# Patient Record
Sex: FEMALE | Race: BLACK OR AFRICAN AMERICAN | HISPANIC OR LATINO | Employment: OTHER | URBAN - METROPOLITAN AREA
[De-identification: names, ages, dates, MRNs, and addresses within clinical notes are randomized per-mention and may not be internally consistent; named-entity substitution may affect disease eponyms.]

---

## 2017-02-03 ENCOUNTER — HOSPITAL ENCOUNTER (OUTPATIENT)
Dept: RADIOLOGY | Facility: HOSPITAL | Age: 67
Discharge: HOME | End: 2017-02-03
Payer: COMMERCIAL

## 2017-02-03 DIAGNOSIS — Z12.39 ENCOUNTER FOR OTHER SCREENING FOR MALIGNANT NEOPLASM OF BREAST: ICD-10-CM

## 2017-02-03 PROCEDURE — G0202 SCR MAMMO BI INCL CAD: HCPCS

## 2017-02-24 ENCOUNTER — GENERIC CONVERSION - ENCOUNTER (OUTPATIENT)
Dept: OTHER | Facility: OTHER | Age: 67
End: 2017-02-24

## 2017-03-06 ENCOUNTER — ALLSCRIPTS OFFICE VISIT (OUTPATIENT)
Dept: OTHER | Facility: OTHER | Age: 67
End: 2017-03-06

## 2017-10-06 ENCOUNTER — LAB CONVERSION - ENCOUNTER (OUTPATIENT)
Dept: OTHER | Facility: OTHER | Age: 67
End: 2017-10-06

## 2017-10-06 LAB
CHOLEST SERPL-MCNC: 196 MG/DL
CHOLEST/HDLC SERPL: 3.2 (CALC)
HDLC SERPL-MCNC: 62 MG/DL
LDL CHOLESTEROL (HISTORICAL): 117 MG/DL (CALC)
NON-HDL-CHOL (CHOL-HDL) (HISTORICAL): 134 MG/DL (CALC)
TRIGL SERPL-MCNC: 76 MG/DL

## 2017-10-27 ENCOUNTER — ALLSCRIPTS OFFICE VISIT (OUTPATIENT)
Dept: OTHER | Facility: OTHER | Age: 67
End: 2017-10-27

## 2017-10-28 NOTE — PROGRESS NOTES
Assessment  1  Benign essential hypertension (401 1) (I10)   2  Rash (782 1) (R21)   3  Vitamin D deficiency (268 9) (E55 9)    Plan  Benign essential hypertension    · Follow-up visit in 6 months Evaluation and Treatment  Follow-up  Status: Complete  Done: [address] Maintenance    · Drink plenty of fluids ; Status:Complete;   Done: 27Oct2017   · Eat a low fat and low cholesterol diet ; Status:Complete;   Done: 27Oct2017   · The plan of care for falls is detailed in the plan and/or discussion section of today's note ;  Status:Complete;   Done: 27Oct2017   · There are many exercise options for seniors ; Status:Complete;   Done: 27Oct2017   · There ways to avoid falling ; Status:Complete;   Done: 27Oct2017   · These are things you can do to prevent falls in and around the home ; Status:Complete;   Done:  27Oct2017   · We recommend that you create an advance directive ; Status:Complete;   Done: 27Oct2017   · We recommend that you follow these steps to lower your risk of osteoporosis  ; Status:Complete;    Done: 98MFA7619  Need for influenza vaccination    · Fluzone High-Dose 0 5 ML Intramuscular Suspension Prefilled Syringe  Rash    · 2 - Joseph SALMON, Naomi Goodman  (Dermatology) Co-Management  *  Status: Active  Requested for:  37DVW7936  Care Summary provided  : Yes  Screening for genitourinary condition    · *VB - Urinary Incontinence Screen (Dx Z13 89 Screen for UI); Status:Active; Requested  QGP:92LJH3683;   Screening for malignant neoplasm of breast, Screening for neurological condition    · *VB - Fall Risk Assessment  (Dx Z13 89 Screen for Neurologic Disorder); Status:Active; Requested  NSM:02FQO3585;   Vitamin D deficiency    · Vitamin D (Ergocalciferol) 55567 UNIT Oral Capsule; take one capsule twice weekly for 6  weeks    Discussion/Summary  Discussion Summary:   essential HTN - this has been controlled on present regimen of Norvasc 10mg daily  Encouraged continued weight loss  vitamin D deficiency - labs obtained by rheumatologist  Has underlying osteoporosis  Recommend starting loading dose of vitamin D2 [id number] twice weekly x 6 weeks then start vitamin D3 at least 2000units daily thereafter  rash - occurring in flexural areas  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  Advised continuing use of ketoconazole cream, keep areas dry as much as possible  Will also refer to Derm for further evaluation  R lumbar radiculopathy with OA s/p epidural injections - encouraged weight loss  Takes Tramadol for pain  Consider PTosteoporosis - fosamax restarted by Rheumatology  She had been d/c'ed off it due to dental disease previously  Monitor closely  Patient also noted to be on chronic prednisone  RA - continue on humira, plaquenil and prednisone per Rheumremote h/o sarcoidosis - monitor  Medication SE Review and Pt Understands Tx: Possible side effects of new medications were reviewed with the patient/guardian today  The treatment plan was reviewed with the patient/guardian  The patient/guardian understands and agrees with the treatment plan      Chief Complaint  Chief Complaint Chronic Condition [facility]: Patient is here today for follow up of chronic conditions described in HPI  History of Present Illness  HPI: 69yo female with HTN, RA, sarcoidosis, abnormal thyroid function test and hyperparathyroidism here for follow up care  She presents with her daughter  rash comes and goes  Located in flexural areas  It is pruritic  She placed ketoconazole cream, sometimes it goes away  R groin, unable to elevate leg, resolved with Tramadol  Has occasional low back pain that radiates to her R groin   had vitamin D level checked by Rheum, reports level was 16  She was advised to start vitamin D3 2000units daily  Hypertension (Follow-Up): The patient presents for follow-up of essential hypertension  The patient states she has been stable with her blood pressure control since the last visit  She has no significant interval events  Symptoms: improved lower extremity edema  Home monitoring: The patient is not checking blood pressure at home  Medications: the patient is adherent with her medication regimen  -- She denies medication side effects  Medication(s): a calcium channel blocker  Review of Systems  Complete-Female:   Constitutional: recent weight loss, but-- No fever, no chills, feels well, no tiredness, no recent weight gain or weight loss  ENT: as noted in HPI  Cardiovascular: No complaints of slow heart rate, no fast heart rate, no chest pain, no palpitations, no leg claudication, no lower extremity edema  Respiratory: No complaints of shortness of breath, no wheezing, no cough, no SOB on exertion, no orthopnea, no PND  Gastrointestinal: No complaints of abdominal pain, no constipation, no nausea or vomiting, no diarrhea, no bloody stools  Genitourinary: No complaints of dysuria, no incontinence, no pelvic pain, no dysmenorrhea, no vaginal discharge or bleeding  Integumentary: as noted in HPI  Neurological: as noted in HPI  Active Problems  1  Allergic rhinitis (477 9) (J30 9)   2  Benign essential hypertension (401 1) (I10)   3  Bilateral hearing loss (389 9) (H91 93)   4  Candidal intertrigo (112 3) (B37 2)   5  Encounter for screening colonoscopy (V76 51) (Z12 11)   6  Gastroesophageal reflux disease (530 81) (K21 9)   7  Hyperparathyroidism (252 00) (E21 3)   8  Lumbar radiculopathy (724 4) (M54 16)   9  Morbid obesity (278 01) (E66 01)   10  Need for influenza vaccination (V04 81) (Z23)   11  Osteoporosis (733 00) (M81 0)   12  Rheumatoid arthritis (714 0) (M06 9)   13  Sarcoidosis (135) (D86 9)   14  Screening for genitourinary condition (V81 6) (Z13 89)   15  Screening for malignant neoplasm of breast (V76 10) (Z12 31)   16  Screening for neurological condition (V80 09) (Z13 89)   17  Vitamin D deficiency (268 9) (E55 9)    Past Medical History  1   History of Abnormal thyroid function test (794 5) (R94 6)   2  History of Acute URI (465 9) (J06 9)   3  History of Edema of right lower extremity (782 3) (R60 0)   4  History of caries (V13 89) (Z87 898)   5  History of dental abscess (V12 79) (Z87 19)   6  History of influenza vaccination (V49 89) (Z92 29)   7  History of Known health problems: none   8  History of Need for influenza vaccination (V04 81) (Z23)  Active Problems And Past Medical History Reviewed: The active problems and past medical history were reviewed and updated today  Surgical History  1  History of  Section  Surgical History Reviewed: The surgical history was reviewed and updated today  Family History  Mother    1  Family history of Asthma   2  Denied: Family history of colitis   3  Denied: Family history of colonic polyps   4  Denied: Family history of Crohn's disease   5  Denied: Family history of liver disease   6  Denied: Family history of Malignant neoplasm of colon, unspecified part of colon   7  Family history of Stroke  Father    8  Denied: Family history of colitis   9  Denied: Family history of colonic polyps   10  Denied: Family history of Crohn's disease   6  Denied: Family history of liver disease   12  Denied: Family history of Malignant neoplasm of colon, unspecified part of colon  Maternal Aunt    15  Family history of Diabetes  Maternal Cousin    15  Family history of Breast cancer   15  Family history of Diabetes  Maternal Relatives    12  Family history of Breast cancer  Family History Reviewed: The family history was reviewed and updated today  Social History   ·    · Never a smoker   · No alcohol use   · Occupation   · Retired  Social History Reviewed: The social history was reviewed and is unchanged  Current Meds   1  Acetaminophen 325 MG Oral Tablet; Therapy: (Recorded:22Woi6418) to Recorded   2  Fosamax 70 MG Oral Tablet; Therapy: (Recorded:12Ltj1981) to Recorded   3   Humira Pen 40 MG/0 8ML KIT; INJECT 40 MG SUBCUTANEOUSLY EVERY OTHER WEEK AS   DIRECTED; Therapy: 15LFX9931 to Recorded   4  Hydroxychloroquine Sulfate 200 MG Oral Tablet; TAKE 2 TABLETS DAILY WITH FOOD; Therapy: 78SMC8232 to Recorded   5  Ketoconazole 2 % External Cream; APPLY SPARINGLY TO AFFECTED AREA(S) TWICE A DAY; Therapy: 70OJQ1881 to (Evaluate:51Nsa3969)  Requested for: 43Exf2609; Last Rx:82Nmv2598   Ordered   6  MoviPrep 100 GM Oral Solution Reconstituted; USE AS DIRECTED; Therapy: 41OPM1685 to (Last Rx:70Jfr3282)  Requested for: 83Zpt3714 Ordered   7  Norvasc 10 MG Oral Tablet; take 1 tablet by mouth once daily; Therapy: 87CIW6200 to (Evaluate:67Grp2748)  Requested for: 82Omc1893; Last Rx:66Ibu6166   Ordered   8  Omeprazole 20 MG Oral Capsule Delayed Release; take 1 capsule daily; Therapy: 50GVL7343 to (Ashley Melara)  Requested for: 87MLT6422; Last Rx:74Qfj2353   Ordered   9  PredniSONE 1 MG Oral Tablet; Take 2 tablets in pm;   Therapy: 06MCU7648 to Recorded   10  TraMADol HCl - 50 MG Oral Tablet; TAKE 1 TABLET 3 times a day; Therapy: 49CXP7774 to (Evaluate:83Rfe3950) Recorded  Medication List Reviewed: The medication list was reviewed and updated today  Allergies  1  Unasyn  2  Shellfish    Vitals  Vital Signs    Recorded: 23ZSM9067 10:24AM   Temperature 97 2 F   Heart Rate 82   Respiration 16   Systolic 090   Diastolic 78   Height 5 ft 3 in   Weight 252 lb    BMI Calculated 44 64   BSA Calculated 2 13   O2 Saturation 99     Physical Exam    Constitutional   General appearance: No acute distress, well appearing and well nourished  morbidly obese  Head and Face   Head and face: Normal  -- wears glasses  Eyes   Conjunctiva and lids: No swelling, erythema or discharge  Ears, Nose, Mouth, and Throat   External inspection of ears and nose: Normal     Otoscopic examination: Tympanic membranes translucent with normal light reflex  Canals patent without erythema      Hearing: Normal     Nasal mucosa, septum, and turbinates: Normal without edema or erythema  Lips, teeth, and gums: Normal, good dentition  Oropharynx: Normal with no erythema, edema, exudate or lesions  Neck   Neck: Supple, symmetric, trachea midline, no masses  Thyroid: Normal, no thyromegaly  Pulmonary   Respiratory effort: No increased work of breathing or signs of respiratory distress  Auscultation of lungs: Clear to auscultation  Cardiovascular   Auscultation of heart: Normal rate and rhythm, normal S1 and S2, no murmurs  Carotid pulses: 2+ bilaterally  Pedal pulses: 2+ bilaterally  Examination of extremities for edema and/or varicosities: Normal     Chest deferred  Abdomen   Abdomen: Non-tender, no masses  The abdomen was obese  Bowel sounds were normal  The abdomen was soft and nontender  The abdomen was normal to percussion  Lymphatic   Palpation of lymph nodes in neck: No lymphadenopathy  Musculoskeletal ambulates with a cane  Skin   Examination of the skin for lesions: Abnormal  -- (mild erythema around her neck, bilateral antecubital fossa, under breasts b/l with minimal exfoliation)   Neurologic Grossly intact  Cortical function: Normal mental status  There is no cognitive impairment  The patient achieved a score of 30 / 30 on the MMSE  Level of consciousness: normal    Psychiatric   Orientation to person, place, and time: Normal     Mood and affect: Normal        Results/Data  (Q) LIPID PANEL WITH REFLEX TO DIRECT LDL 57OIS6661 10:42AM Marylen Crofts   REPORT COMMENT:  FASTING:YES  PATIENT UNABLE TO VOID; ADVISED TO RETURN FOR COLLECTION  Test Name Result Flag Reference   CHOLESTEROL, TOTAL 196 mg/dL  <200   HDL CHOLESTEROL 62 mg/dL  >24   TRIGLICERIDES 76 mg/dL  <717   LDL-CHOLESTEROL 117 mg/dL (calc) H    Reference range: <100     Desirable range <100 mg/dL for patients with CHD or  diabetes and <70 mg/dL for diabetic patients with  known heart disease       LDL-C is now calculated using the Anibal-Cabral   calculation, which is a validated novel method providing   better accuracy than the Friedewald equation in the   estimation of LDL-C  Kita Olmedo  Ivett Changon  77;618(45): 9911-0841   (http://[employer]/faq/FMJ419)   CHOL/HDLC RATIO 3 2 (calc)  <5 0   NON HDL CHOLESTEROL 134 mg/dL (calc) H <130   For patients with diabetes plus 1 major ASCVD risk   factor, treating to a non-HDL-C goal of <100 mg/dL   (LDL-C of <70 mg/dL) is considered a therapeutic   option       Future Appointments    Date/Time Provider Specialty Site   04/30/2018 10:45 AM Juan Aguilera DO Internal Medicine [facility] INTERNAL MED     Signatures   Electronically signed by : Diann Guzman DO; Oct 27 2017 12:47PM EST                       (Author)

## 2018-01-13 VITALS
WEIGHT: 252 LBS | OXYGEN SATURATION: 99 % | DIASTOLIC BLOOD PRESSURE: 78 MMHG | SYSTOLIC BLOOD PRESSURE: 128 MMHG | BODY MASS INDEX: 44.65 KG/M2 | HEART RATE: 82 BPM | RESPIRATION RATE: 16 BRPM | TEMPERATURE: 97.2 F | HEIGHT: 63 IN

## 2018-01-14 VITALS
RESPIRATION RATE: 16 BRPM | HEIGHT: 63 IN | BODY MASS INDEX: 47.33 KG/M2 | WEIGHT: 267.13 LBS | SYSTOLIC BLOOD PRESSURE: 130 MMHG | HEART RATE: 85 BPM | TEMPERATURE: 97 F | OXYGEN SATURATION: 98 % | DIASTOLIC BLOOD PRESSURE: 80 MMHG

## 2018-01-14 NOTE — MISCELLANEOUS
Assessment    1  Dental abscess (522 5) (K04 7)   2  Dental caries (521 00) (K02 9)    Discussion/Summary  Discussion Summary:     1  dental caries with dental abscess s/p extraction of teeth 2, 18, 19, 30, 31 and I&D - expressed importance of completing course of Clindamycin, to prevent resistance and resolution of infection  She is to follow up with OMFS on Monday  Continue to hold off on fosamax until ok by OMFS  Resume Humira in 1-2 weeks  Counseling Documentation With Imm: The patient, patient's family was counseled regarding instructions for management, risks and benefits of treatment options, importance of compliance with treatment  History of Present Illness  TCM Communication [facility]: The patient is being contacted for follow-up after hospitalization and [place]  She was hospitalized at [facility]  The date of admission: 8/23/2016, date of discharge: 8/29/2016  Diagnosis: Fever and Chills  She was discharged to home  She scheduled a follow up appointment  Symptoms: fatigue  The patient is currently experiencing symptoms  Counseling was provided to the patient  Communication performed and completed by Charles Guadarrama   HPI: 65yo female with h/o HTN, RA, sarcoidosis, abnormal thyroid function test and hyperparathyroidism here transition of care  She is accompanied by her daughter, Travis Ling  She was hospitalized at [facility] 8/23-8/29/16 with f/c and left sided mouth pain  She was found to have multiple dental caries and left facial abscess  She had multiple teeth extraction by OMFS  She had BC 1/2 return positive but was thought to be a contaminant by ID  She had a rash with Unasyn and subsequently discharged on Clindamycin  Patient admits that she has not completed her course of abx, has five pills left  She was told to withhold fosamax for 2-4 weeks  She has an upcoming follow up with OMFS on Monday  She is on a soft diet  Denies jaw pain, nausea, diarrhea, fever   She took one dose of oxycodone but did not like how it made her feel and has not taken it since  Review of Systems  Complete-Female:   Constitutional: No fever, no chills, feels well, no tiredness, no recent weight gain or weight loss  ENT: as noted in HPI  Cardiovascular: No complaints of slow heart rate, no fast heart rate, no chest pain, no palpitations, no leg claudication, no lower extremity edema  Respiratory: No complaints of shortness of breath, no wheezing, no cough, no SOB on exertion, no orthopnea, no PND  Neurological: as noted in HPI  Active Problems    1  Allergic rhinitis (477 9) (J30 9)   2  Benign essential hypertension (401 1) (I10)   3  Bilateral hearing loss (389 9) (H91 93)   4  Candidal intertrigo (112 3) (B37 2)   5  Cough (786 2) (R05)   6  Edema of right lower extremity (782 3) (R60 0)   7  Encounter for screening colonoscopy (V76 51) (Z12 11)   8  Gastroesophageal reflux disease (530 81) (K21 9)   9  Hyperparathyroidism (252 00) (E21 3)   10  Immunization, pneumococcus and influenza (V06 6) (Z23)   11  Lumbar radiculopathy (724 4) (M54 16)   12  Morbid obesity (278 01) (E66 01)   13  Need for influenza vaccination (V04 81) (Z23)   14  Need for pneumococcal vaccination (V03 82) (Z23)   15  Rheumatoid arthritis (714 0) (M06 9)   16  Sarcoidosis (135) (D86 9)   17  Screening for depression (V79 0) (Z13 89)   18  Screening for genitourinary condition (V81 6) (Z13 89)   19  Screening for malignant neoplasm of breast (V76 10) (Z12 39)   20  Screening for neurological condition (V80 09) (Z13 89)   21  Vitamin D deficiency (268 9) (E55 9)    Past Medical History    1  History of Abnormal thyroid function test (794 5) (R94 6)   2  History of Acute URI (465 9) (J06 9)   3  History of Known health problems: none    Surgical History    1  History of  Section  Surgical History Reviewed: The surgical history was reviewed and updated today  Family History  Mother    1   Family history of Asthma   2  Denied: Family history of colitis   3  Denied: Family history of colonic polyps   4  Denied: Family history of Crohn's disease   5  Denied: Family history of liver disease   6  Denied: Family history of Malignant neoplasm of colon, unspecified part of colon   7  Family history of Stroke  Father    8  Denied: Family history of colitis   9  Denied: Family history of colonic polyps   10  Denied: Family history of Crohn's disease   6  Denied: Family history of liver disease   12  Denied: Family history of Malignant neoplasm of colon, unspecified part of colon  Maternal Aunt    15  Family history of Diabetes  Maternal Cousin    15  Family history of Breast cancer   15  Family history of Diabetes  Maternal Relatives    12  Family history of Breast cancer  Family History Reviewed: The family history was reviewed and updated today  Social History    ·    · Never a smoker   · No alcohol use   · Occupation   · Retired  Social History Reviewed: The social history was reviewed and updated today  Current Meds   1  Acetaminophen 325 MG Oral Tablet; Therapy: (Recorded:53Pca0887) to Recorded   2  Clindamycin HCl - 300 MG Oral Capsule; Therapy: (Recorded:44Nbp7576) to Recorded   3  Humira Pen 40 MG/0 8ML KIT; INJECT 40 MG SUBCUTANEOUSLY EVERY OTHER   WEEK AS DIRECTED; Therapy: 53ZLQ1325 to Recorded   4  Hydroxychloroquine Sulfate 200 MG Oral Tablet; TAKE 2 TABLETS DAILY WITH FOOD; Therapy: 58SME8572 to Recorded   5  Ketoconazole 2 % External Cream; APPLY SPARINGLY TO AFFECTED AREA(S) TWICE   DAILY; Therapy: 32QTI8654 to (Last Rx:60Hrb1381)  Requested for: 52QNV1739 Ordered   6  MoviPrep 100 GM Oral Solution Reconstituted; USE AS DIRECTED; Therapy: 49MAS9491 to (Last Rx:10Uvj9473)  Requested for: 75Dxv7347 Ordered   7  Norvasc 10 MG Oral Tablet; TAKE 1 TABLET DAILY; Therapy: 91ZPR0882 to (Evaluate:40Zjv8996)  Requested for: 54PVM8254; Last   AP:23TBS4068 Ordered   8  PredniSONE 1 MG Oral Tablet; Take 2 tablets in pm;   Therapy: 57DJL6299 to Recorded   9  PredniSONE 5 MG Oral Tablet; TAKE 1 TABLET DAILY; Therapy: 90GJS4439 to (21 [phone]) Recorded   10  TraMADol HCl - 50 MG Oral Tablet; TAKE 1 TABLET 3 times a day; Therapy: 56JEJ3658 to (Evaluate:16Ivx3005) Recorded  Medication List Reviewed: The medication list was reviewed and updated today  Allergies    1  clindamycin    2  Shellfish    Physical Exam    Constitutional   General appearance: No acute distress, well appearing and well nourished  obese  Ears, Nose, Mouth, and Throat   External inspection of ears and nose: Normal     Otoscopic examination: Tympanic membranes translucent with normal light reflex  Canals patent without erythema  Nasal mucosa, septum, and turbinates: Normal without edema or erythema  Oropharynx: Abnormal   (sutures on lower madible)   Pulmonary   Respiratory effort: No increased work of breathing or signs of respiratory distress  Auscultation of lungs: Clear to auscultation  Cardiovascular   Auscultation of heart: Normal rate and rhythm, normal S1 and S2, without murmurs  Lymphatic   Palpation of lymph nodes in neck: No lymphadenopathy           Future Appointments    Date/Time Provider Specialty Site   10/12/2016 09:45 AM Alexander Meza DO Internal Medicine [facility] INTERNAL MED     Signatures   Electronically signed by : Charles Guadarrama OM; Aug 30 2016  3:52PM EST                       (Author)    Electronically signed by : Char Dickson DO; Sep  9 2016  2:49PM EST                       (Author)

## 2018-01-16 NOTE — PROGRESS NOTES
Assessment    1  Encounter for preventive health examination (V70 0) (Z00 00)    Plan  Benign essential hypertension    · (Q) LIPID PANEL WITH DIRECT LDL; Status:Active; Requested for:01Apr2016;    · (Q) TSH, 3RD GENERATION W/REFLEX TO FT4; Status:Active; Requested  for:01Apr2016;    · (Q) URINALYSIS, SCREEN; Status:Active; Requested for:01Apr2016;   Encounter for screening colonoscopy    · *2 - [facility] Physician Referral  Consult   Status: Active  Requested for: 01Apr2016  Care Summary provided  : Yes  Health Maintenance    · Drink plenty of fluids ; Status:Complete;   Done: 01Apr2016   · Eat a low fat and low cholesterol diet ; Status:Complete;   Done: 01Apr2016   · The plan of care for falls is detailed in the plan and/or discussion section of today's note ;  Status:Complete;   Done: 01Apr2016   · There are many exercise options for seniors ; Status:Complete;   Done: 01Apr2016   · These are things you can do to prevent falls in and around the home ; Status:Complete;    Done: 01Apr2016   · We encourage you to begin to make lifestyle changes to help control your blood  pressure  These may include losing weight, increasing your activity level, limiting salt in  your diet, decreasing alcohol intake, and eating a diet low in fat and rich in fruits  and vegetables ; Status:Complete;   Done: 01Apr2016   · We recommend that you create an advance directive ; Status:Complete;   Done:  01Apr2016   · We recommend that you follow these steps to lower your risk of osteoporosis  ;  Status:Complete;   Done: [id number]  Health Maintenance, Encounter for screening colonoscopy    · COLONOSCOPY; Status:Active; Requested for:01Apr2016;   Screening for genitourinary condition    · *VB-Urinary Incontinence Screen (Dx V81 6 Screen for UI); Status:Active;  Requested  for:01Apr2016;   Screening for neurological condition    · *VB - Fall Risk Assessment  (Dx V80 09 Screen for Neurologic Disorder); Status:Active; Requested for:01Apr2016;     Discussion/Summary    Depression screen - negative     screen - negative    Fall screen - negative  Impression: Initial Annual Wellness Visit, with preventive exam as well as age and risk appropriate counseling completed  Cardiovascular screening and counseling: the risks and benefits of screening were discussed and screening is current  Diabetes screening and counseling: the risks and benefits of screening were discussed and screening is current  Colorectal cancer screening and counseling: the risks and benefits of screening were discussed, due for a colonoscopy (low risk) and last colonoscopy in 2000  Breast cancer screening and counseling: the risks and benefits of screening were discussed and screening is current  Cervical cancer screening and counseling: the risks and benefits of screening were discussed and the patient declines screening  Osteoporosis screening and counseling: the risks and benefits of screening were discussed, screening is current, counseling was given on obtaining adequate amounts of calcium and vitamin D on a daily basis, counseling was given on the importance of regular weightbearing exercise and done by Rheum Dr Sonia Spain    Abdominal aortic aneurysm screening and counseling: screening not indicated  Glaucoma screening and counseling: the risks and benefits of screening were discussed, screening is current and ophthalmologist referral    HIV screening and counseling: screening not indicated   Immunizations: influenza vaccine is up to date this year, the lifetime pneumococcal vaccine has been completed, hepatitis B vaccination series is not indicated at this time due to the patient's low risk of peyton the disease, unable to have shingles to due medications, the risks and benefits of the Td vaccine were discussed with the patient, the patient declines the Td vaccine, the risks and benefits of the Tdap vaccine were discussed with the patient and the patient declines the Tdap vaccine  Advance Directive Planning: not complete, she was encouraged to follow-up with me to discuss her questions and/or decisions  Advice and education were given regarding fall risk reduction, increasing physical activity and nutrition (non-diabetic)  She was referred to none today  Medical Equipment/Suppliers: none today  Patient Discussion: plan discussed with the patient, follow-up visit needed in 6 months, 40 minute visit, greater than half of the time was spent on counseling  History of Present Illness  HPI: 68yo female with h/o HTN, RA, sarcoidosis, abnormal thyroid function test and hyperparathyroidism here medicare wellness visit   Welcome to [employer] and Wellness Visits: The patient is being seen for the initial annual wellness visit  Medicare Screening and Risk Factors   Hospitalizations: no previous hospitalizations, she has been hospitalized 0 times and No hospitalizations over past 12 months  Once per lifetime medicare screening tests: not eligible  Medicare Screening Tests Risk Questions   Abdominal aortic aneurysm risk assessment: none indicated  Osteoporosis risk assessment:  and female gender  HIV risk assessment: none indicated  Drug and Alcohol Use: The patient has never smoked cigarettes  The patient reports never drinking alcohol  Diet and Physical Activity: Current diet includes low fat food choices and low salt food choices  She is sedentary  (regular diet)   Mood Disorder and Cognitive Impairment Screening: Geriatric Depression Scale   Yes, the patient is satisfied with Her life  No, the patient has not dropped any activities or interests  No, the patient does not feel that their life is empty  No, the patient does not often get bored  Yes, the patient is hopeful about the future  No, the patient is not bothered by thoughts in their head     Yes, the patient is in good spirits most of the time    Yes, the patient is afraid something bad is going to happen to them  Point = 1  Yes, the patient feels happy most of the time  No, the patient does not often feel helpless  No, the patient mortensen not often get restless and fidgety  Yes, the patient prefers to stay home rather then try new things  Point = 1  Yes, the patient often worries about the future  Point = 1  No, the patient does not feel that they have more problems with their memory than most    Yes, the patient thinks its wonderful to be alive now  No, the patient does not feel downhearted or blue  No, the patient does not feel worthless the way they are currently  Yes, the patient finds that life is very exciting  No, the patient does not worry a lot about the past    No, it is not hard for the patient to get started on new projects  Yes, the patient feels full of energy  No, the patient does not feel their situation is hopeless  No, the patient does not feel that most people are better off then they are  No, the patient does not frequently get upset about the little things  No, the patient does not frequently feel like crying  No, the patient does not have any problems concentrating  Yes, the patient enjoys getting up in the morning  No, the patient enjoys social gatherings  Yes, the patient finds it easy to make decisions  Yes, the patient feels their mind is as clear as it used to be  Score 3  Normal 0 - 9, Mild Depression 10 - 19, Severe Depression 20 - 30   Functional Ability/Level of Safety: Hearing is slightly decreased  The patient is currently unable to drive, but able to do activities of daily living without limitations, able to do instrumental activities of daily living without limitations and able to participate in social activities without limitations   Activities of daily living details: transportation help needed, but does not need help using the phone, does not need help shopping, no meal preparation help needed, does not need help doing housework, does not need help doing laundry, does not need help managing medications and does not need help managing money  Fall risk factors: The patient fell 0 times in the past 12 months  Home safety risk factors:  no grab bars in the bathroom and Lives with her daughter and two granddaughters in a multistory home, but no loose rugs, no poor household lighting, no household clutter and handrails on the stairs  Advance Directives: Advance directives: no living will, no durable power of  for health care directives and no advance directives  Co-Managers and Medical Equipment/Suppliers: See Patient Care Team   Preventive Quality Program 65 and Older: Falls Risk: The patient fell 0 times in the past 12 months  The patient currently has no urinary incontinence symptoms  Patient Care Team    Care Team Member Role Specialty Office Number   Haydee Weir MD  Rheumatology [phone]   Lyndsey Li MD  Ophthalmology [phone]     Review of Systems    Constitutional: negative    The patient presents with complaints of eyesight problems (cataracts)  ENT: nasal discharge  Cardiovascular: negative  Respiratory: negative  Gastrointestinal: heartburn, but no abdominal pain, no nausea and no constipation  Genitourinary: negative  Musculoskeletal: diffuse joint pain and joint stiffness  Neurological: headache, but no dizziness  Active Problems    1  Allergic rhinitis (477 9) (J30 9)   2  Benign essential hypertension (401 1) (I10)   3  Bilateral hearing loss (389 9) (H91 93)   4  Candidal intertrigo (112 3) (B37 2)   5  Cough (786 2) (R05)   6  Edema of right lower extremity (782 3) (R60 0)   7  Encounter for screening colonoscopy (V76 51) (Z12 11)   8  GERD (gastroesophageal reflux disease) (530 81) (K21 9)   9  Hyperparathyroidism (252 00) (E21 3)   10  Immunization, pneumococcus and influenza (V06 6) (Z23)   11   Lumbar radiculopathy (724 4) (M54 16)   12  Need for influenza vaccination (V04 81) (Z23)   13  Need for pneumococcal vaccination (V03 82) (Z23)   14  Rheumatoid arthritis (714 0) (M06 9)   15  Sarcoidosis (135) (D86 9)   16  Screening for malignant neoplasm of breast (V76 10) (Z12 39)   17  Vitamin D deficiency (268 9) (E55 9)    Past Medical History    · History of Abnormal thyroid function test (794 5) (R94 6)   · History of Acute URI (465 9) (J06 9)   · History of Known health problems: none    The active problems and past medical history were reviewed and updated today  Surgical History    · History of  Section    The surgical history was reviewed and updated today  Family History    · Family history of Asthma   · Family history of Stroke    · No pertinent family history    · Family history of Diabetes    · Family history of Breast cancer   · Family history of Diabetes    · Family history of Breast cancer    The family history was reviewed and updated today  Social History    ·    · Never a smoker   · No alcohol use   · Occupation   · worked for [employer]   · Retired  The social history was reviewed and is unchanged  Current Meds   1  Humira Pen 40 MG/0 8ML KIT; INJECT 40 MG SUBCUTANEOUSLY EVERY OTHER   WEEK AS DIRECTED; Therapy: 57KZB1889 to Recorded   2  Hydroxychloroquine Sulfate 200 MG Oral Tablet; TAKE 2 TABLETS DAILY WITH FOOD; Therapy: 60BTL5843 to Recorded   3  Ketoconazole 2 % External Cream; APPLY SPARINGLY TO AFFECTED AREA(S) TWICE   DAILY; Therapy: 78VHX2336 to (Last Rx:[id number])  Requested for: 96BUK6790 Ordered   4  Norvasc 10 MG Oral Tablet; TAKE 1 TABLET DAILY; Therapy: 70EFE7766 to (July Dec)  Requested for: 52GGQ5645; Last   Rx:[id number] Ordered   5  Omeprazole 20 MG Oral Capsule Delayed Release; TAKE 1 CAPSULE DAILY; Therapy: 25YAS1553 to (Evaluate:2016)  Requested for: 53Asa6981; Last   Rx:[id number] Ordered   6   PredniSONE 1 MG Oral Tablet; Take 2 tablets in pm;   Therapy: 02HUJ4878 to Recorded   7  PredniSONE 5 MG Oral Tablet; TAKE 1 TABLET DAILY; Therapy: 97BOI4287 to (4951 35 06 90) Recorded   8  TraMADol HCl - 50 MG Oral Tablet; TAKE 1 TABLET 3 times a day; Therapy: 56RQM8274 to (Evaluate:21Iwi7125) Recorded    The medication list was reviewed and updated today  Allergies    1  clindamycin    Immunizations   1 2    Influenza  37BNM0006 07JSG2991    Pneumococcal  06PXT3057 [id number]     Vitals  Signs [Data Includes: Current Encounter]    Temperature: 98 9 F  Heart Rate: 100  Respiration: 17  Systolic: 131  Diastolic: 86  Height: 5 ft 3 in  Weight: 264 lb 2 08 oz  BMI Calculated: 46 79  BSA Calculated: 2 18  O2 Saturation: 98    Physical Exam    Constitutional   General appearance: No acute distress, well appearing and well nourished  morbidly obese  Head and Face   Head and face: Normal   wears glasses  Eyes   Conjunctiva and lids: No swelling, erythema or discharge  Ears, Nose, Mouth, and Throat   External inspection of ears and nose: Normal     Otoscopic examination: Tympanic membranes translucent with normal light reflex  Canals patent without erythema  Hearing: Normal     Nasal mucosa, septum, and turbinates: Normal without edema or erythema  Lips, teeth, and gums: Normal, good dentition  Oropharynx: Normal with no erythema, edema, exudate or lesions  Neck   Neck: Supple, symmetric, trachea midline, no masses  Thyroid: Normal, no thyromegaly  Pulmonary   Respiratory effort: No increased work of breathing or signs of respiratory distress  Auscultation of lungs: Clear to auscultation  Cardiovascular   Auscultation of heart: Normal rate and rhythm, normal S1 and S2, no murmurs  Carotid pulses: 2+ bilaterally  Pedal pulses: 2+ bilaterally  Examination of extremities for edema and/or varicosities: Abnormal   right pretibial 1+ pitting edema     Chest   Breasts: Normal, no dimpling or skin changes appreciated  Palpation of breasts and axillae: Normal, no masses palpated  Chest: Normal     Abdomen   Abdomen: Non-tender, no masses  The abdomen was obese  Bowel sounds were normal  The abdomen was soft and nontender  The abdomen was normal to percussion  Lymphatic   Palpation of lymph nodes in neck: No lymphadenopathy  Musculoskeletal ambulates with a cane  Neurologic No focal deficit  Cortical function: Normal mental status  There is no cognitive impairment  The patient achieved a score of 28 / 30 on the MMSE  Level of consciousness: normal    Psychiatric   Orientation to person, place, and time: Normal     Mood and affect: Normal        Procedure    Procedure: Visual Acuity Test    Results: 20/25 in both eyes without corrective device, 20/40 in the right eye without corrective device, 20/50 in the left eye without corrective device, 20/40 in both eyes with corrective device, 20/30 in the right eye with corrective device, 20/25 in the left eye with corrective device The patient was referred to Opthomology  Signatures   Electronically signed by : Arlen Obregon DO;  Apr 1 2016 10:38AM EST                       (Author)

## 2018-01-18 NOTE — PROGRESS NOTES
Assessment    1  Encounter for preventive health examination (V70 0) (Z00 00)    Plan  Benign essential hypertension    · Follow-up visit in 6 months Evaluation and Treatment  Follow-up  Status: Complete   Done: [address] Maintenance    · Drink plenty of fluids ; Status:Complete;   Done: 27Oct2017   · Eat a low fat and low cholesterol diet ; Status:Complete;   Done: 27Oct2017   · The plan of care for falls is detailed in the plan and/or discussion section of today's note ;  Status:Complete;   Done: 27Oct2017   · There are many exercise options for seniors ; Status:Complete;   Done: 27Oct2017   · There ways to avoid falling ; Status:Complete;   Done: 27Oct2017   · These are things you can do to prevent falls in and around the home ; Status:Complete;    Done: 27Oct2017   · We recommend that you create an advance directive ; Status:Complete;   Done:  27Oct2017   · We recommend that you follow these steps to lower your risk of osteoporosis  ;  Status:Complete;   Done: [id number]  Need for influenza vaccination    · Fluzone High-Dose 0 5 ML Intramuscular Suspension Prefilled Syringe  Rash    · 2 - Joseph SALMON, Lenette Press  (Dermatology) Co-Management  *  Status: Active  Requested  for: 10VHL5948  Care Summary provided  : Yes  Screening for genitourinary condition    · *VB - Urinary Incontinence Screen (Dx Z13 89 Screen for UI); Status:Active; Requested  SIZ:[id number];   Screening for malignant neoplasm of breast, Screening for neurological condition    · *VB - Fall Risk Assessment  (Dx Z13 89 Screen for Neurologic Disorder); Status:Active; Requested KHUSHBOO:[id number];   Vitamin D deficiency    · Vitamin D (Ergocalciferol) 84860 UNIT Oral Capsule; take one capsule twice  weekly for 6 weeks    Discussion/Summary    Depression screen - negative    Fall screen - negative     screen - negative  Impression: Subsequent Annual Wellness Visit, with preventive exam as well as age and risk appropriate counseling completed  Cardiovascular screening and counseling: the risks and benefits of screening were discussed and screening is current  Diabetes screening and counseling: the risks and benefits of screening were discussed and screening is current  Colorectal cancer screening and counseling: the risks and benefits of screening were discussed and due for a colonoscopy (low risk)  Breast cancer screening and counseling: the risks and benefits of screening were discussed and screening is current  Cervical cancer screening and counseling: the risks and benefits of screening were discussed, the patient declines screening and due to advanced age  Osteoporosis screening and counseling: screening is current and followed by Dr Hunter Regalado    Abdominal aortic aneurysm screening and counseling: screening not indicated  Glaucoma screening and counseling: the risks and benefits of screening were discussed and ophthalmologist referral    HIV screening and counseling: screening not indicated  Immunizations: the risks and benefits of influenza vaccination were discussed with the patient, influenza vaccine is due today, the lifetime pneumococcal vaccine has been completed, hepatitis B vaccination series is not indicated at this time due to the patient's low risk of peyton the disease, unable to have shingles to due medications, the risks and benefits of the Td vaccine were discussed with the patient, the patient declines the Td vaccine, the risks and benefits of the Tdap vaccine were discussed with the patient and the patient declines the Tdap vaccine  Advance Directive Planning: not complete, she was encouraged to follow-up with me to discuss her questions and/or decisions  Advice and education were given regarding fall risk reduction, increasing physical activity and nutrition (non-diabetic)  She was referred to none today  Medical Equipment/Suppliers: none today   Patient Discussion: plan discussed with the patient, plan discussed with the patient's family, follow-up visit needed in 6 months, 18 minute visit, greater than half of the time was spent on counseling  History of Present Illness  69yo female with HTN, RA, sarcoidosis, abnormal thyroid function test and hyperparathyroidism here for transition of care  She is accompanied by her daughter, Magdy York   The patient is being seen for the subsequent annual wellness visit  Medicare Screening and Risk Factors   Hospitalizations: she has been previously hospitalizied, she has been hospitalized 1 times and hospitalized 8/2016 due to dental caries with abscess  Once per lifetime medicare screening tests: not eligible  Medicare Screening Tests Risk Questions   Abdominal aortic aneurysm risk assessment: none indicated  Osteoporosis risk assessment:  and female gender  HIV risk assessment: none indicated  Drug and Alcohol Use: The patient has never smoked cigarettes  The patient reports never drinking alcohol  Diet and Physical Activity: She is sedentary  (regular diet)   Mood Disorder and Cognitive Impairment Screening: She denies feeling down, depressed, or hopeless over the past two weeks  She denies feeling little interest or pleasure in doing things over the past two weeks  Functional Ability/Level of Safety: Hearing is slightly decreased  The patient is currently unable to drive, but able to do activities of daily living without limitations, able to do instrumental activities of daily living without limitations and able to participate in social activities without limitations  Fall risk factors: The patient fell 0 times in the past 12 months  Home safety risk factors:  no grab bars in the bathroom and Lives with her daughter an dtwo granddaugthers in a multistory home  Advance Directives: Advance directives: no living will, no durable power of  for health care directives and no advance directives     Co-Managers and Medical Equipment/Suppliers: See Patient Care Team     Last Medicare Wellness Visit Information was reviewed, patient interviewed and updates made to the record today  Falls Risk: The patient fell 0 times in the past 12 months  The patient currently has no urinary incontinence symptoms  Patient Care Team    Care Team Member Role Specialty Office Number   Broa Helms MD  Rheumatology [phone]   Pamela Llanes MD  Ophthalmology [phone]   Juan Bennett DO Attending Internal Medicine [phone]   Tanika Armando MD Specialist Gastroenterology Adult [phone]     Review of Systems    Constitutional: recent ~Ulb weight loss, but No fever, no chills, feels well, no tiredness, no recent weight gain or weight loss  ENT: nasal discharge  Cardiovascular: No complaints of slow heart rate, no fast heart rate, no chest pain, no palpitations, no leg claudication, no lower extremity edema  Respiratory: No complaints of shortness of breath, no wheezing, no cough, no SOB on exertion, no orthopnea, no PND  Gastrointestinal: No complaints of abdominal pain, no constipation, no nausea or vomiting, no diarrhea, no bloody stools  Genitourinary: No complaints of dysuria, no incontinence, no pelvic pain, no dysmenorrhea, no vaginal discharge or bleeding  Musculoskeletal: hip pain and lower back pain  Integumentary: a rash  Neurological: headache and dizziness  Active Problems    1  Allergic rhinitis (477 9) (J30 9)   2  Benign essential hypertension (401 1) (I10)   3  Bilateral hearing loss (389 9) (H91 93)   4  Candidal intertrigo (112 3) (B37 2)   5  Encounter for screening colonoscopy (V76 51) (Z12 11)   6  Gastroesophageal reflux disease (530 81) (K21 9)   7  Hyperparathyroidism (252 00) (E21 3)   8  Lumbar radiculopathy (724 4) (M54 16)   9  Morbid obesity (278 01) (E66 01)   10  Need for influenza vaccination (V04 81) (Z23)   11  Osteoporosis (733 00) (M81 0)   12  Rheumatoid arthritis (714 0) (M06 9)   13  Sarcoidosis (135) (D86 9)   14  Screening for genitourinary condition (V81 6) (Z13 89)   15  Screening for malignant neoplasm of breast (V76 10) (Z12 31)   16  Screening for neurological condition (V80 09) (Z13 89)   17  Vitamin D deficiency (268 9) (E55 9)    Past Medical History    1  History of Abnormal thyroid function test (794 5) (R94 6)   2  History of Acute URI (465 9) (J06 9)   3  History of Edema of right lower extremity (782 3) (R60 0)   4  History of caries (V13 89) (Z87 898)   5  History of dental abscess (V12 79) (Z87 19)   6  History of Known health problems: none   7  History of Need for influenza vaccination (V04 81) (Z23)    The active problems and past medical history were reviewed and updated today  Surgical History    1  History of  Section    The surgical history was reviewed and updated today  Family History  Mother    1  Family history of Asthma   2  Denied: Family history of colitis   3  Denied: Family history of colonic polyps   4  Denied: Family history of Crohn's disease   5  Denied: Family history of liver disease   6  Denied: Family history of Malignant neoplasm of colon, unspecified part of colon   7  Family history of Stroke  Father    8  Denied: Family history of colitis   9  Denied: Family history of colonic polyps   10  Denied: Family history of Crohn's disease   6  Denied: Family history of liver disease   12  Denied: Family history of Malignant neoplasm of colon, unspecified part of colon  Maternal Aunt    15  Family history of Diabetes  Maternal Cousin    15  Family history of Breast cancer   15  Family history of Diabetes  Maternal Relatives    12  Family history of Breast cancer    The family history was reviewed and updated today  Social History    ·    · Never a smoker   · No alcohol use   · Occupation   · Retired  The social history was reviewed and is unchanged  Current Meds   1  Acetaminophen 325 MG Oral Tablet;    Therapy: (Recorded:57Rrm1426) to Recorded   2  Fosamax 70 MG Oral Tablet; Therapy: (Recorded:27Oct2017) to Recorded   3  Humira Pen 40 MG/0 8ML KIT; INJECT 40 MG SUBCUTANEOUSLY EVERY OTHER   WEEK AS DIRECTED; Therapy: 14WNV7423 to Recorded   4  Hydroxychloroquine Sulfate 200 MG Oral Tablet; TAKE 2 TABLETS DAILY WITH FOOD; Therapy: 41ESP0011 to Recorded   5  Ketoconazole 2 % External Cream; APPLY SPARINGLY TO AFFECTED AREA(S) TWICE A   DAY; Therapy: 78TPM2769 to (Evaluate:10Fqq9400)  Requested for: 95Blf4120; Last   Rx:34Niv3589 Ordered   6  MoviPrep 100 GM Oral Solution Reconstituted; USE AS DIRECTED; Therapy: 00NXA0950 to (Last Rx:69Wkc1246)  Requested for: 74Hxw5864 Ordered   7  Norvasc 10 MG Oral Tablet; take 1 tablet by mouth once daily; Therapy: 99VJB1104 to (Evaluate:36Tjo2137)  Requested for: 09Wyk0376; Last   Rx:60Kqu4637 Ordered   8  Omeprazole 20 MG Oral Capsule Delayed Release; take 1 capsule daily; Therapy: 47XFW8573 to (Donal Fabry)  Requested for: 09IDK3303; Last   Rx:60Gae6916 Ordered   9  PredniSONE 1 MG Oral Tablet; Take 2 tablets in pm;   Therapy: 77PMN8841 to Recorded   10  TraMADol HCl - 50 MG Oral Tablet; TAKE 1 TABLET 3 times a day; Therapy: 02ZPA0508 to (Evaluate:26Lhq6671) Recorded    The medication list was reviewed and updated today  Allergies    1  Unasyn    2  Shellfish    Immunizations  Influenza --- Ryan Galvanlk: [id number] Rail: 17-Dec-2015; Rosa Bottom: 28-Oct-2016; Series4:  28-Oct-2016   PCV --- Series1: 04-Dec-2014   PPSV --- Series1: 17-Dec-2015     Vitals  Signs   Recorded: 30WZY4994 10:24AM   Temperature: 97 2 F  Heart Rate: 82  Respiration: 16  Systolic: 165  Diastolic: 78  Height: 5 ft 3 in  Weight: 252 lb   BMI Calculated: 44 64  BSA Calculated: 2 13  O2 Saturation: 99    Procedure    Procedure: Visual Acuity Test    Follow-up  n/a for subsequent AWV  The patient was referred to Opthomology        Future Appointments    Date/Time Provider Specialty Site 04/30/2018 10:45 AM Kate Best DO Internal Medicine [facility] INTERNAL MED     Signatures   Electronically signed by : Chantelle Bhagat DO; Oct 27 2017 12:18PM EST                       (Author)

## 2018-02-12 DIAGNOSIS — I10 BENIGN ESSENTIAL HYPERTENSION: Primary | ICD-10-CM

## 2018-02-13 RX ORDER — AMLODIPINE BESYLATE 10 MG/1
10 TABLET ORAL DAILY
Qty: 90 TABLET | Refills: 3 | Status: SHIPPED | OUTPATIENT
Start: 2018-02-13 | End: 2018-04-11 | Stop reason: SDUPTHER

## 2018-03-30 ENCOUNTER — TELEPHONE (OUTPATIENT)
Dept: INTERNAL MEDICINE CLINIC | Facility: CLINIC | Age: 68
End: 2018-03-30

## 2018-03-30 DIAGNOSIS — R05.9 COUGH: Primary | ICD-10-CM

## 2018-03-30 RX ORDER — PROMETHAZINE HYDROCHLORIDE AND CODEINE PHOSPHATE 6.25; 1 MG/5ML; MG/5ML
5 SYRUP ORAL EVERY 4 HOURS PRN
Qty: 120 ML | Refills: 0 | Status: SHIPPED | OUTPATIENT
Start: 2018-03-30 | End: 2018-11-15 | Stop reason: SDUPTHER

## 2018-03-30 NOTE — TELEPHONE ENCOUNTER
Patient states she has a bad cough and the only medication that helps her is a syrup with codeine  I don't see anything on her med list    She said you prescribed it for her back in 2016      Send to [facility] on [address]

## 2018-03-31 DIAGNOSIS — K21.9 GASTROESOPHAGEAL REFLUX DISEASE WITHOUT ESOPHAGITIS: Primary | ICD-10-CM

## 2018-04-01 RX ORDER — OMEPRAZOLE 20 MG/1
CAPSULE, DELAYED RELEASE ORAL
Qty: 30 CAPSULE | Refills: 3 | Status: SHIPPED | OUTPATIENT
Start: 2018-04-01 | End: 2018-04-03 | Stop reason: SDUPTHER

## 2018-04-03 DIAGNOSIS — K21.9 GASTROESOPHAGEAL REFLUX DISEASE WITHOUT ESOPHAGITIS: ICD-10-CM

## 2018-04-03 RX ORDER — OMEPRAZOLE 20 MG/1
20 CAPSULE, DELAYED RELEASE ORAL DAILY
Qty: 90 CAPSULE | Refills: 3 | Status: SHIPPED | OUTPATIENT
Start: 2018-04-03 | End: 2019-07-10 | Stop reason: SDUPTHER

## 2018-04-11 DIAGNOSIS — I10 BENIGN ESSENTIAL HYPERTENSION: ICD-10-CM

## 2018-04-11 RX ORDER — AMLODIPINE BESYLATE 10 MG/1
10 TABLET ORAL DAILY
Qty: 90 TABLET | Refills: 1 | Status: SHIPPED | OUTPATIENT
Start: 2018-04-11 | End: 2018-09-14 | Stop reason: SDUPTHER

## 2018-04-30 ENCOUNTER — OFFICE VISIT (OUTPATIENT)
Dept: INTERNAL MEDICINE CLINIC | Facility: CLINIC | Age: 68
End: 2018-04-30
Payer: COMMERCIAL

## 2018-04-30 VITALS
BODY MASS INDEX: 44.83 KG/M2 | DIASTOLIC BLOOD PRESSURE: 80 MMHG | SYSTOLIC BLOOD PRESSURE: 120 MMHG | HEIGHT: 64 IN | RESPIRATION RATE: 18 BRPM | OXYGEN SATURATION: 96 % | TEMPERATURE: 97.8 F | WEIGHT: 262.6 LBS | HEART RATE: 93 BPM

## 2018-04-30 DIAGNOSIS — E66.01 MORBID OBESITY (HCC): ICD-10-CM

## 2018-04-30 DIAGNOSIS — Z12.39 SCREENING FOR BREAST CANCER: ICD-10-CM

## 2018-04-30 DIAGNOSIS — I10 BENIGN ESSENTIAL HYPERTENSION: Primary | ICD-10-CM

## 2018-04-30 DIAGNOSIS — M54.16 LUMBAR RADICULOPATHY: ICD-10-CM

## 2018-04-30 DIAGNOSIS — R21 RASH: ICD-10-CM

## 2018-04-30 PROBLEM — R05.9 COUGH: Status: RESOLVED | Noted: 2018-03-30 | Resolved: 2018-04-30

## 2018-04-30 PROCEDURE — 3079F DIAST BP 80-89 MM HG: CPT | Performed by: INTERNAL MEDICINE

## 2018-04-30 PROCEDURE — 99214 OFFICE O/P EST MOD 30 MIN: CPT | Performed by: INTERNAL MEDICINE

## 2018-04-30 PROCEDURE — 3074F SYST BP LT 130 MM HG: CPT | Performed by: INTERNAL MEDICINE

## 2018-04-30 RX ORDER — ALENDRONATE SODIUM 70 MG/1
TABLET ORAL
COMMUNITY

## 2018-04-30 RX ORDER — DIPHENOXYLATE HYDROCHLORIDE AND ATROPINE SULFATE 2.5; .025 MG/1; MG/1
1 TABLET ORAL DAILY
COMMUNITY
End: 2022-01-26 | Stop reason: HOSPADM

## 2018-04-30 RX ORDER — MELATONIN
1000 DAILY
COMMUNITY

## 2018-04-30 RX ORDER — KETOCONAZOLE 20 MG/G
CREAM TOPICAL 2 TIMES DAILY
COMMUNITY
Start: 2015-06-29 | End: 2018-10-05 | Stop reason: SDUPTHER

## 2018-04-30 NOTE — ASSESSMENT & PLAN NOTE
BMI 45  Patient has been inactive due to RA  She is also on chronic prednisone  She is being referred to PT and hopefully will be more active after

## 2018-04-30 NOTE — PROGRESS NOTES
Assessment/Plan:    Benign essential hypertension  Stable on current regimen of amlodipine 10mg daily  Will continue    Lumbar radiculopathy  Discussed losing weight and being more active  She failed epidural inj x 3 and has been maintained on tramadol for breakthrough pain prn  She would benefit form PT, will refer  Morbid obesity (HCC)  BMI 45  Patient has been inactive due to RA  She is also on chronic prednisone  She is being referred to PT and hopefully will be more active after  Rash  Not responsive to antifungal cream   Will refer to Derm again  1  Benign essential hypertension  Lipid panel    TSH, 3rd generation with T4 reflex   2  Lumbar radiculopathy  Ambulatory referral to Physical Therapy   3  Rash  Ambulatory referral to Dermatology   4  Morbid obesity ([facility] [address] )     5  Screening for breast cancer  Mammo screening bilateral w cad       Subjective:      Patient ID: Bridgette Mckay is a 69 y o  female  67yo female with HTN, morbid obesity, osteoporosis, vitamin D def, RA and sarcoidosis here for follow up care  She is accompanied by her daughter  Hypertension   This is a chronic problem  The current episode started more than 1 year ago  The problem is controlled (she has not been monitoring her home bp)  Associated symptoms include headaches  Pertinent negatives include no chest pain or palpitations  Past treatments include calcium channel blockers  She gets LBP with radiation and knee pain  Had R knee xray that showed osteoarthritis  She received epidural inj x 3  She has been taking tramadol as needed  She sits most of the day and finds swelling RLE by the end  She elevates her L leg up but not the R  She continues to have a rash on her anterior chest, she was previously tried on ketoconazole cream but rash returns  It is mildly pruritic  She was referred to Derm at her last visit but has yet to schedule      The following portions of the patient's history were reviewed and updated as appropriate: allergies, current medications, past family history, past medical history, past social history, past surgical history and problem list     Current Outpatient Prescriptions:     acetaminophen (TYLENOL) 325 mg tablet, Take 2 tablets (650 mg total) by mouth every 4 (four) hours as needed for mild pain, headaches or fever  , Disp: 30 tablet, Rfl: 0    Adalimumab (HUMIRA PEN) 40 MG/0 8ML PNKT, Inject 40 mg under the skin every 14 (fourteen) days, Disp: , Rfl:     alendronate (FOSAMAX) 70 mg tablet, Take by mouth, Disp: , Rfl:     amLODIPine (NORVASC) 10 mg tablet, Take 1 tablet (10 mg total) by mouth daily, Disp: 90 tablet, Rfl: 1    cholecalciferol (VITAMIN D3) 1,000 units tablet, Take 1,000 Units by mouth daily, Disp: , Rfl:     hydroxychloroquine (PLAQUENIL) 200 mg tablet, Take 200 mg by mouth 2 (two) times a day , Disp: , Rfl:     ketoconazole (NIZORAL) 2 % cream, Apply topically 2 (two) times a day, Disp: , Rfl:     multivitamin (THERAGRAN) TABS, Take 1 tablet by mouth daily, Disp: , Rfl:     omeprazole (PriLOSEC) 20 mg delayed release capsule, Take 1 capsule (20 mg total) by mouth daily, Disp: 90 capsule, Rfl: 3    predniSONE 5 mg tablet, Take 5 mg by mouth daily  1 mg prednisone 2 x/day, Disp: , Rfl:     promethazine-codeine (PHENERGAN WITH CODEINE) 6 25-10 mg/5 mL syrup, Take 5 mL by mouth every 4 (four) hours as needed for cough, Disp: 120 mL, Rfl: 0    traMADol (ULTRAM) 50 mg tablet, Take 50 mg by mouth 3 (three) times a day , Disp: , Rfl:     Review of Systems   Constitutional: Positive for unexpected weight change  Respiratory: Negative  Cardiovascular: Positive for leg swelling  Negative for chest pain and palpitations  Gastrointestinal: Negative  Genitourinary:        Nocturia   Musculoskeletal: Positive for arthralgias and back pain  R knee pain   Skin: Positive for rash  Neurological: Positive for headaches  Negative for dizziness  Psychiatric/Behavioral: Positive for sleep disturbance  Objective:    /80 (BP Location: Left arm, Patient Position: Sitting)   Pulse 93   Temp 97 8 °F (36 6 °C)   Resp 18   Ht 5' 4" (1 626 m)   Wt 119 kg (262 lb 9 6 oz)   SpO2 96%   BMI 45 08 kg/m²      Physical Exam   Constitutional: She is oriented to person, place, and time  She appears well-developed and well-nourished  No distress  HENT:   Mouth/Throat: Oropharynx is clear and moist    Neck: Neck supple  Cardiovascular: Normal rate, regular rhythm and normal heart sounds  RLE 1+pitting edema   Pulmonary/Chest: Effort normal and breath sounds normal  No respiratory distress  She has no wheezes  Musculoskeletal:        Lumbar back: She exhibits no tenderness and no spasm  Ambulates with a cane   Neurological: She is alert and oriented to person, place, and time  She has normal strength  Skin:   Mildly scattered erythema and hypopigmentation along upper anterior chest wall   Psychiatric: She has a normal mood and affect  Vitals reviewed

## 2018-04-30 NOTE — ASSESSMENT & PLAN NOTE
Discussed losing weight and being more active  She failed epidural inj x 3 and has been maintained on tramadol for breakthrough pain prn  She would benefit form PT, will refer

## 2018-06-14 ENCOUNTER — EVALUATION (OUTPATIENT)
Dept: PHYSICAL THERAPY | Facility: CLINIC | Age: 68
End: 2018-06-14
Payer: COMMERCIAL

## 2018-06-14 DIAGNOSIS — M54.16 LUMBAR RADICULOPATHY: Primary | ICD-10-CM

## 2018-06-14 PROCEDURE — 97535 SELF CARE MNGMENT TRAINING: CPT | Performed by: PHYSICAL THERAPIST

## 2018-06-14 PROCEDURE — 97162 PT EVAL MOD COMPLEX 30 MIN: CPT | Performed by: PHYSICAL THERAPIST

## 2018-06-14 PROCEDURE — G8978 MOBILITY CURRENT STATUS: HCPCS | Performed by: PHYSICAL THERAPIST

## 2018-06-14 PROCEDURE — G8979 MOBILITY GOAL STATUS: HCPCS | Performed by: PHYSICAL THERAPIST

## 2018-06-14 PROCEDURE — 97110 THERAPEUTIC EXERCISES: CPT | Performed by: PHYSICAL THERAPIST

## 2018-06-14 NOTE — PROGRESS NOTES
PT Evaluation     Today's date: 2018  Patient name: Pascual Quesada  : 1950  MRN: [id number]  Referring provider: Josemanuel Boswell DO  Dx:   Encounter Diagnosis     ICD-10-CM    1  Lumbar radiculopathy M54 16 Ambulatory referral to Physical Therapy                  Assessment  Impairments: abnormal gait, abnormal muscle tone, abnormal or restricted ROM, impaired balance, impaired physical strength and pain with function    Assessment details: The patient presents with s/s consistent with chronic lumbar radiculopathy  She demonstrates decreased ROM, strength, function, and endurance due to chronic lumbar and right LE pain  The patient would benefit from OPPT to address her deficits and meet her goals  Understanding of Dx/Px/POC: fair   Prognosis: poor    Goals  STG: To be completed in 4 weeks  1  The patient will report no more than 6/10 pain when standing while showering  2  The patient will increase lumbar flexion to fingertips 10 cm from the floor when picking an object off the floor  3  The patient is compliant with her HEP  LTG: To be completed in 8 weeks    1  The patient will report no more than 3/10 pain during all adls  2  The patient will report no trouble with dressing and showering in the morning  3  The patient will increase LE strength to 4+/5 MMT when standing and cooking a meal for 30 minutes         Plan  Patient would benefit from: skilled physical therapy  Planned modality interventions: cryotherapy and thermotherapy: hydrocollator packs  Other planned modality interventions: high level laser  Planned therapy interventions: manual therapy, joint mobilization, abdominal trunk stabilization, neuromuscular re-education, patient education, postural training, self care, strengthening, stretching, therapeutic activities, therapeutic exercise, home exercise program and functional ROM exercises  Frequency: 2x week  Treatment plan discussed with: patient and family  Plan details: POC expires 18        Subjective Evaluation    History of Present Illness  Date of onset: 2012  Mechanism of injury: Jluis Bledsoe is a 69 y o  female who presents with lumbar radiculopathy which started in   The patient had an MRI which showed a pinched nerve  The patient received an injection which gave her relief for 2-3 years  The patient reports occassional numbness in the thigh and does report pain down the lateral aspect of her leg to the calf  The patient notes no trouble sleeping at night due to her pain  The patient currently struggles with standing and walking for more than 5 minutes which inhibits showering and cooking  [place] PMH includes HTN, osteoporosis, morbid obesity  Recurrent probem    Quality of life: fair    Pain  Current pain rating: 3  At best pain ratin  At worst pain ratin  Relieving factors: medications and rest  Aggravating factors: standing and walking  Progression: worsening    Social Support  Steps to enter house: yes  Stairs in house: yes   Lives in: multiple-level home  Lives with: adult children    Treatments  Current treatment: physical therapy  Patient Goals  Patient goals for therapy: decreased pain          Objective     Active Range of Motion     Lumbar   Extension: 10 degrees with pain    Additional Active Range of Motion Details  Lumbar:   Flexion- fingertips 27 cm from the floor*  R Lateral flexion- fingertips 50 cm from the floor*  L Lateral flexion- fingertips 54 cm from the floor*    *Pain noted    Strength/Myotome Testing     Left Hip   Planes of Motion   Flexion: 4-  Abduction: 4-  Adduction: 4-    Right Hip   Planes of Motion   Flexion: 4-  Abduction: 4-  Adduction: 4-    Left Knee   Flexion: 4-  Extension: 4-    Right Knee   Flexion: 4-  Extension: 4-    Tests     Lumbar     Left   Negative passive SLR  Right   Positive passive SLR       Additional Tests Details  Possible extension directional preference          Precautions: HTN, morbid obesity, osteoporosis    Daily Treatment Diary     Manual  6/14            B Piriformis St              R Sciatic Nerve Glides                                                        Exercise Diary  6/14            TA Set 10"x10            TA Set c Marches 1x10ea            LTR- L only 5"x10            Seated Nerve glides 1x15 c PF            Ball Squeeze             Clamshells             STS                                                                                                                                                                                          Modalities

## 2018-06-20 ENCOUNTER — APPOINTMENT (OUTPATIENT)
Dept: PHYSICAL THERAPY | Facility: CLINIC | Age: 68
End: 2018-06-20
Payer: COMMERCIAL

## 2018-06-22 ENCOUNTER — APPOINTMENT (OUTPATIENT)
Dept: PHYSICAL THERAPY | Facility: CLINIC | Age: 68
End: 2018-06-22
Payer: COMMERCIAL

## 2018-06-26 ENCOUNTER — APPOINTMENT (OUTPATIENT)
Dept: PHYSICAL THERAPY | Facility: CLINIC | Age: 68
End: 2018-06-26
Payer: COMMERCIAL

## 2018-06-28 ENCOUNTER — OFFICE VISIT (OUTPATIENT)
Dept: PHYSICAL THERAPY | Facility: CLINIC | Age: 68
End: 2018-06-28
Payer: COMMERCIAL

## 2018-06-28 DIAGNOSIS — M54.16 LUMBAR RADICULOPATHY: Primary | ICD-10-CM

## 2018-06-28 PROCEDURE — 97140 MANUAL THERAPY 1/> REGIONS: CPT

## 2018-06-28 PROCEDURE — 97112 NEUROMUSCULAR REEDUCATION: CPT

## 2018-06-28 PROCEDURE — 97110 THERAPEUTIC EXERCISES: CPT

## 2018-06-28 NOTE — PROGRESS NOTES
Daily Note     Today's date: 2018  Patient name: Jluis Bledsoe  : 1950  MRN: [id number]  Referring provider: Lindsay Griffin DO  Dx:   Encounter Diagnosis     ICD-10-CM    1  Lumbar radiculopathy M54 16        Start Time:   Stop Time:   Total time in clinic (min): 40 minutes    Subjective: Patient had a cold this week and only completed their HEP a few times  Patient noted that the nerve glide sitting was a little painful on the back of her leg       Objective: See treatment diary below  Patient requires vcing t/o treatment for postioning and form of exercise  Patient had trouble squeezing the ball secondary to compensation but did improve towards the end of her reps  Patient educated on log roll technique to maintain spinal neutral for supine to sit  Assessment: Tolerated treatment fair  Patient demonstrated fatigue post treatment, exhibited good technique with therapeutic exercises and would benefit from continued PT      Plan: Continue per plan of care  Progress treatment as tolerated        Precautions: HTN, morbid obesity, osteoporosis    Daily Treatment Diary     Manual             B Piriformis St   15"x5           R Sciatic Nerve Glides                                                        Exercise Diary             TA Set 10"x10 10"x10           TA Set c Marches 1x10ea 2x10ea           LTR- L only 5"x10 5"x10           Seated Nerve glides 1x15 c PF 1x15  c PF           Ball Squeeze  2 2#  5"x10           Clamshells  YTB 2x10           STS  4x5                                                                                                                                                                                        Modalities

## 2018-07-11 ENCOUNTER — OFFICE VISIT (OUTPATIENT)
Dept: INTERNAL MEDICINE CLINIC | Facility: CLINIC | Age: 68
End: 2018-07-11
Payer: COMMERCIAL

## 2018-07-11 VITALS
TEMPERATURE: 97.7 F | WEIGHT: 268 LBS | RESPIRATION RATE: 16 BRPM | HEART RATE: 98 BPM | HEIGHT: 64 IN | DIASTOLIC BLOOD PRESSURE: 80 MMHG | OXYGEN SATURATION: 99 % | BODY MASS INDEX: 45.75 KG/M2 | SYSTOLIC BLOOD PRESSURE: 116 MMHG

## 2018-07-11 DIAGNOSIS — J30.1 SEASONAL ALLERGIC RHINITIS DUE TO POLLEN: ICD-10-CM

## 2018-07-11 DIAGNOSIS — H81.10 BENIGN PAROXYSMAL POSITIONAL VERTIGO, UNSPECIFIED LATERALITY: Primary | ICD-10-CM

## 2018-07-11 PROCEDURE — 3008F BODY MASS INDEX DOCD: CPT | Performed by: INTERNAL MEDICINE

## 2018-07-11 PROCEDURE — 99214 OFFICE O/P EST MOD 30 MIN: CPT | Performed by: INTERNAL MEDICINE

## 2018-07-11 PROCEDURE — 3725F SCREEN DEPRESSION PERFORMED: CPT | Performed by: INTERNAL MEDICINE

## 2018-07-11 RX ORDER — TRIAMCINOLONE ACETONIDE 1 MG/G
1 CREAM TOPICAL 2 TIMES DAILY
Refills: 0 | Status: ON HOLD | COMMUNITY
Start: 2018-05-21 | End: 2022-07-12 | Stop reason: SDUPTHER

## 2018-07-11 NOTE — ASSESSMENT & PLAN NOTE
Secondary to rhinosinusitis  Had one episode  Will treat sinuses and if vertigo returns or worsens, consider meclizine and/or vestibular rehab

## 2018-07-11 NOTE — PROGRESS NOTES
Assessment/Plan:    Seasonal allergic rhinitis due to pollen  Advised starting flonase nasal spray twice daily and zyrtec at bedtime  Call if symptoms worsen  BPPV (benign paroxysmal positional vertigo)  Secondary to rhinosinusitis  Had one episode  Will treat sinuses and if vertigo returns or worsens, consider meclizine and/or vestibular rehab  1  Benign paroxysmal positional vertigo, unspecified laterality     2  Seasonal allergic rhinitis due to pollen         Subjective:      Patient ID: Rae Coleman is a 69 y o  female  HPI  67yo female with HTN, lumbar radiculopathy, morbid obesity, osteoporosis, vitamin D def, RA and sarcoidosis here for evaluation of dizziness and sinus complaints  She is accompanied by her daughter  She has had an earache x 1 week, then two days ago she developed dizziness after getting up from bed  She reports spinning sensation even after closing her eyes, improved and then resolved after a few hours when she slept  She has had nasal congestion, facial pain, ear fullness, sore throat, occasional sneezing and coughing  She has felt warm and diaphoretic but has not taken her temp  She denies palpitations, syncope  She has not taken medications for her symptoms  The following portions of the patient's history were reviewed and updated as appropriate: allergies, current medications, past family history, past medical history, past social history, past surgical history and problem list     Current Outpatient Prescriptions:     acetaminophen (TYLENOL) 325 mg tablet, Take 2 tablets (650 mg total) by mouth every 4 (four) hours as needed for mild pain, headaches or fever  , Disp: 30 tablet, Rfl: 0    Adalimumab (HUMIRA PEN) 40 MG/0 8ML PNKT, Inject 40 mg under the skin every 14 (fourteen) days, Disp: , Rfl:     alendronate (FOSAMAX) 70 mg tablet, Take by mouth, Disp: , Rfl:     amLODIPine (NORVASC) 10 mg tablet, Take 1 tablet (10 mg total) by mouth daily, Disp: 90 tablet, Rfl: 1    cholecalciferol (VITAMIN D3) 1,000 units tablet, Take 1,000 Units by mouth daily, Disp: , Rfl:     hydroxychloroquine (PLAQUENIL) 200 mg tablet, Take 200 mg by mouth 2 (two) times a day , Disp: , Rfl:     multivitamin (THERAGRAN) TABS, Take 1 tablet by mouth daily, Disp: , Rfl:     omeprazole (PriLOSEC) 20 mg delayed release capsule, Take 1 capsule (20 mg total) by mouth daily, Disp: 90 capsule, Rfl: 3    predniSONE 5 mg tablet, Take 5 mg by mouth daily  1 mg prednisone 2 x/day, Disp: , Rfl:     promethazine-codeine (PHENERGAN WITH CODEINE) 6 25-10 mg/5 mL syrup, Take 5 mL by mouth every 4 (four) hours as needed for cough, Disp: 120 mL, Rfl: 0    traMADol (ULTRAM) 50 mg tablet, Take 50 mg by mouth 3 (three) times a day , Disp: , Rfl:     triamcinolone (KENALOG) 0 1 % cream, Apply 1 application topically 2 (two) times a day To affected area, Disp: , Rfl: 0    ketoconazole (NIZORAL) 2 % cream, Apply topically 2 (two) times a day, Disp: , Rfl:     Review of Systems   Constitutional: Negative for fever  HENT: Positive for congestion, ear pain, sinus pressure and sore throat  Respiratory: Positive for cough  Negative for shortness of breath and wheezing  Cardiovascular: Negative for palpitations  Gastrointestinal: Negative  Neurological: Positive for dizziness  Negative for headaches  Objective:    /80 (BP Location: Left arm, Patient Position: Sitting)   Pulse 98   Temp 97 7 °F (36 5 °C)   Resp 16   Ht 5' 4" (1 626 m)   Wt 122 kg (268 lb)   SpO2 99%   BMI 46 00 kg/m²      Physical Exam   Constitutional: She appears well-developed and well-nourished  No distress  HENT:   Right Ear: A middle ear effusion is present  Left Ear: A middle ear effusion is present  Nose: Mucosal edema present  Mouth/Throat: Oropharynx is clear and moist and mucous membranes are normal    Cardiovascular: Normal rate, regular rhythm and normal heart sounds      Pulmonary/Chest: Effort normal  No respiratory distress  She has no wheezes  Lymphadenopathy:     She has no cervical adenopathy  Neurological: She is alert  bradly hallpike positive   Vitals reviewed

## 2018-09-14 DIAGNOSIS — I10 BENIGN ESSENTIAL HYPERTENSION: ICD-10-CM

## 2018-09-14 RX ORDER — AMLODIPINE BESYLATE 10 MG/1
10 TABLET ORAL DAILY
Qty: 90 TABLET | Refills: 3 | Status: SHIPPED | OUTPATIENT
Start: 2018-09-14 | End: 2018-10-29 | Stop reason: SDUPTHER

## 2018-10-05 DIAGNOSIS — R21 RASH: Primary | ICD-10-CM

## 2018-10-05 RX ORDER — KETOCONAZOLE 20 MG/G
CREAM TOPICAL 2 TIMES DAILY
Qty: 30 G | Refills: 0 | Status: SHIPPED | OUTPATIENT
Start: 2018-10-05 | End: 2019-07-23 | Stop reason: SDUPTHER

## 2018-10-29 DIAGNOSIS — I10 BENIGN ESSENTIAL HYPERTENSION: ICD-10-CM

## 2018-10-29 RX ORDER — AMLODIPINE BESYLATE 10 MG
TABLET ORAL
Qty: 90 TABLET | Refills: 1 | Status: SHIPPED | OUTPATIENT
Start: 2018-10-29 | End: 2019-03-05 | Stop reason: SDUPTHER

## 2018-10-31 ENCOUNTER — OFFICE VISIT (OUTPATIENT)
Dept: INTERNAL MEDICINE CLINIC | Facility: CLINIC | Age: 68
End: 2018-10-31
Payer: COMMERCIAL

## 2018-10-31 VITALS
DIASTOLIC BLOOD PRESSURE: 80 MMHG | HEART RATE: 100 BPM | TEMPERATURE: 97.8 F | HEIGHT: 64 IN | WEIGHT: 262 LBS | RESPIRATION RATE: 16 BRPM | SYSTOLIC BLOOD PRESSURE: 124 MMHG | OXYGEN SATURATION: 98 % | BODY MASS INDEX: 44.73 KG/M2

## 2018-10-31 DIAGNOSIS — M06.9 RHEUMATOID ARTHRITIS, INVOLVING UNSPECIFIED SITE, UNSPECIFIED RHEUMATOID FACTOR PRESENCE: Chronic | ICD-10-CM

## 2018-10-31 DIAGNOSIS — I10 BENIGN ESSENTIAL HYPERTENSION: Primary | ICD-10-CM

## 2018-10-31 DIAGNOSIS — Z23 NEED FOR INFLUENZA VACCINATION: ICD-10-CM

## 2018-10-31 DIAGNOSIS — M81.0 OSTEOPOROSIS, UNSPECIFIED OSTEOPOROSIS TYPE, UNSPECIFIED PATHOLOGICAL FRACTURE PRESENCE: ICD-10-CM

## 2018-10-31 PROCEDURE — 3074F SYST BP LT 130 MM HG: CPT | Performed by: INTERNAL MEDICINE

## 2018-10-31 PROCEDURE — 99214 OFFICE O/P EST MOD 30 MIN: CPT | Performed by: INTERNAL MEDICINE

## 2018-10-31 PROCEDURE — 3079F DIAST BP 80-89 MM HG: CPT | Performed by: INTERNAL MEDICINE

## 2018-10-31 PROCEDURE — 4040F PNEUMOC VAC/ADMIN/RCVD: CPT

## 2018-10-31 PROCEDURE — 1036F TOBACCO NON-USER: CPT | Performed by: INTERNAL MEDICINE

## 2018-10-31 PROCEDURE — 3008F BODY MASS INDEX DOCD: CPT | Performed by: INTERNAL MEDICINE

## 2018-10-31 PROCEDURE — 1160F RVW MEDS BY RX/DR IN RCRD: CPT | Performed by: INTERNAL MEDICINE

## 2018-10-31 PROCEDURE — 1160F RVW MEDS BY RX/DR IN RCRD: CPT

## 2018-10-31 PROCEDURE — G0008 ADMIN INFLUENZA VIRUS VAC: HCPCS

## 2018-10-31 PROCEDURE — 90662 IIV NO PRSV INCREASED AG IM: CPT

## 2018-10-31 NOTE — ASSESSMENT & PLAN NOTE
Patient unable to wean down on prednisone 7mg due to return of shoulder pains  She also is on humira inj, which she is overdue for and she will call her Rheumatologist to get refill as well as plaquenil

## 2018-10-31 NOTE — PROGRESS NOTES
Assessment/Plan:    Benign essential hypertension  Benign, stable  Continue on norvasc 10mg daily  Osteoporosis  Admits to noncompliance with alendronate 70mg qweekly  Encouraged to take it as prescribed along with calcium +vitamin D  Patient has been on long term prednisone use  Next DXA due 2019 per Rheum  [facility] ([facility] )  Patient unable to wean down on prednisone 7mg due to return of shoulder pains  She also is on humira inj, which she is overdue for and she will call her Rheumatologist to get refill as well as plaquenil  1  Benign essential hypertension     2  Rheumatoid arthritis, involving unspecified site, unspecified rheumatoid factor presence ([facility] )     3  Osteoporosis, unspecified osteoporosis type, unspecified pathological fracture presence     4  Need for influenza vaccination  influenza vaccine, [id number], high-dose, PF 0 5 mL, for patients 65 yr+ (FLUZONE HIGH-DOSE)       Subjective:      Patient ID: Jonny Patrick is a 77 y o  female  67yo female with morbid obesity, osteoporosis, vitamin D def, RA, sarcoidosis and HTN here for follow up care  She has not gone for her labs  She is accompanied by her daughter  Hypertension   This is a chronic problem  The current episode started more than 1 year ago  The problem is controlled  Pertinent negatives include no chest pain or palpitations  Past treatments include calcium channel blockers  She is followed by Rheum for RA  Has shoulder pains, wrist pain  She reports she has been using Humira though she did run out 1-2weeks ago  She is taking prednisone 7mg daily  She has been not been compliant with taking fosamax because she does not like to wait  She is due for DXA next year      The following portions of the patient's history were reviewed and updated as appropriate: allergies, current medications, past family history, past medical history, past social history, past surgical history and problem list     Current Outpatient Prescriptions:     acetaminophen (TYLENOL) 325 mg tablet, Take 2 tablets (650 mg total) by mouth every 4 (four) hours as needed for mild pain, headaches or fever  , Disp: 30 tablet, Rfl: 0    Adalimumab (HUMIRA PEN) 40 MG/0 8ML PNKT, Inject 40 mg under the skin every 14 (fourteen) days, Disp: , Rfl:     alendronate (FOSAMAX) 70 mg tablet, Take by mouth, Disp: , Rfl:     cholecalciferol (VITAMIN D3) 1,000 units tablet, Take 1,000 Units by mouth daily, Disp: , Rfl:     hydroxychloroquine (PLAQUENIL) 200 mg tablet, Take 200 mg by mouth 2 (two) times a day , Disp: , Rfl:     ketoconazole (NIZORAL) 2 % cream, Apply topically 2 (two) times a day, Disp: 30 g, Rfl: 0    multivitamin (THERAGRAN) TABS, Take 1 tablet by mouth daily, Disp: , Rfl:     NORVASC 10 MG tablet, take 1 tablet by mouth once daily, Disp: 90 tablet, Rfl: 1    omeprazole (PriLOSEC) 20 mg delayed release capsule, Take 1 capsule (20 mg total) by mouth daily, Disp: 90 capsule, Rfl: 3    predniSONE 5 mg tablet, Take 5 mg by mouth daily  1 mg prednisone 2 x/day, Disp: , Rfl:     promethazine-codeine (PHENERGAN WITH CODEINE) 6 25-10 mg/5 mL syrup, Take 5 mL by mouth every 4 (four) hours as needed for cough, Disp: 120 mL, Rfl: 0    traMADol (ULTRAM) 50 mg tablet, Take 50 mg by mouth 3 (three) times a day , Disp: , Rfl:     triamcinolone (KENALOG) 0 1 % cream, Apply 1 application topically 2 (two) times a day To affected area, Disp: , Rfl: 0      Review of Systems   Constitutional: Negative  HENT: Negative  Respiratory: Negative  Cardiovascular: Positive for leg swelling  Negative for chest pain and palpitations  Musculoskeletal: Positive for arthralgias and back pain  Shoulder pain   Skin: Positive for rash  Psychiatric/Behavioral: Positive for dysphoric mood  Negative for sleep disturbance  The patient is nervous/anxious            Objective:    /80 (BP Location: Left arm, Patient Position: Sitting, Cuff Size: Standard)   Pulse 100   Temp 97 8 °F (36 6 °C)   Resp 16   Ht 5' 4" (1 626 m)   Wt 119 kg (262 lb)   SpO2 98%   BMI 44 97 kg/m²      Physical Exam   Constitutional: She appears well-developed and well-nourished  No distress  HENT:   Mouth/Throat: Oropharynx is clear and moist    Neck: Neck supple  Cardiovascular: Normal rate, regular rhythm, normal heart sounds and intact distal pulses  Pulmonary/Chest: Effort normal and breath sounds normal  No respiratory distress  She has no wheezes  Musculoskeletal:        Lumbar back: She exhibits no tenderness and no spasm  Ambulates with a cane  B/l shoulder ROM WNL  No small joint hypertrophy appreciated   Neurological: She is alert  She has normal strength  Skin: Skin is dry  Psychiatric: She has a normal mood and affect  Vitals reviewed

## 2018-10-31 NOTE — ASSESSMENT & PLAN NOTE
Admits to noncompliance with alendronate 70mg qweekly  Encouraged to take it as prescribed along with calcium +vitamin D  Patient has been on long term prednisone use  Next DXA due 2019 per Rheum

## 2018-11-15 ENCOUNTER — TELEPHONE (OUTPATIENT)
Dept: INTERNAL MEDICINE CLINIC | Facility: CLINIC | Age: 68
End: 2018-11-15

## 2018-11-15 DIAGNOSIS — R05.9 COUGH: ICD-10-CM

## 2018-11-15 RX ORDER — PROMETHAZINE HYDROCHLORIDE AND CODEINE PHOSPHATE 6.25; 1 MG/5ML; MG/5ML
5 SYRUP ORAL EVERY 4 HOURS PRN
Qty: 120 ML | Refills: 0 | Status: SHIPPED | OUTPATIENT
Start: 2018-11-15 | End: 2019-02-05 | Stop reason: SDUPTHER

## 2018-11-15 NOTE — TELEPHONE ENCOUNTER
Patient was here to see you the other week and wasn't coughing, she is coughing a lot and would like for you to call something in for her cough  She said you two did discuss a cough medicine and she didn't take it then but would like to have it now please    Thank you

## 2018-12-18 ENCOUNTER — APPOINTMENT (OUTPATIENT)
Dept: LAB | Facility: HOSPITAL | Age: 68
End: 2018-12-18
Payer: COMMERCIAL

## 2018-12-18 DIAGNOSIS — I10 BENIGN ESSENTIAL HYPERTENSION: ICD-10-CM

## 2018-12-18 DIAGNOSIS — M06.09 RHEUMATOID ARTHRITIS OF MULTIPLE SITES WITHOUT RHEUMATOID FACTOR (HCC): Primary | ICD-10-CM

## 2018-12-18 LAB
ALBUMIN SERPL BCP-MCNC: 3.2 G/DL (ref 3.5–5)
ALP SERPL-CCNC: 81 U/L (ref 46–116)
ALT SERPL W P-5'-P-CCNC: 21 U/L (ref 12–78)
ANION GAP SERPL CALCULATED.3IONS-SCNC: 7 MMOL/L (ref 4–13)
AST SERPL W P-5'-P-CCNC: 16 U/L (ref 5–45)
BASOPHILS # BLD AUTO: 0.03 THOUSANDS/ΜL (ref 0–0.1)
BASOPHILS NFR BLD AUTO: 1 % (ref 0–1)
BILIRUB SERPL-MCNC: 0.35 MG/DL (ref 0.2–1)
BUN SERPL-MCNC: 20 MG/DL (ref 5–25)
CALCIUM SERPL-MCNC: 8.9 MG/DL (ref 8.3–10.1)
CHLORIDE SERPL-SCNC: 109 MMOL/L (ref 100–108)
CHOLEST SERPL-MCNC: 153 MG/DL (ref 50–200)
CO2 SERPL-SCNC: 26 MMOL/L (ref 21–32)
CREAT SERPL-MCNC: 0.98 MG/DL (ref 0.6–1.3)
CRP SERPL QL: <3 MG/L
EOSINOPHIL # BLD AUTO: 0.11 THOUSAND/ΜL (ref 0–0.61)
EOSINOPHIL NFR BLD AUTO: 2 % (ref 0–6)
ERYTHROCYTE [DISTWIDTH] IN BLOOD BY AUTOMATED COUNT: 14.9 % (ref 11.6–15.1)
ERYTHROCYTE [SEDIMENTATION RATE] IN BLOOD: 25 MM/HOUR (ref 0–20)
GFR SERPL CREATININE-BSD FRML MDRD: 68 ML/MIN/1.73SQ M
GLUCOSE P FAST SERPL-MCNC: 81 MG/DL (ref 65–99)
HCT VFR BLD AUTO: 41.3 % (ref 34.8–46.1)
HDLC SERPL-MCNC: 58 MG/DL (ref 40–60)
HGB BLD-MCNC: 12.5 G/DL (ref 11.5–15.4)
IMM GRANULOCYTES # BLD AUTO: 0.02 THOUSAND/UL (ref 0–0.2)
IMM GRANULOCYTES NFR BLD AUTO: 0 % (ref 0–2)
LDLC SERPL CALC-MCNC: 81 MG/DL (ref 0–100)
LYMPHOCYTES # BLD AUTO: 1.14 THOUSANDS/ΜL (ref 0.6–4.47)
LYMPHOCYTES NFR BLD AUTO: 18 % (ref 14–44)
MCH RBC QN AUTO: 27 PG (ref 26.8–34.3)
MCHC RBC AUTO-ENTMCNC: 30.3 G/DL (ref 31.4–37.4)
MCV RBC AUTO: 89 FL (ref 82–98)
MONOCYTES # BLD AUTO: 0.62 THOUSAND/ΜL (ref 0.17–1.22)
MONOCYTES NFR BLD AUTO: 10 % (ref 4–12)
NEUTROPHILS # BLD AUTO: 4.51 THOUSANDS/ΜL (ref 1.85–7.62)
NEUTS SEG NFR BLD AUTO: 69 % (ref 43–75)
NONHDLC SERPL-MCNC: 95 MG/DL
NRBC BLD AUTO-RTO: 0 /100 WBCS
PLATELET # BLD AUTO: 187 THOUSANDS/UL (ref 149–390)
PMV BLD AUTO: 11.8 FL (ref 8.9–12.7)
POTASSIUM SERPL-SCNC: 3.4 MMOL/L (ref 3.5–5.3)
PROT SERPL-MCNC: 7.4 G/DL (ref 6.4–8.2)
RBC # BLD AUTO: 4.63 MILLION/UL (ref 3.81–5.12)
SODIUM SERPL-SCNC: 142 MMOL/L (ref 136–145)
TRIGL SERPL-MCNC: 68 MG/DL
TSH SERPL DL<=0.05 MIU/L-ACNC: 3.29 UIU/ML (ref 0.36–3.74)
WBC # BLD AUTO: 6.43 THOUSAND/UL (ref 4.31–10.16)

## 2018-12-18 PROCEDURE — 85025 COMPLETE CBC W/AUTO DIFF WBC: CPT

## 2018-12-18 PROCEDURE — 36415 COLL VENOUS BLD VENIPUNCTURE: CPT

## 2018-12-18 PROCEDURE — 80061 LIPID PANEL: CPT

## 2018-12-18 PROCEDURE — 86140 C-REACTIVE PROTEIN: CPT

## 2018-12-18 PROCEDURE — 84443 ASSAY THYROID STIM HORMONE: CPT

## 2018-12-18 PROCEDURE — 80053 COMPREHEN METABOLIC PANEL: CPT

## 2018-12-18 PROCEDURE — 86480 TB TEST CELL IMMUN MEASURE: CPT

## 2018-12-18 PROCEDURE — 85652 RBC SED RATE AUTOMATED: CPT

## 2018-12-19 LAB
GAMMA INTERFERON BACKGROUND BLD IA-ACNC: 0.02 IU/ML
M TB IFN-G BLD-IMP: NEGATIVE
M TB IFN-G CD4+ BCKGRND COR BLD-ACNC: 0 IU/ML
M TB IFN-G CD4+ BCKGRND COR BLD-ACNC: 0 IU/ML
MITOGEN IGNF BCKGRD COR BLD-ACNC: >10 IU/ML

## 2019-02-04 NOTE — PROGRESS NOTES
Assessment/Plan:    Acute rhinosinusitis  Suspect viral at this time  Recommend take tessalon perles during the day and prometahzine-codeine in the evening  Side effects discussed  Call at end of week if symptoms not improved and will consider abx then  May need to hold next dose of Humira next week if still not better  1  Acute rhinosinusitis  benzonatate (TESSALON PERLES) 100 mg capsule    promethazine-codeine (PHENERGAN WITH CODEINE) 6 25-10 mg/5 mL syrup       Subjective:      Patient ID: Tere Jama is a 77 y o  female  HPI  78-year-old female with HTN, osteoporosis, RA, sarcoidosis, morbid obesity and vitamin-D deficiency here for evaluation of cold symptoms  She presents with her grand-daughter  She reports moist cough x four days ago  Has ear fullness, nasal congestion, She denies HA, dizziness, fever, sore throat, SOB or nausea  She reports her grand-daughter has had viral bronchitis  She has taken flonase for her symptoms, which are unchanged since they started  She is up to date with her influenza vaccine  She last took Humira one week ago  The following portions of the patient's history were reviewed and updated as appropriate: allergies, current medications, past family history, past medical history, past social history, past surgical history and problem list     Current Outpatient Prescriptions:     acetaminophen (TYLENOL) 325 mg tablet, Take 2 tablets (650 mg total) by mouth every 4 (four) hours as needed for mild pain, headaches or fever  , Disp: 30 tablet, Rfl: 0    Adalimumab (HUMIRA PEN) 40 MG/0 8ML PNKT, Inject 40 mg under the skin every 14 (fourteen) days, Disp: , Rfl:     alendronate (FOSAMAX) 70 mg tablet, Take by mouth, Disp: , Rfl:     cholecalciferol (VITAMIN D3) 1,000 units tablet, Take 1,000 Units by mouth daily, Disp: , Rfl:     hydroxychloroquine (PLAQUENIL) 200 mg tablet, Take 200 mg by mouth 2 (two) times a day , Disp: , Rfl:     ketoconazole (NIZORAL) 2 % cream, Apply topically 2 (two) times a day, Disp: 30 g, Rfl: 0    multivitamin (THERAGRAN) TABS, Take 1 tablet by mouth daily, Disp: , Rfl:     NORVASC 10 MG tablet, take 1 tablet by mouth once daily, Disp: 90 tablet, Rfl: 1    omeprazole (PriLOSEC) 20 mg delayed release capsule, Take 1 capsule (20 mg total) by mouth daily, Disp: 90 capsule, Rfl: 3    predniSONE 5 mg tablet, Take 5 mg by mouth daily  1 mg prednisone 2 x/day, Disp: , Rfl:     traMADol (ULTRAM) 50 mg tablet, Take 50 mg by mouth 3 (three) times a day , Disp: , Rfl:     triamcinolone (KENALOG) 0 1 % cream, Apply 1 application topically 2 (two) times a day To affected area, Disp: , Rfl: 0    benzonatate (TESSALON PERLES) 100 mg capsule, Take 1 capsule (100 mg total) by mouth 3 (three) times a day as needed for cough for up to 7 days Please deliver, Disp: 21 capsule, Rfl: 0    promethazine-codeine (PHENERGAN WITH CODEINE) 6 25-10 mg/5 mL syrup, Take 5 mL by mouth every 4 (four) hours as needed for cough, Disp: 120 mL, Rfl: 0    Review of Systems   Constitutional: Negative for fever  HENT: Positive for congestion, postnasal drip, rhinorrhea and sinus pressure  Negative for sore throat  Ear fullness   Respiratory: Positive for cough  Negative for shortness of breath  Neurological: Negative for dizziness and headaches  Objective:    /80 (BP Location: Left arm, Patient Position: Sitting)   Pulse 98   Temp (!) 97 °F (36 1 °C)   Resp 16   Ht 5' 4" (1 626 m)   Wt 121 kg (266 lb)   SpO2 99%   BMI 45 66 kg/m²      Physical Exam   Constitutional: She appears well-developed and well-nourished  Morbidly obese   HENT:   Right Ear: External ear normal    Left Ear: External ear normal    Nose: Rhinorrhea present  Mouth/Throat: Oropharynx is clear and moist  No oropharyngeal exudate  Cardiovascular: Normal rate, regular rhythm and normal heart sounds      Pulmonary/Chest: Effort normal and breath sounds normal  She has no wheezes  Moist cough   Lymphadenopathy:     She has no cervical adenopathy  Neurological: She is alert  Vitals reviewed

## 2019-02-05 ENCOUNTER — OFFICE VISIT (OUTPATIENT)
Dept: INTERNAL MEDICINE CLINIC | Facility: CLINIC | Age: 69
End: 2019-02-05
Payer: COMMERCIAL

## 2019-02-05 VITALS
HEART RATE: 98 BPM | SYSTOLIC BLOOD PRESSURE: 124 MMHG | WEIGHT: 266 LBS | OXYGEN SATURATION: 99 % | TEMPERATURE: 97 F | RESPIRATION RATE: 16 BRPM | BODY MASS INDEX: 45.41 KG/M2 | HEIGHT: 64 IN | DIASTOLIC BLOOD PRESSURE: 80 MMHG

## 2019-02-05 DIAGNOSIS — J01.90 ACUTE RHINOSINUSITIS: Primary | ICD-10-CM

## 2019-02-05 PROCEDURE — 1160F RVW MEDS BY RX/DR IN RCRD: CPT | Performed by: INTERNAL MEDICINE

## 2019-02-05 PROCEDURE — 3008F BODY MASS INDEX DOCD: CPT | Performed by: INTERNAL MEDICINE

## 2019-02-05 PROCEDURE — 1036F TOBACCO NON-USER: CPT | Performed by: INTERNAL MEDICINE

## 2019-02-05 PROCEDURE — 99213 OFFICE O/P EST LOW 20 MIN: CPT | Performed by: INTERNAL MEDICINE

## 2019-02-05 RX ORDER — BENZONATATE 100 MG/1
100 CAPSULE ORAL 3 TIMES DAILY PRN
Qty: 21 CAPSULE | Refills: 0 | Status: SHIPPED | OUTPATIENT
Start: 2019-02-05 | End: 2019-02-12

## 2019-02-05 RX ORDER — PROMETHAZINE HYDROCHLORIDE AND CODEINE PHOSPHATE 6.25; 1 MG/5ML; MG/5ML
5 SYRUP ORAL EVERY 4 HOURS PRN
Qty: 120 ML | Refills: 0 | Status: SHIPPED | OUTPATIENT
Start: 2019-02-05 | End: 2020-06-18 | Stop reason: SDUPTHER

## 2019-02-05 NOTE — ASSESSMENT & PLAN NOTE
Suspect viral at this time  Recommend take tessalon perles during the day and prometahzine-codeine in the evening  Side effects discussed  Call at end of week if symptoms not improved and will consider abx then  May need to hold next dose of Humira next week if still not better

## 2019-02-11 ENCOUNTER — TELEPHONE (OUTPATIENT)
Dept: INTERNAL MEDICINE CLINIC | Facility: CLINIC | Age: 69
End: 2019-02-11

## 2019-02-20 ENCOUNTER — TELEPHONE (OUTPATIENT)
Dept: INTERNAL MEDICINE CLINIC | Facility: CLINIC | Age: 69
End: 2019-02-20

## 2019-03-05 DIAGNOSIS — I10 BENIGN ESSENTIAL HYPERTENSION: ICD-10-CM

## 2019-03-05 RX ORDER — AMLODIPINE BESYLATE 10 MG
10 TABLET ORAL DAILY
Qty: 90 TABLET | Refills: 3 | Status: SHIPPED | OUTPATIENT
Start: 2019-03-05 | End: 2019-07-23 | Stop reason: SDUPTHER

## 2019-04-23 DIAGNOSIS — I10 BENIGN ESSENTIAL HYPERTENSION: ICD-10-CM

## 2019-04-23 RX ORDER — AMLODIPINE BESYLATE 10 MG
TABLET ORAL
Qty: 90 TABLET | Refills: 1 | Status: SHIPPED | OUTPATIENT
Start: 2019-04-23 | End: 2019-10-25 | Stop reason: SDUPTHER

## 2019-05-21 ENCOUNTER — TELEPHONE (OUTPATIENT)
Dept: INTERNAL MEDICINE CLINIC | Facility: CLINIC | Age: 69
End: 2019-05-21

## 2019-05-22 ENCOUNTER — TELEPHONE (OUTPATIENT)
Dept: INTERNAL MEDICINE CLINIC | Facility: CLINIC | Age: 69
End: 2019-05-22

## 2019-07-10 DIAGNOSIS — K21.9 GASTROESOPHAGEAL REFLUX DISEASE WITHOUT ESOPHAGITIS: ICD-10-CM

## 2019-07-10 RX ORDER — OMEPRAZOLE 20 MG/1
CAPSULE, DELAYED RELEASE ORAL
Qty: 90 CAPSULE | Refills: 0 | Status: SHIPPED | OUTPATIENT
Start: 2019-07-10 | End: 2019-10-31 | Stop reason: SDUPTHER

## 2019-07-16 ENCOUNTER — APPOINTMENT (OUTPATIENT)
Dept: LAB | Facility: HOSPITAL | Age: 69
End: 2019-07-16
Payer: COMMERCIAL

## 2019-07-16 ENCOUNTER — TRANSCRIBE ORDERS (OUTPATIENT)
Dept: LAB | Facility: HOSPITAL | Age: 69
End: 2019-07-16

## 2019-07-16 DIAGNOSIS — M06.09 RHEUMATOID ARTHRITIS OF MULTIPLE SITES WITHOUT RHEUMATOID FACTOR (HCC): Primary | ICD-10-CM

## 2019-07-16 LAB
ALBUMIN SERPL BCP-MCNC: 3.5 G/DL (ref 3.5–5)
ALP SERPL-CCNC: 86 U/L (ref 46–116)
ALT SERPL W P-5'-P-CCNC: 23 U/L (ref 12–78)
ANION GAP SERPL CALCULATED.3IONS-SCNC: 4 MMOL/L (ref 4–13)
AST SERPL W P-5'-P-CCNC: 14 U/L (ref 5–45)
BASOPHILS # BLD AUTO: 0.05 THOUSANDS/ΜL (ref 0–0.1)
BASOPHILS NFR BLD AUTO: 1 % (ref 0–1)
BILIRUB SERPL-MCNC: 0.34 MG/DL (ref 0.2–1)
BUN SERPL-MCNC: 16 MG/DL (ref 5–25)
CALCIUM SERPL-MCNC: 9.2 MG/DL (ref 8.3–10.1)
CHLORIDE SERPL-SCNC: 106 MMOL/L (ref 100–108)
CO2 SERPL-SCNC: 27 MMOL/L (ref 21–32)
CREAT SERPL-MCNC: 0.92 MG/DL (ref 0.6–1.3)
CRP SERPL QL: 3 MG/L
EOSINOPHIL # BLD AUTO: 0.07 THOUSAND/ΜL (ref 0–0.61)
EOSINOPHIL NFR BLD AUTO: 1 % (ref 0–6)
ERYTHROCYTE [DISTWIDTH] IN BLOOD BY AUTOMATED COUNT: 15 % (ref 11.6–15.1)
ERYTHROCYTE [SEDIMENTATION RATE] IN BLOOD: 17 MM/HOUR (ref 0–20)
GFR SERPL CREATININE-BSD FRML MDRD: 73 ML/MIN/1.73SQ M
GLUCOSE SERPL-MCNC: 85 MG/DL (ref 65–140)
HCT VFR BLD AUTO: 47.2 % (ref 34.8–46.1)
HGB BLD-MCNC: 14.4 G/DL (ref 11.5–15.4)
IMM GRANULOCYTES # BLD AUTO: 0.03 THOUSAND/UL (ref 0–0.2)
IMM GRANULOCYTES NFR BLD AUTO: 0 % (ref 0–2)
LYMPHOCYTES # BLD AUTO: 0.72 THOUSANDS/ΜL (ref 0.6–4.47)
LYMPHOCYTES NFR BLD AUTO: 9 % (ref 14–44)
MCH RBC QN AUTO: 27.6 PG (ref 26.8–34.3)
MCHC RBC AUTO-ENTMCNC: 30.5 G/DL (ref 31.4–37.4)
MCV RBC AUTO: 90 FL (ref 82–98)
MONOCYTES # BLD AUTO: 0.67 THOUSAND/ΜL (ref 0.17–1.22)
MONOCYTES NFR BLD AUTO: 8 % (ref 4–12)
NEUTROPHILS # BLD AUTO: 6.72 THOUSANDS/ΜL (ref 1.85–7.62)
NEUTS SEG NFR BLD AUTO: 81 % (ref 43–75)
NRBC BLD AUTO-RTO: 0 /100 WBCS
PLATELET # BLD AUTO: 200 THOUSANDS/UL (ref 149–390)
PMV BLD AUTO: 12.1 FL (ref 8.9–12.7)
POTASSIUM SERPL-SCNC: 3.7 MMOL/L (ref 3.5–5.3)
PROT SERPL-MCNC: 7.9 G/DL (ref 6.4–8.2)
RBC # BLD AUTO: 5.22 MILLION/UL (ref 3.81–5.12)
SODIUM SERPL-SCNC: 137 MMOL/L (ref 136–145)
WBC # BLD AUTO: 8.26 THOUSAND/UL (ref 4.31–10.16)

## 2019-07-16 PROCEDURE — 80053 COMPREHEN METABOLIC PANEL: CPT

## 2019-07-16 PROCEDURE — 85652 RBC SED RATE AUTOMATED: CPT

## 2019-07-16 PROCEDURE — 36415 COLL VENOUS BLD VENIPUNCTURE: CPT

## 2019-07-16 PROCEDURE — 85025 COMPLETE CBC W/AUTO DIFF WBC: CPT

## 2019-07-16 PROCEDURE — 86140 C-REACTIVE PROTEIN: CPT

## 2019-07-22 NOTE — PROGRESS NOTES
Assessment/Plan:    Problem List Items Addressed This Visit        Digestive    GERD (gastroesophageal reflux disease) (Chronic)     Recommend wean off omeprazole to 20mg every other day x 2 weeks then stop  Take H2B or TUMS for breakthrough symptoms  Patient had underlying osteoporosis  Cardiovascular and Mediastinum    Benign essential hypertension - Primary     Stable  Continue on Norvasc 10mg daily  Reminded to adhere to low sodium diet and to lose weight  Relevant Orders    Comprehensive hearing evaluation    Lipid panel       Musculoskeletal and Integument    Rheumatoid arthritis ([address] ) (Chronic)     Stable  Patient encouraged to decrease prednisone from 5mg in AM and 2 5mg in PM to 5mg daily  She is to remain on Humira [id number] and plaquenil 200mg two tabs daily  Follow up with Rheum  Relevant Orders    Comprehensive hearing evaluation       Other    Vitamin D deficiency    Relevant Orders    Vitamin D 25 hydroxy    Morbid obesity with BMI of 40 0-44 9, adult (HCC)     BMI 44 with comorbidities  Physical activity has been limited due to joint pains related to RA, advised aquatic therapy  Also, decrease portion sizes  Other Visit Diagnoses     Decreased hearing of both ears        refer for audiogram    Medicare annual wellness visit, subsequent        Need for hepatitis C screening test        Relevant Orders    Hepatitis C antibody    Encounter for screening mammogram for breast cancer        Relevant Orders    Mammo screening bilateral w cad          Subjective:      Patient ID: Guerline Leigh is a 68 y o  female  75yo female with osteoporosis, RA, HTN, sarcoidosis, vitamin D def here for follow up care  She is accompanied by her daughter  Hypertension   This is a chronic problem  The current episode started more than 1 year ago  The problem is controlled  Associated symptoms include headaches  Past treatments include calcium channel blockers       She gets knee and back pain, denies joint swelling  She has not had Humira for the past month due to cost   She is compliant with plaquenil and was recently to the ophthalmologist   Dose of prednisone remains the same at 5mg in AM and 2 5mg in PM     She does not get reflux when she takes omeprazole  When she has not taken it, she has had return of her symptoms  Reports decreased hearing, has to turn up volume on television  The following portions of the patient's history were reviewed and updated as appropriate: allergies, current medications, past family history, past medical history, past social history, past surgical history and problem list     Current Outpatient Medications:     acetaminophen (TYLENOL) 325 mg tablet, Take 2 tablets (650 mg total) by mouth every 4 (four) hours as needed for mild pain, headaches or fever  , Disp: 30 tablet, Rfl: 0    Adalimumab (HUMIRA PEN) 40 MG/0 8ML PNKT, Inject 40 mg under the skin every 14 (fourteen) days, Disp: , Rfl:     alendronate (FOSAMAX) 70 mg tablet, Take by mouth every 7 days , Disp: , Rfl:     cholecalciferol (VITAMIN D3) 1,000 units tablet, Take 1,000 Units by mouth daily, Disp: , Rfl:     hydroxychloroquine (PLAQUENIL) 200 mg tablet, Take 200 mg by mouth 2 (two) times a day , Disp: , Rfl:     ketoconazole (NIZORAL) 2 % cream, Apply topically 2 (two) times a day, Disp: 30 g, Rfl: 0    multivitamin (THERAGRAN) TABS, Take 1 tablet by mouth daily, Disp: , Rfl:     NORVASC 10 MG tablet, take 1 tablet by mouth once daily, Disp: 90 tablet, Rfl: 1    omeprazole (PriLOSEC) 20 mg delayed release capsule, take 1 capsule by mouth once daily, Disp: 90 capsule, Rfl: 0    predniSONE 5 mg tablet, Take 5 mg by mouth daily   1 mg prednisone 2 x/day, Disp: , Rfl:     traMADol (ULTRAM) 50 mg tablet, Take 50 mg by mouth 3 (three) times a day , Disp: , Rfl:     triamcinolone (KENALOG) 0 1 % cream, Apply 1 application topically 2 (two) times a day To affected area, Disp: , Rfl: 0    promethazine-codeine (PHENERGAN WITH CODEINE) 6 25-10 mg/5 mL syrup, Take 5 mL by mouth every 4 (four) hours as needed for cough (Patient not taking: Reported on 7/23/2019), Disp: 120 mL, Rfl: 0      Review of Systems   Constitutional:        +minimal weight loss   HENT: Positive for hearing loss  Respiratory: Negative  Cardiovascular: Negative  Gastrointestinal:        Heartburn   Genitourinary: Positive for urgency  Incontinence   Musculoskeletal: Positive for arthralgias and back pain  Negative for joint swelling  Skin: Positive for rash  Neurological: Positive for headaches  Negative for dizziness  Psychiatric/Behavioral: Negative  Objective:      /84 (BP Location: Left arm, Patient Position: Sitting)   Pulse 92   Temp 98 1 °F (36 7 °C)   Resp 16   Ht 5' 4" (1 626 m)   Wt 118 kg (260 lb 6 4 oz)   SpO2 99%   BMI 44 70 kg/m²      Physical Exam   Constitutional: She is oriented to person, place, and time  She appears well-developed and well-nourished  No distress  Morbidly obese   HENT:   Right Ear: External ear normal    Left Ear: External ear normal    Nose: Nose normal    Mouth/Throat: Oropharynx is clear and moist  No oropharyngeal exudate  Neck: Neck supple  Cardiovascular: Normal rate, regular rhythm, normal heart sounds and intact distal pulses  Pulmonary/Chest: Effort normal and breath sounds normal  No stridor  No respiratory distress  Abdominal: Soft  Bowel sounds are normal  She exhibits no distension  There is no tenderness  Musculoskeletal:   B/l knee crepitus  B/l small joint hypertrophy without effusion   Neurological: She is alert and oriented to person, place, and time  Skin: Skin is dry  Areas of desquamation on upper chest wall, neck   Psychiatric: She has a normal mood and affect  Her behavior is normal    Vitals reviewed        Recent Results (from the past 336 hour(s))   Sedimentation rate, automated    Collection Time: 07/16/19 11:37 AM   Result Value Ref Range    Sed Rate 17 0 - 20 mm/hour   Comprehensive metabolic panel    Collection Time: 07/16/19 11:37 AM   Result Value Ref Range    Sodium 137 136 - 145 mmol/L    Potassium 3 7 3 5 - 5 3 mmol/L    Chloride 106 100 - 108 mmol/L    CO2 27 21 - 32 mmol/L    ANION GAP 4 4 - 13 mmol/L    BUN 16 5 - 25 mg/dL    Creatinine 0 92 0 60 - 1 30 mg/dL    Glucose 85 65 - 140 mg/dL    Calcium 9 2 8 3 - 10 1 mg/dL    AST 14 5 - 45 U/L    ALT 23 12 - 78 U/L    Alkaline Phosphatase 86 46 - 116 U/L    Total Protein 7 9 6 4 - 8 2 g/dL    Albumin 3 5 3 5 - 5 0 g/dL    Total Bilirubin 0 34 0 20 - 1 00 mg/dL    eGFR 73 ml/min/1 73sq m   C-reactive protein    Collection Time: 07/16/19 11:37 AM   Result Value Ref Range    CRP 3 0 (H) <3 0 mg/L   CBC and differential    Collection Time: 07/16/19 11:37 AM   Result Value Ref Range    WBC 8 26 4 31 - 10 16 Thousand/uL    RBC 5 22 (H) 3 81 - 5 12 Million/uL    Hemoglobin 14 4 11 5 - 15 4 g/dL    Hematocrit 47 2 (H) 34 8 - 46 1 %    MCV 90 82 - 98 fL    MCH 27 6 26 8 - 34 3 pg    MCHC 30 5 (L) 31 4 - 37 4 g/dL    RDW 15 0 11 6 - 15 1 %    MPV 12 1 8 9 - 12 7 fL    Platelets 183 604 - 101 Thousands/uL    nRBC 0 /100 WBCs    Neutrophils Relative 81 (H) 43 - 75 %    Immat GRANS % 0 0 - 2 %    Lymphocytes Relative 9 (L) 14 - 44 %    Monocytes Relative 8 4 - 12 %    Eosinophils Relative 1 0 - 6 %    Basophils Relative 1 0 - 1 %    Neutrophils Absolute 6 72 1 85 - 7 62 Thousands/µL    Immature Grans Absolute 0 03 0 00 - 0 20 Thousand/uL    Lymphocytes Absolute 0 72 0 60 - 4 47 Thousands/µL    Monocytes Absolute 0 67 0 17 - 1 22 Thousand/µL    Eosinophils Absolute 0 07 0 00 - 0 61 Thousand/µL    Basophils Absolute 0 05 0 00 - 0 10 Thousands/µL

## 2019-07-23 ENCOUNTER — OFFICE VISIT (OUTPATIENT)
Dept: INTERNAL MEDICINE CLINIC | Facility: CLINIC | Age: 69
End: 2019-07-23
Payer: COMMERCIAL

## 2019-07-23 ENCOUNTER — TELEPHONE (OUTPATIENT)
Dept: INTERNAL MEDICINE CLINIC | Facility: CLINIC | Age: 69
End: 2019-07-23

## 2019-07-23 VITALS
DIASTOLIC BLOOD PRESSURE: 84 MMHG | WEIGHT: 260.4 LBS | HEIGHT: 64 IN | OXYGEN SATURATION: 99 % | SYSTOLIC BLOOD PRESSURE: 122 MMHG | TEMPERATURE: 98.1 F | BODY MASS INDEX: 44.46 KG/M2 | HEART RATE: 92 BPM | RESPIRATION RATE: 16 BRPM

## 2019-07-23 DIAGNOSIS — Z12.31 ENCOUNTER FOR SCREENING MAMMOGRAM FOR BREAST CANCER: ICD-10-CM

## 2019-07-23 DIAGNOSIS — I10 BENIGN ESSENTIAL HYPERTENSION: Primary | ICD-10-CM

## 2019-07-23 DIAGNOSIS — Z00.00 MEDICARE ANNUAL WELLNESS VISIT, SUBSEQUENT: ICD-10-CM

## 2019-07-23 DIAGNOSIS — R21 RASH: ICD-10-CM

## 2019-07-23 DIAGNOSIS — E66.01 MORBID OBESITY WITH BMI OF 40.0-44.9, ADULT (HCC): ICD-10-CM

## 2019-07-23 DIAGNOSIS — M06.9 RHEUMATOID ARTHRITIS, INVOLVING UNSPECIFIED SITE, UNSPECIFIED RHEUMATOID FACTOR PRESENCE: Chronic | ICD-10-CM

## 2019-07-23 DIAGNOSIS — H91.93 DECREASED HEARING OF BOTH EARS: ICD-10-CM

## 2019-07-23 DIAGNOSIS — K21.9 GASTROESOPHAGEAL REFLUX DISEASE WITHOUT ESOPHAGITIS: Chronic | ICD-10-CM

## 2019-07-23 DIAGNOSIS — Z11.59 NEED FOR HEPATITIS C SCREENING TEST: ICD-10-CM

## 2019-07-23 DIAGNOSIS — E55.9 VITAMIN D DEFICIENCY: ICD-10-CM

## 2019-07-23 PROBLEM — J01.90 ACUTE RHINOSINUSITIS: Status: RESOLVED | Noted: 2019-02-05 | Resolved: 2019-07-23

## 2019-07-23 PROCEDURE — 1170F FXNL STATUS ASSESSED: CPT | Performed by: INTERNAL MEDICINE

## 2019-07-23 PROCEDURE — 1036F TOBACCO NON-USER: CPT | Performed by: INTERNAL MEDICINE

## 2019-07-23 PROCEDURE — 3008F BODY MASS INDEX DOCD: CPT | Performed by: INTERNAL MEDICINE

## 2019-07-23 PROCEDURE — 1125F AMNT PAIN NOTED PAIN PRSNT: CPT | Performed by: INTERNAL MEDICINE

## 2019-07-23 PROCEDURE — 99214 OFFICE O/P EST MOD 30 MIN: CPT | Performed by: INTERNAL MEDICINE

## 2019-07-23 PROCEDURE — G0439 PPPS, SUBSEQ VISIT: HCPCS | Performed by: INTERNAL MEDICINE

## 2019-07-23 PROCEDURE — 3074F SYST BP LT 130 MM HG: CPT | Performed by: INTERNAL MEDICINE

## 2019-07-23 PROCEDURE — 1160F RVW MEDS BY RX/DR IN RCRD: CPT | Performed by: INTERNAL MEDICINE

## 2019-07-23 RX ORDER — KETOCONAZOLE 20 MG/G
CREAM TOPICAL 2 TIMES DAILY
Qty: 60 G | Refills: 0 | Status: SHIPPED | OUTPATIENT
Start: 2019-07-23

## 2019-07-23 NOTE — PATIENT INSTRUCTIONS
Obesity   AMBULATORY CARE:   Obesity  is when your body mass index (BMI) is greater than 30  Your healthcare provider will use your height and weight to measure your BMI  The risks of obesity include  many health problems, such as injuries or physical disability  You may need tests to check for the following:  · Diabetes     · High blood pressure or high cholesterol     · Heart disease     · Gallbladder or liver disease     · Cancer of the colon, breast, prostate, liver, or kidney     · Sleep apnea     · Arthritis or gout  Seek care immediately if:   · You have a severe headache, confusion, or difficulty speaking  · You have weakness on one side of your body  · You have chest pain, sweating, or shortness of breath  Contact your healthcare provider if:   · You have symptoms of gallbladder or liver disease, such as pain in your upper abdomen  · You have knee or hip pain and discomfort while walking  · You have symptoms of diabetes, such as intense hunger and thirst, and frequent urination  · You have symptoms of sleep apnea, such as snoring or daytime sleepiness  · You have questions or concerns about your condition or care  Treatment for obesity  focuses on helping you lose weight to improve your health  Even a small decrease in BMI can reduce the risk for many health problems  Your healthcare provider will help you set a weight-loss goal   · Lifestyle changes  are the first step in treating obesity  These include making healthy food choices and getting regular physical activity  Your healthcare provider may suggest a weight-loss program that involves coaching, education, and therapy  · Medicine  may help you lose weight when it is used with a healthy diet and physical activity  · Surgery  can help you lose weight if you are very obese and have other health problems  There are several types of weight-loss surgery  Ask your healthcare provider for more information    Be successful losing weight:   · Set small, realistic goals  An example of a small goal is to walk for 20 minutes 5 days a week  Anther goal is to lose 5% of your body weight  · Tell friends, family members, and coworkers about your goals  and ask for their support  Ask a friend to lose weight with you, or join a weight-loss support group  · Identify foods or triggers that may cause you to overeat , and find ways to avoid them  Remove tempting high-calorie foods from your home and workplace  Place a bowl of fresh fruit on your kitchen counter  If stress causes you to eat, then find other ways to cope with stress  · Keep a diary to track what you eat and drink  Also write down how many minutes of physical activity you do each day  Weigh yourself once a week and record it in your diary  Eating changes: You will need to eat 500 to 1,000 fewer calories each day than you currently eat to lose 1 to 2 pounds a week  The following changes will help you cut calories:  · Eat smaller portions  Use small plates, no larger than 9 inches in diameter  Fill your plate half full of fruits and vegetables  Measure your food using measuring cups until you know what a serving size looks like  · Eat 3 meals and 1 or 2 snacks each day  Plan your meals in advance  [place] and eat at home most of the time  Eat slowly  · Eat fruits and vegetables at every meal   They are low in calories and high in fiber, which makes you feel full  Do not add butter, margarine, or cream sauce to vegetables  Use herbs to season steamed vegetables  · Eat less fat and fewer fried foods  Eat more baked or grilled chicken and fish  These protein sources are lower in calories and fat than red meat  Limit fast food  Dress your salads with olive oil and vinegar instead of bottled dressing  · Limit the amount of sugar you eat  Do not drink sugary beverages  Limit alcohol  Activity changes:  Physical activity is good for your body in many ways   It helps you burn calories and build strong muscles  It decreases stress and depression, and improves your mood  It can also help you sleep better  Talk to your healthcare provider before you begin an exercise program   · Exercise for at least 30 minutes 5 days a week  Start slowly  Set aside time each day for physical activity that you enjoy and that is convenient for you  It is best to do both weight training and an activity that increases your heart rate, such as walking, bicycling, or swimming  · Find ways to be more active  Do yard work and housecleaning  Walk up the stairs instead of using elevators  Spend your leisure time going to events that require walking, such as outdoor festivals or fairs  This extra physical activity can help you lose weight and keep it off  Follow up with your healthcare provider as directed: You may need to meet with a dietitian  Write down your questions so you remember to ask them during your visits  © 2017 [address] Information is for End User's use only and may not be sold, redistributed or otherwise used for commercial purposes  All illustrations and images included in CareNotes® are the copyrighted property of [employer]  or Malachi Santos  The above information is an  only  It is not intended as medical advice for individual conditions or treatments  Talk to your doctor, nurse or pharmacist before following any medical regimen to see if it is safe and effective for you  Urinary Incontinence   WHAT YOU NEED TO KNOW:   What is urinary incontinence? Urinary incontinence (UI) is when you lose control of your bladder  What causes UI? UI occurs because your bladder cannot store or empty urine properly  The following are the most common types of UI:  · Stress incontinence  is when you leak urine due to increased bladder pressure  This may happen when you cough, sneeze, or exercise       · Urge incontinence  is when you feel the need to urinate right away and leak urine accidentally  · Mixed incontinence  is when you have both stress and urge UI  What are the signs and symptoms of UI?   · You feel like your bladder does not empty completely when you urinate  · You urinate often and need to urinate immediately  · You leak urine when you sleep, or you wake up with the urge to urinate  · You leak urine when you cough, sneeze, exercise, or laugh  How is UI diagnosed? Your healthcare provider will ask how often you leak urine and whether you have stress or urge symptoms  Tell him which medicines you take, how often you urinate, and how much liquid you drink each day  You may need any of the following tests:  · Urine tests  may show infection or kidney function  · A pelvic exam  may be done to check for blockages  A pelvic exam will also show if your bladder, uterus, or other organs have moved out of place  · An x-ray, ultrasound, or CT  may show problems with parts of your urinary system  You may be given contrast liquid to help your organs show up better in the pictures  Tell the healthcare provider if you have ever had an allergic reaction to contrast liquid  Do not enter the MRI room with anything metal  Metal can cause serious injury  Tell the healthcare provider if you have any metal in or on your body  · A bladder scan  will show how much urine is left in your bladder after you urinate  You will be asked to urinate and then healthcare providers will use a small ultrasound machine to check the urine left in your bladder  · Cystometry  is used to check the function of your urinary system  Your healthcare provider checks the pressure in your bladder while filling it with fluid  Your bladder pressure may also be tested when your bladder is full and while you urinate  How is UI treated? · Medicines  can help strengthen your bladder control      · Electrical stimulation  is used to send a small amount of electrical energy to your pelvic floor muscles  This helps control your bladder function  Electrodes may be placed outside your body or in your rectum  For women, the electrodes may be placed in the vagina  · A bulking agent  may be injected into the wall of your urethra to make it thicker  This helps keep your urethra closed and decreases urine leakage  · Devices  such as a clamp, pessary, or tampon may help stop urine leaks  Ask your healthcare provider for more information about these and other devices  · Surgery  may be needed if other treatments do not work  Several types of surgery can help improve your bladder control  Ask your healthcare provider for more information about the surgery you may need  How can I manage my symptoms? · Do pelvic muscle exercises often  Your pelvic muscles help you stop urinating  Squeeze these muscles tight for 5 seconds, then relax for 5 seconds  Gradually work up to squeezing for 10 seconds  Do 3 sets of 15 repetitions a day, or as directed  This will help strengthen your pelvic muscles and improve bladder control  · A catheter  may be used to help empty your bladder  A catheter is a tiny, plastic tube that is put into your bladder to drain your urine  Your healthcare provider may tell you to use a catheter to prevent your bladder from getting too full and leaking urine  · Keep a UI record  Write down how often you leak urine and how much you leak  Make a note of what you were doing when you leaked urine  · Train your bladder  Go to the bathroom at set times, such as every 2 hours, even if you do not feel the urge to go  You can also try to hold your urine when you feel the urge to go  For example, hold your urine for 5 minutes when you feel the urge to go  As that becomes easier, hold your urine for 10 minutes  · Drink liquids as directed  Ask your healthcare provider how much liquid to drink each day and which liquids are best for you   You may need to limit the amount of liquid you drink to help control your urine leakage  Limit or do not have drinks that contain caffeine or alcohol  Do not drink any liquid right before you go to bed  · Prevent constipation  Eat a variety of high-fiber foods  Good examples are high-fiber cereals, beans, vegetables, and whole-grain breads  Prune juice may help make your bowel movement softer  Walking is the best way to trigger your intestines to have a bowel movement  · Exercise regularly and maintain a healthy weight  Ask your healthcare provider how much you should weigh and about the best exercise plan for you  Weight loss and exercise will decrease pressure on your bladder and help you control your leakage  Ask him to help you create a weight loss plan if you are overweight  When should I seek immediate care? · You have severe pain  · You are confused or cannot think clearly  When should I contact my healthcare provider? · You have a fever  · You see blood in your urine  · You have pain when you urinate  · You have new or worse pain, even after treatment  · Your mouth feels dry or you have vision changes  · Your urine is cloudy or smells bad  · You have questions or concerns about your condition or care  CARE AGREEMENT:   You have the right to help plan your care  Learn about your health condition and how it may be treated  Discuss treatment options with your caregivers to decide what care you want to receive  You always have the right to refuse treatment  The above information is an  only  It is not intended as medical advice for individual conditions or treatments  Talk to your doctor, nurse or pharmacist before following any medical regimen to see if it is safe and effective for you  © 2017 [address] Information is for End User's use only and may not be sold, redistributed or otherwise used for commercial purposes   All illustrations and images included in CareNotes® are the copyrighted property of [employer] M [employer] Inc  or Malachi Santos  Cigarette Smoking and Your Health   AMBULATORY CARE:   Risks to your health if you smoke:  Nicotine and other chemicals found in tobacco damage every cell in your body  Even if you are a light smoker, you have an increased risk for cancer, heart disease, and lung disease  If you are pregnant or have diabetes, smoking increases your risk for complications  Benefits to your health if you stop smoking:   · You decrease respiratory symptoms such as coughing, wheezing, and shortness of breath  · You reduce your risk for cancers of the lung, mouth, throat, kidney, bladder, pancreas, stomach, and cervix  If you already have cancer, you increase the benefits of chemotherapy  You also reduce your risk for cancer returning or a second cancer from developing  · You reduce your risk for heart disease, blood clots, heart attack, and stroke  · You reduce your risk for lung infections, and diseases such as pneumonia, asthma, chronic bronchitis, and emphysema  · Your circulation improves  More oxygen can be delivered to your body  If you have diabetes, you lower your risk for complications, such as kidney, artery, and eye diseases  You also lower your risk for nerve damage  Nerve damage can lead to amputations, poor vision, and blindness  · You improve your body's ability to heal and to fight infections  Benefits to the health of others if you stop smoking:  Tobacco is harmful to nonsmokers who breathe in your secondhand smoke  The following are ways the health of others around you may improve when you stop smoking:  · You lower the risks for lung cancer and heart disease in nonsmoking adults  · If you are pregnant, you lower the risk for miscarriage, early delivery, low birth weight, and stillbirth  You also lower your baby's risk for SIDS, obesity, developmental delay, and neurobehavioral problems, such as ADHD  · If you have children, you lower their risk for ear infections, colds, pneumonia, bronchitis, and asthma  For more information and support to stop smoking:   · Smokefree  gov  Phone: 2- 058 - 395-8327  Web Address: www smokefree  gov  Follow up with your healthcare provider as directed:  Write down your questions so you remember to ask them during your visits  © 2017 [address] Information is for End User's use only and may not be sold, redistributed or otherwise used for commercial purposes  All illustrations and images included in CareNotes® are the copyrighted property of [employer]  or Malachi Santos  The above information is an  only  It is not intended as medical advice for individual conditions or treatments  Talk to your doctor, nurse or pharmacist before following any medical regimen to see if it is safe and effective for you  Fall Prevention   AMBULATORY CARE:   Fall prevention  includes ways to make your home and other areas safer  It also includes ways you can move more carefully to prevent a fall  Health conditions that cause changes in your blood pressure, vision, or muscle strength and coordination may increase your risk for falls  Medicines may also increase your risk for falls if they make you dizzy, weak, or sleepy  Call 911 or have someone else call if:   · You have fallen and are unconscious  · You have fallen and cannot move part of your body  Contact your healthcare provider if:   · You have fallen and have pain or a headache  · You have questions or concerns about your condition or care  Fall prevention tips:   · Stand or sit up slowly  This may help you keep your balance and prevent falls  · Use assistive devices as directed  Your healthcare provider may suggest that you use a cane or walker to help you keep your balance  You may need to have grab bars put in your bathroom near the toilet or in the shower      · Wear shoes that fit well and have soles that   Wear shoes both inside and outside  Use slippers with good   Do not wear shoes with high heels  · Wear a personal alarm  This is a device that allows you to call 911 if you fall and need help  Ask your healthcare provider for more information  · Stay active  Exercise can help strengthen your muscles and improve your balance  Your healthcare provider may recommend water aerobics or walking  He or she may also recommend physical therapy to improve your coordination  Never start an exercise program without talking to your healthcare provider first      · Manage your medical conditions  Keep all appointments with your healthcare providers  Visit your eye doctor as directed  Home safety tips:   · Add items to prevent falls in the bathroom  Put nonslip strips on your bath or shower floor to prevent you from slipping  Use a bath mat if you do not have carpet in the bathroom  This will prevent you from falling when you step out of the bath or shower  Use a shower seat so you do not need to stand while you shower  Sit on the toilet or a chair in your bathroom to dry yourself and put on clothing  This will prevent you from losing your balance from drying or dressing yourself while you are standing  · Keep paths clear  Remove books, shoes, and other objects from walkways and stairs  Place cords for telephones and lamps out of the way so that you do not need to walk over them  Tape them down if you cannot move them  Remove small rugs  If you cannot remove a rug, secure it with double-sided tape  This will prevent you from tripping  · Install bright lights in your home  Use night lights to help light paths to the bathroom or kitchen  Always turn on the light before you start walking  · Keep items you use often on shelves within reach  Do not use a step stool to help you reach an item  · Paint or place reflective tape on the edges of your stairs    This will help you see the stairs better  Follow up with your healthcare provider as directed:  Write down your questions so you remember to ask them during your visits  © 2017 [address] Information is for End User's use only and may not be sold, redistributed or otherwise used for commercial purposes  All illustrations and images included in CareNotes® are the copyrighted property of [employer]  or Malachi Santos  The above information is an  only  It is not intended as medical advice for individual conditions or treatments  Talk to your doctor, nurse or pharmacist before following any medical regimen to see if it is safe and effective for you  Advance Directives   WHAT YOU NEED TO KNOW:   What are advance directives? Advance directives are legal documents that state your wishes and plans for medical care  These plans are made ahead of time in case you lose your ability to make decisions for yourself  Advance directives can apply to any medical decision, such as the treatments you want, and if you want to donate organs  What are the types of advance directives? There are many types of advance directives, and each state has rules about how to use them  You may choose a combination of any of the following:  · Living will: This is a written record of the treatment you want  You can also choose which treatments you do not want, which to limit, and which to stop at a certain time  This includes surgery, medicine, IV fluid, and tube feedings  · Durable power of  for healthcare [facility]): This is a written record that states who you want to make healthcare choices for you when you are unable to make them for yourself  This person, called a proxy, is usually a family member or a friend  You may choose more than 1 proxy  · Do not resuscitate (DNR) order:  A DNR order is used in case your heart stops beating or you stop breathing   It is a request not to have certain forms of treatment, such as CPR  A DNR order may be included in other types of advance directives  · Medical directive: This covers the care that you want if you are in a coma, near death, or unable to make decisions for yourself  You can list the treatments you want for each condition  Treatment may include pain medicine, surgery, blood transfusions, dialysis, IV or tube feedings, and a ventilator (breathing machine)  · Values history: This document has questions about your views, beliefs, and how you feel and think about life  This information can help others choose the care that you would choose  Why are advance directives important? An advance directive helps you control your care  Although spoken wishes may be used, it is better to have your wishes written down  Spoken wishes can be misunderstood, or not followed  Treatments may be given even if you do not want them  An advance directive may make it easier for your family to make difficult choices about your care  How do I decide what to put in my advance directives? · Make informed decisions:  Make sure you fully understand treatments or care you may receive  Think about the benefits and problems your decisions could cause for you or your family  Talk to healthcare providers if you have concerns or questions before you write down your wishes  You may also want to talk with your Islam or , or a   Check your state laws to make sure that what you put in your advance directive is legal      · Sign all forms:  Sign and date your advance directive when you have finished  You may also need 2 witnesses to sign the forms  Witnesses cannot be your doctor or his staff, your spouse, heirs or beneficiaries, people you owe money to, or your chosen proxy  Talk to your family, proxy, and healthcare providers about your advance directive  Give each person a copy, and keep one for yourself in a place you can get to easily   Do not keep it hidden or locked away  · Review and revise your plans: You can revise your advance directive at any time, as long as you are able to make decisions  Review your plan every year, and when there are changes in your life, or your health  When you make changes, let your family, proxy, and healthcare providers know  Give each a new copy  Where can I find more information? · [employer]  [address] , [place] [address]  Phone: 3- 769 - [phone]  Phone: 6- 417 - [phone]  Web Address: http://www  aafp org  · [address] [facility])  [address] [facility] [facility], [address] [facility] , [address] [place] [address]  Phone: 2- [phone]  Phone: [phone]  Web Address: Krysta kelly  CARE AGREEMENT:   You have the right to help plan your care  To help with this plan, you must learn about your health condition and treatment options  You must also learn about advance directives and how they are used  Work with your healthcare providers to decide what care will be used to treat you  You always have the right to refuse treatment  The above information is an  only  It is not intended as medical advice for individual conditions or treatments  Talk to your doctor, nurse or pharmacist before following any medical regimen to see if it is safe and effective for you  © 2017 [address]  Information is for End User's use only and may not be sold, redistributed or otherwise used for commercial purposes  All illustrations and images included in CareNotes® are the copyrighted property of [employer]  or Malachi Santos  Obesity   AMBULATORY CARE:   Obesity  is when your body mass index (BMI) is greater than 30  Your healthcare provider will use your height and weight to measure your BMI  The risks of obesity include  many health problems, such as injuries or physical disability   You may need tests to check for the following:  · Diabetes     · High blood pressure or high cholesterol     · Heart disease     · Gallbladder or liver disease     · Cancer of the colon, breast, prostate, liver, or kidney     · Sleep apnea     · Arthritis or gout  Seek care immediately if:   · You have a severe headache, confusion, or difficulty speaking  · You have weakness on one side of your body  · You have chest pain, sweating, or shortness of breath  Contact your healthcare provider if:   · You have symptoms of gallbladder or liver disease, such as pain in your upper abdomen  · You have knee or hip pain and discomfort while walking  · You have symptoms of diabetes, such as intense hunger and thirst, and frequent urination  · You have symptoms of sleep apnea, such as snoring or daytime sleepiness  · You have questions or concerns about your condition or care  Treatment for obesity  focuses on helping you lose weight to improve your health  Even a small decrease in BMI can reduce the risk for many health problems  Your healthcare provider will help you set a weight-loss goal   · Lifestyle changes  are the first step in treating obesity  These include making healthy food choices and getting regular physical activity  Your healthcare provider may suggest a weight-loss program that involves coaching, education, and therapy  · Medicine  may help you lose weight when it is used with a healthy diet and physical activity  · Surgery  can help you lose weight if you are very obese and have other health problems  There are several types of weight-loss surgery  Ask your healthcare provider for more information  Be successful losing weight:   · Set small, realistic goals  An example of a small goal is to walk for 20 minutes 5 days a week  Anther goal is to lose 5% of your body weight  · Tell friends, family members, and coworkers about your goals  and ask for their support   Ask a friend to lose weight with you, or join a weight-loss support group  · Identify foods or triggers that may cause you to overeat , and find ways to avoid them  Remove tempting high-calorie foods from your home and workplace  Place a bowl of fresh fruit on your kitchen counter  If stress causes you to eat, then find other ways to cope with stress  · Keep a diary to track what you eat and drink  Also write down how many minutes of physical activity you do each day  Weigh yourself once a week and record it in your diary  Eating changes: You will need to eat 500 to 1,000 fewer calories each day than you currently eat to lose 1 to 2 pounds a week  The following changes will help you cut calories:  · Eat smaller portions  Use small plates, no larger than 9 inches in diameter  Fill your plate half full of fruits and vegetables  Measure your food using measuring cups until you know what a serving size looks like  · Eat 3 meals and 1 or 2 snacks each day  Plan your meals in advance  [email or web address] and eat at home most of the time  Eat slowly  · Eat fruits and vegetables at every meal   They are low in calories and high in fiber, which makes you feel full  Do not add butter, margarine, or cream sauce to vegetables  Use herbs to season steamed vegetables  · Eat less fat and fewer fried foods  Eat more baked or grilled chicken and fish  These protein sources are lower in calories and fat than red meat  Limit fast food  Dress your salads with olive oil and vinegar instead of bottled dressing  · Limit the amount of sugar you eat  Do not drink sugary beverages  Limit alcohol  Activity changes:  Physical activity is good for your body in many ways  It helps you burn calories and build strong muscles  It decreases stress and depression, and improves your mood  It can also help you sleep better  Talk to your healthcare provider before you begin an exercise program   · Exercise for at least 30 minutes 5 days a week  Start slowly   Set aside time each day for physical activity that you enjoy and that is convenient for you  It is best to do both weight training and an activity that increases your heart rate, such as walking, bicycling, or swimming  · Find ways to be more active  Do yard work and housecleaning  Walk up the stairs instead of using elevators  Spend your leisure time going to events that require walking, such as outdoor festivals or fairs  This extra physical activity can help you lose weight and keep it off  Follow up with your healthcare provider as directed: You may need to meet with a dietitian  Write down your questions so you remember to ask them during your visits  © 2017 [address] Information is for End User's use only and may not be sold, redistributed or otherwise used for commercial purposes  All illustrations and images included in CareNotes® are the copyrighted property of [employer]  or Malachi Santos  The above information is an  only  It is not intended as medical advice for individual conditions or treatments  Talk to your doctor, nurse or pharmacist before following any medical regimen to see if it is safe and effective for you  Urinary Incontinence   WHAT YOU NEED TO KNOW:   What is urinary incontinence? Urinary incontinence (UI) is when you lose control of your bladder  What causes UI? UI occurs because your bladder cannot store or empty urine properly  The following are the most common types of UI:  · Stress incontinence  is when you leak urine due to increased bladder pressure  This may happen when you cough, sneeze, or exercise  · Urge incontinence  is when you feel the need to urinate right away and leak urine accidentally  · Mixed incontinence  is when you have both stress and urge UI  What are the signs and symptoms of UI?   · You feel like your bladder does not empty completely when you urinate  · You urinate often and need to urinate immediately      · You leak urine when you sleep, or you wake up with the urge to urinate  · You leak urine when you cough, sneeze, exercise, or laugh  How is UI diagnosed? Your healthcare provider will ask how often you leak urine and whether you have stress or urge symptoms  Tell him which medicines you take, how often you urinate, and how much liquid you drink each day  You may need any of the following tests:  · Urine tests  may show infection or kidney function  · A pelvic exam  may be done to check for blockages  A pelvic exam will also show if your bladder, uterus, or other organs have moved out of place  · An x-ray, ultrasound, or CT  may show problems with parts of your urinary system  You may be given contrast liquid to help your organs show up better in the pictures  Tell the healthcare provider if you have ever had an allergic reaction to contrast liquid  Do not enter the MRI room with anything metal  Metal can cause serious injury  Tell the healthcare provider if you have any metal in or on your body  · A bladder scan  will show how much urine is left in your bladder after you urinate  You will be asked to urinate and then healthcare providers will use a small ultrasound machine to check the urine left in your bladder  · Cystometry  is used to check the function of your urinary system  Your healthcare provider checks the pressure in your bladder while filling it with fluid  Your bladder pressure may also be tested when your bladder is full and while you urinate  How is UI treated? · Medicines  can help strengthen your bladder control  · Electrical stimulation  is used to send a small amount of electrical energy to your pelvic floor muscles  This helps control your bladder function  Electrodes may be placed outside your body or in your rectum  For women, the electrodes may be placed in the vagina  · A bulking agent  may be injected into the wall of your urethra to make it thicker   This helps keep your urethra closed and decreases urine leakage  · Devices  such as a clamp, pessary, or tampon may help stop urine leaks  Ask your healthcare provider for more information about these and other devices  · Surgery  may be needed if other treatments do not work  Several types of surgery can help improve your bladder control  Ask your healthcare provider for more information about the surgery you may need  How can I manage my symptoms? · Do pelvic muscle exercises often  Your pelvic muscles help you stop urinating  Squeeze these muscles tight for 5 seconds, then relax for 5 seconds  Gradually work up to squeezing for 10 seconds  Do 3 sets of 15 repetitions a day, or as directed  This will help strengthen your pelvic muscles and improve bladder control  · A catheter  may be used to help empty your bladder  A catheter is a tiny, plastic tube that is put into your bladder to drain your urine  Your healthcare provider may tell you to use a catheter to prevent your bladder from getting too full and leaking urine  · Keep a UI record  Write down how often you leak urine and how much you leak  Make a note of what you were doing when you leaked urine  · Train your bladder  Go to the bathroom at set times, such as every 2 hours, even if you do not feel the urge to go  You can also try to hold your urine when you feel the urge to go  For example, hold your urine for 5 minutes when you feel the urge to go  As that becomes easier, hold your urine for 10 minutes  · Drink liquids as directed  Ask your healthcare provider how much liquid to drink each day and which liquids are best for you  You may need to limit the amount of liquid you drink to help control your urine leakage  Limit or do not have drinks that contain caffeine or alcohol  Do not drink any liquid right before you go to bed  · Prevent constipation  Eat a variety of high-fiber foods   Good examples are high-fiber cereals, beans, vegetables, and whole-grain breads  Prune juice may help make your bowel movement softer  Walking is the best way to trigger your intestines to have a bowel movement  · Exercise regularly and maintain a healthy weight  Ask your healthcare provider how much you should weigh and about the best exercise plan for you  Weight loss and exercise will decrease pressure on your bladder and help you control your leakage  Ask him to help you create a weight loss plan if you are overweight  When should I seek immediate care? · You have severe pain  · You are confused or cannot think clearly  When should I contact my healthcare provider? · You have a fever  · You see blood in your urine  · You have pain when you urinate  · You have new or worse pain, even after treatment  · Your mouth feels dry or you have vision changes  · Your urine is cloudy or smells bad  · You have questions or concerns about your condition or care  CARE AGREEMENT:   You have the right to help plan your care  Learn about your health condition and how it may be treated  Discuss treatment options with your caregivers to decide what care you want to receive  You always have the right to refuse treatment  The above information is an  only  It is not intended as medical advice for individual conditions or treatments  Talk to your doctor, nurse or pharmacist before following any medical regimen to see if it is safe and effective for you  © 2017 [address]  Information is for End User's use only and may not be sold, redistributed or otherwise used for commercial purposes  All illustrations and images included in CareNotes® are the copyrighted property of A D A M , Inc  or Malachi Danielle  Cigarette Smoking and Your Health   AMBULATORY CARE:   Risks to your health if you smoke:  Nicotine and other chemicals found in tobacco damage every cell in your body   Even if you are a light smoker, you have an increased risk for cancer, heart disease, and lung disease  If you are pregnant or have diabetes, smoking increases your risk for complications  Benefits to your health if you stop smoking:   · You decrease respiratory symptoms such as coughing, wheezing, and shortness of breath  · You reduce your risk for cancers of the lung, mouth, throat, kidney, bladder, pancreas, stomach, and cervix  If you already have cancer, you increase the benefits of chemotherapy  You also reduce your risk for cancer returning or a second cancer from developing  · You reduce your risk for heart disease, blood clots, heart attack, and stroke  · You reduce your risk for lung infections, and diseases such as pneumonia, asthma, chronic bronchitis, and emphysema  · Your circulation improves  More oxygen can be delivered to your body  If you have diabetes, you lower your risk for complications, such as kidney, artery, and eye diseases  You also lower your risk for nerve damage  Nerve damage can lead to amputations, poor vision, and blindness  · You improve your body's ability to heal and to fight infections  Benefits to the health of others if you stop smoking:  Tobacco is harmful to nonsmokers who breathe in your secondhand smoke  The following are ways the health of others around you may improve when you stop smoking:  · You lower the risks for lung cancer and heart disease in nonsmoking adults  · If you are pregnant, you lower the risk for miscarriage, early delivery, low birth weight, and stillbirth  You also lower your baby's risk for SIDS, obesity, developmental delay, and neurobehavioral problems, such as ADHD  · If you have children, you lower their risk for ear infections, colds, pneumonia, bronchitis, and asthma  For more information and support to stop smoking:   · [facility]  Phone: 5- 616 - [phone]  Web Address: www Ecelles Carson  Follow up with your healthcare provider as directed: Write down your questions so you remember to ask them during your visits  © 2017 [address] Information is for End User's use only and may not be sold, redistributed or otherwise used for commercial purposes  All illustrations and images included in CareNotes® are the copyrighted property of A D [employer] M [employer] Inc  or Malachi Santos  The above information is an  only  It is not intended as medical advice for individual conditions or treatments  Talk to your doctor, nurse or pharmacist before following any medical regimen to see if it is safe and effective for you  Fall Prevention   AMBULATORY CARE:   Fall prevention  includes ways to make your home and other areas safer  It also includes ways you can move more carefully to prevent a fall  Health conditions that cause changes in your blood pressure, vision, or muscle strength and coordination may increase your risk for falls  Medicines may also increase your risk for falls if they make you dizzy, weak, or sleepy  Call 911 or have someone else call if:   · You have fallen and are unconscious  · You have fallen and cannot move part of your body  Contact your healthcare provider if:   · You have fallen and have pain or a headache  · You have questions or concerns about your condition or care  Fall prevention tips:   · Stand or sit up slowly  This may help you keep your balance and prevent falls  · Use assistive devices as directed  Your healthcare provider may suggest that you use a cane or walker to help you keep your balance  You may need to have grab bars put in your bathroom near the toilet or in the shower  · Wear shoes that fit well and have soles that   Wear shoes both inside and outside  Use slippers with good   Do not wear shoes with high heels  · Wear a personal alarm  This is a device that allows you to call 911 if you fall and need help   Ask your healthcare provider for more information  · Stay active  Exercise can help strengthen your muscles and improve your balance  Your healthcare provider may recommend water aerobics or walking  He or she may also recommend physical therapy to improve your coordination  Never start an exercise program without talking to your healthcare provider first      · Manage your medical conditions  Keep all appointments with your healthcare providers  Visit your eye doctor as directed  Home safety tips:   · Add items to prevent falls in the bathroom  Put nonslip strips on your bath or shower floor to prevent you from slipping  Use a bath mat if you do not have carpet in the bathroom  This will prevent you from falling when you step out of the bath or shower  Use a shower seat so you do not need to stand while you shower  Sit on the toilet or a chair in your bathroom to dry yourself and put on clothing  This will prevent you from losing your balance from drying or dressing yourself while you are standing  · Keep paths clear  Remove books, shoes, and other objects from walkways and stairs  Place cords for telephones and lamps out of the way so that you do not need to walk over them  Tape them down if you cannot move them  Remove small rugs  If you cannot remove a rug, secure it with double-sided tape  This will prevent you from tripping  · Install bright lights in your home  Use night lights to help light paths to the bathroom or kitchen  Always turn on the light before you start walking  · Keep items you use often on shelves within reach  Do not use a step stool to help you reach an item  · Paint or place reflective tape on the edges of your stairs  This will help you see the stairs better  Follow up with your healthcare provider as directed:  Write down your questions so you remember to ask them during your visits     © 2017 [employer] Information is for End User's use only and may not be sold, redistributed or otherwise used for commercial purposes  All illustrations and images included in CareNotes® are the copyrighted property of A D [employer] M [employer] Inc  or Malachi Santos  The above information is an  only  It is not intended as medical advice for individual conditions or treatments  Talk to your doctor, nurse or pharmacist before following any medical regimen to see if it is safe and effective for you  Advance Directives   WHAT YOU NEED TO KNOW:   What are advance directives? Advance directives are legal documents that state your wishes and plans for medical care  These plans are made ahead of time in case you lose your ability to make decisions for yourself  Advance directives can apply to any medical decision, such as the treatments you want, and if you want to donate organs  What are the types of advance directives? There are many types of advance directives, and each state has rules about how to use them  You may choose a combination of any of the following:  · Living will: This is a written record of the treatment you want  You can also choose which treatments you do not want, which to limit, and which to stop at a certain time  This includes surgery, medicine, IV fluid, and tube feedings  · Durable power of  for healthcare [facility]): This is a written record that states who you want to make healthcare choices for you when you are unable to make them for yourself  This person, called a proxy, is usually a family member or a friend  You may choose more than 1 proxy  · Do not resuscitate (DNR) order:  A DNR order is used in case your heart stops beating or you stop breathing  It is a request not to have certain forms of treatment, such as CPR  A DNR order may be included in other types of advance directives  · Medical directive: This covers the care that you want if you are in a coma, near death, or unable to make decisions for yourself   You can list the treatments you want for each condition  Treatment may include pain medicine, surgery, blood transfusions, dialysis, IV or tube feedings, and a ventilator (breathing machine)  · Values history: This document has questions about your views, beliefs, and how you feel and think about life  This information can help others choose the care that you would choose  Why are advance directives important? An advance directive helps you control your care  Although spoken wishes may be used, it is better to have your wishes written down  Spoken wishes can be misunderstood, or not followed  Treatments may be given even if you do not want them  An advance directive may make it easier for your family to make difficult choices about your care  How do I decide what to put in my advance directives? · Make informed decisions:  Make sure you fully understand treatments or care you may receive  Think about the benefits and problems your decisions could cause for you or your family  Talk to healthcare providers if you have concerns or questions before you write down your wishes  You may also want to talk with your Protestant or , or a   Check your state laws to make sure that what you put in your advance directive is legal      · Sign all forms:  Sign and date your advance directive when you have finished  You may also need 2 witnesses to sign the forms  Witnesses cannot be your doctor or his staff, your spouse, heirs or beneficiaries, people you owe money to, or your chosen proxy  Talk to your family, proxy, and healthcare providers about your advance directive  Give each person a copy, and keep one for yourself in a place you can get to easily  Do not keep it hidden or locked away  · Review and revise your plans: You can revise your advance directive at any time, as long as you are able to make decisions  Review your plan every year, and when there are changes in your life, or your health   When you make changes, let your family, proxy, and healthcare providers know  Give each a new copy  Where can I find more information? · [employer]  [address] , [place] [address]  Phone: 4- 610 [phone]  Phone: 4- 655 - [phone]  Web Address: http://www  aafp org  · [address] [facility])  [address], [address] [facility] , [address] [place] [address]  Phone: [phone]  Phone: [phone]  Web Address: Jadonyani robin  CARE AGREEMENT:   You have the right to help plan your care  To help with this plan, you must learn about your health condition and treatment options  You must also learn about advance directives and how they are used  Work with your healthcare providers to decide what care will be used to treat you  You always have the right to refuse treatment  The above information is an  only  It is not intended as medical advice for individual conditions or treatments  Talk to your doctor, nurse or pharmacist before following any medical regimen to see if it is safe and effective for you  © 2017 [address] [employer] Information is for End User's use only and may not be sold, redistributed or otherwise used for commercial purposes  All illustrations and images included in CareNotes® are the copyrighted property of A D A M , Inc  or Malachi Santos  Obesity   AMBULATORY CARE:   Obesity  is when your body mass index (BMI) is greater than 30  Your healthcare provider will use your height and weight to measure your BMI  The risks of obesity include  many health problems, such as injuries or physical disability   You may need tests to check for the following:  · Diabetes     · High blood pressure or high cholesterol     · Heart disease     · Gallbladder or liver disease     · Cancer of the colon, breast, prostate, liver, or kidney     · Sleep apnea     · Arthritis or gout  Seek care immediately if:   · You have a severe headache, confusion, or difficulty speaking  · You have weakness on one side of your body  · You have chest pain, sweating, or shortness of breath  Contact your healthcare provider if:   · You have symptoms of gallbladder or liver disease, such as pain in your upper abdomen  · You have knee or hip pain and discomfort while walking  · You have symptoms of diabetes, such as intense hunger and thirst, and frequent urination  · You have symptoms of sleep apnea, such as snoring or daytime sleepiness  · You have questions or concerns about your condition or care  Treatment for obesity  focuses on helping you lose weight to improve your health  Even a small decrease in BMI can reduce the risk for many health problems  Your healthcare provider will help you set a weight-loss goal   · Lifestyle changes  are the first step in treating obesity  These include making healthy food choices and getting regular physical activity  Your healthcare provider may suggest a weight-loss program that involves coaching, education, and therapy  · Medicine  may help you lose weight when it is used with a healthy diet and physical activity  · Surgery  can help you lose weight if you are very obese and have other health problems  There are several types of weight-loss surgery  Ask your healthcare provider for more information  Be successful losing weight:   · Set small, realistic goals  An example of a small goal is to walk for 20 minutes 5 days a week  Anther goal is to lose 5% of your body weight  · Tell friends, family members, and coworkers about your goals  and ask for their support  Ask a friend to lose weight with you, or join a weight-loss support group  · Identify foods or triggers that may cause you to overeat , and find ways to avoid them  Remove tempting high-calorie foods from your home and workplace  Place a bowl of fresh fruit on your kitchen counter   If stress causes you to eat, then find other ways to cope with stress  · Keep a diary to track what you eat and drink  Also write down how many minutes of physical activity you do each day  Weigh yourself once a week and record it in your diary  Eating changes: You will need to eat 500 to 1,000 fewer calories each day than you currently eat to lose 1 to 2 pounds a week  The following changes will help you cut calories:  · Eat smaller portions  Use small plates, no larger than 9 inches in diameter  Fill your plate half full of fruits and vegetables  Measure your food using measuring cups until you know what a serving size looks like  · Eat 3 meals and 1 or 2 snacks each day  Plan your meals in advance  [place] and eat at home most of the time  Eat slowly  · Eat fruits and vegetables at every meal   They are low in calories and high in fiber, which makes you feel full  Do not add butter, margarine, or cream sauce to vegetables  Use herbs to season steamed vegetables  · Eat less fat and fewer fried foods  Eat more baked or grilled chicken and fish  These protein sources are lower in calories and fat than red meat  Limit fast food  Dress your salads with olive oil and vinegar instead of bottled dressing  · Limit the amount of sugar you eat  Do not drink sugary beverages  Limit alcohol  Activity changes:  Physical activity is good for your body in many ways  It helps you burn calories and build strong muscles  It decreases stress and depression, and improves your mood  It can also help you sleep better  Talk to your healthcare provider before you begin an exercise program   · Exercise for at least 30 minutes 5 days a week  Start slowly  Set aside time each day for physical activity that you enjoy and that is convenient for you  It is best to do both weight training and an activity that increases your heart rate, such as walking, bicycling, or swimming  · Find ways to be more active  Do yard work and housecleaning   Walk up the stairs instead of using elevators  Spend your leisure time going to events that require walking, such as outdoor festivals or fairs  This extra physical activity can help you lose weight and keep it off  Follow up with your healthcare provider as directed: You may need to meet with a dietitian  Write down your questions so you remember to ask them during your visits  © 2017 [address] Information is for End User's use only and may not be sold, redistributed or otherwise used for commercial purposes  All illustrations and images included in CareNotes® are the copyrighted property of [employer]  or Malachi Santos  The above information is an  only  It is not intended as medical advice for individual conditions or treatments  Talk to your doctor, nurse or pharmacist before following any medical regimen to see if it is safe and effective for you  Low Fat Diet   AMBULATORY CARE:   A low-fat diet  is an eating plan that is low in total fat, unhealthy fat, and cholesterol  You may need to follow a low-fat diet if you have trouble digesting or absorbing fat  You may also need to follow this diet if you have high cholesterol  You can also lower your cholesterol by increasing the amount of fiber in your diet  Soluble fiber is a type of fiber that helps to decrease cholesterol levels  Different types of fat in food:   · Limit unhealthy fats  A diet that is high in cholesterol, saturated fat, and trans fat may cause unhealthy cholesterol levels  Unhealthy cholesterol levels increase your risk of heart disease  ¨ Cholesterol:  Limit intake of cholesterol to less than 200 mg per day  Cholesterol is found in meat, eggs, and dairy  ¨ Saturated fat:  Limit saturated fat to less than 7% of your total daily calories  Ask your dietitian how many calories you need each day  Saturated fat is found in butter, cheese, ice cream, whole milk, and palm oil   Saturated fat is also found in meat, such as beef, pork, chicken skin, and processed meats  Processed meats include sausage, hot dogs, and bologna  ¨ Trans fat:  Avoid trans fat as much as possible  Trans fat is used in fried and baked foods  Foods that say trans fat free on the label may still have up to 0 5 grams of trans fat per serving  · Include healthy fats  Replace foods that are high in saturated and trans fat with foods high in healthy fats  This may help to decrease high cholesterol levels  ¨ Monounsaturated fats: These are found in avocados, nuts, and vegetable oils, such as olive, canola, and sunflower oil  ¨ Polyunsaturated fats: These can be found in vegetable oils, such as soybean or corn oil  Omega-3 fats can help to decrease the risk of heart disease  Omega-3 fats are found in fish, such as salmon, herring, trout, and tuna  Omega-3 fats can also be found in plant foods, such as walnuts, flaxseed, soybeans, and canola oil    Foods to limit or avoid:   · Grains:      ¨ Snacks that are made with partially hydrogenated oils, such as chips, regular crackers, and butter-flavored popcorn    ¨ High-fat baked goods, such as biscuits, croissants, doughnuts, pies, cookies, and pastries    · Dairy:      ¨ Whole milk, 2% milk, and yogurt and ice cream made with whole milk    ¨ Half and half creamer, heavy cream, and whipping cream    ¨ Cheese, cream cheese, and sour cream    · Meats and proteins:      ¨ High-fat cuts of meat (T-bone steak, regular hamburger, and ribs)    ¨ Fried meat, poultry (turkey and chicken), and fish    ¨ Poultry (chicken and turkey) with skin    ¨ Cold cuts (salami or bologna), hot dogs, glasgow, and sausage    ¨ Whole eggs and egg yolks    · Vegetables and fruits with added fat:      ¨ Fried vegetables or vegetables in butter or high-fat sauces, such as cream or cheese sauces    ¨ Fried fruit or fruit served with butter or cream    · Fats:      ¨ Butter, stick margarine, and shortening    ¨ Coconut, palm oil, and palm kernel oil  Foods to include:   · Grains:      ¨ Whole-grain breads, cereals, pasta, and brown rice    ¨ Low-fat crackers and pretzels    · Vegetables and fruits:      ¨ Fresh, frozen, or canned vegetables (no salt or low-sodium)    ¨ Fresh, frozen, dried, or canned fruit (canned in light syrup or fruit juice)    ¨ Avocado    · Low-fat dairy products:      ¨ Nonfat (skim) or 1% milk    ¨ Nonfat or low-fat cheese, yogurt, and cottage cheese    · Meats and proteins:      ¨ Chicken or turkey with no skin    ¨ Baked or broiled fish    ¨ Lean beef and pork (loin, round, extra lean hamburger)    ¨ Beans and peas, unsalted nuts, soy products    ¨ Egg whites and substitutes    ¨ Seeds and nuts    · Fats:      ¨ Unsaturated oil, such as canola, olive, peanut, soybean, or sunflower oil    ¨ Soft or liquid margarine and vegetable oil spread    ¨ Low-fat salad dressing  Other ways to decrease fat:   · Read food labels before you buy foods  Choose foods that have less than 30% of calories from fat  Choose low-fat or fat-free dairy products  Remember that fat free does not mean calorie free  These foods still contain calories, and too many calories can lead to weight gain  · Trim fat from meat and avoid fried food  Trim all visible fat from meat before you cook it  Remove the skin from poultry  Do not haas meat, fish, or poultry  Bake, roast, boil, or broil these foods instead  Avoid fried foods  Eat a baked potato instead of Western Izzy fries  Steam vegetables instead of sautéing them in butter  · Add less fat to foods  Use imitation glasgow bits on salads and baked potatoes instead of regular glasgow bits  Use fat-free or low-fat salad dressings instead of regular dressings  Use low-fat or nonfat butter-flavored topping instead of regular butter or margarine on popcorn and other foods  Ways to decrease fat in recipes:  Replace high-fat ingredients with low-fat or nonfat ones   This may cause baked goods to be drier than usual  You may need to use nonfat cooking spray on pans to prevent food from sticking  You also may need to change the amount of other ingredients, such as water, in the recipe  Try the following:  · Use low-fat or light margarine instead of regular margarine or shortening  · Use lean ground turkey breast or chicken, or lean ground beef (less than 5% fat) instead of hamburger  · Add 1 teaspoon of canola oil to 8 ounces of skim milk instead of using cream or half and half  · Use grated zucchini, carrots, or apples in breads instead of coconut  · Use blenderized, low-fat cottage cheese, plain tofu, or low-fat ricotta cheese instead of cream cheese  · Use 1 egg white and 1 teaspoon of canola oil, or use ¼ cup (2 ounces) of fat-free egg substitute instead of a whole egg  · Replace half of the oil that is called for in a recipe with applesauce when you bake  Use 3 tablespoons of cocoa powder and 1 tablespoon of canola oil instead of a square of baking chocolate  How to increase fiber:  Eat enough high-fiber foods to get 20 to 30 grams of fiber every day  Slowly increase your fiber intake to avoid stomach cramps, gas, and other problems  · Eat 3 ounces of whole-grain foods each day  An ounce is about 1 slice of bread  Eat whole-grain breads, such as whole-wheat bread  Whole wheat, whole-wheat flour, or other whole grains should be listed as the first ingredient on the food label  Replace white flour with whole-grain flour or use half of each in recipes  Whole-grain flour is heavier than white flour, so you may have to add more yeast or baking powder  · Eat a high-fiber cereal for breakfast   Oatmeal is a good source of soluble fiber  Look for cereals that have bran or fiber in the name  Choose whole-grain products, such as brown rice, barley, and whole-wheat pasta  · Eat more beans, peas, and lentils  For example, add beans to soups or salads   Eat at least 5 cups of fruits and vegetables each day  Eat fruits and vegetables with the peel because the peel is high in fiber  © 2017 [address]  Information is for End User's use only and may not be sold, redistributed or otherwise used for commercial purposes  All illustrations and images included in CareNotes® are the copyrighted property of [employer]  or Malachi Santos  The above information is an  only  It is not intended as medical advice for individual conditions or treatments  Talk to your doctor, nurse or pharmacist before following any medical regimen to see if it is safe and effective for you  Heart Healthy Diet   AMBULATORY CARE:   A heart healthy diet  is an eating plan low in total fat, unhealthy fats, and sodium (salt)  A heart healthy diet helps decrease your risk for heart disease and stroke  Limit the amount of fat you eat to 25% to 35% of your total daily calories  Limit sodium to less than 2,300 mg each day  Healthy fats:  Healthy fats can help improve cholesterol levels  The risk for heart disease is decreased when cholesterol levels are normal  Choose healthy fats, such as the following:  · Unsaturated fat  is found in foods such as soybean, canola, olive, corn, and safflower oils  It is also found in soft tub margarine that is made with liquid vegetable oil  · Omega-3 fat  is found in certain fish, such as salmon, tuna, and trout, and in walnuts and flaxseed  Unhealthy fats:  Unhealthy fats can cause unhealthy cholesterol levels in your blood and increase your risk of heart disease  Limit unhealthy fats, such as the following:  · Cholesterol  is found in animal foods, such as eggs and lobster, and in dairy products made from whole milk  Limit cholesterol to less than 300 milligrams (mg) each day  You may need to limit cholesterol to 200 mg each day if you have heart disease       · Saturated fat  is found in meats, such as glasgow and hamburger  It is also found in chicken or turkey skin, whole milk, and butter  Limit saturated fat to less than 7% of your total daily calories  Limit saturated fat to less than 6% if you have heart disease or are at increased risk for it  · Trans fat  is found in packaged foods, such as potato chips and cookies  It is also in hard margarine, some fried foods, and shortening  Avoid trans fats as much as possible    Heart healthy foods and drinks to include:  Ask your dietitian or healthcare provider how many servings to have from each of the following food groups:  · Grains:      ¨ Whole-wheat breads, cereals, and pastas, and brown rice    ¨ Low-fat, low-sodium crackers and chips    · Vegetables:      ¨ Broccoli, green beans, green peas, and spinach    ¨ Collards, kale, and lima beans    ¨ Carrots, sweet potatoes, tomatoes, and peppers    ¨ Canned vegetables with no salt added    · Fruits:      ¨ Bananas, peaches, pears, and pineapple    ¨ Grapes, raisins, and dates    ¨ Oranges, tangerines, grapefruit, orange juice, and grapefruit juice    ¨ Apricots, mangoes, melons, and papaya    ¨ Raspberries and strawberries    ¨ Canned fruit with no added sugar    · Low-fat dairy products:      ¨ Nonfat (skim) milk, 1% milk, and low-fat almond, cashew, or soy milks fortified with calcium    ¨ Low-fat cheese, regular or frozen yogurt, and cottage cheese    · Meats and proteins , such as lean cuts of beef and pork (loin, leg, round), skinless chicken and turkey, legumes, soy products, egg whites, and nuts  Foods and drinks to limit or avoid:  Ask your dietitian or healthcare provider about these and other foods that are high in unhealthy fat, sodium, and sugar:  · Snack or packaged foods , such as frozen dinners, cookies, macaroni and cheese, and cereals with more than 300 mg of sodium per serving    · Canned or dry mixes  for cakes, soups, sauces, or gravies    · Vegetables with added sodium , such as instant potatoes, vegetables with added sauces, or regular canned vegetables    · Other foods high in sodium , such as ketchup, barbecue sauce, salad dressing, pickles, olives, soy sauce, and miso    · High-fat dairy foods  such as whole or 2% milk, cream cheese, or sour cream, and cheeses     · High-fat protein foods  such as high-fat cuts of beef (T-bone steaks, ribs), chicken or turkey with skin, and organ meats, such as liver    · Cured or smoked meats , such as hot dogs, glasgow, and sausage    · Unhealthy fats and oils , such as butter, stick margarine, shortening, and cooking oils such as coconut or palm oil    · Food and drinks high in sugar , such as soft drinks (soda), sports drinks, sweetened tea, candy, cake, cookies, pies, and doughnuts  Other diet guidelines to follow:   · Eat more foods containing omega-3 fats  Eat fish high in omega-3 fats at least 2 times a week  · Limit alcohol  Too much alcohol can damage your heart and raise your blood pressure  Women should limit alcohol to 1 drink a day  Men should limit alcohol to 2 drinks a day  A drink of alcohol is 12 ounces of beer, 5 ounces of wine, or 1½ ounces of liquor  · Choose low-sodium foods  High-sodium foods can lead to high blood pressure  Add little or no salt to food you prepare  Use herbs and spices in place of salt  · Eat more fiber  to help lower cholesterol levels  Eat at least 5 servings of fruits and vegetables each day  Eat 3 ounces of whole-grain foods each day  Legumes (beans) are also a good source of fiber  Lifestyle guidelines:   · Do not smoke  Nicotine and other chemicals in cigarettes and cigars can cause lung and heart damage  Ask your healthcare provider for information if you currently smoke and need help to quit  E-cigarettes or smokeless tobacco still contain nicotine  Talk to your healthcare provider before you use these products       · Exercise regularly  to help you maintain a healthy weight and improve your blood pressure and cholesterol levels  Ask your healthcare provider about the best exercise plan for you  Do not start an exercise program without asking your healthcare provider  Follow up with your healthcare provider as directed:  Write down your questions so you remember to ask them during your visits  © 2017 [address] [employer] Information is for End User's use only and may not be sold, redistributed or otherwise used for commercial purposes  All illustrations and images included in CareNotes® are the copyrighted property of [employer]  or Malachi Santos  The above information is an  only  It is not intended as medical advice for individual conditions or treatments  Talk to your doctor, nurse or pharmacist before following any medical regimen to see if it is safe and effective for you  Calorie Counting Diet   WHAT YOU NEED TO KNOW:   What is a calorie counting diet? It is a meal plan based on counting calories each day to reach a healthy body weight  You will need to eat fewer calories if you are trying to lose weight  Weight loss may decrease your risk for certain health problems or improve your health if you have health problems  Some of these health problems include heart disease, high blood pressure, and diabetes  What foods should I avoid? Your dietitian will tell you if you need to avoid certain foods based on your body weight and health condition  You may need to avoid high-fat foods if you are at risk for or have heart disease  You may need to eat fewer foods from the breads and starches food group if you have diabetes  How many calories are in foods? The following is a list of foods and drinks with the approximate number of calories in each  Check the food label to find the exact number of calories  A dietitian can tell you how many calories you should have from each food group each day    · Carbohydrate:      ¨ ½ of a 3-inch bagel, 1 slice of bread, or ½ of a hamburger bun or hot dog bun (80)    ¨ 1 (8-inch) flour tortilla or ½ cup of cooked rice (100)    ¨ 1 (6-inch) corn tortilla (80)    ¨ 1 (6-inch) pancake or 1 cup of bran flakes cereal (110)    ¨ ½ cup of cooked cereal (80)    ¨ ½ cup of cooked pasta (85)    ¨ 1 ounce of pretzels (100)    ¨ 3 cups of air-popped popcorn without butter or oil (80)    · Dairy:      ¨ 1 cup of skim or 1% milk (90)    ¨ 1 cup of 2% milk (120)    ¨ 1 cup of whole milk (160)    ¨ 1 cup of 2% chocolate milk (220)    ¨ 1 ounce of low-fat cheese with 3 grams of fat per ounce (70)    ¨ 1 ounce of cheddar cheese (114)    ¨ ½ cup of 1% fat cottage cheese (80)    ¨ 1 cup of plain or sugar-free, fat-free yogurt (90)    · Protein foods:      ¨ 3 ounces of fish (not breaded or fried) (95)    ¨ 3 ounces of breaded, fried fish (195)    ¨ ¾ cup of tuna canned in water (105)    ¨ 3 ounces of chicken breast without skin (105)    ¨ 1 fried chicken breast with skin (350)    ¨ ¼ cup of fat free egg substitute (40)    ¨ 1 large egg (75)    ¨ 3 ounces of lean beef or pork (165)    ¨ 3 ounces of fried pork chop or ham (185)    ¨ ½ cup of cooked dried beans, such as kidney, escamilla, lentils, or navy (115)    ¨ 3 ounces of bologna or lunch meat (225)    ¨ 2 links of breakfast sausage (140)    · Vegetables:      ¨ ½ cup of sliced mushrooms (10)    ¨ 1 cup of salad greens, such as lettuce, spinach, or olimpia (15)    ¨ ½ cup of steamed asparagus (20)    ¨ ½ cup of cooked summer squash, zucchini squash, or green or wax beans (25)    ¨ 1 cup of broccoli or cauliflower florets, or 1 medium tomato (25)    ¨ 1 large raw carrot or ½ cup of cooked carrots (40)    ¨ ? of a medium cucumber or 1 stalk of celery (5)    ¨ 1 small baked potato (160)    ¨ 1 cup of breaded, fried vegetables (230)    · Fruit:      ¨ 1 (6-inch) banana (55)     ¨ ½ of a 4-inch grapefruit (55)    ¨ 15 grapes (60)    ¨ 1 medium orange or apple (70)    ¨ 1 large peach (65)    ¨ 1 cup of fresh pineapple chunks (75)    ¨ 1 cup of melon cubes (50)    ¨ 1¼ cups of whole strawberries (45)    ¨ ½ cup of fruit canned in juice (55)    ¨ ½ cup of fruit canned in heavy syrup (110)    ¨ ?  cup of raisins (130)    ¨ ½ cup of unsweetened fruit juice (60)    ¨ ½ cup of grape, cranberry, or prune juice (90)    · Fat:      ¨ 10 peanuts or 2 teaspoons of peanut butter (55)    ¨ 2 tablespoons of avocado or 1 tablespoon of regular salad dressing (45)    ¨ 2 slices of glasgow (90)    ¨ 1 teaspoon of oil, such as safflower, canola, corn, or olive oil (45)    ¨ 2 teaspoons of low-fat margarine, or 1 tablespoon of low-fat mayonnaise (50)    ¨ 1 teaspoon of regular margarine (40)    ¨ 1 tablespoon of regular mayonnaise (135)    ¨ 1 tablespoon of cream cheese or 2 tablespoons of low-fat cream cheese (45)    ¨ 2 tablespoons of vegetable shortening (215)    · Dessert and sweets:      ¨ 8 animal crackers or 5 vanilla wafers (80)    ¨ 1 frozen fruit juice bar (80)    ¨ ½ cup of ice milk or low-fat frozen yogurt (90)    ¨ ½ cup of sherbet or sorbet (125)    ¨ ½ cup of sugar-free pudding or custard (60)    ¨ ½ cup of ice cream (140)    ¨ ½ cup of pudding or custard (175)    ¨ 1 (2-inch) square chocolate brownie (185)    · Combination foods:      ¨ Bean burrito made with an 8-inch tortilla, without cheese (275)    ¨ Chicken breast sandwich with lettuce and tomato (325)    ¨ 1 cup of chicken noodle soup (60)    ¨ 1 beef taco (175)    ¨ Regular hamburger with lettuce and tomato (310)    ¨ Regular cheeseburger with lettuce and tomato (410)     ¨ ¼ of a 12-inch cheese pizza (280)    ¨ Fried fish sandwich with lettuce and tomato (425)    ¨ Hot dog and bun (275)    ¨ 1½ cups of macaroni and cheese (310)    ¨ Taco salad with a fried tortilla shell (870)    · Low-calorie foods:      ¨ 1 tablespoon of ketchup or 1 tablespoon of fat free sour cream (15)    ¨ 1 teaspoon of mustard (5)    ¨ ¼ cup of salsa (20)    ¨ 1 large dill pickle (15)    ¨ 1 tablespoon of fat free salad dressing (10)    ¨ 2 teaspoons of low-sugar, light jam or jelly, or 1 tablespoon of sugar-free syrup (15)    ¨ 1 sugar-free popsicle (15)    ¨ 1 cup of club soda, seltzer water, or diet soda (0)  CARE AGREEMENT:   You have the right to help plan your care  Discuss treatment options with your caregivers to decide what care you want to receive  You always have the right to refuse treatment  The above information is an  only  It is not intended as medical advice for individual conditions or treatments  Talk to your doctor, nurse or pharmacist before following any medical regimen to see if it is safe and effective for you  © 2017 [address] [employer] Information is for End User's use only and may not be sold, redistributed or otherwise used for commercial purposes  All illustrations and images included in CareNotes® are the copyrighted property of [employer]  or Malachi Santos

## 2019-07-23 NOTE — ASSESSMENT & PLAN NOTE
BMI 44 with comorbidities  Physical activity has been limited due to joint pains related to RA, advised aquatic therapy  Also, decrease portion sizes

## 2019-07-23 NOTE — PROGRESS NOTES
Assessment and Plan:     Problem List Items Addressed This Visit     None         History of Present Illness:     Patient presents for Medicare Annual Wellness visit    Patient Care Team:  Dario Zamora DO as PCP - General  DO Raudel Purvis MD Maridee Balm, MD     Problem List:     Patient Active Problem List   Diagnosis    [facility]rombocytopenia [facility]    Hypoalbuminemia    Rheumatoid arthritis ([facility]    GERD (gastroesophageal reflux disease)    Sarcoid    Benign essential hypertension    Morbid obesity ([facility]    Lumbar radiculopathy    Osteoporosis    Vitamin D deficiency    Rash    BPPV (benign paroxysmal positional vertigo)    Seasonal allergic rhinitis due to pollen    Acute rhinosinusitis      Past Medical and Surgical History:     Past Medical History:   Diagnosis Date    Abnormal thyroid function test     last assessed: 2015     Arthritis     Caries     last assessed: 2016     Edema of right lower extremity     last assessed: 2015     GERD (gastroesophageal reflux disease)     Hypertension     Sarcoid      Past Surgical History:   Procedure Laterality Date     SECTION      MULTIPLE TOOTH EXTRACTIONS N/A 2016    Procedure: Surgical extraction of teeth 2, 18, 23, 30, 32; incision and drainage of left subperiosteal abscess ;  Surgeon: Eri Ayoub DMD;  Location: [facility] OR;  Service:       Family History:     Family History   Problem Relation Age of Onset    Asthma Mother     Stroke Mother     No Known Problems Father     No Known Problems Sister     No Known Problems Brother     No Known Problems Maternal Grandmother     No Known Problems Maternal Grandfather     No Known Problems Paternal Grandmother     No Known Problems Paternal Grandfather     Diabetes Maternal Aunt     Breast cancer Cousin     Diabetes Cousin     Breast cancer Other       Social History:     Social History     Tobacco Use   Smoking Status Never Smoker   Smokeless Tobacco Never Used     Social History     Substance and Sexual Activity   Alcohol Use No     Social History     Substance and Sexual Activity   Drug Use No      Medications and Allergies:     Current Outpatient Medications   Medication Sig Dispense Refill    acetaminophen (TYLENOL) 325 mg tablet Take 2 tablets (650 mg total) by mouth every 4 (four) hours as needed for mild pain, headaches or fever  30 tablet 0    Adalimumab (HUMIRA PEN) 40 MG/0 8ML PNKT Inject 40 mg under the skin every 14 (fourteen) days      alendronate (FOSAMAX) 70 mg tablet Take by mouth every 7 days       cholecalciferol (VITAMIN D3) 1,000 units tablet Take 1,000 Units by mouth daily      hydroxychloroquine (PLAQUENIL) 200 mg tablet Take 200 mg by mouth 2 (two) times a day   ketoconazole (NIZORAL) 2 % cream Apply topically 2 (two) times a day 30 g 0    multivitamin (THERAGRAN) TABS Take 1 tablet by mouth daily      NORVASC 10 MG tablet take 1 tablet by mouth once daily 90 tablet 1    omeprazole (PriLOSEC) 20 mg delayed release capsule take 1 capsule by mouth once daily 90 capsule 0    predniSONE 5 mg tablet Take 5 mg by mouth daily  1 mg prednisone 2 x/day      traMADol (ULTRAM) 50 mg tablet Take 50 mg by mouth 3 (three) times a day   triamcinolone (KENALOG) 0 1 % cream Apply 1 application topically 2 (two) times a day To affected area  0    promethazine-codeine (PHENERGAN WITH CODEINE) 6 25-10 mg/5 mL syrup Take 5 mL by mouth every 4 (four) hours as needed for cough (Patient not taking: Reported on 7/23/2019) 120 mL 0     No current facility-administered medications for this visit        Allergies   Allergen Reactions    Methotrexate Derivatives     Shellfish-Derived Products Itching    Amoxicillin Rash    Ampicillin-Sulbactam Sodium Rash      Immunizations:     Immunization History   Administered Date(s) Administered    INFLUENZA 10/31/2018    Influenza Split High Dose Preservative Free IM 12/04/2014, 12/17/2015, 10/28/2016, 10/28/2016, 10/27/2017    Influenza, high dose seasonal 0 5 mL 10/31/2018    Pneumococcal Conjugate 13-Valent 12/04/2014    Pneumococcal Polysaccharide PPV23 12/17/2015      Medicare Screening Tests and Risk Assessments:     Jessika Ferrer is here for her Subsequent Wellness visit  Last Medicare Wellness visit information reviewed, patient interviewed and updates made to the record today  Health Risk Assessment:  Patient rates overall health as good  Patient feels that their physical health rating is Same  Eyesight was rated as Same  Hearing was rated as Slightly worse  Patient feels that their emotional and mental health rating is Same  Pain experienced by patient in the last 7 days has been Some  Patient's pain rating has been 5/10  Patient states that she has experienced no weight loss or gain in last 6 months  (Additional comments: Reports decreased hearing, has to turn up volume when watching tv, denies tinnitus  Has back and b/l knee pain)    Emotional/Mental Health:  Patient has not been feeling nervous/anxious  PHQ-9 Depression Screening:    Frequency of the following problems over the past two weeks:      1  Little interest or pleasure in doing things: 1 - several days      2  Feeling down, depressed, or hopeless: 0 - not at all  PHQ-2 Score: 1          Broken Bones/Falls: Fall Risk Assessment:    In the past year, patient has experienced: No history of falling in past year          Bladder/Bowel:  Patient has leaked urine accidently in the last six months  Patient reports no loss of bowel control  Immunizations:  Patient has not had a flu vaccination within the last year  Patient has not received a pneumonia shot  Patient has not received a shingles shot  Patient has not received tetanus/diphtheria shot  Home Safety:  Patient has trouble with stairs inside or outside of their home     Patient currently reports that there are safety hazards present in home , working smoke alarms, working carbon monoxide detectors  (Additional Comments: Due to knee and back pain)    Preventative Screenings:   No breast cancer screening performed, no colon cancer screen completed, cholesterol screen completed, glaucoma eye exam completed,     Nutrition:  Current diet: Regular and Limited junk food with servings of the following:    Medications:  Patient is not currently taking any over-the-counter supplements  Patient is able to manage medications  Lifestyle Choices:  Patient reports no tobacco use  Patient has not smoked or used tobacco in the past   Patient reports no alcohol use  Patient does not drive a vehicle  Patient wears seat belt  Current level of exercise of physical activity described by patient as: none  Activities of Daily Living:  Can get out of bed by his or her self, able to dress self, able to make own meals, able to do own shopping, able to bathe self, unable to do laundry/housekeeping, can manage own money, pay bills and track expensesAdditional Comments: Unable to stand for a long time when doing laundry    Previous Hospitalizations:  No hospitalization or ED visit in past 12 months        Advanced Directives:  Patient has decided on a power of   Patient has spoken to designated power of   Patient has not completed advanced directive          Preventative Screening/Counseling:      Cardiovascular:      General: Risks and Benefits Discussed and Screening Current          Diabetes:      General: Risks and Benefits Discussed and Screening Current          Colorectal Cancer:      General: Risks and Benefits Discussed and Screening Current      Comments: Last colonoscopy 7/12/16        Breast Cancer:      General: Risks and Benefits Discussed          Cervical Cancer:      General: Screening Not Indicated      Comments: Due to advanced age        Osteoporosis:      General: Risks and Benefits Discussed      Counseling: Calcium and Vitamin D Intake and Regular Weightbearing Exercise      Comments: Due in January 2020, followed by Dr Sekou Turner        AAA:      General: Screening Not Indicated          Glaucoma:      General: Risks and Benefits Discussed and Screening Current      Comments: Followed by Dr Tacho Null        HIV:      General: Screening Not Indicated          Hepatitis C:      General: Risks and Benefits Discussed      Counseling: has received general HCV counseling        Advanced Directives:   Patient has no living will for healthcare, does not have durable POA for healthcare, patient does not have an advanced directive  Information on ACP and/or AD provided  Immunizations:      Influenza: Risks & Benefits Discussed and Influenza Recommended Annually      Pneumococcal: Lifetime Vaccine Completed      Shingrix: Risks & Benefits Discussed and Patient Declines      Hepatitis B (Low risk patients): Series Not Indicated      Zostavax: Risks & Benefits Discussed and Patient Declines      TD: Patient Declines and Risks & Benefits Discussed      TDAP: Risks & Benefits Discussed and Patient Declines      Other Preventative Counseling (Non-Medicare): Fall Prevention, Increase physical activity and Car/seat belt/driving safety reviewed      Referrals:  Referral(s) to: Audiology        BMI Counseling: Body mass index is 44 7 kg/m²  Discussed the patient's BMI with her  The BMI is above average  BMI counseling and education was provided to the patient  Nutrition recommendations include reducing portion sizes, decreasing overall calorie intake and moderation in carbohydrate intake  Exercise recommendations include exercising 3-5 times per week

## 2019-07-23 NOTE — ASSESSMENT & PLAN NOTE
Recommend wean off omeprazole to 20mg every other day x 2 weeks then stop  Take H2B or TUMS for breakthrough symptoms  Patient had underlying osteoporosis

## 2019-07-23 NOTE — ASSESSMENT & PLAN NOTE
Stable  Patient encouraged to decrease prednisone from 5mg in AM and 2 5mg in PM to 5mg daily  She is to remain on Humira [id number] and plaquenil 200mg two tabs daily  Follow up with Rheum

## 2019-10-25 DIAGNOSIS — I10 BENIGN ESSENTIAL HYPERTENSION: ICD-10-CM

## 2019-10-25 RX ORDER — AMLODIPINE BESYLATE 10 MG
10 TABLET ORAL DAILY
Qty: 90 TABLET | Refills: 0 | Status: SHIPPED | OUTPATIENT
Start: 2019-10-25 | End: 2020-01-23 | Stop reason: SDUPTHER

## 2019-10-31 DIAGNOSIS — K21.9 GASTROESOPHAGEAL REFLUX DISEASE WITHOUT ESOPHAGITIS: ICD-10-CM

## 2019-11-01 RX ORDER — OMEPRAZOLE 20 MG/1
CAPSULE, DELAYED RELEASE ORAL
Qty: 90 CAPSULE | Refills: 0 | Status: SHIPPED | OUTPATIENT
Start: 2019-11-01 | End: 2020-02-26

## 2019-12-13 ENCOUNTER — IMMUNIZATIONS (OUTPATIENT)
Dept: INTERNAL MEDICINE CLINIC | Facility: CLINIC | Age: 69
End: 2019-12-13
Payer: COMMERCIAL

## 2019-12-13 DIAGNOSIS — Z23 ENCOUNTER FOR IMMUNIZATION: Primary | ICD-10-CM

## 2019-12-13 PROCEDURE — G0008 ADMIN INFLUENZA VIRUS VAC: HCPCS

## 2019-12-13 PROCEDURE — 90662 IIV NO PRSV INCREASED AG IM: CPT

## 2020-01-17 ENCOUNTER — APPOINTMENT (OUTPATIENT)
Dept: LAB | Facility: HOSPITAL | Age: 70
End: 2020-01-17
Payer: COMMERCIAL

## 2020-01-17 ENCOUNTER — TRANSCRIBE ORDERS (OUTPATIENT)
Dept: LAB | Facility: HOSPITAL | Age: 70
End: 2020-01-17

## 2020-01-17 DIAGNOSIS — Z11.59 NEED FOR HEPATITIS C SCREENING TEST: ICD-10-CM

## 2020-01-17 DIAGNOSIS — M06.09 RHEUMATOID ARTHRITIS OF MULTIPLE SITES WITHOUT RHEUMATOID FACTOR (HCC): ICD-10-CM

## 2020-01-17 DIAGNOSIS — I10 BENIGN ESSENTIAL HYPERTENSION: ICD-10-CM

## 2020-01-17 DIAGNOSIS — E55.9 VITAMIN D DEFICIENCY: ICD-10-CM

## 2020-01-17 DIAGNOSIS — M06.09 RHEUMATOID ARTHRITIS OF MULTIPLE SITES WITHOUT RHEUMATOID FACTOR (HCC): Primary | ICD-10-CM

## 2020-01-17 LAB
25(OH)D3 SERPL-MCNC: 27.6 NG/ML (ref 30–100)
BASOPHILS # BLD AUTO: 0.03 THOUSANDS/ΜL (ref 0–0.1)
BASOPHILS NFR BLD AUTO: 0 % (ref 0–1)
CHOLEST SERPL-MCNC: 171 MG/DL (ref 50–200)
CRP SERPL QL: 4.3 MG/L
EOSINOPHIL # BLD AUTO: 0.04 THOUSAND/ΜL (ref 0–0.61)
EOSINOPHIL NFR BLD AUTO: 1 % (ref 0–6)
ERYTHROCYTE [DISTWIDTH] IN BLOOD BY AUTOMATED COUNT: 15.3 % (ref 11.6–15.1)
ERYTHROCYTE [SEDIMENTATION RATE] IN BLOOD: 23 MM/HOUR (ref 0–20)
HCT VFR BLD AUTO: 43.8 % (ref 34.8–46.1)
HCV AB SER QL: NORMAL
HDLC SERPL-MCNC: 61 MG/DL
HGB BLD-MCNC: 13.5 G/DL (ref 11.5–15.4)
IMM GRANULOCYTES # BLD AUTO: 0.03 THOUSAND/UL (ref 0–0.2)
IMM GRANULOCYTES NFR BLD AUTO: 0 % (ref 0–2)
LDLC SERPL CALC-MCNC: 95 MG/DL (ref 0–100)
LYMPHOCYTES # BLD AUTO: 0.41 THOUSANDS/ΜL (ref 0.6–4.47)
LYMPHOCYTES NFR BLD AUTO: 5 % (ref 14–44)
MCH RBC QN AUTO: 27.8 PG (ref 26.8–34.3)
MCHC RBC AUTO-ENTMCNC: 30.8 G/DL (ref 31.4–37.4)
MCV RBC AUTO: 90 FL (ref 82–98)
MONOCYTES # BLD AUTO: 0.43 THOUSAND/ΜL (ref 0.17–1.22)
MONOCYTES NFR BLD AUTO: 6 % (ref 4–12)
NEUTROPHILS # BLD AUTO: 6.66 THOUSANDS/ΜL (ref 1.85–7.62)
NEUTS SEG NFR BLD AUTO: 88 % (ref 43–75)
NONHDLC SERPL-MCNC: 110 MG/DL
NRBC BLD AUTO-RTO: 0 /100 WBCS
PLATELET # BLD AUTO: 197 THOUSANDS/UL (ref 149–390)
PMV BLD AUTO: 11.2 FL (ref 8.9–12.7)
RBC # BLD AUTO: 4.85 MILLION/UL (ref 3.81–5.12)
TRIGL SERPL-MCNC: 75 MG/DL
WBC # BLD AUTO: 7.6 THOUSAND/UL (ref 4.31–10.16)

## 2020-01-17 PROCEDURE — 80061 LIPID PANEL: CPT

## 2020-01-17 PROCEDURE — 85652 RBC SED RATE AUTOMATED: CPT

## 2020-01-17 PROCEDURE — 82306 VITAMIN D 25 HYDROXY: CPT

## 2020-01-17 PROCEDURE — 86140 C-REACTIVE PROTEIN: CPT

## 2020-01-17 PROCEDURE — 86430 RHEUMATOID FACTOR TEST QUAL: CPT

## 2020-01-17 PROCEDURE — 86803 HEPATITIS C AB TEST: CPT

## 2020-01-17 PROCEDURE — 85025 COMPLETE CBC W/AUTO DIFF WBC: CPT

## 2020-01-17 PROCEDURE — 86431 RHEUMATOID FACTOR QUANT: CPT

## 2020-01-17 PROCEDURE — 36415 COLL VENOUS BLD VENIPUNCTURE: CPT

## 2020-01-20 LAB
CRYOGLOB RF SER-ACNC: ABNORMAL [IU]/ML
RHEUMATOID FACT SER QL LA: POSITIVE

## 2020-01-22 DIAGNOSIS — I10 BENIGN ESSENTIAL HYPERTENSION: ICD-10-CM

## 2020-01-23 DIAGNOSIS — I10 BENIGN ESSENTIAL HYPERTENSION: ICD-10-CM

## 2020-01-23 RX ORDER — AMLODIPINE BESYLATE 10 MG
10 TABLET ORAL DAILY
Qty: 90 TABLET | Refills: 1 | Status: SHIPPED | OUTPATIENT
Start: 2020-01-23 | End: 2020-03-02 | Stop reason: SDUPTHER

## 2020-01-23 RX ORDER — AMLODIPINE BESYLATE 10 MG
TABLET ORAL
Qty: 90 TABLET | Refills: 0 | OUTPATIENT
Start: 2020-01-23

## 2020-02-20 NOTE — PROGRESS NOTES
Assessment/Plan:    Problem List Items Addressed This Visit        Cardiovascular and Mediastinum    Benign essential hypertension     Elevated today with corresponding weight gain  Strongly advised to lose weight and reinforced low sodium diet  She is to start monitoring her home bp with log for review  Continue on norvasc 10mg daily for now            Musculoskeletal and Integument    Rheumatoid arthritis (HCC) (Chronic)     Has elevated ESR and CRP  Likely will need to increase frequency of Humira to weekly but will defer to Rheum for med changes  She also is on prednisone and plaquenil 200mg twice daily and reminded to go for eye exams  Rash - Primary     Recommend return to [facility] for re-evaluation  Consider drug reaction, recommend holding off on alendronate until she is evaluated by Derm  Other    Vitamin D deficiency     Insufficient range  Recommend increasing dose of vitamin D3 to at least 2000units daily               Subjective:      Patient ID: Chuckie Teixeira is a 68 y o  female  75yo female with GERD, HTN, RA, vitamin D def, osteoporosis, sarcoidosis here for follow up care  She is accompanied by her daughter  Daughter reports her mother's skin has been shedding and has been itchy  Previously seen by Ana Hurtado ago, reports had skin biopsy done but was not made aware of the results and she has not returned for follow up  Patient has noted a rash at around the time she was hospitalized 1-2years ago and shortly after she also restarted alendronate  Hypertension   This is a chronic problem  The current episode started more than 1 year ago  The problem is uncontrolled  Associated symptoms include headaches  Pertinent negatives include no chest pain, palpitations, peripheral edema or shortness of breath  Risk factors for coronary artery disease include obesity  Past treatments include calcium channel blockers  Compliance problems include diet and exercise        Reports return of BL shoulder pain which she had when years ago  Initial involvement of RA was in her hands  No joint swelling  The following portions of the patient's history were reviewed and updated as appropriate: allergies, current medications, past family history, past medical history, past social history, past surgical history and problem list       Current Outpatient Medications:     acetaminophen (TYLENOL) 325 mg tablet, Take 2 tablets (650 mg total) by mouth every 4 (four) hours as needed for mild pain, headaches or fever  , Disp: 30 tablet, Rfl: 0    Adalimumab (HUMIRA PEN) 40 MG/0 8ML PNKT, Inject 40 mg under the skin every 14 (fourteen) days, Disp: , Rfl:     alendronate (FOSAMAX) 70 mg tablet, Take by mouth every 7 days , Disp: , Rfl:     cholecalciferol (VITAMIN D3) 1,000 units tablet, Take 1,000 Units by mouth daily, Disp: , Rfl:     hydroxychloroquine (PLAQUENIL) 200 mg tablet, Take 200 mg by mouth 2 (two) times a day , Disp: , Rfl:     ketoconazole (NIZORAL) 2 % cream, Apply topically 2 (two) times a day, Disp: 60 g, Rfl: 0    multivitamin (THERAGRAN) TABS, Take 1 tablet by mouth daily, Disp: , Rfl:     NORVASC 10 MG tablet, Take 1 tablet (10 mg total) by mouth daily, Disp: 90 tablet, Rfl: 1    omeprazole (PriLOSEC) 20 mg delayed release capsule, take 1 capsule by mouth daily, Disp: 90 capsule, Rfl: 0    predniSONE 5 mg tablet, Take 5 mg by mouth daily  1 mg prednisone 2 x/day, Disp: , Rfl:     promethazine-codeine (PHENERGAN WITH CODEINE) 6 25-10 mg/5 mL syrup, Take 5 mL by mouth every 4 (four) hours as needed for cough, Disp: 120 mL, Rfl: 0    traMADol (ULTRAM) 50 mg tablet, Take 50 mg by mouth 3 (three) times a day , Disp: , Rfl:     triamcinolone (KENALOG) 0 1 % cream, Apply 1 application topically 2 (two) times a day To affected area, Disp: , Rfl: 0    Review of Systems   Constitutional: Negative for activity change, appetite change and fatigue          +weight gain   HENT: Positive for postnasal drip and rhinorrhea  Negative for sneezing and sore throat  Eyes: Positive for visual disturbance  Respiratory: Positive for cough (at night) and wheezing  Negative for shortness of breath and stridor  Cardiovascular: Negative for chest pain, palpitations and leg swelling  Gastrointestinal: Negative for abdominal pain, constipation, diarrhea, nausea and vomiting  Musculoskeletal: Positive for arthralgias, back pain and myalgias  Negative for joint swelling  Knee pain, BL shoulder pain   Skin: Positive for color change and rash  pruritis   Neurological: Positive for headaches  Negative for dizziness, weakness and numbness  Objective:    /80 (BP Location: Right arm, Patient Position: Sitting, Cuff Size: Adult)   Pulse (!) 107   Temp 97 6 °F (36 4 °C) (Tympanic)   Ht 5' 4" (1 626 m)   Wt 124 kg (274 lb)   SpO2 99%   BMI 47 03 kg/m²      Physical Exam   Constitutional: She appears well-developed and well-nourished  No distress  Morbidly obese   HENT:   Head: Normocephalic  Nose: Nose normal    Mouth/Throat: Oropharynx is clear and moist  No oropharyngeal exudate  Neck: Neck supple  Cardiovascular: Normal rate, regular rhythm, normal heart sounds and intact distal pulses  Pulmonary/Chest: Effort normal and breath sounds normal  No stridor  No respiratory distress  Musculoskeletal:   Ambulates with a cane  Small joint hypertrophy without effusion b/l   Neurological: She is alert  Skin: Skin is dry  Rash noted  She is not diaphoretic  There is erythema  Red, dry, exfoliating skin on chest, back, neck and arms  Psychiatric: She has a normal mood and affect  Her behavior is normal    Vitals reviewed        Recent Results (from the past 1008 hour(s))   Hepatitis C antibody    Collection Time: 01/17/20 12:51 PM   Result Value Ref Range    Hepatitis C Ab Non-reactive Non-reactive   Lipid panel    Collection Time: 01/17/20 12:51 PM   Result Value Ref Range    Cholesterol 171 50 - 200 mg/dL    Triglycerides 75 <=150 mg/dL    HDL, Direct 61 >=40 mg/dL    LDL Calculated 95 0 - 100 mg/dL    Non-HDL-Chol (CHOL-HDL) 110 mg/dl   Vitamin D 25 hydroxy    Collection Time: 01/17/20 12:51 PM   Result Value Ref Range    Vit D, 25-Hydroxy 27 6 (L) 30 0 - 100 0 ng/mL   CBC and differential    Collection Time: 01/17/20 12:51 PM   Result Value Ref Range    WBC 7 60 4 31 - 10 16 Thousand/uL    RBC 4 85 3 81 - 5 12 Million/uL    Hemoglobin 13 5 11 5 - 15 4 g/dL    Hematocrit 43 8 34 8 - 46 1 %    MCV 90 82 - 98 fL    MCH 27 8 26 8 - 34 3 pg    MCHC 30 8 (L) 31 4 - 37 4 g/dL    RDW 15 3 (H) 11 6 - 15 1 %    MPV 11 2 8 9 - 12 7 fL    Platelets 296 970 - 107 Thousands/uL    nRBC 0 /100 WBCs    Neutrophils Relative 88 (H) 43 - 75 %    Immat GRANS % 0 0 - 2 %    Lymphocytes Relative 5 (L) 14 - 44 %    Monocytes Relative 6 4 - 12 %    Eosinophils Relative 1 0 - 6 %    Basophils Relative 0 0 - 1 %    Neutrophils Absolute 6 66 1 85 - 7 62 Thousands/µL    Immature Grans Absolute 0 03 0 00 - 0 20 Thousand/uL    Lymphocytes Absolute 0 41 (L) 0 60 - 4 47 Thousands/µL    Monocytes Absolute 0 43 0 17 - 1 22 Thousand/µL    Eosinophils Absolute 0 04 0 00 - 0 61 Thousand/µL    Basophils Absolute 0 03 0 00 - 0 10 Thousands/µL   Sedimentation rate, automated    Collection Time: 01/17/20 12:51 PM   Result Value Ref Range    Sed Rate 23 (H) 0 - 20 mm/hour   C-reactive protein    Collection Time: 01/17/20 12:51 PM   Result Value Ref Range    CRP 4 3 (H) <3 0 mg/L   RF Screen w/ Reflex to Titer    Collection Time: 01/17/20 12:51 PM   Result Value Ref Range    Rheumatoid Factor Positive (A) Negative   Rheumatoid factor quant (Reflex Only- Do Not Order)    Collection Time: 01/17/20 12:51 PM   Result Value Ref Range    RF Quantitation 640 IU/mL (A) (none)

## 2020-02-21 ENCOUNTER — OFFICE VISIT (OUTPATIENT)
Dept: INTERNAL MEDICINE CLINIC | Facility: CLINIC | Age: 70
End: 2020-02-21
Payer: COMMERCIAL

## 2020-02-21 VITALS
BODY MASS INDEX: 46.78 KG/M2 | WEIGHT: 274 LBS | HEART RATE: 107 BPM | OXYGEN SATURATION: 99 % | TEMPERATURE: 97.6 F | HEIGHT: 64 IN | DIASTOLIC BLOOD PRESSURE: 80 MMHG | SYSTOLIC BLOOD PRESSURE: 156 MMHG

## 2020-02-21 DIAGNOSIS — E55.9 VITAMIN D DEFICIENCY: ICD-10-CM

## 2020-02-21 DIAGNOSIS — M06.9 RHEUMATOID ARTHRITIS INVOLVING MULTIPLE SITES, UNSPECIFIED RHEUMATOID FACTOR PRESENCE: Chronic | ICD-10-CM

## 2020-02-21 DIAGNOSIS — R21 RASH: Primary | ICD-10-CM

## 2020-02-21 DIAGNOSIS — I10 BENIGN ESSENTIAL HYPERTENSION: ICD-10-CM

## 2020-02-21 PROCEDURE — 3079F DIAST BP 80-89 MM HG: CPT | Performed by: INTERNAL MEDICINE

## 2020-02-21 PROCEDURE — 3008F BODY MASS INDEX DOCD: CPT | Performed by: NURSE PRACTITIONER

## 2020-02-21 PROCEDURE — 4040F PNEUMOC VAC/ADMIN/RCVD: CPT | Performed by: INTERNAL MEDICINE

## 2020-02-21 PROCEDURE — 99214 OFFICE O/P EST MOD 30 MIN: CPT | Performed by: INTERNAL MEDICINE

## 2020-02-21 PROCEDURE — 3077F SYST BP >= 140 MM HG: CPT | Performed by: INTERNAL MEDICINE

## 2020-02-21 PROCEDURE — 1160F RVW MEDS BY RX/DR IN RCRD: CPT | Performed by: INTERNAL MEDICINE

## 2020-02-21 PROCEDURE — 3008F BODY MASS INDEX DOCD: CPT | Performed by: INTERNAL MEDICINE

## 2020-02-21 PROCEDURE — 1036F TOBACCO NON-USER: CPT | Performed by: INTERNAL MEDICINE

## 2020-02-21 NOTE — ASSESSMENT & PLAN NOTE
Has elevated ESR and CRP  Likely will need to increase frequency of Humira to weekly but will defer to Rheum for med changes  She also is on prednisone and plaquenil 200mg twice daily and reminded to go for eye exams

## 2020-02-21 NOTE — ASSESSMENT & PLAN NOTE
Elevated today with corresponding weight gain  Strongly advised to lose weight and reinforced low sodium diet  She is to start monitoring her home bp with log for review    Continue on norvasc 10mg daily for now

## 2020-02-21 NOTE — ASSESSMENT & PLAN NOTE
Recommend return to [facility] for re-evaluation  Consider drug reaction, recommend holding off on alendronate until she is evaluated by Derm

## 2020-02-26 DIAGNOSIS — K21.9 GASTROESOPHAGEAL REFLUX DISEASE WITHOUT ESOPHAGITIS: ICD-10-CM

## 2020-02-26 RX ORDER — OMEPRAZOLE 20 MG/1
CAPSULE, DELAYED RELEASE ORAL
Qty: 90 CAPSULE | Refills: 0 | Status: SHIPPED | OUTPATIENT
Start: 2020-02-26 | End: 2020-05-19 | Stop reason: SDUPTHER

## 2020-03-02 DIAGNOSIS — I10 BENIGN ESSENTIAL HYPERTENSION: ICD-10-CM

## 2020-03-02 RX ORDER — AMLODIPINE BESYLATE 10 MG
10 TABLET ORAL DAILY
Qty: 90 TABLET | Refills: 1 | Status: SHIPPED | OUTPATIENT
Start: 2020-03-02 | End: 2020-03-03

## 2020-03-03 DIAGNOSIS — I10 BENIGN ESSENTIAL HYPERTENSION: ICD-10-CM

## 2020-03-03 RX ORDER — AMLODIPINE BESYLATE 10 MG/1
TABLET ORAL
Qty: 90 TABLET | Refills: 1 | Status: SHIPPED | OUTPATIENT
Start: 2020-03-03 | End: 2020-03-19 | Stop reason: SDUPTHER

## 2020-03-19 DIAGNOSIS — I10 BENIGN ESSENTIAL HYPERTENSION: ICD-10-CM

## 2020-03-19 RX ORDER — AMLODIPINE BESYLATE 10 MG/1
10 TABLET ORAL DAILY
Qty: 90 TABLET | Refills: 1 | Status: SHIPPED | OUTPATIENT
Start: 2020-03-19 | End: 2020-12-09 | Stop reason: SDUPTHER

## 2020-05-19 DIAGNOSIS — K21.9 GASTROESOPHAGEAL REFLUX DISEASE WITHOUT ESOPHAGITIS: ICD-10-CM

## 2020-05-19 RX ORDER — OMEPRAZOLE 20 MG/1
20 CAPSULE, DELAYED RELEASE ORAL DAILY
Qty: 90 CAPSULE | Refills: 1 | Status: SHIPPED | OUTPATIENT
Start: 2020-05-19 | End: 2020-10-30

## 2020-06-18 ENCOUNTER — TELEMEDICINE (OUTPATIENT)
Dept: INTERNAL MEDICINE CLINIC | Facility: CLINIC | Age: 70
End: 2020-06-18
Payer: COMMERCIAL

## 2020-06-18 DIAGNOSIS — J01.90 ACUTE RHINOSINUSITIS: ICD-10-CM

## 2020-06-18 DIAGNOSIS — R05.9 COUGH: Primary | ICD-10-CM

## 2020-06-18 PROCEDURE — 1160F RVW MEDS BY RX/DR IN RCRD: CPT | Performed by: NURSE PRACTITIONER

## 2020-06-18 PROCEDURE — 99213 OFFICE O/P EST LOW 20 MIN: CPT | Performed by: NURSE PRACTITIONER

## 2020-06-18 RX ORDER — PROMETHAZINE HYDROCHLORIDE AND CODEINE PHOSPHATE 6.25; 1 MG/5ML; MG/5ML
5 SYRUP ORAL EVERY 4 HOURS PRN
Qty: 120 ML | Refills: 0 | Status: SHIPPED | OUTPATIENT
Start: 2020-06-18 | End: 2021-12-02 | Stop reason: SDUPTHER

## 2020-10-30 DIAGNOSIS — K21.9 GASTROESOPHAGEAL REFLUX DISEASE WITHOUT ESOPHAGITIS: ICD-10-CM

## 2020-10-30 RX ORDER — OMEPRAZOLE 20 MG/1
CAPSULE, DELAYED RELEASE ORAL
Qty: 90 CAPSULE | Refills: 0 | Status: SHIPPED | OUTPATIENT
Start: 2020-10-30 | End: 2021-01-28

## 2020-11-03 ENCOUNTER — TELEPHONE (OUTPATIENT)
Dept: INTERNAL MEDICINE CLINIC | Facility: CLINIC | Age: 70
End: 2020-11-03

## 2020-11-04 ENCOUNTER — APPOINTMENT (OUTPATIENT)
Dept: LAB | Facility: HOSPITAL | Age: 70
End: 2020-11-04
Attending: INTERNAL MEDICINE
Payer: COMMERCIAL

## 2020-11-04 ENCOUNTER — TRANSCRIBE ORDERS (OUTPATIENT)
Dept: LAB | Facility: HOSPITAL | Age: 70
End: 2020-11-04

## 2020-11-04 DIAGNOSIS — M06.09 RHEUMATOID ARTHRITIS OF MULTIPLE SITES WITHOUT RHEUMATOID FACTOR (HCC): Primary | ICD-10-CM

## 2020-11-04 DIAGNOSIS — M06.09 RHEUMATOID ARTHRITIS OF MULTIPLE SITES WITHOUT RHEUMATOID FACTOR (HCC): ICD-10-CM

## 2020-11-04 LAB
25(OH)D3 SERPL-MCNC: 52.3 NG/ML (ref 30–100)
ALBUMIN SERPL BCP-MCNC: 3.2 G/DL (ref 3.5–5)
ALP SERPL-CCNC: 84 U/L (ref 46–116)
ALT SERPL W P-5'-P-CCNC: 30 U/L (ref 12–78)
AST SERPL W P-5'-P-CCNC: 24 U/L (ref 5–45)
BASOPHILS # BLD AUTO: 0.05 THOUSANDS/ΜL (ref 0–0.1)
BASOPHILS NFR BLD AUTO: 1 % (ref 0–1)
BILIRUB DIRECT SERPL-MCNC: 0.11 MG/DL (ref 0–0.2)
BILIRUB SERPL-MCNC: 0.32 MG/DL (ref 0.2–1)
CALCIUM SERPL-MCNC: 8.7 MG/DL (ref 8.3–10.1)
CREAT SERPL-MCNC: 0.94 MG/DL (ref 0.6–1.3)
CRP SERPL QL: 3.1 MG/L
EOSINOPHIL # BLD AUTO: 0.13 THOUSAND/ΜL (ref 0–0.61)
EOSINOPHIL NFR BLD AUTO: 2 % (ref 0–6)
ERYTHROCYTE [DISTWIDTH] IN BLOOD BY AUTOMATED COUNT: 15 % (ref 11.6–15.1)
ERYTHROCYTE [SEDIMENTATION RATE] IN BLOOD: 55 MM/HOUR (ref 0–29)
GFR SERPL CREATININE-BSD FRML MDRD: 70 ML/MIN/1.73SQ M
HCT VFR BLD AUTO: 45.2 % (ref 34.8–46.1)
HGB BLD-MCNC: 13.2 G/DL (ref 11.5–15.4)
IMM GRANULOCYTES # BLD AUTO: 0.02 THOUSAND/UL (ref 0–0.2)
IMM GRANULOCYTES NFR BLD AUTO: 0 % (ref 0–2)
LYMPHOCYTES # BLD AUTO: 0.83 THOUSANDS/ΜL (ref 0.6–4.47)
LYMPHOCYTES NFR BLD AUTO: 10 % (ref 14–44)
MCH RBC QN AUTO: 27 PG (ref 26.8–34.3)
MCHC RBC AUTO-ENTMCNC: 29.2 G/DL (ref 31.4–37.4)
MCV RBC AUTO: 93 FL (ref 82–98)
MONOCYTES # BLD AUTO: 0.81 THOUSAND/ΜL (ref 0.17–1.22)
MONOCYTES NFR BLD AUTO: 10 % (ref 4–12)
NEUTROPHILS # BLD AUTO: 6.61 THOUSANDS/ΜL (ref 1.85–7.62)
NEUTS SEG NFR BLD AUTO: 77 % (ref 43–75)
NRBC BLD AUTO-RTO: 0 /100 WBCS
PLATELET # BLD AUTO: 199 THOUSANDS/UL (ref 149–390)
PMV BLD AUTO: 11.8 FL (ref 8.9–12.7)
PROT SERPL-MCNC: 7.4 G/DL (ref 6.4–8.2)
RBC # BLD AUTO: 4.88 MILLION/UL (ref 3.81–5.12)
WBC # BLD AUTO: 8.45 THOUSAND/UL (ref 4.31–10.16)

## 2020-11-04 PROCEDURE — 36415 COLL VENOUS BLD VENIPUNCTURE: CPT

## 2020-11-04 PROCEDURE — 85025 COMPLETE CBC W/AUTO DIFF WBC: CPT

## 2020-11-04 PROCEDURE — 82310 ASSAY OF CALCIUM: CPT

## 2020-11-04 PROCEDURE — 80076 HEPATIC FUNCTION PANEL: CPT

## 2020-11-04 PROCEDURE — 82565 ASSAY OF CREATININE: CPT

## 2020-11-04 PROCEDURE — 82306 VITAMIN D 25 HYDROXY: CPT

## 2020-11-04 PROCEDURE — 85652 RBC SED RATE AUTOMATED: CPT

## 2020-11-04 PROCEDURE — 86140 C-REACTIVE PROTEIN: CPT

## 2020-11-11 ENCOUNTER — OFFICE VISIT (OUTPATIENT)
Dept: INTERNAL MEDICINE CLINIC | Facility: CLINIC | Age: 70
End: 2020-11-11
Payer: COMMERCIAL

## 2020-11-11 VITALS
DIASTOLIC BLOOD PRESSURE: 80 MMHG | HEIGHT: 64 IN | OXYGEN SATURATION: 98 % | WEIGHT: 270 LBS | HEART RATE: 100 BPM | SYSTOLIC BLOOD PRESSURE: 128 MMHG | RESPIRATION RATE: 16 BRPM | TEMPERATURE: 98.7 F | BODY MASS INDEX: 46.1 KG/M2

## 2020-11-11 DIAGNOSIS — M54.16 LUMBAR RADICULOPATHY: ICD-10-CM

## 2020-11-11 DIAGNOSIS — I10 BENIGN ESSENTIAL HYPERTENSION: ICD-10-CM

## 2020-11-11 DIAGNOSIS — Z00.00 MEDICARE ANNUAL WELLNESS VISIT, SUBSEQUENT: ICD-10-CM

## 2020-11-11 DIAGNOSIS — M06.9 RHEUMATOID ARTHRITIS INVOLVING MULTIPLE SITES, UNSPECIFIED WHETHER RHEUMATOID FACTOR PRESENT (HCC): Primary | Chronic | ICD-10-CM

## 2020-11-11 DIAGNOSIS — Z12.31 ENCOUNTER FOR SCREENING MAMMOGRAM FOR BREAST CANCER: ICD-10-CM

## 2020-11-11 DIAGNOSIS — E55.9 VITAMIN D DEFICIENCY: ICD-10-CM

## 2020-11-11 DIAGNOSIS — Z23 ENCOUNTER FOR IMMUNIZATION: ICD-10-CM

## 2020-11-11 PROBLEM — R05.9 COUGH: Status: RESOLVED | Noted: 2018-03-30 | Resolved: 2020-11-11

## 2020-11-11 PROCEDURE — 3074F SYST BP LT 130 MM HG: CPT | Performed by: INTERNAL MEDICINE

## 2020-11-11 PROCEDURE — 90662 IIV NO PRSV INCREASED AG IM: CPT | Performed by: INTERNAL MEDICINE

## 2020-11-11 PROCEDURE — 90471 IMMUNIZATION ADMIN: CPT | Performed by: INTERNAL MEDICINE

## 2020-11-11 PROCEDURE — G0008 ADMIN INFLUENZA VIRUS VAC: HCPCS | Performed by: INTERNAL MEDICINE

## 2020-11-11 PROCEDURE — 1036F TOBACCO NON-USER: CPT | Performed by: INTERNAL MEDICINE

## 2020-11-11 PROCEDURE — 1170F FXNL STATUS ASSESSED: CPT | Performed by: INTERNAL MEDICINE

## 2020-11-11 PROCEDURE — 3079F DIAST BP 80-89 MM HG: CPT | Performed by: INTERNAL MEDICINE

## 2020-11-11 PROCEDURE — 1160F RVW MEDS BY RX/DR IN RCRD: CPT | Performed by: INTERNAL MEDICINE

## 2020-11-11 PROCEDURE — 3008F BODY MASS INDEX DOCD: CPT | Performed by: INTERNAL MEDICINE

## 2020-11-11 PROCEDURE — G0439 PPPS, SUBSEQ VISIT: HCPCS | Performed by: INTERNAL MEDICINE

## 2020-11-11 PROCEDURE — 99214 OFFICE O/P EST MOD 30 MIN: CPT | Performed by: INTERNAL MEDICINE

## 2020-11-11 PROCEDURE — 3725F SCREEN DEPRESSION PERFORMED: CPT | Performed by: INTERNAL MEDICINE

## 2020-11-11 PROCEDURE — 1125F AMNT PAIN NOTED PAIN PRSNT: CPT | Performed by: INTERNAL MEDICINE

## 2020-11-11 PROCEDURE — 90715 TDAP VACCINE 7 YRS/> IM: CPT | Performed by: INTERNAL MEDICINE

## 2020-12-09 DIAGNOSIS — I10 BENIGN ESSENTIAL HYPERTENSION: ICD-10-CM

## 2020-12-09 RX ORDER — AMLODIPINE BESYLATE 10 MG
TABLET ORAL
Qty: 90 TABLET | Refills: 3 | OUTPATIENT
Start: 2020-12-09

## 2020-12-09 RX ORDER — AMLODIPINE BESYLATE 10 MG/1
10 TABLET ORAL DAILY
Qty: 90 TABLET | Refills: 1 | Status: SHIPPED | OUTPATIENT
Start: 2020-12-09 | End: 2021-06-14 | Stop reason: SDUPTHER

## 2020-12-10 DIAGNOSIS — I10 BENIGN ESSENTIAL HYPERTENSION: ICD-10-CM

## 2020-12-10 RX ORDER — AMLODIPINE BESYLATE 10 MG/1
TABLET ORAL
Qty: 90 TABLET | Refills: 1 | OUTPATIENT
Start: 2020-12-10

## 2020-12-15 ENCOUNTER — VBI (OUTPATIENT)
Dept: ADMINISTRATIVE | Facility: OTHER | Age: 70
End: 2020-12-15

## 2021-01-28 DIAGNOSIS — K21.9 GASTROESOPHAGEAL REFLUX DISEASE WITHOUT ESOPHAGITIS: ICD-10-CM

## 2021-01-28 RX ORDER — OMEPRAZOLE 20 MG/1
CAPSULE, DELAYED RELEASE ORAL
Qty: 90 CAPSULE | Refills: 3 | Status: SHIPPED | OUTPATIENT
Start: 2021-01-28 | End: 2022-02-07

## 2021-02-24 ENCOUNTER — VBI (OUTPATIENT)
Dept: ADMINISTRATIVE | Facility: OTHER | Age: 71
End: 2021-02-24

## 2021-03-05 DIAGNOSIS — I10 BENIGN ESSENTIAL HYPERTENSION: ICD-10-CM

## 2021-03-05 RX ORDER — AMLODIPINE BESYLATE 10 MG/1
TABLET ORAL
Qty: 90 TABLET | Refills: 1 | OUTPATIENT
Start: 2021-03-05

## 2021-03-23 ENCOUNTER — VBI (OUTPATIENT)
Dept: ADMINISTRATIVE | Facility: OTHER | Age: 71
End: 2021-03-23

## 2021-04-23 ENCOUNTER — TELEPHONE (OUTPATIENT)
Dept: INTERNAL MEDICINE CLINIC | Facility: CLINIC | Age: 71
End: 2021-04-23

## 2021-04-23 NOTE — TELEPHONE ENCOUNTER
She should get the COVID vaccination  However, I would have her check in with her Rheumatologist to see if they recommend withholding her plaquenil and humira before the vaccine    Thanks

## 2021-04-23 NOTE — TELEPHONE ENCOUNTER
Pt is getting covid vaccine next Thursday and she wanted to make sure with all her meds that there is no reason for her not to get the vaccination  SHe can be reached at [phone]

## 2021-04-29 ENCOUNTER — IMMUNIZATIONS (OUTPATIENT)
Dept: FAMILY MEDICINE CLINIC | Facility: HOSPITAL | Age: 71
End: 2021-04-29

## 2021-04-29 DIAGNOSIS — Z23 ENCOUNTER FOR IMMUNIZATION: Primary | ICD-10-CM

## 2021-04-29 PROCEDURE — 0001A SARS-COV-2 / COVID-19 MRNA VACCINE (PFIZER-BIONTECH) 30 MCG: CPT

## 2021-04-29 PROCEDURE — 91300 SARS-COV-2 / COVID-19 MRNA VACCINE (PFIZER-BIONTECH) 30 MCG: CPT

## 2021-05-25 ENCOUNTER — RA CDI HCC (OUTPATIENT)
Dept: OTHER | Facility: HOSPITAL | Age: 71
End: 2021-05-25

## 2021-05-25 NOTE — PROGRESS NOTES
[facility]  coding opportunities          Chart reviewed, no opportunity found: CHART REVIEWED, NO OPPORTUNITY FOUND              Patients insurance company: [employer] coding opportunities

## 2021-05-27 ENCOUNTER — IMMUNIZATIONS (OUTPATIENT)
Dept: FAMILY MEDICINE CLINIC | Facility: HOSPITAL | Age: 71
End: 2021-05-27

## 2021-05-27 DIAGNOSIS — Z23 ENCOUNTER FOR IMMUNIZATION: Primary | ICD-10-CM

## 2021-05-27 PROCEDURE — 0002A SARS-COV-2 / COVID-19 MRNA VACCINE (PFIZER-BIONTECH) 30 MCG: CPT

## 2021-05-27 PROCEDURE — 91300 SARS-COV-2 / COVID-19 MRNA VACCINE (PFIZER-BIONTECH) 30 MCG: CPT

## 2021-06-10 ENCOUNTER — VBI (OUTPATIENT)
Dept: ADMINISTRATIVE | Facility: OTHER | Age: 71
End: 2021-06-10

## 2021-06-14 DIAGNOSIS — I10 BENIGN ESSENTIAL HYPERTENSION: ICD-10-CM

## 2021-06-14 RX ORDER — AMLODIPINE BESYLATE 10 MG/1
10 TABLET ORAL DAILY
Qty: 90 TABLET | Refills: 0 | Status: SHIPPED | OUTPATIENT
Start: 2021-06-14 | End: 2021-06-15 | Stop reason: SDUPTHER

## 2021-06-15 RX ORDER — AMLODIPINE BESYLATE 10 MG/1
10 TABLET ORAL DAILY
Qty: 14 TABLET | Refills: 0 | Status: SHIPPED | OUTPATIENT
Start: 2021-06-15 | End: 2021-09-23 | Stop reason: SDUPTHER

## 2021-06-15 NOTE — TELEPHONE ENCOUNTER
Patient is asking for a few days worth of amlodipine to be sent to the local pharmacy until she receives the mail order  She waiting to see if she can arrange transportation to come in for an appointment

## 2021-06-21 DIAGNOSIS — I10 BENIGN ESSENTIAL HYPERTENSION: ICD-10-CM

## 2021-06-21 NOTE — TELEPHONE ENCOUNTER
This patient is evidently overdue for a office visit with Dr Jose Mcelroy    We can only supply her with 14 days of pills please contact her to schedule appointment and determine which local pharmacy she would like to get the a 14 day supply from thank you

## 2021-06-28 RX ORDER — AMLODIPINE BESYLATE 10 MG/1
10 TABLET ORAL DAILY
Qty: 14 TABLET | Refills: 0 | OUTPATIENT
Start: 2021-06-28

## 2021-07-30 NOTE — TELEPHONE ENCOUNTER
Pt said that the pharmacy did not get the eRx  Called into the pharmacy  Lumbar vertebral fracture

## 2021-08-13 ENCOUNTER — VBI (OUTPATIENT)
Dept: ADMINISTRATIVE | Facility: OTHER | Age: 71
End: 2021-08-13

## 2021-09-22 DIAGNOSIS — I10 BENIGN ESSENTIAL HYPERTENSION: ICD-10-CM

## 2021-09-22 RX ORDER — AMLODIPINE BESYLATE 10 MG/1
10 TABLET ORAL DAILY
Qty: 90 TABLET | Refills: 0
Start: 2021-09-22

## 2021-09-23 ENCOUNTER — TELEPHONE (OUTPATIENT)
Dept: INTERNAL MEDICINE CLINIC | Facility: CLINIC | Age: 71
End: 2021-09-23

## 2021-09-23 DIAGNOSIS — I10 BENIGN ESSENTIAL HYPERTENSION: ICD-10-CM

## 2021-09-23 RX ORDER — AMLODIPINE BESYLATE 10 MG/1
10 TABLET ORAL DAILY
Qty: 30 TABLET | Refills: 0 | Status: SHIPPED | OUTPATIENT
Start: 2021-09-23 | End: 2021-09-24

## 2021-09-23 NOTE — TELEPHONE ENCOUNTER
Pt called and scheduled an appoint for her physical in November  That was the earliest date you had available that she had transportation  Shed like you to order her amlodipine now  She only has enough pills till Friday

## 2021-09-24 RX ORDER — AMLODIPINE BESYLATE 10 MG/1
10 TABLET ORAL DAILY
Qty: 30 TABLET | Refills: 0 | Status: SHIPPED | OUTPATIENT
Start: 2021-09-24 | End: 2021-10-04 | Stop reason: SDUPTHER

## 2021-10-01 ENCOUNTER — TELEPHONE (OUTPATIENT)
Dept: INTERNAL MEDICINE CLINIC | Facility: CLINIC | Age: 71
End: 2021-10-01

## 2021-10-04 DIAGNOSIS — I10 BENIGN ESSENTIAL HYPERTENSION: ICD-10-CM

## 2021-10-04 RX ORDER — AMLODIPINE BESYLATE 10 MG/1
10 TABLET ORAL DAILY
Qty: 30 TABLET | Refills: 0 | Status: SHIPPED | OUTPATIENT
Start: 2021-10-04 | End: 2021-12-02 | Stop reason: SDUPTHER

## 2021-10-04 RX ORDER — CLOBETASOL PROPIONATE 0.5 MG/G
EMULSION TOPICAL
COMMUNITY
Start: 2021-09-29

## 2021-10-20 ENCOUNTER — TELEPHONE (OUTPATIENT)
Dept: INTERNAL MEDICINE CLINIC | Facility: CLINIC | Age: 71
End: 2021-10-20

## 2021-12-02 ENCOUNTER — OFFICE VISIT (OUTPATIENT)
Dept: INTERNAL MEDICINE CLINIC | Facility: CLINIC | Age: 71
End: 2021-12-02
Payer: COMMERCIAL

## 2021-12-02 VITALS
TEMPERATURE: 98.1 F | HEART RATE: 76 BPM | SYSTOLIC BLOOD PRESSURE: 160 MMHG | WEIGHT: 255 LBS | OXYGEN SATURATION: 98 % | HEIGHT: 64 IN | DIASTOLIC BLOOD PRESSURE: 70 MMHG | BODY MASS INDEX: 43.54 KG/M2 | RESPIRATION RATE: 17 BRPM

## 2021-12-02 DIAGNOSIS — J01.90 ACUTE RHINOSINUSITIS: ICD-10-CM

## 2021-12-02 DIAGNOSIS — Z00.00 MEDICARE ANNUAL WELLNESS VISIT, SUBSEQUENT: Primary | ICD-10-CM

## 2021-12-02 DIAGNOSIS — I10 BENIGN ESSENTIAL HYPERTENSION: ICD-10-CM

## 2021-12-02 DIAGNOSIS — E66.01 MORBID OBESITY (HCC): ICD-10-CM

## 2021-12-02 DIAGNOSIS — Z13.6 SCREENING FOR HEART DISEASE: ICD-10-CM

## 2021-12-02 DIAGNOSIS — M54.16 LUMBAR RADICULOPATHY: ICD-10-CM

## 2021-12-02 DIAGNOSIS — F11.20 CONTINUOUS OPIOID DEPENDENCE (HCC): ICD-10-CM

## 2021-12-02 DIAGNOSIS — Z13.1 SCREENING FOR DIABETES MELLITUS: ICD-10-CM

## 2021-12-02 DIAGNOSIS — Z23 ENCOUNTER FOR IMMUNIZATION: ICD-10-CM

## 2021-12-02 DIAGNOSIS — M81.0 OSTEOPOROSIS, UNSPECIFIED OSTEOPOROSIS TYPE, UNSPECIFIED PATHOLOGICAL FRACTURE PRESENCE: ICD-10-CM

## 2021-12-02 DIAGNOSIS — R01.1 MURMUR, CARDIAC: ICD-10-CM

## 2021-12-02 DIAGNOSIS — Z12.11 SCREENING FOR COLON CANCER: ICD-10-CM

## 2021-12-02 DIAGNOSIS — M06.9 RHEUMATOID ARTHRITIS INVOLVING MULTIPLE SITES, UNSPECIFIED WHETHER RHEUMATOID FACTOR PRESENT (HCC): ICD-10-CM

## 2021-12-02 DIAGNOSIS — Z13.29 SCREENING FOR THYROID DISORDER: ICD-10-CM

## 2021-12-02 PROBLEM — E21.3 HYPERPARATHYROIDISM (HCC): Status: ACTIVE | Noted: 2021-12-02

## 2021-12-02 PROCEDURE — 1125F AMNT PAIN NOTED PAIN PRSNT: CPT | Performed by: NURSE PRACTITIONER

## 2021-12-02 PROCEDURE — G0439 PPPS, SUBSEQ VISIT: HCPCS | Performed by: NURSE PRACTITIONER

## 2021-12-02 PROCEDURE — 3725F SCREEN DEPRESSION PERFORMED: CPT | Performed by: NURSE PRACTITIONER

## 2021-12-02 PROCEDURE — G0008 ADMIN INFLUENZA VIRUS VAC: HCPCS | Performed by: NURSE PRACTITIONER

## 2021-12-02 PROCEDURE — 1036F TOBACCO NON-USER: CPT | Performed by: NURSE PRACTITIONER

## 2021-12-02 PROCEDURE — 1160F RVW MEDS BY RX/DR IN RCRD: CPT | Performed by: NURSE PRACTITIONER

## 2021-12-02 PROCEDURE — 3008F BODY MASS INDEX DOCD: CPT | Performed by: NURSE PRACTITIONER

## 2021-12-02 PROCEDURE — 3288F FALL RISK ASSESSMENT DOCD: CPT | Performed by: NURSE PRACTITIONER

## 2021-12-02 PROCEDURE — 1170F FXNL STATUS ASSESSED: CPT | Performed by: NURSE PRACTITIONER

## 2021-12-02 PROCEDURE — 90662 IIV NO PRSV INCREASED AG IM: CPT | Performed by: NURSE PRACTITIONER

## 2021-12-02 PROCEDURE — 99214 OFFICE O/P EST MOD 30 MIN: CPT | Performed by: NURSE PRACTITIONER

## 2021-12-02 RX ORDER — PROMETHAZINE HYDROCHLORIDE AND CODEINE PHOSPHATE 6.25; 1 MG/5ML; MG/5ML
2.5 SYRUP ORAL
Qty: 120 ML | Refills: 0 | Status: SHIPPED | OUTPATIENT
Start: 2021-12-02 | End: 2022-01-26 | Stop reason: HOSPADM

## 2021-12-02 RX ORDER — AMLODIPINE BESYLATE 10 MG/1
10 TABLET ORAL DAILY
Qty: 30 TABLET | Refills: 0 | Status: SHIPPED | OUTPATIENT
Start: 2021-12-02 | End: 2022-01-03 | Stop reason: SDUPTHER

## 2021-12-02 RX ORDER — HYDROCHLOROTHIAZIDE 12.5 MG/1
6.25 TABLET ORAL DAILY
Qty: 30 TABLET | Refills: 0 | Status: ON HOLD | OUTPATIENT
Start: 2021-12-02 | End: 2022-01-26 | Stop reason: SDUPTHER

## 2021-12-02 RX ORDER — CHLORTHALIDONE 25 MG/1
25 TABLET ORAL DAILY
Qty: 30 TABLET | Refills: 0 | Status: SHIPPED | OUTPATIENT
Start: 2021-12-02 | End: 2021-12-02

## 2021-12-15 ENCOUNTER — VBI (OUTPATIENT)
Dept: ADMINISTRATIVE | Facility: OTHER | Age: 71
End: 2021-12-15

## 2021-12-15 ENCOUNTER — TELEPHONE (OUTPATIENT)
Dept: INTERNAL MEDICINE CLINIC | Facility: CLINIC | Age: 71
End: 2021-12-15

## 2021-12-29 ENCOUNTER — VBI (OUTPATIENT)
Dept: ADMINISTRATIVE | Facility: OTHER | Age: 71
End: 2021-12-29

## 2022-01-03 DIAGNOSIS — I10 BENIGN ESSENTIAL HYPERTENSION: ICD-10-CM

## 2022-01-03 RX ORDER — AMLODIPINE BESYLATE 10 MG/1
10 TABLET ORAL DAILY
Qty: 30 TABLET | Refills: 4 | Status: SHIPPED | OUTPATIENT
Start: 2022-01-03 | End: 2022-01-26 | Stop reason: HOSPADM

## 2022-01-10 ENCOUNTER — APPOINTMENT (INPATIENT)
Dept: NON INVASIVE DIAGNOSTICS | Facility: HOSPITAL | Age: 72
DRG: 595 | End: 2022-01-10
Payer: COMMERCIAL

## 2022-01-10 ENCOUNTER — NURSE TRIAGE (OUTPATIENT)
Dept: OTHER | Facility: OTHER | Age: 72
End: 2022-01-10

## 2022-01-10 ENCOUNTER — HOSPITAL ENCOUNTER (INPATIENT)
Facility: HOSPITAL | Age: 72
LOS: 16 days | Discharge: NURSING, REHAB OR LONG-TERM CARE | DRG: 595 | End: 2022-01-26
Attending: EMERGENCY MEDICINE | Admitting: HOSPITALIST
Payer: COMMERCIAL

## 2022-01-10 ENCOUNTER — APPOINTMENT (EMERGENCY)
Dept: RADIOLOGY | Facility: HOSPITAL | Age: 72
DRG: 595 | End: 2022-01-10
Payer: COMMERCIAL

## 2022-01-10 DIAGNOSIS — I10 BENIGN ESSENTIAL HYPERTENSION: ICD-10-CM

## 2022-01-10 DIAGNOSIS — R21 SKIN RASH: ICD-10-CM

## 2022-01-10 DIAGNOSIS — R53.1 WEAKNESS: ICD-10-CM

## 2022-01-10 DIAGNOSIS — I48.91 ATRIAL FIBRILLATION, NEW ONSET (HCC): Primary | ICD-10-CM

## 2022-01-10 DIAGNOSIS — I48.91 NEW ONSET A-FIB (HCC): ICD-10-CM

## 2022-01-10 DIAGNOSIS — M06.9 RHEUMATOID ARTHRITIS INVOLVING MULTIPLE SITES, UNSPECIFIED WHETHER RHEUMATOID FACTOR PRESENT (HCC): Chronic | ICD-10-CM

## 2022-01-10 DIAGNOSIS — A41.9 SEPSIS (HCC): ICD-10-CM

## 2022-01-10 DIAGNOSIS — U07.1 COVID-19: ICD-10-CM

## 2022-01-10 PROBLEM — N17.9 AKI (ACUTE KIDNEY INJURY) (HCC): Status: ACTIVE | Noted: 2022-01-10

## 2022-01-10 PROBLEM — R53.83 LETHARGY: Status: ACTIVE | Noted: 2022-01-10

## 2022-01-10 LAB
2HR DELTA HS TROPONIN: 27 NG/L
4HR DELTA HS TROPONIN: 10 NG/L
AMMONIA PLAS-SCNC: 26 UMOL/L (ref 11–35)
ANION GAP SERPL CALCULATED.3IONS-SCNC: 10 MMOL/L (ref 4–13)
AORTIC ROOT: 2.9 CM
APICAL FOUR CHAMBER EJECTION FRACTION: 62 %
APTT PPP: 27 SECONDS (ref 23–37)
APTT PPP: 38 SECONDS (ref 23–37)
ATRIAL RATE: 300 BPM
ATRIAL RATE: 98 BPM
BACTERIA UR QL AUTO: ABNORMAL /HPF
BASE EX.OXY STD BLDV CALC-SCNC: 80.7 % (ref 60–80)
BASE EXCESS BLDV CALC-SCNC: -1.9 MMOL/L
BASOPHILS # BLD AUTO: 0.05 THOUSANDS/ΜL (ref 0–0.1)
BASOPHILS NFR BLD AUTO: 0 % (ref 0–1)
BILIRUB UR QL STRIP: ABNORMAL
BUN SERPL-MCNC: 17 MG/DL (ref 5–25)
CALCIUM SERPL-MCNC: 9.5 MG/DL (ref 8.3–10.1)
CARDIAC TROPONIN I PNL SERPL HS: 104 NG/L
CARDIAC TROPONIN I PNL SERPL HS: 77 NG/L
CARDIAC TROPONIN I PNL SERPL HS: 87 NG/L
CHLORIDE SERPL-SCNC: 106 MMOL/L (ref 100–108)
CLARITY UR: CLEAR
CO2 SERPL-SCNC: 22 MMOL/L (ref 21–32)
COLOR UR: ABNORMAL
CREAT SERPL-MCNC: 1.29 MG/DL (ref 0.6–1.3)
DOP CALC MV VTI: 21.09 CM
E WAVE DECELERATION TIME: 204 MS
EOSINOPHIL # BLD AUTO: 0.21 THOUSAND/ΜL (ref 0–0.61)
EOSINOPHIL NFR BLD AUTO: 2 % (ref 0–6)
ERYTHROCYTE [DISTWIDTH] IN BLOOD BY AUTOMATED COUNT: 15 % (ref 11.6–15.1)
FLUAV RNA RESP QL NAA+PROBE: NEGATIVE
FLUAV RNA RESP QL NAA+PROBE: NEGATIVE
FLUBV RNA RESP QL NAA+PROBE: NEGATIVE
FLUBV RNA RESP QL NAA+PROBE: NEGATIVE
FRACTIONAL SHORTENING: 23 % (ref 28–44)
GFR SERPL CREATININE-BSD FRML MDRD: 41 ML/MIN/1.73SQ M
GLUCOSE SERPL-MCNC: 78 MG/DL (ref 65–140)
GLUCOSE UR STRIP-MCNC: NEGATIVE MG/DL
HCO3 BLDV-SCNC: 23.3 MMOL/L (ref 24–30)
HCT VFR BLD AUTO: 49.2 % (ref 34.8–46.1)
HGB BLD-MCNC: 15.3 G/DL (ref 11.5–15.4)
HGB UR QL STRIP.AUTO: NEGATIVE
HYALINE CASTS #/AREA URNS LPF: ABNORMAL /LPF
IMM GRANULOCYTES # BLD AUTO: 0.04 THOUSAND/UL (ref 0–0.2)
IMM GRANULOCYTES NFR BLD AUTO: 0 % (ref 0–2)
INR PPP: 1.14 (ref 0.84–1.19)
INTERVENTRICULAR SEPTUM IN DIASTOLE (PARASTERNAL SHORT AXIS VIEW): 1.3 CM
KETONES UR STRIP-MCNC: ABNORMAL MG/DL
LACTATE SERPL-SCNC: 2 MMOL/L (ref 0.5–2)
LEFT ATRIUM AREA SYSTOLE SINGLE PLANE A4C: 21.5 CM2
LEFT ATRIUM SIZE: 3.6 CM
LEFT INTERNAL DIMENSION IN SYSTOLE: 2.3 CM (ref 2.1–4)
LEFT VENTRICULAR INTERNAL DIMENSION IN DIASTOLE: 3 CM (ref 8.05–12)
LEFT VENTRICULAR POSTERIOR WALL IN END DIASTOLE: 1.4 CM
LEFT VENTRICULAR STROKE VOLUME: 18 ML
LEUKOCYTE ESTERASE UR QL STRIP: ABNORMAL
LYMPHOCYTES # BLD AUTO: 1.43 THOUSANDS/ΜL (ref 0.6–4.47)
LYMPHOCYTES NFR BLD AUTO: 13 % (ref 14–44)
MAGNESIUM SERPL-MCNC: 2 MG/DL (ref 1.6–2.6)
MCH RBC QN AUTO: 27.2 PG (ref 26.8–34.3)
MCHC RBC AUTO-ENTMCNC: 31.1 G/DL (ref 31.4–37.4)
MCV RBC AUTO: 87 FL (ref 82–98)
MONOCYTES # BLD AUTO: 1.01 THOUSAND/ΜL (ref 0.17–1.22)
MONOCYTES NFR BLD AUTO: 9 % (ref 4–12)
MV E'TISSUE VEL-SEP: 6 CM/S
MV MEAN GRADIENT: 2 MMHG
MV PEAK A VEL: 0.99 M/S
MV PEAK E VEL: 93 CM/S
MV PEAK GRADIENT: 4 MMHG
MV STENOSIS PRESSURE HALF TIME: 0 MS
NEUTROPHILS # BLD AUTO: 8.41 THOUSANDS/ΜL (ref 1.85–7.62)
NEUTS SEG NFR BLD AUTO: 76 % (ref 43–75)
NITRITE UR QL STRIP: NEGATIVE
NON-SQ EPI CELLS URNS QL MICRO: ABNORMAL /HPF
NRBC BLD AUTO-RTO: 0 /100 WBCS
NT-PROBNP SERPL-MCNC: 517 PG/ML
O2 CT BLDV-SCNC: 17.3 ML/DL
P AXIS: 41 DEGREES
PCO2 BLDV: 41.4 MM HG (ref 42–50)
PH BLDV: 7.37 [PH] (ref 7.3–7.4)
PH UR STRIP.AUTO: 5.5 [PH]
PLATELET # BLD AUTO: 225 THOUSANDS/UL (ref 149–390)
PMV BLD AUTO: 11.5 FL (ref 8.9–12.7)
PO2 BLDV: 47.6 MM HG (ref 35–45)
POTASSIUM SERPL-SCNC: 4.5 MMOL/L (ref 3.5–5.3)
PR INTERVAL: 130 MS
PROCALCITONIN SERPL-MCNC: 0.33 NG/ML
PROT UR STRIP-MCNC: ABNORMAL MG/DL
PROTHROMBIN TIME: 14.1 SECONDS (ref 11.6–14.5)
QRS AXIS: 0 DEGREES
QRS AXIS: 17 DEGREES
QRSD INTERVAL: 80 MS
QRSD INTERVAL: 82 MS
QT INTERVAL: 294 MS
QT INTERVAL: 338 MS
QTC INTERVAL: 405 MS
QTC INTERVAL: 431 MS
RBC # BLD AUTO: 5.63 MILLION/UL (ref 3.81–5.12)
RBC #/AREA URNS AUTO: ABNORMAL /HPF
RIGHT ATRIUM AREA SYSTOLE A4C: 16.6 CM2
RIGHT VENTRICLE ID DIMENSION: 3.4 CM
RSV RNA RESP QL NAA+PROBE: NEGATIVE
RSV RNA RESP QL NAA+PROBE: NEGATIVE
SARS-COV-2 RNA RESP QL NAA+PROBE: NEGATIVE
SARS-COV-2 RNA RESP QL NAA+PROBE: NEGATIVE
SL CV PED ECHO LEFT VENTRICLE DIASTOLIC VOLUME (MOD BIPLANE) 2D: 35 ML
SL CV PED ECHO LEFT VENTRICLE SYSTOLIC VOLUME (MOD BIPLANE) 2D: 17 ML
SODIUM SERPL-SCNC: 138 MMOL/L (ref 136–145)
SP GR UR STRIP.AUTO: >=1.03 (ref 1–1.03)
T WAVE AXIS: 47 DEGREES
T WAVE AXIS: 53 DEGREES
T4 FREE SERPL-MCNC: 1.57 NG/DL (ref 0.76–1.46)
TSH SERPL DL<=0.05 MIU/L-ACNC: 6.42 UIU/ML (ref 0.36–3.74)
UROBILINOGEN UR QL STRIP.AUTO: 1 E.U./DL
VENTRICULAR RATE: 114 BPM
VENTRICULAR RATE: 98 BPM
WBC # BLD AUTO: 11.15 THOUSAND/UL (ref 4.31–10.16)
WBC #/AREA URNS AUTO: ABNORMAL /HPF
Z-SCORE OF LEFT VENTRICULAR DIMENSION IN END SYSTOLE: -11.87

## 2022-01-10 PROCEDURE — 99285 EMERGENCY DEPT VISIT HI MDM: CPT | Performed by: EMERGENCY MEDICINE

## 2022-01-10 PROCEDURE — 99223 1ST HOSP IP/OBS HIGH 75: CPT | Performed by: HOSPITALIST

## 2022-01-10 PROCEDURE — 82805 BLOOD GASES W/O2 SATURATION: CPT | Performed by: HOSPITALIST

## 2022-01-10 PROCEDURE — 85025 COMPLETE CBC W/AUTO DIFF WBC: CPT | Performed by: INTERNAL MEDICINE

## 2022-01-10 PROCEDURE — 0241U HB NFCT DS VIR RESP RNA 4 TRGT: CPT | Performed by: INTERNAL MEDICINE

## 2022-01-10 PROCEDURE — 82140 ASSAY OF AMMONIA: CPT | Performed by: HOSPITALIST

## 2022-01-10 PROCEDURE — 99285 EMERGENCY DEPT VISIT HI MDM: CPT

## 2022-01-10 PROCEDURE — 85610 PROTHROMBIN TIME: CPT | Performed by: INTERNAL MEDICINE

## 2022-01-10 PROCEDURE — 83880 ASSAY OF NATRIURETIC PEPTIDE: CPT | Performed by: HOSPITALIST

## 2022-01-10 PROCEDURE — 84484 ASSAY OF TROPONIN QUANT: CPT | Performed by: HOSPITALIST

## 2022-01-10 PROCEDURE — 84439 ASSAY OF FREE THYROXINE: CPT | Performed by: HOSPITALIST

## 2022-01-10 PROCEDURE — C8929 TTE W OR WO FOL WCON,DOPPLER: HCPCS

## 2022-01-10 PROCEDURE — 80048 BASIC METABOLIC PNL TOTAL CA: CPT | Performed by: INTERNAL MEDICINE

## 2022-01-10 PROCEDURE — 93306 TTE W/DOPPLER COMPLETE: CPT | Performed by: INTERNAL MEDICINE

## 2022-01-10 PROCEDURE — 93005 ELECTROCARDIOGRAM TRACING: CPT

## 2022-01-10 PROCEDURE — 83605 ASSAY OF LACTIC ACID: CPT | Performed by: HOSPITALIST

## 2022-01-10 PROCEDURE — 87040 BLOOD CULTURE FOR BACTERIA: CPT | Performed by: HOSPITALIST

## 2022-01-10 PROCEDURE — 84145 PROCALCITONIN (PCT): CPT | Performed by: HOSPITALIST

## 2022-01-10 PROCEDURE — 71045 X-RAY EXAM CHEST 1 VIEW: CPT

## 2022-01-10 PROCEDURE — 85730 THROMBOPLASTIN TIME PARTIAL: CPT | Performed by: HOSPITALIST

## 2022-01-10 PROCEDURE — 81001 URINALYSIS AUTO W/SCOPE: CPT | Performed by: HOSPITALIST

## 2022-01-10 PROCEDURE — 96374 THER/PROPH/DIAG INJ IV PUSH: CPT

## 2022-01-10 PROCEDURE — 0241U HB NFCT DS VIR RESP RNA 4 TRGT: CPT | Performed by: HOSPITALIST

## 2022-01-10 PROCEDURE — 83735 ASSAY OF MAGNESIUM: CPT | Performed by: HOSPITALIST

## 2022-01-10 PROCEDURE — 99222 1ST HOSP IP/OBS MODERATE 55: CPT | Performed by: INTERNAL MEDICINE

## 2022-01-10 PROCEDURE — 84443 ASSAY THYROID STIM HORMONE: CPT | Performed by: INTERNAL MEDICINE

## 2022-01-10 PROCEDURE — 84484 ASSAY OF TROPONIN QUANT: CPT | Performed by: INTERNAL MEDICINE

## 2022-01-10 PROCEDURE — 85730 THROMBOPLASTIN TIME PARTIAL: CPT | Performed by: INTERNAL MEDICINE

## 2022-01-10 PROCEDURE — 36415 COLL VENOUS BLD VENIPUNCTURE: CPT | Performed by: INTERNAL MEDICINE

## 2022-01-10 PROCEDURE — 93010 ELECTROCARDIOGRAM REPORT: CPT | Performed by: INTERNAL MEDICINE

## 2022-01-10 RX ORDER — MELATONIN
1000 DAILY
Status: DISCONTINUED | OUTPATIENT
Start: 2022-01-10 | End: 2022-01-26 | Stop reason: HOSPADM

## 2022-01-10 RX ORDER — CLOBETASOL PROPIONATE 0.5 MG/G
CREAM TOPICAL 2 TIMES DAILY
Status: DISCONTINUED | OUTPATIENT
Start: 2022-01-10 | End: 2022-01-17

## 2022-01-10 RX ORDER — ACETAMINOPHEN 325 MG/1
650 TABLET ORAL EVERY 4 HOURS PRN
Status: DISCONTINUED | OUTPATIENT
Start: 2022-01-10 | End: 2022-01-10

## 2022-01-10 RX ORDER — METOPROLOL TARTRATE 5 MG/5ML
5 INJECTION INTRAVENOUS EVERY 6 HOURS PRN
Status: DISCONTINUED | OUTPATIENT
Start: 2022-01-10 | End: 2022-01-26 | Stop reason: HOSPADM

## 2022-01-10 RX ORDER — PANTOPRAZOLE SODIUM 40 MG/1
40 TABLET, DELAYED RELEASE ORAL
Status: DISCONTINUED | OUTPATIENT
Start: 2022-01-11 | End: 2022-01-26 | Stop reason: HOSPADM

## 2022-01-10 RX ORDER — METOPROLOL TARTRATE 5 MG/5ML
5 INJECTION INTRAVENOUS ONCE
Status: COMPLETED | OUTPATIENT
Start: 2022-01-10 | End: 2022-01-10

## 2022-01-10 RX ORDER — SODIUM CHLORIDE, SODIUM GLUCONATE, SODIUM ACETATE, POTASSIUM CHLORIDE, MAGNESIUM CHLORIDE, SODIUM PHOSPHATE, DIBASIC, AND POTASSIUM PHOSPHATE .53; .5; .37; .037; .03; .012; .00082 G/100ML; G/100ML; G/100ML; G/100ML; G/100ML; G/100ML; G/100ML
50 INJECTION, SOLUTION INTRAVENOUS CONTINUOUS
Status: DISCONTINUED | OUTPATIENT
Start: 2022-01-10 | End: 2022-01-14

## 2022-01-10 RX ORDER — ACETAMINOPHEN 325 MG/1
650 TABLET ORAL EVERY 4 HOURS PRN
Status: DISCONTINUED | OUTPATIENT
Start: 2022-01-10 | End: 2022-01-26 | Stop reason: HOSPADM

## 2022-01-10 RX ORDER — SODIUM CHLORIDE, SODIUM GLUCONATE, SODIUM ACETATE, POTASSIUM CHLORIDE, MAGNESIUM CHLORIDE, SODIUM PHOSPHATE, DIBASIC, AND POTASSIUM PHOSPHATE .53; .5; .37; .037; .03; .012; .00082 G/100ML; G/100ML; G/100ML; G/100ML; G/100ML; G/100ML; G/100ML
50 INJECTION, SOLUTION INTRAVENOUS CONTINUOUS
Status: DISCONTINUED | OUTPATIENT
Start: 2022-01-10 | End: 2022-01-10

## 2022-01-10 RX ORDER — CLOTRIMAZOLE 1 %
CREAM (GRAM) TOPICAL 2 TIMES DAILY
Status: DISCONTINUED | OUTPATIENT
Start: 2022-01-10 | End: 2022-01-26 | Stop reason: HOSPADM

## 2022-01-10 RX ORDER — PREDNISONE 1 MG/1
5 TABLET ORAL DAILY
Status: DISCONTINUED | OUTPATIENT
Start: 2022-01-10 | End: 2022-01-26 | Stop reason: HOSPADM

## 2022-01-10 RX ORDER — SODIUM CHLORIDE 9 MG/ML
3 INJECTION INTRAVENOUS
Status: DISCONTINUED | OUTPATIENT
Start: 2022-01-10 | End: 2022-01-26 | Stop reason: HOSPADM

## 2022-01-10 RX ORDER — HEPARIN SODIUM 10000 [USP'U]/100ML
3-20 INJECTION, SOLUTION INTRAVENOUS
Status: DISCONTINUED | OUTPATIENT
Start: 2022-01-10 | End: 2022-01-10

## 2022-01-10 RX ORDER — HYDROXYCHLOROQUINE SULFATE 200 MG/1
200 TABLET, FILM COATED ORAL 2 TIMES DAILY
Status: DISCONTINUED | OUTPATIENT
Start: 2022-01-10 | End: 2022-01-26 | Stop reason: HOSPADM

## 2022-01-10 RX ADMIN — SODIUM CHLORIDE, SODIUM GLUCONATE, SODIUM ACETATE, POTASSIUM CHLORIDE, MAGNESIUM CHLORIDE, SODIUM PHOSPHATE, DIBASIC, AND POTASSIUM PHOSPHATE 50 ML/HR: .53; .5; .37; .037; .03; .012; .00082 INJECTION, SOLUTION INTRAVENOUS at 19:29

## 2022-01-10 RX ADMIN — Medication 1000 UNITS: at 18:23

## 2022-01-10 RX ADMIN — METOPROLOL TARTRATE 5 MG: 5 INJECTION, SOLUTION INTRAVENOUS at 10:39

## 2022-01-10 RX ADMIN — ACETAMINOPHEN 650 MG: 325 TABLET, FILM COATED ORAL at 21:17

## 2022-01-10 RX ADMIN — PERFLUTREN 0.6 ML/MIN: 6.52 INJECTION, SUSPENSION INTRAVENOUS at 15:30

## 2022-01-10 RX ADMIN — METOPROLOL TARTRATE 50 MG: 25 TABLET, FILM COATED ORAL at 12:35

## 2022-01-10 RX ADMIN — DOXYCYCLINE 100 MG: 100 INJECTION, POWDER, LYOPHILIZED, FOR SOLUTION INTRAVENOUS at 22:57

## 2022-01-10 RX ADMIN — METOPROLOL TARTRATE 25 MG: 25 TABLET, FILM COATED ORAL at 18:23

## 2022-01-10 RX ADMIN — APIXABAN 5 MG: 5 TABLET, FILM COATED ORAL at 18:17

## 2022-01-10 RX ADMIN — CEFTRIAXONE SODIUM 1000 MG: 10 INJECTION, POWDER, FOR SOLUTION INTRAVENOUS at 22:13

## 2022-01-10 RX ADMIN — SODIUM CHLORIDE, SODIUM GLUCONATE, SODIUM ACETATE, POTASSIUM CHLORIDE, MAGNESIUM CHLORIDE, SODIUM PHOSPHATE, DIBASIC, AND POTASSIUM PHOSPHATE 50 ML/HR: .53; .5; .37; .037; .03; .012; .00082 INJECTION, SOLUTION INTRAVENOUS at 16:38

## 2022-01-10 RX ADMIN — PREDNISONE 5 MG: 5 TABLET ORAL at 18:23

## 2022-01-10 RX ADMIN — HEPARIN SODIUM 11.1 UNITS/KG/HR: 10000 INJECTION, SOLUTION INTRAVENOUS at 12:35

## 2022-01-10 NOTE — TELEPHONE ENCOUNTER
Regarding: Mucus in Eyes loss of appetiite diffuculty breathing   ----- Message from Kathleen Minaya MA sent at 1/10/2022  8:43 AM EST -----  " I have mucus in my eyes loss of appetite and shaky and difficulty breathing"

## 2022-01-10 NOTE — TELEPHONE ENCOUNTER
Reason for Disposition   New neurologic deficit that is present NOW, sudden onset of ANY of the following: * Weakness of the face, arm, or leg on one side of the body* Numbness of the face, arm, or leg on one side of the body* Loss of speech or garbled speech    Answer Assessment - Initial Assessment Questions  Called and spoke with daughter, asked to speak to patient and the daughter stated that she is unable to talk, I asked if this was a new symptom for her and they stated yes  I told them to call 911 now, They agreed and disconnected call      Protocols used: NEUROLOGIC DEFICIT-ADULT-OH

## 2022-01-10 NOTE — ED ATTENDING ATTESTATION
1/10/2022  ILiz DO, saw and evaluated the patient  I have discussed the patient with the resident/non-physician practitioner and agree with the resident's/non-physician practitioner's findings, Plan of Care, and MDM as documented in the resident's/non-physician practitioner's note, except where noted  All available labs and Radiology studies were reviewed  I was present for key portions of any procedure(s) performed by the resident/non-physician practitioner and I was immediately available to provide assistance  At this point I agree with the current assessment done in the Emergency Department  I have conducted an independent evaluation of this patient a history and physical is as follows:    75-year-old female, says for the last 2 days she has been feeling tired fatigued and run down, slight nonproductive cough, runny nose and drainage from the eyes  No focal weakness, just feeling fatigued overall  No chest pain, no palpitations, she cannot tell her heart rate is beating fast   No nausea, no vomiting, no dysuria or hematuria  Says she has no history of atrial fibrillation  No travel history or sick contacts  Patient had initial COVID vaccines in April and May 2021    General:  Patient is well-appearing  Head:  Atraumatic  Eyes:  Conjunctiva pink bilaterally, she does have some slight crusting to the eyes  Pupils are equal reactive to light, extraocular muscles are intact    ENT:  Mucous membranes are moist  Neck:  Supple  Cardiac:  S1-S2, tachycardic irregularly irregular rhythm  Lungs:  Clear to auscultation bilaterally  Abdomen:  Soft, nontender, normal bowel sounds, no CVA tenderness, no tympany, no rigidity, no guarding  Extremities:  Normal range of motion, no pedal edema or calf asymmetry, radial pulses are equal and symmetric bilaterally  Neurologic:  Awake, fluent speech, normal comprehension, AAOx3  Skin:  Pink warm and dry, no rash  Psychiatric:  Alert, pleasant, cooperative      ED Course  ED Course as of 01/10/22 1107   Mon Jamie 10, 2022   1037 ECG interpreted by me, atrial fibrillation, rapid ventricular response, rate of 114, normal axis, no ischemic or infarct changes, AFib is new when compared with August 23, 2016     Medications   sodium chloride (PF) 0 9 % injection 3 mL (has no administration in time range)   heparin (porcine) 25,000 units in 0 45% NaCl 250 mL infusion (premix) (11 1 Units/kg/hr × 90 kg (Order-Specific) Intravenous New Bag 1/10/22 1235)   metoprolol tartrate (LOPRESSOR) tablet 50 mg (has no administration in time range)   metoprolol (LOPRESSOR) injection 5 mg (has no administration in time range)   metoprolol (LOPRESSOR) injection 5 mg (5 mg Intravenous Given 1/10/22 1039)   metoprolol tartrate (LOPRESSOR) tablet 50 mg (50 mg Oral Given 1/10/22 1235)         Patient given 5 mg of metoprolol IV, on reassessment, remained in atrial fibrillation but heart rate much better controlled, average rate between 80 and 95  Started on heparin for anticoagulation  Patient appears to be new onset atrial fibrillation, is being admitted for further management  Critical Care Time  Procedures  Critical care time:  33 minutes    Please see my separate procedure note for details

## 2022-01-10 NOTE — ED PROVIDER NOTES
History  Chief Complaint   Patient presents with    Weakness - Generalized     pt reports generalized weakness since sunday  No SOB, urinary sxs, just overall feels weak  HPI  73-year-old female presents to ED for evaluation of weakness and cough  She reports symptoms started yesterday and gradually worsen  She also reports associated congestion  She patient reports she is eating and drinking but her appetite is low  She denies nausea, vomiting or abdominal pain  She denies diarrhea, melena or hematochezia  She denies any chest pain or shortness of breath  Patient denies headache, visual changes, dizziness, fevers, chills, chest pain, palpitations, dysuria, new skin rashes or numbness or tingling of the extremities  Patient denies cardiac history or lung history  Patient denies anticoagulation use  Patient reports she is vaccinated for COVID x2  Prior to Admission Medications   Prescriptions Last Dose Informant Patient Reported? Taking? Adalimumab (HUMIRA PEN) 40 MG/0 8ML PNKT   Yes No   Sig: Inject 40 mg under the skin every 14 (fourteen) days   Clobetasol Propionate E 0 05 % emollient cream   Yes No   Sig: APPLY TWICE A DAY FOR PALM SKIN DERMITITS   acetaminophen (TYLENOL) 325 mg tablet   No No   Sig: Take 2 tablets (650 mg total) by mouth every 4 (four) hours as needed for mild pain, headaches or fever     alendronate (FOSAMAX) 70 mg tablet   Yes No   Sig: Take by mouth every 7 days    amLODIPine (NORVASC) 10 mg tablet   No No   Sig: Take 1 tablet (10 mg total) by mouth daily   cholecalciferol (VITAMIN D3) 1,000 units tablet  Self Yes No   Sig: Take 1,000 Units by mouth daily   hydrochlorothiazide (HYDRODIURIL) 12 5 mg tablet   No No   Sig: Take 0 5 tablets (6 25 mg total) by mouth daily   hydroxychloroquine (PLAQUENIL) 200 mg tablet   Yes No   Sig: Take 200 mg by mouth 2 (two) times a day    ketoconazole (NIZORAL) 2 % cream   No No   Sig: Apply topically 2 (two) times a day   multivitamin [facility]) TABS  Self Yes No   Sig: Take 1 tablet by mouth daily   Patient not taking: Reported on 2021    omeprazole (PriLOSEC) 20 mg delayed release capsule   No No   Sig: TAKE 1 CAPSULE DAILY   predniSONE 5 mg tablet   Yes No   Sig: Take 5 mg by mouth daily  1 mg prednisone 2 x/day   promethazine-codeine (PHENERGAN WITH CODEINE) 6 25-10 mg/5 mL syrup   No No   Sig: Take 2 5 mL by mouth daily at bedtime as needed for cough   triamcinolone (KENALOG) 0 1 % cream   Yes No   Sig: Apply 1 application topically 2 (two) times a day To affected area      Facility-Administered Medications: None       Past Medical History:   Diagnosis Date    Abnormal thyroid function test     last assessed: 2015     Arthritis     Caries     last assessed: 2016     Edema of right lower extremity     last assessed: 2015     GERD (gastroesophageal reflux disease)     Hypertension     Sarcoid        Past Surgical History:   Procedure Laterality Date     SECTION      MULTIPLE TOOTH EXTRACTIONS N/A 2016    Procedure: Surgical extraction of teeth 2, 18, 19, 30, 32; incision and drainage of left subperiosteal abscess ;  Surgeon: Christine Shearer DMD;  Location: [facility];  Service:        Family History   Problem Relation Age of Onset    Asthma Mother     Stroke Mother     No Known Problems Father     No Known Problems Sister     No Known Problems Brother     No Known Problems Maternal Grandmother     No Known Problems Maternal Grandfather     No Known Problems Paternal Grandmother     No Known Problems Paternal Grandfather     Diabetes Maternal Aunt     Breast cancer Cousin     Diabetes Cousin     Breast cancer Other      I have reviewed and agree with the history as documented  E-Cigarette/Vaping     E-Cigarette/Vaping Substances     Social History     Tobacco Use    Smoking status: Never Smoker    Smokeless tobacco: Never Used   Substance Use Topics    Alcohol use:  No  Drug use: No        Review of Systems   All other systems reviewed and are negative  Physical Exam  ED Triage Vitals   Temperature Pulse Respirations Blood Pressure SpO2   01/10/22 1013 01/10/22 1013 01/10/22 1013 01/10/22 1013 01/10/22 1013   98 6 °F (37 °C) (!) 129 20 141/76 94 %      Temp Source Heart Rate Source Patient Position - Orthostatic VS BP Location FiO2 (%)   01/10/22 1013 01/10/22 1013 01/10/22 1013 01/10/22 1013 --   Oral Monitor Lying Right arm       Pain Score       01/10/22 1100       No Pain             Orthostatic Vital Signs  Vitals:    01/10/22 1013 01/10/22 1100   BP: 141/76 137/60   Pulse: (!) 129 96   Patient Position - Orthostatic VS: Lying Lying       Physical Exam   PHYSICAL EXAM    Constitutional:  Well developed, well nourished, no acute distress, ill appearing  HEENT:  Conjunctiva normal  Oropharynx moist  Congested   Respiratory:  No respiratory distress, normal breath sounds, +cough  Cardiovascular:  Irregular rate and rhythm, no murmurs  GI:  Soft, nondistended, normal bowel sounds, nontender  :  No costovertebral angle tenderness   Musculoskeletal:  No edema, no tenderness, no deformities  Integument:  Well hydrated, no rash   Lymphatic:  No lymphadenopathy noted   Neurologic:  Alert & oriented, normal motor function, normal sensory function, no focal deficits noted   Psychiatric:  Speech and behavior appropriate       ED Medications  Medications   sodium chloride (PF) 0 9 % injection 3 mL (has no administration in time range)   metoprolol tartrate (LOPRESSOR) tablet 50 mg (has no administration in time range)   heparin (porcine) 25,000 units in 0 45% NaCl 250 mL infusion (premix) (has no administration in time range)   metoprolol (LOPRESSOR) injection 5 mg (5 mg Intravenous Given 1/10/22 1039)       Diagnostic Studies  Results Reviewed     Procedure Component Value Units Date/Time    CBC and differential [[id number]] Collected: 01/10/22 1207    Lab Status:  In process Specimen: Blood from Arm, Left Updated: 01/10/22 1211    HS Troponin I 4hr [[id number]]     Lab Status: No result Specimen: Blood     HS Troponin I 2hr [[id number]]     Lab Status: No result Specimen: Blood     HS Troponin 0hr (reflex protocol) [[id number]]  (Abnormal) Collected: 01/10/22 1043    Lab Status: Final result Specimen: Blood from Arm, Right Updated: 01/10/22 1155     hs TnI 0hr 77 ng/L     COVID/FLU/RSV [[id number]]  (Normal) Collected: 01/10/22 1031    Lab Status: Final result Specimen: Nares from Nasopharyngeal Swab Updated: 01/10/22 1155     SARS-CoV-2 Negative     INFLUENZA A PCR Negative     INFLUENZA B PCR Negative     RSV PCR Negative    Narrative:      FOR PEDIATRIC PATIENTS - copy/paste COVID Guidelines URL to browser: [email or web address]    SARS-CoV-2 assay is a Nucleic Acid Amplification assay intended for the  qualitative detection of nucleic acid from SARS-CoV-2 in nasopharyngeal  swabs  Results are for the presumptive identification of SARS-CoV-2 RNA  Positive results are indicative of infection with SARS-CoV-2, the virus  causing COVID-19, but do not rule out bacterial infection or co-infection  with other viruses  Laboratories within the [place] and its  territories are required to report all positive results to the appropriate  public health authorities  Negative results do not preclude SARS-CoV-2  infection and should not be used as the sole basis for treatment or other  patient management decisions  Negative results must be combined with  clinical observations, patient history, and epidemiological information  This test has not been FDA cleared or approved  This test has been authorized by FDA under an Emergency Use Authorization  (EUA)   This test is only authorized for the duration of time the  declaration that circumstances exist justifying the authorization of the  emergency use of an in vitro diagnostic tests for detection of SARS-CoV-2  virus and/or diagnosis of COVID-19 infection under section 564(b)(1) of  the Act, 21 U  S C  520GVD-4(U)(1), unless the authorization is terminated  or revoked sooner  The test has been validated but independent review by FDA  and CLIA is pending  Test performed using [employer] Gene[employer]pert: This RT-PCR assay targets N2,  a region unique to SARS-CoV-2  A conserved region in the E-gene was chosen  for pan-Sarbecovirus detection which includes SARS-CoV-2  Zuri Thakkar [[id number]]  (Normal) Collected: 01/10/22 1043    Lab Status: Final result Specimen: Blood from Arm, Right Updated: 01/10/22 1128     Protime 14 1 seconds      INR 1 14    APTT [[id number]]  (Normal) Collected: 01/10/22 1043    Lab Status: Final result Specimen: Blood from Arm, Right Updated: 01/10/22 1128     PTT 27 seconds     TSH, 3rd generation [[id number]]  (Abnormal) Collected: 01/10/22 1031    Lab Status: Final result Specimen: Blood from Arm, Left Updated: 01/10/22 1120     TSH 3RD GENERATON 6 420 uIU/mL     Narrative:      Patients undergoing fluorescein dye angiography may retain small amounts of fluorescein in the body for 48-72 hours post procedure  Samples containing fluorescein can produce falsely depressed TSH values  If the patient had this procedure,a specimen should be resubmitted post fluorescein clearance        Basic metabolic panel [[id number]] Collected: 01/10/22 1031    Lab Status: Final result Specimen: Blood from Arm, Left Updated: 01/10/22 1120     Sodium 138 mmol/L      Potassium 4 5 mmol/L      Chloride 106 mmol/L      CO2 22 mmol/L      ANION GAP 10 mmol/L      BUN 17 mg/dL      Creatinine 1 29 mg/dL      Glucose 78 mg/dL      Calcium 9 5 mg/dL      eGFR 41 ml/min/1 73sq m     Narrative:      [place] guidelines for Chronic Kidney Disease (CKD):     Stage 1 with normal or high GFR (GFR > 90 mL/min/1 73 square meters)    Stage 2 Mild CKD (GFR = 60-89 mL/min/1 73 square meters)    Stage 3A Moderate CKD (GFR = 45-59 mL/min/1 73 square meters)    Stage 3B Moderate CKD (GFR = 30-44 mL/min/1 73 square meters)    Stage 4 Severe CKD (GFR = 15-29 mL/min/1 73 square meters)    Stage 5 End Stage CKD (GFR <15 mL/min/1 73 square meters)  Note: GFR calculation is accurate only with a steady state creatinine                 X-ray chest 1 view portable   ED Interpretation by Jarred Najera DO (01/10 1110)   Chest x-ray interpreted me shows no acute cardiopulmonary disease, No change from August 23, 2016      Final Result by Gwenetta Leventhal, MD (01/10 1159)      No acute cardiopulmonary disease  Findings concur with the preliminary report by the referring clinician already in PACS and/or our electronic record EPIC  Workstation performed: [id number]               Procedures  Procedures      ED Course  ED Course as of 01/10/22 1217   Mon Jamie 10, 2022   1031 EKG: atrial fibrillation with RVR, rate 114     1202 CXR: No acute cardiopulmonary disease  26 SLIM for new onset afib with rvr, rate controlled with lopressor        [id number] SCORE      Most Recent Value   [id number]    Age 1 Filed at: 01/10/2022 1125   Sex 1 Filed at: 01/10/2022 1125   CHF History 0 Filed at: 01/10/2022 1125   HTN History 1 Filed at: 01/10/2022 1125   Stroke or TIA Symptoms Previously 0 Filed at: 01/10/2022 1125   Vascular Disease History 0 Filed at: 01/10/2022 1125   Diabetes History 0 Filed at: 01/10/2022 1125   [id number] Score 3 Filed at: 01/10/2022 1125                                      MDM  Number of Diagnoses or Management Options  Atrial fibrillation, new onset ([address] )  Weakness  Diagnosis management comments: 78-year-old female presents to ED for evaluation of weakness and cough  Patient found to be in atrial fibrillation with RVR  This appears to be a new diagnosis for patient  Previous EKG showed normal sinus rhythm  Patient is not previously rate control or rhythm control  She has not previously anticoagulated  Patient was rate controlled with Lopressor in the ED  Will start heparin and admit her to Medicine for further management  Troponin also elevated, delta troponin pending  Amount and/or Complexity of Data Reviewed  Clinical lab tests: ordered and reviewed  Tests in the radiology section of CPT®: ordered and reviewed  Discussion of test results with the performing providers: yes  Review and summarize past medical records: yes  Discuss the patient with other providers: yes    Risk of Complications, Morbidity, and/or Mortality  Presenting problems: moderate  Diagnostic procedures: moderate  Management options: moderate    Patient Progress  Patient progress: improved      Disposition  Final diagnoses:   Atrial fibrillation, new onset ([address] )   Weakness     Time reflects when diagnosis was documented in both MDM as applicable and the Disposition within this note     Time User Action Codes Description Comment    1/10/2022 10:28 AM Jeremiah Delaney [[id number]] Atrial fibrillation, new onset ([facility] [address] )     1/10/2022 12:16 PM Jeremiah Delaney [R53 [id number]] Weakness       ED Disposition     ED Disposition Condition Date/Time Comment    Admit Stable Mon Jamie 10, 2022 12:16 PM Case was discussed with XENIA and the patient's admission status was agreed to be Admission Status: inpatient status to the service of Dr Tony Macario    None         Patient's Medications   Discharge Prescriptions    No medications on file     No discharge procedures on file  PDMP Review       Value Time User    PDMP Reviewed  Yes 12/3/2021  7:10 AM Jennifer Grant DO           ED Provider  Attending physically available and evaluated [facility]  I managed the patient along with the ED Attending      Electronically Signed by         Jenna Smith MD  01/10/22 9084

## 2022-01-10 NOTE — ED PROCEDURE NOTE
PROCEDURE  CriticalCare Time  Performed by: Champ Herman DO  Authorized by:  Champ Herman DO     Critical care provider statement:     Critical care time (minutes):  33    Critical care time was exclusive of:  Teaching time and separately billable procedures and treating other patients    Critical care was necessary to treat or prevent imminent or life-threatening deterioration of the following conditions:  Cardiac failure (afib with RVR)    Critical care was time spent personally by me on the following activities:  Ordering and performing treatments and interventions, ordering and review of laboratory studies, ordering and review of radiographic studies, re-evaluation of patient's condition, review of old charts, examination of patient, evaluation of patient's response to treatment, development of treatment plan with patient or surrogate, obtaining history from patient or surrogate and blood draw for specimens    I assumed direction of critical care for this patient from another provider in my specialty: no           Champ Herman DO  01/10/22 9139

## 2022-01-10 NOTE — CONSULTS
Consultation - General Cardiology Team 2  Seamus 66 y o  female MRN: [id number]  Unit/Bed#: ED 23 Encounter: [id number]      Consults  PCP: Canelo Bowman DO   Outpatient Cardiologist: N/a    History of Present Illness   Physician Requesting Consult: Diego Johnston MD  Reason for Consult / Principal Problem: New onset Afib     HPI: Seamus is a 66y o  year old female with a history of HTN, RA, Sarcoidosis, GERD, and osteoarthritis presented to the ED on 1/10 for 1 day of generalized malaise, fatigue, and decreased appetite  Pt is accompanied in the ED by her daughter whom she lives with  Her daughter explains that yesterday Farzana Auguste overall wasn't feeling well and refused to eat, take fluids, or take her medications  She presented to the ED this morning after her granddaughter noticed she seemed fatigued and confused  She has not had a fever or chills  Daughter reports Farzana Auguste has had a cough for the past month or so  Denies any other symptoms  In the ED, an EKG found that she was in Afib with RVR at 114bpm  Patient denies chest pain, palpitations, SOB, or leg swelling  Per her daughter, she does not have any history of any cardiac disease, and does not follow with a cardiologist    On examination, patient is sleepy, but arousable  She denies any chest pain, palpitations, or SOB  Review of Systems   Constitutional: Positive for activity change and appetite change (decreased appetite )  Negative for chills and fever  HENT: Negative for ear pain and sore throat  Eyes: Negative for pain and visual disturbance  Respiratory: Negative for cough and shortness of breath  Cardiovascular: Negative for chest pain and palpitations  Gastrointestinal: Negative for abdominal pain and vomiting  Genitourinary: Negative for dysuria and hematuria  Musculoskeletal: Negative for arthralgias and back pain  Skin: Negative for color change and rash  Neurological: Negative for seizures and syncope     All other systems reviewed and are negative  Review of system was conducted and was negative except for as stated in the HPI        Historical Information   Past Medical History:   Diagnosis Date    Abnormal thyroid function test     last assessed: 2015     Arthritis     Caries     last assessed: 2016     Edema of right lower extremity     last assessed: 2015     GERD (gastroesophageal reflux disease)     Hypertension     Sarcoid      Past Surgical History:   Procedure Laterality Date     SECTION      MULTIPLE TOOTH EXTRACTIONS N/A 2016    Procedure: Surgical extraction of teeth 2, 18, 19, 30, 31; incision and drainage of left subperiosteal abscess ;  Surgeon: Jeannie Contreras DMD;  Location: [facility];  Service:      Social History     Substance and Sexual Activity   Alcohol Use No     Social History     Substance and Sexual Activity   Drug Use No     Social History     Tobacco Use   Smoking Status Never Smoker   Smokeless Tobacco Never Used     Family History: non-contributory    Meds/Allergies   Hospital Medications:   Current Facility-Administered Medications   Medication Dose Route Frequency    acetaminophen (TYLENOL) tablet 650 mg  650 mg Oral Q4H PRN    apixaban (ELIQUIS) tablet 5 mg  5 mg Oral BID    cholecalciferol (VITAMIN D3) tablet 1,000 Units  1,000 Units Oral Daily    clobetasol (TEMOVATE) 0 05 % cream   Topical BID    clotrimazole (LOTRIMIN) 1 % cream   Topical BID    heparin (porcine) 25,000 units in 0 45% NaCl 250 mL infusion (premix)  3-20 Units/kg/hr (Order-Specific) Intravenous Titrated    hydroxychloroquine (PLAQUENIL) tablet 200 mg  200 mg Oral BID    metoprolol (LOPRESSOR) injection 5 mg  5 mg Intravenous Q6H PRN    metoprolol tartrate (LOPRESSOR) tablet 25 mg  25 mg Oral Q12H [address]    multi-electrolyte (PLASMALYTE-A/ISOLYTE-S PH 7 4) IV solution  50 mL/hr Intravenous Continuous    [START ON 2022] pantoprazole (PROTONIX) EC tablet 40 mg  40 mg Oral Early Morning    predniSONE tablet 5 mg  5 mg Oral Daily    sodium chloride (PF) 0 9 % injection 3 mL  3 mL Intravenous Q1H PRN     Home Medications: (Not in a hospital admission)      Allergies   Allergen Reactions    Methotrexate Derivatives     Shellfish-Derived Products - Food Allergy Itching    Amoxicillin Rash    Ampicillin-Sulbactam Sodium Rash       Objective   Vitals: Blood pressure 114/54, pulse 96, temperature 98 6 °F (37 °C), temperature source Oral, resp  rate 20, SpO2 93 %  Orthostatic Blood Pressures      Most Recent Value   Blood Pressure 114/54 filed at 01/10/2022 1500   Patient Position - Orthostatic VS Lying filed at 01/10/2022 1300            Invasive Devices  Report    Peripheral Intravenous Line            Peripheral IV 01/10/22 Left Forearm <1 day                Physical Exam    GEN: [address] appears well, alert and oriented x 3, pleasant and cooperative   HEENT:  Normocephalic, atraumatic, anicteric, moist mucous membranes  NECK: No JVD or carotid bruits   HEART: regular rhythm, regular rate, normal S1 and S2, no murmurs, clicks, gallops or rubs   LUNGS: Clear to auscultation bilaterally; no wheezes, rales, or rhonchi; respiration nonlabored   ABDOMEN:  Normoactive bowel sounds, soft, no tenderness, no distention  EXTREMITIES: peripheral pulses palpable; no edema  NEURO: no gross focal findings; cranial nerves grossly intact   SKIN:  Dry, intact, warm to touch    Lab Results: I have personally reviewed pertinent lab results      Results from last 7 days   Lab Units 01/10/22  1031   NT-PRO BNP pg/mL 517*     Results from last 7 days   Lab Units 01/10/22  1031   POTASSIUM mmol/L 4 5   CO2 mmol/L 22   CHLORIDE mmol/L 106   BUN mg/dL 17   CREATININE mg/dL 1 29     Results from last 7 days   Lab Units 01/10/22  1207   HEMOGLOBIN g/dL 15 3   HEMATOCRIT % 49 2*   PLATELETS Thousands/uL 225     Results from last 7 days   Lab Units 01/10/22  1043   PTT seconds 27 Imaging: I have personally reviewed pertinent reports  X-ray chest 1 view portable  Narrative: CHEST     INDICATION:   chest pain  COMPARISON:  8/23/2016    EXAM PERFORMED/VIEWS:  XR CHEST PORTABLE    FINDINGS:    Cardiomediastinal silhouette appears unremarkable  The lungs are clear  No pneumothorax or pleural effusion  Osseous structures appear within normal limits for patient age  Impression: No acute cardiopulmonary disease  Findings concur with the preliminary report by the referring clinician already in PACS and/or our electronic record EPIC  Workstation performed: [id number]        EKG:   Date: 1/10/2022   Interpretation:   Atrial fibrillation with rapid ventricular response with premature ventricular or aberrantly conducted complexes  Abnormal ECG  When compared with ECG of 23-AUG-2016 13:04,  Atrial fibrillation has replaced Sinus rhythm  Confirmed by Amado Aguilera ([id number]) on 1/10/2022 3:28:41 PM    ECHO:  No results found for this or any previous visit  No results found for this or any previous visit  VTE Prophylaxis: Reason for no pharmacologic prophylaxis eliquis        Assessment/Plan   Bryce Frederick is a 66y o  year old female with a history of HTN, RA, Sarcoidosis, GERD, and osteoarthritis with new onset Afib with RVR     Assessment:    1   New onset Afib with RVR       Plan:  -Pt presented with Afib with RVR at 129, possibly triggered by illness and dehydration as patient has had poor P O intake over the past day   -She is in normal sinus rhythm on examination after 5mg IV metoprolol in the ED   -Will start metoprolol tartrate 25mg PO BID now   -[id number] score is 3 (+1 female, +1 age 74-69, +1 history of HTN), 3 2% stroke risk per year, will begin anticoagulation with Eliquis 5mg BID as patient does not have any other bleeding risks  -Echo done in ED, official read pending                   Case discussed and reviewed with Dr Von Billingsley who agrees with my assessment and plan  Thank you for involving us in the care of your patient  Clarice Caraballo, DO  Family Medicine, PGY-1    ==========================================================================================    Counseling / Coordination of Care  Total floor / unit time spent today 20 minutes minutes  Greater than 50% of total time was spent with the patient and / or family counseling and / or coordination of care  A description of the counseling / coordination of care:          Epic/ Allscripts/Care Everywhere records reviewed:     Please Note: Fluency DirectDictation voice to text software may have been used in the creation of this document

## 2022-01-11 ENCOUNTER — APPOINTMENT (INPATIENT)
Dept: RADIOLOGY | Facility: HOSPITAL | Age: 72
DRG: 595 | End: 2022-01-11
Payer: COMMERCIAL

## 2022-01-11 PROBLEM — R11.10 VOMITING: Status: ACTIVE | Noted: 2022-01-11

## 2022-01-11 LAB
ALBUMIN SERPL BCP-MCNC: 2.8 G/DL (ref 3.5–5)
ALP SERPL-CCNC: 69 U/L (ref 46–116)
ALT SERPL W P-5'-P-CCNC: 27 U/L (ref 12–78)
ANION GAP SERPL CALCULATED.3IONS-SCNC: 10 MMOL/L (ref 4–13)
AST SERPL W P-5'-P-CCNC: 39 U/L (ref 5–45)
BASOPHILS # BLD AUTO: 0.03 THOUSANDS/ΜL (ref 0–0.1)
BASOPHILS NFR BLD AUTO: 0 % (ref 0–1)
BILIRUB SERPL-MCNC: 0.82 MG/DL (ref 0.2–1)
BUN SERPL-MCNC: 22 MG/DL (ref 5–25)
CALCIUM ALBUM COR SERPL-MCNC: 10.2 MG/DL (ref 8.3–10.1)
CALCIUM SERPL-MCNC: 9.2 MG/DL (ref 8.3–10.1)
CHLORIDE SERPL-SCNC: 109 MMOL/L (ref 100–108)
CO2 SERPL-SCNC: 22 MMOL/L (ref 21–32)
CREAT SERPL-MCNC: 1.13 MG/DL (ref 0.6–1.3)
EOSINOPHIL # BLD AUTO: 0.14 THOUSAND/ΜL (ref 0–0.61)
EOSINOPHIL NFR BLD AUTO: 2 % (ref 0–6)
ERYTHROCYTE [DISTWIDTH] IN BLOOD BY AUTOMATED COUNT: 14.9 % (ref 11.6–15.1)
GFR SERPL CREATININE-BSD FRML MDRD: 48 ML/MIN/1.73SQ M
GLUCOSE SERPL-MCNC: 71 MG/DL (ref 65–140)
HCT VFR BLD AUTO: 45.5 % (ref 34.8–46.1)
HGB BLD-MCNC: 14.2 G/DL (ref 11.5–15.4)
IMM GRANULOCYTES # BLD AUTO: 0.04 THOUSAND/UL (ref 0–0.2)
IMM GRANULOCYTES NFR BLD AUTO: 0 % (ref 0–2)
LYMPHOCYTES # BLD AUTO: 0.98 THOUSANDS/ΜL (ref 0.6–4.47)
LYMPHOCYTES NFR BLD AUTO: 11 % (ref 14–44)
MCH RBC QN AUTO: 27.4 PG (ref 26.8–34.3)
MCHC RBC AUTO-ENTMCNC: 31.2 G/DL (ref 31.4–37.4)
MCV RBC AUTO: 88 FL (ref 82–98)
MONOCYTES # BLD AUTO: 0.75 THOUSAND/ΜL (ref 0.17–1.22)
MONOCYTES NFR BLD AUTO: 8 % (ref 4–12)
NEUTROPHILS # BLD AUTO: 7.01 THOUSANDS/ΜL (ref 1.85–7.62)
NEUTS SEG NFR BLD AUTO: 79 % (ref 43–75)
NRBC BLD AUTO-RTO: 0 /100 WBCS
PLATELET # BLD AUTO: 194 THOUSANDS/UL (ref 149–390)
PMV BLD AUTO: 11.2 FL (ref 8.9–12.7)
POTASSIUM SERPL-SCNC: 4.2 MMOL/L (ref 3.5–5.3)
PROCALCITONIN SERPL-MCNC: 0.46 NG/ML
PROT SERPL-MCNC: 7.4 G/DL (ref 6.4–8.2)
RBC # BLD AUTO: 5.19 MILLION/UL (ref 3.81–5.12)
SODIUM SERPL-SCNC: 141 MMOL/L (ref 136–145)
WBC # BLD AUTO: 8.95 THOUSAND/UL (ref 4.31–10.16)

## 2022-01-11 PROCEDURE — 80053 COMPREHEN METABOLIC PANEL: CPT | Performed by: HOSPITALIST

## 2022-01-11 PROCEDURE — 92610 EVALUATE SWALLOWING FUNCTION: CPT

## 2022-01-11 PROCEDURE — 71250 CT THORAX DX C-: CPT

## 2022-01-11 PROCEDURE — G1004 CDSM NDSC: HCPCS

## 2022-01-11 PROCEDURE — NC001 PR NO CHARGE: Performed by: INTERNAL MEDICINE

## 2022-01-11 PROCEDURE — 97163 PT EVAL HIGH COMPLEX 45 MIN: CPT

## 2022-01-11 PROCEDURE — 85025 COMPLETE CBC W/AUTO DIFF WBC: CPT | Performed by: HOSPITALIST

## 2022-01-11 PROCEDURE — 97167 OT EVAL HIGH COMPLEX 60 MIN: CPT

## 2022-01-11 PROCEDURE — 84145 PROCALCITONIN (PCT): CPT | Performed by: HOSPITALIST

## 2022-01-11 PROCEDURE — 99232 SBSQ HOSP IP/OBS MODERATE 35: CPT | Performed by: FAMILY MEDICINE

## 2022-01-11 PROCEDURE — 99232 SBSQ HOSP IP/OBS MODERATE 35: CPT | Performed by: INTERNAL MEDICINE

## 2022-01-11 PROCEDURE — 70450 CT HEAD/BRAIN W/O DYE: CPT

## 2022-01-11 RX ORDER — BENZONATATE 100 MG/1
100 CAPSULE ORAL 3 TIMES DAILY PRN
Status: DISCONTINUED | OUTPATIENT
Start: 2022-01-11 | End: 2022-01-26 | Stop reason: HOSPADM

## 2022-01-11 RX ORDER — CIPROFLOXACIN HYDROCHLORIDE 3.5 MG/ML
1 SOLUTION/ DROPS TOPICAL
Status: DISCONTINUED | OUTPATIENT
Start: 2022-01-11 | End: 2022-01-16

## 2022-01-11 RX ORDER — GUAIFENESIN 600 MG
600 TABLET, EXTENDED RELEASE 12 HR ORAL EVERY 12 HOURS SCHEDULED
Status: DISCONTINUED | OUTPATIENT
Start: 2022-01-11 | End: 2022-01-26 | Stop reason: HOSPADM

## 2022-01-11 RX ORDER — MICONAZOLE NITRATE 20 MG/G
CREAM TOPICAL 2 TIMES DAILY
Status: DISCONTINUED | OUTPATIENT
Start: 2022-01-11 | End: 2022-01-26 | Stop reason: HOSPADM

## 2022-01-11 RX ADMIN — METOPROLOL TARTRATE 25 MG: 25 TABLET, FILM COATED ORAL at 08:02

## 2022-01-11 RX ADMIN — CIPROFLOXACIN HYDROCHLORIDE 1 DROP: 3 SOLUTION/ DROPS OPHTHALMIC at 22:29

## 2022-01-11 RX ADMIN — GUAIFENESIN 600 MG: 600 TABLET, EXTENDED RELEASE ORAL at 22:28

## 2022-01-11 RX ADMIN — PANTOPRAZOLE SODIUM 40 MG: 40 TABLET, DELAYED RELEASE ORAL at 08:02

## 2022-01-11 RX ADMIN — DOXYCYCLINE 100 MG: 100 INJECTION, POWDER, LYOPHILIZED, FOR SOLUTION INTRAVENOUS at 23:26

## 2022-01-11 RX ADMIN — VANCOMYCIN HYDROCHLORIDE 750 MG: 750 INJECTION, SOLUTION INTRAVENOUS at 17:09

## 2022-01-11 RX ADMIN — DOXYCYCLINE 100 MG: 100 INJECTION, POWDER, LYOPHILIZED, FOR SOLUTION INTRAVENOUS at 08:05

## 2022-01-11 RX ADMIN — CEFTRIAXONE SODIUM 1000 MG: 10 INJECTION, POWDER, FOR SOLUTION INTRAVENOUS at 22:28

## 2022-01-11 RX ADMIN — Medication 1000 UNITS: at 08:02

## 2022-01-11 RX ADMIN — SODIUM CHLORIDE, SODIUM GLUCONATE, SODIUM ACETATE, POTASSIUM CHLORIDE, MAGNESIUM CHLORIDE, SODIUM PHOSPHATE, DIBASIC, AND POTASSIUM PHOSPHATE 100 ML/HR: .53; .5; .37; .037; .03; .012; .00082 INJECTION, SOLUTION INTRAVENOUS at 22:32

## 2022-01-11 RX ADMIN — METOPROLOL TARTRATE 25 MG: 25 TABLET, FILM COATED ORAL at 22:45

## 2022-01-11 RX ADMIN — APIXABAN 5 MG: 5 TABLET, FILM COATED ORAL at 08:02

## 2022-01-11 RX ADMIN — APIXABAN 5 MG: 5 TABLET, FILM COATED ORAL at 17:08

## 2022-01-11 RX ADMIN — CIPROFLOXACIN HYDROCHLORIDE 1 DROP: 3 SOLUTION/ DROPS OPHTHALMIC at 15:32

## 2022-01-11 RX ADMIN — PREDNISONE 5 MG: 5 TABLET ORAL at 08:02

## 2022-01-11 RX ADMIN — MICONAZOLE NITRATE: 20 CREAM TOPICAL at 22:29

## 2022-01-11 RX ADMIN — CLOBETASOL PROPIONATE: 0.5 CREAM TOPICAL at 17:15

## 2022-01-11 RX ADMIN — SODIUM CHLORIDE, SODIUM GLUCONATE, SODIUM ACETATE, POTASSIUM CHLORIDE, MAGNESIUM CHLORIDE, SODIUM PHOSPHATE, DIBASIC, AND POTASSIUM PHOSPHATE 100 ML/HR: .53; .5; .37; .037; .03; .012; .00082 INJECTION, SOLUTION INTRAVENOUS at 22:28

## 2022-01-11 RX ADMIN — HYDROXYCHLOROQUINE SULFATE 200 MG: 200 TABLET, FILM COATED ORAL at 17:08

## 2022-01-11 RX ADMIN — CLOTRIMAZOLE: 1 CREAM TOPICAL at 17:15

## 2022-01-11 NOTE — PHYSICAL THERAPY NOTE
Physical Therapy Evaluation    Patient Name: Sancho Fuller    TTRIG'A Date: 2022     Problem List  Principal Problem:    Sepsis ([facility] )  Active Problems:    Rheumatoid arthritis ([facility] )    GERD (gastroesophageal reflux disease)    Benign essential hypertension    Murmur, cardiac    AMANDA (acute kidney injury) ([facility] )    New onset a-fib ([facility] )    Lethargy       Past Medical History  Past Medical History:   Diagnosis Date    Abnormal thyroid function test     last assessed: 2015     Arthritis     Caries     last assessed: 2016     Edema of right lower extremity     last assessed: 2015     GERD (gastroesophageal reflux disease)     Hypertension     Sarcoid         Past Surgical History  Past Surgical History:   Procedure Laterality Date     SECTION      MULTIPLE TOOTH EXTRACTIONS N/A 2016    Procedure: Surgical extraction of teeth 2, 18, 19, 30, 31; incision and drainage of left subperiosteal abscess ;  Surgeon: Masha Farnsworth DMD;  [facility]: [facility];  Service:      Actual eval time: 8:15-8:40AM   22 1118   PT Last Visit   PT Visit Date 22   Note Type   Note type Evaluation   Pain Assessment   Pain Assessment Tool 0-10   Pain Score No Pain   Restrictions/Precautions   Weight Bearing Precautions Per Order No   Other Precautions Chair Alarm; Bed Alarm; Fall Risk;Telemetry;Multiple lines   Home Living   Type of [address] [place] [address] Two level;Bed/bath upstairs  (1 CH[facility]AYA, FFSU to 2nd floor bed/bath )   Bathroom Shower/Tub Walk-in shower   [facility]   Additional Comments pt reports ambulating with SPC PTA   Prior Function   Level of [place] Independent with ADLs and functional mobility   Lives With Daughter; Other (Comment)  ([facility])   Receives Help From Family   ADL Assistance Independent   IADLs Needs assistance   Falls in the last 6 months 0   Comments pt reports living with her family who work full time but can assist as needed when home   General   Family/Caregiver Present No   Cognition   Overall Cognitive Status WFL   Arousal/Participation Cooperative   Attention Within functional limits   Orientation Level Oriented X4   Following Commands Follows one step commands with increased time or repetition   RLE Assessment   RLE Assessment X   Strength RLE   RLE Overall Strength 3+/5   LLE Assessment   LLE Assessment X   Strength LLE   LLE Overall Strength 3+/5   Bed Mobility   Supine to Sit 3  Moderate assistance   Additional items Assist x 1;HOB elevated; Increased time required;Verbal cues;LE management   Sit to Supine 3  Moderate assistance   Additional items Assist x 1; Increased time required;Verbal cues;LE management;HOB elevated   Transfers   Sit to Stand 3  Moderate assistance   Additional items Assist x 2; Increased time required;Verbal cues   Stand to Sit 3  Moderate assistance   Additional items Assist x 2; Increased time required;Verbal cues   Additional Comments c RW; VC for hand placement and upright posture   Ambulation/Elevation   Gait pattern Improper Weight shift; Forward Flexion;Decreased foot clearance; Short stride; Step to;Excessively slow;Knees flexed  (crouched posture)   Gait Assistance 3  Moderate assist   Additional items Assist x 1;Verbal cues  (+2nd person for line management and safety)   Assistive Device Rolling walker   Distance 5' fwd/bwd  (limited by fatigue)   Balance   Static Sitting Fair   Dynamic Sitting Fair -   Static Standing Poor +   Dynamic Standing Poor   Ambulatory Poor   Endurance Deficit   Endurance Deficit Yes   Endurance Deficit Description fatigue, weakness   Activity Tolerance   Activity Tolerance Patient limited by fatigue   Medical Staff Made Aware OT, Raymond   Nurse Made Aware pt cleared to see and mobilize per nursing   Assessment   Prognosis Fair   Problem List Decreased strength;Decreased endurance; Impaired balance;Decreased mobility   Assessment Pt is a 66 y o  female admitted to [facility] on 1/10/2022 with generalized weakness, wet cough, and lethargy  Pt received a primary medical dx of sepsis  Pt has the following comorbidities which affect their treatment: new onset a-fib, cardiac murmer, AMANDA, RA,  as well as personal factors including FFSU to 2nd floor bed/bath  Pt has a high complexity clinical presentation due to Ongoing medical management for primary dx, Increased reliance on more restrictive AD compared to baseline, Decreased activity tolerance compared to baseline, Fall risk, Increased assistance needed from caregiver at current time, Ongoing telemetry monitoring, Trending lab values, Continuous pulse oximetry monitoring  , and PMH  PT was consulted to evaluate pt's functional mobility and discharge needs  Upon evaluation, patient required modAx1 for bed mobility, modAx2 for STS transfers, modAx1 (+2nd for safety) for ambulation  Pt's functional impairments include: decreased strength, balance, activity tolerance, endurance, mobility  At conclusion of eval, pt requesting to return to bed supine  Bed alarm placed, phone, call bell, and all other personal needs within reach  Pt would benefit from skilled PT to address their functional mobility limitations  The patient's AM-PAC Basic Mobility Inpatient Short Form Raw Score is 10  A Raw score of less than or equal to 16 suggests the patient may benefit from discharge to post-acute rehabilitation services  Please also refer to the recommendation of the Physical Therapist for safe discharge planning  D/C recommendations are rehab  Barriers to Discharge Decreased caregiver support; Inaccessible home environment   Goals   Patient Goals to get better   STG Expiration Date 01/25/22   Short Term Goal #1 In 14 days, pt will: 1) perform bed mobility with mod I to promote functional independence and decrease caregiver burden   2) perform transfers with mod I to promote functional independence and decrease caregiver burden  3) ambulate 48' with mod I and least restrictive device to promote safe return to home environment  4) negotiate 13 stairs with mod I to promote safe return to home environment when appropriate  5) improve balance grades by 1/2 to promote safety with functional mobility  6) improve strength grades by 1/2 to promote safety with functional mobility  PT Treatment Day 0   Plan   Treatment/Interventions Functional transfer training;LE strengthening/ROM; Therapeutic exercise;Elevations; Endurance training;Patient/family training;Equipment eval/education; Bed mobility;Gait training;Spoke to nursing;Spoke to case management;OT   PT Frequency 3-5x/wk   Recommendation   PT Discharge Recommendation Post acute rehabilitation services   Equipment Recommended [facility] Recommended Wheeled walker   Additional Comments pt cleared for d/c to rehab when medically cleared   [address] Mobility Inpatient   Turning in Bed Without Bedrails 2   Lying on Back to Sitting on Edge of Flat Bed 2   Moving Bed to Chair 2   Standing Up From Chair 1   Walk in Room 2   Climb 3-5 Stairs 1   Basic Mobility Inpatient Raw Score 10   Turning Head Towards Sound 4   Follow Simple Instructions 4   Low Function Basic Mobility Raw Score 18   Low Function Basic Mobility Standardized Score 29 25   Highest Level Of Mobility   -HLM Goal 4: Move to chair/commode   JH-HLM Highest Level of Mobility 5: Stand (1 or more minutes)   JH-HLM Goal Achieved Yes   Modified [place] Scale   Modified [place] Scale 4     Rizwan Miranda, PT

## 2022-01-11 NOTE — PLAN OF CARE
Problem: OCCUPATIONAL THERAPY ADULT  Goal: Performs self-care activities at highest level of function for planned discharge setting  See evaluation for individualized goals  Description: Treatment Interventions: ADL retraining,Functional transfer training,UE strengthening/ROM,Endurance training,Cognitive reorientation,Patient/family training,Equipment evaluation/education,Compensatory technique education,Energy conservation,Activityengagement  Equipment Recommended: Bedside commode       See flowsheet documentation for full assessment, interventions and recommendations  Note: Limitation: Decreased ADL status,Decreased UE strength,Decreased cognition,Decreased endurance,Decreased self-care trans,Decreased high-level ADLs,Decreased fine motor control  Prognosis: Good  Assessment: Pt is a 66 y o  female who was admitted to [facility] on 1/10/2022 with generalized weakness, decreased appetite; diagnosed with Sepsis ([address] ) and a-fib with RVR  Pt  has a past medical history of Abnormal thyroid function test, Arthritis, Caries, Edema of right lower extremity, GERD (gastroesophageal reflux disease), Hypertension, and Sarcoid  At baseline pt was completing ADLs IND, IADLs with assist, ambulating MOD IND with cane, (-) driving (van transportation)  Pt lives with her dtr and granddaughter in a 2  with 2nd floor bed/bath  Currently pt requires MIN-MOD A for overall ADLS and for functional mobility/transfers  Pt currently presents with impairments in the following categories -steps to enter environment, limited home support and difficulty performing ADLS activity tolerance, endurance, standing balance/tolerance, sitting balance/tolerance, UE strength and memory   These impairments, as well as pt's fatigue, pain, decreased caregiver support, risk for falls and home environment  limit pt's ability to safely engage in all baseline areas of occupation, includinggrooming, bathing, dressing, toileting, functional mobility/transfers, community mobility and leisure activities  From OT standpoint, recommend POST-ACUTE REHAB SERVICES upon D/C  OT will continue to follow to address the below stated goals        OT Discharge Recommendation: Post acute rehabilitation services

## 2022-01-11 NOTE — ASSESSMENT & PLAN NOTE
· Suspect from sepsis  · Head CT is unremarkable for acute pathology  · Neuro checks  · Restarted diet since the patient is more interactive

## 2022-01-11 NOTE — ASSESSMENT & PLAN NOTE
· Patient had vomiting a early today  Start on a diet and see if she tolerates it  Obtain a CT of the abdomen

## 2022-01-11 NOTE — ASSESSMENT & PLAN NOTE
· On presentation was in afib RVR with -120s  · After IV lopressor x1, converted to NSR  · Was started on heparin drip  · Seen by cardio -> cont lopressor 25 BID, switched to PO eliquis -> if unable to take PO may start heparin again  · F/u echo  · Will need eliquis price check

## 2022-01-11 NOTE — PLAN OF CARE
Problem: PHYSICAL THERAPY ADULT  Goal: Performs mobility at highest level of function for planned discharge setting  See evaluation for individualized goals  Description: Treatment/Interventions: Functional transfer training,LE strengthening/ROM,Therapeutic exercise,Elevations,Endurance training,Patient/family training,Equipment eval/education,Bed mobility,Gait training,Spoke to nursing,Spoke to case management,OT  Equipment Recommended: Gunjan Garcia       See flowsheet documentation for full assessment, interventions and recommendations  Note: Prognosis: Fair  Problem List: Decreased strength,Decreased endurance,Impaired balance,Decreased mobility  Assessment: Pt is a 66 y o  female admitted to [facility] on 1/10/2022 with generalized weakness, wet cough, and lethargy  Pt received a primary medical dx of sepsis  Pt has the following comorbidities which affect their treatment: new onset a-fib, cardiac murmer, AMANDA, RA,  as well as personal factors including FFSU to 2nd floor bed/bath  Pt has a high complexity clinical presentation due to Ongoing medical management for primary dx, Increased reliance on more restrictive AD compared to baseline, Decreased activity tolerance compared to baseline, Fall risk, Increased assistance needed from caregiver at current time, Ongoing telemetry monitoring, Trending lab values, Continuous pulse oximetry monitoring  , and PMH  PT was consulted to evaluate pt's functional mobility and discharge needs  Upon evaluation, patient required modAx1 for bed mobility, modAx2 for STS transfers, modAx1 (+2nd for safety) for ambulation  Pt's functional impairments include: decreased strength, balance, activity tolerance, endurance, mobility  At conclusion of eval, pt requesting to return to bed supine  Bed alarm placed, phone, call bell, and all other personal needs within reach  Pt would benefit from skilled PT to address their functional mobility limitations   The patient's AM-PAC Basic Mobility Inpatient Short Form Raw Score is 10  A Raw score of less than or equal to 16 suggests the patient may benefit from discharge to post-acute rehabilitation services  Please also refer to the recommendation of the Physical Therapist for safe discharge planning  D/C recommendations are rehab  Barriers to Discharge: Decreased caregiver support,Inaccessible home environment        PT Discharge Recommendation: Post acute rehabilitation services          See flowsheet documentation for full assessment

## 2022-01-11 NOTE — SPEECH THERAPY NOTE
Speech Language/Pathology    Speech-Language Pathology Bedside Swallow Evaluation      Patient Name: Robe Bean    UIQIH'L Date: 2022     Problem List  Principal Problem:    Sepsis ([facility] )  Active Problems:    [facility] ([facility] )    GERD (gastroesophageal reflux disease)    Benign essential hypertension    Murmur, cardiac    AMANDA (acute kidney injury) ([facility] )    New onset a-fib ([facility] )    Lethargy      Past Medical History  Past Medical History:   Diagnosis Date    Abnormal thyroid function test     last assessed: 2015     Arthritis     Caries     last assessed: 2016     Edema of right lower extremity     last assessed: 2015     GERD (gastroesophageal reflux disease)     Hypertension     Sarcoid        Past Surgical History  Past Surgical History:   Procedure Laterality Date     SECTION      MULTIPLE TOOTH EXTRACTIONS N/A 2016    Procedure: Surgical extraction of teeth 2, 18, 19, 30, 31; incision and drainage of left subperiosteal abscess ;  Surgeon: Alan Jurado DMD;  Location: [facility];  Service:        Summary   Pt presented with functional appearing oral and pharyngeal stage swallowing skills with materials administered today  No significant oral difficulties, swallows appear timely, no overt s/s aspiration across trials  Upon completion of all trials, pt w/ episode of vomiting  Of note, voicing weak and hoarse       Risk/s for Aspiration: Mild     Recommended Diet: defer to physician 2/2 n/v, no significant oropharyngeal difficulties warranting dysphagia diet modifications at this time   Recommended Form of Meds: As tolerated   Aspiration precautions and swallowing strategies: upright posture, only feed when fully alert, slow rate of feeding and small bites/sips  Other Recommendations: Continue frequent oral care, consider consult w/ GI if n/v continues and ENT if vocal quality does not improv        Current Medical Status  Pt is a 66 y o  female who presented to [facility] with a PMH of HTN, RA, who lives with her daughter was not into the hospital today she was noted to have generalized weakness since last 1 day, daughter did notice some wet cough since yesterday, did not notice patient having any shortness of breath, she does not think patient had any chest pain or abdominal pain, no nausea vomiting or diarrhea  No cardiac history    Current Precautions: Allergies:  No known food allergies    Past medical history:  Please see H&P for details    Special Studies:  CXR 1/10/22:   No acute cardiopulmonary disease  CT head 1/11/22: No acute intracranial abnormality        Paranasal sinus disease, as described above      CT chest 1/11/22: No source for sepsis identified in the chest      Fatty liver  Social/Education/Vocational Hx:  Pt lives with family    Swallow Information   Current Risks for Dysphagia & Aspiration: respiratory status   Current Symptoms/Concerns: wet cough  Current Diet: NPO   Baseline Diet: regular diet and thin liquids      Baseline Assessment   Behavior/Cognition: alert  Speech/Language Status: able to participate in conversation and able to follow commands  Patient Positioning: upright in bed  Pain Status/Interventions/Response to Interventions:  No report of or nonverbal indications of pain  Swallow Mechanism Exam  Facial: symmetrical  Labial: WFL  Lingual: WFL  Velum: symmetrical  Mandible: adequate ROM  Dentition: adequate  Vocal quality:weak and hoarse   Volitional Cough: strong/productive   Respiratory Status: on 2L O2  Consistencies Assessed and Performance   Consistencies Administered: thin liquids, nectar thick, puree, soft solids and hard solids    Oral Stage: WFL  Mastication was adequate with the materials administered today  Bolus formation and transfer were functional with no significant oral residue noted  No overt s/s reduced oral control      Pharyngeal Stage: [facility]  Swallow Mechanics:  Swallowing initiation appeared prompt  Laryngeal rise was palpated and judged to be within functional limits  No coughing, throat clearing, change in vocal quality or respiratory status noted today  Esophageal Concerns: vomiting upon completion of PO trials    Strategies and Efficacy: -     Summary and Recommendations (see above)    Results Reviewed with: patient, RN and MD     Treatment Recommended: Brief x1-2 for diet tolerance and monitoring of voicing/potential impact on swallow skill     Patient Stated Goal: so thirsty     Dysphagia LTG  -Patient will demonstrate safe and effective oral intake (without overt s/s significant oral/pharyngeal dysphagia including s/s penetration or aspiration) for the highest appropriate diet level         Speech Therapy Prognosis   Prognosis: good    Prognosis Considerations: age, medical status and prior medical history

## 2022-01-11 NOTE — ASSESSMENT & PLAN NOTE
· Patient noted to have diffuse rash on her skin  Skin is dry and peeling  · No definite diagnosis    Has been going on for the past 5 years started after being on IV antibiotics for a dental infection  · Family reported that the diagnosis of psoriasis was ruled out since the patient did not get better while on Humira  · Consult dermatology

## 2022-01-11 NOTE — ASSESSMENT & PLAN NOTE
· Evolving since admission  · Patient with fever and tachycardia  · Appears weak, lethargic, + cough  · Send Blood cultures, check UA, CXR negative - chest CT is negative for any acute pathology  · COVID/flu/RSV negative  · procalcitonin 0 33-trending procalcitonin  · 1/2 sets of blood culture came back positive for Gram-positive cocci in cultures-and vancomycin  · Follow-up on final culture    May need Infectious Disease evaluation if it is a true infection  · Continue with the antibiotics and recheck procalcitonin

## 2022-01-11 NOTE — ASSESSMENT & PLAN NOTE
· Evolving since admission  · Initially Tachycardia, with leucocytosis, now with rising temp  · Appears weak, lethargic, + cough  · Send Blood cultures, check UA, CXR negative - will check CT chest  · COVID/flu/RSV negative  · procalcitonin 0 33  · Will start on empiric Rocephin, Doxycycline

## 2022-01-11 NOTE — ASSESSMENT & PLAN NOTE
· BL Cr 0 8-0 9  · Today 1 1  · 2/2 possible infection, poor PO intake  · Continue wit current IV fluids

## 2022-01-11 NOTE — UTILIZATION REVIEW
Initial Clinical Review    Admission: Date/Time/Statement:   Admission Orders (From admission, onward)     Ordered        01/10/22 1216  Inpatient Admission  Once                      Orders Placed This Encounter   Procedures    Inpatient Admission     Standing Status:   Standing     Number of Occurrences:   1     Order Specific Question:   Level of Care     Answer:   Level 2 Stepdown / HOT     Order Specific Question:   Estimated length of stay     Answer:   More than 2 Midnights     Order Specific Question:   Certification     Answer:   I certify that inpatient services are medically necessary for this patient for a duration of greater than two midnights  See H&P and MD Progress Notes for additional information about the patient's course of treatment  ED Arrival Information     Expected Arrival Acuity    - 1/10/2022 10:10 Urgent         Means of arrival Escorted by Service Admission type    Ambulance [facility] EMS Hospitalist Urgent         Arrival complaint    weakness        Chief Complaint   Patient presents with    Weakness - Generalized     pt reports generalized weakness since sunday  No SOB, urinary sxs, just overall feels weak  Initial Presentation: 66 y o  female who presented by EMS to [address] [place] [address] ED  Inpatient admission for evaluation and treatment of new onset atrial fibrillation w/ RVR, sepsis, AMANDA  PMHx: Arthritis, Edema of right lower extremity, GERD, Hypertension, and RA  Presented w/ generalized weakness and cough over the past day  On exam, ill-appearing, congested, irregular rate & rhythm, poor pulmonary effort  Troponin elevated  TSH elevated  EKG afib w/ RVR, -120s  Rate controlled w/ lopressor in ED  COVID/flu/RSV negative  Plan heparin gtt, echo, IV ABX, IVF, neuro checks  Trend labs, replete electrolytes as needed  Cardiology consulted      01/10/22 Cardiology Consult: Pt w/ new onset afib w/ RVR possibly triggered by poor PO intake over past few days  [id number] score is 3  Plan: start metoprolol BID, echo, start Eliquis  Date: 01/11/22  Day 2  Cardiology: Pt reports improvement from yesterday  Fever of 101 overnight, resolved now  Telemetry NSR  Plan: continue metoprolol & eliquis  Internal Medicine: Pt more alert and interactive; daughter reports improvement as well  Continues with episodes of vomiting after starting oral diet  Continues w/ cough and congestion  On exam, decreased breath sounds, rash generalized  Blood cultures 1 set positive  Plan: IV ABX, continue neuro checks, IVF        ED Triage Vitals   Temperature Pulse Respirations Blood Pressure SpO2   01/10/22 1013 01/10/22 1013 01/10/22 1013 01/10/22 1013 01/10/22 1013   98 6 °F (37 °C) (!) 129 20 141/76 94 %      Temp Source Heart Rate Source Patient Position - Orthostatic VS BP Location FiO2 (%)   01/10/22 1013 01/10/22 1013 01/10/22 1013 01/10/22 1013 --   Oral Monitor Lying Right arm       Pain Score       01/10/22 1100       No Pain          Wt Readings from Last 1 Encounters:   01/10/22 116 kg (255 lb)     Additional Vital Signs:   Date/Time Temp Pulse Resp BP MAP (mmHg) SpO2 Calculated FIO2 (%) - Nasal Cannula Nasal Cannula O2 Flow Rate  O2 Device   01/11/22 14:43:45 98 8 °F (37 1 °C) -- 18 -- -- -- -- -- --   01/11/22 07:03:04 97 9 °F (36 6 °C) 84 20 111/54 73 100 % -- -- --   01/11/22 02:18:39 98 9 °F (37 2 °C) -- -- 107/54 72 -- -- -- --   01/10/22 22:00:35 100 3 °F (37 9 °C) 84 20 99/53 68 94 % -- -- --   01/10/22 2109 101 1 °F (38 4 °C)   Abnormal  -- -- -- -- -- -- -- --   01/10/22 2033 -- -- -- -- -- -- 28 2 L/min Nasal cannula   01/10/22 19:57:24 -- -- -- 98/53 68 -- -- -- --   01/10/22 17:50:51 100 3 °F (37 9 °C) -- 18 129/53 78 -- -- -- --   01/10/22 1700 -- 100 30 Abnormal  110/56 80 95 % -- -- --   01/10/22 1600 -- 98 30 Abnormal  109/53 77 96 % -- -- --   01/10/22 1500 -- 96 -- 114/54 78 93 % -- -- --   01/10/22 1300 -- 98 20 137/60 86 98 % 28 2 L/min Nasal cannula   01/10/22 1115 -- -- -- -- -- -- -- -- Nasal cannula   01/10/22 1100 -- 96 20 137/60 -- 98 % 28 2 L/min Nasal cannula     Pertinent Labs/Diagnostic Test Results:   1/10 - CXR  No acute cardiopulmonary disease  1/10 EKG  Atrial fibrillation with rapid ventricular response with premature ventricular or aberrantly conducted complexes    1/10 - Repeat EKG  Normal sinus rhythm    1/10 - Echo    Left Ventricle: Left ventricular cavity size is normal  Systolic function is normal  Wall motion is normal  Diastolic function is mildly abnormal, consistent with grade I (abnormal) relaxation  Wall thickness is mildly increased  There is mild concentric hypertrophy   Left Atrium: The atrium is mildly dilated   Mitral Valve: There is moderate annular calcification  There is trace regurgitation  1/11 - CT Head  No acute intracranial abnormality  Paranasal sinus disease    1/11 - CT chest  No source for sepsis identified in the chest   Fatty liver      Results from last 7 days   Lab Units 01/10/22  2048 01/10/22  1031   SARS-COV-2  Negative Negative     Results from last 7 days   Lab Units 01/11/22  0506 01/10/22  1207   WBC Thousand/uL 8 95 11 15*   HEMOGLOBIN g/dL 14 2 15 3   HEMATOCRIT % 45 5 49 2*   PLATELETS Thousands/uL 194 225   NEUTROS ABS Thousands/µL 7 01 8 41*     Results from last 7 days   Lab Units 01/11/22  0506 01/10/22  1031   SODIUM mmol/L 141 138   POTASSIUM mmol/L 4 2 4 5   CHLORIDE mmol/L 109* 106   CO2 mmol/L 22 22   ANION GAP mmol/L 10 10   BUN mg/dL 22 17   CREATININE mg/dL 1 13 1 29   EGFR ml/min/1 73sq m 48 41   CALCIUM mg/dL 9 2 9 5   MAGNESIUM mg/dL  --  2 0     Results from last 7 days   Lab Units 01/11/22  0506 01/10/22  2023   AST U/L 39  --    ALT U/L 27  --    ALK PHOS U/L 69  --    TOTAL PROTEIN g/dL 7 4  --    ALBUMIN g/dL 2 8*  --    TOTAL BILIRUBIN mg/dL 0 82  --    AMMONIA umol/L  --  26     Results from last 7 days   Lab Units 01/11/22  0506 01/10/22  1031 GLUCOSE RANDOM mg/dL 71 78     Results from last 7 days   Lab Units 01/10/22  2023   PH JORDAN  7 368   PCO2 JORDAN mm Hg 41 4*   PO2 JORDAN mm Hg 47 6*   HCO3 JORDAN mmol/L 23 3*   BASE EXC JORDAN mmol/L -1 9   O2 CONTENT JORDAN ml/dL 17 3   O2 HGB, VENOUS % 80 7*     Results from last 7 days   Lab Units 01/10/22  1522 01/10/22  1302 01/10/22  1043   HS TNI 0HR ng/L  --   --  77*   HS TNI 2HR ng/L  --  104*  --    HSTNI D2 ng/L  --  27  --    HS TNI 4HR ng/L 87*  --   --    HSTNI D4 ng/L 10  --   --      Results from last 7 days   Lab Units 01/10/22  1752 01/10/22  1043   PROTIME seconds  --  14 1   INR   --  1 14   PTT seconds 38* 27     Results from last 7 days   Lab Units 01/10/22  1031   TSH 3RD GENERATON uIU/mL 6 420*     Results from last 7 days   Lab Units 01/11/22  0506 01/10/22  1642   PROCALCITONIN ng/ml 0 46* 0 33*     Results from last 7 days   Lab Units 01/10/22  2025   LACTIC ACID mmol/L 2 0     Results from last 7 days   Lab Units 01/10/22  1031   NT-PRO BNP pg/mL 517*     Results from last 7 days   Lab Units 01/10/22  1936   CLARITY UA  Clear   COLOR UA  Orange   SPEC GRAV UA  >=1 030   PH UA  5 5   GLUCOSE UA mg/dl Negative   KETONES UA mg/dl Trace*   BLOOD UA  Negative   PROTEIN UA mg/dl Trace*   NITRITE UA  Negative   BILIRUBIN UA  Interference- unable to analyze*   UROBILINOGEN UA E U /dl 1 0   LEUKOCYTES UA  Trace*   WBC UA /hpf 2-4   RBC UA /hpf 4-10*   BACTERIA UA /hpf None Seen   EPITHELIAL CELLS WET PREP /hpf None Seen     Results from last 7 days   Lab Units 01/10/22  2048 01/10/22  1031   INFLUENZA A PCR  Negative Negative   INFLUENZA B PCR  Negative Negative   RSV PCR  Negative Negative     Results from last 7 days   Lab Units 01/10/22  2025 01/10/22  1849   BLOOD CULTURE   --  No Growth at 24 hrs     GRAM STAIN RESULT  Gram positive cocci in clusters*  --        ED Treatment:   Medication Administration from 01/10/2022 1010 to 01/10/2022 1736       Date/Time Order Dose Route Action     01/10/2022 1035 metoprolol (LOPRESSOR) injection 5 mg 5 mg Intravenous Given     01/10/2022 1235 metoprolol tartrate (LOPRESSOR) tablet 50 mg 50 mg Oral Given     01/10/2022 1235 heparin (porcine) 25,000 units in 0 45% NaCl 250 mL infusion (premix) 11 1 Units/kg/hr Intravenous New Bag     01/10/2022 1638 multi-electrolyte (PLASMALYTE-A/ISOLYTE-S PH 7 4) IV solution 50 mL/hr Intravenous New Bag     01/10/2022 1530 perflutren lipid microsphere (DEFINITY) injection 0 6 mL/min Intravenous Given        Past Medical History:   Diagnosis Date    Abnormal thyroid function test     last assessed: Sept 25, 2015     Arthritis     Caries     last assessed: Sept 9, 2016     Edema of right lower extremity     last assessed: March 18, 2015     GERD (gastroesophageal reflux disease)     Hypertension     Sarcoid      Present on Admission:   Rheumatoid arthritis ([facility] )   GERD (gastroesophageal reflux disease)   Benign essential hypertension   Murmur, cardiac      Admitting Diagnosis: Weakness [R53 1]  Atrial fibrillation, new onset ([facility] ) [I48 91]  Age/Sex: 66 y o  female  Admission Orders:  Telemetry  NPO  --> Dysphagia Mechanical Soft Diet  Continuous pulse ox   DW   I&O   SCDs  Neuro checks q4h      Scheduled Medications:  apixaban, 5 mg, Oral, BID  cefTRIAXone, 1,000 mg, Intravenous, Q24H  cholecalciferol, 1,000 Units, Oral, Daily  ciprofloxacin, 1 drop, Both Eyes, Q4H While Awake  clobetasol, , Topical, BID  clotrimazole, , Topical, BID  doxycycline, 100 mg, Intravenous, Q12H  hydroxychloroquine, 200 mg, Oral, BID  metoprolol tartrate, 25 mg, Oral, Q12H FAMILIA  pantoprazole, 40 mg, Oral, Early Morning  predniSONE, 5 mg, Oral, Daily    Continuous IV Infusions:  heparin, 3-20 units/kg/hr, Intravenous, Continuous, Titratable; Start: 01/10/22 1145 End: 01/10/22 1752  multi-electrolyte, 50 mL/hr, Intravenous, Continuous    PRN Meds:  acetaminophen, 650 mg, Oral, Q4H PRN; 1/10 x1  metoprolol, 5 mg, Intravenous, Q6H PRN  sodium chloride (PF), 3 mL, Intravenous, Q1H PRN        IP CONSULT TO CARDIOLOGY    Network Utilization Review Department  ATTENTION: Please call with any questions or concerns to [phone] and carefully listen to the prompts so that you are directed to the right person  All voicemails are confidential   Harlene Pair all requests for admission clinical reviews, approved or denied determinations and any other requests to dedicated fax number below belonging to the campus where the patient is receiving treatment   List of dedicated fax numbers for the Facilities:  [address] DENIALS (Administrative/Medical Necessity) [phone]   [address] (Maternity/NICU/Pediatrics) [phone]   [address]  [address] Medical [place] [address] [id number] [address] [facility] [place] [address] [phone]

## 2022-01-11 NOTE — ASSESSMENT & PLAN NOTE
· On presentation was in afib RVR with -120s  · After IV lopressor x1, converted to NSR  · Seen by cardio -> cont lopressor 25 BID, switched to PO eliquis -> if unable to take PO may start heparin again  · Echocardiogram reviewed  · Will need eliquis price check at the time of discharge

## 2022-01-11 NOTE — WOUND OSTOMY CARE
Consult Note - Wound   Sancho Fuller 66 y o  female MRN: [id number]  Unit/Bed#: [facility] 531-01 Encounter: [id number]    History and Present Illness:  Patient admitted with sepsis  Wound care consulted for patient with sacral slit and skin tears  Patient history significant for rheumatoid arthritis, HTN, new onset a-fib, sarcoid, morbid obesity with a BMI of 43 77, and rash from antibiotic in the past per daughter  Assessment Findings:   Patient in bed for assessment  Daughter at the bedside  Patient speaks in a soft whisper, but is alert and oriented, cooperative with assessment  She is a max assist X 2 to roll for assessment  Wet incontinence pad was removed from beneath patient, and she is documented as incontinent of stool  Patient's skin is generalized dry and scaly  1  Bilateral heels intact, extremely dry and scaly  2  Breast folds, lower abdominal fold, sacral slit, right lateral thigh, and upper abdominal fold beefy red scattered areas of superficial partial thickness wounds with mixed etiology  Generalized scaly dry skin with scattered epidermal rash like appearance    Communicated to Dr Cindy Knutson via tiger text to request a dermatology consult, and to request Manjeet cream for sacral wound    Skin and Wound Care Plan:   1  Elevate heels off of bed with pillows to offload  2  Ehob offloading cushion to chair when OOB  3  Apply hydraguard to b/l heels  4  Cleanse skin folds to abdomen, breast and mid abdomen with soap and water daily, pat dry, place Maxorb rope in areas of partial thickness open areas in the skin folds, change daily or PRN with soilage or dislodgement  5  Cleanse sacrum with Remedy foaming cleanser BID, apply Manjeet cream to the sacral slit wound and periwound area  6   Allevyn foam dressing on the right lateral thigh wounds, valentina with T, date, change every other day and PRN with soilage or dislodgement    Wounds:  Wound 01/10/22 Sacrum (Active)   Wound Image   01/11/22 1357   Wound Description Beefy red 01/11/22 1357   Maia-wound Assessment Dry 01/11/22 1357   Wound Length (cm) 5 cm 01/11/22 1357   Wound Width (cm) 1 cm 01/11/22 1357   Wound Depth (cm) 0 1 cm 01/11/22 1357   Wound Surface Area (cm^2) 5 cm^2 01/11/22 1357   Wound Volume (cm^3) 0 5 cm^3 01/11/22 1357   Calculated Wound Volume (cm^3) 0 5 cm^3 01/11/22 1357   Drainage Amount None 01/11/22 1357   Non-staged Wound Description Partial thickness 01/11/22 1357   Treatments Cleansed 01/11/22 1357   Dressing Protective barrier 01/11/22 1357   Patient Tolerance Tolerated well 01/11/22 1357       Wound 01/10/22 Thigh Anterior;Left;Proximal (Active)   Wound Image   01/11/22 1355   Wound Description Pink 01/11/22 1537   Maia-wound Assessment Dry 01/11/22 1537   Wound Length (cm) 1 cm 01/11/22 1355   Wound Width (cm) 1 5 cm 01/11/22 1355   Wound Depth (cm) 0 1 cm 01/11/22 1355   Wound Surface Area (cm^2) 1 5 cm^2 01/11/22 1355   Wound Volume (cm^3) 0 15 cm^3 01/11/22 1355   Calculated Wound Volume (cm^3) 0 15 cm^3 01/11/22 1355   Drainage Amount None 01/11/22 1355   Non-staged Wound Description Partial thickness 01/11/22 1355   Treatments Cleansed 01/11/22 1355   Dressing Protective barrier 01/11/22 1537   Patient Tolerance Tolerated well 01/11/22 1355       Wound 01/11/22 Thigh Posterior;Right (Active)   Wound Image   01/11/22 1359   Wound Description Beefy red;Yellow 01/11/22 1359   Maia-wound Assessment Dry 01/11/22 1359   Wound Length (cm) 6 cm 01/11/22 1359   Wound Width (cm) 9 cm 01/11/22 1359   Wound Depth (cm) 0 1 cm 01/11/22 1359   Wound Surface Area (cm^2) 54 cm^2 01/11/22 1359   Wound Volume (cm^3) 5 4 cm^3 01/11/22 1359   Calculated Wound Volume (cm^3) 5 4 cm^3 01/11/22 1359   Drainage Amount None 01/11/22 1359   Non-staged Wound Description Partial thickness 01/11/22 1359   Treatments Cleansed 01/11/22 1359   Dressing Other (Comment) 01/11/22 1359   Patient Tolerance Tolerated well 01/11/22 1359       Wound 01/11/22 Pelvis Anterior;Right (Active)   Wound Image   01/11/22 1401   Wound Description Beefy red 01/11/22 1401   Maia-wound Assessment Dry 01/11/22 1401   Wound Length (cm) 1 cm 01/11/22 1401   Wound Width (cm) 6 cm 01/11/22 1401   Wound Depth (cm) 0 1 cm 01/11/22 1401   Wound Surface Area (cm^2) 6 cm^2 01/11/22 1401   Wound Volume (cm^3) 0 6 cm^3 01/11/22 1401   Calculated Wound Volume (cm^3) 0 6 cm^3 01/11/22 1401   Drainage Amount None 01/11/22 1401   Non-staged Wound Description Partial thickness 01/11/22 1401   Treatments Cleansed 01/11/22 1401   Dressing Other (Comment) 01/11/22 1401       Wound 01/11/22 Abdomen Left;Upper (Active)   Wound Image   01/11/22 1402   Wound Description Beefy red 01/11/22 1402   Maia-wound Assessment Dry 01/11/22 1402   Wound Length (cm) 0 5 cm 01/11/22 1402   Wound Width (cm) 3 5 cm 01/11/22 1402   Wound Depth (cm) 0 1 cm 01/11/22 1402   Wound Surface Area (cm^2) 1 75 cm^2 01/11/22 1402   Wound Volume (cm^3) 0 175 cm^3 01/11/22 1402   Calculated Wound Volume (cm^3) 0 18 cm^3 01/11/22 1402   Drainage Amount None 01/11/22 1402   Non-staged Wound Description Partial thickness 01/11/22 1402   Dressing Other (Comment) 01/11/22 1402   Patient Tolerance Tolerated well 01/11/22 1402     Reviewed plan of care with primary RN  Recommendations written as orders  Wound care team to follow weekly while admitted  Questions or concerns [phone] [facility] [[email or web address]] Nurse    Katlyn BALLARDN, RN, [facility]

## 2022-01-11 NOTE — ASSESSMENT & PLAN NOTE
· BL Cr 0 8-0 9  · Today 1 2  · 2/2 possible infection, poor PO intake  · Will place on slow IVF @ 50/hr

## 2022-01-11 NOTE — PROGRESS NOTES
Cardiology Team 2 Progress Note - Emir Griffin 66 y o  female MRN: [id number]    Unit/Bed#: [facility] 531-01 Encounter: [id number]          Subjective:   Patient seen and examined  No significant events overnight  Denies lightheadedness, dizziness, syncope, headache, vision changes, diaphoresis, chest pain, palpitations, shortness of breath, PND, orthopnea, nausea, vomiting, abdominal pain or lower extremity edema  Pt reports she feels significantly better than yesterday, denies chest pain, SOB, lightheadedness  Hospital Course:   Emir Griffin is a 66y o  year old female with a history of HTN, RA, Sarcoid, GERD, hyperparathyroidism admitted yesterday after 2 days of fatigue, general malaise  Met sepsis criteria and was found to be in Afib with RVR, converted to NSR after a single dose of 5mg IV metoprolol  Started on metoprolol 25mg BID and eliquis 5mg BID, given [id number] score of 3  Vitals: Blood pressure 111/54, pulse 84, temperature 97 9 °F (36 6 °C), temperature source Oral, resp  rate 20, height 5' 4" (1 626 m), weight 116 kg (255 lb), SpO2 100 %  , Body mass index is 43 77 kg/m² ,   Orthostatic Blood Pressures      Most Recent Value   Blood Pressure 111/54 filed at 01/11/2022 0703   Patient Position - Orthostatic VS Lying filed at 01/11/2022 0703            Intake/Output Summary (Last 24 hours) at 1/11/2022 1003  Last data filed at 1/11/2022 0901  Gross per 24 hour   Intake 765 83 ml   Output 100 ml   Net 665 83 ml       Review of System:  Review of system was conducted and was negative except for as stated in the subjective course      Physical Exam:    GEN: Emir Griffin appears well, alert and oriented x 3, pleasant and cooperative   HEENT:  Normocephalic, atraumatic, anicteric, moist mucous membranes  NECK: No JVD or carotid bruits   HEART: regular rhythm, regular rate, normal S1 and S2, no murmurs, clicks, gallops or rubs   LUNGS: Clear to auscultation bilaterally; no wheezes, rales, or rhonchi; respiration nonlabored   ABDOMEN:  Normoactive bowel sounds, soft, no tenderness, no distention  EXTREMITIES: peripheral pulses palpable; no edema  NEURO: no gross focal findings; cranial nerves grossly intact   SKIN:  Dry, intact, warm to touch      Current Facility-Administered Medications:     acetaminophen (TYLENOL) tablet 650 mg, 650 mg, Oral, Q4H PRN, Hermes Foy MD, 650 mg at 01/10/22 2117    apixaban (ELIQUIS) tablet 5 mg, 5 mg, Oral, BID, Scarlett Mota DO, 5 mg at 01/11/22 0802    cefTRIAXone (ROCEPHIN) 1,000 mg in dextrose 5 % 50 mL IVPB, 1,000 mg, Intravenous, Q24H, Marzena Mendez MD, Last Rate: 100 mL/hr at 01/10/22 2213, 1,000 mg at 01/10/22 2213    cholecalciferol (VITAMIN D3) tablet 1,000 Units, 1,000 Units, Oral, Daily, Hermes Foy MD, 1,000 Units at 01/11/22 0802    clobetasol (TEMOVATE) 0 05 % cream, , Topical, BID, Marzena Mendez MD    clotrimazole (LOTRIMIN) 1 % cream, , Topical, BID, Marzena Mendez MD    doxycycline (VIBRAMYCIN) 100 mg in sodium chloride 0 9 % 100 mL IVPB, 100 mg, Intravenous, Q12H, Marzena Mendez MD, Last Rate: 100 mL/hr at 01/11/22 0805, 100 mg at 01/11/22 0805    hydroxychloroquine (PLAQUENIL) tablet 200 mg, 200 mg, Oral, BID, Marzena Mendez MD    metoprolol (LOPRESSOR) injection 5 mg, 5 mg, Intravenous, Q6H PRN, Hermes Foy MD    metoprolol tartrate (LOPRESSOR) tablet 25 mg, 25 mg, Oral, Q12H [address], Scarlett DO Nakul, 25 mg at 01/11/22 0802    multi-electrolyte (PLASMALYTE-A/ISOLYTE-S PH 7 4) IV solution, 50 mL/hr, Intravenous, Continuous, Marzena Mednez MD, Last Rate: 50 mL/hr at 01/10/22 1929, 50 mL/hr at 01/10/22 1929    pantoprazole (PROTONIX) EC tablet 40 mg, 40 mg, Oral, Early Morning, Marzena Mendez MD, 40 mg at 01/11/22 0802    predniSONE tablet 5 mg, 5 mg, Oral, Daily, Marzena Mendez MD, 5 mg at 01/11/22 0802    Insert peripheral IV, , , Once **AND** sodium chloride (PF) 0 9 % injection 3 mL, 3 mL, Intravenous, Q1H PRN, Tay Wallis MD    Labs & Results:      Results from last 7 days   Lab Units 01/10/22  1031   NT-PRO BNP pg/mL 517*     Results from last 7 days   Lab Units 01/11/22  0506 01/10/22  1031   POTASSIUM mmol/L 4 2 4 5   CO2 mmol/L 22 22   CHLORIDE mmol/L 109* 106   BUN mg/dL 22 17   CREATININE mg/dL 1 13 1 29     Results from last 7 days   Lab Units 01/11/22  0506 01/10/22  1207   HEMOGLOBIN g/dL 14 2 15 3   HEMATOCRIT % 45 5 49 2*   PLATELETS Thousands/uL 194 225           Telemetry:   Personally reviewed by DO Shawanda: No events overnight     Imaging:   CT head wo contrast   Final Result by Homer Heard MD (01/11 0407)      No acute intracranial abnormality  Paranasal sinus disease, as described above  Workstation performed: [id number]         CT chest wo contrast   Final Result by Edward Story MD (01/11 0324)      No source for sepsis identified in the chest       Fatty liver  Workstation performed: [id number]         X-ray chest 1 view portable   ED Interpretation by Darcy Etienne DO (01/10 1110)   Chest x-ray interpreted me shows no acute cardiopulmonary disease, No change from August 23, 2016      Final Result by Patrice Horton MD (01/10 1159)      No acute cardiopulmonary disease  Findings concur with the preliminary report by the referring clinician already in PACS and/or our electronic record EPIC  Workstation performed: [id number]                 VTE Prophylaxis: Reason for no pharmacologic prophylaxis on eliquis for Afib       Assessment:  Principal Problem:    Sepsis ([facility] )  Active Problems:    Rheumatoid arthritis ([address] )    GERD (gastroesophageal reflux disease)    Benign essential hypertension    Murmur, cardiac    AMANDA (acute kidney injury) ([facility] )    New onset a-fib (HCC)    Lethargy        1   New onset Afib with RVR      Plan:  -in normal sinus rhythm on exam this AM  -continue metoprolol tartrate 25mg PO BID  -continue anticoagulation with Eliquis 5mg BID given  [id number] score of 3 (+1 female, +1 age 74-69, +1 history of HTN), and no other bleeding risks  -Echo done in ED, official read pending       Case discussed and reviewed with Dr Daylin Reinoso who agrees with my assessment and plan  Thank you for involving us in the care of your patient  Osito Malcolm, DO  Family Medicine PGY-1      Epic/ Allscripts/Care Everywhere records reviewed:     Please Note: Fluency DirectDictation voice to text software may have been used in the creation of this document

## 2022-01-11 NOTE — PROGRESS NOTES
Vancomycin Assessment    Bhavna Florence is a 66 y o  female who is currently receiving vancomycin  Relevant clinical data and objective history reviewed:  Creatinine   Date Value Ref Range Status   01/11/2022 1 13 0 60 - 1 30 mg/dL Final     Comment:     Standardized to IDMS reference method   01/10/2022 1 29 0 60 - 1 30 mg/dL Final     Comment:     Standardized to IDMS reference method   11/04/2020 0 94 0 60 - 1 30 mg/dL Final     Comment:     Standardized to IDMS reference method     BP (!) 118/49   Pulse 84   Temp 98 8 °F (37 1 °C)   Resp 18   Ht 5' 4" (1 626 m)   Wt 116 kg (255 lb)   SpO2 100%   BMI 43 77 kg/m²   I/O last 3 completed shifts: In: 525 8 [I V :525 8]  Out: 100 [Urine:100]  Lab Results   Component Value Date/Time    BUN 22 01/11/2022 05:06 AM    WBC 8 95 01/11/2022 05:06 AM    HGB 14 2 01/11/2022 05:06 AM    HCT 45 5 01/11/2022 05:06 AM    MCV 88 01/11/2022 05:06 AM     01/11/2022 05:06 AM     Temp Readings from Last 3 Encounters:   01/11/22 98 8 °F (37 1 °C)   12/02/21 98 1 °F (36 7 °C)   11/11/20 98 7 °F (37 1 °C)     Vancomycin Days of Therapy: 1    Assessment/Plan  The patient is currently on vancomycin utilizing scheduled dosing based on adjusted body weight (due to obesity)  Baseline risks associated with therapy include: pre-existing renal impairment and advanced age  The patient is currently starting dose of vancomycin to be 750mg q 12h  Pharmacy will also follow closely for s/sx of nephrotoxicity, infusion reactions, and appropriateness of therapy  BMP and CBC will be ordered per protocol  Plan for trough as patient approaches steady state, prior to the 3rd  dose at approximately 1530 on 1/12/22  Due to infection severity, will target a trough of 15-20 (appropriate for most indications)   Pharmacy will continue to follow the patients culture results and clinical progress daily      Donta Sherman, Pharmacist

## 2022-01-11 NOTE — OCCUPATIONAL THERAPY NOTE
Occupational Therapy Evaluation     Patient Name: Everett Erickson  FMYTT'Q Date: 2022  Problem List  Principal Problem:    Sepsis ([facility] )  Active Problems:    Rheumatoid arthritis ([facility] )    GERD (gastroesophageal reflux disease)    Benign essential hypertension    Murmur, cardiac    AMANDA (acute kidney injury) ([facility] )    New onset a-fib ([facility] )    Lethargy    Past Medical History  Past Medical History:   Diagnosis Date    Abnormal thyroid function test     last assessed: 2015     Arthritis     Caries     last assessed: 2016     Edema of right lower extremity     last assessed: 2015     GERD (gastroesophageal reflux disease)     Hypertension     Sarcoid      Past Surgical History  Past Surgical History:   Procedure Laterality Date     SECTION      MULTIPLE TOOTH EXTRACTIONS N/A 2016    Procedure: Surgical extraction of teeth 2, 18, 19, 30, 31; incision and drainage of left subperiosteal abscess ;  Surgeon: Georges Perdue DMD;  Location:  MAIN OR;  Service:            22 0831   OT Last Visit   OT Visit Date 22   Note Type   Note type Evaluation   Restrictions/Precautions   Weight Bearing Precautions Per Order No   Other Precautions Chair Alarm; Bed Alarm; Fall Risk;Multiple lines   Pain Assessment   Pain Assessment Tool 0-10   Pain Score No Pain   Home Living   Type of Home House   Home Layout Two level;Bed/bath upstairs  (1 tall CHHAYA; no FFSU available)   Bathroom Shower/Tub Walk-in shower   Bathroom Toilet Standard   Bathroom Equipment   (none per pt)   Home Equipment Cane   Prior Function   Level of [place] Independent with ADLs and functional mobility   Lives With Daughter  (& adult granddaughter)   Receives Help From Family   ADL Assistance Independent   IADLs Needs assistance   Falls in the last 6 months 0   Comments Pt reports her dtr and granddaughter work during the day; are able to assist when home as needed   Lifestyle   Autonomy At baseline pt was completing ADLs IND, IADLs with assist, ambulating MOD IND with cane, (-) driving (van transportation)  Reciprocal Relationships supportive family   Service to Others retired   Intrinsic Gratification enjoys watching TV   Psychosocial   Psychosocial (WDL) [address]  5  Supervision/Setup   Grooming Assistance 5  Supervision/Setup   [address] 4  Minimal Assistance   [facility] [address] 2  Maximal Assistance   [address] 4  Minimal Assistance    [facility] 2  Maximal [address]  2  Maximal Assistance   Bed Mobility   Supine to Sit 3  Moderate assistance   Additional items Assist x 1; Increased time required;Verbal cues;LE management;HOB elevated   Transfers   Sit to Stand 3  Moderate assistance   Additional items Assist x 2; Increased time required;Verbal cues   Stand to Sit 3  Moderate assistance   Additional items Assist x 1; Increased time required;Verbal cues   Additional Comments RW -  VCs for safe hand placement   Functional Mobility   Functional Mobility 3  Moderate assistance   Additional Comments Ax, within room   Additional items Rolling walker   Balance   Static Sitting Fair   Static Standing Poor   Dynamic Standing Poor   Activity Tolerance   Activity Tolerance Patient limited by fatigue   Medical Staff Made Aware PT   Nurse Made Aware Per RN pt appropriate to be seen   RUE Assessment   RUE Assessment WFL   LUE Assessment   LUE Assessment WFL   Hand Function   Gross Motor Coordination Functional   Fine Motor Coordination Impaired  (baseline RA)   Cognition   Arousal/Participation Alert; Cooperative   Attention Within functional limits   Orientation Level Oriented X4   Memory Decreased recall of recent events   Following Commands Follows one step commands with increased time or repetition   Comments Pt A&Ox4  Requires increased time to process and respond   Assessment   Limitation Decreased ADL status; Decreased UE strength;Decreased cognition;Decreased endurance;Decreased self-care trans;Decreased high-level ADLs; Decreased fine motor control   Prognosis Good   Assessment Pt is a 66 y o  female who was admitted to [facility] on 1/10/2022 with generalized weakness, decreased appetite; diagnosed with Sepsis ([address] ) and a-fib with RVR  Pt  has a past medical history of Abnormal thyroid function test, Arthritis, Caries, Edema of right lower extremity, GERD (gastroesophageal reflux disease), Hypertension, and Sarcoid  At baseline pt was completing ADLs IND, IADLs with assist, ambulating MOD IND with cane, (-) driving (van transportation)  Pt lives with her dtr and granddaughter in a 2  with 2nd floor bed/bath  Currently pt requires MIN-MOD A for overall ADLS and for functional mobility/transfers  Pt currently presents with impairments in the following categories -steps to enter environment, limited home support and difficulty performing ADLS activity tolerance, endurance, standing balance/tolerance, sitting balance/tolerance, UE strength and memory  These impairments, as well as pt's fatigue, pain, decreased caregiver support, risk for falls and home environment  limit pt's ability to safely engage in all baseline areas of occupation, includinggrooming, bathing, dressing, toileting, functional mobility/transfers, community mobility and leisure activities  From OT standpoint, recommend [address] upon D/C  OT will continue to follow to address the below stated goals  Goals   Patient Goals to go home   LTG Time Frame 10-14   Long Term Goal #1 see goals below   Plan   Treatment Interventions ADL retraining;Functional transfer training;UE strengthening/ROM; Endurance training;Cognitive reorientation;Patient/family training;Equipment evaluation/education; Compensatory technique education; Energy conservation; Activityengagement   Goal Expiration Date 01/25/22   OT Frequency 3-5x/wk   Recommendation   OT Discharge Recommendation Post acute rehabilitation services   Equipment Recommended Bedside commode   Commode Type Standard   AM-PAC Daily Activity Inpatient   Lower Body Dressing 2   Bathing 2   Toileting 2   Upper Body Dressing 3   Grooming 3   Eating 4   Daily Activity Raw Score 16   Daily Activity Standardized Score (Calc for Raw Score >=11) 35 96   AM-PAC Applied Cognition Inpatient   Following a Speech/Presentation 3   Understanding Ordinary Conversation 4   Taking Medications 3   Remembering Where Things Are Placed or Put Away 3   Remembering List of 4-5 Errands 3   Taking Care of Complicated Tasks 3   Applied Cognition Raw Score 19   Applied Cognition Standardized Score 39 77   Modified Jelena Scale   Modified Jelena Scale 4       The patient's raw score on the AM-PAC Daily Activity inpatient short form is 16, standardized score is 35 96, less than 39 4  Patients at this level are likely to benefit from discharge to post-acute rehabilitation services  Please refer to the recommendation of the Occupational Therapist for safe discharge planning  GOALS     Pt will improve activity tolerance to G for min 30 min txment sessions     Pt will complete UB ADLs with MOD IND and use of adaptive device and DME as needed     Pt will complete LB ADLs with MOD IND w/ use of AE and DME as needed     Pt will complete toileting w/ MOD IND w/ G hygiene/thoroughness using DME as needed     Pt will improve functional transfers to Mod I on/off all surfaces using DME as needed w/ G balance/safety      Pt will improve functional mobility during ADL/IADL/leisure tasks to Mod I using DME as needed w/ G balance/safety      Pt will demonstrate G carryover of pt/caregiver education and training as appropriate w/ mod I w/o cues w/ good tolerance     Pt to participate in ongoing functional cognitive assessment with Good attention/participation to assist with safe D/C planning      Pt will improve bed mobility to MOD IND with Good sitting balance EOB to engage in seated ADL tasks          Juan Daniel Stahl, OT

## 2022-01-11 NOTE — H&P
[facility] [address] 1950, 66 y o  female MRN: [id number]  Unit/Bed#: [facility] 531-01 Encounter: [id number]  Primary Care Provider: Candi Jimenez DO   Date and time admitted to hospital: 1/10/2022 10:11 AM    New onset a-fib [facility])  Assessment & Plan  · On presentation was in afib RVR with -120s  · After IV lopressor x1, converted to NSR  · Was started on heparin drip  · Seen by cardio -> cont lopressor 25 BID, switched to PO eliquis -> if unable to take PO may start heparin again  · F/u echo  · Will need eliquis price check    * Sepsis ([facility] )  Assessment & Plan  · Evolving since admission  · Initially Tachycardia, with leucocytosis, now with rising temp  · Appears weak, lethargic, + cough  · Send Blood cultures, check UA, CXR negative - will check CT chest  · COVID/flu/RSV negative  · procalcitonin 0 33  · Will start on empiric Rocephin, Doxycycline    Lethargy  Assessment & Plan  · Suspect from sepsis  · check lactate, VBG, CT head, ammonia  · Neuro checks  · Keep NPO, ST eval    AMANDA (acute kidney injury) ([facility] )  Assessment & Plan  · BL Cr 0 8-0 9  · Today 1 2  · 2/2 possible infection, poor PO intake  · Will place on slow IVF @ 50/hr    Murmur, cardiac  Assessment & Plan  · Noticed by PCP, was ordered OP echo  · IP echo    Benign essential hypertension  Assessment & Plan  · Hold amlodipine as starting metoprolol for rate control    Rheumatoid arthritis (HCC)  Assessment & Plan  · Continue plaquenil, prednisone 5 mg QD    VTE Pharmacologic Prophylaxis:   Moderate Risk (Score 3-4) - Pharmacological DVT Prophylaxis Ordered: apixaban (Eliquis)  Code Status: Level 1 - Full Code   Discussion with family: Updated  (daughter) at bedside  Anticipated Length of Stay: Patient will be admitted on an inpatient basis with an anticipated length of stay of greater than 2 midnights secondary to sepsis, afib      Total Time for Visit, including Counseling / Coordination of Care: 60 minutes Greater than 50% of this total time spent on direct patient counseling and coordination of care  Chief Complaint: weakness, cough    History of Present Illness:  Dasha Archer is a 66 y o  female with a PMH of HTN, RA, who lives with her daughter was not into the hospital today she was noted to have generalized weakness since last 1 day, daughter did notice some wet cough since yesterday, did not notice patient having any shortness of breath, she does not think patient had any chest pain or abdominal pain, no nausea vomiting or diarrhea  No cardiac history    Review of Systems:  Review of Systems   Unable to perform ROS: Mental status change       Past Medical and Surgical History:   Past Medical History:   Diagnosis Date    Abnormal thyroid function test     last assessed: 2015     Arthritis     Caries     last assessed: 2016     Edema of right lower extremity     last assessed: 2015     GERD (gastroesophageal reflux disease)     Hypertension     Sarcoid        Past Surgical History:   Procedure Laterality Date     SECTION      MULTIPLE TOOTH EXTRACTIONS N/A 2016    Procedure: Surgical extraction of teeth 2, 18, 19, 27, 32; incision and drainage of left subperiosteal abscess ;  Surgeon: Risa Castañeda DMD;  Location: [facility];  Service:        Meds/Allergies:  Prior to Admission medications    Medication Sig Start Date End Date Taking? Authorizing Provider   acetaminophen (TYLENOL) 325 mg tablet Take 2 tablets (650 mg total) by mouth every 4 (four) hours as needed for mild pain, headaches or fever   16   AZIZA Conde   Adalimumab (HUMIRA PEN) 40 MG/0 8ML PNKT Inject 40 mg under the skin every 14 (fourteen) days 14   Historical Provider, MD   alendronate (FOSAMAX) 70 mg tablet Take by mouth every 7 days     Historical Provider, MD   amLODIPine (NORVASC) 10 mg tablet Take 1 tablet (10 mg total) by mouth daily 1/3/22   Jaren Salter DO   cholecalciferol (VITAMIN D3) 1,000 units tablet Take 1,000 Units by mouth daily    Historical Provider, MD   Clobetasol Propionate E 0 05 % emollient cream APPLY TWICE A DAY FOR PALM SKIN DERMITITS 9/29/21   Historical Provider, MD   hydrochlorothiazide (HYDRODIURIL) 12 5 mg tablet Take 0 5 tablets (6 25 mg total) by mouth daily 12/2/21   AZIZA Zhao   hydroxychloroquine (PLAQUENIL) 200 mg tablet Take 200 mg by mouth 2 (two) times a day  Historical Provider, MD   ketoconazole (NIZORAL) 2 % cream Apply topically 2 (two) times a day 7/23/19   Jaren Salter DO   multivitamin [facility]) TABS Take 1 tablet by mouth daily  Patient not taking: Reported on 12/2/2021     Historical Provider, MD   omeprazole (PriLOSEC) 20 mg delayed release capsule TAKE 1 CAPSULE DAILY 1/28/21   Jaren Salter DO   predniSONE 5 mg tablet Take 5 mg by mouth daily  1 mg prednisone 2 x/day    Historical Provider, MD   promethazine-codeine (PHENERGAN WITH CODEINE) 6 25-10 mg/5 mL syrup Take 2 5 mL by mouth daily at bedtime as needed for cough 12/2/21   AZIZA Zhao   triamcinolone (KENALOG) 0 1 % cream Apply 1 application topically 2 (two) times a day To affected area 5/21/18   Historical Provider, MD     I have reviewed home medications with patient family member  Allergies:    Allergies   Allergen Reactions    Methotrexate Derivatives     Shellfish-Derived Products - Food Allergy Itching    Amoxicillin Rash    Ampicillin-Sulbactam Sodium Rash       Social History:  Marital Status: Single   Patient Pre-hospital Living Situation: Home  Patient Pre-hospital Level of Mobility: walks with walker  Patient Pre-hospital Diet Restrictions:   Substance Use History:   Social History     Substance and Sexual Activity   Alcohol Use No     Social History     Tobacco Use   Smoking Status Never Smoker   Smokeless Tobacco Never Used     Social History     Substance and Sexual Activity   Drug Use No Family History:  Family History   Problem Relation Age of Onset    Asthma Mother     Stroke Mother     No Known Problems Father     No Known Problems Sister     No Known Problems Brother     No Known Problems Maternal Grandmother     No Known Problems Maternal Grandfather     No Known Problems Paternal Grandmother     No Known Problems Paternal Grandfather     Diabetes Maternal Aunt     Breast cancer Cousin     Diabetes Cousin     Breast cancer Other        Physical Exam:     Vitals:   Blood Pressure: 98/53 (01/10/22 1957)  Pulse: 100 (01/10/22 1700)  Temperature: 100 3 °F (37 9 °C) (01/10/22 1750)  Temp Source: Oral (01/10/22 1013)  Respirations: 18 (01/10/22 1750)  Height: 5' 4" (162 6 cm) (01/10/22 1500)  Weight - Scale: 116 kg (255 lb) (01/10/22 1500)  SpO2: 95 % (01/10/22 1700)    Physical Exam  Vitals reviewed  HENT:      Head: Normocephalic and atraumatic  Mouth/Throat:      Mouth: Mucous membranes are moist    Cardiovascular:      Rate and Rhythm: Normal rate and regular rhythm  Heart sounds: Normal heart sounds  Pulmonary:      Effort: Pulmonary effort is normal  No respiratory distress  Breath sounds: Normal breath sounds  No wheezing  Comments: Poor efforts  Abdominal:      General: There is no distension  Palpations: Abdomen is soft  Tenderness: There is no abdominal tenderness  Skin:     General: Skin is warm  Neurological:      Mental Status: She is alert        Comments: Sleepy but arousable          Additional Data:     Lab Results:  Results from last 7 days   Lab Units 01/10/22  1207   WBC Thousand/uL 11 15*   HEMOGLOBIN g/dL 15 3   HEMATOCRIT % 49 2*   PLATELETS Thousands/uL 225   NEUTROS PCT % 76*   LYMPHS PCT % 13*   MONOS PCT % 9   EOS PCT % 2     Results from last 7 days   Lab Units 01/10/22  1031   SODIUM mmol/L 138   POTASSIUM mmol/L 4 5   CHLORIDE mmol/L 106   CO2 mmol/L 22   BUN mg/dL 17   CREATININE mg/dL 1 29   ANION GAP mmol/L 10 CALCIUM mg/dL 9 5   GLUCOSE RANDOM mg/dL 78     Results from last 7 days   Lab Units 01/10/22  1043   INR  1 14             Results from last 7 days   Lab Units 01/10/22  1642   PROCALCITONIN ng/ml 0 33*       Imaging: Reviewed radiology reports from this admission including: chest xray  X-ray chest 1 view portable   ED Interpretation by Josee Rivas DO (01/10 1110)   Chest x-ray interpreted me shows no acute cardiopulmonary disease, No change from August 23, 2016      Final Result by Yaz Quintana MD (01/10 1159)      No acute cardiopulmonary disease  Findings concur with the preliminary report by the referring clinician already in PACS and/or our electronic record EPIC  Workstation performed: [id number]         CT head wo contrast    (Results Pending)   CT chest wo contrast    (Results Pending)       EKG and Other Studies Reviewed on Admission:   · EKG: Atrial fibrillation  HR 110s  ** Please Note: This note has been constructed using a voice recognition system   **

## 2022-01-11 NOTE — PROGRESS NOTES
[address] 1950, 66 y o  female MRN: [id number]  Unit/Bed#: [facility] 531-01 Encounter: [id number]  Primary Care Provider: Ashleigh Gutierrez DO   Date and time admitted to hospital: 1/10/2022 10:11 AM    * Sepsis [facility])  Assessment & Plan  · Evolving since admission  · Patient with fever and tachycardia  · Appears weak, lethargic, + cough  · Send Blood cultures, check UA, CXR negative - chest CT is negative for any acute pathology  · COVID/flu/RSV negative  · procalcitonin 0 33-trending procalcitonin  · 1/2 sets of blood culture came back positive for Gram-positive cocci in cultures-and vancomycin  · Follow-up on final culture  May need Infectious Disease evaluation if it is a true infection  · Continue with the antibiotics and recheck procalcitonin    Vomiting  Assessment & Plan  · Patient had vomiting a early today  Start on a diet and see if she tolerates it  Obtain a CT of the abdomen  Lethargy  Assessment & Plan  · Suspect from sepsis  · Head CT is unremarkable for acute pathology  · Neuro checks  · Restarted diet since the patient is more interactive    New onset a-fib [facility])  Assessment & Plan  · On presentation was in afib RVR with -120s  · After IV lopressor x1, converted to NSR  · Seen by cardio -> cont lopressor 25 BID, switched to PO eliquis -> if unable to take PO may start heparin again  · Echocardiogram reviewed  · Will need eliquis price check at the time of discharge    AMANDA (acute kidney injury) ([facility] [address] )  Assessment & Plan  · BL Cr 0 8-0 9  · Today 1 1  · 2/2 possible infection, poor PO intake  · Continue wit current IV fluids    Murmur, cardiac  Assessment & Plan  · Noticed by PCP, was ordered OP echo  · IP echo-no significant valvular abnormality    Skin rash  Assessment & Plan  · Patient noted to have diffuse rash on her skin  Skin is dry and peeling  · No definite diagnosis    Has been going on for the past 5 years started after being on IV antibiotics for a dental infection  · Family reported that the diagnosis of psoriasis was ruled out since the patient did not get better while on Humira  · Consult dermatology    Benign essential hypertension  Assessment & Plan  · Hold amlodipine as starting metoprolol for rate control    Rheumatoid arthritis ([facility] [address] )  Assessment & Plan  · Continue plaquenil, prednisone 5 mg QD      VTE Pharmacologic Prophylaxis:   Pharmacologic: Apixaban (Eliquis)  Mechanical VTE Prophylaxis in Place: Yes    Patient Centered Rounds: I have performed bedside rounds with nursing staff today  Discussions with Specialists or Other Care Team Provider:     Education and Discussions with Family / Patient:  Daughter updated in the room    Time Spent for Care: 30 minutes  More than 50% of total time spent on counseling and coordination of care as described above  Current Length of Stay: 1 day(s)    Current Patient Status: Inpatient   Certification Statement: The patient will continue to require additional inpatient hospital stay due to Sepsis    Discharge Plan:     Code Status: Level 1 - Full Code      Subjective:   Patient seen and examined  Patient appears to be more alert and interactive  Daughter was in the room who reported that the patients mental status is better today when compared to yesterday  Patient denies any abdominal pain  Had episodes of vomiting after starting on the oral diet  Patient with cough and congestion    Objective:     Vitals:   Temp (24hrs), Av 4 °F (37 4 °C), Min:97 9 °F (36 6 °C), Max:101 1 °F (38 4 °C)    Temp:  [97 9 °F (36 6 °C)-101 1 °F (38 4 °C)] 98 8 °F (37 1 °C)  HR:  [84] 84  Resp:  [18-20] 18  BP: ()/(49-54) 118/49  SpO2:  [94 %-100 %] 100 %  Body mass index is 43 77 kg/m²  Input and Output Summary (last 24 hours):        Intake/Output Summary (Last 24 hours) at 2022 1759  Last data filed at 2022 1725  Gross per 24 hour   Intake 1336 67 ml   Output 250 ml   Net 1086 67 ml       Physical Exam:     Physical Exam  Constitutional:       General: She is not in acute distress  HENT:      Head: Normocephalic and atraumatic  Nose: Nose normal    Eyes:      General: No scleral icterus  Cardiovascular:      Rate and Rhythm: Normal rate and regular rhythm  Pulses: Normal pulses  Pulmonary:      Effort: Pulmonary effort is normal       Breath sounds: No rhonchi or rales  Comments: Decreased breath sounds bilateral  Abdominal:      General: Abdomen is flat  There is no distension  Musculoskeletal:         General: Normal range of motion  Cervical back: Normal range of motion  Skin:     Findings: Rash present  Comments: Patient with dry and peeling skin throughout the body-no open areas   Neurological:      Mental Status: She is alert  Comments: Patient appears to be more awake and interactive today  Moves all 4 extremities and follows commands         Additional Data:     Labs:    Results from last 7 days   Lab Units 01/11/22  0506   WBC Thousand/uL 8 95   HEMOGLOBIN g/dL 14 2   HEMATOCRIT % 45 5   PLATELETS Thousands/uL 194   NEUTROS PCT % 79*   LYMPHS PCT % 11*   MONOS PCT % 8   EOS PCT % 2     Results from last 7 days   Lab Units 01/11/22  0506   POTASSIUM mmol/L 4 2   CHLORIDE mmol/L 109*   CO2 mmol/L 22   BUN mg/dL 22   CREATININE mg/dL 1 13   CALCIUM mg/dL 9 2   ALK PHOS U/L 69   ALT U/L 27   AST U/L 39     Results from last 7 days   Lab Units 01/10/22  1043   INR  1 14       * I Have Reviewed All Lab Data Listed Above  * Additional Pertinent Lab Tests Reviewed: She 64 Admission Reviewed    Imaging:    Imaging Reports Reviewed Today Include:   Imaging Personally Reviewed by Myself Includes:    Recent Cultures (last 7 days):     Results from last 7 days   Lab Units 01/10/22  2025 01/10/22  1849   BLOOD CULTURE   --  Received in Microbiology Lab  Culture in Progress     GRAM STAIN RESULT  Gram positive cocci in clusters*  --        Last 24 Hours Medication List:   Current Facility-Administered Medications   Medication Dose Route Frequency Provider Last Rate    acetaminophen  650 mg Oral Q4H PRN Yimi Cartwright MD      apixaban  5 mg Oral BID Scarlett Mota DO      benzonatate  100 mg Oral TID PRN Samir Clinton MD      cefTRIAXone  1,000 mg Intravenous Q24H Yimi Cartwright MD 1,000 mg (01/10/22 2213)    cholecalciferol  1,000 Units Oral Daily Yimi Cartwright MD      ciprofloxacin  1 drop Both Eyes Q4H While Awake Samir Clinton MD      clobetasol   Topical BID Yimi Cartwright MD      clotrimazole   Topical BID Yimi Cartwright MD      doxycycline  100 mg Intravenous Q12H Yimi Cartwright  mg (01/11/22 0805)    guaiFENesin  600 mg Oral Q12H Yarely Mora MD      hydroxychloroquine  200 mg Oral BID Yimi Cartwright MD      metoprolol  5 mg Intravenous Q6H PRN Yimi Cartwright MD      metoprolol tartrate  25 mg Oral Q12H [id number] [place] [address]      MAGALY ANTIFUNGAL   Topical BID Samir Clinton MD      multi-electrolyte  100 mL/hr Intravenous Continuous Samir Clinton MD 50 mL/hr (01/10/22 1929)    pantoprazole  40 mg Oral Early Morning Marzena Mendez MD      predniSONE  5 mg Oral Daily Marzena Mendez MD      sodium chloride (PF)  3 mL Intravenous Q1H PRN Yimi Cartwright MD      vancomycin  10 mg/kg (Adjusted) Intravenous Q12H Samir Clinton  mg (01/11/22 1709)        Today, Patient Was Seen By: Samir Clinton MD    ** Please Note: Dictation voice to text software may have been used in the creation of this document   **

## 2022-01-12 ENCOUNTER — APPOINTMENT (INPATIENT)
Dept: RADIOLOGY | Facility: HOSPITAL | Age: 72
DRG: 595 | End: 2022-01-12
Payer: COMMERCIAL

## 2022-01-12 LAB
ALBUMIN SERPL BCP-MCNC: 2.3 G/DL (ref 3.5–5)
ALP SERPL-CCNC: 58 U/L (ref 46–116)
ALT SERPL W P-5'-P-CCNC: 21 U/L (ref 12–78)
ANION GAP SERPL CALCULATED.3IONS-SCNC: 8 MMOL/L (ref 4–13)
AST SERPL W P-5'-P-CCNC: 25 U/L (ref 5–45)
BILIRUB SERPL-MCNC: 0.87 MG/DL (ref 0.2–1)
BUN SERPL-MCNC: 15 MG/DL (ref 5–25)
CALCIUM ALBUM COR SERPL-MCNC: 10.2 MG/DL (ref 8.3–10.1)
CALCIUM SERPL-MCNC: 8.8 MG/DL (ref 8.3–10.1)
CHLORIDE SERPL-SCNC: 108 MMOL/L (ref 100–108)
CO2 SERPL-SCNC: 25 MMOL/L (ref 21–32)
CREAT SERPL-MCNC: 0.84 MG/DL (ref 0.6–1.3)
ERYTHROCYTE [DISTWIDTH] IN BLOOD BY AUTOMATED COUNT: 14.8 % (ref 11.6–15.1)
GFR SERPL CREATININE-BSD FRML MDRD: 69 ML/MIN/1.73SQ M
GLUCOSE SERPL-MCNC: 68 MG/DL (ref 65–140)
HCT VFR BLD AUTO: 39.9 % (ref 34.8–46.1)
HGB BLD-MCNC: 12.5 G/DL (ref 11.5–15.4)
MCH RBC QN AUTO: 27.4 PG (ref 26.8–34.3)
MCHC RBC AUTO-ENTMCNC: 31.3 G/DL (ref 31.4–37.4)
MCV RBC AUTO: 87 FL (ref 82–98)
PLATELET # BLD AUTO: 169 THOUSANDS/UL (ref 149–390)
PMV BLD AUTO: 11.2 FL (ref 8.9–12.7)
POTASSIUM SERPL-SCNC: 3.8 MMOL/L (ref 3.5–5.3)
PROCALCITONIN SERPL-MCNC: 0.42 NG/ML
PROT SERPL-MCNC: 6.7 G/DL (ref 6.4–8.2)
RBC # BLD AUTO: 4.57 MILLION/UL (ref 3.81–5.12)
S PNEUM AG UR QL: POSITIVE
SODIUM SERPL-SCNC: 141 MMOL/L (ref 136–145)
WBC # BLD AUTO: 6.53 THOUSAND/UL (ref 4.31–10.16)

## 2022-01-12 PROCEDURE — 85027 COMPLETE CBC AUTOMATED: CPT | Performed by: FAMILY MEDICINE

## 2022-01-12 PROCEDURE — 74176 CT ABD & PELVIS W/O CONTRAST: CPT

## 2022-01-12 PROCEDURE — 11105 PUNCH BX SKIN EA SEP/ADDL: CPT | Performed by: DERMATOLOGY

## 2022-01-12 PROCEDURE — 84145 PROCALCITONIN (PCT): CPT | Performed by: FAMILY MEDICINE

## 2022-01-12 PROCEDURE — 0HBBXZX EXCISION OF RIGHT UPPER ARM SKIN, EXTERNAL APPROACH, DIAGNOSTIC: ICD-10-PCS | Performed by: DERMATOLOGY

## 2022-01-12 PROCEDURE — 88313 SPECIAL STAINS GROUP 2: CPT | Performed by: PATHOLOGY

## 2022-01-12 PROCEDURE — G1004 CDSM NDSC: HCPCS

## 2022-01-12 PROCEDURE — 99232 SBSQ HOSP IP/OBS MODERATE 35: CPT | Performed by: FAMILY MEDICINE

## 2022-01-12 PROCEDURE — 11104 PUNCH BX SKIN SINGLE LESION: CPT | Performed by: DERMATOLOGY

## 2022-01-12 PROCEDURE — 88305 TISSUE EXAM BY PATHOLOGIST: CPT | Performed by: PATHOLOGY

## 2022-01-12 PROCEDURE — 99221 1ST HOSP IP/OBS SF/LOW 40: CPT | Performed by: DERMATOLOGY

## 2022-01-12 PROCEDURE — 87449 NOS EACH ORGANISM AG IA: CPT | Performed by: FAMILY MEDICINE

## 2022-01-12 PROCEDURE — 87040 BLOOD CULTURE FOR BACTERIA: CPT | Performed by: FAMILY MEDICINE

## 2022-01-12 PROCEDURE — 80053 COMPREHEN METABOLIC PANEL: CPT | Performed by: FAMILY MEDICINE

## 2022-01-12 RX ORDER — DOXYCYCLINE HYCLATE 100 MG/1
100 CAPSULE ORAL EVERY 12 HOURS SCHEDULED
Status: DISCONTINUED | OUTPATIENT
Start: 2022-01-12 | End: 2022-01-13

## 2022-01-12 RX ORDER — AMMONIUM LACTATE 12 G/100G
LOTION TOPICAL 2 TIMES DAILY PRN
Status: DISCONTINUED | OUTPATIENT
Start: 2022-01-12 | End: 2022-01-26 | Stop reason: HOSPADM

## 2022-01-12 RX ORDER — ALBUTEROL SULFATE 90 UG/1
2 AEROSOL, METERED RESPIRATORY (INHALATION) EVERY 4 HOURS PRN
Status: DISCONTINUED | OUTPATIENT
Start: 2022-01-12 | End: 2022-01-26 | Stop reason: HOSPADM

## 2022-01-12 RX ORDER — LEVALBUTEROL 1.25 MG/.5ML
1.25 SOLUTION, CONCENTRATE RESPIRATORY (INHALATION) EVERY 8 HOURS PRN
Status: DISCONTINUED | OUTPATIENT
Start: 2022-01-12 | End: 2022-01-12

## 2022-01-12 RX ORDER — HYDROCODONE POLISTIREX AND CHLORPHENIRAMINE POLISTIREX 10; 8 MG/5ML; MG/5ML
5 SUSPENSION, EXTENDED RELEASE ORAL EVERY 12 HOURS PRN
Status: DISCONTINUED | OUTPATIENT
Start: 2022-01-12 | End: 2022-01-26 | Stop reason: HOSPADM

## 2022-01-12 RX ADMIN — CIPROFLOXACIN HYDROCHLORIDE 1 DROP: 3 SOLUTION/ DROPS OPHTHALMIC at 09:04

## 2022-01-12 RX ADMIN — CIPROFLOXACIN HYDROCHLORIDE 1 DROP: 3 SOLUTION/ DROPS OPHTHALMIC at 22:15

## 2022-01-12 RX ADMIN — CLOTRIMAZOLE: 1 CREAM TOPICAL at 08:52

## 2022-01-12 RX ADMIN — CLOBETASOL PROPIONATE: 0.5 CREAM TOPICAL at 18:25

## 2022-01-12 RX ADMIN — HYDROXYCHLOROQUINE SULFATE 200 MG: 200 TABLET, FILM COATED ORAL at 08:51

## 2022-01-12 RX ADMIN — SODIUM CHLORIDE, SODIUM GLUCONATE, SODIUM ACETATE, POTASSIUM CHLORIDE, MAGNESIUM CHLORIDE, SODIUM PHOSPHATE, DIBASIC, AND POTASSIUM PHOSPHATE 50 ML/HR: .53; .5; .37; .037; .03; .012; .00082 INJECTION, SOLUTION INTRAVENOUS at 19:40

## 2022-01-12 RX ADMIN — SODIUM CHLORIDE, SODIUM GLUCONATE, SODIUM ACETATE, POTASSIUM CHLORIDE, MAGNESIUM CHLORIDE, SODIUM PHOSPHATE, DIBASIC, AND POTASSIUM PHOSPHATE 100 ML/HR: .53; .5; .37; .037; .03; .012; .00082 INJECTION, SOLUTION INTRAVENOUS at 08:48

## 2022-01-12 RX ADMIN — METOPROLOL TARTRATE 25 MG: 25 TABLET, FILM COATED ORAL at 08:52

## 2022-01-12 RX ADMIN — CIPROFLOXACIN HYDROCHLORIDE 1 DROP: 3 SOLUTION/ DROPS OPHTHALMIC at 06:06

## 2022-01-12 RX ADMIN — CLOTRIMAZOLE: 1 CREAM TOPICAL at 18:25

## 2022-01-12 RX ADMIN — APIXABAN 5 MG: 5 TABLET, FILM COATED ORAL at 08:49

## 2022-01-12 RX ADMIN — MICONAZOLE NITRATE: 20 CREAM TOPICAL at 08:52

## 2022-01-12 RX ADMIN — CLOBETASOL PROPIONATE: 0.5 CREAM TOPICAL at 08:52

## 2022-01-12 RX ADMIN — HYDROXYCHLOROQUINE SULFATE 200 MG: 200 TABLET, FILM COATED ORAL at 18:25

## 2022-01-12 RX ADMIN — GUAIFENESIN 600 MG: 600 TABLET, EXTENDED RELEASE ORAL at 08:49

## 2022-01-12 RX ADMIN — PREDNISONE 5 MG: 5 TABLET ORAL at 08:49

## 2022-01-12 RX ADMIN — CEFTRIAXONE SODIUM 1000 MG: 10 INJECTION, POWDER, FOR SOLUTION INTRAVENOUS at 22:18

## 2022-01-12 RX ADMIN — PANTOPRAZOLE SODIUM 40 MG: 40 TABLET, DELAYED RELEASE ORAL at 06:06

## 2022-01-12 RX ADMIN — APIXABAN 5 MG: 5 TABLET, FILM COATED ORAL at 18:23

## 2022-01-12 RX ADMIN — VANCOMYCIN HYDROCHLORIDE 750 MG: 750 INJECTION, SOLUTION INTRAVENOUS at 06:05

## 2022-01-12 RX ADMIN — DOXYCYCLINE 100 MG: 100 INJECTION, POWDER, LYOPHILIZED, FOR SOLUTION INTRAVENOUS at 08:48

## 2022-01-12 RX ADMIN — Medication 1000 UNITS: at 08:49

## 2022-01-12 RX ADMIN — METOPROLOL TARTRATE 25 MG: 25 TABLET, FILM COATED ORAL at 22:14

## 2022-01-12 RX ADMIN — CIPROFLOXACIN HYDROCHLORIDE 1 DROP: 3 SOLUTION/ DROPS OPHTHALMIC at 18:24

## 2022-01-12 RX ADMIN — MICONAZOLE NITRATE: 20 CREAM TOPICAL at 18:25

## 2022-01-12 NOTE — ASSESSMENT & PLAN NOTE
· Evolving since admission  · Patient with fever and tachycardia  · Appears weak, lethargic, + cough  · Send Blood cultures, check UA, CXR negative - chest CT is negative for any acute pathology  CT abdomen is rather unremarkable   · COVID/flu/RSV negative  · procalcitonin 0 33-procalcitonin today is 0 4  · 1/2 sets of blood culture came back positive for Gram-positive cocci in oflunltc-pezrzmgyp-pobiljht Staph likely contaminant  Will discontinue  · Follow-up on final culture    May need Infectious Disease evaluation if it is a true infection  · Continue with the antibiotics and recheck procalcitonin vancomycin and follow-up on the final culture  · Urine strep and Legionella   · Patient with cough with expectoration-obtain sputum culture

## 2022-01-12 NOTE — CONSULTS
DERMATOLOGY:  Drew Al 66 y o  female MRN: [id number]  Unit/Bed#: [facility] 531-01 Encounter: [id number]            Inpatient consult to Dermatology     Performed by  Walter Carreon MD     Authorized by Jina Minaya MD                Assessment/Recommendations   Based on a thorough discussion of this condition and the management approach to it (including a comprehensive discussion of the known risks, side effects and potential benefits of treatment), the patient (family) agrees to implement the following specific plan:    #Erythroderma  DDX: seborrheic dermatitis vs  Atopic dermatitis vs  Psoriasis vs  Erythrodermic mycosis fungoides vs Sezary syndrome  - Two punch biopsies on right upper arm  - Change bandages once daily with application of Vaseline  - Due to the extent of the body surface area, topical steroids are not warranted at this time; will defer oral steroids due to new onset afib and concern for sepsis    Please note that in an erythrodermic state, the patient's skin barrier is compromised  Consequently, she is at risk for sepsis, electrolyte imbalances, congestive heart failure etc       Please set up discharge follow up by calling our office: [phone])     Thank you for involving me in the care of your patient  Please call with questions, change in clinical status or if tests recommended above are abnormal      Discussed with the primary service (Dr Rachel Phan)    The patient was seen and discussed with Dr Isaac Carreon  Dermatology PGY-3 Resident Physician           PROCEDURE NOTE:  PUNCH BIOPSY      Performing Physician: Ashleigh Mondragon    Anatomic Location; Clinical Description with size (cm); Pre-Op Diagnosis:     Specimen A: Location: Right medial upper arm Skin;4 mm Punch biopsy;  66y o  year old  Female with a Description: diffuse erythroderma and worsening desquamation- states rash has been present for five years;  Differential diagnosis: seborrheic dermatitis vs psoriasis vs atopic dermatitis vs erythrodermic mycosis fungoides vs Sézary syndrome      Specimen B: Location: Right lateral upper arm Skin;4 mm Punch biopsy;  66y o  year old  Female with a Description: diffuse erythroderma and worsening desquamation- states rash has been present for five years; Differential diagnosis: seborrheic dermatitis vs psoriasis vs atopic dermatitis vs erythrodermic mycosis fungoides vs Sézary  syndrome       Anesthesia: 1% xylocaine with epi       Topical anesthesia: None       Indications: To indicate diagnosis and management plan  Procedure Details     Patient informed of the risks (including bleeding,scaring and infection) and benefits of the procedure explained  Verbal and written informed consent obtained  The area was prepped and draped in the usual fashion  Anesthesia was obtained with 1% lidocaine with epinephrine  The skin was then stretched perpendicular to the skin tension lines and a punch biopsy to an appropriate sampling depth was obtained with a 4 mm punch with a forceps and iris scissors  Hemostasis was obtained with 4-0 Ethilon x 2 sutures in each suture site      Complications:  None      Specimen has been sent for review by Dermatopathology  Plan:  1  Instructed to keep the wound dry and covered for 24-48h and clean thereafter  2  Warning signs of infection were reviewed  3  Recommended that the patient use acetaminophen as needed for pain  4  Sutures if any should be removed in 14 days      Standard post-procedure care has been explained and has been included in written form within the patient's copy of Informed Consent  History:     HISTORY OF PRESENT ILLNESS:    Reason for Consult: Worsening Rash    HPI: Fabby Porras is a 66y o  year old female who presented to the hospital for fatigue and was found to be septic and new onset atrial fibrillation   Dermatology was consulted for patient's extensive rash that has been present for five years but is worsening recently  The patient and her daughter state that they think this rash started after patient received an antibiotic five years ago  Patient states she was seen by a dermatologist in the past who biopsied it; however, she is unsure of what the rash was  No treatment was given  She also has rheumatoid arthritis and was started on Humira- does not think the rash improved on the Humira and stopped the humira a month ago  States the rash is very itchy  States that every morning she has to clean her eyes to open them  Also endorses painful vision  PAST MEDICAL HISTORY:  Past Medical History:   Diagnosis Date    Abnormal thyroid function test     last assessed: 2015     Arthritis     Caries     last assessed: 2016     Edema of right lower extremity     last assessed: 2015     GERD (gastroesophageal reflux disease)     Hypertension     Sarcoid      Past Surgical History:   Procedure Laterality Date     SECTION      MULTIPLE TOOTH EXTRACTIONS N/A 2016    Procedure: Surgical extraction of teeth 2, 18, 19, 30, 31; incision and drainage of left subperiosteal abscess ;  Surgeon: Prachi Kuo DMD;  Location: [facility];  Service:        FAMILY HISTORY:  Non-contributory    SOCIAL HISTORY:  Social History   Single  Social History     Substance and Sexual Activity   Alcohol Use No     Social History     Substance and Sexual Activity   Drug Use No     Social History     Tobacco Use   Smoking Status Never Smoker   Smokeless Tobacco Never Used       ALLERGIES:  Allergies   Allergen Reactions    Methotrexate Derivatives     Shellfish-Derived Products - Food Allergy Itching    Amoxicillin Rash    Ampicillin-Sulbactam Sodium Rash       MEDICATIONS:  All current active medications have been reviewed         Current Facility-Administered Medications:     acetaminophen (TYLENOL) tablet 650 mg, 650 mg, Oral, Q4H PRN, Marzena Mendez MD, 650 mg at 01/10/22 2117    albuterol (PROVENTIL HFA,VENTOLIN HFA) inhaler 2 puff, 2 puff, Inhalation, Q4H PRN, Mariama Hernandez MD    ammonium lactate (LAC-HYDRIN) 12 % lotion, , Topical, BID PRN, Mariama Hernandez MD    apixaban [facility]) tablet 5 mg, 5 mg, Oral, BID, Scarlett Nakul, DO, 5 mg at 01/12/22 0849    benzonatate (TESSALON PERLES) capsule 100 mg, 100 mg, Oral, TID PRN, Mariama Hernandez MD    cefTRIAXone (ROCEPHIN) 1,000 mg in dextrose 5 % 50 mL IVPB, 1,000 mg, Intravenous, Q24H, Jessica Cutler MD, Stopped at 01/11/22 2326    cholecalciferol (VITAMIN D3) tablet 1,000 Units, 1,000 Units, Oral, Daily, Jessica Cutler MD, 1,000 Units at 01/12/22 0849    ciprofloxacin (CILOXAN) 0 3 % ophthalmic solution 1 drop, 1 drop, Both Eyes, Q4H While Awake, Mariama Hernandez MD, 1 drop at 01/12/22 0904    clobetasol (TEMOVATE) 0 05 % cream, , Topical, BID, Jessica Cutler MD, Given at 01/12/22 0852    clotrimazole (LOTRIMIN) 1 % cream, , Topical, BID, Jessica Cutler MD, Given at 01/12/22 0852    doxycycline hyclate (VIBRAMYCIN) capsule 100 mg, 100 mg, Oral, Q12H [address], Mariama Hernandez MD    guaiFENesin [facility]) 12 hr tablet 600 mg, 600 mg, Oral, Q12H [address], Mariama Hernandez MD, 600 mg at 01/12/22 0849    hydroxychloroquine (PLAQUENIL) tablet 200 mg, 200 mg, Oral, BID, Marzena Mendez MD, 200 mg at 01/12/22 0851    metoprolol (LOPRESSOR) injection 5 mg, 5 mg, Intravenous, Q6H PRN, Jessica Cutler MD    metoprolol tartrate (LOPRESSOR) tablet 25 mg, 25 mg, Oral, Q12H [address], Scarlett Nakul, DO, 25 mg at 01/12/22 0852    moisture barrier miconazole 2% cream (aka MAGALY MOISTURE BARRIER ANTIFUNGAL CREAM), , Topical, BID, Mariama Hernandez MD, Given at 01/12/22 6174    multi-electrolyte (PLASMALYTE-A/ISOLYTE-S PH 7 4) IV solution, 50 mL/hr, Intravenous, Continuous, Mariama Hernandez MD, Last Rate: 50 mL/hr at 01/12/22 1739, 50 mL/hr at 01/12/22 1739    pantoprazole (PROTONIX) EC tablet 40 mg, 40 mg, Oral, Early Morning, Diego Johnston MD, 40 mg at 22 0606    predniSONE tablet 5 mg, 5 mg, Oral, Daily, Diego Johnston MD, 5 mg at 22 0849    Insert peripheral IV, , , Once **AND** sodium chloride (PF) 0 9 % injection 3 mL, 3 mL, Intravenous, Q1H PRN, Diego Johnston MD      Physical exam:     Temp:  [97 9 °F (36 6 °C)-99 7 °F (37 6 °C)] 99 7 °F (37 6 °C)  HR:  [80-95] 83  Resp:  [16-20] 16  BP: (103-146)/(54-61) 129/60  SpO2:  [95 %-98 %] 97 %  Temp (24hrs), Av 1 °F (37 3 °C), Min:97 9 °F (36 6 °C), Max:99 7 °F (37 6 °C)  Current: Temperature: 99 7 °F (37 6 °C)    PSYCH: Normal mood and affect  EYES: Normal conjunctiva  ENT: Normal lips and oral mucosa  CARDIOVASCULAR: No edema  RESPIRATORY: Normal respirations      Diffuse erythema with desquamating skin                      Labs, Imaging, & Other Studies     Lab Results:  I have personally reviewed pertinent labs  Results from last 7 days   Lab Units 22  0634 22  0506 01/10/22  1207   WBC Thousand/uL 6 53 8 95 11 15*   HEMOGLOBIN g/dL 12 5 14 2 15 3   PLATELETS Thousands/uL 169 194 225     Results from last 7 days   Lab Units 22  0634 22  0506 22  0506   POTASSIUM mmol/L 3 8   < > 4 2   CHLORIDE mmol/L 108   < > 109*   CO2 mmol/L 25   < > 22   BUN mg/dL 15   < > 22   CREATININE mg/dL 0 84   < > 1 13   EGFR ml/min/1 73sq m 69   < > 48   CALCIUM mg/dL 8 8   < > 9 2   AST U/L 25  --  39   ALT U/L 21   < > 27   ALK PHOS U/L 58   < > 69    < > = values in this interval not displayed  Results from last 7 days   Lab Units 22  0634 01/10/22  2025 01/10/22  1849   BLOOD CULTURE  Received in Microbiology Lab  Culture in Progress  Received in Microbiology Lab  Culture in Progress  Staphylococcus coagulase negative* No Growth at 24 hrs  GRAM STAIN RESULT   --  Gram positive cocci in clusters*  --        Pathology:   I have personally reviewed pertinent reports

## 2022-01-12 NOTE — RESPIRATORY THERAPY NOTE
RT Protocol Note  Tariq Ayala 66 y o  female MRN: [id number]  Unit/Bed#: [facility] 531-01 Encounter: [id number]    Assessment    Principal Problem:    Sepsis ([facility] )  Active Problems:    Rheumatoid arthritis ([facility] )    GERD (gastroesophageal reflux disease)    Benign essential hypertension    Skin rash    Murmur, cardiac    AMANDA (acute kidney injury) ([facility] )    New onset a-fib ([facility] )    Lethargy    Vomiting      Home Pulmonary Medications:         Past Medical History:   Diagnosis Date    Abnormal thyroid function test     last assessed: Sept 25, 2015     Arthritis     Caries     last assessed: Sept 9, 2016     Edema of right lower extremity     last assessed: March 18, 2015     GERD (gastroesophageal reflux disease)     Hypertension     Sarcoid      Social History     Socioeconomic History    Marital status: Single     Spouse name: None    Number of children: None    Years of education: None    Highest education level: None   Occupational History    Occupation: Retired: Worked for [employer]    Tobacco Use    Smoking status: Never Smoker    Smokeless tobacco: Never Used   Substance and Sexual Activity    Alcohol use: No    Drug use: No    Sexual activity: None   Other Topics Concern    None   Social History Narrative     noted in "[employer]scripts"      Social Determinants of Health     Financial Resource Strain: Not on file   Food Insecurity: Not on file   Transportation Needs: Not on file   Physical Activity: Not on file   Stress: Not on file   Social Connections: Not on file   Intimate Partner Violence: Not on file   Housing Stability: Not on file       Subjective         Objective    Physical Exam:   Assessment Type: Assess only  General Appearance: Alert,Awake  Respiratory Pattern: Normal  Chest Assessment: Chest expansion symmetrical  Bilateral Breath Sounds: Clear    Vitals:  Blood pressure 134/60, pulse 80, temperature 97 9 °F (36 6 °C), temperature source Axillary, resp   rate 16, height 5' 4" (1 626 m), weight 117 kg (258 lb), SpO2 96 %  Imaging and other studies: I have personally reviewed pertinent reports  Plan    Respiratory Plan: Discontinue Protocol        Resp Comments: Pt admitted with sepsis, pt denied any pulmonary Hx or home resp meds, she never smoked  at this time BS are clear and pt is resting confortable on 2L NC SpO2 97% no further intervention is needed at this time   resp protocol will be d/c'd

## 2022-01-12 NOTE — ASSESSMENT & PLAN NOTE
· Suspect from sepsis  · Head CT is unremarkable for acute pathology  · Neuro checks  · Neuro check improving

## 2022-01-12 NOTE — PROGRESS NOTES
[address] 1950, 66 y o  female MRN: [id number]  Unit/Bed#: [facility] 531-01 Encounter: [id number]  Primary Care Provider: Julianne Torres DO   Date and time admitted to hospital: 1/10/2022 10:11 AM    * Sepsis [facility])  Assessment & Plan  · Evolving since admission  · Patient with fever and tachycardia  · Appears weak, lethargic, + cough  · Send Blood cultures, check UA, CXR negative - chest CT is negative for any acute pathology  CT abdomen is rather unremarkable   · COVID/flu/RSV negative  · procalcitonin 0 33-procalcitonin today is 0 4  · 1/2 sets of blood culture came back positive for Gram-positive cocci in pjtcftvt-[id number]-[id number] Staph likely contaminant  Will discontinue  · Follow-up on final culture  May need Infectious Disease evaluation if it is a true infection  · Continue with the antibiotics and recheck procalcitonin vancomycin and follow-up on the final culture  · Urine strep and Legionella   · Patient with cough with expectoration-obtain sputum culture    Vomiting  Assessment & Plan  · Appears to be resolved  Patient tolerating diet      Lethargy  Assessment & Plan  · Suspect from sepsis  · Head CT is unremarkable for acute pathology  · Neuro checks  · Neuro check improving    New onset a-fib [facility])  Assessment & Plan  · On presentation was in afib RVR with -120s  · After IV lopressor x1, converted to NSR  · Seen by cardio -> cont lopressor 25 BID, switched to PO eliquis  · Will need eliquis price check at the time of discharge  · Patient remained in sinus rhythm    AMANDA (acute kidney injury) ([facility] [address] )  Assessment & Plan  · BL Cr 0 8-0 9  · Creatinine down to 0 8     Murmur, cardiac  Assessment & Plan  · Noticed by PCP, was ordered OP echo  · IP echo-no significant valvular abnormality    Skin rash  Assessment & Plan  · Patient noted to have diffuse rash on her skin  Skin is dry and peeling  · No definite diagnosis    Has been going on for the past 5 years started after being on IV antibiotics for a dental infection  · Family reported that the diagnosis of psoriasis was ruled out since the patient did not get better while on Humira for rheumatoid arthritis  · Consult dermatology    Benign essential hypertension  Assessment & Plan  · Hold amlodipine as starting metoprolol for rate control    Rheumatoid arthritis ([facility] [address] )  Assessment & Plan  · Continue plaquenil, prednisone 5 mg QD        VTE Pharmacologic Prophylaxis:   Pharmacologic: Apixaban (Eliquis)  Mechanical VTE Prophylaxis in Place: Yes    Patient Centered Rounds: I have performed bedside rounds with nursing staff today  Discussions with Specialists or Other Care Team Provider:     Education and Discussions with Family / Patient:  Patient , daughter in the room    Time Spent for Care: 30 minutes  More than 50% of total time spent on counseling and coordination of care as described above  Current Length of Stay: 2 day(s)    Current Patient Status: Inpatient   Certification Statement: The patient will continue to require additional inpatient hospital stay due to IV antibiotics    Discharge Plan:     Code Status: Level 1 - Full Code      Subjective:   Patient seen and examined  Still with cough and expectoration  Appetite is improving  Patient reported that she was able to tolerate broth today  CT scan of the abdomen is rather unremarkable  Remained afebrile    Objective:     Vitals:   Temp (24hrs), Av 1 °F (37 3 °C), Min:97 9 °F (36 6 °C), Max:99 7 °F (37 6 °C)    Temp:  [97 9 °F (36 6 °C)-99 7 °F (37 6 °C)] 99 7 °F (37 6 °C)  HR:  [80-95] 83  Resp:  [16-20] 16  BP: (103-146)/(54-61) 129/60  SpO2:  [95 %-98 %] 97 %  Body mass index is 44 29 kg/m²  Input and Output Summary (last 24 hours):        Intake/Output Summary (Last 24 hours) at 2022 1635  Last data filed at 2022 1524  Gross per 24 hour   Intake 1798 34 ml   Output 1271 ml   Net 527 34 ml Physical Exam:     Physical Exam  Constitutional:       General: She is not in acute distress  HENT:      Head: Normocephalic  Nose: Nose normal    Eyes:      General: No scleral icterus  Cardiovascular:      Rate and Rhythm: Normal rate and regular rhythm  Pulmonary:      Breath sounds: Wheezing and rhonchi present  Comments: Decreased breath sounds bilateral  Abdominal:      General: Abdomen is flat  There is no distension  Tenderness: There is no abdominal tenderness  Musculoskeletal:      Right lower leg: No edema  Left lower leg: No edema  Skin:     Findings: Rash present  Comments: Noted to have diffuse skin rash with diffuse peeling   Neurological:      General: No focal deficit present  Mental Status: She is alert  Additional Data:     Labs:    Results from last 7 days   Lab Units 01/12/22  0634 01/11/22  0506 01/11/22  0506   WBC Thousand/uL 6 53   < > 8 95   HEMOGLOBIN g/dL 12 5   < > 14 2   HEMATOCRIT % 39 9   < > 45 5   PLATELETS Thousands/uL 169   < > 194   NEUTROS PCT %  --   --  79*   LYMPHS PCT %  --   --  11*   MONOS PCT %  --   --  8   EOS PCT %  --   --  2    < > = values in this interval not displayed  Results from last 7 days   Lab Units 01/12/22  0634   POTASSIUM mmol/L 3 8   CHLORIDE mmol/L 108   CO2 mmol/L 25   BUN mg/dL 15   CREATININE mg/dL 0 84   CALCIUM mg/dL 8 8   ALK PHOS U/L 58   ALT U/L 21   AST U/L 25     Results from last 7 days   Lab Units 01/10/22  1043   INR  1 14       * I Have Reviewed All Lab Data Listed Above  * Additional Pertinent Lab Tests Reviewed: She 64 Admission Reviewed    Imaging:    Imaging Reports Reviewed Today Include:   Imaging Personally Reviewed by Myself Includes:      Recent Cultures (last 7 days):     Results from last 7 days   Lab Units 01/12/22  0634 01/10/22  2025 01/10/22  1849   BLOOD CULTURE  Received in Microbiology Lab  Culture in Progress    Received in Microbiology Lab  Culture in Progress  Staphylococcus coagulase negative* No Growth at 24 hrs  GRAM STAIN RESULT   --  Gram positive cocci in clusters*  --        Last 24 Hours Medication List:   Current Facility-Administered Medications   Medication Dose Route Frequency Provider Last Rate    acetaminophen  650 mg Oral Q4H PRN Jessica Cutler MD      albuterol  2 puff Inhalation Q4H PRN Mariama Hernandez MD      ammonium lactate   Topical BID PRN Mariama Hernandez MD      apixaban  5 mg Oral BID Shawanda, DO      benzonatate  100 mg Oral TID PRN Mariama Hernandez MD      cefTRIAXone  1,000 mg Intravenous Q24H Jessica Cutler MD Stopped (01/11/22 4979)    cholecalciferol  1,000 Units Oral Daily Jessica Cutler MD      ciprofloxacin  1 drop Both Eyes Q4H While Awake Mariama Hernandez MD      clobetasol   Topical BID Jessica Cutler MD      clotrimazole   Topical BID Jessica Cutler MD      doxycycline hyclate  100 mg Oral Q12H [facility] Mariama Hernandez MD      guaiFENesin  600 mg Oral Q12H Rolando Tillman MD      hydroxychloroquine  200 mg Oral BID Jessica Cutler MD      metoprolol  5 mg Intravenous Q6H PRN Jessica Cutler MD      metoprolol tartrate  25 mg Oral Q12H [address] [place], [place]      MAGALY ANTIFUNGAL   Topical BID Mariama Hernandez MD      multi-electrolyte  50 mL/hr Intravenous Continuous Mariama Hernandez  mL/hr (01/12/22 0848)    pantoprazole  40 mg Oral Early Morning Jessica Cutler MD      predniSONE  5 mg Oral Daily Marzena Mendez MD      sodium chloride (PF)  3 mL Intravenous Q1H PRN Jessica Cutler MD          Today, Patient Was Seen By: Mariama Hernandez MD    ** Please Note: Dictation voice to text software may have been used in the creation of this document   **

## 2022-01-12 NOTE — CASE MANAGEMENT
Case Management Assessment & Discharge Planning Note    Patient name Eric Temple  [facility] MRN [id number]  : 1950 Date 2022       Current Admission Date: 1/10/2022  Current Admission Diagnosis:Sepsis [facility])   Patient Active Problem List    Diagnosis Date Noted    Vomiting 2022    AMANDA (acute kidney injury) ([facility] [address] ) 01/10/2022    New onset a-fib ([facility] [address] ) 01/10/2022    [facility] 01/10/[facility]    Lethargy 01/10/2022    Continuous opioid dependence [facility] 2021    Hyperparathyroidism [facility] 2021    Medicare annual wellness visit, subsequent 2021    Murmur, cardiac 2021    Morbid obesity with BMI of 40 0-44 9, adult ([facility] ) 2019    BPPV (benign paroxysmal positional vertigo) 2018    Seasonal allergic rhinitis due to pollen 2018    Skin rash 10/27/2017    Osteoporosis 2016    Rheumatoid arthritis ([facility] [address] ) 2016    GERD (gastroesophageal reflux disease) 2016    Sarcoid 2016    Morbid obesity ([facility] 2016    Vitamin D deficiency 2015    Benign essential hypertension 2014    Lumbar radiculopathy 2014      LOS (days): 2  Geometric Mean LOS (GMLOS) (days):   Days to GMLOS:     OBJECTIVE:    Risk of Unplanned Readmission Score: 16         Current admission status: Inpatient       Preferred Pharmacy:   [address] [place] [address] [facility] [address]  Phone: [phone] Fax: [phone]    Primary Care Provider: Candi Jimenez DO    Primary Insurance: [facility] [address] [facility] [address]  Secondary Insurance:     ASSESSMENT:  Active Health Care Agents    There are no active Health Care Agents on file           Readmission Root Cause  30 Day Readmission: No    Patient Information  Admitted from[de-identified] Home  Mental Status: Alert  During Assessment patient was accompanied by: Daughter  Assessment information provided by[de-identified] Daughter  Primary Caregiver: Self  Support Systems: Family members  Type of Current Residence: Apartment  Floor Level: 2  Upon entering residence, is there a bedroom on the main floor (no further steps)?: No  A bedroom is located on the following floor levels of residence (select all that apply):: 2nd Floor  Upon entering residence, is there a bathroom on the main floor (no further steps)?: No  Indicate which floors of current residence have a bathroom (select all the apply):: 2nd Floor  Number of steps to 2nd floor from main floor: One Flight  Living Arrangements: Lives w/ Extended Family  Is patient a ?: No    Activities of Daily Living Prior to Admission  Functional Status: Independent  Completes ADLs independently?: Yes  Ambulates independently?: Yes  Does patient use assisted devices?: No  Does patient currently own DME?: Yes  What DME does the patient currently own?: [employer]  Does patient have a history of Outpatient Therapy (PT/OT)?: No  Does the patient have a history of Short-Term Rehab?: No  Does patient have a history of [facility]?: No  Does patient currently have eleni ?: No         Patient Information Continued  Income Source: Pension/prison  Does patient have a history of substance abuse?: No  Does patient have a history of Mental Health Diagnosis?: No         Means of Transportation  [facility]        DISCHARGE DETAILS:    Discharge planning discussed with[de-identified] Daughter  Freedom of Choice: Yes  Comments - Freedom of Choice: Cm dsicussed STR recommendations  Daughter would like to see if pt is able to progress to home discharge and cm to f/u       Treatment Team Recommendation: Short Term Rehab     Family would like cm to f/u once pt is more stable

## 2022-01-12 NOTE — ASSESSMENT & PLAN NOTE
· On presentation was in afib RVR with -120s  · After IV lopressor x1, converted to NSR  · Seen by cardio -> cont lopressor 25 BID, switched to PO eliquis  · Will need eliquis price check at the time of discharge  · Patient remained in sinus rhythm

## 2022-01-12 NOTE — PROGRESS NOTES
The doxycycline has / have been converted to Oral per [facility] IV-to-PO Auto-Conversion Protocol for Adults as approved by the Pharmacy and Therapeutics Committee  The patient met all eligible criteria:  3 Age = 25years old   2) Received at least one dose of the IV form   3) Receiving at least one other scheduled oral/enteral medication   4) Tolerating an oral/enteral diet   and did not have any exclusions:   1) Critical care patient   2) Active GI bleed (IF assessing H2RAs or PPIs)   3) Continuous tube feeding (IF assessing cipro, doxycycline, levofloxacin, minocycline, rifampin, or voriconazole)   4) Receiving PO vancomycin (IF assessing metronidazole)   5) Persistent nausea and/or vomiting   6) Ileus or gastrointestinal obstruction   7) Alida/nasogastric tube set for continuous suction   8) Specific order not to automatically convert to PO (in the order's comments or if discussed in the most recent Infectious Disease or primary team's progress notes)       Thanks  Anya Diehl, Pharmacist

## 2022-01-12 NOTE — CONSULTS
Vancomycin IV Pharmacy-to-Dose Consultation    Pat Garsia is a 66 y o  female who was receiving Vancomycin IV with management by the Pharmacy Consult service for treatment of bacteremia    The patient's Vancomycin therapy has been discontinued  Thank you for allowing us to take part in this patient's care  Pharmacy will sign-off now; please call or re-consult if there are any questions        Rian Ulloa, pharmacist

## 2022-01-12 NOTE — PLAN OF CARE
Problem: MOBILITY - ADULT  Goal: Maintain or return to baseline ADL function  Description: INTERVENTIONS:  -  Assess patient's ability to carry out ADLs; assess patient's baseline for ADL function and identify physical deficits which impact ability to perform ADLs (bathing, care of mouth/teeth, toileting, grooming, dressing, etc )  - Assess/evaluate cause of self-care deficits   - Assess range of motion  - Assess patient's mobility; develop plan if impaired  - Assess patient's need for assistive devices and provide as appropriate  - Encourage maximum independence but intervene and supervise when necessary  - Involve family in performance of ADLs  - Assess for home care needs following discharge   - Consider OT consult to assist with ADL evaluation and planning for discharge  - Provide patient education as appropriate  Outcome: Progressing  Goal: Maintains/Returns to pre admission functional level  Description: INTERVENTIONS:  - Perform BMAT or MOVE assessment daily    - Set and communicate daily mobility goal to care team and patient/family/caregiver  - Collaborate with rehabilitation services on mobility goals if consulted  - Perform Range of Motion 4 times a day  - Reposition patient every 2 hours    - Dangle patient 3 times a day  - Stand patient 3 times a day  - Ambulate patient 3 times a day  - Out of bed to chair 3 times a day   - Out of bed for meals 3 times a day  - Out of bed for toileting  - Record patient progress and toleration of activity level   Outcome: Progressing     Problem: Prexisting or High Potential for Compromised Skin Integrity  Goal: Skin integrity is maintained or improved  Description: INTERVENTIONS:  - Identify patients at risk for skin breakdown  - Assess and monitor skin integrity  - Assess and monitor nutrition and hydration status  - Monitor labs   - Assess for incontinence   - Turn and reposition patient  - Assist with mobility/ambulation  - Relieve pressure over bony prominences  - Avoid friction and shearing  - Provide appropriate hygiene as needed including keeping skin clean and dry  - Evaluate need for skin moisturizer/barrier cream  - Collaborate with interdisciplinary team   - Patient/family teaching  - Consider wound care consult   Outcome: Progressing     Problem: Potential for Falls  Goal: Patient will remain free of falls  Description: INTERVENTIONS:  - Educate patient/family on patient safety including physical limitations  - Instruct patient to call for assistance with activity   - Consult OT/PT to assist with strengthening/mobility   - Keep Call bell within reach  - Keep bed low and locked with side rails adjusted as appropriate  - Keep care items and personal belongings within reach  - Initiate and maintain comfort rounds  - Make Fall Risk Sign visible to staff  - Offer Toileting every 2 Hours, in advance of need  - Initiate/Maintain bedalarm  - Obtain necessary fall risk management equipment  - Apply yellow socks and bracelet for high fall risk patients  - Consider moving patient to room near nurses station  Outcome: Progressing     Problem: Nutrition/Hydration-ADULT  Goal: Nutrient/Hydration intake appropriate for improving, restoring or maintaining nutritional needs  Description: Monitor and assess patient's nutrition/hydration status for malnutrition  Collaborate with interdisciplinary team and initiate plan and interventions as ordered  Monitor patient's weight and dietary intake as ordered or per policy  Utilize nutrition screening tool and intervene as necessary  Determine patient's food preferences and provide high-protein, high-caloric foods as appropriate       INTERVENTIONS:  - Monitor oral intake, urinary output, labs, and treatment plans  - Assess nutrition and hydration status and recommend course of action  - Evaluate amount of meals eaten  - Assist patient with eating if necessary   - Allow adequate time for meals  - Recommend/ encourage appropriate diets, oral nutritional supplements, and vitamin/mineral supplements  - Order, calculate, and assess calorie counts as needed  - Recommend, monitor, and adjust tube feedings and TPN/PPN based on assessed needs  - Assess need for intravenous fluids  - Provide specific nutrition/hydration education as appropriate  - Include patient/family/caregiver in decisions related to nutrition  Outcome: Progressing     Problem: PAIN - ADULT  Goal: Verbalizes/displays adequate comfort level or baseline comfort level  Description: Interventions:  - Encourage patient to monitor pain and request assistance  - Assess pain using appropriate pain scale  - Administer analgesics based on type and severity of pain and evaluate response  - Implement non-pharmacological measures as appropriate and evaluate response  - Consider cultural and social influences on pain and pain management  - Notify physician/advanced practitioner if interventions unsuccessful or patient reports new pain  Outcome: Progressing     Problem: INFECTION - ADULT  Goal: Absence or prevention of progression during hospitalization  Description: INTERVENTIONS:  - Assess and monitor for signs and symptoms of infection  - Monitor lab/diagnostic results  - Monitor all insertion sites, i e  indwelling lines, tubes, and drains  - Monitor endotracheal if appropriate and nasal secretions for changes in amount and color  - [place] appropriate cooling/warming therapies per order  - Administer medications as ordered  - Instruct and encourage patient and family to use good hand hygiene technique  - Identify and instruct in appropriate isolation precautions for identified infection/condition  Outcome: Progressing     Problem: SAFETY ADULT  Goal: Maintain or return to baseline ADL function  Description: INTERVENTIONS:  -  Assess patient's ability to carry out ADLs; assess patient's baseline for ADL function and identify physical deficits which impact ability to perform ADLs (bathing, care of mouth/teeth, toileting, grooming, dressing, etc )  - Assess/evaluate cause of self-care deficits   - Assess range of motion  - Assess patient's mobility; develop plan if impaired  - Assess patient's need for assistive devices and provide as appropriate  - Encourage maximum independence but intervene and supervise when necessary  - Involve family in performance of ADLs  - Assess for home care needs following discharge   - Consider OT consult to assist with ADL evaluation and planning for discharge  - Provide patient education as appropriate  Outcome: Progressing  Goal: Maintains/Returns to pre admission functional level  Description: INTERVENTIONS:  - Perform BMAT or MOVE assessment daily    - Set and communicate daily mobility goal to care team and patient/family/caregiver  - Collaborate with rehabilitation services on mobility goals if consulted  - Perform Range of Motion 4 times a day  - Reposition patient every 2 hours    - Dangle patient 3 times a day  - Stand patient 3 times a day  - Ambulate patient 3 times a day  - Out of bed to chair 3 times a day   - Out of bed for meals 3 times a day  - Out of bed for toileting  - Record patient progress and toleration of activity level   Outcome: Progressing  Goal: Patient will remain free of falls  Description: INTERVENTIONS:  - Educate patient/family on patient safety including physical limitations  - Instruct patient to call for assistance with activity   - Consult OT/PT to assist with strengthening/mobility   - Keep Call bell within reach  - Keep bed low and locked with side rails adjusted as appropriate  - Keep care items and personal belongings within reach  - Initiate and maintain comfort rounds  - Make Fall Risk Sign visible to staff  - Offer Toileting every 2 Hours, in advance of need  - Initiate/Maintain bedalarm  - Obtain necessary fall risk management equipment  - Apply yellow socks and bracelet for high fall risk patients  - Consider moving patient to room near nurses station  Outcome: Progressing     Problem: DISCHARGE PLANNING  Goal: Discharge to home or other facility with appropriate resources  Description: INTERVENTIONS:  - Identify barriers to discharge w/patient and caregiver  - Arrange for needed discharge resources and transportation as appropriate  - Identify discharge learning needs (meds, wound care, etc )  - Arrange for interpretive services to assist at discharge as needed  - Refer to Case Management Department for coordinating discharge planning if the patient needs post-hospital services based on physician/advanced practitioner order or complex needs related to functional status, cognitive ability, or social support system  Outcome: Progressing     Problem: Knowledge Deficit  Goal: Patient/family/caregiver demonstrates understanding of disease process, treatment plan, medications, and discharge instructions  Description: Complete learning assessment and assess knowledge base    Interventions:  - Provide teaching at level of understanding  - Provide teaching via preferred learning methods  Outcome: Progressing

## 2022-01-12 NOTE — ASSESSMENT & PLAN NOTE
· Patient noted to have diffuse rash on her skin  Skin is dry and peeling  · No definite diagnosis    Has been going on for the past 5 years started after being on IV antibiotics for a dental infection  · Family reported that the diagnosis of psoriasis was ruled out since the patient did not get better while on Humira for rheumatoid arthritis  · Consult dermatology

## 2022-01-13 ENCOUNTER — APPOINTMENT (INPATIENT)
Dept: RADIOLOGY | Facility: HOSPITAL | Age: 72
DRG: 595 | End: 2022-01-13
Payer: COMMERCIAL

## 2022-01-13 PROBLEM — N17.9 AKI (ACUTE KIDNEY INJURY) (HCC): Status: RESOLVED | Noted: 2022-01-10 | Resolved: 2022-01-13

## 2022-01-13 LAB
ANION GAP SERPL CALCULATED.3IONS-SCNC: 6 MMOL/L (ref 4–13)
BACTERIA BLD CULT: ABNORMAL
BUN SERPL-MCNC: 9 MG/DL (ref 5–25)
CALCIUM SERPL-MCNC: 9 MG/DL (ref 8.3–10.1)
CHLORIDE SERPL-SCNC: 108 MMOL/L (ref 100–108)
CO2 SERPL-SCNC: 26 MMOL/L (ref 21–32)
CREAT SERPL-MCNC: 0.78 MG/DL (ref 0.6–1.3)
ERYTHROCYTE [DISTWIDTH] IN BLOOD BY AUTOMATED COUNT: 14.6 % (ref 11.6–15.1)
GFR SERPL CREATININE-BSD FRML MDRD: 75 ML/MIN/1.73SQ M
GLUCOSE SERPL-MCNC: 78 MG/DL (ref 65–140)
GRAM STN SPEC: ABNORMAL
HCT VFR BLD AUTO: 37.7 % (ref 34.8–46.1)
HGB BLD-MCNC: 12 G/DL (ref 11.5–15.4)
MCH RBC QN AUTO: 27.5 PG (ref 26.8–34.3)
MCHC RBC AUTO-ENTMCNC: 31.8 G/DL (ref 31.4–37.4)
MCV RBC AUTO: 87 FL (ref 82–98)
PLATELET # BLD AUTO: 186 THOUSANDS/UL (ref 149–390)
PMV BLD AUTO: 11.6 FL (ref 8.9–12.7)
POTASSIUM SERPL-SCNC: 4 MMOL/L (ref 3.5–5.3)
PROCALCITONIN SERPL-MCNC: 0.26 NG/ML
RBC # BLD AUTO: 4.36 MILLION/UL (ref 3.81–5.12)
SODIUM SERPL-SCNC: 140 MMOL/L (ref 136–145)
WBC # BLD AUTO: 5.2 THOUSAND/UL (ref 4.31–10.16)

## 2022-01-13 PROCEDURE — 80048 BASIC METABOLIC PNL TOTAL CA: CPT | Performed by: FAMILY MEDICINE

## 2022-01-13 PROCEDURE — 84145 PROCALCITONIN (PCT): CPT | Performed by: FAMILY MEDICINE

## 2022-01-13 PROCEDURE — 92526 ORAL FUNCTION THERAPY: CPT

## 2022-01-13 PROCEDURE — 71045 X-RAY EXAM CHEST 1 VIEW: CPT

## 2022-01-13 PROCEDURE — 85027 COMPLETE CBC AUTOMATED: CPT | Performed by: FAMILY MEDICINE

## 2022-01-13 PROCEDURE — 99232 SBSQ HOSP IP/OBS MODERATE 35: CPT | Performed by: FAMILY MEDICINE

## 2022-01-13 RX ORDER — DIPHENHYDRAMINE HCL 25 MG
25 TABLET ORAL EVERY 6 HOURS PRN
Status: DISCONTINUED | OUTPATIENT
Start: 2022-01-13 | End: 2022-01-26 | Stop reason: HOSPADM

## 2022-01-13 RX ADMIN — CIPROFLOXACIN HYDROCHLORIDE 1 DROP: 3 SOLUTION/ DROPS OPHTHALMIC at 21:08

## 2022-01-13 RX ADMIN — GUAIFENESIN 600 MG: 600 TABLET, EXTENDED RELEASE ORAL at 08:31

## 2022-01-13 RX ADMIN — CLOTRIMAZOLE: 1 CREAM TOPICAL at 17:32

## 2022-01-13 RX ADMIN — DOXYCYCLINE 100 MG: 100 CAPSULE ORAL at 08:31

## 2022-01-13 RX ADMIN — APIXABAN 5 MG: 5 TABLET, FILM COATED ORAL at 08:31

## 2022-01-13 RX ADMIN — MICONAZOLE NITRATE: 20 CREAM TOPICAL at 08:33

## 2022-01-13 RX ADMIN — CEFTRIAXONE SODIUM 1000 MG: 10 INJECTION, POWDER, FOR SOLUTION INTRAVENOUS at 20:28

## 2022-01-13 RX ADMIN — PANTOPRAZOLE SODIUM 40 MG: 40 TABLET, DELAYED RELEASE ORAL at 05:25

## 2022-01-13 RX ADMIN — CIPROFLOXACIN HYDROCHLORIDE 1 DROP: 3 SOLUTION/ DROPS OPHTHALMIC at 05:25

## 2022-01-13 RX ADMIN — Medication: at 17:28

## 2022-01-13 RX ADMIN — CIPROFLOXACIN HYDROCHLORIDE 1 DROP: 3 SOLUTION/ DROPS OPHTHALMIC at 13:40

## 2022-01-13 RX ADMIN — CLOBETASOL PROPIONATE: 0.5 CREAM TOPICAL at 17:32

## 2022-01-13 RX ADMIN — DIPHENHYDRAMINE HCL 25 MG: 25 TABLET ORAL at 15:43

## 2022-01-13 RX ADMIN — CLOBETASOL PROPIONATE: 0.5 CREAM TOPICAL at 08:33

## 2022-01-13 RX ADMIN — CLOTRIMAZOLE: 1 CREAM TOPICAL at 08:33

## 2022-01-13 RX ADMIN — METOPROLOL TARTRATE 25 MG: 25 TABLET, FILM COATED ORAL at 08:31

## 2022-01-13 RX ADMIN — HYDROXYCHLOROQUINE SULFATE 200 MG: 200 TABLET, FILM COATED ORAL at 08:33

## 2022-01-13 RX ADMIN — CIPROFLOXACIN HYDROCHLORIDE 1 DROP: 3 SOLUTION/ DROPS OPHTHALMIC at 17:28

## 2022-01-13 RX ADMIN — MICONAZOLE NITRATE: 20 CREAM TOPICAL at 17:29

## 2022-01-13 RX ADMIN — PREDNISONE 5 MG: 5 TABLET ORAL at 08:31

## 2022-01-13 RX ADMIN — GUAIFENESIN 600 MG: 600 TABLET, EXTENDED RELEASE ORAL at 20:28

## 2022-01-13 RX ADMIN — SODIUM CHLORIDE, SODIUM GLUCONATE, SODIUM ACETATE, POTASSIUM CHLORIDE, MAGNESIUM CHLORIDE, SODIUM PHOSPHATE, DIBASIC, AND POTASSIUM PHOSPHATE 50 ML/HR: .53; .5; .37; .037; .03; .012; .00082 INJECTION, SOLUTION INTRAVENOUS at 17:05

## 2022-01-13 RX ADMIN — APIXABAN 5 MG: 5 TABLET, FILM COATED ORAL at 17:27

## 2022-01-13 RX ADMIN — HYDROXYCHLOROQUINE SULFATE 200 MG: 200 TABLET, FILM COATED ORAL at 17:28

## 2022-01-13 RX ADMIN — METOPROLOL TARTRATE 25 MG: 25 TABLET, FILM COATED ORAL at 20:29

## 2022-01-13 RX ADMIN — Medication 1000 UNITS: at 08:31

## 2022-01-13 RX ADMIN — CIPROFLOXACIN HYDROCHLORIDE 1 DROP: 3 SOLUTION/ DROPS OPHTHALMIC at 09:18

## 2022-01-13 NOTE — SPEECH THERAPY NOTE
Speech Language/Pathology    Speech/Language Pathology Progress Note    Patient Name: Suman Marin  PTPSQ'Q Date: 2022     Problem List  Principal Problem:    Sepsis ([facility] )  Active Problems:    Rheumatoid arthritis ([facility] )    GERD (gastroesophageal reflux disease)    Benign essential hypertension    Skin rash    Murmur, cardiac    AMANDA (acute kidney injury) ([facility] )    New [facility]fib ([facility] )    Lethargy    Vomiting       Past Medical History  Past Medical History:   Diagnosis Date    Abnormal thyroid function test     last assessed: 2015     Arthritis     Caries     last assessed: 2016     Edema of right lower extremity     last assessed: 2015     GERD (gastroesophageal reflux disease)     Hypertension     Sarcoid         Past Surgical History  Past Surgical History:   Procedure Laterality Date     SECTION      MULTIPLE TOOTH EXTRACTIONS N/A 2016    Procedure: Surgical extraction of teeth 2, 18, 19, 30, 31; incision and drainage of left subperiosteal abscess ;  Surgeon: Gavi Li DMD;  Location: [facility];  Service:          Subjective:  Pt awake and alert  "I haven't been hungry, but I could try "    Current Diet:  Mechanical soft w/ thin liquids     Objective:  Pt seen for dx dysphagia tx f/u w/ trials of toast and thin liquids  Bite strength is adequate  Mastication of material is timely and effective  No significant difficulties manipulating or transferring material  No significant oral residue  Pt takes sips of thin liquids via straw  Swallows appear timely  No overt s/s aspiration directly timed w/ swallows today  Pt does have intermittent cough throughout session though not suspected related to swallows, pt reports cough has been ongoing  Education provided on benefit of diet upgrade to allow more options for selections as appetite increases, pt interested in upgrade at this time   Messaged physician inquiring about any concerns for aspiration as source of infection and indication to rule out silent aspiration w/ VBS - await response  Assessment:  No s/s oropharyngeal difficulties w/ trials today       Plan/Recommendations:  Regular w/ thin liquids  Frequent and thorough oral care  If ongoing concerns for aspiration, VBS to be completed, otherwise no further ST needs     *Addendum 1325: No ongoing concerns for aspiration per physician, diet to be upgraded and ST to sign off at this time, please re-consult if medically necessary

## 2022-01-14 PROBLEM — L53.9 ERYTHRODERMA: Status: ACTIVE | Noted: 2017-10-27

## 2022-01-14 LAB
ALBUMIN SERPL BCP-MCNC: 2.3 G/DL (ref 3.5–5)
ALP SERPL-CCNC: 57 U/L (ref 46–116)
ALT SERPL W P-5'-P-CCNC: 31 U/L (ref 12–78)
ANION GAP SERPL CALCULATED.3IONS-SCNC: 6 MMOL/L (ref 4–13)
AST SERPL W P-5'-P-CCNC: 31 U/L (ref 5–45)
B PARAP IS1001 DNA NPH QL NAA+NON-PROBE: NOT DETECTED
B PERT.PT PRMT NPH QL NAA+NON-PROBE: NOT DETECTED
BILIRUB SERPL-MCNC: 0.6 MG/DL (ref 0.2–1)
BUN SERPL-MCNC: 9 MG/DL (ref 5–25)
C PNEUM DNA NPH QL NAA+NON-PROBE: NOT DETECTED
CALCIUM ALBUM COR SERPL-MCNC: 10.3 MG/DL (ref 8.3–10.1)
CALCIUM SERPL-MCNC: 8.9 MG/DL (ref 8.3–10.1)
CHLORIDE SERPL-SCNC: 106 MMOL/L (ref 100–108)
CO2 SERPL-SCNC: 26 MMOL/L (ref 21–32)
CREAT SERPL-MCNC: 0.87 MG/DL (ref 0.6–1.3)
ERYTHROCYTE [DISTWIDTH] IN BLOOD BY AUTOMATED COUNT: 14.6 % (ref 11.6–15.1)
FLUAV RNA NPH QL NAA+NON-PROBE: NOT DETECTED
FLUBV RNA NPH QL NAA+NON-PROBE: NOT DETECTED
GFR SERPL CREATININE-BSD FRML MDRD: 66 ML/MIN/1.73SQ M
GLUCOSE SERPL-MCNC: 81 MG/DL (ref 65–140)
HADV DNA NPH QL NAA+NON-PROBE: NOT DETECTED
HCT VFR BLD AUTO: 38 % (ref 34.8–46.1)
HGB BLD-MCNC: 11.8 G/DL (ref 11.5–15.4)
HMPV RNA NPH QL NAA+NON-PROBE: NOT DETECTED
HPIV 1+2+3+4 RNA NPH QL NAA+NON-PROBE: NOT DETECTED
HPIV 1+2+3+4 RNA NPH QL NAA+NON-PROBE: NOT DETECTED
M PNEUMO DNA NPH QL NAA+NON-PROBE: NOT DETECTED
MCH RBC QN AUTO: 27.3 PG (ref 26.8–34.3)
MCHC RBC AUTO-ENTMCNC: 31.1 G/DL (ref 31.4–37.4)
MCV RBC AUTO: 88 FL (ref 82–98)
PLATELET # BLD AUTO: 204 THOUSANDS/UL (ref 149–390)
PMV BLD AUTO: 11.2 FL (ref 8.9–12.7)
POTASSIUM SERPL-SCNC: 3.9 MMOL/L (ref 3.5–5.3)
PROCALCITONIN SERPL-MCNC: 0.22 NG/ML
PROT SERPL-MCNC: 6.6 G/DL (ref 6.4–8.2)
RBC # BLD AUTO: 4.33 MILLION/UL (ref 3.81–5.12)
RSV RNA NPH QL NAA+NON-PROBE: NOT DETECTED
RV+EV RNA NPH QL NAA+NON-PROBE: NOT DETECTED
SODIUM SERPL-SCNC: 138 MMOL/L (ref 136–145)
WBC # BLD AUTO: 6.39 THOUSAND/UL (ref 4.31–10.16)

## 2022-01-14 PROCEDURE — 97530 THERAPEUTIC ACTIVITIES: CPT

## 2022-01-14 PROCEDURE — 87040 BLOOD CULTURE FOR BACTERIA: CPT | Performed by: FAMILY MEDICINE

## 2022-01-14 PROCEDURE — 87798 DETECT AGENT NOS DNA AMP: CPT | Performed by: STUDENT IN AN ORGANIZED HEALTH CARE EDUCATION/TRAINING PROGRAM

## 2022-01-14 PROCEDURE — 97535 SELF CARE MNGMENT TRAINING: CPT

## 2022-01-14 PROCEDURE — 80053 COMPREHEN METABOLIC PANEL: CPT | Performed by: FAMILY MEDICINE

## 2022-01-14 PROCEDURE — 87581 M.PNEUMON DNA AMP PROBE: CPT | Performed by: STUDENT IN AN ORGANIZED HEALTH CARE EDUCATION/TRAINING PROGRAM

## 2022-01-14 PROCEDURE — 87486 CHLMYD PNEUM DNA AMP PROBE: CPT | Performed by: STUDENT IN AN ORGANIZED HEALTH CARE EDUCATION/TRAINING PROGRAM

## 2022-01-14 PROCEDURE — 99223 1ST HOSP IP/OBS HIGH 75: CPT | Performed by: STUDENT IN AN ORGANIZED HEALTH CARE EDUCATION/TRAINING PROGRAM

## 2022-01-14 PROCEDURE — 84145 PROCALCITONIN (PCT): CPT | Performed by: FAMILY MEDICINE

## 2022-01-14 PROCEDURE — 97116 GAIT TRAINING THERAPY: CPT

## 2022-01-14 PROCEDURE — 97110 THERAPEUTIC EXERCISES: CPT

## 2022-01-14 PROCEDURE — 85027 COMPLETE CBC AUTOMATED: CPT | Performed by: FAMILY MEDICINE

## 2022-01-14 PROCEDURE — 87633 RESP VIRUS 12-25 TARGETS: CPT | Performed by: STUDENT IN AN ORGANIZED HEALTH CARE EDUCATION/TRAINING PROGRAM

## 2022-01-14 PROCEDURE — 97112 NEUROMUSCULAR REEDUCATION: CPT

## 2022-01-14 RX ORDER — FUROSEMIDE 10 MG/ML
40 INJECTION INTRAMUSCULAR; INTRAVENOUS ONCE
Status: COMPLETED | OUTPATIENT
Start: 2022-01-14 | End: 2022-01-14

## 2022-01-14 RX ADMIN — APIXABAN 5 MG: 5 TABLET, FILM COATED ORAL at 08:39

## 2022-01-14 RX ADMIN — APIXABAN 5 MG: 5 TABLET, FILM COATED ORAL at 17:55

## 2022-01-14 RX ADMIN — CIPROFLOXACIN HYDROCHLORIDE 1 DROP: 3 SOLUTION/ DROPS OPHTHALMIC at 21:22

## 2022-01-14 RX ADMIN — CLOTRIMAZOLE: 1 CREAM TOPICAL at 18:03

## 2022-01-14 RX ADMIN — CIPROFLOXACIN HYDROCHLORIDE 1 DROP: 3 SOLUTION/ DROPS OPHTHALMIC at 11:19

## 2022-01-14 RX ADMIN — GUAIFENESIN 600 MG: 600 TABLET, EXTENDED RELEASE ORAL at 08:38

## 2022-01-14 RX ADMIN — CLOBETASOL PROPIONATE: 0.5 CREAM TOPICAL at 08:46

## 2022-01-14 RX ADMIN — ACETAMINOPHEN 650 MG: 325 TABLET, FILM COATED ORAL at 15:11

## 2022-01-14 RX ADMIN — Medication 1000 UNITS: at 08:38

## 2022-01-14 RX ADMIN — FUROSEMIDE 40 MG: 10 INJECTION, SOLUTION INTRAMUSCULAR; INTRAVENOUS at 15:42

## 2022-01-14 RX ADMIN — METOPROLOL TARTRATE 25 MG: 25 TABLET, FILM COATED ORAL at 08:38

## 2022-01-14 RX ADMIN — CLOTRIMAZOLE: 1 CREAM TOPICAL at 08:46

## 2022-01-14 RX ADMIN — CIPROFLOXACIN HYDROCHLORIDE 1 DROP: 3 SOLUTION/ DROPS OPHTHALMIC at 14:02

## 2022-01-14 RX ADMIN — CIPROFLOXACIN HYDROCHLORIDE 1 DROP: 3 SOLUTION/ DROPS OPHTHALMIC at 05:56

## 2022-01-14 RX ADMIN — Medication: at 17:58

## 2022-01-14 RX ADMIN — MICONAZOLE NITRATE: 20 CREAM TOPICAL at 08:47

## 2022-01-14 RX ADMIN — PANTOPRAZOLE SODIUM 40 MG: 40 TABLET, DELAYED RELEASE ORAL at 05:56

## 2022-01-14 RX ADMIN — CIPROFLOXACIN HYDROCHLORIDE 1 DROP: 3 SOLUTION/ DROPS OPHTHALMIC at 17:55

## 2022-01-14 RX ADMIN — CLOBETASOL PROPIONATE: 0.5 CREAM TOPICAL at 18:03

## 2022-01-14 RX ADMIN — MICONAZOLE NITRATE: 20 CREAM TOPICAL at 17:58

## 2022-01-14 RX ADMIN — HYDROXYCHLOROQUINE SULFATE 200 MG: 200 TABLET, FILM COATED ORAL at 17:55

## 2022-01-14 RX ADMIN — CEFTRIAXONE SODIUM 2000 MG: 10 INJECTION, POWDER, FOR SOLUTION INTRAVENOUS at 20:21

## 2022-01-14 RX ADMIN — GUAIFENESIN 600 MG: 600 TABLET, EXTENDED RELEASE ORAL at 20:14

## 2022-01-14 RX ADMIN — HYDROXYCHLOROQUINE SULFATE 200 MG: 200 TABLET, FILM COATED ORAL at 08:47

## 2022-01-14 RX ADMIN — PREDNISONE 5 MG: 5 TABLET ORAL at 08:38

## 2022-01-14 NOTE — ASSESSMENT & PLAN NOTE
· Suspect from sepsis  · Head CT is unremarkable for acute pathology  · Neuro checks  · Lethargy significantly better

## 2022-01-14 NOTE — PHYSICAL THERAPY NOTE
Physical Therapy Progress Note     01/14/22 1000   PT Last Visit   PT Visit Date 01/14/22   Note Type   Note Type Treatment   Pain Assessment   Pain Assessment Tool 0-10   Pain Score No Pain   Restrictions/Precautions   Other Precautions Pain; Fall Risk; Chair Alarm; Bed Alarm   Subjective   Subjective The pt  states that she needs her slippers in order to walk as she is afraid of falling  She would like to get to the commode  Bed Mobility   Supine to Sit 3  Moderate assistance   Additional items Assist x 1; Increased time required;Verbal cues;LE management   Sit to Supine 3  Moderate assistance   Additional items Assist x 1; Increased time required;Verbal cues;LE management   Transfers   Sit to Stand 3  Moderate assistance   Additional items Assist x 1;Assist x 2; Increased time required;Verbal cues  (Progressed from Ax2 to Ax1 of 11 trials )   Stand to Sit 3  Moderate assistance   Additional items Assist x 1;Assist x 2; Increased time required;Verbal cues  (Progressed from Ax2 to Ax1 )   Ambulation/Elevation   Gait pattern Excessively slow; Step to;Short stride; Inconsistent chris; Shuffling;Decreased foot clearance; Forward Flexion   Gait Assistance 3  Moderate assist   Additional items Assist x 1;Verbal cues; Tactile cues   Assistive Device Rolling walker   Distance 3 feet, 4 feet x 4, 3 feet, 6 feet x 2  Balance   Static Sitting Fair   Dynamic Sitting Fair -   Static Standing Poor +   Ambulatory Poor   Activity Tolerance   Activity Tolerance Patient tolerated treatment well;Patient limited by fatigue   Nurse [address] [place] [address] Ne, RN  Exercises   TKR Sitting;Supine;Bilateral;AROM;5 reps;10 reps  (x 2 sets )   Assessment   Prognosis Fair   Problem List Decreased strength;Decreased endurance; Impaired balance;Decreased mobility   Assessment The pt  continues to require assistance for all aspects of mobility, but she was able to progress to assistance of one person   She requires extensive time in order to ambulate any distance, and she has a markedly forward-flexed posture that she is unable to correct for  She required going to the commode three separate times during the session, but she had improving mobility with each time  She also was able to progress her standing tolerance to two minutes over several trials  She ultimately wanted to return to the bed despite education about the benefit of sitting up in the chair during the day  She remains limited from her baseline, and inpatient rehabilitation is recommended in order to facilitate her functional progress  Barriers to Discharge Inaccessible home environment;Decreased caregiver support   Goals   Patient Goals To go home  STG Expiration Date 01/25/22   PT Treatment Day 1   Plan   Treatment/Interventions Functional transfer training;LE strengthening/ROM; Therapeutic exercise; Endurance training;Patient/family training;Bed mobility;Gait training   Progress Slow progress, decreased activity tolerance   PT Frequency 3-5x/wk   Recommendation   PT Discharge Recommendation Post acute rehabilitation services   Equipment Recommended [facility] Recommended Wheeled walker   AM-[facility] Basic Mobility Inpatient   Turning in Bed Without Bedrails 2   Lying on Back to Sitting on Edge of Flat Bed 2   Moving Bed to Chair 2   Standing Up From Chair 2   Walk in Room 2   Climb 3-5 Stairs 1   Basic Mobility Inpatient Raw Score 11   Basic Mobility Standardized Score 30 25   Highest Level Of Mobility   -HLM Goal 4: Move to chair/commode   J[facility]-HLM Highest Level of Mobility 5: Stand (1 or more minutes)   -HLM Goal Achieved Yes     An AM-P[facility] Basic Mobility standardized score less than 40 78 suggests the patient may benefit from discharge to post-acute rehab services      Venkata Jamil PTA

## 2022-01-14 NOTE — CONSULTS
Consultation - Infectious Disease   Jm Al 66 y o  female MRN: [id number]  [facility] Encounter: [id number]      IMPRESSION & RECOMMENDATIONS:     1  Sepsis-evolving since admission  Tachycardia, fevers, leukocytosis on admission  Overall improving, but continues to have low grade fever, tachycardia, and persistent cough  CT imaging on admission did not show evidence of pneumonia  Strep pneumoniae urine antigen positive, consider bronchitis or sinusitis  CT A/P no abnormalities  COVID/flu/RSV PCR negative  Has chronic skin erythroderma, but does not appear to have areas of cellulitis  No other clear source of infection  Procalcitonin improving  Consider viral etiology given prolonged cough, sinus congestion, and eye discharge in immunocompromised patient  Consider drug fever    -for now, continue Ceftriaxone but increase dose to 2g IV q24hr given weight  Anticipate a 7 day course; if continues to improve can switch to cefuroxime   -check RP2 for alternative viral etiology   -supportive care for cough   -if symptoms continue, repeat COVID PCR and facial CT to look for dental infection/worsening sinus infection    2  Coagulase negative staph in blood culture  1/2 sets on admission growing coagulase negative staph  Likely contaminant  Repeat cultures no growth  -no further treatment needed    3  Erythroderma  Patient with worsening skin rash over last 5 years  Did not improve on Humira  Status post skin biopsy 1/12 by Dermatology, pathology most consistent with pustular psoriasis  -Dermatology follow up   -local skin care   -sent HTLV1    4  Rheumatoid arthritis  On plaquenil and prednisone  Patient is immunocompromised  5  Afib   New onset  Started on metoprolol and Eliquis  6  AMANDA   Present on admission in setting of #1  Now improved    I have discussed the above management plan in detail with the primary service  ID will follow      I have performed an extensive review of the medical records in [facility] [address] including review of the notes, radiographs, and laboratory results     HISTORY OF PRESENT ILLNESS:  Reason for Consult: fever  HPI: Tariq Ayala is a 66 y o  woman with a history of HTN and rheumatoid arthritis on plaquenil and prednisone who was brought to the [facility] ED 1/10 by her daughter due to generalized weakness, confusion, and cough  The patient and daughter state that her cough was present for about a month, but recently became more productive  She was also having thick discharge from her eyes and sinus pressure last week  On presentation, the patient was febrile to 101 1 with a WBC count of 11 15  She was found to be in Afib with RVR with HR in the 110s-120s  COVID/flu/RSV was negative  CXR and CT chest showed no acute cardiopulmonary disease  CT head showed sinusitis  CT A/P without any acute infectious abnormalities  She was started on doxycycline and Ceftriaxone  1/2 blood cultures from admission are growing coagulase negative staph  Repeat blood cultures are negative  Strep pneumoniae urine antigen is positive  She continues to have low grade fever with Tmax 100 8, intermittent confusion, and persistent cough  ID is consulted for further management  The patient has had a whole body rash for the past 5 years, that reportedly started   After a course of antibiotics  Her skin is very dry and itchy  The patient was on Humira until a month ago for RA, which did not improve the rash  She was seen by Dermatology 1/12 and a skin biopsy was performed  Per the patient and her daughter, she is overall improving since admission but still gets confused at night  Her cough is persistent and she is not able to bring up a lot of mucus  She denies fever, chills, change in skin rash or increase in erythema  Patient was born in [place]     [address]:  A complete review of systems is negative other than that noted in the HPI      PAST MEDICAL HISTORY:  Past Medical History: Diagnosis Date    Abnormal thyroid function test     last assessed: 2015     Arthritis     Caries     last assessed: 2016     Edema of right lower extremity     last assessed: 2015     GERD (gastroesophageal reflux disease)     Hypertension     Sarcoid      Past Surgical History:   Procedure Laterality Date     SECTION      MULTIPLE TOOTH EXTRACTIONS N/A 2016    Procedure: Surgical extraction of teeth 2, 18, 19, 30, 31; incision and drainage of left subperiosteal abscess ;  Surgeon: Linus Hernandez DMD;  Location: [facility];  Service:        FAMILY HISTORY:  Non-contributory    SOCIAL HISTORY:  Social History   Social History     Substance and Sexual Activity   Alcohol Use No     Social History     Substance and Sexual Activity   Drug Use No     Social History     Tobacco Use   Smoking Status Never Smoker   Smokeless Tobacco Never Used       ALLERGIES:  Allergies   Allergen Reactions    Methotrexate Derivatives     Shellfish-Derived Products - Food Allergy Itching    Amoxicillin Rash    Ampicillin-Sulbactam Sodium Rash       MEDICATIONS:  All current active medications have been reviewed  PHYSICAL EXAM:  Temp:  [99 1 °F (37 3 °C)-100 8 °F (38 2 °C)] 100 8 °F (38 2 °C)  HR:  [] 109  Resp:  [16] 16  BP: (102-135)/(53-64) 102/53  SpO2:  [95 %-97 %] 95 %  Temp (24hrs), Av 9 °F (37 7 °C), Min:99 1 °F (37 3 °C), Max:100 8 °F (38 2 °C)  Current: Temperature: (!) 100 8 °F (38 2 °C)    Intake/Output Summary (Last 24 hours) at 2022 1637  Last data filed at 2022 1511  Gross per 24 hour   Intake 2316 17 ml   Output 1000 ml   Net 1316 17 ml       General Appearance:  Chronically ill appearing female, in no distress   Head:  Normocephalic, without obvious abnormality, atraumatic  No sinus tenderness   Eyes:  Conjunctiva pink and sclera anicteric, both eyes   Peeling skin around both eyes   Nose: Nares normal, mucosa normal, no drainage   Throat: Oropharynx moist without lesions   Neck: Supple, symmetrical, no adenopathy, no tenderness/mass/nodules   Back:   Symmetric, no curvature, ROM normal, no CVA tenderness   Lungs:   Clear to auscultation bilaterally, respirations unlabored   Chest Wall:  No tenderness or deformity   Heart:  RRR; no murmur, rub or gallop   Abdomen:   Soft, non-tender, non-distended, positive bowel sounds    Extremities: No cyanosis, clubbing or edema  No joint swelling   Skin: No rashes or lesions  No draining wounds noted  Diffuse skin desquamation with erythroderma    Lymph nodes: Cervical, supraclavicular nodes normal   Neurologic: Alert and oriented times 3       LABS, IMAGING, & OTHER STUDIES:  Lab Results:  I have personally reviewed pertinent labs  Results from last 7 days   Lab Units 01/14/22  0614 01/13/22  0536 01/12/22  0634   WBC Thousand/uL 6 39 5 20 6 53   HEMOGLOBIN g/dL 11 8 12 0 12 5   PLATELETS Thousands/uL 204 186 169     Results from last 7 days   Lab Units 01/14/22  0614 01/13/22  0536 01/13/22  0536 01/12/22  0634 01/12/22  0634 01/11/22  0506 01/11/22  0506   SODIUM mmol/L 138  --  140  --  141   < > 141   POTASSIUM mmol/L 3 9   < > 4 0   < > 3 8   < > 4 2   CHLORIDE mmol/L 106   < > 108   < > 108   < > 109*   CO2 mmol/L 26   < > 26   < > 25   < > 22   BUN mg/dL 9   < > 9   < > 15   < > 22   CREATININE mg/dL 0 87   < > 0 78   < > 0 84   < > 1 13   EGFR ml/min/1 73sq m 66   < > 75   < > 69   < > 48   CALCIUM mg/dL 8 9   < > 9 0   < > 8 8   < > 9 2   AST U/L 31  --   --   --  25  --  39   ALT U/L 31  --   --    < > 21   < > 27   ALK PHOS U/L 57  --   --    < > 58   < > 69    < > = values in this interval not displayed  Results from last 7 days   Lab Units 01/12/22  0634 01/10/22  2025 01/10/22  1849   BLOOD CULTURE  No Growth at 48 hrs  No Growth at 48 hrs  Staphylococcus coagulase negative* No Growth at 72 hrs     GRAM STAIN RESULT   --  Gram positive cocci in clusters*  --      Results from last 7 days Lab Units 01/13/22  0536 01/12/22  0634 01/11/22  0506 01/10/22  1642   PROCALCITONIN ng/ml 0 26* 0 42* 0 46* 0 33*       Imaging Studies:   I have personally reviewed pertinent imaging study reports and images in PACS  CT Chest-no consolidations    Other Studies:   I have personally reviewed pertinent reports

## 2022-01-14 NOTE — ASSESSMENT & PLAN NOTE
· Patient noted to have diffuse rash on her skin  Skin is dry and peeling  · No definite diagnosis  Has been going on for the past 5 years started after being on IV antibiotics for a dental infection  · Family reported that the diagnosis of psoriasis was ruled out since the patient did not get better while on Humira for rheumatoid arthritis  · Dermatology evaluation appreciated  Erythroderma  Biopsy done -results pending    Outpatient follow-up with the dermatology

## 2022-01-14 NOTE — PLAN OF CARE
Problem: MOBILITY - ADULT  Goal: Maintain or return to baseline ADL function  Description: INTERVENTIONS:  -  Assess patient's ability to carry out ADLs; assess patient's baseline for ADL function and identify physical deficits which impact ability to perform ADLs (bathing, care of mouth/teeth, toileting, grooming, dressing, etc )  - Assess/evaluate cause of self-care deficits   - Assess range of motion  - Assess patient's mobility; develop plan if impaired  - Assess patient's need for assistive devices and provide as appropriate  - Encourage maximum independence but intervene and supervise when necessary  - Involve family in performance of ADLs  - Assess for home care needs following discharge   - Consider OT consult to assist with ADL evaluation and planning for discharge  - Provide patient education as appropriate  Outcome: Progressing  Goal: Maintains/Returns to pre admission functional level  Description: INTERVENTIONS:  - Perform BMAT or MOVE assessment daily    - Set and communicate daily mobility goal to care team and patient/family/caregiver  - Collaborate with rehabilitation services on mobility goals if consulted  - Perform Range of Motion   times a day  - Reposition patient every   hours    - Dangle patient   times a day  - Stand patient   times a day  - Ambulate patient   times a day  - Out of bed to chair   times a day   - Out of bed for meals   times a day  - Out of bed for toileting  - Record patient progress and toleration of activity level   Outcome: Progressing     Problem: Prexisting or High Potential for Compromised Skin Integrity  Goal: Skin integrity is maintained or improved  Description: INTERVENTIONS:  - Identify patients at risk for skin breakdown  - Assess and monitor skin integrity  - Assess and monitor nutrition and hydration status  - Monitor labs   - Assess for incontinence   - Turn and reposition patient  - Assist with mobility/ambulation  - Relieve pressure over bony prominences  - Avoid friction and shearing  - Provide appropriate hygiene as needed including keeping skin clean and dry  - Evaluate need for skin moisturizer/barrier cream  - Collaborate with interdisciplinary team   - Patient/family teaching  - Consider wound care consult   Outcome: Progressing     Problem: Potential for Falls  Goal: Patient will remain free of falls  Description: INTERVENTIONS:  - Educate patient/family on patient safety including physical limitations  - Instruct patient to call for assistance with activity   - Consult OT/PT to assist with strengthening/mobility   - Keep Call bell within reach  - Keep bed low and locked with side rails adjusted as appropriate  - Keep care items and personal belongings within reach  - Initiate and maintain comfort rounds  - Make Fall Risk Sign visible to staff  - Offer Toileting every   Hours, in advance of need  - Initiate/Maintain  alarm  - Obtain necessary fall risk management equipment:    - Apply yellow socks and bracelet for high fall risk patients  - Consider moving patient to room near nurses station  Outcome: Progressing     Problem: Nutrition/Hydration-ADULT  Goal: Nutrient/Hydration intake appropriate for improving, restoring or maintaining nutritional needs  Description: Monitor and assess patient's nutrition/hydration status for malnutrition  Collaborate with interdisciplinary team and initiate plan and interventions as ordered  Monitor patient's weight and dietary intake as ordered or per policy  Utilize nutrition screening tool and intervene as necessary  Determine patient's food preferences and provide high-protein, high-caloric foods as appropriate       INTERVENTIONS:  - Monitor oral intake, urinary output, labs, and treatment plans  - Assess nutrition and hydration status and recommend course of action  - Evaluate amount of meals eaten  - Assist patient with eating if necessary   - Allow adequate time for meals  - Recommend/ encourage appropriate diets, oral nutritional supplements, and vitamin/mineral supplements  - Order, calculate, and assess calorie counts as needed  - Recommend, monitor, and adjust tube feedings and TPN/PPN based on assessed needs  - Assess need for intravenous fluids  - Provide specific nutrition/hydration education as appropriate  - Include patient/family/caregiver in decisions related to nutrition  Outcome: Progressing     Problem: PAIN - ADULT  Goal: Verbalizes/displays adequate comfort level or baseline comfort level  Description: Interventions:  - Encourage patient to monitor pain and request assistance  - Assess pain using appropriate pain scale  - Administer analgesics based on type and severity of pain and evaluate response  - Implement non-pharmacological measures as appropriate and evaluate response  - Consider cultural and social influences on pain and pain management  - Notify physician/advanced practitioner if interventions unsuccessful or patient reports new pain  Outcome: Progressing     Problem: INFECTION - ADULT  Goal: Absence or prevention of progression during hospitalization  Description: INTERVENTIONS:  - Assess and monitor for signs and symptoms of infection  - Monitor lab/diagnostic results  - Monitor all insertion sites, i e  indwelling lines, tubes, and drains  - Monitor endotracheal if appropriate and nasal secretions for changes in amount and color  - [place] appropriate cooling/warming therapies per order  - Administer medications as ordered  - Instruct and encourage patient and family to use good hand hygiene technique  - Identify and instruct in appropriate isolation precautions for identified infection/condition  Outcome: Progressing     Problem: SAFETY ADULT  Goal: Maintain or return to baseline ADL function  Description: INTERVENTIONS:  -  Assess patient's ability to carry out ADLs; assess patient's baseline for ADL function and identify physical deficits which impact ability to perform ADLs (bathing, care of mouth/teeth, toileting, grooming, dressing, etc )  - Assess/evaluate cause of self-care deficits   - Assess range of motion  - Assess patient's mobility; develop plan if impaired  - Assess patient's need for assistive devices and provide as appropriate  - Encourage maximum independence but intervene and supervise when necessary  - Involve family in performance of ADLs  - Assess for home care needs following discharge   - Consider OT consult to assist with ADL evaluation and planning for discharge  - Provide patient education as appropriate  Outcome: Progressing  Goal: Maintains/Returns to pre admission functional level  Description: INTERVENTIONS:  - Perform BMAT or MOVE assessment daily    - Set and communicate daily mobility goal to care team and patient/family/caregiver  - Collaborate with rehabilitation services on mobility goals if consulted  - Perform Range of Motion    times a day  - Reposition patient every   hours    - Dangle patient   times a day  - Stand patient   times a day  - Ambulate patient   times a day  - Out of bed to chair   times a day   - Out of bed for meals   times a day  - Out of bed for toileting  - Record patient progress and toleration of activity level   Outcome: Progressing  Goal: Patient will remain free of falls  Description: INTERVENTIONS:  - Educate patient/family on patient safety including physical limitations  - Instruct patient to call for assistance with activity   - Consult OT/PT to assist with strengthening/mobility   - Keep Call bell within reach  - Keep bed low and locked with side rails adjusted as appropriate  - Keep care items and personal belongings within reach  - Initiate and maintain comfort rounds  - Make Fall Risk Sign visible to staff  - Offer Toileting every   Hours, in advance of need  - Initiate/Maintain  alarm  - Obtain necessary fall risk management equipment:      - Apply yellow socks and bracelet for high fall risk patients  - Consider moving patient to room near nurses station  Outcome: Progressing     Problem: DISCHARGE PLANNING  Goal: Discharge to home or other facility with appropriate resources  Description: INTERVENTIONS:  - Identify barriers to discharge w/patient and caregiver  - Arrange for needed discharge resources and transportation as appropriate  - Identify discharge learning needs (meds, wound care, etc )  - Arrange for interpretive services to assist at discharge as needed  - Refer to Case Management Department for coordinating discharge planning if the patient needs post-hospital services based on physician/advanced practitioner order or complex needs related to functional status, cognitive ability, or social support system  Outcome: Progressing     Problem: Knowledge Deficit  Goal: Patient/family/caregiver demonstrates understanding of disease process, treatment plan, medications, and discharge instructions  Description: Complete learning assessment and assess knowledge base    Interventions:  - Provide teaching at level of understanding  - Provide teaching via preferred learning methods  Outcome: Progressing

## 2022-01-14 NOTE — PROGRESS NOTES
[address] 1950, 66 y o  female MRN: [id number]  Unit/Bed#: [facility] 531-01 Encounter: [id number]  Primary Care Provider: Marleny Wu DO   Date and time admitted to hospital: 1/10/2022 10:11 AM    * Sepsis [facility])  Assessment & Plan  · Evolving since admission  · Patient with fever and tachycardia  · Appears weak, lethargic, + cough  · Send Blood cultures, check UA, CXR negative - chest CT is negative for any acute pathology  CT abdomen is rather unremarkable   · COVID/flu/RSV negative  · procalcitonin 0 33-procalcitonin today is 0 4  · 1/2 sets of blood culture came back positive for Gram-positive cocci in [id number] Staph likely contaminant  Will discontinue  · Follow-up on final culture  May need Infectious Disease evaluation if it is a true infection  · Continue with the antibiotics and recheck procalcitonin vancomycin and follow-up on the final culture  · Urine strep antigen is positive-discontinue doxycycline and continue with ceftriaxone-patient has previous pencil in allergy and tolerated ceftriaxone  · Patient with cough with expectoration-obtain sputum culture    Vomiting  Assessment & Plan  · Appears to be resolved  Patient tolerating diet      Lethargy  Assessment & Plan  · Suspect from sepsis  · Head CT is unremarkable for acute pathology  · Neuro checks  · Lethargy significantly better    New onset a-fib [facility])  Assessment & Plan  · On presentation was in afib RVR with -120s  · After IV lopressor x1, converted to NSR  · Seen by cardio -> cont lopressor 25 BID, switched to PO eliquis  · Will need eliquis price check at the time of discharge  · Patient remained in sinus rhythm    Murmur, cardiac  Assessment & Plan  · Noticed by PCP, was ordered OP echo  · IP echo-no significant valvular abnormality    Skin rash  Assessment & Plan  · Patient noted to have diffuse rash on her skin    Skin is dry and peeling  · No definite diagnosis  Has been going on for the past 5 years started after being on IV antibiotics for a dental infection  · Family reported that the diagnosis of psoriasis was ruled out since the patient did not get better while on Humira for rheumatoid arthritis  · Dermatology evaluation appreciated  Erythroderma  Biopsy done -results pending  Outpatient follow-up with the dermatology    Benign essential hypertension  Assessment & Plan  · Hold amlodipine as starting metoprolol for rate control  · Likely restart back on the amlodipine soon    Rheumatoid arthritis (HCC)  Assessment & Plan  · Continue plaquenil, prednisone 5 mg QD    AMANDA (acute kidney injury) (HCC)-resolved as of 2022  Assessment & Plan  · BL Cr 0 8-0 9  · Creatinine down to 0 8   · Resolved      VTE Pharmacologic Prophylaxis:   Pharmacologic: Apixaban (Eliquis)  Mechanical VTE Prophylaxis in Place: Yes    Patient Centered Rounds: I have performed bedside rounds with nursing staff today  Discussions with Specialists or Other Care Team Provider:     Education and Discussions with Family / Patient:  Patient    Time Spent for Care: 30 minutes  More than 50% of total time spent on counseling and coordination of care as described above  Current Length of Stay: 3 day(s)    Current Patient Status: Inpatient   Certification Statement:  Patient need continued inpatient stay secondary to persistent cough/low-grade fever    Discharge Plan:     Code Status: Level 1 - Full Code      Subjective:   Patient seen and examined  Reported that she is feeling better  Patient reported that she is tolerating the diet better    Patient appears to be more interactive and appropriate with her interaction  Still with cough but no significant expectoration  Objective:     Vitals:   Temp (24hrs), Av 7 °F (37 6 °C), Min:99 3 °F (37 4 °C), Max:100 °F (37 8 °C)    Temp:  [99 3 °F (37 4 °C)-100 °F (37 8 °C)] 99 9 °F (37 7 °C)  HR:  [] 93  Resp:  [18-19] 19  BP: (133-136)/(59-62) 133/62  SpO2:  [96 %-97 %] 97 %  Body mass index is 44 16 kg/m²  Input and Output Summary (last 24 hours): Intake/Output Summary (Last 24 hours) at 1/13/2022 1914  Last data filed at 1/13/2022 1705  Gross per 24 hour   Intake 1170 84 ml   Output 1079 ml   Net 91 84 ml       Physical Exam:     Physical Exam  Constitutional:       General: She is not in acute distress  HENT:      Head: Normocephalic  Nose: Nose normal    Eyes:      General: No scleral icterus  Cardiovascular:      Rate and Rhythm: Normal rate and regular rhythm  Pulses: Normal pulses  Pulmonary:      Comments: Decreased breath sounds bilateral   Rhonchi present  Abdominal:      General: Abdomen is flat  There is no distension  Musculoskeletal:         General: Normal range of motion  Cervical back: Normal range of motion  Skin:     Comments: Skin rash with diffuse peeling   Neurological:      Mental Status: She is alert  Comments: Patient is more awake alert interactive         Additional Data:     Labs:    Results from last 7 days   Lab Units 01/13/22  0536 01/12/22  0634 01/11/22  0506   WBC Thousand/uL 5 20   < > 8 95   HEMOGLOBIN g/dL 12 0   < > 14 2   HEMATOCRIT % 37 7   < > 45 5   PLATELETS Thousands/uL 186   < > 194   NEUTROS PCT %  --   --  79*   LYMPHS PCT %  --   --  11*   MONOS PCT %  --   --  8   EOS PCT %  --   --  2    < > = values in this interval not displayed  Results from last 7 days   Lab Units 01/13/22  0536 01/12/22  0634 01/12/22  0634   POTASSIUM mmol/L 4 0   < > 3 8   CHLORIDE mmol/L 108   < > 108   CO2 mmol/L 26   < > 25   BUN mg/dL 9   < > 15   CREATININE mg/dL 0 78   < > 0 84   CALCIUM mg/dL 9 0   < > 8 8   ALK PHOS U/L  --   --  58   ALT U/L  --   --  21   AST U/L  --   --  25    < > = values in this interval not displayed  Results from last 7 days   Lab Units 01/10/22  1043   INR  1 14       * I Have Reviewed All Lab Data Listed Above    * Additional Pertinent Lab Tests Reviewed: She 64 Admission Reviewed    Imaging:    Imaging Reports Reviewed Today Include:   Imaging Personally Reviewed by Myself Includes:      Recent Cultures (last 7 days):     Results from last 7 days   Lab Units 01/12/22  0634 01/10/22  2025 01/10/22  1849   BLOOD CULTURE  No Growth at 24 hrs  No Growth at 24 hrs  Staphylococcus coagulase negative* No Growth at 48 hrs     GRAM STAIN RESULT   --  Gram positive cocci in clusters*  --        Last 24 Hours Medication List:   Current Facility-Administered Medications   Medication Dose Route Frequency Provider Last Rate    acetaminophen  650 mg Oral Q4H PRN Viktoriya Pena MD      albuterol  2 puff Inhalation Q4H PRN Hong Reyna MD      ammonium lactate   Topical BID PRN Hong Reyna MD      apixaban  5 mg Oral BID [place], DO      benzonatate  100 mg Oral TID PRN Hong Reyna MD      cefTRIAXone  1,000 mg Intravenous Q24H Viktoriya Pena MD 1,000 mg (01/12/22 7854)    cholecalciferol  1,000 Units Oral Daily Viktoriya Pena MD      ciprofloxacin  1 drop Both Eyes Q4H While Awake Hong Reyna MD      clobetasol   Topical BID Viktoriya Pena MD      clotrimazole   Topical BID Viktoriya Pena MD      diphenhydrAMINE  25 mg Oral Q6H PRN Hong Reyna MD      guaiFENesin  600 mg Oral Q12H Miroslava Alaniz MD      hydrocodone-chlorpheniramine polistirex  5 mL Oral Q12H PRN Hong Reyna MD      hydroxychloroquine  200 mg Oral BID Viktoriya Pena MD      metoprolol  5 mg Intravenous Q6H PRN Viktoriya Pena MD      metoprolol tartrate  25 mg Oral Q12H [address] [place], [place]      MAGALY ANTIFUNGAL   Topical BID Hong Reyna MD      multi-electrolyte  50 mL/hr Intravenous Continuous Hong Reyna MD 50 mL/hr (01/13/22 1705)    pantoprazole  40 mg Oral Early Morning Viktoriya Pena MD      predniSONE  5 mg Oral Daily Viktoriya Pena MD      sodium chloride (PF)  3 mL Intravenous Q1H PRN Mi Smith MD          Today, Patient Was Seen By: Mitul Morgan MD    ** Please Note: Dictation voice to text software may have been used in the creation of this document   **

## 2022-01-14 NOTE — ASSESSMENT & PLAN NOTE
· Evolving since admission  · Patient with fever and tachycardia  · Appears weak, lethargic, + cough  · Send Blood cultures, check UA, CXR negative - chest CT is negative for any acute pathology  CT abdomen is rather unremarkable   · COVID/flu/RSV negative  · procalcitonin 0 33-procalcitonin today is 0 4  · 1/2 sets of blood culture came back positive for Gram-positive cocci in clinzbmk-pzlzwouuk-gtouwruh Staph likely contaminant  Will discontinue  · Follow-up on final culture    May need Infectious Disease evaluation if it is a true infection  · Continue with the antibiotics and recheck procalcitonin vancomycin and follow-up on the final culture  · Urine strep antigen is positive-discontinue doxycycline and continue with ceftriaxone-patient has previous pencil in allergy and tolerated ceftriaxone  · Patient with cough with expectoration-obtain sputum culture

## 2022-01-14 NOTE — ASSESSMENT & PLAN NOTE
· Suspect from sepsis  · Head CT is unremarkable for acute pathology  · Neuro checks  · Lethargy significantly better  · Patient had temp of 100 1° today with encephalopathy    Encephalopathy improved-likely related to fever

## 2022-01-14 NOTE — PLAN OF CARE
Problem: PHYSICAL THERAPY ADULT  Goal: Performs mobility at highest level of function for planned discharge setting  See evaluation for individualized goals  Description: Treatment/Interventions: Functional transfer training,LE strengthening/ROM,Therapeutic exercise,Elevations,Endurance training,Patient/family training,Equipment eval/education,Bed mobility,Gait training,Spoke to nursing,Spoke to case management,OT  Equipment Recommended: Martell Brumfield       See flowsheet documentation for full assessment, interventions and recommendations  Outcome: Progressing  Note: Prognosis: Fair  Problem List: Decreased strength,Decreased endurance,Impaired balance,Decreased mobility  Assessment: The pt  continues to require assistance for all aspects of mobility, but she was able to progress to assistance of one person  She requires extensive time in order to ambulate any distance, and she has a markedly forward-flexed posture that she is unable to correct for  She required going to the commode three separate times during the session, but she had improving mobility with each time  She also was able to progress her standing tolerance to two minutes over several trials  She ultimately wanted to return to the bed despite education about the benefit of sitting up in the chair during the day  She remains limited from her baseline, and inpatient rehabilitation is recommended in order to facilitate her functional progress  Barriers to Discharge: Inaccessible home environment,Decreased caregiver support        PT Discharge Recommendation: Post acute rehabilitation services          See flowsheet documentation for full assessment

## 2022-01-14 NOTE — ASSESSMENT & PLAN NOTE
· Noticed by PCP, was ordered OP echo  · IP echo-no significant valvular abnormality  · Cardiology on board

## 2022-01-14 NOTE — PLAN OF CARE
Problem: OCCUPATIONAL THERAPY ADULT  Goal: Performs self-care activities at highest level of function for planned discharge setting  See evaluation for individualized goals  Description: Treatment Interventions: ADL retraining,Functional transfer training,UE strengthening/ROM,Endurance training,Cognitive reorientation,Patient/family training,Equipment evaluation/education,Compensatory technique education,Energy conservation,Activityengagement  Equipment Recommended: Bedside commode       See flowsheet documentation for full assessment, interventions and recommendations  Outcome: Progressing  Note: Limitation: Decreased ADL status,Decreased UE strength,Decreased cognition,Decreased endurance,Decreased self-care trans,Decreased high-level ADLs,Decreased fine motor control  Prognosis: Good  Assessment: Pt seen on this date for OT session focusing on ADL retraining, body mechanics, transfer retraining, increasing activity tolerance/endurance and EOB sitting to increase ability to participate in ADL/functional tasks  Pt was found in bed and was left in chair w/ all needs within reach, chair alarm on  Pt completed bed mobility with MOD Ax1 this date, transfers with mod A x2, RW for support  Pt stating that she will perform further mobility when her dtr brings in her shoes  Pt very limited 2* fatigue, requires increased time to complete all tasks during session today  Sat in chair to complete UB and LB ADLs- completed UB bathing and dressing with Min A, LB bathing with MOD A, LB dressing with Max A  Pt then complete toileting tasks with total A while on beside commode, which she transferred to with mod Ax2 and RW for support  Pt w/ improvements in functional transfer ability and ADL task completion, however is still limited 2* decreased ADL/High-level ADL status, decreased activity tolerance/endurance, decreased cognition, decreased self-care trans, decreased safety awareness and insight to condition   The patient's raw score on the AM-PAC Daily Activity inpatient short form is 16, standardized score is 35 96, less than 39 4  Patients at this level are likely to benefit from discharge to post-acute rehabilitation services  Please refer to the recommendation of the Occupational Therapist for safe discharge planning  Recommending pt D/C to STR when medically stable  Pt will continue to benefit from acute OT services to meet goals  OT Discharge Recommendation: Post acute rehabilitation services  OT - OK to Discharge:  Yes

## 2022-01-14 NOTE — PROGRESS NOTES
[address] 1950, 66 y o  female MRN: [id number]  Unit/Bed#: [facility] 531-01 Encounter: [id number]  Primary Care Provider: Fransisco Suresh DO   Date and time admitted to hospital: 1/10/2022 10:11 AM    * Sepsis [facility])  Assessment & Plan  · Evolving since admission  · Patient with fever and tachycardia  · Appears weak, lethargic, + cough  · Send Blood cultures, check UA, CXR negative - chest CT is negative for any acute pathology  CT abdomen is rather unremarkable   · COVID/flu/RSV negative  · procalcitonin 0 33-procalcitonin today is 0 4  · 1/2 sets of blood culture came back positive for Gram-positive cocci in jsfiiyuz-vajynbtto-wlkcqfel Staph likely contaminant  Will discontinue  · Follow-up on final culture  May need Infectious Disease evaluation if it is a true infection  · Continue with the antibiotics and recheck procalcitonin vancomycin and follow-up on the final culture  · Urine strep antigen is positive-discontinue doxycycline and continue with ceftriaxone-patient has previous pencil in allergy and tolerated ceftriaxone  · Patient had a low-grade fever 100 1  Given her history of immunocompromise will get ID evaluation given persistent cough and low-grade fever  · Repeat chest x-ray was unremarkable as today for any acute infectious process    Lethargy  Assessment & Plan  · Suspect from sepsis  · Head CT is unremarkable for acute pathology  · Neuro checks  · Lethargy significantly better  · Patient had temp of 100 1° today with encephalopathy    Encephalopathy improved-likely related to fever    New onset a-fib [facility])  Assessment & Plan  · On presentation was in afib RVR with -120s  · After IV lopressor x1, converted to NSR  · Seen by cardio -> cont lopressor 25 BID, switched to PO eliquis  · Will need eliquis price check at the time of discharge  · Patient remained in sinus rhythm    Murmur, cardiac  Assessment & Plan  · Noticed by PCP, was ordered OP echo  · IP echo-no significant valvular abnormality  · Cardiology on board    Erythroderma  Assessment & Plan  · Patient noted to have diffuse rash on her skin  Skin is dry and peeling  · No definite diagnosis  Has been going on for the past 5 years started after being on IV antibiotics for a dental infection  · Family reported that the diagnosis of psoriasis was ruled out since the patient did not get better while on Humira for rheumatoid arthritis  · Dermatology evaluation appreciated  Erythroderma  Biopsy done -results pending  Outpatient follow-up with the dermatology  · Biopsy is back  Possible pustular dermatitis-no signs of infection    Benign essential hypertension  Assessment & Plan  · Hold amlodipine as starting metoprolol for rate control  · Blood pressure appears to be stable on metoprolol    Rheumatoid arthritis (HCC)  Assessment & Plan  · Continue plaquenil, prednisone 5 mg QD        VTE Pharmacologic Prophylaxis:   Pharmacologic: Apixaban (Eliquis)  Mechanical VTE Prophylaxis in Place: Yes    Patient Centered Rounds: I have performed bedside rounds with nursing staff today  Discussions with Specialists or Other Care Team Provider:  Infectious Disease    Education and Discussions with Family / Patient:  Daughter updated in the room    Time Spent for Care: 30 minutes  More than 50% of total time spent on counseling and coordination of care as described above  Current Length of Stay: 4 day(s)    Current Patient Status: Inpatient   Certification Statement:  Patient need continued inpatient stay secondary to fever persistent cough  Discharge Plan:     Code Status: Level 1 - Full Code      Subjective:   Patient seen and examined  Called by nursing since the patient was having a low-grade temp of 100° daughter noticed that the patient is more confused  When I went into the room patient thought she was in her house and she was in her bedroom    Other than that she was able to recognize her daughter and answers questions more or less appropriately  Temperature improved with administration of Tylenol  Since the patient is having persistent fever and cough will get ID evaluation patient is also immunosuppressed    Objective:     Vitals:   Temp (24hrs), Av 9 °F (37 7 °C), Min:99 1 °F (37 3 °C), Max:100 8 °F (38 2 °C)    Temp:  [99 1 °F (37 3 °C)-100 8 °F (38 2 °C)] 100 8 °F (38 2 °C)  HR:  [] 109  Resp:  [16] 16  BP: (102-135)/(53-64) 102/53  SpO2:  [95 %-97 %] 95 %  Body mass index is 44 16 kg/m²  Input and Output Summary (last 24 hours): Intake/Output Summary (Last 24 hours) at 2022  Last data filed at 2022  Gross per 24 hour   Intake 2023 ml   Output 1440 ml   Net 583 ml       Physical Exam:     Physical Exam  Constitutional:       General: She is not in acute distress  HENT:      Head: Normocephalic  Nose: Nose normal    Eyes:      General: No scleral icterus  Cardiovascular:      Rate and Rhythm: Normal rate and regular rhythm  Pulses: Normal pulses  Pulmonary:      Effort: Pulmonary effort is normal    Musculoskeletal:      Cervical back: Normal range of motion  Skin:     Comments: Skin rash more or less covering her entire body with skin peeling   Neurological:      General: No focal deficit present  Mental Status: She is alert  Additional Data:     Labs:    Results from last 7 days   Lab Units 22  0614 22  0634 22  0506   WBC Thousand/uL 6 39   < > 8 95   HEMOGLOBIN g/dL 11 8   < > 14 2   HEMATOCRIT % 38 0   < > 45 5   PLATELETS Thousands/uL 204   < > 194   NEUTROS PCT %  --   --  79*   LYMPHS PCT %  --   --  11*   MONOS PCT %  --   --  8   EOS PCT %  --   --  2    < > = values in this interval not displayed       Results from last 7 days   Lab Units 22  0614   POTASSIUM mmol/L 3 9   CHLORIDE mmol/L 106   CO2 mmol/L 26   BUN mg/dL 9   CREATININE mg/dL 0 87   CALCIUM mg/dL 8 9   ALK PHOS U/L 57   ALT U/L 31   AST U/L 31     Results from last 7 days   Lab Units 01/10/22  1043   INR  1 14       * I Have Reviewed All Lab Data Listed Above  * Additional Pertinent Lab Tests Reviewed: She 64 Admission Reviewed    Imaging:    Imaging Reports Reviewed Today Include:   Imaging Personally Reviewed by Myself Includes:      Recent Cultures (last 7 days):     Results from last 7 days   Lab Units 01/12/22  0634 01/10/22  2025 01/10/22  1849   BLOOD CULTURE  No Growth at 48 hrs  No Growth at 48 hrs  Staphylococcus coagulase negative* No Growth at 72 hrs     GRAM STAIN RESULT   --  Gram positive cocci in clusters*  --        Last 24 Hours Medication List:   Current Facility-Administered Medications   Medication Dose Route Frequency Provider Last Rate    acetaminophen  650 mg Oral Q4H PRN Jude Briones MD      albuterol  2 puff Inhalation Q4H PRN Jina Minaya MD      ammonium lactate   Topical BID PRN Jina Minaya MD      apixaban  5 mg Oral BID Yuki Olmedo DO      benzonatate  100 mg Oral TID PRN Jina Minaya MD      cefTRIAXone  2,000 mg Intravenous Q24H Cuco Ordoñez MD      cholecalciferol  1,000 Units Oral Daily Jude Briones MD      ciprofloxacin  1 drop Both Eyes Q4H While Awake Jina Minaya MD      clobetasol   Topical BID Jude Briones MD      clotrimazole   Topical BID Jude Briones MD      diphenhydrAMINE  25 mg Oral Q6H PRN Jina Minaya MD      guaiFENesin  600 mg Oral Q12H [facility] Jina Minaya MD      hydrocodone-chlorpheniramine polistirex  5 mL Oral Q12H PRN Jina Minaya MD      hydroxychloroquine  200 mg Oral BID Jude Briones MD      metoprolol  5 mg Intravenous Q6H PRN Jude Briones MD      metoprolol tartrate  25 mg Oral Q12H [facility] Yuki Olmedo [place]      MAGALY ANTIFUNGAL   Topical BID Jina Minaya MD      pantoprazole  40 mg Oral Early Morning Jude Briones MD      predniSONE  5 mg Oral Daily Marzena JOHN MD Andrea      sodium chloride (PF)  3 mL Intravenous Q1H PRN Alan Moreira MD          Today, Patient Was Seen By: Virginia Medina MD    ** Please Note: Dictation voice to text software may have been used in the creation of this document   **

## 2022-01-14 NOTE — ASSESSMENT & PLAN NOTE
· Evolving since admission  · Patient with fever and tachycardia  · Appears weak, lethargic, + cough  · Send Blood cultures, check UA, CXR negative - chest CT is negative for any acute pathology  CT abdomen is rather unremarkable   · COVID/flu/RSV negative  · procalcitonin 0 33-procalcitonin today is 0 4  · 1/2 sets of blood culture came back positive for Gram-positive cocci in agyzlbge-yejgpvqel-vjlpfcaf Staph likely contaminant  Will discontinue  · Follow-up on final culture  May need Infectious Disease evaluation if it is a true infection  · Continue with the antibiotics and recheck procalcitonin vancomycin and follow-up on the final culture  · Urine strep antigen is positive-discontinue doxycycline and continue with ceftriaxone-patient has previous pencil in allergy and tolerated ceftriaxone  · Patient had a low-grade fever 100 1    Given her history of immunocompromise will get ID evaluation given persistent cough and low-grade fever  · Repeat chest x-ray was unremarkable as today for any acute infectious process

## 2022-01-14 NOTE — OCCUPATIONAL THERAPY NOTE
Occupational Therapy Progress Note     Patient Name: Kelsey TURKH Date: 1/14/2022  Problem List  Principal Problem:    Sepsis ([facility] [address] )  Active Problems:    [facility]    GERD (gastroesophageal reflux disease)    Benign essential hypertension    Skin rash    Murmur, cardiac    New onset a-fib ([facility] )    Lethargy    Vomiting        01/14/22 0910   OT Last Visit   OT Visit Date 01/14/22   Note Type   Note Type Treatment   Restrictions/Precautions   Weight Bearing Precautions Per Order No   Other Precautions Chair Alarm; Bed Alarm;Multiple lines;Telemetry; Fall Risk   General   Response to Previous Treatment Patient with no complaints from previous session   Lifestyle   Autonomy At baseline pt was completing ADLs IND, IADLs with assist, ambulating MOD IND with cane, (-) driving (van transportation)  Reciprocal Relationships supportive family   Service to Others retired   [address] enjoys watching TV   Pain Assessment   Pain Assessment Tool 0-10   Pain Score No Pain   ADL   Where Assessed Chair   UB Bathing Assistance 4  Minimal Assistance   UB Bathing Deficit Setup; Increased time to complete; Chest;Right arm;Left arm; Abdomen;Supervision/safety   UB Bathing Comments pt completed UB bathing with min A this date, required A for washing back  Otherwise, pt able to wash b/l UE's and abdomen/chest without assistance  LB Bathing Assistance 2  Maximal Assistance   LB Bathing Deficit Setup; Increased time to complete; Buttocks; Perineal area; Left upper leg;Right lower leg including foot; Left lower leg including foot;Right upper leg   LB Bathing Comments max A for LB bathing- is able to wash upper LE's, ultimately requires A for washing lower LE's and feet  UB Dressing Assistance 4  Minimal Assistance   UB Dressing Deficit Setup; Increased time to complete; Thread LUE; Thread RUE;Pull around back   UB Dressing Comments Min A for UB dressing- pt rq A for threading UE's, however once threaded she is able to manipulate gown over shoulders  LB Dressing Assistance 1  Total Assistance   LB Dressing Deficit Setup; Increased time to complete; Don/doff R sock; Don/doff L sock   LB Dressing Comments total A for doffing/donning socks  Toileting Assistance  1  Total Assistance   Toileting Deficit Setup; Increased time to complete; Bedside commode;Perineal hygiene   Toileting Comments total A for toileting on bedside commode  Pt rq total A for perineal hygiene as well  Bed Mobility   Supine to Sit 3  Moderate assistance   Additional items Assist x 1; Increased time required;Verbal cues;LE management   Additional Comments pt found supine in bed, left on commode w all needs in reach, RN and PCA aware  Transfers   Sit to Stand 3  Moderate assistance   Additional items Assist x 2; Increased time required;Verbal cues   Stand to Sit 3  Moderate assistance   Additional items Assist x 2; Increased time required;Verbal cues   Additional Comments c RW, VC for hand placement  Pt rq Mod A x2 for small steps from EOB >chair>bedside commode  Pt reports concerns with wearing hospital socks, states she will perform further mobility when her dtr brings her shoes  Functional Mobility   Functional Mobility 3  Moderate assistance   Additional Comments ax2, short steps EOB>chair>commode  Additional items Rolling walker   Toilet Transfers   Toilet Transfer From Rolling walker   Toilet Transfer Type To and from   Toilet Transfer to Standard bedside commode   Toilet Transfer Technique Ambulating   Toilet Transfers Moderate assistance   Toilet Transfers Comments mod Ax2 for toilet transfers this date to bedside commode  Cognition   Overall Cognitive Status WFL   Arousal/Participation Alert; Cooperative   Attention Within functional limits   Orientation Level Oriented X4   Memory Decreased recall of recent events   Following Commands Follows one step commands with increased time or repetition   Comments pt cooperative, presents with overall fair safety awareness and insight to condition  Activity Tolerance   Activity Tolerance Patient limited by fatigue   Medical Staff Made Aware ALEXANDREA Stoddard RN [facility] aware   Assessment   Assessment Pt seen on this date for OT session focusing on ADL retraining, body mechanics, transfer retraining, increasing activity tolerance/endurance and EOB sitting to increase ability to participate in ADL/functional tasks  Pt was found in bed and was left in chair w/ all needs within reach, chair alarm on  Pt completed bed mobility with MOD Ax1 this date, transfers with mod A x2, RW for support  Pt stating that she will perform further mobility when her dtr brings in her shoes  Pt very limited 2* fatigue, requires increased time to complete all tasks during session today  Sat in chair to complete UB and LB ADLs- completed UB bathing and dressing with Min A, LB bathing with MOD A, LB dressing with Max A  Pt then complete toileting tasks with total A while on beside commode, which she transferred to with mod Ax2 and RW for support  Pt w/ improvements in functional transfer ability and ADL task completion, however is still limited 2* decreased ADL/High-level ADL status, decreased activity tolerance/endurance, decreased cognition, decreased self-care trans, decreased safety awareness and insight to condition  The patient's raw score on the AM-PAC Daily Activity inpatient short form is 16, standardized score is 35 96, less than 39 4  Patients at this level are likely to benefit from discharge to post-acute rehabilitation services  Please refer to the recommendation of the Occupational Therapist for safe discharge planning  Recommending pt D/C to STR when medically stable  Pt will continue to benefit from acute OT services to meet goals  Plan   Treatment Interventions ADL retraining;Functional transfer training; Endurance training;Patient/family training; Compensatory technique education; Energy conservation; Activityengagement Goal Expiration Date 01/25/22   OT Treatment Day 1   OT Frequency 3-5x/wk   Recommendation   OT Discharge Recommendation Post acute rehabilitation services   OT - OK to Discharge Yes   AM-PAC Daily Activity Inpatient   Lower Body Dressing 2   Bathing 2   Toileting 2   Upper Body Dressing 3   Grooming 3   Eating 4   Daily Activity Raw Score 16   Daily Activity Standardized Score (Calc for Raw Score >=11) 35 96   AM-PAC Applied Cognition Inpatient   Following a Speech/Presentation 3   Understanding Ordinary Conversation 4   Taking Medications 3   Remembering Where Things Are Placed or Put Away 3   Remembering List of 4-5 Errands 3   Taking Care of Complicated Tasks 3   Applied Cognition Raw Score 19   Applied Cognition Standardized Score 39 77         Belen Cola, MOT, OTR/L

## 2022-01-14 NOTE — ASSESSMENT & PLAN NOTE
· Hold amlodipine as starting metoprolol for rate control  · Likely restart back on the amlodipine soon

## 2022-01-14 NOTE — ASSESSMENT & PLAN NOTE
· Patient noted to have diffuse rash on her skin  Skin is dry and peeling  · No definite diagnosis  Has been going on for the past 5 years started after being on IV antibiotics for a dental infection  · Family reported that the diagnosis of psoriasis was ruled out since the patient did not get better while on Humira for rheumatoid arthritis  · Dermatology evaluation appreciated  Erythroderma  Biopsy done -results pending  Outpatient follow-up with the dermatology  · Biopsy is back    Possible pustular dermatitis-no signs of infection

## 2022-01-14 NOTE — ASSESSMENT & PLAN NOTE
· Hold amlodipine as starting metoprolol for rate control  · Blood pressure appears to be stable on metoprolol

## 2022-01-15 LAB
ALBUMIN SERPL BCP-MCNC: 2.1 G/DL (ref 3.5–5)
ALP SERPL-CCNC: 50 U/L (ref 46–116)
ALT SERPL W P-5'-P-CCNC: 27 U/L (ref 12–78)
ANION GAP SERPL CALCULATED.3IONS-SCNC: 8 MMOL/L (ref 4–13)
AST SERPL W P-5'-P-CCNC: 28 U/L (ref 5–45)
BACTERIA BLD CULT: NORMAL
BILIRUB SERPL-MCNC: 0.39 MG/DL (ref 0.2–1)
BUN SERPL-MCNC: 9 MG/DL (ref 5–25)
CALCIUM ALBUM COR SERPL-MCNC: 10.4 MG/DL (ref 8.3–10.1)
CALCIUM SERPL-MCNC: 8.9 MG/DL (ref 8.3–10.1)
CHLORIDE SERPL-SCNC: 108 MMOL/L (ref 100–108)
CO2 SERPL-SCNC: 24 MMOL/L (ref 21–32)
CREAT SERPL-MCNC: 0.93 MG/DL (ref 0.6–1.3)
ERYTHROCYTE [DISTWIDTH] IN BLOOD BY AUTOMATED COUNT: 14.6 % (ref 11.6–15.1)
GFR SERPL CREATININE-BSD FRML MDRD: 61 ML/MIN/1.73SQ M
GLUCOSE SERPL-MCNC: 75 MG/DL (ref 65–140)
HCT VFR BLD AUTO: 37.3 % (ref 34.8–46.1)
HGB BLD-MCNC: 11.6 G/DL (ref 11.5–15.4)
MCH RBC QN AUTO: 27.4 PG (ref 26.8–34.3)
MCHC RBC AUTO-ENTMCNC: 31.1 G/DL (ref 31.4–37.4)
MCV RBC AUTO: 88 FL (ref 82–98)
PLATELET # BLD AUTO: 205 THOUSANDS/UL (ref 149–390)
PMV BLD AUTO: 11.2 FL (ref 8.9–12.7)
POTASSIUM SERPL-SCNC: 3.7 MMOL/L (ref 3.5–5.3)
PROCALCITONIN SERPL-MCNC: 0.23 NG/ML
PROT SERPL-MCNC: 6.3 G/DL (ref 6.4–8.2)
RBC # BLD AUTO: 4.23 MILLION/UL (ref 3.81–5.12)
SODIUM SERPL-SCNC: 140 MMOL/L (ref 136–145)
WBC # BLD AUTO: 6.83 THOUSAND/UL (ref 4.31–10.16)

## 2022-01-15 PROCEDURE — 84145 PROCALCITONIN (PCT): CPT | Performed by: FAMILY MEDICINE

## 2022-01-15 PROCEDURE — 85027 COMPLETE CBC AUTOMATED: CPT | Performed by: FAMILY MEDICINE

## 2022-01-15 PROCEDURE — 99232 SBSQ HOSP IP/OBS MODERATE 35: CPT | Performed by: FAMILY MEDICINE

## 2022-01-15 PROCEDURE — 80053 COMPREHEN METABOLIC PANEL: CPT | Performed by: FAMILY MEDICINE

## 2022-01-15 PROCEDURE — 86790 VIRUS ANTIBODY NOS: CPT | Performed by: STUDENT IN AN ORGANIZED HEALTH CARE EDUCATION/TRAINING PROGRAM

## 2022-01-15 RX ADMIN — APIXABAN 5 MG: 5 TABLET, FILM COATED ORAL at 09:10

## 2022-01-15 RX ADMIN — CIPROFLOXACIN HYDROCHLORIDE 1 DROP: 3 SOLUTION/ DROPS OPHTHALMIC at 13:24

## 2022-01-15 RX ADMIN — HYDROXYCHLOROQUINE SULFATE 200 MG: 200 TABLET, FILM COATED ORAL at 17:52

## 2022-01-15 RX ADMIN — CLOBETASOL PROPIONATE: 0.5 CREAM TOPICAL at 17:53

## 2022-01-15 RX ADMIN — METOPROLOL TARTRATE 25 MG: 25 TABLET, FILM COATED ORAL at 20:54

## 2022-01-15 RX ADMIN — CIPROFLOXACIN HYDROCHLORIDE 1 DROP: 3 SOLUTION/ DROPS OPHTHALMIC at 22:28

## 2022-01-15 RX ADMIN — APIXABAN 5 MG: 5 TABLET, FILM COATED ORAL at 17:53

## 2022-01-15 RX ADMIN — MICONAZOLE NITRATE: 20 CREAM TOPICAL at 09:20

## 2022-01-15 RX ADMIN — Medication 1000 UNITS: at 09:10

## 2022-01-15 RX ADMIN — GUAIFENESIN 600 MG: 600 TABLET, EXTENDED RELEASE ORAL at 20:54

## 2022-01-15 RX ADMIN — CLOTRIMAZOLE: 1 CREAM TOPICAL at 09:19

## 2022-01-15 RX ADMIN — CIPROFLOXACIN HYDROCHLORIDE 1 DROP: 3 SOLUTION/ DROPS OPHTHALMIC at 05:05

## 2022-01-15 RX ADMIN — GUAIFENESIN 600 MG: 600 TABLET, EXTENDED RELEASE ORAL at 09:10

## 2022-01-15 RX ADMIN — CIPROFLOXACIN HYDROCHLORIDE 1 DROP: 3 SOLUTION/ DROPS OPHTHALMIC at 17:53

## 2022-01-15 RX ADMIN — PANTOPRAZOLE SODIUM 40 MG: 40 TABLET, DELAYED RELEASE ORAL at 05:05

## 2022-01-15 RX ADMIN — CLOTRIMAZOLE: 1 CREAM TOPICAL at 17:53

## 2022-01-15 RX ADMIN — CIPROFLOXACIN HYDROCHLORIDE 1 DROP: 3 SOLUTION/ DROPS OPHTHALMIC at 09:20

## 2022-01-15 RX ADMIN — METOPROLOL TARTRATE 25 MG: 25 TABLET, FILM COATED ORAL at 09:10

## 2022-01-15 RX ADMIN — CEFTRIAXONE SODIUM 2000 MG: 10 INJECTION, POWDER, FOR SOLUTION INTRAVENOUS at 20:54

## 2022-01-15 RX ADMIN — PREDNISONE 5 MG: 5 TABLET ORAL at 09:10

## 2022-01-15 RX ADMIN — MICONAZOLE NITRATE: 20 CREAM TOPICAL at 17:53

## 2022-01-15 RX ADMIN — CLOBETASOL PROPIONATE: 0.5 CREAM TOPICAL at 09:19

## 2022-01-15 RX ADMIN — HYDROXYCHLOROQUINE SULFATE 200 MG: 200 TABLET, FILM COATED ORAL at 09:11

## 2022-01-15 NOTE — PLAN OF CARE
Problem: MOBILITY - ADULT  Goal: Maintain or return to baseline ADL function  Description: INTERVENTIONS:  -  Assess patient's ability to carry out ADLs; assess patient's baseline for ADL function and identify physical deficits which impact ability to perform ADLs (bathing, care of mouth/teeth, toileting, grooming, dressing, etc )  - Assess/evaluate cause of self-care deficits   - Assess range of motion  - Assess patient's mobility; develop plan if impaired  - Assess patient's need for assistive devices and provide as appropriate  - Encourage maximum independence but intervene and supervise when necessary  - Involve family in performance of ADLs  - Assess for home care needs following discharge   - Consider OT consult to assist with ADL evaluation and planning for discharge  - Provide patient education as appropriate  Outcome: Progressing  Goal: Maintains/Returns to pre admission functional level  Description: INTERVENTIONS:  - Perform BMAT or MOVE assessment daily    - Set and communicate daily mobility goal to care team and patient/family/caregiver  - Collaborate with rehabilitation services on mobility goals if consulted  - Perform Range of Motion  times a day  - Reposition patient every  hours    - Dangle patient  times a day  - Stand patient  times a day  - Ambulate patient  times a day  - Out of bed to chair  times a day   - Out of bed for meals  times a day  - Out of bed for toileting  - Record patient progress and toleration of activity level   Outcome: Progressing     Problem: Prexisting or High Potential for Compromised Skin Integrity  Goal: Skin integrity is maintained or improved  Description: INTERVENTIONS:  - Identify patients at risk for skin breakdown  - Assess and monitor skin integrity  - Assess and monitor nutrition and hydration status  - Monitor labs   - Assess for incontinence   - Turn and reposition patient  - Assist with mobility/ambulation  - Relieve pressure over bony prominences  - Avoid friction and shearing  - Provide appropriate hygiene as needed including keeping skin clean and dry  - Evaluate need for skin moisturizer/barrier cream  - Collaborate with interdisciplinary team   - Patient/family teaching  - Consider wound care consult   Outcome: Progressing     Problem: Potential for Falls  Goal: Patient will remain free of falls  Description: INTERVENTIONS:  - Educate patient/family on patient safety including physical limitations  - Instruct patient to call for assistance with activity   - Consult OT/PT to assist with strengthening/mobility   - Keep Call bell within reach  - Keep bed low and locked with side rails adjusted as appropriate  - Keep care items and personal belongings within reach  - Initiate and maintain comfort rounds  - Make Fall Risk Sign visible to staff  - Offer Toileting every  Hours, in advance of need  - Initiate/Maintain alarm  - Obtain necessary fall risk management equipment:   - Apply yellow socks and bracelet for high fall risk patients  - Consider moving patient to room near nurses station  Outcome: Progressing     Problem: Nutrition/Hydration-ADULT  Goal: Nutrient/Hydration intake appropriate for improving, restoring or maintaining nutritional needs  Description: Monitor and assess patient's nutrition/hydration status for malnutrition  Collaborate with interdisciplinary team and initiate plan and interventions as ordered  Monitor patient's weight and dietary intake as ordered or per policy  Utilize nutrition screening tool and intervene as necessary  Determine patient's food preferences and provide high-protein, high-caloric foods as appropriate       INTERVENTIONS:  - Monitor oral intake, urinary output, labs, and treatment plans  - Assess nutrition and hydration status and recommend course of action  - Evaluate amount of meals eaten  - Assist patient with eating if necessary   - Allow adequate time for meals  - Recommend/ encourage appropriate diets, oral nutritional supplements, and vitamin/mineral supplements  - Order, calculate, and assess calorie counts as needed  - Recommend, monitor, and adjust tube feedings and TPN/PPN based on assessed needs  - Assess need for intravenous fluids  - Provide specific nutrition/hydration education as appropriate  - Include patient/family/caregiver in decisions related to nutrition  Outcome: Progressing     Problem: PAIN - ADULT  Goal: Verbalizes/displays adequate comfort level or baseline comfort level  Description: Interventions:  - Encourage patient to monitor pain and request assistance  - Assess pain using appropriate pain scale  - Administer analgesics based on type and severity of pain and evaluate response  - Implement non-pharmacological measures as appropriate and evaluate response  - Consider cultural and social influences on pain and pain management  - Notify physician/advanced practitioner if interventions unsuccessful or patient reports new pain  Outcome: Progressing     Problem: INFECTION - ADULT  Goal: Absence or prevention of progression during hospitalization  Description: INTERVENTIONS:  - Assess and monitor for signs and symptoms of infection  - Monitor lab/diagnostic results  - Monitor all insertion sites, i e  indwelling lines, tubes, and drains  - Monitor endotracheal if appropriate and nasal secretions for changes in amount and color  - [place] appropriate cooling/warming therapies per order  - Administer medications as ordered  - Instruct and encourage patient and family to use good hand hygiene technique  - Identify and instruct in appropriate isolation precautions for identified infection/condition  Outcome: Progressing     Problem: SAFETY ADULT  Goal: Maintain or return to baseline ADL function  Description: INTERVENTIONS:  -  Assess patient's ability to carry out ADLs; assess patient's baseline for ADL function and identify physical deficits which impact ability to perform ADLs (bathing, care of mouth/teeth, toileting, grooming, dressing, etc )  - Assess/evaluate cause of self-care deficits   - Assess range of motion  - Assess patient's mobility; develop plan if impaired  - Assess patient's need for assistive devices and provide as appropriate  - Encourage maximum independence but intervene and supervise when necessary  - Involve family in performance of ADLs  - Assess for home care needs following discharge   - Consider OT consult to assist with ADL evaluation and planning for discharge  - Provide patient education as appropriate  Outcome: Progressing  Goal: Maintains/Returns to pre admission functional level  Description: INTERVENTIONS:  - Perform BMAT or MOVE assessment daily    - Set and communicate daily mobility goal to care team and patient/family/caregiver  - Collaborate with rehabilitation services on mobility goals if consulted  - Perform Range of Motion  times a day  - Reposition patient every  hours    - Dangle patient  times a day  - Stand patient  times a day  - Ambulate patient  times a day  - Out of bed to chair  times a day   - Out of bed for meals  times a day  - Out of bed for toileting  - Record patient progress and toleration of activity level   Outcome: Progressing  Goal: Patient will remain free of falls  Description: INTERVENTIONS:  - Educate patient/family on patient safety including physical limitations  - Instruct patient to call for assistance with activity   - Consult OT/PT to assist with strengthening/mobility   - Keep Call bell within reach  - Keep bed low and locked with side rails adjusted as appropriate  - Keep care items and personal belongings within reach  - Initiate and maintain comfort rounds  - Make Fall Risk Sign visible to staff  - Offer Toileting every  Hours, in advance of need  - Initiate/Maintain alarm  - Obtain necessary fall risk management equipment:  - Apply yellow socks and bracelet for high fall risk patients  - Consider moving patient to room near nurses station  Outcome: Progressing     Problem: DISCHARGE PLANNING  Goal: Discharge to home or other facility with appropriate resources  Description: INTERVENTIONS:  - Identify barriers to discharge w/patient and caregiver  - Arrange for needed discharge resources and transportation as appropriate  - Identify discharge learning needs (meds, wound care, etc )  - Arrange for interpretive services to assist at discharge as needed  - Refer to Case Management Department for coordinating discharge planning if the patient needs post-hospital services based on physician/advanced practitioner order or complex needs related to functional status, cognitive ability, or social support system  Outcome: Progressing     Problem: Knowledge Deficit  Goal: Patient/family/caregiver demonstrates understanding of disease process, treatment plan, medications, and discharge instructions  Description: Complete learning assessment and assess knowledge base    Interventions:  - Provide teaching at level of understanding  - Provide teaching via preferred learning methods  Outcome: Progressing

## 2022-01-16 PROBLEM — R11.10 VOMITING: Status: RESOLVED | Noted: 2022-01-11 | Resolved: 2022-01-16

## 2022-01-16 PROBLEM — R53.83 LETHARGY: Status: RESOLVED | Noted: 2022-01-10 | Resolved: 2022-01-16

## 2022-01-16 PROCEDURE — 88300 SURGICAL PATH GROSS: CPT | Performed by: STUDENT IN AN ORGANIZED HEALTH CARE EDUCATION/TRAINING PROGRAM

## 2022-01-16 PROCEDURE — 99232 SBSQ HOSP IP/OBS MODERATE 35: CPT | Performed by: FAMILY MEDICINE

## 2022-01-16 PROCEDURE — 88346 IMFLUOR 1ST 1ANTB STAIN PX: CPT | Performed by: STUDENT IN AN ORGANIZED HEALTH CARE EDUCATION/TRAINING PROGRAM

## 2022-01-16 PROCEDURE — 88350 IMFLUOR EA ADDL 1ANTB STN PX: CPT

## 2022-01-16 PROCEDURE — 97530 THERAPEUTIC ACTIVITIES: CPT

## 2022-01-16 PROCEDURE — 97535 SELF CARE MNGMENT TRAINING: CPT

## 2022-01-16 PROCEDURE — 11102 TANGNTL BX SKIN SINGLE LES: CPT | Performed by: STUDENT IN AN ORGANIZED HEALTH CARE EDUCATION/TRAINING PROGRAM

## 2022-01-16 PROCEDURE — 0HBBXZX EXCISION OF RIGHT UPPER ARM SKIN, EXTERNAL APPROACH, DIAGNOSTIC: ICD-10-PCS | Performed by: STUDENT IN AN ORGANIZED HEALTH CARE EDUCATION/TRAINING PROGRAM

## 2022-01-16 PROCEDURE — 99213 OFFICE O/P EST LOW 20 MIN: CPT | Performed by: STUDENT IN AN ORGANIZED HEALTH CARE EDUCATION/TRAINING PROGRAM

## 2022-01-16 PROCEDURE — 97116 GAIT TRAINING THERAPY: CPT

## 2022-01-16 RX ADMIN — CLOTRIMAZOLE: 1 CREAM TOPICAL at 17:28

## 2022-01-16 RX ADMIN — METOPROLOL TARTRATE 25 MG: 25 TABLET, FILM COATED ORAL at 09:13

## 2022-01-16 RX ADMIN — CLOBETASOL PROPIONATE: 0.5 CREAM TOPICAL at 17:28

## 2022-01-16 RX ADMIN — BENZONATATE 100 MG: 100 CAPSULE ORAL at 20:56

## 2022-01-16 RX ADMIN — MICONAZOLE NITRATE: 20 CREAM TOPICAL at 17:27

## 2022-01-16 RX ADMIN — Medication: at 17:27

## 2022-01-16 RX ADMIN — GUAIFENESIN 600 MG: 600 TABLET, EXTENDED RELEASE ORAL at 09:13

## 2022-01-16 RX ADMIN — HYDROXYCHLOROQUINE SULFATE 200 MG: 200 TABLET, FILM COATED ORAL at 09:14

## 2022-01-16 RX ADMIN — APIXABAN 5 MG: 5 TABLET, FILM COATED ORAL at 17:27

## 2022-01-16 RX ADMIN — GUAIFENESIN 600 MG: 600 TABLET, EXTENDED RELEASE ORAL at 20:56

## 2022-01-16 RX ADMIN — Medication: at 09:14

## 2022-01-16 RX ADMIN — HYDROXYCHLOROQUINE SULFATE 200 MG: 200 TABLET, FILM COATED ORAL at 17:27

## 2022-01-16 RX ADMIN — PREDNISONE 5 MG: 5 TABLET ORAL at 09:13

## 2022-01-16 RX ADMIN — CLOBETASOL PROPIONATE: 0.5 CREAM TOPICAL at 09:14

## 2022-01-16 RX ADMIN — CLOTRIMAZOLE: 1 CREAM TOPICAL at 09:14

## 2022-01-16 RX ADMIN — Medication 1000 UNITS: at 09:13

## 2022-01-16 RX ADMIN — CIPROFLOXACIN HYDROCHLORIDE 1 DROP: 3 SOLUTION/ DROPS OPHTHALMIC at 09:16

## 2022-01-16 RX ADMIN — PANTOPRAZOLE SODIUM 40 MG: 40 TABLET, DELAYED RELEASE ORAL at 06:44

## 2022-01-16 RX ADMIN — METOPROLOL TARTRATE 25 MG: 25 TABLET, FILM COATED ORAL at 21:02

## 2022-01-16 RX ADMIN — MICONAZOLE NITRATE: 20 CREAM TOPICAL at 09:27

## 2022-01-16 RX ADMIN — CEFTRIAXONE SODIUM 2000 MG: 10 INJECTION, POWDER, FOR SOLUTION INTRAVENOUS at 20:53

## 2022-01-16 RX ADMIN — APIXABAN 5 MG: 5 TABLET, FILM COATED ORAL at 09:13

## 2022-01-16 RX ADMIN — CIPROFLOXACIN HYDROCHLORIDE 1 DROP: 3 SOLUTION/ DROPS OPHTHALMIC at 06:44

## 2022-01-16 NOTE — PLAN OF CARE
Problem: OCCUPATIONAL THERAPY ADULT  Goal: Performs self-care activities at highest level of function for planned discharge setting  See evaluation for individualized goals  Description: Treatment Interventions: ADL retraining,Functional transfer training,UE strengthening/ROM,Endurance training,Cognitive reorientation,Patient/family training,Equipment evaluation/education,Compensatory technique education,Energy conservation,Activityengagement  Equipment Recommended: Bedside commode       See flowsheet documentation for full assessment, interventions and recommendations  Outcome: Progressing  Note: Limitation: Decreased ADL status,Decreased UE strength,Decreased cognition,Decreased endurance,Decreased self-care trans,Decreased high-level ADLs,Decreased fine motor control  Prognosis: Good  Assessment: Upon arrival of OT, pt was supine in bed  Pt participated in skilled OT session this date with interventions consisting of ADL re training with the use of correct body mechnaics, safety awareness and fall prevention techniques and  functional transfer training*   In comparison to previous session, pt demonstrates improvements in activity tolerance  Pt continues to be functioning below baseline level  Pt demonstrated the following tasks: modA X2 supine to sit, modA X2 sit to stand, modA X2 for functional ambulation using a RW  Pt completed the following ADLs: grooming and UB dressing supine in bed w Lexus, LB dressing sitting EOB w modA, and required maxA for toileting using bedside commode this date  Occupational performance remains limited secondary to factors listed above and increased risk for falls and injury  From OT standpoint, recommendation at time of d/c would be Short Term Rehab  Patient to benefit from continued Occupational Therapy treatment while in the hospital to address deficits as defined above and maximize level of functional independence with ADLs and functional mobility   Upon completion of OT session pt was left OOB in chair w all current needs met and call bell within reach  The patient's raw score on the AM-PAC Daily Activity inpatient short form is 16, standardized score is 35 96, less than 39 4  Patients at this level are likely to benefit from discharge to post-acute rehabilitation services  Please refer to the recommendation of the Occupational Therapist for safe discharge planning       OT Discharge Recommendation: Post acute rehabilitation services  OT - OK to Discharge: Yes (When medically appropriate )

## 2022-01-16 NOTE — PROGRESS NOTES
[address] 1950, 66 y o  female MRN: [id number]  Unit/Bed#: [facility] 531-01 Encounter: [id number]  Primary Care Provider: Christine Santamaria DO   Date and time admitted to hospital: 1/10/2022 10:11 AM    * Sepsis [facility])  Assessment & Plan  · Evolving since admission  · Patient with fever and tachycardia  · Appears weak, lethargic, + cough  · Send Blood cultures, check UA, CXR negative - chest CT is negative for any acute pathology  CT abdomen is rather unremarkable   · COVID/flu/RSV negative  · procalcitonin 0 33-procalcitonin today is 0 4  · 1/2 sets of blood culture came back positive for Gram-positive cocci in hiztnesi-pgetewjmy-djalkxyc Staph likely contaminant  Will discontinue  · Follow-up on final culture  May need Infectious Disease evaluation if it is a true infection  · Continue with the antibiotics and recheck procalcitonin vancomycin and follow-up on the final culture  · Urine strep antigen is positive-discontinue doxycycline and continue with ceftriaxone-patient has previous pencil in allergy and tolerated ceftriaxone  · Patient had a low-grade fever 100 1  Given her history of immunocompromise will get ID evaluation given persistent cough and low-grade fever  · Repeat chest x-ray was unremarkable as today for any acute infectious process    Vomiting  Assessment & Plan  · Appears to be resolved  Patient tolerating diet      Lethargy  Assessment & Plan  · Suspect from sepsis  · Head CT is unremarkable for acute pathology  · Neuro checks  · Lethargy significantly better  · Patient had temp of 100 1° today with encephalopathy    Encephalopathy improved-likely related to fever    New onset a-fib [facility])  Assessment & Plan  · On presentation was in afib RVR with -120s  · After IV lopressor x1, converted to NSR  · Seen by cardio -> cont lopressor 25 BID, switched to PO eliquis  · Will need eliquis price check at the time of discharge  · Patient remained in sinus rhythm    Murmur, cardiac  Assessment & Plan  · Noticed by PCP, was ordered OP echo  · IP echo-no significant valvular abnormality  · Cardiology on board    Erythroderma  Assessment & Plan  · Patient noted to have diffuse rash on her skin  Skin is dry and peeling  · No definite diagnosis  Has been going on for the past 5 years started after being on IV antibiotics for a dental infection  · Family reported that the diagnosis of psoriasis was ruled out since the patient did not get better while on Humira for rheumatoid arthritis  · Dermatology evaluation appreciated  Erythroderma  Biopsy done -results pending  Outpatient follow-up with the dermatology  · Biopsy is back  Possible pustular dermatitis-    Benign essential hypertension  Assessment & Plan  · Hold amlodipine as starting metoprolol for rate control  · Blood pressure appears to be stable on metoprolol    Rheumatoid arthritis (HCC)  Assessment & Plan  · Continue plaquenil, prednisone 5 mg QD      VTE Pharmacologic Prophylaxis:   Pharmacologic: Apixaban (Eliquis)  Mechanical VTE Prophylaxis in Place: Yes    Patient Centered Rounds: I have performed bedside rounds with nursing staff today  Discussions with Specialists or Other Care Team Provider:       Education and Discussions with Family / Piotr Perez    Time Spent for Care: 30 minutes  More than 50% of total time spent on counseling and coordination of care as described above  Current Length of Stay: 5 day(s)    Current Patient Status: Inpatient   Certification Statement: The patient will continue to require additional inpatient hospital stay due to Pending rehab    Discharge Plan:     Code Status: Level 1 - Full Code      Subjective:   Patient seen and examined    No events overnight    Objective:     Vitals:   Temp (24hrs), Av 4 °F (36 9 °C), Min:98 2 °F (36 8 °C), Max:98 6 °F (37 °C)    Temp:  [98 2 °F (36 8 °C)-98 6 °F (37 °C)] 98 6 °F (37 °C)  HR:  [86-99] 86  Resp:  [16-20] 20  BP: (105-111)/(50-54) 111/53  SpO2:  [95 %-96 %] 96 %  Body mass index is 43 56 kg/m²  Input and Output Summary (last 24 hours): Intake/Output Summary (Last 24 hours) at 1/15/2022 2029  Last data filed at 1/15/2022 1100  Gross per 24 hour   Intake 356 ml   Output 500 ml   Net -144 ml       Physical Exam:     Physical Exam  Constitutional:       General: She is not in acute distress  HENT:      Head: Normocephalic  Nose: Nose normal    Eyes:      General: No scleral icterus  Cardiovascular:      Rate and Rhythm: Normal rate and regular rhythm  Pulses: Normal pulses  Pulmonary:      Effort: Pulmonary effort is normal       Comments: Decreased breath sounds bilateral  Abdominal:      General: Abdomen is flat  There is no distension  Skin:     Findings: Rash present  Neurological:      General: No focal deficit present  Mental Status: She is alert  Additional Data:     Labs:    Results from last 7 days   Lab Units 01/15/22  0510 01/12/22  0634 01/11/22  0506   WBC Thousand/uL 6 83   < > 8 95   HEMOGLOBIN g/dL 11 6   < > 14 2   HEMATOCRIT % 37 3   < > 45 5   PLATELETS Thousands/uL 205   < > 194   NEUTROS PCT %  --   --  79*   LYMPHS PCT %  --   --  11*   MONOS PCT %  --   --  8   EOS PCT %  --   --  2    < > = values in this interval not displayed  Results from last 7 days   Lab Units 01/15/22  0510   POTASSIUM mmol/L 3 7   CHLORIDE mmol/L 108   CO2 mmol/L 24   BUN mg/dL 9   CREATININE mg/dL 0 93   CALCIUM mg/dL 8 9   ALK PHOS U/L 50   ALT U/L 27   AST U/L 28     Results from last 7 days   Lab Units 01/10/22  1043   INR  1 14       * I Have Reviewed All Lab Data Listed Above  * Additional Pertinent Lab Tests Reviewed:  She 64 Admission Reviewed    Imaging:    Imaging Reports Reviewed Today Include:   Imaging Personally Reviewed by Myself Includes:      Recent Cultures (last 7 days):     Results from last 7 days Lab Units 01/14/22  1616 01/14/22  1615 01/12/22  0634 01/10/22  2025 01/10/22  1849   BLOOD CULTURE  No Growth at 24 hrs  No Growth at 24 hrs  No Growth at 72 hrs  No Growth at 72 hrs  Staphylococcus coagulase negative* No Growth After 4 Days  GRAM STAIN RESULT   --   --   --  Gram positive cocci in clusters*  --        Last 24 Hours Medication List:   Current Facility-Administered Medications   Medication Dose Route Frequency Provider Last Rate    acetaminophen  650 mg Oral Q4H PRN Noé Bee MD      albuterol  2 puff Inhalation Q4H PRN Jessica Harrington MD      ammonium lactate   Topical BID PRN Jessica Harrington MD      apixaban  5 mg Oral BID [facility]      benzonatate  100 mg Oral TID PRN Jessica Harrington MD      cefTRIAXone  2,000 mg Intravenous Q24H Vani Solis MD 2,000 mg (01/14/22 2021)    cholecalciferol  1,000 Units Oral Daily Noé Bee MD      ciprofloxacin  1 drop Both Eyes Q4H While Awake Jessica Harrington MD      clobetasol   Topical BID Noé Bee MD      clotrimazole   Topical BID Noé Bee MD      diphenhydrAMINE  25 mg Oral Q6H PRN Jessica Harrington MD      guaiFENesin  600 mg Oral Q12H Orlin Garrison MD      hydrocodone-chlorpheniramine polistirex  5 mL Oral Q12H PRN Jessica Harrington MD      hydroxychloroquine  200 mg Oral BID Noé Bee MD      metoprolol  5 mg Intravenous Q6H PRN Noé Bee MD      metoprolol tartrate  25 mg Oral Q12H [id number] [place] [address] ANTIFUNGAL   Topical BID Jessica Harrington MD      pantoprazole  40 mg Oral Early Morning Noé Bee MD      predniSONE  5 mg Oral Daily Noé Bee MD      sodium chloride (PF)  3 mL Intravenous Q1H PRN Noé Bee MD          Today, Patient Was Seen By: Jessica Harrington MD    ** Please Note: Dictation voice to text software may have been used in the creation of this document   **

## 2022-01-16 NOTE — PLAN OF CARE
Problem: MOBILITY - ADULT  Goal: Maintain or return to baseline ADL function  Description: INTERVENTIONS:  -  Assess patient's ability to carry out ADLs; assess patient's baseline for ADL function and identify physical deficits which impact ability to perform ADLs (bathing, care of mouth/teeth, toileting, grooming, dressing, etc )  - Assess/evaluate cause of self-care deficits   - Assess range of motion  - Assess patient's mobility; develop plan if impaired  - Assess patient's need for assistive devices and provide as appropriate  - Encourage maximum independence but intervene and supervise when necessary  - Involve family in performance of ADLs  - Assess for home care needs following discharge   - Consider OT consult to assist with ADL evaluation and planning for discharge  - Provide patient education as appropriate  Outcome: Progressing  Goal: Maintains/Returns to pre admission functional level  Description: INTERVENTIONS:  - Perform BMAT or MOVE assessment daily    - Set and communicate daily mobility goal to care team and patient/family/caregiver  - Collaborate with rehabilitation services on mobility goals if consulted  - Perform Range of Motion   times a day  - Reposition patient every   hours    - Dangle patient   times a day  - Stand patient   times a day  - Ambulate patient   times a day  - Out of bed to chair   times a day   - Out of bed for meals times a day  - Out of bed for toileting  - Record patient progress and toleration of activity level   Outcome: Progressing

## 2022-01-16 NOTE — PHYSICAL THERAPY NOTE
Physical Therapy Treatment Note       01/16/22 1115   PT Last Visit   PT Visit Date 01/16/22   Note Type   Note Type Treatment for insurance authorization   Pain Assessment   Pain Assessment Tool 0-10   Pain Score 4   Pain Location/Orientation Location: Generalized   Patient's Stated Pain Goal No pain   Hospital Pain Intervention(s) Ambulation/increased activity   Restrictions/Precautions   Weight Bearing Precautions Per Order No   Other Precautions Fall Risk;Telemetry;Multiple lines; Chair Alarm; Bed Alarm;Pain   General   Chart Reviewed Yes   Family/Caregiver Present No   Cognition   Overall Cognitive Status Impaired   Arousal/Participation Responsive   Attention Attends with cues to redirect   Orientation Level Oriented to person;Oriented to place;Oriented to situation   Memory Unable to assess   Following Commands Follows one step commands without difficulty   Subjective   Subjective cooperative w/ session  focused on issues w/ skin, calling dtr, but able to re-direct  assisted w/ calling dtr after session   Bed Mobility   Supine to Sit 3  Moderate assistance   Additional items Assist x 2   Additional Comments sat EOB x several minutes prior to transfer   Transfers   Sit to Stand 3  Moderate assistance   Additional items Assist x 2   Stand to Sit 3  Moderate assistance   Additional items Assist x 2   Toilet transfer 3  Moderate assistance   Additional items Assist x 2   Additional Comments stood for 1-2 min prior to initial gait due to posterior LOB, as well as 3-4 min after use of commode to help clean pt  Ambulation/Elevation   Gait pattern   (slow, antalgic, retropulsion, )   Gait Assistance 3  Moderate assist   Additional items Assist x 2   Assistive Device Rolling walker   Distance 6'x2 from bed to commode, then commode to chair    time spent to reposition   Balance   Static Sitting Fair   Dynamic Sitting Poor +   Static Standing Poor +   Dynamic Standing Poor +   Ambulatory Poor   Endurance Deficit Endurance Deficit Yes   Endurance Deficit Description weakness, fatigue, pain   Activity Tolerance   Activity Tolerance Patient tolerated treatment well;Patient limited by fatigue;Patient limited by pain;Treatment limited secondary to medical complications (Comment)   Nurse Made Aware yes   Assessment   Prognosis Fair   Problem List Decreased strength;Decreased endurance; Impaired balance;Decreased mobility;Pain   Assessment Pt seen for session, portion of which was performed as co-treatment w/ OT due to concerns reL medical stability  PT portion of session focused on mobility, where OT portion focused on ADLs, self care  Session consisted of setup, bed mob, time spent EOB, transfers, standing trials, minimal gait  Pt cooperative w/ session, but needed frequent re-direction to task  moderate to severe posterior LOB noted w/ transfers, and needed mod/max A to correct on initial standing   c/o LE pain w/ gait  continue to anticipate the need for rehab at d/c   Goals   Patient Goals to go home   STG Expiration Date 01/25/22   PT Treatment Day 2   Plan   Treatment/Interventions Functional transfer training;LE strengthening/ROM; Therapeutic exercise; Endurance training;Patient/family training;Equipment eval/education; Bed mobility;Gait training   Progress Progressing toward goals   PT Frequency 3-5x/wk   Recommendation   PT Discharge Recommendation Post acute rehabilitation services   Equipment Recommended [address] [facility] Recommended HD Bariatric wheeled walker   Change/add to [employer]?  No   AM-PAC Basic Mobility Inpatient   Turning in Bed Without Bedrails 1   Lying on Back to Sitting on Edge of Flat Bed 1   Moving Bed to Chair 1   Standing Up From Chair 1   Walk in Room 1   Climb 3-5 Stairs 1   Basic Mobility Inpatient Raw Score 6   Turning Head Towards Sound 4   Follow Simple Instructions 4   Low Function Basic Mobility Raw Score 14   Low Function Basic Mobility Standardized Score 22 01   Highest Level Of Mobility   JH-HLM Goal 2: Bed activities/Dependent transfer   JH-HLM Highest Level of Mobility 5: Stand (1 or more minutes)   JH-HLM Goal Achieved Yes   Madyson Robin PT, DPT CSRS

## 2022-01-16 NOTE — PLAN OF CARE
Problem: PHYSICAL THERAPY ADULT  Goal: Performs mobility at highest level of function for planned discharge setting  See evaluation for individualized goals  Description: Treatment/Interventions: Functional transfer training,LE strengthening/ROM,Therapeutic exercise,Elevations,Endurance training,Patient/family training,Equipment eval/education,Bed mobility,Gait training,Spoke to nursing,Spoke to case management,OT  Equipment Recommended: [employer]       See flowsheet documentation for full assessment, interventions and recommendations  Outcome: Progressing  Note: Prognosis: Fair  Problem List: Decreased strength,Decreased endurance,Impaired balance,Decreased mobility,Pain  Assessment: Pt seen for session, portion of which was performed as co-treatment w/ OT due to concerns reL medical stability  PT portion of session focused on mobility, where OT portion focused on ADLs, self care  Session consisted of setup, bed mob, time spent EOB, transfers, standing trials, minimal gait  Pt cooperative w/ session, but needed frequent re-direction to task  moderate to severe posterior LOB noted w/ transfers, and needed mod/max A to correct on initial standing   c/o LE pain w/ gait  continue to anticipate the need for rehab at d/c  Barriers to Discharge: Inaccessible home environment,Decreased caregiver support        PT Discharge Recommendation: Post acute rehabilitation services          See flowsheet documentation for full assessment

## 2022-01-16 NOTE — PROGRESS NOTES
Progress Note - Infectious Disease   Gureita Al 66 y o  female MRN: [id number]  [facility] Encounter: [id number]      Impression/Plan:    1  Erythroderma: Two punch biopsies were performed at bedside 22 both reading psoriasiform dermatitis with intracorneal neutrophilic aggregates  This can be seen with pustular psoriasis  Performed skin shave biopsy today and sent for DIF for further evaluation  Risks, benefits, and alternatives were discussed with the patient and daughter  Written consent was obtained from the patient  In the meantime, patient can start applying triamcinolone 0 1% ointment to the affected areas 2-4x daily  We will arrange for outpatient follow up pending discharge  Subjective:  Patient is lying comfortably in bed during exam  She denies systemic symptoms at this time  No other new complaints  Objective:  Vitals:  Temp:  [98 6 °F (37 °C)-99 5 °F (37 5 °C)] 99 °F (37 2 °C)  HR:  [77-89] 79  Resp:  [18-20] 18  BP: (111-116)/(52-54) 116/53  SpO2:  [96 %-98 %] 98 %  Temp (24hrs), Av °F (37 2 °C), Min:98 6 °F (37 °C), Max:99 5 °F (37 5 °C)  Current: Temperature: 99 °F (37 2 °C)    Physical Exam:   General Appearance:  Alert, interactive, nontoxic, no acute distress  Skin: Diffuse erythroderma with desquamation          Labs, Imaging, & Other studies:   All pertinent labs and imaging studies were personally reviewed  Results from last 7 days   Lab Units 01/15/22  0510 22  0614 22  0536   WBC Thousand/uL 6 83 6 39 5 20   HEMOGLOBIN g/dL 11 6 11 8 12 0   PLATELETS Thousands/uL 205 204 186     Results from last 7 days   Lab Units 01/15/22  0510 22  0614 22  0614 22  0536 22  0536 22  0634 22  0634   SODIUM mmol/L 140  --  138  --  140   < > 141   POTASSIUM mmol/L 3 7   < > 3 9   < > 4 0   < > 3 8   CHLORIDE mmol/L 108   < > 106   < > 108   < > 108   CO2 mmol/L 24   < > 26   < > 26   < > 25   BUN mg/dL 9   < > 9   < > 9   < > 15 CREATININE mg/dL 0 93   < > 0 87   < > 0 78   < > 0 84   EGFR ml/min/1 73sq m 61   < > 66   < > 75   < > 69   CALCIUM mg/dL 8 9   < > 8 9   < > 9 0   < > 8 8   AST U/L 28  --  31  --   --   --  25   ALT U/L 27   < > 31  --   --    < > 21   ALK PHOS U/L 50   < > 57  --   --    < > 58    < > = values in this interval not displayed  Results from last 7 days   Lab Units 01/14/22  1616 01/14/22  1615 01/12/22  0634 01/10/22  2025 01/10/22  1849   BLOOD CULTURE  No Growth at 24 hrs  No Growth at 24 hrs  No Growth After 4 Days  No Growth After 4 Days  Staphylococcus coagulase negative* No Growth After 5 Days     GRAM STAIN RESULT   --   --   --  Gram positive cocci in clusters*  --      Results from last 7 days   Lab Units 01/15/22  0510 01/14/22  1617 01/13/22  0536 01/12/22  0634 01/11/22  0506 01/10/22  1642   PROCALCITONIN ng/ml 0 23 0 22 0 26* 0 42* 0 46* 0 33*

## 2022-01-16 NOTE — ASSESSMENT & PLAN NOTE
· Evolving since admission  · Patient with fever and tachycardia  · Appears weak, lethargic, + cough  · Send Blood cultures, check UA, CXR negative - chest CT is negative for any acute pathology  CT abdomen is rather unremarkable   · COVID/flu/RSV negative  · procalcitonin 0 33-procalcitonin today is 0 4  · 1/2 sets of blood culture came back positive for Gram-positive cocci in umhstgtw-cboopartx-lpatnueo Staph likely contaminant  Will discontinue  · Follow-up on final culture  May need Infectious Disease evaluation if it is a true infection  · Continue with the antibiotics and recheck procalcitonin vancomycin and follow-up on the final culture  · Urine strep antigen is positive-discontinue doxycycline and continue with ceftriaxone-patient has previous pencil in allergy and tolerated ceftriaxone  · Patient had a low-grade fever 100 1    Given her history of immunocompromise will get ID evaluation given persistent cough and low-grade fever  · Repeat chest x-ray was unremarkable as today for any acute infectious process

## 2022-01-16 NOTE — ASSESSMENT & PLAN NOTE
· Patient noted to have diffuse rash on her skin  Skin is dry and peeling  · No definite diagnosis  Has been going on for the past 5 years started after being on IV antibiotics for a dental infection  · Family reported that the diagnosis of psoriasis was ruled out since the patient did not get better while on Humira for rheumatoid arthritis  · Dermatology evaluation appreciated  Erythroderma  Biopsy done -results pending  Outpatient follow-up with the dermatology  · Biopsy is back    Possible pustular dermatitis-

## 2022-01-16 NOTE — OCCUPATIONAL THERAPY NOTE
Occupational Therapy Progress Note     Patient Name: Emir Griffin  QPBJS'M Date: 1/16/2022  Problem List  Principal Problem:    Sepsis ([facility] [address] )  Active Problems:    Rheumatoid arthritis ([facility] [address] )    GERD (gastroesophageal reflux disease)    Benign essential hypertension    Erythroderma    Murmur, cardiac    New onset a-fib (H[facility])    Lethargy    Vomiting          01/16/22 1106   OT Last Visit   OT Visit Date 01/16/22   Note Type   Note Type Treatment for insurance authorization   Restrictions/Precautions   Weight Bearing Precautions Per Order No   Other Precautions Fall Risk;Pain;Bed Alarm; Chair Alarm   General   Response to Previous Treatment Patient with no complaints from previous session   Lifestyle   Autonomy At baseline pt was completing ADLs IND, IADLs with assist, ambulating MOD IND with cane, (-) driving (van transportation)  Reciprocal Relationships supportive family   Service to Others retired   [address] enjoys watching TV   Pain Assessment   Pain Assessment Tool 0-10   Pain Score No Pain   ADL   Grooming Assistance 5  Supervision/Setup   Grooming Deficit Supervision/safety; Increased time to complete;Brushing hair   Grooming Comments Pt completed supine in bed w SUP   UB Dressing Assistance 4  Minimal Assistance   UB Dressing Deficit Setup;Supervision/safety; Increased time to complete; Thread RUE; Thread LUE   UB Dressing Comments Pt donned/doffed hospital gown w Lexus supine in bed    LB Dressing Assistance 3  Moderate Assistance   LB Dressing Deficit Setup;Supervision/safety; Increased time to complete; Don/doff R shoe;Don/doff L shoe   LB Dressing Comments Pt donned b/l slippers w modA sitting EOB    Toileting Assistance  2  Maximal Assistance   Toileting Deficit Setup; Increased time to complete;Supervison/safety; Bedside commode;Perineal hygiene   Toileting Comments Pt requires maxA for toileting using bedside commode for perineal hygiene    Bed Mobility   Supine to Sit 3  Moderate assistance Additional items Assist x 2;HOB elevated; Bedrails; Increased time required;Verbal cues;LE management   Sit to Supine Unable to assess   Additional Comments Pt OOB in chair to end OT treat    Transfers   Sit to Stand 3  Moderate assistance   Additional items Assist x 2; Increased time required;Verbal cues   Stand to Sit 3  Moderate assistance   Additional items Assist x 2; Increased time required;Verbal cues   Stand pivot 3  Moderate assistance   Additional items Assist x 2; Increased time required;Verbal cues   Additional Comments RW used for transfers   Functional Mobility   Functional Mobility 3  Moderate assistance   Additional Comments A X2; increased time required; [facility]    Additional items Rolling walker   Toilet Transfers   Toilet Transfer From Rolling walker   Toilet Transfer Type To and from   Toilet Transfer to Standard bedside commode   Toilet Transfer Technique Ambulating   Toilet Transfers Moderate assistance   Toilet Transfers Comments Mod A X2 to tranfers to bedside commode   Cognition   Overall Cognitive Status Impaired   Arousal/Participation Alert; Responsive; Cooperative   Attention Within functional limits   Orientation Level Oriented X4   Memory Within functional limits   Following Commands Follows all commands and directions without difficulty   Comments Pt pleasant,cooperative, and willing to participate in OT treat    Activity Tolerance   Activity Tolerance Patient limited by fatigue   Medical Staff Made Aware Co-treat w PT 2* assistance required for skilled transfers; RN cleared pt for OT treat    Assessment   Assessment Upon arrival of OT, pt was supine in bed  Pt participated in skilled OT session this date with interventions consisting of ADL re training with the use of correct body mechnaics, safety awareness and fall prevention techniques and  functional transfer training*   In comparison to previous session, pt demonstrates improvements in activity tolerance   Pt continues to be functioning below baseline level  Pt demonstrated the following tasks: modA X2 supine to sit, modA X2 sit to stand, modA X2 for functional ambulation using a RW  Pt completed the following ADLs: grooming and UB dressing supine in bed w Lexus, LB dressing sitting EOB w modA, and required maxA for toileting using bedside commode this date  Occupational performance remains limited secondary to factors listed above and increased risk for falls and injury  From OT standpoint, recommendation at time of d/c would be Short Term Rehab  Patient to benefit from continued Occupational Therapy treatment while in the hospital to address deficits as defined above and maximize level of functional independence with ADLs and functional mobility  Upon completion of OT session pt was left OOB in chair w all current needs met and call bell within reach  The patient's raw score on the AM-PAC Daily Activity inpatient short form is 16, standardized score is 35 96, less than 39 4  Patients at this level are likely to benefit from discharge to post-acute rehabilitation services  Please refer to the recommendation of the Occupational Therapist for safe discharge planning     Plan   Treatment Interventions ADL retraining;Functional transfer training   Goal Expiration Date 01/25/22   OT Treatment Day 2   OT Frequency 3-5x/wk   Recommendation   OT Discharge Recommendation Post acute rehabilitation services   Equipment Recommended Bedside commode   Commode Type Standard   OT - OK to Discharge Yes  (When medically appropriate )   AM-PAC Daily Activity Inpatient   Lower Body Dressing 2   Bathing 2   Toileting 2   Upper Body Dressing 3   Grooming 3   Eating 4   Daily Activity Raw Score 16   Daily Activity Standardized Score (Calc for Raw Score >=11) 35 96   AM-PAC Applied Cognition Inpatient   Following a Speech/Presentation 3   Understanding Ordinary Conversation 4   Taking Medications 3   Remembering Where Things Are Placed or Put Away 3   Remembering List of 4-5 Errands 3   Taking Care of Complicated Tasks 3   Applied Cognition Raw Score 19   Applied Cognition Standardized Score 39 77       Luciana Kappa, OTR/L

## 2022-01-17 LAB
ANION GAP SERPL CALCULATED.3IONS-SCNC: 5 MMOL/L (ref 4–13)
BACTERIA BLD CULT: NORMAL
BACTERIA BLD CULT: NORMAL
BUN SERPL-MCNC: 10 MG/DL (ref 5–25)
CALCIUM SERPL-MCNC: 9.1 MG/DL (ref 8.3–10.1)
CHLORIDE SERPL-SCNC: 108 MMOL/L (ref 100–108)
CO2 SERPL-SCNC: 27 MMOL/L (ref 21–32)
CREAT SERPL-MCNC: 0.78 MG/DL (ref 0.6–1.3)
ERYTHROCYTE [DISTWIDTH] IN BLOOD BY AUTOMATED COUNT: 14.6 % (ref 11.6–15.1)
GFR SERPL CREATININE-BSD FRML MDRD: 75 ML/MIN/1.73SQ M
GLUCOSE SERPL-MCNC: 82 MG/DL (ref 65–140)
HCT VFR BLD AUTO: 38.8 % (ref 34.8–46.1)
HGB BLD-MCNC: 11.8 G/DL (ref 11.5–15.4)
HTLV I+II AB SER QL IA: NEGATIVE
MCH RBC QN AUTO: 27.1 PG (ref 26.8–34.3)
MCHC RBC AUTO-ENTMCNC: 30.4 G/DL (ref 31.4–37.4)
MCV RBC AUTO: 89 FL (ref 82–98)
PLATELET # BLD AUTO: 225 THOUSANDS/UL (ref 149–390)
PMV BLD AUTO: 11.3 FL (ref 8.9–12.7)
POTASSIUM SERPL-SCNC: 3.5 MMOL/L (ref 3.5–5.3)
RBC # BLD AUTO: 4.36 MILLION/UL (ref 3.81–5.12)
SODIUM SERPL-SCNC: 140 MMOL/L (ref 136–145)
WBC # BLD AUTO: 4.33 THOUSAND/UL (ref 4.31–10.16)

## 2022-01-17 PROCEDURE — 85027 COMPLETE CBC AUTOMATED: CPT | Performed by: FAMILY MEDICINE

## 2022-01-17 PROCEDURE — 99232 SBSQ HOSP IP/OBS MODERATE 35: CPT | Performed by: FAMILY MEDICINE

## 2022-01-17 PROCEDURE — 99232 SBSQ HOSP IP/OBS MODERATE 35: CPT | Performed by: STUDENT IN AN ORGANIZED HEALTH CARE EDUCATION/TRAINING PROGRAM

## 2022-01-17 PROCEDURE — 80048 BASIC METABOLIC PNL TOTAL CA: CPT | Performed by: FAMILY MEDICINE

## 2022-01-17 PROCEDURE — 92610 EVALUATE SWALLOWING FUNCTION: CPT

## 2022-01-17 RX ORDER — FUROSEMIDE 10 MG/ML
40 INJECTION INTRAMUSCULAR; INTRAVENOUS ONCE
Status: COMPLETED | OUTPATIENT
Start: 2022-01-17 | End: 2022-01-17

## 2022-01-17 RX ORDER — HYDROCHLOROTHIAZIDE 12.5 MG/1
12.5 TABLET ORAL DAILY
Status: DISCONTINUED | OUTPATIENT
Start: 2022-01-17 | End: 2022-01-26 | Stop reason: HOSPADM

## 2022-01-17 RX ADMIN — HYDROXYCHLOROQUINE SULFATE 200 MG: 200 TABLET, FILM COATED ORAL at 08:15

## 2022-01-17 RX ADMIN — HYDROCHLOROTHIAZIDE 12.5 MG: 12.5 TABLET ORAL at 13:28

## 2022-01-17 RX ADMIN — HYDROXYCHLOROQUINE SULFATE 200 MG: 200 TABLET, FILM COATED ORAL at 17:13

## 2022-01-17 RX ADMIN — PREDNISONE 5 MG: 5 TABLET ORAL at 08:14

## 2022-01-17 RX ADMIN — CLOTRIMAZOLE: 1 CREAM TOPICAL at 08:15

## 2022-01-17 RX ADMIN — FUROSEMIDE 40 MG: 10 INJECTION, SOLUTION INTRAMUSCULAR; INTRAVENOUS at 13:25

## 2022-01-17 RX ADMIN — GUAIFENESIN 600 MG: 600 TABLET, EXTENDED RELEASE ORAL at 22:25

## 2022-01-17 RX ADMIN — PANTOPRAZOLE SODIUM 40 MG: 40 TABLET, DELAYED RELEASE ORAL at 05:18

## 2022-01-17 RX ADMIN — CLOBETASOL PROPIONATE: 0.5 CREAM TOPICAL at 08:16

## 2022-01-17 RX ADMIN — TRIAMCINOLONE ACETONIDE: 1 OINTMENT TOPICAL at 12:22

## 2022-01-17 RX ADMIN — Medication 1000 UNITS: at 08:14

## 2022-01-17 RX ADMIN — APIXABAN 5 MG: 5 TABLET, FILM COATED ORAL at 17:12

## 2022-01-17 RX ADMIN — METOPROLOL TARTRATE 25 MG: 25 TABLET, FILM COATED ORAL at 22:25

## 2022-01-17 RX ADMIN — MICONAZOLE NITRATE: 20 CREAM TOPICAL at 08:15

## 2022-01-17 RX ADMIN — MICONAZOLE NITRATE: 20 CREAM TOPICAL at 17:13

## 2022-01-17 RX ADMIN — ACETAMINOPHEN 650 MG: 325 TABLET, FILM COATED ORAL at 22:29

## 2022-01-17 RX ADMIN — METOPROLOL TARTRATE 25 MG: 25 TABLET, FILM COATED ORAL at 08:14

## 2022-01-17 RX ADMIN — TRIAMCINOLONE ACETONIDE: 1 OINTMENT TOPICAL at 17:13

## 2022-01-17 RX ADMIN — CLOTRIMAZOLE: 1 CREAM TOPICAL at 17:13

## 2022-01-17 RX ADMIN — GUAIFENESIN 600 MG: 600 TABLET, EXTENDED RELEASE ORAL at 08:14

## 2022-01-17 RX ADMIN — Medication: at 08:15

## 2022-01-17 RX ADMIN — APIXABAN 5 MG: 5 TABLET, FILM COATED ORAL at 08:14

## 2022-01-17 NOTE — RESTORATIVE TECHNICIAN NOTE
Restorative Technician Note      Patient Name: Sancho Fuller     Note Type: Mobility  Patient Position Upon Consult: Supine  Activity Performed: Repositioned

## 2022-01-17 NOTE — ASSESSMENT & PLAN NOTE
· Suspect from sepsis  · Head CT is unremarkable for acute pathology  · Neuro checks  · Lethargy significantly better  · Patient had temp of 100 1° today with encephalopathy    Encephalopathy improved-likely related to fever  ·  resolved

## 2022-01-17 NOTE — ASSESSMENT & PLAN NOTE
· Patient noted to have diffuse rash on her skin  Skin is dry and peeling  · No definite diagnosis  Has been going on for the past 5 years started after being on IV antibiotics for a dental infection  · Family reported that the diagnosis of psoriasis was ruled out since the patient did not get better while on Humira for rheumatoid arthritis  · Dermatology evaluation appreciated  Erythroderma  Biopsy done -results pending  Outpatient follow-up with the dermatology  · Biopsy is back    Possible pustular dermatitis-  ·  s/p shave biopsy today

## 2022-01-17 NOTE — PROGRESS NOTES
Progress Note - Infectious Disease   Monica Al 66 y o  female MRN: [id number]  Unit/Bed#: [facility] 531-01 Encounter: [id number]      Impression/Plan:    1  Sepsis-evolving since admission  Tachycardia, fevers, leukocytosis on admission  CT imaging on admission did not show evidence of pneumonia  Strep pneumoniae urine antigen positive, consider bronchitis or sinusitis  CT A/P no abnormalities  COVID/flu/RSV PCR negative  Has chronic skin erythroderma, but does not appear to have areas of cellulitis  No other clear source of infection  Procalcitonin improving  =Also consider viral etiology given prolonged cough, sinus congestion, and eye discharge in immunocompromised patient  At this point, she has completed 7 days of antibiotics for possible bronchitis and is clinically improved with resolution of fever  Persistent cough likely post-infectious  -monitor off antibiotics               -supportive care for cough     2  Coagulase negative staph in blood culture  1/2 sets on admission growing coagulase negative staph  Likely contaminant  Repeat cultures no growth  -no further treatment needed     3  Erythroderma  Patient with worsening skin rash over last 5 years  Did not improve on Humira  Status post skin biopsy 1/12 by Dermatology, pathology most consistent with pustular psoriasis  -Dermatology follow up              -local skin care              -sent HTLV1 Ab     4  Rheumatoid arthritis  On plaquenil and prednisone  Patient is immunocompromised      5  Afib   New onset  Started on metoprolol and Eliquis       6  AMANDA   Present on admission in setting of #1  Now improved     I have discussed the above management plan in detail with the primary service  ID will sign off, please call back with questions  Antibiotics:  S/p 7 days Ceftriaxone  Off abx D1    Subjective:  Patient is feeling okay today  Fever has resolved   She did not sleep well due to persistent nonproductive cough  No other issues, no abdominal pain, nausea, chills, fever  Objective:  Vitals:  Temp:  [97 6 °F (36 4 °C)-99 3 °F (37 4 °C)] 99 3 °F (37 4 °C)  HR:  [76-87] 82  Resp:  [17] 17  BP: (109-134)/(47-66) 124/66  SpO2:  [94 %-97 %] 96 %  Temp (24hrs), Av 1 °F (36 7 °C), Min:97 6 °F (36 4 °C), Max:99 3 °F (37 4 °C)  Current: Temperature: 99 3 °F (37 4 °C)    Physical Exam:   General Appearance:  Alert, interactive, nontoxic, no acute distress  Throat: Oropharynx moist without lesions  Lungs:   Clear to auscultation bilaterally; no wheezes, rhonchi or rales; respirations unlabored   Heart:  RRR; no murmur, rub or gallop   Abdomen:   Soft, non-tender, non-distended, positive bowel sounds  Extremities: No clubbing, cyanosis or edema   Skin: No new rashes or lesions  No draining wounds noted  Labs: All pertinent labs and imaging studies were personally reviewed  Results from last 7 days   Lab Units 22  0627 01/15/22  0510 22  0614   WBC Thousand/uL 4 33 6 83 6 39   HEMOGLOBIN g/dL 11 8 11 6 11 8   PLATELETS Thousands/uL 225 205 204     Results from last 7 days   Lab Units 22  0627 01/15/22  0510 01/15/22  0510 22  0614 22  0614 22  0536 22  0634   SODIUM mmol/L 140  --  140  --  138   < > 141   POTASSIUM mmol/L 3 5   < > 3 7   < > 3 9   < > 3 8   CHLORIDE mmol/L 108   < > 108   < > 106   < > 108   CO2 mmol/L 27   < > 24   < > 26   < > 25   BUN mg/dL 10   < > 9   < > 9   < > 15   CREATININE mg/dL 0 78   < > 0 93   < > 0 87   < > 0 84   EGFR ml/min/1 73sq m 75   < > 61   < > 66   < > 69   CALCIUM mg/dL 9 1   < > 8 9   < > 8 9   < > 8 8   AST U/L  --   --  28  --  31  --  25   ALT U/L  --   --  27   < > 31  --  21   ALK PHOS U/L  --   --  50   < > 57  --  58    < > = values in this interval not displayed       Results from last 7 days   Lab Units 01/15/22  0510 22  1617 22  0536 22  0634 22  0506 01/10/22  1642   PROCALCITONIN ng/ml 0  23 0 22 0 26* 0 42* 0 46* 0 33*                   Micro:  Results from last 7 days   Lab Units 01/14/22  1616 01/14/22  1615 01/12/22  0634 01/10/22  2025 01/10/22  1849   BLOOD CULTURE  No Growth at 48 hrs  No Growth at 48 hrs  No Growth After 5 Days  No Growth After 5 Days  Staphylococcus coagulase negative* No Growth After 5 Days  GRAM STAIN RESULT   --   --   --  Gram positive cocci in clusters*  --        Imaging:  I have personally reviewed pertinent imaging study reports and images in PACS      CT Chest-no consolidations     Other Studies:   I have personally reviewed pertinent reports

## 2022-01-17 NOTE — PROGRESS NOTES
[address] 1950, 66 y o  female MRN: [id number]  Unit/Bed#: [facility] 531-01 Encounter: [id number]  Primary Care Provider: Leslie Stephenson DO   Date and time admitted to hospital: 1/10/2022 10:11 AM    * Sepsis [facility])  Assessment & Plan  · Evolving since admission  · Patient with fever and tachycardia  · Appears weak, lethargic, + cough  · Send Blood cultures, check UA, CXR negative - chest CT is negative for any acute pathology  CT abdomen is rather unremarkable   · COVID/flu/RSV negative  · procalcitonin 0 33-procalcitonin today is 0 4  · 1/2 sets of blood culture came back positive for Gram-positive cocci in [id number]-lauhchho Staph likely contaminant  Will discontinue  · Follow-up on final culture  May need Infectious Disease evaluation if it is a true infection  · Continue with the antibiotics and recheck procalcitonin vancomycin and follow-up on the final culture  · Urine strep antigen is positive-discontinue doxycycline and continue with ceftriaxone-patient has previous pencil in allergy and tolerated ceftriaxone  · Patient had a low-grade fever 100 1  Given her history of immunocompromise will get ID evaluation given persistent cough and low-grade fever  · Repeat chest x-ray was unremarkable as today for any acute infectious process    New onset a-fib [facility])  Assessment & Plan  · On presentation was in afib RVR with -120s  · After IV lopressor x1, converted to NSR  · Seen by cardio -> cont lopressor 25 BID, switched to PO eliquis  · Will need eliquis price check at the time of discharge  · Patient remained in sinus rhythm    Murmur, cardiac  Assessment & Plan  · Noticed by PCP, was ordered OP echo  · IP echo-no significant valvular abnormality  · Cardiology on board    Erythroderma  Assessment & Plan  · Patient noted to have diffuse rash on her skin  Skin is dry and peeling  · No definite diagnosis    Has been going on for the past 5 years started after being on IV antibiotics for a dental infection  · Family reported that the diagnosis of psoriasis was ruled out since the patient did not get better while on Humira for rheumatoid arthritis  · Dermatology evaluation appreciated  Erythroderma  Biopsy done -results pending  Outpatient follow-up with the dermatology  · Biopsy is back  Possible pustular dermatitis-  ·  s/p shave biopsy today    Benign essential hypertension  Assessment & Plan  · Hold amlodipine as starting metoprolol for rate control  · Blood pressure appears to be stable on metoprolol    Rheumatoid arthritis (HCC)  Assessment & Plan  · Continue plaquenil, prednisone 5 mg QD    Vomiting-resolved as of 2022  Assessment & Plan  · Appears to be resolved  Patient tolerating diet      Lethargy-resolved as of 2022  Assessment & Plan  · Suspect from sepsis  · Head CT is unremarkable for acute pathology  · Neuro checks  · Lethargy significantly better  · Patient had temp of 100 1° today with encephalopathy  Encephalopathy improved-likely related to fever  ·  resolved      VTE Pharmacologic Prophylaxis:   Pharmacologic: Apixaban (Eliquis)  Mechanical VTE Prophylaxis in Place: Yes    Patient Centered Rounds: d/w nursing  Discussions with Specialists or Other Care Team Provider:     Education and Discussions with Family / Patient: patient    Time Spent for Care: 30 minutes  More than 50% of total time spent on counseling and coordination of care as described above      Current Length of Stay: 6 day(s)    Current Patient Status: Inpatient   Certification Statement: The patient will continue to require additional inpatient hospital stay due to pending rehab    Discharge Plan:     Code Status: Level 1 - Full Code      Subjective:   Patient seen and examined, no events overnight, s/p shave biopsy    Objective:     Vitals:   Temp (24hrs), Av 7 °F (37 1 °C), Min:97 8 °F (36 6 °C), Max:99 5 °F (37 5 °C)    Temp:  [97 8 °F (36 6 °C)-99 5 °F (37 5 °C)] 97 8 °F (36 6 °C)  HR:  [77-89] 87  Resp:  [18-20] 18  BP: (111-134)/(52-57) 134/57  SpO2:  [96 %-98 %] 97 %  Body mass index is 43 78 kg/m²  Input and Output Summary (last 24 hours): Intake/Output Summary (Last 24 hours) at 1/16/2022 1923  Last data filed at 1/16/2022 1500  Gross per 24 hour   Intake 358 ml   Output 500 ml   Net -142 ml       Physical Exam:     Physical Exam  HENT:      Head: Normocephalic  Right Ear: Tympanic membrane normal       Left Ear: Tympanic membrane normal       Nose: Nose normal    Cardiovascular:      Rate and Rhythm: Normal rate  Pulses: Normal pulses  Pulmonary:      Effort: Pulmonary effort is normal    Musculoskeletal:      Cervical back: Normal range of motion  Skin:     Findings: Rash present  Neurological:      General: No focal deficit present  Mental Status: She is alert  Additional Data:     Labs:    Results from last 7 days   Lab Units 01/15/22  0510 01/12/22  0634 01/11/22  0506   WBC Thousand/uL 6 83   < > 8 95   HEMOGLOBIN g/dL 11 6   < > 14 2   HEMATOCRIT % 37 3   < > 45 5   PLATELETS Thousands/uL 205   < > 194   NEUTROS PCT %  --   --  79*   LYMPHS PCT %  --   --  11*   MONOS PCT %  --   --  8   EOS PCT %  --   --  2    < > = values in this interval not displayed  Results from last 7 days   Lab Units 01/15/22  0510   POTASSIUM mmol/L 3 7   CHLORIDE mmol/L 108   CO2 mmol/L 24   BUN mg/dL 9   CREATININE mg/dL 0 93   CALCIUM mg/dL 8 9   ALK PHOS U/L 50   ALT U/L 27   AST U/L 28     Results from last 7 days   Lab Units 01/10/22  1043   INR  1 14       * I Have Reviewed All Lab Data Listed Above  * Additional Pertinent Lab Tests Reviewed:  She 64 Admission Reviewed    Imaging:    Imaging Reports Reviewed Today Include:   Imaging Personally Reviewed by Myself Includes:      Recent Cultures (last 7 days):     Results from last 7 days   Lab Units 01/14/22  1616 01/14/22  1615 01/12/22  0634 01/10/22  2025 01/10/22  1849   BLOOD CULTURE  No Growth at 48 hrs  No Growth at 48 hrs  No Growth After 4 Days  No Growth After 4 Days  Staphylococcus coagulase negative* No Growth After 5 Days  GRAM STAIN RESULT   --   --   --  Gram positive cocci in clusters*  --        Last 24 Hours Medication List:   Current Facility-Administered Medications   Medication Dose Route Frequency Provider Last Rate    acetaminophen  650 mg Oral Q4H PRN King Gilmer MD      albuterol  2 puff Inhalation Q4H PRN Sean Gonzalez MD      ammonium lactate   Topical BID PRN Sean Gonzalez MD      apixaban  5 mg Oral BID Shawanda, DO      benzonatate  100 mg Oral TID PRN Sean Gonzalez MD      cefTRIAXone  2,000 mg Intravenous Q24H Sean Gonzalez MD 2,000 mg (01/15/22 2054)    cholecalciferol  1,000 Units Oral Daily King Gilmer MD      clobetasol   Topical BID King Gilmer MD      clotrimazole   Topical BID King Gilmer MD      diphenhydrAMINE  25 mg Oral Q6H PRN Sean Gonzalez MD      guaiFENesin  600 mg Oral Q12H [address] Sean Gonzalez MD      hydrocodone-chlorpheniramine polistirex  5 mL Oral Q12H PRN Sean Gonzalez MD      hydroxychloroquine  200 mg Oral BID King Gilmer MD      metoprolol  5 mg Intravenous Q6H PRN King Gilmer MD      metoprolol tartrate  25 mg Oral Q12H [id number] [facility],       MAGALY ANTIFUNGAL   Topical BID Sean Gonzalez MD      pantoprazole  40 mg Oral Early Morning King Gilmer MD      predniSONE  5 mg Oral Daily King Gilmer MD      sodium chloride (PF)  3 mL Intravenous Q1H PRN King Gilmer MD          Today, Patient Was Seen By: Sean Gonzalez MD    ** Please Note: Dictation voice to text software may have been used in the creation of this document   **

## 2022-01-17 NOTE — SPEECH THERAPY NOTE
Speech Language/Pathology    Speech-Language Pathology Bedside Swallow Evaluation      Patient Name: Aditya Cruz AEURV'Z Date: 2022     Problem List  Principal Problem:    Sepsis ([facility] [address] )  Active Problems:    Rheumatoid arthritis ([facility] [address] )    GERD (gastroesophageal reflux disease)    Benign essential hypertension    Erythroderma    Murmur, cardiac    New onset a-fib [facility])      Past Medical History  Past Medical History:   Diagnosis Date    Abnormal thyroid function test     last assessed: 2015     Arthritis     Caries     last assessed: 2016     Edema of right lower extremity     last assessed: 2015     GERD (gastroesophageal reflux disease)     Hypertension     Sarcoid        Past Surgical History  Past Surgical History:   Procedure Laterality Date     SECTION      MULTIPLE TOOTH EXTRACTIONS N/A 2016    Procedure: Surgical extraction of teeth 2, 18, 19, 30, 31; incision and drainage of left subperiosteal abscess ;  Surgeon: Gem Berman DMD;  Location:  MAIN OR;  Service:        Summary   Pt presented with functional appearing oral and pharyngeal stage swallowing skills with materials administered today  No significant oral difficulties, all manipulation is timely and effective  Swallows appear prompt  No overt s/s aspiration across trials  Pt denies dysphagia, states difficulty this am was because she "wasn't feeling up to it after coughing all night " Pt denies s/s aspiration w/ meals since being on regular texture diet       Risk/s for Aspiration: Mild      Recommended Diet: regular diet and thin liquids   Recommended Form of Meds: whole with liquid   Aspiration precautions and swallowing strategies: upright posture, only feed when fully alert, slow rate of feeding and small bites/sips  Other Recommendations: Continue frequent oral care, consider VBS to rule out aspiration if medically indicated         Current Medical Status  Pt is a 66 y o  female who presented to [facility]  Sepsis ([facility] [address] )  Assessment & Plan  · Evolving since admission  · Patient with fever and tachycardia  · Appears weak, lethargic, + cough  · Send Blood cultures, check UA, CXR negative - chest CT is negative for any acute pathology  CT abdomen is rather unremarkable   · COVID/flu/RSV negative  · procalcitonin 0 33-procalcitonin today is 0 4  · 1/2 sets of blood culture came back positive for Gram-positive cocci in [id number] Staph likely contaminant  Will discontinue  · Follow-up on final culture  May need Infectious Disease evaluation if it is a true infection  · Continue with the antibiotics and recheck procalcitonin vancomycin and follow-up on the final culture  · Urine strep antigen is positive-discontinue doxycycline and continue with ceftriaxone-patient has previous pencil in allergy and tolerated ceftriaxone  · Patient had a low-grade fever 100 1  Given her history of immunocompromise will get ID evaluation given persistent cough and low-grade fever  · Repeat chest x-ray was unremarkable as today for any acute infectious process     New onset a-fib [facility])  Assessment & Plan  · On presentation was in afib RVR with -120s  · After IV lopressor x1, converted to NSR  · Seen by cardio -> cont lopressor 25 BID, switched to PO eliquis  · Will need eliquis price check at the time of discharge  · Patient remained in sinus rhythm     Murmur, cardiac  Assessment & Plan  · Noticed by PCP, was ordered OP echo  · IP echo-no significant valvular abnormality  · Cardiology on board     Erythroderma  Assessment & Plan  · Patient noted to have diffuse rash on her skin  Skin is dry and peeling  · No definite diagnosis    Has been going on for the past 5 years started after being on IV antibiotics for a dental infection  · Family reported that the diagnosis of psoriasis was ruled out since the patient did not get better while on Humira for rheumatoid arthritis  · Dermatology evaluation appreciated  Erythroderma  Biopsy done -results pending  Outpatient follow-up with the dermatology  · Biopsy is back  Possible pustular dermatitis-  ·  s/p shave biopsy today     Benign essential hypertension  Assessment & Plan  · Hold amlodipine as starting metoprolol for rate control  · Blood pressure appears to be stable on metoprolol     Rheumatoid arthritis (HCC)  Assessment & Plan  · Continue plaquenil, prednisone 5 mg QD     Vomiting-resolved as of 1/16/2022  Assessment & Plan  · Appears to be resolved  Patient tolerating diet       Lethargy-resolved as of 1/16/2022  Assessment & Plan  · Suspect from sepsis  · Head CT is unremarkable for acute pathology  · Neuro checks  · Lethargy significantly better  · Patient had temp of 100 1° today with encephalopathy  Encephalopathy improved-likely related to fever  ·  resolved          Current Precautions: Allergies:  No known food allergies    Past medical history:  Please see H&P for details    Special Studies:  CXR 1/13/22: Cardiomediastinal silhouette appears unremarkable      Increased pulmonary vascular congestion is noted as compared to the prior study  No focal infiltrate is seen  No pneumothorax or pleural effusion      Osseous structures appear within normal limits for patient age      IMPRESSION:     Mild increased pulmonary vascular congestion      Social/Education/Vocational Hx:  Pt lives with family    Swallow Information   Current Risks for Dysphagia & Aspiration: age, current medical   Current Symptoms/Concerns: RN reported difficulty w/ breakfast, reduced mastication and cough   Current Diet: regular diet and thin liquids   Baseline Diet: regular diet and thin liquids      Baseline Assessment   Behavior/Cognition: alert  Speech/Language Status: able to participate in conversation and able to follow commands  Patient Positioning: upright in bed  Pain Status/Interventions/Response to Interventions:   No report of or nonverbal indications of pain  Swallow Mechanism Exam  Facial: symmetrical  Labial: WFL  Lingual: WFL  Velum: symmetrical  Mandible: adequate ROM  Dentition: adequate  Vocal quality:clear/adequate   Volitional Cough: strong/productive   Respiratory Status: on RA     Consistencies Assessed and Performance   Consistencies Administered: thin liquids and hard solids      Oral Stage: WFL  Mastication was adequate with the materials administered today  Bolus formation and transfer were functional with no significant oral residue noted  No overt s/s reduced oral control  Pharyngeal Stage: WFL  Swallow Mechanics:  Swallowing initiation appeared prompt  Laryngeal rise was palpated and judged to be within functional limits  No coughing, throat clearing, change in vocal quality or respiratory status noted today  Esophageal Concerns: none reported    Strategies and Efficacy: -    Summary and Recommendations (see above)    Results Reviewed with: patient, RN and MD     Treatment Recommended: Yes if VBS is indicated/desired by primary medical team      Dysphagia LTG  -Patient will demonstrate safe and effective oral intake (without overt s/s significant oral/pharyngeal dysphagia including s/s penetration or aspiration) for the highest appropriate diet level       Short Term Goals:  -Patient will comply with a Video/Modified Barium Swallow study for more complete assessment of swallowing anatomy/physiology/aspiration risk and to assess efficacy of treatment techniques so as to best guide treatment plan    Speech Therapy Prognosis   Prognosis: good    Prognosis Considerations: age, medical status and prior medical history

## 2022-01-17 NOTE — ASSESSMENT & PLAN NOTE
· Evolving since admission  · Patient with fever and tachycardia  · Appears weak, lethargic, + cough  · Send Blood cultures, check UA, CXR negative - chest CT is negative for any acute pathology  CT abdomen is rather unremarkable   · COVID/flu/RSV negative  · procalcitonin 0 33-procalcitonin today is 0 4  · 1/2 sets of blood culture came back positive for Gram-positive cocci in [id number] Staph likely contaminant  Will discontinue  · Follow-up on final culture  May need Infectious Disease evaluation if it is a true infection  · Continue with the antibiotics and recheck procalcitonin vancomycin and follow-up on the final culture  · Urine strep antigen is positive-discontinue doxycycline and continue with ceftriaxone-patient has previous pencil in allergy and tolerated ceftriaxone  · Patient had a low-grade fever 100 1    Given her history of immunocompromise will get ID evaluation given persistent cough and low-grade fever  · Repeat chest x-ray was unremarkable as today for any acute infectious process

## 2022-01-18 ENCOUNTER — APPOINTMENT (INPATIENT)
Dept: RADIOLOGY | Facility: HOSPITAL | Age: 72
DRG: 595 | End: 2022-01-18
Attending: INTERNAL MEDICINE
Payer: COMMERCIAL

## 2022-01-18 PROCEDURE — 74230 X-RAY XM SWLNG FUNCJ C+: CPT

## 2022-01-18 PROCEDURE — 99232 SBSQ HOSP IP/OBS MODERATE 35: CPT | Performed by: INTERNAL MEDICINE

## 2022-01-18 PROCEDURE — 92611 MOTION FLUOROSCOPY/SWALLOW: CPT

## 2022-01-18 RX ADMIN — HYDROXYCHLOROQUINE SULFATE 200 MG: 200 TABLET, FILM COATED ORAL at 08:16

## 2022-01-18 RX ADMIN — APIXABAN 5 MG: 5 TABLET, FILM COATED ORAL at 08:15

## 2022-01-18 RX ADMIN — Medication 1000 UNITS: at 08:15

## 2022-01-18 RX ADMIN — MICONAZOLE NITRATE: 20 CREAM TOPICAL at 08:16

## 2022-01-18 RX ADMIN — METOPROLOL TARTRATE 25 MG: 25 TABLET, FILM COATED ORAL at 08:15

## 2022-01-18 RX ADMIN — ACETAMINOPHEN 650 MG: 325 TABLET, FILM COATED ORAL at 23:00

## 2022-01-18 RX ADMIN — HYDROCHLOROTHIAZIDE 12.5 MG: 12.5 TABLET ORAL at 08:15

## 2022-01-18 RX ADMIN — PREDNISONE 5 MG: 5 TABLET ORAL at 08:15

## 2022-01-18 RX ADMIN — APIXABAN 5 MG: 5 TABLET, FILM COATED ORAL at 17:58

## 2022-01-18 RX ADMIN — PANTOPRAZOLE SODIUM 40 MG: 40 TABLET, DELAYED RELEASE ORAL at 05:04

## 2022-01-18 RX ADMIN — MICONAZOLE NITRATE: 20 CREAM TOPICAL at 17:58

## 2022-01-18 RX ADMIN — GUAIFENESIN 600 MG: 600 TABLET, EXTENDED RELEASE ORAL at 08:15

## 2022-01-18 RX ADMIN — CLOTRIMAZOLE: 1 CREAM TOPICAL at 08:16

## 2022-01-18 RX ADMIN — TRIAMCINOLONE ACETONIDE: 1 OINTMENT TOPICAL at 17:58

## 2022-01-18 RX ADMIN — TRIAMCINOLONE ACETONIDE: 1 OINTMENT TOPICAL at 08:16

## 2022-01-18 RX ADMIN — CLOTRIMAZOLE: 1 CREAM TOPICAL at 17:58

## 2022-01-18 RX ADMIN — GUAIFENESIN 600 MG: 600 TABLET, EXTENDED RELEASE ORAL at 21:40

## 2022-01-18 RX ADMIN — HYDROXYCHLOROQUINE SULFATE 200 MG: 200 TABLET, FILM COATED ORAL at 17:58

## 2022-01-18 RX ADMIN — METOPROLOL TARTRATE 25 MG: 25 TABLET, FILM COATED ORAL at 21:40

## 2022-01-18 NOTE — PROGRESS NOTES
[address] 1950, 66 y o  female MRN: [id number]  Unit/Bed#: [facility] 531-01 Encounter: [id number]  Primary Care Provider: Nicolette Jaramillo DO   Date and time admitted to hospital: 1/10/2022 10:11 AM    * Sepsis [facility])  Assessment & Plan  · Evolving since admission  · Patient with fever and tachycardia  · Appears weak, lethargic, + cough  · Send Blood cultures, check UA, CXR negative - chest CT is negative for any acute pathology  CT abdomen is rather unremarkable   · COVID/flu/RSV negative  · procalcitonin 0 33-procalcitonin today is 0 4  · 1/2 sets of blood culture came back positive for Gram-positive cocci in moncpsfb-[id number]-[id number] Staph likely contaminant  Will discontinue  · Follow-up on final culture  May need Infectious Disease evaluation if it is a true infection  · Continue with the antibiotics and recheck procalcitonin vancomycin and follow-up on the final culture  · Urine strep antigen is positive-discontinue doxycycline and continue with ceftriaxone-patient has previous pencil in allergy and tolerated ceftriaxone  ·  ID evaluation appreciated  intermittent low grade fever noted   · Finished the course of antibiotics    New onset a-fib [facility])  Assessment & Plan  · On presentation was in afib RVR with -120s  · After IV lopressor x1, converted to NSR  · Seen by cardio -> cont lopressor 25 BID, switched to PO eliquis  · Patient will be discharged to rehab      Murmur, cardiac  Assessment & Plan  · Noticed by PCP, was ordered OP echo  · IP echo-no significant valvular abnormality  · Cardiology on board    Erythroderma  Assessment & Plan  · Patient noted to have diffuse rash on her skin  Skin is dry and peeling  · No definite diagnosis    Has been going on for the past 5 years started after being on IV antibiotics for a dental infection  · Family reported that the diagnosis of psoriasis was ruled out since the patient did not get better while on Humira for rheumatoid arthritis  · Dermatology evaluation appreciated  Erythroderma  Biopsy done -results pending  Outpatient follow-up with the dermatology  · Biopsy is back  Possible pustular dermatitis-  ·  s/p shave biopsy, follow results  Benign essential hypertension  Assessment & Plan  · Hold amlodipine as starting metoprolol for rate control  · Blood pressure appears to be stable on metoprolol    Rheumatoid arthritis (HCC)  Assessment & Plan  · Continue plaquenil, prednisone 5 mg QD          VTE Pharmacologic Prophylaxis:   Pharmacologic: Apixaban (Eliquis)  Mechanical VTE Prophylaxis in Place: Yes    Patient Centered Rounds: I have performed bedside rounds with nursing staff today  Discussions with Specialists or Other Care Team Provider: CM    Education and Discussions with Family / Patient: plan of care, patein    Time Spent for Care: 30 minutes  More than 50% of total time spent on counseling and coordination of care as described above  Current Length of Stay: 8 day(s)    Current Patient Status: Inpatient   Certification Statement: The patient will continue to require additional inpatient hospital stay due to not medically stable    Discharge Plan: when medically stabnle    Code Status: Level 1 - Full Code      Subjective:   Mild fever overngiht  No acute compalints  Had VBS doen today  Objective:     Vitals:   Temp (24hrs), Av 5 °F (38 1 °C), Min:100 5 °F (38 1 °C), Max:100 5 °F (38 1 °C)    Temp:  [100 5 °F (38 1 °C)] 100 5 °F (38 1 °C)  HR:  [90] 90  Resp:  [16-17] 16  BP: (134-149)/(62-65) 149/65  Body mass index is 42 87 kg/m²  Input and Output Summary (last 24 hours): Intake/Output Summary (Last 24 hours) at 2022 1730  Last data filed at 2022 0523  Gross per 24 hour   Intake 480 ml   Output 1385 ml   Net -905 ml       Physical Exam:     Physical Exam  Constitutional:       General: She is not in acute distress    Cardiovascular:      Rate and Rhythm: Normal rate and regular rhythm  Heart sounds: Murmur heard  Pulmonary:      Effort: No respiratory distress  Breath sounds: Normal breath sounds  No wheezing or rales  Abdominal:      General: Bowel sounds are normal  There is no distension  Palpations: Abdomen is soft  Tenderness: There is no abdominal tenderness  Musculoskeletal:         General: No swelling  Skin:     General: Skin is warm  Findings: Rash present  Neurological:      Mental Status: She is alert  Comments: Awake alert and communicative           Additional Data:     Labs:    Results from last 7 days   Lab Units 01/17/22  0627   WBC Thousand/uL 4 33   HEMOGLOBIN g/dL 11 8   HEMATOCRIT % 38 8   PLATELETS Thousands/uL 225     Results from last 7 days   Lab Units 01/17/22  0627 01/15/22  0510 01/15/22  0510   SODIUM mmol/L 140   < > 140   POTASSIUM mmol/L 3 5   < > 3 7   CHLORIDE mmol/L 108   < > 108   CO2 mmol/L 27   < > 24   BUN mg/dL 10   < > 9   CREATININE mg/dL 0 78   < > 0 93   ANION GAP mmol/L 5   < > 8   CALCIUM mg/dL 9 1   < > 8 9   ALBUMIN g/dL  --   --  2 1*   TOTAL BILIRUBIN mg/dL  --   --  0 39   ALK PHOS U/L  --   --  50   ALT U/L  --   --  27   AST U/L  --   --  28   GLUCOSE RANDOM mg/dL 82   < > 75    < > = values in this interval not displayed  Results from last 7 days   Lab Units 01/15/22  0510 01/14/22  1617 01/13/22  0536 01/12/22  0634   PROCALCITONIN ng/ml 0 23 0 22 0 26* 0 42*           * I Have Reviewed All Lab Data Listed Above  * Additional Pertinent Lab Tests Reviewed: All Labs Within Last 24 Hours Reviewed    Imaging:    FL barium swallow video w speech   Final Result by LASHAE FERNANDEZ (01/18 2101)      XR chest portable   Final Result by Paulette Crain MD (01/14 3889)      Mild increased pulmonary vascular congestion                    Workstation performed: [id number]         CT abdomen pelvis wo contrast   Final Result by King Gipson Milana Frausto MD (01/12 6231)   1  Right upper renal pole punctate nonobstructive calculus without further urinary calculi  2   No acute inflammatory or infectious process  Workstation performed: [id number]         CT head wo contrast   Final Result by Gypsy Masters MD (01/11 7343)      No acute intracranial abnormality  Paranasal sinus disease, as described above  Workstation performed: [id number]         CT chest wo contrast   Final Result by Cehrrie Mitchell MD (01/11 3754)      No source for sepsis identified in the chest       Fatty liver  Workstation performed: [id number]         X-ray chest 1 view portable   ED Interpretation by Lui Berry DO (01/10 1110)   Chest x-ray interpreted me shows no acute cardiopulmonary disease, No change from August 23, 2016      Final Result by Yenifer Keys MD (01/10 1157)      No acute cardiopulmonary disease  Findings concur with the preliminary report by the referring clinician already in PACS and/or our electronic record EPIC  Workstation performed: [id number]             Recent Cultures (last 7 days):     Results from last 7 days   Lab Units 01/14/22  1616 01/14/22  1615 01/12/22  0634   BLOOD CULTURE  No Growth at 72 hrs  No Growth at 72 hrs  No Growth After 5 Days  No Growth After 5 Days         Last 24 Hours Medication List:   Current Facility-Administered Medications   Medication Dose Route Frequency Provider Last Rate    acetaminophen  650 mg Oral Q4H PRN Agnes Meredith MD      albuterol  2 puff Inhalation Q4H PRN Hermila Marquez MD      ammonium lactate   Topical BID PRN Hermila Marquez MD      apixaban  5 mg Oral BID Kasey Layton,       benzonatate  100 mg Oral TID PRN Hermila Marquez MD      cholecalciferol  1,000 Units Oral Daily Agnes Meredith MD      clotrimazole   Topical BID Agnes Meredith MD      diphenhydrAMINE  25 mg Oral Q6H PRN MD Abril Rubio guaiFENesin  600 mg Oral Q12H [address] Bella Badillo MD      hydrochlorothiazide  12 5 mg Oral Daily Bella Badillo MD      hydrocodone-chlorpheniramine polistirex  5 mL Oral Q12H PRN Bella Badillo MD      hydroxychloroquine  200 mg Oral BID Mariely Ramos MD      metoprolol  5 mg Intravenous Q6H PRN Mariely Ramos MD      metoprolol tartrate  25 mg Oral Q12H [address] [place] [address]      MAGALY ANTIFUNGAL   Topical BID Bella Badillo MD      pantoprazole  40 mg Oral Early Morning Mariely Ramos MD      predniSONE  5 mg Oral Daily Mariely Ramos MD      sodium chloride (PF)  3 mL Intravenous Q1H PRN Mariely Ramos MD      triamcinolone   Topical BID Bella Badillo MD          Today, Patient Was Seen By: Valdemar Garibay MD    ** Please Note: Dictation voice to text software may have been used in the creation of this document   **

## 2022-01-18 NOTE — ASSESSMENT & PLAN NOTE
· On presentation was in afib RVR with -120s  · After IV lopressor x1, converted to NSR  · Seen by cardio -> cont lopressor 25 BID, switched to PO eliquis  · Patient will be discharged to rehab

## 2022-01-18 NOTE — ASSESSMENT & PLAN NOTE
· Evolving since admission  · Patient with fever and tachycardia  · Appears weak, lethargic, + cough  · Send Blood cultures, check UA, CXR negative - chest CT is negative for any acute pathology  CT abdomen is rather unremarkable   · COVID/flu/RSV negative  · procalcitonin 0 33-procalcitonin today is 0 4  · 1/2 sets of blood culture came back positive for Gram-positive cocci in smrtrnev-asdfuhfbd-qaqtzycv Staph likely contaminant  Will discontinue  · Follow-up on final culture  May need Infectious Disease evaluation if it is a true infection  · Continue with the antibiotics and recheck procalcitonin vancomycin and follow-up on the final culture  · Urine strep antigen is positive-discontinue doxycycline and continue with ceftriaxone-patient has previous pencil in allergy and tolerated ceftriaxone  · Patient had a low-grade fever 100 1  Given her history of immunocompromise will get ID evaluation given persistent cough and low-grade fever    ID evaluation appreciated  · Finished the course of antibiotics

## 2022-01-18 NOTE — ASSESSMENT & PLAN NOTE
· Patient noted to have diffuse rash on her skin  Skin is dry and peeling  · No definite diagnosis  Has been going on for the past 5 years started after being on IV antibiotics for a dental infection  · Family reported that the diagnosis of psoriasis was ruled out since the patient did not get better while on Humira for rheumatoid arthritis  · Dermatology evaluation appreciated  Erythroderma  Biopsy done -results pending  Outpatient follow-up with the dermatology  · Biopsy is back  Possible pustular dermatitis-  ·  s/p shave biopsy, follow results

## 2022-01-18 NOTE — ASSESSMENT & PLAN NOTE
· Evolving since admission  · Patient with fever and tachycardia  · Appears weak, lethargic, + cough  · Send Blood cultures, check UA, CXR negative - chest CT is negative for any acute pathology  CT abdomen is rather unremarkable   · COVID/flu/RSV negative  · procalcitonin 0 33-procalcitonin today is 0 4  · 1/2 sets of blood culture came back positive for Gram-positive cocci in couqmhfn-nlufplaky-teloqyqq Staph likely contaminant  Will discontinue  · Follow-up on final culture  May need Infectious Disease evaluation if it is a true infection  · Continue with the antibiotics and recheck procalcitonin vancomycin and follow-up on the final culture  · Urine strep antigen is positive-discontinue doxycycline and continue with ceftriaxone-patient has previous pencil in allergy and tolerated ceftriaxone  ·  ID evaluation appreciated   intermittent low grade fever noted   · Finished the course of antibiotics

## 2022-01-18 NOTE — PHYSICAL THERAPY NOTE
Physical Therapy Cancellation Note       01/18/22 1054   PT Last Visit   PT Visit Date 01/18/22   Note Type   Note Type Cancelled Session   Cancel Reasons Other     Pt chart reviewed  RN cleared pt to be seen by PT  PT attempted to see pt, however, pt currently refusing reporting fatigue  Provided pt with education regarding importance of mobility  PT to continue to follow and see pt as appropriate and able      Donte Lopez, PT, DPT

## 2022-01-18 NOTE — CASE MANAGEMENT
Case Management Discharge Planning Note    Patient name Heather Speaker  Location [facility] 531/[facility] 593-90 MRN [id number]  : 1950 Date 2022       Current Admission Date: 1/10/2022  Current Admission Diagnosis:Sepsis [facility])   Patient Active Problem List    Diagnosis Date Noted    New onset a-fib ([facility] ) 01/10/2022    [facility] 01/10/2022    Continuous opioid dependence ([facility] ) 2021    Hyperparathyroidism ([facility] ) 2021    Medicare annual wellness visit, subsequent 2021    Murmur, cardiac 2021    Morbid obesity with BMI of 40 0-44 9, adult ([facility] ) 2019    BPPV (benign paroxysmal positional vertigo) 2018    Seasonal allergic rhinitis due to pollen 2018    Erythroderma 10/27/2017    Osteoporosis 2016    Rheumatoid arthritis ([facility] ) 2016    GERD (gastroesophageal reflux disease) 2016    Sarcoid 2016    Morbid obesity ([facility] ) 2016    Vitamin D deficiency 2015    Benign essential hypertension 2014    Lumbar radiculopathy 2014      LOS (days): 8  Geometric Mean LOS (GMLOS) (days): 3 50  Days to GMLOS:-4 4     OBJECTIVE:  Risk of Unplanned Readmission Score: 17         Current admission status: Inpatient   Preferred Pharmacy:   [facility] [address] [place] [address] Justyna Lopez [address]  Phone: [phone] Fax: [phone]    Primary Care Provider: Julianne Torres DO    Primary Insurance: [employer] [facility] REP  Secondary Insurance:     DISCHARGE DETAILS:    Discharge planning discussed with[de-identified] Pt  Freedom of Choice: Yes  Comments - Freedom of Choice: Cm provided pt with SNF list  Explained facilities are filtered by pt's zip code   Pt will review with her daughter and cm to f/u for choices          Treatment Team Recommendation: Short Term Rehab  Discharge Destination Plan[de-identified] Short Term Rehab

## 2022-01-18 NOTE — PLAN OF CARE
Problem: MOBILITY - ADULT  Goal: Maintain or return to baseline ADL function  Description: INTERVENTIONS:  -  Assess patient's ability to carry out ADLs; assess patient's baseline for ADL function and identify physical deficits which impact ability to perform ADLs (bathing, care of mouth/teeth, toileting, grooming, dressing, etc )  - Assess/evaluate cause of self-care deficits   - Assess range of motion  - Assess patient's mobility; develop plan if impaired  - Assess patient's need for assistive devices and provide as appropriate  - Encourage maximum independence but intervene and supervise when necessary  - Involve family in performance of ADLs  - Assess for home care needs following discharge   - Consider OT consult to assist with ADL evaluation and planning for discharge  - Provide patient education as appropriate  Outcome: Progressing  Goal: Maintains/Returns to pre admission functional level  Description: INTERVENTIONS:  - Perform BMAT or MOVE assessment daily    - Set and communicate daily mobility goal to care team and patient/family/caregiver     - Collaborate with rehabilitation services on mobility goals if consulted  - Out of bed for toileting  - Record patient progress and toleration of activity level   Outcome: Progressing     Problem: Prexisting or High Potential for Compromised Skin Integrity  Goal: Skin integrity is maintained or improved  Description: INTERVENTIONS:  - Identify patients at risk for skin breakdown  - Assess and monitor skin integrity  - Assess and monitor nutrition and hydration status  - Monitor labs   - Assess for incontinence   - Turn and reposition patient  - Assist with mobility/ambulation  - Relieve pressure over bony prominences  - Avoid friction and shearing  - Provide appropriate hygiene as needed including keeping skin clean and dry  - Evaluate need for skin moisturizer/barrier cream  - Collaborate with interdisciplinary team   - Patient/family teaching  - Consider wound care consult   Outcome: Progressing     Problem: Potential for Falls  Goal: Patient will remain free of falls  Description: INTERVENTIONS:  - Educate patient/family on patient safety including physical limitations  - Instruct patient to call for assistance with activity   - Consult OT/PT to assist with strengthening/mobility   - Keep Call bell within reach  - Keep bed low and locked with side rails adjusted as appropriate  - Keep care items and personal belongings within reach  - Initiate and maintain comfort rounds  - Make Fall Risk Sign visible to staff  - Apply yellow socks and bracelet for high fall risk patients  - Consider moving patient to room near nurses station  Outcome: Progressing     Problem: Nutrition/Hydration-ADULT  Goal: Nutrient/Hydration intake appropriate for improving, restoring or maintaining nutritional needs  Description: Monitor and assess patient's nutrition/hydration status for malnutrition  Collaborate with interdisciplinary team and initiate plan and interventions as ordered  Monitor patient's weight and dietary intake as ordered or per policy  Utilize nutrition screening tool and intervene as necessary  Determine patient's food preferences and provide high-protein, high-caloric foods as appropriate       INTERVENTIONS:  - Monitor oral intake, urinary output, labs, and treatment plans  - Assess nutrition and hydration status and recommend course of action  - Evaluate amount of meals eaten  - Assist patient with eating if necessary   - Allow adequate time for meals  - Recommend/ encourage appropriate diets, oral nutritional supplements, and vitamin/mineral supplements  - Order, calculate, and assess calorie counts as needed  - Recommend, monitor, and adjust tube feedings and TPN/PPN based on assessed needs  - Assess need for intravenous fluids  - Provide specific nutrition/hydration education as appropriate  - Include patient/family/caregiver in decisions related to nutrition  Outcome: Progressing     Problem: PAIN - ADULT  Goal: Verbalizes/displays adequate comfort level or baseline comfort level  Description: Interventions:  - Encourage patient to monitor pain and request assistance  - Assess pain using appropriate pain scale  - Administer analgesics based on type and severity of pain and evaluate response  - Implement non-pharmacological measures as appropriate and evaluate response  - Consider cultural and social influences on pain and pain management  - Notify physician/advanced practitioner if interventions unsuccessful or patient reports new pain  Outcome: Progressing     Problem: INFECTION - ADULT  Goal: Absence or prevention of progression during hospitalization  Description: INTERVENTIONS:  - Assess and monitor for signs and symptoms of infection  - Monitor lab/diagnostic results  - Monitor all insertion sites, i e  indwelling lines, tubes, and drains  - Monitor endotracheal if appropriate and nasal secretions for changes in amount and color  - Loop appropriate cooling/warming therapies per order  - Administer medications as ordered  - Instruct and encourage patient and family to use good hand hygiene technique  - Identify and instruct in appropriate isolation precautions for identified infection/condition  Outcome: Progressing     Problem: SAFETY ADULT  Goal: Maintain or return to baseline ADL function  Description: INTERVENTIONS:  -  Assess patient's ability to carry out ADLs; assess patient's baseline for ADL function and identify physical deficits which impact ability to perform ADLs (bathing, care of mouth/teeth, toileting, grooming, dressing, etc )  - Assess/evaluate cause of self-care deficits   - Assess range of motion  - Assess patient's mobility; develop plan if impaired  - Assess patient's need for assistive devices and provide as appropriate  - Encourage maximum independence but intervene and supervise when necessary  - Involve family in performance of ADLs  - Assess for home care needs following discharge   - Consider OT consult to assist with ADL evaluation and planning for discharge  - Provide patient education as appropriate  Outcome: Progressing  Goal: Maintains/Returns to pre admission functional level  Description: INTERVENTIONS:  - Perform BMAT or MOVE assessment daily    - Set and communicate daily mobility goal to care team and patient/family/caregiver     - Collaborate with rehabilitation services on mobility goals if consulted  - Out of bed for toileting  - Record patient progress and toleration of activity level   Outcome: Progressing  Goal: Patient will remain free of falls  Description: INTERVENTIONS:  - Educate patient/family on patient safety including physical limitations  - Instruct patient to call for assistance with activity   - Consult OT/PT to assist with strengthening/mobility   - Keep Call bell within reach  - Keep bed low and locked with side rails adjusted as appropriate  - Keep care items and personal belongings within reach  - Initiate and maintain comfort rounds  - Make Fall Risk Sign visible to staff  - Apply yellow socks and bracelet for high fall risk patients  - Consider moving patient to room near nurses station  Outcome: Progressing     Problem: DISCHARGE PLANNING  Goal: Discharge to home or other facility with appropriate resources  Description: INTERVENTIONS:  - Identify barriers to discharge w/patient and caregiver  - Arrange for needed discharge resources and transportation as appropriate  - Identify discharge learning needs (meds, wound care, etc )  - Arrange for interpretive services to assist at discharge as needed  - Refer to Case Management Department for coordinating discharge planning if the patient needs post-hospital services based on physician/advanced practitioner order or complex needs related to functional status, cognitive ability, or social support system  Outcome: Progressing     Problem: Knowledge Deficit  Goal: Patient/family/caregiver demonstrates understanding of disease process, treatment plan, medications, and discharge instructions  Description: Complete learning assessment and assess knowledge base    Interventions:  - Provide teaching at level of understanding  - Provide teaching via preferred learning methods  Outcome: Progressing

## 2022-01-18 NOTE — PROCEDURES
Video Swallow Study      Patient Name: Jeanine Hunter  AKVIX'V Date: 2022        Past Medical History  Past Medical History:   Diagnosis Date    Abnormal thyroid function test     last assessed: 2015     Arthritis     Caries     last assessed: 2016     Edema of right lower extremity     last assessed: 2015     GERD (gastroesophageal reflux disease)     Hypertension     Sarcoid         Past Surgical History  Past Surgical History:   Procedure Laterality Date     SECTION      MULTIPLE TOOTH EXTRACTIONS N/A 2016    Procedure: Surgical extraction of teeth 2, 18, 19, 30, 31; incision and drainage of left subperiosteal abscess ;  Surgeon: Jessica Montgomery DMD;  Location: [facility];  Service:          Video Barium Swallow Study    Summary:  Images are on PACS for review  Pt presents w/ mild oropharyngeal dysphagia venkata by mildly prolonged mastication and oral manipulation, delayed swallow initiation, and min-mild pharyngeal residue  Pt w/ slow transfer of material  All material spills to the valleculae prior to delayed hyo-laryngeal excursion  Min-mild vallecular retention noted after the swallow w/ subsequent pooling in the pyriforms  No penetration or aspiration on today's study  Brief screening of the esophagus revealed slow motility and retention  Recommendations:  Diet: Regular   Liquids: Thin   Meds: As tolerated  Strategies: Mult  Swallows   Upright position  F/u ST tx: Yes, brief to review VBS findings and recommendations   Therapy Prognosis: Good  Prognosis considerations: Age, current medical, past medical, cognitive status   Aspiration Precautions   Reflux Precautions  Consider consult with: GI   Results reviewed with: pt, nursing  Aspiration precautions posted  If a dedicated assessment of the esophagus is desired, consider esophagram/barium swallow or EGD      Patient's goal:  None stated     Goals:  Pt will tolerate least restrictive diet w/out s/s aspiration or oral/pharyngeal difficulties  Previous VBS:  No    Current Diet:  Regular w/ thin      Premorbid diet:  Regular w/ thin     Dentition:  Adequate    O2 requirement:  RA    Oral mech:  Strength and ROM: wfl     Vocal Quality/Speech:  Mild hoarse    Cognitive status:  Lethargic     Consistencies administered: Barium laden applesauce, banana, chicken, hard solid,, thin liquids, 13mm barium pill attempted though pt unable to successfully transfer  Liquids were administered by cup and straw  Pt was seated laterally at 90 degrees       Oral stage:  Lip closure: wfl   Mastication: mild prolonged   Bolus formation: mild prolonged   Bolus control: good   Transfer: wfl   Residue: min     Pharyngeal stage:  Swallow promptness: mild delay    Spill to valleculae: w/ most material   Spill to pyriforms: no    Epiglottic inversion: complete   Laryngeal excursion: wfl   Pharyngeal constriction: wfl   Vallecular retention: min-mild   Pyriform retention: mild pooling after the swallow   PPW coating: no   Osteophytes: no   CP prominence: not visualized though view impacted 2/2 body habitus   Retropulsion from prominence: no   Transient penetration: no    Epiglottic undercoat: no   Penetration: no   Aspiration: no   Strategies: secondary swallow clears retention   Response to aspiration: NA     Screening of Esophageal stage:  Dysmotility: -   Slow motility: +    Retropulsion: -   Tertiary contractions: -   Reflux: -   Retention: -   Stricture:  -   LES: -

## 2022-01-18 NOTE — PROGRESS NOTES
[address] 1950, 66 y o  female MRN: [id number]  Unit/Bed#: [facility] 531-01 Encounter: [id number]  Primary Care Provider: Ale Cantu DO   Date and time admitted to hospital: 1/10/2022 10:11 AM    * Sepsis [facility])  Assessment & Plan  · Evolving since admission  · Patient with fever and tachycardia  · Appears weak, lethargic, + cough  · Send Blood cultures, check UA, CXR negative - chest CT is negative for any acute pathology  CT abdomen is rather unremarkable   · COVID/flu/RSV negative  · procalcitonin 0 33-procalcitonin today is 0 4  · 1/2 sets of blood culture came back positive for Gram-positive cocci in tqpkhexf-lknqmcarg-qlthiaol Staph likely contaminant  Will discontinue  · Follow-up on final culture  May need Infectious Disease evaluation if it is a true infection  · Continue with the antibiotics and recheck procalcitonin vancomycin and follow-up on the final culture  · Urine strep antigen is positive-discontinue doxycycline and continue with ceftriaxone-patient has previous pencil in allergy and tolerated ceftriaxone  · Patient had a low-grade fever 100 1  Given her history of immunocompromise will get ID evaluation given persistent cough and low-grade fever  ID evaluation appreciated  · Finished the course of antibiotics    New onset a-fib [facility])  Assessment & Plan  · On presentation was in afib RVR with -120s  · After IV lopressor x1, converted to NSR  · Seen by cardio -> cont lopressor 25 BID, switched to PO eliquis  · Patient will be discharged to rehab  · Patient remained in sinus rhythm    Murmur, cardiac  Assessment & Plan  · Noticed by PCP, was ordered OP echo  · IP echo-no significant valvular abnormality  · Cardiology on board    Erythroderma  Assessment & Plan  · Patient noted to have diffuse rash on her skin  Skin is dry and peeling  · No definite diagnosis    Has been going on for the past 5 years started after being on IV antibiotics for a dental infection  · Family reported that the diagnosis of psoriasis was ruled out since the patient did not get better while on Humira for rheumatoid arthritis  · Dermatology evaluation appreciated  Erythroderma  Biopsy done -results pending  Outpatient follow-up with the dermatology  · Biopsy is back  Possible pustular dermatitis-  ·  s/p shave biopsy today    Benign essential hypertension  Assessment & Plan  · Hold amlodipine as starting metoprolol for rate control  · Blood pressure appears to be stable on metoprolol    Rheumatoid arthritis (HCC)  Assessment & Plan  · Continue plaquenil, prednisone 5 mg QD      VTE Pharmacologic Prophylaxis:   Pharmacologic: Apixaban (Eliquis)  Mechanical VTE Prophylaxis in Place: Yes    Patient Centered Rounds: I have performed bedside rounds with nursing staff today  Discussions with Specialists or Other Care Team Provider:     Education and Discussions with Family / Samreen Nguyen  Time Spent for Care: 30 minutes  More than 50% of total time spent on counseling and coordination of care as described above  Current Length of Stay: 7 day(s)    Current Patient Status: Inpatient   Certification Statement: The patient will continue to require additional inpatient hospital stay due to pending rehab    Discharge Plan:     Code Status: Level 1 - Full Code      Subjective:   Patient seen and examined    Objective:     Vitals:   Temp (24hrs), Av 7 °F (37 1 °C), Min:97 6 °F (36 4 °C), Max:99 3 °F (37 4 °C)    Temp:  [97 6 °F (36 4 °C)-99 3 °F (37 4 °C)] 99 2 °F (37 3 °C)  HR:  [76-84] 82  Resp:  [17] 17  BP: (109-125)/(47-66) 125/62  SpO2:  [94 %-97 %] 96 %  Body mass index is 44 54 kg/m²  Input and Output Summary (last 24 hours):        Intake/Output Summary (Last 24 hours) at 2022 1916  Last data filed at 2022 1701  Gross per 24 hour   Intake --   Output 1500 ml   Net -1500 ml       Physical Exam:     Physical Exam  Constitutional: General: She is not in acute distress  HENT:      Head: Normocephalic and atraumatic  Nose: Nose normal    Eyes:      General: No scleral icterus  Cardiovascular:      Rate and Rhythm: Normal rate and regular rhythm  Pulses: Normal pulses  Pulmonary:      Effort: Pulmonary effort is normal       Comments: decreased breath sounds bilateral   Abdominal:      General: Abdomen is flat  There is no distension  Musculoskeletal:         General: Normal range of motion  Skin:     General: Skin is warm  Findings: Rash present  No lesion  Neurological:      General: No focal deficit present  Mental Status: She is alert  Additional Data:     Labs:    Results from last 7 days   Lab Units 01/17/22  0627 01/12/22  0634 01/11/22  0506   WBC Thousand/uL 4 33   < > 8 95   HEMOGLOBIN g/dL 11 8   < > 14 2   HEMATOCRIT % 38 8   < > 45 5   PLATELETS Thousands/uL 225   < > 194   NEUTROS PCT %  --   --  79*   LYMPHS PCT %  --   --  11*   MONOS PCT %  --   --  8   EOS PCT %  --   --  2    < > = values in this interval not displayed  Results from last 7 days   Lab Units 01/17/22  0627 01/15/22  0510 01/15/22  0510   POTASSIUM mmol/L 3 5   < > 3 7   CHLORIDE mmol/L 108   < > 108   CO2 mmol/L 27   < > 24   BUN mg/dL 10   < > 9   CREATININE mg/dL 0 78   < > 0 93   CALCIUM mg/dL 9 1   < > 8 9   ALK PHOS U/L  --   --  50   ALT U/L  --   --  27   AST U/L  --   --  28    < > = values in this interval not displayed  * I Have Reviewed All Lab Data Listed Above  * Additional Pertinent Lab Tests Reviewed: She 64 Admission Reviewed    Imaging:    Imaging Reports Reviewed Today Include:   Imaging Personally Reviewed by Myself Includes:      Recent Cultures (last 7 days):     Results from last 7 days   Lab Units 01/14/22  1616 01/14/22  1615 01/12/22  0634 01/10/22  2025   BLOOD CULTURE  No Growth at 72 hrs  No Growth at 72 hrs  No Growth After 5 Days    No Growth After 5 Days  Staphylococcus coagulase negative*   GRAM STAIN RESULT   --   --   --  Gram positive cocci in clusters*       Last 24 Hours Medication List:   Current Facility-Administered Medications   Medication Dose Route Frequency Provider Last Rate    acetaminophen  650 mg Oral Q4H PRN Noé Bee MD      albuterol  2 puff Inhalation Q4H PRN Jessica Harrington MD      ammonium lactate   Topical BID PRN Jessica Harrington MD      apixaban  5 mg Oral BID Barbara Colindres DO      benzonatate  100 mg Oral TID PRN Jessica Harrington MD      cholecalciferol  1,000 Units Oral Daily Noé Bee MD      clotrimazole   Topical BID Noé Bee MD      diphenhydrAMINE  25 mg Oral Q6H PRN Jessica Harrington MD      guaiFENesin  600 mg Oral Q12H [address] Jessica Harrington MD      hydrochlorothiazide  12 5 mg Oral Daily Jessica Harrington MD      hydrocodone-chlorpheniramine polistirex  5 mL Oral Q12H PRN Jessica Harrington MD      hydroxychloroquine  200 mg Oral BID Noé Bee MD      metoprolol  5 mg Intravenous Q6H PRN Noé Bee MD      metoprolol tartrate  25 mg Oral Q12H [address] Barbara Colindres DO      MAGALY ANTIFUNGAL   Topical BID Jessica Harrington MD      pantoprazole  40 mg Oral Early Morning Noé Bee MD      predniSONE  5 mg Oral Daily Noé Bee MD      sodium chloride (PF)  3 mL Intravenous Q1H PRN Noé Bee MD      triamcinolone   Topical BID Jessica Harrington MD          Today, Patient Was Seen By: Jessica Harrington MD    ** Please Note: Dictation voice to text software may have been used in the creation of this document   **

## 2022-01-18 NOTE — OCCUPATIONAL THERAPY NOTE
Occupational Therapy Cx        Patient Name: Alyson Jeter  WFHJO'J Date: 1/18/2022 01/18/22 1059   OT Last Visit   OT Visit Date 01/18/22   Note Type   Note Type Cancelled Session   Cancel Reasons Other  (refusal)     Attempted to see pt this AM, however pt refusing, stating fatigue/needing to rest  Educated pt on importance of engaging in therapy session to make strides toward recovery, however pt continues to refuse  Will continue to follow as able         David Score, MOT, OTR/L

## 2022-01-18 NOTE — ASSESSMENT & PLAN NOTE
· On presentation was in afib RVR with -120s  · After IV lopressor x1, converted to NSR  · Seen by cardio -> cont lopressor 25 BID, switched to PO eliquis  · Patient will be discharged to rehab  · Patient remained in sinus rhythm

## 2022-01-19 PROBLEM — U07.1 COVID-19: Status: ACTIVE | Noted: 2022-01-19

## 2022-01-19 LAB
ANION GAP SERPL CALCULATED.3IONS-SCNC: 5 MMOL/L (ref 4–13)
BACTERIA BLD CULT: NORMAL
BACTERIA BLD CULT: NORMAL
BASOPHILS # BLD AUTO: 0.02 THOUSANDS/ΜL (ref 0–0.1)
BASOPHILS NFR BLD AUTO: 1 % (ref 0–1)
BUN SERPL-MCNC: 11 MG/DL (ref 5–25)
CALCIUM SERPL-MCNC: 8.9 MG/DL (ref 8.3–10.1)
CHLORIDE SERPL-SCNC: 105 MMOL/L (ref 100–108)
CO2 SERPL-SCNC: 27 MMOL/L (ref 21–32)
CREAT SERPL-MCNC: 0.79 MG/DL (ref 0.6–1.3)
EOSINOPHIL # BLD AUTO: 0.04 THOUSAND/ΜL (ref 0–0.61)
EOSINOPHIL NFR BLD AUTO: 1 % (ref 0–6)
ERYTHROCYTE [DISTWIDTH] IN BLOOD BY AUTOMATED COUNT: 14.8 % (ref 11.6–15.1)
FLUAV RNA RESP QL NAA+PROBE: NEGATIVE
FLUBV RNA RESP QL NAA+PROBE: NEGATIVE
GFR SERPL CREATININE-BSD FRML MDRD: 74 ML/MIN/1.73SQ M
GLUCOSE SERPL-MCNC: 86 MG/DL (ref 65–140)
HCT VFR BLD AUTO: 39.1 % (ref 34.8–46.1)
HGB BLD-MCNC: 12.5 G/DL (ref 11.5–15.4)
IMM GRANULOCYTES # BLD AUTO: 0.02 THOUSAND/UL (ref 0–0.2)
IMM GRANULOCYTES NFR BLD AUTO: 1 % (ref 0–2)
LYMPHOCYTES # BLD AUTO: 0.79 THOUSANDS/ΜL (ref 0.6–4.47)
LYMPHOCYTES NFR BLD AUTO: 23 % (ref 14–44)
MCH RBC QN AUTO: 27.6 PG (ref 26.8–34.3)
MCHC RBC AUTO-ENTMCNC: 32 G/DL (ref 31.4–37.4)
MCV RBC AUTO: 86 FL (ref 82–98)
MONOCYTES # BLD AUTO: 0.59 THOUSAND/ΜL (ref 0.17–1.22)
MONOCYTES NFR BLD AUTO: 17 % (ref 4–12)
NEUTROPHILS # BLD AUTO: 2.04 THOUSANDS/ΜL (ref 1.85–7.62)
NEUTS SEG NFR BLD AUTO: 57 % (ref 43–75)
NRBC BLD AUTO-RTO: 0 /100 WBCS
PLATELET # BLD AUTO: 224 THOUSANDS/UL (ref 149–390)
PMV BLD AUTO: 11.3 FL (ref 8.9–12.7)
POTASSIUM SERPL-SCNC: 3.5 MMOL/L (ref 3.5–5.3)
PROCALCITONIN SERPL-MCNC: 0.11 NG/ML
RBC # BLD AUTO: 4.53 MILLION/UL (ref 3.81–5.12)
RSV RNA RESP QL NAA+PROBE: NEGATIVE
SARS-COV-2 RNA RESP QL NAA+PROBE: POSITIVE
SODIUM SERPL-SCNC: 137 MMOL/L (ref 136–145)
WBC # BLD AUTO: 3.5 THOUSAND/UL (ref 4.31–10.16)

## 2022-01-19 PROCEDURE — 99232 SBSQ HOSP IP/OBS MODERATE 35: CPT | Performed by: INTERNAL MEDICINE

## 2022-01-19 PROCEDURE — 85025 COMPLETE CBC W/AUTO DIFF WBC: CPT | Performed by: INTERNAL MEDICINE

## 2022-01-19 PROCEDURE — 0241U HB NFCT DS VIR RESP RNA 4 TRGT: CPT | Performed by: INTERNAL MEDICINE

## 2022-01-19 PROCEDURE — 99231 SBSQ HOSP IP/OBS SF/LOW 25: CPT | Performed by: STUDENT IN AN ORGANIZED HEALTH CARE EDUCATION/TRAINING PROGRAM

## 2022-01-19 PROCEDURE — 84145 PROCALCITONIN (PCT): CPT | Performed by: INTERNAL MEDICINE

## 2022-01-19 PROCEDURE — 80048 BASIC METABOLIC PNL TOTAL CA: CPT | Performed by: INTERNAL MEDICINE

## 2022-01-19 PROCEDURE — XW033E5 INTRODUCTION OF REMDESIVIR ANTI-INFECTIVE INTO PERIPHERAL VEIN, PERCUTANEOUS APPROACH, NEW TECHNOLOGY GROUP 5: ICD-10-PCS | Performed by: INTERNAL MEDICINE

## 2022-01-19 RX ORDER — LIDOCAINE 50 MG/G
1 PATCH TOPICAL DAILY
Status: DISCONTINUED | OUTPATIENT
Start: 2022-01-19 | End: 2022-01-26 | Stop reason: HOSPADM

## 2022-01-19 RX ADMIN — REMDESIVIR 200 MG: 100 INJECTION, POWDER, LYOPHILIZED, FOR SOLUTION INTRAVENOUS at 15:37

## 2022-01-19 RX ADMIN — GUAIFENESIN 600 MG: 600 TABLET, EXTENDED RELEASE ORAL at 20:45

## 2022-01-19 RX ADMIN — APIXABAN 5 MG: 5 TABLET, FILM COATED ORAL at 08:00

## 2022-01-19 RX ADMIN — LIDOCAINE 5% 1 PATCH: 700 PATCH TOPICAL at 08:03

## 2022-01-19 RX ADMIN — CLOTRIMAZOLE: 1 CREAM TOPICAL at 17:32

## 2022-01-19 RX ADMIN — PANTOPRAZOLE SODIUM 40 MG: 40 TABLET, DELAYED RELEASE ORAL at 06:13

## 2022-01-19 RX ADMIN — METOPROLOL TARTRATE 25 MG: 25 TABLET, FILM COATED ORAL at 20:45

## 2022-01-19 RX ADMIN — TRIAMCINOLONE ACETONIDE: 1 OINTMENT TOPICAL at 17:32

## 2022-01-19 RX ADMIN — Medication 1000 UNITS: at 08:00

## 2022-01-19 RX ADMIN — TRIAMCINOLONE ACETONIDE: 1 OINTMENT TOPICAL at 08:01

## 2022-01-19 RX ADMIN — HYDROCHLOROTHIAZIDE 12.5 MG: 12.5 TABLET ORAL at 08:00

## 2022-01-19 RX ADMIN — HYDROXYCHLOROQUINE SULFATE 200 MG: 200 TABLET, FILM COATED ORAL at 17:19

## 2022-01-19 RX ADMIN — METOPROLOL TARTRATE 25 MG: 25 TABLET, FILM COATED ORAL at 08:00

## 2022-01-19 RX ADMIN — APIXABAN 5 MG: 5 TABLET, FILM COATED ORAL at 17:17

## 2022-01-19 RX ADMIN — HYDROXYCHLOROQUINE SULFATE 200 MG: 200 TABLET, FILM COATED ORAL at 08:00

## 2022-01-19 RX ADMIN — CLOTRIMAZOLE: 1 CREAM TOPICAL at 08:01

## 2022-01-19 RX ADMIN — MICONAZOLE NITRATE: 20 CREAM TOPICAL at 08:01

## 2022-01-19 RX ADMIN — MICONAZOLE NITRATE: 20 CREAM TOPICAL at 17:32

## 2022-01-19 RX ADMIN — PREDNISONE 5 MG: 5 TABLET ORAL at 08:00

## 2022-01-19 RX ADMIN — GUAIFENESIN 600 MG: 600 TABLET, EXTENDED RELEASE ORAL at 08:00

## 2022-01-19 NOTE — PROGRESS NOTES
[address] 1950, 66 y o  female MRN: [id number]  Unit/Bed#: [facility] 531-01 Encounter: [id number]  Primary Care Provider: Nanette Spain DO   Date and time admitted to hospital: 1/10/2022 10:11 AM    * Sepsis [facility])  Assessment & Plan  · Evolving since admission, Patient with fever and tachycardia  · Appears weak, lethargic, + cough  · Send Blood cultures, check UA, CXR negative - chest CT is negative for any acute pathology  CT abdomen is rather unremarkable   · COVID/flu/RSV negative  · procalcitonin now normal   · 1/2 sets of blood culture came back positive for Gram-positive cocci in ftkwxgez-upmezvbfo-hmazgpcn Staph likely contaminant  · Urine strep antigen is positive-completed antibiotic course  ·  ID evaluation appreciated  · Due to recurrent low-grade fever COVID exposure with her daughter being positive and a recent visit by Daughter , COVID swab was checked on 01/19 which came back positive  · Now on remdesivir given immunocompromised state  Fortunately she is on room air, will hold off on steroids and baricitinib  · Continue Eliquis for anticoagulation  COVID-19  Assessment & Plan  · Tested positive for COVID on 01/19  Her daughter visited her recently and daughter tested positive in the last 2 days  · Fortunately patient is on room air  Monitor on COVID mild protocol  · Given immunocompromised state, started on remdesivir  · Continue Eliquis for anticoagulation  · If requires oxygen, start Decadron and baricitinib  · Check CBC, BMP and CRP in a m     · Maintain isolation  · ID following    New onset a-fib ([address] )  Assessment & Plan  · On presentation was in afib RVR with -120s  · After IV lopressor x1, converted to NSR  · Seen by cardio -> cont lopressor 25 BID, continue p o  Eliquis  · Patient will be discharged to rehab      Erythroderma  Assessment & Plan  · Patient noted to have diffuse rash on her skin    Skin is dry and peeling  · No definite diagnosis  Has been going on for the past 5 years started after being on IV antibiotics for a dental infection  · Family reported that the diagnosis of psoriasis was ruled out since the patient did not get better while on Humira for rheumatoid arthritis  · Dermatology evaluation appreciated  Erythroderma  Biopsy results show pustular psoriasis  Patient is status post shave biopsy on  and sent to [facility] for immunofluorescence  Outpatient follow-up with the dermatology  · Wound and rash care per dermatology team       Benign essential hypertension  Assessment & Plan  · Hold amlodipine as starting metoprolol for rate control  · Blood pressure appears to be stable on metoprolol    Rheumatoid arthritis (HCC)  Assessment & Plan  · Continue plaquenil, prednisone 5 mg QD        VTE Pharmacologic Prophylaxis:   Pharmacologic: Apixaban (Eliquis)  Mechanical VTE Prophylaxis in Place: Yes    Patient Centered Rounds: I have performed bedside rounds with nursing staff today  Discussions with Specialists or Other Care Team Provider: SHARRI    Education and Discussions with Family / Patient: plan of care, patient and daughter on phone  Time Spent for Care: 30 minutes  More than 50% of total time spent on counseling and coordination of care as described above  Current Length of Stay: 9 day(s)    Current Patient Status: Inpatient   Certification Statement: The patient will continue to require additional inpatient hospital stay due to Not medically stable    Discharge Plan:  When medically stable, needs placement    Code Status: Level 1 - Full Code      Subjective:   Fever overnight with T-max of 100 7° noted  Patient today's any chest pain or shortness of breadth  Tested positive for COVID  Fortunately she is on room air and saturating well      Objective:     Vitals:   Temp (24hrs), Av 1 °F (37 3 °C), Min:98 2 °F (36 8 °C), Max:100 7 °F (38 2 °C)    Temp:  [98 2 °F (36 8 °C)-100 7 °F (38 2 °C)] 98 3 °F (36 8 °C)  HR:  [71-86] 71  Resp:  [16-18] 18  BP: (129-139)/(58-64) 139/64  SpO2:  [96 %] 96 %  Body mass index is 42 76 kg/m²  Input and Output Summary (last 24 hours): Intake/Output Summary (Last 24 hours) at 1/19/2022 1639  Last data filed at 1/19/2022 0900  Gross per 24 hour   Intake 118 ml   Output 300 ml   Net -182 ml       Physical Exam:     Physical Exam  Constitutional:       General: She is not in acute distress  Cardiovascular:      Rate and Rhythm: Normal rate and regular rhythm  Heart sounds: normal , no murmurs  Pulmonary:      Effort: No respiratory distress  Breath sounds: Normal breath sounds  No wheezing or rales  Abdominal:      General: Bowel sounds are normal  There is no distension  Palpations: Abdomen is soft  Tenderness: There is no abdominal tenderness  Musculoskeletal:         General: No swelling  Skin:     General: Skin is warm  Findings: Rash present  Neurological:      Mental Status: She is alert  Comments: Awake alert and communicative     Additional Data:     Labs:    Results from last 7 days   Lab Units 01/19/22  0539   WBC Thousand/uL 3 50*   HEMOGLOBIN g/dL 12 5   HEMATOCRIT % 39 1   PLATELETS Thousands/uL 224   NEUTROS PCT % 57   LYMPHS PCT % 23   MONOS PCT % 17*   EOS PCT % 1     Results from last 7 days   Lab Units 01/19/22  0539 01/17/22  0627 01/15/22  0510   SODIUM mmol/L 137   < > 140   POTASSIUM mmol/L 3 5   < > 3 7   CHLORIDE mmol/L 105   < > 108   CO2 mmol/L 27   < > 24   BUN mg/dL 11   < > 9   CREATININE mg/dL 0 79   < > 0 93   ANION GAP mmol/L 5   < > 8   CALCIUM mg/dL 8 9   < > 8 9   ALBUMIN g/dL  --   --  2 1*   TOTAL BILIRUBIN mg/dL  --   --  0 39   ALK PHOS U/L  --   --  50   ALT U/L  --   --  27   AST U/L  --   --  28   GLUCOSE RANDOM mg/dL 86   < > 75    < > = values in this interval not displayed                   Results from last 7 days   Lab Units 01/19/22  0539 01/15/22  0510 01/14/22  1617 01/13/22  0536   PROCALCITONIN ng/ml 0 11 0 23 0 22 0 26*           * I Have Reviewed All Lab Data Listed Above  * Additional Pertinent Lab Tests Reviewed: All Labs Within Last 24 Hours Reviewed    Imaging:    FL barium swallow video w speech   Final Result by LASHAE FERNANDEZ (01/18 4097)      XR chest portable   Final Result by Jovana Chang MD (01/14 4820)      Mild increased pulmonary vascular congestion  Workstation performed: [id number]         CT abdomen pelvis wo contrast   Final Result by Noé Berrios MD (01/12 3996)   1  Right upper renal pole punctate nonobstructive calculus without further urinary calculi  2   No acute inflammatory or infectious process  Workstation performed: [id number]         CT head wo contrast   Final Result by Troy Schmid MD (01/11 0407)      No acute intracranial abnormality  Paranasal sinus disease, as described above  Workstation performed: [id number]         CT chest wo contrast   Final Result by Benitez Dao MD (01/11 0676)      No source for sepsis identified in the chest       Fatty liver  Workstation performed: [id number]         X-ray chest 1 view portable   ED Interpretation by Mika Elizabeth 8141,  (01/10 1110)   Chest x-ray interpreted me shows no acute cardiopulmonary disease, No change from August 23, 2016      Final Result by Haley Cerda MD (01/10 1159)      No acute cardiopulmonary disease  Findings concur with the preliminary report by the referring clinician already in PACS and/or our electronic record EPIC  Workstation performed: [id number]             Recent Cultures (last 7 days):     Results from last 7 days   Lab Units 01/14/22  1616 01/14/22  1615   BLOOD CULTURE  No Growth After 4 Days  No Growth After 4 Days         Last 24 Hours Medication List:   Current Facility-Administered Medications   Medication Dose Route Frequency Provider Last Rate    acetaminophen  650 mg Oral Q4H PRN Shabbir Thorpe MD      albuterol  2 puff Inhalation Q4H PRN Erin Dahl MD      ammonium lactate   Topical BID PRN Erin Dahl MD      apixaban  5 mg Oral BID Arline Marker, DO      benzonatate  100 mg Oral TID PRN Erin Dahl MD      cholecalciferol  1,000 Units Oral Daily Shabbir Thorpe MD      clotrimazole   Topical BID Shabbir Thorpe MD      diphenhydrAMINE  25 mg Oral Q6H PRN Erin Dahl MD      guaiFENesin  600 mg Oral Q12H [address] Erin Dahl MD      hydrochlorothiazide  12 5 mg Oral Daily Erin Dahl MD      hydrocodone-chlorpheniramine polistirex  5 mL Oral Q12H PRN Erin Dahl MD      hydroxychloroquine  200 mg Oral BID Shabbir Thorpe MD      lidocaine  1 patch Topical Daily Elis Montgomery PA-C      metoprolol  5 mg Intravenous Q6H PRN Shabbir Thorpe MD      metoprolol tartrate  25 mg Oral Q12H [address] [facility]      MAGALY ANTIFUNGAL   Topical BID Erin Dahl MD      pantoprazole  40 mg Oral Early Morning Shabbir Thorpe MD      predniSONE  5 mg Oral Daily Shabbir Thorpe MD      [START ON 1/20/2022] remdesivir  100 mg Intravenous Q24H Stephany Barreto MD      sodium chloride (PF)  3 mL Intravenous Q1H PRN Shabbir Thorpe MD      triamcinolone   Topical BID Erin Dahl MD          Today, Patient Was Seen By: Stephany Barreto MD    ** Please Note: Dictation voice to text software may have been used in the creation of this document   **

## 2022-01-19 NOTE — ASSESSMENT & PLAN NOTE
· Patient noted to have diffuse rash on her skin  Skin is dry and peeling  · No definite diagnosis  Has been going on for the past 5 years started after being on IV antibiotics for a dental infection  · Family reported that the diagnosis of psoriasis was ruled out since the patient did not get better while on Humira for rheumatoid arthritis  · Dermatology evaluation appreciated  Erythroderma  Biopsy results show pustular psoriasis  Patient is status post shave biopsy on 01/16 and sent to [facility] for immunofluorescence    Outpatient follow-up with the dermatology  · Wound and rash care per dermatology team

## 2022-01-19 NOTE — PROGRESS NOTES
Progress Note - Infectious Disease   Hayley Al 66 y o  female MRN: [id number]  Unit/Bed#: [facility] 531-01 Encounter: [id number]      Impression/Plan:    1  Sepsis-evolving since admission  Tachycardia, fevers, leukocytosis on admission  CT imaging on admission did not show evidence of pneumonia  Strep pneumoniae urine antigen positive, consider bronchitis or sinusitis vs viral etiology  CT A/P no abnormalities  COVID/flu/RSV PCR negative on admission  Has chronic skin erythroderma, but does not appear to have areas of cellulitis  No other clear source of infection  Procalcitonin downtrended and now negative  Completed 7 days of antibiotics for possible bronchitis and is clinically improved  Persistent cough likely post-infectious  She did have low grade fevers the last two days but has no other clinical or laboratory evidence of infection  Consider a non-infectious cause of fever such as DVT, or her underlying rheumatologic condition/skin condition  Consider microaspiration  -monitor off antibiotics               -supportive care for cough   -check COVID PCR--daughter who has visited her recently tested positive   -check LE Dopplers to evaluate for DVT     2  Coagulase negative staph in blood culture  1/2 sets on admission growing coagulase negative staph  Likely contaminant  Repeat cultures no growth  -no further treatment needed     3  Erythroderma  Patient with worsening skin rash over last 5 years  Did not improve on Humira  Status post skin biopsy 1/12 by Dermatology, pathology most consistent with pustular psoriasis  Status post shave biopsy 1/16, sent to [facility] for immunofluorescence analysis  -Dermatology follow up   -follow up pathology              -local skin care              -HTLV1 Ab negative     4  Rheumatoid arthritis  On plaquenil and prednisone  Patient is immunocompromised      5  Afib   New onset  Started on metoprolol and Eliquis       6   AMANDA   Present on admission in setting of #1  Now improved     I have discussed the above management plan in detail with the primary service  ID will follow  Antibiotics:  S/p 7 days Ceftriaxone  Off abx D3    Subjective:  ID was asked to reevaluate patient due to continued low grade fever 100 5-100 7  The patient was asymptomatic and states she did not feel any fevers  Her temperature is usually checked orally or axillary  The patient underwent barium swallow yesterday afternoon  Overall, she is feeling better  She does still have a non productive cough  No chills, chest pain, abdominal pain, new skin rashes/erythema  She reports her daughter was recently diagnosed with COVID  Objective:  Vitals:  Temp:  [98 2 °F (36 8 °C)-100 7 °F (38 2 °C)] 98 2 °F (36 8 °C)  HR:  [71-86] 71  Resp:  [16-18] 16  BP: (129-139)/(58-61) 139/61  SpO2:  [96 %] 96 %  Temp (24hrs), Av 5 °F (37 5 °C), Min:98 2 °F (36 8 °C), Max:100 7 °F (38 2 °C)  Current: Temperature: 98 2 °F (36 8 °C)    Physical Exam:   General Appearance:  Alert, interactive, nontoxic, no acute distress  Throat: Oropharynx moist without lesions  Lungs:   Clear to auscultation bilaterally; no wheezes, rhonchi or rales; respirations unlabored   Heart:  RRR; no murmur, rub or gallop   Abdomen:   Soft, non-tender, non-distended, positive bowel sounds  Extremities: No clubbing, cyanosis or edema   Skin: No rashes or lesions  No draining wounds noted  Diffuse skin desquamation with erythroderma        Labs:    All pertinent labs and imaging studies were personally reviewed  Results from last 7 days   Lab Units 22  0539 22  0627 01/15/22  0510   WBC Thousand/uL 3 50* 4 33 6 83   HEMOGLOBIN g/dL 12 5 11 8 11 6   PLATELETS Thousands/uL 224 225 205     Results from last 7 days   Lab Units 22  0539 22  0627 22  0627 01/15/22  0510 01/15/22  0510 22  0614 22  0614   SODIUM mmol/L 137  --  140  --  140   < > 138   POTASSIUM mmol/L 3 5   < > 3 5   < > 3 7   < > 3 9   CHLORIDE mmol/L 105   < > 108   < > 108   < > 106   CO2 mmol/L 27   < > 27   < > 24   < > 26   BUN mg/dL 11   < > 10   < > 9   < > 9   CREATININE mg/dL 0 79   < > 0 78   < > 0 93   < > 0 87   EGFR ml/min/1 73sq m 74   < > 75   < > 61   < > 66   CALCIUM mg/dL 8 9   < > 9 1   < > 8 9   < > 8 9   AST U/L  --   --   --   --  28  --  31   ALT U/L  --   --   --   --  27   < > 31   ALK PHOS U/L  --   --   --   --  50   < > 57    < > = values in this interval not displayed  Results from last 7 days   Lab Units 01/19/22  0539 01/15/22  0510 01/14/22  1617 01/13/22  0536   PROCALCITONIN ng/ml 0 11 0 23 0 22 0 26*                   Micro:  Results from last 7 days   Lab Units 01/14/22  1616 01/14/22  1615   BLOOD CULTURE  No Growth After 4 Days  No Growth After 4 Days  Imaging:  I have personally reviewed pertinent imaging study reports and images in PACS      CT Chest-no consolidations     Other Studies:   I have personally reviewed pertinent reports

## 2022-01-19 NOTE — ASSESSMENT & PLAN NOTE
· On presentation was in afib RVR with -120s  · After IV lopressor x1, converted to NSR  · Seen by cardio -> cont lopressor 25 BID, continue p o  Eliquis    · Patient will be discharged to rehab

## 2022-01-19 NOTE — ASSESSMENT & PLAN NOTE
· Evolving since admission, Patient with fever and tachycardia  · Appears weak, lethargic, + cough  · Send Blood cultures, check UA, CXR negative - chest CT is negative for any acute pathology  CT abdomen is rather unremarkable   · COVID/flu/RSV negative  · procalcitonin now normal   · 1/2 sets of blood culture came back positive for Gram-positive cocci in [id number]-[id number] Staph likely contaminant  · Urine strep antigen is positive-completed antibiotic course  ·  ID evaluation appreciated  · Due to recurrent low-grade fever COVID exposure with her daughter being positive and a recent visit by Daughter , COVID swab was checked on 01/19 which came back positive  · Now on remdesivir given immunocompromised state  Fortunately she is on room air, will hold off on steroids and baricitinib  · Continue Eliquis for anticoagulation

## 2022-01-19 NOTE — RESTORATIVE TECHNICIAN NOTE
Restorative Technician Note      Patient Name: Tere Jama     Note Type: Mobility  Patient Position Upon Consult: Other (Comment) (Commode)  Assistive Device: Roller walker  Patient Position at End of Consult: Bedside chair;  All needs within reach

## 2022-01-19 NOTE — CASE MANAGEMENT
Case Management Discharge Planning Note    Patient name Inder Dao  [facility] MRN [id number]  : 1950 Date 2022       Current Admission Date: 1/10/2022  Current Admission Diagnosis:Sepsis [facility])   Patient Active Problem List    Diagnosis Date Noted    New onset a-fib ([facility] ) 01/10/2022    Sepsis ([facility] ) 01/10/2022    [facility] 2021    Hyperparathyroidism ([facility] ) 2021    Medicare annual wellness visit, subsequent 2021    Murmur, cardiac 2021    Morbid obesity with BMI of 40 0-44 9, adult ([facility] ) 2019    BPPV (benign paroxysmal positional vertigo) 2018    Seasonal allergic rhinitis due to pollen 2018    Erythroderma 10/27/2017    Osteoporosis 2016    Rheumatoid arthritis ([facility] 2016    GERD (gastroesophageal reflux disease) 2016    Sarcoid 2016    Morbid obesity ([facility] ) 2016    Vitamin D deficiency 2015    Benign essential hypertension 2014    Lumbar radiculopathy 2014      LOS (days): 9  Geometric Mean LOS (GMLOS) (days): 3 50  Days to GMLOS:-5 4     OBJECTIVE:  Risk of Unplanned Readmission Score: 17         Current admission status: Inpatient   Preferred Pharmacy:   [facility] [address] [place] [id number]  Phone: [phone] Fax: [phone]    Primary Care Provider: Payal Duke DO    Primary Insurance: [facility] REP  Secondary Insurance:     DISCHARGE DETAILS:    Discharge planning discussed with[de-identified] Pt and daughter Faye Swenson of Choice: Yes  Comments - Freedom of Choice: Cm spoke with daughter Beata Cunha who requested referral to [facility]   Cm also emailed SNF list for additional choices       Treatment Team Recommendation: Short Term Rehab  Discharge Destination Plan[de-identified] Short Term Rehab  Transport at Discharge : [facility] Amb[facility]

## 2022-01-19 NOTE — PLAN OF CARE
Problem: MOBILITY - ADULT  Goal: Maintain or return to baseline ADL function  Description: INTERVENTIONS:  -  Assess patient's ability to carry out ADLs; assess patient's baseline for ADL function and identify physical deficits which impact ability to perform ADLs (bathing, care of mouth/teeth, toileting, grooming, dressing, etc )  - Assess/evaluate cause of self-care deficits   - Assess range of motion  - Assess patient's mobility; develop plan if impaired  - Assess patient's need for assistive devices and provide as appropriate  - Encourage maximum independence but intervene and supervise when necessary  - Involve family in performance of ADLs  - Assess for home care needs following discharge   - Consider OT consult to assist with ADL evaluation and planning for discharge  - Provide patient education as appropriate  Outcome: Progressing  Goal: Maintains/Returns to pre admission functional level  Description: INTERVENTIONS:  - Perform BMAT or MOVE assessment daily    - Set and communicate daily mobility goal to care team and patient/family/caregiver  - Collaborate with rehabilitation services on mobility goals if consulted  - Perform Range of Motion 2 times a day  - Reposition patient every 2 hours    - Dangle patient 2 times a day  - Stand patient 2 times a day  - Ambulate patient 2 times a day  - Out of bed to chair 2 times a day   - Out of bed for meals 2 times a day  - Out of bed for toileting  - Record patient progress and toleration of activity level   Outcome: Progressing     Problem: Potential for Falls  Goal: Patient will remain free of falls  Description: INTERVENTIONS:  - Educate patient/family on patient safety including physical limitations  - Instruct patient to call for assistance with activity   - Consult OT/PT to assist with strengthening/mobility   - Keep Call bell within reach  - Keep bed low and locked with side rails adjusted as appropriate  - Keep care items and personal belongings within reach  - Initiate and maintain comfort rounds  - Make Fall Risk Sign visible to staff  - Offer Toileting every 2 Hours, in advance of need  - Initiate/Maintain bed alarm  - Obtain necessary fall risk management equipment  - Apply yellow socks and bracelet for high fall risk patients  - Consider moving patient to room near nurses station  Outcome: Progressing     Problem: Nutrition/Hydration-ADULT  Goal: Nutrient/Hydration intake appropriate for improving, restoring or maintaining nutritional needs  Description: Monitor and assess patient's nutrition/hydration status for malnutrition  Collaborate with interdisciplinary team and initiate plan and interventions as ordered  Monitor patient's weight and dietary intake as ordered or per policy  Utilize nutrition screening tool and intervene as necessary  Determine patient's food preferences and provide high-protein, high-caloric foods as appropriate       INTERVENTIONS:  - Monitor oral intake, urinary output, labs, and treatment plans  - Assess nutrition and hydration status and recommend course of action  - Evaluate amount of meals eaten  - Assist patient with eating if necessary   - Allow adequate time for meals  - Recommend/ encourage appropriate diets, oral nutritional supplements, and vitamin/mineral supplements  - Order, calculate, and assess calorie counts as needed  - Recommend, monitor, and adjust tube feedings and TPN/PPN based on assessed needs  - Assess need for intravenous fluids  - Provide specific nutrition/hydration education as appropriate  - Include patient/family/caregiver in decisions related to nutrition  Outcome: Progressing     Problem: PAIN - ADULT  Goal: Verbalizes/displays adequate comfort level or baseline comfort level  Description: Interventions:  - Encourage patient to monitor pain and request assistance  - Assess pain using appropriate pain scale  - Administer analgesics based on type and severity of pain and evaluate response  - Implement non-pharmacological measures as appropriate and evaluate response  - Consider cultural and social influences on pain and pain management  - Notify physician/advanced practitioner if interventions unsuccessful or patient reports new pain  Outcome: Progressing     Problem: INFECTION - ADULT  Goal: Absence or prevention of progression during hospitalization  Description: INTERVENTIONS:  - Assess and monitor for signs and symptoms of infection  - Monitor lab/diagnostic results  - Monitor all insertion sites, i e  indwelling lines, tubes, and drains  - Monitor endotracheal if appropriate and nasal secretions for changes in amount and color  - [place] appropriate cooling/warming therapies per order  - Administer medications as ordered  - Instruct and encourage patient and family to use good hand hygiene technique  - Identify and instruct in appropriate isolation precautions for identified infection/condition  Outcome: Progressing     Problem: SAFETY ADULT  Goal: Maintain or return to baseline ADL function  Description: INTERVENTIONS:  -  Assess patient's ability to carry out ADLs; assess patient's baseline for ADL function and identify physical deficits which impact ability to perform ADLs (bathing, care of mouth/teeth, toileting, grooming, dressing, etc )  - Assess/evaluate cause of self-care deficits   - Assess range of motion  - Assess patient's mobility; develop plan if impaired  - Assess patient's need for assistive devices and provide as appropriate  - Encourage maximum independence but intervene and supervise when necessary  - Involve family in performance of ADLs  - Assess for home care needs following discharge   - Consider OT consult to assist with ADL evaluation and planning for discharge  - Provide patient education as appropriate  Outcome: Progressing  Goal: Maintains/Returns to pre admission functional level  Description: INTERVENTIONS:  - Perform BMAT or MOVE assessment daily    - Set and communicate daily mobility goal to care team and patient/family/caregiver  - Collaborate with rehabilitation services on mobility goals if consulted  - Perform Range of Motion 2 times a day  - Reposition patient every 2 hours    - Dangle patient 2 times a day  - Stand patient 2 times a day  - Ambulate patient 2 times a day  - Out of bed to chair 2 times a day   - Out of bed for meals 2 times a day  - Out of bed for toileting  - Record patient progress and toleration of activity level   Outcome: Progressing  Goal: Patient will remain free of falls  Description: INTERVENTIONS:  - Educate patient/family on patient safety including physical limitations  - Instruct patient to call for assistance with activity   - Consult OT/PT to assist with strengthening/mobility   - Keep Call bell within reach  - Keep bed low and locked with side rails adjusted as appropriate  - Keep care items and personal belongings within reach  - Initiate and maintain comfort rounds  - Make Fall Risk Sign visible to staff  - Offer Toileting every 2 Hours, in advance of need  - Initiate/Maintain bed alarm  - Obtain necessary fall risk management equipment  - Apply yellow socks and bracelet for high fall risk patients  - Consider moving patient to room near nurses station  Outcome: Progressing     Problem: DISCHARGE PLANNING  Goal: Discharge to home or other facility with appropriate resources  Description: INTERVENTIONS:  - Identify barriers to discharge w/patient and caregiver  - Arrange for needed discharge resources and transportation as appropriate  - Identify discharge learning needs (meds, wound care, etc )  - Arrange for interpretive services to assist at discharge as needed  - Refer to Case Management Department for coordinating discharge planning if the patient needs post-hospital services based on physician/advanced practitioner order or complex needs related to functional status, cognitive ability, or social support system  Outcome: Progressing Problem: Knowledge Deficit  Goal: Patient/family/caregiver demonstrates understanding of disease process, treatment plan, medications, and discharge instructions  Description: Complete learning assessment and assess knowledge base    Interventions:  - Provide teaching at level of understanding  - Provide teaching via preferred learning methods  Outcome: Progressing     Problem: Prexisting or High Potential for Compromised Skin Integrity  Goal: Skin integrity is maintained or improved  Description: INTERVENTIONS:  - Identify patients at risk for skin breakdown  - Assess and monitor skin integrity  - Assess and monitor nutrition and hydration status  - Monitor labs   - Assess for incontinence   - Turn and reposition patient  - Assist with mobility/ambulation  - Relieve pressure over bony prominences  - Avoid friction and shearing  - Provide appropriate hygiene as needed including keeping skin clean and dry  - Evaluate need for skin moisturizer/barrier cream  - Collaborate with interdisciplinary team   - Patient/family teaching  - Consider wound care consult   Outcome: Not Progressing

## 2022-01-19 NOTE — ASSESSMENT & PLAN NOTE
· Tested positive for COVID on 01/19  Her daughter visited her recently and daughter tested positive in the last 2 days  · Fortunately patient is on room air  Monitor on COVID mild protocol  · Given immunocompromised state, started on remdesivir  · Continue Eliquis for anticoagulation  · If requires oxygen, start Decadron and baricitinib  · Check CBC, BMP and CRP in a m     · Maintain isolation  · ID following

## 2022-01-20 LAB
ALBUMIN SERPL BCP-MCNC: 2.4 G/DL (ref 3.5–5)
ALP SERPL-CCNC: 58 U/L (ref 46–116)
ALT SERPL W P-5'-P-CCNC: 28 U/L (ref 12–78)
ANION GAP SERPL CALCULATED.3IONS-SCNC: 6 MMOL/L (ref 4–13)
AST SERPL W P-5'-P-CCNC: 31 U/L (ref 5–45)
BASOPHILS # BLD AUTO: 0.02 THOUSANDS/ΜL (ref 0–0.1)
BASOPHILS NFR BLD AUTO: 1 % (ref 0–1)
BILIRUB SERPL-MCNC: 0.14 MG/DL (ref 0.2–1)
BUN SERPL-MCNC: 14 MG/DL (ref 5–25)
CALCIUM ALBUM COR SERPL-MCNC: 10.4 MG/DL (ref 8.3–10.1)
CALCIUM SERPL-MCNC: 9.1 MG/DL (ref 8.3–10.1)
CHLORIDE SERPL-SCNC: 104 MMOL/L (ref 100–108)
CK SERPL-CCNC: 79 U/L (ref 26–192)
CO2 SERPL-SCNC: 28 MMOL/L (ref 21–32)
CREAT SERPL-MCNC: 0.81 MG/DL (ref 0.6–1.3)
CRP SERPL QL: 9.7 MG/L
EOSINOPHIL # BLD AUTO: 0.07 THOUSAND/ΜL (ref 0–0.61)
EOSINOPHIL NFR BLD AUTO: 2 % (ref 0–6)
ERYTHROCYTE [DISTWIDTH] IN BLOOD BY AUTOMATED COUNT: 14.8 % (ref 11.6–15.1)
GFR SERPL CREATININE-BSD FRML MDRD: 72 ML/MIN/1.73SQ M
GLUCOSE SERPL-MCNC: 76 MG/DL (ref 65–140)
HCT VFR BLD AUTO: 41.6 % (ref 34.8–46.1)
HGB BLD-MCNC: 13.2 G/DL (ref 11.5–15.4)
IMM GRANULOCYTES # BLD AUTO: 0.01 THOUSAND/UL (ref 0–0.2)
IMM GRANULOCYTES NFR BLD AUTO: 0 % (ref 0–2)
LYMPHOCYTES # BLD AUTO: 1.09 THOUSANDS/ΜL (ref 0.6–4.47)
LYMPHOCYTES NFR BLD AUTO: 37 % (ref 14–44)
MCH RBC QN AUTO: 27.2 PG (ref 26.8–34.3)
MCHC RBC AUTO-ENTMCNC: 31.7 G/DL (ref 31.4–37.4)
MCV RBC AUTO: 86 FL (ref 82–98)
MISCELLANEOUS LAB TEST RESULT: NORMAL
MONOCYTES # BLD AUTO: 0.35 THOUSAND/ΜL (ref 0.17–1.22)
MONOCYTES NFR BLD AUTO: 12 % (ref 4–12)
NEUTROPHILS # BLD AUTO: 1.42 THOUSANDS/ΜL (ref 1.85–7.62)
NEUTS SEG NFR BLD AUTO: 48 % (ref 43–75)
NRBC BLD AUTO-RTO: 0 /100 WBCS
PLATELET # BLD AUTO: 244 THOUSANDS/UL (ref 149–390)
PMV BLD AUTO: 10.8 FL (ref 8.9–12.7)
POTASSIUM SERPL-SCNC: 3.4 MMOL/L (ref 3.5–5.3)
PROCALCITONIN SERPL-MCNC: 0.07 NG/ML
PROT SERPL-MCNC: 7 G/DL (ref 6.4–8.2)
RBC # BLD AUTO: 4.86 MILLION/UL (ref 3.81–5.12)
SODIUM SERPL-SCNC: 138 MMOL/L (ref 136–145)
WBC # BLD AUTO: 2.96 THOUSAND/UL (ref 4.31–10.16)

## 2022-01-20 PROCEDURE — 97530 THERAPEUTIC ACTIVITIES: CPT

## 2022-01-20 PROCEDURE — 82550 ASSAY OF CK (CPK): CPT | Performed by: INTERNAL MEDICINE

## 2022-01-20 PROCEDURE — 85025 COMPLETE CBC W/AUTO DIFF WBC: CPT | Performed by: INTERNAL MEDICINE

## 2022-01-20 PROCEDURE — 86140 C-REACTIVE PROTEIN: CPT | Performed by: INTERNAL MEDICINE

## 2022-01-20 PROCEDURE — 99231 SBSQ HOSP IP/OBS SF/LOW 25: CPT | Performed by: STUDENT IN AN ORGANIZED HEALTH CARE EDUCATION/TRAINING PROGRAM

## 2022-01-20 PROCEDURE — 97110 THERAPEUTIC EXERCISES: CPT

## 2022-01-20 PROCEDURE — 99232 SBSQ HOSP IP/OBS MODERATE 35: CPT | Performed by: INTERNAL MEDICINE

## 2022-01-20 PROCEDURE — 97535 SELF CARE MNGMENT TRAINING: CPT

## 2022-01-20 PROCEDURE — 80053 COMPREHEN METABOLIC PANEL: CPT | Performed by: INTERNAL MEDICINE

## 2022-01-20 PROCEDURE — 84145 PROCALCITONIN (PCT): CPT | Performed by: INTERNAL MEDICINE

## 2022-01-20 PROCEDURE — 97116 GAIT TRAINING THERAPY: CPT

## 2022-01-20 RX ADMIN — APIXABAN 5 MG: 5 TABLET, FILM COATED ORAL at 09:34

## 2022-01-20 RX ADMIN — CLOTRIMAZOLE: 1 CREAM TOPICAL at 18:00

## 2022-01-20 RX ADMIN — GUAIFENESIN 600 MG: 600 TABLET, EXTENDED RELEASE ORAL at 20:42

## 2022-01-20 RX ADMIN — TRIAMCINOLONE ACETONIDE: 1 OINTMENT TOPICAL at 18:00

## 2022-01-20 RX ADMIN — MICONAZOLE NITRATE: 20 CREAM TOPICAL at 09:00

## 2022-01-20 RX ADMIN — HYDROXYCHLOROQUINE SULFATE 200 MG: 200 TABLET, FILM COATED ORAL at 10:00

## 2022-01-20 RX ADMIN — METOPROLOL TARTRATE 25 MG: 25 TABLET, FILM COATED ORAL at 09:34

## 2022-01-20 RX ADMIN — HYDROCHLOROTHIAZIDE 12.5 MG: 12.5 TABLET ORAL at 09:34

## 2022-01-20 RX ADMIN — LIDOCAINE 5% 1 PATCH: 700 PATCH TOPICAL at 09:34

## 2022-01-20 RX ADMIN — HYDROXYCHLOROQUINE SULFATE 200 MG: 200 TABLET, FILM COATED ORAL at 17:57

## 2022-01-20 RX ADMIN — CLOTRIMAZOLE: 1 CREAM TOPICAL at 09:00

## 2022-01-20 RX ADMIN — APIXABAN 5 MG: 5 TABLET, FILM COATED ORAL at 18:08

## 2022-01-20 RX ADMIN — GUAIFENESIN 600 MG: 600 TABLET, EXTENDED RELEASE ORAL at 09:34

## 2022-01-20 RX ADMIN — REMDESIVIR 100 MG: 100 INJECTION, POWDER, LYOPHILIZED, FOR SOLUTION INTRAVENOUS at 15:00

## 2022-01-20 RX ADMIN — PREDNISONE 5 MG: 5 TABLET ORAL at 09:34

## 2022-01-20 RX ADMIN — METOPROLOL TARTRATE 25 MG: 25 TABLET, FILM COATED ORAL at 21:02

## 2022-01-20 RX ADMIN — PANTOPRAZOLE SODIUM 40 MG: 40 TABLET, DELAYED RELEASE ORAL at 06:19

## 2022-01-20 RX ADMIN — Medication 1000 UNITS: at 09:34

## 2022-01-20 RX ADMIN — MICONAZOLE NITRATE: 20 CREAM TOPICAL at 18:00

## 2022-01-20 RX ADMIN — HYDROCODONE POLISTIREX AND CHLORPHENIRAMINE POLISTIREX 5 ML: 10; 8 SUSPENSION, EXTENDED RELEASE ORAL at 18:08

## 2022-01-20 NOTE — PLAN OF CARE
Problem: OCCUPATIONAL THERAPY ADULT  Goal: Performs self-care activities at highest level of function for planned discharge setting  See evaluation for individualized goals  Description: Treatment Interventions: ADL retraining,Functional transfer training,UE strengthening/ROM,Endurance training,Cognitive reorientation,Patient/family training,Equipment evaluation/education,Compensatory technique education,Energy conservation,Activityengagement  Equipment Recommended: Bedside commode       See flowsheet documentation for full assessment, interventions and recommendations  Outcome: Progressing  Note: Limitation: Decreased ADL status,Decreased UE strength,Decreased cognition,Decreased endurance,Decreased self-care trans,Decreased high-level ADLs,Decreased fine motor control  Prognosis: Good  Assessment: Pt greeted bedside for OT treatment on 1/20/2022 focusing on maximizing independence with ADLs and functional tranfers/mobility with RW  Pt min/mod A with bed mobility tasks, mod Ax1 with transfers, and mod Ax1 with functional mobility to bathroom  Pt mod A with toilet transfer (STD toilet and use of GB), max A with clothing mgmt, and max A with hygiene  Pt with c/o of dizziness towards end of session and sat on EOB to take BP: 146/73  Pt may benefit from [facility] next session to increased independence with txfr  Limitations that impact functional performance include decreased ADL status, decreased UE ROM, decreased UE strength, decreased safe judgement during ADLs, decreased cognition, decreased endurance, decreased self care transfers, decreased high level ADLs and pain  Occupational performance areas to address ADL retraining, functional transfer training, UE strengthening/ROM, endurance training, cognitive reorientation, Pt/caregiver education, equipment evaluation/education, compensatory technique education, energy conservation and activity engagement    Pt would benefit from continued skilled OT services while in hospital to maximize independence with ADLs  Will continue to follow Pt's goals and progress  Pt would benefit from post acute rehabilitation services upon DC to maximize safety and independence with ADLs and functional tasks of choice  OT Discharge Recommendation: Post acute rehabilitation services  OT - OK to Discharge:  Yes

## 2022-01-20 NOTE — OCCUPATIONAL THERAPY NOTE
Occupational Therapy Progress Note     Patient Name: Everett Erickson  AUFZJ'T Date: 1/20/2022  Problem List  Principal Problem:    Sepsis ([facility] [address] )  Active Problems:    Rheumatoid arthritis ([facility] [address] )    GERD (gastroesophageal reflux disease)    Benign essential hypertension    Erythroderma    Murmur, cardiac    New onset a-fib ([facility] [address] )    COVID-19              01/20/22 1159   OT Last Visit   OT Visit Date 01/20/22   Note Type   Note Type Treatment   Restrictions/Precautions   Weight Bearing Precautions Per Order No   Other Precautions Fall Risk;Pain; Airborne/isolation;Contact/isolation; Chair Alarm; Bed Alarm  (COVID +)   Lifestyle   Autonomy At baseline pt was completing ADLs IND, IADLs with assist, ambulating MOD IND with cane, (-) driving (van transportation)  Reciprocal Relationships supportive family   Service to Others retired   [address] enjoys watching TV   Pain Assessment   Pain Assessment Tool 0-10   Pain Score No Pain   ADL   Where Assessed Sitting at sink   [address]  2  Maximal Assistance   Toileting Deficit Setup;Supervison/safety; Increased time to complete;Grab bar use  (STD toilet with use GB, pt educated on safety with toileting)   Toileting Comments mod A transfer use of GB on std toilet, max A clothing mgmt, and max A hygiene  Pt with dizziness s/p toilet routine and Pt performed functional mobility with RW back to bed  BP taken seated on EOB and BP stable: 146/73  Pt may benefit from [facility] next session to increased independence with txfr  Bed Mobility   Supine to Sit 3  Moderate assistance   Additional items Increased time required;Verbal cues;LE management;Assist x 1   Sit to Supine 4  Minimal assistance   Additional items Assist x 1; Increased time required;Verbal cues   Additional Comments Pt greeted supine in bed and left supine in bed at end of session  All needs met and call bell nearby  Bed alarm engaged   Pt provided with recliner chair, however, Pt refused at this time stating she was too weak despite max education  Transfers   Sit to Stand 3  Moderate assistance   Additional items Assist x 1; Increased time required;Verbal cues   Stand to Sit 3  Moderate assistance   Additional items Assist x 1; Increased time required;Verbal cues   Toilet transfer 3  Moderate assistance   Additional items Assist x 1; Increased time required;Standard toilet;Verbal cues; Other  (GB)   Additional Comments with RW   Functional Mobility   Functional Mobility 3  Moderate assistance   Additional Comments Mod Ax1 with RW- within room distances  Additional items Rolling walker   Cognition   Overall Cognitive Status WFL   Arousal/Participation Alert; Cooperative   Attention Attends with cues to redirect   Orientation Level Oriented to person;Oriented to place; Disoriented to time   Memory Decreased recall of precautions;Decreased short term memory   Following Commands Follows one step commands without difficulty   Comments Pt pleasant and cooperative during OT session  Activity Tolerance   Activity Tolerance Patient limited by fatigue   Medical Staff Made Aware Portions of tx completed with Charity Stapleton DPT 2* to Pt's medical complexity and decreased endurance  Assessment   Assessment Pt greeted bedside for OT treatment on 1/20/2022 focusing on maximizing independence with ADLs and functional tranfers/mobility with RW  Pt min/mod A with bed mobility tasks, mod Ax1 with transfers, and mod Ax1 with functional mobility to bathroom  Pt mod A with toilet transfer (STD toilet and use of GB), max A with clothing mgmt, and max A with hygiene  Pt with c/o of dizziness towards end of session and sat on EOB to take BP: 146/73  Pt may benefit from [facility] next session to increased independence with txfr   Limitations that impact functional performance include decreased ADL status, decreased UE ROM, decreased UE strength, decreased safe judgement during ADLs, decreased cognition, decreased endurance, decreased self care transfers, decreased high level ADLs and pain  Occupational performance areas to address ADL retraining, functional transfer training, UE strengthening/ROM, endurance training, cognitive reorientation, Pt/caregiver education, equipment evaluation/education, compensatory technique education, energy conservation and activity engagement   Pt would benefit from continued skilled OT services while in hospital to maximize independence with ADLs  Will continue to follow Pt's goals and progress  Pt would benefit from post acute rehabilitation services upon DC to maximize safety and independence with ADLs and functional tasks of choice  Plan   Treatment Interventions ADL retraining;Functional transfer training;UE strengthening/ROM; Endurance training;Cognitive reorientation;Patient/family training;Equipment evaluation/education; Compensatory technique education; Energy conservation; Activityengagement   Goal Expiration Date 01/25/22   OT Treatment Day 2   OT Frequency 3-5x/wk   Recommendation   OT Discharge Recommendation Post acute rehabilitation services   Equipment Recommended Bedside commode   Commode Type Standard   OT - OK to Discharge Yes   Additional Comments  The patient's raw score on the AM-PAC Daily Activity inpatient short form is 15, standardized score is 34 69, less than 39 4  Patients at this level are likely to benefit from discharge to post-acute rehabilitation services  Please refer to the recommendation of the Occupational Therapist for safe discharge planning     AM-PAC Daily Activity Inpatient   Lower Body Dressing 2   Bathing 2   Toileting 2   Upper Body Dressing 3   Grooming 3   Eating 3   Daily Activity Raw Score 15   Daily Activity Standardized Score (Calc for Raw Score >=11) 34 69   AM-[facility] Applied Cognition Inpatient   Following a Speech/Presentation 3   Understanding Ordinary Conversation 4   Taking Medications 3   Remembering Where Things Are Placed or Put Away 3   Remembering List of 4-5 Errands 3   Taking Care of Complicated Tasks 3   Applied Cognition Raw Score 19   Applied Cognition Standardized Score 39 77       Amparo Hathaway MS, OTR/L

## 2022-01-20 NOTE — ASSESSMENT & PLAN NOTE
· Evolving since admission, Patient with fever and tachycardia  · Appears weak, lethargic, + cough  · Send Blood cultures, check UA, CXR negative - chest CT is negative for any acute pathology  CT abdomen is rather unremarkable   · COVID/flu/RSV negative  · procalcitonin now normal   · 1/2 sets of blood culture came back positive for Gram-positive cocci in xykktvku-erwciphzi-vokemkel Staph likely contaminant  · Urine strep antigen is positive-completed antibiotic course  ·  ID evaluation appreciated  · Due to recurrent low-grade fever COVID exposure with her daughter being positive and a recent visit by Daughter , COVID swab was checked on 01/19 which came back positive  · Now on remdesivir given immunocompromised state  Fortunately she is on room air, will hold off on steroids and baricitinib  · Continue Eliquis for anticoagulation

## 2022-01-20 NOTE — PROGRESS NOTES
[address] [facility]  Progress Note - Hung Escobar 1950, 66 y o  female MRN: [id number]  Unit/Bed#: [facility] 807-01 Encounter: [id number]  Primary Care Provider: Kae Hernandez DO   Date and time admitted to hospital: 1/10/2022 10:11 AM    * Sepsis [facility])  Assessment & Plan  · Evolving since admission, Patient with fever and tachycardia  · Appears weak, lethargic, + cough  · Send Blood cultures, check UA, CXR negative - chest CT is negative for any acute pathology  CT abdomen is rather unremarkable   · COVID/flu/RSV negative  · procalcitonin now normal   · 1/2 sets of blood culture came back positive for Gram-positive cocci in ctgbrxhj-dfhqzamlr-tibrjijz Staph likely contaminant  · Urine strep antigen is positive-completed antibiotic course  ·  ID evaluation appreciated  · Due to recurrent low-grade fever COVID exposure with her daughter being positive and a recent visit by Daughter , COVID swab was checked on 01/19 which came back positive  · Now on remdesivir given immunocompromised state  Fortunately she is on room air, will hold off on steroids and baricitinib  · Continue Eliquis for anticoagulation  COVID-19  Assessment & Plan  · Tested positive for COVID on 01/19  Her daughter visited her recently and daughter tested positive in the last 2 days  · Fortunately patient is on room air  Monitor on COVID mild protocol  · Given immunocompromised state, started on remdesivir  · Continue Eliquis for anticoagulation  · If requires oxygen, start Decadron and baricitinib  · Check CBC, BMP and CRP in a m     · Maintain isolation  · ID following    New onset a-fib ([facility] [address] )  Assessment & Plan  · On presentation was in afib RVR with -120s  · After IV lopressor x1, converted to NSR  · Seen by cardio -> cont lopressor 25 BID, continue p o  Eliquis  · Patient will be discharged to rehab      Erythroderma  Assessment & Plan  · Patient noted to have diffuse rash on her skin    Skin is dry and peeling  · No definite diagnosis  Has been going on for the past 5 years started after being on IV antibiotics for a dental infection  · Family reported that the diagnosis of psoriasis was ruled out since the patient did not get better while on Humira for rheumatoid arthritis  · Dermatology evaluation appreciated  Erythroderma  Biopsy results show pustular psoriasis  Patient is status post shave biopsy on  and sent to [facility] for immunofluorescence  Outpatient follow-up with the dermatology  · Wound and rash care per dermatology team       Benign essential hypertension  Assessment & Plan  · Stable  · Continue all metoprolol and HCTZ    Rheumatoid arthritis (HCC)  Assessment & Plan  · Continue plaquenil, prednisone 5 mg QD        VTE Pharmacologic Prophylaxis:   Pharmacologic: Apixaban (Eliquis)  Mechanical VTE Prophylaxis in Place: Yes    Patient Centered Rounds: I have performed bedside rounds with nursing staff today  Discussions with Specialists or Other Care Team Provider:     Education and Discussions with Family / Patient: pt  Tried calling daughter unable to reach    Time Spent for Care: 20 minutes  More than 50% of total time spent on counseling and coordination of care as described above  Current Length of Stay: 10 day(s)    Current Patient Status: Inpatient   Certification Statement: The patient will continue to require additional inpatient hospital stay due to above    Discharge Plan: pendingplacement    Code Status: Level 1 - Full Code      Subjective:   Pt seen and examined by me this morning  Pt denied any specific comoplaisn except for feeling sleepy    Objective:     Vitals:   Temp (24hrs), Av °F (37 2 °C), Min:98 2 °F (36 8 °C), Max:99 6 °F (37 6 °C)    Temp:  [98 2 °F (36 8 °C)-99 6 °F (37 6 °C)] 98 2 °F (36 8 °C)  HR:  [65-73] 65  Resp:  [12-18] 18  BP: (125-146)/(51-73) 141/66  SpO2:  [92 %-97 %] 97 %  Body mass index is 42 76 kg/m²       Input and Output Summary (last 24 hours): Intake/Output Summary (Last 24 hours) at 1/20/2022 1646  Last data filed at 1/20/2022 1300  Gross per 24 hour   Intake 840 ml   Output 1405 ml   Net -565 ml       Physical Exam:     Physical Exam    Constitutional: Pt appears comfortable  Not in any acute distress  HENT:   Head: Normocephalic and atraumatic  Eyes: EOM are normal    Neck: Neck supple  Cardiovascular: Normal rate, regular rhythm, normal heart sounds  No murmur heard  Pulmonary/Chest: Effort normal, air entry b/l equal  No respiratory distress  Pt has no wheezes or crackles  Abdominal: Soft  Non-distended, Non-tender  Bowel sounds are normal    Neurological: awake, alert  Moving all extremities spontaneously  Psychiatric: normal mood and affect  Additional Data:     Labs:    Results from last 7 days   Lab Units 01/20/22  0508   WBC Thousand/uL 2 96*   HEMOGLOBIN g/dL 13 2   HEMATOCRIT % 41 6   PLATELETS Thousands/uL 244   NEUTROS PCT % 48   LYMPHS PCT % 37   MONOS PCT % 12   EOS PCT % 2     Results from last 7 days   Lab Units 01/20/22  0508   SODIUM mmol/L 138   POTASSIUM mmol/L 3 4*   CHLORIDE mmol/L 104   CO2 mmol/L 28   BUN mg/dL 14   CREATININE mg/dL 0 81   ANION GAP mmol/L 6   CALCIUM mg/dL 9 1   ALBUMIN g/dL 2 4*   TOTAL BILIRUBIN mg/dL 0 14*   ALK PHOS U/L 58   ALT U/L 28   AST U/L 31   GLUCOSE RANDOM mg/dL 76                 Results from last 7 days   Lab Units 01/20/22  0508 01/19/22  0539 01/15/22  0510 01/14/22  1617   PROCALCITONIN ng/ml 0 07 0 11 0 23 0 22           * I Have Reviewed All Lab Data Listed Above  * Additional Pertinent Lab Tests Reviewed: She 64 Admission Reviewed    Imaging:    Imaging Reports Reviewed Today Include:   Imaging Personally Reviewed by Myself Includes:      Recent Cultures (last 7 days):     Results from last 7 days   Lab Units 01/14/22  1616 01/14/22  1615   BLOOD CULTURE  No Growth After 5 Days  No Growth After 5 Days         Last 24 Hours Medication List:   Current Facility-Administered Medications   Medication Dose Route Frequency Provider Last Rate    acetaminophen  650 mg Oral Q4H PRN Shabbir Thorpe MD      albuterol  2 puff Inhalation Q4H PRN Erin Dahl MD      ammonium lactate   Topical BID PRN Erin Dahl MD      apixaban  5 mg Oral BID [facility]      benzonatate  100 mg Oral TID PRN Erin Dahl MD      cholecalciferol  1,000 Units Oral Daily Shabbir Thorpe MD      clotrimazole   Topical BID Shabbir Thorpe MD      diphenhydrAMINE  25 mg Oral Q6H PRN Erin Dahl MD      guaiFENesin  600 mg Oral Q12H [facility] Erin Dahl MD      hydrochlorothiazide  12 5 mg Oral Daily Erin Dahl MD      hydrocodone-chlorpheniramine polistirex  5 mL Oral Q12H PRN Erin Dahl MD      hydroxychloroquine  200 mg Oral BID Shabbir Thorpe MD      lidocaine  1 patch Topical Daily [facility]      metoprolol  5 mg Intravenous Q6H PRN Shabbir Thorpe MD      metoprolol tartrate  25 mg Oral Q12H [facility] [place] [place]      MAGALY ANTIFUNGAL   Topical BID Erin Dahl MD      pantoprazole  40 mg Oral Early Morning Shabbir Thorpe MD      predniSONE  5 mg Oral Daily Shabbir Thorpe MD      remdesivir  100 mg Intravenous Q24H Stephany Barreto MD      sodium chloride (PF)  3 mL Intravenous Q1H PRN Shabbir Thorpe MD      triamcinolone   Topical BID Erin Dahl MD          Today, Patient Was Seen By: Sarah Lieberman MD    ** Please Note: Dictation voice to text software may have been used in the creation of this document   **

## 2022-01-20 NOTE — PHYSICAL THERAPY NOTE
Physical Therapy Treatment Note    Patient's Name: Gerald Pitts  : 22 1156   PT Last Visit   PT Visit Date 22   Note Type   Note Type Treatment   Pain Assessment   Pain Assessment Tool 0-10   Pain Score No Pain   Restrictions/Precautions   Weight Bearing Precautions Per Order No   Other Precautions Airborne/isolation; Chair Alarm; Bed Alarm; Fall Risk  (COVID-19(+))   General   Chart Reviewed Yes   Response to Previous Treatment Patient with no complaints from previous session  Family/Caregiver Present No   Subjective   Subjective Pt agreeable to mobilize  Bed Mobility   Supine to Sit 3  Moderate assistance   Additional items Assist x 1;Bedrails; Increased time required;Verbal cues;LE management   Sit to Supine 4  Minimal assistance   Additional items Assist x 1; Increased time required;Verbal cues; Bedrails   Additional Comments Pt greeted in supine  Cues for bridging and scooting for improved positioning in bed  Transfers   Sit to Stand 3  Moderate assistance   Additional items Assist x 1; Increased time required;Verbal cues   Stand to Sit 3  Moderate assistance   Additional items Assist x 1; Increased time required;Verbal cues   Stand pivot 3  Moderate assistance   Additional items Assist x 1; Increased time required;Verbal cues   Toilet transfer 3  Moderate assistance   Additional items Assist x 1; Increased time required;Verbal cues;Standard toilet  (grab bar)   Additional Comments RW, cues for set-up   Ambulation/Elevation   Gait pattern Excessively slow; Short stride;Decreased foot clearance   Gait Assistance 3  Moderate assist   Additional items Assist x 1;Verbal cues; Tactile cues   Assistive Device Rolling walker   Distance 10'x2   Balance   Static Sitting Fair +   Dynamic Sitting Fair   Static Standing Poor +   Dynamic Standing Poor +   Ambulatory Poor  (RW)   Endurance Deficit   Endurance Deficit Yes   Endurance Deficit Description weakness, fatigue, dizziness (/73, HR 73) Activity Tolerance   Activity Tolerance Patient limited by fatigue;Treatment limited secondary to medical complications (Comment)  (dizziness)   Medical Staff Made Aware OT Lashaun Velasquez + RN Kaitlin   Nurse Made Aware yes - cleared pt for PT + updated at end of session   Exercises   Hip Abduction Sitting;15 reps;AROM; Bilateral   Hip Adduction Sitting;15 reps;AROM; Bilateral   Knee AROM Long Arc Quad Sitting;15 reps;AROM; Bilateral   Marching Sitting;15 reps;AROM; Bilateral   Assessment   Prognosis Good   Problem List Decreased strength;Decreased endurance; Impaired balance;Decreased mobility; Decreased safety awareness; Obesity; Decreased skin integrity   Assessment Pt seen for PT session w/ focus on transfer training, bed mobility training, gait training, and HEP instruction to increase LE strength  Pt w/ excellent progress towards goals this session as she required assistx1 for all levels of functional mobility  Pt denied SOB throughout activity but primarily limited by c/o dizziness (see flow sheet for BP)  Pt educated importance of sitting OOB, declined at end of PT session due to dizziness/fatigue; encouraged pt to sit OOB w/ nursing staff later on  From a PT standpoint continue to recommend rehab upon d/c    Barriers to Discharge Inaccessible home environment;Decreased caregiver support   Goals   Patient Goals go home   PT Treatment Day 3   Plan   Treatment/Interventions Functional transfer training;LE strengthening/ROM; Therapeutic exercise; Endurance training;Patient/family training;Equipment eval/education; Bed mobility;Gait training; Compensatory technique education;Spoke to nursing;OT   Progress Progressing toward goals   PT Frequency 3-5x/wk   Recommendation   PT Discharge Recommendation Post acute rehabilitation services   AM-PAC Basic Mobility Inpatient   Turning in Bed Without Bedrails 3   Lying on Back to Sitting on Edge of Flat Bed 2   Moving Bed to Chair 2   Standing Up From Chair 2   Walk in Room 2   Climb 3-5 Stairs 1   Basic Mobility Inpatient Raw Score 12   Basic Mobility Standardized Score 32 23   Highest Level Of Mobility   -[facility] Goal 4: Move to chair/commode   Education   Education Provided Mobility training;Home exercise program;Assistive device   Patient Demonstrates acceptance/verbal understanding;Reinforcement needed   End of Consult   Patient Position at End of Consult Supine;Bed/Chair alarm activated; All needs within reach  (in NAD)     Molina Johnson, PT, DPT

## 2022-01-20 NOTE — ASSESSMENT & PLAN NOTE
· Patient noted to have diffuse rash on her skin  Skin is dry and peeling  · No definite diagnosis  Has been going on for the past 5 years started after being on IV antibiotics for a dental infection  · Family reported that the diagnosis of psoriasis was ruled out since the patient did not get better while on Humira for rheumatoid arthritis  · Dermatology evaluation appreciated  Erythroderma  Biopsy results show pustular psoriasis  Patient is status post shave biopsy on 01/16 and sent to [facility] for immunofluorescence    Outpatient follow-up with the dermatology  · Wound and rash care per dermatology team

## 2022-01-20 NOTE — PROGRESS NOTES
Progress Note - Infectious Disease   Trena Al 66 y o  female MRN: [id number]  Unit/Bed#: [facility] 807-01 Encounter: [id number]      Impression/Plan:    1  Sepsis-evolving since admission  Tachycardia, fevers, leukocytosis on admission  CT imaging on admission did not show evidence of pneumonia  Strep pneumoniae urine antigen positive, consider bronchitis or sinusitis vs viral etiology  CT A/P no abnormalities  COVID/flu/RSV PCR negative on admission  Has chronic skin erythroderma, but does not appear to have areas of cellulitis  Procalcitonin downtrended and now negative  Completed 7 days of antibiotics for possible bronchitis and is clinically improved  However, she again had low grade fevers and daughter who had been visiting her recently tested positive for COVID, so repeat COVID PCR sent 1/19 and is positive  Other than mild cough and low grade fever, she is clinically doing well               -monitor off antibiotics               -supportive care for cough   -COVID management as below     2  COVID-19  PCR negative on admission, repeat 1/19 positive  Patient continued to have cough and low grade fevers, and daughter recently tested positive (has been visiting)  Patient on RA  She is at high risk of severe disease due to immunocompromised status, so started on remdesivir   -continue remdesivir while hospitalized for 5 days or until rehab bed available   -no indication for steroids or baracitinib    3  Coagulase negative staph in blood culture  1/2 sets on admission growing coagulase negative staph  Likely contaminant  Repeat cultures no growth  -no further treatment needed     4  Erythroderma  Patient with worsening skin rash over last 5 years  Did not improve on Humira  Status post skin biopsy 1/12 by Dermatology, pathology most consistent with pustular psoriasis  Status post shave biopsy 1/16, sent to [facility] for immunofluorescence analysis                -Dermatology follow up   -follow up pathology -local skin care              -HTLV1 Ab negative     5  Rheumatoid arthritis  On plaquenil and prednisone  Patient is immunocompromised      6  Afib   New onset  Started on metoprolol and Eliquis       7  AMANDA   Present on admission in setting of #1  Now improved     I have discussed the above management plan in detail with the primary service  ID will follow  Antibiotics:  S/p 7 days Ceftriaxone  Off abx D4  Remdesivir D2    Subjective:  Patient feeling well today, no complaints  Denies fever, chills, shortness of breath, no skin lesions  Cough is improved  Objective:  Vitals:  Temp:  [98 3 °F (36 8 °C)-99 6 °F (37 6 °C)] 99 6 °F (37 6 °C)  HR:  [68-69] 68  Resp:  [18] 18  BP: (125-139)/(51-68) 133/51  SpO2:  [94 %-95 %] 94 %  Temp (24hrs), Av 2 °F (37 3 °C), Min:98 3 °F (36 8 °C), Max:99 6 °F (37 6 °C)  Current: Temperature: 99 6 °F (37 6 °C)    Physical Exam:   General Appearance:  Alert, interactive, nontoxic, no acute distress  Throat: Oropharynx moist without lesions  Lungs:   Clear to auscultation bilaterally; no wheezes, rhonchi or rales; respirations unlabored   Heart:  RRR; no murmur, rub or gallop   Abdomen:   Soft, non-tender, non-distended, positive bowel sounds  Extremities: No clubbing, cyanosis or edema   Skin: No rashes or lesions  No draining wounds noted  Diffuse skin desquamation with erythroderma        Labs:    All pertinent labs and imaging studies were personally reviewed  Results from last 7 days   Lab Units 22  0508 22  0539 22  0627   WBC Thousand/uL 2 96* 3 50* 4 33   HEMOGLOBIN g/dL 13 2 12 5 11 8   PLATELETS Thousands/uL 244 224 225     Results from last 7 days   Lab Units 22  0508 22  0539 22  0539 22  0627 22  0627 01/15/22  0510 01/15/22  0510 22  0614 22  0614   SODIUM mmol/L 138  --  137  --  140   < > 140   < > 138   POTASSIUM mmol/L 3 4*   < > 3 5   < > 3 5   < > 3 7   < > 3 9   CHLORIDE mmol/L 104   < > 105   < > 108   < > 108   < > 106   CO2 mmol/L 28   < > 27   < > 27   < > 24   < > 26   BUN mg/dL 14   < > 11   < > 10   < > 9   < > 9   CREATININE mg/dL 0 81   < > 0 79   < > 0 78   < > 0 93   < > 0 87   EGFR ml/min/1 73sq m 72   < > 74   < > 75   < > 61   < > 66   CALCIUM mg/dL 9 1   < > 8 9   < > 9 1   < > 8 9   < > 8 9   AST U/L 31  --   --   --   --   --  28  --  31   ALT U/L 28  --   --   --   --    < > 27   < > 31   ALK PHOS U/L 58  --   --   --   --    < > 50   < > 57    < > = values in this interval not displayed  Results from last 7 days   Lab Units 01/20/22  0508 01/19/22  0539 01/15/22  0510 01/14/22  1617   PROCALCITONIN ng/ml 0 07 0 11 0 23 0 22     Results from last 7 days   Lab Units 01/20/22  0508   CRP mg/L 9 7*               Micro:  Results from last 7 days   Lab Units 01/14/22  1616 01/14/22  1615   BLOOD CULTURE  No Growth After 5 Days  No Growth After 5 Days  Imaging:  I have personally reviewed pertinent imaging study reports and images in PACS      CT Chest-no consolidations     Other Studies:   I have personally reviewed pertinent reports

## 2022-01-21 PROCEDURE — 99231 SBSQ HOSP IP/OBS SF/LOW 25: CPT | Performed by: STUDENT IN AN ORGANIZED HEALTH CARE EDUCATION/TRAINING PROGRAM

## 2022-01-21 PROCEDURE — 99232 SBSQ HOSP IP/OBS MODERATE 35: CPT | Performed by: INTERNAL MEDICINE

## 2022-01-21 RX ORDER — ECHINACEA PURPUREA EXTRACT 125 MG
1 TABLET ORAL
Status: DISCONTINUED | OUTPATIENT
Start: 2022-01-21 | End: 2022-01-26 | Stop reason: HOSPADM

## 2022-01-21 RX ADMIN — MICONAZOLE NITRATE: 20 CREAM TOPICAL at 18:33

## 2022-01-21 RX ADMIN — METOPROLOL TARTRATE 25 MG: 25 TABLET, FILM COATED ORAL at 20:29

## 2022-01-21 RX ADMIN — PREDNISONE 5 MG: 5 TABLET ORAL at 11:55

## 2022-01-21 RX ADMIN — Medication 1000 UNITS: at 11:55

## 2022-01-21 RX ADMIN — APIXABAN 5 MG: 5 TABLET, FILM COATED ORAL at 18:33

## 2022-01-21 RX ADMIN — REMDESIVIR 100 MG: 100 INJECTION, POWDER, LYOPHILIZED, FOR SOLUTION INTRAVENOUS at 15:00

## 2022-01-21 RX ADMIN — GUAIFENESIN 600 MG: 600 TABLET, EXTENDED RELEASE ORAL at 20:19

## 2022-01-21 RX ADMIN — METOPROLOL TARTRATE 25 MG: 25 TABLET, FILM COATED ORAL at 11:55

## 2022-01-21 RX ADMIN — HYDROCHLOROTHIAZIDE 12.5 MG: 12.5 TABLET ORAL at 11:55

## 2022-01-21 RX ADMIN — HYDROXYCHLOROQUINE SULFATE 200 MG: 200 TABLET, FILM COATED ORAL at 18:34

## 2022-01-21 RX ADMIN — TRIAMCINOLONE ACETONIDE: 1 OINTMENT TOPICAL at 18:34

## 2022-01-21 RX ADMIN — CLOTRIMAZOLE: 1 CREAM TOPICAL at 18:34

## 2022-01-21 RX ADMIN — MICONAZOLE NITRATE: 20 CREAM TOPICAL at 11:57

## 2022-01-21 RX ADMIN — APIXABAN 5 MG: 5 TABLET, FILM COATED ORAL at 11:55

## 2022-01-21 RX ADMIN — CLOTRIMAZOLE: 1 CREAM TOPICAL at 11:57

## 2022-01-21 RX ADMIN — TRIAMCINOLONE ACETONIDE: 1 OINTMENT TOPICAL at 11:56

## 2022-01-21 RX ADMIN — PANTOPRAZOLE SODIUM 40 MG: 40 TABLET, DELAYED RELEASE ORAL at 05:39

## 2022-01-21 RX ADMIN — GUAIFENESIN 600 MG: 600 TABLET, EXTENDED RELEASE ORAL at 11:55

## 2022-01-21 RX ADMIN — HYDROXYCHLOROQUINE SULFATE 200 MG: 200 TABLET, FILM COATED ORAL at 11:56

## 2022-01-21 NOTE — PROGRESS NOTES
[address] 1950, 66 y o  female MRN: [id number]  Unit/Bed#: [facility] 807-01 Encounter: [id number]  Primary Care Provider: Sunita Salinas DO   Date and time admitted to hospital: 1/10/2022 10:11 AM    * Sepsis [facility])  Assessment & Plan  · Resolved  · Evolving since admission, Patient with fever and tachycardia  · Send Blood cultures, check UA, CXR negative - chest CT is negative for any acute pathology  CT abdomen is rather unremarkable   · COVID/flu/RSV negative  · procalcitonin now normal   · 1/2 sets of blood culture came back positive for Gram-positive cocci in whvsrrjn-ifipopbwo-ijyajwhv Staph likely contaminant  · Urine strep antigen is positive-completed antibiotic course  · Due to recurrent low-grade fever COVID exposure with her daughter being positive and a recent visit by Daughter , COVID swab was checked on 01/19 which came back positive  · Now on remdesivir given immunocompromised state  · Maintaining O2 sats on ambient air  · Continue Eliquis for anticoagulation  · Infectious Disease input noted    COVID-19  Assessment & Plan  · Tested positive for COVID on 01/19  Her daughter visited her recently and daughter tested positive in the last 2 days  · Maintaining O2 sats on ambient air  · Placed on mild treatment protocol  · Monitor O2 sats  · Supportive care    New onset a-fib [facility])  Assessment & Plan  · On presentation was in afib RVR with -120s  · After IV lopressor x1, converted to NSR  · Seen by cardio -> cont lopressor 25 BID, continue p o  Eliquis  · Patient will be discharged to rehab      Erythroderma  Assessment & Plan  · Patient noted to have diffuse rash on her skin  Skin is dry and peeling  · No definite diagnosis    Has been going on for the past 5 years started after being on IV antibiotics for a dental infection  · Family reported that the diagnosis of psoriasis was ruled out since the patient did not get better while on Humira for rheumatoid arthritis  · Dermatology evaluation appreciated  Erythroderma  Biopsy results show pustular psoriasis  Patient is status post shave biopsy on  and sent to [facility] for immunofluorescence  Outpatient follow-up with the dermatology  · Wound and rash care per dermatology team       Morbid obesity [facility])  Assessment & Plan  Therapeutic lifestyle modification    Benign essential hypertension  Assessment & Plan  · Monitor blood pressures  · Avoid hypotension    Rheumatoid arthritis (HCC)  Assessment & Plan  · Continue plaquenil, prednisone 5 mg QD        VTE Pharmacologic Prophylaxis:   High Risk (Score >/= 5) - Pharmacological DVT Prophylaxis Ordered: apixaban (Eliquis)  Sequential Compression Devices Ordered  Patient Centered Rounds: I performed bedside rounds with nursing staff today  Discussions with Specialists or Other Care Team Provider:     Education and Discussions with Family / Patient: patient,updated daughter Ladanhaydee Duarte in detail   Time Spent for Care: 30 minutes  More than 50% of total time spent on counseling and coordination of care as described above  Current Length of Stay: 11 day(s)  Current Patient Status: Inpatient   Certification Statement: The patient will continue to require additional inpatient hospital stay due to as outlined  Discharge Plan: awaiting clinical and symptomatic improvement     Code Status: Level 1 - Full Code    Subjective:     Comfortably in bed  Reports feeling okay  Reports feeling tired  History chart labs medications reviewed    Objective:     Vitals:   Temp (24hrs), Av 8 °F (37 1 °C), Min:96 8 °F (36 °C), Max:100 7 °F (38 2 °C)    Temp:  [96 8 °F (36 °C)-100 7 °F (38 2 °C)] 96 8 °F (36 °C)  HR:  [63-69] 69  Resp:  [16-18] 18  BP: (105-144)/(51-71) 144/71  SpO2:  [95 %-98 %] 96 %  Body mass index is 42 76 kg/m²  Input and Output Summary (last 24 hours):      Intake/Output Summary (Last 24 hours) at 2022 [facility] filed at 1/20/2022 1801  Gross per 24 hour   Intake --   Output 600 ml   Net -600 ml       Physical Exam:   Physical Exam     Comfortably in bed  Obese  Short thick neck  Lungs diminished breath sounds bilateral  Heart sounds S1-S2 noted distant  Abdomen soft  Abdominal obesity noted  Awake obeys simple commands  Skin excoriation lesions noted  Pulses noted  No rash    Additional Data:     Labs:  Results from last 7 days   Lab Units 01/20/22  0508   WBC Thousand/uL 2 96*   HEMOGLOBIN g/dL 13 2   HEMATOCRIT % 41 6   PLATELETS Thousands/uL 244   NEUTROS PCT % 48   LYMPHS PCT % 37   MONOS PCT % 12   EOS PCT % 2     Results from last 7 days   Lab Units 01/20/22  0508   SODIUM mmol/L 138   POTASSIUM mmol/L 3 4*   CHLORIDE mmol/L 104   CO2 mmol/L 28   BUN mg/dL 14   CREATININE mg/dL 0 81   ANION GAP mmol/L 6   CALCIUM mg/dL 9 1   ALBUMIN g/dL 2 4*   TOTAL BILIRUBIN mg/dL 0 14*   ALK PHOS U/L 58   ALT U/L 28   AST U/L 31   GLUCOSE RANDOM mg/dL 76                 Results from last 7 days   Lab Units 01/20/22  0508 01/19/22  0539 01/15/22  0510 01/14/22  1617   PROCALCITONIN ng/ml 0 07 0 11 0 23 0 22       Lines/Drains:  Invasive Devices  Report    Peripheral Intravenous Line            Peripheral IV 01/20/22 Right Forearm <1 day          Drain            External Urinary Catheter 9 days                      Imaging: Reviewed radiology reports from this admission including: chest xray, abdominal/pelvic CT and ECHO    Recent Cultures (last 7 days):   Results from last 7 days   Lab Units 01/14/22  1616 01/14/22  1615   BLOOD CULTURE  No Growth After 5 Days  No Growth After 5 Days         Last 24 Hours Medication List:   Current Facility-Administered Medications   Medication Dose Route Frequency Provider Last Rate    acetaminophen  650 mg Oral Q4H PRN Lilia Foster MD      albuterol  2 puff Inhalation Q4H PRN Masha Magana MD      ammonium lactate   Topical BID PRN Masha Magana MD      apixaban  5 mg Oral BID Jo Pulido      benzonatate  100 mg Oral TID PRN Adam Kennedy MD      cholecalciferol  1,000 Units Oral Daily Ana M Nielsen MD      clotrimazole   Topical BID Ana M Nielsen MD      diphenhydrAMINE  25 mg Oral Q6H PRN Adam Kennedy MD      guaiFENesin  600 mg Oral Q12H [address] Adam Kennedy MD      hydrochlorothiazide  12 5 mg Oral Daily Adam Kennedy MD      hydrocodone-chlorpheniramine polistirex  5 mL Oral Q12H PRN Adam Kennedy MD      hydroxychloroquine  200 mg Oral BID Ana M Nielsen MD      lidocaine  1 patch Topical Daily [facility]      metoprolol  5 mg Intravenous Q6H PRN Ana M Nielsen MD      metoprolol tartrate  25 mg Oral Q12H [address] Randall Pulidolarobyna      MAGALY ANTIFUNGAL   Topical BID Adam Kennedy MD      pantoprazole  40 mg Oral Early Morning Ana M Nielsen MD      predniSONE  5 mg Oral Daily Ana M Nielsen MD      remdesivir  100 mg Intravenous Q24H Tammy Valladares MD      sodium chloride  1 spray Each Nare Q1H PRN Zay Chowdary MD      sodium chloride (PF)  3 mL Intravenous Q1H PRN Ana M Nielsen MD      triamcinolone   Topical BID Adam Kennedy MD          Today, Patient Was Seen By: Jenny Lin MD    **Please Note: This note may have been constructed using a voice recognition system  **

## 2022-01-21 NOTE — ASSESSMENT & PLAN NOTE
· Tested positive for COVID on 01/19  Her daughter visited her recently and daughter tested positive in the last 2 days    · Maintaining O2 sats on ambient air  · Placed on mild treatment protocol  · Monitor O2 sats  · Supportive care

## 2022-01-21 NOTE — PROGRESS NOTES
Progress Note - Infectious Disease   Juvenal Al 66 y o  female MRN: [id number]  Unit/Bed#: [facility] 807-01 Encounter: [id number]      Impression/Plan:    1  Sepsis-evolving since admission  Tachycardia, fevers, leukocytosis on admission  CT imaging on admission did not show evidence of pneumonia  Strep pneumoniae urine antigen positive, consider bronchitis or sinusitis vs viral etiology  CT A/P no abnormalities  COVID/flu/RSV PCR negative on admission  Has chronic skin erythroderma, but does not appear to have areas of cellulitis  Procalcitonin downtrended and now negative  Completed 7 days of antibiotics for possible bronchitis and is clinically improved  However, she again had low grade fevers and daughter who had been visiting her recently tested positive for COVID, so repeat COVID PCR sent 1/19 and is positive  Other than mild cough and low grade fever, she is clinically doing well               -monitor off antibiotics               -supportive care for cough   -COVID management as below     2  COVID-19  PCR negative on admission, repeat 1/19 positive  Patient continued to have cough and low grade fevers, and daughter recently tested positive (has been visiting)  Patient on RA  She is at high risk of severe disease due to immunocompromised status, so started on remdesivir   -continue remdesivir while hospitalized for 5 days or until rehab bed available   -no indication for steroids or baracitinib    3  Coagulase negative staph in blood culture  1/2 sets on admission growing coagulase negative staph  Likely contaminant  Repeat cultures no growth  -no further treatment needed     4  Erythroderma  Patient with worsening skin rash over last 5 years  Did not improve on Humira  Status post skin biopsy 1/12 by Dermatology, pathology most consistent with pustular psoriasis  Status post shave biopsy 1/16, sent to [facility] for immunofluorescence analysis                -Dermatology follow up   -follow up pathology -local skin care              -HTLV1 Ab negative     5  Rheumatoid arthritis  On plaquenil and prednisone  Patient is immunocompromised      6  Afib   New onset  Started on metoprolol and Eliquis       7  AMANDA   Present on admission in setting of #1  Now improved     I have discussed the above management plan in detail with the primary service  ID will see again  if remains inpatient  Please call with questions  Antibiotics:  S/p 7 days Ceftriaxone  Off abx D5  Remdesivir D3    Subjective:  Patient feeling well today  Having some nasal congestion due to dry air  Cough improved  Tmax 100 7 yesterday  She denies shortness of breath, chills, chest pain, abdominal pain  Objective:  Vitals:  Temp:  [98 2 °F (36 8 °C)-100 7 °F (38 2 °C)] 98 8 °F (37 1 °C)  HR:  [63-73] 69  Resp:  [12-18] 16  BP: (105-146)/(51-73) 122/66  SpO2:  [92 %-98 %] 96 %  Temp (24hrs), Av 3 °F (37 4 °C), Min:98 2 °F (36 8 °C), Max:100 7 °F (38 2 °C)  Current: Temperature: 98 8 °F (37 1 °C)    Physical Exam:   General Appearance:  Alert, interactive, nontoxic, no acute distress  Throat: Oropharynx moist without lesions  Lungs:   Clear to auscultation bilaterally; no wheezes, rhonchi or rales; respirations unlabored   Heart:  RRR; no murmur, rub or gallop   Abdomen:   Soft, non-tender, non-distended, positive bowel sounds  Extremities: No clubbing, cyanosis or edema   Skin: No rashes or lesions  No draining wounds noted  Diffuse skin desquamation with erythroderma        Labs:    All pertinent labs and imaging studies were personally reviewed  Results from last 7 days   Lab Units 22  0508 22  0539 22  0627   WBC Thousand/uL 2 96* 3 50* 4 33   HEMOGLOBIN g/dL 13 2 12 5 11 8   PLATELETS Thousands/uL 244 224 225     Results from last 7 days   Lab Units 22  0508 22  0539 22  0539 22  0627 22  0627 01/15/22  0510 01/15/22  0510   SODIUM mmol/L 138  --  137  --  140   < > 140   POTASSIUM mmol/L 3 4*   < > 3 5   < > 3 5   < > 3 7   CHLORIDE mmol/L 104   < > 105   < > 108   < > 108   CO2 mmol/L 28   < > 27   < > 27   < > 24   BUN mg/dL 14   < > 11   < > 10   < > 9   CREATININE mg/dL 0 81   < > 0 79   < > 0 78   < > 0 93   EGFR ml/min/1 73sq m 72   < > 74   < > 75   < > 61   CALCIUM mg/dL 9 1   < > 8 9   < > 9 1   < > 8 9   AST U/L 31  --   --   --   --   --  28   ALT U/L 28  --   --   --   --    < > 27   ALK PHOS U/L 58  --   --   --   --    < > 50    < > = values in this interval not displayed  Results from last 7 days   Lab Units 01/20/22  0508 01/19/22  0539 01/15/22  0510 01/14/22  1617   PROCALCITONIN ng/ml 0 07 0 11 0 23 0 22     Results from last 7 days   Lab Units 01/20/22  0508   CRP mg/L 9 7*               Micro:  Results from last 7 days   Lab Units 01/14/22  1616 01/14/22  1615   BLOOD CULTURE  No Growth After 5 Days  No Growth After 5 Days  Imaging:  I have personally reviewed pertinent imaging study reports and images in PACS      CT Chest-no consolidations     Other Studies:   I have personally reviewed pertinent reports

## 2022-01-21 NOTE — RESTORATIVE TECHNICIAN NOTE
Restorative Technician Note      Patient Name: Carter Ruiz     Restorative Tech Visit Date: 01/21/22  Note Type: Mobility (Pt seen per PT request  Pt refused any mobility at this time despite education on the benefits  She refused sitting in bedside chair at this time as well  Pt was informed to press call bell when ready to mobilize )  Patient Position Upon Consult: Supine  Patient Position at End of Consult: Supine;  All needs within reach    James Ang  DPT, Restorative Technician

## 2022-01-21 NOTE — CASE MANAGEMENT
Case Management Discharge Planning Note    Patient name Tere Jama  Location [facility] 807/[facility] 645-61 MRN [id number]  : 1950 Date 2022       Current Admission Date: 1/10/2022  Current Admission Diagnosis:Sepsis [facility])   Patient Active Problem List    Diagnosis Date Noted    COVID-19 2022    New onset a-fib ([facility] ) 01/10/2022    Sepsis ([facility] ) 01/10/2022    Continuous opioid dependence ([place] [address] ) 2021    Hyperparathyroidism ([place] [address] ) 2021    Medicare annual wellness visit, subsequent 2021    Murmur, cardiac 2021    Morbid obesity with BMI of 40 0-44 9, adult ([place] [address] ) 2019    BPPV (benign paroxysmal positional vertigo) 2018    Seasonal allergic rhinitis due to pollen 2018    Erythroderma 10/27/2017    Osteoporosis 2016    Rheumatoid arthritis ([facility] ) 2016    GERD (gastroesophageal reflux disease) 2016    Sarcoid 2016    Morbid obesity ([place] [address] ) 2016    Vitamin D deficiency 2015    Benign essential hypertension 2014    Lumbar radiculopathy 2014      LOS (days): 11  Geometric Mean LOS (GMLOS) (days): 3 50  Days to GMLOS:-7 7     OBJECTIVE:  Risk of Unplanned Readmission Score: 18         Current admission status: Inpatient   Preferred Pharmacy:   [id number] [address] [place] [id number]  Phone: [phone] Fax: [phone]    Primary Care Provider: Daniel Dotson DO    Primary Insurance: [facility] REP  Secondary Insurance:     DISCHARGE DETAILS:      Other Referral/Resources/Interventions Provided:  Referral Comments: Pt's daughter, Delynn Primrose, called CM asking for updates on referrals for pt  She also asked for updates on visitation policies  CM explained that blanket referrals were sent with no accepting beds thus far, as well as the pt currently not being allowed visitors due to being Covid positive    Pt's daughter heidily expressed her dissapointments over visitation policy and not knowing where pt will be going, as evidenced by her shouting into the phone  CM explained that he could not guaruntee where the pt will be going for STR, as well as the accepting facility's visitation policies, because there has not been an accepting bed so far  Pt's daughter demanded that she get an updated list of referrals, which the CM emailed to her at [email or web address]       IMM Given (Date):: 01/21/22  IMM Given to[de-identified] Family  Family notified[de-identified] Daughter Vickey Dao

## 2022-01-21 NOTE — ASSESSMENT & PLAN NOTE
· Patient noted to have diffuse rash on her skin  Skin is dry and peeling  · No definite diagnosis  Has been going on for the past 5 years started after being on IV antibiotics for a dental infection  · Family reported that the diagnosis of psoriasis was ruled out since the patient did not get better while on Humira for rheumatoid arthritis  · Dermatology evaluation appreciated  Erythroderma  Biopsy results show pustular psoriasis  Patient is status post shave biopsy on 01/16 and sent to [facility] for immunofluorescence    Outpatient follow-up with the dermatology  · Wound and rash care per dermatology team

## 2022-01-21 NOTE — ASSESSMENT & PLAN NOTE
· Resolved  · Evolving since admission, Patient with fever and tachycardia  · Send Blood cultures, check UA, CXR negative - chest CT is negative for any acute pathology  CT abdomen is rather unremarkable   · COVID/flu/RSV negative  · procalcitonin now normal   · 1/2 sets of blood culture came back positive for Gram-positive cocci in wdsqrgxz-xqvegfzub-yspyssea Staph likely contaminant  · Urine strep antigen is positive-completed antibiotic course  · Due to recurrent low-grade fever COVID exposure with her daughter being positive and a recent visit by Daughter , COVID swab was checked on 01/19 which came back positive  · Now on remdesivir given immunocompromised state  · Maintaining O2 sats on ambient air  · Continue Eliquis for anticoagulation    · Infectious Disease input noted

## 2022-01-22 PROCEDURE — 99232 SBSQ HOSP IP/OBS MODERATE 35: CPT | Performed by: INTERNAL MEDICINE

## 2022-01-22 RX ADMIN — MICONAZOLE NITRATE: 20 CREAM TOPICAL at 17:25

## 2022-01-22 RX ADMIN — HYDROXYCHLOROQUINE SULFATE 200 MG: 200 TABLET, FILM COATED ORAL at 09:00

## 2022-01-22 RX ADMIN — Medication 1000 UNITS: at 09:22

## 2022-01-22 RX ADMIN — HYDROCHLOROTHIAZIDE 12.5 MG: 12.5 TABLET ORAL at 09:21

## 2022-01-22 RX ADMIN — TRIAMCINOLONE ACETONIDE: 1 OINTMENT TOPICAL at 17:25

## 2022-01-22 RX ADMIN — CLOTRIMAZOLE: 1 CREAM TOPICAL at 17:25

## 2022-01-22 RX ADMIN — METOPROLOL TARTRATE 25 MG: 25 TABLET, FILM COATED ORAL at 20:59

## 2022-01-22 RX ADMIN — PREDNISONE 5 MG: 5 TABLET ORAL at 09:22

## 2022-01-22 RX ADMIN — LIDOCAINE 5% 1 PATCH: 700 PATCH TOPICAL at 09:22

## 2022-01-22 RX ADMIN — PANTOPRAZOLE SODIUM 40 MG: 40 TABLET, DELAYED RELEASE ORAL at 05:04

## 2022-01-22 RX ADMIN — APIXABAN 5 MG: 5 TABLET, FILM COATED ORAL at 09:22

## 2022-01-22 RX ADMIN — HYDROXYCHLOROQUINE SULFATE 200 MG: 200 TABLET, FILM COATED ORAL at 17:28

## 2022-01-22 RX ADMIN — METOPROLOL TARTRATE 25 MG: 25 TABLET, FILM COATED ORAL at 09:21

## 2022-01-22 RX ADMIN — MICONAZOLE NITRATE: 20 CREAM TOPICAL at 09:05

## 2022-01-22 RX ADMIN — APIXABAN 5 MG: 5 TABLET, FILM COATED ORAL at 17:28

## 2022-01-22 RX ADMIN — GUAIFENESIN 600 MG: 600 TABLET, EXTENDED RELEASE ORAL at 09:22

## 2022-01-22 RX ADMIN — REMDESIVIR 100 MG: 100 INJECTION, POWDER, LYOPHILIZED, FOR SOLUTION INTRAVENOUS at 15:01

## 2022-01-22 RX ADMIN — GUAIFENESIN 600 MG: 600 TABLET, EXTENDED RELEASE ORAL at 20:51

## 2022-01-22 RX ADMIN — TRIAMCINOLONE ACETONIDE: 1 OINTMENT TOPICAL at 09:05

## 2022-01-22 RX ADMIN — CLOTRIMAZOLE: 1 CREAM TOPICAL at 09:04

## 2022-01-22 NOTE — PROGRESS NOTES
[address] 1950, 66 y o  female MRN: [id number]  Unit/Bed#: [facility] 807-01 Encounter: [id number]  Primary Care Provider: Flaca Angulo DO   Date and time admitted to hospital: 1/10/2022 10:11 AM    * Sepsis [facility])  Assessment & Plan  · Resolved  · Evolving since admission, Patient with fever and tachycardia  · Send Blood cultures, check UA, CXR negative - chest CT is negative for any acute pathology  CT abdomen is rather unremarkable   · COVID/flu/RSV negative  · procalcitonin now normal   · 1/2 sets of blood culture came back positive for Gram-positive cocci in zotqoikz-iqrkfkije-isknzqsy Staph likely contaminant  · Urine strep antigen is positive-completed antibiotic course  · Due to recurrent low-grade fever COVID exposure with her daughter being positive and a recent visit by Daughter , COVID swab was checked on 01/19 which came back positive  · Now on remdesivir given immunocompromised state  · Maintaining O2 sats on ambient air  · Continue Eliquis for anticoagulation  · Infectious Disease input noted    COVID-19  Assessment & Plan  · Tested positive for COVID on 01/19  Her daughter visited her recently and daughter tested positive in the last 2 days  · Maintaining O2 sats on ambient air  · Placed on mild treatment protocol  · Monitor O2 sats  · Supportive care    New onset a-fib [facility])  Assessment & Plan  · On presentation was in afib RVR with -120s  · After IV lopressor x1, converted to NSR  · Seen by cardio -> cont lopressor 25 BID, continue p o  Eliquis  · Patient will be discharged to rehab      Erythroderma  Assessment & Plan  · Patient noted to have diffuse rash on her skin  Skin is dry and peeling  · No definite diagnosis    Has been going on for the past 5 years started after being on IV antibiotics for a dental infection  · Family reported that the diagnosis of psoriasis was ruled out since the patient did not get better while on Humira for rheumatoid arthritis  · Dermatology evaluation appreciated  Erythroderma  Biopsy results show pustular psoriasis  Patient is status post shave biopsy on  and sent to [facility] for immunofluorescence  Outpatient follow-up with the dermatology  · Wound and rash care per dermatology team       Morbid obesity [facility])  Assessment & Plan  Therapeutic lifestyle modification    Benign essential hypertension  Assessment & Plan  · Monitor blood pressures  · Avoid hypotension    Rheumatoid arthritis (HCC)  Assessment & Plan  · Continue plaquenil, prednisone 5 mg QD            VTE Pharmacologic Prophylaxis:   High Risk (Score >/= 5) - Pharmacological DVT Prophylaxis Ordered: apixaban (Eliquis)  Sequential Compression Devices Ordered  Patient Centered Rounds: I performed bedside rounds with nursing staff today  Discussions with Specialists or Other Care Team Provider:     Education and Discussions with Family / Patient: Discussed with the patient  Time Spent for Care: 30 minutes  More than 50% of total time spent on counseling and coordination of care as described above  Current Length of Stay: 12 day(s)  Current Patient Status: Inpatient   Certification Statement: The patient will continue to require additional inpatient hospital stay due to As outlined  Discharge Plan: Pending placement case management following    Code Status: Level 1 - Full Code    Subjective:     Comfortably in bed  Reports feeling okay  Encouraged out of bed into chair  Discussed with RN  Encourage incentive spirometry    Objective:     Vitals:   Temp (24hrs), Av 8 °F (36 °C), Min:96 7 °F (35 9 °C), Max:96 8 °F (36 °C)    Temp:  [96 7 °F (35 9 °C)-96 8 °F (36 °C)] 96 7 °F (35 9 °C)  HR:  [60-66] 66  Resp:  [16-18] 16  BP: (110-144)/(58-71) 110/58  SpO2:  [95 %-97 %] 95 %  Body mass index is 42 76 kg/m²  Input and Output Summary (last 24 hours):      Intake/Output Summary (Last 24 hours) at 2022 [facility] filed at 1/22/2022 0819  Gross per 24 hour   Intake 0 ml   Output 1900 ml   Net -1900 ml       Physical Exam:   Physical Exam     Comfortably in bed  Obese  Short thick neck  Lungs diminished breath sounds bilateral  No additional sounds   Heart sounds S1-S2 noted  Abdomen soft, nontender  Awake obeys simple commands  Skin excoriation noted        Additional Data:     Labs:  Results from last 7 days   Lab Units 01/20/22  0508   WBC Thousand/uL 2 96*   HEMOGLOBIN g/dL 13 2   HEMATOCRIT % 41 6   PLATELETS Thousands/uL 244   NEUTROS PCT % 48   LYMPHS PCT % 37   MONOS PCT % 12   EOS PCT % 2     Results from last 7 days   Lab Units 01/20/22  0508   SODIUM mmol/L 138   POTASSIUM mmol/L 3 4*   CHLORIDE mmol/L 104   CO2 mmol/L 28   BUN mg/dL 14   CREATININE mg/dL 0 81   ANION GAP mmol/L 6   CALCIUM mg/dL 9 1   ALBUMIN g/dL 2 4*   TOTAL BILIRUBIN mg/dL 0 14*   ALK PHOS U/L 58   ALT U/L 28   AST U/L 31   GLUCOSE RANDOM mg/dL 76                 Results from last 7 days   Lab Units 01/20/22  0508 01/19/22  0539   PROCALCITONIN ng/ml 0 07 0 11       Lines/Drains:  Invasive Devices  Report    Peripheral Intravenous Line            Peripheral IV 01/20/22 Right Forearm 1 day          Drain            External Urinary Catheter 10 days                      Imaging: No pertinent imaging reviewed      Recent Cultures (last 7 days):         Last 24 Hours Medication List:   Current Facility-Administered Medications   Medication Dose Route Frequency Provider Last Rate    acetaminophen  650 mg Oral Q4H PRN Pearl Patterson MD      albuterol  2 puff Inhalation Q4H PRN Elicia Negrete MD      ammonium lactate   Topical BID PRN Elicia Negrete MD      apixaban  5 mg Oral BID Osito Malcolm DO      benzonatate  100 mg Oral TID PRN Elicia Negrete MD      cholecalciferol  1,000 Units Oral Daily Pearl Patterson MD      clotrimazole   Topical BID Pearl Patterson MD      diphenhydrAMINE  25 mg Oral Q6H PRN Toyin Santiago Shantelle Carreon MD      guaiFENesin  600 mg Oral Q12H Susie Falcon, MD      hydrochlorothiazide  12 5 mg Oral Daily Mary Ellen Cardenas MD      hydrocodone-chlorpheniramine polistirex  5 mL Oral Q12H PRN Mary Ellen Cardenas MD      hydroxychloroquine  200 mg Oral BID Marlena Pham MD      lidocaine  1 patch Topical Daily [facility]      metoprolol  5 mg Intravenous Q6H PRN Marlena Pham MD      metoprolol tartrate  25 mg Oral Q12H [facility] [place]      MAGALY ANTIFUNGAL   Topical BID Mary Ellen Cardenas MD      pantoprazole  40 mg Oral Early Morning Marlena Pham MD      predniSONE  5 mg Oral Daily Marlena Pham MD      remdesivir  100 mg Intravenous Q24H Carlton Acosta MD      sodium chloride  1 spray Each Nare Q1H PRN Ady Monroy MD      sodium chloride (PF)  3 mL Intravenous Q1H PRN Marlena Pham MD      triamcinolone   Topical BID Mary Ellen Cardenas MD          Today, Patient Was Seen By: London Fernandes MD    **Please Note: This note may have been constructed using a voice recognition system  **

## 2022-01-22 NOTE — ASSESSMENT & PLAN NOTE
· Patient noted to have diffuse rash on her skin  Skin is dry and peeling  · No definite diagnosis  Has been going on for the past 5 years started after being on IV antibiotics for a dental infection  · Family reported that the diagnosis of psoriasis was ruled out since the patient did not get better while on Humira for rheumatoid arthritis  · Dermatology evaluation appreciated  Erythroderma  Biopsy results show pustular psoriasis  Patient is status post shave biopsy on 01/16 and sent to [facility] for immunofluorescence    Outpatient follow-up with the dermatology  · Wound and rash care per dermatology team

## 2022-01-22 NOTE — ASSESSMENT & PLAN NOTE
· Resolved  · Evolving since admission, Patient with fever and tachycardia  · Send Blood cultures, check UA, CXR negative - chest CT is negative for any acute pathology  CT abdomen is rather unremarkable   · COVID/flu/RSV negative  · procalcitonin now normal   · 1/2 sets of blood culture came back positive for Gram-positive cocci in kxkrnxth-sihuoxnts-tkcwpdbk Staph likely contaminant  · Urine strep antigen is positive-completed antibiotic course  · Due to recurrent low-grade fever COVID exposure with her daughter being positive and a recent visit by Daughter , COVID swab was checked on 01/19 which came back positive  · Now on remdesivir given immunocompromised state  · Maintaining O2 sats on ambient air  · Continue Eliquis for anticoagulation    · Infectious Disease input noted

## 2022-01-23 PROCEDURE — 99232 SBSQ HOSP IP/OBS MODERATE 35: CPT | Performed by: INTERNAL MEDICINE

## 2022-01-23 RX ADMIN — CLOTRIMAZOLE: 1 CREAM TOPICAL at 18:00

## 2022-01-23 RX ADMIN — GUAIFENESIN 600 MG: 600 TABLET, EXTENDED RELEASE ORAL at 09:24

## 2022-01-23 RX ADMIN — REMDESIVIR 100 MG: 100 INJECTION, POWDER, LYOPHILIZED, FOR SOLUTION INTRAVENOUS at 14:44

## 2022-01-23 RX ADMIN — PREDNISONE 5 MG: 5 TABLET ORAL at 09:24

## 2022-01-23 RX ADMIN — HYDROCHLOROTHIAZIDE 12.5 MG: 12.5 TABLET ORAL at 09:24

## 2022-01-23 RX ADMIN — METOPROLOL TARTRATE 25 MG: 25 TABLET, FILM COATED ORAL at 09:24

## 2022-01-23 RX ADMIN — MICONAZOLE NITRATE: 20 CREAM TOPICAL at 09:36

## 2022-01-23 RX ADMIN — APIXABAN 5 MG: 5 TABLET, FILM COATED ORAL at 09:24

## 2022-01-23 RX ADMIN — HYDROXYCHLOROQUINE SULFATE 200 MG: 200 TABLET, FILM COATED ORAL at 09:26

## 2022-01-23 RX ADMIN — METOPROLOL TARTRATE 25 MG: 25 TABLET, FILM COATED ORAL at 20:28

## 2022-01-23 RX ADMIN — CLOTRIMAZOLE: 1 CREAM TOPICAL at 09:27

## 2022-01-23 RX ADMIN — HYDROXYCHLOROQUINE SULFATE 200 MG: 200 TABLET, FILM COATED ORAL at 17:47

## 2022-01-23 RX ADMIN — APIXABAN 5 MG: 5 TABLET, FILM COATED ORAL at 17:46

## 2022-01-23 RX ADMIN — TRIAMCINOLONE ACETONIDE: 1 OINTMENT TOPICAL at 18:00

## 2022-01-23 RX ADMIN — TRIAMCINOLONE ACETONIDE: 1 OINTMENT TOPICAL at 09:36

## 2022-01-23 RX ADMIN — PANTOPRAZOLE SODIUM 40 MG: 40 TABLET, DELAYED RELEASE ORAL at 05:37

## 2022-01-23 RX ADMIN — GUAIFENESIN 600 MG: 600 TABLET, EXTENDED RELEASE ORAL at 20:20

## 2022-01-23 RX ADMIN — LIDOCAINE 5% 1 PATCH: 700 PATCH TOPICAL at 09:24

## 2022-01-23 RX ADMIN — MICONAZOLE NITRATE: 20 CREAM TOPICAL at 18:00

## 2022-01-23 RX ADMIN — Medication 1000 UNITS: at 09:24

## 2022-01-23 NOTE — PROGRESS NOTES
[address] 1950, 66 y o  female MRN: [id number]  Unit/Bed#: [facility] 807-01 Encounter: [id number]  Primary Care Provider: Julianne Torres DO   Date and time admitted to hospital: 1/10/2022 10:11 AM    * Sepsis [facility])  Assessment & Plan  · Resolved  · Evolving since admission, Patient with fever and tachycardia  · Send Blood cultures, check UA, CXR negative - chest CT is negative for any acute pathology  CT abdomen is rather unremarkable   · COVID/flu/RSV negative  · procalcitonin now normal   · 1/2 sets of blood culture came back positive for Gram-positive cocci in djeaafcw-lueveolxr-doxsifqu Staph likely contaminant  · Urine strep antigen is positive-completed antibiotic course  · Due to recurrent low-grade fever COVID exposure with her daughter being positive and a recent visit by Daughter , COVID swab was checked on 01/19 which came back positive  · Now on remdesivir given immunocompromised state  · Maintaining O2 sats on ambient air  · Continue Eliquis for anticoagulation  · Infectious Disease input noted    COVID-19  Assessment & Plan  · Tested positive for COVID on 01/19  Her daughter visited her recently and daughter tested positive in the last 2 days  · Maintaining O2 sats on ambient air  · Placed on mild treatment protocol  · Monitor O2 sats  · Supportive care    New onset a-fib [facility])  Assessment & Plan  · On presentation was in afib RVR with -120s  · After IV lopressor x1, converted to NSR  · Seen by cardio -> cont lopressor 25 BID, continue p o  Eliquis  · Patient will be discharged to rehab      Erythroderma  Assessment & Plan  · Patient noted to have diffuse rash on her skin  Skin is dry and peeling  · No definite diagnosis    Has been going on for the past 5 years started after being on IV antibiotics for a dental infection  · Family reported that the diagnosis of psoriasis was ruled out since the patient did not get better while on Humira for rheumatoid arthritis  · Dermatology evaluation appreciated  Erythroderma  Biopsy results show pustular psoriasis  Patient is status post shave biopsy on  and sent to [facility] for immunofluorescence  Outpatient follow-up with the dermatology  · Wound and rash care per dermatology team       Morbid obesity [facility])  Assessment & Plan  Therapeutic lifestyle modification    Benign essential hypertension  Assessment & Plan  · Monitor blood pressures  · Avoid hypotension    Rheumatoid arthritis (HCC)  Assessment & Plan  · Continue plaquenil, prednisone 5 mg QD          VTE Pharmacologic Prophylaxis:   High Risk (Score >/= 5) - Pharmacological DVT Prophylaxis Ordered: apixaban (Eliquis)  Sequential Compression Devices Ordered  Patient Centered Rounds: I performed bedside rounds with nursing staff today  Discussions with Specialists or Other Care Team Provider:     Education and Discussions with Family / Patient: discussed with the patient, called and updated daughter Margarita Gutierrez in detail questions answered  Time Spent for Care: 30 minutes  More than 50% of total time spent on counseling and coordination of care as described above  Current Length of Stay: 13 day(s)  Current Patient Status: Inpatient   Certification Statement: The patient will continue to require additional inpatient hospital stay due to as outlined  Discharge Plan: pending placement - case management following     Code Status: Level 1 - Full Code    Subjective:     Comfortably in bed  Reports feeling better  Improving appetite  Encouraged out of bed into chair  Encouraged incentive spirometry  Discussed with RN    Objective:     Vitals:   Temp (24hrs), Av 4 °F (36 9 °C), Min:98 1 °F (36 7 °C), Max:98 6 °F (37 °C)    Temp:  [98 1 °F (36 7 °C)-98 6 °F (37 °C)] 98 1 °F (36 7 °C)  HR:  [61-66] 61  Resp:  [16-20] 16  BP: (123-128)/(52-59) 123/59  SpO2:  [95 %-97 %] 97 %  Body mass index is 42 76 kg/m²       Input and Output Summary (last 24 hours): Intake/Output Summary (Last 24 hours) at 1/23/2022 1235  Last data filed at 1/23/2022 0542  Gross per 24 hour   Intake 620 ml   Output 975 ml   Net -355 ml       Physical Exam:   Physical Exam     Comfortably in bed  Obese  Short thick neck  Lungs diminished breath sounds bilaterally  No additional sounds  Heart sounds S1-S2 noted distant  Abdomen soft  Abdominal obesity noted  Awake obeys simple commands  Excoriation skin lesions noted - appears slightly improved  No pedal edema    Additional Data:     Labs:  Results from last 7 days   Lab Units 01/20/22  0508   WBC Thousand/uL 2 96*   HEMOGLOBIN g/dL 13 2   HEMATOCRIT % 41 6   PLATELETS Thousands/uL 244   NEUTROS PCT % 48   LYMPHS PCT % 37   MONOS PCT % 12   EOS PCT % 2     Results from last 7 days   Lab Units 01/20/22  0508   SODIUM mmol/L 138   POTASSIUM mmol/L 3 4*   CHLORIDE mmol/L 104   CO2 mmol/L 28   BUN mg/dL 14   CREATININE mg/dL 0 81   ANION GAP mmol/L 6   CALCIUM mg/dL 9 1   ALBUMIN g/dL 2 4*   TOTAL BILIRUBIN mg/dL 0 14*   ALK PHOS U/L 58   ALT U/L 28   AST U/L 31   GLUCOSE RANDOM mg/dL 76                 Results from last 7 days   Lab Units 01/20/22  0508 01/19/22  0539   PROCALCITONIN ng/ml 0 07 0 11       Lines/Drains:  Invasive Devices  Report    Peripheral Intravenous Line            Peripheral IV 01/20/22 Right Forearm 2 days          Drain            External Urinary Catheter 11 days                      Imaging: No pertinent imaging reviewed      Recent Cultures (last 7 days):         Last 24 Hours Medication List:   Current Facility-Administered Medications   Medication Dose Route Frequency Provider Last Rate    acetaminophen  650 mg Oral Q4H PRN Viktoriya Pena MD      albuterol  2 puff Inhalation Q4H PRN Hong Reyna MD      ammonium lactate   Topical BID PRN Hong Reyna MD      apixaban  5 mg Oral BID Eloy Mosley DO      benzonatate  100 mg Oral TID PRN Hong Reyna MD     [facility] cholecalciferol  1,000 Units Oral Daily Henny Stauffer MD      clotrimazole   Topical BID Henny Stauffer MD      diphenhydrAMINE  25 mg Oral Q6H PRN Ishmael Bolanos MD      guaiFENesin  600 mg Oral Q12H Nathaly De Leon MD      hydrochlorothiazide  12 5 mg Oral Daily Ishmael Bolanos MD      hydrocodone-chlorpheniramine polistirex  5 mL Oral Q12H PRN Ishmael Bolanos MD      hydroxychloroquine  200 mg Oral BID Henny Stauffer MD      lidocaine  1 patch Topical Daily Von Boudreaux PA-C      metoprolol  5 mg Intravenous Q6H PRN Henny Stauffer MD      metoprolol tartrate  25 mg Oral Q12H [address] [place]      MAGALY ANTIFUNGAL   Topical BID Ishmael Bolanos MD      pantoprazole  40 mg Oral Early Morning Henny Stauffer MD      predniSONE  5 mg Oral Daily Henny Stauffer MD      remdesivir  100 mg Intravenous Q24H Esther Smiley MD      sodium chloride  1 spray Each Nare Q1H PRN Sara Serna MD      sodium chloride (PF)  3 mL Intravenous Q1H PRN Henny Stauffer MD      triamcinolone   Topical BID Ishmael Bolanos MD          Today, Patient Was Seen By: Mina Kong MD    **Please Note: This note may have been constructed using a voice recognition system  **

## 2022-01-23 NOTE — CASE MANAGEMENT
Case Management Discharge Planning Note    Patient name Heather Speaker  Location [facility] 807/[facility] 062-94 MRN [id number]  : 1950 Date 2022       Current Admission Date: 1/10/2022  Current Admission Diagnosis:Sepsis [facility])   Patient Active Problem List    Diagnosis Date Noted    COVID-19 2022    [facility] 01/10/2022    Sepsis ([facility] ) 01/10/2022    Continuous opioid dependence ([place] [address] ) 2021    Hyperparathyroidism ([place] [address] ) 2021    Medicare annual wellness visit, subsequent 2021    Murmur, cardiac 2021    Morbid obesity with BMI of 40 0-44 9, adult ([place] [address] ) 2019    BPPV (benign paroxysmal positional vertigo) 2018    Seasonal allergic rhinitis due to pollen 2018    Erythroderma 10/27/2017    Osteoporosis 2016    Rheumat[facility] 2016    GERD (gastroesophageal reflux disease) 2016    Sarcoid 2016    Morbid obesity ([place] [address] ) 2016    Vitamin D deficiency 2015    Benign essential hypertension 2014    Lumbar radiculopathy 2014      LOS (days): 13  Geometric Mean LOS (GMLOS) (days): 3 50  Days to GMLOS:-9 5     OBJECTIVE:  Risk of Unplanned Readmission Score: 19         Current admission status: Inpatient   Preferred Pharmacy:   [facility]d [facility] [address] [facility] Alexandra Lopez [address]  Phone: [phone] Fax: [phone]    Primary Care Provider: Julianne Torres DO    Primary Insurance: Jovita Meza [facility]  Secondary Insurance:     DISCHARGE DETAILS:    Other Referral/Resources/Interventions Provided:  Interventions: [facility]  Referral Comments: Placed updates on [facility] to [facility], [facility] and [facility], pt is dc ready not on antibiotis and will not need anitbiotic per physican DR Justino Luong  Pt is covid + and needs a covid + bed  Possibility at [address] [place]  Attempted to call pt in her room to update her but no answer   called pt's daughter Cecelia Estes at [phone] and updated her with STR search  Daughter verbalized understanding of the process and asked to be kept updated

## 2022-01-23 NOTE — ASSESSMENT & PLAN NOTE
· Resolved  · Evolving since admission, Patient with fever and tachycardia  · Send Blood cultures, check UA, CXR negative - chest CT is negative for any acute pathology  CT abdomen is rather unremarkable   · COVID/flu/RSV negative  · procalcitonin now normal   · 1/2 sets of blood culture came back positive for Gram-positive cocci in pnygeizr-ywtcaikig-nooggpov Staph likely contaminant  · Urine strep antigen is positive-completed antibiotic course  · Due to recurrent low-grade fever COVID exposure with her daughter being positive and a recent visit by Daughter , COVID swab was checked on 01/19 which came back positive  · Now on remdesivir given immunocompromised state  · Maintaining O2 sats on ambient air  · Continue Eliquis for anticoagulation    · Infectious Disease input noted

## 2022-01-23 NOTE — ASSESSMENT & PLAN NOTE
· Patient noted to have diffuse rash on her skin  Skin is dry and peeling  · No definite diagnosis  Has been going on for the past 5 years started after being on IV antibiotics for a dental infection  · Family reported that the diagnosis of psoriasis was ruled out since the patient did not get better while on Humira for rheumatoid arthritis  · Dermatology evaluation appreciated  Erythroderma  Biopsy results show pustular psoriasis  Patient is status post shave biopsy on 01/16 and sent to [facility] for immunofluorescence    Outpatient follow-up with the dermatology  · Wound and rash care per dermatology team

## 2022-01-24 PROCEDURE — 97530 THERAPEUTIC ACTIVITIES: CPT

## 2022-01-24 PROCEDURE — 97110 THERAPEUTIC EXERCISES: CPT

## 2022-01-24 PROCEDURE — 97116 GAIT TRAINING THERAPY: CPT

## 2022-01-24 PROCEDURE — 99232 SBSQ HOSP IP/OBS MODERATE 35: CPT | Performed by: INTERNAL MEDICINE

## 2022-01-24 PROCEDURE — 99232 SBSQ HOSP IP/OBS MODERATE 35: CPT | Performed by: STUDENT IN AN ORGANIZED HEALTH CARE EDUCATION/TRAINING PROGRAM

## 2022-01-24 PROCEDURE — 97535 SELF CARE MNGMENT TRAINING: CPT

## 2022-01-24 PROCEDURE — 92526 ORAL FUNCTION THERAPY: CPT

## 2022-01-24 RX ADMIN — TRIAMCINOLONE ACETONIDE: 1 OINTMENT TOPICAL at 09:00

## 2022-01-24 RX ADMIN — TRIAMCINOLONE ACETONIDE: 1 OINTMENT TOPICAL at 17:53

## 2022-01-24 RX ADMIN — MICONAZOLE NITRATE: 20 CREAM TOPICAL at 09:00

## 2022-01-24 RX ADMIN — Medication 1000 UNITS: at 09:37

## 2022-01-24 RX ADMIN — CLOTRIMAZOLE: 1 CREAM TOPICAL at 09:00

## 2022-01-24 RX ADMIN — GUAIFENESIN 600 MG: 600 TABLET, EXTENDED RELEASE ORAL at 09:37

## 2022-01-24 RX ADMIN — GUAIFENESIN 600 MG: 600 TABLET, EXTENDED RELEASE ORAL at 21:09

## 2022-01-24 RX ADMIN — METOPROLOL TARTRATE 25 MG: 25 TABLET, FILM COATED ORAL at 21:09

## 2022-01-24 RX ADMIN — HYDROXYCHLOROQUINE SULFATE 200 MG: 200 TABLET, FILM COATED ORAL at 17:53

## 2022-01-24 RX ADMIN — MICONAZOLE NITRATE: 20 CREAM TOPICAL at 17:53

## 2022-01-24 RX ADMIN — CLOTRIMAZOLE: 1 CREAM TOPICAL at 17:53

## 2022-01-24 RX ADMIN — PREDNISONE 5 MG: 5 TABLET ORAL at 09:37

## 2022-01-24 RX ADMIN — HYDROCHLOROTHIAZIDE 12.5 MG: 12.5 TABLET ORAL at 09:37

## 2022-01-24 RX ADMIN — METOPROLOL TARTRATE 25 MG: 25 TABLET, FILM COATED ORAL at 09:37

## 2022-01-24 RX ADMIN — HYDROXYCHLOROQUINE SULFATE 200 MG: 200 TABLET, FILM COATED ORAL at 09:38

## 2022-01-24 RX ADMIN — LIDOCAINE 5% 1 PATCH: 700 PATCH TOPICAL at 09:37

## 2022-01-24 RX ADMIN — PANTOPRAZOLE SODIUM 40 MG: 40 TABLET, DELAYED RELEASE ORAL at 05:26

## 2022-01-24 RX ADMIN — APIXABAN 5 MG: 5 TABLET, FILM COATED ORAL at 17:52

## 2022-01-24 RX ADMIN — APIXABAN 5 MG: 5 TABLET, FILM COATED ORAL at 09:37

## 2022-01-24 NOTE — PHYSICAL THERAPY NOTE
Physical Therapy Treatment Note    Patient's Name: Jeanine Hunter  : 22 1052   PT Last Visit   PT Visit Date 22   Note Type   Note Type Treatment for insurance authorization   Pain Assessment   Pain Assessment Tool 0-10   Pain Score No Pain   Restrictions/Precautions   Weight Bearing Precautions Per Order No   Other Precautions Airborne/isolation; Chair Alarm; Bed Alarm; Fall Risk  (COVID-19(+))   General   Chart Reviewed Yes   Response to Previous Treatment Patient with no complaints from previous session  Family/Caregiver Present No   Subjective   Subjective Pt agreeable to mobilize, reported fatigue w/ today's session but denied SOB throughout  Pt reported her goal is to be able to get on/off toilet w/ nursing staff      Bed Mobility   Supine to Sit 4  Minimal assistance   Additional items Assist x 1; Increased time required;Verbal cues   Sit to Supine Unable to assess   Additional Comments Pt greeted in supine  Transfers   Sit to Stand 3  Moderate assistance   Additional items Assist x 1; Increased time required;Verbal cues;Armrests   Stand to Sit 4  Minimal assistance   Additional items Assist x 1; Armrests; Increased time required;Verbal cues   Stand pivot 4  Minimal assistance   Additional items Assist x 1; Increased time required;Verbal cues   Toilet transfer 3  Moderate assistance   Additional items Assist x 2; Increased time required;Verbal cues;Standard toilet  (grab bar)   Additional Comments RW   Ambulation/Elevation   Gait pattern Excessively slow; Short stride;Decreased foot clearance   Gait Assistance 4  Minimal assist   Additional items Assist x 1;Verbal cues   Assistive Device Rolling walker   Distance 10'x3   Balance   Static Sitting Fair +   Dynamic Sitting Fair   Static Standing Fair -  (once assisted to adjust to fully upright standing)   Dynamic Standing Poor +   Ambulatory Poor +  (RW)   Endurance Deficit   Endurance Deficit Yes   Endurance Deficit Description weakness, fatigue   Activity Tolerance   Activity Tolerance Patient limited by fatigue   Medical Staff Made Aware OT [facility]   Nurse Made Aware yes - cleared pt for PT   Exercises   Hip Abduction Sitting;20 reps;AROM; Bilateral   Hip Adduction Sitting;20 reps;AROM; Bilateral   Knee AROM Long Arc Quad Sitting;20 reps;AROM; Bilateral   Marching Sitting;20 reps;AROM; Bilateral   Assessment   Prognosis Good   Problem List Decreased strength;Decreased endurance; Impaired balance;Decreased mobility; Decreased safety awareness; Obesity; Decreased skin integrity   Assessment Pt seen for PT session w/ focus on HEP instruction, gait training w/ RW, + transfers  Pt w/ slow but steady progress this session as she required decreased assistance for sup>sit t/f and ambulation  Pt w/ improved ability to push up from chair this session during transfers, but continues to demonstrate posterior lean requiring cues + ModAx1 to achieve fully upright standing position  Pt reported fatigue following toilet transfer  Max encouragement to participate in further ambulation training  From a PT standpoint continue to recommend rehab  Barriers to Discharge Inaccessible home environment;Decreased caregiver support   Goals   Patient Goals go home   PT Treatment Day 4   Plan   Treatment/Interventions Functional transfer training;LE strengthening/ROM; Therapeutic exercise; Endurance training;Patient/family training;Equipment eval/education; Bed mobility;Gait training; Compensatory technique education;Spoke to nursing;OT;Spoke to case management   Progress Progressing toward goals   PT Frequency 3-5x/wk   Recommendation   PT Discharge Recommendation Post acute rehabilitation services   AM-PAC Basic Mobility Inpatient   Turning in Bed Without Bedrails 3   Lying on Back to Sitting on Edge of Flat Bed 3   Moving Bed to Chair 3   Standing Up From Chair 2   Walk in Room 3   Climb 3-5 Stairs 1   Basic Mobility Inpatient Raw Score 15   Basic Mobility Standardized Score 36 97 Highest Level Of Mobility   -HLM Goal 4: Move to [facility] Provided Mobility training;Home exercise program;Assistive device   Patient Demonstrates acceptance/verbal understanding;Reinforcement needed   End of Consult   Patient Position at End of Consult Bedside chair;Bed/Chair alarm activated; All needs within reach  (Lorrine Fear in place, handed off to OT to finish session)     Ayden Shaw, PT, DPT

## 2022-01-24 NOTE — PLAN OF CARE
Problem: OCCUPATIONAL THERAPY ADULT  Goal: Performs self-care activities at highest level of function for planned discharge setting  See evaluation for individualized goals  Description: Treatment Interventions: ADL retraining,Functional transfer training,UE strengthening/ROM,Endurance training,Cognitive reorientation,Patient/family training,Equipment evaluation/education,Compensatory technique education,Energy conservation,Activityengagement  Equipment Recommended: Bedside commode       See flowsheet documentation for full assessment, interventions and recommendations  Outcome: Progressing  Note: Limitation: Decreased ADL status,Decreased UE strength,Decreased cognition,Decreased endurance,Decreased self-care trans,Decreased high-level ADLs,Decreased fine motor control  Prognosis: Good  Assessment: Pt greeted bedside for OT treatment on 1/24/2022 focusing on maximizing independence with ADLs and functional cognition  Pt S with eating and max A with toileting routine  Pt mod A with functional STS transfers and mod Ax2 with toilet transfers (STD toilet and use of GB)  Pt completes functional mobility within room distances with modA x1 with use of RW  Teachback technique utilized during OT session to maximize understanding and independence  Limitations that impact functional performance include decreased ADL status, decreased UE ROM, decreased UE strength, decreased safe judgement during ADLs, decreased cognition, decreased endurance, decreased self care transfers, decreased high level ADLs and pain  Occupational performance areas to address ADL retraining, functional transfer training, UE strengthening/ROM, endurance training, cognitive reorientation, Pt/caregiver education, equipment evaluation/education, compensatory technique education, energy conservation and activity engagement   Pt would benefit from continued skilled OT services while in hospital to maximize independence with ADLs   Will continue to follow Pt's goals and progress  Pt would benefit from post acute rehabilitation services upon DC to maximize safety and independence with ADLs and functional tasks of choice  OT Discharge Recommendation: Post acute rehabilitation services  OT - OK to Discharge:  Yes

## 2022-01-24 NOTE — PROGRESS NOTES
[address] 1950, 66 y o  female MRN: [id number]  Unit/Bed#: [facility] 807-01 Encounter: [id number]  Primary Care Provider: Leslie Stephenson DO   Date and time admitted to hospital: 1/10/2022 10:11 AM    * Sepsis [facility])  Assessment & Plan  · Resolved  · Evolving since admission, Patient with fever and tachycardia  · Send Blood cultures, check UA, CXR negative - chest CT is negative for any acute pathology  CT abdomen is rather unremarkable   · COVID/flu/RSV negative  · procalcitonin now normal   · 1/2 sets of blood culture came back positive for Gram-positive cocci in flyrjmdh-cewirfzpm-lyldlxge Staph likely contaminant  · Urine strep antigen is positive-completed antibiotic course  · Due to recurrent low-grade fever COVID exposure with her daughter being positive and a recent visit by Daughter , COVID swab was checked on 01/19 which came back positive  · Now on remdesivir given immunocompromised state  · Maintaining O2 sats on ambient air  · Continue Eliquis for anticoagulation  · Infectious Disease input noted    COVID-19  Assessment & Plan  · Tested positive for COVID on 01/19  Her daughter visited her recently and daughter tested positive in the last 2 days  · Maintaining O2 sats on ambient air  · Placed on mild treatment protocol  · Monitor O2 sats  · Supportive care    New onset a-fib [facility])  Assessment & Plan  · On presentation was in afib RVR with -120s  · After IV lopressor x1, converted to NSR  · Seen by cardio -> cont lopressor 25 BID, continue p o  Eliquis  · Patient will be discharged to rehab      Erythroderma  Assessment & Plan  · Patient noted to have diffuse rash on her skin  Skin is dry and peeling  · No definite diagnosis    Has been going on for the past 5 years started after being on IV antibiotics for a dental infection  · Family reported that the diagnosis of psoriasis was ruled out since the patient did not get better while on Humira for rheumatoid arthritis  · Dermatology evaluation appreciated  Erythroderma  Biopsy results show pustular psoriasis  Patient is status post shave biopsy on  and sent to [facility] immunofluorescence  Outpatient follow-up with the dermatology  · Wound and rash care per dermatology team       Morbid obesity [facility])  Assessment & Plan  Therapeutic lifestyle modification    Benign essential hypertension  Assessment & Plan  · Monitor blood pressures  · Avoid hypotension    Rheumatoid arthritis (HCC)  Assessment & Plan  · Continue plaquenil, prednisone 5 mg QD            VTE Pharmacologic Prophylaxis:   High Risk (Score >/= 5) - Pharmacological DVT Prophylaxis Ordered: apixaban (Eliquis)  Sequential Compression Devices Ordered  Patient Centered Rounds: I performed bedside rounds with nursing staff today  Discussions with Specialists or Other Care Team Provider:     Education and Discussions with Family / Patient: Discussed with the patient, updated daughter in detail, questions answered  Time Spent for Care: 30 minutes  More than 50% of total time spent on counseling and coordination of care as described above  Current Length of Stay: 14 day(s)  Current Patient Status: Inpatient   Certification Statement: The patient will continue to require additional inpatient hospital stay due to As outlined  Discharge Plan: Pending placement case management following      Code Status: Level 1 - Full Code    Subjective:     Comfortably sitting up in chair   Appetite fair   Encouraged incentive spirometry    Objective:     Vitals:   Temp (24hrs), Av 3 °F (36 8 °C), Min:98 °F (36 7 °C), Max:98 6 °F (37 °C)    Temp:  [98 °F (36 7 °C)-98 6 °F (37 °C)] 98 6 °F (37 °C)  HR:  [63-68] 64  Resp:  [18] 18  BP: (129-144)/(56-97) 144/56  SpO2:  [96 %-97 %] 97 %  Body mass index is 42 76 kg/m²  Input and Output Summary (last 24 hours):      Intake/Output Summary (Last 24 hours) at 2022 [place] filed at 1/24/2022 1300  Gross per 24 hour   Intake 910 ml   Output 1825 ml   Net -915 ml       Physical Exam:   Physical Exam     Sitting up in chair  Obese  Short thick neck  Lungs diminished breath sounds bilaterally  Heart sounds S1-S2 noted distant  Abdomen soft  Abdominal obesity noted  Awake obeys simple commands  No pedal edema  Excoriation rash improving    Additional Data:     Labs:  Results from last 7 days   Lab Units 01/20/22  0508   WBC Thousand/uL 2 96*   HEMOGLOBIN g/dL 13 2   HEMATOCRIT % 41 6   PLATELETS Thousands/uL 244   NEUTROS PCT % 48   LYMPHS PCT % 37   MONOS PCT % 12   EOS PCT % 2     Results from last 7 days   Lab Units 01/20/22  0508   SODIUM mmol/L 138   POTASSIUM mmol/L 3 4*   CHLORIDE mmol/L 104   CO2 mmol/L 28   BUN mg/dL 14   CREATININE mg/dL 0 81   ANION GAP mmol/L 6   CALCIUM mg/dL 9 1   ALBUMIN g/dL 2 4*   TOTAL BILIRUBIN mg/dL 0 14*   ALK PHOS U/L 58   ALT U/L 28   AST U/L 31   GLUCOSE RANDOM mg/dL 76                 Results from last 7 days   Lab Units 01/20/22  0508 01/19/22  0539   PROCALCITONIN ng/ml 0 07 0 11       Lines/Drains:  Invasive Devices  Report    Peripheral Intravenous Line            Peripheral IV 01/20/22 Right Forearm 3 days          Drain            External Urinary Catheter 12 days                      Imaging: No pertinent imaging reviewed      Recent Cultures (last 7 days):         Last 24 Hours Medication List:   Current Facility-Administered Medications   Medication Dose Route Frequency Provider Last Rate    acetaminophen  650 mg Oral Q4H PRN Chioma Stone MD      albuterol  2 puff Inhalation Q4H PRN Tayler Estrada MD      ammonium lactate   Topical BID PRN Tayler Estrada MD      apixaban  5 mg Oral BID Yvan Alvin, DO      benzonatate  100 mg Oral TID PRN Tayler Estrada MD      cholecalciferol  1,000 Units Oral Daily Chioma Stone MD      clotrimazole   Topical BID Chioma Stone MD      diphenhydrAMINE  25 mg Oral Q6H PRN Hermila Marquez MD      guaiFENesin  600 mg Oral Q12H [address] Hermila Marquez MD      hydrochlorothiazide  12 5 mg Oral Daily Hermila Marquez MD      hydrocodone-chlorpheniramine polistirex  5 mL Oral Q12H PRN Hermila Marquez MD      hydroxychloroquine  200 mg Oral BID Agnes Meredith MD      lidocaine  1 patch Topical Daily [facility]      metoprolol  5 mg Intravenous Q6H PRN Agnes Meredith MD      metoprolol tartrate  25 mg Oral Q12H [address] [place]      MAGALY ANTIFUNGAL   Topical BID Hermila Marquez MD      pantoprazole  40 mg Oral Early Morning Agnes Meredith MD      predniSONE  5 mg Oral Daily Agnes Meredith MD      sodium chloride  1 spray Each Nare Q1H PRN Austyn Lui MD      sodium chloride (PF)  3 mL Intravenous Q1H PRN Agnes Meredith MD      triamcinolone   Topical BID Hermila Marquez MD          Today, Patient Was Seen By: Samir Pickens MD    **Please Note: This note may have been constructed using a voice recognition system  **

## 2022-01-24 NOTE — OCCUPATIONAL THERAPY NOTE
Occupational Therapy Progress Note     Patient Name: Arleen Baeza  [id number] Date: 1/24/2022  Problem List  Principal Problem:    Sepsis ([facility] )  Active Problems:    Rheumatoid arthritis ([facility] )    GERD (gastroesophageal reflux disease)    Benign essential hypertension    Morbid obesity (HCC)    Erythroderma    Murmur, cardiac    New onset a-fib ([facility] )    COVID-19              01/24/22 1057   OT Last Visit   OT Visit Date 01/24/22   Note Type   Note Type Treatment for insurance authorization   Restrictions/Precautions   Weight Bearing Precautions Per Order No   Other Precautions Fall Risk;Pain;Contact/isolation; Airborne/isolation; Chair Alarm; Bed Alarm;Cognitive  (COVID +)   Lifestyle   Autonomy At baseline pt was completing ADLs IND, IADLs with assist, ambulating MOD IND with cane, (-) driving (van transportation)  Reciprocal Relationships supportive family   Service to Others retired   [address] enjoys watching TV   Pain Assessment   Pain Assessment Tool 0-10   Pain Score No Pain   ADL   Where Assessed Sitting at sink   Eating Assistance 5  Supervision/Setup   Eating Deficit Increased time to complete; Beverage management;Setup   Eating Comments Able to reach for beverage on bedside table at S level   Toileting Assistance  2  Maximal Assistance   Toileting Deficit Setup;Verbal cueing;Supervison/safety; Increased time to complete;Grab bar use;Clothing management up;Clothing management down   Toileting Comments Pt mod Ax2 with toilet transfer with use of GB/STD toilet  Pt max A with hygiene and max A with clothing management  Functional Standing Tolerance   Time x2 min   Activity Hygiene toileting with RW   Bed Mobility   Additional Comments Pt greeted sitting on EOB with PT and left OOB in recliner chair at end of session  All needs met, call bell nearby, and chair alarm engaged  Transfers   Sit to Stand 3  Moderate assistance   Additional items Assist x 1; Increased time required;Verbal cues   Stand to Sit 3  Moderate assistance   Additional items Assist x 1; Increased time required;Verbal cues   Stand pivot 4  Minimal assistance   Additional items Assist x 1; Increased time required;Verbal cues   Toilet transfer 3  Moderate assistance   Additional items Assist x 2; Increased time required;Verbal cues;Standard toilet; Other  (GB)   Additional Comments with RW  Pt requires VCs/TCs for safety and requires demonstration/teachback in order to maximize understanding  Functional Mobility   Functional Mobility 3  Moderate assistance   Additional Comments Within room distances  Additional items Rolling walker   Cognition   Overall Cognitive Status WFL   Arousal/Participation Alert; Cooperative   Attention Attends with cues to redirect   Orientation Level Oriented X4   Memory Decreased recall of precautions;Decreased short term memory   Following Commands Follows one step commands without difficulty   Comments Pt pleasant and cooperative during OT session  Teachback utilized during OT session to maximize understanding and independence  Activity Tolerance   Activity Tolerance Patient limited by fatigue   Medical Staff Made Aware Portions of treatment completed with NABEEL Hopper 2* to Pt's medical complexity and decreased endurance  OT focus on ADLs  Assessment   Assessment Pt greeted bedside for OT treatment on 1/24/2022 focusing on maximizing independence with ADLs and functional cognition  Pt S with eating and max A with toileting routine  Pt mod A with functional STS transfers and mod Ax2 with toilet transfers (STD toilet and use of GB)  Pt completes functional mobility within room distances with modA x1 with use of RW  Teachback technique utilized during OT session to maximize understanding and independence   Limitations that impact functional performance include decreased ADL status, decreased UE ROM, decreased UE strength, decreased safe judgement during ADLs, decreased cognition, decreased endurance, decreased self care transfers, decreased high level ADLs and pain  Occupational performance areas to address ADL retraining, functional transfer training, UE strengthening/ROM, endurance training, cognitive reorientation, Pt/caregiver education, equipment evaluation/education, compensatory technique education, energy conservation and activity engagement   Pt would benefit from continued skilled OT services while in hospital to maximize independence with ADLs  Will continue to follow Pt's goals and progress  Pt would benefit from post acute rehabilitation services upon DC to maximize safety and independence with ADLs and functional tasks of choice  Plan   Treatment Interventions ADL retraining;Functional transfer training;UE strengthening/ROM; Endurance training;Cognitive reorientation;Patient/family training;Equipment evaluation/education; Compensatory technique education; Activityengagement; Energy conservation   Goal Expiration Date 02/07/22  (Ext goals x14 days upon IE)   OT Treatment Day 1   OT Frequency 3-5x/wk   Recommendation   OT Discharge Recommendation Post acute rehabilitation services   OT - OK to Discharge Yes   Additional Comments  The patient's raw score on the AM-PAC Daily Activity inpatient short form is 15, standardized score is 34 69, less than 39 4  Patients at this level are likely to benefit from discharge to post-acute rehabilitation services  Please refer to the recommendation of the Occupational Therapist for safe discharge planning     AM-PAC Daily Activity Inpatient   Lower Body Dressing 2   Bathing 2   Toileting 2   Upper Body Dressing 3   Grooming 3   Eating 3   Daily Activity Raw Score 15   Daily Activity Standardized Score (Calc for Raw Score >=11) 34 69   AM-PAC Applied Cognition Inpatient   Following a Speech/Presentation 3   Understanding Ordinary Conversation 4   Taking Medications 3   Remembering Where Things Are Placed or Put Away 3   Remembering List of 4-5 Errands 3   Taking Care of Complicated Tasks 3   Applied Cognition Raw Score 19   Applied Cognition Standardized Score 39 77     Amparo Hathaway MS, OTR/L

## 2022-01-24 NOTE — CASE MANAGEMENT
Case Management Discharge Planning Note    Patient name Emir Griffin  Location [facility] 807/[facility] 023-32 MRN [id number]  : 1950 Date 2022       Current Admission Date: 1/10/2022  Current Admission Diagnosis:Sepsis [facility])   Patient Active Problem List    Diagnosis Date Noted    COVID-19 2022    [facility]fib ([facility] ) 01/10/2022    Sepsis ([facility] ) 01/10/2022    Continuous opioid dependence ([facility] ) 2021    Hyperparathyroidism ([facility] ) 2021    Medicare annual wellness visit, subsequent 2021    Murmur, cardiac 2021    Morbid obesity with BMI of 40 0-44 9, adult ([place] [address] ) 2019    BPPV (benign paroxysmal positional vertigo) 2018    Seasonal allergic rhinitis due to pollen 2018    Erythroderma 10/27/2017    Osteoporosis 2016    Rheumatoid arthritis ([facility] ) 2016    GERD (gastroesophageal reflux disease) 2016    Sarcoid 2016    Morbid obesity ([place] [address] ) 2016    Vitamin D deficiency 2015    Benign essential hypertension 2014    Lumbar radiculopathy 2014      LOS (days): 14  Geometric Mean LOS (GMLOS) (days): 3 50  Days to GMLOS:-10 6     OBJECTIVE:  Risk of Unplanned Readmission Score: 19         Current admission status: Inpatient   Preferred Pharmacy:   [facility] [address]  Phone: [phone] Fax: [phone]    Primary Care Provider: Andreas Solomon DO    Primary Insurance: [facility]  Secondary Insurance:     DISCHARGE DETAILS:       Additional Comments: Oscar spoke with Isabelle, the Geena English from [address] [place] [address], regarding the pt's potentially being accepted to a bed at the [facility] facility    Isabelle explained that the daughter informed the facility that the pt could not return home until the family completes their plan of moving into a ranch-style home, and because of this, the pt was now considered a LT bariatric and could no longer be accepted to the facility  CM called pt's daughter, Raymond Macedo, who informed CM that there was a misunderstanding, explaining that moving into a ranch house is an eventual goal but is not required for the family to take the pt home  CM relayed this information to the Brad Driver, who told CM that the referral was still considered a denial but they will follow in the event beds open up at other facilities  CM to continue to follow

## 2022-01-24 NOTE — PROGRESS NOTES
Progress Note - Infectious Disease   Thea December Al 66 y o  female MRN: [id number]  Unit/Bed#: [facility] 807-01 Encounter: [id number]      Impression/Plan:    1  Sepsis-evolving since admission  Tachycardia, fevers, leukocytosis on admission  CT imaging on admission did not show evidence of pneumonia  Strep pneumoniae urine antigen positive, consider bronchitis or sinusitis vs viral etiology  CT A/P no abnormalities  COVID/flu/RSV PCR negative on admission  Has chronic skin erythroderma, but does not appear to have areas of cellulitis  Procalcitonin downtrended and now negative  Completed 7 days of antibiotics for possible bronchitis and is clinically improved  However, she again had low grade fevers and daughter who had been visiting her recently tested positive for COVID, so repeat COVID PCR sent 1/19 and is positive  Fevers now resolved and she is clinically improved  Suspect post-infectious cough  -monitor off antibiotics               -supportive care for cough   -COVID management as below     2  COVID-19  PCR negative on admission, repeat 1/19 positive  Patient continued to have cough and low grade fevers, and daughter recently tested positive (has been visiting)  Patient on RA  She is at high risk of severe disease due to immunocompromised status, so completed 5 days remdesivir   -no further COVID therapies indicated    3  Coagulase negative staph in blood culture  1/2 sets on admission growing coagulase negative staph  Likely contaminant  Repeat cultures no growth  -no further treatment needed     4  Erythroderma  Patient with worsening skin rash over last 5 years  Did not improve on Humira  Status post skin biopsy 1/12 by Dermatology, pathology most consistent with pustular psoriasis  Status post shave biopsy 1/16, sent to [facility] for immunofluorescence analysis  -Dermatology follow up   -follow up pathology              -local skin care              -HTLV1 Ab negative     5  Rheumatoid arthritis  On plaquenil and prednisone  Patient is immunocompromised      6  Afib   New onset  Started on metoprolol and Eliquis       7  AMANDA   Present on admission in setting of #1  Now improved     I have discussed the above management plan in detail with the primary service  ID will sign off, please call with questions  Antibiotics:  S/p 7 days Ceftriaxone  Off abx D8  S/p remdesivir 5 days    Subjective:  Patient feeling well today  Fevers resolved  Cough is improved, mainly just present at night  She denies chest pain, fever, chills, diarrhea  Awaiting rehab placement  Objective:  Vitals:  Temp:  [98 °F (36 7 °C)-98 6 °F (37 °C)] 98 6 °F (37 °C)  HR:  [63-68] 64  Resp:  [18] 18  BP: (129-144)/(56-97) 144/56  SpO2:  [96 %-97 %] 97 %  Temp (24hrs), Av 3 °F (36 8 °C), Min:98 °F (36 7 °C), Max:98 6 °F (37 °C)  Current: Temperature: 98 6 °F (37 °C)    Physical Exam:   General Appearance:  Alert, interactive, nontoxic, no acute distress  Throat: Oropharynx moist without lesions  Lungs:   Clear to auscultation bilaterally; no wheezes, rhonchi or rales; respirations unlabored   Heart:  RRR; no murmur, rub or gallop   Abdomen:   Soft, non-tender, non-distended, positive bowel sounds  Extremities: No clubbing, cyanosis or edema   Skin: No rashes or lesions  No draining wounds noted  Diffuse skin desquamation, improving       Labs:    All pertinent labs and imaging studies were personally reviewed  Results from last 7 days   Lab Units 22  0508 22  0539   WBC Thousand/uL 2 96* 3 50*   HEMOGLOBIN g/dL 13 2 12 5   PLATELETS Thousands/uL 244 224     Results from last 7 days   Lab Units 22  0508 22  0539 22  0539   SODIUM mmol/L 138  --  137   POTASSIUM mmol/L 3 4*   < > 3 5   CHLORIDE mmol/L 104   < > 105   CO2 mmol/L 28   < > 27   BUN mg/dL 14   < > 11   CREATININE mg/dL 0 81   < > 0 79   EGFR ml/min/1 73sq m 72   < > 74   CALCIUM mg/dL 9 1   < > 8 9   AST U/L 31 --   --    ALT U/L 28  --   --    ALK PHOS U/L 58  --   --     < > = values in this interval not displayed  Results from last 7 days   Lab Units 01/20/22  0508 01/19/22  0539   PROCALCITONIN ng/ml 0 07 0 11     Results from last 7 days   Lab Units 01/20/22  0508   CRP mg/L 9 7*           Imaging:  I have personally reviewed pertinent imaging study reports and images in PACS      CT Chest-no consolidations     Other Studies:   I have personally reviewed pertinent reports

## 2022-01-24 NOTE — ASSESSMENT & PLAN NOTE
· Resolved  · Evolving since admission, Patient with fever and tachycardia  · Send Blood cultures, check UA, CXR negative - chest CT is negative for any acute pathology  CT abdomen is rather unremarkable   · COVID/flu/RSV negative  · procalcitonin now normal   · 1/2 sets of blood culture came back positive for Gram-positive cocci in hahbzuyk-aeuvlruft-xychznik Staph likely contaminant  · Urine strep antigen is positive-completed antibiotic course  · Due to recurrent low-grade fever COVID exposure with her daughter being positive and a recent visit by Daughter , COVID swab was checked on 01/19 which came back positive  · Now on remdesivir given immunocompromised state  · Maintaining O2 sats on ambient air  · Continue Eliquis for anticoagulation    · Infectious Disease input noted

## 2022-01-24 NOTE — SPEECH THERAPY NOTE
Speech Language/Pathology    Speech/Language Pathology Progress Note    Patient Name: Bryce PACEH'X Date: 2022     Problem List  Principal Problem:    Sepsis ([facility] )  Active Problems:    Rheumatoid arthritis ([facility] )    GERD (gastroesophageal reflux disease)    Benign essential hypertension    Morbid obesity ([facility] )    Erythroderma    Murmur, cardiac    New onset a-fib ([facility] )    COVID-19       Past Medical History  Past Medical History:   Diagnosis Date    Abnormal thyroid function test     last assessed: 2015     Arthritis     Caries     last assessed: 2016     Edema of right lower extremity     last assessed: 2015     GERD (gastroesophageal reflux disease)     Hypertension     Sarcoid         Past Surgical History  Past Surgical History:   Procedure Laterality Date     SECTION      MULTIPLE TOOTH EXTRACTIONS N/A 2016    Procedure: Surgical extraction of teeth 2, 18, 19, 30, 31; incision and drainage of left subperiosteal abscess ;  Surgeon: Merle Lee DMD;  Location: [facility];  Service:          Subjective:  Pt seen for dysphagia tx  Pt sitting upright in chair  Pt was pleasant, cooperative  Objective:  Reviewed results of VBS with pt, educated her on diet recommendations (regular and thin liquids), aspiration and reflux precautions, and strategies (multiple swallows per bite/sip, small bites/sips)  Pt had eaten most of her lunch, but was willing to eat a few bites/sips to practice strategies/precautions  She fed herself several bites of cake, and drank milk and water by straw  Mastication was mildly prolonged; pt independently took sips of liquid to assist with moistening bolus  Hyolaryngeal elevation observed  After initial education for multiple swallows per bite/sip, pt completed independently   Pt did cough x1 immediately after consecutive sips of milk by straw, but tolerated multiple other sips and bites of cake without any overt clinical s/s of aspiration  Assessment:  Pt verbalized and demonstrated understanding of results and recommendations from VBS  Pt appears to tolerate regular diet/thin liquids when following strategies, including taking small bites/sips, extra dry swallows  Plan/Recommendations:  Continue regular diet and thin liquids  Aspiration and reflux precautions     Eat slowly, small bites/sips, extra dry swallows  No further ST recommended at this time, discharge from [address] [place] [address]

## 2022-01-24 NOTE — UTILIZATION REVIEW
Continued Stay Review    Date: 1/24/2022                     Current Patient Class: inpatient  Current Level of Care: med/surg  HPI:71 y o  female initially admitted on 1/10 with sepsis  Assessment/Plan: Pt currently with no complaints  VSS  Continues on room air  Continue po meds  Supportive care  Encourage OOB, incentive spirometry  Speech therapy note with recommendation for reg diet, thin liquids  Pt appetite fair  PT/OT evals today with recommendation for IP rehab on d/c  Cm working with pt/family for accepting facility      Vital Signs:   Date/Time Temp Pulse Resp BP MAP (mmHg) SpO2 O2 Device   01/24/22 0950 -- -- -- -- -- -- None (Room air)   01/24/22 08:01:50 98 6 °F (37 °C) 64 18 144/56 85 97 % --   01/23/22 20:23:45 98 °F (36 7 °C) 68 18 129/97 108 96 % --   01/23/22 17:53:01 98 4 °F (36 9 °C) 63 18 137/61 86 97 % --   01/23/22 0930 -- -- -- -- -- -- None (Room air)   01/23/22 0900 -- -- -- -- -- -- None (Room air)   01/23/22 07:50:14 98 1 °F (36 7 °C) 61 16 123/59 80 97 % --   01/22/22 23:43:36 98 6 °F (37 °C) 61 18 124/57 79 95 % --   01/22/22 20:53:59 -- 66 -- 127/53 78 96 % --   01/22/22 15:26:39 -- 66 20 128/52 77 95 % --   01/22/22 0900 -- -- -- -- -- -- None (Room air)   01/22/22 08:19:55 -- 66 16 110/58 75 95 % --         Pertinent Labs/Diagnostic Results:   Results from last 7 days   Lab Units 01/19/22  1149   SARS-COV-2  Positive*     Results from last 7 days   Lab Units 01/20/22  0508 01/19/22  0539   WBC Thousand/uL 2 96* 3 50*   HEMOGLOBIN g/dL 13 2 12 5   HEMATOCRIT % 41 6 39 1   PLATELETS Thousands/uL 244 224   NEUTROS ABS Thousands/µL 1 42* 2 04     Results from last 7 days   Lab Units 01/20/22  0508 01/19/22  0539   SODIUM mmol/L 138 137   POTASSIUM mmol/L 3 4* 3 5   CHLORIDE mmol/L 104 105   CO2 mmol/L 28 27   ANION GAP mmol/L 6 5   BUN mg/dL 14 11   CREATININE mg/dL 0 81 0 79   EGFR ml/min/1 73sq m 72 74   CALCIUM mg/dL 9 1 8 9     Results from last 7 days   Lab Units 01/20/22  0508 AST U/L 31   ALT U/L 28   ALK PHOS U/L 58   TOTAL PROTEIN g/dL 7 0   ALBUMIN g/dL 2 4*   TOTAL BILIRUBIN mg/dL 0 14*     Results from last 7 days   Lab Units 01/20/22  0508 01/19/22  0539   GLUCOSE RANDOM mg/dL 76 86     Results from last 7 days   Lab Units 01/20/22  0508   CK TOTAL U/L 79     Results from last 7 days   Lab Units 01/20/22  0508 01/19/22  0539   PROCALCITONIN ng/ml 0 07 0 11     Results from last 7 days   Lab Units 01/20/22  0508   CRP mg/L 9 7*     Results from last 7 days   Lab Units 01/19/22  1149   INFLUENZA A PCR  Negative   INFLUENZA B PCR  Negative   RSV PCR  Negative       Medications:   Scheduled Medications:  apixaban, 5 mg, Oral, BID  cholecalciferol, 1,000 Units, Oral, Daily  clotrimazole, , Topical, BID  guaiFENesin, 600 mg, Oral, Q12H FAMILIA  hydrochlorothiazide, 12 5 mg, Oral, Daily  hydroxychloroquine, 200 mg, Oral, BID  lidocaine, 1 patch, Topical, Daily  metoprolol tartrate, 25 mg, Oral, Q12H [facility]  MAGALY ANTIFUNGAL, , Topical, BID  pantoprazole, 40 mg, Oral, Early Morning  predniSONE, 5 mg, Oral, Daily  triamcinolone, , Topical, BID      PRN Meds:  acetaminophen, 650 mg, Oral, Q4H PRN  albuterol, 2 puff, Inhalation, Q4H PRN  ammonium lactate, , Topical, BID PRN  benzonatate, 100 mg, Oral, TID PRN  diphenhydrAMINE, 25 mg, Oral, Q6H PRN  hydrocodone-chlorpheniramine polistirex, 5 mL, Oral, Q12H PRN  metoprolol, 5 mg, Intravenous, Q6H PRN  sodium chloride, 1 spray, Each Nare, Q1H PRN  sodium chloride (PF), 3 mL, Intravenous, Q1H PRN        Discharge Plan: D    Network Utilization Review Department  ATTENTION: Please call with any questions or concerns to [phone] and carefully listen to the prompts so that you are directed to the right person  All voicemails are confidential   Adam Reina all requests for admission clinical reviews, approved or denied determinations and any other requests to dedicated fax number below belonging to the campus where the patient is receiving treatment   List of dedicated fax numbers for the Facilities:  [address] [place] [address] DENIALS (Administrative/Medical Necessity) [phone]   [address] (Maternity/NICU/Pediatrics) [phone] [address]  [address] [place] [address] [address] [place] [address] [address] [facility] [phone]

## 2022-01-24 NOTE — CASE MANAGEMENT
Case Management Discharge Planning Note    Patient name Tita Rivera  [facility] [id number]  : 1950 Date 2022       Current Admission Date: 1/10/2022  Current Admission Diagnosis:Sepsis [facility])   Patient Active Problem List    Diagnosis Date Noted    COVID-19 2022    [facility] 01/10/2022    Sepsis ([facility] ) 01/10/2022    Continuous opioid dependence ([facility] ) 2021    Hyperparathyroidism ([place] [address] ) 2021    Medicare annual wellness visit, subsequent 2021    Murmur, cardiac 2021    Morbid obesity with BMI of 40 0-44 9, adult ([place] [address] ) 2019    BPPV (benign paroxysmal positional vertigo) 2018    Seasonal allergic rhinitis due to pollen 2018    Erythroderma 10/27/2017    Osteoporosis 2016    Rheumatoid arthritis ([facility] ) 2016    GERD (gastroesophageal reflux disease) 2016    Sarcoid 2016    Morbid obesity ([place] [address] ) 2016    Vitamin D deficiency 2015    Benign essential hypertension 2014    Lumbar radiculopathy 2014      LOS (days): 14  Geometric Mean LOS (GMLOS) (days): 3 50  Days to GMLOS:-10 4     OBJECTIVE:  Risk of Unplanned Readmission Score: 19         Current admission status: Inpatient   Preferred Pharmacy:   [id number] [address] [place] [id number]  Phone: [phone] Fax: [phone]    Primary Care Provider: Rosalinda Juarez DO    Primary Insurance: [facility]  Secondary Insurance:     DISCHARGE DETAILS:          Additional Comments: CM spoke with pt's daughter, Shira Winston, concerning a possible accepting bed at [facility]  CM explained the acceptance is pending therapy notes  Pt's daughter agreed to the facility and CM answered followup questions  CM to follow

## 2022-01-24 NOTE — ASSESSMENT & PLAN NOTE
· Patient noted to have diffuse rash on her skin  Skin is dry and peeling  · No definite diagnosis  Has been going on for the past 5 years started after being on IV antibiotics for a dental infection  · Family reported that the diagnosis of psoriasis was ruled out since the patient did not get better while on Humira for rheumatoid arthritis  · Dermatology evaluation appreciated  Erythroderma  Biopsy results show pustular psoriasis  Patient is status post shave biopsy on 01/16 and sent to [facility] for immunofluorescence    Outpatient follow-up with the dermatology  · Wound and rash care per dermatology team

## 2022-01-24 NOTE — PLAN OF CARE
Problem: PHYSICAL THERAPY ADULT  Goal: Performs mobility at highest level of function for planned discharge setting  See evaluation for individualized goals  Description: Treatment/Interventions: Functional transfer training,LE strengthening/ROM,Therapeutic exercise,Elevations,Endurance training,Patient/family training,Equipment eval/education,Bed mobility,Gait training,Spoke to nursing,Spoke to case management,OT  Equipment Recommended: Katt De La O       See flowsheet documentation for full assessment, interventions and recommendations  Outcome: Progressing  Note: Prognosis: Good  Problem List: Decreased strength,Decreased endurance,Impaired balance,Decreased mobility,Decreased safety awareness,Obesity,Decreased skin integrity  Assessment: Pt seen for PT session w/ focus on HEP instruction, gait training w/ RW, + transfers  Pt w/ slow but steady progress this session as she required decreased assistance for sup>sit t/f and ambulation  Pt w/ improved ability to push up from chair this session during transfers, but continues to demonstrate posterior lean requiring cues + ModAx1 to achieve fully upright standing position  Pt reported fatigue following toilet transfer  Max encouragement to participate in further ambulation training  From a PT standpoint continue to recommend rehab  Barriers to Discharge: Inaccessible home environment,Decreased caregiver support        PT Discharge Recommendation: Post acute rehabilitation services          See flowsheet documentation for full assessment

## 2022-01-25 PROCEDURE — 99232 SBSQ HOSP IP/OBS MODERATE 35: CPT | Performed by: GENERAL PRACTICE

## 2022-01-25 RX ADMIN — TRIAMCINOLONE ACETONIDE: 1 OINTMENT TOPICAL at 09:27

## 2022-01-25 RX ADMIN — CLOTRIMAZOLE: 1 CREAM TOPICAL at 17:38

## 2022-01-25 RX ADMIN — GUAIFENESIN 600 MG: 600 TABLET, EXTENDED RELEASE ORAL at 09:18

## 2022-01-25 RX ADMIN — CLOTRIMAZOLE: 1 CREAM TOPICAL at 09:27

## 2022-01-25 RX ADMIN — PANTOPRAZOLE SODIUM 40 MG: 40 TABLET, DELAYED RELEASE ORAL at 05:53

## 2022-01-25 RX ADMIN — HYDROXYCHLOROQUINE SULFATE 200 MG: 200 TABLET, FILM COATED ORAL at 17:38

## 2022-01-25 RX ADMIN — MICONAZOLE NITRATE: 20 CREAM TOPICAL at 09:44

## 2022-01-25 RX ADMIN — MICONAZOLE NITRATE: 20 CREAM TOPICAL at 17:38

## 2022-01-25 RX ADMIN — HYDROCHLOROTHIAZIDE 12.5 MG: 12.5 TABLET ORAL at 09:18

## 2022-01-25 RX ADMIN — Medication 1000 UNITS: at 09:18

## 2022-01-25 RX ADMIN — GUAIFENESIN 600 MG: 600 TABLET, EXTENDED RELEASE ORAL at 20:20

## 2022-01-25 RX ADMIN — LIDOCAINE 5% 1 PATCH: 700 PATCH TOPICAL at 09:18

## 2022-01-25 RX ADMIN — HYDROXYCHLOROQUINE SULFATE 200 MG: 200 TABLET, FILM COATED ORAL at 09:22

## 2022-01-25 RX ADMIN — APIXABAN 5 MG: 5 TABLET, FILM COATED ORAL at 09:18

## 2022-01-25 RX ADMIN — METOPROLOL TARTRATE 25 MG: 25 TABLET, FILM COATED ORAL at 20:20

## 2022-01-25 RX ADMIN — APIXABAN 5 MG: 5 TABLET, FILM COATED ORAL at 17:37

## 2022-01-25 RX ADMIN — TRIAMCINOLONE ACETONIDE: 1 OINTMENT TOPICAL at 17:38

## 2022-01-25 RX ADMIN — PREDNISONE 5 MG: 5 TABLET ORAL at 09:18

## 2022-01-25 RX ADMIN — METOPROLOL TARTRATE 25 MG: 25 TABLET, FILM COATED ORAL at 09:18

## 2022-01-25 NOTE — PLAN OF CARE
Problem: MOBILITY - ADULT  Goal: Maintain or return to baseline ADL function  Description: INTERVENTIONS:  -  Assess patient's ability to carry out ADLs; assess patient's baseline for ADL function and identify physical deficits which impact ability to perform ADLs (bathing, care of mouth/teeth, toileting, grooming, dressing, etc )  - Assess/evaluate cause of self-care deficits   - Assess range of motion  - Assess patient's mobility; develop plan if impaired  - Assess patient's need for assistive devices and provide as appropriate  - Encourage maximum independence but intervene and supervise when necessary  - Involve family in performance of ADLs  - Assess for home care needs following discharge   - Consider OT consult to assist with ADL evaluation and planning for discharge  - Provide patient education as appropriate  Outcome: Progressing  Goal: Maintains/Returns to pre admission functional level  Description: INTERVENTIONS:  - Perform BMAT or MOVE assessment daily    - Set and communicate daily mobility goal to care team and patient/family/caregiver  - Collaborate with rehabilitation services on mobility goals if consulted  - Perform Range of Motion  times a day  - Reposition patient every  hours    - Dangle patient  times a day  - Stand patient  times a day  - Ambulate patient  times a day  - Out of bed to chair  times a day   - Out of bed for meals  times a day  - Out of bed for toileting  - Record patient progress and toleration of activity level   Outcome: Progressing     Problem: Prexisting or High Potential for Compromised Skin Integrity  Goal: Skin integrity is maintained or improved  Description: INTERVENTIONS:  - Identify patients at risk for skin breakdown  - Assess and monitor skin integrity  - Assess and monitor nutrition and hydration status  - Monitor labs   - Assess for incontinence   - Turn and reposition patient  - Assist with mobility/ambulation  - Relieve pressure over bony prominences  - Avoid friction and shearing  - Provide appropriate hygiene as needed including keeping skin clean and dry  - Evaluate need for skin moisturizer/barrier cream  - Collaborate with interdisciplinary team   - Patient/family teaching  - Consider wound care consult   Outcome: Progressing     Problem: Potential for Falls  Goal: Patient will remain free of falls  Description: INTERVENTIONS:  - Educate patient/family on patient safety including physical limitations  - Instruct patient to call for assistance with activity   - Consult OT/PT to assist with strengthening/mobility   - Keep Call bell within reach  - Keep bed low and locked with side rails adjusted as appropriate  - Keep care items and personal belongings within reach  - Initiate and maintain comfort rounds  - Make Fall Risk Sign visible to staff  - Offer Toileting every  Hours, in advance of need  - Initiate/Maintain alarm  - Obtain necessary fall risk management equipment:   - Apply yellow socks and bracelet for high fall risk patients  - Consider moving patient to room near nurses station  Outcome: Progressing     Problem: Nutrition/Hydration-ADULT  Goal: Nutrient/Hydration intake appropriate for improving, restoring or maintaining nutritional needs  Description: Monitor and assess patient's nutrition/hydration status for malnutrition  Collaborate with interdisciplinary team and initiate plan and interventions as ordered  Monitor patient's weight and dietary intake as ordered or per policy  Utilize nutrition screening tool and intervene as necessary  Determine patient's food preferences and provide high-protein, high-caloric foods as appropriate       INTERVENTIONS:  - Monitor oral intake, urinary output, labs, and treatment plans  - Assess nutrition and hydration status and recommend course of action  - Evaluate amount of meals eaten  - Assist patient with eating if necessary   - Allow adequate time for meals  - Recommend/ encourage appropriate diets, oral nutritional supplements, and vitamin/mineral supplements  - Order, calculate, and assess calorie counts as needed  - Recommend, monitor, and adjust tube feedings and TPN/PPN based on assessed needs  - Assess need for intravenous fluids  - Provide specific nutrition/hydration education as appropriate  - Include patient/family/caregiver in decisions related to nutrition  Outcome: Progressing     Problem: PAIN - ADULT  Goal: Verbalizes/displays adequate comfort level or baseline comfort level  Description: Interventions:  - Encourage patient to monitor pain and request assistance  - Assess pain using appropriate pain scale  - Administer analgesics based on type and severity of pain and evaluate response  - Implement non-pharmacological measures as appropriate and evaluate response  - Consider cultural and social influences on pain and pain management  - Notify physician/advanced practitioner if interventions unsuccessful or patient reports new pain  Outcome: Progressing     Problem: INFECTION - ADULT  Goal: Absence or prevention of progression during hospitalization  Description: INTERVENTIONS:  - Assess and monitor for signs and symptoms of infection  - Monitor lab/diagnostic results  - Monitor all insertion sites, i e  indwelling lines, tubes, and drains  - Monitor endotracheal if appropriate and nasal secretions for changes in amount and color  - [place] appropriate cooling/warming therapies per order  - Administer medications as ordered  - Instruct and encourage patient and family to use good hand hygiene technique  - Identify and instruct in appropriate isolation precautions for identified infection/condition  Outcome: Progressing     Problem: SAFETY ADULT  Goal: Maintain or return to baseline ADL function  Description: INTERVENTIONS:  -  Assess patient's ability to carry out ADLs; assess patient's baseline for ADL function and identify physical deficits which impact ability to perform ADLs (bathing, care of mouth/teeth, toileting, grooming, dressing, etc )  - Assess/evaluate cause of self-care deficits   - Assess range of motion  - Assess patient's mobility; develop plan if impaired  - Assess patient's need for assistive devices and provide as appropriate  - Encourage maximum independence but intervene and supervise when necessary  - Involve family in performance of ADLs  - Assess for home care needs following discharge   - Consider OT consult to assist with ADL evaluation and planning for discharge  - Provide patient education as appropriate  Outcome: Progressing  Goal: Maintains/Returns to pre admission functional level  Description: INTERVENTIONS:  - Perform BMAT or MOVE assessment daily    - Set and communicate daily mobility goal to care team and patient/family/caregiver  - Collaborate with rehabilitation services on mobility goals if consulted  - Perform Range of Motion  times a day  - Reposition patient every  hours    - Dangle patient  times a day  - Stand patient  times a day  - Ambulate patient  times a day  - Out of bed to chair  times a day   - Out of bed for meals  times a day  - Out of bed for toileting  - Record patient progress and toleration of activity level   Outcome: Progressing  Goal: Patient will remain free of falls  Description: INTERVENTIONS:  - Educate patient/family on patient safety including physical limitations  - Instruct patient to call for assistance with activity   - Consult OT/PT to assist with strengthening/mobility   - Keep Call bell within reach  - Keep bed low and locked with side rails adjusted as appropriate  - Keep care items and personal belongings within reach  - Initiate and maintain comfort rounds  - Make Fall Risk Sign visible to staff  - Offer Toileting every  Hours, in advance of need  - Initiate/Maintain alarm  - Obtain necessary fall risk management equipment:  - Apply yellow socks and bracelet for high fall risk patients  - Consider moving patient to room near nurses station  Outcome: Progressing     Problem: DISCHARGE PLANNING  Goal: Discharge to home or other facility with appropriate resources  Description: INTERVENTIONS:  - Identify barriers to discharge w/patient and caregiver  - Arrange for needed discharge resources and transportation as appropriate  - Identify discharge learning needs (meds, wound care, etc )  - Arrange for interpretive services to assist at discharge as needed  - Refer to Case Management Department for coordinating discharge planning if the patient needs post-hospital services based on physician/advanced practitioner order or complex needs related to functional status, cognitive ability, or social support system  Outcome: Progressing     Problem: Knowledge Deficit  Goal: Patient/family/caregiver demonstrates understanding of disease process, treatment plan, medications, and discharge instructions  Description: Complete learning assessment and assess knowledge base    Interventions:  - Provide teaching at level of understanding  - Provide teaching via preferred learning methods  Outcome: Progressing

## 2022-01-25 NOTE — PROGRESS NOTES
[address] 1950, 66 y o  female MRN: [id number]  Unit/Bed#: [facility] 807-01 Encounter: [id number]  Primary Care Provider: Candi Jimenez DO   Date and time admitted to hospital: 1/10/2022 10:11 AM    * Sepsis ([address] )  Assessment & Plan  · Resolved  · Evolving since admission, Patient with fever and tachycardia  · Possibly bronchitis - completed 7 days abx  · COVID/flu/RSV orginally negative  · procalcitonin now normal   · 1/2 sets of blood culture came back positive for Gram-positive cocci in oizuyvln-ojgzdfneu-dhumblgv Staph likely contaminant  · Urine strep antigen is positive-completed antibiotic course  · Due to recurrent low-grade fever COVID exposure with her daughter being positive and a recent visit by Daughter , COVID swab was checked on 01/19 which came back positive  · Remdesivir completed given immunocompromised state  · Maintaining O2 sats on ambient air  · Continue Eliquis for anticoagulation  · Infectious Disease input noted    COVID-19  Assessment & Plan  · Tested positive for COVID on 01/19  Her daughter visited her recently and daughter tested positive in the last 2 days  · Maintaining O2 sats on ambient air  · Placed on mild treatment protocol  · Monitor O2 sats  · Supportive care  · Defer to ID on how long patient should be on isolation considering she is not hypoxic but is immunocompromised    New onset a-fib [facility])  Assessment & Plan  · On presentation was in afib RVR with -120s  · After IV lopressor x1, converted to NSR  · Seen by cardio -> cont lopressor 25 BID, continue p o  Eliquis  · Patient will be discharged to rehab      Erythroderma  Assessment & Plan  · Patient noted to have diffuse rash on her skin  Skin is dry and peeling  · No definite diagnosis    Has been going on for the past 5 years started after being on IV antibiotics for a dental infection  · Family reported that the diagnosis of psoriasis was ruled out since the patient did not get better while on Humira for rheumatoid arthritis  · Dermatology evaluation appreciated  Erythroderma  Biopsy results show pustular psoriasis  Patient is status post shave biopsy on  and sent to [facility] for immunofluorescence  Outpatient follow-up with the dermatology  · Wound and rash care per dermatology team       Morbid obesity [facility])  Assessment & Plan  Body mass index is 42 76 kg/m²  Therapeutic lifestyle modification    Benign essential hypertension  Assessment & Plan  · Monitor blood pressures  · Avoid hypotension    Rheumatoid arthritis (HCC)  Assessment & Plan  · Continue plaquenil, prednisone 5 mg QD      VTE Pharmacologic Prophylaxis: VTE Score: 5 High Risk (Score >/= 5) - Pharmacological DVT Prophylaxis Ordered: apixaban (Eliquis)  Sequential Compression Devices Ordered  Patient Centered Rounds: I performed bedside rounds with nursing staff today  Discussions with Specialists or Other Care Team Provider: no    Education and Discussions with Family / Patient: Updated  (daughter) via phone  Time Spent for Care: 30 minutes  More than 50% of total time spent on counseling and coordination of care as described above  Current Length of Stay: 15 day(s)  Current Patient Status: Inpatient   Certification Statement: The patient will continue to require additional inpatient hospital stay due to need for rehab  Discharge Plan: pending rehab placement    Code Status: Level 1 - Full Code    Subjective:   No acute complaints    Objective:     Vitals:   Temp (24hrs), Av 3 °F (36 8 °C), Min:97 9 °F (36 6 °C), Max:98 5 °F (36 9 °C)    Temp:  [97 9 °F (36 6 °C)-98 5 °F (36 9 °C)] 98 5 °F (36 9 °C)  HR:  [68-72] 68  Resp:  [18-20] 20  BP: (127-141)/(66-92) 127/66  SpO2:  [96 %-97 %] 97 %  Body mass index is 42 76 kg/m²  Input and Output Summary (last 24 hours):      Intake/Output Summary (Last 24 hours) at 2022 1442  Last data filed at 1/25/2022 1300  Gross per 24 hour   Intake 600 ml   Output --   Net 600 ml       Physical Exam:   Physical Exam  HENT:      Head: Normocephalic and atraumatic  Nose: Nose normal       Mouth/Throat:      Mouth: Mucous membranes are moist    Eyes:      Extraocular Movements: Extraocular movements intact  Conjunctiva/sclera: Conjunctivae normal    Cardiovascular:      Rate and Rhythm: Normal rate and regular rhythm  Pulmonary:      Effort: Pulmonary effort is normal       Breath sounds: Normal breath sounds  No wheezing or rales  Abdominal:      General: Bowel sounds are normal  There is no distension  Palpations: Abdomen is soft  Tenderness: There is no abdominal tenderness  Musculoskeletal:         General: Normal range of motion  Cervical back: Normal range of motion and neck supple  Right lower leg: No edema  Left lower leg: No edema  Skin:     General: Skin is warm and dry  Neurological:      Mental Status: She is alert and oriented to person, place, and time  Additional Data:     Labs:  Results from last 7 days   Lab Units 01/20/22  0508   WBC Thousand/uL 2 96*   HEMOGLOBIN g/dL 13 2   HEMATOCRIT % 41 6   PLATELETS Thousands/uL 244   NEUTROS PCT % 48   LYMPHS PCT % 37   MONOS PCT % 12   EOS PCT % 2     Results from last 7 days   Lab Units 01/20/22  0508   SODIUM mmol/L 138   POTASSIUM mmol/L 3 4*   CHLORIDE mmol/L 104   CO2 mmol/L 28   BUN mg/dL 14   CREATININE mg/dL 0 81   ANION GAP mmol/L 6   CALCIUM mg/dL 9 1   ALBUMIN g/dL 2 4*   TOTAL BILIRUBIN mg/dL 0 14*   ALK PHOS U/L 58   ALT U/L 28   AST U/L 31   GLUCOSE RANDOM mg/dL 76                 Results from last 7 days   Lab Units 01/20/22  0508 01/19/22  0539   PROCALCITONIN ng/ml 0 07 0 11       Lines/Drains:  Invasive Devices  Report    Peripheral Intravenous Line            Peripheral IV 01/25/22 Dorsal (posterior); Left Forearm <1 day          Drain            External Urinary Catheter 13 days Imaging: No pertinent imaging reviewed  Recent Cultures (last 7 days):         Last 24 Hours Medication List:   Current Facility-Administered Medications   Medication Dose Route Frequency Provider Last Rate    acetaminophen  650 mg Oral Q4H PRN Michael Michele MD      albuterol  2 puff Inhalation Q4H PRN [place] Covert, MD      ammonium lactate   Topical BID PRN Brea Covert, MD      apixaban  5 mg Oral BID Reinaldo Jean-Baptiste DO      benzonatate  100 mg Oral TID PRN [place] Covert, MD      cholecalciferol  1,000 Units Oral Daily Michael Michele MD      clotrimazole   Topical BID Michael Michele MD      diphenhydrAMINE  25 mg Oral Q6H PRN Brea Covert, MD      guaiFENesin  600 mg Oral Q12H [address] Brea Covert, MD      hydrochlorothiazide  12 5 mg Oral Daily Brea Covert, MD      hydrocodone-chlorpheniramine polistirex  5 mL Oral Q12H PRN Brea Covert, MD      hydroxychloroquine  200 mg Oral BID Michael Michele MD      lidocaine  1 patch Topical Daily Laura Kendall PA-C      metoprolol  5 mg Intravenous Q6H PRN Michael Michele MD      metoprolol tartrate  25 mg Oral Q12H [address] Reinaldo Jean-Baptiste DO      MAGALY ANTIFUNGAL   Topical BID [place] Covert, MD      pantoprazole  40 mg Oral Early Morning Michael Michele MD      predniSONE  5 mg Oral Daily Michael Michele MD      sodium chloride  1 spray Each Nare Q1H PRN Shelia Thurston MD      sodium chloride (PF)  3 mL Intravenous Q1H PRN Michael Michele MD      triamcinolone   Topical BID [place] Soriat, MD          Today, Patient Was Seen By: Tatiana Knox DO    **Please Note: This note may have been constructed using a voice recognition system  **

## 2022-01-25 NOTE — PLAN OF CARE
Problem: Nutrition/Hydration-ADULT  Goal: Nutrient/Hydration intake appropriate for improving, restoring or maintaining nutritional needs  Description: Monitor and assess patient's nutrition/hydration status for malnutrition  Collaborate with interdisciplinary team and initiate plan and interventions as ordered  Monitor patient's weight and dietary intake as ordered or per policy  Utilize nutrition screening tool and intervene as necessary  Determine patient's food preferences and provide high-protein, high-caloric foods as appropriate       INTERVENTIONS:  - Monitor oral intake, urinary output, labs, and treatment plans  - Assess nutrition and hydration status and recommend course of action  - Evaluate amount of meals eaten  - Assist patient with eating if necessary   - Allow adequate time for meals  - Recommend/ encourage appropriate diets, oral nutritional supplements, and vitamin/mineral supplements  - Order, calculate, and assess calorie counts as needed  - Recommend, monitor, and adjust tube feedings and TPN/PPN based on assessed needs  - Assess need for intravenous fluids  - Provide specific nutrition/hydration education as appropriate  - Include patient/family/caregiver in decisions related to nutrition  Outcome: Progressing     Problem: PAIN - ADULT  Goal: Verbalizes/displays adequate comfort level or baseline comfort level  Description: Interventions:  - Encourage patient to monitor pain and request assistance  - Assess pain using appropriate pain scale  - Administer analgesics based on type and severity of pain and evaluate response  - Implement non-pharmacological measures as appropriate and evaluate response  - Consider cultural and social influences on pain and pain management  - Notify physician/advanced practitioner if interventions unsuccessful or patient reports new pain  Outcome: Progressing     Problem: INFECTION - ADULT  Goal: Absence or prevention of progression during hospitalization  Description: INTERVENTIONS:  - Assess and monitor for signs and symptoms of infection  - Monitor lab/diagnostic results  - Monitor all insertion sites, i e  indwelling lines, tubes, and drains  - Monitor endotracheal if appropriate and nasal secretions for changes in amount and color  - [place] appropriate cooling/warming therapies per order  - Administer medications as ordered  - Instruct and encourage patient and family to use good hand hygiene technique  - Identify and instruct in appropriate isolation precautions for identified infection/condition  Outcome: Progressing     Problem: DISCHARGE PLANNING  Goal: Discharge to home or other facility with appropriate resources  Description: INTERVENTIONS:  - Identify barriers to discharge w/patient and caregiver  - Arrange for needed discharge resources and transportation as appropriate  - Identify discharge learning needs (meds, wound care, etc )  - Arrange for interpretive services to assist at discharge as needed  - Refer to Case Management Department for coordinating discharge planning if the patient needs post-hospital services based on physician/advanced practitioner order or complex needs related to functional status, cognitive ability, or social support system  Outcome: Progressing     Problem: Knowledge Deficit  Goal: Patient/family/caregiver demonstrates understanding of disease process, treatment plan, medications, and discharge instructions  Description: Complete learning assessment and assess knowledge base    Interventions:  - Provide teaching at level of understanding  - Provide teaching via preferred learning methods  Outcome: Progressing

## 2022-01-25 NOTE — ASSESSMENT & PLAN NOTE
· Patient noted to have diffuse rash on her skin  Skin is dry and peeling  · No definite diagnosis  Has been going on for the past 5 years started after being on IV antibiotics for a dental infection  · Family reported that the diagnosis of psoriasis was ruled out since the patient did not get better while on Humira for rheumatoid arthritis  · Dermatology evaluation appreciated  Erythroderma  Biopsy results show pustular psoriasis  Patient is status post shave biopsy on 01/16 and sent to [facility] for immunofluorescence    Outpatient follow-up with the dermatology  · Wound and rash care per dermatology team

## 2022-01-25 NOTE — ASSESSMENT & PLAN NOTE
· Tested positive for COVID on 01/19  Her daughter visited her recently and daughter tested positive in the last 2 days    · Maintaining O2 sats on ambient air  · Placed on mild treatment protocol  · Monitor O2 sats  · Supportive care  · Defer to ID on how long patient should be on isolation considering she is not hypoxic but is immunocompromised

## 2022-01-25 NOTE — CASE MANAGEMENT
Case Management Discharge Planning Note    Patient name Opla Gallegos  [facility]N [id number]  : 1950 Date 2022       Current Admission Date: 1/10/2022  Current Admission Diagnosis:Sepsis [facility])   Patient Active Problem List    Diagnosis Date Noted    COVID-19 2022    New onset a-fib ([facility] ) 01/10/2022    [facility] 01/10/2022    Continuous opioid dependence ([facility] ) 2021    Hyperparathyroidism ([facility] ) 2021    Medicare annual wellness visit, subsequent 2021    Murmur, cardiac 2021    Morbid obesity with BMI of 40 0-44 9, adult ([facility] ) 2019    BPPV (benign paroxysmal positional vertigo) 2018    Seasonal allergic rhinitis due to pollen 2018    Erythroderma 10/27/2017    Osteoporosis 2016    Rheumatoid arthritis ([facility] ) 2016    GERD (gastroesophageal reflux disease) 2016    Sarcoid 2016    Morbid obesity ([facility] ) 2016    Vitamin D deficiency 2015    Benign essential hypertension 2014    Lumbar radiculopathy 2014      LOS (days): 15  Geometric Mean LOS (GMLOS) (days): 3 50  Days to GMLOS:-11 6     OBJECTIVE:  Risk of Unplanned Readmission Score: 19         Current admission status: Inpatient   Preferred Pharmacy:   [facility] [address] [place] [id number]  Phone: [phone] Fax: [phone]    Primary Care Provider: José Miguel Benedict DO    Primary Insurance: [facility] REP  Secondary Insurance:     DISCHARGE DETAILS:          IMM Given (Date):: 22  IMM Given to[de-identified] [facility] Name, [address] : [facility] ([id number])  Receiving Facility/Agency Phone Number: [phone]  Facility/Agency Fax Number: [phone]

## 2022-01-25 NOTE — ASSESSMENT & PLAN NOTE
· Resolved  · Evolving since admission, Patient with fever and tachycardia  · Possibly bronchitis - completed 7 days abx  · COVID/flu/RSV orginally negative  · procalcitonin now normal   · 1/2 sets of blood culture came back positive for Gram-positive cocci in bjqlnxkc-knqhchzkl-giunvefy Staph likely contaminant  · Urine strep antigen is positive-completed antibiotic course  · Due to recurrent low-grade fever COVID exposure with her daughter being positive and a recent visit by Daughter , COVID swab was checked on 01/19 which came back positive  · Remdesivir completed given immunocompromised state  · Maintaining O2 sats on ambient air  · Continue Eliquis for anticoagulation    · Infectious Disease input noted

## 2022-01-26 VITALS
HEIGHT: 64 IN | TEMPERATURE: 98.4 F | SYSTOLIC BLOOD PRESSURE: 126 MMHG | WEIGHT: 249.12 LBS | BODY MASS INDEX: 42.53 KG/M2 | OXYGEN SATURATION: 95 % | HEART RATE: 69 BPM | RESPIRATION RATE: 20 BRPM | DIASTOLIC BLOOD PRESSURE: 53 MMHG

## 2022-01-26 PROBLEM — A41.9 SEPSIS (HCC): Status: RESOLVED | Noted: 2022-01-10 | Resolved: 2022-01-26

## 2022-01-26 PROBLEM — G93.41 METABOLIC ENCEPHALOPATHY: Status: RESOLVED | Noted: 2022-01-10 | Resolved: 2022-01-16

## 2022-01-26 LAB
ALBUMIN SERPL BCP-MCNC: 2.5 G/DL (ref 3.5–5)
ALP SERPL-CCNC: 55 U/L (ref 46–116)
ALT SERPL W P-5'-P-CCNC: 22 U/L (ref 12–78)
ANION GAP SERPL CALCULATED.3IONS-SCNC: 7 MMOL/L (ref 4–13)
AST SERPL W P-5'-P-CCNC: 20 U/L (ref 5–45)
BASOPHILS # BLD AUTO: 0.04 THOUSANDS/ΜL (ref 0–0.1)
BASOPHILS NFR BLD AUTO: 1 % (ref 0–1)
BILIRUB SERPL-MCNC: 0.67 MG/DL (ref 0.2–1)
BUN SERPL-MCNC: 22 MG/DL (ref 5–25)
CALCIUM ALBUM COR SERPL-MCNC: 9.9 MG/DL (ref 8.3–10.1)
CALCIUM SERPL-MCNC: 8.7 MG/DL (ref 8.3–10.1)
CHLORIDE SERPL-SCNC: 106 MMOL/L (ref 100–108)
CO2 SERPL-SCNC: 26 MMOL/L (ref 21–32)
CREAT SERPL-MCNC: 0.88 MG/DL (ref 0.6–1.3)
EOSINOPHIL # BLD AUTO: 0.17 THOUSAND/ΜL (ref 0–0.61)
EOSINOPHIL NFR BLD AUTO: 3 % (ref 0–6)
ERYTHROCYTE [DISTWIDTH] IN BLOOD BY AUTOMATED COUNT: 14.6 % (ref 11.6–15.1)
GFR SERPL CREATININE-BSD FRML MDRD: 65 ML/MIN/1.73SQ M
GLUCOSE SERPL-MCNC: 84 MG/DL (ref 65–140)
HCT VFR BLD AUTO: 42.2 % (ref 34.8–46.1)
HGB BLD-MCNC: 13.3 G/DL (ref 11.5–15.4)
IMM GRANULOCYTES # BLD AUTO: 0.01 THOUSAND/UL (ref 0–0.2)
IMM GRANULOCYTES NFR BLD AUTO: 0 % (ref 0–2)
LYMPHOCYTES # BLD AUTO: 1.31 THOUSANDS/ΜL (ref 0.6–4.47)
LYMPHOCYTES NFR BLD AUTO: 26 % (ref 14–44)
MCH RBC QN AUTO: 27.3 PG (ref 26.8–34.3)
MCHC RBC AUTO-ENTMCNC: 31.5 G/DL (ref 31.4–37.4)
MCV RBC AUTO: 87 FL (ref 82–98)
MONOCYTES # BLD AUTO: 0.59 THOUSAND/ΜL (ref 0.17–1.22)
MONOCYTES NFR BLD AUTO: 12 % (ref 4–12)
NEUTROPHILS # BLD AUTO: 2.84 THOUSANDS/ΜL (ref 1.85–7.62)
NEUTS SEG NFR BLD AUTO: 58 % (ref 43–75)
NRBC BLD AUTO-RTO: 0 /100 WBCS
PLATELET # BLD AUTO: 238 THOUSANDS/UL (ref 149–390)
PMV BLD AUTO: 11.4 FL (ref 8.9–12.7)
POTASSIUM SERPL-SCNC: 3.3 MMOL/L (ref 3.5–5.3)
PROT SERPL-MCNC: 6.9 G/DL (ref 6.4–8.2)
RBC # BLD AUTO: 4.88 MILLION/UL (ref 3.81–5.12)
SODIUM SERPL-SCNC: 139 MMOL/L (ref 136–145)
WBC # BLD AUTO: 4.96 THOUSAND/UL (ref 4.31–10.16)

## 2022-01-26 PROCEDURE — 85025 COMPLETE CBC W/AUTO DIFF WBC: CPT | Performed by: GENERAL PRACTICE

## 2022-01-26 PROCEDURE — 99239 HOSP IP/OBS DSCHRG MGMT >30: CPT | Performed by: GENERAL PRACTICE

## 2022-01-26 PROCEDURE — 80053 COMPREHEN METABOLIC PANEL: CPT | Performed by: GENERAL PRACTICE

## 2022-01-26 RX ORDER — LIDOCAINE 50 MG/G
1 PATCH TOPICAL DAILY
Refills: 0
Start: 2022-01-26 | End: 2022-07-12

## 2022-01-26 RX ORDER — MICONAZOLE NITRATE 20 MG/G
CREAM TOPICAL 2 TIMES DAILY
Refills: 0 | Status: ON HOLD
Start: 2022-01-26 | End: 2022-07-12 | Stop reason: CLARIF

## 2022-01-26 RX ORDER — GUAIFENESIN 600 MG
600 TABLET, EXTENDED RELEASE 12 HR ORAL EVERY 12 HOURS PRN
Refills: 0 | Status: ON HOLD
Start: 2022-01-26 | End: 2022-07-12 | Stop reason: CLARIF

## 2022-01-26 RX ORDER — POTASSIUM CHLORIDE 750 MG/1
10 TABLET, EXTENDED RELEASE ORAL DAILY
Status: DISCONTINUED | OUTPATIENT
Start: 2022-01-26 | End: 2022-01-26 | Stop reason: HOSPADM

## 2022-01-26 RX ORDER — HYDROCHLOROTHIAZIDE 12.5 MG/1
12.5 TABLET ORAL DAILY
Qty: 30 TABLET | Refills: 0
Start: 2022-01-26 | End: 2022-04-21 | Stop reason: SDUPTHER

## 2022-01-26 RX ORDER — POTASSIUM CHLORIDE 750 MG/1
10 TABLET, EXTENDED RELEASE ORAL DAILY
Refills: 0
Start: 2022-01-26 | End: 2022-03-09 | Stop reason: SDUPTHER

## 2022-01-26 RX ADMIN — POTASSIUM CHLORIDE 10 MEQ: 750 TABLET, EXTENDED RELEASE ORAL at 09:29

## 2022-01-26 RX ADMIN — METOPROLOL TARTRATE 25 MG: 25 TABLET, FILM COATED ORAL at 09:29

## 2022-01-26 RX ADMIN — Medication 1000 UNITS: at 09:29

## 2022-01-26 RX ADMIN — APIXABAN 5 MG: 5 TABLET, FILM COATED ORAL at 09:29

## 2022-01-26 RX ADMIN — GUAIFENESIN 600 MG: 600 TABLET, EXTENDED RELEASE ORAL at 09:29

## 2022-01-26 RX ADMIN — LIDOCAINE 5% 1 PATCH: 700 PATCH TOPICAL at 09:29

## 2022-01-26 RX ADMIN — PREDNISONE 5 MG: 5 TABLET ORAL at 09:29

## 2022-01-26 RX ADMIN — PANTOPRAZOLE SODIUM 40 MG: 40 TABLET, DELAYED RELEASE ORAL at 06:13

## 2022-01-26 RX ADMIN — CLOTRIMAZOLE: 1 CREAM TOPICAL at 09:31

## 2022-01-26 RX ADMIN — MICONAZOLE NITRATE: 20 CREAM TOPICAL at 09:31

## 2022-01-26 RX ADMIN — HYDROCHLOROTHIAZIDE 12.5 MG: 12.5 TABLET ORAL at 09:29

## 2022-01-26 RX ADMIN — TRIAMCINOLONE ACETONIDE: 1 OINTMENT TOPICAL at 09:31

## 2022-01-26 RX ADMIN — HYDROXYCHLOROQUINE SULFATE 200 MG: 200 TABLET, FILM COATED ORAL at 09:31

## 2022-01-26 NOTE — ASSESSMENT & PLAN NOTE
Spoke to Suzie. Informed her of  message she verbalized understanding.   · Noticed by PCP, was ordered OP echo  · IP echo-no significant valvular abnormality  · Cardiology appreciated

## 2022-01-26 NOTE — ASSESSMENT & PLAN NOTE
· Estimated Creatinine Clearance: 70 1 mL/min (by C-G formula based on SCr of 0 88 mg/dL)    · BL Cr 0 8-0 9  · Creatinine down to 0 8   · Resolved

## 2022-01-26 NOTE — ASSESSMENT & PLAN NOTE
· Patient noted to have diffuse rash on her skin  Skin is dry and peeling  · No definite diagnosis  Has been going on for the past 5 years started after being on IV antibiotics for a dental infection  · Family reported that the diagnosis of psoriasis was ruled out since the patient did not get better while on Humira for rheumatoid arthritis  · Dermatology evaluation appreciated  Erythroderma  Biopsy results show pustular psoriasis  Patient is status post shave biopsy on 01/16 and sent to [facility] for immunofluorescence    Outpatient follow-up with dermatology  · Wound and rash care per dermatology team

## 2022-01-26 NOTE — ASSESSMENT & PLAN NOTE
· Suspect from sepsis  · Head CT is unremarkable for acute pathology  · Neuro checks  · Lethargy significantly better  · Patient had fever with encephalopathy    Encephalopathy improved-likely related to fever  ·  resolved

## 2022-01-26 NOTE — DISCHARGE SUMMARY
[facility]  Discharge- Kelsey Deutscher 1950, 66 y o  female MRN: [id number]  Unit/Bed#: [facility] 807-01 Encounter: [id number]  Primary Care Provider: Fransisco Suresh DO   Date and time admitted to hospital: 1/10/2022 10:11 AM    * Sepsis (HCC)-resolved as of 1/26/2022  Assessment & Plan  · Resolved  · Evolving since admission, Patient with fever and tachycardia  · Possibly bronchitis - completed 7 days abx  · COVID/flu/RSV orginally negative  · procalcitonin now normal   · 1/2 sets of blood culture came back positive for Gram-positive cocci in pcashimt-ijeowwrew-ngkondqo Staph likely contaminant  · Urine strep antigen is positive-completed antibiotic course  · Due to recurrent low-grade fever COVID exposure with her daughter being positive and a recent visit by Daughter , COVID swab was checked on 01/19 which came back positive  · Remdesivir completed given immunocompromised state  · Maintaining O2 sats on ambient air  · Continue Eliquis for anticoagulation  · Infectious Disease input noted    COVID-19  Assessment & Plan  · Tested positive for COVID on 01/19  Her daughter visited her recently and daughter tested positive in the last 2 days  · Maintaining O2 sats on ambient air  · Placed on mild treatment protocol  · Monitor O2 sats  · Supportive care  · Asymptomatic    New onset a-fib ([address] )  Assessment & Plan  · On presentation was in afib RVR with -120s  · After IV lopressor x1, converted to NSR  · Seen by cardio -> cont lopressor 25 BID, continue p o  Eliquis  · Patient will be discharged to rehab      Murmur, cardiac  Assessment & Plan  · Noticed by PCP, was ordered OP echo  · IP echo-no significant valvular abnormality  · Cardiology appreciated    Erythroderma  Assessment & Plan  · Patient noted to have diffuse rash on her skin  Skin is dry and peeling  · No definite diagnosis    Has been going on for the past 5 years started after being on IV antibiotics for a dental infection  · Family reported that the diagnosis of psoriasis was ruled out since the patient did not get better while on Humira for rheumatoid arthritis  · Dermatology evaluation appreciated  Erythroderma  Biopsy results show pustular psoriasis  Patient is status post shave biopsy on 01/16 and sent to [facility] for immunofluorescence  Outpatient follow-up with dermatology  · Wound and rash care per dermatology team       Morbid obesity [facility])  Assessment & Plan  Body mass index is 42 76 kg/m²  Therapeutic lifestyle modification    Benign essential hypertension  Assessment & Plan  · Monitor blood pressures  · Avoid hypotension    Rheumatoid arthritis (HCC)  Assessment & Plan  · Continue plaquenil, prednisone 5 mg QD    Vomiting-resolved as of 1/16/2022  Assessment & Plan  · Resolved  Patient tolerating diet      Metabolic encephalopathy-resolved as of 1/16/2022  Assessment & Plan  · Suspect from sepsis  · Head CT is unremarkable for acute pathology  · Neuro checks  · Lethargy significantly better  · Patient had fever with encephalopathy  Encephalopathy improved-likely related to fever  ·  resolved    AMANDA (acute kidney injury) ([facility])-resolved as of 1/13/2022  Assessment & Plan  · Estimated Creatinine Clearance: 70 1 mL/min (by C-G formula based on SCr of 0 88 mg/dL)    · BL Cr 0 8-0 9  · Creatinine down to 0 8   · Resolved        Medical Problems             Resolved Problems  Date Reviewed: 1/26/2022          Resolved    AMANDA (acute kidney injury) ([facility] ) 1/13/2022     Resolved by  Mellody Nageotte, MD    * (Principal) Sepsis [facility]) 1/26/2022     Resolved by  Tanisha Ritchie DO    Metabolic encephalopathy 9/08/7186     Resolved by  Tanisha Ritchie DO    Vomiting 1/16/2022     Resolved by  Mellody Nageotte, MD              Discharging Physician / Practitioner: Tanisha Ritchie DO  PCP: Jaren Salter DO  Admission Date:   Admission Orders (From admission, onward)     Ordered        01/10/22 1216  Inpatient Admission Once                      Discharge Date: 01/26/22    Consultations During Hospital Stay:  · Cardio  · Pharmacy  · Derm  · ID    Procedures Performed:   · Right arm punch biopsies 1/12     Significant Findings / Test Results:   · See above    Incidental Findings:   · none     Test Results Pending at Discharge (will require follow up):   · none     Outpatient Tests Requested:  · Per rehab    Complications:  Pt got COVID likely from dtr who visited    Reason for Admission: new onset Afib and sepsis    Hospital Course:   Emir Griffin is a 66 y o  female patient who originally presented to the hospital on 1/10/2022 due to weakness and cough  Sepsis 2/2 bronchitis - completed 7 days abx  Got skin biopsy suspicious for pustular psoriasis  Will f/u OP w/ derm  Rash improved w/ triamcinolone topl bid  Tested positive for COVID 1/19 - given Remsesivir as she is immunocompromised  Asymptomatic from COVID and not hypoxic  Please see above list of diagnoses and related plan for additional information  Condition at Discharge: stable    Discharge Day Visit / Exam:   Subjective:  No acute complaints  Vitals: Blood Pressure: 126/53 (01/26/22 0622)  Pulse: 69 (01/26/22 0622)  Temperature: 98 4 °F (36 9 °C) (01/26/22 0622)  Temp Source: Oral (01/25/22 2122)  Respirations: 20 (01/26/22 0622)  Height: 5' 4" (162 6 cm) (01/11/22 1201)  Weight - Scale: 113 kg (249 lb 1 9 oz) (01/19/22 0600)  SpO2: 95 % (01/26/22 0622)  Exam:   Physical Exam  HENT:      Head: Normocephalic and atraumatic  Nose: Nose normal       Mouth/Throat:      Mouth: Mucous membranes are moist    Eyes:      Extraocular Movements: Extraocular movements intact  Conjunctiva/sclera: Conjunctivae normal    Cardiovascular:      Rate and Rhythm: Normal rate and regular rhythm  Pulmonary:      Effort: Pulmonary effort is normal       Breath sounds: Normal breath sounds  No wheezing or rales     Abdominal:      General: Bowel sounds are normal  There is no distension  Palpations: Abdomen is soft  Tenderness: There is no abdominal tenderness  Musculoskeletal:         General: Normal range of motion  Cervical back: Normal range of motion and neck supple  Right lower leg: No edema  Left lower leg: No edema  Skin:     General: Skin is warm and dry  Neurological:      Mental Status: She is alert and oriented to person, place, and time  Discussion with Family: I spoke to family yesterday  CM spoke to family today  Discharge instructions/Information to patient and family:   See after visit summary for information provided to patient and family  Provisions for Follow-Up Care:  See after visit summary for information related to follow-up care and any pertinent home health orders  Disposition:   [place] at [facility]    Planned Readmission: no     Discharge Statement:  I spent 35 minutes discharging the patient  This time was spent on the day of discharge  I had direct contact with the patient on the day of discharge  Greater than 50% of the total time was spent examining patient, answering all patient questions, arranging and discussing plan of care with patient as well as directly providing post-discharge instructions  Additional time then spent on discharge activities  Discharge Medications:  See after visit summary for reconciled discharge medications provided to patient and/or family        **Please Note: This note may have been constructed using a voice recognition system**

## 2022-01-26 NOTE — ASSESSMENT & PLAN NOTE
· Resolved  · Evolving since admission, Patient with fever and tachycardia  · Possibly bronchitis - completed 7 days abx  · COVID/flu/RSV orginally negative  · procalcitonin now normal   · 1/2 sets of blood culture came back positive for Gram-positive cocci in wbmxszcc-bzwrrzlya-wgafnadw Staph likely contaminant  · Urine strep antigen is positive-completed antibiotic course  · Due to recurrent low-grade fever COVID exposure with her daughter being positive and a recent visit by Daughter , COVID swab was checked on 01/19 which came back positive  · Remdesivir completed given immunocompromised state  · Maintaining O2 sats on ambient air  · Continue Eliquis for anticoagulation    · Infectious Disease input noted

## 2022-01-26 NOTE — UTILIZATION REVIEW
Notification of Discharge   This is a Notification of Discharge from our facility [address]  Please be advised that this patient has been discharge from our facility  Below you will find the admission and discharge date and time including the patients disposition  UTILIZATION REVIEW CONTACT:  Magdy Felder  Utilization   Network Utilization Review Department  Phone: [phone] x carefully listen to the prompts  All voicemails are confidential   Email: [email or web address]     PHYSICIAN ADVISORY SERVICES:  FOR SAQA-UP-GMLM REVIEW - MEDICAL NECESSITY DENIAL  Phone: [phone]  Fax: [phone]  Email: [email or web address]     PRESENTATION DATE: 1/10/2022 10:11 AM  OBERVATION ADMISSION DATE:   INPATIENT ADMISSION DATE: 1/10/22 12:16 PM   DISCHARGE DATE: 1/26/2022 12:05 PM  DISPOSITION: Non [facility] Non [facility]      IMPORTANT INFORMATION:  Send all requests for admission clinical reviews, approved or denied determinations and any other requests to dedicated fax number below belonging to the campus where the patient is receiving treatment   List of dedicated fax numbers:  [address] [place] [address] DENIALS (Administrative/Medical Necessity) [phone]   [address]  (Maternity/NICU/Pediatrics) [phone]   Yolanda Calderon [phone]   Agatha Felipe [phone]   [address] [place] [address]  [phone]   [address] [place] [address],[address]Th [address] [phone]   [facility]  [phone]   [address] [place] [address], [facility]  [address] [facility] [address] [facility] [phone]

## 2022-01-26 NOTE — PLAN OF CARE
Problem: Nutrition/Hydration-ADULT  Goal: Nutrient/Hydration intake appropriate for improving, restoring or maintaining nutritional needs  Description: Monitor and assess patient's nutrition/hydration status for malnutrition  Collaborate with interdisciplinary team and initiate plan and interventions as ordered  Monitor patient's weight and dietary intake as ordered or per policy  Utilize nutrition screening tool and intervene as necessary  Determine patient's food preferences and provide high-protein, high-caloric foods as appropriate       INTERVENTIONS:  - Monitor oral intake, urinary output, labs, and treatment plans  - Assess nutrition and hydration status and recommend course of action  - Evaluate amount of meals eaten  - Assist patient with eating if necessary   - Allow adequate time for meals  - Recommend/ encourage appropriate diets, oral nutritional supplements, and vitamin/mineral supplements  - Order, calculate, and assess calorie counts as needed  - Recommend, monitor, and adjust tube feedings and TPN/PPN based on assessed needs  - Assess need for intravenous fluids  - Provide specific nutrition/hydration education as appropriate  - Include patient/family/caregiver in decisions related to nutrition  Outcome: Progressing     Problem: PAIN - ADULT  Goal: Verbalizes/displays adequate comfort level or baseline comfort level  Description: Interventions:  - Encourage patient to monitor pain and request assistance  - Assess pain using appropriate pain scale  - Administer analgesics based on type and severity of pain and evaluate response  - Implement non-pharmacological measures as appropriate and evaluate response  - Consider cultural and social influences on pain and pain management  - Notify physician/advanced practitioner if interventions unsuccessful or patient reports new pain  Outcome: Progressing     Problem: INFECTION - ADULT  Goal: Absence or prevention of progression during hospitalization  Description: INTERVENTIONS:  - Assess and monitor for signs and symptoms of infection  - Monitor lab/diagnostic results  - Monitor all insertion sites, i e  indwelling lines, tubes, and drains  - Monitor endotracheal if appropriate and nasal secretions for changes in amount and color  - [place] appropriate cooling/warming therapies per order  - Administer medications as ordered  - Instruct and encourage patient and family to use good hand hygiene technique  - Identify and instruct in appropriate isolation precautions for identified infection/condition  Outcome: Progressing     Problem: DISCHARGE PLANNING  Goal: Discharge to home or other facility with appropriate resources  Description: INTERVENTIONS:  - Identify barriers to discharge w/patient and caregiver  - Arrange for needed discharge resources and transportation as appropriate  - Identify discharge learning needs (meds, wound care, etc )  - Arrange for interpretive services to assist at discharge as needed  - Refer to Case Management Department for coordinating discharge planning if the patient needs post-hospital services based on physician/advanced practitioner order or complex needs related to functional status, cognitive ability, or social support system  Outcome: Progressing     Problem: Knowledge Deficit  Goal: Patient/family/caregiver demonstrates understanding of disease process, treatment plan, medications, and discharge instructions  Description: Complete learning assessment and assess knowledge base    Interventions:  - Provide teaching at level of understanding  - Provide teaching via preferred learning methods  Outcome: Progressing

## 2022-01-26 NOTE — ASSESSMENT & PLAN NOTE
· Tested positive for COVID on 01/19  Her daughter visited her recently and daughter tested positive in the last 2 days    · Maintaining O2 sats on ambient air  · Placed on mild treatment protocol  · Monitor O2 sats  · Supportive care  · Asymptomatic

## 2022-01-28 ENCOUNTER — TELEPHONE (OUTPATIENT)
Dept: DERMATOLOGY | Facility: CLINIC | Age: 72
End: 2022-01-28

## 2022-01-28 NOTE — TELEPHONE ENCOUNTER
Called pt to schedule HFU but n/a and her vm box was full with no memory to take another message, cb is needed

## 2022-01-28 NOTE — UTILIZATION REVIEW
Notification of Discharge   This is a Notification of Discharge from our facility [address]  Please be advised that this patient has been discharge from our facility  Below you will find the admission and discharge date and time including the patients disposition  UTILIZATION REVIEW CONTACT:  Lian Conrad  Utilization   Network Utilization Review Department  Phone: [phone] x carefully listen to the prompts  All voicemails are confidential   Email: [email or web address]     PHYSICIAN ADVISORY SERVICES:  FOR HOZQ-LV-SOKI REVIEW - MEDICAL NECESSITY DENIAL  Phone: [phone]  Fax: [phone]  Email: [email or web address]     PRESENTATION DATE: 1/10/2022 10:11 AM  OBERVATION ADMISSION DATE:   INPATIENT ADMISSION DATE: 1/10/22 12:16 PM   DISCHARGE DATE: 1/26/2022 12:05 PM  DISPOSITION: Non [facility] Non [facility]      IMPORTANT INFORMATION:  Send all requests for admission clinical reviews, approved or denied determinations and any other requests to dedicated fax number below belonging to the campus where the patient is receiving treatment   List of dedicated fax numbers:  [address] [place] [address] DENIALS (Administrative/Medical Necessity) [phone]   [address]  (Maternity/NICU/Pediatrics) [phone]   Estrella Fines [phone]   Kiki Oms [phone]   Tiny Swedish [phone]   [address] [place] [address],4Th Floor [address] [phone]   [facility]  [phone]   [address] [place] [address], [facility]  [address] [facility] 1000 [address] [facility] [phone]

## 2022-01-31 ENCOUNTER — TELEPHONE (OUTPATIENT)
Dept: DERMATOLOGY | Facility: CLINIC | Age: 72
End: 2022-01-31

## 2022-01-31 NOTE — TELEPHONE ENCOUNTER
elisabeth recd; Hi my name is Wayne Johansen  I'm one of the nurses over at [facility] in [place]  I'm calling in regards to Haider Love date of birth [id number] 48 we were told to give you guys a call to see when her follow up is she's got sutures in her right shoulder from a biopsy  We don't know if they want to be removed here or her next appointment, so if you can give us a call back [phone] and ask for the first floor  Thank you  Returned call; spoke with Leanna Montanez  Informed her pt will need a hospital follow up     Appt scheduled with scheduling dept for 2/4 at 2pm with dr Juju Mendoza in [place]

## 2022-02-07 DIAGNOSIS — K21.9 GASTROESOPHAGEAL REFLUX DISEASE WITHOUT ESOPHAGITIS: ICD-10-CM

## 2022-02-07 RX ORDER — OMEPRAZOLE 20 MG/1
CAPSULE, DELAYED RELEASE ORAL
Qty: 90 CAPSULE | Refills: 3 | Status: SHIPPED | OUTPATIENT
Start: 2022-02-07

## 2022-02-11 ENCOUNTER — TELEPHONE (OUTPATIENT)
Dept: DERMATOLOGY | Facility: CLINIC | Age: 72
End: 2022-02-11

## 2022-02-11 NOTE — TELEPHONE ENCOUNTER
Visiting nurse called asking if you would like her to take the sutures out that are on the patients right upper arm, you seen the patient in the hospital and did some biopsies, visiting nurse states the patient is on the second floor of her house and can't get down, it will be really hard for her to get into the office

## 2022-02-14 ENCOUNTER — TELEPHONE (OUTPATIENT)
Dept: INTERNAL MEDICINE CLINIC | Facility: CLINIC | Age: 72
End: 2022-02-14

## 2022-02-14 NOTE — TELEPHONE ENCOUNTER
Called patient and she states she's unable to leave the house due to not being able to walk  Patient said she will reach out to the rehab facility to see if they can provide the scripts for her

## 2022-02-14 NOTE — TELEPHONE ENCOUNTER
Jamie the visiting nurse called, he states that patient needs a bedside commode and walker scripts sent to [facility]

## 2022-02-14 NOTE — TELEPHONE ENCOUNTER
Did the discharging physician from rehab not provide patient with DME scripts? Also, will need face-to-face appt with patient    Thanks

## 2022-02-14 NOTE — TELEPHONE ENCOUNTER
Please call patient/daughter/visiting nurse  OK to remove sutures  Would need follow up for continued care of her rash, if desired

## 2022-02-14 NOTE — TELEPHONE ENCOUNTER
Tried to call patients daughter to let her know it is okay for the visiting nurse to remove the sutures, patient would need a follow up for continued care of the rash, if desired  Unable to leave voicemail for the patients daughter

## 2022-02-18 NOTE — TELEPHONE ENCOUNTER
Spoke to patients Daughter Candi Cassette and informed her per Dr Mayo Frankston note ok to have sutures removed by visiting nurse and for patient to follow up as needed for continued care of rash

## 2022-03-09 ENCOUNTER — APPOINTMENT (EMERGENCY)
Dept: RADIOLOGY | Facility: HOSPITAL | Age: 72
DRG: 683 | End: 2022-03-09
Payer: COMMERCIAL

## 2022-03-09 ENCOUNTER — HOSPITAL ENCOUNTER (INPATIENT)
Facility: HOSPITAL | Age: 72
LOS: 6 days | Discharge: HOME HEALTH | DRG: 683 | End: 2022-03-15
Attending: EMERGENCY MEDICINE | Admitting: INTERNAL MEDICINE
Payer: COMMERCIAL

## 2022-03-09 DIAGNOSIS — R77.8 ELEVATED TROPONIN I MEASUREMENT: ICD-10-CM

## 2022-03-09 DIAGNOSIS — L40.9 PSORIASIS: ICD-10-CM

## 2022-03-09 DIAGNOSIS — R11.2 NAUSEA AND VOMITING: ICD-10-CM

## 2022-03-09 DIAGNOSIS — R05.9 COUGH: ICD-10-CM

## 2022-03-09 DIAGNOSIS — R39.89 SUSPECTED UTI: Primary | ICD-10-CM

## 2022-03-09 DIAGNOSIS — L53.9 ERYTHRODERMA: ICD-10-CM

## 2022-03-09 PROBLEM — R79.89 ELEVATED TROPONIN: Status: ACTIVE | Noted: 2022-03-09

## 2022-03-09 PROBLEM — I48.0 PAROXYSMAL ATRIAL FIBRILLATION (HCC): Status: ACTIVE | Noted: 2022-01-10

## 2022-03-09 LAB
2HR DELTA HS TROPONIN: 6 NG/L
4HR DELTA HS TROPONIN: 4 NG/L
ALBUMIN SERPL BCP-MCNC: 2.6 G/DL (ref 3.5–5)
ALP SERPL-CCNC: 52 U/L (ref 46–116)
ALT SERPL W P-5'-P-CCNC: 15 U/L (ref 12–78)
ANION GAP SERPL CALCULATED.3IONS-SCNC: 10 MMOL/L (ref 4–13)
APTT PPP: 33 SECONDS (ref 23–37)
AST SERPL W P-5'-P-CCNC: 22 U/L (ref 5–45)
ATRIAL RATE: 108 BPM
BACTERIA UR QL AUTO: ABNORMAL /HPF
BASOPHILS # BLD AUTO: 0.02 THOUSANDS/ΜL (ref 0–0.1)
BASOPHILS NFR BLD AUTO: 0 % (ref 0–1)
BILIRUB SERPL-MCNC: 0.89 MG/DL (ref 0.2–1)
BILIRUB UR QL STRIP: NEGATIVE
BUN SERPL-MCNC: 9 MG/DL (ref 5–25)
CALCIUM ALBUM COR SERPL-MCNC: 10.5 MG/DL (ref 8.3–10.1)
CALCIUM SERPL-MCNC: 9.4 MG/DL (ref 8.3–10.1)
CARDIAC TROPONIN I PNL SERPL HS: 64 NG/L
CARDIAC TROPONIN I PNL SERPL HS: 68 NG/L
CARDIAC TROPONIN I PNL SERPL HS: 70 NG/L
CHLORIDE SERPL-SCNC: 110 MMOL/L (ref 100–108)
CLARITY UR: CLEAR
CO2 SERPL-SCNC: 22 MMOL/L (ref 21–32)
COLOR UR: YELLOW
CREAT SERPL-MCNC: 1.54 MG/DL (ref 0.6–1.3)
EOSINOPHIL # BLD AUTO: 0.23 THOUSAND/ΜL (ref 0–0.61)
EOSINOPHIL NFR BLD AUTO: 3 % (ref 0–6)
ERYTHROCYTE [DISTWIDTH] IN BLOOD BY AUTOMATED COUNT: 15.1 % (ref 11.6–15.1)
FLUAV RNA RESP QL NAA+PROBE: NEGATIVE
FLUBV RNA RESP QL NAA+PROBE: NEGATIVE
GFR SERPL CREATININE-BSD FRML MDRD: 33 ML/MIN/1.73SQ M
GLUCOSE SERPL-MCNC: 109 MG/DL (ref 65–140)
GLUCOSE UR STRIP-MCNC: NEGATIVE MG/DL
HCT VFR BLD AUTO: 39.8 % (ref 34.8–46.1)
HGB BLD-MCNC: 13 G/DL (ref 11.5–15.4)
HGB UR QL STRIP.AUTO: NEGATIVE
HYALINE CASTS #/AREA URNS LPF: ABNORMAL /LPF
IMM GRANULOCYTES # BLD AUTO: 0.04 THOUSAND/UL (ref 0–0.2)
IMM GRANULOCYTES NFR BLD AUTO: 1 % (ref 0–2)
INR PPP: 1.53 (ref 0.84–1.19)
KETONES UR STRIP-MCNC: NEGATIVE MG/DL
LACTATE SERPL-SCNC: 1.5 MMOL/L (ref 0.5–2)
LACTATE SERPL-SCNC: 2.8 MMOL/L (ref 0.5–2)
LEUKOCYTE ESTERASE UR QL STRIP: ABNORMAL
LYMPHOCYTES # BLD AUTO: 1.1 THOUSANDS/ΜL (ref 0.6–4.47)
LYMPHOCYTES NFR BLD AUTO: 14 % (ref 14–44)
MCH RBC QN AUTO: 26.9 PG (ref 26.8–34.3)
MCHC RBC AUTO-ENTMCNC: 32.7 G/DL (ref 31.4–37.4)
MCV RBC AUTO: 82 FL (ref 82–98)
MONOCYTES # BLD AUTO: 0.63 THOUSAND/ΜL (ref 0.17–1.22)
MONOCYTES NFR BLD AUTO: 8 % (ref 4–12)
NEUTROPHILS # BLD AUTO: 5.91 THOUSANDS/ΜL (ref 1.85–7.62)
NEUTS SEG NFR BLD AUTO: 74 % (ref 43–75)
NITRITE UR QL STRIP: NEGATIVE
NON-SQ EPI CELLS URNS QL MICRO: ABNORMAL /HPF
NRBC BLD AUTO-RTO: 0 /100 WBCS
NT-PROBNP SERPL-MCNC: 1351 PG/ML
P AXIS: 83 DEGREES
PH UR STRIP.AUTO: 5 [PH]
PLATELET # BLD AUTO: 193 THOUSANDS/UL (ref 149–390)
PMV BLD AUTO: 12 FL (ref 8.9–12.7)
POTASSIUM SERPL-SCNC: 4 MMOL/L (ref 3.5–5.3)
PR INTERVAL: 126 MS
PROCALCITONIN SERPL-MCNC: 0.18 NG/ML
PROT SERPL-MCNC: 7 G/DL (ref 6.4–8.2)
PROT UR STRIP-MCNC: ABNORMAL MG/DL
PROTHROMBIN TIME: 17.7 SECONDS (ref 11.6–14.5)
QRS AXIS: 41 DEGREES
QRSD INTERVAL: 86 MS
QT INTERVAL: 388 MS
QTC INTERVAL: 519 MS
RBC # BLD AUTO: 4.83 MILLION/UL (ref 3.81–5.12)
RBC #/AREA URNS AUTO: ABNORMAL /HPF
RSV RNA RESP QL NAA+PROBE: NEGATIVE
SARS-COV-2 RNA RESP QL NAA+PROBE: NEGATIVE
SODIUM SERPL-SCNC: 142 MMOL/L (ref 136–145)
SP GR UR STRIP.AUTO: 1.02 (ref 1–1.03)
T WAVE AXIS: 64 DEGREES
UROBILINOGEN UR STRIP-ACNC: <2 MG/DL
VENTRICULAR RATE: 108 BPM
WBC # BLD AUTO: 7.93 THOUSAND/UL (ref 4.31–10.16)
WBC #/AREA URNS AUTO: ABNORMAL /HPF

## 2022-03-09 PROCEDURE — 84145 PROCALCITONIN (PCT): CPT

## 2022-03-09 PROCEDURE — 0241U HB NFCT DS VIR RESP RNA 4 TRGT: CPT

## 2022-03-09 PROCEDURE — 93005 ELECTROCARDIOGRAM TRACING: CPT

## 2022-03-09 PROCEDURE — 96361 HYDRATE IV INFUSION ADD-ON: CPT

## 2022-03-09 PROCEDURE — 99223 1ST HOSP IP/OBS HIGH 75: CPT | Performed by: INTERNAL MEDICINE

## 2022-03-09 PROCEDURE — 96365 THER/PROPH/DIAG IV INF INIT: CPT

## 2022-03-09 PROCEDURE — 36415 COLL VENOUS BLD VENIPUNCTURE: CPT

## 2022-03-09 PROCEDURE — 99285 EMERGENCY DEPT VISIT HI MDM: CPT | Performed by: EMERGENCY MEDICINE

## 2022-03-09 PROCEDURE — 93010 ELECTROCARDIOGRAM REPORT: CPT | Performed by: INTERNAL MEDICINE

## 2022-03-09 PROCEDURE — 85025 COMPLETE CBC W/AUTO DIFF WBC: CPT

## 2022-03-09 PROCEDURE — 71045 X-RAY EXAM CHEST 1 VIEW: CPT

## 2022-03-09 PROCEDURE — 80053 COMPREHEN METABOLIC PANEL: CPT

## 2022-03-09 PROCEDURE — 99285 EMERGENCY DEPT VISIT HI MDM: CPT

## 2022-03-09 PROCEDURE — 87040 BLOOD CULTURE FOR BACTERIA: CPT

## 2022-03-09 PROCEDURE — 85610 PROTHROMBIN TIME: CPT

## 2022-03-09 PROCEDURE — 83880 ASSAY OF NATRIURETIC PEPTIDE: CPT

## 2022-03-09 PROCEDURE — 85730 THROMBOPLASTIN TIME PARTIAL: CPT

## 2022-03-09 PROCEDURE — 81001 URINALYSIS AUTO W/SCOPE: CPT

## 2022-03-09 PROCEDURE — 83605 ASSAY OF LACTIC ACID: CPT

## 2022-03-09 PROCEDURE — 96375 TX/PRO/DX INJ NEW DRUG ADDON: CPT

## 2022-03-09 PROCEDURE — 84484 ASSAY OF TROPONIN QUANT: CPT

## 2022-03-09 PROCEDURE — 84484 ASSAY OF TROPONIN QUANT: CPT | Performed by: INTERNAL MEDICINE

## 2022-03-09 RX ORDER — ONDANSETRON 2 MG/ML
4 INJECTION INTRAMUSCULAR; INTRAVENOUS ONCE
Status: COMPLETED | OUTPATIENT
Start: 2022-03-09 | End: 2022-03-09

## 2022-03-09 RX ORDER — ASPIRIN 325 MG
325 TABLET ORAL ONCE
Status: COMPLETED | OUTPATIENT
Start: 2022-03-09 | End: 2022-03-09

## 2022-03-09 RX ORDER — PANTOPRAZOLE SODIUM 40 MG/1
40 TABLET, DELAYED RELEASE ORAL
Status: DISCONTINUED | OUTPATIENT
Start: 2022-03-10 | End: 2022-03-15 | Stop reason: HOSPADM

## 2022-03-09 RX ORDER — PREDNISONE 1 MG/1
5 TABLET ORAL 2 TIMES DAILY WITH MEALS
Status: DISCONTINUED | OUTPATIENT
Start: 2022-03-10 | End: 2022-03-15 | Stop reason: HOSPADM

## 2022-03-09 RX ORDER — SODIUM CHLORIDE, SODIUM GLUCONATE, SODIUM ACETATE, POTASSIUM CHLORIDE, MAGNESIUM CHLORIDE, SODIUM PHOSPHATE, DIBASIC, AND POTASSIUM PHOSPHATE .53; .5; .37; .037; .03; .012; .00082 G/100ML; G/100ML; G/100ML; G/100ML; G/100ML; G/100ML; G/100ML
75 INJECTION, SOLUTION INTRAVENOUS CONTINUOUS
Status: DISCONTINUED | OUTPATIENT
Start: 2022-03-09 | End: 2022-03-11

## 2022-03-09 RX ORDER — BENZONATATE 100 MG/1
100 CAPSULE ORAL 3 TIMES DAILY PRN
Status: DISCONTINUED | OUTPATIENT
Start: 2022-03-09 | End: 2022-03-15 | Stop reason: HOSPADM

## 2022-03-09 RX ORDER — ONDANSETRON 2 MG/ML
4 INJECTION INTRAMUSCULAR; INTRAVENOUS ONCE
Status: CANCELLED | OUTPATIENT
Start: 2022-03-09 | End: 2022-03-09

## 2022-03-09 RX ORDER — LIDOCAINE 50 MG/G
1 PATCH TOPICAL DAILY
Status: DISCONTINUED | OUTPATIENT
Start: 2022-03-09 | End: 2022-03-15 | Stop reason: HOSPADM

## 2022-03-09 RX ORDER — HYDROXYCHLOROQUINE SULFATE 200 MG/1
200 TABLET, FILM COATED ORAL 2 TIMES DAILY
Status: DISCONTINUED | OUTPATIENT
Start: 2022-03-09 | End: 2022-03-15 | Stop reason: HOSPADM

## 2022-03-09 RX ORDER — GUAIFENESIN 600 MG
600 TABLET, EXTENDED RELEASE 12 HR ORAL EVERY 12 HOURS PRN
Status: DISCONTINUED | OUTPATIENT
Start: 2022-03-09 | End: 2022-03-15 | Stop reason: HOSPADM

## 2022-03-09 RX ORDER — ONDANSETRON 2 MG/ML
4 INJECTION INTRAMUSCULAR; INTRAVENOUS EVERY 6 HOURS PRN
Status: DISCONTINUED | OUTPATIENT
Start: 2022-03-09 | End: 2022-03-15 | Stop reason: HOSPADM

## 2022-03-09 RX ORDER — ACETAMINOPHEN 160 MG/5ML
1 SUSPENSION, ORAL (FINAL DOSE FORM) ORAL ONCE
Status: COMPLETED | OUTPATIENT
Start: 2022-03-09 | End: 2022-03-09

## 2022-03-09 RX ORDER — ACETAMINOPHEN 325 MG/1
650 TABLET ORAL EVERY 4 HOURS PRN
Status: DISCONTINUED | OUTPATIENT
Start: 2022-03-09 | End: 2022-03-15 | Stop reason: HOSPADM

## 2022-03-09 RX ORDER — MELATONIN
1000 DAILY
Status: DISCONTINUED | OUTPATIENT
Start: 2022-03-10 | End: 2022-03-15 | Stop reason: HOSPADM

## 2022-03-09 RX ADMIN — SODIUM CHLORIDE 1000 ML: 0.9 INJECTION, SOLUTION INTRAVENOUS at 19:36

## 2022-03-09 RX ADMIN — SODIUM CHLORIDE 1000 ML: 0.9 INJECTION, SOLUTION INTRAVENOUS at 17:36

## 2022-03-09 RX ADMIN — ONDANSETRON 4 MG: 2 INJECTION INTRAMUSCULAR; INTRAVENOUS at 17:36

## 2022-03-09 RX ADMIN — ASPIRIN 325 MG ORAL TABLET 325 MG: 325 PILL ORAL at 17:53

## 2022-03-09 RX ADMIN — MORPHINE SULFATE 2 MG: 2 INJECTION, SOLUTION INTRAMUSCULAR; INTRAVENOUS at 21:38

## 2022-03-09 RX ADMIN — CEFEPIME HYDROCHLORIDE 2000 MG: 2 INJECTION, POWDER, FOR SOLUTION INTRAVENOUS at 19:37

## 2022-03-09 NOTE — ED PROVIDER NOTES
History  Chief Complaint   Patient presents with    Medical Problem     Per EMS pt reports fever and chills  En route temp was 101 5  Possible hx of afib  got 650mg NACL of and 650mg Tylenol  Reports possible fall yesterday  Patient is a 66year old female with PMHx A-fib, bronchitis, RA, sarcoidosis, HTN, presenting to the ED for evaluation of fever and vomiting  Patient is a poor historian and most history gathered by patient's daughter at bedside  Daughter states that for the past 2 days patient has had increasingly worsening fatigue, decreased appetite, as well as progressively worsening cough productive of phlegm, as well as 2 days of intermittent vomiting  Yesterday, patient had a fever and chills, which continued today T-max 101 5  Patient's visiting nurse called EMS and patient was transferred to the ED  EN route patient received Tylenol 650 mg p o  Per patient, she does endorse a sliding out of bed last night for and slumping to the floor  She denies head trauma and LOC  She called out for her daughter, who promptly responded and with help assisted patient back into bed  Per chart review, patient was admitted at the end of January for evaluation of productive cough, found to be septic  Patient's urine antigens for strep came back positive as well  She also contracted COVID while in the hospital at the end of January  Patient denies chest pain, shortness of breath, dizziness, headache, vision changes, abdominal pain, hematemesis, melena, diarrhea, urinary complaints  Patient does have difficulty with ambulation at bedside  Prior to Admission Medications   Prescriptions Last Dose Informant Patient Reported? Taking?    Adalimumab (HUMIRA PEN) 40 MG/0 8ML PNKT   Yes Yes   Sig: Inject 40 mg under the skin every 14 (fourteen) days   Clobetasol Propionate E 0 05 % emollient cream   Yes Yes   Sig: APPLY TWICE A DAY FOR PALM SKIN DERMITITS   Miconazole Nitrate (Manjeet Antifungal) 2 % cream   No Yes   Sig: Apply topically 2 (two) times a day To sacrum   acetaminophen (TYLENOL) 325 mg tablet   No Yes   Sig: Take 2 tablets (650 mg total) by mouth every 4 (four) hours as needed for mild pain, headaches or fever  alendronate (FOSAMAX) 70 mg tablet   Yes Yes   Sig: Take by mouth every 7 days    apixaban (ELIQUIS) 5 mg   No Yes   Sig: Take 1 tablet (5 mg total) by mouth 2 (two) times a day   cholecalciferol (VITAMIN D3) 1,000 units tablet  Self Yes Yes   Sig: Take 1,000 Units by mouth daily   guaiFENesin (MUCINEX) 600 mg 12 hr tablet   No Yes   Sig: Take 1 tablet (600 mg total) by mouth every 12 (twelve) hours as needed for cough or congestion   hydrochlorothiazide (HYDRODIURIL) 12 5 mg tablet   No Yes   Sig: Take 1 tablet (12 5 mg total) by mouth daily   hydroxychloroquine (PLAQUENIL) 200 mg tablet   Yes Yes   Sig: Take 200 mg by mouth 2 (two) times a day    ketoconazole (NIZORAL) 2 % cream   No Yes   Sig: Apply topically 2 (two) times a day   lidocaine (LIDODERM) 5 %   No Yes   Sig: Apply 1 patch topically daily Remove & Discard patch within 12 hours or as directed by MD   metoprolol tartrate (LOPRESSOR) 25 mg tablet   No Yes   Sig: Take 1 tablet (25 mg total) by mouth every 12 (twelve) hours   omeprazole (PriLOSEC) 20 mg delayed release capsule   No Yes   Sig: TAKE 1 CAPSULE DAILY   potassium chloride (K-DUR,KLOR-CON) 10 mEq tablet   No Yes   Sig: Take 1 tablet (10 mEq total) by mouth daily   predniSONE 5 mg tablet   Yes Yes   Sig: Take 5 mg by mouth daily   1 mg prednisone 2 x/day   triamcinolone (KENALOG) 0 1 % cream   Yes Yes   Sig: Apply 1 application topically 2 (two) times a day To affected area      Facility-Administered Medications: None       Past Medical History:   Diagnosis Date    Abnormal thyroid function test     last assessed: Sept 25, 2015     Arthritis     Caries     last assessed: Sept 9, 2016     Edema of right lower extremity     last assessed: March 18, 2015     GERD (gastroesophageal reflux disease)     Hypertension     Sarcoid        Past Surgical History:   Procedure Laterality Date     SECTION      MULTIPLE TOOTH EXTRACTIONS N/A 2016    Procedure: Surgical extraction of teeth 2, 18, 23, 27, 32; incision and drainage of left subperiosteal abscess ;  Surgeon: Corby Flood DMD;  Location:  MAIN OR;  Service:        Family History   Problem Relation Age of Onset    Asthma Mother     Stroke Mother     No Known Problems Father     No Known Problems Sister     No Known Problems Brother     No Known Problems Maternal Grandmother     No Known Problems Maternal Grandfather     No Known Problems Paternal Grandmother     No Known Problems Paternal Grandfather     Diabetes Maternal Aunt     Breast cancer Cousin     Diabetes Cousin     Breast cancer Other      I have reviewed and agree with the history as documented  E-Cigarette/Vaping     E-Cigarette/Vaping Substances     Social History     Tobacco Use    Smoking status: Never Smoker    Smokeless tobacco: Never Used   Substance Use Topics    Alcohol use: No    Drug use: No        Review of Systems   Constitutional: Positive for appetite change, fatigue and fever  Negative for chills  HENT: Negative for congestion, ear pain, sinus pressure, sinus pain and sore throat  Eyes: Negative for pain and visual disturbance  Respiratory: Positive for cough  Negative for shortness of breath and wheezing  Cardiovascular: Negative for chest pain and palpitations  Gastrointestinal: Positive for nausea and vomiting  Negative for abdominal pain, anal bleeding and diarrhea  Genitourinary: Positive for frequency  Negative for difficulty urinating, dysuria, flank pain, hematuria and urgency  Musculoskeletal: Positive for arthralgias and myalgias  Negative for back pain, neck pain and neck stiffness  Skin: Positive for rash (psoriasis)  Negative for color change     Neurological: Negative for dizziness, seizures, syncope, facial asymmetry, weakness, light-headedness and headaches  All other systems reviewed and are negative  Physical Exam  ED Triage Vitals   Temperature Pulse Respirations Blood Pressure SpO2   03/09/22 1634 03/09/22 1634 03/09/22 1634 03/09/22 1640 03/09/22 1637   99 4 °F (37 4 °C) 71 20 141/60 100 %      Temp Source Heart Rate Source Patient Position - Orthostatic VS BP Location FiO2 (%)   03/09/22 1634 03/09/22 1634 03/09/22 1634 03/09/22 1634 --   Oral Monitor Lying Right arm       Pain Score       03/09/22 1700       No Pain             Orthostatic Vital Signs  Vitals:    03/09/22 1634 03/09/22 1640 03/09/22 1930   BP:  141/60 140/68   Pulse: 71  98   Patient Position - Orthostatic VS: Lying  Lying       Physical Exam  Vitals and nursing note reviewed  Constitutional:       General: She is not in acute distress  Appearance: Normal appearance  She is well-developed  She is obese  She is not ill-appearing or toxic-appearing  HENT:      Head: Normocephalic and atraumatic  Right Ear: External ear normal       Left Ear: External ear normal       Nose: Nose normal  No congestion or rhinorrhea  Mouth/Throat:      Mouth: Mucous membranes are dry  Eyes:      General: No scleral icterus  Extraocular Movements: Extraocular movements intact  Conjunctiva/sclera: Conjunctivae normal       Pupils: Pupils are equal, round, and reactive to light  Cardiovascular:      Rate and Rhythm: Regular rhythm  Tachycardia present  Pulses: Normal pulses  Heart sounds: Normal heart sounds  No murmur heard  Pulmonary:      Effort: Pulmonary effort is normal  No respiratory distress  Breath sounds: Normal breath sounds  No wheezing, rhonchi or rales  Chest:      Chest wall: No tenderness  Abdominal:      General: Abdomen is flat  Bowel sounds are normal       Palpations: Abdomen is soft  Tenderness: There is no abdominal tenderness   There is no guarding or rebound  Musculoskeletal:         General: No swelling  Cervical back: Normal range of motion and neck supple  No rigidity or tenderness  Comments: Tenderness to the knees, wrists, and ankles; chronic according to patient due to arthritis   Skin:     General: Skin is warm and dry  Capillary Refill: Capillary refill takes less than 2 seconds  Findings: No erythema  Comments: Diffuse psoriasis   Neurological:      General: No focal deficit present  Mental Status: She is alert and oriented to person, place, and time  Cranial Nerves: No cranial nerve deficit  Motor: No weakness  Psychiatric:         Mood and Affect: Mood normal          ED Medications  Medications   morphine injection 2 mg (has no administration in time range)   acetaminophen (FOR EMS ONLY) (TYLENOL) oral suspension 650 mg (0 mg Does not apply Given to EMS 3/9/22 1639)   sodium chloride 0 9 % bolus 1,000 mL (0 mL Intravenous Stopped 3/9/22 1836)   ondansetron (ZOFRAN) injection 4 mg (4 mg Intravenous Given 3/9/22 1736)   aspirin tablet 325 mg (325 mg Oral Given 3/9/22 1753)   sodium chloride 0 9 % bolus 1,000 mL (1,000 mL Intravenous New Bag 3/9/22 1936)   cefepime (MAXIPIME) 2 g/50 mL dextrose IVPB (2,000 mg Intravenous New Bag 3/9/22 1937)       Diagnostic Studies  Results Reviewed     Procedure Component Value Units Date/Time    HS Troponin I 2hr [[id number]]  (Abnormal) Collected: 03/09/22 1929    Lab Status: Final result Specimen: Blood from Arm, Right Updated: 03/09/22 2010     hs TnI 2hr 70 ng/L      Delta 2hr hsTnI 6 ng/L     Lactic acid 2 Hours [[id number]]  (Normal) Collected: 03/09/22 1929    Lab Status: Final result Specimen: Blood from Arm, Right Updated: 03/09/22 2005     LACTIC ACID 1 5 mmol/L     Narrative:      Result may be elevated if tourniquet was used during collection  UA w Reflex to Microscopic w Reflex to Culture [[id number]] Collected: 03/09/22 1935    Lab Status:  In process Specimen: Urine, Clean Catch Updated: 03/09/22 1943    HS Troponin I 4hr [[id number]]     Lab Status: No result Specimen: Blood     COVID/FLU/RSV - 2 hour TAT [[id number]]  (Normal) Collected: 03/09/22 1724    Lab Status: Final result Specimen: Nares from Nasopharyngeal Swab Updated: 03/09/22 1834     SARS-CoV-2 Negative     INFLUENZA A PCR Negative     INFLUENZA B PCR Negative     RSV PCR Negative    Narrative:      FOR PEDIATRIC PATIENTS - copy/paste COVID Guidelines URL to browser: [email or web address]  [employer]    SARS-CoV-2 assay is a Nucleic Acid Amplification assay intended for the  qualitative detection of nucleic acid from SARS-CoV-2 in nasopharyngeal  swabs  Results are for the presumptive identification of SARS-CoV-2 RNA  Positive results are indicative of infection with SARS-CoV-2, the virus  causing COVID-19, but do not rule out bacterial infection or co-infection  with other viruses  Laboratories within the [facility] and its  territories are required to report all positive results to the appropriate  public health authorities  Negative results do not preclude SARS-CoV-2  infection and should not be used as the sole basis for treatment or other  patient management decisions  Negative results must be combined with  clinical observations, patient history, and epidemiological information  This test has not been FDA cleared or approved  This test has been authorized by FDA under an Emergency Use Authorization  (EUA)  This test is only authorized for the duration of time the  declaration that circumstances exist justifying the authorization of the  emergency use of an in vitro diagnostic tests for detection of SARS-CoV-2  virus and/or diagnosis of COVID-19 infection under section 564(b)(1) of  the Act, 21 U  S C  867JZL-7(B)(3), unless the authorization is terminated  or revoked sooner   The test has been validated but independent review by FDA  and CLIA is pending  Test performed using [employer] GeneXpert: This RT-PCR assay targets N2,  a region unique to SARS-CoV-2  A conserved region in the E-gene was chosen  for pan-Sarbecovirus detection which includes SARS-CoV-2  Protime-INR [[id number]]  (Abnormal) Collected: 03/09/22 1654    Lab Status: Final result Specimen: Blood from Arm, Right Updated: 03/09/22 1740     Protime 17 7 seconds      INR 1 53    APTT [[id number]]  (Normal) Collected: 03/09/22 1654    Lab Status: Final result Specimen: Blood from Arm, Right Updated: 03/09/22 1740     PTT 33 seconds     Lactic acid [[id number]]  (Abnormal) Collected: 03/09/22 1654    Lab Status: Final result Specimen: Blood from Arm, Right Updated: 03/09/22 1740     LACTIC ACID 2 8 mmol/L     Narrative:      Result may be elevated if tourniquet was used during collection      Procalcitonin with AM Reflex [[id number]]  (Normal) Collected: 03/09/22 1654    Lab Status: Final result Specimen: Blood from Arm, Right Updated: 03/09/22 1737     Procalcitonin 0 18 ng/ml     Procalcitonin Reflex [[id number]]     Lab Status: No result Specimen: Blood     HS Troponin 0hr (reflex protocol) [[id number]]  (Abnormal) Collected: 03/09/22 1654    Lab Status: Final result Specimen: Blood from Arm, Right Updated: 03/09/22 1735     hs TnI 0hr 64 ng/L     NT-BNP PRO [[id number]]  (Abnormal) Collected: 03/09/22 1654    Lab Status: Final result Specimen: Blood from Arm, Right Updated: 03/09/22 1728     NT-proBNP 1,351 pg/mL     Comprehensive metabolic panel [[id number]]  (Abnormal) Collected: 03/09/22 1654    Lab Status: Final result Specimen: Blood from Arm, Right Updated: 03/09/22 1727     Sodium 142 mmol/L      Potassium 4 0 mmol/L      Chloride 110 mmol/L      CO2 22 mmol/L      ANION GAP 10 mmol/L      BUN 9 mg/dL      Creatinine 1 54 mg/dL      Glucose 109 mg/dL      Calcium 9 4 mg/dL      Corrected Calcium 10 5 mg/dL      AST 22 U/L      ALT 15 U/L      Alkaline Phosphatase 52 U/L      Total Protein 7 0 g/dL      Albumin 2 6 g/dL      Total Bilirubin 0 89 mg/dL      eGFR 33 ml/min/1 73sq m     Narrative:      [place] guidelines for Chronic Kidney Disease (CKD):     Stage 1 with normal or high GFR (GFR > 90 mL/min/1 73 square meters)    Stage 2 Mild CKD (GFR = 60-89 mL/min/1 73 square meters)    Stage 3A Moderate CKD (GFR = 45-59 mL/min/1 73 square meters)    Stage 3B Moderate CKD (GFR = 30-44 mL/min/1 73 square meters)    Stage 4 Severe CKD (GFR = 15-29 mL/min/1 73 square meters)    Stage 5 End Stage CKD (GFR <15 mL/min/1 73 square meters)  Note: GFR calculation is accurate only with a steady state creatinine    Blood culture #1 [[id number]] Collected: 03/09/22 1716    Lab Status: In process Specimen: Blood from Arm, Left Updated: 03/09/22 1720    CBC and differential [[id number]] Collected: 03/09/22 1654    Lab Status: Final result Specimen: Blood from Arm, Right Updated: 03/09/22 1707     WBC 7 93 Thousand/uL      RBC 4 83 Million/uL      Hemoglobin 13 0 g/dL      Hematocrit 39 8 %      MCV 82 fL      MCH 26 9 pg      MCHC 32 7 g/dL      RDW 15 1 %      MPV 12 0 fL      Platelets 212 Thousands/uL      nRBC 0 /100 WBCs      Neutrophils Relative 74 %      Immat GRANS % 1 %      Lymphocytes Relative 14 %      Monocytes Relative 8 %      Eosinophils Relative 3 %      Basophils Relative 0 %      Neutrophils Absolute 5 91 Thousands/µL      Immature Grans Absolute 0 04 Thousand/uL      Lymphocytes Absolute 1 10 Thousands/µL      Monocytes Absolute 0 63 Thousand/µL      Eosinophils Absolute 0 23 Thousand/µL      Basophils Absolute 0 02 Thousands/µL     Blood culture #2 [[id number]] Collected: 03/09/22 1654    Lab Status:  In process Specimen: Blood from Arm, Right Updated: 03/09/22 1701                 XR chest portable   ED Interpretation by Shonda Kovacs DO (03/09 1732)   No acute cardiopulmonary process              Procedures  Procedures      ED Course  ED Course as of 03/09/22 2048   Wed Mar 09, 2022   1732 hs TnI 0hr(!): 64  Given  PO  EKG shows ST at 108 bpm, normal axis, no acute ST-T changes   1820 LACTIC ACID(!!): 2 8  Orders for 2L IVF  2021 hs TnI 2hr(!): 70   2024 LACTIC ACID: 1 5     Will do sepsis workup, as patient is a poor historian and has hx of febrile illness with cough  Patient is SIRS positive with hx fever and tachycardia  At this time, suspect pulmonary or urinary source  Will start IVF with 1L and Zofran 4 mg IV  Daughter at bedside and able to provide hx as documented in the HPI  CXR reviewed and does not appear to be a focal consolidation, or CHF  Will continue IVF with additional 1L  Cefepime 2g ordered, suspect urinary source  Patient is not hypotensive and is not in septic shock therefore 30cc/kg fluid bolus not necessary  On re-evaluation, patient is resting comfortably  Not hypoxic with O2 sat 95-97% on RA  UA pending  She has no recurrence of vomiting in the ED  Case discussed with Dr Diamante Vital; arrangements made for admission  Patient given Morphine 2 mg IV due to severe arthritic pain  Cannot given Toradol IV because of GFR  SBIRT 20yo+      Most Recent Value   SBIRT (22 yo +)    In order to provide better care to our patients, we are screening all of our patients for alcohol and drug use  Would it be okay to ask you these screening questions?  No Filed at: 03/09/2022 1650          MDM    Disposition  Final diagnoses:   Suspected UTI   Elevated troponin I measurement   Nausea and vomiting   Cough     Time reflects when diagnosis was documented in both MDM as applicable and the Disposition within this note     Time User Action Codes Description Comment    3/9/2022  8:31 PM Alvester Rang Add [R39 89] Suspected UTI     3/9/2022  8:32 PM Luke Anatoliy [R77 8] Elevated troponin I measurement     3/9/2022  8:32 PM Alvester Rang Add [R11 2] Nausea and vomiting     3/9/2022  8:32 PM Alvester Rang Add [R05 9] Cough       ED Disposition     ED Disposition Condition Date/Time Comment    Admit Stable Wed Mar 9, 2022  8:32 PM Case was discussed with Dr Osvaldo Nesbitt and the patient's admission status was agreed to be Admission Status: inpatient status to the service of Dr Osvaldo Nesbitt  Follow-up Information    None         Patient's Medications   Discharge Prescriptions    No medications on file     No discharge procedures on file  PDMP Review       Value Time User    PDMP Reviewed  Yes 12/3/2021  7:10 AM Veronica Ivy DO           ED Provider  Attending physically available and evaluated [facility]  I managed the patient along with the ED Attending      Electronically Signed by         Hailey Levine DO  03/09/22 2048

## 2022-03-10 LAB
ANION GAP SERPL CALCULATED.3IONS-SCNC: 6 MMOL/L (ref 4–13)
BUN SERPL-MCNC: 10 MG/DL (ref 5–25)
CALCIUM SERPL-MCNC: 8.5 MG/DL (ref 8.3–10.1)
CHLORIDE SERPL-SCNC: 113 MMOL/L (ref 100–108)
CO2 SERPL-SCNC: 24 MMOL/L (ref 21–32)
CREAT SERPL-MCNC: 1.2 MG/DL (ref 0.6–1.3)
ERYTHROCYTE [DISTWIDTH] IN BLOOD BY AUTOMATED COUNT: 15.2 % (ref 11.6–15.1)
GFR SERPL CREATININE-BSD FRML MDRD: 44 ML/MIN/1.73SQ M
GLUCOSE SERPL-MCNC: 83 MG/DL (ref 65–140)
HCT VFR BLD AUTO: 36.2 % (ref 34.8–46.1)
HGB BLD-MCNC: 11.8 G/DL (ref 11.5–15.4)
MCH RBC QN AUTO: 26.9 PG (ref 26.8–34.3)
MCHC RBC AUTO-ENTMCNC: 32.6 G/DL (ref 31.4–37.4)
MCV RBC AUTO: 83 FL (ref 82–98)
PLATELET # BLD AUTO: 169 THOUSANDS/UL (ref 149–390)
PMV BLD AUTO: 12.1 FL (ref 8.9–12.7)
POTASSIUM SERPL-SCNC: 4.1 MMOL/L (ref 3.5–5.3)
PROCALCITONIN SERPL-MCNC: 0.3 NG/ML
RBC # BLD AUTO: 4.38 MILLION/UL (ref 3.81–5.12)
SODIUM SERPL-SCNC: 143 MMOL/L (ref 136–145)
WBC # BLD AUTO: 5.93 THOUSAND/UL (ref 4.31–10.16)

## 2022-03-10 PROCEDURE — 99232 SBSQ HOSP IP/OBS MODERATE 35: CPT

## 2022-03-10 PROCEDURE — 97163 PT EVAL HIGH COMPLEX 45 MIN: CPT

## 2022-03-10 PROCEDURE — 36415 COLL VENOUS BLD VENIPUNCTURE: CPT | Performed by: INTERNAL MEDICINE

## 2022-03-10 PROCEDURE — 80048 BASIC METABOLIC PNL TOTAL CA: CPT | Performed by: INTERNAL MEDICINE

## 2022-03-10 PROCEDURE — 84145 PROCALCITONIN (PCT): CPT | Performed by: INTERNAL MEDICINE

## 2022-03-10 PROCEDURE — 85027 COMPLETE CBC AUTOMATED: CPT | Performed by: INTERNAL MEDICINE

## 2022-03-10 PROCEDURE — NC001 PR NO CHARGE: Performed by: DERMATOLOGY

## 2022-03-10 RX ADMIN — SODIUM CHLORIDE, SODIUM GLUCONATE, SODIUM ACETATE, POTASSIUM CHLORIDE, MAGNESIUM CHLORIDE, SODIUM PHOSPHATE, DIBASIC, AND POTASSIUM PHOSPHATE 100 ML/HR: .53; .5; .37; .037; .03; .012; .00082 INJECTION, SOLUTION INTRAVENOUS at 03:10

## 2022-03-10 RX ADMIN — ACETAMINOPHEN 650 MG: 325 TABLET ORAL at 21:42

## 2022-03-10 RX ADMIN — PREDNISONE 5 MG: 5 TABLET ORAL at 17:18

## 2022-03-10 RX ADMIN — APIXABAN 5 MG: 5 TABLET, FILM COATED ORAL at 17:18

## 2022-03-10 RX ADMIN — PREDNISONE 5 MG: 5 TABLET ORAL at 08:18

## 2022-03-10 RX ADMIN — HYDROXYCHLOROQUINE SULFATE 200 MG: 200 TABLET, FILM COATED ORAL at 17:18

## 2022-03-10 RX ADMIN — APIXABAN 5 MG: 5 TABLET, FILM COATED ORAL at 08:18

## 2022-03-10 RX ADMIN — METOPROLOL TARTRATE 25 MG: 25 TABLET, FILM COATED ORAL at 08:18

## 2022-03-10 RX ADMIN — LIDOCAINE 5% 1 PATCH: 700 PATCH TOPICAL at 03:09

## 2022-03-10 RX ADMIN — Medication 1000 UNITS: at 08:18

## 2022-03-10 RX ADMIN — HYDROXYCHLOROQUINE SULFATE 200 MG: 200 TABLET, FILM COATED ORAL at 10:29

## 2022-03-10 RX ADMIN — PANTOPRAZOLE SODIUM 40 MG: 40 TABLET, DELAYED RELEASE ORAL at 06:16

## 2022-03-10 RX ADMIN — LIDOCAINE 5% 1 PATCH: 700 PATCH TOPICAL at 08:19

## 2022-03-10 NOTE — ASSESSMENT & PLAN NOTE
· Continue metoprolol tartrate 25 mg b i d   With strict hold parameters, hold HCTZ in setting of acute renal failure  · Monitor blood pressure per protocol

## 2022-03-10 NOTE — H&P
[place] [facility] 1950, 66 y o  female MRN: [id number]  Unit/Bed#: ED 21 Encounter: [id number]  Primary Care Provider: Nanette Spain DO   Date and time admitted to hospital: 3/9/2022  4:30 PM    * Acute renal failure (ARF) ([facility] )  Assessment & Plan  · POA, baseline approximately 0 8  · Possibly in setting of limited oral intake and reported vomiting  · Trial IVF overnight  · Measure PVR and bladder scan, follow-up UA  · Hold nephrotoxins and avoid hypotension  · Repeat BMP in a m  Cough  Assessment & Plan  · Cough with reported posttussive emesis for past 2 days, additionally reported fever at home to 101 5° F  · Patient has been afebrile here and without leukocytosis, CXR to wet read without evidence of acute cardiopulmonary disease (will follow-up final read), procalcitonin WNL  COVID-19/influenza/RSV PCR negative  · Received cefepime in ED, will follow up results of serial procalcitonin and blood cultures x2 to defer need for further antibiotics  · Supportive care otherwise in the interim    Elevated troponin  Assessment & Plan  · Troponin 64->70, patient without complaints of chest pain and EKG without acute ischemic change  · Suspect demand ischemia secondary to acute renal failure and possible infectious etiology  Monitor for development of cardiac symptoms, follow-up final troponin    Paroxysmal atrial fibrillation (HCC)  Assessment & Plan  · Recent diagnosis at prior hospitalization  · Currently in sinus rhythm, continue metoprolol tartrate  · Continue anticoagulation with Eliquis    Morbid obesity with BMI of 40 0-44 9, adult (HCC)  Assessment & Plan  · Dietary and lifestyle modification advised    Benign essential hypertension  Assessment & Plan  · Continue metoprolol tartrate 25 mg b i d   With strict hold parameters, hold HCTZ in setting of acute renal failure  · Monitor blood pressure per protocol    Rheumatoid arthritis ([facility] )  Assessment & Plan  · Follows with Rheumatology (Dr Donavan Jones)  · Continue prednisone 5 mg b i d , Plaquenil  Patient stating she is no longer taking Humira      VTE Prophylaxis: Apixaban (Eliquis)  / sequential compression device   Code Status: Level 1 - Full Code  POLST: POLST form is not discussed and not completed at this time  Discussion with family:  Daughter at bedside    Anticipated Length of Stay:  Patient will be admitted on an Inpatient basis with an anticipated length of stay of  greater than 2 midnights  Justification for Hospital Stay: Please see detailed plans noted above  Chief Complaint:     Cough, vomiting, fever  History of Present Illness:  Tita Rivera is a 66 y o  female who presented with reports of fever and vomiting at home  She has a past medical history significant for recently diagnosed atrial fibrillation currently anticoagulated on Eliquis, rheumatoid arthritis, reported sarcoidosis, reported bronchitis, hypertension  Notably she had prolonged hospitalization here 01/10/2022-01/26/2022 with sepsis believed secondary to bronchitis and completed antibiotic course, unfortunately developed COVID-19 infection while hospitalized and received remdesivir given immunocompromised status  Initially was doing well since discharge, however for the past today she was noted by daughter and VNA with worsening fatigue, diminished appetite, along with cough productive of whitish-yellow phlegm with intermittent vomiting which appeared the color of phlegm  Today, she apparently was noted by VNA with fever with temperature 101 5° F for which EMS was contacted  Patient denies any known sick contacts, recent travel, chest pain/pressure, shortness of breath, abdominal pain/diarrhea, dysuria, headache, dizziness/lightheadedness, or palpitations    During ED evaluation here she was afebrile (did receive Tylenol pre-hospital), however given description symptoms septic workup was initiated which did not reveal clear source however patient received cefepime; additionally acute renal failure was noted for which IV fluids were provided and patient admitted for further management  Currently, patient is lying in bed in no acute distress and comfortable appearing  Feels her symptoms are controlled at this time, denies any current fevers/chills      Review of Systems:    Constitutional:  Endorsed fever and chills, diminished appetite, fatigue  Eyes:  Denies change in visual acuity   HENT:  Denies nasal congestion or sore throat   Respiratory:  Denies shortness of breath but endorses cough  Cardiovascular:  Denies chest pain or edema   GI:  Denies abdominal pain, bloody stools or diarrhea but endorsed nausea vomiting  :  Denies dysuria   Musculoskeletal:  Denies back pain or joint pain   Integument:  Denies rash   Neurologic:  Denies headache, focal weakness or sensory changes   Endocrine:  Denies polyuria or polydipsia   Lymphatic:  Denies swollen glands   Psychiatric:  Denies depression or anxiety     Past Medical and Surgical History:   Past Medical History:   Diagnosis Date    Abnormal thyroid function test     last assessed: 2015     Arthritis     Caries     last assessed: 2016     Edema of right lower extremity     last assessed: 2015     GERD (gastroesophageal reflux disease)     Hypertension     Sarcoid      Past Surgical History:   Procedure Laterality Date     SECTION      MULTIPLE TOOTH EXTRACTIONS N/A 2016    Procedure: Surgical extraction of teeth 2, 18, 19, 30, 31; incision and drainage of left subperiosteal abscess ;  Surgeon: Carol Ann Martinez DMD;  Location: [facility];  Service:        Meds/Allergies:    Current Facility-Administered Medications:     acetaminophen (TYLENOL) tablet 650 mg, 650 mg, Oral, Q4H PRN, Kedar Brannon DO    apixaban (ELIQUIS) tablet 5 mg, 5 mg, Oral, BID, Kedar Brannon DO    benzonatate (TESSALON PERLES) capsule 100 mg, 100 mg, Oral, TID PRN, Kedar Lua DO    [START ON 3/10/2022] cholecalciferol (VITAMIN D3) tablet 1,000 Units, 1,000 Units, Oral, Daily, Kedar GreenwoodrDO    guaiFENesin (MUCINEX) 12 hr tablet 600 mg, 600 mg, Oral, Q12H PRN, Kedar Greenwoodr, DO    hydroxychloroquine (PLAQUENIL) tablet 200 mg, 200 mg, Oral, BID, Kedar Greenwoodr, DO    lidocaine (LIDODERM) 5 % patch 1 patch, 1 patch, Topical, Daily, Kedar Greenwoodr, DO    metoprolol tartrate (LOPRESSOR) tablet 25 mg, 25 mg, Oral, Q12H [address], Kedar Greenwoodr, DO    multi-electrolyte (PLASMALYTE-A/ISOLYTE-S PH 7 4) IV solution, 100 mL/hr, Intravenous, Continuous, Kedar Stoney,     ondansetron [facility]) injection 4 mg, 4 mg, Intravenous, Q6H PRN, Kedar Lua DO    [START ON 3/10/2022] pantoprazole (PROTONIX) EC tablet 40 mg, 40 mg, Oral, Early Morning, Kedar Lua, DO    [START ON 3/10/2022] predniSONE tablet 5 mg, 5 mg, Oral, BID With Meals, Kedar Lua DO    Current Outpatient Medications:     acetaminophen (TYLENOL) 325 mg tablet, Take 2 tablets (650 mg total) by mouth every 4 (four) hours as needed for mild pain, headaches or fever  , Disp: 30 tablet, Rfl: 0    Adalimumab (HUMIRA PEN) 40 MG/0 8ML PNKT, Inject 40 mg under the skin every 14 (fourteen) days, Disp: , Rfl:     alendronate (FOSAMAX) 70 mg tablet, Take by mouth every 7 days , Disp: , Rfl:     apixaban (ELIQUIS) 5 mg, Take 1 tablet (5 mg total) by mouth 2 (two) times a day, Disp: , Rfl: 0    cholecalciferol (VITAMIN D3) 1,000 units tablet, Take 1,000 Units by mouth daily, Disp: , Rfl:     Clobetasol Propionate E 0 05 % emollient cream, APPLY TWICE A DAY FOR PALM SKIN DERMITITS, Disp: , Rfl:     guaiFENesin (MUCINEX) 600 mg 12 hr tablet, Take 1 tablet (600 mg total) by mouth every 12 (twelve) hours as needed for cough or congestion, Disp: , Rfl: 0    hydrochlorothiazide (HYDRODIURIL) 12 5 mg tablet, Take 1 tablet (12 5 mg total) by mouth daily, Disp: 30 tablet, Rfl: 0   hydroxychloroquine (PLAQUENIL) 200 mg tablet, Take 200 mg by mouth 2 (two) times a day , Disp: , Rfl:     ketoconazole (NIZORAL) 2 % cream, Apply topically 2 (two) times a day, Disp: 60 g, Rfl: 0    lidocaine (LIDODERM) 5 %, Apply 1 patch topically daily Remove & Discard patch within 12 hours or as directed by MD, Disp: , Rfl: 0    metoprolol tartrate (LOPRESSOR) 25 mg tablet, Take 1 tablet (25 mg total) by mouth every 12 (twelve) hours, Disp: , Rfl: 0    Miconazole Nitrate (Manjeet Antifungal) 2 % cream, Apply topically 2 (two) times a day To sacrum, Disp: , Rfl: 0    omeprazole (PriLOSEC) 20 mg delayed release capsule, TAKE 1 CAPSULE DAILY, Disp: 90 capsule, Rfl: 3    potassium chloride (K-DUR,KLOR-CON) 10 mEq tablet, Take 1 tablet (10 mEq total) by mouth daily, Disp: , Rfl: 0    predniSONE 5 mg tablet, Take 5 mg by mouth daily  1 mg prednisone 2 x/day, Disp: , Rfl:     triamcinolone (KENALOG) 0 1 % cream, Apply 1 application topically 2 (two) times a day To affected area, Disp: , Rfl: 0    Allergies:    Allergies   Allergen Reactions    Methotrexate Derivatives     Shellfish-Derived Products - Food Allergy Itching    Amoxicillin Rash    Ampicillin-Sulbactam Sodium Rash     History:  Marital Status: Single     Substance Use History:   Social History     Substance and Sexual Activity   Alcohol Use No     Social History     Tobacco Use   Smoking Status Never Smoker   Smokeless Tobacco Never Used     Social History     Substance and Sexual Activity   Drug Use No       Family History:  Family History   Problem Relation Age of Onset    Asthma Mother     Stroke Mother     No Known Problems Father     No Known Problems Sister     No Known Problems Brother     No Known Problems Maternal Grandmother     No Known Problems Maternal Grandfather     No Known Problems Paternal Grandmother     No Known Problems Paternal Grandfather     Diabetes Maternal Aunt     Breast cancer Cousin     Diabetes Cousin     Breast cancer Other        Physical Exam:     Vitals:   Blood Pressure: 137/63 (03/09/22 2142)  Pulse: 97 (03/09/22 2142)  Temperature: 99 4 °F (37 4 °C) (03/09/22 1634)  Temp Source: Oral (03/09/22 1634)  Respirations: 18 (03/09/22 2142)  Height: 5' 4" (162 6 cm) (03/09/22 1700)  Weight - Scale: 113 kg (250 lb) (03/09/22 1700)  SpO2: 96 % (03/09/22 2142)    Constitutional:  Well developed, morbidly obese, no acute distress, non-toxic appearance   Eyes:  PERRL, conjunctiva normal   HENT:  Atraumatic, external ears normal, nose normal, oropharynx moist, no pharyngeal exudates  Neck- normal range of motion, no tenderness, supple   Respiratory:  No respiratory distress, normal breath sounds, no rales, no wheezing   Cardiovascular:  Normal rate, normal rhythm, no murmurs, no gallops, no rubs   GI:  Soft, nondistended, normal bowel sounds, nontender, no organomegaly, no mass, no rebound, no guarding   :  No costovertebral angle tenderness   Musculoskeletal:  No edema, no tenderness, no deformities  Back- no tenderness  Integument:  Well hydrated, diffuse psoriatic rash   Lymphatic:  No lymphadenopathy noted   Neurologic:  Alert &awake, communicative, CN 2-12 normal, normal motor function, normal sensory function, no focal deficits noted   Psychiatric:  Speech and behavior appropriate       Lab Results: I have personally reviewed pertinent reports        Results from last 7 days   Lab Units 03/09/22  1654   WBC Thousand/uL 7 93   HEMOGLOBIN g/dL 13 0   HEMATOCRIT % 39 8   PLATELETS Thousands/uL 193   NEUTROS PCT % 74   LYMPHS PCT % 14   MONOS PCT % 8   EOS PCT % 3     Results from last 7 days   Lab Units 03/09/22  1654   POTASSIUM mmol/L 4 0   CHLORIDE mmol/L 110*   CO2 mmol/L 22   BUN mg/dL 9   CREATININE mg/dL 1 54*   CALCIUM mg/dL 9 4   ALK PHOS U/L 52   ALT U/L 15   AST U/L 22     Results from last 7 days   Lab Units 03/09/22  1654   INR  1 53*       EKG:  Sinus tachycardia     Imaging: I have personally reviewed pertinent films in PACS    CXR:  Personally reviewed, no acute cardiopulmonary disease visualized  Final radiologist read is pending  ** Please Note: Dragon 360 Dictation voice to text software was used in the creation of this document   **

## 2022-03-10 NOTE — UTILIZATION REVIEW
Initial Clinical Review    Admission: Date/Time/Statement:   Admission Orders (From admission, onward)     Ordered        03/09/22 2033  Inpatient Admission  Once                      Orders Placed This Encounter   Procedures    Inpatient Admission     Standing Status:   Standing     Number of Occurrences:   1     Order Specific Question:   Level of Care     Answer:   Med Surg [16]     Order Specific Question:   Estimated length of stay     Answer:   More than 2 Midnights     Order Specific Question:   Certification     Answer:   I certify that inpatient services are medically necessary for this patient for a duration of greater than two midnights  See H&P and MD Progress Notes for additional information about the patient's course of treatment  ED Arrival Information     Expected Arrival Acuity    - 3/9/2022 16:30 Urgent         Means of arrival Escorted by Service Admission type    Ambulance [facility] Hospitalist Urgent         Arrival complaint    fever        Chief Complaint   Patient presents with   [facility] Medical Problem     Per EMS pt reports fever and chills  En route temp was 101 5  Possible hx of afib  got 650mg NACL of and 650mg Tylenol  Reports possible fall yesterday  Initial Presentation: 65 y/o female with PMH of recently diagnosed atrial fibrillation currently anticoagulated on Eliquis, rheumatoid arthritis, reported sarcoidosis, reported bronchitis, hypertension presents to [place] ED via EMS with c/o worsening fatigue, decreased appetite, productive cough w/ white-yellowish sputum and intermittent vomiting  Recent hospitalization for Sepsis secondary to Bronchitis, completed ABX course, developed COVID-19 during admission and received remdesivir  Today, VNA noted temp 101 5, Tylenol given prehospital   In ED, septic workup initiated with unclear source, received IV ABX, also AMANDA noted and IVF given, Trop elevated 64->70      Admit Inpatient med surg telemetry d/t ARF, Cough, Elevated Trop:  Continue IVF, repeat labs in am, measure PVR and bladder scan, f/up on UA, blood cxs and procal, pt does not c/o chest pain and EKG w/o ischemic changes continue to monitor cardiac symptoms and f/u on trop, continue home eliquis and other meds  Date: 3/10   Day 2:   Pt w/ continued generalized weakness and fatigue  Mucous membranes are dry, decreased breath souncs, BL LE tenderness  Continue to monitor renal function, measure PVR and bladder scan, monitor IO and volume status  PT/OT eval pending  Continues to deny chest pain or cardiac concerns, monitor BP  Continue home meds  ED Triage Vitals   Temperature Pulse Respirations Blood Pressure SpO2   03/09/22 1634 03/09/22 1634 03/09/22 1634 03/09/22 1640 03/09/22 1637   99 4 °F (37 4 °C) 71 20 141/60 100 %      Temp Source Heart Rate Source Patient Position - Orthostatic VS BP Location FiO2 (%)   03/09/22 1634 03/09/22 1634 03/09/22 1634 03/09/22 1634 --   Oral Monitor Lying Right arm       Pain Score       03/09/22 1700       No Pain          Wt Readings from Last 1 Encounters:   03/09/22 113 kg (250 lb)     Additional Vital Signs:   Date/Time Temp Pulse Resp BP MAP (mmHg) SpO2 O2 Device Patient Position - Orthostatic VS   03/10/22 07:24:41 98 3 °F (36 8 °C) 98 17 111/56 74 97 % -- --   03/10/22 0630 -- 92 15 123/57 82 98 % None (Room air) --   03/10/22 0311 -- 98 18 129/59 -- 97 % None (Room air) --   03/09/22 2142 -- 97 18 137/63 -- 96 % None (Room air) --   03/09/22 1930 -- 98 18 140/68 -- 95 % None (Room air) Lying     Pertinent Labs/Diagnostic Test Results:   3/9 EKG:  Sinus tachycardia  Nonspecific ST abnormality  Abnormal ECG    XR chest portable   Final Result by Shoaib Lira MD (03/10 0866)      No acute cardiopulmonary disease       Results from last 7 days   Lab Units 03/09/22  1724   SARS-COV-2  Negative     Results from last 7 days   Lab Units 03/10/22  0615 03/09/22  1654   WBC Thousand/uL 5 93 7 93   HEMOGLOBIN g/dL 11 8 13 0   HEMATOCRIT % 36 2 39 8   PLATELETS Thousands/uL 169 193   NEUTROS ABS Thousands/µL  --  5 91         Results from last 7 days   Lab Units 03/10/22  0615 03/09/22  1654   SODIUM mmol/L 143 142   POTASSIUM mmol/L 4 1 4 0   CHLORIDE mmol/L 113* 110*   CO2 mmol/L 24 22   ANION GAP mmol/L 6 10   BUN mg/dL 10 9   CREATININE mg/dL 1 20 1 54*   EGFR ml/min/1 73sq m 44 33   CALCIUM mg/dL 8 5 9 4     Results from last 7 days   Lab Units 03/09/22  1654   AST U/L 22   ALT U/L 15   ALK PHOS U/L 52   TOTAL PROTEIN g/dL 7 0   ALBUMIN g/dL 2 6*   TOTAL BILIRUBIN mg/dL 0 89     Results from last 7 days   Lab Units 03/10/22  0615 03/09/22  1654   GLUCOSE RANDOM mg/dL 83 109     Results from last 7 days   Lab Units 03/09/22 2134 03/09/22 1929 03/09/22  1654   HS TNI 0HR ng/L  --   --  64*   HS TNI 2HR ng/L  --  70*  --    HSTNI D2 ng/L  --  6  --    HS TNI 4HR ng/L 68*  --   --    HSTNI D4 ng/L 4  --   --      Results from last 7 days   Lab Units 03/09/22  1654   PROTIME seconds 17 7*   INR  1 53*   PTT seconds 33     Results from last 7 days   Lab Units 03/10/22  0615 03/09/22  1654   PROCALCITONIN ng/ml 0 30* 0 18     Results from last 7 days   Lab Units 03/09/22  1929 03/09/22  1654   LACTIC ACID mmol/L 1 5 2 8*     Results from last 7 days   Lab Units 03/09/22  1654   NT-PRO BNP pg/mL 1,351*     Results from last 7 days   Lab Units 03/09/22  1935   CLARITY UA  Clear   COLOR UA  Yellow   SPEC GRAV UA  1 020   PH UA  5 0   GLUCOSE UA mg/dl Negative   KETONES UA mg/dl Negative   BLOOD UA  Negative   PROTEIN UA mg/dl 50 (1+)*   NITRITE UA  Negative   BILIRUBIN UA  Negative   UROBILINOGEN UA (BE) mg/dl <2 0   LEUKOCYTES UA  Trace*   WBC UA /hpf 2-4*   RBC UA /hpf 1-2   BACTERIA UA /hpf None Seen   EPITHELIAL CELLS WET PREP /hpf Occasional     Results from last 7 days   Lab Units 03/09/22  9237   INFLUENZA A PCR  Negative   INFLUENZA B PCR  Negative   RSV PCR  Negative     ED Treatment:   Medication Administration from 03/09/2022 1629 to 03/10/2022 8481       Date/Time Order Dose Route Action     03/09/2022 1639 acetaminophen (FOR EMS ONLY) (TYLENOL) oral suspension 650 mg 0 mg Does not apply Given to EMS     03/09/2022 1736 sodium chloride 0 9 % bolus 1,000 mL 1,000 mL Intravenous New Bag     03/09/2022 1736 ondansetron (ZOFRAN) injection 4 mg 4 mg Intravenous Given     03/09/2022 1753 aspirin tablet 325 mg 325 mg Oral Given     03/09/2022 1936 sodium chloride 0 9 % bolus 1,000 mL 1,000 mL Intravenous New Bag     03/09/2022 1937 cefepime (MAXIPIME) 2 g/50 mL dextrose IVPB 2,000 mg Intravenous New Bag     03/09/2022 2138 morphine injection 2 mg 2 mg Intravenous Given     03/10/2022 0309 lidocaine (LIDODERM) 5 % patch 1 patch 1 patch Topical Medication Applied     03/10/2022 0310 metoprolol tartrate (LOPRESSOR) tablet 25 mg 25 mg Oral Not Given     03/10/2022 0616 pantoprazole (PROTONIX) EC tablet 40 mg 40 mg Oral Given     03/10/2022 0310 multi-electrolyte (PLASMALYTE-A/ISOLYTE-S PH 7 4) IV solution 100 mL/hr Intravenous New Bag        Past Medical History:   Diagnosis Date    Abnormal thyroid function test     last assessed: Sept 25, 2015     Arthritis     Caries     last assessed: Sept 9, 2016     Edema of right lower extremity     last assessed: March 18, 2015     GERD (gastroesophageal reflux disease)     Hypertension     Sarcoid      Present on Admission:   Benign essential hypertension   Rheumatoid arthritis (HCC)   Paroxysmal atrial fibrillation (HCC)   Acute renal failure (ARF) (HCC)   Cough   Elevated troponin      Admitting Diagnosis: Cough [R05 9]  Nausea and vomiting [R11 2]  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]  Elevated troponin I measurement [R77 8]  Suspected UTI [R39 89]  Age/Sex: 66 y o  female  Admission Orders:  Scheduled Medications:  apixaban, 5 mg, Oral, BID  cholecalciferol, 1,000 Units, Oral, Daily  hydroxychloroquine, 200 mg, Oral, BID  lidocaine, 1 patch, Topical, Daily  metoprolol tartrate, 25 mg, Oral, Q12H FAMILIA  pantoprazole, 40 mg, Oral, Early Morning  predniSONE, 5 mg, Oral, BID With Meals      Continuous IV Infusions:  multi-electrolyte, 100 mL/hr, Intravenous, Continuous      PRN Meds:  acetaminophen, 650 mg, Oral, Q4H PRN  benzonatate, 100 mg, Oral, TID PRN  guaiFENesin, 600 mg, Oral, Q12H PRN  ondansetron, 4 mg, Intravenous, Q6H PRN    IO, daily wts  Inc spir  SCD    Network Utilization Review Department  ATTENTION: Please call with any questions or concerns to [phone] and carefully listen to the prompts so that you are directed to the right person  All voicemails are confidential   [facility] all requests for admission clinical reviews, approved or denied determinations and any other requests to dedicated fax number below belonging to the campus where the patient is receiving treatment   List of dedicated fax numbers for the Facilities:  [address] DENIALS (Administrative/Medical Necessity) [phone]   [address] (Maternity/NICU/Pediatrics) [phone]   [address]  [address] [place] [address] [id number] [place] [address] [address] [facility] [facility] [phone]

## 2022-03-10 NOTE — ASSESSMENT & PLAN NOTE
· POA, baseline approximately 0 8  · Possibly in setting of limited oral intake and reported vomiting  · Trial IVF overnight  · Measure PVR and bladder scan, follow-up UA  · Hold nephrotoxins and avoid hypotension  · Repeat BMP in a m

## 2022-03-10 NOTE — ASSESSMENT & PLAN NOTE
· Recent diagnosis at prior hospitalization  · Currently in sinus rhythm, continue metoprolol tartrate  · Continue anticoagulation with Eliquis

## 2022-03-10 NOTE — ASSESSMENT & PLAN NOTE
· Troponin 64->70, patient without complaints of chest pain and EKG without acute ischemic change  · Suspect demand ischemia secondary to acute renal failure and possible infectious etiology    Monitor for development of cardiac symptoms, follow-up final troponin

## 2022-03-10 NOTE — ASSESSMENT & PLAN NOTE
· Cough with reported posttussive emesis for past 2 days, additionally reported fever at home to 101 5° F  · Patient has been afebrile here and without leukocytosis, CXR to wet read without evidence of acute cardiopulmonary disease (will follow-up final read), procalcitonin WNL    COVID-19/influenza/RSV PCR negative  · Received cefepime in ED, will follow up results of serial procalcitonin and blood cultures x2 to defer need for further antibiotics  · Supportive care otherwise in the interim

## 2022-03-10 NOTE — PROGRESS NOTES
[address] [facility]  Progress Note - Carter Smoke 1950, 66 y o  female MRN: [id number]  Unit/Bed#: CW2 213-02 Encounter: [id number]  Primary Care Provider: Char Dickson DO   Date and time admitted to hospital: 3/9/2022  4:30 PM    * Acute renal failure (ARF) ([facility] [address] )  Assessment & Plan  · POA  · Baseline approximately 0 8  · Possibly in setting of limited oral intake and reported vomiting  · U/A: Benign  · C/w IVF   · Monitor volume status and I/O's  · Measure PVR and bladder scan  · Limit/Avoid nephrotoxins  · Avoid hypotension and fluctuations in BP  · Continue to Monitor Renal function    Cough  Assessment & Plan  · Cough with reported posttussive emesis for past 2 days, additionally reported fever at home to 101 5° F  Patient with overall generalized fatigue/weakness  · Patient has been afebrile here and without leukocytosis  · CXR to wet read without evidence of acute cardiopulmonary disease (will follow-up final read)  · Procalcitonin: 0 18-->0  3  · COVID-19/influenza/RSV PCR: Negative  · BC x2: Pending  · S/P cefepime in ED, Will hold off Abx at this time  · Follow up culture results  · Supportive care otherwise in the interim  · PT/OT evlautaiton: Pending    Elevated troponin  Assessment & Plan  · Troponin 64->70-->68  · Patient without complaints of chest pain  · EKG without acute ischemic change  · Suspect demand ischemia secondary to acute renal failure and possible infectious etiology  · Monitor for development of cardiac symptoms    Paroxysmal atrial fibrillation (HCC)  Assessment & Plan  · Recent diagnosis at prior hospitalization  · Currently in sinus rhythm  · Continue Home medications:  · Metoprolol tartrate and A/C with Eliquis    Morbid obesity with BMI of 40 0-44 9, adult (HCC)  Assessment & Plan  · Encourage Lifestyle modifications    Benign essential hypertension  Assessment & Plan  · Continue Home medications:  · Metoprolol tartrate 25 mg b i d   With strict hold parameters  · Hold HCTZ in setting of acute renal failure  · Monitor blood pressure per protocol        VTE Pharmacologic Prophylaxis: VTE Score: 6 High Risk (Score >/= 5) - Pharmacological DVT Prophylaxis Ordered: apixaban (Eliquis)  Sequential Compression Devices Ordered  Patient Centered Rounds: I performed bedside rounds with nursing staff today  Discussions with Specialists or Other Care Team Provider: Case Management    Education and Discussions with Family / Patient: Updated  (daughter) at bedside  Time Spent for Care: 30 minutes  More than 50% of total time spent on counseling and coordination of care as described above  Current Length of Stay: 1 day(s)  Current Patient Status: Inpatient   Certification Statement: The patient will continue to require additional inpatient hospital stay due to infectiuos workup and acute kidney dysfunction requiring IVF and PT/OT evaluation  Discharge Plan: Anticipate discharge in 24-48 hrs to discharge location to be determined pending rehab evaluations  Code Status: Level 1 - Full Code    Subjective:   Patient states she feels okay  Patient stated that she is generally weak and fatigued still  Patient denies any active chest pain, shortness of breath, abdominal pain, nausea, vomiting, or diarrhea  Objective:     Vitals:   Temp (24hrs), Av 9 °F (37 2 °C), Min:98 3 °F (36 8 °C), Max:99 4 °F (37 4 °C)    Temp:  [98 3 °F (36 8 °C)-99 4 °F (37 4 °C)] 98 3 °F (36 8 °C)  HR:  [71-98] 98  Resp:  [15-20] 17  BP: (111-141)/(56-68) 111/56  SpO2:  [95 %-100 %] 97 %  Body mass index is 42 91 kg/m²  Input and Output Summary (last 24 hours): Intake/Output Summary (Last 24 hours) at 3/10/2022 0950  Last data filed at 3/10/2022 1514  Gross per 24 hour   Intake 1000 ml   Output 350 ml   Net 650 ml       Physical Exam:   Physical Exam  Vitals and nursing note reviewed  Constitutional:       General: She is not in acute distress       Appearance: She is obese  She is ill-appearing  HENT:      Head: Normocephalic  Nose: Nose normal  No congestion  Mouth/Throat:      Mouth: Mucous membranes are dry  Pharynx: Oropharynx is clear  Cardiovascular:      Rate and Rhythm: Normal rate and regular rhythm  Pulses: Normal pulses  Heart sounds: Normal heart sounds  No murmur heard  Pulmonary:      Effort: Pulmonary effort is normal  No respiratory distress  Breath sounds: Decreased breath sounds present  Abdominal:      General: Bowel sounds are normal  There is no distension  Palpations: Abdomen is soft  Tenderness: There is no abdominal tenderness  Musculoskeletal:         General: Tenderness (B/L LE) present  Normal range of motion  Cervical back: Normal range of motion  Right lower leg: No edema  Left lower leg: No edema  Skin:     General: Skin is warm and dry  Capillary Refill: Capillary refill takes less than 2 seconds  Neurological:      Mental Status: She is alert and oriented to person, place, and time  Mental status is at baseline  Motor: Weakness (Generalized) present  Psychiatric:         Mood and Affect: Mood normal          Speech: Speech normal          Behavior: Behavior is cooperative  Judgment: Judgment normal           Additional Data:     Labs:  Results from last 7 days   Lab Units 03/10/22  0615 03/09/22  1654 03/09/22  1654   WBC Thousand/uL 5 93   < > 7 93   HEMOGLOBIN g/dL 11 8   < > 13 0   HEMATOCRIT % 36 2   < > 39 8   PLATELETS Thousands/uL 169   < > 193   NEUTROS PCT %  --   --  74   LYMPHS PCT %  --   --  14   MONOS PCT %  --   --  8   EOS PCT %  --   --  3    < > = values in this interval not displayed       Results from last 7 days   Lab Units 03/10/22  0615 03/09/22  1654 03/09/22  1654   SODIUM mmol/L 143   < > 142   POTASSIUM mmol/L 4 1   < > 4 0   CHLORIDE mmol/L 113*   < > 110*   CO2 mmol/L 24   < > 22   BUN mg/dL 10   < > 9   CREATININE mg/dL 1 20   < > 1 54*   ANION GAP mmol/L 6   < > 10   CALCIUM mg/dL 8 5   < > 9 4   ALBUMIN g/dL  --   --  2 6*   TOTAL BILIRUBIN mg/dL  --   --  0 89   ALK PHOS U/L  --   --  52   ALT U/L  --   --  15   AST U/L  --   --  22   GLUCOSE RANDOM mg/dL 83   < > 109    < > = values in this interval not displayed  Results from last 7 days   Lab Units 03/09/22  1654   INR  1 53*             Results from last 7 days   Lab Units 03/10/22  0615 03/09/22  1929 03/09/22  1654   LACTIC ACID mmol/L  --  1 5 2 8*   PROCALCITONIN ng/ml 0 30*  --  0 18       Lines/Drains:  Invasive Devices  Report    Peripheral Intravenous Line            Peripheral IV 03/09/22 Right; Lower Forearm <1 day                      Imaging: Reviewed radiology reports from this admission including: chest xray    Recent Cultures (last 7 days):         Last 24 Hours Medication List:   Current Facility-Administered Medications   Medication Dose Route Frequency Provider Last Rate    acetaminophen  650 mg Oral Q4H PRN Kedar Brannon DO      apixaban  5 mg Oral BID Kedar Brannon DO      benzonatate  100 mg Oral TID PRN Kedar Brannon DO      cholecalciferol  1,000 Units Oral Daily Kedar Brannon DO      guaiFENesin  600 mg Oral Q12H PRN Kedar Brannon,       hydroxychloroquine  200 mg Oral BID Kedar Brannon DO      lidocaine  1 patch Topical Daily Kedar Brannon DO      metoprolol tartrate  25 mg Oral Q12H [facility] Kedar Brannon DO      multi-electrolyte  100 mL/hr Intravenous Continuous Kedar Brannon  mL/hr (03/10/22 0310)    ondansetron  4 mg Intravenous Q6H PRN Kedar Brannon DO      pantoprazole  40 mg Oral Early Morning Kedar Brannon DO      predniSONE  5 mg Oral BID With Meals Kedar Brannon DO          Today, Patient Was Seen By: AZIZA Soriano    **Please Note: This note may have been constructed using a voice recognition system  **

## 2022-03-10 NOTE — CASE MANAGEMENT
Case Management Discharge Planning Note    Patient name Heather Speaker  Location 2 213/CW2 213-02 MRN [id number]  : 1950 Date 3/10/2022       Current Admission Date: 3/9/2022  Current Admission Diagnosis:Acute renal failure (ARF) [facility])   Patient Active Problem List    Diagnosis Date Noted    Elevated troponin 2022    COVID-19 2022    Acute renal failure (ARF) ([facility] [address] ) 01/10/2022    Paroxysmal atrial fibrillation ([facility] [address] ) 01/10/2022    Continuous opioid dependence ([facility] ) 2021    Hyperparathyroidism ([facility] ) 2021    Medicare annual wellness visit, subsequent 2021    Murmur, cardiac 2021    Morbid obesity with BMI of 40 0-44 9, adult ([facility] ) 2019    BPPV (benign paroxysmal positional vertigo) 2018    Seasonal allergic rhinitis due to pollen 2018    Cough 2018    Erythroderma 10/27/2017    Osteoporosis 2016    Rheumatoid arthritis ([facility] ) 2016    GERD (gastroesophageal reflux disease) 2016    Sarcoid 2016    Morbid obesity ([facility] ) 2016    Vitamin D deficiency 2015    Benign essential hypertension 2014    Lumbar radiculopathy 2014      LOS (days): 1  Geometric Mean LOS (GMLOS) (days): 3 10  Days to GMLOS:2 4     OBJECTIVE:  Risk of Unplanned Readmission Score: 22         Current admission status: Inpatient   Preferred Pharmacy:   [id number] [address] [place] [address]  [facility] [address]  Phone: [phone] Fax: [phone], [facility] [address]  Phone: [phone] Fax: [phone]    Primary Care Provider: Julianne Torres DO    Primary Insurance: [facility]  Secondary Insurance:     DISCHARGE DETAILS:    Discharge planning discussed with[de-identified] patient        Additional Comments: Patient told  that she wants her daughter Janina Galan [phone] to provide information for assessment and DC needs    A message was left for Kecia Como and awaiting a call back for further assessment and DC needs

## 2022-03-10 NOTE — PHYSICAL THERAPY NOTE
Physical Therapy Evaluation     Patient's Name: Aditya Cruz    Admitting Diagnosis  Cough [R05 9]  Nausea and vomiting [R11 2]  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]  Elevated troponin I measurement [R77 8]  Suspected UTI [R39 89]    Problem List  Patient Active Problem List   Diagnosis    Rheumatoid arthritis ([facility] )    GERD (gastroesophageal reflux disease)    Sarcoid    Cough    Benign essential hypertension    Morbid obesity ([facility] )    Lumbar radiculopathy    Osteoporosis    Vitamin D deficiency    Erythroderma    BPPV (benign paroxysmal positional vertigo)    Seasonal allergic rhinitis due to pollen    Morbid obesity with BMI of 40 0-44 9, adult ([place] [address] )    Continuous opioid dependence ([place] [address] )    Hyperparathyroidism ([place] [address] )    Medicare annual wellness visit, subsequent    Murmur, cardiac    Acute renal failure (ARF) ([place] [address] )    Paroxysmal atrial fibrillation ([place] [address] )    COVID-19    Elevated troponin       Past Medical History  Past Medical History:   Diagnosis Date    Abnormal thyroid function test     last assessed: 2015     Arthritis     Caries     last assessed: 2016     Edema of right lower extremity     last assessed: 2015     GERD (gastroesophageal reflux disease)     Hypertension     Sarcoid        Past Surgical History  Past Surgical History:   Procedure Laterality Date     SECTION      MULTIPLE TOOTH EXTRACTIONS N/A 2016    Procedure: Surgical extraction of teeth 2, 18, 19, 30, 31; incision and drainage of left subperiosteal abscess ;  Surgeon: Gem Berman DMD;  Location:  MAIN OR;  Service:         03/10/22 1343   PT Last Visit   PT Visit Date 03/10/22   Note Type   Note type Evaluation   Pain Assessment   Pain Assessment Tool 0-10   Pain Score No Pain   Restrictions/Precautions   Braces or Orthoses   (denies)   Other Precautions Multiple lines; Bed Alarm   Home Living   Type of Home House  (reports she stays on the 2nd floor and does not come down)   Home Layout Two level  (10 steps up w/ hand rail; 1 high CHHAYA)   Home Equipment Walker;Cane   Prior Function   Level of [place] Independent with ADLs and functional mobility  (amb w/ SPC in the house and rw outside)   Lives With Daughter;Family   Receives Help From [facility] ([place], [place]) in the last 6 months 1 to 4  (one recent fall ("slid off the bed"; denies injuries))   General   Additional Pertinent History cleared for assessment by tobi   Cognition   Overall Cognitive Status [facility]   Arousal/Participation Alert   Orientation Level Oriented to person;Oriented to place;Oriented to situation   Memory Within functional limits   Following Commands Follows one step commands without difficulty   Subjective   Subjective Alert; in bed; responds to questions appropriately; pleasant and cooperative; agreeable to try mobilization   RUE Assessment   RUE Assessment WFL  (AROM)   LUE Assessment   LUE Assessment WFL  (AROM)   RLE Assessment   RLE Assessment WFL  (AROM)   Strength RLE   RLE Overall Strength   (fair + (grossly))   LLE Assessment   LLE Assessment WFL  (AROM)   Strength LLE   LLE Overall Strength   (fair + (grossly))   Bed Mobility   Supine to Sit 4  Minimal assistance   Additional items Assist x 1;HOB elevated; Increased time required;Verbal cues   Additional Comments Upon sitting on edge of bed, noted the bed pad was soiled and pt was informed that she will need some pericare --> performed by PCA while standing at bedside; RN informed  Transfers   Sit to Stand 4  Minimal assistance   Additional items Assist x 1;Verbal cues   Stand to Sit 4  Minimal assistance   Additional items Assist x 1;Verbal cues   Toilet transfer 4  Minimal assistance   Additional items Assist x 1;Standard toilet   Ambulation/Elevation   Gait pattern Excessively slow; Short stride; Foward flexed   Gait Assistance 4  Minimal assist   Additional items Assist x 1;Verbal cues; Tactile cues   Assistive Device Rolling walker   Distance 30 ft total distance; pt wished to go to the BR after amb; pt remained there at the end of session; nsg staff informed  Stair Management Assistance Not tested  (not appropriate at this time)   Balance   Static Sitting Fair +   Dynamic Sitting Fair   Static Standing Fair -   Dynamic Standing Fair -   Ambulatory Poor +   Activity Tolerance   Activity Tolerance Patient tolerated treatment well   Nurse Made Aware spoke to [id number], RN   Assessment   Prognosis Good   Problem List Decreased strength;Decreased endurance; Impaired balance;Decreased mobility;Obesity   Assessment Pt is 66 y o  female admitted with hx of cough, vomiting and fever and Dx of Acute renal failure (ARF)  Pt 's comorbidities affecting POC include: arthritis, edema of right lower extremity, hypertension, and sarcoid and personal factors of: CHHAYA and steps in the house  Pt's clinical presentation is currently unstable/unpredictable which is evident in abn lab values and  need for min assist w/ all phases of mobility when usually mobilizing independently  Pt presents w/ min generalized weakness, incl decreased LE strength, decreased functional endurance and inconsistent amb balance and gait patterns (already uses an AD at home)  Will cont to follow pt in PT for progressive mobilization to max level of (I), endurance, and safety  Otherwise, anticipate pt will return home w/ available family support upon D/C provided she cont improving w/ mobility skills, safety, and endurance (incl on the steps) and when medically cleared; home PT follow up is recommended; will follow  Barriers to Discharge Inaccessible home environment; Other (Comment)  (steps in the house)   Goals   Patient Goals to go to the BR   STG Expiration Date 03/20/22   Short Term Goal #1 7-10 days  Pt will amb 150 ft w/ rw <--> SPC, mod (I) in order to facilitate safe return to premorbid environment and to initiate return to community amb status   Pt will negotiate 1 step w/ appropriate assistive device, (S)x1 and 10 steps w/ hand rail and SPC, (S)x1 in order to assure safe navigation in and out of the premorbid living environment and between the levels of the house  Pt will achieve (I) level w/ bed mob in order to facilitate safety with OOB and back to bed transitions in own living environment  Pt will perform transfers w/ mod (I) to assure (I) and safety w/ functional mobility/transitions w/ all aspects of mobility/locomotion  Pt will participate in LE therex and balance activities to max progression w/ mobility skills  PT Treatment Day 0   Plan   Treatment/Interventions Functional transfer training;LE strengthening/ROM; Elevations; Therapeutic exercise; Endurance training;Bed mobility;Gait training;Spoke to nursing   PT Frequency 3-5x/wk   Recommendation   PT Discharge Recommendation Home with home health rehabilitation  (home PT)   Equipment Recommended [facility] Recommended Wheeled walker   [address] Inpatient   Turning in Bed Without Bedrails 3   Lying on Back to Sitting on Edge of Flat Bed 3   Moving Bed to Chair 3   Standing Up From Chair 3   Walk in Room 3   Climb 3-5 Stairs 2   Basic Mobility Inpatient Raw Score 17   Basic Mobility Standardized Score 39 67   Highest Level Of Mobility   JH-HLM Goal 5: Stand one or more mins   JH-HLM Highest Level of Mobility 7: Walk 25 feet or more   JH-HLM Goal Achieved Yes   Modified [place] Scale   Modified [place]el[place] Scale 4   End of Consult   Patient Position at End of Consult Other (comment)  (toilet in the BR; aware to call for (A); nsg notified)       Dioni Robertson, PT

## 2022-03-10 NOTE — PLAN OF CARE
Problem: PHYSICAL THERAPY ADULT  Goal: Performs mobility at highest level of function for planned discharge setting  See evaluation for individualized goals  Description: Treatment/Interventions: Functional transfer training,LE strengthening/ROM,Elevations,Therapeutic exercise,Endurance training,Bed mobility,Gait training,Spoke to nursing  Equipment Recommended: Velma Menon       See flowsheet documentation for full assessment, interventions and recommendations  Note: Prognosis: Good  Problem List: Decreased strength,Decreased endurance,Impaired balance,Decreased mobility,Obesity  Assessment: Pt is 66 y o  female admitted with hx of cough, vomiting and fever and Dx of Acute renal failure (ARF)  Pt 's comorbidities affecting POC include: arthritis, edema of right lower extremity, hypertension, and sarcoid and personal factors of: CHHAYA and steps in the house  Pt's clinical presentation is currently unstable/unpredictable which is evident in abn lab values and  need for min assist w/ all phases of mobility when usually mobilizing independently  Pt presents w/ min generalized weakness, incl decreased LE strength, decreased functional endurance and inconsistent amb balance and gait patterns (already uses an AD at home)  Will cont to follow pt in PT for progressive mobilization to max level of (I), endurance, and safety  Otherwise, anticipate pt will return home w/ available family support upon D/C provided she cont improving w/ mobility skills, safety, and endurance (incl on the steps) and when medically cleared; home PT follow up is recommended; will follow  Barriers to Discharge: Inaccessible home environment,Other (Comment) (steps in the house)        PT Discharge Recommendation: Home with home health rehabilitation (home PT)          See flowsheet documentation for full assessment

## 2022-03-10 NOTE — ASSESSMENT & PLAN NOTE
· POA  · Baseline approximately 0 8  · Possibly in setting of limited oral intake and reported vomiting  · U/A: Benign  · C/w IVF   · Monitor volume status and I/O's  · Measure PVR and bladder scan  · Limit/Avoid nephrotoxins  · Avoid hypotension and fluctuations in BP  · Continue to Monitor Renal function

## 2022-03-10 NOTE — ASSESSMENT & PLAN NOTE
· Recent diagnosis at prior hospitalization  · Currently in sinus rhythm  · Continue Home medications:  · Metoprolol tartrate and A/C with Eliquis

## 2022-03-10 NOTE — ED ATTENDING ATTESTATION
3/9/2022  IJl MD, saw and evaluated the patient  I have discussed the patient with the resident/non-physician practitioner and agree with the resident's/non-physician practitioner's findings, Plan of Care, and MDM as documented in the resident's/non-physician practitioner's note, except where noted  All available labs and Radiology studies were reviewed  I was present for key portions of any procedure(s) performed by the resident/non-physician practitioner and I was immediately available to provide assistance  At this point I agree with the current assessment done in the Emergency Department  I have conducted an independent evaluation of this patient a history and physical is as follows:    ED Course    65 yo f with PMHx for sepsis presenting with fever cough and weakness generalized       Dry MM  RRR no murmurs     Lungs CTAN     Abdomen soft NT ND + BS  Extremities no ededma    Impression fever cough - will check labs cx ecg troponin cxr ua - anticipate admission   Critical Care Time  Procedures

## 2022-03-10 NOTE — ASSESSMENT & PLAN NOTE
· Cough with reported posttussive emesis for past 2 days, additionally reported fever at home to 101 5° F  Patient with overall generalized fatigue/weakness  · Patient has been afebrile here and without leukocytosis  · CXR to wet read without evidence of acute cardiopulmonary disease (will follow-up final read)  · Procalcitonin: 0 18-->0  3  · COVID-19/influenza/RSV PCR: Negative  · BC x2: Pending  · S/P cefepime in ED, Will hold off Abx at this time  · Follow up culture results  · Supportive care otherwise in the interim  · PT/OT evlautaiton: Pending

## 2022-03-10 NOTE — ASSESSMENT & PLAN NOTE
· Troponin 64->70-->68  · Patient without complaints of chest pain  · EKG without acute ischemic change  · Suspect demand ischemia secondary to acute renal failure and possible infectious etiology      · Monitor for development of cardiac symptoms

## 2022-03-10 NOTE — ED NOTES
Unable to obtain urine sample from straight cath at this time after multiple attempts; Dr Shilo Balderas aware and okay with starting more fluids and IV ABX ; Wilber applied at this time      Ramakrishna Sarkar, DESTINI  03/09/22 [address] [facility], DESTINI  03/09/22 9161

## 2022-03-10 NOTE — ASSESSMENT & PLAN NOTE
· Continue Home medications:  · Metoprolol tartrate 25 mg b i d   With strict hold parameters  · Hold HCTZ in setting of acute renal failure  · Monitor blood pressure per protocol

## 2022-03-10 NOTE — ASSESSMENT & PLAN NOTE
· Follows with Rheumatology (Dr Emerita Boone)  · Continue prednisone 5 mg b i d , Plaquenil    Patient stating she is no longer taking Humira

## 2022-03-11 PROBLEM — R78.81 POSITIVE BLOOD CULTURE: Status: ACTIVE | Noted: 2022-03-11

## 2022-03-11 PROBLEM — L40.9 PSORIASIS: Status: ACTIVE | Noted: 2022-03-11

## 2022-03-11 LAB
ANION GAP SERPL CALCULATED.3IONS-SCNC: 3 MMOL/L (ref 4–13)
BASOPHILS # BLD AUTO: 0.02 THOUSANDS/ΜL (ref 0–0.1)
BASOPHILS NFR BLD AUTO: 1 % (ref 0–1)
BUN SERPL-MCNC: 11 MG/DL (ref 5–25)
CALCIUM SERPL-MCNC: 8.4 MG/DL (ref 8.3–10.1)
CHLORIDE SERPL-SCNC: 113 MMOL/L (ref 100–108)
CHOLEST SERPL-MCNC: 123 MG/DL
CO2 SERPL-SCNC: 25 MMOL/L (ref 21–32)
CREAT SERPL-MCNC: 1.05 MG/DL (ref 0.6–1.3)
EOSINOPHIL # BLD AUTO: 0.23 THOUSAND/ΜL (ref 0–0.61)
EOSINOPHIL NFR BLD AUTO: 5 % (ref 0–6)
ERYTHROCYTE [DISTWIDTH] IN BLOOD BY AUTOMATED COUNT: 15.6 % (ref 11.6–15.1)
GFR SERPL CREATININE-BSD FRML MDRD: 52 ML/MIN/1.73SQ M
GLUCOSE SERPL-MCNC: 91 MG/DL (ref 65–140)
HCT VFR BLD AUTO: 35.2 % (ref 34.8–46.1)
HDLC SERPL-MCNC: 41 MG/DL
HGB BLD-MCNC: 10.8 G/DL (ref 11.5–15.4)
IMM GRANULOCYTES # BLD AUTO: 0.01 THOUSAND/UL (ref 0–0.2)
IMM GRANULOCYTES NFR BLD AUTO: 0 % (ref 0–2)
LDLC SERPL CALC-MCNC: 66 MG/DL (ref 0–100)
LYMPHOCYTES # BLD AUTO: 0.77 THOUSANDS/ΜL (ref 0.6–4.47)
LYMPHOCYTES NFR BLD AUTO: 18 % (ref 14–44)
MCH RBC QN AUTO: 27 PG (ref 26.8–34.3)
MCHC RBC AUTO-ENTMCNC: 30.7 G/DL (ref 31.4–37.4)
MCV RBC AUTO: 88 FL (ref 82–98)
MONOCYTES # BLD AUTO: 0.41 THOUSAND/ΜL (ref 0.17–1.22)
MONOCYTES NFR BLD AUTO: 9 % (ref 4–12)
NEUTROPHILS # BLD AUTO: 2.93 THOUSANDS/ΜL (ref 1.85–7.62)
NEUTS SEG NFR BLD AUTO: 67 % (ref 43–75)
NONHDLC SERPL-MCNC: 82 MG/DL
NRBC BLD AUTO-RTO: 0 /100 WBCS
PLATELET # BLD AUTO: 172 THOUSANDS/UL (ref 149–390)
PMV BLD AUTO: 12.7 FL (ref 8.9–12.7)
POTASSIUM SERPL-SCNC: 3.9 MMOL/L (ref 3.5–5.3)
PROCALCITONIN SERPL-MCNC: 0.26 NG/ML
RBC # BLD AUTO: 4 MILLION/UL (ref 3.81–5.12)
SODIUM SERPL-SCNC: 141 MMOL/L (ref 136–145)
TRIGL SERPL-MCNC: 81 MG/DL
WBC # BLD AUTO: 4.37 THOUSAND/UL (ref 4.31–10.16)

## 2022-03-11 PROCEDURE — 80048 BASIC METABOLIC PNL TOTAL CA: CPT

## 2022-03-11 PROCEDURE — 99232 SBSQ HOSP IP/OBS MODERATE 35: CPT | Performed by: NURSE PRACTITIONER

## 2022-03-11 PROCEDURE — 85025 COMPLETE CBC W/AUTO DIFF WBC: CPT

## 2022-03-11 PROCEDURE — 84145 PROCALCITONIN (PCT): CPT | Performed by: NURSE PRACTITIONER

## 2022-03-11 PROCEDURE — 80061 LIPID PANEL: CPT | Performed by: NURSE PRACTITIONER

## 2022-03-11 PROCEDURE — 87040 BLOOD CULTURE FOR BACTERIA: CPT | Performed by: NURSE PRACTITIONER

## 2022-03-11 RX ORDER — LANOLIN ALCOHOL/MO/W.PET/CERES
CREAM (GRAM) TOPICAL 3 TIMES DAILY
Status: DISCONTINUED | OUTPATIENT
Start: 2022-03-11 | End: 2022-03-15 | Stop reason: HOSPADM

## 2022-03-11 RX ADMIN — VANCOMYCIN HYDROCHLORIDE 2000 MG: 1 INJECTION, POWDER, LYOPHILIZED, FOR SOLUTION INTRAVENOUS at 13:29

## 2022-03-11 RX ADMIN — PANTOPRAZOLE SODIUM 40 MG: 40 TABLET, DELAYED RELEASE ORAL at 06:26

## 2022-03-11 RX ADMIN — PREDNISONE 5 MG: 5 TABLET ORAL at 08:00

## 2022-03-11 RX ADMIN — ACETAMINOPHEN 650 MG: 325 TABLET ORAL at 23:54

## 2022-03-11 RX ADMIN — TRIAMCINOLONE ACETONIDE: 1 OINTMENT TOPICAL at 17:07

## 2022-03-11 RX ADMIN — METOPROLOL TARTRATE 25 MG: 25 TABLET, FILM COATED ORAL at 21:30

## 2022-03-11 RX ADMIN — Medication 1000 UNITS: at 08:51

## 2022-03-11 RX ADMIN — HYDROXYCHLOROQUINE SULFATE 200 MG: 200 TABLET, FILM COATED ORAL at 17:07

## 2022-03-11 RX ADMIN — APIXABAN 5 MG: 5 TABLET, FILM COATED ORAL at 17:06

## 2022-03-11 RX ADMIN — Medication: at 21:30

## 2022-03-11 RX ADMIN — LIDOCAINE 5% 1 PATCH: 700 PATCH TOPICAL at 08:51

## 2022-03-11 RX ADMIN — Medication: at 17:00

## 2022-03-11 RX ADMIN — PREDNISONE 5 MG: 5 TABLET ORAL at 17:06

## 2022-03-11 RX ADMIN — APIXABAN 5 MG: 5 TABLET, FILM COATED ORAL at 08:51

## 2022-03-11 NOTE — ASSESSMENT & PLAN NOTE
· Follows with Rheumatology (Dr Salome Phillips)  · Continue prednisone 5 mg b i d , Plaquenil    Patient stating she is no longer taking Humira

## 2022-03-11 NOTE — CONSULTS
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  The patient's symptoms of fever, nausea, vomiting, could also be exacerbated or caused by her erythrodermic state from the pustular psoriasis  - Apply triamcinolone 0 1% ointment 2x/day to most bothersome areas on body (not face, groin areas) for 2 weeks  Apply prescription ointment before applying moisturizing ointment (eucerin)  - Recommend applying liberal amount of eucerin ointment (or its hospital equivalent) to be applied to all affected areas 3-4x/day  This is should be done especially after shower (do not fully dry skin before applying eucerin ointment)  This should continue after discharge  - Would recommend checking a lipid panel, and if okay would start patient on low dose acitretin 10mg once daily for 2 weeks  Will need repeat CBC, CMP, lipid panel in 2 weeks  - Please make sure to contact dermatology when patient is being discharged to set up outpatient follow-up and please give her our office number below       Please set up discharge follow up by calling our office: [phone])     Thank you for involving me in the care of your patient  Please call with questions, change in clinical status or if tests recommended above are abnormal      The patient was discussed with Dr Nicholas Gaston  Dermatology PGY-3 Resident Physician     History:     HISTORY OF PRESENT ILLNESS:    Reason for Consult: Rash    HPI: Brian Bush is a 66y o  year old female who presented at the last hospital visit for fatigue and was found to be septic and new onset atrial fibrillation  Dermatology was consulted for patient's extensive rash that has been present for five years but is worsening recently       The patient and her daughter state that they think this rash started after patient received an antibiotic five years ago  Patient states she was seen by a dermatologist in the past who biopsied it; however, she is unsure of what the rash was  No treatment was given  She also has rheumatoid arthritis and was started on Humira- does not think the rash improved on the Humira and stopped the humira a month ago      The rash was biopsied and was 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)  DIF was negative  Triamcinolone was prescribed for itchy areas and per patient, the rash improved but then has started to spread since then  Asked why she didn't  the phone when dermatology called multiple times to arrange for outpatient follow-up, patient stated unless she knows the number, she does not   This time the patient presents to the hospital for worsening fatigue, diminished appetite, along with a productive cough with intermittent vomiting, and intermittent fevers  A sepsis workup was initiated and the patient was started on antibiotics  Patient states that the rash can be very itchy especially on her back       PAST MEDICAL HISTORY:  Past Medical History:   Diagnosis Date    Abnormal thyroid function test     last assessed: 2015     Arthritis     Caries     last assessed: 2016     Edema of right lower extremity     last assessed: 2015     GERD (gastroesophageal reflux disease)     Hypertension     Sarcoid      Past Surgical History:   Procedure Laterality Date     SECTION      MULTIPLE TOOTH EXTRACTIONS N/A 8/27/2016    Procedure: Surgical extraction of teeth 2, 18, 19, 30, 31; incision and drainage of left subperiosteal abscess ;  Surgeon: Vandana Coker DMD;  Location: [facility];  Service:        FAMILY HISTORY:  Non-contributory    SOCIAL HISTORY:  Social History   Single  Social History     Substance and Sexual Activity   Alcohol Use No     Social History     Substance and Sexual Activity   Drug Use No     Social History     Tobacco Use   Smoking Status Never Smoker   Smokeless Tobacco Never Used       ALLERGIES:  Allergies   Allergen Reactions    Methotrexate Derivatives     Shellfish-Derived Products - Food Allergy Itching    Amoxicillin Rash    Ampicillin-Sulbactam Sodium Rash       MEDICATIONS:  All current active medications have been reviewed         Current Facility-Administered Medications:     acetaminophen (TYLENOL) tablet 650 mg, 650 mg, Oral, Q4H PRN, Ivar Stoney, DO, 650 mg at 03/10/22 2142    apixaban (ELIQUIS) tablet 5 mg, 5 mg, Oral, BID, Ivar Stoney, DO, 5 mg at 03/10/22 1718    benzonatate (TESSALON PERLES) capsule 100 mg, 100 mg, Oral, TID PRN, Ivar Stoney, DO    cholecalciferol (VITAMIN D3) tablet 1,000 Units, 1,000 Units, Oral, Daily, Ivar Stoney, DO, 1,000 Units at 03/10/22 0818    guaiFENesin (MUCINEX) 12 hr tablet 600 mg, 600 mg, Oral, Q12H PRN, Ivar Stoney, DO    hydroxychloroquine (PLAQUENIL) tablet 200 mg, 200 mg, Oral, BID, Ivar Stoney, DO, 200 mg at 03/10/22 1718    lidocaine (LIDODERM) 5 % patch 1 patch, 1 patch, Topical, Daily, Ivar Stoney, DO, 1 patch at 03/10/22 0819    metoprolol tartrate (LOPRESSOR) tablet 25 mg, 25 mg, Oral, Q12H [facility], Ivar Stoney, DO, 25 mg at 03/10/22 0818    ondansetron (ZOFRAN) injection 4 mg, 4 mg, Intravenous, Q6H PRN, Ivar Stoney, DO    pantoprazole (PROTONIX) EC tablet 40 mg, 40 mg, Oral, Early Morning, Ivar Stoney, DO, 40 mg at 03/10/22 1455    predniSONE tablet 5 mg, 5 mg, Oral, BID With Meals, [facility] DO Navdeep, 5 mg at 03/10/22 1718      Physical exam:     Temp:  [98 2 °F (36 8 °C)-98 8 °F (37 1 °C)] 98 8 °F (37 1 °C)  HR:  [87-98] 87  Resp:  [15-17] 17  BP: (108-123)/(56-57) 109/56  SpO2:  [93 %-98 %] 93 %  Temp (24hrs), Av 4 °F (36 9 °C), Min:98 2 °F (36 8 °C), Max:98 8 °F (37 1 °C)  Current: Temperature: 98 8 °F (37 1 °C)    PSYCH: Normal mood and affect  EYES: Normal conjunctiva  ENT: Normal lips and oral mucosa  CARDIOVASCULAR: No edema  RESPIRATORY: Normal respirations     Diffuse erythema with xerosis and many scaly patches                           Labs, Imaging, & Other Studies     Lab Results:  I have personally reviewed pertinent labs  Results from last 7 days   Lab Units 22  0428 03/10/22  0615 22  1654   WBC Thousand/uL 4 37 5 93 7 93   HEMOGLOBIN g/dL 10 8* 11 8 13 0   PLATELETS Thousands/uL 172 169 193     Results from last 7 days   Lab Units 22  0428 03/10/22  0615 22  1654   POTASSIUM mmol/L 3 9   < > 4 0   CHLORIDE mmol/L 113*   < > 110*   CO2 mmol/L 25   < > 22   BUN mg/dL 11   < > 9   CREATININE mg/dL 1 05   < > 1 54*   EGFR ml/min/1 73sq m 52   < > 33   CALCIUM mg/dL 8 4   < > 9 4   AST U/L  --   --  22   ALT U/L  --   --  15   ALK PHOS U/L  --   --  52    < > = values in this interval not displayed  Results from last 7 days   Lab Units 22  1716 22  1654   BLOOD CULTURE  Received in Microbiology Lab  Culture in Progress  Received in Microbiology Lab  Culture in Progress  Pathology:   I have personally reviewed pertinent reports  Final Diagnosis   A  Skin, right medial upper arm, punch biopsy:  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

## 2022-03-11 NOTE — PROGRESS NOTES
[facility]  Progress Note - [place] 1950, 66 y o  female MRN: [id number]  Unit/Bed#: CW2 213-02 Encounter: [id number]  Primary Care Provider: Shala Howell DO   Date and time admitted to hospital: 3/9/2022  4:30 PM    * Acute renal failure (ARF) [facility])  Assessment & Plan  Lab Results   Component Value Date    CREATININE 1 05 03/11/2022    CREATININE 1 20 03/10/2022    CREATININE 1 54 (H) 03/09/2022   POA  Baseline approximately 0 8  · Possibly in setting of limited oral intake and reported vomiting  · U/A: Benign  · D/c ivfs today and monitor off of fluids  · Monitor volume status and I/O's  · Measure PVR and bladder scan  · Limit/Avoid nephrotoxins: hold HCTZ  · Avoid hypotension and fluctuations in BP  · Continue to Monitor Renal function- repeat am bmp     Positive blood culture  Assessment & Plan  · 1/2 bottles positive for: Gram + cocci in clusters   · Follow to end points  · Repeat cultures sent   · Start IV vanco- pharmacy consulted  · Consider ID pending cultures   · Trend procal-- 0 30; repeat level this am     Cough  Assessment & Plan  · Cough with reported posttussive emesis for past 2 days, additionally reported fever at home to 101 5° F   Patient with overall generalized fatigue/weakness  · CXR 3/10- no evidence of acute cardiopulmonary disease   · Procalcitonin: 0 18-->0 3--> repeat procal level now   · COVID-19/influenza/RSV PCR: Negative  · BC x2: 1/2 bottles + Gram + cocci in clusters- follow to end points  · Repeat cultures sent and pending   · Start IV vanco, pharmacy consult   · Consider repeat chest xray in am in the setting of elevated procal level and cough   · Supportive care otherwise in the interim  · PT/OT: recommending home with PT/OT/VNA    Psoriasis  Assessment & Plan  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   · Recommendations are as follows: Apply triamcinolone 0 1% ointment 2x/day to most bothersome areas on body (not face, groin areas) for 2 weeks  Apply prescription ointment before applying moisturizing ointment (eucerin)  Recommend applying liberal amount of eucerin ointment (or its hospital equivalent) to be applied to all affected areas 3-4x/day  This is should be done especially after shower (do not fully dry skin before applying eucerin ointment)  This should continue after discharge  · check a lipid panel, and if okay would start patient on low dose acitretin 10mg once daily for 2 weeks  · Will need repeat CBC, CMP, lipid panel in 2 weeks  · F/u with Derm after discharge     Elevated troponin  Assessment & Plan  · Troponin 64->70-->68  · Patient without complaints of chest pain  · EKG without acute ischemic change  · Suspect demand ischemia secondary to acute renal failure and possible infectious etiology  · Monitor for development of cardiac symptoms    Paroxysmal atrial fibrillation (HCC)  Assessment & Plan  · Recent diagnosis at prior hospitalization  · Currently in sinus rhythm  · Continue Home medications:  · Metoprolol tartrate and A/C with Eliquis    Morbid obesity with BMI of 40 0-44 9, adult (HCC)  Assessment & Plan  · Encourage Lifestyle modifications    Benign essential hypertension  Assessment & Plan  · Continue Home medications:  · Metoprolol tartrate 25 mg b i d  With strict hold parameters  · Hold HCTZ in setting of acute renal failure  · Monitor blood pressure per protocol    Rheumatoid arthritis ([facility] [address] )  Assessment & Plan  · Follows with Rheumatology (Dr Janak Jason)  · Continue prednisone 5 mg b i d , Plaquenil  Patient stating she is no longer taking Humira        VTE Pharmacologic Prophylaxis: VTE Score: 6 High Risk (Score >/= 5) - Pharmacological DVT Prophylaxis Ordered: apixaban (Eliquis)  Sequential Compression Devices Ordered  Patient Centered Rounds: I performed bedside rounds with nursing staff today    Discussions with Specialists or Other Care Team Provider: d/w RN     Education and Discussions with Family / Patient: Updated  (daughter) via phone  Time Spent for Care: 30 minutes  More than 50% of total time spent on counseling and coordination of care as described above  Current Length of Stay: 2 day(s)  Current Patient Status: Inpatient   Certification Statement: The patient will continue to require additional inpatient hospital stay due to follow blood cultures   Discharge Plan: Anticipate discharge in 24-48 hrs to home with home services  Code Status: Level 1 - Full Code    Subjective:   Pt denies any complaints  Reports she is feeling better and denies any complaints  Objective:     Vitals:   Temp (24hrs), Av 4 °F (36 9 °C), Min:98 2 °F (36 8 °C), Max:98 8 °F (37 1 °C)    Temp:  [98 2 °F (36 8 °C)-98 8 °F (37 1 °C)] 98 3 °F (36 8 °C)  HR:  [81-87] 81  Resp:  [18] 18  BP: ()/(50-56) 97/50  SpO2:  [93 %-98 %] 98 %  Body mass index is 43 77 kg/m²  Input and Output Summary (last 24 hours): Intake/Output Summary (Last 24 hours) at 3/11/2022 1507  Last data filed at 3/11/2022 1100  Gross per 24 hour   Intake 620 ml   Output 900 ml   Net -280 ml       Physical Exam:   Physical Exam  Vitals and nursing note reviewed  Constitutional:       General: She is not in acute distress  Appearance: She is obese  Cardiovascular:      Rate and Rhythm: Normal rate and regular rhythm  Heart sounds: Normal heart sounds  No murmur heard  Pulmonary:      Effort: Pulmonary effort is normal  No respiratory distress  Breath sounds: Normal breath sounds  No wheezing or rales  Abdominal:      General: Bowel sounds are normal  There is no distension  Palpations: Abdomen is soft  Tenderness: There is no abdominal tenderness  Musculoskeletal:         General: No swelling or tenderness  Skin:     General: Skin is warm  Findings: Rash (psoriasis- generalized ) present  No erythema  Neurological:      Mental Status: She is alert  Mental status is at baseline  Cranial Nerves: No cranial nerve deficit  Psychiatric:         Mood and Affect: Mood normal           Additional Data:     Labs:  Results from last 7 days   Lab Units 03/11/22  0428   WBC Thousand/uL 4 37   HEMOGLOBIN g/dL 10 8*   HEMATOCRIT % 35 2   PLATELETS Thousands/uL 172   NEUTROS PCT % 67   LYMPHS PCT % 18   MONOS PCT % 9   EOS PCT % 5     Results from last 7 days   Lab Units 03/11/22  0428 03/10/22  0615 03/09/22  1654   SODIUM mmol/L 141   < > 142   POTASSIUM mmol/L 3 9   < > 4 0   CHLORIDE mmol/L 113*   < > 110*   CO2 mmol/L 25   < > 22   BUN mg/dL 11   < > 9   CREATININE mg/dL 1 05   < > 1 54*   ANION GAP mmol/L 3*   < > 10   CALCIUM mg/dL 8 4   < > 9 4   ALBUMIN g/dL  --   --  2 6*   TOTAL BILIRUBIN mg/dL  --   --  0 89   ALK PHOS U/L  --   --  52   ALT U/L  --   --  15   AST U/L  --   --  22   GLUCOSE RANDOM mg/dL 91   < > 109    < > = values in this interval not displayed  Results from last 7 days   Lab Units 03/09/22  1654   INR  1 53*             Results from last 7 days   Lab Units 03/11/22  1233 03/10/22  0615 03/09/22  1929 03/09/22  1654   LACTIC ACID mmol/L  --   --  1 5 2 8*   PROCALCITONIN ng/ml 0 26* 0 30*  --  0 18       Lines/Drains:  Invasive Devices  Report    Peripheral Intravenous Line            Peripheral IV 03/09/22 Right; Lower Forearm 1 day                      Imaging: Reviewed radiology reports from this admission including: chest xray    Recent Cultures (last 7 days):   Results from last 7 days   Lab Units 03/09/22  1716 03/09/22  1654   BLOOD CULTURE  No Growth at 24 hrs   --    GRAM STAIN RESULT   --  Gram positive cocci in clusters*       Last 24 Hours Medication List:   Current Facility-Administered Medications   Medication Dose Route Frequency Provider Last Rate    acetaminophen  650 mg Oral Q4H PRN [place], DO      apixaban  5 mg Oral BID [place], DO      benzonatate  100 mg Oral TID PRN [place], DO     Gopal Beto cholecalciferol  1,000 Units Oral Daily Brittney Pillow, DO      guaiFENesin  600 mg Oral Q12H PRN Sylvie Pillow, DO      hydroxychloroquine  200 mg Oral BID Brittney Pillow, DO      lidocaine  1 patch Topical Daily Brittney Pillow, DO      metoprolol tartrate  25 mg Oral Q12H [address] Brittney Pillow, DO      ondansetron  4 mg Intravenous Q6H PRN Brittney Pillow, DO      pantoprazole  40 mg Oral Early Morning Brittney Pillow, DO      predniSONE  5 mg Oral BID With Meals Brittney Pillow, DO      triamcinolone   Topical BID AZIZA Sandhu      [START ON 3/12/2022] vancomycin  15 mg/kg (Adjusted) Intravenous Q24H AZIZA Sandhu      vancomycin  25 mg/kg (Adjusted) Intravenous Once AZIZA Sandhu 2,000 mg (03/11/22 1329)    white petrolatum-mineral oil   Topical TID AZIZA Sandhu          Today, Patient Was Seen By: AZIZA Sandhu    **Please Note: This note may have been constructed using a voice recognition system  **

## 2022-03-11 NOTE — ASSESSMENT & PLAN NOTE
· Cough with reported posttussive emesis for past 2 days, additionally reported fever at home to 101 5° F   Patient with overall generalized fatigue/weakness  · CXR 3/10- no evidence of acute cardiopulmonary disease   · Procalcitonin: 0 18-->0 3--> repeat procal level now   · COVID-19/influenza/RSV PCR: Negative  · BC x2: 1/2 bottles + Gram + cocci in clusters- follow to end points  · Repeat cultures sent and pending   · Start IV vanco, pharmacy consult   · Consider repeat chest xray in am in the setting of elevated procal level and cough   · Supportive care otherwise in the interim  · PT/OT: recommending home with PT/OT/VNA

## 2022-03-11 NOTE — ASSESSMENT & PLAN NOTE
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   · Recommendations are as follows: Apply triamcinolone 0 1% ointment 2x/day to most bothersome areas on body (not face, groin areas) for 2 weeks  Apply prescription ointment before applying moisturizing ointment (eucerin)  Recommend applying liberal amount of eucerin ointment (or its hospital equivalent) to be applied to all affected areas 3-4x/day  This is should be done especially after shower (do not fully dry skin before applying eucerin ointment)  This should continue after discharge  · check a lipid panel, and if okay would start patient on low dose acitretin 10mg once daily for 2 weeks  · Will need repeat CBC, CMP, lipid panel in 2 weeks     · F/u with Derm after discharge

## 2022-03-11 NOTE — CASE MANAGEMENT
Case Management Assessment & Discharge Planning Note    Patient name Tariq Ayala  Location 2 213/2 213-02 MRN [id number]  : 1950 Date 3/11/2022       Current Admission Date: 3/9/2022  Current Admission Diagnosis:Acute renal failure (ARF) [facility])   Patient Active Problem List    Diagnosis Date Noted    Elevated troponin 2022    COVID-19 2022    Acute renal failure (ARF) ([facility] ) 01/10/2022    Paroxysmal atrial fibrillation ([facility] ) 01/10/2022    Continuous opioid dependence ([facility] ) 2021    [facility]parathyroidism [facility] 2021    Medicare annual wellness visit, subsequent 2021    Murmur, cardiac 2021    Morbid obesity with BMI of 40 0-44 9, adult ([facility] ) 2019    BPPV (benign paroxysmal positional vertigo) 2018    Seasonal allergic rhinitis due to pollen 2018    Cough 2018    Erythroderma 10/27/2017    Osteoporosis 2016    Rheumatoid arthritis ([facility] ) 2016    GERD (gastroesophageal reflux disease) 2016    Sarcoid 2016    Morbid obesity ([facility] ) 2016    Vitamin D deficiency 2015    Benign essential hypertension 2014    Lumbar radiculopathy 2014      LOS (days): 2  Geometric Mean LOS (GMLOS) (days): 3 10  Days to GMLOS:1 5     OBJECTIVE:    Risk of Unplanned Readmission Score: 24      Current admission status: Inpatient       Preferred Pharmacy:   [facility] [address] [place] [id number]  Phone: [phone] Fax: [phone] [address] [address]  Phone: [phone] Fax: [phone]    Primary Care Provider: Rica Doll DO    Primary Insurance: [facility]  Secondary Insurance:     ASSESSMENT:  Anel 26 Proxies    There are no active Health Care Proxies on file         Readmission Root Cause  30 Day Readmission: No    Patient Information  Admitted from[de-identified] Home  Mental Status: Alert  During Assessment patient was accompanied by:  (wants daughter Kecia Elizabeth to coordinate information for assessment and DC needs)  Assessment information provided by[de-identified] Daughter ([place] [phone])  Primary Caregiver: Family  Caregiver's Name[de-identified] Picketbrigida [phone]  Caregiver's Relationship to Patient[de-identified] Family Member  Caregiver's Telephone Number[de-identified] [phone]  Support Systems: Family members  [place] of Residence: [address] [place] [address] do you live in?: [address] [facility] entry access options  Select all that apply : Stairs  Number of steps to enter home  : 1  Do the steps have railings?: No  Type of Current Residence: 2 story home  Upon entering residence, is there a bedroom on the main floor (no further steps)?: No  A bedroom is located on the following floor levels of residence (select all that apply):: 2nd Floor  Upon entering residence, is there a bathroom on the main floor (no further steps)?: No  Number of steps to 2nd floor from main floor: One Flight  In the last 12 months, was there a time when you were not able to pay the mortgage or rent on time?: No  In the last 12 months, how many places have you lived?: 1  In the last 12 months, was there a time when you did not have a steady place to sleep or slept in a shelter (including now)?: No  Homeless/housing insecurity resource given?: N/A  Living Arrangements: Lives w/ Daughter  Is patient a ?: No    Activities of Daily Living Prior to Admission  Functional Status: Assistance  Completes ADLs independently?: No  Level of ADL dependence: Assistance  Ambulates independently?: Yes  Does patient use assisted devices?: Yes  Assisted Devices (DME) used: Walker,Straight Cane,Bedside Commode  Does patient currently own DME?: Yes  What DME does the patient currently own?: Bedside Commode,Straight Cane,Walker  Does patient have a history of Outpatient Therapy (PT/OT)?: No  Does the patient have a history of Short-Term Rehab?: Yes ([facility])  Does patient have a history of HHC?: Yes  Does patient currently have [facility]?: Yes ([address] [facility]  [phone])    Current Home Health Care  Type of Current Home Care Services: Home PT,Nurse visit  [address][de-identified] Other (please enter name in comment) ([address])  [facility] Provider[de-identified] PCP    Patient Information Continued  Income Source: Pension/longterm  Does patient have prescription coverage?: Yes  Within the past 12 months, you worried that your food would run out before you got the money to buy more : Never true  Within the past 12 months, the food you bought just didnt last and you didnt have money to get more : Never true  Food insecurity resource given?: N/A  Does patient receive dialysis treatments?: No  Does patient have a history of substance abuse?: No  Does patient have a history of Mental Health Diagnosis?: No    Means of Transportation  In the past 12 months, has lack of transportation kept you from medical appointments or from getting medications?: No  In the past 12 months, has lack of transportation kept you from meetings, work, or from getting things needed for daily living?: No  Was application for public transport provided?: N/A        DISCHARGE DETAILS:    Discharge planning discussed with[de-identified] Daughter via phone  Freedom of Choice: Yes  Comments - Freedom of Choice: established with [facility]  [phone]  CM contacted family/caregiver?: Yes  Were Treatment Team discharge recommendations reviewed with patient/caregiver?: Yes  Did patient/caregiver verbalize understanding of patient care needs?: Yes  Were patient/caregiver advised of the risks associated with not following Treatment Team discharge recommendations?: Yes    Contacts  Patient Contacts: Daughter Sonia Rhoades  Relationship to Patient[de-identified] Family  Contact Method: Phone  Phone Number: [phone]  Reason/Outcome: Continuity of [address]         Is the patient interested in [facility] at discharge?: Yes  [address] requested[de-identified] Physical [facility] Name[de-identified] Other ([address] [phone])  [address]  Provider[de-identified] PCP  Home Health Services Needed[de-identified] Gait/ADL Training,Evaluate Functional Status and Safety,Strengthening/Theraputic Exercises to Improve Function  Supporting Clincal Findings[de-identified] Limited Endurance,Fatigues Easliy in Short Distances         Other Referral/Resources/Interventions Provided:  Interventions: [facility]  Referral Comments: Referral sent and accepted for resumption of care w/ 1st [facility]    Treatment Team Recommendation: Home with [address]  Discharge Destination Plan[de-identified] Home with [address]      Additional Comments: Daughter Juanis Whipple [phone] says the preference is to continue [facility] with established agency but if needed with consider STR if indicated  Rubin Ring said patient does not want to go back to [facility] if STR is indicated

## 2022-03-11 NOTE — PROGRESS NOTES
Vancomycin Assessment    Bhavna Plata is a 66 y o  female who is currently receiving vancomycin 1750mg iv q12h for bacteremia     Relevant clinical data and objective history reviewed:  Creatinine   Date Value Ref Range Status   03/11/2022 1 05 0 60 - 1 30 mg/dL Final     Comment:     Standardized to IDMS reference method   03/10/2022 1 20 0 60 - 1 30 mg/dL Final     Comment:     Standardized to IDMS reference method   03/09/2022 1 54 (H) 0 60 - 1 30 mg/dL Final     Comment:     Standardized to IDMS reference method     BP 97/50   Pulse 81   Temp 98 3 °F (36 8 °C)   Resp 18   Ht 5' 4" (1 626 m)   Wt 116 kg (255 lb)   SpO2 98%   BMI 43 77 kg/m²   I/O last 3 completed shifts: In: 240 [P O :240]  Out: 1310 [Urine:1310]  Lab Results   Component Value Date/Time    BUN 11 03/11/2022 04:28 AM    WBC 4 37 03/11/2022 04:28 AM    HGB 10 8 (L) 03/11/2022 04:28 AM    HCT 35 2 03/11/2022 04:28 AM    MCV 88 03/11/2022 04:28 AM     03/11/2022 04:28 AM     Temp Readings from Last 3 Encounters:   03/11/22 98 3 °F (36 8 °C)   01/26/22 98 4 °F (36 9 °C)   12/02/21 98 1 °F (36 7 °C)     Vancomycin Days of Therapy: 1    Assessment/Plan  The patient is ordered vancomycin utilizing scheduled dosing based on adjusted body weight (due to obesity)  Baseline risks associated with therapy include: advanced age  The patient is currently receiving 1750mg iv q12h and after clinical evaluation will be changed to 2000mg iv x 1 then 1250mg iv q24h  Pharmacy will also follow closely for s/sx of nephrotoxicity, infusion reactions, and appropriateness of therapy  BMP and CBC will be ordered per protocol  Plan for trough as patient approaches steady state, prior to the 4th  dose at approximately 1030 on 3/14  Due to infection severity, will target a trough of 15-20 (appropriate for most indications)   Pharmacy will continue to follow the patients culture results and clinical progress daily      Jeremy Tamayo, Pharmacist

## 2022-03-11 NOTE — ASSESSMENT & PLAN NOTE
Lab Results   Component Value Date    CREATININE 1 05 03/11/2022    CREATININE 1 20 03/10/2022    CREATININE 1 54 (H) 03/09/2022   POA   Baseline approximately 0 8  · Possibly in setting of limited oral intake and reported vomiting  · U/A: Benign  · D/c ivfs today and monitor off of fluids  · Monitor volume status and I/O's  · Measure PVR and bladder scan  · Limit/Avoid nephrotoxins: hold HCTZ  · Avoid hypotension and fluctuations in BP  · Continue to Monitor Renal function- repeat am bmp

## 2022-03-11 NOTE — ASSESSMENT & PLAN NOTE
· 1/2 bottles positive for: Gram + cocci in clusters   · Follow to end points  · Repeat cultures sent   · Start IV vanco- pharmacy consulted  · Consider ID pending cultures   · Trend procal-- 0 30; repeat level this am

## 2022-03-12 LAB
ANION GAP SERPL CALCULATED.3IONS-SCNC: 4 MMOL/L (ref 4–13)
BUN SERPL-MCNC: 12 MG/DL (ref 5–25)
CALCIUM SERPL-MCNC: 8.6 MG/DL (ref 8.3–10.1)
CHLORIDE SERPL-SCNC: 111 MMOL/L (ref 100–108)
CO2 SERPL-SCNC: 26 MMOL/L (ref 21–32)
CREAT SERPL-MCNC: 0.93 MG/DL (ref 0.6–1.3)
ERYTHROCYTE [DISTWIDTH] IN BLOOD BY AUTOMATED COUNT: 15.2 % (ref 11.6–15.1)
GFR SERPL CREATININE-BSD FRML MDRD: 61 ML/MIN/1.73SQ M
GLUCOSE SERPL-MCNC: 88 MG/DL (ref 65–140)
HCT VFR BLD AUTO: 34.1 % (ref 34.8–46.1)
HGB BLD-MCNC: 11 G/DL (ref 11.5–15.4)
MCH RBC QN AUTO: 27.2 PG (ref 26.8–34.3)
MCHC RBC AUTO-ENTMCNC: 32.3 G/DL (ref 31.4–37.4)
MCV RBC AUTO: 84 FL (ref 82–98)
PLATELET # BLD AUTO: 151 THOUSANDS/UL (ref 149–390)
PMV BLD AUTO: 12.2 FL (ref 8.9–12.7)
POTASSIUM SERPL-SCNC: 3.9 MMOL/L (ref 3.5–5.3)
PROCALCITONIN SERPL-MCNC: 0.19 NG/ML
RBC # BLD AUTO: 4.05 MILLION/UL (ref 3.81–5.12)
SODIUM SERPL-SCNC: 141 MMOL/L (ref 136–145)
WBC # BLD AUTO: 4.34 THOUSAND/UL (ref 4.31–10.16)

## 2022-03-12 PROCEDURE — 84145 PROCALCITONIN (PCT): CPT | Performed by: NURSE PRACTITIONER

## 2022-03-12 PROCEDURE — 80048 BASIC METABOLIC PNL TOTAL CA: CPT | Performed by: NURSE PRACTITIONER

## 2022-03-12 PROCEDURE — 85027 COMPLETE CBC AUTOMATED: CPT | Performed by: NURSE PRACTITIONER

## 2022-03-12 PROCEDURE — 99232 SBSQ HOSP IP/OBS MODERATE 35: CPT | Performed by: PHYSICIAN ASSISTANT

## 2022-03-12 RX ADMIN — HYDROXYCHLOROQUINE SULFATE 200 MG: 200 TABLET, FILM COATED ORAL at 11:40

## 2022-03-12 RX ADMIN — PANTOPRAZOLE SODIUM 40 MG: 40 TABLET, DELAYED RELEASE ORAL at 05:05

## 2022-03-12 RX ADMIN — BENZONATATE 100 MG: 100 CAPSULE ORAL at 11:38

## 2022-03-12 RX ADMIN — APIXABAN 5 MG: 5 TABLET, FILM COATED ORAL at 11:39

## 2022-03-12 RX ADMIN — METOPROLOL TARTRATE 25 MG: 25 TABLET, FILM COATED ORAL at 21:39

## 2022-03-12 RX ADMIN — PREDNISONE 5 MG: 5 TABLET ORAL at 11:38

## 2022-03-12 RX ADMIN — PREDNISONE 5 MG: 5 TABLET ORAL at 17:27

## 2022-03-12 RX ADMIN — HYDROXYCHLOROQUINE SULFATE 200 MG: 200 TABLET, FILM COATED ORAL at 17:27

## 2022-03-12 RX ADMIN — Medication: at 11:39

## 2022-03-12 RX ADMIN — LIDOCAINE 5% 1 PATCH: 700 PATCH TOPICAL at 11:39

## 2022-03-12 RX ADMIN — VANCOMYCIN HYDROCHLORIDE 1250 MG: 5 INJECTION, POWDER, LYOPHILIZED, FOR SOLUTION INTRAVENOUS at 11:40

## 2022-03-12 RX ADMIN — Medication: at 21:38

## 2022-03-12 RX ADMIN — ACETAMINOPHEN 650 MG: 325 TABLET ORAL at 21:38

## 2022-03-12 RX ADMIN — Medication 1000 UNITS: at 11:38

## 2022-03-12 RX ADMIN — APIXABAN 5 MG: 5 TABLET, FILM COATED ORAL at 17:27

## 2022-03-12 RX ADMIN — TRIAMCINOLONE ACETONIDE: 1 OINTMENT TOPICAL at 11:40

## 2022-03-12 RX ADMIN — TRIAMCINOLONE ACETONIDE: 1 OINTMENT TOPICAL at 17:27

## 2022-03-12 RX ADMIN — METOPROLOL TARTRATE 25 MG: 25 TABLET, FILM COATED ORAL at 11:38

## 2022-03-12 RX ADMIN — Medication: at 17:27

## 2022-03-12 RX ADMIN — GUAIFENESIN 600 MG: 600 TABLET, EXTENDED RELEASE ORAL at 11:38

## 2022-03-12 NOTE — ASSESSMENT & PLAN NOTE
· Cough with reported posttussive emesis for past 2 days, additionally reported fever at home to 101 5° F  Patient with overall generalized fatigue/weakness  · CXR 3/10- no evidence of acute cardiopulmonary disease   · Procalcitonin: 0 18-->0 3-->0 19  · COVID-19/influenza/RSV PCR: Negative  · BC x2: 1/2 bottles Staph coag negative    Likely contaminant, d/c IV Vanco and monitor off abx   · Repeat cultures sent and pending   · Supportive care otherwise in the interim  · PT/OT: recommending home with PT/OT/VNA

## 2022-03-12 NOTE — PROGRESS NOTES
[address] [facility]  Progress Note - Hung Escobar 1950, 66 y o  female MRN: [id number]  Unit/Bed#: CW2 213-02 Encounter: [id number]  Primary Care Provider: Kae Hernandez DO   Date and time admitted to hospital: 3/9/2022  4:30 PM    * Acute renal failure (ARF) [facility])  Assessment & Plan  Lab Results   Component Value Date    CREATININE 0 93 03/12/2022    CREATININE 1 05 03/11/2022    CREATININE 1 20 03/10/2022   POA  Baseline approximately 0 8  Cr on admission 1 5  · Possibly in setting of limited oral intake and reported vomiting  · U/A: Benign  · Now resolved with IV fluids and stable off   · Monitor volume status and I/O's  · Measure PVR and bladder scan  · Limit/Avoid nephrotoxins: hold HCTZ  · Avoid hypotension and fluctuations in BP  · Continue to Monitor Renal function- repeat am bmp     Psoriasis  Assessment & Plan  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  Could be responsible for fever, n/v   · Recommendations are as follows: Apply triamcinolone 0 1% ointment 2x/day to most bothersome areas on body (not face, groin areas) for 2 weeks  Apply prescription ointment before applying moisturizing ointment (eucerin)  Recommend applying liberal amount of eucerin ointment (or its hospital equivalent) to be applied to all affected areas 3-4x/day  This is should be done especially after shower (do not fully dry skin before applying eucerin ointment)  This should continue after discharge  · check a lipid panel, and if okay would start patient on low dose acitretin 10mg once daily for 2 weeks  This is non-formulary and would need to be ordered as an outpt   · Will need repeat CBC, CMP, lipid panel in 2 weeks  · F/u with Derm after discharge     Positive blood culture  Assessment & Plan  · 1/2 bottles positive for Staph coag negative  Likely contaminant    Monitor off IV abx   · Follow to end points  · Repeat cultures sent     Elevated troponin  Assessment & Plan  · Troponin 64->70-->68  · Patient without complaints of chest pain  · EKG without acute ischemic change  · Suspect demand ischemia secondary to acute renal failure and possible infectious etiology  · Monitor for development of cardiac symptoms    Paroxysmal atrial fibrillation (HCC)  Assessment & Plan  · Recent diagnosis at prior hospitalization  · Currently in sinus rhythm  · Continue Home medications:  · Metoprolol tartrate and A/C with Eliquis    Morbid obesity with BMI of 40 0-44 9, adult (HCC)  Assessment & Plan  · Encourage Lifestyle modifications    Benign essential hypertension  Assessment & Plan  · Continue Home medications:  · Metoprolol tartrate 25 mg b i d  With strict hold parameters  · Hold HCTZ in setting of acute renal failure  · Monitor blood pressure per protocol    Cough  Assessment & Plan  · Cough with reported posttussive emesis for past 2 days, additionally reported fever at home to 101 5° F  Patient with overall generalized fatigue/weakness  · CXR 3/10- no evidence of acute cardiopulmonary disease   · Procalcitonin: 0 18-->0 3-->0 19  · COVID-19/influenza/RSV PCR: Negative  · BC x2: 1/2 bottles Staph coag negative  Likely contaminant, d/c IV Vanco and monitor off abx   · Repeat cultures sent and pending   · Supportive care otherwise in the interim  · PT/OT: recommending home with PT/OT/VNA    Rheumatoid arthritis ([facility] )  Assessment & Plan  · Follows with Rheumatology (Dr Gilmar Contreras)  · Continue prednisone 5 mg b i d , Plaquenil  Patient stating she is no longer taking Humira        VTE Pharmacologic Prophylaxis: VTE Score: 6 High Risk (Score >/= 5) - Pharmacological DVT Prophylaxis Ordered: apixaban (Eliquis)  Sequential Compression Devices Ordered  Patient Centered Rounds: I performed bedside rounds with nursing staff today  Discussions with Specialists or Other Care Team Provider:     Education and Discussions with Family / Patient: Patient declined call to   Time Spent for Care: 30 minutes  More than 50% of total time spent on counseling and coordination of care as described above  Current Length of Stay: 3 day(s)  Current Patient Status: Inpatient   Certification Statement: The patient will continue to require additional inpatient hospital stay due to safe dc planning  Discharge Plan: Anticipate discharge in 24-48 hrs to home with home services  Code Status: Level 1 - Full Code    Subjective:   Pt reports she is feeling well today, happy to hear her kidneys are better  She states that she has concerns about doing stairs in her home  Objective:     Vitals:   Temp (24hrs), Av 5 °F (36 9 °C), Min:98 2 °F (36 8 °C), Max:98 8 °F (37 1 °C)    Temp:  [98 2 °F (36 8 °C)-98 8 °F (37 1 °C)] 98 4 °F (36 9 °C)  HR:  [71-89] 82  Resp:  [18-21] 21  BP: (118-131)/(52-69) 120/52  SpO2:  [97 %-99 %] 99 %  Body mass index is 44 03 kg/m²  Input and Output Summary (last 24 hours): Intake/Output Summary (Last 24 hours) at 3/12/2022 1119  Last data filed at 3/12/2022 0737  Gross per 24 hour   Intake 680 ml   Output 750 ml   Net -70 ml       Physical Exam:   Physical Exam  Vitals reviewed  Constitutional:       General: She is not in acute distress  Appearance: She is obese  She is not toxic-appearing  HENT:      Head: Normocephalic and atraumatic  Eyes:      Extraocular Movements: Extraocular movements intact  Cardiovascular:      Rate and Rhythm: Normal rate and regular rhythm  Pulmonary:      Effort: Pulmonary effort is normal       Breath sounds: Normal breath sounds  Abdominal:      General: Bowel sounds are normal  There is no distension  Palpations: Abdomen is soft  Tenderness: There is no abdominal tenderness  Musculoskeletal:         General: Normal range of motion  Cervical back: Normal range of motion  Skin:     Findings: Rash present  Neurological:      General: No focal deficit present        Mental Status: She is alert and oriented to person, place, and time  Psychiatric:         Mood and Affect: Mood normal          Behavior: Behavior normal           Additional Data:     Labs:  Results from last 7 days   Lab Units 03/12/22  0451 03/11/22  0428 03/11/22  0428   WBC Thousand/uL 4 34   < > 4 37   HEMOGLOBIN g/dL 11 0*   < > 10 8*   HEMATOCRIT % 34 1*   < > 35 2   PLATELETS Thousands/uL 151   < > 172   NEUTROS PCT %  --   --  67   LYMPHS PCT %  --   --  18   MONOS PCT %  --   --  9   EOS PCT %  --   --  5    < > = values in this interval not displayed  Results from last 7 days   Lab Units 03/12/22  0451 03/10/22  0615 03/09/22  1654   SODIUM mmol/L 141   < > 142   POTASSIUM mmol/L 3 9   < > 4 0   CHLORIDE mmol/L 111*   < > 110*   CO2 mmol/L 26   < > 22   BUN mg/dL 12   < > 9   CREATININE mg/dL 0 93   < > 1 54*   ANION GAP mmol/L 4   < > 10   CALCIUM mg/dL 8 6   < > 9 4   ALBUMIN g/dL  --   --  2 6*   TOTAL BILIRUBIN mg/dL  --   --  0 89   ALK PHOS U/L  --   --  52   ALT U/L  --   --  15   AST U/L  --   --  22   GLUCOSE RANDOM mg/dL 88   < > 109    < > = values in this interval not displayed  Results from last 7 days   Lab Units 03/09/22  1654   INR  1 53*             Results from last 7 days   Lab Units 03/12/22  0451 03/11/22  1233 03/10/22  0615 03/09/22  1929 03/09/22  1654   LACTIC ACID mmol/L  --   --   --  1 5 2 8*   PROCALCITONIN ng/ml 0 19 0 26* 0 30*  --  0 18       Lines/Drains:  Invasive Devices  Report    Peripheral Intravenous Line            Peripheral IV 03/09/22 Right; Lower Forearm 2 days          Drain            External Urinary Catheter <1 day                      Imaging: No pertinent imaging reviewed  Recent Cultures (last 7 days):   Results from last 7 days   Lab Units 03/11/22  1234 03/09/22  1716 03/09/22  1654   BLOOD CULTURE  Received in Microbiology Lab  Culture in Progress  Received in Microbiology Lab  Culture in Progress  No Growth at 48 hrs   Staphylococcus coagulase negative* GRAM STAIN RESULT   --   --  Gram positive cocci in clusters*       Last 24 Hours Medication List:   Current Facility-Administered Medications   Medication Dose Route Frequency Provider Last Rate    acetaminophen  650 mg Oral Q4H PRN Luigi Passe, DO      apixaban  5 mg Oral BID Luigi Passe, DO      benzonatate  100 mg Oral TID PRN Luigi Passe, DO      cholecalciferol  1,000 Units Oral Daily Luigi Passe, DO      guaiFENesin  600 mg Oral Q12H PRN Luigi Passe, DO      hydroxychloroquine  200 mg Oral BID Luigi Passe, DO      lidocaine  1 patch Topical Daily Luigi Passe, DO      metoprolol tartrate  25 mg Oral Q12H [address] Luigi Passe, DO      ondansetron  4 mg Intravenous Q6H PRN Luigi Passe, DO      pantoprazole  40 mg Oral Early Morning Luigi Passe, DO      predniSONE  5 mg Oral BID With Meals Luigi Passe, DO      triamcinolone   Topical BID AZIZA Meyer      white petrolatum-mineral oil   Topical TID AZIZA Meyer          Today, Patient Was Seen By: Gearld Gilford, PA-C    **Please Note: This note may have been constructed using a voice recognition system  **

## 2022-03-12 NOTE — PLAN OF CARE
Problem: Potential for Falls  Goal: Patient will remain free of falls  Description: INTERVENTIONS:  - Educate patient/family on patient safety including physical limitations  - Instruct patient to call for assistance with activity   - Consult OT/PT to assist with strengthening/mobility   - Keep Call bell within reach  - Keep bed low and locked with side rails adjusted as appropriate  - Keep care items and personal belongings within reach  - Initiate and maintain comfort rounds  - Make Fall Risk Sign visible to staff  - Offer Toileting every Hours, in advance of need  - Initiate/Maintain alarm  - Obtain necessary fall risk management equipment:   - Apply yellow socks and bracelet for high fall risk patients  - Consider moving patient to room near nurses station  Outcome: Progressing     Problem: MOBILITY - ADULT  Goal: Maintain or return to baseline ADL function  Description: INTERVENTIONS:  -  Assess patient's ability to carry out ADLs; assess patient's baseline for ADL function and identify physical deficits which impact ability to perform ADLs (bathing, care of mouth/teeth, toileting, grooming, dressing, etc )  - Assess/evaluate cause of self-care deficits   - Assess range of motion  - Assess patient's mobility; develop plan if impaired  - Assess patient's need for assistive devices and provide as appropriate  - Encourage maximum independence but intervene and supervise when necessary  - Involve family in performance of ADLs  - Assess for home care needs following discharge   - Consider OT consult to assist with ADL evaluation and planning for discharge  - Provide patient education as appropriate  Outcome: Progressing  Goal: Maintains/Returns to pre admission functional level  Description: INTERVENTIONS:  - Perform BMAT or MOVE assessment daily    - Set and communicate daily mobility goal to care team and patient/family/caregiver     - Collaborate with rehabilitation services on mobility goals if consulted  - Perform Range of Motion times a day  - Reposition patient every hours  - Dangle patient times a day  - Stand patient times a day  - Ambulate patient  times a day  - Out of bed to chair  times a day   - Out of bed for meals  times a day  - Out of bed for toileting  - Record patient progress and toleration of activity level   Outcome: Progressing     Problem: Prexisting or High Potential for Compromised Skin Integrity  Goal: Skin integrity is maintained or improved  Description: INTERVENTIONS:  - Identify patients at risk for skin breakdown  - Assess and monitor skin integrity  - Assess and monitor nutrition and hydration status  - Monitor labs   - Assess for incontinence   - Turn and reposition patient  - Assist with mobility/ambulation  - Relieve pressure over bony prominences  - Avoid friction and shearing  - Provide appropriate hygiene as needed including keeping skin clean and dry  - Evaluate need for skin moisturizer/barrier cream  - Collaborate with interdisciplinary team   - Patient/family teaching  - Consider wound care consult   Outcome: Progressing     Problem: Nutrition/Hydration-ADULT  Goal: Nutrient/Hydration intake appropriate for improving, restoring or maintaining nutritional needs  Description: Monitor and assess patient's nutrition/hydration status for malnutrition  Collaborate with interdisciplinary team and initiate plan and interventions as ordered  Monitor patient's weight and dietary intake as ordered or per policy  Utilize nutrition screening tool and intervene as necessary  Determine patient's food preferences and provide high-protein, high-caloric foods as appropriate       INTERVENTIONS:  - Monitor oral intake, urinary output, labs, and treatment plans  - Assess nutrition and hydration status and recommend course of action  - Evaluate amount of meals eaten  - Assist patient with eating if necessary   - Allow adequate time for meals  - Recommend/ encourage appropriate diets, oral nutritional supplements, and vitamin/mineral supplements  - Order, calculate, and assess calorie counts as needed  - Recommend, monitor, and adjust tube feedings and TPN/PPN based on assessed needs  - Assess need for intravenous fluids  - Provide specific nutrition/hydration education as appropriate  - Include patient/family/caregiver in decisions related to nutrition  Outcome: Progressing

## 2022-03-12 NOTE — ASSESSMENT & PLAN NOTE
· 1/2 bottles positive for Staph coag negative  Likely contaminant    Monitor off IV abx   · Follow to end points  · Repeat cultures sent

## 2022-03-12 NOTE — ASSESSMENT & PLAN NOTE
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  Could be responsible for fever, n/v   · Recommendations are as follows: Apply triamcinolone 0 1% ointment 2x/day to most bothersome areas on body (not face, groin areas) for 2 weeks  Apply prescription ointment before applying moisturizing ointment (eucerin)  Recommend applying liberal amount of eucerin ointment (or its hospital equivalent) to be applied to all affected areas 3-4x/day  This is should be done especially after shower (do not fully dry skin before applying eucerin ointment)  This should continue after discharge  · check a lipid panel, and if okay would start patient on low dose acitretin 10mg once daily for 2 weeks  This is non-formulary and would need to be ordered as an outpt   · Will need repeat CBC, CMP, lipid panel in 2 weeks     · F/u with Derm after discharge

## 2022-03-12 NOTE — PLAN OF CARE
Problem: Potential for Falls  Goal: Patient will remain free of falls  Description: INTERVENTIONS:  - Educate patient/family on patient safety including physical limitations  - Instruct patient to call for assistance with activity   - Consult OT/PT to assist with strengthening/mobility   - Keep Call bell within reach  - Keep bed low and locked with side rails adjusted as appropriate  - Keep care items and personal belongings within reach  - Initiate and maintain comfort rounds  - Make Fall Risk Sign visible to staff  - Offer Toileting every 2 Hours, in advance of need  - Initiate/Maintain alarm  - Obtain necessary fall risk management equipment  - Apply yellow socks and bracelet for high fall risk patients  - Consider moving patient to room near nurses station  Outcome: Progressing     Problem: MOBILITY - ADULT  Goal: Maintain or return to baseline ADL function  Description: INTERVENTIONS:  -  Assess patient's ability to carry out ADLs; assess patient's baseline for ADL function and identify physical deficits which impact ability to perform ADLs (bathing, care of mouth/teeth, toileting, grooming, dressing, etc )  - Assess/evaluate cause of self-care deficits   - Assess range of motion  - Assess patient's mobility; develop plan if impaired  - Assess patient's need for assistive devices and provide as appropriate  - Encourage maximum independence but intervene and supervise when necessary  - Involve family in performance of ADLs  - Assess for home care needs following discharge   - Consider OT consult to assist with ADL evaluation and planning for discharge  - Provide patient education as appropriate  Outcome: Progressing  Goal: Maintains/Returns to pre admission functional level  Description: INTERVENTIONS:  - Perform BMAT or MOVE assessment daily    - Set and communicate daily mobility goal to care team and patient/family/caregiver     - Collaborate with rehabilitation services on mobility goals if consulted  - Perform Range of Motion 3 times a day  - Reposition patient every 2 hours  - Dangle patient 3 times a day  - Stand patient 3 times a day  - Ambulate patient 3 times a day  - Out of bed to chair 3 times a day   - Out of bed for meals 3 times a day  - Out of bed for toileting  - Record patient progress and toleration of activity level   Outcome: Progressing     Problem: Prexisting or High Potential for Compromised Skin Integrity  Goal: Skin integrity is maintained or improved  Description: INTERVENTIONS:  - Identify patients at risk for skin breakdown  - Assess and monitor skin integrity  - Assess and monitor nutrition and hydration status  - Monitor labs   - Assess for incontinence   - Turn and reposition patient  - Assist with mobility/ambulation  - Relieve pressure over bony prominences  - Avoid friction and shearing  - Provide appropriate hygiene as needed including keeping skin clean and dry  - Evaluate need for skin moisturizer/barrier cream  - Collaborate with interdisciplinary team   - Patient/family teaching  - Consider wound care consult   Outcome: Progressing     Problem: Nutrition/Hydration-ADULT  Goal: Nutrient/Hydration intake appropriate for improving, restoring or maintaining nutritional needs  Description: Monitor and assess patient's nutrition/hydration status for malnutrition  Collaborate with interdisciplinary team and initiate plan and interventions as ordered  Monitor patient's weight and dietary intake as ordered or per policy  Utilize nutrition screening tool and intervene as necessary  Determine patient's food preferences and provide high-protein, high-caloric foods as appropriate       INTERVENTIONS:  - Monitor oral intake, urinary output, labs, and treatment plans  - Assess nutrition and hydration status and recommend course of action  - Evaluate amount of meals eaten  - Assist patient with eating if necessary   - Allow adequate time for meals  - Recommend/ encourage appropriate diets, oral nutritional supplements, and vitamin/mineral supplements  - Order, calculate, and assess calorie counts as needed  - Recommend, monitor, and adjust tube feedings and TPN/PPN based on assessed needs  - Assess need for intravenous fluids  - Provide specific nutrition/hydration education as appropriate  - Include patient/family/caregiver in decisions related to nutrition  Outcome: Progressing

## 2022-03-12 NOTE — ASSESSMENT & PLAN NOTE
· Follows with Rheumatology (Dr Ronel Rizvi)  · Continue prednisone 5 mg b i d , Plaquenil    Patient stating she is no longer taking Humira

## 2022-03-12 NOTE — PROGRESS NOTES
[address]  Waldo Florence is a 66 y o  F who was receiving Vancomycin IV with management by the Pharmacy Consult service  The patients Vancomycin therapy has been discontinued  Thank you for allowing us to take part in this patient's care  Pharmacy will sign-off now; please call or re-consult if there are any questions        Alina Lin PharmD  Emergency Medicine Clinical Pharmacist    or Marty

## 2022-03-12 NOTE — PLAN OF CARE
Problem: Prexisting or High Potential for Compromised Skin Integrity  Goal: Skin integrity is maintained or improved  Description: INTERVENTIONS:  - Identify patients at risk for skin breakdown  - Assess and monitor skin integrity  - Assess and monitor nutrition and hydration status  - Monitor labs   - Assess for incontinence   - Turn and reposition patient  - Assist with mobility/ambulation  - Relieve pressure over bony prominences  - Avoid friction and shearing  - Provide appropriate hygiene as needed including keeping skin clean and dry  - Evaluate need for skin moisturizer/barrier cream  - Collaborate with interdisciplinary team   - Patient/family teaching  - Consider wound care consult   Outcome: Progressing     Problem: Nutrition/Hydration-ADULT  Goal: Nutrient/Hydration intake appropriate for improving, restoring or maintaining nutritional needs  Description: Monitor and assess patient's nutrition/hydration status for malnutrition  Collaborate with interdisciplinary team and initiate plan and interventions as ordered  Monitor patient's weight and dietary intake as ordered or per policy  Utilize nutrition screening tool and intervene as necessary  Determine patient's food preferences and provide high-protein, high-caloric foods as appropriate       INTERVENTIONS:  - Monitor oral intake, urinary output, labs, and treatment plans  - Assess nutrition and hydration status and recommend course of action  - Evaluate amount of meals eaten  - Assist patient with eating if necessary   - Allow adequate time for meals  - Recommend/ encourage appropriate diets, oral nutritional supplements, and vitamin/mineral supplements  - Order, calculate, and assess calorie counts as needed  - Recommend, monitor, and adjust tube feedings and TPN/PPN based on assessed needs  - Assess need for intravenous fluids  - Provide specific nutrition/hydration education as appropriate  - Include patient/family/caregiver in decisions related to nutrition  Outcome: Progressing     Problem: PAIN - ADULT  Goal: Verbalizes/displays adequate comfort level or baseline comfort level  Description: Interventions:  - Encourage patient to monitor pain and request assistance  - Assess pain using appropriate pain scale  - Administer analgesics based on type and severity of pain and evaluate response  - Implement non-pharmacological measures as appropriate and evaluate response  - Consider cultural and social influences on pain and pain management  - Notify physician/advanced practitioner if interventions unsuccessful or patient reports new pain  Outcome: Progressing     Problem: INFECTION - ADULT  Goal: Absence or prevention of progression during hospitalization  Description: INTERVENTIONS:  - Assess and monitor for signs and symptoms of infection  - Monitor lab/diagnostic results  - Monitor all insertion sites, i e  indwelling lines, tubes, and drains  - Monitor endotracheal if appropriate and nasal secretions for changes in amount and color  - [place] appropriate cooling/warming therapies per order  - Administer medications as ordered  - Instruct and encourage patient and family to use good hand hygiene technique  - Identify and instruct in appropriate isolation precautions for identified infection/condition  Outcome: Progressing     Problem: DISCHARGE PLANNING  Goal: Discharge to home or other facility with appropriate resources  Description: INTERVENTIONS:  - Identify barriers to discharge w/patient and caregiver  - Arrange for needed discharge resources and transportation as appropriate  - Identify discharge learning needs (meds, wound care, etc )  - Arrange for interpretive services to assist at discharge as needed  - Refer to Case Management Department for coordinating discharge planning if the patient needs post-hospital services based on physician/advanced practitioner order or complex needs related to functional status, cognitive ability, or social support system  Outcome: Progressing     Problem: Knowledge Deficit  Goal: Patient/family/caregiver demonstrates understanding of disease process, treatment plan, medications, and discharge instructions  Description: Complete learning assessment and assess knowledge base    Interventions:  - Provide teaching at level of understanding  - Provide teaching via preferred learning methods  Outcome: Progressing

## 2022-03-12 NOTE — ASSESSMENT & PLAN NOTE
Lab Results   Component Value Date    CREATININE 0 93 03/12/2022    CREATININE 1 05 03/11/2022    CREATININE 1 20 03/10/2022   POA  Baseline approximately 0 8    Cr on admission 1 5  · Possibly in setting of limited oral intake and reported vomiting  · U/A: Benign  · Now resolved with IV fluids and stable off   · Monitor volume status and I/O's  · Measure PVR and bladder scan  · Limit/Avoid nephrotoxins: hold HCTZ  · Avoid hypotension and fluctuations in BP  · Continue to Monitor Renal function- repeat am bmp

## 2022-03-13 LAB
ANION GAP SERPL CALCULATED.3IONS-SCNC: 5 MMOL/L (ref 4–13)
BACTERIA BLD CULT: ABNORMAL
BASOPHILS # BLD AUTO: 0.02 THOUSANDS/ΜL (ref 0–0.1)
BASOPHILS NFR BLD AUTO: 0 % (ref 0–1)
BUN SERPL-MCNC: 14 MG/DL (ref 5–25)
CALCIUM SERPL-MCNC: 9 MG/DL (ref 8.3–10.1)
CHLORIDE SERPL-SCNC: 110 MMOL/L (ref 100–108)
CO2 SERPL-SCNC: 26 MMOL/L (ref 21–32)
CREAT SERPL-MCNC: 0.93 MG/DL (ref 0.6–1.3)
EOSINOPHIL # BLD AUTO: 0.18 THOUSAND/ΜL (ref 0–0.61)
EOSINOPHIL NFR BLD AUTO: 4 % (ref 0–6)
ERYTHROCYTE [DISTWIDTH] IN BLOOD BY AUTOMATED COUNT: 15 % (ref 11.6–15.1)
GFR SERPL CREATININE-BSD FRML MDRD: 61 ML/MIN/1.73SQ M
GLUCOSE SERPL-MCNC: 88 MG/DL (ref 65–140)
GRAM STN SPEC: ABNORMAL
HCT VFR BLD AUTO: 35.2 % (ref 34.8–46.1)
HGB BLD-MCNC: 11.2 G/DL (ref 11.5–15.4)
IMM GRANULOCYTES # BLD AUTO: 0.02 THOUSAND/UL (ref 0–0.2)
IMM GRANULOCYTES NFR BLD AUTO: 0 % (ref 0–2)
LYMPHOCYTES # BLD AUTO: 0.8 THOUSANDS/ΜL (ref 0.6–4.47)
LYMPHOCYTES NFR BLD AUTO: 17 % (ref 14–44)
MCH RBC QN AUTO: 27.1 PG (ref 26.8–34.3)
MCHC RBC AUTO-ENTMCNC: 31.8 G/DL (ref 31.4–37.4)
MCV RBC AUTO: 85 FL (ref 82–98)
MONOCYTES # BLD AUTO: 0.53 THOUSAND/ΜL (ref 0.17–1.22)
MONOCYTES NFR BLD AUTO: 11 % (ref 4–12)
NEUTROPHILS # BLD AUTO: 3.3 THOUSANDS/ΜL (ref 1.85–7.62)
NEUTS SEG NFR BLD AUTO: 68 % (ref 43–75)
NRBC BLD AUTO-RTO: 0 /100 WBCS
PLATELET # BLD AUTO: 176 THOUSANDS/UL (ref 149–390)
PMV BLD AUTO: 12.3 FL (ref 8.9–12.7)
POTASSIUM SERPL-SCNC: 4 MMOL/L (ref 3.5–5.3)
RBC # BLD AUTO: 4.14 MILLION/UL (ref 3.81–5.12)
SODIUM SERPL-SCNC: 141 MMOL/L (ref 136–145)
WBC # BLD AUTO: 4.85 THOUSAND/UL (ref 4.31–10.16)

## 2022-03-13 PROCEDURE — 85025 COMPLETE CBC W/AUTO DIFF WBC: CPT | Performed by: PHYSICIAN ASSISTANT

## 2022-03-13 PROCEDURE — 80048 BASIC METABOLIC PNL TOTAL CA: CPT | Performed by: PHYSICIAN ASSISTANT

## 2022-03-13 PROCEDURE — 99232 SBSQ HOSP IP/OBS MODERATE 35: CPT | Performed by: PHYSICIAN ASSISTANT

## 2022-03-13 RX ADMIN — GUAIFENESIN 600 MG: 600 TABLET, EXTENDED RELEASE ORAL at 11:18

## 2022-03-13 RX ADMIN — Medication: at 20:10

## 2022-03-13 RX ADMIN — METOPROLOL TARTRATE 25 MG: 25 TABLET, FILM COATED ORAL at 20:11

## 2022-03-13 RX ADMIN — Medication 1000 UNITS: at 11:18

## 2022-03-13 RX ADMIN — LIDOCAINE 5% 1 PATCH: 700 PATCH TOPICAL at 11:18

## 2022-03-13 RX ADMIN — HYDROXYCHLOROQUINE SULFATE 200 MG: 200 TABLET, FILM COATED ORAL at 12:13

## 2022-03-13 RX ADMIN — TRIAMCINOLONE ACETONIDE: 1 OINTMENT TOPICAL at 11:18

## 2022-03-13 RX ADMIN — APIXABAN 5 MG: 5 TABLET, FILM COATED ORAL at 18:22

## 2022-03-13 RX ADMIN — HYDROXYCHLOROQUINE SULFATE 200 MG: 200 TABLET, FILM COATED ORAL at 18:22

## 2022-03-13 RX ADMIN — PANTOPRAZOLE SODIUM 40 MG: 40 TABLET, DELAYED RELEASE ORAL at 05:15

## 2022-03-13 RX ADMIN — Medication: at 18:22

## 2022-03-13 RX ADMIN — Medication: at 11:18

## 2022-03-13 RX ADMIN — APIXABAN 5 MG: 5 TABLET, FILM COATED ORAL at 11:18

## 2022-03-13 RX ADMIN — ACETAMINOPHEN 650 MG: 325 TABLET ORAL at 20:10

## 2022-03-13 RX ADMIN — METOPROLOL TARTRATE 25 MG: 25 TABLET, FILM COATED ORAL at 11:18

## 2022-03-13 RX ADMIN — PREDNISONE 5 MG: 5 TABLET ORAL at 06:39

## 2022-03-13 RX ADMIN — TRIAMCINOLONE ACETONIDE: 1 OINTMENT TOPICAL at 18:22

## 2022-03-13 RX ADMIN — PREDNISONE 5 MG: 5 TABLET ORAL at 18:22

## 2022-03-13 NOTE — ASSESSMENT & PLAN NOTE
Lab Results   Component Value Date    CREATININE 0 93 03/13/2022    CREATININE 0 93 03/12/2022    CREATININE 1 05 03/11/2022   POA  Baseline approximately 0 8    Cr on admission 1 5  · Possibly in setting of limited oral intake and reported vomiting  · U/A: Benign  · Now resolved with IV fluids and stable off   · Monitor volume status and I/O's  · Measure PVR and bladder scan  · Limit/Avoid nephrotoxins: hold HCTZ  · Avoid hypotension and fluctuations in BP  · Continue to Monitor Renal function- repeat am bmp

## 2022-03-13 NOTE — ASSESSMENT & PLAN NOTE
· Follows with Rheumatology (Dr Johnie Fothergill)  · Continue prednisone 5 mg b i d , Plaquenil    Patient stating she is no longer taking Humira

## 2022-03-13 NOTE — ASSESSMENT & PLAN NOTE
· Cough with reported posttussive emesis for past 2 days, additionally reported fever at home to 101 5° F  Patient with overall generalized fatigue/weakness  · CXR 3/10- no evidence of acute cardiopulmonary disease   · Procalcitonin: 0 18-->0 30-->0 19  · COVID-19/influenza/RSV PCR: Negative  · BC x2: 1/2 bottles Staph coag negative    Likely contaminant, d/c IV Vanco and monitor off abx   · Repeat cultures negative at 24 hrs   · Supportive care otherwise in the interim  · PT/OT: recommending home with PT/OT/VNA

## 2022-03-13 NOTE — PROGRESS NOTES
[address] [facility]  Progress Note - Emir How 1950, 66 y o  female MRN: [id number]  Unit/Bed#: CW2 213-02 Encounter: [id number]  Primary Care Provider: Andreas Solomon DO   Date and time admitted to hospital: 3/9/2022  4:30 PM    * Acute renal failure (ARF) [facility])  Assessment & Plan  Lab Results   Component Value Date    CREATININE 0 93 03/13/2022    CREATININE 0 93 03/12/2022    CREATININE 1 05 03/11/2022   POA  Baseline approximately 0 8  Cr on admission 1 5  · Possibly in setting of limited oral intake and reported vomiting  · U/A: Benign  · Now resolved with IV fluids and stable off   · Monitor volume status and I/O's  · Measure PVR and bladder scan  · Limit/Avoid nephrotoxins: hold HCTZ  · Avoid hypotension and fluctuations in BP  · Continue to Monitor Renal function- repeat am bmp     Psoriasis  Assessment & Plan  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  Could be responsible for fever, n/v   · Recommendations are as follows: Apply triamcinolone 0 1% ointment 2x/day to most bothersome areas on body (not face, groin areas) for 2 weeks  Apply prescription ointment before applying moisturizing ointment (eucerin)  Recommend applying liberal amount of eucerin ointment (or its hospital equivalent) to be applied to all affected areas 3-4x/day  This is should be done especially after shower (do not fully dry skin before applying eucerin ointment)  This should continue after discharge  · check a lipid panel, and if okay would start patient on low dose acitretin 10mg once daily for 2 weeks  This is non-formulary and would need to be ordered as an outpt   · Will need repeat CBC, CMP, lipid panel in 2 weeks  · F/u with Derm after discharge     Positive blood culture  Assessment & Plan  · 1/2 bottles positive for Staph coag negative  Likely contaminant    Monitor off IV abx   · Follow to end points  · Repeat cultures negative at 24 hrs    Elevated troponin  Assessment & Plan  · Troponin 64->70-->68  · Patient without complaints of chest pain  · EKG without acute ischemic change  · Suspect demand ischemia secondary to acute renal failure and possible infectious etiology  · Monitor for development of cardiac symptoms    Paroxysmal atrial fibrillation (HCC)  Assessment & Plan  · Recent diagnosis at prior hospitalization  · Currently in sinus rhythm  · Continue Home medications:  · Metoprolol tartrate and A/C with Eliquis    Morbid obesity with BMI of 40 0-44 9, adult (HCC)  Assessment & Plan  · Encourage Lifestyle modifications    Benign essential hypertension  Assessment & Plan  · Continue Home medications:  · Metoprolol tartrate 25 mg b i d  With strict hold parameters  · Hold HCTZ in setting of acute renal failure  · Monitor blood pressure per protocol    Cough  Assessment & Plan  · Cough with reported posttussive emesis for past 2 days, additionally reported fever at home to 101 5° F  Patient with overall generalized fatigue/weakness  · CXR 3/10- no evidence of acute cardiopulmonary disease   · Procalcitonin: 0 18-->0 30-->0 19  · COVID-19/influenza/RSV PCR: Negative  · BC x2: 1/2 bottles Staph coag negative  Likely contaminant, d/c IV Vanco and monitor off abx   · Repeat cultures negative at 24 hrs   · Supportive care otherwise in the interim  · PT/OT: recommending home with PT/OT/VNA    Rheumatoid arthritis ([facility] [address] )  Assessment & Plan  · Follows with Rheumatology (Dr Zambrano )  · Continue prednisone 5 mg b i d , Plaquenil  Patient stating she is no longer taking Humira          VTE Pharmacologic Prophylaxis: VTE Score: 6 High Risk (Score >/= 5) - Pharmacological DVT Prophylaxis Ordered: apixaban (Eliquis)  Sequential Compression Devices Ordered  Patient Centered Rounds: I performed bedside rounds with nursing staff today    Discussions with Specialists or Other Care Team Provider: RN    Education and Discussions with Family / Patient: Patient declined call to   Time Spent for Care: 20 minutes  More than 50% of total time spent on counseling and coordination of care as described above  Current Length of Stay: 4 day(s)  Current Patient Status: Inpatient   Certification Statement: The patient will continue to require additional inpatient hospital stay due to safe dc planning for pt reeval for steps  Discharge Plan: Anticipate discharge tomorrow to discharge location to be determined pending rehab evaluations  Code Status: Level 1 - Full Code    Subjective:   Pt has no acute complaints today, feels well, worried about doing stairs at home     Objective:     Vitals:   Temp (24hrs), Av 4 °F (36 9 °C), Min:98 2 °F (36 8 °C), Max:98 6 °F (37 °C)    Temp:  [98 2 °F (36 8 °C)-98 6 °F (37 °C)] 98 6 °F (37 °C)  HR:  [71-79] 71  Resp:  [19] 19  BP: (121-134)/(54-66) 134/66  SpO2:  [98 %-99 %] 99 %  Body mass index is 44 13 kg/m²  Input and Output Summary (last 24 hours): Intake/Output Summary (Last 24 hours) at 3/13/2022 1606  Last data filed at 3/13/2022 1200  Gross per 24 hour   Intake 830 ml   Output 1750 ml   Net -920 ml       Physical Exam:   Physical Exam  Vitals reviewed  Constitutional:       General: She is not in acute distress  Appearance: She is obese  She is not toxic-appearing  HENT:      Head: Normocephalic and atraumatic  Eyes:      Extraocular Movements: Extraocular movements intact  Cardiovascular:      Rate and Rhythm: Normal rate and regular rhythm  Pulmonary:      Effort: Pulmonary effort is normal  No respiratory distress  Breath sounds: Normal breath sounds  Abdominal:      General: Bowel sounds are normal  There is no distension  Palpations: Abdomen is soft  Tenderness: There is no abdominal tenderness  Musculoskeletal:         General: Normal range of motion  Cervical back: Normal range of motion  Skin:     Findings: Rash present     Neurological:      General: No focal deficit present  Mental Status: She is alert and oriented to person, place, and time  Psychiatric:         Mood and Affect: Mood normal          Behavior: Behavior normal          Thought Content: Thought content normal           Additional Data:     Labs:  Results from last 7 days   Lab Units 03/13/22  0509   WBC Thousand/uL 4 85   HEMOGLOBIN g/dL 11 2*   HEMATOCRIT % 35 2   PLATELETS Thousands/uL 176   NEUTROS PCT % 68   LYMPHS PCT % 17   MONOS PCT % 11   EOS PCT % 4     Results from last 7 days   Lab Units 03/13/22  0509 03/10/22  0615 03/09/22  1654   SODIUM mmol/L 141   < > 142   POTASSIUM mmol/L 4 0   < > 4 0   CHLORIDE mmol/L 110*   < > 110*   CO2 mmol/L 26   < > 22   BUN mg/dL 14   < > 9   CREATININE mg/dL 0 93   < > 1 54*   ANION GAP mmol/L 5   < > 10   CALCIUM mg/dL 9 0   < > 9 4   ALBUMIN g/dL  --   --  2 6*   TOTAL BILIRUBIN mg/dL  --   --  0 89   ALK PHOS U/L  --   --  52   ALT U/L  --   --  15   AST U/L  --   --  22   GLUCOSE RANDOM mg/dL 88   < > 109    < > = values in this interval not displayed  Results from last 7 days   Lab Units 03/09/22  1654   INR  1 53*             Results from last 7 days   Lab Units 03/12/22  0451 03/11/22  1233 03/10/22  0615 03/09/22  1929 03/09/22  1654   LACTIC ACID mmol/L  --   --   --  1 5 2 8*   PROCALCITONIN ng/ml 0 19 0 26* 0 30*  --  0 18       Lines/Drains:  Invasive Devices  Report    Peripheral Intravenous Line            Peripheral IV 03/12/22 Right;Ventral (anterior) Forearm 1 day          Drain            External Urinary Catheter 1 day                  Imaging: No pertinent imaging reviewed  Recent Cultures (last 7 days):   Results from last 7 days   Lab Units 03/11/22  1234 03/09/22  1716 03/09/22  1654   BLOOD CULTURE  No Growth at 24 hrs  No Growth at 24 hrs  No Growth at 72 hrs   Staphylococcus coagulase negative*   GRAM STAIN RESULT   --   --  Gram positive cocci in clusters*       Last 24 Hours Medication List:   Current Facility-Administered Medications   Medication Dose Route Frequency Provider Last Rate    acetaminophen  650 mg Oral Q4H PRN Ivar Stoney, DO      apixaban  5 mg Oral BID Ivar Stoney, DO      benzonatate  100 mg Oral TID PRN Ivar Stoney, DO      cholecalciferol  1,000 Units Oral Daily Ivar Stoney, DO      guaiFENesin  600 mg Oral Q12H PRN Ivar Stoney, DO      hydroxychloroquine  200 mg Oral BID Ivar Stoney, DO      lidocaine  1 patch Topical Daily Ivar Stoney, DO      metoprolol tartrate  25 mg Oral Q12H [address] Ivar Stoney, DO      ondansetron  4 mg Intravenous Q6H PRN Ivar Stoney, DO      pantoprazole  40 mg Oral Early Morning Ivar Stoney, DO      predniSONE  5 mg Oral BID With Meals Ivar Stoney, DO      triamcinolone   Topical BID AZIZA Jain      white petrolatum-mineral oil   Topical TID AZIZA Jain          Today, Patient Was Seen By: Andrzej Ambrosio PA-C    **Please Note: This note may have been constructed using a voice recognition system  **

## 2022-03-13 NOTE — ASSESSMENT & PLAN NOTE
· 1/2 bottles positive for Staph coag negative  Likely contaminant    Monitor off IV abx   · Follow to end points  · Repeat cultures negative at 24 hrs

## 2022-03-13 NOTE — PLAN OF CARE
Problem: MOBILITY - ADULT  Goal: Maintain or return to baseline ADL function  Description: INTERVENTIONS:  -  Assess patient's ability to carry out ADLs; assess patient's baseline for ADL function and identify physical deficits which impact ability to perform ADLs (bathing, care of mouth/teeth, toileting, grooming, dressing, etc )  - Assess/evaluate cause of self-care deficits   - Assess range of motion  - Assess patient's mobility; develop plan if impaired  - Assess patient's need for assistive devices and provide as appropriate  - Encourage maximum independence but intervene and supervise when necessary  - Involve family in performance of ADLs  - Assess for home care needs following discharge   - Consider OT consult to assist with ADL evaluation and planning for discharge  - Provide patient education as appropriate  Outcome: Progressing     Problem: Prexisting or High Potential for Compromised Skin Integrity  Goal: Skin integrity is maintained or improved  Description: INTERVENTIONS:  - Identify patients at risk for skin breakdown  - Assess and monitor skin integrity  - Assess and monitor nutrition and hydration status  - Monitor labs   - Assess for incontinence   - Turn and reposition patient  - Assist with mobility/ambulation  - Relieve pressure over bony prominences  - Avoid friction and shearing  - Provide appropriate hygiene as needed including keeping skin clean and dry  - Evaluate need for skin moisturizer/barrier cream  - Collaborate with interdisciplinary team   - Patient/family teaching  - Consider wound care consult   Outcome: Progressing     Problem: Nutrition/Hydration-ADULT  Goal: Nutrient/Hydration intake appropriate for improving, restoring or maintaining nutritional needs  Description: Monitor and assess patient's nutrition/hydration status for malnutrition  Collaborate with interdisciplinary team and initiate plan and interventions as ordered    Monitor patient's weight and dietary intake as ordered or per policy  Utilize nutrition screening tool and intervene as necessary  Determine patient's food preferences and provide high-protein, high-caloric foods as appropriate  INTERVENTIONS:  - Monitor oral intake, urinary output, labs, and treatment plans  - Assess nutrition and hydration status and recommend course of action  - Evaluate amount of meals eaten  - Assist patient with eating if necessary   - Allow adequate time for meals  - Recommend/ encourage appropriate diets, oral nutritional supplements, and vitamin/mineral supplements  - Order, calculate, and assess calorie counts as needed  - Recommend, monitor, and adjust tube feedings and TPN/PPN based on assessed needs  - Assess need for intravenous fluids  - Provide specific nutrition/hydration education as appropriate  - Include patient/family/caregiver in decisions related to nutrition  Outcome: Progressing     Problem: Knowledge Deficit  Goal: Patient/family/caregiver demonstrates understanding of disease process, treatment plan, medications, and discharge instructions  Description: Complete learning assessment and assess knowledge base    Interventions:  - Provide teaching at level of understanding  - Provide teaching via preferred learning methods  Outcome: Progressing

## 2022-03-14 LAB
ANION GAP SERPL CALCULATED.3IONS-SCNC: 3 MMOL/L (ref 4–13)
BUN SERPL-MCNC: 13 MG/DL (ref 5–25)
CALCIUM SERPL-MCNC: 8.9 MG/DL (ref 8.3–10.1)
CHLORIDE SERPL-SCNC: 110 MMOL/L (ref 100–108)
CO2 SERPL-SCNC: 28 MMOL/L (ref 21–32)
CREAT SERPL-MCNC: 0.92 MG/DL (ref 0.6–1.3)
GFR SERPL CREATININE-BSD FRML MDRD: 61 ML/MIN/1.73SQ M
GLUCOSE SERPL-MCNC: 83 MG/DL (ref 65–140)
POTASSIUM SERPL-SCNC: 3.9 MMOL/L (ref 3.5–5.3)
SODIUM SERPL-SCNC: 141 MMOL/L (ref 136–145)

## 2022-03-14 PROCEDURE — 97116 GAIT TRAINING THERAPY: CPT

## 2022-03-14 PROCEDURE — 97166 OT EVAL MOD COMPLEX 45 MIN: CPT

## 2022-03-14 PROCEDURE — 80048 BASIC METABOLIC PNL TOTAL CA: CPT | Performed by: PHYSICIAN ASSISTANT

## 2022-03-14 PROCEDURE — 99232 SBSQ HOSP IP/OBS MODERATE 35: CPT | Performed by: PHYSICIAN ASSISTANT

## 2022-03-14 RX ADMIN — TRIAMCINOLONE ACETONIDE: 1 OINTMENT TOPICAL at 17:18

## 2022-03-14 RX ADMIN — LIDOCAINE 5% 1 PATCH: 700 PATCH TOPICAL at 08:52

## 2022-03-14 RX ADMIN — Medication 1000 UNITS: at 08:51

## 2022-03-14 RX ADMIN — PREDNISONE 5 MG: 5 TABLET ORAL at 17:54

## 2022-03-14 RX ADMIN — Medication: at 17:17

## 2022-03-14 RX ADMIN — ACETAMINOPHEN 650 MG: 325 TABLET ORAL at 22:38

## 2022-03-14 RX ADMIN — Medication: at 10:14

## 2022-03-14 RX ADMIN — PANTOPRAZOLE SODIUM 40 MG: 40 TABLET, DELAYED RELEASE ORAL at 05:44

## 2022-03-14 RX ADMIN — METOPROLOL TARTRATE 25 MG: 25 TABLET, FILM COATED ORAL at 08:51

## 2022-03-14 RX ADMIN — BENZONATATE 100 MG: 100 CAPSULE ORAL at 01:47

## 2022-03-14 RX ADMIN — HYDROXYCHLOROQUINE SULFATE 200 MG: 200 TABLET, FILM COATED ORAL at 08:51

## 2022-03-14 RX ADMIN — TRIAMCINOLONE ACETONIDE: 1 OINTMENT TOPICAL at 10:14

## 2022-03-14 RX ADMIN — APIXABAN 5 MG: 5 TABLET, FILM COATED ORAL at 17:54

## 2022-03-14 RX ADMIN — PREDNISONE 5 MG: 5 TABLET ORAL at 05:44

## 2022-03-14 RX ADMIN — APIXABAN 5 MG: 5 TABLET, FILM COATED ORAL at 08:51

## 2022-03-14 RX ADMIN — HYDROXYCHLOROQUINE SULFATE 200 MG: 200 TABLET, FILM COATED ORAL at 17:54

## 2022-03-14 RX ADMIN — METOPROLOL TARTRATE 25 MG: 25 TABLET, FILM COATED ORAL at 21:56

## 2022-03-14 RX ADMIN — GUAIFENESIN 600 MG: 600 TABLET, EXTENDED RELEASE ORAL at 01:47

## 2022-03-14 NOTE — PROGRESS NOTES
[address] [facility]  Progress Note - Henry Drafts 1950, 66 y o  female MRN: [id number]  Unit/Bed#: CW2 213-02 Encounter: [id number]  Primary Care Provider: Christine Santamaria DO   Date and time admitted to hospital: 3/9/2022  4:30 PM    * Acute renal failure (ARF) [facility])  Assessment & Plan  Lab Results   Component Value Date    CREATININE 0 92 03/14/2022    CREATININE 0 93 03/13/2022    CREATININE 0 93 03/12/2022   POA  Baseline approximately 0 8  Cr on admission 1 5  · Possibly in setting of limited oral intake and reported vomiting  · U/A: Benign  · Now resolved with IV fluids and stable off   · Monitor volume status and I/O's  · Measure PVR and bladder scan  · Limit/Avoid nephrotoxins: hold HCTZ  · Avoid hypotension and fluctuations in BP  · Continue to Monitor Renal function- repeat am bmp     Psoriasis  Assessment & Plan  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  Could be responsible for fever, n/v   · Recommendations are as follows: Apply triamcinolone 0 1% ointment 2x/day to most bothersome areas on body (not face, groin areas) for 2 weeks  Apply prescription ointment before applying moisturizing ointment (eucerin)  Recommend applying liberal amount of eucerin ointment (or its hospital equivalent) to be applied to all affected areas 3-4x/day  This is should be done especially after shower (do not fully dry skin before applying eucerin ointment)  This should continue after discharge  · check a lipid panel, and if okay would start patient on low dose acitretin 10mg once daily for 2 weeks  This is non-formulary and would need to be ordered as an outpt   · Will need repeat CBC, CMP, lipid panel in 2 weeks  · F/u with Derm after discharge     Positive blood culture  Assessment & Plan  · 1/2 bottles positive for Staph coag negative  Likely contaminant    Monitor off IV abx   · Repeat cultures negative at 48 hrs    Elevated troponin  Assessment & Plan  · Troponin 64->70-->68  · Patient without complaints of chest pain  · EKG without acute ischemic change  · Suspect demand ischemia secondary to acute renal failure and possible infectious etiology  · Monitor for development of cardiac symptoms    Paroxysmal atrial fibrillation (HCC)  Assessment & Plan  · Recent diagnosis at prior hospitalization  · Currently in sinus rhythm  · Continue Home medications:  · Metoprolol tartrate and A/C with Eliquis    Morbid obesity with BMI of 40 0-44 9, adult (HCC)  Assessment & Plan  · Encourage Lifestyle modifications    Benign essential hypertension  Assessment & Plan  · Continue Home medications:  · Metoprolol tartrate 25 mg b i d  With strict hold parameters  · Hold HCTZ in setting of acute renal failure  · Monitor blood pressure per protocol    Cough  Assessment & Plan  · Cough with reported posttussive emesis for past 2 days, additionally reported fever at home to 101 5° F  Patient with overall generalized fatigue/weakness  · CXR 3/10- no evidence of acute cardiopulmonary disease   · Procalcitonin: 0 18-->0 30-->0 19  · COVID-19/influenza/RSV PCR: Negative  · BC x2: 1/2 bottles Staph coag negative  Likely contaminant, d/c IV Vanco and monitor off abx   · Repeat cultures negative at 24 hrs   · Supportive care otherwise in the interim  · PT/OT: recommending home with PT/OT/VNA          VTE Pharmacologic Prophylaxis: VTE Score: 6 High Risk (Score >/= 5) - Pharmacological DVT Prophylaxis Ordered: apixaban (Eliquis)  Sequential Compression Devices Ordered  Patient Centered Rounds: I performed bedside rounds with nursing staff today  Discussions with Specialists or Other Care Team Provider: RNSHARRI     Education and Discussions with Family / Patient: Attempted to update  (daughter) via phone  Unable to contact  Time Spent for Care: 30 minutes  More than 50% of total time spent on counseling and coordination of care as described above      Current Length of Stay: 5 day(s)  Current Patient Status: Inpatient   Certification Statement: The patient will continue to require additional inpatient hospital stay due to pending PT re-eval, needs transport  Discharge Plan: Anticipate discharge later today or tomorrow to discharge location to be determined pending rehab evaluations  Code Status: Level 1 - Full Code    Subjective:   Pt has no acute complaints, feels well  Wants clothes to go home  Objective:     Vitals:   Temp (24hrs), Av 4 °F (36 9 °C), Min:98 °F (36 7 °C), Max:98 6 °F (37 °C)    Temp:  [98 °F (36 7 °C)-98 6 °F (37 °C)] 98 °F (36 7 °C)  HR:  [68-72] 72  Resp:  [17-19] 18  BP: (114-137)/(54-66) 137/65  SpO2:  [97 %-99 %] 98 %  Body mass index is 44 3 kg/m²  Input and Output Summary (last 24 hours): Intake/Output Summary (Last 24 hours) at 3/14/2022 1032  Last data filed at 3/14/2022 0701  Gross per 24 hour   Intake 760 ml   Output 1550 ml   Net -790 ml       Physical Exam:   Physical Exam  Vitals reviewed  Constitutional:       General: She is not in acute distress  Appearance: She is obese  She is not toxic-appearing  HENT:      Head: Normocephalic and atraumatic  Eyes:      General: No scleral icterus  Extraocular Movements: Extraocular movements intact  Cardiovascular:      Rate and Rhythm: Normal rate and regular rhythm  Pulmonary:      Effort: Pulmonary effort is normal       Breath sounds: Normal breath sounds  Abdominal:      General: Bowel sounds are normal  There is no distension  Palpations: Abdomen is soft  Tenderness: There is no abdominal tenderness  Musculoskeletal:         General: Normal range of motion  Cervical back: Normal range of motion  Skin:     Findings: Rash present  Neurological:      General: No focal deficit present  Mental Status: She is alert and oriented to person, place, and time     Psychiatric:         Mood and Affect: Mood normal          Behavior: Behavior normal          Thought Content: Thought content normal          Judgment: Judgment normal           Additional Data:     Labs:  Results from last 7 days   Lab Units 03/13/22  0509   WBC Thousand/uL 4 85   HEMOGLOBIN g/dL 11 2*   HEMATOCRIT % 35 2   PLATELETS Thousands/uL 176   NEUTROS PCT % 68   LYMPHS PCT % 17   MONOS PCT % 11   EOS PCT % 4     Results from last 7 days   Lab Units 03/14/22  0509 03/10/22  0615 03/09/22  1654   SODIUM mmol/L 141   < > 142   POTASSIUM mmol/L 3 9   < > 4 0   CHLORIDE mmol/L 110*   < > 110*   CO2 mmol/L 28   < > 22   BUN mg/dL 13   < > 9   CREATININE mg/dL 0 92   < > 1 54*   ANION GAP mmol/L 3*   < > 10   CALCIUM mg/dL 8 9   < > 9 4   ALBUMIN g/dL  --   --  2 6*   TOTAL BILIRUBIN mg/dL  --   --  0 89   ALK PHOS U/L  --   --  52   ALT U/L  --   --  15   AST U/L  --   --  22   GLUCOSE RANDOM mg/dL 83   < > 109    < > = values in this interval not displayed  Results from last 7 days   Lab Units 03/09/22  1654   INR  1 53*             Results from last 7 days   Lab Units 03/12/22  0451 03/11/22  1233 03/10/22  0615 03/09/22  1929 03/09/22  1654   LACTIC ACID mmol/L  --   --   --  1 5 2 8*   PROCALCITONIN ng/ml 0 19 0 26* 0 30*  --  0 18       Lines/Drains:  Invasive Devices  Report    Peripheral Intravenous Line            Peripheral IV 03/12/22 Right;Ventral (anterior) Forearm 1 day          Drain            External Urinary Catheter 2 days                      Imaging: No pertinent imaging reviewed  Recent Cultures (last 7 days):   Results from last 7 days   Lab Units 03/11/22  1234 03/09/22  1716 03/09/22  1654   BLOOD CULTURE  No Growth at 48 hrs  No Growth at 48 hrs  No Growth After 4 Days   Staphylococcus coagulase negative*   GRAM STAIN RESULT   --   --  Gram positive cocci in clusters*       Last 24 Hours Medication List:   Current Facility-Administered Medications   Medication Dose Route Frequency Provider Last Rate    acetaminophen  650 mg Oral Q4H PRN Kedar Brannon DO      apixaban  5 mg Oral BID Hoy Burkett, DO      benzonatate  100 mg Oral TID PRN Hoy Burkett, DO      cholecalciferol  1,000 Units Oral Daily Hoy Burkett, DO      guaiFENesin  600 mg Oral Q12H PRN Hoy Burkett, DO      hydroxychloroquine  200 mg Oral BID Hoy Burkett, DO      lidocaine  1 patch Topical Daily Hoy Burkett, DO      metoprolol tartrate  25 mg Oral Q12H [address] Hoy Burkett, DO      ondansetron  4 mg Intravenous Q6H PRN Hoy Burkett, DO      pantoprazole  40 mg Oral Early Morning Hoy Burkett, DO      predniSONE  5 mg Oral BID With Meals Hoy Burkett, DO      triamcinolone   Topical BID TYSHAWN AguileraNP      white petrolatum-mineral oil   Topical TID TYSHAWN AguileraNP          Today, Patient Was Seen By: [facility] DEL[facility]IN [facility]ER[facility]OD PA-C    **Please Note: This note may have been constructed using a voice recognition system  **

## 2022-03-14 NOTE — ASSESSMENT & PLAN NOTE
Lab Results   Component Value Date    CREATININE 0 92 03/14/2022    CREATININE 0 93 03/13/2022    CREATININE 0 93 03/12/2022     POA  Baseline approximately 0 8    Cr on admission 1 5  · Likely in setting of limited oral intake and reported vomiting  · U/A: Benign  · Now resolved with IV fluids and stable off   · Limit/Avoid nephrotoxins: HCTZ was held during admission, can resume on discharge  · Avoid hypotension and fluctuations in BP  · Continue to Monitor Renal function- repeat bmp in 1-2 weeks as an outpt

## 2022-03-14 NOTE — OCCUPATIONAL THERAPY NOTE
Occupational Therapy Evaluation     Patient Name: Alyson Jeter  YZMOT'K Date: 3/14/2022  Problem List  Principal Problem:    Acute renal failure (ARF) ([facility] )  Active Problems:    Rheumatoid arthritis ([facility] )    Cough    Benign essential hypertension    Morbid obesity with BMI of 40 0-44 9, adult (HCC)    Paroxysmal atrial fibrillation (HCC)    Elevated troponin    Positive blood culture    Psoriasis    Past Medical History  Past Medical History:   Diagnosis Date    Abnormal thyroid function test     last assessed: 2015     Arthritis     Caries     last assessed: 2016     Edema of right lower extremity     last assessed: 2015     GERD (gastroesophageal reflux disease)     Hypertension     Sarcoid      Past Surgical History  Past Surgical History:   Procedure Laterality Date     SECTION      MULTIPLE TOOTH EXTRACTIONS N/A 2016    Procedure: Surgical extraction of teeth 2, 18, 19, 30, 31; incision and drainage of left subperiosteal abscess ;  Surgeon: Ashlyn Greenwood DMD;  Location: [facility];  Service:         22 1055   OT Last Visit   OT Visit Date 22   Note Type   Note type Evaluation   Restrictions/Precautions   Weight Bearing Precautions Per Order No   Other Precautions Pain; Fall Risk   Pain Assessment   Pain Assessment Tool 0-10   Pain Score 10 - Worst Possible Pain   Pain Location/Orientation Orientation: Right;Location: Leg   Hospital Pain Intervention(s) Repositioned; Ambulation/increased activity   Home Living   Type of [address] [place] [address] Two level;Stairs to enter with rails  (1 CHHAYA; pt reports she only stays on the second floor)   [address] Walker;Cane   Additional Comments Pt reports using SPC inside the house and RW for community distances   Prior Function   Level of [place] Independent with ADLs and functional mobility   Lives With Daughter;Family   Receives Help From Family   ADL Assistance Independent   IADLs Independent   Falls in the last 6 months 1 to 4  (Pt reports 1 falls)   Vocational Retired   Comments Pt reports living with supportive daughter and granddaughter who can assist prn   Lifestyle   Autonomy Independent in ADLs, IADLs, and functional mobility pta   Reciprocal Relationships Supportive family   Service to Others Retired   [address] Enjoys watching TV   Subjective   Subjective Pt pleasant and cooperative throughout session   ADL   Eating Assistance 7  Independent   Grooming Assistance 5  [address] [facility] 5  Supervision/Setup   LB [address] 4  [address] 5  Supervision/Setup    [facility] 4  [address]  4  Minimal Assistance   Bed Mobility   Supine to Sit 5  Supervision   Additional items Increased time required   Sit to Supine 5  Supervision   Additional items Increased time required   Transfers   Sit to Stand 4  Minimal assistance   Additional items Assist x 1; Increased time required;Verbal cues   Stand to Sit 4  Minimal assistance   Additional items Assist x 1; Increased time required   Toilet transfer 4  Minimal assistance   Additional items Assist x 1;Standard toilet; Increased time required;Verbal cues   Functional Mobility   Functional Mobility 4  Minimal assistance   Additional Comments Assist x1   Additional items Rolling walker   Activity Tolerance   Activity Tolerance Patient limited by fatigue;Patient limited by pain   Nurse Made Aware RN cleared for therapy   RUE Assessment   RUE Assessment WFL   LUE Assessment   LUE Assessment WFL   Cognition   Overall Cognitive Status WFL   Arousal/Participation Alert; Responsive; Cooperative   Attention Attends with cues to redirect   Orientation Level Oriented X4   Memory Within functional limits   Following Commands Follows one step commands with increased time or repetition   Assessment   Limitation Decreased ADL status; Decreased self-care trans;Decreased high-level ADLs   Prognosis Fair   Assessment Pt  is 66 y o  female admitted to [facility] on 3/9/2022 s/p Acute renal failure (ARF) ([address] )  PMH includes HTN, obesity and AFib, psoriasis  Pt  currently resides with her daughter and granddaughter in a two story home with 1 CHHAYA  Pt reports that she stays on the second floor throughout the day and is only on the first floor if it is absolutely necessary  Prior to admission pt  was independent in ADLs, IADLs, and functional mobility  At baseline, pt  requires the use of a SPC inside the home and a RW for community distances  Currently, pt  requires min A for transfers, functional mobility, and LB ADL tasks  Pt also requires supervision for UB ADL tasks  Pt  demonstrates deficits in bathing, dressing, toileting, functional mobility/transfers, laundry , driving, house maintenance, meal prep and cleaning due to pain, activity tolerance and endurance and environment  OT d/c recommendations include home with home health rehabilitation due to a supportive family that can assist prn and current level of functioning  No further inpatient OT needs, d/c from OT caseload     Goals   Patient Goals To go home   Plan   OT Frequency Eval only   Recommendation   OT Discharge Recommendation Home with home health rehabilitation   OT - OK to Discharge Yes   AM-PAC Daily Activity Inpatient   Lower Body Dressing 3   Bathing 3   Toileting 3   Upper Body Dressing 3   Grooming 3   Eating 4   Daily Activity Raw Score 19   Daily Activity Standardized Score (Calc for Raw Score >=11) 40 22   AM-PAC Applied Cognition Inpatient   Following a Speech/Presentation 4   Understanding Ordinary Conversation 4   Taking Medications 4   Remembering Where Things Are Placed or Put Away 4   Remembering List of 4-5 Errands 4   Taking Care of Complicated Tasks 3   Applied Cognition Raw Score 23   Applied Cognition Standardized Score 53 08     The patient's raw score on the AM-PAC Daily Activity inpatient short form is 19, standardized score is 40 22, greater than 39 4  Patients at this level are likely to benefit from discharge to home  Please refer to the recommendation of the Occupational Therapist for safe discharge planning        Ayana Choi OTS

## 2022-03-14 NOTE — PLAN OF CARE
Problem: MOBILITY - ADULT  Goal: Maintain or return to baseline ADL function  Description: INTERVENTIONS:  -  Assess patient's ability to carry out ADLs; assess patient's baseline for ADL function and identify physical deficits which impact ability to perform ADLs (bathing, care of mouth/teeth, toileting, grooming, dressing, etc )  - Assess/evaluate cause of self-care deficits   - Assess range of motion  - Assess patient's mobility; develop plan if impaired  - Assess patient's need for assistive devices and provide as appropriate  - Encourage maximum independence but intervene and supervise when necessary  - Involve family in performance of ADLs  - Assess for home care needs following discharge   - Consider OT consult to assist with ADL evaluation and planning for discharge  - Provide patient education as appropriate  Outcome: Progressing     Problem: Prexisting or High Potential for Compromised Skin Integrity  Goal: Skin integrity is maintained or improved  Description: INTERVENTIONS:  - Identify patients at risk for skin breakdown  - Assess and monitor skin integrity  - Assess and monitor nutrition and hydration status  - Monitor labs   - Assess for incontinence   - Turn and reposition patient  - Assist with mobility/ambulation  - Relieve pressure over bony prominences  - Avoid friction and shearing  - Provide appropriate hygiene as needed including keeping skin clean and dry  - Evaluate need for skin moisturizer/barrier cream  - Collaborate with interdisciplinary team   - Patient/family teaching  - Consider wound care consult   Outcome: Progressing     Problem: Nutrition/Hydration-ADULT  Goal: Nutrient/Hydration intake appropriate for improving, restoring or maintaining nutritional needs  Description: Monitor and assess patient's nutrition/hydration status for malnutrition  Collaborate with interdisciplinary team and initiate plan and interventions as ordered    Monitor patient's weight and dietary intake as ordered or per policy  Utilize nutrition screening tool and intervene as necessary  Determine patient's food preferences and provide high-protein, high-caloric foods as appropriate  INTERVENTIONS:  - Monitor oral intake, urinary output, labs, and treatment plans  - Assess nutrition and hydration status and recommend course of action  - Evaluate amount of meals eaten  - Assist patient with eating if necessary   - Allow adequate time for meals  - Recommend/ encourage appropriate diets, oral nutritional supplements, and vitamin/mineral supplements  - Order, calculate, and assess calorie counts as needed  - Recommend, monitor, and adjust tube feedings and TPN/PPN based on assessed needs  - Assess need for intravenous fluids  - Provide specific nutrition/hydration education as appropriate  - Include patient/family/caregiver in decisions related to nutrition  Outcome: Progressing     Problem: Knowledge Deficit  Goal: Patient/family/caregiver demonstrates understanding of disease process, treatment plan, medications, and discharge instructions  Description: Complete learning assessment and assess knowledge base    Interventions:  - Provide teaching at level of understanding  - Provide teaching via preferred learning methods  Outcome: Progressing Unknown

## 2022-03-14 NOTE — CASE MANAGEMENT
Case Management Progress Note    Patient name Brian Bush  Location CW2 213/CW2 213-02 MRN [id number]  : 1950 Date 3/14/2022       LOS (days): 5  Geometric Mean LOS (GMLOS) (days): 3 10  Days to GMLOS:-1 5        OBJECTIVE:        Current admission status: Inpatient  Preferred Pharmacy:   [id number] [address] [place] [address], [address] [place] [address] Road  [address] [facility] [address]  Phone: [phone] Fax: [address] [facility] [address]  Phone: [phone] Fax: [phone]    Primary Care Provider: Veronica Ivy DO    Primary Insurance: Mobile  W Jamarcus MAYER  Secondary Insurance:     PROGRESS NOTE:    Cm reviewed pt care coordination rounds with attending  Pt is medically cleared for d/c  Awaiting for PT eval with steps  Pt will need transport for d/c  Cm will continue to follow through d/c      1547    Cm reviewed PT note and pt was recommended home vs IP rehab  Cm discussed PT recommendation with pt and pt refused IP rehab  Pt wants to go home with [facility] services  Pt is open with [address] [place] and will resume service with them  Cm was asked to set up transport to pt's home for 11 am  Dtr will accompanied pt home  Care team made aware

## 2022-03-14 NOTE — PLAN OF CARE
Problem: PHYSICAL THERAPY ADULT  Goal: Performs mobility at highest level of function for planned discharge setting  See evaluation for individualized goals  Description: Treatment/Interventions: Functional transfer training,LE strengthening/ROM,Elevations,Therapeutic exercise,Endurance training,Bed mobility,Gait training,Spoke to nursing  Equipment Recommended: Tasneem Shanks       See flowsheet documentation for full assessment, interventions and recommendations  Outcome: Progressing  Note: Prognosis: Fair  Problem List: Decreased strength,Decreased range of motion,Decreased endurance,Impaired balance,Decreased mobility,Decreased coordination,Decreased safety awareness,Pain,Decreased skin integrity  Assessment: Pt limited in ambulation distance tolerance due to RLE pain, fatigue, and LE weakness requiring seated rest  Attempting to trial stair negotiation for home set-up but unable to assess this date due to safety concerns with pt weakness, endurance deficits, and fall risk and pt also unformfortable with stairs at this point in time  She reports requiring two people to assist her up the stairs in the past and is unable to perform them at this date preventing her from returning home (bedroom/bathroom upstairs) unless a FFSU can be obtained  Pt stating at end of session she will discuss a FFSU with her dtr who she lives with  DC planning currently HHPT with increased support and FFSU vs post acute rehabilitation  Pt continues to benefit from skilled IP PT services for strengthening, endurance, and balance training to maximize safety and independence with all functional mobility  Barriers to Discharge: (S) Inaccessible home environment (Full flight to 2nd floor with bed/bath)        PT Discharge Recommendation:  (HHPT vs post acute rehabilitation services pending stairs )          See flowsheet documentation for full assessment

## 2022-03-14 NOTE — PHYSICAL THERAPY NOTE
PHYSICAL THERAPY TREATMENT     03/14/22 7963   Pain Assessment   Pain Assessment Tool 0-10   Pain Score 10 - Worst Possible Pain   Pain Location/Orientation Orientation: Right;Location: Leg   Hospital Pain Intervention(s) Repositioned; Ambulation/increased activity; Emotional support   Restrictions/Precautions   Weight Bearing Precautions Per Order No   Other Precautions Pain; Fall Risk   General   Chart Reviewed Yes   Cognition   Overall Cognitive Status WFL   Attention Attends with cues to redirect   Orientation Level Oriented X4   Memory Within functional limits   Following Commands Follows one step commands with increased time or repetition   Subjective   Subjective Pt willing to participate in therapy  Bed Mobility   Supine to Sit 5  Supervision   Additional items HOB elevated; Increased time required   Sit to Supine 5  Supervision   Additional items Increased time required   Transfers   Sit to Stand 4  Minimal assistance   Additional items Increased time required;Verbal cues  (VC for hand placement)   Stand to Sit 4  Minimal assistance   Additional items Increased time required;Verbal cues  (VC for hand placement)   Ambulation/Elevation   Gait pattern Excessively slow; Step to; Foward flexed;Decreased foot clearance; Wide CARMEN; Short stride   Gait Assistance 5  Supervision   Additional items Verbal cues  (VC for safety)   Assistive Device Rolling walker   Distance 20'x2   Ambulation/Elevation Additional Comments Unable to assess stair trials this session  Pt not approrpiate at this time and a safety risk for stair negotiation  Pt stating "last time I came home from the hospital 2 people from the ambulance held under both my arms and helped me up the stairs"   Discussed possible FFSU and mentioning safety concerns with stairs and if an emergency arose in the home (ex  fire), pt stating no BR up stairs but would discuss commode and sponge bathon the the first floor with her dtr and that "the living room is big enough to fit a bed too"  Balance   Static Sitting Fair -   Dynamic Sitting Fair -   Static Standing Fair -   Dynamic Standing Fair -   Ambulatory Fair -   Endurance Deficit   Endurance Deficit Yes   Endurance Deficit Description Pt requiring seated rest after 20' of ambulation    Activity Tolerance   Activity Tolerance Patient limited by fatigue;Patient limited by pain   Nurse Made Aware RN cleared pt for therapy    Assessment   Prognosis Fair   Problem List Decreased strength;Decreased range of motion;Decreased endurance; Impaired balance;Decreased mobility; Decreased coordination;Decreased safety awareness;Pain;Decreased skin integrity   Assessment Pt limited in ambulation distance tolerance due to RLE pain, fatigue, and LE weakness requiring seated rest  Attempting to trial stair negotiation for home set-up but unable to assess this date due to safety concerns with pt weakness, endurance deficits, and fall risk and pt also unformfortable with stairs at this point in time  She reports requiring two people to assist her up the stairs in the past and is unable to perform them at this date preventing her from returning home (bedroom/bathroom upstairs) unless a FFSU can be obtained  Pt stating at end of session she will discuss a FFSU with her dtr who she lives with  DC planning currently HHPT with increased support and FFSU vs post acute rehabilitation  Pt continues to benefit from skilled IP PT services for strengthening, endurance, and balance training to maximize safety and independence with all functional mobility  Barriers to Discharge Inaccessible home environment  (Full flight to 2nd floor with bed/bath)   Goals   Patient Goals To go home   STG Expiration Date 03/20/22   Plan   Treatment/Interventions Functional transfer training;LE strengthening/ROM; Elevations; Therapeutic exercise; Endurance training;Cognitive reorientation;Patient/family training;Equipment eval/education; Bed mobility;Gait training; Compensatory technique education;Continued evaluation;Spoke to nursing;OT   Progress Slow progress, decreased activity tolerance   PT Frequency 3-5x/wk   Recommendation   PT Discharge Recommendation   (HHPT with FFSU vs post acute rehab services)   Equipment Recommended [address] [facility] Recommended Wheeled walker   Change/add to [email or web address]? No   AM-PAC Basic Mobility Inpatient   Turning in Bed Without Bedrails 4   Lying on Back to Sitting on Edge of Flat Bed 4   Moving Bed to Chair 3   Standing Up From Chair 3   Walk in Room 3   Climb 3-5 Stairs 1   Basic Mobility Inpatient Raw Score 18   Basic Mobility Standardized Score 41 05   Highest Level Of Mobility   -HLM Goal 6: Walk 10 steps or more   Education   Education Provided   (FFSU and safety due to difficulties with stair negotiation )   Patient Demonstrates verbal understanding   End of Consult   Patient Position at End of Consult Supine; All needs within reach     [employer], SPT

## 2022-03-14 NOTE — ASSESSMENT & PLAN NOTE
· 1/2 bottles positive for Staph coag negative  Likely contaminant    Monitor off IV abx   · Repeat cultures negative at 48 hrs

## 2022-03-14 NOTE — ASSESSMENT & PLAN NOTE
Lab Results   Component Value Date    CREATININE 0 92 03/14/2022    CREATININE 0 93 03/13/2022    CREATININE 0 93 03/12/2022   POA  Baseline approximately 0 8    Cr on admission 1 5  · Possibly in setting of limited oral intake and reported vomiting  · U/A: Benign  · Now resolved with IV fluids and stable off   · Monitor volume status and I/O's  · Measure PVR and bladder scan  · Limit/Avoid nephrotoxins: hold HCTZ  · Avoid hypotension and fluctuations in BP  · Continue to Monitor Renal function- repeat am bmp

## 2022-03-15 VITALS
SYSTOLIC BLOOD PRESSURE: 120 MMHG | OXYGEN SATURATION: 97 % | RESPIRATION RATE: 18 BRPM | HEART RATE: 70 BPM | HEIGHT: 64 IN | TEMPERATURE: 98.2 F | WEIGHT: 257.31 LBS | BODY MASS INDEX: 43.93 KG/M2 | DIASTOLIC BLOOD PRESSURE: 58 MMHG

## 2022-03-15 LAB
ANION GAP SERPL CALCULATED.3IONS-SCNC: 3 MMOL/L (ref 4–13)
BACTERIA BLD CULT: NORMAL
BUN SERPL-MCNC: 17 MG/DL (ref 5–25)
CALCIUM SERPL-MCNC: 8.7 MG/DL (ref 8.3–10.1)
CHLORIDE SERPL-SCNC: 109 MMOL/L (ref 100–108)
CO2 SERPL-SCNC: 28 MMOL/L (ref 21–32)
CREAT SERPL-MCNC: 0.98 MG/DL (ref 0.6–1.3)
GFR SERPL CREATININE-BSD FRML MDRD: 57 ML/MIN/1.73SQ M
GLUCOSE SERPL-MCNC: 83 MG/DL (ref 65–140)
POTASSIUM SERPL-SCNC: 3.8 MMOL/L (ref 3.5–5.3)
SODIUM SERPL-SCNC: 140 MMOL/L (ref 136–145)

## 2022-03-15 PROCEDURE — 99239 HOSP IP/OBS DSCHRG MGMT >30: CPT | Performed by: STUDENT IN AN ORGANIZED HEALTH CARE EDUCATION/TRAINING PROGRAM

## 2022-03-15 PROCEDURE — 80048 BASIC METABOLIC PNL TOTAL CA: CPT | Performed by: PHYSICIAN ASSISTANT

## 2022-03-15 RX ORDER — LANOLIN ALCOHOL/MO/W.PET/CERES
CREAM (GRAM) TOPICAL 3 TIMES DAILY
Qty: 454 G | Refills: 0 | Status: SHIPPED | OUTPATIENT
Start: 2022-03-15

## 2022-03-15 RX ORDER — ACITRETIN 10 MG/1
10 CAPSULE ORAL DAILY
Qty: 14 CAPSULE | Refills: 0 | Status: ON HOLD | OUTPATIENT
Start: 2022-03-15 | End: 2022-07-12 | Stop reason: CLARIF

## 2022-03-15 RX ADMIN — Medication 1000 UNITS: at 09:44

## 2022-03-15 RX ADMIN — APIXABAN 5 MG: 5 TABLET, FILM COATED ORAL at 09:44

## 2022-03-15 RX ADMIN — GUAIFENESIN 600 MG: 600 TABLET, EXTENDED RELEASE ORAL at 03:01

## 2022-03-15 RX ADMIN — PANTOPRAZOLE SODIUM 40 MG: 40 TABLET, DELAYED RELEASE ORAL at 06:18

## 2022-03-15 RX ADMIN — METOPROLOL TARTRATE 25 MG: 25 TABLET, FILM COATED ORAL at 09:44

## 2022-03-15 RX ADMIN — Medication: at 09:44

## 2022-03-15 RX ADMIN — TRIAMCINOLONE ACETONIDE: 1 OINTMENT TOPICAL at 09:44

## 2022-03-15 RX ADMIN — LIDOCAINE 5% 1 PATCH: 700 PATCH TOPICAL at 09:44

## 2022-03-15 RX ADMIN — BENZONATATE 100 MG: 100 CAPSULE ORAL at 03:01

## 2022-03-15 RX ADMIN — HYDROXYCHLOROQUINE SULFATE 200 MG: 200 TABLET, FILM COATED ORAL at 09:44

## 2022-03-15 RX ADMIN — PREDNISONE 5 MG: 5 TABLET ORAL at 09:44

## 2022-03-15 NOTE — ASSESSMENT & PLAN NOTE
· Cough with reported posttussive emesis for past 2 days, additionally reported fever at home to 101 5° F  Patient with overall generalized fatigue/weakness  · CXR 3/10- no evidence of acute cardiopulmonary disease   · Procalcitonin: 0 18-->0 30-->0 19  · COVID-19/influenza/RSV PCR: Negative  · BC x2: 1/2 bottles Staph coag negative    Likely contaminant, discontinued IV Vanco and monitored off abx   · Repeat cultures negative at 24 hrs   · Supportive care otherwise in the interim  · PT/OT: recommending home with PT/OT/VNA

## 2022-03-15 NOTE — ASSESSMENT & PLAN NOTE
· Continue Home medications:  · Metoprolol tartrate 25 mg b i d   With strict hold parameters  · HCTZ was held in setting of acute renal failure; resume on discharge  · Monitor blood pressure per protocol

## 2022-03-15 NOTE — CASE MANAGEMENT
Case Management Discharge Planning Note    Patient name Sophia Duenas  Location 2 213/CW2 213-02 MRN [id number]  : 1950 Date 3/15/2022       Current Admission Date: 3/9/2022  Current Admission Diagnosis:Acute renal failure (ARF) [facility])   Patient Active Problem List    Diagnosis Date Noted    Positive blood culture 2022    Psoriasis 2022    Elevated troponin 2022    COVID-19 2022    Acute renal failure (ARF) ([address] ) 01/10/2022    Paroxysmal atrial fibrillation ([facility] ) 01/10/2022    Continuous opioid dependence ([facility] ) 2021    Hyperparathyroidism ([facility] ) 2021    Medicare annual wellness visit, subsequent 2021    Murmur, cardiac 2021    Morbid obesity with BMI of 40 0-44 9, adult ([facility] ) 2019    BPPV (benign paroxysmal positional vertigo) 2018    Seasonal allergic rhinitis due to pollen 2018    Cough 2018    Erythroderma 10/27/2017    Osteoporosis 2016    Rheumatoid arthritis ([facility] ) 2016    GERD (gastroesophageal reflux disease) 2016    Sarcoid 2016    Morbid obesity ([facility] ) 2016    Vitamin D deficiency 2015    Benign essential hypertension 2014    Lumbar radiculopathy 2014      LOS (days): 6  Geometric Mean LOS (GMLOS) (days): 3 10  Days to GMLOS:-2 4     OBJECTIVE:  Risk of Unplanned Readmission Score: 23         Current admission status: Inpatient   Preferred Pharmacy:   [facility] [address] [place] [address]  Phone: [phone] Fax: [phone], [place] [address]  Phone: [phone] Fax: [phone]    Primary Care Provider: Humera Clinton DO    Primary Insurance: Shauna Jarquin W [facility] REP  Secondary Insurance:     DISCHARGE DETAILS:    Discharge planning discussed with[de-identified] Daughter via phone       Other Referral/Resources/Interventions Provided:  Interventions: [facility]  Referral Comments: AVS faxed to [address] [place] [address] for discharge follow-up  Treatment Team Recommendation: Home with [address] [place] [address]  Discharge Destination Plan[de-identified] Home with Ronniejerrelljose at Discharge : [facility]  Dispatcher Contacted: Yes  Number/Name of Dispatcher: [facility]TS  Transported by [employer] and Unit #): [facility]  ETA of Transport (Date): 03/15/22  ETA of Transport (Time): 1100     Transfer Mode: Stretcher      Additional Comments: Daughter informed via phone -- She will visit the hospital prior to transfer to ensure she has her clothing

## 2022-03-15 NOTE — DISCHARGE INSTRUCTIONS
You were admitted for fever and vomiting  You were found to have a psoriasis flare  Wound care instructions:   - Apply triamcinolone 0 1% ointment 2x/day to most bothersome areas on body (not face, groin areas) for 2 weeks  Apply prescription ointment before applying moisturizing ointment (eucerin)  - Recommend applying liberal amount of eucerin ointment to be applied to all affected areas 3-4x/day  This is should be done especially after shower (do not fully dry skin before applying eucerin ointment)  This should continue after discharge  - Start low dose acitretin 10mg once daily for 2 weeks  Will need repeat CBC, CMP, lipid panel in 2 weeks       - Please make sure to contact dermatology for outpatient follow up by calling office: [phone])

## 2022-03-15 NOTE — DISCHARGE SUMMARY
[facility]  Discharge- Sancho Fuller 1950, 66 y o  female MRN: [id number]  Unit/Bed#: 2 213-02 Encounter: [id number]  Primary Care Provider: Justus Bence, DO   Date and time admitted to hospital: 3/9/2022  4:30 PM    * Acute renal failure (ARF) [facility])  Assessment & Plan  Lab Results   Component Value Date    CREATININE 0 92 03/14/2022    CREATININE 0 93 03/13/2022    CREATININE 0 93 03/12/2022     POA  Baseline approximately 0 8  Cr on admission 1 5  · Likely in setting of limited oral intake and reported vomiting  · U/A: Benign  · Now resolved with IV fluids and stable off   · Limit/Avoid nephrotoxins: HCTZ was held during admission, can resume on discharge  · Avoid hypotension and fluctuations in BP  · Continue to Monitor Renal function- repeat bmp in 1-2 weeks as an outpt    Psoriasis  Assessment & Plan  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  Could be responsible for fever, n/v   · Recommendations are as follows: Apply triamcinolone 0 1% ointment 2x/day to most bothersome areas on body (not face, groin areas) for 2 weeks  Apply prescription ointment before applying moisturizing ointment (eucerin)  Recommend applying liberal amount of eucerin ointment (or its hospital equivalent) to be applied to all affected areas 3-4x/day  This is should be done especially after shower (do not fully dry skin before applying eucerin ointment)  This should continue after discharge  · check a lipid panel, and if okay would start patient on low dose acitretin 10mg once daily for 2 weeks  This is non-formulary and would need to be ordered as an outpt -- ordered on discharge  · Will need repeat CBC, CMP, lipid panel in 2 weeks  · F/u with Derm after discharge     Cough  Assessment & Plan  · Cough with reported posttussive emesis for past 2 days, additionally reported fever at home to 101 5° F   Patient with overall generalized fatigue/weakness  · CXR 3/10- no evidence of acute cardiopulmonary disease   · Procalcitonin: 0 18-->0 30-->0 19  · COVID-19/influenza/RSV PCR: Negative  · BC x2: 1/2 bottles Staph coag negative  Likely contaminant, discontinued IV Vanco and monitored off abx   · Repeat cultures negative at 24 hrs   · Supportive care otherwise in the interim  · PT/OT: recommending home with PT/OT/VNA    Elevated troponin  Assessment & Plan  · Troponin 64->70-->68  · Patient without complaints of chest pain  · EKG without acute ischemic change  · Suspect demand ischemia secondary to acute renal failure and possible infectious etiology  · Monitor for development of cardiac symptoms    Positive blood culture  Assessment & Plan  · 1/2 bottles positive for Staph coag negative  Likely contaminant  Monitor off IV abx   · Repeat cultures negative at 48 hrs    Paroxysmal atrial fibrillation (HCC)  Assessment & Plan  · Recent diagnosis at prior hospitalization  · Currently in sinus rhythm  · Continue Home medications:  · Metoprolol tartrate and A/C with Eliquis    Benign essential hypertension  Assessment & Plan  · Continue Home medications:  · Metoprolol tartrate 25 mg b i d  With strict hold parameters  · HCTZ was held in setting of acute renal failure; resume on discharge  · Monitor blood pressure per protocol    Rheumatoid arthritis ([facility] [address] )  Assessment & Plan  · Follows with Rheumatology (Dr Zach Olivares)  · Continue prednisone 5 mg b i d , Plaquenil    Patient states she is no longer taking Humira    Morbid obesity with BMI of 40 0-44 9, adult [facility])  Assessment & Plan  · Encourage Lifestyle modifications    Medical Problems             Resolved Problems  Date Reviewed: 3/15/2022    None              Discharging Physician / Practitioner: Edgard Mendez MD  PCP: Canelo Bowman DO  Admission Date:   Admission Orders (From admission, onward)     Ordered        03/09/22 2033  Inpatient Admission  Once                      Discharge Date: 03/15/22    Consultations During Hospital Stay:  · Dermatology     Procedures Performed:   · None    Significant Findings / Test Results:   · 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     Incidental Findings:   · None     Test Results Pending at Discharge (will require follow up): · None     Outpatient Tests Requested:  · CMP, CBC, and lipid panel in 2 weeks  · Outpatient dermatology follow up    Complications:  None     Reason for Admission: cough, vomiting, fever, AMANDA    Hospital Course:   Tita Rivera is a 66 y o  female patient who originally presented to the hospital on 3/9/2022 due to fever and vomiting at home  She recently had a prolonged hospitalization here from 1/10-1/26/22 with sepsis 2/2 bronchitis and also developed COVID-19 infection  She presented on 3/9 for about 1 day of worsening fatigue, poor appetite, productive cough with intermittent vomiting, and fever to 101 5F  She was found to have erythroderma from pustular psoriasis which could have caused her symptoms  Dermatology recommendations as above  She was also found to have an AMANDA likely prerenal from poor oral intake  AMANDA resolved upon discharge  Please see above list of diagnoses and related plan for additional information  Condition at Discharge: good    Discharge Day Visit / Exam:   Subjective:  States that she feels well  Denies any acute complaints  Vitals: Blood Pressure: 120/58 (03/15/22 0715)  Pulse: 70 (03/15/22 0715)  Temperature: 98 2 °F (36 8 °C) (03/14/22 1539)  Temp Source: Oral (03/11/22 2126)  Respirations: 18 (03/14/22 0647)  Height: 5' 4" (162 6 cm) (03/09/22 1700)  Weight - Scale: 117 kg (257 lb 5 oz) (03/15/22 0537)  SpO2: 97 % (03/15/22 0715)  Exam:   Physical Exam  Vitals and nursing note reviewed  Constitutional:       General: She is not in acute distress  Appearance: She is obese  HENT:      Head: Normocephalic        Mouth/Throat:      Mouth: Mucous membranes are moist    Eyes:      Extraocular Movements: Extraocular movements intact  Pupils: Pupils are equal, round, and reactive to light  Cardiovascular:      Rate and Rhythm: Normal rate and regular rhythm  Pulses: Normal pulses  Heart sounds: No murmur heard  Pulmonary:      Effort: Pulmonary effort is normal  No respiratory distress  Breath sounds: Normal breath sounds  Abdominal:      Palpations: Abdomen is soft  Tenderness: There is no abdominal tenderness  Musculoskeletal:      Right lower leg: No edema  Left lower leg: No edema  Skin:     Comments: Multiple areas of excoriating rash and plaques  Neurological:      General: No focal deficit present  Mental Status: She is alert and oriented to person, place, and time  Mental status is at baseline  Psychiatric:         Mood and Affect: Mood normal          Behavior: Behavior normal           Discussion with Family: Patient declined call to   Discharge instructions/Information to patient and family:   See after visit summary for information provided to patient and family  Provisions for Follow-Up Care:  See after visit summary for information related to follow-up care and any pertinent home health orders  Disposition:   Home with VNA Services (Reminder: Complete face to face encounter)    Planned Readmission: No     Discharge Statement:  I spent 50 minutes discharging the patient  This time was spent on the day of discharge  I had direct contact with the patient on the day of discharge  Greater than 50% of the total time was spent examining patient, answering all patient questions, arranging and discussing plan of care with patient as well as directly providing post-discharge instructions  Additional time then spent on discharge activities  Discharge Medications:  See after visit summary for reconciled discharge medications provided to patient and/or family        **Please Note: This note may have been constructed using a voice recognition system**

## 2022-03-15 NOTE — ASSESSMENT & PLAN NOTE
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  Could be responsible for fever, n/v   · Recommendations are as follows: Apply triamcinolone 0 1% ointment 2x/day to most bothersome areas on body (not face, groin areas) for 2 weeks  Apply prescription ointment before applying moisturizing ointment (eucerin)  Recommend applying liberal amount of eucerin ointment (or its hospital equivalent) to be applied to all affected areas 3-4x/day  This is should be done especially after shower (do not fully dry skin before applying eucerin ointment)  This should continue after discharge  · check a lipid panel, and if okay would start patient on low dose acitretin 10mg once daily for 2 weeks  This is non-formulary and would need to be ordered as an outpt -- ordered on discharge  · Will need repeat CBC, CMP, lipid panel in 2 weeks     · F/u with Derm after discharge

## 2022-03-15 NOTE — ASSESSMENT & PLAN NOTE
· Follows with Rheumatology (Dr Zambrano )  · Continue prednisone 5 mg b i d , Plaquenil    Patient states she is no longer taking Humira

## 2022-03-17 ENCOUNTER — TELEMEDICINE (OUTPATIENT)
Dept: INTERNAL MEDICINE CLINIC | Facility: CLINIC | Age: 72
End: 2022-03-17
Payer: COMMERCIAL

## 2022-03-17 ENCOUNTER — TRANSITIONAL CARE MANAGEMENT (OUTPATIENT)
Dept: INTERNAL MEDICINE CLINIC | Facility: CLINIC | Age: 72
End: 2022-03-17

## 2022-03-17 ENCOUNTER — TELEPHONE (OUTPATIENT)
Dept: INTERNAL MEDICINE CLINIC | Facility: CLINIC | Age: 72
End: 2022-03-17

## 2022-03-17 DIAGNOSIS — I48.0 PAROXYSMAL ATRIAL FIBRILLATION (HCC): ICD-10-CM

## 2022-03-17 DIAGNOSIS — L40.1 PUSTULAR PSORIASIS: Primary | ICD-10-CM

## 2022-03-17 DIAGNOSIS — M54.16 LUMBAR RADICULOPATHY: ICD-10-CM

## 2022-03-17 DIAGNOSIS — I10 BENIGN ESSENTIAL HYPERTENSION: ICD-10-CM

## 2022-03-17 LAB
BACTERIA BLD CULT: NORMAL
BACTERIA BLD CULT: NORMAL

## 2022-03-17 PROCEDURE — 99496 TRANSJ CARE MGMT HIGH F2F 7D: CPT | Performed by: INTERNAL MEDICINE

## 2022-03-17 PROCEDURE — 1111F DSCHRG MED/CURRENT MED MERGE: CPT | Performed by: INTERNAL MEDICINE

## 2022-03-17 RX ORDER — GABAPENTIN 100 MG/1
CAPSULE ORAL
Qty: 81 CAPSULE | Refills: 0 | Status: SHIPPED | OUTPATIENT
Start: 2022-03-17 | End: 2022-04-21 | Stop reason: SDUPTHER

## 2022-03-17 NOTE — PROGRESS NOTES
Virtual TCM Visit:    Verification of patient location:    Patient is located in the following state in which I hold an active license [place]    Assessment/Plan:        Problem List Items Addressed This Visit        Cardiovascular and Mediastinum    Benign essential hypertension     -she restarted hctz 12 5mg daily at discharge  Also on metoprolol tartrate 25mg bid  -adhere to low sodium diet  -lose weight         Paroxysmal atrial fibrillation (HCC)     -pt asymptomatic  -continue metoprolol tartrate 25mg q12  -on eliquis for stroke ppx            Nervous and Auditory    Lumbar radiculopathy     -trial of gabapentin 300mg qhs  -may continue to apply topical ointment and lidocaine  -was referred to Pain Management but did ont make it to appt due to her hospitalization         Relevant Medications    gabapentin (Neurontin) 100 mg capsule       Musculoskeletal and Integument    Pustular psoriasis - Primary     -s/p biopsy  -use triamcinolone ointment bid as instructed by Derm  -obtain labs and start acitretin 10mg daily x 2 weeks, pt has rx                   Reason for visit is TCM  Encounter provider Sunita Salinas DO       Provider located at [address] [id number]      Recent Visits  No visits were found meeting these conditions  Showing recent visits within past 7 days and meeting all other requirements  Today's Visits  Date Type Provider Dept   03/17/22 Telephone Kee Steinberg8 [address] [facility]   03/17/22 Telemedicine Agustina Steinberg [address] [facility]   Showing today's visits and meeting all other requirements  Future Appointments  No visits were found meeting these conditions  Showing future appointments within next 150 days and meeting all other requirements       After connecting through [employer], the patient was identified by name and date of birth   Everett Erickson was informed that this is a telemedicine visit and that the visit is being conducted through [address] [place] [address] Now and patient was informed that this is a secure, HIPAA-compliant platform  She agrees to proceed     My office door was closed  No one else was in the room  She acknowledged consent and understanding of privacy and security of the video platform  The patient has agreed to participate and understands they can discontinue the visit at any time  Patient is aware this is a billable service  Subjective:     Patient ID: Opal Gallegos is a 66 y o  female  HPI  75yo female with HTN, R lumbar radiculopathy, RA, vitamin D def, osteoporosis, sarcoidosis here for TCM  Her granddaughter is present for the call  She was hospitalized at [facility] 1/10-1/26/22 for sepsis secondary to bronchitis  She was treated with abx  During her hospitalization, she was in contact with +COVID person and subsequently tested +COVID herself  She received remdesivir, did not require oxygen  She also had presented in afib with RVR, started on lopressor 25mg bid and eliquis for stroke ppx  She has had a chronic rash that was evaluated by Derm, bx showed pustular psoriasis  She was sent to [facility] for rehab and discharged on 2/8/22  She was rehospitalized at [facility] 3/9-3/15/22 for generalized weakness, vomiting  Found to have AMANDA, hctz was withheld and given IVF  Had erythroderma from pustula psoriasis, which was treated with ointments per derm  Derm considered possible acitretin with lipid panel was normal   She has yet to schedule follow up with Derm  Notes no change in her rash and is very pruritic  She is not walking due to R lumbar radiculopathy that she was referred to Pain Management for but has yet to establish because she became ill  She is applying aspercreme and lidocaine patch  She does not monitor her home bp and reports it is "fine" when VNA takes it, they come twice weekly  Review of Systems   Constitutional: Positive for activity change   Negative for appetite change, fatigue and fever  Respiratory: Negative for cough, chest tightness, shortness of breath and wheezing  Cardiovascular: Negative for chest pain, palpitations and leg swelling  Gastrointestinal: Negative for abdominal pain and blood in stool  Musculoskeletal: Positive for arthralgias, back pain, gait problem and joint swelling  Skin: Positive for color change and rash  Negative for pallor and wound  Neurological: Positive for numbness  Hematological: Does not bruise/bleed easily  Objective: There were no vitals filed for this visit  Physical Exam  Vitals reviewed  Constitutional:       General: She is not in acute distress  Appearance: She is obese  She is not diaphoretic  HENT:      Head: Normocephalic  Pulmonary:      Effort: Pulmonary effort is normal  No respiratory distress  Comments: No conversational dyspnea  Musculoskeletal:      Right lower leg: No edema  Left lower leg: No edema  Skin:     Coloration: Skin is not pale  Findings: Erythema (erythroderma on neck) and rash (scattered patches of plaques on R leg) present  Neurological:      Mental Status: She is alert and oriented to person, place, and time  Psychiatric:         Attention and Perception: Attention normal          Mood and Affect: Mood normal          Speech: Speech normal          Behavior: Behavior is cooperative  Transitional Care Management Review:  Dio Acevedo is a 66 y o  female here for TCM follow up       During the TCM phone call patient stated:    TCM Call (since 2/14/2022)     Date and time call was made  3/17/2022  9:06 AM    Hospital care reviewed  Records reviewed        Patient was hospitialized at  [facility]        Date of Admission  03/09/22    Date of discharge  03/15/22    Diagnosis  acute renal failure     Disposition  Home    Were the patients medications reviewed and updated  Yes    Current Symptoms  None      TCM Call (since 2/14/2022) Post hospital issues  None    Scheduled for follow up? Yes    I have advised the patient to call PCP with any new or worsening symptoms  Marleen Blank MA     Counseling  Patient    Comments  Appointment scheduled for 3/17/2022          I have spent 45 minutes with Patient and family today in which greater than 50% of this time was spent in counseling/coordination of care regarding Diagnostic results, Prognosis, Risks and benefits of tx options, Intructions for management, Patient and family education, Importance of tx compliance, Risk factor reductions, Impressions and chart review of both hospitalizations including labs  Saira Ruiz, DO      VIRTUAL VISIT [facility] verbally agrees to participate in [employer]  Pt is aware that [employer] could be limited without vital signs or the ability to perform a full hands-on physical [id number] understands she or the provider may request at any time to terminate the video visit and request the patient to seek care or treatment in person

## 2022-03-17 NOTE — ASSESSMENT & PLAN NOTE
-s/p biopsy  -use triamcinolone ointment bid as instructed by Derm  -obtain labs and start acitretin 10mg daily x 2 weeks, pt has rx

## 2022-03-17 NOTE — ASSESSMENT & PLAN NOTE
-improved following IVF and med adjustments  -hctz restarted at discharge  -due for BMP in 1-2 weeks

## 2022-03-17 NOTE — TELEPHONE ENCOUNTER
I left a message and when patient calls back please schedule 2 week follow up with Dr Darcy Al and it can be virtual   Thank you

## 2022-03-17 NOTE — ASSESSMENT & PLAN NOTE
-trial of gabapentin 300mg qhs  -may continue to apply topical ointment and lidocaine  -was referred to Pain Management but did ont make it to appt due to her hospitalization

## 2022-03-17 NOTE — ASSESSMENT & PLAN NOTE
-she restarted hctz 12 5mg daily at discharge    Also on metoprolol tartrate 25mg bid  -adhere to low sodium diet  -lose weight

## 2022-03-22 DIAGNOSIS — I48.91 NEW ONSET A-FIB (HCC): ICD-10-CM

## 2022-03-24 ENCOUNTER — VBI (OUTPATIENT)
Dept: ADMINISTRATIVE | Facility: OTHER | Age: 72
End: 2022-03-24

## 2022-04-18 DIAGNOSIS — I48.91 NEW ONSET A-FIB (HCC): ICD-10-CM

## 2022-04-18 DIAGNOSIS — I48.91 ATRIAL FIBRILLATION, NEW ONSET (HCC): ICD-10-CM

## 2022-04-18 DIAGNOSIS — M54.16 LUMBAR RADICULOPATHY: ICD-10-CM

## 2022-04-18 NOTE — TELEPHONE ENCOUNTER
Please schedule patient for a visit, VV ok if she not come in but please encourage her to do so    Thanks

## 2022-04-21 ENCOUNTER — TELEMEDICINE (OUTPATIENT)
Dept: INTERNAL MEDICINE CLINIC | Facility: CLINIC | Age: 72
End: 2022-04-21
Payer: COMMERCIAL

## 2022-04-21 ENCOUNTER — TELEPHONE (OUTPATIENT)
Dept: INTERNAL MEDICINE CLINIC | Facility: CLINIC | Age: 72
End: 2022-04-21

## 2022-04-21 DIAGNOSIS — I10 BENIGN ESSENTIAL HYPERTENSION: ICD-10-CM

## 2022-04-21 DIAGNOSIS — I48.0 PAROXYSMAL ATRIAL FIBRILLATION (HCC): ICD-10-CM

## 2022-04-21 DIAGNOSIS — L40.1 PUSTULAR PSORIASIS: ICD-10-CM

## 2022-04-21 DIAGNOSIS — M54.16 LUMBAR RADICULOPATHY: Primary | ICD-10-CM

## 2022-04-21 PROBLEM — Z00.00 MEDICARE ANNUAL WELLNESS VISIT, SUBSEQUENT: Status: RESOLVED | Noted: 2021-12-02 | Resolved: 2022-04-21

## 2022-04-21 PROBLEM — R78.81 POSITIVE BLOOD CULTURE: Status: RESOLVED | Noted: 2022-03-11 | Resolved: 2022-04-21

## 2022-04-21 PROBLEM — F11.20 CONTINUOUS OPIOID DEPENDENCE (HCC): Status: RESOLVED | Noted: 2021-12-02 | Resolved: 2022-04-21

## 2022-04-21 PROCEDURE — 1036F TOBACCO NON-USER: CPT | Performed by: INTERNAL MEDICINE

## 2022-04-21 PROCEDURE — 99214 OFFICE O/P EST MOD 30 MIN: CPT | Performed by: INTERNAL MEDICINE

## 2022-04-21 PROCEDURE — 1160F RVW MEDS BY RX/DR IN RCRD: CPT | Performed by: INTERNAL MEDICINE

## 2022-04-21 RX ORDER — GABAPENTIN 100 MG/1
CAPSULE ORAL
Qty: 81 CAPSULE | Refills: 0 | OUTPATIENT
Start: 2022-04-21 | End: 2022-05-21

## 2022-04-21 RX ORDER — HYDROCHLOROTHIAZIDE 12.5 MG/1
12.5 TABLET ORAL DAILY
Qty: 90 TABLET | Refills: 0 | Status: SHIPPED | OUTPATIENT
Start: 2022-04-21 | End: 2022-07-19 | Stop reason: SDUPTHER

## 2022-04-21 RX ORDER — GABAPENTIN 100 MG/1
CAPSULE ORAL
Qty: 120 CAPSULE | Refills: 1 | Status: SHIPPED | OUTPATIENT
Start: 2022-04-21 | End: 2022-07-05 | Stop reason: SDUPTHER

## 2022-04-21 RX ORDER — HYDROXYCHLOROQUINE SULFATE 200 MG/1
200 TABLET, FILM COATED ORAL 2 TIMES DAILY
OUTPATIENT
Start: 2022-04-21

## 2022-04-21 RX ORDER — POTASSIUM CHLORIDE 750 MG/1
10 TABLET, EXTENDED RELEASE ORAL DAILY
Qty: 30 TABLET | Refills: 0 | Status: SHIPPED | OUTPATIENT
Start: 2022-04-21 | End: 2022-05-26 | Stop reason: SDUPTHER

## 2022-04-21 NOTE — ASSESSMENT & PLAN NOTE
-home BP are normotensive  -continue hctz 12 5mg daily and metoprolol tartrate 25mg bid  -lose weight and reinforced low sodium diet

## 2022-04-21 NOTE — TELEPHONE ENCOUNTER
Pt left a VM trying to verify video call for this afternoon after 1? Is she supposed to be on the schedule?

## 2022-04-21 NOTE — PROGRESS NOTES
Virtual Regular Visit    Verification of patient location:    Patient is located in the following state in which I hold an active license [place]      Assessment/Plan:    Problem List Items Addressed This Visit        Cardiovascular and Mediastinum    Benign essential hypertension     -home BP are normotensive  -continue hctz 12 5mg daily and metoprolol tartrate 25mg bid  -lose weight and reinforced low sodium diet         Relevant Medications    hydrochlorothiazide (HYDRODIURIL) 12 5 mg tablet    metoprolol tartrate (LOPRESSOR) 25 mg tablet    Paroxysmal atrial fibrillation (HCC)     -she remains asymptomatic  -continue BB and eliquis for stroke ppx         Relevant Medications    metoprolol tartrate (LOPRESSOR) 25 mg tablet       Nervous and Auditory    Lumbar radiculopathy - Primary     -reports overnight relief of back pain with gabapentin 300mg qhs but pain returns in AM   Therefore will add 100mg in AM, dose will be limited by tolerance of medication         Relevant Medications    gabapentin (Neurontin) 100 mg capsule       Musculoskeletal and Integument    Pustular psoriasis     -resolving  Improved with acitretin 10mg x2 week treatment  -encouraged to follow up with Derm                    Reason for visit is   Chief Complaint   Patient presents with    Virtual Regular Visit        Encounter provider Susie Green DO    Provider located at [address] [id number]      Recent Visits  No visits were found meeting these conditions  Showing recent visits within past 7 days and meeting all other requirements  Today's Visits  Date Type Provider Dept   04/21/22 Telemedicine Nya Veloz [address], [address] Internal Med   04/21/22 Telephone Ignacio Driver [address] Internal Med   Showing today's visits and meeting all other requirements  Future Appointments  No visits were found meeting these conditions    Showing future appointments within next 150 days and meeting all other requirements       The patient was identified by name and date of birth  Yesenia Trejo was informed that this is a telemedicine visit and that the visit is being conducted through [address] [employer] and patient was informed that this is a secure, HIPAA-compliant platform  She agrees to proceed     My office door was closed  No one else was in the room  She acknowledged consent and understanding of privacy and security of the video platform  The patient has agreed to participate and understands they can discontinue the visit at any time  Patient is aware this is a billable service  Subjective  Yesenia Trejo is a 66 y o  female with RA, HTN, PAF, pustular psoriasis, vitamin D def, osteoporosis, sarcoidosis here for follow up care  Daughter Debbie Vital is present for the call   HPI     Reports she is doing well  She continues with PT and is now able to walk down 2-3 steps  No SOB and does not feel tired  Her BP has been around 112/60  No palpitations or CP  No bleeding episodes on eliquis  She was tried on gabapentin for lumbar radiculopathy at last visit and reports she is able to sleep without pain however it returns in the morning  She has not been applying any topical agents on her back  She completed acitretin for pustular psoriasis which is resolving her rash  She has not scheduled an appt with Derm or followed up with her Rheum      Past Medical History:   Diagnosis Date    Abnormal thyroid function test     last assessed: 2015     Arthritis     Caries     last assessed: 2016     Continuous opioid dependence [facility]) 2021    Edema of right lower extremity     last assessed: 2015     GERD (gastroesophageal reflux disease)     Hypertension     Medicare annual wellness visit, subsequent 2021    Positive blood culture 3/11/2022    Sarcoid        Past Surgical History:   Procedure Laterality Date     SECTION      MULTIPLE TOOTH EXTRACTIONS N/A 8/27/2016    Procedure: Surgical extraction of teeth 2, 18, 19, 30, 31; incision and drainage of left subperiosteal abscess ;  Surgeon: Chang Gamez DMD;  Location: [facility];  Service:        Current Outpatient Medications   Medication Sig Dispense Refill    acetaminophen (TYLENOL) 325 mg tablet Take 2 tablets (650 mg total) by mouth every 4 (four) hours as needed for mild pain, headaches or fever  30 tablet 0    alendronate (FOSAMAX) 70 mg tablet Take by mouth every 7 days       apixaban (ELIQUIS) 5 mg Take 1 tablet (5 mg total) by mouth 2 (two) times a day 60 tablet 0    cholecalciferol (VITAMIN D3) 1,000 units tablet Take 1,000 Units by mouth daily      Clobetasol Propionate E 0 05 % emollient cream APPLY TWICE A DAY FOR PALM SKIN DERMITITS      guaiFENesin (MUCINEX) 600 mg 12 hr tablet Take 1 tablet (600 mg total) by mouth every 12 (twelve) hours as needed for cough or congestion  0    hydrochlorothiazide (HYDRODIURIL) 12 5 mg tablet Take 1 tablet (12 5 mg total) by mouth daily 90 tablet 0    hydroxychloroquine (PLAQUENIL) 200 mg tablet Take 200 mg by mouth 2 (two) times a day   ketoconazole (NIZORAL) 2 % cream Apply topically 2 (two) times a day 60 g 0    metoprolol tartrate (LOPRESSOR) 25 mg tablet Take 1 tablet (25 mg total) by mouth every 12 (twelve) hours 90 tablet 0    omeprazole (PriLOSEC) 20 mg delayed release capsule TAKE 1 CAPSULE DAILY 90 capsule 3    potassium chloride (K-DUR,KLOR-CON) 10 mEq tablet Take 1 tablet (10 mEq total) by mouth daily 30 tablet 0    predniSONE 5 mg tablet Take 5 mg by mouth daily   1 mg prednisone 2 x/day      triamcinolone (KENALOG) 0 1 % cream Apply 1 application topically 2 (two) times a day To affected area  0    white petrolatum-mineral oil (EUCERIN,HYDROCERIN) cream Apply topically 3 (three) times a day 454 g 0    acitretin (SORIATANE) 10 MG capsule Take 1 capsule (10 mg total) by mouth daily for 14 days 14 capsule 0    Adalimumab (HUMIRA PEN) 40 MG/0 8ML PNKT Inject 40 mg under the skin every 14 (fourteen) days   (Patient not taking: Reported on 4/21/2022 )      gabapentin (Neurontin) 100 mg capsule Take 1 capsule in AM and 3 capsules in  capsule 1    lidocaine (LIDODERM) 5 % Apply 1 patch topically daily Remove & Discard patch within 12 hours or as directed by MD (Patient not taking: Reported on 4/21/2022 )  0    Miconazole Nitrate (Manjeet Antifungal) 2 % cream Apply topically 2 (two) times a day To sacrum (Patient not taking: Reported on 4/21/2022 )  0     No current facility-administered medications for this visit  Allergies   Allergen Reactions    Methotrexate Derivatives     Shellfish-Derived Products - Food Allergy Itching    Amoxicillin Rash    Ampicillin-Sulbactam Sodium Rash       Review of Systems   Constitutional: Positive for activity change  Negative for appetite change, fatigue and unexpected weight change  Respiratory: Negative for cough, shortness of breath and wheezing  Cardiovascular: Negative for chest pain, palpitations and leg swelling  Musculoskeletal: Positive for back pain  Skin: Positive for color change and rash  Neurological: Positive for numbness and headaches (infrequent)  Negative for dizziness  Video Exam    There were no vitals filed for this visit  Physical Exam  Constitutional:       General: She is not in acute distress  Appearance: She is obese  Pulmonary:      Effort: Pulmonary effort is normal  No respiratory distress  Comments: No conversational dyspnea  Musculoskeletal:      Right lower leg: No edema  Left lower leg: No edema  Skin:     General: Skin is dry  Coloration: Skin is not pale  Comments: Patchy areas of dry, scales on bilateral thighs and arms   Neurological:      Mental Status: She is alert and oriented to person, place, and time  Motor: Motor function is intact     Psychiatric:         Attention and Perception: Attention normal          Mood and Affect: Mood normal          Speech: Speech normal          Behavior: Behavior is cooperative  I spent 30 minutes with patient today in which greater than 50% of the time was spent in counseling/coordination of care regarding assessment of medical conditions, medication counseling, plan and counseling with patient and family  VIRTUAL VISIT DISCLAIMER      Bhavna Al verbally agrees to participate in [employer]  Pt is aware that [employer] could be limited without vital signs or the ability to perform a full hands-on physical [address] understands she or the provider may request at any time to terminate the video visit and request the patient to seek care or treatment in person

## 2022-04-21 NOTE — TELEPHONE ENCOUNTER
No I do not have her scheduled and have patients this afternoon  Please schedule her though for available time, ok for virtual but prefer in person

## 2022-04-21 NOTE — ASSESSMENT & PLAN NOTE
-reports overnight relief of back pain with gabapentin 300mg qhs but pain returns in AM   Therefore will add 100mg in AM, dose will be limited by tolerance of medication

## 2022-05-16 ENCOUNTER — TELEPHONE (OUTPATIENT)
Dept: INTERNAL MEDICINE CLINIC | Facility: CLINIC | Age: 72
End: 2022-05-16

## 2022-05-16 NOTE — TELEPHONE ENCOUNTER
I have not prescribed her a cough syrup with codeine in well over a year, will need appt as it contains a controlled medication

## 2022-05-16 NOTE — TELEPHONE ENCOUNTER
Pt called and left a VM  She stated shes been having a cough X 1 week  She just wants you to prescribe her the usual cough med she gets  She has no other symptoms

## 2022-05-26 DIAGNOSIS — I48.91 ATRIAL FIBRILLATION, NEW ONSET (HCC): ICD-10-CM

## 2022-05-26 RX ORDER — POTASSIUM CHLORIDE 750 MG/1
10 TABLET, EXTENDED RELEASE ORAL DAILY
Qty: 90 TABLET | Refills: 0 | Status: SHIPPED | OUTPATIENT
Start: 2022-05-26

## 2022-06-06 ENCOUNTER — VBI (OUTPATIENT)
Dept: ADMINISTRATIVE | Facility: OTHER | Age: 72
End: 2022-06-06

## 2022-06-07 DIAGNOSIS — I48.0 PAROXYSMAL ATRIAL FIBRILLATION (HCC): ICD-10-CM

## 2022-06-07 DIAGNOSIS — I48.91 NEW ONSET A-FIB (HCC): ICD-10-CM

## 2022-07-05 DIAGNOSIS — M54.16 LUMBAR RADICULOPATHY: ICD-10-CM

## 2022-07-05 RX ORDER — GABAPENTIN 100 MG/1
CAPSULE ORAL
Qty: 120 CAPSULE | Refills: 1 | Status: SHIPPED | OUTPATIENT
Start: 2022-07-05 | End: 2022-09-23 | Stop reason: SDUPTHER

## 2022-07-10 ENCOUNTER — HOSPITAL ENCOUNTER (INPATIENT)
Facility: HOSPITAL | Age: 72
LOS: 2 days | Discharge: HOME HEALTH | DRG: 872 | End: 2022-07-12
Attending: EMERGENCY MEDICINE | Admitting: INTERNAL MEDICINE
Payer: COMMERCIAL

## 2022-07-10 ENCOUNTER — APPOINTMENT (EMERGENCY)
Dept: RADIOLOGY | Facility: HOSPITAL | Age: 72
DRG: 872 | End: 2022-07-10
Payer: COMMERCIAL

## 2022-07-10 DIAGNOSIS — E87.5 HYPERKALEMIA: ICD-10-CM

## 2022-07-10 DIAGNOSIS — M79.605 ACUTE LEG PAIN, LEFT: ICD-10-CM

## 2022-07-10 DIAGNOSIS — L40.1 PUSTULAR PSORIASIS: ICD-10-CM

## 2022-07-10 DIAGNOSIS — G93.40 ACUTE ENCEPHALOPATHY: ICD-10-CM

## 2022-07-10 DIAGNOSIS — R65.20 SEVERE SEPSIS (HCC): Primary | ICD-10-CM

## 2022-07-10 DIAGNOSIS — R50.9 FEVER: ICD-10-CM

## 2022-07-10 DIAGNOSIS — A41.9 SEVERE SEPSIS (HCC): Primary | ICD-10-CM

## 2022-07-10 DIAGNOSIS — E86.0 DEHYDRATION: ICD-10-CM

## 2022-07-10 DIAGNOSIS — N17.9 AKI (ACUTE KIDNEY INJURY) (HCC): ICD-10-CM

## 2022-07-10 DIAGNOSIS — R21 RASH: ICD-10-CM

## 2022-07-10 DIAGNOSIS — H57.02 ANISOCORIA: ICD-10-CM

## 2022-07-10 DIAGNOSIS — R10.9 ACUTE ABDOMINAL PAIN: ICD-10-CM

## 2022-07-10 PROBLEM — L08.9 SEPSIS DUE TO SKIN INFECTION (HCC): Status: ACTIVE | Noted: 2022-01-10

## 2022-07-10 LAB
ALBUMIN SERPL BCP-MCNC: 2.8 G/DL (ref 3.5–5)
ALP SERPL-CCNC: 60 U/L (ref 46–116)
ALT SERPL W P-5'-P-CCNC: 29 U/L (ref 12–78)
ANION GAP SERPL CALCULATED.3IONS-SCNC: 9 MMOL/L (ref 4–13)
APTT PPP: 33 SECONDS (ref 23–37)
AST SERPL W P-5'-P-CCNC: 40 U/L (ref 5–45)
ATRIAL RATE: 220 BPM
BACTERIA UR QL AUTO: ABNORMAL /HPF
BASOPHILS # BLD AUTO: 0.02 THOUSANDS/ΜL (ref 0–0.1)
BASOPHILS NFR BLD AUTO: 0 % (ref 0–1)
BILIRUB SERPL-MCNC: 0.95 MG/DL (ref 0.2–1)
BILIRUB UR QL STRIP: NEGATIVE
BUN SERPL-MCNC: 20 MG/DL (ref 5–25)
CALCIUM ALBUM COR SERPL-MCNC: 10.4 MG/DL (ref 8.3–10.1)
CALCIUM SERPL-MCNC: 9.4 MG/DL (ref 8.3–10.1)
CARDIAC TROPONIN I PNL SERPL HS: 100 NG/L
CHLORIDE SERPL-SCNC: 108 MMOL/L (ref 100–108)
CK MB SERPL-MCNC: 1.1 % (ref 0–2.5)
CK MB SERPL-MCNC: 3.8 NG/ML (ref 0–5)
CK SERPL-CCNC: 346 U/L (ref 26–192)
CLARITY UR: CLEAR
CO2 SERPL-SCNC: 22 MMOL/L (ref 21–32)
COLOR UR: YELLOW
CREAT SERPL-MCNC: 1.44 MG/DL (ref 0.6–1.3)
EOSINOPHIL # BLD AUTO: 0.17 THOUSAND/ΜL (ref 0–0.61)
EOSINOPHIL NFR BLD AUTO: 1 % (ref 0–6)
ERYTHROCYTE [DISTWIDTH] IN BLOOD BY AUTOMATED COUNT: 14.5 % (ref 11.6–15.1)
FLUAV RNA RESP QL NAA+PROBE: NEGATIVE
FLUBV RNA RESP QL NAA+PROBE: NEGATIVE
GFR SERPL CREATININE-BSD FRML MDRD: 36 ML/MIN/1.73SQ M
GLUCOSE SERPL-MCNC: 80 MG/DL (ref 65–140)
GLUCOSE UR STRIP-MCNC: NEGATIVE MG/DL
HCT VFR BLD AUTO: 45.7 % (ref 34.8–46.1)
HGB BLD-MCNC: 14.4 G/DL (ref 11.5–15.4)
HGB UR QL STRIP.AUTO: NEGATIVE
HYALINE CASTS #/AREA URNS LPF: ABNORMAL /LPF
IMM GRANULOCYTES # BLD AUTO: 0.06 THOUSAND/UL (ref 0–0.2)
IMM GRANULOCYTES NFR BLD AUTO: 1 % (ref 0–2)
INR PPP: 1.26 (ref 0.84–1.19)
KETONES UR STRIP-MCNC: ABNORMAL MG/DL
LACTATE SERPL-SCNC: 3.9 MMOL/L (ref 0.5–2)
LEUKOCYTE ESTERASE UR QL STRIP: NEGATIVE
LIPASE SERPL-CCNC: 47 U/L (ref 73–393)
LYMPHOCYTES # BLD AUTO: 1.93 THOUSANDS/ΜL (ref 0.6–4.47)
LYMPHOCYTES NFR BLD AUTO: 16 % (ref 14–44)
MCH RBC QN AUTO: 28.5 PG (ref 26.8–34.3)
MCHC RBC AUTO-ENTMCNC: 31.5 G/DL (ref 31.4–37.4)
MCV RBC AUTO: 90 FL (ref 82–98)
MONOCYTES # BLD AUTO: 1.04 THOUSAND/ΜL (ref 0.17–1.22)
MONOCYTES NFR BLD AUTO: 9 % (ref 4–12)
NEUTROPHILS # BLD AUTO: 8.79 THOUSANDS/ΜL (ref 1.85–7.62)
NEUTS SEG NFR BLD AUTO: 73 % (ref 43–75)
NITRITE UR QL STRIP: NEGATIVE
NON-SQ EPI CELLS URNS QL MICRO: ABNORMAL /HPF
NRBC BLD AUTO-RTO: 0 /100 WBCS
PH UR STRIP.AUTO: 5 [PH]
PLATELET # BLD AUTO: 203 THOUSANDS/UL (ref 149–390)
PMV BLD AUTO: 12 FL (ref 8.9–12.7)
POTASSIUM SERPL-SCNC: 5.5 MMOL/L (ref 3.5–5.3)
PROCALCITONIN SERPL-MCNC: 0.15 NG/ML
PROT SERPL-MCNC: 7.7 G/DL (ref 6.4–8.2)
PROT UR STRIP-MCNC: ABNORMAL MG/DL
PROTHROMBIN TIME: 15.3 SECONDS (ref 11.6–14.5)
QRS AXIS: 54 DEGREES
QRSD INTERVAL: 76 MS
QT INTERVAL: 314 MS
QTC INTERVAL: 434 MS
RBC # BLD AUTO: 5.06 MILLION/UL (ref 3.81–5.12)
RBC #/AREA URNS AUTO: ABNORMAL /HPF
RSV RNA RESP QL NAA+PROBE: NEGATIVE
SARS-COV-2 RNA RESP QL NAA+PROBE: NEGATIVE
SODIUM SERPL-SCNC: 139 MMOL/L (ref 136–145)
SP GR UR STRIP.AUTO: 1.02 (ref 1–1.03)
T WAVE AXIS: 40 DEGREES
T4 FREE SERPL-MCNC: 1.37 NG/DL (ref 0.76–1.46)
TSH SERPL DL<=0.05 MIU/L-ACNC: 4.97 UIU/ML (ref 0.45–4.5)
UROBILINOGEN UR STRIP-ACNC: <2 MG/DL
VENTRICULAR RATE: 115 BPM
WBC # BLD AUTO: 12.01 THOUSAND/UL (ref 4.31–10.16)
WBC #/AREA URNS AUTO: ABNORMAL /HPF

## 2022-07-10 PROCEDURE — 83605 ASSAY OF LACTIC ACID: CPT | Performed by: EMERGENCY MEDICINE

## 2022-07-10 PROCEDURE — 83605 ASSAY OF LACTIC ACID: CPT | Performed by: INTERNAL MEDICINE

## 2022-07-10 PROCEDURE — 84484 ASSAY OF TROPONIN QUANT: CPT | Performed by: EMERGENCY MEDICINE

## 2022-07-10 PROCEDURE — 36415 COLL VENOUS BLD VENIPUNCTURE: CPT | Performed by: EMERGENCY MEDICINE

## 2022-07-10 PROCEDURE — 96365 THER/PROPH/DIAG IV INF INIT: CPT

## 2022-07-10 PROCEDURE — 74176 CT ABD & PELVIS W/O CONTRAST: CPT

## 2022-07-10 PROCEDURE — 87040 BLOOD CULTURE FOR BACTERIA: CPT | Performed by: EMERGENCY MEDICINE

## 2022-07-10 PROCEDURE — 85730 THROMBOPLASTIN TIME PARTIAL: CPT | Performed by: EMERGENCY MEDICINE

## 2022-07-10 PROCEDURE — 85610 PROTHROMBIN TIME: CPT | Performed by: EMERGENCY MEDICINE

## 2022-07-10 PROCEDURE — 82553 CREATINE MB FRACTION: CPT | Performed by: EMERGENCY MEDICINE

## 2022-07-10 PROCEDURE — 83690 ASSAY OF LIPASE: CPT | Performed by: EMERGENCY MEDICINE

## 2022-07-10 PROCEDURE — 99291 CRITICAL CARE FIRST HOUR: CPT | Performed by: EMERGENCY MEDICINE

## 2022-07-10 PROCEDURE — 80053 COMPREHEN METABOLIC PANEL: CPT | Performed by: EMERGENCY MEDICINE

## 2022-07-10 PROCEDURE — 81001 URINALYSIS AUTO W/SCOPE: CPT | Performed by: EMERGENCY MEDICINE

## 2022-07-10 PROCEDURE — 85025 COMPLETE CBC W/AUTO DIFF WBC: CPT | Performed by: EMERGENCY MEDICINE

## 2022-07-10 PROCEDURE — 0241U HB NFCT DS VIR RESP RNA 4 TRGT: CPT | Performed by: EMERGENCY MEDICINE

## 2022-07-10 PROCEDURE — 84484 ASSAY OF TROPONIN QUANT: CPT | Performed by: INTERNAL MEDICINE

## 2022-07-10 PROCEDURE — 93010 ELECTROCARDIOGRAM REPORT: CPT | Performed by: INTERNAL MEDICINE

## 2022-07-10 PROCEDURE — 96367 TX/PROPH/DG ADDL SEQ IV INF: CPT

## 2022-07-10 PROCEDURE — 70450 CT HEAD/BRAIN W/O DYE: CPT

## 2022-07-10 PROCEDURE — 84145 PROCALCITONIN (PCT): CPT | Performed by: EMERGENCY MEDICINE

## 2022-07-10 PROCEDURE — 84439 ASSAY OF FREE THYROXINE: CPT | Performed by: EMERGENCY MEDICINE

## 2022-07-10 PROCEDURE — 82550 ASSAY OF CK (CPK): CPT | Performed by: EMERGENCY MEDICINE

## 2022-07-10 PROCEDURE — G1004 CDSM NDSC: HCPCS

## 2022-07-10 PROCEDURE — 84443 ASSAY THYROID STIM HORMONE: CPT | Performed by: EMERGENCY MEDICINE

## 2022-07-10 PROCEDURE — 99285 EMERGENCY DEPT VISIT HI MDM: CPT

## 2022-07-10 PROCEDURE — 99223 1ST HOSP IP/OBS HIGH 75: CPT | Performed by: INTERNAL MEDICINE

## 2022-07-10 PROCEDURE — 93005 ELECTROCARDIOGRAM TRACING: CPT

## 2022-07-10 PROCEDURE — 71250 CT THORAX DX C-: CPT

## 2022-07-10 RX ORDER — ONDANSETRON 2 MG/ML
4 INJECTION INTRAMUSCULAR; INTRAVENOUS EVERY 6 HOURS PRN
Status: DISCONTINUED | OUTPATIENT
Start: 2022-07-10 | End: 2022-07-12 | Stop reason: HOSPADM

## 2022-07-10 RX ORDER — METHYLPREDNISOLONE SODIUM SUCCINATE 125 MG/2ML
60 INJECTION, POWDER, LYOPHILIZED, FOR SOLUTION INTRAMUSCULAR; INTRAVENOUS ONCE
Status: COMPLETED | OUTPATIENT
Start: 2022-07-10 | End: 2022-07-11

## 2022-07-10 RX ORDER — PREDNISONE 1 MG/1
5 TABLET ORAL DAILY
Status: DISCONTINUED | OUTPATIENT
Start: 2022-07-11 | End: 2022-07-10

## 2022-07-10 RX ORDER — CLOBETASOL PROPIONATE 0.5 MG/G
CREAM TOPICAL 2 TIMES DAILY
Status: DISCONTINUED | OUTPATIENT
Start: 2022-07-11 | End: 2022-07-12 | Stop reason: HOSPADM

## 2022-07-10 RX ORDER — HYDROXYCHLOROQUINE SULFATE 200 MG/1
200 TABLET, FILM COATED ORAL 2 TIMES DAILY
Status: DISCONTINUED | OUTPATIENT
Start: 2022-07-11 | End: 2022-07-12 | Stop reason: HOSPADM

## 2022-07-10 RX ORDER — PREDNISONE 1 MG/1
5 TABLET ORAL 2 TIMES DAILY WITH MEALS
Status: DISCONTINUED | OUTPATIENT
Start: 2022-07-10 | End: 2022-07-12

## 2022-07-10 RX ORDER — ACETAMINOPHEN 325 MG/1
650 TABLET ORAL EVERY 6 HOURS PRN
Status: DISCONTINUED | OUTPATIENT
Start: 2022-07-10 | End: 2022-07-12 | Stop reason: HOSPADM

## 2022-07-10 RX ORDER — MELATONIN
1000 DAILY
Status: DISCONTINUED | OUTPATIENT
Start: 2022-07-11 | End: 2022-07-12 | Stop reason: HOSPADM

## 2022-07-10 RX ORDER — SODIUM CHLORIDE, SODIUM GLUCONATE, SODIUM ACETATE, POTASSIUM CHLORIDE, MAGNESIUM CHLORIDE, SODIUM PHOSPHATE, DIBASIC, AND POTASSIUM PHOSPHATE .53; .5; .37; .037; .03; .012; .00082 G/100ML; G/100ML; G/100ML; G/100ML; G/100ML; G/100ML; G/100ML
1000 INJECTION, SOLUTION INTRAVENOUS ONCE
Status: COMPLETED | OUTPATIENT
Start: 2022-07-10 | End: 2022-07-10

## 2022-07-10 RX ORDER — GABAPENTIN 100 MG/1
100 CAPSULE ORAL DAILY
Status: DISCONTINUED | OUTPATIENT
Start: 2022-07-11 | End: 2022-07-12 | Stop reason: HOSPADM

## 2022-07-10 RX ORDER — PANTOPRAZOLE SODIUM 20 MG/1
20 TABLET, DELAYED RELEASE ORAL
Status: DISCONTINUED | OUTPATIENT
Start: 2022-07-11 | End: 2022-07-12 | Stop reason: HOSPADM

## 2022-07-10 RX ORDER — ACETAMINOPHEN 325 MG/1
650 TABLET ORAL ONCE
Status: COMPLETED | OUTPATIENT
Start: 2022-07-10 | End: 2022-07-10

## 2022-07-10 RX ORDER — LANOLIN ALCOHOL/MO/W.PET/CERES
CREAM (GRAM) TOPICAL 3 TIMES DAILY
Status: DISCONTINUED | OUTPATIENT
Start: 2022-07-10 | End: 2022-07-12 | Stop reason: HOSPADM

## 2022-07-10 RX ORDER — GABAPENTIN 300 MG/1
300 CAPSULE ORAL
Status: DISCONTINUED | OUTPATIENT
Start: 2022-07-10 | End: 2022-07-12 | Stop reason: HOSPADM

## 2022-07-10 RX ORDER — CLOTRIMAZOLE 1 %
CREAM (GRAM) TOPICAL 2 TIMES DAILY
Refills: 0 | Status: DISCONTINUED | OUTPATIENT
Start: 2022-07-11 | End: 2022-07-12 | Stop reason: HOSPADM

## 2022-07-10 RX ADMIN — SODIUM CHLORIDE 1000 ML: 0.9 INJECTION, SOLUTION INTRAVENOUS at 22:24

## 2022-07-10 RX ADMIN — ACETAMINOPHEN 650 MG: 325 TABLET, FILM COATED ORAL at 23:40

## 2022-07-10 RX ADMIN — SODIUM CHLORIDE 1000 ML: 0.9 INJECTION, SOLUTION INTRAVENOUS at 23:49

## 2022-07-10 RX ADMIN — METHYLPREDNISOLONE SODIUM SUCCINATE 60 MG: 125 INJECTION, POWDER, FOR SOLUTION INTRAMUSCULAR; INTRAVENOUS at 23:59

## 2022-07-10 RX ADMIN — GABAPENTIN 300 MG: 300 CAPSULE ORAL at 23:59

## 2022-07-10 RX ADMIN — METOPROLOL TARTRATE 25 MG: 25 TABLET, FILM COATED ORAL at 23:40

## 2022-07-10 RX ADMIN — SODIUM CHLORIDE 1000 ML: 0.9 INJECTION, SOLUTION INTRAVENOUS at 23:35

## 2022-07-10 RX ADMIN — SODIUM CHLORIDE, SODIUM GLUCONATE, SODIUM ACETATE, POTASSIUM CHLORIDE, MAGNESIUM CHLORIDE, SODIUM PHOSPHATE, DIBASIC, AND POTASSIUM PHOSPHATE 1000 ML: .53; .5; .37; .037; .03; .012; .00082 INJECTION, SOLUTION INTRAVENOUS at 21:03

## 2022-07-10 RX ADMIN — CEFEPIME HYDROCHLORIDE 2000 MG: 2 INJECTION, POWDER, FOR SOLUTION INTRAVENOUS at 21:44

## 2022-07-10 RX ADMIN — VANCOMYCIN HYDROCHLORIDE 1500 MG: 10 INJECTION, POWDER, LYOPHILIZED, FOR SOLUTION INTRAVENOUS at 22:00

## 2022-07-10 RX ADMIN — PREDNISONE 5 MG: 5 TABLET ORAL at 23:59

## 2022-07-10 RX ADMIN — ACETAMINOPHEN 325 MG: 325 SUPPOSITORY RECTAL at 21:44

## 2022-07-11 ENCOUNTER — APPOINTMENT (INPATIENT)
Dept: NON INVASIVE DIAGNOSTICS | Facility: HOSPITAL | Age: 72
DRG: 872 | End: 2022-07-11
Payer: COMMERCIAL

## 2022-07-11 DIAGNOSIS — I48.91 NEW ONSET A-FIB (HCC): ICD-10-CM

## 2022-07-11 PROBLEM — R93.89 ABNORMAL CT OF THE CHEST: Status: ACTIVE | Noted: 2022-07-11

## 2022-07-11 LAB
2HR DELTA HS TROPONIN: 4 NG/L
4HR DELTA HS TROPONIN: 33 NG/L
ALBUMIN SERPL BCP-MCNC: 2.4 G/DL (ref 3.5–5)
ALP SERPL-CCNC: 52 U/L (ref 46–116)
ALT SERPL W P-5'-P-CCNC: 26 U/L (ref 12–78)
ANION GAP SERPL CALCULATED.3IONS-SCNC: 12 MMOL/L (ref 4–13)
AST SERPL W P-5'-P-CCNC: 34 U/L (ref 5–45)
BASOPHILS # BLD AUTO: 0.01 THOUSANDS/ΜL (ref 0–0.1)
BASOPHILS NFR BLD AUTO: 0 % (ref 0–1)
BILIRUB SERPL-MCNC: 1.48 MG/DL (ref 0.2–1)
BUN SERPL-MCNC: 19 MG/DL (ref 5–25)
CALCIUM ALBUM COR SERPL-MCNC: 9.8 MG/DL (ref 8.3–10.1)
CALCIUM SERPL-MCNC: 8.5 MG/DL (ref 8.3–10.1)
CARDIAC TROPONIN I PNL SERPL HS: 104 NG/L
CARDIAC TROPONIN I PNL SERPL HS: 133 NG/L
CHLORIDE SERPL-SCNC: 113 MMOL/L (ref 100–108)
CO2 SERPL-SCNC: 17 MMOL/L (ref 21–32)
CREAT SERPL-MCNC: 1.26 MG/DL (ref 0.6–1.3)
EOSINOPHIL # BLD AUTO: 0.01 THOUSAND/ΜL (ref 0–0.61)
EOSINOPHIL NFR BLD AUTO: 0 % (ref 0–6)
ERYTHROCYTE [DISTWIDTH] IN BLOOD BY AUTOMATED COUNT: 14.3 % (ref 11.6–15.1)
GFR SERPL CREATININE-BSD FRML MDRD: 42 ML/MIN/1.73SQ M
GLUCOSE SERPL-MCNC: 78 MG/DL (ref 65–140)
HCT VFR BLD AUTO: 44.3 % (ref 34.8–46.1)
HGB BLD-MCNC: 13.5 G/DL (ref 11.5–15.4)
IMM GRANULOCYTES # BLD AUTO: 0.04 THOUSAND/UL (ref 0–0.2)
IMM GRANULOCYTES NFR BLD AUTO: 0 % (ref 0–2)
LACTATE SERPL-SCNC: 1.5 MMOL/L (ref 0.5–2)
LACTATE SERPL-SCNC: 2.1 MMOL/L (ref 0.5–2)
LACTATE SERPL-SCNC: 2.7 MMOL/L (ref 0.5–2)
LYMPHOCYTES # BLD AUTO: 0.3 THOUSANDS/ΜL (ref 0.6–4.47)
LYMPHOCYTES NFR BLD AUTO: 3 % (ref 14–44)
MAGNESIUM SERPL-MCNC: 1.7 MG/DL (ref 1.6–2.6)
MCH RBC QN AUTO: 28.5 PG (ref 26.8–34.3)
MCHC RBC AUTO-ENTMCNC: 30.5 G/DL (ref 31.4–37.4)
MCV RBC AUTO: 94 FL (ref 82–98)
MONOCYTES # BLD AUTO: 0.29 THOUSAND/ΜL (ref 0.17–1.22)
MONOCYTES NFR BLD AUTO: 3 % (ref 4–12)
NEUTROPHILS # BLD AUTO: 8.89 THOUSANDS/ΜL (ref 1.85–7.62)
NEUTS SEG NFR BLD AUTO: 94 % (ref 43–75)
NRBC BLD AUTO-RTO: 0 /100 WBCS
PHOSPHATE SERPL-MCNC: 2.5 MG/DL (ref 2.3–4.1)
PLATELET # BLD AUTO: 153 THOUSANDS/UL (ref 149–390)
PMV BLD AUTO: 11.5 FL (ref 8.9–12.7)
POTASSIUM SERPL-SCNC: 4.5 MMOL/L (ref 3.5–5.3)
PROCALCITONIN SERPL-MCNC: 0.53 NG/ML
PROT SERPL-MCNC: 6.6 G/DL (ref 6.4–8.2)
RBC # BLD AUTO: 4.73 MILLION/UL (ref 3.81–5.12)
SODIUM SERPL-SCNC: 142 MMOL/L (ref 136–145)
WBC # BLD AUTO: 9.54 THOUSAND/UL (ref 4.31–10.16)

## 2022-07-11 PROCEDURE — 83605 ASSAY OF LACTIC ACID: CPT | Performed by: INTERNAL MEDICINE

## 2022-07-11 PROCEDURE — 99223 1ST HOSP IP/OBS HIGH 75: CPT | Performed by: INTERNAL MEDICINE

## 2022-07-11 PROCEDURE — 80053 COMPREHEN METABOLIC PANEL: CPT | Performed by: INTERNAL MEDICINE

## 2022-07-11 PROCEDURE — 93970 EXTREMITY STUDY: CPT

## 2022-07-11 PROCEDURE — 93970 EXTREMITY STUDY: CPT | Performed by: SURGERY

## 2022-07-11 PROCEDURE — 84100 ASSAY OF PHOSPHORUS: CPT | Performed by: INTERNAL MEDICINE

## 2022-07-11 PROCEDURE — 84484 ASSAY OF TROPONIN QUANT: CPT | Performed by: INTERNAL MEDICINE

## 2022-07-11 PROCEDURE — 99232 SBSQ HOSP IP/OBS MODERATE 35: CPT | Performed by: NURSE PRACTITIONER

## 2022-07-11 PROCEDURE — 36415 COLL VENOUS BLD VENIPUNCTURE: CPT | Performed by: INTERNAL MEDICINE

## 2022-07-11 PROCEDURE — 83735 ASSAY OF MAGNESIUM: CPT | Performed by: INTERNAL MEDICINE

## 2022-07-11 PROCEDURE — 85025 COMPLETE CBC W/AUTO DIFF WBC: CPT | Performed by: INTERNAL MEDICINE

## 2022-07-11 PROCEDURE — 84145 PROCALCITONIN (PCT): CPT | Performed by: INTERNAL MEDICINE

## 2022-07-11 RX ADMIN — GABAPENTIN 100 MG: 100 CAPSULE ORAL at 08:29

## 2022-07-11 RX ADMIN — Medication 1000 UNITS: at 08:30

## 2022-07-11 RX ADMIN — HYDROXYCHLOROQUINE SULFATE 200 MG: 200 TABLET ORAL at 18:40

## 2022-07-11 RX ADMIN — APIXABAN 5 MG: 5 TABLET, FILM COATED ORAL at 18:39

## 2022-07-11 RX ADMIN — METOPROLOL TARTRATE 25 MG: 25 TABLET, FILM COATED ORAL at 22:10

## 2022-07-11 RX ADMIN — Medication: at 00:07

## 2022-07-11 RX ADMIN — CLOTRIMAZOLE: 1 CREAM TOPICAL at 08:30

## 2022-07-11 RX ADMIN — PREDNISONE 5 MG: 5 TABLET ORAL at 18:39

## 2022-07-11 RX ADMIN — HYDROXYCHLOROQUINE SULFATE 200 MG: 200 TABLET ORAL at 08:30

## 2022-07-11 RX ADMIN — Medication: at 18:40

## 2022-07-11 RX ADMIN — Medication: at 08:31

## 2022-07-11 RX ADMIN — PREDNISONE 5 MG: 5 TABLET ORAL at 08:29

## 2022-07-11 RX ADMIN — CEFEPIME HYDROCHLORIDE 2000 MG: 2 INJECTION, POWDER, FOR SOLUTION INTRAVENOUS at 14:02

## 2022-07-11 RX ADMIN — CLOBETASOL PROPIONATE: 0.5 CREAM TOPICAL at 08:30

## 2022-07-11 RX ADMIN — CLOTRIMAZOLE: 1 CREAM TOPICAL at 18:40

## 2022-07-11 RX ADMIN — METOPROLOL TARTRATE 25 MG: 25 TABLET, FILM COATED ORAL at 08:30

## 2022-07-11 RX ADMIN — PANTOPRAZOLE SODIUM 20 MG: 20 TABLET, DELAYED RELEASE ORAL at 06:26

## 2022-07-11 RX ADMIN — Medication: at 22:12

## 2022-07-11 RX ADMIN — GABAPENTIN 300 MG: 300 CAPSULE ORAL at 22:12

## 2022-07-11 RX ADMIN — ACETAMINOPHEN 650 MG: 325 TABLET, FILM COATED ORAL at 22:12

## 2022-07-11 RX ADMIN — CLOBETASOL PROPIONATE: 0.5 CREAM TOPICAL at 18:40

## 2022-07-11 RX ADMIN — APIXABAN 5 MG: 5 TABLET, FILM COATED ORAL at 08:30

## 2022-07-11 NOTE — PROGRESS NOTES
Vancomycin Assessment    Bhavna Oneill is a 66 y o  female who is currently receiving vancomycin 1500mg IV loading dose for skin-soft tissue infection     Relevant clinical data and objective history reviewed:  Creatinine   Date Value Ref Range Status   07/10/2022 1 44 (H) 0 60 - 1 30 mg/dL Final     Comment:     Standardized to IDMS reference method   03/15/2022 0 98 0 60 - 1 30 mg/dL Final     Comment:     Standardized to IDMS reference method   03/14/2022 0 92 0 60 - 1 30 mg/dL Final     Comment:     Standardized to IDMS reference method     BP 99/50   Pulse 88   Temp (!) 100 9 °F (38 3 °C) (Tympanic)   Resp (!) 29   SpO2 99%   No intake/output data recorded  Lab Results   Component Value Date/Time    BUN 20 07/10/2022 08:59 PM    WBC 12 01 (H) 07/10/2022 08:59 PM    HGB 14 4 07/10/2022 08:59 PM    HCT 45 7 07/10/2022 08:59 PM    MCV 90 07/10/2022 08:59 PM     07/10/2022 08:59 PM     Temp Readings from Last 3 Encounters:   07/11/22 (!) 100 9 °F (38 3 °C) (Tympanic)   03/14/22 98 2 °F (36 8 °C)   01/26/22 98 4 °F (36 9 °C)     Vancomycin Days of Therapy: 1    Assessment/Plan  The patient is currently on vancomycin utilizing scheduled dosing based on adjusted body weight (due to obesity)  Baseline risks associated with therapy include: pre-existing renal impairment and advanced age  The patient is currently receiving 1500mg IV loading dose and after clinical evaluation will be changed to 1250mg IV q24h  Pharmacy will also follow closely for s/sx of nephrotoxicity, infusion reactions, and appropriateness of therapy  BMP and CBC will be ordered per protocol  Plan for trough as patient approaches steady state, prior to the 4th  dose at approximately 2130 on 7/13  Due to infection severity, will target a trough of 10-15 (mild infection/cellulitis)   Pharmacy will continue to follow the patients culture results and clinical progress daily      Chuy Chahal, Pharmacist

## 2022-07-11 NOTE — ED PROVIDER NOTES
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 [E86 0] Dehydration     7/10/2022  9:14 PM Santa Sat A Add [M79 605] Acute leg pain, left     7/10/2022  9:14 PM Santa Sat A Add [R50 9] Fever     7/10/2022  9:14 PM Santa Sat A Add [R21] Rash     7/10/2022  9:41 PM Santa Sat A Add [E87 5] Hyperkalemia     7/10/2022  9:41 PM [place], [place] Stable A Add [N17 9] AMANDA (acute kidney injury) ([place] [address] )     7/10/2022 10:14 PM [place], [place] Stable A Add [A41 9,  R65 20] Severe sepsis ([facility] )     7/10/2022 10:14 PM Santa Sat A Modify [G93 40] Acute encephalopathy     7/10/2022 10:14 PM Santa Sat A Modify [A41 9,  R65 20] Severe sepsis ([facility] )     7/10/2022 10:28 PM [place] 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       ED Disposition     ED Disposition   Admit    Condition   Stable    Date/Time   Sun Jul 10, 2022 10:23 PM    Comment   Case was discussed with XENIA and the patient's admission status was agreed to be Admission Status: inpatient status to the service of Dr Brea Matias              Follow-up Information    None         Patient's Medications   Discharge Prescriptions    No medications on file     No discharge procedures on file  PDMP Review       Value Time User    PDMP Reviewed  Yes 5/16/2022  4:17 PM Nya Ellis DO           ED Provider  Attending physically available and evaluated [facility]  I managed the patient along with the ED Attending      Electronically Signed by         Chad Llanos MD  07/10/22 6007

## 2022-07-11 NOTE — ASSESSMENT & PLAN NOTE
Nodular opacity in the right lower lobe without a correlate on the prior study may represent a vessel or nodule  · Follow up chest CT is advised in 6-12 months

## 2022-07-11 NOTE — ASSESSMENT & PLAN NOTE
Elevated troponin is also found in previous episode of sepsis due to skin infection  May be secondary to sepsis and acute renal failure

## 2022-07-11 NOTE — H&P
[facility] 1950, 66 [facility]  [facility] MRN: [id number]  Unit/Bed#: ED 14 Encounter: [id number]  Primary Care Provider: Madyson Schmidt DO   Date and time admitted to hospital: 7/10/2022  8:07 PM    * Sepsis due to skin infection [facility])  Assessment & Plan  Urine is clean  Chest x-ray unremarkable  Most likely skin is portal of entry secondary to flare of posterior psoriasis and dermatitis  Continue cefepime with vancomycin  Infectious disease consult  Dermatology consult  Rheumatology consult  Continue prednisone 5 mg twice daily; will give a dose of Solu-Medrol 60 mg x 1 to tame inflammation right now  Patient also on Plaquenil  Continue emollients and steroid creams  CBC with differential, metabolic profile, lactic acid and procalcitonin to follow  Acute renal failure (ARF) (HCC)  Assessment & Plan  Secondary to sepsis  Put on hold diuretics  Sepsis fluids  Recheck metabolic profile in the morning  Pustular psoriasis  Assessment & Plan  Would get the opinion of dermatology and rheumatology  Continue prednisone 5 mg twice daily  Continue emollient creams and moisturizers  Continue steroid cream     Elevated troponin  Assessment & Plan  Elevated troponin is also found in previous episode of sepsis due to skin infection  May be secondary to sepsis and acute renal failure  Paroxysmal atrial fibrillation (HCC)  Assessment & Plan  On metoprolol tartrate  Vitamin D deficiency  Assessment & Plan  Continue cholecalciferol  Benign essential hypertension  Assessment & Plan  On metoprolol tartrate  Put on hold hydrochlorothiazide  GERD (gastroesophageal reflux disease)  Assessment & Plan  Continue pantoprazole  Rheumatoid arthritis [facility])  Assessment & Plan  Will get opinion of Rheumatology            VTE Prophylaxis: Apixaban (Eliquis)  / sequential compression device   Code Status: Level 1 - Full Code as discussed with daughter and granddaughter  POLST: There is no POLST form on file for this patient (pre-hospital)    Anticipated Length of Stay:  Patient will be admitted on an Inpatient basis with an anticipated length of stay of  greater than 2 midnights  Justification for Hospital Stay: Please see detailed plans noted above  Chief Complaint:     Fever and lethargy  History of Present Illness:  Jeanine Hunter is a 66 y o  female who has past medical history significant for morbid obesity, benign essential hypertension, atrial fibrillation, chronic anticoagulation, pustular psoriasis, rheumatoid arthritis, vitamin-D deficiency and gastroesophageal reflux disease  Noted is that the patient has a previous history similar to current presentation of sepsis secondary to dermatitis being the portal of entry  The patient was doing quite well until approximately a week ago when she ran out of prednisone  Unfortunately, her prednisone comes as a mail order supply  She was able to get a month supply of prednisone 5 mg tablets however numbering only to 30, it was able to last her for just 2 weeks  Hence, the patient has no supply since a week ago, this led to a flare up  Also, she is taking care of herself and her daughter, granddaughter both work  Her granddaughter works in childcare and although there are children who may be sick with strep throat, the granddaughter has been asymptomatic and without any systemic complaints  Her daughter works in [employer] and is not sick either  The patient has been having increased lethargy and tiredness for the past 1 and half day  She came to the emergency room febrile  She has an lingering cough  No nausea or vomiting  Poor appetite  Reportedly there was 1 loose stool prior to coming to [employer] [facility] Emergency  Currently, patient is feeling much better and is responsive when spoken to  She was given 2 L of fluids here in the emergency room      Review of Systems:    Constitutional:  There was fever this evening; some complaints of chills but she was also near the air conditioning unit  Eyes:  Denies change in visual acuity   HENT:  Denies nasal congestion or sore throat   Respiratory:  Denies cough or shortness of breath   Cardiovascular:  Denies chest pain or edema   GI:  Denies abdominal pain, nausea, vomiting, bloody stools or diarrhea   :  Denies dysuria   Musculoskeletal:  Denies back pain or joint pain   Integument:  Rash with redness and increased dryness of skin affecting her face neck torso back upper and lower extremities  Neurologic:  Denies headache, focal weakness or sensory changes   Endocrine:  Denies polyuria or polydipsia   Lymphatic:  Denies swollen glands   Psychiatric:  Denies depression or anxiety     Past Medical and Surgical History:   Past Medical History:   Diagnosis Date    Abnormal thyroid function test     last assessed: 2015     Arthritis     Caries     last assessed: 2016     Continuous opioid dependence [facility]) 2021    Edema of right lower extremity     last assessed: 2015     GERD (gastroesophageal reflux disease)     Hypertension     Medicare annual wellness visit, subsequent 2021    Positive blood culture 3/11/2022    Sarcoid      Past Surgical History:   Procedure Laterality Date     SECTION      MULTIPLE TOOTH EXTRACTIONS N/A 2016    Procedure: Surgical extraction of teeth 2, 18, 19, 30, 31; incision and drainage of left subperiosteal abscess ;  Surgeon: Kwaku Chew DMD;  Location: [facility];  Service:        Meds/Allergies:  (Not in a hospital admission)      Allergies:    Allergies   Allergen Reactions    Methotrexate Derivatives     Shellfish-Derived Products - Food Allergy Itching    Amoxicillin Rash    Ampicillin-Sulbactam Sodium Rash     History:  Marital Status: Single   Occupation:   Patient Pre-hospital Living Situation:  Lives at home  Patient Pre-hospital Level of Mobility: Ambulatory  Patient Pre-hospital Diet Restrictions:  Regular  Substance Use History:   Social History     Substance and Sexual Activity   Alcohol Use No     Social History     Tobacco Use   Smoking Status Never Smoker   Smokeless Tobacco Never Used     Social History     Substance and Sexual Activity   Drug Use No       Family History:  Family History   Problem Relation Age of Onset    Asthma Mother     Stroke Mother     No Known Problems Father     No Known Problems Sister     No Known Problems Brother     No Known Problems Maternal Grandmother     No Known Problems Maternal Grandfather     No Known Problems Paternal Grandmother     No Known Problems Paternal Grandfather     Diabetes Maternal Aunt     Breast cancer Cousin     Diabetes Cousin     Breast cancer Other        Physical Exam:     Vitals:   Blood Pressure: 144/60 (07/10/22 2215)  Pulse: (!) 114 (07/10/22 2215)  Temperature: (!) 102 1 °F (38 9 °C) (07/10/22 2224)  Temp Source: Rectal (07/10/22 2224)  Respirations: (!) 28 (07/10/22 2215)  SpO2: 93 % (07/10/22 2215)    Constitutional:  Well developed, well nourished, obese habitus, no acute distress, non-toxic appearance   Eyes:  PERRL, conjunctiva normal   HENT:  Atraumatic, external ears normal, nose normal, oropharynx moist, no pharyngeal exudates  Neck- normal range of motion, no tenderness, supple   Respiratory:  No respiratory distress, normal breath sounds, no rales, no wheezing   Cardiovascular:  Normal rate, normal rhythm, no murmurs, no gallops, no rubs   GI:  Soft, nondistended, normal bowel sounds, nontender, no organomegaly, no mass, no rebound, no guarding   :  No costovertebral angle tenderness   Musculoskeletal:  No edema, no tenderness, no deformities  Back- no tenderness  Integument:  Well hydrated, generalized erythematous rash with skin dryness involving the face, neck, torso, upper and lower extremities    Lymphatic:  No lymphadenopathy noted   Neurologic:  Although generalized weak, she is alert, responsive communicative, CN 2-12 normal, normal motor function, normal sensory function, no focal deficits noted   Psychiatric:  Speech and behavior appropriate       Lab Results: I have personally reviewed pertinent reports  Results from last 7 days   Lab Units 07/10/22  2059   WBC Thousand/uL 12 01*   HEMOGLOBIN g/dL 14 4   HEMATOCRIT % 45 7   PLATELETS Thousands/uL 203   NEUTROS PCT % 73   LYMPHS PCT % 16   MONOS PCT % 9   EOS PCT % 1     Results from last 7 days   Lab Units 07/10/22  2059   POTASSIUM mmol/L 5 5*   CHLORIDE mmol/L 108   CO2 mmol/L 22   BUN mg/dL 20   CREATININE mg/dL 1 44*   CALCIUM mg/dL 9 4   ALK PHOS U/L 60   ALT U/L 29   AST U/L 40     Results from last 7 days   Lab Units 07/10/22  2059   INR  1 26*           Imaging: I have personally reviewed pertinent reports  CT of the head, chest abdomen pelvis without contrast are all pending  No results found  ** Please Note: Dragon 360 Dictation voice to text software was used in the creation of this document   **

## 2022-07-11 NOTE — ASSESSMENT & PLAN NOTE
Known to Dr Diana Silva    · Continue Prednisone 5 mg BID and Plaquenil  · Rheumatology consult ordered on admission, pending

## 2022-07-11 NOTE — ASSESSMENT & PLAN NOTE
Urine is clean  Chest x-ray unremarkable  Most likely skin is portal of entry secondary to flare of posterior psoriasis and dermatitis  Continue cefepime with vancomycin  Infectious disease consult  Dermatology consult  Rheumatology consult  Continue prednisone 5 mg twice daily; will give a dose of Solu-Medrol 60 mg x 1 to tame inflammation right now  Patient also on Plaquenil  Continue emollients and steroid creams  CBC with differential, metabolic profile, lactic acid and procalcitonin to follow

## 2022-07-11 NOTE — PLAN OF CARE
Problem: Prexisting or High Potential for Compromised Skin Integrity  Goal: Skin integrity is maintained or improved  Description: INTERVENTIONS:  - Identify patients at risk for skin breakdown  - Assess and monitor skin integrity  - Assess and monitor nutrition and hydration status  - Monitor labs   - Assess for incontinence   - Turn and reposition patient  - Assist with mobility/ambulation  - Relieve pressure over bony prominences  - Avoid friction and shearing  - Provide appropriate hygiene as needed including keeping skin clean and dry  - Evaluate need for skin moisturizer/barrier cream  - Collaborate with interdisciplinary team   - Patient/family teaching  - Consider wound care consult   Outcome: Progressing     Problem: MOBILITY - ADULT  Goal: Maintain or return to baseline ADL function  Description: INTERVENTIONS:  -  Assess patient's ability to carry out ADLs; assess patient's baseline for ADL function and identify physical deficits which impact ability to perform ADLs (bathing, care of mouth/teeth, toileting, grooming, dressing, etc )  - Assess/evaluate cause of self-care deficits   - Assess range of motion  - Assess patient's mobility; develop plan if impaired  - Assess patient's need for assistive devices and provide as appropriate  - Encourage maximum independence but intervene and supervise when necessary  - Involve family in performance of ADLs  - Assess for home care needs following discharge   - Consider OT consult to assist with ADL evaluation and planning for discharge  - Provide patient education as appropriate  Outcome: Progressing  Goal: Maintains/Returns to pre admission functional level  Description: INTERVENTIONS:  - Perform BMAT or MOVE assessment daily    - Set and communicate daily mobility goal to care team and patient/family/caregiver  - Collaborate with rehabilitation services on mobility goals if consulted  - Perform Range of Motion   times a day    - Reposition patient every hours   - Dangle patient   times a day  - Stand patient   times a day  - Ambulate patient   times a day  - Out of bed to chair   times a day   - Out of bed for meals   times a day  - Out of bed for toileting  - Record patient progress and toleration of activity level   Outcome: Progressing     Problem: Potential for Falls  Goal: Patient will remain free of falls  Description: INTERVENTIONS:  - Educate patient/family on patient safety including physical limitations  - Instruct patient to call for assistance with activity   - Consult OT/PT to assist with strengthening/mobility   - Keep Call bell within reach  - Keep bed low and locked with side rails adjusted as appropriate  - Keep care items and personal belongings within reach  - Initiate and maintain comfort rounds  - Make Fall Risk Sign visible to staff  - Offer Toileting every   Hours, in advance of need  - Initiate/Maintain  alarm  - Obtain necessary fall risk management equipment:    - Apply yellow socks and bracelet for high fall risk patients  - Consider moving patient to room near nurses station  Outcome: Progressing     Problem: MOBILITY - ADULT  Goal: Maintain or return to baseline ADL function  Description: INTERVENTIONS:  -  Assess patient's ability to carry out ADLs; assess patient's baseline for ADL function and identify physical deficits which impact ability to perform ADLs (bathing, care of mouth/teeth, toileting, grooming, dressing, etc )  - Assess/evaluate cause of self-care deficits   - Assess range of motion  - Assess patient's mobility; develop plan if impaired  - Assess patient's need for assistive devices and provide as appropriate  - Encourage maximum independence but intervene and supervise when necessary  - Involve family in performance of ADLs  - Assess for home care needs following discharge   - Consider OT consult to assist with ADL evaluation and planning for discharge  - Provide patient education as appropriate  Outcome: Progressing Problem: MOBILITY - ADULT  Goal: Maintains/Returns to pre admission functional level  Description: INTERVENTIONS:  - Perform BMAT or MOVE assessment daily    - Set and communicate daily mobility goal to care team and patient/family/caregiver  - Collaborate with rehabilitation services on mobility goals if consulted  - Perform Range of Motion   times a day  - Reposition patient every   hours    - Dangle patient   times a day  - Stand patient   times a day  - Ambulate patient   times a day  - Out of bed to chair   times a day   - Out of bed for meals times a day  - Out of bed for toileting  - Record patient progress and toleration of activity level   Outcome: Progressing     Problem: Prexisting or High Potential for Compromised Skin Integrity  Goal: Skin integrity is maintained or improved  Description: INTERVENTIONS:  - Identify patients at risk for skin breakdown  - Assess and monitor skin integrity  - Assess and monitor nutrition and hydration status  - Monitor labs   - Assess for incontinence   - Turn and reposition patient  - Assist with mobility/ambulation  - Relieve pressure over bony prominences  - Avoid friction and shearing  - Provide appropriate hygiene as needed including keeping skin clean and dry  - Evaluate need for skin moisturizer/barrier cream  - Collaborate with interdisciplinary team   - Patient/family teaching  - Consider wound care consult   Outcome: Progressing

## 2022-07-11 NOTE — ASSESSMENT & PLAN NOTE
Flat   No chest pain  Likely in setting of sepsis  Historically troponins were elevated with sepsis    · Monitor

## 2022-07-11 NOTE — ED ATTENDING ATTESTATION
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           I, Brenda Luna MD, saw and evaluated the patient  All available labs and X-rays were ordered by me or the resident and have been reviewed by myself  I discussed the patient with the resident / non-physician and agree with the resident's / non-physician practitioner's findings and plan as documented in the resident's / non-physician practicitioner's note, except where noted  At this point, I agree with the current assessment done in the ED  I was present during key portions of all procedures performed unless otherwise stated  Chief Complaint   Patient presents with    Altered Mental Status     BIBA for increasing lethargy and dehydration  Found to be in grossly negligent situation covered in urine and feces  PD reporting concerns to [facility] per EMS     This is a 66 y o  female presenting for evaluation of AMS  The patient normally lives at home with the family but during the day they would be at work so primarily left to her own devices  The patient is non-ambulatory; hasn't seen a doctor in person for about 2 years due to ambulatory dysfunction, unable to go down the stairs  Patient manages her own medications  Fercho Vázquez had a recent diagnosis of AF?? Anyways, was okay until today when family thought that she was more tired lethargic  EMS was called b/c family couldn't move the patient  The patient was covered in urine and feces; there's a petition to department of aging by police per report  Patient cannot provide any hx so it's all from the family at bedside (daughter; granddaughter)       PMH:   has a past medical history of Abnormal thyroid function test, Arthritis, Caries, Continuous opioid dependence ([address] ) (12/2/2021), Edema of right lower extremity, GERD (gastroesophageal reflux disease), Hypertension, Medicare annual wellness visit, subsequent (2021), Positive blood culture (3/11/2022), and Sarcoid  PSH:   has a past surgical history that includes  section and Multiple tooth extractions (N/A, 2016)  Social:  Social History     Substance and Sexual Activity   Alcohol Use No     Social History     Tobacco Use   Smoking Status Never Smoker   Smokeless Tobacco Never Used     Social History     Substance and Sexual Activity   Drug Use No     PE:  Vitals:    22 0700 22 0745 22 1100 22 1500   BP: 136/59  120/59 137/56   BP Location: Left arm  Right arm Right arm   Pulse: 82      Resp: 13      Temp: 97 5 °F (36 4 °C)   98 6 °F (37 °C)   TempSrc: Oral      SpO2: 100% 99%     General: VS reviewed  Appears chronically ill  Obese   Keeps eyes closed  Dry mucous membranes  Appears stated age  Head: Normocephalic, atraumatic  Eyes: EOM-I  No diplopia  No hyphema  No subconjunctival hemorrhages  Symmetrical lids  ENT: Atraumatic external nose and ears  Dry MM  No malocclusion  No stridor  Normal phonation  No drooling  Normal swallowing  Neck: No JVD  CV: Tachycardic   No pallor noted  Lungs:   No tachypnea  No respiratory distress  MSK:   Laying in bed, not really moving  She is extremely tender over the bilateral posterior calves  No comaprtment syndrome but very tender  DVT? She's been very immobile    Skin: Dry, intact  Crusty appearance, covered in stool on her back   Urine soaked clothes  Neuro: awake but just laying there  Psychiatric/Behavioral: can't evaluate    Exam: deferred  A:  - AMS  P:  - Broad workup for acute infectious cause  Feels hot ot hte touch; likely febrile  - Labs  - Fluids  - Urine  - Admission  - CPK given how non-ambulatory she is  Myositis? DVT? Duplex for that  - Pain control PRN  - TSH given obesity, and likely non-compliance       - 13 point ROS was performed and all are normal unless stated in the history above  - Nursing note reviewed  Vitals reviewed  - Orders placed by myself and/or advanced practitioner / resident     - Previous chart was reviewed  - No language barrier    - History obtained from patient, family, EMS, police  - There are limitations to the history obtained: AMS/encephalopathy   - Critical care time: 34 minutes  - Critical care time was exclusive of seperately bilable procedures and treating other patients as well as teaching time     - Critical care was necessary to treat or prevent imminent or life-threatening deterioration of the following condition: AMS  - Critical care time was spent personally by me on the following activities as well as the above as per the ED course and rest of chart: blood draw for specimens, obtaining history from patient / surrogate, developement of a treatment plan, discussions with consultants, evaluation of patient's response to the treatment, examination of the patient, ordering/performing treatements and interventions, re-evaluation of the patient's condition, review of old charts, ordering/reviewing laboratory studies, ordering/reviewing of radiographic studies    Code Status: Level 1 - Full Code  Advance Directive and Living Will:      Power of :    POLST:      Medications   apixaban (ELIQUIS) tablet 5 mg (5 mg Oral Given 7/11/22 0830)   metoprolol tartrate (LOPRESSOR) tablet 25 mg (25 mg Oral Given 7/11/22 0830)   pantoprazole (PROTONIX) EC tablet 20 mg (20 mg Oral Given 7/11/22 0626)   acetaminophen (TYLENOL) tablet 650 mg (has no administration in time range)   ondansetron (ZOFRAN) injection 4 mg (has no administration in time range)   predniSONE tablet 5 mg (5 mg Oral Given 7/11/22 0829)   cholecalciferol (VITAMIN D3) tablet 1,000 Units (1,000 Units Oral Given 7/11/22 0830)   clobetasol (TEMOVATE) 0 05 % cream ( Topical Given 7/11/22 0830)   gabapentin (NEURONTIN) capsule 100 mg (100 mg Oral Given 7/11/22 0829)   gabapentin (NEURONTIN) capsule 300 mg (300 mg Oral Given 7/10/22 2359)   hydroxychloroquine (PLAQUENIL) tablet 200 mg (200 mg Oral Given 7/11/22 0830)   clotrimazole (LOTRIMIN) 1 % cream ( Topical Given 7/11/22 0830)   white petrolatum-mineral oil (EUCERIN,HYDROCERIN) cream ( Topical Given 7/11/22 0831)   cefepime (MAXIPIME) 2 g/50 mL dextrose IVPB (0 mg Intravenous Stopped 7/10/22 2209)   vancomycin (VANCOCIN) 1500 mg in sodium chloride 0 9% 250 mL IVPB (0 mg/kg × 79 6 kg (Adjusted) Intravenous Stopped 7/10/22 2330)   multi-electrolyte (ISOLYTE-S PH 7 4) bolus 1,000 mL (0 mL Intravenous Stopped 7/10/22 2209)   acetaminophen (TYLENOL) rectal suppository 325 mg (325 mg Rectal Given 7/10/22 2144)   sodium chloride 0 9 % bolus 1,000 mL (0 mL Intravenous Stopped 7/10/22 2350)   sodium chloride 0 9 % bolus 1,000 mL (0 mL Intravenous Stopped 7/11/22 0005)     Followed by   sodium chloride 0 9 % bolus 1,000 mL (0 mL Intravenous Stopped 7/11/22 0019)   methylPREDNISolone sodium succinate (Solu-MEDROL) injection 60 mg (60 mg Intravenous Given 7/10/22 2359)   acetaminophen (TYLENOL) tablet 650 mg (650 mg Oral Given 7/10/22 2340)     VAS lower limb venous duplex study, complete bilateral   Final Result      CT head without contrast   Final Result      No acute intracranial abnormality  I agree with preliminary report provided by [employer] [facility]  Workstation performed: [id number]         CT chest abdomen pelvis wo contrast   Final Result      No acute abdominopelvic pathology  Nodular opacity in the right lower lobe without a correlate on the prior study may represent a vessel or nodule  Follow up chest CT is advised in 6-12 months              Workstation performed: [id number]           Orders Placed This Encounter   Procedures    Blood culture #1    Blood culture #2    COVID/FLU/RSV    CT head without contrast    CT chest abdomen pelvis wo contrast CBC and differential    Comprehensive metabolic panel    Lactic acid    Procalcitonin    Protime-INR    APTT    UA w Reflex to Microscopic w Reflex to Culture    HS Troponin 0hr (reflex protocol)    TSH, 3rd generation with Free T4 reflex    CK (with reflex to MB)    Lipase    Urine Microscopic    CKMB    HS Troponin I 2hr    HS Troponin I 4hr    T4, free    Lactic acid 2 Hours    Procalcitonin, Next Day AM Collection    Comprehensive metabolic panel    Magnesium    Phosphorus    CBC and differential    Lactic acid, plasma    Lactic acid 2 Hours    CBC and differential    Basic metabolic panel    Sedimentation rate, automated    C-reactive protein    Diet Cardiovascular;  Sodium 2 GM    Vital Signs per unit routine    Daily weights    I/O    Apply SCD only    Vital signs every 15 minutes x 4 after fluid bolus    Discontinue urinary catheter    Urinary retention protocol    Skin care    Elevate heels off of bed    Skin care    Turn patient    Skin care    Wound care    Level 1-Full Code: all life saving measures are indicated    Pharmacy general consult    Inpatient consult to Infectious Diseases    Inpatient consult to Case Management    Inpatient consult to Dermatology    Inpatient consult to Rheumatology    OT eval and treat    PT eval and treat    ECG 12 lead    ECG 12 lead    Inpatient Consult to Wound Care Nurse    Inpatient Admission    Update level of care     Labs Reviewed   CBC AND DIFFERENTIAL - Abnormal       Result Value Ref Range Status    WBC 12 01 (*) 4 31 - 10 16 Thousand/uL Final    RBC 5 06  3 81 - 5 12 Million/uL Final    Hemoglobin 14 4  11 5 - 15 4 g/dL Final    Hematocrit 45 7  34 8 - 46 1 % Final    MCV 90  82 - 98 fL Final    MCH 28 5  26 8 - 34 3 pg Final    MCHC 31 5  31 4 - 37 4 g/dL Final    RDW 14 5  11 6 - 15 1 % Final    MPV 12 0  8 9 - 12 7 fL Final    Platelets 644  173 - 390 Thousands/uL Final    nRBC 0  /100 WBCs Final    Neutrophils Relative 73  43 - 75 % Final    Immat GRANS % 1 0 - 2 % Final    Lymphocytes Relative 16  14 - 44 % Final    Monocytes Relative 9  4 - 12 % Final    Eosinophils Relative 1  0 - 6 % Final    Basophils Relative 0  0 - 1 % Final    Neutrophils Absolute 8 79 (*) 1 85 - 7 62 Thousands/µL Final    Immature Grans Absolute 0 06  0 00 - 0 20 Thousand/uL Final    Lymphocytes Absolute 1 93  0 60 - 4 47 Thousands/µL Final    Monocytes Absolute 1 04  0 17 - 1 22 Thousand/µL Final    Eosinophils Absolute 0 17  0 00 - 0 61 Thousand/µL Final    Basophils Absolute 0 02  0 00 - 0 10 Thousands/µL Final   COMPREHENSIVE METABOLIC PANEL - Abnormal    Sodium 139  136 - 145 mmol/L Final    Potassium 5 5 (*) 3 5 - 5 3 mmol/L Final    Comment: Slightly Hemolyzed; Results May be Affected    Chloride 108  100 - 108 mmol/L Final    CO2 22  21 - 32 mmol/L Final    ANION GAP 9  4 - 13 mmol/L Final    BUN 20  5 - 25 mg/dL Final    Creatinine 1 44 (*) 0 60 - 1 30 mg/dL Final    Comment: Standardized to IDMS reference method    Glucose 80  65 - 140 mg/dL Final    Comment: If the patient is fasting, the ADA then defines impaired fasting glucose as > 100 mg/dL and diabetes as > or equal to 123 mg/dL  Specimen collection should occur prior to Sulfasalazine administration due to the potential for falsely depressed results  Specimen collection should occur prior to Sulfapyridine administration due to the potential for falsely elevated results  Calcium 9 4  8 3 - 10 1 mg/dL Final    Corrected Calcium 10 4 (*) 8 3 - 10 1 mg/dL Final    AST 40  5 - 45 U/L Final    Comment: Slightly Hemolyzed; Results May be Affected  Specimen collection should occur prior to Sulfasalazine administration due to the potential for falsely depressed results  ALT 29  12 - 78 U/L Final    Comment: Specimen collection should occur prior to Sulfasalazine and/or Sulfapyridine administration due to the potential for falsely depressed results       Alkaline Phosphatase 60  46 - 116 U/L Final    Total Protein 7 7  6 4 - 8 2 g/dL Final    Albumin 2 8 (*) 3 5 - 5 0 g/dL Final    Total Bilirubin 0 95  0 20 - 1 00 mg/dL Final    Comment: Use of this assay is not recommended for patients undergoing treatment with eltrombopag due to the potential for falsely elevated results  eGFR 36  ml/min/1 73sq m Final    Narrative:     [place] guidelines for Chronic Kidney Disease (CKD):     Stage 1 with normal or high GFR (GFR > 90 mL/min/1 73 square meters)    Stage 2 Mild CKD (GFR = 60-89 mL/min/1 73 square meters)    Stage 3A Moderate CKD (GFR = 45-59 mL/min/1 73 square meters)    Stage 3B Moderate CKD (GFR = 30-44 mL/min/1 73 square meters)    Stage 4 Severe CKD (GFR = 15-29 mL/min/1 73 square meters)    Stage 5 End Stage CKD (GFR <15 mL/min/1 73 square meters)  Note: GFR calculation is accurate only with a steady state creatinine   LACTIC ACID, PLASMA - Abnormal    LACTIC ACID 3 9 (*) 0 5 - 2 0 mmol/L Final    Narrative:     Result may be elevated if tourniquet was used during collection     PROTIME-INR - Abnormal    Protime 15 3 (*) 11 6 - 14 5 seconds Final    INR 1 26 (*) 0 84 - 1 19 Final   UA W REFLEX TO MICROSCOPIC WITH REFLEX TO CULTURE - Abnormal    Color, UA Yellow   Final    Clarity, UA Clear   Final    Specific [place], UA 1 016  1 003 - 1 030 Final    pH, UA 5 0  4 5, 5 0, 5 5, 6 0, 6 5, 7 0, 7 5, 8 0 Final    Leukocytes, UA Negative  Negative Final    Nitrite, UA Negative  Negative Final    Protein, UA Trace (*) Negative mg/dl Final    Glucose, UA Negative  Negative mg/dl Final    Ketones, UA 20 (1+) (*) Negative mg/dl Final    Urobilinogen, UA <2 0  <2 0 mg/dl mg/dl Final    Bilirubin, UA Negative  Negative Final    Occult Blood, UA Negative  Negative Final   HS TROPONIN I 0HR - Abnormal    hs TnI 0hr 100 (*) "Refer to ACS Flowchart"- see link ng/L Final    Comment:                                              Initial (time 0) result  If >=50 ng/L, Myocardial injury suggested ;  Type of myocardial injury and treatment strategy  to be determined  If 5-49 ng/L, a delta result at 2 hours and or 4 hours will be needed to further evaluate  If <4 ng/L, and chest pain has been >3 hours since onset, patient may qualify for discharge based on the HEART score in the ED  If <5 ng/L and <3hours since onset of chest pain, a delta result at 2 hours will be needed to further evaluate  HS Troponin 99th Percentile URL of a Health Population=12 ng/L with a 95% Confidence Interval of 8-18 ng/L  Second Troponin (time 2 hours)  If calculated delta >= 20 ng/L,  Myocardial injury suggested ; Type of myocardial injury and treatment strategy to be determined  If 5-49 ng/L and the calculated delta is 5-19 ng/L, consult medical service for evaluation  Continue evaluation for ischemia on ecg and other possible etiology and repeat hs troponin at 4 hours  If delta is <5 ng/L at 2 hours, consider discharge based on risk stratification via the HEART score (if in ED), or JAMEL risk score in IP/Observation  HS Troponin 99th Percentile URL of a Health Population=12 ng/L with a 95% Confidence Interval of 8-18 ng/L    TSH, 3RD GENERATION WITH FREE T4 REFLEX - Abnormal    TSH 3RD [facility] 4 970 (*) 0 450 - 4 500 uIU/mL Final    Comment: The recommended reference ranges for TSH during pregnancy are as follows:   First trimester 0 1 to 2 5 uIU/mL   Second trimester  0 2 to 3 0 uIU/mL   Third trimester 0 3 to 3 0 uIU/m    Note: Normal ranges may not apply to patients who are transgender, non-binary, or whose legal sex, sex at birth, and gender identity differ  Adult TSH (3rd generation) reference range follows the recommended guidelines of the American Thy[employer] Association, January, 2020  Narrative:     Patients undergoing fluorescein dye angiography may retain small amounts of fluorescein in the body for 48-72 hours post procedure  Samples containing fluorescein can produce falsely depressed TSH values   If the patient had this procedure,a specimen should be resubmitted post fluorescein clearance  CK - Abnormal    Total  (*) 26 - 192 U/L Final    Comment: Slightly Hemolyzed; Results May be Affected   LIPASE - Abnormal    Lipase 47 (*) 73 - 393 u/L Final   URINE MICROSCOPIC - Abnormal    RBC, UA 1-2  None Seen, 1-2 /hpf Final    WBC, UA 1-2  None Seen, 1-2 /hpf Final    Epithelial Cells Occasional  None Seen, Occasional /hpf Final    Bacteria, UA None Seen  None Seen, Occasional /hpf Final    Hyaline Casts, UA 0-3 (*) None Seen /lpf Final   HS TROPONIN I 2HR - Abnormal    hs TnI 2hr 104 (*) "Refer to ACS Flowchart"- see link ng/L Final    Comment:                                              Initial (time 0) result  If >=50 ng/L, Myocardial injury suggested ;  Type of myocardial injury and treatment strategy  to be determined  If 5-49 ng/L, a delta result at 2 hours and or 4 hours will be needed to further evaluate  If <4 ng/L, and chest pain has been >3 hours since onset, patient may qualify for discharge based on the HEART score in the ED  If <5 ng/L and <3hours since onset of chest pain, a delta result at 2 hours will be needed to further evaluate  HS Troponin 99th Percentile URL of a Health Population=12 ng/L with a 95% Confidence Interval of 8-18 ng/L  Second Troponin (time 2 hours)  If calculated delta >= 20 ng/L,  Myocardial injury suggested ; Type of myocardial injury and treatment strategy to be determined  If 5-49 ng/L and the calculated delta is 5-19 ng/L, consult medical service for evaluation  Continue evaluation for ischemia on ecg and other possible etiology and repeat hs troponin at 4 hours  If delta is <5 ng/L at 2 hours, consider discharge based on risk stratification via the HEART score (if in ED), or JAMEL risk score in IP/Observation  HS Troponin 99th Percentile URL of a Health Population=12 ng/L with a 95% Confidence Interval of 8-18 ng/L      Delta 2hr hsTnI 4  <20 ng/L Final   LACTIC ACID 2 HOUR - Abnormal    LACTIC ACID 2 7 (*) 0 5 - 2 0 mmol/L Final    Narrative:     Result may be elevated if tourniquet was used during collection  HS TROPONIN I 4HR - Abnormal    hs TnI 4hr 133 (*) "Refer to ACS Flowchart"- see link ng/L Final    Comment:                                              Initial (time 0) result  If >=50 ng/L, Myocardial injury suggested ;  Type of myocardial injury and treatment strategy  to be determined  If 5-49 ng/L, a delta result at 2 hours and or 4 hours will be needed to further evaluate  If <4 ng/L, and chest pain has been >3 hours since onset, patient may qualify for discharge based on the HEART score in the ED  If <5 ng/L and <3hours since onset of chest pain, a delta result at 2 hours will be needed to further evaluate  HS Troponin 99th Percentile URL of a Health Population=12 ng/L with a 95% Confidence Interval of 8-18 ng/L  Second Troponin (time 2 hours)  If calculated delta >= 20 ng/L,  Myocardial injury suggested ; Type of myocardial injury and treatment strategy to be determined  If 5-49 ng/L and the calculated delta is 5-19 ng/L, consult medical service for evaluation  Continue evaluation for ischemia on ecg and other possible etiology and repeat hs troponin at 4 hours  If delta is <5 ng/L at 2 hours, consider discharge based on risk stratification via the HEART score (if in ED), or JAMEL risk score in IP/Observation  HS Troponin 99th Percentile URL of a Health Population=12 ng/L with a 95% Confidence Interval of 8-18 ng/L  Delta 4hr hsTnI 33 (*) <20 ng/L Final   LACTIC ACID, PLASMA - Abnormal    LACTIC ACID 2 1 (*) 0 5 - 2 0 mmol/L Final    Narrative:     Result may be elevated if tourniquet was used during collection     COVID19, INFLUENZA A/B, RSV PCR, [facility] - Normal    SARS-CoV-2 Negative  Negative Final    Comment:      INFLUENZA A PCR Negative  Negative Final    Comment:      INFLUENZA B PCR Negative  Negative Final    Comment:      RSV PCR Negative Negative Final    Comment:      Narrative:     FOR PEDIATRIC PATIENTS - copy/paste COVID Guidelines URL to browser: [email or web address]    SARS-CoV-2 assay is a Nucleic Acid Amplification assay intended for the  qualitative detection of nucleic acid from SARS-CoV-2 in nasopharyngeal  swabs  Results are for the presumptive identification of SARS-CoV-2 RNA  Positive results are indicative of infection with SARS-CoV-2, the virus  causing COVID-19, but do not rule out bacterial infection or co-infection  with other viruses  Laboratories within the [place] and its  territories are required to report all positive results to the appropriate  public health authorities  Negative results do not preclude SARS-CoV-2  infection and should not be used as the sole basis for treatment or other  patient management decisions  Negative results must be combined with  clinical observations, patient history, and epidemiological information  This test has not been FDA cleared or approved  This test has been authorized by FDA under an Emergency Use Authorization  (EUA)  This test is only authorized for the duration of time the  declaration that circumstances exist justifying the authorization of the  emergency use of an in vitro diagnostic tests for detection of SARS-CoV-2  virus and/or diagnosis of COVID-19 infection under section 564(b)(1) of  the Act, 21 U  S C  408FEO-5(R)(7), unless the authorization is terminated  or revoked sooner  The test has been validated but independent review by FDA  and CLIA is pending  Test performed using [employer] GeneXpert: This RT-PCR assay targets N2,  a region unique to SARS-CoV-2  A conserved region in the E-gene was chosen  for pan-Sarbecovirus detection which includes SARS-CoV-2     PROCALCITONIN TEST - Normal    Procalcitonin 0 15  <=0 25 ng/ml Final    Comment: Suspected Lower Respiratory Tract Infection (LRTI):  - LESS than or EQUAL to 0 25 ng/mL:   low likelihood for bacterial LRTI; antibiotics DISCOURAGED   - GREATER than 0 25 ng/mL:   increased likelihood for bacterial LRTI; antibiotics ENCOURAGED  Suspected Sepsis:  - Strongly consider initiating antibiotics in ALL UNSTABLE patients  - LESS than or EQUAL to 0 5 ng/mL:   low likelihood for bacterial sepsis; antibiotics DISCOURAGED   - GREATER than 0 5 ng/mL:   increased likelihood for bacterial sepsis; antibiotics ENCOURAGED   - GREATER than 2 ng/mL:   high risk for severe sepsis / septic shock; antibiotics strongly ENCOURAGED  Decisions on antibiotic use should not be based solely on Procalcitonin (PCT) levels  If PCT is low but uncertainty exists with stopping antibiotics, repeat PCT in 6-24 hours to confirm the low level  If antibiotics are administered (regardless if initial PCT was high or low), repeat PCT every 1-2 days to consider early antibiotic cessation (when GREATER than 80% decrease from the peak OR when PCT drops below designated cutoffs, whichever comes first), so long as the infection is NOT one that typically requires prolonged treatment durations (e g , bone/joint infections, endocarditis, Staph  aureus bacteremia)      Situations of FALSE-POSITIVE Procalcitonin values:  1) Newborns < 67 hours old  2) Massive stress from severe trauma / burns, major surgery, acute pancreatitis, cardiogenic / hemorrhagic shock, sickle cell crisis, or other organ perfusion abnormalities  3) Malaria and some Candidal infections  4) Treatment with agents that stimulate cytokines (e g , OKT3, anti-lymphocyte globulins, alemtuzumab, IL-2, granulocyte transfusion [NOT GCSFs])  5) Chronic renal disease causes elevated baseline levels (consider GREATER than 0 75 ng/mL as an abnormal cut-off); initiating HD/CRRT may cause transient decreases  6) Paraneoplastic syndromes from medullary thyroid or SCLC, some forms of vasculitis, and acute [id number] disease    Situations of FALSE-NEGATIVE Procalcitonin values:  1) Too early in clinical course for PCT to have reached its peak (may repeat in 6-24 hours to confirm low level)  2) Localized infection WITHOUT systemic (SIRS / sepsis) response (e g , an abscess, osteomyelitis, cystitis)  3) Mycobacteria (e g , Tuberculosis, MAC)  4) Cystic fibrosis exacerbations     APTT - Normal    PTT 33  23 - 37 seconds Final    Comment: Therapeutic Heparin Range =  60-90 seconds   CKMB - Normal    CK-MB Index 1 1  0 0 - 2 5 % Final    CK-MB 3 8  0 0 - 5 0 ng/mL Final   T4, FREE - Normal    Free T4 1 37  0 76 - 1 46 ng/dL Final    Comment: Specimen collection should occur prior to Sulfasalazine administration due to the potential for falsely elevated results  BLOOD CULTURE    Blood Culture Received in Microbiology Lab  Culture in Progress  Preliminary   BLOOD CULTURE    Blood Culture Received in Microbiology Lab  Culture in Progress     Preliminary     Time reflects when diagnosis was documented in both MDM as applicable and the Disposition within this note       Time User Action Codes Description Comment    7/10/2022  9:13 PM Lane Mercy A Add [G93 40] Acute encephalopathy     7/10/2022  9:13 PM Lane Mercy A Add [H57 02] Anisocoria     7/10/2022  9:14 PM Lane Mercy A Add [R10 9] Acute abdominal pain     7/10/2022  9:14 PM Lane Mercy A Add [E86 0] Dehydration     7/10/2022  9:14 PM Lane Mercy A Add [M79 605] Acute leg pain, left     7/10/2022  9:14 PM Lane Mercy A Add [R50 9] Fever     7/10/2022  9:14 PM Lane Mercy A Add [R21] Rash     7/10/2022  9:41 PM Lane Mercy A Add [E87 5] Hyperkalemia     7/10/2022  9:41 PM LegareAdam A Add [N17 9] AMANDA (acute kidney injury) ([facility] [address] )     7/10/2022 10:14 PM Lane Mercy Add [A41 9,  R65 20] Severe sepsis ([facility] [address] )     7/10/2022 10:14 PM Lane Mercy Modify [G93 40] Acute encephalopathy     7/10/2022 10:14 PM Legare, Freddie READ Modify [A41 9,  R65 20] Severe sepsis ([facility] [address] )     7/10/2022 10:28 PM Claudene Keener 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           ED Disposition       ED Disposition   Admit    Condition   Stable    Date/Time   Sun Jul 10, 2022 10:23 PM    Comment   Case was discussed with XENIA and the patient's admission status was agreed to be Admission Status: inpatient status to the service of Dr Jayden Domínguez   Follow-up Information    None       Current Discharge Medication List        CONTINUE these medications which have CHANGED    Details   apixaban (ELIQUIS) 5 mg Take 1 tablet (5 mg total) by mouth 2 (two) times a day  Qty: 60 tablet, Refills: 0    Comments: Pt to schedule follow up visit  Associated Diagnoses: [facility]           CONTINUE these medications which have NOT CHANGED    Details   acetaminophen (TYLENOL) 325 mg tablet Take 2 tablets (650 mg total) by mouth every 4 (four) hours as needed for mild pain, headaches or fever    Qty: 30 tablet, Refills: 0      acitretin (SORIATANE) 10 MG capsule Take 1 capsule (10 mg total) by mouth daily for 14 days  Qty: 14 capsule, Refills: 0    Associated Diagnoses: Psoriasis      Adalimumab 40 MG/0 8ML PNKT Inject 40 mg under the skin every 14 (fourteen) days        alendronate (FOSAMAX) 70 mg tablet Take by mouth every 7 days       cholecalciferol (VITAMIN D3) 1,000 units tablet Take 1,000 Units by mouth daily      Clobetasol Propionate E 0 05 % emollient cream APPLY TWICE A DAY FOR PALM SKIN DERMITITS      gabapentin (Neurontin) 100 mg capsule Take 1 capsule in AM and 3 capsules in PM  Qty: 120 capsule, Refills: 1    Comments: New dose as of 4/21/22  Associated Diagnoses: Lumbar radiculopathy      guaiFENesin (MUCINEX) 600 mg 12 hr tablet Take 1 tablet (600 mg total) by mouth every 12 (twelve) hours as needed for cough or congestion  Refills: 0    Associated Diagnoses: COVID-19      hydrochlorothiazide (HYDRODIURIL) 12 5 mg tablet Take 1 tablet (12 5 mg total) by mouth daily  Qty: 90 tablet, Refills: 0    Associated Diagnoses: Benign essential hypertension      hydroxychloroquine (PLAQUENIL) 200 mg tablet Take 200 mg by mouth 2 (two) times a day       ketoconazole (NIZORAL) 2 % cream Apply topically 2 (two) times a day  Qty: 60 g, Refills: 0    Associated Diagnoses: Rash      lidocaine (LIDODERM) 5 % Apply 1 patch topically daily Remove & Discard patch within 12 hours or as directed by MD  Refills: 0    Associated Diagnoses: Rheumatoid arthritis involving multiple sites, unspecified whether rheumatoid factor present (HCC)      metoprolol tartrate (LOPRESSOR) 25 mg tablet Take 1 tablet (25 mg total) by mouth every 12 (twelve) hours  Qty: 180 tablet, Refills: 0    Associated Diagnoses: Paroxysmal atrial fibrillation (HCC)      Miconazole Nitrate (Manjeet Antifungal) 2 % cream Apply topically 2 (two) times a day To sacrum  Refills: 0    Associated Diagnoses: Skin rash      omeprazole (PriLOSEC) 20 mg delayed release capsule TAKE 1 CAPSULE DAILY  Qty: 90 capsule, Refills: 3    Associated Diagnoses: Gastroesophageal reflux disease without esophagitis      potassium chloride (K-DUR,KLOR-CON) 10 mEq tablet Take 1 tablet (10 mEq total) by mouth daily  Qty: 90 tablet, Refills: 0    Comments: Pt to schedule follow up visit  Associated Diagnoses: Atrial fibrillation, new onset (HCC)      predniSONE 5 mg tablet Take 5 mg by mouth daily 1 mg prednisone 2 x/day      triamcinolone (KENALOG) 0 1 % cream Apply 1 application topically 2 (two) times a day To affected area  Refills: 0      white petrolatum-mineral oil (EUCERIN,HYDROCERIN) cream Apply topically 3 (three) times a day  Qty: 454 g, Refills: 0    Associated Diagnoses: Psoriasis           No discharge procedures on file  Prior to Admission Medications   Prescriptions Last Dose Informant Patient Reported? Taking?    Adalimumab 40 MG/0 8ML PNKT Not Taking at Unknown time  Yes No   Sig: Inject 40 mg under the skin every 14 (fourteen) days     Patient not taking: No sig reported   Clobetasol Propionate E 0 05 % emollient cream Unknown at Unknown time  Yes No   Sig: APPLY TWICE A DAY FOR PALM SKIN DERMITITS   Miconazole Nitrate (Manjeet Antifungal) 2 % cream Not Taking at Unknown time  No No   Sig: Apply topically 2 (two) times a day To sacrum   Patient not taking: No sig reported   acetaminophen (TYLENOL) 325 mg tablet Unknown at Unknown time  No No   Sig: Take 2 tablets (650 mg total) by mouth every 4 (four) hours as needed for mild pain, headaches or fever     acitretin (SORIATANE) 10 MG capsule   No No   Sig: Take 1 capsule (10 mg total) by mouth daily for 14 days   alendronate (FOSAMAX) 70 mg tablet   Yes No   Sig: Take by mouth every 7 days    apixaban (ELIQUIS) 5 mg   No No   Sig: Take 1 tablet (5 mg total) by mouth 2 (two) times a day   cholecalciferol (VITAMIN D3) 1,000 units tablet Unknown at Unknown time Self Yes No   Sig: Take 1,000 Units by mouth daily   gabapentin (Neurontin) 100 mg capsule 7/8/2022 at Unknown time  No No   Sig: Take 1 capsule in AM and 3 capsules in PM   guaiFENesin (MUCINEX) 600 mg 12 hr tablet Not Taking at Unknown time  No No   Sig: Take 1 tablet (600 mg total) by mouth every 12 (twelve) hours as needed for cough or congestion   Patient not taking: Reported on 7/11/2022   hydrochlorothiazide (HYDRODIURIL) 12 5 mg tablet 7/8/2022  No No   Sig: Take 1 tablet (12 5 mg total) by mouth daily   hydroxychloroquine (PLAQUENIL) 200 mg tablet 7/8/2022  Yes No   Sig: Take 200 mg by mouth 2 (two) times a day    ketoconazole (NIZORAL) 2 % cream Unknown at Unknown time  No No   Sig: Apply topically 2 (two) times a day   lidocaine (LIDODERM) 5 % Not Taking at Unknown time  No No   Sig: Apply 1 patch topically daily Remove & Discard patch within 12 hours or as directed by MD   Patient not taking: No sig reported   metoprolol tartrate (LOPRESSOR) 25 mg tablet 7/8/2022  No No   Sig: Take 1 tablet (25 mg total) by mouth every 12 (twelve) hours   omeprazole (PriLOSEC) 20 mg delayed release capsule Unknown at Unknown time  No No   Sig: TAKE 1 CAPSULE DAILY   potassium chloride (K-DUR,KLOR-CON) 10 mEq tablet Unknown at Unknown time  No No   Sig: Take 1 tablet (10 mEq total) by mouth daily   predniSONE 5 mg tablet 7/8/2022  Yes No   Sig: Take 5 mg by mouth daily 1 mg prednisone 2 x/day   triamcinolone (KENALOG) 0 1 % cream Unknown at Unknown time  Yes No   Sig: Apply 1 application topically 2 (two) times a day To affected area   white petrolatum-mineral oil (EUCERIN,HYDROCERIN) cream Unknown at Unknown time  No No   Sig: Apply topically 3 (three) times a day      Facility-Administered Medications: None       Portions of the record may have been created with voice recognition software  Occasional wrong word or "sound a like" substitutions may have occurred due to the inherent limitations of voice recognition software  Read the chart carefully and recognize, using context, where substitutions have occurred      Electronically signed by:  Rayne Matthews

## 2022-07-11 NOTE — ASSESSMENT & PLAN NOTE
Patient presented with fevers, fatigue  One episode of loose stools prior to presentation  SIRS versus sepsis POA as evidenced by fevers, tachycardia, tachypnea with leukocytosis  Infection versus inflammatory response  · CT abdomen pelvis negative for acute pathology  · Urinalysis negative  · Blood cultures pending  · Of note, ran out of prednisone approximately 1 week ago, now with pustular psoriasis flare  Has history of sepsis secondary to dermatitis    · Currently on intravenous cefepime/vanco, Infectious Disease consult pending  · Additionally to be seen by Rheumatology and Dermatology  · Trend temp/white blood cell count

## 2022-07-11 NOTE — CONSULTS
Consultation - Infectious Disease   Corby Diaz 66 y o  female MRN: [id number]  Unit/Bed#: [facility] 201-01 Encounter: [id number]      IMPRESSION & RECOMMENDATIONS:     SIRS, POA   Present on admission, evidenced by fevers, tachycardia, tachypnea, hypotension, and leukocytosis  Symptoms likely secondary to glucocorticoid deficiency after stopping prednisone 1 week ago  Unlikely infectious as workup has been negative and no evident source of infection has been identified  Patient hemodynamically stable and clinically improved after prednisone was restarted  Scaly skin rash with no signs of skin infection, likely pustular psoriasis flare after discontinuation of prednisone  Discontinued antibiotics as source is unlikely infectious  Monitor fevers and WBC count  Monitor hemodynamics  Follow up blood cultures    AMANDA  Present on admission with elevated creatinine 1 44, baseline 0 9  Improved after IV fluids  Continue trending creatinine  Management per primary team     Rheumatoid Arthritis  Chronically on hydroxychloroquine 200 mg BID and prednisone 5mg BID  Follows up with Dr Kira Gaston  Patient off prednisone for 1 week presenting with weakness, fatigue, generalized pain and flare of psoriasis  Patient clinically improved after medications were restarted on admission  Pending rheumatology consult  Continue outpatient follow up with rheumatology  Pustular psoriasis  Follows up with dermatology outpatient  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  Patient was previously on Humira, however, was discontinued after she developed rash which though it was secondary to the medication  Pending dermatology consult  Continue outpatient follow up with dermatology    Paroxysmal Afib  Controlled on metoprolol  Anticogulated with Eliquis    Management by primary team     Hypertension  Management by primary team       HISTORY OF PRESENT ILLNESS:  Reason for Consult: SIRS    HPI: Corby Diaz is a 66y o  year old female with history of morbid obesity, hypertension, AFib, GERD, rheumatoid arthritis, and pustular psoriasis who presented with altered mental status and feeling unwell for the past 2 days  Patient reports that ran out of her prednisone 1 week ago and started feeling unwell and Saturday morning  Reports generalized pain and subjective fevers, chills, and 1 episode of loose stools  Per daughter, patient has not being eating well since Saturday morning  Per EMS, patient was found covered in urine and stool  On presentation, patient was febrile 101 8, and tachypneic  Labs were significant for WBC 12 01, lactic acid 3 9, and elevated creatinine 1 44  Urinalysis was positive for protein and ketones  COVID/flu/RSV was negative  CT chest/abdomen/pelvis was negative for signs of infection  CT head was negative  BL LE duplex negative for DVT or thrombophlebitis  Patient was meeting SIRS criteria for which was started on IV cefepime and vancomycin and admitted for further evaluation  Prednisone was restarted on admission  Patient is currently hemodynamically stable and clinically improved  Subjectively feeling better  REVIEW OF SYSTEMS:  A complete 12 point system-based review of systems is negative other than that noted in the HPI      PAST MEDICAL HISTORY:  Past Medical History:   Diagnosis Date    Abnormal thyroid function test     last assessed: 2015     Arthritis     Caries     last assessed: 2016     Continuous opioid dependence [facility]) 2021    Edema of right lower extremity     last assessed: 2015     GERD (gastroesophageal reflux disease)     Hypertension     Medicare annual wellness visit, subsequent 2021    Positive blood culture 3/11/2022    Sarcoid      Past Surgical History:   Procedure Laterality Date     SECTION      MULTIPLE TOOTH EXTRACTIONS N/A 2016    Procedure: Surgical extraction of teeth 2, 18, 19, 30, 31; incision and drainage of left subperiosteal abscess ;  Surgeon: Christine Shearer DMD;  Location: [facility];  Service:        FAMILY HISTORY:  Non-contributory    SOCIAL HISTORY:  Social History   Social History     Substance and Sexual Activity   Alcohol Use No     Social History     Substance and Sexual Activity   Drug Use No     Social History     Tobacco Use   Smoking Status Never Smoker   Smokeless Tobacco Never Used       ALLERGIES:  Allergies   Allergen Reactions    Methotrexate Derivatives     Shellfish-Derived Products - Food Allergy Itching    Amoxicillin Rash    Ampicillin-Sulbactam Sodium Rash       MEDICATIONS:  All current active medications have been reviewed  PHYSICAL EXAM:  Temp:  [97 5 °F (36 4 °C)-102 1 °F (38 9 °C)] 97 5 °F (36 4 °C)  HR:  [] 82  Resp:  [13-30] 13  BP: ()/(43-91) 136/59  SpO2:  [93 %-100 %] 100 %  Temp (24hrs), Av 1 °F (37 8 °C), Min:97 5 °F (36 4 °C), Max:102 1 °F (38 9 °C)  Current: Temperature: 97 5 °F (36 4 °C)    Intake/Output Summary (Last 24 hours) at 2022 1054  Last data filed at 2022 0019  Gross per 24 hour   Intake 4300 ml   Output --   Net 4300 ml       General Appearance:  Appearing well, nontoxic, and in no distress   Head:  Normocephalic, without obvious abnormality, atraumatic   Eyes:  Conjunctiva pink and sclera anicteric, both eyes   Nose: Nares normal, mucosa normal, no drainage   Throat: Oropharynx moist without lesions   Neck: Supple, symmetrical, no adenopathy, no tenderness/mass/nodules   Back:   Symmetric, no curvature, ROM normal, no CVA tenderness   Lungs:   Clear to auscultation bilaterally, respirations unlabored   Chest Wall:  No tenderness or deformity   Heart:  RRR; no murmur, rub or gallop   Abdomen:   Soft, non-tender, non-distended, positive bowel sounds    Extremities: No cyanosis, clubbing or edema   Skin: Psoriatic rash noted in face, chest, abdomen, arms and mid thighs  No draining wounds noted     Lymph nodes: Cervical, supraclavicular nodes normal   Neurologic: Alert and oriented times 3, extremity strength 5/5 and symmetric       LABS, IMAGING, & OTHER STUDIES:  Lab Results:  I have personally reviewed pertinent labs  Results from last 7 days   Lab Units 07/11/22  0513 07/10/22  2059   WBC Thousand/uL 9 54 12 01*   HEMOGLOBIN g/dL 13 5 14 4   PLATELETS Thousands/uL 153 203     Results from last 7 days   Lab Units 07/11/22  0625 07/10/22  2059   POTASSIUM mmol/L 4 5 5 5*   CHLORIDE mmol/L 113* 108   CO2 mmol/L 17* 22   BUN mg/dL 19 20   CREATININE mg/dL 1 26 1 44*   EGFR ml/min/1 73sq m 42 36   CALCIUM mg/dL 8 5 9 4   AST U/L 34 40   ALT U/L 26 29   ALK PHOS U/L 52 60     Results from last 7 days   Lab Units 07/10/22  2136 07/10/22  2059   BLOOD CULTURE  Received in Microbiology Lab  Culture in Progress  Received in Microbiology Lab  Culture in Progress  Imaging Studies:   I have personally reviewed pertinent imaging study reports and images in PACS  Other Studies:   I have personally reviewed pertinent reports

## 2022-07-11 NOTE — ASSESSMENT & PLAN NOTE
Would get the opinion of dermatology and rheumatology  Continue prednisone 5 mg twice daily  Continue emollient creams and moisturizers    Continue steroid cream
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
## 2022-07-11 NOTE — PLAN OF CARE
Problem: Prexisting or High Potential for Compromised Skin Integrity  Goal: Skin integrity is maintained or improved  Description: INTERVENTIONS:  - Identify patients at risk for skin breakdown  - Assess and monitor skin integrity  - Assess and monitor nutrition and hydration status  - Monitor labs   - Assess for incontinence   - Turn and reposition patient  - Assist with mobility/ambulation  - Relieve pressure over bony prominences  - Avoid friction and shearing  - Provide appropriate hygiene as needed including keeping skin clean and dry  - Evaluate need for skin moisturizer/barrier cream  - Collaborate with interdisciplinary team   - Patient/family teaching  - Consider wound care consult   7/11/2022 1700 by Vonnie Babinski, RN  Outcome: Progressing  7/11/2022 1659 by Vonnie Babinski, RN  Outcome: Progressing     Problem: MOBILITY - ADULT  Goal: Maintain or return to baseline ADL function  Description: INTERVENTIONS:  -  Assess patient's ability to carry out ADLs; assess patient's baseline for ADL function and identify physical deficits which impact ability to perform ADLs (bathing, care of mouth/teeth, toileting, grooming, dressing, etc )  - Assess/evaluate cause of self-care deficits   - Assess range of motion  - Assess patient's mobility; develop plan if impaired  - Assess patient's need for assistive devices and provide as appropriate  - Encourage maximum independence but intervene and supervise when necessary  - Involve family in performance of ADLs  - Assess for home care needs following discharge   - Consider OT consult to assist with ADL evaluation and planning for discharge  - Provide patient education as appropriate  7/11/2022 1700 by Vonnie Babinski, RN  Outcome: Progressing  7/11/2022 1659 by Vonnie Babinski, RN  Outcome: Progressing  Goal: Maintains/Returns to pre admission functional level  Description: INTERVENTIONS:  - Perform BMAT or MOVE assessment daily    - Set and communicate daily mobility goal to care team and patient/family/caregiver  - Collaborate with rehabilitation services on mobility goals if consulted  - Perform Range of Motion  times a day  - Reposition patient every  hours    - Dangle patient  times a day  - Stand patient  times a day  - Ambulate patient  times a day  - Out of bed to chair  times a day   - Out of bed for meals  times a day  - Out of bed for toileting  - Record patient progress and toleration of activity level   7/11/2022 1700 by Bernadette Baires RN  Outcome: Progressing  7/11/2022 1659 by Bernadette Baires RN  Outcome: Progressing     Problem: Potential for Falls  Goal: Patient will remain free of falls  Description: INTERVENTIONS:  - Educate patient/family on patient safety including physical limitations  - Instruct patient to call for assistance with activity   - Consult OT/PT to assist with strengthening/mobility   - Keep Call bell within reach  - Keep bed low and locked with side rails adjusted as appropriate  - Keep care items and personal belongings within reach  - Initiate and maintain comfort rounds  - Make Fall Risk Sign visible to staff  - Offer Toileting every  Hours, in advance of need  - Initiate/Maintain alarm  - Obtain necessary fall risk management equipment:   - Apply yellow socks and bracelet for high fall risk patients  - Consider moving patient to room near nurses station  7/11/2022 1700 by Bernadette Baires RN  Outcome: Progressing  7/11/2022 1659 by Bernadette Baires RN  Outcome: Progressing     Problem: PAIN - ADULT  Goal: Verbalizes/displays adequate comfort level or baseline comfort level  Description: Interventions:  - Encourage patient to monitor pain and request assistance  - Assess pain using appropriate pain scale  - Administer analgesics based on type and severity of pain and evaluate response  - Implement non-pharmacological measures as appropriate and evaluate response  - Consider cultural and social influences on pain and pain management  - Notify physician/advanced practitioner if interventions unsuccessful or patient reports new pain  Outcome: Progressing     Problem: INFECTION - ADULT  Goal: Absence or prevention of progression during hospitalization  Description: INTERVENTIONS:  - Assess and monitor for signs and symptoms of infection  - Monitor lab/diagnostic results  - Monitor all insertion sites, i e  indwelling lines, tubes, and drains  - Monitor endotracheal if appropriate and nasal secretions for changes in amount and color  - [place] appropriate cooling/warming therapies per order  - Administer medications as ordered  - Instruct and encourage patient and family to use good hand hygiene technique  - Identify and instruct in appropriate isolation precautions for identified infection/condition  Outcome: Progressing  Goal: Absence of fever/infection during neutropenic period  Description: INTERVENTIONS:  - Monitor WBC    Outcome: Progressing     Problem: SAFETY ADULT  Goal: Maintain or return to baseline ADL function  Description: INTERVENTIONS:  -  Assess patient's ability to carry out ADLs; assess patient's baseline for ADL function and identify physical deficits which impact ability to perform ADLs (bathing, care of mouth/teeth, toileting, grooming, dressing, etc )  - Assess/evaluate cause of self-care deficits   - Assess range of motion  - Assess patient's mobility; develop plan if impaired  - Assess patient's need for assistive devices and provide as appropriate  - Encourage maximum independence but intervene and supervise when necessary  - Involve family in performance of ADLs  - Assess for home care needs following discharge   - Consider OT consult to assist with ADL evaluation and planning for discharge  - Provide patient education as appropriate  7/11/2022 1700 by Se Sinha RN  Outcome: Progressing  7/11/2022 1659 by Se Sinha RN  Outcome: Progressing  Goal: Maintains/Returns to pre admission functional level  Description: INTERVENTIONS:  - Perform BMAT or MOVE assessment daily    - Set and communicate daily mobility goal to care team and patient/family/caregiver  - Collaborate with rehabilitation services on mobility goals if consulted  - Perform Range of Motion  times a day  - Reposition patient every  hours    - Dangle patient  times a day  - Stand patient  times a day  - Ambulate patient  times a day  - Out of bed to chair  times a day   - Out of bed for meals  times a day  - Out of bed for toileting  - Record patient progress and toleration of activity level   7/11/2022 1700 by Walter Deutsch RN  Outcome: Progressing  7/11/2022 1659 by Walter Deutsch RN  Outcome: Progressing  Goal: Patient will remain free of falls  Description: INTERVENTIONS:  - Educate patient/family on patient safety including physical limitations  - Instruct patient to call for assistance with activity   - Consult OT/PT to assist with strengthening/mobility   - Keep Call bell within reach  - Keep bed low and locked with side rails adjusted as appropriate  - Keep care items and personal belongings within reach  - Initiate and maintain comfort rounds  - Make Fall Risk Sign visible to staff  - Offer Toileting every  Hours, in advance of need  - Initiate/Maintain alarm  - Obtain necessary fall risk management equipment:   - Apply yellow socks and bracelet for high fall risk patients  - Consider moving patient to room near nurses station  7/11/2022 1700 by Walter Deutsch RN  Outcome: Progressing  7/11/2022 1659 by Walter Deutsch RN  Outcome: Progressing     Problem: DISCHARGE PLANNING  Goal: Discharge to home or other facility with appropriate resources  Description: INTERVENTIONS:  - Identify barriers to discharge w/patient and caregiver  - Arrange for needed discharge resources and transportation as appropriate  - Identify discharge learning needs (meds, wound care, etc )  - Arrange for interpretive services to assist at discharge as needed  - Refer to Case Management Department for coordinating discharge planning if the patient needs post-hospital services based on physician/advanced practitioner order or complex needs related to functional status, cognitive ability, or social support system  Outcome: Progressing     Problem: Knowledge Deficit  Goal: Patient/family/caregiver demonstrates understanding of disease process, treatment plan, medications, and discharge instructions  Description: Complete learning assessment and assess knowledge base    Interventions:  - Provide teaching at level of understanding  - Provide teaching via preferred learning methods  Outcome: Progressing

## 2022-07-11 NOTE — CASE MANAGEMENT
Case Management Assessment & Discharge Planning Note    Patient name Aditya Cruz  Location [facility] 201/[facility] 708-61 MRN [id number]  : 1950 Date 2022       Current Admission Date: 7/10/2022  Current Admission Diagnosis:Sepsis [facility])   Patient Active Problem List    Diagnosis Date Noted    Abnormal CT of the chest 2022    Pustular psoriasis 2022    Elevated troponin 2022    COVID-19 2022    Acute renal failure (ARF) ([facility] ) 01/10/2022    Paroxysmal atrial fibrillation ([facility] ) 01/10/2022    Sepsis ([facility] ) 01/10/2022    Hyperparathyroidism ([facility] ) 2021    Murmur, cardiac 2021    Morbid obesity with BMI of 40 0-44 9, adult ([facility] ) 2019    BPPV (benign paroxysmal positional vertigo) 2018    Seasonal allergic rhinitis due to pollen 2018    Cough 2018    Erythroderma 10/27/2017    Osteoporosis 2016    Rheumatoid arthritis ([facility] ) 2016    GERD (gastroesophageal reflux disease) 2016    Sarcoid 2016    Morbid obesity ([facility] ) 2016    Vitamin D deficiency 2015    Benign essential hypertension 2014    Lumbar radiculopathy 2014      LOS (days): 1  Geometric Mean LOS (GMLOS) (days):   Days to GMLOS:     OBJECTIVE:    Risk of Unplanned Readmission Score: 24 05         Current admission status: Inpatient       Preferred Pharmacy:   [facility] [address] [place] [address]  75 [facility] [address]  Phone: [phone] Fax: [phone], [facility]   Phone: [phone] Fax: [phone]    Primary Care Provider: Maria L Mcbride DO    Primary Insurance: Brenda Garcia [facility]  Secondary Insurance:     ASSESSMENT:  Anel 26 Proxies    There are no active Health Care Proxies on file                   Readmission Root Cause  30 Day Readmission: No    Patient Information  Admitted from[de-identified] Home  Mental Status: Alert  During Assessment patient was accompanied by: Not accompanied during assessment  Assessment information provided by[de-identified] Patient, Daughter  Primary Caregiver: Self  Support Systems: Daughter  [place] of Residence: [address] [place] [address] do you live in?: [facility] entry access options   Select all that apply : Stairs  Number of steps to enter home : 1  Type of Current Residence: 2 story home  Upon entering residence, is there a bedroom on the main floor (no further steps)?: No  A bedroom is located on the following floor levels of residence (select all that apply):: 2nd Floor  Upon entering residence, is there a bathroom on the main floor (no further steps)?: No  Indicate which floors of current residence have a bathroom (select all the apply):: 2nd Floor  Number of steps to 2nd floor from main floor: One Flight  In the last 12 months, was there a time when you were not able to pay the mortgage or rent on time?: No  In the last 12 months, was there a time when you did not have a steady place to sleep or slept in a shelter (including now)?: No  Homeless/housing insecurity resource given?: N/A  Living Arrangements: Lives w/ Daughter    Activities of Daily Living Prior to Admission  Functional Status: Assistance  Completes ADLs independently?: Yes  Ambulates independently?: Yes  Does patient use assisted devices?: Yes  Assisted Devices (DME) used: Straight Marshel Handler, Bedside Commode  Does patient currently own DME?: Yes  What DME does the patient currently own?: Bedside Commode, Straight Marshel Handler  Does patient have a history of Outpatient Therapy (PT/OT)?: No  Does the patient have a history of Short-Term Rehab?: Yes (did not remember name of facility -- does not want to go to rehab did not have a good experience)  Does patient have a history of HHC?: Yes ([address])  Does patient currently have [place] [address]?: No         Patient Information Continued  Income Source: Pension/nursing home  Does patient have prescription coverage?: Yes  Within the past 12 months, you worried that your food would run out before you got the money to buy more : Never true  Within the past 12 months, the food you bought just didn't last and you didn't have money to get more : Never true  Food insecurity resource given?: N/A  Does patient receive dialysis treatments?: No  Does patient have a history of substance abuse?: No  Does patient have a history of Mental Health Diagnosis?: No         Means of Transportation  Means of Transport to Appts[de-identified] Family transport  In the past 12 months, has lack of transportation kept you from medical appointments or from getting medications?: No  In the past 12 months, has lack of transportation kept you from meetings, work, or from getting things needed for daily living?: No  Was application for public transport provided?: N/A        DISCHARGE DETAILS:    Discharge planning discussed with[de-identified] patient at bedside, daughter via telephone  Freedom of Choice: Yes  Comments - Freedom of Choice: [facility] contacted family/caregiver?: Yes  Were Treatment Team discharge recommendations reviewed with patient/caregiver?: Yes  Did patient/caregiver verbalize understanding of patient care needs?: Yes  Were patient/caregiver advised of the risks associated with not following Treatment Team discharge recommendations?: Yes    Contacts  Patient Contacts: Elpidio Pan, daughter  Relationship to Patient[de-identified] Family  Contact Method: Phone  Phone Number: [phone]  Reason/Outcome: Continuity of Care, Emergency Contact, Discharge Planning    Requested [address]         Is the patient interested in [address] at discharge?: Yes  [address] requested[de-identified] Nursing, Occupational Therapy, Physical [address] Name[de-identified] Other  [address] Provider[de-identified] PCP  Home Health Services Needed[de-identified] Strengthening/Theraputic Exercises to Improve Function, Heart Failure Management, Evaluate Functional Status and Safety  Homebound Criteria Met[de-identified] Requires the Assistance of Another Person for Safe Ambulation or to Leave the Home, Uses an Assist Device (i e  cane, walker, etc)  Supporting Clincal Findings[de-identified] Fatigues Easliy in [employer], Limited Endurance    DME Referral Provided  Referral made for DME?: No    Other Referral/Resources/Interventions Provided:  Interventions: [facility]  Referral Comments: [address] [place] [address] Home Care         Treatment Team Recommendation: Other (TBD)  Discharge Destination Plan[de-identified] Home with [place] at Discharge : [facility] Ambulance             Patient/caregiver received discharge checklist   Content reviewed  Patient/caregiver encouraged to participate in discharge plan of care prior to discharge home  CM reviewed d/c planning process including the following: identifying help at home, patient preference for d/c planning needs, Discharge Lounge, [facility] Meds to Bed program, availability of treatment team to discuss questions or concerns patient and/or family may have regarding understanding medications and recognizing signs and symptoms once discharged  CM also encouraged patient to follow up with all recommended appointments after discharge  Patient advised of importance for patient and family to participate in managing patients medical well being  Additional Comments: Patient lives with daughter & granddaughter, bed/bathroom on second floor  She has difficulty managing the steps in the home so often stays upstairs most of the day while family is at work  Patient has had rehab in the past and does not want to return, would prefer to go home with home health (requested a referral to [address] which she had in the past)    Daughter Marcelino Koroma, who works at [employer] in [facility], requested a list of private pay caregivers, which was e-mailed to her at [facility]  [email or web address]  Also discussed a referral at discharge to [address] on Aging to begin an assessment to see what services patient would be eligible for in the community  CM to follow

## 2022-07-11 NOTE — SEPSIS NOTE
Sepsis Note   Alyson Jeter 66 y o  female MRN: [id number]  Unit/Bed#: ED 14 Encounter: [id number]       qSOFA     Row Name 07/10/22 2200 07/10/22 2145 07/10/22 2115 07/10/22 2100 07/10/22 2056    Altered mental status GCS < 15 -- -- -- -- 1    Respiratory Rate > / =22 -- 1 -- -- --    Systolic BP < / =747 0 0 0 0 --    Q Sofa Score 2 2 2 2 2    Row Name 07/10/22 2042 07/10/22 2041             Altered mental status GCS < 15 1 --       Respiratory Rate > / =22 -- 1       Systolic BP < / =438 -- 0       Q Sofa Score 2 1                  Initial Sepsis Screening     Row Name 07/10/22 2213                Is the patient's history suggestive of a new or worsening infection? Yes (Proceed)  -CL        Suspected source of infection suspect infection, source unknown  -CL        Are two or more of the following signs & symptoms of infection both present and new to the patient? Yes (Proceed)  -CL        Indicate SIRS criteria Hyperthemia > 38 3C (100 9F); Tachycardia > 90 bpm  -CL        If the answer is yes to both questions, suspicion of sepsis is present --        If severe sepsis is present AND tissue hypoperfusion perists in the hour after fluid resuscitation or lactate > 4, the patient meets criteria for SEPTIC SHOCK --        Are any of the following organ dysfunction criteria present within 6 hours of suspected infection and SIRS criteria that are NOT considered to be chronic conditions? Yes  -CL        Organ dysfunction Lactate > 2 0 mmol/L  -CL        Date of presentation of severe sepsis 07/10/22  -CL        Time of presentation of severe sepsis 2213  -CL        Tissue hypoperfusion persists in the hour after crystalloid fluid administration, evidenced, by either: --        Was hypotension present within one hour of the conclusion of crystalloid fluid administration?  No  -CL        Date of presentation of septic shock --        Time of presentation of septic shock --              User Key  (r) = Recorded By, (t) = Taken By, (c) = Cosigned By    [address] Name Provider Janel Banks MD Resident

## 2022-07-11 NOTE — ASSESSMENT & PLAN NOTE
POA as evidenced by creatinine of 1 44  Improving today to 1 26 with intravenous fluids  Likely prerenal in the setting of sepsis/poor oral intake  · Hold HCTZ  · Status post intravenous fluids, will hold off on further  · Avoid nephrotoxins and hypotension  · BMP in a m

## 2022-07-11 NOTE — WOUND OSTOMY CARE
Consult Note - Wound   Dio Acevedo 66 y o  female MRN: [id number]  Unit/Bed#: [facility] 201-01 Encounter: [id number]        History and Present Illness:  Patient is 67 yo female admitted [facility] with sepsis  Patient is continent of bowel and bladder  Patient is min assist with turning side to side  Patient is independent with meals  Assessment Findings:     1  MASD sacral slit- small partial thickness skin loss with pink tissue, no drainage  2  MASD vs friction right buttocks- small linear area with partial thickness skin loss in one area  3  Pannus- patient keeps a lot of moisture in this area, all skin is intact, inter dry ordered  4  Skin discoloration to abdomen and thighs- dermatology consulted for patient, nourishing cream applied  No induration, fluctuance, odor, warmth/temperature differences, redness, or purulence noted to the above noted wounds and skin areas assessed  Patient tolerated well- no s/s of non-verbal pain or discomfort observed during the encounter  Bedside nurse aware of plan of care  See flow sheets for more detailed assessment findings  Wound care will continue to follow  Skin care plans:  1-Hydraguard to bilateral heels BID and PRN  2-Elevate heels to offload pressure  3-Ehob cushion in chair when out of bed  4-Moisturize skin daily with skin nourishing cream   5-Turn/reposition q2h or when medically stable for pressure re-distribution on skin  6-Cleanse b/l pannus with soap and water, pat dry, Apply interdry to bilateral pannus, may leave in place with up to 5 days if not soiled  Leave 2 inches of material outside of the crease of skin  May wash and dry and reuse for patient    7-Apply stoma powder and then Calazime paste to sacrum/buttocks TID and PRN     Wounds:  Wound 07/11/22 Sacrum (Active)   Wound Image   07/11/22 1218   Wound Description Beefy red 07/11/22 1218   Maia-wound Assessment Hyperpigmented;Scaly 07/11/22 1218   Wound Length (cm) 3 cm 07/11/22 1218 Wound Width (cm) 0 5 cm 07/11/22 1218   Wound Depth (cm) 0 1 cm 07/11/22 1218   Wound Surface Area (cm^2) 1 5 cm^2 07/11/22 1218   Wound Volume (cm^3) 0 15 cm^3 07/11/22 1218   Calculated Wound Volume (cm^3) 0 15 cm^3 07/11/22 1218   Change in Wound Size % 70 07/11/22 1218   Tunneling 0 cm 07/11/22 1218   Tunneling in depth located at 0 07/11/22 1218   Undermining 0 07/11/22 1218   Undermining is depth extending from 0 07/11/22 1218   Wound Site Closure TANYA 07/11/22 1218   Drainage Amount None 07/11/22 1218   Non-staged Wound Description Partial thickness 07/11/22 1218   Treatments Cleansed 07/11/22 1218   Dressing Moisture barrier 07/11/22 1218   Wound packed? No 07/11/22 1218   Packing- # removed 0 07/11/22 5395   Packing- # inserted 0 07/11/22 1218   Dressing Changed New 07/11/22 1218   Patient Tolerance Tolerated well 07/11/22 1218   Dressing Status Clean;Dry; Intact 07/11/22 1218       Wound 07/11/22 Abrasion(s) Buttocks Right (Active)   Wound Image   07/11/22 1218   Wound Description Epithelialization;Fragile;Pink 07/11/22 1218   Wound Length (cm) 4 cm 07/11/22 1218   Wound Width (cm) 8 cm 07/11/22 1218   Wound Depth (cm) 0 1 cm 07/11/22 1218   Wound Surface Area (cm^2) 32 cm^2 07/11/22 1218   Wound Volume (cm^3) 3 2 cm^3 07/11/22 1218   Calculated Wound Volume (cm^3) 3 2 cm^3 07/11/22 1218   Tunneling 0 cm 07/11/22 1218   Tunneling in depth located at 0 07/11/22 1218   Undermining 0 07/11/22 1218   Undermining is depth extending from 0 07/11/22 1218   Wound Site Closure TANYA 07/11/22 1218   Drainage Amount None 07/11/22 1218   Non-staged Wound Description Partial thickness 07/11/22 1218   Treatments Cleansed 07/11/22 1218   Dressing Moisture barrier 07/11/22 1218   Wound packed? No 07/11/22 1218   Packing- # removed 0 07/11/22 2157   Packing- # inserted 0 07/11/22 1218   Dressing Changed New 07/11/22 1218   Patient Tolerance Tolerated well 07/11/22 1218   Dressing Status Clean;Dry; Intact 07/11/22 1218 Delisa BALLARDN, RN, [facility]

## 2022-07-11 NOTE — DISCHARGE INSTR - OTHER ORDERS
Skin care plans:  1-Hydraguard to bilateral heels BID and PRN  2-Elevate heels to offload pressure  3-Ehob cushion in chair when out of bed  4-Moisturize skin daily with skin nourishing cream   5-Turn/reposition q2h or when medically stable for pressure re-distribution on skin  6-Cleanse b/l pannus with soap and water, pat dry, Apply interdry to bilateral pannus, may leave in place with up to 5 days if not soiled  Leave 2 inches of material outside of the crease of skin  May wash and dry and reuse for patient    7-Apply stoma powder and then Calazime paste to sacrum/buttocks TID and PRN

## 2022-07-11 NOTE — ASSESSMENT & PLAN NOTE
Secondary to sepsis  Put on hold diuretics  Sepsis fluids  Recheck metabolic profile in the morning

## 2022-07-11 NOTE — PROGRESS NOTES
[facility]  Progress Note - Joy Barber 1950, 66 y o  female MRN: [id number]  Unit/Bed#: [facility] 201-01 Encounter: [id number]  Primary Care Provider: Eileen Ferguson DO   Date and time admitted to hospital: 7/10/2022  8:07 PM    * Sepsis [facility])  Assessment & Plan  Patient presented with fevers, fatigue  One episode of loose stools prior to presentation  SIRS versus sepsis POA as evidenced by fevers, tachycardia, tachypnea with leukocytosis  Infection versus inflammatory response  · CT abdomen pelvis negative for acute pathology  · Urinalysis negative  · Blood cultures pending  · Of note, ran out of prednisone approximately 1 week ago, now with pustular psoriasis flare  Has history of sepsis secondary to dermatitis  · Currently on intravenous cefepime/vanco, Infectious Disease consult pending  · Additionally to be seen by Rheumatology and Dermatology  · Trend temp/white blood cell count      Acute renal failure (ARF) ([facility] [address] [address] )  Assessment & Plan  POA as evidenced by creatinine of 1 44  Improving today to 1 26 with intravenous fluids  Likely prerenal in the setting of sepsis/poor oral intake  · Hold HCTZ  · Status post intravenous fluids, will hold off on further  · Avoid nephrotoxins and hypotension  · BMP in a m  Elevated troponin  Assessment & Plan  Flat  No chest pain  Likely in setting of sepsis  Historically troponins were elevated with sepsis  · Monitor    Pustular psoriasis  Assessment & Plan  Chronically on prednisone 5 mg p o  B i d , continue  · Continue emollient creams and moisturizers as well as steroid cream  · Rheumatology and dermatology consult pending    Paroxysmal atrial fibrillation [facility])  Assessment & Plan  Continue beta-blocker and Eliquis for anticoagulation  Benign essential hypertension  Assessment & Plan  Blood pressure acceptable  Continue beta-blocker  Hold HCTZ      Rheumatoid arthritis (HCC)  Assessment & Plan  Known to   Lud[place]    · Continue Prednisone 5 mg BID and Plaquenil  · Rheumatology consult ordered on admission, pending  GERD (gastroesophageal reflux disease)  Assessment & Plan  · Continue pantoprazole  Vitamin D deficiency  Assessment & Plan  · Continue cholecalciferol  Abnormal CT of the chest  Assessment & Plan  Nodular opacity in the right lower lobe without a correlate on the prior study may represent a vessel or nodule  · Follow up chest CT is advised in 6-12 months  VTE Pharmacologic Prophylaxis:   Moderate Risk (Score 3-4) - Pharmacological DVT Prophylaxis Ordered: apixaban (Eliquis)  Patient Centered Rounds: I performed bedside rounds with nursing staff today  Discussions with Specialists or Other Care Team Provider: nursing, case management     Education and Discussions with Family / Patient: Updated  (daughter) via phone  Time Spent for Care: 30 minutes  More than 50% of total time spent on counseling and coordination of care as described above  Current Length of Stay: 1 day(s)    Current Patient Status: Inpatient     Certification Statement: The patient will continue to require additional inpatient hospital stay due to sepsis, multiple consults     Discharge Plan: Anticipate discharge in 48-72 hrs to TBD pending rehab evals    Code Status: Level 1 - Full Code    Subjective:   Patient reports that her skin had been clear up until approximately 1 week ago when she ran out of prednisone, has had rapid progression of rash to bilateral upper extremities, chest and face  Reports feeling very fatigued with some subjective fevers  Objective:     Vitals:   Temp (24hrs), Av 1 °F (37 8 °C), Min:97 5 °F (36 4 °C), Max:102 1 °F (38 9 °C)    Temp:  [97 5 °F (36 4 °C)-102 1 °F (38 9 °C)] 97 5 °F (36 4 °C)  HR:  [] 82  Resp:  [13-30] 13  BP: ()/(43-91) 136/59  SpO2:  [93 %-100 %] 100 %  There is no height or weight on file to calculate BMI       Input and Output Summary (last 24 hours): Intake/Output Summary (Last 24 hours) at 7/11/2022 0924  Last data filed at 7/11/2022 0019  Gross per 24 hour   Intake 4300 ml   Output --   Net 4300 ml       Physical Exam:   Physical Exam  Vitals and nursing note reviewed  Constitutional:       Appearance: She is obese  Cardiovascular:      Rate and Rhythm: Normal rate  Heart sounds: Murmur heard  Pulmonary:      Breath sounds: Normal breath sounds  Abdominal:      Tenderness: There is no abdominal tenderness  Musculoskeletal:         General: No swelling  Skin:     General: Skin is warm  Comments: Psoriatic rash to bilateral arms, chest, abdomen, face   Neurological:      Mental Status: She is alert and oriented to person, place, and time  Mental status is at baseline     Psychiatric:         Mood and Affect: Mood normal           Additional Data:     Labs:  Results from last 7 days   Lab Units 07/11/22  0513   WBC Thousand/uL 9 54   HEMOGLOBIN g/dL 13 5   HEMATOCRIT % 44 3   PLATELETS Thousands/uL 153   NEUTROS PCT % 94*   LYMPHS PCT % 3*   MONOS PCT % 3*   EOS PCT % 0     Results from last 7 days   Lab Units 07/11/22  0625   SODIUM mmol/L 142   POTASSIUM mmol/L 4 5   CHLORIDE mmol/L 113*   CO2 mmol/L 17*   BUN mg/dL 19   CREATININE mg/dL 1 26   ANION GAP mmol/L 12   CALCIUM mg/dL 8 5   ALBUMIN g/dL 2 4*   TOTAL BILIRUBIN mg/dL 1 48*   ALK PHOS U/L 52   ALT U/L 26   AST U/L 34   GLUCOSE RANDOM mg/dL 78     Results from last 7 days   Lab Units 07/10/22  2059   INR  1 26*             Results from last 7 days   Lab Units 07/11/22  0619 07/11/22  0513 07/11/22  0128 07/10/22  2329 07/10/22  2059   LACTIC ACID mmol/L  --  1 5 2 1* 2 7* 3 9*   PROCALCITONIN ng/ml 0 53*  --   --   --  0 15       Lines/Drains:  Invasive Devices  Report    Peripheral Intravenous Line  Duration           Peripheral IV 07/10/22 Left Arm <1 day    Peripheral IV 07/10/22 Right Wrist <1 day          Drain  Duration           Urethral Catheter 18 Fr  <1 day              Urinary Catheter:  Goal for removal: No longer needed  Will place order to discontinue               Imaging: Reviewed radiology reports from this admission including: chest CT scan and CT head    Recent Cultures (last 7 days):   Results from last 7 days   Lab Units 07/10/22  2136 07/10/22  2059   BLOOD CULTURE  Received in Microbiology Lab  Culture in Progress  Received in Microbiology Lab  Culture in Progress  Last 24 Hours Medication List:   Current Facility-Administered Medications   Medication Dose Route Frequency Provider Last Rate    acetaminophen  650 mg Oral Q6H PRN Lester Ruiz MD      apixaban  5 mg Oral BID Lester Ruiz MD      cefepime  2,000 mg Intravenous Q12H Lester Ruiz MD      cholecalciferol  1,000 Units Oral Daily Lester Ruiz MD      clobetasol   Topical BID Lester Ruiz MD      clotrimazole   Topical BID Lester Ruiz MD      gabapentin  100 mg Oral Daily Lester Ruiz MD      gabapentin  300 mg Oral HS Lester Ruiz MD      hydroxychloroquine  200 mg Oral BID Lester Ruiz MD      metoprolol tartrate  25 mg Oral Q12H [address] Lester Ruiz MD      ondansetron  4 mg Intravenous Q6H PRN Lester Ruiz MD      pantoprazole  20 mg Oral Early Morning Lester Ruiz MD      predniSONE  5 mg Oral BID With Meals Lester Ruiz MD      vancomycin  15 mg/kg (Adjusted) Intravenous Q24H Lester Ruiz MD      white petrolatum-mineral oil   Topical TID Lester Ruiz MD          Today, Patient Was Seen By: Lige Rinne, CRNP    **Please Note: This note may have been constructed using a voice recognition system  **

## 2022-07-11 NOTE — ASSESSMENT & PLAN NOTE
Chronically on prednisone 5 mg p o  B i d , continue  · Continue emollient creams and moisturizers as well as steroid cream  · Rheumatology and dermatology consult pending

## 2022-07-12 VITALS
OXYGEN SATURATION: 100 % | RESPIRATION RATE: 18 BRPM | TEMPERATURE: 97.4 F | HEART RATE: 75 BPM | SYSTOLIC BLOOD PRESSURE: 155 MMHG | DIASTOLIC BLOOD PRESSURE: 77 MMHG

## 2022-07-12 PROBLEM — A41.9 SEPSIS (HCC): Status: RESOLVED | Noted: 2022-01-10 | Resolved: 2022-07-12

## 2022-07-12 PROBLEM — N17.9 ACUTE RENAL FAILURE (ARF) (HCC): Status: RESOLVED | Noted: 2022-01-10 | Resolved: 2022-07-12

## 2022-07-12 PROCEDURE — 99232 SBSQ HOSP IP/OBS MODERATE 35: CPT | Performed by: INTERNAL MEDICINE

## 2022-07-12 PROCEDURE — 97167 OT EVAL HIGH COMPLEX 60 MIN: CPT

## 2022-07-12 PROCEDURE — 97163 PT EVAL HIGH COMPLEX 45 MIN: CPT

## 2022-07-12 PROCEDURE — NC001 PR NO CHARGE: Performed by: DERMATOLOGY

## 2022-07-12 PROCEDURE — 99239 HOSP IP/OBS DSCHRG MGMT >30: CPT | Performed by: NURSE PRACTITIONER

## 2022-07-12 RX ORDER — TRIAMCINOLONE ACETONIDE 1 MG/G
1 CREAM TOPICAL 2 TIMES DAILY
Qty: 28 G | Refills: 0 | Status: SHIPPED | OUTPATIENT
Start: 2022-07-12 | End: 2022-07-26

## 2022-07-12 RX ORDER — PREDNISONE 1 MG/1
5 TABLET ORAL 2 TIMES DAILY WITH MEALS
Status: DISCONTINUED | OUTPATIENT
Start: 2022-07-12 | End: 2022-07-12 | Stop reason: HOSPADM

## 2022-07-12 RX ORDER — PREDNISONE 1 MG/1
5 TABLET ORAL 2 TIMES DAILY WITH MEALS
Qty: 60 TABLET | Refills: 0 | Status: SHIPPED | OUTPATIENT
Start: 2022-07-12

## 2022-07-12 RX ADMIN — PANTOPRAZOLE SODIUM 20 MG: 20 TABLET, DELAYED RELEASE ORAL at 05:42

## 2022-07-12 RX ADMIN — APIXABAN 5 MG: 5 TABLET, FILM COATED ORAL at 17:07

## 2022-07-12 RX ADMIN — CLOTRIMAZOLE: 1 CREAM TOPICAL at 17:08

## 2022-07-12 RX ADMIN — PREDNISONE 5 MG: 5 TABLET ORAL at 17:07

## 2022-07-12 RX ADMIN — APIXABAN 5 MG: 5 TABLET, FILM COATED ORAL at 10:43

## 2022-07-12 RX ADMIN — GABAPENTIN 100 MG: 100 CAPSULE ORAL at 10:43

## 2022-07-12 RX ADMIN — METOPROLOL TARTRATE 25 MG: 25 TABLET, FILM COATED ORAL at 10:43

## 2022-07-12 RX ADMIN — Medication: at 17:08

## 2022-07-12 RX ADMIN — PREDNISONE 5 MG: 5 TABLET ORAL at 10:48

## 2022-07-12 RX ADMIN — Medication 1000 UNITS: at 10:44

## 2022-07-12 RX ADMIN — HYDROXYCHLOROQUINE SULFATE 200 MG: 200 TABLET ORAL at 17:08

## 2022-07-12 RX ADMIN — TRIAMCINOLONE ACETONIDE: 1 OINTMENT TOPICAL at 17:08

## 2022-07-12 RX ADMIN — CLOBETASOL PROPIONATE: 0.5 CREAM TOPICAL at 17:08

## 2022-07-12 NOTE — ASSESSMENT & PLAN NOTE
Patient presented with fevers, fatigue  One episode of loose stools prior to presentation  SIRS versus sepsis POA as evidenced by fevers, tachycardia, tachypnea with leukocytosis  Infection versus inflammatory response  · CT abdomen pelvis negative for acute pathology  · Urinalysis negative  · Blood cultures no growth to date   · Of note, ran out of prednisone approximately 1 week ago, now with pustular psoriasis flare  Has history of sepsis secondary to dermatitis    · Currently on intravenous cefepime/vanco, Infectious Disease consult pending  · Additionally to be seen by Rheumatology and Dermatology  · Trend temp/white blood cell count

## 2022-07-12 NOTE — PHYSICAL THERAPY NOTE
Physical Therapy Evaluation    Patient's Name: Fabby Porras    Admitting Diagnosis  Anisocoria [H57 02]  Dehydration [E86 0]  Hyperkalemia [E87 5]  Acute abdominal pain [R10 9]  Rash [R21]  Pustular psoriasis [L40 1]  Fever [R50 9]  AMANDA (acute kidney injury) ([facility] ) [N17 9]  Acute encephalopathy [G93 40]  Severe sepsis ([facility] ) [A41 9, R65 20]  Acute leg pain, left [M79 605]    Problem List  Patient Active Problem List   Diagnosis    Rheumatoid arthritis ([facility] )    GERD (gastroesophageal reflux disease)    Sarcoid    Cough    Benign essential hypertension    Morbid obesity (HCC)    Lumbar radiculopathy    Osteoporosis    Vitamin D deficiency    Erythroderma    BPPV (benign paroxysmal positional vertigo)    Seasonal allergic rhinitis due to pollen    Morbid obesity with BMI of 40 0-44 9, adult ([facility] )    Hyperparathyroidism ([facility] )    Murmur, cardiac    Paroxysmal atrial fibrillation ([facility] )    COVID-19    Elevated troponin    Pustular psoriasis    Abnormal CT of the chest       Past Medical History  Past Medical History:   Diagnosis Date    Abnormal thyroid function test     last assessed: 2015     Arthritis     Caries     last assessed: 2016     Continuous opioid dependence ([facility] ) 2021    Edema of right lower extremity     last assessed: 2015     GERD (gastroesophageal reflux disease)     Hypertension     Medicare annual wellness visit, subsequent 2021    Positive blood culture 3/11/2022    Sarcoid        Past Surgical History  Past Surgical History:   Procedure Laterality Date     SECTION      MULTIPLE TOOTH EXTRACTIONS N/A 2016    Procedure: Surgical extraction of teeth 2, 18, 19, 30, 31; incision and drainage of left subperiosteal abscess ;  Surgeon: Kwaku Chew DMD;  [facility]: [facility];  Service:         22 1115   PT Last Visit   PT Visit Date 22   Note Type   Note type Evaluation   Pain Assessment   Pain Assessment Tool 0-10   Pain Score No Pain Restrictions/Precautions   Weight Bearing Precautions Per Order No   Other Precautions Chair Alarm; Bed Alarm; Fall Risk   Home Living   Type of [address] Two level;Performs ADLs on one level  (FF to bedroom/bathroom, once upstairs, pt remains on 2nd floor)   Home Equipment Walker;Cane   Prior Function   Level of [place] Independent with ADLs and functional mobility; Needs assistance with IADLs   Lives With Daughter  (+ granddaughter)   Receives Help From Family   ADL Assistance Independent   IADLs Needs assistance   Falls in the last 6 months 1 to 4   Comments Pt states she uses RW or cane within the home depending on the day, states she ambulates short household distances, family sets her up prior to leaving for work with meals   General   Family/Caregiver Present No   Cognition   Overall Cognitive Status WFL   Attention Attends with cues to redirect   Orientation Level Oriented X4   Following Commands Follows one step commands with increased time or repetition   Comments Pt pleasant and cooperative, requires cues for safety   RLE Assessment   RLE Assessment WFL  (Grossly 3+/5)   LLE Assessment   LLE Assessment WFL  (Grossly 3+/5)   Light Touch   RLE Light Touch Grossly intact   LLE Light Touch Grossly intact   Bed Mobility   Additional Comments Pt OOB in chair upon PT arrival   Transfers   Sit to Stand 4  Minimal assistance   Additional items Assist x 1; Increased time required;Verbal cues   Stand to Sit 5  Supervision   Additional items Assist x 1; Increased time required;Verbal cues   Additional Comments with RW, VC's for hand placement   Ambulation/Elevation   Gait pattern Wide CARMEN; Decreased foot clearance; Forward Flexion; Short stride; Excessively slow   Gait Assistance 4  Minimal assist   Additional items Assist x 1   Assistive Device Rolling walker   Distance 20 ft, limited by fatigue   Stair Management Assistance (S)  Not tested  (Pt declined stating "they carry me in and out, I don't like to climb them "  Pt able to perform high march with RW to simulate with close supervision )   Balance   Static Sitting Fair   Dynamic Sitting Fair -   Static Standing Fair -   Dynamic Standing Poor +   Ambulatory Fair -   Activity Tolerance   Activity Tolerance Patient limited by fatigue   Nurse Made Aware RN updated  Chair alarm engaged at end of session   Assessment   Prognosis Good   Problem List Decreased strength;Decreased endurance; Impaired balance;Decreased mobility; Decreased safety awareness   Assessment Pt is a 66 y o  female seen for PT evaluation s/p admit to [facility] on 7/10/2022  Pt was admitted with a primary dx of: Sepsis  PT now consulted for assessment of mobility and d/c needs  Pts current comorbidities and personal factors effecting treatment include: HTN, Morbid Obesity, Afib, CKD, RA, resides on 2nd floor of home  Pts current clinical presentation is Unstable/Unpredictable (high complexity) due to Ongoing medical management for primary dx, Decreased activity tolerance compared to baseline, Fall risk, Increased assistance needed from caregiver at current time, Trending lab values  Prior to admission, pt was independent with use of SC or RW depending on the day  Upon evaluation, pt currently is requiring  Monica for transfers and Supervision for ambulation 20 ft w/ RW  Pt presents at PT eval functioning below baseline and currently w/ overall mobility deficits 2* to: BLE weakness, impaired balance, decreased endurance, gait deviations, decreased activity tolerance compared to baseline, decreased functional mobility tolerance compared to baseline, fall risk  Pt currently at a fall risk 2* to impairments listed above  Pt will continue to benefit from skilled acute PT interventions to address stated impairments; to maximize functional mobility; for ongoing pt/ family training; and DME needs   At conclusion of PT session pt returned back in chair and chair alarm engaged with phone and call bell within reach  Pt denies any further questions at this time  Recommend home with family care and HHPT upon hospital D/C, pending family support and clarification of stair climbing at baseline/transportation  Barriers to Discharge Inaccessible home environment   Goals   Patient Goals to go home   STG Expiration Date 07/26/22   Short Term Goal #1 In 14 days pt will be able to: 1  Demonstrate ability to perform all aspects of bed mobility independently to improve functional safety  2  Perform functional transfers with LRAD independently to facilitate safe return to previous living environment  3   Ambulate 100 ft with LRAD independently with stable vitals to improve safety with household distances and reduce fall risk  4  Improve LE strength grades by 1 to increase ease of functional mobility with transfers and gait  5  Pt will demonstrate improved balance by one grade in order to decrease risk of falls  6  Climb 12 steps with 1 HR and supervision to simulate entrance to home  PT Treatment Day 0   Plan   Treatment/Interventions Functional transfer training;LE strengthening/ROM; Elevations; Therapeutic exercise; Endurance training;Patient/family training;Equipment eval/education;Gait training;Bed mobility   PT Frequency 3-5x/wk   Recommendation   PT Discharge Recommendation Home with home health rehabilitation   Equipment Recommended   (pt owns all DME)   AM-PAC Basic Mobility Inpatient   Turning in Bed Without Bedrails 3   Lying on Back to Sitting on Edge of Flat Bed 3   Moving Bed to Chair 3   Standing Up From Chair 3   Walk in Room 3   Climb 3-5 Stairs 2   Basic Mobility Inpatient Raw Score 17   Basic Mobility Standardized Score 39 67   Highest Level Of Mobility   JH-HLM Goal 5: Stand one or more mins   JH-HLM Achieved 6: Walk 10 steps or more       Dante Fleming, PT, DPT, [facility] details…

## 2022-07-12 NOTE — CASE MANAGEMENT
Case Management Discharge Planning Note    Patient name Gerald Pitts  Location /-00 MRN [id number]  : 1950 Date 2022       Current Admission Date: 7/10/2022  Current Admission Diagnosis:Benign essential hypertension   Patient Active Problem List    Diagnosis Date Noted    Abnormal CT of the chest 2022    Pustular psoriasis 2022    Elevated troponin 2022    COVID-19 2022    Paroxysmal atrial fibrillation ([facility] ) 01/10/2022    Hyperparathyroidism ([facility] ) 2021    Murmur, cardiac 2021    Morbid obesity with BMI of 40 0-44 9, adult ([facility] ) 2019    BPPV (benign paroxysmal positional vertigo) 2018    Seasonal allergic rhinitis due to pollen 2018    Cough 2018    Erythroderma 10/27/2017    Osteoporosis 2016    Rheumat[facility] ([facility] ) 2016    GERD (gastroesophageal reflux disease) 2016    Sarcoid 2016    Morbid obesity [facility] ) 2016    Vitamin D deficiency 2015    Benign essential hypertension 2014    Lumbar radiculopathy 2014      LOS (days): 2  Geometric Mean LOS (GMLOS) (days):   Days to GMLOS:     OBJECTIVE:  Risk of Unplanned Readmission Score: 23 57         Current admission status: Inpatient   Preferred Pharmacy:   [id number] [address] [place] [id number]  Phone: [phone] Fax: [phone], [facility] [address]  Phone: [phone] Fax: [phone]    Primary Care Provider: Srikanth Menchaca 173, DO    Primary Insurance: [facility]  Secondary Insurance:     DISCHARGE DETAILS:    Discharge planning discussed with[de-identified] Margoth Huang (daughter)     Comments - [facility]: [facility]st [facility] accepted  Pt's daughter stated patient needs transprot home and carrield up 13 steps to get to the living area   SLETS arranged 8pm transport Daughter updated                                              Discharge Destination Plan[de-identified] Home with [facility]

## 2022-07-12 NOTE — INCIDENTAL FINDINGS
The following findings require follow up:  Radiographic finding   Finding: Nodular opacity in the right lower lobe without a correlate on the prior study may represent a vessel or nodule   Follow up required: CT chest 6-12 months   Follow up should be done within 6 month(s)    Please notify the following clinician to assist with the follow up:   Dr Ernesto Escalante

## 2022-07-12 NOTE — DISCHARGE SUMMARY
Discharge Summary - [facility]    Patient Information: Ai Nazario 66 y o  female MRN: [id number]  Unit/Bed#: -01 Encounter: [id number]    Discharging Physician / Practitioner: AZIZA Watts  PCP: Juan Ramon Guadalupe DO  Admission Date: 7/10/2022  Discharge Date: 07/12/22    Reason for Admission: SIRS 2/2 pustular psoriasis flare     Discharge Diagnoses:     Principal Problem (Resolved):    Sepsis ([facility] )  Active Problems:    Elevated troponin    Paroxysmal atrial fibrillation (HCC)    Pustular psoriasis    Benign essential hypertension    Rheumatoid arthritis ([facility] )    GERD (gastroesophageal reflux disease)    Vitamin D deficiency    Abnormal CT of the chest  Resolved Problems:    Acute renal failure (ARF) ([place] [address] )      Consultations During Hospital Stay:  · Dermatology  · ID  · Case management     Procedures Performed:     · None     Significant Findings / Test Results:     VAS lower limb venous duplex study, complete bilateral   Final Result by Chandrakant Carrera MD (07/11 1004)      CT head without contrast   Final Result by Bhavna Umaña MD (07/11 0700)      No acute intracranial abnormality  I agree with preliminary report provided by [facility]  Workstation performed: [id number]         CT chest abdomen pelvis wo contrast   Final Result by Stevie Quintero MD (07/11 4030)      No acute abdominopelvic pathology  Nodular opacity in the right lower lobe without a correlate on the prior study may represent a vessel or nodule  Follow up chest CT is advised in 6-12 months  Workstation performed: [id number]         · BC negative  · UA negative  · COVID negative    Incidental Findings:   · Nodular opacity in the right lower lobe without a correlate on the prior study may represent a vessel or nodule  · Recommend repeat CT chest in 6-12 months for f/u      Test Results Pending at Discharge (will require follow up):    · End points blood cultures      Outpatient Tests Requested:  · Outpatient f/u with PCP  · OUtpatient f/u with dermatology  · Outpatient f/u with rheumatology     Complications:  None     Hospital Course:     Henry Henriquez is a 66 y o  female patient with a PMHx of pustular psoriasis, PAF, HTN RA, GERD, vitamin D deficiency who originally presented to the hospital on 7/10/2022 due to fevers, myalgias and chills  Met SIRS criteria  Reportedly ran out of prednisone appx one week ago for her pustular psoriasis and developed worsening rash  Infectious work up negative  Was seen by ID and dermatology who agreed that SIRS likely 2/2 inflammatory response from pustular psoriasis flare  Prednisone resumed  Has remained afebrile and feels better overall  Stable for discharge home today  Prenisone refilled  Should f/u with her PCP, outpatient rheumatologist as well as dermatology  Needs biologic when seen in outpatient setting  Condition at Discharge: stable     Discharge Day Visit / Exam:     Subjective:  Patient offers no acute complaints  Feels well  No subjective fevers, chills  Vitals: Blood Pressure: (!) 139/111 (07/12/22 0711)  Pulse: 74 (07/12/22 0711)  Temperature: 97 9 °F (36 6 °C) (07/12/22 0711)  Temp Source: Oral (07/11/22 2025)  Respirations: 18 (07/11/22 2025)  SpO2: 98 % (07/12/22 0711)  Exam:   Physical Exam  Vitals and nursing note reviewed  Constitutional:       Appearance: She is obese  Cardiovascular:      Rate and Rhythm: Normal rate  Pulmonary:      Breath sounds: Normal breath sounds  Abdominal:      Tenderness: There is no abdominal tenderness  Musculoskeletal:         General: No swelling  Skin:     General: Skin is warm  Findings: Rash (bilateral arms, chest, chin) present  Neurological:      Mental Status: She is alert and oriented to person, place, and time  Mental status is at baseline     Psychiatric:         Mood and Affect: Mood normal          Discussion with Family: patient and message left for daughter  Discharge instructions/Information to patient and family:   See after visit summary for information provided to patient and family  Provisions for Follow-Up Care:  See after visit summary for information related to follow-up care and any pertinent home health orders  Disposition:     Home    For Discharges to [facility]:   · Not Applicable to this Patient - Not Applicable to this Patient    Planned Readmission: no     Discharge Statement:  I spent 40 minutes discharging the patient  This time was spent on the day of discharge  I had direct contact with the patient on the day of discharge  Greater than 50% of the total time was spent examining patient, answering all patient questions, arranging and discussing plan of care with patient as well as directly providing post-discharge instructions  Additional time then spent on discharge activities  Discharge Medications:  See after visit summary for reconciled discharge medications provided to patient and family        ** Please Note: This note has been constructed using a voice recognition system **

## 2022-07-12 NOTE — CONSULTS
DERMATOLOGY:  CONSULTATION   Carter Ruiz 66 y o  female MRN: [id number]  Unit/Bed#: -01 Encounter: [id number]      Consults        Assessment/Recommendations   Based on a thorough discussion of this condition and the management approach to it (including a comprehensive discussion of the known risks, side effects and potential benefits of treatment), the patient (family) agrees to implement the following specific plan:    1  Acute Flare of Pustular Psoriasis:     I suspect the patient is in acute pustular psoriasis flare  She abruptly stopped prednisone for rheumatoid arthritis which can induce a flare  In acute pustular psoriasis flares patients can also experience systemic symptoms which are seen in our patient, including but not limited to fevers, fatigue, generalized myalgias, altered mental status  Patient met SIRS criteria (fevers- likely due to acute flare; leukocytosis-likely secondary to steroid response)  Therefore, less likely infectious etiology at this time  CT chest/abdomen/pelvis without acute pathology, negative blood cultures, and unremarkable urinalysis  Patient and family understand the importance of close outpatient follow-up for management of pustular psoriasis  Patient's daughter states that transportation is limiting factor  Will work with our team and see if we can arrange for transportation  In the meantime, the patient can continue topical triamcinolone 0 1% ointment twice daily for 14 days duration and restart 5mg prednisone for rheumatoid arthritis  Would recommend any additional doses of pulse steroids as this can worsen future pustular psoriasis flares  Plan to place the patient on biologic medication for pustular psoriasis when seen in outpatient setting  Please set up discharge follow up by calling our office: [phone] ([phone])     Thank you for involving me in the care of your patient   Please call with questions, change in clinical status or if tests recommended above are abnormal      Discussed with the primary service  History:     HISTORY OF PRESENT ILLNESS:    Reason for Consult: worsening rash   HPI: mEir Griffin is a 66y o  year old female with biopsy proven pustular psoriasis presenting with worsening rash of 1 week duration since abrupt discontinuation of baseline 5 mg prednisone that she takes for her rheumatoid arthritis  Since then she has complained of subjective fevers, chills, myalgias, and fatigue  Upon admission patient met SIRS criteria (fevers, tachycardia, tachypnea, and leukocytosis)  CT chest/abdomen/pelvis without acute pathology  Admission blood cultures have no growth thus far  ID consulted and have discontinued antibiotics at this time  She was previously seen in consultation at [address] [place] [address]  She was started on topical triamcinolone 0 1% ointment with goals of transitioning her to acitretin in the outpatient setting  There have been multiple documented phone calls attempting to reach the patient  The daughter was present with the patient in the room and asked for her to be the primary contact moving forward  She is interested in bringing the patient into our outpatient clinic for further evaluation and discussion of starting a biologic agent for her pustular psoriasis  They will require transportation  ROS:  ROS is negative otherwise stated in HPI         PAST MEDICAL HISTORY:  Past Medical History:   Diagnosis Date    Abnormal thyroid function test     last assessed: 2015     Arthritis     Caries     last assessed: 2016     Continuous opioid dependence [facility]) 2021    Edema of right lower extremity     last assessed: 2015     GERD (gastroesophageal reflux disease)     Hypertension     Medicare annual wellness visit, subsequent 2021    Positive blood culture 3/11/2022    Sarcoid      Past Surgical History:   Procedure Laterality Date     SECTION      MULTIPLE TOOTH EXTRACTIONS N/A 2016    Procedure: Surgical extraction of teeth 2, 18, 19, 30, 31; incision and drainage of left subperiosteal abscess ;  Surgeon: Venkata Skinner DMD;  Location: [facility];  Service:        FAMILY HISTORY:  Non-contributory    SOCIAL HISTORY:  Social History   Single  Social History     Substance and Sexual Activity   Alcohol Use No     Social History     Substance and Sexual Activity   Drug Use No     Social History     Tobacco Use   Smoking Status Never Smoker   Smokeless Tobacco Never Used       ALLERGIES:  Allergies   Allergen Reactions    Methotrexate Derivatives     Shellfish-Derived Products - Food Allergy Itching    Amoxicillin Rash    Ampicillin-Sulbactam Sodium Rash       MEDICATIONS:  All current active medications have been reviewed         Physical exam:     Temp:  [97 5 °F (36 4 °C)-98 8 °F (37 1 °C)] 98 4 °F (36 9 °C)  HR:  [80-84] 80  Resp:  [13-18] 18  BP: (119-160)/(56-83) 119/57  SpO2:  [96 %-100 %] 99 %  Temp (24hrs), Av 3 °F (36 8 °C), Min:97 5 °F (36 4 °C), Max:98 8 °F (37 1 °C)  Current: Temperature: 98 4 °F (36 9 °C)    PSYCH: Normal mood and affect  EYES: Normal conjunctiva  ENT: Normal lips and oral mucosa  CARDIOVASCULAR: No edema  RESPIRATORY: Normal respirations  HEME/LYMPH/IMMUNO:  No regional lymphadenopathy except as noted below in ASSESSMENT AND PLAN BY DIAGNOSIS    FULL ORGAN SYSTEM SKIN EXAM (SKIN)  Hair, Scalp, Ears, Face Normal except as noted below in Assessment   Neck, Cervical Chain Nodes Normal except as noted below in Assessment   Right Arm/Hand/Fingers Normal except as noted below in Assessment   Left Arm/Hand/Fingers Normal except as noted below in Assessment   Chest/Breasts/Axillae Viewed areas Normal except as noted below in Assessment   Abdomen, Umbilicus Normal except as noted below in Assessment   Back/Spine Normal except as noted below in Assessment   Groin/Genitalia/Buttocks Viewed areas Normal except as noted below in Assessment   Right Leg, Foot, Toes Normal except as noted below in Assessment   Left Leg, Foot, Toes Normal except as noted below in Assessment                        Psoriasiform appearing erythematous plaques with desquamation on the face, torso and extremities  Labs, Imaging, & Other Studies     Lab Results:  I have personally reviewed pertinent labs  Results from last 7 days   Lab Units 07/11/22  0513 07/10/22  2059   WBC Thousand/uL 9 54 12 01*   HEMOGLOBIN g/dL 13 5 14 4   PLATELETS Thousands/uL 153 203     Results from last 7 days   Lab Units 07/11/22  0625 07/10/22  2059   POTASSIUM mmol/L 4 5 5 5*   CHLORIDE mmol/L 113* 108   CO2 mmol/L 17* 22   BUN mg/dL 19 20   CREATININE mg/dL 1 26 1 44*   EGFR ml/min/1 73sq m 42 36   CALCIUM mg/dL 8 5 9 4   AST U/L 34 40   ALT U/L 26 29   ALK PHOS U/L 52 60     Results from last 7 days   Lab Units 07/10/22  2136 07/10/22  2059   BLOOD CULTURE  No Growth at 24 hrs  No Growth at 24 hrs  Pathology:   I have personally reviewed pertinent reports

## 2022-07-12 NOTE — PROGRESS NOTES
Progress Note - Infectious Disease   Vandana Al 66 y o  female MRN: [id number]  Unit/Bed#: -01 Encounter: [id number]      Impression/Recommendations:   1  SIRS, present on admission, most likely secondary to relapse of vasculitis of systemic corticosteroid  There is no obvious active infection  Chest/abdomen/pelvis CT without acute pathology  Patient is clinically and systemically well  Mild leukocytosis most likely secondary to recent steroid effect  Mildly elevated procalcitonin most likely secondary to vasculitis flare  Patient is clinically much improved with systemic corticosteroid restart  Admission blood cultures have no growth thus far  Patient had been on antibiotics but these were discontinued  She remains well off antibiotic  Observe off further antibiotic  Monitor temperature/WBC  Monitor hemodynamics  Follow-up on pending blood cultures        2  Pustular psoriasis, steroid dependent, with flare of systemic corticosteroid  Rash improved with steroid restart  Continue systemic corticosteroid  Rheumatology follow-up        3  Hypotension on admission  Together with AMANDA, patient will most likely dehydrated  SBP has normalized  Patient remains clinically and systemically well  Monitor hemodynamics        4  AMANDA, likely secondary to prerenal state from dehydration  Creatinine improved with IV fluid hydration  Management per primary service        Discussed with patient in detail regarding the above plan  Okay for discharge from ID viewpoint  Antibiotics:  Off antibiotic    Subjective:  Patient feels well  Rash improving  Temperature stays down  No chills  No diarrhea      Objective:  Vitals:  Temp:  [97 9 °F (36 6 °C)-98 8 °F (37 1 °C)] 97 9 °F (36 6 °C)  HR:  [74-92] 92  Resp:  [18] 18  BP: (119-156)/() 156/95  SpO2:  [98 %-100 %] 100 %  Temp (24hrs), Av 4 °F (36 9 °C), Min:97 9 °F (36 6 °C), Max:98 8 °F (37 1 °C)  Current: Temperature: 97 9 °F (36 6 °C)    Physical Exam:     General: Awake, alert, cooperative, no distress  Neck:  Supple  No mass  No lymphadenopathy  Lungs: Expansion symmetric, no rales, no wheezing, respirations unlabored  Heart:  Regular rate and rhythm, S1 and S2 normal, no murmur  Abdomen: Soft, nondistended, non-tender, bowel sounds active all four quadrants, no masses, no organomegaly  Extremities: No edema  No erythema/warmth  No ulcer  Nontender to palpation  Skin:  Improving rash, now with some desquamation  No new rash  Neuro: Moves all extremities  Invasive Devices  Report    Peripheral Intravenous Line  Duration           Peripheral IV 07/10/22 Left Arm 1 day    Peripheral IV 07/10/22 Right Wrist 1 day                Labs studies:   I have personally reviewed pertinent labs  Results from last 7 days   Lab Units 07/11/22  0625 07/10/22  2059   POTASSIUM mmol/L 4 5 5 5*   CHLORIDE mmol/L 113* 108   CO2 mmol/L 17* 22   BUN mg/dL 19 20   CREATININE mg/dL 1 26 1 44*   EGFR ml/min/1 73sq m 42 36   CALCIUM mg/dL 8 5 9 4   AST U/L 34 40   ALT U/L 26 29   ALK PHOS U/L 52 60     Results from last 7 days   Lab Units 07/11/22  0513 07/10/22  2059   WBC Thousand/uL 9 54 12 01*   HEMOGLOBIN g/dL 13 5 14 4   PLATELETS Thousands/uL 153 203     Results from last 7 days   Lab Units 07/10/22  2136 07/10/22  2059   BLOOD CULTURE  No Growth at 24 hrs  No Growth at 24 hrs  Imaging Studies:   I have personally reviewed pertinent imaging study reports and images in PACS  EKG, Pathology, and Other Studies:   I have personally reviewed pertinent reports

## 2022-07-12 NOTE — OCCUPATIONAL THERAPY NOTE
Occupational Therapy Evaluation      Bhavna Al    2022    Active Problems:    Rheumatoid arthritis ([facility] [address] )    GERD (gastroesophageal reflux disease)    Benign essential hypertension    Vitamin D deficiency    Paroxysmal atrial fibrillation (HCC)    Elevated troponin    Pustular psoriasis    Abnormal CT of the chest      Past Medical History:   Diagnosis Date    Abnormal thyroid function test     last assessed: 2015     Arthritis     Caries     last assessed: 2016     Continuous opioid dependence ([facility] [address] ) 2021    Edema of right lower extremity     last assessed: 2015     GERD (gastroesophageal reflux disease)     Hypertension     Medicare annual wellness visit, subsequent 2021    Positive blood culture 3/11/2022    Sarcoid        Past Surgical History:   Procedure Laterality Date     SECTION      MULTIPLE TOOTH EXTRACTIONS N/A 2016    Procedure: Surgical extraction of teeth 2, 18, 19, 30, 31; incision and drainage of left subperiosteal abscess ;  Surgeon: Gem Berman DMD;  Location:  MAIN OR;  Service:         22 0931   OT Last Visit   OT Visit Date 22   Note Type   Note type Evaluation   Restrictions/Precautions   Weight Bearing Precautions Per Order No   Other Precautions Chair Alarm;Multiple lines;Telemetry; Fall Risk   Pain Assessment   Pain Assessment Tool 0-10   Pain Score No Pain   Home Living   Type of Home House   Home Layout Two level;Performs ADLs on one level   Bathroom Shower/Tub   (pt reports sponge bathing)   [place]   Additional Comments pt reports staying on the second floor  does not climb up/down the steps  was carried in to the house in a w/c when she last came home   usually does virtual visits for MD appointments   Prior Function   Level of [place] Independent with ADLs and functional mobility   Lives With Daughter   Receives Help From Family   ADL Assistance Independent   IADLs Needs assistance   Falls in the last 6 months 1 to 4   Comments pt reports 1 recent fall when attempted to get on her toilet and missed   Lifestyle   Autonomy Evelyne Chapa reports being mostly independent w self care  daugther can assist as needed  daughter is away from home from 8am-2:30pm   Reciprocal Relationships supportive family   Intrinsic Gratification watch TV   Psychosocial   Psychosocial (WDL) WDL   Subjective   Subjective I always use a walker at home   ADL   Eating Assistance 7  [address] 5  Supervision/Setup   LB Bathing Deficit Increased time to complete   UB Dressing Assistance 5  Supervision/Setup   UB Dressing Deficit Increased time to complete   LB Dressing Assistance 3  Moderate Assistance   LB Dressing Deficit Don/doff R sock; Don/doff L sock   Additional Comments able to complete perihygiene self   Bed Mobility   Supine to Sit 4  Minimal assistance   Additional items Assist x 1;HOB elevated   Transfers   Sit to Stand 4  Minimal assistance   Additional items Verbal cues  (followed by S second time)   Stand to Sit 5  Supervision   Additional items Verbal cues   Stand pivot 5  Supervision   Additional items   (use of RW)   Additional Comments use of RW for transfer bed to w/c   Functional Mobility   Functional Mobility 5  Supervision   Additional Comments up to 7 steps in room   Additional items Rolling walker   Balance   Static Sitting Fair   Dynamic Sitting Fair -   Static Standing Fair -   Dynamic Standing Fair -   Activity Tolerance   Activity Tolerance Patient tolerated treatment well   Medical Staff Made Aware OK to see per RN Lucia   RUE Assessment   RUE Assessment WFL   LUE Assessment   LUE Assessment WFL   Hand Function   Gross Motor Coordination Functional   Fine Motor Coordination Functional   Vision - Complex Assessment   Tracking Able to track stimulus in all quads without difficulty   Cognition   Overall Cognitive Status Impaired   Arousal/Participation Alert; Cooperative   Attention Within functional limits   Orientation Level Oriented to person;Oriented to place;Oriented to time;Oriented to situation  (off by a few days in terms of date)   Memory Decreased recall of recent events   Following Commands Follows one step commands without difficulty   Comments pt needs increased time to respond to multi step commands   Assessment   Limitation Decreased ADL status; Decreased self-care trans;Decreased high-level ADLs   Assessment Pt is a 66 y o  female seen for OT evaluation s/p admit to [facility] on 7/10/2022 w/ Sepsis ([address] )  Per EMS, pt was covered in stool and urine  Comorbidities affecting pt's functional performance at time of assessment include: morbid obesity (BMI 44), osteoporosis, afib, psoriasis, HTN, RA  Personal factors affecting pt at time of IE include:steps to enter environment, difficulty performing ADLS and difficulty performing IADLS   Prior to admission, pt was living w daughter and grand daughter in 2   Once pt is at home, she stays on the second floor (needs to be carried up in a w/c)  Pt reports being independent w sponge bathe, toileting etc  Upon evaluation: Pt requires min LB self care, Close S for mobility short distance in room 2* the following deficits impacting occupational performance: decreased balance and decreased tolerance  Pt to benefit from continued skilled OT tx while in the hospital to address deficits as defined above and maximize level of functional independence w ADL's and functional mobility  Occupational Performance areas to address include: bathing/shower, toilet hygiene, dressing, functional mobility and clothing management  Based on findings from OT evaluation and functional performance deficits, pt has been identified as a  high complexity evaluation   The patient's raw score on the AM-PAC Daily Activity inpatient short form is 21, standardized score is 44 27, greater than 39 4  Patients at this level are likely to benefit from discharge to home  From OT standpoint, recommendation at time of d/c would be home w family support and home health services   Goals   Patient Goals to go home   STG Time Frame 3-5   Short Term Goal #1 pt will complete bedmobility mod I w good safety   Short Term Goal #2 good balance sitting at EOB x 10 min for completion of self care   Short Term Goal  tolerate standing x 3 min w fair + balance/support of RW for increased safety w hygiene and transfers   LTG Time Frame 7-10   Long Term Goal #1 assess for DME needs for home   Functional Transfer Goals   Pt Will Perform All Functional Transfers With mod indep; With assistive devices   ADL Goals   Pt Will Perform All ADL's With stand by assist   Plan   Treatment Interventions ADL retraining;Functional transfer training; Endurance training;Patient/family training;Energy conservation; Activityengagement   Goal Expiration Date 07/22/22   OT Frequency 2-3x/wk   Recommendation   OT Discharge Recommendation Home with home health rehabilitation  (see additional note/comment below)   Additional Comments  from OT standpoint, pt is close to her baseline  it is of concern that pt has a full flight of stairs at home (although not negotiating at baseline)  if family is able to continue to assist patient as needed, OT recommends home with family support and home therapy  should the stairs/family support be too difficulty of a situation, will need to look into STR  Additional Comments 2 spoke with daughter, plan is to take patient home w home health services  pt is not interested in going to rehab per pt and daughter     AM-PAC Daily Activity Inpatient   Lower Body Dressing 3   Bathing 3   Toileting 3   Upper Body Dressing 4   Grooming 4   Eating 4   Daily Activity Raw Score 21   Daily Activity Standardized Score (Calc for Raw Score >=11) 44 27

## 2022-07-12 NOTE — PLAN OF CARE
Problem: Prexisting or High Potential for Compromised Skin Integrity  Goal: Skin integrity is maintained or improved  Description: INTERVENTIONS:  - Identify patients at risk for skin breakdown  - Assess and monitor skin integrity  - Assess and monitor nutrition and hydration status  - Monitor labs   - Assess for incontinence   - Turn and reposition patient  - Assist with mobility/ambulation  - Relieve pressure over bony prominences  - Avoid friction and shearing  - Provide appropriate hygiene as needed including keeping skin clean and dry  - Evaluate need for skin moisturizer/barrier cream  - Collaborate with interdisciplinary team   - Patient/family teaching  - Consider wound care consult   Outcome: Progressing     Problem: MOBILITY - ADULT  Goal: Maintain or return to baseline ADL function  Description: INTERVENTIONS:  -  Assess patient's ability to carry out ADLs; assess patient's baseline for ADL function and identify physical deficits which impact ability to perform ADLs (bathing, care of mouth/teeth, toileting, grooming, dressing, etc )  - Assess/evaluate cause of self-care deficits   - Assess range of motion  - Assess patient's mobility; develop plan if impaired  - Assess patient's need for assistive devices and provide as appropriate  - Encourage maximum independence but intervene and supervise when necessary  - Involve family in performance of ADLs  - Assess for home care needs following discharge   - Consider OT consult to assist with ADL evaluation and planning for discharge  - Provide patient education as appropriate  Outcome: Progressing  Goal: Maintains/Returns to pre admission functional level  Description: INTERVENTIONS:  - Perform BMAT or MOVE assessment daily    - Set and communicate daily mobility goal to care team and patient/family/caregiver  - Collaborate with rehabilitation services on mobility goals if consulted  - Perform Range of Motion   times a day    - Reposition patient every hours   - Dangle patient   times a day  - Stand patient   times a day  - Ambulate patient   times a day  - Out of bed to chair   times a day   - Out of bed for meals   times a day  - Out of bed for toileting  - Record patient progress and toleration of activity level   Outcome: Progressing     Problem: Potential for Falls  Goal: Patient will remain free of falls  Description: INTERVENTIONS:  - Educate patient/family on patient safety including physical limitations  - Instruct patient to call for assistance with activity   - Consult OT/PT to assist with strengthening/mobility   - Keep Call bell within reach  - Keep bed low and locked with side rails adjusted as appropriate  - Keep care items and personal belongings within reach  - Initiate and maintain comfort rounds  - Make Fall Risk Sign visible to staff  - Offer Toileting every   Hours, in advance of need  - Initiate/Maintain  alarm  - Obtain necessary fall risk management equipment:    - Apply yellow socks and bracelet for high fall risk patients  - Consider moving patient to room near nurses station  Outcome: Progressing     Problem: PAIN - ADULT  Goal: Verbalizes/displays adequate comfort level or baseline comfort level  Description: Interventions:  - Encourage patient to monitor pain and request assistance  - Assess pain using appropriate pain scale  - Administer analgesics based on type and severity of pain and evaluate response  - Implement non-pharmacological measures as appropriate and evaluate response  - Consider cultural and social influences on pain and pain management  - Notify physician/advanced practitioner if interventions unsuccessful or patient reports new pain  Outcome: Progressing     Problem: INFECTION - ADULT  Goal: Absence or prevention of progression during hospitalization  Description: INTERVENTIONS:  - Assess and monitor for signs and symptoms of infection  - Monitor lab/diagnostic results  - Monitor all insertion sites, i e  indwelling lines, tubes, and drains  - Monitor endotracheal if appropriate and nasal secretions for changes in amount and color  - [place] appropriate cooling/warming therapies per order  - Administer medications as ordered  - Instruct and encourage patient and family to use good hand hygiene technique  - Identify and instruct in appropriate isolation precautions for identified infection/condition  Outcome: Progressing  Goal: Absence of fever/infection during neutropenic period  Description: INTERVENTIONS:  - Monitor WBC    Outcome: Progressing     Problem: SAFETY ADULT  Goal: Maintain or return to baseline ADL function  Description: INTERVENTIONS:  -  Assess patient's ability to carry out ADLs; assess patient's baseline for ADL function and identify physical deficits which impact ability to perform ADLs (bathing, care of mouth/teeth, toileting, grooming, dressing, etc )  - Assess/evaluate cause of self-care deficits   - Assess range of motion  - Assess patient's mobility; develop plan if impaired  - Assess patient's need for assistive devices and provide as appropriate  - Encourage maximum independence but intervene and supervise when necessary  - Involve family in performance of ADLs  - Assess for home care needs following discharge   - Consider OT consult to assist with ADL evaluation and planning for discharge  - Provide patient education as appropriate  Outcome: Progressing  Goal: Maintains/Returns to pre admission functional level  Description: INTERVENTIONS:  - Perform BMAT or MOVE assessment daily    - Set and communicate daily mobility goal to care team and patient/family/caregiver  - Collaborate with rehabilitation services on mobility goals if consulted  - Perform Range of Motion   times a day  - Reposition patient every   hours    - Dangle patient   times a day  - Stand patient   times a day  - Ambulate patient   times a day  - Out of bed to chair   times a day   - Out of bed for meals   times a day  - Out of bed for toileting  - Record patient progress and toleration of activity level   Outcome: Progressing  Goal: Patient will remain free of falls  Description: INTERVENTIONS:  - Educate patient/family on patient safety including physical limitations  - Instruct patient to call for assistance with activity   - Consult OT/PT to assist with strengthening/mobility   - Keep Call bell within reach  - Keep bed low and locked with side rails adjusted as appropriate  - Keep care items and personal belongings within reach  - Initiate and maintain comfort rounds  - Make Fall Risk Sign visible to staff  - Offer Toileting every   Hours, in advance of need  - Initiate/Maintain  alarm  - Obtain necessary fall risk management equipment:      - Apply yellow socks and bracelet for high fall risk patients  - Consider moving patient to room near nurses station  Outcome: Progressing     Problem: DISCHARGE PLANNING  Goal: Discharge to home or other facility with appropriate resources  Description: INTERVENTIONS:  - Identify barriers to discharge w/patient and caregiver  - Arrange for needed discharge resources and transportation as appropriate  - Identify discharge learning needs (meds, wound care, etc )  - Arrange for interpretive services to assist at discharge as needed  - Refer to Case Management Department for coordinating discharge planning if the patient needs post-hospital services based on physician/advanced practitioner order or complex needs related to functional status, cognitive ability, or social support system  Outcome: Progressing     Problem: Knowledge Deficit  Goal: Patient/family/caregiver demonstrates understanding of disease process, treatment plan, medications, and discharge instructions  Description: Complete learning assessment and assess knowledge base    Interventions:  - Provide teaching at level of understanding  - Provide teaching via preferred learning methods  Outcome: Progressing

## 2022-07-12 NOTE — ASSESSMENT & PLAN NOTE
Known to Dr Josh Bautista    · Continue Prednisone 5 mg BID and Plaquenil  · Rheumatology consult ordered on admission, pending

## 2022-07-12 NOTE — PLAN OF CARE
Problem: OCCUPATIONAL THERAPY ADULT  Goal: Performs self-care activities at highest level of function for planned discharge setting  See evaluation for individualized goals  Note: Limitation: Decreased ADL status, Decreased self-care trans, Decreased high-level ADLs     Assessment: Pt is a 66 y o  female seen for OT evaluation s/p admit to [facility] on 7/10/2022 w/ Sepsis ([address] )  Per EMS, pt was covered in stool and urine  Comorbidities affecting pt's functional performance at time of assessment include: morbid obesity (BMI 44), osteoporosis, afib, psoriasis, HTN, RA  Personal factors affecting pt at time of IE include:steps to enter environment, difficulty performing ADLS and difficulty performing IADLS   Prior to admission, pt was living w daughter and grand daughter in 2 SH  Once pt is at home, she stays on the second floor (needs to be carried up in a w/c)  Pt reports being independent w sponge bathe, toileting etc  Upon evaluation: Pt requires min LB self care, Close S for mobility short distance in room 2* the following deficits impacting occupational performance: decreased balance and decreased tolerance  Pt to benefit from continued skilled OT tx while in the hospital to address deficits as defined above and maximize level of functional independence w ADL's and functional mobility  Occupational Performance areas to address include: bathing/shower, toilet hygiene, dressing, functional mobility and clothing management  Based on findings from OT evaluation and functional performance deficits, pt has been identified as a  high complexity evaluation  The patient's raw score on the AM-PAC Daily Activity inpatient short form is 21, standardized score is 44 27, greater than 39 4  Patients at this level are likely to benefit from discharge to home   From OT standpoint, recommendation at time of d/c would be home w family support and home health services     OT Discharge Recommendation: Home with home health rehabilitation (see additional note/comment below)

## 2022-07-13 ENCOUNTER — TELEPHONE (OUTPATIENT)
Dept: DERMATOLOGY | Facility: CLINIC | Age: 72
End: 2022-07-13

## 2022-07-13 NOTE — UTILIZATION REVIEW
Notification of Discharge   This is a Notification of Discharge from our facility [address]  Please be advised that this patient has been discharge from our facility  Below you will find the admission and discharge date and time including the patients disposition  UTILIZATION REVIEW CONTACT:  John Nazario  Utilization   Network Utilization Review Department  Phone: [phone] x carefully listen to the prompts  All voicemails are confidential   Email: [email or web address]     PHYSICIAN ADVISORY SERVICES:  FOR [id number] REVIEW - MEDICAL NECESSITY DENIAL  Phone: [phone]  Fax: [phone]  Email: [email or web address]     PRESENTATION DATE: 7/10/2022  8:07 PM  OBERVATION ADMISSION DATE:   INPATIENT ADMISSION DATE: 7/10/22 10:35 PM   DISCHARGE DATE: 7/12/2022  8:46 PM  DISPOSITION: Home/Self Care Home/Self Care      IMPORTANT INFORMATION:  Send all requests for admission clinical reviews, approved or denied determinations and any other requests to dedicated fax number below belonging to the campus where the patient is receiving treatment   List of dedicated fax numbers:  [address] DENIALS (Administrative/Medical Necessity) [phone]   [address] (Maternity/NICU/Pediatrics) [phone]   Peggy Iyer [phone]   [address] [facility] [phone]   [address] [place] [address] [phone]   2000 [facility] [address] [place] [address],[address] [phone]   [facility]  [phone]   [address] [facility]  [address] [facility] [address] [facility] [phone]

## 2022-07-16 LAB
BACTERIA BLD CULT: NORMAL
BACTERIA BLD CULT: NORMAL

## 2022-07-19 DIAGNOSIS — I10 BENIGN ESSENTIAL HYPERTENSION: ICD-10-CM

## 2022-07-19 RX ORDER — HYDROCHLOROTHIAZIDE 12.5 MG/1
12.5 TABLET ORAL DAILY
Qty: 90 TABLET | Refills: 0 | Status: SHIPPED | OUTPATIENT
Start: 2022-07-19 | End: 2022-10-06 | Stop reason: SDUPTHER

## 2022-07-26 ENCOUNTER — VBI (OUTPATIENT)
Dept: ADMINISTRATIVE | Facility: OTHER | Age: 72
End: 2022-07-26

## 2022-08-09 DIAGNOSIS — I48.91 NEW ONSET A-FIB (HCC): ICD-10-CM

## 2022-08-26 DIAGNOSIS — I48.91 ATRIAL FIBRILLATION, NEW ONSET (HCC): ICD-10-CM

## 2022-08-26 RX ORDER — POTASSIUM CHLORIDE 750 MG/1
10 TABLET, EXTENDED RELEASE ORAL DAILY
Qty: 90 TABLET | Refills: 0 | Status: SHIPPED | OUTPATIENT
Start: 2022-08-26

## 2022-09-06 ENCOUNTER — VBI (OUTPATIENT)
Dept: ADMINISTRATIVE | Facility: OTHER | Age: 72
End: 2022-09-06

## 2022-09-13 DIAGNOSIS — I48.91 NEW ONSET A-FIB (HCC): ICD-10-CM

## 2022-09-13 DIAGNOSIS — I48.0 PAROXYSMAL ATRIAL FIBRILLATION (HCC): ICD-10-CM

## 2022-09-15 ENCOUNTER — TELEMEDICINE (OUTPATIENT)
Dept: INTERNAL MEDICINE CLINIC | Facility: CLINIC | Age: 72
End: 2022-09-15
Payer: COMMERCIAL

## 2022-09-15 VITALS — BODY MASS INDEX: 46.07 KG/M2 | HEIGHT: 63 IN | WEIGHT: 260 LBS

## 2022-09-15 DIAGNOSIS — R91.8 OPACITY OF LUNG ON IMAGING STUDY: ICD-10-CM

## 2022-09-15 DIAGNOSIS — M06.9 RHEUMATOID ARTHRITIS INVOLVING MULTIPLE SITES, UNSPECIFIED WHETHER RHEUMATOID FACTOR PRESENT (HCC): Chronic | ICD-10-CM

## 2022-09-15 DIAGNOSIS — R93.89 ABNORMAL CT OF THE CHEST: Primary | ICD-10-CM

## 2022-09-15 DIAGNOSIS — L40.1 PUSTULAR PSORIASIS: ICD-10-CM

## 2022-09-15 DIAGNOSIS — Z74.8 ASSISTANCE NEEDED WITH TRANSPORTATION: ICD-10-CM

## 2022-09-15 DIAGNOSIS — I48.0 PAROXYSMAL ATRIAL FIBRILLATION (HCC): ICD-10-CM

## 2022-09-15 DIAGNOSIS — I10 BENIGN ESSENTIAL HYPERTENSION: ICD-10-CM

## 2022-09-15 PROBLEM — E66.01 MORBID OBESITY WITH BMI OF 40.0-44.9, ADULT (HCC): Status: RESOLVED | Noted: 2019-07-23 | Resolved: 2022-09-15

## 2022-09-15 PROCEDURE — 1160F RVW MEDS BY RX/DR IN RCRD: CPT | Performed by: INTERNAL MEDICINE

## 2022-09-15 PROCEDURE — 99214 OFFICE O/P EST MOD 30 MIN: CPT | Performed by: INTERNAL MEDICINE

## 2022-09-15 PROCEDURE — 3725F SCREEN DEPRESSION PERFORMED: CPT | Performed by: INTERNAL MEDICINE

## 2022-09-15 NOTE — PROGRESS NOTES
Virtual Regular Visit    Verification of patient location:    Patient is located in the following state in which I hold an active license [place]      Assessment/Plan:    Problem List Items Addressed This Visit        Cardiovascular and Mediastinum    Benign essential hypertension    Relevant Orders    Basic metabolic panel    Paroxysmal atrial fibrillation ([address] )     -she has been asymptomatic  -continue on metoprolol tartrate 25mg q12 for rate control  -tolerating eliquis for stroke ppx            Musculoskeletal and Integument    Rheumatoid arthritis (HCC) (Chronic)     -on plaquenil and prednisone 5mg bid   -followed by Rheum Dr Rodney Filler         Pustular psoriasis     -flared when she ran out of prednisone  It was restarted at 5mg bid  -encouraged to take triamcinolone cream and emollients as previously advised by Jannie Jerry  -needs follow up by Derm            Other    Abnormal CT of the chest - Primary     -incidental finding of RLL 0 8x0 7cm on CT chest  -she is a nonsmoker  -has had cough intermittently though suspect from PND  -has underlying RA  Recommend repeat CT chest in 6mos           Relevant Orders    CT chest wo contrast    Assistance needed with transportation     -pt is homebound, unable to go down the stairs at her home   -has been hospitalized several times over the past year, completed PT without significant progression to allow her to independently use the stairs  -she has not been able to follow up with her specialists         Relevant Orders    Ambulatory Referral to [facility]      Other Visit Diagnoses     Opacity of lung on imaging study        Relevant Orders    CT chest wo contrast               Reason for visit is   Chief Complaint   Patient presents with    Follow-up    Virtual Regular Visit        Encounter provider Montrell Stephenson DO    Provider located at [address] [id number]      Recent Visits  No visits were found meeting these conditions  Showing recent visits within past 7 days and meeting all other requirements  Today's Visits  Date Type Provider Dept   09/15/22 Telemedicine Nya Anaya, [address] [place] [address] Internal [facility]   Showing today's visits and meeting all other requirements  Future Appointments  No visits were found meeting these conditions  Showing future appointments within next 150 days and meeting all other requirements       The patient was identified by name and date of birth  Corby Diaz was informed that this is a telemedicine visit and that the visit is being conducted through [address] [place] [address] Now and patient was informed that this is a secure, HIPAA-compliant platform  She agrees to proceed     My office door was closed  No one else was in the room  She acknowledged consent and understanding of privacy and security of the video platform  The patient has agreed to participate and understands they can discontinue the visit at any time  Patient is aware this is a billable service  Subjective  Corby Diaz is a 74 y o  female with HTN, PAF, lumbar radiculopathy, RA, pustular psoriasis, osteoporosis with vitamin D def and sarcoidosis for follow up care  Her daughter Rosina Kocher is present for the call   HPI     Patient remains homebound, she is unable to get down the steps of her home  She is doing well  Rash is stable but admits she has not been using her triamcinolone  She remains on prednisone 5mg bid  She was tried on humira by her Rheum but it did not improve her rash so it was discontinued  She had incidental finding of RLL nodular opacity 7/2022 when she was hospitalized for pustular psoriasis flare when her prednisone ran out  She denies cough currently, SOB  She denies palpations, CP  She denies bleeding episodes on eliquis      Past Medical History:   Diagnosis Date    Abnormal thyroid function test     last assessed: Sept 25, 2015     Arthritis     Caries last assessed: 2016     Continuous opioid dependence [facility]) 2021    Edema of right lower extremity     last assessed: 2015     GERD (gastroesophageal reflux disease)     Hypertension     Medicare annual wellness visit, subsequent 2021    Positive blood culture 3/11/2022    Sarcoid        Past Surgical History:   Procedure Laterality Date     SECTION      MULTIPLE TOOTH EXTRACTIONS N/A 2016    Procedure: Surgical extraction of teeth 2, 18, 19, 30, 31; incision and drainage of left subperiosteal abscess ;  Surgeon: Risa Castañeda DMD;  Location: [facility];  Service:        Current Outpatient Medications   Medication Sig Dispense Refill    acetaminophen (TYLENOL) 325 mg tablet Take 2 tablets (650 mg total) by mouth every 4 (four) hours as needed for mild pain, headaches or fever  30 tablet 0    alendronate (FOSAMAX) 70 mg tablet Take by mouth every 7 days       apixaban (ELIQUIS) 5 mg Take 1 tablet (5 mg total) by mouth 2 (two) times a day 60 tablet 0    cholecalciferol (VITAMIN D3) 1,000 units tablet Take 1,000 Units by mouth daily      Clobetasol Propionate E 0 05 % emollient cream APPLY TWICE A DAY FOR PALM SKIN DERMITITS      gabapentin (Neurontin) 100 mg capsule Take 1 capsule in AM and 3 capsules in  capsule 1    hydrochlorothiazide (HYDRODIURIL) 12 5 mg tablet Take 1 tablet (12 5 mg total) by mouth daily 90 tablet 0    hydroxychloroquine (PLAQUENIL) 200 mg tablet Take 200 mg by mouth 2 (two) times a day        ketoconazole (NIZORAL) 2 % cream Apply topically 2 (two) times a day 60 g 0    metoprolol tartrate (LOPRESSOR) 25 mg tablet Take 1 tablet (25 mg total) by mouth every 12 (twelve) hours 180 tablet 0    omeprazole (PriLOSEC) 20 mg delayed release capsule TAKE 1 CAPSULE DAILY 90 capsule 3    potassium chloride (K-DUR,KLOR-CON) 10 mEq tablet Take 1 tablet (10 mEq total) by mouth daily 90 tablet 0    predniSONE 5 mg tablet Take 1 tablet (5 mg total) by mouth 2 (two) times a day with meals 60 tablet 0    white petrolatum-mineral oil (EUCERIN,HYDROCERIN) cream Apply topically 3 (three) times a day 454 g 0    triamcinolone (KENALOG) 0 1 % cream Apply 1 application topically 2 (two) times a day for 14 days To affected area 28 g 0     No current facility-administered medications for this visit  Allergies   Allergen Reactions    Methotrexate Derivatives     Amoxicillin Rash    Ampicillin-Sulbactam Sodium Rash    Shellfish-Derived Products - Food Allergy Itching       Review of Systems   Constitutional: Negative for activity change, appetite change and unexpected weight change  HENT: Positive for rhinorrhea  Negative for postnasal drip  Respiratory: Negative for cough, chest tightness, shortness of breath and wheezing  Cardiovascular: Positive for leg swelling  Negative for chest pain and palpitations  Musculoskeletal: Positive for arthralgias and gait problem  Neurological: Negative for dizziness and headaches  Video Exam    Vitals:    09/15/22 1024   Weight: 118 kg (260 lb)   Height: 5' 3" (1 6 m)       Physical Exam  Constitutional:       General: She is not in acute distress  Appearance: She is obese  She is not diaphoretic  Comments: Sits on the bed   HENT:      Head: Normocephalic  Pulmonary:      Effort: Pulmonary effort is normal  No respiratory distress  Comments: No conversational dyspnea  Musculoskeletal:      Right lower leg: No edema  Left lower leg: No edema  Skin:     General: Skin is dry  Findings: Rash present  Comments: Mildly erythematous on torso with areas of hypopigmentation  Neurological:      Mental Status: She is alert and oriented to person, place, and time     Psychiatric:         Mood and Affect: Mood normal         I have spent 40 minutes with Family today in which greater than 50% of this time was spent in counseling/coordination of care regarding Diagnostic results, Risks and benefits of tx options, Intructions for management, Patient and family education, Importance of tx compliance, Risk factor reductions, Impressions and chart review

## 2022-09-15 NOTE — ASSESSMENT & PLAN NOTE
-incidental finding of RLL 0 8x0 7cm on CT chest  -she is a nonsmoker  -has had cough intermittently though suspect from PND  -has underlying RA  Recommend repeat CT chest in 6mos

## 2022-09-15 NOTE — ASSESSMENT & PLAN NOTE
-flared when she ran out of prednisone   It was restarted at 5mg bid  -encouraged to take triamcinolone cream and emollients as previously advised by Juan Miguel Alejandra  -needs follow up by Derm

## 2022-09-15 NOTE — ASSESSMENT & PLAN NOTE
-she has been asymptomatic  -continue on metoprolol tartrate 25mg q12 for rate control  -tolerating eliquis for stroke ppx

## 2022-09-15 NOTE — ASSESSMENT & PLAN NOTE
-pt is homebound, unable to go down the stairs at her home   -has been hospitalized several times over the past year, completed PT without significant progression to allow her to independently use the stairs  -she has not been able to follow up with her specialists

## 2022-09-16 ENCOUNTER — PATIENT OUTREACH (OUTPATIENT)
Dept: INTERNAL MEDICINE CLINIC | Facility: CLINIC | Age: 72
End: 2022-09-16

## 2022-09-16 DIAGNOSIS — E66.01 MORBID OBESITY (HCC): ICD-10-CM

## 2022-09-16 DIAGNOSIS — M06.9 RHEUMATOID ARTHRITIS INVOLVING MULTIPLE SITES, UNSPECIFIED WHETHER RHEUMATOID FACTOR PRESENT (HCC): Primary | ICD-10-CM

## 2022-09-16 DIAGNOSIS — M54.16 LUMBAR RADICULOPATHY: ICD-10-CM

## 2022-09-16 LAB
DME PARACHUTE DELIVERY DATE REQUESTED: NORMAL
DME PARACHUTE ITEM DESCRIPTION: NORMAL
DME PARACHUTE ORDER STATUS: NORMAL
DME PARACHUTE SUPPLIER NAME: NORMAL
DME PARACHUTE SUPPLIER PHONE: NORMAL

## 2022-09-16 NOTE — PROGRESS NOTES
Referral received from PCP with request for OP SWCM to outreach patient and assess needs  Per PCP, patient is unable to attend in-person appts due to inability to ambulate up/down flight of steps in apartment at this time  Patient has participated in PT/OT services in the past but is not currently active with these services; most recent was [facility] in July per chart review  Spoke with patient who confirmed she lives in [address] [place] [address] with her dtr and granddtr; both work during the day  Patient is able to dress and toilet herself  She requires assistance with bathing or sponge-bathes due to fear of falling in the tub  Patient has 2 showers; 1 standing shower and 1 tub shower  Patient does not have shower chair/bench at this time and agreed for PCP to place order to [facility] for shower chair to see if this will work in her standing shower  Request sent to Dr Stephen Ovalle for shower chair to be ordered through [facility]  Patient confirmed she participated in home therapy in the past but did not keep up with exercises once discharged from services  She states she did not continue exercises as she has a fear of falling and also stated her dtr & granddtr are tired after getting home from work to help her with the exercises  Encouraged her to ask her family for help with exercises once they are home, even if just for a short amount of time to keep up with strength/endurance  Patient uses rolling walker to ambulate  Patient's dtr preps meals and patient microwaves during the day  Patient's dtr does grocery shopping as well  Patient has not been able to leave her house since January hospitalization unless taken down steps by ambulance  Patient rents her townhouse; asked if she talked with landlord in the past about getting stairglide installed  Patient states she has not talked with landlord about this  Discussed cost associated as well    She states they are searching for a 1st floor apartment  Encouraged her to ask landlord about possibility of installing a stairglide in the meantime  Discussed Waiver Program for aide services if her family is unable to help with supervision during PT/OT exercises and other ADL needs  Patient will think about this and would like her dtr to talk with OP [facility] about this information as well  She will ask her dtr to call OP [facility] once home from work; OP [facility] provided contact information to patient  Will also mail contact information and Waiver Application Process Steps for patient and her dtr to review  Will follow

## 2022-09-23 ENCOUNTER — PATIENT OUTREACH (OUTPATIENT)
Dept: INTERNAL MEDICINE CLINIC | Facility: CLINIC | Age: 72
End: 2022-09-23

## 2022-09-23 DIAGNOSIS — M54.16 LUMBAR RADICULOPATHY: ICD-10-CM

## 2022-09-23 RX ORDER — GABAPENTIN 100 MG/1
CAPSULE ORAL
Qty: 120 CAPSULE | Refills: 1 | Status: SHIPPED | OUTPATIENT
Start: 2022-09-23

## 2022-09-23 NOTE — PROGRESS NOTES
Call placed to patient to check status, ask if she received information on Waiver services, and see if she received shower chair through [facility]  Patient did not receive Waiver information yet; she will look through the mail  Patient requested OP SWCM contact her dtr to discuss Waiver services as well  Patient received message from [facility] regarding shower chair but did not return call yet  Encouraged patient to return this call to arrange delivery of DME  Provided phone # and patient will call [facility]  Call placed to patient's dtr Candi Cassette [phone] to discuss Waiver services  No answer, voicemail left, and awaiting return call

## 2022-09-30 ENCOUNTER — PATIENT OUTREACH (OUTPATIENT)
Dept: INTERNAL MEDICINE CLINIC | Facility: CLINIC | Age: 72
End: 2022-09-30

## 2022-09-30 NOTE — PROGRESS NOTES
Call placed ot patient to check status and ensure she returned call to [facility] to arrange shower chair delivery  Voicemail previously left for patient's dtr Albert Griffin to discuss Waiver Program; no return call received  No answer from patient; voicemail left and awaiting return call  2nd outreach attempt to patient's dtr Albert Griffin to discuss the Waiver Program   No answer, voicemail left, and awaiting return call

## 2022-10-06 DIAGNOSIS — I48.91 NEW ONSET A-FIB (HCC): ICD-10-CM

## 2022-10-06 DIAGNOSIS — I10 BENIGN ESSENTIAL HYPERTENSION: ICD-10-CM

## 2022-10-06 RX ORDER — HYDROCHLOROTHIAZIDE 12.5 MG/1
12.5 TABLET ORAL DAILY
Qty: 90 TABLET | Refills: 0 | Status: SHIPPED | OUTPATIENT
Start: 2022-10-06

## 2022-10-07 ENCOUNTER — PATIENT OUTREACH (OUTPATIENT)
Dept: INTERNAL MEDICINE CLINIC | Facility: CLINIC | Age: 72
End: 2022-10-07

## 2022-10-07 NOTE — PROGRESS NOTES
2nd outreach attempt to patient to check status and ensure she returned call to [facility] to arrange shower chair delivery  Patient states chair would not fit in shower and order was discontinued  Call placed to Alireza Rosas through [facility] to ask if there is a smaller shower chair option  Voicemail left and awaiting a return call  Informed patient that OP SWCM has not received return call from her dtr in order to discuss aide and home modification process through [facility]   Patient needing help in the home to bathe, needing shower chair, and needing home modification if approved by mane for stair-lift to be installed  Patient will ask her dtr to call OP SWCM today after work  Will await return call from patient's dtr

## 2022-10-07 NOTE — PROGRESS NOTES
Return call received from [facility] through [facility] is not able to offer a smaller shower chair  [facility] suggested having patient search online for smaller option  Will relay to patient's dtr once return call is received

## 2022-10-11 PROBLEM — R05.9 COUGH: Status: RESOLVED | Noted: 2018-03-30 | Resolved: 2022-10-11

## 2022-10-19 ENCOUNTER — PATIENT OUTREACH (OUTPATIENT)
Dept: INTERNAL MEDICINE CLINIC | Facility: CLINIC | Age: 72
End: 2022-10-19

## 2022-10-19 NOTE — PROGRESS NOTES
Attempted to outreach patient to check status, inform that no return call was received from her dtr to discuss Waiver Services, and to inform that [facility] cannot offer a smaller shower chair; [facility] Rep Hubert Springer recommended patient search online for smaller option  No answer from patient and unable to leave voicemail as mailbox is full  Call placed to patient's dtr Karlo Cummings  No answer, voicemail left, and awaiting return call  Will attempt final outreach in roughly 1 week

## 2022-10-26 ENCOUNTER — PATIENT OUTREACH (OUTPATIENT)
Dept: INTERNAL MEDICINE CLINIC | Facility: CLINIC | Age: 72
End: 2022-10-26

## 2022-10-26 NOTE — PROGRESS NOTES
Additional outreach attempt to patient to check status, inform that no return call was received from her dtr to discuss [employer], and to inform that [facility] cannot offer a smaller shower chair; [facility] Rep Sebastián Schwartz recommended patient search online for smaller option  No answer from patient, voicemail left, and awaiting return call      Additional placed to patient's dtr Bennett Vickers  No answer, voicemail left, and awaiting return call  Will close case at this time but will remain available to assist with any future needs pending return call

## 2022-10-27 ENCOUNTER — VBI (OUTPATIENT)
Dept: ADMINISTRATIVE | Facility: OTHER | Age: 72
End: 2022-10-27

## 2022-11-08 DIAGNOSIS — L40.1 PUSTULAR PSORIASIS: ICD-10-CM

## 2022-11-08 DIAGNOSIS — M06.9 RHEUMATOID ARTHRITIS INVOLVING MULTIPLE SITES, UNSPECIFIED WHETHER RHEUMATOID FACTOR PRESENT (HCC): Primary | ICD-10-CM

## 2022-11-09 RX ORDER — PREDNISONE 1 MG/1
5 TABLET ORAL 2 TIMES DAILY WITH MEALS
Qty: 60 TABLET | Refills: 0 | Status: ON HOLD | OUTPATIENT
Start: 2022-11-09

## 2022-11-09 RX ORDER — HYDROXYCHLOROQUINE SULFATE 200 MG/1
200 TABLET, FILM COATED ORAL 2 TIMES DAILY
Qty: 60 TABLET | Refills: 0 | Status: SHIPPED | OUTPATIENT
Start: 2022-11-09 | End: 2022-11-10 | Stop reason: SDUPTHER

## 2022-11-10 DIAGNOSIS — M54.16 LUMBAR RADICULOPATHY: ICD-10-CM

## 2022-11-10 DIAGNOSIS — I10 BENIGN ESSENTIAL HYPERTENSION: ICD-10-CM

## 2022-11-10 DIAGNOSIS — E55.9 VITAMIN D DEFICIENCY: Primary | ICD-10-CM

## 2022-11-10 DIAGNOSIS — M06.9 RHEUMATOID ARTHRITIS INVOLVING MULTIPLE SITES, UNSPECIFIED WHETHER RHEUMATOID FACTOR PRESENT (HCC): ICD-10-CM

## 2022-11-10 DIAGNOSIS — I48.91 ATRIAL FIBRILLATION, NEW ONSET (HCC): ICD-10-CM

## 2022-11-10 DIAGNOSIS — K21.9 GASTROESOPHAGEAL REFLUX DISEASE WITHOUT ESOPHAGITIS: ICD-10-CM

## 2022-11-10 DIAGNOSIS — I48.0 PAROXYSMAL ATRIAL FIBRILLATION (HCC): ICD-10-CM

## 2022-11-10 DIAGNOSIS — I48.91 NEW ONSET A-FIB (HCC): ICD-10-CM

## 2022-11-10 RX ORDER — GABAPENTIN 100 MG/1
CAPSULE ORAL
Qty: 120 CAPSULE | Refills: 1 | Status: ON HOLD | OUTPATIENT
Start: 2022-11-10

## 2022-11-10 RX ORDER — HYDROCHLOROTHIAZIDE 12.5 MG/1
12.5 TABLET ORAL DAILY
Qty: 90 TABLET | Refills: 0 | Status: ON HOLD | OUTPATIENT
Start: 2022-11-10

## 2022-11-10 RX ORDER — HYDROXYCHLOROQUINE SULFATE 200 MG/1
200 TABLET, FILM COATED ORAL 2 TIMES DAILY
Qty: 60 TABLET | Refills: 0 | Status: ON HOLD | OUTPATIENT
Start: 2022-11-10 | End: 2022-12-10

## 2022-11-10 RX ORDER — OMEPRAZOLE 20 MG/1
20 CAPSULE, DELAYED RELEASE ORAL DAILY
Qty: 90 CAPSULE | Refills: 3 | Status: ON HOLD | OUTPATIENT
Start: 2022-11-10

## 2022-11-10 RX ORDER — MELATONIN
1000 DAILY
Qty: 90 TABLET | Refills: 0 | Status: ON HOLD | OUTPATIENT
Start: 2022-11-10

## 2022-11-10 RX ORDER — POTASSIUM CHLORIDE 750 MG/1
10 TABLET, EXTENDED RELEASE ORAL DAILY
Qty: 90 TABLET | Refills: 0 | Status: ON HOLD | OUTPATIENT
Start: 2022-11-10

## 2022-11-16 ENCOUNTER — TELEMEDICINE (OUTPATIENT)
Dept: INTERNAL MEDICINE CLINIC | Facility: CLINIC | Age: 72
End: 2022-11-16

## 2022-11-16 DIAGNOSIS — J20.9 ACUTE BRONCHITIS, UNSPECIFIED ORGANISM: Primary | ICD-10-CM

## 2022-11-16 RX ORDER — ALBUTEROL SULFATE 90 UG/1
2 AEROSOL, METERED RESPIRATORY (INHALATION) EVERY 6 HOURS PRN
Qty: 18 G | Refills: 0 | Status: SHIPPED | OUTPATIENT
Start: 2022-11-16

## 2022-11-16 RX ORDER — COVID-19 ANTIGEN TEST
KIT MISCELLANEOUS
Qty: 2 KIT | Refills: 0 | Status: SHIPPED | OUTPATIENT
Start: 2022-11-16 | End: 2022-11-25

## 2022-11-16 RX ORDER — GUAIFENESIN AND CODEINE PHOSPHATE 100; 10 MG/5ML; MG/5ML
5 SOLUTION ORAL 3 TIMES DAILY PRN
Qty: 180 ML | Refills: 0 | Status: SHIPPED | OUTPATIENT
Start: 2022-11-16

## 2022-11-16 NOTE — PROGRESS NOTES
Virtual Brief Visit    Patient is located in the following state in which I hold an active license [place]      Assessment/Plan:    Problem List Items Addressed This Visit        Respiratory    Acute bronchitis - Primary     -cold symptoms 2-3 weeks ago then anosmia, loss of taste one week ago  -exposure to household members with cold sx  -r/o COVID, home test kit sent to her pharmacy  Call if positive   -pt already on prednisone at baseline for RA  Rx albuterol inh for wheezing  -take guaifenesin with codeine for cough  Side effects discussed  PDMP queried  Relevant Medications    guaifenesin-codeine (GUAIFENESIN AC) 100-10 MG/5ML liquid    albuterol (Ventolin HFA) 90 mcg/act inhaler    COVID-19 At-Home Test KIT     Had cold symptoms 2-3 weeks ago  Reports mild cough, rhinitis, eye drainage  Her granddaughter and daughter were also ill, non tested for COVID  About five days ago she developed anosmia and lost her taste  Her cough has worsened, it is nonproductive  Has continued rhinitis, wheeze and SOB only in the mornings  She denies sore throat, diarrhea, nasal congestion or fever  Has chronic body aches  Recent Visits  No visits were found meeting these conditions  Showing recent visits within past 7 days and meeting all other requirements  Today's Visits  Date Type Provider Dept   11/16/22 Telemedicine Nya Inman, [address] [place] [address]   Showing today's visits and meeting all other requirements  Future Appointments  No visits were found meeting these conditions  Showing future appointments within next 150 days and meeting all other requirements         Visit Time    Visit Start Time: 3650K  Visit Stop Time: 1206p  Total Visit Duration: 20 minutes

## 2022-11-16 NOTE — ASSESSMENT & PLAN NOTE
-cold symptoms 2-3 weeks ago then anosmia, loss of taste one week ago  -exposure to household members with cold sx  -r/o COVID, home test kit sent to her pharmacy  Call if positive   -pt already on prednisone at baseline for RA  Rx albuterol inh for wheezing  -take guaifenesin with codeine for cough  Side effects discussed  PDMP queried

## 2022-11-19 ENCOUNTER — APPOINTMENT (EMERGENCY)
Dept: NON INVASIVE DIAGNOSTICS | Facility: HOSPITAL | Age: 72
End: 2022-11-19

## 2022-11-19 ENCOUNTER — APPOINTMENT (EMERGENCY)
Dept: RADIOLOGY | Facility: HOSPITAL | Age: 72
End: 2022-11-19

## 2022-11-19 ENCOUNTER — HOSPITAL ENCOUNTER (INPATIENT)
Facility: HOSPITAL | Age: 72
LOS: 5 days | Discharge: NURSING, REHAB OR LONG-TERM CARE | End: 2022-11-25
Attending: EMERGENCY MEDICINE | Admitting: STUDENT IN AN ORGANIZED HEALTH CARE EDUCATION/TRAINING PROGRAM

## 2022-11-19 DIAGNOSIS — R65.10 SIRS (SYSTEMIC INFLAMMATORY RESPONSE SYNDROME) (HCC): ICD-10-CM

## 2022-11-19 DIAGNOSIS — R78.81 BLOOD BACTERIAL CULTURE POSITIVE: ICD-10-CM

## 2022-11-19 DIAGNOSIS — E66.01 MORBID OBESITY (HCC): ICD-10-CM

## 2022-11-19 DIAGNOSIS — M79.89 SWELLING OF LOWER EXTREMITY: Primary | ICD-10-CM

## 2022-11-19 DIAGNOSIS — R79.89 ELEVATED BRAIN NATRIURETIC PEPTIDE (BNP) LEVEL: ICD-10-CM

## 2022-11-19 DIAGNOSIS — I50.31 ACUTE DIASTOLIC HEART FAILURE (HCC): ICD-10-CM

## 2022-11-19 DIAGNOSIS — L03.119 LOWER EXTREMITY CELLULITIS: ICD-10-CM

## 2022-11-19 DIAGNOSIS — K59.00 CONSTIPATION: ICD-10-CM

## 2022-11-19 LAB
2HR DELTA HS TROPONIN: -8 NG/L
4HR DELTA HS TROPONIN: -7 NG/L
ALBUMIN SERPL BCP-MCNC: 2.8 G/DL (ref 3.5–5)
ALP SERPL-CCNC: 74 U/L (ref 46–116)
ALT SERPL W P-5'-P-CCNC: 37 U/L (ref 12–78)
ANION GAP SERPL CALCULATED.3IONS-SCNC: 6 MMOL/L (ref 4–13)
AST SERPL W P-5'-P-CCNC: 27 U/L (ref 5–45)
BASOPHILS # BLD AUTO: 0.04 THOUSANDS/ÂΜL (ref 0–0.1)
BASOPHILS NFR BLD AUTO: 0 % (ref 0–1)
BILIRUB SERPL-MCNC: 0.48 MG/DL (ref 0.2–1)
BUN SERPL-MCNC: 22 MG/DL (ref 5–25)
CALCIUM ALBUM COR SERPL-MCNC: 10.7 MG/DL (ref 8.3–10.1)
CALCIUM SERPL-MCNC: 9.7 MG/DL (ref 8.3–10.1)
CARDIAC TROPONIN I PNL SERPL HS: 45 NG/L
CARDIAC TROPONIN I PNL SERPL HS: 46 NG/L
CARDIAC TROPONIN I PNL SERPL HS: 53 NG/L
CHLORIDE SERPL-SCNC: 107 MMOL/L (ref 96–108)
CO2 SERPL-SCNC: 28 MMOL/L (ref 21–32)
CREAT SERPL-MCNC: 1.34 MG/DL (ref 0.6–1.3)
EOSINOPHIL # BLD AUTO: 0.17 THOUSAND/ÂΜL (ref 0–0.61)
EOSINOPHIL NFR BLD AUTO: 2 % (ref 0–6)
ERYTHROCYTE [DISTWIDTH] IN BLOOD BY AUTOMATED COUNT: 14.6 % (ref 11.6–15.1)
FLUAV RNA RESP QL NAA+PROBE: NEGATIVE
FLUBV RNA RESP QL NAA+PROBE: NEGATIVE
GFR SERPL CREATININE-BSD FRML MDRD: 39 ML/MIN/1.73SQ M
GLUCOSE SERPL-MCNC: 93 MG/DL (ref 65–140)
HCT VFR BLD AUTO: 42 % (ref 34.8–46.1)
HGB BLD-MCNC: 12.8 G/DL (ref 11.5–15.4)
IMM GRANULOCYTES # BLD AUTO: 0.04 THOUSAND/UL (ref 0–0.2)
IMM GRANULOCYTES NFR BLD AUTO: 0 % (ref 0–2)
LYMPHOCYTES # BLD AUTO: 0.67 THOUSANDS/ÂΜL (ref 0.6–4.47)
LYMPHOCYTES NFR BLD AUTO: 6 % (ref 14–44)
MCH RBC QN AUTO: 28.1 PG (ref 26.8–34.3)
MCHC RBC AUTO-ENTMCNC: 30.5 G/DL (ref 31.4–37.4)
MCV RBC AUTO: 92 FL (ref 82–98)
MONOCYTES # BLD AUTO: 0.98 THOUSAND/ÂΜL (ref 0.17–1.22)
MONOCYTES NFR BLD AUTO: 9 % (ref 4–12)
NEUTROPHILS # BLD AUTO: 8.82 THOUSANDS/ÂΜL (ref 1.85–7.62)
NEUTS SEG NFR BLD AUTO: 83 % (ref 43–75)
NRBC BLD AUTO-RTO: 0 /100 WBCS
NT-PROBNP SERPL-MCNC: 1717 PG/ML
PLATELET # BLD AUTO: 230 THOUSANDS/UL (ref 149–390)
PMV BLD AUTO: 11.6 FL (ref 8.9–12.7)
POTASSIUM SERPL-SCNC: 4.7 MMOL/L (ref 3.5–5.3)
PROT SERPL-MCNC: 8.2 G/DL (ref 6.4–8.4)
RBC # BLD AUTO: 4.55 MILLION/UL (ref 3.81–5.12)
RSV RNA RESP QL NAA+PROBE: NEGATIVE
SARS-COV-2 RNA RESP QL NAA+PROBE: NEGATIVE
SODIUM SERPL-SCNC: 141 MMOL/L (ref 135–147)
WBC # BLD AUTO: 10.72 THOUSAND/UL (ref 4.31–10.16)

## 2022-11-19 RX ORDER — GABAPENTIN 300 MG/1
300 CAPSULE ORAL
Status: DISCONTINUED | OUTPATIENT
Start: 2022-11-19 | End: 2022-11-25 | Stop reason: HOSPADM

## 2022-11-19 RX ORDER — FUROSEMIDE 10 MG/ML
40 INJECTION INTRAMUSCULAR; INTRAVENOUS ONCE
Status: COMPLETED | OUTPATIENT
Start: 2022-11-19 | End: 2022-11-19

## 2022-11-19 RX ORDER — HYDROXYCHLOROQUINE SULFATE 200 MG/1
200 TABLET, FILM COATED ORAL 2 TIMES DAILY
Status: DISCONTINUED | OUTPATIENT
Start: 2022-11-19 | End: 2022-11-25 | Stop reason: HOSPADM

## 2022-11-19 RX ORDER — PREDNISONE 1 MG/1
5 TABLET ORAL 2 TIMES DAILY WITH MEALS
Status: DISCONTINUED | OUTPATIENT
Start: 2022-11-19 | End: 2022-11-25 | Stop reason: HOSPADM

## 2022-11-19 RX ORDER — HYDROCHLOROTHIAZIDE 12.5 MG/1
12.5 TABLET ORAL DAILY
Status: DISCONTINUED | OUTPATIENT
Start: 2022-11-20 | End: 2022-11-19

## 2022-11-19 RX ORDER — ALBUTEROL SULFATE 90 UG/1
2 AEROSOL, METERED RESPIRATORY (INHALATION) EVERY 6 HOURS PRN
Status: DISCONTINUED | OUTPATIENT
Start: 2022-11-19 | End: 2022-11-25 | Stop reason: HOSPADM

## 2022-11-19 RX ORDER — PANTOPRAZOLE SODIUM 40 MG/1
40 TABLET, DELAYED RELEASE ORAL
Status: DISCONTINUED | OUTPATIENT
Start: 2022-11-20 | End: 2022-11-25 | Stop reason: HOSPADM

## 2022-11-19 RX ADMIN — GABAPENTIN 300 MG: 300 CAPSULE ORAL at 21:15

## 2022-11-19 RX ADMIN — APIXABAN 5 MG: 5 TABLET, FILM COATED ORAL at 18:38

## 2022-11-19 RX ADMIN — PREDNISONE 5 MG: 5 TABLET ORAL at 16:21

## 2022-11-19 RX ADMIN — METOPROLOL TARTRATE 25 MG: 25 TABLET, FILM COATED ORAL at 21:15

## 2022-11-19 RX ADMIN — HYDROXYCHLOROQUINE SULFATE 200 MG: 200 TABLET ORAL at 18:37

## 2022-11-19 RX ADMIN — FUROSEMIDE 40 MG: 10 INJECTION, SOLUTION INTRAMUSCULAR; INTRAVENOUS at 16:16

## 2022-11-19 NOTE — H&P
Melrose [address] 1950, 74 y o  female MRN: [id number]  Unit/Bed#: ED 11 Encounter: [id number]  Primary Care Provider: Charlotte Casas DO   Date and time admitted to hospital: 11/19/2022 11:38 AM    Paroxysmal atrial fibrillation [facility])  Assessment & Plan  History of paroxysmal AFib on Eliquis and rate controlled metoprolol, will continue    Morbid obesity ([address] )  Assessment & Plan  As evidenced by BMI over 45, discussed lifestyle nutritional changes    Benign essential hypertension  Assessment & Plan  History of hypertension continue Lopressor 25 mg b i d  On diuresis will hold hydrochlorothiazide    Sarcoid  Assessment & Plan  History of sarcoid falls Rheumatology continue outpatient meds    GERD (gastroesophageal reflux disease)  Assessment & Plan  Continue Protonix    Rheumatoid arthritis [facility])  Assessment & Plan  Patient follows with Rheumatology will continue on Plaquenil and prednisone 5 mg b i d  * Leg swelling  Assessment & Plan  Bilateral lower extremity edema  Dopplers pending  Exam is fluid overloaded, will trial 40 IV Lasix  Monitor urinary output  Check echocardiogram          VTE Pharmacologic Prophylaxis:   Moderate Risk (Score 3-4) - Pharmacological DVT Prophylaxis Ordered: apixaban (Eliquis)  Code Status: Level 1 - Full Code   Discussion with family: Updated  (daughter) at bedside  Anticipated Length of Stay: Patient will be admitted on an inpatient basis with an anticipated length of stay of greater than 2 midnights secondary to Lower extremity edema  Total Time for Visit, including Counseling / Coordination of Care: 30 minutes Greater than 50% of this total time spent on direct patient counseling and coordination of care      Chief Complaint:  Lower extremity edema, pain    History of Present Illness:  Patrice Chavez is a 74 y o  female with a PMH of rheumatoid arthritis, sarcoid, GERD, hypertension presents for evaluation of bilateral lower extremity pain and swelling  Patient reports she slid off the bed yesterday, she denies any prodromal symptoms include lightheadedness dizziness chest pain shortness of breath  She reports worsening swelling lower extremities over the past week  Is currently pending Doppler studies in the ER during my evaluation  Review of Systems:  Review of Systems   Constitutional: Negative for chills, fatigue and fever  HENT: Negative for ear pain and sore throat  Eyes: Negative for pain and visual disturbance  Respiratory: Negative for cough and shortness of breath  Cardiovascular: Positive for leg swelling  Negative for chest pain and palpitations  Gastrointestinal: Negative for abdominal pain and vomiting  Endocrine: Negative for polydipsia  Genitourinary: Negative for dysuria and hematuria  Musculoskeletal: Negative for arthralgias and back pain  Skin: Negative for color change, pallor, rash and wound  Neurological: Negative for seizures and syncope  All other systems reviewed and are negative        Past Medical and Surgical History:   Past Medical History:   Diagnosis Date   • Abnormal thyroid function test     last assessed: 2015    • Arthritis    • Caries     last assessed: 2016    • Continuous opioid dependence ([facility] [address] ) 2021   • Edema of right lower extremity     last assessed: 2015    • GERD (gastroesophageal reflux disease)    • Hypertension    • Medicare annual wellness visit, subsequent 2021   • Positive blood culture 3/11/2022   • Sarcoid        Past Surgical History:   Procedure Laterality Date   •  SECTION     • MULTIPLE TOOTH EXTRACTIONS N/A 2016    Procedure: Surgical extraction of teeth 2, 18, 19, 30, 31; incision and drainage of left subperiosteal abscess ;  Surgeon: Luh Gomez DMD;  Location: [facility];  Service:        Meds/Allergies:  Prior to Admission medications    Medication Sig Start Date End Date Taking? Authorizing Provider   acetaminophen (TYLENOL) 325 mg tablet Take 2 tablets (650 mg total) by mouth every 4 (four) hours as needed for mild pain, headaches or fever   8/29/16   AZIZA Saunders   albuterol (Ventolin HFA) 90 mcg/act inhaler Inhale 2 puffs every 6 (six) hours as needed for wheezing 11/16/22   Nya Veloz 1485, DO   alendronate (FOSAMAX) 70 mg tablet Take by mouth every 7 days     Historical Provider, MD   apixaban (ELIQUIS) 5 mg Take 1 tablet (5 mg total) by mouth 2 (two) times a day 11/10/22   Madeline Olivares, DO   cholecalciferol (VITAMIN D3) 1,000 units tablet Take 1 tablet (1,000 Units total) by mouth daily 11/10/22   Nya Taveras, DO   Clobetasol Propionate E 0 05 % emollient cream APPLY TWICE A DAY FOR PALM SKIN DERMITITS 9/29/21   Historical Provider, MD   COVID-19 At-Home Test KIT Use as directed 11/16/22   Madeline Olivares, DO   gabapentin (Neurontin) 100 mg capsule Take 1 capsule in AM and 3 capsules in PM 11/10/22   Madeline Olivares, DO   guaifenesin-codeine (GUAIFENESIN AC) 100-10 MG/5ML liquid Take 5 mL by mouth 3 (three) times a day as needed for cough 11/16/22   Nya Veloz 1485, DO   hydrochlorothiazide (HYDRODIURIL) 12 5 mg tablet Take 1 tablet (12 5 mg total) by mouth daily 11/10/22   Madeline Olivares DO   hydroxychloroquine (PLAQUENIL) 200 mg tablet Take 1 tablet (200 mg total) by mouth 2 (two) times a day 11/10/22 12/10/22  Nya Veloz 0135, DO   ketoconazole (NIZORAL) 2 % cream Apply topically 2 (two) times a day 7/23/19   Madeline Olivares, DO   metoprolol tartrate (LOPRESSOR) 25 mg tablet Take 1 tablet (25 mg total) by mouth every 12 (twelve) hours 11/10/22   Nya Taveras, DO   omeprazole (PriLOSEC) 20 mg delayed release capsule Take 1 capsule (20 mg total) by mouth daily 11/10/22   Madeline Olivares DO   potassium chloride (K-DUR,KLOR-CON) 10 mEq tablet Take 1 tablet (10 mEq total) by mouth daily 11/10/22   Sam Florence Roni Murrieta,    predniSONE 5 mg tablet Take 1 tablet (5 mg total) by mouth 2 (two) times a day with meals 11/9/22   Nya Taveras DO   triamcinolone (KENALOG) 0 1 % cream Apply 1 application topically 2 (two) times a day for 14 days To affected area 7/12/22 7/26/22  AZIZA Starks   white petrolatum-mineral oil (EUCERIN,HYDROCERIN) cream Apply topically 3 (three) times a day 3/15/22   Janey Alexander MD     I have reviewed home medications with patient personally  Allergies: Allergies   Allergen Reactions   • Methotrexate Derivatives    • Amoxicillin Rash   • Ampicillin-Sulbactam Sodium Rash   • Shellfish-Derived Products - Food Allergy Itching       Social History:  Marital Status: Single   Occupation:   Patient Pre-hospital Living Situation: Home  Patient Pre-hospital Level of Mobility: unable to be assessed at time of evaluation  Patient Pre-hospital Diet Restrictions:   Substance Use History:   Social History     Substance and Sexual Activity   Alcohol Use No     Social History     Tobacco Use   Smoking Status Never   Smokeless Tobacco Never     Social History     Substance and Sexual Activity   Drug Use No       Family History:  Family History   Problem Relation Age of Onset   • Asthma Mother    • Stroke Mother    • No Known Problems Father    • No Known Problems Sister    • No Known Problems Brother    • No Known Problems Maternal Grandmother    • No Known Problems Maternal Grandfather    • No Known Problems Paternal Grandmother    • No Known Problems Paternal Grandfather    • Diabetes Maternal Aunt    • Breast cancer Cousin    • Diabetes Cousin    • Breast cancer Other        Physical Exam:     Vitals:   Blood Pressure: 154/65 (11/19/22 1146)  Pulse: 94 (11/19/22 1146)  Temperature: 98 8 °F (37 1 °C) (11/19/22 1146)  Temp Source: Oral (11/19/22 1146)  Respirations: 22 (11/19/22 1146)  SpO2: 96 % (11/19/22 1146)    Physical Exam  Vitals and nursing note reviewed     Constitutional: General: She is not in acute distress  Appearance: She is well-developed  She is obese  She is not ill-appearing, toxic-appearing or diaphoretic  HENT:      Head: Normocephalic and atraumatic  Eyes:      General: No scleral icterus  Conjunctiva/sclera: Conjunctivae normal    Cardiovascular:      Rate and Rhythm: Normal rate and regular rhythm  Heart sounds: No murmur heard  No friction rub  No gallop  Pulmonary:      Effort: Pulmonary effort is normal  No respiratory distress  Breath sounds: Normal breath sounds  No stridor  No wheezing, rhonchi or rales  Chest:      Chest wall: No tenderness  Abdominal:      General: There is no distension  Palpations: Abdomen is soft  There is no mass  Tenderness: There is no abdominal tenderness  There is no guarding or rebound  Hernia: No hernia is present  Musculoskeletal:         General: No swelling, tenderness, deformity or signs of injury  Cervical back: Neck supple  Right lower leg: Edema present  Left lower leg: Edema present  Skin:     General: Skin is warm and dry  Capillary Refill: Capillary refill takes less than 2 seconds  Neurological:      Mental Status: She is alert and oriented to person, place, and time     Psychiatric:         Mood and Affect: Mood normal           Additional Data:     Lab Results:  Results from last 7 days   Lab Units 11/19/22  1255   WBC Thousand/uL 10 72*   HEMOGLOBIN g/dL 12 8   HEMATOCRIT % 42 0   PLATELETS Thousands/uL 230   NEUTROS PCT % 83*   LYMPHS PCT % 6*   MONOS PCT % 9   EOS PCT % 2     Results from last 7 days   Lab Units 11/19/22  1255   SODIUM mmol/L 141   POTASSIUM mmol/L 4 7   CHLORIDE mmol/L 107   CO2 mmol/L 28   BUN mg/dL 22   CREATININE mg/dL 1 34*   ANION GAP mmol/L 6   CALCIUM mg/dL 9 7   ALBUMIN g/dL 2 8*   TOTAL BILIRUBIN mg/dL 0 48   ALK PHOS U/L 74   ALT U/L 37   AST U/L 27   GLUCOSE RANDOM mg/dL 93                       Lines/Drains:  Invasive Devices     Peripheral Intravenous Line  Duration           Peripheral IV 11/19/22 Right Antecubital <1 day    Peripheral IV 11/19/22 Right Wrist <1 day                    Imaging: Reviewed radiology reports from this admission including: xray(s)  CT head without contrast   Final Result by Darcy Aguilera MD (11/19 1788)      No acute intracranial abnormality  Chronic microangiopathic changes  Air-fluid level in the right maxillary sinus, which could represent acute sinusitis (series 3 image 2 )            Workstation performed: [id number]         XR knee 4+ views Right injury    (Results Pending)   XR tibia fibula 2 views RIGHT    (Results Pending)   XR chest 1 view portable    (Results Pending)   VAS lower limb venous duplex study, complete bilateral    (Results Pending)       EKG and Other Studies Reviewed on Admission:   · EKG: ekg junctional rhythm  ** Please Note: This note has been constructed using a voice recognition system   **

## 2022-11-19 NOTE — ASSESSMENT & PLAN NOTE
Bilateral lower extremity edema  Dopplers pending  Exam is fluid overloaded, will trial 40 IV Lasix  Monitor urinary output  Check echocardiogram

## 2022-11-19 NOTE — Clinical Note
Case was discussed with  and the patient's admission status was agreed to be Admission Status: observation status to the service of

## 2022-11-20 ENCOUNTER — APPOINTMENT (OUTPATIENT)
Dept: NON INVASIVE DIAGNOSTICS | Facility: HOSPITAL | Age: 72
End: 2022-11-20

## 2022-11-20 LAB
ALBUMIN SERPL BCP-MCNC: 2.2 G/DL (ref 3.5–5)
ALP SERPL-CCNC: 60 U/L (ref 46–116)
ALT SERPL W P-5'-P-CCNC: 28 U/L (ref 12–78)
ANION GAP SERPL CALCULATED.3IONS-SCNC: 5 MMOL/L (ref 4–13)
AST SERPL W P-5'-P-CCNC: 26 U/L (ref 5–45)
ATRIAL RATE: 258 BPM
BACTERIA UR QL AUTO: NORMAL /HPF
BASOPHILS # BLD AUTO: 0.05 THOUSANDS/ÂΜL (ref 0–0.1)
BASOPHILS NFR BLD AUTO: 1 % (ref 0–1)
BILIRUB SERPL-MCNC: 0.45 MG/DL (ref 0.2–1)
BILIRUB UR QL STRIP: NEGATIVE
BUN SERPL-MCNC: 25 MG/DL (ref 5–25)
CALCIUM ALBUM COR SERPL-MCNC: 10.3 MG/DL (ref 8.3–10.1)
CALCIUM SERPL-MCNC: 8.9 MG/DL (ref 8.3–10.1)
CHLORIDE SERPL-SCNC: 110 MMOL/L (ref 96–108)
CLARITY UR: CLEAR
CO2 SERPL-SCNC: 26 MMOL/L (ref 21–32)
COLOR UR: NORMAL
CREAT SERPL-MCNC: 1.28 MG/DL (ref 0.6–1.3)
EOSINOPHIL # BLD AUTO: 0.23 THOUSAND/ÂΜL (ref 0–0.61)
EOSINOPHIL NFR BLD AUTO: 3 % (ref 0–6)
ERYTHROCYTE [DISTWIDTH] IN BLOOD BY AUTOMATED COUNT: 14.6 % (ref 11.6–15.1)
GFR SERPL CREATININE-BSD FRML MDRD: 41 ML/MIN/1.73SQ M
GLUCOSE P FAST SERPL-MCNC: 89 MG/DL (ref 65–99)
GLUCOSE SERPL-MCNC: 89 MG/DL (ref 65–140)
GLUCOSE UR STRIP-MCNC: NEGATIVE MG/DL
HCT VFR BLD AUTO: 36.7 % (ref 34.8–46.1)
HGB BLD-MCNC: 11.2 G/DL (ref 11.5–15.4)
HGB UR QL STRIP.AUTO: NEGATIVE
IMM GRANULOCYTES # BLD AUTO: 0.02 THOUSAND/UL (ref 0–0.2)
IMM GRANULOCYTES NFR BLD AUTO: 0 % (ref 0–2)
KETONES UR STRIP-MCNC: NEGATIVE MG/DL
LEUKOCYTE ESTERASE UR QL STRIP: NEGATIVE
LYMPHOCYTES # BLD AUTO: 0.9 THOUSANDS/ÂΜL (ref 0.6–4.47)
LYMPHOCYTES NFR BLD AUTO: 13 % (ref 14–44)
MCH RBC QN AUTO: 28.1 PG (ref 26.8–34.3)
MCHC RBC AUTO-ENTMCNC: 30.5 G/DL (ref 31.4–37.4)
MCV RBC AUTO: 92 FL (ref 82–98)
MONOCYTES # BLD AUTO: 0.72 THOUSAND/ÂΜL (ref 0.17–1.22)
MONOCYTES NFR BLD AUTO: 11 % (ref 4–12)
NEUTROPHILS # BLD AUTO: 4.92 THOUSANDS/ÂΜL (ref 1.85–7.62)
NEUTS SEG NFR BLD AUTO: 72 % (ref 43–75)
NITRITE UR QL STRIP: NEGATIVE
NON-SQ EPI CELLS URNS QL MICRO: NORMAL /HPF
NRBC BLD AUTO-RTO: 0 /100 WBCS
PH UR STRIP.AUTO: 6.5 [PH]
PLATELET # BLD AUTO: 195 THOUSANDS/UL (ref 149–390)
PMV BLD AUTO: 11.4 FL (ref 8.9–12.7)
POTASSIUM SERPL-SCNC: 4.3 MMOL/L (ref 3.5–5.3)
PROT SERPL-MCNC: 6.7 G/DL (ref 6.4–8.4)
PROT UR STRIP-MCNC: NEGATIVE MG/DL
QRS AXIS: 29 DEGREES
QRSD INTERVAL: 88 MS
QT INTERVAL: 352 MS
QTC INTERVAL: 418 MS
RBC # BLD AUTO: 3.98 MILLION/UL (ref 3.81–5.12)
RBC #/AREA URNS AUTO: NORMAL /HPF
SODIUM SERPL-SCNC: 141 MMOL/L (ref 135–147)
SP GR UR STRIP.AUTO: 1.01 (ref 1–1.03)
T WAVE AXIS: 42 DEGREES
UROBILINOGEN UR STRIP-ACNC: <2 MG/DL
VENTRICULAR RATE: 85 BPM
WBC # BLD AUTO: 6.84 THOUSAND/UL (ref 4.31–10.16)
WBC #/AREA URNS AUTO: NORMAL /HPF

## 2022-11-20 RX ORDER — FUROSEMIDE 10 MG/ML
40 INJECTION INTRAMUSCULAR; INTRAVENOUS ONCE
Status: COMPLETED | OUTPATIENT
Start: 2022-11-20 | End: 2022-11-20

## 2022-11-20 RX ADMIN — METOPROLOL TARTRATE 25 MG: 25 TABLET, FILM COATED ORAL at 21:41

## 2022-11-20 RX ADMIN — APIXABAN 5 MG: 5 TABLET, FILM COATED ORAL at 17:02

## 2022-11-20 RX ADMIN — METOPROLOL TARTRATE 25 MG: 25 TABLET, FILM COATED ORAL at 09:41

## 2022-11-20 RX ADMIN — PREDNISONE 5 MG: 5 TABLET ORAL at 08:00

## 2022-11-20 RX ADMIN — PANTOPRAZOLE SODIUM 40 MG: 40 TABLET, DELAYED RELEASE ORAL at 06:11

## 2022-11-20 RX ADMIN — FUROSEMIDE 40 MG: 10 INJECTION, SOLUTION INTRAMUSCULAR; INTRAVENOUS at 13:00

## 2022-11-20 RX ADMIN — APIXABAN 5 MG: 5 TABLET, FILM COATED ORAL at 09:41

## 2022-11-20 RX ADMIN — PREDNISONE 5 MG: 5 TABLET ORAL at 17:02

## 2022-11-20 RX ADMIN — HYDROXYCHLOROQUINE SULFATE 200 MG: 200 TABLET ORAL at 17:02

## 2022-11-20 RX ADMIN — PERFLUTREN 0.6 ML/MIN: 6.52 INJECTION, SUSPENSION INTRAVENOUS at 17:41

## 2022-11-20 RX ADMIN — GABAPENTIN 300 MG: 300 CAPSULE ORAL at 21:41

## 2022-11-20 NOTE — UTILIZATION REVIEW
Initial Clinical Review    Admission: Date/Time/Statement:   Admission Orders (From admission, onward)     Ordered        11/19/22 1519  Place in Observation  Once                      11/20/22 1250  Inpatient Admission  Once        Transfer Service: Hospitalist       Question Answer Comment   Level of Care Med Surg    Estimated length of stay More than 2 Midnights    Certification I certify that inpatient services are medically necessary for this patient for a duration of greater than two midnights  See H&P and MD Progress Notes for additional information about the patient's course of treatment  11/20/22 1249   OBSERVATION  11/19 @  1519 CHANGED TO  IP ADMISSION 11/20 @  1250  The patient will continue to require additional inpatient hospital stay due to IV   ED Arrival Information     Expected   11/19/2022     Arrival   11/19/2022 11:38    Acuity   Urgent            Means of arrival   Ambulance    Escorted by   [facility]    Service   Hospitalist    Admission type   Emergency            Arrival complaint   Leg Swelling            Chief Complaint   Patient presents with   • Leg Swelling     Pt reports increasing difficulty getting up stairs in apartment complex  Pt states she feels like it is difficult to get around  Having increasing bilateral leg swelling  Initial Presentation: 74 y o  female presents to ED via  EMS    from home with bilateral LE pain and swelling for the past week     Patient reports she slid off the bed yesterday, she denies any prodromal symptoms include lightheadedness dizziness chest pain shortness of breath  PMH is   RA, sarcoid, GERD and HTN  Admit  Observation with  Leg swelling and plan is  2 DE, Doppler  LE,  IV lasix and continue home  Meds  11/20     IP ADMISSION   Good response to IV lasix and re dosing today  Continue  PT/OT  Doppler negative for DVT's  Wait  2  DE  Legs still swollen but improved        11/21     Cardiology consult   Acute Diastolic heart failure  Continue to diurese,  Starting IV lasix  BID  Continue fluid restrict, daily weight  Monitor I & O  Lethargic/arousable  In NSR  Echo with G2DD  Needs  PT/OT  Likely needs rehab at discharge  ED Triage Vitals   Temperature Pulse Respirations Blood Pressure SpO2   11/19/22 1146 11/19/22 1146 11/19/22 1146 11/19/22 1146 11/19/22 1146   98 8 °F (37 1 °C) 94 22 154/65 96 %      Temp Source Heart Rate Source Patient Position - Orthostatic VS BP Location FiO2 (%)   11/19/22 1146 11/19/22 1146 11/19/22 1614 11/19/22 1614 --   Oral Monitor Lying Left arm       Pain Score       11/19/22 2117       No Pain          Wt Readings from Last 1 Encounters:   11/20/22 129 kg (284 lb 6 4 oz)     Additional Vital Signs:   99 °F (37 2 °C) 91 20 138/59 85 99 % None (Room air) Lying    11/19/22 23:27:20 -- 91 -- 139/78 98 100 % -- --   11/19/22 2117 -- -- -- -- -- -- None (Room air) --   11/19/22 20:08:31 98 8 °F (37 1 °C) 93 -- 134/55 81 96 % -- --   11/19/22 1614 -- 89 20 142/100 117 100 % None (Room air) Lying   11/19/22 1146 98 8 °F (37 1 °C) 94 22 154/65 94 96 % None (Room air)        Pertinent Labs/Diagnostic Test Results:   CT head without contrast   Final Result by Flex Doyle MD (11/19 1455)      No acute intracranial abnormality  Chronic microangiopathic changes  Air-fluid level in the right maxillary sinus, which could represent acute sinusitis (series 3 image 2 )            Workstation performed: [id number]         VAS lower limb venous duplex study, complete bilateral   Final Result by Emily Singh MD (11/19 2131)      XR knee 4+ views Right injury   Final Result by Corey Peralta MD (11/19 2350)      Osteoarthritis with no acute osseous abnormality  Workstation performed: [id number]         XR tibia fibula 2 views RIGHT   Final Result by Corey Peralta MD (11/19 2348)      No acute osseous abnormality              Workstation performed: [id number]         XR chest 1 view portable   Final Result by Xavier Franco MD (11/19 2352)      Minimal CHF                    Workstation performed: [id number]           Results from last 7 days   Lab Units 11/19/22  1614   SARS-COV-2  Negative     Results from last 7 days   Lab Units 11/20/22  0606 11/19/22  1255   WBC Thousand/uL 6 84 10 72*   HEMOGLOBIN g/dL 11 2* 12 8   HEMATOCRIT % 36 7 42 0   PLATELETS Thousands/uL 195 230   NEUTROS ABS Thousands/µL 4 92 8 82*         Results from last 7 days   Lab Units 11/20/22  0606 11/19/22  1255   SODIUM mmol/L 141 141   POTASSIUM mmol/L 4 3 4 7   CHLORIDE mmol/L 110* 107   CO2 mmol/L 26 28   ANION GAP mmol/L 5 6   BUN mg/dL 25 22   CREATININE mg/dL 1 28 1 34*   EGFR ml/min/1 73sq m 41 39   CALCIUM mg/dL 8 9 9 7     Results from last 7 days   Lab Units 11/20/22  0606 11/19/22  1255   AST U/L 26 27   ALT U/L 28 37   ALK PHOS U/L 60 74   TOTAL PROTEIN g/dL 6 7 8 2   ALBUMIN g/dL 2 2* 2 8*   TOTAL BILIRUBIN mg/dL 0 45 0 48         Results from last 7 days   Lab Units 11/20/22  0606 11/19/22  1255   GLUCOSE RANDOM mg/dL 89 93               Results from last 7 days   Lab Units 11/19/22  1841 11/19/22  1503 11/19/22  1255   HS TNI 0HR ng/L  --   --  53*   HS TNI 2HR ng/L  --  45  --    HSTNI D2 ng/L  --  -8  --    HS TNI 4HR ng/L 46  --   --    HSTNI D4 ng/L -7  --   --                Results from last 7 days   Lab Units 11/19/22  1255   NT-PRO BNP pg/mL 1,717*               Results from last 7 days   Lab Units 11/19/22  1614   INFLUENZA A PCR  Negative   INFLUENZA B PCR  Negative   RSV PCR  Negative             ED Treatment:   Medication Administration from 11/19/2022 1138 to 11/19/2022 1938       Date/Time Order Dose Route Action Comments     11/19/2022 1838 EST apixaban (ELIQUIS) tablet 5 mg 5 mg Oral Given --     11/19/2022 1837 EST hydroxychloroquine (PLAQUENIL) tablet 200 mg 200 mg Oral Given --     11/19/2022 1621 EST predniSONE tablet 5 mg 5 mg Oral Given -- 11/19/2022 1616 EST furosemide (LASIX) injection 40 mg 40 mg Intravenous Given --        Present on Admission:  • Rheumatoid arthritis ([address] )  • GERD (gastroesophageal reflux disease)  • Sarcoid  • Benign essential hypertension  • Morbid obesity (HCC)  • Paroxysmal atrial fibrillation (HCC)      Admitting Diagnosis: Leg swelling [M79 89]  Swelling of lower extremity [M79 89]  Age/Sex: 74 y o  female  Admission Orders:  Scheduled Medications:  apixaban, 5 mg, Oral, BID  furosemide, 40 mg, Intravenous, Once  gabapentin, 300 mg, Oral, HS  hydroxychloroquine, 200 mg, Oral, BID  metoprolol tartrate, 25 mg, Oral, Q12H FAMILIA  pantoprazole, 40 mg, Oral, Early Morning  predniSONE, 5 mg, Oral, BID With Meals  IV  Lasix  BID       Continuous IV Infusions:     PRN Meds:  albuterol, 2 puff, Inhalation, Q6H PRN        IP CONSULT TO CASE MANAGEMENT    Network Utilization Review Department  ATTENTION: Please call with any questions or concerns to [phone] and carefully listen to the prompts so that you are directed to the right person  All voicemails are confidential   Jeanine Barron all requests for admission clinical reviews, approved or denied determinations and any other requests to dedicated fax number below belonging to the campus where the patient is receiving treatment   List of dedicated fax numbers for the Facilities:  [address] DENIALS (Administrative/Medical Necessity) [phone]   [address] (Maternity/NICU/Pediatrics) [phone]   [address] Mariel Jansen [phone]   [facility] [phone]   [facility] [phone]   [facility] [place] [address] Medical [place] [facility] [phone]   [address] [phone]

## 2022-11-20 NOTE — ASSESSMENT & PLAN NOTE
· Bilateral lower extremity edema, BNP elevated  · Dopplers negative for DVTs  · Check echocardiogram  · Check UA for protein  · PT eval   · Good response to lasix 40mg IV yest, will re-dose today

## 2022-11-20 NOTE — PLAN OF CARE
Problem: PHYSICAL THERAPY ADULT  Goal: Performs mobility at highest level of function for planned discharge setting  See evaluation for individualized goals  Description: Treatment/Interventions: ADL retraining, Functional transfer training, LE strengthening/ROM, Elevations, Therapeutic exercise, Endurance training, Patient/family training, Equipment eval/education, Bed mobility, Compensatory technique education, Gait training, Continued evaluation, Spoke to nursing  Equipment Recommended: Malachi Carter (pt owns)       See flowsheet documentation for full assessment, interventions and recommendations  Note: Prognosis: Fair  Problem List: Decreased strength, Decreased range of motion, Decreased endurance, Impaired balance, Decreased mobility, Decreased safety awareness, Obesity, Pain  Assessment: Pt seen for high complexity PT evaluation  Pt with active PT eval/treat orders  Pt is a 74 y o  female who was admitted to [facility] observation status on 11/19/2022  Pt's current dx/ problem list include: leg swelling  Comorbidities affecting pt's physical performance at time of assessment are as follows:   has a past medical history of paroxysmal atrial fibrillation, morbid obesity, HTN, sarcoid, GERD, rheumatoid arthritis  Personal factors affecting pt at time of initial examination include: steps to enter environment, limited home support, past experience, inability to perform IADLs, inability to perform ADLs, inability to ambulate household distances  Due to acute medical issues, ongoing medical workup for primary dx; pain, fall risk, decreased activity tolerance compared to baseline, increased assistance needed from caregiver at current time, multiple lines, decline in overall functional mobility status; health management issues; note unstable clinical picture (high complexity)  At baseline, pt resides with family in a 2  with and was independent with ADLs/ and required assistance IADLs   Currently, upon initial examination, pt  is requiring mod Ax1 for supine to sit and is unwilling to attempt sit to stand transfer secondary to pain  PT to continue to follow and assess functional transfers and ambulation as appropriate and able  Currently, pt presents functioning below baseline and with overall mobility deficits 2* to: decreased LE strength/AROM; limited flexibility;  generalized weakness/ deconditioning; decreased endurance; decreased activity tolerance; impaired balance  Pt currently at increased risk for falls  At the end of evaluation, pt was left supine with all needs in reach  Pt would benefit from continued skilled PT services while in hospital to address remaining limitations and maximize functional potential  The patient's AM-PAC Basic Mobility Inpatient Short Form Raw Score is 9  A Raw score of less than or equal to 16 suggests the patient may benefit from discharge to post-acute rehabilitation services  Please also refer to the recommendation of the Physical Therapist for safe discharge planning  Barriers to Discharge: Inaccessible home environment     PT Discharge Recommendation: Post acute rehabilitation services    See flowsheet documentation for full assessment

## 2022-11-20 NOTE — PHYSICAL THERAPY NOTE
Physical Therapy Evaluation     Patient's Name: Ravin Brown    Admitting Diagnosis  Leg swelling [M79 89]  Swelling of lower extremity [M79 89]    Problem List  Patient Active Problem List   Diagnosis    Rheumatoid arthritis ([facility] )    GERD (gastroesophageal reflux disease)    Sarcoid    Benign essential hypertension    Morbid obesity ([facility] )    Lumbar radiculopathy    Osteoporosis    Vitamin D deficiency    Erythroderma    BPPV (benign paroxysmal positional vertigo)    Seasonal allergic rhinitis due to pollen    Hyperparathyroidism ([facility] )    Murmur, cardiac    Paroxysmal atrial fibrillation (HCC)    COVID-19    Elevated troponin    Pustular psoriasis    Abnormal CT of the chest    Assistance needed with transportation    Acute bronchitis    Leg swelling       Past Medical History  Past Medical History:   Diagnosis Date    Abnormal thyroid function test     last assessed: 2015     Arthritis     Caries     last assessed: 2016     Contin[facility] 2021    Edema of right lower extremity     last assessed: 2015     GERD (gastroesophageal reflux disease)     Hypertension     Medicare annual wellness visit, subsequent 2021    Positive blood culture 3/11/2022    Sarcoid        Past Surgical History  Past Surgical History:   Procedure Laterality Date     SECTION      MULTIPLE TOOTH EXTRACTIONS N/A 2016    Procedure: Surgical extraction of teeth 2, 18, 19, 30, 31; incision and drainage of left subperiosteal abscess ;  Surgeon: Olinda Kearney DMD;  Location: [facility];  Service:           22 0832   PT Last Visit   PT Visit Date 22   Note Type   Note type Evaluation   Pain Assessment   Pain Assessment Tool 0-10   Pain Score 8   Pain Location/Orientation Orientation: Right;Location: Knee; Location: Leg   Hospital Pain Intervention(s) Repositioned; Ambulation/increased activity; Emotional support   Restrictions/Precautions   Other Precautions Cognitive; Bed Alarm;Multiple lines; Fall Risk;Pain   Home Living   Type of [address] Two level;Bed/bath upstairs  (pt remains on second floor after she is caried up)   Bathroom Shower/Tub   (pt sponge bathes)   Home Equipment Walker;Cane   Additional Comments PTA pt reports ambulation with RW in the house independently  Prior Function   Level of [place] Independent with ADLs; Independent with functional mobility; Needs assistance with IADLS   Lives With Daughter  (grand daughter)   Brianne Lima Help From Family   IADLs Family/Friend/Other provides meals; Family/Friend/Other provides transportation   Falls in the last 6 months 1 to 4  (pt denies however at least 1 as pt slide to floor leading to this admission)   General   Family/Caregiver Present No   Cognition   Arousal/Participation Cooperative   Orientation Level Oriented X4   Following Commands Follows one step commands without difficulty   RLE Assessment   RLE Assessment X  (Grossly 3-/5 MMT, pt refusing to WB)   LLE Assessment   LLE Assessment X  (Grossly 3-/5 MMT, pt refusing to WB)   Bed Mobility   Supine to Sit 3  Moderate assistance   Additional items Assist x 1; Increased time required;Verbal cues;LE management   Sit to Supine 3  Moderate assistance   Additional items Assist x 1; Increased time required;LE management   Additional Comments Pt able to sit EOB w/o UE support   Transfers   Additional Comments Pt refussing to attempt to stand at this time secondary to RLE pain   Ambulation/Elevation   Ambulation/Elevation Additional Comments unable to acess   Balance   Static Sitting Fair   Dynamic Sitting Fair -   Endurance Deficit   Endurance Deficit Yes   Activity Tolerance   Activity Tolerance Patient limited by pain   Nurse Made Aware RN cleared pt for PT evaluation   Assessment   Prognosis Fair   Problem List Decreased strength;Decreased range of motion;Decreased endurance; Impaired balance;Decreased mobility; Decreased safety awareness; Obesity;Pain   Assessment Pt seen for high complexity PT evaluation  Pt with active PT eval/treat orders  Pt is a 74 y o  female who was [facility] on 11/19/2022  Pt's current dx/ problem list include: leg swelling  Comorbidities affecting pt's physical performance at time of assessment are as follows:   has a past medical history of paroxysmal atrial fibrillation, morbid obesity, HTN, sarcoid, GERD, rheumatoid arthritis  Personal factors affecting pt at time of initial examination include: steps to enter environment, limited home support, past experience, inability to perform IADLs, inability to perform ADLs, inability to ambulate household distances  Due to acute medical issues, ongoing medical workup for primary dx; pain, fall risk, decreased activity tolerance compared to baseline, increased assistance needed from caregiver at current time, multiple lines, decline in overall functional mobility status; health management issues; note unstable clinical picture (high complexity)  At baseline, pt resides with family in a 2  with and was independent with ADLs/ and required assistance IADLs  Currently, upon initial examination, pt  is requiring mod Ax1 for supine to sit and is unwilling to attempt sit to stand transfer secondary to pain  PT to continue to follow and assess functional transfers and ambulation as appropriate and able  Currently, pt presents functioning below baseline and with overall mobility deficits 2* to: decreased LE strength/AROM; limited flexibility;  generalized weakness/ deconditioning; decreased endurance; decreased activity tolerance; impaired balance  Pt currently at increased risk for falls  At the end of evaluation, pt was left supine with all needs in reach   Pt would benefit from continued skilled PT services while in hospital to address remaining limitations and maximize functional potential  The patient's AM-PAC Basic Mobility Inpatient Short Form Raw Score is 9  A Raw score of less than or equal to 16 suggests the patient may benefit from discharge to post-acute rehabilitation services  Please also refer to the recommendation of the Physical Therapist for safe discharge planning  Barriers to Discharge Inaccessible home environment   Goals   Patient Goals To sit up   STG Expiration Date 12/04/22   Short Term Goal #1 STG 1  Pt will be able to perform bed mobility with mod I in order to improve overall functional mobility and assist in safe d/c  STG 2  Pt will be able to perform functional transfer with mod I in order to improve overall functional mobility and assist in safe d/c  STG 3  Pt will be able to ambulate at least 30 feet with least restrictive device with mod I A in order to improve overall functional mobility and assist in safe d/c  STG 4  Pt will improve sitting/standing static/dynamic balance 1 grade in order to improve functional mobility and assist in safe d/c  STG 5  Pt will improve LE strength by one grade in order to improve functional mobility and assist in safe d/c  STG 6  Pt will negotiate at least 1 step at mod I level A in order to improve overall funcitonal mobility and assist in safe d/c   Plan   Treatment/Interventions ADL retraining;Functional transfer training;LE strengthening/ROM; Elevations; Therapeutic exercise; Endurance training;Patient/family training;Equipment eval/education; Bed mobility; Compensatory technique education;Gait training;Continued evaluation;Spoke to nursing   PT Frequency 2-3x/wk   Recommendation   PT Discharge Recommendation Post acute rehabilitation services   Equipment Recommended Walker  (pt owns)   [address] in Bed Without Bedrails 3   Lying on Back to Sitting on Edge of Flat Bed 2   Moving Bed to Chair 1   Standing Up From Chair 1   Walk in Room 1   Climb 3-5 Stairs 1   Basic Mobility Inpatient Raw Score 9   Highest Level Of Mobility   [facility] Goal 3: Sit at edge of bed   ALANIS-HLALKA Achieved 3: Sit at edge of bed         Jazlyn Clemons PT

## 2022-11-20 NOTE — ASSESSMENT & PLAN NOTE
History of hypertension continue Lopressor 25 mg b i d    On diuresis will hold hydrochlorothiazide No

## 2022-11-20 NOTE — ED PROVIDER NOTES
History  Chief Complaint   Patient presents with   • Leg Swelling     Pt reports increasing difficulty getting up stairs in apartment complex  Pt states she feels like it is difficult to get around  Having increasing bilateral leg swelling  Patient is a 66-year-old female, past medical history of arthritis, RA, GERD, and hypertension, who presents to the emergency department for worsening bilateral lower extremity swelling  Patient states the swelling started about a week ago  It has progressively worsened since onset  She states it is making it difficult to get around  She states yesterday that she accidentally slid out of her chair onto her bottom  There are no modifying factors for the swelling  There are no associated symptoms  She denies any chest pain, shortness of breath, nausea vomiting, diarrhea, abdominal pain, back pain, or any other new or concerning symptoms  She states she did not strike her head during the fall did not lose consciousness  She denies any prior history of swelling like this in past  She denies any prior hx of CHF  Prior to Admission Medications   Prescriptions Last Dose Informant Patient Reported? Taking? COVID-19 At-Home Test KIT   No No   Sig: Use as directed   Clobetasol Propionate E 0 05 % emollient cream   Yes No   Sig: APPLY TWICE A DAY FOR PALM SKIN DERMITITS   acetaminophen (TYLENOL) 325 mg tablet   No No   Sig: Take 2 tablets (650 mg total) by mouth every 4 (four) hours as needed for mild pain, headaches or fever     albuterol (Ventolin HFA) 90 mcg/act inhaler   No No   Sig: Inhale 2 puffs every 6 (six) hours as needed for wheezing   alendronate (FOSAMAX) 70 mg tablet   Yes No   Sig: Take by mouth every 7 days    apixaban (ELIQUIS) 5 mg   No No   Sig: Take 1 tablet (5 mg total) by mouth 2 (two) times a day   cholecalciferol (VITAMIN D3) 1,000 units tablet   No No   Sig: Take 1 tablet (1,000 Units total) by mouth daily   gabapentin (Neurontin) 100 mg capsule   No No   Sig: Take 1 capsule in AM and 3 capsules in PM   guaifenesin-codeine (GUAIFENESIN AC) 100-10 MG/5ML liquid   No No   Sig: Take 5 mL by mouth 3 (three) times a day as needed for cough   hydrochlorothiazide (HYDRODIURIL) 12 5 mg tablet   No No   Sig: Take 1 tablet (12 5 mg total) by mouth daily   hydroxychloroquine (PLAQUENIL) 200 mg tablet   No No   Sig: Take 1 tablet (200 mg total) by mouth 2 (two) times a day   ketoconazole (NIZORAL) 2 % cream   No No   Sig: Apply topically 2 (two) times a day   metoprolol tartrate (LOPRESSOR) 25 mg tablet   No No   Sig: Take 1 tablet (25 mg total) by mouth every 12 (twelve) hours   omeprazole (PriLOSEC) 20 mg delayed release capsule   No No   Sig: Take 1 capsule (20 mg total) by mouth daily   potassium chloride (K-DUR,KLOR-CON) 10 mEq tablet   No No   Sig: Take 1 tablet (10 mEq total) by mouth daily   predniSONE 5 mg tablet   No No   Sig: Take 1 tablet (5 mg total) by mouth 2 (two) times a day with meals   triamcinolone (KENALOG) 0 1 % cream   No No   Sig: Apply 1 application topically 2 (two) times a day for 14 days To affected area   white petrolatum-mineral oil (EUCERIN,HYDROCERIN) cream   No No   Sig: Apply topically 3 (three) times a day      Facility-Administered Medications: None       Past Medical History:   Diagnosis Date   • Abnormal thyroid function test     last assessed: 2015    • Arthritis    • Caries     last assessed: 2016    • Continuous opioid dependence ([facility] [address] ) 2021   • Edema of right lower extremity     last assessed: 2015    • GERD (gastroesophageal reflux disease)    • Hypertension    • Medicare annual wellness visit, subsequent 2021   • Positive blood culture 3/11/2022   • Sarcoid        Past Surgical History:   Procedure Laterality Date   •  SECTION     • MULTIPLE TOOTH EXTRACTIONS N/A 2016    Procedure: Surgical extraction of teeth 2, 18, 19, 30, 31; incision and drainage of left subperiosteal abscess ;  Surgeon: German Pena DMD;  Location: [facility];  Service:        Family History   Problem Relation Age of Onset   • Asthma Mother    • Stroke Mother    • No Known Problems Father    • No Known Problems Sister    • No Known Problems Brother    • No Known Problems Maternal Grandmother    • No Known Problems Maternal Grandfather    • No Known Problems Paternal Grandmother    • No Known Problems Paternal Grandfather    • Diabetes Maternal Aunt    • Breast cancer Cousin    • Diabetes Cousin    • Breast cancer Other      I have reviewed and agree with the history as documented  E-Cigarette/Vaping   • E-Cigarette Use Never User      E-Cigarette/Vaping Substances   • Nicotine No    • THC No    • CBD No    • Flavoring No    • Other No    • Unknown No      Social History     Tobacco Use   • Smoking status: Never   • Smokeless tobacco: Never   Vaping Use   • Vaping Use: Never used   Substance Use Topics   • Alcohol use: No   • Drug use: No        Review of Systems   Constitutional: Negative for chills and fever  Respiratory: Negative for cough and shortness of breath  Cardiovascular: Positive for leg swelling  Negative for chest pain  Gastrointestinal: Negative for abdominal pain, diarrhea, nausea and vomiting  Musculoskeletal: Positive for gait problem  All other systems reviewed and are negative        Physical Exam  ED Triage Vitals   Temperature Pulse Respirations Blood Pressure SpO2   11/19/22 1146 11/19/22 1146 11/19/22 1146 11/19/22 1146 11/19/22 1146   98 8 °F (37 1 °C) 94 22 154/65 96 %      Temp Source Heart Rate Source Patient Position - Orthostatic VS BP Location FiO2 (%)   11/19/22 1146 11/19/22 1146 11/19/22 1614 11/19/22 1614 --   Oral Monitor Lying Left arm       Pain Score       11/19/22 2117       No Pain             Orthostatic Vital Signs  Vitals:    11/19/22 1614 11/19/22 2008 11/19/22 2327 11/20/22 0755   BP: 142/100 134/55 139/78 138/59   Pulse: 89 93 91 91 Patient Position - Orthostatic VS: Lying   Lying       Physical Exam  Vitals and nursing note reviewed  Constitutional:       General: She is not in acute distress  Appearance: She is well-developed  She is obese  She is not diaphoretic  HENT:      Head: Normocephalic and atraumatic  Right Ear: External ear normal       Left Ear: External ear normal       Nose: Nose normal    Eyes:      General: Lids are normal  No scleral icterus  Cardiovascular:      Rate and Rhythm: Normal rate and regular rhythm  Heart sounds: Normal heart sounds  No murmur heard  No friction rub  No gallop  Pulmonary:      Effort: Pulmonary effort is normal  No respiratory distress  Breath sounds: Normal breath sounds  No wheezing or rales  Abdominal:      Palpations: Abdomen is soft  Tenderness: There is no abdominal tenderness  There is no guarding or rebound  Musculoskeletal:         General: No deformity  Normal range of motion  Cervical back: Normal range of motion and neck supple  Right lower leg: Tenderness present  2+ Edema present  Left lower le+ Edema present  Legs:    Skin:     General: Skin is warm and dry  Findings: Abrasion present  Neurological:      General: No focal deficit present  Mental Status: She is alert     Psychiatric:         Mood and Affect: Mood normal          Behavior: Behavior normal          ED Medications  Medications   albuterol (PROVENTIL HFA,VENTOLIN HFA) inhaler 2 puff (has no administration in time range)   apixaban (ELIQUIS) tablet 5 mg (5 mg Oral Given 22)   gabapentin (NEURONTIN) capsule 300 mg (300 mg Oral Given 22)   hydroxychloroquine (PLAQUENIL) tablet 200 mg (200 mg Oral Not Given 22 1134)   metoprolol tartrate (LOPRESSOR) tablet 25 mg (25 mg Oral Given 2241)   pantoprazole (PROTONIX) EC tablet 40 mg (40 mg Oral Given 22 0611)   predniSONE tablet 5 mg (5 mg Oral Given 11/20/22 0800)   furosemide (LASIX) injection 40 mg (40 mg Intravenous Given 11/19/22 1616)   furosemide (LASIX) injection 40 mg (40 mg Intravenous Given 11/20/22 1300)       Diagnostic Studies  Results Reviewed     Procedure Component Value Units Date/Time    Comprehensive metabolic panel [[id number]]  (Abnormal) Collected: 11/20/22 0606    Lab Status: Final result Specimen: Blood from Arm, Left Updated: 11/20/22 0707     Sodium 141 mmol/L      Potassium 4 3 mmol/L      Chloride 110 mmol/L      CO2 26 mmol/L      ANION GAP 5 mmol/L      BUN 25 mg/dL      Creatinine 1 28 mg/dL      Glucose 89 mg/dL      Glucose, Fasting 89 mg/dL      Calcium 8 9 mg/dL      Corrected Calcium 10 3 mg/dL      AST 26 U/L      ALT 28 U/L      Alkaline Phosphatase 60 U/L      Total Protein 6 7 g/dL      Albumin 2 2 g/dL      Total Bilirubin 0 45 mg/dL      eGFR 41 ml/min/1 73sq m     Narrative:      National Kidney Disease Foundation guidelines for Chronic Kidney Disease (CKD):   •  Stage 1 with normal or high GFR (GFR > 90 mL/min/1 73 square meters)  •  Stage 2 Mild CKD (GFR = 60-89 mL/min/1 73 square meters)  •  Stage 3A Moderate CKD (GFR = 45-59 mL/min/1 73 square meters)  •  Stage 3B Moderate CKD (GFR = 30-44 mL/min/1 73 square meters)  •  Stage 4 Severe CKD (GFR = 15-29 mL/min/1 73 square meters)  •  Stage 5 End Stage CKD (GFR <15 mL/min/1 73 square meters)  Note: GFR calculation is accurate only with a steady state creatinine    CBC and differential [[id number]]  (Abnormal) Collected: 11/20/22 0606    Lab Status: Final result Specimen: Blood from Arm, Left Updated: 11/20/22 0634     WBC 6 84 Thousand/uL      RBC 3 98 Million/uL      Hemoglobin 11 2 g/dL      Hematocrit 36 7 %      MCV 92 fL      MCH 28 1 pg      MCHC 30 5 g/dL      RDW 14 6 %      MPV 11 4 fL      Platelets 576 Thousands/uL      nRBC 0 /100 WBCs      Neutrophils Relative 72 %      Immat GRANS % 0 %      Lymphocytes Relative 13 %      Monocytes Relative 11 % Eosinophils Relative 3 %      Basophils Relative 1 %      Neutrophils Absolute 4 92 Thousands/µL      Immature Grans Absolute 0 02 Thousand/uL      Lymphocytes Absolute 0 90 Thousands/µL      Monocytes Absolute 0 72 Thousand/µL      Eosinophils Absolute 0 23 Thousand/µL      Basophils Absolute 0 05 Thousands/µL     HS Troponin I 4hr [[id number]]  (Normal) Collected: 11/19/22 1841    Lab Status: Final result Specimen: Blood from Arm, Right Updated: 11/19/22 1925     hs TnI 4hr 46 ng/L      Delta 4hr hsTnI -7 ng/L     COVID/FLU/RSV [[id number]]  (Normal) Collected: 11/19/22 1614    Lab Status: Final result Specimen: Nares from Nose Updated: 11/19/22 1729     SARS-CoV-2 Negative     INFLUENZA A PCR Negative     INFLUENZA B PCR Negative     RSV PCR Negative    Narrative:      FOR PEDIATRIC PATIENTS - copy/paste COVID Guidelines URL to browser: [email or web address]    SARS-CoV-2 assay is a Nucleic Acid Amplification assay intended for the  qualitative detection of nucleic acid from SARS-CoV-2 in nasopharyngeal  swabs  Results are for the presumptive identification of SARS-CoV-2 RNA  Positive results are indicative of infection with SARS-CoV-2, the virus  causing COVID-19, but do not rule out bacterial infection or co-infection  with other viruses  Laboratories within the [place] and its  territories are required to report all positive results to the appropriate  public health authorities  Negative results do not preclude SARS-CoV-2  infection and should not be used as the sole basis for treatment or other  patient management decisions  Negative results must be combined with  clinical observations, patient history, and epidemiological information  This test has not been FDA cleared or approved  This test has been authorized by FDA under an Emergency Use Authorization  (EUA)   This test is only authorized for the duration of time the  declaration that circumstances exist justifying the authorization of the  emergency use of an in vitro diagnostic tests for detection of SARS-CoV-2  virus and/or diagnosis of COVID-19 infection under section 564(b)(1) of  the Act, 21 U  S C  529NUT-6(E)(9), unless the authorization is terminated  or revoked sooner  The test has been validated but independent review by FDA  and CLIA is pending  Test performed using Submittable GeneXpert: This RT-PCR assay targets N2,  a region unique to SARS-CoV-2  A conserved region in the E-gene was chosen  for pan-Sarbecovirus detection which includes SARS-CoV-2  According to CMS-2020-01-R, this platform meets the definition of high-throughput technology      HS Troponin I 2hr [[id number]]  (Normal) Collected: 11/19/22 1503    Lab Status: Final result Specimen: Blood from Arm, Right Updated: 11/19/22 1603     hs TnI 2hr 45 ng/L      Delta 2hr hsTnI -8 ng/L     Comprehensive metabolic panel [[id number]]  (Abnormal) Collected: 11/19/22 1255    Lab Status: Final result Specimen: Blood from Arm, Right Updated: 11/19/22 1350     Sodium 141 mmol/L      Potassium 4 7 mmol/L      Chloride 107 mmol/L      CO2 28 mmol/L      ANION GAP 6 mmol/L      BUN 22 mg/dL      Creatinine 1 34 mg/dL      Glucose 93 mg/dL      Calcium 9 7 mg/dL      Corrected Calcium 10 7 mg/dL      AST 27 U/L      ALT 37 U/L      Alkaline Phosphatase 74 U/L      Total Protein 8 2 g/dL      Albumin 2 8 g/dL      Total Bilirubin 0 48 mg/dL      eGFR 39 ml/min/1 73sq m     Narrative:      [place] guidelines for Chronic Kidney Disease (CKD):   •  Stage 1 with normal or high GFR (GFR > 90 mL/min/1 73 square meters)  •  Stage 2 Mild CKD (GFR = 60-89 mL/min/1 73 square meters)  •  Stage 3A Moderate CKD (GFR = 45-59 mL/min/1 73 square meters)  •  Stage 3B Moderate CKD (GFR = 30-44 mL/min/1 73 square meters)  •  Stage 4 Severe CKD (GFR = 15-29 mL/min/1 73 square meters)  •  Stage 5 End Stage CKD (GFR <15 mL/min/1 73 square meters)  Note: GFR calculation is accurate only with a steady state creatinine    HS Troponin 0hr (reflex protocol) [[id number]]  (Abnormal) Collected: 11/19/22 1255    Lab Status: Final result Specimen: Blood from Arm, Right Updated: 11/19/22 1334     hs TnI 0hr 53 ng/L     NT-BNP PRO [[id number]]  (Abnormal) Collected: 11/19/22 1255    Lab Status: Final result Specimen: Blood from Arm, Right Updated: 11/19/22 1328     NT-proBNP 1,717 pg/mL     CBC and differential [[id number]]  (Abnormal) Collected: 11/19/22 1255    Lab Status: Final result Specimen: Blood from Arm, Right Updated: 11/19/22 1313     WBC 10 72 Thousand/uL      RBC 4 55 Million/uL      Hemoglobin 12 8 g/dL      Hematocrit 42 0 %      MCV 92 fL      MCH 28 1 pg      MCHC 30 5 g/dL      RDW 14 6 %      MPV 11 6 fL      Platelets 496 Thousands/uL      nRBC 0 /100 WBCs      Neutrophils Relative 83 %      Immat GRANS % 0 %      Lymphocytes Relative 6 %      Monocytes Relative 9 %      Eosinophils Relative 2 %      Basophils Relative 0 %      Neutrophils Absolute 8 82 Thousands/µL      Immature Grans Absolute 0 04 Thousand/uL      Lymphocytes Absolute 0 67 Thousands/µL      Monocytes Absolute 0 98 Thousand/µL      Eosinophils Absolute 0 17 Thousand/µL      Basophils Absolute 0 04 Thousands/µL                  CT head without contrast   Final Result by Connor Pleitez MD (11/19 1455)      No acute intracranial abnormality  Chronic microangiopathic changes  Air-fluid level in the right maxillary sinus, which could represent acute sinusitis (series 3 image 2 )            Workstation performed: [id number]         VAS lower limb venous duplex study, complete bilateral   Final Result by Leo Hernandez MD (11/19 2131)      XR knee 4+ views Right injury   Final Result by Mari Mendez MD (11/19 2350)      Osteoarthritis with no acute osseous abnormality              Workstation performed: [id number]         XR tibia fibula 2 views RIGHT   Final Result by Carter Thomas MD (11/19 9580)      No acute osseous abnormality  Workstation performed: [id number]         XR chest 1 view portable   Final Result by Carter Thomas MD (11/19 2923)      Minimal CHF  Workstation performed: [id number]               Procedures  ECG 12 Lead Documentation Only    Date/Time: 11/20/2022 4:33 PM  Performed by: Ron Cardenas DO  Authorized by: Ron Cardenas DO     ECG reviewed by me, the ED Provider: yes    Patient location:  ED  Interpretation:     Interpretation: normal    Rate:     ECG rate:  85    ECG rate assessment: normal    Rhythm:     Rhythm: sinus rhythm    Ectopy:     Ectopy: none    QRS:     QRS axis:  Normal  Conduction:     Conduction: normal    ST segments:     ST segments:  Normal  T waves:     T waves: normal            ED Course  ED Course as of 11/20/22 1641   Sat Nov 19, 2022   1314 WBC(!): 10 72   1314 Hemoglobin: 12 8   1314 Platelet Count: 199   1332 NT-proBNP(!): 1,717   1335 hs TnI 0hr(!): 53   1457 CT head without contrast  No acute intracranial abnormality  Chronic microangiopathic changes      Air-fluid level in the right maxillary sinus, which could represent acute sinusitis    1500 Patient re-evaluated  Resting comfortably  Discussed results and findings  Given abnormal labs and LE swelling (consistent with mild CHF) recommended admission  Patient in agreement  Sun Nov 20, 2022   1518 Spoke with SLIM  Accepts admission                                       MDM  Number of Diagnoses or Management Options  Elevated brain natriuretic peptide (BNP) level  Swelling of lower extremity  Diagnosis management comments: Patient is a 74 y o  female who presents to the ED for lower extremity swelling  Patient non-toxic, well appearing  Vitals are stable  Differential for leg swelling includes but is not limited to: CHF, lymphedema  Presentation not consistent with compartment syndrome      Although abrasions are present on the leg, low suspicion for fracture, dislocation  Plan: CTH given fall, LE plain films, cardiac workup, reassessment                  Portions of the record may have been created with voice recognition software  Occasional wrong word or "sound a like" substitutions may have occurred due to the inherent limitations of voice recognition software  Read the chart carefully and recognize, using context, where substitutions have occurred  Amount and/or Complexity of Data Reviewed  Clinical lab tests: ordered  Tests in the radiology section of CPT®: ordered  Tests in the medicine section of CPT®: ordered  Independent visualization of images, tracings, or specimens: yes    Risk of Complications, Morbidity, and/or Mortality  Presenting problems: moderate  Diagnostic procedures: moderate  Management options: moderate    Patient Progress  Patient progress: stable      Disposition  Final diagnoses:   Swelling of lower extremity   Elevated brain natriuretic peptide (BNP) level     Time reflects when diagnosis was documented in both MDM as applicable and the Disposition within this note     Time User Action Codes Description Comment    11/19/2022  3:19 PM Jose Antonio Delaney [[id number]] Swelling of lower extremity     11/20/2022  4:33 PM Jose Antonio Delaney [[id number]] Elevated brain natriuretic peptide (BNP) level       ED Disposition     ED Disposition   Admit    Condition   Stable    Date/Time   Sun Nov 20, 2022  4:33 PM    Comment   Case was discussed with Dr Honey Mendoza and the patient's admission status was agreed to be Admission Status: observation status to the service of Dr Honey Mendoza           Follow-up Information    None         Current Discharge Medication List      CONTINUE these medications which have NOT CHANGED    Details   acetaminophen (TYLENOL) 325 mg tablet Take 2 tablets (650 mg total) by mouth every 4 (four) hours as needed for mild pain, headaches or fever    Qty: 30 tablet, Refills: 0 albuterol (Ventolin HFA) 90 mcg/act inhaler Inhale 2 puffs every 6 (six) hours as needed for wheezing  Qty: 18 g, Refills: 0    Comments: Substitution to a formulary equivalent within the same pharmaceutical class is authorized    Associated Diagnoses: Acute bronchitis, unspecified organism      alendronate (FOSAMAX) 70 mg tablet Take by mouth every 7 days       apixaban (ELIQUIS) 5 mg Take 1 tablet (5 mg total) by mouth 2 (two) times a day  Qty: 60 tablet, Refills: 0    Comments: Pt to schedule follow up visit  Associated Diagnoses: New onset a-fib ([address] )      cholecalciferol (VITAMIN D3) 1,000 units tablet Take 1 tablet (1,000 Units total) by mouth daily  Qty: 90 tablet, Refills: 0    Associated Diagnoses: Vitamin D deficiency      Clobetasol Propionate E 0 05 % emollient cream APPLY TWICE A DAY FOR PALM SKIN DERMITITS      COVID-19 At-Home Test KIT Use as directed  Qty: 2 kit, Refills: 0    Associated Diagnoses: Acute bronchitis, unspecified organism      gabapentin (Neurontin) 100 mg capsule Take 1 capsule in AM and 3 capsules in PM  Qty: 120 capsule, Refills: 1    Comments: New dose as of 4/21/22  Associated Diagnoses: Lumbar radiculopathy      guaifenesin-codeine (GUAIFENESIN AC) 100-10 MG/5ML liquid Take 5 mL by mouth 3 (three) times a day as needed for cough  Qty: 180 mL, Refills: 0    Associated Diagnoses: Acute bronchitis, unspecified organism      hydrochlorothiazide (HYDRODIURIL) 12 5 mg tablet Take 1 tablet (12 5 mg total) by mouth daily  Qty: 90 tablet, Refills: 0    Associated Diagnoses: Benign essential hypertension      hydroxychloroquine (PLAQUENIL) 200 mg tablet Take 1 tablet (200 mg total) by mouth 2 (two) times a day  Qty: 60 tablet, Refills: 0    Associated Diagnoses: Rheumatoid arthritis involving multiple sites, unspecified whether rheumatoid factor present (HCC)      ketoconazole (NIZORAL) 2 % cream Apply topically 2 (two) times a day  Qty: 60 g, Refills: 0    Associated Diagnoses: Rash metoprolol tartrate (LOPRESSOR) 25 mg tablet Take 1 tablet (25 mg total) by mouth every 12 (twelve) hours  Qty: 180 tablet, Refills: 0    Associated Diagnoses: Paroxysmal atrial fibrillation (HCC)      omeprazole (PriLOSEC) 20 mg delayed release capsule Take 1 capsule (20 mg total) by mouth daily  Qty: 90 capsule, Refills: 3    Associated Diagnoses: Gastroesophageal reflux disease without esophagitis      potassium chloride (K-DUR,KLOR-CON) 10 mEq tablet Take 1 tablet (10 mEq total) by mouth daily  Qty: 90 tablet, Refills: 0    Comments: Pt to schedule follow up visit  Associated Diagnoses: Atrial fibrillation, new onset (HCC)      predniSONE 5 mg tablet Take 1 tablet (5 mg total) by mouth 2 (two) times a day with meals  Qty: 60 tablet, Refills: 0    Associated Diagnoses: Pustular psoriasis      triamcinolone (KENALOG) 0 1 % cream Apply 1 application topically 2 (two) times a day for 14 days To affected area  Qty: 28 g, Refills: 0    Associated Diagnoses: Pustular psoriasis      white petrolatum-mineral oil (EUCERIN,HYDROCERIN) cream Apply topically 3 (three) times a day  Qty: 454 g, Refills: 0    Associated Diagnoses: Psoriasis           No discharge procedures on file  PDMP Review       Value Time User    PDMP Reviewed  Yes 11/16/2022 12:00  Anibal Liao DO           ED Provider  Attending physically available and evaluated [facility]  I managed the patient along with the ED Attending      Electronically Signed by         Kenzie Sanderson DO  11/20/22 9670

## 2022-11-20 NOTE — PROGRESS NOTES
[facility]  Progress Note - Camellia Hammans 1950, 74 y o  female MRN: [id number]  Unit/Bed#: CW2 211-02 Encounter: [id number]  Primary Care Provider: Ale Gibbs DO   Date and time admitted to hospital: 11/19/2022 11:38 AM    * Leg swelling  Assessment & Plan  · Bilateral lower extremity edema, BNP elevated  · Dopplers negative for DVTs  · Check echocardiogram  · Check UA for protein  · PT eval   · Good response to lasix 40mg IV yest, will re-dose today        Paroxysmal atrial fibrillation (HCC)  Assessment & Plan  History of paroxysmal AFib on Eliquis and rate controlled metoprolol, will continue    Morbid obesity ([facility] [address] )  Assessment & Plan  As evidenced by BMI over 45, discussed lifestyle nutritional changes    Benign essential hypertension  Assessment & Plan  History of hypertension continue Lopressor 25 mg b i d  On diuresis will hold hydrochlorothiazide    Sarcoid  Assessment & Plan  History of sarcoid falls Rheumatology continue outpatient meds    GERD (gastroesophageal reflux disease)  Assessment & Plan  Continue Protonix    [facility] )  Assessment & Plan  Patient follows with Rheumatology will continue on Plaquenil and prednisone 5 mg b i d         VTE Pharmacologic Prophylaxis:   High Risk (Score >/= 5) - Pharmacological DVT Prophylaxis Ordered: apixaban (Eliquis)  Sequential Compression Devices Ordered  Patient Centered Rounds: d/w RN  Discussions with Specialists or Other Care Team Provider: none    Education and Discussions with Family / Patient: Updated  (daughter) via phone  Time Spent for Care: 30 minutes  More than 50% of total time spent on counseling and coordination of care as described above      Current Length of Stay: 0 day(s)  Current Patient Status: Observation   Certification Statement: The patient will continue to require additional inpatient hospital stay due to IV above  Discharge Plan: Anticipate discharge in >72 hrs to discharge location to be determined pending rehab evaluations  Code Status: Level 1 - Full Code    Subjective:   No acute concerns, legs less swollen but still ongoing  Objective:     Vitals:   Temp (24hrs), Av 9 °F (37 2 °C), Min:98 8 °F (37 1 °C), Max:99 °F (37 2 °C)    Temp:  [98 8 °F (37 1 °C)-99 °F (37 2 °C)] 99 °F (37 2 °C)  HR:  [89-93] 91  Resp:  [20] 20  BP: (134-142)/() 138/59  SpO2:  [96 %-100 %] 99 %  Body mass index is 50 38 kg/m²  Input and Output Summary (last 24 hours): Intake/Output Summary (Last 24 hours) at 2022 1234  Last data filed at 2022 0755  Gross per 24 hour   Intake 960 ml   Output 300 ml   Net 660 ml       Physical Exam:   Physical Exam  Vitals reviewed  Constitutional:       General: She is not in acute distress  Appearance: She is not toxic-appearing  HENT:      Mouth/Throat:      Mouth: Mucous membranes are moist    Eyes:      General: No scleral icterus  Cardiovascular:      Rate and Rhythm: Normal rate and regular rhythm  Pulmonary:      Effort: Pulmonary effort is normal  No respiratory distress  Breath sounds: Normal breath sounds  Abdominal:      General: Bowel sounds are normal       Palpations: Abdomen is soft  Tenderness: There is no abdominal tenderness  Musculoskeletal:      Right lower leg: Edema present  Left lower leg: Edema present  Neurological:      General: No focal deficit present  Mental Status: She is alert and oriented to person, place, and time     Psychiatric:         Mood and Affect: Mood normal          Behavior: Behavior normal             Additional Data:     Labs:  Results from last 7 days   Lab Units 22  0606   WBC Thousand/uL 6 84   HEMOGLOBIN g/dL 11 2*   HEMATOCRIT % 36 7   PLATELETS Thousands/uL 195   NEUTROS PCT % 72   LYMPHS PCT % 13*   MONOS PCT % 11   EOS PCT % 3     Results from last 7 days   Lab Units 22  0606   SODIUM mmol/L 141   POTASSIUM mmol/L 4  3   CHLORIDE mmol/L 110*   CO2 mmol/L 26   BUN mg/dL 25   CREATININE mg/dL 1 28   ANION GAP mmol/L 5   CALCIUM mg/dL 8 9   ALBUMIN g/dL 2 2*   TOTAL BILIRUBIN mg/dL 0 45   ALK PHOS U/L 60   ALT U/L 28   AST U/L 26   GLUCOSE RANDOM mg/dL 89                       Lines/Drains:  Invasive Devices     Peripheral Intravenous Line  Duration           Peripheral IV 11/19/22 Right Wrist 1 day    Peripheral IV 11/19/22 Right Antecubital <1 day                      Imaging: Reviewed radiology reports from this admission including: ultrasound(s)    Recent Cultures (last 7 days):         Last 24 Hours Medication List:   Current Facility-Administered Medications   Medication Dose Route Frequency Provider Last Rate   • albuterol  2 puff Inhalation Q6H PRN Jaron Banai, DO     • apixaban  5 mg Oral BID Jaron Banai, DO     • furosemide  40 mg Intravenous Once Jas Mathisa MD     • gabapentin  300 mg Oral HS Jaron Banai, DO     • hydroxychloroquine  200 mg Oral BID Jaron Banai, DO     • metoprolol tartrate  25 mg Oral Q12H [address] Jaron Banai, DO     • pantoprazole  40 mg Oral Early Morning Jaron Banai, DO     • predniSONE  5 mg Oral BID With Meals Isamar Alvarez DO          Today, Patient Was Seen By: Jas Mathias MD    **Please Note: This note may have been constructed using a voice recognition system  **

## 2022-11-20 NOTE — PLAN OF CARE
Problem: Potential for Falls  Goal: Patient will remain free of falls  Description: INTERVENTIONS:  - Educate patient/family on patient safety including physical limitations  - Instruct patient to call for assistance with activity   - Consult OT/PT to assist with strengthening/mobility   - Keep Call bell within reach  - Keep bed low and locked with side rails adjusted as appropriate  - Keep care items and personal belongings within reach  - Initiate and maintain comfort rounds  - Make Fall Risk Sign visible to staff  - Offer Toileting every 2 Hours, in advance of need  - Initiate/Maintain bed alarm  - Obtain necessary fall risk management equipment: bd alarm, chair alarm   - Apply yellow socks and bracelet for high fall risk patients  - Consider moving patient to room near nurses station  Outcome: Progressing     Problem: Prexisting or High Potential for Compromised Skin Integrity  Goal: Skin integrity is maintained or improved  Description: INTERVENTIONS:  - Identify patients at risk for skin breakdown  - Assess and monitor skin integrity  - Assess and monitor nutrition and hydration status  - Monitor labs   - Assess for incontinence   - Turn and reposition patient  - Assist with mobility/ambulation  - Relieve pressure over bony prominences  - Avoid friction and shearing  - Provide appropriate hygiene as needed including keeping skin clean and dry  - Evaluate need for skin moisturizer/barrier cream  - Collaborate with interdisciplinary team   - Patient/family teaching  - Consider wound care consult   Outcome: Progressing     Problem: MOBILITY - ADULT  Goal: Maintain or return to baseline ADL function  Description: INTERVENTIONS:  -  Assess patient's ability to carry out ADLs; assess patient's baseline for ADL function and identify physical deficits which impact ability to perform ADLs (bathing, care of mouth/teeth, toileting, grooming, dressing, etc )  - Assess/evaluate cause of self-care deficits   - Assess range of motion  - Assess patient's mobility; develop plan if impaired  - Assess patient's need for assistive devices and provide as appropriate  - Encourage maximum independence but intervene and supervise when necessary  - Involve family in performance of ADLs  - Assess for home care needs following discharge   - Consider OT consult to assist with ADL evaluation and planning for discharge  - Provide patient education as appropriate  Outcome: Progressing

## 2022-11-20 NOTE — ED ATTENDING ATTESTATION
11/19/2022  ISeda MD, saw and evaluated the patient  I have discussed the patient with the resident/non-physician practitioner and agree with the resident's/non-physician practitioner's findings, Plan of Care, and MDM as documented in the resident's/non-physician practitioner's note, except where noted  All available labs and Radiology studies were reviewed  I was present for key portions of any procedure(s) performed by the resident/non-physician practitioner and I was immediately available to provide assistance  At this point I agree with the current assessment done in the Emergency Department  I have conducted an independent evaluation of this patient a history and physical is as follows:    OA: 75 y/o f with multiple medical problems including RA, sarcoid, htn, afib who presents with generalized weakness, falls and worsening b/l lower extremity edema and pain  + difficulty with ambulation  Was unable to get out of bed yesterday and slid to the floor  + pain in lower legs, R > L, no n/v  No numbnes/weakness/tingilng  No dizziness/lightheadedness  No cp/sob  PE, NAD, VSS, NC/AT, MMM, neck supple/FROM, RR/-murmurs, lungs decreased at bases with poor effort and limited secondary to pt's body habitus, + significant LE swelling, abrasion to R medial tib-fib, intact pulses, abd soft, +BS, AAO  A/p ambulatory dysfunction, LE edema, eval with labs, imagig, duplex, monitor   Likely admission    ED Course         Critical Care Time  Procedures

## 2022-11-21 ENCOUNTER — APPOINTMENT (INPATIENT)
Dept: RADIOLOGY | Facility: HOSPITAL | Age: 72
End: 2022-11-21

## 2022-11-21 PROBLEM — R53.83 LETHARGY: Status: ACTIVE | Noted: 2022-11-21

## 2022-11-21 LAB
ANION GAP SERPL CALCULATED.3IONS-SCNC: 7 MMOL/L (ref 4–13)
AORTIC VALVE MEAN VELOCITY: 9.7 M/S
APICAL FOUR CHAMBER EJECTION FRACTION: 62 %
AV LVOT MEAN GRADIENT: 3 MMHG
AV LVOT PEAK GRADIENT: 5 MMHG
AV MEAN GRADIENT: 4 MMHG
AV PEAK GRADIENT: 8 MMHG
AV VELOCITY RATIO: 0.8
BASE EX.OXY STD BLDV CALC-SCNC: 95.3 % (ref 60–80)
BASE EXCESS BLDA CALC-SCNC: 5 MMOL/L (ref -2–3)
BASE EXCESS BLDV CALC-SCNC: 5.4 MMOL/L
BUN SERPL-MCNC: 23 MG/DL (ref 5–25)
CA-I BLD-SCNC: 1.2 MMOL/L (ref 1.12–1.32)
CALCIUM SERPL-MCNC: 9.1 MG/DL (ref 8.3–10.1)
CHLORIDE SERPL-SCNC: 105 MMOL/L (ref 96–108)
CO2 SERPL-SCNC: 29 MMOL/L (ref 21–32)
CREAT SERPL-MCNC: 1.35 MG/DL (ref 0.6–1.3)
DOP CALC AO PEAK VEL: 1.37 M/S
DOP CALC AO VTI: 28.11 CM
DOP CALC LVOT PEAK VEL VTI: 23.17 CM
DOP CALC LVOT PEAK VEL: 1.09 M/S
DOP CALC MV VTI: 31.33 CM
E WAVE DECELERATION TIME: 198 MS
ERYTHROCYTE [DISTWIDTH] IN BLOOD BY AUTOMATED COUNT: 14.5 % (ref 11.6–15.1)
FRACTIONAL SHORTENING: 32 (ref 28–44)
GFR SERPL CREATININE-BSD FRML MDRD: 38 ML/MIN/1.73SQ M
GLUCOSE SERPL-MCNC: 123 MG/DL (ref 65–140)
GLUCOSE SERPL-MCNC: 92 MG/DL (ref 65–140)
HCO3 BLDA-SCNC: 29.3 MMOL/L (ref 22–28)
HCO3 BLDV-SCNC: 28.6 MMOL/L (ref 24–30)
HCT VFR BLD AUTO: 39 % (ref 34.8–46.1)
HCT VFR BLD CALC: 38 % (ref 34.8–46.1)
HGB BLD-MCNC: 11.7 G/DL (ref 11.5–15.4)
HGB BLDA-MCNC: 12.9 G/DL (ref 11.5–15.4)
INTERVENTRICULAR SEPTUM IN DIASTOLE (PARASTERNAL SHORT AXIS VIEW): 1.2 CM
INTERVENTRICULAR SEPTUM: 1.2 CM (ref 0.6–1.1)
LAAS-AP2: 23 CM2
LAAS-AP4: 23.8 CM2
LEFT ATRIUM SIZE: 4 CM
LEFT INTERNAL DIMENSION IN SYSTOLE: 2.3 CM (ref 2.1–4)
LEFT VENTRICULAR INTERNAL DIMENSION IN DIASTOLE: 3.4 CM (ref 3.5–6)
LEFT VENTRICULAR POSTERIOR WALL IN END DIASTOLE: 1.3 CM
LEFT VENTRICULAR STROKE VOLUME: 28 ML
LVSV (TEICH): 28 ML
MCH RBC QN AUTO: 27.5 PG (ref 26.8–34.3)
MCHC RBC AUTO-ENTMCNC: 30 G/DL (ref 31.4–37.4)
MCV RBC AUTO: 92 FL (ref 82–98)
MV E'TISSUE VEL-LAT: 9 CM/S
MV E'TISSUE VEL-SEP: 8 CM/S
MV MEAN GRADIENT: 3 MMHG
MV PEAK A VEL: 0.82 M/S
MV PEAK E VEL: 104 CM/S
MV PEAK GRADIENT: 7 MMHG
MV STENOSIS PRESSURE HALF TIME: 58 MS
MV VALVE AREA P 1/2 METHOD: 3.79
O2 CT BLDV-SCNC: 17.1 ML/DL
PCO2 BLD: 31 MMOL/L (ref 21–32)
PCO2 BLD: 40.1 MM HG (ref 36–44)
PCO2 BLDV: 36.7 MM HG (ref 42–50)
PH BLD: 7.47 [PH] (ref 7.35–7.45)
PH BLDV: 7.51 [PH] (ref 7.3–7.4)
PLATELET # BLD AUTO: 234 THOUSANDS/UL (ref 149–390)
PMV BLD AUTO: 11.4 FL (ref 8.9–12.7)
PO2 BLD: 37 MM HG (ref 75–129)
PO2 BLDV: 103.5 MM HG (ref 35–45)
POTASSIUM BLD-SCNC: 3.7 MMOL/L (ref 3.5–5.3)
POTASSIUM SERPL-SCNC: 3.7 MMOL/L (ref 3.5–5.3)
RBC # BLD AUTO: 4.25 MILLION/UL (ref 3.81–5.12)
RIGHT ATRIAL 2D VOLUME: 42 ML
RIGHT ATRIUM AREA SYSTOLE A4C: 16 CM2
RIGHT VENTRICLE ID DIMENSION: 3.7 CM
SAO2 % BLD FROM PO2: 74 % (ref 60–85)
SL CV LEFT ATRIUM LENGTH A2C: 5.8 CM
SL CV LV EF: 55
SL CV PED ECHO LEFT VENTRICLE DIASTOLIC VOLUME (MOD BIPLANE) 2D: 46 ML
SL CV PED ECHO LEFT VENTRICLE SYSTOLIC VOLUME (MOD BIPLANE) 2D: 18 ML
SODIUM BLD-SCNC: 139 MMOL/L (ref 136–145)
SODIUM SERPL-SCNC: 141 MMOL/L (ref 135–147)
SPECIMEN SOURCE: ABNORMAL
WBC # BLD AUTO: 10.59 THOUSAND/UL (ref 4.31–10.16)

## 2022-11-21 RX ORDER — POTASSIUM CHLORIDE 20 MEQ/1
20 TABLET, EXTENDED RELEASE ORAL ONCE
Status: COMPLETED | OUTPATIENT
Start: 2022-11-21 | End: 2022-11-21

## 2022-11-21 RX ORDER — NYSTATIN 100000 [USP'U]/G
POWDER TOPICAL 2 TIMES DAILY
Status: DISCONTINUED | OUTPATIENT
Start: 2022-11-21 | End: 2022-11-25 | Stop reason: HOSPADM

## 2022-11-21 RX ORDER — LANOLIN ALCOHOL/MO/W.PET/CERES
CREAM (GRAM) TOPICAL 4 TIMES DAILY PRN
Status: DISCONTINUED | OUTPATIENT
Start: 2022-11-21 | End: 2022-11-25 | Stop reason: HOSPADM

## 2022-11-21 RX ORDER — FUROSEMIDE 10 MG/ML
40 INJECTION INTRAMUSCULAR; INTRAVENOUS
Status: DISCONTINUED | OUTPATIENT
Start: 2022-11-21 | End: 2022-11-21

## 2022-11-21 RX ADMIN — FUROSEMIDE 40 MG: 10 INJECTION, SOLUTION INTRAMUSCULAR; INTRAVENOUS at 13:04

## 2022-11-21 RX ADMIN — METOPROLOL TARTRATE 25 MG: 25 TABLET, FILM COATED ORAL at 08:44

## 2022-11-21 RX ADMIN — HYDROXYCHLOROQUINE SULFATE 200 MG: 200 TABLET ORAL at 08:36

## 2022-11-21 RX ADMIN — GABAPENTIN 300 MG: 300 CAPSULE ORAL at 22:05

## 2022-11-21 RX ADMIN — APIXABAN 5 MG: 5 TABLET, FILM COATED ORAL at 18:05

## 2022-11-21 RX ADMIN — APIXABAN 5 MG: 5 TABLET, FILM COATED ORAL at 08:35

## 2022-11-21 RX ADMIN — METOPROLOL TARTRATE 25 MG: 25 TABLET, FILM COATED ORAL at 22:08

## 2022-11-21 RX ADMIN — HYDROXYCHLOROQUINE SULFATE 200 MG: 200 TABLET ORAL at 18:43

## 2022-11-21 RX ADMIN — SODIUM CHLORIDE, SODIUM LACTATE, POTASSIUM CHLORIDE, AND CALCIUM CHLORIDE 500 ML: .6; .31; .03; .02 INJECTION, SOLUTION INTRAVENOUS at 22:06

## 2022-11-21 RX ADMIN — PREDNISONE 5 MG: 5 TABLET ORAL at 18:05

## 2022-11-21 RX ADMIN — POTASSIUM CHLORIDE 20 MEQ: 1500 TABLET, EXTENDED RELEASE ORAL at 21:00

## 2022-11-21 RX ADMIN — PREDNISONE 5 MG: 5 TABLET ORAL at 08:36

## 2022-11-21 NOTE — CASE MANAGEMENT
Case Management Assessment & Discharge Planning Note    Patient name Lesley Crawford  Location CW2 211/CW2 211-02 MRN [id number]  : 1950 Date 2022       Current Admission Date: 2022  Current Admission Diagnosis:Leg swelling   Patient Active Problem List    Diagnosis Date Noted   • Lethargy 2022   • Leg swelling 2022   • Acute bronchitis 2022   • Assistance needed with transportation 09/15/2022   • Abnormal CT of the chest 2022   • Pustular psoriasis 2022   • Elevated troponin 2022   • COVID-19 2022   • Paroxysmal atrial fibrillation ([facility] [address] ) 01/10/2022   • Hyperparathyroidism ([facility] [address] ) 2021   • Murmur, cardiac 2021   • BPPV (benign paroxysmal positional vertigo) 2018   • Seasonal allergic rhinitis due to pollen 2018   • Erythroderma 10/27/2017   • Osteoporosis 2016   • Rheumatoid arthritis ([address] ) 2016   • GERD (gastroesophageal reflux disease) 2016   • Sarcoid 2016   • Morbid obesity ([address] ) 2016   • Vitamin D deficiency 2015   • Benign essential hypertension 2014   • Lumbar radiculopathy 2014      LOS (days): 1  Geometric Mean LOS (GMLOS) (days):   Days to GMLOS:     OBJECTIVE:    Risk of Unplanned Readmission Score: 18 59         Current admission status: Inpatient       Preferred Pharmacy:   [address] [place] [address] #[id number] [address] [address]  Phone: [phone] Fax: [phone] [facility] [address]  Phone: [phone] Fax: [phone]    Primary Care Provider: Sarah Muhammad DO    Primary Insurance: [facility]  Secondary Insurance:     ASSESSMENT:  Anel Schroeder Proxies    There are no active Health Care Proxies on file                   Readmission Root Cause  30 Day Readmission: No    Patient Information  Admitted from[de-identified] Home  Mental Status: Alert  During Assessment patient was accompanied by: Daughter  Assessment information provided by[de-identified] Patient, Daughter  Primary Caregiver: Family  Caregiver's Name[de-identified] Elli Moreno, daughter  Caregiver's Relationship to Patient[de-identified] Family Member  Caregiver's Telephone Number[de-identified] [phone]  Support Systems: Family members, Daughter  [place] of Residence: [address] [place] [address] do you live in?: [facility] entry access options  Select all that apply : Stairs  Number of steps to enter home : 1  Type of Current Residence: 2 story home  Upon entering residence, is there a bedroom on the main floor (no further steps)?: No  A bedroom is located on the following floor levels of residence (select all that apply):: 2nd Floor  Upon entering residence, is there a bathroom on the main floor (no further steps)?: No  Indicate which floors of current residence have a bathroom (select all the apply):: 2nd Floor  Number of steps to 2nd floor from main floor: One Flight  In the last 12 months, was there a time when you were not able to pay the mortgage or rent on time?: No  In the last 12 months, was there a time when you did not have a steady place to sleep or slept in a shelter (including now)?: No  Homeless/housing insecurity resource given?: N/A  Living Arrangements: Lives w/ Daughter  Is patient a ?: No    Activities of Daily Living Prior to Admission  Functional Status: Assistance  Completes ADLs independently?: No  Level of ADL dependence: Assistance  Ambulates independently?: Yes  Does patient use assisted devices?: Yes  Assisted Devices (DME) used:  Shower Chair, Walker, Bedside Commode  Does patient currently own DME?: Yes  What DME does the patient currently own?: Bedside Commode, Shower Chair, Reji Scripture  Does the patient have a history of Short-Term Rehab?: Yes ([address])  Does patient have a history of HHC?: Yes ([address])  Does patient currently have [facility]?: No Patient Information Continued  Income Source: Pension/shelter  Does patient have prescription coverage?: Yes  Within the past 12 months, you worried that your food would run out before you got the money to buy more : Never true  Within the past 12 months, the food you bought just didn't last and you didn't have money to get more : Never true  Food insecurity resource given?: N/A  Does patient receive dialysis treatments?: No  Does patient have a history of substance abuse?: No  Does patient have a history of Mental Health Diagnosis?: No         Means of Transportation  In the past 12 months, has lack of transportation kept you from medical appointments or from getting medications?: No  In the past 12 months, has lack of transportation kept you from meetings, work, or from getting things needed for daily living?: No        DISCHARGE DETAILS:    Discharge planning discussed with[de-identified] patient, daughter at bedside  Freedom of Choice: Yes  Comments - Freedom of Choice: open to blanket referral to area facilities  CM contacted family/caregiver?: Yes  Were Treatment Team discharge recommendations reviewed with patient/caregiver?: Yes  Did patient/caregiver verbalize understanding of patient care needs?: Yes  Were patient/caregiver advised of the risks associated with not following Treatment Team discharge recommendations?: Yes    Contacts  Patient Contacts: Shikha Carreon, daughter  Relationship to Patient[de-identified] Family  Contact Method: Phone  Phone Number: [phone]  Reason/Outcome: Continuity of Care, Emergency Contact, Discharge Planning              Other Referral/Resources/Interventions Provided:  Interventions: Short Term Rehab         Treatment Team Recommendation: Short Term Rehab  Discharge Destination Plan[de-identified] Short Term Rehab  Transport at Discharge : Wheelchair Harshad Yun received discharge checklist   Content reviewed    Patient/caregiver encouraged to participate in discharge plan of care prior to discharge home  CM reviewed d/c planning process including the following: identifying help at home, patient preference for d/c planning needs, Discharge Lounge, [facility] Meds to Bed program, availability of treatment team to discuss questions or concerns patient and/or family may have regarding understanding medications and recognizing signs and symptoms once discharged  CM also encouraged patient to follow up with all recommended appointments after discharge  Patient advised of importance for patient and family to participate in managing patient’s medical well being  Additional Comments: Introduced self and role to patient and daughter at bedside  Patient lives at home with daughter who is primary caregiver, patient stays on the second floor most of the time  Currently recommendation for STR, patient/family open to blanket referrals in the [place] area, will need insurance authorization  CM to follow

## 2022-11-21 NOTE — ASSESSMENT & PLAN NOTE
Acute diastolic congestive heart failure  2D echo EF 22%, grade 2 diastolic dysfunction  Now on Lasix 40 mg p o   Daily  Continue metoprolol  Monitor I/O daily weights  Cardiology input noted

## 2022-11-21 NOTE — PLAN OF CARE
Problem: OCCUPATIONAL THERAPY ADULT  Goal: Performs self-care activities at highest level of function for planned discharge setting  See evaluation for individualized goals  Description: Treatment Interventions: ADL retraining, Functional transfer training, UE strengthening/ROM, Endurance training, Cognitive reorientation, Patient/family training, Compensatory technique education, Continued evaluation, Energy conservation, Activityengagement          See flowsheet documentation for full assessment, interventions and recommendations  Note: Limitation: Decreased ADL status, Decreased UE ROM, Decreased UE strength, Decreased Safe judgement during ADL, Decreased cognition, Decreased endurance, Decreased self-care trans, Decreased high-level ADLs  Prognosis: Fair  Assessment: Pt is a 74 y o  female seen for OT evaluation s/p admit to B on 11/19/2022 w/ Leg swelling  Comorbidities affecting pt's functional performance at time of assessment include: RA, GERD, sarcoid, paroxysmal atrial fibrillation, lethargy  Personal factors affecting pt at time of IE include:steps to enter environment, difficulty performing ADLS, difficulty performing IADLS , limited insight into deficits, flat affect, decreased initiation and engagement  and health management   Prior to admission, pt was requiring assist with ADLS and IADLS, did not drive  Upon evaluation: Pt presents supine and is agreeable to OTIE  Pt requires overall max A 2* the following deficits impacting occupational performance: weakness, decreased ROM, decreased strength, decreased balance, decreased tolerance, impaired arousal, impaired attention, impaired initiation, impaired sequencing, impaired problem solving, decreased safety awareness and increased pain  Pt resting in supine at end of session with all needs in reach, alarm on, all lines in place and SCD's on   Pt to benefit from continued skilled OT tx while in the hospital to address deficits as defined above and maximize level of functional independence w ADL's and functional mobility  Occupational Performance areas to address include: grooming, bathing/shower, toilet hygiene, dressing, medication management, health maintenance, functional mobility, community mobility, clothing management and social participation  The patient's raw score on the -PAC Daily Activity inpatient short form is 15  , standardized score is 34 69  , less than 39 4  Patients at this level are likely to benefit from discharge to post-acute rehabilitation services  Please refer to the recommendation of the Occupational Therapist for safe discharge planning       OT Discharge Recommendation: Post acute rehabilitation services

## 2022-11-21 NOTE — OCCUPATIONAL THERAPY NOTE
Occupational Therapy Evaluation     Patient Name: Cas Silveira  PTASHLUCIUS Date: 2022  Problem List  Principal Problem:    Leg swelling  Active Problems:    Rheumatoid arthritis ([facility] )    GERD (gastroesophageal reflux disease)    Sarcoid    Benign essential hypertension    Morbid obesity ([facility] [address] )    Paroxysmal atrial fibrillation (HCC)    Lethargy    Past Medical History  Past Medical History:   Diagnosis Date    Abnormal thyroid function test     last assessed: 2015     Arthritis     Caries     last assessed: 2016     Continuous opioid dependence ([facility] ) 2021    Edema of right lower extremity     last assessed: 2015     GERD (gastroesophageal reflux disease)     Hypertension     Medicare annual wellness visit, subsequent 2021    Positive blood culture 3/11/2022    Sarcoid      Past Surgical History  Past Surgical History:   Procedure Laterality Date     SECTION      MULTIPLE TOOTH EXTRACTIONS N/A 2016    Procedure: Surgical extraction of teeth 2, 18, 19, 30, 31; incision and drainage of left subperiosteal abscess ;  Surgeon: Ambrose Lagunas DMD;  Location: [facility];  Service:            22 [facility]   OT Last Visit   OT Visit Date 22   Note Type   Note type Evaluation   Pain Assessment   Pain Score 4   Pain Location/Orientation Orientation: Right;Location: Knee   Restrictions/Precautions   Weight Bearing Precautions Per Order No   Other Precautions Cognitive; Bed Alarm;Multiple lines; Fall Risk;Pain   Home Living   Type of [address] [place] [address] Two level;Bed/bath upstairs; Performs ADLs on one level   Bathroom Toilet Standard   Home Equipment Walker;Cane   Additional Comments Pt reports she lives in a 2 story home with 0 CHHAYA however FFOS to second floor where she is unable to state at which level she completes stairs  States she then stays on the second floor and sponge bathes only   Comepltes functional mobiltiy at RW level in the home   Prior Function Level of [place] Needs assistance with ADLs; Needs assistance with IADLS;Needs assistance with functional mobility   Lives With Daughter   Receives Help From Family   IADLs Family/Friend/Other provides transportation; Family/Friend/Other provides meals; Family/Friend/Other provides medication management   Falls in the last 6 months 1 to 4   Lifestyle   Autonomy A with ADLs/ IADLS and does not drive   Reciprocal Relationships supportive DTR   Intrinsic Gratification Enjoys watching TV, mostly sedentary   Subjective   Subjective " My knee hurts too much I need to lay down"   ADL   Where Assessed Edge of bed   Grooming Assistance 4  Minimal Assistance   UB Bathing Assistance 3  Moderate Assistance   LB Bathing Assistance 2  Maximal Assistance   UB Dressing Assistance 3  Moderate Assistance   LB Dressing Assistance 2  Maximal Assistance   Toileting Assistance  2  Maximal Assistance   Bed Mobility   Supine to Sit 2  Maximal assistance   Additional items Assist x 1   Sit to Supine 2  Maximal assistance   Additional items Assist x 1   Additional Comments Tolerates sitting EOB approx 2 mins before c/o increased pain and requests return to supine, declines standing trial at this time due to same   Transfers   Sit to Stand Unable to assess   Stand to Sit Unable to assess   Additional Comments OTR to assess   Functional Mobility   Additional Comments TANYA at this time OTR to assess   Balance   Static Sitting Fair -   Dynamic Sitting Poor +   Activity Tolerance   Activity Tolerance Patient limited by fatigue;Patient limited by pain   Medical Staff Made Aware NSG aware   Nurse Made Aware yes   RUE Assessment   RUE Assessment WFL   LUE Assessment   LUE Assessment WFL   Psychosocial   Psychosocial (WDL) X   Patient Behaviors/Mood Flat affect; Fearful   Cognition   Overall Cognitive Status Impaired   Arousal/Participation Responsive   Attention Attends with cues to redirect   Orientation Level Oriented to person;Disoriented to place; Disoriented to time;Disoriented to situation   Memory Decreased recall of recent events   Following Commands Follows one step commands with increased time or repetition   Comments Pt with increased lethargy this date, difficuilty sustaining attention to answer questions  Is a questionable historian and requires increased cues to redirct to task throughout   Assessment   Limitation Decreased ADL status; Decreased UE ROM; Decreased UE strength;Decreased Safe judgement during ADL;Decreased cognition;Decreased endurance;Decreased self-care trans;Decreased high-level ADLs   Prognosis Fair   Assessment Pt is a 74 y o  female seen for OT evaluation s/p admit to [facility] on 11/19/2022 w/ Leg swelling  Comorbidities affecting pt's functional performance at time of assessment include: RA, GERD, sarcoid, paroxysmal atrial fibrillation, lethargy  Personal factors affecting pt at time of IE include:steps to enter environment, difficulty performing ADLS, difficulty performing IADLS , limited insight into deficits, flat affect, decreased initiation and engagement  and health management   Prior to admission, pt was requiring assist with ADLS and IADLS, did not drive  Upon evaluation: Pt presents supine and is agreeable to OTIE  Pt requires overall max A 2* the following deficits impacting occupational performance: weakness, decreased ROM, decreased strength, decreased balance, decreased tolerance, impaired arousal, impaired attention, impaired initiation, impaired sequencing, impaired problem solving, decreased safety awareness and increased pain  Pt resting in supine at end of session with all needs in reach, alarm on, all lines in place and SCD's on  Pt to benefit from continued skilled OT tx while in the hospital to address deficits as defined above and maximize level of functional independence w ADL's and functional mobility   Occupational Performance areas to address include: grooming, bathing/shower, toilet hygiene, dressing, medication management, health maintenance, functional mobility, community mobility, clothing management and social participation  The patient's raw score on the AM-PAC Daily Activity inpatient short form is 15  , standardized score is 34 69  , less than 39 4  Patients at this level are likely to benefit from discharge to post-acute rehabilitation services  Please refer to the recommendation of the Occupational Therapist for safe discharge planning  Goals   Patient Goals To lay down   Long Term Goal #1 see below   Plan   Treatment Interventions ADL retraining;Functional transfer training;UE strengthening/ROM; Endurance training;Cognitive reorientation;Patient/family training; Compensatory technique education;Continued evaluation; Energy conservation; Activityengagement   Goal Expiration Date 12/05/22   OT Frequency 2-3x/wk   Recommendation   OT Discharge Recommendation Post acute rehabilitation services   [facility] Daily Activity Inpatient   Lower Body Dressing 2   Bathing 2   Toileting 2   Upper Body Dressing 2   Grooming 3   Eating 4   Daily Activity Raw Score 15   Daily Activity Standardized Score (Calc for Raw Score >=11) 34 69   AM-PAC Applied Cognition Inpatient   Following a Speech/Presentation 2   Understanding Ordinary Conversation 3   Taking Medications 2   Remembering Where Things Are Placed or Put Away 2   Remembering List of 4-5 Errands 2   Taking Care of Complicated Tasks 1   Applied Cognition Raw Score 12   Applied Cognition Standardized Score 28 82     OT goals to be addressed in the next 14 days:    Pt will complete rolling left/right in bed with S assist, as prerequisite for further engagement in ADLS     Pt will complete supine to sit transfer with S using B/L UEs to initiate bed mobility     Pt will tolerate sitting at EOB 20 minutes with S assist and stable vital signs, as prerequisite for further engagement in ADLS     Pt will complete grooming task with S assist and increased time to increase independence in functional tasks    Pt will complete UB ADLS with S and use of AD/DME as needed to increase independence in functional tasks    Pt will complete LB ADLS with Min A and use of AD/DME as needed to increase independence in functional tasks    Pt will follow 100% simple one step verbal commands and be A/Ox4 consistently t/o use of external environmental cues to increase awareness for functional tasks    Pt will be attentive 100% of the time during ongoing formal cognitive assessment to assist w/ safe D/C planning and increase safety for meaningful tasks    OTR to assess functional transfers/ mobility

## 2022-11-21 NOTE — PROGRESS NOTES
INTERNAL MEDICINE RESIDENCY PROGRESS NOTE     Name: Ravin Brown   Age & Sex: 74 y o  female   MRN: [id number]  Unit/Bed#: CW2 211-02   Encounter: [id number]      PATIENT INFORMATION     Name: Ravin Brown   Age & Sex: 74 y o  female   MRN: [id number]  Hospital Stay Days: 1    ASSESSMENT/PLAN     Principal Problem:    Leg swelling  Active Problems:    Lethargy    Rheumatoid arthritis (HCC)    GERD (gastroesophageal reflux disease)    Sarcoid    Benign essential hypertension    Morbid obesity (HCC)    Paroxysmal atrial fibrillation (HCC)      * Leg swelling  Assessment & Plan  · Bilateral lower extremity edema, BNP elevated  · Dopplers negative for DVTs  · UA negative protein  · PT eval   · Echo with G2DD  ·  Cardiology consulted, appreciate recommendations  · Currently Lasix 40 mg IV b i d  Lethargy  Assessment & Plan  Patient tired and lethargic on exam today  · Will order ABG to rule out CO2 retention  Paroxysmal atrial fibrillation (HCC)  Assessment & Plan  History of paroxysmal AFib on Eliquis and rate controlled metoprolol, will continue    Morbid obesity ([address] )  Assessment & Plan  As evidenced by BMI over 45, discussed lifestyle nutritional changes    Benign essential hypertension  Assessment & Plan  History of hypertension continue Lopressor 25 mg b i d  On diuresis will hold hydrochlorothiazide    Sarcoid  Assessment & Plan  History of sarcoid falls Rheumatology continue outpatient meds    GERD (gastroesophageal reflux disease)  Assessment & Plan  Continue Protonix    Rheumatoid arthritis [facility])  Assessment & Plan  Patient follows with Rheumatology will continue on Plaquenil and prednisone 5 mg b i d  Disposition:  Continue to diurese  PT with recommendations for post acute rehab on discharge  SUBJECTIVE     Patient seen and examined  No acute events overnight       Patient denies any fever or chills, sore throat, shortness of breath cough or wheeze, chest pain or heart palpitations, abdominal pain, nausea vomiting diarrhea or constipation, some extremity tenderness and swelling still   Feels tired today but with no cause that she can pinpoint  OBJECTIVE     Vitals:    22 2135 22 2306 22 0600 22 0843   BP: 135/63 132/63  120/56   BP Location:       Pulse: 95 89  (!) 106   Resp:  18     Temp:  99 °F (37 2 °C)     TempSrc:       SpO2: 100% 99%  95%   Weight:   127 kg (279 lb 1 6 oz)    Height:          Temperature:   Temp (24hrs), Av °F (37 2 °C), Min:99 °F (37 2 °C), Max:99 °F (37 2 °C)    Temperature: 99 °F (37 2 °C)  Intake & Output:  I/O        0701   0700  0701   0700  0701   0700    P  O  480 960 118    Total Intake(mL/kg) 480 (3 7) 960 (7 6) 118 (0 9)    Urine (mL/kg/hr) 300 700 (0 2) 500 (1 6)    Total Output 300 700 500    Net +180 +260 -382           Unmeasured Urine Occurrence  2 x         Weights:        Body mass index is 49 44 kg/m²  Weight (last 2 days)     Date/Time Weight    22 0600 127 (279 1)    22 1600 129 (284)    22 0600 129 (284 4)        Physical Exam  Vitals and nursing note reviewed  Constitutional:       General: She is not in acute distress  Appearance: Normal appearance  She is well-developed  She is not ill-appearing  HENT:      Head: Normocephalic and atraumatic  Right Ear: External ear normal       Left Ear: External ear normal       Mouth/Throat:      Mouth: Mucous membranes are moist    Eyes:      Extraocular Movements: Extraocular movements intact  Conjunctiva/sclera: Conjunctivae normal       Pupils: Pupils are equal, round, and reactive to light  Cardiovascular:      Rate and Rhythm: Normal rate and regular rhythm  Pulses: Normal pulses  Heart sounds: Normal heart sounds  No murmur heard  No friction rub  No gallop  Pulmonary:      Effort: Pulmonary effort is normal  No respiratory distress  Breath sounds: Normal breath sounds   No wheezing, rhonchi or rales  Abdominal:      General: Bowel sounds are normal  There is no distension  Palpations: Abdomen is soft  Tenderness: There is no abdominal tenderness  There is no guarding  Hernia: No hernia is present  Musculoskeletal:         General: No swelling or deformity  Cervical back: Neck supple  Right lower leg: Edema present  Left lower leg: Edema present  Comments: Bilateral 1+ pitting edema to mid shins   Skin:     General: Skin is warm and dry  Coloration: Skin is not jaundiced  Findings: No bruising, lesion or rash  Comments: Diffusely dry skin   Neurological:      General: No focal deficit present  Mental Status: She is alert  Comments: A&O 2-3  Knows self, president, year, but does not know month or day  Appears lethargic today  LABORATORY DATA     Labs: I have personally reviewed pertinent reports  Results from last 7 days   Lab Units 11/20/22  0606 11/19/22  1255   WBC Thousand/uL 6 84 10 72*   HEMOGLOBIN g/dL 11 2* 12 8   HEMATOCRIT % 36 7 42 0   PLATELETS Thousands/uL 195 230   NEUTROS PCT % 72 83*   MONOS PCT % 11 9      Results from last 7 days   Lab Units 11/21/22  0526 11/20/22  0606 11/19/22  1255   POTASSIUM mmol/L 3 7 4 3 4 7   CHLORIDE mmol/L 105 110* 107   CO2 mmol/L 29 26 28   BUN mg/dL 23 25 22   CREATININE mg/dL 1 35* 1 28 1 34*   CALCIUM mg/dL 9 1 8 9 9 7   ALK PHOS U/L  --  60 74   ALT U/L  --  28 37   AST U/L  --  26 27                            IMAGING & DIAGNOSTIC TESTING     Radiology Results: I have personally reviewed pertinent reports  XR chest 1 view portable    Result Date: 11/19/2022  Impression: Minimal CHF  Workstation performed: [id number]     XR knee 4+ views Right injury    Result Date: 11/19/2022  Impression: Osteoarthritis with no acute osseous abnormality  Workstation performed: [id number]     XR tibia fibula 2 views RIGHT    Result Date: 11/19/2022  Impression: No acute osseous abnormality  Workstation performed: [id number]     CT head without contrast    Result Date: 11/19/2022  Impression: No acute intracranial abnormality  Chronic microangiopathic changes  Air-fluid level in the right maxillary sinus, which could represent acute sinusitis (series 3 image 2 ) Workstation performed: [id number]     Other Diagnostic Testing: I have personally reviewed pertinent reports  ACTIVE MEDICATIONS     Current Facility-Administered Medications   Medication Dose Route Frequency   • albuterol (PROVENTIL HFA,VENTOLIN HFA) inhaler 2 puff  2 puff Inhalation Q6H PRN   • apixaban (ELIQUIS) tablet 5 mg  5 mg Oral BID   • gabapentin (NEURONTIN) capsule 300 mg  300 mg Oral HS   • hydroxychloroquine (PLAQUENIL) tablet 200 mg  200 mg Oral BID   • metoprolol tartrate (LOPRESSOR) tablet 25 mg  25 mg Oral Q12H FAMILIA   • pantoprazole (PROTONIX) EC tablet 40 mg  40 mg Oral Early Morning   • predniSONE tablet 5 mg  5 mg Oral BID With Meals       VTE Pharmacologic Prophylaxis: Eliquis  VTE Mechanical Prophylaxis: sequential compression device    Portions of the record may have been created with voice recognition software  Occasional wrong word or "sound a like" substitutions may have occurred due to the inherent limitations of voice recognition software    Read the chart carefully and recognize, using context, where substitutions have occurred   ==  Gato Alexis MD  [address]  Internal Medicine Residency PGY-2

## 2022-11-21 NOTE — CONSULTS
Consultation - Cardiology Team One  Marivel Parker 74 y o  female MRN: [id number]  Unit/Bed#: CW2 211-02 Encounter: [id number]    Inpatient consult to Cardiology  Consult performed by: AZIZA Babin  Consult ordered by: Deniz Jorgensen MD          Physician Requesting Consult: Deniz Jorgensen,*  Reason for Consult / Principal Problem: CHF       Assessment/ Plan       1  Acute diastolic heart failure  ProBNP 1,717  Echocardiogram reviewed showing EF 26%, grade II systolic dysfunction, LA mildly dilated and no significant valvular disease   Chest xray noted minimal CHF   Received furosemide 40 mg IV x 1 dose yesterday  Will continue to diurese  Check BMP In am    Monitor I/Os +260 ml in 24 hours   Daily weights 279 lbs (bed scale)   continu 2 gram Na diet with fluid restriction     2  Paroxysmal atrial fibrillation   On eliquis 5 mg PO BID for OAC  On metoprolol 25 mg PO BID   In NSR     3  Alerted mental status   Recommend ABG   Spoke with SLIM   Patient arousable but very lethargic    4  Hypertension   BP stable: 131/70      History of Present Illness    HPI: Marivel Parker is a 74y o  year old female who has rheumatoid arthritis, sarcoidosis, hypertension, GERD and paroxysmal atrial fibrillation  She does not follows with cardiologist               She presents to [facility] ER 11/19/2022 with complaint of lower extremity edema  Patient reports for the past couple days having lower extremity edema but denies prior to this  She is very lethargic on exam today  She is able to follow commands but difficult to obtain history  She denies cardiac complaints of chest pain or SOB  She lives at home with her daughter  Attempted to call daughter listed on contact but went to voicemail       Echocardiogram reviewed showing EF 41%, grade II systolic dysfunction, LA mildly dilated and no significant valvular disease     EKG reviewed personally:  Normal sinus rhythm   Ventricular rate 85 bpm  DE interval 200 ms QRSD 88 ms  QT interval 352 ms  QTc interval 418 ms     Telemetry reviewed personally:   No telemetry     Review of Systems   Constitutional: Positive for malaise/fatigue  Negative for chills and fever  Cardiovascular: Positive for leg swelling  Negative for chest pain, dyspnea on exertion, orthopnea and palpitations  Respiratory: Negative for shortness of breath  Musculoskeletal: Negative for falls  Gastrointestinal: Negative for bloating, nausea and vomiting  Neurological: Negative for dizziness and light-headedness  All other systems reviewed and are negative      Historical Information   Past Medical History:   Diagnosis Date   • Abnormal thyroid function test     last assessed: 2015    • Arthritis    • Caries     last assessed: 2016    • Continuous opioid dependence ([facility] [place] [address] ) 2021   • Edema of right lower extremity     last assessed: 2015    • GERD (gastroesophageal reflux disease)    • Hypertension    • Medicare annual wellness visit, subsequent 2021   • Positive blood culture 3/11/2022   • Sarcoid      Past Surgical History:   Procedure Laterality Date   •  SECTION     • MULTIPLE TOOTH EXTRACTIONS N/A 2016    Procedure: Surgical extraction of teeth 2, 18, 19, 30, 31; incision and drainage of left subperiosteal abscess ;  Surgeon: Ambrose Lagunas DMD;  Location: [facility];  Service:      Social History     Substance and Sexual Activity   Alcohol Use No     Social History     Substance and Sexual Activity   Drug Use No     Social History     Tobacco Use   Smoking Status Never   Smokeless Tobacco Never     Family History:   Family History   Problem Relation Age of Onset   • Asthma Mother    • Stroke Mother    • No Known Problems Father    • No Known Problems Sister    • No Known Problems Brother    • No Known Problems Maternal Grandmother    • No Known Problems Maternal Grandfather    • No Known Problems Paternal Grandmother    • No Known Problems Paternal Grandfather    • Diabetes Maternal Aunt    • Breast cancer Cousin    • Diabetes Cousin    • Breast cancer Other        Meds/Allergies   all current active meds have been reviewed and current meds:   Current Facility-Administered Medications   Medication Dose Route Frequency   • albuterol (PROVENTIL HFA,VENTOLIN HFA) inhaler 2 puff  2 puff Inhalation Q6H PRN   • apixaban (ELIQUIS) tablet 5 mg  5 mg Oral BID   • gabapentin (NEURONTIN) capsule 300 mg  300 mg Oral HS   • hydroxychloroquine (PLAQUENIL) tablet 200 mg  200 mg Oral BID   • metoprolol tartrate (LOPRESSOR) tablet 25 mg  25 mg Oral Q12H [address]   • pantoprazole (PROTONIX) EC tablet 40 mg  40 mg Oral Early Morning   • predniSONE tablet 5 mg  5 mg Oral BID With Meals          Allergies   Allergen Reactions   • Methotrexate Derivatives    • Amoxicillin Rash   • Ampicillin-Sulbactam Sodium Rash   • Shellfish-Derived Products - Food Allergy Itching       Objective   Vitals: Blood pressure 120/56, pulse (!) 106, temperature 99 °F (37 2 °C), resp  rate 18, height 5' 3" (1 6 m), weight 127 kg (279 lb 1 6 oz), SpO2 95 %  ,     Body mass index is 49 44 kg/m²  ,     Systolic (01BXY), JGW:564 , Min:120 , YVX:423     Diastolic (57LII), KSR:09, Min:56, Max:63            Intake/Output Summary (Last 24 hours) at 11/21/2022 1122  Last data filed at 11/21/2022 0801  Gross per 24 hour   Intake 598 ml   Output 1200 ml   Net -602 ml     Weight (last 2 days)     Date/Time Weight    11/21/22 0600 127 (279 1)    11/20/22 1600 129 (284)    11/20/22 0600 129 (284 4)        Invasive Devices     Peripheral Intravenous Line  Duration           Peripheral IV 11/19/22 Right Wrist 1 day          Drain  Duration           External Urinary Catheter -- days                  Physical Exam  Constitutional:       Comments: Lethargic but arousable    HENT:      Head: Normocephalic        Mouth/Throat:      Mouth: Mucous membranes are moist    Cardiovascular:      Rate and Rhythm: Normal rate and regular rhythm  Pulses: Normal pulses  Pulmonary:      Effort: Pulmonary effort is normal  No respiratory distress  Comments: RA  Decreased in bases  Abdominal:      General: Bowel sounds are normal       Palpations: Abdomen is soft  Musculoskeletal:         General: Swelling present  Normal range of motion  Cervical back: Neck supple  Comments: + 1 edema bilateral LE    Skin:     General: Skin is warm and dry  Capillary Refill: Capillary refill takes less than 2 seconds     Neurological:      Comments: Alert to verbal stimuli   Follows commands            LABORATORY RESULTS:      CBC with diff:   Results from last 7 days   Lab Units 11/20/22  0606 11/19/22  1255   WBC Thousand/uL 6 84 10 72*   HEMOGLOBIN g/dL 11 2* 12 8   HEMATOCRIT % 36 7 42 0   MCV fL 92 92   PLATELETS Thousands/uL 195 230   MCH pg 28 1 28 1   MCHC g/dL 30 5* 30 5*   RDW % 14 6 14 6   MPV fL 11 4 11 6   NRBC AUTO /100 WBCs 0 0       CMP:  Results from last 7 days   Lab Units 11/21/22  0526 11/20/22  0606 11/19/22  1255   POTASSIUM mmol/L 3 7 4 3 4 7   CHLORIDE mmol/L 105 110* 107   CO2 mmol/L 29 26 28   BUN mg/dL 23 25 22   CREATININE mg/dL 1 35* 1 28 1 34*   CALCIUM mg/dL 9 1 8 9 9 7   AST U/L  --  26 27   ALT U/L  --  28 37   ALK PHOS U/L  --  60 74   EGFR ml/min/1 73sq m 38 41 39       BMP:  Results from last 7 days   Lab Units 11/21/22  0526 11/20/22  0606 11/19/22  1255   POTASSIUM mmol/L 3 7 4 3 4 7   CHLORIDE mmol/L 105 110* 107   CO2 mmol/L 29 26 28   BUN mg/dL 23 25 22   CREATININE mg/dL 1 35* 1 28 1 34*   CALCIUM mg/dL 9 1 8 9 9 7          Lab Results   Component Value Date    NTBNP 1,717 (H) 11/19/2022    NTBNP 1,351 (H) 03/09/2022    NTBNP 517 (H) 01/10/2022           Lipid Profile:   Lab Results   Component Value Date    CHOL 196 10/06/2017    CHOL 177 03/23/2015     Lab Results   Component Value Date    HDL 41 (L) 03/11/2022    HDL 61 01/17/2020    HDL 58 12/18/2018     Lab Results   Component Value Date LDLCALC 66 03/11/2022    [address] 95 01/17/2020    LDLCALC 81 12/18/2018     Lab Results   Component Value Date    TRIG 81 03/11/2022    TRIG 75 01/17/2020    TRIG 68 12/18/2018         Cardiac testing:   No results found for this or any previous visit  No results found for this or any previous visit  No valid procedures specified  No results found for this or any previous visit  Imaging: I have personally reviewed pertinent reports  XR chest 1 view portable    Result Date: 11/19/2022  Narrative: CHEST INDICATION:   fall  COMPARISON:  Chest radiograph March 9, 2022 and CT chest July 10, 2022  EXAM PERFORMED/VIEWS:  XR CHEST PORTABLE FINDINGS: The heart is mildly enlarged  No focal area of consolidation  There is mild pulmonary vascular congestion and very minimal interstitial edema  There is trace fluid in the minor fissure  No sizable pleural effusions  Osseous structures appear within normal limits for patient age  Impression: Minimal CHF  Workstation performed: [id number]     XR knee 4+ views Right injury    Result Date: 11/19/2022  Narrative: RIGHT KNEE INDICATION:   injury  COMPARISON:  None VIEWS:  XR KNEE 4+ VW RIGHT INJURY FINDINGS: There is no acute fracture or dislocation  There is no joint effusion  There are marked osteoarthritic changes predominantly in the medial femorotibial joint and patellofemoral joint with extensive joint space narrowing and osteophyte formation  No lytic or blastic osseous lesion  The bones are diffusely demineralized  Soft tissues are unremarkable  Impression: Osteoarthritis with no acute osseous abnormality  Workstation performed: [id number]     XR tibia fibula 2 views RIGHT    Result Date: 11/19/2022  Narrative: RIGHT TIBIA AND FIBULA INDICATION:   injury  COMPARISON:  None VIEWS:  XR TIBIA FIBULA 2 VW RIGHT FINDINGS: There is no acute fracture or dislocation  Degenerative changes of the knee reported separately with knee images   No lytic or blastic osseous lesion  The bones are diffusely demineralized  Soft tissues are unremarkable  Impression: No acute osseous abnormality  Workstation performed: [id number]     CT head without contrast    Result Date: 11/19/2022  Narrative: CT BRAIN - WITHOUT CONTRAST INDICATION:   fall yesterday,no head strike  COMPARISON:  Brain CT from 7/10/2022  TECHNIQUE:  CT examination of the brain was performed  In addition to axial images, sagittal and coronal 2D reformatted images were created and submitted for interpretation  Radiation dose length product (DLP) for this visit:  874 97 mGy-cm   This examination, like all CT scans performed in the [facility], was performed utilizing techniques to minimize radiation dose exposure, including the use of iterative  reconstruction and automated exposure control  IMAGE QUALITY:  Diagnostic  FINDINGS: PARENCHYMA: Decreased attenuation is noted in periventricular and subcortical white matter demonstrating an appearance that is statistically most likely to represent mild microangiopathic change; this appearance is similar when compared to most recent prior examination  No CT signs of acute infarction  No intracranial mass, mass effect or midline shift  No acute parenchymal hemorrhage  VENTRICLES AND EXTRA-AXIAL SPACES:  Normal for the patient's age  VISUALIZED ORBITS AND PARANASAL SINUSES:  Air-fluid level in the right maxillary sinus, which could represent acute sinusitis (series 3 image 2 ) CALVARIUM AND EXTRACRANIAL SOFT TISSUES:  Normal      Impression: No acute intracranial abnormality  Chronic microangiopathic changes   Air-fluid level in the right maxillary sinus, which could represent acute sinusitis (series 3 image 2 ) Workstation performed: [id number]     VAS lower limb venous duplex study, complete bilateral    Result Date: 11/19/2022  Narrative:  [facility] REPORT CLINICAL: Indications: Patient presents with bilateral lower extremity pain and swelling  Operative History: No Cardiovascular Surgeries Risk Factors The patient has history of HTN  CONCLUSION:  Impression: RIGHT LOWER LIMB: No evidence of acute or chronic deep vein thrombosis  No evidence of superficial thrombophlebitis noted  Doppler evaluation shows a normal response to augmentation maneuvers  Popliteal, posterior tibial and anterior tibial arterial Doppler waveforms are triphasic  LEFT LOWER LIMB: No evidence of acute or chronic deep vein thrombosis  No evidence of superficial thrombophlebitis noted  Doppler evaluation shows a normal response to augmentation maneuvers  Popliteal, posterior tibial and anterior tibial arterial Doppler waveforms are triphasic  Technically difficult/limited study  Technical findings were given to Dr Jess Mayo  SIGNATURE: Electronically Signed by: Sami Galicia MD on 2022-11-19 09:31:48 PM    Echo complete w/ contrast if indicated    Result Date: 11/21/2022  Narrative: •  Left Ventricle: Left ventricular cavity size is normal  Wall thickness is mildly increased  The left ventricular ejection fraction is 55%  Systolic function is normal  Wall motion is normal  Diastolic function is moderately abnormal, consistent with grade II (pseudonormal) relaxation  •  Right Ventricle: Right ventricular cavity size is normal  Systolic function is normal  •  Left Atrium: The atrium is mildly dilated  •  Mitral Valve: There is trace regurgitation  •  Prior TTE study available for comparison  Prior study date: 1/10/2022  No significant changes noted compared to the prior study  Assessment  Principal Problem:    Leg swelling  Active Problems:    Rheumatoid arthritis (HCC)    GERD (gastroesophageal reflux disease)    Sarcoid    Benign essential hypertension    Morbid obesity (HCC)    Paroxysmal atrial fibrillation ([address] )                   Thank you for allowing us to participate in this patient's care  This pt will follow up with          once discharged  Counseling / Coordination of Care  Total floor / unit time spent today 45 minutes  Greater than 50% of total time was spent with the patient and / or family counseling and / or coordination of care  A description of the counseling / coordination of care: Review of history, current assessment, development of a plan  Code Status: Level 1 - Full Code    ** Please Note: Dragon 360 Dictation voice to text software may have been used in the creation of this document   **

## 2022-11-22 PROBLEM — E79.0 HYPERURICEMIA: Status: RESOLVED | Noted: 2022-11-22 | Resolved: 2022-11-22

## 2022-11-22 PROBLEM — I50.31 ACUTE DIASTOLIC HEART FAILURE (HCC): Status: ACTIVE | Noted: 2022-11-19

## 2022-11-22 PROBLEM — E79.0 HYPERURICEMIA: Status: ACTIVE | Noted: 2022-11-22

## 2022-11-22 LAB
ANION GAP SERPL CALCULATED.3IONS-SCNC: 6 MMOL/L (ref 4–13)
BASOPHILS # BLD AUTO: 0.03 THOUSANDS/ÂΜL (ref 0–0.1)
BASOPHILS NFR BLD AUTO: 0 % (ref 0–1)
BUN SERPL-MCNC: 26 MG/DL (ref 5–25)
CALCIUM SERPL-MCNC: 9.1 MG/DL (ref 8.3–10.1)
CHLORIDE SERPL-SCNC: 105 MMOL/L (ref 96–108)
CO2 SERPL-SCNC: 28 MMOL/L (ref 21–32)
CREAT SERPL-MCNC: 1.62 MG/DL (ref 0.6–1.3)
CRP SERPL QL: 107 MG/L
EOSINOPHIL # BLD AUTO: 0.22 THOUSAND/ÂΜL (ref 0–0.61)
EOSINOPHIL NFR BLD AUTO: 2 % (ref 0–6)
ERYTHROCYTE [DISTWIDTH] IN BLOOD BY AUTOMATED COUNT: 14.6 % (ref 11.6–15.1)
EST. AVERAGE GLUCOSE BLD GHB EST-MCNC: 100 MG/DL
GFR SERPL CREATININE-BSD FRML MDRD: 31 ML/MIN/1.73SQ M
GLUCOSE SERPL-MCNC: 101 MG/DL (ref 65–140)
HBA1C MFR BLD: 5.1 %
HCT VFR BLD AUTO: 37.6 % (ref 34.8–46.1)
HGB BLD-MCNC: 11.4 G/DL (ref 11.5–15.4)
IMM GRANULOCYTES # BLD AUTO: 0.06 THOUSAND/UL (ref 0–0.2)
IMM GRANULOCYTES NFR BLD AUTO: 1 % (ref 0–2)
LYMPHOCYTES # BLD AUTO: 0.95 THOUSANDS/ÂΜL (ref 0.6–4.47)
LYMPHOCYTES NFR BLD AUTO: 10 % (ref 14–44)
MCH RBC QN AUTO: 28 PG (ref 26.8–34.3)
MCHC RBC AUTO-ENTMCNC: 30.3 G/DL (ref 31.4–37.4)
MCV RBC AUTO: 92 FL (ref 82–98)
MONOCYTES # BLD AUTO: 0.94 THOUSAND/ÂΜL (ref 0.17–1.22)
MONOCYTES NFR BLD AUTO: 10 % (ref 4–12)
NEUTROPHILS # BLD AUTO: 7.74 THOUSANDS/ÂΜL (ref 1.85–7.62)
NEUTS SEG NFR BLD AUTO: 77 % (ref 43–75)
NRBC BLD AUTO-RTO: 0 /100 WBCS
PLATELET # BLD AUTO: 216 THOUSANDS/UL (ref 149–390)
PMV BLD AUTO: 11.7 FL (ref 8.9–12.7)
POTASSIUM SERPL-SCNC: 4 MMOL/L (ref 3.5–5.3)
PROCALCITONIN SERPL-MCNC: 0.2 NG/ML
RBC # BLD AUTO: 4.07 MILLION/UL (ref 3.81–5.12)
SODIUM SERPL-SCNC: 139 MMOL/L (ref 135–147)
URATE SERPL-MCNC: 14.3 MG/DL (ref 2–7.5)
WBC # BLD AUTO: 9.94 THOUSAND/UL (ref 4.31–10.16)

## 2022-11-22 RX ORDER — ACETAMINOPHEN 325 MG/1
650 TABLET ORAL EVERY 6 HOURS PRN
Status: DISCONTINUED | OUTPATIENT
Start: 2022-11-22 | End: 2022-11-25 | Stop reason: HOSPADM

## 2022-11-22 RX ADMIN — APIXABAN 5 MG: 5 TABLET, FILM COATED ORAL at 17:11

## 2022-11-22 RX ADMIN — HYDROXYCHLOROQUINE SULFATE 200 MG: 200 TABLET ORAL at 08:16

## 2022-11-22 RX ADMIN — APIXABAN 5 MG: 5 TABLET, FILM COATED ORAL at 08:16

## 2022-11-22 RX ADMIN — METOPROLOL TARTRATE 25 MG: 25 TABLET, FILM COATED ORAL at 21:23

## 2022-11-22 RX ADMIN — PANTOPRAZOLE SODIUM 40 MG: 40 TABLET, DELAYED RELEASE ORAL at 06:42

## 2022-11-22 RX ADMIN — ACETAMINOPHEN 650 MG: 325 TABLET ORAL at 01:21

## 2022-11-22 RX ADMIN — PREDNISONE 5 MG: 5 TABLET ORAL at 17:11

## 2022-11-22 RX ADMIN — METOPROLOL TARTRATE 25 MG: 25 TABLET, FILM COATED ORAL at 08:16

## 2022-11-22 RX ADMIN — NYSTATIN: 100000 POWDER TOPICAL at 17:14

## 2022-11-22 RX ADMIN — HYDROXYCHLOROQUINE SULFATE 200 MG: 200 TABLET ORAL at 17:11

## 2022-11-22 RX ADMIN — GABAPENTIN 300 MG: 300 CAPSULE ORAL at 21:23

## 2022-11-22 RX ADMIN — PREDNISONE 5 MG: 5 TABLET ORAL at 06:42

## 2022-11-22 RX ADMIN — NYSTATIN: 100000 POWDER TOPICAL at 08:16

## 2022-11-22 NOTE — PROGRESS NOTES
INTERNAL MEDICINE RESIDENCY PROGRESS NOTE     Name: Manuel Myers   Age & Sex: 74 y o  female   MRN: [id number]  Unit/Bed#: CW2 211-02   Encounter: [id number]      PATIENT INFORMATION     Name: Manuel Myers   Age & Sex: 74 y o  female   MRN: [id number]  Hospital Stay Days: 2    ASSESSMENT/PLAN     Principal Problem:    Leg swelling  Active Problems:    SIRS (systemic inflammatory response syndrome) (HCC)    Benign essential hypertension    Lethargy    Rheumatoid arthritis (HCC)    GERD (gastroesophageal reflux disease)    Sarcoid    Morbid obesity (HCC)    Paroxysmal atrial fibrillation (HCC)      * Acute diastolic heart failure (HCC)  Assessment & Plan  · Bilateral lower extremity edema, proBNP elevated  · Dopplers negative for DVTs  · UA negative protein  · PT eval   · Echo with G2DD  ·  Cardiology consulted, appreciate recommendation  · Diureses held overnight as patient's lethargy initially thought to be due to over-diuresis, patient received 500cc bolus overnight with improvement of lethargy  · Will continue diureses per cardiology recommendations  SIRS (systemic inflammatory response syndrome) ([facility] [address] )  Assessment & Plan  Patient with fever 100 9F and tachycardia this AM  Patient does not appear infectious  · Blood cultures ordered, pending  · Uric acid and CRP  · AM procal 0 2, will trend  · Will continue to monitor fever curve, CBC  · Will defer abx at this time due to patient clinically appearing well    Lethargy  Assessment & Plan  Improved from yesterday, VBG with no signs of CO2 retention  · Will continue to monitor    Benign essential hypertension  Assessment & Plan  History of hypertension continue Lopressor 25 mg b i d    On diuresis will hold hydrochlorothiazide    Paroxysmal atrial fibrillation (HCC)  Assessment & Plan  History of paroxysmal AFib on Eliquis and rate controlled metoprolol, will continue    Morbid obesity ([facility] [address] )  Assessment & Plan  As evidenced by BMI over 45, discussed lifestyle nutritional changes    Sarcoid  Assessment & Plan  History of sarcoid falls Rheumatology continue outpatient meds    GERD (gastroesophageal reflux disease)  Assessment & Plan  Continue Protonix    Rheumatoid arthritis [facility])  Assessment & Plan  Patient follows with Rheumatology will continue on Plaquenil and prednisone 5 mg b i d  Disposition: Continue inpatient care while managing heart failure  Eventual d/c to post acute rehab  SUBJECTIVE     Patient seen and examined  Received 500 cc fluid overnight and diuretics held due to over diuresis thought to be contributing to patient's lethargy  Patient mildly febrile 100 9 this morning  Occult patient's family regarding patient's lethargy  According to the daughter Clara Quintero, lethargy is happened roughly 4 times this year  Per daughter each of these prior instances were due to over-diuresis, but on chart review it looks like previous instances where attributed to 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  Patient today feels much improved, less tired than yesterday  Continues to endorse lower extremity swelling and pain but no other symptoms  Patient denies any fever or chills, sore throat, shortness of breath cough or wheeze, chest pain or heart palpitations, abdominal pain, nausea vomiting diarrhea or constipation  OBJECTIVE     Vitals:    22 2207 22 0006 22 0007 22 0600   BP: 120/57 119/57 119/57    Pulse: (!) 118 (!) 108     Resp:       Temp:  (!) 100 9 °F (38 3 °C) (!) 100 9 °F (38 3 °C)    TempSrc:       SpO2: 95% 94%     Weight:    126 kg (278 lb 7 1 oz)   Height:          Temperature:   Temp (24hrs), Av 9 °F (37 7 °C), Min:98 8 °F (37 1 °C), Max:100 9 °F (38 3 °C)    Temperature: (!) 100 9 °F (38 3 °C)  Intake & Output:  I/O        0701   0700  07 07 07 07    P  O  960 819     IV Piggyback  500     Total Intake(mL/kg) 960 (7 6) 1319 (10 5)     Urine (mL/kg/hr) 700 (0 2) 800 (0 3)     Total Output 700 800     Net +260 +519            Unmeasured Urine Occurrence 2 x          Weights:        Body mass index is 49 32 kg/m²  Weight (last 2 days)     Date/Time Weight    11/22/22 0600 126 (278 44)    11/21/22 0600 127 (279 1)    11/20/22 1600 129 (284)    11/20/22 0600 129 (284 4)        Physical Exam  Vitals and nursing note reviewed  Constitutional:       General: She is not in acute distress  Appearance: Normal appearance  She is well-developed  She is not ill-appearing  HENT:      Head: Normocephalic and atraumatic  Right Ear: External ear normal       Left Ear: External ear normal       Mouth/Throat:      Mouth: Mucous membranes are moist    Eyes:      Extraocular Movements: Extraocular movements intact  Conjunctiva/sclera: Conjunctivae normal       Pupils: Pupils are equal, round, and reactive to light  Cardiovascular:      Rate and Rhythm: Normal rate and regular rhythm  Pulses: Normal pulses  Heart sounds: Normal heart sounds  No murmur heard  No friction rub  No gallop  Pulmonary:      Effort: Pulmonary effort is normal  No respiratory distress  Breath sounds: Normal breath sounds  No wheezing, rhonchi or rales  Abdominal:      General: Bowel sounds are normal  There is no distension  Palpations: Abdomen is soft  Tenderness: There is no abdominal tenderness  There is no guarding  Hernia: No hernia is present  Musculoskeletal:         General: Tenderness present  No swelling or deformity  Cervical back: Neck supple  Right lower leg: Edema present  Left lower leg: Edema present  Comments: B/l LE 1+ pitting edema and tenderness   Skin:     General: Skin is warm and dry  Coloration: Skin is not jaundiced  Findings: Rash (diffuse erythroderma) present  No bruising or lesion  Neurological:      General: No focal deficit present        Mental Status: She is alert and oriented to person, place, and time        LABORATORY DATA     Labs: I have personally reviewed pertinent reports  Results from last 7 days   Lab Units 11/22/22  0450 11/21/22  1619 11/21/22  1432 11/20/22  0606 11/19/22  1255   WBC Thousand/uL 9 94 10 59*  --  6 84 10 72*   HEMOGLOBIN g/dL 11 4* 11 7  --  11 2* 12 8   I STAT HEMOGLOBIN g/dl  --   --  12 9  --   --    HEMATOCRIT % 37 6 39 0  --  36 7 42 0   HEMATOCRIT, ISTAT %  --   --  38  --   --    PLATELETS Thousands/uL 216 234  --  195 230   NEUTROS PCT % 77*  --   --  72 83*   MONOS PCT % 10  --   --  11 9      Results from last 7 days   Lab Units 11/22/22  0450 11/21/22  1432 11/21/22  0526 11/20/22  0606 11/19/22  1255   POTASSIUM mmol/L 4 0  --  3 7 4 3 4 7   CHLORIDE mmol/L 105  --  105 110* 107   CO2 mmol/L 28  --  29 26 28   CO2, I-STAT mmol/L  --  31  --   --   --    BUN mg/dL 26*  --  23 25 22   CREATININE mg/dL 1 62*  --  1 35* 1 28 1 34*   CALCIUM mg/dL 9 1  --  9 1 8 9 9 7   ALK PHOS U/L  --   --   --  60 74   ALT U/L  --   --   --  28 37   AST U/L  --   --   --  26 27   GLUCOSE, ISTAT mg/dl  --  123  --   --   --                             IMAGING & DIAGNOSTIC TESTING     Radiology Results: I have personally reviewed pertinent reports  XR chest 1 view portable    Result Date: 11/19/2022  Impression: Minimal CHF  Workstation performed: [id number]     XR knee 4+ views Right injury    Result Date: 11/19/2022  Impression: Osteoarthritis with no acute osseous abnormality  Workstation performed: [id number]     XR tibia fibula 2 views RIGHT    Result Date: 11/19/2022  Impression: No acute osseous abnormality  Workstation performed: [id number]     CT head without contrast    Result Date: 11/19/2022  Impression: No acute intracranial abnormality  Chronic microangiopathic changes   Air-fluid level in the right maxillary sinus, which could represent acute sinusitis (series 3 image 2 ) Workstation performed: [id number]     Other Diagnostic Testing: I have personally reviewed pertinent reports  ACTIVE MEDICATIONS     Current Facility-Administered Medications   Medication Dose Route Frequency   • acetaminophen (TYLENOL) tablet 650 mg  650 mg Oral Q6H PRN   • albuterol (PROVENTIL HFA,VENTOLIN HFA) inhaler 2 puff  2 puff Inhalation Q6H PRN   • apixaban (ELIQUIS) tablet 5 mg  5 mg Oral BID   • gabapentin (NEURONTIN) capsule 300 mg  300 mg Oral HS   • hydroxychloroquine (PLAQUENIL) tablet 200 mg  200 mg Oral BID   • metoprolol tartrate (LOPRESSOR) tablet 25 mg  25 mg Oral Q12H [facility]   • nystatin (MYCOSTATIN) powder   Topical BID   • pantoprazole (PROTONIX) EC tablet 40 mg  40 mg Oral Early Morning   • predniSONE tablet 5 mg  5 mg Oral BID With Meals   • white petrolatum-mineral oil (EUCERIN,HYDROCERIN) cream   Topical 4x Daily PRN       VTE Pharmacologic Prophylaxis: Eliquis  VTE Mechanical Prophylaxis: sequential compression device    Portions of the record may have been created with voice recognition software  Occasional wrong word or "sound a like" substitutions may have occurred due to the inherent limitations of voice recognition software    Read the chart carefully and recognize, using context, where substitutions have occurred   ==  Ericka Alexis MD  [address]  Internal Medicine Residency PGY-2

## 2022-11-22 NOTE — SPEECH THERAPY NOTE
Speech Language/Pathology Cancel Note/Screen  Order received, chart reviewed  Pt screened bedside  Pt has no c/o dysphagia, nsg reports adequate intake & no coughing or wet voicing w/ any material  No active pulmonary disease seen on xray 11/21/22  Seen early this year for dysphagia - completed VBS 1/18/22, mild dysphagia w/ mildly slow mastication but no aspiration or penetration  Diet rec'd was regular w thin  Pt sipping soda w/o difficulty  No formal assessment needed at this time  Continue diet

## 2022-11-22 NOTE — PLAN OF CARE
Problem: PHYSICAL THERAPY ADULT  Goal: Performs mobility at highest level of function for planned discharge setting  See evaluation for individualized goals  Description: Treatment/Interventions: ADL retraining, Functional transfer training, LE strengthening/ROM, Elevations, Therapeutic exercise, Endurance training, Cognitive reorientation, Patient/family training, Equipment eval/education, Bed mobility, Gait training, Compensatory technique education, Continued evaluation, Spoke to nursing, Spoke to case management, Family  Equipment Recommended: Mirna Dahl       See flowsheet documentation for full assessment, interventions and recommendations  Outcome: Progressing  Note: Prognosis: Fair  Problem List: Decreased strength, Decreased range of motion, Decreased endurance, Impaired balance, Decreased mobility, Decreased coordination, Decreased cognition, Impaired judgement, Decreased safety awareness, Impaired vision, Impaired hearing, Impaired sensation, Obesity, Decreased skin integrity, Pain  Assessment: pt seen for PT session consisting of bed mob; multiple rolling attempts for bathing and pericare 2* urinary incontinence w/ sheets soiled  supine> sit w/ maxA - sitting static / dyn reaching and therex at EOB x 31 mins  2 attempts at standing were unsuccessful w/ pt being limited by gross LE weakness; limited L ankle AROM and pain reported in B/L feet and ankles; fear of falling  Pt retured to bed w/ DEP A x2 for trunk and LE management  supine therex in bed reviewed and completed w/ active assist  PT recommending rehab on d c- pt unable to return home at [facility]  Barriers to Discharge: [address] home environment, Decreased caregiver support     PT Discharge Recommendation: Post acute rehabilitation services    See flowsheet documentation for full assessment

## 2022-11-22 NOTE — PHYSICAL THERAPY NOTE
PHYSICAL THERAPY TREATMENT  NAME:  Roshni Al  DATE: 22    AGE:   74 y o  Mrn:   [id number]  ADMIT DX:  Leg swelling [M79 89]  Swelling of lower extremity [M79 89]    Past Medical History:   Diagnosis Date    Abnormal thyroid function test     last assessed: 2015     Arthritis     Caries     last assessed: 2016     Continuous opioid dependence ([address] ) 2021    Edema of right lower extremity     last assessed: 2015     GERD (gastroesophageal reflux disease)     Hypertension     Medicare annual wellness visit, subsequent 2021    Positive blood culture 3/11/2022    Sarcoid      Past Surgical History:   Procedure Laterality Date     SECTION      MULTIPLE TOOTH EXTRACTIONS N/A 2016    Procedure: Surgical extraction of teeth 2, 18, 19, 30, 31; incision and drainage of left subperiosteal abscess ;  Surgeon: Tristan Chavez DMD;  Location: [facility];  Service:        Length Of Stay: 2     22 0948   PT Last Visit   PT Visit Date 22   Note Type   Note Type Treatment   Pain Assessment   Pain Assessment Tool 0-10   Pain Score 5   Pain Location/Orientation Orientation: Left;Orientation: Right;Location: Leg   Pain Radiating Towards ankles/ feet   Pain Onset/Description Frequency: Intermittent   Patient's Stated Pain Goal No pain   Hospital Pain Intervention(s) Elevated;Repositioned; Ambulation/increased activity   Restrictions/Precautions   Weight Bearing Precautions Per Order No   Braces or Orthoses   (none)   Other Precautions Bed Alarm;Multiple lines; Fall Risk;Pain  (obesity)   General   Additional Pertinent History Pt noted to be soiled w/ urine; + sloughing skin- PT + aide for mobilization and pericare/ rolling for bathing   Family/Caregiver Present Yes   Cognition   Arousal/Participation Cooperative   Attention Attends with cues to redirect   Orientation Level Oriented to person;Oriented to place;Oriented to situation   Memory Decreased recall of precautions;Decreased recall of recent events   Following Commands Follows one step commands without difficulty   Comments inc time for processing; cues for motivation   Bed Mobility   Rolling R 2  Maximal assistance   Additional items Assist x 1  (w/ A of another for rolling/ pericare)   Rolling L 2  Maximal assistance   Additional items Assist x 1;Assist x 2   Supine to Sit 2  Maximal assistance   Additional items Assist x 1; Increased time required;Verbal cues;LE management   Sit to Supine 2  Maximal assistance   Additional items Assist x 2; Increased time required;Verbal cues;LE management   Additional Comments pt tolerated sitting EOB x 31 min w/ S/ CGA - was able to participate in reaching+ washing + seated therex - vitals stable on RA   Transfers   Sit to Stand 1  Dependent  (attempted x 2 for stand from elevated bed surfaces w/ pt unable to clear buttocks- also limited by pain in B/L feet and ankles which she attributes to OA )   Additional items Assist x 2   Stand to Sit 1  Dependent   Additional items Assist x 2   Ambulation/Elevation   Gait pattern   (unable)   Balance   Static Sitting Fair -   Dynamic Sitting Poor +   Endurance Deficit   Endurance Deficit Yes   Activity Tolerance   Activity Tolerance Patient limited by fatigue;Patient limited by pain   Medical Staff Made Aware PCA- Sheila Clemons; DESTINI Esposito Debra   Nurse Made Aware cleared for session   Exercises   Glute Sets Supine;15 reps  (x2)   Hip Flexion Supine;10 reps;AAROM;AROM; Right;Left  (x2)   Hip Abduction Supine;10 reps;Right;AAROM;AROM; Left  (x2)   Hip Adduction Supine;10 reps;AAROM;AROM; Right;Left  (x2)   Knee AROM Long Arc Quad Sitting;Right;Left;5 reps  (x4 at EOB)   Ankle Pumps Supine;Sitting;10 reps   Balance training  sitting   Assessment   Prognosis Fair   Problem List Decreased strength;Decreased range of motion;Decreased endurance; Impaired balance;Decreased mobility; Decreased coordination;Decreased cognition; Impaired judgement;Decreased safety awareness; Impaired vision; Impaired hearing; Impaired sensation;Obesity; Decreased skin integrity;Pain   Assessment pt seen for PT session consisting of bed mob; multiple rolling attempts for bathing and pericare 2* urinary incontinence w/ sheets soiled  supine> sit w/ maxA - sitting static / dyn reaching and therex at EOB x 31 mins  2 attempts at standing were unsuccessful w/ pt being limited by gross LE weakness; limited L ankle AROM and pain reported in B/L feet and ankles; fear of falling  Pt retured to bed w/ DEP A x2 for trunk and LE management  supine therex in bed reviewed and completed w/ active assist  PT recommending rehab on d c- pt unable to return home at [facility]   Barriers to Discharge Inaccessible home environment;Decreased caregiver support   Goals   Patient Goals rest   STG Expiration Date 12/04/22   PT Treatment Day 1   Plan   Treatment/Interventions ADL retraining;Functional transfer training;LE strengthening/ROM; Elevations; Therapeutic exercise; Endurance training;Cognitive reorientation;Patient/family training;Equipment eval/education; Bed mobility;Gait training; Compensatory technique education;Continued evaluation;Spoke to nursing;Spoke to case management; Family   Progress Slow progress, decreased activity tolerance   PT Frequency 2-3x/wk   Recommendation   PT Discharge Recommendation Post acute rehabilitation services   Equipment Recommended [address] Basic Mobility Inpatient   Turning in Bed Without Bedrails 3   Lying on Back to Sitting on Edge of Flat Bed 2   Moving Bed to Chair 1   Standing Up From Chair 1   Walk in Room 1   Climb 3-5 Stairs 1   Basic Mobility Inpatient Raw Score 9   Turning Head Towards Sound 4   Follow Simple Instructions 4   Low Function Basic Mobility Raw Score 17   Low Function Basic Mobility Standardized Score 27 46   Highest Level Of Mobility   JH-HLM Goal 3: Sit at edge of bed   JH-HLM Achieved 3: Sit at edge of bed   Education   Education Provided Mobility training;Home exercise program   Patient Demonstrates acceptance/verbal understanding;Reinforcement needed   End of Consult   Patient Position at End of Consult Supine;Bed/Chair alarm activated; All needs within reach       The patient's AM-PAC Basic Mobility Inpatient Short Form Raw Score is 9  A Raw score of less than or equal to 16 suggests the patient may benefit from discharge to post-acute rehabilitation services  Please also refer to the recommendation of the Physical Therapist for safe discharge planning            Ina Boas, PT

## 2022-11-22 NOTE — PROGRESS NOTES
General Cardiology   Progress Note -  Team One   Maycol Awad 74 y o  female MRN: [id number]    Unit/Bed#: CW2 211-02 Encounter: [id number]    Assessment/ Plan:    1  Acute diastolic heart failure: felt to be mild volume overload  11/21:  ProBNP 1,717  Echocardiogram reviewed showing EF 12%, grade II systolic dysfunction, LA mildly dilated and no significant valvular disease   Chest xray noted minimal CHF   Received furosemide 40 mg IV x 1 dose yesterday  Will continue to diurese  11/22:  Has gotten IV lasix 40mg once a day x3 doses; stopped yesterday as pt has low grade temps and some mild tachycardia; was actually then given 500mL fluid bolus  Repeat Cxray yesterday showed improvement in vascular congestion compared to xray on 11/19  On exam today she has minimal LE swelling; patient and family feel it is her baseline  LS are clear and she is on RA  Additionally her Cr bumped up today from 1 35 to 1 62  She may have underlying infectious process and primary team addressing; BC pending  Continue to hold diuretics today; repeat labs in AM  Plan to start oral lasix tomorrow as long as Cr stable or improved  Will need oral diuretics on discharge; discussed this and CHF monitoring/follow up with granddaughter at bedside       2  Paroxysmal atrial fibrillation   On eliquis 5 mg PO BID for OAC  On metoprolol 25 mg PO BID   In SR on presentation and RRR on exam today  PCP has been managing; does not see cardiologist as OP     3  Alerted mental status on 11/21  Improved today  ABG noted  Had low grade fevers yesterday  Defer further workup to primary team       4  Hypertension   BP stable: 119/57      Subjective: Pt seen for follow up  Events of past 24 hours noted; she was lethargic yesterday and had ABG that did not show any hypercarbia , BC pending for low grade temps; IV lasix stopped and given 500mL fluid bolus     Dona Peace at bedside and reviewed above  Pt reports feeling well today; has some LLE pain but otherwise no other complaints  No SOB, chest pain/pressure  Patient and her granddaughter feel her LE swelling is a lot better  Review of Systems   Constitutional: Negative for decreased appetite and fever  Cardiovascular: Positive for leg swelling (improved)  Negative for chest pain, orthopnea, palpitations and syncope  Respiratory: Negative for cough and shortness of breath  Gastrointestinal: Negative for nausea and vomiting  Genitourinary: Negative for dysuria  Neurological: Negative for dizziness and light-headedness  All other systems reviewed and are negative  Objective:   Vitals: Blood pressure 119/57, pulse (!) 108, temperature (!) 100 9 °F (38 3 °C), resp  rate 18, height 5' 3" (1 6 m), weight 126 kg (278 lb 7 1 oz), SpO2 94 %  ,     Body mass index is 49 32 kg/m²  ,     Systolic (22XPZ), OCF:425 , Min:117 , CYM:354     Diastolic (36RNW), FSF:04, Min:57, Max:70      Intake/Output Summary (Last 24 hours) at 11/22/2022 1002  Last data filed at 11/22/2022 0125  Gross per 24 hour   Intake 1201 ml   Output 300 ml   Net 901 ml     Weight (last 2 days)     Date/Time Weight    11/22/22 0600 126 (278 44)    11/21/22 0600 127 (279 1)    11/20/22 1600 129 (284)    11/20/22 0600 129 (284 4)        Telemetry Review:   No telemetry    Physical Exam  Vitals reviewed  Constitutional:       General: She is not in acute distress  Appearance: Normal appearance  She is obese  Comments: Pt sitting up in bed in NAD; alert and cooperative   HENT:      Head: Normocephalic  Mouth/Throat:      Mouth: Mucous membranes are moist    Cardiovascular:      Rate and Rhythm: Normal rate and regular rhythm  Heart sounds: S1 normal and S2 normal  No murmur heard  Comments: Trace LE edema  Pulmonary:      Effort: Pulmonary effort is normal       Breath sounds: Normal breath sounds  No wheezing or rales  Comments: On RA  Skin:     General: Skin is warm        Comments: RLE abrasion noted Neurological:      Mental Status: She is alert and oriented to person, place, and time     Psychiatric:         Mood and Affect: Mood normal        LABORATORY RESULTS      CBC with diff:   Results from last 7 days   Lab Units 11/22/22  0450 11/21/22  1619 11/21/22  1432 11/20/22  0606 11/19/22  1255   WBC Thousand/uL 9 94 10 59*  --  6 84 10 72*   HEMOGLOBIN g/dL 11 4* 11 7  --  11 2* 12 8   I STAT HEMOGLOBIN g/dl  --   --  12 9  --   --    HEMATOCRIT % 37 6 39 0  --  36 7 42 0   HEMATOCRIT, ISTAT %  --   --  38  --   --    MCV fL 92 92  --  92 92   PLATELETS Thousands/uL 216 234  --  195 230   MCH pg 28 0 27 5  --  28 1 28 1   MCHC g/dL 30 3* 30 0*  --  30 5* 30 5*   RDW % 14 6 14 5  --  14 6 14 6   MPV fL 11 7 11 4  --  11 4 11 6   NRBC AUTO /100 WBCs 0  --   --  0 0     CMP:  Results from last 7 days   Lab Units 11/22/22  0450 11/21/22  1432 11/21/22  0526 11/20/22  0606 11/19/22  1255   POTASSIUM mmol/L 4 0  --  3 7 4 3 4 7   CHLORIDE mmol/L 105  --  105 110* 107   CO2 mmol/L 28  --  29 26 28   CO2, I-STAT mmol/L  --  31  --   --   --    BUN mg/dL 26*  --  23 25 22   CREATININE mg/dL 1 62*  --  1 35* 1 28 1 34*   GLUCOSE, ISTAT mg/dl  --  123  --   --   --    CALCIUM mg/dL 9 1  --  9 1 8 9 9 7   AST U/L  --   --   --  26 27   ALT U/L  --   --   --  28 37   ALK PHOS U/L  --   --   --  60 74   EGFR ml/min/1 73sq m 31  --  38 41 39     BMP:  Results from last 7 days   Lab Units 11/22/22  0450 11/21/22  1432 11/21/22  0526 11/20/22  0606 11/19/22  1255   POTASSIUM mmol/L 4 0  --  3 7 4 3 4 7   CHLORIDE mmol/L 105  --  105 110* 107   CO2 mmol/L 28  --  29 26 28   CO2, I-STAT mmol/L  --  31  --   --   --    BUN mg/dL 26*  --  23 25 22   CREATININE mg/dL 1 62*  --  1 35* 1 28 1 34*   GLUCOSE, ISTAT mg/dl  --  123  --   --   --    CALCIUM mg/dL 9 1  --  9 1 8 9 9 7     Lab Results   Component Value Date    [facility] [place] 1,717 (H) 11/19/2022    NTBN 1,351 (H) 03/09/2022    NTBN 517 (H) 01/10/2022     Lipid Profile:   Lab Results   Component Value Date    CHOL 196 10/06/2017    CHOL 177 03/23/2015     Lab Results   Component Value Date    HDL 41 (L) 03/11/2022    HDL 61 01/17/2020    HDL 58 12/18/2018     Lab Results   Component Value Date    LDLCALC 66 03/11/2022    [address] 95 01/17/2020    LDLCALC 81 12/18/2018     Lab Results   Component Value Date    TRIG 81 03/11/2022    TRIG 75 01/17/2020    TRIG 68 12/18/2018     Meds/Allergies   current meds:   Current Facility-Administered Medications   Medication Dose Route Frequency   • acetaminophen (TYLENOL) tablet 650 mg  650 mg Oral Q6H PRN   • albuterol (PROVENTIL HFA,VENTOLIN HFA) inhaler 2 puff  2 puff Inhalation Q6H PRN   • apixaban (ELIQUIS) tablet 5 mg  5 mg Oral BID   • gabapentin (NEURONTIN) capsule 300 mg  300 mg Oral HS   • hydroxychloroquine (PLAQUENIL) tablet 200 mg  200 mg Oral BID   • metoprolol tartrate (LOPRESSOR) tablet 25 mg  25 mg Oral Q12H [address]   • nystatin (MYCOSTATIN) powder   Topical BID   • pantoprazole (PROTONIX) EC tablet 40 mg  40 mg Oral Early Morning   • predniSONE tablet 5 mg  5 mg Oral BID With Meals   • white petrolatum-mineral oil (EUCERIN,HYDROCERIN) cream   Topical 4x Daily PRN     Medications Prior to Admission   Medication   • acetaminophen (TYLENOL) 325 mg tablet   • albuterol (Ventolin HFA) 90 mcg/act inhaler   • alendronate (FOSAMAX) 70 mg tablet   • apixaban (ELIQUIS) 5 mg   • cholecalciferol (VITAMIN D3) 1,000 units tablet   • Clobetasol Propionate E 0 05 % emollient cream   • COVID-19 At-Home Test KIT   • gabapentin (Neurontin) 100 mg capsule   • guaifenesin-codeine (GUAIFENESIN AC) 100-10 MG/5ML liquid   • hydrochlorothiazide (HYDRODIURIL) 12 5 mg tablet   • hydroxychloroquine (PLAQUENIL) 200 mg tablet   • ketoconazole (NIZORAL) 2 % cream   • metoprolol tartrate (LOPRESSOR) 25 mg tablet   • omeprazole (PriLOSEC) 20 mg delayed release capsule   • potassium chloride (K-DUR,KLOR-CON) 10 mEq tablet   • predniSONE 5 mg tablet   • triamcinolone (KENALOG) 0 1 % cream   • white petrolatum-mineral oil (EUCERIN,HYDROCERIN) cream     Assessment:  Principal Problem:    Leg swelling  Active Problems:    Rheumatoid arthritis (HCC)    GERD (gastroesophageal reflux disease)    Sarcoid    Benign essential hypertension    Morbid obesity (HCC)    Paroxysmal atrial fibrillation (HCC)    Lethargy    Counseling / Coordination of Care  Total floor / unit time spent today 20 minutes  Greater than 50% of total time was spent with the patient and / or family counseling and / or coordination of care  ** Please Note: Dragon 360 Dictation voice to text software may have been used in the creation of this document   **

## 2022-11-23 PROBLEM — E79.0 HYPERURICEMIA: Status: ACTIVE | Noted: 2022-11-23

## 2022-11-23 PROBLEM — R53.83 LETHARGY: Status: RESOLVED | Noted: 2022-11-21 | Resolved: 2022-11-23

## 2022-11-23 LAB
ANION GAP SERPL CALCULATED.3IONS-SCNC: 6 MMOL/L (ref 4–13)
BASOPHILS # BLD AUTO: 0.02 THOUSANDS/ÂΜL (ref 0–0.1)
BASOPHILS NFR BLD AUTO: 0 % (ref 0–1)
BUN SERPL-MCNC: 30 MG/DL (ref 5–25)
CALCIUM SERPL-MCNC: 8.8 MG/DL (ref 8.3–10.1)
CHLORIDE SERPL-SCNC: 105 MMOL/L (ref 96–108)
CO2 SERPL-SCNC: 28 MMOL/L (ref 21–32)
CREAT SERPL-MCNC: 1.54 MG/DL (ref 0.6–1.3)
EOSINOPHIL # BLD AUTO: 0.29 THOUSAND/ÂΜL (ref 0–0.61)
EOSINOPHIL NFR BLD AUTO: 3 % (ref 0–6)
ERYTHROCYTE [DISTWIDTH] IN BLOOD BY AUTOMATED COUNT: 14.4 % (ref 11.6–15.1)
GFR SERPL CREATININE-BSD FRML MDRD: 33 ML/MIN/1.73SQ M
GLUCOSE SERPL-MCNC: 84 MG/DL (ref 65–140)
HCT VFR BLD AUTO: 34 % (ref 34.8–46.1)
HGB BLD-MCNC: 10.6 G/DL (ref 11.5–15.4)
IMM GRANULOCYTES # BLD AUTO: 0.04 THOUSAND/UL (ref 0–0.2)
IMM GRANULOCYTES NFR BLD AUTO: 1 % (ref 0–2)
LYMPHOCYTES # BLD AUTO: 0.94 THOUSANDS/ÂΜL (ref 0.6–4.47)
LYMPHOCYTES NFR BLD AUTO: 11 % (ref 14–44)
MCH RBC QN AUTO: 28.2 PG (ref 26.8–34.3)
MCHC RBC AUTO-ENTMCNC: 31.2 G/DL (ref 31.4–37.4)
MCV RBC AUTO: 90 FL (ref 82–98)
MONOCYTES # BLD AUTO: 0.79 THOUSAND/ÂΜL (ref 0.17–1.22)
MONOCYTES NFR BLD AUTO: 9 % (ref 4–12)
NEUTROPHILS # BLD AUTO: 6.7 THOUSANDS/ÂΜL (ref 1.85–7.62)
NEUTS SEG NFR BLD AUTO: 76 % (ref 43–75)
NRBC BLD AUTO-RTO: 0 /100 WBCS
PLATELET # BLD AUTO: 209 THOUSANDS/UL (ref 149–390)
PMV BLD AUTO: 11.5 FL (ref 8.9–12.7)
POTASSIUM SERPL-SCNC: 3.8 MMOL/L (ref 3.5–5.3)
PROCALCITONIN SERPL-MCNC: 0.17 NG/ML
RBC # BLD AUTO: 3.76 MILLION/UL (ref 3.81–5.12)
SODIUM SERPL-SCNC: 139 MMOL/L (ref 135–147)
WBC # BLD AUTO: 8.78 THOUSAND/UL (ref 4.31–10.16)

## 2022-11-23 RX ORDER — FUROSEMIDE 40 MG/1
40 TABLET ORAL DAILY
Status: DISCONTINUED | OUTPATIENT
Start: 2022-11-23 | End: 2022-11-25 | Stop reason: HOSPADM

## 2022-11-23 RX ORDER — CEFAZOLIN SODIUM 1 G/50ML
1000 SOLUTION INTRAVENOUS EVERY 8 HOURS
Status: DISCONTINUED | OUTPATIENT
Start: 2022-11-23 | End: 2022-11-25

## 2022-11-23 RX ORDER — AMOXICILLIN 250 MG
2 CAPSULE ORAL 2 TIMES DAILY
Status: DISCONTINUED | OUTPATIENT
Start: 2022-11-23 | End: 2022-11-25 | Stop reason: HOSPADM

## 2022-11-23 RX ORDER — POLYETHYLENE GLYCOL 3350 17 G/17G
17 POWDER, FOR SOLUTION ORAL DAILY
Status: DISCONTINUED | OUTPATIENT
Start: 2022-11-23 | End: 2022-11-25 | Stop reason: HOSPADM

## 2022-11-23 RX ORDER — BISACODYL 10 MG
10 SUPPOSITORY, RECTAL RECTAL ONCE
Status: DISCONTINUED | OUTPATIENT
Start: 2022-11-23 | End: 2022-11-25 | Stop reason: HOSPADM

## 2022-11-23 RX ADMIN — HYDROCORTISONE: 25 CREAM TOPICAL at 17:14

## 2022-11-23 RX ADMIN — ACETAMINOPHEN 650 MG: 325 TABLET ORAL at 17:15

## 2022-11-23 RX ADMIN — HYDROCORTISONE: 25 CREAM TOPICAL at 22:23

## 2022-11-23 RX ADMIN — APIXABAN 5 MG: 5 TABLET, FILM COATED ORAL at 08:58

## 2022-11-23 RX ADMIN — HYDROCORTISONE: 25 CREAM TOPICAL at 12:31

## 2022-11-23 RX ADMIN — VANCOMYCIN HYDROCHLORIDE 1500 MG: 1 INJECTION, POWDER, LYOPHILIZED, FOR SOLUTION INTRAVENOUS at 14:58

## 2022-11-23 RX ADMIN — CEFAZOLIN SODIUM 1000 MG: 1 SOLUTION INTRAVENOUS at 17:13

## 2022-11-23 RX ADMIN — FUROSEMIDE 40 MG: 40 TABLET ORAL at 12:31

## 2022-11-23 RX ADMIN — NYSTATIN: 100000 POWDER TOPICAL at 09:01

## 2022-11-23 RX ADMIN — PREDNISONE 5 MG: 5 TABLET ORAL at 17:13

## 2022-11-23 RX ADMIN — HYDROXYCHLOROQUINE SULFATE 200 MG: 200 TABLET ORAL at 17:14

## 2022-11-23 RX ADMIN — METOPROLOL TARTRATE 25 MG: 25 TABLET, FILM COATED ORAL at 08:57

## 2022-11-23 RX ADMIN — PANTOPRAZOLE SODIUM 40 MG: 40 TABLET, DELAYED RELEASE ORAL at 06:33

## 2022-11-23 RX ADMIN — HYDROXYCHLOROQUINE SULFATE 200 MG: 200 TABLET ORAL at 09:01

## 2022-11-23 RX ADMIN — PREDNISONE 5 MG: 5 TABLET ORAL at 08:58

## 2022-11-23 RX ADMIN — NYSTATIN: 100000 POWDER TOPICAL at 17:14

## 2022-11-23 RX ADMIN — GABAPENTIN 300 MG: 300 CAPSULE ORAL at 22:23

## 2022-11-23 RX ADMIN — METOPROLOL TARTRATE 25 MG: 25 TABLET, FILM COATED ORAL at 22:23

## 2022-11-23 RX ADMIN — APIXABAN 5 MG: 5 TABLET, FILM COATED ORAL at 17:13

## 2022-11-23 NOTE — PROGRESS NOTES
INTERNAL MEDICINE RESIDENCY PROGRESS NOTE     Name: Jagruti Sandoval   Age & Sex: 74 y o  female   MRN: [id number]  Unit/Bed#: CW2 211-02   Encounter: [id number]  Team: SLIM    PATIENT INFORMATION     Name: Jagruti Sandoval   Age & Sex: 74 y o  female   MRN: [id number]  Hospital Stay Days: 3    ASSESSMENT/PLAN     Principal Problem:    Acute diastolic heart failure ([facility] )  Active Problems:    SIRS (systemic inflammatory response syndrome) ([place] [address] )    Benign essential hypertension    Lethargy    Rheumatoid arthritis ([facility])    GERD (gastroesophageal reflux disease)    Sarcoid    Morbid obesity ([place] [address] )    Paroxysmal atrial fibrillation ([facility])      * Acute diastolic heart failure ([facility])  Assessment & Plan  · Bilateral lower extremity edema, proBNP elevated  · Dopplers negative for DVTs  · UA negative protein  · PT eval   · Echo with G2DD  ·  Cardiology consulted, appreciate recommendation  · Diuretics continued, currently 40mg lasix PO daily per cards recs      Blood bacterial culture positive  Assessment & Plan  1/2 blood cultures positive for gram positive cocci in clusters, skin transmission likely source given patient's erythroderma  Plan:  · Will give renally dosed vancomycin now  · ID consult, appreciate recommendations  · Daily CBC and fever curve      SIRS (systemic inflammatory response syndrome) ([facility])  Assessment & Plan  Tmax 100 4F, tachycardia resolving  · Blood cultures 1/2 positive for gram positive cocci in clusters; possibly via skin transmission given patient's erythroderma  · ID consult and renally dosed vancomycin  · F/u sensitivities   · Uric acid and CRP elevated  · AM procal 0 2 / 0 17  · Swallow study negative  · CXR with no signs of PNA   · Will continue to monitor fever curve, CBC      Benign essential hypertension  Assessment & Plan  History of hypertension continue Lopressor 25 mg b i d    On diuresis will hold hydrochlorothiazide    Erythroderma  Assessment & Plan  · Eucerin cream  · will start hydrocortisone 2 5%  · Outpatient derm followup     Lethargy-resolved as of 2022  Assessment & Plan  VBG with no signs of CO2 retention  Resolved  · Will continue to monitor    Hyperuricemia  Assessment & Plan  Uric acid of 14 3, likely in the setting of recent diuresis  Patient with lower extremity tenderness but no signs of gout  · Will defer medical treatment at this time  · Continue to monitor for signs     Paroxysmal atrial fibrillation (HCC)  Assessment & Plan  History of paroxysmal AFib on Eliquis and rate controlled metoprolol, will continue    Morbid obesity ([address] )  Assessment & Plan  As evidenced by BMI over 45, discussed lifestyle nutritional changes    Sarcoid  Assessment & Plan  History of sarcoid falls Rheumatology continue outpatient meds    GERD (gastroesophageal reflux disease)  Assessment & Plan  Continue Protonix    Rheumatoid arthritis [facility])  Assessment & Plan  Patient follows with Rheumatology will continue on Plaquenil and prednisone 5 mg b i d  Disposition: Starting oral diuretics per Cardiology recs  Likely medically stable for discharge in the next 24 to 48 hours to post acute rehab  SUBJECTIVE     Patient seen and examined  No acute events overnight  Tmax of 100 4F  Patient states she is feeling well  Still endorses or lower extremity swelling and tenderness  Endorses constipation today  Patient denies any fever or chills, sore throat, shortness of breath cough or wheeze, chest pain or heart palpitations, abdominal pain, nausea vomiting, extremity pain or swelling       OBJECTIVE     Vitals:    22 2317 22 0049 22 0600 22 0732   BP: 106/51   107/50   BP Location:       Pulse:    91   Resp:       Temp: 100 4 °F (38 °C) 98 4 °F (36 9 °C)  99 °F (37 2 °C)   TempSrc:       SpO2:    99%   Weight:   124 kg (273 lb 3 2 oz)    Height:          Temperature:   Temp (24hrs), Av 3 °F (37 4 °C), Min:98 4 °F (36 9 °C), Max:100 4 °F (38 °C)    Temperature: 99 °F (37 2 °C)  Intake & Output:  I/O       11/21 0701  11/22 0700 11/22 0701 11/23 0700 11/23 0701 11/24 0700    P  O  939 1020     IV Piggyback 500      Total Intake(mL/kg) 1439 (11 4) 1020 (8 2)     Urine (mL/kg/hr) 800 (0 3) 1200 (0 4)     Total Output 800 1200     Net +639 -180            Unmeasured Urine Occurrence  1 x         Weights:        Body mass index is 48 4 kg/m²  Weight (last 2 days)     Date/Time Weight    11/23/22 0600 124 (273 2)    11/22/22 0600 126 (278 44)    11/21/22 0600 127 (279 1)        Physical Exam  Vitals and nursing note reviewed  Constitutional:       General: She is not in acute distress  Appearance: She is well-developed  HENT:      Head: Normocephalic and atraumatic  Eyes:      Conjunctiva/sclera: Conjunctivae normal    Cardiovascular:      Rate and Rhythm: Normal rate and regular rhythm  Heart sounds: No murmur heard  Pulmonary:      Effort: Pulmonary effort is normal  No respiratory distress  Breath sounds: Normal breath sounds  No wheezing or rales  Abdominal:      Palpations: Abdomen is soft  Tenderness: There is no abdominal tenderness  Musculoskeletal:         General: Tenderness present  No swelling or deformity  Cervical back: Neck supple  Right lower leg: Edema present  Left lower leg: Edema present  Comments: 1+ B/l lower extremity dependent pitting edema with associated tenderness, improving  Skin:     General: Skin is warm and dry  Capillary Refill: Capillary refill takes less than 2 seconds  Coloration: Skin is not jaundiced  Findings: Rash (erythroderma) present  No bruising or lesion  Neurological:      General: No focal deficit present  Mental Status: She is alert and oriented to person, place, and time  Psychiatric:         Mood and Affect: Mood normal        LABORATORY DATA     Labs: I have personally reviewed pertinent reports    Results from last 7 days   Lab Units 11/23/22  5096 11/22/22  0450 11/21/22  1619 11/21/22  1432 11/20/22  0606   WBC Thousand/uL 8 78 9 94 10 59*  --  6 84   HEMOGLOBIN g/dL 10 6* 11 4* 11 7  --  11 2*   I STAT HEMOGLOBIN   --   --   --    < >  --    HEMATOCRIT % 34 0* 37 6 39 0  --  36 7   HEMATOCRIT, ISTAT   --   --   --    < >  --    PLATELETS Thousands/uL 209 216 234  --  195   NEUTROS PCT % 76* 77*  --   --  72   MONOS PCT % 9 10  --   --  11    < > = values in this interval not displayed  Results from last 7 days   Lab Units 11/23/22  0515 11/22/22  0450 11/21/22  1432 11/21/22  0526 11/20/22  0606 11/19/22  1255   POTASSIUM mmol/L 3 8 4 0  --  3 7 4 3 4 7   CHLORIDE mmol/L 105 105  --  105 110* 107   CO2 mmol/L 28 28  --  29 26 28   CO2, I-STAT mmol/L  --   --  31  --   --   --    BUN mg/dL 30* 26*  --  23 25 22   CREATININE mg/dL 1 54* 1 62*  --  1 35* 1 28 1 34*   CALCIUM mg/dL 8 8 9 1  --  9 1 8 9 9 7   ALK PHOS U/L  --   --   --   --  60 74   ALT U/L  --   --   --   --  28 37   AST U/L  --   --   --   --  26 27   GLUCOSE, ISTAT mg/dl  --   --  123  --   --   --                             IMAGING & DIAGNOSTIC TESTING     Radiology Results: I have personally reviewed pertinent reports  XR chest portable    Result Date: 11/22/2022  Impression: No active pulmonary disease on examination which is somewhat limited secondary to low lung volumes  Workstation performed: [id number]     XR chest 1 view portable    Result Date: 11/19/2022  Impression: Minimal CHF  Workstation performed: [id number]     XR knee 4+ views Right injury    Result Date: 11/19/2022  Impression: Osteoarthritis with no acute osseous abnormality  Workstation performed: [id number]     XR tibia fibula 2 views RIGHT    Result Date: 11/19/2022  Impression: No acute osseous abnormality  Workstation performed: [id number]     CT head without contrast    Result Date: 11/19/2022  Impression: No acute intracranial abnormality  Chronic microangiopathic changes   Air-fluid level in the right maxillary sinus, which could represent acute sinusitis (series 3 image 2 ) Workstation performed: [id number]     Other Diagnostic Testing: I have personally reviewed pertinent reports  ACTIVE MEDICATIONS     Current Facility-Administered Medications   Medication Dose Route Frequency   • acetaminophen (TYLENOL) tablet 650 mg  650 mg Oral Q6H PRN   • albuterol (PROVENTIL HFA,VENTOLIN HFA) inhaler 2 puff  2 puff Inhalation Q6H PRN   • apixaban (ELIQUIS) tablet 5 mg  5 mg Oral BID   • bisacodyl (DULCOLAX) rectal suppository 10 mg  10 mg Rectal Once   • furosemide (LASIX) tablet 40 mg  40 mg Oral Daily   • gabapentin (NEURONTIN) capsule 300 mg  300 mg Oral HS   • hydrocortisone 2 5 % cream   Topical TID   • hydroxychloroquine (PLAQUENIL) tablet 200 mg  200 mg Oral BID   • metoprolol tartrate (LOPRESSOR) tablet 25 mg  25 mg Oral Q12H FAMILIA   • nystatin (MYCOSTATIN) powder   Topical BID   • pantoprazole (PROTONIX) EC tablet 40 mg  40 mg Oral Early Morning   • polyethylene glycol (MIRALAX) packet 17 g  17 g Oral Daily   • predniSONE tablet 5 mg  5 mg Oral BID With Meals   • senna-docusate sodium (SENOKOT S) 8 6-50 mg per tablet 2 tablet  2 tablet Oral BID   • white petrolatum-mineral oil (EUCERIN,HYDROCERIN) cream   Topical 4x Daily PRN       VTE Pharmacologic Prophylaxis: Eliquis  VTE Mechanical Prophylaxis: sequential compression device    Portions of the record may have been created with voice recognition software  Occasional wrong word or "sound a like" substitutions may have occurred due to the inherent limitations of voice recognition software    Read the chart carefully and recognize, using context, where substitutions have occurred   ==  Barb Alexis MD  [facility]  Internal Medicine Residency PGY-2

## 2022-11-23 NOTE — OCCUPATIONAL THERAPY NOTE
Occupational Therapy Progress Note     Patient Name: Dewayne Landon  DQFIT'U Date: 11/23/2022  Problem List  Principal Problem:    Acute diastolic heart failure ([address] )  Active Problems:    SIRS (systemic inflammatory response syndrome) (HCC)    Rheumatoid arthritis (HCC)    GERD (gastroesophageal reflux disease)    Sarcoid    Benign essential hypertension    Morbid obesity (HCC)    Erythroderma    Paroxysmal atrial fibrillation (HCC)    Blood bacterial culture positive    Hyperuricemia            11/23/22 1420   OT Last Visit   OT Visit Date 11/23/22   Note Type   Note Type Treatment   Pain Assessment   Pain Score 3   Pain Location/Orientation Orientation: Bilateral;Location: Foot   Restrictions/Precautions   Weight Bearing Precautions Per Order No   Other Precautions Bed Alarm;Cognitive; Fall Risk;Pain   ADL   Where Assessed Edge of bed   Grooming Assistance 3  Moderate Assistance   UB Bathing Assistance 3  Moderate Assistance   LB Bathing Assistance 2  Maximal Assistance   UB Dressing Assistance 3  Moderate Assistance   LB Dressing Assistance 2  Maximal Assistance   Toileting Assistance  2  Maximal Assistance   Functional Standing Tolerance   Time ~5 seconds   Activity static standing   Comments max A x2   Bed Mobility   Rolling R 2  Maximal assistance   Additional items Assist x 1   Rolling L 2  Maximal assistance   Additional items Assist x 1   Supine to Sit 2  Maximal assistance   Additional items Assist x 2   Sit to Supine 2  Maximal assistance   Additional items Assist x 2   Additional Comments Sat EOB approx 15 mins with overall min- mod A to reamin upright   Transfers   Sit to Stand 2  Maximal assistance   Additional items Assist x 2   Stand to Sit 2  Maximal assistance   Additional items Assist x 2   Additional Comments Increased emotional support required as pt with a significant fear of falling and is initally untrusting of staff to assist with attempting sit to stand - significant time spent building a trusting theraputic relationship and providing emotional support and coping stratigies   Functional Mobility   Additional Comments not appropriate at this time will continue to assess as able with continues pt progress   Subjective   Subjective "I'm so scared I'm going to fall on the floor"   Cognition   Overall Cognitive Status Impaired   Arousal/Participation Cooperative   Attention Attends with cues to redirect   Orientation Level Oriented X4   Memory Decreased recall of precautions;Decreased recall of recent events   Following Commands Follows one step commands without difficulty   Comments Overall pleasant and cooperative, increased emotional support required this date   Activity Tolerance   Activity Tolerance Patient limited by fatigue;Patient limited by pain   Medical Staff Made Aware NSG aware   Assessment   Assessment Pt was seen this date for OT tx session focusing on self care tasks, bed mobility, sit to stand progressions, standing tolerance, tranfers, safety awareness, compensatory techniques, energy conservation, and overall activity tolerance  Pt presents supine and is agreeable to OT tx session  Pt completes previously mentioned tasks at documented assist levels please see above in flow sheet  Pt was able to stand this date clearing the bed with max A x 2 and and significant  encouragement as pt is afraid of falling  Continues to require overall mod- max A for self cares  Pt is overall limited by decreased balance, fear of falling, increased fatigue, decreased strength, endurance, and activity tolerance and continues to function below baseline level  Pt resting in supine at end of session with all needs in reach and alarm on  Pt would benefit from continued OT Tx to improve overall functional abilities and increase independence  Will continue to follow with current POC  Plan   Treatment Interventions ADL retraining;Functional transfer training;UE strengthening/ROM; Endurance training;Cognitive reorientation;Patient/family training; Compensatory technique education;Continued evaluation; Energy conservation; Activityengagement   Goal Expiration Date 12/05/22   OT Treatment Day 1   OT Frequency 2-3x/wk   Recommendation   OT Discharge Recommendation Post acute rehabilitation services   AM-PAC Daily Activity Inpatient   Lower Body Dressing 2   Bathing 2   Toileting 2   Upper Body Dressing 2   Grooming 3   Eating 4   Daily Activity Raw Score 15   Daily Activity Standardized Score (Calc for Raw Score >=11) 34 69   AM-PAC Applied Cognition Inpatient   Following a Speech/Presentation 3   Understanding Ordinary Conversation 4   Taking Medications 3   Remembering Where Things Are Placed or Put Away 3   Remembering List of 4-5 Errands 3   Taking Care of Complicated Tasks 3   Applied Cognition Raw Score 19   Applied Cognition Standardized Score 39 77     Additional goal to be added to POC:    Pt will complete functional transfers at min A level with appropriate DME PRN       OTR will assess functional mobility when appropriate

## 2022-11-23 NOTE — ASSESSMENT & PLAN NOTE
One of 2 blood cultures positive coagulase negative Staphylococcus  Likely contaminant  Monitor closely  Infectious Disease input noted

## 2022-11-23 NOTE — CASE MANAGEMENT
Case Management Discharge Planning Note    Patient name Blane Robledo  Location CW2 376/RB5 211-02 MRN [id number]  : 1950 Date 2022       Current Admission Date: 2022  Current Admission Diagnosis:Acute diastolic heart failure [facility])   Patient Active Problem List    Diagnosis Date Noted   • Lethargy 2022   • Acute diastolic heart failure ([facility] ) 2022   • Acute bronchitis 2022   • Assistance needed with transportation 09/15/2022   • Abnormal CT of the chest 2022   • Pustular psoriasis 2022   • Elevated troponin 2022   • COVID-19 2022   • Paroxysmal atrial fibrillation ([facility] ) 01/10/2022   • Hyperparathyroidism ([facility] ) 2021   • Murmur, cardiac 2021   • BPPV (benign paroxysmal positional vertigo) 2018   • Seasonal allergic rhinitis due to pollen 2018   • Erythroderma 10/27/2017   • Osteoporosis 2016   • SIRS (systemic inflammatory response syndrome) ([facility] ) 2016   • Rheumatoid arthritis ([facility] ) 2016   • GERD (gastroesophageal reflux disease) 2016   • Sarcoid 2016   • Morbid obesity ([facility] ) 2016   • Vitamin D deficiency 2015   • Benign essential hypertension 2014   • Lumbar radiculopathy 2014      LOS (days): 3  Geometric Mean LOS (GMLOS) (days): 3 90  Days to GMLOS:1     OBJECTIVE:  Risk of Unplanned Readmission Score: 22 32         Current admission status: Inpatient   Preferred Pharmacy:   [address] [place] [address] [id number] [facility], [address] [place] [address] Road  [address] [facility] [address]  Phone: [phone] Fax: [phone] [address] [place] [address]  Phone: [phone] Fax: [phone]    Primary Care Provider: Thu Fuller DO    Primary Insurance: [employer] [facility] RE[facility]  Secondary Insurance:     DISCHARGE DETAILS:    Discharge planning discussed with[de-identified] daughter Contacts  Patient Contacts: destiny Catalan  Relationship to Patient[de-identified] Family  Contact Method: Phone  Phone Number: [phone]  Reason/Outcome: Emergency Contact              Other Referral/Resources/Interventions Provided:  Interventions: Short Term Rehab    IMM Given (Date)::  (9am)  IMM Given to[de-identified]  (discussed with dtr by phone at [address] and placed at bedside)     Additional Comments: Daughter Miguel A Bradford was contacted and was made aware [facility] accepted  [facility] has not made a determination yet via Aidin  [address] is the 1st choice so am essage was left voa Aidin and a VM was left in admissions at the [facility]  Miguel A Bradford says if [facility] declines then [facility] is preference  Awaiting determination for further DC needs  3:40pm  [facility] does not have bed availability  Patient and daughter were made aware and in agreement to [employer] when medically ready  [facility] liaison Floyd Burch notified CM that [facility] was obtained and patient can be transferred when ready  Hospital Dr Alexis was TT and said patient needs clearance from ID before DC    CM to be available no

## 2022-11-23 NOTE — PROGRESS NOTES
General Cardiology   Progress Note -  [facility]   Ivan Ritchie 74 y o  female MRN: [id number]    Unit/Bed#: CW2 211-02 Encounter: [id number]    Assessment/ Plan    1  Acute diastolic heart failure  ProBNP 1,717 on admission   Echocardiogram reviewed showing EF 24%, grade II systolic dysfunction, LA mildly dilated and no significant valvular disease F   Received furosemide 40 mg IV x 3 doses this admission  Creatinine increased therefore held diuretics yesterday  Will start oral diuretics today  Monitor I/Os -180 ml ml in 24 hours   Daily weights 273 lbs (bed scale)   continu 2 gram Na diet with fluid restriction      2  Paroxysmal atrial fibrillation   On eliquis 5 mg PO BID for OAC  On metoprolol 25 mg PO BID      3  Hypertension   BP stable: 131/70    Subjective  Patient resting in bed  She offers no complaints today beside lower extremity pain  No chest pain, SOB or palpitations  No fever or chills  Review of Systems   Constitutional: Negative for chills and fever  HENT: Negative for congestion  Cardiovascular: Positive for leg swelling  Negative for chest pain, dyspnea on exertion, orthopnea and palpitations  Respiratory: Negative for shortness of breath  Musculoskeletal: Negative for falls  Gastrointestinal: Negative for bloating, nausea and vomiting  Neurological: Negative for dizziness and light-headedness  Psychiatric/Behavioral: Negative for altered mental status  All other systems reviewed and are negative  Objective:   Vitals: Blood pressure 107/50, pulse 91, temperature 99 °F (37 2 °C), resp  rate 18, height 5' 3" (1 6 m), weight 124 kg (273 lb 3 2 oz), SpO2 99 %  ,       Body mass index is 48 4 kg/m²  ,     Systolic (60OJX), JUSTIN:003 , Min:106 , VOP:735     Diastolic (67ZQA), LQR:32, Min:50, Max:52      Intake/Output Summary (Last 24 hours) at 11/23/2022 0900  Last data filed at 11/23/2022 0092  Gross per 24 hour   Intake 1020 ml   Output 1200 ml   Net -180 ml     Weight (last 2 days)     Date/Time Weight    11/23/22 0600 124 (273 2)    11/22/22 0600 126 (278 44)    11/21/22 0600 127 (279 1)        Telemetry Review: No telemetry     Physical Exam  Constitutional:       General: She is not in acute distress  HENT:      Head: Normocephalic  Mouth/Throat:      Mouth: Mucous membranes are moist    Cardiovascular:      Rate and Rhythm: Normal rate and regular rhythm  Pulses: Normal pulses  Pulmonary:      Effort: Pulmonary effort is normal  No respiratory distress  Breath sounds: Normal breath sounds  Abdominal:      General: Bowel sounds are normal       Palpations: Abdomen is soft  Musculoskeletal:         General: Swelling present  Normal range of motion  Cervical back: Neck supple  Comments: Trace lower extremity edema    Skin:     General: Skin is warm and dry  Capillary Refill: Capillary refill takes less than 2 seconds  Neurological:      Mental Status: She is alert and oriented to person, place, and time     Psychiatric:         Mood and Affect: Mood normal          LABORATORY RESULTS      CBC with diff:   Results from last 7 days   Lab Units 11/23/22  0515 11/22/22  0450 11/21/22  1619 11/21/22  1432 11/20/22  0606 11/19/22  1255   WBC Thousand/uL 8 78 9 94 10 59*  --  6 84 10 72*   HEMOGLOBIN g/dL 10 6* 11 4* 11 7  --  11 2* 12 8   I STAT HEMOGLOBIN g/dl  --   --   --  12 9  --   --    HEMATOCRIT % 34 0* 37 6 39 0  --  36 7 42 0   HEMATOCRIT, ISTAT %  --   --   --  38  --   --    MCV fL 90 92 92  --  92 92   PLATELETS Thousands/uL 209 216 234  --  195 230   MCH pg 28 2 28 0 27 5  --  28 1 28 1   MCHC g/dL 31 2* 30 3* 30 0*  --  30 5* 30 5*   RDW % 14 4 14 6 14 5  --  14 6 14 6   MPV fL 11 5 11 7 11 4  --  11 4 11 6   NRBC AUTO /100 WBCs 0 0  --   --  0 0       CMP:  Results from last 7 days   Lab Units 11/23/22  0515 11/22/22  0450 11/21/22  1432 11/21/22  0526 11/20/22  0606 11/19/22  1255   POTASSIUM mmol/L 3 8 4 0  --  3 7 4 3 4 7   CHLORIDE mmol/L 105 105  --  105 110* 107   CO2 mmol/L 28 28  --  29 26 28   CO2, I-STAT mmol/L  --   --  31  --   --   --    BUN mg/dL 30* 26*  --  23 25 22   CREATININE mg/dL 1 54* 1 62*  --  1 35* 1 28 1 34*   GLUCOSE, ISTAT mg/dl  --   --  123  --   --   --    CALCIUM mg/dL 8 8 9 1  --  9 1 8 9 9 7   AST U/L  --   --   --   --  26 27   ALT U/L  --   --   --   --  28 37   ALK PHOS U/L  --   --   --   --  60 74   EGFR ml/min/1 73sq m 33 31  --  38 41 39       BMP:  Results from last 7 days   Lab Units 11/23/22  0515 11/22/22  0450 11/21/22  1432 11/21/22  0526 11/20/22  0606 11/19/22  1255   POTASSIUM mmol/L 3 8 4 0  --  3 7 4 3 4 7   CHLORIDE mmol/L 105 105  --  105 110* 107   CO2 mmol/L 28 28  --  29 26 28   CO2, I-STAT mmol/L  --   --  31  --   --   --    BUN mg/dL 30* 26*  --  23 25 22   CREATININE mg/dL 1 54* 1 62*  --  1 35* 1 28 1 34*   GLUCOSE, ISTAT mg/dl  --   --  123  --   --   --    CALCIUM mg/dL 8 8 9 1  --  9 1 8 9 9 7       Lab Results   Component Value Date    NTBNP 1,717 (H) 11/19/2022    NTBNP 1,351 (H) 03/09/2022    NTBNP 517 (H) 01/10/2022        Results from last 7 days   Lab Units 11/22/22  0450   HEMOGLOBIN A1C % 5 1     Lipid Profile:   Lab Results   Component Value Date    CHOL 196 10/06/2017    CHOL 177 03/23/2015     Lab Results   Component Value Date    HDL 41 (L) 03/11/2022    HDL 61 01/17/2020    HDL 58 12/18/2018     Lab Results   Component Value Date    LDLCALC 66 03/11/2022    [address] 95 01/17/2020    LDLCALC 81 12/18/2018     Lab Results   Component Value Date    TRIG 81 03/11/2022    TRIG 75 01/17/2020    TRIG 68 12/18/2018       Cardiac testing:   No results found for this or any previous visit  No results found for this or any previous visit  No results found for this or any previous visit  No valid procedures specified  No results found for this or any previous visit      Meds/Allergies   all current active meds have been reviewed and current meds:   Current Facility-Administered Medications   Medication Dose Route Frequency   • acetaminophen (TYLENOL) tablet 650 mg  650 mg Oral Q6H PRN   • albuterol (PROVENTIL HFA,VENTOLIN HFA) inhaler 2 puff  2 puff Inhalation Q6H PRN   • apixaban (ELIQUIS) tablet 5 mg  5 mg Oral BID   • gabapentin (NEURONTIN) capsule 300 mg  300 mg Oral HS   • hydroxychloroquine (PLAQUENIL) tablet 200 mg  200 mg Oral BID   • metoprolol tartrate (LOPRESSOR) tablet 25 mg  25 mg Oral Q12H [address]   • nystatin (MYCOSTATIN) powder   Topical BID   • pantoprazole (PROTONIX) EC tablet 40 mg  40 mg Oral Early Morning   • predniSONE tablet 5 mg  5 mg Oral BID With Meals   • white petrolatum-mineral oil (EUCERIN,HYDROCERIN) cream   Topical 4x Daily PRN     Medications Prior to Admission   Medication   • acetaminophen (TYLENOL) 325 mg tablet   • albuterol (Ventolin HFA) 90 mcg/act inhaler   • alendronate (FOSAMAX) 70 mg tablet   • apixaban (ELIQUIS) 5 mg   • cholecalciferol (VITAMIN D3) 1,000 units tablet   • Clobetasol Propionate E 0 05 % emollient cream   • COVID-19 At-Home Test KIT   • gabapentin (Neurontin) 100 mg capsule   • guaifenesin-codeine (GUAIFENESIN AC) 100-10 MG/5ML liquid   • hydrochlorothiazide (HYDRODIURIL) 12 5 mg tablet   • hydroxychloroquine (PLAQUENIL) 200 mg tablet   • ketoconazole (NIZORAL) 2 % cream   • metoprolol tartrate (LOPRESSOR) 25 mg tablet   • omeprazole (PriLOSEC) 20 mg delayed release capsule   • potassium chloride (K-DUR,KLOR-CON) 10 mEq tablet   • predniSONE 5 mg tablet   • triamcinolone (KENALOG) 0 1 % cream   • white petrolatum-mineral oil (EUCERIN,HYDROCERIN) cream     Counseling / Coordination of Care  Total floor / unit time spent today 20 minutes  Greater than 50% of total time was spent with the patient and / or family counseling and / or coordination of care  ** Please Note: Dragon 360 Dictation voice to text software may have been used in the creation of this document   **

## 2022-11-23 NOTE — PLAN OF CARE
Problem: OCCUPATIONAL THERAPY ADULT  Goal: Performs self-care activities at highest level of function for planned discharge setting  See evaluation for individualized goals  Description: Treatment Interventions: ADL retraining, Functional transfer training, UE strengthening/ROM, Endurance training, Cognitive reorientation, Patient/family training, Compensatory technique education, Continued evaluation, Energy conservation, Activityengagement          See flowsheet documentation for full assessment, interventions and recommendations  Outcome: Progressing  Note: Limitation: Decreased ADL status, Decreased UE ROM, Decreased UE strength, Decreased Safe judgement during ADL, Decreased cognition, Decreased endurance, Decreased self-care trans, Decreased high-level ADLs  Prognosis: Fair  Assessment: Pt was seen this date for OT tx session focusing on self care tasks, bed mobility, sit to stand progressions, standing tolerance, tranfers, safety awareness, compensatory techniques, energy conservation, and overall activity tolerance  Pt presents supine and is agreeable to OT tx session  Pt completes previously mentioned tasks at documented assist levels please see above in flow sheet  Pt was able to stand this date clearing the bed with max A x 2 and and significant  encouragement as pt is afraid of falling  Continues to require overall mod- max A for self cares  Pt is overall limited by decreased balance, fear of falling, increased fatigue, decreased strength, endurance, and activity tolerance and continues to function below baseline level  Pt resting in supine at end of session with all needs in reach and alarm on  Pt would benefit from continued OT Tx to improve overall functional abilities and increase independence  Will continue to follow with current POC       OT Discharge Recommendation: Post acute rehabilitation services

## 2022-11-23 NOTE — ASSESSMENT & PLAN NOTE
· Patient with history of psoriasis now with bilateral lower extremity erythema likely with cellulitis  · IV cefazolin  · Discussed with infectious disease  · Monitor

## 2022-11-23 NOTE — DISCHARGE INSTRUCTIONS
Take your medications as directed, and keep your follow up appointments  Adhere to a heart healthy lifestyle, maintaining a low sodium diet  Daily weight and record    If your weight increases 2-3 lbs in one day, or 5 lbs in 2 days, you are short of breath or have lower extremity swelling, please call Nurse Rico Wade at  [place] [address] office  at [phone]

## 2022-11-23 NOTE — CONSULTS
Consultation - Infectious Disease   Soledad Al 74 y o  female MRN: [id number]  Unit/Bed#: CW2 211-02 Encounter: [id number]      IMPRESSION & RECOMMENDATIONS:   Impression/Recommendations: This is a 74 y o  female, with multiple medical problems outlined below, initially admitted on 11/29 with bilateral leg edema  Patient now has increasing pain in both legs and with fever for the last 48 hours  1  Bilateral leg cellulitis  Source is most likely due extensive psoriatic rash  Findings of cellulitis on examination is somewhat equivocal   However, given increasing pain/tenderness, warmth and with unexplained fever, cellulitis is likely  Cellulitis has the appearance of streptococcal infection  Will continue antibiotic, with modification  Change antibiotic to IV cefazolin  Serial leg exams  Monitor temperature/WBC  Rapid transition to p o  Keflex if patient improves clinically  2  Bacteremia, with growth of coagulase-negative Staphylococcus in 1 out of 2 blood cultures  Patient is systemically well, without evidence of sepsis or systemic toxicity  She has no indwelling intravascular foreign body  Patient has difficult blood draw on IV access  This is most likely contaminated blood draw  No antibiotic needed for this  Follow-up on 2nd concurrent blood culture  3  AMANDA, likely secondary to diuresis  Creatinine stable  Antibiotic at full dose for now  Monitor creatinine  4  Acute diastolic heart failure, with bilateral leg edema  Patient is currently on diuresis  Diuresis per primary service  5  RA, on outpatient hydroxychloroquine and low-dose prednisone  Continue outpatient treatment  6  PCN allergy, with rash  Monitor for cross allergic reaction to PCN  Prior records reviewed in detail  Discussed with patient in detail regarding the above plan  Discussed with Dr Julia Anaya from [facility] service  Thank you for this consultation  We will follow along with you      HISTORY OF PRESENT ILLNESS:  Reason for Consult: Fever  Bacteremia  HPI: Homer Flores is a 74 y o  female, with multiple medical problems including RA, psoriasis, sarcoidosis, PAF and leg edema, presented to ER on  with worsening leg edema  On presentation, patient did not have fever or leukocytosis  She was admitted and started on diuresis  Due to lack of cellulitis on physical exam, patient was not treated with antibiotic  Despite diuresis, patient's leg edema is without significant improvement  In addition, she now complains of pain in both lower legs  On the evening of , patient developed fever  Blood cultures were obtained  One set now has growth of coagulase-negative Staphylococcus  IV vancomycin was started  We are asked to evaluate the patient  At present, patient complains of pain in both lower legs, worse on right  Otherwise, she feels fine  No further fever or chills  No focal complaints  Patient has long standing history of psoriasis  She is not sure whether she saw dermatology previously  REVIEW OF SYSTEMS:  A complete system-based review was done  Except for what is noted in HPI above, ROS of systems is otherwise negative      PAST MEDICAL HISTORY:  Past Medical History:   Diagnosis Date   • Abnormal thyroid function test     last assessed: 2015    • Arthritis    • Caries     last assessed: 2016    • Continuous opioid dependence ([facility] [address] ) 2021   • Edema of right lower extremity     last assessed: 2015    • GERD (gastroesophageal reflux disease)    • Hypertension    • Medicare annual wellness visit, subsequent 2021   • Positive blood culture 3/11/2022   • Sarcoid      Past Surgical History:   Procedure Laterality Date   •  SECTION     • MULTIPLE TOOTH EXTRACTIONS N/A 2016    Procedure: Surgical extraction of teeth 2, 18, 19, 30, 31; incision and drainage of left subperiosteal abscess ;  Surgeon: Connor Gillepsie DMD;  Location: [facility];  Service:      Problem list reviewed  FAMILY HISTORY:  Non-contributory    SOCIAL HISTORY:  Social History     Substance and Sexual Activity   Alcohol Use No     Social History     Substance and Sexual Activity   Drug Use No     Social History     Tobacco Use   Smoking Status Never   Smokeless Tobacco Never       ALLERGIES:  Allergies   Allergen Reactions   • Methotrexate Derivatives    • Amoxicillin Rash   • Ampicillin-Sulbactam Sodium Rash   • Shellfish-Derived Products - Food Allergy Itching       MEDICATIONS:  All current active medications have been reviewed  Patient is currently on IV vancomycin  PHYSICAL EXAM:  Vitals:  Temp:  [98 4 °F (36 9 °C)-100 4 °F (38 °C)] 99 °F (37 2 °C)  HR:  [] 91  Resp:  [18] 18  BP: (106-109)/(50-52) 107/50  SpO2:  [95 %-99 %] 99 %  Temp (24hrs), Av 3 °F (37 4 °C), Min:98 4 °F (36 9 °C), Max:100 4 °F (38 °C)  Current: Temperature: 99 °F (37 2 °C)     Physical Exam:  General:  Morbidly obese, acute and chronically ill appearing, nontoxic, in no acute distress  Awake, alert and oriented x 3  Eyes:  Conjunctive clear with no hemorrhages or effusions  Oropharynx:  No ulcers, no lesions, pharynx benign, no tonsillitis  Neck:  Supple, no lymphadenopathy, no mass, nontender  Lungs:  Expansion symmetric, no rales, no wheezing, no accessory muscle use  Cardiac:  Regular rate and rhythm, normal S1, normal S2, no murmurs  Abdomen:  Soft, nondistended, non-tender, no HSM  Extremities:  2+ leg edema  Extensive psoriatic rash in bilateral thighs  Bilateral lower leg with mild erythema/warmth and moderate diffuse tenderness  Knee and ankle without effusion  Skin:  Diffuse psoriatic rashes, no ulcers  Neurological:  Moves all four extremities spontaneously, sensation grossly intact    LABS, IMAGING, & OTHER STUDIES:  Lab Results:  I have personally reviewed pertinent labs    Results from last 7 days   Lab Units 22  0515 22  0450 22  8758 11/21/22  0526 11/20/22  0606 11/19/22  1255   POTASSIUM mmol/L 3 8 4 0  --  3 7 4 3 4 7   CHLORIDE mmol/L 105 105  --  105 110* 107   CO2 mmol/L 28 28  --  29 26 28   CO2, I-STAT mmol/L  --   --  31  --   --   --    BUN mg/dL 30* 26*  --  23 25 22   CREATININE mg/dL 1 54* 1 62*  --  1 35* 1 28 1 34*   EGFR ml/min/1 73sq m 33 31  --  38 41 39   GLUCOSE, ISTAT mg/dl  --   --  123  --   --   --    CALCIUM mg/dL 8 8 9 1  --  9 1 8 9 9 7   AST U/L  --   --   --   --  26 27   ALT U/L  --   --   --   --  28 37   ALK PHOS U/L  --   --   --   --  60 74     Results from last 7 days   Lab Units 11/23/22  0515 11/22/22  0450 11/21/22  1619   WBC Thousand/uL 8 78 9 94 10 59*   HEMOGLOBIN g/dL 10 6* 11 4* 11 7   PLATELETS Thousands/uL 209 216 234     Results from last 7 days   Lab Units 11/22/22  0116   BLOOD CULTURE  No Growth at 24 hrs  GRAM STAIN RESULT  Gram positive cocci in clusters*       Imaging Studies:   I have personally reviewed pertinent imaging study reports and images in PACS  CXR reviewed personally  No consolidations  Head CT reviewed personally  No acute intracranial pathology  Right knee x-ray reviewed personally  No bony abnormality  No effusion  Right lower leg x-ray reviewed personally  No bony abnormalities  EKG, Pathology, and Other Studies:   I have personally reviewed pertinent reports

## 2022-11-24 PROBLEM — L03.119 LOWER EXTREMITY CELLULITIS: Status: ACTIVE | Noted: 2017-10-27

## 2022-11-24 LAB
ANION GAP SERPL CALCULATED.3IONS-SCNC: 4 MMOL/L (ref 4–13)
BASOPHILS # BLD AUTO: 0.02 THOUSANDS/ÂΜL (ref 0–0.1)
BASOPHILS NFR BLD AUTO: 0 % (ref 0–1)
BUN SERPL-MCNC: 31 MG/DL (ref 5–25)
CALCIUM SERPL-MCNC: 8.9 MG/DL (ref 8.3–10.1)
CHLORIDE SERPL-SCNC: 106 MMOL/L (ref 96–108)
CO2 SERPL-SCNC: 29 MMOL/L (ref 21–32)
CREAT SERPL-MCNC: 1.48 MG/DL (ref 0.6–1.3)
EOSINOPHIL # BLD AUTO: 0.25 THOUSAND/ÂΜL (ref 0–0.61)
EOSINOPHIL NFR BLD AUTO: 3 % (ref 0–6)
ERYTHROCYTE [DISTWIDTH] IN BLOOD BY AUTOMATED COUNT: 14.4 % (ref 11.6–15.1)
GFR SERPL CREATININE-BSD FRML MDRD: 34 ML/MIN/1.73SQ M
GLUCOSE SERPL-MCNC: 89 MG/DL (ref 65–140)
HCT VFR BLD AUTO: 33.7 % (ref 34.8–46.1)
HGB BLD-MCNC: 10.4 G/DL (ref 11.5–15.4)
IMM GRANULOCYTES # BLD AUTO: 0.03 THOUSAND/UL (ref 0–0.2)
IMM GRANULOCYTES NFR BLD AUTO: 0 % (ref 0–2)
LYMPHOCYTES # BLD AUTO: 1 THOUSANDS/ÂΜL (ref 0.6–4.47)
LYMPHOCYTES NFR BLD AUTO: 12 % (ref 14–44)
MCH RBC QN AUTO: 28.3 PG (ref 26.8–34.3)
MCHC RBC AUTO-ENTMCNC: 30.9 G/DL (ref 31.4–37.4)
MCV RBC AUTO: 92 FL (ref 82–98)
MONOCYTES # BLD AUTO: 0.78 THOUSAND/ÂΜL (ref 0.17–1.22)
MONOCYTES NFR BLD AUTO: 9 % (ref 4–12)
NEUTROPHILS # BLD AUTO: 6.19 THOUSANDS/ÂΜL (ref 1.85–7.62)
NEUTS SEG NFR BLD AUTO: 76 % (ref 43–75)
NRBC BLD AUTO-RTO: 0 /100 WBCS
PLATELET # BLD AUTO: 223 THOUSANDS/UL (ref 149–390)
PMV BLD AUTO: 11.6 FL (ref 8.9–12.7)
POTASSIUM SERPL-SCNC: 3.7 MMOL/L (ref 3.5–5.3)
RBC # BLD AUTO: 3.67 MILLION/UL (ref 3.81–5.12)
SODIUM SERPL-SCNC: 139 MMOL/L (ref 135–147)
WBC # BLD AUTO: 8.27 THOUSAND/UL (ref 4.31–10.16)

## 2022-11-24 RX ADMIN — HYDROCORTISONE: 25 CREAM TOPICAL at 09:24

## 2022-11-24 RX ADMIN — APIXABAN 5 MG: 5 TABLET, FILM COATED ORAL at 09:23

## 2022-11-24 RX ADMIN — CEFAZOLIN SODIUM 1000 MG: 1 SOLUTION INTRAVENOUS at 00:00

## 2022-11-24 RX ADMIN — PREDNISONE 5 MG: 5 TABLET ORAL at 09:23

## 2022-11-24 RX ADMIN — APIXABAN 5 MG: 5 TABLET, FILM COATED ORAL at 17:39

## 2022-11-24 RX ADMIN — HYDROCORTISONE: 25 CREAM TOPICAL at 17:40

## 2022-11-24 RX ADMIN — CEFAZOLIN SODIUM 1000 MG: 1 SOLUTION INTRAVENOUS at 06:18

## 2022-11-24 RX ADMIN — PANTOPRAZOLE SODIUM 40 MG: 40 TABLET, DELAYED RELEASE ORAL at 06:18

## 2022-11-24 RX ADMIN — GABAPENTIN 300 MG: 300 CAPSULE ORAL at 22:14

## 2022-11-24 RX ADMIN — METOPROLOL TARTRATE 25 MG: 25 TABLET, FILM COATED ORAL at 22:14

## 2022-11-24 RX ADMIN — FUROSEMIDE 40 MG: 40 TABLET ORAL at 09:23

## 2022-11-24 RX ADMIN — HYDROCORTISONE: 25 CREAM TOPICAL at 22:14

## 2022-11-24 RX ADMIN — PREDNISONE 5 MG: 5 TABLET ORAL at 17:39

## 2022-11-24 RX ADMIN — METOPROLOL TARTRATE 25 MG: 25 TABLET, FILM COATED ORAL at 09:23

## 2022-11-24 RX ADMIN — HYDROXYCHLOROQUINE SULFATE 200 MG: 200 TABLET ORAL at 09:24

## 2022-11-24 RX ADMIN — HYDROXYCHLOROQUINE SULFATE 200 MG: 200 TABLET ORAL at 17:40

## 2022-11-24 RX ADMIN — CEFAZOLIN SODIUM 1000 MG: 1 SOLUTION INTRAVENOUS at 22:17

## 2022-11-24 RX ADMIN — Medication: at 09:24

## 2022-11-24 RX ADMIN — ALBUTEROL SULFATE 2 PUFF: 90 AEROSOL, METERED RESPIRATORY (INHALATION) at 17:39

## 2022-11-24 RX ADMIN — NYSTATIN: 100000 POWDER TOPICAL at 17:41

## 2022-11-24 RX ADMIN — NYSTATIN: 100000 POWDER TOPICAL at 09:24

## 2022-11-24 RX ADMIN — CEFAZOLIN SODIUM 1000 MG: 1 SOLUTION INTRAVENOUS at 17:39

## 2022-11-24 NOTE — PROGRESS NOTES
[address] 1950, 74 y o  female MRN: [id number]  Unit/Bed#: 2 211-02 Encounter: [id number]  Primary Care Provider: Tomy Mahmood DO   Date and time admitted to hospital: 11/19/2022 11:38 AM    * Acute diastolic heart failure (HCC)  Assessment & Plan  Acute diastolic congestive heart failure  2D echo EF 43%, grade 2 diastolic dysfunction  Now on Lasix 40 mg p o  Daily  Continue metoprolol  Monitor I/O daily weights  Cardiology input noted        Hyperuricemia  Assessment & Plan  · Hyperuricemia  · Outpatient follow-up    Gram-positive cocci bacteremia  Assessment & Plan  One of 2 blood cultures positive coagulase negative Staphylococcus  Likely contaminant  Monitor closely  Infectious Disease input noted    Paroxysmal atrial fibrillation (HCC)  Assessment & Plan  History of paroxysmal AFib on Eliquis and rate controlled metoprolol, will continue    Lower extremity cellulitis  Assessment & Plan  · Patient with history of psoriasis now with bilateral lower extremity erythema likely with cellulitis  · IV cefazolin  · Discussed with infectious disease  · Monitor     Morbid obesity ([facility] [address] )  Assessment & Plan  As evidenced by BMI over 45, discussed lifestyle nutritional changes    Benign essential hypertension  Assessment & Plan  History of hypertension continue Lopressor 25 mg b i d  On diuresis will hold hydrochlorothiazide    Sarcoid  Assessment & Plan  History of sarcoid     GERD (gastroesophageal reflux disease)  Assessment & Plan  Continue Protonix    Rheumatoid arthritis ([address] )  Assessment & Plan  Patient follows with Rheumatology   continue Plaquenil and prednisone 5 mg b i d  SIRS (systemic inflammatory response syndrome) ([facility])  Assessment & Plan  Likely sirs  Improving   Follow-up on cultures studies      Lethargy-resolved as of 11/23/2022  Assessment & Plan  VBG with no signs of CO2 retention  Resolved    · Will continue to monitor            VTE Pharmacologic Prophylaxis:   High Risk (Score >/= 5) - Pharmacological DVT Prophylaxis Ordered: apixaban (Eliquis)  Sequential Compression Devices Ordered  Patient Centered Rounds: I performed bedside rounds with nursing staff today  Discussions with Specialists or Other Care Team Provider:  Infectious Disease Dr Guy Gutierrez    Education and Discussions with Family / Patient: Discussed with the patient, called and left a message for daughter Sebastián Manzanares  Time Spent for Care: 30 minutes  More than 50% of total time spent on counseling and coordination of care as described above  Current Length of Stay: 4 day(s)  Current Patient Status: Inpatient   Certification Statement: The patient will continue to require additional inpatient hospital stay due to As outlined  Discharge Plan: Awaiting clinical symptomatic improvement, physical therapy recommends rehab at discharge case management following    Code Status: Level 1 - Full Code    Subjective:     Comfortably in bed  Reports feeling okay  Reports improved shortness of breath    Objective:     Vitals:   Temp (24hrs), Av 9 °F (37 2 °C), Min:98 3 °F (36 8 °C), Max:99 4 °F (37 4 °C)    Temp:  [98 3 °F (36 8 °C)-99 4 °F (37 4 °C)] 98 3 °F (36 8 °C)  HR:  [85-99] 85  Resp:  [20] 20  BP: (102-117)/(53-58) 102/57  SpO2:  [96 %-100 %] 100 %  Body mass index is 48 52 kg/m²  Input and Output Summary (last 24 hours):      Intake/Output Summary (Last 24 hours) at 2022 1324  Last data filed at 2022 1251  Gross per 24 hour   Intake 480 ml   Output 1700 ml   Net -1220 ml       Physical Exam:   Physical Exam       Comfortably in bed  Obese  Short thick neck  Lungs diminished breath sounds bilateral  Heart sounds S1-S2 noted  Heart sounds distant  Abdomen soft nontender  Abdominal obesity noted  Awake obeys simple commands  Skin erythematous patches noted  Lower extremities exfoliation possible cellulitic changes noted  Lower extremity edema improving    Additional Data:     Labs:  Results from last 7 days   Lab Units 11/24/22  0526   WBC Thousand/uL 8 27   HEMOGLOBIN g/dL 10 4*   HEMATOCRIT % 33 7*   PLATELETS Thousands/uL 223   NEUTROS PCT % 76*   LYMPHS PCT % 12*   MONOS PCT % 9   EOS PCT % 3     Results from last 7 days   Lab Units 11/24/22  0526 11/21/22  0526 11/20/22  0606   SODIUM mmol/L 139   < > 141   POTASSIUM mmol/L 3 7   < > 4 3   CHLORIDE mmol/L 106   < > 110*   CO2 mmol/L 29   < > 26   CO2, I-STAT   --    < >  --    BUN mg/dL 31*   < > 25   CREATININE mg/dL 1 48*   < > 1 28   ANION GAP mmol/L 4   < > 5   CALCIUM mg/dL 8 9   < > 8 9   ALBUMIN g/dL  --   --  2 2*   TOTAL BILIRUBIN mg/dL  --   --  0 45   ALK PHOS U/L  --   --  60   ALT U/L  --   --  28   AST U/L  --   --  26   GLUCOSE RANDOM mg/dL 89   < > 89    < > = values in this interval not displayed  Results from last 7 days   Lab Units 11/22/22  0450   HEMOGLOBIN A1C % 5 1     Results from last 7 days   Lab Units 11/23/22  0515 11/22/22  0450   PROCALCITONIN ng/ml 0 17 0 20       Lines/Drains:  Invasive Devices     Peripheral Intravenous Line  Duration           Peripheral IV 11/23/22 Left Forearm 1 day          Drain  Duration           External Urinary Catheter -- days                      Imaging: No pertinent imaging reviewed  Recent Cultures (last 7 days):   Results from last 7 days   Lab Units 11/24/22  0528 11/22/22  0116   BLOOD CULTURE  Received in Microbiology Lab  Culture in Progress  Received in Microbiology Lab  Culture in Progress  Staphylococcus coagulase negative*  No Growth at 48 hrs     GRAM STAIN RESULT   --  Gram positive cocci in clusters*       Last 24 Hours Medication List:   Current Facility-Administered Medications   Medication Dose Route Frequency Provider Last Rate   • acetaminophen  650 mg Oral Q6H PRN Bipin Scott PA-C     • albuterol  2 puff Inhalation Q6H PRN Jaron Chang, DO     • apixaban  5 mg Oral BID Jaron Morilloai, DO     • bisacodyl  10 mg Rectal Once Sona Chavez MD     • cefazolin  1,000 mg Intravenous Q8H Mariela Cosby MD 1,000 mg (11/24/22 0618)   • furosemide  40 mg Oral Daily AZIZA Carbone     • gabapentin  300 mg Oral HS Jaronluz maria Chang, DO     • hydrocortisone   Topical TID Sona Chavez MD     • hydroxychloroquine  200 mg Oral BID Jaron Banvíctor, DO     • metoprolol tartrate  25 mg Oral Q12H [address] Jaronluz maria Chang, DO     • nystatin   Topical BID Jaret Alexis MD     • pantoprazole  40 mg Oral Early Morning Jaronluz maria Chang, DO     • polyethylene glycol  17 g Oral Daily Sona Chavez MD     • predniSONE  5 mg Oral BID With Meals Jaron Chang, DO     • senna-docusate sodium  2 tablet Oral BID Sona Chavez MD     • white petrolatum-mineral oil   Topical 4x Daily PRN Jaret Alexis MD          Today, Patient Was Seen By: Sona Chavez MD    **Please Note: This note may have been constructed using a voice recognition system  **

## 2022-11-24 NOTE — PROGRESS NOTES
Progress Note - Infectious Disease   Sydni Al 74 y o  female MRN: [id number]  Unit/Bed#: CW2 211-02 Encounter: [id number]      Impression/Recommendations:  1  Bilateral leg cellulitis  Source is most likely due extensive psoriatic rash  Findings of cellulitis on examination is somewhat equivocal   However, given increasing pain/tenderness, warmth and with unexplained fever, cellulitis is likely  Cellulitis has the appearance of streptococcal infection  Both edema and pain/tenderness much improved antibiotic, making cellulitis likely  Continue IV cefazolin  Serial leg exams  Monitor temperature/WBC  Transition to p o  Keflex in the next 24-48 hours, if patient continues to improve clinically      2   Coagulase-negative Staphylococcus bacteremia, with growth in 1 out of 2 blood cultures  Patient remains systemically well, without evidence of sepsis or systemic toxicity  She has no indwelling intravascular foreign body  Patient has difficult blood draw on IV access  This is most likely contaminated blood draw  No antibiotic needed for this  Follow-up on 2nd concurrent blood culture      3  AMANDA, likely secondary to diuresis  Creatinine improving  Antibiotic at full dose for now  Monitor creatinine      4  Acute diastolic heart failure, with bilateral leg edema  Patient is currently on diuresis  Diuresis per primary service      5  RA, on outpatient hydroxychloroquine and low-dose prednisone  Continue outpatient treatment      6  PCN allergy, with rash  Monitor for cross allergic reaction to PCN      Discussed with patient in detail regarding the above plan  Discussed with Dr Blanquita Velasco from [facility] service      Antibiotics:  Cefazolin  Antibiotic # 2    Subjective:  Patient states leg pain and swelling much better today  Temperature stays down  No chills  She is tolerating antibiotic well  No nausea, vomiting or diarrhea      Objective:  Vitals:  Temp:  [98 3 °F (36 8 °C)-99 4 °F (37 4 °C)] 98 3 °F (36 8 °C)  HR:  [85-99] 85  Resp:  [20] 20  BP: (102-117)/(53-58) 102/57  SpO2:  [96 %-100 %] 100 %  Temp (24hrs), Av 9 °F (37 2 °C), Min:98 3 °F (36 8 °C), Max:99 4 °F (37 4 °C)  Current: Temperature: 98 3 °F (36 8 °C)    Physical Exam:     General: Awake, alert, cooperative, no distress  Neck:  Supple  No mass  No lymphadenopathy  Lungs: Expansion symmetric, no rales, no wheezing, respirations unlabored  Heart:  Regular rate and rhythm, S1 and S2 normal, no murmur  Abdomen: Soft, nondistended, non-tender, bowel sounds active all four quadrants, no masses, no organomegaly  Extremities: Improved leg edema  Improved erythema erythema/warmth  No open ulcer  Improved tenderness  Skin:  Stable diffuse psoriatic rash  Neuro: Moves all extremities  Invasive Devices     Peripheral Intravenous Line  Duration           Peripheral IV 22 Left Forearm 1 day          Drain  Duration           External Urinary Catheter -- days                Labs studies:   I have personally reviewed pertinent labs  Results from last 7 days   Lab Units 22  0526 22  0515 22  0450 22  1432 22  0526 22  0606 22  1255   POTASSIUM mmol/L 3 7 3 8 4 0  --    < > 4 3 4 7   CHLORIDE mmol/L 106 105 105  --    < > 110* 107   CO2 mmol/L 29 28 28  --    < > 26 28   CO2, I-STAT mmol/L  --   --   --  31  --   --   --    BUN mg/dL 31* 30* 26*  --    < > 25 22   CREATININE mg/dL 1 48* 1 54* 1 62*  --    < > 1 28 1 34*   EGFR ml/min/1 73sq m 34 33 31  --    < > 41 39   GLUCOSE, ISTAT mg/dl  --   --   --  123  --   --   --    CALCIUM mg/dL 8 9 8 8 9 1  --    < > 8 9 9 7   AST U/L  --   --   --   --   --  26 27   ALT U/L  --   --   --   --   --  28 37   ALK PHOS U/L  --   --   --   --   --  60 74    < > = values in this interval not displayed       Results from last 7 days   Lab Units 22  0526 22  0515 22  0450   WBC Thousand/uL 8 27 8 78 9 94   HEMOGLOBIN g/dL 10 4* 10 6* 11 4*   PLATELETS Thousands/uL 223 209 216     Results from last 7 days   Lab Units 11/24/22  0528 11/22/22  0116   BLOOD CULTURE  Received in Microbiology Lab  Culture in Progress  Received in Microbiology Lab  Culture in Progress  Staphylococcus coagulase negative*  No Growth at 48 hrs  GRAM STAIN RESULT   --  Gram positive cocci in clusters*       Imaging Studies:   I have personally reviewed pertinent imaging study reports and images in PACS  EKG, Pathology, and Other Studies:   I have personally reviewed pertinent reports

## 2022-11-25 VITALS
HEIGHT: 63 IN | DIASTOLIC BLOOD PRESSURE: 55 MMHG | TEMPERATURE: 98.6 F | RESPIRATION RATE: 18 BRPM | OXYGEN SATURATION: 99 % | WEIGHT: 268.5 LBS | HEART RATE: 86 BPM | SYSTOLIC BLOOD PRESSURE: 129 MMHG | BODY MASS INDEX: 47.57 KG/M2

## 2022-11-25 LAB
BACTERIA BLD CULT: ABNORMAL
GRAM STN SPEC: ABNORMAL
S AUREUS+CONS DNA BLD POS NAA+NON-PROBE: DETECTED

## 2022-11-25 RX ORDER — FUROSEMIDE 40 MG/1
40 TABLET ORAL DAILY
Qty: 30 TABLET | Refills: 0
Start: 2022-11-26 | End: 2022-12-26

## 2022-11-25 RX ORDER — CEPHALEXIN 500 MG/1
500 CAPSULE ORAL EVERY 8 HOURS SCHEDULED
Status: DISCONTINUED | OUTPATIENT
Start: 2022-11-25 | End: 2022-11-25 | Stop reason: HOSPADM

## 2022-11-25 RX ORDER — NYSTATIN 100000 [USP'U]/G
POWDER TOPICAL 2 TIMES DAILY
Qty: 15 G | Refills: 0
Start: 2022-11-25

## 2022-11-25 RX ORDER — AMOXICILLIN 250 MG
1 CAPSULE ORAL DAILY
Qty: 30 TABLET | Refills: 0
Start: 2022-11-25 | End: 2022-12-25

## 2022-11-25 RX ORDER — POLYETHYLENE GLYCOL 3350 17 G/17G
17 POWDER, FOR SOLUTION ORAL DAILY
Qty: 510 G | Refills: 0
Start: 2022-11-26 | End: 2022-12-26

## 2022-11-25 RX ORDER — CEPHALEXIN 500 MG/1
500 CAPSULE ORAL EVERY 8 HOURS SCHEDULED
Qty: 18 CAPSULE | Refills: 0
Start: 2022-11-25 | End: 2022-12-01

## 2022-11-25 RX ADMIN — PANTOPRAZOLE SODIUM 40 MG: 40 TABLET, DELAYED RELEASE ORAL at 06:31

## 2022-11-25 RX ADMIN — HYDROCORTISONE: 25 CREAM TOPICAL at 08:33

## 2022-11-25 RX ADMIN — Medication: at 08:34

## 2022-11-25 RX ADMIN — FUROSEMIDE 40 MG: 40 TABLET ORAL at 08:30

## 2022-11-25 RX ADMIN — METOPROLOL TARTRATE 25 MG: 25 TABLET, FILM COATED ORAL at 08:30

## 2022-11-25 RX ADMIN — CEFAZOLIN SODIUM 1000 MG: 1 SOLUTION INTRAVENOUS at 06:30

## 2022-11-25 RX ADMIN — NYSTATIN: 100000 POWDER TOPICAL at 08:34

## 2022-11-25 RX ADMIN — APIXABAN 5 MG: 5 TABLET, FILM COATED ORAL at 08:30

## 2022-11-25 RX ADMIN — HYDROXYCHLOROQUINE SULFATE 200 MG: 200 TABLET ORAL at 08:33

## 2022-11-25 RX ADMIN — PREDNISONE 5 MG: 5 TABLET ORAL at 08:30

## 2022-11-25 NOTE — ASSESSMENT & PLAN NOTE
Acute diastolic congestive heart failure  2D echo EF 50%, grade 2 diastolic dysfunction  Now on Lasix 40 mg p o   Daily  Continue metoprolol  Monitor I/O daily weights  Cardiology input noted

## 2022-11-25 NOTE — DISCHARGE SUMMARY
[address] [facility]  Discharge- Nasir Leyva 1950, 74 y o  female MRN: [id number]  Unit/Bed#: 2 211-02 Encounter: [id number]  Primary Care Provider: Alfonso Miranda DO   Date and time admitted to hospital: 11/19/2022 11:38 AM    * Acute diastolic heart failure (HCC)  Assessment & Plan  Acute diastolic congestive heart failure  2D echo EF 43%, grade 2 diastolic dysfunction  Now on Lasix 40 mg p o  Daily  Continue metoprolol  Monitor I/O daily weights  Cardiology input noted        Hyperuricemia  Assessment & Plan  · Hyperuricemia  · Outpatient follow-up    Gram-positive cocci bacteremia  Assessment & Plan  One of 2 blood cultures positive coagulase negative Staphylococcus  Likely contaminant  Monitor closely  Infectious Disease input noted    Paroxysmal atrial fibrillation (HCC)  Assessment & Plan  History of paroxysmal AFib on Eliquis and rate controlled metoprolol, will continue    Lower extremity cellulitis  Assessment & Plan  · Patient with history of psoriasis now with bilateral lower extremity erythema likely with cellulitis  · Received IV cefazolin, infectious Disease input noted transition to p o  Keflex to complete 7 day course of antibiotics    Morbid obesity ([facility] [address] )  Assessment & Plan  As evidenced by BMI over 45, discussed lifestyle nutritional changes    Benign essential hypertension  Assessment & Plan  History of hypertension continue Lopressor 25 mg b i d  Sarcoid  Assessment & Plan  History of sarcoid     Rheumatoid arthritis ([address] )  Assessment & Plan  Patient follows with Rheumatology   continue Plaquenil and prednisone 5 mg b i d      SIRS (systemic inflammatory response syndrome) ([facility] [address] )  Assessment & Plan  Likely sirs  Improving                 Discharge Summary - [facility]    Patient Information: Nasir Leyva 74 y o  female MRN: [id number]  Unit/Bed#: 2 211-02 Encounter: [id number]    Discharging Physician / Practitioner: Evelyne White Jaleesa Phelan MD  PCP: Thu Fuller DO  Admission Date: 11/19/2022  Discharge Date: 11/25/22    Disposition:     Other: SNF    Reason for Admission:  Lower extremity edema pain    Discharge Diagnoses:     Principal Problem:    Acute diastolic heart failure ([address] [facility] )  Active Problems:    SIRS (systemic inflammatory response syndrome) (HCC)    Rheumatoid arthritis (HCC)    GERD (gastroesophageal reflux disease)    Sarcoid    Benign essential hypertension    Morbid obesity ([address] )    Lower extremity cellulitis    Paroxysmal atrial fibrillation ([facility])    Gram-positive cocci bacteremia    Hyperuricemia  Resolved Problems:    Lethargy      Consultations During Hospital Stay:  Cardiology  Infectious disease    Procedures Performed:     · Right knee x-ray osteoarthritis, no acute osseous abnormality  · Chest radiograph minimal CHF  · Bilateral lower extremity venous Doppler no evidence of DVT  · CT head no acute intracranial abnormality, chronic microangiopathic changes, Air-fluid level in the right maxillary sinus, which could represent acute sinusitis   · Chest x-ray no active lung disease, diminished lung volumes  · 2D echo EF 34%, grade 2 diastolic dysfunction        Hospital Course:     Blane Robledo is a 74 y o  female patient who originally presented to the hospital on 11/19/2022 due to lower extremity swelling pain  Patient was diagnosed with acute diastolic congestive heart failure she received diuresis with Cardiology  Her diuresis at to be on hold due to worsening kidney function, she has now resumed on Lasix 40 mg p o  Daily  She will continue to hold hydrochlorothiazide at discharge  Patient noted to have sirs criteria likely due to extremity cellulitis from her psoriatic lesion  She was treated with IV antibiotics and transition to p o  Keflex to complete total 7 day course of antibiotics  One of 2 blood cultures positive for Staphylococcus aureus coagulase negative likely contaminant      Her chronic conditions remained stable, she is recommended outpatient follow-up with primary care physician  She is recommended outpatient dermatology follow-up  She has symptomatically improving hemodynamically stable and is deemed ready for discharge  Kindly review the chart for details  Condition at Discharge: fair     Discharge Day Visit / Exam:     Subjective:      Comfortably sitting up in bed  Reports feeling okay  Encourage incentive spirometry  Agreeable to discharge plan    Vitals: Blood Pressure: 129/55 (11/25/22 0832)  Pulse: 86 (11/25/22 0832)  Temperature: 98 6 °F (37 °C) (11/25/22 0641)  Temp Source: Oral (11/25/22 0641)  Respirations: 18 (11/25/22 0641)  Height: 5' 3" (160 cm) (11/20/22 1600)  Weight - Scale: 122 kg (268 lb 8 oz) (11/25/22 0544)  SpO2: 99 % (11/25/22 0832)  Exam:   Physical Exam    Comfortably in bed  Obese  Short thick neck  Lungs diminished breath sounds bilaterally  Heart sounds S1-S2 noted  Heart sounds are distant  Abdomen soft nontender  Abdominal obesity noted  Awake obeys simple commands  Skin erythematous rash seborrheic rash noted  Pedal edema improved      Discharge instructions/Information to patient and family:   See after visit summary for information provided to patient and family  Discharge plan discussed with infectious disease Dr Guy Gutierrez   Discharge plan discussed the patient, called and left a message for daughter Franky Carvajal follow-up with Cardiology, Dermatology, primary care physician    Provisions for Follow-Up Care:  See after visit summary for information related to follow-up care and any pertinent home health orders  Planned Readmission: no     Discharge Statement:  I spent 45 minutes discharging the patient  This time was spent on the day of discharge  I had direct contact with the patient on the day of discharge   Greater than 50% of the total time was spent examining patient, answering all patient questions, arranging and discussing plan of care with patient as well as directly providing post-discharge instructions  Additional time then spent on discharge activities  Discharge Medications:  See after visit summary for reconciled discharge medications provided to patient and family        ** Please Note: This note has been constructed using a voice recognition system **

## 2022-11-25 NOTE — PLAN OF CARE
Problem: Potential for Falls  Goal: Patient will remain free of falls  Description: INTERVENTIONS:  - Educate patient/family on patient safety including physical limitations  - Instruct patient to call for assistance with activity   - Consult OT/PT to assist with strengthening/mobility   - Keep Call bell within reach  - Keep bed low and locked with side rails adjusted as appropriate  - Keep care items and personal belongings within reach  - Initiate and maintain comfort rounds  - Make Fall Risk Sign visible to staff  - Offer Toileting every 2-4 Hours, in advance of need  - Initiate/Maintain bed/chir alarm  - Obtain necessary fall risk management equipment: nonskid footware  - Apply yellow socks and bracelet for high fall risk patients  - Consider moving patient to room near nurses station  Outcome: Progressing     Problem: Prexisting or High Potential for Compromised Skin Integrity  Goal: Skin integrity is maintained or improved  Description: INTERVENTIONS:  - Identify patients at risk for skin breakdown  - Assess and monitor skin integrity  - Assess and monitor nutrition and hydration status  - Monitor labs   - Assess for incontinence   - Turn and reposition patient  - Assist with mobility/ambulation  - Relieve pressure over bony prominences  - Avoid friction and shearing  - Provide appropriate hygiene as needed including keeping skin clean and dry  - Evaluate need for skin moisturizer/barrier cream  - Collaborate with interdisciplinary team   - Patient/family teaching  - Consider wound care consult   Outcome: Progressing     Problem: MOBILITY - ADULT  Goal: Maintain or return to baseline ADL function  Description: INTERVENTIONS:  -  Assess patient's ability to carry out ADLs; assess patient's baseline for ADL function and identify physical deficits which impact ability to perform ADLs (bathing, care of mouth/teeth, toileting, grooming, dressing, etc )  - Assess/evaluate cause of self-care deficits   - Assess range of motion  - Assess patient's mobility; develop plan if impaired  - Assess patient's need for assistive devices and provide as appropriate  - Encourage maximum independence but intervene and supervise when necessary  - Involve family in performance of ADLs  - Assess for home care needs following discharge   - Consider OT consult to assist with ADL evaluation and planning for discharge  - Provide patient education as appropriate  Outcome: Progressing  Goal: Maintains/Returns to pre admission functional level  Description: INTERVENTIONS:  - Perform BMAT or MOVE assessment daily    - Set and communicate daily mobility goal to care team and patient/family/caregiver  - Collaborate with rehabilitation services on mobility goals if consulted  - Perform Range of Motion 3 times a day  - Reposition patient every 3 hours    - Dangle patient 3 times a day  - Stand patient 3 times a day  - Ambulate patient 2 times a day  - Out of bed to chair 3 times a day   - Out of bed for meals 2 times a day  - Out of bed for toileting  - Record patient progress and toleration of activity level   Outcome: Progressing

## 2022-11-25 NOTE — PROGRESS NOTES
General Cardiology   Progress Note -  Team One   Micheline Montejo 74 y o  female MRN: [id number]    Unit/Bed#: CW2 211-02 Encounter: [id number]    Assessment/ Plan:    1  Acute diastolic heart failure  ProBNP 1,717 on admission   Echocardiogram reviewed showing EF 53%, grade II systolic dysfunction, LA mildly dilated and no significant valvular disease F   Received furosemide 40 mg IV x 3 doses this admission  Creatinine increased therefore held diuretics 11/22  Started on oral diuretics lasix 40mg PO daily on 11/23  Creatinine continues to improve    Will start oral diuretics today  Monitor I/Os -341ml in 24 hours   Daily weights 268 lbs (bed scale)   continu 2 gram Na diet with fluid restriction     Pt is well compensated from HF standpoint; would continue lasix 40mg PO daily on discharge  Per conversation with granddaughter Corinna Snowden they cannot get pt to offices due to mobility issues; changed cardiology follow up on 12/8 to virtual visit  VM left for her daughter Jazmin Merritt with instructions for virtual visit      2  Paroxysmal atrial fibrillation   On eliquis 5 mg PO BID for OAC  On metoprolol 25 mg PO BID   Not on telemetry but sounds like in rate controlled afib on exam      3  Hypertension   BP stable: most recent 129/55    4  B/l LE cellulitis: ID following; staph bacteremia 1/2 BC; likely contaminant  On Abc per ID  Cardiology will sign off, please call with any questions/concerns  Subjective: Pt seen for follow up; no events overnight  Reports feeling ok today; somewhat fatigued  No fevers or chills  LE swelling remains better  Review of Systems   Constitutional: Positive for malaise/fatigue  Negative for decreased appetite and fever  Cardiovascular: Negative for chest pain, dyspnea on exertion, leg swelling, orthopnea and palpitations  Respiratory: Negative for cough and shortness of breath  Gastrointestinal: Negative for abdominal pain, nausea and vomiting     Genitourinary: Negative for dysuria  Neurological: Negative for dizziness and light-headedness  Psychiatric/Behavioral: Negative for altered mental status  All other systems reviewed and are negative  Objective:   Vitals: Blood pressure 129/55, pulse 86, temperature 98 6 °F (37 °C), temperature source Oral, resp  rate 18, height 5' 3" (1 6 m), weight 122 kg (268 lb 8 oz), SpO2 99 %  ,     Body mass index is 47 56 kg/m²  ,     Systolic (30YOB), DMK:469 , Min:105 , MZU:549     Diastolic (94OKY), SHH:78, Min:55, Max:60      Intake/Output Summary (Last 24 hours) at 11/25/2022 1039  Last data filed at 11/25/2022 0832  Gross per 24 hour   Intake 660 ml   Output 200 ml   Net 460 ml     Weight (last 2 days)     Date/Time Weight    11/25/22 0544 122 (268 5)    11/24/22 0600 124 (273 9)    11/23/22 0600 124 (273 2)        Telemetry Review: No telemetry    Physical Exam  Vitals reviewed  Constitutional:       General: She is not in acute distress  Appearance: Normal appearance  She is obese  HENT:      Head: Normocephalic  Mouth/Throat:      Mouth: Mucous membranes are moist    Cardiovascular:      Rate and Rhythm: Normal rate  Rhythm irregularly irregular  Heart sounds: S1 normal and S2 normal       Comments: Trace LE edema  Pulmonary:      Effort: Pulmonary effort is normal       Breath sounds: Normal breath sounds  No wheezing or rales  Abdominal:      Palpations: Abdomen is soft  Comments: obese   Skin:     General: Skin is warm  Comments: Diffuse dry and flaking    Neurological:      Mental Status: She is alert and oriented to person, place, and time     Psychiatric:         Mood and Affect: Mood normal        LABORATORY RESULTS      CBC with diff:   Results from last 7 days   Lab Units 11/24/22  0526 11/23/22  0515 11/22/22  0450 11/21/22  1619 11/21/22  1432 11/20/22  0606 11/19/22  1255   WBC Thousand/uL 8 27 8 78 9 94 10 59*  --  6 84 10 72*   HEMOGLOBIN g/dL 10 4* 10 6* 11 4* 11 7  --  11 2* 12 8   I STAT HEMOGLOBIN g/dl  --   --   --   --  12 9  --   --    HEMATOCRIT % 33 7* 34 0* 37 6 39 0  --  36 7 42 0   HEMATOCRIT, ISTAT %  --   --   --   --  38  --   --    MCV fL 92 90 92 92  --  92 92   PLATELETS Thousands/uL 223 209 216 234  --  195 230   MCH pg 28 3 28 2 28 0 27 5  --  28 1 28 1   MCHC g/dL 30 9* 31 2* 30 3* 30 0*  --  30 5* 30 5*   RDW % 14 4 14 4 14 6 14 5  --  14 6 14 6   MPV fL 11 6 11 5 11 7 11 4  --  11 4 11 6   NRBC AUTO /100 WBCs 0 0 0  --   --  0 0     CMP:  Results from last 7 days   Lab Units 11/24/22 0526 11/23/22  0515 11/22/22  0450 11/21/22  1432 11/21/22  0526 11/20/22  0606 11/19/22  1255   POTASSIUM mmol/L 3 7 3 8 4 0  --  3 7 4 3 4 7   CHLORIDE mmol/L 106 105 105  --  105 110* 107   CO2 mmol/L 29 28 28  --  29 26 28   CO2, I-STAT mmol/L  --   --   --  31  --   --   --    BUN mg/dL 31* 30* 26*  --  23 25 22   CREATININE mg/dL 1 48* 1 54* 1 62*  --  1 35* 1 28 1 34*   GLUCOSE, ISTAT mg/dl  --   --   --  123  --   --   --    CALCIUM mg/dL 8 9 8 8 9 1  --  9 1 8 9 9 7   AST U/L  --   --   --   --   --  26 27   ALT U/L  --   --   --   --   --  28 37   ALK PHOS U/L  --   --   --   --   --  60 74   EGFR ml/min/1 73sq m 34 33 31  --  38 41 39     BMP:  Results from last 7 days   Lab Units 11/24/22  0526 11/23/22  0515 11/22/22  0450 11/21/22  1432 11/21/22  0526 11/20/22  0606 11/19/22  1255   POTASSIUM mmol/L 3 7 3 8 4 0  --  3 7 4 3 4 7   CHLORIDE mmol/L 106 105 105  --  105 110* 107   CO2 mmol/L 29 28 28  --  29 26 28   CO2, I-STAT mmol/L  --   --   --  31  --   --   --    BUN mg/dL 31* 30* 26*  --  23 25 22   CREATININE mg/dL 1 48* 1 54* 1 62*  --  1 35* 1 28 1 34*   GLUCOSE, ISTAT mg/dl  --   --   --  123  --   --   --    CALCIUM mg/dL 8 9 8 8 9 1  --  9 1 8 9 9 7     Lab Results   Component Value Date    NTBN 1,717 (H) 11/19/2022    NTBN 1,351 (H) 03/09/2022    NTBN 517 (H) 01/10/2022      Results from last 7 days   Lab Units 11/22/22  0450   HEMOGLOBIN A1C % 5 1     Lipid Profile:   Lab Results   Component Value Date    CHOL 196 10/06/2017    CHOL 177 03/23/2015     Lab Results   Component Value Date    HDL 41 (L) 03/11/2022    HDL 61 01/17/2020    HDL 58 12/18/2018     Lab Results   Component Value Date    LDLCALC 66 03/11/2022    [facility] 95 01/17/2020    LDLCALC 81 12/18/2018     Lab Results   Component Value Date    TRIG 81 03/11/2022    TRIG 75 01/17/2020    TRIG 68 12/18/2018     Meds/Allergies   current meds:   Current Facility-Administered Medications   Medication Dose Route Frequency   • acetaminophen (TYLENOL) tablet 650 mg  650 mg Oral Q6H PRN   • albuterol (PROVENTIL HFA,VENTOLIN HFA) inhaler 2 puff  2 puff Inhalation Q6H PRN   • apixaban (ELIQUIS) tablet 5 mg  5 mg Oral BID   • bisacodyl (DULCOLAX) rectal suppository 10 mg  10 mg Rectal Once   • ceFAZolin (ANCEF) IVPB (premix in dextrose) 1,000 mg 50 mL  1,000 mg Intravenous Q8H   • furosemide (LASIX) tablet 40 mg  40 mg Oral Daily   • gabapentin (NEURONTIN) capsule 300 mg  300 mg Oral HS   • hydrocortisone 2 5 % cream   Topical TID   • hydroxychloroquine (PLAQUENIL) tablet 200 mg  200 mg Oral BID   • metoprolol tartrate (LOPRESSOR) tablet 25 mg  25 mg Oral Q12H FAMILIA   • nystatin (MYCOSTATIN) powder   Topical BID   • pantoprazole (PROTONIX) EC tablet 40 mg  40 mg Oral Early Morning   • polyethylene glycol (MIRALAX) packet 17 g  17 g Oral Daily   • predniSONE tablet 5 mg  5 mg Oral BID With Meals   • senna-docusate sodium (SENOKOT S) 8 6-50 mg per tablet 2 tablet  2 tablet Oral BID   • white petrolatum-mineral oil (EUCERIN,HYDROCERIN) cream   Topical 4x Daily PRN     Medications Prior to Admission   Medication   • acetaminophen (TYLENOL) 325 mg tablet   • albuterol (Ventolin HFA) 90 mcg/act inhaler   • alendronate (FOSAMAX) 70 mg tablet   • apixaban (ELIQUIS) 5 mg   • cholecalciferol (VITAMIN D3) 1,000 units tablet   • Clobetasol Propionate E 0 05 % emollient cream   • COVID-19 At-Home Test KIT   • gabapentin (Neurontin) 100 mg capsule • guaifenesin-codeine (GUAIFENESIN AC) 100-10 MG/5ML liquid   • hydrochlorothiazide (HYDRODIURIL) 12 5 mg tablet   • hydroxychloroquine (PLAQUENIL) 200 mg tablet   • ketoconazole (NIZORAL) 2 % cream   • metoprolol tartrate (LOPRESSOR) 25 mg tablet   • omeprazole (PriLOSEC) 20 mg delayed release capsule   • potassium chloride (K-DUR,KLOR-CON) 10 mEq tablet   • predniSONE 5 mg tablet   • triamcinolone (KENALOG) 0 1 % cream   • white petrolatum-mineral oil (EUCERIN,HYDROCERIN) cream     Assessment:  Principal Problem:    Acute diastolic heart failure (HCC)  Active Problems:    SIRS (systemic inflammatory response syndrome) (HCC)    Rheumatoid arthritis (HCC)    GERD (gastroesophageal reflux disease)    Sarcoid    Benign essential hypertension    Morbid obesity (HCC)    Lower extremity cellulitis    Paroxysmal atrial fibrillation (HCC)    Gram-positive cocci bacteremia    Hyperuricemia    Counseling / Coordination of Care  Total floor / unit time spent today 20 minutes  Greater than 50% of total time was spent with the patient and / or family counseling and / or coordination of care  ** Please Note: Dragon 360 Dictation voice to text software may have been used in the creation of this document   **

## 2022-11-25 NOTE — PROGRESS NOTES
Progress Note - Infectious Disease   Roman Patel 74 y o  female MRN: [id number]  Unit/Bed#: CW2 211-02 Encounter: [id number]      Impression/Recommendations:  1  Bilateral leg cellulitis   Source is most likely due extensive psoriatic rash   Findings of cellulitis on examination is somewhat equivocal   However, given increasing pain/tenderness, warmth and with unexplained fever, cellulitis is likely   Cellulitis has the appearance of streptococcal infection  Both edema and pain/tenderness much improved antibiotic, making cellulitis likely  Continue IV cefazolin  Serial leg exams  Monitor temperature/WBC  Transition to p o  Keflex in am, if patient continues to improve clinically  Treat x7 days total      2   Coagulase-negative Staphylococcus bacteremia, with growth in 1 out of 2 blood cultures   Patient remains systemically well, without evidence of sepsis or systemic toxicity   She has no indwelling intravascular foreign body   Patient has difficult blood draw on IV access   This is most likely contaminated blood draw  No antibiotic needed for this  Follow-up on 2nd concurrent blood culture      3  AMANDA, likely secondary to diuresis   Creatinine improving  Antibiotic at full dose for now  Monitor creatinine      4  Acute diastolic heart failure, with bilateral leg edema   Patient is currently on diuresis  Diuresis per primary service      5  RA, on outpatient hydroxychloroquine and low-dose prednisone  Continue outpatient treatment      6  PCN allergy, with rash  Patient is tolerating cefazolin well  Monitor for cross allergic reaction to PCN      Discussed with patient in detail regarding the above plan  Discussed with Dr Yuridia Jj slim service earlier      Antibiotics:  Cefazolin  Antibiotic # 3     Subjective:  Patient's leg pain improving every day  Temperature stays down  No chills  She is tolerating antibiotic well    No nausea, vomiting or diarrhea      Objective:  Vitals:  Temp:  [98 6 °F (37 °C)-99 3 °F (37 4 °C)] 98 6 °F (37 °C)  HR:  [73-90] 86  Resp:  [18] 18  BP: (105-139)/(55-60) 129/55  SpO2:  [99 %-100 %] 99 %  Temp (24hrs), Av 9 °F (37 2 °C), Min:98 6 °F (37 °C), Max:99 3 °F (37 4 °C)  Current: Temperature: 98 6 °F (37 °C)    Physical Exam:     General: Awake, alert, cooperative, no distress  Neck:  Supple  No mass  No lymphadenopathy  Lungs: Expansion symmetric, no rales, no wheezing, respirations unlabored  Heart:  Regular rate and rhythm, S1 and S2 normal, no murmur  Abdomen: Soft, nondistended, non-tender, bowel sounds active all four quadrants, no masses, no organomegaly  Extremities: Improving leg edema  Improving erythema/warmth  No draining ulcer  Improving tenderness  Skin:  Stable psoriatic rash  Neuro: Moves all extremities  Invasive Devices     Drain  Duration           External Urinary Catheter -- days                Labs studies:   I have personally reviewed pertinent labs  Results from last 7 days   Lab Units 22  0526 22  0515 22  0450 22  1432 22  0526 22  0606 22  1255   POTASSIUM mmol/L 3 7 3 8 4 0  --    < > 4 3 4 7   CHLORIDE mmol/L 106 105 105  --    < > 110* 107   CO2 mmol/L 29 28 28  --    < > 26 28   CO2, I-STAT mmol/L  --   --   --  31  --   --   --    BUN mg/dL 31* 30* 26*  --    < > 25 22   CREATININE mg/dL 1 48* 1 54* 1 62*  --    < > 1 28 1 34*   EGFR ml/min/1 73sq m 34 33 31  --    < > 41 39   GLUCOSE, ISTAT mg/dl  --   --   --  123  --   --   --    CALCIUM mg/dL 8 9 8 8 9 1  --    < > 8 9 9 7   AST U/L  --   --   --   --   --  26 27   ALT U/L  --   --   --   --   --  28 37   ALK PHOS U/L  --   --   --   --   --  60 74    < > = values in this interval not displayed       Results from last 7 days   Lab Units 22  0526 22  0515 22  0450   WBC Thousand/uL 8 27 8 78 9 94   HEMOGLOBIN g/dL 10 4* 10 6* 11 4*   PLATELETS Thousands/uL 223 209 216     Results from last 7 days   Lab Units 11/24/22  0528 11/22/22  0116   BLOOD CULTURE  No Growth at 24 hrs  No Growth at 24 hrs  No Growth at 72 hrs  Staphylococcus coagulase negative*   GRAM STAIN RESULT   --  Gram positive cocci in clusters*       Imaging Studies:   I have personally reviewed pertinent imaging study reports and images in PACS  EKG, Pathology, and Other Studies:   I have personally reviewed pertinent reports

## 2022-11-25 NOTE — PLAN OF CARE
Problem: Potential for Falls  Goal: Patient will remain free of falls  Description: INTERVENTIONS:  - Educate patient/family on patient safety including physical limitations  - Instruct patient to call for assistance with activity   - Consult OT/PT to assist with strengthening/mobility   - Keep Call bell within reach  - Keep bed low and locked with side rails adjusted as appropriate  - Keep care items and personal belongings within reach  - Initiate and maintain comfort rounds  - Make Fall Risk Sign visible to staff  - Offer Toileting every  Hours, in advance of need  - Initiate/Maintain alarm  - Obtain necessary fall risk management equipment:   - Apply yellow socks and bracelet for high fall risk patients  - Consider moving patient to room near nurses station  11/25/2022 1134 by Zipporah Denver, RN  Outcome: Adequate for Discharge  11/25/2022 1130 by Zipporah Denver, RN  Outcome: Progressing     Problem: Prexisting or High Potential for Compromised Skin Integrity  Goal: Skin integrity is maintained or improved  Description: INTERVENTIONS:  - Identify patients at risk for skin breakdown  - Assess and monitor skin integrity  - Assess and monitor nutrition and hydration status  - Monitor labs   - Assess for incontinence   - Turn and reposition patient  - Assist with mobility/ambulation  - Relieve pressure over bony prominences  - Avoid friction and shearing  - Provide appropriate hygiene as needed including keeping skin clean and dry  - Evaluate need for skin moisturizer/barrier cream  - Collaborate with interdisciplinary team   - Patient/family teaching  - Consider wound care consult   11/25/2022 1134 by Zipporah Denver, RN  Outcome: Adequate for Discharge  11/25/2022 1130 by Zipporah Denver, RN  Outcome: Progressing     Problem: MOBILITY - ADULT  Goal: Maintain or return to baseline ADL function  Description: INTERVENTIONS:  -  Assess patient's ability to carry out ADLs; assess patient's baseline for ADL function and identify physical deficits which impact ability to perform ADLs (bathing, care of mouth/teeth, toileting, grooming, dressing, etc )  - Assess/evaluate cause of self-care deficits   - Assess range of motion  - Assess patient's mobility; develop plan if impaired  - Assess patient's need for assistive devices and provide as appropriate  - Encourage maximum independence but intervene and supervise when necessary  - Involve family in performance of ADLs  - Assess for home care needs following discharge   - Consider OT consult to assist with ADL evaluation and planning for discharge  - Provide patient education as appropriate  11/25/2022 1134 by Amy Varma RN  Outcome: Adequate for Discharge  11/25/2022 1130 by Amy Varma RN  Outcome: Progressing  Goal: Maintains/Returns to pre admission functional level  Description: INTERVENTIONS:  - Perform BMAT or MOVE assessment daily    - Set and communicate daily mobility goal to care team and patient/family/caregiver  - Collaborate with rehabilitation services on mobility goals if consulted  - Perform Range of Motion  times a day  - Reposition patient every  hours    - Dangle patien times a day  - Stand patient  times a day  - Ambulate patient  times a day  - Out of bed to chair  times a day   - Out of bed for meals  times a day  - Out of bed for toileting  - Record patient progress and toleration of activity level   11/25/2022 1134 by Amy Varma RN  Outcome: Adequate for Discharge  11/25/2022 1130 by Amy Varma RN  Outcome: Progressing

## 2022-11-25 NOTE — ASSESSMENT & PLAN NOTE
· Patient with history of psoriasis now with bilateral lower extremity erythema likely with cellulitis  · Received IV cefazolin, infectious Disease input noted transition to p o   Keflex to complete 7 day course of antibiotics

## 2022-11-25 NOTE — CASE MANAGEMENT
Case Management Assessment & Discharge Planning Note    Patient name Carter Fink CW2 /CW2 211-02 MRN [id number]  : 1950 Date 2022       Current Admission Date: 2022  Current Admission Diagnosis:Acute diastolic heart failure [facility])   Patient Active Problem List    Diagnosis Date Noted   • Hyperuricemia 2022   • Acute diastolic heart failure ([facility] ) 2022   • Acute bronchitis 2022   • Assistance needed with transportation 09/15/2022   • Abnormal CT of the chest 2022   • Gram-positive cocci bacteremia 2022   • Pustular psoriasis 2022   • Elevated troponin 2022   • COVID-19 2022   • Paroxysmal atrial fibrillation ([facility] ) 01/10/2022   • Hyperparathyroidism ([facility] ) 2021   • Murmur, cardiac 2021   • BPPV (benign paroxysmal positional vertigo) 2018   • Seasonal allergic rhinitis due to pollen 2018   • Lower extremity cellulitis 10/27/2017   • Osteoporosis 2016   • SIRS (systemic inflammatory response syndrome) ([facility] ) 2016   • Rheumatoid arthritis ([facility] ) 2016   • GERD (gastroesophageal reflux disease) 2016   • Sarcoid 2016   • Morbid obesity ([facility] ) 2016   • Vitamin D deficiency 2015   • Benign essential hypertension 2014   • Lumbar radiculopathy 2014      LOS (days): 5  Geometric Mean LOS (GMLOS) (days): 3 90  Days to GMLOS:-1 1     OBJECTIVE:    Risk of Unplanned Readmission Score: 25 52         Current admission status: Inpatient       Preferred Pharmacy:   [address] [place] [address] #[id number] [address]  [address] [address]  Phone: [phone] Fax: [phone] [address] [address]  Phone: [phone] Fax: [phone]    Primary Care Provider: Linda Butler DO    Primary Insurance: [facility]  Secondary Insurance: ASSESSMENT:  Active Health Care Proxies    There are no active Health Care Proxies on file  DISCHARGE DETAILS:                     Dispatcher Contacted: Yes  Number/Name of Dispatcher: PO/[phone]     ETA of Transport (Date): 11/25/22  ETA of Transport (Time): 1400     Transfer Mode: Stretcher        IMM Given (Date):: 11/25/22  IMM Given to[de-identified] Patient     Additional Comments: Patient cleared for discharge today to [facility] 2021 for rehab  Transportation arranged and confirmed via [address] at 1400  Patient, daughter, nursing, provider, facility aware            Facility/Agency Fax Number: [phone]

## 2022-11-27 LAB — BACTERIA BLD CULT: NORMAL

## 2022-11-28 NOTE — UTILIZATION REVIEW
NOTIFICATION OF ADMISSION DISCHARGE   This is a Notification of Discharge from [address]  Please be advised that this patient has been discharge from our facility  Below you will find the admission and discharge date and time including the patient’s disposition  UTILIZATION REVIEW CONTACT:  Alexander Ford  Utilization   Network Utilization Review Department  Phone: [phone] x carefully listen to the prompts  All voicemails are confidential   Email: [email or web address]     ADMISSION INFORMATION  PRESENTATION DATE: 11/19/2022 11:38 AM  OBERVATION ADMISSION DATE:  INPATIENT ADMISSION DATE: 11/20/22 12:49 PM   DISCHARGE DATE: 11/25/2022  2:21 PM   DISPOSITION:[facility] SN[facility]/TCU/SNU    IMPORTANT INFORMATION:  Send all requests for admission clinical reviews, approved or denied determinations and any other requests to dedicated fax number below belonging to the campus where the patient is receiving treatment   List of dedicated fax numbers:  [address] DENIALS (Administrative/Medical Necessity) [phone]   [address] (Maternity/NICU/Pediatrics) [phone]   [facility] [phone]   [facility] 87 [phone]   [address] [phone]   220 [facility] [phone]   90 [facility] [phone]   [address] [place] [address] 119 [phone]   [facility]  [phone]   [address] [facility] [phone] 412 [facility] [address] [facility] [phone]

## 2022-11-29 LAB
BACTERIA BLD CULT: NORMAL
BACTERIA BLD CULT: NORMAL

## 2022-12-07 NOTE — PROGRESS NOTES
Cardiology Follow Up    [facility]  1950  [id number]  [address] [facility] [place]  [facility] [id number] [phone]    No diagnosis found      Interval History: ***    Patient Active Problem List   Diagnosis   • SIRS (systemic inflammatory response syndrome) ([facility])   • Rheumatoid arthritis ([address] )   • GERD (gastroesophageal reflux disease)   • Sarcoid   • Benign essential hypertension   • Morbid obesity ([address] )   • Lumbar radiculopathy   • Osteoporosis   • Vitamin D deficiency   • Lower extremity cellulitis   • BPPV (benign paroxysmal positional vertigo)   • Seasonal allergic rhinitis due to pollen   • Hyperparathyroidism ([address] )   • Murmur, cardiac   • Paroxysmal atrial fibrillation ([facility])   • COVID-19   • Elevated troponin   • Gram-positive cocci bacteremia   • Pustular psoriasis   • Abnormal CT of the chest   • Assistance needed with transportation   • Acute bronchitis   • Acute diastolic heart failure ([address] )   • Hyperuricemia     Past Medical History:   Diagnosis Date   • Abnormal thyroid function test     last assessed: Sept 25, 2015    • Arthritis    • Caries     last assessed: Sept 9, 2016    • Continuous opioid dependence ([address] ) 12/2/2021   • Edema of right lower extremity     last assessed: March 18, 2015    • GERD (gastroesophageal reflux disease)    • Hypertension    • Medicare annual wellness visit, subsequent 12/2/2021   • Positive blood culture 3/11/2022   • Sarcoid      Social History     Socioeconomic History   • Marital status: Single     Spouse name: Not on file   • Number of children: Not on file   • Years of education: Not on file   • Highest education level: Not on file   Occupational History   • Occupation: Retired: Worked for [employer]    Tobacco Use   • Smoking status: Never   • Smokeless tobacco: Never   Vaping Use   • Vaping Use: Never used   Substance and Sexual Activity   • Alcohol use: No   • Drug use: No   • Sexual activity: Not Currently   Other Topics Concern   • Not on file   Social History Narrative     noted in "allscripts"      Social Determinants of Health     Financial Resource Strain: Not on file   Food Insecurity: No Food Insecurity   • Worried About Running Out of Food in the Last Year: Never true   • Ran Out of Food in the Last Year: Never true   Transportation Needs: No Transportation Needs   • Lack of Transportation (Medical): No   • Lack of Transportation (Non-Medical): No   Physical Activity: Not on file   Stress: Not on file   Social Connections: Not on file   Intimate Partner Violence: Not on file   Housing Stability: Low Risk    • Unable to Pay for Housing in the Last Year: No   • Number of Places Lived in the Last Year: 1   • Unstable Housing in the Last Year: No      Family History   Problem Relation Age of Onset   • Asthma Mother    • Stroke Mother    • No Known Problems Father    • No Known Problems Sister    • No Known Problems Brother    • No Known Problems Maternal Grandmother    • No Known Problems Maternal Grandfather    • No Known Problems Paternal Grandmother    • No Known Problems Paternal Grandfather    • Diabetes Maternal Aunt    • Breast cancer Cousin    • Diabetes Cousin    • Breast cancer Other      Past Surgical History:   Procedure Laterality Date   •  SECTION     • MULTIPLE TOOTH EXTRACTIONS N/A 2016    Procedure: Surgical extraction of teeth 2, 18, 23, 27, 32; incision and drainage of left subperiosteal abscess ;  Surgeon: Antoni Magana DMD;  Location:  MAIN OR;  Service:        Current Outpatient Medications:   •  acetaminophen (TYLENOL) 325 mg tablet, Take 2 tablets (650 mg total) by mouth every 4 (four) hours as needed for mild pain, headaches or fever  , Disp: 30 tablet, Rfl: 0  •  albuterol (Ventolin HFA) 90 mcg/act inhaler, Inhale 2 puffs every 6 (six) hours as needed for wheezing, Disp: 18 g, Rfl: 0  •  alendronate (FOSAMAX) 70 mg tablet, Take by mouth every 7 days , Disp: , Rfl:   •  apixaban (ELIQUIS) 5 mg, Take 1 tablet (5 mg total) by mouth 2 (two) times a day, Disp: 60 tablet, Rfl: 0  •  cholecalciferol (VITAMIN D3) 1,000 units tablet, Take 1 tablet (1,000 Units total) by mouth daily, Disp: 90 tablet, Rfl: 0  •  Clobetasol Propionate E 0 05 % emollient cream, APPLY TWICE A DAY FOR PALM SKIN DERMITITS, Disp: , Rfl:   •  furosemide (LASIX) 40 mg tablet, Take 1 tablet (40 mg total) by mouth daily Do not start before November 26, 2022 , Disp: 30 tablet, Rfl: 0  •  gabapentin (Neurontin) 100 mg capsule, Take 1 capsule in AM and 3 capsules in PM, Disp: 120 capsule, Rfl: 1  •  guaifenesin-codeine (GUAIFENESIN AC) 100-10 MG/5ML liquid, Take 5 mL by mouth 3 (three) times a day as needed for cough, Disp: 180 mL, Rfl: 0  •  hydroxychloroquine (PLAQUENIL) 200 mg tablet, Take 1 tablet (200 mg total) by mouth 2 (two) times a day, Disp: 60 tablet, Rfl: 0  •  ketoconazole (NIZORAL) 2 % cream, Apply topically 2 (two) times a day, Disp: 60 g, Rfl: 0  •  metoprolol tartrate (LOPRESSOR) 25 mg tablet, Take 1 tablet (25 mg total) by mouth every 12 (twelve) hours, Disp: 180 tablet, Rfl: 0  •  nystatin (MYCOSTATIN) powder, Apply topically 2 (two) times a day, Disp: 15 g, Rfl: 0  •  omeprazole (PriLOSEC) 20 mg delayed release capsule, Take 1 capsule (20 mg total) by mouth daily, Disp: 90 capsule, Rfl: 3  •  polyethylene glycol (MIRALAX) 17 g packet, Take 17 g by mouth daily Do not start before November 26, 2022 , Disp: 510 g, Rfl: 0  •  potassium chloride (K-DUR,KLOR-CON) 10 mEq tablet, Take 1 tablet (10 mEq total) by mouth daily, Disp: 90 tablet, Rfl: 0  •  predniSONE 5 mg tablet, Take 1 tablet (5 mg total) by mouth 2 (two) times a day with meals, Disp: 60 tablet, Rfl: 0  •  senna-docusate sodium (SENOKOT S) 8 6-50 mg per tablet, Take 1 tablet by mouth daily, Disp: 30 tablet, Rfl: 0  •  triamcinolone (KENALOG) 0 1 % cream, Apply 1 application topically 2 (two) times a day for 14 days To affected area, Disp: 28 g, Rfl: 0  •  white petrolatum-mineral oil (EUCERIN,HYDROCERIN) cream, Apply topically 3 (three) times a day, Disp: 454 g, Rfl: 0  Allergies   Allergen Reactions   • Methotrexate Derivatives    • Amoxicillin Rash   • Ampicillin-Sulbactam Sodium Rash   • Shellfish-Derived Products - Food Allergy Itching       Labs:  No results displayed because visit has over 200 results  Imaging: XR chest portable    Result Date: 11/22/2022  Narrative: CHEST INDICATION:  Leg swelling  COMPARISON:  11/19/2022 EXAM PERFORMED/VIEWS:  XR CHEST PORTABLE FINDINGS: Cardiomediastinal silhouette appears unremarkable  Stable platelike atelectasis on the right  Lungs are otherwise clear though evaluation is limited by shallow inspiration with crowding of the lung markings     No pneumothorax or pleural effusion  Osseous structures appear within normal limits for patient age  Impression: No active pulmonary disease on examination which is somewhat limited secondary to low lung volumes  Workstation performed: [id number]     XR chest 1 view portable    Result Date: 11/19/2022  Narrative: CHEST INDICATION:   fall  COMPARISON:  Chest radiograph March 9, 2022 and CT chest July 10, 2022  EXAM PERFORMED/VIEWS:  XR CHEST PORTABLE FINDINGS: The heart is mildly enlarged  No focal area of consolidation  There is mild pulmonary vascular congestion and very minimal interstitial edema  There is trace fluid in the minor fissure  No sizable pleural effusions  Osseous structures appear within normal limits for patient age  Impression: Minimal CHF  Workstation performed: [id number]     XR knee 4+ views Right injury    Result Date: 11/19/2022  Narrative: RIGHT KNEE INDICATION:   injury  COMPARISON:  None VIEWS:  XR KNEE 4+ VW RIGHT INJURY FINDINGS: There is no acute fracture or dislocation  There is no joint effusion   There are marked osteoarthritic changes predominantly in the medial femorotibial joint and patellofemoral joint with extensive joint space narrowing and osteophyte formation  No lytic or blastic osseous lesion  The bones are diffusely demineralized  Soft tissues are unremarkable  Impression: Osteoarthritis with no acute osseous abnormality  Workstation performed: [id number]     XR tibia fibula 2 views RIGHT    Result Date: 11/19/2022  Narrative: RIGHT TIBIA AND FIBULA INDICATION:   injury  COMPARISON:  None VIEWS:  XR TIBIA FIBULA 2 VW RIGHT FINDINGS: There is no acute fracture or dislocation  Degenerative changes of the knee reported separately with knee images  No lytic or blastic osseous lesion  The bones are diffusely demineralized  Soft tissues are unremarkable  Impression: No acute osseous abnormality  Workstation performed: [id number]     CT head without contrast    Result Date: 11/19/2022  Narrative: CT BRAIN - WITHOUT CONTRAST INDICATION:   fall yesterday,no head strike  COMPARISON:  Brain CT from 7/10/2022  TECHNIQUE:  CT examination of the brain was performed  In addition to axial images, sagittal and coronal 2D reformatted images were created and submitted for interpretation  Radiation dose length product (DLP) for this visit:  874 97 mGy-cm   This examination, like all CT scans performed in the [facility], was performed utilizing techniques to minimize radiation dose exposure, including the use of iterative  reconstruction and automated exposure control  IMAGE QUALITY:  Diagnostic  FINDINGS: PARENCHYMA: Decreased attenuation is noted in periventricular and subcortical white matter demonstrating an appearance that is statistically most likely to represent mild microangiopathic change; this appearance is similar when compared to most recent prior examination  No CT signs of acute infarction  No intracranial mass, mass effect or midline shift  No acute parenchymal hemorrhage  VENTRICLES AND EXTRA-AXIAL SPACES:  Normal for the patient's age  VISUALIZED ORBITS AND PARANASAL SINUSES:  Air-fluid level in the right maxillary sinus, which could represent acute sinusitis (series 3 image 2 ) CALVARIUM AND EXTRACRANIAL SOFT TISSUES:  Normal      Impression: No acute intracranial abnormality  Chronic microangiopathic changes  Air-fluid level in the right maxillary sinus, which could represent acute sinusitis (series 3 image 2 ) Workstation performed: [id number]     VAS lower limb venous duplex study, complete bilateral    Result Date: 11/19/2022  Narrative:  [facility] REPORT CLINICAL: Indications: Patient presents with bilateral lower extremity pain and swelling  Operative History: No Cardiovascular Surgeries Risk Factors The patient has history of HTN  CONCLUSION:  Impression: RIGHT LOWER LIMB: No evidence of acute or chronic deep vein thrombosis  No evidence of superficial thrombophlebitis noted  Doppler evaluation shows a normal response to augmentation maneuvers  Popliteal, posterior tibial and anterior tibial arterial Doppler waveforms are triphasic  LEFT LOWER LIMB: No evidence of acute or chronic deep vein thrombosis  No evidence of superficial thrombophlebitis noted  Doppler evaluation shows a normal response to augmentation maneuvers  Popliteal, posterior tibial and anterior tibial arterial Doppler waveforms are triphasic  Technically difficult/limited study  Technical findings were given to Dr Eitan Anthony  SIGNATURE: Electronically Signed by: Cuong Recinos MD on 2022-11-19 09:31:48 PM    Echo complete w/ contrast if indicated    Result Date: 11/21/2022  Narrative: •  Left Ventricle: Left ventricular cavity size is normal  Wall thickness is mildly increased  The left ventricular ejection fraction is 55%  Systolic function is normal  Wall motion is normal  Diastolic function is moderately abnormal, consistent with grade II (pseudonormal) relaxation   •  Right Ventricle: Right ventricular cavity size is normal  Systolic function is normal  • Left Atrium: The atrium is mildly dilated  •  Mitral Valve: There is trace regurgitation  •  Prior TTE study available for comparison  Prior study date: 1/10/2022  No significant changes noted compared to the prior study         Review of Systems:  Review of Systems    Physical Exam:  Physical Exam    Discussion/Summary:***

## 2022-12-08 ENCOUNTER — TELEMEDICINE (OUTPATIENT)
Dept: CARDIOLOGY CLINIC | Facility: CLINIC | Age: 72
End: 2022-12-08

## 2022-12-08 VITALS
HEIGHT: 63 IN | DIASTOLIC BLOOD PRESSURE: 65 MMHG | BODY MASS INDEX: 47.06 KG/M2 | OXYGEN SATURATION: 93 % | WEIGHT: 265.6 LBS | HEART RATE: 70 BPM | SYSTOLIC BLOOD PRESSURE: 127 MMHG

## 2022-12-08 DIAGNOSIS — I48.91 ATRIAL FIBRILLATION, UNSPECIFIED TYPE (HCC): ICD-10-CM

## 2022-12-08 DIAGNOSIS — I50.32 CHRONIC DIASTOLIC HEART FAILURE (HCC): Primary | ICD-10-CM

## 2022-12-08 DIAGNOSIS — I10 HYPERTENSION, UNSPECIFIED TYPE: ICD-10-CM

## 2022-12-08 NOTE — PATIENT INSTRUCTIONS
Maintain a 2 gram daily sodium diet and 1500 ml daily fluid restriction  Check daily weights  If you gained 3 pounds in one day, 5 pounds in one week, or experience worsening shortness of breath or increasing lower leg swelling  Please call the heart failure office at [phone]    Please bring a  list of your current medications and daily weights to the office visit

## 2022-12-08 NOTE — PROGRESS NOTES
Virtual Brief Visit    Patient is located in the following state in which I hold an active license [place]    Ms Heron Goncalves was admitted to [facility] on 11/19- 11/25/22 with acute diastolic heart failure  Nevaeh Canaad presented to the emergency room with lower extremity swelling  She was found to be in acute diastolic heart failure  Cardiology consulted  TTE: Ventricle cavity size normal wall thickness mildly increased LVEF 63% diastolic function mildly abnormal consistent with grade 2 pseudo normal relaxation  Right ventricular size systolic function normal   Left atrium mildly dilated  Trace mitral valve regurgitation  Trace tricuspid valve regurgitation  She was diuresed with IV Lasix and transition to Lasix 40 mg p o  Admission she met SIRS criteria which was felt to be due to lower extremity cellulitis from a psoriatic lesion  She was treated with IV antibiotics and transition to p o  Keflex for 7 days at discharge  Blood cultures were positive for Staph aureus coagulase-negative likely contaminant  Discharged to [address] [place] [address] in [place]  Today's office visit conducted via telephone  I have spoke with her nurse Courtney Nolen who informs me Shavonne Wilkins is doing well  She continues with slight lower extremity edema  Her weight today was 265 pounds  She is on a 2 g sodium diet abiding by fluid restriction  She is undergoing rehabilitation  Was unable to contact Shavonne Wilkins directly  11/30/22 sodium 143, potassium 3 9, BUN 20, creatinine 1 07, GFR 55, albumin 2 4  Assessment/Plan:  1 Chronic HFpEF LVEF 55%, Stage C- weight 265 pounds  HR and BP controlled, Continues with ghanshyam LE edema that is improving  Continue on 640 mg daily, K-Dur 10 mEq daily, Metoprolol tartrate 25 mg every 12 hours, grams sodium diet 1500 cc fluid restriction Daily weights, bilateral lower extremity compression stockings to be worn during the day removed at bedtime  2   Atrial fibrillation [id number]= 4 ( Age, female, HTN, CHF) continue metoprolol tartrate 25 mg every 12 hours, Eliquis 5 mg twice daily for stroke prevention  3  Hypertension controlled on Metoprol tartrate 25 mg every 12 hours, 2 g sodium diet  Problem List Items Addressed This Visit    None      Recent Visits  No visits were found meeting these conditions  Showing recent visits within past 7 days and meeting all other requirements  Today's Visits  Date Type Provider Dept   12/08/22 3073 [facility], [address] today's visits and meeting all other requirements  Future Appointments  No visits were found meeting these conditions    Showing future appointments within next 150 days and meeting all other requirements         Visit Time    Visit Start Time: 1:40 pm Visit Stop Time: 1:55pm   Total Visit Duration: 15 minutes

## 2022-12-13 ENCOUNTER — VBI (OUTPATIENT)
Dept: ADMINISTRATIVE | Facility: OTHER | Age: 72
End: 2022-12-13

## 2022-12-13 ENCOUNTER — HOSPITAL ENCOUNTER (INPATIENT)
Facility: HOSPITAL | Age: 72
LOS: 6 days | Discharge: NURSING, REHAB OR LONG-TERM CARE | End: 2022-12-19
Attending: EMERGENCY MEDICINE | Admitting: INTERNAL MEDICINE

## 2022-12-13 DIAGNOSIS — I50.31 ACUTE DIASTOLIC HEART FAILURE (HCC): ICD-10-CM

## 2022-12-13 DIAGNOSIS — R26.2 AMBULATORY DYSFUNCTION: Primary | ICD-10-CM

## 2022-12-13 DIAGNOSIS — E66.01 MORBID OBESITY (HCC): ICD-10-CM

## 2022-12-13 RX ORDER — MAGNESIUM HYDROXIDE/ALUMINUM HYDROXICE/SIMETHICONE 120; 1200; 1200 MG/30ML; MG/30ML; MG/30ML
30 SUSPENSION ORAL EVERY 6 HOURS PRN
Status: DISCONTINUED | OUTPATIENT
Start: 2022-12-13 | End: 2022-12-20 | Stop reason: HOSPADM

## 2022-12-13 RX ORDER — GABAPENTIN 100 MG/1
100 CAPSULE ORAL DAILY
Status: DISCONTINUED | OUTPATIENT
Start: 2022-12-14 | End: 2022-12-20 | Stop reason: HOSPADM

## 2022-12-13 RX ORDER — POTASSIUM CHLORIDE 750 MG/1
10 TABLET, EXTENDED RELEASE ORAL DAILY
Status: DISCONTINUED | OUTPATIENT
Start: 2022-12-14 | End: 2022-12-20 | Stop reason: HOSPADM

## 2022-12-13 RX ORDER — CLOBETASOL PROPIONATE 0.5 MG/G
1 CREAM TOPICAL 2 TIMES DAILY
Status: DISCONTINUED | OUTPATIENT
Start: 2022-12-14 | End: 2022-12-20 | Stop reason: HOSPADM

## 2022-12-13 RX ORDER — ALBUTEROL SULFATE 90 UG/1
2 AEROSOL, METERED RESPIRATORY (INHALATION) EVERY 6 HOURS PRN
Status: DISCONTINUED | OUTPATIENT
Start: 2022-12-13 | End: 2022-12-20 | Stop reason: HOSPADM

## 2022-12-13 RX ORDER — CLOTRIMAZOLE 1 %
CREAM (GRAM) TOPICAL 2 TIMES DAILY
Status: DISCONTINUED | OUTPATIENT
Start: 2022-12-14 | End: 2022-12-20 | Stop reason: HOSPADM

## 2022-12-13 RX ORDER — AMOXICILLIN 250 MG
1 CAPSULE ORAL DAILY
Status: DISCONTINUED | OUTPATIENT
Start: 2022-12-14 | End: 2022-12-20 | Stop reason: HOSPADM

## 2022-12-13 RX ORDER — FUROSEMIDE 40 MG/1
40 TABLET ORAL DAILY
Status: DISCONTINUED | OUTPATIENT
Start: 2022-12-14 | End: 2022-12-20 | Stop reason: HOSPADM

## 2022-12-13 RX ORDER — POLYETHYLENE GLYCOL 3350 17 G/17G
17 POWDER, FOR SOLUTION ORAL DAILY
Status: DISCONTINUED | OUTPATIENT
Start: 2022-12-14 | End: 2022-12-20 | Stop reason: HOSPADM

## 2022-12-13 RX ORDER — PREDNISONE 1 MG/1
5 TABLET ORAL 2 TIMES DAILY WITH MEALS
Status: DISCONTINUED | OUTPATIENT
Start: 2022-12-14 | End: 2022-12-20 | Stop reason: HOSPADM

## 2022-12-13 RX ORDER — PANTOPRAZOLE SODIUM 20 MG/1
20 TABLET, DELAYED RELEASE ORAL
Status: DISCONTINUED | OUTPATIENT
Start: 2022-12-14 | End: 2022-12-20 | Stop reason: HOSPADM

## 2022-12-13 RX ORDER — GUAIFENESIN/DEXTROMETHORPHAN 100-10MG/5
5 SYRUP ORAL EVERY 6 HOURS PRN
Status: DISCONTINUED | OUTPATIENT
Start: 2022-12-13 | End: 2022-12-20 | Stop reason: HOSPADM

## 2022-12-13 RX ORDER — MELATONIN
1000 DAILY
Status: DISCONTINUED | OUTPATIENT
Start: 2022-12-14 | End: 2022-12-20 | Stop reason: HOSPADM

## 2022-12-13 RX ORDER — LANOLIN ALCOHOL/MO/W.PET/CERES
CREAM (GRAM) TOPICAL 3 TIMES DAILY
Status: DISCONTINUED | OUTPATIENT
Start: 2022-12-13 | End: 2022-12-20 | Stop reason: HOSPADM

## 2022-12-13 RX ORDER — ONDANSETRON 2 MG/ML
4 INJECTION INTRAMUSCULAR; INTRAVENOUS EVERY 6 HOURS PRN
Status: DISCONTINUED | OUTPATIENT
Start: 2022-12-13 | End: 2022-12-20 | Stop reason: HOSPADM

## 2022-12-13 RX ORDER — HYDROXYCHLOROQUINE SULFATE 200 MG/1
200 TABLET, FILM COATED ORAL 2 TIMES DAILY
Status: DISCONTINUED | OUTPATIENT
Start: 2022-12-14 | End: 2022-12-20 | Stop reason: HOSPADM

## 2022-12-13 RX ORDER — NYSTATIN 100000 [USP'U]/G
POWDER TOPICAL 2 TIMES DAILY
Status: DISCONTINUED | OUTPATIENT
Start: 2022-12-14 | End: 2022-12-20 | Stop reason: HOSPADM

## 2022-12-13 RX ORDER — ACETAMINOPHEN 325 MG/1
650 TABLET ORAL EVERY 6 HOURS PRN
Status: DISCONTINUED | OUTPATIENT
Start: 2022-12-13 | End: 2022-12-20 | Stop reason: HOSPADM

## 2022-12-13 RX ORDER — HEPARIN SODIUM 5000 [USP'U]/ML
5000 INJECTION, SOLUTION INTRAVENOUS; SUBCUTANEOUS EVERY 8 HOURS SCHEDULED
Status: DISCONTINUED | OUTPATIENT
Start: 2022-12-13 | End: 2022-12-13 | Stop reason: ALTCHOICE

## 2022-12-13 NOTE — TELEPHONE ENCOUNTER
12/13/22 10:41 AM     [employer] SmartForm used to document caregap status      Ivette Montemayor MA

## 2022-12-14 PROBLEM — R82.90 ABNORMAL URINALYSIS: Status: ACTIVE | Noted: 2022-12-14

## 2022-12-14 PROBLEM — R82.71 ASYMPTOMATIC BACTERIURIA: Status: ACTIVE | Noted: 2022-12-14

## 2022-12-14 LAB
ALBUMIN SERPL BCP-MCNC: 2.6 G/DL (ref 3.5–5)
ALP SERPL-CCNC: 55 U/L (ref 46–116)
ALT SERPL W P-5'-P-CCNC: 19 U/L (ref 12–78)
ANION GAP SERPL CALCULATED.3IONS-SCNC: 7 MMOL/L (ref 4–13)
AST SERPL W P-5'-P-CCNC: 21 U/L (ref 5–45)
BACTERIA UR QL AUTO: ABNORMAL /HPF
BASOPHILS # BLD AUTO: 0.04 THOUSANDS/ÂΜL (ref 0–0.1)
BASOPHILS NFR BLD AUTO: 1 % (ref 0–1)
BILIRUB SERPL-MCNC: 0.56 MG/DL (ref 0.2–1)
BILIRUB UR QL STRIP: NEGATIVE
BUN SERPL-MCNC: 21 MG/DL (ref 5–25)
CALCIUM ALBUM COR SERPL-MCNC: 10.4 MG/DL (ref 8.3–10.1)
CALCIUM SERPL-MCNC: 9.3 MG/DL (ref 8.3–10.1)
CHLORIDE SERPL-SCNC: 112 MMOL/L (ref 96–108)
CLARITY UR: CLEAR
CO2 SERPL-SCNC: 26 MMOL/L (ref 21–32)
COLOR UR: YELLOW
CREAT SERPL-MCNC: 1.07 MG/DL (ref 0.6–1.3)
EOSINOPHIL # BLD AUTO: 0.33 THOUSAND/ÂΜL (ref 0–0.61)
EOSINOPHIL NFR BLD AUTO: 5 % (ref 0–6)
ERYTHROCYTE [DISTWIDTH] IN BLOOD BY AUTOMATED COUNT: 13.9 % (ref 11.6–15.1)
GFR SERPL CREATININE-BSD FRML MDRD: 51 ML/MIN/1.73SQ M
GLUCOSE SERPL-MCNC: 89 MG/DL (ref 65–140)
GLUCOSE UR STRIP-MCNC: NEGATIVE MG/DL
HCT VFR BLD AUTO: 40.7 % (ref 34.8–46.1)
HGB BLD-MCNC: 12.4 G/DL (ref 11.5–15.4)
HGB UR QL STRIP.AUTO: ABNORMAL
IMM GRANULOCYTES # BLD AUTO: 0.03 THOUSAND/UL (ref 0–0.2)
IMM GRANULOCYTES NFR BLD AUTO: 1 % (ref 0–2)
KETONES UR STRIP-MCNC: NEGATIVE MG/DL
LEUKOCYTE ESTERASE UR QL STRIP: ABNORMAL
LYMPHOCYTES # BLD AUTO: 1.17 THOUSANDS/ÂΜL (ref 0.6–4.47)
LYMPHOCYTES NFR BLD AUTO: 18 % (ref 14–44)
MAGNESIUM SERPL-MCNC: 2 MG/DL (ref 1.6–2.6)
MCH RBC QN AUTO: 27.9 PG (ref 26.8–34.3)
MCHC RBC AUTO-ENTMCNC: 30.5 G/DL (ref 31.4–37.4)
MCV RBC AUTO: 92 FL (ref 82–98)
MONOCYTES # BLD AUTO: 0.68 THOUSAND/ÂΜL (ref 0.17–1.22)
MONOCYTES NFR BLD AUTO: 11 % (ref 4–12)
NEUTROPHILS # BLD AUTO: 4.16 THOUSANDS/ÂΜL (ref 1.85–7.62)
NEUTS SEG NFR BLD AUTO: 64 % (ref 43–75)
NITRITE UR QL STRIP: NEGATIVE
NON-SQ EPI CELLS URNS QL MICRO: ABNORMAL /HPF
NRBC BLD AUTO-RTO: 0 /100 WBCS
PH UR STRIP.AUTO: 5.5 [PH]
PHOSPHATE SERPL-MCNC: 2.7 MG/DL (ref 2.3–4.1)
PLATELET # BLD AUTO: 186 THOUSANDS/UL (ref 149–390)
PLATELET # BLD AUTO: 188 THOUSANDS/UL (ref 149–390)
PMV BLD AUTO: 11.6 FL (ref 8.9–12.7)
PMV BLD AUTO: 11.7 FL (ref 8.9–12.7)
POTASSIUM SERPL-SCNC: 3.5 MMOL/L (ref 3.5–5.3)
PROT SERPL-MCNC: 7.2 G/DL (ref 6.4–8.4)
PROT UR STRIP-MCNC: ABNORMAL MG/DL
RBC # BLD AUTO: 4.45 MILLION/UL (ref 3.81–5.12)
RBC #/AREA URNS AUTO: ABNORMAL /HPF
SODIUM SERPL-SCNC: 145 MMOL/L (ref 135–147)
SP GR UR STRIP.AUTO: 1.02 (ref 1–1.03)
TSH SERPL DL<=0.05 MIU/L-ACNC: 3.6 UIU/ML (ref 0.45–4.5)
UROBILINOGEN UR STRIP-ACNC: <2 MG/DL
WBC # BLD AUTO: 6.41 THOUSAND/UL (ref 4.31–10.16)
WBC #/AREA URNS AUTO: ABNORMAL /HPF

## 2022-12-14 RX ADMIN — APIXABAN 5 MG: 5 TABLET, FILM COATED ORAL at 17:46

## 2022-12-14 RX ADMIN — Medication 1000 UNITS: at 11:03

## 2022-12-14 RX ADMIN — Medication: at 17:48

## 2022-12-14 RX ADMIN — PANTOPRAZOLE SODIUM 20 MG: 20 TABLET, DELAYED RELEASE ORAL at 05:23

## 2022-12-14 RX ADMIN — METOPROLOL TARTRATE 25 MG: 25 TABLET, FILM COATED ORAL at 11:03

## 2022-12-14 RX ADMIN — NYSTATIN: 100000 POWDER TOPICAL at 17:46

## 2022-12-14 RX ADMIN — METOPROLOL TARTRATE 25 MG: 25 TABLET, FILM COATED ORAL at 21:55

## 2022-12-14 RX ADMIN — APIXABAN 5 MG: 5 TABLET, FILM COATED ORAL at 11:03

## 2022-12-14 RX ADMIN — CLOBETASOL PROPIONATE 1 APPLICATION: 0.5 CREAM TOPICAL at 17:46

## 2022-12-14 RX ADMIN — PREDNISONE 5 MG: 5 TABLET ORAL at 17:46

## 2022-12-14 RX ADMIN — Medication: at 11:07

## 2022-12-14 RX ADMIN — GABAPENTIN 100 MG: 100 CAPSULE ORAL at 11:04

## 2022-12-14 RX ADMIN — HYDROXYCHLOROQUINE SULFATE 200 MG: 200 TABLET ORAL at 17:45

## 2022-12-14 RX ADMIN — METOPROLOL TARTRATE 25 MG: 25 TABLET, FILM COATED ORAL at 00:44

## 2022-12-14 RX ADMIN — FUROSEMIDE 40 MG: 40 TABLET ORAL at 11:03

## 2022-12-14 RX ADMIN — POTASSIUM CHLORIDE 10 MEQ: 750 TABLET, EXTENDED RELEASE ORAL at 11:03

## 2022-12-14 RX ADMIN — Medication: at 00:44

## 2022-12-14 NOTE — ASSESSMENT & PLAN NOTE
· Patient recently admitted and discharged to rehab; was just discharged lately  Generally, patient is able to take few steps sideways with her walker and goes back to bed  Is for most part sedentary    · Concern patient may not be able to be cared for at home as well by daughter and may need higher level of care   · Patient is agreeable to this

## 2022-12-14 NOTE — UTILIZATION REVIEW
Initial Clinical Review    Admission: Date/Time/Statement:   Admission Orders (From admission, onward)     Ordered        12/13/22 2318  Inpatient Admission  Once                      Orders Placed This Encounter   Procedures   • Inpatient Admission     Standing Status:   Standing     Number of Occurrences:   1     Order Specific Question:   Level of Care     Answer:   Med Surg [16]     Order Specific Question:   Estimated length of stay     Answer:   More than 2 Midnights     Order Specific Question:   Certification     Answer:   I certify that inpatient services are medically necessary for this patient for a duration of greater than two midnights  See H&P and MD Progress Notes for additional information about the patient's course of treatment  ED Arrival Information     Expected   -    Arrival   12/13/2022 21:05    Acuity   Less Urgent            Means of arrival   Ambulance    Escorted by   [facility]    Service   Hospitalist    Admission type   Emergency            Arrival complaint   Weakness           Chief Complaint   Patient presents with   • Medical Problem     Pt was d/c from rehab facility on Sunday, c/o ambulatory dysfunction since then  Provides no other complaints at this time       Initial Presentation: 74 y o  female W/ pmh morbid obesity, benign essential hypertension, gastroesophageal reflux disease, lumbar radiculopathy, paroxysmal atrial fibrillation, rheumatoid arthritis on chronic prednisone, vitamin D deficiency, to ED by ems admitted Inpatient d/t Ambulatory dysfunction  approximately 4 weeks ago was admitted due to acute congestive heart failure with bilateral pedal edema and cellulitis with SIRS  She was then treated with antibiotics transition to Keflex  Unfortunately, the patient was very weak and therefore was sent to rehab   just discharged from rehab within the past week  Daughter states that after rehab, she is barely able to walk    She is able to walk sidesteps using her walker however she was very unsteady they are afraid that she might fall  PT/OT  Possible need for skilled nursing facility  Case management consult      Date: 12/14   Day 2: PENDING    ED Triage Vitals   Temperature Pulse Respirations Blood Pressure SpO2   12/13/22 2116 12/13/22 2116 12/13/22 2116 12/13/22 2116 12/13/22 2116   98 5 °F (36 9 °C) 83 18 143/63 100 %      Temp Source Heart Rate Source Patient Position - Orthostatic VS BP Location FiO2 (%)   12/13/22 2116 12/13/22 2308 12/13/22 2308 12/13/22 2308 --   Oral Monitor Lying Right arm       Pain Score       12/13/22 2116       No Pain          Wt Readings from Last 1 Encounters:   12/08/22 120 kg (265 lb 9 6 oz)     Additional Vital Signs:   12/14/22 0800 98 3 °F (36 8 °C) 82 16 114/56 -- 99 % -- --   12/14/22 0600 -- 76 18 130/57 82 97 % None (Room air) --   12/14/22 0500 -- 76 18 138/65 93 98 % None (Room air) --   12/14/22 0400 -- 72 18 144/65 88 96 % None (Room air) --   12/14/22 0300 -- 76 18 150/64 92 97 % None (Room air) --   12/14/22 0200 -- 68 18 144/64 92 97 % None (Room air) --   12/14/22 0045 -- 80 18 169/72 103 99 % None (Room air) --   12/14/22 0044 -- 81 -- 169/72 -- -- -- --   12/13/22 2308 -- 83 20 145/62 -- 98 % None (Room air) Lying   12/13/22 2116 98 5 °F (36 9 °C) 83 18 143/63 -- 100 % None (Room air) --       Pertinent Labs/Diagnostic Test Results:   No orders to display         Results from last 7 days   Lab Units 12/14/22  0526 12/14/22  0050   WBC Thousand/uL 6 41  --    HEMOGLOBIN g/dL 12 4  --    HEMATOCRIT % 40 7  --    PLATELETS Thousands/uL 186 188   NEUTROS ABS Thousands/µL 4 16  --          Results from last 7 days   Lab Units 12/14/22  0526   SODIUM mmol/L 145   POTASSIUM mmol/L 3 5   CHLORIDE mmol/L 112*   CO2 mmol/L 26   ANION GAP mmol/L 7   BUN mg/dL 21   CREATININE mg/dL 1 07   EGFR ml/min/1 73sq m 51   CALCIUM mg/dL 9 3   MAGNESIUM mg/dL 2 0   PHOSPHORUS mg/dL 2 7     Results from last 7 days   Lab Units 12/14/22  0526   AST U/L 21   ALT U/L 19   ALK PHOS U/L 55   TOTAL PROTEIN g/dL 7 2   ALBUMIN g/dL 2 6*   TOTAL BILIRUBIN mg/dL 0 56         Results from last 7 days   Lab Units 12/14/22  0526   GLUCOSE RANDOM mg/dL 89       Results from last 7 days   Lab Units 12/14/22  0526   TSH 3RD GENERATON uIU/mL 3 600       Results from last 7 days   Lab Units 12/14/22  0044   CLARITY UA  Clear   COLOR UA  Yellow   SPEC GRAV UA  1 021   PH UA  5 5   GLUCOSE UA mg/dl Negative   KETONES UA mg/dl Negative   BLOOD UA  Trace*   PROTEIN UA mg/dl 30 (1+)*   NITRITE UA  Negative   BILIRUBIN UA  Negative   UROBILINOGEN UA (BE) mg/dl <2 0   LEUKOCYTES UA  Large*   WBC UA /hpf 30-50*   RBC UA /hpf 4-10*   BACTERIA UA /hpf None Seen   EPITHELIAL CELLS WET PREP /hpf Occasional       ED Treatment:   Medication Administration from 12/13/2022 2105 to 12/14/2022 4976       Date/Time Order Dose Route Action Action by Comments     12/14/2022 0044 EST metoprolol tartrate (LOPRESSOR) tablet 25 mg 25 mg Oral Given       12/14/2022 0523 EST pantoprazole (PROTONIX) EC tablet 20 mg 20 mg Oral Given       12/14/2022 0044 EST white petrolatum-mineral oil (EUCERIN,HYDROCERIN) cream -- Topical Given                     Past Medical History:   Diagnosis Date   • Abnormal thyroid function test     last assessed: Sept 25, 2015    • Arthritis    • Caries     last assessed: Sept 9, 2016    • Continuous opioid dependence ([facility] ) 12/2/2021   • Edema of right lower extremity     last assessed: March 18, 2015    • GERD (gastroesophageal reflux disease)    • Hypertension    • Medicare annual wellness visit, subsequent 12/2/2021   • Positive blood culture 3/11/2022   • Sarcoid      Present on Admission:  • Benign essential hypertension  • GERD (gastroesophageal reflux disease)  • Lumbar radiculopathy  • Morbid obesity (HCC)  • Rheumatoid arthritis ([facility] )  • Sarcoid      Admitting Diagnosis: Morbid obesity ([facility] ) [E66 01]  Weakness [R53 1]  Ambulatory dysfunction [R26 2]  Age/Sex: 74 y o  female  Admission Orders:  Scheduled Medications:  apixaban, 5 mg, Oral, BID  cholecalciferol, 1,000 Units, Oral, Daily  clobetasol, 1 application, Topical, BID  clotrimazole, , Topical, BID  furosemide, 40 mg, Oral, Daily  gabapentin, 100 mg, Oral, Daily  hydroxychloroquine, 200 mg, Oral, BID  metoprolol tartrate, 25 mg, Oral, Q12H FAMILIA  nystatin, , Topical, BID  pantoprazole, 20 mg, Oral, Early Morning  polyethylene glycol, 17 g, Oral, Daily  potassium chloride, 10 mEq, Oral, Daily  predniSONE, 5 mg, Oral, BID With Meals  senna-docusate sodium, 1 tablet, Oral, Daily  white petrolatum-mineral oil, , Topical, TID      PRN Meds:  acetaminophen, 650 mg, Oral, Q6H PRN  albuterol, 2 puff, Inhalation, Q6H PRN  aluminum-magnesium hydroxide-simethicone, 30 mL, Oral, Q6H PRN  dextromethorphan-guaiFENesin, 5 mL, Oral, Q6H PRN  ondansetron, 4 mg, Intravenous, Q6H PRN    scd  Pt/ot  I&o  Daily weights  oob  Cardiac diet      IP CONSULT TO CASE MANAGEMENT  IP CONSULT TO NUTRITION SERVICES    Network Utilization Review Department  ATTENTION: Please call with any questions or concerns to [phone] and carefully listen to the prompts so that you are directed to the right person  All voicemails are confidential   Humberto Fitzgerald all requests for admission clinical reviews, approved or denied determinations and any other requests to dedicated fax number below belonging to the campus where the patient is receiving treatment   List of dedicated fax numbers for the Facilities:  [address] DENIALS (Administrative/Medical Necessity) [phone]   [address] (Maternity/NICU/Pediatrics) [phone]    Mariel Jansen [phone]   Carlos Jacobs  [phone]   [address] - [facility] [address] Jagjit Saunders\[facility]\"" [phone]   [address] Arg[address] Diaz Replaced by [facility] 134 815 [facility] [phone]

## 2022-12-14 NOTE — ASSESSMENT & PLAN NOTE
On furosemide 40 mg daily also take it for chronic diastolic dysfunction  Metoprolol tartrate 25 mg every 12

## 2022-12-14 NOTE — ED ATTENDING ATTESTATION
12/13/2022  IHarper DO, saw and evaluated the patient  I have discussed the patient with the resident/non-physician practitioner and agree with the resident's/non-physician practitioner's findings, Plan of Care, and MDM as documented in the resident's/non-physician practitioner's note, except where noted  All available labs and Radiology studies were reviewed  I was present for key portions of any procedure(s) performed by the resident/non-physician practitioner and I was immediately available to provide assistance  At this point I agree with the current assessment done in the Emergency Department  I have conducted an independent evaluation of this patient a history and physical is as follows:    74 yof with worsening ambulatory dysfunction, recently home from medical rehab after admission for chronic cellulitis  Past Medical History:   Diagnosis Date   • Abnormal thyroid function test     last assessed: Sept 25, 2015    • Arthritis    • Caries     last assessed: Sept 9, 2016    • Continuous opioid dependence ([facility] [address] ) 12/2/2021   • Edema of right lower extremity     last assessed: March 18, 2015    • GERD (gastroesophageal reflux disease)    • Hypertension    • Medicare annual wellness visit, subsequent 12/2/2021   • Positive blood culture 3/11/2022   • Sarcoid      /63   Pulse 83   Temp 98 5 °F (36 9 °C) (Oral)   Resp 18   SpO2 100%   NAD, A&Ox3, no resp distress, RRR, abd soft/NT    Admit for PT, reeval for medical rehab             ED Course         Critical Care Time  Procedures

## 2022-12-14 NOTE — ASSESSMENT & PLAN NOTE
Patient recently admitted and discharged to rehab; was just discharged lately  Generally, patient is able to take few steps sideways with her walker and goes back to bed  Is for most part sedentary  Patient also is morbidly obese; on chronic prednisone  Physical and Occupational Therapy for assessment of safety issues and ambulation  Self-care  Case management consult  Ambulatory dysfunction is multifactorial   It may also be that ambulation for this patient is going to be increasingly difficult  May need skilled level nursing care?

## 2022-12-14 NOTE — PROGRESS NOTES
[address] 1950, 74 y o  female MRN: [id number]  Unit/Bed#: [facility] 304-01 Encounter: [id number]  Primary Care Provider: Leonel Mora DO   Date and time admitted to hospital: 12/13/2022  9:05 PM    * Ambulatory dysfunction  Assessment & Plan  · Patient recently admitted and discharged to rehab; was just discharged lately  Generally, patient is able to take few steps sideways with her walker and goes back to bed  Is for most part sedentary  · Concern patient may not be able to be cared for at home as well by daughter and may need higher level of care   · Patient is agreeable to this     Abnormal urinalysis  Assessment & Plan  · UA with 30-50 WBC, no epifanio, large leuks   · Without urinary symptoms and blood work is normal   · Likely pyuria   · Will hold off on treating for now     Sarcoid  Assessment & Plan  · Noted history   · Follows with outpatient rheum     Rheumatoid arthritis ([facility] )  Assessment & Plan  · On plaquenil 200 mg BID as well as prednisone 5 mg BID   · Continue while inpatient     Lumbar radiculopathy  Assessment & Plan  · On Neurontin 100 mg in the a m  [facility] ([facility] )  Assessment & Plan  · Needs intensive nutritional consult to promote weight loss  Benign essential hypertension  Assessment & Plan  · On furosemide 40 mg daily also take it for chronic diastolic dysfunction  · Metoprolol tartrate 25 mg every 12  GERD (gastroesophageal reflux disease)  Assessment & Plan  · On Protonix 20 mg daily  · Maalox to continue  VTE Pharmacologic Prophylaxis:   Moderate Risk (Score 3-4) - Pharmacological DVT Prophylaxis Ordered: apixaban (Eliquis)  Patient Centered Rounds: I performed bedside rounds with nursing staff today  Discussions with Specialists or Other Care Team Provider: SHARRI     Education and Discussions with Family / Patient: Updated  (daughter) via phone      Time Spent for Care: 30 minutes  More than 50% of total time spent on counseling and coordination of care as described above  Current Length of Stay: 1 day(s)  Current Patient Status: Inpatient   Certification Statement: The patient will continue to require additional inpatient hospital stay due to pending PT/OT evals   Discharge Plan: Anticipate discharge in 24-48 hrs to rehab facility  Code Status: Level 1 - Full Code    Subjective:   Patient complaining of bilateral lower extremity pain in her knees  Denies chest pain, or shortness of breath  He denies any numbness or tingling of the lower extremities  Objective:     Vitals:   Temp (24hrs), Av 4 °F (36 9 °C), Min:98 3 °F (36 8 °C), Max:98 5 °F (36 9 °C)    Temp:  [98 3 °F (36 8 °C)-98 5 °F (36 9 °C)] 98 3 °F (36 8 °C)  HR:  [68-83] 82  Resp:  [16-20] 16  BP: (114-169)/(56-72) 114/56  SpO2:  [96 %-100 %] 99 %  There is no height or weight on file to calculate BMI  Input and Output Summary (last 24 hours): Intake/Output Summary (Last 24 hours) at 2022 1557  Last data filed at 2022 1336  Gross per 24 hour   Intake 720 ml   Output --   Net 720 ml       Physical Exam:   Physical Exam  Vitals and nursing note reviewed  Constitutional:       General: She is not in acute distress  Appearance: She is obese  HENT:      Head: Normocephalic and atraumatic  Comments: Dry skin on scalp   Cardiovascular:      Rate and Rhythm: Normal rate and regular rhythm  Pulmonary:      Effort: Pulmonary effort is normal       Breath sounds: Normal breath sounds  Abdominal:      General: Abdomen is flat  Palpations: Abdomen is soft  Musculoskeletal:         General: Tenderness (bilateral knees and anterior shins ) present  Skin:     General: Skin is dry  Neurological:      General: No focal deficit present  Mental Status: She is alert and oriented to person, place, and time     Psychiatric:         Mood and Affect: Mood normal           Additional Data:     Labs:  Results from last 7 days   Lab Units 12/14/22  0526   WBC Thousand/uL 6 41   HEMOGLOBIN g/dL 12 4   HEMATOCRIT % 40 7   PLATELETS Thousands/uL 186   NEUTROS PCT % 64   LYMPHS PCT % 18   MONOS PCT % 11   EOS PCT % 5     Results from last 7 days   Lab Units 12/14/22  0526   SODIUM mmol/L 145   POTASSIUM mmol/L 3 5   CHLORIDE mmol/L 112*   CO2 mmol/L 26   BUN mg/dL 21   CREATININE mg/dL 1 07   ANION GAP mmol/L 7   CALCIUM mg/dL 9 3   ALBUMIN g/dL 2 6*   TOTAL BILIRUBIN mg/dL 0 56   ALK PHOS U/L 55   ALT U/L 19   AST U/L 21   GLUCOSE RANDOM mg/dL 89                       Lines/Drains:  Invasive Devices     Peripheral Intravenous Line  Duration           Peripheral IV 12/14/22 Right;Ventral (anterior) Hand <1 day          Drain  Duration           External Urinary Catheter -- days                      Imaging: No pertinent imaging reviewed      Recent Cultures (last 7 days):         Last 24 Hours Medication List:   Current Facility-Administered Medications   Medication Dose Route Frequency Provider Last Rate   • acetaminophen  650 mg Oral Q6H PRN Maryana Schwartz MD     • albuterol  2 puff Inhalation Q6H PRN Maryana Schwartz MD     • aluminum-magnesium hydroxide-simethicone  30 mL Oral Q6H PRN Maryana Schwartz MD     • apixaban  5 mg Oral BID Maryana Schwartz MD     • cholecalciferol  1,000 Units Oral Daily Maryana Schwartz MD     • clobetasol  1 application Topical BID Maryana Schwartz MD     • clotrimazole   Topical BID Maryana Schwartz MD     • dextromethorphan-guaiFENesin  5 mL Oral Q6H PRN Maryana Schwartz MD     • furosemide  40 mg Oral Daily Maryana Schwartz MD     • gabapentin  100 mg Oral Daily Maryana Schwartz MD     • hydroxychloroquine  200 mg Oral BID Maryana Schwartz MD     • metoprolol tartrate  25 mg Oral Q12H [address] Lili Tan MD     • nystatin   Topical BID Maryana Schwartz MD     • ondansetron  4 mg Intravenous Q6H PRN Pepe Camacho Adam Jaramillo MD     • pantoprazole  20 mg Oral Early Morning Jared Jimenez MD     • polyethylene glycol  17 g Oral Daily Jared Jimenez MD     • potassium chloride  10 mEq Oral Daily Jared Jimenez MD     • predniSONE  5 mg Oral BID With Meals Jared Jimenez MD     • senna-docusate sodium  1 tablet Oral Daily Jared Jimenez MD     • white petrolatum-mineral oil   Topical TID Jared Jimenez MD          Today, Patient Was Seen By: Elinor Green PA-C    **Please Note: This note may have been constructed using a voice recognition system  **

## 2022-12-14 NOTE — H&P
[facility] [address] 1950, 74 y o  female MRN: [id number]  Unit/Bed#: [facility] Encounter: [id number]  Primary Care Provider: Sasha Doyle DO   Date and time admitted to hospital: 12/13/2022  9:05 PM    * Ambulatory dysfunction  Assessment & Plan  Patient recently admitted and discharged to rehab; was just discharged lately  Generally, patient is able to take few steps sideways with her walker and goes back to bed  Is for most part sedentary  Patient also is morbidly obese; on chronic prednisone  Physical and Occupational Therapy for assessment of safety issues and ambulation  Self-care  Case management consult  Ambulatory dysfunction is multifactorial   It may also be that ambulation for this patient is going to be increasingly difficult  May need skilled level nursing care? Lumbar radiculopathy  Assessment & Plan  On Neurontin 100 mg in the a m  Morbid obesity ([address] )  Assessment & Plan  Needs intensive nutritional consult to promote weight loss  Benign essential hypertension  Assessment & Plan  On furosemide 40 mg daily also take it for chronic diastolic dysfunction  Metoprolol tartrate 25 mg every 12  GERD (gastroesophageal reflux disease)  Assessment & Plan  On Protonix 20 mg daily  Maalox to continue  VTE Prophylaxis: Heparin  / sequential compression device   Code Status: Level 1 - Full Code as discussed with patient and daughter who is POA  POLST: There is no POLST form on file for this patient (pre-hospital)    Anticipated Length of Stay:  Patient will be admitted on an Inpatient basis with an anticipated length of stay of greater than 2 midnights  Justification for Hospital Stay: Please see detailed plans noted above  Chief Complaint:     Patient cannot walk  Family frustrated    History of Present Illness:  Zanedr Badillo is a 74 y o  female who has past medical history significant for morbid obesity, benign essential hypertension, gastroesophageal reflux disease, lumbar radiculopathy, paroxysmal atrial fibrillation, rheumatoid arthritis on chronic prednisone, vitamin D deficiency who comes in because of inability to walk  Review of history shows that the patient approximately 4 weeks ago was admitted due to acute congestive heart failure with bilateral pedal edema and cellulitis with SIRS  She was then treated with antibiotics transition to Keflex  Unfortunately, the patient was very weak and therefore was sent to rehab  The patient was just discharged from rehab within the past week  Daughter states that after rehab, she is barely able to walk  She is able to walk sidesteps using her walker however she was very unsteady they are afraid that she might fall  That said, she is unable to walk for the past few days and just practically stays in bed  The patient was brought here to the emergency room due to this problem  Currently, patient is lying flat on bed and generally comfortable as long as she is upright      Review of Systems:    Constitutional:  Denies fever or chills   Eyes:  Denies change in visual acuity   HENT:  Denies nasal congestion or sore throat   Respiratory:  Denies cough or shortness of breath   Cardiovascular:  Denies chest pain or edema   GI:  Denies abdominal pain, nausea, vomiting, bloody stools or diarrhea   :  Denies dysuria   Musculoskeletal:  Denies back pain or joint pain   Integument:  Denies rash   Neurologic:  Denies headache, focal weakness or sensory changes   Endocrine:  Denies polyuria or polydipsia   Psychiatric:  Denies depression or anxiety     Past Medical and Surgical History:   Past Medical History:   Diagnosis Date   • Abnormal thyroid function test     last assessed: Sept 25, 2015    • Arthritis    • Caries     last assessed: Sept 9, 2016    • [facility] ) 12/2/2021   • Edema of right lower extremity     last assessed: March 18, 2015    • GERD (gastroesophageal reflux disease)    • Hypertension    • Medicare annual wellness visit, subsequent 2021   • Positive blood culture 3/11/2022   • Sarcoid      Past Surgical History:   Procedure Laterality Date   •  SECTION     • MULTIPLE TOOTH EXTRACTIONS N/A 2016    Procedure: Surgical extraction of teeth 2, 18, 19, 30, 31; incision and drainage of left subperiosteal abscess ;  Surgeon: Coco Castillo DMD;  Location: [facility];  Service:        Meds/Allergies:  (Not in a hospital admission)      Allergies: Allergies   Allergen Reactions   • Methotrexate Derivatives    • Amoxicillin Rash   • Ampicillin-Sulbactam Sodium Rash   • Shellfish-Derived Products - Food Allergy Itching     History:  Marital Status: Single   Occupation: Retired  Patient Pre-hospital Living Situation: Lives at home with daughter  Patient Pre-hospital Level of Mobility: Generally should be able to ambulate with walker  But lately has been too weak to do so  Patient Pre-hospital Diet Restrictions: Diabetic cardiac    Substance Use History:   Social History     Substance and Sexual Activity   Alcohol Use No     Social History     Tobacco Use   Smoking Status Never   Smokeless Tobacco Never     Social History     Substance and Sexual Activity   Drug Use No       Family History:  Family History   Problem Relation Age of Onset   • Asthma Mother    • Stroke Mother    • No Known Problems Father    • No Known Problems Sister    • No Known Problems Brother    • No Known Problems Maternal Grandmother    • No Known Problems Maternal Grandfather    • No Known Problems Paternal Grandmother    • No Known Problems Paternal Grandfather    • Diabetes Maternal Aunt    • Breast cancer Cousin    • Diabetes Cousin    • Breast cancer Other        Physical Exam:     Vitals:   Blood Pressure: 145/62 (22 2308)  Pulse: 83 (22 2308)  Temperature: 98 5 °F (36 9 °C) (22)  Temp Source: Oral (22)  Respirations: 20 (12/13/22 2308)  SpO2: 98 % (12/13/22 2308)    Constitutional:  Well developed, well nourished, no acute distress, non-toxic appearance although sickly appearing  Eyes:  PERRL, conjunctiva normal   HENT:  Atraumatic, external ears normal, nose normal, oropharynx moist, no pharyngeal exudates  Neck- normal range of motion, no tenderness, supple   Respiratory:  No respiratory distress, normal breath sounds, no rales, no wheezing   Cardiovascular:  Normal rate, normal rhythm, no murmurs, no gallops, no rubs   GI:  Soft, obese abdomen, normal bowel sounds, nontender, no organomegaly, no mass, no rebound, no guarding   :  No costovertebral angle tenderness   Musculoskeletal: Bilateral pedal edema, no tenderness, no deformities  Back- no tenderness  Integument:  Well hydrated, eczematous rash bilateral lower extremities  Lymphatic:  No lymphadenopathy noted   Neurologic: Alert and awake, barely communicative, can move all 4 extremities  Psychiatric:  Speech and behavior minimal      Lab Results: I have personally reviewed pertinent reports  Laboratories ordered and pending  Imaging: I have personally reviewed pertinent reports  XR chest portable    Result Date: 11/22/2022  Narrative: CHEST INDICATION:  Leg swelling  COMPARISON:  11/19/2022 EXAM PERFORMED/VIEWS:  XR CHEST PORTABLE FINDINGS: Cardiomediastinal silhouette appears unremarkable  Stable platelike atelectasis on the right  Lungs are otherwise clear though evaluation is limited by shallow inspiration with crowding of the lung markings     No pneumothorax or pleural effusion  Osseous structures appear within normal limits for patient age  Impression: No active pulmonary disease on examination which is somewhat limited secondary to low lung volumes  Workstation performed: [id number]     XR chest 1 view portable    Result Date: 11/19/2022  Narrative: CHEST INDICATION:   fall  COMPARISON:  Chest radiograph March 9, 2022 and CT chest July 10, 2022  EXAM PERFORMED/VIEWS:  XR CHEST PORTABLE FINDINGS: The heart is mildly enlarged  No focal area of consolidation  There is mild pulmonary vascular congestion and very minimal interstitial edema  There is trace fluid in the minor fissure  No sizable pleural effusions  Osseous structures appear within normal limits for patient age  Impression: Minimal CHF  Workstation performed: [id number]     XR knee 4+ views Right injury    Result Date: 11/19/2022  Narrative: RIGHT KNEE INDICATION:   injury  COMPARISON:  None VIEWS:  XR KNEE 4+ VW RIGHT INJURY FINDINGS: There is no acute fracture or dislocation  There is no joint effusion  There are marked osteoarthritic changes predominantly in the medial femorotibial joint and patellofemoral joint with extensive joint space narrowing and osteophyte formation  No lytic or blastic osseous lesion  The bones are diffusely demineralized  Soft tissues are unremarkable  Impression: Osteoarthritis with no acute osseous abnormality  Workstation performed: [id number]     XR tibia fibula 2 views RIGHT    Result Date: 11/19/2022  Narrative: RIGHT TIBIA AND FIBULA INDICATION:   injury  COMPARISON:  None VIEWS:  XR TIBIA FIBULA 2 VW RIGHT FINDINGS: There is no acute fracture or dislocation  Degenerative changes of the knee reported separately with knee images  No lytic or blastic osseous lesion  The bones are diffusely demineralized  Soft tissues are unremarkable  Impression: No acute osseous abnormality  Workstation performed: [id number]     CT head without contrast    Result Date: 11/19/2022  Narrative: CT BRAIN - WITHOUT CONTRAST INDICATION:   fall yesterday,no head strike  COMPARISON:  Brain CT from 7/10/2022  TECHNIQUE:  CT examination of the brain was performed  In addition to axial images, sagittal and coronal 2D reformatted images were created and submitted for interpretation  Radiation dose length product (DLP) for this visit:  874 97 mGy-cm     This examination, like all CT scans performed in the [facility], was performed utilizing techniques to minimize radiation dose exposure, including the use of iterative  reconstruction and automated exposure control  IMAGE QUALITY:  Diagnostic  FINDINGS: PARENCHYMA: Decreased attenuation is noted in periventricular and subcortical white matter demonstrating an appearance that is statistically most likely to represent mild microangiopathic change; this appearance is similar when compared to most recent prior examination  No CT signs of acute infarction  No intracranial mass, mass effect or midline shift  No acute parenchymal hemorrhage  VENTRICLES AND EXTRA-AXIAL SPACES:  Normal for the patient's age  VISUALIZED ORBITS AND PARANASAL SINUSES:  Air-fluid level in the right maxillary sinus, which could represent acute sinusitis (series 3 image 2 ) CALVARIUM AND EXTRACRANIAL SOFT TISSUES:  Normal      Impression: No acute intracranial abnormality  Chronic microangiopathic changes  Air-fluid level in the right maxillary sinus, which could represent acute sinusitis (series 3 image 2 ) Workstation performed: [id number]     VAS lower limb venous duplex study, complete bilateral    Result Date: 11/19/2022  Narrative:  [facility] REPORT CLINICAL: Indications: Patient presents with bilateral lower extremity pain and swelling  Operative History: No Cardiovascular Surgeries Risk Factors The patient has history of HTN  CONCLUSION:  Impression: RIGHT LOWER LIMB: No evidence of acute or chronic deep vein thrombosis  No evidence of superficial thrombophlebitis noted  Doppler evaluation shows a normal response to augmentation maneuvers  Popliteal, posterior tibial and anterior tibial arterial Doppler waveforms are triphasic  LEFT LOWER LIMB: No evidence of acute or chronic deep vein thrombosis  No evidence of superficial thrombophlebitis noted  Doppler evaluation shows a normal response to augmentation maneuvers   Popliteal, posterior tibial and anterior tibial arterial Doppler waveforms are triphasic  Technically difficult/limited study  Technical findings were given to Dr Rajat Palafox  SIGNATURE: Electronically Signed by: Andres Castellanos MD on 2022-11-19 09:31:48 PM    Echo complete w/ contrast if indicated    Result Date: 11/21/2022  Narrative: •  Left Ventricle: Left ventricular cavity size is normal  Wall thickness is mildly increased  The left ventricular ejection fraction is 55%  Systolic function is normal  Wall motion is normal  Diastolic function is moderately abnormal, consistent with grade II (pseudonormal) relaxation  •  Right Ventricle: Right ventricular cavity size is normal  Systolic function is normal  •  Left Atrium: The atrium is mildly dilated  •  Mitral Valve: There is trace regurgitation  •  Prior TTE study available for comparison  Prior study date: 1/10/2022  No significant changes noted compared to the prior study  ** Please Note: Dragon 360 Dictation voice to text software was used in the creation of this document   **

## 2022-12-14 NOTE — ASSESSMENT & PLAN NOTE
· On furosemide 40 mg daily also take it for chronic diastolic dysfunction  · Metoprolol tartrate 25 mg every 12

## 2022-12-14 NOTE — PLAN OF CARE
Problem: OCCUPATIONAL THERAPY ADULT  Goal: Performs self-care activities at highest level of function for planned discharge setting  See evaluation for individualized goals  Description: Treatment Interventions: ADL retraining, Functional transfer training, Endurance training, Patient/family training, Compensatory technique education, Continued evaluation          See flowsheet documentation for full assessment, interventions and recommendations  12/14/2022 1328 by Berenice Lopez OT  Note: Limitation: Decreased ADL status, Decreased Safe judgement during ADL, Decreased endurance, Decreased self-care trans, Decreased high-level ADLs  Prognosis: Good  Assessment: Pt is a 74 y o  YO  female admitted to [facility] on [facility]/13/2022 w/ ambulatory dysfxn  PT d/c from rehab on Sunday and reported weakness at [facility]  Pt  has a past medical history of Abnormal thyroid function test, Arthritis, Caries, Continuous opioid dependence ([facility] [address] ), Edema of right lower extremity, GERD (gastroesophageal reflux disease), Hypertension, Medicare annual wellness visit, subsequent, Positive blood culture, and Sarcoid  Pt with active OT orders and up and OOB as tolerated orders  since recent rehab admission- unable to ambulate, reports was only transferring <>BSC at rehab, assist for ADLs/IADLs  Lives w' dtr who works full time Currently pt is max a for lb adls, mod a for ub adls, and depedent for sit to stand transfers  Pt is limited at this time 2*: endurance, activity tolerance, functional mobility, forward functional reach, unsupportive home environment, decreased I w/ ADLS/IADLS and decreased safety awareness  The following Occupational Performance Areas to address include: grooming, bathing/shower, toilet hygiene, dressing and functional mobility  Based on the aforementioned OT evaluation, functional performance deficits, and assessments, pt has been identified as a high complexity evaluation  From OT standpoint, anticipate d/c STR   Pt to continue to benefit from acute immediate OT services to address the following goals 2-4 times for 10-14 days The patient's raw score on the AM-PAC Daily Activity inpatient short form is 13, standardized score is 32 03, less than 39 4  Patients at this level are likely to benefit from discharge to post-acute rehabilitation services  Please refer to the recommendation of the Occupational Therapist for safe discharge planning        OT Discharge Recommendation: Post acute rehabilitation services       12/14/2022 1327 by [place]any [place] OT  Note: Limitation: Decreased ADL status, Decreased Safe judgement during ADL, Decreased endurance, Decreased self-care trans, Decreased high-level ADLs  Prognosis: Good  Assessment: Pt is a 74 y o  YO  female admitted to [facility] on 12/13/2022 w/ ambulatory dysfxn  PT d/c from rehab on Sunday and reported weakness at [facility]  Pt  has a past medical history of Abnormal thyroid function test, Arthritis, Caries, Continuous opioid dependence ([facility] [address] ), Edema of right lower extremity, GERD (gastroesophageal reflux disease), Hypertension, Medicare annual wellness visit, subsequent, Positive blood culture, and Sarcoid  Pt with active OT orders and up and OOB as tolerated orders  since recent rehab admission- unable to ambulate, reports was only transferring <>BSC at rehab, assist for ADLs/IADLs  Lives w' dtr who works full time Currently pt is max a for lb adls, mod a for ub adls, and depedent for sit to stand transfers  Pt is limited at this time 2*: endurance, activity tolerance, functional mobility, forward functional reach, unsupportive home environment, decreased I w/ ADLS/IADLS and decreased safety awareness  The following Occupational Performance Areas to address include: grooming, bathing/shower, toilet hygiene, dressing and functional mobility  Based on the aforementioned OT evaluation, functional performance deficits, and assessments, pt has been identified as a high complexity evaluation  From OT standpoint, anticipate d/c STR  Pt to continue to benefit from acute immediate OT services to address the following goals 2-4 times for 10-14 days The patient's raw score on the AM-PAC Daily Activity inpatient short form is 13, standardized score is 32 03, less than 39 4  Patients at this level are likely to benefit from discharge to post-acute rehabilitation services  Please refer to the recommendation of the Occupational Therapist for safe discharge planning          OT Discharge Recommendation: Post acute rehabilitation services

## 2022-12-14 NOTE — ED PROVIDER NOTES
History  Chief Complaint   Patient presents with   • Medical Problem     Pt was d/c from rehab facility on Sunday, c/o ambulatory dysfunction since then  Provides no other complaints at this time     72F presented to the emergency department for ambulatory dysfunction  She was recently admitted to the hospital and was discharged from rehab on Sunday  Since then she has been attempting to ambulate with a walker at home but has been having difficulty with walking  She denies all symptoms currently  She is interested in being readmitted for rehab placement  Prior to Admission Medications   Prescriptions Last Dose Informant Patient Reported? Taking? Clobetasol Propionate E 0 05 % emollient cream   Yes No   Sig: APPLY TWICE A DAY FOR PALM SKIN DERMITITS   acetaminophen (TYLENOL) 325 mg tablet   No No   Sig: Take 2 tablets (650 mg total) by mouth every 4 (four) hours as needed for mild pain, headaches or fever  albuterol (Ventolin HFA) 90 mcg/act inhaler   No No   Sig: Inhale 2 puffs every 6 (six) hours as needed for wheezing   alendronate (FOSAMAX) 70 mg tablet   Yes No   Sig: Take by mouth every 7 days    apixaban (ELIQUIS) 5 mg   No No   Sig: Take 1 tablet (5 mg total) by mouth 2 (two) times a day   cholecalciferol (VITAMIN D3) 1,000 units tablet   No No   Sig: Take 1 tablet (1,000 Units total) by mouth daily   furosemide (LASIX) 40 mg tablet   No No   Sig: Take 1 tablet (40 mg total) by mouth daily Do not start before November 26, 2022     gabapentin (Neurontin) 100 mg capsule   No No   Sig: Take 1 capsule in AM and 3 capsules in PM   Patient taking differently: 100 mg daily   guaifenesin-codeine (GUAIFENESIN AC) 100-10 MG/5ML liquid   No No   Sig: Take 5 mL by mouth 3 (three) times a day as needed for cough   hydroxychloroquine (PLAQUENIL) 200 mg tablet   No No   Sig: Take 1 tablet (200 mg total) by mouth 2 (two) times a day   ketoconazole (NIZORAL) 2 % cream   No No   Sig: Apply topically 2 (two) times a day   metoprolol tartrate (LOPRESSOR) 25 mg tablet   No No   Sig: Take 1 tablet (25 mg total) by mouth every 12 (twelve) hours   nystatin (MYCOSTATIN) powder   No No   Sig: Apply topically 2 (two) times a day   omeprazole (PriLOSEC) 20 mg delayed release capsule   No No   Sig: Take 1 capsule (20 mg total) by mouth daily   polyethylene glycol (MIRALAX) 17 g packet   No No   Sig: Take 17 g by mouth daily Do not start before 2022     potassium chloride (K-DUR,KLOR-CON) 10 mEq tablet   No No   Sig: Take 1 tablet (10 mEq total) by mouth daily   predniSONE 5 mg tablet   No No   Sig: Take 1 tablet (5 mg total) by mouth 2 (two) times a day with meals   senna-docusate sodium (SENOKOT S) 8 6-50 mg per tablet   No No   Sig: Take 1 tablet by mouth daily   triamcinolone (KENALOG) 0 1 % cream   No No   Sig: Apply 1 application topically 2 (two) times a day for 14 days To affected area   white petrolatum-mineral oil (EUCERIN,HYDROCERIN) cream   No No   Sig: Apply topically 3 (three) times a day      Facility-Administered Medications: None       Past Medical History:   Diagnosis Date   • Abnormal thyroid function test     last assessed: 2015    • Arthritis    • Caries     last assessed: 2016    • Continuous opioid dependence ([facility] [address] ) 2021   • Edema of right lower extremity     last assessed: 2015    • GERD (gastroesophageal reflux disease)    • Hypertension    • Medicare annual wellness visit, subsequent 2021   • Positive blood culture 3/11/2022   • Sarcoid        Past Surgical History:   Procedure Laterality Date   •  SECTION     • MULTIPLE TOOTH EXTRACTIONS N/A 2016    Procedure: Surgical extraction of teeth 2, 18, 19, 30, 31; incision and drainage of left subperiosteal abscess ;  Surgeon: Jovita Glynn DMD;  Location: [facility];  Service:        Family History   Problem Relation Age of Onset   • Asthma Mother    • Stroke Mother    • No Known Problems Father    • No Known Problems Sister    • No Known Problems Brother    • No Known Problems Maternal Grandmother    • No Known Problems Maternal Grandfather    • No Known Problems Paternal Grandmother    • No Known Problems Paternal Grandfather    • Diabetes Maternal Aunt    • Breast cancer Cousin    • Diabetes Cousin    • Breast cancer Other      I have reviewed and agree with the history as documented  E-Cigarette/Vaping   • E-Cigarette Use Never User      E-Cigarette/Vaping Substances   • Nicotine No    • THC No    • CBD No    • Flavoring No    • Other No    • Unknown No      Social History     Tobacco Use   • Smoking status: Never   • Smokeless tobacco: Never   Vaping Use   • Vaping Use: Never used   Substance Use Topics   • Alcohol use: No   • Drug use: No        Review of Systems   Constitutional: Negative for fever  Respiratory: Negative for cough and shortness of breath  Cardiovascular: Negative for chest pain  Gastrointestinal: Negative for abdominal pain, nausea and vomiting  Genitourinary: Negative for dysuria  Neurological: Negative for dizziness and light-headedness  All other systems reviewed and are negative  Physical Exam  ED Triage Vitals   Temperature Pulse Respirations Blood Pressure SpO2   12/13/22 2116 12/13/22 2116 12/13/22 2116 12/13/22 2116 12/13/22 2116   98 5 °F (36 9 °C) 83 18 143/63 100 %      Temp Source Heart Rate Source Patient Position - Orthostatic VS BP Location FiO2 (%)   12/13/22 2116 12/13/22 2308 12/13/22 2308 12/13/22 2308 --   Oral Monitor Lying Right arm       Pain Score       12/13/22 2116       No Pain             Orthostatic Vital Signs  Vitals:    12/14/22 0200 12/14/22 0300 12/14/22 0400 12/14/22 0500   BP: 144/64 150/64 144/65 138/65   Pulse: 68 76 72 76   Patient Position - Orthostatic VS:           Physical Exam  Vitals and nursing note reviewed  Constitutional:       General: She is not in acute distress       Appearance: She is not ill-appearing  HENT:      Head: Normocephalic  Eyes:      Pupils: Pupils are equal, round, and reactive to light  Cardiovascular:      Rate and Rhythm: Normal rate and regular rhythm  Heart sounds: No murmur heard  Pulmonary:      Effort: Pulmonary effort is normal  No respiratory distress  Breath sounds: Normal breath sounds  No wheezing, rhonchi or rales  Abdominal:      General: Abdomen is flat  There is no distension  Palpations: Abdomen is soft  Tenderness: There is no abdominal tenderness  There is no guarding or rebound  Skin:     General: Skin is warm and dry  Neurological:      Mental Status: She is alert           ED Medications  Medications   albuterol (PROVENTIL HFA,VENTOLIN HFA) inhaler 2 puff (has no administration in time range)   apixaban (ELIQUIS) tablet 5 mg (has no administration in time range)   cholecalciferol (VITAMIN D3) tablet 1,000 Units (has no administration in time range)   clobetasol (TEMOVATE) 8 12 % cream 1 application (has no administration in time range)   furosemide (LASIX) tablet 40 mg (has no administration in time range)   gabapentin (NEURONTIN) capsule 100 mg (has no administration in time range)   dextromethorphan-guaiFENesin (ROBITUSSIN DM) oral syrup 5 mL (has no administration in time range)   hydroxychloroquine (PLAQUENIL) tablet 200 mg (has no administration in time range)   clotrimazole (LOTRIMIN) 1 % cream (has no administration in time range)   metoprolol tartrate (LOPRESSOR) tablet 25 mg (25 mg Oral Given 12/14/22 0044)   nystatin (MYCOSTATIN) powder (has no administration in time range)   pantoprazole (PROTONIX) EC tablet 20 mg (20 mg Oral Given 12/14/22 0341)   polyethylene glycol (MIRALAX) packet 17 g (has no administration in time range)   potassium chloride (K-DUR,KLOR-CON) CR tablet 10 mEq (has no administration in time range)   predniSONE tablet 5 mg (has no administration in time range)   senna-docusate sodium (SENOKOT S) 8 6-50 mg per tablet 1 tablet (has no administration in time range)   white petrolatum-mineral oil (EUCERIN,HYDROCERIN) cream ( Topical Given 12/14/22 0044)   acetaminophen (TYLENOL) tablet 650 mg (has no administration in time range)   ondansetron (ZOFRAN) injection 4 mg (has no administration in time range)   aluminum-magnesium hydroxide-simethicone (MYLANTA) oral suspension 30 mL (has no administration in time range)       Diagnostic Studies  Results Reviewed     Procedure Component Value Units Date/Time    Comprehensive metabolic panel [[id number]]  (Abnormal) Collected: 12/14/22 0526    Lab Status: Final result Specimen: Blood from Hand, Right Updated: 12/14/22 0601     Sodium 145 mmol/L      Potassium 3 5 mmol/L      Chloride 112 mmol/L      CO2 26 mmol/L      ANION GAP 7 mmol/L      BUN 21 mg/dL      Creatinine 1 07 mg/dL      Glucose 89 mg/dL      Calcium 9 3 mg/dL      Corrected Calcium 10 4 mg/dL      AST 21 U/L      ALT 19 U/L      Alkaline Phosphatase 55 U/L      Total Protein 7 2 g/dL      Albumin 2 6 g/dL      Total Bilirubin 0 56 mg/dL      eGFR 51 ml/min/1 73sq m     Narrative:      [place] guidelines for Chronic Kidney Disease (CKD):   •  Stage 1 with normal or high GFR (GFR > 90 mL/min/1 73 square meters)  •  Stage 2 Mild CKD (GFR = 60-89 mL/min/1 73 square meters)  •  Stage 3A Moderate CKD (GFR = 45-59 mL/min/1 73 square meters)  •  Stage 3B Moderate CKD (GFR = 30-44 mL/min/1 73 square meters)  •  Stage 4 Severe CKD (GFR = 15-29 mL/min/1 73 square meters)  •  Stage 5 End Stage CKD (GFR <15 mL/min/1 73 square meters)  Note: GFR calculation is accurate only with a steady state creatinine    Phosphorus [[id number]]  (Normal) Collected: 12/14/22 0526    Lab Status: Final result Specimen: Blood from Hand, Right Updated: 12/14/22 0601     Phosphorus 2 7 mg/dL     Magnesium [[id number]]  (Normal) Collected: 12/14/22 0526    Lab Status: Final result Specimen: Blood from Hand, Right Updated: 12/14/22 0601     Magnesium 2 0 mg/dL     CBC and differential [[id number]]  (Abnormal) Collected: 12/14/22 0526    Lab Status: Final result Specimen: Blood from Hand, Right Updated: 12/14/22 0552     WBC 6 41 Thousand/uL      RBC 4 45 Million/uL      Hemoglobin 12 4 g/dL      Hematocrit 40 7 %      MCV 92 fL      MCH 27 9 pg      MCHC 30 5 g/dL      RDW 13 9 %      MPV 11 6 fL      Platelets 414 Thousands/uL      nRBC 0 /100 WBCs      Neutrophils Relative 64 %      Immat GRANS % 1 %      Lymphocytes Relative 18 %      Monocytes Relative 11 %      Eosinophils Relative 5 %      Basophils Relative 1 %      Neutrophils Absolute 4 16 Thousands/µL      Immature Grans Absolute 0 03 Thousand/uL      Lymphocytes Absolute 1 17 Thousands/µL      Monocytes Absolute 0 68 Thousand/µL      Eosinophils Absolute 0 33 Thousand/µL      Basophils Absolute 0 04 Thousands/µL     TSH, 3rd generation [[id number]] Collected: 12/14/22 0526    Lab Status:  In process Specimen: Blood from Hand, Right Updated: 12/14/22 0531    Urine Microscopic [[id number]]  (Abnormal) Collected: 12/14/22 0044    Lab Status: Final result Specimen: Urine, Other Updated: 12/14/22 0148     RBC, UA 4-10 /hpf      WBC, UA 30-50 /hpf      Epithelial Cells Occasional /hpf      Bacteria, UA None Seen /hpf     Platelet count [[id number]]  (Normal) Collected: 12/14/22 0050    Lab Status: Final result Specimen: Blood from Hand, Right Updated: 12/14/22 0111     Platelets 912 Thousands/uL      MPV 11 7 fL     UA (URINE) with reflex to Scope [[id number]]  (Abnormal) Collected: 12/14/22 0044    Lab Status: Final result Specimen: Urine, Other Updated: 12/14/22 0101     Color, UA Yellow     Clarity, UA Clear     Specific Gravity, UA 1 021     pH, UA 5 5     Leukocytes, UA Large     Nitrite, UA Negative     Protein, UA 30 (1+) mg/dl      Glucose, UA Negative mg/dl      Ketones, UA Negative mg/dl      Urobilinogen, UA <2 0 mg/dl      Bilirubin, UA Negative     Occult Blood, UA Trace                 No orders to display         Procedures  Procedures      ED Course                             SBIRT 22yo+    Flowsheet Row Most Recent Value   SBIRT (25 yo +)    In order to provide better care to our patients, we are screening all of our patients for alcohol and drug use  Would it be okay to ask you these screening questions? No Filed at: 12/13/2022 2309                [facility]  Number of Diagnoses or Management Options  Diagnosis management comments: 72F presented to the emergency department for ambulatory dysfunction  Work-up including vital signs and physical exam   Patient with difficulty ambulating in the ER using a walker, plan for admission to medicine for ambulatory dysfunction  Disposition  Final diagnoses:   Ambulatory dysfunction     Time reflects when diagnosis was documented in both MDM as applicable and the Disposition within this note     Time User Action Codes Description Comment    12/13/2022 11:18 PM Renato Bert Add [R26 2] Ambulatory dysfunction     12/13/2022 11:20 PM Renato Bert Add [E66 01] Morbid obesity ([address] )     12/14/2022  6:01 AM Gaylia Lighter Modify [R26 2] Ambulatory dysfunction       ED Disposition     ED Disposition   Admit    Condition   Stable    Date/Time   Wed Dec 14, 2022  6:01 AM    Comment   Case was discussed with internal medicine and the patient's admission status was agreed to be Admission Status: observation status to the service of Dr Jaime Smiley   Follow-up Information    None         Patient's Medications   Discharge Prescriptions    No medications on file     No discharge procedures on file  PDMP Review       Value Time User    PDMP Reviewed  Yes 11/16/2022 12:00  Anibal Liao DO           ED Provider  Attending physically available and evaluated [facility]  I managed the patient along with the ED Attending      Electronically Signed by         Osman Darling MD  12/14/22 0556

## 2022-12-14 NOTE — CASE MANAGEMENT
Case Management Assessment & Discharge Planning Note    Patient name Xin Brown  Location [facility]-01 MRN [id number]  : 1950 Date 2022       Current Admission Date: 2022  Current Admission Diagnosis:Ambulatory dysfunction   Patient Active Problem List    Diagnosis Date Noted   • Abnormal urinalysis 2022   • Ambulatory dysfunction 2022   • Hyperuricemia 2022   • Acute diastolic heart failure ([address] ) 2022   • Acute bronchitis 2022   • Assistance needed with transportation 09/15/2022   • Abnormal CT of the chest 2022   • Gram-positive cocci bacteremia 2022   • Pustular psoriasis 2022   • Elevated troponin 2022   • COVID-19 2022   • Paroxysmal atrial fibrillation ([facility] ) 01/10/2022   • Hyperparathyroidism ([facility] ) 2021   • Murmur, cardiac 2021   • BPPV (benign paroxysmal positional vertigo) 2018   • Seasonal allergic rhinitis due to pollen 2018   • Lower extremity cellulitis 10/27/2017   • Osteoporosis 2016   • SIRS (systemic inflammatory response syndrome) ([facility] ) 2016   • Rheumatoid arthritis ([facility] ) 2016   • GERD (gastroesophageal reflux disease) 2016   • Sarcoid 2016   • Morbid obesity ([facility] ) 2016   • Vitamin D deficiency 2015   • Benign essential hypertension 2014   • Lumbar radiculopathy 2014      LOS (days): 1  Geometric Mean LOS (GMLOS) (days): 2 50  Days to GMLOS:1 8     OBJECTIVE:  PATIENT READMITTED TO HOSPITAL  Risk of Unplanned Readmission Score: 23 53         Current admission status: Inpatient       Preferred Pharmacy:   [address] [place] [address], [address] [place] [id number]  Phone: [phone] Fax: [phone], [address] [address]  Phone: [phone] Fax: [phone]    Primary Care Provider: Amelia Reynolds Mary Ellen Armando DO    Primary Insurance: [facility] HOSPITAL REP  Secondary Insurance:     ASSESSMENT:  [address], [address] Representative - Child   Primary Phone: [phone] (Home)               Advance Directives  Does patient have a [address]?: No  Was patient offered paperwork?: Yes (declined)  Does patient currently have a Health Care decision maker?: Yes, please see Health Care Proxy section  Does patient have Advance Directives?: No  Was patient offered paperwork?: Yes (declined)  Primary Contact: daughterHector [phone]         Readmission Root Cause  30 Day Readmission: Yes  Who directed you to return to the hospital?: Self  Did you understand whom to contact if you had questions or problems?: Yes  Did you get your prescriptions before you left the hospital?: Yes  Were you able to get your prescriptions filled when you left the hospital?: Yes  Did you take your medications as prescribed?: Yes  Were you able to get to your follow-up appointments?: Yes  During previous admission, was a post-acute recommendation made?: Yes  What post-acute resources were offered?: STR  Patient was readmitted due to: ambulatory dyfuntion  Action Plan: therapy is rec STR    Patient Information  Admitted from[de-identified] Home  Mental Status: Alert  During Assessment patient was accompanied by: Not accompanied during assessment  Assessment information provided by[de-identified] Patient  Primary Caregiver: Family  Caregiver's Name[de-identified] Carlos Schuler  Caregiver's Relationship to Patient[de-identified] Family Member  Support Systems: Daughter, Family members  Home entry access options  Select all that apply : Stairs  Number of steps to enter home  : 1  Do the steps have railings?: No  Type of Current Residence: 2 story home  Upon entering residence, is there a bedroom on the main floor (no further steps)?: No  A bedroom is located on the following floor levels of residence (select all that apply):: 2nd Floor  Upon entering residence, is there a bathroom on the main floor (no further steps)?: No  Indicate which floors of current residence have a bathroom (select all the apply):: 2nd Floor  Number of steps to basement from main floor: One Flight  In the last 12 months, was there a time when you were not able to pay the mortgage or rent on time?: No  In the last 12 months, how many places have you lived?: 1  In the last 12 months, was there a time when you did not have a steady place to sleep or slept in a shelter (including now)?: No  Homeless/housing insecurity resource given?: N/A  Living Arrangements: Lives w/ Daughter    Activities of Daily Living Prior to Admission  Functional Status: Assistance  Completes ADLs independently?: No  Level of ADL dependence: Assistance  Ambulates independently?: No  Level of ambulatory dependence: Assistance  Does patient use assisted devices?: Yes  Assisted Devices (DME) used: Straight Mack Jaxon, Bedside Commode  Does patient have a history of Outpatient Therapy (PT/OT)?: No  Does the patient have a history of Short-Term Rehab?: Yes (recently at [facility] does not want pt to return)  Does patient have a history of HHC?: Yes (1st homecare)  Does patient currently have [facility]?: No         Patient Information Continued  Income Source: Pension/MCFP  Does patient have prescription coverage?: Yes  Within the past 12 months, you worried that your food would run out before you got the money to buy more : Never true  Within the past 12 months, the food you bought just didn't last and you didn't have money to get more : Never true  Food insecurity resource given?: N/A  Does patient receive dialysis treatments?: No  Does patient have a history of substance abuse?: No  Does patient have a history of Mental Health Diagnosis?: No         Means of Transportation  Means of Transport to Appts[de-identified] Family transport  In the past 12 months, has lack of transportation kept you from medical appointments or from getting medications?: No  In the past 12 months, has lack of transportation kept you from meetings, work, or from getting things needed for daily living?: No  Was application for public transport provided?: N/A        DISCHARGE DETAILS:    Discharge planning discussed with[de-identified] patient at bedside, daughter, Wendy Ca via phone  Gretchen MASSEY  [place] of Choice: Yes  Comments - Freedom of Choice: Therapy is recommending STR  Gretchen Diaz informed CM the dtr wanted LTP but when this CM s/w daughter she reports she only wants STR for strengthening  She Agreed to blanket referrals w/in [place] miles [place] but no referrals to [address]   Referrals sent via Aidin  CM contacted family/caregiver?: Yes  Were Treatment Team discharge recommendations reviewed with patient/caregiver?: Yes  Did patient/caregiver verbalize understanding of patient care needs?: Yes       Contacts  Patient Contacts: Gadiel Katz, daughter  Relationship to Patient[de-identified] Family  Contact Method: Phone  Phone Number: [phone]  Reason/Outcome: Continuity of Care, Emergency Contact, Referral, Discharge Planning         Treatment Team Recommendation: Short Term Rehab  Discharge Destination Plan[de-identified] Short Term Rehab, SNF

## 2022-12-14 NOTE — ED NOTES
Patient given full bed bath due to the condition of skin  She was in rehab for 2 weeks and she had thick dry creams on her skin  After bed bath skin was dry and moisturized then pillowcases were place under her folds  There was some sore spots were the old creams washed off and nurse and doctor  were notified         Bren Diehl  12/14/22 [id number]

## 2022-12-14 NOTE — ASSESSMENT & PLAN NOTE
· UA with 30-50 WBC, no epifanio, large leuks   · Without urinary symptoms and blood work is normal   · Likely pyuria   · Will hold off on treating for now

## 2022-12-14 NOTE — OCCUPATIONAL THERAPY NOTE
Occupational Therapy Evaluation     Patient Name: Zander CHRISTENSEN Date: 2022  Problem List  Principal Problem:    Ambulatory dysfunction  Active Problems:    Rheumatoid arthritis ([facility] )    GERD (gastroesophageal reflux disease)    Sarcoid    Benign essential hypertension    Morbid obesity ([facility] )    Lumbar radiculopathy    Past Medical History  Past Medical History:   Diagnosis Date    Abnormal thyroid function test     last assessed: 2015     Arthritis     Caries     last assessed: 2016     Continuous opioid dependence ([facility] ) 2021    Edema of right lower extremity     last assessed: 2015     GERD (gastroesophageal reflux disease)     Hypertension     Medicare annual wellness visit, subsequent 2021    Positive blood culture 3/11/2022    Sarcoid      Past Surgical History  Past Surgical History:   Procedure Laterality Date     SECTION      MULTIPLE TOOTH EXTRACTIONS N/A 2016    Procedure: Surgical extraction of teeth 2, 18, 19, 30, 31; incision and drainage of left subperiosteal abscess ;  Surgeon: Medardo FridayDAKOTA;  [facility];  Service:          22 1030   OT Last Visit   OT Visit Date 22   Note Type   Note type Evaluation   Pain Assessment   Pain Assessment Tool FLACC   Pain Score No Pain   Pain Rating: FLACC (Rest) - Face 0   Pain Rating: FLACC (Rest) - Legs 0   Pain Rating: FLACC (Rest) - Activity 0   Pain Rating: FLACC (Rest) - Cry 0   Pain Rating: FLACC (Rest) - Consolability 0   Score: FLACC (Rest) 0   Restrictions/Precautions   Weight Bearing Precautions Per Order No   Other Precautions Fall Risk  (bariatric)   Home Living   Type of Home House  (2 , FFSU)   Bathroom Shower/Tub   (spongebathes)   Bathroom Toilet Standard   Home Equipment Walker   Additional Comments since recent rehab admission- unable to ambulate, reports was only transferring <>BSC at rehab   Prior Function   Level of Faxon Needs assistance with functional mobility; Needs assistance with ADLs; Needs assistance with IADLS   Lives With Daughter   Receives Help From Family   IADLs Family/Friend/Other provides medication management; Family/Friend/Other provides meals; Family/Friend/Other provides transportation   Falls in the last 6 months 1 to 4  (1: slide out of bed)   Vocational Retired   Lifestyle   Autonomy since recent rehab admission- unable to ambulate, reports was only transferring <>BSC at rehab, assist for ADLs/IADLs   Reciprocal Relationships supportive dtr- however dtr works   Service to Others retired   Intrinsic Gratification is fairly sedentary at this time   General   Additional Pertinent History was d/c from rehab on Sunday- difficulty managing at home   Subjective   Subjective "If i get scared I won't do anything"   ADL   Where Assessed Chair   Eating Assistance 5  Supervision/Setup   Grooming Assistance 4  Minimal Assistance   [address] 3  Moderate Assistance   LB [address] 2  Maximal Assistance   [address] 3  Moderate Assistance   LB Dressing Assistance 2  Maximal [address]  1  Total Assistance   Bed Mobility   Supine to Sit 3  Moderate assistance   Additional items Assist x 2; Increased time required;Verbal cues   Sit to Supine 3  Moderate assistance   Additional items Assist x 2; Increased time required;Verbal cues   Transfers   Sit to Stand 1  Dependent   Additional items Assist x 2; Increased time required;Verbal cues   Stand to Sit 1  Dependent   Additional items Assist x 2; Increased time required   Additional Comments unable to clear buttocks- scooted w/ max a x 2   Balance   Static Sitting Poor +   Dynamic Sitting Poor   Static Standing Zero   Activity Tolerance   Activity Tolerance Patient limited by fatigue;Patient limited by pain  (somewhat self-limiting due to fear of falling)   Medical Staff Made Aware PT Bobby Hartman 2* medical complexity/comorobities   Nurse Made Aware okay to see per RN   RUE Assessment   RUE Assessment WFL   LUE Assessment   LUE Assessment WFL   Hand Function   Gross Motor Coordination Functional   Fine Motor Coordination Functional   Cognition   Overall Cognitive Status WFL   Arousal/Participation Cooperative   Attention Within functional limits   Orientation Level Oriented X4   Memory Within functional limits   Following Commands Follows all commands and directions without difficulty   Comments pt w  fear of falling   Assessment   Limitation Decreased ADL status; Decreased Safe judgement during ADL;Decreased endurance;Decreased self-care trans;Decreased high-level ADLs   Prognosis Good   Assessment Pt is a 74 y o  YO  female admitted to [facility] on 12/13/2022 w/ ambulatory dysfxn  PT d/c from rehab on Sunday and reported weakness at [facility]  Pt  has a past medical history of Abnormal thyroid function test, Arthritis, Caries, Continuous opioid dependence ([facility] ), Edema of right lower extremity, GERD (gastroesophageal reflux disease), Hypertension, Medicare annual wellness visit, subsequent, Positive blood culture, and Sarcoid  Pt with active OT orders and up and OOB as tolerated orders  since recent rehab admission- unable to ambulate, reports was only transferring <>BSC at rehab, assist for ADLs/IADLs  Lives w' dtr who works full time Currently pt is max a for lb adls, mod a for ub adls, and depedent for sit to stand transfers  Pt is limited at this time 2*: endurance, activity tolerance, functional mobility, forward functional reach, unsupportive home environment, decreased I w/ ADLS/IADLS and decreased safety awareness  The following Occupational Performance Areas to address include: grooming, bathing/shower, toilet hygiene, dressing and functional mobility  Based on the aforementioned OT evaluation, functional performance deficits, and assessments, pt has been identified as a high complexity evaluation  From OT standpoint, anticipate d/c STR   Pt to continue to benefit from acute immediate OT services to address the following goals 2-4 times for 10-14 days The patient's raw score on the AM-PAC Daily Activity inpatient short form is 13, standardized score is 32 03, less than 39 4  Patients at this level are likely to benefit from discharge to post-acute rehabilitation services  Please refer to the recommendation of the Occupational Therapist for safe discharge planning      Goals   Patient Goals to walk again   LT Time Frame 10-14   Plan   Treatment Interventions ADL retraining;Functional transfer training; Endurance training;Patient/family training; Compensatory technique education;Continued evaluation   Goal Expiration Date 12/28/22   OT Frequency Other (comment)  (2-4)   Recommendation   OT Discharge Recommendation Post acute rehabilitation services   [facility] Daily Activity Inpatient   Lower Body Dressing 1   Bathing 1   Toileting 2   Upper Body Dressing 2   Grooming 3   Eating 4   Daily Activity Raw Score 13   Daily Activity Standardized Score (Calc for Raw Score >=11) 32 03   AM-[facility] Applied Cognition Inpatient   Following a Speech/Presentation 4   Understanding Ordinary Conversation 4   Taking Medications 4   Remembering Where Things Are Placed or Put Away 4   Remembering List of 4-5 Errands 4   Taking Care of Complicated Tasks 4   Applied Cognition Raw Score 24   Applied Cognition Standardized Score 62 21   Modified [place] Scale   Modified [place] Scale 4       GOALS    1) Pt will increase activity tolerance to G for 30 min txment sessions    2) Pt will complete UB/LB dressing/self care w/ min a using adaptive device and DME as needed    3) Pt will complete bathing w/ min a w/ use of AE and DME as needed    4) Pt will complete toileting w/ mod A w/ G hygiene/thoroughness using DME as needed    5) Pt will improve functional transfers to Mod A on/off all surfaces using DME as needed w/ G balance/safety     6) Pt will improve functional mobility during ADL/IADL/leisure tasks to Mod A using DME as needed w/ G balance/safety     7) Pt will participate in simulated IADL management task to increase independence to  w/ G safety and endurance    8) Pt will demonstrate G carryover of pt/caregiver education and training as appropriate      9) Pt will demonstrate 100% carryover of energy conservation techniques t/o functional I/ADL/leisure tasks w/o cues s/p skilled education

## 2022-12-14 NOTE — PHYSICAL THERAPY NOTE
Physical Therapy Evaluation    Patient's Name: Matt Howard    Admitting Diagnosis  Morbid obesity ([facility] ) [E66 01]  Weakness [R53 1]  Ambulatory dysfunction [R26 2]    Problem List  Patient Active Problem List   Diagnosis    SIRS (systemic inflammatory response syndrome) (HCC)    Rheumatoid arthritis ([facility] )    GERD (gastroesophageal reflux disease)    Sarcoid    Benign essential hypertension    Morbid obesity ([facility] )    Lumbar radiculopathy    Osteoporosis    Vitamin D deficiency    Lower extremity cellulitis    BPPV (benign paroxysmal positional vertigo)    Seasonal allergic rhinitis due to pollen    Hyperparathyroidism ([place] [address] )    Murmur, cardiac    Paroxysmal atrial fibrillation (HCC)    COVID-19    Elevated troponin    Gram-positive cocci bacteremia    Pustular psoriasis    Abnormal CT of the chest    Assistance needed with transportation    Acute bronchitis    Acute diastolic heart failure ([place] [address] )    Hyperuricemia    Ambulatory dysfunction       Past Medical History  Past Medical History:   Diagnosis Date    Abnormal thyroid function test     last assessed: 2015     Arthritis     Caries     last assessed: 2016     Continuous opioid dependence ([place] [address] ) 2021    Edema of right lower extremity     last assessed: 2015     GERD (gastroesophageal reflux disease)     Hypertension     Medicare annual wellness visit, subsequent 2021    Positive blood culture 3/11/2022    Sarcoid        Past Surgical History  Past Surgical History:   Procedure Laterality Date     SECTION      MULTIPLE TOOTH EXTRACTIONS N/A 2016    Procedure: Surgical extraction of teeth 2, 18, 19, 30, 31; incision and drainage of left subperiosteal abscess ;  Surgeon: Lolis Londono DMD;  [facility];  Service:           22 1035   PT Last Visit   PT Visit Date 22   Note Type   Note type Evaluation   Pain Assessment   Pain Assessment Tool 0-10   Pain Score 5   Pain Location/Orientation Location: Generalized; Location: Back; Location: Knee;Orientation: Bilateral   Patient's Stated Pain Goal No pain   Restrictions/Precautions   Weight Bearing Precautions Per Order No   Other Precautions Cognitive; Bed Alarm; Chair Alarm;Multiple lines;Telemetry; Fall Risk;Pain   Home Living   Type of Home House   Additional Comments Resides w dtr in 2 story home w/ [facility]  Has needed assist w/ ADLs recently, states she has only been able to transfer w/ A of 2 over the past few suman  Just d/c home from rehab   Prior Function   Falls in the last 6 months 1 to 4   General   Family/Caregiver Present No   Cognition   Overall Cognitive Status WFL   Arousal/Participation Arousable   Attention Attends with cues to redirect   Orientation Level Oriented X4   Memory Unable to assess   Following Commands Follows one step commands without difficulty   Subjective   Subjective cooperative, c/o pain and is fearful of falling   RLE Assessment   RLE Assessment   (strength grossly 2/5)   LLE Assessment   LLE Assessment   (strength grossly 2/5)   Coordination   Movements are Fluid and Coordinated 0   Coordination and Movement Description B LE ataxia   Bed Mobility   Supine to Sit 3  Moderate assistance   Additional items Assist x 2; Increased time required;LE management   Sit to Supine 3  Moderate assistance   Additional items Assist x 2; Increased time required;LE management   Additional Comments sat EOB x several minutes, fearful of falling  initial posterior LOB, needing mod A to maintain    decreased to CGA as session progressed   Transfers   Sit to Stand 1  Dependent   Additional items Assist x 2   Stand to Sit 1  Dependent   Additional items Assist x 2   Additional Comments unable to stand more than 10% or fully clear bed   Balance   Static Sitting Poor +   Dynamic Sitting Poor   Static Standing Zero   Activity Tolerance   Activity Tolerance Patient limited by fatigue;Patient limited by pain;Treatment limited secondary to medical complications (Comment)   Nurse Made Aware yes   Assessment   Prognosis Fair   Problem List Decreased strength;Decreased endurance; Impaired balance;Decreased mobility; Decreased cognition;Decreased coordination; Impaired judgement;Decreased safety awareness;Pain   Assessment Pt seen for physical therapy evaluation, portion of which performed as co-evaluation w/ OT due to concerns re: medical status  PT portion of evaluation focused on mobility assessment where OT portion focused more on ADLs, self care  Pt is a 75 y/o female w/ history/comorbidiites of morbid obesity, HTN, GERD, radiculopathy, PAF, RA, recent admit w/ CHF who was just d/c home from rehab, but now re-admitted w/ inability to ambulate at home since d/c from rehab  Dx is weakness, ambulatory dysfunction  Due to acute medical issues, need for hospital re-admit, pain, fall risk, note unstable clinical picture  PT consulted to assess mobility, d/c needs  Pt presents w/ decreased functional mob, sitting and standing balance/tolerance, endurance, B LE strength, barriers at home  Pt will benefit from skilled PT to correct for the above problems  Recommend return to rehab at dc   Goals   Patient Goals to be able to walk   STG Expiration Date 12/28/22   Short Term Goal #1 1-2 wks: bed mob w/ S, sitting balance to normal dynamically, transfers w/ mod A of 1, standing balance to fair - w/ device, ambulate 5-25 ft w/ RW and mod A, increase B LE strength by 1/2 -1 grade   PT Treatment Day 0   Plan   Treatment/Interventions Functional transfer training;LE strengthening/ROM; Therapeutic exercise; Endurance training;Patient/family training;Bed mobility; Equipment eval/education;Gait training   PT Frequency 3-5x/wk   Recommendation   PT Discharge Recommendation Post acute rehabilitation services   AM-PAC Basic Mobility Inpatient   Turning in Bed Without Bedrails 2   Lying on Back to Sitting on Edge of Flat Bed 1   Moving Bed to Chair 1   Standing Up From Chair 1 Walk in Room 1   Climb 3-5 Stairs 1   Basic Mobility Inpatient Raw Score 7   Highest Level Of Mobility   JH-HLM Goal 2: Bed activities/Dependent transfer   JH-HLM Achieved 3: Sit at edge of bed           Theron Samuels PT, DPT, CSRS

## 2022-12-14 NOTE — PLAN OF CARE
Problem: PHYSICAL THERAPY ADULT  Goal: Performs mobility at highest level of function for planned discharge setting  See evaluation for individualized goals  Description: Treatment/Interventions: Functional transfer training, LE strengthening/ROM, Therapeutic exercise, Endurance training, Patient/family training, Bed mobility, Equipment eval/education, Gait training          See flowsheet documentation for full assessment, interventions and recommendations  Note: Prognosis: Fair  Problem List: Decreased strength, Decreased endurance, Impaired balance, Decreased mobility, Decreased cognition, Decreased coordination, Impaired judgement, Decreased safety awareness, Pain  Assessment: Pt seen for physical therapy evaluation, portion of which performed as co-evaluation w/ OT due to concerns re: medical status  PT portion of evaluation focused on mobility assessment where OT portion focused more on ADLs, self care  Pt is a 75 y/o female w/ history/comorbidiites of morbid obesity, HTN, GERD, radiculopathy, PAF, RA, recent admit w/ CHF who was just d/c home from rehab, but now re-admitted w/ inability to ambulate at home since d/c from rehab  Dx is weakness, ambulatory dysfunction  Due to acute medical issues, need for hospital re-admit, pain, fall risk, note unstable clinical picture  PT consulted to assess mobility, d/c needs  Pt presents w/ decreased functional mob, sitting and standing balance/tolerance, endurance, B LE strength, barriers at home  Pt will benefit from skilled PT to correct for the above problems  Recommend return to rehab at [facility]        PT Discharge Recommendation: Post acute rehabilitation services    See flowsheet documentation for full assessment

## 2022-12-15 ENCOUNTER — APPOINTMENT (INPATIENT)
Dept: RADIOLOGY | Facility: HOSPITAL | Age: 72
End: 2022-12-15

## 2022-12-15 PROBLEM — M25.511 RIGHT SHOULDER PAIN: Status: ACTIVE | Noted: 2022-12-15

## 2022-12-15 LAB
ANION GAP SERPL CALCULATED.3IONS-SCNC: 7 MMOL/L (ref 4–13)
BUN SERPL-MCNC: 20 MG/DL (ref 5–25)
CALCIUM SERPL-MCNC: 9 MG/DL (ref 8.3–10.1)
CHLORIDE SERPL-SCNC: 108 MMOL/L (ref 96–108)
CO2 SERPL-SCNC: 25 MMOL/L (ref 21–32)
CREAT SERPL-MCNC: 1.43 MG/DL (ref 0.6–1.3)
ERYTHROCYTE [DISTWIDTH] IN BLOOD BY AUTOMATED COUNT: 13.7 % (ref 11.6–15.1)
GFR SERPL CREATININE-BSD FRML MDRD: 36 ML/MIN/1.73SQ M
GLUCOSE SERPL-MCNC: 147 MG/DL (ref 65–140)
HCT VFR BLD AUTO: 37.3 % (ref 34.8–46.1)
HGB BLD-MCNC: 11.4 G/DL (ref 11.5–15.4)
MCH RBC QN AUTO: 27.5 PG (ref 26.8–34.3)
MCHC RBC AUTO-ENTMCNC: 30.6 G/DL (ref 31.4–37.4)
MCV RBC AUTO: 90 FL (ref 82–98)
PLATELET # BLD AUTO: 197 THOUSANDS/UL (ref 149–390)
PMV BLD AUTO: 11 FL (ref 8.9–12.7)
POTASSIUM SERPL-SCNC: 3.9 MMOL/L (ref 3.5–5.3)
RBC # BLD AUTO: 4.14 MILLION/UL (ref 3.81–5.12)
SODIUM SERPL-SCNC: 140 MMOL/L (ref 135–147)
WBC # BLD AUTO: 7.38 THOUSAND/UL (ref 4.31–10.16)

## 2022-12-15 RX ORDER — SODIUM CHLORIDE 9 MG/ML
75 INJECTION, SOLUTION INTRAVENOUS CONTINUOUS
Status: DISPENSED | OUTPATIENT
Start: 2022-12-15 | End: 2022-12-15

## 2022-12-15 RX ORDER — FUROSEMIDE 40 MG/1
40 TABLET ORAL DAILY
Qty: 30 TABLET | Refills: 0 | Status: SHIPPED | OUTPATIENT
Start: 2022-12-15 | End: 2023-01-14

## 2022-12-15 RX ADMIN — APIXABAN 5 MG: 5 TABLET, FILM COATED ORAL at 09:44

## 2022-12-15 RX ADMIN — GABAPENTIN 100 MG: 100 CAPSULE ORAL at 09:44

## 2022-12-15 RX ADMIN — POTASSIUM CHLORIDE 10 MEQ: 750 TABLET, EXTENDED RELEASE ORAL at 09:44

## 2022-12-15 RX ADMIN — PREDNISONE 5 MG: 5 TABLET ORAL at 16:44

## 2022-12-15 RX ADMIN — CLOBETASOL PROPIONATE 1 APPLICATION: 0.5 CREAM TOPICAL at 09:44

## 2022-12-15 RX ADMIN — CLOBETASOL PROPIONATE 1 APPLICATION: 0.5 CREAM TOPICAL at 18:16

## 2022-12-15 RX ADMIN — Medication: at 21:34

## 2022-12-15 RX ADMIN — CLOTRIMAZOLE: 1 CREAM TOPICAL at 18:16

## 2022-12-15 RX ADMIN — NYSTATIN: 100000 POWDER TOPICAL at 18:16

## 2022-12-15 RX ADMIN — PREDNISONE 5 MG: 5 TABLET ORAL at 08:48

## 2022-12-15 RX ADMIN — FUROSEMIDE 40 MG: 40 TABLET ORAL at 09:48

## 2022-12-15 RX ADMIN — HYDROXYCHLOROQUINE SULFATE 200 MG: 200 TABLET ORAL at 18:16

## 2022-12-15 RX ADMIN — NYSTATIN 1 APPLICATION: 100000 POWDER TOPICAL at 09:44

## 2022-12-15 RX ADMIN — CEFTRIAXONE SODIUM 1000 MG: 10 INJECTION, POWDER, FOR SOLUTION INTRAVENOUS at 18:14

## 2022-12-15 RX ADMIN — Medication: at 16:45

## 2022-12-15 RX ADMIN — SODIUM CHLORIDE 75 ML/HR: 0.9 INJECTION, SOLUTION INTRAVENOUS at 14:30

## 2022-12-15 RX ADMIN — Medication 1000 UNITS: at 09:43

## 2022-12-15 RX ADMIN — APIXABAN 5 MG: 5 TABLET, FILM COATED ORAL at 18:15

## 2022-12-15 RX ADMIN — PANTOPRAZOLE SODIUM 20 MG: 20 TABLET, DELAYED RELEASE ORAL at 06:17

## 2022-12-15 RX ADMIN — METOPROLOL TARTRATE 25 MG: 25 TABLET, FILM COATED ORAL at 09:48

## 2022-12-15 RX ADMIN — Medication 1 APPLICATION: at 09:44

## 2022-12-15 RX ADMIN — METOPROLOL TARTRATE 25 MG: 25 TABLET, FILM COATED ORAL at 21:37

## 2022-12-15 RX ADMIN — HYDROXYCHLOROQUINE SULFATE 200 MG: 200 TABLET ORAL at 09:45

## 2022-12-15 NOTE — PROGRESS NOTES
[address] 1950, 74 y o  female MRN: [id number]  Unit/Bed#: [facility] 304-01 Encounter: [id number]  Primary Care Provider: Elver Merino DO   Date and time admitted to hospital: 12/13/2022  9:05 PM    * Ambulatory dysfunction  Assessment & Plan  · Patient recently admitted and discharged to rehab; was just discharged lately  Generally, patient is able to take few steps sideways with her walker and goes back to bed  Is for most part sedentary  · Concern patient may not be able to be cared for at home as well by daughter and may need higher level of care   · Patient is agreeable to this     Abnormal urinalysis  Assessment & Plan  · UA with 30-50 WBC, no epifanio, large leuks   · Initially not treated due to lack of symptoms, however today daughter complaining of darker urine and increased lethargy   · Will start rocephin   Sarcoid  Assessment & Plan  · Noted history   · Follows with outpatient rheum     Rheumatoid arthritis ([facility])  Assessment & Plan  · On plaquenil 200 mg BID as well as prednisone 5 mg BID   · Continue while inpatient       Lumbar radiculopathy  Assessment & Plan  · On Neurontin 100 mg in the a m  Right shoulder pain  Assessment & Plan  · Having new right shoulder pain  · Denies numbness or tingling   · Noted history of RA   · Will check R shoulder xray     Morbid obesity ([facility])  Assessment & Plan  · Needs intensive nutritional consult to promote weight loss  Benign essential hypertension  Assessment & Plan  · On furosemide 40 mg daily also take it for chronic diastolic dysfunction  · Metoprolol tartrate 25 mg every 12  GERD (gastroesophageal reflux disease)  Assessment & Plan  · On Protonix 20 mg daily  · Maalox to continue  VTE Pharmacologic Prophylaxis:   Moderate Risk (Score 3-4) - Pharmacological DVT Prophylaxis Ordered: apixaban (Eliquis)      Patient Centered Rounds: I performed bedside rounds with nursing staff today  Discussions with Specialists or Other Care Team Provider: CM     Education and Discussions with Family / Patient: Updated  (daughter) at bedside  Time Spent for Care: 30 minutes  More than 50% of total time spent on counseling and coordination of care as described above  Current Length of Stay: 2 day(s)  Current Patient Status: Inpatient   Certification Statement: The patient will continue to require additional inpatient hospital stay due to pending rehab placement   Discharge Plan: Anticipate discharge in 24-48 hrs to rehab facility  Code Status: Level 1 - Full Code    Subjective:   Patient with right shoulder pain  She does not usually have right shoulder pain associated with her RA  Of note daughter is complaining of more lethargy today and darker urine then yesterday  Objective:     Vitals:   Temp (24hrs), Av 3 °F (36 8 °C), Min:98 2 °F (36 8 °C), Max:98 4 °F (36 9 °C)    Temp:  [98 2 °F (36 8 °C)-98 4 °F (36 9 °C)] 98 4 °F (36 9 °C)  HR:  [87-96] 96  Resp:  [18] 18  BP: ()/(54-59) 135/54  SpO2:  [100 %] 100 %  Body mass index is 46 86 kg/m²  Input and Output Summary (last 24 hours): Intake/Output Summary (Last 24 hours) at 12/15/2022 1401  Last data filed at 12/15/2022 0601  Gross per 24 hour   Intake --   Output 150 ml   Net -150 ml       Physical Exam:   Physical Exam  Constitutional:       Appearance: She is obese  HENT:      Head: Normocephalic and atraumatic  Cardiovascular:      Rate and Rhythm: Normal rate and regular rhythm  Pulmonary:      Effort: Pulmonary effort is normal       Breath sounds: Normal breath sounds  Abdominal:      General: Abdomen is flat  Palpations: Abdomen is soft  Musculoskeletal:         General: Tenderness (right shoudler anteriorly  no stepoffs  decreased PROM and AROM due to pain ) present  Right lower leg: No edema  Left lower leg: No edema  Skin:     General: Skin is warm and dry  Neurological:      General: No focal deficit present  Mental Status: She is alert and oriented to person, place, and time  Psychiatric:         Mood and Affect: Mood normal           Additional Data:     Labs:  Results from last 7 days   Lab Units 12/14/22  0526   WBC Thousand/uL 6 41   HEMOGLOBIN g/dL 12 4   HEMATOCRIT % 40 7   PLATELETS Thousands/uL 186   NEUTROS PCT % 64   LYMPHS PCT % 18   MONOS PCT % 11   EOS PCT % 5     Results from last 7 days   Lab Units 12/14/22  0526   SODIUM mmol/L 145   POTASSIUM mmol/L 3 5   CHLORIDE mmol/L 112*   CO2 mmol/L 26   BUN mg/dL 21   CREATININE mg/dL 1 07   ANION GAP mmol/L 7   CALCIUM mg/dL 9 3   ALBUMIN g/dL 2 6*   TOTAL BILIRUBIN mg/dL 0 56   ALK PHOS U/L 55   ALT U/L 19   AST U/L 21   GLUCOSE RANDOM mg/dL 89                       Lines/Drains:  Invasive Devices     Peripheral Intravenous Line  Duration           Peripheral IV 12/14/22 Right;Ventral (anterior) Hand 1 day          Drain  Duration           External Urinary Catheter -- days                      Imaging: No pertinent imaging reviewed      Recent Cultures (last 7 days):         Last 24 Hours Medication List:   Current Facility-Administered Medications   Medication Dose Route Frequency Provider Last Rate   • acetaminophen  650 mg Oral Q6H PRN Efra Chiang MD     • albuterol  2 puff Inhalation Q6H PRN Efra Chiang MD     • aluminum-magnesium hydroxide-simethicone  30 mL Oral Q6H PRN Efra Chiang MD     • apixaban  5 mg Oral BID Efra Chiang MD     • cholecalciferol  1,000 Units Oral Daily Efra Chiang MD     • clobetasol  1 application Topical BID Efra Chiang MD     • clotrimazole   Topical BID Efra Chiang MD     • dextromethorphan-guaiFENesin  5 mL Oral Q6H PRN Efra Chiang MD     • furosemide  40 mg Oral Daily Efra Chiang MD     • gabapentin  100 mg Oral Daily Efra Chiang MD     • hydroxychloroquine  200 mg Oral BID Jared Jimenez MD     • metoprolol tartrate  25 mg Oral Q12H [address] Adam Jaramillo MD     • nystatin   Topical BID Jared Jimenez MD     • ondansetron  4 mg Intravenous Q6H PRN Jared Jimenez MD     • pantoprazole  20 mg Oral Early Morning Jared Jimenez MD     • polyethylene glycol  17 g Oral Daily Jared Jimenez MD     • potassium chloride  10 mEq Oral Daily Jared Jimenez MD     • predniSONE  5 mg Oral BID With Meals Jared Jimenez MD     • senna-docusate sodium  1 tablet Oral Daily Jared Jimenez MD     • white petrolatum-mineral oil   Topical TID Jared Jimenez MD          Today, Patient Was Seen By: Elinor Green PA-C    **Please Note: This note may have been constructed using a voice recognition system  **

## 2022-12-15 NOTE — CASE MANAGEMENT
Case Management Discharge Planning Note    Patient name Erin Suarez  [facility]N [id number]  : 1950 Date 12/15/2022       Current Admission Date: 2022  Current Admission Diagnosis:Ambulatory dysfunction   Patient Active Problem List    Diagnosis Date Noted   • Right shoulder pain 12/15/2022   • Abnormal urinalysis 2022   • Ambulatory dysfunction 2022   • Hyperuricemia 2022   • Acute diastolic heart failure ([address] ) 2022   • Acute bronchitis 2022   • Assistance needed with transportation 09/15/2022   • Abnormal CT of the chest 2022   • Gram-positive cocci bacteremia 2022   • Pustular psoriasis 2022   • Elevated troponin 2022   • COVID-19 2022   • Paroxysmal atrial fibrillation ([facility] ) 01/10/2022   • Hyperparathyroidism ([facility] ) 2021   • Murmur, cardiac 2021   • BPPV (benign paroxysmal positional vertigo) 2018   • Seasonal allergic rhinitis due to pollen 2018   • Lower extremity cellulitis 10/27/2017   • Osteoporosis 2016   • SIRS (systemic inflammatory response syndrome) ([facility] ) 2016   • Rheumatoid arthritis ([facility] ) 2016   • GERD (gastroesophageal reflux disease) 2016   • Sarcoid 2016   • Morbid obesity ([facility] ) 2016   • Vitamin D deficiency 2015   • Benign essential hypertension 2014   • Lumbar radiculopathy 2014      LOS (days): 2  Geometric Mean LOS (GMLOS) (days): 2 50  Days to GMLOS:0 9     OBJECTIVE:  Risk of Unplanned Readmission Score: 23 79         Current admission status: Inpatient   Preferred Pharmacy:   [address] [place], [place] [id number] [address] [address]  Phone: [phone] Fax: [phone] [address] [address]  Phone: [phone] Fax: [phone]    Primary Care Provider: Sue Rueda, DO    Primary Insurance: [facility] REP  Secondary Insurance:     DISCHARGE DETAILS:    Discharge planning discussed with[de-identified] patient and daughter at bedside  Freedom of Choice: Yes  Comments - Freedom of Choice: Daughter Amy Mcclelland reviewed list of available STR and chose [facility]     Other Referral/Resources/Interventions Provided:  Interventions: Short Term Rehab  Referral Comments: [facility] chosen by destiny Mcclelland and reserved in [phone]  This CM will follow up with [facility] to facilitate what is needed as patient is medically stable for discharge  Insurance Authorization will be needed for [facility] as well  Checking if [facility] provides the auth  CM to follow up as needed

## 2022-12-15 NOTE — PROGRESS NOTES
-- Patient: Justin Majano  -- MRN: [id number]  -- Aidin Request ID: [id number]  -- Level of care reserved: translation missing: en provider  provider_type  -- Partner Reserved:  -- Clinical needs requested:  -- Geography searched: 20 miles around [phone]  -- Start of Service:  -- Request sent: 5:10pm EST on 12/14/2022 by Lobito Tapia  -- Partner reserved: by Nyasia Chu (Remi Lager)  -- Choice list shared: 2:05pm EST on 12/15/2022 by Nyasia Chu (Remi Lager)

## 2022-12-15 NOTE — NUTRITION
12/15/22 0829   Biochemical Data,Medical Tests, and Procedures   Biochemical Data/Medical Tests/Procedures Lab values reviewed; Meds reviewed   Labs (Comment) reviewed   Meds (Comment) cholecalciferol, furosemide, potassium chloride, prednisone, ondansetron   Nutrition-Focused Physical Exam   Nutrition-Focused Physical Exam Findings RN skin assessment reviewed;Edema   Nutrition-Focused Physical Exam Findings nonpitting BLE,noted lower cellulitis   Medical-Related Concerns RA, acute diastolic heart failure, GERD, HTN, morbid obesity, ambulatory dysfunction, Vit D deficiency, sarcoid   Adequacy of Intake   Nutrition Modality PO   Feeding Route   PO Independent   Current PO Intake   Current Diet Order Cardiac low fat   Current Meal Intake 25-50%   Estimated Calorie Intake 25-50%   Estimated Protein Intake  25-50%   Estimated Fluid Intake 25-50%   Estimated calorie intake compared to estimated need PT states appetite better prior to admit, eating <50% of meals currently, refusing supplements   PES Statement   Problem Clinical   Weight (3) Overweight/obesity NC-3 3   Related to Energy intake>energy output over time   As evidenced by: BMI   Recommendations/Interventions   Malnutrition/BMI Present Yes  (Morbid obesity)   Provider already documenting morbid obesity Yes   Adult BMI Classifications Morbid Obesity 45-49 9   Interventions/Recommendations Continue current diet order;OP MNT follow up   Intervention Comments Likely weight gain partly due to ambulatory dysfunction, sedentary lifestyle,   Recommendations to Provider Poor po intake currently, PT refusing need for supplements, continue to montior po intake, consider MNT outpatient for weight management p d/c  Will continue to montior po intake, offer supplements and diet education as PT desires  Education Assessment   Education Patient declined nutrition education   Patient Nutrition Goals   Goal Avoid weight gain; Improve quality of diet   Goal Status Initiated Timeframe to complete goal by next f/u   Nutrition Complexity Risk   Nutrition complexity level Moderate risk   Follow up date 12/22/22

## 2022-12-15 NOTE — ASSESSMENT & PLAN NOTE
· Having new right shoulder pain    · Denies numbness or tingling   · Noted history of RA   · Will check R shoulder xray

## 2022-12-15 NOTE — ASSESSMENT & PLAN NOTE
· UA with 30-50 WBC, no epifanio, large leuks   · Without urinary symptoms and blood work is normal   · Will hold off on treating for now

## 2022-12-15 NOTE — PLAN OF CARE
Problem: Potential for Falls  Goal: Patient will remain free of falls  Description: INTERVENTIONS:  - Educate patient/family on patient safety including physical limitations  - Instruct patient to call for assistance with activity   - Consult OT/PT to assist with strengthening/mobility   - Keep Call bell within reach  - Keep bed low and locked with side rails adjusted as appropriate  - Keep care items and personal belongings within reach  - Initiate and maintain comfort rounds  - Make Fall Risk Sign visible to staff  - Offer Toileting every 2 Hours, in advance of need  - Initiate/Maintain bed/chair alarm  - Obtain necessary fall risk management equipment: nonskid socks  - Apply yellow socks and bracelet for high fall risk patients  - Consider moving patient to room near nurses station  Outcome: Progressing     Problem: PAIN - ADULT  Goal: Verbalizes/displays adequate comfort level or baseline comfort level  Description: Interventions:  - Encourage patient to monitor pain and request assistance  - Assess pain using appropriate pain scale  - Administer analgesics based on type and severity of pain and evaluate response  - Implement non-pharmacological measures as appropriate and evaluate response  - Consider cultural and social influences on pain and pain management  - Notify physician/advanced practitioner if interventions unsuccessful or patient reports new pain  Outcome: Progressing     Problem: INFECTION - ADULT  Goal: Absence or prevention of progression during hospitalization  Description: INTERVENTIONS:  - Assess and monitor for signs and symptoms of infection  - Monitor lab/diagnostic results  - Monitor all insertion sites, i e  indwelling lines, tubes, and drains  - Monitor endotracheal if appropriate and nasal secretions for changes in amount and color  - [place] appropriate cooling/warming therapies per order  - Administer medications as ordered  - Instruct and encourage patient and family to use good hand hygiene technique  - Identify and instruct in appropriate isolation precautions for identified infection/condition  Outcome: Progressing  Goal: Absence of fever/infection during neutropenic period  Description: INTERVENTIONS:  - Monitor WBC    Outcome: Progressing     Problem: SAFETY ADULT  Goal: Patient will remain free of falls  Description: INTERVENTIONS:  - Educate patient/family on patient safety including physical limitations  - Instruct patient to call for assistance with activity   - Consult OT/PT to assist with strengthening/mobility   - Keep Call bell within reach  - Keep bed low and locked with side rails adjusted as appropriate  - Keep care items and personal belongings within reach  - Initiate and maintain comfort rounds  - Make Fall Risk Sign visible to staff  - Offer Toileting every 2 Hours, in advance of need  - Initiate/Maintain bed  chair alarm  - Obtain necessary fall risk management equipment: nonskid socks  - Apply yellow socks and bracelet for high fall risk patients  - Consider moving patient to room near nurses station  Outcome: Progressing  Goal: Maintain or return to baseline ADL function  Description: INTERVENTIONS:  -  Assess patient's ability to carry out ADLs; assess patient's baseline for ADL function and identify physical deficits which impact ability to perform ADLs (bathing, care of mouth/teeth, toileting, grooming, dressing, etc )  - Assess/evaluate cause of self-care deficits   - Assess range of motion  - Assess patient's mobility; develop plan if impaired  - Assess patient's need for assistive devices and provide as appropriate  - Encourage maximum independence but intervene and supervise when necessary  - Involve family in performance of ADLs  - Assess for home care needs following discharge   - Consider OT consult to assist with ADL evaluation and planning for discharge  - Provide patient education as appropriate  Outcome: Progressing  Goal: Maintains/Returns to pre admission functional level  Description: INTERVENTIONS:  - Perform BMAT or MOVE assessment daily    - Set and communicate daily mobility goal to care team and patient/family/caregiver  - Collaborate with rehabilitation services on mobility goals if consulted  - Perform Range of Motion 3 times a day  - Reposition patient every 2 hours  - Dangle patient 3 times a day  - Stand patient 3 times a day  - Ambulate patient 3 times a day  - Out of bed to chair 3 times a day   - Out of bed for meals 3 times a day  - Out of bed for toileting  - Record patient progress and toleration of activity level   Outcome: Progressing     Problem: DISCHARGE PLANNING  Goal: Discharge to home or other facility with appropriate resources  Description: INTERVENTIONS:  - Identify barriers to discharge w/patient and caregiver  - Arrange for needed discharge resources and transportation as appropriate  - Identify discharge learning needs (meds, wound care, etc )  - Arrange for interpretive services to assist at discharge as needed  - Refer to Case Management Department for coordinating discharge planning if the patient needs post-hospital services based on physician/advanced practitioner order or complex needs related to functional status, cognitive ability, or social support system  Outcome: Progressing     Problem: Knowledge Deficit  Goal: Patient/family/caregiver demonstrates understanding of disease process, treatment plan, medications, and discharge instructions  Description: Complete learning assessment and assess knowledge base    Interventions:  - Provide teaching at level of understanding  - Provide teaching via preferred learning methods  Outcome: Progressing     Problem: MOBILITY - ADULT  Goal: Maintain or return to baseline ADL function  Description: INTERVENTIONS:  -  Assess patient's ability to carry out ADLs; assess patient's baseline for ADL function and identify physical deficits which impact ability to perform ADLs (bathing, care of mouth/teeth, toileting, grooming, dressing, etc )  - Assess/evaluate cause of self-care deficits   - Assess range of motion  - Assess patient's mobility; develop plan if impaired  - Assess patient's need for assistive devices and provide as appropriate  - Encourage maximum independence but intervene and supervise when necessary  - Involve family in performance of ADLs  - Assess for home care needs following discharge   - Consider OT consult to assist with ADL evaluation and planning for discharge  - Provide patient education as appropriate  Outcome: Progressing  Goal: Maintains/Returns to pre admission functional level  Description: INTERVENTIONS:  - Perform BMAT or MOVE assessment daily    - Set and communicate daily mobility goal to care team and patient/family/caregiver  - Collaborate with rehabilitation services on mobility goals if consulted  - Perform Range of Motion 3 times a day  - Reposition patient every 2 hours    - Dangle patient 3 times a day  - Stand patient 3 times a day  - Ambulate patient 3 times a day  - Out of bed to chair 3 times a day   - Out of bed for meals 3 times a day  - Out of bed for toileting  - Record patient progress and toleration of activity level   Outcome: Progressing     Problem: Prexisting or High Potential for Compromised Skin Integrity  Goal: Skin integrity is maintained or improved  Description: INTERVENTIONS:  - Identify patients at risk for skin breakdown  - Assess and monitor skin integrity  - Assess and monitor nutrition and hydration status  - Monitor labs   - Assess for incontinence   - Turn and reposition patient  - Assist with mobility/ambulation  - Relieve pressure over bony prominences  - Avoid friction and shearing  - Provide appropriate hygiene as needed including keeping skin clean and dry  - Evaluate need for skin moisturizer/barrier cream  - Collaborate with interdisciplinary team   - Patient/family teaching  - Consider wound care consult   Outcome: Progressing     Problem: Nutrition/Hydration-ADULT  Goal: Nutrient/Hydration intake appropriate for improving, restoring or maintaining nutritional needs  Description: Monitor and assess patient's nutrition/hydration status for malnutrition  Collaborate with interdisciplinary team and initiate plan and interventions as ordered  Monitor patient's weight and dietary intake as ordered or per policy  Utilize nutrition screening tool and intervene as necessary  Determine patient's food preferences and provide high-protein, high-caloric foods as appropriate       INTERVENTIONS:  - Monitor oral intake, urinary output, labs, and treatment plans  - Assess nutrition and hydration status and recommend course of action  - Evaluate amount of meals eaten  - Assist patient with eating if necessary   - Allow adequate time for meals  - Recommend/ encourage appropriate diets, oral nutritional supplements, and vitamin/mineral supplements  - Order, calculate, and assess calorie counts as needed  - Recommend, monitor, and adjust tube feedings and TPN/PPN based on assessed needs  - Assess need for intravenous fluids  - Provide specific nutrition/hydration education as appropriate  - Include patient/family/caregiver in decisions related to nutrition  Outcome: Progressing

## 2022-12-15 NOTE — UTILIZATION REVIEW
Continued Stay Review    Date: 12/14/2022 Day 2 IP:                        Current Patient Class: IP Current Level of Care: MS    HPI:72 y o  female initially admitted on 12/13    Assessment/Plan: Pt still c/o b/l pain in her knees  UA with 30-50 WBC, no epifanio, large leuks, no urinary symptoms  Hold off tx for now  PT/OT recommending post acute rehab  cont plaquenil 200 mg BID and prednisone 5 mg BID  CM working for placement      Vital Signs:   Date/Time Temp Pulse Resp BP MAP (mmHg) SpO2 O2 Device Patient Position - Orthostatic VS   12/14/22 2100 -- -- -- 110/55 -- -- -- --   12/14/22 1500 98 2 °F (36 8 °C) 87 18 125/59 85 100 % None (Room air) Lying   12/14/22 0800 98 3 °F (36 8 °C) 82 16 114/56 -- 99 % -- --   12/14/22 0600 -- 76 18 130/57 82 97 % None (Room air) --   12/14/22 0500 -- 76 18 138/65 93 98 % None (Room air) --   12/14/22 0400 -- 72 18 144/65 88 96 % None (Room air) --   12/14/22 0300 -- 76 18 150/64 92 97 % None (Room air) --   12/14/22 0200 -- 68 18 144/64 92 97 % None (Room air) --   12/14/22 0045 -- 80 18 169/72 103 99 % None (Room air) --   12/14/22 0044 -- 81 -- 169/72 -- -- -- --       Pertinent Labs/Diagnostic Results:       Results from last 7 days   Lab Units 12/14/22  0526 12/14/22  0050   WBC Thousand/uL 6 41  --    HEMOGLOBIN g/dL 12 4  --    HEMATOCRIT % 40 7  --    PLATELETS Thousands/uL 186 188   NEUTROS ABS Thousands/µL 4 16  --          Results from last 7 days   Lab Units 12/14/22  0526   SODIUM mmol/L 145   POTASSIUM mmol/L 3 5   CHLORIDE mmol/L 112*   CO2 mmol/L 26   ANION GAP mmol/L 7   BUN mg/dL 21   CREATININE mg/dL 1 07   EGFR ml/min/1 73sq m 51   CALCIUM mg/dL 9 3   MAGNESIUM mg/dL 2 0   PHOSPHORUS mg/dL 2 7     Results from last 7 days   Lab Units 12/14/22  0526   AST U/L 21   ALT U/L 19   ALK PHOS U/L 55   TOTAL PROTEIN g/dL 7 2   ALBUMIN g/dL 2 6*   TOTAL BILIRUBIN mg/dL 0 56         Results from last 7 days   Lab Units 12/14/22  0526   GLUCOSE RANDOM mg/dL 89       Results from last 7 days   Lab Units 12/14/22  0526   TSH 3RD GENERATON uIU/mL 3 600           Results from last 7 days   Lab Units 12/14/22  0044   CLARITY UA  Clear   COLOR UA  Yellow   SPEC GRAV UA  1 021   PH UA  5 5   GLUCOSE UA mg/dl Negative   KETONES UA mg/dl Negative   BLOOD UA  Trace*   PROTEIN UA mg/dl 30 (1+)*   NITRITE UA  Negative   BILIRUBIN UA  Negative   UROBILINOGEN UA (BE) mg/dl <2 0   LEUKOCYTES UA  Large*   WBC UA /hpf 30-50*   RBC UA /hpf 4-10*   BACTERIA UA /hpf None Seen   EPITHELIAL CELLS WET PREP /hpf Occasional           Medications:   Scheduled Medications:  apixaban, 5 mg, Oral, BID  cholecalciferol, 1,000 Units, Oral, Daily  clobetasol, 1 application, Topical, BID  clotrimazole, , Topical, BID  furosemide, 40 mg, Oral, Daily  gabapentin, 100 mg, Oral, Daily  hydroxychloroquine, 200 mg, Oral, BID  metoprolol tartrate, 25 mg, Oral, Q12H FAMILIA  nystatin, , Topical, BID  pantoprazole, 20 mg, Oral, Early Morning  polyethylene glycol, 17 g, Oral, Daily  potassium chloride, 10 mEq, Oral, Daily  predniSONE, 5 mg, Oral, BID With Meals  senna-docusate sodium, 1 tablet, Oral, Daily  white petrolatum-mineral oil, , Topical, TID      Continuous IV Infusions:     PRN Meds:  acetaminophen, 650 mg, Oral, Q6H PRN  albuterol, 2 puff, Inhalation, Q6H PRN  aluminum-magnesium hydroxide-simethicone, 30 mL, Oral, Q6H PRN  dextromethorphan-guaiFENesin, 5 mL, Oral, Q6H PRN  ondansetron, 4 mg, Intravenous, Q6H PRN        Discharge Plan: D    Network Utilization Review Department  ATTENTION: Please call with any questions or concerns to [phone] and carefully listen to the prompts so that you are directed to the right person  All voicemails are confidential   Alcario Broad all requests for admission clinical reviews, approved or denied determinations and any other requests to dedicated fax number below belonging to the campus where the patient is receiving treatment   List of dedicated fax numbers for the Facilities:  Jacque Cohen NAME UR FAX NUMBER   ADMISSION DENIALS (Administrative/Medical Necessity) [phone]   PARENT CHILD HEALTH (Maternity/NICU/Pediatrics) Brittnee Carbajal [address] Justyna Jacobs  [phone]   [address] [place] [address] [facility] 28 [facility] [address] [facility] 134 615 [facility] [phone]

## 2022-12-16 LAB
ANION GAP SERPL CALCULATED.3IONS-SCNC: 6 MMOL/L (ref 4–13)
BUN SERPL-MCNC: 24 MG/DL (ref 5–25)
CALCIUM SERPL-MCNC: 8.9 MG/DL (ref 8.3–10.1)
CHLORIDE SERPL-SCNC: 107 MMOL/L (ref 96–108)
CO2 SERPL-SCNC: 24 MMOL/L (ref 21–32)
CREAT SERPL-MCNC: 1.33 MG/DL (ref 0.6–1.3)
ERYTHROCYTE [DISTWIDTH] IN BLOOD BY AUTOMATED COUNT: 13.5 % (ref 11.6–15.1)
GFR SERPL CREATININE-BSD FRML MDRD: 39 ML/MIN/1.73SQ M
GLUCOSE SERPL-MCNC: 87 MG/DL (ref 65–140)
HCT VFR BLD AUTO: 38 % (ref 34.8–46.1)
HGB BLD-MCNC: 11.7 G/DL (ref 11.5–15.4)
MCH RBC QN AUTO: 27.9 PG (ref 26.8–34.3)
MCHC RBC AUTO-ENTMCNC: 30.8 G/DL (ref 31.4–37.4)
MCV RBC AUTO: 91 FL (ref 82–98)
PLATELET # BLD AUTO: 183 THOUSANDS/UL (ref 149–390)
PMV BLD AUTO: 12.2 FL (ref 8.9–12.7)
POTASSIUM SERPL-SCNC: 4.1 MMOL/L (ref 3.5–5.3)
RBC # BLD AUTO: 4.2 MILLION/UL (ref 3.81–5.12)
SODIUM SERPL-SCNC: 137 MMOL/L (ref 135–147)
WBC # BLD AUTO: 7.01 THOUSAND/UL (ref 4.31–10.16)

## 2022-12-16 RX ORDER — LIDOCAINE 50 MG/G
2 PATCH TOPICAL DAILY
Status: DISCONTINUED | OUTPATIENT
Start: 2022-12-17 | End: 2022-12-20 | Stop reason: HOSPADM

## 2022-12-16 RX ADMIN — NYSTATIN: 100000 POWDER TOPICAL at 08:52

## 2022-12-16 RX ADMIN — SENNOSIDES AND DOCUSATE SODIUM 1 TABLET: 8.6; 5 TABLET ORAL at 08:51

## 2022-12-16 RX ADMIN — CLOTRIMAZOLE: 1 CREAM TOPICAL at 17:24

## 2022-12-16 RX ADMIN — APIXABAN 5 MG: 5 TABLET, FILM COATED ORAL at 08:51

## 2022-12-16 RX ADMIN — HYDROXYCHLOROQUINE SULFATE 200 MG: 200 TABLET ORAL at 08:52

## 2022-12-16 RX ADMIN — PREDNISONE 5 MG: 5 TABLET ORAL at 08:52

## 2022-12-16 RX ADMIN — METOPROLOL TARTRATE 25 MG: 25 TABLET, FILM COATED ORAL at 08:51

## 2022-12-16 RX ADMIN — Medication: at 17:23

## 2022-12-16 RX ADMIN — NYSTATIN: 100000 POWDER TOPICAL at 17:24

## 2022-12-16 RX ADMIN — PANTOPRAZOLE SODIUM 20 MG: 20 TABLET, DELAYED RELEASE ORAL at 05:49

## 2022-12-16 RX ADMIN — Medication 1000 UNITS: at 08:51

## 2022-12-16 RX ADMIN — HYDROXYCHLOROQUINE SULFATE 200 MG: 200 TABLET ORAL at 17:22

## 2022-12-16 RX ADMIN — CLOBETASOL PROPIONATE 1 APPLICATION: 0.5 CREAM TOPICAL at 17:24

## 2022-12-16 RX ADMIN — FUROSEMIDE 40 MG: 40 TABLET ORAL at 08:51

## 2022-12-16 RX ADMIN — POTASSIUM CHLORIDE 10 MEQ: 750 TABLET, EXTENDED RELEASE ORAL at 08:51

## 2022-12-16 RX ADMIN — APIXABAN 5 MG: 5 TABLET, FILM COATED ORAL at 17:22

## 2022-12-16 RX ADMIN — GABAPENTIN 100 MG: 100 CAPSULE ORAL at 08:51

## 2022-12-16 RX ADMIN — ACETAMINOPHEN 650 MG: 325 TABLET, FILM COATED ORAL at 17:27

## 2022-12-16 RX ADMIN — CEFTRIAXONE SODIUM 1000 MG: 10 INJECTION, POWDER, FOR SOLUTION INTRAVENOUS at 15:17

## 2022-12-16 RX ADMIN — POLYETHYLENE GLYCOL 3350 17 G: 17 POWDER, FOR SOLUTION ORAL at 08:51

## 2022-12-16 RX ADMIN — PREDNISONE 5 MG: 5 TABLET ORAL at 17:22

## 2022-12-16 NOTE — PROGRESS NOTES
[facility]  Progress Note - Quyen Pappas 1950, 74 y o  female MRN: [id number]  Unit/Bed#: [facility] 304-01 Encounter: [id number]  Primary Care Provider: Juany Due, DO   Date and time admitted to hospital: 12/13/2022  9:05 PM    * Ambulatory dysfunction  Assessment & Plan  Ambulatory dysfunction   Multifactorial  Likely deconditioning contributing  Safe ambulation  Fall precautions  Physical therapy      Right shoulder pain  Assessment & Plan  · X-ray without acute abnormality  · Analgesics, supportive care    Abnormal urinalysis  Assessment & Plan  · Monitor    Lumbar radiculopathy  Assessment & Plan  · On Neurontin 100 mg in the a m  Morbid obesity ([address] )  Assessment & Plan  Therapeutic lifestyle modification encouraged      Benign essential hypertension  Assessment & Plan  · Monitor blood pressures  · Avoid hypotension    Sarcoid  Assessment & Plan  · Noted history   · Follows with outpatient rheum     GERD (gastroesophageal reflux disease)  Assessment & Plan  · On Protonix 20 mg daily  · Maalox to continue  Rheumatoid arthritis (HCC)  Assessment & Plan  · On plaquenil 200 mg BID as well as prednisone 5 mg BID   · Supportive care              VTE Pharmacologic Prophylaxis:   High Risk (Score >/= 5) - Pharmacological DVT Prophylaxis Ordered: apixaban (Eliquis)  Sequential Compression Devices Ordered  Patient Centered Rounds: I performed bedside rounds with nursing staff today  Discussions with Specialists or Other Care Team Provider:     Education and Discussions with Family / Patient: Patient, updated daughter Ilene Ly  Time Spent for Care: 30 minutes  More than 50% of total time spent on counseling and coordination of care as described above      Current Length of Stay: 3 day(s)  Current Patient Status: Inpatient   Certification Statement: The patient will continue to require additional inpatient hospital stay due to as outlined  Discharge Plan: pending placement - case management following    Code Status: Level 1 - Full Code    Subjective:     Comfortably sitting up in bed  Reports pain lower extremities  Encouraged out of bed into chair patient is reluctant  Encouraged incentive spirometry    Objective:     Vitals:   Temp (24hrs), Av 6 °F (37 °C), Min:98 2 °F (36 8 °C), Max:99 5 °F (37 5 °C)    Temp:  [98 2 °F (36 8 °C)-99 5 °F (37 5 °C)] 98 2 °F (36 8 °C)  HR:  [83-90] 83  Resp:  [18] 18  BP: (116-125)/(58-61) 121/61  SpO2:  [96 %-98 %] 98 %  Body mass index is 46 94 kg/m²  Input and Output Summary (last 24 hours): Intake/Output Summary (Last 24 hours) at 2022 1609  Last data filed at 2022 1300  Gross per 24 hour   Intake 1057 5 ml   Output 900 ml   Net 157 5 ml       Physical Exam:   Physical Exam       Comfortably sitting up in bed  Obese  Short thick neck  Lungs breath sounds bilaterally  Heart sounds S1-S2 noted  Heart sounds are distant  Abdomen soft nontender  Abdominal obesity noted  Awake alert obey simple commands  Lower extremity venous stasis noted  Eczematous rash noted generalized      Additional Data:     Labs:  Results from last 7 days   Lab Units 12/16/22  0438 12/15/22  1411 12/14/22  0526   WBC Thousand/uL 7 01   < > 6 41   HEMOGLOBIN g/dL 11 7   < > 12 4   HEMATOCRIT % 38 0   < > 40 7   PLATELETS Thousands/uL 183   < > 186   NEUTROS PCT %  --   --  64   LYMPHS PCT %  --   --  18   MONOS PCT %  --   --  11   EOS PCT %  --   --  5    < > = values in this interval not displayed       Results from last 7 days   Lab Units 12/16/22  0438 12/15/22  14122  0526   SODIUM mmol/L 137   < > 145   POTASSIUM mmol/L 4 1   < > 3 5   CHLORIDE mmol/L 107   < > 112*   CO2 mmol/L 24   < > 26   BUN mg/dL 24   < > 21   CREATININE mg/dL 1 33*   < > 1 07   ANION GAP mmol/L 6   < > 7   CALCIUM mg/dL 8 9   < > 9 3   ALBUMIN g/dL  --   --  2 6*   TOTAL BILIRUBIN mg/dL  --   --  0 56   ALK PHOS U/L  --   --  55   ALT U/L  --   --  19   AST U/L  --   --  21   GLUCOSE RANDOM mg/dL 87   < > 89    < > = values in this interval not displayed                         Lines/Drains:  Invasive Devices     Peripheral Intravenous Line  Duration           Peripheral IV 12/14/22 Right;Ventral (anterior) Hand 2 days          Drain  Duration           External Urinary Catheter -- days    External Urinary Catheter <1 day                      Imaging: Reviewed radiology reports from this admission including: xray(s)    Recent Cultures (last 7 days):         Last 24 Hours Medication List:   Current Facility-Administered Medications   Medication Dose Route Frequency Provider Last Rate   • acetaminophen  650 mg Oral Q6H PRN Leona Matt MD     • albuterol  2 puff Inhalation Q6H PRN Leona Matt MD     • aluminum-magnesium hydroxide-simethicone  30 mL Oral Q6H PRN Leona Matt MD     • apixaban  5 mg Oral BID Leona Matt MD     • cefTRIAXone  1,000 mg Intravenous Q24H Phuc Coley PA-C 1,000 mg (12/16/22 1517)   • cholecalciferol  1,000 Units Oral Daily Leona Matt MD     • clobetasol  1 application Topical BID Leona Matt MD     • clotrimazole   Topical BID Leona Matt MD     • dextromethorphan-guaiFENesin  5 mL Oral Q6H PRN Leona Matt MD     • furosemide  40 mg Oral Daily Leona Matt MD     • gabapentin  100 mg Oral Daily Leona Matt MD     • hydroxychloroquine  200 mg Oral BID Leona Matt MD     • [START ON 12/17/2022] lidocaine  2 patch Topical Daily Paris Monroe MD     • metoprolol tartrate  25 mg Oral Q12H [address] Irish Moore MD     • nystatin   Topical BID Leona Matt MD     • ondansetron  4 mg Intravenous Q6H PRN Leona Matt MD     • pantoprazole  20 mg Oral Early Morning Leona Matt MD     • polyethylene glycol  17 g Oral Daily Leona Mtat MD     • potassium chloride  10 mEq Oral Daily Leona Matt MD     • predniSONE  5 mg Oral BID With Meals Maria Del Carmen Love MD     • senna-docusate sodium  1 tablet Oral Daily Maria Del Carmen Love MD     • white petrolatum-mineral oil   Topical TID Maria Del Carmen Love MD          Today, Patient Was Seen By: Zhen Manzano MD    **Please Note: This note may have been constructed using a voice recognition system  **

## 2022-12-16 NOTE — ASSESSMENT & PLAN NOTE
Ambulatory dysfunction   Multifactorial  Likely deconditioning contributing  Safe ambulation  Fall precautions  Physical therapy

## 2022-12-17 RX ADMIN — CEFTRIAXONE SODIUM 1000 MG: 10 INJECTION, POWDER, FOR SOLUTION INTRAVENOUS at 14:20

## 2022-12-17 RX ADMIN — PREDNISONE 5 MG: 5 TABLET ORAL at 09:04

## 2022-12-17 RX ADMIN — CLOTRIMAZOLE: 1 CREAM TOPICAL at 08:32

## 2022-12-17 RX ADMIN — CLOBETASOL PROPIONATE 1 APPLICATION: 0.5 CREAM TOPICAL at 17:10

## 2022-12-17 RX ADMIN — HYDROXYCHLOROQUINE SULFATE 200 MG: 200 TABLET ORAL at 17:09

## 2022-12-17 RX ADMIN — NYSTATIN: 100000 POWDER TOPICAL at 09:03

## 2022-12-17 RX ADMIN — CLOBETASOL PROPIONATE 1 APPLICATION: 0.5 CREAM TOPICAL at 08:32

## 2022-12-17 RX ADMIN — Medication 1000 UNITS: at 08:31

## 2022-12-17 RX ADMIN — METOPROLOL TARTRATE 25 MG: 25 TABLET, FILM COATED ORAL at 21:05

## 2022-12-17 RX ADMIN — HYDROXYCHLOROQUINE SULFATE 200 MG: 200 TABLET ORAL at 08:57

## 2022-12-17 RX ADMIN — PREDNISONE 5 MG: 5 TABLET ORAL at 17:09

## 2022-12-17 RX ADMIN — POTASSIUM CHLORIDE 10 MEQ: 750 TABLET, EXTENDED RELEASE ORAL at 08:31

## 2022-12-17 RX ADMIN — Medication: at 09:03

## 2022-12-17 RX ADMIN — APIXABAN 5 MG: 5 TABLET, FILM COATED ORAL at 08:31

## 2022-12-17 RX ADMIN — GABAPENTIN 100 MG: 100 CAPSULE ORAL at 08:31

## 2022-12-17 RX ADMIN — CLOTRIMAZOLE: 1 CREAM TOPICAL at 17:10

## 2022-12-17 RX ADMIN — Medication: at 00:03

## 2022-12-17 RX ADMIN — NYSTATIN: 100000 POWDER TOPICAL at 17:10

## 2022-12-17 RX ADMIN — PANTOPRAZOLE SODIUM 20 MG: 20 TABLET, DELAYED RELEASE ORAL at 06:00

## 2022-12-17 RX ADMIN — APIXABAN 5 MG: 5 TABLET, FILM COATED ORAL at 17:08

## 2022-12-17 RX ADMIN — Medication: at 17:08

## 2022-12-17 RX ADMIN — Medication: at 21:07

## 2022-12-17 NOTE — PROGRESS NOTES
[facility]  Progress Note - Abril Mcconnell 1950, 74 y o  female MRN: [id number]  Unit/Bed#: [facility] 313-01 Encounter: [id number]  Primary Care Provider: Bettie Nair DO   Date and time admitted to hospital: 12/13/2022  9:05 PM    * Ambulatory dysfunction  Assessment & Plan  Ambulatory dysfunction   Multifactorial  Likely deconditioning contributing  Safe ambulation  Fall precautions  Physical therapy      Right shoulder pain  Assessment & Plan  · X-ray without acute abnormality  · Analgesics, supportive care    Abnormal urinalysis  Assessment & Plan  · Monitor    Lumbar radiculopathy  Assessment & Plan  · On Neurontin 100 mg in the a m  Morbid obesity ([address] )  Assessment & Plan  Therapeutic lifestyle modification encouraged      Benign essential hypertension  Assessment & Plan  · Monitor blood pressures  · Avoid hypotension    Sarcoid  Assessment & Plan  · Noted history   · Follows with outpatient rheum     GERD (gastroesophageal reflux disease)  Assessment & Plan  · On Protonix 20 mg daily  · Maalox to continue  Rheumatoid arthritis (HCC)  Assessment & Plan  · On plaquenil 200 mg BID as well as prednisone 5 mg BID   · Supportive care            VTE Pharmacologic Prophylaxis:   High Risk (Score >/= 5) - Pharmacological DVT Prophylaxis Ordered: apixaban (Eliquis)  Sequential Compression Devices Ordered  Patient Centered Rounds: I performed bedside rounds with nursing staff today  Discussions with Specialists or Other Care Team Provider:     Education and Discussions with Family / Patient: Patient, called updated daughter Camellia Lundborg  Time Spent for Care: 30 minutes  More than 50% of total time spent on counseling and coordination of care as described above      Current Length of Stay: 4 day(s)  Current Patient Status: Inpatient   Certification Statement: The patient will continue to require additional inpatient hospital stay due to As outlined  Discharge Plan: Pending placement Case management following    Code Status: Level 1 - Full Code    Subjective:     Comfortably in bed  Reports feeling okay  Encouraged incentive spirometry    Objective:     Vitals:   Temp (24hrs), Av 4 °F (36 9 °C), Min:98 2 °F (36 8 °C), Max:98 5 °F (36 9 °C)    Temp:  [98 2 °F (36 8 °C)-98 5 °F (36 9 °C)] 98 5 °F (36 9 °C)  HR:  [77-82] 82  Resp:  [18-20] 20  BP: (102-107)/(51-55) 102/51  SpO2:  [95 %-97 %] 97 %  Body mass index is 46 94 kg/m²  Input and Output Summary (last 24 hours):   No intake or output data in the 24 hours ending 22 1531    Physical Exam:   Physical Exam       Comfortably in bed  Obese  Short thick neck    lungs diminished breath sounds bilaterally  Heart sounds S1-S2 noted  Heart sounds are distant  Abdomen soft nontender  Abdominal obesity noted  Awake obey simple commands  Lower extremity venous stasis changes noted  Skin seborrheic dermatitis noted    Additional Data:     Labs:  Results from last 7 days   Lab Units 12/16/22  0438 12/15/22  1411 22  0526   WBC Thousand/uL 7 01   < > 6 41   HEMOGLOBIN g/dL 11 7   < > 12 4   HEMATOCRIT % 38 0   < > 40 7   PLATELETS Thousands/uL 183   < > 186   NEUTROS PCT %  --   --  64   LYMPHS PCT %  --   --  18   MONOS PCT %  --   --  11   EOS PCT %  --   --  5    < > = values in this interval not displayed  Results from last 7 days   Lab Units 12/16/22  0438 12/15/22  14122  0526   SODIUM mmol/L 137   < > 145   POTASSIUM mmol/L 4 1   < > 3 5   CHLORIDE mmol/L 107   < > 112*   CO2 mmol/L 24   < > 26   BUN mg/dL 24   < > 21   CREATININE mg/dL 1 33*   < > 1 07   ANION GAP mmol/L 6   < > 7   CALCIUM mg/dL 8 9   < > 9 3   ALBUMIN g/dL  --   --  2 6*   TOTAL BILIRUBIN mg/dL  --   --  0 56   ALK PHOS U/L  --   --  55   ALT U/L  --   --  19   AST U/L  --   --  21   GLUCOSE RANDOM mg/dL 87   < > 89    < > = values in this interval not displayed                         Lines/Drains:  Invasive Devices     Peripheral Intravenous Line  Duration           Peripheral IV 12/14/22 Right;Ventral (anterior) Hand 3 days          Drain  Duration           External Urinary Catheter -- days    External Urinary Catheter 1 day                      Imaging: No pertinent imaging reviewed      Recent Cultures (last 7 days):   Results from last 7 days   Lab Units 12/15/22  2005   URINE CULTURE  >100,000 cfu/ml Klebsiella pneumoniae*       Last 24 Hours Medication List:   Current Facility-Administered Medications   Medication Dose Route Frequency Provider Last Rate   • acetaminophen  650 mg Oral Q6H PRN Liat Sam MD     • albuterol  2 puff Inhalation Q6H PRN Liat Sam MD     • aluminum-magnesium hydroxide-simethicone  30 mL Oral Q6H PRN Liat Sam MD     • apixaban  5 mg Oral BID Liat Sam MD     • cholecalciferol  1,000 Units Oral Daily Liat Sam MD     • clobetasol  1 application Topical BID Liat Sam MD     • clotrimazole   Topical BID Liat Sam MD     • dextromethorphan-guaiFENesin  5 mL Oral Q6H PRN Liat Sam MD     • furosemide  40 mg Oral Daily Liat Sam MD     • gabapentin  100 mg Oral Daily Liat Sam MD     • hydrocortisone   Topical 4x Daily PRN Guy Albarran MD     • hydroxychloroquine  200 mg Oral BID Liat Sam MD     • lidocaine  2 patch Topical Daily Guy Albarran MD     • metoprolol tartrate  25 mg Oral Q12H [address] Liat Sam MD     • nystatin   Topical BID Liat Sam MD     • ondansetron  4 mg Intravenous Q6H PRN Liat Sam MD     • pantoprazole  20 mg Oral Early Morning Liat Sam MD     • polyethylene glycol  17 g Oral Daily Liat Sam MD     • potassium chloride  10 mEq Oral Daily Liat Sam MD     • predniSONE  5 mg Oral BID With Meals Liat Sam MD     • senna-docusate sodium  1 tablet Oral Daily Liat Sam MD     • white petrolatum-mineral oil   Topical TID Leona Matt MD          Today, Patient Was Seen By: Paris Monroe MD    **Please Note: This note may have been constructed using a voice recognition system  **

## 2022-12-17 NOTE — PLAN OF CARE
Problem: Potential for Falls  Goal: Patient will remain free of falls  Description: INTERVENTIONS:  - Educate patient/family on patient safety including physical limitations  - Instruct patient to call for assistance with activity   - Consult OT/PT to assist with strengthening/mobility   - Keep Call bell within reach  - Keep bed low and locked with side rails adjusted as appropriate  - Keep care items and personal belongings within reach  - Initiate and maintain comfort rounds  - Make Fall Risk Sign visible to staff  - Offer Toileting every 2 Hours, in advance of need  - Initiate/Maintain bed alarm  - Apply yellow socks and bracelet for high fall risk patients  - Consider moving patient to room near nurses station  Outcome: Progressing     Problem: PAIN - ADULT  Goal: Verbalizes/displays adequate comfort level or baseline comfort level  Description: Interventions:  - Encourage patient to monitor pain and request assistance  - Assess pain using appropriate pain scale  - Administer analgesics based on type and severity of pain and evaluate response  - Implement non-pharmacological measures as appropriate and evaluate response  - Consider cultural and social influences on pain and pain management  - Notify physician/advanced practitioner if interventions unsuccessful or patient reports new pain  Outcome: Progressing     Problem: INFECTION - ADULT  Goal: Absence or prevention of progression during hospitalization  Description: INTERVENTIONS:  - Assess and monitor for signs and symptoms of infection  - Monitor lab/diagnostic results  - Monitor all insertion sites, i e  indwelling lines, tubes, and drains  - Monitor endotracheal if appropriate and nasal secretions for changes in amount and color  - [place] appropriate cooling/warming therapies per order  - Administer medications as ordered  - Instruct and encourage patient and family to use good hand hygiene technique  - Identify and instruct in appropriate isolation precautions for identified infection/condition  Outcome: Progressing  Goal: Absence of fever/infection during neutropenic period  Description: INTERVENTIONS:  - Monitor WBC    Outcome: Progressing     Problem: SAFETY ADULT  Goal: Patient will remain free of falls  Description: INTERVENTIONS:  - Educate patient/family on patient safety including physical limitations  - Instruct patient to call for assistance with activity   - Consult OT/PT to assist with strengthening/mobility   - Keep Call bell within reach  - Keep bed low and locked with side rails adjusted as appropriate  - Keep care items and personal belongings within reach  - Initiate and maintain comfort rounds  - Make Fall Risk Sign visible to staff  - Offer Toileting every 2 Hours, in advance of need  - Initiate/Maintain bed alarm  - Apply yellow socks and bracelet for high fall risk patients  - Consider moving patient to room near nurses station  Outcome: Progressing  Goal: Maintain or return to baseline ADL function  Description: INTERVENTIONS:  -  Assess patient's ability to carry out ADLs; assess patient's baseline for ADL function and identify physical deficits which impact ability to perform ADLs (bathing, care of mouth/teeth, toileting, grooming, dressing, etc )  - Assess/evaluate cause of self-care deficits   - Assess range of motion  - Assess patient's mobility; develop plan if impaired  - Assess patient's need for assistive devices and provide as appropriate  - Encourage maximum independence but intervene and supervise when necessary  - Involve family in performance of ADLs  - Assess for home care needs following discharge   - Consider OT consult to assist with ADL evaluation and planning for discharge  - Provide patient education as appropriate  Outcome: Progressing  Goal: Maintains/Returns to pre admission functional level  Description: INTERVENTIONS:  - Perform BMAT or MOVE assessment daily    - Set and communicate daily mobility goal to care team and patient/family/caregiver  - Collaborate with rehabilitation services on mobility goals if consulted  - Perform Range of Motion 2 times a day  - Reposition patient every 2 hours  - Dangle patient 2 times a day  - Stand patient 2 times a day  - Ambulate patient 2 times a day  - Out of bed to chair 2 times a day   - Out of bed for meals 2 times a day  - Out of bed for toileting  - Record patient progress and toleration of activity level   Outcome: Progressing     Problem: DISCHARGE PLANNING  Goal: Discharge to home or other facility with appropriate resources  Description: INTERVENTIONS:  - Identify barriers to discharge w/patient and caregiver  - Arrange for needed discharge resources and transportation as appropriate  - Identify discharge learning needs (meds, wound care, etc )  - Arrange for interpretive services to assist at discharge as needed  - Refer to Case Management Department for coordinating discharge planning if the patient needs post-hospital services based on physician/advanced practitioner order or complex needs related to functional status, cognitive ability, or social support system  Outcome: Progressing     Problem: Knowledge Deficit  Goal: Patient/family/caregiver demonstrates understanding of disease process, treatment plan, medications, and discharge instructions  Description: Complete learning assessment and assess knowledge base    Interventions:  - Provide teaching at level of understanding  - Provide teaching via preferred learning methods  Outcome: Progressing     Problem: MOBILITY - ADULT  Goal: Maintain or return to baseline ADL function  Description: INTERVENTIONS:  -  Assess patient's ability to carry out ADLs; assess patient's baseline for ADL function and identify physical deficits which impact ability to perform ADLs (bathing, care of mouth/teeth, toileting, grooming, dressing, etc )  - Assess/evaluate cause of self-care deficits   - Assess range of motion  - Assess patient's mobility; develop plan if impaired  - Assess patient's need for assistive devices and provide as appropriate  - Encourage maximum independence but intervene and supervise when necessary  - Involve family in performance of ADLs  - Assess for home care needs following discharge   - Consider OT consult to assist with ADL evaluation and planning for discharge  - Provide patient education as appropriate  Outcome: Progressing  Goal: Maintains/Returns to pre admission functional level  Description: INTERVENTIONS:  - Perform BMAT or MOVE assessment daily    - Set and communicate daily mobility goal to care team and patient/family/caregiver  - Collaborate with rehabilitation services on mobility goals if consulted  - Perform Range of Motion 2 times a day  - Reposition patient every 2 hours    - Dangle patient 2 times a day  - Stand patient 2 times a day  - Ambulate patient 2 times a day  - Out of bed to chair 2 times a day   - Out of bed for meals 2 times a day  - Out of bed for toileting  - Record patient progress and toleration of activity level   Outcome: Progressing     Problem: Prexisting or High Potential for Compromised Skin Integrity  Goal: Skin integrity is maintained or improved  Description: INTERVENTIONS:  - Identify patients at risk for skin breakdown  - Assess and monitor skin integrity  - Assess and monitor nutrition and hydration status  - Monitor labs   - Assess for incontinence   - Turn and reposition patient  - Assist with mobility/ambulation  - Relieve pressure over bony prominences  - Avoid friction and shearing  - Provide appropriate hygiene as needed including keeping skin clean and dry  - Evaluate need for skin moisturizer/barrier cream  - Collaborate with interdisciplinary team   - Patient/family teaching  - Consider wound care consult   Outcome: Progressing     Problem: Nutrition/Hydration-ADULT  Goal: Nutrient/Hydration intake appropriate for improving, restoring or maintaining nutritional needs  Description: Monitor and assess patient's nutrition/hydration status for malnutrition  Collaborate with interdisciplinary team and initiate plan and interventions as ordered  Monitor patient's weight and dietary intake as ordered or per policy  Utilize nutrition screening tool and intervene as necessary  Determine patient's food preferences and provide high-protein, high-caloric foods as appropriate       INTERVENTIONS:  - Monitor oral intake, urinary output, labs, and treatment plans  - Assess nutrition and hydration status and recommend course of action  - Evaluate amount of meals eaten  - Assist patient with eating if necessary   - Allow adequate time for meals  - Recommend/ encourage appropriate diets, oral nutritional supplements, and vitamin/mineral supplements  - Order, calculate, and assess calorie counts as needed  - Recommend, monitor, and adjust tube feedings and TPN/PPN based on assessed needs  - Assess need for intravenous fluids  - Provide specific nutrition/hydration education as appropriate  - Include patient/family/caregiver in decisions related to nutrition  Outcome: Progressing

## 2022-12-17 NOTE — CASE MANAGEMENT
Case Management Discharge Planning Note    Patient name Quyen Pappas  Location [facility]35 MRN [id number]  : 1950 Date 2022       Current Admission Date: 2022  Current Admission Diagnosis:Ambulatory dysfunction   Patient Active Problem List    Diagnosis Date Noted   • Right shoulder pain 12/15/2022   • Abnormal urinalysis 2022   • Ambulatory dysfunction 2022   • Hyperuricemia 2022   • Acute diastolic heart failure ([address] ) 2022   • Acute bronchitis 2022   • Assistance needed with transportation 09/15/2022   • Abnormal CT of the chest 2022   • Gram-positive cocci bacteremia 2022   • Pustular psoriasis 2022   • Elevated troponin 2022   • COVID-19 2022   • Paroxysmal atrial fibrillation ([address] ) 01/10/2022   • Hyperparathyroidism ([facility] ) 2021   • Murmur, cardiac 2021   • BPPV (benign paroxysmal positional vertigo) 2018   • Seasonal allergic rhinitis due to pollen 2018   • Lower extremity cellulitis 10/27/2017   • Osteoporosis 2016   • SIRS (systemic inflammatory response syndrome) ([facility] ) 2016   • Rheumatoid arthritis ([facility] ) 2016   • GERD (gastroesophageal reflux disease) 2016   • Sarcoid 2016   • Morbid obesity ([facility] ) 2016   • Vitamin D deficiency 2015   • Benign essential hypertension 2014   • Lumbar radiculopathy 2014      LOS (days): 4  Geometric Mean LOS (GMLOS) (days): 2 50  Days to GMLOS:-1 1     OBJECTIVE:  Risk of Unplanned Readmission Score: 24 57         Current admission status: Inpatient   Preferred Pharmacy:   [address] [place] [address] #[id number] [facility], [address] [place] [id number]  Phone: [phone] Fax: [phone] [address] [address]  Phone: [phone] Fax: [phone]    Primary Care Provider: Brunilda Howell DO    Primary Insurance: [facility] REP  Secondary Insurance:     DISCHARGE DETAILS:    Per MD pt is medically stable for DC  PT/OT aware notes are needed for insurance auth  They will see pt tomorrow  CM jerald Batres at [employer] (via [employer]) insurance auth will be submitted during weekend

## 2022-12-18 LAB
BACTERIA UR CULT: ABNORMAL

## 2022-12-18 RX ORDER — GABAPENTIN 300 MG/1
300 CAPSULE ORAL
Status: DISCONTINUED | OUTPATIENT
Start: 2022-12-18 | End: 2022-12-20 | Stop reason: HOSPADM

## 2022-12-18 RX ADMIN — PREDNISONE 5 MG: 5 TABLET ORAL at 18:06

## 2022-12-18 RX ADMIN — FUROSEMIDE 40 MG: 40 TABLET ORAL at 08:22

## 2022-12-18 RX ADMIN — PANTOPRAZOLE SODIUM 20 MG: 20 TABLET, DELAYED RELEASE ORAL at 06:04

## 2022-12-18 RX ADMIN — POTASSIUM CHLORIDE 10 MEQ: 750 TABLET, EXTENDED RELEASE ORAL at 08:21

## 2022-12-18 RX ADMIN — HYDROXYCHLOROQUINE SULFATE 200 MG: 200 TABLET ORAL at 08:25

## 2022-12-18 RX ADMIN — Medication 1000 UNITS: at 08:21

## 2022-12-18 RX ADMIN — CLOTRIMAZOLE: 1 CREAM TOPICAL at 08:25

## 2022-12-18 RX ADMIN — METOPROLOL TARTRATE 25 MG: 25 TABLET, FILM COATED ORAL at 08:21

## 2022-12-18 RX ADMIN — APIXABAN 5 MG: 5 TABLET, FILM COATED ORAL at 08:21

## 2022-12-18 RX ADMIN — GABAPENTIN 300 MG: 300 CAPSULE ORAL at 21:23

## 2022-12-18 RX ADMIN — METOPROLOL TARTRATE 25 MG: 25 TABLET, FILM COATED ORAL at 21:23

## 2022-12-18 RX ADMIN — CLOBETASOL PROPIONATE 1 APPLICATION: 0.5 CREAM TOPICAL at 08:25

## 2022-12-18 RX ADMIN — HYDROXYCHLOROQUINE SULFATE 200 MG: 200 TABLET ORAL at 18:05

## 2022-12-18 RX ADMIN — Medication: at 18:09

## 2022-12-18 RX ADMIN — CLOTRIMAZOLE: 1 CREAM TOPICAL at 18:08

## 2022-12-18 RX ADMIN — NYSTATIN: 100000 POWDER TOPICAL at 08:25

## 2022-12-18 RX ADMIN — GABAPENTIN 100 MG: 100 CAPSULE ORAL at 08:22

## 2022-12-18 RX ADMIN — APIXABAN 5 MG: 5 TABLET, FILM COATED ORAL at 18:05

## 2022-12-18 RX ADMIN — PREDNISONE 5 MG: 5 TABLET ORAL at 08:25

## 2022-12-18 RX ADMIN — CLOBETASOL PROPIONATE 1 APPLICATION: 0.5 CREAM TOPICAL at 18:08

## 2022-12-18 RX ADMIN — NYSTATIN: 100000 POWDER TOPICAL at 18:08

## 2022-12-18 NOTE — OCCUPATIONAL THERAPY NOTE
Occupational Therapy Treatment Note     12/18/22 1001   OT Last Visit   OT Visit Date 12/18/22   Note Type   Note Type Treatment for insurance authorization   Pain Assessment   Pain Assessment Tool 0-10   Pain Score 7   Restrictions/Precautions   Weight Bearing Precautions Per Order No   Other Precautions Cognitive   Lifestyle   Autonomy since recent rehab admission- unable to ambulate, reports was only transferring <>BSC at rehab, assist for ADLs/IADLs   Reciprocal Relationships supportive dtr- however dtr works   Service to Others retired   [address] is fairly sedentary at this time   ADL   Where Assessed Edge of bed   Eating Assistance 7  Independent   Grooming Assistance 5  Supervision/Setup   Grooming Deficit Wash/dry face   LB Bathing Assistance 2  [place]; Perineal area   UB Dressing Assistance 3  Moderate Assistance   UB Dressing Deficit Thread RUE; Thread LUE;Pull around back   Toileting Assistance  2  Maximal Assistance   Bed Mobility   Supine to Sit 3  Moderate assistance   Additional items Assist x 1   Sit to Supine 3  Moderate assistance   Additional items Assist x 2   Transfers   Sit to Stand 3  Moderate assistance   Additional items Assist x 1   Stand to Sit 3  Moderate assistance   Additional items Assist x 1   Subjective   Subjective Pt stated " I have arthritis in  my knees    But I got to do this"   Cognition   Overall Cognitive Status [facility]   Arousal/Participation Cooperative   Attention Attends with cues to redirect   Orientation Level Oriented X4   Memory Unable to assess   Following Commands Follows one step commands without difficulty   Activity Tolerance   Activity Tolerance Patient tolerated treatment well   Assessment   Assessment Pt seen for Occupational Therapy session with focus on activity tolerance, bed mob, functional transfers/mob, sitting balance and tolerance and standing tolerance and balance for pt engagement in UB/LB self-care tasks  Pt cleared by DESTINI/Megan for pt participated in OT session  Pt presented supine/HOB raised pt awake/alert and agreeable to participate in therapy following pt identifiers confirmed  Pt reported her therapy goal to "walk again"  She was pleasant and cooperative all therapy requests  Pt able to tolerate siting at edge of pt bed to participate in eating breakfast  She required assist for functional transfer and LB self-care 2* pt decreased overall strength and limited activity tolerance  Pt will require post acute rehab service to continue to address these above noted pt deficit which currently impair pt ADL and functional mob  Pt return to bed post session,    and all needs within reach     Plan   Treatment Interventions ADL retraining   Goal Expiration Date 12/28/22   OT Treatment Day 1   OT Frequency Other (comment)   Recommendation   OT Discharge Recommendation Post acute rehabilitation services   AM-PAC Daily Activity Inpatient   Lower Body Dressing 1   Bathing 1   Toileting 2   Upper Body Dressing 2   Grooming 3   Eating 4   Daily Activity Raw Score 13   Daily Activity Standardized Score (Calc for Raw Score >=11) 32 03   AM-PAC Applied Cognition Inpatient   Following a Speech/Presentation 4   Understanding Ordinary Conversation 4   Taking Medications 4   Remembering Where Things Are Placed or Put Away 4   Remembering List of 4-5 Errands 4   Taking Care of Complicated Tasks 4   Applied Cognition Raw Score 24   Applied Cognition Standardized Score 62 21         [place] PHAN/CAROL

## 2022-12-18 NOTE — PROGRESS NOTES
--------------  DISCHARGE REVIEW    Payor: Christi Aisha #:  [id number]  Authorization Number: [id number]    Admit date: 5/29/21  Admit time:   1:19 PM  Discharge Date: 6/2/2021  1:31 PM     Admitting Physician:  Rashid Butcher MD  Primary Care Physi [id number]  Progress Note - Xin Brown 1950, 74 y o  female MRN: [id number]  Unit/Bed#: [facility] 313-01 Encounter: [id number]  Primary Care Provider: Chucky Swain DO   Date and time admitted to hospital: 12/13/2022  9:05 PM    * Ambulatory dysfunction  Assessment & Plan  Ambulatory dysfunction   Multifactorial  Likely deconditioning contributing  Safe ambulation  Fall precautions  Physical therapy      Right shoulder pain  Assessment & Plan  · X-ray without acute abnormality  · Analgesics, supportive care    Abnormal urinalysis  Assessment & Plan  · Monitor    Lumbar radiculopathy  Assessment & Plan  Gabapentin 100 mg a m , 300 mg at night      Morbid obesity ([address] )  Assessment & Plan  Therapeutic lifestyle modification encouraged      Benign essential hypertension  Assessment & Plan  · Monitor blood pressures  · Avoid hypotension    Sarcoid  Assessment & Plan  · Noted history   · Follows with outpatient rheum     GERD (gastroesophageal reflux disease)  Assessment & Plan  · On Protonix 20 mg daily  · Maalox to continue  Rheumatoid arthritis (HCC)  Assessment & Plan  · On plaquenil 200 mg BID as well as prednisone 5 mg BID   · Supportive care          VTE Pharmacologic Prophylaxis:   High Risk (Score >/= 5) - Pharmacological DVT Prophylaxis Ordered: apixaban (Eliquis)  Sequential Compression Devices Ordered  Patient Centered Rounds: I performed bedside rounds with nursing staff today  Discussions with Specialists or Other Care Team Provider:     Education and Discussions with Family / Patient: Patient, left a message for daughter Gary Burnette  Time Spent for Care: 30 minutes  More than 50% of total time spent on counseling and coordination of care as described above      Current Length of Stay: 5 day(s)  Current Patient Status: Inpatient   Certification Statement: The patient will continue to require additional inpatient hospital stay due to as outlined  Discharge Plan: pending placement - case management following    Code Status: Level 1 - Full Code    Subjective:     Comfortably in in bed  Reports feeling okay  Encouraged out of bed into a chair  Encouraged incentive spirometry    Objective:     Vitals:   Temp (24hrs), Av 1 °F (36 7 °C), Min:97 9 °F (36 6 °C), Max:98 3 °F (36 8 °C)    Temp:  [97 9 °F (36 6 °C)-98 3 °F (36 8 °C)] 97 9 °F (36 6 °C)  HR:  [84-92] 84  Resp:  [16-20] 16  BP: (118-149)/(58-68) 119/58  SpO2:  [95 %-97 %] 95 %  Body mass index is 46 94 kg/m²  Input and Output Summary (last 24 hours):   No intake or output data in the 24 hours ending 22 1329    Physical Exam:   Physical Exam     Comfortably in bed  Obese  Short thick neck  Lungs diminished breath sounds bilaterally  Heart sounds S1-S2 noted  Heart sounds are distant  Abdomen soft nontender  Abdominal obesity noted  Awake obey simple commands  Skin seborrheic dermatitis noted  No pedal edema    Additional Data:     Labs:  Results from last 7 days   Lab Units 12/16/22  0438 12/15/22  14122  0526   WBC Thousand/uL 7 01   < > 6 41   HEMOGLOBIN g/dL 11 7   < > 12 4   HEMATOCRIT % 38 0   < > 40 7   PLATELETS Thousands/uL 183   < > 186   NEUTROS PCT %  --   --  64   LYMPHS PCT %  --   --  18   MONOS PCT %  --   --  11   EOS PCT %  --   --  5    < > = values in this interval not displayed  Results from last 7 days   Lab Units 12/16/22  0438 12/15/22  1411 22  0526   SODIUM mmol/L 137   < > 145   POTASSIUM mmol/L 4 1   < > 3 5   CHLORIDE mmol/L 107   < > 112*   CO2 mmol/L 24   < > 26   BUN mg/dL 24   < > 21   CREATININE mg/dL 1 33*   < > 1 07   ANION GAP mmol/L 6   < > 7   CALCIUM mg/dL 8 9   < > 9 3   ALBUMIN g/dL  --   --  2 6*   TOTAL BILIRUBIN mg/dL  --   --  0 56   ALK PHOS U/L  --   --  55   ALT U/L  --   --  19   AST U/L  --   --  21   GLUCOSE RANDOM mg/dL 87   < > 89    < > = values in this interval not displayed                         Lines/Drains:  Invasive Devices noted.       XR CHEST AP PORTABLE    Result Date: 5/29/2021  CONCLUSION:  11 mm nodular opacity in the left mid lung zone suspicious for possible lung neoplasm. Follow-up chest CT recommended.         Reason for Admission: see below    Physical Exam: see b Peripheral Intravenous Line  Duration           Peripheral IV 12/14/22 Right;Ventral (anterior) Hand 4 days          Drain  Duration           External Urinary Catheter -- days    External Urinary Catheter 2 days                      Imaging: No pertinent imaging reviewed      Recent Cultures (last 7 days):   Results from last 7 days   Lab Units 12/15/22  2005   URINE CULTURE  >100,000 cfu/ml Klebsiella pneumoniae*       Last 24 Hours Medication List:   Current Facility-Administered Medications   Medication Dose Route Frequency Provider Last Rate   • acetaminophen  650 mg Oral Q6H PRN Andrew Blackwood MD     • albuterol  2 puff Inhalation Q6H PRN Andrew Blackwood MD     • aluminum-magnesium hydroxide-simethicone  30 mL Oral Q6H PRN Andrew Blackwood MD     • apixaban  5 mg Oral BID Andrew Blackwood MD     • cholecalciferol  1,000 Units Oral Daily Andrew Blackwood MD     • clobetasol  1 application Topical BID Andrew Blackwood MD     • clotrimazole   Topical BID Andrew Blackwood MD     • dextromethorphan-guaiFENesin  5 mL Oral Q6H PRN Andrew Blackwood MD     • furosemide  40 mg Oral Daily Andrew Blackowod MD     • gabapentin  100 mg Oral Daily Andrew Blackwood MD     • gabapentin  300 mg Oral HS Ingris Garcia MD     • hydrocortisone   Topical 4x Daily PRN Ingris Garcia MD     • hydroxychloroquine  200 mg Oral BID Andrew Blackwood MD     • lidocaine  2 patch Topical Daily Ingris Garcia MD     • metoprolol tartrate  25 mg Oral Q12H [address] Andrew Blackwood MD     • nystatin   Topical BID Andrew Blackwood MD     • ondansetron  4 mg Intravenous Q6H PRN Andrew Blackwood MD     • pantoprazole  20 mg Oral Early Morning Andrew Blackwood MD     • polyethylene glycol  17 g Oral Daily Andrew Blackwood MD     • potassium chloride  10 mEq Oral Daily Andrew Blackwood MD     • predniSONE  5 mg Oral BID With Meals Andrew Blackwood MD     • mouth daily. Ergocalciferol 50 MCG (2000 UT) Oral Tab  Take 1 tablet by mouth daily. folic acid 1 MG Oral Tab  Take 1 mg by mouth daily. Ferrous Sulfate 325 (65 Fe) MG Oral Tab  Take 325 mg by mouth 2 (two) times daily.     hydrOXYzine HCl 25 MG Or senna-docusate sodium  1 tablet Oral Daily Akira Holloway MD     • white petrolatum-mineral oil   Topical TID Akira Holloway MD          Today, Patient Was Seen By: Lorin Grigsby MD    **Please Note: This note may have been constructed using a voice recognition system  **

## 2022-12-18 NOTE — PLAN OF CARE
Problem: PHYSICAL THERAPY ADULT  Goal: Performs mobility at highest level of function for planned discharge setting  See evaluation for individualized goals  Description: Treatment/Interventions: Functional transfer training, LE strengthening/ROM, Therapeutic exercise, Endurance training, Patient/family training, Bed mobility, Equipment eval/education, Gait training          See flowsheet documentation for full assessment, interventions and recommendations  Outcome: Progressing  Note: Prognosis: Fair  Problem List: Decreased strength, Decreased endurance, Impaired balance, Decreased mobility, Decreased cognition, Decreased coordination, Impaired judgement, Decreased safety awareness, Pain  Assessment: Pt seen for PT session today  Treatment session included bed mobility, functional transfers, and seated balance to improve overall mobility and independence  Pt requires mod assist x1 for bed mobility, transfers and side stepping  Pt is making slow towards goals, as demonstrated by decreased activity tolerance and increased pain  Pt was able to tolerate 4 trials of side stepping to reach the head of bed  Patient demonstrates self limiting behaviors and also notes she is fearful of falling  Max encouragement required to continue to motivate patient to continue to participate in session  Pt was left supine at end of session, all needs within reach  Pt would benefit from continued skilled physical therapy to address remaining limitations  PT to continue to follow pt, recommending post acute rehabilitation  Barriers to Discharge: Inaccessible home environment, Decreased caregiver support     PT Discharge Recommendation: Post acute rehabilitation services    See flowsheet documentation for full assessment

## 2022-12-18 NOTE — PLAN OF CARE
Problem: OCCUPATIONAL THERAPY ADULT  Goal: Performs self-care activities at highest level of function for planned discharge setting  See evaluation for individualized goals  Description: Treatment Interventions: ADL retraining, Functional transfer training, Endurance training, Patient/family training, Compensatory technique education, Continued evaluation          See flowsheet documentation for full assessment, interventions and recommendations  Outcome: Progressing  Note: Limitation: Decreased ADL status, Decreased Safe judgement during ADL, Decreased endurance, Decreased self-care trans, Decreased high-level ADLs  Prognosis: Good  Assessment: Pt seen for Occupational Therapy session with focus on activity tolerance, bed mob, functional transfers/mob, sitting balance and tolerance and standing tolerance and balance for pt engagement in UB/LB self-care tasks  Pt cleared by RN/Megan for pt participated in OT session  Pt presented supine/HOB raised pt awake/alert and agreeable to participate in therapy following pt identifiers confirmed  Pt reported her therapy goal to "walk again"  She was pleasant and cooperative all therapy requests  Pt able to tolerate siting at edge of pt bed to participate in eating breakfast  She required assist for functional transfer and LB self-care 2* pt decreased overall strength and limited activity tolerance  Pt will require post acute rehab service to continue to address these above noted pt deficit which currently impair pt ADL and functional mob  Pt return to bed post session,    and all needs within reach       OT Discharge Recommendation: Post acute rehabilitation services

## 2022-12-18 NOTE — PLAN OF CARE
Problem: Potential for Falls  Goal: Patient will remain free of falls  Description: INTERVENTIONS:  - Educate patient/family on patient safety including physical limitations  - Instruct patient to call for assistance with activity   - Consult OT/PT to assist with strengthening/mobility   - Keep Call bell within reach  - Keep bed low and locked with side rails adjusted as appropriate  - Keep care items and personal belongings within reach  - Initiate and maintain comfort rounds  - Make Fall Risk Sign visible to staff  - Apply yellow socks and bracelet for high fall risk patients  - Consider moving patient to room near nurses station  Outcome: Progressing     Problem: PAIN - ADULT  Goal: Verbalizes/displays adequate comfort level or baseline comfort level  Description: Interventions:  - Encourage patient to monitor pain and request assistance  - Assess pain using appropriate pain scale  - Administer analgesics based on type and severity of pain and evaluate response  - Implement non-pharmacological measures as appropriate and evaluate response  - Consider cultural and social influences on pain and pain management  - Notify physician/advanced practitioner if interventions unsuccessful or patient reports new pain  Outcome: Progressing     Problem: INFECTION - ADULT  Goal: Absence or prevention of progression during hospitalization  Description: INTERVENTIONS:  - Assess and monitor for signs and symptoms of infection  - Monitor lab/diagnostic results  - Monitor all insertion sites, i e  indwelling lines, tubes, and drains  - Monitor endotracheal if appropriate and nasal secretions for changes in amount and color  - [place] appropriate cooling/warming therapies per order  - Administer medications as ordered  - Instruct and encourage patient and family to use good hand hygiene technique  - Identify and instruct in appropriate isolation precautions for identified infection/condition  Outcome: Progressing  Goal: Absence of fever/infection during neutropenic period  Description: INTERVENTIONS:  - Monitor WBC    Outcome: Progressing     Problem: SAFETY ADULT  Goal: Patient will remain free of falls  Description: INTERVENTIONS:  - Educate patient/family on patient safety including physical limitations  - Instruct patient to call for assistance with activity   - Consult OT/PT to assist with strengthening/mobility   - Keep Call bell within reach  - Keep bed low and locked with side rails adjusted as appropriate  - Keep care items and personal belongings within reach  - Initiate and maintain comfort rounds  - Make Fall Risk Sign visible to staff  - Apply yellow socks and bracelet for high fall risk patients  - Consider moving patient to room near nurses station  Outcome: Progressing  Goal: Maintain or return to baseline ADL function  Description: INTERVENTIONS:  -  Assess patient's ability to carry out ADLs; assess patient's baseline for ADL function and identify physical deficits which impact ability to perform ADLs (bathing, care of mouth/teeth, toileting, grooming, dressing, etc )  - Assess/evaluate cause of self-care deficits   - Assess range of motion  - Assess patient's mobility; develop plan if impaired  - Assess patient's need for assistive devices and provide as appropriate  - Encourage maximum independence but intervene and supervise when necessary  - Involve family in performance of ADLs  - Assess for home care needs following discharge   - Consider OT consult to assist with ADL evaluation and planning for discharge  - Provide patient education as appropriate  Outcome: Progressing  Goal: Maintains/Returns to pre admission functional level  Description: INTERVENTIONS:  - Perform BMAT or MOVE assessment daily    - Set and communicate daily mobility goal to care team and patient/family/caregiver     - Collaborate with rehabilitation services on mobility goals if consulted  - Record patient progress and toleration of activity level Outcome: Progressing     Problem: DISCHARGE PLANNING  Goal: Discharge to home or other facility with appropriate resources  Description: INTERVENTIONS:  - Identify barriers to discharge w/patient and caregiver  - Arrange for needed discharge resources and transportation as appropriate  - Identify discharge learning needs (meds, wound care, etc )  - Arrange for interpretive services to assist at discharge as needed  - Refer to Case Management Department for coordinating discharge planning if the patient needs post-hospital services based on physician/advanced practitioner order or complex needs related to functional status, cognitive ability, or social support system  Outcome: Progressing     Problem: Knowledge Deficit  Goal: Patient/family/caregiver demonstrates understanding of disease process, treatment plan, medications, and discharge instructions  Description: Complete learning assessment and assess knowledge base    Interventions:  - Provide teaching at level of understanding  - Provide teaching via preferred learning methods  Outcome: Progressing     Problem: MOBILITY - ADULT  Goal: Maintain or return to baseline ADL function  Description: INTERVENTIONS:  -  Assess patient's ability to carry out ADLs; assess patient's baseline for ADL function and identify physical deficits which impact ability to perform ADLs (bathing, care of mouth/teeth, toileting, grooming, dressing, etc )  - Assess/evaluate cause of self-care deficits   - Assess range of motion  - Assess patient's mobility; develop plan if impaired  - Assess patient's need for assistive devices and provide as appropriate  - Encourage maximum independence but intervene and supervise when necessary  - Involve family in performance of ADLs  - Assess for home care needs following discharge   - Consider OT consult to assist with ADL evaluation and planning for discharge  - Provide patient education as appropriate  Outcome: Progressing  Goal: Maintains/Returns to pre admission functional level  Description: INTERVENTIONS:  - Perform BMAT or MOVE assessment daily    - Set and communicate daily mobility goal to care team and patient/family/caregiver  - Collaborate with rehabilitation services on mobility goals if consulted  - Record patient progress and toleration of activity level   Outcome: Progressing     Problem: Prexisting or High Potential for Compromised Skin Integrity  Goal: Skin integrity is maintained or improved  Description: INTERVENTIONS:  - Identify patients at risk for skin breakdown  - Assess and monitor skin integrity  - Assess and monitor nutrition and hydration status  - Monitor labs   - Assess for incontinence   - Turn and reposition patient  - Assist with mobility/ambulation  - Relieve pressure over bony prominences  - Avoid friction and shearing  - Provide appropriate hygiene as needed including keeping skin clean and dry  - Evaluate need for skin moisturizer/barrier cream  - Collaborate with interdisciplinary team   - Patient/family teaching  - Consider wound care consult   Outcome: Progressing     Problem: Nutrition/Hydration-ADULT  Goal: Nutrient/Hydration intake appropriate for improving, restoring or maintaining nutritional needs  Description: Monitor and assess patient's nutrition/hydration status for malnutrition  Collaborate with interdisciplinary team and initiate plan and interventions as ordered  Monitor patient's weight and dietary intake as ordered or per policy  Utilize nutrition screening tool and intervene as necessary  Determine patient's food preferences and provide high-protein, high-caloric foods as appropriate       INTERVENTIONS:  - Monitor oral intake, urinary output, labs, and treatment plans  - Assess nutrition and hydration status and recommend course of action  - Evaluate amount of meals eaten  - Assist patient with eating if necessary   - Allow adequate time for meals  - Recommend/ encourage appropriate diets, oral nutritional supplements, and vitamin/mineral supplements  - Order, calculate, and assess calorie counts as needed  - Recommend, monitor, and adjust tube feedings and TPN/PPN based on assessed needs  - Assess need for intravenous fluids  - Provide specific nutrition/hydration education as appropriate  - Include patient/family/caregiver in decisions related to nutrition  Outcome: Progressing

## 2022-12-18 NOTE — PHYSICAL THERAPY NOTE
PHYSICAL THERAPY NOTE          Patient Name: Carlton GARDUNO'E Date: 12/18/2022 12/18/22 1000   PT Last Visit   PT Visit Date 12/18/22   Note Type   Note Type Treatment for insurance authorization   Pain Assessment   Pain Assessment Tool 0-10   Pain Score 7   Pain Location/Orientation Orientation: Bilateral;Location: Knee   Hospital Pain Intervention(s) Repositioned; Ambulation/increased activity; Emotional support   Cognition   Arousal/Participation Cooperative   Attention Attends with cues to redirect   Orientation Level Oriented X4   Following Commands Follows one step commands without difficulty   Bed Mobility   Sit to Supine 3  Moderate assistance   Additional items Assist x 1; Increased time required   Transfers   Sit to Stand 3  Moderate assistance   Additional items Assist x 1; Increased time required;Verbal cues   Stand to Sit 3  Moderate assistance   Additional items Assist x 1; Increased time required;Verbal cues   Additional Comments Performed with RW, unable to achieve full upright posture   Ambulation/Elevation   Gait pattern Decreased foot clearance; Improper Weight shift   Gait Assistance 3  Moderate assist   Additional items Assist x 1;Verbal cues; Tactile cues   Assistive Device Rolling walker   Ambulation/Elevation Additional Comments Pt required max ecouragment to side step to head of bed  Pt required 4 attempts to achieve 2ft distance  Unable to clear her foot from the ground, slides her foot  Pt is fearful of falling and admits she limits herself  Balance   Static Sitting Fair   Dynamic Sitting Fair -   Static Standing Poor   Dynamic Standing Poor -   Ambulatory Poor -   Endurance Deficit   Endurance Deficit Yes   Activity Tolerance   Activity Tolerance Patient limited by fatigue;Patient limited by pain   Nurse Made Aware RN cleared pt for PT treatement session     Assessment   Prognosis Fair   Problem List Decreased strength;Decreased endurance; Impaired balance;Decreased mobility; Decreased cognition;Decreased coordination; Impaired judgement;Decreased safety awareness;Pain   Assessment Pt seen for PT session today  Treatment session included bed mobility, functional transfers, and seated balance to improve overall mobility and independence  Pt requires mod assist x1 for bed mobility, transfers and side stepping  Pt is making slow towards goals, as demonstrated by decreased activity tolerance and increased pain  Pt was able to tolerate 4 trials of side stepping to reach the head of bed  Patient demonstrates self limiting behaviors and also notes she is fearful of falling  Max encouragement required to continue to motivate patient to continue to participate in session  Pt was left supine at end of session, all needs within reach  Pt would benefit from continued skilled physical therapy to address remaining limitations  PT to continue to follow pt, recommending post acute rehabilitation  Barriers to Discharge Inaccessible home environment;Decreased caregiver support   Goals   Patient Goals To walk again   Plan   Treatment/Interventions ADL retraining;Functional transfer training;LE strengthening/ROM; Therapeutic exercise; Endurance training;Bed mobility; Equipment eval/education;Patient/family training;Gait training; Compensatory technique education;Continued evaluation;Spoke to nursing;OT;Family   Progress Slow progress, decreased activity tolerance   PT Frequency 3-5x/wk   Recommendation   PT Discharge Recommendation Post acute rehabilitation services   Equipment Recommended [address] Basic Mobility Inpatient   Turning in Bed Without Bedrails 2   Lying on Back to Sitting on Edge of Flat Bed 2   Moving Bed to Chair 1   Standing Up From Chair 2   Walk in Room 1   Climb 3-5 Stairs 1   Basic Mobility Inpatient Raw Score 9   Highest Level Of Mobility   JH-HLM Goal 3: Sit at edge of bed   JH-HLM Achieved 3: Sit at edge of bed         Andressa Nair, PT

## 2022-12-19 VITALS
BODY MASS INDEX: 46.94 KG/M2 | SYSTOLIC BLOOD PRESSURE: 118 MMHG | DIASTOLIC BLOOD PRESSURE: 58 MMHG | TEMPERATURE: 98 F | HEART RATE: 78 BPM | OXYGEN SATURATION: 96 % | RESPIRATION RATE: 14 BRPM | WEIGHT: 264.99 LBS

## 2022-12-19 LAB
FLUAV RNA RESP QL NAA+PROBE: NEGATIVE
FLUBV RNA RESP QL NAA+PROBE: NEGATIVE
RSV RNA RESP QL NAA+PROBE: NEGATIVE
SARS-COV-2 RNA RESP QL NAA+PROBE: NEGATIVE

## 2022-12-19 RX ORDER — LIDOCAINE 50 MG/G
2 PATCH TOPICAL DAILY
Refills: 0
Start: 2022-12-20

## 2022-12-19 RX ADMIN — POTASSIUM CHLORIDE 10 MEQ: 750 TABLET, EXTENDED RELEASE ORAL at 08:25

## 2022-12-19 RX ADMIN — CLOTRIMAZOLE: 1 CREAM TOPICAL at 08:49

## 2022-12-19 RX ADMIN — NYSTATIN: 100000 POWDER TOPICAL at 08:49

## 2022-12-19 RX ADMIN — HYDROXYCHLOROQUINE SULFATE 200 MG: 200 TABLET ORAL at 08:48

## 2022-12-19 RX ADMIN — HYDROXYCHLOROQUINE SULFATE 200 MG: 200 TABLET ORAL at 17:08

## 2022-12-19 RX ADMIN — PREDNISONE 5 MG: 5 TABLET ORAL at 08:26

## 2022-12-19 RX ADMIN — GABAPENTIN 100 MG: 100 CAPSULE ORAL at 08:24

## 2022-12-19 RX ADMIN — SENNOSIDES AND DOCUSATE SODIUM 1 TABLET: 8.6; 5 TABLET ORAL at 08:25

## 2022-12-19 RX ADMIN — Medication: at 17:09

## 2022-12-19 RX ADMIN — CLOBETASOL PROPIONATE 1 APPLICATION: 0.5 CREAM TOPICAL at 08:49

## 2022-12-19 RX ADMIN — CLOTRIMAZOLE: 1 CREAM TOPICAL at 17:10

## 2022-12-19 RX ADMIN — Medication 1000 UNITS: at 08:25

## 2022-12-19 RX ADMIN — APIXABAN 5 MG: 5 TABLET, FILM COATED ORAL at 17:07

## 2022-12-19 RX ADMIN — NYSTATIN: 100000 POWDER TOPICAL at 17:09

## 2022-12-19 RX ADMIN — PREDNISONE 5 MG: 5 TABLET ORAL at 17:08

## 2022-12-19 RX ADMIN — CLOBETASOL PROPIONATE 1 APPLICATION: 0.5 CREAM TOPICAL at 17:09

## 2022-12-19 RX ADMIN — Medication: at 08:49

## 2022-12-19 RX ADMIN — HYDROCORTISONE: 25 OINTMENT TOPICAL at 17:09

## 2022-12-19 RX ADMIN — PANTOPRAZOLE SODIUM 20 MG: 20 TABLET, DELAYED RELEASE ORAL at 05:25

## 2022-12-19 RX ADMIN — APIXABAN 5 MG: 5 TABLET, FILM COATED ORAL at 08:24

## 2022-12-19 NOTE — CASE MANAGEMENT
Case Management Discharge Planning Note    Patient name Titus Monday  Location [facility] 336-94 MRN [id number]  : 1950 Date 2022       Current Admission Date: 2022  Current Admission Diagnosis:Ambulatory dysfunction   Patient Active Problem List    Diagnosis Date Noted   • Right shoulder pain 12/15/2022   • Abnormal urinalysis 2022   • Ambulatory dysfunction 2022   • Hyperuricemia 2022   • Acute diastolic heart failure ([address] ) 2022   • Acute bronchitis 2022   • Assistance needed with transportation 09/15/2022   • Abnormal CT of the chest 2022   • Gram-positive cocci bacteremia 2022   • Pustular psoriasis 2022   • Elevated troponin 2022   • COVID-19 2022   • Paroxysmal atrial fibrillation ([address] ) 01/10/2022   • Hyperparathyroidism ([facility] ) 2021   • Murmur, cardiac 2021   • BPPV (benign paroxysmal positional vertigo) 2018   • Seasonal allergic rhinitis due to pollen 2018   • Lower extremity cellulitis 10/27/2017   • Osteoporosis 2016   • SIRS (systemic inflammatory response syndrome) ([facility] [facility] ) 2016   • Rheumatoid arthritis ([facility] [facility] ) 2016   • GERD (gastroesophageal reflux disease) 2016   • Sarcoid 2016   • Morbid obesity ([facility] [facility] ) 2016   • Vitamin D deficiency 2015   • Benign essential hypertension 2014   • Lumbar radiculopathy 2014      LOS (days): 6  Geometric Mean LOS (GMLOS) (days): 2 50  Days to GMLOS:-3 1     OBJECTIVE:  Risk of Unplanned Readmission Score: 23 78         Current admission status: Inpatient   Preferred Pharmacy:   [address] [place] [address] #[id number] [facility], [address] [place] [address]  [address]  Phone: [phone] Fax: [phone] [address] [address]  Phone: [phone] Fax: [phone]    Primary Care Provider: Leonel Mora, DO    [facility]: [address] [facility]:     [address] Number: [id number]

## 2022-12-19 NOTE — CASE MANAGEMENT
[facility]ona 48 has received approved authorization from:  Mita Britt [id number] received for: SNF  Facility: [address] [place] [address] #: [id number] (Plan auth ID not yet generated)  Start of Care: 12/19  Next Review Date: 12/21  Care Coordinator: Nathaly BOUDREAUX#: [phone]  Submit next review to: [phone]   Care Manager notified: Indra Dowd

## 2022-12-19 NOTE — CASE MANAGEMENT
Case Management Discharge Planning Note    Patient name [facility]  Location [facility] 313/[facility] 451-48 MRN [id number]  : 1950 Date 2022       Current Admission Date: 2022  Current Admission Diagnosis:Ambulatory dysfunction   Patient Active Problem List    Diagnosis Date Noted   • Right shoulder pain 12/15/2022   • Abnormal urinalysis 2022   • Ambulatory dysfunction 2022   • Hyperuricemia 2022   • Acute diastolic heart failure ([address] ) 2022   • Acute bronchitis 2022   • Assistance needed with transportation 09/15/2022   • Abnormal CT of the chest 2022   • Gram-positive cocci bacteremia 2022   • Pustular psoriasis 2022   • Elevated troponin 2022   • COVID-19 2022   • Paroxysmal atrial fibrillation ([address] ) 01/10/2022   • Hyperparathyroidism ([facility] [address] ) 2021   • Murmur, cardiac 2021   • BPPV (benign paroxysmal positional vertigo) 2018   • Seasonal allergic rhinitis due to pollen 2018   • Lower extremity cellulitis 10/27/2017   • Osteoporosis 2016   • SIRS (systemic inflammatory response syndrome) ([facility] [address] ) 2016   • Rheumatoid arthritis ([facility] [facility] ) 2016   • GERD (gastroesophageal reflux disease) 2016   • Sarcoid 2016   • Morbid obesity ([facility] [address] [facility] ) 2016   • Vitamin D deficiency 2015   • Benign essential hypertension 2014   • Lumbar radiculopathy 2014      LOS (days): 6  Geometric Mean LOS (GMLOS) (days): 2 50  Days to GMLOS:-3     OBJECTIVE:  Risk of Unplanned Readmission Score: 23 78         Current admission status: Inpatient   Preferred Pharmacy:   [facility] #[id number] [address] [address]  Phone: [phone] Fax: [phone] [address] [address]  Phone: [phone] Fax: [phone]    Primary Care Provider: Mauro Arguello, DO    Primary Insurance: [facility] REP  Secondary Insurance:     DISCHARGE DETAILS:    Discharge planning discussed with[de-identified] Elise Hernadez of Choice: Yes  Comments - Freedom of Choice: Family still agreeable to Saw but states that if [place] is declined for STR they would like [place] to be attempted  CM contacted family/caregiver?: Yes  Were Treatment Team discharge recommendations reviewed with patient/caregiver?: Yes  Did patient/caregiver verbalize understanding of patient care needs?: Yes       Contacts  Patient Contacts: Megan Rubio, daughter  Relationship to Patient[de-identified] Family  Contact Method: Phone  Phone Number: [phone]  Reason/Outcome: Continuity of Care, Emergency Contact, Referral, Discharge Planning     Other Referral/Resources/Interventions Provided:  Interventions: Short Term Rehab  Referral Comments: CM reviewded with Cedric Lopez that CM is currently waiting for authorization for sTR but confirmed that the [place] has been submitted       Treatment Team Recommendation: Short Term Rehab  Discharge Destination Plan[de-identified] Short Term Rehab      IMM Given (Date):: 12/19/22  IMM Given to[de-identified] Family  Family notified[de-identified] CM reviewed IMM with daughter Cedric Lopez at [phone]  Additional Comments: CM reached out to [facility] provider and confirmed that pt continues to be medically stable for d/c to STR  CM confirmed that an authorization to insurance has been submitted, CM waiting to hear back from insurance  CM reached out to daughter, Cedric Lopez and updated her that CM is still waiting for auth  CM reviewed IMM with Cedric Neighbors verbally via phone

## 2022-12-19 NOTE — CASE MANAGEMENT
[facility] 48 received request for authorization from Care Manager  Authorization request for: SNF  Facility Name: [facility]  NPI: [id number]  Facility MD:  Dr Vickie Maier:  [id number]  Authorization initiated by contacting: [facility]  Via:  Phone  Pending Reference #: [id number]  Clinicals submitted via:  Fax

## 2022-12-19 NOTE — CASE MANAGEMENT
Case Management Discharge Planning Note    Patient name Ninoska Perez  Location [facility] 313/[facility] 386-02 MRN [id number]  : 1950 Date 2022       Current Admission Date: 2022  Current Admission Diagnosis:Ambulatory dysfunction   Patient Active Problem List    Diagnosis Date Noted   • Right shoulder pain 12/15/2022   • Abnormal urinalysis 2022   • Ambulatory dysfunction 2022   • Hyperuricemia 2022   • Acute diastolic heart failure ([address] ) 2022   • Acute bronchitis 2022   • Assistance needed with transportation 09/15/2022   • Abnormal CT of the chest 2022   • Gram-positive cocci bacteremia 2022   • Pustular psoriasis 2022   • Elevated troponin 2022   • COVID-19 2022   • Paroxysmal atrial fibrillation ([address] ) 01/10/2022   • Hyperparathyroidism ([facility] ) 2021   • Murmur, cardiac 2021   • BPPV (benign paroxysmal positional vertigo) 2018   • Seasonal allergic rhinitis due to pollen 2018   • Lower extremity cellulitis 10/27/2017   • Osteoporosis 2016   • SIRS (systemic inflammatory response syndrome) ([facility] [facility] ) 2016   • Rheumatoid arthritis ([facility] ) 2016   • GERD (gastroesophageal reflux disease) 2016   • Sarcoid 2016   • Morbid obesity ([facility] ) 2016   • Vitamin D deficiency 2015   • Benign essential hypertension 2014   • Lumbar radiculopathy 2014      LOS (days): 6  Geometric Mean LOS (GMLOS) (days): 2 50  Days to GMLOS:-3 2     OBJECTIVE:  Risk of Unplanned Readmission Score: 23 78         Current admission status: Inpatient   Preferred Pharmacy:   [address] [place], [place] [id number] [address] [address]  Phone: [phone] Fax: [phone] [address] [address]  Phone: [phone] Fax: [phone]    Primary Care Provider: Pati Litten, DO    Primary Insurance: [facility] REP  Secondary Insurance:     DISCHARGE DETAILS:    Discharge planning discussed with[de-identified] Sarita Robertson of Choice: Yes      Additional Comments: CM received auth for rehab  Auth information was provided to [address] [place] [address],[address] via [facility]  CM placed a BLS ambulance request via [employer] and was provided a  time of 2030 via [facility]  Pt, daughter, RN, XENIA, and Saw were made aware of trasnsport time  RN was provided with phone number and fax for report  CM provided CMN to floor RN  Pt was provided with a copy of IMM

## 2022-12-19 NOTE — DISCHARGE SUMMARY
[address] [facility]  Discharge- Carlton Montalvo 1950, 74 y o  female MRN: [id number]  Unit/Bed#: [facility] 313-01 Encounter: [id number]  Primary Care Provider: Neo Moses DO   Date and time admitted to hospital: 12/13/2022  9:05 PM    * Ambulatory dysfunction  Assessment & Plan  Ambulatory dysfunction   Multifactorial  Likely deconditioning contributing  Safe ambulation  Fall precautions  Physical therapy      Right shoulder pain  Assessment & Plan  · X-ray without acute abnormality  · Analgesics, supportive care    Abnormal urinalysis  Assessment & Plan  · Monitor    Lumbar radiculopathy  Assessment & Plan  Gabapentin 100 mg a m , 300 mg at night      Morbid obesity ([address] )  Assessment & Plan  Therapeutic lifestyle modification encouraged      Benign essential hypertension  Assessment & Plan  · Monitor blood pressures  · Avoid hypotension    Sarcoid  Assessment & Plan  · Noted history   · Follows with outpatient rheum     GERD (gastroesophageal reflux disease)  Assessment & Plan  · On Protonix 20 mg daily  · Maalox to continue  Rheumatoid arthritis (HCC)  Assessment & Plan  · On plaquenil 200 mg BID as well as prednisone 5 mg BID   · Supportive care               Medical Problems     Resolved Problems  Date Reviewed: 12/18/2022   None         Admission Date:   Admission Orders (From admission, onward)     Ordered        12/13/22 2318  Inpatient Admission  Once                        Admitting Diagnosis: Morbid obesity ([address] ) [E66 01]  Weakness [R53 1]  Ambulatory dysfunction [R26 2]    HPI: This is a very pleasant 20-year-old female with past medical history morbid obesity, hypertension gastroesophageal reflux disease lumbar radiculopathy paroxysmal A  fib rheumatoid arthritis on chronic prednisone vitamin D deficiency who presents to the hospital with decreased ability to walk    Patient was recently admitted 4 weeks ago due to acute congestive heart failure with bilateral pedal edema and cellulitis fortunately, the patient was presented back with increasing weakness after being discharged from rehab  Patient with inability to ambulate    Procedures Performed: No orders of the defined types were placed in this encounter  Summary of Hospital Course: Patient presents with weakness and ambulatory dysfunction as mentioned above  Patient had a UA on admission which showed possible urinary tract infection versus colonization  This was not initially treated due to lack of symptoms  However family reported concern over lethargy and possible urinary tract infection  She was empirically placed on antibiotic therapy  Will discontinue at discharge given likely colonization   Patient is on day 4 of Rocephin which is resistant on culture profile for urine  Given stable vital signs with no fever or symptoms in addition to no white count elevation---suspect colonization as above  Patient will require discharge to rehab        Discharge instructions/Information to patient and family:   See after visit summary for information provided to patient and family  Provisions for Follow-Up Care:  See after visit summary for information related to follow-up care and any pertinent home health orders  PCP: Bettie Nair DO    Disposition: Home    Planned Readmission: No    Discharge Statement   I spent 35 minutes discharging the patient  This time was spent on the day of discharge  I had direct contact with the patient on the day of discharge  Additional documentation is required if more than 30 minutes were spent on discharge  Discharge Medications:  See after visit summary for reconciled discharge medications provided to patient and family

## 2022-12-19 NOTE — PLAN OF CARE
Problem: Potential for Falls  Goal: Patient will remain free of falls  Description: INTERVENTIONS:  - Educate patient/family on patient safety including physical limitations  - Instruct patient to call for assistance with activity   - Consult OT/PT to assist with strengthening/mobility   - Keep Call bell within reach  - Keep bed low and locked with side rails adjusted as appropriate  - Keep care items and personal belongings within reach  - Initiate and maintain comfort rounds  - Make Fall Risk Sign visible to staff  - Offer Toileting every *** Hours, in advance of need  - Initiate/Maintain ***alarm  - Obtain necessary fall risk management equipment: ***  - Apply yellow socks and bracelet for high fall risk patients  - Consider moving patient to room near nurses station  Outcome: Progressing     Problem: PAIN - ADULT  Goal: Verbalizes/displays adequate comfort level or baseline comfort level  Description: Interventions:  - Encourage patient to monitor pain and request assistance  - Assess pain using appropriate pain scale  - Administer analgesics based on type and severity of pain and evaluate response  - Implement non-pharmacological measures as appropriate and evaluate response  - Consider cultural and social influences on pain and pain management  - Notify physician/advanced practitioner if interventions unsuccessful or patient reports new pain  Outcome: Progressing     Problem: INFECTION - ADULT  Goal: Absence or prevention of progression during hospitalization  Description: INTERVENTIONS:  - Assess and monitor for signs and symptoms of infection  - Monitor lab/diagnostic results  - Monitor all insertion sites, i e  indwelling lines, tubes, and drains  - Monitor endotracheal if appropriate and nasal secretions for changes in amount and color  - [place] appropriate cooling/warming therapies per order  - Administer medications as ordered  - Instruct and encourage patient and family to use good hand hygiene technique  - Identify and instruct in appropriate isolation precautions for identified infection/condition  Outcome: Progressing  Goal: Absence of fever/infection during neutropenic period  Description: INTERVENTIONS:  - Monitor WBC    Outcome: Progressing     Problem: SAFETY ADULT  Goal: Patient will remain free of falls  Description: INTERVENTIONS:  - Educate patient/family on patient safety including physical limitations  - Instruct patient to call for assistance with activity   - Consult OT/PT to assist with strengthening/mobility   - Keep Call bell within reach  - Keep bed low and locked with side rails adjusted as appropriate  - Keep care items and personal belongings within reach  - Initiate and maintain comfort rounds  - Make Fall Risk Sign visible to staff  - Offer Toileting every *** Hours, in advance of need  - Initiate/Maintain ***alarm  - Obtain necessary fall risk management equipment: ***  - Apply yellow socks and bracelet for high fall risk patients  - Consider moving patient to room near nurses station  Outcome: Progressing  Goal: Maintain or return to baseline ADL function  Description: INTERVENTIONS:  -  Assess patient's ability to carry out ADLs; assess patient's baseline for ADL function and identify physical deficits which impact ability to perform ADLs (bathing, care of mouth/teeth, toileting, grooming, dressing, etc )  - Assess/evaluate cause of self-care deficits   - Assess range of motion  - Assess patient's mobility; develop plan if impaired  - Assess patient's need for assistive devices and provide as appropriate  - Encourage maximum independence but intervene and supervise when necessary  - Involve family in performance of ADLs  - Assess for home care needs following discharge   - Consider OT consult to assist with ADL evaluation and planning for discharge  - Provide patient education as appropriate  Outcome: Progressing  Goal: Maintains/Returns to pre admission functional level  Description: INTERVENTIONS:  - Perform BMAT or MOVE assessment daily    - Set and communicate daily mobility goal to care team and patient/family/caregiver  - Collaborate with rehabilitation services on mobility goals if consulted  - Perform Range of Motion *** times a day  - Reposition patient every *** hours  - Dangle patient *** times a day  - Stand patient *** times a day  - Ambulate patient *** times a day  - Out of bed to chair *** times a day   - Out of bed for meals *** times a day  - Out of bed for toileting  - Record patient progress and toleration of activity level   Outcome: Progressing     Problem: DISCHARGE PLANNING  Goal: Discharge to home or other facility with appropriate resources  Description: INTERVENTIONS:  - Identify barriers to discharge w/patient and caregiver  - Arrange for needed discharge resources and transportation as appropriate  - Identify discharge learning needs (meds, wound care, etc )  - Arrange for interpretive services to assist at discharge as needed  - Refer to Case Management Department for coordinating discharge planning if the patient needs post-hospital services based on physician/advanced practitioner order or complex needs related to functional status, cognitive ability, or social support system  Outcome: Progressing     Problem: Knowledge Deficit  Goal: Patient/family/caregiver demonstrates understanding of disease process, treatment plan, medications, and discharge instructions  Description: Complete learning assessment and assess knowledge base    Interventions:  - Provide teaching at level of understanding  - Provide teaching via preferred learning methods  Outcome: Progressing     Problem: MOBILITY - ADULT  Goal: Maintain or return to baseline ADL function  Description: INTERVENTIONS:  -  Assess patient's ability to carry out ADLs; assess patient's baseline for ADL function and identify physical deficits which impact ability to perform ADLs (bathing, care of mouth/teeth, toileting, grooming, dressing, etc )  - Assess/evaluate cause of self-care deficits   - Assess range of motion  - Assess patient's mobility; develop plan if impaired  - Assess patient's need for assistive devices and provide as appropriate  - Encourage maximum independence but intervene and supervise when necessary  - Involve family in performance of ADLs  - Assess for home care needs following discharge   - Consider OT consult to assist with ADL evaluation and planning for discharge  - Provide patient education as appropriate  Outcome: Progressing  Goal: Maintains/Returns to pre admission functional level  Description: INTERVENTIONS:  - Perform BMAT or MOVE assessment daily    - Set and communicate daily mobility goal to care team and patient/family/caregiver  - Collaborate with rehabilitation services on mobility goals if consulted  - Perform Range of Motion *** times a day  - Reposition patient every *** hours    - Dangle patient *** times a day  - Stand patient *** times a day  - Ambulate patient *** times a day  - Out of bed to chair *** times a day   - Out of bed for meals *** times a day  - Out of bed for toileting  - Record patient progress and toleration of activity level   Outcome: Progressing     Problem: Prexisting or High Potential for Compromised Skin Integrity  Goal: Skin integrity is maintained or improved  Description: INTERVENTIONS:  - Identify patients at risk for skin breakdown  - Assess and monitor skin integrity  - Assess and monitor nutrition and hydration status  - Monitor labs   - Assess for incontinence   - Turn and reposition patient  - Assist with mobility/ambulation  - Relieve pressure over bony prominences  - Avoid friction and shearing  - Provide appropriate hygiene as needed including keeping skin clean and dry  - Evaluate need for skin moisturizer/barrier cream  - Collaborate with interdisciplinary team   - Patient/family teaching  - Consider wound care consult Outcome: Progressing     Problem: Nutrition/Hydration-ADULT  Goal: Nutrient/Hydration intake appropriate for improving, restoring or maintaining nutritional needs  Description: Monitor and assess patient's nutrition/hydration status for malnutrition  Collaborate with interdisciplinary team and initiate plan and interventions as ordered  Monitor patient's weight and dietary intake as ordered or per policy  Utilize nutrition screening tool and intervene as necessary  Determine patient's food preferences and provide high-protein, high-caloric foods as appropriate       INTERVENTIONS:  - Monitor oral intake, urinary output, labs, and treatment plans  - Assess nutrition and hydration status and recommend course of action  - Evaluate amount of meals eaten  - Assist patient with eating if necessary   - Allow adequate time for meals  - Recommend/ encourage appropriate diets, oral nutritional supplements, and vitamin/mineral supplements  - Order, calculate, and assess calorie counts as needed  - Recommend, monitor, and adjust tube feedings and TPN/PPN based on assessed needs  - Assess need for intravenous fluids  - Provide specific nutrition/hydration education as appropriate  - Include patient/family/caregiver in decisions related to nutrition  Outcome: Progressing

## 2022-12-19 NOTE — PLAN OF CARE
Problem: Potential for Falls  Goal: Patient will remain free of falls  Description: INTERVENTIONS:  - Educate patient/family on patient safety including physical limitations  - Instruct patient to call for assistance with activity   - Consult OT/PT to assist with strengthening/mobility   - Keep Call bell within reach  - Keep bed low and locked with side rails adjusted as appropriate  - Keep care items and personal belongings within reach  - Initiate and maintain comfort rounds  - Make Fall Risk Sign visible to staff  - Offer Toileting every 2 Hours, in advance of need  - Initiate/Maintain bed alarm  - Obtain necessary fall risk management equipment  - Apply yellow socks and bracelet for high fall risk patients  - Consider moving patient to room near nurses station  Outcome: Progressing     Problem: PAIN - ADULT  Goal: Verbalizes/displays adequate comfort level or baseline comfort level  Description: Interventions:  - Encourage patient to monitor pain and request assistance  - Assess pain using appropriate pain scale  - Administer analgesics based on type and severity of pain and evaluate response  - Implement non-pharmacological measures as appropriate and evaluate response  - Consider cultural and social influences on pain and pain management  - Notify physician/advanced practitioner if interventions unsuccessful or patient reports new pain  Outcome: Progressing     Problem: INFECTION - ADULT  Goal: Absence or prevention of progression during hospitalization  Description: INTERVENTIONS:  - Assess and monitor for signs and symptoms of infection  - Monitor lab/diagnostic results  - Monitor all insertion sites, i e  indwelling lines, tubes, and drains  - Monitor endotracheal if appropriate and nasal secretions for changes in amount and color  - [place] appropriate cooling/warming therapies per order  - Administer medications as ordered  - Instruct and encourage patient and family to use good hand hygiene technique  - Identify and instruct in appropriate isolation precautions for identified infection/condition  Outcome: Progressing  Goal: Absence of fever/infection during neutropenic period  Description: INTERVENTIONS:  - Monitor WBC    Outcome: Progressing     Problem: SAFETY ADULT  Goal: Patient will remain free of falls  Description: INTERVENTIONS:  - Educate patient/family on patient safety including physical limitations  - Instruct patient to call for assistance with activity   - Consult OT/PT to assist with strengthening/mobility   - Keep Call bell within reach  - Keep bed low and locked with side rails adjusted as appropriate  - Keep care items and personal belongings within reach  - Initiate and maintain comfort rounds  - Make Fall Risk Sign visible to staff  - Offer Toileting every 2 Hours, in advance of need  - Initiate/Maintain bed alarm  - Obtain necessary fall risk management equipment  - Apply yellow socks and bracelet for high fall risk patients  - Consider moving patient to room near nurses station  Outcome: Progressing  Goal: Maintain or return to baseline ADL function  Description: INTERVENTIONS:  -  Assess patient's ability to carry out ADLs; assess patient's baseline for ADL function and identify physical deficits which impact ability to perform ADLs (bathing, care of mouth/teeth, toileting, grooming, dressing, etc )  - Assess/evaluate cause of self-care deficits   - Assess range of motion  - Assess patient's mobility; develop plan if impaired  - Assess patient's need for assistive devices and provide as appropriate  - Encourage maximum independence but intervene and supervise when necessary  - Involve family in performance of ADLs  - Assess for home care needs following discharge   - Consider OT consult to assist with ADL evaluation and planning for discharge  - Provide patient education as appropriate  Outcome: Progressing  Goal: Maintains/Returns to pre admission functional level  Description: INTERVENTIONS:  - Perform BMAT or MOVE assessment daily    - Set and communicate daily mobility goal to care team and patient/family/caregiver  - Collaborate with rehabilitation services on mobility goals if consulted  - Perform Range of Motion 4 times a day  - Reposition patient every 2 hours  - Dangle patient 4 times a day  - Stand patient 3 times a day  - Ambulate patient 3 times a day  - Out of bed to chair 3 times a day   - Out of bed for meals 3 times a day  - Out of bed for toileting  - Record patient progress and toleration of activity level   Outcome: Progressing     Problem: DISCHARGE PLANNING  Goal: Discharge to home or other facility with appropriate resources  Description: INTERVENTIONS:  - Identify barriers to discharge w/patient and caregiver  - Arrange for needed discharge resources and transportation as appropriate  - Identify discharge learning needs (meds, wound care, etc )  - Arrange for interpretive services to assist at discharge as needed  - Refer to Case Management Department for coordinating discharge planning if the patient needs post-hospital services based on physician/advanced practitioner order or complex needs related to functional status, cognitive ability, or social support system  Outcome: Progressing     Problem: Knowledge Deficit  Goal: Patient/family/caregiver demonstrates understanding of disease process, treatment plan, medications, and discharge instructions  Description: Complete learning assessment and assess knowledge base    Interventions:  - Provide teaching at level of understanding  - Provide teaching via preferred learning methods  Outcome: Progressing     Problem: MOBILITY - ADULT  Goal: Maintain or return to baseline ADL function  Description: INTERVENTIONS:  -  Assess patient's ability to carry out ADLs; assess patient's baseline for ADL function and identify physical deficits which impact ability to perform ADLs (bathing, care of mouth/teeth, toileting, grooming, dressing, etc )  - Assess/evaluate cause of self-care deficits   - Assess range of motion  - Assess patient's mobility; develop plan if impaired  - Assess patient's need for assistive devices and provide as appropriate  - Encourage maximum independence but intervene and supervise when necessary  - Involve family in performance of ADLs  - Assess for home care needs following discharge   - Consider OT consult to assist with ADL evaluation and planning for discharge  - Provide patient education as appropriate  Outcome: Progressing  Goal: Maintains/Returns to pre admission functional level  Description: INTERVENTIONS:  - Perform BMAT or MOVE assessment daily    - Set and communicate daily mobility goal to care team and patient/family/caregiver  - Collaborate with rehabilitation services on mobility goals if consulted  - Perform Range of Motion 4 times a day  - Reposition patient every 2 hours    - Dangle patient 4 times a day  - Stand patient 3 times a day  - Ambulate patient 3 times a day  - Out of bed to chair 3 times a day   - Out of bed for meals 3 times a day  - Out of bed for toileting  - Record patient progress and toleration of activity level   Outcome: Progressing     Problem: Prexisting or High Potential for Compromised Skin Integrity  Goal: Skin integrity is maintained or improved  Description: INTERVENTIONS:  - Identify patients at risk for skin breakdown  - Assess and monitor skin integrity  - Assess and monitor nutrition and hydration status  - Monitor labs   - Assess for incontinence   - Turn and reposition patient  - Assist with mobility/ambulation  - Relieve pressure over bony prominences  - Avoid friction and shearing  - Provide appropriate hygiene as needed including keeping skin clean and dry  - Evaluate need for skin moisturizer/barrier cream  - Collaborate with interdisciplinary team   - Patient/family teaching  - Consider wound care consult   Outcome: Progressing     Problem: Nutrition/Hydration-ADULT  Goal: Nutrient/Hydration intake appropriate for improving, restoring or maintaining nutritional needs  Description: Monitor and assess patient's nutrition/hydration status for malnutrition  Collaborate with interdisciplinary team and initiate plan and interventions as ordered  Monitor patient's weight and dietary intake as ordered or per policy  Utilize nutrition screening tool and intervene as necessary  Determine patient's food preferences and provide high-protein, high-caloric foods as appropriate       INTERVENTIONS:  - Monitor oral intake, urinary output, labs, and treatment plans  - Assess nutrition and hydration status and recommend course of action  - Evaluate amount of meals eaten  - Assist patient with eating if necessary   - Allow adequate time for meals  - Recommend/ encourage appropriate diets, oral nutritional supplements, and vitamin/mineral supplements  - Order, calculate, and assess calorie counts as needed  - Recommend, monitor, and adjust tube feedings and TPN/PPN based on assessed needs  - Assess need for intravenous fluids  - Provide specific nutrition/hydration education as appropriate  - Include patient/family/caregiver in decisions related to nutrition  Outcome: Progressing

## 2022-12-22 ENCOUNTER — TRANSITIONAL CARE MANAGEMENT (OUTPATIENT)
Dept: INTERNAL MEDICINE CLINIC | Facility: CLINIC | Age: 72
End: 2022-12-22

## 2023-01-17 ENCOUNTER — NURSING HOME VISIT (OUTPATIENT)
Dept: WOUND CARE | Facility: HOSPITAL | Age: 73
End: 2023-01-17

## 2023-01-17 DIAGNOSIS — R26.2 AMBULATORY DYSFUNCTION: ICD-10-CM

## 2023-01-17 DIAGNOSIS — L25.8 DERMATITIS ASSOCIATED WITH INCONTINENCE: Primary | ICD-10-CM

## 2023-01-17 DIAGNOSIS — R32 DERMATITIS ASSOCIATED WITH INCONTINENCE: Primary | ICD-10-CM

## 2023-01-18 PROBLEM — L25.8 DERMATITIS ASSOCIATED WITH INCONTINENCE: Status: ACTIVE | Noted: 2023-01-18

## 2023-01-18 PROBLEM — R32 DERMATITIS ASSOCIATED WITH INCONTINENCE: Status: ACTIVE | Noted: 2023-01-18

## 2023-01-18 NOTE — PROGRESS NOTES
[address] [facility]NAG[facility]   [facility]       Patient ID: Max Howell is a 74 y o  female Date of Birth 1950     Location of Service: [place] [address]    Performed wound round with: Wound team     Chief Complaint : Right posterior thigh    Wound Instructions:  Wound: Right posterior thigh  Discontinue previous wound order  Cleanse the wound bed with soap and water, pat dry  Apply Triad paste to wound bed  Frequency : twice a day and prn for soiling  Offload all wounds  Turn and reposition frequently,   Increase protein intake  Monitor for any sign of infection or worsening, inform PCP or patient's primary physician in your facility  Allergies  Methotrexate derivatives, Amoxicillin, Ampicillin-sulbactam sodium, and Shellfish-derived products - food allergy      Assessment & Plan:  1  Dermatitis associated with incontinence  Assessment & Plan:  Right posterior thigh  · Partial-thickness, with no obvious sign of infection  · I ordered Triad paste for local wound care  · Continue to offload, on air mattress  · Follow-up next week      2  Ambulatory dysfunction  Assessment & Plan: On short-term rehab             Subjective:   January 17, 2023  This is a new consult for wound on the right posterior thigh  Patient was referred by the geriatric team   Patient have a complex medical history including but not limited to hypertension, arthritis, and GERD  Patient currently admitted at [place] [address] for short-term rehab  Wound history: As per report, patient was admitted with wound on the right posterior thigh  Possible etiology is dermatitis  Received patient in bed, seems comfortable  Denies pain  Needs moderate assistance with turning and repositioning when in bed  Patient have a good appetite  Patient is incontinent of bowel and bladder  Currently on pressure ulcer prevention protocol in the facility  Review of Systems   Constitutional: Negative  HENT: Negative  Eyes: Negative  Respiratory: Negative  Cardiovascular: Negative for chest pain and leg swelling  Gastrointestinal: Negative  Endocrine: Negative  Genitourinary: Negative  Musculoskeletal: Positive for gait problem  Skin: Positive for wound  See HPI   Neurological: Negative for dizziness and headaches  Psychiatric/Behavioral: Negative for behavioral problems  Objective:    Physical Exam  Constitutional:       Appearance: Normal appearance  HENT:      Head: Normocephalic and atraumatic  Nose: Nose normal       Mouth/Throat:      Pharynx: Oropharynx is clear  Eyes:      Conjunctiva/sclera: Conjunctivae normal    Cardiovascular:      Rate and Rhythm: Normal rate  Pulmonary:      Effort: Pulmonary effort is normal    Abdominal:      Tenderness: There is no abdominal tenderness  There is no guarding  Genitourinary:     Comments: incontinent  Musculoskeletal:         General: No tenderness  Cervical back: Normal range of motion  Right lower leg: No edema  Left lower leg: No edema  Comments: LROM   Skin:     Findings: Lesion present  Comments: Right posterior thigh: Wound size is 0 2 x 0 2 x 0 1 cm ,  100% epithelial, no drainage, periwound is normal, denies pain, no obvious sign of infection   Neurological:      Mental Status: She is alert  Gait: Gait abnormal    Psychiatric:         Mood and Affect: Mood normal          Behavior: Behavior normal               Procedures           Patient's care was coordinated with nursing facility staff  Recent vitals, labs and updated medications were reviewed on EMR or chart system of facility  Past Medical, surgical, social, medication and allergy history and patient's previous records were reviewed and updated as appropriate: Most up-to date information is available in the facility EMR where the patient is currently admitted      Patient Active Problem List   Diagnosis   • SIRS (systemic inflammatory response syndrome) ([facility])   • Rheumatoid arthritis ([facility] )   • GERD (gastroesophageal reflux disease)   • Sarcoid   • Benign essential hypertension   • Morbid obesity ([facility])   • Lumbar radiculopathy   • Osteoporosis   • Vitamin D deficiency   • Lower extremity cellulitis   • BPPV (benign paroxysmal positional vertigo)   • Seasonal allergic rhinitis due to pollen   • Hyperparathyroidism ([facility] )   • Murmur, cardiac   • Paroxysmal atrial fibrillation ([facility])   • COVID-19   • Elevated troponin   • Gram-positive cocci bacteremia   • Pustular psoriasis   • Abnormal CT of the chest   • Assistance needed with transportation   • Acute bronchitis   • Acute diastolic heart failure ([facility])   • Hyperuricemia   • Ambulatory dysfunction   • Abnormal urinalysis   • Right shoulder pain   • Dermatitis associated with incontinence     Past Medical History:   Diagnosis Date   • Abnormal thyroid function test     last assessed: 2015    • Arthritis    • Caries     last assessed: 2016    • Continuous opioid dependence ([place] [address] ) 2021   • Edema of right lower extremity     last assessed: 2015    • GERD (gastroesophageal reflux disease)    • Hypertension    • Medicare annual wellness visit, subsequent 2021   • Positive blood culture 3/11/2022   • Sarcoid      Past Surgical History:   Procedure Laterality Date   •  SECTION     • MULTIPLE TOOTH EXTRACTIONS N/A 2016    Procedure: Surgical extraction of teeth 2, 18, 19, 30, 31; incision and drainage of left subperiosteal abscess ;  Surgeon: Jason Hidalgo DMD;  [facility]: [facility];  Service:      Social History     Socioeconomic History   • Marital status: Single     Spouse name: None   • Number of children: None   • Years of education: None   • Highest education level: None   Occupational History   • Occupation: Retired: Worked for [employer] company    Tobacco Use   • Smoking status: Never   • Smokeless tobacco: Never   Vaping Use   • Vaping Use: Never used   Substance and Sexual Activity   • Alcohol use: Not Currently   • Drug use: No   • Sexual activity: Not Currently   Other Topics Concern   • None   Social History Narrative     noted in "allscripts"      Social Determinants of Health     Financial Resource Strain: Not on file   Food Insecurity: No Food Insecurity   • Worried About Running Out of Food in the Last Year: Never true   • Ran Out of Food in the Last Year: Never true   Transportation Needs: No Transportation Needs   • Lack of Transportation (Medical): No   • Lack of Transportation (Non-Medical): No   Physical Activity: Not on file   Stress: Not on file   Social Connections: Not on file   Intimate Partner Violence: Not on file   Housing Stability: Low Risk    • Unable to Pay for Housing in the Last Year: No   • Number of Places Lived in the Last Year: 1   • Unstable Housing in the Last Year: No        Current Outpatient Medications:   •  acetaminophen (TYLENOL) 325 mg tablet, Take 2 tablets (650 mg total) by mouth every 4 (four) hours as needed for mild pain, headaches or fever  , Disp: 30 tablet, Rfl: 0  •  albuterol (Ventolin HFA) 90 mcg/act inhaler, Inhale 2 puffs every 6 (six) hours as needed for wheezing, Disp: 18 g, Rfl: 0  •  alendronate (FOSAMAX) 70 mg tablet, Take by mouth every 7 days , Disp: , Rfl:   •  apixaban (ELIQUIS) 5 mg, Take 1 tablet (5 mg total) by mouth 2 (two) times a day, Disp: 60 tablet, Rfl: 0  •  cholecalciferol (VITAMIN D3) 1,000 units tablet, Take 1 tablet (1,000 Units total) by mouth daily, Disp: 90 tablet, Rfl: 0  •  Clobetasol Propionate E 0 05 % emollient cream, APPLY TWICE A DAY FOR PALM SKIN DERMITITS, Disp: , Rfl:   •  furosemide (LASIX) 40 mg tablet, Take 1 tablet (40 mg total) by mouth daily, Disp: 30 tablet, Rfl: 0  •  gabapentin (Neurontin) 100 mg capsule, Take 1 capsule in AM and 3 capsules in PM, Disp: 120 capsule, Rfl: 1  •  guaifenesin-codeine (GUAIFENESIN AC) 100-10 MG/5ML liquid, Take 5 mL by mouth 3 (three) times a day as needed for cough, Disp: 180 mL, Rfl: 0  •  hydrocortisone 2 5 % ointment, Apply topically 4 (four) times a day as needed for irritation, Disp: 30 g, Rfl: 0  •  hydroxychloroquine (PLAQUENIL) 200 mg tablet, Take 1 tablet (200 mg total) by mouth 2 (two) times a day, Disp: 60 tablet, Rfl: 0  •  ketoconazole (NIZORAL) 2 % cream, Apply topically 2 (two) times a day, Disp: 60 g, Rfl: 0  •  lidocaine (LIDODERM) 5 %, Apply 2 patches topically daily Remove & Discard patch within 12 hours or as directed by MD Do not start before December 20, 2022 , Disp: , Rfl: 0  •  metoprolol tartrate (LOPRESSOR) 25 mg tablet, Take 1 tablet (25 mg total) by mouth every 12 (twelve) hours, Disp: 180 tablet, Rfl: 0  •  nystatin (MYCOSTATIN) powder, Apply topically 2 (two) times a day, Disp: 15 g, Rfl: 0  •  omeprazole (PriLOSEC) 20 mg delayed release capsule, Take 1 capsule (20 mg total) by mouth daily, Disp: 90 capsule, Rfl: 3  •  polyethylene glycol (MIRALAX) 17 g packet, Take 17 g by mouth daily Do not start before November 26, 2022 , Disp: 510 g, Rfl: 0  •  potassium chloride (K-DUR,KLOR-CON) 10 mEq tablet, Take 1 tablet (10 mEq total) by mouth daily, Disp: 90 tablet, Rfl: 0  •  predniSONE 5 mg tablet, Take 1 tablet (5 mg total) by mouth 2 (two) times a day with meals, Disp: 60 tablet, Rfl: 0  •  senna-docusate sodium (SENOKOT S) 8 6-50 mg per tablet, Take 1 tablet by mouth daily, Disp: 30 tablet, Rfl: 0  •  triamcinolone (KENALOG) 0 1 % cream, Apply 1 application topically 2 (two) times a day for 14 days To affected area, Disp: 28 g, Rfl: 0  •  white petrolatum-mineral oil (EUCERIN,HYDROCERIN) cream, Apply topically 3 (three) times a day, Disp: 454 g, Rfl: 0  Family History   Problem Relation Age of Onset   • Asthma Mother    • Stroke Mother    • No Known Problems Father    • No Known Problems Sister    • No Known Problems Brother    • No Known Problems Maternal Grandmother    • No Known Problems Maternal Grandfather    • No Known Problems Paternal Grandmother    • No Known Problems Paternal Grandfather    • Diabetes Maternal Aunt    • Breast cancer Cousin    • Diabetes Cousin    • Breast cancer Other               Coordination of Care: Wound team aware of the treatment plan  Facility nurse will provide wound treatment and monitor the wound for any changes  Patient / Staff education : Patient / Staff was given education on sign of infection and pressure ulcer prevention  Patient/ Staff verbalized understanding     Follow up :  Next week    Voice-recognition software may have been used in the preparation of this document  Occasional wrong word or "sound-alike" substitutions may have occurred due to the inherent limitations of voice recognition software  Interpretation should be guided by context        AZIZA Vitale

## 2023-01-18 NOTE — ASSESSMENT & PLAN NOTE
Right posterior thigh  · Partial-thickness, with no obvious sign of infection  · I ordered Triad paste for local wound care  · Continue to offload, on air mattress  · Follow-up next week

## 2023-01-24 ENCOUNTER — NURSING HOME VISIT (OUTPATIENT)
Dept: WOUND CARE | Facility: HOSPITAL | Age: 73
End: 2023-01-24

## 2023-01-24 DIAGNOSIS — R26.2 AMBULATORY DYSFUNCTION: ICD-10-CM

## 2023-01-24 DIAGNOSIS — R32 DERMATITIS ASSOCIATED WITH INCONTINENCE: Primary | ICD-10-CM

## 2023-01-24 DIAGNOSIS — L25.8 DERMATITIS ASSOCIATED WITH INCONTINENCE: Primary | ICD-10-CM

## 2023-01-24 NOTE — PROGRESS NOTES
[address] [facility]NA[facility]   [facility]       Patient ID: Yuridia Encarnacion is a 74 y o  female Date of Birth 1950     Location of Service: [place] [address]    Performed wound round with: Wound team     Chief Complaint : Right posterior thigh    Wound Instructions:  Wound: Right posterior thigh  Discontinue previous wound order  Turn and reposition frequently,   Increase protein intake  Monitor for any sign of infection or worsening, inform PCP or patient's primary physician in your facility  Allergies  Methotrexate derivatives, Amoxicillin, Ampicillin-sulbactam sodium, and Shellfish-derived products - food allergy      Assessment & Plan:  1  Dermatitis associated with incontinence  Assessment & Plan:  Right posterior thigh  · Wound is healed  · Sign off      2  Ambulatory dysfunction  Assessment & Plan: On short-term rehab             Subjective:   January 17, 2023  This is a new consult for wound on the right posterior thigh  Patient was referred by the geriatric team   Patient have a complex medical history including but not limited to hypertension, arthritis, and GERD  Patient currently admitted at [place] [address] for short-term rehab  Wound history: As per report, patient was admitted with wound on the right posterior thigh  Possible etiology is dermatitis  Received patient in bed, seems comfortable  Denies pain  Needs moderate assistance with turning and repositioning when in bed  Patient have a good appetite  Patient is incontinent of bowel and bladder  Currently on pressure ulcer prevention protocol in the facility  January 24, 2023  Follow-up for wound on the left posterior thigh  Received patient in bed, seems comfortable  Denies pain  No significant issues related to the wound  Review of Systems   Constitutional: Negative  HENT: Negative  Eyes: Negative  Respiratory: Negative  Cardiovascular: Negative for chest pain and leg swelling  Gastrointestinal: Negative  Endocrine: Negative  Genitourinary: Negative  Musculoskeletal: Positive for gait problem  Skin: Positive for wound  See HPI   Neurological: Negative for dizziness and headaches  Psychiatric/Behavioral: Negative for behavioral problems  Objective:    Physical Exam  Constitutional:       Appearance: Normal appearance  HENT:      Head: Normocephalic and atraumatic  Nose: Nose normal       Mouth/Throat:      Pharynx: Oropharynx is clear  Eyes:      Conjunctiva/sclera: Conjunctivae normal    Pulmonary:      Effort: Pulmonary effort is normal    Abdominal:      Tenderness: There is no abdominal tenderness  There is no guarding  Genitourinary:     Comments: incontinent  Musculoskeletal:         General: No tenderness  Cervical back: Normal range of motion  Right lower leg: No edema  Left lower leg: No edema  Comments: LROM   Skin:     Findings: Lesion present  Comments: Right posterior thigh: Wound is healed   Neurological:      Mental Status: She is alert  Gait: Gait abnormal    Psychiatric:         Mood and Affect: Mood normal          Behavior: Behavior normal               Procedures           Patient's care was coordinated with nursing facility staff  Recent vitals, labs and updated medications were reviewed on EMR or chart system of facility  Past Medical, surgical, social, medication and allergy history and patient's previous records were reviewed and updated as appropriate: Most up-to date information is available in the facility EMR where the patient is currently admitted      Patient Active Problem List   Diagnosis   • SIRS (systemic inflammatory response syndrome) ([facility])   • Rheumatoid arthritis ([facility])   • GERD (gastroesophageal reflux disease)   • Sarcoid   • Benign essential hypertension   • Morbid obesity ([facility] )   • Lumbar radiculopathy   • Osteoporosis   • Vitamin D deficiency   • Lower extremity cellulitis   • BPPV (benign paroxysmal positional vertigo)   • Seasonal allergic rhinitis due to pollen   • Hyperparathyroidism ([facility] )   • Murmur, cardiac   • Paroxysmal atrial fibrillation ([facility])   • COVID-19   • Elevated troponin   • Gram-positive cocci bacteremia   • Pustular psoriasis   • Abnormal CT of the chest   • Assistance needed with transportation   • Acute bronchitis   • Acute diastolic heart failure ([facility])   • Hyperuricemia   • Ambulatory dysfunction   • Abnormal urinalysis   • Right shoulder pain   • Dermatitis associated with incontinence     Past Medical History:   Diagnosis Date   • Abnormal thyroid function test     last assessed: 2015    • Arthritis    • Caries     last assessed: 2016    • Continuous opioid dependence ([facility] ) 2021   • Edema of right lower extremity     last assessed: 2015    • GERD (gastroesophageal reflux disease)    • Hypertension    • Medicare annual wellness visit, subsequent 2021   • Positive blood culture 3/11/2022   • Sarcoid      Past Surgical History:   Procedure Laterality Date   •  SECTION     • MULTIPLE TOOTH EXTRACTIONS N/A 2016    Procedure: Surgical extraction of teeth 2, 18, 19, 30, 31; incision and drainage of left subperiosteal abscess ;  Surgeon: Sara Guzman DMD;  Location[facility];  Service:      Social History     Socioeconomic History   • Marital status: Single     Spouse name: None   • Number of children: None   • Years of education: None   • Highest education level: None   Occupational History   • Occupation: Retired: Worked for [employer] company    Tobacco Use   • Smoking status: Never   • Smokeless tobacco: Never   Vaping Use   • Vaping Use: Never used   Substance and Sexual Activity   • Alcohol use: Not Currently   • Drug use: No   • Sexual activity: Not Currently   Other Topics Concern   • None   Social History Narrative     noted in "allscripts"      Social Determinants of Health     Financial Resource Strain: Not on file   Food Insecurity: No Food Insecurity   • Worried About [employer] [employer] in the Last Year: Never true   • Ran Out of Food in the Last Year: Never true   Transportation Needs: No Transportation Needs   • Lack of Transportation (Medical): No   • Lack of Transportation (Non-Medical): No   Physical Activity: Not on file   Stress: Not on file   Social Connections: Not on file   Intimate Partner Violence: Not on file   Housing Stability: Low Risk    • Unable to Pay for Housing in the Last Year: No   • Number of Places Lived in the Last Year: 1   • Unstable Housing in the Last Year: No        Current Outpatient Medications:   •  acetaminophen (TYLENOL) 325 mg tablet, Take 2 tablets (650 mg total) by mouth every 4 (four) hours as needed for mild pain, headaches or fever  , Disp: 30 tablet, Rfl: 0  •  albuterol (Ventolin HFA) 90 mcg/act inhaler, Inhale 2 puffs every 6 (six) hours as needed for wheezing, Disp: 18 g, Rfl: 0  •  alendronate (FOSAMAX) 70 mg tablet, Take by mouth every 7 days , Disp: , Rfl:   •  apixaban (ELIQUIS) 5 mg, Take 1 tablet (5 mg total) by mouth 2 (two) times a day, Disp: 60 tablet, Rfl: 0  •  cholecalciferol (VITAMIN D3) 1,000 units tablet, Take 1 tablet (1,000 Units total) by mouth daily, Disp: 90 tablet, Rfl: 0  •  Clobetasol Propionate E 0 05 % emollient cream, APPLY TWICE A DAY FOR PALM SKIN DERMITITS, Disp: , Rfl:   •  furosemide (LASIX) 40 mg tablet, Take 1 tablet (40 mg total) by mouth daily, Disp: 30 tablet, Rfl: 0  •  gabapentin (Neurontin) 100 mg capsule, Take 1 capsule in AM and 3 capsules in PM, Disp: 120 capsule, Rfl: 1  •  guaifenesin-codeine (GUAIFENESIN AC) 100-10 MG/5ML liquid, Take 5 mL by mouth 3 (three) times a day as needed for cough, Disp: 180 mL, Rfl: 0  •  hydrocortisone 2 5 % ointment, Apply topically 4 (four) times a day as needed for irritation, Disp: 30 g, Rfl: 0  •  hydroxychloroquine (PLAQUENIL) 200 mg tablet, Take 1 tablet (200 mg total) by mouth 2 (two) times a day, Disp: 60 tablet, Rfl: 0  •  ketoconazole (NIZORAL) 2 % cream, Apply topically 2 (two) times a day, Disp: 60 g, Rfl: 0  •  lidocaine (LIDODERM) 5 %, Apply 2 patches topically daily Remove & Discard patch within 12 hours or as directed by MD Do not start before December 20, 2022 , Disp: , Rfl: 0  •  metoprolol tartrate (LOPRESSOR) 25 mg tablet, Take 1 tablet (25 mg total) by mouth every 12 (twelve) hours, Disp: 180 tablet, Rfl: 0  •  nystatin (MYCOSTATIN) powder, Apply topically 2 (two) times a day, Disp: 15 g, Rfl: 0  •  omeprazole (PriLOSEC) 20 mg delayed release capsule, Take 1 capsule (20 mg total) by mouth daily, Disp: 90 capsule, Rfl: 3  •  polyethylene glycol (MIRALAX) 17 g packet, Take 17 g by mouth daily Do not start before November 26, 2022 , Disp: 510 g, Rfl: 0  •  potassium chloride (K-DUR,KLOR-CON) 10 mEq tablet, Take 1 tablet (10 mEq total) by mouth daily, Disp: 90 tablet, Rfl: 0  •  predniSONE 5 mg tablet, Take 1 tablet (5 mg total) by mouth 2 (two) times a day with meals, Disp: 60 tablet, Rfl: 0  •  senna-docusate sodium (SENOKOT S) 8 6-50 mg per tablet, Take 1 tablet by mouth daily, Disp: 30 tablet, Rfl: 0  •  triamcinolone (KENALOG) 0 1 % cream, Apply 1 application topically 2 (two) times a day for 14 days To affected area, Disp: 28 g, Rfl: 0  •  white petrolatum-mineral oil (EUCERIN,HYDROCERIN) cream, Apply topically 3 (three) times a day, Disp: 454 g, Rfl: 0  Family History   Problem Relation Age of Onset   • Asthma Mother    • Stroke Mother    • No Known Problems Father    • No Known Problems Sister    • No Known Problems Brother    • No Known Problems Maternal Grandmother    • No Known Problems Maternal Grandfather    • No Known Problems Paternal Grandmother    • No Known Problems Paternal Grandfather    • Diabetes Maternal Aunt    • Breast cancer Cousin    • Diabetes Cousin    • Breast cancer Other               Coordination of Care: Wound team aware of the treatment plan   Facility nurse will provide wound treatment and monitor the wound for any changes  Patient / Staff education : Patient / Staff was given education on sign of infection and pressure ulcer prevention  Patient/ Staff verbalized understanding     Follow up :  Sign off    Voice-recognition software may have been used in the preparation of this document  Occasional wrong word or "sound-alike" substitutions may have occurred due to the inherent limitations of voice recognition software  Interpretation should be guided by context        AZIZA Ham

## 2023-05-26 ENCOUNTER — APPOINTMENT (EMERGENCY)
Dept: RADIOLOGY | Facility: HOSPITAL | Age: 73
DRG: 871 | End: 2023-05-26
Payer: MEDICARE

## 2023-05-26 ENCOUNTER — HOSPITAL ENCOUNTER (INPATIENT)
Facility: HOSPITAL | Age: 73
LOS: 6 days | Discharge: NURSING, REHAB OR LONG-TERM CARE | DRG: 871 | End: 2023-06-01
Attending: EMERGENCY MEDICINE | Admitting: INTERNAL MEDICINE
Payer: MEDICARE

## 2023-05-26 DIAGNOSIS — L40.1 PUSTULAR PSORIASIS: ICD-10-CM

## 2023-05-26 DIAGNOSIS — R50.9 FEVER: ICD-10-CM

## 2023-05-26 DIAGNOSIS — M06.9 RHEUMATOID ARTHRITIS INVOLVING MULTIPLE SITES, UNSPECIFIED WHETHER RHEUMATOID FACTOR PRESENT (HCC): ICD-10-CM

## 2023-05-26 DIAGNOSIS — J18.9 PNEUMONIA: Primary | ICD-10-CM

## 2023-05-26 DIAGNOSIS — R65.10 SIRS (SYSTEMIC INFLAMMATORY RESPONSE SYNDROME) (HCC): ICD-10-CM

## 2023-05-26 DIAGNOSIS — R05.9 COUGH: ICD-10-CM

## 2023-05-26 PROBLEM — I50.32 CHRONIC DIASTOLIC HEART FAILURE (HCC): Status: ACTIVE | Noted: 2022-11-19

## 2023-05-26 PROBLEM — R06.89 ACUTE RESPIRATORY INSUFFICIENCY: Status: ACTIVE | Noted: 2023-05-26

## 2023-05-26 LAB
ALBUMIN SERPL BCP-MCNC: 2.6 G/DL (ref 3.5–5)
ALP SERPL-CCNC: 76 U/L (ref 46–116)
ALT SERPL W P-5'-P-CCNC: 55 U/L (ref 12–78)
ANION GAP SERPL CALCULATED.3IONS-SCNC: 5 MMOL/L (ref 4–13)
APTT PPP: 33 SECONDS (ref 23–37)
AST SERPL W P-5'-P-CCNC: 54 U/L (ref 5–45)
BACTERIA UR QL AUTO: NORMAL /HPF
BASOPHILS # BLD AUTO: 0.02 THOUSANDS/ÂΜL (ref 0–0.1)
BASOPHILS NFR BLD AUTO: 0 % (ref 0–1)
BILIRUB SERPL-MCNC: 0.62 MG/DL (ref 0.2–1)
BILIRUB UR QL STRIP: NEGATIVE
BUN SERPL-MCNC: 25 MG/DL (ref 5–25)
CALCIUM ALBUM COR SERPL-MCNC: 10.3 MG/DL (ref 8.3–10.1)
CALCIUM SERPL-MCNC: 9.2 MG/DL (ref 8.3–10.1)
CHLORIDE SERPL-SCNC: 106 MMOL/L (ref 96–108)
CLARITY UR: CLEAR
CO2 SERPL-SCNC: 26 MMOL/L (ref 21–32)
COLOR UR: ABNORMAL
CREAT SERPL-MCNC: 1.59 MG/DL (ref 0.6–1.3)
EOSINOPHIL # BLD AUTO: 0.13 THOUSAND/ÂΜL (ref 0–0.61)
EOSINOPHIL NFR BLD AUTO: 2 % (ref 0–6)
ERYTHROCYTE [DISTWIDTH] IN BLOOD BY AUTOMATED COUNT: 13.9 % (ref 11.6–15.1)
FLUAV RNA RESP QL NAA+PROBE: NEGATIVE
FLUBV RNA RESP QL NAA+PROBE: NEGATIVE
GFR SERPL CREATININE-BSD FRML MDRD: 31 ML/MIN/1.73SQ M
GLUCOSE SERPL-MCNC: 86 MG/DL (ref 65–140)
GLUCOSE UR STRIP-MCNC: NEGATIVE MG/DL
HCT VFR BLD AUTO: 37.3 % (ref 34.8–46.1)
HGB BLD-MCNC: 11.5 G/DL (ref 11.5–15.4)
HGB UR QL STRIP.AUTO: NEGATIVE
IMM GRANULOCYTES # BLD AUTO: 0.02 THOUSAND/UL (ref 0–0.2)
IMM GRANULOCYTES NFR BLD AUTO: 0 % (ref 0–2)
INR PPP: 1.88 (ref 0.84–1.19)
KETONES UR STRIP-MCNC: NEGATIVE MG/DL
LACTATE SERPL-SCNC: 1.6 MMOL/L (ref 0.5–2)
LEUKOCYTE ESTERASE UR QL STRIP: NEGATIVE
LYMPHOCYTES # BLD AUTO: 0.71 THOUSANDS/ÂΜL (ref 0.6–4.47)
LYMPHOCYTES NFR BLD AUTO: 9 % (ref 14–44)
MCH RBC QN AUTO: 28.6 PG (ref 26.8–34.3)
MCHC RBC AUTO-ENTMCNC: 30.8 G/DL (ref 31.4–37.4)
MCV RBC AUTO: 93 FL (ref 82–98)
MONOCYTES # BLD AUTO: 0.85 THOUSAND/ÂΜL (ref 0.17–1.22)
MONOCYTES NFR BLD AUTO: 10 % (ref 4–12)
NEUTROPHILS # BLD AUTO: 6.57 THOUSANDS/ÂΜL (ref 1.85–7.62)
NEUTS SEG NFR BLD AUTO: 79 % (ref 43–75)
NITRITE UR QL STRIP: NEGATIVE
NON-SQ EPI CELLS URNS QL MICRO: NORMAL /HPF
NRBC BLD AUTO-RTO: 0 /100 WBCS
PH UR STRIP.AUTO: 6 [PH]
PLATELET # BLD AUTO: 203 THOUSANDS/UL (ref 149–390)
PMV BLD AUTO: 12 FL (ref 8.9–12.7)
POTASSIUM SERPL-SCNC: 4.2 MMOL/L (ref 3.5–5.3)
PROCALCITONIN SERPL-MCNC: 0.16 NG/ML
PROT SERPL-MCNC: 7.3 G/DL (ref 6.4–8.4)
PROT UR STRIP-MCNC: ABNORMAL MG/DL
PROTHROMBIN TIME: 21.8 SECONDS (ref 11.6–14.5)
RBC # BLD AUTO: 4.02 MILLION/UL (ref 3.81–5.12)
RBC #/AREA URNS AUTO: NORMAL /HPF
RSV RNA RESP QL NAA+PROBE: NEGATIVE
SARS-COV-2 RNA RESP QL NAA+PROBE: NEGATIVE
SODIUM SERPL-SCNC: 137 MMOL/L (ref 135–147)
SP GR UR STRIP.AUTO: 1.02 (ref 1–1.03)
UROBILINOGEN UR STRIP-ACNC: <2 MG/DL
WBC # BLD AUTO: 8.3 THOUSAND/UL (ref 4.31–10.16)
WBC #/AREA URNS AUTO: NORMAL /HPF

## 2023-05-26 PROCEDURE — 0241U HB NFCT DS VIR RESP RNA 4 TRGT: CPT | Performed by: EMERGENCY MEDICINE

## 2023-05-26 PROCEDURE — 93005 ELECTROCARDIOGRAM TRACING: CPT

## 2023-05-26 PROCEDURE — 36415 COLL VENOUS BLD VENIPUNCTURE: CPT | Performed by: EMERGENCY MEDICINE

## 2023-05-26 PROCEDURE — 85730 THROMBOPLASTIN TIME PARTIAL: CPT | Performed by: EMERGENCY MEDICINE

## 2023-05-26 PROCEDURE — 84145 PROCALCITONIN (PCT): CPT | Performed by: EMERGENCY MEDICINE

## 2023-05-26 PROCEDURE — 96366 THER/PROPH/DIAG IV INF ADDON: CPT

## 2023-05-26 PROCEDURE — 87040 BLOOD CULTURE FOR BACTERIA: CPT | Performed by: EMERGENCY MEDICINE

## 2023-05-26 PROCEDURE — 83605 ASSAY OF LACTIC ACID: CPT | Performed by: EMERGENCY MEDICINE

## 2023-05-26 PROCEDURE — 85610 PROTHROMBIN TIME: CPT | Performed by: EMERGENCY MEDICINE

## 2023-05-26 PROCEDURE — 99223 1ST HOSP IP/OBS HIGH 75: CPT | Performed by: INTERNAL MEDICINE

## 2023-05-26 PROCEDURE — 96365 THER/PROPH/DIAG IV INF INIT: CPT

## 2023-05-26 PROCEDURE — 81001 URINALYSIS AUTO W/SCOPE: CPT | Performed by: EMERGENCY MEDICINE

## 2023-05-26 PROCEDURE — 96375 TX/PRO/DX INJ NEW DRUG ADDON: CPT

## 2023-05-26 PROCEDURE — 80053 COMPREHEN METABOLIC PANEL: CPT | Performed by: EMERGENCY MEDICINE

## 2023-05-26 PROCEDURE — 71046 X-RAY EXAM CHEST 2 VIEWS: CPT

## 2023-05-26 PROCEDURE — 85025 COMPLETE CBC W/AUTO DIFF WBC: CPT | Performed by: EMERGENCY MEDICINE

## 2023-05-26 PROCEDURE — 99285 EMERGENCY DEPT VISIT HI MDM: CPT

## 2023-05-26 RX ORDER — HYDROXYCHLOROQUINE SULFATE 200 MG/1
200 TABLET, FILM COATED ORAL 2 TIMES DAILY
Status: DISCONTINUED | OUTPATIENT
Start: 2023-05-27 | End: 2023-06-01 | Stop reason: HOSPADM

## 2023-05-26 RX ORDER — ACETAMINOPHEN 325 MG/1
650 TABLET ORAL EVERY 4 HOURS PRN
Status: DISCONTINUED | OUTPATIENT
Start: 2023-05-26 | End: 2023-06-01 | Stop reason: HOSPADM

## 2023-05-26 RX ORDER — ALBUTEROL SULFATE 90 UG/1
2 AEROSOL, METERED RESPIRATORY (INHALATION) EVERY 6 HOURS PRN
Status: DISCONTINUED | OUTPATIENT
Start: 2023-05-26 | End: 2023-06-01 | Stop reason: HOSPADM

## 2023-05-26 RX ORDER — PREDNISONE 5 MG/1
5 TABLET ORAL 2 TIMES DAILY WITH MEALS
Status: DISCONTINUED | OUTPATIENT
Start: 2023-05-27 | End: 2023-05-31

## 2023-05-26 RX ORDER — ONDANSETRON 2 MG/ML
4 INJECTION INTRAMUSCULAR; INTRAVENOUS EVERY 6 HOURS PRN
Status: DISCONTINUED | OUTPATIENT
Start: 2023-05-26 | End: 2023-06-01 | Stop reason: HOSPADM

## 2023-05-26 RX ORDER — NYSTATIN 100000 [USP'U]/G
POWDER TOPICAL 2 TIMES DAILY
Status: DISCONTINUED | OUTPATIENT
Start: 2023-05-27 | End: 2023-06-01 | Stop reason: HOSPADM

## 2023-05-26 RX ORDER — PANTOPRAZOLE SODIUM 40 MG/1
40 TABLET, DELAYED RELEASE ORAL
Status: DISCONTINUED | OUTPATIENT
Start: 2023-05-27 | End: 2023-06-01 | Stop reason: HOSPADM

## 2023-05-26 RX ORDER — GABAPENTIN 100 MG/1
100 CAPSULE ORAL DAILY
Status: DISCONTINUED | OUTPATIENT
Start: 2023-05-27 | End: 2023-06-01 | Stop reason: HOSPADM

## 2023-05-26 RX ORDER — LIDOCAINE 50 MG/G
2 PATCH TOPICAL DAILY
Status: DISCONTINUED | OUTPATIENT
Start: 2023-05-27 | End: 2023-06-01 | Stop reason: HOSPADM

## 2023-05-26 RX ORDER — GABAPENTIN 300 MG/1
300 CAPSULE ORAL
Status: DISCONTINUED | OUTPATIENT
Start: 2023-05-27 | End: 2023-06-01 | Stop reason: HOSPADM

## 2023-05-26 RX ORDER — MELATONIN
1000 DAILY
Status: DISCONTINUED | OUTPATIENT
Start: 2023-05-27 | End: 2023-06-01 | Stop reason: HOSPADM

## 2023-05-26 RX ORDER — POTASSIUM CHLORIDE 750 MG/1
10 TABLET, EXTENDED RELEASE ORAL DAILY
Status: DISCONTINUED | OUTPATIENT
Start: 2023-05-27 | End: 2023-06-01 | Stop reason: HOSPADM

## 2023-05-26 RX ADMIN — VANCOMYCIN HYDROCHLORIDE 1250 MG: 10 INJECTION, POWDER, LYOPHILIZED, FOR SOLUTION INTRAVENOUS at 22:53

## 2023-05-26 RX ADMIN — CEFEPIME 2000 MG: 2 INJECTION, POWDER, FOR SOLUTION INTRAVENOUS at 21:21

## 2023-05-26 NOTE — ED PROVIDER NOTES
History  Chief Complaint   Patient presents with   • Lethargy     Per EMS pt has had increased lethargy and has a cough  Per EMS pt had a fever 101 6      Pt is a 66yo F who presents for altered mental status  EMS reports that patient's facility called due to her not acting her normal self  They report that this started today  Patient comes with paperwork that states that she has had a cough for approximately 1 week and had an outpatient x-ray that did not show any evidence of consolidation several days ago  Patient reports that she has been coughing but denies any other associated symptoms  She denies any shortness of breath, fevers, chills  EMS reports that patient was febrile when they picked her up  Patient states that she takes her medications regularly but is unsure exactly what she takes  Patient unable to provide further history  Prior to Admission Medications   Prescriptions Last Dose Informant Patient Reported? Taking? Clobetasol Propionate E 0 05 % emollient cream Unknown Self, Outside Facility (Specify) Yes No   Sig: APPLY TWICE A DAY FOR PALM SKIN DERMITITS   acetaminophen (TYLENOL) 325 mg tablet Past Week Self, Outside Facility (Specify) No Yes   Sig: Take 2 tablets (650 mg total) by mouth every 4 (four) hours as needed for mild pain, headaches or fever     albuterol (Ventolin HFA) 90 mcg/act inhaler Past Week Outside Facility (Specify) No Yes   Sig: Inhale 2 puffs every 6 (six) hours as needed for wheezing   alendronate (FOSAMAX) 70 mg tablet Past Week Self, Outside Facility (Specify) Yes Yes   Sig: Take by mouth every 7 days    apixaban (ELIQUIS) 5 mg 5/26/2023 Outside Facility (Specify) No Yes   Sig: Take 1 tablet (5 mg total) by mouth 2 (two) times a day   cholecalciferol (VITAMIN D3) 1,000 units tablet 5/26/2023 Outside Facility (Specify) No Yes   Sig: Take 1 tablet (1,000 Units total) by mouth daily   furosemide (LASIX) 40 mg tablet 5/26/2023  No Yes   Sig: Take 1 tablet (40 mg total) by mouth daily   gabapentin (Neurontin) 100 mg capsule 5/26/2023 Outside Facility (Specify) No Yes   Sig: Take 1 capsule in AM and 3 capsules in PM   guaiFENesin (EQ 12 Hour Mucus Relief) 600 mg 12 hr tablet Past Week  Yes Yes   Sig: Take 1,200 mg by mouth every 12 (twelve) hours   guaifenesin-codeine (GUAIFENESIN AC) 100-10 MG/5ML liquid Not Taking Outside Facility (Specify) No No   Sig: Take 5 mL by mouth 3 (three) times a day as needed for cough   Patient not taking: Reported on 5/27/2023   hydrocortisone 2 5 % ointment 5/26/2023  No Yes   Sig: Apply topically 4 (four) times a day as needed for irritation   hydroxychloroquine (PLAQUENIL) 200 mg tablet 5/26/2023 Outside Facility (Specify) No Yes   Sig: Take 1 tablet (200 mg total) by mouth 2 (two) times a day   ketoconazole (NIZORAL) 2 % cream 5/26/2023 Self, Outside Facility (Specify) No Yes   Sig: Apply topically 2 (two) times a day   lidocaine (LIDODERM) 5 % 5/27/2023  No Yes   Sig: Apply 2 patches topically daily Remove & Discard patch within 12 hours or as directed by MD Do not start before December 20, 2022  metoprolol tartrate (LOPRESSOR) 25 mg tablet 5/26/2023 Outside Facility (Specify) No Yes   Sig: Take 1 tablet (25 mg total) by mouth every 12 (twelve) hours   nystatin (MYCOSTATIN) powder Unknown Outside Facility (Specify) No No   Sig: Apply topically 2 (two) times a day   omeprazole (PriLOSEC) 20 mg delayed release capsule 5/26/2023 Outside Facility (Specify) No Yes   Sig: Take 1 capsule (20 mg total) by mouth daily   polyethylene glycol (MIRALAX) 17 g packet 5/26/2023 Outside Facility (Specify) No Yes   Sig: Take 17 g by mouth daily Do not start before November 26, 2022     potassium chloride (K-DUR,KLOR-CON) 10 mEq tablet 5/26/2023 Outside Facility (Specify) No Yes   Sig: Take 1 tablet (10 mEq total) by mouth daily   predniSONE 5 mg tablet 5/26/2023 Outside Facility (Specify) No Yes   Sig: Take 1 tablet (5 mg total) by mouth 2 (two) times a day with meals   senna-docusate sodium (SENOKOT S) 8 6-50 mg per tablet  Outside Facility (Specify) No No   Sig: Take 1 tablet by mouth daily   triamcinolone (KENALOG) 0 1 % cream 2023 Outside Facility (Specify) No Yes   Sig: Apply 1 application topically 2 (two) times a day for 14 days To affected area   white petrolatum-mineral oil (EUCERIN,HYDROCERIN) cream Unknown Self, Outside Facility (Specify) No No   Sig: Apply topically 3 (three) times a day      Facility-Administered Medications: None       Past Medical History:   Diagnosis Date   • Abnormal thyroid function test     last assessed: 2015    • Arthritis    • Caries     last assessed: 2016    • Continuous opioid dependence ([facility] [facility] ) 2021   • Edema of right lower extremity     last assessed: 2015    • GERD (gastroesophageal reflux disease)    • Hypertension    • Medicare annual wellness visit, subsequent 2021   • Positive blood culture 3/11/2022   • Sarcoid        Past Surgical History:   Procedure Laterality Date   •  SECTION     • MULTIPLE TOOTH EXTRACTIONS N/A 2016    Procedure: Surgical extraction of teeth 2, 18, 19, 30, 31; incision and drainage of left subperiosteal abscess ;  Surgeon: Barb Bautista DMD;  Location: [facility];  Service:        Family History   Problem Relation Age of Onset   • Asthma Mother    • Stroke Mother    • No Known Problems Father    • No Known Problems Sister    • No Known Problems Brother    • No Known Problems Maternal Grandmother    • No Known Problems Maternal Grandfather    • No Known Problems Paternal Grandmother    • No Known Problems Paternal Grandfather    • Diabetes Maternal Aunt    • Breast cancer Cousin    • Diabetes Cousin    • Breast cancer Other      I have reviewed and agree with the history as documented      E-Cigarette/Vaping   • E-Cigarette Use Never User      E-Cigarette/Vaping Substances   • Nicotine No    • THC No    • CBD No    • Flavoring No    • Other No    • Unknown No      Social History     Tobacco Use   • Smoking status: Never   • Smokeless tobacco: Never   Vaping Use   • Vaping Use: Never used   Substance Use Topics   • Alcohol use: Not Currently   • Drug use: No        Review of Systems   Constitutional: Positive for fever  Respiratory: Positive for cough  Psychiatric/Behavioral: Positive for confusion (Per facility)  All other systems reviewed and are negative  Physical Exam  ED Triage Vitals   Temperature Pulse Respirations Blood Pressure SpO2   05/26/23 2123 05/26/23 1945 05/26/23 1945 05/26/23 1945 05/26/23 1945   100 3 °F (37 9 °C) 102 20 134/61 95 %      Temp Source Heart Rate Source Patient Position - Orthostatic VS BP Location FiO2 (%)   05/27/23 1731 05/26/23 1945 05/26/23 1945 05/26/23 1945 --   Oral Monitor Lying Right arm       Pain Score       05/27/23 0834       No Pain             Orthostatic Vital Signs  Vitals:    05/31/23 1527 05/31/23 2129 06/01/23 0732 06/01/23 1005   BP: 142/63 134/59 134/66 (!) 127/48   Pulse: 81 88 75 84   Patient Position - Orthostatic VS:  Lying Lying        Physical Exam  Vitals reviewed  Constitutional:       General: She is not in acute distress  Appearance: She is well-developed  She is obese  She is not toxic-appearing or diaphoretic  HENT:      Head: Normocephalic and atraumatic  Right Ear: External ear normal       Left Ear: External ear normal       Nose: Nose normal       Mouth/Throat:      Pharynx: Oropharynx is clear  Eyes:      Extraocular Movements: Extraocular movements intact  Pupils: Pupils are equal, round, and reactive to light  Cardiovascular:      Rate and Rhythm: Regular rhythm  Tachycardia present  Heart sounds: Normal heart sounds  Pulmonary:      Effort: Tachypnea present  No respiratory distress  Breath sounds: No wheezing or rales  Comments: Decreased breath sounds diffusely  Abdominal:      General: There is no distension  Palpations: Abdomen is soft  Tenderness: There is no abdominal tenderness  Musculoskeletal:         General: Normal range of motion  Cervical back: Normal range of motion and neck supple  Skin:     General: Skin is warm and dry  Capillary Refill: Capillary refill takes less than 2 seconds  Neurological:      Mental Status: She is alert  Comments: A&O x2  Answering most questions appropriately  Moving all extremities and following commands   Psychiatric:         Speech: Speech normal          Behavior: Behavior is cooperative  ED Medications  Medications   cefepime (MAXIPIME) 2 g/50 mL dextrose IVPB (0 mg Intravenous Stopped 5/26/23 2253)   vancomycin (VANCOCIN) 1,250 mg in sodium chloride 0 9 % 250 mL IVPB (0 mg Intravenous Stopped 5/27/23 0043)   sodium chloride 0 9 % bolus 500 mL (0 mL Intravenous Stopped 5/29/23 0630)       Diagnostic Studies  Results Reviewed     Procedure Component Value Units Date/Time    Blood culture #1 [[id number]] Collected: 05/26/23 2109    Lab Status: Final result Specimen: Blood from Arm, Left Updated: 06/01/23 0001     Blood Culture No Growth After 5 Days  Blood culture #2 [[id number]] Collected: 05/26/23 2109    Lab Status: Final result Specimen: Blood from Arm, Right Updated: 06/01/23 0001     Blood Culture No Growth After 5 Days      MRSA culture [[id number]]  (Normal) Collected: 05/27/23 0359    Lab Status: Final result Specimen: Nares from Nose Updated: 05/28/23 1133     MRSA Culture Only No Methicillin Resistant Staphlyococcus aureus (MRSA) isolated    Basic metabolic panel [[id number]]  (Abnormal) Collected: 05/28/23 0568    Lab Status: Final result Specimen: Blood from Arm, Right Updated: 05/28/23 0432     Sodium 138 mmol/L      Potassium 3 9 mmol/L      Chloride 108 mmol/L      CO2 28 mmol/L      ANION GAP 2 mmol/L      BUN 24 mg/dL      Creatinine 1 50 mg/dL      Glucose 100 mg/dL      Calcium 9 1 mg/dL      eGFR 34 ml/min/1 73sq m Narrative:      National Kidney Disease Foundation guidelines for Chronic Kidney Disease (CKD):   •  Stage 1 with normal or high GFR (GFR > 90 mL/min/1 73 square meters)  •  Stage 2 Mild CKD (GFR = 60-89 mL/min/1 73 square meters)  •  Stage 3A Moderate CKD (GFR = 45-59 mL/min/1 73 square meters)  •  Stage 3B Moderate CKD (GFR = 30-44 mL/min/1 73 square meters)  •  Stage 4 Severe CKD (GFR = 15-29 mL/min/1 73 square meters)  •  Stage 5 End Stage CKD (GFR <15 mL/min/1 73 square meters)  Note: GFR calculation is accurate only with a steady state creatinine    CBC and differential [[id number]]  (Abnormal) Collected: 05/28/23 0552    Lab Status: Final result Specimen: Blood from Arm, Right Updated: 05/28/23 0626     WBC 7 92 Thousand/uL      RBC 3 86 Million/uL      Hemoglobin 11 2 g/dL      Hematocrit 35 5 %      MCV 92 fL      MCH 29 0 pg      MCHC 31 5 g/dL      RDW 13 6 %      MPV 11 3 fL      Platelets 226 Thousands/uL      nRBC 0 /100 WBCs      Neutrophils Relative 73 %      Immat GRANS % 1 %      Lymphocytes Relative 12 %      Monocytes Relative 10 %      Eosinophils Relative 4 %      Basophils Relative 0 %      Neutrophils Absolute 5 83 Thousands/µL      Immature Grans Absolute 0 06 Thousand/uL      Lymphocytes Absolute 0 96 Thousands/µL      Monocytes Absolute 0 76 Thousand/µL      Eosinophils Absolute 0 29 Thousand/µL      Basophils Absolute 0 02 Thousands/µL     Strep Pneumoniae, Urine [[id number]]  (Normal) Collected: 05/27/23 0042    Lab Status: Final result Specimen: Urine, Other Updated: 05/27/23 0827     Strep pneumoniae antigen, urine Negative    Legionella antigen, Urine [[id number]]  (Normal) Collected: 05/27/23 0042    Lab Status: Final result Specimen: Urine, Other Updated: 05/27/23 0826     Legionella Urinary Antigen Negative    B-Type Natriuretic Peptide(BNP) [[id number]]  (Abnormal) Collected: 05/27/23 0643    Lab Status: Final result Specimen: Blood from Arm, Left Updated: 05/27/23 0713      pg/mL     Procalcitonin, Next Day AM Collection [[id number]]  (Normal) Collected: 05/27/23 0512    Lab Status: Final result Specimen: Blood from Arm, Right Updated: 05/27/23 0551     Procalcitonin 0 15 ng/ml     Basic metabolic panel, AM Draw, Tomorrow [[id number]]  (Abnormal) Collected: 05/27/23 0512    Lab Status: Final result Specimen: Blood from Arm, Right Updated: 05/27/23 0538     Sodium 140 mmol/L      Potassium 4 1 mmol/L      Chloride 108 mmol/L      CO2 29 mmol/L      ANION GAP 3 mmol/L      BUN 26 mg/dL      Creatinine 1 57 mg/dL      Glucose 73 mg/dL      Calcium 8 9 mg/dL      eGFR 32 ml/min/1 73sq m     Narrative:      [place] guidelines for Chronic Kidney Disease (CKD):   •  Stage 1 with normal or high GFR (GFR > 90 mL/min/1 73 square meters)  •  Stage 2 Mild CKD (GFR = 60-89 mL/min/1 73 square meters)  •  Stage 3A Moderate CKD (GFR = 45-59 mL/min/1 73 square meters)  •  Stage 3B Moderate CKD (GFR = 30-44 mL/min/1 73 square meters)  •  Stage 4 Severe CKD (GFR = 15-29 mL/min/1 73 square meters)  •  Stage 5 End Stage CKD (GFR <15 mL/min/1 73 square meters)  Note: GFR calculation is accurate only with a steady state creatinine    CBC and Platelet, AM Draw, Tomorrow [[id number]]  (Abnormal) Collected: 05/27/23 0512    Lab Status: Final result Specimen: Blood from Arm, Right Updated: 05/27/23 0524     WBC 6 78 Thousand/uL      RBC 3 79 Million/uL      Hemoglobin 10 9 g/dL      Hematocrit 35 2 %      MCV 93 fL      MCH 28 8 pg      MCHC 31 0 g/dL      RDW 13 9 %      Platelets 935 Thousands/uL      MPV 11 2 fL     Urine Microscopic [[id number]]  (Normal) Collected: 05/26/23 2327    Lab Status: Final result Specimen: Urine, Other Updated: 05/26/23 2340     RBC, UA 1-2 /hpf      WBC, UA 1-2 /hpf      Epithelial Cells Occasional /hpf      Bacteria, UA None Seen /hpf     UA w Reflex to Microscopic w Reflex to Culture [[id number]]  (Abnormal) Collected: 05/26/23 5430    Lab Status: Final result Specimen: Urine, Other Updated: 05/26/23 2340     Color, UA Light Yellow     Clarity, UA Clear     Specific Gravity, UA 1 016     pH, UA 6 0     Leukocytes, UA Negative     Nitrite, UA Negative     Protein, UA Trace mg/dl      Glucose, UA Negative mg/dl      Ketones, UA Negative mg/dl      Urobilinogen, UA <2 0 mg/dl      Bilirubin, UA Negative     Occult Blood, UA Negative    Procalcitonin [[id number]]  (Normal) Collected: 05/26/23 2109    Lab Status: Final result Specimen: Blood from Arm, Right Updated: 05/26/23 2220     Procalcitonin 0 16 ng/ml     FLU/RSV/COVID - if FLU/RSV clinically relevant [[id number]]  (Normal) Collected: 05/26/23 2109    Lab Status: Final result Specimen: Nares from Nose Updated: 05/26/23 2211     SARS-CoV-2 Negative     INFLUENZA A PCR Negative     INFLUENZA B PCR Negative     RSV PCR Negative    Narrative:      FOR PEDIATRIC PATIENTS - copy/paste COVID Guidelines URL to browser: [email or web address]  [employer]    SARS-CoV-2 assay is a Nucleic Acid Amplification assay intended for the  qualitative detection of nucleic acid from SARS-CoV-2 in nasopharyngeal  swabs  Results are for the presumptive identification of SARS-CoV-2 RNA  Positive results are indicative of infection with SARS-CoV-2, the virus  causing COVID-19, but do not rule out bacterial infection or co-infection  with other viruses  Laboratories within the [place] and its  territories are required to report all positive results to the appropriate  public health authorities  Negative results do not preclude SARS-CoV-2  infection and should not be used as the sole basis for treatment or other  patient management decisions  Negative results must be combined with  clinical observations, patient history, and epidemiological information  This test has not been FDA cleared or approved  This test has been authorized by FDA under an Emergency Use Authorization  (EUA)   This test is only authorized for the duration of time the  declaration that circumstances exist justifying the authorization of the  emergency use of an in vitro diagnostic tests for detection of SARS-CoV-2  virus and/or diagnosis of COVID-19 infection under section 564(b)(1) of  the Act, 21 U  S C  454DGC-6(W)(2), unless the authorization is terminated  or revoked sooner  The test has been validated but independent review by FDA  and CLIA is pending  Test performed using [employer] GeneXpert: This RT-PCR assay targets N2,  a region unique to SARS-CoV-2  A conserved region in the E-gene was chosen  for pan-Sarbecovirus detection which includes SARS-CoV-2  According to CMS-2020-01-R, this platform meets the definition of high-throughput technology      Protime-INR [[id number]]  (Abnormal) Collected: 05/26/23 2109    Lab Status: Final result Specimen: Blood from Arm, Right Updated: 05/26/23 2144     Protime 21 8 seconds      INR 1 88    APTT [[id number]]  (Normal) Collected: 05/26/23 2109    Lab Status: Final result Specimen: Blood from Arm, Right Updated: 05/26/23 2144     PTT 33 seconds     Comprehensive metabolic panel [[id number]]  (Abnormal) Collected: 05/26/23 2109    Lab Status: Final result Specimen: Blood from Arm, Right Updated: 05/26/23 2142     Sodium 137 mmol/L      Potassium 4 2 mmol/L      Chloride 106 mmol/L      CO2 26 mmol/L      ANION GAP 5 mmol/L      BUN 25 mg/dL      Creatinine 1 59 mg/dL      Glucose 86 mg/dL      Calcium 9 2 mg/dL      Corrected Calcium 10 3 mg/dL      AST 54 U/L      ALT 55 U/L      Alkaline Phosphatase 76 U/L      Total Protein 7 3 g/dL      Albumin 2 6 g/dL      Total Bilirubin 0 62 mg/dL      eGFR 31 ml/min/1 73sq m     Anila:      [place] guidelines for Chronic Kidney Disease (CKD):   •  Stage 1 with normal or high GFR (GFR > 90 mL/min/1 73 square meters)  •  Stage 2 Mild CKD (GFR = 60-89 mL/min/1 73 square meters)  •  Stage 3A Moderate CKD (GFR = 45-59 mL/min/1 73 square meters)  •  Stage 3B Moderate CKD (GFR = 30-44 mL/min/1 73 square meters)  •  Stage 4 Severe CKD (GFR = 15-29 mL/min/1 73 square meters)  •  Stage 5 End Stage CKD (GFR <15 mL/min/1 73 square meters)  Note: GFR calculation is accurate only with a steady state creatinine    Lactic acid [[id number]]  (Normal) Collected: 05/26/23 2109    Lab Status: Final result Specimen: Blood from Arm, Right Updated: 05/26/23 2140     LACTIC ACID 1 6 mmol/L     Narrative:      Result may be elevated if tourniquet was used during collection  CBC and differential [[id number]]  (Abnormal) Collected: 05/26/23 2109    Lab Status: Final result Specimen: Blood from Arm, Right Updated: 05/26/23 2118     WBC 8 30 Thousand/uL      RBC 4 02 Million/uL      Hemoglobin 11 5 g/dL      Hematocrit 37 3 %      MCV 93 fL      MCH 28 6 pg      MCHC 30 8 g/dL      RDW 13 9 %      MPV 12 0 fL      Platelets 871 Thousands/uL      nRBC 0 /100 WBCs      Neutrophils Relative 79 %      Immat GRANS % 0 %      Lymphocytes Relative 9 %      Monocytes Relative 10 %      Eosinophils Relative 2 %      Basophils Relative 0 %      Neutrophils Absolute 6 57 Thousands/µL      Immature Grans Absolute 0 02 Thousand/uL      Lymphocytes Absolute 0 71 Thousands/µL      Monocytes Absolute 0 85 Thousand/µL      Eosinophils Absolute 0 13 Thousand/µL      Basophils Absolute 0 02 Thousands/µL                  XR hand 3+ vw left   Final Result by Cindy Davis DO (05/31 4356)      No acute osseous abnormality  Diffuse soft tissue swelling  If soft tissue pathology is of clinical concern, MRI or contrast-enhanced CT may be of further value  Degenerative changes as above  Widening of the scapholunate interval suggesting ligamentous disruption  This could be verified with MRI if clinically relevant        Workstation performed: [id number]         VAS upper limb venous duplex scan, unilateral/limited   Final Result by Kvng DO [place] (05/30 1223)      XR chest pa & lateral   Final Result by Namon Dakins, MD (05/27 5352)      No acute cardiopulmonary findings  Workstation performed: [id number]               Procedures  Procedures      ED Course  ED Course as of 06/02/23 1708   Fri May 26, 2023   2129 WBC: 8 30  WNL   2129 CBC and differential(!)  Reviewed and without actionable derangement  2142 LACTIC ACID: 1 6  WNL   2143 Creatinine(!): 1 59  Increased from previously but not meeting criteria for AMANDA  2144 Comprehensive metabolic panel(!)  Reviewed and without actionable derangement  2146 PTT: 33  WNL   2146 POCT INR(!): 1 88  Likely elevated 2/2 Eliquis use  2211 FLU/RSV/COVID - if FLU/RSV clinically relevant  Negative  2222 Procalcitonin: 0 16  WNL   2227 XR chest pa & lateral  Possible R sided infiltrate on wet read  Abx already in process  0 Pt and family made aware of results and plan for admission  All agreeable  2244 [facility] contacted for admission  Medical Decision Making  Pt is a 64yo F who presents with cough  Exam pertinent for tachycardia and tachypnea  Differential diagnosis to include but not limited to pneumonia, viral syndrome, sepsis  Patient meeting SIRS criteria and therefore will proceed with sepsis labs as well as COVID, flu, RSV and chest x-ray  Patient found to have pneumonia on chest x-ray and antibiotic started  See ED course for results and details  Plan to admit pt to [facility]  Pt discussed with admitting team and admission orders placed  Pt admitted without incident  Amount and/or Complexity of Data Reviewed  Labs: ordered  Decision-making details documented in ED Course  Radiology:  Decision-making details documented in ED Course  Risk  Decision regarding hospitalization              Disposition  Final diagnoses:   Pneumonia   Cough   Fever     Time reflects when diagnosis was documented in both MDM as applicable and the Disposition within this note     Time User Action Codes Description Comment    5/26/2023 10:54 PM Beau Oddi Add [J18 9] Pneumonia     5/26/2023 10:54 PM Beau Oddi Add [R05 9] Cough     5/26/2023 10:54 PM Beau Oddi Add [R50 9] Fever     5/29/2023  2:25 PM Grzegorz Quant Add [M06 9] Rheumatoid arthritis involving multiple sites, unspecified whether rheumatoid factor present 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       ED Disposition     ED Disposition   Admit    Condition   Stable    Date/Time   Fri May 26, 2023 10:54 PM    Comment   Case was discussed with XENIA and the patient's admission status was agreed to be Admission Status: inpatient status to the service of Dr Rozina Sexton MD Documentation    [address] Brittnee Navas Name, [address]  [facility]  [facility], [address]    (Name & Tel number) Roundtrip   Transported by (Company and Unit #) [id number] claimed the ride for Jacki in unit/room  bed -01, and will arrive on 06/01/2023 at 3:00pm EDT  Contact them at [phone]  RN Documentation    Flowsheet Row Most Recent Value   Accepting Facility Name, [address] [place] [address] Rd  [address]  [facility], [address]    (Name & Tel number) Roundtrip   Transported by (Company and Unit #) [id number] [employer] [employer] [address] claimed the ride for Jacki in unit/room  bed -01, and will arrive on 06/01/2023 at 3:00pm EDT  Contact them at [phone]     Level of Care Basic life support   Transfer Date 06/01/23   Transfer Time 1500      Follow-up Information     Follow up With Specialties Details Why Harley Chi MD Dermatology, Pediatric Dermatology Follow up [facility] needs an appointment to be seen within 2-4 weeks for psorasis treatment  Please arrange for an appointment [address] [facility]  [phone]      Tish Recinos [facility] Follow up  [address]  [address] [place] [address] [address] [place] [address]      Melinda Real MD Rheumatology Schedule an appointment as soon as possible for a visit in 4 week(s)  [address]  [address]            Discharge Medication List as of 6/1/2023  1:10 PM      START taking these medications    Details   clobetasol (TEMOVATE) 0 05 % cream Apply topically 2 (two) times a day, Starting u 6/1/2023, Normal         CONTINUE these medications which have CHANGED    Details   ! ! predniSONE 20 mg tablet Take 2 tablets (40 mg total) by mouth daily for 3 days, Starting Thu 6/1/2023, Until Sun 6/4/2023, Normal      !! predniSONE 5 mg tablet Take 1 tablet (5 mg total) by mouth 2 (two) times a day with meals Do not start before June 5, 2023 , Starting Mon 6/5/2023, Normal       !! - Potential duplicate medications found  Please discuss with provider        CONTINUE these medications which have NOT CHANGED    Details   acetaminophen (TYLENOL) 325 mg tablet Take 2 tablets (650 mg total) by mouth every 4 (four) hours as needed for mild pain, headaches or fever , Starting Mon 8/29/2016, Print      albuterol (Ventolin HFA) 90 mcg/act inhaler Inhale 2 puffs every 6 (six) hours as needed for wheezing, Starting Wed 11/16/2022, Normal      alendronate (FOSAMAX) 70 mg tablet Take by mouth every 7 days , Historical Med      apixaban (ELIQUIS) 5 mg Take 1 tablet (5 mg total) by mouth 2 (two) times a day, Starting Thu 11/10/2022, Normal      cholecalciferol (VITAMIN D3) 1,000 units tablet Take 1 tablet (1,000 Units total) by mouth daily, Starting Thu 11/10/2022, Normal furosemide (LASIX) 40 mg tablet Take 1 tablet (40 mg total) by mouth daily, Starting Thu 12/15/2022, Until Sat 5/27/2023, Normal      gabapentin (Neurontin) 100 mg capsule Take 1 capsule in AM and 3 capsules in PM, Normal      hydrocortisone 2 5 % ointment Apply topically 4 (four) times a day as needed for irritation, Starting Mon 12/19/2022, Normal      hydroxychloroquine (PLAQUENIL) 200 mg tablet Take 1 tablet (200 mg total) by mouth 2 (two) times a day, Starting Thu 11/10/2022, Until Sat 5/27/2023, Normal      ketoconazole (NIZORAL) 2 % cream Apply topically 2 (two) times a day, Starting Tue 7/23/2019, Normal      lidocaine (LIDODERM) 5 % Apply 2 patches topically daily Remove & Discard patch within 12 hours or as directed by MD Do not start before December 20, 2022 , Starting Tue 12/20/2022, No Print      metoprolol tartrate (LOPRESSOR) 25 mg tablet Take 1 tablet (25 mg total) by mouth every 12 (twelve) hours, Starting Thu 11/10/2022, Normal      omeprazole (PriLOSEC) 20 mg delayed release capsule Take 1 capsule (20 mg total) by mouth daily, Starting Thu 11/10/2022, Normal      polyethylene glycol (MIRALAX) 17 g packet Take 17 g by mouth daily Do not start before November 26, 2022 , Starting Sat 11/26/2022, Until Sat 5/27/2023, No Print      potassium chloride (K-DUR,KLOR-CON) 10 mEq tablet Take 1 tablet (10 mEq total) by mouth daily, Starting Thu 11/10/2022, Normal      triamcinolone (KENALOG) 0 1 % cream Apply 1 application topically 2 (two) times a day for 14 days To affected area, Starting Tue 7/12/2022, Until Sat 5/27/2023, Normal      Clobetasol Propionate E 0 05 % emollient cream APPLY TWICE A DAY FOR PALM SKIN DERMITITS, Historical Med      nystatin (MYCOSTATIN) powder Apply topically 2 (two) times a day, Starting Fri 11/25/2022, No Print      senna-docusate sodium (SENOKOT S) 8 6-50 mg per tablet Take 1 tablet by mouth daily, Starting Fri 11/25/2022, Until Sun 12/25/2022, No Print      white petrolatum-mineral oil (EUCERIN,HYDROCERIN) cream Apply topically 3 (three) times a day, Starting Tue 3/15/2022, Normal         STOP taking these medications       guaiFENesin (EQ 12 Hour Mucus Relief) 600 mg 12 hr tablet Comments:   Reason for Stopping:         guaifenesin-codeine (GUAIFENESIN AC) 100-10 MG/5ML liquid Comments:   Reason for Stopping:             Outpatient Discharge Orders   Discharge Diet     Discharge Condtion:  Stabilized     Patient Aware of Diagnosis: Yes     Free of Communicable Disease:   Yes     Physical Therapy Eval And Treat     Occupational Therapy Eval and Treat     Activity:  As Tolerated       PDMP Review       Value Time User    PDMP Reviewed  Yes 5/26/2023 11:45 PM Tamera Hammonds DO           ED Provider  Attending physically available and evaluated [facility]  I managed the patient along with the ED Attending      Electronically Signed by         Rosetta Alexander MD  06/02/23 [id number]

## 2023-05-27 PROBLEM — J18.9 PNEUMONIA OF RIGHT LOWER LOBE DUE TO INFECTIOUS ORGANISM: Status: ACTIVE | Noted: 2023-05-26

## 2023-05-27 PROBLEM — N18.9 CHRONIC KIDNEY DISEASE: Status: ACTIVE | Noted: 2023-05-27

## 2023-05-27 LAB
ANION GAP SERPL CALCULATED.3IONS-SCNC: 3 MMOL/L (ref 4–13)
BNP SERPL-MCNC: 216 PG/ML (ref 0–100)
BUN SERPL-MCNC: 26 MG/DL (ref 5–25)
CALCIUM SERPL-MCNC: 8.9 MG/DL (ref 8.3–10.1)
CHLORIDE SERPL-SCNC: 108 MMOL/L (ref 96–108)
CO2 SERPL-SCNC: 29 MMOL/L (ref 21–32)
CREAT SERPL-MCNC: 1.57 MG/DL (ref 0.6–1.3)
ERYTHROCYTE [DISTWIDTH] IN BLOOD BY AUTOMATED COUNT: 13.9 % (ref 11.6–15.1)
GFR SERPL CREATININE-BSD FRML MDRD: 32 ML/MIN/1.73SQ M
GLUCOSE SERPL-MCNC: 73 MG/DL (ref 65–140)
HCT VFR BLD AUTO: 35.2 % (ref 34.8–46.1)
HGB BLD-MCNC: 10.9 G/DL (ref 11.5–15.4)
L PNEUMO1 AG UR QL IA.RAPID: NEGATIVE
MCH RBC QN AUTO: 28.8 PG (ref 26.8–34.3)
MCHC RBC AUTO-ENTMCNC: 31 G/DL (ref 31.4–37.4)
MCV RBC AUTO: 93 FL (ref 82–98)
PLATELET # BLD AUTO: 195 THOUSANDS/UL (ref 149–390)
PMV BLD AUTO: 11.2 FL (ref 8.9–12.7)
POTASSIUM SERPL-SCNC: 4.1 MMOL/L (ref 3.5–5.3)
PROCALCITONIN SERPL-MCNC: 0.15 NG/ML
RBC # BLD AUTO: 3.79 MILLION/UL (ref 3.81–5.12)
S PNEUM AG UR QL: NEGATIVE
SODIUM SERPL-SCNC: 140 MMOL/L (ref 135–147)
WBC # BLD AUTO: 6.78 THOUSAND/UL (ref 4.31–10.16)

## 2023-05-27 PROCEDURE — 99233 SBSQ HOSP IP/OBS HIGH 50: CPT | Performed by: INTERNAL MEDICINE

## 2023-05-27 PROCEDURE — 92610 EVALUATE SWALLOWING FUNCTION: CPT

## 2023-05-27 PROCEDURE — 94760 N-INVAS EAR/PLS OXIMETRY 1: CPT

## 2023-05-27 PROCEDURE — 94640 AIRWAY INHALATION TREATMENT: CPT

## 2023-05-27 PROCEDURE — 84145 PROCALCITONIN (PCT): CPT | Performed by: INTERNAL MEDICINE

## 2023-05-27 PROCEDURE — 87081 CULTURE SCREEN ONLY: CPT | Performed by: INTERNAL MEDICINE

## 2023-05-27 PROCEDURE — 83880 ASSAY OF NATRIURETIC PEPTIDE: CPT | Performed by: INTERNAL MEDICINE

## 2023-05-27 PROCEDURE — 85027 COMPLETE CBC AUTOMATED: CPT | Performed by: INTERNAL MEDICINE

## 2023-05-27 PROCEDURE — 36415 COLL VENOUS BLD VENIPUNCTURE: CPT | Performed by: INTERNAL MEDICINE

## 2023-05-27 PROCEDURE — 87449 NOS EACH ORGANISM AG IA: CPT | Performed by: INTERNAL MEDICINE

## 2023-05-27 PROCEDURE — 80048 BASIC METABOLIC PNL TOTAL CA: CPT | Performed by: INTERNAL MEDICINE

## 2023-05-27 RX ORDER — ALBUTEROL SULFATE 2.5 MG/3ML
2.5 SOLUTION RESPIRATORY (INHALATION) EVERY 4 HOURS PRN
Status: DISCONTINUED | OUTPATIENT
Start: 2023-05-27 | End: 2023-05-30

## 2023-05-27 RX ORDER — GUAIFENESIN 600 MG/1
1200 TABLET, EXTENDED RELEASE ORAL EVERY 12 HOURS SCHEDULED
COMMUNITY
End: 2023-06-01

## 2023-05-27 RX ORDER — LEVALBUTEROL INHALATION SOLUTION 0.63 MG/3ML
0.63 SOLUTION RESPIRATORY (INHALATION)
Status: DISCONTINUED | OUTPATIENT
Start: 2023-05-27 | End: 2023-05-27

## 2023-05-27 RX ADMIN — APIXABAN 5 MG: 5 TABLET, FILM COATED ORAL at 09:01

## 2023-05-27 RX ADMIN — GABAPENTIN 100 MG: 100 CAPSULE ORAL at 09:01

## 2023-05-27 RX ADMIN — CEFTRIAXONE SODIUM 2000 MG: 10 INJECTION, POWDER, FOR SOLUTION INTRAVENOUS at 20:39

## 2023-05-27 RX ADMIN — METOPROLOL TARTRATE 25 MG: 25 TABLET, FILM COATED ORAL at 22:00

## 2023-05-27 RX ADMIN — NYSTATIN: 100000 POWDER TOPICAL at 17:40

## 2023-05-27 RX ADMIN — GABAPENTIN 300 MG: 300 CAPSULE ORAL at 21:53

## 2023-05-27 RX ADMIN — NYSTATIN 1 APPLICATION.: 100000 POWDER TOPICAL at 09:02

## 2023-05-27 RX ADMIN — HYDROXYCHLOROQUINE SULFATE 200 MG: 200 TABLET ORAL at 20:39

## 2023-05-27 RX ADMIN — PANTOPRAZOLE SODIUM 40 MG: 40 TABLET, DELAYED RELEASE ORAL at 05:10

## 2023-05-27 RX ADMIN — IPRATROPIUM BROMIDE 0.5 MG: 0.5 SOLUTION RESPIRATORY (INHALATION) at 21:09

## 2023-05-27 RX ADMIN — PREDNISONE 5 MG: 5 TABLET ORAL at 08:11

## 2023-05-27 RX ADMIN — LEVALBUTEROL HYDROCHLORIDE 0.63 MG: 0.63 SOLUTION RESPIRATORY (INHALATION) at 09:05

## 2023-05-27 RX ADMIN — Medication 1000 UNITS: at 09:01

## 2023-05-27 RX ADMIN — LIDOCAINE 5% 2 PATCH: 700 PATCH TOPICAL at 09:03

## 2023-05-27 RX ADMIN — ACETAMINOPHEN 650 MG: 325 TABLET ORAL at 22:01

## 2023-05-27 RX ADMIN — LEVALBUTEROL HYDROCHLORIDE 0.63 MG: 0.63 SOLUTION RESPIRATORY (INHALATION) at 14:39

## 2023-05-27 RX ADMIN — LEVALBUTEROL HYDROCHLORIDE 0.63 MG: 0.63 SOLUTION RESPIRATORY (INHALATION) at 21:09

## 2023-05-27 RX ADMIN — APIXABAN 5 MG: 5 TABLET, FILM COATED ORAL at 17:40

## 2023-05-27 RX ADMIN — POTASSIUM CHLORIDE 10 MEQ: 750 TABLET, EXTENDED RELEASE ORAL at 09:04

## 2023-05-27 RX ADMIN — PREDNISONE 5 MG: 5 TABLET ORAL at 16:56

## 2023-05-27 RX ADMIN — GABAPENTIN 300 MG: 300 CAPSULE ORAL at 02:17

## 2023-05-27 RX ADMIN — METOPROLOL TARTRATE 25 MG: 25 TABLET, FILM COATED ORAL at 02:17

## 2023-05-27 RX ADMIN — IPRATROPIUM BROMIDE 0.5 MG: 0.5 SOLUTION RESPIRATORY (INHALATION) at 14:39

## 2023-05-27 RX ADMIN — HYDROXYCHLOROQUINE SULFATE 200 MG: 200 TABLET ORAL at 09:01

## 2023-05-27 RX ADMIN — IPRATROPIUM BROMIDE 0.5 MG: 0.5 SOLUTION RESPIRATORY (INHALATION) at 09:05

## 2023-05-27 NOTE — PLAN OF CARE
Problem: MOBILITY - ADULT  Goal: Maintain or return to baseline ADL function  Description: INTERVENTIONS:  -  Assess patient's ability to carry out ADLs; assess patient's baseline for ADL function and identify physical deficits which impact ability to perform ADLs (bathing, care of mouth/teeth, toileting, grooming, dressing, etc )  - Assess/evaluate cause of self-care deficits   - Assess range of motion  - Assess patient's mobility; develop plan if impaired  - Assess patient's need for assistive devices and provide as appropriate  - Encourage maximum independence but intervene and supervise when necessary  - Involve family in performance of ADLs  - Assess for home care needs following discharge   - Consider OT consult to assist with ADL evaluation and planning for discharge  - Provide patient education as appropriate  Outcome: Progressing  Goal: Maintains/Returns to pre admission functional level  Description: INTERVENTIONS:  - Perform BMAT or MOVE assessment daily    - Set and communicate daily mobility goal to care team and patient/family/caregiver     - Collaborate with rehabilitation services on mobility goals if consulted  - Out of bed for toileting  - Record patient progress and toleration of activity level   Outcome: Progressing     Problem: NEUROSENSORY - ADULT  Goal: Achieves stable or improved neurological status  Description: INTERVENTIONS  - Monitor and report changes in neurological status  - Monitor vital signs such as temperature, blood pressure, glucose, and any other labs ordered   - Initiate measures to prevent increased intracranial pressure  - Monitor for seizure activity and implement precautions if appropriate      Outcome: Progressing     Problem: RESPIRATORY - ADULT  Goal: Achieves optimal ventilation and oxygenation  Description: INTERVENTIONS:  - Assess for changes in respiratory status  - Assess for changes in mentation and behavior  - Position to facilitate oxygenation and minimize respiratory effort  - Oxygen administered by appropriate delivery if ordered  - Initiate smoking cessation education as indicated  - Encourage broncho-pulmonary hygiene including cough, deep breathe, Incentive Spirometry  - Assess the need for suctioning and aspirate as needed  - Assess and instruct to report SOB or any respiratory difficulty  - Respiratory Therapy support as indicated  Outcome: Progressing     Problem: GENITOURINARY - ADULT  Goal: Absence of urinary retention  Description: INTERVENTIONS:  - Assess patient’s ability to void and empty bladder  - Monitor I/O  - Bladder scan as needed  - Discuss with physician/AP medications to alleviate retention as needed  - Discuss catheterization for long term situations as appropriate  Outcome: Progressing     Problem: MUSCULOSKELETAL - ADULT  Goal: Maintain or return mobility to safest level of function  Description: INTERVENTIONS:  - Assess patient's ability to carry out ADLs; assess patient's baseline for ADL function and identify physical deficits which impact ability to perform ADLs (bathing, care of mouth/teeth, toileting, grooming, dressing, etc )  - Assess/evaluate cause of self-care deficits   - Assess range of motion  - Assess patient's mobility  - Assess patient's need for assistive devices and provide as appropriate  - Encourage maximum independence but intervene and supervise when necessary  - Involve family in performance of ADLs  - Assess for home care needs following discharge   - Consider OT consult to assist with ADL evaluation and planning for discharge  - Provide patient education as appropriate  Outcome: Progressing     Problem: PAIN - ADULT  Goal: Verbalizes/displays adequate comfort level or baseline comfort level  Description: Interventions:  - Encourage patient to monitor pain and request assistance  - Assess pain using appropriate pain scale  - Administer analgesics based on type and severity of pain and evaluate response  - Implement non-pharmacological measures as appropriate and evaluate response  - Consider cultural and social influences on pain and pain management  - Notify physician/advanced practitioner if interventions unsuccessful or patient reports new pain  Outcome: Progressing     Problem: SAFETY ADULT  Goal: Patient will remain free of falls  Description: INTERVENTIONS:  - Educate patient/family on patient safety including physical limitations  - Instruct patient to call for assistance with activity   - Consult OT/PT to assist with strengthening/mobility   - Keep Call bell within reach  - Keep bed low and locked with side rails adjusted as appropriate  - Keep care items and personal belongings within reach  - Initiate and maintain comfort rounds  - Make Fall Risk Sign visible to staff  - Apply yellow socks and bracelet for high fall risk patients  - Consider moving patient to room near nurses station  Outcome: Progressing

## 2023-05-27 NOTE — H&P
[address] [facility]  H&P  Name: Haider Apodaca 74 y o  female I MRN: [id number]  Unit/Bed#: ED 21 I Date of Admission: 5/26/2023   Date of Service: 5/27/2023 I Hospital Day: 1      Assessment/Plan   * Pneumonia of right lower lobe due to infectious organism  Assessment & Plan  · Suspected, patient with 5-6-day history of increasing lethargy, cough, reported fevers and with low-grade fever here  · CXR to wet read with possible right lower lobe pneumonia however await final read  Of note procalcitonin negative and without leukocytosis  Multiplex negative  · Given low-grade fever however continuing treatment with ceftriaxone at this juncture pending serial procalcitonin's, blood cultures x2, urine Legionella/strep  · Trend WBC, temperature curve, hemodynamics    Chronic diastolic heart failure (HCC)  Assessment & Plan  Wt Readings from Last 3 Encounters:   12/16/22 120 kg (264 lb 15 9 oz)   12/08/22 120 kg (265 lb 9 6 oz)   11/25/22 122 kg (268 lb 8 oz)     · Difficult to assess volume status due to morbid obesity however family reporting some increased pedal edema  Patient unsure if she has gained weight  · We will check BNP, low threshold to consider diuresis if pneumonia work-up is negative  · Continue remainder of cardiac medications  Monitor volume status closely      Paroxysmal atrial fibrillation (HCC)  Assessment & Plan  · Currently in sinus rhythm  Continue with metoprolol tartrate at home dose  · Continue anticoagulation with Eliquis    Lumbar radiculopathy  Assessment & Plan  · Symptoms stable per patient, continue home dose gabapentin    Morbid obesity ([address] )  Assessment & Plan  · Dietary and lifestyle modification advised  Morbid obesity complicates all facets of care         VTE Prophylaxis: Enoxaparin (Lovenox)  / sequential compression device   Code Status: Level 1 - Full Code  POLST: POLST form is not discussed and not completed at this time    Discussion with family: Daughter and 2 granddaughters at bedside    Anticipated Length of Stay:  Patient will be admitted on an Inpatient basis with an anticipated length of stay of greater than 2 midnights  Justification for Hospital Stay: Please see detailed plans noted above  Chief Complaint:     Lethargy and fever  History of Present Illness:  Serenity Green is a 74 y o  female who presents from SNF with concerns of increased lethargy and reported fever/cough at [facility]  Daughter is present at bedside and supplements history  Apparently for the past 5 to 6 days patient has had gradual increase in lethargy and fatigue with decreased activity to where she is essentially bedbound at this time  During this period she noted increasing nonproductive cough and today developed a fever which ultimately prompted the presentation  Family is noting some increased pedal edema but patient denies any overt shortness of breath, chest pain/pressure, abdominal pain/nausea/vomiting/diarrhea, apparent weight gain, or dizziness/lightheadedness  During ED evaluation patient noted trial after arrival with low-grade fever  Work-up included CXR with concerns for right lower lobe pneumonia for which IV antibiotics were initiated and patient admitted for further management of the lethargy with possible pneumonia as etiology versus other etiology      Review of Systems:    Constitutional:  Denies chills but reported fever and fatigue  Eyes:  Denies change in visual acuity   HENT:  Denies nasal congestion or sore throat   Respiratory:  Denies shortness of breath but reported cough  Cardiovascular:  Denies chest pain or edema   GI:  Denies abdominal pain, nausea, vomiting, bloody stools or diarrhea   :  Denies dysuria   Musculoskeletal:  Denies back pain or joint pain   Integument:  Denies rash   Neurologic:  Denies headache, focal weakness or sensory changes   Endocrine:  Denies polyuria or polydipsia   Lymphatic:  Denies swollen glands   Psychiatric: Denies depression or anxiety     Past Medical and Surgical History:   Past Medical History:   Diagnosis Date   • Abnormal thyroid function test     last assessed: 2015    • Arthritis    • Caries     last assessed: 2016    • Continuous opioid dependence ([facility] [address] ) 2021   • Edema of right lower extremity     last assessed: 2015    • GERD (gastroesophageal reflux disease)    • Hypertension    • Medicare annual wellness visit, subsequent 2021   • Positive blood culture 3/11/2022   • Sarcoid      Past Surgical History:   Procedure Laterality Date   •  SECTION     • MULTIPLE TOOTH EXTRACTIONS N/A 2016    Procedure: Surgical extraction of teeth 2, 18, 19, 30, 31; incision and drainage of left subperiosteal abscess ;  Surgeon: Andreia rCow DMD;  Location: [facility];  Service:        Meds/Allergies:    Current Facility-Administered Medications:   •  acetaminophen (TYLENOL) tablet 650 mg, 650 mg, Oral, Q4H PRN, Ba Levy, DO  •  albuterol (PROVENTIL HFA,VENTOLIN HFA) inhaler 2 puff, 2 puff, Inhalation, Q6H PRN, Ba Levy, DO  •  apixaban (ELIQUIS) tablet 5 mg, 5 mg, Oral, BID, Ba Levy, DO  •  cefTRIAXone (ROCEPHIN) 2,000 mg in dextrose 5 % 50 mL IVPB, 2,000 mg, Intravenous, Q24H, Ba Levy, DO  •  cholecalciferol (VITAMIN D3) tablet 1,000 Units, 1,000 Units, Oral, Daily, Ba Levy, DO  •  gabapentin (NEURONTIN) capsule 100 mg, 100 mg, Oral, Daily, Ba Levy, DO  •  gabapentin (NEURONTIN) capsule 300 mg, 300 mg, Oral, HS, Ba Levy DO, 300 mg at 23 0217  •  hydrocortisone 2 5 % ointment, , Topical, 4x Daily PRN, Ba Levy, DO  •  hydroxychloroquine (PLAQUENIL) tablet 200 mg, 200 mg, Oral, BID, Ba Levy, DO  •  lidocaine (LIDODERM) 5 % patch 2 patch, 2 patch, Topical, Daily, Ba Levy, DO  •  metoprolol tartrate (LOPRESSOR) tablet 25 mg, 25 mg, Oral, Q12H [address], Ba Levy DO, 25 mg at 23 0217  •  nystatin (MYCOSTATIN) powder, , Topical, BID, Poonam Rung, DO  •  ondansetron (ZOFRAN) injection 4 mg, 4 mg, Intravenous, Q6H PRN, Poonam Rung, DO  •  pantoprazole (PROTONIX) EC tablet 40 mg, 40 mg, Oral, Early Morning, Poonam Rung, DO  •  potassium chloride (K-DUR,KLOR-CON) CR tablet 10 mEq, 10 mEq, Oral, Daily, Poonam Rung, DO  •  predniSONE tablet 5 mg, 5 mg, Oral, BID With Meals, Poonam Rung, DO    Current Outpatient Medications:   •  acetaminophen (TYLENOL) 325 mg tablet, Take 2 tablets (650 mg total) by mouth every 4 (four) hours as needed for mild pain, headaches or fever  , Disp: 30 tablet, Rfl: 0  •  albuterol (Ventolin HFA) 90 mcg/act inhaler, Inhale 2 puffs every 6 (six) hours as needed for wheezing, Disp: 18 g, Rfl: 0  •  alendronate (FOSAMAX) 70 mg tablet, Take by mouth every 7 days , Disp: , Rfl:   •  apixaban (ELIQUIS) 5 mg, Take 1 tablet (5 mg total) by mouth 2 (two) times a day, Disp: 60 tablet, Rfl: 0  •  cholecalciferol (VITAMIN D3) 1,000 units tablet, Take 1 tablet (1,000 Units total) by mouth daily, Disp: 90 tablet, Rfl: 0  •  Clobetasol Propionate E 0 05 % emollient cream, APPLY TWICE A DAY FOR PALM SKIN DERMITITS, Disp: , Rfl:   •  furosemide (LASIX) 40 mg tablet, Take 1 tablet (40 mg total) by mouth daily, Disp: 30 tablet, Rfl: 0  •  gabapentin (Neurontin) 100 mg capsule, Take 1 capsule in AM and 3 capsules in PM, Disp: 120 capsule, Rfl: 1  •  guaifenesin-codeine (GUAIFENESIN AC) 100-10 MG/5ML liquid, Take 5 mL by mouth 3 (three) times a day as needed for cough, Disp: 180 mL, Rfl: 0  •  hydrocortisone 2 5 % ointment, Apply topically 4 (four) times a day as needed for irritation, Disp: 30 g, Rfl: 0  •  hydroxychloroquine (PLAQUENIL) 200 mg tablet, Take 1 tablet (200 mg total) by mouth 2 (two) times a day, Disp: 60 tablet, Rfl: 0  •  ketoconazole (NIZORAL) 2 % cream, Apply topically 2 (two) times a day, Disp: 60 g, Rfl: 0  •  lidocaine (LIDODERM) 5 %, Apply 2 patches topically daily Remove & Discard patch within 12 hours or as directed by MD Do not start before December 20, 2022 , Disp: , Rfl: 0  •  metoprolol tartrate (LOPRESSOR) 25 mg tablet, Take 1 tablet (25 mg total) by mouth every 12 (twelve) hours, Disp: 180 tablet, Rfl: 0  •  nystatin (MYCOSTATIN) powder, Apply topically 2 (two) times a day, Disp: 15 g, Rfl: 0  •  omeprazole (PriLOSEC) 20 mg delayed release capsule, Take 1 capsule (20 mg total) by mouth daily, Disp: 90 capsule, Rfl: 3  •  polyethylene glycol (MIRALAX) 17 g packet, Take 17 g by mouth daily Do not start before November 26, 2022 , Disp: 510 g, Rfl: 0  •  potassium chloride (K-DUR,KLOR-CON) 10 mEq tablet, Take 1 tablet (10 mEq total) by mouth daily, Disp: 90 tablet, Rfl: 0  •  predniSONE 5 mg tablet, Take 1 tablet (5 mg total) by mouth 2 (two) times a day with meals, Disp: 60 tablet, Rfl: 0  •  senna-docusate sodium (SENOKOT S) 8 6-50 mg per tablet, Take 1 tablet by mouth daily, Disp: 30 tablet, Rfl: 0  •  triamcinolone (KENALOG) 0 1 % cream, Apply 1 application topically 2 (two) times a day for 14 days To affected area, Disp: 28 g, Rfl: 0  •  white petrolatum-mineral oil (EUCERIN,HYDROCERIN) cream, Apply topically 3 (three) times a day, Disp: 454 g, Rfl: 0    Allergies:    Allergies   Allergen Reactions   • Methotrexate Derivatives    • Amoxicillin Rash   • Ampicillin-Sulbactam Sodium Rash   • Shellfish-Derived Products - Food Allergy Itching     History:  Marital Status: Single     Substance Use History:   Social History     Substance and Sexual Activity   Alcohol Use Not Currently     Social History     Tobacco Use   Smoking Status Never   Smokeless Tobacco Never     Social History     Substance and Sexual Activity   Drug Use No       Family History:  Family History   Problem Relation Age of Onset   • Asthma Mother    • Stroke Mother    • No Known Problems Father    • No Known Problems Sister    • No Known Problems Brother    • No Known Problems Maternal Grandmother    • No Known Problems Maternal Grandfather    • No Known Problems Paternal Grandmother    • No Known Problems Paternal Grandfather    • Diabetes Maternal Aunt    • Breast cancer Cousin    • Diabetes Cousin    • Breast cancer Other        Physical Exam:     Vitals:   Blood Pressure: 133/62 (05/27/23 0217)  Pulse: 93 (05/27/23 0217)  Temperature: 100 3 °F (37 9 °C) (05/26/23 2123)  Respirations: 20 (05/26/23 1945)  SpO2: 95 % (05/26/23 1945)    Constitutional:  Well developed, morbidly obese, no acute distress, non-toxic appearance   Eyes:  PERRL, conjunctiva normal   HENT:  Atraumatic, external ears normal, nose normal, oropharynx moist, no pharyngeal exudates  Neck- normal range of motion, no tenderness, supple   Respiratory:  No respiratory distress, diminished breath sounds bilaterally, no rales, no wheezing   Cardiovascular:  Normal rate, normal rhythm, no murmurs, no gallops, no rubs   GI:  Soft, nondistended, normal bowel sounds, nontender, no organomegaly, no mass, no rebound, no guarding   :  No costovertebral angle tenderness   Musculoskeletal:  No edema, no tenderness, no deformities  Back- no tenderness  Integument:  Well hydrated, multiple maculopapular rashes with overlying plaques  Lymphatic:  No lymphadenopathy noted   Neurologic: Sleepy but awakens readily to voice and answers questions appropriately however remaining fatigue, communicative, CN 2-12 normal, normal motor function, normal sensory function, no focal deficits noted   Psychiatric:  Speech and behavior appropriate       Lab Results: I have personally reviewed pertinent reports        Results from last 7 days   Lab Units 05/26/23 2109   EOS PCT % 2   HEMATOCRIT % 37 3   HEMOGLOBIN g/dL 11 5   LYMPHS PCT % 9*   MONOS PCT % 10   NEUTROS PCT % 79*   PLATELETS Thousands/uL 203   WBC Thousand/uL 8 30     Results from last 7 days   Lab Units 05/26/23 2109   ALK PHOS U/L 76   ALT U/L 55   AST U/L 54*   BUN mg/dL 25   CALCIUM mg/dL 9 2   CHLORIDE mmol/L 106   CO2 mmol/L 26   CREATININE mg/dL 1 59*   POTASSIUM mmol/L 4 2     Results from last 7 days   Lab Units 05/26/23  2109   INR  1 88*         Imaging: I have personally reviewed pertinent films in PACS    CXR: Personally reviewed, mild increased opacity in the right lower lobe possibly representing pneumonia  Final radiology read is pending  ** Please Note: Dragon 360 Dictation voice to text software was used in the creation of this document   **

## 2023-05-27 NOTE — SPEECH THERAPY NOTE
Speech Language/Pathology  Speech-Language Pathology Bedside Swallow Evaluation      Patient Name: Radha Snider    OTIHF'CHAIM Date: 2023     Problem List  Principal Problem:    Pneumonia of right lower lobe due to infectious organism  Active Problems:    Rheumatoid arthritis ([facility] [address] )    Morbid obesity ([facility] [address] )    Lumbar radiculopathy    Paroxysmal atrial fibrillation (HCC)    Chronic diastolic heart failure ([facility] [address] )    Chronic kidney disease      Past Medical History  Past Medical History:   Diagnosis Date   • Abnormal thyroid function test     last assessed: 2015    • Arthritis    • Caries     last assessed: 2016    • Continuous opioid dependence ([facility] [address] ) 2021   • Edema of right lower extremity     last assessed: 2015    • GERD (gastroesophageal reflux disease)    • Hypertension    • Medicare annual wellness visit, subsequent 2021   • Positive blood culture 3/11/2022   • Sarcoid        Past Surgical History  Past Surgical History:   Procedure Laterality Date   •  SECTION     • MULTIPLE TOOTH EXTRACTIONS N/A 2016    Procedure: Surgical extraction of teeth 2, 18, 19, 30, 31; incision and drainage of left subperiosteal abscess ;  Surgeon: Favio Karimi DMD;  Location: [facility];  Service:        Summary   Pt presented with functional appearing oral and pharyngeal stage swallowing skills with materials administered today  She lacks mobility where she lives and is mostly in a w/c or bed for meals placing her at higher risk for aspiration  Noted on previous VBS was a full esophagus w/ reduced motility         Risk/s for Aspiration: poor mobility    Recommended Diet: regular diet and thin liquids   Recommended Form of Meds: whole with liquid   Aspiration precautions and swallowing strategies: upright posture, only feed when fully alert, slow rate of feeding, small bites/sips and alternating bites and sips  Other Recommendations: Continue frequent oral care, will follow as able         Current Medical Status  Pt is a 74 y o  female who presented to [facility] with cough and lethargy  This has been going on for 5-6 days  Now w/ RLL pna    Current Precautions:  Fall  Aspiration  Contact  AIrborn  C diff   Seizure  Delirium    Allergies:  No known food allergies    Past medical history:  Please see H&P for details    Special Studies:  VBS 1/18/22 Summary:  Images are on PACS for review  Pt presents w/ mild oropharyngeal dysphagia venkata by mildly prolonged mastication and oral manipulation, delayed swallow initiation, and min-mild pharyngeal residue  Pt w/ slow transfer of material  All material spills to the valleculae prior to delayed hyo-laryngeal excursion  Min-mild vallecular retention noted after the swallow w/ subsequent pooling in the pyriforms  No penetration or aspiration on today's study  Brief screening of the esophagus revealed slow motility and retention  Recommendations:  Diet: Regular   Liquids: Thin   Meds: As tolerated  Strategies: Mult  Swallows   Upright position  F/u ST tx: Yes, brief to review VBS findings and recommendations     Social/Education/Vocational Hx:  [facility]     Swallow Information   Current Risks for Dysphagia & Aspiration: known history of dysphagia  Current Symptoms/Concerns: change in respiratory status  Current Diet: regular diet and thin liquids   Baseline Diet: regular diet and thin liquids      Baseline Assessment   Behavior/Cognition: alert  Speech/Language Status: able to participate in conversation and able to follow commands  Patient Positioning: upright in bed  Pain Status/Interventions/Response to Interventions:   No report of or nonverbal indications of pain         Swallow Mechanism Exam  Facial: symmetrical  Labial: WFL  Lingual: WFL  Velum: unable to visualize  Mandible: adequate ROM  Dentition: adequate  Vocal quality:weak and hoarse   Volitional Cough: strong/productive Respiratory Status: on RA      Consistencies Assessed and Performance   Consistencies Administered: thin liquids, puree, mechanical soft solids and hard solids    Oral Stage: WFL  Mastication was adequate with the materials administered today  Bolus formation and transfer were functional with no significant oral residue noted  No overt s/s reduced oral control  Pharyngeal Stage: WFL  Swallow Mechanics:  Swallowing initiation appeared prompt  Laryngeal rise was palpated and judged to be within functional limits  Periodic mult swallows during assessment  No gross coughing  Esophageal Concerns: none reported    Strategies and Efficacy: -    Summary and Recommendations (see above)    Results Reviewed with: patient and RN     Treatment Recommended: will briefly follow for any changes     Frequency of treatment: as able     Patient Stated Goal:-    Dysphagia LTG  -Patient will demonstrate safe and effective oral intake (without overt s/s significant oral/pharyngeal dysphagia including s/s penetration or aspiration) for the highest appropriate diet level       Speech Therapy Prognosis   Prognosis: fair    Prognosis Considerations: medical status, cognitive status and lack of mobility

## 2023-05-27 NOTE — ASSESSMENT & PLAN NOTE
· Suspected, patient with 5-6-day history of increasing lethargy, cough, reported fevers and with low-grade fever here  · CXR to wet read with possible right lower lobe pneumonia however await final read  Of note procalcitonin negative and without leukocytosis    Multiplex negative  · Given low-grade fever however continuing treatment with ceftriaxone at this juncture pending serial procalcitonin's, blood cultures x2, urine Legionella/strep  · Trend WBC, temperature curve, hemodynamics

## 2023-05-27 NOTE — ASSESSMENT & PLAN NOTE
Lab Results   Component Value Date    CREATININE 1 57 (H) 05/27/2023    CREATININE 1 59 (H) 05/26/2023    CREATININE 1 33 (H) 12/16/2022    EGFR 32 05/27/2023    EGFR 31 05/26/2023    EGFR 39 12/16/2022     CKD stage III  Stable  Monitor

## 2023-05-27 NOTE — ED ATTENDING ATTESTATION
5/26/2023  INoreen DO, saw and evaluated the patient  I have discussed the patient with the resident/non-physician practitioner and agree with the resident's/non-physician practitioner's findings, Plan of Care, and MDM as documented in the resident's/non-physician practitioner's note, except where noted  All available labs and Radiology studies were reviewed  I was present for key portions of any procedure(s) performed by the resident/non-physician practitioner and I was immediately available to provide assistance  At this point I agree with the current assessment done in the Emergency Department  I have conducted an independent evaluation of this patient a history and physical is as follows:    61-year-old female presents with family for being more sleepy than normal and fever per EMS  Patient had fever and ambulance per EMS  Daughter states that patient seems to be having a decline this week  Has history of urinary tract infections  Has also had URI symptoms including a cough  Patient unable to give much history  On exam-no acute distress, heart tachycardic, no respiratory distress but does have a cough  No obvious sign of trauma  Plan- CT head, check labs, chest x-ray, straight catheter urine  Concern for possible sepsis    Likely will require admission for observation    ED Course         Critical Care Time  Procedures

## 2023-05-27 NOTE — ASSESSMENT & PLAN NOTE
Wt Readings from Last 3 Encounters:   12/16/22 120 kg (264 lb 15 9 oz)   12/08/22 120 kg (265 lb 9 6 oz)   11/25/22 122 kg (268 lb 8 oz)     · Difficult to assess volume status due to morbid obesity however family reporting some increased pedal edema  Patient unsure if she has gained weight  · We will check BNP, low threshold to consider diuresis if pneumonia work-up is negative  · Continue remainder of cardiac medications    Monitor volume status closely

## 2023-05-27 NOTE — ASSESSMENT & PLAN NOTE
· Currently in sinus rhythm    Continue with metoprolol tartrate at home dose  · Continue anticoagulation with Eliquis

## 2023-05-27 NOTE — PROGRESS NOTES
[facility]  Progress Note  Name: Milla Payne I  MRN: [id number]  Unit/Bed#: ED 21 I Date of Admission: 5/26/2023   Date of Service: 5/27/2023 I Hospital Day: 1    Assessment/Plan   * Pneumonia of right lower lobe due to infectious organism  Assessment & Plan  · Suspected, patient with 5-6-day history of increasing lethargy, cough, reported fevers and with low-grade fever here  · CXR to wet read with possible right lower lobe pneumonia however await final read  Of note procalcitonin negative and without leukocytosis  · Given low-grade fever however continuing treatment with ceftriaxone at this juncture pending serial procalcitonin's, blood cultures x2, urine  · Negative Legionella/strep  · Trend WBC, temperature curve, hemodynamics  · Speech eval  · Add bronchodilator due to wheezing    Chronic kidney disease  Assessment & Plan  Lab Results   Component Value Date    CREATININE 1 57 (H) 05/27/2023    CREATININE 1 59 (H) 05/26/2023    CREATININE 1 33 (H) 12/16/2022    EGFR 32 05/27/2023    EGFR 31 05/26/2023    EGFR 39 12/16/2022     CKD stage III  Stable  Monitor    Chronic diastolic heart failure (HCC)  Assessment & Plan  Wt Readings from Last 3 Encounters:   12/16/22 120 kg (264 lb 15 9 oz)   12/08/22 120 kg (265 lb 9 6 oz)   11/25/22 122 kg (268 lb 8 oz)     · Difficult to assess volume status due to morbid obesity however family reporting some increased pedal edema  Patient unsure if she has gained weight   · previous cardiac echo on 11/20/2022 with EF 55% and grade 2 diastolic dysfunction  · , low threshold to consider diuresis if pneumonia work-up is negative  · Continue remainder of cardiac medications  Monitor volume status closely      Paroxysmal atrial fibrillation (HCC)  Assessment & Plan  · Currently in sinus rhythm    Continue with metoprolol tartrate at home dose  · Continue anticoagulation with Eliquis    Lumbar radiculopathy  Assessment & Plan  · Symptoms stable per patient, continue home dose gabapentin    Morbid obesity ([address] )  Assessment & Plan  · Dietary and lifestyle modification advised  Morbid obesity complicates all facets of care    Rheumatoid arthritis (HCC)  Assessment & Plan  Continue Plaquenil and prednisone            VTE Pharmacologic Prophylaxis: VTE Score: 4 Moderate Risk (Score 3-4) - Pharmacological DVT Prophylaxis Ordered: apixaban (Eliquis)  Patient Centered Rounds: I performed bedside rounds with nursing staff today  Discussions with Specialists or Other Care Team Provider:     Education and Discussions with Family / Patient: Updated  (daughter) at bedside  Total Time Spent on Date of Encounter in care of patient: 55 minutes This time was spent on one or more of the following: performing physical exam; counseling and coordination of care; obtaining or reviewing history; documenting in the medical record; reviewing/ordering tests, medications or procedures; communicating with other healthcare professionals and discussing with patient's family/caregivers  Current Length of Stay: 1 day(s)  Current Patient Status: Inpatient   Certification Statement: The patient will continue to require additional inpatient hospital stay due to Management of pneumonia  Discharge Plan: Anticipate discharge in 48-72 hrs to rehab facility  Code Status: Level 1 - Full Code    Subjective:   Patient seen and examined  Comfortable eating in bed with active coughing  Family at bedside    Objective:     Vitals:   Temp (24hrs), Av 3 °F (37 9 °C), Min:100 3 °F (37 9 °C), Max:100 3 °F (37 9 °C)    Temp:  [100 3 °F (37 9 °C)] 100 3 °F (37 9 °C)  HR:  [] 100  Resp:  [20-23] 23  BP: (109-136)/(57-62) 109/59  SpO2:  [94 %-95 %] 95 %  There is no height or weight on file to calculate BMI  Input and Output Summary (last 24 hours):      Intake/Output Summary (Last 24 hours) at 2023 0730  Last data filed at 2023 0043  Gross per 24 hour   Intake 166 7 ml   Output --   Net 166 7 ml       Physical Exam:   Physical Exam   Patient is awake alert in no acute distress  Actively coughing while eating  Morbidly obese  Lung with decreased sound bilateral and expiratory wheezing  Heart positive S1-S2 no murmur  Abdomen soft nontender  Lower extremities no edema  Skin with multiple maculopapular rash with overlying plaques    Additional Data:     Labs:  Results from last 7 days   Lab Units 05/27/23  0512 05/26/23 2109   EOS PCT %  --  2   HEMATOCRIT % 35 2 37 3   HEMOGLOBIN g/dL 10 9* 11 5   LYMPHS PCT %  --  9*   MONOS PCT %  --  10   NEUTROS PCT %  --  79*   PLATELETS Thousands/uL 195 203   WBC Thousand/uL 6 78 8 30     Results from last 7 days   Lab Units 05/27/23 0512 05/26/23 2109   ANION GAP mmol/L 3* 5   ALBUMIN g/dL  --  2 6*   ALK PHOS U/L  --  76   ALT U/L  --  55   AST U/L  --  54*   BUN mg/dL 26* 25   CALCIUM mg/dL 8 9 9 2   CHLORIDE mmol/L 108 106   CO2 mmol/L 29 26   CREATININE mg/dL 1 57* 1 59*   GLUCOSE RANDOM mg/dL 73 86   POTASSIUM mmol/L 4 1 4 2   SODIUM mmol/L 140 137   TOTAL BILIRUBIN mg/dL  --  0 62     Results from last 7 days   Lab Units 05/26/23 2109   INR  1 88*             Results from last 7 days   Lab Units 05/27/23 0512 05/26/23 2109   LACTIC ACID mmol/L  --  1 6   PROCALCITONIN ng/ml 0 15 0 16       Lines/Drains:  Invasive Devices     Peripheral Intravenous Line  Duration           Peripheral IV 05/26/23 Right Antecubital <1 day          Drain  Duration           External Urinary Catheter -- days    External Urinary Catheter 161 days                      Imaging: Reviewed  Recent Cultures (last 7 days):   Results from last 7 days   Lab Units 05/27/23 0042 05/26/23 2109   BLOOD CULTURE   --  Received in Microbiology Lab  Culture in Progress  Received in Microbiology Lab  Culture in Progress     LEGIONELLA URINARY ANTIGEN  Negative  --        Last 24 Hours Medication List:   Current Facility-Administered Medications   Medication Dose Route Frequency Provider Last Rate   • acetaminophen  650 mg Oral Q4H PRN Ba Levy, DO     • albuterol  2 puff Inhalation Q6H PRN Ba Levy, DO     • apixaban  5 mg Oral BID Ba Levy, DO     • cefTRIAXone  2,000 mg Intravenous Q24H Ba Levy, DO     • cholecalciferol  1,000 Units Oral Daily Ba Levy, DO     • gabapentin  100 mg Oral Daily Ba Levy, DO     • gabapentin  300 mg Oral HS Ba Levy, DO     • hydrocortisone   Topical 4x Daily PRN Ba Levy, DO     • hydroxychloroquine  200 mg Oral BID Ba Levy, DO     • ipratropium  0 5 mg Nebulization TID Kayla Hook, DO     • levalbuterol  0 63 mg Nebulization TID Kayla Hook, DO     • lidocaine  2 patch Topical Daily Ba Levy, DO     • metoprolol tartrate  25 mg Oral Q12H [facility] Ba Levy, DO     • nystatin   Topical BID Ba Levy DO     • ondansetron  4 mg Intravenous Q6H PRN Ba Levy DO     • pantoprazole  40 mg Oral Early Morning Ba Levy DO     • potassium chloride  10 mEq Oral Daily Ba Levy DO     • predniSONE  5 mg Oral BID With Meals Ba Levy, DO          Today, Patient Was Seen By: Kayla Hook DO    **Please Note: This note may have been constructed using a voice recognition system  **

## 2023-05-27 NOTE — ASSESSMENT & PLAN NOTE
· Suspected, patient with 5-6-day history of increasing lethargy, cough, reported fevers and with low-grade fever here  · CXR to wet read with possible right lower lobe pneumonia however await final read  Of note procalcitonin negative and without leukocytosis      · Given low-grade fever however continuing treatment with ceftriaxone at this juncture pending serial procalcitonin's, blood cultures x2, urine  · Negative Legionella/strep  · Trend WBC, temperature curve, hemodynamics  · Speech eval  · Add bronchodilator due to wheezing

## 2023-05-27 NOTE — ASSESSMENT & PLAN NOTE
Wt Readings from Last 3 Encounters:   12/16/22 120 kg (264 lb 15 9 oz)   12/08/22 120 kg (265 lb 9 6 oz)   11/25/22 122 kg (268 lb 8 oz)     · Difficult to assess volume status due to morbid obesity however family reporting some increased pedal edema  Patient unsure if she has gained weight   · previous cardiac echo on 11/20/2022 with EF 55% and grade 2 diastolic dysfunction  · , low threshold to consider diuresis if pneumonia work-up is negative  · Continue remainder of cardiac medications    Monitor volume status closely

## 2023-05-28 PROBLEM — R50.9 FEBRILE ILLNESS: Status: ACTIVE | Noted: 2023-05-26

## 2023-05-28 LAB
ANION GAP SERPL CALCULATED.3IONS-SCNC: 2 MMOL/L (ref 4–13)
ATRIAL RATE: 89 BPM
BASOPHILS # BLD AUTO: 0.02 THOUSANDS/ÂΜL (ref 0–0.1)
BASOPHILS NFR BLD AUTO: 0 % (ref 0–1)
BUN SERPL-MCNC: 24 MG/DL (ref 5–25)
CALCIUM SERPL-MCNC: 9.1 MG/DL (ref 8.3–10.1)
CHLORIDE SERPL-SCNC: 108 MMOL/L (ref 96–108)
CO2 SERPL-SCNC: 28 MMOL/L (ref 21–32)
CREAT SERPL-MCNC: 1.5 MG/DL (ref 0.6–1.3)
EOSINOPHIL # BLD AUTO: 0.29 THOUSAND/ÂΜL (ref 0–0.61)
EOSINOPHIL NFR BLD AUTO: 4 % (ref 0–6)
ERYTHROCYTE [DISTWIDTH] IN BLOOD BY AUTOMATED COUNT: 13.6 % (ref 11.6–15.1)
GFR SERPL CREATININE-BSD FRML MDRD: 34 ML/MIN/1.73SQ M
GLUCOSE SERPL-MCNC: 100 MG/DL (ref 65–140)
HCT VFR BLD AUTO: 35.5 % (ref 34.8–46.1)
HGB BLD-MCNC: 11.2 G/DL (ref 11.5–15.4)
IMM GRANULOCYTES # BLD AUTO: 0.06 THOUSAND/UL (ref 0–0.2)
IMM GRANULOCYTES NFR BLD AUTO: 1 % (ref 0–2)
LYMPHOCYTES # BLD AUTO: 0.96 THOUSANDS/ÂΜL (ref 0.6–4.47)
LYMPHOCYTES NFR BLD AUTO: 12 % (ref 14–44)
MCH RBC QN AUTO: 29 PG (ref 26.8–34.3)
MCHC RBC AUTO-ENTMCNC: 31.5 G/DL (ref 31.4–37.4)
MCV RBC AUTO: 92 FL (ref 82–98)
MONOCYTES # BLD AUTO: 0.76 THOUSAND/ÂΜL (ref 0.17–1.22)
MONOCYTES NFR BLD AUTO: 10 % (ref 4–12)
MRSA NOSE QL CULT: NORMAL
NEUTROPHILS # BLD AUTO: 5.83 THOUSANDS/ÂΜL (ref 1.85–7.62)
NEUTS SEG NFR BLD AUTO: 73 % (ref 43–75)
NRBC BLD AUTO-RTO: 0 /100 WBCS
P AXIS: 27 DEGREES
PLATELET # BLD AUTO: 199 THOUSANDS/UL (ref 149–390)
PMV BLD AUTO: 11.3 FL (ref 8.9–12.7)
POTASSIUM SERPL-SCNC: 3.9 MMOL/L (ref 3.5–5.3)
QRS AXIS: 43 DEGREES
QRSD INTERVAL: 98 MS
QT INTERVAL: 360 MS
QTC INTERVAL: 442 MS
RBC # BLD AUTO: 3.86 MILLION/UL (ref 3.81–5.12)
SODIUM SERPL-SCNC: 138 MMOL/L (ref 135–147)
T WAVE AXIS: -5 DEGREES
VENTRICULAR RATE: 91 BPM
WBC # BLD AUTO: 7.92 THOUSAND/UL (ref 4.31–10.16)

## 2023-05-28 PROCEDURE — 99233 SBSQ HOSP IP/OBS HIGH 50: CPT | Performed by: INTERNAL MEDICINE

## 2023-05-28 PROCEDURE — 80048 BASIC METABOLIC PNL TOTAL CA: CPT | Performed by: INTERNAL MEDICINE

## 2023-05-28 PROCEDURE — 94664 DEMO&/EVAL PT USE INHALER: CPT

## 2023-05-28 PROCEDURE — 85025 COMPLETE CBC W/AUTO DIFF WBC: CPT | Performed by: INTERNAL MEDICINE

## 2023-05-28 PROCEDURE — 93010 ELECTROCARDIOGRAM REPORT: CPT | Performed by: INTERNAL MEDICINE

## 2023-05-28 RX ORDER — FUROSEMIDE 40 MG/1
40 TABLET ORAL DAILY
Status: DISCONTINUED | OUTPATIENT
Start: 2023-05-28 | End: 2023-06-01 | Stop reason: HOSPADM

## 2023-05-28 RX ORDER — GUAIFENESIN 600 MG/1
600 TABLET, EXTENDED RELEASE ORAL EVERY 12 HOURS SCHEDULED
Status: DISCONTINUED | OUTPATIENT
Start: 2023-05-28 | End: 2023-06-01 | Stop reason: HOSPADM

## 2023-05-28 RX ORDER — AMOXICILLIN 250 MG
1 CAPSULE ORAL 2 TIMES DAILY
Status: DISCONTINUED | OUTPATIENT
Start: 2023-05-28 | End: 2023-06-01 | Stop reason: HOSPADM

## 2023-05-28 RX ORDER — GUAIFENESIN 100 MG/5ML
200 SOLUTION ORAL EVERY 4 HOURS PRN
Status: DISCONTINUED | OUTPATIENT
Start: 2023-05-28 | End: 2023-06-01 | Stop reason: HOSPADM

## 2023-05-28 RX ADMIN — GUAIFENESIN 600 MG: 600 TABLET, EXTENDED RELEASE ORAL at 21:10

## 2023-05-28 RX ADMIN — SENNOSIDES AND DOCUSATE SODIUM 1 TABLET: 50; 8.6 TABLET ORAL at 09:13

## 2023-05-28 RX ADMIN — METOPROLOL TARTRATE 25 MG: 25 TABLET, FILM COATED ORAL at 09:14

## 2023-05-28 RX ADMIN — GABAPENTIN 300 MG: 300 CAPSULE ORAL at 21:10

## 2023-05-28 RX ADMIN — ALBUTEROL SULFATE 2 PUFF: 90 AEROSOL, METERED RESPIRATORY (INHALATION) at 01:01

## 2023-05-28 RX ADMIN — APIXABAN 5 MG: 5 TABLET, FILM COATED ORAL at 09:13

## 2023-05-28 RX ADMIN — GUAIFENESIN 200 MG: 100 SOLUTION ORAL at 05:44

## 2023-05-28 RX ADMIN — HYDROXYCHLOROQUINE SULFATE 200 MG: 200 TABLET ORAL at 18:44

## 2023-05-28 RX ADMIN — LIDOCAINE 5% 2 PATCH: 700 PATCH TOPICAL at 09:17

## 2023-05-28 RX ADMIN — PREDNISONE 5 MG: 5 TABLET ORAL at 09:17

## 2023-05-28 RX ADMIN — ACETAMINOPHEN 650 MG: 325 TABLET ORAL at 16:15

## 2023-05-28 RX ADMIN — Medication 1000 UNITS: at 09:14

## 2023-05-28 RX ADMIN — NYSTATIN: 100000 POWDER TOPICAL at 09:19

## 2023-05-28 RX ADMIN — GABAPENTIN 100 MG: 100 CAPSULE ORAL at 09:13

## 2023-05-28 RX ADMIN — HYDROXYCHLOROQUINE SULFATE 200 MG: 200 TABLET ORAL at 09:19

## 2023-05-28 RX ADMIN — SODIUM CHLORIDE 500 ML: 0.9 INJECTION, SOLUTION INTRAVENOUS at 19:57

## 2023-05-28 RX ADMIN — SENNOSIDES AND DOCUSATE SODIUM 1 TABLET: 50; 8.6 TABLET ORAL at 18:43

## 2023-05-28 RX ADMIN — APIXABAN 5 MG: 5 TABLET, FILM COATED ORAL at 18:43

## 2023-05-28 RX ADMIN — GUAIFENESIN 600 MG: 600 TABLET, EXTENDED RELEASE ORAL at 09:14

## 2023-05-28 RX ADMIN — POTASSIUM CHLORIDE 10 MEQ: 750 TABLET, EXTENDED RELEASE ORAL at 09:14

## 2023-05-28 RX ADMIN — METOPROLOL TARTRATE 25 MG: 25 TABLET, FILM COATED ORAL at 21:10

## 2023-05-28 RX ADMIN — GUAIFENESIN 200 MG: 100 SOLUTION ORAL at 01:29

## 2023-05-28 RX ADMIN — PREDNISONE 5 MG: 5 TABLET ORAL at 18:43

## 2023-05-28 RX ADMIN — PANTOPRAZOLE SODIUM 40 MG: 40 TABLET, DELAYED RELEASE ORAL at 05:44

## 2023-05-28 RX ADMIN — NYSTATIN: 100000 POWDER TOPICAL at 18:45

## 2023-05-28 RX ADMIN — FUROSEMIDE 40 MG: 40 TABLET ORAL at 09:14

## 2023-05-28 NOTE — PROGRESS NOTES
[facility]  Progress Note  Name: Radha Snider I  MRN: [id number]  Unit/Bed#: -80 I Date of Admission: 5/26/2023   Date of Service: 5/28/2023 I Hospital Day: 2    Assessment/Plan   * Febrile illness  Assessment & Plan  · Patient with 5-6-day history of increasing lethargy, cough, reported fevers and with low-grade fever here  · CXR without acute abnormality   · No leukocytosis with negative procalcitonin x2  · Negative COVID  · Negative UA  · Negative Legionella/strep  ·   · Suspect viral illness with reactive airway disease and bronchospasm  · Cleared by speech for regular with thin  · Discontinue IV antibiotic and observe  · Follow on blood culture    Chronic kidney disease  Assessment & Plan  Lab Results   Component Value Date    CREATININE 1 50 (H) 05/28/2023    CREATININE 1 57 (H) 05/27/2023    CREATININE 1 59 (H) 05/26/2023    EGFR 34 05/28/2023    EGFR 32 05/27/2023    EGFR 31 05/26/2023     CKD stage III  Stable  Monitor    Chronic diastolic heart failure (HCC)  Assessment & Plan  Wt Readings from Last 3 Encounters:   05/28/23 118 kg (261 lb)   12/16/22 120 kg (264 lb 15 9 oz)   12/08/22 120 kg (265 lb 9 6 oz)     · Difficult to assess volume status due to morbid obesity however family reporting some increased pedal edema  Patient unsure if she has gained weight   · previous cardiac echo on 11/20/2022 with EF 55% and grade 2 diastolic dysfunction  ·   · Restart oral Lasix  · Monitor      Paroxysmal atrial fibrillation (HCC)  Assessment & Plan  · Currently in sinus rhythm  Continue with metoprolol tartrate at home dose  · Continue anticoagulation with Eliquis    Lumbar radiculopathy  Assessment & Plan  · Symptoms stable per patient, continue home dose gabapentin    Morbid obesity ([address] )  Assessment & Plan  · Dietary and lifestyle modification advised    Morbid obesity complicates all facets of care    Rheumatoid arthritis [facility])  Assessment & Plan  Stable  Continue Plaquenil and prednisone            VTE Pharmacologic Prophylaxis: VTE Score: 4 Moderate Risk (Score 3-4) - Pharmacological DVT Prophylaxis Ordered: apixaban (Eliquis)  Patient Centered Rounds: I performed bedside rounds with nursing staff today  Discussions with Specialists or Other Care Team Provider:     Education and Discussions with Family / Patient: patient  Total Time Spent on Date of Encounter in care of patient: 55 minutes This time was spent on one or more of the following: performing physical exam; counseling and coordination of care; obtaining or reviewing history; documenting in the medical record; reviewing/ordering tests, medications or procedures; communicating with other healthcare professionals and discussing with patient's family/caregivers  Current Length of Stay: 2 day(s)  Current Patient Status: Inpatient   Certification Statement: The patient will continue to require additional inpatient hospital stay due to Management of viral illness  Discharge Plan: Anticipate discharge in 24-48 hrs to rehab facility  Code Status: Level 1 - Full Code    Subjective:   Patient seen and examined  Comfortable in bed  No event overnight  Clinically improving  Still with cough and low-grade fever    Objective:     Vitals:   Temp (24hrs), Av 5 °F (37 5 °C), Min:98 9 °F (37 2 °C), Max:100 7 °F (38 2 °C)    Temp:  [98 9 °F (37 2 °C)-100 7 °F (38 2 °C)] 98 9 °F (37 2 °C)  HR:  [] 97  Resp:  [15-23] 15  BP: (109-160)/(59-79) 144/75  SpO2:  [95 %-100 %] 97 %  Body mass index is 46 23 kg/m²  Input and Output Summary (last 24 hours):      Intake/Output Summary (Last 24 hours) at 2023 0817  Last data filed at 2023 1900  Gross per 24 hour   Intake 480 ml   Output --   Net 480 ml       Physical Exam:   Physical Exam   Patient is awake alert in no acute distress  Morbidly obese  Lungs clear to auscultation bilateral  Heart positive S1-S2 no murmur  Abdomen soft nontender  Lower extremities no edema  Skin with diffuse psoriatic lesions    Additional Data:     Labs:  Results from last 7 days   Lab Units 05/28/23  0552   EOS PCT % 4   HEMATOCRIT % 35 5   HEMOGLOBIN g/dL 11 2*   LYMPHS PCT % 12*   MONOS PCT % 10   NEUTROS PCT % 73   PLATELETS Thousands/uL 199   WBC Thousand/uL 7 92     Results from last 7 days   Lab Units 05/28/23  0552 05/27/23  0512 05/26/23 2109   ANION GAP mmol/L 2*   < > 5   ALBUMIN g/dL  --   --  2 6*   ALK PHOS U/L  --   --  76   ALT U/L  --   --  55   AST U/L  --   --  54*   BUN mg/dL 24   < > 25   CALCIUM mg/dL 9 1   < > 9 2   CHLORIDE mmol/L 108   < > 106   CO2 mmol/L 28   < > 26   CREATININE mg/dL 1 50*   < > 1 59*   GLUCOSE RANDOM mg/dL 100   < > 86   POTASSIUM mmol/L 3 9   < > 4 2   SODIUM mmol/L 138   < > 137   TOTAL BILIRUBIN mg/dL  --   --  0 62    < > = values in this interval not displayed  Results from last 7 days   Lab Units 05/26/23  2109   INR  1 88*             Results from last 7 days   Lab Units 05/27/23  0512 05/26/23 2109   LACTIC ACID mmol/L  --  1 6   PROCALCITONIN ng/ml 0 15 0 16       Lines/Drains:  Invasive Devices     Peripheral Intravenous Line  Duration           Peripheral IV 05/26/23 Right Antecubital 1 day          Drain  Duration           External Urinary Catheter <1 day                      Imaging: Reviewed    Recent Cultures (last 7 days):   Results from last 7 days   Lab Units 05/27/23  0042 05/26/23 2109   BLOOD CULTURE   --  No Growth at 24 hrs  No Growth at 24 hrs     LEGIONELLA URINARY ANTIGEN  Negative  --        Last 24 Hours Medication List:   Current Facility-Administered Medications   Medication Dose Route Frequency Provider Last Rate   • acetaminophen  650 mg Oral Q4H PRN Gray Labrum, DO     • albuterol  2 puff Inhalation Q6H PRN Gray Labrum, DO     • albuterol  2 5 mg Nebulization Q4H PRN Gray Labrum, DO     • apixaban  5 mg Oral BID Gray Labrum, DO     • cholecalciferol  1,000 Units Oral Daily Rodger Parr, DO     • furosemide  40 mg Oral Daily Cosimo Saliva, DO     • gabapentin  100 mg Oral Daily [facility] Karolyn, DO     • gabapentin  300 mg Oral HS Rodger Parr, DO     • guaiFENesin  600 mg Oral Q12H [facility] Cosimo Saliva, DO     • guaiFENesin  200 mg Oral Q4H PRN Annie Katz PA-C     • hydrocortisone   Topical 4x Daily PRN [facility] Parr, DO     • hydroxychloroquine  200 mg Oral BID Rodger Parr, DO     • lidocaine  2 patch Topical Daily [facility] Karolyn, DO     • metoprolol tartrate  25 mg Oral Q12H [facility] Karolyn, DO     • nystatin   Topical BID [facility] Karolyn, DO     • ondansetron  4 mg Intravenous Q6H PRN Rodger Parr, DO     • pantoprazole  40 mg Oral Early Morning Rodger Parr, DO     • potassium chloride  10 mEq Oral Daily Rodger Parr, DO     • predniSONE  5 mg Oral BID With Meals Rodger Parr, DO     • senna-docusate sodium  1 tablet Oral BID Jessica Quiros, DO          Today, Patient Was Seen By: Jessica Quiros, DO    **Please Note: This note may have been constructed using a voice recognition system  **

## 2023-05-28 NOTE — PHYSICAL THERAPY NOTE
Physical Therapy Screen    Patient Name: Norma Mckeon    FSUNE'T Date: 2023     Problem List  Principal Problem:    Febrile illness  Active Problems:    Rheumatoid arthritis ([facility] )    Morbid obesity ([facility] )    Lumbar radiculopathy    Paroxysmal atrial fibrillation (HCC)    Chronic diastolic heart failure ([facility] )    Chronic kidney disease       Past Medical History  Past Medical History:   Diagnosis Date    Abnormal thyroid function test     last assessed: 2015     Arthritis     Caries     last assessed: 2016     Continuous opioid dependence ([facility] ) 2021    Edema of right lower extremity     last assessed: 2015     GERD (gastroesophageal reflux disease)     Hypertension     Medicare annual wellness visit, subsequent 2021    Positive blood culture 3/11/2022    Sarcoid         Past Surgical History  Past Surgical History:   Procedure Laterality Date     SECTION      MULTIPLE TOOTH EXTRACTIONS N/A 2016    Procedure: Surgical extraction of teeth 2, 18, 19, 30, 31; incision and drainage of left subperiosteal abscess ;  Surgeon: Lili Florence DMD;  Location: [facility];  Service:         Pt admitted from facility ([place]) where it is reported she receives A for all self care + ADL's and is bed/ w/c bound and reports to xfer w/ vamshi lift or Ax2  Pt is functioning at baseline/ no skilled PT in acute setting indicated at this time   Recommend return to facility on d/c   PT to sign off at this time

## 2023-05-28 NOTE — ASSESSMENT & PLAN NOTE
Wt Readings from Last 3 Encounters:   05/28/23 118 kg (261 lb)   12/16/22 120 kg (264 lb 15 9 oz)   12/08/22 120 kg (265 lb 9 6 oz)     · Difficult to assess volume status due to morbid obesity however family reporting some increased pedal edema    Patient unsure if she has gained weight   · previous cardiac echo on 11/20/2022 with EF 55% and grade 2 diastolic dysfunction  ·   · Restart oral Lasix  · Monitor

## 2023-05-28 NOTE — RESPIRATORY THERAPY NOTE
"RT Protocol Note  Gab Russ 74 y o  female MRN: [id number]  Unit/Bed#: -01 Encounter: [id number]    Assessment    Principal Problem:    Pneumonia of right lower lobe due to infectious organism  Active Problems:    Rheumatoid arthritis ([facility] )    Morbid obesity ([facility] )    Lumbar radiculopathy    Paroxysmal atrial fibrillation (HCC)    Chronic diastolic heart failure ([facility] )    Chronic kidney disease      Home Pulmonary Medications:  Albuterol udn prn       Past Medical History:   Diagnosis Date    Abnormal thyroid function test     last assessed: Sept 25, 2015     Arthritis     Caries     last assessed: Sept 9, 2016     Continuous opioid dependence ([facility] ) 12/2/2021    Edema of right lower extremity     last assessed: March 18, 2015     GERD (gastroesophageal reflux disease)     Hypertension     Medicare annual wellness visit, subsequent 12/2/2021    Positive blood culture 3/11/2022    Sarcoid      Social History     Socioeconomic History    Marital status: Single     Spouse name: None    Number of children: None    Years of education: None    Highest education level: None   Occupational History    Occupation: Retired: Worked for [employer]    Tobacco Use    Smoking status: Never    Smokeless tobacco: Never   Vaping Use    Vaping Use: Never used   Substance and Sexual Activity    Alcohol use: Not Currently    Drug use: No    Sexual activity: Not Currently   Other Topics Concern    None   Social History Narrative     noted in \"allscripts\"      Social Determinants of Health     Financial Resource Strain: Not on file   Food Insecurity: No [address] (12/14/2022)    Hunger Vital Sign     Worried About Running Out of Food in the Last Year: Never true     Ran Out of Food in the Last Year: Never true   Transportation Needs: No Transportation Needs (12/14/2022)    PRAPARE - Transportation     Lack of Transportation (Medical): No     Lack of Transportation (Non-Medical):  No   Physical Activity: Not on file " Stress: Not on file   Social Connections: Not on file   Intimate Partner Violence: Not on file   Housing Stability: Low Risk  (12/14/2022)    Housing Stability Vital Sign     Unable to Pay for Housing in the Last Year: No     Number of Places Lived in the Last Year: 1     Unstable Housing in the Last Year: No       Subjective         Objective    Physical Exam:   Assessment Type: (P) Assess only  General Appearance: (P) Awake  Respiratory Pattern: (P) Normal  Chest Assessment: (P) Chest expansion symmetrical  Bilateral Breath Sounds: (P) Diminished    Vitals:  Blood pressure 132/66, pulse 96, temperature 99 2 °F (37 3 °C), temperature source Oral, resp  rate 18, SpO2 98 %  Imaging and other studies: I have personally reviewed pertinent reports  Plan    Respiratory Plan: (P) Home Bronchodilator Patient pathway        Resp Comments: (P) pt is a 73 y/o female admitted with pneumonia  Pt uses albuterol at home prn  Breathe sopunds are diminished and clear  Will start pt on udns prn at this time

## 2023-05-28 NOTE — PLAN OF CARE
Problem: MOBILITY - ADULT  Goal: Maintain or return to baseline ADL function  Description: INTERVENTIONS:  -  Assess patient's ability to carry out ADLs; assess patient's baseline for ADL function and identify physical deficits which impact ability to perform ADLs (bathing, care of mouth/teeth, toileting, grooming, dressing, etc )  - Assess/evaluate cause of self-care deficits   - Assess range of motion  - Assess patient's mobility; develop plan if impaired  - Assess patient's need for assistive devices and provide as appropriate  - Encourage maximum independence but intervene and supervise when necessary  - Involve family in performance of ADLs  - Assess for home care needs following discharge   - Consider OT consult to assist with ADL evaluation and planning for discharge  - Provide patient education as appropriate  Outcome: Progressing  Goal: Maintains/Returns to pre admission functional level  Description: INTERVENTIONS:  - Perform BMAT or MOVE assessment daily    - Set and communicate daily mobility goal to care team and patient/family/caregiver  - Collaborate with rehabilitation services on mobility goals if consulted  - Perform Range of Motion 4 times a day  - Reposition patient every 2 hours    - Dangle patient 3 times a day  - Stand patient 3 times a day  - Ambulate patient 3 times a day  - Out of bed to chair 3 times a day   - Out of bed for meals 3 times a day  - Out of bed for toileting  - Record patient progress and toleration of activity level   Outcome: Progressing     Problem: NEUROSENSORY - ADULT  Goal: Achieves stable or improved neurological status  Description: INTERVENTIONS  - Monitor and report changes in neurological status  - Monitor vital signs such as temperature, blood pressure, glucose, and any other labs ordered   - Initiate measures to prevent increased intracranial pressure  - Monitor for seizure activity and implement precautions if appropriate      Outcome: Progressing Problem: RESPIRATORY - ADULT  Goal: Achieves optimal ventilation and oxygenation  Description: INTERVENTIONS:  - Assess for changes in respiratory status  - Assess for changes in mentation and behavior  - Position to facilitate oxygenation and minimize respiratory effort  - Oxygen administered by appropriate delivery if ordered  - Initiate smoking cessation education as indicated  - Encourage broncho-pulmonary hygiene including cough, deep breathe, Incentive Spirometry  - Assess the need for suctioning and aspirate as needed  - Assess and instruct to report SOB or any respiratory difficulty  - Respiratory Therapy support as indicated  Outcome: Progressing     Problem: GENITOURINARY - ADULT  Goal: Absence of urinary retention  Description: INTERVENTIONS:  - Assess patient’s ability to void and empty bladder  - Monitor I/O  - Bladder scan as needed  - Discuss with physician/AP medications to alleviate retention as needed  - Discuss catheterization for long term situations as appropriate  Outcome: Progressing     Problem: MUSCULOSKELETAL - ADULT  Goal: Maintain or return mobility to safest level of function  Description: INTERVENTIONS:  - Assess patient's ability to carry out ADLs; assess patient's baseline for ADL function and identify physical deficits which impact ability to perform ADLs (bathing, care of mouth/teeth, toileting, grooming, dressing, etc )  - Assess/evaluate cause of self-care deficits   - Assess range of motion  - Assess patient's mobility  - Assess patient's need for assistive devices and provide as appropriate  - Encourage maximum independence but intervene and supervise when necessary  - Involve family in performance of ADLs  - Assess for home care needs following discharge   - Consider OT consult to assist with ADL evaluation and planning for discharge  - Provide patient education as appropriate  Outcome: Progressing     Problem: PAIN - ADULT  Goal: Verbalizes/displays adequate comfort level or baseline comfort level  Description: Interventions:  - Encourage patient to monitor pain and request assistance  - Assess pain using appropriate pain scale  - Administer analgesics based on type and severity of pain and evaluate response  - Implement non-pharmacological measures as appropriate and evaluate response  - Consider cultural and social influences on pain and pain management  - Notify physician/advanced practitioner if interventions unsuccessful or patient reports new pain  Outcome: Progressing     Problem: SAFETY ADULT  Goal: Patient will remain free of falls  Description: INTERVENTIONS:  - Educate patient/family on patient safety including physical limitations  - Instruct patient to call for assistance with activity   - Consult OT/PT to assist with strengthening/mobility   - Keep Call bell within reach  - Keep bed low and locked with side rails adjusted as appropriate  - Keep care items and personal belongings within reach  - Initiate and maintain comfort rounds  - Make Fall Risk Sign visible to staff  - Offer Toileting every 2 Hours, in advance of need  - Initiate/Maintain bed alarm  - Obtain necessary fall risk management equipment: alarm  - Apply yellow socks and bracelet for high fall risk patients  - Consider moving patient to room near nurses station  Outcome: Progressing     Problem: METABOLIC, FLUID AND ELECTROLYTES - ADULT  Goal: Electrolytes maintained within normal limits  Description: INTERVENTIONS:  - Monitor labs and assess patient for signs and symptoms of electrolyte imbalances  - Administer electrolyte replacement as ordered  - Monitor response to electrolyte replacements, including repeat lab results as appropriate  - Instruct patient on fluid and nutrition as appropriate  Outcome: Progressing  Goal: Fluid balance maintained  Description: INTERVENTIONS:  - Monitor labs   - Monitor I/O and WT  - Instruct patient on fluid and nutrition as appropriate  - Assess for signs & symptoms of volume excess or deficit  Outcome: Progressing     Problem: SKIN/TISSUE INTEGRITY - ADULT  Goal: Skin Integrity remains intact(Skin Breakdown Prevention)  Description: Assess:  -Perform Gagan assessment every day  -Clean and moisturize skin every day  -Inspect skin when repositioning, toileting, and assisting with ADLS  -Assess under medical devices such as purewick every 8 hrs  -Assess extremities for adequate circulation and sensation     Bed Management:  -Have minimal linens on bed & keep smooth, unwrinkled  -Change linens as needed when moist or perspiring  -Avoid sitting or lying in one position for more than 2 hours while in bed  -Keep HOB at 30 degrees     Toileting:  -Offer bedside commode  -Assess for incontinence every 2 hrs  -Use incontinent care products after each incontinent episode such as cleansing foam    Activity:  -Mobilize patient 3 times a day  -Encourage activity and walks on unit  -Encourage or provide ROM exercises   -Turn and reposition patient every 2 Hours  -Use appropriate equipment to lift or move patient in bed  -Instruct/ Assist with weight shifting every 2 hrs when out of bed in chair  -Consider limitation of chair time 2 hour intervals    Skin Care:  -Avoid use of baby powder, tape, friction and shearing, hot water or constrictive clothing    Outcome: Progressing

## 2023-05-28 NOTE — QUICK NOTE
"Informed by RN patient's daughter is at bedside and is requesting an update now at bedside    Met daughter at bedside, reviewed labs, clinical notes, medication list with her at bedside    She is requesting IV fluid hydration, \"My mom will perk up with IV fluids\"    Will provide gentle IV fluid hydration    Discussed with RN at bedside    Ayana"

## 2023-05-28 NOTE — ASSESSMENT & PLAN NOTE
· Patient with 5-6-day history of increasing lethargy, cough, reported fevers and with low-grade fever here  · CXR without acute abnormality   · No leukocytosis with negative procalcitonin x2  · Negative COVID  · Negative UA  · Negative Legionella/strep  ·   · Suspect viral illness with reactive airway disease and bronchospasm  · Cleared by speech for regular with thin  · Discontinue IV antibiotic and observe  · Follow on blood culture

## 2023-05-28 NOTE — ASSESSMENT & PLAN NOTE
Lab Results   Component Value Date    CREATININE 1 50 (H) 05/28/2023    CREATININE 1 57 (H) 05/27/2023    CREATININE 1 59 (H) 05/26/2023    EGFR 34 05/28/2023    EGFR 32 05/27/2023    EGFR 31 05/26/2023     CKD stage III  Stable  Monitor

## 2023-05-29 PROBLEM — J06.9 URTI (ACUTE UPPER RESPIRATORY INFECTION): Status: ACTIVE | Noted: 2023-05-29

## 2023-05-29 PROCEDURE — 94760 N-INVAS EAR/PLS OXIMETRY 1: CPT

## 2023-05-29 PROCEDURE — 99232 SBSQ HOSP IP/OBS MODERATE 35: CPT | Performed by: PHYSICIAN ASSISTANT

## 2023-05-29 RX ADMIN — APIXABAN 5 MG: 5 TABLET, FILM COATED ORAL at 17:23

## 2023-05-29 RX ADMIN — GUAIFENESIN 600 MG: 600 TABLET, EXTENDED RELEASE ORAL at 08:36

## 2023-05-29 RX ADMIN — METOPROLOL TARTRATE 25 MG: 25 TABLET, FILM COATED ORAL at 21:22

## 2023-05-29 RX ADMIN — APIXABAN 5 MG: 5 TABLET, FILM COATED ORAL at 08:37

## 2023-05-29 RX ADMIN — LIDOCAINE 5% 2 PATCH: 700 PATCH TOPICAL at 08:34

## 2023-05-29 RX ADMIN — NYSTATIN: 100000 POWDER TOPICAL at 08:37

## 2023-05-29 RX ADMIN — Medication 1000 UNITS: at 08:35

## 2023-05-29 RX ADMIN — HYDROXYCHLOROQUINE SULFATE 200 MG: 200 TABLET ORAL at 08:38

## 2023-05-29 RX ADMIN — POTASSIUM CHLORIDE 10 MEQ: 750 TABLET, EXTENDED RELEASE ORAL at 08:35

## 2023-05-29 RX ADMIN — NYSTATIN: 100000 POWDER TOPICAL at 17:26

## 2023-05-29 RX ADMIN — GABAPENTIN 100 MG: 100 CAPSULE ORAL at 08:35

## 2023-05-29 RX ADMIN — GABAPENTIN 300 MG: 300 CAPSULE ORAL at 21:26

## 2023-05-29 RX ADMIN — PANTOPRAZOLE SODIUM 40 MG: 40 TABLET, DELAYED RELEASE ORAL at 05:04

## 2023-05-29 RX ADMIN — PREDNISONE 5 MG: 5 TABLET ORAL at 17:23

## 2023-05-29 RX ADMIN — METOPROLOL TARTRATE 25 MG: 25 TABLET, FILM COATED ORAL at 08:36

## 2023-05-29 RX ADMIN — ACETAMINOPHEN 650 MG: 325 TABLET ORAL at 21:30

## 2023-05-29 RX ADMIN — HYDROXYCHLOROQUINE SULFATE 200 MG: 200 TABLET ORAL at 17:26

## 2023-05-29 RX ADMIN — PREDNISONE 5 MG: 5 TABLET ORAL at 08:36

## 2023-05-29 RX ADMIN — GUAIFENESIN 200 MG: 100 SOLUTION ORAL at 11:51

## 2023-05-29 RX ADMIN — FUROSEMIDE 40 MG: 40 TABLET ORAL at 08:36

## 2023-05-29 RX ADMIN — GUAIFENESIN 600 MG: 600 TABLET, EXTENDED RELEASE ORAL at 21:22

## 2023-05-29 NOTE — CASE MANAGEMENT
Case Management Assessment & Discharge Planning Note    Patient name Milla Fink /-26 MRN [id number]  : 1950 Date 2023       Current Admission Date: 2023  Current Admission Diagnosis:Febrile illness   Patient Active Problem List    Diagnosis Date Noted   • URTI (acute upper respiratory infection) 2023   • Chronic kidney disease 2023   • Febrile illness 2023   • Dermatitis associated with incontinence 2023   • Right shoulder pain 12/15/2022   • Abnormal urinalysis 2022   • Ambulatory dysfunction 2022   • Hyperuricemia 2022   • Chronic diastolic heart failure ([address] ) 2022   • Acute bronchitis 2022   • Assistance needed with transportation 09/15/2022   • Abnormal CT of the chest 2022   • Gram-positive cocci bacteremia 2022   • Pustular psoriasis 2022   • Elevated troponin 2022   • COVID-19 2022   • Paroxysmal atrial fibrillation ([address] ) 01/10/2022   • Hyperparathyroidism ([address] ) 2021   • Murmur, cardiac 2021   • BPPV (benign paroxysmal positional vertigo) 2018   • Seasonal allergic rhinitis due to pollen 2018   • Lower extremity cellulitis 10/27/2017   • Osteoporosis 2016   • SIRS (systemic inflammatory response syndrome) ([address] ) 2016   • Rheumatoid arthritis ([address] ) 2016   • GERD (gastroesophageal reflux disease) 2016   • Sarcoid 2016   • Morbid obesity ([address] ) 2016   • Vitamin D deficiency 2015   • Benign essential hypertension 2014   • Lumbar radiculopathy 2014      LOS (days): 3  Geometric Mean LOS (GMLOS) (days):   Days to GMLOS:     OBJECTIVE:    Risk of Unplanned Readmission Score: 24 34         Current admission status: Inpatient       Preferred Pharmacy:   [address] [place] [address] [id number] [facility], [address] [place] [id number]  Phone: [phone] Fax: [phone] [facility] [address], [address] [address]  Phone: [phone] Fax: [phone]    Primary Care Provider: Marisa Flynn DO    Primary Insurance: MEDICARE  Secondary Insurance: [facility]    ASSESSMENT:  [address], [address] Representative - Child   Primary Phone: [phone] (Home)               Advance Directives  Does patient have a [address]?: No  Does patient have Advance Directives?: No              Patient Information  Admitted from[de-identified] Facility  Mental Status: Alert  During Assessment patient was accompanied by: Daughter  Assessment information provided by[de-identified] Daughter, Patient  Primary Caregiver: Other (Comment) ([facility])  Support Systems: Daughter, Family members  Home entry access options  Select all that apply : Ramp  Type of Current Residence: Facility ([facility])  Upon entering residence, is there a bedroom on the main floor (no further steps)?: Yes  Upon entering residence, is there a bathroom on the main floor (no further steps)?: Yes  In the last 12 months, was there a time when you were not able to pay the mortgage or rent on time?: No  In the last 12 months, how many places have you lived?: 1  In the last 12 months, was there a time when you did not have a steady place to sleep or slept in a shelter (including now)?: No  Living Arrangements: Other (Comment) ([place])    Activities of Daily Living Prior to Admission  Functional Status: Assistance  Completes ADLs independently?: No  Level of ADL dependence: Assistance  Ambulates independently?: No  Level of ambulatory dependence: Assistance  Does patient use assisted devices?: Yes  Assisted Devices (DME) used:  Wheelchair, Porsha Putt  Does patient currently own DME?: Yes  What DME does the patient currently own?: Wheelchair, Porsha Putt  Does patient have a history of Outpatient Therapy (PT/OT)?: No  Does the patient have a history of Short-Term Rehab?: Yes ([facility])  Does patient have a history of HHC?: No         Patient Information Continued  Income Source: Pension/alf  Does patient have prescription coverage?: Yes  Within the past 12 months, you worried that your food would run out before you got the money to buy more : Never true  Within the past 12 months, the food you bought just didn't last and you didn't have money to get more : Never true  Does patient receive dialysis treatments?: No  Does patient have a history of substance abuse?: No  Does patient have a history of Mental Health Diagnosis?: No         Means of Transportation  Means of Transport to Appts[de-identified] Family transport  In the past 12 months, has lack of transportation kept you from medical appointments or from getting medications?: No  In the past 12 months, has lack of transportation kept you from meetings, work, or from getting things needed for daily living?: No        DISCHARGE DETAILS:    Discharge planning discussed with[de-identified] pt and dghter  Freedom of Choice: Yes  Comments - Freedom of Choice: Resident at [address] [place] [address]  Unsure they want her to return                Contacts  Patient Contacts: Chasity Negro, daughter  Relationship to Patient[de-identified] Family  Contact Method: Phone  Phone Number: [phone]  Reason/Outcome: Continuity of Care, Emergency Contact, Discharge Planning                   Would you like to participate in our [address] service program?  : No - Declined                                                 Additional Comments: Resident at [facility], is a bedhold, LTC  Daughter unsure they want her to return there and would like to see if other facilities are available  Do  not want [facility]  Agreeable to blanket referrals, aware if no LTC beds, she may need to return to [facility] and work with them to transition to another facility      A post acute care recommendation was made by your care team for LTC    Discussed Freedom of Choice with patient  List of facilities given to patient via in person  patient aware the list is custom filtered for them by zip code location and that [facility]'s post acute providers are designated    [place] referrals for LTC, STR with transition to LTC via [facility]

## 2023-05-29 NOTE — ASSESSMENT & PLAN NOTE
· Patient with 5-6-day history of increasing lethargy, cough, reported fevers and with low-grade fever here  · Spiked a fever of 103 last evening  · CXR without acute abnormality   · Negative urinalysis, COVID/flu/RSV negative, strep pneumo/Legionella negative  · Without abdominal pain nausea vomiting or diarrhea  · Negative Pro-Tapan x2  · Suspect viral illness with reactive airway disease and viral bronchitis  · She does have significant psoriasis without evidence of superimposed cellulitis  · This could be a flare of her acute pustular psoriasis causing fevers?    · Speech therapy recommending regular with thin liquids   · Antibiotics have been discontinued  · Blood cultures negative at 48 hours  · Monitor for any additional spiking fever  · Needs to be afebrile for 24 hours prior to DC  · We will check venous duplex left upper extremity due to swelling  · Resides at [facility]

## 2023-05-29 NOTE — ASSESSMENT & PLAN NOTE
Wt Readings from Last 3 Encounters:   05/28/23 118 kg (261 lb)   12/16/22 120 kg (264 lb 15 9 oz)   12/08/22 120 kg (265 lb 9 6 oz)     · Difficult to assess volume status due to morbid obesity however family reporting some increased pedal edema    Patient unsure if she has gained weight   · previous cardiac echo on 11/20/2022 with EF 55% and grade 2 diastolic dysfunction  ·   · Continue on oral Lasix 40 mg daily   · Monitor volume status

## 2023-05-29 NOTE — ASSESSMENT & PLAN NOTE
Lab Results   Component Value Date    CREATININE 1 50 (H) 05/28/2023    CREATININE 1 57 (H) 05/27/2023    CREATININE 1 59 (H) 05/26/2023    EGFR 34 05/28/2023    EGFR 32 05/27/2023    EGFR 31 05/26/2023     · Stage III CKD creatinine currently at baseline check BMP daily

## 2023-05-29 NOTE — PLAN OF CARE
Problem: MOBILITY - ADULT  Goal: Maintain or return to baseline ADL function  Description: INTERVENTIONS:  -  Assess patient's ability to carry out ADLs; assess patient's baseline for ADL function and identify physical deficits which impact ability to perform ADLs (bathing, care of mouth/teeth, toileting, grooming, dressing, etc )  - Assess/evaluate cause of self-care deficits   - Assess range of motion  - Assess patient's mobility; develop plan if impaired  - Assess patient's need for assistive devices and provide as appropriate  - Encourage maximum independence but intervene and supervise when necessary  - Involve family in performance of ADLs  - Assess for home care needs following discharge   - Consider OT consult to assist with ADL evaluation and planning for discharge  - Provide patient education as appropriate  Outcome: Progressing     Problem: NEUROSENSORY - ADULT  Goal: Achieves stable or improved neurological status  Description: INTERVENTIONS  - Monitor and report changes in neurological status  - Monitor vital signs such as temperature, blood pressure, glucose, and any other labs ordered   - Initiate measures to prevent increased intracranial pressure  - Monitor for seizure activity and implement precautions if appropriate      Outcome: Progressing     Problem: RESPIRATORY - ADULT  Goal: Achieves optimal ventilation and oxygenation  Description: INTERVENTIONS:  - Assess for changes in respiratory status  - Assess for changes in mentation and behavior  - Position to facilitate oxygenation and minimize respiratory effort  - Oxygen administered by appropriate delivery if ordered  - Initiate smoking cessation education as indicated  - Encourage broncho-pulmonary hygiene including cough, deep breathe, Incentive Spirometry  - Assess the need for suctioning and aspirate as needed  - Assess and instruct to report SOB or any respiratory difficulty  - Respiratory Therapy support as indicated  Outcome: Progressing     Problem: GENITOURINARY - ADULT  Goal: Absence of urinary retention  Description: INTERVENTIONS:  - Assess patient’s ability to void and empty bladder  - Monitor I/O  - Bladder scan as needed  - Discuss with physician/AP medications to alleviate retention as needed  - Discuss catheterization for long term situations as appropriate  Outcome: Progressing     Problem: PAIN - ADULT  Goal: Verbalizes/displays adequate comfort level or baseline comfort level  Description: Interventions:  - Encourage patient to monitor pain and request assistance  - Assess pain using appropriate pain scale  - Administer analgesics based on type and severity of pain and evaluate response  - Implement non-pharmacological measures as appropriate and evaluate response  - Consider cultural and social influences on pain and pain management  - Notify physician/advanced practitioner if interventions unsuccessful or patient reports new pain  Outcome: Progressing     Problem: SAFETY ADULT  Goal: Patient will remain free of falls  Description: INTERVENTIONS:  - Educate patient/family on patient safety including physical limitations  - Instruct patient to call for assistance with activity   - Consult OT/PT to assist with strengthening/mobility   - Keep Call bell within reach  - Keep bed low and locked with side rails adjusted as appropriate  - Keep care items and personal belongings within reach  - Initiate and maintain comfort rounds  - Make Fall Risk Sign visible to staff  - Apply yellow socks and bracelet for high fall risk patients  - Consider moving patient to room near nurses station  Outcome: Progressing

## 2023-05-29 NOTE — PROGRESS NOTES
[facility]  Progress Note  Name: Tara Smart  MRN: [id number]  Unit/Bed#: -98 I Date of Admission: 5/26/2023   Date of Service: 5/29/2023 I Hospital Day: 3    Assessment/Plan   * Febrile illness  Assessment & Plan  · Patient with 5-6-day history of increasing lethargy, cough, reported fevers and with low-grade fever here  · Spiked a fever of 103 last evening  · CXR without acute abnormality   · Negative urinalysis, COVID/flu/RSV negative, strep pneumo/Legionella negative  · Without abdominal pain nausea vomiting or diarrhea  · Negative Pro-Tapan x2  · Suspect viral illness with reactive airway disease and viral bronchitis  · She does have significant psoriasis without evidence of superimposed cellulitis  · This could be a flare of her acute pustular psoriasis causing fevers? · Will ask Rheumatology to eval   · Speech therapy recommending regular with thin liquids   · Antibiotics have been discontinued  · Blood cultures negative at 48 hours  · Monitor for any additional spiking fever  · Needs to be afebrile for 24 hours prior to DC  · We will check venous duplex left upper extremity due to swelling  · Resides at [place]    Chronic kidney disease  Assessment & Plan  Lab Results   Component Value Date    CREATININE 1 50 (H) 05/28/2023    CREATININE 1 57 (H) 05/27/2023    CREATININE 1 59 (H) 05/26/2023    EGFR 34 05/28/2023    EGFR 32 05/27/2023    EGFR 31 05/26/2023     · Stage III CKD creatinine currently at baseline check BMP daily    Chronic diastolic heart failure (HCC)  Assessment & Plan  Wt Readings from Last 3 Encounters:   05/28/23 118 kg (261 lb)   12/16/22 120 kg (264 lb 15 9 oz)   12/08/22 120 kg (265 lb 9 6 oz)     · Difficult to assess volume status due to morbid obesity however family reporting some increased pedal edema    Patient unsure if she has gained weight   · previous cardiac echo on 11/20/2022 with EF 55% and grade 2 diastolic dysfunction  · BNP 216  · Continue on oral Lasix 40 mg daily   · Monitor volume status       Paroxysmal atrial fibrillation (HCC)  Assessment & Plan  · Rate control with Lopressor 25 mg twice daily, continue Eliquis 5 mg twice daily     Lumbar radiculopathy  Assessment & Plan  · Without any significant back pain at this time, continue gabapentin    Morbid obesity (HCC)  Assessment & Plan  · Morbid obesity complicates all facets of care , BMI 46 noted    Rheumatoid arthritis (HCC)  Assessment & Plan  · Continue Plaquenil and prednisone 5 mg bid    · Has not followed with Rheumatology in awhile per family   · Having increased join pain and psoriatic flare   · Will ask Rheumatology to eval            VTE Pharmacologic Prophylaxis: VTE Score: 4 Moderate Risk (Score 3-4) - Pharmacological DVT Prophylaxis Ordered: apixaban (Eliquis)  Patient Centered Rounds: I performed bedside rounds with nursing staff today  Discussions with Specialists or Other Care Team Provider:     Education and Discussions with Family / Patient: left daughter EUSEBIA  Total Time Spent on Date of Encounter in care of patient: 25 minutes This time was spent on one or more of the following: performing physical exam; counseling and coordination of care; obtaining or reviewing history; documenting in the medical record; reviewing/ordering tests, medications or procedures; communicating with other healthcare professionals and discussing with patient's family/caregivers  Current Length of Stay: 3 day(s)  Current Patient Status: Inpatient   Certification Statement: The patient will continue to require additional inpatient hospital stay due to Febrile illness  Discharge Plan: Back to facility once afebrile x24 hours possibly 24 to 48 hours    Code Status: Level 1 - Full Code    Subjective:   Patient has no acute complaints other than pain in left hand with some swelling  She has extensive psoriasis throughout her body  No chest pain increase shortness of breath    She does have a continued cough with some chest congestion  No abdominal pain nausea vomiting or diarrhea  No urinary complaints  She is without fevers or chills this morning  She did spike another fever last evening  Objective:     Vitals:   Temp (24hrs), Av 4 °F (38 °C), Min:98 8 °F (37 1 °C), Max:103 6 °F (39 8 °C)    Temp:  [98 8 °F (37 1 °C)-103 6 °F (39 8 °C)] 99 3 °F (37 4 °C)  HR:  [] 96  Resp:  [15-26] 15  BP: (126-149)/(52-81) 141/52  SpO2:  [91 %-99 %] 98 %  Body mass index is 46 23 kg/m²  Input and Output Summary (last 24 hours): Intake/Output Summary (Last 24 hours) at 2023 0855  Last data filed at 2023 2109  Gross per 24 hour   Intake 1286 ml   Output 950 ml   Net 336 ml       Physical Exam:   Physical Exam  Vitals and nursing note reviewed  Constitutional:       Appearance: She is obese  Comments: chronically ill appearing    Cardiovascular:      Rate and Rhythm: Normal rate and regular rhythm  Pulmonary:      Effort: No respiratory distress  Comments: Difficult to assess lung volume as she will not sit up or any mental anger she is on room air, she has a cough with some chest congestion/wheezing  Abdominal:      General: Bowel sounds are normal       Palpations: Abdomen is soft  Tenderness: There is no abdominal tenderness  There is no guarding  Comments: Large abdominal circumference   Musculoskeletal:         General: Tenderness (left hand, no erythema) present  Right lower leg: Edema present  Left lower leg: Edema present  Comments: decreased ROM RLE from pain, can wiggle toes both sides    Skin:     Comments: Scattered psoriatic plaques throughout entire body onto neck and down entire body to both legs    Neurological:      Mental Status: She is alert and oriented to person, place, and time        Comments: Does not move right lower extremity or left upper arm due to pain limited          Additional Data:     Labs:  Results from last 7 days   Lab Units 05/28/23  0552   EOS PCT % 4   HEMATOCRIT % 35 5   HEMOGLOBIN g/dL 11 2*   LYMPHS PCT % 12*   MONOS PCT % 10   NEUTROS PCT % 73   PLATELETS Thousands/uL 199   WBC Thousand/uL 7 92     Results from last 7 days   Lab Units 05/28/23  0552 05/27/23  0512 05/26/23 2109   ANION GAP mmol/L 2*   < > 5   ALBUMIN g/dL  --   --  2 6*   ALK PHOS U/L  --   --  76   ALT U/L  --   --  55   AST U/L  --   --  54*   BUN mg/dL 24   < > 25   CALCIUM mg/dL 9 1   < > 9 2   CHLORIDE mmol/L 108   < > 106   CO2 mmol/L 28   < > 26   CREATININE mg/dL 1 50*   < > 1 59*   GLUCOSE RANDOM mg/dL 100   < > 86   POTASSIUM mmol/L 3 9   < > 4 2   SODIUM mmol/L 138   < > 137   TOTAL BILIRUBIN mg/dL  --   --  0 62    < > = values in this interval not displayed  Results from last 7 days   Lab Units 05/26/23 2109   INR  1 88*             Results from last 7 days   Lab Units 05/27/23  0512 05/26/23 2109   LACTIC ACID mmol/L  --  1 6   PROCALCITONIN ng/ml 0 15 0 16       Lines/Drains:  Invasive Devices     Peripheral Intravenous Line  Duration           Peripheral IV 05/28/23 Right;Ventral (anterior) Wrist <1 day          Drain  Duration           External Urinary Catheter 1 day                      Imaging: Reviewed radiology reports from this admission including: chest xray    Recent Cultures (last 7 days):   Results from last 7 days   Lab Units 05/27/23  0042 05/26/23 2109   BLOOD CULTURE   --  No Growth at 48 hrs  No Growth at 48 hrs     LEGIONELLA URINARY ANTIGEN  Negative  --        Last 24 Hours Medication List:   Current Facility-Administered Medications   Medication Dose Route Frequency Provider Last Rate   • acetaminophen  650 mg Oral Q4H PRN Gwen Dodrill, DO     • albuterol  2 puff Inhalation Q6H PRN Gwen Dodrill, DO     • albuterol  2 5 mg Nebulization Q4H PRN Gwen Dodrill, DO     • apixaban  5 mg Oral BID Gwen Dodrill, DO     • cholecalciferol  1,000 Units Oral Daily Gwen Dodrill, DO     • furosemide  40 mg Oral Daily Andriette Sails, DO     • gabapentin  100 mg Oral Daily Desire Grave, DO     • gabapentin  300 mg Oral HS Desire Grave, DO     • guaiFENesin  600 mg Oral Q12H [address] Andriette Sails, DO     • guaiFENesin  200 mg Oral Q4H PRN Desiree Sanchez PA-C     • hydrocortisone   Topical 4x Daily PRN Desire Grave, DO     • hydroxychloroquine  200 mg Oral BID Desire Grave, DO     • lidocaine  2 patch Topical Daily Desire Grave, DO     • metoprolol tartrate  25 mg Oral Q12H [address] Desire Grave, DO     • nystatin   Topical BID Desire Grave, DO     • ondansetron  4 mg Intravenous Q6H PRN Desire Grave, DO     • pantoprazole  40 mg Oral Early Morning Desire Grave, DO     • potassium chloride  10 mEq Oral Daily Desire Grave, DO     • predniSONE  5 mg Oral BID With Meals Desire Grave, DO     • senna-docusate sodium  1 tablet Oral BID Andriette Sajose, DO          Today, Patient Was Seen By: Nolvia Rangel PA-C    **Please Note: This note may have been constructed using a voice recognition system  **

## 2023-05-29 NOTE — PLAN OF CARE
Problem: NEUROSENSORY - ADULT  Goal: Achieves stable or improved neurological status  Description: INTERVENTIONS  - Monitor and report changes in neurological status  - Monitor vital signs such as temperature, blood pressure, glucose, and any other labs ordered   - Initiate measures to prevent increased intracranial pressure  - Monitor for seizure activity and implement precautions if appropriate      Outcome: Progressing     Problem: GENITOURINARY - ADULT  Goal: Absence of urinary retention  Description: INTERVENTIONS:  - Assess patient’s ability to void and empty bladder  - Monitor I/O  - Bladder scan as needed  - Discuss with physician/AP medications to alleviate retention as needed  - Discuss catheterization for long term situations as appropriate  Outcome: Progressing     Problem: MUSCULOSKELETAL - ADULT  Goal: Maintain or return mobility to safest level of function  Description: INTERVENTIONS:  - Assess patient's ability to carry out ADLs; assess patient's baseline for ADL function and identify physical deficits which impact ability to perform ADLs (bathing, care of mouth/teeth, toileting, grooming, dressing, etc )  - Assess/evaluate cause of self-care deficits   - Assess range of motion  - Assess patient's mobility  - Assess patient's need for assistive devices and provide as appropriate  - Encourage maximum independence but intervene and supervise when necessary  - Involve family in performance of ADLs  - Assess for home care needs following discharge   - Consider OT consult to assist with ADL evaluation and planning for discharge  - Provide patient education as appropriate  Outcome: Progressing

## 2023-05-30 ENCOUNTER — APPOINTMENT (INPATIENT)
Dept: NON INVASIVE DIAGNOSTICS | Facility: HOSPITAL | Age: 73
DRG: 871 | End: 2023-05-30
Payer: MEDICARE

## 2023-05-30 ENCOUNTER — APPOINTMENT (INPATIENT)
Dept: RADIOLOGY | Facility: HOSPITAL | Age: 73
DRG: 871 | End: 2023-05-30
Payer: MEDICARE

## 2023-05-30 PROBLEM — R22.32 LOCALIZED SWELLING ON LEFT HAND: Status: ACTIVE | Noted: 2023-05-30

## 2023-05-30 PROBLEM — G93.41 METABOLIC ENCEPHALOPATHY: Status: ACTIVE | Noted: 2022-01-10

## 2023-05-30 PROBLEM — G93.41 METABOLIC ENCEPHALOPATHY: Status: RESOLVED | Noted: 2022-01-10 | Resolved: 2023-05-30

## 2023-05-30 LAB
ALBUMIN SERPL BCP-MCNC: 2.2 G/DL (ref 3.5–5)
ALP SERPL-CCNC: 63 U/L (ref 46–116)
ALT SERPL W P-5'-P-CCNC: 42 U/L (ref 12–78)
ANION GAP SERPL CALCULATED.3IONS-SCNC: 2 MMOL/L (ref 4–13)
AST SERPL W P-5'-P-CCNC: 43 U/L (ref 5–45)
BASOPHILS # BLD AUTO: 0.02 THOUSANDS/ÂΜL (ref 0–0.1)
BASOPHILS NFR BLD AUTO: 0 % (ref 0–1)
BILIRUB SERPL-MCNC: 0.44 MG/DL (ref 0.2–1)
BUN SERPL-MCNC: 29 MG/DL (ref 5–25)
CALCIUM ALBUM COR SERPL-MCNC: 10.5 MG/DL (ref 8.3–10.1)
CALCIUM SERPL-MCNC: 9.1 MG/DL (ref 8.3–10.1)
CHLORIDE SERPL-SCNC: 103 MMOL/L (ref 96–108)
CO2 SERPL-SCNC: 29 MMOL/L (ref 21–32)
CREAT SERPL-MCNC: 1.63 MG/DL (ref 0.6–1.3)
EOSINOPHIL # BLD AUTO: 0.25 THOUSAND/ÂΜL (ref 0–0.61)
EOSINOPHIL NFR BLD AUTO: 3 % (ref 0–6)
ERYTHROCYTE [DISTWIDTH] IN BLOOD BY AUTOMATED COUNT: 13.4 % (ref 11.6–15.1)
GFR SERPL CREATININE-BSD FRML MDRD: 30 ML/MIN/1.73SQ M
GLUCOSE SERPL-MCNC: 82 MG/DL (ref 65–140)
HCT VFR BLD AUTO: 34.9 % (ref 34.8–46.1)
HGB BLD-MCNC: 11 G/DL (ref 11.5–15.4)
IMM GRANULOCYTES # BLD AUTO: 0.05 THOUSAND/UL (ref 0–0.2)
IMM GRANULOCYTES NFR BLD AUTO: 1 % (ref 0–2)
LYMPHOCYTES # BLD AUTO: 0.9 THOUSANDS/ÂΜL (ref 0.6–4.47)
LYMPHOCYTES NFR BLD AUTO: 10 % (ref 14–44)
MCH RBC QN AUTO: 28.8 PG (ref 26.8–34.3)
MCHC RBC AUTO-ENTMCNC: 31.5 G/DL (ref 31.4–37.4)
MCV RBC AUTO: 91 FL (ref 82–98)
MONOCYTES # BLD AUTO: 0.63 THOUSAND/ÂΜL (ref 0.17–1.22)
MONOCYTES NFR BLD AUTO: 7 % (ref 4–12)
NEUTROPHILS # BLD AUTO: 6.83 THOUSANDS/ÂΜL (ref 1.85–7.62)
NEUTS SEG NFR BLD AUTO: 79 % (ref 43–75)
NRBC BLD AUTO-RTO: 0 /100 WBCS
PLATELET # BLD AUTO: 213 THOUSANDS/UL (ref 149–390)
PMV BLD AUTO: 11 FL (ref 8.9–12.7)
POTASSIUM SERPL-SCNC: 4.2 MMOL/L (ref 3.5–5.3)
PROCALCITONIN SERPL-MCNC: 0.59 NG/ML
PROT SERPL-MCNC: 6.8 G/DL (ref 6.4–8.4)
RBC # BLD AUTO: 3.82 MILLION/UL (ref 3.81–5.12)
SODIUM SERPL-SCNC: 134 MMOL/L (ref 135–147)
WBC # BLD AUTO: 8.68 THOUSAND/UL (ref 4.31–10.16)

## 2023-05-30 PROCEDURE — 93971 EXTREMITY STUDY: CPT | Performed by: SURGERY

## 2023-05-30 PROCEDURE — 94760 N-INVAS EAR/PLS OXIMETRY 1: CPT | Performed by: SOCIAL WORKER

## 2023-05-30 PROCEDURE — 84145 PROCALCITONIN (PCT): CPT | Performed by: PHYSICIAN ASSISTANT

## 2023-05-30 PROCEDURE — 99232 SBSQ HOSP IP/OBS MODERATE 35: CPT | Performed by: PHYSICIAN ASSISTANT

## 2023-05-30 PROCEDURE — 80053 COMPREHEN METABOLIC PANEL: CPT | Performed by: PHYSICIAN ASSISTANT

## 2023-05-30 PROCEDURE — 93971 EXTREMITY STUDY: CPT

## 2023-05-30 PROCEDURE — 85025 COMPLETE CBC W/AUTO DIFF WBC: CPT | Performed by: PHYSICIAN ASSISTANT

## 2023-05-30 PROCEDURE — 94664 DEMO&/EVAL PT USE INHALER: CPT | Performed by: SOCIAL WORKER

## 2023-05-30 PROCEDURE — 73130 X-RAY EXAM OF HAND: CPT

## 2023-05-30 PROCEDURE — NC001 PR NO CHARGE: Performed by: DERMATOLOGY

## 2023-05-30 RX ORDER — CLOBETASOL PROPIONATE 0.5 MG/G
CREAM TOPICAL 2 TIMES DAILY
Status: DISCONTINUED | OUTPATIENT
Start: 2023-05-30 | End: 2023-06-01 | Stop reason: HOSPADM

## 2023-05-30 RX ADMIN — Medication 1000 UNITS: at 09:01

## 2023-05-30 RX ADMIN — NYSTATIN: 100000 POWDER TOPICAL at 09:02

## 2023-05-30 RX ADMIN — PANTOPRAZOLE SODIUM 40 MG: 40 TABLET, DELAYED RELEASE ORAL at 05:31

## 2023-05-30 RX ADMIN — FUROSEMIDE 40 MG: 40 TABLET ORAL at 09:01

## 2023-05-30 RX ADMIN — PREDNISONE 5 MG: 5 TABLET ORAL at 09:04

## 2023-05-30 RX ADMIN — APIXABAN 5 MG: 5 TABLET, FILM COATED ORAL at 17:49

## 2023-05-30 RX ADMIN — HYDROXYCHLOROQUINE SULFATE 200 MG: 200 TABLET ORAL at 09:02

## 2023-05-30 RX ADMIN — GUAIFENESIN 600 MG: 600 TABLET, EXTENDED RELEASE ORAL at 21:45

## 2023-05-30 RX ADMIN — METOPROLOL TARTRATE 25 MG: 25 TABLET, FILM COATED ORAL at 09:01

## 2023-05-30 RX ADMIN — HYDROXYCHLOROQUINE SULFATE 200 MG: 200 TABLET ORAL at 17:49

## 2023-05-30 RX ADMIN — GABAPENTIN 300 MG: 300 CAPSULE ORAL at 21:45

## 2023-05-30 RX ADMIN — PREDNISONE 5 MG: 5 TABLET ORAL at 17:49

## 2023-05-30 RX ADMIN — GUAIFENESIN 600 MG: 600 TABLET, EXTENDED RELEASE ORAL at 09:01

## 2023-05-30 RX ADMIN — NYSTATIN: 100000 POWDER TOPICAL at 17:49

## 2023-05-30 RX ADMIN — APIXABAN 5 MG: 5 TABLET, FILM COATED ORAL at 09:01

## 2023-05-30 RX ADMIN — GABAPENTIN 100 MG: 100 CAPSULE ORAL at 09:01

## 2023-05-30 RX ADMIN — POTASSIUM CHLORIDE 10 MEQ: 750 TABLET, EXTENDED RELEASE ORAL at 09:01

## 2023-05-30 RX ADMIN — LIDOCAINE 5% 2 PATCH: 700 PATCH TOPICAL at 09:01

## 2023-05-30 NOTE — ASSESSMENT & PLAN NOTE
· Maintained on Plaquenil 200 mg twice daily and prednisone 5 mg twice daily although she has not followed with rheumatology in a long time  · She does report increased joint pain, will ask rheumatology to evaluate while here

## 2023-05-30 NOTE — ASSESSMENT & PLAN NOTE
Likely secondary to viral illness, resolved now back to baseline  Continue to monitor mentation  Delirium precautions

## 2023-05-30 NOTE — ASSESSMENT & PLAN NOTE
· Does report increased joint pain specifically right hip/leg and left arm , without any significant back pain at this time, continue gabapentin

## 2023-05-30 NOTE — WOUND OSTOMY CARE
Consult Note - Wound   Fatemeh Awad 74 y o  female MRN: [id number]  Unit/Bed#: -01 Encounter: [id number]        History and Present Illness:  Patient is a 73 yo female that was admitted to [facility] for treatment of febrile Illness  Patient has a PMH of GERD, HTN  BMI of 44 85 with significant skin folds  Patient is a moderate/max assist for turning and repositioning  Patient is incontinent at times of bowel and bladder  On assessment, patient is lying on regular mattress  Wound Care was consulted for skin rash with open areas  Assessment Findings:   B/L heels are dry intact and karly with no skin loss or wounds present  Recommend preventative Hydraguard Cream and proper offloading/ repositioning  B/L sacro-buttocks is dry, intact, and blanches  Intact psoriasis plaques noted  No skin loss or wounds present  Recommend preventative hydragaurd to area due to incontinence  Dermatology consulted    Patient with full body psoriasis plaques including sacro-buttocks, stomach folds, chest, arms, and thighs  All area dry and intact with no skin loss or drainage  Dermatology consulted  Will recommend moisturizing cream BID and PRN until dermatology places recommendations  - Abdomen     - Left thigh      - Right Thigh           No induration, fluctuance, odor, warmth/temperature differences, redness, or purulence noted to the above noted wounds and skin areas assessed  New dressings applied per orders listed below  Patient tolerated well- no s/s of non-verbal pain or discomfort observed during the encounter  Bedside nurse aware of plan of care  See flow sheets for more detailed assessment findings  Orders listed below and wound care will sign off, call or tiger text with questions  Skin Care Plan:  1-Dermatology Consult Placed - Awaiting recommendations   2-Turn/reposition q2h or when medically stable for pressure re-distribution on skin   3-Elevate heels to offload pressure    4-Moisturize skin with skin nourishing cream (Purple top cream) BID and PRN  5-Ehob cushion in chair when out of bed  6-Preventative Hydraguard to bilateral heels and sacro-buttocks BID and PRN           Hollis Hubbard RN, BSN

## 2023-05-30 NOTE — ASSESSMENT & PLAN NOTE
· Suspecting viral sepsis present on admission due to suspected viral upper respiratory tract infection as evidenced by fever, tachycardia, tachypnea  · Could be fever due to rheumatologic illnesses as well given not source clearly identified with work up   · Blood cultures negative to date  · COVID/flu/RSV negative  · Plan above

## 2023-05-30 NOTE — ASSESSMENT & PLAN NOTE
Lab Results   Component Value Date    CREATININE 1 63 (H) 05/30/2023    CREATININE 1 50 (H) 05/28/2023    CREATININE 1 57 (H) 05/27/2023    EGFR 30 05/30/2023    EGFR 34 05/28/2023    EGFR 32 05/27/2023     · Stage III CKD   · Monitor BMP daily

## 2023-05-30 NOTE — PROGRESS NOTES
[facility]  Progress Note  Name: Milton Khan  MRN: [id number]  Unit/Bed#: -84 I Date of Admission: 5/26/2023   Date of Service: 5/30/2023 I Hospital Day: 4    Assessment/Plan   * Febrile illness  Assessment & Plan  · Patient with 5-6-day history of increasing lethargy, cough, reported fevers, from [place]  · CXR without acute abnormality to suggest pneumonia  · Negative urinalysis, COVID/flu/RSV negative, strep pneumo/Legionella negative  · Without abdominal pain nausea vomiting or diarrhea  · Negative Pro-Tapan x2  · Suspect viral upper respiratory infection  with reactive airway disease   · She does have significant psoriasis with an acute flare without evidence of superimposed cellulitis  · This could be a flare of her acute pustular psoriasis causing fevers?    · Will ask dermatology to eval   · Patient also with increased joint pain, has not seen Rheum in a while per family, requested eval here   · Speech therapy recommending regular with thin liquids   · Antibiotics have been discontinued due to negative procal x 2, procal did increase today   · Blood cultures negative to date   · Monitor for any additional spiking fever, last fever even 5/28   · We will check venous duplex left upper extremity due to swelling which is pending   · Resides at [facility] , daughter does not want her to go back to [facility], will discuss alternative rehab placement with case management    Pustular psoriasis  Assessment & Plan  · Patient with biopsy-proven pustular psoriasis, worsening rash for about 1 week extending up to the neck  · She remains on her baseline prednisone 5 mg twice daily  · We will ask dermatology to evaluate given worsening/flare of her pustular psoriasis    Localized swelling on left hand  Assessment & Plan  · Left hand swelling, possibly from psoriasis flare   · Pulses intact   · Awaiting venous duplex and xray     URTI (acute upper respiratory infection)  Assessment & Plan  · Likely viral upper respiratory tract infection, continue supportive care  · she remains on room air throughout hospital course    Chronic kidney disease  Assessment & Plan  Lab Results   Component Value Date    CREATININE 1 63 (H) 05/30/2023    CREATININE 1 50 (H) 05/28/2023    CREATININE 1 57 (H) 05/27/2023    EGFR 30 05/30/2023    EGFR 34 05/28/2023    EGFR 32 05/27/2023     · Stage III CKD   · Monitor BMP daily    Chronic diastolic heart failure (HCC)  Assessment & Plan  Wt Readings from Last 3 Encounters:   05/28/23 118 kg (261 lb)   12/16/22 120 kg (264 lb 15 9 oz)   12/08/22 120 kg (265 lb 9 6 oz)     · Difficult to assess volume status due to morbid obesity however family reporting some increased pedal edema on admission    Patient unsure if she has gained weight   · previous cardiac echo on 11/20/2022 with EF 55% and grade 2 diastolic dysfunction  ·   · Continue on oral Lasix 40 mg daily   · Monitor volume status daily, daily weight, I/O    Paroxysmal atrial fibrillation (HCC)  Assessment & Plan  · Rate controlled on Lopressor 25 mg every 12 hours   · anticoagulated on Eliquis 5 mg twice     Lumbar radiculopathy  Assessment & Plan  · Does report increased joint pain specifically right hip/leg and left arm , without any significant back pain at this time, continue gabapentin    Morbid obesity (HCC)  Assessment & Plan  · Morbid obesity complicates all facets of care , BMI 46 noted    Rheumatoid arthritis (HCC)  Assessment & Plan  · Maintained on Plaquenil 200 mg twice daily and prednisone 5 mg twice daily although she has not followed with rheumatology in a long time  · She does report increased joint pain, will ask rheumatology to evaluate while here       Metabolic encephalopathy   Secondary to acute infection now resolved back to baseline  Continue to monitor mentation    Sepsis  -Suspecting viral sepsis in setting of upper respiratory tract infection present on admission  -See plan throughout      VTE Pharmacologic Prophylaxis: VTE Score: 4 Moderate Risk (Score 3-4) - Pharmacological DVT Prophylaxis Ordered: apixaban (Eliquis)  Patient Centered Rounds: I performed bedside rounds with nursing staff today  Discussions with Specialists or Other Care Team Provider: Dermatlogy, TT Rheumatology     Education and Discussions with Family / Patient: Updated  (daughter) via phone  Total Time Spent on Date of Encounter in care of patient: 25 minutes This time was spent on one or more of the following: performing physical exam; counseling and coordination of care; obtaining or reviewing history; documenting in the medical record; reviewing/ordering tests, medications or procedures; communicating with other healthcare professionals and discussing with patient's family/caregivers  Current Length of Stay: 4 day(s)  Current Patient Status: Inpatient   Certification Statement: The patient will continue to require additional inpatient hospital stay due to Febrile illness, pustular psoriasis  Discharge Plan: Needs discharge to rehab, daughter does not want her to go back to  [facility] Will need new placement possibly in 24 to 48 hours    Code Status: Level 1 - Full Code    Subjective:   Patient resting in bed  States she feels the same as yesterday  No worsening cough  No fevers overnight  No chest pain  No increased wheezing  No abdominal pain nausea or vomiting  She continues to have right leg/hip pain and left hand swelling with pain  Objective:     Vitals:   Temp (24hrs), Av 7 °F (37 1 °C), Min:98 3 °F (36 8 °C), Max:99 3 °F (37 4 °C)    Temp:  [98 3 °F (36 8 °C)-99 3 °F (37 4 °C)] 98 3 °F (36 8 °C)  HR:  [] 93  Resp:  [16] 16  BP: (133-140)/(57-70) 140/70  SpO2:  [95 %-100 %] 98 %  Body mass index is 46 23 kg/m²  Input and Output Summary (last 24 hours):      Intake/Output Summary (Last 24 hours) at 2023 0845  Last data filed at 2023 0501  Gross per 24 hour   Intake 1196 ml   Output 700 ml   Net 496 ml       Physical Exam:   Physical Exam  Vitals and nursing note reviewed  Constitutional:       General: She is not in acute distress  Appearance: She is obese  She is not toxic-appearing or diaphoretic  Cardiovascular:      Rate and Rhythm: Normal rate and regular rhythm  Pulmonary:      Effort: Pulmonary effort is normal  No respiratory distress  Comments: Dry cough, on room air  Abdominal:      General: Bowel sounds are normal       Palpations: Abdomen is soft  Musculoskeletal:         General: Swelling (left hand) and tenderness (throughout entire body specifically left hand and right leg) present  Right lower leg: Edema present  Left lower leg: Edema present  Skin:     Comments: Extensive pustular psoriasis throughout entire body up to neck and inbetween digits, erythema around psoriasis in between inner thighs without evidence for cellulitis   Neurological:      Mental Status: She is alert  Mental status is at baseline     Psychiatric:         Mood and Affect: Mood normal           Additional Data:     Labs:  Results from last 7 days   Lab Units 05/30/23  0504   EOS PCT % 3   HEMATOCRIT % 34 9   HEMOGLOBIN g/dL 11 0*   LYMPHS PCT % 10*   MONOS PCT % 7   NEUTROS PCT % 79*   PLATELETS Thousands/uL 213   WBC Thousand/uL 8 68     Results from last 7 days   Lab Units 05/30/23  0504   ANION GAP mmol/L 2*   ALBUMIN g/dL 2 2*   ALK PHOS U/L 63   ALT U/L 42   AST U/L 43   BUN mg/dL 29*   CALCIUM mg/dL 9 1   CHLORIDE mmol/L 103   CO2 mmol/L 29   CREATININE mg/dL 1 63*   GLUCOSE RANDOM mg/dL 82   POTASSIUM mmol/L 4 2   SODIUM mmol/L 134*   TOTAL BILIRUBIN mg/dL 0 44     Results from last 7 days   Lab Units 05/26/23  2109   INR  1 88*             Results from last 7 days   Lab Units 05/30/23  0504 05/27/23  0512 05/26/23 2109   LACTIC ACID mmol/L  --   --  1 6   PROCALCITONIN ng/ml 0 59* 0 15 0 16       Lines/Drains:  Invasive Devices Peripheral Intravenous Line  Duration           Peripheral IV 05/28/23 Right;Ventral (anterior) Wrist 1 day          Drain  Duration           External Urinary Catheter 2 days                      Imaging: awaiting venous duplex LUE    Recent Cultures (last 7 days):   Results from last 7 days   Lab Units 05/27/23  0042 05/26/23  2109   BLOOD CULTURE   --  No Growth at 72 hrs  No Growth at 72 hrs  LEGIONELLA URINARY ANTIGEN  Negative  --        Last 24 Hours Medication List:   Current Facility-Administered Medications   Medication Dose Route Frequency Provider Last Rate   • acetaminophen  650 mg Oral Q4H PRN Victorine Sago, DO     • albuterol  2 puff Inhalation Q6H PRN Victorine Sago, DO     • apixaban  5 mg Oral BID Victorine Sago, DO     • cholecalciferol  1,000 Units Oral Daily Victorine Sago, DO     • furosemide  40 mg Oral Daily Madie , DO     • gabapentin  100 mg Oral Daily Victorine Sago, DO     • gabapentin  300 mg Oral HS Victorine Sago, DO     • guaiFENesin  600 mg Oral Q12H [address] Madie , DO     • guaiFENesin  200 mg Oral Q4H PRN Mina Butler PA-C     • hydrocortisone   Topical 4x Daily PRN Victorine Sago, DO     • hydroxychloroquine  200 mg Oral BID Victorine Sago, DO     • lidocaine  2 patch Topical Daily Victorine Sago, DO     • metoprolol tartrate  25 mg Oral Q12H [address] Victorine Sago, DO     • nystatin   Topical BID Victorine Sago, DO     • ondansetron  4 mg Intravenous Q6H PRN Victorine Sago, DO     • pantoprazole  40 mg Oral Early Morning Victorine Sago, DO     • potassium chloride  10 mEq Oral Daily Victorine Sago, DO     • predniSONE  5 mg Oral BID With Meals Victorine Sago, DO     • senna-docusate sodium  1 tablet Oral BID Madie , DO          Today, Patient Was Seen By: Gail Persaud PA-C    **Please Note: This note may have been constructed using a voice recognition system  **

## 2023-05-30 NOTE — CONSULTS
DERMATOLOGY:  CONSULTATION   Gab Russ 74 y o  female MRN: [id number]  Unit/Bed#: -01 Encounter: [id number]      Consults    Assessment/Recommendations   Based on a thorough discussion of this condition and the management approach to it (including a comprehensive discussion of the known risks, side effects and potential benefits of treatment), the patient (family) agrees to implement the following specific plan:    1  Generalized Pustular Psoriasis     Patient presenting with documented fevers, tachycardia, decreased kidney function and mildly elevated LFTS  She admits to fatigue, myalgias, joint pain and worsening rash  These presenting signs and symptoms in the setting of negative infectious work-up can indicate an acute flare of generalized pustular psoriasis  Plan:   · Will treat with topical corticosteroids (clobetasol 0 05% ointment) twice daily as the patient has responded to topical therapies in the past    · Our goal is to transition the patient to either a newer biologic agent indicated for pustular psoriasis such as tremfya or low dose acitretin  Acitretin was considered in the past however may be contraindicated with her worsening kidney disease and morbid obesity  · Considerations of systemic therapies are crucial for prevention long term health complications and repeat readmissions  · Both a systemic biologic and acitretin therapy will require labs at baseline and frequent lab monitoring  Therefore, close outpatient monitoring will be necessary to our patient's care  · Coordination of care with the patient's SNF and daughter will be of upmost importance before discharge  Please set up discharge follow up by calling our office: [phone] ([phone])     Thank you for involving me in the care of your patient  Please call with questions, change in clinical status or if tests recommended above are abnormal      Discussed with the primary service        History:     HISTORY OF PRESENT ILLNESS:    Reason for Consult: pustular psoriasis   HPI: Haider Staff is a 74y o  year old female with PMHx of CDHF, Paroxysmal A-fib, rheumatoid arthritis, and morbid obesity presenting to [facility] ED from SNF with concerns of increasing lethargy, joint pains and fevers of approximately 1 week duration  Infectious work-up has been negative at this time  Blood cultures showing no growth at 72 hours, CXR WNL, urinalysis WNL, COVID negative, strep pneumo/legionella negative, procal negative  Remarkable work-up includes small decrease in kidney function from baseline, slightly elevated LFTs, documented fevers of 103F  Patient is known to dermatology during previous admissions and has biopsy proven pustular psoriasis  Her psoriasis has been managed well with topical corticosteroids while in the inpatient setting  Patient admits that she continues to apply topical steroids in the outpatient setting  Previous goals that were agreed upon by the patient and the daughter included transitioning the patient to a biologic or acitretin in the outpatient setting  Unfortunately, there has been multiple attempts to arrange for outpatient follow up upon discharge in the past but it has been extremely difficult to reach the patient or her daughter outside the hospital setting  ROS:  ROS is negative otherwise stated in HPI         PAST MEDICAL HISTORY:  Past Medical History:   Diagnosis Date   • Abnormal thyroid function test     last assessed: 2015    • Arthritis    • Caries     last assessed: 2016    • Continuous opioid dependence ([facility] [address] ) 2021   • Edema of right lower extremity     last assessed: 2015    • GERD (gastroesophageal reflux disease)    • Hypertension    • Medicare annual wellness visit, subsequent 2021   • Positive blood culture 3/11/2022   • Sarcoid      Past Surgical History:   Procedure Laterality Date   •  SECTION     • MULTIPLE TOOTH EXTRACTIONS N/A 2016 Procedure: Surgical extraction of teeth 2, 18, 19, 30, 31; incision and drainage of left subperiosteal abscess ;  Surgeon: Anayeli Sheth DMD;  Location: [facility];  Service:        FAMILY HISTORY:  Non-contributory    SOCIAL HISTORY:  Social History   Single  Social History     Substance and Sexual Activity   Alcohol Use Not Currently     Social History     Substance and Sexual Activity   Drug Use No     Social History     Tobacco Use   Smoking Status Never   Smokeless Tobacco Never       ALLERGIES:  Allergies   Allergen Reactions   • Methotrexate Derivatives    • Amoxicillin Rash   • Ampicillin-Sulbactam Sodium Rash   • Shellfish-Derived Products - Food Allergy Itching       MEDICATIONS:  All current active medications have been reviewed  Physical exam:     Temp:  [98 3 °F (36 8 °C)-99 3 °F (37 4 °C)] 98 4 °F (36 9 °C)  HR:  [] 90  Resp:  [18] 18  BP: (133-144)/(65-70) 144/70  SpO2:  [95 %-99 %] 99 %  Temp (24hrs), Av 7 °F (37 1 °C), Min:98 3 °F (36 8 °C), Max:99 3 °F (37 4 °C)  Current: Temperature: 98 4 °F (36 9 °C)                        Well circumscribed plaques with micaceous scale covering the upper back, scalp, upper chest, abdomen, and proximal extremities  Approximately 40% BSA  Labs, Imaging, & Other Studies     Lab Results:  I have personally reviewed pertinent labs  Results from last 7 days   Lab Units 23  0504 23  0552 23  0512   HEMOGLOBIN g/dL 11 0* 11 2* 10 9*   PLATELETS Thousands/uL 213 199 195   WBC Thousand/uL 8 68 7 92 6 78     Results from last 7 days   Lab Units 23  0504 23  0512 23  2109   ALK PHOS U/L 63  --  76   ALT U/L 42  --  55   AST U/L 43  --  54*   BUN mg/dL 29*   < > 25   CALCIUM mg/dL 9 1   < > 9 2   CHLORIDE mmol/L 103   < > 106   CO2 mmol/L 29   < > 26   CREATININE mg/dL 1 63*   < > 1 59*   EGFR ml/min/1 73sq m 30   < > 31   POTASSIUM mmol/L 4 2   < > 4 2    < > = values in this interval not displayed  Results from last 7 days   Lab Units 05/27/23  0359 05/27/23  0042 05/26/23  2109   BLOOD CULTURE   --   --  No Growth at 72 hrs  No Growth at 72 hrs  LEGIONELLA URINARY ANTIGEN   --  Negative  --    MRSA CULTURE ONLY  No Methicillin Resistant Staphlyococcus aureus (MRSA) isolated  --   --            Pathology:   I have personally reviewed pertinent reports

## 2023-05-30 NOTE — DISCHARGE INSTR - OTHER ORDERS
Skin Care Plan:  1-Dermatology Consult Placed - Awaiting recommendations   2-Turn/reposition q2h or when medically stable for pressure re-distribution on skin   3-Elevate heels to offload pressure  4-Moisturize skin with skin nourishing cream (Purple top cream) BID and PRN  5-Ehob cushion in chair when out of bed  6-Preventative Hydraguard to bilateral heels and sacro-buttocks BID and PRN

## 2023-05-30 NOTE — PLAN OF CARE
Problem: NEUROSENSORY - ADULT  Goal: Achieves stable or improved neurological status  Description: INTERVENTIONS  - Monitor and report changes in neurological status  - Monitor vital signs such as temperature, blood pressure, glucose, and any other labs ordered   - Initiate measures to prevent increased intracranial pressure  - Monitor for seizure activity and implement precautions if appropriate      Outcome: Progressing     Problem: RESPIRATORY - ADULT  Goal: Achieves optimal ventilation and oxygenation  Description: INTERVENTIONS:  - Assess for changes in respiratory status  - Assess for changes in mentation and behavior  - Position to facilitate oxygenation and minimize respiratory effort  - Oxygen administered by appropriate delivery if ordered  - Initiate smoking cessation education as indicated  - Encourage broncho-pulmonary hygiene including cough, deep breathe, Incentive Spirometry  - Assess the need for suctioning and aspirate as needed  - Assess and instruct to report SOB or any respiratory difficulty  - Respiratory Therapy support as indicated  Outcome: Progressing     Problem: GENITOURINARY - ADULT  Goal: Absence of urinary retention  Description: INTERVENTIONS:  - Assess patient’s ability to void and empty bladder  - Monitor I/O  - Bladder scan as needed  - Discuss with physician/AP medications to alleviate retention as needed  - Discuss catheterization for long term situations as appropriate  Outcome: Progressing     Problem: PAIN - ADULT  Goal: Verbalizes/displays adequate comfort level or baseline comfort level  Description: Interventions:  - Encourage patient to monitor pain and request assistance  - Assess pain using appropriate pain scale  - Administer analgesics based on type and severity of pain and evaluate response  - Implement non-pharmacological measures as appropriate and evaluate response  - Consider cultural and social influences on pain and pain management  - Notify physician/advanced practitioner if interventions unsuccessful or patient reports new pain  Outcome: Progressing

## 2023-05-30 NOTE — CASE MANAGEMENT
Case Management Discharge Planning Note    Patient name Ajith Barboza  Location /-15 MRN [id number]  : 1950 Date 2023       Current Admission Date: 2023  Current Admission Diagnosis:Febrile illness   Patient Active Problem List    Diagnosis Date Noted   • Localized swelling on left hand 2023   • URTI (acute upper respiratory infection) 2023   • Chronic kidney disease 2023   • Febrile illness 2023   • Dermatitis associated with incontinence 2023   • Right shoulder pain 12/15/2022   • Abnormal urinalysis 2022   • Ambulatory dysfunction 2022   • Hyperuricemia 2022   • Chronic diastolic heart failure ([address] ) 2022   • Acute bronchitis 2022   • Assistance needed with transportation 09/15/2022   • Abnormal CT of the chest 2022   • Gram-positive cocci bacteremia 2022   • Pustular psoriasis 2022   • Elevated troponin 2022   • COVID-19 2022   • Paroxysmal atrial fibrillation ([address] ) 01/10/2022   • Sepsis ([address] ) 01/10/2022   • Hyperparathyroidism ([address] ) 2021   • Murmur, cardiac 2021   • BPPV (benign paroxysmal positional vertigo) 2018   • Seasonal allergic rhinitis due to pollen 2018   • Lower extremity cellulitis 10/27/2017   • Osteoporosis 2016   • SIRS (systemic inflammatory response syndrome) ([address] ) 2016   • Rheumatoid arthritis ([address] ) 2016   • GERD (gastroesophageal reflux disease) 2016   • Sarcoid 2016   • Morbid obesity ([address] ) 2016   • Vitamin D deficiency 2015   • Benign essential hypertension 2014   • Lumbar radiculopathy 2014      LOS (days): 4  Geometric Mean LOS (GMLOS) (days): 2 20  Days to GMLOS:-1 5     OBJECTIVE:  Risk of Unplanned Readmission Score: 24 41         Current admission status: Inpatient   Preferred Pharmacy:   [address] [place] [address] #[id number] [address]  Phone: [phone] Fax: [phone] [facility], [address] [place] [address]  Phone: [phone] Fax: [phone]    Primary Care Provider: Marisa Flynn DO    Primary Insurance: MEDICARE  Secondary Insurance: [facility]    DISCHARGE DETAILS:    Discharge planning discussed with[de-identified] Daughter Cheryl Nolasco of Choice: Yes  Comments - Freedom of Choice: Resides at [address] [place] [address] referral sent for other [place] facilities to accept  CM contacted family/caregiver?: Yes  Were Treatment Team discharge recommendations reviewed with patient/caregiver?: Yes  Did patient/caregiver verbalize understanding of patient care needs?: Yes  Were patient/caregiver advised of the risks associated with not following Treatment Team discharge recommendations?: Yes    Contacts  Patient Contacts: destiny Galvan  Relationship to Patient[de-identified] Family  Contact Method: In Person     Additional Comments: CM spoke with daughter Melina Monsivais in detail regarding the discharge plan back to [facility] where she resides as a LTC patient, if we can find another facility to accept patient  Melina Monsivais is aware that if no other facility accepts prior to discharge, she will return to [address] [place], [place] [address] and the liaison there can assist with finding another facility for placement  CM to follow up with [facility] referrals

## 2023-05-30 NOTE — ASSESSMENT & PLAN NOTE
· Left hand swelling, possibly from psoriasis flare   · Pulses intact   · Awaiting venous duplex and xray

## 2023-05-30 NOTE — ASSESSMENT & PLAN NOTE
· Patient with biopsy-proven pustular psoriasis, worsening rash for about 1 week extending up to the neck  · She remains on her baseline prednisone 5 mg twice daily  · We will ask dermatology to evaluate given worsening/flare of her pustular psoriasis

## 2023-05-30 NOTE — ASSESSMENT & PLAN NOTE
· Likely viral upper respiratory tract infection, continue supportive care  · she remains on room air throughout hospital course

## 2023-05-30 NOTE — RESPIRATORY THERAPY NOTE
Resp care   05/30/23 0730   Respiratory Protocol   Respiratory Plan Discontinue Protocol   Respiratory Assessment   Assessment Type Assess only   General Appearance Awake; Alert   Respiratory Pattern Normal   Chest Assessment Chest expansion symmetrical   Bilateral Breath Sounds Clear   Resp Comments Pt offers no resp c/o  uses prn albuterol mdi at home  No known pulm hx documented  Pt has not needed prn udn since ordered  Per protocol if not needed x48 hrs will d/c  Pt has prn mdi ordered  Will d/c prn udn and d/c pt from resp protocol  No recent cxr     Additional Assessments   SpO2 97 %

## 2023-05-30 NOTE — ASSESSMENT & PLAN NOTE
· Patient with 5-6-day history of increasing lethargy, cough, reported fevers, from [place]  · CXR without acute abnormality to suggest pneumonia  · Negative urinalysis, COVID/flu/RSV negative, strep pneumo/Legionella negative  · Without abdominal pain nausea vomiting or diarrhea  · Negative Pro-Tapan x2  · Suspect viral upper respiratory infection  with reactive airway disease   · She does have significant psoriasis with an acute flare without evidence of superimposed cellulitis  · This could be a flare of her acute pustular psoriasis causing fevers?    · Will ask dermatology to eval   · Patient also with increased joint pain, has not seen Rheum in a while per family, requested eval here   · Speech therapy recommending regular with thin liquids   · Antibiotics have been discontinued due to negative procal x 2, procal did increase today   · Blood cultures negative to date   · Monitor for any additional spiking fever, last fever even 5/28   · We will check venous duplex left upper extremity due to swelling which is pending   · Resides at [facility] , daughter does not want her to go back to [facility], will discuss alternative rehab placement with case management

## 2023-05-30 NOTE — ASSESSMENT & PLAN NOTE
Wt Readings from Last 3 Encounters:   05/28/23 118 kg (261 lb)   12/16/22 120 kg (264 lb 15 9 oz)   12/08/22 120 kg (265 lb 9 6 oz)     · Difficult to assess volume status due to morbid obesity however family reporting some increased pedal edema on admission    Patient unsure if she has gained weight   · previous cardiac echo on 11/20/2022 with EF 55% and grade 2 diastolic dysfunction  ·   · Continue on oral Lasix 40 mg daily   · Monitor volume status daily, daily weight, I/O

## 2023-05-31 LAB
ANION GAP SERPL CALCULATED.3IONS-SCNC: 3 MMOL/L (ref 4–13)
BUN SERPL-MCNC: 34 MG/DL (ref 5–25)
CALCIUM SERPL-MCNC: 9 MG/DL (ref 8.3–10.1)
CHLORIDE SERPL-SCNC: 107 MMOL/L (ref 96–108)
CO2 SERPL-SCNC: 28 MMOL/L (ref 21–32)
CREAT SERPL-MCNC: 1.46 MG/DL (ref 0.6–1.3)
ERYTHROCYTE [DISTWIDTH] IN BLOOD BY AUTOMATED COUNT: 13.4 % (ref 11.6–15.1)
GFR SERPL CREATININE-BSD FRML MDRD: 35 ML/MIN/1.73SQ M
GLUCOSE SERPL-MCNC: 82 MG/DL (ref 65–140)
HCT VFR BLD AUTO: 33 % (ref 34.8–46.1)
HGB BLD-MCNC: 9.9 G/DL (ref 11.5–15.4)
MCH RBC QN AUTO: 28.1 PG (ref 26.8–34.3)
MCHC RBC AUTO-ENTMCNC: 30 G/DL (ref 31.4–37.4)
MCV RBC AUTO: 94 FL (ref 82–98)
PLATELET # BLD AUTO: 219 THOUSANDS/UL (ref 149–390)
PMV BLD AUTO: 11.1 FL (ref 8.9–12.7)
POTASSIUM SERPL-SCNC: 3.9 MMOL/L (ref 3.5–5.3)
RBC # BLD AUTO: 3.52 MILLION/UL (ref 3.81–5.12)
SODIUM SERPL-SCNC: 138 MMOL/L (ref 135–147)
WBC # BLD AUTO: 9 THOUSAND/UL (ref 4.31–10.16)

## 2023-05-31 PROCEDURE — 80048 BASIC METABOLIC PNL TOTAL CA: CPT | Performed by: PHYSICIAN ASSISTANT

## 2023-05-31 PROCEDURE — 99232 SBSQ HOSP IP/OBS MODERATE 35: CPT | Performed by: NURSE PRACTITIONER

## 2023-05-31 PROCEDURE — 85027 COMPLETE CBC AUTOMATED: CPT | Performed by: PHYSICIAN ASSISTANT

## 2023-05-31 RX ORDER — PREDNISONE 20 MG/1
40 TABLET ORAL DAILY
Status: DISCONTINUED | OUTPATIENT
Start: 2023-05-31 | End: 2023-06-01 | Stop reason: HOSPADM

## 2023-05-31 RX ADMIN — LIDOCAINE 5% 2 PATCH: 700 PATCH TOPICAL at 09:06

## 2023-05-31 RX ADMIN — NYSTATIN: 100000 POWDER TOPICAL at 18:16

## 2023-05-31 RX ADMIN — GABAPENTIN 100 MG: 100 CAPSULE ORAL at 09:05

## 2023-05-31 RX ADMIN — CLOBETASOL PROPIONATE: 0.5 CREAM TOPICAL at 18:17

## 2023-05-31 RX ADMIN — GUAIFENESIN 600 MG: 600 TABLET, EXTENDED RELEASE ORAL at 09:05

## 2023-05-31 RX ADMIN — PREDNISONE 5 MG: 5 TABLET ORAL at 09:04

## 2023-05-31 RX ADMIN — POTASSIUM CHLORIDE 10 MEQ: 750 TABLET, EXTENDED RELEASE ORAL at 09:05

## 2023-05-31 RX ADMIN — Medication 1000 UNITS: at 09:05

## 2023-05-31 RX ADMIN — METOPROLOL TARTRATE 25 MG: 25 TABLET, FILM COATED ORAL at 09:05

## 2023-05-31 RX ADMIN — GABAPENTIN 300 MG: 300 CAPSULE ORAL at 21:36

## 2023-05-31 RX ADMIN — GUAIFENESIN 600 MG: 600 TABLET, EXTENDED RELEASE ORAL at 21:36

## 2023-05-31 RX ADMIN — METOPROLOL TARTRATE 25 MG: 25 TABLET, FILM COATED ORAL at 21:36

## 2023-05-31 RX ADMIN — HYDROXYCHLOROQUINE SULFATE 200 MG: 200 TABLET ORAL at 09:06

## 2023-05-31 RX ADMIN — ACETAMINOPHEN 650 MG: 325 TABLET ORAL at 04:37

## 2023-05-31 RX ADMIN — PREDNISONE 40 MG: 20 TABLET ORAL at 18:14

## 2023-05-31 RX ADMIN — APIXABAN 5 MG: 5 TABLET, FILM COATED ORAL at 18:14

## 2023-05-31 RX ADMIN — APIXABAN 5 MG: 5 TABLET, FILM COATED ORAL at 09:05

## 2023-05-31 RX ADMIN — CLOBETASOL PROPIONATE: 0.5 CREAM TOPICAL at 11:44

## 2023-05-31 RX ADMIN — FUROSEMIDE 40 MG: 40 TABLET ORAL at 09:05

## 2023-05-31 RX ADMIN — PANTOPRAZOLE SODIUM 40 MG: 40 TABLET, DELAYED RELEASE ORAL at 04:37

## 2023-05-31 RX ADMIN — HYDROXYCHLOROQUINE SULFATE 200 MG: 200 TABLET ORAL at 18:14

## 2023-05-31 NOTE — OCCUPATIONAL THERAPY NOTE
Occupational Therapy Screen        Patient Name: Bela Mathis  LKXAV'E Date: 5/31/2023 05/31/23 0745   OT Last Visit   OT Visit Date 05/31/23   Note Type   Note type Screen     Pt admitted from [address]  According to chart review, pt is D at baseline for ADLs, IADLs, and require vamshi lift  Pt does not require acute OT services while in the hospital  OT will D/C pt from caseload  Thank you      Nasima Woodson OTR/L

## 2023-05-31 NOTE — PROGRESS NOTES
[facility]  Progress Note  Name: Yola Vanessa  MRN: [id number]  Unit/Bed#: -93 I Date of Admission: 5/26/2023   Date of Service: 5/31/2023 I Hospital Day: 5    Assessment/Plan   * Febrile illness  Assessment & Plan  · Patient with 5-6-day history of increasing lethargy, cough, reported fevers, from [place]  · CXR without acute abnormality to suggest pneumonia  · Negative urinalysis, COVID/flu/RSV negative, strep pneumo/Legionella negative  · Without abdominal pain nausea vomiting or diarrhea  · Negative Pro-Tapan x2  · Suspect viral upper respiratory infection  with reactive airway disease   · She does have significant psoriasis with an acute flare without evidence of superimposed cellulitis  · This could be a flare of her acute pustular psoriasis causing fevers? · Appreciate dermatology evaluation and input   · Patient also with increased joint pain, has not seen Rheum in a while per family, requested eval here however may or may not be able to be seen while admitted depending on rheum availability  This can and should be followed as an outpatient   · Speech therapy recommending regular with thin liquids   · Antibiotics have been discontinued due to negative procal x 2, procal did increase today   · Blood cultures negative to date   · Monitor for any additional spiking fever, last fever even 5/28   · We will check venous duplex left upper extremity due to swelling which is pending   · Resides at [place] , daughter does not want her to go back to [place], will discuss alternative rehab placement with case management    Chronic diastolic heart failure ([address] )  Assessment & Plan  Wt Readings from Last 3 Encounters:   05/30/23 115 kg (253 lb 3 2 oz)   12/16/22 120 kg (264 lb 15 9 oz)   12/08/22 120 kg (265 lb 9 6 oz)     · Difficult to assess volume status due to morbid obesity however family reporting some increased pedal edema on admission    Patient unsure if she has gained weight   · previous cardiac echo on 11/20/2022 with EF 55% and grade 2 diastolic dysfunction  ·   · Continue on oral Lasix 40 mg daily   · Monitor volume status daily, daily weight, I/O    Chronic kidney disease  Assessment & Plan  Lab Results   Component Value Date    CREATININE 1 46 (H) 05/31/2023    CREATININE 1 63 (H) 05/30/2023    CREATININE 1 50 (H) 05/28/2023    EGFR 35 05/31/2023    EGFR 30 05/30/2023    EGFR 34 05/28/2023     · Stage III CKD   · Monitor BMP daily    Localized swelling on left hand  Assessment & Plan  · Left hand swelling, possibly from psoriasis flare   · Pulses intact   · Venpus duplex without evidence of DVTs  · Xrays show a ligamentous disruption and swelling   Could be due to her RA  · Would advise outpatient rheum and ortho/hand surgeon follow up for this     Morbid obesity ([facility] )  Assessment & Plan  · Morbid obesity complicates all facets of care , BMI >44 noted    Lumbar radiculopathy  Assessment & Plan  · Does report increased joint pain specifically right hip/leg and left arm , without any significant back pain at this time, continue gabapentin    Paroxysmal atrial fibrillation (HCC)  Assessment & Plan  · Rate controlled on Lopressor 25 mg every 12 hours   · anticoagulated on Eliquis 5 mg twice     Pustular psoriasis  Assessment & Plan  · Patient with biopsy-proven pustular psoriasis, worsening rash for about 1 week extending up to the neck  · She remains on her baseline prednisone 5 mg twice daily  · Appreciate dermatology evaluation and input   · Will focus on topical care for now and should she follow up with derm as they recommend, there may be other treatment options to offer     Sepsis [facility])  Assessment & Plan  · Suspecting viral sepsis present on admission due to suspected viral upper respiratory tract infection as evidenced by fever, tachycardia, tachypnea  · Could be fever due to rheumatologic illnesses as well given not source clearly identified with work up · Blood cultures negative to date  · COVID/flu/RSV negative  · Plan above    URTI (acute upper respiratory infection)  Assessment & Plan  · Likely viral upper respiratory tract infection, continue supportive care  · she remains on room air throughout hospital course    Rheumatoid arthritis ([address] )  Assessment & Plan  · Maintained on Plaquenil 200 mg twice daily and prednisone 5 mg twice daily although she has not followed with rheumatology in a long time  · She does report increased joint pain,  · Have placed a rheumatology evaluation request for while she's admitted  If this can not be done by the time she is deemed medically stable to transition her to outpatient care, then we will recommend she sees them in their office  · Will try to increase her steroids to 40 mg daily for a few days to see if this lessens her pain and joint swelling     Metabolic encephalopathy-resolved as of 5/30/2023  Assessment & Plan  Likely secondary to viral illness, resolved now back to baseline  Continue to monitor mentation  Delirium precautions            VTE Pharmacologic Prophylaxis: VTE Score: 4 Moderate Risk (Score 3-4) - Pharmacological DVT Prophylaxis Ordered: apixaban (Eliquis)  Patient Centered Rounds: I performed bedside rounds with nursing staff today  Discussions with Specialists or Other Care Team Provider: Primary RN and CM    Education and Discussions with Family / Patient: Updated  (daughter) via phone  Total Time Spent on Date of Encounter in care of patient: 35 minutes This time was spent on one or more of the following: performing physical exam; counseling and coordination of care; obtaining or reviewing history; documenting in the medical record; reviewing/ordering tests, medications or procedures; communicating with other healthcare professionals and discussing with patient's family/caregivers      Current Length of Stay: 5 day(s)  Current Patient Status: Inpatient   Certification Statement: The patient will continue to require additional inpatient hospital stay due to febrile illness due to rheumatologic illness   Discharge Plan: Anticipate discharge tomorrow to rehab facility  Code Status: Level 1 - Full Code    Subjective:   Patient complains of left hand swelling and does not recall any injuries  She has mobility but it's painful  Objective:     Vitals:   Temp (24hrs), Av °F (37 2 °C), Min:98 4 °F (36 9 °C), Max:99 6 °F (37 6 °C)    Temp:  [98 4 °F (36 9 °C)-99 6 °F (37 6 °C)] 98 4 °F (36 9 °C)  HR:  [95-98] 95  Resp:  [17] 17  BP: (102-131)/(53-60) 131/60  SpO2:  [96 %] 96 %  Body mass index is 44 85 kg/m²  Input and Output Summary (last 24 hours): Intake/Output Summary (Last 24 hours) at 2023 1512  Last data filed at 2023 1100  Gross per 24 hour   Intake 1078 ml   Output --   Net 1078 ml       Physical Exam:   Physical Exam  Constitutional:       Appearance: She is obese  HENT:      Mouth/Throat:      Mouth: Mucous membranes are moist    Cardiovascular:      Rate and Rhythm: Normal rate and regular rhythm  Heart sounds: No murmur heard  Pulmonary:      Effort: Pulmonary effort is normal       Breath sounds: Normal breath sounds  Abdominal:      General: Abdomen is flat  Musculoskeletal:      Comments: Left hand swelling noted, reduced mobility    Skin:     General: Skin is warm  Comments: Flaking dry skin patches all over her body    Neurological:      General: No focal deficit present  Mental Status: She is oriented to person, place, and time     Psychiatric:         Mood and Affect: Mood normal             Additional Data:     Labs:  Results from last 7 days   Lab Units 23  0613 23  0504   EOS PCT %  --  3   HEMATOCRIT % 33 0* 34 9   HEMOGLOBIN g/dL 9 9* 11 0*   LYMPHS PCT %  --  10*   MONOS PCT %  --  7   NEUTROS PCT %  --  79*   PLATELETS Thousands/uL 219 213   WBC Thousand/uL 9 00 8 68     Results from last 7 days   Lab Units 05/31/23  0613 05/30/23  0504   ANION GAP mmol/L 3* 2*   ALBUMIN g/dL  --  2 2*   ALK PHOS U/L  --  63   ALT U/L  --  42   AST U/L  --  43   BUN mg/dL 34* 29*   CALCIUM mg/dL 9 0 9 1   CHLORIDE mmol/L 107 103   CO2 mmol/L 28 29   CREATININE mg/dL 1 46* 1 63*   GLUCOSE RANDOM mg/dL 82 82   POTASSIUM mmol/L 3 9 4 2   SODIUM mmol/L 138 134*   TOTAL BILIRUBIN mg/dL  --  0 44     Results from last 7 days   Lab Units 05/26/23 2109   INR  1 88*             Results from last 7 days   Lab Units 05/30/23  0504 05/27/23  0512 05/26/23 2109   LACTIC ACID mmol/L  --   --  1 6   PROCALCITONIN ng/ml 0 59* 0 15 0 16       Lines/Drains:  Invasive Devices     Peripheral Intravenous Line  Duration           Peripheral IV 05/28/23 Right;Ventral (anterior) Wrist 2 days          Drain  Duration           External Urinary Catheter 3 days                      Imaging: Reviewed radiology reports from this admission including: xray(s)    Recent Cultures (last 7 days):   Results from last 7 days   Lab Units 05/27/23  0042 05/26/23 2109   BLOOD CULTURE   --  No Growth After 4 Days  No Growth After 4 Days     LEGIONELLA URINARY ANTIGEN  Negative  --        Last 24 Hours Medication List:   Current Facility-Administered Medications   Medication Dose Route Frequency Provider Last Rate   • acetaminophen  650 mg Oral Q4H PRN Shukri Oxford, DO     • albuterol  2 puff Inhalation Q6H PRN Shukri Oxford, DO     • apixaban  5 mg Oral BID Shukri Oxford, DO     • cholecalciferol  1,000 Units Oral Daily [facility], DO     • clobetasol   Topical BID Milta Ruth, DO     • furosemide  40 mg Oral Daily [facility], DO     • gabapentin  100 mg Oral Daily [facility], DO     • gabapentin  300 mg Oral HS Shukri Peru, DO     • guaiFENesin  600 mg Oral Q12H [address] [facility], DO     • guaiFENesin  200 mg Oral Q4H PRN Tatiana Barber PA-C     • hydrocortisone   Topical 4x Daily PRN Shukri Oxford, DO     • hydroxychloroquine  200 mg Oral BID [place] Rubins, DO     • lidocaine  2 patch Topical Daily [place] Rub[facility], DO     • metoprolol tartrate  25 mg Oral Q12H [facility] Princess Rubins, DO     • nystatin   Topical BID Princess Rubins, DO     • ondansetron  4 mg Intravenous Q6H PRN Princess Rubins, DO     • pantoprazole  40 mg Oral Early Morning Princess Rubins, DO     • potassium chloride  10 mEq Oral Daily [place] Rubins, DO     • predniSONE  40 mg Oral Daily AZIZA Flores     • senna-docusate sodium  1 tablet Oral BID Gertrudis Ket, DO          Today, Patient Was Seen By: AZIZA Flores    **Please Note: This note may have been constructed using a voice recognition system  **

## 2023-05-31 NOTE — ASSESSMENT & PLAN NOTE
· Maintained on Plaquenil 200 mg twice daily and prednisone 5 mg twice daily although she has not followed with rheumatology in a long time  · She does report increased joint pain,  · Have placed a rheumatology evaluation request for while she's admitted  If this can not be done by the time she is deemed medically stable to transition her to outpatient care, then we will recommend she sees them in their office     · Will try to increase her steroids to 40 mg daily for a few days to see if this lessens her pain and joint swelling

## 2023-05-31 NOTE — ASSESSMENT & PLAN NOTE
· Patient with 5-6-day history of increasing lethargy, cough, reported fevers, from [place]  · CXR without acute abnormality to suggest pneumonia  · Negative urinalysis, COVID/flu/RSV negative, strep pneumo/Legionella negative  · Without abdominal pain nausea vomiting or diarrhea  · Negative Pro-Tapan x2  · Suspect viral upper respiratory infection  with reactive airway disease   · She does have significant psoriasis with an acute flare without evidence of superimposed cellulitis  · This could be a flare of her acute pustular psoriasis causing fevers? · Appreciate dermatology evaluation and input   · Patient also with increased joint pain, has not seen Rheum in a while per family, requested eval here however may or may not be able to be seen while admitted depending on rheum availability   This can and should be followed as an outpatient   · Speech therapy recommending regular with thin liquids   · Antibiotics have been discontinued due to negative procal x 2, procal did increase today   · Blood cultures negative to date   · Monitor for any additional spiking fever, last fever even 5/28   · We will check venous duplex left upper extremity due to swelling which is pending   · Resides at [facility] , daughter does not want her to go back to [facility], will discuss alternative rehab placement with case management

## 2023-05-31 NOTE — ASSESSMENT & PLAN NOTE
Wt Readings from Last 3 Encounters:   05/30/23 115 kg (253 lb 3 2 oz)   12/16/22 120 kg (264 lb 15 9 oz)   12/08/22 120 kg (265 lb 9 6 oz)     · Difficult to assess volume status due to morbid obesity however family reporting some increased pedal edema on admission    Patient unsure if she has gained weight   · previous cardiac echo on 11/20/2022 with EF 55% and grade 2 diastolic dysfunction  ·   · Continue on oral Lasix 40 mg daily   · Monitor volume status daily, daily weight, I/O

## 2023-05-31 NOTE — ASSESSMENT & PLAN NOTE
· Patient with biopsy-proven pustular psoriasis, worsening rash for about 1 week extending up to the neck  · She remains on her baseline prednisone 5 mg twice daily  · Appreciate dermatology evaluation and input   · Will focus on topical care for now and should she follow up with derm as they recommend, there may be other treatment options to offer

## 2023-05-31 NOTE — ASSESSMENT & PLAN NOTE
· Left hand swelling, possibly from psoriasis flare   · Pulses intact   · Venpus duplex without evidence of DVTs  · Xrays show a ligamentous disruption and swelling   Could be due to her RA  · Would advise outpatient rheum and ortho/hand surgeon follow up for this

## 2023-05-31 NOTE — ASSESSMENT & PLAN NOTE
Lab Results   Component Value Date    CREATININE 1 46 (H) 05/31/2023    CREATININE 1 63 (H) 05/30/2023    CREATININE 1 50 (H) 05/28/2023    EGFR 35 05/31/2023    EGFR 30 05/30/2023    EGFR 34 05/28/2023     · Stage III CKD   · Monitor BMP daily

## 2023-05-31 NOTE — SPEECH THERAPY NOTE
Speech Language/Pathology  Attempted f/u with pt - lunch completed, no desire for additional trials w/ po  Staff report not gross coughing w/ the po but not noted  Will follow as able

## 2023-06-01 VITALS
RESPIRATION RATE: 19 BRPM | HEART RATE: 84 BPM | BODY MASS INDEX: 45.7 KG/M2 | WEIGHT: 258 LBS | DIASTOLIC BLOOD PRESSURE: 48 MMHG | OXYGEN SATURATION: 97 % | SYSTOLIC BLOOD PRESSURE: 127 MMHG | TEMPERATURE: 98.5 F

## 2023-06-01 LAB
BACTERIA BLD CULT: NORMAL
BACTERIA BLD CULT: NORMAL

## 2023-06-01 PROCEDURE — 99239 HOSP IP/OBS DSCHRG MGMT >30: CPT | Performed by: NURSE PRACTITIONER

## 2023-06-01 RX ORDER — CLOBETASOL PROPIONATE 0.5 MG/G
CREAM TOPICAL 2 TIMES DAILY
Qty: 60 G | Refills: 0 | Status: SHIPPED | OUTPATIENT
Start: 2023-06-01

## 2023-06-01 RX ORDER — PREDNISONE 20 MG/1
40 TABLET ORAL DAILY
Qty: 6 TABLET | Refills: 0 | Status: SHIPPED | OUTPATIENT
Start: 2023-06-01 | End: 2023-06-04

## 2023-06-01 RX ORDER — PREDNISONE 5 MG/1
5 TABLET ORAL 2 TIMES DAILY WITH MEALS
Qty: 60 TABLET | Refills: 0 | Status: SHIPPED | OUTPATIENT
Start: 2023-06-05

## 2023-06-01 RX ADMIN — GABAPENTIN 100 MG: 100 CAPSULE ORAL at 10:05

## 2023-06-01 RX ADMIN — ACETAMINOPHEN 650 MG: 325 TABLET ORAL at 15:37

## 2023-06-01 RX ADMIN — PREDNISONE 40 MG: 20 TABLET ORAL at 10:05

## 2023-06-01 RX ADMIN — LIDOCAINE 5% 2 PATCH: 700 PATCH TOPICAL at 10:05

## 2023-06-01 RX ADMIN — CLOBETASOL PROPIONATE: 0.5 CREAM TOPICAL at 10:05

## 2023-06-01 RX ADMIN — PANTOPRAZOLE SODIUM 40 MG: 40 TABLET, DELAYED RELEASE ORAL at 06:22

## 2023-06-01 RX ADMIN — Medication 1000 UNITS: at 10:04

## 2023-06-01 RX ADMIN — NYSTATIN: 100000 POWDER TOPICAL at 10:05

## 2023-06-01 RX ADMIN — APIXABAN 5 MG: 5 TABLET, FILM COATED ORAL at 10:05

## 2023-06-01 RX ADMIN — METOPROLOL TARTRATE 25 MG: 25 TABLET, FILM COATED ORAL at 10:05

## 2023-06-01 RX ADMIN — GUAIFENESIN 600 MG: 600 TABLET, EXTENDED RELEASE ORAL at 10:05

## 2023-06-01 RX ADMIN — HYDROXYCHLOROQUINE SULFATE 200 MG: 200 TABLET ORAL at 10:05

## 2023-06-01 RX ADMIN — POTASSIUM CHLORIDE 10 MEQ: 750 TABLET, EXTENDED RELEASE ORAL at 10:05

## 2023-06-01 RX ADMIN — FUROSEMIDE 40 MG: 40 TABLET ORAL at 10:05

## 2023-06-01 NOTE — NURSING NOTE
Report was called to nurse Gualberto Briones at [facility] and transfer summary faxed to same  Callback number left with Gualberto Briones at [facility] in case of any further questions

## 2023-06-01 NOTE — ASSESSMENT & PLAN NOTE
· Suspecting viral sepsis present on admission due to suspected viral upper respiratory tract infection as evidenced by fever, tachycardia, tachypnea  · Could be fever due to rheumatologic illnesses as well given not source clearly identified with work up   · Blood cultures negative to date  · COVID/flu/RSV negative  · Supportive measures

## 2023-06-01 NOTE — ASSESSMENT & PLAN NOTE
Lab Results   Component Value Date    CREATININE 1 46 (H) 05/31/2023    CREATININE 1 63 (H) 05/30/2023    CREATININE 1 50 (H) 05/28/2023    EGFR 35 05/31/2023    EGFR 30 05/30/2023    EGFR 34 05/28/2023     · Stage III CKD   · Creatinine stable

## 2023-06-01 NOTE — CASE MANAGEMENT
Case Management Discharge Planning Note    Patient name Cyndee Fink /-22 MRN [id number]  : 1950 Date 2023       Current Admission Date: 2023  Current Admission Diagnosis:Febrile illness   Patient Active Problem List    Diagnosis Date Noted   • Localized swelling on left hand 2023   • URTI (acute upper respiratory infection) 2023   • Chronic kidney disease 2023   • Febrile illness 2023   • Dermatitis associated with incontinence 2023   • Right shoulder pain 12/15/2022   • Abnormal urinalysis 2022   • Ambulatory dysfunction 2022   • Hyperuricemia 2022   • Chronic diastolic heart failure ([address] ) 2022   • Acute bronchitis 2022   • Assistance needed with transportation 09/15/2022   • Abnormal CT of the chest 2022   • Gram-positive cocci bacteremia 2022   • Pustular psoriasis 2022   • Elevated troponin 2022   • COVID-19 2022   • Paroxysmal atrial fibrillation ([address] ) 01/10/2022   • Sepsis ([address] ) 01/10/2022   • Hyperparathyroidism ([address] ) 2021   • Murmur, cardiac 2021   • BPPV (benign paroxysmal positional vertigo) 2018   • Seasonal allergic rhinitis due to pollen 2018   • Lower extremity cellulitis 10/27/2017   • Osteoporosis 2016   • SIRS (systemic inflammatory response syndrome) ([address] ) 2016   • Rheumatoid arthritis ([address] ) 2016   • GERD (gastroesophageal reflux disease) 2016   • Sarcoid 2016   • Morbid obesity ([address] ) 2016   • Vitamin D deficiency 2015   • Benign essential hypertension 2014   • Lumbar radiculopathy 2014      LOS (days): 6  Geometric Mean LOS (GMLOS) (days): 5 00  Days to GMLOS:-0 5     OBJECTIVE:  Risk of Unplanned Readmission Score: 25 24         Current admission status: Inpatient   Preferred Pharmacy:   [address] [place] [address] [id number] Vani Ca, [address] [place] [id number]  Phone: [phone] Fax: [phone], Roderick Arora  [facility] [address]  Phone: [phone] Fax: [phone]    Primary Care Provider: Paulina Irizarry DO    Primary Insurance: MEDICARE  Secondary Insurance: [employer]    DISCHARGE DETAILS:    Discharge planning discussed with[de-identified] Carlos Senior via phone and Patient  Freedom of Choice: Yes  Comments - [place] of Choice: [place] reserved  CM contacted family/caregiver?: Yes  Were Treatment Team discharge recommendations reviewed with patient/caregiver?: Yes  Did patient/caregiver verbalize understanding of patient care needs?: Yes  Were patient/caregiver advised of the risks associated with not following Treatment Team discharge recommendations?: Yes    Contacts  Patient Contacts: Yaneli Ortiz, daughter  Relationship to Patient[de-identified] Family  Contact Method: Phone  Phone Number: [phone]  Reason/Outcome: Discharge Planning       Other Referral/Resources/Interventions Provided:  Interventions: Facility Return  Referral Comments: CM spoke with daughter Ivonne Senior via phone to let her know that patient is ready for discharge  IMM form explained and understood, she is agreeable for her return to [address] [place] [address] 100 today  Treatment Team Recommendation: Facility Return, SNF  Discharge Destination Plan[de-identified] Facility Return, SNF      IMM Given (Date):: 06/01/23  IMM Given to[de-identified] Patient   IMM reviewed with patient and caregiver, patient and caregiver agrees with discharge determination  Family notified[de-identified] Ivonne Senior     Jas Holguin CM reviewed d/c planning process including the following: identifying help at home, patient preference for d/c planning needs, Discharge Lounge, [facility] Meds to Bed program, availability of treatment team to discuss questions or concerns patient and/or family may have regarding understanding medications and recognizing signs and symptoms once discharged    CM also encouraged patient to follow up with all recommended appointments after discharge  Patient advised of importance for patient and family to participate in managing patient’s medical well being

## 2023-06-01 NOTE — DISCHARGE SUMMARY
[facility]  Discharge- Georgi Perez 1950, 74 y o  female MRN: [id number]  Unit/Bed#: -01 Encounter: [id number]  Primary Care Provider: Minna Hammonds DO   Date and time admitted to hospital: 5/26/2023  7:37 PM    * Febrile illness  Assessment & Plan  · Patient with 5-6-day history of increasing lethargy, cough, reported fevers, from [place]  · CXR without acute abnormality to suggest pneumonia  · Negative urinalysis, COVID/flu/RSV negative, strep pneumo/Legionella negative  · Without abdominal pain nausea vomiting or diarrhea  · Negative Pro-Tapan x2  · Suspect viral upper respiratory infection  with reactive airway disease   · She does have significant psoriasis with an acute flare without evidence of superimposed cellulitis  · This could be a flare of her acute pustular psoriasis causing fevers  · Appreciate dermatology evaluation and input   · Patient also with increased joint pain, has not seen Rheum in a while per family, requested eval here however rheumatology advised that the patient should be followed up in their office after discharge for management of her conditions  Discussed in detail with the patient's daughter Sayda Foot  · Speech therapy recommending regular with thin liquids   · Antibiotics have been discontinued due to negative procal x 2  · Blood cultures negative to date   · Monitor for any additional spiking fever, last fever even 5/28   · Medically stable for transfer back to [address] [place] today  Daughter is aware that she will need to go back there         Chronic diastolic heart failure (HCC)  Assessment & Plan  Wt Readings from Last 3 Encounters:   06/01/23 117 kg (258 lb)   12/16/22 120 kg (264 lb 15 9 oz)   12/08/22 120 kg (265 lb 9 6 oz)     · Difficult to assess volume status due to morbid obesity however she appears euvolemic on exam today   · previous cardiac echo on 11/20/2022 with EF 55% and grade 2 diastolic dysfunction  ·   · Continue on oral Lasix 40 mg daily       Chronic kidney disease  Assessment & Plan  Lab Results   Component Value Date    CREATININE 1 46 (H) 05/31/2023    CREATININE 1 63 (H) 05/30/2023    CREATININE 1 50 (H) 05/28/2023    EGFR 35 05/31/2023    EGFR 30 05/30/2023    EGFR 34 05/28/2023     · Stage III CKD   · Creatinine stable     Localized swelling on left hand  Assessment & Plan  · Left hand swelling, possibly from psoriasis flare and RA  · Venpus duplex without evidence of DVTs  · Xrays show a ligamentous disruption and swelling  Could be due to her RA  · Would advise outpatient rheum and ortho/hand surgeon follow up for this     Morbid obesity ([facility] [address] )  Assessment & Plan  · Morbid obesity complicates all facets of care , BMI >44 noted    Lumbar radiculopathy  Assessment & Plan  · Does report increased joint pain specifically right hip/leg and left arm , without any significant back pain at this time, continue gabapentin    Paroxysmal atrial fibrillation (HCC)  Assessment & Plan  · Rate controlled on Lopressor 25 mg every 12 hours   · anticoagulated on Eliquis 5 mg twice     Pustular psoriasis  Assessment & Plan  · Patient with biopsy-proven pustular psoriasis, worsening rash for about 1 week extending up to the neck  · She remains on her baseline prednisone 5 mg twice daily but given her hand swelling and joint pain, have her on a trial of increased steroids for the next 5 days to see if it improves her pain  Then she can go back to her normal 5mg BID     · Appreciate dermatology evaluation and input   · Will focus on topical care for now and should she follow up with derm as they recommend, there may be other treatment options to offer     Sepsis [facility])  Assessment & Plan  · Suspecting viral sepsis present on admission due to suspected viral upper respiratory tract infection as evidenced by fever, tachycardia, tachypnea  · Could be fever due to rheumatologic illnesses as well given not source clearly identified with work up · Blood cultures negative to date  · COVID/flu/RSV negative  · Supportive measures     URTI (acute upper respiratory infection)  Assessment & Plan  · Likely viral upper respiratory tract infection, continue supportive care  · she remains on room air throughout hospital course    Rheumatoid arthritis ([address] )  Assessment & Plan  · Maintained on Plaquenil 200 mg twice daily and prednisone 5 mg twice daily although she has not followed with rheumatology in a long time  · She does report increased joint pain,  · She is deemed medically stable to transition her to outpatient care, therefore we will recommend she sees rheum in their office  · Will try to increase her steroids to 40 mg daily for a few days to see if this lessens her pain and joint swelling (day 2/5)         Medical Problems     Resolved Problems  Date Reviewed: 6/1/2023          Resolved    Metabolic encephalopathy 5/01/3010     Resolved by  Tyler Powell PA-C        Discharging Physician / Practitioner: AZIZA Daniels  PCP: Marisa Flynn DO  Admission Date:   Admission Orders (From admission, onward)     Ordered        05/26/23 2258  INPATIENT ADMISSION  Once                      Discharge Date: 06/01/23    Consultations During Hospital Stay:  · Dermatology     Procedures Performed:   · X-ray of the left hand did not show any acute osseous abnormality but did show soft tissue swelling  There is also ligamentous disruption noted  · Chest x-ray did not show any acute cardiopulmonary findings  · Venous duplex of both arms did not show any DVTs    Significant Findings / Test Results:   · Pustule psoriatic flare    Incidental Findings:   · None  ·     Test Results Pending at Discharge (will require follow up):    · None     Outpatient Tests Requested:  · None    Complications: None    Reason for Admission: Fever and joint pain    Hospital Course:   Haider Apodaca is a 74 y o  female patient who originally presented to the hospital on 5/26/2023 due to fever and joint pain and foreign body psoriatic plaques  The patient is known to have pustular psoriasis  She does not have management of this or her RA  She came to the hospital with a URI as well as fever  After evaluation and complete infection work-up, the fever was likely secondary to inflammation from her psoriatic flare  She also has right knee pain and left hand pain which could be attributed to her rheumatoid arthritis  She has been on Plaquenil and 5 mg of prednisone twice daily for years for this  She was evaluated by dermatology who ordered topical treatment for her psoriasis however they have recommended on multiple occasions that she come to the office for better treatment modalities  This was discussed in detail with patient's daughter  As far as her RA, rheumatology would like to see her in the practice setting  We will try an increased dose of steroids for the next 4 days and then she will be transition back to prednisone 5 mg twice daily  She was given 40 mg daily starting May 31 and that can stop after the dose on June 4  On June 5 she can go back to her prednisone 5 mg twice daily  Please see above list of diagnoses and related plan for additional information  Condition at Discharge: stable    Discharge Day Visit / Exam:   Subjective:  Patient feeling unchanged today  She understands that she needs to get her  rheumatoid arthritis under control  She will agree to go see a dermatologist and rheumatologist when she is discharged  Vitals: Blood Pressure: 134/66 (06/01/23 0732)  Pulse: 75 (06/01/23 0732)  Temperature: 98 5 °F (36 9 °C) (06/01/23 0732)  Temp Source: Oral (06/01/23 0732)  Respirations: 19 (06/01/23 0732)  Weight - Scale: 117 kg (258 lb) (06/01/23 0600)  SpO2: 97 % (06/01/23 0732)  Exam:   Physical Exam  Constitutional:       Appearance: She is obese     HENT:      Mouth/Throat:      Mouth: Mucous membranes are moist    Cardiovascular: Rate and Rhythm: Normal rate and regular rhythm  Pulses: Normal pulses  Heart sounds: Normal heart sounds  Pulmonary:      Effort: Pulmonary effort is normal  No respiratory distress  Breath sounds: Normal breath sounds  Abdominal:      General: Abdomen is flat  Palpations: Abdomen is soft  Skin:     Comments: Plaques and scaling noted to patient's face, chest, back and thighs  She is erythema noted to her folds   Neurological:      General: No focal deficit present  Mental Status: She is oriented to person, place, and time  Psychiatric:         Mood and Affect: Mood normal          Behavior: Behavior normal           Discussion with Family: Updated  (daughter) via phone  Discharge instructions/Information to patient and family:   See after visit summary for information provided to patient and family  Provisions for Follow-Up Care:  See after visit summary for information related to follow-up care and any pertinent home health orders  Disposition:   [facility]  Readmission:Not anticipated     Discharge Statement:  I spent 42 minutes discharging the patient  This time was spent on the day of discharge  I had direct contact with the patient on the day of discharge  Greater than 50% of the total time was spent examining patient, answering all patient questions, arranging and discussing plan of care with patient as well as directly providing post-discharge instructions  Additional time then spent on discharge activities  Discharge Medications:  See after visit summary for reconciled discharge medications provided to patient and/or family        **Please Note: This note may have been constructed using a voice recognition system**

## 2023-06-01 NOTE — ASSESSMENT & PLAN NOTE
· Patient with biopsy-proven pustular psoriasis, worsening rash for about 1 week extending up to the neck  · She remains on her baseline prednisone 5 mg twice daily but given her hand swelling and joint pain, have her on a trial of increased steroids for the next 5 days to see if it improves her pain  Then she can go back to her normal 5mg BID     · Appreciate dermatology evaluation and input   · Will focus on topical care for now and should she follow up with derm as they recommend, there may be other treatment options to offer

## 2023-06-01 NOTE — ASSESSMENT & PLAN NOTE
· Patient with 5-6-day history of increasing lethargy, cough, reported fevers, from [place]  · CXR without acute abnormality to suggest pneumonia  · Negative urinalysis, COVID/flu/RSV negative, strep pneumo/Legionella negative  · Without abdominal pain nausea vomiting or diarrhea  · Negative Pro-Tapan x2  · Suspect viral upper respiratory infection  with reactive airway disease   · She does have significant psoriasis with an acute flare without evidence of superimposed cellulitis  · This could be a flare of her acute pustular psoriasis causing fevers  · Appreciate dermatology evaluation and input   · Patient also with increased joint pain, has not seen Rheum in a while per family, requested eval here however rheumatology advised that the patient should be followed up in their office after discharge for management of her conditions  Discussed in detail with the patient's daughter Domingo Amador  · Speech therapy recommending regular with thin liquids   · Antibiotics have been discontinued due to negative procal x 2  · Blood cultures negative to date   · Monitor for any additional spiking fever, last fever even 5/28   · Medically stable for transfer back to [address] today  Daughter is aware that she will need to go back there

## 2023-06-01 NOTE — ASSESSMENT & PLAN NOTE
· Maintained on Plaquenil 200 mg twice daily and prednisone 5 mg twice daily although she has not followed with rheumatology in a long time  · She does report increased joint pain,  · She is deemed medically stable to transition her to outpatient care, therefore we will recommend she sees rheum in their office     · Will try to increase her steroids to 40 mg daily for a few days to see if this lessens her pain and joint swelling (day 2/5)

## 2023-06-01 NOTE — ASSESSMENT & PLAN NOTE
Wt Readings from Last 3 Encounters:   06/01/23 117 kg (258 lb)   12/16/22 120 kg (264 lb 15 9 oz)   12/08/22 120 kg (265 lb 9 6 oz)     · Difficult to assess volume status due to morbid obesity however she appears euvolemic on exam today   · previous cardiac echo on 11/20/2022 with EF 55% and grade 2 diastolic dysfunction  ·   · Continue on oral Lasix 40 mg daily

## 2023-06-01 NOTE — ASSESSMENT & PLAN NOTE
· Left hand swelling, possibly from psoriasis flare and RA  · Venpus duplex without evidence of DVTs  · Xrays show a ligamentous disruption and swelling   Could be due to her RA  · Would advise outpatient rheum and ortho/hand surgeon follow up for this

## 2023-06-02 ENCOUNTER — TRANSITIONAL CARE MANAGEMENT (OUTPATIENT)
Dept: INTERNAL MEDICINE CLINIC | Facility: CLINIC | Age: 73
End: 2023-06-02

## 2023-06-13 ENCOUNTER — NURSING HOME VISIT (OUTPATIENT)
Dept: WOUND CARE | Facility: HOSPITAL | Age: 73
End: 2023-06-13

## 2023-06-13 DIAGNOSIS — R32 DERMATITIS ASSOCIATED WITH INCONTINENCE: Primary | ICD-10-CM

## 2023-06-13 DIAGNOSIS — L25.8 DERMATITIS ASSOCIATED WITH INCONTINENCE: Primary | ICD-10-CM

## 2023-06-13 NOTE — PROGRESS NOTES
[address] [facility]       Patient ID: Jo Benson is a 74 y o  female Date of Birth 1950     Location of Service: [place] [address]    Performed wound round with: Wound team     Chief Complaint : Left buttock    Wound Instructions:  Wound: Left buttock  Continue  vitamin A and D ointment  Turn and reposition frequently,   Increase protein intake  Monitor for any sign of infection or worsening, inform PCP or patient's primary physician in your facility  Allergies  Methotrexate derivatives, Amoxicillin, Ampicillin-sulbactam sodium, and Shellfish-derived products - food allergy      Assessment & Plan:  1  Dermatitis associated with incontinence  Assessment & Plan:  Buttocks  Patient is incontinent of both bladder and bowel, use of diaper is not a contributing factor  The wound is healed  Continue Vit A and D ointment  Sign off             Subjective:   June 13, 2023  New consult for wound on the left buttock  Received patient in bed, seems comfortable  Denies pain  As per report, patient had flareup of her psoriasis and what needed to send out to acute care  When she came back from acute care, she had a wound on the left buttock  Current wound treatment is vitamin A and D ointment  Review of Systems   Constitutional: Negative  HENT: Negative  Eyes: Negative  Respiratory: Negative  Cardiovascular: Negative for chest pain and leg swelling  Gastrointestinal: Negative  Endocrine: Negative  Genitourinary: Negative  Musculoskeletal: Positive for gait problem  Skin: Positive for wound  See HPI   Neurological: Negative for dizziness and headaches  Psychiatric/Behavioral: Negative for behavioral problems  Objective:    Physical Exam  Constitutional:       Appearance: Normal appearance  Pulmonary:      Effort: Pulmonary effort is normal    Abdominal:      Tenderness: There is no abdominal tenderness   There is no guarding  Genitourinary:     Comments: incontinent  Musculoskeletal:         General: No tenderness  Cervical back: Normal range of motion  Right lower leg: No edema  Left lower leg: No edema  Comments: LROM   Skin:     Findings: No lesion  Comments: Wound on the left buttock is healed   Neurological:      Mental Status: She is alert  Gait: Gait abnormal    Psychiatric:         Mood and Affect: Mood normal               Procedures           Patient's care was coordinated with nursing facility staff  Recent vitals, labs and updated medications were reviewed on EMR or chart system of facility  Past Medical, surgical, social, medication and allergy history and patient's previous records were reviewed and updated as appropriate: Most up-to date information is available in the facility EMR where the patient is currently admitted      Patient Active Problem List   Diagnosis   • SIRS (systemic inflammatory response syndrome) ([facility])   • Rheumatoid arthritis ([facility])   • GERD (gastroesophageal reflux disease)   • Sarcoid   • Benign essential hypertension   • Morbid obesity ([facility] )   • Lumbar radiculopathy   • Osteoporosis   • Vitamin D deficiency   • Lower extremity cellulitis   • BPPV (benign paroxysmal positional vertigo)   • Seasonal allergic rhinitis due to pollen   • Hyperparathyroidism ([facility] )   • Murmur, cardiac   • Paroxysmal atrial fibrillation ([facility])   • Sepsis ([facility] )   • COVID-19   • Elevated troponin   • Gram-positive cocci bacteremia   • Pustular psoriasis   • Abnormal CT of the chest   • Assistance needed with transportation   • Acute bronchitis   • Chronic diastolic heart failure ([facility])   • Hyperuricemia   • Ambulatory dysfunction   • Abnormal urinalysis   • Right shoulder pain   • Dermatitis associated with incontinence   • Febrile illness   • Chronic kidney disease   • URTI (acute upper respiratory infection)   • Localized swelling on left hand     Past Medical History:   Diagnosis Date "  • Abnormal thyroid function test     last assessed: 2015    • Arthritis    • Caries     last assessed: 2016    • Continuous opioid dependence ([facility] [address] ) 2021   • Edema of right lower extremity     last assessed: 2015    • GERD (gastroesophageal reflux disease)    • Hypertension    • Medicare annual wellness visit, subsequent 2021   • Positive blood culture 3/11/2022   • Sarcoid      Past Surgical History:   Procedure Laterality Date   •  SECTION     • MULTIPLE TOOTH EXTRACTIONS N/A 2016    Procedure: Surgical extraction of teeth 2, 18, 19, 30, 31; incision and drainage of left subperiosteal abscess ;  Surgeon: Chaim Rossi DMD;  Location: [facility];  Service:      Social History     Socioeconomic History   • Marital status: Single     Spouse name: None   • Number of children: None   • Years of education: None   • Highest education level: None   Occupational History   • Occupation: Retired: Worked for telephone company    Tobacco Use   • Smoking status: Never   • Smokeless tobacco: Never   Vaping Use   • Vaping Use: Never used   Substance and Sexual Activity   • Alcohol use: Not Currently   • Drug use: No   • Sexual activity: Not Currently   Other Topics Concern   • None   Social History Narrative     noted in \"allscripts\"      Social Determinants of Health     Financial Resource Strain: Not on file   Food Insecurity: No Food Insecurity (2023)    Hunger Vital Sign    • Worried About Running Out of Food in the Last Year: Never true    • Ran Out of Food in the Last Year: Never true   Transportation Needs: No Transportation Needs (2023)    PRAPARE - Transportation    • Lack of Transportation (Medical): No    • Lack of Transportation (Non-Medical):  No   Physical Activity: Not on file   Stress: Not on file   Social Connections: Not on file   Intimate Partner Violence: Not on file   Housing Stability: Low Risk  (2023)    Housing Stability Vital " Sign    • Unable to Pay for Housing in the Last Year: No    • Number of Places Lived in the Last Year: 1    • Unstable Housing in the Last Year: No        Current Outpatient Medications:   •  acetaminophen (TYLENOL) 325 mg tablet, Take 2 tablets (650 mg total) by mouth every 4 (four) hours as needed for mild pain, headaches or fever  , Disp: 30 tablet, Rfl: 0  •  albuterol (Ventolin HFA) 90 mcg/act inhaler, Inhale 2 puffs every 6 (six) hours as needed for wheezing, Disp: 18 g, Rfl: 0  •  alendronate (FOSAMAX) 70 mg tablet, Take by mouth every 7 days , Disp: , Rfl:   •  apixaban (ELIQUIS) 5 mg, Take 1 tablet (5 mg total) by mouth 2 (two) times a day, Disp: 60 tablet, Rfl: 0  •  cholecalciferol (VITAMIN D3) 1,000 units tablet, Take 1 tablet (1,000 Units total) by mouth daily, Disp: 90 tablet, Rfl: 0  •  clobetasol (TEMOVATE) 0 05 % cream, Apply topically 2 (two) times a day, Disp: 60 g, Rfl: 0  •  Clobetasol Propionate E 0 05 % emollient cream, APPLY TWICE A DAY FOR PALM SKIN DERMITITS, Disp: , Rfl:   •  furosemide (LASIX) 40 mg tablet, Take 1 tablet (40 mg total) by mouth daily, Disp: 30 tablet, Rfl: 0  •  gabapentin (Neurontin) 100 mg capsule, Take 1 capsule in AM and 3 capsules in PM, Disp: 120 capsule, Rfl: 1  •  hydrocortisone 2 5 % ointment, Apply topically 4 (four) times a day as needed for irritation, Disp: 30 g, Rfl: 0  •  hydroxychloroquine (PLAQUENIL) 200 mg tablet, Take 1 tablet (200 mg total) by mouth 2 (two) times a day, Disp: 60 tablet, Rfl: 0  •  ketoconazole (NIZORAL) 2 % cream, Apply topically 2 (two) times a day, Disp: 60 g, Rfl: 0  •  lidocaine (LIDODERM) 5 %, Apply 2 patches topically daily Remove & Discard patch within 12 hours or as directed by MD Do not start before December 20, 2022 , Disp: , Rfl: 0  •  metoprolol tartrate (LOPRESSOR) 25 mg tablet, Take 1 tablet (25 mg total) by mouth every 12 (twelve) hours, Disp: 180 tablet, Rfl: 0  •  nystatin (MYCOSTATIN) powder, Apply topically 2 (two) "times a day, Disp: 15 g, Rfl: 0  •  omeprazole (PriLOSEC) 20 mg delayed release capsule, Take 1 capsule (20 mg total) by mouth daily, Disp: 90 capsule, Rfl: 3  •  polyethylene glycol (MIRALAX) 17 g packet, Take 17 g by mouth daily Do not start before November 26, 2022 , Disp: 510 g, Rfl: 0  •  potassium chloride (K-DUR,KLOR-CON) 10 mEq tablet, Take 1 tablet (10 mEq total) by mouth daily, Disp: 90 tablet, Rfl: 0  •  predniSONE 5 mg tablet, Take 1 tablet (5 mg total) by mouth 2 (two) times a day with meals Do not start before June 5, 2023 , Disp: 60 tablet, Rfl: 0  •  senna-docusate sodium (SENOKOT S) 8 6-50 mg per tablet, Take 1 tablet by mouth daily, Disp: 30 tablet, Rfl: 0  •  triamcinolone (KENALOG) 0 1 % cream, Apply 1 application topically 2 (two) times a day for 14 days To affected area, Disp: 28 g, Rfl: 0  •  white petrolatum-mineral oil (EUCERIN,HYDROCERIN) cream, Apply topically 3 (three) times a day, Disp: 454 g, Rfl: 0  Family History   Problem Relation Age of Onset   • Asthma Mother    • Stroke Mother    • No Known Problems Father    • No Known Problems Sister    • No Known Problems Brother    • No Known Problems Maternal Grandmother    • No Known Problems Maternal Grandfather    • No Known Problems Paternal Grandmother    • No Known Problems Paternal Grandfather    • Diabetes Maternal Aunt    • Breast cancer Cousin    • Diabetes Cousin    • Breast cancer Other               Coordination of Care: Wound team aware of the treatment plan  Facility nurse will provide wound treatment and monitor the wound for any changes  Patient / Staff education : Patient / Staff was given education on sign of infection and pressure ulcer prevention  Patient/ Staff verbalized understanding     Follow up :  Sign off    Voice-recognition software may have been used in the preparation of this document   Occasional wrong word or \"sound-alike\" substitutions may have occurred due to the inherent limitations of voice recognition " software  Interpretation should be guided by context        AZIZA Kendrick

## 2023-06-14 NOTE — ASSESSMENT & PLAN NOTE
Buttocks  Patient is incontinent of both bladder and bowel, use of diaper is not a contributing factor  The wound is healed  Continue Vit A and D ointment  Sign off

## 2023-07-31 PROBLEM — A41.9 SEPSIS (HCC): Status: RESOLVED | Noted: 2022-01-10 | Resolved: 2023-07-31

## 2023-07-31 PROBLEM — J06.9 URTI (ACUTE UPPER RESPIRATORY INFECTION): Status: RESOLVED | Noted: 2023-05-29 | Resolved: 2023-07-31

## 2023-08-22 ENCOUNTER — TELEPHONE (OUTPATIENT)
Dept: DERMATOLOGY | Facility: CLINIC | Age: 73
End: 2023-08-22

## 2023-08-22 NOTE — TELEPHONE ENCOUNTER
Rec'd call from [facility] at [facility] requesting return call to [phone]. She stated that patient needs to be scheduled for hospital-follow up - she's having a dermatitis flare. I confirmed - hospital follow up from LAST YEAR? She states yes. Patient was scheduled with Dr. Tanner Able 2/4/2022 but didn't keep appointment. Johnny - please contact Saw to schedule patient on a Friday at [facility].

## 2023-10-06 ENCOUNTER — OFFICE VISIT (OUTPATIENT)
Dept: RHEUMATOLOGY | Facility: CLINIC | Age: 73
End: 2023-10-06
Payer: MEDICARE

## 2023-10-06 VITALS
DIASTOLIC BLOOD PRESSURE: 84 MMHG | BODY MASS INDEX: 45.71 KG/M2 | SYSTOLIC BLOOD PRESSURE: 134 MMHG | WEIGHT: 258 LBS | HEIGHT: 63 IN

## 2023-10-06 DIAGNOSIS — M06.09 RHEUMATOID ARTHRITIS OF MULTIPLE SITES WITH NEGATIVE RHEUMATOID FACTOR (HCC): Primary | ICD-10-CM

## 2023-10-06 DIAGNOSIS — M17.0 PRIMARY OSTEOARTHRITIS OF BOTH KNEES: ICD-10-CM

## 2023-10-06 PROCEDURE — 99204 OFFICE O/P NEW MOD 45 MIN: CPT | Performed by: INTERNAL MEDICINE

## 2023-10-06 NOTE — PROGRESS NOTES
This is a Rheumatology Consult seen at the request of Dr. Richard Broderick      HPI: Roger Schwartz is a 75 y/o female who presents for further evaluation rheumatoid arthritis. She has past medical history GERD, HTN, RA    She is transitioning from outside rheumatologist Dr. Jahaira Smith    H/o seropositive RA, possible psoriasis, OA knees, DDD, radiculopathy    She is doing well on  BID and prednisone (5-5) . She mentions she has been on chronic prednisone > 20 years    Scaly dermatitis noted per Dr Jahaira Smith (intermittent).  cosentyx was considered due to possible PsA but at this time she is doing well on HCQ     Was on Humira in the past but she does not wish to resume as doing well.                   --------------------------------------------------------------------------------------------------------        ROS:      All other ROS was reviewed and negative except as above         --------------------------------------------------------------------------------------------------------    Past Medical History    Past Medical History:   Diagnosis Date   • Abnormal thyroid function test     last assessed: 2015    • Arthritis    • Caries     last assessed: 2016    • Continuous opioid dependence ([address]) 2021   • Edema of right lower extremity     last assessed: 2015    • GERD (gastroesophageal reflux disease)    • Hypertension    • Medicare annual wellness visit, subsequent 2021   • Positive blood culture 3/11/2022   • Sarcoid            Past Surgical History    Past Surgical History:   Procedure Laterality Date   •  SECTION     • MULTIPLE TOOTH EXTRACTIONS N/A 2016    Procedure: Surgical extraction of teeth 2, 18, 19, 30, 32; incision and drainage of left subperiosteal abscess ;  Surgeon: Darwin Sutherland DMD;  Location: [facility];  Service:            Family History    Family History   Problem Relation Age of Onset   • Asthma Mother    • Stroke Mother    • No Known Problems Father    • No Known Problems Sister    • No Known Problems Brother    • No Known Problems Maternal Grandmother    • No Known Problems Maternal Grandfather    • No Known Problems Paternal Grandmother    • No Known Problems Paternal Grandfather    • Diabetes Maternal Aunt    • Breast cancer Cousin    • Diabetes Cousin    • Breast cancer Other             Social History    Social History     Tobacco Use   • Smoking status: Never   • Smokeless tobacco: Never   Vaping Use   • Vaping Use: Never used   Substance Use Topics   • Alcohol use: Not Currently   • Drug use: No         Allergies    Allergies   Allergen Reactions   • Shellfish-Derived Products - Food Allergy Itching   • Methotrexate Derivatives    • Amoxicillin Rash   • Ampicillin-Sulbactam Sodium Rash         Medications    Current Outpatient Medications   Medication Instructions   • acetaminophen (TYLENOL) 650 mg, Oral, Every 4 hours PRN   • albuterol (Ventolin HFA) 90 mcg/act inhaler 2 puffs, Inhalation, Every 6 hours PRN   • alendronate (FOSAMAX) 70 mg tablet Oral, Every 7 days   • apixaban (ELIQUIS) 5 mg, Oral, 2 times daily   • cholecalciferol (VITAMIN D3) 1,000 Units, Oral, Daily   • clobetasol (TEMOVATE) 0.05 % cream Topical, 2 times daily   • Clobetasol Propionate E 0.05 % emollient cream APPLY TWICE A DAY FOR PALM SKIN DERMITITS   • furosemide (LASIX) 40 mg, Oral, Daily   • gabapentin (Neurontin) 100 mg capsule Take 1 capsule in AM and 3 capsules in PM   • hydrocortisone 2.5 % ointment Topical, 4 times daily PRN   • hydroxychloroquine (PLAQUENIL) 200 mg, Oral, 2 times daily   • ketoconazole (NIZORAL) 2 % cream Topical, 2 times daily   • lidocaine (LIDODERM) 5 % 2 patches, Topical, Daily, Remove & Discard patch within 12 hours or as directed by MD   • metoprolol tartrate (LOPRESSOR) 25 mg, Oral, Every 12 hours scheduled   • nystatin (MYCOSTATIN) powder Topical, 2 times daily   • omeprazole (PRILOSEC) 20 mg, Oral, Daily   • polyethylene glycol (MIRALAX) 17 g, Oral, Daily   • potassium chloride (K-DUR,KLOR-CON) 10 mEq tablet 10 mEq, Oral, Daily   • predniSONE 5 mg, Oral, 2 times daily with meals   • senna-docusate sodium (SENOKOT S) 8.6-50 mg per tablet 1 tablet, Oral, Daily   • triamcinolone (KENALOG) 0.1 % cream 1 application. , Topical, 2 times daily, To affected area   • white petrolatum-mineral oil (EUCERIN,HYDROCERIN) cream Topical, 3 times daily          Physical Exam    /84   Ht 5' 3" (1.6 m)   Wt 117 kg (258 lb)   LMP  (LMP Unknown)   BMI 45.70 kg/m²     GEN: AAO, No apparent distress. Patient is well developed. HEENT:  Pupils are equal, round and reactive. Sclera are clear. Fundoscopic exam is normal.  External ears are without lesions. Oral pharynx is clear of ulcers or other lesions. MMM. NECK:  Supple. There is no adenopathy appreciable in anterior or posterior cervical chains or supraclavicularly. JVP is normal.    HEART: Regular rate and rhythm. There is no appreciable murmur, gallop or rub. LUNGS: Clear to auscultation. ABD:  Soft, without tenderness, rebound or guarding. No appreciable organomegally. NEURO: Speech and cognition are normal.  Strength is 5/5 throughout. Tone is normal.  DTRs are 2/4 at the knees, ankles and elbows.   Gait is normal.  SKIN: There are no rashes or lesions    MUSCULOSKELETAL:   No synovitis       ________________________________________________________________________          Results Review    Component      Latest Ref Rng 5/30/2023 5/31/2023   Sodium      135 - 147 mmol/L 134 (L)  138    Potassium      3.5 - 5.3 mmol/L 4.2  3.9    Chloride      96 - 108 mmol/L 103  107    CO2      21 - 32 mmol/L 29  28    Anion Gap      4 - 13 mmol/L 2 (L)  3 (L)    BUN      5 - 25 mg/dL 29 (H)  34 (H)    Creatinine      0.60 - 1.30 mg/dL 1.63 (H)  1.46 (H)    Glucose, Random      65 - 140 mg/dL 82  82    Calcium      8.3 - 10.1 mg/dL 9.1  9.0    CORRECTED CALCIUM      8.3 - 10.1 mg/dL 10.5 (H) AST      5 - 45 U/L 43     ALT      12 - 78 U/L 42     Alkaline Phosphatase      46 - 116 U/L 63     Total Protein      6.4 - 8.4 g/dL 6.8     Albumin      3.5 - 5.0 g/dL 2.2 (L)     TOTAL BILIRUBIN      0.20 - 1.00 mg/dL 0.44     eGFR      ml/min/1.73sq m 30  35    WBC      4.31 - 10.16 Thousand/uL  9.00    Red Blood Cell Count      3.81 - 5.12 Million/uL  3.52 (L)    Hemoglobin      11.5 - 15.4 g/dL  9.9 (L)    HCT      34.8 - 46.1 %  33.0 (L)    MCV      82 - 98 fL  94    MCH      26.8 - 34.3 pg  28.1    MCHC      31.4 - 37.4 g/dL  30.0 (L)    RDW      11.6 - 15.1 %  13.4    Platelet Count      587 - 390 Thousands/uL  219    MPV      8.9 - 12.7 fL  11.1       Legend:  (L) Low  (H) High        Impressions and Plans:    Luis Cano is a 75 y/o female with chronic low back pain, knee OA, rheumatoid arthritis, possible psoriasis  Knee OA: Refer to Orthopedics  Seropositive RA: continue  BID and chronic prednisone 5 mg BID  Declines to resume Humira at this time  Reviewed labs    RTC 6 months    Thank you for involving me in this patient's care.         Radha Carrasco MD  Department of Rheumatology  [facility]

## 2023-12-28 ENCOUNTER — TELEPHONE (OUTPATIENT)
Dept: RHEUMATOLOGY | Facility: CLINIC | Age: 73
End: 2023-12-28

## 2023-12-28 NOTE — TELEPHONE ENCOUNTER
Unable to LM for them letting them know there is a template change in 2024 for scheduling and their appointment was moved on 4/11/24 from 11 to 10:30 and if they have questions to please reach out to the office.

## 2024-01-01 ENCOUNTER — HOSPICE ADMISSION (OUTPATIENT)
Dept: HOME HOSPICE | Facility: HOSPICE | Age: 74
End: 2024-01-01
Payer: MEDICARE

## 2024-01-22 ENCOUNTER — HOSPITAL ENCOUNTER (INPATIENT)
Facility: HOSPITAL | Age: 74
LOS: 18 days | Discharge: NURSING, REHAB OR LONG-TERM CARE | DRG: 871 | End: 2024-02-10
Attending: EMERGENCY MEDICINE | Admitting: STUDENT IN AN ORGANIZED HEALTH CARE EDUCATION/TRAINING PROGRAM
Payer: COMMERCIAL

## 2024-01-22 DIAGNOSIS — L40.1 PUSTULAR PSORIASIS: ICD-10-CM

## 2024-01-22 DIAGNOSIS — R41.82 ALTERED MENTAL STATUS: ICD-10-CM

## 2024-01-22 DIAGNOSIS — A41.9 SEPSIS (HCC): Primary | ICD-10-CM

## 2024-01-22 DIAGNOSIS — I48.91 ATRIAL FIBRILLATION, NEW ONSET (HCC): ICD-10-CM

## 2024-01-22 DIAGNOSIS — L03.90 CELLULITIS: ICD-10-CM

## 2024-01-22 DIAGNOSIS — R31.9 HEMATURIA, UNSPECIFIED TYPE: ICD-10-CM

## 2024-01-22 DIAGNOSIS — I48.91 ATRIAL FIBRILLATION WITH RVR (HCC): ICD-10-CM

## 2024-01-22 DIAGNOSIS — R78.81 GRAM-POSITIVE COCCI BACTEREMIA: ICD-10-CM

## 2024-01-22 PROCEDURE — 99285 EMERGENCY DEPT VISIT HI MDM: CPT

## 2024-01-23 ENCOUNTER — APPOINTMENT (EMERGENCY)
Dept: RADIOLOGY | Facility: HOSPITAL | Age: 74
DRG: 871 | End: 2024-01-23
Payer: COMMERCIAL

## 2024-01-23 PROBLEM — L03.90 CELLULITIS: Status: ACTIVE | Noted: 2017-10-27

## 2024-01-23 PROBLEM — R65.20 SEVERE SEPSIS (HCC): Status: ACTIVE | Noted: 2022-01-10

## 2024-01-23 LAB
ALBUMIN SERPL BCP-MCNC: 2.9 G/DL (ref 3.5–5)
ALBUMIN SERPL BCP-MCNC: 3.4 G/DL (ref 3.5–5)
ALP SERPL-CCNC: 45 U/L (ref 34–104)
ALP SERPL-CCNC: 53 U/L (ref 34–104)
ALT SERPL W P-5'-P-CCNC: 21 U/L (ref 7–52)
ALT SERPL W P-5'-P-CCNC: 24 U/L (ref 7–52)
ANION GAP SERPL CALCULATED.3IONS-SCNC: 11 MMOL/L
ANION GAP SERPL CALCULATED.3IONS-SCNC: 13 MMOL/L
APTT PPP: 29 SECONDS (ref 23–37)
AST SERPL W P-5'-P-CCNC: 30 U/L (ref 13–39)
AST SERPL W P-5'-P-CCNC: 33 U/L (ref 13–39)
ATRIAL RATE: 227 BPM
BACTERIA UR QL AUTO: ABNORMAL /HPF
BACTERIA UR QL AUTO: ABNORMAL /HPF
BASOPHILS # BLD AUTO: 0.01 THOUSANDS/ÂΜL (ref 0–0.1)
BASOPHILS # BLD AUTO: 0.01 THOUSANDS/ÂΜL (ref 0–0.1)
BASOPHILS NFR BLD AUTO: 0 % (ref 0–1)
BASOPHILS NFR BLD AUTO: 0 % (ref 0–1)
BILIRUB SERPL-MCNC: 0.93 MG/DL (ref 0.2–1)
BILIRUB SERPL-MCNC: 0.94 MG/DL (ref 0.2–1)
BILIRUB UR QL STRIP: NEGATIVE
BILIRUB UR QL STRIP: NEGATIVE
BUN SERPL-MCNC: 19 MG/DL (ref 5–25)
BUN SERPL-MCNC: 19 MG/DL (ref 5–25)
CALCIUM ALBUM COR SERPL-MCNC: 10.1 MG/DL (ref 8.3–10.1)
CALCIUM ALBUM COR SERPL-MCNC: 9.3 MG/DL (ref 8.3–10.1)
CALCIUM SERPL-MCNC: 8.4 MG/DL (ref 8.4–10.2)
CALCIUM SERPL-MCNC: 9.6 MG/DL (ref 8.4–10.2)
CHLORIDE SERPL-SCNC: 101 MMOL/L (ref 96–108)
CHLORIDE SERPL-SCNC: 104 MMOL/L (ref 96–108)
CLARITY UR: CLEAR
CLARITY UR: CLEAR
CO2 SERPL-SCNC: 26 MMOL/L (ref 21–32)
CO2 SERPL-SCNC: 28 MMOL/L (ref 21–32)
COLOR UR: ABNORMAL
COLOR UR: ABNORMAL
CREAT SERPL-MCNC: 1.75 MG/DL (ref 0.6–1.3)
CREAT SERPL-MCNC: 1.77 MG/DL (ref 0.6–1.3)
EOSINOPHIL # BLD AUTO: 0.15 THOUSAND/ÂΜL (ref 0–0.61)
EOSINOPHIL # BLD AUTO: 0.16 THOUSAND/ÂΜL (ref 0–0.61)
EOSINOPHIL NFR BLD AUTO: 2 % (ref 0–6)
EOSINOPHIL NFR BLD AUTO: 2 % (ref 0–6)
ERYTHROCYTE [DISTWIDTH] IN BLOOD BY AUTOMATED COUNT: 14.5 % (ref 11.6–15.1)
ERYTHROCYTE [DISTWIDTH] IN BLOOD BY AUTOMATED COUNT: 14.6 % (ref 11.6–15.1)
FLUAV RNA RESP QL NAA+PROBE: NEGATIVE
FLUBV RNA RESP QL NAA+PROBE: NEGATIVE
GFR SERPL CREATININE-BSD FRML MDRD: 27 ML/MIN/1.73SQ M
GFR SERPL CREATININE-BSD FRML MDRD: 28 ML/MIN/1.73SQ M
GLUCOSE SERPL-MCNC: 67 MG/DL (ref 65–140)
GLUCOSE SERPL-MCNC: 76 MG/DL (ref 65–140)
GLUCOSE UR STRIP-MCNC: NEGATIVE MG/DL
GLUCOSE UR STRIP-MCNC: NEGATIVE MG/DL
HCT VFR BLD AUTO: 41.5 % (ref 34.8–46.1)
HCT VFR BLD AUTO: 46.6 % (ref 34.8–46.1)
HGB BLD-MCNC: 12.6 G/DL (ref 11.5–15.4)
HGB BLD-MCNC: 14.2 G/DL (ref 11.5–15.4)
HGB UR QL STRIP.AUTO: ABNORMAL
HGB UR QL STRIP.AUTO: ABNORMAL
HYALINE CASTS #/AREA URNS LPF: ABNORMAL /LPF
IMM GRANULOCYTES # BLD AUTO: 0.05 THOUSAND/UL (ref 0–0.2)
IMM GRANULOCYTES # BLD AUTO: 0.09 THOUSAND/UL (ref 0–0.2)
IMM GRANULOCYTES NFR BLD AUTO: 1 % (ref 0–2)
IMM GRANULOCYTES NFR BLD AUTO: 1 % (ref 0–2)
INR PPP: 1.46 (ref 0.84–1.19)
KETONES UR STRIP-MCNC: NEGATIVE MG/DL
KETONES UR STRIP-MCNC: NEGATIVE MG/DL
LACTATE SERPL-SCNC: 2.3 MMOL/L (ref 0.5–2)
LACTATE SERPL-SCNC: 2.3 MMOL/L (ref 0.5–2)
LEUKOCYTE ESTERASE UR QL STRIP: ABNORMAL
LEUKOCYTE ESTERASE UR QL STRIP: ABNORMAL
LYMPHOCYTES # BLD AUTO: 0.87 THOUSANDS/ÂΜL (ref 0.6–4.47)
LYMPHOCYTES # BLD AUTO: 1.3 THOUSANDS/ÂΜL (ref 0.6–4.47)
LYMPHOCYTES NFR BLD AUTO: 10 % (ref 14–44)
LYMPHOCYTES NFR BLD AUTO: 19 % (ref 14–44)
MAGNESIUM SERPL-MCNC: 1.6 MG/DL (ref 1.9–2.7)
MCH RBC QN AUTO: 27.7 PG (ref 26.8–34.3)
MCH RBC QN AUTO: 28 PG (ref 26.8–34.3)
MCHC RBC AUTO-ENTMCNC: 30.4 G/DL (ref 31.4–37.4)
MCHC RBC AUTO-ENTMCNC: 30.5 G/DL (ref 31.4–37.4)
MCV RBC AUTO: 91 FL (ref 82–98)
MCV RBC AUTO: 92 FL (ref 82–98)
MONOCYTES # BLD AUTO: 0.41 THOUSAND/ÂΜL (ref 0.17–1.22)
MONOCYTES # BLD AUTO: 0.66 THOUSAND/ÂΜL (ref 0.17–1.22)
MONOCYTES NFR BLD AUTO: 6 % (ref 4–12)
MONOCYTES NFR BLD AUTO: 8 % (ref 4–12)
NEUTROPHILS # BLD AUTO: 5.06 THOUSANDS/ÂΜL (ref 1.85–7.62)
NEUTROPHILS # BLD AUTO: 6.84 THOUSANDS/ÂΜL (ref 1.85–7.62)
NEUTS SEG NFR BLD AUTO: 72 % (ref 43–75)
NEUTS SEG NFR BLD AUTO: 79 % (ref 43–75)
NITRITE UR QL STRIP: NEGATIVE
NITRITE UR QL STRIP: NEGATIVE
NON-SQ EPI CELLS URNS QL MICRO: ABNORMAL /HPF
NON-SQ EPI CELLS URNS QL MICRO: ABNORMAL /HPF
NRBC BLD AUTO-RTO: 0 /100 WBCS
NRBC BLD AUTO-RTO: 0 /100 WBCS
P AXIS: 0 DEGREES
PH UR STRIP.AUTO: 5.5 [PH]
PH UR STRIP.AUTO: 5.5 [PH]
PHOSPHATE SERPL-MCNC: 2.5 MG/DL (ref 2.3–4.1)
PLATELET # BLD AUTO: 204 THOUSANDS/UL (ref 149–390)
PLATELET # BLD AUTO: 212 THOUSANDS/UL (ref 149–390)
PMV BLD AUTO: 11.9 FL (ref 8.9–12.7)
PMV BLD AUTO: 12.4 FL (ref 8.9–12.7)
POTASSIUM SERPL-SCNC: 4.2 MMOL/L (ref 3.5–5.3)
POTASSIUM SERPL-SCNC: 4.7 MMOL/L (ref 3.5–5.3)
PROCALCITONIN SERPL-MCNC: 0.17 NG/ML
PROT SERPL-MCNC: 6.3 G/DL (ref 6.4–8.4)
PROT SERPL-MCNC: 7.6 G/DL (ref 6.4–8.4)
PROT UR STRIP-MCNC: ABNORMAL MG/DL
PROT UR STRIP-MCNC: NEGATIVE MG/DL
PROTHROMBIN TIME: 17.5 SECONDS (ref 11.6–14.5)
QRS AXIS: 66 DEGREES
QRSD INTERVAL: 78 MS
QT INTERVAL: 288 MS
QTC INTERVAL: 401 MS
RBC # BLD AUTO: 4.5 MILLION/UL (ref 3.81–5.12)
RBC # BLD AUTO: 5.13 MILLION/UL (ref 3.81–5.12)
RBC #/AREA URNS AUTO: ABNORMAL /HPF
RBC #/AREA URNS AUTO: ABNORMAL /HPF
RSV RNA RESP QL NAA+PROBE: NEGATIVE
SARS-COV-2 RNA RESP QL NAA+PROBE: NEGATIVE
SODIUM SERPL-SCNC: 140 MMOL/L (ref 135–147)
SODIUM SERPL-SCNC: 143 MMOL/L (ref 135–147)
SP GR UR STRIP.AUTO: 1.01 (ref 1–1.03)
SP GR UR STRIP.AUTO: 1.01 (ref 1–1.03)
T WAVE AXIS: -8 DEGREES
UROBILINOGEN UR STRIP-ACNC: <2 MG/DL
UROBILINOGEN UR STRIP-ACNC: <2 MG/DL
VENTRICULAR RATE: 117 BPM
WBC # BLD AUTO: 6.99 THOUSAND/UL (ref 4.31–10.16)
WBC # BLD AUTO: 8.62 THOUSAND/UL (ref 4.31–10.16)
WBC #/AREA URNS AUTO: ABNORMAL /HPF
WBC #/AREA URNS AUTO: ABNORMAL /HPF

## 2024-01-23 PROCEDURE — 92610 EVALUATE SWALLOWING FUNCTION: CPT

## 2024-01-23 PROCEDURE — 36415 COLL VENOUS BLD VENIPUNCTURE: CPT

## 2024-01-23 PROCEDURE — 87081 CULTURE SCREEN ONLY: CPT | Performed by: INTERNAL MEDICINE

## 2024-01-23 PROCEDURE — 83605 ASSAY OF LACTIC ACID: CPT

## 2024-01-23 PROCEDURE — 93005 ELECTROCARDIOGRAM TRACING: CPT

## 2024-01-23 PROCEDURE — 96365 THER/PROPH/DIAG IV INF INIT: CPT

## 2024-01-23 PROCEDURE — 87040 BLOOD CULTURE FOR BACTERIA: CPT

## 2024-01-23 PROCEDURE — 87154 CUL TYP ID BLD PTHGN 6+ TRGT: CPT

## 2024-01-23 PROCEDURE — RECHECK: Performed by: NURSE PRACTITIONER

## 2024-01-23 PROCEDURE — 83735 ASSAY OF MAGNESIUM: CPT | Performed by: INTERNAL MEDICINE

## 2024-01-23 PROCEDURE — 87077 CULTURE AEROBIC IDENTIFY: CPT

## 2024-01-23 PROCEDURE — 99291 CRITICAL CARE FIRST HOUR: CPT | Performed by: EMERGENCY MEDICINE

## 2024-01-23 PROCEDURE — 74176 CT ABD & PELVIS W/O CONTRAST: CPT

## 2024-01-23 PROCEDURE — 87186 SC STD MICRODIL/AGAR DIL: CPT

## 2024-01-23 PROCEDURE — 84145 PROCALCITONIN (PCT): CPT

## 2024-01-23 PROCEDURE — 99223 1ST HOSP IP/OBS HIGH 75: CPT | Performed by: INTERNAL MEDICINE

## 2024-01-23 PROCEDURE — 85610 PROTHROMBIN TIME: CPT

## 2024-01-23 PROCEDURE — 80053 COMPREHEN METABOLIC PANEL: CPT

## 2024-01-23 PROCEDURE — 84100 ASSAY OF PHOSPHORUS: CPT | Performed by: INTERNAL MEDICINE

## 2024-01-23 PROCEDURE — 81001 URINALYSIS AUTO W/SCOPE: CPT

## 2024-01-23 PROCEDURE — 85730 THROMBOPLASTIN TIME PARTIAL: CPT

## 2024-01-23 PROCEDURE — 93010 ELECTROCARDIOGRAM REPORT: CPT | Performed by: INTERNAL MEDICINE

## 2024-01-23 PROCEDURE — G1004 CDSM NDSC: HCPCS

## 2024-01-23 PROCEDURE — 87147 CULTURE TYPE IMMUNOLOGIC: CPT

## 2024-01-23 PROCEDURE — 81001 URINALYSIS AUTO W/SCOPE: CPT | Performed by: INTERNAL MEDICINE

## 2024-01-23 PROCEDURE — 0241U HB NFCT DS VIR RESP RNA 4 TRGT: CPT

## 2024-01-23 PROCEDURE — C9113 INJ PANTOPRAZOLE SODIUM, VIA: HCPCS | Performed by: INTERNAL MEDICINE

## 2024-01-23 PROCEDURE — 71250 CT THORAX DX C-: CPT

## 2024-01-23 PROCEDURE — 85025 COMPLETE CBC W/AUTO DIFF WBC: CPT | Performed by: INTERNAL MEDICINE

## 2024-01-23 PROCEDURE — 80053 COMPREHEN METABOLIC PANEL: CPT | Performed by: INTERNAL MEDICINE

## 2024-01-23 PROCEDURE — 85025 COMPLETE CBC W/AUTO DIFF WBC: CPT

## 2024-01-23 RX ORDER — SODIUM CHLORIDE, SODIUM GLUCONATE, SODIUM ACETATE, POTASSIUM CHLORIDE, MAGNESIUM CHLORIDE, SODIUM PHOSPHATE, DIBASIC, AND POTASSIUM PHOSPHATE .53; .5; .37; .037; .03; .012; .00082 G/100ML; G/100ML; G/100ML; G/100ML; G/100ML; G/100ML; G/100ML
50 INJECTION, SOLUTION INTRAVENOUS CONTINUOUS
Status: DISCONTINUED | OUTPATIENT
Start: 2024-01-23 | End: 2024-01-24

## 2024-01-23 RX ORDER — ACETAMINOPHEN 650 MG/1
650 SUPPOSITORY RECTAL EVERY 4 HOURS PRN
Status: DISCONTINUED | OUTPATIENT
Start: 2024-01-23 | End: 2024-01-25

## 2024-01-23 RX ORDER — METOPROLOL TARTRATE 1 MG/ML
2.5 INJECTION, SOLUTION INTRAVENOUS EVERY 6 HOURS PRN
Status: DISCONTINUED | OUTPATIENT
Start: 2024-01-23 | End: 2024-01-23

## 2024-01-23 RX ORDER — ACETAMINOPHEN 650 MG/1
650 SUPPOSITORY RECTAL EVERY 4 HOURS PRN
Status: DISCONTINUED | OUTPATIENT
Start: 2024-01-23 | End: 2024-01-23

## 2024-01-23 RX ORDER — ALBUTEROL SULFATE 90 UG/1
2 AEROSOL, METERED RESPIRATORY (INHALATION) EVERY 6 HOURS PRN
Status: DISCONTINUED | OUTPATIENT
Start: 2024-01-23 | End: 2024-02-10 | Stop reason: HOSPADM

## 2024-01-23 RX ORDER — ACETAMINOPHEN 325 MG/1
650 TABLET ORAL EVERY 6 HOURS PRN
Status: DISCONTINUED | OUTPATIENT
Start: 2024-01-23 | End: 2024-01-23

## 2024-01-23 RX ORDER — PANTOPRAZOLE SODIUM 40 MG/10ML
40 INJECTION, POWDER, LYOPHILIZED, FOR SOLUTION INTRAVENOUS
Status: DISCONTINUED | OUTPATIENT
Start: 2024-01-23 | End: 2024-01-25

## 2024-01-23 RX ORDER — METOPROLOL TARTRATE 1 MG/ML
5 INJECTION, SOLUTION INTRAVENOUS ONCE
Status: COMPLETED | OUTPATIENT
Start: 2024-01-23 | End: 2024-01-23

## 2024-01-23 RX ORDER — ENOXAPARIN SODIUM 100 MG/ML
40 INJECTION SUBCUTANEOUS DAILY
Status: CANCELLED | OUTPATIENT
Start: 2024-01-23

## 2024-01-23 RX ORDER — PANTOPRAZOLE SODIUM 20 MG/1
20 TABLET, DELAYED RELEASE ORAL
Status: DISCONTINUED | OUTPATIENT
Start: 2024-01-23 | End: 2024-01-23

## 2024-01-23 RX ORDER — PREDNISONE 5 MG/1
5 TABLET ORAL 2 TIMES DAILY WITH MEALS
Status: DISCONTINUED | OUTPATIENT
Start: 2024-01-24 | End: 2024-01-24

## 2024-01-23 RX ORDER — MAGNESIUM HYDROXIDE/ALUMINUM HYDROXICE/SIMETHICONE 120; 1200; 1200 MG/30ML; MG/30ML; MG/30ML
30 SUSPENSION ORAL EVERY 6 HOURS PRN
Status: DISCONTINUED | OUTPATIENT
Start: 2024-01-23 | End: 2024-01-24

## 2024-01-23 RX ORDER — SODIUM CHLORIDE, SODIUM GLUCONATE, SODIUM ACETATE, POTASSIUM CHLORIDE, MAGNESIUM CHLORIDE, SODIUM PHOSPHATE, DIBASIC, AND POTASSIUM PHOSPHATE .53; .5; .37; .037; .03; .012; .00082 G/100ML; G/100ML; G/100ML; G/100ML; G/100ML; G/100ML; G/100ML
500 INJECTION, SOLUTION INTRAVENOUS ONCE
Status: COMPLETED | OUTPATIENT
Start: 2024-01-23 | End: 2024-01-23

## 2024-01-23 RX ORDER — LANOLIN ALCOHOL/MO/W.PET/CERES
CREAM (GRAM) TOPICAL 3 TIMES DAILY
Status: DISCONTINUED | OUTPATIENT
Start: 2024-01-23 | End: 2024-02-10 | Stop reason: HOSPADM

## 2024-01-23 RX ORDER — PREDNISONE 5 MG/1
5 TABLET ORAL 2 TIMES DAILY WITH MEALS
Status: DISCONTINUED | OUTPATIENT
Start: 2024-01-23 | End: 2024-01-23

## 2024-01-23 RX ORDER — METOPROLOL TARTRATE 1 MG/ML
5 INJECTION, SOLUTION INTRAVENOUS EVERY 6 HOURS PRN
Status: DISCONTINUED | OUTPATIENT
Start: 2024-01-23 | End: 2024-02-10 | Stop reason: HOSPADM

## 2024-01-23 RX ORDER — DOCUSATE SODIUM 100 MG/1
100 CAPSULE, LIQUID FILLED ORAL 2 TIMES DAILY PRN
Status: DISCONTINUED | OUTPATIENT
Start: 2024-01-23 | End: 2024-02-10 | Stop reason: HOSPADM

## 2024-01-23 RX ORDER — HYDROXYCHLOROQUINE SULFATE 200 MG/1
200 TABLET, FILM COATED ORAL 2 TIMES DAILY
Status: DISCONTINUED | OUTPATIENT
Start: 2024-01-23 | End: 2024-01-23

## 2024-01-23 RX ORDER — ONDANSETRON 2 MG/ML
4 INJECTION INTRAMUSCULAR; INTRAVENOUS EVERY 6 HOURS PRN
Status: DISCONTINUED | OUTPATIENT
Start: 2024-01-23 | End: 2024-02-10 | Stop reason: HOSPADM

## 2024-01-23 RX ORDER — MELATONIN
1000 DAILY
Status: DISCONTINUED | OUTPATIENT
Start: 2024-01-23 | End: 2024-02-10 | Stop reason: HOSPADM

## 2024-01-23 RX ORDER — MAGNESIUM SULFATE HEPTAHYDRATE 40 MG/ML
2 INJECTION, SOLUTION INTRAVENOUS ONCE
Status: COMPLETED | OUTPATIENT
Start: 2024-01-23 | End: 2024-01-24

## 2024-01-23 RX ADMIN — SODIUM CHLORIDE, SODIUM GLUCONATE, SODIUM ACETATE, POTASSIUM CHLORIDE, MAGNESIUM CHLORIDE, SODIUM PHOSPHATE, DIBASIC, AND POTASSIUM PHOSPHATE 500 ML: .53; .5; .37; .037; .03; .012; .00082 INJECTION, SOLUTION INTRAVENOUS at 04:07

## 2024-01-23 RX ADMIN — METOROPROLOL TARTRATE 5 MG: 5 INJECTION, SOLUTION INTRAVENOUS at 06:29

## 2024-01-23 RX ADMIN — ACETAMINOPHEN 650 MG: 650 SUPPOSITORY RECTAL at 06:20

## 2024-01-23 RX ADMIN — VANCOMYCIN HYDROCHLORIDE 2000 MG: 10 INJECTION, POWDER, LYOPHILIZED, FOR SOLUTION INTRAVENOUS at 04:08

## 2024-01-23 RX ADMIN — PANTOPRAZOLE SODIUM 40 MG: 40 INJECTION, POWDER, FOR SOLUTION INTRAVENOUS at 06:21

## 2024-01-23 RX ADMIN — MAGNESIUM SULFATE HEPTAHYDRATE 2 G: 40 INJECTION, SOLUTION INTRAVENOUS at 22:54

## 2024-01-23 RX ADMIN — METOROPROLOL TARTRATE 5 MG: 5 INJECTION, SOLUTION INTRAVENOUS at 22:53

## 2024-01-23 RX ADMIN — CEFEPIME 2000 MG: 2 INJECTION, POWDER, FOR SOLUTION INTRAVENOUS at 16:47

## 2024-01-23 RX ADMIN — SODIUM CHLORIDE 500 ML: 0.9 INJECTION, SOLUTION INTRAVENOUS at 02:30

## 2024-01-23 RX ADMIN — CEFEPIME 2000 MG: 2 INJECTION, POWDER, FOR SOLUTION INTRAVENOUS at 12:55

## 2024-01-23 RX ADMIN — SODIUM CHLORIDE, SODIUM GLUCONATE, SODIUM ACETATE, POTASSIUM CHLORIDE, MAGNESIUM CHLORIDE, SODIUM PHOSPHATE, DIBASIC, AND POTASSIUM PHOSPHATE 75 ML/HR: .53; .5; .37; .037; .03; .012; .00082 INJECTION, SOLUTION INTRAVENOUS at 16:46

## 2024-01-23 RX ADMIN — SODIUM CHLORIDE, SODIUM GLUCONATE, SODIUM ACETATE, POTASSIUM CHLORIDE, MAGNESIUM CHLORIDE, SODIUM PHOSPHATE, DIBASIC, AND POTASSIUM PHOSPHATE 75 ML/HR: .53; .5; .37; .037; .03; .012; .00082 INJECTION, SOLUTION INTRAVENOUS at 11:22

## 2024-01-23 RX ADMIN — METOROPROLOL TARTRATE 5 MG: 5 INJECTION, SOLUTION INTRAVENOUS at 03:11

## 2024-01-23 RX ADMIN — ONDANSETRON 4 MG: 2 INJECTION INTRAMUSCULAR; INTRAVENOUS at 17:33

## 2024-01-23 RX ADMIN — CEFEPIME 2000 MG: 2 INJECTION, POWDER, FOR SOLUTION INTRAVENOUS at 01:30

## 2024-01-23 RX ADMIN — SODIUM CHLORIDE, SODIUM GLUCONATE, SODIUM ACETATE, POTASSIUM CHLORIDE, MAGNESIUM CHLORIDE, SODIUM PHOSPHATE, DIBASIC, AND POTASSIUM PHOSPHATE 500 ML: .53; .5; .37; .037; .03; .012; .00082 INJECTION, SOLUTION INTRAVENOUS at 06:14

## 2024-01-23 RX ADMIN — Medication: at 22:26

## 2024-01-23 RX ADMIN — Medication: at 11:26

## 2024-01-23 NOTE — ASSESSMENT & PLAN NOTE
Wt Readings from Last 3 Encounters:   10/06/23 117 kg (258 lb)   06/01/23 117 kg (258 lb)   12/16/22 120 kg (264 lb 15.9 oz)   Patient is on furosemide at home.  Currently will put that on hold as she appears dehydrated and possibly with septic condition.

## 2024-01-23 NOTE — ASSESSMENT & PLAN NOTE
Patient with dermatitis.  Continue Eucerin.  In order to avoid further skin infection possibly portal of entry being sites of skin breakdown, will put Corey catheter.

## 2024-01-23 NOTE — PROGRESS NOTES
Bhavna Al is a 73 y.o. female who is currently ordered Vancomycin IV with management by the Pharmacy Consult service.  Relevant clinical data and objective / subjective history reviewed.  Vancomycin Assessment:  Indication and Goal AUC/Trough: Soft tissue (goal -600, trough >10)  Clinical Status: worsening  Micro:   : Blood Culture : pending  Renal Function:   SCr: 1.77 mg/dL  CrCl: 34.9 mL/min  Renal replacement: Not on dialysis  Days of Therapy: 1  Current Dose: Loaded 2000 mg IV x1  Vancomycin Plan:  New Dosin mg IV PRN when random vanco level <15  Next Level:  at 0600  Renal Function Monitoring: Daily BMP and UOP  Pharmacy will continue to follow closely for s/sx of nephrotoxicity, infusion reactions and appropriateness of therapy.  BMP and CBC will be ordered per protocol. We will continue to follow the patient’s culture results and clinical progress daily.    Danae Kidd, Pharmacist

## 2024-01-23 NOTE — PROGRESS NOTES
[facility]  Progress Note  Name: Bhavna Al I  MRN: [id number]  Unit/Bed#: ED 29 I Date of Admission: 1/22/2024   Date of Service: 1/23/2024 I Hospital Day: 0    Assessment/Plan   * Severe sepsis (HCC)  Assessment & Plan  Presented with metabolic encephalopathy, meeting sepsis criteria   Suspected source cellulitis   Cefepime/vancomycin pending cultures   Appreciate infectious disease consultation   Pharmacy consult.  Blood cultures pending  Trend fever curve, leukocytosis    Chronic kidney disease  Assessment & Plan  Lab Results   Component Value Date    EGFR 28 01/23/2024    EGFR 27 01/23/2024    EGFR 35 05/31/2023    CREATININE 1.75 (H) 01/23/2024    CREATININE 1.77 (H) 01/23/2024    CREATININE 1.46 (H) 05/31/2023   CKD 3   Creatinine baseline   N.p.o. after failing speech therapy swallow evaluation, gentle hydration   Trend creatinine  UA unremarkable    Ambulatory dysfunction  Assessment & Plan  Resides in SNF for rehabilitation therapy  Appreciate case management consult    Chronic diastolic heart failure (HCC)  Assessment & Plan  Wt Readings from Last 3 Encounters:   10/06/23 117 kg (258 lb)   06/01/23 117 kg (258 lb)   12/16/22 120 kg (264 lb 15.9 oz)   Home furosemide on hold in setting of severe sepsis   Patient is lying flat with oximetry 97% on room air during evaluation, will continue IV hydration   Monitor volume status  Corey catheter placed, continue for 48 hours due to severe sepsis.    Paroxysmal atrial fibrillation (HCC)  Assessment & Plan  Unable to tolerate oral intake and did not receive oral metoprolol, A-fib RVR at time of evaluation 1/23   As needed IV metoprolol   Monitor on telemetry until rate controlled   Continue PTA Eliquis     Cellulitis  Assessment & Plan  History of psoriasis  Multiple areas of erythema and cracks on body, right abdominal wall with significant erythema  Empiric cefepime/vancomycin  Trend fever curve, leukocytosis    Vitamin D  deficiency  Assessment & Plan  Continue cholecalciferol.    Benign essential hypertension  Assessment & Plan  Normotensive in setting of severe sepsis   Continue metoprolol for A-fib RVR   Trend blood pressures    Rheumatoid arthritis (HCC)  Assessment & Plan  Continue prednisone 5 mg twice daily.  Hydroxychloroquine on hold in the setting of severe sepsis         VTE Pharmacologic Prophylaxis:   High Risk (Score >/= 5) - Pharmacological DVT Prophylaxis Ordered: apixaban (Eliquis). Sequential Compression Devices Ordered.    Mobility:      HLM Goal NOT achieved. Continue with multidisciplinary rounding and encourage appropriate mobility to improve upon HLM goals.    Patient Centered Rounds: I performed bedside rounds with nursing staff today.   Discussions with Specialists or Other Care Team Provider: Infectious disease    Education and Discussions with Family / Patient: Updated  (daughter) at bedside.    Current Length of Stay: 0 day(s)  Current Patient Status: Inpatient   Certification Statement: The patient will continue to require additional inpatient hospital stay due to severe sepsis pending cultures  Discharge Plan: Anticipate discharge in 48-72 hrs to prior assisted or independent living facility.    Code Status: Level 1 - Full Code    Subjective:   Lethargic but denies chest pain, dyspnea    Objective:     Vitals:   Temp (24hrs), Av.4 °F (38.6 °C), Min:101.3 °F (38.5 °C), Max:101.4 °F (38.6 °C)    Temp:  [101.3 °F (38.5 °C)-101.4 °F (38.6 °C)] 101.3 °F (38.5 °C)  HR:  [104-142] 126  Resp:  [20-36] 22  BP: (101-151)/(52-85) 114/85  SpO2:  [91 %-100 %] 99 %  There is no height or weight on file to calculate BMI.     Input and Output Summary (last 24 hours):     Intake/Output Summary (Last 24 hours) at 2024 1707  Last data filed at 2024 0621  Gross per 24 hour   Intake 1550 ml   Output --   Net 1550 ml       Physical Exam:   Physical Exam  Vitals and nursing note reviewed.    Constitutional:       General: She is not in acute distress.     Appearance: She is well-developed. She is ill-appearing.   HENT:      Head: Normocephalic and atraumatic.      Mouth/Throat:      Mouth: Mucous membranes are dry.   Eyes:      Conjunctiva/sclera: Conjunctivae normal.   Cardiovascular:      Rate and Rhythm: Tachycardia present. Rhythm irregular.      Heart sounds: No murmur heard.  Pulmonary:      Effort: Pulmonary effort is normal. No respiratory distress.      Breath sounds: Normal breath sounds.   Abdominal:      Palpations: Abdomen is soft.      Tenderness: There is no abdominal tenderness.   Musculoskeletal:         General: No swelling.      Cervical back: Neck supple.   Skin:     General: Skin is warm and dry.      Capillary Refill: Capillary refill takes less than 2 seconds.      Findings: Erythema present.      Comments: Abdominal wall with erythema   Neurological:      General: No focal deficit present.      Mental Status: She is lethargic.   Psychiatric:      Comments: Unable to evaluate        Additional Data:     Labs:  Results from last 7 days   Lab Units 01/23/24  0549   WBC Thousand/uL 8.62   HEMOGLOBIN g/dL 12.6   HEMATOCRIT % 41.5   PLATELETS Thousands/uL 204   NEUTROS PCT % 79*   LYMPHS PCT % 10*   MONOS PCT % 8   EOS PCT % 2     Results from last 7 days   Lab Units 01/23/24  0549   SODIUM mmol/L 143   POTASSIUM mmol/L 4.2   CHLORIDE mmol/L 104   CO2 mmol/L 26   BUN mg/dL 19   CREATININE mg/dL 1.75*   ANION GAP mmol/L 13   CALCIUM mg/dL 8.4   ALBUMIN g/dL 2.9*   TOTAL BILIRUBIN mg/dL 0.93   ALK PHOS U/L 45   ALT U/L 21   AST U/L 33   GLUCOSE RANDOM mg/dL 67     Results from last 7 days   Lab Units 01/23/24  0013   INR  1.46*             Results from last 7 days   Lab Units 01/23/24  0232 01/23/24  0013   LACTIC ACID mmol/L 2.3* 2.3*   PROCALCITONIN ng/ml  --  0.17       Lines/Drains:  Invasive Devices       Peripheral Intravenous Line  Duration             Peripheral IV 01/23/24  Right;Ventral (anterior) Forearm <1 day              Drain  Duration             External Urinary Catheter 241 days    Urethral Catheter <1 day                  Urinary Catheter:  Goal for removal: Remove after 48 hrs of I/O monitoring           Telemetry:  Telemetry Orders (From admission, onward)               24 Hour Telemetry Monitoring  Continuous x 24 Hours (Telem)        Question:  Reason for 24 Hour Telemetry  Answer:  Arrhythmias requiring acute medical intervention / PPM or ICD malfunction                     Telemetry Reviewed: Atrial fibrillation. HR averaging 120  Indication for Continued Telemetry Use: Arrthymias requiring medical therapy             Imaging: Reviewed radiology reports from this admission including: chest CT scan and abdominal/pelvic CT    Recent Cultures (last 7 days):   Results from last 7 days   Lab Units 01/23/24  0013   BLOOD CULTURE  Received in Microbiology Lab. Culture in Progress.  Received in Microbiology Lab. Culture in Progress.       Last 24 Hours Medication List:   Current Facility-Administered Medications   Medication Dose Route Frequency Provider Last Rate    acetaminophen  650 mg Rectal Q4H PRN Pepe Do MD      albuterol  2 puff Inhalation Q6H PRN Pepe Do MD      aluminum-magnesium hydroxide-simethicone  30 mL Oral Q6H PRN Pepe Do MD      apixaban  5 mg Oral BID Pepe Do MD      cefepime  2,000 mg Intravenous Q12H Pepe Do MD 2,000 mg (01/23/24 1647)    cholecalciferol  1,000 Units Oral Daily Pepe Do MD      docusate sodium  100 mg Oral BID PRN Pepe Do MD      metoprolol  2.5 mg Intravenous Q6H PRN Janee S Jeannie, CRNP      metoprolol tartrate  25 mg Oral Q12H [facility] Pepe Do MD      multi-electrolyte  75 mL/hr Intravenous Continuous Janee S Jeannie, CRNP 75 mL/hr (01/23/24 1646)    ondansetron  4 mg Intravenous Q6H PRN Pepe Do MD      pantoprazole  40 mg  Intravenous Q24H [facility] Pepe Do MD      [START ON 1/24/2024] predniSONE  5 mg Oral BID With Meals AZIZA Driver      vancomycin  15 mg/kg (Adjusted) Intravenous Daily PRN Pepe Do MD      white petrolatum-mineral oil   Topical TID Pepe Do MD          Today, Patient Was Seen By: AZIZA Driver    **Please Note: This note may have been constructed using a voice recognition system.**

## 2024-01-23 NOTE — ASSESSMENT & PLAN NOTE
Wt Readings from Last 3 Encounters:   10/06/23 117 kg (258 lb)   06/01/23 117 kg (258 lb)   12/16/22 120 kg (264 lb 15.9 oz)   Home furosemide on hold in setting of severe sepsis   Patient is lying flat with oximetry 97% on room air during evaluation, will continue IV hydration   Monitor volume status  Corey catheter placed, continue for 48 hours due to severe sepsis.

## 2024-01-23 NOTE — ED ATTENDING ATTESTATION
1/22/2024  I, Jose Powell MD, saw and evaluated the patient. I have discussed the patient with the resident/non-physician practitioner and agree with the resident's/non-physician practitioner's findings, Plan of Care, and MDM as documented in the resident's/non-physician practitioner's note, except where noted. All available labs and Radiology studies were reviewed.  I was present for key portions of any procedure(s) performed by the resident/non-physician practitioner and I was immediately available to provide assistance.       At this point I agree with the current assessment done in the Emergency Department.  I have conducted an independent evaluation of this patient a history and physical is as follows:    ED Course  ED Course as of 01/23/24 0125   Tue Jan 23, 2024   0035 72 YO F morbid obesity; CHF became confused abruptly at the NH; O: confused female; AF febrile psoriasis skin pattern; I/P septic w/u CTAP; plan admission     Emergency Department Note- Bhavna Al 73 y.o. female MRN: [id number]    Unit/Bed#: ED 29 Encounter: [id number]    Bhavna Al is a 73 y.o. female who presents with   Chief Complaint   Patient presents with    Altered Mental Status     Pt coming from rehab after staff reported altered metal status         History of Present Illness   HPI:  Bhavna Al is a 73 y.o. female who presents for evaluation of:  Fevers and worsening confusion throughout the evening at her nursing facility.  The patient is confused and is unable to provide any further history or review of systems.    Review of Systems   Unable to perform ROS: Mental status change       Historical Information   Past Medical History:   Diagnosis Date    Abnormal thyroid function test     last assessed: Sept 25, 2015     Arthritis     Caries     last assessed: Sept 9, 2016     Continuous opioid dependence (HCC) 12/2/2021    Edema of right lower extremity     last assessed: March 18, 2015     GERD (gastroesophageal reflux disease)      Hypertension     Medicare annual wellness visit, subsequent 2021    Positive blood culture 3/11/2022    Sarcoid      Past Surgical History:   Procedure Laterality Date     SECTION      MULTIPLE TOOTH EXTRACTIONS N/A 2016    Procedure: Surgical extraction of teeth 2, 18, 19, 30, 31; incision and drainage of left subperiosteal abscess ;  Surgeon: Clara Cevallos DMD;  Location: [facility];  Service:      Social History   Social History     Substance and Sexual Activity   Alcohol Use Not Currently     Social History     Substance and Sexual Activity   Drug Use No     Social History     Tobacco Use   Smoking Status Never   Smokeless Tobacco Never     Family History:   Family History   Problem Relation Age of Onset    Asthma Mother     Stroke Mother     No Known Problems Father     No Known Problems Sister     No Known Problems Brother     No Known Problems Maternal Grandmother     No Known Problems Maternal Grandfather     No Known Problems Paternal Grandmother     No Known Problems Paternal Grandfather     Diabetes Maternal Aunt     Breast cancer Cousin     Diabetes Cousin     Breast cancer Other        Meds/Allergies   PTA meds:   Prior to Admission Medications   Prescriptions Last Dose Informant Patient Reported? Taking?   Clobetasol Propionate E 0.05 % emollient cream  Self, Outside Facility (Specify) Yes No   Sig: APPLY TWICE A DAY FOR PALM SKIN DERMITITS   acetaminophen (TYLENOL) 325 mg tablet  Self, Outside Facility (Specify) No No   Sig: Take 2 tablets (650 mg total) by mouth every 4 (four) hours as needed for mild pain, headaches or fever.   albuterol (Ventolin HFA) 90 mcg/act inhaler  Outside Facility (Specify) No No   Sig: Inhale 2 puffs every 6 (six) hours as needed for wheezing   alendronate (FOSAMAX) 70 mg tablet  Self, Outside Facility (Specify) Yes No   Sig: Take by mouth every 7 days    apixaban (ELIQUIS) 5 mg  Outside Facility (Specify) No No   Sig: Take 1 tablet (5 mg total)  by mouth 2 (two) times a day   cholecalciferol (VITAMIN D3) 1,000 units tablet  Outside Facility (Specify) No No   Sig: Take 1 tablet (1,000 Units total) by mouth daily   clobetasol (TEMOVATE) 0.05 % cream   No No   Sig: Apply topically 2 (two) times a day   furosemide (LASIX) 40 mg tablet   No No   Sig: Take 1 tablet (40 mg total) by mouth daily   gabapentin (Neurontin) 100 mg capsule  Outside Facility (Specify) No No   Sig: Take 1 capsule in AM and 3 capsules in PM   hydrocortisone 2.5 % ointment   No No   Sig: Apply topically 4 (four) times a day as needed for irritation   hydroxychloroquine (PLAQUENIL) 200 mg tablet  Outside Facility (Specify) No No   Sig: Take 1 tablet (200 mg total) by mouth 2 (two) times a day   ketoconazole (NIZORAL) 2 % cream  Self, Outside Facility (Specify) No No   Sig: Apply topically 2 (two) times a day   lidocaine (LIDODERM) 5 %   No No   Sig: Apply 2 patches topically daily Remove & Discard patch within 12 hours or as directed by MD Do not start before December 20, 2022.   metoprolol tartrate (LOPRESSOR) 25 mg tablet  Outside Facility (Specify) No No   Sig: Take 1 tablet (25 mg total) by mouth every 12 (twelve) hours   nystatin (MYCOSTATIN) powder  Outside Facility (Specify) No No   Sig: Apply topically 2 (two) times a day   omeprazole (PriLOSEC) 20 mg delayed release capsule  Outside Facility (Specify) No No   Sig: Take 1 capsule (20 mg total) by mouth daily   polyethylene glycol (MIRALAX) 17 g packet  Outside Facility (Specify) No No   Sig: Take 17 g by mouth daily Do not start before November 26, 2022.   potassium chloride (K-DUR,KLOR-CON) 10 mEq tablet  Outside Facility (Specify) No No   Sig: Take 1 tablet (10 mEq total) by mouth daily   predniSONE 5 mg tablet   No No   Sig: Take 1 tablet (5 mg total) by mouth 2 (two) times a day with meals Do not start before June 5, 2023.   senna-docusate sodium (SENOKOT S) 8.6-50 mg per tablet  Outside Facility (Specify) No No   Sig: Take 1  tablet by mouth daily   triamcinolone (KENALOG) 0.1 % cream  Outside Facility (Specify) No No   Sig: Apply 1 application topically 2 (two) times a day for 14 days To affected area   white petrolatum-mineral oil (EUCERIN,HYDROCERIN) cream  Self, Outside Facility (Specify) No No   Sig: Apply topically 3 (three) times a day      Facility-Administered Medications: None     Allergies   Allergen Reactions    Shellfish-Derived Products - Food Allergy Itching    Methotrexate Derivatives     Amoxicillin Rash    Ampicillin-Sulbactam Sodium Rash       Objective   First Vitals:   Temperature: (!) 101.4 °F (38.6 °C) (01/22/24 2314)  Temp Source: Rectal (01/22/24 2314)    Current Vitals:   Temperature: (!) 101.4 °F (38.6 °C) (01/22/24 2314)  Temp Source: Rectal (01/22/24 2314)    No intake or output data in the 24 hours ending 01/23/24 0125    Invasive Devices       Peripheral Intravenous Line  Duration             Peripheral IV 01/23/24 Proximal;Right;Ventral (anterior) Forearm <1 day              Drain  Duration             External Urinary Catheter 240 days                    Physical Exam  Vitals and nursing note reviewed.   Constitutional:       General: She is not in acute distress.     Appearance: Normal appearance. She is well-developed.   HENT:      Head: Normocephalic and atraumatic.      Right Ear: External ear normal.      Left Ear: External ear normal.      Nose: Nose normal.      Mouth/Throat:      Pharynx: No oropharyngeal exudate.   Eyes:      Conjunctiva/sclera: Conjunctivae normal.      Pupils: Pupils are equal, round, and reactive to light.   Cardiovascular:      Rate and Rhythm: Normal rate and regular rhythm.   Pulmonary:      Effort: Pulmonary effort is normal. No respiratory distress.   Abdominal:      General: Abdomen is flat. There is no distension.      Palpations: Abdomen is soft.   Musculoskeletal:         General: No deformity. Normal range of motion.      Cervical back: Normal range of motion and  neck supple.   Skin:     General: Skin is warm and dry.      Capillary Refill: Capillary refill takes less than 2 seconds.   Neurological:      General: No focal deficit present.      Mental Status: She is alert and oriented to person, place, and time. Mental status is at baseline.      GCS: GCS eye subscore is 2. GCS verbal subscore is 3. GCS motor subscore is 6.      Coordination: Coordination normal.   Psychiatric:         Thought Content: Thought content normal.         Cognition and Memory: Cognition is impaired. Memory is impaired. She exhibits impaired recent memory and impaired remote memory.      Comments: Decision-making capacity, thought content, and judgment are impaired secondary to altered mental status           Medical Decision Makin.  Acute confusion, encephalopathy, and fever: Septic workup including CBC rule out anemia and leukocytosis; pleat metabolic profile rule out electrolyte disturbance, uremia, and disorders of glucose homeostasis; lactic acid level rule out sepsis; blood cultures rule out bacteremia; urinalysis rule out UTI.    Recent Results (from the past 36 hour(s))   CBC and differential    Collection Time: 24 12:13 AM   Result Value Ref Range    WBC 6.99 4.31 - 10.16 Thousand/uL    RBC 5.13 (H) 3.81 - 5.12 Million/uL    Hemoglobin 14.2 11.5 - 15.4 g/dL    Hematocrit 46.6 (H) 34.8 - 46.1 %    MCV 91 82 - 98 fL    MCH 27.7 26.8 - 34.3 pg    MCHC 30.5 (L) 31.4 - 37.4 g/dL    RDW 14.5 11.6 - 15.1 %    MPV 12.4 8.9 - 12.7 fL    Platelets 212 149 - 390 Thousands/uL    nRBC 0 /100 WBCs    Neutrophils Relative 72 43 - 75 %    Immat GRANS % 1 0 - 2 %    Lymphocytes Relative 19 14 - 44 %    Monocytes Relative 6 4 - 12 %    Eosinophils Relative 2 0 - 6 %    Basophils Relative 0 0 - 1 %    Neutrophils Absolute 5.06 1.85 - 7.62 Thousands/µL    Immature Grans Absolute 0.05 0.00 - 0.20 Thousand/uL    Lymphocytes Absolute 1.30 0.60 - 4.47 Thousands/µL    Monocytes Absolute 0.41 0.17 -  "1.22 Thousand/µL    Eosinophils Absolute 0.16 0.00 - 0.61 Thousand/µL    Basophils Absolute 0.01 0.00 - 0.10 Thousands/µL   Comprehensive metabolic panel    Collection Time: 01/23/24 12:13 AM   Result Value Ref Range    Sodium 140 135 - 147 mmol/L    Potassium 4.7 3.5 - 5.3 mmol/L    Chloride 101 96 - 108 mmol/L    CO2 28 21 - 32 mmol/L    ANION GAP 11 mmol/L    BUN 19 5 - 25 mg/dL    Creatinine 1.77 (H) 0.60 - 1.30 mg/dL    Glucose 76 65 - 140 mg/dL    Calcium 9.6 8.4 - 10.2 mg/dL    Corrected Calcium 10.1 8.3 - 10.1 mg/dL    AST 30 13 - 39 U/L    ALT 24 7 - 52 U/L    Alkaline Phosphatase 53 34 - 104 U/L    Total Protein 7.6 6.4 - 8.4 g/dL    Albumin 3.4 (L) 3.5 - 5.0 g/dL    Total Bilirubin 0.94 0.20 - 1.00 mg/dL    eGFR 27 ml/min/1.73sq m   Lactic acid    Collection Time: 01/23/24 12:13 AM   Result Value Ref Range    LACTIC ACID 2.3 (HH) 0.5 - 2.0 mmol/L   Procalcitonin    Collection Time: 01/23/24 12:13 AM   Result Value Ref Range    Procalcitonin 0.17 <=0.25 ng/ml   Protime-INR    Collection Time: 01/23/24 12:13 AM   Result Value Ref Range    Protime 17.5 (H) 11.6 - 14.5 seconds    INR 1.46 (H) 0.84 - 1.19   APTT    Collection Time: 01/23/24 12:13 AM   Result Value Ref Range    PTT 29 23 - 37 seconds     CT chest abdomen pelvis wo contrast    (Results Pending)         Portions of the record may have been created with voice recognition software. Occasional wrong word or \"sound a like\" substitutions may have occurred due to the inherent limitations of voice recognition software.  Read the chart carefully and recognize, using context, where substitutions have occurred.        Critical Care Time  CriticalCare Time    Date/Time: 1/23/2024 1:29 AM    Performed by: Jose Powell MD  Authorized by: Jose Powell MD    Critical care provider statement:     Critical care time (minutes):  32    Critical care time was exclusive of:  Separately billable procedures and treating other patients and teaching time    " Critical care was necessary to treat or prevent imminent or life-threatening deterioration of the following conditions:  Sepsis    Critical care was time spent personally by me on the following activities:  Obtaining history from patient or surrogate, development of treatment plan with patient or surrogate, discussions with consultants, evaluation of patient's response to treatment, examination of patient, ordering and performing treatments and interventions, ordering and review of laboratory studies, ordering and review of radiographic studies, re-evaluation of patient's condition and review of old charts    I assumed direction of critical care for this patient from another provider in my specialty: no    Comments:      73-year-old female presents for fevers, asthenia, and acute encephalopathy; workup including CT scan initiated; care discussed with admitting [facility] internal medicine team; viral screening ordered

## 2024-01-23 NOTE — ASSESSMENT & PLAN NOTE
Lab Results   Component Value Date    EGFR 28 01/23/2024    EGFR 27 01/23/2024    EGFR 35 05/31/2023    CREATININE 1.75 (H) 01/23/2024    CREATININE 1.77 (H) 01/23/2024    CREATININE 1.46 (H) 05/31/2023   CKD 3   Creatinine baseline   N.p.o. after failing speech therapy swallow evaluation, gentle hydration   Trend creatinine  UA unremarkable

## 2024-01-23 NOTE — ASSESSMENT & PLAN NOTE
Continue metoprolol 25 mg every 12.  With tachycardia with heart rate of 130s; atrial fibrillation in RVR, will administer metoprolol 5 mg IV.  Intravenous fluid hydration although careful for possibility of fluid overload.  Patient seems to be dry pushing patient into tachycardia.

## 2024-01-23 NOTE — ED PROVIDER NOTES
History  Chief Complaint   Patient presents with    Altered Mental Status     Pt coming from rehab after staff reported altered metal status     73-year-old woman with relevant past medical history of hypertension, GERD, and A-fib on Eliquis presents with altered mental status.  Family reports patient developed altered mental status at her living facility around 1700 today.  Her daughter reports patient normally calls her every day, and the patient did not call her today which prompted her to follow-up with the facility. When her daughter spoke with her, she found she was confused and not answering questions appropriately. Normally, the patient can have a normal conversation and is not confused. The patient herself is unable to provide any history secondary to her new onset confusion.        Prior to Admission Medications   Prescriptions Last Dose Informant Patient Reported? Taking?   Clobetasol Propionate E 0.05 % emollient cream  Self, Outside Facility (Specify) Yes No   Sig: APPLY TWICE A DAY FOR PALM SKIN DERMITITS   acetaminophen (TYLENOL) 325 mg tablet  Self, Outside Facility (Specify) No No   Sig: Take 2 tablets (650 mg total) by mouth every 4 (four) hours as needed for mild pain, headaches or fever.   albuterol (Ventolin HFA) 90 mcg/act inhaler  Outside Facility (Specify) No No   Sig: Inhale 2 puffs every 6 (six) hours as needed for wheezing   alendronate (FOSAMAX) 70 mg tablet  Self, Outside Facility (Specify) Yes No   Sig: Take by mouth every 7 days    apixaban (ELIQUIS) 5 mg  Outside Facility (Specify) No No   Sig: Take 1 tablet (5 mg total) by mouth 2 (two) times a day   cholecalciferol (VITAMIN D3) 1,000 units tablet  Outside Facility (Specify) No No   Sig: Take 1 tablet (1,000 Units total) by mouth daily   clobetasol (TEMOVATE) 0.05 % cream   No No   Sig: Apply topically 2 (two) times a day   furosemide (LASIX) 40 mg tablet   No No   Sig: Take 1 tablet (40 mg total) by mouth daily   gabapentin  (Neurontin) 100 mg capsule  Outside Facility (Specify) No No   Sig: Take 1 capsule in AM and 3 capsules in PM   hydrocortisone 2.5 % ointment   No No   Sig: Apply topically 4 (four) times a day as needed for irritation   hydroxychloroquine (PLAQUENIL) 200 mg tablet  Outside Facility (Specify) No No   Sig: Take 1 tablet (200 mg total) by mouth 2 (two) times a day   ketoconazole (NIZORAL) 2 % cream  Self, Outside Facility (Specify) No No   Sig: Apply topically 2 (two) times a day   lidocaine (LIDODERM) 5 %   No No   Sig: Apply 2 patches topically daily Remove & Discard patch within 12 hours or as directed by MD Do not start before December 20, 2022.   metoprolol tartrate (LOPRESSOR) 25 mg tablet  Outside Facility (Specify) No No   Sig: Take 1 tablet (25 mg total) by mouth every 12 (twelve) hours   nystatin (MYCOSTATIN) powder  Outside Facility (Specify) No No   Sig: Apply topically 2 (two) times a day   omeprazole (PriLOSEC) 20 mg delayed release capsule  Outside Facility (Specify) No No   Sig: Take 1 capsule (20 mg total) by mouth daily   polyethylene glycol (MIRALAX) 17 g packet  Outside Facility (Specify) No No   Sig: Take 17 g by mouth daily Do not start before November 26, 2022.   potassium chloride (K-DUR,KLOR-CON) 10 mEq tablet  Outside Facility (Specify) No No   Sig: Take 1 tablet (10 mEq total) by mouth daily   predniSONE 5 mg tablet   No No   Sig: Take 1 tablet (5 mg total) by mouth 2 (two) times a day with meals Do not start before June 5, 2023.   senna-docusate sodium (SENOKOT S) 8.6-50 mg per tablet  Outside Facility (Specify) No No   Sig: Take 1 tablet by mouth daily   triamcinolone (KENALOG) 0.1 % cream  Outside Facility (Specify) No No   Sig: Apply 1 application topically 2 (two) times a day for 14 days To affected area   white petrolatum-mineral oil (EUCERIN,HYDROCERIN) cream  Self, Outside Facility (Specify) No No   Sig: Apply topically 3 (three) times a day      Facility-Administered Medications:  None       Past Medical History:   Diagnosis Date    Abnormal thyroid function test     last assessed: 2015     Arthritis     Caries     last assessed: 2016     Continuous opioid dependence (HCC) 2021    Edema of right lower extremity     last assessed: 2015     GERD (gastroesophageal reflux disease)     Hypertension     Medicare annual wellness visit, subsequent 2021    Positive blood culture 3/11/2022    Sarcoid        Past Surgical History:   Procedure Laterality Date     SECTION      MULTIPLE TOOTH EXTRACTIONS N/A 2016    Procedure: Surgical extraction of teeth 2, 18, 19, 30, 31; incision and drainage of left subperiosteal abscess ;  Surgeon: Clara Cevallos DMD;  Location: [facility];  Service:        Family History   Problem Relation Age of Onset    Asthma Mother     Stroke Mother     No Known Problems Father     No Known Problems Sister     No Known Problems Brother     No Known Problems Maternal Grandmother     No Known Problems Maternal Grandfather     No Known Problems Paternal Grandmother     No Known Problems Paternal Grandfather     Diabetes Maternal Aunt     Breast cancer Cousin     Diabetes Cousin     Breast cancer Other      I have reviewed and agree with the history as documented.    E-Cigarette/Vaping    E-Cigarette Use Never User      E-Cigarette/Vaping Substances    Nicotine No     THC No     CBD No     Flavoring No     Other No     Unknown No      Social History     Tobacco Use    Smoking status: Never    Smokeless tobacco: Never   Vaping Use    Vaping status: Never Used   Substance Use Topics    Alcohol use: Not Currently    Drug use: No        Review of Systems    Physical Exam  ED Triage Vitals   Temperature Pulse Respirations Blood Pressure SpO2   24 2314 24 0200 24 0200 24 0200 24 0200   (!) 101.4 °F (38.6 °C) (!) 132 (!) 28 128/58 98 %      Temp Source Heart Rate Source Patient Position - Orthostatic VS BP  Location FiO2 (%)   01/22/24 2314 -- -- -- --   Rectal          Pain Score       --                    Orthostatic Vital Signs  Vitals:    01/23/24 0200 01/23/24 0230 01/23/24 0245 01/23/24 0315   BP: 128/58 146/67 132/69 151/79   Pulse: (!) 132 (!) 136 (S) (!) 142 (!) 134       Physical Exam  Vitals and nursing note reviewed.   Constitutional:       Appearance: Normal appearance. She is obese.      Comments: Chronically ill-appearing   HENT:      Head: Normocephalic and atraumatic.      Right Ear: External ear normal.      Left Ear: External ear normal.      Nose: Nose normal. No congestion.   Cardiovascular:      Rate and Rhythm: Tachycardia present. Rhythm irregular.   Pulmonary:      Effort: Pulmonary effort is normal. No respiratory distress.      Breath sounds: Normal breath sounds.   Abdominal:      Palpations: Abdomen is soft.      Tenderness: There is abdominal tenderness in the right lower quadrant, suprapubic area and left lower quadrant. There is no guarding or rebound.   Musculoskeletal:         General: Normal range of motion.      Cervical back: Normal range of motion and neck supple.   Skin:     General: Skin is warm and dry.      Comments: Diffuse skin peeling of the entire skin. Erythematous patchy areas of skin throughout the body. No discrete areas of cellulitis. No sacral wounds.    Neurological:      Comments: Confused and awakens to name. Prefers to lay with eyes closed. Answers yes and no questions. Unable to provide history secondary to confusion.         ED Medications  Medications   albuterol (PROVENTIL HFA,VENTOLIN HFA) inhaler 2 puff (has no administration in time range)   apixaban (ELIQUIS) tablet 5 mg (has no administration in time range)   cholecalciferol (VITAMIN D3) tablet 1,000 Units (has no administration in time range)   hydroxychloroquine (PLAQUENIL) tablet 200 mg (has no administration in time range)   metoprolol tartrate (LOPRESSOR) tablet 25 mg (has no administration in time  range)   pantoprazole (PROTONIX) EC tablet 20 mg (has no administration in time range)   predniSONE tablet 5 mg (has no administration in time range)   white petrolatum-mineral oil (EUCERIN,HYDROCERIN) cream (has no administration in time range)   multi-electrolyte (ISOLYTE-S PH 7.4) bolus 500 mL (has no administration in time range)   acetaminophen (TYLENOL) tablet 650 mg (has no administration in time range)   docusate sodium (COLACE) capsule 100 mg (has no administration in time range)   ondansetron (ZOFRAN) injection 4 mg (has no administration in time range)   aluminum-magnesium hydroxide-simethicone (MAALOX) oral suspension 30 mL (has no administration in time range)   cefepime (MAXIPIME) 2 g/50 mL dextrose IVPB (has no administration in time range)   vancomycin (VANCOCIN) 1,750 mg in sodium chloride 0.9 % 500 mL IVPB (has no administration in time range)   vancomycin (VANCOCIN) 2,000 mg in sodium chloride 0.9 % 500 mL IVPB (has no administration in time range)   cefepime (MAXIPIME) 2 g/50 mL dextrose IVPB (0 mg Intravenous Stopped 1/23/24 0202)   sodium chloride 0.9 % bolus 500 mL (500 mL Intravenous New Bag 1/23/24 0230)   metoprolol (LOPRESSOR) injection 5 mg (5 mg Intravenous Given 1/23/24 0311)       Diagnostic Studies  Results Reviewed       Procedure Component Value Units Date/Time    MRSA culture [[id number]]     Lab Status: No result Specimen: Nares     Lactic acid 2 Hours [[id number]]  (Abnormal) Collected: 01/23/24 0232    Lab Status: Final result Specimen: Blood from Arm, Right Updated: 01/23/24 0306     LACTIC ACID 2.3 mmol/L     Narrative:      Result may be elevated if tourniquet was used during collection.    Urinalysis with microscopic [[id number]]     Lab Status: No result Specimen: Urine, Clean Catch     Urine Microscopic [[id number]]  (Abnormal) Collected: 01/23/24 0145    Lab Status: Final result Specimen: Urine, Indwelling Corey Catheter Updated: 01/23/24 0234     RBC, UA 10-20 /hpf      WBC,  UA 2-4 /hpf      Epithelial Cells Occasional /hpf      Bacteria, UA None Seen /hpf      Hyaline Casts, UA 0-3 /lpf     UA w Reflex to Microscopic w Reflex to Culture [[id number]]  (Abnormal) Collected: 01/23/24 0145    Lab Status: Final result Specimen: Urine, Indwelling Corey Catheter Updated: 01/23/24 0231     Color, UA Light Yellow     Clarity, UA Clear     Specific Gravity, UA 1.013     pH, UA 5.5     Leukocytes, UA Trace     Nitrite, UA Negative     Protein, UA Negative mg/dl      Glucose, UA Negative mg/dl      Ketones, UA Negative mg/dl      Urobilinogen, UA <2.0 mg/dl      Bilirubin, UA Negative     Occult Blood, UA Trace    FLU/RSV/COVID - if FLU/RSV clinically relevant [[id number]]  (Normal) Collected: 01/23/24 0013    Lab Status: Final result Specimen: Nares from Nose Updated: 01/23/24 0135     SARS-CoV-2 Negative     INFLUENZA A PCR Negative     INFLUENZA B PCR Negative     RSV PCR Negative    Narrative:      FOR PEDIATRIC PATIENTS - copy/paste COVID Guidelines URL to browser: [email or web address]    SARS-CoV-2 assay is a Nucleic Acid Amplification assay intended for the  qualitative detection of nucleic acid from SARS-CoV-2 in nasopharyngeal  swabs. Results are for the presumptive identification of SARS-CoV-2 RNA.    Positive results are indicative of infection with SARS-CoV-2, the virus  causing COVID-19, but do not rule out bacterial infection or co-infection  with other viruses. Laboratories within the [place] and its  territories are required to report all positive results to the appropriate  public health authorities. Negative results do not preclude SARS-CoV-2  infection and should not be used as the sole basis for treatment or other  patient management decisions. Negative results must be combined with  clinical observations, patient history, and epidemiological information.  This test has not been FDA cleared or approved.    This test has  been authorized by FDA under an Emergency Use Authorization  (EUA). This test is only authorized for the duration of time the  declaration that circumstances exist justifying the authorization of the  emergency use of an in vitro diagnostic tests for detection of SARS-CoV-2  virus and/or diagnosis of COVID-19 infection under section 564(b)(1) of  the Act, 21 U.S.C. 360bbb-3(b)(1), unless the authorization is terminated  or revoked sooner. The test has been validated but independent review by FDA  and CLIA is pending.    Test performed using [employer] GeneXpert: This RT-PCR assay targets N2,  a region unique to SARS-CoV-2. A conserved region in the E-gene was chosen  for pan-Sarbecovirus detection which includes SARS-CoV-2.    According to CMS-2020-01-R, this platform meets the definition of high-throughput technology.    Procalcitonin [[id number]]  (Normal) Collected: 01/23/24 0013    Lab Status: Final result Specimen: Blood from Arm, Right Updated: 01/23/24 0106     Procalcitonin 0.17 ng/ml     Protime-INR [[id number]]  (Abnormal) Collected: 01/23/24 0013    Lab Status: Final result Specimen: Blood from Arm, Right Updated: 01/23/24 0103     Protime 17.5 seconds      INR 1.46    APTT [[id number]]  (Normal) Collected: 01/23/24 0013    Lab Status: Final result Specimen: Blood from Arm, Right Updated: 01/23/24 0103     PTT 29 seconds     Lactic acid [[id number]]  (Abnormal) Collected: 01/23/24 0013    Lab Status: Final result Specimen: Blood from Arm, Right Updated: 01/23/24 0102     LACTIC ACID 2.3 mmol/L     Narrative:      Result may be elevated if tourniquet was used during collection.    Comprehensive metabolic panel [[id number]]  (Abnormal) Collected: 01/23/24 0013    Lab Status: Final result Specimen: Blood from Arm, Right Updated: 01/23/24 0059     Sodium 140 mmol/L      Potassium 4.7 mmol/L      Chloride 101 mmol/L      CO2 28 mmol/L      ANION GAP 11 mmol/L      BUN 19 mg/dL      Creatinine 1.77 mg/dL      Glucose  76 mg/dL      Calcium 9.6 mg/dL      Corrected Calcium 10.1 mg/dL      AST 30 U/L      ALT 24 U/L      Alkaline Phosphatase 53 U/L      Total Protein 7.6 g/dL      Albumin 3.4 g/dL      Total Bilirubin 0.94 mg/dL      eGFR 27 ml/min/1.73sq m     Narrative:      National Kidney Disease Foundation guidelines for Chronic Kidney Disease (CKD):     Stage 1 with normal or high GFR (GFR > 90 mL/min/1.73 square meters)    Stage 2 Mild CKD (GFR = 60-89 mL/min/1.73 square meters)    Stage 3A Moderate CKD (GFR = 45-59 mL/min/1.73 square meters)    Stage 3B Moderate CKD (GFR = 30-44 mL/min/1.73 square meters)    Stage 4 Severe CKD (GFR = 15-29 mL/min/1.73 square meters)    Stage 5 End Stage CKD (GFR <15 mL/min/1.73 square meters)  Note: GFR calculation is accurate only with a steady state creatinine    CBC and differential [[id number]]  (Abnormal) Collected: 01/23/24 0013    Lab Status: Final result Specimen: Blood from Arm, Right Updated: 01/23/24 0052     WBC 6.99 Thousand/uL      RBC 5.13 Million/uL      Hemoglobin 14.2 g/dL      Hematocrit 46.6 %      MCV 91 fL      MCH 27.7 pg      MCHC 30.5 g/dL      RDW 14.5 %      MPV 12.4 fL      Platelets 212 Thousands/uL      nRBC 0 /100 WBCs      Neutrophils Relative 72 %      Immat GRANS % 1 %      Lymphocytes Relative 19 %      Monocytes Relative 6 %      Eosinophils Relative 2 %      Basophils Relative 0 %      Neutrophils Absolute 5.06 Thousands/µL      Immature Grans Absolute 0.05 Thousand/uL      Lymphocytes Absolute 1.30 Thousands/µL      Monocytes Absolute 0.41 Thousand/µL      Eosinophils Absolute 0.16 Thousand/µL      Basophils Absolute 0.01 Thousands/µL     Blood culture #1 [[id number]] Collected: 01/23/24 0013    Lab Status: In process Specimen: Blood from Arm, Right Updated: 01/23/24 0026    Blood culture #2 [[id number]] Collected: 01/23/24 0013    Lab Status: In process Specimen: Blood from Arm, Right Updated: 01/23/24 0026                   CT chest abdomen pelvis wo  "contrast   Final Result by Donald Stark MD (01/23 0155)         1. No evidence of acute cardiopulmonary process.   2. Findings compatible with constipation. No evidence of colitis, diverticulitis or bowel obstruction.               Workstation performed: [id number]               Procedures  Procedures      ED Course                          Initial Sepsis Screening       Row Name 01/23/24 0235                Is the patient's history suggestive of a new or worsening infection? Yes (Proceed)  -JS        Suspected source of infection suspect infection, source unknown  -JS        Indicate SIRS criteria Hyperthemia > 38.3C (100.9F) OR Hypothermia <36C (96.8F);Tachycardia > 90 bpm  -JS        Are two or more of the above signs & symptoms of infection both present and new to the patient? Yes (Proceed)  -JS        Assess for evidence of organ dysfunction: Are any of the below criteria present within 6 hours of suspected infection and SIRS criteria that are NOT considered to be chronic conditions? Lactate > 2.0  -JS        Date of presentation of severe sepsis 01/23/24  -JS        Time of presentation of severe sepsis 0102  -JS        Sepsis Note: Click \"NEXT\" below (NOT \"close\") to generate sepsis note based on above information. YES (proceed by clicking \"NEXT\")  -JS                  User Key  (r) = Recorded By, (t) = Taken By, (c) = Cosigned By      Initials Name Provider Type    JS Kt Silva MD Resident                              Medical Decision Making  Presents with confusion. Patient unable to provide history secondary to her new onset confusion. This does seem to be a notable difference from her baseline mental status as per daughter.    DDX: at risk for infection from pneumonia and UTI, hyponatremia, viral infection    Plan: sepsis labs, ECG, CT CAP, mild fluids given hx of CHF    Final disposition: mildly elevated lactic acid. CT CAP without obvious area of infection. Viral testing unremarkable. " "No evidence of UTI. Patient given 500 mL of fluids for a fib RVR and lactic acidosis. I did not want to give her too much fluid given hx of CHF. A fib RVR likely a compensatory mechanism given acute illness. Cefepime given for empiric treatment of sepsis. Will admit to medicine for AMS and sepsis. Patient and daughter in agreement with plan and questions were answered.     Previous charting was reviewed.  History obtained from daughter and EMS.    Portions or all of this note were generated using voice recognition software.  Occasional wrong word or \"sound a like\" substitutions may have occurred due to the inherent limitations of voice recognition software.  Please interpret any errors within the intended context of the whole sentence or idea.      Amount and/or Complexity of Data Reviewed  Labs: ordered.  Radiology: ordered.    Risk  Decision regarding hospitalization.          Disposition  Final diagnoses:   Sepsis (HCC)   Atrial fibrillation with RVR (HCC)   Altered mental status     Time reflects when diagnosis was documented in both MDM as applicable and the Disposition within this note       Time User Action Codes Description Comment    1/23/2024  2:35 AM Kt Silva [A41.9] Sepsis (HCC)     1/23/2024  2:35 AM Kt Silva [I48.91] Atrial fibrillation with RVR (HCC)     1/23/2024  2:35 AM Kt Silva [R41.82] Altered mental status           ED Disposition       ED Disposition   Admit    Condition   Stable    Date/Time   Tue Jan 23, 2024  2:34 AM    Comment   Case was discussed with XENIA and the patient's admission status was agreed to be Admission Status: inpatient status to the service of Dr. Do.               Follow-up Information    None         Patient's Medications   Discharge Prescriptions    No medications on file     No discharge procedures on file.    PDMP Review         Value Time User    PDMP Reviewed  Yes 5/26/2023 11:45 PM Kb Sethi, DO             ED " Provider  Attending physically available and evaluated Bhavna JANUSZ Al. I managed the patient along with the ED Attending.    Electronically Signed by           Kt Silva MD  01/23/24 6048

## 2024-01-23 NOTE — ASSESSMENT & PLAN NOTE
Patient came in with fever that started approximately 5 in the afternoon with change in mental status.  Procalcitonin normal although lactic acid 2.3.  Has congestive heart failure as background history; cannot give full sepsis fluids.  Started on cefepime; as patient also has dermatitis and could be portal of entry for skin infection, added vancomycin.  Infectious disease consult.  Pharmacy consult.  Trend lactic acid.  Trend procalcitonin.  Blood cultures sent and to follow.  Follow CBC with differential with metabolic profile.

## 2024-01-23 NOTE — SPEECH THERAPY NOTE
"Speech-Language Pathology Bedside Swallow Evaluation      Patient Name: Bhavna Al    Today's Date: 2024     Problem List  Principal Problem:    Severe sepsis (HCC)  Active Problems:    Rheumatoid arthritis (HCC)    Benign essential hypertension    Vitamin D deficiency    Paroxysmal atrial fibrillation (HCC)    Chronic diastolic heart failure (HCC)    Ambulatory dysfunction    Abnormal urinalysis    Dermatitis associated with incontinence    Chronic kidney disease      Past Medical History  Past Medical History:   Diagnosis Date    Abnormal thyroid function test     last assessed: 2015     Arthritis     Caries     last assessed: 2016     Continuous opioid dependence (HCC) 2021    Edema of right lower extremity     last assessed: 2015     GERD (gastroesophageal reflux disease)     Hypertension     Medicare annual wellness visit, subsequent 2021    Positive blood culture 3/11/2022    Sarcoid        Past Surgical History  Past Surgical History:   Procedure Laterality Date     SECTION      MULTIPLE TOOTH EXTRACTIONS N/A 2016    Procedure: Surgical extraction of teeth 2, 18, 19, 30, 31; incision and drainage of left subperiosteal abscess ;  Surgeon: Clara Cevallos DMD;  Location: [facility];  Service:      Summary   Pt presented with mild oral and suspected mild pharyngeal dysphagia. Pt is assessed with puree and thin liquids. She is lethargic upon entry. Oral care is provided with a wet swab. Reduced oral control is observed with anterior leakage for thin liquid trials. Puree solids need to be \"scraped\" into oral cavity. Pt requires cueing to initiate swallow due to increased lethargy. No other overt s/s of aspiration. Suspect swallow to be functional when alertness improves. Currently at increased risk of aspiration due to decreased mental status.     Risk/s for Aspiration: low     Recommended Diet: NPO except medications   Recommended Form of Meds: crushed " with puree   Aspiration precautions and swallowing strategies: only feed when fully alert  Other Recommendations: Continue frequent oral care    Current Medical Status  Chief Complaint:     Fever, altered mental status, lethargy  History of Present Illness:  Bhavna Al is a 73 y.o. female who has past medical history significant for morbid obesity with dermatitis currently in nursing facility for continued rehab, history of GERD, benign essential hypertension, bronchitis, chronic diastolic heart failure, dermatitis, hyperuricemia, lumbar radiculopathy, rheumatoid arthritis who comes to the emergency room sent by nursing facility due to finding of fever with altered mental status as of 5 PM yesterday.     According to daughter and granddaughter who are in the room, patient normally is very active and talking coherently.  However as of the fever last night she is more lethargic and although currently will sometimes open her eyes, she does not indulge in any conversation.  According to granddaughter last time that she was like so, she had some fluids with good response to her mental status.  Currently she does not give any history.  Laboratory studies were done which showed presence of RBCs and some WBCs but no bacteria in urine.  Her procalcitonin is normal but her lactic acid is elevated at 2.3.  That said, she was then started on cefepime.  She was then given some fluids albeit carefully first at 250 mL x 1 and followed by 500 mL x 1.     Currently, patient is lying flat on bed.  She is generally lethargic but otherwise opens her eyes spontaneously.  She does not appear to be in any distress.    Current Precautions:  Fall  Aspiration     Allergies:  shellfish    Past medical history:  Please see H&P for details    Special Studies:  VBS 1/18/24-   Summary:  Images are on PACS for review.   Pt presents w/ mild oropharyngeal dysphagia venkata by mildly prolonged mastication and oral manipulation, delayed swallow  "initiation, and min-mild pharyngeal residue. Pt w/ slow transfer of material. All material spills to the valleculae prior to delayed hyo-laryngeal excursion. Min-mild vallecular retention noted after the swallow w/ subsequent pooling in the pyriforms. No penetration or aspiration on today's study. Brief screening of the esophagus revealed slow motility and retention.         Recommendations:  Diet: Regular   Liquids: Thin   Meds: As tolerated  Strategies: Mult. Swallows   Upright position  F/u ST tx: Yes, brief to review VBS findings and recommendations   Therapy Prognosis: Good  Prognosis considerations: Age, current medical, past medical, cognitive status   Aspiration Precautions   Reflux Precautions  Consider consult with: GI   Results reviewed with: pt, nursing  Aspiration precautions posted.  If a dedicated assessment of the esophagus is desired, consider esophagram/barium swallow or EGD.    Social/Education/Vocational Hx:  Pt lives in SNF/ECF    Swallow Information   Current Risks for Dysphagia & Aspiration: known history of dysphagia  Current Symptoms/Concerns:  increased lethargy  Current Diet: NPO   Baseline Diet: regular diet and thin liquids    Baseline Assessment   Behavior/Cognition: lethargic  Speech/Language Status: able to participate in basic conversation  Patient Positioning: upright in bed  Pain Status/Interventions/Response to Interventions: No report of or nonverbal indications of pain.    Swallow Mechanism Exam  Facial: symmetrical  Labial: decreased strength  Lingual: WFL  Velum: unable to visualize  Mandible: unable to to open mouth  Dentition: adequate  Vocal quality:weak     Consistencies Assessed and Performance   Consistencies Administered: thin liquids and puree  Materials administered included thin liquids and applesauce     Oral Stage: mild  Reduced oral control resulting in materials being \"scraped\" into oral cavity. Bolus formation and transfer were delayed. No overt s/s reduced oral " control.    Pharyngeal Stage: mild  Swallow Mechanics:  Swallowing initiation appeared delayed. Laryngeal rise was palpated and judged to be within functional limits.  No coughing, throat clearing, change in vocal quality or respiratory status noted today.     Esophageal Concerns: none reported     Summary and Recommendations (see above)    Results Reviewed with: RN and family     Treatment Recommended: Dysphagia therapy.      Frequency of treatment: 2-3x week     Patient Stated Goal: none stated.     Dysphagia LTG  -Patient will demonstrate safe and effective oral intake (without overt s/s significant oral/pharyngeal dysphagia including s/s penetration or aspiration) for the highest appropriate diet level.     Short Term Goals:  -Patient will tolerate trials of upgraded food and/or liquid texture with no significant s/s of oral or pharyngeal dysphagia including aspiration across 1-3 diagnostic sessions     Speech Therapy Prognosis   Prognosis: good    Prognosis Considerations: age, medical status, and prior medical history

## 2024-01-23 NOTE — CONSULTS
Consultation - Infectious Disease   Bhavna Al 73 y.o. female MRN: [id number]  Unit/Bed#: ED 29 Encounter: [id number]      IMPRESSION & RECOMMENDATIONS:   1.  Systemic inflammatory response syndrome.  Present on admission with fever and tachycardia.  Consideration for the possibly of an occult source of sepsis.  Consideration for the possibility of noninfectious etiology.  The patient does have significant rash with areas of potential cutaneous breakdown that could lead to a transient bacteremia.  The patient remains hemodynamically stable despite her systemic illness.  -Continue vancomycin and cefepime for now  -Pharmacy follow-up for vancomycin dose management  -Follow-up blood cultures  -Recheck CBC with differential and CMP to make sure no developing antibiotic associated toxicities or worsening infection  -Recheck procalcitonin level to look for other evidence of an acute bacterial process  -Additional workup as needed    2.  Acute encephalopathy.  In the setting of an acute febrile illness of unknown etiology.  No headache or stiff neck to suggest a primary CNS infection.  Suspect multifactorial in this patient with baseline cognitive issues.  -Monitor cognition  -Antibiotics as above for now  -Additional workup as needed    3.  Psoriasis.  Without any recent directed therapy.  Could be the nidus for translocation of bacteria into the bloodstream.  -Treatment as per the primary  -Consider dermatology evaluation    4.  Atrial fibrillation.  With a rapid ventricular response.  On rate control and anticoagulation    5.  Acute kidney injury.  Complicating chronic kidney disease.  Stage III.  Suspect prerenal issues playing a significant role.  Consideration for the possibility of sepsis playing a role.  -Recheck BMP  -Dose adjusted antibiotics  -Volume management    6.  Rheumatoid arthritis.  On low-dose prednisone and hydroxychloroquine  -Low threshold for stress dose steroids    Have discussed the above  management plan in detail with the primary service    Extensive review of the medical records in epic including review of the notes, radiographs, and laboratory results     HISTORY OF PRESENT ILLNESS:  Reason for Consult: Sepsis  HPI: Bhavna Al is a 73 y.o. year old female with rheumatoid arthritis, psoriasis, sarcoidosis, obesity admitted to [facility] with decreased mental status and fever who we are asked to assist with management.  Patient is nursing home resident but he is usually conversant and interactive.  According to the patient's daughter she has been hospitalized approximately 5 times in the last year.  Each time is for different reason although she tends to have cognitive decline during those hospitalizations.  Apparently she recently began developing decreased cognition and was found to have a fever and therefore was sent to the hospital for further evaluation.  According to the daughter the patient had not been complaining of anything.  In the emergency department the patient was found to be febrile and tachycardic, had blood cultures obtained and was started on vancomycin and cefepime and is now admitted for further management.  Urinalysis was not consistent with a UTI.  CT of the chest abdomen pelvis did not reveal any localizing source of infection.  She was found to have a mild lactic acidosis but a normal procalcitonin level.  The patient's received IV fluid resuscitation and now seems a bit more interactive although is still quite sleepy.  Because the person's cognitive state most of the history is through chart review and discussion with the patient's daughter.  She is able to answer simple yes/no questions and tell me she is not having any headache, no abdominal or chest pain, no nausea vomiting or diarrhea, no dysuria or hematuria.  She now has a Corey catheter in place.  She does complain of some mild respiratory symptoms but no cough or sputum  production.    REVIEW OF SYSTEMS:  A complete review of systems is negative other than that noted in the HPI.    PAST MEDICAL HISTORY:  Past Medical History:   Diagnosis Date    Abnormal thyroid function test     last assessed: 2015     Arthritis     Caries     last assessed: 2016     Continuous opioid dependence (HCC) 2021    Edema of right lower extremity     last assessed: 2015     GERD (gastroesophageal reflux disease)     Hypertension     Medicare annual wellness visit, subsequent 2021    Positive blood culture 3/11/2022    Sarcoid      Past Surgical History:   Procedure Laterality Date     SECTION      MULTIPLE TOOTH EXTRACTIONS N/A 2016    Procedure: Surgical extraction of teeth 2, 18, 19, 30, 31; incision and drainage of left subperiosteal abscess ;  Surgeon: Clara Cevallos DMD;  Location: [facility];  Service:        FAMILY HISTORY:  Non-contributory    SOCIAL HISTORY:  Social History   Social History     Substance and Sexual Activity   Alcohol Use Not Currently     Social History     Substance and Sexual Activity   Drug Use No     Social History     Tobacco Use   Smoking Status Never   Smokeless Tobacco Never       ALLERGIES:  Allergies   Allergen Reactions    Shellfish-Derived Products - Food Allergy Itching    Methotrexate Derivatives     Amoxicillin Rash    Ampicillin-Sulbactam Sodium Rash       MEDICATIONS:  All current active medications have been reviewed.  Antibiotics:.  Vancomycin and cefepime    PHYSICAL EXAM:  Temp:  [101.3 °F (38.5 °C)-101.4 °F (38.6 °C)] 101.3 °F (38.5 °C)  HR:  [104-142] 118  Resp:  [20-36] 20  BP: (101-151)/(52-80) 109/64  SpO2:  [91 %-100 %] 97 %  Temp (24hrs), Av.4 °F (38.6 °C), Min:101.3 °F (38.5 °C), Max:101.4 °F (38.6 °C)  Current: Temperature: (!) 101.3 °F (38.5 °C)    Intake/Output Summary (Last 24 hours) at 2024 1421  Last data filed at 2024 0621  Gross per 24 hour   Intake 1550 ml   Output --   Net  1550 ml       General Appearance:  Elderly, debilitated, somnolent but arousable, nontoxic   Head:  Normocephalic, without obvious abnormality, atraumatic   Eyes:  Conjunctiva pink and sclera anicteric, both eyes   Nose: Nares normal, mucosa normal, no drainage   Throat: Oropharynx moist without lesions   Neck: Supple, symmetrical, no adenopathy, no tenderness/mass/nodules   Back:   Symmetric, no curvature, ROM normal, no CVA tenderness   Lungs:   Clear to auscultation bilaterally, respirations unlabored   Chest Wall:  No tenderness or deformity   Heart:  Tachycardic; no murmur, rub or gallop   Abdomen:   Soft, non-tender, non-distended, positive bowel sounds    Extremities: No cyanosis, clubbing or edema   Skin: Diffuse superficial erythematous rash involving the extremities and torso.   Lymph nodes: Cervical, supraclavicular nodes normal   Neurologic: Somnolent, arousable, answer simple yes/no questions       LABS, IMAGING, & OTHER STUDIES:  Lab Results:  I have personally reviewed pertinent labs.  Results from last 7 days   Lab Units 01/23/24  0549 01/23/24  0013   WBC Thousand/uL 8.62 6.99   HEMOGLOBIN g/dL 12.6 14.2   PLATELETS Thousands/uL 204 212     Results from last 7 days   Lab Units 01/23/24  0549 01/23/24  0013   SODIUM mmol/L 143 140   POTASSIUM mmol/L 4.2 4.7   CHLORIDE mmol/L 104 101   CO2 mmol/L 26 28   BUN mg/dL 19 19   CREATININE mg/dL 1.75* 1.77*   EGFR ml/min/1.73sq m 28 27   CALCIUM mg/dL 8.4 9.6   AST U/L 33 30   ALT U/L 21 24   ALK PHOS U/L 45 53     Results from last 7 days   Lab Units 01/23/24  0013   BLOOD CULTURE  Received in Microbiology Lab. Culture in Progress.  Received in Microbiology Lab. Culture in Progress.     Results from last 7 days   Lab Units 01/23/24  0013   PROCALCITONIN ng/ml 0.17                   Imaging Studies:     CT chest abdomen pelvis.  No area of dense consolidation.  No pleural effusion.  No acute abdominal or pelvic process    Images personally reviewed by me in  PACS

## 2024-01-23 NOTE — ASSESSMENT & PLAN NOTE
Unable to tolerate oral intake and did not receive oral metoprolol, A-fib RVR at time of evaluation 1/23   As needed IV metoprolol   Monitor on telemetry until rate controlled   Continue PTA Eliquis

## 2024-01-23 NOTE — ASSESSMENT & PLAN NOTE
Presented with metabolic encephalopathy, meeting sepsis criteria   Suspected source cellulitis   Cefepime/vancomycin pending cultures   Appreciate infectious disease consultation   Pharmacy consult.  Blood cultures pending  Trend fever curve, leukocytosis

## 2024-01-23 NOTE — ASSESSMENT & PLAN NOTE
Patient on Lopressor 25 mg every 12.  She also has atrial fibrillation; current heart rate in the 130s.  Metoprolol 5 mg IV x 1 given.

## 2024-01-23 NOTE — ASSESSMENT & PLAN NOTE
Lab Results   Component Value Date    EGFR 27 01/23/2024    EGFR 35 05/31/2023    EGFR 30 05/30/2023    CREATININE 1.77 (H) 01/23/2024    CREATININE 1.46 (H) 05/31/2023    CREATININE 1.63 (H) 05/30/2023   Patient has stage IIIb chronic kidney disease; currently with AMANDA, GFR in 27.  Will give slow hydration.  Although patient may have sepsis, cannot give full sepsis fluids due to history of heart failure.  Concern for fluid overload.

## 2024-01-23 NOTE — H&P
[facility]  H&P  Name: Bhavna Al 73 y.o. female I MRN: [id number]  Unit/Bed#: ED 29 I Date of Admission: 1/22/2024   Date of Service: 1/23/2024 I Hospital Day: 0      Assessment/Plan   * Sepsis (HCC)  Assessment & Plan  Patient came in with fever that started approximately 5 in the afternoon with change in mental status.  Procalcitonin normal although lactic acid 2.3.  Has congestive heart failure as background history; cannot give full sepsis fluids.  Started on cefepime; as patient also has dermatitis and could be portal of entry for skin infection, added vancomycin.  Infectious disease consult.  Pharmacy consult.  Trend lactic acid.  Trend procalcitonin.  Blood cultures sent and to follow.  Follow CBC with differential with metabolic profile.    Abnormal urinalysis  Assessment & Plan  Urinalysis shows more of RBCs than WBCs with no bacteria.  Still qualified as abnormal, the patient does not have overt findings of urinary tract infection (pyuria etc.)  Patient has chronic dermatitis and currently has stage I sacral decubitus ulceration.  Questionable presence of cellulitis as source of infection.  Started in ER with cefepime.  Added vancomycin.  See sepsis section.    Dermatitis associated with incontinence  Assessment & Plan  Patient with dermatitis.  Continue Eucerin.  In order to avoid further skin infection possibly portal of entry being sites of skin breakdown, will put Corey catheter.    Chronic kidney disease  Assessment & Plan  Lab Results   Component Value Date    EGFR 27 01/23/2024    EGFR 35 05/31/2023    EGFR 30 05/30/2023    CREATININE 1.77 (H) 01/23/2024    CREATININE 1.46 (H) 05/31/2023    CREATININE 1.63 (H) 05/30/2023   Patient has stage IIIb chronic kidney disease; currently with AMANDA, GFR in 27.  Will give slow hydration.  Although patient may have sepsis, cannot give full sepsis fluids due to history of heart failure.  Concern for fluid  overload.    Chronic diastolic heart failure (HCC)  Assessment & Plan  Wt Readings from Last 3 Encounters:   10/06/23 117 kg (258 lb)   06/01/23 117 kg (258 lb)   12/16/22 120 kg (264 lb 15.9 oz)   Patient is on furosemide at home.  Currently will put that on hold as she appears dehydrated and possibly with septic condition.            Paroxysmal atrial fibrillation (HCC)  Assessment & Plan  Continue metoprolol 25 mg every 12.  With tachycardia with heart rate of 130s; atrial fibrillation in RVR, will administer metoprolol 5 mg IV.  Intravenous fluid hydration although careful for possibility of fluid overload.  Patient seems to be dry pushing patient into tachycardia.    Vitamin D deficiency  Assessment & Plan  Continue cholecalciferol.    Benign essential hypertension  Assessment & Plan  Patient on Lopressor 25 mg every 12.  She also has atrial fibrillation; current heart rate in the 130s.  Metoprolol 5 mg IV x 1 given.    Rheumatoid arthritis (HCC)  Assessment & Plan  Continue prednisone 5 mg twice daily.  On hydroxychloroquine 200 mg twice daily.             VTE Prophylaxis: Apixaban (Eliquis)  / sequential compression device   Code Status: Level 1 - Full Code as discussed with daughter and granddaughter in the room  POLST: There is no POLST form on file for this patient (pre-hospital)    Anticipated Length of Stay:  Patient will be admitted on an Inpatient basis with an anticipated length of stay of greater than 2 midnights.   Justification for Hospital Stay: Please see detailed plans noted above.    Chief Complaint:     Fever, altered mental status, lethargy  History of Present Illness:  Bhavna Al is a 73 y.o. female who has past medical history significant for morbid obesity with dermatitis currently in nursing facility for continued rehab, history of GERD, benign essential hypertension, bronchitis, chronic diastolic heart failure, dermatitis, hyperuricemia, lumbar radiculopathy, rheumatoid arthritis who  comes to the emergency room sent by nursing facility due to finding of fever with altered mental status as of 5 PM yesterday.    According to daughter and granddaughter who are in the room, patient normally is very active and talking coherently.  However as of the fever last night she is more lethargic and although currently will sometimes open her eyes, she does not indulge in any conversation.  According to granddaughter last time that she was like so, she had some fluids with good response to her mental status.  Currently she does not give any history.  Laboratory studies were done which showed presence of RBCs and some WBCs but no bacteria in urine.  Her procalcitonin is normal but her lactic acid is elevated at 2.3.  That said, she was then started on cefepime.  She was then given some fluids albeit carefully first at 250 mL x 1 and followed by 500 mL x 1.    Currently, patient is lying flat on bed.  She is generally lethargic but otherwise opens her eyes spontaneously.  She does not appear to be in any distress.    Review of Systems:  Review of systems limited  Constitutional: Fever as noted  Eyes:  Denies change in visual acuity   HENT:  Denies nasal congestion or sore throat   Respiratory:  Denies cough or shortness of breath   Cardiovascular:  Denies chest pain or edema   GI:  Denies abdominal pain, nausea, vomiting, bloody stools or diarrhea   :  Denies dysuria   Musculoskeletal:  Denies back pain or joint pain   Integument: Overall body rash with drying skin  Neurologic:  Denies headache, focal weakness or sensory changes   Endocrine:  Denies polyuria or polydipsia   Psychiatric:  Denies depression or anxiety     Past Medical and Surgical History:   Past Medical History:   Diagnosis Date    Abnormal thyroid function test     last assessed: Sept 25, 2015     Arthritis     Caries     last assessed: Sept 9, 2016     Continuous opioid dependence (HCC) 12/2/2021    Edema of right lower extremity     last  assessed: 2015     GERD (gastroesophageal reflux disease)     Hypertension     Medicare annual wellness visit, subsequent 2021    Positive blood culture 3/11/2022    Sarcoid      Past Surgical History:   Procedure Laterality Date     SECTION      MULTIPLE TOOTH EXTRACTIONS N/A 2016    Procedure: Surgical extraction of teeth 2, 18, 19, 30, 31; incision and drainage of left subperiosteal abscess ;  Surgeon: Clara Cevallos DMD;  Location: [facility];  Service:        Meds/Allergies:  (Not in a hospital admission)      Allergies:   Allergies   Allergen Reactions    Shellfish-Derived Products - Food Allergy Itching    Methotrexate Derivatives     Amoxicillin Rash    Ampicillin-Sulbactam Sodium Rash     History:  Marital Status: Single   Patient Pre-hospital Living Situation: Currently lives in nursing facility  Patient Pre-hospital Level of Mobility: Needing assistance  Patient Pre-hospital Diet Restrictions: Regular  Substance Use History:   Social History     Substance and Sexual Activity   Alcohol Use Not Currently     Social History     Tobacco Use   Smoking Status Never   Smokeless Tobacco Never     Social History     Substance and Sexual Activity   Drug Use No       Family History:  Family History   Problem Relation Age of Onset    Asthma Mother     Stroke Mother     No Known Problems Father     No Known Problems Sister     No Known Problems Brother     No Known Problems Maternal Grandmother     No Known Problems Maternal Grandfather     No Known Problems Paternal Grandmother     No Known Problems Paternal Grandfather     Diabetes Maternal Aunt     Breast cancer Cousin     Diabetes Cousin     Breast cancer Other        Physical Exam:     Vitals:   Blood Pressure: 151/79 (24)  Pulse: (!) 134 (24)  Temperature: (!) 101.4 °F (38.6 °C) (244)  Temp Source: Rectal (244)  Respirations: (!) 33 (24)  SpO2: 98 % (24  0315)    Constitutional:  Well developed, well nourished, morbidly obese, no acute distress, non-toxic appearance   Eyes:  PERRL, conjunctiva normal   HENT:  Atraumatic, external ears normal, nose normal, oropharynx dry Neck- normal range of motion, no tenderness, supple   Respiratory:  No respiratory distress, normal breath sounds, no rales, no wheezing   Cardiovascular: Tachycardic, irregularly irregular rate and rhythm, no murmurs, no gallops, no rubs   GI:  Soft, nondistended, normal bowel sounds, nontender, no organomegaly, no mass, no rebound, no guarding   :  No costovertebral angle tenderness   Musculoskeletal:  No edema, no tenderness, no deformities. Back- no tenderness  Integument:  Well hydrated, overall drying skin eczematous, no overt ulcers seen however has a sacral decubitus stage I limited to breakdown of the dermal layer.  Non foul-smelling, no discharge noted  Lymphatic:  No lymphadenopathy noted   Neurologic: Spontaneously opens eyes, does not follow commands.  Psychiatric:  Speech and behavior  nil      Lab Results: I have personally reviewed pertinent reports.      Results from last 7 days   Lab Units 01/23/24  0013   WBC Thousand/uL 6.99   HEMOGLOBIN g/dL 14.2   HEMATOCRIT % 46.6*   PLATELETS Thousands/uL 212   NEUTROS PCT % 72   LYMPHS PCT % 19   MONOS PCT % 6   EOS PCT % 2     Results from last 7 days   Lab Units 01/23/24  0013   POTASSIUM mmol/L 4.7   CHLORIDE mmol/L 101   CO2 mmol/L 28   BUN mg/dL 19   CREATININE mg/dL 1.77*   CALCIUM mg/dL 9.6   ALK PHOS U/L 53   ALT U/L 24   AST U/L 30     Results from last 7 days   Lab Units 01/23/24  0013   INR  1.46*           Imaging: I have personally reviewed pertinent reports.      CT chest abdomen pelvis wo contrast    Result Date: 1/23/2024  Narrative: CT CHEST, ABDOMEN AND PELVIS WITHOUT IV CONTRAST INDICATION: Sepsis and abdominal pain. COMPARISON: 7/10/2022. TECHNIQUE: CT examination of the chest, abdomen and pelvis was performed without  intravenous contrast. Multiplanar 2D reformatted images were created from the source data. This examination, like all CT scans performed in the [facility], was performed utilizing techniques to minimize radiation dose exposure, including the use of iterative reconstruction and automated exposure control. Radiation dose length product (DLP) for this visit: 1954.07 mGy-cm Enteric Contrast: Not administered. FINDINGS: CHEST LUNGS: Significant respiratory motion artifact. No evidence of airspace consolidation or pulmonary edema. Probable vessel visualized on the prior exam in the right lower lobe. No evidence of pulmonary nodule no tracheal or endobronchial lesion. PLEURA: No effusion. HEART/GREAT VESSELS: Stable mild cardiomegaly. No thoracic aortic aneurysm MEDIASTINUM AND MICHAEL: No lymphadenopathy. CHEST WALL AND LOWER NECK: Unremarkable. ABDOMEN LIVER/BILIARY TREE: Hepatic steatosis and hepatomegaly. GALLBLADDER: No calcified gallstones. No pericholecystic inflammatory change. SPLEEN: Unremarkable. PANCREAS: Partial fatty replacement. ADRENAL GLANDS: Unremarkable. KIDNEYS/URETERS: Stable bilateral renal cysts. Punctate nonobstructing right renal calculus. No obstructive uropathy. STOMACH AND BOWEL: Large amount of stool throughout the colon. No evidence of bowel obstruction, colitis or diverticulitis.. APPENDIX: No findings to suggest appendicitis. ABDOMINOPELVIC CAVITY: No ascites. No pneumoperitoneum. No lymphadenopathy. VESSELS: No abdominal aortic aneurysm. PELVIS REPRODUCTIVE ORGANS: No pelvic mass or fluid. URINARY BLADDER: Unremarkable. ABDOMINAL WALL/INGUINAL REGIONS: Unremarkable. BONES: Advanced disc degenerative change in the lower thoracic and lumbar spine.     Impression: 1. No evidence of acute cardiopulmonary process. 2. Findings compatible with constipation. No evidence of colitis, diverticulitis or bowel obstruction. Workstation performed: [id number]         ** Please Note: Dragon 360  Dictation voice to text software was used in the creation of this document. **

## 2024-01-23 NOTE — ASSESSMENT & PLAN NOTE
Urinalysis shows more of RBCs than WBCs with no bacteria.  Still qualified as abnormal, the patient does not have overt findings of urinary tract infection (pyuria etc.)  Patient has chronic dermatitis and currently has stage I sacral decubitus ulceration.  Questionable presence of cellulitis as source of infection.  Started in ER with cefepime.  Added vancomycin.  See sepsis section.

## 2024-01-23 NOTE — ASSESSMENT & PLAN NOTE
Normotensive in setting of severe sepsis   Continue metoprolol for A-fib RVR   Trend blood pressures

## 2024-01-24 ENCOUNTER — APPOINTMENT (INPATIENT)
Dept: NON INVASIVE DIAGNOSTICS | Facility: HOSPITAL | Age: 74
DRG: 871 | End: 2024-01-24
Payer: COMMERCIAL

## 2024-01-24 LAB
ALBUMIN SERPL BCP-MCNC: 2.7 G/DL (ref 3.5–5)
ALP SERPL-CCNC: 38 U/L (ref 34–104)
ALT SERPL W P-5'-P-CCNC: 19 U/L (ref 7–52)
ANION GAP SERPL CALCULATED.3IONS-SCNC: 15 MMOL/L
AORTIC ROOT: 3.1 CM
APICAL FOUR CHAMBER EJECTION FRACTION: 47 %
AST SERPL W P-5'-P-CCNC: 38 U/L (ref 13–39)
BILIRUB SERPL-MCNC: 0.84 MG/DL (ref 0.2–1)
BSA FOR ECHO PROCEDURE: 2.11 M2
BUN SERPL-MCNC: 23 MG/DL (ref 5–25)
CALCIUM ALBUM COR SERPL-MCNC: 9.3 MG/DL (ref 8.3–10.1)
CALCIUM SERPL-MCNC: 8.3 MG/DL (ref 8.4–10.2)
CHLORIDE SERPL-SCNC: 106 MMOL/L (ref 96–108)
CO2 SERPL-SCNC: 21 MMOL/L (ref 21–32)
CREAT SERPL-MCNC: 1.94 MG/DL (ref 0.6–1.3)
ERYTHROCYTE [DISTWIDTH] IN BLOOD BY AUTOMATED COUNT: 14.6 % (ref 11.6–15.1)
FRACTIONAL SHORTENING: 31 (ref 28–44)
GFR SERPL CREATININE-BSD FRML MDRD: 24 ML/MIN/1.73SQ M
GLUCOSE SERPL-MCNC: 68 MG/DL (ref 65–140)
GLUCOSE SERPL-MCNC: 85 MG/DL (ref 65–140)
HCT VFR BLD AUTO: 38.3 % (ref 34.8–46.1)
HGB BLD-MCNC: 11.7 G/DL (ref 11.5–15.4)
INTERVENTRICULAR SEPTUM IN DIASTOLE (PARASTERNAL SHORT AXIS VIEW): 1.2 CM
INTERVENTRICULAR SEPTUM: 1.2 CM (ref 0.6–1.1)
LAAS-AP2: 11.9 CM2
LAAS-AP4: 10.5 CM2
LACTATE SERPL-SCNC: 2.4 MMOL/L (ref 0.5–2)
LACTATE SERPL-SCNC: 2.7 MMOL/L (ref 0.5–2)
LEFT ATRIUM SIZE: 4.4 CM
LEFT ATRIUM VOLUME (MOD BIPLANE): 32 ML
LEFT ATRIUM VOLUME INDEX (MOD BIPLANE): 15.2 ML/M2
LEFT INTERNAL DIMENSION IN SYSTOLE: 3.3 CM (ref 2.1–4)
LEFT VENTRICULAR INTERNAL DIMENSION IN DIASTOLE: 4.8 CM (ref 3.5–6)
LEFT VENTRICULAR POSTERIOR WALL IN END DIASTOLE: 1.2 CM
LEFT VENTRICULAR STROKE VOLUME: 65 ML
LVSV (TEICH): 65 ML
MAGNESIUM SERPL-MCNC: 2.3 MG/DL (ref 1.9–2.7)
MCH RBC QN AUTO: 27.6 PG (ref 26.8–34.3)
MCHC RBC AUTO-ENTMCNC: 30.5 G/DL (ref 31.4–37.4)
MCV RBC AUTO: 90 FL (ref 82–98)
MRSA NOSE QL CULT: NORMAL
MV E'TISSUE VEL-LAT: 9 CM/S
MV E'TISSUE VEL-SEP: 6 CM/S
PHOSPHATE SERPL-MCNC: 3.4 MG/DL (ref 2.3–4.1)
PLATELET # BLD AUTO: 172 THOUSANDS/UL (ref 149–390)
PMV BLD AUTO: 11.9 FL (ref 8.9–12.7)
POTASSIUM SERPL-SCNC: 4.1 MMOL/L (ref 3.5–5.3)
PROCALCITONIN SERPL-MCNC: 3.29 NG/ML
PROT SERPL-MCNC: 5.6 G/DL (ref 6.4–8.4)
RBC # BLD AUTO: 4.24 MILLION/UL (ref 3.81–5.12)
RIGHT ATRIUM AREA SYSTOLE A4C: 10 CM2
RIGHT VENTRICLE ID DIMENSION: 2.8 CM
SL CV LEFT ATRIUM LENGTH A2C: 3.1 CM
SL CV LV EF: 65
SL CV PED ECHO LEFT VENTRICLE DIASTOLIC VOLUME (MOD BIPLANE) 2D: 109 ML
SL CV PED ECHO LEFT VENTRICLE SYSTOLIC VOLUME (MOD BIPLANE) 2D: 44 ML
SODIUM SERPL-SCNC: 142 MMOL/L (ref 135–147)
TRICUSPID ANNULAR PLANE SYSTOLIC EXCURSION: 1.3 CM
VANCOMYCIN SERPL-MCNC: 14.6 UG/ML (ref 10–20)
WBC # BLD AUTO: 8.09 THOUSAND/UL (ref 4.31–10.16)

## 2024-01-24 PROCEDURE — 85027 COMPLETE CBC AUTOMATED: CPT | Performed by: PHYSICIAN ASSISTANT

## 2024-01-24 PROCEDURE — 80053 COMPREHEN METABOLIC PANEL: CPT | Performed by: PHYSICIAN ASSISTANT

## 2024-01-24 PROCEDURE — 99291 CRITICAL CARE FIRST HOUR: CPT | Performed by: STUDENT IN AN ORGANIZED HEALTH CARE EDUCATION/TRAINING PROGRAM

## 2024-01-24 PROCEDURE — C9113 INJ PANTOPRAZOLE SODIUM, VIA: HCPCS | Performed by: INTERNAL MEDICINE

## 2024-01-24 PROCEDURE — 84100 ASSAY OF PHOSPHORUS: CPT | Performed by: PHYSICIAN ASSISTANT

## 2024-01-24 PROCEDURE — 93306 TTE W/DOPPLER COMPLETE: CPT

## 2024-01-24 PROCEDURE — 84145 PROCALCITONIN (PCT): CPT | Performed by: PHYSICIAN ASSISTANT

## 2024-01-24 PROCEDURE — 82948 REAGENT STRIP/BLOOD GLUCOSE: CPT

## 2024-01-24 PROCEDURE — 99232 SBSQ HOSP IP/OBS MODERATE 35: CPT | Performed by: INTERNAL MEDICINE

## 2024-01-24 PROCEDURE — 93306 TTE W/DOPPLER COMPLETE: CPT | Performed by: INTERNAL MEDICINE

## 2024-01-24 PROCEDURE — 83735 ASSAY OF MAGNESIUM: CPT | Performed by: PHYSICIAN ASSISTANT

## 2024-01-24 PROCEDURE — 92526 ORAL FUNCTION THERAPY: CPT

## 2024-01-24 PROCEDURE — 83605 ASSAY OF LACTIC ACID: CPT | Performed by: STUDENT IN AN ORGANIZED HEALTH CARE EDUCATION/TRAINING PROGRAM

## 2024-01-24 PROCEDURE — 80202 ASSAY OF VANCOMYCIN: CPT | Performed by: PHYSICIAN ASSISTANT

## 2024-01-24 PROCEDURE — 99233 SBSQ HOSP IP/OBS HIGH 50: CPT | Performed by: INTERNAL MEDICINE

## 2024-01-24 RX ORDER — SODIUM CHLORIDE, SODIUM GLUCONATE, SODIUM ACETATE, POTASSIUM CHLORIDE, MAGNESIUM CHLORIDE, SODIUM PHOSPHATE, DIBASIC, AND POTASSIUM PHOSPHATE .53; .5; .37; .037; .03; .012; .00082 G/100ML; G/100ML; G/100ML; G/100ML; G/100ML; G/100ML; G/100ML
1000 INJECTION, SOLUTION INTRAVENOUS ONCE
Status: DISCONTINUED | OUTPATIENT
Start: 2024-01-24 | End: 2024-01-24

## 2024-01-24 RX ORDER — ALBUMIN, HUMAN INJ 5% 5 %
25 SOLUTION INTRAVENOUS ONCE AS NEEDED
Status: DISCONTINUED | OUTPATIENT
Start: 2024-01-24 | End: 2024-01-24

## 2024-01-24 RX ORDER — SODIUM CHLORIDE, SODIUM GLUCONATE, SODIUM ACETATE, POTASSIUM CHLORIDE, MAGNESIUM CHLORIDE, SODIUM PHOSPHATE, DIBASIC, AND POTASSIUM PHOSPHATE .53; .5; .37; .037; .03; .012; .00082 G/100ML; G/100ML; G/100ML; G/100ML; G/100ML; G/100ML; G/100ML
1000 INJECTION, SOLUTION INTRAVENOUS ONCE
Status: COMPLETED | OUTPATIENT
Start: 2024-01-24 | End: 2024-01-24

## 2024-01-24 RX ORDER — ALBUMIN, HUMAN INJ 5% 5 %
25 SOLUTION INTRAVENOUS ONCE
Status: COMPLETED | OUTPATIENT
Start: 2024-01-24 | End: 2024-01-24

## 2024-01-24 RX ORDER — CEFAZOLIN SODIUM 2 G/50ML
2000 SOLUTION INTRAVENOUS EVERY 8 HOURS
Status: DISCONTINUED | OUTPATIENT
Start: 2024-01-24 | End: 2024-02-10 | Stop reason: HOSPADM

## 2024-01-24 RX ORDER — CHLORHEXIDINE GLUCONATE ORAL RINSE 1.2 MG/ML
15 SOLUTION DENTAL EVERY 12 HOURS SCHEDULED
Status: DISCONTINUED | OUTPATIENT
Start: 2024-01-24 | End: 2024-02-10 | Stop reason: HOSPADM

## 2024-01-24 RX ADMIN — HYDROCORTISONE SODIUM SUCCINATE 25 MG: 100 INJECTION, POWDER, FOR SOLUTION INTRAMUSCULAR; INTRAVENOUS at 09:44

## 2024-01-24 RX ADMIN — SODIUM CHLORIDE, SODIUM GLUCONATE, SODIUM ACETATE, POTASSIUM CHLORIDE, MAGNESIUM CHLORIDE, SODIUM PHOSPHATE, DIBASIC, AND POTASSIUM PHOSPHATE 1000 ML: .53; .5; .37; .037; .03; .012; .00082 INJECTION, SOLUTION INTRAVENOUS at 17:22

## 2024-01-24 RX ADMIN — CEFAZOLIN SODIUM 2000 MG: 2 SOLUTION INTRAVENOUS at 22:04

## 2024-01-24 RX ADMIN — Medication: at 21:57

## 2024-01-24 RX ADMIN — PANTOPRAZOLE SODIUM 40 MG: 40 INJECTION, POWDER, FOR SOLUTION INTRAVENOUS at 09:44

## 2024-01-24 RX ADMIN — CHLORHEXIDINE GLUCONATE 15 ML: 1.2 SOLUTION ORAL at 22:04

## 2024-01-24 RX ADMIN — APIXABAN 5 MG: 5 TABLET, FILM COATED ORAL at 18:01

## 2024-01-24 RX ADMIN — ACETAMINOPHEN 650 MG: 650 SUPPOSITORY RECTAL at 04:09

## 2024-01-24 RX ADMIN — CEFEPIME 2000 MG: 2 INJECTION, POWDER, FOR SOLUTION INTRAVENOUS at 02:51

## 2024-01-24 RX ADMIN — CEFAZOLIN SODIUM 2000 MG: 2 SOLUTION INTRAVENOUS at 14:16

## 2024-01-24 RX ADMIN — SODIUM CHLORIDE, SODIUM GLUCONATE, SODIUM ACETATE, POTASSIUM CHLORIDE, MAGNESIUM CHLORIDE, SODIUM PHOSPHATE, DIBASIC, AND POTASSIUM PHOSPHATE 1000 ML: .53; .5; .37; .037; .03; .012; .00082 INJECTION, SOLUTION INTRAVENOUS at 07:45

## 2024-01-24 RX ADMIN — HYDROCORTISONE SODIUM SUCCINATE 50 MG: 100 INJECTION, POWDER, FOR SOLUTION INTRAMUSCULAR; INTRAVENOUS at 18:01

## 2024-01-24 RX ADMIN — ALBUMIN (HUMAN) 25 G: 12.5 INJECTION, SOLUTION INTRAVENOUS at 09:10

## 2024-01-24 RX ADMIN — ALBUMIN (HUMAN) 25 G: 12.5 INJECTION, SOLUTION INTRAVENOUS at 11:28

## 2024-01-24 RX ADMIN — ALBUMIN (HUMAN) 25 G: 12.5 INJECTION, SOLUTION INTRAVENOUS at 15:26

## 2024-01-24 RX ADMIN — Medication: at 09:12

## 2024-01-24 RX ADMIN — HYDROCORTISONE SODIUM SUCCINATE 25 MG: 100 INJECTION, POWDER, FOR SOLUTION INTRAMUSCULAR; INTRAVENOUS at 14:15

## 2024-01-24 NOTE — SPEECH THERAPY NOTE
Attempted f/u this pm with patient. She continues to be lethargic and hypotensive. RN requests hold for now. Will f/u for dysphagia therapy as able.

## 2024-01-24 NOTE — ASSESSMENT & PLAN NOTE
Septic shock secondary to staph bacteremia, cellulitis.  Transiently required vasopressor support in the ICU and stress dose steroids    Continue on IV cefazolin per ID recommendations  Monitor WBC count and fever curve  Monitor wounds clinically with local wound care  Follow-up repeat blood cultures  Resume home prednisone dose 5 mg twice daily

## 2024-01-24 NOTE — CONSULTS
The patient's vancomycin therapy has been completed/discontinued. Thank you for this consult. Pharmacy will sign off now.   Fiona Herrera, Pharmacist

## 2024-01-24 NOTE — SEPSIS NOTE
Sepsis Note   Bhavna Al 73 y.o. female MRN: [id number]  Unit/Bed#: [facility] 424-01 Encounter: [id number]       Initial Sepsis Screening       Row Name 01/24/24 0759 01/23/24 0235             Is the patient's history suggestive of a new or worsening infection? -- Yes (Proceed)  -JS       Suspected source of infection soft tissue  suspected cellulitis, 1/2 BC gram pos  -AF suspect infection, source unknown  -JS       Indicate SIRS criteria Tachycardia > 90 bpm;Hyperthemia > 38.3C (100.9F) OR Hypothermia <36C (96.8F)  -AF Hyperthemia > 38.3C (100.9F) OR Hypothermia <36C (96.8F);Tachycardia > 90 bpm  -JS       Are two or more of the above signs & symptoms of infection both present and new to the patient? Yes (Proceed)  -AF Yes (Proceed)  -JS       Assess for evidence of organ dysfunction: Are any of the below criteria present within 6 hours of suspected infection and SIRS criteria that are NOT considered to be chronic conditions? SBP < 90  -AF Lactate > 2.0  -JS       Date of presentation of severe sepsis 01/23/24  -AF 01/23/24  -JS       Time of presentation of severe sepsis 0102  -AF 0102  -JS       Date of presentation of septic shock 01/24/24  -AF --       Time of presentation of septic shock 0740  -AF --       Fluid Resuscitation: A lesser volume than 30 ml/kg IV fluid will be given  -AF --       The 30 mL/kg fluid bolus was not given to the patient despite having hypotension, a lactate of >= 4 mmol/L, or documentation of septic shock secondary to: Heart Failure;Concern for fluid/volume overload  -AF --       Instead of the 30 ml/kg fluid bolus, the following volume of crystalloid fluid will be ordered: 2L  Will give 2L instead of 3.3 L due to CHF. Already received 1L in ED plus continuous IVF.  -AF --       Of the following fluid type: NSS  -AF --       Is the patient is persistently hypotensive in the hour after fluid bolus administration? If yes, patient meets criteria for vasopressor use. --  TBD  -AF --        "Sepsis Note: Click \"NEXT\" below (NOT \"close\") to generate sepsis note based on above information. YES (proceed by clicking \"NEXT\")  -AF YES (proceed by clicking \"NEXT\")  -JS                 User Key  (r) = Recorded By, (t) = Taken By, (c) = Cosigned By      Initials Name Provider Type    AF Tiara Valdivia PA-C Physician Assistant    YANDEL Silva MD Physician                        Body mass index is 43.47 kg/m².  Wt Readings from Last 1 Encounters:   01/24/24 111 kg (245 lb 6 oz)        Ideal body weight: 52.4 kg (115 lb 8.3 oz)  Adjusted ideal body weight: 76 kg (167 lb 7.4 oz)    *Addendum 0808, BP while first bolus infusing 89/60. Pt fluid responsive. If BP drops below SBP<90 or MAP <65 will give additional 1L of albumin rather than NSS given low UOP.    "

## 2024-01-24 NOTE — QUICK NOTE
Reviewed case with critical care.  Patient with continued labile blood pressure.  She does appear responsive to albumin.  Repeat dosing of albumin ordered -discussed with nursing.  If patient has persistently low blood pressure may need transfer to ICU for pressor agents.  Critical care following.  Fortunately blood pressures did improve with albumin.  However, given labile status we will continue to monitor closely for potential escalation of care if needed  Critical care to reevaluate    4:23pm  Discussed with critical care and follow-up for above.  Will transfer to critical care service due to low blood pressure.

## 2024-01-24 NOTE — UTILIZATION REVIEW
Initial Clinical Review    The patient was transferred to [facility] ([place]) on 1/23/24 from outside skilled rehab facility, where care began on 6/1/23. Multiple MNs have already been surpassed with active ongoing care.      Admission: Date/Time/Statement:   Admission Orders (From admission, onward)       Ordered        01/23/24 0248  Inpatient Admission  Once                          Orders Placed This Encounter   Procedures    Inpatient Admission     Standing Status:   Standing     Number of Occurrences:   1     Order Specific Question:   Level of Care     Answer:   Med Surg [16]     Order Specific Question:   Estimated length of stay     Answer:   More than 2 Midnights     Order Specific Question:   Certification     Answer:   I certify that inpatient services are medically necessary for this patient for a duration of greater than two midnights. See H&P and MD Progress Notes for additional information about the patient's course of treatment.     ED Arrival Information       Expected   1/22/2024     Arrival   1/22/2024 22:58    Acuity   Emergent              Means of arrival   Ambulance    Escorted by   [facility] ([place])    Service   Hospitalist    Admission type   Emergency              Arrival complaint   Alter mental status             Chief Complaint   Patient presents with    Altered Mental Status     Pt coming from rehab after staff reported altered metal status       Initial Presentation: 73 y.o. female with PMHx: morbid obesity with dermatitis currently in nursing facility for continued rehab, history of GERD, benign essential hypertension, bronchitis, chronic diastolic heart failure, dermatitis, hyperuricemia, lumbar radiculopathy, rheumatoid arthritis, who presented to [facility] ED via EMS due to being sent by nursing facility due to fever with altered mental status as of 5 PM yesterday. According to daughter and granddaughter, patient normally is very active and  talking coherently.  However as of the fever last night she is more lethargic and although currently will sometimes open her eyes, she does not indulge in any conversation.  Labs revealed presence of RBCs and some WBCs but no bacteria in urine.  Her procalcitonin is normal but her lactic acid is elevated at 2.3.  Started on cefepime and given IVF bolus.  Plan:  Admit Inpatient status to med surg dt Sepsis: monitor mental status, fu on blood cxs, ID consult, continue IV cefepime and add IV Vanco, trend LA and procal. Hold home lasix dt dehydration and septic condition. Continue cautious IVF and monitor for fluid overload. Metoprolol IV x1 given dt HTN. CM consult for dispo planning.     1/23 Per ID: Continue vancomycin and cefepime for now. Pharmacy follow-up for vancomycin dose management. Follow-up blood cultures. Recheck CBC with differential and CMP to make sure no developing antibiotic associated toxicities or worsening infection. Recheck procalcitonin level to look for other evidence of an acute bacterial process.    ED Triage Vitals   Temperature Pulse Respirations Blood Pressure SpO2   01/22/24 2314 01/23/24 0200 01/23/24 0200 01/23/24 0200 01/23/24 0200   (!) 101.4 °F (38.6 °C) (!) 132 (!) 28 128/58 98 %      Temp Source Heart Rate Source Patient Position - Orthostatic VS BP Location FiO2 (%)   01/22/24 2314 01/23/24 0645 01/23/24 0645 01/23/24 0645 --   Rectal Monitor Lying Right arm       Pain Score       01/23/24 0620       Med Not Given for Pain - for MAR use only          Wt Readings from Last 1 Encounters:   01/24/24 111 kg (245 lb 6 oz)     Additional Vital Signs:   Date/Time Temp Pulse Resp BP MAP (mmHg) SpO2 O2 Device Patient Position - Orthostatic VS   01/24/24 1000 -- 123 Abnormal  16 95/66 84 99 % -- --   01/24/24 0909 -- 113 Abnormal  -- 83/47 Abnormal  -- -- -- --   01/24/24 0837 -- -- -- 100/56 73 99 % -- Lying   01/24/24 07:26:46 98.6 °F (37 °C) 133 Abnormal  16 70/36 Abnormal  47 Abnormal   99 % -- --   01/24/24 03:17:48 100.7 °F (38.2 °C) Abnormal  111 Abnormal  25 Abnormal  118/68 85 100 % None (Room air) --   01/23/24 22:25:10 98.7 °F (37.1 °C) 129 Abnormal  -- 119/69 86 100 % -- --   01/23/24 21:17:32 -- 127 Abnormal  -- 121/67 85 97 % -- --   01/23/24 2100 -- -- -- -- -- -- None (Room air) --   01/23/24 2000 -- 126 Abnormal  28 Abnormal  104/54 72 94 % None (Room air) Lying   01/23/24 1900 -- 126 Abnormal  28 Abnormal  105/59 77 93 % None (Room air) Lying   01/23/24 1800 -- 124 Abnormal  28 Abnormal  108/77 88 93 % None (Room air) Lying   01/23/24 1700 -- 122 Abnormal  20 113/83 94 94 % None (Room air) Lying   01/23/24 1600 -- 126 Abnormal  22 114/85 95 99 % None (Room air) Lying   01/23/24 1500 -- 118 Abnormal  20 123/70 83 98 % None (Room air) Lying   01/23/24 1400 -- 118 Abnormal  20 109/64 77 97 % None (Room air) Lying   01/23/24 1300 -- 116 Abnormal  29 Abnormal  101/57 74 98 % None (Room air) Lying   01/23/24 1231 -- 108 Abnormal  24 Abnormal  112/52 -- 97 % -- Lying   01/23/24 1215 -- 118 Abnormal  28 Abnormal  112/52 75 97 % -- --   01/23/24 1200 -- 118 Abnormal  20 118/56 74 96 % None (Room air) Lying   01/23/24 1100 -- 116 Abnormal  20 128/60 87 98 % None (Room air) Lying   01/23/24 1000 -- 116 Abnormal  20 135/53 77 100 % None (Room air) Lying   01/23/24 0900 -- 118 Abnormal  28 Abnormal  119/58 83 91 % None (Room air) Lying   01/23/24 0700 -- 106 Abnormal  30 Abnormal  123/60 86 100 % None (Room air) Lying   01/23/24 0645 -- 104 29 Abnormal  119/56 81 99 % None (Room air) Lying   01/23/24 0630 -- 130 Abnormal  32 Abnormal  147/55 76 -- -- --   01/23/24 0515 -- 116 Abnormal  31 Abnormal  146/62 89 99 % -- --   01/23/24 0500 -- 116 Abnormal  32 Abnormal  138/80 99 98 % -- --   01/23/24 0445 -- 118 Abnormal  34 Abnormal  137/64 92 97 % -- --   01/23/24 0415 101.3 °F (38.5 °C) Abnormal  116 Abnormal  34 Abnormal  137/71 94 95 % -- --   01/23/24 0400 -- 116 Abnormal  33 Abnormal  131/61 88  96 % -- --   01/23/24 0345 -- 114 Abnormal  31 Abnormal  133/61 88 95 % -- --   01/23/24 0330 -- 110 Abnormal  33 Abnormal  139/58 84 95 % -- --   01/23/24 0315 -- 134 Abnormal  33 Abnormal  151/79 98 98 % -- --   01/23/24 0245 -- 142 Abnormal    36 Abnormal  132/69 94 98 % -- --   Pulse: resident aware at 01/23/24 0245   01/23/24 0230 -- 136 Abnormal  34 Abnormal  146/67 97 98 % None (Room air) --   01/23/24 0200 -- 132 Abnormal  28 Abnormal  128/58 84 98 % -- --   01/22/24 2314 101.4 °F (38.6 °C) Abnormal  -- -- -- -- -- -- --     Pertinent Labs/Diagnostic Test Results:   1/24 ECHO:    1/23 EKG: Atrial fibrillation with rapid ventricular response with premature ventricular or aberrantly conducted complexes  Poor R wave progression  Abnormal ECG    CT chest abdomen pelvis wo contrast   Final Result by Donald Stark MD (01/23 0155)         1. No evidence of acute cardiopulmonary process.   2. Findings compatible with constipation. No evidence of colitis, diverticulitis or bowel obstruction.               Workstation performed: [id number]           Results from last 7 days   Lab Units 01/23/24  0013   SARS-COV-2  Negative     Results from last 7 days   Lab Units 01/24/24  0250 01/23/24  0549 01/23/24  0013   WBC Thousand/uL 8.09 8.62 6.99   HEMOGLOBIN g/dL 11.7 12.6 14.2   HEMATOCRIT % 38.3 41.5 46.6*   PLATELETS Thousands/uL 172 204 212   NEUTROS ABS Thousands/µL  --  6.84 5.06         Results from last 7 days   Lab Units 01/24/24  0250 01/23/24  0549 01/23/24  0013   SODIUM mmol/L 142 143 140   POTASSIUM mmol/L 4.1 4.2 4.7   CHLORIDE mmol/L 106 104 101   CO2 mmol/L 21 26 28   ANION GAP mmol/L 15 13 11   BUN mg/dL 23 19 19   CREATININE mg/dL 1.94* 1.75* 1.77*   EGFR ml/min/1.73sq m 24 28 27   CALCIUM mg/dL 8.3* 8.4 9.6   MAGNESIUM mg/dL 2.3 1.6*  --    PHOSPHORUS mg/dL 3.4 2.5  --      Results from last 7 days   Lab Units 01/24/24  0250 01/23/24  0549 01/23/24  0013   AST U/L 38 33 30   ALT U/L 19 21 24   ALK  "PHOS U/L 38 45 53   TOTAL PROTEIN g/dL 5.6* 6.3* 7.6   ALBUMIN g/dL 2.7* 2.9* 3.4*   TOTAL BILIRUBIN mg/dL 0.84 0.93 0.94         Results from last 7 days   Lab Units 01/24/24  0250 01/23/24  0549 01/23/24  0013   GLUCOSE RANDOM mg/dL 68 67 76             No results found for: \"BETA-HYDROXYBUTYRATE\"                           Results from last 7 days   Lab Units 01/23/24  0013   PROTIME seconds 17.5*   INR  1.46*   PTT seconds 29         Results from last 7 days   Lab Units 01/24/24  0250 01/23/24  0013   PROCALCITONIN ng/ml 3.29* 0.17     Results from last 7 days   Lab Units 01/24/24  0828 01/23/24  0232 01/23/24  0013   LACTIC ACID mmol/L 2.7* 2.3* 2.3*                                                 Results from last 7 days   Lab Units 01/23/24  0433 01/23/24  0145   CLARITY UA  Clear Clear   COLOR UA  Light Yellow Light Yellow   SPEC GRAV UA  1.013 1.013   PH UA  5.5 5.5   GLUCOSE UA mg/dl Negative Negative   KETONES UA mg/dl Negative Negative   BLOOD UA  Small* Trace*   PROTEIN UA mg/dl Trace* Negative   NITRITE UA  Negative Negative   BILIRUBIN UA  Negative Negative   UROBILINOGEN UA (BE) mg/dl <2.0 <2.0   LEUKOCYTES UA  Trace* Trace*   WBC UA /hpf 1-2 2-4*   RBC UA /hpf 10-20* 10-20*   BACTERIA UA /hpf None Seen None Seen   EPITHELIAL CELLS WET PREP /hpf Occasional Occasional     Results from last 7 days   Lab Units 01/23/24  0013   INFLUENZA A PCR  Negative   INFLUENZA B PCR  Negative   RSV PCR  Negative                             Results from last 7 days   Lab Units 01/23/24  0013   BLOOD CULTURE  Staphylococcus aureus*  No Growth at 24 hrs.   GRAM STAIN RESULT  Gram positive cocci in clusters*             Results from last 7 days   Lab Units 01/24/24  0250   VANCOMYCIN RM ug/mL 14.6       ED Treatment:   Medication Administration from 01/22/2024 2258 to 01/23/2024 2056         Date/Time Order Dose Route Action     01/23/2024 0130 EST cefepime (MAXIPIME) 2 g/50 mL dextrose IVPB 2,000 mg Intravenous New Bag "     01/23/2024 0230 EST sodium chloride 0.9 % bolus 500 mL 500 mL Intravenous New Bag     01/23/2024 1126 EST white petrolatum-mineral oil (EUCERIN,HYDROCERIN) cream -- Topical Given     01/23/2024 0407 EST multi-electrolyte (ISOLYTE-S PH 7.4) bolus 500 mL 500 mL Intravenous New Bag     01/23/2024 1733 EST ondansetron (ZOFRAN) injection 4 mg 4 mg Intravenous Given     01/23/2024 1647 EST cefepime (MAXIPIME) 2 g/50 mL dextrose IVPB 2,000 mg Intravenous New Bag     01/23/2024 1255 EST cefepime (MAXIPIME) 2 g/50 mL dextrose IVPB 2,000 mg Intravenous New Bag     01/23/2024 0311 EST metoprolol (LOPRESSOR) injection 5 mg 5 mg Intravenous Given     01/23/2024 0408 EST vancomycin (VANCOCIN) 2,000 mg in sodium chloride 0.9 % 500 mL IVPB 2,000 mg Intravenous New Bag     01/23/2024 0621 EST pantoprazole (PROTONIX) injection 40 mg 40 mg Intravenous Given     01/23/2024 0614 EST multi-electrolyte (ISOLYTE-S PH 7.4) bolus 500 mL 500 mL Intravenous New Bag     01/23/2024 0620 EST acetaminophen (TYLENOL) rectal suppository 650 mg 650 mg Rectal Given     01/23/2024 0629 EST metoprolol (LOPRESSOR) injection 5 mg 5 mg Intravenous Given     01/23/2024 1646 EST multi-electrolyte (PLASMALYTE-A/ISOLYTE-S PH 7.4) IV solution 75 mL/hr Intravenous New Bag     01/23/2024 1122 EST multi-electrolyte (PLASMALYTE-A/ISOLYTE-S PH 7.4) IV solution 75 mL/hr Intravenous New Bag          Past Medical History:   Diagnosis Date    Abnormal thyroid function test     last assessed: Sept 25, 2015     Arthritis     Caries     last assessed: Sept 9, 2016     Continuous opioid dependence (HCC) 12/2/2021    Edema of right lower extremity     last assessed: March 18, 2015     GERD (gastroesophageal reflux disease)     Hypertension     Medicare annual wellness visit, subsequent 12/2/2021    Positive blood culture 3/11/2022    Sarcoid      Present on Admission:   Ambulatory dysfunction   Abnormal urinalysis   Severe sepsis (HCC)   Benign essential hypertension    Chronic kidney disease   Chronic diastolic heart failure (HCC)   Dermatitis associated with incontinence   Paroxysmal atrial fibrillation (HCC)   Rheumatoid arthritis (HCC)   Vitamin D deficiency   Cellulitis      Admitting Diagnosis: Cellulitis [L03.90]  Altered mental status [R41.82]  Atrial fibrillation with RVR (HCC) [I48.91]  Sepsis (HCC) [A41.9]  Age/Sex: 73 y.o. female  Admission Orders:  Scheduled Medications:  apixaban, 5 mg, Oral, BID  cefazolin, 2,000 mg, Intravenous, Q8H  cholecalciferol, 1,000 Units, Oral, Daily  hydrocortisone sodium succinate, 25 mg, Intravenous, Q8H FAMILIA  metoprolol tartrate, 25 mg, Oral, Q12H AFMILIA  pantoprazole, 40 mg, Intravenous, Q24H FAMILIA  white petrolatum-mineral oil, , Topical, TID      Continuous IV Infusions:  multi-electrolyte, 50 mL/hr, Intravenous, Continuous      PRN Meds:  acetaminophen, 650 mg, Rectal, Q4H PRN  Albumin 5%, 25 g, Intravenous, Once PRN  albuterol, 2 puff, Inhalation, Q6H PRN  aluminum-magnesium hydroxide-simethicone, 30 mL, Oral, Q6H PRN  docusate sodium, 100 mg, Oral, BID PRN  metoprolol, 5 mg, Intravenous, Q6H PRN  ondansetron, 4 mg, Intravenous, Q6H PRN    scd    IP CONSULT TO CASE MANAGEMENT  IP CONSULT TO PHARMACY  IP CONSULT TO INFECTIOUS DISEASES  IP CONSULT TO PHARMACY  IP CONSULT TO PICC TEAM    Network Utilization Review Department  ATTENTION: Please call with any questions or concerns to [phone] and carefully listen to the prompts so that you are directed to the right person. All voicemails are confidential.   For Discharge needs, contact Care Management DC Support Team at [phone] opt. [phone]  Send all requests for admission clinical reviews, approved or denied determinations and any other requests to dedicated fax number below belonging to the campus where the patient is receiving treatment. List of dedicated fax numbers for the Facilities:  FACILITY NAME UR FAX NUMBER   ADMISSION DENIALS (Administrative/Medical Necessity) [phone]    DISCHARGE SUPPORT TEAM (NETWORK) [phone]   PARENT CHILD HEALTH (Maternity/NICU/Pediatrics) [phone]   [facility] [phone]   [facility] [phone]   [facility] [phone]   [facility] [phone]   [facility] [phone]   [facility] [phone]   [facility] [phone]   [facility] [phone]   [facility] [phone]   [facility] [phone]   General acute hospital [phone]

## 2024-01-24 NOTE — DISCHARGE INSTR - OTHER ORDERS
Skin Care Plan:  1-Cleanse sacro-buttocks with soap and water. Apply TRIAD paste to open areas on B/L Sacro-Buttocks TID and PRN episodes of incontinence. Apply hydraguard to intact skin on sacro-buttocks.   2-Turn/reposition q2h for pressure re-distribution on skin .  3-Elevate heels to offload pressure.  4-Moisturize skin daily with skin nourishing cream  5-Ehob cushion in chair when out of bed.  6-Preventative Hydraguard to bilateral heels, elbows, hips BID and PRN.   7- Right lateral ankle - Cleanse with NSS and pat dry. Apply silicone bordered foam dressings to areas. Moo with T for Treatment and change every other day or PRN soilage/displacement.   8-P-500 Low air loss mattress ordered for patient.

## 2024-01-24 NOTE — PROGRESS NOTES
Progress Note - Infectious Disease   Bhavna Al 73 y.o. female MRN: [id number]  Unit/Bed#: [facility] 424-01 Encounter: [id number]      Impression/Plan:  1.  Sepsis.  Appears to be secondary to Staphylococcus aureus and Staphylococcus lugdunensis bacteremia.  The patient does have significant rash with areas of potential cutaneous breakdown that could lead to a transient bacteremia.  The patient remains hemodynamically stable despite her systemic illness.  Procalcitonin level has been quite high.  -Antibiotics as below  -Follow-up blood cultures  -Recheck CBC with differential and CMP to make sure no developing antibiotic associated toxicities or worsening infection  -Additional workup as below    2.  MSSA and Staphylococcus lugdunensis bacteremia.  Suspected cutaneous translocation in the setting of advanced psoriasis.  Consideration for the possibility of endovascular infection.  -Discontinue vancomycin and cefepime  -Begin cefazolin 2 g IV every 8 hours  -Recheck blood cultures x 2 sets to confirm clearance of the bacteremia  -Check transthoracic echocardiogram     3.  Acute encephalopathy.  In the setting of sepsis from bacteremia.  No headache or stiff neck to suggest a primary CNS infection.  Suspect multifactorial in this patient with baseline cognitive issues.  Clinically improved.  -Monitor cognition  -Antibiotics as above for now  -Additional workup as needed     4.  Psoriasis.  Without any recent directed therapy.  Could be the nidus for translocation of bacteria into the bloodstream.  -Treatment as per the primary  -Consult dermatology     5.  Atrial fibrillation.  With a rapid ventricular response.  On rate control and anticoagulation     6.  Acute kidney injury.  Complicating chronic kidney disease.  Stage III.  Suspect prerenal issues playing a significant role.  Consideration for the possibility of sepsis playing a role.  -Recheck BMP  -Dose adjusted antibiotics  -Volume management     7.  Rheumatoid  arthritis.  On low-dose prednisone and hydroxychloroquine  -Low threshold for stress dose steroids    Discussed with the primary service the plan to change the antibiotics to cefazolin, recheck blood cultures, and check an echocardiogram and they agree with the plan.    Antibiotics:  Vancomycin  Cefepime    Subjective:  Patient with decreased temperature curve; no nausea, vomiting, diarrhea; no cough, shortness of breath; no pain. No new symptoms.  She is more alert and interactive.  She remains hemodynamically stable.    Objective:  Vitals:  Temp:  [98.6 °F (37 °C)-100.7 °F (38.2 °C)] 98.6 °F (37 °C)  HR:  [108-133] 113  Resp:  [16-29] 16  BP: ()/(36-85) 83/47  SpO2:  [93 %-100 %] 99 %  Temp (24hrs), Av.3 °F (37.4 °C), Min:98.6 °F (37 °C), Max:100.7 °F (38.2 °C)  Current: Temperature: 98.6 °F (37 °C)    Physical Exam:   General Appearance:  Alert, interactive, nontoxic, no acute distress.   Throat: Oropharynx moist without lesions.    Lungs:   Clear to auscultation bilaterally; no wheezes, rhonchi or rales; respirations unlabored   Heart:  Tachycardic; no murmur, rub or gallop   Abdomen:   Soft, non-tender, non-distended, positive bowel sounds.     Extremities: No clubbing, cyanosis or edema   Skin: Diffuse rash involving the torso and extremities       Labs, Imaging, & Other studies:   All pertinent labs and imaging studies were personally reviewed  Results from last 7 days   Lab Units 24  0250 24  0549 24  0013   WBC Thousand/uL 8.09 8.62 6.99   HEMOGLOBIN g/dL 11.7 12.6 14.2   PLATELETS Thousands/uL 172 204 212     Results from last 7 days   Lab Units 24  0250 24  0549 24  0013   SODIUM mmol/L 142 143 140   POTASSIUM mmol/L 4.1 4.2 4.7   CHLORIDE mmol/L 106 104 101   CO2 mmol/L 21 26 28   BUN mg/dL 23 19 19   CREATININE mg/dL 1.94* 1.75* 1.77*   EGFR ml/min/1.73sq m 24 28 27   CALCIUM mg/dL 8.3* 8.4 9.6   AST U/L 38 33 30   ALT U/L 19 21 24   ALK PHOS U/L 38 45 53      Results from last 7 days   Lab Units 01/23/24  0013   BLOOD CULTURE  No Growth at 24 hrs.   GRAM STAIN RESULT  Gram positive cocci in clusters*     Results from last 7 days   Lab Units 01/24/24  0250 01/23/24  0013   PROCALCITONIN ng/ml 3.29* 0.17

## 2024-01-24 NOTE — CONSULTS
[facility]  Consult: Critical Care  Name: Bhavna Al 73 y.o. female I MRN: [id number]  [facility] Date of Admission: 1/22/2024   Date of Service: 1/24/2024 I Hospital Day: 1      Consults: septic shock     Assessment/Plan   Neuro:   Diagnosis: Encephalopathic   In the setting of hypotension and septic shock  Plan:   Patient is able to follow commands however does not verbally respond   NPO for now given mental status   CAM ICU   Delirium precautions     CV:   Diagnosis: Septic Shock  Source - suspected skin source in the setting of diffuse plaque psorasis and dermatitis   1/23 Blood Cx: MSSA and Staph Lugdenesis   No drainable abscess   Plan:   ID following - on cefazolin 2g Q8H   Repeat blood cultures until clear   TTE ordered   Stress dose steriods - hydrocortisone 50 Q6H   Elliott gtt goal MAP >65   Diagnosis: Afib w RVR  Home meds: lopressor 25 BID, eliquis 5 BID    Plan:   Holding home lopressor 2/2 hypotension/shock   Insert NGT for eliquis administration   If not able to achieve enteral access continue AC w heparin gtt until patient able to tolerate po eliquis  Consider amio if HR >120s   Diagnosis: HFpEF  Hx of HFpEF, last TTE 2020 EF wnl, G2DD  On lasix 40 qd PTA   Plan:   Repeat TTE ordered   Monitor fluid status   Holding diuresis in the setting of shock     Pulm:  No acute issues    GI:   Diagnosis: GERD  Plan:   Continue IV PPI until able to take po    Diagnosis: Constipation   Plan:   Bowel regimen in place     :   Diagnosis: AMANDA on CKD3  Baseline Cr 1.5-1.6  Plan:   Elevated Cr on admission, peaking today at 1.94  Suspect pre-renal etiology given hypotension   Monitor BMP   Avoid relative hypotension   Avoid nephrotoxic agents     F/E/N:  Fluids: None  Electrolytes: Replete PRN  Nutrition: NPO - speech following     Heme/Onc:  No active issues   On Eliquis for Afib      Endo:   No active issues     ID:   Diagnosis: Septic shock, gram positive  bacteremia, cellulitis superimposed on dermatitis   Plan:   A/P Above   Corey placed to avoid further skin infection as from incontinence  Wound care consult      MSK/Skin:   Diagnosis: Rheumatoid Arthritis   Non ambulatory at baseline   On plaquenil 200 BID   Plan:   Holding plaquenil in the setting of septic shock   Patient does have evidence of possible acute flare w swelling in her RUE   Diagnosis: Pustular Psoriasis   Biopsy-proven pustular psoriasis   On chronic prednisone 5 BID  Plan:   Continue home meds - topical hydrocortisone, clobetasol, eucerin  dose steroids - hydrocortisone 50 Q6H     Disposition: Stepdown Level 1     History of Present Illness     HPI: Bhavna Al is a 73 y.o. F w RA on plaquenil and prednisone 5 BID, morbid obesity, HTN, HFpEF, Afib on Eliquis, CKD, dermatitis admitted to [facility] for Sepsis w source felt to be cellulitis. Inititially started on cefepime and vanc however blood cultures grew MSSA and Staphylococcus lugdunensis and patient switched to cefazolin. ID following. Additionally patient had runs of afib RVR for which she was given lopressor 5 IV x2. Rates are in the 110s. On 1/24 patient was hypotensive. Not response to 1L isolyte bolus or 1L albumin 5%. She was given stress dose steriods without improvement in MAPS. Grand daughter at bedside stated patient her mentation is different than baseline. She is usually conversational however has not been since about 10 am this morning. Admitted to [facility] for septic shock, placed on nadiya     History obtained from granddaughter, chart review, and the patient.  Review of Systems   Unable to perform ROS: Patient nonverbal   Cardiovascular:  Positive for leg swelling.   Gastrointestinal:  Positive for abdominal pain.     Historical Information   Past Medical History:  No date: Abnormal thyroid function test      Comment:  last assessed: Sept 25, 2015   No date: Arthritis  No date: Caries      Comment:  last assessed: Sept 9, 2016 12/2/2021:  Continuous opioid dependence (HCC)  No date: Edema of right lower extremity      Comment:  last assessed: 2015   No date: GERD (gastroesophageal reflux disease)  No date: Hypertension  2021: Medicare annual wellness visit, subsequent  3/11/2022: Positive blood culture  No date: Sarcoid Past Surgical History:  No date:  SECTION  2016: MULTIPLE TOOTH EXTRACTIONS; N/A      Comment:  Procedure: Surgical extraction of teeth 2, 18, 19, 30,                31; incision and drainage of left subperiosteal abscess ;               Surgeon: Clara Cevallos DMD;  Location: [facility] MAIN OR;                Service:    Current Outpatient Medications   Medication Instructions    acetaminophen (TYLENOL) 650 mg, Oral, Every 4 hours PRN    albuterol (Ventolin HFA) 90 mcg/act inhaler 2 puffs, Inhalation, Every 6 hours PRN    alendronate (FOSAMAX) 70 mg tablet Oral, Every 7 days    apixaban (ELIQUIS) 5 mg, Oral, 2 times daily    cholecalciferol (VITAMIN D3) 1,000 Units, Oral, Daily    clobetasol (TEMOVATE) 0.05 % cream Topical, 2 times daily    Clobetasol Propionate E 0.05 % emollient cream APPLY TWICE A DAY FOR PALM SKIN DERMITITS    furosemide (LASIX) 40 mg, Oral, Daily    gabapentin (Neurontin) 100 mg capsule Take 1 capsule in AM and 3 capsules in PM    hydrocortisone 2.5 % ointment Topical, 4 times daily PRN    hydroxychloroquine (PLAQUENIL) 200 mg, Oral, 2 times daily    ketoconazole (NIZORAL) 2 % cream Topical, 2 times daily    lidocaine (LIDODERM) 5 % 2 patches, Topical, Daily, Remove & Discard patch within 12 hours or as directed by MD    metoprolol tartrate (LOPRESSOR) 25 mg, Oral, Every 12 hours scheduled    nystatin (MYCOSTATIN) powder Topical, 2 times daily    omeprazole (PRILOSEC) 20 mg, Oral, Daily    polyethylene glycol (MIRALAX) 17 g, Oral, Daily    potassium chloride (K-DUR,KLOR-CON) 10 mEq tablet 10 mEq, Oral, Daily    predniSONE 5 mg, Oral, 2 times daily with meals    senna-docusate sodium  (SENOKOT S) 8.6-50 mg per tablet 1 tablet, Oral, Daily    triamcinolone (KENALOG) 0.1 % cream 1 application., Topical, 2 times daily, To affected area    white petrolatum-mineral oil (EUCERIN,HYDROCERIN) cream Topical, 3 times daily    Allergies   Allergen Reactions    Shellfish-Derived Products - Food Allergy Itching    Methotrexate Derivatives     Amoxicillin Rash    Ampicillin-Sulbactam Sodium Rash      Social History     Tobacco Use    Smoking status: Never    Smokeless tobacco: Never   Vaping Use    Vaping status: Never Used   Substance Use Topics    Alcohol use: Not Currently    Drug use: No    Family History   Problem Relation Age of Onset    Asthma Mother     Stroke Mother     No Known Problems Father     No Known Problems Sister     No Known Problems Brother     No Known Problems Maternal Grandmother     No Known Problems Maternal Grandfather     No Known Problems Paternal Grandmother     No Known Problems Paternal Grandfather     Diabetes Maternal Aunt     Breast cancer Cousin     Diabetes Cousin     Breast cancer Other           Objective                            Vitals I/O      Most Recent Min/Max in 24hrs   Temp 98.3 °F (36.8 °C) Temp  Min: 97.6 °F (36.4 °C)  Max: 100.7 °F (38.2 °C)   Pulse 103 Pulse  Min: 103  Max: 133   Resp 18 Resp  Min: 16  Max: 28   BP (!) 82/48 BP  Min: 70/36  Max: 121/67   O2 Sat 99 % SpO2  Min: 93 %  Max: 100 %      Intake/Output Summary (Last 24 hours) at 1/24/2024 1541  Last data filed at 1/24/2024 0947  Gross per 24 hour   Intake 1500 ml   Output 825 ml   Net 675 ml       Diet NPO    Invasive Monitoring           Physical Exam   Physical Exam  Eyes:      Conjunctiva/sclera: Conjunctivae normal.      Pupils: Pupils are equal, round, and reactive to light.   Skin:     General: Skin is warm.      Findings: Rash present.      Comments: Dermatitis b/l upper and lower extremities   HENT:      Head: Normocephalic and atraumatic.      Mouth/Throat:      Mouth: Mucous membranes are  dry.   Cardiovascular:      Rate and Rhythm: Tachycardia present. Rhythm irregular.      Pulses: Normal pulses.   Musculoskeletal:      Right lower leg: Edema present.      Left lower leg: Edema present.   Abdominal: General: Bowel sounds are normal.      Palpations: Abdomen is soft.      Tenderness: There is abdominal tenderness.   Constitutional:       Appearance: She is well-developed. She is obese.   Pulmonary:      Effort: Pulmonary effort is normal.      Breath sounds: Normal breath sounds.   Neurological:      Mental Status: She is alert.      Comments: Able to follow commands however patient non verbal            Diagnostic Studies      EKG: Afib RVR  Imaging: I have personally reviewed pertinent reports.   and I have personally reviewed pertinent films in PACS     Medications:  Scheduled PRN   apixaban, 5 mg, BID  cefazolin, 2,000 mg, Q8H  chlorhexidine, 15 mL, Q12H FAMILIA  cholecalciferol, 1,000 Units, Daily  hydrocortisone sodium succinate, 25 mg, Q8H FAMILIA  metoprolol tartrate, 25 mg, Q12H FAMILIA  pantoprazole, 40 mg, Q24H FAMILIA  white petrolatum-mineral oil, , TID      acetaminophen, 650 mg, Q4H PRN  Albumin 5%, 25 g, Once PRN  albuterol, 2 puff, Q6H PRN  aluminum-magnesium hydroxide-simethicone, 30 mL, Q6H PRN  docusate sodium, 100 mg, BID PRN  metoprolol, 5 mg, Q6H PRN  ondansetron, 4 mg, Q6H PRN       Continuous    multi-electrolyte, 50 mL/hr, Last Rate: 50 mL/hr (01/24/24 0857)  norepinephrine, 1-30 mcg/min         Labs:    CBC    Recent Labs     01/23/24  0549 01/24/24  0250   WBC 8.62 8.09   HGB 12.6 11.7   HCT 41.5 38.3    172     BMP    Recent Labs     01/23/24  0549 01/24/24  0250   SODIUM 143 142   K 4.2 4.1    106   CO2 26 21   AGAP 13 15   BUN 19 23   CREATININE 1.75* 1.94*   CALCIUM 8.4 8.3*       Coags    Recent Labs     01/23/24  0013   INR 1.46*   PTT 29        Additional Electrolytes  Recent Labs     01/23/24  0549 01/24/24  0250   MG 1.6* 2.3   PHOS 2.5 3.4          Blood Gas    No  recent results  No recent results LFTs  Recent Labs     01/23/24  0549 01/24/24  0250   ALT 21 19   AST 33 38   ALKPHOS 45 38   ALB 2.9* 2.7*   TBILI 0.93 0.84       Infectious  Recent Labs     01/23/24  0013 01/24/24  0250   PROCALCITONI 0.17 3.29*     Glucose  Recent Labs     01/23/24  0013 01/23/24  0549 01/24/24  0250   GLUC 76 67 68            Loniit Lyubov, DO

## 2024-01-24 NOTE — ASSESSMENT & PLAN NOTE
Creatinine peaked at 2.7 on 1/25; this is prerenal in the setting of septic shock  Responded to IV fluid hydration  Continue IVF; recheck BMP in the morning

## 2024-01-24 NOTE — ASSESSMENT & PLAN NOTE
Urinalysis shows more of RBCs than WBCs with no bacteria.  Still qualified as abnormal, the patient does not have overt findings of urinary tract infection (pyuria etc.)  Patient has chronic dermatitis and currently has stage I sacral decubitus ulceration.  Questionable presence of cellulitis as source of infection.    Discontinue cefepime and vanc  See sepsis section.

## 2024-01-24 NOTE — CASE MANAGEMENT
Case Management Assessment & Discharge Planning Note    Patient name Bhavna Al  [id number]  : 1950 Date 2024       Current Admission Date: 2024  Current Admission Diagnosis:Severe sepsis (HCC)   Patient Active Problem List    Diagnosis Date Noted    Localized swelling on left hand 2023    Chronic kidney disease 2023    Febrile illness 2023    Dermatitis associated with incontinence 2023    Right shoulder pain 12/15/2022    Abnormal urinalysis 2022    Ambulatory dysfunction 2022    Hyperuricemia 2022    Chronic diastolic heart failure (HCC) 2022    Acute bronchitis 2022    Assistance needed with transportation 09/15/2022    Abnormal CT of the chest 2022    Gram-positive cocci bacteremia 2022    Pustular psoriasis 2022    Elevated troponin 2022    COVID-19 2022    Paroxysmal atrial fibrillation (HCC) 01/10/2022    Severe sepsis (HCC) 01/10/2022    Hyperparathyroidism ([facility]) 2021    Murmur, cardiac 2021    BPPV (benign paroxysmal positional vertigo) 2018    Seasonal allergic rhinitis due to pollen 2018    Cellulitis 10/27/2017    Osteoporosis 2016    SIRS (systemic inflammatory response syndrome) ([facility]) 2016    Rheumatoid arthritis ([facility]) 2016    GERD (gastroesophageal reflux disease) 2016    Sarcoid 2016    Morbid obesity ([facility]) 2016    Vitamin D deficiency 2015    Benign essential hypertension 2014    Lumbar radiculopathy 2014      LOS (days): 1  Geometric Mean LOS (GMLOS) (days): 5.1  Days to GMLOS:3.6     OBJECTIVE:    Risk of Unplanned Readmission Score: 21.21         Current admission status: Inpatient       Preferred Pharmacy:   [id number] - [place], [place] - [address]  [address]  [place] [address]  Phone: [phone] Fax: [phone]    [facility]  [facility] [address]  [address] [place] [address]  Phone: [phone] Fax: [phone]    Primary Care Provider: Nya Taveras DO    Primary Insurance: [facility] REP  Secondary Insurance: [facility]    ASSESSMENT:  Active Health Care Proxies       Rhoda Al [facility] Care Representative - Child   Primary Phone: [phone] (Home)                           Readmission Root Cause  30 Day Readmission: No    Patient Information  Admitted from:: Facility ([facility])  Mental Status: Alert  During Assessment patient was accompanied by: Not accompanied during assessment  Assessment information provided by:: Other - please comment (granddaughter)  Primary Caregiver: Other (Comment)  Caregiver's Name:: [facility] Staff  Support Systems: Daughter, Family members, Other (Comment) (Staff at [facility])  County of Residence: [place]  What [facility] do you live in?: [place]  Type of Current Residence: Facility ([facility])  Upon entering residence, is there a bedroom on the main floor (no further steps)?: Yes  Upon entering residence, is there a bathroom on the main floor (no further steps)?: Yes  Living Arrangements: Other (Comment) ([facility])  Is patient a ?: No    Activities of Daily Living Prior to Admission  Functional Status: Total dependent  Completes ADLs independently?: No  Level of ADL dependence: Total Dependent  Ambulates independently?: No (Pt is bedbound)  Level of ambulatory dependence: Total Dependent  Does patient use assisted devices?: Yes  Assisted Devices (DME) used: Wheelchair  Does patient currently own DME?: Yes  What DME does the patient currently own?: Wheelchair  Does patient have a history of Outpatient Therapy (PT/OT)?: No  Does the patient have a history of Short-Term Rehab?: Yes (currently resides at [facility])  Does patient have a history of HHC?: No  Does patient currently have HHC?: No         Patient  Information Continued  Income Source: SSI/SSD  Does patient have prescription coverage?: Yes  Does patient receive dialysis treatments?: No  Does patient have a history of substance abuse?: No  Does patient have a history of Mental Health Diagnosis?: No         Means of Transportation  Means of Transport to Appts:: None      Housing Stability: Low Risk  (1/24/2024)    Housing Stability Vital Sign     Unable to Pay for Housing in the Last Year: No     Number of Places Lived in the Last Year: 1     Unstable Housing in the Last Year: No   Food Insecurity: No Food Insecurity (1/24/2024)    Hunger Vital Sign     Worried About Running Out of Food in the Last Year: Never true     Ran Out of Food in the Last Year: Never true   Transportation Needs: No Transportation Needs (1/24/2024)    PRAPARE - Transportation     Lack of Transportation (Medical): No     Lack of Transportation (Non-Medical): No   Utilities: Not At Risk (1/24/2024)    [facility] Utilities     Threatened with loss of utilities: No       DISCHARGE DETAILS:    Discharge planning discussed with:: pt granddaughter at bedside  [place] of Choice: Yes  Comments - [place] of Choice: Facility return referral submitted to [facility]  CM contacted family/caregiver?: Yes  Were Treatment Team discharge recommendations reviewed with patient/caregiver?: Yes  Did patient/caregiver verbalize understanding of patient care needs?: N/A- going to facility  Were patient/caregiver advised of the risks associated with not following Treatment Team discharge recommendations?: Yes    Contacts  Patient Contacts: Rhoda Al, daughter  Relationship to Patient:: Family  Contact Method: Phone  Phone Number: [phone]  Reason/Outcome: Discharge Planning, Emergency Contact, Continuity of Care    Requested Home Health Care         Is the patient interested in HHC at discharge?: No    DME Referral Provided  Referral made for DME?: No    Other Referral/Resources/Interventions  Provided:  Interventions: Facility Return    Would you like to participate in our [facility] service program?  : No - Declined    Treatment Team Recommendation: Facility Return  Discharge Destination Plan:: Facility Return  Transport at Discharge : BLS Ambulance                                      Additional Comments: CM introduced herself and role to pt and pt granddaughter at bedside. Pt was alseep during the assessment. Pt is a LTC resident at [facility]. Facility return referral submitted and reserved with [facility]. CM will continue to follow as needed.    Accepting Facility Name, City & State : [facility]  Receiving Facility/Agency Phone Number: [phone]  Facility/Agency Fax Number: [phone]

## 2024-01-24 NOTE — QUICK NOTE
Patient's granddaughter updated at bedside with current treatment plan and possible upgrade to critical care.  Answered all questions.    Shantel Carvajal DO  [facility]  Internal Medicine, PGY-3

## 2024-01-24 NOTE — SPEECH THERAPY NOTE
Speech Language/Pathology    Speech/Language Pathology Progress Note    Patient Name: Bhavna Al  Today's Date: 2024     Problem List  Principal Problem:    Severe sepsis (HCC)  Active Problems:    Rheumatoid arthritis (HCC)    Benign essential hypertension    Vitamin D deficiency    Cellulitis    Paroxysmal atrial fibrillation (HCC)    Chronic diastolic heart failure (HCC)    Ambulatory dysfunction    Abnormal urinalysis    Dermatitis associated with incontinence    Chronic kidney disease       Past Medical History  Past Medical History:   Diagnosis Date    Abnormal thyroid function test     last assessed: 2015     Arthritis     Caries     last assessed: 2016     Continuous opioid dependence (HCC) 2021    Edema of right lower extremity     last assessed: 2015     GERD (gastroesophageal reflux disease)     Hypertension     Medicare annual wellness visit, subsequent 2021    Positive blood culture 3/11/2022    Sarcoid         Past Surgical History  Past Surgical History:   Procedure Laterality Date     SECTION      MULTIPLE TOOTH EXTRACTIONS N/A 2016    Procedure: Surgical extraction of teeth 2, 18, 19, 30, 31; incision and drainage of left subperiosteal abscess ;  Surgeon: Clara Cevallos DMD;  Location: [facility];  Service:      Subjective:  Patient is asleep and lethargic during session, but maintains better alertness than evaluation yesterday.     Objective:  Patient is seen for dysphagia therapy. She requires repositioning in bed before PO trials. Oral care is provided. Patient is assessed with puree solids. Bolus transfer is min prolonged, and required cue x2 to initiate swallow. No oral residue observed. She began gagging with a small amount of vomit once puree trials were complete. HR increased to 133, but stabilized once vomiting ceased. Patient's granddaughter reports that the patient has been nauseous yesterday as well. No other overt s/s of  aspiration observed. Nectar thick and honey thick trials will be administered at a later time.     Assessment:  Patient tolerated puree solid trials well, but became nauseous once trials were complete.     Plan/Recommendations:  Continue ST. F/u later today to assess tolerance of liquid trials.

## 2024-01-24 NOTE — ASSESSMENT & PLAN NOTE
Currently rate controlled  Will restart home metoprolol at 12.5 mg twice daily  Continue home Eliquis 5 mg twice daily for stroke prophylaxis

## 2024-01-24 NOTE — OCCUPATIONAL THERAPY NOTE
Occupational Therapy Screen Note        Patient Name: Bhavna Al  Today's Date: 1/24/2024 01/24/24 1146   OT Last Visit   OT Visit Date 01/24/24   Note Type   Note type Screen     OT consult received, chart reviewed. Per EMR, pt admitted from [facility], where she has full assistance for ADL and IADL, and has assistance for transfers using a vamshi lift. This indicates pt does not require skilled acute OT services at this time, will D/C acute OT services.         Naima Piedra, MADISYN, OTR/L

## 2024-01-24 NOTE — PROGRESS NOTES
INTERNAL MEDICINE RESIDENCY PROGRESS NOTE     Name: Bhavna Al   Age & Sex: 73 y.o. female   MRN: [id number]  Unit/Bed#: [facility] 424-01   Encounter: [id number]  Team: SLIM    PATIENT INFORMATION     Name: Bhavna Al   Age & Sex: 73 y.o. female   MRN: [id number]  Hospital Stay Days: 1    ASSESSMENT/PLAN     Principal Problem:    Severe sepsis (HCC)  Active Problems:    Paroxysmal atrial fibrillation (HCC)    Rheumatoid arthritis (HCC)    Benign essential hypertension    Vitamin D deficiency    Cellulitis    Chronic diastolic heart failure (HCC)    Ambulatory dysfunction    Abnormal urinalysis    Dermatitis associated with incontinence    Chronic kidney disease      * Severe sepsis (HCC)  Assessment & Plan  Presented with metabolic encephalopathy, meeting sepsis criteria   S/p 1.5 L NS on admission  Suspected source cellulitis   1/2 blood Gram stain GPC in clusters  BCID MSSA and Staph lugdunensis    Plan:  Start Ancef 2 grams q 8 hrs  Discontinue vanc, cefepime  Follow up blood culture  TTE  Hypotensive to 80s/40s this morning - additional 1 L isolyte and albumin 25 g given  Hypotensive to 70s-40s after above IVF, will give additional albumin and contact critical care for evaluation and possible upgrade  Start stress dose steroids IV hydrocortisone 25 mg q 8 hrs  Continue SD2  Appreciate infectious disease consultation   Trend fever curve, leukocytosis    Paroxysmal atrial fibrillation (HCC)  Assessment & Plan  Unable to tolerate oral intake and did not receive oral metoprolol, A-fib RVR at time of evaluation 1/23   As needed IV metoprolol   Monitor on telemetry until rate controlled   Continue PTA Eliquis     Chronic kidney disease  Assessment & Plan  Lab Results   Component Value Date    EGFR 24 01/24/2024    EGFR 28 01/23/2024    EGFR 27 01/23/2024    CREATININE 1.94 (H) 01/24/2024    CREATININE 1.75 (H) 01/23/2024    CREATININE 1.77 (H) 01/23/2024   CKD 3   Creatinine baseline   N.p.o. currently per speech -  tolerated pureed solid today, will follow up with liquids later today  Trend creatinine  UA unremarkable    Dermatitis associated with incontinence  Assessment & Plan  Patient with dermatitis.  Continue Eucerin.  In order to avoid further skin infection possibly portal of entry being sites of skin breakdown, will put Corey catheter.    Abnormal urinalysis  Assessment & Plan  Urinalysis shows more of RBCs than WBCs with no bacteria.  Still qualified as abnormal, the patient does not have overt findings of urinary tract infection (pyuria etc.)  Patient has chronic dermatitis and currently has stage I sacral decubitus ulceration.  Questionable presence of cellulitis as source of infection.    Discontinue cefepime and vanc  See sepsis section.    Ambulatory dysfunction  Assessment & Plan  Resides in SNF for rehabilitation therapy  Appreciate case management consult    Chronic diastolic heart failure (HCC)  Assessment & Plan  Wt Readings from Last 3 Encounters:   01/24/24 111 kg (245 lb 6 oz)   10/06/23 117 kg (258 lb)   06/01/23 117 kg (258 lb)   Home furosemide on hold in setting of severe sepsis   Patient is lying flat with oximetry 97% on room air during evaluation, will continue IV hydration   Monitor volume status  Corey catheter placed, continue for 48 hours due to severe sepsis.    Cellulitis  Assessment & Plan  Patient with CKD.  This does not meet criteria for AMANDA.    Vitamin D deficiency  Assessment & Plan  Continue cholecalciferol.    Benign essential hypertension  Assessment & Plan  Normotensive in setting of severe sepsis   Continue metoprolol for A-fib RVR   Trend blood pressures    Rheumatoid arthritis (HCC)  Assessment & Plan  Continue prednisone 5 mg twice daily.  Hydroxychloroquine on hold in the setting of severe sepsis        Disposition: Remains inpatient requiring IV antibiotic and severe sepsis     SUBJECTIVE     Patient seen and examined. No acute events overnight. ROS limited due to acute  illness.  Patient provides one word answers and endorses abdominal pain and nausea.    OBJECTIVE     Vitals:    24 1043 24 1056 24 1115 24 1130   BP:  (!) 72/40 (!) 108/49 (!) 114/44   BP Location:  Left arm Right arm    Pulse:       Resp: 16      Temp: 97.6 °F (36.4 °C)      TempSrc:       SpO2:       Weight:          Temperature:   Temp (24hrs), Av.9 °F (37.2 °C), Min:97.6 °F (36.4 °C), Max:100.7 °F (38.2 °C)    Temperature: 97.6 °F (36.4 °C)  Intake & Output:  I/O          0701   0700  0701   0700  0701   0700    P.O.   0    I.V. (mL/kg) 500  1000 (9)    IV Piggyback 1050  500    Total Intake(mL/kg) 1550  1500 (13.5)    Urine (mL/kg/hr)  750 (0.3) 75 (0.1)    Total Output  750 75    Net +1550 -750 +1425                 Weights:        Body mass index is 43.47 kg/m².  Weight (last 2 days)       Date/Time Weight    24 0600 111 (245.37)          Physical Exam  Vitals reviewed.   Constitutional:       Appearance: She is obese. She is ill-appearing.   HENT:      Head: Normocephalic and atraumatic.      Nose: No rhinorrhea.   Eyes:      General: No scleral icterus.  Cardiovascular:      Rate and Rhythm: Tachycardia present. Rhythm irregular.      Pulses: Normal pulses.      Heart sounds: Normal heart sounds. No murmur heard.  Pulmonary:      Effort: Pulmonary effort is normal. No respiratory distress.      Breath sounds: Decreased breath sounds present. No wheezing, rhonchi or rales.   Abdominal:      General: There is no distension.      Palpations: Abdomen is soft.      Tenderness: There is no abdominal tenderness. There is no guarding or rebound.   Musculoskeletal:      Right lower leg: No edema.      Left lower leg: No edema.   Skin:     General: Skin is warm and dry.      Coloration: Skin is not jaundiced.      Findings: Erythema (Right lower abodmen, warm to touch) present.   Neurological:      Mental Status: She is alert.      Comments: Makes eye  contact       LABORATORY DATA     Labs: I have personally reviewed pertinent reports.  Results from last 7 days   Lab Units 01/24/24  0250 01/23/24  0549 01/23/24  0013   WBC Thousand/uL 8.09 8.62 6.99   HEMOGLOBIN g/dL 11.7 12.6 14.2   HEMATOCRIT % 38.3 41.5 46.6*   PLATELETS Thousands/uL 172 204 212   NEUTROS PCT %  --  79* 72   MONOS PCT %  --  8 6   EOS PCT %  --  2 2      Results from last 7 days   Lab Units 01/24/24  0250 01/23/24  0549 01/23/24  0013   POTASSIUM mmol/L 4.1 4.2 4.7   CHLORIDE mmol/L 106 104 101   CO2 mmol/L 21 26 28   BUN mg/dL 23 19 19   CREATININE mg/dL 1.94* 1.75* 1.77*   CALCIUM mg/dL 8.3* 8.4 9.6   ALK PHOS U/L 38 45 53   ALT U/L 19 21 24   AST U/L 38 33 30     Results from last 7 days   Lab Units 01/24/24  0250 01/23/24  0549   MAGNESIUM mg/dL 2.3 1.6*     Results from last 7 days   Lab Units 01/24/24  0250 01/23/24  0549   PHOSPHORUS mg/dL 3.4 2.5      Results from last 7 days   Lab Units 01/23/24  0013   INR  1.46*   PTT seconds 29     Results from last 7 days   Lab Units 01/24/24  0828   LACTIC ACID mmol/L 2.7*           IMAGING & DIAGNOSTIC TESTING     Radiology Results: I have personally reviewed pertinent reports.  CT chest abdomen pelvis wo contrast    Result Date: 1/23/2024  Impression: 1. No evidence of acute cardiopulmonary process. 2. Findings compatible with constipation. No evidence of colitis, diverticulitis or bowel obstruction. Workstation performed: [id number]     Other Diagnostic Testing: I have personally reviewed pertinent reports.    ACTIVE MEDICATIONS     Current Facility-Administered Medications   Medication Dose Route Frequency    acetaminophen (TYLENOL) rectal suppository 650 mg  650 mg Rectal Q4H PRN    albumin human (FLEXBUMIN) 5 % injection 25 g  25 g Intravenous Once PRN    albuterol (PROVENTIL HFA,VENTOLIN HFA) inhaler 2 puff  2 puff Inhalation Q6H PRN    aluminum-magnesium hydroxide-simethicone (MAALOX) oral suspension 30 mL  30 mL Oral Q6H PRN    apixaban  "(ELIQUIS) tablet 5 mg  5 mg Oral BID    ceFAZolin (ANCEF) IVPB (premix in dextrose) 2,000 mg 50 mL  2,000 mg Intravenous Q8H    cholecalciferol (VITAMIN D3) tablet 1,000 Units  1,000 Units Oral Daily    docusate sodium (COLACE) capsule 100 mg  100 mg Oral BID PRN    hydrocortisone (Solu-CORTEF) injection 25 mg  25 mg Intravenous Q8H FAMILIA    metoprolol (LOPRESSOR) injection 5 mg  5 mg Intravenous Q6H PRN    metoprolol tartrate (LOPRESSOR) tablet 25 mg  25 mg Oral Q12H [facility]    multi-electrolyte (PLASMALYTE-A/ISOLYTE-S PH 7.4) IV solution  50 mL/hr Intravenous Continuous    ondansetron (ZOFRAN) injection 4 mg  4 mg Intravenous Q6H PRN    pantoprazole (PROTONIX) injection 40 mg  40 mg Intravenous Q24H FAMILIA    white petrolatum-mineral oil (EUCERIN,HYDROCERIN) cream   Topical TID       VTE Pharmacologic Prophylaxis: Eliquis  VTE Mechanical Prophylaxis: sequential compression device    Portions of the record may have been created with voice recognition software.  Occasional wrong word or \"sound a like\" substitutions may have occurred due to the inherent limitations of voice recognition software.  Read the chart carefully and recognize, using context, where substitutions have occurred.  ==  Shantel Carvajal DO  [facility]  Internal Medicine Residency PGY-3     "

## 2024-01-24 NOTE — PHYSICAL THERAPY NOTE
Physical Therapy Screen    Patient Name: Bhavna Al    Today's Date: 2024     Problem List  Principal Problem:    Severe sepsis (HCC)  Active Problems:    Rheumatoid arthritis (HCC)    Benign essential hypertension    Vitamin D deficiency    Cellulitis    Paroxysmal atrial fibrillation (HCC)    Chronic diastolic heart failure (HCC)    Ambulatory dysfunction    Abnormal urinalysis    Dermatitis associated with incontinence    Chronic kidney disease       Past Medical History  Past Medical History:   Diagnosis Date    Abnormal thyroid function test     last assessed: 2015     Arthritis     Caries     last assessed: 2016     Continuous opioid dependence (HCC) 2021    Edema of right lower extremity     last assessed: 2015     GERD (gastroesophageal reflux disease)     Hypertension     Medicare annual wellness visit, subsequent 2021    Positive blood culture 3/11/2022    Sarcoid         Past Surgical History  Past Surgical History:   Procedure Laterality Date     SECTION      MULTIPLE TOOTH EXTRACTIONS N/A 2016    Procedure: Surgical extraction of teeth 2, 18, 19, 30, 31; incision and drainage of left subperiosteal abscess ;  Surgeon: Clara Cevallos DMD;  Location: [facility];  Service:         PT order received; chart reviewed incl prior PT records; noted pt is a resident of NH and was w/c-bed bound at baseline w/ Mirian lift (or (A)x2) required for transfers per EMR; based on above, no immediate PT needs identified while in the hospital; anticipate pt will return to SNF level of care upon D/C when medically cleared; will otherwise sign off; RN and CM informed.    Rolando Krishna

## 2024-01-24 NOTE — ASSESSMENT & PLAN NOTE
Not in exacerbation  Home Lasix on hold given volume issues  Currently receiving IVF given hyponatremia and poor p.o. intake; when oral intake has improved can restart home diuretics

## 2024-01-24 NOTE — NURSING NOTE
Received [facility] consult for [facility]. Chart review shows that pt has CKD 3 and GFR 24 and would not be a candidate at this time. Unable to find any visit with nephrology or note specific to.  Consult will be completed at this time. Please reach out to [facility] team with new needs.

## 2024-01-24 NOTE — PROGRESS NOTES
Patient:  FARIDA SALCEDO    MRN:  [id number]    Aidin Request ID:  [id number]    Level of care reserved:  Skilled Nursing Facility    Partner Reserved:  [facility], [place] [address] [phone]    Clinical needs requested:    Geography searched:  10 miles around [phone]    Start of Service:    Request sent:  3:18pm EST on 1/24/2024 by Kate Vickers    Partner reserved:  3:33pm EST on 1/24/2024 by Kate Vickers    Choice list shared:  3:33pm EST on 1/24/2024 by Kate Vickers

## 2024-01-24 NOTE — WOUND OSTOMY CARE
Consult Note - Wound   Bhavna P Al 73 y.o. female MRN: [id number]  Unit/Bed#: [facility] 424-01 Encounter: [id number]        History and Present Illness:  Patient is a 74 yo female that was admitted to [facility] for treatment of severe sepsis. Patient has a PMH of morbid obesity with dermatitis, HTN, GERD. Patient is a max assist for turning and repositioning. Patient is incontinent of bowel and bladder. Granddaughter present at bedside for assessment. On assessment, patient is lying on regular mattress. P-500 low air loss mattress ordered for patient. Patient with generalized dry skin.     Wound Care was consulted for sacrum and thigh     Assessment Findings:   B/L heels are dry intact and karly with no skin loss or wounds present. Recommend preventative Hydraguard Cream and proper offloading/ repositioning.      POA Left Posterior Thigh Evolving Deep Tissue Pressure Injury: irregular in shape areas of full thickness skin loss. Wound bed is mix of 20% dark purple non-blanchable tissue, 50% beefy red tissue and 30% yellow slough tissue. Maia-wound is fragile. Scant serosanguineous drainage noted. Recommend silicone bordered foam dressing to area. Deep Tissue Pressure Injury wounds have the potential to evolve into unstageable, stage III or stage IV wounds.    POA Left Back Evolving Deep Tissue Pressure Injury: 2 areas of partial thickness skin loss measured together. Wound bed is 70% dark purple non-blanchable tissue and 30% beefy red bleeding tissue. Maia-wound is fragile. Scant sanguinous drainage noted. Recommend silicone bordered foam dressing to area. Deep Tissue Pressure Injury wounds have the potential to evolve into unstageable, stage III or stage IV wounds.  Mid Sacrum MASD/IAD: linear in shape area of partial thickness skin loss located along the intergluteal cleft. Wound bed is pink/ beefy red in color and blanchable. Maia-wound is hyperpigmented. Scant sanguinous drainage noted. Recommend calazime cream to  area.     No induration, fluctuance, odor, warmth/temperature differences, redness, or purulence noted to the above noted wounds and skin areas assessed. New dressings applied per orders listed below. Patient tolerated well- no s/s of non-verbal pain or discomfort observed during the encounter. Bedside nurse aware of plan of care. See flow sheets for more detailed assessment findings.      Orders listed below and wound care will continue to follow, call or tiger text with questions.     Skin Care Plan:  1-Cleanse sacro-buttocks with soap and water. Apply calazime cream to B/L Sacro-Buttocks TID and PRN episodes of incontinence.   2-Turn/reposition q2h or when medically stable for pressure re-distribution on skin .  3-Elevate heels to offload pressure.  4-Moisturize skin daily with skin nourishing cream  5-Ehob cushion in chair when out of bed.  6-Preventative Hydraguard to bilateral heels BID and PRN.   7-Left Back and Left Posterior Thigh Wounds: Cleanse with NSS and pat dry. Apply silicone bordered foam dressings to areas. Moo with T for Treatment and change every other day or PRN soilage/displacement.   8-P-500 Low air loss mattress ordered for patient.       Wounds:  Wound 07/11/22 MASD Sacrum (Active)   Wound Image   01/24/24 1032   Wound Description Beefy red;Bleeding 01/24/24 1032   Maia-wound Assessment Fragile;Hyperpigmented 01/24/24 1032   Wound Length (cm) 5 cm 01/24/24 1032   Wound Width (cm) 8 cm 01/24/24 1032   Wound Depth (cm) 0.1 cm 01/24/24 1032   Wound Surface Area (cm^2) 40 cm^2 01/24/24 1032   Wound Volume (cm^3) 4 cm^3 01/24/24 1032   Calculated Wound Volume (cm^3) 4 cm^3 01/24/24 1032   Change in Wound Size % -700 01/24/24 1032   Drainage Amount Scant 01/24/24 1032   Drainage Description Sanguineous 01/24/24 1032   Non-staged Wound Description Partial thickness 01/24/24 1032   Treatments Cleansed;Site care 01/24/24 1032   Dressing Protective barrier 01/24/24 1032   Dressing Changed Changed  01/24/24 1032   Patient Tolerance Tolerated well 01/24/24 1032   Dressing Status Intact 01/24/24 1032       Wound 01/23/24 Pressure Injury Thigh Posterior;Left (Active)   Wound Image   01/24/24 1031   Wound Description Beefy red;Fragile;Light purple;Non-blanchable erythema;Yellow 01/24/24 1032   Pressure Injury Stage DTPI 01/24/24 1032   Maia-wound Assessment Fragile 01/24/24 1032   Wound Length (cm) 7 cm 01/24/24 1032   Wound Width (cm) 12 cm 01/24/24 1032   Wound Depth (cm) 0.2 cm 01/24/24 1032   Wound Surface Area (cm^2) 84 cm^2 01/24/24 1032   Wound Volume (cm^3) 16.8 cm^3 01/24/24 1032   Calculated Wound Volume (cm^3) 16.8 cm^3 01/24/24 1032   Drainage Amount Scant 01/24/24 1032   Drainage Description Sanguineous 01/24/24 1032   Non-staged Wound Description Full thickness 01/24/24 1032   Treatments Cleansed;Irrigation with NSS;Site care 01/24/24 1032   Dressing Foam, Silicon (eg. Allevyn, etc) 01/24/24 1032   Dressing Changed New 01/24/24 1032   Patient Tolerance Tolerated well 01/24/24 1032   Dressing Status Clean;Dry;Intact 01/24/24 1032       Wound 01/24/24 Pressure Injury Back Left (Active)   Wound Image   01/24/24 1519   Wound Description Beefy red;Bleeding;Light purple;Non-blanchable erythema 01/24/24 1519   Pressure Injury Stage DTPI 01/24/24 1519   Maia-wound Assessment Fragile 01/24/24 1519   Wound Length (cm) 3 cm 01/24/24 1519   Wound Width (cm) 8 cm 01/24/24 1519   Wound Depth (cm) 0.1 cm 01/24/24 1519   Wound Surface Area (cm^2) 24 cm^2 01/24/24 1519   Wound Volume (cm^3) 2.4 cm^3 01/24/24 1519   Calculated Wound Volume (cm^3) 2.4 cm^3 01/24/24 1519   Drainage Amount Scant 01/24/24 1519   Drainage Description Sanguineous 01/24/24 1519   Non-staged Wound Description Partial thickness 01/24/24 1519   Treatments Cleansed;Site care 01/24/24 1519   Dressing Foam, Silicon (eg. Allevyn, etc) 01/24/24 1519   Dressing Changed New 01/24/24 1519   Patient Tolerance Tolerated well 01/24/24 1519   Dressing  Status Clean;Intact;Dry 01/24/24 1519               Batsheva Lyons RN, BSN, CWOCN

## 2024-01-24 NOTE — ASSESSMENT & PLAN NOTE
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

## 2024-01-25 LAB
ALBUMIN SERPL BCP-MCNC: 3.3 G/DL (ref 3.5–5)
ALP SERPL-CCNC: 33 U/L (ref 34–104)
ALT SERPL W P-5'-P-CCNC: 19 U/L (ref 7–52)
ANION GAP SERPL CALCULATED.3IONS-SCNC: 14 MMOL/L
ANION GAP SERPL CALCULATED.3IONS-SCNC: 15 MMOL/L
ANION GAP SERPL CALCULATED.3IONS-SCNC: 26 MMOL/L
AST SERPL W P-5'-P-CCNC: 41 U/L (ref 13–39)
BASOPHILS # BLD AUTO: 0 THOUSANDS/ÂΜL (ref 0–0.1)
BASOPHILS NFR BLD AUTO: 0 % (ref 0–1)
BILIRUB SERPL-MCNC: 0.46 MG/DL (ref 0.2–1)
BUN SERPL-MCNC: 21 MG/DL (ref 5–25)
BUN SERPL-MCNC: 25 MG/DL (ref 5–25)
BUN SERPL-MCNC: 28 MG/DL (ref 5–25)
CALCIUM ALBUM COR SERPL-MCNC: 9 MG/DL (ref 8.3–10.1)
CALCIUM SERPL-MCNC: 6.5 MG/DL (ref 8.4–10.2)
CALCIUM SERPL-MCNC: 7.8 MG/DL (ref 8.4–10.2)
CALCIUM SERPL-MCNC: 8.4 MG/DL (ref 8.4–10.2)
CHLORIDE SERPL-SCNC: 103 MMOL/L (ref 96–108)
CHLORIDE SERPL-SCNC: 108 MMOL/L (ref 96–108)
CHLORIDE SERPL-SCNC: 92 MMOL/L (ref 96–108)
CO2 SERPL-SCNC: 20 MMOL/L (ref 21–32)
CO2 SERPL-SCNC: 23 MMOL/L (ref 21–32)
CO2 SERPL-SCNC: 23 MMOL/L (ref 21–32)
CREAT SERPL-MCNC: 2.14 MG/DL (ref 0.6–1.3)
CREAT SERPL-MCNC: 2.71 MG/DL (ref 0.6–1.3)
CREAT SERPL-MCNC: 4 MG/DL (ref 0.6–1.3)
EOSINOPHIL # BLD AUTO: 0.01 THOUSAND/ÂΜL (ref 0–0.61)
EOSINOPHIL NFR BLD AUTO: 0 % (ref 0–6)
ERYTHROCYTE [DISTWIDTH] IN BLOOD BY AUTOMATED COUNT: 14.6 % (ref 11.6–15.1)
ERYTHROCYTE [DISTWIDTH] IN BLOOD BY AUTOMATED COUNT: 14.6 % (ref 11.6–15.1)
GFR SERPL CREATININE-BSD FRML MDRD: 10 ML/MIN/1.73SQ M
GFR SERPL CREATININE-BSD FRML MDRD: 16 ML/MIN/1.73SQ M
GFR SERPL CREATININE-BSD FRML MDRD: 22 ML/MIN/1.73SQ M
GLUCOSE SERPL-MCNC: 117 MG/DL (ref 65–140)
GLUCOSE SERPL-MCNC: 123 MG/DL (ref 65–140)
GLUCOSE SERPL-MCNC: 137 MG/DL (ref 65–140)
GLUCOSE SERPL-MCNC: 152 MG/DL (ref 65–140)
GLUCOSE SERPL-MCNC: 412 MG/DL (ref 65–140)
GLUCOSE SERPL-MCNC: 76 MG/DL (ref 65–140)
GLUCOSE SERPL-MCNC: 88 MG/DL (ref 65–140)
GLUCOSE SERPL-MCNC: 92 MG/DL (ref 65–140)
GLUCOSE SERPL-MCNC: 93 MG/DL (ref 65–140)
HCT VFR BLD AUTO: 30.2 % (ref 34.8–46.1)
HCT VFR BLD AUTO: 33.6 % (ref 34.8–46.1)
HGB BLD-MCNC: 10.1 G/DL (ref 11.5–15.4)
HGB BLD-MCNC: 9 G/DL (ref 11.5–15.4)
IMM GRANULOCYTES # BLD AUTO: 0.02 THOUSAND/UL (ref 0–0.2)
IMM GRANULOCYTES NFR BLD AUTO: 0 % (ref 0–2)
INR PPP: 2.02 (ref 0.84–1.19)
LACTATE SERPL-SCNC: 1.5 MMOL/L (ref 0.5–2)
LYMPHOCYTES # BLD AUTO: 0.35 THOUSANDS/ÂΜL (ref 0.6–4.47)
LYMPHOCYTES NFR BLD AUTO: 7 % (ref 14–44)
MCH RBC QN AUTO: 27.1 PG (ref 26.8–34.3)
MCH RBC QN AUTO: 27.5 PG (ref 26.8–34.3)
MCHC RBC AUTO-ENTMCNC: 29.8 G/DL (ref 31.4–37.4)
MCHC RBC AUTO-ENTMCNC: 30.1 G/DL (ref 31.4–37.4)
MCV RBC AUTO: 90 FL (ref 82–98)
MCV RBC AUTO: 92 FL (ref 82–98)
MONOCYTES # BLD AUTO: 0.2 THOUSAND/ÂΜL (ref 0.17–1.22)
MONOCYTES NFR BLD AUTO: 4 % (ref 4–12)
NEUTROPHILS # BLD AUTO: 4.13 THOUSANDS/ÂΜL (ref 1.85–7.62)
NEUTS SEG NFR BLD AUTO: 89 % (ref 43–75)
NRBC BLD AUTO-RTO: 0 /100 WBCS
PLATELET # BLD AUTO: 122 THOUSANDS/UL (ref 149–390)
PMV BLD AUTO: 12.1 FL (ref 8.9–12.7)
POTASSIUM SERPL-SCNC: 3.5 MMOL/L (ref 3.5–5.3)
POTASSIUM SERPL-SCNC: 4 MMOL/L (ref 3.5–5.3)
POTASSIUM SERPL-SCNC: 4.4 MMOL/L (ref 3.5–5.3)
PROCALCITONIN SERPL-MCNC: 10.15 NG/ML
PROT SERPL-MCNC: 6.4 G/DL (ref 6.4–8.4)
PROTHROMBIN TIME: 22.5 SECONDS (ref 11.6–14.5)
RBC # BLD AUTO: 3.27 MILLION/UL (ref 3.81–5.12)
RBC # BLD AUTO: 3.73 MILLION/UL (ref 3.81–5.12)
SODIUM SERPL-SCNC: 138 MMOL/L (ref 135–147)
SODIUM SERPL-SCNC: 141 MMOL/L (ref 135–147)
SODIUM SERPL-SCNC: 145 MMOL/L (ref 135–147)
WBC # BLD AUTO: 4.71 THOUSAND/UL (ref 4.31–10.16)
WBC # BLD AUTO: 6 THOUSAND/UL (ref 4.31–10.16)

## 2024-01-25 PROCEDURE — 92526 ORAL FUNCTION THERAPY: CPT

## 2024-01-25 PROCEDURE — 85025 COMPLETE CBC W/AUTO DIFF WBC: CPT | Performed by: STUDENT IN AN ORGANIZED HEALTH CARE EDUCATION/TRAINING PROGRAM

## 2024-01-25 PROCEDURE — NC001 PR NO CHARGE: Performed by: STUDENT IN AN ORGANIZED HEALTH CARE EDUCATION/TRAINING PROGRAM

## 2024-01-25 PROCEDURE — 99233 SBSQ HOSP IP/OBS HIGH 50: CPT | Performed by: STUDENT IN AN ORGANIZED HEALTH CARE EDUCATION/TRAINING PROGRAM

## 2024-01-25 PROCEDURE — 87040 BLOOD CULTURE FOR BACTERIA: CPT | Performed by: INTERNAL MEDICINE

## 2024-01-25 PROCEDURE — 85027 COMPLETE CBC AUTOMATED: CPT

## 2024-01-25 PROCEDURE — 99222 1ST HOSP IP/OBS MODERATE 55: CPT | Performed by: DERMATOLOGY

## 2024-01-25 PROCEDURE — 80053 COMPREHEN METABOLIC PANEL: CPT

## 2024-01-25 PROCEDURE — C9113 INJ PANTOPRAZOLE SODIUM, VIA: HCPCS | Performed by: INTERNAL MEDICINE

## 2024-01-25 PROCEDURE — 80048 BASIC METABOLIC PNL TOTAL CA: CPT | Performed by: STUDENT IN AN ORGANIZED HEALTH CARE EDUCATION/TRAINING PROGRAM

## 2024-01-25 PROCEDURE — 84145 PROCALCITONIN (PCT): CPT | Performed by: STUDENT IN AN ORGANIZED HEALTH CARE EDUCATION/TRAINING PROGRAM

## 2024-01-25 PROCEDURE — 82948 REAGENT STRIP/BLOOD GLUCOSE: CPT

## 2024-01-25 PROCEDURE — 85610 PROTHROMBIN TIME: CPT

## 2024-01-25 PROCEDURE — 99232 SBSQ HOSP IP/OBS MODERATE 35: CPT | Performed by: INTERNAL MEDICINE

## 2024-01-25 PROCEDURE — 83605 ASSAY OF LACTIC ACID: CPT

## 2024-01-25 RX ORDER — HEPARIN SODIUM 1000 [USP'U]/ML
2000 INJECTION, SOLUTION INTRAVENOUS; SUBCUTANEOUS EVERY 6 HOURS PRN
Status: DISCONTINUED | OUTPATIENT
Start: 2024-01-25 | End: 2024-01-25

## 2024-01-25 RX ORDER — ACETAMINOPHEN 325 MG/1
650 TABLET ORAL EVERY 4 HOURS PRN
Status: DISCONTINUED | OUTPATIENT
Start: 2024-01-25 | End: 2024-01-29

## 2024-01-25 RX ORDER — HEPARIN SODIUM 10000 [USP'U]/100ML
3-20 INJECTION, SOLUTION INTRAVENOUS
Status: DISCONTINUED | OUTPATIENT
Start: 2024-01-25 | End: 2024-01-25

## 2024-01-25 RX ORDER — PANTOPRAZOLE SODIUM 40 MG/1
40 TABLET, DELAYED RELEASE ORAL
Status: DISCONTINUED | OUTPATIENT
Start: 2024-01-26 | End: 2024-02-10 | Stop reason: HOSPADM

## 2024-01-25 RX ORDER — HEPARIN SODIUM 1000 [USP'U]/ML
4000 INJECTION, SOLUTION INTRAVENOUS; SUBCUTANEOUS EVERY 6 HOURS PRN
Status: DISCONTINUED | OUTPATIENT
Start: 2024-01-25 | End: 2024-01-25

## 2024-01-25 RX ADMIN — CEFAZOLIN SODIUM 2000 MG: 2 SOLUTION INTRAVENOUS at 05:19

## 2024-01-25 RX ADMIN — Medication: at 08:34

## 2024-01-25 RX ADMIN — APIXABAN 5 MG: 5 TABLET, FILM COATED ORAL at 21:06

## 2024-01-25 RX ADMIN — Medication: at 17:16

## 2024-01-25 RX ADMIN — Medication 1000 UNITS: at 08:31

## 2024-01-25 RX ADMIN — CEFAZOLIN SODIUM 2000 MG: 2 SOLUTION INTRAVENOUS at 21:06

## 2024-01-25 RX ADMIN — CHLORHEXIDINE GLUCONATE 15 ML: 1.2 SOLUTION ORAL at 21:07

## 2024-01-25 RX ADMIN — CEFAZOLIN SODIUM 2000 MG: 2 SOLUTION INTRAVENOUS at 17:16

## 2024-01-25 RX ADMIN — Medication: at 21:07

## 2024-01-25 RX ADMIN — CHLORHEXIDINE GLUCONATE 15 ML: 1.2 SOLUTION ORAL at 08:31

## 2024-01-25 RX ADMIN — APIXABAN 5 MG: 5 TABLET, FILM COATED ORAL at 08:31

## 2024-01-25 RX ADMIN — HYDROCORTISONE SODIUM SUCCINATE 50 MG: 100 INJECTION, POWDER, FOR SOLUTION INTRAMUSCULAR; INTRAVENOUS at 01:21

## 2024-01-25 RX ADMIN — HYDROCORTISONE SODIUM SUCCINATE 50 MG: 100 INJECTION, POWDER, FOR SOLUTION INTRAMUSCULAR; INTRAVENOUS at 08:33

## 2024-01-25 RX ADMIN — PANTOPRAZOLE SODIUM 40 MG: 40 INJECTION, POWDER, FOR SOLUTION INTRAVENOUS at 08:34

## 2024-01-25 NOTE — PROGRESS NOTES
Critical Care Interval Transfer Note:    Please refer to progress note from earlier today for full details.     73-year-old female past medical history severe plaque psoriasis coming from a nursing home originally admitted for sepsis requiring antibiotics and fluid resuscitation.  However, history of heart failure with preserved ejection fraction repeat echocardiogram yesterday showing preserved EF and normal diastolic function however fluid resuscitation was escalated slowly in light of her heart failure.  Yesterday afternoon 1/24 the patient became hypotensive and was given multiple albumin fluid boluses with failure to correct her mean arterial pressures above 65.  The hospitalist team reach out to the critical care team for evaluation for pressor support.  The patient was accepted to the critical care service and was seen overnight.  Her pressors support was de-escalated at 9 PM last evening with no further need for it today 1/25.  Blood pressures remain within normal limits and maps greater then 65 consistently throughout the morning.  Infectious disease following, Vanco and cefepime yesterday switched and de-escalated to Ancef.  She was signed out and accepted back to the hospital service as she no longer requires critical care services.      Barriers to discharge:   none     Consults: IP CONSULT TO CASE MANAGEMENT  IP CONSULT TO PHARMACY  IP CONSULT TO INFECTIOUS DISEASES  IP CONSULT TO PHARMACY  IP CONSULT TO PICC TEAM  IP CONSULT TO CASE MANAGEMENT  IP CONSULT TO DERMATOLOGY    Recommended to review admission imaging for incidental findings and document in discharge navigator: Chart reviewed, no known incidental findings noted at this time.      Discharge Plan: unknown   Corey Plan: Continue for accurate I/O monitoring for 48 hours    PT Recommendations: Post acute rehabilitation services  OT Recommendations: Post acute rehabilitation services      Patient seen and evaluated by Critical Care today and  deemed to be appropriate for transfer to Med Surg. Spoke to Dr. Kendrick from [facility] to accept transfer. Critical care can be contacted via Tiger Connect with any questions or concerns.

## 2024-01-25 NOTE — MALNUTRITION/BMI
This medical record reflects one or more clinical indicators suggestive of morbid obesity.      BMI Findings:  Adult BMI Classifications: Morbid Obesity 40-44.9        Body mass index is 44.68 kg/m².     See Nutrition note dated 1/25/24 for additional details.  Completed nutrition assessment is viewable in the nutrition documentation.   no

## 2024-01-25 NOTE — CONSULTS
Age-related nuclear cataract of left eye  Discussed with patient that cataract in her left eye is advanced enough at this time to consider surgery; it would be patient's choice. Discussed options such as surgery or change of glasses RX. Discussed surgical risks, benefits, alternatives and recovery. Patient understands that changing glasses will not significantly improve vision and elects to have surgery. As Dr. Marcelle Montes De Oca is no longer performing cataract surgery, a referral to [facility] was offered. Patient agrees to be referred, so we will send complete exam and information to them and the patient will be contacted. Information on [address] [place] [address] ophthalmology given and reviewed with the patient. Pseudophakia of right eye  No treatment. Floaters, left   There is no evidence of retinal pathology. All signs and symptoms of retinal detachment/tears explained in detail. Patient instructed to call the office if they experience increase in floaters, increase in flashes of light, loss of vision or curtain or veil effect. RPE mottling of macula  No treatment. DERMATOLOGY:  CONSULTATION   Bhavna Al 73 y.o. 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 They will consider adding linezolid if she is still acting toxemic, though she has been improving and is off pressors currently.   Will check magnesium to rule out nutritional deficiency given perioral distribution in addition to buttocks lesions, though suspect buttocks lesions are irritant contact dermatitis in setting of incontinence.   Recommend frequent moisturization of body, especially desquamating areas    #Psoriasis  History of biopsy proven pustular psoriasis, does not currently appear to be in pustular psoriasis flare. Psoriasis actually appears less active than in previous admissions.   On chronic prednisone 5 mg at baseline (unclear if for psoriasis or rheumatoid arthritis)  Plan:  Patient has been seen by dermatology numerous times in the inpatient setting. There have been numerous attempts to schedule patient for outpatient follow up for more definitive management of her  "psoriasis. We have been unable to get her to follow up in our outpatient clinic - she would clearly benefit from initiation of psoriasis biologics with IL-23 inhibitors but these cannot be initiated in the inpatient setting   Will order psoriasis biologic initiation blood work - chronic hepatitis panel and quantiferon gold - to expedite biologic initiation in outpatient setting.   Can resume home topical steroids one stable from an infectious disease standpoint.    #Pressure wounds of buttocks/posterior thighs   Management per wound care. Lesions do not appear to be psoriatic in nature. Could be having some component of worsening in setting of staph scalded skin syndrome.      At time of discharge, please facilitate urgent dermatology follow up at our [facility] by [place] Texting the consult resident or on-call dermatology provider to alert us that the patient is being discharged.   Office number: [phone]     Thank you for involving me in the care of your patient. Please call with questions, change in clinical status or if tests recommended above are abnormal.       History:     HISTORY OF PRESENT ILLNESS:    Reason for Consult: psoriasis  HPI: Bhavna Al is a 73 y.o. year old female with a past medical history of morbid obesity, chronic diastolic heart failure, HTN, rheumatoid arthritis, and psoriasis. Presented from nursing facility in septic shock with suspected skin source. Blood cultures growing staph. Patient has  numerous ulcerations of the buttocks and genital region. Clinically, she has superficial desquamation most prominent in the perioral region concerning for staph scalded skin syndrome.     Per dermatology notes during previous admission: \"Patient is known to dermatology during previous admissions and has biopsy proven pustular psoriasis. Her psoriasis has been managed well with topical corticosteroids while in the inpatient setting. Patient admits that she continues to " "apply topical steroids in the outpatient setting. Previous goals that were agreed upon by the patient and the daughter included transitioning the patient to a biologic or acitretin in the outpatient setting. Unfortunately, there has been multiple attempts to arrange for outpatient follow up upon discharge in the past but it has been extremely difficult to reach the patient or her daughter outside the hospital setting.\"        PAST MEDICAL HISTORY:  Past Medical History:   Diagnosis Date    Abnormal thyroid function test     last assessed: 2015     Arthritis     Caries     last assessed: 2016     Continuous opioid dependence (HCC) 2021    Edema of right lower extremity     last assessed: 2015     GERD (gastroesophageal reflux disease)     Hypertension     Medicare annual wellness visit, subsequent 2021    Positive blood culture 3/11/2022    Sarcoid      Past Surgical History:   Procedure Laterality Date     SECTION      MULTIPLE TOOTH EXTRACTIONS N/A 2016    Procedure: Surgical extraction of teeth 2, 18, 19, 30, 31; incision and drainage of left subperiosteal abscess ;  Surgeon: Clara Cevallos DMD;  Location: [facility];  Service:        FAMILY HISTORY:  Non-contributory    SOCIAL HISTORY:  Social History   Single  Social History     Substance and Sexual Activity   Alcohol Use Not Currently     Social History     Substance and Sexual Activity   Drug Use No     Social History     Tobacco Use   Smoking Status Never   Smokeless Tobacco Never       ALLERGIES:  Allergies   Allergen Reactions    Shellfish-Derived Products - Food Allergy Itching    Methotrexate Derivatives     Amoxicillin Rash    Ampicillin-Sulbactam Sodium Rash       MEDICATIONS:  All current active medications have been reviewed.       Physical exam:     Temp:  [97.5 °F (36.4 °C)-98.6 °F (37 °C)] 97.5 °F (36.4 °C)  HR:  [] 82  Resp:  [15-20] 16  BP: ()/() 148/59  SpO2:  [98 %-100 %] " 100 %  Temp (24hrs), Av.9 °F (36.6 °C), Min:97.5 °F (36.4 °C), Max:98.6 °F (37 °C)  Current: Temperature: 97.5 °F (36.4 °C)    PSYCH: Normal mood and affect  EYES: Normal conjunctiva  ENT: Normal lips and oral mucosa  CARDIOVASCULAR: No edema  RESPIRATORY: Normal respirations  HEME/LYMPH/IMMUNO:  No regional lymphadenopathy     FULL ORGAN SYSTEM SKIN EXAM (SKIN)            superficial desquamation with prominence in the perioral region in addition to erythema of the chest and extremities with areas of superficial desquamation     Labs, Imaging, & Other Studies     Lab Results:  I have personally reviewed pertinent labs.     Results from last 7 days   Lab Units 24  0545 24  0250 24  0549 24  0013   WBC Thousand/uL 4.71 8.09 8.62 6.99   HEMOGLOBIN g/dL 9.0* 11.7 12.6 14.2   PLATELETS Thousands/uL  --  172 204 212     Results from last 7 days   Lab Units 24  0545 24  0250 24  0549 24  0013   POTASSIUM mmol/L 3.5  4.0 4.1 4.2 4.7   CHLORIDE mmol/L 92*  103 106 104 101   CO2 mmol/L 20*  23 21 26 28   BUN mg/dL 21  25 23 19 19   CREATININE mg/dL 4.00*  2.71* 1.94* 1.75* 1.77*   EGFR ml/min/1.73sq m 10  16 24 28 27   CALCIUM mg/dL 6.5*  7.8* 8.3* 8.4 9.6   AST U/L  --  38 33 30   ALT U/L  --  19 21 24   ALK PHOS U/L  --  38 45 53     Results from last 7 days   Lab Units 24  0433 24  0013   BLOOD CULTURE   --  Staphylococcus aureus*   GRAM STAIN RESULT   --  Gram positive cocci in clusters*  Gram positive cocci in clusters*   MRSA CULTURE ONLY  No Methicillin Resistant Staphlyococcus aureus (MRSA) isolated  --        Pathology:   I have personally reviewed pertinent reports.       Nicolette Ponce MD  Dermatology, PGY-3

## 2024-01-25 NOTE — PHYSICAL THERAPY NOTE
Physical Therapy Screen    Patient's Name: Bhavna Al       01/25/24 0758   PT Last Visit   PT Visit Date 01/25/24   Note Type   Note type Screen   Additional Comments Orders received. Chart reviewed. Per EMR pt is a LTC resident at [facility] and is totally dependent for ADL's and mobility at baseline. As pt is totally dependent at her baseline, no skilled acute PT needs identified. Recommend for nsg/restorative staff to slide OOB to drop arm chair as appropriate. Will d/c from caseload.       Ruchi Gayle, PT, DPT

## 2024-01-25 NOTE — OCCUPATIONAL THERAPY NOTE
Occupational Therapy Screen     01/25/24 1513   OT Last Visit   OT Visit Date 01/25/24   Note Type   Note type Screen   Additional Comments Repeat orders placed, pt was already screened this admission will complete orders

## 2024-01-25 NOTE — PLAN OF CARE
Problem: Prexisting or High Potential for Compromised Skin Integrity  Goal: Skin integrity is maintained or improved  Description: INTERVENTIONS:  - Identify patients at risk for skin breakdown  - Assess and monitor skin integrity  - Assess and monitor nutrition and hydration status  - Monitor labs   - Assess for incontinence   - Turn and reposition patient  - Assist with mobility/ambulation  - Relieve pressure over bony prominences  - Avoid friction and shearing  - Provide appropriate hygiene as needed including keeping skin clean and dry  - Evaluate need for skin moisturizer/barrier cream  - Collaborate with interdisciplinary team   - Patient/family teaching  - Consider wound care consult   Outcome: Progressing     Problem: PAIN - ADULT  Goal: Verbalizes/displays adequate comfort level or baseline comfort level  Description: Interventions:  - Encourage patient to monitor pain and request assistance  - Assess pain using appropriate pain scale  - Administer analgesics based on type and severity of pain and evaluate response  - Implement non-pharmacological measures as appropriate and evaluate response  - Consider cultural and social influences on pain and pain management  - Notify physician/advanced practitioner if interventions unsuccessful or patient reports new pain  Outcome: Progressing     Problem: INFECTION - ADULT  Goal: Absence or prevention of progression during hospitalization  Description: INTERVENTIONS:  - Assess and monitor for signs and symptoms of infection  - Monitor lab/diagnostic results  - Monitor all insertion sites, i.e. indwelling lines, tubes, and drains  - Monitor endotracheal if appropriate and nasal secretions for changes in amount and color  - Alex appropriate cooling/warming therapies per order  - Administer medications as ordered  - Instruct and encourage patient and family to use good hand hygiene technique  - Identify and instruct in appropriate isolation precautions for  identified infection/condition  Outcome: Progressing  Goal: Absence of fever/infection during neutropenic period  Description: INTERVENTIONS:  - Monitor WBC    Outcome: Progressing     Problem: SAFETY ADULT  Goal: Patient will remain free of falls  Description: INTERVENTIONS:  - Educate patient/family on patient safety including physical limitations  - Instruct patient to call for assistance with activity   - Consult OT/PT to assist with strengthening/mobility   - Keep Call bell within reach  - Keep bed low and locked with side rails adjusted as appropriate  - Keep care items and personal belongings within reach  - Initiate and maintain comfort rounds  - Make Fall Risk Sign visible to staff  - Offer Toileting every   Hours, in advance of need  - Initiate/Maintain  alarm  - Obtain necessary fall risk management equipment:    - Apply yellow socks and bracelet for high fall risk patients  - Consider moving patient to room near nurses station  Outcome: Progressing  Goal: Maintain or return to baseline ADL function  Description: INTERVENTIONS:  -  Assess patient's ability to carry out ADLs; assess patient's baseline for ADL function and identify physical deficits which impact ability to perform ADLs (bathing, care of mouth/teeth, toileting, grooming, dressing, etc.)  - Assess/evaluate cause of self-care deficits   - Assess range of motion  - Assess patient's mobility; develop plan if impaired  - Assess patient's need for assistive devices and provide as appropriate  - Encourage maximum independence but intervene and supervise when necessary  - Involve family in performance of ADLs  - Assess for home care needs following discharge   - Consider OT consult to assist with ADL evaluation and planning for discharge  - Provide patient education as appropriate  Outcome: Progressing  Goal: Maintains/Returns to pre admission functional level  Description: INTERVENTIONS:  - Perform AM-PAC 6 Click Basic Mobility/ Daily Activity  assessment daily.  - Set and communicate daily mobility goal to care team and patient/family/caregiver.   - Collaborate with rehabilitation services on mobility goals if consulted  - Perform Range of Motion   times a day.  - Reposition patient every   hours.  - Dangle patient   times a day  - Stand patient   times a day  - Ambulate patient   times a day  - Out of bed to chair   times a day   - Out of bed for meals   times a day  - Out of bed for toileting  - Record patient progress and toleration of activity level   Outcome: Progressing     Problem: DISCHARGE PLANNING  Goal: Discharge to home or other facility with appropriate resources  Description: INTERVENTIONS:  - Identify barriers to discharge w/patient and caregiver  - Arrange for needed discharge resources and transportation as appropriate  - Identify discharge learning needs (meds, wound care, etc.)  - Arrange for interpretive services to assist at discharge as needed  - Refer to Case Management Department for coordinating discharge planning if the patient needs post-hospital services based on physician/advanced practitioner order or complex needs related to functional status, cognitive ability, or social support system  Outcome: Progressing     Problem: Knowledge Deficit  Goal: Patient/family/caregiver demonstrates understanding of disease process, treatment plan, medications, and discharge instructions  Description: Complete learning assessment and assess knowledge base.  Interventions:  - Provide teaching at level of understanding  - Provide teaching via preferred learning methods  Outcome: Progressing     Problem: CARDIOVASCULAR - ADULT  Goal: Maintains optimal cardiac output and hemodynamic stability  Description: INTERVENTIONS:  - Monitor I/O, vital signs and rhythm  - Monitor for S/S and trends of decreased cardiac output  - Administer and titrate ordered vasoactive medications to optimize hemodynamic stability  - Assess quality of pulses, skin color  and temperature  - Assess for signs of decreased coronary artery perfusion  - Instruct patient to report change in severity of symptoms  Outcome: Progressing  Goal: Absence of cardiac dysrhythmias or at baseline rhythm  Description: INTERVENTIONS:  - Continuous cardiac monitoring, vital signs, obtain 12 lead EKG if ordered  - Administer antiarrhythmic and heart rate control medications as ordered  - Monitor electrolytes and administer replacement therapy as ordered  Outcome: Progressing     Problem: RESPIRATORY - ADULT  Goal: Achieves optimal ventilation and oxygenation  Description: INTERVENTIONS:  - Assess for changes in respiratory status  - Assess for changes in mentation and behavior  - Position to facilitate oxygenation and minimize respiratory effort  - Oxygen administered by appropriate delivery if ordered  - Initiate smoking cessation education as indicated  - Encourage broncho-pulmonary hygiene including cough, deep breathe, Incentive Spirometry  - Assess the need for suctioning and aspirate as needed  - Assess and instruct to report SOB or any respiratory difficulty  - Respiratory Therapy support as indicated  Outcome: Progressing     Problem: METABOLIC, FLUID AND ELECTROLYTES - ADULT  Goal: Electrolytes maintained within normal limits  Description: INTERVENTIONS:  - Monitor labs and assess patient for signs and symptoms of electrolyte imbalances  - Administer electrolyte replacement as ordered  - Monitor response to electrolyte replacements, including repeat lab results as appropriate  - Instruct patient on fluid and nutrition as appropriate  Outcome: Progressing  Goal: Fluid balance maintained  Description: INTERVENTIONS:  - Monitor labs   - Monitor I/O and WT  - Instruct patient on fluid and nutrition as appropriate  - Assess for signs & symptoms of volume excess or deficit  Outcome: Progressing  Goal: Glucose maintained within target range  Description: INTERVENTIONS:  - Monitor Blood Glucose as  ordered  - Assess for signs and symptoms of hyperglycemia and hypoglycemia  - Administer ordered medications to maintain glucose within target range  - Assess nutritional intake and initiate nutrition service referral as needed  Outcome: Progressing     Problem: SKIN/TISSUE INTEGRITY - ADULT  Goal: Skin Integrity remains intact(Skin Breakdown Prevention)  Description: Assess:  -Perform Gagan assessment every    -Clean and moisturize skin every    -Inspect skin when repositioning, toileting, and assisting with ADLS  -Assess under medical devices such as   every    -Assess extremities for adequate circulation and sensation     Bed Management:  -Have minimal linens on bed & keep smooth, unwrinkled  -Change linens as needed when moist or perspiring  -Avoid sitting or lying in one position for more than   hours while in bed  -Keep HOB at  degrees     Toileting:  -Offer bedside commode  -Assess for incontinence every    -Use incontinent care products after each incontinent episode such as      Activity:  -Mobilize patient   times a day  -Encourage activity and walks on unit  -Encourage or provide ROM exercises   -Turn and reposition patient every   Hours  -Use appropriate equipment to lift or move patient in bed  -Instruct/ Assist with weight shifting every   when out of bed in chair  -Consider limitation of chair time   hour intervals    Skin Care:  -Avoid use of baby powder, tape, friction and shearing, hot water or constrictive clothing  -Relieve pressure over bony prominences using    -Do not massage red bony areas    Next Steps:  -Teach patient strategies to minimize risks such as     -Consider consults to  interdisciplinary teams such as    Outcome: Progressing  Goal: Incision(s), wounds(s) or drain site(s) healing without S/S of infection  Description: INTERVENTIONS  - Assess and document dressing, incision, wound bed, drain sites and surrounding tissue  - Provide patient and family education  - Perform skin  care/dressing changes every    Outcome: Progressing  Goal: Pressure injury heals and does not worsen  Description: Interventions:  - Implement low air loss mattress or specialty surface (Criteria met)  - Apply silicone foam dressing  - Instruct/assist with weight shifting every   minutes when in chair   - Limit chair time to   hour intervals  - Use special pressure reducing interventions such as   when in chair   - Apply fecal or urinary incontinence containment device   - Perform passive or active ROM every    - Turn and reposition patient & offload bony prominences every   hours   - Utilize friction reducing device or surface for transfers   - Consider consults to  interdisciplinary teams such as    - Use incontinent care products after each incontinent episode such as    - Consider nutrition services referral as needed  Outcome: Progressing

## 2024-01-25 NOTE — SPEECH THERAPY NOTE
"Speech Language/Pathology    Speech/Language Pathology Progress Note    Patient Name: Bhavna Al  Today's Date: 2024     Problem List  Principal Problem:    Severe sepsis (HCC)  Active Problems:    Rheumatoid arthritis (HCC)    Benign essential hypertension    Vitamin D deficiency    Cellulitis    Paroxysmal atrial fibrillation (HCC)    Chronic diastolic heart failure (HCC)    Ambulatory dysfunction    Abnormal urinalysis    Dermatitis associated with incontinence    Chronic kidney disease       Past Medical History  Past Medical History:   Diagnosis Date    Abnormal thyroid function test     last assessed: 2015     Arthritis     Caries     last assessed: 2016     Continuous opioid dependence (HCC) 2021    Edema of right lower extremity     last assessed: 2015     GERD (gastroesophageal reflux disease)     Hypertension     Medicare annual wellness visit, subsequent 2021    Positive blood culture 3/11/2022    Sarcoid         Past Surgical History  Past Surgical History:   Procedure Laterality Date     SECTION      MULTIPLE TOOTH EXTRACTIONS N/A 2016    Procedure: Surgical extraction of teeth 2, 18, 19, 30, 31; incision and drainage of left subperiosteal abscess ;  Surgeon: Clara Cevallos DMD;  Location: [facility];  Service:      Subjective:  Patient is asleep. Mildly alert upon entry.     Objective:  Patient is seen for dysphagia therapy. She is lethargic and laying in bed. Her eyes remain closed but produced some vocalizations. Oral care is given and patient tolerates. Patient is assessed with puree solids (pudding), thin liquids and nectar thick liquids. Frequent cueing is needed for retrieval of materials. Oral closure is reduced, with puree solids being \"scraped\" in oral cavity. Transfer of bolus for pudding and thin liquids is prolonged. Swallow initiation appears delayed. No overt s/s aspiration observed with thin liquids, but patient is at " increased risk due to lethargy. Moisturizer is given after PO trials.     Assessment:  Patient tolerated oral care and PO trials with frequent cuing.     Plan/Recommendations:  Upgrade diet to full liquid diet of nectar thick liquids, as tolerated. Continue ST.

## 2024-01-25 NOTE — PROGRESS NOTES
Progress Note - Infectious Disease   Bhavna Al 73 y.o. female MRN: [id number]  Unit/Bed#: [facility] 202-01 Encounter: [id number]      Impression/Plan:  1.  Sepsis.  Appears to be secondary to Staphylococcus aureus and Staphylococcus lugdunensis bacteremia.  The patient does have significant rash with areas of potential cutaneous breakdown that could lead to a transient bacteremia.  Consideration for the possibility of staph scalded skin syndrome.  The patient remains hemodynamically stable despite her systemic illness.  Procalcitonin level has been quite high.  -Antibiotics as below  -Follow-up blood cultures  -Recheck CBC with differential and CMP to make sure no developing antibiotic associated toxicities or worsening infection  -Additional workup as below  -Consult dermatology for possible biopsy     2.  MSSA and Staphylococcus lugdunensis bacteremia.  Suspected cutaneous translocation in the setting of advanced psoriasis.  Consideration for the possibility of endovascular infection.  Consideration for the possibility of staph scalded skin syndrome.  Poor windows on transthoracic echocardiogram but no valvular vegetation reported.  -Continue cefazolin  -Recheck blood cultures x 2 sets to confirm clearance of the bacteremia  -Additional workup as needed     3.  Acute encephalopathy.  In the setting of sepsis from bacteremia.  No headache or stiff neck to suggest a primary CNS infection.  Suspect multifactorial in this patient with baseline cognitive issues.  Clinically improved.  -Monitor cognition  -Antibiotics as above for now  -Additional workup as needed     4.  Psoriasis.  Without any recent directed therapy.  Could be the nidus for translocation of bacteria into the bloodstream.  With worsening rash, consideration for the possibility of staph scalded skin syndrome or another etiology.  -Treatment as per the primary  -Consult dermatology for management of psoriasis and possible biopsy     5.  Atrial fibrillation.   With a rapid ventricular response.  On rate control and anticoagulation     6.  Acute kidney injury.  Complicating chronic kidney disease.  Stage III.  Suspect prerenal issues playing a significant role.  Consideration for the possibility of sepsis playing a role.  -Recheck BMP  -Dose adjusted antibiotics  -Volume management     7.  Rheumatoid arthritis.  On low-dose prednisone and hydroxychloroquine  -Low threshold for stress dose steroids    Discussed with the critical care service the plan to continue cefazolin for now.  They agree with the plan.    Antibiotics:  Cefazolin 2  Antibiotics 3    Subjective:  Events since yesterday noted.  Patient became more hypotensive and was transferred to the ICU.  She briefly required vasopressor support but after receiving some IV fluid resuscitation pressors have been discontinued.  Patient has no fever, chills, sweats; no nausea, vomiting, diarrhea; no cough, shortness of breath; diffuse pain. No new symptoms.    Objective:  Vitals:  Temp:  [97.5 °F (36.4 °C)-98.6 °F (37 °C)] 97.5 °F (36.4 °C)  HR:  [] 82  Resp:  [15-20] 16  BP: ()/() 148/59  SpO2:  [98 %-100 %] 100 %  Temp (24hrs), Av.9 °F (36.6 °C), Min:97.5 °F (36.4 °C), Max:98.6 °F (37 °C)  Current: Temperature: 97.5 °F (36.4 °C)    Physical Exam:   General Appearance:  Somnolent, arousable, answers simple questions by shaking her head and moaning   Throat: Oropharynx moist without lesions.    Lungs:   Clear to auscultation bilaterally; no wheezes, rhonchi or rales; respirations unlabored   Heart:  RRR; no murmur, rub or gallop   Abdomen:   Soft, non-tender, non-distended, positive bowel sounds.     Extremities: No clubbing, cyanosis or edema   Skin: Diffuse erythematous rash.       Labs, Imaging, & Other studies:   All pertinent labs and imaging studies were personally reviewed  Results from last 7 days   Lab Units 24  0545 24  0250 24  0549 24  0013   WBC Thousand/uL 4.71  8.09 8.62 6.99   HEMOGLOBIN g/dL 9.0* 11.7 12.6 14.2   PLATELETS Thousands/uL  --  172 204 212     Results from last 7 days   Lab Units 01/25/24  0545 01/24/24  0250 01/23/24  0549 01/23/24  0013   SODIUM mmol/L 138  141 142 143 140   POTASSIUM mmol/L 3.5  4.0 4.1 4.2 4.7   CHLORIDE mmol/L 92*  103 106 104 101   CO2 mmol/L 20*  23 21 26 28   BUN mg/dL 21  25 23 19 19   CREATININE mg/dL 4.00*  2.71* 1.94* 1.75* 1.77*   EGFR ml/min/1.73sq m 10  16 24 28 27   CALCIUM mg/dL 6.5*  7.8* 8.3* 8.4 9.6   AST U/L  --  38 33 30   ALT U/L  --  19 21 24   ALK PHOS U/L  --  38 45 53     Results from last 7 days   Lab Units 01/23/24  0433 01/23/24  0013   BLOOD CULTURE   --  Staphylococcus aureus*   GRAM STAIN RESULT   --  Gram positive cocci in clusters*  Gram positive cocci in clusters*   MRSA CULTURE ONLY  No Methicillin Resistant Staphlyococcus aureus (MRSA) isolated  --      Results from last 7 days   Lab Units 01/24/24  0250 01/23/24  0013   PROCALCITONIN ng/ml 3.29* 0.17

## 2024-01-25 NOTE — PROGRESS NOTES
[facility]  Progress Note: Critical Care  Name: Bhavna Al 73 y.o. female I MRN: [id number]  Unit/Bed#: [facility] 202-01 I Date of Admission: 1/22/2024   Date of Service: 1/25/2024 I Hospital Day: 2    Assessment/Plan     Neuro:   Diagnosis: Encephalopathic   In the setting of hypotension and septic shock  Plan:   Patient is able to follow commands however does not verbally respond   NPO for now given mental status   CAM ICU   Delirium precautions   Ng tube placed but removed by patient overnight. Did receive evening dose of eliquis.      CV:   Diagnosis: Septic Shock  Source - suspected skin source in the setting of diffuse plaque psorasis and dermatitis   1/23 Blood Cx: MSSA and Staph Lugdenesis   No drainable abscess   Plan:   ID following - on cefazolin 2g Q8H   Repeat blood cultures until clear   TTE ordered>>> normal LVEF 65% and normal diastolic dysfucntion  Stress dose steriods - hydrocortisone 50 Q6H   Elliott gtt goal MAP >65   1/25 off pressor support since yesterday evening MAPs consistently above 80  Diagnosis: Afib w RVR  Home meds: lopressor 25 BID, eliquis 5 BID    Plan:   Holding home lopressor 2/2 hypotension/shock   Insert NGT for eliquis administration>>> pt self removed last evening  If not able to achieve enteral access continue AC w heparin gtt until patient able to tolerate po eliquis  Consider amio if HR >120s   Diagnosis: HFpEF  Hx of HFpEF, last TTE 2020 EF wnl, G2DD  On lasix 40 qd PTA   Repeat TTE EF 65% normal diastolic dysfunction   Plan:   Monitor fluid status   Holding diuresis in the setting of shock   Follow Cr. Continues to uptrend      Pulm:  No acute issues     GI:   Diagnosis: GERD  Plan:   Continue IV PPI until able to take po    Diagnosis: Constipation   Plan:   Bowel regimen in place      :   Diagnosis: AMANDA on CKD3  Baseline Cr 1.5-1.6  Plan:   Elevated Cr on admission, today 2.7. significant uptrend  Suspect pre-renal etiology given  hypotension   Monitor BMP   Avoid relative hypotension   Avoid nephrotoxic agents   Continue fluid resus and abx.   Urine output minimal. Continue to monitor     F/E/N:  Fluids: None  Electrolytes: Replete PRN  Nutrition: NPO - speech following      Heme/Onc:  No active issues   On Eliquis for Afib,. Did receive last evenings dose however pulled out NG tube. If unable to tolerate PO may need to switch to heparin drip.      Endo:   No active issues      ID:   Diagnosis: Septic shock, gram positive bacteremia, cellulitis superimposed on dermatitis   Plan:   A/P Above   Corey placed to avoid further skin infection as from incontinence  Wound care consult       MSK/Skin:   Diagnosis: Rheumatoid Arthritis   Non ambulatory at baseline   On plaquenil 200 BID   Plan:   Holding plaquenil in the setting of septic shock   Patient does have evidence of possible acute flare w swelling in her RUE   Diagnosis: Pustular Psoriasis   Biopsy-proven pustular psoriasis   On chronic prednisone 5 BID  Plan:   Continue home meds - topical hydrocortisone, clobetasol, eucerin  dose steroids - hydrocortisone 50 Q6H      Disposition: Stepdown Level 1    ICU Core Measures     A: Assess, Prevent, and Manage Pain Has pain been assessed? Yes  Need for changes to pain regimen? No   B: Both SAT/SAT  N/A   C: Choice of Sedation RASS Goal: -1 Drowsy or 0 Alert and Calm  Need for changes to sedation or analgesia regimen? No   D: Delirium CAM-ICU: Negative   E: Early Mobility  Plan for early mobility? Yes   F: Family Engagement Plan for family engagement today? Yes       Antibiotic Review: Patient on appropriate coverage based on culture data.     Review of Invasive Devices:    Corey Plan: Continue for accurate I/O monitoring for 48 hours        Prophylaxis:  VTE VTE covered by:  apixaban, Oral, 5 mg at 01/24/24 1801       Stress Ulcer  covered byomeprazole (PriLOSEC) 20 mg delayed release capsule [[id number]] (Long-Term Med), pantoprazole (PROTONIX)  injection 40 mg [[id number]]        Significant 24hr Events     24hr events: pt off pressor support since 9pm last evening. Doing well. Cr uptrending. Minimal urine output. MAPS maintained. Pulled out NG tube last evening.      Subjective     Review of Systems   Reason unable to perform ROS: pt sleeping. encephalopathic.        Objective                            Vitals I/O      Most Recent Min/Max in 24hrs   Temp 97.5 °F (36.4 °C) Temp  Min: 97.5 °F (36.4 °C)  Max: 98.6 °F (37 °C)   Pulse 82 Pulse  Min: 82  Max: 133   Resp 16 Resp  Min: 15  Max: 20   /59 BP  Min: 69/45  Max: 157/60   O2 Sat 100 % SpO2  Min: 98 %  Max: 100 %      Intake/Output Summary (Last 24 hours) at 1/25/2024 0642  Last data filed at 1/25/2024 0601  Gross per 24 hour   Intake 6179.75 ml   Output 410 ml   Net 5769.75 ml       Diet NPO    Invasive Monitoring           Physical Exam   Physical Exam  Skin:     General: Skin is warm and dry.      Findings: Rash present.      Comments: Multiple psoriatic lesions noted   Cardiovascular:      Rate and Rhythm: Normal rate. Rhythm irregular.      Pulses: Normal pulses.   Musculoskeletal:      Right lower leg: Trace Edema present.      Left lower leg: Trace Edema present.   Abdominal:      Palpations: Abdomen is soft.   Constitutional:       General: She is not in acute distress.     Appearance: She is ill-appearing and toxic-appearing.      Interventions: She is not sedated, not intubated and not restrained.  Pulmonary:      Effort: Pulmonary effort is normal. No respiratory distress. She is not intubated.      Breath sounds: Normal breath sounds. No stridor. No wheezing, rhonchi or rales.   Neurological:      Comments: Encephalopathic.    Genitourinary/Anorectal:  Corey present.          Diagnostic Studies      EKG: reviewed  Imaging:  I have personally reviewed pertinent reports.       Medications:  Scheduled PRN   apixaban, 5 mg, BID  cefazolin, 2,000 mg, Q8H  chlorhexidine, 15 mL, Q12H  FAMILIA  cholecalciferol, 1,000 Units, Daily  hydrocortisone sodium succinate, 50 mg, Q6H FAMILIA  pantoprazole, 40 mg, Q24H FAMILIA  white petrolatum-mineral oil, , TID      acetaminophen, 650 mg, Q4H PRN  albuterol, 2 puff, Q6H PRN  docusate sodium, 100 mg, BID PRN  metoprolol, 5 mg, Q6H PRN  ondansetron, 4 mg, Q6H PRN       Continuous    phenylephine,  mcg/min, Last Rate: Stopped (01/24/24 2153)         Labs:    CBC    Recent Labs     01/24/24  0250   WBC 8.09   HGB 11.7   HCT 38.3        BMP    Recent Labs     01/24/24  0250 01/25/24  0545   SODIUM 142 141   K 4.1 4.0    103   CO2 21 23   AGAP 15 15   BUN 23 25   CREATININE 1.94* 2.71*   CALCIUM 8.3* 7.8*       Coags    No recent results     Additional Electrolytes  Recent Labs     01/24/24  0250   MG 2.3   PHOS 3.4          Blood Gas    No recent results  No recent results LFTs  Recent Labs     01/24/24  0250   ALT 19   AST 38   ALKPHOS 38   ALB 2.7*   TBILI 0.84       Infectious  Recent Labs     01/24/24  0250   PROCALCITONI 3.29*     Glucose  Recent Labs     01/24/24  0250 01/25/24  0545   GLUC 68 152*                   Aeljandro Ambrose DO

## 2024-01-26 PROBLEM — E87.0 HYPERNATREMIA: Status: ACTIVE | Noted: 2024-01-26

## 2024-01-26 PROBLEM — R65.21 SEPTIC SHOCK (HCC): Status: ACTIVE | Noted: 2022-01-10

## 2024-01-26 LAB
ALBUMIN SERPL BCP-MCNC: 3.3 G/DL (ref 3.5–5)
ALP SERPL-CCNC: 37 U/L (ref 34–104)
ALT SERPL W P-5'-P-CCNC: 10 U/L (ref 7–52)
ANION GAP SERPL CALCULATED.3IONS-SCNC: 13 MMOL/L
ANION GAP SERPL CALCULATED.3IONS-SCNC: 14 MMOL/L
AST SERPL W P-5'-P-CCNC: 32 U/L (ref 13–39)
BACTERIA BLD CULT: ABNORMAL
BASOPHILS # BLD AUTO: 0.01 THOUSANDS/ÂΜL (ref 0–0.1)
BASOPHILS NFR BLD AUTO: 0 % (ref 0–1)
BILIRUB SERPL-MCNC: 0.36 MG/DL (ref 0.2–1)
BUN SERPL-MCNC: 30 MG/DL (ref 5–25)
BUN SERPL-MCNC: 30 MG/DL (ref 5–25)
CALCIUM ALBUM COR SERPL-MCNC: 9.7 MG/DL (ref 8.3–10.1)
CALCIUM SERPL-MCNC: 8.9 MG/DL (ref 8.4–10.2)
CALCIUM SERPL-MCNC: 9.1 MG/DL (ref 8.4–10.2)
CHLORIDE SERPL-SCNC: 109 MMOL/L (ref 96–108)
CHLORIDE SERPL-SCNC: 110 MMOL/L (ref 96–108)
CO2 SERPL-SCNC: 23 MMOL/L (ref 21–32)
CO2 SERPL-SCNC: 25 MMOL/L (ref 21–32)
CREAT SERPL-MCNC: 2.02 MG/DL (ref 0.6–1.3)
CREAT SERPL-MCNC: 2.02 MG/DL (ref 0.6–1.3)
EOSINOPHIL # BLD AUTO: 0.13 THOUSAND/ÂΜL (ref 0–0.61)
EOSINOPHIL NFR BLD AUTO: 2 % (ref 0–6)
ERYTHROCYTE [DISTWIDTH] IN BLOOD BY AUTOMATED COUNT: 14.6 % (ref 11.6–15.1)
GFR SERPL CREATININE-BSD FRML MDRD: 23 ML/MIN/1.73SQ M
GFR SERPL CREATININE-BSD FRML MDRD: 23 ML/MIN/1.73SQ M
GLUCOSE SERPL-MCNC: 119 MG/DL (ref 65–140)
GLUCOSE SERPL-MCNC: 122 MG/DL (ref 65–140)
GLUCOSE SERPL-MCNC: 90 MG/DL (ref 65–140)
GLUCOSE SERPL-MCNC: 90 MG/DL (ref 65–140)
GLUCOSE SERPL-MCNC: 91 MG/DL (ref 65–140)
GRAM STN SPEC: ABNORMAL
HBV CORE AB SER QL: NORMAL
HBV CORE IGM SER QL: NORMAL
HBV SURFACE AG SER QL: NORMAL
HCT VFR BLD AUTO: 34.1 % (ref 34.8–46.1)
HCV AB SER QL: NORMAL
HGB BLD-MCNC: 10.3 G/DL (ref 11.5–15.4)
IMM GRANULOCYTES # BLD AUTO: 0.03 THOUSAND/UL (ref 0–0.2)
IMM GRANULOCYTES NFR BLD AUTO: 1 % (ref 0–2)
LYMPHOCYTES # BLD AUTO: 0.51 THOUSANDS/ÂΜL (ref 0.6–4.47)
LYMPHOCYTES NFR BLD AUTO: 8 % (ref 14–44)
MAGNESIUM SERPL-MCNC: 2.8 MG/DL (ref 1.9–2.7)
MCH RBC QN AUTO: 27.7 PG (ref 26.8–34.3)
MCHC RBC AUTO-ENTMCNC: 30.2 G/DL (ref 31.4–37.4)
MCV RBC AUTO: 92 FL (ref 82–98)
MONOCYTES # BLD AUTO: 0.36 THOUSAND/ÂΜL (ref 0.17–1.22)
MONOCYTES NFR BLD AUTO: 6 % (ref 4–12)
NEUTROPHILS # BLD AUTO: 5.41 THOUSANDS/ÂΜL (ref 1.85–7.62)
NEUTS SEG NFR BLD AUTO: 83 % (ref 43–75)
NRBC BLD AUTO-RTO: 0 /100 WBCS
PHOSPHATE SERPL-MCNC: 2.7 MG/DL (ref 2.3–4.1)
PLATELET # BLD AUTO: 121 THOUSANDS/UL (ref 149–390)
PMV BLD AUTO: 12.2 FL (ref 8.9–12.7)
POTASSIUM SERPL-SCNC: 3.3 MMOL/L (ref 3.5–5.3)
POTASSIUM SERPL-SCNC: 3.5 MMOL/L (ref 3.5–5.3)
PROT SERPL-MCNC: 6.1 G/DL (ref 6.4–8.4)
RBC # BLD AUTO: 3.72 MILLION/UL (ref 3.81–5.12)
SODIUM SERPL-SCNC: 146 MMOL/L (ref 135–147)
SODIUM SERPL-SCNC: 148 MMOL/L (ref 135–147)
WBC # BLD AUTO: 6.45 THOUSAND/UL (ref 4.31–10.16)

## 2024-01-26 PROCEDURE — 99232 SBSQ HOSP IP/OBS MODERATE 35: CPT | Performed by: INTERNAL MEDICINE

## 2024-01-26 PROCEDURE — 83735 ASSAY OF MAGNESIUM: CPT

## 2024-01-26 PROCEDURE — 87340 HEPATITIS B SURFACE AG IA: CPT

## 2024-01-26 PROCEDURE — 82948 REAGENT STRIP/BLOOD GLUCOSE: CPT

## 2024-01-26 PROCEDURE — 86803 HEPATITIS C AB TEST: CPT

## 2024-01-26 PROCEDURE — 84100 ASSAY OF PHOSPHORUS: CPT

## 2024-01-26 PROCEDURE — 80048 BASIC METABOLIC PNL TOTAL CA: CPT | Performed by: STUDENT IN AN ORGANIZED HEALTH CARE EDUCATION/TRAINING PROGRAM

## 2024-01-26 PROCEDURE — 85025 COMPLETE CBC W/AUTO DIFF WBC: CPT | Performed by: STUDENT IN AN ORGANIZED HEALTH CARE EDUCATION/TRAINING PROGRAM

## 2024-01-26 PROCEDURE — 99233 SBSQ HOSP IP/OBS HIGH 50: CPT | Performed by: INTERNAL MEDICINE

## 2024-01-26 PROCEDURE — 86480 TB TEST CELL IMMUN MEASURE: CPT

## 2024-01-26 PROCEDURE — 80053 COMPREHEN METABOLIC PANEL: CPT

## 2024-01-26 PROCEDURE — 86705 HEP B CORE ANTIBODY IGM: CPT

## 2024-01-26 PROCEDURE — 86704 HEP B CORE ANTIBODY TOTAL: CPT

## 2024-01-26 PROCEDURE — 92526 ORAL FUNCTION THERAPY: CPT

## 2024-01-26 RX ORDER — DEXTROSE MONOHYDRATE 50 MG/ML
75 INJECTION, SOLUTION INTRAVENOUS CONTINUOUS
Status: DISPENSED | OUTPATIENT
Start: 2024-01-26 | End: 2024-01-26

## 2024-01-26 RX ORDER — PREDNISONE 5 MG/1
5 TABLET ORAL 2 TIMES DAILY WITH MEALS
Status: DISCONTINUED | OUTPATIENT
Start: 2024-01-26 | End: 2024-01-29

## 2024-01-26 RX ORDER — POTASSIUM CHLORIDE 14.9 MG/ML
20 INJECTION INTRAVENOUS
Status: COMPLETED | OUTPATIENT
Start: 2024-01-26 | End: 2024-01-26

## 2024-01-26 RX ADMIN — Medication: at 10:09

## 2024-01-26 RX ADMIN — CHLORHEXIDINE GLUCONATE 15 ML: 1.2 SOLUTION ORAL at 22:17

## 2024-01-26 RX ADMIN — APIXABAN 5 MG: 5 TABLET, FILM COATED ORAL at 09:58

## 2024-01-26 RX ADMIN — Medication: at 17:08

## 2024-01-26 RX ADMIN — DEXTROSE 75 ML/HR: 5 SOLUTION INTRAVENOUS at 13:29

## 2024-01-26 RX ADMIN — APIXABAN 5 MG: 5 TABLET, FILM COATED ORAL at 17:07

## 2024-01-26 RX ADMIN — CEFAZOLIN SODIUM 2000 MG: 2 SOLUTION INTRAVENOUS at 13:31

## 2024-01-26 RX ADMIN — POTASSIUM CHLORIDE 20 MEQ: 14.9 INJECTION, SOLUTION INTRAVENOUS at 09:58

## 2024-01-26 RX ADMIN — Medication 1000 UNITS: at 09:58

## 2024-01-26 RX ADMIN — CEFAZOLIN SODIUM 2000 MG: 2 SOLUTION INTRAVENOUS at 22:18

## 2024-01-26 RX ADMIN — PREDNISONE 5 MG: 5 TABLET ORAL at 17:07

## 2024-01-26 RX ADMIN — PANTOPRAZOLE SODIUM 40 MG: 40 TABLET, DELAYED RELEASE ORAL at 05:23

## 2024-01-26 RX ADMIN — Medication 12.5 MG: at 22:17

## 2024-01-26 RX ADMIN — CEFAZOLIN SODIUM 2000 MG: 2 SOLUTION INTRAVENOUS at 05:23

## 2024-01-26 RX ADMIN — Medication: at 22:18

## 2024-01-26 RX ADMIN — CHLORHEXIDINE GLUCONATE 15 ML: 1.2 SOLUTION ORAL at 09:58

## 2024-01-26 RX ADMIN — POTASSIUM CHLORIDE 20 MEQ: 14.9 INJECTION, SOLUTION INTRAVENOUS at 12:44

## 2024-01-26 NOTE — ASSESSMENT & PLAN NOTE
Known history of pustular psoriasis proven by biopsy  Derm consulted while inpatient; appreciate recommendations  Will continue home prednisone 5 mg twice daily  Outpatient planning per [facility]

## 2024-01-26 NOTE — PLAN OF CARE
Problem: PAIN - ADULT  Goal: Verbalizes/displays adequate comfort level or baseline comfort level  Description: Interventions:  - Encourage patient to monitor pain and request assistance  - Assess pain using appropriate pain scale  - Administer analgesics based on type and severity of pain and evaluate response  - Implement non-pharmacological measures as appropriate and evaluate response  - Consider cultural and social influences on pain and pain management  - Notify physician/advanced practitioner if interventions unsuccessful or patient reports new pain  Outcome: Progressing     Problem: INFECTION - ADULT  Goal: Absence or prevention of progression during hospitalization  Description: INTERVENTIONS:  - Assess and monitor for signs and symptoms of infection  - Monitor lab/diagnostic results  - Monitor all insertion sites, i.e. indwelling lines, tubes, and drains  - Monitor endotracheal if appropriate and nasal secretions for changes in amount and color  - [place] appropriate cooling/warming therapies per order  - Administer medications as ordered  - Instruct and encourage patient and family to use good hand hygiene technique  - Identify and instruct in appropriate isolation precautions for identified infection/condition  Outcome: Progressing

## 2024-01-26 NOTE — PROGRESS NOTES
[facility]  Progress Note  Name: Bhavna Al I  MRN: [id number]  Unit/Bed#: [facility] 918-01 I Date of Admission: 1/22/2024   Date of Service: 1/26/2024 I Hospital Day: 3    Assessment/Plan   * Septic shock (HCC)  Assessment & Plan  Septic shock secondary to staph bacteremia, cellulitis.  Transiently required vasopressor support in the ICU and stress dose steroids    Continue on IV cefazolin per ID recommendations  Monitor WBC count and fever curve  Monitor wounds clinically with local wound care  Follow-up repeat blood cultures  Resume home prednisone dose 5 mg twice daily    Pustular psoriasis  Assessment & Plan  Known history of pustular psoriasis proven by biopsy  Derm consulted while inpatient; appreciate recommendations  Will continue home prednisone 5 mg twice daily  Outpatient planning per [facility]    Cellulitis  Assessment & Plan  Cellulitis superimposed on pustular psoriasis  Continue IV cefazolin as above  Follow-up ID recommendations  Monitor WBC count and fever curve    Hypernatremia  Assessment & Plan  Secondary to decreased p.o. intake from dysphagia and modified diet  Diet has now been advanced to puréed/thin liquids which should help  In the meantime we will place on D5 infusion    Chronic kidney disease  Assessment & Plan  Creatinine peaked at 2.7 on 1/25; this is prerenal in the setting of septic shock  Responded to IV fluid hydration  Continue IVF; recheck BMP in the morning    Dermatitis associated with incontinence  Assessment & Plan  Patient with dermatitis.  Continue Eucerin.  In order to avoid further skin infection possibly portal of entry being sites of skin breakdown, will put Corey catheter.    Abnormal urinalysis  Assessment & Plan  Urinalysis shows more of RBCs than WBCs with no bacteria.  Still qualified as abnormal, the patient does not have overt findings of urinary tract infection (pyuria etc.)  Patient has chronic dermatitis and currently  has stage I sacral decubitus ulceration.  Questionable presence of cellulitis as source of infection.    Discontinue cefepime and vanc  See sepsis section.    Ambulatory dysfunction  Assessment & Plan  Resides in SNF for rehabilitation therapy  Appreciate case management consult    Chronic diastolic heart failure (HCC)  Assessment & Plan  Not in exacerbation  Home Lasix on hold given volume issues  Currently receiving IVF given hyponatremia and poor p.o. intake; when oral intake has improved can restart home diuretics    Paroxysmal atrial fibrillation (HCC)  Assessment & Plan  Currently rate controlled  Will restart home metoprolol at 12.5 mg twice daily  Continue home Eliquis 5 mg twice daily for stroke prophylaxis    Vitamin D deficiency  Assessment & Plan  Continue cholecalciferol.    Benign essential hypertension  Assessment & Plan  Monitor on low-dose metoprolol    Rheumatoid arthritis (HCC)  Assessment & Plan  Continue prednisone 5 mg twice daily.  Hydroxychloroquine on hold in the setting of severe sepsis        VTE Pharmacologic Prophylaxis:   High Risk (Score >/= 5) - Pharmacological DVT Prophylaxis Ordered: apixaban (Eliquis). Sequential Compression Devices Ordered.    Mobility:   Basic Mobility Inpatient Raw Score: 6  -HLM Goal: 2: Bed activities/Dependent transfer  JH-HLM Achieved: 2: Bed activities/Dependent transfer  HLM Goal achieved. Continue to encourage appropriate mobility.    Patient Centered Rounds: I performed bedside rounds with nursing staff today.   Discussions with Specialists or Other Care Team Provider: SHARRI. Speech Pathology. ID.    Education and Discussions with Family / Patient: Updated  (daughter) at bedside.    Total Time Spent on Date of Encounter in care of patient: 55 mins. This time was spent on one or more of the following: performing physical exam; counseling and coordination of care; obtaining or reviewing history; documenting in the medical record;  reviewing/ordering tests, medications or procedures; communicating with other healthcare professionals and discussing with patient's family/caregivers.    Current Length of Stay: 3 day(s)  Current Patient Status: Inpatient   Certification Statement: The patient will continue to require additional inpatient hospital stay due to IV antibiotics, dispo planning, serial lab monitoring, medication titration, clinical monitoring  Discharge Plan: Anticipate discharge in >72 hrs to rehab facility.    Code Status: Level 1 - Full Code    Subjective:   Patient seen and examined.  Quite lethargic and encephalopathic this morning.  I reevaluated her in the afternoon with her daughter at bedside.  She was much more alert and interactive.  Still confused and oriented to self and place only.  Afebrile.  Blood pressure has been stable.    Objective:     Vitals:   Temp (24hrs), Av °F (36.7 °C), Min:97.8 °F (36.6 °C), Max:98.3 °F (36.8 °C)    Temp:  [97.8 °F (36.6 °C)-98.3 °F (36.8 °C)] 97.8 °F (36.6 °C)  HR:  [] 116  Resp:  [18-26] 18  BP: (112-145)/(60-74) 133/73  SpO2:  [95 %-99 %] 95 %  Body mass index is 44.68 kg/m².     Input and Output Summary (last 24 hours):     Intake/Output Summary (Last 24 hours) at 2024 1658  Last data filed at 2024 1311  Gross per 24 hour   Intake 100 ml   Output 650 ml   Net -550 ml   PHYSICAL EXAM:    Vitals signs reviewed  Constitutional Lethargic but arousable.  NAD.   Head/Neck   Normocephalic. Atraumatic.   HEENT   No scleral icterus. EOMI.   Heart   Regular rate and rhythm. No murmers.   Lungs   Clear to auscultation bilaterally. Respirations unlaboured.   Abdomen   Soft. Nontender. Nondistended.    Skin Psoriatic rash scattered throughout the body.  Flaking skin evident.   Extremities   No deformities. No peripheral edema.  Legs are bandaged, clean and dry.   Neuro   Alert and oriented. No new deficits.   Psych   Mood stable. Affect normal.         Additional Data:      Labs:  Results from last 7 days   Lab Units 01/26/24  0517   WBC Thousand/uL 6.45   HEMOGLOBIN g/dL 10.3*   HEMATOCRIT % 34.1*   PLATELETS Thousands/uL 121*   NEUTROS PCT % 83*   LYMPHS PCT % 8*   MONOS PCT % 6   EOS PCT % 2     Results from last 7 days   Lab Units 01/26/24  0517   SODIUM mmol/L 148*  146   POTASSIUM mmol/L 3.3*  3.5   CHLORIDE mmol/L 110*  109*   CO2 mmol/L 25  23   BUN mg/dL 30*  30*   CREATININE mg/dL 2.02*  2.02*   ANION GAP mmol/L 13  14   CALCIUM mg/dL 9.1  8.9   ALBUMIN g/dL 3.3*   TOTAL BILIRUBIN mg/dL 0.36   ALK PHOS U/L 37   ALT U/L 10   AST U/L 32   GLUCOSE RANDOM mg/dL 91  90     Results from last 7 days   Lab Units 01/25/24 2002   INR  2.02*     Results from last 7 days   Lab Units 01/26/24  1637 01/26/24  1130 01/25/24  2004 01/25/24  1822 01/25/24  1308 01/25/24  0712 01/25/24  0507 01/25/24  0005 01/24/24  2140   POC GLUCOSE mg/dl 119 90 117 137 92 88 93 123 85         Results from last 7 days   Lab Units 01/25/24 2002 01/25/24  0815 01/24/24  1216 01/24/24  0828 01/24/24  0250 01/23/24  0232 01/23/24  0013   LACTIC ACID mmol/L 1.5  --  2.4* 2.7*  --  2.3* 2.3*   PROCALCITONIN ng/ml  --  10.15*  --   --  3.29*  --  0.17       Lines/Drains:  Invasive Devices       Peripheral Intravenous Line  Duration             Peripheral IV 01/23/24 Right;Ventral (anterior) Forearm 3 days    Peripheral IV 01/24/24 Dorsal (posterior);Left Hand 2 days              Drain  Duration             Urethral Catheter 3 days                  Urinary Catheter:  Goal for removal: Voiding trial when ambulation improves                    Imaging: Reviewed radiology reports from this admission including: chest CT scan and abdominal/pelvic CT    Recent Cultures (last 7 days):   Results from last 7 days   Lab Units 01/25/24  0535 01/23/24  0013   BLOOD CULTURE  No Growth at 24 hrs.  No Growth at 24 hrs. Staphylococcus aureus*  Staphylococcus capitis*   GRAM STAIN RESULT   --  Gram positive cocci in  clusters*  Gram positive cocci in clusters*       Last 24 Hours Medication List:   Current Facility-Administered Medications   Medication Dose Route Frequency Provider Last Rate    acetaminophen  650 mg Oral Q4H PRN AZIZA Wolf      albuterol  2 puff Inhalation Q6H PRN Pepe Do MD      apixaban  5 mg Oral BID AZIZA Wolf      cefazolin  2,000 mg Intravenous Q8H AZIZA Wolf 2,000 mg (01/26/24 1331)    chlorhexidine  15 mL Mouth/Throat Q12H FAMILIA AZIZA Wolf      cholecalciferol  1,000 Units Oral Daily Pepe Do MD      dextrose  75 mL/hr Intravenous Continuous Issac Minor DO 75 mL/hr (01/26/24 1329)    docusate sodium  100 mg Oral BID PRN Pepe Do MD      metoprolol  5 mg Intravenous Q6H PRN AZIZA Wolf      metoprolol tartrate  12.5 mg Oral Q12H [facility] Issac Minor DO      ondansetron  4 mg Intravenous Q6H PRN Pepe Do MD      pantoprazole  40 mg Oral Early Morning AZIZA Wolf      predniSONE  5 mg Oral BID With Meals Issac Minor DO      white petrolatum-mineral oil   Topical TID Pepe Do MD          Today, Patient Was Seen By: Issac Minor DO    **Please Note: This note may have been constructed using a voice recognition system.**

## 2024-01-26 NOTE — SPEECH THERAPY NOTE
"Speech Language/Pathology    Speech/Language Pathology Progress Note    Patient Name: Bhavna Al  Today's Date: 2024     Problem List  Principal Problem:    Severe sepsis (HCC)  Active Problems:    Rheumatoid arthritis (HCC)    Benign essential hypertension    Vitamin D deficiency    Cellulitis    Paroxysmal atrial fibrillation (HCC)    Chronic diastolic heart failure (HCC)    Ambulatory dysfunction    Abnormal urinalysis    Dermatitis associated with incontinence    Chronic kidney disease       Past Medical History  Past Medical History:   Diagnosis Date    Abnormal thyroid function test     last assessed: 2015     Arthritis     Caries     last assessed: 2016     Continuous opioid dependence (HCC) 2021    Edema of right lower extremity     last assessed: 2015     GERD (gastroesophageal reflux disease)     Hypertension     Medicare annual wellness visit, subsequent 2021    Positive blood culture 3/11/2022    Sarcoid         Past Surgical History  Past Surgical History:   Procedure Laterality Date     SECTION      MULTIPLE TOOTH EXTRACTIONS N/A 2016    Procedure: Surgical extraction of teeth 2, 18, 19, 30, 31; incision and drainage of left subperiosteal abscess ;  Surgeon: Clara Cevallos DMD;  Location: [facility];  Service:      Subjective:  \"I just don't feel like chewing\" Patient awake and alert.     Objective:  Patient is seen for dysphagia therapy. She is more alert than previous sessions and is able to participate in conversation. Patient is assessed with thin liquids and puree solids (ice cream). She is able to tolerate all trials well. Retrieval via tsp and straw are adequate. Delayed initiation of swallow with prolonged bolus hold in oral cavity for puree solids. No overt s/s of aspiration observed. Patient offered upgraded trials, but refused at this time.     Assessment:  Patient tolerated puree solids and thin liquids well. "     Plan/Recommendations:  Upgrade diet to puree and thin. Continue ST.

## 2024-01-26 NOTE — PROGRESS NOTES
Progress Note - Infectious Disease   Bhavna Al 73 y.o. female MRN: [id number]  Unit/Bed#: [facility] 918-01 Encounter: [id number]      Impression/Plan:  1.  Sepsis.  Appears to be secondary to Staphylococcus aureus and Staphylococcus lugdunensis bacteremia.  The patient does have significant rash with areas of potential cutaneous breakdown that could lead to a transient bacteremia.  Consideration for the possibility of staph scalded skin syndrome.  The patient remains hemodynamically stable despite her systemic illness.  Procalcitonin level has been quite high.  -Antibiotics as below  -Follow-up blood cultures  -Recheck CBC with differential and CMP to make sure no developing antibiotic associated toxicities or worsening infection  -Additional workup as below  -Consult dermatology for possible biopsy     2.  MSSA and Staphylococcus lugdunensis bacteremia.  Suspected cutaneous translocation in the setting of advanced psoriasis.  Consideration for the possibility of endovascular infection.  Consideration for the possibility of staph scalded skin syndrome.  Poor windows on transthoracic echocardiogram but no valvular vegetation reported.  -Continue cefazolin  -Follow-up repeat blood cultures  -Additional workup as needed  -No PICC line until blood cultures are negative x 72 hours    3.  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  The patient has desquamating but the lesions appear to be drying and crusting.  As patient is improved and no longer requiring pressor support, will not add agents such as linezolid to suppress toxin production.  -Antibiotics as above  -Dermatology follow-up  -Serial exams     4.  Acute encephalopathy.  In the setting of sepsis from bacteremia.  No headache or stiff neck to suggest a primary CNS infection.  Suspect multifactorial in this patient with baseline cognitive issues.  Clinically improved.  -Monitor cognition  -Antibiotics as above for now  -Additional workup as needed     5.   Psoriasis.  Not as active as in the past.  Has been evaluated by dermatology  -Outpatient dermatology follow-up     6.  Atrial fibrillation.  With a rapid ventricular response.  On rate control and anticoagulation     7.  Acute kidney injury.  Complicating chronic kidney disease.  Stage III.  Suspect prerenal issues playing a significant role.  Consideration for the possibility of sepsis playing a role.  Renal function is improving.  -Recheck BMP  -Dose adjusted antibiotics  -Volume management     8.  Rheumatoid arthritis.  On low-dose prednisone and hydroxychloroquine  -Low threshold for stress dose steroids    Discussed the plan with the primary to continue the intravenous cefazolin while awaiting clearance of the bacteremia.  They agree with the plan.    Will see the patient again 2024.  Please [place] text me over the weekend if questions    Antibiotics:  Cefazolin 3  Antibiotics 4    Subjective:  Patient has no fever, chills, sweats; no nausea, vomiting, diarrhea; no cough, shortness of breath; no pain. No new symptoms.  She is now moved out of the intensive care unit and remained stable    Objective:  Vitals:  Temp:  [97.8 °F (36.6 °C)-98.7 °F (37.1 °C)] 97.8 °F (36.6 °C)  HR:  [] 92  Resp:  [18-26] 18  BP: (112-150)/(52-74) 133/70  SpO2:  [98 %-100 %] 99 %  Temp (24hrs), Av.2 °F (36.8 °C), Min:97.8 °F (36.6 °C), Max:98.7 °F (37.1 °C)  Current: Temperature: 97.8 °F (36.6 °C)    Physical Exam:   General Appearance:  Alert, interactive, nontoxic, no acute distress.  Answer simple questions by shaking her head   Throat: Oropharynx moist without lesions.    Lungs:   Clear to auscultation bilaterally; no wheezes, rhonchi or rales; respirations unlabored   Heart:  RRR; no murmur, rub or gallop   Abdomen:   Soft, non-tender, non-distended, positive bowel sounds.     Extremities: No clubbing, cyanosis or edema   Skin: No new rashes or lesions. No draining wounds noted.       Labs, Imaging, & Other  studies:   All pertinent labs and imaging studies were personally reviewed  Results from last 7 days   Lab Units 01/26/24  0517 01/25/24  2002 01/25/24  0545 01/24/24  0250   WBC Thousand/uL 6.45 6.00 4.71 8.09   HEMOGLOBIN g/dL 10.3* 10.1* 9.0* 11.7   PLATELETS Thousands/uL 121* 122*  --  172     Results from last 7 days   Lab Units 01/26/24  0517 01/25/24  0815 01/25/24  0545 01/24/24  0250   SODIUM mmol/L 148*  146 145 138  141 142   POTASSIUM mmol/L 3.3*  3.5 4.4 3.5  4.0 4.1   CHLORIDE mmol/L 110*  109* 108 92*  103 106   CO2 mmol/L 25  23 23 20*  23 21   BUN mg/dL 30*  30* 28* 21  25 23   CREATININE mg/dL 2.02*  2.02* 2.14* 4.00*  2.71* 1.94*   EGFR ml/min/1.73sq m 23  23 22 10  16 24   CALCIUM mg/dL 9.1  8.9 8.4 6.5*  7.8* 8.3*   AST U/L 32 41*  --  38   ALT U/L 10 19  --  19   ALK PHOS U/L 37 33*  --  38     Results from last 7 days   Lab Units 01/25/24  0535 01/23/24  0433 01/23/24  0013   BLOOD CULTURE  No Growth at 24 hrs.  No Growth at 24 hrs.  --  Staphylococcus capitis*  Staphylococcus aureus*   GRAM STAIN RESULT   --   --  Gram positive cocci in clusters*  Gram positive cocci in clusters*   MRSA CULTURE ONLY   --  No Methicillin Resistant Staphlyococcus aureus (MRSA) isolated  --      Results from last 7 days   Lab Units 01/25/24  0815 01/24/24  0250 01/23/24  0013   PROCALCITONIN ng/ml 10.15* 3.29* 0.17

## 2024-01-26 NOTE — PLAN OF CARE
Problem: PAIN - ADULT  Goal: Verbalizes/displays adequate comfort level or baseline comfort level  Description: Interventions:  - Encourage patient to monitor pain and request assistance  - Assess pain using appropriate pain scale  - Administer analgesics based on type and severity of pain and evaluate response  - Implement non-pharmacological measures as appropriate and evaluate response  - Consider cultural and social influences on pain and pain management  - Notify physician/advanced practitioner if interventions unsuccessful or patient reports new pain  Outcome: Progressing     Problem: INFECTION - ADULT  Goal: Absence or prevention of progression during hospitalization  Description: INTERVENTIONS:  - Assess and monitor for signs and symptoms of infection  - Monitor lab/diagnostic results  - Monitor all insertion sites, i.e. indwelling lines, tubes, and drains  - Monitor endotracheal if appropriate and nasal secretions for changes in amount and color  - [place] appropriate cooling/warming therapies per order  - Administer medications as ordered  - Instruct and encourage patient and family to use good hand hygiene technique  - Identify and instruct in appropriate isolation precautions for identified infection/condition  Outcome: Progressing     Problem: Prexisting or High Potential for Compromised Skin Integrity  Goal: Skin integrity is maintained or improved  Description: INTERVENTIONS:  - Identify patients at risk for skin breakdown  - Assess and monitor skin integrity  - Assess and monitor nutrition and hydration status  - Monitor labs   - Assess for incontinence   - Turn and reposition patient  - Assist with mobility/ambulation  - Relieve pressure over bony prominences  - Avoid friction and shearing  - Provide appropriate hygiene as needed including keeping skin clean and dry  - Evaluate need for skin moisturizer/barrier cream  - Collaborate with interdisciplinary team   - Patient/family teaching  -  Consider wound care consult   Outcome: Progressing     Problem: CARDIOVASCULAR - ADULT  Goal: Maintains optimal cardiac output and hemodynamic stability  Description: INTERVENTIONS:  - Monitor I/O, vital signs and rhythm  - Monitor for S/S and trends of decreased cardiac output  - Administer and titrate ordered vasoactive medications to optimize hemodynamic stability  - Assess quality of pulses, skin color and temperature  - Assess for signs of decreased coronary artery perfusion  - Instruct patient to report change in severity of symptoms  Outcome: Progressing     Problem: SAFETY ADULT  Goal: Maintain or return to baseline ADL function  Description: INTERVENTIONS:  -  Assess patient's ability to carry out ADLs; assess patient's baseline for ADL function and identify physical deficits which impact ability to perform ADLs (bathing, care of mouth/teeth, toileting, grooming, dressing, etc.)  - Assess/evaluate cause of self-care deficits   - Assess range of motion  - Assess patient's mobility; develop plan if impaired  - Assess patient's need for assistive devices and provide as appropriate  - Encourage maximum independence but intervene and supervise when necessary  - Involve family in performance of ADLs  - Assess for home care needs following discharge   - Consider OT consult to assist with ADL evaluation and planning for discharge  - Provide patient education as appropriate  Outcome: Progressing

## 2024-01-26 NOTE — ASSESSMENT & PLAN NOTE
Secondary to decreased p.o. intake from dysphagia and modified diet  Diet has now been advanced to puréed/thin liquids which should help  In the meantime we will place on D5 infusion

## 2024-01-27 ENCOUNTER — APPOINTMENT (INPATIENT)
Dept: RADIOLOGY | Facility: HOSPITAL | Age: 74
DRG: 871 | End: 2024-01-27
Payer: COMMERCIAL

## 2024-01-27 PROBLEM — L00 STAPHYLOCOCCAL SCALDED SKIN SYNDROME: Status: ACTIVE | Noted: 2017-10-27

## 2024-01-27 PROBLEM — G93.40 ENCEPHALOPATHY: Status: ACTIVE | Noted: 2024-01-27

## 2024-01-27 LAB
ANION GAP SERPL CALCULATED.3IONS-SCNC: 7 MMOL/L
ANION GAP SERPL CALCULATED.3IONS-SCNC: 8 MMOL/L
BASOPHILS # BLD AUTO: 0.01 THOUSANDS/ÂΜL (ref 0–0.1)
BASOPHILS NFR BLD AUTO: 0 % (ref 0–1)
BUN SERPL-MCNC: 27 MG/DL (ref 5–25)
BUN SERPL-MCNC: 27 MG/DL (ref 5–25)
CALCIUM SERPL-MCNC: 9.2 MG/DL (ref 8.4–10.2)
CALCIUM SERPL-MCNC: 9.3 MG/DL (ref 8.4–10.2)
CHLORIDE SERPL-SCNC: 106 MMOL/L (ref 96–108)
CHLORIDE SERPL-SCNC: 107 MMOL/L (ref 96–108)
CO2 SERPL-SCNC: 28 MMOL/L (ref 21–32)
CO2 SERPL-SCNC: 28 MMOL/L (ref 21–32)
CREAT SERPL-MCNC: 1.66 MG/DL (ref 0.6–1.3)
CREAT SERPL-MCNC: 1.68 MG/DL (ref 0.6–1.3)
EOSINOPHIL # BLD AUTO: 0.18 THOUSAND/ÂΜL (ref 0–0.61)
EOSINOPHIL NFR BLD AUTO: 3 % (ref 0–6)
ERYTHROCYTE [DISTWIDTH] IN BLOOD BY AUTOMATED COUNT: 14.6 % (ref 11.6–15.1)
GAMMA INTERFERON BACKGROUND BLD IA-ACNC: <0 IU/ML
GFR SERPL CREATININE-BSD FRML MDRD: 29 ML/MIN/1.73SQ M
GFR SERPL CREATININE-BSD FRML MDRD: 29 ML/MIN/1.73SQ M
GLUCOSE SERPL-MCNC: 107 MG/DL (ref 65–140)
GLUCOSE SERPL-MCNC: 107 MG/DL (ref 65–140)
GLUCOSE SERPL-MCNC: 86 MG/DL (ref 65–140)
GLUCOSE SERPL-MCNC: 90 MG/DL (ref 65–140)
GLUCOSE SERPL-MCNC: 92 MG/DL (ref 65–140)
GLUCOSE SERPL-MCNC: 95 MG/DL (ref 65–140)
HCT VFR BLD AUTO: 34.8 % (ref 34.8–46.1)
HGB BLD-MCNC: 10.4 G/DL (ref 11.5–15.4)
IMM GRANULOCYTES # BLD AUTO: 0.04 THOUSAND/UL (ref 0–0.2)
IMM GRANULOCYTES NFR BLD AUTO: 1 % (ref 0–2)
LYMPHOCYTES # BLD AUTO: 0.51 THOUSANDS/ÂΜL (ref 0.6–4.47)
LYMPHOCYTES NFR BLD AUTO: 9 % (ref 14–44)
M TB IFN-G BLD-IMP: NEGATIVE
M TB IFN-G CD4+ BCKGRND COR BLD-ACNC: 0 IU/ML
M TB IFN-G CD4+ BCKGRND COR BLD-ACNC: 0 IU/ML
MAGNESIUM SERPL-MCNC: 2.7 MG/DL (ref 1.9–2.7)
MCH RBC QN AUTO: 27.1 PG (ref 26.8–34.3)
MCHC RBC AUTO-ENTMCNC: 29.9 G/DL (ref 31.4–37.4)
MCV RBC AUTO: 91 FL (ref 82–98)
MITOGEN IGNF BCKGRD COR BLD-ACNC: 5.99 IU/ML
MONOCYTES # BLD AUTO: 0.37 THOUSAND/ÂΜL (ref 0.17–1.22)
MONOCYTES NFR BLD AUTO: 7 % (ref 4–12)
NEUTROPHILS # BLD AUTO: 4.32 THOUSANDS/ÂΜL (ref 1.85–7.62)
NEUTS SEG NFR BLD AUTO: 80 % (ref 43–75)
NRBC BLD AUTO-RTO: 0 /100 WBCS
PLATELET # BLD AUTO: 122 THOUSANDS/UL (ref 149–390)
PMV BLD AUTO: 12.6 FL (ref 8.9–12.7)
POTASSIUM SERPL-SCNC: 3.3 MMOL/L (ref 3.5–5.3)
POTASSIUM SERPL-SCNC: 3.3 MMOL/L (ref 3.5–5.3)
RBC # BLD AUTO: 3.84 MILLION/UL (ref 3.81–5.12)
SODIUM SERPL-SCNC: 142 MMOL/L (ref 135–147)
SODIUM SERPL-SCNC: 142 MMOL/L (ref 135–147)
WBC # BLD AUTO: 5.43 THOUSAND/UL (ref 4.31–10.16)

## 2024-01-27 PROCEDURE — 82948 REAGENT STRIP/BLOOD GLUCOSE: CPT

## 2024-01-27 PROCEDURE — 80048 BASIC METABOLIC PNL TOTAL CA: CPT | Performed by: STUDENT IN AN ORGANIZED HEALTH CARE EDUCATION/TRAINING PROGRAM

## 2024-01-27 PROCEDURE — 83735 ASSAY OF MAGNESIUM: CPT | Performed by: INTERNAL MEDICINE

## 2024-01-27 PROCEDURE — 85025 COMPLETE CBC W/AUTO DIFF WBC: CPT | Performed by: STUDENT IN AN ORGANIZED HEALTH CARE EDUCATION/TRAINING PROGRAM

## 2024-01-27 PROCEDURE — 99232 SBSQ HOSP IP/OBS MODERATE 35: CPT | Performed by: FAMILY MEDICINE

## 2024-01-27 PROCEDURE — 80048 BASIC METABOLIC PNL TOTAL CA: CPT | Performed by: INTERNAL MEDICINE

## 2024-01-27 RX ORDER — SENNOSIDES 8.6 MG
2 TABLET ORAL
Status: DISCONTINUED | OUTPATIENT
Start: 2024-01-27 | End: 2024-01-31

## 2024-01-27 RX ORDER — POLYETHYLENE GLYCOL 3350 17 G/17G
17 POWDER, FOR SOLUTION ORAL DAILY
Status: DISCONTINUED | OUTPATIENT
Start: 2024-01-28 | End: 2024-01-31

## 2024-01-27 RX ADMIN — Medication 12.5 MG: at 09:54

## 2024-01-27 RX ADMIN — Medication 12.5 MG: at 22:10

## 2024-01-27 RX ADMIN — CEFAZOLIN SODIUM 2000 MG: 2 SOLUTION INTRAVENOUS at 05:26

## 2024-01-27 RX ADMIN — CHLORHEXIDINE GLUCONATE 15 ML: 1.2 SOLUTION ORAL at 22:10

## 2024-01-27 RX ADMIN — Medication 1000 UNITS: at 09:54

## 2024-01-27 RX ADMIN — Medication: at 18:34

## 2024-01-27 RX ADMIN — SENNOSIDES 17.2 MG: 8.6 TABLET, FILM COATED ORAL at 22:10

## 2024-01-27 RX ADMIN — APIXABAN 5 MG: 5 TABLET, FILM COATED ORAL at 09:54

## 2024-01-27 RX ADMIN — ACETAMINOPHEN 650 MG: 325 TABLET, FILM COATED ORAL at 05:26

## 2024-01-27 RX ADMIN — CEFAZOLIN SODIUM 2000 MG: 2 SOLUTION INTRAVENOUS at 22:10

## 2024-01-27 RX ADMIN — Medication: at 09:52

## 2024-01-27 RX ADMIN — Medication: at 22:10

## 2024-01-27 RX ADMIN — CHLORHEXIDINE GLUCONATE 15 ML: 1.2 SOLUTION ORAL at 09:55

## 2024-01-27 RX ADMIN — CEFAZOLIN SODIUM 2000 MG: 2 SOLUTION INTRAVENOUS at 14:37

## 2024-01-27 RX ADMIN — PREDNISONE 5 MG: 5 TABLET ORAL at 18:34

## 2024-01-27 RX ADMIN — PREDNISONE 5 MG: 5 TABLET ORAL at 09:54

## 2024-01-27 NOTE — PLAN OF CARE
Problem: Prexisting or High Potential for Compromised Skin Integrity  Goal: Skin integrity is maintained or improved  Description: INTERVENTIONS:  - Identify patients at risk for skin breakdown  - Assess and monitor skin integrity  - Assess and monitor nutrition and hydration status  - Monitor labs   - Assess for incontinence   - Turn and reposition patient  - Assist with mobility/ambulation  - Relieve pressure over bony prominences  - Avoid friction and shearing  - Provide appropriate hygiene as needed including keeping skin clean and dry  - Evaluate need for skin moisturizer/barrier cream  - Collaborate with interdisciplinary team   - Patient/family teaching  - Consider wound care consult   Outcome: Progressing     Problem: PAIN - ADULT  Goal: Verbalizes/displays adequate comfort level or baseline comfort level  Description: Interventions:  - Encourage patient to monitor pain and request assistance  - Assess pain using appropriate pain scale  - Administer analgesics based on type and severity of pain and evaluate response  - Implement non-pharmacological measures as appropriate and evaluate response  - Consider cultural and social influences on pain and pain management  - Notify physician/advanced practitioner if interventions unsuccessful or patient reports new pain  Outcome: Progressing     Problem: INFECTION - ADULT  Goal: Absence or prevention of progression during hospitalization  Description: INTERVENTIONS:  - Assess and monitor for signs and symptoms of infection  - Monitor lab/diagnostic results  - Monitor all insertion sites, i.e. indwelling lines, tubes, and drains  - Monitor endotracheal if appropriate and nasal secretions for changes in amount and color  - [place] appropriate cooling/warming therapies per order  - Administer medications as ordered  - Instruct and encourage patient and family to use good hand hygiene technique  - Identify and instruct in appropriate isolation precautions for  identified infection/condition  Outcome: Progressing  Goal: Absence of fever/infection during neutropenic period  Description: INTERVENTIONS:  - Monitor WBC    Outcome: Progressing     Problem: SAFETY ADULT  Goal: Patient will remain free of falls  Description: INTERVENTIONS:  - Educate patient/family on patient safety including physical limitations  - Instruct patient to call for assistance with activity   - Consult OT/PT to assist with strengthening/mobility   - Keep Call bell within reach  - Keep bed low and locked with side rails adjusted as appropriate  - Keep care items and personal belongings within reach  - Initiate and maintain comfort rounds  - Make Fall Risk Sign visible to staff  - Offer Toileting every 2 Hours, in advance of need  - Initiate/Maintain 2alarm  - Obtain necessary fall risk management equipment: 2  - Apply yellow socks and bracelet for high fall risk patients  - Consider moving patient to room near nurses station  Outcome: Progressing  Goal: Maintain or return to baseline ADL function  Description: INTERVENTIONS:  -  Assess patient's ability to carry out ADLs; assess patient's baseline for ADL function and identify physical deficits which impact ability to perform ADLs (bathing, care of mouth/teeth, toileting, grooming, dressing, etc.)  - Assess/evaluate cause of self-care deficits   - Assess range of motion  - Assess patient's mobility; develop plan if impaired  - Assess patient's need for assistive devices and provide as appropriate  - Encourage maximum independence but intervene and supervise when necessary  - Involve family in performance of ADLs  - Assess for home care needs following discharge   - Consider OT consult to assist with ADL evaluation and planning for discharge  - Provide patient education as appropriate  Outcome: Progressing  Goal: Maintains/Returns to pre admission functional level  Description: INTERVENTIONS:  - Perform AM-PAC 6 Click Basic Mobility/ Daily Activity  assessment daily.  - Set and communicate daily mobility goal to care team and patient/family/caregiver.   - Collaborate with rehabilitation services on mobility goals if consulted  - Perform Range of Motion 2 times a day.  - Reposition patient every 2 hours.  - Dangle patient 2 times a day  - Stand patient 2 times a day  - Ambulate patient 2 times a day  - Out of bed to chair 2 times a day   - Out of bed for meals 2 times a day  - Out of bed for toileting  - Record patient progress and toleration of activity level   Outcome: Progressing     Problem: DISCHARGE PLANNING  Goal: Discharge to home or other facility with appropriate resources  Description: INTERVENTIONS:  - Identify barriers to discharge w/patient and caregiver  - Arrange for needed discharge resources and transportation as appropriate  - Identify discharge learning needs (meds, wound care, etc.)  - Arrange for interpretive services to assist at discharge as needed  - Refer to Case Management Department for coordinating discharge planning if the patient needs post-hospital services based on physician/advanced practitioner order or complex needs related to functional status, cognitive ability, or social support system  Outcome: Progressing     Problem: Knowledge Deficit  Goal: Patient/family/caregiver demonstrates understanding of disease process, treatment plan, medications, and discharge instructions  Description: Complete learning assessment and assess knowledge base.  Interventions:  - Provide teaching at level of understanding  - Provide teaching via preferred learning methods  Outcome: Progressing     Problem: CARDIOVASCULAR - ADULT  Goal: Maintains optimal cardiac output and hemodynamic stability  Description: INTERVENTIONS:  - Monitor I/O, vital signs and rhythm  - Monitor for S/S and trends of decreased cardiac output  - Administer and titrate ordered vasoactive medications to optimize hemodynamic stability  - Assess quality of pulses, skin color  and temperature  - Assess for signs of decreased coronary artery perfusion  - Instruct patient to report change in severity of symptoms  Outcome: Progressing  Goal: Absence of cardiac dysrhythmias or at baseline rhythm  Description: INTERVENTIONS:  - Continuous cardiac monitoring, vital signs, obtain 12 lead EKG if ordered  - Administer antiarrhythmic and heart rate control medications as ordered  - Monitor electrolytes and administer replacement therapy as ordered  Outcome: Progressing     Problem: METABOLIC, FLUID AND ELECTROLYTES - ADULT  Goal: Electrolytes maintained within normal limits  Description: INTERVENTIONS:  - Monitor labs and assess patient for signs and symptoms of electrolyte imbalances  - Administer electrolyte replacement as ordered  - Monitor response to electrolyte replacements, including repeat lab results as appropriate  - Instruct patient on fluid and nutrition as appropriate  Outcome: Progressing  Goal: Fluid balance maintained  Description: INTERVENTIONS:  - Monitor labs   - Monitor I/O and WT  - Instruct patient on fluid and nutrition as appropriate  - Assess for signs & symptoms of volume excess or deficit  Outcome: Progressing  Goal: Glucose maintained within target range  Description: INTERVENTIONS:  - Monitor Blood Glucose as ordered  - Assess for signs and symptoms of hyperglycemia and hypoglycemia  - Administer ordered medications to maintain glucose within target range  - Assess nutritional intake and initiate nutrition service referral as needed  Outcome: Progressing     Problem: SKIN/TISSUE INTEGRITY - ADULT  Goal: Skin Integrity remains intact(Skin Breakdown Prevention)  Description: Assess:  -Perform [place] assessment every 2  -Clean and moisturize skin every 2  -Inspect skin when repositioning, toileting, and assisting with ADLS  -Assess under medical devices such as 2 every 2  -Assess extremities for adequate circulation and sensation     Bed Management:  -Have minimal linens  on bed & keep smooth, unwrinkled  -Change linens as needed when moist or perspiring  -Avoid sitting or lying in one position for more than 2 hours while in bed  -Keep HOB at 2degrees     Toileting:  -Offer bedside commode  -Assess for incontinence every 2  -Use incontinent care products after each incontinent episode such as 2    Activity:  -Mobilize patient 2 times a day  -Encourage activity and walks on unit  -Encourage or provide ROM exercises   -Turn and reposition patient every 2 Hours  -Use appropriate equipment to lift or move patient in bed  -Instruct/ Assist with weight shifting every 2 when out of bed in chair  -Consider limitation of chair time 2 hour intervals    Skin Care:  -Avoid use of baby powder, tape, friction and shearing, hot water or constrictive clothing  -Relieve pressure over bony prominences using 2  -Do not massage red bony areas    Next Steps:  -Teach patient strategies to minimize risks such as 2   -Consider consults to  interdisciplinary teams such as 2  Outcome: Progressing  Goal: Incision(s), wounds(s) or drain site(s) healing without S/S of infection  Description: INTERVENTIONS  - Assess and document dressing, incision, wound bed, drain sites and surrounding tissue  - Provide patient and family education  - Perform skin care/dressing changes every 2  Outcome: Progressing  Goal: Pressure injury heals and does not worsen  Description: Interventions:  - Implement low air loss mattress or specialty surface (Criteria met)  - Apply silicone foam dressing  - Instruct/assist with weight shifting every 2 minutes when in chair   - Limit chair time to 2 hour intervals  - Use special pressure reducing interventions such as 2 when in chair   - Apply fecal or urinary incontinence containment device   - Perform passive or active ROM every 2  - Turn and reposition patient & offload bony prominences every 2 hours   - Utilize friction reducing device or surface for transfers   - Consider consults to   interdisciplinary teams such as 2  - Use incontinent care products after each incontinent episode such as     - Consider nutrition services referral as needed  Outcome: Progressing     Problem: Nutrition/Hydration-ADULT  Goal: Nutrient/Hydration intake appropriate for improving, restoring or maintaining nutritional needs  Description: Monitor and assess patient's nutrition/hydration status for malnutrition. Collaborate with interdisciplinary team and initiate plan and interventions as ordered.  Monitor patient's weight and dietary intake as ordered or per policy. Utilize nutrition screening tool and intervene as necessary. Determine patient's food preferences and provide high-protein, high-caloric foods as appropriate.     INTERVENTIONS:  - Monitor oral intake, urinary output, labs, and treatment plans  - Assess nutrition and hydration status and recommend course of action  - Evaluate amount of meals eaten  - Assist patient with eating if necessary   - Allow adequate time for meals  - Recommend/ encourage appropriate diets, oral nutritional supplements, and vitamin/mineral supplements  - Order, calculate, and assess calorie counts as needed  - Recommend, monitor, and adjust tube feedings and TPN/PPN based on assessed needs  - Assess need for intravenous fluids  - Provide specific nutrition/hydration education as appropriate  - Include patient/family/caregiver in decisions related to nutrition  Outcome: Progressing

## 2024-01-27 NOTE — PLAN OF CARE
Problem: Prexisting or High Potential for Compromised Skin Integrity  Goal: Skin integrity is maintained or improved  Description: INTERVENTIONS:  - Identify patients at risk for skin breakdown  - Assess and monitor skin integrity  - Assess and monitor nutrition and hydration status  - Monitor labs   - Assess for incontinence   - Turn and reposition patient  - Assist with mobility/ambulation  - Relieve pressure over bony prominences  - Avoid friction and shearing  - Provide appropriate hygiene as needed including keeping skin clean and dry  - Evaluate need for skin moisturizer/barrier cream  - Collaborate with interdisciplinary team   - Patient/family teaching  - Consider wound care consult   Outcome: Progressing     Problem: PAIN - ADULT  Goal: Verbalizes/displays adequate comfort level or baseline comfort level  Description: Interventions:  - Encourage patient to monitor pain and request assistance  - Assess pain using appropriate pain scale  - Administer analgesics based on type and severity of pain and evaluate response  - Implement non-pharmacological measures as appropriate and evaluate response  - Consider cultural and social influences on pain and pain management  - Notify physician/advanced practitioner if interventions unsuccessful or patient reports new pain  Outcome: Progressing     Problem: INFECTION - ADULT  Goal: Absence or prevention of progression during hospitalization  Description: INTERVENTIONS:  - Assess and monitor for signs and symptoms of infection  - Monitor lab/diagnostic results  - Monitor all insertion sites, i.e. indwelling lines, tubes, and drains  - Monitor endotracheal if appropriate and nasal secretions for changes in amount and color  - [place] appropriate cooling/warming therapies per order  - Administer medications as ordered  - Instruct and encourage patient and family to use good hand hygiene technique  - Identify and instruct in appropriate isolation precautions for  identified infection/condition  Outcome: Progressing

## 2024-01-27 NOTE — PROGRESS NOTES
[facility]  Progress Note  Name: Bhavna Al I  MRN: [id number]  Unit/Bed#: [facility] 918-01 I Date of Admission: 1/22/2024   Date of Service: 1/27/2024 I Hospital Day: 4    Assessment/Plan   * Septic shock (HCC)  Assessment & Plan  Septic shock secondary to staph bacteremia, cellulitis.  Transiently required vasopressor support in the ICU and stress dose steroids  Continue on IV cefazolin per ID recommendations  Monitor WBC count and fever curve  Monitor wounds clinically with local wound care  Follow-up repeat blood cultures  Resume home prednisone dose 5 mg twice daily    Encephalopathy  Assessment & Plan  Likely toxic metabolic encephalopathy from the infectious process.  Nonfocal exam    Hypernatremia  Assessment & Plan  Secondary to decreased p.o. intake from dysphagia and modified diet  Diet has now been advanced to puréed/thin liquids which should help  In the meantime we will place on D5 infusion    Chronic kidney disease  Assessment & Plan  Creatinine peaked at 2.7 on 1/25; this is prerenal in the setting of septic shock  Responded to IV fluid hydration  Continue IVF; recheck BMP in the morning    Dermatitis associated with incontinence  Assessment & Plan  Patient with dermatitis.  Continue Eucerin.  In order to avoid further skin infection possibly portal of entry being sites of skin breakdown, will put Corey catheter.    Ambulatory dysfunction  Assessment & Plan  Resides in SNF for rehabilitation therapy  Appreciate case management consult    Chronic diastolic heart failure (HCC)  Assessment & Plan  Not in exacerbation  Home Lasix on hold given volume issues  Currently receiving IVF given hyponatremia and poor p.o. 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  Derm consulted while inpatient; appreciate recommendations  Will continue home prednisone 5 mg twice  daily  Outpatient planning per [facility]    Paroxysmal atrial fibrillation (HCC)  Assessment & Plan  Currently rate controlled  Will restart home metoprolol at 12.5 mg twice daily  Continue home Eliquis 5 mg twice daily for stroke prophylaxis    Staphylococcal scalded skin syndrome  Assessment & Plan   superimposed on pustular psoriasis  Continue IV cefazolin as above  Follow-up ID recommendations  Monitor WBC count and fever curve    Benign essential hypertension  Assessment & Plan  Monitor on low-dose metoprolol    Rheumatoid arthritis (HCC)  Assessment & Plan  Continue prednisone 5 mg twice daily.  Hydroxychloroquine on hold in the setting of severe sepsis               VTE Pharmacologic Prophylaxis:   Moderate Risk (Score 3-4) - Pharmacological DVT Prophylaxis Ordered: apixaban (Eliquis).    Mobility:   Basic Mobility Inpatient Raw Score: 6  -[facility] Goal: 2: Bed activities/Dependent transfer  -[facility] Achieved: 1: Laying in bed      Patient Centered Rounds: I performed bedside rounds with nursing staff today.   Discussions with Specialists or Other Care Team Provider:     Education and Discussions with Family / Patient: Updated  (daughter) via phone.    Total Time Spent on Date of Encounter in care of patient: 35 mins. This time was spent on one or more of the following: performing physical exam; counseling and coordination of care; obtaining or reviewing history; documenting in the medical record; reviewing/ordering tests, medications or procedures; communicating with other healthcare professionals and discussing with patient's family/caregivers.    Current Length of Stay: 4 day(s)  Current Patient Status: Inpatient   Certification Statement: The patient will continue to require additional inpatient hospital stay due to bacteremia requiring IV antibiotics  Discharge Plan:     Code Status: Level 1 - Full Code    Subjective:   Patient seen and examined.  No events overnight.  Discussed with  nursing.  Noted to have right upper extremity swelling.  Right thumb and fingers swollen and tender to palpation.  Erythema of the right thumb noted    Objective:     Vitals:   Temp (24hrs), Av.3 °F (36.8 °C), Min:97.9 °F (36.6 °C), Max:98.8 °F (37.1 °C)    Temp:  [97.9 °F (36.6 °C)-98.8 °F (37.1 °C)] 97.9 °F (36.6 °C)  HR:  [] 90  Resp:  [16-18] 16  BP: (133-142)/(69-85) 139/70  SpO2:  [96 %-97 %] 96 %  Body mass index is 44.68 kg/m².     Input and Output Summary (last 24 hours):     Intake/Output Summary (Last 24 hours) at 2024 1740  Last data filed at 2024 1528  Gross per 24 hour   Intake 50 ml   Output 775 ml   Net -725 ml       Physical Exam:   Physical Exam  Constitutional:       General: She is not in acute distress.     Appearance: She is not ill-appearing.   HENT:      Head: Normocephalic.      Nose: Nose normal.   Eyes:      General: No scleral icterus.  Cardiovascular:      Rate and Rhythm: Normal rate.   Pulmonary:      Comments: Decreased breath sounds bilaterally  Abdominal:      General: There is no distension.   Musculoskeletal:         General: Normal range of motion.   Skin:     Findings: Lesion present.      Comments: Extensive desquamation noted   Neurological:      Mental Status: She is alert.      Comments: Alert          Additional Data:     Labs:  Results from last 7 days   Lab Units 24  0644   WBC Thousand/uL 5.43   HEMOGLOBIN g/dL 10.4*   HEMATOCRIT % 34.8   PLATELETS Thousands/uL 122*   NEUTROS PCT % 80*   LYMPHS PCT % 9*   MONOS PCT % 7   EOS PCT % 3     Results from last 7 days   Lab Units 24  0644 24  0517   SODIUM mmol/L 142  142 148*  146   POTASSIUM mmol/L 3.3*  3.3* 3.3*  3.5   CHLORIDE mmol/L 107  106 110*  109*   CO2 mmol/L 28  28 25  23   BUN mg/dL 27*  27* 30*  30*   CREATININE mg/dL 1.68*  1.66* 2.02*  2.02*   ANION GAP mmol/L 7  8 13  14   CALCIUM mg/dL 9.2  9.3 9.1  8.9   ALBUMIN g/dL  --  3.3*   TOTAL BILIRUBIN mg/dL  --   0.36   ALK PHOS U/L  --  37   ALT U/L  --  10   AST U/L  --  32   GLUCOSE RANDOM mg/dL 90  92 91  90     Results from last 7 days   Lab Units 01/25/24 2002   INR  2.02*     Results from last 7 days   Lab Units 01/27/24  1203 01/27/24  0622 01/27/24  0059 01/26/24 2000 01/26/24  1637 01/26/24  1130 01/25/24  2004 01/25/24  1822 01/25/24  1308 01/25/24  0712 01/25/24  0507 01/25/24  0005   POC GLUCOSE mg/dl 86 95 107 122 119 90 117 137 92 88 93 123         Results from last 7 days   Lab Units 01/25/24 2002 01/25/24  0815 01/24/24  1216 01/24/24  0828 01/24/24  0250 01/23/24  0232 01/23/24  0013   LACTIC ACID mmol/L 1.5  --  2.4* 2.7*  --  2.3* 2.3*   PROCALCITONIN ng/ml  --  10.15*  --   --  3.29*  --  0.17       Lines/Drains:  Invasive Devices       Peripheral Intravenous Line  Duration             Peripheral IV 01/24/24 Dorsal (posterior);Left Hand 3 days              Drain  Duration             Urethral Catheter 4 days                  Urinary Catheter:  Goal for removal: Voiding trial when ambulation improves               Imaging: Reviewed radiology reports from this admission including: abdominal/pelvic CT    Recent Cultures (last 7 days):   Results from last 7 days   Lab Units 01/25/24  0535 01/23/24  0013   BLOOD CULTURE  No Growth at 48 hrs.  No Growth at 48 hrs. Staphylococcus aureus*  Staphylococcus lugdunensis*  Staphylococcus capitis*   GRAM STAIN RESULT   --  Gram positive cocci in clusters*  Gram positive cocci in clusters*       Last 24 Hours Medication List:   Current Facility-Administered Medications   Medication Dose Route Frequency Provider Last Rate    acetaminophen  650 mg Oral Q4H PRN AZIZA Wolf      albuterol  2 puff Inhalation Q6H PRN Pepe Do MD      apixaban  5 mg Oral BID AZIZA Wolf      cefazolin  2,000 mg Intravenous Q8H AZIZA Wolf Stopped (01/27/24 1528)    chlorhexidine  15 mL Mouth/Throat Q12H AZIZA Chi       cholecalciferol  1,000 Units Oral Daily Pepe Do MD      docusate sodium  100 mg Oral BID PRN Pepe Do MD      metoprolol  5 mg Intravenous Q6H PRN AZIZA Wolf      metoprolol tartrate  12.5 mg Oral Q12H FAMILIA Issac Minor DO      ondansetron  4 mg Intravenous Q6H PRN Pepe Do MD      pantoprazole  40 mg Oral Early Morning AZIZA Wolf      predniSONE  5 mg Oral BID With Meals Issac Minor,       white petrolatum-mineral oil   Topical TID Pepe Do MD          Today, Patient Was Seen By: Valentine Brewer MD    **Please Note: This note may have been constructed using a voice recognition system.**

## 2024-01-27 NOTE — ASSESSMENT & PLAN NOTE
superimposed on pustular psoriasis  Continue IV cefazolin as above  Follow-up ID recommendations  Monitor WBC count and fever curve   76 yo w/f here for follow up.  No CP, SOB, edema, abdominal pain, N, V, D, C, bleeding, weight change.  Has urine incontinence occasionally with nocturia x 1.  No rashes, weakness, numbness, tingling, syncope.     Meds as listed

## 2024-01-28 ENCOUNTER — APPOINTMENT (INPATIENT)
Dept: NON INVASIVE DIAGNOSTICS | Facility: HOSPITAL | Age: 74
DRG: 871 | End: 2024-01-28
Payer: COMMERCIAL

## 2024-01-28 ENCOUNTER — APPOINTMENT (INPATIENT)
Dept: RADIOLOGY | Facility: HOSPITAL | Age: 74
DRG: 871 | End: 2024-01-28
Payer: COMMERCIAL

## 2024-01-28 PROBLEM — R31.9 HEMATURIA: Status: ACTIVE | Noted: 2024-01-28

## 2024-01-28 LAB
ANION GAP SERPL CALCULATED.3IONS-SCNC: 7 MMOL/L
BACTERIA BLD CULT: ABNORMAL
BACTERIA BLD CULT: ABNORMAL
BASOPHILS # BLD AUTO: 0.02 THOUSANDS/ÂΜL (ref 0–0.1)
BASOPHILS NFR BLD AUTO: 0 % (ref 0–1)
BUN SERPL-MCNC: 27 MG/DL (ref 5–25)
CALCIUM SERPL-MCNC: 9.2 MG/DL (ref 8.4–10.2)
CHLORIDE SERPL-SCNC: 110 MMOL/L (ref 96–108)
CO2 SERPL-SCNC: 29 MMOL/L (ref 21–32)
CREAT SERPL-MCNC: 1.36 MG/DL (ref 0.6–1.3)
EOSINOPHIL # BLD AUTO: 0.14 THOUSAND/ÂΜL (ref 0–0.61)
EOSINOPHIL NFR BLD AUTO: 2 % (ref 0–6)
ERYTHROCYTE [DISTWIDTH] IN BLOOD BY AUTOMATED COUNT: 14.8 % (ref 11.6–15.1)
GFR SERPL CREATININE-BSD FRML MDRD: 38 ML/MIN/1.73SQ M
GLUCOSE SERPL-MCNC: 109 MG/DL (ref 65–140)
GLUCOSE SERPL-MCNC: 74 MG/DL (ref 65–140)
GLUCOSE SERPL-MCNC: 76 MG/DL (ref 65–140)
GLUCOSE SERPL-MCNC: 78 MG/DL (ref 65–140)
GLUCOSE SERPL-MCNC: 83 MG/DL (ref 65–140)
GRAM STN SPEC: ABNORMAL
HCT VFR BLD AUTO: 36.3 % (ref 34.8–46.1)
HGB BLD-MCNC: 10.8 G/DL (ref 11.5–15.4)
IMM GRANULOCYTES # BLD AUTO: 0.06 THOUSAND/UL (ref 0–0.2)
IMM GRANULOCYTES NFR BLD AUTO: 1 % (ref 0–2)
LYMPHOCYTES # BLD AUTO: 0.72 THOUSANDS/ÂΜL (ref 0.6–4.47)
LYMPHOCYTES NFR BLD AUTO: 11 % (ref 14–44)
MCH RBC QN AUTO: 27.1 PG (ref 26.8–34.3)
MCHC RBC AUTO-ENTMCNC: 29.8 G/DL (ref 31.4–37.4)
MCV RBC AUTO: 91 FL (ref 82–98)
MONOCYTES # BLD AUTO: 0.42 THOUSAND/ÂΜL (ref 0.17–1.22)
MONOCYTES NFR BLD AUTO: 7 % (ref 4–12)
NEUTROPHILS # BLD AUTO: 5.04 THOUSANDS/ÂΜL (ref 1.85–7.62)
NEUTS SEG NFR BLD AUTO: 79 % (ref 43–75)
NRBC BLD AUTO-RTO: 0 /100 WBCS
PLATELET # BLD AUTO: 124 THOUSANDS/UL (ref 149–390)
PMV BLD AUTO: 13.3 FL (ref 8.9–12.7)
POTASSIUM SERPL-SCNC: 3.4 MMOL/L (ref 3.5–5.3)
RBC # BLD AUTO: 3.99 MILLION/UL (ref 3.81–5.12)
S AUREUS DNA BLD POS QL NAA+NON-PROBE: DETECTED
S LUGDUNENSIS DNA BLD POS QL NAA+NON-PRB: DETECTED
SODIUM SERPL-SCNC: 146 MMOL/L (ref 135–147)
WBC # BLD AUTO: 6.4 THOUSAND/UL (ref 4.31–10.16)

## 2024-01-28 PROCEDURE — 93971 EXTREMITY STUDY: CPT | Performed by: SURGERY

## 2024-01-28 PROCEDURE — 80048 BASIC METABOLIC PNL TOTAL CA: CPT | Performed by: STUDENT IN AN ORGANIZED HEALTH CARE EDUCATION/TRAINING PROGRAM

## 2024-01-28 PROCEDURE — 82948 REAGENT STRIP/BLOOD GLUCOSE: CPT

## 2024-01-28 PROCEDURE — 99232 SBSQ HOSP IP/OBS MODERATE 35: CPT | Performed by: FAMILY MEDICINE

## 2024-01-28 PROCEDURE — 85025 COMPLETE CBC W/AUTO DIFF WBC: CPT | Performed by: STUDENT IN AN ORGANIZED HEALTH CARE EDUCATION/TRAINING PROGRAM

## 2024-01-28 PROCEDURE — 93971 EXTREMITY STUDY: CPT

## 2024-01-28 PROCEDURE — 73130 X-RAY EXAM OF HAND: CPT

## 2024-01-28 RX ORDER — FUROSEMIDE 40 MG/1
40 TABLET ORAL DAILY
Status: DISCONTINUED | OUTPATIENT
Start: 2024-01-29 | End: 2024-01-30

## 2024-01-28 RX ORDER — POTASSIUM CHLORIDE 20 MEQ/1
40 TABLET, EXTENDED RELEASE ORAL ONCE
Status: COMPLETED | OUTPATIENT
Start: 2024-01-28 | End: 2024-01-28

## 2024-01-28 RX ADMIN — Medication 12.5 MG: at 10:28

## 2024-01-28 RX ADMIN — CHLORHEXIDINE GLUCONATE 15 ML: 1.2 SOLUTION ORAL at 20:33

## 2024-01-28 RX ADMIN — CEFAZOLIN SODIUM 2000 MG: 2 SOLUTION INTRAVENOUS at 22:03

## 2024-01-28 RX ADMIN — CEFAZOLIN SODIUM 2000 MG: 2 SOLUTION INTRAVENOUS at 14:47

## 2024-01-28 RX ADMIN — CEFAZOLIN SODIUM 2000 MG: 2 SOLUTION INTRAVENOUS at 06:57

## 2024-01-28 RX ADMIN — APIXABAN 5 MG: 5 TABLET, FILM COATED ORAL at 10:32

## 2024-01-28 RX ADMIN — Medication: at 10:27

## 2024-01-28 RX ADMIN — Medication: at 18:53

## 2024-01-28 RX ADMIN — POLYETHYLENE GLYCOL 3350 17 G: 17 POWDER, FOR SOLUTION ORAL at 10:29

## 2024-01-28 RX ADMIN — APIXABAN 5 MG: 5 TABLET, FILM COATED ORAL at 19:29

## 2024-01-28 RX ADMIN — PREDNISONE 5 MG: 5 TABLET ORAL at 19:29

## 2024-01-28 RX ADMIN — Medication 1000 UNITS: at 10:28

## 2024-01-28 RX ADMIN — SENNOSIDES 17.2 MG: 8.6 TABLET, FILM COATED ORAL at 22:00

## 2024-01-28 RX ADMIN — POTASSIUM CHLORIDE 40 MEQ: 1500 TABLET, EXTENDED RELEASE ORAL at 20:20

## 2024-01-28 RX ADMIN — Medication 12.5 MG: at 20:20

## 2024-01-28 RX ADMIN — CHLORHEXIDINE GLUCONATE 15 ML: 1.2 SOLUTION ORAL at 10:29

## 2024-01-28 RX ADMIN — PREDNISONE 5 MG: 5 TABLET ORAL at 10:27

## 2024-01-28 NOTE — PLAN OF CARE
Problem: PAIN - ADULT  Goal: Verbalizes/displays adequate comfort level or baseline comfort level  Description: Interventions:  - Encourage patient to monitor pain and request assistance  - Assess pain using appropriate pain scale  - Administer analgesics based on type and severity of pain and evaluate response  - Implement non-pharmacological measures as appropriate and evaluate response  - Consider cultural and social influences on pain and pain management  - Notify physician/advanced practitioner if interventions unsuccessful or patient reports new pain  Outcome: Progressing     Problem: INFECTION - ADULT  Goal: Absence or prevention of progression during hospitalization  Description: INTERVENTIONS:  - Assess and monitor for signs and symptoms of infection  - Monitor lab/diagnostic results  - Monitor all insertion sites, i.e. indwelling lines, tubes, and drains  - Monitor endotracheal if appropriate and nasal secretions for changes in amount and color  - [place] appropriate cooling/warming therapies per order  - Administer medications as ordered  - Instruct and encourage patient and family to use good hand hygiene technique  - Identify and instruct in appropriate isolation precautions for identified infection/condition  Outcome: Progressing  Goal: Absence of fever/infection during neutropenic period  Description: INTERVENTIONS:  - Monitor WBC    Outcome: Progressing     Problem: SAFETY ADULT  Goal: Patient will remain free of falls  Description: INTERVENTIONS:  - Educate patient/family on patient safety including physical limitations  - Instruct patient to call for assistance with activity   - Consult OT/PT to assist with strengthening/mobility   - Keep Call bell within reach  - Keep bed low and locked with side rails adjusted as appropriate  - Keep care items and personal belongings within reach  - Initiate and maintain comfort rounds  - Make Fall Risk Sign visible to staff  - Offer Toileting every 2 Hours,  in advance of need  - Initiate/Maintain bed alarm  - Obtain necessary fall risk management equipment: bed alsrm  - Apply yellow socks and bracelet for high fall risk patients  - Consider moving patient to room near nurses station  Outcome: Progressing  Goal: Maintain or return to baseline ADL function  Description: INTERVENTIONS:  -  Assess patient's ability to carry out ADLs; assess patient's baseline for ADL function and identify physical deficits which impact ability to perform ADLs (bathing, care of mouth/teeth, toileting, grooming, dressing, etc.)  - Assess/evaluate cause of self-care deficits   - Assess range of motion  - Assess patient's mobility; develop plan if impaired  - Assess patient's need for assistive devices and provide as appropriate  - Encourage maximum independence but intervene and supervise when necessary  - Involve family in performance of ADLs  - Assess for home care needs following discharge   - Consider OT consult to assist with ADL evaluation and planning for discharge  - Provide patient education as appropriate  Outcome: Progressing  Goal: Maintains/Returns to pre admission functional level  Description: INTERVENTIONS:  - Perform AM-PAC 6 Click Basic Mobility/ Daily Activity assessment daily.  - Set and communicate daily mobility goal to care team and patient/family/caregiver.   - Collaborate with rehabilitation services on mobility goals if consulted  - Perform Range of Motion 4 times a day.  - Reposition patient every 2 hours.  - Dangle patient 4 times a day  - Stand patient 4 times a day  - Ambulate patient 4 times a day  - Out of bed to chair 4 times a day   - Out of bed for meals 4 times a day  - Out of bed for toileting  - Record patient progress and toleration of activity level   Outcome: Progressing     Problem: DISCHARGE PLANNING  Goal: Discharge to home or other facility with appropriate resources  Description: INTERVENTIONS:  - Identify barriers to discharge w/patient and  caregiver  - Arrange for needed discharge resources and transportation as appropriate  - Identify discharge learning needs (meds, wound care, etc.)  - Arrange for interpretive services to assist at discharge as needed  - Refer to Case Management Department for coordinating discharge planning if the patient needs post-hospital services based on physician/advanced practitioner order or complex needs related to functional status, cognitive ability, or social support system  Outcome: Progressing     Problem: Knowledge Deficit  Goal: Patient/family/caregiver demonstrates understanding of disease process, treatment plan, medications, and discharge instructions  Description: Complete learning assessment and assess knowledge base.  Interventions:  - Provide teaching at level of understanding  - Provide teaching via preferred learning methods  Outcome: Progressing     Problem: CARDIOVASCULAR - ADULT  Goal: Maintains optimal cardiac output and hemodynamic stability  Description: INTERVENTIONS:  - Monitor I/O, vital signs and rhythm  - Monitor for S/S and trends of decreased cardiac output  - Administer and titrate ordered vasoactive medications to optimize hemodynamic stability  - Assess quality of pulses, skin color and temperature  - Assess for signs of decreased coronary artery perfusion  - Instruct patient to report change in severity of symptoms  Outcome: Progressing  Goal: Absence of cardiac dysrhythmias or at baseline rhythm  Description: INTERVENTIONS:  - Continuous cardiac monitoring, vital signs, obtain 12 lead EKG if ordered  - Administer antiarrhythmic and heart rate control medications as ordered  - Monitor electrolytes and administer replacement therapy as ordered  Outcome: Progressing     Problem: RESPIRATORY - ADULT  Goal: Achieves optimal ventilation and oxygenation  Description: INTERVENTIONS:  - Assess for changes in respiratory status  - Assess for changes in mentation and behavior  - Position to  facilitate oxygenation and minimize respiratory effort  - Oxygen administered by appropriate delivery if ordered  - Initiate smoking cessation education as indicated  - Encourage broncho-pulmonary hygiene including cough, deep breathe, Incentive Spirometry  - Assess the need for suctioning and aspirate as needed  - Assess and instruct to report SOB or any respiratory difficulty  - Respiratory Therapy support as indicated  Outcome: Progressing     Problem: METABOLIC, FLUID AND ELECTROLYTES - ADULT  Goal: Electrolytes maintained within normal limits  Description: INTERVENTIONS:  - Monitor labs and assess patient for signs and symptoms of electrolyte imbalances  - Administer electrolyte replacement as ordered  - Monitor response to electrolyte replacements, including repeat lab results as appropriate  - Instruct patient on fluid and nutrition as appropriate  Outcome: Progressing  Goal: Fluid balance maintained  Description: INTERVENTIONS:  - Monitor labs   - Monitor I/O and WT  - Instruct patient on fluid and nutrition as appropriate  - Assess for signs & symptoms of volume excess or deficit  Outcome: Progressing  Goal: Glucose maintained within target range  Description: INTERVENTIONS:  - Monitor Blood Glucose as ordered  - Assess for signs and symptoms of hyperglycemia and hypoglycemia  - Administer ordered medications to maintain glucose within target range  - Assess nutritional intake and initiate nutrition service referral as needed  Outcome: Progressing     Problem: SKIN/TISSUE INTEGRITY - ADULT  Goal: Skin Integrity remains intact(Skin Breakdown Prevention)  Description: Assess:  -Perform [place] assessment every 12  -Clean and moisturize skin every 8  -Inspect skin when repositioning, toileting, and assisting with ADLS  -Assess under medical devices such as 2 every daily4  -Assess extremities for adequate circulation and sensation     Bed Management:  -Have minimal linens on bed & keep smooth,  unwrinkled  -Change linens as needed when moist or perspiring  -Avoid sitting or lying in one position for more than 2 hours while in bed  -Keep HOB at 30degrees     Toileting:  -Offer bedside commode  -Assess for incontinence every 2  -Use incontinent care products after each incontinent episode such as cream     Activity:  -Mobilize patient 4 times a day  -Encourage activity and walks on unit  -Encourage or provide ROM exercises   -Turn and reposition patient every 2 Hours  -Use appropriate equipment to lift or move patient in bed  -Instruct/ Assist with weight shifting every 3 when out of bed in chair  -Consider limitation of chair time 2 hour intervals    Skin Care:  -Avoid use of baby powder, tape, friction and shearing, hot water or constrictive clothing  -Relieve pressure over bony prominences using silcon dressing  -Do not massage red bony areas    Next Steps:  -Teach patient strategies to minimize risks such as moving    -Consider consults to  interdisciplinary teams such as wound care   Outcome: Progressing  Goal: Incision(s), wounds(s) or drain site(s) healing without S/S of infection  Description: INTERVENTIONS  - Assess and document dressing, incision, wound bed, drain sites and surrounding tissue  - Provide patient and family education  - Perform skin care/dressing changes every daily  Outcome: Progressing  Goal: Pressure injury heals and does not worsen  Description: Interventions:  - Implement low air loss mattress or specialty surface (Criteria met)  - Apply silicone foam dressing  - Instruct/assist with weight shifting every 120 minutes when in chair   - Limit chair time to 2 hour intervals  - Use special pressure reducing interventions such as waffle cushiom when in chair   - Apply fecal or urinary incontinence containment device   - Perform passive or active ROM every 2 hr  - Turn and reposition patient & offload bony prominences every 2 hours   - Utilize friction reducing device or surface for  transfers   - Consider consults to  interdisciplinary teams such as wound care  - Use incontinent care products after each incontinent episode such as creams  - Consider nutrition services referral as needed  Outcome: Progressing     Problem: Nutrition/Hydration-ADULT  Goal: Nutrient/Hydration intake appropriate for improving, restoring or maintaining nutritional needs  Description: Monitor and assess patient's nutrition/hydration status for malnutrition. Collaborate with interdisciplinary team and initiate plan and interventions as ordered.  Monitor patient's weight and dietary intake as ordered or per policy. Utilize nutrition screening tool and intervene as necessary. Determine patient's food preferences and provide high-protein, high-caloric foods as appropriate.     INTERVENTIONS:  - Monitor oral intake, urinary output, labs, and treatment plans  - Assess nutrition and hydration status and recommend course of action  - Evaluate amount of meals eaten  - Assist patient with eating if necessary   - Allow adequate time for meals  - Recommend/ encourage appropriate diets, oral nutritional supplements, and vitamin/mineral supplements  - Order, calculate, and assess calorie counts as needed  - Recommend, monitor, and adjust tube feedings and TPN/PPN based on assessed needs  - Assess need for intravenous fluids  - Provide specific nutrition/hydration education as appropriate  - Include patient/family/caregiver in decisions related to nutrition  Outcome: Progressing

## 2024-01-29 ENCOUNTER — TELEPHONE (OUTPATIENT)
Dept: OTHER | Facility: HOSPITAL | Age: 74
End: 2024-01-29

## 2024-01-29 ENCOUNTER — APPOINTMENT (INPATIENT)
Dept: RADIOLOGY | Facility: HOSPITAL | Age: 74
DRG: 871 | End: 2024-01-29
Attending: FAMILY MEDICINE
Payer: COMMERCIAL

## 2024-01-29 PROBLEM — R22.31 LOCALIZED SWELLING ON RIGHT HAND: Status: ACTIVE | Noted: 2024-01-29

## 2024-01-29 LAB
GLUCOSE SERPL-MCNC: 100 MG/DL (ref 65–140)
GLUCOSE SERPL-MCNC: 69 MG/DL (ref 65–140)
GLUCOSE SERPL-MCNC: 71 MG/DL (ref 65–140)
GLUCOSE SERPL-MCNC: 73 MG/DL (ref 65–140)
GLUCOSE SERPL-MCNC: 76 MG/DL (ref 65–140)
GLUCOSE SERPL-MCNC: 86 MG/DL (ref 65–140)

## 2024-01-29 PROCEDURE — NC001 PR NO CHARGE: Performed by: RADIOLOGY

## 2024-01-29 PROCEDURE — 99223 1ST HOSP IP/OBS HIGH 75: CPT | Performed by: UROLOGY

## 2024-01-29 PROCEDURE — 02HV33Z INSERTION OF INFUSION DEVICE INTO SUPERIOR VENA CAVA, PERCUTANEOUS APPROACH: ICD-10-PCS | Performed by: RADIOLOGY

## 2024-01-29 PROCEDURE — NC001 PR NO CHARGE: Performed by: INTERNAL MEDICINE

## 2024-01-29 PROCEDURE — 99233 SBSQ HOSP IP/OBS HIGH 50: CPT | Performed by: INTERNAL MEDICINE

## 2024-01-29 PROCEDURE — 99232 SBSQ HOSP IP/OBS MODERATE 35: CPT | Performed by: FAMILY MEDICINE

## 2024-01-29 PROCEDURE — 77001 FLUOROGUIDE FOR VEIN DEVICE: CPT

## 2024-01-29 PROCEDURE — 36573 INSJ PICC RS&I 5 YR+: CPT

## 2024-01-29 PROCEDURE — 82948 REAGENT STRIP/BLOOD GLUCOSE: CPT

## 2024-01-29 PROCEDURE — 76937 US GUIDE VASCULAR ACCESS: CPT

## 2024-01-29 PROCEDURE — C1751 CATH, INF, PER/CENT/MIDLINE: HCPCS

## 2024-01-29 RX ORDER — ACETAMINOPHEN 650 MG/1
650 SUPPOSITORY RECTAL ONCE
Status: COMPLETED | OUTPATIENT
Start: 2024-01-29 | End: 2024-01-29

## 2024-01-29 RX ORDER — ACETAMINOPHEN 325 MG/1
650 TABLET ORAL EVERY 4 HOURS PRN
Status: DISCONTINUED | OUTPATIENT
Start: 2024-01-29 | End: 2024-02-10 | Stop reason: HOSPADM

## 2024-01-29 RX ORDER — DEXTROSE MONOHYDRATE 25 G/50ML
25 INJECTION, SOLUTION INTRAVENOUS ONCE
Status: COMPLETED | OUTPATIENT
Start: 2024-01-29 | End: 2024-01-29

## 2024-01-29 RX ORDER — LIDOCAINE HYDROCHLORIDE 10 MG/ML
INJECTION, SOLUTION EPIDURAL; INFILTRATION; INTRACAUDAL; PERINEURAL AS NEEDED
Status: COMPLETED | OUTPATIENT
Start: 2024-01-29 | End: 2024-01-29

## 2024-01-29 RX ORDER — HYDROXYCHLOROQUINE SULFATE 200 MG/1
200 TABLET, FILM COATED ORAL 2 TIMES DAILY
Status: DISCONTINUED | OUTPATIENT
Start: 2024-01-29 | End: 2024-02-10 | Stop reason: HOSPADM

## 2024-01-29 RX ADMIN — SENNOSIDES 17.2 MG: 8.6 TABLET, FILM COATED ORAL at 22:30

## 2024-01-29 RX ADMIN — HYDROCORTISONE SODIUM SUCCINATE 25 MG: 100 INJECTION, POWDER, FOR SOLUTION INTRAMUSCULAR; INTRAVENOUS at 22:48

## 2024-01-29 RX ADMIN — PANTOPRAZOLE SODIUM 40 MG: 40 TABLET, DELAYED RELEASE ORAL at 06:08

## 2024-01-29 RX ADMIN — CEFAZOLIN SODIUM 2000 MG: 2 SOLUTION INTRAVENOUS at 22:48

## 2024-01-29 RX ADMIN — CEFAZOLIN SODIUM 2000 MG: 2 SOLUTION INTRAVENOUS at 06:08

## 2024-01-29 RX ADMIN — Medication: at 09:24

## 2024-01-29 RX ADMIN — CHLORHEXIDINE GLUCONATE 15 ML: 1.2 SOLUTION ORAL at 22:43

## 2024-01-29 RX ADMIN — PREDNISONE 5 MG: 5 TABLET ORAL at 09:24

## 2024-01-29 RX ADMIN — Medication 12.5 MG: at 22:30

## 2024-01-29 RX ADMIN — LIDOCAINE HYDROCHLORIDE 5 ML: 10 INJECTION, SOLUTION EPIDURAL; INFILTRATION; INTRACAUDAL; PERINEURAL at 16:46

## 2024-01-29 RX ADMIN — CEFAZOLIN SODIUM 2000 MG: 2 SOLUTION INTRAVENOUS at 13:59

## 2024-01-29 RX ADMIN — DEXTROSE MONOHYDRATE 25 ML: 25 INJECTION, SOLUTION INTRAVENOUS at 13:53

## 2024-01-29 RX ADMIN — CHLORHEXIDINE GLUCONATE 15 ML: 1.2 SOLUTION ORAL at 09:25

## 2024-01-29 RX ADMIN — APIXABAN 5 MG: 5 TABLET, FILM COATED ORAL at 09:24

## 2024-01-29 RX ADMIN — Medication: at 18:00

## 2024-01-29 RX ADMIN — FUROSEMIDE 40 MG: 40 TABLET ORAL at 09:24

## 2024-01-29 RX ADMIN — POLYETHYLENE GLYCOL 3350 17 G: 17 POWDER, FOR SOLUTION ORAL at 09:24

## 2024-01-29 RX ADMIN — ACETAMINOPHEN 650 MG: 650 SUPPOSITORY RECTAL at 13:29

## 2024-01-29 RX ADMIN — Medication 1000 UNITS: at 09:24

## 2024-01-29 RX ADMIN — Medication 12.5 MG: at 09:24

## 2024-01-29 RX ADMIN — Medication: at 22:32

## 2024-01-29 RX ADMIN — Medication: at 00:06

## 2024-01-29 NOTE — ASSESSMENT & PLAN NOTE
Continue prednisone 5 mg twice daily.  Hydroxychloroquine on hold in the setting of severe sepsis  Start back on the Plaquenil likely by tomorrow after discussion with infectious disease

## 2024-01-29 NOTE — ASSESSMENT & PLAN NOTE
Secondary to decreased p.o. intake from dysphagia and modified diet  Diet has now been advanced to puréed/thin liquids which should help  Hypernatremia resolved

## 2024-01-29 NOTE — ASSESSMENT & PLAN NOTE
Septic shock secondary to staph bacteremia, cellulitis.  Transiently required vasopressor support in the ICU and stress dose steroids  Continue on IV cefazolin per ID recommendations  Monitor WBC count and fever curve  Monitor wounds clinically with local wound care  Follow-up repeat blood cultures-so far remained negative  Resumed home prednisone dose 5 mg twice daily

## 2024-01-29 NOTE — ASSESSMENT & PLAN NOTE
Patient with dermatitis.  Continue Eucerin.  In order to avoid further skin infection possibly portal of entry being sites of skin breakdown, will put Corey catheter-  Voiding trial once the skin lesions are desquamated

## 2024-01-29 NOTE — PROGRESS NOTES
Progress Note - Infectious Disease   Bhavna JANUSZ Al 73 y.o. female MRN: [id number]  Unit/Bed#: [facility] 918-01 Encounter: [id number]      Impression/Plan:  1.  Sepsis.  Appears to be secondary to Staphylococcus aureus and Staphylococcus lugdunensis bacteremia.  The patient does have significant rash with areas of potential cutaneous breakdown that could lead to a transient bacteremia.  Consideration for the possibility of staph scalded skin syndrome.  The patient remains hemodynamically stable despite her systemic illness.  Procalcitonin level has been quite high.  Still with intermittent low-grade fever which may not be infection related but rather inflammatory related to the rheumatoid arthritis  -Antibiotics as below  -Follow-up blood cultures  -Recheck CBC with differential and CMP to make sure no developing antibiotic associated toxicities or worsening infection  -Additional workup as below  -Recheck procalcitonin level  -Recheck sedimentation rate and CRP     2.  MSSA and Staphylococcus lugdunensis bacteremia.  Suspected cutaneous translocation in the setting of advanced psoriasis.  Consideration for the possibility of endovascular infection.  Consideration for the possibility of staph scalded skin syndrome.  Poor windows on transthoracic echocardiogram but no valvular vegetation reported.  Repeat blood cultures are negative  -Continue cefazolin through 2/22/2024 to complete 4 weeks from the first negative blood culture  -Follow-up repeat blood cultures  -Additional workup as needed  -Place PICC line  -Check CBC with differential and creatinine weekly while on the cefazolin     3.  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  The patient has desquamating but the lesions appear to be drying and crusting.  As patient is improved and no longer requiring pressor support, will not add agents such as linezolid to suppress toxin production.  -Antibiotics as above  -Dermatology follow-up  -Serial exams     4.  Acute  encephalopathy.  In the setting of sepsis from bacteremia.  No headache or stiff neck to suggest a primary CNS infection.  Suspect multifactorial in this patient with baseline cognitive issues.  Clinically improved.  -Monitor cognition  -Antibiotics as above for now  -Additional workup as needed     5.  Psoriasis.  Not as active as in the past.  Has been evaluated by dermatology  -Outpatient dermatology follow-up     6.  Atrial fibrillation.  With a rapid ventricular response.  On rate control and anticoagulation     7.  Acute kidney injury.  Complicating chronic kidney disease.  Stage III.  Suspect prerenal issues playing a significant role.  Consideration for the possibility of sepsis playing a role.  Renal function is improving.  -Recheck BMP  -Dose adjusted antibiotics  -Volume management     8.  Rheumatoid arthritis.  On low-dose prednisone and hydroxychloroquine.  Seems to be flaring as far as joint disease.  -Consider rheumatology evaluation  -May need to advance treatment    Discussed with the primary service the plan to continue the cefazolin for 4 weeks from the first negative blood cultures.  They agree with the plan.    I spent 50 minutes on the unit floor of which 30 minutes was in counseling/coordination of care as outlined in my note including beginning arrangements for outpatient intravenous antibiotics, laboratory follow-up, and follow-up with infectious diseases.    Antibiotics:  Cefazolin 6  Antibiotics 7  Negative blood cultures 4    Subjective:  Patient with intermittent low-grade fever; no nausea, vomiting, diarrhea; no cough, shortness of breath; diffuse joint pain. No new symptoms.    Objective:  Vitals:  Temp:  [99 °F (37.2 °C)-100.4 °F (38 °C)] 99 °F (37.2 °C)  HR:  [] 113  Resp:  [14-22] 20  BP: (115-149)/(70-81) 149/81  SpO2:  [92 %-96 %] 92 %  Temp (24hrs), Av.8 °F (37.7 °C), Min:99 °F (37.2 °C), Max:100.4 °F (38 °C)  Current: Temperature: 99 °F (37.2 °C)    Physical Exam:    General Appearance:  Alert, interactive, nontoxic, no acute distress.   Throat: Oropharynx moist without lesions.    Lungs:   Clear to auscultation bilaterally; no wheezes, rhonchi or rales; respirations unlabored   Heart:  RRR; no murmur, rub or gallop   Abdomen:   Soft, non-tender, non-distended, positive bowel sounds.     Extremities: No clubbing, cyanosis.  Diffuse joint pain and swelling.   Skin: Drying rash       Labs, Imaging, & Other studies:   All pertinent labs and imaging studies were personally reviewed  Results from last 7 days   Lab Units 01/28/24  0617 01/27/24  0644 01/26/24  0517   WBC Thousand/uL 6.40 5.43 6.45   HEMOGLOBIN g/dL 10.8* 10.4* 10.3*   PLATELETS Thousands/uL 124* 122* 121*     Results from last 7 days   Lab Units 01/28/24  0617 01/27/24  0644 01/26/24  0517 01/25/24  0815 01/25/24  0545 01/24/24  0250   SODIUM mmol/L 146 142  142 148*  146 145   < > 142   POTASSIUM mmol/L 3.4* 3.3*  3.3* 3.3*  3.5 4.4   < > 4.1   CHLORIDE mmol/L 110* 107  106 110*  109* 108   < > 106   CO2 mmol/L 29 28  28 25  23 23   < > 21   BUN mg/dL 27* 27*  27* 30*  30* 28*   < > 23   CREATININE mg/dL 1.36* 1.68*  1.66* 2.02*  2.02* 2.14*   < > 1.94*   EGFR ml/min/1.73sq m 38 29  29 23  23 22   < > 24   CALCIUM mg/dL 9.2 9.2  9.3 9.1  8.9 8.4   < > 8.3*   AST U/L  --   --  32 41*  --  38   ALT U/L  --   --  10 19  --  19   ALK PHOS U/L  --   --  37 33*  --  38    < > = values in this interval not displayed.     Results from last 7 days   Lab Units 01/25/24  0535 01/23/24  0433 01/23/24  0013   BLOOD CULTURE  No Growth at 72 hrs.  No Growth at 72 hrs.  --  Staphylococcus aureus*  Staphylococcus lugdunensis*  Staphylococcus capitis*   GRAM STAIN RESULT   --   --  Gram positive cocci in clusters*  Gram positive cocci in clusters*   MRSA CULTURE ONLY   --  No Methicillin Resistant Staphlyococcus aureus (MRSA) isolated  --      Results from last 7 days   Lab Units 01/25/24  0815 01/24/24  8736  01/23/24  0013   PROCALCITONIN ng/ml 10.15* 3.29* 0.17

## 2024-01-29 NOTE — SEDATION DOCUMENTATION
Picc line successfully placed without complications. Oozing at site - wrapped with pauly gauze. Report called to Kindra Tadeo RN

## 2024-01-29 NOTE — PROCEDURES
Insert Complex Venous Access Line    Date/Time: 1/29/2024 4:47 PM    Performed by: Kt Chery  Authorized by: Valentine Brewer MD    Patient location:  IR  Consent:     Consent obtained:  Written    Consent given by:  Healthcare agent    Risks discussed:  Bleeding, infection, pneumothorax, nerve damage, incorrect placement and arterial puncture    Alternatives discussed:  No treatment, delayed treatment and alternative treatment  Universal protocol:     Procedure explained and questions answered to patient or proxy's satisfaction: yes      Relevant documents present and verified: yes      Test results available and properly labeled: yes      Radiology Images displayed and confirmed.  If images not available, report reviewed: yes      Required blood products, implants, devices, and special equipment available: yes      Site/side marked: yes      Immediately prior to procedure, a time out was called: yes      Patient identity confirmed:  Arm band and legal responsible patient representative (family)  Pre-procedure details:     Hand hygiene: Hand hygiene performed prior to insertion      Sterile barrier technique: All elements of maximal sterile technique followed      Skin preparation:  2% chlorhexidine    Skin preparation agent: Skin preparation agent completely dried prior to procedure    Indications:     PICC line indications: long term antibiotics    Anesthesia (see MAR for exact dosages):     Anesthesia method:  Local infiltration    Local anesthetic:  Lidocaine 1% w/o epi  Procedure details:     Location:  Brachial    Vessel type: vein      Laterality:  Left    Approach: percutaneous technique used      Patient position:  Flat    Procedural supplies:  Single lumen    Catheter size:  4 Fr    Landmarks identified: yes      Ultrasound guidance: yes      Ultrasound image availability:  Images available in PACS    Sterile ultrasound techniques: Sterile gel and sterile probe covers were used      Number of  attempts:  1    Successful placement: yes      Cath access vessel: IR fluoroscopy.    Total catheter length (cm):  50    Catheter out on skin (cm):  0    Max flow rate:  999.99    Arm circumference:  32  Post-procedure details:     Post-procedure:  Dressing applied and securement device placed    Assessment:  Blood return through all ports    Post-procedure complications: none      Patient tolerance of procedure:  Tolerated well, no immediate complications

## 2024-01-29 NOTE — ASSESSMENT & PLAN NOTE
Creatinine peaked at 2.7 on 1/25; this is prerenal in the setting of septic shock  Creatinine today is 1.36

## 2024-01-29 NOTE — ASSESSMENT & PLAN NOTE
Urine is clearing  Continue to monitor urine output and color  Eliquis on hold per primary team, resume per primary team  Continue manual bladder irrigations every 4 hours to keep catheter patent and free of clot  CT with nonobstructing punctate right renal calculus and stable bilateral renal cysts unremarkable bladder  Would recommend hematuria workup as outpatient if/when she recovers from this acute event

## 2024-01-29 NOTE — CONSULTS
Consult - Urology   Bhavna Al 1950, 73 y.o. female MRN: [id number]    Unit/Bed#: [facility] 918-01 Encounter: [id number]    Hematuria  Assessment & Plan  Urine is clearing  Continue to monitor urine output and color  Bladder scan  Eliquis on hold per primary team  Continue manual bladder irrigations every 4 hours to keep catheter patent and free of clot  CT with nonobstructing punctate right renal calculus and stable bilateral renal cysts unremarkable bladder  Would recommend hematuria workup as outpatient if/when she recovers from this acute event          Subjective:   73-year-old female admitted on 1/23/2024 from nursing facility where she resides for continued rehab due to sepsis.  She is normally very active and talks coherently and since developing fevers will occasionally open her eyes and give a few short answers but does not converse.  Her sepsis is felt to be secondary to Staphylococcus RES and Staphylococcus lugdunensis bacteremia.  She has a significant rash with areas of cutaneous breakdown concerning for staph scalded skin syndrome.  She also has pustular psoriasis.  She was on vasopressor support in ICU with stress dose steroids.  Infectious disease is following along.  She continues to have ongoing fevers and tachycardia.  She had a Corey catheter placed and developed gross hematuria.  She is on Eliquis twice a day however this was placed on hold after this morning's dose.  CT scan on this admission revealed nonobstructing punctate right renal calculus.  Stable bilateral renal cysts with unremarkable bladder.  Manual irrigation has been performed by nursing.  There has been no clot return per their report.    Additional history of includes rheumatoid arthritis, hypertension, atrial fibrillation, septic shock, pustular psoriasis, chronic diastolic heart failure, CKD, hypernatremia, encephalopathy on this admission, he hematuria on this admission, SIRS, GERD, sarcoidosis, morbid obesity, lumbar  "radiculopathy, osteoporosis, vitamin D deficiency, benign paroxysmal positional vertigo, seasonal allergies, hyperparathyroidism, cardiac murmur, COVID-19, bronchitis, prior bacteremia.    Review of Systems   Unable to perform ROS: Mental status change       Objective:  Vitals: Blood pressure 149/81, pulse (!) 117, temperature (!) 100.7 °F (38.2 °C), resp. rate 20, height 5' 3\" (1.6 m), weight 114 kg (252 lb 3.3 oz), SpO2 91%.,Body mass index is 44.68 kg/m².    Physical Exam  Vitals and nursing note reviewed.   Constitutional:       Appearance: Normal appearance. She is obese. She is ill-appearing.   HENT:      Head: Normocephalic.   Cardiovascular:      Rate and Rhythm: Tachycardia present.   Pulmonary:      Effort: Pulmonary effort is normal. No respiratory distress.   Abdominal:      General: Abdomen is flat. There is no distension.      Palpations: Abdomen is soft.      Tenderness: There is no abdominal tenderness. There is no guarding or rebound.   Genitourinary:     Comments: Corey catheter draining light tiffanie urine  Musculoskeletal:         General: No swelling.   Skin:     General: Skin is warm and dry.   Neurological:      General: No focal deficit present.      Mental Status: She is lethargic.   Psychiatric:      Comments: Will occasionally respond yes or no to questions         Imaging:  CT CHEST, ABDOMEN AND PELVIS WITHOUT IV CONTRAST     INDICATION: Sepsis and abdominal pain.     COMPARISON: 7/10/2022.     TECHNIQUE: CT examination of the chest, abdomen and pelvis was performed without intravenous contrast. Multiplanar 2D reformatted images were created from the source data.     This examination, like all CT scans performed in the [facility], was performed utilizing techniques to minimize radiation dose exposure, including the use of iterative reconstruction and automated exposure control. Radiation dose length   product (DLP) for this visit: 1954.07 mGy-cm     Enteric Contrast: Not " administered.     FINDINGS:     CHEST     LUNGS: Significant respiratory motion artifact. No evidence of airspace consolidation or pulmonary edema. Probable vessel visualized on the prior exam in the right lower lobe. No evidence of pulmonary nodule no tracheal or endobronchial lesion.     PLEURA: No effusion.     HEART/GREAT VESSELS: Stable mild cardiomegaly. No thoracic aortic aneurysm     MEDIASTINUM AND MICHAEL: No lymphadenopathy.     CHEST WALL AND LOWER NECK: Unremarkable.     ABDOMEN     LIVER/BILIARY TREE: Hepatic steatosis and hepatomegaly.     GALLBLADDER: No calcified gallstones. No pericholecystic inflammatory change.     SPLEEN: Unremarkable.     PANCREAS: Partial fatty replacement.     ADRENAL GLANDS: Unremarkable.     KIDNEYS/URETERS: Stable bilateral renal cysts. Punctate nonobstructing right renal calculus. No obstructive uropathy.     STOMACH AND BOWEL: Large amount of stool throughout the colon. No evidence of bowel obstruction, colitis or diverticulitis..     APPENDIX: No findings to suggest appendicitis.     ABDOMINOPELVIC CAVITY: No ascites. No pneumoperitoneum. No lymphadenopathy.     VESSELS: No abdominal aortic aneurysm.     PELVIS     REPRODUCTIVE ORGANS: No pelvic mass or fluid.     URINARY BLADDER: Unremarkable.     ABDOMINAL WALL/INGUINAL REGIONS: Unremarkable.     BONES: Advanced disc degenerative change in the lower thoracic and lumbar spine.     IMPRESSION:        1. No evidence of acute cardiopulmonary process.  2. Findings compatible with constipation. No evidence of colitis, diverticulitis or bowel obstruction.              Workstation performed: [id number]  Imaging reviewed - both report and images personally reviewed.     Labs:  Recent Labs     01/27/24  0644 01/28/24  0617   WBC 5.43 6.40     Recent Labs     01/27/24  0644 01/28/24  0617   HGB 10.4* 10.8*       Recent Labs     01/27/24  0644 01/28/24  0617   CREATININE 1.68*  1.66* 1.36*       Microbiology:  Urine testing with  "10-20 RBCs and 1-2 WBCs with no bacteria seen on 1/23.  Urine did not reflex to culture    History:  Social History     Socioeconomic History    Marital status: Single     Spouse name: None    Number of children: None    Years of education: None    Highest education level: None   Occupational History    Occupation: Retired: Worked for [employer]    Tobacco Use    Smoking status: Never    Smokeless tobacco: Never   Vaping Use    Vaping status: Never Used   Substance and Sexual Activity    Alcohol use: Not Currently    Drug use: No    Sexual activity: Not Currently   Other Topics Concern    None   Social History Narrative     noted in \"allscripts\"      Social Determinants of Health     Financial Resource Strain: Not on file   Food Insecurity: No Food Insecurity (1/24/2024)    Hunger Vital Sign     Worried About Running Out of Food in the Last Year: Never true     Ran Out of Food in the Last Year: Never true   Transportation Needs: No Transportation Needs (1/24/2024)    PRAPARE - Transportation     Lack of Transportation (Medical): No     Lack of Transportation (Non-Medical): No   Physical Activity: Not on file   Stress: Not on file   Social Connections: Not on file   Intimate Partner Violence: Not on file   Housing Stability: Low Risk  (1/24/2024)    Housing Stability Vital Sign     Unable to Pay for Housing in the Last Year: No     Number of Places Lived in the Last Year: 1     Unstable Housing in the Last Year: No       Past Medical History:   Diagnosis Date    Abnormal thyroid function test     last assessed: Sept 25, 2015     Arthritis     Caries     last assessed: Sept 9, 2016     Continuous opioid dependence (HCC) 12/2/2021    Edema of right lower extremity     last assessed: March 18, 2015     GERD (gastroesophageal reflux disease)     Hypertension     Medicare annual wellness visit, subsequent 12/2/2021    Positive blood culture 3/11/2022    Sarcoid      Past Surgical History:   Procedure " Laterality Date     SECTION      MULTIPLE TOOTH EXTRACTIONS N/A 2016    Procedure: Surgical extraction of teeth 2, 18, 19, 30, 31; incision and drainage of left subperiosteal abscess ;  Surgeon: Clara Cevallos DMD;  Location: [facility];  Service:      Family History   Problem Relation Age of Onset    Asthma Mother     Stroke Mother     No Known Problems Father     No Known Problems Sister     No Known Problems Brother     No Known Problems Maternal Grandmother     No Known Problems Maternal Grandfather     No Known Problems Paternal Grandmother     No Known Problems Paternal Grandfather     Diabetes Maternal Aunt     Breast cancer Cousin     Diabetes Cousin     Breast cancer Other        The following portions of the patient's history were reviewed and updated as appropriate: allergies, current medications, past family history, past medical history, past social history, past surgical history and problem list    AZIZA Moreau  Date: 2024 Time: 2:13 PM

## 2024-01-29 NOTE — TELEPHONE ENCOUNTER
Hematuria on eliquis. Will need outpatient cysto to complete hematuria workup. Pt resides in a facility

## 2024-01-29 NOTE — WOUND OSTOMY CARE
Progress Note - Wound   Bhavna P Al 73 y.o. female MRN: [id number]  Unit/Bed#: [facility] 918-01 Encounter: [id number]      Assessment:     Patient admitted to [facility] with septic shock. Patient with history of morbid obesity with dermatitis, GERD, HTN, AFIB, CHF, ambulatory dysfunction. Patient is alert to self and does not follow commands. Patient is incontinent of bowel and bladder, is dependent for care, and maximum assist for turning and repositioning. Wound care following for sacrum and thigh wounds; new consult for right posterior knee. Patient noted to be on pulmonary progessa low air loss mattress.     HA - Right Lateral Ankle Evolving Deep Tissue Injury - this wound is round/irregular in shape, 100% intact maroon non blanchable skin. Maia wound is light pink, scaly, blanchable. No open aspects for drainage noted. Suspected etiology due to pressure component and immobility.     POA Left Posterior Thigh evolving Deep Tissue Injury -  multiple full thickness wounds pictured and measured together. Wounds are linear/irregular in shape with 80% beefy red wound bed with 20% loose brown slough. Maia-wound is fragile and scaly. Scant serosanguineous drainage noted. This wound has the potential to evolve to a full thickness injury, stage 3 or 4.    POA Left Back Evolving Deep Tissue Injury -  irregular shaped partial thickness wound, with 100% beefy red bleeding tissue present in wound bed. Maia-wound is fragile, dry and scaly.  Scant sanguinous drainage noted. This wound has the potential to evolve to a full thickness injury, stage 3 or 4      Mid Sacrum MASD/IAD - linear shaped partial thickness wound located deep in the intergluteal cleft. Wound bed is pink/ beefy red in color and blanchable. Maia-wound is hyperpigmented. Scant sanguinous drainage noted.       Right buttock MASD - scattered multiple areas of partial thickness wounds pictured and measured together.  Wounds are round in shape, 100% beefy red and blanchable.  Maia wound is hyperpigmented and scaly. Scant serosanguinous drainage noted.     Bilateral heels, hips, elbows, knees - skin is dry, intact, blanchable and scaly. Picture of right knee in media; skin is intact with no wound present.    Educated primary RN  on importance of frequent offloading of pressure via turning, repositioning, and weight-shifting.     No induration, fluctuance, odor, warmth, or purulence noted to the above noted wounds. New dressings applied. Patient tolerated fairly, no non verbal signs or symptoms of pain present.  Primary nurse aware of plan of care. See flow sheets for more detailed assessment findings. Will follow along.       Skin Care Plan:  1-Cleanse sacro-buttocks with soap and water. Apply TRIAD paste to open areas on B/L Sacro-Buttocks TID and PRN episodes of incontinence. Apply hydraguard to intact skin on sacro-buttocks.   2-Turn/reposition q2h for pressure re-distribution on skin .  3-Elevate heels to offload pressure.  4-Moisturize skin daily with skin nourishing cream  5-Ehob cushion in chair when out of bed.  6-Preventative Hydraguard to bilateral heels, elbows, hips BID and PRN.   7-Left Back and Left Posterior Thigh Wounds: Cleanse with NSS and pat dry. Apply silicone bordered foam dressings to areas. Moo with T for Treatment and change every other day or PRN soilage/displacement.   8- Right lateral ankle - Cleanse with NSS and pat dry. Apply silicone bordered foam dressings to areas. Moo with T for Treatment and change every other day or PRN soilage/displacement.   9-P-500 Low air loss mattress ordered for patient. At time of assessment patient is on pulmonary progessa bed.           Wound 07/11/22 MASD Sacrum (Active)   Wound Image   01/29/24 0901   Wound Description Beefy red;Fragile 01/29/24 0901   Maia-wound Assessment Fragile;Hyperpigmented 01/29/24 0901   Wound Length (cm) 3.5 cm 01/29/24 0901   Wound Width (cm) 0.5 cm 01/29/24 0901   Wound Depth (cm) 0.1 cm 01/29/24  0901   Wound Surface Area (cm^2) 1.75 cm^2 01/29/24 0901   Wound Volume (cm^3) 0.175 cm^3 01/29/24 0901   Calculated Wound Volume (cm^3) 0.18 cm^3 01/29/24 0901   Change in Wound Size % 64 01/29/24 0901   Drainage Amount Small 01/29/24 0901   Drainage Description Serosanguineous 01/29/24 0901   Non-staged Wound Description Partial thickness 01/29/24 0901   Treatments Cleansed;Irrigation with NSS 01/29/24 0901   Dressing Protective barrier 01/29/24 0901   Dressing Changed  01/26/24 0901   Patient Tolerance Tolerated poorly 01/29/24 0901   Dressing Status  01/24/24 0901       Wound 01/24/24 Pressure Injury Back Left (Active)   Wound Image   01/29/24 0906   Wound Description Beefy red;Bleeding 01/29/24 0906   Pressure Injury Stage 2 01/29/24 0906   Maia-wound Assessment Fragile;Scaly;Bleeding 01/29/24 0906   Wound Length (cm) 11.5 cm 01/29/24 0906   Wound Width (cm) 12.5 cm 01/29/24 0906   Wound Depth (cm) 0.1 cm 01/29/24 0906   Wound Surface Area (cm^2) 143.75 cm^2 01/29/24 0906   Wound Volume (cm^3) 14.375 cm^3 01/29/24 0906   Calculated Wound Volume (cm^3) 14.38 cm^3 01/29/24 0906   Change in Wound Size % -499.17 01/29/24 0906   Drainage Amount Scant 01/29/24 0906   Drainage Description Bloody 01/29/24 0906   Non-staged Wound Description Partial thickness 01/29/24 0906   Treatments Cleansed;Irrigation with NSS 01/29/24 0906   Dressing Open to air 01/28/24 0900   Dressing Changed Changed 01/26/24 0906   Patient Tolerance Tolerated well 01/29/24 0906   Dressing Status Clean;Intact;Dry 01/29/24 0906       Wound 01/27/24 Pressure Injury Thigh Right;Posterior (Active)   Wound Image   01/29/24 0900   Wound Description  01/28/24 0900   Maia-wound Assessment  01/27/24 2027   Wound Length (cm) 0 cm 01/29/24 0900   Wound Width (cm) 0 cm 01/29/24 0900   Wound Depth (cm) 0 cm 01/29/24 0900   Wound Surface Area (cm^2) 0 cm^2 01/29/24 0900   Wound Volume (cm^3) 0 cm^3 01/29/24 0900   Calculated Wound Volume (cm^3) 0 cm^3 01/29/24  0900   Dressing Foam, Silicon (eg. Allevyn, etc) 01/28/24 0900   Dressing Status Clean;Dry;Intact 01/28/24 0900       Wound 01/27/24 Pressure Injury Ankle Lateral;Right (Active)   Wound Image   01/29/24 0856   Wound Description Fragile;Light purple 01/29/24 0856   Pressure Injury Stage DTPI 01/29/24 0856   Maia-wound Assessment Purple;Scaly 01/29/24 0856   Wound Length (cm) 1.1 cm 01/29/24 0856   Wound Width (cm) 2 cm 01/29/24 0856   Wound Depth (cm) 0.1 cm 01/29/24 0856   Wound Surface Area (cm^2) 2.2 cm^2 01/29/24 0856   Wound Volume (cm^3) 0.22 cm^3 01/29/24 0856   Calculated Wound Volume (cm^3) 0.22 cm^3 01/29/24 0856   Drainage Amount Scant 01/29/24 0856   Treatments Cleansed;Irrigation with NSS 01/29/24 0856   Dressing Foam, Silicon (eg. Allevyn, etc) 01/29/24 0856   Dressing Changed  01/29/24 0856   Patient Tolerance Tolerated poorly 01/29/24 0856       Wound 01/29/24 MASD Buttocks Right (Active)   Wound Image   01/29/24 0902   Wound Description Beefy red;Dark edges;Drainage 01/29/24 1130   Maia-wound Assessment Scaly;Rash 01/29/24 1130   Wound Length (cm) 6 cm 01/29/24 0902   Wound Width (cm) 2 cm 01/29/24 0902   Wound Depth (cm) 0.1 cm 01/29/24 0902   Wound Surface Area (cm^2) 12 cm^2 01/29/24 0902   Wound Volume (cm^3) 1.2 cm^3 01/29/24 0902   Calculated Wound Volume (cm^3) 1.2 cm^3 01/29/24 0902   Drainage Amount Small 01/29/24 1130   Drainage Description Serosanguineous 01/29/24 1130   Non-staged Wound Description Partial thickness 01/29/24 1130   Dressing Protective barrier 01/29/24 1130   Wound packed? No 01/29/24 0902   Packing- # removed 0 01/29/24 0902   Packing- # inserted 0 01/29/24 0902   Patient Tolerance Tolerated poorly 01/29/24 1130       Wound 01/29/24 MASD Thigh Left;Posterior;Proximal (Active)   Wound Image   01/29/24 0905   Wound Description Beefy red;Drainage;Fragile 01/29/24 1130   Maia-wound Assessment Pink;Scaly;Fragile 01/29/24 1130   Wound Length (cm) 6 cm 01/29/24 0905   Wound  Width (cm) 12 cm 01/29/24 0905   Wound Depth (cm) 0.1 cm 01/29/24 0905   Wound Surface Area (cm^2) 72 cm^2 01/29/24 0905   Wound Volume (cm^3) 7.2 cm^3 01/29/24 0905   Calculated Wound Volume (cm^3) 7.2 cm^3 01/29/24 0905   Drainage Amount Scant 01/29/24 1130   Drainage Description Serosanguineous 01/29/24 1130   Non-staged Wound Description Partial thickness 01/29/24 1130   Treatments Cleansed;Irrigation with NSS;Site care 01/29/24 1130   Dressing Protective barrier 01/29/24 1130   Wound packed? No 01/29/24 0905   Packing- # removed 0 01/29/24 0905   Packing- # inserted 0 01/29/24 0905   Patient Tolerance Tolerated poorly 01/29/24 1130     Call or tigertext with any questions  Wound Care will continue to follow while inpatient.    Batsheva BALLARDN RN CCRN  Wound and Ostomy Care   Assessed with Batsheva BALLARDN RN CWOCN

## 2024-01-29 NOTE — NURSING NOTE
Received [facility] consult for PICC. Received approval from nephrology (see note) given her CKD and GFR 38. Discussed with IR and are able to place PICC this evening and [facility] team unable to place today given high volume of requests. Ordering provider, Dr Brewer, updated on plan. IR order/consult placed. [facility] consult will be completed at this time.

## 2024-01-29 NOTE — ASSESSMENT & PLAN NOTE
superimposed on pustular psoriasis  Continue IV cefazolin as above  Follow-up ID recommendations  Monitor WBC count and fever curve

## 2024-01-29 NOTE — DISCHARGE INSTR - AVS FIRST PAGE
With differential and creatinine weekly while on the cefazolin    Remove PICC line after last dose of cefazolin 2/22/2024

## 2024-01-29 NOTE — PROGRESS NOTES
[facility]  Progress Note  Name: Bhavna Al I  MRN: [id number]  Unit/Bed#: [facility] 918-01 I Date of Admission: 1/22/2024   Date of Service: 1/28/2024 I Hospital Day: 5    Assessment/Plan   * Septic shock (HCC)  Assessment & Plan  Septic shock secondary to staph bacteremia, cellulitis.  Transiently required vasopressor support in the ICU and stress dose steroids  Continue on IV cefazolin per ID recommendations  Monitor WBC count and fever curve  Monitor wounds clinically with local wound care  Follow-up repeat blood cultures-so far remained negative  Resumed home prednisone dose 5 mg twice daily    Hematuria  Assessment & Plan  Hematuria noted overnight.  Continue with irrigation.  If the hematuria does not clear up will obtain urology evaluation-    Encephalopathy  Assessment & Plan  Likely toxic metabolic encephalopathy from the infectious process.  Nonfocal exam  Improving    Hypernatremia  Assessment & Plan  Secondary to decreased p.o. intake from dysphagia and modified diet  Diet has now been advanced to puréed/thin liquids which should help  Hypernatremia resolved    Chronic kidney disease  Assessment & Plan  Creatinine peaked at 2.7 on 1/25; this is prerenal in the setting of septic shock  Creatinine today is 1.36    Dermatitis associated with incontinence  Assessment & Plan  Patient with dermatitis.  Continue Eucerin.  In order to avoid further skin infection possibly portal of entry being sites of skin breakdown, will put Corey catheter-  Voiding trial once the skin lesions are desquamated    Ambulatory dysfunction  Assessment & Plan  Resides in SNF for rehabilitation therapy  Appreciate case management consult    Chronic diastolic heart failure (HCC)  Assessment & Plan  Not in exacerbation  Home Lasix on hold given volume issues  Patient is off of fluids.  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  Hydroxychloroquine on hold in the setting of severe sepsis  Start back on the Plaquenil likely by tomorrow after discussion with infectious disease               VTE Pharmacologic Prophylaxis:   Moderate Risk (Score 3-4) - Pharmacological DVT Prophylaxis Ordered: apixaban (Eliquis).    Mobility:   Basic Mobility Inpatient Raw Score: 6  -[facility] Goal: 2: Bed activities/Dependent transfer  -[facility] Achieved: 1: Laying in bed      Patient Centered Rounds: I performed bedside rounds with nursing staff today.   Discussions with Specialists or Other Care Team Provider:     Education and Discussions with Family / Patient: Attempted to update  (daughter) via phone. Left voicemail.     Total Time Spent on Date of Encounter in care of patient: 35 mins. This time was spent on one or more of the following: performing physical exam; counseling and coordination of care; obtaining or reviewing history; documenting in the medical record; reviewing/ordering tests, medications or procedures; communicating with other healthcare professionals and discussing with patient's family/caregivers.    Current Length of Stay: 5 day(s)  Current Patient Status: Inpatient    Certification Statement: The patient will continue to require additional inpatient hospital stay due to bacteremia requiring IV antibiotics  Discharge Plan: Anticipate discharge in 48-72 hrs to rehab facility.    Code Status: Level 1 - Full Code    Subjective:   Patient seen and examined.  Discussed with nursing.  Patient has a lot of disclamation from the back.  Patient reported that she has skin breakdown in the back  Objective:     Vitals:   Temp (24hrs), Av.9 °F (37.2 °C), Min:98 °F (36.7 °C), Max:100.4 °F (38 °C)    Temp:  [98 °F (36.7 °C)-100.4 °F (38 °C)] 100.4 °F (38 °C)  HR:  [] 111  Resp:  [14-18] 14  BP: (136-143)/(69-70) 136/70  SpO2:  [94 %-98 %] 94 %  Body mass index is 44.68 kg/m².     Input and Output Summary (last 24 hours):     Intake/Output Summary (Last 24 hours) at 2024  Last data filed at 2024  Gross per 24 hour   Intake 100 ml   Output 175 ml   Net -75 ml       Physical Exam:   Physical Exam  Constitutional:       General: She is not in acute distress.  HENT:      Head: Normocephalic and atraumatic.      Nose: Nose normal.   Eyes:      General: No scleral icterus.  Cardiovascular:      Rate and Rhythm: Normal rate and regular rhythm.   Pulmonary:      Comments: Decreased breath sounds bilateral  Abdominal:      General: Bowel sounds are normal. There is no distension.   Musculoskeletal:         General: Swelling (Right hand swelling noted.) and tenderness present. Normal range of motion.   Skin:     General: Skin is warm.      Coloration: Skin is not jaundiced.   Neurological:      Mental Status: She is alert.      Comments: Patient is more awake alert and answering questions more appropriately today          Additional Data:     Labs:  Results from last 7 days   Lab Units 24  0617   WBC Thousand/uL 6.40   HEMOGLOBIN g/dL 10.8*   HEMATOCRIT % 36.3   PLATELETS Thousands/uL 124*   NEUTROS PCT % 79*   LYMPHS PCT % 11*   MONOS PCT % 7   EOS PCT % 2     Results  from last 7 days   Lab Units 01/28/24  0617 01/27/24  0644 01/26/24  0517   SODIUM mmol/L 146   < > 148*  146   POTASSIUM mmol/L 3.4*   < > 3.3*  3.5   CHLORIDE mmol/L 110*   < > 110*  109*   CO2 mmol/L 29   < > 25  23   BUN mg/dL 27*   < > 30*  30*   CREATININE mg/dL 1.36*   < > 2.02*  2.02*   ANION GAP mmol/L 7   < > 13  14   CALCIUM mg/dL 9.2   < > 9.1  8.9   ALBUMIN g/dL  --   --  3.3*   TOTAL BILIRUBIN mg/dL  --   --  0.36   ALK PHOS U/L  --   --  37   ALT U/L  --   --  10   AST U/L  --   --  32   GLUCOSE RANDOM mg/dL 74   < > 91  90    < > = values in this interval not displayed.     Results from last 7 days   Lab Units 01/25/24 2002   INR  2.02*     Results from last 7 days   Lab Units 01/28/24  1753 01/28/24  1138 01/28/24  0716 01/28/24  0042 01/27/24  1815 01/27/24  1203 01/27/24  0622 01/27/24  0059 01/26/24 2000 01/26/24  1637 01/26/24  1130 01/25/24 2004   POC GLUCOSE mg/dl 83 78 76 109 107 86 95 107 122 119 90 117         Results from last 7 days   Lab Units 01/25/24 2002 01/25/24  0815 01/24/24  1216 01/24/24  0828 01/24/24  0250 01/23/24  0232 01/23/24  0013   LACTIC ACID mmol/L 1.5  --  2.4* 2.7*  --  2.3* 2.3*   PROCALCITONIN ng/ml  --  10.15*  --   --  3.29*  --  0.17       Lines/Drains:  Invasive Devices       Peripheral Intravenous Line  Duration             Peripheral IV 01/24/24 Dorsal (posterior);Left Hand 4 days              Drain  Duration             Urethral Catheter 5 days                  Urinary Catheter:  Goal for removal: Discontinue Corey once the skin lesions are cleared               Imaging: Doppler reviewed    Recent Cultures (last 7 days):   Results from last 7 days   Lab Units 01/25/24  0535 01/23/24  0013   BLOOD CULTURE  No Growth at 72 hrs.  No Growth at 72 hrs. Staphylococcus aureus*  Staphylococcus lugdunensis*  Staphylococcus capitis*   GRAM STAIN RESULT   --  Gram positive cocci in clusters*  Gram positive cocci in clusters*       Last 24 Hours  Medication List:   Current Facility-Administered Medications   Medication Dose Route Frequency Provider Last Rate    acetaminophen  650 mg Oral Q4H PRN AZIZA Wolf      albuterol  2 puff Inhalation Q6H PRN Pepe Do MD      apixaban  5 mg Oral BID Sandra Boogie, AZIZA      cefazolin  2,000 mg Intravenous Q8H Sandra Boogie, TYSHAWNNP 2,000 mg (01/28/24 1447)    chlorhexidine  15 mL Mouth/Throat Q12H [facility] AZIZA Wolf      cholecalciferol  1,000 Units Oral Daily Pepe Do MD      docusate sodium  100 mg Oral BID PRN Pepe Do MD      [START ON 1/29/2024] furosemide  40 mg Oral Daily Valentine Brewer MD      metoprolol  5 mg Intravenous Q6H PRN AZIZA Wolf      metoprolol tartrate  12.5 mg Oral Q12H [facility] Issac Minor DO      ondansetron  4 mg Intravenous Q6H PRN Pepe Do MD      pantoprazole  40 mg Oral Early Morning AZIZA Wolf      polyethylene glycol  17 g Oral Daily Valentine Brewer MD      potassium chloride  40 mEq Oral Once Valentine Brewer MD      predniSONE  5 mg Oral BID With Meals Issac Minor DO      senna  2 tablet Oral HS Valentine Brewer MD      white petrolatum-mineral oil   Topical TID Pepe Do MD          Today, Patient Was Seen By: Valentine Brewer MD    **Please Note: This note may have been constructed using a voice recognition system.**

## 2024-01-29 NOTE — DISCHARGE INSTRUCTIONS
Peripherally Inserted Central Catheter     WHAT YOU NEED TO KNOW:   A PICC is an IV placed into a large blood vessel near your heart. It is usually inserted through a blood vessel in your arm. Your PICC may have multiple ports. Ports are tubes where you can inject medicine. A PICC can stay in place for several weeks or months. You may need a PICC to get nutrition, medicine, or fluids. Blood samples can be removed from your PICC and sent to the lab for tests.   DISCHARGE INSTRUCTIONS:    [facility],    Contact Interventional Radiology at [phone]   [facility] PATIENTS: Contact Interventional Radiology at [phone]   STEVE PATIENTS: Contact Interventional Radiology at [phone] if:  Blood soaks through your bandage.    Your arm or leg feels warm, tender, and painful. It may look swollen and red.   You have trouble moving your arm.    Your catheter falls out.  You have a fever or swelling, redness, pain, or pus where the catheter was inserted.  Persistent nausea or vomiting.    You cannot flush your catheter, or you feel pain when you flush your catheter.    You see a hole or crack in the tubing of your catheter.    You see fluid leaking from the insertion site.    You run out of supplies to care for your catheter.    You have questions or concerns about your condition or care.

## 2024-01-29 NOTE — QUICK NOTE
Nephrology has been asked to review chart for PICC line.  Patient does not follow with nephrology as outpatient.  Review medical records through [facility] and care everywhere baseline creatinine 1.3-1.6 dating back to 2022.  Patient admitted for acute kidney injury sepsis has now resolved back to baseline.  Most recent creatinine 1/28/2024 back to baseline at 1.36.  Okay for PICC line as ordered.

## 2024-01-29 NOTE — CASE MANAGEMENT
Case Management Discharge Planning Note    Patient name Bhavna Al  Location [facility] 918/[facility] 918-01 MRN [id number]  : 1950 Date 2024       Current Admission Date: 2024  Current Admission Diagnosis:Septic shock (HCC)   Patient Active Problem List    Diagnosis Date Noted    Hematuria 2024    Encephalopathy 2024    Hypernatremia 2024    Localized swelling on left hand 2023    Chronic kidney disease 2023    Febrile illness 2023    Dermatitis associated with incontinence 2023    Right shoulder pain 12/15/2022    Abnormal urinalysis 2022    Ambulatory dysfunction 2022    Hyperuricemia 2022    Chronic diastolic heart failure (HCC) 2022    Acute bronchitis 2022    Assistance needed with transportation 09/15/2022    Abnormal CT of the chest 2022    Gram-positive cocci bacteremia 2022    Pustular psoriasis 2022    Elevated troponin 2022    COVID-19 2022    Paroxysmal atrial fibrillation (HCC) 01/10/2022    Septic shock ([facility]) 01/10/2022    Hyperparathyroidism ([facility]) 2021    Murmur, cardiac 2021    BPPV (benign paroxysmal positional vertigo) 2018    Seasonal allergic rhinitis due to pollen 2018    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    Osteoporosis 2016    SIRS (systemic inflammatory response syndrome) ([facility]) 2016    Rheumatoid arthritis ([facility]) 2016    GERD (gastroesophageal reflux disease) 2016    Sarcoid 2016    Morbid obesity ([facility]) 2016    Vitamin D deficiency 2015    Benign essential hypertension 2014    Lumbar radiculopathy 2014      LOS (days): 6  Geometric Mean LOS (GMLOS) (days): 5.1  Days to GMLOS:-1.3     OBJECTIVE:  Risk of Unplanned Readmission Score: 21.93         Current admission status: Inpatient   Preferred Pharmacy:   [id number] - [place], [place] - [address]  [address]  [place]  [place] [address]  Phone: [phone] Fax: [phone]    [facility] - [place], [place] - [address]  [address] [address]  Phone: [phone] Fax: [phone]    Primary Care Provider: Nya Taveras DO    Primary Insurance: [facility] REP  Secondary Insurance: [facility]    DISCHARGE DETAILS:       Additional Comments: Per provider, pt to remain inpatient at this time. Pt is a LTC resident at [facility], reserved in [place]. [facility] requesting insurance auth prior to discharge, but that since pt is LTC, pt can return with auth pending. CM to follow.

## 2024-01-29 NOTE — ASSESSMENT & PLAN NOTE
Hematuria noted overnight.  Continue with irrigation.  If the hematuria does not clear up will obtain urology evaluation-

## 2024-01-29 NOTE — ASSESSMENT & PLAN NOTE
Not in exacerbation  Home Lasix on hold given volume issues  Patient is off of fluids.  Restart Lasix by tomorrow since hypernatremia and creatinine improved

## 2024-01-29 NOTE — SPEECH THERAPY NOTE
Attempted to see patient for dysphagia therapy. Patient is lethargic. Daughter is at bedside. She refused lunch today and has been sleeping. Will hold therapy for today and f/u as able.

## 2024-01-30 PROBLEM — N18.30 ACUTE RENAL FAILURE SUPERIMPOSED ON STAGE 3 CHRONIC KIDNEY DISEASE (HCC): Status: ACTIVE | Noted: 2022-01-10

## 2024-01-30 LAB
ALBUMIN SERPL BCP-MCNC: 2.6 G/DL (ref 3.5–5)
ALP SERPL-CCNC: 67 U/L (ref 34–104)
ALT SERPL W P-5'-P-CCNC: <3 U/L (ref 7–52)
ANION GAP SERPL CALCULATED.3IONS-SCNC: 10 MMOL/L
ANION GAP SERPL CALCULATED.3IONS-SCNC: 9 MMOL/L
AST SERPL W P-5'-P-CCNC: 50 U/L (ref 13–39)
BACTERIA BLD CULT: NORMAL
BACTERIA BLD CULT: NORMAL
BASOPHILS # BLD AUTO: 0.02 THOUSANDS/ÂΜL (ref 0–0.1)
BASOPHILS NFR BLD AUTO: 0 % (ref 0–1)
BILIRUB SERPL-MCNC: 0.48 MG/DL (ref 0.2–1)
BUN SERPL-MCNC: 19 MG/DL (ref 5–25)
BUN SERPL-MCNC: 22 MG/DL (ref 5–25)
CALCIUM ALBUM COR SERPL-MCNC: 9.8 MG/DL (ref 8.3–10.1)
CALCIUM SERPL-MCNC: 8.7 MG/DL (ref 8.4–10.2)
CALCIUM SERPL-MCNC: 8.9 MG/DL (ref 8.4–10.2)
CHLORIDE SERPL-SCNC: 111 MMOL/L (ref 96–108)
CHLORIDE SERPL-SCNC: 111 MMOL/L (ref 96–108)
CO2 SERPL-SCNC: 28 MMOL/L (ref 21–32)
CO2 SERPL-SCNC: 30 MMOL/L (ref 21–32)
CREAT SERPL-MCNC: 1.08 MG/DL (ref 0.6–1.3)
CREAT SERPL-MCNC: 1.08 MG/DL (ref 0.6–1.3)
CRP SERPL QL: 192.7 MG/L
EOSINOPHIL # BLD AUTO: 0.08 THOUSAND/ÂΜL (ref 0–0.61)
EOSINOPHIL NFR BLD AUTO: 1 % (ref 0–6)
ERYTHROCYTE [DISTWIDTH] IN BLOOD BY AUTOMATED COUNT: 15 % (ref 11.6–15.1)
ERYTHROCYTE [SEDIMENTATION RATE] IN BLOOD: 65 MM/HOUR (ref 0–29)
GFR SERPL CREATININE-BSD FRML MDRD: 50 ML/MIN/1.73SQ M
GFR SERPL CREATININE-BSD FRML MDRD: 50 ML/MIN/1.73SQ M
GLUCOSE SERPL-MCNC: 110 MG/DL (ref 65–140)
GLUCOSE SERPL-MCNC: 129 MG/DL (ref 65–140)
GLUCOSE SERPL-MCNC: 73 MG/DL (ref 65–140)
GLUCOSE SERPL-MCNC: 82 MG/DL (ref 65–140)
GLUCOSE SERPL-MCNC: 82 MG/DL (ref 65–140)
GLUCOSE SERPL-MCNC: 83 MG/DL (ref 65–140)
GLUCOSE SERPL-MCNC: 85 MG/DL (ref 65–140)
GLUCOSE SERPL-MCNC: 93 MG/DL (ref 65–140)
HCT VFR BLD AUTO: 33.9 % (ref 34.8–46.1)
HGB BLD-MCNC: 10.2 G/DL (ref 11.5–15.4)
IMM GRANULOCYTES # BLD AUTO: 0.04 THOUSAND/UL (ref 0–0.2)
IMM GRANULOCYTES NFR BLD AUTO: 1 % (ref 0–2)
LYMPHOCYTES # BLD AUTO: 0.44 THOUSANDS/ÂΜL (ref 0.6–4.47)
LYMPHOCYTES NFR BLD AUTO: 7 % (ref 14–44)
MCH RBC QN AUTO: 27.6 PG (ref 26.8–34.3)
MCHC RBC AUTO-ENTMCNC: 30.1 G/DL (ref 31.4–37.4)
MCV RBC AUTO: 92 FL (ref 82–98)
MONOCYTES # BLD AUTO: 0.42 THOUSAND/ÂΜL (ref 0.17–1.22)
MONOCYTES NFR BLD AUTO: 7 % (ref 4–12)
NEUTROPHILS # BLD AUTO: 5.1 THOUSANDS/ÂΜL (ref 1.85–7.62)
NEUTS SEG NFR BLD AUTO: 84 % (ref 43–75)
NRBC BLD AUTO-RTO: 0 /100 WBCS
PLATELET # BLD AUTO: 203 THOUSANDS/UL (ref 149–390)
PMV BLD AUTO: 12.7 FL (ref 8.9–12.7)
POTASSIUM SERPL-SCNC: 4.2 MMOL/L (ref 3.5–5.3)
POTASSIUM SERPL-SCNC: 4.2 MMOL/L (ref 3.5–5.3)
PROCALCITONIN SERPL-MCNC: 1.03 NG/ML
PROT SERPL-MCNC: 5.5 G/DL (ref 6.4–8.4)
RBC # BLD AUTO: 3.69 MILLION/UL (ref 3.81–5.12)
SODIUM SERPL-SCNC: 149 MMOL/L (ref 135–147)
SODIUM SERPL-SCNC: 150 MMOL/L (ref 135–147)
WBC # BLD AUTO: 6.1 THOUSAND/UL (ref 4.31–10.16)

## 2024-01-30 PROCEDURE — 92526 ORAL FUNCTION THERAPY: CPT

## 2024-01-30 PROCEDURE — 80048 BASIC METABOLIC PNL TOTAL CA: CPT | Performed by: STUDENT IN AN ORGANIZED HEALTH CARE EDUCATION/TRAINING PROGRAM

## 2024-01-30 PROCEDURE — 99232 SBSQ HOSP IP/OBS MODERATE 35: CPT | Performed by: INTERNAL MEDICINE

## 2024-01-30 PROCEDURE — 80053 COMPREHEN METABOLIC PANEL: CPT | Performed by: INTERNAL MEDICINE

## 2024-01-30 PROCEDURE — 85025 COMPLETE CBC W/AUTO DIFF WBC: CPT | Performed by: STUDENT IN AN ORGANIZED HEALTH CARE EDUCATION/TRAINING PROGRAM

## 2024-01-30 PROCEDURE — 85652 RBC SED RATE AUTOMATED: CPT | Performed by: INTERNAL MEDICINE

## 2024-01-30 PROCEDURE — 82948 REAGENT STRIP/BLOOD GLUCOSE: CPT

## 2024-01-30 PROCEDURE — 99232 SBSQ HOSP IP/OBS MODERATE 35: CPT | Performed by: NURSE PRACTITIONER

## 2024-01-30 PROCEDURE — 84145 PROCALCITONIN (PCT): CPT | Performed by: INTERNAL MEDICINE

## 2024-01-30 PROCEDURE — 86140 C-REACTIVE PROTEIN: CPT | Performed by: INTERNAL MEDICINE

## 2024-01-30 RX ORDER — DEXTROSE AND SODIUM CHLORIDE 5; .45 G/100ML; G/100ML
100 INJECTION, SOLUTION INTRAVENOUS CONTINUOUS
Status: DISCONTINUED | OUTPATIENT
Start: 2024-01-30 | End: 2024-02-03

## 2024-01-30 RX ADMIN — HYDROCORTISONE SODIUM SUCCINATE 25 MG: 100 INJECTION, POWDER, FOR SOLUTION INTRAMUSCULAR; INTRAVENOUS at 22:54

## 2024-01-30 RX ADMIN — ACETAMINOPHEN 650 MG: 325 TABLET, FILM COATED ORAL at 18:23

## 2024-01-30 RX ADMIN — HYDROXYCHLOROQUINE SULFATE 200 MG: 200 TABLET, FILM COATED ORAL at 17:43

## 2024-01-30 RX ADMIN — CEFAZOLIN SODIUM 2000 MG: 2 SOLUTION INTRAVENOUS at 05:45

## 2024-01-30 RX ADMIN — Medication 12.5 MG: at 12:50

## 2024-01-30 RX ADMIN — CEFAZOLIN SODIUM 2000 MG: 2 SOLUTION INTRAVENOUS at 22:57

## 2024-01-30 RX ADMIN — Medication 12.5 MG: at 23:09

## 2024-01-30 RX ADMIN — HYDROXYCHLOROQUINE SULFATE 200 MG: 200 TABLET, FILM COATED ORAL at 12:52

## 2024-01-30 RX ADMIN — Medication: at 23:20

## 2024-01-30 RX ADMIN — HYDROCORTISONE SODIUM SUCCINATE 25 MG: 100 INJECTION, POWDER, FOR SOLUTION INTRAMUSCULAR; INTRAVENOUS at 10:24

## 2024-01-30 RX ADMIN — CEFAZOLIN SODIUM 2000 MG: 2 SOLUTION INTRAVENOUS at 13:30

## 2024-01-30 RX ADMIN — CHLORHEXIDINE GLUCONATE 15 ML: 1.2 SOLUTION ORAL at 12:53

## 2024-01-30 RX ADMIN — POLYETHYLENE GLYCOL 3350 17 G: 17 POWDER, FOR SOLUTION ORAL at 12:50

## 2024-01-30 RX ADMIN — DEXTROSE AND SODIUM CHLORIDE 100 ML/HR: 5; .45 INJECTION, SOLUTION INTRAVENOUS at 12:45

## 2024-01-30 RX ADMIN — Medication 1000 UNITS: at 12:50

## 2024-01-30 RX ADMIN — SENNOSIDES 17.2 MG: 8.6 TABLET, FILM COATED ORAL at 23:09

## 2024-01-30 RX ADMIN — DEXTROSE AND SODIUM CHLORIDE 100 ML/HR: 5; .45 INJECTION, SOLUTION INTRAVENOUS at 22:53

## 2024-01-30 RX ADMIN — CHLORHEXIDINE GLUCONATE 15 ML: 1.2 SOLUTION ORAL at 23:09

## 2024-01-30 RX ADMIN — Medication: at 17:44

## 2024-01-30 NOTE — CASE MANAGEMENT
Case Management Discharge Planning Note    Patient name Bhavna Al  Location [facility] MRN [id number]  : 1950 Date 2024       Current Admission Date: 2024  Current Admission Diagnosis:Septic shock (HCC)   Patient Active Problem List    Diagnosis Date Noted    Localized swelling on right hand 2024    Hematuria 2024    Encephalopathy 2024    Hypernatremia 2024    Localized swelling on left hand 2023    Chronic kidney disease 2023    Febrile illness 2023    Dermatitis associated with incontinence 2023    Right shoulder pain 12/15/2022    Abnormal urinalysis 2022    Ambulatory dysfunction 2022    Hyperuricemia 2022    Chronic diastolic heart failure (HCC) 2022    Acute bronchitis 2022    Assistance needed with transportation 09/15/2022    Abnormal CT of the chest 2022    Gram-positive cocci bacteremia 2022    Pustular psoriasis 2022    Elevated troponin 2022    COVID-19 2022    Paroxysmal atrial fibrillation (HCC) 01/10/2022    Septic shock ([facility]) 01/10/2022    Hyperparathyroidism ([facility]) 2021    Murmur, cardiac 2021    BPPV (benign paroxysmal positional vertigo) 2018    Seasonal allergic rhinitis due to pollen 2018    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    Osteoporosis 2016    SIRS (systemic inflammatory response syndrome) ([facility]) 2016    Rheumatoid arthritis ([facility]) 2016    GERD (gastroesophageal reflux disease) 2016    Sarcoid 2016    Morbid obesity ([facility]) 2016    Vitamin D deficiency 2015    Benign essential hypertension 2014    Lumbar radiculopathy 2014      LOS (days): 7  Geometric Mean LOS (GMLOS) (days): 5.1  Days to GMLOS:-2.3     OBJECTIVE:  Risk of Unplanned Readmission Score: 19.07         Current admission status: Inpatient   Preferred Pharmacy:   [id number] - [place], [place] - [address]  [address]  [place] [address]  Phone: [phone] Fax: [phone]    [facility] - [place], [place] - [facility] [address]  Phone: [phone] Fax: [phone]    Primary Care Provider: Nya Taveras DO    Primary Insurance: [facility]  Secondary Insurance: [facility]    DISCHARGE DETAILS:       Additional Comments: Per provider, pt to continue to remain inpatient at this time. Once medically clear, pt to return to [facility]. [place] requesting insruance auth. CM to continue to follow.

## 2024-01-30 NOTE — ASSESSMENT & PLAN NOTE
Not in exacerbation  Home Lasix on hold given volume issues  Patient is off of fluids.  Restarted diuretics

## 2024-01-30 NOTE — PROGRESS NOTES
"Progress Note - Urology  Bhavna Al 1950, 73 y.o. female MRN: [id number]    Unit/Bed#: [facility] 918-01 Encounter: [id number]    Hematuria  Assessment & Plan  Urine is clearing  Continue to monitor urine output and color  Eliquis on hold per primary team, resume per primary team  Continue manual bladder irrigations every 4 hours to keep catheter patent and free of clot  CT with nonobstructing punctate right renal calculus and stable bilateral renal cysts unremarkable bladder  Would recommend hematuria workup as outpatient if/when she recovers from this acute event      Urology will sign off but remain available for any further inpatient needs. Please feel free to contact the provider currently covering the Urology [facility] role for this campus with questions or concerns.     Subjective:  sepsis is felt to be secondary to Staphylococcus RES and Staphylococcus lugdunensis bacteremia.  She has a significant rash with areas of cutaneous breakdown concerning for staph scalded skin syndrome.  She also has pustular psoriasis.  She was on vasopressor support in ICU with stress dose steroids.  Infectious disease is following along.  She was having ongoing fevers and tachycardia, now resolved.  She had a Corey catheter placed and developed gross hematuria.  She is on Eliquis twice a day however this was placed on hold since 1/29.  CT scan on this admission revealed nonobstructing punctate right renal calculus.  Stable bilateral renal cysts with unremarkable bladde       Review of Systems   Constitutional:  Negative for fever.   Genitourinary:  Negative for hematuria.       Objective:    Vitals: Blood pressure 168/83, pulse 91, temperature 98.5 °F (36.9 °C), resp. rate 17, height 5' 3\" (1.6 m), weight 113 kg (250 lb 3.6 oz), SpO2 99%.,Body mass index is 44.32 kg/m².  INS & OUTS:  I/O last 24 hours:  In: 50 [IV Piggyback:50]  Out: 2085 [Urine:2085]    Physical Exam  Vitals and nursing note reviewed.   Constitutional:       " General: She is not in acute distress.     Appearance: Normal appearance. She is obese. She is ill-appearing. She is not toxic-appearing.   HENT:      Head: Normocephalic.   Cardiovascular:      Rate and Rhythm: Normal rate.   Pulmonary:      Effort: Pulmonary effort is normal. No respiratory distress.   Abdominal:      General: Abdomen is flat. There is no distension.   Genitourinary:     Comments: Corey catheter draining light tiffanie urine.  Musculoskeletal:         General: No swelling.   Skin:     General: Skin is warm and dry.   Neurological:      General: No focal deficit present.      Mental Status: She is lethargic.        Media Information    Document Information    Clinical Image - Mobile Device      01/30/2024 10:04   Attached To:   Hospital Encounter on 1/22/24   Source Information    AZIZA Moreau  [facility]       Imaging:  CT CHEST, ABDOMEN AND PELVIS WITHOUT IV CONTRAST     INDICATION: Sepsis and abdominal pain.     COMPARISON: 7/10/2022.     TECHNIQUE: CT examination of the chest, abdomen and pelvis was performed without intravenous contrast. Multiplanar 2D reformatted images were created from the source data.     This examination, like all CT scans performed in the [facility], was performed utilizing techniques to minimize radiation dose exposure, including the use of iterative reconstruction and automated exposure control. Radiation dose length   product (DLP) for this visit: 1954.07 mGy-cm     Enteric Contrast: Not administered.     FINDINGS:     CHEST     LUNGS: Significant respiratory motion artifact. No evidence of airspace consolidation or pulmonary edema. Probable vessel visualized on the prior exam in the right lower lobe. No evidence of pulmonary nodule no tracheal or endobronchial lesion.     PLEURA: No effusion.     HEART/GREAT VESSELS: Stable mild cardiomegaly. No thoracic aortic aneurysm     MEDIASTINUM AND MICHAEL: No lymphadenopathy.     CHEST WALL AND LOWER NECK:  Unremarkable.     ABDOMEN     LIVER/BILIARY TREE: Hepatic steatosis and hepatomegaly.     GALLBLADDER: No calcified gallstones. No pericholecystic inflammatory change.     SPLEEN: Unremarkable.     PANCREAS: Partial fatty replacement.     ADRENAL GLANDS: Unremarkable.     KIDNEYS/URETERS: Stable bilateral renal cysts. Punctate nonobstructing right renal calculus. No obstructive uropathy.     STOMACH AND BOWEL: Large amount of stool throughout the colon. No evidence of bowel obstruction, colitis or diverticulitis..     APPENDIX: No findings to suggest appendicitis.     ABDOMINOPELVIC CAVITY: No ascites. No pneumoperitoneum. No lymphadenopathy.     VESSELS: No abdominal aortic aneurysm.     PELVIS     REPRODUCTIVE ORGANS: No pelvic mass or fluid.     URINARY BLADDER: Unremarkable.     ABDOMINAL WALL/INGUINAL REGIONS: Unremarkable.     BONES: Advanced disc degenerative change in the lower thoracic and lumbar spine.     IMPRESSION:        1. No evidence of acute cardiopulmonary process.  2. Findings compatible with constipation. No evidence of colitis, diverticulitis or bowel obstruction.              Workstation performed: [employer]  Imaging reviewed - both report and images personally reviewed.     Labs:  Recent Labs     01/28/24  0617 01/30/24  0542   WBC 6.40 6.10       Recent Labs     01/28/24  0617 01/30/24  0542   HGB 10.8* 10.2*     Recent Labs     01/28/24  0617 01/30/24  0542   HCT 36.3 33.9*     Recent Labs     01/28/24  0617 01/30/24  0542   CREATININE 1.36* 1.08  1.08     Lab Results   Component Value Date    HGB 10.2 (L) 01/30/2024    HCT 33.9 (L) 01/30/2024    WBC 6.10 01/30/2024     01/30/2024     Lab Results   Component Value Date    K 4.2 01/30/2024    K 4.2 01/30/2024     (H) 01/30/2024     (H) 01/30/2024    CO2 30 01/30/2024    CO2 28 01/30/2024    BUN 19 01/30/2024    BUN 22 01/30/2024    CREATININE 1.08 01/30/2024    CREATININE 1.08 01/30/2024    CALCIUM 8.7 01/30/2024     "CALCIUM 8.9 2024    GLUCOSE 123 2022       History:    Past Medical History:   Diagnosis Date    Abnormal thyroid function test     last assessed: 2015     Arthritis     Caries     last assessed: 2016     Continuous opioid dependence (HCC) 2021    Edema of right lower extremity     last assessed: 2015     GERD (gastroesophageal reflux disease)     Hypertension     Medicare annual wellness visit, subsequent 2021    Positive blood culture 3/11/2022    Sarcoid      Past Surgical History:   Procedure Laterality Date     SECTION      IR PICC PLACEMENT SINGLE LUMEN  2024    MULTIPLE TOOTH EXTRACTIONS N/A 2016    Procedure: Surgical extraction of teeth 2, 18, 19, 30, 31; incision and drainage of left subperiosteal abscess ;  Surgeon: Clara Cevallos DMD;  [facility]: [facility];  Service:      Family History   Problem Relation Age of Onset    Asthma Mother     Stroke Mother     No Known Problems Father     No Known Problems Sister     No Known Problems Brother     No Known Problems Maternal Grandmother     No Known Problems Maternal Grandfather     No Known Problems Paternal Grandmother     No Known Problems Paternal Grandfather     Diabetes Maternal Aunt     Breast cancer Cousin     Diabetes Cousin     Breast cancer Other      Social History     Socioeconomic History    Marital status: Single     Spouse name: None    Number of children: None    Years of education: None    Highest education level: None   Occupational History    Occupation: Retired: Worked for [employer]    Tobacco Use    Smoking status: Never    Smokeless tobacco: Never   Vaping Use    Vaping status: Never Used   Substance and Sexual Activity    Alcohol use: Not Currently    Drug use: No    Sexual activity: Not Currently   Other Topics Concern    None   Social History Narrative     noted in \"allscripts\"      Social Determinants of Health     Financial Resource Strain: Not on " file   Food Insecurity: No Food Insecurity (1/24/2024)    Hunger Vital Sign     Worried About Running Out of Food in the Last Year: Never true     Ran Out of Food in the Last Year: Never true   Transportation Needs: No Transportation Needs (1/24/2024)    PRAPARE - Transportation     Lack of Transportation (Medical): No     Lack of Transportation (Non-Medical): No   Physical Activity: Not on file   Stress: Not on file   Social Connections: Not on file   Intimate Partner Violence: Not on file   Housing Stability: Low Risk  (1/24/2024)    Housing Stability Vital Sign     Unable to Pay for Housing in the Last Year: No     Number of Places Lived in the Last Year: 1     Unstable Housing in the Last Year: No       The following portions of the patient's history were reviewed and updated as appropriate: allergies, current medications, past family history, past medical history, past social history, past surgical history and problem list    AZIZA Moreau  Date: 1/30/2024 Time: 12:29 PM

## 2024-01-30 NOTE — ASSESSMENT & PLAN NOTE
Continue prednisone 5 mg twice daily.  Restarted back on Plaquenil.  Discussed with rheumatology for the need for bolus steroids.  Recommended to monitor on home medication

## 2024-01-30 NOTE — ASSESSMENT & PLAN NOTE
By reviewing the records previously patient had a localized swelling of the left upper extremities.  Now with localized swelling of the right hand.  Doppler negative.  X-ray reviewed.  Concerning for arthropathy.  Discussed with rheumatology on-call.  Continue with outpatient regimen and monitor.  Elevate right upper extremity

## 2024-01-30 NOTE — ASSESSMENT & PLAN NOTE
Creatinine peaked at 2.7 on 1/25; this is prerenal in the setting of septic shock  Creatinine today is 1.36  Restarted back on the.  Recheck tomorrow

## 2024-01-30 NOTE — ASSESSMENT & PLAN NOTE
Septic shock secondary to staph bacteremia, cellulitis.  Transiently required vasopressor support in the ICU and stress dose steroids  Continue on IV cefazolin per ID recommendations  Monitor WBC count and fever curve-unfortunately unable to obtain a.m. labs.  We will recheck tomorrow  Monitor wounds clinically with local wound care  Follow-up repeat blood cultures-so far remained negative  Resumed home prednisone dose 5 mg twice daily

## 2024-01-30 NOTE — ASSESSMENT & PLAN NOTE
Currently rate controlled  Will restart home metoprolol at 12.5 mg twice daily  Patient with hematuria.  Hold Eliquis until the urine clears up.  Restart back on Eliquis once hematuria resolves

## 2024-01-30 NOTE — PLAN OF CARE
Problem: Prexisting or High Potential for Compromised Skin Integrity  Goal: Skin integrity is maintained or improved  Description: INTERVENTIONS:  - Identify patients at risk for skin breakdown  - Assess and monitor skin integrity  - Assess and monitor nutrition and hydration status  - Monitor labs   - Assess for incontinence   - Turn and reposition patient  - Assist with mobility/ambulation  - Relieve pressure over bony prominences  - Avoid friction and shearing  - Provide appropriate hygiene as needed including keeping skin clean and dry  - Evaluate need for skin moisturizer/barrier cream  - Collaborate with interdisciplinary team   - Patient/family teaching  - Consider wound care consult   Outcome: Progressing     Problem: PAIN - ADULT  Goal: Verbalizes/displays adequate comfort level or baseline comfort level  Description: Interventions:  - Encourage patient to monitor pain and request assistance  - Assess pain using appropriate pain scale  - Administer analgesics based on type and severity of pain and evaluate response  - Implement non-pharmacological measures as appropriate and evaluate response  - Consider cultural and social influences on pain and pain management  - Notify physician/advanced practitioner if interventions unsuccessful or patient reports new pain  Outcome: Progressing     Problem: INFECTION - ADULT  Goal: Absence or prevention of progression during hospitalization  Description: INTERVENTIONS:  - Assess and monitor for signs and symptoms of infection  - Monitor lab/diagnostic results  - Monitor all insertion sites, i.e. indwelling lines, tubes, and drains  - Monitor endotracheal if appropriate and nasal secretions for changes in amount and color  - Rena Lara appropriate cooling/warming therapies per order  - Administer medications as ordered  - Instruct and encourage patient and family to use good hand hygiene technique  - Identify and instruct in appropriate isolation precautions for  identified infection/condition  Outcome: Progressing  Goal: Absence of fever/infection during neutropenic period  Description: INTERVENTIONS:  - Monitor WBC    Outcome: Progressing     Problem: SAFETY ADULT  Goal: Patient will remain free of falls  Description: INTERVENTIONS:  - Educate patient/family on patient safety including physical limitations  - Instruct patient to call for assistance with activity   - Consult OT/PT to assist with strengthening/mobility   - Keep Call bell within reach  - Keep bed low and locked with side rails adjusted as appropriate  - Keep care items and personal belongings within reach  - Initiate and maintain comfort rounds  - Make Fall Risk Sign visible to staff  - Offer Toileting every 2 Hours, in advance of need  - Initiate/Maintain bed alarm  - Obtain necessary fall risk management equipment:   - Apply yellow socks and bracelet for high fall risk patients  - Consider moving patient to room near nurses station  Outcome: Progressing  Goal: Maintain or return to baseline ADL function  Description: INTERVENTIONS:  -  Assess patient's ability to carry out ADLs; assess patient's baseline for ADL function and identify physical deficits which impact ability to perform ADLs (bathing, care of mouth/teeth, toileting, grooming, dressing, etc.)  - Assess/evaluate cause of self-care deficits   - Assess range of motion  - Assess patient's mobility; develop plan if impaired  - Assess patient's need for assistive devices and provide as appropriate  - Encourage maximum independence but intervene and supervise when necessary  - Involve family in performance of ADLs  - Assess for home care needs following discharge   - Consider OT consult to assist with ADL evaluation and planning for discharge  - Provide patient education as appropriate  Outcome: Progressing  Goal: Maintains/Returns to pre admission functional level  Description: INTERVENTIONS:  - Perform AM-PAC 6 Click Basic Mobility/ Daily Activity  assessment daily.  - Set and communicate daily mobility goal to care team and patient/family/caregiver.   - Collaborate with rehabilitation services on mobility goals if consulted  - Perform Range of Motion 2 times a day.  - Reposition patient every 2 hours.  - Dangle patient 2 times a day  - Stand patient 2 times a day  - Ambulate patient 2 times a day  - Out of bed to chair 2 times a day   - Out of bed for meals 2 times a day  - Out of bed for toileting  - Record patient progress and toleration of activity level   Outcome: Progressing     Problem: DISCHARGE PLANNING  Goal: Discharge to home or other facility with appropriate resources  Description: INTERVENTIONS:  - Identify barriers to discharge w/patient and caregiver  - Arrange for needed discharge resources and transportation as appropriate  - Identify discharge learning needs (meds, wound care, etc.)  - Arrange for interpretive services to assist at discharge as needed  - Refer to Case Management Department for coordinating discharge planning if the patient needs post-hospital services based on physician/advanced practitioner order or complex needs related to functional status, cognitive ability, or social support system  Outcome: Progressing     Problem: Knowledge Deficit  Goal: Patient/family/caregiver demonstrates understanding of disease process, treatment plan, medications, and discharge instructions  Description: Complete learning assessment and assess knowledge base.  Interventions:  - Provide teaching at level of understanding  - Provide teaching via preferred learning methods  Outcome: Progressing     Problem: CARDIOVASCULAR - ADULT  Goal: Maintains optimal cardiac output and hemodynamic stability  Description: INTERVENTIONS:  - Monitor I/O, vital signs and rhythm  - Monitor for S/S and trends of decreased cardiac output  - Administer and titrate ordered vasoactive medications to optimize hemodynamic stability  - Assess quality of pulses, skin color  and temperature  - Assess for signs of decreased coronary artery perfusion  - Instruct patient to report change in severity of symptoms  Outcome: Progressing  Goal: Absence of cardiac dysrhythmias or at baseline rhythm  Description: INTERVENTIONS:  - Continuous cardiac monitoring, vital signs, obtain 12 lead EKG if ordered  - Administer antiarrhythmic and heart rate control medications as ordered  - Monitor electrolytes and administer replacement therapy as ordered  Outcome: Progressing     Problem: RESPIRATORY - ADULT  Goal: Achieves optimal ventilation and oxygenation  Description: INTERVENTIONS:  - Assess for changes in respiratory status  - Assess for changes in mentation and behavior  - Position to facilitate oxygenation and minimize respiratory effort  - Oxygen administered by appropriate delivery if ordered  - Initiate smoking cessation education as indicated  - Encourage broncho-pulmonary hygiene including cough, deep breathe, Incentive Spirometry  - Assess the need for suctioning and aspirate as needed  - Assess and instruct to report SOB or any respiratory difficulty  - Respiratory Therapy support as indicated  Outcome: Progressing     Problem: METABOLIC, FLUID AND ELECTROLYTES - ADULT  Goal: Electrolytes maintained within normal limits  Description: INTERVENTIONS:  - Monitor labs and assess patient for signs and symptoms of electrolyte imbalances  - Administer electrolyte replacement as ordered  - Monitor response to electrolyte replacements, including repeat lab results as appropriate  - Instruct patient on fluid and nutrition as appropriate  Outcome: Progressing  Goal: Fluid balance maintained  Description: INTERVENTIONS:  - Monitor labs   - Monitor I/O and WT  - Instruct patient on fluid and nutrition as appropriate  - Assess for signs & symptoms of volume excess or deficit  Outcome: Progressing  Goal: Glucose maintained within target range  Description: INTERVENTIONS:  - Monitor Blood Glucose as  ordered  - Assess for signs and symptoms of hyperglycemia and hypoglycemia  - Administer ordered medications to maintain glucose within target range  - Assess nutritional intake and initiate nutrition service referral as needed  Outcome: Progressing       Problem: Nutrition/Hydration-ADULT  Goal: Nutrient/Hydration intake appropriate for improving, restoring or maintaining nutritional needs  Description: Monitor and assess patient's nutrition/hydration status for malnutrition. Collaborate with interdisciplinary team and initiate plan and interventions as ordered.  Monitor patient's weight and dietary intake as ordered or per policy. Utilize nutrition screening tool and intervene as necessary. Determine patient's food preferences and provide high-protein, high-caloric foods as appropriate.     INTERVENTIONS:  - Monitor oral intake, urinary output, labs, and treatment plans  - Assess nutrition and hydration status and recommend course of action  - Evaluate amount of meals eaten  - Assist patient with eating if necessary   - Allow adequate time for meals  - Recommend/ encourage appropriate diets, oral nutritional supplements, and vitamin/mineral supplements  - Order, calculate, and assess calorie counts as needed  - Recommend, monitor, and adjust tube feedings and TPN/PPN based on assessed needs  - Assess need for intravenous fluids  - Provide specific nutrition/hydration education as appropriate  - Include patient/family/caregiver in decisions related to nutrition  Outcome: Progressing

## 2024-01-30 NOTE — PROGRESS NOTES
[facility]  Progress Note  Name: Bhavna Al I  MRN: [id number]  Unit/Bed#: [facility] 918-01 I Date of Admission: 1/22/2024   Date of Service: 1/29/2024 I Hospital Day: 6    Assessment/Plan   * Septic shock (HCC)  Assessment & Plan  Septic shock secondary to staph bacteremia, cellulitis.  Transiently required vasopressor support in the ICU and stress dose steroids  Continue on IV cefazolin per ID recommendations  Monitor WBC count and fever curve-unfortunately unable to obtain a.m. labs.  We will recheck tomorrow  Monitor wounds clinically with local wound care  Follow-up repeat blood cultures-so far remained negative  Resumed home prednisone dose 5 mg twice daily    Localized swelling on right hand  Assessment & Plan  By reviewing the records previously patient had a localized swelling of the left upper extremities.  Now with localized swelling of the right hand.  Doppler negative.  X-ray reviewed.  Concerning for arthropathy.  Discussed with rheumatology on-call.  Continue with outpatient regimen and monitor.  Elevate right upper extremity    Hematuria  Assessment & Plan  Hematuria noted overnight.  Continue with irrigation.  If the hematuria does not clear up will obtain urology evaluation-    Encephalopathy  Assessment & Plan  Likely toxic metabolic encephalopathy from the infectious process.  Nonfocal exam  Improving    Hypernatremia  Assessment & Plan  Secondary to decreased p.o. intake from dysphagia and modified diet  Diet has now been advanced to puréed/thin liquids which should help  Hypernatremia resolved  Patient with generalized edema.  Restarted back on torsemide and monitor    Chronic kidney disease  Assessment & Plan  Creatinine peaked at 2.7 on 1/25; this is prerenal in the setting of septic shock  Creatinine today is 1.36  Restarted back on the.  Recheck tomorrow    Dermatitis associated with incontinence  Assessment & Plan  Patient with dermatitis.  Continue  Eucerin.  In order to avoid further skin infection possibly portal of entry being sites of skin breakdown, will put Corey catheter-  Voiding trial once the skin lesions are desquamated    Ambulatory dysfunction  Assessment & Plan  Resides in SNF for rehabilitation therapy  Appreciate case management consult    Chronic diastolic heart failure (HCC)  Assessment & Plan  Not in exacerbation  Home Lasix on hold given volume issues  Patient is off of fluids.  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  Hold Eliquis until the urine clears up.  Restart back on Eliquis once hematuria resolves    Staphylococcal scalded skin syndrome  Assessment & Plan   superimposed on pustular psoriasis  Continue IV cefazolin as above  Follow-up ID recommendations  Monitor WBC count and fever curve-remained afebrile.    Skin desquamation noted      Benign essential hypertension  Assessment & Plan  Monitor on low-dose metoprolol    Rheumatoid arthritis (HCC)  Assessment & Plan  Continue prednisone 5 mg twice daily.  Restarted back on Plaquenil.  Discussed with rheumatology for the need for bolus steroids.  Recommended to monitor on home medication               VTE Pharmacologic Prophylaxis:   eliquis - on hold due to hematuria  Mobility:   Basic Mobility Inpatient Raw Score: 6  -HL Goal: 2: Bed activities/Dependent transfer  -HL Achieved: 2: Bed activities/Dependent transfer      Patient Centered Rounds: I performed bedside rounds with nursing staff today.   Discussions with Specialists or Other Care Team Provider: urology and infectious  disease  Rheumatology    Education and Discussions with Family / Patient: Updated  (daughter) at bedside.    Total Time Spent on Date of Encounter in care of patient: 35 mins. This time was spent on one or more of the following: performing physical exam; counseling and coordination of care; obtaining or reviewing history; documenting in the medical record; reviewing/ordering tests, medications or procedures; communicating with other healthcare professionals and discussing with patient's family/caregivers.    Current Length of Stay: 6 day(s)  Current Patient Status: Inpatient   Certification Statement: The patient will continue to require additional inpatient hospital stay due to sepsis  Discharge Plan: Anticipate discharge in 48-72 hrs to rehab facility.    Code Status: Level 1 - Full Code    Subjective:   Patient seen and examined.  Discussed with nursing.  Patient still with poor p.o. intake.  Was febrile earlier today with encephalopathy.  Status post PICC line placement by interventional radiology.  Discussed with rheumatology.  Continue with Plaquenil and home dose of steroids for now    Objective:     Vitals:   Temp (24hrs), Av.9 °F (37.7 °C), Min:99 °F (37.2 °C), Max:100.7 °F (38.2 °C)    Temp:  [99 °F (37.2 °C)-100.7 °F (38.2 °C)] 100.7 °F (38.2 °C)  HR:  [] 117  Resp:  [20-22] 20  BP: (115-149)/(79-81) 149/81  SpO2:  [91 %-96 %] 91 %  Body mass index is 44.68 kg/m².     Input and Output Summary (last 24 hours):     Intake/Output Summary (Last 24 hours) at 2024  Last data filed at 2024 1730  Gross per 24 hour   Intake 50 ml   Output 1590 ml   Net -1540 ml       Physical Exam:   Physical Exam  Constitutional:       General: She is not in acute distress.     Appearance: She is not ill-appearing.   HENT:      Head: Normocephalic.      Nose: Nose normal.   Eyes:      General: No scleral icterus.  Cardiovascular:      Rate and Rhythm: Normal rate and regular rhythm.       Heart sounds: No murmur heard.  Pulmonary:      Effort: Pulmonary effort is normal. No respiratory distress.   Abdominal:      General: There is no distension.      Tenderness: There is no abdominal tenderness.   Musculoskeletal:         General: Swelling and tenderness present.      Comments: Right upper extremity swelling and tenderness   Skin:     General: Skin is warm.   Neurological:      Mental Status: She is alert. Mental status is at baseline.          Additional Data:     Labs:  Results from last 7 days   Lab Units 01/28/24  0617   WBC Thousand/uL 6.40   HEMOGLOBIN g/dL 10.8*   HEMATOCRIT % 36.3   PLATELETS Thousands/uL 124*   NEUTROS PCT % 79*   LYMPHS PCT % 11*   MONOS PCT % 7   EOS PCT % 2     Results from last 7 days   Lab Units 01/28/24  0617 01/27/24  0644 01/26/24  0517   SODIUM mmol/L 146   < > 148*  146   POTASSIUM mmol/L 3.4*   < > 3.3*  3.5   CHLORIDE mmol/L 110*   < > 110*  109*   CO2 mmol/L 29   < > 25  23   BUN mg/dL 27*   < > 30*  30*   CREATININE mg/dL 1.36*   < > 2.02*  2.02*   ANION GAP mmol/L 7   < > 13  14   CALCIUM mg/dL 9.2   < > 9.1  8.9   ALBUMIN g/dL  --   --  3.3*   TOTAL BILIRUBIN mg/dL  --   --  0.36   ALK PHOS U/L  --   --  37   ALT U/L  --   --  10   AST U/L  --   --  32   GLUCOSE RANDOM mg/dL 74   < > 91  90    < > = values in this interval not displayed.     Results from last 7 days   Lab Units 01/25/24 2002   INR  2.02*     Results from last 7 days   Lab Units 01/29/24  1808 01/29/24  1418 01/29/24  1248 01/29/24  1138 01/29/24  0824 01/29/24  0619 01/28/24  1753 01/28/24  1138 01/28/24  0716 01/28/24  0042 01/27/24  1815 01/27/24  1203   POC GLUCOSE mg/dl 86 100 69 76 71 73 83 78 76 109 107 86         Results from last 7 days   Lab Units 01/25/24 2002 01/25/24  0815 01/24/24  1216 01/24/24  0828 01/24/24  0250 01/23/24  0232 01/23/24  0013   LACTIC ACID mmol/L 1.5  --  2.4* 2.7*  --  2.3* 2.3*   PROCALCITONIN ng/ml  --  10.15*  --   --  3.29*  --  0.17        Lines/Drains:  Invasive Devices       Peripherally Inserted Central Catheter Line  Duration             PICC Line 01/29/24 <1 day              Peripheral Intravenous Line  Duration             Peripheral IV 01/24/24 Dorsal (posterior);Left Hand 5 days              Drain  Duration             Urethral Catheter Straight-tip 18 Fr. <1 day                  Urinary Catheter:  Goal for removal: Voiding trial once desquamation is complete         Central Line:  Goal for removal: Port accessed. Will de-access as appropriate. N/A - Discharging with PICC for IV ABX/medications             Imaging: No pertinent imaging reviewed.    Recent Cultures (last 7 days):   Results from last 7 days   Lab Units 01/25/24  0535 01/23/24  0013   BLOOD CULTURE  No Growth After 4 Days.  No Growth After 4 Days. Staphylococcus aureus*  Staphylococcus lugdunensis*  Staphylococcus capitis*   GRAM STAIN RESULT   --  Gram positive cocci in clusters*  Gram positive cocci in clusters*       Last 24 Hours Medication List:   Current Facility-Administered Medications   Medication Dose Route Frequency Provider Last Rate    acetaminophen  650 mg Oral Q4H PRN Valentine Brewer MD      albuterol  2 puff Inhalation Q6H PRN Pepe Do MD      cefazolin  2,000 mg Intravenous Q8H AZIZA Wolf 2,000 mg (01/29/24 1449)    chlorhexidine  15 mL Mouth/Throat Q12H [facility] AZIZA Wolf      cholecalciferol  1,000 Units Oral Daily Pepe Do MD      docusate sodium  100 mg Oral BID PRN Pepe Do MD      furosemide  40 mg Oral Daily Valentine Brewer MD      hydrocortisone sodium succinate  25 mg Intravenous Q12H [facility] Umesh Kendrick, DO      hydroxychloroquine  200 mg Oral BID Valentine Brewer MD      metoprolol  5 mg Intravenous Q6H PRN AZIZA Wolf      metoprolol tartrate  12.5 mg Oral Q12H [facility] Issac Minor, DO      ondansetron  4 mg Intravenous Q6H PRN Pepe Do MD      pantoprazole  40 mg Oral  Early Morning AZIZA Wolf      polyethylene glycol  17 g Oral Daily Valentine Brewer MD      senna  2 tablet Oral HS Valentine Brewer MD      white petrolatum-mineral oil   Topical TID Pepe Do MD          Today, Patient Was Seen By: Valentine Brewer MD    **Please Note: This note may have been constructed using a voice recognition system.**

## 2024-01-30 NOTE — PROGRESS NOTES
"[facility]  Progress Note  Name: Bhavna Al I  MRN: [id number]  Unit/Bed#: [facility]PHP 918-01 I Date of Admission: 1/22/2024   Date of Service: 1/30/2024 I Hospital Day: 7      Assessment & Plan:    * Septic shock (HCC)  Assessment & Plan  Septic shock now resolved - due to staphylococcal bacteremia and subsequent cellulitis   Transiently required vasopressor support in the ICU and stress dose steroids, now stabilized and transferred to the medical floor  Continue on IV Ancef per ID recommendations  Monitor vitals and maintain hemodynamics  Repeat blood cultures from 1/25 are negative - initial cultures grew multi-strain staphylococcal bacteremia - echocardiogram negative for evidence of endocarditis - see plan for staphylococcal infection below  Procalcitonin trend: 10.15 peak -> 1.03     Localized swelling on right hand  Assessment & Plan  By reviewing the records previously, patient had a localized swelling of the left upper extremities.  Now with localized swelling of the right hand.  Doppler negative.   XR of hand on 1/28 noting: \"joint space widening and possible erosions involving the first interphalangeal joint. There is also suggestion of soft tissue swelling at the first phalanx. Cannot exclude septic arthropathy in the appropriate clinical setting \"  Previous provider discussed with rheumatology -> recommended to continue with outpatient regimen and monitor  Personally discussed with ID -> if clinically worsens, should pursue and surgery consultation, especially if concern/suspicion increases regarding septic arthropathy     Hematuria  Assessment & Plan  Hematuria currently resolved s/p irrigation  Appreciate urology input  Resume anticoagulation (Eliquis) in the upcoming days with consistent evidence of resolved hematuria     Encephalopathy  Assessment & Plan  Likely toxic metabolic encephalopathy due to septic shock  Progressively improved  Limit/avoid sedating agents as " possible  Continue neurochecks     Hypernatremia  Assessment & Plan  Likely secondary to decreased oral intake secondary to altered mentation and dysphagia  Oral diet modified to a puréed consistency with thin liquids -> encourage increased oral intake as recently possible in the setting of encephalopathy  Serum sodium of 150 today -> will initiate D5-1/2 NS infusion and continue to monitor -> hold diuretics while on IV fluids and monitor for iatrogenic any fluid overload     Acute kidney injury superimposed on chronic kidney disease stage 3 (HCC)  Assessment & Plan  Baseline creatinine of approximately 1.5-1.6 prior peak of 4.0 -> progressively improved currently at 1.08  Monitor renal function and urine output - limit/avoid nephrotoxins and hypotension as possible  Restarted on IV fluids, hence, diuretics held     Dermatitis associated with incontinence  Assessment & Plan  Patient with dermatitis.  Continue Eucerin.  In order to avoid further skin infection possibly portal of entry being sites of skin breakdown, Corey catheter in place  Voiding trial once the skin lesions are desquamated     Ambulatory dysfunction  Assessment & Plan  Resides in SNF for rehabilitation therapy  Appreciate case management consult     Chronic diastolic heart failure (HCC)  Assessment & Plan  Diuretics currently held in the setting of hyponatremia requiring IV fluid hydration for intravascular volume depletion - monitor for fluid overload  On beta-blockade with Lopressor     Pustular psoriasis  Assessment & Plan  Known history of biopsy-proven pustular psoriasis  Appreciate dermatology input -> continue current Solu-Cortef regimen  Patient planning by dermatology for biologic treatment     Paroxysmal atrial fibrillation (HCC)  Assessment & Plan  Rate controlled on Lopressor  Anticipate resuming anticoagulation (Eliquis) with consistent of resolved hematuria     Staphylococcal scalded skin syndrome  Assessment & Plan  Superimposed on  possible psoriasis  Continue IV Ancef per infectious disease  Initial blood cultures from  grew both MSSA, Staphylococcus lugdunensis, and Staphylococcus capitis - repeat blood cultures negative  Monitor skin desquamation - appreciate prior dermatology input     Essential hypertension  Assessment & Plan  Continue Lopressor     Rheumatoid arthritis (HCC)  Assessment & Plan  Continue Prednisone/Plaquenil  Outpatient rheumatology follow-up  Condition likely contributory to hand swelling    Morbid obesity (HCC)  Assessment & Plan  BMI of 44.32  Lifestyle/diet modifications      DVT Prophylaxis:  SCDs - holding Eliquis    AM-PAC Basic Mobility:  Basic Mobility Inpatient Raw Score: 6  -HLM Achieved: 2: Bed activities/Dependent transfer  -HL Goal: 2: Bed activities/Dependent transfer    Patient Centered Rounds:  I have performed bedside rounds and discussed plan of care with nursing today.  Discussions with Specialists or Other Care Team Provider:  see above assessments if applicable    Education and Discussions with Family / Patient:  Discussed with daughter, over the phone today    Time Spent for Care:  35 minutes. More than 50% of total time spent on counseling and coordination of care, on one or more of the following: performing physical exam; counseling and coordination of care, obtaining or reviewing history, documenting in the medical record, reviewing/ordering tests/medications/procedures, and communicating with other healthcare professionals.    Current Length of Stay: 7 day(s)  Current Patient Status: Inpatient   Certification Statement:  Patient will continue to require additional hospital stay due to assessments as noted above.    Code Status: Level 1 - Full Code        Subjective:     Encountered earlier in the day.  Remains weak and fatigued.  Not too talkative this morning.  Does not seem to be in active distress, at this time.        Objective:     Vitals:   Temp (24hrs), Av °F (37.2 °C),  Min:98.5 °F (36.9 °C), Max:99.2 °F (37.3 °C)    Temp:  [98.5 °F (36.9 °C)-99.2 °F (37.3 °C)] 98.9 °F (37.2 °C)  HR:  [] 80  Resp:  [16-20] 16  BP: (140-168)/(65-83) 140/65  SpO2:  [95 %-99 %] 98 %  Body mass index is 44.32 kg/m².     Input and Output Summary (last 24 hours):       Intake/Output Summary (Last 24 hours) at 1/30/2024 1840  Last data filed at 1/30/2024 1036  Gross per 24 hour   Intake --   Output 895 ml   Net -895 ml       Physical Exam:     GENERAL Weak/fatigued - obese   HEAD   Normocephalic - atraumatic   EYES   PERRL - EOMI    MOUTH   Mucosa moist   NECK   Supple - full range of motion   CARDIAC Rate controlled - S1/S2 positive   PULMONARY    Mildly diminished at the bases with decreased respiratory effort due to body habitus - nonlabored respirations at rest   ABDOMEN   Soft - nontender/nondistended - active bowel sounds   MUSCULOSKELETAL   Motor strength/range of motion markedly deconditioned - bilateral hand swelling (R>L)   NEUROLOGIC Sluggish in response currently   SKIN   Chronic wrinkles/blemishes - diffuse areas of pustular psoriasis and superimposed patches of the desquamation   PSYCHIATRIC   Mood/affect somewhat flat         Additional Data:     Labs & Recent Cultures:    Results from last 7 days   Lab Units 01/30/24  0542   WBC Thousand/uL 6.10   HEMOGLOBIN g/dL 10.2*   HEMATOCRIT % 33.9*   PLATELETS Thousands/uL 203   NEUTROS PCT % 84*   LYMPHS PCT % 7*   MONOS PCT % 7   EOS PCT % 1     Results from last 7 days   Lab Units 01/30/24  0542   POTASSIUM mmol/L 4.2  4.2   CHLORIDE mmol/L 111*  111*   CO2 mmol/L 30  28   BUN mg/dL 19  22   CREATININE mg/dL 1.08  1.08   CALCIUM mg/dL 8.7  8.9   ALK PHOS U/L 67   ALT U/L <3*   AST U/L 50*     Results from last 7 days   Lab Units 01/25/24 2002   INR  2.02*     Results from last 7 days   Lab Units 01/30/24  1738 01/30/24  1531 01/30/24  1206 01/30/24  0613 01/30/24  0123 01/30/24  0017 01/29/24  1808 01/29/24  1418 01/29/24  1248  01/29/24  1138 01/29/24  0824 01/29/24  0619   POC GLUCOSE mg/dl 110 129 82 85 93 73 86 100 69 76 71 73         Results from last 7 days   Lab Units 01/30/24  0542 01/25/24 2002 01/25/24  0815 01/24/24  1216 01/24/24  0828 01/24/24  0250   LACTIC ACID mmol/L  --  1.5  --  2.4* 2.7*  --    PROCALCITONIN ng/ml 1.03*  --  10.15*  --   --  3.29*         Results from last 7 days   Lab Units 01/25/24  0535   BLOOD CULTURE  No Growth After 5 Days.  No Growth After 5 Days.         Lines/Drains:  Invasive Devices       Peripherally Inserted Central Catheter Line  Duration             PICC Line 01/29/24 1 day              Drain  Duration             Urethral Catheter Straight-tip 18 Fr. 1 day                      Last 24 Hours Medication List:   Current Facility-Administered Medications   Medication Dose Route Frequency Provider Last Rate    acetaminophen  650 mg Oral Q4H PRN Valentine Brewer MD      albuterol  2 puff Inhalation Q6H PRN Pepe Do MD      cefazolin  2,000 mg Intravenous Q8H AZIZA Wolf 2,000 mg (01/30/24 1330)    chlorhexidine  15 mL Mouth/Throat Q12H [facility] AZIZA Wolf      cholecalciferol  1,000 Units Oral Daily Pepe Do MD      dextrose 5 % and sodium chloride 0.45 %  100 mL/hr Intravenous Continuous Ann Moe  mL/hr (01/30/24 1245)    docusate sodium  100 mg Oral BID PRN Pepe Do MD      hydrocortisone sodium succinate  25 mg Intravenous Q12H [facility] Umesh Kendrick, DO      hydroxychloroquine  200 mg Oral BID Valentine Brewer MD      metoprolol  5 mg Intravenous Q6H PRN AZIZA Wolf      metoprolol tartrate  12.5 mg Oral Q12H [facility] Issac Minor, DO      ondansetron  4 mg Intravenous Q6H PRN Pepe Do MD      pantoprazole  40 mg Oral Early Morning AZIZA Wolf      polyethylene glycol  17 g Oral Daily Valentine Brewer MD      senna  2 tablet Oral HS Valentine Brewer MD      white petrolatum-mineral oil   Topical TID Pepe  MD RIMMA Lopez MD   Hospitalist - [facility]        ** Please Note: This note is constructed using a voice recognition dictation system.  An occasional wrong word/phrase or “sound-a-like” substitution may have been picked up by dictation device due to the inherent limitations of voice recognition software.  Read the chart carefully and recognize, using reasonable context, where substitutions may have occurred.**

## 2024-01-30 NOTE — ASSESSMENT & PLAN NOTE
superimposed on pustular psoriasis  Continue IV cefazolin as above  Follow-up ID recommendations  Monitor WBC count and fever curve-remained afebrile.    Skin desquamation noted

## 2024-01-30 NOTE — PROGRESS NOTES
Progress Note - Infectious Disease   Bhavna JANUSZ Al 73 y.o. female MRN: [id number]  Unit/Bed#: [facility] 918-01 Encounter: [id number]      Impression/Plan:  1.  Sepsis.  Appears to be secondary to Staphylococcus aureus and Staphylococcus lugdunensis bacteremia.  The patient does have significant rash with areas of potential cutaneous breakdown that could lead to a transient bacteremia.  Consideration for the possibility of staph scalded skin syndrome.  The patient remains hemodynamically stable despite her systemic illness.  Procalcitonin level has been quite high.  Still with intermittent low-grade fever which may not be infection related but rather inflammatory related to the rheumatoid arthritis.  Repeat procalcitonin level has decreased  -Antibiotics as below  -Follow-up blood cultures  -Recheck CBC with differential and CMP to make sure no developing antibiotic associated toxicities or worsening infection  -Additional workup as below  -Recheck procalcitonin level  -Recheck sedimentation rate and CRP     2.  MSSA and Staphylococcus lugdunensis bacteremia.  Suspected cutaneous translocation in the setting of advanced psoriasis.  Consideration for the possibility of endovascular infection.  Consideration for the possibility of staph scalded skin syndrome.  Poor windows on transthoracic echocardiogram but no valvular vegetation reported.  Repeat blood cultures are negative  -Continue cefazolin through 2/22/2024 to complete 4 weeks from the first negative blood culture  -Follow-up repeat blood cultures  -Additional workup as needed  -Remove PICC line after last dose of IV antibiotics  -Check CBC with differential and creatinine weekly while on the cefazolin     3.  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  The patient has desquamating but the lesions appear to be drying and crusting.  As patient is improved and no longer requiring pressor support, will not add agents such as linezolid to suppress toxin  production.  -Antibiotics as above  -Dermatology follow-up  -Serial exams     4.  Acute encephalopathy.  In the setting of sepsis from bacteremia.  No headache or stiff neck to suggest a primary CNS infection.  Suspect multifactorial in this patient with baseline cognitive issues.  Clinically improved.  -Monitor cognition  -Antibiotics as above for now  -Additional workup as needed     5.  Psoriasis.  Not as active as in the past.  Has been evaluated by dermatology  -Outpatient dermatology follow-up     6.  Atrial fibrillation.  With a rapid ventricular response.  On rate control and anticoagulation     7.  Acute kidney injury.  Complicating chronic kidney disease.  Stage III.  Suspect prerenal issues playing a significant role.  Consideration for the possibility of sepsis playing a role.  Renal function is improving.  -Recheck BMP  -Dose adjusted antibiotics  -Volume management     8.  Rheumatoid arthritis.  On low-dose prednisone and hydroxychloroquine which had been held.  Seems to be flaring as far as joint disease.  The right hand however is more prominent than the left hand as far as swelling and involvement.  -Consider hand surgery evaluation  -Continue restarted steroids and Plaquenil  -May need joint aspiration right hand    Discussed with the primary service the plan to continue the cefazolin for now and consideration for hand surgery evaluation.  They agree with the plan.    Antibiotics:  Cefazolin 7  Antibiotics 8  Negative blood cultures 5    Subjective:  Patient has low-grade fever; no nausea, vomiting, diarrhea; no cough, shortness of breath; still having significant hand pain right greater than left. No new symptoms.  Not requiring any oxygen support.  Restarted on steroids and Plaquenil yesterday for the rheumatoid arthritis    Objective:  Vitals:  Temp:  [98.5 °F (36.9 °C)-100.7 °F (38.2 °C)] 98.5 °F (36.9 °C)  HR:  [] 84  Resp:  [17-20] 17  BP: (147-149)/(81) 147/81  SpO2:  [91 %-98 %] 95  %  Temp (24hrs), Av.4 °F (37.4 °C), Min:98.5 °F (36.9 °C), Max:100.7 °F (38.2 °C)  Current: Temperature: 98.5 °F (36.9 °C)    Physical Exam:   General Appearance:  Alert, interactive, nontoxic, no acute distress.   Throat: Oropharynx moist without lesions.    Lungs:   Clear to auscultation bilaterally; no wheezes, rhonchi or rales; respirations unlabored   Heart:  RRR; no murmur, rub or gallop   Abdomen:   Soft, non-tender, non-distended, positive bowel sounds.     Extremities: No clubbing, cyanosis.  Right greater than left right hand swelling and tenderness with decreased range of motion   Skin: No new rashes or lesions. No draining wounds noted.       Labs, Imaging, & Other studies:   All pertinent labs and imaging studies were personally reviewed  Results from last 7 days   Lab Units 24  0542 24  0617 24  0644   WBC Thousand/uL 6.10 6.40 5.43   HEMOGLOBIN g/dL 10.2* 10.8* 10.4*   PLATELETS Thousands/uL 203 124* 122*     Results from last 7 days   Lab Units 24  0542 24  0617 24  0644 24  0517 24  0815   SODIUM mmol/L 150*  149* 146 142  142 148*  146 145   POTASSIUM mmol/L 4.2  4.2 3.4* 3.3*  3.3* 3.3*  3.5 4.4   CHLORIDE mmol/L 111*  111* 110* 107  106 110*  109* 108   CO2 mmol/L 30  28 29 28  28 25  23 23   BUN mg/dL 19  22 27* 27*  27* 30*  30* 28*   CREATININE mg/dL 1.08  1.08 1.36* 1.68*  1.66* 2.02*  2.02* 2.14*   EGFR ml/min/1.73sq m 50  50 38 29  29 23  23 22   CALCIUM mg/dL 8.7  8.9 9.2 9.2  9.3 9.1  8.9 8.4   AST U/L 50*  --   --  32 41*   ALT U/L <3*  --   --  10 19   ALK PHOS U/L 67  --   --  37 33*     Results from last 7 days   Lab Units 24  0535   BLOOD CULTURE  No Growth After 4 Days.  No Growth After 4 Days.     Results from last 7 days   Lab Units 24  0542 24  0815 24  0250   PROCALCITONIN ng/ml 1.03* 10.15* 3.29*     Results from last 7 days   Lab Units 24  0542   CRP mg/L 192.7*            X-ray right hand.  Joint space widening and possible erosions first interphalangeal joint.  Soft tissue swelling first phalanx.    Images personally reviewed by me in PACS

## 2024-01-30 NOTE — ASSESSMENT & PLAN NOTE
Secondary to decreased p.o. intake from dysphagia and modified diet  Diet has now been advanced to puréed/thin liquids which should help  Hypernatremia resolved  Patient with generalized edema.  Restarted back on torsemide and monitor

## 2024-01-30 NOTE — PLAN OF CARE
Problem: Prexisting or High Potential for Compromised Skin Integrity  Goal: Skin integrity is maintained or improved  Description: INTERVENTIONS:  - Identify patients at risk for skin breakdown  - Assess and monitor skin integrity  - Assess and monitor nutrition and hydration status  - Monitor labs   - Assess for incontinence   - Turn and reposition patient  - Assist with mobility/ambulation  - Relieve pressure over bony prominences  - Avoid friction and shearing  - Provide appropriate hygiene as needed including keeping skin clean and dry  - Evaluate need for skin moisturizer/barrier cream  - Collaborate with interdisciplinary team   - Patient/family teaching  - Consider wound care consult   Outcome: Progressing     Problem: PAIN - ADULT  Goal: Verbalizes/displays adequate comfort level or baseline comfort level  Description: Interventions:  - Encourage patient to monitor pain and request assistance  - Assess pain using appropriate pain scale  - Administer analgesics based on type and severity of pain and evaluate response  - Implement non-pharmacological measures as appropriate and evaluate response  - Consider cultural and social influences on pain and pain management  - Notify physician/advanced practitioner if interventions unsuccessful or patient reports new pain  Outcome: Progressing     Problem: INFECTION - ADULT  Goal: Absence or prevention of progression during hospitalization  Description: INTERVENTIONS:  - Assess and monitor for signs and symptoms of infection  - Monitor lab/diagnostic results  - Monitor all insertion sites, i.e. indwelling lines, tubes, and drains  - Monitor endotracheal if appropriate and nasal secretions for changes in amount and color  - [place] appropriate cooling/warming therapies per order  - Administer medications as ordered  - Instruct and encourage patient and family to use good hand hygiene technique  - Identify and instruct in appropriate isolation precautions for  identified infection/condition  Outcome: Progressing     Problem: DISCHARGE PLANNING  Goal: Discharge to home or other facility with appropriate resources  Description: INTERVENTIONS:  - Identify barriers to discharge w/patient and caregiver  - Arrange for needed discharge resources and transportation as appropriate  - Identify discharge learning needs (meds, wound care, etc.)  - Arrange for interpretive services to assist at discharge as needed  - Refer to Case Management Department for coordinating discharge planning if the patient needs post-hospital services based on physician/advanced practitioner order or complex needs related to functional status, cognitive ability, or social support system  Outcome: Progressing

## 2024-01-31 LAB
ALBUMIN SERPL BCP-MCNC: 2.6 G/DL (ref 3.5–5)
ALP SERPL-CCNC: 59 U/L (ref 34–104)
ALT SERPL W P-5'-P-CCNC: <3 U/L (ref 7–52)
ANION GAP SERPL CALCULATED.3IONS-SCNC: 7 MMOL/L
AST SERPL W P-5'-P-CCNC: 38 U/L (ref 13–39)
BASOPHILS # BLD AUTO: 0 THOUSANDS/ÂΜL (ref 0–0.1)
BASOPHILS # BLD AUTO: 0.01 THOUSANDS/ÂΜL (ref 0–0.1)
BASOPHILS NFR BLD AUTO: 0 % (ref 0–1)
BASOPHILS NFR BLD AUTO: 0 % (ref 0–1)
BILIRUB SERPL-MCNC: 0.32 MG/DL (ref 0.2–1)
BUN SERPL-MCNC: 19 MG/DL (ref 5–25)
CALCIUM ALBUM COR SERPL-MCNC: 9.4 MG/DL (ref 8.3–10.1)
CALCIUM SERPL-MCNC: 8.3 MG/DL (ref 8.4–10.2)
CHLORIDE SERPL-SCNC: 110 MMOL/L (ref 96–108)
CO2 SERPL-SCNC: 30 MMOL/L (ref 21–32)
CREAT SERPL-MCNC: 1.03 MG/DL (ref 0.6–1.3)
CRP SERPL QL: 119.7 MG/L
EOSINOPHIL # BLD AUTO: 0.04 THOUSAND/ÂΜL (ref 0–0.61)
EOSINOPHIL # BLD AUTO: 0.07 THOUSAND/ÂΜL (ref 0–0.61)
EOSINOPHIL NFR BLD AUTO: 1 % (ref 0–6)
EOSINOPHIL NFR BLD AUTO: 2 % (ref 0–6)
ERYTHROCYTE [DISTWIDTH] IN BLOOD BY AUTOMATED COUNT: 14.9 % (ref 11.6–15.1)
ERYTHROCYTE [DISTWIDTH] IN BLOOD BY AUTOMATED COUNT: 15.1 % (ref 11.6–15.1)
ERYTHROCYTE [SEDIMENTATION RATE] IN BLOOD: 65 MM/HOUR (ref 0–29)
GFR SERPL CREATININE-BSD FRML MDRD: 53 ML/MIN/1.73SQ M
GLUCOSE SERPL-MCNC: 101 MG/DL (ref 65–140)
GLUCOSE SERPL-MCNC: 113 MG/DL (ref 65–140)
GLUCOSE SERPL-MCNC: 113 MG/DL (ref 65–140)
GLUCOSE SERPL-MCNC: 119 MG/DL (ref 65–140)
GLUCOSE SERPL-MCNC: 120 MG/DL (ref 65–140)
GLUCOSE SERPL-MCNC: 122 MG/DL (ref 65–140)
HCT VFR BLD AUTO: 32.7 % (ref 34.8–46.1)
HCT VFR BLD AUTO: 33.7 % (ref 34.8–46.1)
HEMOCCULT STL QL: NEGATIVE
HEMOCCULT STL QL: NORMAL
HEMOCCULT STL QL: NORMAL
HGB BLD-MCNC: 10.1 G/DL (ref 11.5–15.4)
HGB BLD-MCNC: 9.8 G/DL (ref 11.5–15.4)
IMM GRANULOCYTES # BLD AUTO: 0.03 THOUSAND/UL (ref 0–0.2)
IMM GRANULOCYTES # BLD AUTO: 0.03 THOUSAND/UL (ref 0–0.2)
IMM GRANULOCYTES NFR BLD AUTO: 1 % (ref 0–2)
IMM GRANULOCYTES NFR BLD AUTO: 1 % (ref 0–2)
LYMPHOCYTES # BLD AUTO: 0.5 THOUSANDS/ÂΜL (ref 0.6–4.47)
LYMPHOCYTES # BLD AUTO: 0.64 THOUSANDS/ÂΜL (ref 0.6–4.47)
LYMPHOCYTES NFR BLD AUTO: 12 % (ref 14–44)
LYMPHOCYTES NFR BLD AUTO: 14 % (ref 14–44)
MCH RBC QN AUTO: 27.1 PG (ref 26.8–34.3)
MCH RBC QN AUTO: 27.7 PG (ref 26.8–34.3)
MCHC RBC AUTO-ENTMCNC: 29.1 G/DL (ref 31.4–37.4)
MCHC RBC AUTO-ENTMCNC: 30.9 G/DL (ref 31.4–37.4)
MCV RBC AUTO: 90 FL (ref 82–98)
MCV RBC AUTO: 93 FL (ref 82–98)
MONOCYTES # BLD AUTO: 0.36 THOUSAND/ÂΜL (ref 0.17–1.22)
MONOCYTES # BLD AUTO: 0.4 THOUSAND/ÂΜL (ref 0.17–1.22)
MONOCYTES NFR BLD AUTO: 9 % (ref 4–12)
MONOCYTES NFR BLD AUTO: 9 % (ref 4–12)
NEUTROPHILS # BLD AUTO: 3.26 THOUSANDS/ÂΜL (ref 1.85–7.62)
NEUTROPHILS # BLD AUTO: 3.4 THOUSANDS/ÂΜL (ref 1.85–7.62)
NEUTS SEG NFR BLD AUTO: 74 % (ref 43–75)
NEUTS SEG NFR BLD AUTO: 77 % (ref 43–75)
NRBC BLD AUTO-RTO: 0 /100 WBCS
NRBC BLD AUTO-RTO: 0 /100 WBCS
PLATELET # BLD AUTO: 188 THOUSANDS/UL (ref 149–390)
PLATELET # BLD AUTO: 196 THOUSANDS/UL (ref 149–390)
PMV BLD AUTO: 12.4 FL (ref 8.9–12.7)
PMV BLD AUTO: 12.4 FL (ref 8.9–12.7)
POTASSIUM SERPL-SCNC: 3.8 MMOL/L (ref 3.5–5.3)
PROCALCITONIN SERPL-MCNC: 0.61 NG/ML
PROT SERPL-MCNC: 5.7 G/DL (ref 6.4–8.4)
RBC # BLD AUTO: 3.62 MILLION/UL (ref 3.81–5.12)
RBC # BLD AUTO: 3.64 MILLION/UL (ref 3.81–5.12)
SODIUM SERPL-SCNC: 147 MMOL/L (ref 135–147)
WBC # BLD AUTO: 4.2 THOUSAND/UL (ref 4.31–10.16)
WBC # BLD AUTO: 4.54 THOUSAND/UL (ref 4.31–10.16)

## 2024-01-31 PROCEDURE — 82948 REAGENT STRIP/BLOOD GLUCOSE: CPT

## 2024-01-31 PROCEDURE — 80053 COMPREHEN METABOLIC PANEL: CPT | Performed by: INTERNAL MEDICINE

## 2024-01-31 PROCEDURE — 82272 OCCULT BLD FECES 1-3 TESTS: CPT | Performed by: INTERNAL MEDICINE

## 2024-01-31 PROCEDURE — 85025 COMPLETE CBC W/AUTO DIFF WBC: CPT | Performed by: INTERNAL MEDICINE

## 2024-01-31 PROCEDURE — 99233 SBSQ HOSP IP/OBS HIGH 50: CPT | Performed by: INTERNAL MEDICINE

## 2024-01-31 PROCEDURE — 85652 RBC SED RATE AUTOMATED: CPT | Performed by: INTERNAL MEDICINE

## 2024-01-31 PROCEDURE — 85025 COMPLETE CBC W/AUTO DIFF WBC: CPT | Performed by: STUDENT IN AN ORGANIZED HEALTH CARE EDUCATION/TRAINING PROGRAM

## 2024-01-31 PROCEDURE — 84145 PROCALCITONIN (PCT): CPT | Performed by: INTERNAL MEDICINE

## 2024-01-31 PROCEDURE — 86140 C-REACTIVE PROTEIN: CPT | Performed by: INTERNAL MEDICINE

## 2024-01-31 PROCEDURE — 99232 SBSQ HOSP IP/OBS MODERATE 35: CPT | Performed by: INTERNAL MEDICINE

## 2024-01-31 RX ADMIN — Medication: at 21:29

## 2024-01-31 RX ADMIN — CHLORHEXIDINE GLUCONATE 15 ML: 1.2 SOLUTION ORAL at 08:40

## 2024-01-31 RX ADMIN — HYDROCORTISONE SODIUM SUCCINATE 25 MG: 100 INJECTION, POWDER, FOR SOLUTION INTRAMUSCULAR; INTRAVENOUS at 08:40

## 2024-01-31 RX ADMIN — CEFAZOLIN SODIUM 2000 MG: 2 SOLUTION INTRAVENOUS at 06:37

## 2024-01-31 RX ADMIN — CHLORHEXIDINE GLUCONATE 15 ML: 1.2 SOLUTION ORAL at 21:35

## 2024-01-31 RX ADMIN — Medication 12.5 MG: at 08:40

## 2024-01-31 RX ADMIN — Medication: at 17:33

## 2024-01-31 RX ADMIN — Medication 12.5 MG: at 21:33

## 2024-01-31 RX ADMIN — DEXTROSE AND SODIUM CHLORIDE 100 ML/HR: 5; .45 INJECTION, SOLUTION INTRAVENOUS at 12:13

## 2024-01-31 RX ADMIN — HYDROCORTISONE SODIUM SUCCINATE 25 MG: 100 INJECTION, POWDER, FOR SOLUTION INTRAMUSCULAR; INTRAVENOUS at 21:22

## 2024-01-31 RX ADMIN — CEFAZOLIN SODIUM 2000 MG: 2 SOLUTION INTRAVENOUS at 14:16

## 2024-01-31 RX ADMIN — HYDROXYCHLOROQUINE SULFATE 200 MG: 200 TABLET, FILM COATED ORAL at 08:41

## 2024-01-31 RX ADMIN — Medication: at 10:43

## 2024-01-31 RX ADMIN — POLYETHYLENE GLYCOL 3350 17 G: 17 POWDER, FOR SOLUTION ORAL at 08:40

## 2024-01-31 RX ADMIN — CEFAZOLIN SODIUM 2000 MG: 2 SOLUTION INTRAVENOUS at 21:30

## 2024-01-31 RX ADMIN — HYDROXYCHLOROQUINE SULFATE 200 MG: 200 TABLET, FILM COATED ORAL at 17:33

## 2024-01-31 RX ADMIN — Medication 1000 UNITS: at 08:40

## 2024-01-31 RX ADMIN — DEXTROSE AND SODIUM CHLORIDE 100 ML/HR: 5; .45 INJECTION, SOLUTION INTRAVENOUS at 21:22

## 2024-01-31 NOTE — PLAN OF CARE
Problem: Prexisting or High Potential for Compromised Skin Integrity  Goal: Skin integrity is maintained or improved  Description: INTERVENTIONS:  - Identify patients at risk for skin breakdown  - Assess and monitor skin integrity  - Assess and monitor nutrition and hydration status  - Monitor labs   - Assess for incontinence   - Turn and reposition patient  - Assist with mobility/ambulation  - Relieve pressure over bony prominences  - Avoid friction and shearing  - Provide appropriate hygiene as needed including keeping skin clean and dry  - Evaluate need for skin moisturizer/barrier cream  - Collaborate with interdisciplinary team   - Patient/family teaching  - Consider wound care consult   Outcome: Progressing     Problem: PAIN - ADULT  Goal: Verbalizes/displays adequate comfort level or baseline comfort level  Description: Interventions:  - Encourage patient to monitor pain and request assistance  - Assess pain using appropriate pain scale  - Administer analgesics based on type and severity of pain and evaluate response  - Implement non-pharmacological measures as appropriate and evaluate response  - Consider cultural and social influences on pain and pain management  - Notify physician/advanced practitioner if interventions unsuccessful or patient reports new pain  Outcome: Progressing     Problem: INFECTION - ADULT  Goal: Absence or prevention of progression during hospitalization  Description: INTERVENTIONS:  - Assess and monitor for signs and symptoms of infection  - Monitor lab/diagnostic results  - Monitor all insertion sites, i.e. indwelling lines, tubes, and drains  - Monitor endotracheal if appropriate and nasal secretions for changes in amount and color  - [place] appropriate cooling/warming therapies per order  - Administer medications as ordered  - Instruct and encourage patient and family to use good hand hygiene technique  - Identify and instruct in appropriate isolation precautions for  identified infection/condition  Outcome: Progressing     Problem: Nutrition/Hydration-ADULT  Goal: Nutrient/Hydration intake appropriate for improving, restoring or maintaining nutritional needs  Description: Monitor and assess patient's nutrition/hydration status for malnutrition. Collaborate with interdisciplinary team and initiate plan and interventions as ordered.  Monitor patient's weight and dietary intake as ordered or per policy. Utilize nutrition screening tool and intervene as necessary. Determine patient's food preferences and provide high-protein, high-caloric foods as appropriate.     INTERVENTIONS:  - Monitor oral intake, urinary output, labs, and treatment plans  - Assess nutrition and hydration status and recommend course of action  - Evaluate amount of meals eaten  - Assist patient with eating if necessary   - Allow adequate time for meals  - Recommend/ encourage appropriate diets, oral nutritional supplements, and vitamin/mineral supplements  - Order, calculate, and assess calorie counts as needed  - Recommend, monitor, and adjust tube feedings and TPN/PPN based on assessed needs  - Assess need for intravenous fluids  - Provide specific nutrition/hydration education as appropriate  - Include patient/family/caregiver in decisions related to nutrition  Outcome: Progressing

## 2024-01-31 NOTE — CASE MANAGEMENT
Case Management Discharge Planning Note    Patient name Bhavna Al  Location [facility] 918/[facility] 918-01 MRN [id number]  : 1950 Date 2024       Current Admission Date: 2024  Current Admission Diagnosis:Septic shock (HCC)   Patient Active Problem List    Diagnosis Date Noted    Localized swelling on right hand 2024    Hematuria 2024    Encephalopathy 2024    Hypernatremia 2024    Localized swelling on left hand 2023    Chronic kidney disease 2023    Febrile illness 2023    Dermatitis associated with incontinence 2023    Right shoulder pain 12/15/2022    Abnormal urinalysis 2022    Ambulatory dysfunction 2022    Hyperuricemia 2022    Chronic diastolic heart failure (HCC) 2022    Acute bronchitis 2022    Assistance needed with transportation 09/15/2022    Abnormal CT of the chest 2022    Gram-positive cocci bacteremia 2022    Pustular psoriasis 2022    Elevated troponin 2022    COVID-19 2022    Acute renal failure superimposed on stage 3 chronic kidney disease (HCC) 01/10/2022    Paroxysmal atrial fibrillation ([facility]) 01/10/2022    Septic shock ([facility]) 01/10/2022    Hyperparathyroidism ([facility]) 2021    Murmur, cardiac 2021    BPPV (benign paroxysmal positional vertigo) 2018    Seasonal allergic rhinitis due to pollen 2018    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    Osteoporosis 2016    SIRS (systemic inflammatory response syndrome) (HCC) 2016    Rheumatoid arthritis ([facility]) 2016    GERD (gastroesophageal reflux disease) 2016    Sarcoid 2016    Morbid obesity ([facility]) 2016    Vitamin D deficiency 2015    Benign essential hypertension 2014    Lumbar radiculopathy 2014      LOS (days): 8  Geometric Mean LOS (GMLOS) (days): 5.1  Days to GMLOS:-3.3     OBJECTIVE:  Risk of Unplanned Readmission Score: 21.42         Current  admission status: Inpatient   Preferred Pharmacy:   [id number] - [place], [place] - [address]  [address]  [place] [address]  Phone: [phone] Fax: [phone]    [facility] - [place], [place] - [address]  [address] [address]  Phone: [phone] Fax: [phone]    Primary Care Provider: Nya Taveras DO    Primary Insurance: [facility] RE[facility]  Secondary Insurance: [facility]    DISCHARGE DETAILS:          Additional Comments: Per provider, pt to remain inpatient at this time, continues to receive IV abx. Once medically clear, pt will return to [facility] as pt is a long-term care patient there. [facility] requesting auth. SHARRI to follow.

## 2024-01-31 NOTE — QUICK NOTE
Patient took 25 minutes to take tylenol crushed in vanilla pudding. Patient kept holding medication in mouth and not swallowing. Took patient multiple attempts at redirection to swallow. Patient answered orientation questions correctly. Patient declines pain while swallowing.

## 2024-01-31 NOTE — PROGRESS NOTES
Progress Note - Infectious Disease   Bhavna JANUSZ Al 73 y.o. female MRN: [id number]  Unit/Bed#: [facility] 918-01 Encounter: [id number]      Impression/Plan:  1.  Sepsis.  Appears to be secondary to Staphylococcus aureus and Staphylococcus lugdunensis bacteremia.  The patient does have significant rash with areas of potential cutaneous breakdown that could lead to a transient bacteremia.  Consideration for the possibility of staph scalded skin syndrome.  The patient remains hemodynamically stable despite her systemic illness.  Procalcitonin level has been quite high.  Still with intermittent low-grade fever which may not be infection related but rather inflammatory related to the rheumatoid arthritis.  Repeat procalcitonin level has continued to decrease.  The CRP has also decreased since starting the steroids  -Antibiotics as below  -Follow-up blood cultures  -Recheck CBC with differential and BMP to make sure no developing antibiotic associated toxicities or worsening infection  -Additional workup as below  -Recheck procalcitonin level  -Recheck sedimentation rate and CRP     2.  MSSA and Staphylococcus lugdunensis bacteremia.  Suspected cutaneous translocation in the setting of advanced psoriasis.  Consideration for the possibility of endovascular infection.  Consideration for the possibility of staph scalded skin syndrome.  Poor windows on transthoracic echocardiogram but no valvular vegetation reported.  Repeat blood cultures are negative  -Continue cefazolin through 2/22/2024 to complete 4 weeks from the first negative blood culture  -Follow-up repeat blood cultures  -Remove PICC line after last dose of IV antibiotics  -Check CBC with differential and creatinine weekly while on the cefazolin  -The ID office has been messaged about laboratory and office follow-up     3.  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  The patient has desquamating but the lesions appear to be drying and crusting.  As patient is improved  and no longer requiring pressor support, will not add agents such as linezolid to suppress toxin production.  -Antibiotics as above  -Dermatology follow-up  -Topical treatment as needed  -Serial exams     4.  Acute encephalopathy.  In the setting of sepsis from bacteremia.  No headache or stiff neck to suggest a primary CNS infection.  Suspect multifactorial in this patient with baseline cognitive issues.  Clinically improved.  -Monitor cognition  -Antibiotics as above for now  -Additional workup as needed     5.  Psoriasis.  Not as active as in the past.  Has been evaluated by dermatology  -Outpatient dermatology follow-up     6.  Atrial fibrillation.  With a rapid ventricular response.  On rate control and anticoagulation     7.  Acute kidney injury.  Complicating chronic kidney disease.  Stage III.  Suspect prerenal issues playing a significant role.  Consideration for the possibility of sepsis playing a role.  Renal function is improving.  -Recheck BMP  -Dose adjusted antibiotics  -Volume management     8.  Rheumatoid arthritis.  On low-dose prednisone and hydroxychloroquine which had been held.  Seems to be flaring as far as joint disease.  The right hand however is more prominent than the left hand as far as swelling and involvement.  Regardless the swelling and tenderness seems to have significantly improved since restarting the steroids and Plaquenil.  -Serial exams  -Continue restarted steroids and Plaquenil    Discussed with the primary service the plan to continue the intravenous cefazolin through 2/22/2024.  They agree with the plan    Antibiotics:  Cefazolin 8  Antibiotics 9  Negative blood culture 6    Subjective:  Patient has no fever, chills, sweats; no nausea, vomiting, diarrhea; no cough, shortness of breath; no increased pain. No new symptoms.  She is much more alert and interactive.    Objective:  Vitals:  Temp:  [98.2 °F (36.8 °C)-98.9 °F (37.2 °C)] 98.2 °F (36.8 °C)  HR:  [80-91] 91  Resp:   [16-20] 20  BP: (134-168)/(65-83) 135/77  SpO2:  [97 %-99 %] 97 %  Temp (24hrs), Av.6 °F (37 °C), Min:98.2 °F (36.8 °C), Max:98.9 °F (37.2 °C)  Current: Temperature: 98.2 °F (36.8 °C)    Physical Exam:   General Appearance:  Alert, interactive, nontoxic, no acute distress.   Throat: Oropharynx moist without lesions.    Lungs:   Clear to auscultation bilaterally; no wheezes, rhonchi or rales; respirations unlabored   Heart:  RRR; no murmur, rub or gallop   Abdomen:   Soft, non-tender, non-distended, positive bowel sounds.     Extremities: No clubbing, cyanosis.  Decreased swelling involve the right and left hand.  Decreased tenderness.   Skin: No new rashes or lesions. No draining wounds noted.       Labs, Imaging, & Other studies:   All pertinent labs and imaging studies were personally reviewed  Results from last 7 days   Lab Units 24  0643 24  0542 24  0617   WBC Thousand/uL 4.20* 6.10 6.40   HEMOGLOBIN g/dL 9.8* 10.2* 10.8*   PLATELETS Thousands/uL 188 203 124*     Results from last 7 days   Lab Units 24  0643 24  0542 24  0617 24  0644 24  0517   SODIUM mmol/L 147 150*  149* 146   < > 148*  146   POTASSIUM mmol/L 3.8 4.2  4.2 3.4*   < > 3.3*  3.5   CHLORIDE mmol/L 110* 111*  111* 110*   < > 110*  109*   CO2 mmol/L 30 30  28 29   < > 25  23   BUN mg/dL 19 19  22 27*   < > 30*  30*   CREATININE mg/dL 1.03 1.08  1.08 1.36*   < > 2.02*  2.02*   EGFR ml/min/1.73sq m 53 50  50 38   < > 23  23   CALCIUM mg/dL 8.3* 8.7  8.9 9.2   < > 9.1  8.9   AST U/L 38 50*  --   --  32   ALT U/L <3* <3*  --   --  10   ALK PHOS U/L 59 67  --   --  37    < > = values in this interval not displayed.     Results from last 7 days   Lab Units 24  0535   BLOOD CULTURE  No Growth After 5 Days.  No Growth After 5 Days.     Results from last 7 days   Lab Units 24  0643 24  0542 24  0815   PROCALCITONIN ng/ml 0.61* 1.03* 10.15*     Results from last 7  days   Lab Units 01/31/24  0643 01/30/24  0542   CRP mg/L 119.7* 192.7*

## 2024-02-01 LAB
ALBUMIN SERPL BCP-MCNC: 2.6 G/DL (ref 3.5–5)
ALP SERPL-CCNC: 63 U/L (ref 34–104)
ALT SERPL W P-5'-P-CCNC: 3 U/L (ref 7–52)
ANION GAP SERPL CALCULATED.3IONS-SCNC: 4 MMOL/L
AST SERPL W P-5'-P-CCNC: 43 U/L (ref 13–39)
BASOPHILS # BLD AUTO: 0.01 THOUSANDS/ÂΜL (ref 0–0.1)
BASOPHILS NFR BLD AUTO: 0 % (ref 0–1)
BILIRUB SERPL-MCNC: 0.29 MG/DL (ref 0.2–1)
BUN SERPL-MCNC: 20 MG/DL (ref 5–25)
CALCIUM ALBUM COR SERPL-MCNC: 9.2 MG/DL (ref 8.3–10.1)
CALCIUM SERPL-MCNC: 8.1 MG/DL (ref 8.4–10.2)
CHLORIDE SERPL-SCNC: 110 MMOL/L (ref 96–108)
CO2 SERPL-SCNC: 30 MMOL/L (ref 21–32)
CREAT SERPL-MCNC: 1 MG/DL (ref 0.6–1.3)
CRP SERPL QL: 58.2 MG/L
EOSINOPHIL # BLD AUTO: 0.1 THOUSAND/ÂΜL (ref 0–0.61)
EOSINOPHIL NFR BLD AUTO: 2 % (ref 0–6)
ERYTHROCYTE [DISTWIDTH] IN BLOOD BY AUTOMATED COUNT: 15 % (ref 11.6–15.1)
GFR SERPL CREATININE-BSD FRML MDRD: 55 ML/MIN/1.73SQ M
GLUCOSE SERPL-MCNC: 102 MG/DL (ref 65–140)
GLUCOSE SERPL-MCNC: 119 MG/DL (ref 65–140)
GLUCOSE SERPL-MCNC: 86 MG/DL (ref 65–140)
GLUCOSE SERPL-MCNC: 98 MG/DL (ref 65–140)
HCT VFR BLD AUTO: 31.8 % (ref 34.8–46.1)
HGB BLD-MCNC: 9.5 G/DL (ref 11.5–15.4)
IMM GRANULOCYTES # BLD AUTO: 0.03 THOUSAND/UL (ref 0–0.2)
IMM GRANULOCYTES NFR BLD AUTO: 1 % (ref 0–2)
LYMPHOCYTES # BLD AUTO: 0.7 THOUSANDS/ÂΜL (ref 0.6–4.47)
LYMPHOCYTES NFR BLD AUTO: 16 % (ref 14–44)
MCH RBC QN AUTO: 26.8 PG (ref 26.8–34.3)
MCHC RBC AUTO-ENTMCNC: 29.9 G/DL (ref 31.4–37.4)
MCV RBC AUTO: 90 FL (ref 82–98)
MONOCYTES # BLD AUTO: 0.44 THOUSAND/ÂΜL (ref 0.17–1.22)
MONOCYTES NFR BLD AUTO: 10 % (ref 4–12)
NEUTROPHILS # BLD AUTO: 3.18 THOUSANDS/ÂΜL (ref 1.85–7.62)
NEUTS SEG NFR BLD AUTO: 71 % (ref 43–75)
NRBC BLD AUTO-RTO: 0 /100 WBCS
PLATELET # BLD AUTO: 184 THOUSANDS/UL (ref 149–390)
PMV BLD AUTO: 11.9 FL (ref 8.9–12.7)
POTASSIUM SERPL-SCNC: 3.4 MMOL/L (ref 3.5–5.3)
PROCALCITONIN SERPL-MCNC: 0.39 NG/ML
PROT SERPL-MCNC: 5.8 G/DL (ref 6.4–8.4)
RBC # BLD AUTO: 3.54 MILLION/UL (ref 3.81–5.12)
SODIUM SERPL-SCNC: 144 MMOL/L (ref 135–147)
WBC # BLD AUTO: 4.46 THOUSAND/UL (ref 4.31–10.16)

## 2024-02-01 PROCEDURE — 99233 SBSQ HOSP IP/OBS HIGH 50: CPT | Performed by: INTERNAL MEDICINE

## 2024-02-01 PROCEDURE — 92526 ORAL FUNCTION THERAPY: CPT

## 2024-02-01 PROCEDURE — 82948 REAGENT STRIP/BLOOD GLUCOSE: CPT

## 2024-02-01 PROCEDURE — 86140 C-REACTIVE PROTEIN: CPT | Performed by: INTERNAL MEDICINE

## 2024-02-01 PROCEDURE — 80053 COMPREHEN METABOLIC PANEL: CPT | Performed by: INTERNAL MEDICINE

## 2024-02-01 PROCEDURE — 99232 SBSQ HOSP IP/OBS MODERATE 35: CPT | Performed by: INTERNAL MEDICINE

## 2024-02-01 PROCEDURE — 85025 COMPLETE CBC W/AUTO DIFF WBC: CPT | Performed by: STUDENT IN AN ORGANIZED HEALTH CARE EDUCATION/TRAINING PROGRAM

## 2024-02-01 PROCEDURE — 84145 PROCALCITONIN (PCT): CPT | Performed by: INTERNAL MEDICINE

## 2024-02-01 RX ORDER — POTASSIUM CHLORIDE 20MEQ/15ML
40 LIQUID (ML) ORAL ONCE
Status: COMPLETED | OUTPATIENT
Start: 2024-02-01 | End: 2024-02-01

## 2024-02-01 RX ADMIN — CEFAZOLIN SODIUM 2000 MG: 2 SOLUTION INTRAVENOUS at 05:59

## 2024-02-01 RX ADMIN — CHLORHEXIDINE GLUCONATE 15 ML: 1.2 SOLUTION ORAL at 08:18

## 2024-02-01 RX ADMIN — HYDROXYCHLOROQUINE SULFATE 200 MG: 200 TABLET, FILM COATED ORAL at 18:06

## 2024-02-01 RX ADMIN — HYDROCORTISONE SODIUM SUCCINATE 25 MG: 100 INJECTION, POWDER, FOR SOLUTION INTRAMUSCULAR; INTRAVENOUS at 22:36

## 2024-02-01 RX ADMIN — DEXTROSE AND SODIUM CHLORIDE 100 ML/HR: 5; .45 INJECTION, SOLUTION INTRAVENOUS at 18:37

## 2024-02-01 RX ADMIN — Medication 12.5 MG: at 22:33

## 2024-02-01 RX ADMIN — Medication 1000 UNITS: at 08:17

## 2024-02-01 RX ADMIN — HYDROXYCHLOROQUINE SULFATE 200 MG: 200 TABLET, FILM COATED ORAL at 08:17

## 2024-02-01 RX ADMIN — Medication 1 APPLICATION: at 08:20

## 2024-02-01 RX ADMIN — POTASSIUM CHLORIDE 40 MEQ: 1.5 SOLUTION ORAL at 10:32

## 2024-02-01 RX ADMIN — PANTOPRAZOLE SODIUM 40 MG: 40 TABLET, DELAYED RELEASE ORAL at 05:59

## 2024-02-01 RX ADMIN — Medication: at 15:47

## 2024-02-01 RX ADMIN — Medication 1 APPLICATION: at 22:33

## 2024-02-01 RX ADMIN — HYDROCORTISONE SODIUM SUCCINATE 25 MG: 100 INJECTION, POWDER, FOR SOLUTION INTRAMUSCULAR; INTRAVENOUS at 08:17

## 2024-02-01 RX ADMIN — CHLORHEXIDINE GLUCONATE 15 ML: 1.2 SOLUTION ORAL at 22:37

## 2024-02-01 RX ADMIN — CEFAZOLIN SODIUM 2000 MG: 2 SOLUTION INTRAVENOUS at 13:49

## 2024-02-01 RX ADMIN — DEXTROSE AND SODIUM CHLORIDE 100 ML/HR: 5; .45 INJECTION, SOLUTION INTRAVENOUS at 08:18

## 2024-02-01 RX ADMIN — Medication 12.5 MG: at 08:17

## 2024-02-01 RX ADMIN — CEFAZOLIN SODIUM 2000 MG: 2 SOLUTION INTRAVENOUS at 22:34

## 2024-02-01 NOTE — PROGRESS NOTES
"[facility]  Progress Note  Name: Bhavna Al I  MRN: [id number]  Unit/Bed#: [facility]PHP 918-01 I Date of Admission: 1/22/2024   Date of Service: 1/31/2024 I Hospital Day: 8      Assessment & Plan:    * Septic shock (HCC)  Assessment & Plan  Septic shock now resolved - due to staphylococcal bacteremia and subsequent cellulitis   Transiently required vasopressor support in the ICU and stress dose steroids, now stabilized and transferred to the medical floor  Continue on IV Ancef per ID recommendations  Monitor vitals and maintain hemodynamics  Repeat blood cultures from 1/25 are negative - initial cultures grew multi-strain staphylococcal bacteremia - echocardiogram negative for evidence of endocarditis - see plan for staphylococcal infection below  Procalcitonin trend: 10.15 peak -> 1.03 -> 0.61 today     Localized swelling on right hand  Assessment & Plan  By reviewing the records previously, patient had a localized swelling of the left upper extremities.  Now with localized swelling of the right hand.  Doppler negative.   XR of hand on 1/28 noting: \"joint space widening and possible erosions involving the first interphalangeal joint. There is also suggestion of soft tissue swelling at the first phalanx. Cannot exclude septic arthropathy in the appropriate clinical setting \"  Previous provider discussed with rheumatology -> recommended to continue with outpatient regimen and monitor  Personally discussed with ID -> if clinically worsens, should pursue and surgery consultation, especially if concern/suspicion increases regarding septic arthropathy  Swelling/tenderness improved today     Hematuria  Assessment & Plan  Hematuria currently resolved s/p irrigation  Appreciate urology input  Will resume anticoagulation (Eliquis) in the upcoming days with consistent evidence of resolved hematuria     Encephalopathy  Assessment & Plan  Likely toxic metabolic encephalopathy due to septic shock " and decreased oral intake  Mentation improved today with IV fluid hydration  Limit/avoid sedating agents as possible  Continue neurochecks     Hypernatremia  Assessment & Plan  Likely secondary to decreased oral intake secondary to altered mentation and dysphagia  Oral diet modified to a puréed consistency with thin liquids -> encourage increased oral intake as recently possible in the setting of encephalopathy  Serum sodium of 150 today -> will initiate D5-1/2 NS infusion and continue to monitor -> hold diuretics while on IV fluids and monitor for iatrogenic any fluid overload     Acute kidney injury superimposed on chronic kidney disease stage 3 (HCC)  Assessment & Plan  Baseline creatinine of approximately 1.5-1.6 prior peak of 4.0 -> progressively improved currently at 1.03  Monitor renal function and urine output - limit/avoid nephrotoxins and hypotension as possible  Restarted on IV fluids, hence, diuretics held     Dermatitis associated with incontinence  Assessment & Plan  Skin moisturization with Eucerin  In order to avoid further skin infection possibly portal of entry being sites of skin breakdown, Corey catheter in place  Voiding trial once the skin lesions have eventually desquamated     Ambulatory dysfunction  Assessment & Plan  Resides in SNF for rehabilitation therapy  Appreciate case management consult     Chronic diastolic heart failure (HCC)  Assessment & Plan  Diuretics currently held in the setting of hyponatremia requiring IV fluid hydration for intravascular volume depletion - monitor for fluid overload  On beta-blockade with Lopressor     Pustular psoriasis  Assessment & Plan  Known history of biopsy-proven pustular psoriasis  Appreciate dermatology input -> continue current Solu-Cortef regimen  Patient planning by dermatology for biologic treatment     Paroxysmal atrial fibrillation (HCC)  Assessment & Plan  Rate controlled on Lopressor  Anticipate resuming anticoagulation (Eliquis) with  consistency of resolved hematuria     Staphylococcal scalded skin syndrome  Assessment & Plan  Superimposed on possible psoriasis  Continue IV Ancef per infectious disease  Initial blood cultures from 1/23 grew both MSSA, Staphylococcus lugdunensis, and Staphylococcus capitis - repeat blood cultures negative  Monitor skin desquamation - appreciate prior dermatology input     Essential hypertension  Assessment & Plan  Continue Lopressor     Rheumatoid arthritis (HCC)  Assessment & Plan  Continue Prednisone/Plaquenil  Outpatient rheumatology follow-up  Condition likely contributory to hand swelling    Morbid obesity (HCC)  Assessment & Plan  BMI of 44.29 currently  Lifestyle/diet modifications      DVT Prophylaxis:  SCDs - holding Eliquis    AM-PAC Basic Mobility:  Basic Mobility Inpatient Raw Score: 6  -HL Achieved: 2: Bed activities/Dependent transfer  -HL Goal: 2: Bed activities/Dependent transfer    Patient Centered Rounds:  I have performed bedside rounds and discussed plan of care with nursing today.  Discussions with Specialists or Other Care Team Provider:  see above assessments if applicable    Education and Discussions with Family / Patient:  Discussed with daughter, over the phone today    Time Spent for Care:  35 minutes. More than 50% of total time spent on counseling and coordination of care, on one or more of the following: performing physical exam; counseling and coordination of care, obtaining or reviewing history, documenting in the medical record, reviewing/ordering tests/medications/procedures, and communicating with other healthcare professionals.    Current Length of Stay: 8 day(s)  Current Patient Status: Inpatient   Certification Statement:  Patient will continue to require additional hospital stay due to assessments as noted above.    Code Status: Level 1 - Full Code        Subjective:     Seen and examined earlier in the day.  At the time my encounter, her mentation has significantly  improved after being initiated on IV fluids yesterday.  She does respond appropriately        Objective:     Vitals:   Temp (24hrs), Av.1 °F (36.7 °C), Min:97.5 °F (36.4 °C), Max:98.6 °F (37 °C)    Temp:  [97.5 °F (36.4 °C)-98.6 °F (37 °C)] 97.5 °F (36.4 °C)  HR:  [84-91] 84  Resp:  [18-20] 18  BP: (133-135)/(73-78) 133/78  SpO2:  [97 %-98 %] 98 %  Body mass index is 44.29 kg/m².     Input and Output Summary (last 24 hours):       Intake/Output Summary (Last 24 hours) at 2024  Last data filed at 2024 1259  Gross per 24 hour   Intake 1220 ml   Output 250 ml   Net 970 ml       Physical Exam:     GENERAL Obese - waxing/waning weakness/fatigue   HEAD   Normocephalic - atraumatic   EYES Nonicteric   MOUTH   Mucosa moist   NECK   Supple - full range of motion   CARDIAC Rate controlled - S1/S2 positive   PULMONARY    Diminished breath sounds with decreased history offered from body habitus - nonlabored respirations at rest   ABDOMEN   Soft - nontender/nondistended - active bowel sounds   MUSCULOSKELETAL   Motor strength/range of motion markedly deconditioned - bilateral hand swelling improving (R>L)   NEUROLOGIC Alert/oriented today with improved sluggishness   SKIN   Chronic wrinkles/blemishes - diffuse areas of pustular psoriasis and superimposed patches of the desquamation   PSYCHIATRIC   Mood/affect stable currently         Additional Data:     Labs & Recent Cultures:    Results from last 7 days   Lab Units 24  1833   WBC Thousand/uL 4.54   HEMOGLOBIN g/dL 10.1*   HEMATOCRIT % 32.7*   PLATELETS Thousands/uL 196   NEUTROS PCT % 74   LYMPHS PCT % 14   MONOS PCT % 9   EOS PCT % 2     Results from last 7 days   Lab Units 24  0643   POTASSIUM mmol/L 3.8   CHLORIDE mmol/L 110*   CO2 mmol/L 30   BUN mg/dL 19   CREATININE mg/dL 1.03   CALCIUM mg/dL 8.3*   ALK PHOS U/L 59   ALT U/L <3*   AST U/L 38     Results from last 7 days   Lab Units 24   INR  2.02*     Results from last 7 days    Lab Units 01/31/24  1619 01/31/24  1225 01/31/24  0614 01/31/24  0018 01/30/24  1738 01/30/24  1531 01/30/24  1206 01/30/24  0613 01/30/24  0123 01/30/24  0017 01/29/24  1808 01/29/24  1418   POC GLUCOSE mg/dl 120 113 122 113 110 129 82 85 93 73 86 100         Results from last 7 days   Lab Units 01/31/24  0643 01/30/24  0542 01/25/24 2002 01/25/24  0815   LACTIC ACID mmol/L  --   --  1.5  --    PROCALCITONIN ng/ml 0.61* 1.03*  --  10.15*         Results from last 7 days   Lab Units 01/25/24  0535   BLOOD CULTURE  No Growth After 5 Days.  No Growth After 5 Days.         Lines/Drains:  Invasive Devices       Peripherally Inserted Central Catheter Line  Duration             PICC Line 01/29/24 2 days              Drain  Duration             Urethral Catheter Straight-tip 18 Fr. 2 days                      Last 24 Hours Medication List:   Current Facility-Administered Medications   Medication Dose Route Frequency Provider Last Rate    acetaminophen  650 mg Oral Q4H PRN Valentine Brewer MD      albuterol  2 puff Inhalation Q6H PRN Pepe Do MD      cefazolin  2,000 mg Intravenous Q8H AZIZA Wolf 2,000 mg (01/31/24 1416)    chlorhexidine  15 mL Mouth/Throat Q12H [facility] AZIZA Wolf      cholecalciferol  1,000 Units Oral Daily Pepe Do MD      dextrose 5 % and sodium chloride 0.45 %  100 mL/hr Intravenous Continuous Ann Moe  mL/hr (01/31/24 1213)    docusate sodium  100 mg Oral BID PRN Pepe Do MD      hydrocortisone sodium succinate  25 mg Intravenous Q12H [facility] Umesh Kendrick, DO      hydroxychloroquine  200 mg Oral BID Valentine Brewer MD      metoprolol  5 mg Intravenous Q6H PRN AZIZA Wolf      metoprolol tartrate  12.5 mg Oral Q12H [facility] Issac Minor, DO      ondansetron  4 mg Intravenous Q6H PRN Pepe Do MD      pantoprazole  40 mg Oral Early Morning AZIZA Wolf      white petrolatum-mineral oil   Topical TID Pepe Camacho  MD RIMMA Do MD   Hospitalist - [facility] Internal Medicine        ** Please Note: This note is constructed using a voice recognition dictation system.  An occasional wrong word/phrase or “sound-a-like” substitution may have been picked up by dictation device due to the inherent limitations of voice recognition software.  Read the chart carefully and recognize, using reasonable context, where substitutions may have occurred.**

## 2024-02-01 NOTE — PROGRESS NOTES
"[facility]  Progress Note  Name: Bhavna Al I  MRN: [id number]  Unit/Bed#: PPHP 918-01 I Date of Admission: 1/22/2024   Date of Service: 2/1/2024 I Hospital Day: 9      Assessment & Plan:    * Septic shock (HCC)  Assessment & Plan  Septic shock now resolved - due to staphylococcal bacteremia and subsequent cellulitis   Transiently required vasopressor support in the ICU and stress dose steroids, now stabilized and transferred to the medical floor  Continue on IV Ancef per ID recommendations through 2/22  Monitor vitals and maintain hemodynamics  Repeat blood cultures from 1/25 are negative - initial cultures grew multi-strain staphylococcal bacteremia - echocardiogram negative for evidence of endocarditis - see plan for staphylococcal infection below  Procalcitonin trend: 10.15 peak -> 1.03 -> 0.61      Localized swelling on right hand  Assessment & Plan  By reviewing the records previously, patient had a localized swelling of the left upper extremities.  Now with localized swelling of the right hand.  Doppler negative.   XR of hand on 1/28 noting: \"joint space widening and possible erosions involving the first interphalangeal joint. There is also suggestion of soft tissue swelling at the first phalanx. Cannot exclude septic arthropathy in the appropriate clinical setting \"  Previous provider discussed with rheumatology -> recommended to continue with outpatient regimen and monitor  Personally discussed with ID -> if clinically worsens, should pursue and surgery consultation, especially if concern/suspicion increases regarding septic arthropathy  Swelling/tenderness improved over the last few days     Hematuria  Assessment & Plan  Hematuria currently resolved s/p irrigation  Appreciate urology input  Will resume anticoagulation (Eliquis) in the upcoming days with continued resolution of hematuria     Encephalopathy  Assessment & Plan  Likely toxic metabolic encephalopathy due " to septic shock and decreased oral intake  Mentation improved over the last few days with IV fluid hydration  Limit/avoid sedating agents as possible  Continue neurochecks     Hypernatremia  Assessment & Plan  Likely secondary to decreased oral intake secondary to altered mentation and dysphagia  Oral diet modified to a puréed consistency with thin liquids -> encourage increased oral intake as recently possible in the setting of encephalopathy  Serum sodium steadily improving after initiation of D5-1/2 NS infusion -> hold diuretics while on IV fluids and monitor for iatrogenic any fluid overload     Acute kidney injury superimposed on chronic kidney disease stage 3 (HCC)  Assessment & Plan  Baseline creatinine of approximately 1.5-1.6 prior peak of 4.0 -> progressively improved currently at 1.0  Monitor renal function and urine output - limit/avoid nephrotoxins and hypotension as possible  Restarted on IV fluids, hence, diuretics held     Dermatitis associated with incontinence  Assessment & Plan  Skin moisturization with Eucerin  In order to avoid further skin infection possibly portal of entry being sites of skin breakdown, Corey catheter in place  Voiding trial once the skin lesions have eventually desquamated     Ambulatory dysfunction  Assessment & Plan  Resides in SNF for rehabilitation therapy  Appreciate case management consult     Chronic diastolic heart failure (HCC)  Assessment & Plan  Diuretics currently held in the setting of hyponatremia requiring IV fluid hydration for intravascular volume depletion - monitor for fluid overload  On beta-blockade with Lopressor     Pustular psoriasis  Assessment & Plan  Known history of biopsy-proven pustular psoriasis  Appreciate dermatology input -> continue current Solu-Cortef regimen  Patient planning by dermatology for biologic treatment     Paroxysmal atrial fibrillation (HCC)  Assessment & Plan  Rate controlled on Lopressor  Anticipate resuming anticoagulation  (Eliquis) with consistency of resolved hematuria     Staphylococcal scalded skin syndrome  Assessment & Plan  Superimposed on possible psoriasis  Continue IV Ancef per infectious disease  Initial blood cultures from 1/23 grew both MSSA, Staphylococcus lugdunensis, and Staphylococcus capitis - repeat blood cultures negative  Monitor skin desquamation - appreciate prior dermatology input     Essential hypertension  Assessment & Plan  Continue Lopressor     Rheumatoid arthritis (HCC)  Assessment & Plan  Continue Prednisone/Plaquenil  Outpatient rheumatology follow-up  Condition likely contributory to hand swelling    Morbid obesity (HCC)  Assessment & Plan  BMI of 44.29 currently  Lifestyle/diet modifications      DVT Prophylaxis:  SCDs - holding Eliquis    AM-PAC Basic Mobility:  Basic Mobility Inpatient Raw Score: 6  JH-HLM Achieved: 2: Bed activities/Dependent transfer  -HLM Goal: 2: Bed activities/Dependent transfer    Patient Centered Rounds:  I have performed bedside rounds and discussed plan of care with nursing today.  Discussions with Specialists or Other Care Team Provider:  see above assessments if applicable    Education and Discussions with Family / Patient:  Patient at bedside - daughter aware of ongoing treatment plan    Time Spent for Care:  35 minutes. More than 50% of total time spent on counseling and coordination of care, on one or more of the following: performing physical exam; counseling and coordination of care, obtaining or reviewing history, documenting in the medical record, reviewing/ordering tests/medications/procedures, and communicating with other healthcare professionals.    Current Length of Stay: 9 day(s)  Current Patient Status: Inpatient   Certification Statement:  Patient will continue to require additional hospital stay due to assessments as noted above.    Code Status: Level 1 - Full Code        Subjective:     Seen and examined earlier in the day.  At the time my encounter, her  mentation has significantly improved after being initiated on IV fluids yesterday.  She does respond appropriately        Objective:     Vitals:   Temp (24hrs), Av.8 °F (37.1 °C), Min:98.5 °F (36.9 °C), Max:99.1 °F (37.3 °C)    Temp:  [98.5 °F (36.9 °C)-99.1 °F (37.3 °C)] 99.1 °F (37.3 °C)  HR:  [72-95] 72  Resp:  [16-22] 16  BP: (125-159)/(75-84) 159/82  SpO2:  [96 %-99 %] 98 %  Body mass index is 44.29 kg/m².     Input and Output Summary (last 24 hours):       Intake/Output Summary (Last 24 hours) at 2024 1722  Last data filed at 2024 1557  Gross per 24 hour   Intake 978.33 ml   Output 585 ml   Net 393.33 ml       Physical Exam:     GENERAL Slightly improved weakness/fatigue today - obese   HEAD   Normocephalic - atraumatic   EYES Nonicteric   MOUTH   Mucosa moist   NECK   Supple - full range of motion   CARDIAC Rate controlled   PULMONARY    Mildly diminished breath sounds due to decreased body habitus - nonlabored respirations at rest   ABDOMEN   Soft - nontender/nondistended - active bowel sounds   MUSCULOSKELETAL   Motor strength/range of motion markedly deconditioned - bilateral hand swelling improving (R>L)   NEUROLOGIC Alert/oriented with improving sluggishness   SKIN   Chronic wrinkles/blemishes - diffuse areas of pustular psoriasis and superimposed patches of the desquamation   PSYCHIATRIC   Mood/affect stable          Additional Data:     Labs & Recent Cultures:    Results from last 7 days   Lab Units 24  0814   WBC Thousand/uL 4.46   HEMOGLOBIN g/dL 9.5*   HEMATOCRIT % 31.8*   PLATELETS Thousands/uL 184   NEUTROS PCT % 71   LYMPHS PCT % 16   MONOS PCT % 10   EOS PCT % 2     Results from last 7 days   Lab Units 24  0814   POTASSIUM mmol/L 3.4*   CHLORIDE mmol/L 110*   CO2 mmol/L 30   BUN mg/dL 20   CREATININE mg/dL 1.00   CALCIUM mg/dL 8.1*   ALK PHOS U/L 63   ALT U/L 3*   AST U/L 43*     Results from last 7 days   Lab Units 24   INR  2.02*     Results from last 7 days    Lab Units 02/01/24  1139 02/01/24  0004 01/31/24  2048 01/31/24  1619 01/31/24  1225 01/31/24  0614 01/31/24  0018 01/30/24  1738 01/30/24  1531 01/30/24  1206 01/30/24  0613 01/30/24  0123   POC GLUCOSE mg/dl 119 102 101 120 113 122 113 110 129 82 85 93         Results from last 7 days   Lab Units 02/01/24  0814 01/31/24  0643 01/30/24  0542 01/25/24 2002   LACTIC ACID mmol/L  --   --   --  1.5   PROCALCITONIN ng/ml 0.39* 0.61* 1.03*  --                    Lines/Drains:  Invasive Devices       Peripherally Inserted Central Catheter Line  Duration             PICC Line 01/29/24 3 days              Drain  Duration             Urethral Catheter Straight-tip 18 Fr. 3 days                      Last 24 Hours Medication List:   Current Facility-Administered Medications   Medication Dose Route Frequency Provider Last Rate    acetaminophen  650 mg Oral Q4H PRN Valentine Brewer MD      albuterol  2 puff Inhalation Q6H PRN Pepe Do MD      alteplase  2 mg Intracatheter Once AZIZA Wolf      cefazolin  2,000 mg Intravenous Q8H AZIZA Wolf Stopped (02/01/24 1546)    chlorhexidine  15 mL Mouth/Throat Q12H Counts include 234 beds at the [facility] AZIZA Wolf      cholecalciferol  1,000 Units Oral Daily Pepe Do MD      dextrose 5 % and sodium chloride 0.45 %  100 mL/hr Intravenous Continuous Ann Moe  mL/hr (02/01/24 0818)    docusate sodium  100 mg Oral BID PRN Pepe Do MD      hydrocortisone sodium succinate  25 mg Intravenous Q12H Counts include 234 beds at the [facility] Umesh Kendrick, DO      hydroxychloroquine  200 mg Oral BID Valentine Brewer MD      metoprolol  5 mg Intravenous Q6H PRN AZIZA Wolf      metoprolol tartrate  12.5 mg Oral Q12H Counts include 234 beds at the [facility] Issac Minor DO      ondansetron  4 mg Intravenous Q6H PRN Pepe Do MD      pantoprazole  40 mg Oral Early Morning AZIZA Wolf      white petrolatum-mineral oil   Topical TID MD ANN Turner MD   Hospitalist  - [facility] Internal Medicine        ** Please Note: This note is constructed using a voice recognition dictation system.  An occasional wrong word/phrase or “sound-a-like” substitution may have been picked up by dictation device due to the inherent limitations of voice recognition software.  Read the chart carefully and recognize, using reasonable context, where substitutions may have occurred.**

## 2024-02-01 NOTE — SPEECH THERAPY NOTE
"Speech Language/Pathology    Speech/Language Pathology Progress Note    Patient Name: Bhavna Al  Today's Date: 2024     Problem List  Principal Problem:    Septic shock (HCC)  Active Problems:    Rheumatoid arthritis (HCC)    Benign essential hypertension    Morbid obesity (HCC)    Staphylococcal scalded skin syndrome    Acute renal failure superimposed on stage 3 chronic kidney disease (HCC)    Paroxysmal atrial fibrillation (HCC)    Pustular psoriasis    Chronic diastolic heart failure (HCC)    Ambulatory dysfunction    Dermatitis associated with incontinence    Chronic kidney disease    Hypernatremia    Encephalopathy    Hematuria    Localized swelling on right hand       Past Medical History  Past Medical History:   Diagnosis Date    Abnormal thyroid function test     last assessed: 2015     Arthritis     Caries     last assessed: 2016     Continuous opioid dependence (HCC) 2021    Edema of right lower extremity     last assessed: 2015     GERD (gastroesophageal reflux disease)     Hypertension     Medicare annual wellness visit, subsequent 2021    Positive blood culture 3/11/2022    Sarcoid         Past Surgical History  Past Surgical History:   Procedure Laterality Date     SECTION      IR PICC PLACEMENT SINGLE LUMEN  2024    MULTIPLE TOOTH EXTRACTIONS N/A 2016    Procedure: Surgical extraction of teeth 2, 18, 19, 30, 31; incision and drainage of left subperiosteal abscess ;  Surgeon: Clara Cevallos DMD;  Location: [facility];  Service:      Subjective:  \"I don't eat that soft food.\" Patient is awake and alert.     Objective:  Patient is seen for dysphagia therapy. She is laying upright in bed and participates in more conversation than previous session. Patient is assessed with regular and thin liquids. Materials included 2 bites of melissa dunes and 3 sips of thin liquids. Mastication and transfer are timely and efficient. Thin liquids are " tolerated well via straw. No overt s/s of aspiration observed.     Assessment:  Patient tolerates regular solids and thin liquids well.     Plan/Recommendations:  Upgrade diet to level 3 dental soft solids and thin liquids. Continue ST.

## 2024-02-01 NOTE — CASE MANAGEMENT
Case Management Discharge Planning Note    Patient name Bhavna Al  Location [facility] 918/[facility] 918-01 MRN [id number]  : 1950 Date 2024       Current Admission Date: 2024  Current Admission Diagnosis:Septic shock (HCC)   Patient Active Problem List    Diagnosis Date Noted    Localized swelling on right hand 2024    Hematuria 2024    Encephalopathy 2024    Hypernatremia 2024    Localized swelling on left hand 2023    Chronic kidney disease 2023    Febrile illness 2023    Dermatitis associated with incontinence 2023    Right shoulder pain 12/15/2022    Abnormal urinalysis 2022    Ambulatory dysfunction 2022    Hyperuricemia 2022    Chronic diastolic heart failure (HCC) 2022    Acute bronchitis 2022    Assistance needed with transportation 09/15/2022    Abnormal CT of the chest 2022    Gram-positive cocci bacteremia 2022    Pustular psoriasis 2022    Elevated troponin 2022    COVID-19 2022    Acute renal failure superimposed on stage 3 chronic kidney disease (HCC) 01/10/2022    Paroxysmal atrial fibrillation ([facility]) 01/10/2022    Septic shock ([facility]) 01/10/2022    Hyperparathyroidism ([facility]) 2021    Murmur, cardiac 2021    BPPV (benign paroxysmal positional vertigo) 2018    Seasonal allergic rhinitis due to pollen 2018    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    Osteoporosis 2016    SIRS (systemic inflammatory response syndrome) (HCC) 2016    Rheumatoid arthritis ([facility]) 2016    GERD (gastroesophageal reflux disease) 2016    Sarcoid 2016    Morbid obesity ([facility]) 2016    Vitamin D deficiency 2015    Benign essential hypertension 2014    Lumbar radiculopathy 2014      LOS (days): 9  Geometric Mean LOS (GMLOS) (days): 5.1  Days to GMLOS:-4.3     OBJECTIVE:  Risk of Unplanned Readmission Score: 21.72         Current  admission status: Inpatient   Preferred Pharmacy:   [id number] - [place], [place] - [address]  [address]  [place] [address]  Phone: [phone] Fax: [phone]    [facility] - [place], [place] - [address]  [address] [address]  Phone: [phone] Fax: [phone]    Primary Care Provider: Nya Taveras DO    Primary Insurance: [employer]  REP  Secondary Insurance: [facility]    DISCHARGE DETAILS:          Additional Comments: Via multidisciplinary round it was determined patient is not medically ready for DC.  Hospitalist Dr Moe informed CM patient will need IV ABX through mid February and needs a voiding trial.  Dc plan STR at [place] [place] [place].  Patient is a LTC resident at the same facility.  CM to be available

## 2024-02-01 NOTE — PROGRESS NOTES
Progress Note - Infectious Disease   Bhavna Al 73 y.o. female MRN: [id number]  Unit/Bed#: [facility] 918-01 Encounter: [id number]      Impression/Plan:    1.  Sepsis.  Appears to be secondary to MSSA and Staphylococcus lugdunensis bacteremia.  The patient does have significant rash with areas of potential cutaneous breakdown that could lead to a transient bacteremia.  Consideration for the possibility of staph scalded skin syndrome.  The patient remains hemodynamically stable despite her systemic illness.  Procalcitonin level has been quite high.  Still with intermittent low-grade fever which may not be infection related but rather inflammatory related to the rheumatoid arthritis.  Repeat procalcitonin level has continued to decrease, CRP has also decreased since starting the steroids. Repeat blood cultures negative on 1/25.  -Antibiotics as below  -Recheck CBC with differential and BMP to make sure no developing antibiotic associated toxicities or worsening infection  -Additional workup as below     2.  MSSA and Staphylococcus lugdunensis bacteremia.  Suspected cutaneous translocation in the setting of advanced psoriasis.  Consideration for the possibility of endovascular infection.  Consideration for the possibility of staph scalded skin syndrome.  Poor windows on transthoracic echocardiogram but no valvular vegetation reported.  Repeat blood cultures are negative on 1/25.  -Continue cefazolin through 2/22/2024 to complete 4 weeks from the first negative blood culture  -Remove PICC line after last dose of IV antibiotics  -Check CBC with differential and creatinine weekly while on the cefazolin  -The ID office has been messaged about laboratory and office follow-up     3.  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  The patient has desquamating but the lesions appear to be drying and crusting.  As patient is improved and no longer requiring pressor support, will not add agents such as linezolid to suppress toxin  production.  -Antibiotics as above  -Dermatology follow-up  -Topical treatment as needed  -Serial exams     4.  Acute encephalopathy.  In the setting of sepsis from bacteremia.  No headache or stiff neck to suggest a primary CNS infection.  Suspect multifactorial in this patient with baseline cognitive issues.  Clinically improved.  -Monitor cognition  -Antibiotics as above for now  -Additional workup as needed     5.  Psoriasis.  Not as active as in the past.  Has been evaluated by dermatology  -Outpatient dermatology follow-up     6.  Atrial fibrillation.  With a rapid ventricular response.  On rate control and anticoagulation     7.  Acute kidney injury.  Complicating chronic kidney disease.  Stage III.  Suspect prerenal issues playing a significant role.  Consideration for the possibility of sepsis playing a role.  Renal function is improving.  -Recheck BMP  -Dose adjusted antibiotics  -Volume management     8.  Rheumatoid arthritis.  On low-dose prednisone and hydroxychloroquine which had been held.  Seems to be flaring as far as joint disease.  The right hand however is more prominent than the left hand as far as swelling and involvement.  Regardless the swelling and tenderness seems to have significantly improved since restarting the steroids and Plaquenil.  -Serial exams  -Continue restarted steroids and Plaquenil     Discussed with the primary service the plan to continue the intravenous cefazolin through 2024.  They agree with the plan    Antibiotics:  Cefazolin: 9  Total abx days: 10    24 Hour Events:  Afebrile, WBC normal this AM.     Subjective:  Patient has no fever, chills, sweats; no nausea, vomiting.    Objective:  Vitals:  Temp:  [97.5 °F (36.4 °C)-98.7 °F (37.1 °C)] 98.7 °F (37.1 °C)  HR:  [84-95] 87  Resp:  [16-22] 16  BP: (125-133)/(75-84) 133/84  SpO2:  [96 %-99 %] 96 %  Temp (24hrs), Av.2 °F (36.8 °C), Min:97.5 °F (36.4 °C), Max:98.7 °F (37.1 °C)  Current: Temperature: 98.7 °F (37.1  °C)    Physical Exam:   General Appearance:  Alert, interactive, nontoxic, no acute distress.   Throat: Oropharynx moist without lesions.    Lungs:   Clear to auscultation bilaterally; no wheezes, rhonchi or rales; respirations unlabored   Heart:  RRR; no murmur, rub or gallop   Abdomen:   Soft, non-tender, non-distended, positive bowel sounds.     Extremities: No clubbing, cyanosis or edema   Skin: No new rashes or lesions.        Labs:   All pertinent labs and imaging studies were personally reviewed  Results from last 7 days   Lab Units 02/01/24  0814 01/31/24  1833 01/31/24  0643   WBC Thousand/uL 4.46 4.54 4.20*   HEMOGLOBIN g/dL 9.5* 10.1* 9.8*   PLATELETS Thousands/uL 184 196 188     Results from last 7 days   Lab Units 02/01/24  0814 01/31/24  0643 01/30/24  0542   SODIUM mmol/L 144 147 150*  149*   POTASSIUM mmol/L 3.4* 3.8 4.2  4.2   CHLORIDE mmol/L 110* 110* 111*  111*   CO2 mmol/L 30 30 30  28   BUN mg/dL 20 19 19  22   CREATININE mg/dL 1.00 1.03 1.08  1.08   EGFR ml/min/1.73sq m 55 53 50  50   CALCIUM mg/dL 8.1* 8.3* 8.7  8.9   AST U/L 43* 38 50*   ALT U/L 3* <3* <3*   ALK PHOS U/L 63 59 67     Results from last 7 days   Lab Units 02/01/24  0814 01/31/24  0643 01/30/24  0542   PROCALCITONIN ng/ml 0.39* 0.61* 1.03*     Results from last 7 days   Lab Units 02/01/24  0814 01/31/24  0643 01/30/24  0542   CRP mg/L 58.2* 119.7* 192.7*               Micro:        Imaging:          Rodri Hartman MD  [facility]

## 2024-02-02 LAB
GLUCOSE SERPL-MCNC: 105 MG/DL (ref 65–140)
GLUCOSE SERPL-MCNC: 106 MG/DL (ref 65–140)

## 2024-02-02 PROCEDURE — 92526 ORAL FUNCTION THERAPY: CPT

## 2024-02-02 PROCEDURE — 99232 SBSQ HOSP IP/OBS MODERATE 35: CPT | Performed by: INTERNAL MEDICINE

## 2024-02-02 PROCEDURE — 82948 REAGENT STRIP/BLOOD GLUCOSE: CPT

## 2024-02-02 RX ADMIN — DEXTROSE AND SODIUM CHLORIDE 100 ML/HR: 5; .45 INJECTION, SOLUTION INTRAVENOUS at 05:31

## 2024-02-02 RX ADMIN — CEFAZOLIN SODIUM 2000 MG: 2 SOLUTION INTRAVENOUS at 17:03

## 2024-02-02 RX ADMIN — Medication: at 09:37

## 2024-02-02 RX ADMIN — Medication 12.5 MG: at 09:35

## 2024-02-02 RX ADMIN — DEXTROSE AND SODIUM CHLORIDE 100 ML/HR: 5; .45 INJECTION, SOLUTION INTRAVENOUS at 22:12

## 2024-02-02 RX ADMIN — CEFAZOLIN SODIUM 2000 MG: 2 SOLUTION INTRAVENOUS at 09:36

## 2024-02-02 RX ADMIN — Medication 1 APPLICATION: at 21:41

## 2024-02-02 RX ADMIN — CHLORHEXIDINE GLUCONATE 15 ML: 1.2 SOLUTION ORAL at 09:36

## 2024-02-02 RX ADMIN — CEFAZOLIN SODIUM 2000 MG: 2 SOLUTION INTRAVENOUS at 22:16

## 2024-02-02 RX ADMIN — HYDROXYCHLOROQUINE SULFATE 200 MG: 200 TABLET, FILM COATED ORAL at 17:04

## 2024-02-02 RX ADMIN — HYDROCORTISONE SODIUM SUCCINATE 25 MG: 100 INJECTION, POWDER, FOR SOLUTION INTRAMUSCULAR; INTRAVENOUS at 09:35

## 2024-02-02 RX ADMIN — CHLORHEXIDINE GLUCONATE 15 ML: 1.2 SOLUTION ORAL at 21:42

## 2024-02-02 RX ADMIN — Medication 1000 UNITS: at 09:35

## 2024-02-02 RX ADMIN — HYDROCORTISONE SODIUM SUCCINATE 25 MG: 100 INJECTION, POWDER, FOR SOLUTION INTRAMUSCULAR; INTRAVENOUS at 21:42

## 2024-02-02 RX ADMIN — Medication 12.5 MG: at 21:42

## 2024-02-02 RX ADMIN — PANTOPRAZOLE SODIUM 40 MG: 40 TABLET, DELAYED RELEASE ORAL at 05:44

## 2024-02-02 RX ADMIN — HYDROXYCHLOROQUINE SULFATE 200 MG: 200 TABLET, FILM COATED ORAL at 09:37

## 2024-02-02 RX ADMIN — ALTEPLASE 2 MG: 2.2 INJECTION, POWDER, LYOPHILIZED, FOR SOLUTION INTRAVENOUS at 05:41

## 2024-02-02 NOTE — PROGRESS NOTES
"[facility]  Progress Note  Name: Bhavna Al I  MRN: [id number]  Unit/Bed#: [facility] 918-01 I Date of Admission: 1/22/2024   Date of Service: 2/2/2024 I Hospital Day: 10      Assessment & Plan:    * Septic shock (HCC)  Assessment & Plan  Septic shock now resolved - due to staphylococcal bacteremia and subsequent cellulitis   Transiently required vasopressor support in the ICU and stress dose steroids, now stabilized and transferred to the medical floor  Continue on IV Ancef per ID recommendations through 2/22  Monitor vitals and maintain hemodynamics  Repeat blood cultures from 1/25 are negative - initial cultures grew multi-strain staphylococcal bacteremia - echocardiogram negative for evidence of endocarditis - see plan for staphylococcal infection below  Procalcitonin trend: 10.15 peak -> 1.03 -> 0.61      Localized swelling on right hand  Assessment & Plan  By reviewing the records previously, patient had a localized swelling of the left upper extremities.  Now with localized swelling of the right hand.  Doppler negative.   XR of hand on 1/28 noting: \"joint space widening and possible erosions involving the first interphalangeal joint. There is also suggestion of soft tissue swelling at the first phalanx. Cannot exclude septic arthropathy in the appropriate clinical setting \"  Previous provider discussed with rheumatology -> recommended to continue with outpatient regimen and monitor  Personally discussed with ID -> if clinically worsens, should pursue and surgery consultation, especially if concern/suspicion increases regarding septic arthropathy  Swelling/tenderness continues to improve over the last several days    Staphylococcal scalded skin syndrome  Assessment & Plan  Superimposed on possible psoriasis  Continue IV Ancef per infectious disease through 2/22   Initial blood cultures from 1/23 grew both MSSA, Staphylococcus lugdunensis, and Staphylococcus capitis - repeat " blood cultures negative  Monitor skin desquamation - appreciate prior dermatology input     Hematuria  Assessment & Plan  Hematuria currently resolved s/p irrigation  Appreciate urology input  Plan to resume anticoagulation (Eliquis) tomorrow if patient remains free of hematuria     Encephalopathy  Assessment & Plan  Likely toxic metabolic encephalopathy due to septic shock and decreased oral intake  Mentation improved over the last few days with IV fluid hydration  Limit/avoid sedating agents as possible  Continue neurochecks     Hypernatremia  Assessment & Plan  Likely secondary to decreased oral intake secondary to altered mentation and dysphagia  Oral diet modified to a puréed consistency with thin liquids -> encourage increased oral intake as recently possible in the setting of encephalopathy  Serum sodium steadily improving after initiation of D5-1/2 NS infusion -> hold diuretics while on IV fluids and monitor for iatrogenic any fluid overload     Acute kidney injury superimposed on chronic kidney disease stage 3 (HCC)  Assessment & Plan  Baseline creatinine of approximately 1.5-1.6 prior peak of 4.0 -> progressively improved currently at 1.0 yesterday  Monitor renal function and urine output - limit/avoid nephrotoxins and hypotension as possible  Restarted on IV fluids, hence, diuretics held     Dermatitis associated with incontinence  Assessment & Plan  Skin moisturization with Eucerin  In order to avoid further skin infection possibly portal of entry being sites of skin breakdown, Corey catheter in place  Voiding trial once the skin lesions have eventually desquamated     Ambulatory dysfunction  Assessment & Plan  Resides in SNF for rehabilitation therapy  Appreciate case management consult     Chronic diastolic heart failure (HCC)  Assessment & Plan  Diuretics currently held in the setting of hyponatremia requiring IV fluid hydration for intravascular volume depletion - monitor for fluid overload  On  beta-blockade with Lopressor     Pustular psoriasis  Assessment & Plan  Known history of biopsy-proven pustular psoriasis  Appreciate dermatology input -> continue current Solu-Cortef regimen  Patient planning by dermatology for biologic treatment     Paroxysmal atrial fibrillation (HCC)  Assessment & Plan  Rate controlled on Lopressor  Anticipate resuming anticoagulation (Eliquis) tomorrow     Essential hypertension  Assessment & Plan  Continue Lopressor     Rheumatoid arthritis (HCC)  Assessment & Plan  Continue Prednisone/Plaquenil  Outpatient rheumatology follow-up  Condition likely contributory to hand swelling    Morbid obesity (HCC)  Assessment & Plan  BMI of 44.29 currently  Lifestyle/diet modifications      DVT Prophylaxis:  SCDs - holding Eliquis    AM-PAC Basic Mobility:  Basic Mobility Inpatient Raw Score: 6  -HL Achieved: 2: Bed activities/Dependent transfer  -HL Goal: 2: Bed activities/Dependent transfer    Patient Centered Rounds:  I have performed bedside rounds and discussed plan of care with nursing today.  Discussions with Specialists or Other Care Team Provider:  see above assessments if applicable    Education and Discussions with Family / Patient:  Patient at bedside - daughter aware of ongoing treatment plan    Time Spent for Care:  35 minutes. More than 50% of total time spent on counseling and coordination of care, on one or more of the following: performing physical exam; counseling and coordination of care, obtaining or reviewing history, documenting in the medical record, reviewing/ordering tests/medications/procedures, and communicating with other healthcare professionals.    Current Length of Stay: 10 day(s)  Current Patient Status: Inpatient   Certification Statement:  Patient will continue to require additional hospital stay due to assessments as noted above.    Code Status: Level 1 - Full Code        Subjective:     Encountered earlier today. Reports improvement in hand pain.  More alert and awake.         Objective:     Vitals:   Temp (24hrs), Av.3 °F (37.4 °C), Min:99.1 °F (37.3 °C), Max:99.4 °F (37.4 °C)    Temp:  [99.1 °F (37.3 °C)-99.4 °F (37.4 °C)] 99.1 °F (37.3 °C)  HR:  [68-94] 68  Resp:  [18-20] 18  BP: (125-136)/(72-80) 136/80  SpO2:  [97 %-98 %] 97 %  Body mass index is 44.29 kg/m².     Input and Output Summary (last 24 hours):       Intake/Output Summary (Last 24 hours) at 2024 1856  Last data filed at 2024 0531  Gross per 24 hour   Intake 1188.34 ml   Output 265 ml   Net 923.34 ml       Physical Exam:     GENERAL Weakness/fatigue improving - obese   HEAD   Normocephalic - atraumatic   EYES Nonicteric   MOUTH   Mucosa moist   NECK   Supple - full range of motion   CARDIAC Rate controlled - S1/S2 positive   PULMONARY    Somewhat diminished breath sounds due to decreased body habitus - nonlabored respirations at rest   ABDOMEN   Soft - nontender/nondistended - active bowel sounds   MUSCULOSKELETAL   Motor strength/range of motion markedly deconditioned - bilateral hand swelling improving (R>L)   NEUROLOGIC Alert/oriented with improving sluggishness   SKIN   Chronic wrinkles/blemishes - diffuse areas of pustular psoriasis and superimposed patches of the desquamation   PSYCHIATRIC   Mood/affect stable          Additional Data:     Labs & Recent Cultures:    Results from last 7 days   Lab Units 24  0814   WBC Thousand/uL 4.46   HEMOGLOBIN g/dL 9.5*   HEMATOCRIT % 31.8*   PLATELETS Thousands/uL 184   NEUTROS PCT % 71   LYMPHS PCT % 16   MONOS PCT % 10   EOS PCT % 2     Results from last 7 days   Lab Units 24  0814   POTASSIUM mmol/L 3.4*   CHLORIDE mmol/L 110*   CO2 mmol/L 30   BUN mg/dL 20   CREATININE mg/dL 1.00   CALCIUM mg/dL 8.1*   ALK PHOS U/L 63   ALT U/L 3*   AST U/L 43*           Results from last 7 days   Lab Units 24  1159 24  0003 24  1755 24  1139 24  0004 24  2048 24  1619 24  1225 24  0614  01/31/24  0018 01/30/24  1738 01/30/24  1531   POC GLUCOSE mg/dl 106 105 98 119 102 101 120 113 122 113 110 129         Results from last 7 days   Lab Units 02/01/24  0814 01/31/24  0643 01/30/24  0542   PROCALCITONIN ng/ml 0.39* 0.61* 1.03*                   Lines/Drains:  Invasive Devices       Peripherally Inserted Central Catheter Line  Duration             PICC Line 01/29/24 4 days              Drain  Duration             Urethral Catheter Straight-tip 18 Fr. 4 days                      Last 24 Hours Medication List:   Current Facility-Administered Medications   Medication Dose Route Frequency Provider Last Rate    acetaminophen  650 mg Oral Q4H PRN Valentine Brewer MD      albuterol  2 puff Inhalation Q6H PRN ePpe Do MD      cefazolin  2,000 mg Intravenous Q8H AZIZA Wolf 2,000 mg (02/02/24 1703)    chlorhexidine  15 mL Mouth/Throat Q12H [facility] AZIZA Wolf      cholecalciferol  1,000 Units Oral Daily Pepe Do MD      dextrose 5 % and sodium chloride 0.45 %  100 mL/hr Intravenous Continuous Ann Moe  mL/hr (02/02/24 0531)    docusate sodium  100 mg Oral BID PRN Pepe Do MD      hydrocortisone sodium succinate  25 mg Intravenous Q12H [facility] Umesh Kendrick, DO      hydroxychloroquine  200 mg Oral BID Valentine Brewer MD      metoprolol  5 mg Intravenous Q6H PRN AZIZA Wolf      metoprolol tartrate  12.5 mg Oral Q12H [facility] Issac Minor, DO      ondansetron  4 mg Intravenous Q6H PRN Pepe Do MD      pantoprazole  40 mg Oral Early Morning AZIZA Wolf      white petrolatum-mineral oil   Topical TID MD ANN Turner MD   Hospitalist - [facility] Internal Medicine        ** Please Note: This note is constructed using a voice recognition dictation system.  An occasional wrong word/phrase or “sound-a-like” substitution may have been picked up by dictation device due to the inherent limitations  of voice recognition software.  Read the chart carefully and recognize, using reasonable context, where substitutions may have occurred.**

## 2024-02-02 NOTE — SPEECH THERAPY NOTE
Speech Language/Pathology    Speech/Language Pathology Progress Note    Patient Name: Bhavna Al  Today's Date: 2024     Problem List  Principal Problem:    Septic shock (HCC)  Active Problems:    Rheumatoid arthritis (HCC)    Benign essential hypertension    Morbid obesity (HCC)    Staphylococcal scalded skin syndrome    Acute renal failure superimposed on stage 3 chronic kidney disease (HCC)    Paroxysmal atrial fibrillation (HCC)    Pustular psoriasis    Chronic diastolic heart failure (HCC)    Ambulatory dysfunction    Dermatitis associated with incontinence    Chronic kidney disease    Hypernatremia    Encephalopathy    Hematuria    Localized swelling on right hand       Past Medical History  Past Medical History:   Diagnosis Date    Abnormal thyroid function test     last assessed: 2015     Arthritis     Caries     last assessed: 2016     Continuous opioid dependence (HCC) 2021    Edema of right lower extremity     last assessed: 2015     GERD (gastroesophageal reflux disease)     Hypertension     Medicare annual wellness visit, subsequent 2021    Positive blood culture 3/11/2022    Sarcoid         Past Surgical History  Past Surgical History:   Procedure Laterality Date     SECTION      IR PICC PLACEMENT SINGLE LUMEN  2024    MULTIPLE TOOTH EXTRACTIONS N/A 2016    Procedure: Surgical extraction of teeth 2, 18, 19, 30, 31; incision and drainage of left subperiosteal abscess ;  Surgeon: Clara Cevallos DMD;  Location: [facility];  Service:      Subjective:  Patient asleep but wakes upon entry.     Objective:  Patient is seen for dysphagia therapy during lunch meal. She is assessed with regular solids and thin liquids. Materials include grilled cheese and thin liquids. Bite strength is adequate. Mastication and transfer are timely and efficient. Retrieval of thin liquids via straw is adequate. No overt s/s of aspiration observed.      Assessment:  Patient tolerates regular solids and thin liquids.     Plan/Recommendations:  Upgrade diet to regular solids and thin liquids. No further ST warranted. Please reconsult if needed.

## 2024-02-03 LAB
ALBUMIN SERPL BCP-MCNC: 2.5 G/DL (ref 3.5–5)
ALP SERPL-CCNC: 64 U/L (ref 34–104)
ALT SERPL W P-5'-P-CCNC: <3 U/L (ref 7–52)
ANION GAP SERPL CALCULATED.3IONS-SCNC: 4 MMOL/L
AST SERPL W P-5'-P-CCNC: 35 U/L (ref 13–39)
BASOPHILS # BLD AUTO: 0.01 THOUSANDS/ÂΜL (ref 0–0.1)
BASOPHILS NFR BLD AUTO: 0 % (ref 0–1)
BILIRUB SERPL-MCNC: 0.27 MG/DL (ref 0.2–1)
BUN SERPL-MCNC: 16 MG/DL (ref 5–25)
CALCIUM ALBUM COR SERPL-MCNC: 9 MG/DL (ref 8.3–10.1)
CALCIUM SERPL-MCNC: 7.8 MG/DL (ref 8.4–10.2)
CHLORIDE SERPL-SCNC: 109 MMOL/L (ref 96–108)
CO2 SERPL-SCNC: 27 MMOL/L (ref 21–32)
CREAT SERPL-MCNC: 0.99 MG/DL (ref 0.6–1.3)
EOSINOPHIL # BLD AUTO: 0.03 THOUSAND/ÂΜL (ref 0–0.61)
EOSINOPHIL NFR BLD AUTO: 1 % (ref 0–6)
ERYTHROCYTE [DISTWIDTH] IN BLOOD BY AUTOMATED COUNT: 15.5 % (ref 11.6–15.1)
GFR SERPL CREATININE-BSD FRML MDRD: 55 ML/MIN/1.73SQ M
GLUCOSE SERPL-MCNC: 100 MG/DL (ref 65–140)
GLUCOSE SERPL-MCNC: 101 MG/DL (ref 65–140)
GLUCOSE SERPL-MCNC: 109 MG/DL (ref 65–140)
GLUCOSE SERPL-MCNC: 132 MG/DL (ref 65–140)
HCT VFR BLD AUTO: 30.6 % (ref 34.8–46.1)
HGB BLD-MCNC: 9.2 G/DL (ref 11.5–15.4)
IMM GRANULOCYTES # BLD AUTO: 0.06 THOUSAND/UL (ref 0–0.2)
IMM GRANULOCYTES NFR BLD AUTO: 1 % (ref 0–2)
LYMPHOCYTES # BLD AUTO: 0.44 THOUSANDS/ÂΜL (ref 0.6–4.47)
LYMPHOCYTES NFR BLD AUTO: 10 % (ref 14–44)
MCH RBC QN AUTO: 27.5 PG (ref 26.8–34.3)
MCHC RBC AUTO-ENTMCNC: 30.1 G/DL (ref 31.4–37.4)
MCV RBC AUTO: 92 FL (ref 82–98)
MONOCYTES # BLD AUTO: 0.3 THOUSAND/ÂΜL (ref 0.17–1.22)
MONOCYTES NFR BLD AUTO: 7 % (ref 4–12)
NEUTROPHILS # BLD AUTO: 3.5 THOUSANDS/ÂΜL (ref 1.85–7.62)
NEUTS SEG NFR BLD AUTO: 81 % (ref 43–75)
NRBC BLD AUTO-RTO: 0 /100 WBCS
PLATELET # BLD AUTO: 165 THOUSANDS/UL (ref 149–390)
PMV BLD AUTO: 13.4 FL (ref 8.9–12.7)
POTASSIUM SERPL-SCNC: 3.4 MMOL/L (ref 3.5–5.3)
PROT SERPL-MCNC: 5.6 G/DL (ref 6.4–8.4)
RBC # BLD AUTO: 3.34 MILLION/UL (ref 3.81–5.12)
SODIUM SERPL-SCNC: 140 MMOL/L (ref 135–147)
WBC # BLD AUTO: 4.34 THOUSAND/UL (ref 4.31–10.16)

## 2024-02-03 PROCEDURE — 99232 SBSQ HOSP IP/OBS MODERATE 35: CPT | Performed by: INTERNAL MEDICINE

## 2024-02-03 PROCEDURE — 82948 REAGENT STRIP/BLOOD GLUCOSE: CPT

## 2024-02-03 PROCEDURE — 85025 COMPLETE CBC W/AUTO DIFF WBC: CPT | Performed by: STUDENT IN AN ORGANIZED HEALTH CARE EDUCATION/TRAINING PROGRAM

## 2024-02-03 PROCEDURE — 80053 COMPREHEN METABOLIC PANEL: CPT | Performed by: INTERNAL MEDICINE

## 2024-02-03 RX ORDER — POTASSIUM CHLORIDE 20 MEQ/1
40 TABLET, EXTENDED RELEASE ORAL ONCE
Status: COMPLETED | OUTPATIENT
Start: 2024-02-03 | End: 2024-02-03

## 2024-02-03 RX ORDER — POTASSIUM CHLORIDE 20 MEQ/1
20 TABLET, EXTENDED RELEASE ORAL ONCE
Status: COMPLETED | OUTPATIENT
Start: 2024-02-03 | End: 2024-02-03

## 2024-02-03 RX ORDER — FUROSEMIDE 10 MG/ML
40 INJECTION INTRAMUSCULAR; INTRAVENOUS ONCE
Status: COMPLETED | OUTPATIENT
Start: 2024-02-03 | End: 2024-02-03

## 2024-02-03 RX ADMIN — Medication 12.5 MG: at 11:03

## 2024-02-03 RX ADMIN — CHLORHEXIDINE GLUCONATE 15 ML: 1.2 SOLUTION ORAL at 11:04

## 2024-02-03 RX ADMIN — POTASSIUM CHLORIDE 40 MEQ: 1500 TABLET, EXTENDED RELEASE ORAL at 18:33

## 2024-02-03 RX ADMIN — APIXABAN 5 MG: 5 TABLET, FILM COATED ORAL at 11:03

## 2024-02-03 RX ADMIN — Medication 12.5 MG: at 20:34

## 2024-02-03 RX ADMIN — POTASSIUM CHLORIDE 20 MEQ: 1500 TABLET, EXTENDED RELEASE ORAL at 21:11

## 2024-02-03 RX ADMIN — HYDROXYCHLOROQUINE SULFATE 200 MG: 200 TABLET, FILM COATED ORAL at 11:04

## 2024-02-03 RX ADMIN — APIXABAN 5 MG: 5 TABLET, FILM COATED ORAL at 18:35

## 2024-02-03 RX ADMIN — CEFAZOLIN SODIUM 2000 MG: 2 SOLUTION INTRAVENOUS at 23:29

## 2024-02-03 RX ADMIN — FUROSEMIDE 40 MG: 10 INJECTION, SOLUTION INTRAMUSCULAR; INTRAVENOUS at 21:10

## 2024-02-03 RX ADMIN — CEFAZOLIN SODIUM 2000 MG: 2 SOLUTION INTRAVENOUS at 05:29

## 2024-02-03 RX ADMIN — Medication: at 20:44

## 2024-02-03 RX ADMIN — DEXTROSE AND SODIUM CHLORIDE 100 ML/HR: 5; .45 INJECTION, SOLUTION INTRAVENOUS at 10:59

## 2024-02-03 RX ADMIN — HYDROCORTISONE SODIUM SUCCINATE 25 MG: 100 INJECTION, POWDER, FOR SOLUTION INTRAMUSCULAR; INTRAVENOUS at 11:00

## 2024-02-03 RX ADMIN — CEFAZOLIN SODIUM 2000 MG: 2 SOLUTION INTRAVENOUS at 15:19

## 2024-02-03 RX ADMIN — Medication: at 11:04

## 2024-02-03 RX ADMIN — Medication 1000 UNITS: at 11:03

## 2024-02-03 RX ADMIN — PANTOPRAZOLE SODIUM 40 MG: 40 TABLET, DELAYED RELEASE ORAL at 05:24

## 2024-02-03 RX ADMIN — HYDROXYCHLOROQUINE SULFATE 200 MG: 200 TABLET, FILM COATED ORAL at 18:36

## 2024-02-03 RX ADMIN — CHLORHEXIDINE GLUCONATE 15 ML: 1.2 SOLUTION ORAL at 20:34

## 2024-02-03 RX ADMIN — HYDROCORTISONE SODIUM SUCCINATE 25 MG: 100 INJECTION, POWDER, FOR SOLUTION INTRAMUSCULAR; INTRAVENOUS at 20:34

## 2024-02-03 NOTE — PROGRESS NOTES
"[facility]  Progress Note  Name: Bhavna Al I  MRN: [id number]  Unit/Bed#: [facility] 918-01 I Date of Admission: 1/22/2024   Date of Service: 2/3/2024 I Hospital Day: 11      Assessment & Plan:    * Septic shock (HCC)  Assessment & Plan  Septic shock now resolved - due to staphylococcal bacteremia and subsequent cellulitis   Transiently required vasopressor support in the ICU and stress dose steroids, now stabilized and transferred to the medical floor  Continue on IV Ancef per ID recommendations through 2/22  Monitor vitals and maintain hemodynamics  Repeat blood cultures from 1/25 are negative - initial cultures grew multi-strain staphylococcal bacteremia - echocardiogram negative for evidence of endocarditis - see plan for staphylococcal infection below  Procalcitonin trend: 10.15 peak -> 1.03 -> 0.61      Localized swelling on right hand  Assessment & Plan  By reviewing the records previously, patient had a localized swelling of the left upper extremities.  Now with localized swelling of the right hand.  Doppler negative.   XR of hand on 1/28 noting: \"joint space widening and possible erosions involving the first interphalangeal joint. There is also suggestion of soft tissue swelling at the first phalanx. Cannot exclude septic arthropathy in the appropriate clinical setting \"  Previous provider discussed with rheumatology -> recommended to continue with outpatient regimen and monitor  Personally discussed with ID -> if clinically worsens, should pursue and surgery consultation, especially if concern/suspicion increases regarding septic arthropathy  Swelling/tenderness has improved over the last several days with increased range of motion and decreased discomfort    Staphylococcal scalded skin syndrome  Assessment & Plan  Superimposed on possible psoriasis  Continue IV Ancef per infectious disease through 2/22   Initial blood cultures from 1/23 grew both MSSA, Staphylococcus " lugdunensis, and Staphylococcus capitis - repeat blood cultures negative  Monitor skin desquamation - appreciate prior dermatology input     Hematuria  Assessment & Plan  Hematuria currently resolved s/p irrigation  Appreciate urology input  Resumed anticoagulation (Eliquis) today as hematuria resolved     Encephalopathy  Assessment & Plan  Likely toxic metabolic encephalopathy due to septic shock and decreased oral intake  Mentation returning to baseline over the last several days status post IV fluid hydration  Limit/avoid sedating agents as possible  Continue neurochecks     Hypernatremia  Assessment & Plan  Likely secondary to decreased oral intake secondary to altered mentation and dysphagia  Oral diet modified to a puréed consistency with thin liquids -> encourage increased oral intake as recently possible in the setting of encephalopathy  Serum sodium steadily improving after initiation of D5-1/2 NS infusion -> hold diuretics while on IV fluids and monitor for iatrogenic any fluid overload     Acute kidney injury superimposed on chronic kidney disease stage 3 (HCC)  Assessment & Plan  Baseline creatinine of approximately 1.5-1.6 prior peak of 4.0 -> progressively improved currently at 1.0 yesterday  Monitor renal function and urine output - limit/avoid nephrotoxins and hypotension as possible  Restarted on IV fluids, hence, diuretics held     Dermatitis associated with incontinence  Assessment & Plan  Skin moisturization with Eucerin  In order to avoid further skin infection possibly portal of entry being sites of skin breakdown, Corey catheter in place  Voiding trial once the skin lesions have eventually desquamated     Ambulatory dysfunction  Assessment & Plan  Resides in SNF for rehabilitation therapy  Appreciate case management consult     Chronic diastolic heart failure (HCC)  Assessment & Plan  Diuretics currently held in the setting of hyponatremia requiring IV fluid hydration for intravascular volume  depletion - monitor for fluid overload  On beta-blockade with Lopressor     Pustular psoriasis  Assessment & Plan  Known history of biopsy-proven pustular psoriasis  Appreciate dermatology input -> continue current Solu-Cortef regimen  Patient planning by dermatology for biologic treatment     Paroxysmal atrial fibrillation (HCC)  Assessment & Plan  Rate controlled on Lopressor  Anticoagulation (Eliquis) now resumed     Essential hypertension  Assessment & Plan  Continue Lopressor     Rheumatoid arthritis (HCC)  Assessment & Plan  Continue Prednisone/Plaquenil  Outpatient rheumatology follow-up  Condition likely contributory to hand swelling    Morbid obesity (HCC)  Assessment & Plan  BMI of 44.32 currently  Lifestyle/diet modifications      DVT Prophylaxis:  SCDs - holding Eliquis    AM-PAC Basic Mobility:  Basic Mobility Inpatient Raw Score: 6  -[facility] Achieved: 2: Bed activities/Dependent transfer  -[facility] Goal: 2: Bed activities/Dependent transfer    Patient Centered Rounds:  I have performed bedside rounds and discussed plan of care with nursing today.  Discussions with Specialists or Other Care Team Provider:  see above assessments if applicable    Education and Discussions with Family / Patient:  Patient at bedside - daughter aware of ongoing treatment plan    Time Spent for Care:  35 minutes. More than 50% of total time spent on counseling and coordination of care, on one or more of the following: performing physical exam; counseling and coordination of care, obtaining or reviewing history, documenting in the medical record, reviewing/ordering tests/medications/procedures, and communicating with other healthcare professionals.    Current Length of Stay: 11 day(s)  Current Patient Status: Inpatient   Certification Statement:  Patient will continue to require additional hospital stay due to assessments as noted above.    Code Status: Level 1 - Full Code        Subjective:     Seen and examined earlier this  morning.  Patient remains in pleasant spirits.  She reports right and right hand swelling along with increased range of motion.        Objective:     Vitals:   Temp (24hrs), Av.7 °F (37.1 °C), Min:98.2 °F (36.8 °C), Max:99 °F (37.2 °C)    Temp:  [98.2 °F (36.8 °C)-99 °F (37.2 °C)] 99 °F (37.2 °C)  HR:  [71-99] 71  Resp:  [18-20] 20  BP: (133-145)/(65-66) 133/65  SpO2:  [97 %-99 %] 97 %  Body mass index is 44.32 kg/m².     Input and Output Summary (last 24 hours):       Intake/Output Summary (Last 24 hours) at 2/3/2024 1650  Last data filed at 2/3/2024 1339  Gross per 24 hour   Intake 2831 ml   Output 400 ml   Net 2431 ml       Physical Exam:     GENERAL Obese - improved weakness/fatigue   HEAD   Normocephalic - atraumatic   EYES Nonicteric   MOUTH   Mucosa moist   NECK Full range of motion and supple   CARDIAC Rate controlled   PULMONARY    Somewhat diminished breath sounds due to decreased body habitus - nonlabored respirations at rest   ABDOMEN   Soft - nontender/nondistended - active bowel sounds   MUSCULOSKELETAL   Motor strength/range of motion markedly deconditioned - bilateral hand swelling improving (R>L)   NEUROLOGIC Alert/oriented at baseline currently   SKIN   Chronic wrinkles/blemishes - diffuse areas of pustular psoriasis and superimposed patches of the desquamation   PSYCHIATRIC   Mood/affect stable          Additional Data:     Labs & Recent Cultures:    Results from last 7 days   Lab Units 24  0535   WBC Thousand/uL 4.34   HEMOGLOBIN g/dL 9.2*   HEMATOCRIT % 30.6*   PLATELETS Thousands/uL 165   NEUTROS PCT % 81*   LYMPHS PCT % 10*   MONOS PCT % 7   EOS PCT % 1     Results from last 7 days   Lab Units 24  0530   POTASSIUM mmol/L 3.4*   CHLORIDE mmol/L 109*   CO2 mmol/L 27   BUN mg/dL 16   CREATININE mg/dL 0.99   CALCIUM mg/dL 7.8*   ALK PHOS U/L 64   ALT U/L <3*   AST U/L 35           Results from last 7 days   Lab Units 24  1118 24  0008 24  1159 24  0003  02/01/24  1755 02/01/24  1139 02/01/24  0004 01/31/24  2048 01/31/24  1619 01/31/24  1225 01/31/24  0614 01/31/24  0018   POC GLUCOSE mg/dl 109 132 106 105 98 119 102 101 120 113 122 113         Results from last 7 days   Lab Units 02/01/24  0814 01/31/24  0643 01/30/24  0542   PROCALCITONIN ng/ml 0.39* 0.61* 1.03*                   Lines/Drains:  Invasive Devices       Peripherally Inserted Central Catheter Line  Duration             PICC Line 01/29/24 4 days              Drain  Duration             Urethral Catheter Straight-tip 18 Fr. 5 days                      Last 24 Hours Medication List:   Current Facility-Administered Medications   Medication Dose Route Frequency Provider Last Rate    acetaminophen  650 mg Oral Q4H PRN Valentine Brewer MD      albuterol  2 puff Inhalation Q6H PRN Pepe Do MD      apixaban  5 mg Oral BID Ann Moe MD      cefazolin  2,000 mg Intravenous Q8H AZIZA Wolf 2,000 mg (02/03/24 1519)    chlorhexidine  15 mL Mouth/Throat Q12H FAMILIA AZIZA Wolf      cholecalciferol  1,000 Units Oral Daily Pepe Do MD      dextrose 5 % and sodium chloride 0.45 %  100 mL/hr Intravenous Continuous Ann Moe  mL/hr (02/03/24 1059)    docusate sodium  100 mg Oral BID PRN Pepe Do MD      hydrocortisone sodium succinate  25 mg Intravenous Q12H [facility] Umesh Kendrick, DO      hydroxychloroquine  200 mg Oral BID Valentine Brewer MD      metoprolol  5 mg Intravenous Q6H PRN AZIZA Wolf      metoprolol tartrate  12.5 mg Oral Q12H [facility] Issac Minor DO      ondansetron  4 mg Intravenous Q6H PRN Pepe Do MD      pantoprazole  40 mg Oral Early Morning AZIZA Wolf      potassium chloride  40 mEq Oral Once Ann Moe MD      white petrolatum-mineral oil   Topical TID MD ANN Turner MD   Hospitalist - [facility]        ** Please Note: This note is constructed using  a voice recognition dictation system.  An occasional wrong word/phrase or “sound-a-like” substitution may have been picked up by dictation device due to the inherent limitations of voice recognition software.  Read the chart carefully and recognize, using reasonable context, where substitutions may have occurred.**

## 2024-02-04 ENCOUNTER — APPOINTMENT (INPATIENT)
Dept: RADIOLOGY | Facility: HOSPITAL | Age: 74
DRG: 871 | End: 2024-02-04
Payer: COMMERCIAL

## 2024-02-04 LAB
ALBUMIN SERPL BCP-MCNC: 2.6 G/DL (ref 3.5–5)
ALP SERPL-CCNC: 72 U/L (ref 34–104)
ALT SERPL W P-5'-P-CCNC: <3 U/L (ref 7–52)
ANION GAP SERPL CALCULATED.3IONS-SCNC: 6 MMOL/L
AST SERPL W P-5'-P-CCNC: 33 U/L (ref 13–39)
BASOPHILS # BLD AUTO: 0.02 THOUSANDS/ÂΜL (ref 0–0.1)
BASOPHILS NFR BLD AUTO: 0 % (ref 0–1)
BILIRUB SERPL-MCNC: 0.29 MG/DL (ref 0.2–1)
BUN SERPL-MCNC: 16 MG/DL (ref 5–25)
CALCIUM ALBUM COR SERPL-MCNC: 9.5 MG/DL (ref 8.3–10.1)
CALCIUM SERPL-MCNC: 8.4 MG/DL (ref 8.4–10.2)
CHLORIDE SERPL-SCNC: 110 MMOL/L (ref 96–108)
CO2 SERPL-SCNC: 25 MMOL/L (ref 21–32)
CREAT SERPL-MCNC: 1 MG/DL (ref 0.6–1.3)
EOSINOPHIL # BLD AUTO: 0.06 THOUSAND/ÂΜL (ref 0–0.61)
EOSINOPHIL NFR BLD AUTO: 1 % (ref 0–6)
ERYTHROCYTE [DISTWIDTH] IN BLOOD BY AUTOMATED COUNT: 15.5 % (ref 11.6–15.1)
GFR SERPL CREATININE-BSD FRML MDRD: 55 ML/MIN/1.73SQ M
GLUCOSE SERPL-MCNC: 100 MG/DL (ref 65–140)
GLUCOSE SERPL-MCNC: 111 MG/DL (ref 65–140)
GLUCOSE SERPL-MCNC: 71 MG/DL (ref 65–140)
GLUCOSE SERPL-MCNC: 78 MG/DL (ref 65–140)
GLUCOSE SERPL-MCNC: 98 MG/DL (ref 65–140)
HCT VFR BLD AUTO: 32.6 % (ref 34.8–46.1)
HGB BLD-MCNC: 10 G/DL (ref 11.5–15.4)
IMM GRANULOCYTES # BLD AUTO: 0.05 THOUSAND/UL (ref 0–0.2)
IMM GRANULOCYTES NFR BLD AUTO: 1 % (ref 0–2)
LYMPHOCYTES # BLD AUTO: 0.56 THOUSANDS/ÂΜL (ref 0.6–4.47)
LYMPHOCYTES NFR BLD AUTO: 12 % (ref 14–44)
MCH RBC QN AUTO: 27.5 PG (ref 26.8–34.3)
MCHC RBC AUTO-ENTMCNC: 30.7 G/DL (ref 31.4–37.4)
MCV RBC AUTO: 90 FL (ref 82–98)
MONOCYTES # BLD AUTO: 0.31 THOUSAND/ÂΜL (ref 0.17–1.22)
MONOCYTES NFR BLD AUTO: 7 % (ref 4–12)
NEUTROPHILS # BLD AUTO: 3.57 THOUSANDS/ÂΜL (ref 1.85–7.62)
NEUTS SEG NFR BLD AUTO: 79 % (ref 43–75)
NRBC BLD AUTO-RTO: 0 /100 WBCS
PLATELET # BLD AUTO: 188 THOUSANDS/UL (ref 149–390)
PMV BLD AUTO: 13 FL (ref 8.9–12.7)
POTASSIUM SERPL-SCNC: 4 MMOL/L (ref 3.5–5.3)
PROT SERPL-MCNC: 5.9 G/DL (ref 6.4–8.4)
RBC # BLD AUTO: 3.63 MILLION/UL (ref 3.81–5.12)
SODIUM SERPL-SCNC: 141 MMOL/L (ref 135–147)
WBC # BLD AUTO: 4.57 THOUSAND/UL (ref 4.31–10.16)

## 2024-02-04 PROCEDURE — 71045 X-RAY EXAM CHEST 1 VIEW: CPT

## 2024-02-04 PROCEDURE — 85025 COMPLETE CBC W/AUTO DIFF WBC: CPT | Performed by: STUDENT IN AN ORGANIZED HEALTH CARE EDUCATION/TRAINING PROGRAM

## 2024-02-04 PROCEDURE — 82948 REAGENT STRIP/BLOOD GLUCOSE: CPT

## 2024-02-04 PROCEDURE — 80053 COMPREHEN METABOLIC PANEL: CPT | Performed by: INTERNAL MEDICINE

## 2024-02-04 PROCEDURE — 99232 SBSQ HOSP IP/OBS MODERATE 35: CPT | Performed by: INTERNAL MEDICINE

## 2024-02-04 RX ORDER — FUROSEMIDE 40 MG/1
40 TABLET ORAL DAILY
Status: DISCONTINUED | OUTPATIENT
Start: 2024-02-04 | End: 2024-02-07

## 2024-02-04 RX ADMIN — HYDROXYCHLOROQUINE SULFATE 200 MG: 200 TABLET, FILM COATED ORAL at 20:39

## 2024-02-04 RX ADMIN — Medication 12.5 MG: at 13:19

## 2024-02-04 RX ADMIN — CEFAZOLIN SODIUM 2000 MG: 2 SOLUTION INTRAVENOUS at 13:12

## 2024-02-04 RX ADMIN — CEFAZOLIN SODIUM 2000 MG: 2 SOLUTION INTRAVENOUS at 05:00

## 2024-02-04 RX ADMIN — CEFAZOLIN SODIUM 2000 MG: 2 SOLUTION INTRAVENOUS at 21:40

## 2024-02-04 RX ADMIN — PANTOPRAZOLE SODIUM 40 MG: 40 TABLET, DELAYED RELEASE ORAL at 04:45

## 2024-02-04 RX ADMIN — Medication: at 23:05

## 2024-02-04 RX ADMIN — FUROSEMIDE 40 MG: 40 TABLET ORAL at 13:20

## 2024-02-04 RX ADMIN — HYDROCORTISONE SODIUM SUCCINATE 25 MG: 100 INJECTION, POWDER, FOR SOLUTION INTRAMUSCULAR; INTRAVENOUS at 13:12

## 2024-02-04 RX ADMIN — HYDROCORTISONE SODIUM SUCCINATE 25 MG: 100 INJECTION, POWDER, FOR SOLUTION INTRAMUSCULAR; INTRAVENOUS at 20:40

## 2024-02-04 RX ADMIN — Medication: at 13:22

## 2024-02-04 RX ADMIN — CHLORHEXIDINE GLUCONATE 15 ML: 1.2 SOLUTION ORAL at 13:22

## 2024-02-04 RX ADMIN — Medication 12.5 MG: at 20:39

## 2024-02-04 RX ADMIN — ALTEPLASE 2 MG: 2.2 INJECTION, POWDER, LYOPHILIZED, FOR SOLUTION INTRAVENOUS at 02:45

## 2024-02-04 RX ADMIN — Medication 1000 UNITS: at 13:19

## 2024-02-04 RX ADMIN — HYDROXYCHLOROQUINE SULFATE 200 MG: 200 TABLET, FILM COATED ORAL at 13:23

## 2024-02-04 RX ADMIN — CHLORHEXIDINE GLUCONATE 15 ML: 1.2 SOLUTION ORAL at 20:39

## 2024-02-04 RX ADMIN — APIXABAN 5 MG: 5 TABLET, FILM COATED ORAL at 13:20

## 2024-02-04 RX ADMIN — APIXABAN 5 MG: 5 TABLET, FILM COATED ORAL at 20:39

## 2024-02-04 NOTE — ASSESSMENT & PLAN NOTE
Superimposed on possible psoriasis  Continue IV Ancef per infectious disease through 2/22   Initial blood cultures from 1/23 grew both MSSA, Staphylococcus lugdunensis, and Staphylococcus capitis - repeat blood cultures negative  Monitor skin desquamation - appreciate prior dermatology input

## 2024-02-04 NOTE — ASSESSMENT & PLAN NOTE
Continue Prednisone/Plaquenil  Outpatient rheumatology follow-up  Condition likely contributory to hand swelling

## 2024-02-04 NOTE — ASSESSMENT & PLAN NOTE
Likely secondary to decreased oral intake secondary to altered mentation and dysphagia  Oral diet modified to a puréed consistency with thin liquids -> encourage increased oral intake as recently possible in the setting of encephalopathy  Sodium normalized after a trial of a D5-1/2 NS infusion -> now discontinued due to mild fluid overload in the setting of CHF

## 2024-02-04 NOTE — ASSESSMENT & PLAN NOTE
Diuretics (Lasix) now resumed due to mild fluid overload from the last several days of IV fluids  Continue beta-blockade with Lopressor  Fluid restriction enforced  CXR from the morning of 2/4 (on personal review) with some pulmonary vascular congestion more prominent at the bases

## 2024-02-04 NOTE — ASSESSMENT & PLAN NOTE
Septic shock now resolved - due to staphylococcal bacteremia and subsequent cellulitis   Transiently required vasopressor support in the ICU and stress dose steroids, now stabilized and transferred to the medical floor  Continue on IV Ancef per ID recommendations through 2/22  Monitor vitals and maintain hemodynamics  Repeat blood cultures from 1/25 are negative - initial cultures grew multi-strain staphylococcal bacteremia - echocardiogram negative for evidence of endocarditis - see plan for staphylococcal infection below  Procalcitonin trend: 10.15 peak -> 1.03 -> 0.61

## 2024-02-04 NOTE — ASSESSMENT & PLAN NOTE
Skin moisturization with Eucerin  In order to avoid further skin infection possibly portal of entry being sites of skin breakdown, Corey catheter in place  Voiding trial once the skin lesions have eventually desquamated

## 2024-02-04 NOTE — PLAN OF CARE
Problem: PAIN - ADULT  Goal: Verbalizes/displays adequate comfort level or baseline comfort level  Description: Interventions:  - Encourage patient to monitor pain and request assistance  - Assess pain using appropriate pain scale  - Administer analgesics based on type and severity of pain and evaluate response  - Implement non-pharmacological measures as appropriate and evaluate response  - Consider cultural and social influences on pain and pain management  - Notify physician/advanced practitioner if interventions unsuccessful or patient reports new pain  Outcome: Progressing     Problem: INFECTION - ADULT  Goal: Absence or prevention of progression during hospitalization  Description: INTERVENTIONS:  - Assess and monitor for signs and symptoms of infection  - Monitor lab/diagnostic results  - Monitor all insertion sites, i.e. indwelling lines, tubes, and drains  - Monitor endotracheal if appropriate and nasal secretions for changes in amount and color  - [place] appropriate cooling/warming therapies per order  - Administer medications as ordered  - Instruct and encourage patient and family to use good hand hygiene technique  - Identify and instruct in appropriate isolation precautions for identified infection/condition  Outcome: Progressing     Problem: Knowledge Deficit  Goal: Patient/family/caregiver demonstrates understanding of disease process, treatment plan, medications, and discharge instructions  Description: Complete learning assessment and assess knowledge base.  Interventions:  - Provide teaching at level of understanding  - Provide teaching via preferred learning methods  Outcome: Progressing     Problem: CARDIOVASCULAR - ADULT  Goal: Maintains optimal cardiac output and hemodynamic stability  Description: INTERVENTIONS:  - Monitor I/O, vital signs and rhythm  - Monitor for S/S and trends of decreased cardiac output  - Administer and titrate ordered vasoactive medications to optimize hemodynamic  stability  - Assess quality of pulses, skin color and temperature  - Assess for signs of decreased coronary artery perfusion  - Instruct patient to report change in severity of symptoms  Outcome: Progressing

## 2024-02-04 NOTE — ASSESSMENT & PLAN NOTE
Likely toxic metabolic encephalopathy due to septic shock and decreased oral intake  Mentation returning to baseline over the last several days status post IV fluid hydration  Limit/avoid sedating agents as possible  Continue neurochecks

## 2024-02-04 NOTE — ASSESSMENT & PLAN NOTE
Known history of biopsy-proven pustular psoriasis  Appreciate dermatology input -> continue current Solu-Cortef regimen  Patient planning by dermatology for biologic treatment

## 2024-02-04 NOTE — PROGRESS NOTES
"[facility]  Progress Note  Name: Bhavna Al I  MRN: [id number]  Unit/Bed#: [facility] 918-01 I Date of Admission: 1/22/2024   Date of Service: 2/4/2024 I Hospital Day: 12      Assessment & Plan:    * Septic shock (HCC)  Assessment & Plan  Septic shock now resolved - due to staphylococcal bacteremia and subsequent cellulitis   Transiently required vasopressor support in the ICU and stress dose steroids, now stabilized and transferred to the medical floor  Continue on IV Ancef per ID recommendations through 2/22  Monitor vitals and maintain hemodynamics  Repeat blood cultures from 1/25 are negative - initial cultures grew multi-strain staphylococcal bacteremia - echocardiogram negative for evidence of endocarditis - see plan for staphylococcal infection below  Procalcitonin trend: 10.15 peak -> 1.03 -> 0.61     Localized swelling on right hand  Assessment & Plan  By reviewing the records previously, patient had a localized swelling of the left upper extremities.  Now with localized swelling of the right hand.  Doppler negative.   XR of hand on 1/28 noting: \"joint space widening and possible erosions involving the first interphalangeal joint. There is also suggestion of soft tissue swelling at the first phalanx. Cannot exclude septic arthropathy in the appropriate clinical setting \"  Previous provider discussed with rheumatology -> recommended to continue with outpatient regimen and monitor  Personally discussed with ID -> if clinically worsens, should pursue and surgery consultation, especially if concern/suspicion increases regarding septic arthropathy  Swelling/tenderness has improved over the last several days with increased range of motion and decreased discomfort    Staphylococcal scalded skin syndrome  Assessment & Plan  Superimposed on possible psoriasis  Continue IV Ancef per infectious disease through 2/22   Initial blood cultures from 1/23 grew both MSSA, Staphylococcus " lugdunensis, and Staphylococcus capitis - repeat blood cultures negative  Monitor skin desquamation - appreciate prior dermatology input    Hematuria  Assessment & Plan  Hematuria currently resolved s/p irrigation  Appreciate urology input  Resumed anticoagulation (Eliquis)     Encephalopathy  Assessment & Plan  Likely toxic metabolic encephalopathy due to septic shock and decreased oral intake  Mentation returning to baseline over the last several days status post IV fluid hydration  Limit/avoid sedating agents as possible  Continue neurochecks    Hypernatremia  Assessment & Plan  Likely secondary to decreased oral intake secondary to altered mentation and dysphagia  Oral diet modified to a puréed consistency with thin liquids -> encourage increased oral intake as recently possible in the setting of encephalopathy  Sodium normalized after a trial of a D5-1/2 NS infusion -> now discontinued due to mild fluid overload in the setting of CHF    Acute renal failure superimposed on stage 3 chronic kidney disease (HCC)  Assessment & Plan  Baseline creatinine of approximately 1.5-1.6 prior peak of 4.0 -> progressively improved currently at 1.0  Monitor renal function and urine output - limit/avoid nephrotoxins and hypotension as possible  Diuretics (Lasix) resumed now    Dermatitis associated with incontinence  Assessment & Plan  Skin moisturization with Eucerin  In order to avoid further skin infection possibly portal of entry being sites of skin breakdown, Corey catheter in place  Voiding trial once the skin lesions have eventually desquamated    Chronic diastolic heart failure (HCC)  Assessment & Plan  Diuretics (Lasix) now resumed due to mild fluid overload from the last several days of IV fluids  Continue beta-blockade with Lopressor  Fluid restriction enforced  CXR from the morning of 2/4 (on personal review) with some pulmonary vascular congestion more prominent at the bases    Pustular psoriasis  Assessment &  Plan  Known history of biopsy-proven pustular psoriasis  Appreciate dermatology input -> continue current Solu-Cortef regimen  Patient planning by dermatology for biologic treatment    Paroxysmal atrial fibrillation (HCC)  Assessment & Plan  Rate controlled on Lopressor  Anticoagulation (Eliquis) now resumed    Essential hypertension  Assessment & Plan  Continue Lopressor    Rheumatoid arthritis (HCC)  Assessment & Plan  Continue Prednisone/Plaquenil  Outpatient rheumatology follow-up  Condition likely contributory to hand swelling    Morbid obesity (HCC)  Assessment & Plan  BMI of 44.13 currently  Lifestyle/diet modifications      DVT Prophylaxis:  Eliquis    AM-PAC Basic Mobility:  Basic Mobility Inpatient Raw Score: 6  JH-HLM Achieved: 2: Bed activities/Dependent transfer  -HLM Goal: 2: Bed activities/Dependent transfer    Patient Centered Rounds:  I have performed bedside rounds and discussed plan of care with nursing today.  Discussions with Specialists or Other Care Team Provider:  see above assessments if applicable    Education and Discussions with Family / Patient: Patient at bedside, along with daughterRhoda over the phone today     Time Spent for Care:  35 minutes. More than 50% of total time spent on counseling and coordination of care, on one or more of the following: performing physical exam; counseling and coordination of care, obtaining or reviewing history, documenting in the medical record, reviewing/ordering tests/medications/procedures, and communicating with other healthcare professionals.    Current Length of Stay: 12 day(s)  Current Patient Status: Inpatient   Certification Statement:  Patient will continue to require additional hospital stay due to assessments as noted above.    Code Status: Level 1 - Full Code        Subjective:     Encountered earlier in the morning.  Mild shortness of breath overnight noted, now improved with resuming diuretics, and discontinuing IV fluids.  Continues  to report improvement in right hand/wrist swelling and generalized range of motion.  She remains in pleasant spirits currently.        Objective:     Vitals:   Temp (24hrs), Av.5 °F (36.9 °C), Min:98.1 °F (36.7 °C), Max:98.8 °F (37.1 °C)    Temp:  [98.1 °F (36.7 °C)-98.8 °F (37.1 °C)] 98.6 °F (37 °C)  HR:  [70-83] 79  Resp:  [16-20] 16  BP: (115-144)/(65-73) 115/65  SpO2:  [97 %-100 %] 100 %  Body mass index is 44.13 kg/m².     Input and Output Summary (last 24 hours):       Intake/Output Summary (Last 24 hours) at 2024 1734  Last data filed at 2024 1342  Gross per 24 hour   Intake 900 ml   Output 1675 ml   Net -775 ml       Physical Exam:     GENERAL Waxing/waning weakness and fatigue - obese   HEAD   Normocephalic - atraumatic   EYES   PERRL - EOMI    MOUTH   Mucosa moist   NECK   Supple - full range of motion   CARDIAC Rate controlled - S1/S2 positive   PULMONARY Mildly diminished at the bases with decreased respiratory effort due to body habitus - nonlabored respirations   ABDOMEN   Soft - nontender/nondistended - active bowel sounds   MUSCULOSKELETAL   Motor strength/range of motion remains deconditioned -improved bilateral hand swelling (R>L)   NEUROLOGIC   Alert/oriented at baseline currently   SKIN   Chronic wrinkles/blemishes - diffuse areas of psoriasis and superimposed patches of desquamation of skin   PSYCHIATRIC   Mood/affect pleasant         Additional Data:     Labs & Recent Cultures:    Results from last 7 days   Lab Units 24  0454   WBC Thousand/uL 4.57   HEMOGLOBIN g/dL 10.0*   HEMATOCRIT % 32.6*   PLATELETS Thousands/uL 188   NEUTROS PCT % 79*   LYMPHS PCT % 12*   MONOS PCT % 7   EOS PCT % 1     Results from last 7 days   Lab Units 24  0454   POTASSIUM mmol/L 4.0   CHLORIDE mmol/L 110*   CO2 mmol/L 25   BUN mg/dL 16   CREATININE mg/dL 1.00   CALCIUM mg/dL 8.4   ALK PHOS U/L 72   ALT U/L <3*   AST U/L 33         Results from last 7 days   Lab Units 24  9746  02/04/24  0727 02/04/24  0100 02/03/24  1743 02/03/24  1118 02/03/24  0008 02/02/24  1159 02/02/24  0003 02/01/24  1755 02/01/24  1139 02/01/24  0004 01/31/24  2048   POC GLUCOSE mg/dl 100 78 98 100 109 132 106 105 98 119 102 101         Results from last 7 days   Lab Units 02/01/24  0814 01/31/24  0643 01/30/24  0542   PROCALCITONIN ng/ml 0.39* 0.61* 1.03*                 Lines/Drains:  Invasive Devices       Peripherally Inserted Central Catheter Line  Duration             PICC Line 01/29/24 6 days              Drain  Duration             Urethral Catheter Straight-tip 18 Fr. 6 days                      Last 24 Hours Medication List:   Current Facility-Administered Medications   Medication Dose Route Frequency Provider Last Rate    acetaminophen  650 mg Oral Q4H PRN Valentine Brewer MD      albuterol  2 puff Inhalation Q6H PRN Pepe Do MD      apixaban  5 mg Oral BID Ann Moe MD      cefazolin  2,000 mg Intravenous Q8H AZIZA Wolf Stopped (02/04/24 1342)    chlorhexidine  15 mL Mouth/Throat Q12H [facility] AZIZA Wolf      cholecalciferol  1,000 Units Oral Daily Pepe Do MD      docusate sodium  100 mg Oral BID PRN Pepe Do MD      furosemide  40 mg Oral Daily Ann Moe MD      hydrocortisone sodium succinate  25 mg Intravenous Q12H [facility] Umesh Kendrick,       hydroxychloroquine  200 mg Oral BID Valentine Brewer MD      metoprolol  5 mg Intravenous Q6H PRN AZZIA Wolf      metoprolol tartrate  12.5 mg Oral Q12H [facility] Issac Minor, DO      ondansetron  4 mg Intravenous Q6H PRN Pepe Do MD      pantoprazole  40 mg Oral Early Morning AZIZA Wolf      white petrolatum-mineral oil   Topical TID MD ANN Turner MD   Hospitalist - [facility]        ** Please Note: This note is constructed using a voice recognition dictation system.  An occasional wrong word/phrase or  “sound-a-like” substitution may have been picked up by dictation device due to the inherent limitations of voice recognition software.  Read the chart carefully and recognize, using reasonable context, where substitutions may have occurred.**

## 2024-02-04 NOTE — ASSESSMENT & PLAN NOTE
Baseline creatinine of approximately 1.5-1.6 prior peak of 4.0 -> progressively improved currently at 1.0  Monitor renal function and urine output - limit/avoid nephrotoxins and hypotension as possible  Diuretics (Lasix) resumed now

## 2024-02-04 NOTE — ASSESSMENT & PLAN NOTE
"By reviewing the records previously, patient had a localized swelling of the left upper extremities.  Now with localized swelling of the right hand.  Doppler negative.   XR of hand on 1/28 noting: \"joint space widening and possible erosions involving the first interphalangeal joint. There is also suggestion of soft tissue swelling at the first phalanx. Cannot exclude septic arthropathy in the appropriate clinical setting \"  Previous provider discussed with rheumatology -> recommended to continue with outpatient regimen and monitor  Personally discussed with ID -> if clinically worsens, should pursue and surgery consultation, especially if concern/suspicion increases regarding septic arthropathy  Swelling/tenderness has improved over the last several days with increased range of motion and decreased discomfort  "

## 2024-02-04 NOTE — ASSESSMENT & PLAN NOTE
Hematuria currently resolved s/p irrigation  Appreciate urology input  Resumed anticoagulation (Eliquis)

## 2024-02-05 LAB
ANION GAP SERPL CALCULATED.3IONS-SCNC: 9 MMOL/L
BASOPHILS # BLD AUTO: 0.02 THOUSANDS/ÂΜL (ref 0–0.1)
BASOPHILS NFR BLD AUTO: 0 % (ref 0–1)
BUN SERPL-MCNC: 19 MG/DL (ref 5–25)
CALCIUM SERPL-MCNC: 8.7 MG/DL (ref 8.4–10.2)
CHLORIDE SERPL-SCNC: 108 MMOL/L (ref 96–108)
CO2 SERPL-SCNC: 26 MMOL/L (ref 21–32)
CREAT SERPL-MCNC: 1.04 MG/DL (ref 0.6–1.3)
EOSINOPHIL # BLD AUTO: 0.03 THOUSAND/ÂΜL (ref 0–0.61)
EOSINOPHIL NFR BLD AUTO: 1 % (ref 0–6)
ERYTHROCYTE [DISTWIDTH] IN BLOOD BY AUTOMATED COUNT: 15.6 % (ref 11.6–15.1)
GFR SERPL CREATININE-BSD FRML MDRD: 52 ML/MIN/1.73SQ M
GLUCOSE SERPL-MCNC: 118 MG/DL (ref 65–140)
GLUCOSE SERPL-MCNC: 127 MG/DL (ref 65–140)
GLUCOSE SERPL-MCNC: 75 MG/DL (ref 65–140)
GLUCOSE SERPL-MCNC: 80 MG/DL (ref 65–140)
GLUCOSE SERPL-MCNC: 90 MG/DL (ref 65–140)
HCT VFR BLD AUTO: 34.2 % (ref 34.8–46.1)
HGB BLD-MCNC: 10.2 G/DL (ref 11.5–15.4)
IMM GRANULOCYTES # BLD AUTO: 0.05 THOUSAND/UL (ref 0–0.2)
IMM GRANULOCYTES NFR BLD AUTO: 1 % (ref 0–2)
LYMPHOCYTES # BLD AUTO: 0.49 THOUSANDS/ÂΜL (ref 0.6–4.47)
LYMPHOCYTES NFR BLD AUTO: 9 % (ref 14–44)
MAGNESIUM SERPL-MCNC: 1.6 MG/DL (ref 1.9–2.7)
MCH RBC QN AUTO: 27.3 PG (ref 26.8–34.3)
MCHC RBC AUTO-ENTMCNC: 29.8 G/DL (ref 31.4–37.4)
MCV RBC AUTO: 92 FL (ref 82–98)
MONOCYTES # BLD AUTO: 0.3 THOUSAND/ÂΜL (ref 0.17–1.22)
MONOCYTES NFR BLD AUTO: 6 % (ref 4–12)
NEUTROPHILS # BLD AUTO: 4.4 THOUSANDS/ÂΜL (ref 1.85–7.62)
NEUTS SEG NFR BLD AUTO: 83 % (ref 43–75)
NRBC BLD AUTO-RTO: 0 /100 WBCS
PLATELET # BLD AUTO: 218 THOUSANDS/UL (ref 149–390)
PMV BLD AUTO: 13.3 FL (ref 8.9–12.7)
POTASSIUM SERPL-SCNC: 3.8 MMOL/L (ref 3.5–5.3)
RBC # BLD AUTO: 3.73 MILLION/UL (ref 3.81–5.12)
SODIUM SERPL-SCNC: 143 MMOL/L (ref 135–147)
WBC # BLD AUTO: 5.29 THOUSAND/UL (ref 4.31–10.16)

## 2024-02-05 PROCEDURE — 80048 BASIC METABOLIC PNL TOTAL CA: CPT | Performed by: INTERNAL MEDICINE

## 2024-02-05 PROCEDURE — 99232 SBSQ HOSP IP/OBS MODERATE 35: CPT | Performed by: INTERNAL MEDICINE

## 2024-02-05 PROCEDURE — 82948 REAGENT STRIP/BLOOD GLUCOSE: CPT

## 2024-02-05 PROCEDURE — 83735 ASSAY OF MAGNESIUM: CPT | Performed by: INTERNAL MEDICINE

## 2024-02-05 PROCEDURE — 85025 COMPLETE CBC W/AUTO DIFF WBC: CPT | Performed by: STUDENT IN AN ORGANIZED HEALTH CARE EDUCATION/TRAINING PROGRAM

## 2024-02-05 PROCEDURE — 99233 SBSQ HOSP IP/OBS HIGH 50: CPT | Performed by: INTERNAL MEDICINE

## 2024-02-05 RX ORDER — MAGNESIUM SULFATE HEPTAHYDRATE 40 MG/ML
2 INJECTION, SOLUTION INTRAVENOUS ONCE
Status: COMPLETED | OUTPATIENT
Start: 2024-02-05 | End: 2024-02-05

## 2024-02-05 RX ORDER — POTASSIUM CHLORIDE 20 MEQ/1
40 TABLET, EXTENDED RELEASE ORAL ONCE
Status: COMPLETED | OUTPATIENT
Start: 2024-02-05 | End: 2024-02-05

## 2024-02-05 RX ADMIN — HYDROCORTISONE SODIUM SUCCINATE 25 MG: 100 INJECTION, POWDER, FOR SOLUTION INTRAMUSCULAR; INTRAVENOUS at 09:40

## 2024-02-05 RX ADMIN — APIXABAN 5 MG: 5 TABLET, FILM COATED ORAL at 17:32

## 2024-02-05 RX ADMIN — Medication 12.5 MG: at 21:58

## 2024-02-05 RX ADMIN — APIXABAN 5 MG: 5 TABLET, FILM COATED ORAL at 09:40

## 2024-02-05 RX ADMIN — CHLORHEXIDINE GLUCONATE 15 ML: 1.2 SOLUTION ORAL at 09:40

## 2024-02-05 RX ADMIN — Medication: at 21:58

## 2024-02-05 RX ADMIN — Medication 1000 UNITS: at 09:40

## 2024-02-05 RX ADMIN — MAGNESIUM SULFATE HEPTAHYDRATE 2 G: 40 INJECTION, SOLUTION INTRAVENOUS at 17:32

## 2024-02-05 RX ADMIN — CEFAZOLIN SODIUM 2000 MG: 2 SOLUTION INTRAVENOUS at 21:55

## 2024-02-05 RX ADMIN — FUROSEMIDE 40 MG: 40 TABLET ORAL at 09:40

## 2024-02-05 RX ADMIN — Medication: at 09:42

## 2024-02-05 RX ADMIN — CEFAZOLIN SODIUM 2000 MG: 2 SOLUTION INTRAVENOUS at 05:34

## 2024-02-05 RX ADMIN — POTASSIUM CHLORIDE 40 MEQ: 1500 TABLET, EXTENDED RELEASE ORAL at 17:31

## 2024-02-05 RX ADMIN — Medication: at 15:02

## 2024-02-05 RX ADMIN — CEFAZOLIN SODIUM 2000 MG: 2 SOLUTION INTRAVENOUS at 15:02

## 2024-02-05 RX ADMIN — CHLORHEXIDINE GLUCONATE 15 ML: 1.2 SOLUTION ORAL at 21:55

## 2024-02-05 RX ADMIN — PANTOPRAZOLE SODIUM 40 MG: 40 TABLET, DELAYED RELEASE ORAL at 05:33

## 2024-02-05 RX ADMIN — HYDROCORTISONE SODIUM SUCCINATE 25 MG: 100 INJECTION, POWDER, FOR SOLUTION INTRAMUSCULAR; INTRAVENOUS at 21:55

## 2024-02-05 RX ADMIN — HYDROXYCHLOROQUINE SULFATE 200 MG: 200 TABLET, FILM COATED ORAL at 09:42

## 2024-02-05 RX ADMIN — Medication 12.5 MG: at 09:40

## 2024-02-05 RX ADMIN — HYDROXYCHLOROQUINE SULFATE 200 MG: 200 TABLET, FILM COATED ORAL at 18:16

## 2024-02-05 NOTE — PLAN OF CARE
Problem: PAIN - ADULT  Goal: Verbalizes/displays adequate comfort level or baseline comfort level  Description: Interventions:  - Encourage patient to monitor pain and request assistance  - Assess pain using appropriate pain scale  - Administer analgesics based on type and severity of pain and evaluate response  - Implement non-pharmacological measures as appropriate and evaluate response  - Consider cultural and social influences on pain and pain management  - Notify physician/advanced practitioner if interventions unsuccessful or patient reports new pain  Outcome: Progressing     Problem: INFECTION - ADULT  Goal: Absence or prevention of progression during hospitalization  Description: INTERVENTIONS:  - Assess and monitor for signs and symptoms of infection  - Monitor lab/diagnostic results  - Monitor all insertion sites, i.e. indwelling lines, tubes, and drains  - Monitor endotracheal if appropriate and nasal secretions for changes in amount and color  - Lead appropriate cooling/warming therapies per order  - Administer medications as ordered  - Instruct and encourage patient and family to use good hand hygiene technique  - Identify and instruct in appropriate isolation precautions for identified infection/condition  Outcome: Progressing     Problem: Knowledge Deficit  Goal: Patient/family/caregiver demonstrates understanding of disease process, treatment plan, medications, and discharge instructions  Description: Complete learning assessment and assess knowledge base.  Interventions:  - Provide teaching at level of understanding  - Provide teaching via preferred learning methods  Outcome: Progressing     Problem: CARDIOVASCULAR - ADULT  Goal: Maintains optimal cardiac output and hemodynamic stability  Description: INTERVENTIONS:  - Monitor I/O, vital signs and rhythm  - Monitor for S/S and trends of decreased cardiac output  - Administer and titrate ordered vasoactive medications to optimize hemodynamic  stability  - Assess quality of pulses, skin color and temperature  - Assess for signs of decreased coronary artery perfusion  - Instruct patient to report change in severity of symptoms  Outcome: Progressing

## 2024-02-05 NOTE — ASSESSMENT & PLAN NOTE
Likely toxic metabolic encephalopathy due to septic shock and decreased oral intake  Mentation progressively improving to baseline over the last several days   Limit/avoid sedating agents as possible  Continue neurochecks

## 2024-02-05 NOTE — PROGRESS NOTES
Progress Note - Infectious Disease   Bhavna JANUSZ Al 73 y.o. female MRN: [id number]  Unit/Bed#: [facility] 918-01 Encounter: [id number]      Impression/Plan:    1.  Sepsis.  Appears to be secondary to MSSA and Staphylococcus lugdunensis bacteremia.  The patient does have significant rash with areas of potential cutaneous breakdown that could lead to a transient bacteremia.  Consideration for the possibility of staph scalded skin syndrome.  The patient remains hemodynamically stable despite her systemic illness.  Procalcitonin level has been quite high.  Still with intermittent low-grade fever which may not be infection related but rather inflammatory related to the rheumatoid arthritis.  Repeat procalcitonin level has continued to decrease, CRP has also decreased since starting the steroids. Repeat blood cultures negative on 1/25.  -Antibiotics as below  -Check CBC with differential and BMP at least weekly to make sure no developing antibiotic associated toxicities or worsening infection  -Additional workup as below     2.  MSSA and Staphylococcus lugdunensis bacteremia.  Suspected cutaneous translocation in the setting of advanced psoriasis.  Consideration for the possibility of endovascular infection.  Consideration for the possibility of staph scalded skin syndrome.  Poor windows on transthoracic echocardiogram but no valvular vegetation reported.  Repeat blood cultures are negative on 1/25.  -Continue cefazolin through 2/22/2024 to complete 4 weeks from the first negative blood culture  -Remove PICC line after last dose of IV antibiotics  -Check CBC with differential and creatinine weekly while on the cefazolin  -ID follow up scheduled for 2/19 at 8:30 AM with Ulysses Ardon     3.  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  The patient has desquamating but the lesions appear to be drying and crusting.  As patient is improved and no longer requiring pressor support, will not add agents such as linezolid to suppress  toxin production.  -Antibiotics as above  -Dermatology follow-up  -Topical treatment as needed  -Serial exams     4.  Acute encephalopathy.  In the setting of sepsis from bacteremia.  No headache or stiff neck to suggest a primary CNS infection.  Suspect multifactorial in this patient with baseline cognitive issues.  Clinically improved.  -Monitor cognition  -Antibiotics as above for now  -Additional workup as needed     5.  Psoriasis.  Not as active as in the past.  Has been evaluated by dermatology  -Outpatient dermatology follow-up     6.  Atrial fibrillation.  With a rapid ventricular response.  On rate control and anticoagulation     7.  Acute kidney injury.  Complicating chronic kidney disease.  Stage III.  Suspect prerenal issues playing a significant role.  Consideration for the possibility of sepsis playing a role.  Renal function is improving.  -Recheck BMP  -Dose adjusted antibiotics  -Volume management     8.  Rheumatoid arthritis.  On low-dose prednisone and hydroxychloroquine which had been held.  Seems to be flaring as far as joint disease.  The right hand however is more prominent than the left hand as far as swelling and involvement.  Regardless the swelling and tenderness seems to have significantly improved since restarting the steroids and Plaquenil.  -Serial exams  -Continue restarted steroids and Plaquenil     Discussed with the primary service the plan to continue the intravenous cefazolin through 2024.  They agree with the plan    Antibiotics:  Cefazolin: 13  Total abx days: 14    24 Hour Events:  Afebrile, WBC normal this AM.     Subjective:  Patient has no fever, chills, sweats; no nausea, vomiting. Overall right hand feels improving. No other new joint pains.     Objective:  Vitals:  Temp:  [97.9 °F (36.6 °C)-98.6 °F (37 °C)] 98.1 °F (36.7 °C)  HR:  [70-80] 70  Resp:  [16-20] 16  BP: (115-154)/(65-76) 154/76  SpO2:  [95 %-100 %] 98 %  Temp (24hrs), Av.2 °F (36.8 °C), Min:97.9 °F  (36.6 °C), Max:98.6 °F (37 °C)  Current: Temperature: 98.1 °F (36.7 °C)    Physical Exam:   General Appearance:  Alert, interactive, nontoxic, no acute distress.   Throat: Oropharynx moist without lesions.    Lungs:   Clear to auscultation bilaterally; no wheezes, rhonchi or rales; respirations unlabored   Heart:  RRR; no murmur, rub or gallop   Abdomen:   Soft, non-tender, non-distended, positive bowel sounds.     Extremities: No clubbing. Swelling of right dorsum hand, no erythema   Skin: No new rashes; dry/peeling skin noted on both arms and face       Labs:   All pertinent labs and imaging studies were personally reviewed  Results from last 7 days   Lab Units 02/05/24 0454 02/04/24 0454 02/03/24  0535   WBC Thousand/uL 5.29 4.57 4.34   HEMOGLOBIN g/dL 10.2* 10.0* 9.2*   PLATELETS Thousands/uL 218 188 165     Results from last 7 days   Lab Units 02/05/24  0454 02/04/24  0454 02/03/24  0530 02/01/24  0814   SODIUM mmol/L 143 141 140 144   POTASSIUM mmol/L 3.8 4.0 3.4* 3.4*   CHLORIDE mmol/L 108 110* 109* 110*   CO2 mmol/L 26 25 27 30   BUN mg/dL 19 16 16 20   CREATININE mg/dL 1.04 1.00 0.99 1.00   EGFR ml/min/1.73sq m 52 55 55 55   CALCIUM mg/dL 8.7 8.4 7.8* 8.1*   AST U/L  --  33 35 43*   ALT U/L  --  <3* <3* 3*   ALK PHOS U/L  --  72 64 63     Results from last 7 days   Lab Units 02/01/24  0814 01/31/24  0643 01/30/24  0542   PROCALCITONIN ng/ml 0.39* 0.61* 1.03*     Results from last 7 days   Lab Units 02/01/24  0814 01/31/24  0643 01/30/24  0542   CRP mg/L 58.2* 119.7* 192.7*               Micro:        Imaging:          Rodri Hartman MD  [facility]

## 2024-02-05 NOTE — PROGRESS NOTES
"[facility]  Progress Note  Name: Bhavna Al I  MRN: [id number]  Unit/Bed#: [facility] 918-01 I Date of Admission: 1/22/2024   Date of Service: 2/5/2024 I Hospital Day: 13      Assessment & Plan:    * Septic shock (HCC)  Assessment & Plan  Septic shock now resolved - due to staphylococcal bacteremia and subsequent cellulitis   Transiently required vasopressor support in the ICU and stress dose steroids, now stabilized and transferred to the medical floor  Continue on IV Ancef per ID recommendations through 2/22  Monitor vitals and maintain hemodynamics  Repeat blood cultures from 1/25 are negative - initial cultures grew multi-strain staphylococcal bacteremia - echocardiogram negative for evidence of endocarditis - see plan for staphylococcal infection below  Procalcitonin trend: 10.15 peak -> 1.03 -> 0.61     Localized swelling on right hand  Assessment & Plan  By reviewing the records previously, patient had a localized swelling of the left upper extremities.  Now with localized swelling of the right hand.  Doppler negative.   XR of hand on 1/28 noting: \"joint space widening and possible erosions involving the first interphalangeal joint. There is also suggestion of soft tissue swelling at the first phalanx. Cannot exclude septic arthropathy in the appropriate clinical setting \"  Previous provider discussed with rheumatology -> recommended to continue with outpatient regimen and monitor  Personally discussed with ID -> if clinically worsens, should pursue and surgery consultation, especially if concern/suspicion increases regarding septic arthropathy  Swelling/tenderness continues to improve over the last several days with increased range of motion and decreased discomfort    Staphylococcal scalded skin syndrome  Assessment & Plan  Superimposed on possible psoriasis  Continue IV Ancef per infectious disease through 2/22   Initial blood cultures from 1/23 grew both MSSA, " Staphylococcus lugdunensis, and Staphylococcus capitis - repeat blood cultures negative  Monitor skin desquamation - appreciate prior dermatology input    Hematuria  Assessment & Plan  Hematuria currently resolved s/p irrigation  Appreciate urology input  Resumed anticoagulation (Eliquis)     Encephalopathy  Assessment & Plan  Likely toxic metabolic encephalopathy due to septic shock and decreased oral intake  Mentation progressively improving to baseline over the last several days   Limit/avoid sedating agents as possible  Continue neurochecks    Hypernatremia  Assessment & Plan  Likely secondary to decreased oral intake secondary to altered mentation and dysphagia  Oral diet modified to a puréed consistency with thin liquids -> encourage increased oral intake as recently possible in the setting of encephalopathy  Sodium normalized after a trial of a D5-1/2 NS infusion -> now discontinued due to mild fluid overload in the setting of CHF    Acute renal failure superimposed on stage 3 chronic kidney disease (HCC)  Assessment & Plan  Baseline creatinine of approximately 1.5-1.6 prior peak of 4.0 -> progressively improved currently at 1.04  Monitor renal function and urine output - limit/avoid nephrotoxins and hypotension as possible  Diuretics (Lasix) resumed now    Dermatitis associated with incontinence  Assessment & Plan  Skin moisturization with Eucerin  In order to avoid further skin infection possibly portal of entry being sites of skin breakdown, Corey catheter in place  Voiding trial once the skin lesions have eventually desquamated    Chronic diastolic heart failure (HCC)  Assessment & Plan  Diuretics (Lasix) now resumed due to mild fluid overload from the last several days of IV fluids  Continue beta-blockade with Lopressor  Fluid restriction enforced  CXR from the morning of 2/4 (on personal review) with some pulmonary vascular congestion more prominent at the bases    Pustular psoriasis  Assessment &  Plan  Known history of biopsy-proven pustular psoriasis  Appreciate dermatology input -> continue current Solu-Cortef regimen  Patient planning by dermatology for biologic treatment    Paroxysmal atrial fibrillation (HCC)  Assessment & Plan  Rate controlled on Lopressor  Anticoagulation (Eliquis) now resumed    Essential hypertension  Assessment & Plan  Continue Lopressor    Rheumatoid arthritis (HCC)  Assessment & Plan  Continue Prednisone/Plaquenil  Outpatient rheumatology follow-up  Condition likely contributory to hand swelling    Morbid obesity (HCC)  Assessment & Plan  BMI of 44.13 currently  Lifestyle/diet modifications      DVT Prophylaxis:  Eliquis    AM-PAC Basic Mobility:  Basic Mobility Inpatient Raw Score: 6  JH-HLM Achieved: 2: Bed activities/Dependent transfer  -HLM Goal: 2: Bed activities/Dependent transfer    Patient Centered Rounds:  I have performed bedside rounds and discussed plan of care with nursing today.  Discussions with Specialists or Other Care Team Provider:  see above assessments if applicable    Education and Discussions with Family / Patient: Patient at bedside - discussed in depth with daughterRhoda, over the phone yesterday about overall treatment plan    Time Spent for Care:  35 minutes. More than 50% of total time spent on counseling and coordination of care, on one or more of the following: performing physical exam; counseling and coordination of care, obtaining or reviewing history, documenting in the medical record, reviewing/ordering tests/medications/procedures, and communicating with other healthcare professionals.    Current Length of Stay: 13 day(s)  Current Patient Status: Inpatient   Certification Statement:  Patient will continue to require additional hospital stay due to assessments as noted above.    Code Status: Level 1 - Full Code        Subjective:     Encountered earlier today.  Resting fairly comfortably without complaints of worsening pain.  Overall, she  remains in relatively pleasant and hopeful spirits.        Objective:     Vitals:   Temp (24hrs), Av °F (36.7 °C), Min:97.9 °F (36.6 °C), Max:98.1 °F (36.7 °C)    Temp:  [97.9 °F (36.6 °C)-98.1 °F (36.7 °C)] 98.1 °F (36.7 °C)  HR:  [70-80] 75  Resp:  [16-20] 16  BP: (131-154)/(61-76) 131/61  SpO2:  [95 %-98 %] 98 %  Body mass index is 44.13 kg/m².     Input and Output Summary (last 24 hours):       Intake/Output Summary (Last 24 hours) at 2024 1742  Last data filed at 2024 0930  Gross per 24 hour   Intake 670 ml   Output 875 ml   Net -205 ml       Physical Exam:     GENERAL Waxing/waning but improved weakness and fatigue   HEAD   Normocephalic - atraumatic   EYES Nonicteric   MOUTH   Mucosa moist   NECK   Supple - full range of motion   CARDIAC   Regular rate/rhythm - S1/S2 positive   PULMONARY Mildly diminished at the bases due to decreased respiratory effort from body habitus - nonlabored respirations at rest   ABDOMEN   Soft - nontender/nondistended - active bowel sounds   MUSCULOSKELETAL   Motor strength/range of motion deconditioned with improved bilateral hand swelling (R > L)   NEUROLOGIC   Alert/oriented at baseline   SKIN   Chronic wrinkles/blemishes -diffuse pustular psoriasis with superimposed patches of desquamation of skin   PSYCHIATRIC   Mood/affect stable         Additional Data:     Labs & Recent Cultures:    Results from last 7 days   Lab Units 24  0454   WBC Thousand/uL 5.29   HEMOGLOBIN g/dL 10.2*   HEMATOCRIT % 34.2*   PLATELETS Thousands/uL 218   NEUTROS PCT % 83*   LYMPHS PCT % 9*   MONOS PCT % 6   EOS PCT % 1     Results from last 7 days   Lab Units 244 24  0454   POTASSIUM mmol/L 3.8 4.0   CHLORIDE mmol/L 108 110*   CO2 mmol/L 26 25   BUN mg/dL 19 16   CREATININE mg/dL 1.04 1.00   CALCIUM mg/dL 8.7 8.4   ALK PHOS U/L  --  72   ALT U/L  --  <3*   AST U/L  --  33         Results from last 7 days   Lab Units 24  1636 24  1107 24  0704  02/04/24  2100 02/04/24  1724 02/04/24  0727 02/04/24  0100 02/03/24  1743 02/03/24  1118 02/03/24  0008 02/02/24  1159 02/02/24  0003   POC GLUCOSE mg/dl 90 127 75 111 100 78 98 100 109 132 106 105         Results from last 7 days   Lab Units 02/01/24  0814 01/31/24  0643 01/30/24  0542   PROCALCITONIN ng/ml 0.39* 0.61* 1.03*                 Lines/Drains:  Invasive Devices       Peripherally Inserted Central Catheter Line  Duration             PICC Line 01/29/24 7 days              Drain  Duration             Urethral Catheter Straight-tip 18 Fr. 7 days                      Last 24 Hours Medication List:   Current Facility-Administered Medications   Medication Dose Route Frequency Provider Last Rate    acetaminophen  650 mg Oral Q4H PRN Valentine Brewer MD      albuterol  2 puff Inhalation Q6H PRN Pepe Do MD      apixaban  5 mg Oral BID Ann Moe MD      cefazolin  2,000 mg Intravenous Q8H AZIZA Wolf 2,000 mg (02/05/24 1502)    chlorhexidine  15 mL Mouth/Throat Q12H FAMILIA AZIZA Wolf      cholecalciferol  1,000 Units Oral Daily Pepe Do MD      docusate sodium  100 mg Oral BID PRN Pepe Do MD      furosemide  40 mg Oral Daily Ann Moe MD      hydrocortisone sodium succinate  25 mg Intravenous Q12H [facility] Kendrick, DO      hydroxychloroquine  200 mg Oral BID Valentine Brewer MD      magnesium sulfate  2 g Intravenous Once Ann Moe MD 2 g (02/05/24 1732)    metoprolol  5 mg Intravenous Q6H PRN AZIZA Wolf      metoprolol tartrate  12.5 mg Oral Q12H [facility] Minor DO      ondansetron  4 mg Intravenous Q6H PRN Pepe Do MD      pantoprazole  40 mg Oral Early Morning AZIZA Wolf      white petrolatum-mineral oil   Topical TID MD ANN Turner MD   Hospitalist - [facility] Internal Medicine        ** Please Note: This note is constructed using a voice recognition dictation system.   An occasional wrong word/phrase or “sound-a-like” substitution may have been picked up by dictation device due to the inherent limitations of voice recognition software.  Read the chart carefully and recognize, using reasonable context, where substitutions may have occurred.**

## 2024-02-05 NOTE — ASSESSMENT & PLAN NOTE
"By reviewing the records previously, patient had a localized swelling of the left upper extremities.  Now with localized swelling of the right hand.  Doppler negative.   XR of hand on 1/28 noting: \"joint space widening and possible erosions involving the first interphalangeal joint. There is also suggestion of soft tissue swelling at the first phalanx. Cannot exclude septic arthropathy in the appropriate clinical setting \"  Previous provider discussed with rheumatology -> recommended to continue with outpatient regimen and monitor  Personally discussed with ID -> if clinically worsens, should pursue and surgery consultation, especially if concern/suspicion increases regarding septic arthropathy  Swelling/tenderness continues to improve over the last several days with increased range of motion and decreased discomfort  "

## 2024-02-05 NOTE — ASSESSMENT & PLAN NOTE
Baseline creatinine of approximately 1.5-1.6 prior peak of 4.0 -> progressively improved currently at 1.04  Monitor renal function and urine output - limit/avoid nephrotoxins and hypotension as possible  Diuretics (Lasix) resumed now

## 2024-02-05 NOTE — CASE MANAGEMENT
Case Management Discharge Planning Note    Patient name Bhavna Al  Location [facility] 918/[facility] 918-01 MRN [id number]  : 1950 Date 2024       Current Admission Date: 2024  Current Admission Diagnosis:Septic shock (HCC)   Patient Active Problem List    Diagnosis Date Noted    Localized swelling on right hand 2024    Hematuria 2024    Encephalopathy 2024    Hypernatremia 2024    Localized swelling on left hand 2023    Chronic kidney disease 2023    Febrile illness 2023    Dermatitis associated with incontinence 2023    Right shoulder pain 12/15/2022    Abnormal urinalysis 2022    Ambulatory dysfunction 2022    Hyperuricemia 2022    Chronic diastolic heart failure (HCC) 2022    Acute bronchitis 2022    Assistance needed with transportation 09/15/2022    Abnormal CT of the chest 2022    Gram-positive cocci bacteremia 2022    Pustular psoriasis 2022    Elevated troponin 2022    COVID-19 2022    Acute renal failure superimposed on stage 3 chronic kidney disease (HCC) 01/10/2022    Paroxysmal atrial fibrillation ([facility]) 01/10/2022    Septic shock ([facility]) 01/10/2022    Hyperparathyroidism ([facility]) 2021    Murmur, cardiac 2021    BPPV (benign paroxysmal positional vertigo) 2018    Seasonal allergic rhinitis due to pollen 2018    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    Osteoporosis 2016    SIRS (systemic inflammatory response syndrome) (HCC) 2016    Rheumatoid arthritis ([facility]) 2016    GERD (gastroesophageal reflux disease) 2016    Sarcoid 2016    Morbid obesity ([facility]) 2016    Vitamin D deficiency 2015    Essential hypertension 2014    Lumbar radiculopathy 2014      LOS (days): 13  Geometric Mean LOS (GMLOS) (days): 5.1  Days to GMLOS:-8.3     OBJECTIVE:  Risk of Unplanned Readmission Score: 19.88         Current admission  status: Inpatient   Preferred Pharmacy:   [id number] - [place], [place] - [address]  [address]  [place] [address]  Phone: [phone] Fax: [phone]    [facility] - [place], [place] - [address]  [address] [address]  Phone: [phone] Fax: [phone]    Primary Care Provider: Nya Taveras DO    Primary Insurance: [facility] REP  Secondary Insurance: [facility]    DISCHARGE DETAILS:          Additional Comments: Per provider in Provider/CM rounds, pt possible to discharge in 48 hrs. CM notified Saw via [facility] as pt is L[facility] resident. PT/OT ordered as Saw is requesting auth once pt is medically clear. CM to continue to follow.

## 2024-02-06 PROBLEM — E87.0 HYPERNATREMIA: Status: RESOLVED | Noted: 2024-01-26 | Resolved: 2024-02-06

## 2024-02-06 LAB
ANION GAP SERPL CALCULATED.3IONS-SCNC: 7 MMOL/L
BASOPHILS # BLD AUTO: 0.01 THOUSANDS/ÂΜL (ref 0–0.1)
BASOPHILS NFR BLD AUTO: 0 % (ref 0–1)
BUN SERPL-MCNC: 19 MG/DL (ref 5–25)
CALCIUM SERPL-MCNC: 8.8 MG/DL (ref 8.4–10.2)
CHLORIDE SERPL-SCNC: 107 MMOL/L (ref 96–108)
CO2 SERPL-SCNC: 29 MMOL/L (ref 21–32)
CREAT SERPL-MCNC: 0.99 MG/DL (ref 0.6–1.3)
EOSINOPHIL # BLD AUTO: 0.03 THOUSAND/ÂΜL (ref 0–0.61)
EOSINOPHIL NFR BLD AUTO: 1 % (ref 0–6)
ERYTHROCYTE [DISTWIDTH] IN BLOOD BY AUTOMATED COUNT: 15.6 % (ref 11.6–15.1)
GFR SERPL CREATININE-BSD FRML MDRD: 55 ML/MIN/1.73SQ M
GLUCOSE SERPL-MCNC: 115 MG/DL (ref 65–140)
GLUCOSE SERPL-MCNC: 116 MG/DL (ref 65–140)
GLUCOSE SERPL-MCNC: 124 MG/DL (ref 65–140)
GLUCOSE SERPL-MCNC: 89 MG/DL (ref 65–140)
GLUCOSE SERPL-MCNC: 98 MG/DL (ref 65–140)
HCT VFR BLD AUTO: 32.5 % (ref 34.8–46.1)
HGB BLD-MCNC: 9.9 G/DL (ref 11.5–15.4)
IMM GRANULOCYTES # BLD AUTO: 0.05 THOUSAND/UL (ref 0–0.2)
IMM GRANULOCYTES NFR BLD AUTO: 1 % (ref 0–2)
LYMPHOCYTES # BLD AUTO: 0.43 THOUSANDS/ÂΜL (ref 0.6–4.47)
LYMPHOCYTES NFR BLD AUTO: 8 % (ref 14–44)
MAGNESIUM SERPL-MCNC: 1.9 MG/DL (ref 1.9–2.7)
MCH RBC QN AUTO: 27.3 PG (ref 26.8–34.3)
MCHC RBC AUTO-ENTMCNC: 30.5 G/DL (ref 31.4–37.4)
MCV RBC AUTO: 90 FL (ref 82–98)
MONOCYTES # BLD AUTO: 0.27 THOUSAND/ÂΜL (ref 0.17–1.22)
MONOCYTES NFR BLD AUTO: 5 % (ref 4–12)
NEUTROPHILS # BLD AUTO: 4.68 THOUSANDS/ÂΜL (ref 1.85–7.62)
NEUTS SEG NFR BLD AUTO: 85 % (ref 43–75)
NRBC BLD AUTO-RTO: 0 /100 WBCS
PLATELET # BLD AUTO: 214 THOUSANDS/UL (ref 149–390)
PMV BLD AUTO: 13.1 FL (ref 8.9–12.7)
POTASSIUM SERPL-SCNC: 4 MMOL/L (ref 3.5–5.3)
RBC # BLD AUTO: 3.62 MILLION/UL (ref 3.81–5.12)
SODIUM SERPL-SCNC: 143 MMOL/L (ref 135–147)
WBC # BLD AUTO: 5.47 THOUSAND/UL (ref 4.31–10.16)

## 2024-02-06 PROCEDURE — 97166 OT EVAL MOD COMPLEX 45 MIN: CPT

## 2024-02-06 PROCEDURE — 85025 COMPLETE CBC W/AUTO DIFF WBC: CPT | Performed by: STUDENT IN AN ORGANIZED HEALTH CARE EDUCATION/TRAINING PROGRAM

## 2024-02-06 PROCEDURE — 80048 BASIC METABOLIC PNL TOTAL CA: CPT | Performed by: INTERNAL MEDICINE

## 2024-02-06 PROCEDURE — 99232 SBSQ HOSP IP/OBS MODERATE 35: CPT | Performed by: INTERNAL MEDICINE

## 2024-02-06 PROCEDURE — 83735 ASSAY OF MAGNESIUM: CPT | Performed by: INTERNAL MEDICINE

## 2024-02-06 PROCEDURE — 97163 PT EVAL HIGH COMPLEX 45 MIN: CPT

## 2024-02-06 PROCEDURE — 82948 REAGENT STRIP/BLOOD GLUCOSE: CPT

## 2024-02-06 RX ORDER — PREDNISONE 5 MG/1
5 TABLET ORAL 2 TIMES DAILY WITH MEALS
Status: DISCONTINUED | OUTPATIENT
Start: 2024-02-06 | End: 2024-02-10 | Stop reason: HOSPADM

## 2024-02-06 RX ORDER — FUROSEMIDE 10 MG/ML
20 INJECTION INTRAMUSCULAR; INTRAVENOUS ONCE
Status: COMPLETED | OUTPATIENT
Start: 2024-02-06 | End: 2024-02-06

## 2024-02-06 RX ADMIN — Medication 1000 UNITS: at 08:41

## 2024-02-06 RX ADMIN — Medication: at 17:11

## 2024-02-06 RX ADMIN — HYDROXYCHLOROQUINE SULFATE 200 MG: 200 TABLET, FILM COATED ORAL at 08:44

## 2024-02-06 RX ADMIN — APIXABAN 5 MG: 5 TABLET, FILM COATED ORAL at 17:10

## 2024-02-06 RX ADMIN — FUROSEMIDE 40 MG: 40 TABLET ORAL at 08:42

## 2024-02-06 RX ADMIN — CEFAZOLIN SODIUM 2000 MG: 2 SOLUTION INTRAVENOUS at 05:14

## 2024-02-06 RX ADMIN — Medication 2 G: at 21:54

## 2024-02-06 RX ADMIN — Medication: at 21:54

## 2024-02-06 RX ADMIN — HYDROXYCHLOROQUINE SULFATE 200 MG: 200 TABLET, FILM COATED ORAL at 17:11

## 2024-02-06 RX ADMIN — Medication 2 G: at 14:22

## 2024-02-06 RX ADMIN — CHLORHEXIDINE GLUCONATE 15 ML: 1.2 SOLUTION ORAL at 08:42

## 2024-02-06 RX ADMIN — HYDROCORTISONE SODIUM SUCCINATE 25 MG: 100 INJECTION, POWDER, FOR SOLUTION INTRAMUSCULAR; INTRAVENOUS at 08:42

## 2024-02-06 RX ADMIN — CEFAZOLIN SODIUM 2000 MG: 2 SOLUTION INTRAVENOUS at 14:23

## 2024-02-06 RX ADMIN — FUROSEMIDE 20 MG: 10 INJECTION, SOLUTION INTRAMUSCULAR; INTRAVENOUS at 17:10

## 2024-02-06 RX ADMIN — CHLORHEXIDINE GLUCONATE 15 ML: 1.2 SOLUTION ORAL at 21:53

## 2024-02-06 RX ADMIN — APIXABAN 5 MG: 5 TABLET, FILM COATED ORAL at 08:42

## 2024-02-06 RX ADMIN — Medication 12.5 MG: at 21:53

## 2024-02-06 RX ADMIN — PREDNISONE 5 MG: 5 TABLET ORAL at 17:10

## 2024-02-06 RX ADMIN — Medication 12.5 MG: at 08:41

## 2024-02-06 RX ADMIN — PANTOPRAZOLE SODIUM 40 MG: 40 TABLET, DELAYED RELEASE ORAL at 05:14

## 2024-02-06 RX ADMIN — CEFAZOLIN SODIUM 2000 MG: 2 SOLUTION INTRAVENOUS at 21:54

## 2024-02-06 RX ADMIN — Medication: at 10:00

## 2024-02-06 RX ADMIN — Medication 2 G: at 17:11

## 2024-02-06 NOTE — ASSESSMENT & PLAN NOTE
Baseline creatinine of approximately 1.5-1.6 prior peak of 4.0 -> progressively improved currently at 0.99  Monitor renal function and urine output - limit/avoid nephrotoxins and hypotension as possible  Diuretics (Lasix) resumed now

## 2024-02-06 NOTE — WOUND OSTOMY CARE
Progress Note - Wound   Bhavna P Al 73 y.o. female MRN: [id number]  Unit/Bed#: [facility] 918-01 Encounter: [id number]        Assessment:   Patient seen today for wound care follow up assessment. Patient is dependent for all care on low air loss mattress, green foam wedges in place for repositioning. Patient is incontinent of bowel and bladder.     B/L heels intact and blanching, preventative skin care orders placed.     B/L posterior thighs and back wounds are now resolved and left open to air.       HA evolving DTI right lateral ankle- wound bed is mostly beefy red partial thickness tissue with surrounding purple, nonblanchable erythema, small sanguineous drainage.   MASD sacral slit- small linear, partial thickness skin loss with pink tissue, periwound skin is macerated, no drainage present.    No induration, fluctuance, odor, warmth/temperature differences, redness, or purulence noted to the above noted wounds and skin areas assessed. New dressings applied per orders listed below. Patient tolerated well- no s/s of non-verbal pain or discomfort observed during the encounter. Bedside nurse aware of plan of care. See flow sheets for more detailed assessment findings. Wound care will continue to follow.       Skin Care Plan:  1-Cleanse sacro-buttocks with soap and water. Apply TRIAD paste to open areas on B/L Sacro-Buttocks TID and PRN episodes of incontinence. Apply hydraguard to intact skin on sacro-buttocks.   2-Turn/reposition q2h for pressure re-distribution on skin .  3-Elevate heels to offload pressure.  4-Moisturize skin daily with skin nourishing cream  5-Ehob cushion in chair when out of bed.  6-Preventative Hydraguard to bilateral heels, elbows, hips BID and PRN.   7- Right lateral ankle - Cleanse with NSS and pat dry. Apply silicone bordered foam dressings to areas. Moo with T for Treatment and change every other day or PRN soilage/displacement.   8-P-500 Low air loss mattress ordered for patient.       Wound  01/24/24 Pressure Injury Back Left (Active)   Wound Image   02/06/24 0828   Wound Description Epithelialization 02/06/24 0828   Pressure Injury Stage 2 02/05/24 2300   Maia-wound Assessment Pink 02/06/24 0828   Wound Length (cm) 0 cm 02/06/24 0828   Wound Width (cm) 0 cm 02/06/24 0828   Wound Depth (cm) 0 cm 02/06/24 0828   Wound Surface Area (cm^2) 0 cm^2 02/06/24 0828   Wound Volume (cm^3) 0 cm^3 02/06/24 0828   Calculated Wound Volume (cm^3) 0 cm^3 02/06/24 0828   Change in Wound Size % 100 02/06/24 0828   Wound Site Closure TANYA 02/06/24 0828   Drainage Amount None 02/06/24 0828   Drainage Description Brown 02/05/24 2300   Non-staged Wound Description Not applicable 02/06/24 0828   Treatments Cleansed 02/06/24 0828   Dressing Open to air 02/06/24 0828   Wound packed? No 02/06/24 0828   Packing- # removed 0 02/06/24 0828   Packing- # inserted 0 02/06/24 0828   Dressing Changed New 02/06/24 0828   Patient Tolerance Tolerated well 02/06/24 0828   Dressing Status Clean;Dry;Intact 02/06/24 0828       Wound 01/27/24 Pressure Injury Thigh Right;Posterior (Active)   Wound Image   02/06/24 0829   Wound Description Epithelialization 02/06/24 0829   Maia-wound Assessment Pink 02/06/24 0829   Wound Length (cm) 0 cm 02/06/24 0829   Wound Width (cm) 0 cm 02/06/24 0829   Wound Depth (cm) 0 cm 02/06/24 0829   Wound Surface Area (cm^2) 0 cm^2 02/06/24 0829   Wound Volume (cm^3) 0 cm^3 02/06/24 0829   Calculated Wound Volume (cm^3) 0 cm^3 02/06/24 0829   Wound Site Closure TANYA 02/06/24 0829   Drainage Amount None 02/06/24 0829   Non-staged Wound Description Not applicable 02/06/24 0829   Treatments Cleansed 02/06/24 0829   Dressing Open to air 02/06/24 0829   Wound packed? No 02/06/24 0829   Packing- # removed 0 02/06/24 0829   Packing- # inserted 0 02/06/24 0829   Dressing Changed New 02/06/24 0829   Patient Tolerance Tolerated well 02/06/24 0829   Dressing Status Clean;Dry;Intact 02/06/24 0829       Wound 01/27/24 Pressure  Injury Ankle Lateral;Right (Active)   Wound Image   02/06/24 0825   Wound Description Beefy red;Fragile;Non-blanchable erythema 02/06/24 0825   Pressure Injury Stage DTPI 02/06/24 0825   Maia-wound Assessment Pink;Fragile 02/06/24 0825   Wound Length (cm) 1 cm 02/06/24 0825   Wound Width (cm) 0.5 cm 02/06/24 0825   Wound Depth (cm) 0.1 cm 02/06/24 0825   Wound Surface Area (cm^2) 0.5 cm^2 02/06/24 0825   Wound Volume (cm^3) 0.05 cm^3 02/06/24 0825   Calculated Wound Volume (cm^3) 0.05 cm^3 02/06/24 0825   Change in Wound Size % 77.27 02/06/24 0825   Wound Site Closure TANYA 02/06/24 0825   Drainage Amount Small 02/06/24 0825   Drainage Description Sanguineous 02/06/24 0825   Non-staged Wound Description Partial thickness 02/06/24 0825   Treatments Cleansed 02/06/24 0825   Dressing Foam, Silicon (eg. Allevyn, etc) 02/06/24 0825   Wound packed? No 02/06/24 0825   Packing- # removed 0 02/06/24 0825   Packing- # inserted 0 02/06/24 0825   Dressing Changed New 02/06/24 0825   Patient Tolerance Tolerated well 02/06/24 0825   Dressing Status Clean;Dry;Intact 02/06/24 0825       Wound 01/29/24 MASD Buttocks Right (Active)   Wound Image   02/06/24 0828   Wound Description Fragile;Pink 02/06/24 0828   Maia-wound Assessment Maceration 02/06/24 0828   Wound Length (cm) 1.5 cm 02/06/24 0828   Wound Width (cm) 0.3 cm 02/06/24 0828   Wound Depth (cm) 0.1 cm 02/06/24 0828   Wound Surface Area (cm^2) 0.45 cm^2 02/06/24 0828   Wound Volume (cm^3) 0.045 cm^3 02/06/24 0828   Calculated Wound Volume (cm^3) 0.05 cm^3 02/06/24 0828   Change in Wound Size % 95.83 02/06/24 0828   Wound Site Closure TANYA 02/06/24 0828   Drainage Amount None 02/06/24 0828   Drainage Description Brown 02/05/24 2300   Non-staged Wound Description Partial thickness 02/06/24 0828   Treatments Cleansed 02/06/24 0828   Dressing Moisture barrier 02/06/24 0828   Wound packed? No 02/06/24 0828   Packing- # removed 0 02/06/24 0828   Packing- # inserted 0 02/06/24 0828    Dressing Changed New 02/06/24 0828   Patient Tolerance Tolerated well 02/06/24 0828   Dressing Status Clean;Dry;Intact 02/06/24 0828       Wound 01/29/24 MASD Thigh Left;Posterior;Proximal (Active)   Wound Image   02/06/24 0828   Wound Description Epithelialization 02/06/24 0828   Maia-wound Assessment Pink 02/06/24 0828   Wound Length (cm) 0 cm 02/06/24 0828   Wound Width (cm) 0 cm 02/06/24 0828   Wound Depth (cm) 0 cm 02/06/24 0828   Wound Surface Area (cm^2) 0 cm^2 02/06/24 0828   Wound Volume (cm^3) 0 cm^3 02/06/24 0828   Calculated Wound Volume (cm^3) 0 cm^3 02/06/24 0828   Change in Wound Size % 100 02/06/24 0828   Wound Site Closure TANYA 02/06/24 0828   Drainage Amount None 02/06/24 0828   Drainage Description Brown 02/05/24 2300   Non-staged Wound Description Not applicable 02/06/24 0828   Treatments Cleansed 02/06/24 0828   Dressing Open to air 02/06/24 0828   Wound packed? No 02/06/24 0828   Packing- # removed 0 02/06/24 0828   Packing- # inserted 0 02/06/24 0828   Dressing Changed New 02/06/24 0828   Patient Tolerance Tolerated well 02/06/24 0828   Dressing Status Clean;Dry;Intact 02/06/24 0828                 Delisa Jacinto BSN, RN, CWOCN

## 2024-02-06 NOTE — ASSESSMENT & PLAN NOTE
Likely toxic metabolic encephalopathy due to septic shock and decreased oral intake  Mentation progressively improving to baseline over the last several days   Limit/avoid sedating agents as possible  Resolved

## 2024-02-06 NOTE — PHYSICAL THERAPY NOTE
Physical Therapy Evaluation     Patient's Name: Bhavna Al    Admitting Diagnosis  Cellulitis [L03.90]  Altered mental status [R41.82]  Atrial fibrillation with RVR ([facility]) [I48.91]  Sepsis ([facility]) [A41.9]    Problem List  Patient Active Problem List   Diagnosis    SIRS (systemic inflammatory response syndrome) ([facility])    Rheumatoid arthritis (HCC)    GERD (gastroesophageal reflux disease)    Sarcoid    Essential hypertension    Morbid obesity ([facility])    Lumbar radiculopathy    Osteoporosis    Vitamin D deficiency    Staphylococcal scalded skin syndrome    BPPV (benign paroxysmal positional vertigo)    Seasonal allergic rhinitis due to pollen    Hyperparathyroidism ([facility])    Murmur, cardiac    Acute renal failure superimposed on stage 3 chronic kidney disease ([facility])    Paroxysmal atrial fibrillation ([facility])    Septic shock ([facility])    COVID-19    Elevated troponin    Gram-positive cocci bacteremia    Pustular psoriasis    Abnormal CT of the chest    Assistance needed with transportation    Acute bronchitis    Chronic diastolic heart failure ([facility])    Hyperuricemia    Ambulatory dysfunction    Abnormal urinalysis    Right shoulder pain    Dermatitis associated with incontinence    Febrile illness    Chronic kidney disease    Localized swelling on left hand    Hypernatremia    Encephalopathy    Hematuria    Localized swelling on right hand       Past Medical History  Past Medical History:   Diagnosis Date    Abnormal thyroid function test     last assessed: 2015     Arthritis     Caries     last assessed: 2016     Continuous opioid dependence ([facility]) 2021    Edema of right lower extremity     last assessed: 2015     GERD (gastroesophageal reflux disease)     Hypertension     Medicare annual wellness visit, subsequent 2021    Positive blood culture 3/11/2022    Sarcoid        Past Surgical History  Past Surgical History:   Procedure Laterality Date     SECTION      IR PICC PLACEMENT  SINGLE LUMEN  1/29/2024    MULTIPLE TOOTH EXTRACTIONS N/A 8/27/2016    Procedure: Surgical extraction of teeth 2, 18, 19, 30, 31; incision and drainage of left subperiosteal abscess ;  Surgeon: Clara Cevallos DMD;  [facility];  Service:         02/06/24 0920   PT Last Visit   PT Visit Date 02/06/24   Note Type   Note type Evaluation   Pain Assessment   Pain Assessment Tool FLACC   Pain Location/Orientation Orientation: Right;Location: Arm   Hospital Pain Intervention(s) Repositioned;Ambulation/increased activity;Emotional support   Pain Rating: FLACC (Rest) - Face 0   Pain Rating: FLACC (Rest) - Legs 0   Pain Rating: FLACC (Rest) - Activity 1   Pain Rating: FLACC (Rest) - Cry 0   Pain Rating: FLACC (Rest) - Consolability 0   Score: FLACC (Rest) 1   Pain Rating: FLACC (Activity) - Face 1   Pain Rating: FLACC (Activity) - Legs 0   Pain Rating: FLACC (Activity) - Activity 1   Pain Rating: FLACC (Activity) - Cry 0   Pain Rating: FLACC (Activity) - Consolability 0   Score: FLACC (Activity) 2   Restrictions/Precautions   Weight Bearing Precautions Per Order No   Other Precautions Multiple lines;Fall Risk;Pain;Cognitive;Contact/isolation   Home Living   Type of Home SNF  ([facility])   Home Layout One level   Home Equipment Wheelchair-manual  (per pt, requires Ax2 for OOB into WC- able to self propel once in chair)   Prior Function   Level of [place] Needs assistance with ADLs;Needs assistance with functional mobility;Needs assistance with IADLS   Lives With Facility staff   Receives Help From Personal care attendant   IADLs Family/Friend/Other provides transportation;Family/Friend/Other provides meals;Family/Friend/Other provides medication management   General   Family/Caregiver Present No   Cognition   Arousal/Participation Alert   Orientation Level Oriented to person;Oriented to place;Oriented to time   Following Commands Follows one step commands with increased time or repetition   Comments pt  pleasant and cooperative   RLE Assessment   RLE Assessment   (functionally 2/5)   LLE Assessment   LLE Assessment   (functionally 2/5)   Bed Mobility   Rolling R 3  Moderate assistance   Additional items Assist x 2;Increased time required;Verbal cues;LE management   Rolling L 3  Moderate assistance   Additional items Assist x 2;Increased time required;Verbal cues;LE management   Additional Comments pt supine in bed upon arrival. Pt performed long sit with max Ax2 required to complete. Pt left supine in bed with all needs wihtin reach   Transfers   Sit to Stand Unable to assess   Stand to Sit Unable to assess   Additional Comments not appropriate to progress at this time   Endurance Deficit   Endurance Deficit Yes   Endurance Deficit Description generalized weakness, deconditioning, pain   Activity Tolerance   Activity Tolerance Patient limited by fatigue;Patient limited by pain   Medical Staff Made Aware TYLER Rubi; co-session completed this date 2* increased medical complexity and multiple co-morbidities  (Spoke with CM, pt needs to be seen for auth prior to d/c back to faciltity- per facility request)   Nurse Made Aware RN cleared pt to participate in therapy session   Assessment   Prognosis Guarded   Problem List Decreased strength;Decreased range of motion;Decreased endurance;Decreased mobility;Impaired balance;Impaired judgement;Decreased safety awareness;Pain   Assessment Pt is a 73 y.o. female seen for PT evaluation s/p admit to [facility] on 1/22/2024. Pt was admitted with a primary dx of: septic shock.  PT now consulted for assessment of mobility and d/c needs. Pt with  active eval/treat  orders.  Pts current comorbidities effecting treatment include: rheumatoid arthritis, HTN, obesity, staphylococcal scalded skin syndrome, CKD, a-fib, hypernatremia, encephalopathy, hematuria. Pt  has a past medical history of Abnormal thyroid function test, Arthritis, Caries, Continuous opioid dependence (HCC)  (12/2/2021), Edema of right lower extremity, GERD (gastroesophageal reflux disease), Hypertension, Medicare annual wellness visit, subsequent (12/2/2021), Positive blood culture (3/11/2022), and Sarcoid. Pts current clinical presentation is Unstable/ Unpredictable (high complexity) due to Ongoing medical management for primary dx, Increased reliance on more restrictive AD compared to baseline, Decreased activity tolerance compared to baseline, Fall risk, Increased assistance needed from caregiver at current time, Cog status, Trending lab values, Continuous pulse oximetry monitoring . Prior to admission, pt was residing at [facility] and was required A with functional mobility + ADLs. Upon evaluation, pt currently is requiring mod- max Ax2 for bed mobility. Pt presents at PT eval functioning below baseline and currently w/ overall mobility deficits 2* to: BLE weakness, decreased ROM, impaired balance, decreased endurance, pain, decreased activity tolerance compared to baseline, decreased functional mobility tolerance compared to baseline, decreased safety awareness, impaired judgement, fall risk, decreased skin integrity, decreased cognition. Pt currently at a fall risk 2* to impairments listed above.  Pt will continue to benefit from skilled acute PT interventions to address stated impairments; to maximize functional mobility; for ongoing pt/ family training; and DME needs. At conclusion of PT session pt returned BTB with phone and call bell within reach. Pt denies any further questions at this time. The patient's AM-PAC Basic Mobility Inpatient Short Form Raw Score is 6. A Raw score of less than or equal to 16 suggests the patient may benefit from discharge to post-acute rehabilitation services. Please also refer to the recommendation of the Physical Therapist for safe discharge planning. Recommend return to facility with therapy services and appropriate level of assistance upon hospital D/C.   Goals   Patient  Goals none stated   STG Expiration Date 02/20/24   Short Term Goal #1 STG 1. Pt will be able to perform bed mobility tasks with min A in order to improve overall functional mobility and assist in safe d/c. STG 2. Pt with sit EOB for at least 25 minutes at S level in order to strengthen abdominal musculature and assist in future transfers/ ambulation. STG 3. Pt will be able to perform functional transfer with mod Ax2 in order to improve overall functional mobility and assist in safe d/c. STG 4. Pt will improve sitting/standing static/dynamic balance 1/2 grade in order to improve functional mobility and assist in safe d/c. STG 5. Pt will improve LE strength by 1/2 grade in order to improve functional mobility and assist in safe d/c.   PT Treatment Day 0   Plan   Treatment/Interventions ADL retraining;LE strengthening/ROM;Therapeutic exercise;Patient/family training;Bed mobility   PT Frequency 1-2x/wk  (to assist in d/c planning)   Discharge Recommendation   Rehab Resource Intensity Level, PT II (Moderate Resource Intensity)  (return to facility with therapy services and appropriate level of assistance)   Equipment Recommended Wheelchair  (vamshi lift for OOB)   AM-PAC Basic Mobility Inpatient   Turning in Flat Bed Without Bedrails 1   Lying on Back to Sitting on Edge of Flat Bed Without Bedrails 1   Moving Bed to Chair 1   Standing Up From Chair Using Arms 1   Walk in Room 1   Climb 3-5 Stairs With Railing 1   Basic Mobility Inpatient Raw Score 6   Highest Level Of Mobility   JH-HLM Goal 2: Bed activities/Dependent transfer   JH-HLM Achieved 2: Bed activities/Dependent transfer   Modified [place] Scale   Modified [place] Scale 4           Batsheva Bailey, PT DPT

## 2024-02-06 NOTE — OCCUPATIONAL THERAPY NOTE
Occupational Therapy Evaluation     Patient Name: Bhavna Al  Today's Date: 2024  Problem List  Principal Problem:    Septic shock (HCC)  Active Problems:    Rheumatoid arthritis (HCC)    Essential hypertension    Morbid obesity (HCC)    Staphylococcal scalded skin syndrome    Acute renal failure superimposed on stage 3 chronic kidney disease (HCC)    Paroxysmal atrial fibrillation (HCC)    Pustular psoriasis    Chronic diastolic heart failure (HCC)    Dermatitis associated with incontinence    Hypernatremia    Encephalopathy    Hematuria    Localized swelling on right hand    Past Medical History  Past Medical History:   Diagnosis Date    Abnormal thyroid function test     last assessed: 2015     Arthritis     Caries     last assessed: 2016     Continuous opioid dependence (HCC) 2021    Edema of right lower extremity     last assessed: 2015     GERD (gastroesophageal reflux disease)     Hypertension     Medicare annual wellness visit, subsequent 2021    Positive blood culture 3/11/2022    Sarcoid      Past Surgical History  Past Surgical History:   Procedure Laterality Date     SECTION      IR PICC PLACEMENT SINGLE LUMEN  2024    MULTIPLE TOOTH EXTRACTIONS N/A 2016    Procedure: Surgical extraction of teeth 2, 18, 19, 30, 31; incision and drainage of left subperiosteal abscess ;  Surgeon: Clara Cevallos DMD;  [facility];  Service:         24 0921   OT Last Visit   OT Visit Date 24   Note Type   Note type Evaluation   Pain Assessment   Pain Assessment Tool FLACC   Pain Location/Orientation Orientation: Right;Location: Arm   Effect of Pain on Daily Activities limits activity tolerance, mobility, and and I w/ ADLs   Patient's Stated Pain Goal No pain   Hospital Pain Intervention(s) Repositioned;Ambulation/increased activity;Emotional support   Pain Rating: FLACC (Rest) - Face 0   Pain Rating: FLACC (Rest) - Legs 0   Pain  "Rating: FLACC (Rest) - Activity 1   Pain Rating: FLACC (Rest) - Cry 0   Pain Rating: FLACC (Rest) - Consolability 0   Score: FLACC (Rest) 1   Pain Rating: FLACC (Activity) - Face 1   Pain Rating: FLACC (Activity) - Legs 0   Pain Rating: FLACC (Activity) - Activity 1   Pain Rating: FLACC (Activity) - Cry 0   Pain Rating: FLACC (Activity) - Consolability 0   Score: FLACC (Activity) 2   Restrictions/Precautions   Weight Bearing Precautions Per Order No   Other Precautions Contact/isolation;Cognitive;Multiple lines;Fall Risk;Pain   Home Living   Type of Home SNF  ([facility])   Home Layout One level   Home Equipment Wheelchair-manual  (Pt reports requiring Ax2 to transfer in/out of WC; able to self-propel w/ her feet.)   Prior Function   Level of [place] Needs assistance with ADLs;Needs assistance with functional mobility;Needs assistance with IADLS   Lives With Facility staff   Receives Help From Personal care attendant   IADLs Family/Friend/Other provides transportation;Family/Friend/Other provides meals;Family/Friend/Other provides medication management   Vocational Retired   Lifestyle   Autonomy Pt requires A w/ ADLs, IADLs, and fxnl mobility at baseline; - driving.   Reciprocal Relationships Pt lives at [facility] w/ supportive facility staff. She also has a supportive daughter.   Service to Others Pt is retired.   Intrinsic Gratification Pt enjoys watching TV.   General   Family/Caregiver Present No   Subjective   Subjective \"Now just wait. Let me do it by myself because I know the level of pain I can handle.\" - re: rolling L/R in bed   ADL   Where Assessed Supine, bed   Eating Assistance 5  Supervision/Setup   Eating Deficit Setup  (Pt able to scoop Jello and bring spoon to her mouth using her L hand.)   Grooming Assistance 4  Minimal Assistance   UB Bathing Assistance 2  Maximal Assistance   LB Bathing Assistance 1  Total Assistance   UB Dressing Assistance 2  Maximal Assistance   LB Dressing Assistance " 1  Total Assistance   LB Dressing Deficit Don/doff R sock;Don/doff L sock   Toileting Assistance  1  Total Assistance   Bed Mobility   Rolling R 3  Moderate assistance   Additional items Assist x 2;Increased time required;Verbal cues;LE management   Rolling L 3  Moderate assistance   Additional items Assist x 2;Increased time required;Verbal cues;LE management   Additional Comments Pt also performed long sit w/ max Ax2. Pt supine in bed at end of OT evaluation w/ all needs within reach.   Transfers   Sit to Stand Unable to assess   Stand to Sit Unable to assess   Activity Tolerance   Activity Tolerance Patient limited by fatigue;Patient limited by pain   Medical Staff Made Aware PTBatsheva, due to pt's medical complexity and multiple comorbidities  (Spoke with CM, pt needs to be seen for auth prior to d/c back to faciltity- per facility request.)   Nurse Made Aware RN clearance prior to session   RUE Assessment   RUE Assessment X  (~90 degrees of shoulder flexion)   LUE Assessment   LUE Assessment X  (~90 degrees of shoulder flexion; L>R)   Hand Function   Gross Motor Coordination Impaired   Fine Motor Coordination   (R hand impaired; L hand functional)   Cognition   Arousal/Participation Responsive;Cooperative   Attention Attends with cues to redirect   Orientation Level Oriented to person;Oriented to place;Oriented to time   Following Commands Follows one step commands with increased time or repetition   Comments Pt was pleasant, cooperative, and willing to participate in OT evaluation. Pt demonstrates poor insight and can be self-limiting at times.   Assessment   Assessment Pt is a 73 y.o. female seen for OT evaluation s/p admission to [facility] on 1/22/2024. Pt diagnosed with Septic shock (HCC). Pt has a significant PMH impacting occupational performance including: Abnormal thyroid function test, Arthritis, Caries, Continuous opioid dependence (HCC), Edema of right lower extremity, GERD  (gastroesophageal reflux disease), Hypertension, Medicare annual wellness visit, subsequent, Positive blood culture, and Sarcoid. Pt with active OT evaluation and treatment orders and activity orders. PTA, pt living with supportive facility staff at [facility]. Pt requires assist w/ ADLs, IADLs, and fxnl mobility (Ax2 for transfers in/out of WC) at baseline; - driving. Pt agreeable and willing to participate in OT evaluation. During evaluation, pt required S for eating, min A for grooming, max A for UB ADLs, and total A for LB ADLs and toileting. Pt also required mod Ax2 for rolling L/R in bed and max Ax2 for long sit in bed. Pt performing ADLs at baseline level of independence. No further acute OT needs identified at this time. Recommend continued active ADL participation and mobilization with hospital staff while in the hospital to maintain pt’s endurance and strength upon D/C. From OT standpoint, recommend D/C to return to facility w/ therapy services and appropriate level of assist/support when medically cleared. D/C pt from OT caseload at this time.   Goals   Patient Goals none stated   Plan   OT Frequency Eval only   Discharge Recommendation   Rehab Resource Intensity Level, OT III (Minimum Resource Intensity)   -PAC Daily Activity Inpatient   Lower Body Dressing 1   Bathing 2   Toileting 1   Upper Body Dressing 2   Grooming 3   Eating 3   Daily Activity Raw Score 12   Daily Activity Standardized Score (Calc for Raw Score >=11) 30.6   -[facility] Applied Cognition Inpatient   Following a Speech/Presentation 3   Understanding Ordinary Conversation 4   Taking Medications 3   Remembering Where Things Are Placed or Put Away 3   Remembering List of 4-5 Errands 2   Taking Care of Complicated Tasks 2   Applied Cognition Raw Score 17   Applied Cognition Standardized Score 36.52       The patient's raw score on the AM-PAC Daily Activity Inpatient Short Form is 12. A raw score of less than 19 suggests the patient may  benefit from discharge to post-acute rehabilitation services. Please refer to the recommendation of the Occupational Therapist for safe discharge planning.    Elicia Jacobson MS, OTR/L

## 2024-02-06 NOTE — PLAN OF CARE
Problem: PHYSICAL THERAPY ADULT  Goal: Performs mobility at highest level of function for planned discharge setting.  See evaluation for individualized goals.  Description: Treatment/Interventions: ADL retraining, LE strengthening/ROM, Therapeutic exercise, Patient/family training, Bed mobility  Equipment Recommended: Wheelchair (vamshi lift for OOB)       See flowsheet documentation for full assessment, interventions and recommendations.  Note: Prognosis: Guarded  Problem List: Decreased strength, Decreased range of motion, Decreased endurance, Decreased mobility, Impaired balance, Impaired judgement, Decreased safety awareness, Pain  Assessment: Pt is a 73 y.o. female seen for PT evaluation s/p admit to [facility] on 1/22/2024. Pt was admitted with a primary dx of: septic shock.  PT now consulted for assessment of mobility and d/c needs. Pt with  active eval/treat  orders.  Pts current comorbidities effecting treatment include: rheumatoid arthritis, HTN, obesity, staphylococcal scalded skin syndrome, CKD, a-fib, hypernatremia, encephalopathy, hematuria. Pt  has a past medical history of Abnormal thyroid function test, Arthritis, Caries, Continuous opioid dependence (HCC) (12/2/2021), Edema of right lower extremity, GERD (gastroesophageal reflux disease), Hypertension, Medicare annual wellness visit, subsequent (12/2/2021), Positive blood culture (3/11/2022), and Sarcoid. Pts current clinical presentation is Unstable/ Unpredictable (high complexity) due to Ongoing medical management for primary dx, Increased reliance on more restrictive AD compared to baseline, Decreased activity tolerance compared to baseline, Fall risk, Increased assistance needed from caregiver at current time, Cog status, Trending lab values, Continuous pulse oximetry monitoring . Prior to admission, pt was residing at [facility] and was required A with functional mobility + ADLs. Upon evaluation, pt currently is requiring mod- max  Ax2 for bed mobility. Pt presents at PT eval functioning below baseline and currently w/ overall mobility deficits 2* to: BLE weakness, decreased ROM, impaired balance, decreased endurance, pain, decreased activity tolerance compared to baseline, decreased functional mobility tolerance compared to baseline, decreased safety awareness, impaired judgement, fall risk, decreased skin integrity, decreased cognition. Pt currently at a fall risk 2* to impairments listed above.  Pt will continue to benefit from skilled acute PT interventions to address stated impairments; to maximize functional mobility; for ongoing pt/ family training; and DME needs. At conclusion of PT session pt returned BTB with phone and call bell within reach. Pt denies any further questions at this time. The patient's AM-PAC Basic Mobility Inpatient Short Form Raw Score is 6. A Raw score of less than or equal to 16 suggests the patient may benefit from discharge to post-acute rehabilitation services. Please also refer to the recommendation of the Physical Therapist for safe discharge planning. Recommend return to facility with therapy services and appropriate level of assistance upon hospital D/C.        Rehab Resource Intensity Level, PT: II (Moderate Resource Intensity) (return to facility with therapy services and appropriate level of assistance)    See flowsheet documentation for full assessment.

## 2024-02-06 NOTE — PLAN OF CARE
Problem: PAIN - ADULT  Goal: Verbalizes/displays adequate comfort level or baseline comfort level  Description: Interventions:  - Encourage patient to monitor pain and request assistance  - Assess pain using appropriate pain scale  - Administer analgesics based on type and severity of pain and evaluate response  - Implement non-pharmacological measures as appropriate and evaluate response  - Consider cultural and social influences on pain and pain management  - Notify physician/advanced practitioner if interventions unsuccessful or patient reports new pain  Outcome: Progressing     Problem: INFECTION - ADULT  Goal: Absence or prevention of progression during hospitalization  Description: INTERVENTIONS:  - Assess and monitor for signs and symptoms of infection  - Monitor lab/diagnostic results  - Monitor all insertion sites, i.e. indwelling lines, tubes, and drains  - Monitor endotracheal if appropriate and nasal secretions for changes in amount and color  - Porter appropriate cooling/warming therapies per order  - Administer medications as ordered  - Instruct and encourage patient and family to use good hand hygiene technique  - Identify and instruct in appropriate isolation precautions for identified infection/condition  Outcome: Progressing

## 2024-02-06 NOTE — PROGRESS NOTES
[facility]  Progress Note  Name: Bhavna Al I  MRN: [id number]  Unit/Bed#: [facility] 918-01 I Date of Admission: 1/22/2024   Date of Service: 2/6/2024 I Hospital Day: 14    Assessment/Plan   Hematuria  Assessment & Plan  Hematuria currently resolved s/p irrigation  Appreciate urology input  Resumed anticoagulation (Eliquis)     Encephalopathy  Assessment & Plan  Likely toxic metabolic encephalopathy due to septic shock and decreased oral intake  Mentation progressively improving to baseline over the last several days   Limit/avoid sedating agents as possible  Resolved    Chronic diastolic heart failure (HCC)  Assessment & Plan  Resumed lasix. Will hive additional IV lasix x1 given some volume overload.   Continue beta-blockade with Lopressor  Fluid restriction enforced    Paroxysmal atrial fibrillation (HCC)  Assessment & Plan  Rate controlled on Lopressor  Anticoagulation (Eliquis) now resumed    Acute renal failure superimposed on stage 3 chronic kidney disease (HCC)  Assessment & Plan  Baseline creatinine of approximately 1.5-1.6 prior peak of 4.0 -> progressively improved currently at 0.99  Monitor renal function and urine output - limit/avoid nephrotoxins and hypotension as possible  Diuretics (Lasix) resumed now    Rheumatoid arthritis (HCC)  Assessment & Plan  Continue Prednisone/Plaquenil  Outpatient rheumatology follow-up  Condition likely contributory to hand swelling    * Septic shock (HCC)  Assessment & Plan  Septic shock now resolved - due to staphylococcal bacteremia and subsequent cellulitis   Transiently required vasopressor support in the ICU and stress dose steroids, now stabilized and transferred to the medical floor  Continue on IV Ancef per ID recommendations through 2/22  Monitor vitals and maintain hemodynamics  Repeat blood cultures from 1/25 are negative - initial cultures grew multi-strain staphylococcal bacteremia - echocardiogram negative for evidence of  endocarditis - see plan for staphylococcal infection below  Procalcitonin trend: 10.15 peak -> 1.03 -> 0.61     Hypernatremia-resolved as of 2/6/2024  Assessment & Plan  Likely secondary to decreased oral intake secondary to altered mentation and dysphagia  Oral diet modified to a puréed consistency with thin liquids -> encourage increased oral intake as recently possible in the setting of encephalopathy  Sodium normalized after a trial of a D5-1/2 NS infusion -> now discontinued due to mild fluid overload in the setting of CHF             VTE Pharmacologic Prophylaxis:   Moderate Risk (Score 3-4) - Pharmacological DVT Prophylaxis Ordered: apixaban (Eliquis).    Mobility:   Basic Mobility Inpatient Raw Score: 6  JH-HLM Goal: 2: Bed activities/Dependent transfer  JH-HLM Achieved: 2: Bed activities/Dependent transfer  HLM Goal achieved. Continue to encourage appropriate mobility.    Patient Centered Rounds: I performed bedside rounds with nursing staff today.   Discussions with Specialists or Other Care Team Provider: ID    Education and Discussions with Family / Patient: Attempted to update  (daughter) via phone. Left voicemail.     Total Time Spent on Date of Encounter in care of patient: 35 mins. This time was spent on one or more of the following: performing physical exam; counseling and coordination of care; obtaining or reviewing history; documenting in the medical record; reviewing/ordering tests, medications or procedures; communicating with other healthcare professionals and discussing with patient's family/caregivers.    Current Length of Stay: 14 day(s)  Current Patient Status: Inpatient   Certification Statement: The patient will continue to require additional inpatient hospital stay due to placement  Discharge Plan: Anticipate discharge in 24-48 hrs to rehab facility.    Code Status: Level 1 - Full Code    Subjective:   Feels ok. Pain is controlled.     Objective:     Vitals:   Temp (24hrs),  Av.3 °F (36.8 °C), Min:98 °F (36.7 °C), Max:98.5 °F (36.9 °C)    Temp:  [98 °F (36.7 °C)-98.5 °F (36.9 °C)] 98 °F (36.7 °C)  HR:  [75-78] 75  Resp:  [16-18] 17  BP: (116-137)/(61-97) 137/71  SpO2:  [97 %-98 %] 97 %  Body mass index is 44.17 kg/m².     Input and Output Summary (last 24 hours):     Intake/Output Summary (Last 24 hours) at 2024 1440  Last data filed at 2024 0544  Gross per 24 hour   Intake 790 ml   Output 1401 ml   Net -611 ml       Physical Exam:   Physical Exam  Vitals and nursing note reviewed.   Constitutional:       General: She is not in acute distress.     Appearance: She is well-developed.   HENT:      Head: Normocephalic and atraumatic.   Eyes:      Conjunctiva/sclera: Conjunctivae normal.   Cardiovascular:      Rate and Rhythm: Normal rate and regular rhythm.      Heart sounds: No murmur heard.  Pulmonary:      Effort: Pulmonary effort is normal. No respiratory distress.      Breath sounds: Normal breath sounds.   Abdominal:      Palpations: Abdomen is soft.      Tenderness: There is no abdominal tenderness.   Musculoskeletal:         General: No swelling.      Cervical back: Neck supple.      Right lower leg: Edema present.      Left lower leg: Edema present.   Skin:     General: Skin is warm and dry.      Capillary Refill: Capillary refill takes less than 2 seconds.   Neurological:      Mental Status: She is alert.   Psychiatric:         Mood and Affect: Mood normal.          Additional Data:     Labs:  Results from last 7 days   Lab Units 24  0519   WBC Thousand/uL 5.47   HEMOGLOBIN g/dL 9.9*   HEMATOCRIT % 32.5*   PLATELETS Thousands/uL 214   NEUTROS PCT % 85*   LYMPHS PCT % 8*   MONOS PCT % 5   EOS PCT % 1     Results from last 7 days   Lab Units 24  0519 24  0454 24  0454   SODIUM mmol/L 143   < > 141   POTASSIUM mmol/L 4.0   < > 4.0   CHLORIDE mmol/L 107   < > 110*   CO2 mmol/L 29   < > 25   BUN mg/dL 19   < > 16   CREATININE mg/dL 0.99   < > 1.00    ANION GAP mmol/L 7   < > 6   CALCIUM mg/dL 8.8   < > 8.4   ALBUMIN g/dL  --   --  2.6*   TOTAL BILIRUBIN mg/dL  --   --  0.29   ALK PHOS U/L  --   --  72   ALT U/L  --   --  <3*   AST U/L  --   --  33   GLUCOSE RANDOM mg/dL 98   < > 71    < > = values in this interval not displayed.         Results from last 7 days   Lab Units 02/06/24  1102 02/06/24  0756 02/05/24  2101 02/05/24  1636 02/05/24  1107 02/05/24  0732 02/04/24  2100 02/04/24  1724 02/04/24  0727 02/04/24  0100 02/03/24  1743 02/03/24  1118   POC GLUCOSE mg/dl 115 89 118 90 127 75 111 100 78 98 100 109         Results from last 7 days   Lab Units 02/01/24  0814 01/31/24  0643   PROCALCITONIN ng/ml 0.39* 0.61*       Lines/Drains:  Invasive Devices       Peripherally Inserted Central Catheter Line  Duration             PICC Line 01/29/24 7 days              Drain  Duration             Urethral Catheter Straight-tip 18 Fr. 8 days                  Urinary Catheter:  Goal for removal: N/A - Chronic Corey         Central Line:  Goal for removal: Will discontinue when peripheral access obtained.              Imaging: Reviewed radiology reports from this admission including: xray(s)    Recent Cultures (last 7 days):         Last 24 Hours Medication List:   Current Facility-Administered Medications   Medication Dose Route Frequency Provider Last Rate    acetaminophen  650 mg Oral Q4H PRN Valentine Brewer MD      albuterol  2 puff Inhalation Q6H PRN Pepe Do MD      apixaban  5 mg Oral BID Ann Moe MD      cefazolin  2,000 mg Intravenous Q8H AZIZA Wolf 2,000 mg (02/06/24 1423)    chlorhexidine  15 mL Mouth/Throat Q12H FAMILIA AZIZA Wolf      cholecalciferol  1,000 Units Oral Daily Pepe Do MD      Diclofenac Sodium  2 g Topical 4x Daily Toy Rodriguez MD      docusate sodium  100 mg Oral BID PRN Pepe Do MD      furosemide  40 mg Oral Daily Ann Moe MD      hydroxychloroquine  200 mg Oral BID Valentine RUCKER  MD Osman      metoprolol  5 mg Intravenous Q6H PRN AZIZA Wolf      metoprolol tartrate  12.5 mg Oral Q12H FAMILIA Minor DO      ondansetron  4 mg Intravenous Q6H PRN Pepe Do MD      pantoprazole  40 mg Oral Early Morning AZIZA Wolf      predniSONE  5 mg Oral BID With Meals Toy Rodriguez MD      white petrolatum-mineral oil   Topical TID Pepe Do MD          Today, Patient Was Seen By: Toy Rodriguez MD    **Please Note: This note may have been constructed using a voice recognition system.**

## 2024-02-06 NOTE — CASE MANAGEMENT
Case Management Discharge Planning Note    Patient name Bhavna Al  Location [facility] 918/[facility] 918-01 MRN [id number]  : 1950 Date 2024       Current Admission Date: 2024  Current Admission Diagnosis:Septic shock (HCC)   Patient Active Problem List    Diagnosis Date Noted    Localized swelling on right hand 2024    Hematuria 2024    Encephalopathy 2024    Localized swelling on left hand 2023    Chronic kidney disease 2023    Febrile illness 2023    Dermatitis associated with incontinence 2023    Right shoulder pain 12/15/2022    Abnormal urinalysis 2022    Ambulatory dysfunction 2022    Hyperuricemia 2022    Chronic diastolic heart failure (HCC) 2022    Acute bronchitis 2022    Assistance needed with transportation 09/15/2022    Abnormal CT of the chest 2022    Gram-positive cocci bacteremia 2022    Pustular psoriasis 2022    Elevated troponin 2022    COVID-19 2022    Acute renal failure superimposed on stage 3 chronic kidney disease (HCC) 01/10/2022    Paroxysmal atrial fibrillation ([facility]) 01/10/2022    Septic shock ([facility]) 01/10/2022    Hyperparathyroidism ([facility]) 2021    Murmur, cardiac 2021    BPPV (benign paroxysmal positional vertigo) 2018    Seasonal allergic rhinitis due to pollen 2018    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    Osteoporosis 2016    SIRS (systemic inflammatory response syndrome) ([facility]) 2016    Rheumatoid arthritis ([facility]) 2016    GERD (gastroesophageal reflux disease) 2016    Sarcoid 2016    Morbid obesity ([facility]) 2016    Vitamin D deficiency 2015    Essential hypertension 2014    Lumbar radiculopathy 2014      LOS (days): 14  Geometric Mean LOS (GMLOS) (days): 5.1  Days to GMLOS:-9.4     OBJECTIVE:  Risk of Unplanned Readmission Score: 20.82         Current admission status: Inpatient   Preferred  Pharmacy:   [id number] - [place], [place] - [address]  [address]  [place] [address]  Phone: [phone] Fax: [phone]    [facility] - [place], [place] - [address]  [address] [facility] [address]  Phone: [phone] Fax: [phone]    Primary Care Provider: Nya Taveras DO    Primary Insurance: [facility] REP  Secondary Insurance: [facility]    DISCHARGE DETAILS:       Additional Comments: Per provider progress note, pt to remain inpatient at this time. Pt to return to [place] as pt is LTC resident there. [place] [place] requesting insurance auth be submitted, but that pt can return with the auth pending. CM to continue to follow.

## 2024-02-07 LAB
GLUCOSE SERPL-MCNC: 117 MG/DL (ref 65–140)
GLUCOSE SERPL-MCNC: 93 MG/DL (ref 65–140)

## 2024-02-07 PROCEDURE — 82948 REAGENT STRIP/BLOOD GLUCOSE: CPT

## 2024-02-07 PROCEDURE — 92610 EVALUATE SWALLOWING FUNCTION: CPT

## 2024-02-07 PROCEDURE — 99232 SBSQ HOSP IP/OBS MODERATE 35: CPT | Performed by: INTERNAL MEDICINE

## 2024-02-07 RX ORDER — FUROSEMIDE 10 MG/ML
20 INJECTION INTRAMUSCULAR; INTRAVENOUS
Status: DISCONTINUED | OUTPATIENT
Start: 2024-02-07 | End: 2024-02-08

## 2024-02-07 RX ADMIN — PANTOPRAZOLE SODIUM 40 MG: 40 TABLET, DELAYED RELEASE ORAL at 06:37

## 2024-02-07 RX ADMIN — CHLORHEXIDINE GLUCONATE 15 ML: 1.2 SOLUTION ORAL at 20:50

## 2024-02-07 RX ADMIN — Medication 12.5 MG: at 20:52

## 2024-02-07 RX ADMIN — HYDROXYCHLOROQUINE SULFATE 200 MG: 200 TABLET, FILM COATED ORAL at 08:38

## 2024-02-07 RX ADMIN — Medication 2 G: at 11:43

## 2024-02-07 RX ADMIN — FUROSEMIDE 20 MG: 10 INJECTION, SOLUTION INTRAMUSCULAR; INTRAVENOUS at 15:40

## 2024-02-07 RX ADMIN — PREDNISONE 5 MG: 5 TABLET ORAL at 08:37

## 2024-02-07 RX ADMIN — Medication: at 20:52

## 2024-02-07 RX ADMIN — APIXABAN 5 MG: 5 TABLET, FILM COATED ORAL at 08:36

## 2024-02-07 RX ADMIN — CEFAZOLIN SODIUM 2000 MG: 2 SOLUTION INTRAVENOUS at 15:42

## 2024-02-07 RX ADMIN — FUROSEMIDE 40 MG: 40 TABLET ORAL at 08:36

## 2024-02-07 RX ADMIN — Medication 2 G: at 20:52

## 2024-02-07 RX ADMIN — Medication 2 G: at 16:59

## 2024-02-07 RX ADMIN — HYDROXYCHLOROQUINE SULFATE 200 MG: 200 TABLET, FILM COATED ORAL at 16:59

## 2024-02-07 RX ADMIN — Medication 12.5 MG: at 08:36

## 2024-02-07 RX ADMIN — APIXABAN 5 MG: 5 TABLET, FILM COATED ORAL at 16:59

## 2024-02-07 RX ADMIN — Medication: at 15:48

## 2024-02-07 RX ADMIN — PREDNISONE 5 MG: 5 TABLET ORAL at 15:42

## 2024-02-07 RX ADMIN — Medication: at 08:47

## 2024-02-07 RX ADMIN — Medication 1000 UNITS: at 08:36

## 2024-02-07 RX ADMIN — CHLORHEXIDINE GLUCONATE 15 ML: 1.2 SOLUTION ORAL at 08:36

## 2024-02-07 RX ADMIN — CEFAZOLIN SODIUM 2000 MG: 2 SOLUTION INTRAVENOUS at 06:37

## 2024-02-07 RX ADMIN — Medication 2 G: at 08:38

## 2024-02-07 RX ADMIN — CEFAZOLIN SODIUM 2000 MG: 2 SOLUTION INTRAVENOUS at 20:50

## 2024-02-07 NOTE — PROGRESS NOTES
"[facility]  Progress Note  Name: Bhavna Al I  MRN: [id number]  Unit/Bed#: [facility] 918-01 I Date of Admission: 1/22/2024   Date of Service: 2/7/2024 I Hospital Day: 15    Assessment/Plan   Localized swelling on right hand  Assessment & Plan  By reviewing the records previously, patient had a localized swelling of the left upper extremities.  Now with localized swelling of the right hand.  Doppler negative.   XR of hand on 1/28 noting: \"joint space widening and possible erosions involving the first interphalangeal joint. There is also suggestion of soft tissue swelling at the first phalanx. Cannot exclude septic arthropathy in the appropriate clinical setting \"  Previous provider discussed with rheumatology -> recommended to continue with outpatient regimen and monitor  Swelling/tenderness continues to improve over the last several days with increased range of motion and decreased discomfort    Hematuria  Assessment & Plan  Hematuria currently resolved s/p irrigation  Appreciate urology input  Resumed anticoagulation (Eliquis)     Encephalopathy  Assessment & Plan  Likely toxic metabolic encephalopathy due to septic shock and decreased oral intake  Mentation progressively improving to baseline over the last several days   Limit/avoid sedating agents as possible  Resolved    Chronic diastolic heart failure (HCC)  Assessment & Plan  Resume lasix  Continue beta-blockade with Lopressor  Fluid restriction enforced    Pustular psoriasis  Assessment & Plan  Known history of biopsy-proven pustular psoriasis  Appreciate dermatology input   Patient planning by dermatology for biologic treatment    Paroxysmal atrial fibrillation (HCC)  Assessment & Plan  Rate controlled on Lopressor  Anticoagulation (Eliquis) now resumed    Acute renal failure superimposed on stage 3 chronic kidney disease (HCC)  Assessment & Plan  Baseline creatinine of approximately 1.5-1.6 prior peak of 4.0 -> " progressively improved currently at 0.99  Monitor renal function and urine output - limit/avoid nephrotoxins and hypotension as possible  Diuretics (Lasix) resumed now    Morbid obesity (HCC)  Assessment & Plan  BMI of 44.13 currently  Lifestyle/diet modifications    Essential hypertension  Assessment & Plan  Continue Lopressor    Rheumatoid arthritis (HCC)  Assessment & Plan  Continue Prednisone/Plaquenil  Outpatient rheumatology follow-up  Condition likely contributory to hand swelling    * Septic shock ([facility])  Assessment & Plan  Septic shock now resolved - due to staphylococcal bacteremia and subsequent cellulitis   Transiently required vasopressor support in the ICU and stress dose steroids, now stabilized and transferred to the medical floor  Continue on IV Ancef per ID recommendations through 2/22  Monitor vitals and maintain hemodynamics  Repeat blood cultures from 1/25 are negative - initial cultures grew multi-strain staphylococcal bacteremia - echocardiogram negative for evidence of endocarditis - see plan for staphylococcal infection below  Resolved           VTE Pharmacologic Prophylaxis:   Moderate Risk (Score 3-4) - Pharmacological DVT Prophylaxis Ordered: apixaban (Eliquis).    Mobility:   Basic Mobility Inpatient Raw Score: 6  JH-HLM Goal: 2: Bed activities/Dependent transfer  JH-HLM Achieved: 2: Bed activities/Dependent transfer  HLM Goal achieved. Continue to encourage appropriate mobility.    Patient Centered Rounds: I performed bedside rounds with nursing staff today.   Discussions with Specialists or Other Care Team Provider: Speech therapy    Education and Discussions with Family / Patient: Attempted to update  (daughter) via phone. Left voicemail.     Total Time Spent on Date of Encounter in care of patient: 35 mins. This time was spent on one or more of the following: performing physical exam; counseling and coordination of care; obtaining or reviewing history; documenting in the  medical record; reviewing/ordering tests, medications or procedures; communicating with other healthcare professionals and discussing with patient's family/caregivers.    Current Length of Stay: 15 day(s)  Current Patient Status: Inpatient   Certification Statement: The patient will continue to require additional inpatient hospital stay due to speech evaluation, IV lasix  Discharge Plan: Anticipate discharge in 24-48 hrs to rehab facility.    Code Status: Level 1 - Full Code    Subjective:   Coughing this AM with pills. No SOB/CP    Objective:     Vitals:   Temp (24hrs), Av °F (36.7 °C), Min:97.6 °F (36.4 °C), Max:98.3 °F (36.8 °C)    Temp:  [97.6 °F (36.4 °C)-98.3 °F (36.8 °C)] 97.6 °F (36.4 °C)  HR:  [67-86] 67  Resp:  [16-20] 20  BP: (136-151)/(69-78) 151/78  SpO2:  [95 %-98 %] 98 %  Body mass index is 44.17 kg/m².     Input and Output Summary (last 24 hours):     Intake/Output Summary (Last 24 hours) at 2024 1136  Last data filed at 2024 0941  Gross per 24 hour   Intake 320 ml   Output 2325 ml   Net -2005 ml       Physical Exam:   Physical Exam  Vitals and nursing note reviewed.   Constitutional:       General: She is not in acute distress.     Appearance: She is well-developed.   HENT:      Head: Normocephalic and atraumatic.   Eyes:      Conjunctiva/sclera: Conjunctivae normal.   Cardiovascular:      Rate and Rhythm: Normal rate and regular rhythm.      Heart sounds: No murmur heard.  Pulmonary:      Effort: Pulmonary effort is normal. No respiratory distress.      Breath sounds: Normal breath sounds.   Abdominal:      Palpations: Abdomen is soft.      Tenderness: There is no abdominal tenderness.   Musculoskeletal:         General: No swelling.      Cervical back: Neck supple.      Right lower leg: Edema present.      Left lower leg: Edema present.   Skin:     General: Skin is warm and dry.   Neurological:      Mental Status: She is alert.          Additional Data:     Labs:  Results from last 7  days   Lab Units 02/06/24  0519   WBC Thousand/uL 5.47   HEMOGLOBIN g/dL 9.9*   HEMATOCRIT % 32.5*   PLATELETS Thousands/uL 214   NEUTROS PCT % 85*   LYMPHS PCT % 8*   MONOS PCT % 5   EOS PCT % 1     Results from last 7 days   Lab Units 02/06/24  0519 02/05/24  0454 02/04/24  0454   SODIUM mmol/L 143   < > 141   POTASSIUM mmol/L 4.0   < > 4.0   CHLORIDE mmol/L 107   < > 110*   CO2 mmol/L 29   < > 25   BUN mg/dL 19   < > 16   CREATININE mg/dL 0.99   < > 1.00   ANION GAP mmol/L 7   < > 6   CALCIUM mg/dL 8.8   < > 8.4   ALBUMIN g/dL  --   --  2.6*   TOTAL BILIRUBIN mg/dL  --   --  0.29   ALK PHOS U/L  --   --  72   ALT U/L  --   --  <3*   AST U/L  --   --  33   GLUCOSE RANDOM mg/dL 98   < > 71    < > = values in this interval not displayed.         Results from last 7 days   Lab Units 02/06/24  2104 02/06/24  1619 02/06/24  1102 02/06/24  0756 02/05/24  2101 02/05/24  1636 02/05/24  1107 02/05/24  0732 02/04/24  2100 02/04/24  1724 02/04/24  0727 02/04/24  0100   POC GLUCOSE mg/dl 116 124 115 89 118 90 127 75 111 100 78 98         Results from last 7 days   Lab Units 02/01/24  0814   PROCALCITONIN ng/ml 0.39*       Lines/Drains:  Invasive Devices       Peripherally Inserted Central Catheter Line  Duration             PICC Line 01/29/24 8 days              Drain  Duration             Urethral Catheter Straight-tip 18 Fr. 9 days                  Urinary Catheter:  Goal for removal: Voiding trial when ambulation improves         Central Line:  Goal for removal: Will discontinue when peripheral access obtained.              Imaging: Reviewed radiology reports from this admission including: xray(s)    Recent Cultures (last 7 days):         Last 24 Hours Medication List:   Current Facility-Administered Medications   Medication Dose Route Frequency Provider Last Rate    acetaminophen  650 mg Oral Q4H PRN Valentine Brewer MD      albuterol  2 puff Inhalation Q6H PRN Pepe oD MD      apixaban  5 mg Oral BID Ann  MD Abhi      cefazolin  2,000 mg Intravenous Q8H TYSHAWN WolfNP 2,000 mg (02/07/24 0637)    chlorhexidine  15 mL Mouth/Throat Q12H FAMILIA AZIZA Wolf      cholecalciferol  1,000 Units Oral Daily Pepe Do MD      Diclofenac Sodium  2 g Topical 4x Daily Toy Rodriguez MD      docusate sodium  100 mg Oral BID PRN Pepe Do MD      furosemide  20 mg Intravenous BID (diuretic) Toy Rodriguez MD      hydroxychloroquine  200 mg Oral BID Valentine Brewer MD      metoprolol  5 mg Intravenous Q6H PRN AZIZA Wolf      metoprolol tartrate  12.5 mg Oral Q12H [facility] Issac Minor DO      ondansetron  4 mg Intravenous Q6H PRN Pepe Do MD      pantoprazole  40 mg Oral Early Morning AZIZA Wolf      predniSONE  5 mg Oral BID With Meals Toy Rodriguez MD      white petrolatum-mineral oil   Topical TID Pepe Do MD          Today, Patient Was Seen By: Toy Rodriguez MD    **Please Note: This note may have been constructed using a voice recognition system.**

## 2024-02-07 NOTE — PLAN OF CARE
Problem: Prexisting or High Potential for Compromised Skin Integrity  Goal: Skin integrity is maintained or improved  Description: INTERVENTIONS:  - Identify patients at risk for skin breakdown  - Assess and monitor skin integrity  - Assess and monitor nutrition and hydration status  - Monitor labs   - Assess for incontinence   - Turn and reposition patient  - Assist with mobility/ambulation  - Relieve pressure over bony prominences  - Avoid friction and shearing  - Provide appropriate hygiene as needed including keeping skin clean and dry  - Evaluate need for skin moisturizer/barrier cream  - Collaborate with interdisciplinary team   - Patient/family teaching  - Consider wound care consult   Outcome: Progressing     Problem: PAIN - ADULT  Goal: Verbalizes/displays adequate comfort level or baseline comfort level  Description: Interventions:  - Encourage patient to monitor pain and request assistance  - Assess pain using appropriate pain scale  - Administer analgesics based on type and severity of pain and evaluate response  - Implement non-pharmacological measures as appropriate and evaluate response  - Consider cultural and social influences on pain and pain management  - Notify physician/advanced practitioner if interventions unsuccessful or patient reports new pain  Outcome: Progressing

## 2024-02-07 NOTE — ASSESSMENT & PLAN NOTE
Known history of biopsy-proven pustular psoriasis  Appreciate dermatology input   Patient planning by dermatology for biologic treatment

## 2024-02-07 NOTE — CASE MANAGEMENT
Case Management Discharge Planning Note    Patient name Bhavna Al  Location [facility] 918/[facility] 918-01 MRN [id number]  : 1950 Date 2024       Current Admission Date: 2024  Current Admission Diagnosis:Septic shock (HCC)   Patient Active Problem List    Diagnosis Date Noted    Localized swelling on right hand 2024    Hematuria 2024    Encephalopathy 2024    Localized swelling on left hand 2023    Chronic kidney disease 2023    Febrile illness 2023    Dermatitis associated with incontinence 2023    Right shoulder pain 12/15/2022    Abnormal urinalysis 2022    Ambulatory dysfunction 2022    Hyperuricemia 2022    Chronic diastolic heart failure (HCC) 2022    Acute bronchitis 2022    Assistance needed with transportation 09/15/2022    Abnormal CT of the chest 2022    Gram-positive cocci bacteremia 2022    Pustular psoriasis 2022    Elevated troponin 2022    COVID-19 2022    Acute renal failure superimposed on stage 3 chronic kidney disease (HCC) 01/10/2022    Paroxysmal atrial fibrillation ([facility]) 01/10/2022    Septic shock ([facility]) 01/10/2022    Hyperparathyroidism ([facility]) 2021    Murmur, cardiac 2021    BPPV (benign paroxysmal positional vertigo) 2018    Seasonal allergic rhinitis due to pollen 2018    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    Osteoporosis 2016    SIRS (systemic inflammatory response syndrome) ([facility]) 2016    Rheumatoid arthritis ([facility]) 2016    GERD (gastroesophageal reflux disease) 2016    Sarcoid 2016    Morbid obesity ([facility]) 2016    Vitamin D deficiency 2015    Essential hypertension 2014    Lumbar radiculopathy 2014      LOS (days): 15  Geometric Mean LOS (GMLOS) (days): 5.1  Days to GMLOS:-10.3     OBJECTIVE:  Risk of Unplanned Readmission Score: 18.68         Current admission status: Inpatient   Preferred  Pharmacy:   [id number] - [place], [place] - [address]  [address]  [place] [address]  Phone: [phone] Fax: [phone]    [facility] - [place], [place] - [address]  [address] [facility] [address]  Phone: [phone] Fax: [phone]    Primary Care Provider: Nya Taveras DO    Primary Insurance: [facility] REP  Secondary Insurance: [facility]    DISCHARGE DETAILS:          Additional Comments: Per provider, pt possible to discharge later today vs. tomorrow pending speech. Pt to return to [facility] as pt is LTC resident there. CM to submit for auth as requested by [place], but pt can return with pending auth. CM to follow.

## 2024-02-07 NOTE — ASSESSMENT & PLAN NOTE
"By reviewing the records previously, patient had a localized swelling of the left upper extremities.  Now with localized swelling of the right hand.  Doppler negative.   XR of hand on 1/28 noting: \"joint space widening and possible erosions involving the first interphalangeal joint. There is also suggestion of soft tissue swelling at the first phalanx. Cannot exclude septic arthropathy in the appropriate clinical setting \"  Previous provider discussed with rheumatology -> recommended to continue with outpatient regimen and monitor  Swelling/tenderness continues to improve over the last several days with increased range of motion and decreased discomfort  "

## 2024-02-07 NOTE — SPEECH THERAPY NOTE
Speech-Language Pathology Bedside Swallow Evaluation      Patient Name: Bhavna Al    Today's Date: 2024     Problem List  Principal Problem:    Septic shock (HCC)  Active Problems:    Rheumatoid arthritis (HCC)    Essential hypertension    Morbid obesity (HCC)    Staphylococcal scalded skin syndrome    Acute renal failure superimposed on stage 3 chronic kidney disease (HCC)    Paroxysmal atrial fibrillation (HCC)    Pustular psoriasis    Chronic diastolic heart failure (HCC)    Dermatitis associated with incontinence    Encephalopathy    Hematuria    Localized swelling on right hand      Past Medical History  Past Medical History:   Diagnosis Date    Abnormal thyroid function test     last assessed: 2015     Arthritis     Caries     last assessed: 2016     Continuous opioid dependence (HCC) 2021    Edema of right lower extremity     last assessed: 2015     GERD (gastroesophageal reflux disease)     Hypertension     Medicare annual wellness visit, subsequent 2021    Positive blood culture 3/11/2022    Sarcoid        Past Surgical History  Past Surgical History:   Procedure Laterality Date     SECTION      IR PICC PLACEMENT SINGLE LUMEN  2024    MULTIPLE TOOTH EXTRACTIONS N/A 2016    Procedure: Surgical extraction of teeth 2, 18, 19, 30, 31; incision and drainage of left subperiosteal abscess ;  Surgeon: Clara Cevallos DMD;  Location: [facility];  Service:        Summary   Pt presented with functional appearing oral and pharyngeal stage swallowing skills with materials administered today. Pt is assessed with puree solids (jello, soup), hard solids (crackers) and thin liquids. She is accompanied by her granddaughter at bedside and helps assist with feeding. Retrieval of materials via tsp and straw are adequate. No oral residue observed. No overt s/s of aspiration observed.     Dysphagia consult received as MD reported coughing after pills and am meal.      Risk/s for Aspiration: low     Recommended Diet: regular diet and thin liquids   Recommended Form of Meds: whole with liquid   Aspiration precautions and swallowing strategies: upright posture and small bites/sips  Other Recommendations: Continue frequent oral care    Current Medical Status  Chief Complaint:     Fever, altered mental status, lethargy  History of Present Illness:  Bhavna Al is a 73 y.o. female who has past medical history significant for morbid obesity with dermatitis currently in nursing facility for continued rehab, history of GERD, benign essential hypertension, bronchitis, chronic diastolic heart failure, dermatitis, hyperuricemia, lumbar radiculopathy, rheumatoid arthritis who comes to the emergency room sent by nursing facility due to finding of fever with altered mental status as of 5 PM yesterday.  According to daughter and granddaughter who are in the room, patient normally is very active and talking coherently.  However as of the fever last night she is more lethargic and although currently will sometimes open her eyes, she does not indulge in any conversation.  According to granddaughter last time that she was like so, she had some fluids with good response to her mental status.  Currently she does not give any history.  Laboratory studies were done which showed presence of RBCs and some WBCs but no bacteria in urine.  Her procalcitonin is normal but her lactic acid is elevated at 2.3.  That said, she was then started on cefepime.  She was then given some fluids albeit carefully first at 250 mL x 1 and followed by 500 mL x 1.    Current Precautions:  Contact     Allergies:  No known food allergies    Past medical history:  Please see H&P for details    Special Studies:  Chest XR 2/4/24-   IMPRESSION:     Findings are most consistent with pulmonary congestion, with a trace left-sided effusion and no evidence of pneumothorax     PICC line tip in superior vena cava, or possibly  entering into the azygos arch.      VBS 1/18/22-   Summary:  Images are on PACS for review.   Pt presents w/ mild oropharyngeal dysphagia venkata by mildly prolonged mastication and oral manipulation, delayed swallow initiation, and min-mild pharyngeal residue. Pt w/ slow transfer of material. All material spills to the valleculae prior to delayed hyo-laryngeal excursion. Min-mild vallecular retention noted after the swallow w/ subsequent pooling in the pyriforms. No penetration or aspiration on today's study. Brief screening of the esophagus revealed slow motility and retention.      Recommendations:  Diet: Regular   Liquids: Thin   Meds: As tolerated  Strategies: Mult. Swallows   Upright position  F/u ST tx: Yes, brief to review VBS findings and recommendations   Therapy Prognosis: Good  Prognosis considerations: Age, current medical, past medical, cognitive status   Aspiration Precautions   Reflux Precautions  Consider consult with: GI   Results reviewed with: pt, nursing  Aspiration precautions posted.  If a dedicated assessment of the esophagus is desired, consider esophagram/barium swallow or EGD.      Social/Education/Vocational Hx:  Pt lives in SNF/ECF    Swallow Information   Current Risks for Dysphagia & Aspiration: known history of dysphagia  Current Symptoms/Concerns:  none observed   Current Diet: regular diet and thin liquids   Baseline Diet: regular diet and thin liquids    Baseline Assessment   Behavior/Cognition: alert  Speech/Language Status: able to participate in conversation  Patient Positioning:  laying in bed  Pain Status/Interventions/Response to Interventions: No report of or nonverbal indications of pain.    Swallow Mechanism Exam  Formal oral mech not completed. No weakness or asymmetry observed. Patient is on room air.     Consistencies Assessed and Performance   Consistencies Administered: thin liquids, puree, and hard solids  Materials administered included thin liquids, split pea soup,  jello and a saltine cracker     Oral Stage: WFL  Mastication was adequate with the materials administered today.  Bolus formation and transfer were functional with no significant oral residue noted.  No overt s/s reduced oral control.    Pharyngeal Stage: WFL  Swallow Mechanics:  Swallowing initiation appeared prompt.  Laryngeal rise was observed and judged to be within functional limits.  No coughing, throat clearing, change in vocal quality or respiratory status noted today.     Esophageal Concerns: none reported    Summary and Recommendations (see above)    Results Reviewed with: RN     Treatment Recommended: Evaluation only.

## 2024-02-07 NOTE — PLAN OF CARE
Problem: Prexisting or High Potential for Compromised Skin Integrity  Goal: Skin integrity is maintained or improved  Description: INTERVENTIONS:  - Identify patients at risk for skin breakdown  - Assess and monitor skin integrity  - Assess and monitor nutrition and hydration status  - Monitor labs   - Assess for incontinence   - Turn and reposition patient  - Assist with mobility/ambulation  - Relieve pressure over bony prominences  - Avoid friction and shearing  - Provide appropriate hygiene as needed including keeping skin clean and dry  - Evaluate need for skin moisturizer/barrier cream  - Collaborate with interdisciplinary team   - Patient/family teaching  - Consider wound care consult   Outcome: Progressing     Problem: PAIN - ADULT  Goal: Verbalizes/displays adequate comfort level or baseline comfort level  Description: Interventions:  - Encourage patient to monitor pain and request assistance  - Assess pain using appropriate pain scale  - Administer analgesics based on type and severity of pain and evaluate response  - Implement non-pharmacological measures as appropriate and evaluate response  - Consider cultural and social influences on pain and pain management  - Notify physician/advanced practitioner if interventions unsuccessful or patient reports new pain  Outcome: Progressing     Problem: INFECTION - ADULT  Goal: Absence or prevention of progression during hospitalization  Description: INTERVENTIONS:  - Assess and monitor for signs and symptoms of infection  - Monitor lab/diagnostic results  - Monitor all insertion sites, i.e. indwelling lines, tubes, and drains  - Monitor endotracheal if appropriate and nasal secretions for changes in amount and color  - [place] appropriate cooling/warming therapies per order  - Administer medications as ordered  - Instruct and encourage patient and family to use good hand hygiene technique  - Identify and instruct in appropriate isolation precautions for  identified infection/condition  Outcome: Progressing

## 2024-02-07 NOTE — ASSESSMENT & PLAN NOTE
Septic shock now resolved - due to staphylococcal bacteremia and subsequent cellulitis   Transiently required vasopressor support in the ICU and stress dose steroids, now stabilized and transferred to the medical floor  Continue on IV Ancef per ID recommendations through 2/22  Monitor vitals and maintain hemodynamics  Repeat blood cultures from 1/25 are negative - initial cultures grew multi-strain staphylococcal bacteremia - echocardiogram negative for evidence of endocarditis - see plan for staphylococcal infection below  Resolved

## 2024-02-08 LAB
ANION GAP SERPL CALCULATED.3IONS-SCNC: 5 MMOL/L
BUN SERPL-MCNC: 17 MG/DL (ref 5–25)
CALCIUM SERPL-MCNC: 8.3 MG/DL (ref 8.4–10.2)
CHLORIDE SERPL-SCNC: 103 MMOL/L (ref 96–108)
CO2 SERPL-SCNC: 34 MMOL/L (ref 21–32)
CREAT SERPL-MCNC: 0.9 MG/DL (ref 0.6–1.3)
GFR SERPL CREATININE-BSD FRML MDRD: 62 ML/MIN/1.73SQ M
GLUCOSE SERPL-MCNC: 103 MG/DL (ref 65–140)
GLUCOSE SERPL-MCNC: 117 MG/DL (ref 65–140)
GLUCOSE SERPL-MCNC: 77 MG/DL (ref 65–140)
GLUCOSE SERPL-MCNC: 80 MG/DL (ref 65–140)
GLUCOSE SERPL-MCNC: 97 MG/DL (ref 65–140)
POTASSIUM SERPL-SCNC: 2.9 MMOL/L (ref 3.5–5.3)
POTASSIUM SERPL-SCNC: 3.5 MMOL/L (ref 3.5–5.3)
SODIUM SERPL-SCNC: 142 MMOL/L (ref 135–147)

## 2024-02-08 PROCEDURE — 80048 BASIC METABOLIC PNL TOTAL CA: CPT | Performed by: INTERNAL MEDICINE

## 2024-02-08 PROCEDURE — 84132 ASSAY OF SERUM POTASSIUM: CPT | Performed by: INTERNAL MEDICINE

## 2024-02-08 PROCEDURE — 82948 REAGENT STRIP/BLOOD GLUCOSE: CPT

## 2024-02-08 PROCEDURE — 97168 OT RE-EVAL EST PLAN CARE: CPT

## 2024-02-08 PROCEDURE — 99232 SBSQ HOSP IP/OBS MODERATE 35: CPT | Performed by: INTERNAL MEDICINE

## 2024-02-08 PROCEDURE — 97530 THERAPEUTIC ACTIVITIES: CPT

## 2024-02-08 PROCEDURE — 99233 SBSQ HOSP IP/OBS HIGH 50: CPT | Performed by: INTERNAL MEDICINE

## 2024-02-08 RX ORDER — POTASSIUM CHLORIDE 20 MEQ/1
40 TABLET, EXTENDED RELEASE ORAL 2 TIMES DAILY
Status: COMPLETED | OUTPATIENT
Start: 2024-02-08 | End: 2024-02-08

## 2024-02-08 RX ORDER — POTASSIUM CHLORIDE 14.9 MG/ML
20 INJECTION INTRAVENOUS ONCE
Status: COMPLETED | OUTPATIENT
Start: 2024-02-08 | End: 2024-02-08

## 2024-02-08 RX ORDER — MAGNESIUM SULFATE HEPTAHYDRATE 40 MG/ML
2 INJECTION, SOLUTION INTRAVENOUS ONCE
Status: COMPLETED | OUTPATIENT
Start: 2024-02-08 | End: 2024-02-08

## 2024-02-08 RX ORDER — FUROSEMIDE 10 MG/ML
40 INJECTION INTRAMUSCULAR; INTRAVENOUS
Status: DISCONTINUED | OUTPATIENT
Start: 2024-02-08 | End: 2024-02-10 | Stop reason: HOSPADM

## 2024-02-08 RX ORDER — POTASSIUM CHLORIDE 20 MEQ/1
20 TABLET, EXTENDED RELEASE ORAL ONCE
Status: COMPLETED | OUTPATIENT
Start: 2024-02-08 | End: 2024-02-08

## 2024-02-08 RX ADMIN — Medication 12.5 MG: at 07:59

## 2024-02-08 RX ADMIN — POTASSIUM CHLORIDE 20 MEQ: 14.9 INJECTION, SOLUTION INTRAVENOUS at 07:32

## 2024-02-08 RX ADMIN — Medication: at 18:04

## 2024-02-08 RX ADMIN — Medication 12.5 MG: at 20:34

## 2024-02-08 RX ADMIN — CEFAZOLIN SODIUM 2000 MG: 2 SOLUTION INTRAVENOUS at 14:41

## 2024-02-08 RX ADMIN — Medication 1000 UNITS: at 08:00

## 2024-02-08 RX ADMIN — HYDROXYCHLOROQUINE SULFATE 200 MG: 200 TABLET, FILM COATED ORAL at 08:00

## 2024-02-08 RX ADMIN — APIXABAN 5 MG: 5 TABLET, FILM COATED ORAL at 17:53

## 2024-02-08 RX ADMIN — Medication 2 G: at 17:54

## 2024-02-08 RX ADMIN — Medication 2 G: at 20:35

## 2024-02-08 RX ADMIN — Medication: at 08:11

## 2024-02-08 RX ADMIN — CHLORHEXIDINE GLUCONATE 15 ML: 1.2 SOLUTION ORAL at 08:00

## 2024-02-08 RX ADMIN — FUROSEMIDE 40 MG: 10 INJECTION, SOLUTION INTRAMUSCULAR; INTRAVENOUS at 17:53

## 2024-02-08 RX ADMIN — CHLORHEXIDINE GLUCONATE 15 ML: 1.2 SOLUTION ORAL at 20:33

## 2024-02-08 RX ADMIN — CEFAZOLIN SODIUM 2000 MG: 2 SOLUTION INTRAVENOUS at 05:06

## 2024-02-08 RX ADMIN — MAGNESIUM SULFATE HEPTAHYDRATE 2 G: 40 INJECTION, SOLUTION INTRAVENOUS at 07:32

## 2024-02-08 RX ADMIN — POTASSIUM CHLORIDE 40 MEQ: 1500 TABLET, EXTENDED RELEASE ORAL at 08:00

## 2024-02-08 RX ADMIN — APIXABAN 5 MG: 5 TABLET, FILM COATED ORAL at 07:59

## 2024-02-08 RX ADMIN — FUROSEMIDE 40 MG: 10 INJECTION, SOLUTION INTRAMUSCULAR; INTRAVENOUS at 07:39

## 2024-02-08 RX ADMIN — HYDROXYCHLOROQUINE SULFATE 200 MG: 200 TABLET, FILM COATED ORAL at 17:54

## 2024-02-08 RX ADMIN — PANTOPRAZOLE SODIUM 40 MG: 40 TABLET, DELAYED RELEASE ORAL at 05:06

## 2024-02-08 RX ADMIN — CEFAZOLIN SODIUM 2000 MG: 2 SOLUTION INTRAVENOUS at 20:34

## 2024-02-08 RX ADMIN — PREDNISONE 5 MG: 5 TABLET ORAL at 07:39

## 2024-02-08 RX ADMIN — Medication 2 G: at 08:00

## 2024-02-08 RX ADMIN — PREDNISONE 5 MG: 5 TABLET ORAL at 17:53

## 2024-02-08 RX ADMIN — Medication: at 20:34

## 2024-02-08 RX ADMIN — POTASSIUM CHLORIDE 20 MEQ: 1500 TABLET, EXTENDED RELEASE ORAL at 20:34

## 2024-02-08 RX ADMIN — POTASSIUM CHLORIDE 40 MEQ: 1500 TABLET, EXTENDED RELEASE ORAL at 17:53

## 2024-02-08 NOTE — OCCUPATIONAL THERAPY NOTE
Occupational Therapy Re-Evaluation     Patient Name: Bhavna Al  Today's Date: 2024  Problem List  Principal Problem:    Septic shock (HCC)  Active Problems:    Rheumatoid arthritis (HCC)    Essential hypertension    Morbid obesity (HCC)    Staphylococcal scalded skin syndrome    Acute renal failure superimposed on stage 3 chronic kidney disease (HCC)    Paroxysmal atrial fibrillation (HCC)    Pustular psoriasis    Chronic diastolic heart failure (HCC)    Dermatitis associated with incontinence    Encephalopathy    Hematuria    Localized swelling on right hand    Past Medical History  Past Medical History:   Diagnosis Date    Abnormal thyroid function test     last assessed: 2015     Arthritis     Caries     last assessed: 2016     Continuous opioid dependence (HCC) 2021    Edema of right lower extremity     last assessed: 2015     GERD (gastroesophageal reflux disease)     Hypertension     Medicare annual wellness visit, subsequent 2021    Positive blood culture 3/11/2022    Sarcoid      Past Surgical History  Past Surgical History:   Procedure Laterality Date     SECTION      IR PICC PLACEMENT SINGLE LUMEN  2024    MULTIPLE TOOTH EXTRACTIONS N/A 2016    Procedure: Surgical extraction of teeth 2, 18, 19, 30, 31; incision and drainage of left subperiosteal abscess ;  Surgeon: Clara Cevallos DMD;  Location: [facility];  Service:            24 1034   OT Last Visit   OT Visit Date 24   Note Type   Note type Re-Evaluation   Pain Assessment   Pain Assessment Tool 0-10   Pain Score 10 - Worst Possible Pain   Pain Location/Orientation Orientation: Bilateral;Location: Knee   Hospital Pain Intervention(s) Repositioned;Ambulation/increased activity   Restrictions/Precautions   Weight Bearing Precautions Per Order No   Other Precautions Multiple lines;Fall Risk;Pain   Home Living   Type of Home SNF  ([facility])   Home Layout One level   Home  Equipment Wheelchair-manual  (pt reports typically performs sit pivot with Ax2 into WC and propels with her feet)   Prior Function   Level of [place] Needs assistance with ADLs;Needs assistance with IADLS   Lives With Facility staff   Receives Help From Personal care attendant   IADLs Family/Friend/Other provides transportation;Family/Friend/Other provides meals;Family/Friend/Other provides medication management   Falls in the last 6 months 0   Vocational Retired   Comments Pt reports she is able to perform UB ADLs and grooming w/o assistance, A for LB ADLs. Performs bed mobility w/o A   Lifestyle   Autonomy PTA, pt reports performing UB ADLs, grooming, and eating with just set up, A for LB ADLs and IADLs. 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'   ADL   Where Assessed Supine, bed   Eating Assistance 5  Supervision/Setup   Grooming Assistance 5  Supervision/Setup   UB Bathing Assistance 3  Moderate Assistance   LB Bathing Assistance 1  Total Assistance   UB Dressing Assistance 3  Moderate Assistance   LB Dressing Assistance 1  Total Assistance   Toileting Assistance  1  Total Assistance   Bed Mobility   Rolling R 2  Maximal assistance   Additional items Assist x 1;Bedrails;Increased time required   Rolling L 2  Maximal assistance   Additional items Assist x 1;Bedrails;Increased time required   Supine to Sit 2  Maximal assistance   Additional items Assist x 2;HOB elevated;Bedrails;Increased time required;LE management   Sit to Supine 2  Maximal assistance   Additional items Assist x 2;HOB elevated;Bedrails;Increased time required;LE management   Additional Comments Maintains EOB sitting position with MIN to MOD A, presents with L lateral lean   Transfers   Sit to Stand Unable to assess   Stand to Sit Unable to assess   Balance   Static Sitting Poor    Dynamic Sitting Poor -   Activity Tolerance   Activity Tolerance Patient limited by fatigue;Patient limited by pain   Medical Staff Made Aware PT SHARRI Herman   Nurse Made Aware RN clearance for session   RUE Assessment   RUE Assessment X  (pt noted to have hand/digits in flexed position, unable to perform extension/unable to perform PROM extension 2* pain. Proximal ROM WFL. Pt reports this limited hand function is NOT her baseline)   LUE Assessment   LUE Assessment WFL  (AROM WFL, strength ~ 3+/5)   Hand Function   Gross Motor Coordination Impaired   Fine Motor Coordination Impaired   Hand Function Comments impaired FMC/strength in RUE   Cognition   Overall Cognitive Status WFL   Arousal/Participation Responsive;Cooperative   Attention Attends with cues to redirect   Orientation Level Oriented X4   Memory Within functional limits   Following Commands Follows one step commands with increased time or repetition   Comments Pt pleasant and cooperative t/o session, limited at times 2* fear of falling and fear of pain   Assessment   Limitation Decreased ADL status;Decreased UE ROM;Decreased Safe judgement during ADL;Decreased UE strength;Decreased endurance;Decreased fine motor control;Decreased self-care trans;Decreased high-level ADLs   Prognosis Fair   Assessment Pt seen for OT re-evaluation. PTA, pt requires for ADLs/IADLs (does report being able to perform eating, grooming, and UB ADLs w/o A). Upon re-evaluation, pt currently requires MAX A x 1 to roll, MAX A x 2 sup <> sit. Exhibits decreased strength to B/L UE's limiting performance in ADLs/transfers. Pt currently requires S eating and grooming, MOD A UB ADLs, TOTAL A LB ADLs, and TOTAL A toileting. Pt is limited at this time 2*: pain, endurance, activity tolerance, functional mobility, balance, decreased I w/ ADLS/IADLS, strength, ROM, and impaired fine motor skills.The following Occupational Performance Areas to address include: eating, grooming, bathing/shower,  toilet hygiene, dressing, functional mobility, community mobility, and clothing management. Pt to benefit from immediate acute skilled OT to address above deficits, improve overall functional independence, maximize fnxl mobility and reduce caregiver burden. From OT standpoint, recommendation at time of d/c would be return to facility with rehab services.  Pt was left after session with all current needs met. The patient's raw score on the AM-PAC Daily Activity Inpatient Short Form is 12. A raw score of less than 19 suggests the patient may benefit from discharge to post-acute rehabilitation services. Please refer to the recommendation of the Occupational Therapist for safe discharge planning.   Goals   LT Time Frame 10-14   Plan   Treatment Interventions ADL retraining;Functional transfer training;UE strengthening/ROM;Endurance training;Patient/family training;Equipment evaluation/education;Cognitive reorientation;Fine motor coordination activities;Compensatory technique education;Continued evaluation;Energy conservation;Activityengagement;Neuromuscular reeducation   Goal Expiration Date 02/22/24   OT Frequency 1-2x/wk   Discharge Recommendation   Rehab Resource Intensity Level, OT III (Minimum Resource Intensity)  (return to facility with rehab services)   AM-PAC Daily Activity Inpatient   Lower Body Dressing 1   Bathing 2   Toileting 1   Upper Body Dressing 2   Grooming 3   Eating 3   Daily Activity Raw Score 12   Daily Activity Standardized Score (Calc for Raw Score >=11) 30.6   AM-PAC Applied Cognition Inpatient   Following a Speech/Presentation 3   Understanding Ordinary Conversation 4   Taking Medications 4   Remembering Where Things Are Placed or Put Away 3   Remembering List of 4-5 Errands 3   Taking Care of Complicated Tasks 2   Applied Cognition Raw Score 19   Applied Cognition Standardized Score 39.77     GOALS    - Pt will complete UB dressing/self care/bathing w/ S using adaptive device and DME as  needed    - Pt will complete LB dressing/self care/bathing w/ MOD A using adaptive device and DME as needed    - Pt will complete toileting w/ MOD A w/ G hygiene/thoroughness using DME as needed    - Pt will improve functional transfers to MOD A x 2 on/off all surfaces using DME as needed w/ G balance/safety     - Pt will improve WC mobility during ADL/IADL/leisure tasks to S using DME as needed w/ G balance/safety     - Pt will demonstrate G carryover of pt/caregiver education and training as appropriate.    - Pt will demonstrate 100% carryover of energy conservation techniques t/o functional I/ADL/leisure tasks w/o cues s/p skilled education    - Pt will engage in ongoing cognitive assessment w/ G participation to assist w/ safe d/c planning/recommendations    - Pt will increase static and dynamic sitting balance to F+ using AD/DME as needed to increase safety and I during fnxl transfers and ADLs    - Pt will maintain upright sitting position for at least 20 min during dynamic fnxl activity with F+ balance and endurance to improve ADL performance and independence, as well as reduce risk of falls     - Pt will participate in fine/gross motor coordination/strenthening/dexterity exercises in order to increase participation in functional activities.       Milagro Parker MS, OTR/L

## 2024-02-08 NOTE — PLAN OF CARE
Problem: PHYSICAL THERAPY ADULT  Goal: Performs mobility at highest level of function for planned discharge setting.  See evaluation for individualized goals.  Description: Treatment/Interventions: ADL retraining, LE strengthening/ROM, Therapeutic exercise, Patient/family training, Bed mobility  Equipment Recommended: Wheelchair (vamshi lift for OOB)       See flowsheet documentation for full assessment, interventions and recommendations.  Outcome: Progressing  Note: Prognosis: Guarded  Problem List: Decreased strength, Decreased range of motion, Decreased endurance, Decreased mobility, Impaired balance, Impaired judgement, Decreased safety awareness, Pain  Assessment: Pt seen for PT treatment session this date. Therapy session focused on bed mobility, sitting tolerance/ balance and endurance training in order to improve overall mobility and independence. Pt requires max A x1-2 for all mobility performed this date. Was able to get to EOB today with min- mod A required to maintain upright balance. Upright tolerance limited by fear of falling as well as pt requesting to use bed pan. Increased time + rolling required for hygiene tasks. Pt making slow but steady progress toward goals. Pt was left supine in bed at the end of PT session with all needs in reach. Pt would benefit from continued PT services while in hospital to address remaining limitations. PT to continue to follow pt and recommends return to facility with therapy services and appropriate level of assistance. The patient's AM-PAC Basic Mobility Inpatient Short Form Raw Score is 6. A Raw score of less than or equal to 16 suggests the patient may benefit from discharge to post-acute rehabilitation services. Please also refer to the recommendation of the Physical Therapist for safe discharge planning.        Rehab Resource Intensity Level, PT: II (Moderate Resource Intensity) (return to facility with therapy services and appropriate level of  assistance)    See flowsheet documentation for full assessment.

## 2024-02-08 NOTE — PLAN OF CARE
Problem: OCCUPATIONAL THERAPY ADULT  Goal: Performs self-care activities at highest level of function for planned discharge setting.  See evaluation for individualized goals.  Description: Treatment Interventions: ADL retraining, Functional transfer training, UE strengthening/ROM, Endurance training, Patient/family training, Equipment evaluation/education, Cognitive reorientation, Fine motor coordination activities, Compensatory technique education, Continued evaluation, Energy conservation, Activityengagement, Neuromuscular reeducation          See flowsheet documentation for full assessment, interventions and recommendations.   Note: Limitation: Decreased ADL status, Decreased UE ROM, Decreased Safe judgement during ADL, Decreased UE strength, Decreased endurance, Decreased fine motor control, Decreased self-care trans, Decreased high-level ADLs  Prognosis: Fair  Assessment: Pt seen for OT re-evaluation. PTA, pt requires for ADLs/IADLs (does report being able to perform eating, grooming, and UB ADLs w/o A). Upon re-evaluation, pt currently requires MAX A x 1 to roll, MAX A x 2 sup <> sit. Exhibits decreased strength to B/L UE's limiting performance in ADLs/transfers. Pt currently requires S eating and grooming, MOD A UB ADLs, TOTAL A LB ADLs, and TOTAL A toileting. Pt is limited at this time 2*: pain, endurance, activity tolerance, functional mobility, balance, decreased I w/ ADLS/IADLS, strength, ROM, and impaired fine motor skills.The following Occupational Performance Areas to address include: eating, grooming, bathing/shower, toilet hygiene, dressing, functional mobility, community mobility, and clothing management. Pt to benefit from immediate acute skilled OT to address above deficits, improve overall functional independence, maximize fnxl mobility and reduce caregiver burden. From OT standpoint, recommendation at time of d/c would be return to facility with rehab services.  Pt was left after session with  all current needs met. The patient's raw score on the AM-PAC Daily Activity Inpatient Short Form is 12. A raw score of less than 19 suggests the patient may benefit from discharge to post-acute rehabilitation services. Please refer to the recommendation of the Occupational Therapist for safe discharge planning.     Rehab Resource Intensity Level, OT: III (Minimum Resource Intensity) (return to facility with rehab services)

## 2024-02-08 NOTE — PROGRESS NOTES
Progress Note - Infectious Disease   Bhavna Al 73 y.o. female MRN: [id number]  Unit/Bed#: [facility] 918-01 Encounter: [id number]      Impression/Plan:    1.  Sepsis.  Appears to be secondary to MSSA and Staphylococcus lugdunensis bacteremia.  The patient does have significant rash with areas of potential cutaneous breakdown that could lead to a transient bacteremia.  Consideration for the possibility of staph scalded skin syndrome.  The patient remains hemodynamically stable despite her systemic illness.  Procalcitonin level has been quite high.  Still with intermittent low-grade fever which may not be infection related but rather inflammatory related to the rheumatoid arthritis.  Repeat procalcitonin level has continued to decrease, CRP has also decreased since starting the steroids. Repeat blood cultures negative on 1/25.  -Antibiotics as below  -Check CBC with differential and BMP at least weekly to make sure no developing antibiotic associated toxicities or worsening infection     2.  MSSA and Staphylococcus lugdunensis bacteremia.  Suspected cutaneous translocation in the setting of advanced psoriasis.  Consideration for the possibility of endovascular infection.  Consideration for the possibility of staph scalded skin syndrome.  Poor windows on transthoracic echocardiogram but no valvular vegetation reported.  Repeat blood cultures are negative on 1/25.  -Continue cefazolin through 2/22/2024 to complete 4 weeks from the first negative blood culture  -Remove PICC line after last dose of IV antibiotics  -Check CBC with differential and creatinine weekly while on the cefazolin  -ID follow up scheduled for 2/19 at 8:30 AM with Ulysses Ardon     3.  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  The patient has desquamating but the lesions appear to be drying and crusting.  As patient is improved and no longer requiring pressor support, will not add agents such as linezolid to suppress toxin production.  -Antibiotics  as above  -Dermatology follow-up  -Topical treatment as needed  -Serial exams     4.  Acute encephalopathy.  In the setting of sepsis from bacteremia.  No headache or stiff neck to suggest a primary CNS infection.  Suspect multifactorial in this patient with baseline cognitive issues.  Clinically improved.  -Monitor cognition  -Antibiotics as above for now  -Additional workup as needed     5.  Psoriasis.  Not as active as in the past.  Has been evaluated by dermatology  -Outpatient dermatology follow-up     6.  Atrial fibrillation.  With a rapid ventricular response.  On rate control and anticoagulation     7.  Acute kidney injury.  Complicating chronic kidney disease.  Stage III.  Suspect prerenal issues playing a significant role.  Consideration for the possibility of sepsis playing a role.  Renal function is improved  -Recheck serum Cr at least weekly as above while on antibiotic  -Volume management     8.  Rheumatoid arthritis.  On low-dose prednisone and hydroxychloroquine which had been held.  Seems to be flaring as far as joint disease.  The right hand however is more prominent than the left hand as far as swelling and involvement.  Regardless the swelling and tenderness seems to have significantly improved since restarting the steroids and Plaquenil.  -Serial exams  -Steroids and Plaquenil per primary     Discussed with the primary service the plan to continue the intravenous cefazolin through 2/22/2024.  They agree with the plan.  ID will reassess patient on 2/12, call sooner if needed.    Antibiotics:  Cefazolin: 15  Total abx days: 16    24 Hour Events:  Afebrile.     Subjective:  Patient has no fever, chills, sweats. Right hand pain is at baseline and swelling has resolved. She cannot move right hand that well but tells me this is baseline. Denies other major joint pains.     Objective:  Vitals:  Temp:  [97.8 °F (36.6 °C)-98.8 °F (37.1 °C)] 97.8 °F (36.6 °C)  HR:  [67-88] 70  Resp:  [16-17] 16  BP:  (128-154)/(67-83) 128/67  SpO2:  [97 %] 97 %  Temp (24hrs), Av.3 °F (36.8 °C), Min:97.8 °F (36.6 °C), Max:98.8 °F (37.1 °C)  Current: Temperature: 97.8 °F (36.6 °C)    Physical Exam:   General Appearance:  Alert, interactive, nontoxic, no acute distress.   Throat: Oropharynx moist without lesions.    Lungs:   Clear to auscultation bilaterally; no wheezes, rhonchi or rales; respirations unlabored   Heart:  RRR; no murmur, rub or gallop   Abdomen:   Soft, non-tender, non-distended, positive bowel sounds.     Extremities: No clubbing. Right hand swelling resolved, hand deformities consistent with RA.   Skin: No new rashes; dry/peeling skin noted on both arms and face which is resolving       Labs:   All pertinent labs and imaging studies were personally reviewed  Results from last 7 days   Lab Units 24  0524  04524  0454   WBC Thousand/uL 5.47 5.29 4.57   HEMOGLOBIN g/dL 9.9* 10.2* 10.0*   PLATELETS Thousands/uL 214 218 188     Results from last 7 days   Lab Units 24  0510 24  0519 24  0454 24  0454 24  0530   SODIUM mmol/L 142 143 143 141 140   POTASSIUM mmol/L 2.9* 4.0 3.8 4.0 3.4*   CHLORIDE mmol/L 103 107 108 110* 109*   CO2 mmol/L 34* 29 26 25 27   BUN mg/dL 17 19 19 16 16   CREATININE mg/dL 0.90 0.99 1.04 1.00 0.99   EGFR ml/min/1.73sq m 62 55 52 55 55   CALCIUM mg/dL 8.3* 8.8 8.7 8.4 7.8*   AST U/L  --   --   --  33 35   ALT U/L  --   --   --  <3* <3*   ALK PHOS U/L  --   --   --  72 64                           Micro:        Imaging:          Rodri Hartman MD  [facility]

## 2024-02-08 NOTE — PHYSICAL THERAPY NOTE
PHYSICAL THERAPY NOTE          Patient Name: Bhavna Al  Today's Date: 2/8/2024 02/08/24 1035   PT Last Visit   PT Visit Date 02/08/24   Note Type   Note Type Treatment for insurance authorization   Pain Assessment   Pain Assessment Tool 0-10   Pain Score 10 - Worst Possible Pain   Pain Location/Orientation Orientation: Bilateral;Location: Knee   Hospital Pain Intervention(s) Repositioned;Ambulation/increased activity;Emotional support   Restrictions/Precautions   Weight Bearing Precautions Per Order No   Other Precautions Multiple lines;Fall Risk;Pain   General   Chart Reviewed Yes   Response to Previous Treatment Patient reporting fatigue but able to participate.   Family/Caregiver Present No   Cognition   Overall Cognitive Status WFL   Arousal/Participation Responsive;Cooperative   Attention Attends with cues to redirect   Orientation Level Oriented X4   Memory Within functional limits   Following Commands Follows one step commands with increased time or repetition   Comments pleasant and cooperative; expresses fear of falling   Bed Mobility   Rolling R 2  Maximal assistance   Additional items Assist x 1;Bedrails;Increased time required;Verbal cues   Rolling L 2  Maximal assistance   Additional items Assist x 1;Bedrails;Increased time required;Verbal cues   Supine to Sit 2  Maximal assistance   Additional items Assist x 2;Increased time required;Verbal cues;LE management   Sit to Supine 2  Maximal assistance   Additional items Assist x 2;Increased time required;Verbal cues;LE management   Additional Comments pt supine in bed upon arrival. Pt left supine in bed with all needs within reach   Transfers   Sit to Stand Unable to assess   Stand to Sit Unable to assess   Additional Comments not appropriate to progress at this time 2* poor sitting tolerance/ balance   Ambulation/Elevation   Gait pattern Not  appropriate  (non-ambulatory at baseline)   Balance   Static Sitting Poor   Dynamic Sitting Poor -   Endurance Deficit   Endurance Deficit Yes   Activity Tolerance   Activity Tolerance Patient limited by fatigue   Medical Staff Made Aware OT; co-session completed this date 2* increased medical complexity and multiple co-morbidities   Nurse Made Aware RN cleared pt to participate in therapy session   Exercises   Balance training  sitting EOB 3-4 min requiring min- mod A to correct L lateral and posterior lean   Assessment   Prognosis Guarded   Problem List Decreased strength;Decreased range of motion;Decreased endurance;Decreased mobility;Impaired balance;Impaired judgement;Decreased safety awareness;Pain   Assessment Pt seen for PT treatment session this date. Therapy session focused on bed mobility, sitting tolerance/ balance and endurance training in order to improve overall mobility and independence. Pt requires max A x1-2 for all mobility performed this date. Was able to get to EOB today with min- mod A required to maintain upright balance. Upright tolerance limited by fear of falling as well as pt requesting to use bed pan. Increased time + rolling required for hygiene tasks. Pt making slow but steady progress toward goals. Pt was left supine in bed at the end of PT session with all needs in reach. Pt would benefit from continued PT services while in hospital to address remaining limitations. PT to continue to follow pt and recommends return to facility with therapy services and appropriate level of assistance. The patient's AM-PAC Basic Mobility Inpatient Short Form Raw Score is 6. A Raw score of less than or equal to 16 suggests the patient may benefit from discharge to post-acute rehabilitation services. Please also refer to the recommendation of the Physical Therapist for safe discharge planning.   Goals   Patient Goals to go to the bathroom   STG Expiration Date 02/20/24   PT Treatment Day 1   Plan    Treatment/Interventions Functional transfer training;LE strengthening/ROM;Therapeutic exercise;Endurance training;Patient/family training;Equipment eval/education;Bed mobility;Spoke to nursing;Spoke to case management;OT   Progress Slow progress, decreased activity tolerance   PT Frequency 1-2x/wk   Discharge Recommendation   Rehab Resource Intensity Level, PT II (Moderate Resource Intensity)  (return to facility with therapy services and appropriate level of assistance)   Equipment Recommended Wheelchair  (vamshi lift for OOB)   AM-PAC Basic Mobility Inpatient   Turning in Flat Bed Without Bedrails 1   Lying on Back to Sitting on Edge of Flat Bed Without Bedrails 1   Moving Bed to Chair 1   Standing Up From Chair Using Arms 1   Walk in Room 1   Climb 3-5 Stairs With Railing 1   Basic Mobility Inpatient Raw Score 6   Highest Level Of Mobility   JH-HLM Goal 2: Bed activities/Dependent transfer   JH-HLM Achieved 3: Sit at edge of bed   Education   Education Provided Mobility training   Patient Reinforcement needed   End of Consult   Patient Position at End of Consult Supine;All needs within reach       Batsheva Bailey, PT, DPT

## 2024-02-08 NOTE — PROGRESS NOTES
"[facility]  Progress Note  Name: Bhavna Al I  MRN: [id number]  Unit/Bed#: [facility] 918-01 I Date of Admission: 1/22/2024   Date of Service: 2/8/2024 I Hospital Day: 16    Assessment/Plan   Localized swelling on right hand  Assessment & Plan  By reviewing the records previously, patient had a localized swelling of the left upper extremities.  Now with localized swelling of the right hand.  Doppler negative.   XR of hand on 1/28 noting: \"joint space widening and possible erosions involving the first interphalangeal joint. There is also suggestion of soft tissue swelling at the first phalanx. Cannot exclude septic arthropathy in the appropriate clinical setting \"  Previous provider discussed with rheumatology -> recommended to continue with outpatient regimen and monitor  Swelling/tenderness continues to improve over the last several days with increased range of motion and decreased discomfort    Hematuria  Assessment & Plan  Hematuria currently resolved s/p irrigation  Appreciate urology input  Resumed anticoagulation (Eliquis)     Encephalopathy  Assessment & Plan  Likely toxic metabolic encephalopathy due to septic shock and decreased oral intake  Mentation progressively improving to baseline over the last several days   Limit/avoid sedating agents as possible  Resolved    Dermatitis associated with incontinence  Assessment & Plan  Skin moisturization with Eucerin  In order to avoid further skin infection possibly portal of entry being sites of skin breakdown, Corey catheter in place  Voiding trial once the skin lesions have eventually desquamated    Chronic diastolic heart failure (HCC)  Assessment & Plan  Resume lasix  Continue beta-blockade with Lopressor  Fluid restriction enforced    Pustular psoriasis  Assessment & Plan  Known history of biopsy-proven pustular psoriasis  Appreciate dermatology input   Patient planning by dermatology for biologic treatment    Paroxysmal " atrial fibrillation (HCC)  Assessment & Plan  Rate controlled on Lopressor  Anticoagulation (Eliquis) now resumed    Acute renal failure superimposed on stage 3 chronic kidney disease ([facility])  Assessment & Plan  Baseline creatinine of approximately 1.5-1.6 prior peak of 4.0 -> progressively improved currently at 0.99  Monitor renal function and urine output - limit/avoid nephrotoxins and hypotension as possible  Diuretics (Lasix) resumed now    Morbid obesity ([facility])  Assessment & Plan  BMI of 44.13 currently  Lifestyle/diet modifications    Essential hypertension  Assessment & Plan  Continue Lopressor    Rheumatoid arthritis ([facility])  Assessment & Plan  Continue Prednisone/Plaquenil  Outpatient rheumatology follow-up  Condition likely contributory to hand swelling    * Septic shock ([facility])  Assessment & Plan  Septic shock now resolved - due to staphylococcal bacteremia and subsequent cellulitis   Transiently required vasopressor support in the ICU and stress dose steroids, now stabilized and transferred to the medical floor  Continue on IV Ancef per ID recommendations through 2/22  Monitor vitals and maintain hemodynamics  Repeat blood cultures from 1/25 are negative - initial cultures grew multi-strain staphylococcal bacteremia - echocardiogram negative for evidence of endocarditis - see plan for staphylococcal infection below  Resolved             VTE Pharmacologic Prophylaxis:   Moderate Risk (Score 3-4) - Pharmacological DVT Prophylaxis Ordered: apixaban (Eliquis).    Mobility:   Basic Mobility Inpatient Raw Score: 6  JH-HLM Goal: 2: Bed activities/Dependent transfer  JH-HLM Achieved: 2: Bed activities/Dependent transfer  HLM Goal achieved. Continue to encourage appropriate mobility.    Patient Centered Rounds: I performed bedside rounds with nursing staff today.   Discussions with Specialists or Other Care Team Provider: hanny    Education and Discussions with Family / Patient:  d/q granddaughter 2/7.     Total Time Spent  on Date of Encounter in care of patient: 35 mins. This time was spent on one or more of the following: performing physical exam; counseling and coordination of care; obtaining or reviewing history; documenting in the medical record; reviewing/ordering tests, medications or procedures; communicating with other healthcare professionals and discussing with patient's family/caregivers.    Current Length of Stay: 16 day(s)  Current Patient Status: Inpatient   Certification Statement: The patient will continue to require additional inpatient hospital stay due to IV lasix, hypoakelamia   Discharge Plan: Anticipate discharge in 24-48 hrs to rehab facility.    Code Status: Level 1 - Full Code    Subjective:   Feels ok. No sob.     Objective:     Vitals:   Temp (24hrs), Av.3 °F (36.8 °C), Min:97.8 °F (36.6 °C), Max:98.8 °F (37.1 °C)    Temp:  [97.8 °F (36.6 °C)-98.8 °F (37.1 °C)] 97.8 °F (36.6 °C)  HR:  [67-88] 70  Resp:  [16-17] 16  BP: (128-154)/(67-83) 128/67  SpO2:  [97 %] 97 %  Body mass index is 44.17 kg/m².     Input and Output Summary (last 24 hours):     Intake/Output Summary (Last 24 hours) at 2024 1414  Last data filed at 2024 0727  Gross per 24 hour   Intake 130 ml   Output 2925 ml   Net -2795 ml       Physical Exam:   Physical Exam  Vitals and nursing note reviewed.   Constitutional:       General: She is not in acute distress.     Appearance: She is well-developed.   HENT:      Head: Normocephalic and atraumatic.   Eyes:      Conjunctiva/sclera: Conjunctivae normal.   Cardiovascular:      Rate and Rhythm: Normal rate and regular rhythm.      Heart sounds: No murmur heard.  Pulmonary:      Effort: Pulmonary effort is normal. No respiratory distress.      Breath sounds: Normal breath sounds.   Abdominal:      Palpations: Abdomen is soft.      Tenderness: There is no abdominal tenderness.   Musculoskeletal:      Cervical back: Neck supple.      Right lower leg: Edema present.      Left lower leg: Edema  present.   Skin:     General: Skin is warm and dry.      Capillary Refill: Capillary refill takes less than 2 seconds.   Neurological:      Mental Status: She is alert.   Psychiatric:         Mood and Affect: Mood normal.          Additional Data:     Labs:  Results from last 7 days   Lab Units 02/06/24  0519   WBC Thousand/uL 5.47   HEMOGLOBIN g/dL 9.9*   HEMATOCRIT % 32.5*   PLATELETS Thousands/uL 214   NEUTROS PCT % 85*   LYMPHS PCT % 8*   MONOS PCT % 5   EOS PCT % 1     Results from last 7 days   Lab Units 02/08/24  0510 02/05/24  0454 02/04/24  0454   SODIUM mmol/L 142   < > 141   POTASSIUM mmol/L 2.9*   < > 4.0   CHLORIDE mmol/L 103   < > 110*   CO2 mmol/L 34*   < > 25   BUN mg/dL 17   < > 16   CREATININE mg/dL 0.90   < > 1.00   ANION GAP mmol/L 5   < > 6   CALCIUM mg/dL 8.3*   < > 8.4   ALBUMIN g/dL  --   --  2.6*   TOTAL BILIRUBIN mg/dL  --   --  0.29   ALK PHOS U/L  --   --  72   ALT U/L  --   --  <3*   AST U/L  --   --  33   GLUCOSE RANDOM mg/dL 77   < > 71    < > = values in this interval not displayed.         Results from last 7 days   Lab Units 02/08/24  1123 02/08/24  0727 02/07/24  2051 02/07/24  1708 02/06/24  2104 02/06/24  1619 02/06/24  1102 02/06/24  0756 02/05/24  2101 02/05/24  1636 02/05/24  1107 02/05/24  0732   POC GLUCOSE mg/dl 103 80 117 93 116 124 115 89 118 90 127 75               Lines/Drains:  Invasive Devices       Peripherally Inserted Central Catheter Line  Duration             PICC Line 01/29/24 9 days              Drain  Duration             Urethral Catheter Straight-tip 18 Fr. 10 days                  Urinary Catheter:  Goal for removal: Voiding trial when ambulation improves         Central Line:  Goal for removal: Will discontinue when meds requiring line are completed.             Imaging: Reviewed radiology reports from this admission including: chest xray    Recent Cultures (last 7 days):         Last 24 Hours Medication List:   Current Facility-Administered Medications    Medication Dose Route Frequency Provider Last Rate    acetaminophen  650 mg Oral Q4H PRN Valentine Brewer MD      albuterol  2 puff Inhalation Q6H PRN Pepe Do MD      apixaban  5 mg Oral BID Ann Moe MD      cefazolin  2,000 mg Intravenous Q8H Sandra Boogie, CRNP 2,000 mg (02/08/24 0506)    chlorhexidine  15 mL Mouth/Throat Q12H FAMILIA Sandra Boogie, CRNP      cholecalciferol  1,000 Units Oral Daily Pepe Do MD      Diclofenac Sodium  2 g Topical 4x Daily Toy Rodriguez MD      docusate sodium  100 mg Oral BID PRN Pepe Do MD      furosemide  40 mg Intravenous BID (diuretic) Toy Rodriguez MD      hydroxychloroquine  200 mg Oral BID Valentine Brewer MD      metoprolol  5 mg Intravenous Q6H PRN Sandra Rodrigueza, CRNP      metoprolol tartrate  12.5 mg Oral Q12H [facility] Issac Minor DO      ondansetron  4 mg Intravenous Q6H PRN Pepe Do MD      pantoprazole  40 mg Oral Early Morning Sandra Boogie, CRNP      potassium chloride  40 mEq Oral BID Toy Rodriguez MD      predniSONE  5 mg Oral BID With Meals Toy Rodriguez MD      white petrolatum-mineral oil   Topical TID Pepe Do MD          Today, Patient Was Seen By: Toy Rodriguez MD    **Please Note: This note may have been constructed using a voice recognition system.**

## 2024-02-09 LAB
ANION GAP SERPL CALCULATED.3IONS-SCNC: 9 MMOL/L
BUN SERPL-MCNC: 15 MG/DL (ref 5–25)
CALCIUM SERPL-MCNC: 8.4 MG/DL (ref 8.4–10.2)
CHLORIDE SERPL-SCNC: 101 MMOL/L (ref 96–108)
CO2 SERPL-SCNC: 33 MMOL/L (ref 21–32)
CREAT SERPL-MCNC: 0.87 MG/DL (ref 0.6–1.3)
GFR SERPL CREATININE-BSD FRML MDRD: 65 ML/MIN/1.73SQ M
GLUCOSE SERPL-MCNC: 127 MG/DL (ref 65–140)
GLUCOSE SERPL-MCNC: 135 MG/DL (ref 65–140)
GLUCOSE SERPL-MCNC: 79 MG/DL (ref 65–140)
GLUCOSE SERPL-MCNC: 91 MG/DL (ref 65–140)
POTASSIUM SERPL-SCNC: 3.5 MMOL/L (ref 3.5–5.3)
SODIUM SERPL-SCNC: 143 MMOL/L (ref 135–147)

## 2024-02-09 PROCEDURE — 99232 SBSQ HOSP IP/OBS MODERATE 35: CPT | Performed by: INTERNAL MEDICINE

## 2024-02-09 PROCEDURE — 80048 BASIC METABOLIC PNL TOTAL CA: CPT | Performed by: INTERNAL MEDICINE

## 2024-02-09 PROCEDURE — 82948 REAGENT STRIP/BLOOD GLUCOSE: CPT

## 2024-02-09 RX ORDER — MAGNESIUM SULFATE HEPTAHYDRATE 40 MG/ML
2 INJECTION, SOLUTION INTRAVENOUS ONCE
Status: COMPLETED | OUTPATIENT
Start: 2024-02-09 | End: 2024-02-09

## 2024-02-09 RX ORDER — POTASSIUM CHLORIDE 20 MEQ/1
40 TABLET, EXTENDED RELEASE ORAL 2 TIMES DAILY
Status: COMPLETED | OUTPATIENT
Start: 2024-02-09 | End: 2024-02-09

## 2024-02-09 RX ADMIN — POTASSIUM CHLORIDE 40 MEQ: 1500 TABLET, EXTENDED RELEASE ORAL at 17:44

## 2024-02-09 RX ADMIN — CEFAZOLIN SODIUM 2000 MG: 2 SOLUTION INTRAVENOUS at 15:12

## 2024-02-09 RX ADMIN — Medication 2 G: at 12:47

## 2024-02-09 RX ADMIN — MAGNESIUM SULFATE HEPTAHYDRATE 2 G: 40 INJECTION, SOLUTION INTRAVENOUS at 16:15

## 2024-02-09 RX ADMIN — CHLORHEXIDINE GLUCONATE 15 ML: 1.2 SOLUTION ORAL at 08:22

## 2024-02-09 RX ADMIN — APIXABAN 5 MG: 5 TABLET, FILM COATED ORAL at 17:44

## 2024-02-09 RX ADMIN — CEFAZOLIN SODIUM 2000 MG: 2 SOLUTION INTRAVENOUS at 22:44

## 2024-02-09 RX ADMIN — Medication 2 G: at 08:23

## 2024-02-09 RX ADMIN — Medication 1000 UNITS: at 08:22

## 2024-02-09 RX ADMIN — POTASSIUM CHLORIDE 40 MEQ: 1500 TABLET, EXTENDED RELEASE ORAL at 08:22

## 2024-02-09 RX ADMIN — Medication 12.5 MG: at 08:22

## 2024-02-09 RX ADMIN — PANTOPRAZOLE SODIUM 40 MG: 40 TABLET, DELAYED RELEASE ORAL at 05:12

## 2024-02-09 RX ADMIN — ACETAMINOPHEN 650 MG: 325 TABLET, FILM COATED ORAL at 08:22

## 2024-02-09 RX ADMIN — CHLORHEXIDINE GLUCONATE 15 ML: 1.2 SOLUTION ORAL at 22:45

## 2024-02-09 RX ADMIN — HYDROXYCHLOROQUINE SULFATE 200 MG: 200 TABLET, FILM COATED ORAL at 10:14

## 2024-02-09 RX ADMIN — Medication: at 08:23

## 2024-02-09 RX ADMIN — Medication 2 G: at 17:45

## 2024-02-09 RX ADMIN — Medication 12.5 MG: at 22:44

## 2024-02-09 RX ADMIN — FUROSEMIDE 40 MG: 10 INJECTION, SOLUTION INTRAMUSCULAR; INTRAVENOUS at 15:14

## 2024-02-09 RX ADMIN — APIXABAN 5 MG: 5 TABLET, FILM COATED ORAL at 08:22

## 2024-02-09 RX ADMIN — Medication: at 23:02

## 2024-02-09 RX ADMIN — CEFAZOLIN SODIUM 2000 MG: 2 SOLUTION INTRAVENOUS at 05:12

## 2024-02-09 RX ADMIN — PREDNISONE 5 MG: 5 TABLET ORAL at 16:15

## 2024-02-09 RX ADMIN — FUROSEMIDE 40 MG: 10 INJECTION, SOLUTION INTRAMUSCULAR; INTRAVENOUS at 08:22

## 2024-02-09 RX ADMIN — HYDROXYCHLOROQUINE SULFATE 200 MG: 200 TABLET, FILM COATED ORAL at 17:44

## 2024-02-09 RX ADMIN — Medication: at 15:13

## 2024-02-09 RX ADMIN — Medication 2 G: at 22:45

## 2024-02-09 RX ADMIN — PREDNISONE 5 MG: 5 TABLET ORAL at 08:22

## 2024-02-09 NOTE — DISCHARGE SUMMARY
"[facility]  Discharge- Bhavna Al 1950, 73 y.o. female MRN: [id number]  [facility] Encounter: [id number]  Primary Care Provider: Nya Taveras DO   Date and time admitted to hospital: 1/22/2024 10:59 PM    * Septic shock (HCC)-resolved as of 2/10/2024  Assessment & Plan  Septic shock now resolved - due to staphylococcal bacteremia and subsequent cellulitis   Transiently required vasopressor support in the ICU and stress dose steroids, now stabilized and transferred to the medical floor  Continue on IV Ancef per ID recommendations through 2/22  Monitor vitals and maintain hemodynamics  Repeat blood cultures from 1/25 are negative - initial cultures grew multi-strain staphylococcal bacteremia - echocardiogram negative for evidence of endocarditis - see plan for staphylococcal infection below  Resolved    Chronic diastolic heart failure (HCC)  Assessment & Plan  Patient with iatrogenic volume overload  Received multiple days of IV Lasix with good response.  Transition to oral Lasix on discharge  Continue other cardiac medications  Remains on room air with no respiratory symptoms    Dermatitis associated with incontinence  Assessment & Plan  Skin moisturization with Eucerin  In order to avoid further skin infection possibly portal of entry being sites of skin breakdown, Corey catheter in place  Voiding trial once the skin lesions have eventually desquamated    Essential hypertension  Assessment & Plan  Continue Lopressor and Lasix    Rheumatoid arthritis (HCC)  Assessment & Plan  Continue Prednisone/Plaquenil  Outpatient rheumatology follow-up    Localized swelling on right hand-resolved as of 2/10/2024  Assessment & Plan  By reviewing the records previously, patient had a localized swelling of the left upper extremities.  Now with localized swelling of the right hand.  Doppler negative.   XR of hand on 1/28 noting: \"joint space widening and possible erosions " "involving the first interphalangeal joint. There is also suggestion of soft tissue swelling at the first phalanx. Cannot exclude septic arthropathy in the appropriate clinical setting \"  Previous provider discussed with rheumatology -> recommended to continue with outpatient regimen and monitor  Swelling/tenderness continues to improve over the last several days with increased range of motion and decreased discomfort    Hematuria-resolved as of 2/10/2024  Assessment & Plan  Hematuria currently resolved s/p irrigation  Appreciate urology input  Resumed anticoagulation (Eliquis)     Encephalopathy-resolved as of 2/10/2024  Assessment & Plan  Likely toxic metabolic encephalopathy due to septic shock and decreased oral intake  Mentation progressively improving to baseline over the last several days   Limit/avoid sedating agents as possible  Resolved    Acute renal failure superimposed on stage 3 chronic kidney disease (HCC)-resolved as of 2/10/2024  Assessment & Plan  Baseline creatinine of approximately 1.5-1.6 prior peak of 4.0 -> progressively improved currently at 0.99  Monitor renal function and urine output - limit/avoid nephrotoxins and hypotension as possible  Diuretics (Lasix) resumed now            Medical Problems       Resolved Problems  Date Reviewed: 2/9/2024            Resolved    Acute renal failure superimposed on stage 3 chronic kidney disease (HCC) 2/10/2024     Resolved by  Toy Rodriguez MD    * (Principal) Septic shock (HCC) 2/10/2024     Resolved by  Toy Rodriguez MD    Hypernatremia 2/6/2024     Resolved by  Toy Rodriguez MD    Encephalopathy 2/10/2024     Resolved by  Toy Rodriguez MD    Hematuria 2/10/2024     Resolved by  Toy Rodriguez MD    Localized swelling on right hand 2/10/2024     Resolved by  Toy Rodriguez MD          Admission Date:   Admission Orders (From admission, onward)       Ordered        01/23/24 0248  Inpatient Admission  Once                            Admitting Diagnosis: " Cellulitis [L03.90]  Altered mental status [R41.82]  Atrial fibrillation with RVR ([facility]) [I48.91]  Sepsis ([facility]) [A41.9]    HPI: Admitted for septic shock     Procedures Performed:   Orders Placed This Encounter   Procedures    Critical Care       Summary of Hospital Course: Patient admitted for septic shock secondary to MSSA and Staphylococcus lugdunensis bacteremia suspected secondary to cutaneous translocation in setting of advanced psoriasis started on IV cefazolin with clearance of bacteremia and will need to be continued through February 22, 2024.  Hospital course also is notable for metabolic encephalopathy, AMANDA and iatrogenic volume overload.  Please refer to above assessment and plan for further details.  Patient is currently with no acute complaints and stable for discharge back to her facility      Complications: none     Condition at Discharge: stable         Discharge instructions/Information to patient and family:   See after visit summary for information provided to patient and family.      Provisions for Follow-Up Care:  See after visit summary for information related to follow-up care and any pertinent home health orders.      PCP: Nya Taveras DO    Disposition:  return to facility     Planned Readmission: No    Discharge Statement   I spent 32 minutes discharging the patient. This time was spent on the day of discharge. I had direct contact with the patient on the day of discharge. Additional documentation is required if more than 30 minutes were spent on discharge.     Discharge Medications:  See after visit summary for reconciled discharge medications provided to patient and family.        Discharge date 2/10/2024

## 2024-02-09 NOTE — ASSESSMENT & PLAN NOTE
Skin moisturization with Eucerin  In order to avoid further skin infection possibly portal of entry being sites of skin breakdown, Corey catheter in place  Voiding trial once the skin lesions have eventually desquamated   yes

## 2024-02-09 NOTE — PLAN OF CARE
Problem: Prexisting or High Potential for Compromised Skin Integrity  Goal: Skin integrity is maintained or improved  Description: INTERVENTIONS:  - Identify patients at risk for skin breakdown  - Assess and monitor skin integrity  - Assess and monitor nutrition and hydration status  - Monitor labs   - Assess for incontinence   - Turn and reposition patient  - Assist with mobility/ambulation  - Relieve pressure over bony prominences  - Avoid friction and shearing  - Provide appropriate hygiene as needed including keeping skin clean and dry  - Evaluate need for skin moisturizer/barrier cream  - Collaborate with interdisciplinary team   - Patient/family teaching  - Consider wound care consult   Outcome: Progressing     Problem: PAIN - ADULT  Goal: Verbalizes/displays adequate comfort level or baseline comfort level  Description: Interventions:  - Encourage patient to monitor pain and request assistance  - Assess pain using appropriate pain scale  - Administer analgesics based on type and severity of pain and evaluate response  - Implement non-pharmacological measures as appropriate and evaluate response  - Consider cultural and social influences on pain and pain management  - Notify physician/advanced practitioner if interventions unsuccessful or patient reports new pain  Outcome: Progressing     Problem: INFECTION - ADULT  Goal: Absence or prevention of progression during hospitalization  Description: INTERVENTIONS:  - Assess and monitor for signs and symptoms of infection  - Monitor lab/diagnostic results  - Monitor all insertion sites, i.e. indwelling lines, tubes, and drains  - Monitor endotracheal if appropriate and nasal secretions for changes in amount and color  - [place] appropriate cooling/warming therapies per order  - Administer medications as ordered  - Instruct and encourage patient and family to use good hand hygiene technique  - Identify and instruct in appropriate isolation precautions for  identified infection/condition  Outcome: Progressing  Goal: Absence of fever/infection during neutropenic period  Description: INTERVENTIONS:  - Monitor WBC    Outcome: Progressing

## 2024-02-09 NOTE — PLAN OF CARE
Problem: PAIN - ADULT  Goal: Verbalizes/displays adequate comfort level or baseline comfort level  Description: Interventions:  - Encourage patient to monitor pain and request assistance  - Assess pain using appropriate pain scale  - Administer analgesics based on type and severity of pain and evaluate response  - Implement non-pharmacological measures as appropriate and evaluate response  - Consider cultural and social influences on pain and pain management  - Notify physician/advanced practitioner if interventions unsuccessful or patient reports new pain  Outcome: Progressing     Problem: Prexisting or High Potential for Compromised Skin Integrity  Goal: Skin integrity is maintained or improved  Description: INTERVENTIONS:  - Identify patients at risk for skin breakdown  - Assess and monitor skin integrity  - Assess and monitor nutrition and hydration status  - Monitor labs   - Assess for incontinence   - Turn and reposition patient  - Assist with mobility/ambulation  - Relieve pressure over bony prominences  - Avoid friction and shearing  - Provide appropriate hygiene as needed including keeping skin clean and dry  - Evaluate need for skin moisturizer/barrier cream  - Collaborate with interdisciplinary team   - Patient/family teaching  - Consider wound care consult   Outcome: Progressing   Problem: INFECTION - ADULT  Goal: Absence or prevention of progression during hospitalization  Description: INTERVENTIONS:  - Assess and monitor for signs and symptoms of infection  - Monitor lab/diagnostic results  - Monitor all insertion sites, i.e. indwelling lines, tubes, and drains  - Monitor endotracheal if appropriate and nasal secretions for changes in amount and color  - [place] appropriate cooling/warming therapies per order  - Administer medications as ordered  - Instruct and encourage patient and family to use good hand hygiene technique  - Identify and instruct in appropriate isolation precautions for  identified infection/condition  Outcome: Progressing  Goal: Absence of fever/infection during neutropenic period  Description: INTERVENTIONS:  - Monitor WBC  Outcome: Progressing

## 2024-02-09 NOTE — PROGRESS NOTES
"[facility]  Progress Note  Name: Bhavna Al I  MRN: [id number]  Unit/Bed#: [facility] 918-01 I Date of Admission: 1/22/2024   Date of Service: 2/9/2024 I Hospital Day: 17    Assessment/Plan   Localized swelling on right hand  Assessment & Plan  By reviewing the records previously, patient had a localized swelling of the left upper extremities.  Now with localized swelling of the right hand.  Doppler negative.   XR of hand on 1/28 noting: \"joint space widening and possible erosions involving the first interphalangeal joint. There is also suggestion of soft tissue swelling at the first phalanx. Cannot exclude septic arthropathy in the appropriate clinical setting \"  Previous provider discussed with rheumatology -> recommended to continue with outpatient regimen and monitor  Swelling/tenderness continues to improve over the last several days with increased range of motion and decreased discomfort    Hematuria  Assessment & Plan  Hematuria currently resolved s/p irrigation  Appreciate urology input  Resumed anticoagulation (Eliquis)     Encephalopathy  Assessment & Plan  Likely toxic metabolic encephalopathy due to septic shock and decreased oral intake  Mentation progressively improving to baseline over the last several days   Limit/avoid sedating agents as possible  Resolved    Dermatitis associated with incontinence  Assessment & Plan  Skin moisturization with Eucerin  In order to avoid further skin infection possibly portal of entry being sites of skin breakdown, Corey catheter in place  Voiding trial once the skin lesions have eventually desquamated    Chronic diastolic heart failure (HCC)  Assessment & Plan  Resume lasix  Continue beta-blockade with Lopressor  Fluid restriction enforced    Pustular psoriasis  Assessment & Plan  Known history of biopsy-proven pustular psoriasis  Appreciate dermatology input   Patient planning by dermatology for biologic treatment    Paroxysmal " atrial fibrillation (HCC)  Assessment & Plan  Rate controlled on Lopressor  Anticoagulation (Eliquis) now resumed    Acute renal failure superimposed on stage 3 chronic kidney disease ([facility])  Assessment & Plan  Baseline creatinine of approximately 1.5-1.6 prior peak of 4.0 -> progressively improved currently at 0.99  Monitor renal function and urine output - limit/avoid nephrotoxins and hypotension as possible  Diuretics (Lasix) resumed now    Morbid obesity ([facility])  Assessment & Plan  BMI of 44.13 currently  Lifestyle/diet modifications    Essential hypertension  Assessment & Plan  Continue Lopressor    Rheumatoid arthritis ([facility])  Assessment & Plan  Continue Prednisone/Plaquenil  Outpatient rheumatology follow-up  Condition likely contributory to hand swelling    * Septic shock ([facility])  Assessment & Plan  Septic shock now resolved - due to staphylococcal bacteremia and subsequent cellulitis   Transiently required vasopressor support in the ICU and stress dose steroids, now stabilized and transferred to the medical floor  Continue on IV Ancef per ID recommendations through 2/22  Monitor vitals and maintain hemodynamics  Repeat blood cultures from 1/25 are negative - initial cultures grew multi-strain staphylococcal bacteremia - echocardiogram negative for evidence of endocarditis - see plan for staphylococcal infection below  Resolved             VTE Pharmacologic Prophylaxis:   Moderate Risk (Score 3-4) - Pharmacological DVT Prophylaxis Ordered: apixaban (Eliquis).    Mobility:   Basic Mobility Inpatient Raw Score: 6  JH-HLM Goal: 2: Bed activities/Dependent transfer  JH-HLM Achieved: 2: Bed activities/Dependent transfer  HLM Goal achieved. Continue to encourage appropriate mobility.    Patient Centered Rounds: I performed bedside rounds with nursing staff today.   Discussions with Specialists or Other Care Team Provider: cm    Education and Discussions with Family / Patient: Updated  (daughter) via  phone.    Total Time Spent on Date of Encounter in care of patient: 35 mins. This time was spent on one or more of the following: performing physical exam; counseling and coordination of care; obtaining or reviewing history; documenting in the medical record; reviewing/ordering tests, medications or procedures; communicating with other healthcare professionals and discussing with patient's family/caregivers.    Current Length of Stay: 17 day(s)  Current Patient Status: Inpatient   Certification Statement: The patient will continue to require additional inpatient hospital stay due to placement  Discharge Plan: Anticipate discharge tomorrow to rehab facility.    Code Status: Level 1 - Full Code    Subjective:   No new complaints    Objective:     Vitals:   Temp (24hrs), Av.3 °F (36.8 °C), Min:97.7 °F (36.5 °C), Max:98.7 °F (37.1 °C)    Temp:  [97.7 °F (36.5 °C)-98.7 °F (37.1 °C)] 97.7 °F (36.5 °C)  HR:  [72-95] 80  Resp:  [16-19] 19  BP: (112-117)/(51-58) 117/58  SpO2:  [95 %-97 %] 96 %  Body mass index is 44.17 kg/m².     Input and Output Summary (last 24 hours):     Intake/Output Summary (Last 24 hours) at 2024 1359  Last data filed at 2024 1200  Gross per 24 hour   Intake 120 ml   Output 3175 ml   Net -3055 ml       Physical Exam:   Physical Exam  Vitals and nursing note reviewed.   Constitutional:       General: She is not in acute distress.     Appearance: She is well-developed.   HENT:      Head: Normocephalic and atraumatic.   Eyes:      Conjunctiva/sclera: Conjunctivae normal.   Cardiovascular:      Rate and Rhythm: Normal rate and regular rhythm.      Heart sounds: No murmur heard.  Pulmonary:      Effort: Pulmonary effort is normal. No respiratory distress.      Breath sounds: Normal breath sounds.   Abdominal:      Palpations: Abdomen is soft.      Tenderness: There is no abdominal tenderness.   Musculoskeletal:         General: No swelling.      Cervical back: Neck supple.      Right lower leg:  Edema present.      Left lower leg: Edema present.   Skin:     General: Skin is warm and dry.      Capillary Refill: Capillary refill takes less than 2 seconds.   Neurological:      Mental Status: She is alert.   Psychiatric:         Mood and Affect: Mood normal.          Additional Data:     Labs:  Results from last 7 days   Lab Units 02/06/24  0519   WBC Thousand/uL 5.47   HEMOGLOBIN g/dL 9.9*   HEMATOCRIT % 32.5*   PLATELETS Thousands/uL 214   NEUTROS PCT % 85*   LYMPHS PCT % 8*   MONOS PCT % 5   EOS PCT % 1     Results from last 7 days   Lab Units 02/09/24  0516 02/05/24  0454 02/04/24  0454   SODIUM mmol/L 143   < > 141   POTASSIUM mmol/L 3.5   < > 4.0   CHLORIDE mmol/L 101   < > 110*   CO2 mmol/L 33*   < > 25   BUN mg/dL 15   < > 16   CREATININE mg/dL 0.87   < > 1.00   ANION GAP mmol/L 9   < > 6   CALCIUM mg/dL 8.4   < > 8.4   ALBUMIN g/dL  --   --  2.6*   TOTAL BILIRUBIN mg/dL  --   --  0.29   ALK PHOS U/L  --   --  72   ALT U/L  --   --  <3*   AST U/L  --   --  33   GLUCOSE RANDOM mg/dL 79   < > 71    < > = values in this interval not displayed.         Results from last 7 days   Lab Units 02/09/24  1122 02/08/24  2121 02/08/24  1641 02/08/24  1123 02/08/24  0727 02/07/24  2051 02/07/24  1708 02/06/24  2104 02/06/24  1619 02/06/24  1102 02/06/24  0756 02/05/24  2101   POC GLUCOSE mg/dl 91 97 117 103 80 117 93 116 124 115 89 118               Lines/Drains:  Invasive Devices       Peripherally Inserted Central Catheter Line  Duration             PICC Line 01/29/24 10 days              Drain  Duration             Urethral Catheter Straight-tip 18 Fr. 11 days                  Urinary Catheter:  Goal for removal: Voiding trial when ambulation improves         Central Line:  Goal for removal: Will discontinue when meds requiring line are completed.             Imaging: Reviewed radiology reports from this admission including: xray(s)    Recent Cultures (last 7 days):         Last 24 Hours Medication List:    Current Facility-Administered Medications   Medication Dose Route Frequency Provider Last Rate    acetaminophen  650 mg Oral Q4H PRN Valentine Brewer MD      albuterol  2 puff Inhalation Q6H PRN Pepe Do MD      apixaban  5 mg Oral BID Ann Moe MD      cefazolin  2,000 mg Intravenous Q8H TYSHAWN WolfNP 2,000 mg (02/09/24 0512)    chlorhexidine  15 mL Mouth/Throat Q12H FAMILIA TYSHAWN WolfNP      cholecalciferol  1,000 Units Oral Daily Pepe Do MD      Diclofenac Sodium  2 g Topical 4x Daily Toy Rodriguez MD      docusate sodium  100 mg Oral BID PRN Pepe Do MD      furosemide  40 mg Intravenous BID (diuretic) Toy Rodriguez MD      hydroxychloroquine  200 mg Oral BID Valentine Brewer MD      magnesium sulfate  2 g Intravenous Once Toy Rodriguez MD      metoprolol  5 mg Intravenous Q6H PRN AZIZA Wolf      metoprolol tartrate  12.5 mg Oral Q12H [facility] Issac Minor DO      ondansetron  4 mg Intravenous Q6H PRN Pepe Do MD      pantoprazole  40 mg Oral Early Morning AZIZA Wolf      potassium chloride  40 mEq Oral BID Toy Rodriguez MD      predniSONE  5 mg Oral BID With Meals Toy Rodriguez MD      white petrolatum-mineral oil   Topical TID Pepe Do MD          Today, Patient Was Seen By: Toy Rodriguez MD    **Please Note: This note may have been constructed using a voice recognition system.**

## 2024-02-09 NOTE — CASE MANAGEMENT
[facility] received request for authorization from Care Manager.  Authorization request for: SNF  Facility Name: [facility] NPI: [id number]   Facility MD: Natasha Juan NPI: [id number]  Authorization initiated by contacting insurance: [facility]   Via: [employer] submitted via F#: [phone]  Pending Reference #: [id number]

## 2024-02-09 NOTE — ASSESSMENT & PLAN NOTE
Patient with iatrogenic volume overload  Received multiple days of IV Lasix with good response.  Transition to oral Lasix on discharge  Continue other cardiac medications  Remains on room air with no respiratory symptoms

## 2024-02-09 NOTE — CASE MANAGEMENT
Case Management Discharge Planning Note    Patient name Bhavna Al  Location [facility] 918-01 MRN [id number]  : 1950 Date 2024       Current Admission Date: 2024  Current Admission Diagnosis:Septic shock (HCC)   Patient Active Problem List    Diagnosis Date Noted    Localized swelling on right hand 2024    Hematuria 2024    Encephalopathy 2024    Localized swelling on left hand 2023    Chronic kidney disease 2023    Febrile illness 2023    Dermatitis associated with incontinence 2023    Right shoulder pain 12/15/2022    Abnormal urinalysis 2022    Ambulatory dysfunction 2022    Hyperuricemia 2022    Chronic diastolic heart failure (HCC) 2022    Acute bronchitis 2022    Assistance needed with transportation 09/15/2022    Abnormal CT of the chest 2022    Gram-positive cocci bacteremia 2022    Pustular psoriasis 2022    Elevated troponin 2022    COVID-19 2022    Acute renal failure superimposed on stage 3 chronic kidney disease (HCC) 01/10/2022    Paroxysmal atrial fibrillation ([facility]) 01/10/2022    Septic shock ([facility]) 01/10/2022    Hyperparathyroidism ([facility]) 2021    Murmur, cardiac 2021    BPPV (benign paroxysmal positional vertigo) 2018    Seasonal allergic rhinitis due to pollen 2018    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    Osteoporosis 2016    SIRS (systemic inflammatory response syndrome) ([facility]) 2016    Rheumatoid arthritis ([facility]) 2016    GERD (gastroesophageal reflux disease) 2016    Sarcoid 2016    Morbid obesity ([facility]) 2016    Vitamin D deficiency 2015    Essential hypertension 2014    Lumbar radiculopathy 2014      LOS (days): 17  Geometric Mean LOS (GMLOS) (days): 5.1  Days to GMLOS:-12.2     OBJECTIVE:  Risk of Unplanned Readmission Score: 18.33         Current admission status: Inpatient   Preferred  Pharmacy:   [id number] - [place], [place] - [address]  [address]  [place] [address]  Phone: [phone] Fax: [phone]    [facility] - [place], [place] - [address]  [address] [address]  Phone: [phone] Fax: [phone]    Primary Care Provider: Nya Taveras DO    Primary Insurance: [facility]  Secondary Insurance: [facility]    DISCHARGE DETAILS:    Discharge planning discussed with:: Daughter Rhoda        Contacts  Patient Contacts: Rhoda Al, daughter  Relationship to Patient:: Family  Contact Method: Phone  Phone Number: [phone]  Reason/Outcome: Discharge Planning       Other Referral/Resources/Interventions Provided:  Referral Comments: CM update that patient is medically cleared to return to [facility].  They do not have her bed ready for today.  Facility is requesting she return tomorrow with a transport time of 3pm.   Auth request has been submitted to  DC Support, pt can return prior to approval per SNF.  Request S transport for 2/10/24, pending confirmation.

## 2024-02-10 VITALS
BODY MASS INDEX: 44.18 KG/M2 | SYSTOLIC BLOOD PRESSURE: 120 MMHG | HEART RATE: 83 BPM | TEMPERATURE: 98.3 F | HEIGHT: 63 IN | OXYGEN SATURATION: 96 % | RESPIRATION RATE: 16 BRPM | WEIGHT: 249.34 LBS | DIASTOLIC BLOOD PRESSURE: 62 MMHG

## 2024-02-10 PROBLEM — R31.9 HEMATURIA: Status: RESOLVED | Noted: 2024-01-28 | Resolved: 2024-02-10

## 2024-02-10 PROBLEM — A41.9 SEPTIC SHOCK (HCC): Status: RESOLVED | Noted: 2022-01-10 | Resolved: 2024-02-10

## 2024-02-10 PROBLEM — N17.9 ACUTE RENAL FAILURE SUPERIMPOSED ON STAGE 3 CHRONIC KIDNEY DISEASE (HCC): Status: RESOLVED | Noted: 2022-01-10 | Resolved: 2024-02-10

## 2024-02-10 PROBLEM — N18.30 ACUTE RENAL FAILURE SUPERIMPOSED ON STAGE 3 CHRONIC KIDNEY DISEASE (HCC): Status: RESOLVED | Noted: 2022-01-10 | Resolved: 2024-02-10

## 2024-02-10 PROBLEM — R65.21 SEPTIC SHOCK (HCC): Status: RESOLVED | Noted: 2022-01-10 | Resolved: 2024-02-10

## 2024-02-10 PROBLEM — R22.31 LOCALIZED SWELLING ON RIGHT HAND: Status: RESOLVED | Noted: 2024-01-29 | Resolved: 2024-02-10

## 2024-02-10 PROBLEM — G93.40 ENCEPHALOPATHY: Status: RESOLVED | Noted: 2024-01-27 | Resolved: 2024-02-10

## 2024-02-10 LAB
GLUCOSE SERPL-MCNC: 85 MG/DL (ref 65–140)
GLUCOSE SERPL-MCNC: 89 MG/DL (ref 65–140)
MAGNESIUM SERPL-MCNC: 1.7 MG/DL (ref 1.9–2.7)
POTASSIUM SERPL-SCNC: 3.7 MMOL/L (ref 3.5–5.3)

## 2024-02-10 PROCEDURE — 99239 HOSP IP/OBS DSCHRG MGMT >30: CPT | Performed by: INTERNAL MEDICINE

## 2024-02-10 PROCEDURE — 83735 ASSAY OF MAGNESIUM: CPT | Performed by: INTERNAL MEDICINE

## 2024-02-10 PROCEDURE — 82948 REAGENT STRIP/BLOOD GLUCOSE: CPT

## 2024-02-10 PROCEDURE — 84132 ASSAY OF SERUM POTASSIUM: CPT | Performed by: INTERNAL MEDICINE

## 2024-02-10 RX ORDER — POTASSIUM CHLORIDE 20 MEQ/1
40 TABLET, EXTENDED RELEASE ORAL ONCE
Status: COMPLETED | OUTPATIENT
Start: 2024-02-10 | End: 2024-02-10

## 2024-02-10 RX ORDER — MAGNESIUM SULFATE HEPTAHYDRATE 40 MG/ML
2 INJECTION, SOLUTION INTRAVENOUS ONCE
Status: COMPLETED | OUTPATIENT
Start: 2024-02-10 | End: 2024-02-10

## 2024-02-10 RX ORDER — CEFAZOLIN SODIUM 2 G/50ML
2000 SOLUTION INTRAVENOUS EVERY 8 HOURS
Start: 2024-02-10 | End: 2024-02-23

## 2024-02-10 RX ORDER — POTASSIUM CHLORIDE 750 MG/1
20 TABLET, EXTENDED RELEASE ORAL DAILY
Start: 2024-02-10

## 2024-02-10 RX ADMIN — Medication 1000 UNITS: at 08:27

## 2024-02-10 RX ADMIN — FUROSEMIDE 40 MG: 10 INJECTION, SOLUTION INTRAMUSCULAR; INTRAVENOUS at 08:26

## 2024-02-10 RX ADMIN — CHLORHEXIDINE GLUCONATE 15 ML: 1.2 SOLUTION ORAL at 08:26

## 2024-02-10 RX ADMIN — PREDNISONE 5 MG: 5 TABLET ORAL at 08:27

## 2024-02-10 RX ADMIN — POTASSIUM CHLORIDE 40 MEQ: 1500 TABLET, EXTENDED RELEASE ORAL at 08:32

## 2024-02-10 RX ADMIN — CEFAZOLIN SODIUM 2000 MG: 2 SOLUTION INTRAVENOUS at 05:36

## 2024-02-10 RX ADMIN — PANTOPRAZOLE SODIUM 40 MG: 40 TABLET, DELAYED RELEASE ORAL at 05:36

## 2024-02-10 RX ADMIN — HYDROXYCHLOROQUINE SULFATE 200 MG: 200 TABLET, FILM COATED ORAL at 08:27

## 2024-02-10 RX ADMIN — Medication 2 G: at 08:27

## 2024-02-10 RX ADMIN — Medication 12.5 MG: at 08:27

## 2024-02-10 RX ADMIN — APIXABAN 5 MG: 5 TABLET, FILM COATED ORAL at 08:27

## 2024-02-10 RX ADMIN — CEFAZOLIN SODIUM 2000 MG: 2 SOLUTION INTRAVENOUS at 13:35

## 2024-02-10 RX ADMIN — Medication: at 08:28

## 2024-02-10 RX ADMIN — MAGNESIUM SULFATE HEPTAHYDRATE 2 G: 40 INJECTION, SOLUTION INTRAVENOUS at 08:26

## 2024-02-10 NOTE — CASE MANAGEMENT
Case Management Discharge Planning Note    Patient name Bhavna Al  Location [facility] 918/[facility] 918-01 MRN [id number]  : 1950 Date 2/10/2024       Current Admission Date: 2024  Current Admission Diagnosis:Septic shock (HCC)   Patient Active Problem List    Diagnosis Date Noted    Localized swelling on right hand 2024    Hematuria 2024    Encephalopathy 2024    Localized swelling on left hand 2023    Chronic kidney disease 2023    Febrile illness 2023    Dermatitis associated with incontinence 2023    Right shoulder pain 12/15/2022    Abnormal urinalysis 2022    Ambulatory dysfunction 2022    Hyperuricemia 2022    Chronic diastolic heart failure (HCC) 2022    Acute bronchitis 2022    Assistance needed with transportation 09/15/2022    Abnormal CT of the chest 2022    Gram-positive cocci bacteremia 2022    Pustular psoriasis 2022    Elevated troponin 2022    COVID-19 2022    Acute renal failure superimposed on stage 3 chronic kidney disease (HCC) 01/10/2022    Paroxysmal atrial fibrillation ([facility]) 01/10/2022    Septic shock ([facility]) 01/10/2022    Hyperparathyroidism ([facility]) 2021    Murmur, cardiac 2021    BPPV (benign paroxysmal positional vertigo) 2018    Seasonal allergic rhinitis due to pollen 2018    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    Osteoporosis 2016    SIRS (systemic inflammatory response syndrome) ([facility]) 2016    Rheumatoid arthritis ([facility]) 2016    GERD (gastroesophageal reflux disease) 2016    Sarcoid 2016    Morbid obesity ([facility]) 2016    Vitamin D deficiency 2015    Essential hypertension 2014    Lumbar radiculopathy 2014      LOS (days): 18  Geometric Mean LOS (GMLOS) (days): 5.1  Days to GMLOS:-13.2     OBJECTIVE:  Risk of Unplanned Readmission Score: 18.4         Current admission status: Inpatient   Preferred  Pharmacy:   [id number] - [place], [place] - [address]  [address]  [place] [address]  Phone: [phone] Fax: [phone]    [facility] - [place], [place] - [address]  [address] [facility] [address]  Phone: [phone] Fax: [phone]    Primary Care Provider: Nya Taveras DO    Primary Insurance: [facility]  Secondary Insurance: [facility]    DISCHARGE DETAILS:                                                                                              Family notified:: Call to daughter Rhoda, left message.  Additional Comments: Transport confirmed 3pm p/u  Liqu[facility]. Transport folder in pt floor chart, Rn notified viaTT. Transport time provided to facility via [facility] messaging.

## 2024-02-10 NOTE — CASE MANAGEMENT
Rec'd message from Uma @ [facility] requesting information on IV abx. Requesting fax with IV abx name, dosage, frequency & stop date. F: [phone]. Requested information faxed. CM notified

## 2024-02-10 NOTE — PLAN OF CARE
Problem: Prexisting or High Potential for Compromised Skin Integrity  Goal: Skin integrity is maintained or improved  Description: INTERVENTIONS:  - Identify patients at risk for skin breakdown  - Assess and monitor skin integrity  - Assess and monitor nutrition and hydration status  - Monitor labs   - Assess for incontinence   - Turn and reposition patient  - Assist with mobility/ambulation  - Relieve pressure over bony prominences  - Avoid friction and shearing  - Provide appropriate hygiene as needed including keeping skin clean and dry  - Evaluate need for skin moisturizer/barrier cream  - Collaborate with interdisciplinary team   - Patient/family teaching  - Consider wound care consult   Outcome: Progressing     Problem: PAIN - ADULT  Goal: Verbalizes/displays adequate comfort level or baseline comfort level  Description: Interventions:  - Encourage patient to monitor pain and request assistance  - Assess pain using appropriate pain scale  - Administer analgesics based on type and severity of pain and evaluate response  - Implement non-pharmacological measures as appropriate and evaluate response  - Consider cultural and social influences on pain and pain management  - Notify physician/advanced practitioner if interventions unsuccessful or patient reports new pain  Outcome: Progressing     Problem: Nutrition/Hydration-ADULT  Goal: Nutrient/Hydration intake appropriate for improving, restoring or maintaining nutritional needs  Description: Monitor and assess patient's nutrition/hydration status for malnutrition. Collaborate with interdisciplinary team and initiate plan and interventions as ordered.  Monitor patient's weight and dietary intake as ordered or per policy. Utilize nutrition screening tool and intervene as necessary. Determine patient's food preferences and provide high-protein, high-caloric foods as appropriate.     INTERVENTIONS:  - Monitor oral intake, urinary output, labs, and treatment plans  -  Assess nutrition and hydration status and recommend course of action  - Evaluate amount of meals eaten  - Assist patient with eating if necessary   - Allow adequate time for meals  - Recommend/ encourage appropriate diets, oral nutritional supplements, and vitamin/mineral supplements  - Order, calculate, and assess calorie counts as needed  - Recommend, monitor, and adjust tube feedings and TPN/PPN based on assessed needs  - Assess need for intravenous fluids  - Provide specific nutrition/hydration education as appropriate  - Include patient/family/caregiver in decisions related to nutrition  Outcome: Progressing

## 2024-02-12 ENCOUNTER — TRANSITIONAL CARE MANAGEMENT (OUTPATIENT)
Dept: INTERNAL MEDICINE CLINIC | Facility: CLINIC | Age: 74
End: 2024-02-12

## 2024-02-12 DIAGNOSIS — R78.81 GRAM-POSITIVE COCCI BACTEREMIA: Primary | ICD-10-CM

## 2024-02-12 NOTE — TELEPHONE ENCOUNTER
Called the patient at [phone], the number is no longer in service.    I called her daughter Rhoda at [phone] and left a message to call us at [phone] to schedule next available cysto appointment.

## 2024-02-12 NOTE — PROGRESS NOTES
OPAT NOTE    Supervising/Discharge physician: Tadeo    Diagnosis: MSSA / Staph Bacteremia    Drug: Cefazolin    Dose/Frequency: 2g Q8    Labs/Frequency: CBCD Cre weekly    End Date: 2/22    Infusion/VNA contact: [facility]    Next appointment: 2/19        MA assigned:  Naima

## 2024-02-12 NOTE — CASE MANAGEMENT
[facility] has received approved authorization from insurance: [facility]  Called in by Rep: Lanny BOUDREAUX#: [phone]  Authorization received for: SNF  Facility: [facility]   Authorization #: [id number]   Start of Care: 02/10  Next Review Date: 02/13  Continued Stay Care Coordinator: Batsheva BOUDREAUX#: [phone]  Submit next review to #: [phone]  Care Manager notified: Camilla Osorio

## 2024-02-12 NOTE — CASE MANAGEMENT
Case Management Discharge Planning Note    Patient name Bhavna Al  Location [facility] 918/[facility] 918-01 MRN [id number]  : 1950 Date 2024       Current Admission Date: 2024  Current Admission Diagnosis:Essential hypertension   Patient Active Problem List    Diagnosis Date Noted    Localized swelling on left hand 2023    Chronic kidney disease 2023    Febrile illness 2023    Dermatitis associated with incontinence 2023    Right shoulder pain 12/15/2022    Abnormal urinalysis 2022    Ambulatory dysfunction 2022    Hyperuricemia 2022    Chronic diastolic heart failure (HCC) 2022    Acute bronchitis 2022    Assistance needed with transportation 09/15/2022    Abnormal CT of the chest 2022    Gram-positive cocci bacteremia 2022    Pustular psoriasis 2022    Elevated troponin 2022    COVID-19 2022    Paroxysmal atrial fibrillation (HCC) 01/10/2022    Hyperparathyroidism ([facility]) 2021    Murmur, cardiac 2021    BPPV (benign paroxysmal positional vertigo) 2018    Seasonal allergic rhinitis due to pollen 2018    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    Osteoporosis 2016    SIRS (systemic inflammatory response syndrome) ([facility]) 2016    Rheumatoid arthritis ([facility]) 2016    GERD (gastroesophageal reflux disease) 2016    Sarcoid 2016    Morbid obesity ([facility]) 2016    Vitamin D deficiency 2015    Essential hypertension 2014    Lumbar radiculopathy 2014      LOS (days): 18  Geometric Mean LOS (GMLOS) (days): 5.1  Days to GMLOS:-13.4     OBJECTIVE:  Risk of Unplanned Readmission Score: 18.67         Current admission status: Inpatient   Preferred Pharmacy:   [id number] - [place], [place] - [address]  [address]  [place] [address]  Phone: [phone] Fax: [phone]    [facility] [address]  [address]  [address] [place] [address]  Phone: [phone] Fax: [phone]    Primary Care Provider: Nya Taveras DO    Primary Insurance: [facility]  Secondary Insurance: [facility]    DISCHARGE DETAILS:                                                                                                               Facility Insurance Auth Number: [id number]

## 2024-02-13 ENCOUNTER — NURSING HOME VISIT (OUTPATIENT)
Dept: WOUND CARE | Facility: HOSPITAL | Age: 74
End: 2024-02-13
Payer: COMMERCIAL

## 2024-02-13 DIAGNOSIS — T14.8XXA DEEP TISSUE INJURY: Primary | ICD-10-CM

## 2024-02-13 DIAGNOSIS — R26.2 AMBULATORY DYSFUNCTION: ICD-10-CM

## 2024-02-13 PROCEDURE — 99308 SBSQ NF CARE LOW MDM 20: CPT | Performed by: NURSE PRACTITIONER

## 2024-02-14 ENCOUNTER — TELEPHONE (OUTPATIENT)
Dept: INFECTIOUS DISEASES | Facility: CLINIC | Age: 74
End: 2024-02-14

## 2024-02-14 PROBLEM — T14.8XXA DEEP TISSUE INJURY: Status: ACTIVE | Noted: 2024-02-14

## 2024-02-14 NOTE — TELEPHONE ENCOUNTER
Contacted [facility].  Spoke with Niranjan.  Transferred to nursing department.  Patient on 3rd floor.  Spoke with Melinda to confirm receipt of fax with antibiotic plan, weekly labs, and follow up.   F: [phone]  Received per Melinda.  Confirmed all orders with me at this time.  They will call if questions.

## 2024-02-14 NOTE — ASSESSMENT & PLAN NOTE
Right ankle  Wound improved, partial thickness  Local wound care with hydraguard  Continue to offload  Follow up next week

## 2024-02-14 NOTE — PROGRESS NOTES
[facility] WOUND CARE MANAGEMENT   AND HYPERBARIC MEDICINE CENTER       Patient ID: Bhavna Al is a 73 y.o. female Date of Birth 1950     [facility] of [facility]    Performed wound round with: Wound team     Chief Complaint : Right ankle    Wound Instructions:  Wound:  Right ankle  Discontinue previous wound order  Cleanse the skin/wound bed with soap and water, pat dry  Apply hydraguard to wound bed/skin  Frequency : daily and prn for soiling  Offload with heelboot  Offload all wounds  Turn and reposition frequently,   Instruct / Assist with weight shifting in chair  Increase protein intake.  Monitor for any sign of infection or worsening, inform PCP or patient's primary physician in your facility.      Allergies  Shellfish-derived products - food allergy, Methotrexate derivatives, Amoxicillin, and Ampicillin-sulbactam sodium      Assessment & Plan:  1. Deep tissue injury  Assessment & Plan:  Right ankle  Wound improved, partial thickness  Local wound care with hydraguard  Continue to offload  Follow up next week      2. Ambulatory dysfunction  Assessment & Plan:  On 24/7 restorative care                 Subjective:   2/13/2024This is a 73 y.o., female referred to our service for wound/ skin alterations on right ankle.Patient have a complex medical history including but not limited to  GERD (gastroesophageal reflux disease), Hypertension. Patient was referred by Senior Care Team. Patient was seen in collaboration with the facility wound team.     Received patient in bed, seems comfortable.  Denies pain.  Patient is not oriented needs assistance with turning and repositioning in bed.  On protein supplement.  Staff reported DTI on the right ankle.         Review of Systems   Constitutional: Negative.    HENT: Negative.     Eyes: Negative.    Respiratory: Negative.     Cardiovascular:  Negative for chest pain and leg swelling.   Gastrointestinal: Negative.    Endocrine: Negative.    Genitourinary:  Negative.    Musculoskeletal:  Positive for gait problem.   Skin:  Positive for wound.        See HPI   Neurological:  Negative for dizziness and headaches.   Psychiatric/Behavioral:  Negative for behavioral problems.        Objective:    Physical Exam  Constitutional:       Appearance: Normal appearance.   Cardiovascular:      Rate and Rhythm: Normal rate.   Pulmonary:      Effort: Pulmonary effort is normal.   Musculoskeletal:      Comments: LROM   Skin:     Findings: Lesion present.      Comments: Right ankle: Wound size is 0.7 x 0.9 cm.,  100% epithelial, no drainage, no obvious sign of infection   Neurological:      Mental Status: She is alert.              Procedures           Patient's care was coordinated with nursing facility staff. Recent vitals, labs and updated medications were reviewed on EMR or chart system of facility. Past Medical, surgical, social, medication and allergy history and patient's previous records were reviewed and updated as appropriate: Most up-to date information is available in the facility EMR where the patient is currently admitted.    Patient Active Problem List   Diagnosis    SIRS (systemic inflammatory response syndrome) ([facility])    Rheumatoid arthritis (HCC)    GERD (gastroesophageal reflux disease)    Sarcoid    Essential hypertension    Morbid obesity ([facility])    Lumbar radiculopathy    Osteoporosis    Vitamin D deficiency    Staphylococcal scalded skin syndrome    BPPV (benign paroxysmal positional vertigo)    Seasonal allergic rhinitis due to pollen    Hyperparathyroidism ([facility])    Murmur, cardiac    Paroxysmal atrial fibrillation ([facility])    COVID-19    Elevated troponin    Gram-positive cocci bacteremia    Pustular psoriasis    Abnormal CT of the chest    Assistance needed with transportation    Acute bronchitis    Chronic diastolic heart failure ([facility])    Hyperuricemia    Ambulatory dysfunction    Abnormal urinalysis    Right shoulder pain    Dermatitis associated with incontinence  "   Febrile illness    Chronic kidney disease    Localized swelling on left hand    Deep tissue injury     Past Medical History:   Diagnosis Date    Abnormal thyroid function test     last assessed: 2015     Arthritis     Caries     last assessed: 2016     Continuous opioid dependence (HCC) 2021    Edema of right lower extremity     last assessed: 2015     GERD (gastroesophageal reflux disease)     Hypertension     Medicare annual wellness visit, subsequent 2021    Positive blood culture 3/11/2022    Sarcoid      Past Surgical History:   Procedure Laterality Date     SECTION      IR PICC PLACEMENT SINGLE LUMEN  2024    MULTIPLE TOOTH EXTRACTIONS N/A 2016    Procedure: Surgical extraction of teeth 2, 18, 19, 30, 31; incision and drainage of left subperiosteal abscess ;  Surgeon: Clara Cevallos DMD;  Location: [facility];  Service:      Social History     Socioeconomic History    Marital status: Single     Spouse name: None    Number of children: None    Years of education: None    Highest education level: None   Occupational History    Occupation: Retired: Worked for [employer] company    Tobacco Use    Smoking status: Never    Smokeless tobacco: Never   Vaping Use    Vaping status: Never Used   Substance and Sexual Activity    Alcohol use: Not Currently    Drug use: No    Sexual activity: Not Currently   Other Topics Concern    None   Social History Narrative     noted in \"allscripts\"      Social Determinants of Health     Financial Resource Strain: Not on file   Food Insecurity: No Food Insecurity (2024)    Hunger Vital Sign     Worried About Running Out of Food in the Last Year: Never true     Ran Out of Food in the Last Year: Never true   Transportation Needs: No Transportation Needs (2024)    PRAPARE - Transportation     Lack of Transportation (Medical): No     Lack of Transportation (Non-Medical): No   Physical Activity: Not on file "   Stress: Not on file   Social Connections: Not on file   Intimate Partner Violence: Not on file   Housing Stability: Low Risk  (1/24/2024)    Housing Stability Vital Sign     Unable to Pay for Housing in the Last Year: No     Number of Places Lived in the Last Year: 1     Unstable Housing in the Last Year: No        Current Outpatient Medications:     acetaminophen (TYLENOL) 325 mg tablet, Take 2 tablets (650 mg total) by mouth every 4 (four) hours as needed for mild pain, headaches or fever., Disp: 30 tablet, Rfl: 0    albuterol (Ventolin HFA) 90 mcg/act inhaler, Inhale 2 puffs every 6 (six) hours as needed for wheezing, Disp: 18 g, Rfl: 0    alendronate (FOSAMAX) 70 mg tablet, Take by mouth every 7 days , Disp: , Rfl:     apixaban (ELIQUIS) 5 mg, Take 1 tablet (5 mg total) by mouth 2 (two) times a day, Disp: 60 tablet, Rfl: 0    ceFAZolin (ANCEF) 2000 mg IVPB, Inject 2,000 mg into a catheter in a vein over 30 minutes at 100 mL/hr every 8 (eight) hours for 13 days, Disp: , Rfl:     cholecalciferol (VITAMIN D3) 1,000 units tablet, Take 1 tablet (1,000 Units total) by mouth daily, Disp: 90 tablet, Rfl: 0    clobetasol (TEMOVATE) 0.05 % cream, Apply topically 2 (two) times a day, Disp: 60 g, Rfl: 0    Clobetasol Propionate E 0.05 % emollient cream, APPLY TWICE A DAY FOR PALM SKIN DERMITITS, Disp: , Rfl:     furosemide (LASIX) 40 mg tablet, Take 1 tablet (40 mg total) by mouth daily, Disp: 30 tablet, Rfl: 0    gabapentin (Neurontin) 100 mg capsule, Take 1 capsule in AM and 3 capsules in PM, Disp: 120 capsule, Rfl: 1    hydrocortisone 2.5 % ointment, Apply topically 4 (four) times a day as needed for irritation, Disp: 30 g, Rfl: 0    hydroxychloroquine (PLAQUENIL) 200 mg tablet, Take 1 tablet (200 mg total) by mouth 2 (two) times a day, Disp: 60 tablet, Rfl: 0    ketoconazole (NIZORAL) 2 % cream, Apply topically 2 (two) times a day, Disp: 60 g, Rfl: 0    lidocaine (LIDODERM) 5 %, Apply 2 patches topically daily Remove  & Discard patch within 12 hours or as directed by MD Do not start before December 20, 2022., Disp: , Rfl: 0    metoprolol tartrate (LOPRESSOR) 25 mg tablet, Take 1 tablet (25 mg total) by mouth every 12 (twelve) hours, Disp: 180 tablet, Rfl: 0    nystatin (MYCOSTATIN) powder, Apply topically 2 (two) times a day, Disp: 15 g, Rfl: 0    omeprazole (PriLOSEC) 20 mg delayed release capsule, Take 1 capsule (20 mg total) by mouth daily, Disp: 90 capsule, Rfl: 3    polyethylene glycol (MIRALAX) 17 g packet, Take 17 g by mouth daily Do not start before November 26, 2022., Disp: 510 g, Rfl: 0    potassium chloride (Klor-Con M10) 10 mEq tablet, Take 2 tablets (20 mEq total) by mouth daily, Disp: , Rfl:     predniSONE 5 mg tablet, Take 1 tablet (5 mg total) by mouth 2 (two) times a day with meals Do not start before June 5, 2023., Disp: 60 tablet, Rfl: 0    senna-docusate sodium (SENOKOT S) 8.6-50 mg per tablet, Take 1 tablet by mouth daily, Disp: 30 tablet, Rfl: 0    white petrolatum-mineral oil (EUCERIN,HYDROCERIN) cream, Apply topically 3 (three) times a day, Disp: 454 g, Rfl: 0  Family History   Problem Relation Age of Onset    Asthma Mother     Stroke Mother     No Known Problems Father     No Known Problems Sister     No Known Problems Brother     No Known Problems Maternal Grandmother     No Known Problems Maternal Grandfather     No Known Problems Paternal Grandmother     No Known Problems Paternal Grandfather     Diabetes Maternal Aunt     Breast cancer Cousin     Diabetes Cousin     Breast cancer Other               Coordination of Care: Wound team aware of the treatment plan. Facility nurse will provide wound treatment and monitor the wound for any changes.     Patient / Staff education : Patient / Staff was given education on sign of infection and pressure ulcer prevention. Patient/ Staff verbalized understanding     Follow up :  Next week    Voice-recognition software may have been used in the preparation of this  "document. Occasional wrong word or \"sound-alike\" substitutions may have occurred due to the inherent limitations of voice recognition software. Interpretation should be guided by context.      AZIZA Darnell  "

## 2024-02-15 NOTE — TELEPHONE ENCOUNTER
Called pt daughter as pt number is not in service. Left a message to call us back and help us schedule the appt for her mom. CB# given.

## 2024-02-16 ENCOUNTER — TELEPHONE (OUTPATIENT)
Dept: DERMATOLOGY | Facility: CLINIC | Age: 74
End: 2024-02-16

## 2024-02-16 NOTE — TELEPHONE ENCOUNTER
Attempted to call patient to schedule a follow up appointment per Dr. Ponce however patient phone goes to a deadline with no voicemail. Did call patients daughter Rhoda and was able to leave a voicemail requesting that she call us back to get an appointment scheduled for her mother.

## 2024-02-19 ENCOUNTER — TELEPHONE (OUTPATIENT)
Dept: INFECTIOUS DISEASES | Facility: CLINIC | Age: 74
End: 2024-02-19

## 2024-02-19 ENCOUNTER — OFFICE VISIT (OUTPATIENT)
Dept: INFECTIOUS DISEASES | Facility: CLINIC | Age: 74
End: 2024-02-19
Payer: COMMERCIAL

## 2024-02-19 VITALS
SYSTOLIC BLOOD PRESSURE: 116 MMHG | TEMPERATURE: 97 F | RESPIRATION RATE: 18 BRPM | OXYGEN SATURATION: 94 % | BODY MASS INDEX: 44.12 KG/M2 | DIASTOLIC BLOOD PRESSURE: 78 MMHG | WEIGHT: 249 LBS | HEIGHT: 63 IN | HEART RATE: 73 BPM

## 2024-02-19 DIAGNOSIS — N18.30 STAGE 3 CHRONIC KIDNEY DISEASE, UNSPECIFIED WHETHER STAGE 3A OR 3B CKD (HCC): ICD-10-CM

## 2024-02-19 DIAGNOSIS — R78.81 GRAM-POSITIVE COCCI BACTEREMIA: Primary | ICD-10-CM

## 2024-02-19 DIAGNOSIS — L40.9 PSORIASIS: ICD-10-CM

## 2024-02-19 DIAGNOSIS — M06.9 RHEUMATOID ARTHRITIS INVOLVING MULTIPLE SITES, UNSPECIFIED WHETHER RHEUMATOID FACTOR PRESENT (HCC): Chronic | ICD-10-CM

## 2024-02-19 PROCEDURE — 99204 OFFICE O/P NEW MOD 45 MIN: CPT | Performed by: PHYSICIAN ASSISTANT

## 2024-02-19 RX ORDER — GINSENG 100 MG
1 CAPSULE ORAL DAILY
COMMUNITY

## 2024-02-19 RX ORDER — BISACODYL 10 MG
10 SUPPOSITORY, RECTAL RECTAL AS NEEDED
COMMUNITY

## 2024-02-19 NOTE — ASSESSMENT & PLAN NOTE
Was previously controlled on low-dose prednisone and hydroxychloroquine which had been held. Symptoms flared while hospitalized, though seem to have stabilized.  No edema, pain, or decreased ROM to the R wrist today.  -Steroids and Plaquenil per primary  -Outpatient follow up with rheumatology

## 2024-02-19 NOTE — ASSESSMENT & PLAN NOTE
Likely the source of #1. Not as active as in the past.  Has been evaluated by dermatology in the past.     Patient with no complaints of new rash, erythema, edema, discharge or tenderness anywhere on body.  -Outpatient dermatology follow-up  -Continue moisturizing area twice daily

## 2024-02-19 NOTE — ASSESSMENT & PLAN NOTE
Complicated by prerenal, septic induced AMANDA while hospitalized. Corey in place due to incontinence. Cr 1.1 on 2/14. GFR 48 on 2/14.  -Recheck serum Cr at least weekly as above while on cefazolin  -Volume management    Lab Results   Component Value Date    EGFR 65 02/09/2024    EGFR 62 02/08/2024    EGFR 55 02/06/2024    CREATININE 0.87 02/09/2024    CREATININE 0.90 02/08/2024    CREATININE 0.99 02/06/2024

## 2024-02-19 NOTE — TELEPHONE ENCOUNTER
Contacted [facility] 3rd floor nursing station.  They confirm patient's appt today, and will fax labs to us.

## 2024-02-19 NOTE — PROGRESS NOTES
Assessment/Plan:    Gram-positive cocci bacteremia  MSSA and Staphylococcus lugdunensis bacteremia on admission on 1/23.  Suspected cutaneous translocation in the setting of advanced psoriasis.  Consideration for the possibility of endovascular infection.  Consideration for the possibility of staph scalded skin syndrome.  Poor windows on transthoracic echocardiogram but no valvular vegetation reported.  Repeat blood cultures were negative on 1/25.    Patient with no acute complaints today and is feeling well. States that her skin is dry though she has been moisturizing. Tolerating the IV cefazolin well. No rash, N/V, diarrhea. Labs are stable.  -Continue cefazolin through 2/22/2024 to complete 4 weeks from the first negative blood culture  -Remove PICC line after last dose of IV antibiotics, on 2/23  -Check CBC with differential and creatinine weekly while on the cefazolin  -RTO as needed only    Psoriasis  Likely the source of #1. Not as active as in the past.  Has been evaluated by dermatology in the past.     Patient with no complaints of new rash, erythema, edema, discharge or tenderness anywhere on body.  -Outpatient dermatology follow-up  -Continue moisturizing area twice daily    Stage 3 chronic kidney disease (HCC)  Complicated by prerenal, septic induced AMANDA while hospitalized. Corey in place due to incontinence. Cr 1.1 on 2/14. GFR 48 on 2/14.  -Recheck serum Cr at least weekly as above while on cefazolin  -Volume management    Lab Results   Component Value Date    EGFR 65 02/09/2024    EGFR 62 02/08/2024    EGFR 55 02/06/2024    CREATININE 0.87 02/09/2024    CREATININE 0.90 02/08/2024    CREATININE 0.99 02/06/2024       Rheumatoid arthritis (HCC)  Was previously controlled on low-dose prednisone and hydroxychloroquine which had been held. Symptoms flared while hospitalized, though seem to have stabilized.  No edema, pain, or decreased ROM to the R wrist today.  -Steroids and Plaquenil per  primary  -Outpatient follow up with rheumatology       Diagnoses and all orders for this visit:    Gram-positive cocci bacteremia    Psoriasis    Stage 3 chronic kidney disease, unspecified whether stage 3a or 3b CKD (HCC)    Rheumatoid arthritis involving multiple sites, unspecified whether rheumatoid factor present (HCC)    Other orders  -     Ascorbic Acid 500 MG CAPS; Take 1 capsule by mouth in the morning  -     bisacodyl ([facility] Bisacodyl) 10 mg suppository; Insert 10 mg into the rectum if needed for constipation  -     Multiple Vitamins-Minerals (Multi Complete) CAPS; Take 1 capsule by mouth daily  -     Zinc 50 MG TABS; Take 1 tablet by mouth in the morning          Subjective:      Patient ID: Bhavna Al is a 73 y.o. female.    Bhavna is a 73 year old female with CKDIII, RA controlled on plaquenil and steroids, psoriasis, and possible SSSS who was admitted on 1/23 with MSSA and Staphylococcus lugdunensis bacteremia, likely secondary to skin breakdown/cellulitic process. Patient is doing well today. She has been more fatigued. She states that she is tolerating the IV cefazolin well. No rash, diarrhea, N/V, or fevers. Corey is in place for incontinence. Has been moisturizing skin twice daily to combat the dryness.  States that psoriasis is better controlled than in the past. No new lesions or areas of erythema or edema that she is aware of.         The following portions of the patient's history were reviewed and updated as appropriate: allergies, current medications, past family history, past medical history, past social history, past surgical history, and problem list.    Review of Systems   Constitutional:  Positive for fatigue. Negative for activity change, appetite change, chills, diaphoresis and fever.   Respiratory:  Negative for chest tightness and shortness of breath.    Cardiovascular:  Negative for chest pain.   Gastrointestinal:  Negative for abdominal distention, abdominal pain, constipation,  "diarrhea, nausea and vomiting.   Skin:  Negative for color change, pallor, rash and wound.   Neurological:  Negative for dizziness and headaches.   Psychiatric/Behavioral:  Negative for confusion.          Objective:      /78   Pulse 73   Temp (!) 97 °F (36.1 °C)   Resp 18   Ht 5' 3\" (1.6 m)   Wt 113 kg (249 lb)   LMP  (LMP Unknown)   SpO2 94%   BMI 44.11 kg/m²          Physical Exam  Constitutional:       General: She is not in acute distress.     Appearance: Normal appearance. She is not ill-appearing, toxic-appearing or diaphoretic.   HENT:      Head: Normocephalic and atraumatic.      Right Ear: External ear normal.      Left Ear: External ear normal.      Nose: Nose normal.      Mouth/Throat:      Mouth: Mucous membranes are moist.   Eyes:      General: No scleral icterus.        Right eye: No discharge.         Left eye: No discharge.      Extraocular Movements: Extraocular movements intact.      Conjunctiva/sclera: Conjunctivae normal.      Pupils: Pupils are equal, round, and reactive to light.   Cardiovascular:      Rate and Rhythm: Normal rate and regular rhythm.      Pulses: Normal pulses.      Heart sounds: Normal heart sounds. No murmur heard.     No friction rub. No gallop.   Pulmonary:      Effort: Pulmonary effort is normal. No respiratory distress.      Breath sounds: Normal breath sounds. No stridor. No wheezing, rhonchi or rales.   Chest:      Chest wall: No tenderness.   Abdominal:      General: Abdomen is flat. Bowel sounds are normal. There is no distension.      Palpations: Abdomen is soft. There is no mass.      Tenderness: There is no abdominal tenderness. There is no guarding.   Genitourinary:     Comments: Corey intact and draining clear, yellow urine.  Musculoskeletal:      Cervical back: Normal range of motion and neck supple. No tenderness.   Skin:     General: Skin is warm and dry.      Capillary Refill: Capillary refill takes less than 2 seconds.      Comments: " Dermatitis present throughout body. Aside from dryness no areas of erosion, fissures, erythema, edema, tenderness, or discharge.   Neurological:      General: No focal deficit present.      Mental Status: She is alert and oriented to person, place, and time.   Psychiatric:         Mood and Affect: Mood normal.         Behavior: Behavior normal.       Labs-  2/14  Hbg- 11.0  Plt- 153  WBC- 6.0  % neutrophils- 76.3%  Cr- 1.1  GFR- 48

## 2024-02-19 NOTE — PATIENT INSTRUCTIONS
Continue cefazolin 2g IV Q8 as ordered through 2/22.  CBCD Cre weekly until antibiotic course complete.  DC PICC 2/23/24  No ID follow up needed.

## 2024-02-19 NOTE — ASSESSMENT & PLAN NOTE
MSSA and Staphylococcus lugdunensis bacteremia on admission on 1/23.  Suspected cutaneous translocation in the setting of advanced psoriasis.  Consideration for the possibility of endovascular infection.  Consideration for the possibility of staph scalded skin syndrome.  Poor windows on transthoracic echocardiogram but no valvular vegetation reported.  Repeat blood cultures were negative on 1/25.    Patient with no acute complaints today and is feeling well. States that her skin is dry though she has been moisturizing. Tolerating the IV cefazolin well. No rash, N/V, diarrhea. Labs are stable.  -Continue IV cefazolin 2g q8 hours through 2/22/2024 to complete 4 weeks from the first negative blood culture  -Remove PICC line after last dose of IV antibiotics, on 2/23  -Check CBC with differential and creatinine weekly while on the cefazolin  -RTO as needed only

## 2024-02-20 ENCOUNTER — NURSING HOME VISIT (OUTPATIENT)
Dept: WOUND CARE | Facility: HOSPITAL | Age: 74
End: 2024-02-20
Payer: COMMERCIAL

## 2024-02-20 DIAGNOSIS — R32 DERMATITIS ASSOCIATED WITH INCONTINENCE: ICD-10-CM

## 2024-02-20 DIAGNOSIS — T14.8XXA DEEP TISSUE INJURY: Primary | ICD-10-CM

## 2024-02-20 DIAGNOSIS — L25.8 DERMATITIS ASSOCIATED WITH INCONTINENCE: ICD-10-CM

## 2024-02-20 PROCEDURE — 99307 SBSQ NF CARE SF MDM 10: CPT | Performed by: NURSE PRACTITIONER

## 2024-02-22 NOTE — ASSESSMENT & PLAN NOTE
Buttocks  Patient is incontinent of both bladder and bowel, use of diaper is not a contributing factor  The wound is healed  Hydraguard for moisture management  Sign off. Facility staff will continue to provide treatment and monitor the wound. Can reconsult wound nurse practitioner if wound failed to heal or worsened.

## 2024-02-22 NOTE — ASSESSMENT & PLAN NOTE
Right ankle  Wound is healed  Moisturizing lotion daily  Continue to offload  Sign off. Facility staff will continue to provide treatment and monitor the wound. Can reconsult wound nurse practitioner if wound failed to heal or worsened.

## 2024-02-22 NOTE — PROGRESS NOTES
[facility] WOUND CARE MANAGEMENT   AND HYPERBARIC MEDICINE CENTER       Patient ID: Bhavna Al is a 73 y.o. female Date of Birth 1950     Location of Service: [facility]    Performed wound round with: Wound team     Chief Complaint : Right ankle    Wound Instructions:  Wound:  Buttocks  Discontinue previous wound order  Cleanse the skin/wound bed with soap and water, pat dry  Apply hydraguard to wound bed/skin  Frequency : daily and prn for soiling    BLE  Moisturizing lotion daily    Offload with heelboot  Offload all wounds  Turn and reposition frequently,   Instruct / Assist with weight shifting in chair  Increase protein intake.  Monitor for any sign of infection or worsening, inform PCP or patient's primary physician in your facility.      Allergies  Shellfish-derived products - food allergy, Methotrexate derivatives, Amoxicillin, and Ampicillin-sulbactam sodium      Assessment & Plan:  1. Deep tissue injury  Assessment & Plan:  Right ankle  Wound is healed  Moisturizing lotion daily  Continue to offload  Sign off. Facility staff will continue to provide treatment and monitor the wound. Can reconsult wound nurse practitioner if wound failed to heal or worsened.         2. Dermatitis associated with incontinence  Assessment & Plan:  Buttocks  Patient is incontinent of both bladder and bowel, use of diaper is not a contributing factor  The wound is healed  Hydraguard for moisture management  Sign off. Facility staff will continue to provide treatment and monitor the wound. Can reconsult wound nurse practitioner if wound failed to heal or worsened.                      Subjective:   2/13/2024This is a 73 y.o., female referred to our service for wound/ skin alterations on right ankle.Patient have a complex medical history including but not limited to  GERD (gastroesophageal reflux disease), Hypertension. Patient was referred by Senior Care Team. Patient was seen in collaboration with the facility wound  team.     Received patient in bed, seems comfortable.  Denies pain.  Patient is not oriented needs assistance with turning and repositioning in bed.  On protein supplement.  Staff reported DTI on the right ankle.     2/20/2024 Follow up for wound on the buttocks and right ankle . Received patient, not in distress. Facility staff did not report any significant issues related to the wound. Denies pain.           Review of Systems   Constitutional: Negative.    HENT: Negative.     Eyes: Negative.    Respiratory: Negative.     Cardiovascular:  Negative for chest pain and leg swelling.   Gastrointestinal: Negative.    Endocrine: Negative.    Genitourinary: Negative.    Musculoskeletal:  Positive for gait problem.   Skin:  Positive for wound.        See HPI   Neurological:  Negative for dizziness and headaches.   Psychiatric/Behavioral:  Negative for behavioral problems.        Objective:    Physical Exam  Constitutional:       Appearance: Normal appearance.   Cardiovascular:      Rate and Rhythm: Normal rate.   Pulmonary:      Effort: Pulmonary effort is normal.   Musculoskeletal:      Comments: LROM   Skin:     Findings: No lesion.      Comments: Right ankle: wound is healed    Buttocks : skin is dry   Neurological:      Mental Status: She is alert.              Procedures           Patient's care was coordinated with nursing facility staff. Recent vitals, labs and updated medications were reviewed on EMR or chart system of facility. Past Medical, surgical, social, medication and allergy history and patient's previous records were reviewed and updated as appropriate: Most up-to date information is available in the facility EMR where the patient is currently admitted.    Patient Active Problem List   Diagnosis    SIRS (systemic inflammatory response syndrome) (HCC)    Rheumatoid arthritis (HCC)    GERD (gastroesophageal reflux disease)    Sarcoid    Essential hypertension    Morbid obesity (HCC)    Lumbar radiculopathy     Osteoporosis    Vitamin D deficiency    Staphylococcal scalded skin syndrome    BPPV (benign paroxysmal positional vertigo)    Seasonal allergic rhinitis due to pollen    Hyperparathyroidism (HCC)    Murmur, cardiac    Paroxysmal atrial fibrillation (HCC)    COVID-19    Elevated troponin    Gram-positive cocci bacteremia    Psoriasis    Abnormal CT of the chest    Assistance needed with transportation    Acute bronchitis    Chronic diastolic heart failure (HCC)    Hyperuricemia    Ambulatory dysfunction    Abnormal urinalysis    Right shoulder pain    Dermatitis associated with incontinence    Febrile illness    Stage 3 chronic kidney disease (HCC)    Localized swelling on left hand    Deep tissue injury     Past Medical History:   Diagnosis Date    Abnormal thyroid function test     last assessed: 2015     Arthritis     Caries     last assessed: 2016     Continuous opioid dependence (HCC) 2021    Edema of right lower extremity     last assessed: 2015     GERD (gastroesophageal reflux disease)     Hypertension     Medicare annual wellness visit, subsequent 2021    Positive blood culture 3/11/2022    Sarcoid      Past Surgical History:   Procedure Laterality Date     SECTION      IR PICC PLACEMENT SINGLE LUMEN  2024    MULTIPLE TOOTH EXTRACTIONS N/A 2016    Procedure: Surgical extraction of teeth 2, 18, 19, 30, 31; incision and drainage of left subperiosteal abscess ;  Surgeon: Clara Cevallos DMD;  Location: [facility];  Service:      Social History     Socioeconomic History    Marital status: Single     Spouse name: None    Number of children: None    Years of education: None    Highest education level: None   Occupational History    Occupation: Retired: Worked for telephone company    Tobacco Use    Smoking status: Never    Smokeless tobacco: Never   Vaping Use    Vaping status: Never Used   Substance and Sexual Activity    Alcohol use: Not Currently     "Drug use: No    Sexual activity: Not Currently   Other Topics Concern    None   Social History Narrative     noted in \"allscripts\"      Social Determinants of Health     Financial Resource Strain: Not on file   Food Insecurity: No Food Insecurity (1/24/2024)    Hunger Vital Sign     Worried About Running Out of Food in the Last Year: Never true     Ran Out of Food in the Last Year: Never true   Transportation Needs: No Transportation Needs (1/24/2024)    PRAPARE - Transportation     Lack of Transportation (Medical): No     Lack of Transportation (Non-Medical): No   Physical Activity: Not on file   Stress: Not on file   Social Connections: Not on file   Intimate Partner Violence: Not on file   Housing Stability: Low Risk  (1/24/2024)    Housing Stability Vital Sign     Unable to Pay for Housing in the Last Year: No     Number of Places Lived in the Last Year: 1     Unstable Housing in the Last Year: No        Current Outpatient Medications:     acetaminophen (TYLENOL) 325 mg tablet, Take 2 tablets (650 mg total) by mouth every 4 (four) hours as needed for mild pain, headaches or fever., Disp: 30 tablet, Rfl: 0    albuterol (Ventolin HFA) 90 mcg/act inhaler, Inhale 2 puffs every 6 (six) hours as needed for wheezing, Disp: 18 g, Rfl: 0    alendronate (FOSAMAX) 70 mg tablet, Take by mouth every 7 days , Disp: , Rfl:     apixaban (ELIQUIS) 5 mg, Take 1 tablet (5 mg total) by mouth 2 (two) times a day, Disp: 60 tablet, Rfl: 0    Ascorbic Acid 500 MG CAPS, Take 1 capsule by mouth in the morning, Disp: , Rfl:     bisacodyl ([facility] Bisacodyl) 10 mg suppository, Insert 10 mg into the rectum if needed for constipation, Disp: , Rfl:     ceFAZolin (ANCEF) 2000 mg IVPB, Inject 2,000 mg into a catheter in a vein over 30 minutes at 100 mL/hr every 8 (eight) hours for 13 days, Disp: , Rfl:     cholecalciferol (VITAMIN D3) 1,000 units tablet, Take 1 tablet (1,000 Units total) by mouth daily, Disp: 90 tablet, Rfl: 0    clobetasol " (TEMOVATE) 0.05 % cream, Apply topically 2 (two) times a day (Patient not taking: Reported on 2/19/2024), Disp: 60 g, Rfl: 0    Clobetasol Propionate E 0.05 % emollient cream, APPLY TWICE A DAY FOR PALM SKIN DERMITITS, Disp: , Rfl:     furosemide (LASIX) 40 mg tablet, Take 1 tablet (40 mg total) by mouth daily, Disp: 30 tablet, Rfl: 0    gabapentin (Neurontin) 100 mg capsule, Take 1 capsule in AM and 3 capsules in PM, Disp: 120 capsule, Rfl: 1    hydrocortisone 2.5 % ointment, Apply topically 4 (four) times a day as needed for irritation, Disp: 30 g, Rfl: 0    hydroxychloroquine (PLAQUENIL) 200 mg tablet, Take 1 tablet (200 mg total) by mouth 2 (two) times a day, Disp: 60 tablet, Rfl: 0    ketoconazole (NIZORAL) 2 % cream, Apply topically 2 (two) times a day, Disp: 60 g, Rfl: 0    lidocaine (LIDODERM) 5 %, Apply 2 patches topically daily Remove & Discard patch within 12 hours or as directed by MD Do not start before December 20, 2022., Disp: , Rfl: 0    metoprolol tartrate (LOPRESSOR) 25 mg tablet, Take 1 tablet (25 mg total) by mouth every 12 (twelve) hours, Disp: 180 tablet, Rfl: 0    Multiple Vitamins-Minerals (Multi Complete) CAPS, Take 1 capsule by mouth daily, Disp: , Rfl:     nystatin (MYCOSTATIN) powder, Apply topically 2 (two) times a day, Disp: 15 g, Rfl: 0    omeprazole (PriLOSEC) 20 mg delayed release capsule, Take 1 capsule (20 mg total) by mouth daily, Disp: 90 capsule, Rfl: 3    polyethylene glycol (MIRALAX) 17 g packet, Take 17 g by mouth daily Do not start before November 26, 2022. (Patient not taking: Reported on 2/19/2024), Disp: 510 g, Rfl: 0    potassium chloride (Klor-Con M10) 10 mEq tablet, Take 2 tablets (20 mEq total) by mouth daily, Disp: , Rfl:     predniSONE 5 mg tablet, Take 1 tablet (5 mg total) by mouth 2 (two) times a day with meals Do not start before June 5, 2023., Disp: 60 tablet, Rfl: 0    senna-docusate sodium (SENOKOT S) 8.6-50 mg per tablet, Take 1 tablet by mouth daily, Disp:  "30 tablet, Rfl: 0    white petrolatum-mineral oil (EUCERIN,HYDROCERIN) cream, Apply topically 3 (three) times a day (Patient not taking: Reported on 2/19/2024), Disp: 454 g, Rfl: 0    Zinc 50 MG TABS, Take 1 tablet by mouth in the morning, Disp: , Rfl:   Family History   Problem Relation Age of Onset    Asthma Mother     Stroke Mother     No Known Problems Father     No Known Problems Sister     No Known Problems Brother     No Known Problems Maternal Grandmother     No Known Problems Maternal Grandfather     No Known Problems Paternal Grandmother     No Known Problems Paternal Grandfather     Diabetes Maternal Aunt     Breast cancer Cousin     Diabetes Cousin     Breast cancer Other               Coordination of Care: Wound team aware of the treatment plan. Facility nurse will provide wound treatment and monitor the wound for any changes.     Patient / Staff education : Patient / Staff was given education on sign of infection and pressure ulcer prevention. Patient/ Staff verbalized understanding     Follow up :  Sign off    Voice-recognition software may have been used in the preparation of this document. Occasional wrong word or \"sound-alike\" substitutions may have occurred due to the inherent limitations of voice recognition software. Interpretation should be guided by context.      AZIZA Darnell  "

## 2024-04-11 ENCOUNTER — OFFICE VISIT (OUTPATIENT)
Dept: RHEUMATOLOGY | Facility: CLINIC | Age: 74
End: 2024-04-11
Payer: COMMERCIAL

## 2024-04-11 VITALS
OXYGEN SATURATION: 99 % | SYSTOLIC BLOOD PRESSURE: 114 MMHG | HEART RATE: 64 BPM | BODY MASS INDEX: 44.11 KG/M2 | HEIGHT: 63 IN | DIASTOLIC BLOOD PRESSURE: 74 MMHG

## 2024-04-11 DIAGNOSIS — M06.09 RHEUMATOID ARTHRITIS OF MULTIPLE SITES WITH NEGATIVE RHEUMATOID FACTOR (HCC): Primary | ICD-10-CM

## 2024-04-11 PROCEDURE — 99214 OFFICE O/P EST MOD 30 MIN: CPT | Performed by: INTERNAL MEDICINE

## 2024-04-11 RX ORDER — TRIAMCINOLONE ACETONIDE 1 MG/G
CREAM TOPICAL 2 TIMES DAILY
COMMUNITY

## 2024-04-11 NOTE — PROGRESS NOTES
HPI: Bhavna Al is a 74 y/o female who presents for further f/u rheumatoid arthritis. Doing well  BID and prednisone 5-5 (on chronic prednisone > 20 years per Dr Lucas)    She has past medical history GERD, HTN, RA    She is transitioning from outside rheumatologist Dr. Lucas    H/o seropositive RA, possible psoriasis, OA knees, DDD, radiculopathy    She is doing well on  BID and prednisone (5-5) . She mentions she has been on chronic prednisone > 20 years    Scaly dermatitis noted per Dr Lucas (intermittent). cosentyx was considered due to possible PsA but at this time she is doing well on HCQ     Was on Humira in the past but she does not wish to resume as doing well.                   --------------------------------------------------------------------------------------------------------        ROS:      All other ROS was reviewed and negative except as above         --------------------------------------------------------------------------------------------------------    Past Medical History    Past Medical History:   Diagnosis Date    Abnormal thyroid function test     last assessed: 2015     Arthritis     Caries     last assessed: 2016     Continuous opioid dependence (HCC) 2021    Edema of right lower extremity     last assessed: 2015     GERD (gastroesophageal reflux disease)     Hypertension     Medicare annual wellness visit, subsequent 2021    Positive blood culture 3/11/2022    Sarcoid            Past Surgical History    Past Surgical History:   Procedure Laterality Date     SECTION      IR PICC PLACEMENT SINGLE LUMEN  2024    MULTIPLE TOOTH EXTRACTIONS N/A 2016    Procedure: Surgical extraction of teeth 2, 18, 19, 30, 31; incision and drainage of left subperiosteal abscess ;  Surgeon: Clara Cevallos DMD;  Location: [facility];  Service:            Family History    Family History   Problem Relation Age of Onset     Asthma Mother     Stroke Mother     No Known Problems Father     No Known Problems Sister     No Known Problems Brother     No Known Problems Maternal Grandmother     No Known Problems Maternal Grandfather     No Known Problems Paternal Grandmother     No Known Problems Paternal Grandfather     Diabetes Maternal Aunt     Breast cancer Cousin     Diabetes Cousin     Breast cancer Other             Social History    Social History     Tobacco Use    Smoking status: Never    Smokeless tobacco: Never   Vaping Use    Vaping status: Never Used   Substance Use Topics    Alcohol use: Not Currently    Drug use: No         Allergies    Allergies   Allergen Reactions    Shellfish-Derived Products - Food Allergy Itching    Methotrexate Derivatives     Amoxicillin Rash    Ampicillin-Sulbactam Sodium Rash         Medications    Current Outpatient Medications   Medication Instructions    acetaminophen (TYLENOL) 650 mg, Oral, Every 4 hours PRN    albuterol (Ventolin HFA) 90 mcg/act inhaler 2 puffs, Inhalation, Every 6 hours PRN    alendronate (FOSAMAX) 70 mg tablet Oral, Every 7 days    apixaban (ELIQUIS) 5 mg, Oral, 2 times daily    Ascorbic Acid 500 MG CAPS 1 capsule, Daily    bisacodyl ([employer] BISACODYL) 10 mg, Rectal, As needed, Insert 1 suppository rectally as needed for constipation. Give on Day 5 of no bowel movement if Milk of Magnesia not effective     cholecalciferol 1,000 Units, Oral, Daily    clobetasol (TEMOVATE) 0.05 % cream Topical, 2 times daily    Clobetasol Propionate E 0.05 % emollient cream APPLY TWICE A DAY FOR PALM SKIN DERMITITS    furosemide (LASIX) 40 mg, Oral, Daily    gabapentin (Neurontin) 100 mg capsule Take 1 capsule in AM and 3 capsules in PM    hydrocortisone 2.5 % ointment Topical, 4 times daily PRN    hydroxychloroquine (PLAQUENIL) 200 mg, Oral, 2 times daily    ketoconazole (NIZORAL) 2 % cream Topical, 2 times daily    lidocaine (LIDODERM) 5 % 2 patches, Topical, Daily, Remove & Discard patch within  "12 hours or as directed by MD    magnesium hydroxide (MILK OF MAGNESIA) 400 mg/5 mL oral suspension 30 mL, Oral, Daily PRN, Give 30 ml by mouth as needed for constipation.Give milk and magnesia if now bowel movement in 3 days (72 hrs)     metoprolol tartrate (LOPRESSOR) 25 mg, Oral, Every 12 hours scheduled    Multiple Vitamins-Minerals (Multi Complete) CAPS 1 capsule, Oral, Daily    nystatin (MYCOSTATIN) powder Topical, 2 times daily    omeprazole (PRILOSEC) 20 mg, Oral, Daily    polyethylene glycol (MIRALAX) 17 g, Oral, Daily    potassium chloride (Klor-Con M10) 10 mEq tablet 20 mEq, Oral, Daily    predniSONE 5 mg, Oral, 2 times daily with meals    senna-docusate sodium (SENOKOT S) 8.6-50 mg per tablet 1 tablet, Oral, Daily    Sodium Phosphates (ENEMA RE) Rectal, Insert 1 applicator rectally as needed for constipation. Give on Day 6 of no bowel movement if Milk of Magnesia and Bisacodyl suppository not effective     triamcinolone (KENALOG) 0.1 % cream Topical, 2 times daily, Apply to BUE and BLE topically everyday and evening shift for psoriasis     white petrolatum-mineral oil (EUCERIN,HYDROCERIN) cream Topical, 3 times daily    Zinc 50 MG TABS 1 tablet, Daily          Physical Exam    /74   Pulse 64   Ht 5' 3\" (1.6 m)   LMP  (LMP Unknown)   SpO2 99%   BMI 44.11 kg/m²     GEN: AAO, No apparent distress.  Patient is well developed.  HEENT:  Pupils are equal, round and reactive.  Sclera are clear.  Fundoscopic exam is normal.  External ears are without lesions.  Oral pharynx is clear of ulcers or other lesions.  MMM.   NECK:  Supple.  There is no adenopathy appreciable in anterior or posterior cervical chains or supraclavicularly.  JVP is normal.    HEART: Regular rate and rhythm.  There is no appreciable murmur, gallop or rub.  LUNGS: Clear to auscultation.  ABD:  Soft, without tenderness, rebound or guarding.  No appreciable organomegally.  NEURO: Speech and cognition are normal.  Strength is 5/5 " throughout.  Tone is normal.  DTRs are 2/4 at the knees, ankles and elbows.  Gait is normal.  SKIN: There are no rashes or lesions    MUSCULOSKELETAL:   No synovitis       ________________________________________________________________________          Results Review    Component      Latest Ref Rng 5/30/2023 5/31/2023   Sodium      135 - 147 mmol/L 134 (L)  138    Potassium      3.5 - 5.3 mmol/L 4.2  3.9    Chloride      96 - 108 mmol/L 103  107    CO2      21 - 32 mmol/L 29  28    Anion Gap      4 - 13 mmol/L 2 (L)  3 (L)    BUN      5 - 25 mg/dL 29 (H)  34 (H)    Creatinine      0.60 - 1.30 mg/dL 1.63 (H)  1.46 (H)    Glucose, Random      65 - 140 mg/dL 82  82    Calcium      8.3 - 10.1 mg/dL 9.1  9.0    CORRECTED CALCIUM      8.3 - 10.1 mg/dL 10.5 (H)     AST      5 - 45 U/L 43     ALT      12 - 78 U/L 42     Alkaline Phosphatase      46 - 116 U/L 63     Total Protein      6.4 - 8.4 g/dL 6.8     Albumin      3.5 - 5.0 g/dL 2.2 (L)     TOTAL BILIRUBIN      0.20 - 1.00 mg/dL 0.44     eGFR      ml/min/1.73sq m 30  35    WBC      4.31 - 10.16 Thousand/uL  9.00    Red Blood Cell Count      3.81 - 5.12 Million/uL  3.52 (L)    Hemoglobin      11.5 - 15.4 g/dL  9.9 (L)    HCT      34.8 - 46.1 %  33.0 (L)    MCV      82 - 98 fL  94    MCH      26.8 - 34.3 pg  28.1    MCHC      31.4 - 37.4 g/dL  30.0 (L)    RDW      11.6 - 15.1 %  13.4    Platelet Count      149 - 390 Thousands/uL  219    MPV      8.9 - 12.7 fL  11.1       Legend:  (L) Low  (H) High        Impressions and Plans:    Bhavna Al is a 72 y/o female with chronic low back pain, knee OA, rheumatoid arthritis, possible psoriasis  Knee OA: f/u Orthopedics  Seropositive RA: Doing well on  BID and chronic prednisone 5 mg BID  Declines biologics  Med monitoring: labs reviewed    RTC 6 months    Thank you for involving me in this patient's care.        Dakota Padilla MD  Department of Rheumatology  [facility]

## 2024-06-06 ENCOUNTER — APPOINTMENT (INPATIENT)
Dept: RADIOLOGY | Facility: HOSPITAL | Age: 74
DRG: 539 | End: 2024-06-06
Payer: MEDICARE

## 2024-06-06 ENCOUNTER — HOSPITAL ENCOUNTER (INPATIENT)
Facility: HOSPITAL | Age: 74
LOS: 12 days | Discharge: NURSING, REHAB OR LONG-TERM CARE | DRG: 539 | End: 2024-06-18
Attending: EMERGENCY MEDICINE | Admitting: HOSPITALIST
Payer: MEDICARE

## 2024-06-06 DIAGNOSIS — R78.81 MRSA BACTEREMIA: ICD-10-CM

## 2024-06-06 DIAGNOSIS — R78.81 BACTEREMIA: ICD-10-CM

## 2024-06-06 DIAGNOSIS — K21.9 GASTROESOPHAGEAL REFLUX DISEASE WITHOUT ESOPHAGITIS: Chronic | ICD-10-CM

## 2024-06-06 DIAGNOSIS — M46.24 ACUTE OSTEOMYELITIS OF THORACIC SPINE (HCC): ICD-10-CM

## 2024-06-06 DIAGNOSIS — R53.83 LETHARGY: ICD-10-CM

## 2024-06-06 DIAGNOSIS — R53.1 WEAKNESS: ICD-10-CM

## 2024-06-06 DIAGNOSIS — B95.62 MRSA BACTEREMIA: ICD-10-CM

## 2024-06-06 DIAGNOSIS — B37.9 CANDIDIASIS: ICD-10-CM

## 2024-06-06 DIAGNOSIS — E27.40 ADRENAL INSUFFICIENCY (HCC): ICD-10-CM

## 2024-06-06 DIAGNOSIS — L25.8 DERMATITIS ASSOCIATED WITH INCONTINENCE: ICD-10-CM

## 2024-06-06 DIAGNOSIS — K59.00 CONSTIPATION: ICD-10-CM

## 2024-06-06 DIAGNOSIS — N17.9 ACUTE KIDNEY INJURY (HCC): Primary | ICD-10-CM

## 2024-06-06 DIAGNOSIS — M46.40 DISCITIS: ICD-10-CM

## 2024-06-06 DIAGNOSIS — L00 STAPHYLOCOCCAL SCALDED SKIN SYNDROME: ICD-10-CM

## 2024-06-06 DIAGNOSIS — R32 DERMATITIS ASSOCIATED WITH INCONTINENCE: ICD-10-CM

## 2024-06-06 PROBLEM — N18.9 ACUTE KIDNEY INJURY SUPERIMPOSED ON CKD  (HCC): Status: ACTIVE | Noted: 2022-01-10

## 2024-06-06 LAB
ALBUMIN SERPL BCP-MCNC: 3.2 G/DL (ref 3.5–5)
ALP SERPL-CCNC: 53 U/L (ref 34–104)
ALT SERPL W P-5'-P-CCNC: 17 U/L (ref 7–52)
ANION GAP SERPL CALCULATED.3IONS-SCNC: 15 MMOL/L (ref 4–13)
AST SERPL W P-5'-P-CCNC: 34 U/L (ref 13–39)
ATRIAL RATE: 300 BPM
BACTERIA UR QL AUTO: ABNORMAL /HPF
BASOPHILS # BLD AUTO: 0.02 THOUSANDS/ÂΜL (ref 0–0.1)
BASOPHILS NFR BLD AUTO: 0 % (ref 0–1)
BILIRUB SERPL-MCNC: 0.61 MG/DL (ref 0.2–1)
BILIRUB UR QL STRIP: NEGATIVE
BUN SERPL-MCNC: 27 MG/DL (ref 5–25)
CALCIUM ALBUM COR SERPL-MCNC: 10.4 MG/DL (ref 8.3–10.1)
CALCIUM SERPL-MCNC: 9.8 MG/DL (ref 8.4–10.2)
CHLORIDE SERPL-SCNC: 101 MMOL/L (ref 96–108)
CLARITY UR: CLEAR
CO2 SERPL-SCNC: 23 MMOL/L (ref 21–32)
COLOR UR: YELLOW
CREAT SERPL-MCNC: 1.75 MG/DL (ref 0.6–1.3)
EOSINOPHIL # BLD AUTO: 0.15 THOUSAND/ÂΜL (ref 0–0.61)
EOSINOPHIL NFR BLD AUTO: 2 % (ref 0–6)
ERYTHROCYTE [DISTWIDTH] IN BLOOD BY AUTOMATED COUNT: 14.9 % (ref 11.6–15.1)
FLUAV RNA RESP QL NAA+PROBE: NEGATIVE
FLUBV RNA RESP QL NAA+PROBE: NEGATIVE
GFR SERPL CREATININE-BSD FRML MDRD: 28 ML/MIN/1.73SQ M
GLUCOSE SERPL-MCNC: 105 MG/DL (ref 65–140)
GLUCOSE UR STRIP-MCNC: NEGATIVE MG/DL
HCT VFR BLD AUTO: 51.2 % (ref 34.8–46.1)
HGB BLD-MCNC: 15.7 G/DL (ref 11.5–15.4)
HGB UR QL STRIP.AUTO: NEGATIVE
HYALINE CASTS #/AREA URNS LPF: ABNORMAL /LPF
IMM GRANULOCYTES # BLD AUTO: 0.04 THOUSAND/UL (ref 0–0.2)
IMM GRANULOCYTES NFR BLD AUTO: 1 % (ref 0–2)
KETONES UR STRIP-MCNC: NEGATIVE MG/DL
LACTATE SERPL-SCNC: 4.2 MMOL/L (ref 0.5–2)
LEUKOCYTE ESTERASE UR QL STRIP: ABNORMAL
LIPASE SERPL-CCNC: 15 U/L (ref 11–82)
LYMPHOCYTES # BLD AUTO: 1.19 THOUSANDS/ÂΜL (ref 0.6–4.47)
LYMPHOCYTES NFR BLD AUTO: 18 % (ref 14–44)
MCH RBC QN AUTO: 28.1 PG (ref 26.8–34.3)
MCHC RBC AUTO-ENTMCNC: 30.7 G/DL (ref 31.4–37.4)
MCV RBC AUTO: 92 FL (ref 82–98)
MONOCYTES # BLD AUTO: 0.31 THOUSAND/ÂΜL (ref 0.17–1.22)
MONOCYTES NFR BLD AUTO: 5 % (ref 4–12)
NEUTROPHILS # BLD AUTO: 5.06 THOUSANDS/ÂΜL (ref 1.85–7.62)
NEUTS SEG NFR BLD AUTO: 74 % (ref 43–75)
NITRITE UR QL STRIP: POSITIVE
NON-SQ EPI CELLS URNS QL MICRO: ABNORMAL /HPF
NRBC BLD AUTO-RTO: 0 /100 WBCS
PH UR STRIP.AUTO: 5 [PH]
PLATELET # BLD AUTO: 225 THOUSANDS/UL (ref 149–390)
PMV BLD AUTO: 10.7 FL (ref 8.9–12.7)
POTASSIUM SERPL-SCNC: 4.5 MMOL/L (ref 3.5–5.3)
PROCALCITONIN SERPL-MCNC: 0.2 NG/ML
PROT SERPL-MCNC: 7.3 G/DL (ref 6.4–8.4)
PROT UR STRIP-MCNC: ABNORMAL MG/DL
QRS AXIS: -4 DEGREES
QRSD INTERVAL: 92 MS
QT INTERVAL: 328 MS
QTC INTERVAL: 459 MS
RBC # BLD AUTO: 5.59 MILLION/UL (ref 3.81–5.12)
RBC #/AREA URNS AUTO: ABNORMAL /HPF
RSV RNA RESP QL NAA+PROBE: NEGATIVE
SARS-COV-2 RNA RESP QL NAA+PROBE: NEGATIVE
SODIUM SERPL-SCNC: 139 MMOL/L (ref 135–147)
SP GR UR STRIP.AUTO: 1.01 (ref 1–1.03)
T WAVE AXIS: 118 DEGREES
UROBILINOGEN UR STRIP-ACNC: <2 MG/DL
VENTRICULAR RATE: 118 BPM
WBC # BLD AUTO: 6.77 THOUSAND/UL (ref 4.31–10.16)
WBC #/AREA URNS AUTO: ABNORMAL /HPF

## 2024-06-06 PROCEDURE — 70450 CT HEAD/BRAIN W/O DYE: CPT

## 2024-06-06 PROCEDURE — 99285 EMERGENCY DEPT VISIT HI MDM: CPT

## 2024-06-06 PROCEDURE — 83690 ASSAY OF LIPASE: CPT

## 2024-06-06 PROCEDURE — 36415 COLL VENOUS BLD VENIPUNCTURE: CPT

## 2024-06-06 PROCEDURE — 81001 URINALYSIS AUTO W/SCOPE: CPT | Performed by: STUDENT IN AN ORGANIZED HEALTH CARE EDUCATION/TRAINING PROGRAM

## 2024-06-06 PROCEDURE — NC001 PR NO CHARGE: Performed by: RADIOLOGY

## 2024-06-06 PROCEDURE — 87040 BLOOD CULTURE FOR BACTERIA: CPT

## 2024-06-06 PROCEDURE — 87154 CUL TYP ID BLD PTHGN 6+ TRGT: CPT

## 2024-06-06 PROCEDURE — 87186 SC STD MICRODIL/AGAR DIL: CPT | Performed by: STUDENT IN AN ORGANIZED HEALTH CARE EDUCATION/TRAINING PROGRAM

## 2024-06-06 PROCEDURE — 87086 URINE CULTURE/COLONY COUNT: CPT | Performed by: STUDENT IN AN ORGANIZED HEALTH CARE EDUCATION/TRAINING PROGRAM

## 2024-06-06 PROCEDURE — 74176 CT ABD & PELVIS W/O CONTRAST: CPT

## 2024-06-06 PROCEDURE — 99223 1ST HOSP IP/OBS HIGH 75: CPT | Performed by: INTERNAL MEDICINE

## 2024-06-06 PROCEDURE — 93005 ELECTROCARDIOGRAM TRACING: CPT

## 2024-06-06 PROCEDURE — 83605 ASSAY OF LACTIC ACID: CPT

## 2024-06-06 PROCEDURE — 87186 SC STD MICRODIL/AGAR DIL: CPT

## 2024-06-06 PROCEDURE — 85025 COMPLETE CBC W/AUTO DIFF WBC: CPT

## 2024-06-06 PROCEDURE — 93010 ELECTROCARDIOGRAM REPORT: CPT | Performed by: INTERNAL MEDICINE

## 2024-06-06 PROCEDURE — 71250 CT THORAX DX C-: CPT

## 2024-06-06 PROCEDURE — 80053 COMPREHEN METABOLIC PANEL: CPT

## 2024-06-06 PROCEDURE — 0241U HB NFCT DS VIR RESP RNA 4 TRGT: CPT

## 2024-06-06 PROCEDURE — 87077 CULTURE AEROBIC IDENTIFY: CPT | Performed by: STUDENT IN AN ORGANIZED HEALTH CARE EDUCATION/TRAINING PROGRAM

## 2024-06-06 PROCEDURE — 84145 PROCALCITONIN (PCT): CPT

## 2024-06-06 RX ORDER — AMOXICILLIN 250 MG
1 CAPSULE ORAL DAILY
Status: DISCONTINUED | OUTPATIENT
Start: 2024-06-06 | End: 2024-06-17

## 2024-06-06 RX ORDER — TRIAMCINOLONE ACETONIDE 1 MG/G
CREAM TOPICAL 2 TIMES DAILY
Status: DISCONTINUED | OUTPATIENT
Start: 2024-06-06 | End: 2024-06-18 | Stop reason: HOSPADM

## 2024-06-06 RX ORDER — ACETAMINOPHEN 325 MG/1
650 TABLET ORAL EVERY 6 HOURS PRN
Status: DISCONTINUED | OUTPATIENT
Start: 2024-06-06 | End: 2024-06-07

## 2024-06-06 RX ORDER — LIDOCAINE 50 MG/G
1 PATCH TOPICAL DAILY
Status: DISCONTINUED | OUTPATIENT
Start: 2024-06-06 | End: 2024-06-18 | Stop reason: HOSPADM

## 2024-06-06 RX ORDER — PANTOPRAZOLE SODIUM 40 MG/1
40 TABLET, DELAYED RELEASE ORAL
Status: DISCONTINUED | OUTPATIENT
Start: 2024-06-07 | End: 2024-06-07

## 2024-06-06 RX ORDER — ZINC SULFATE 50(220)MG
220 CAPSULE ORAL DAILY
Status: DISCONTINUED | OUTPATIENT
Start: 2024-06-06 | End: 2024-06-18 | Stop reason: HOSPADM

## 2024-06-06 RX ORDER — VANCOMYCIN HYDROCHLORIDE 1 G/200ML
12.5 INJECTION, SOLUTION INTRAVENOUS DAILY PRN
Status: DISCONTINUED | OUTPATIENT
Start: 2024-06-07 | End: 2024-06-10

## 2024-06-06 RX ORDER — POLYETHYLENE GLYCOL 3350 17 G/17G
17 POWDER, FOR SOLUTION ORAL DAILY PRN
Status: DISCONTINUED | OUTPATIENT
Start: 2024-06-06 | End: 2024-06-18 | Stop reason: HOSPADM

## 2024-06-06 RX ORDER — KETOCONAZOLE 20 MG/G
CREAM TOPICAL 2 TIMES DAILY
Status: DISCONTINUED | OUTPATIENT
Start: 2024-06-06 | End: 2024-06-06 | Stop reason: CLARIF

## 2024-06-06 RX ORDER — CLOBETASOL PROPIONATE 0.5 MG/G
CREAM TOPICAL 2 TIMES DAILY
Status: DISCONTINUED | OUTPATIENT
Start: 2024-06-06 | End: 2024-06-18 | Stop reason: HOSPADM

## 2024-06-06 RX ORDER — NYSTATIN 100000 [USP'U]/G
POWDER TOPICAL 2 TIMES DAILY
Status: DISCONTINUED | OUTPATIENT
Start: 2024-06-06 | End: 2024-06-18 | Stop reason: HOSPADM

## 2024-06-06 RX ORDER — HYDROXYCHLOROQUINE SULFATE 200 MG/1
200 TABLET, FILM COATED ORAL 2 TIMES DAILY
Status: DISCONTINUED | OUTPATIENT
Start: 2024-06-06 | End: 2024-06-18 | Stop reason: HOSPADM

## 2024-06-06 RX ORDER — PREDNISONE 5 MG/1
5 TABLET ORAL 2 TIMES DAILY WITH MEALS
Status: DISCONTINUED | OUTPATIENT
Start: 2024-06-06 | End: 2024-06-07

## 2024-06-06 RX ORDER — SODIUM CHLORIDE, SODIUM GLUCONATE, SODIUM ACETATE, POTASSIUM CHLORIDE, MAGNESIUM CHLORIDE, SODIUM PHOSPHATE, DIBASIC, AND POTASSIUM PHOSPHATE .53; .5; .37; .037; .03; .012; .00082 G/100ML; G/100ML; G/100ML; G/100ML; G/100ML; G/100ML; G/100ML
1000 INJECTION, SOLUTION INTRAVENOUS ONCE
Status: COMPLETED | OUTPATIENT
Start: 2024-06-06 | End: 2024-06-06

## 2024-06-06 RX ORDER — ALBUTEROL SULFATE 90 UG/1
2 AEROSOL, METERED RESPIRATORY (INHALATION) EVERY 6 HOURS PRN
Status: DISCONTINUED | OUTPATIENT
Start: 2024-06-06 | End: 2024-06-18 | Stop reason: HOSPADM

## 2024-06-06 RX ADMIN — CEFEPIME 1000 MG: 1 INJECTION, POWDER, FOR SOLUTION INTRAMUSCULAR; INTRAVENOUS at 15:13

## 2024-06-06 RX ADMIN — VANCOMYCIN HYDROCHLORIDE 1750 MG: 5 INJECTION, POWDER, LYOPHILIZED, FOR SOLUTION INTRAVENOUS at 16:02

## 2024-06-06 RX ADMIN — APIXABAN 5 MG: 5 TABLET, FILM COATED ORAL at 18:55

## 2024-06-06 RX ADMIN — SODIUM CHLORIDE, SODIUM GLUCONATE, SODIUM ACETATE, POTASSIUM CHLORIDE, MAGNESIUM CHLORIDE, SODIUM PHOSPHATE, DIBASIC, AND POTASSIUM PHOSPHATE 1000 ML: .53; .5; .37; .037; .03; .012; .00082 INJECTION, SOLUTION INTRAVENOUS at 16:03

## 2024-06-06 RX ADMIN — CLOBETASOL PROPIONATE: 0.5 CREAM TOPICAL at 17:14

## 2024-06-06 RX ADMIN — TRIAMCINOLONE ACETONIDE: 1 CREAM TOPICAL at 17:14

## 2024-06-06 RX ADMIN — MICONAZOLE NITRATE: 20 CREAM TOPICAL at 17:13

## 2024-06-06 RX ADMIN — SODIUM CHLORIDE 500 ML: 0.9 INJECTION, SOLUTION INTRAVENOUS at 13:08

## 2024-06-06 RX ADMIN — NYSTATIN: 100000 POWDER TOPICAL at 17:13

## 2024-06-06 NOTE — ED PROVIDER NOTES
History  Chief Complaint   Patient presents with    Weakness - Generalized     From Post. Staff reports increased weakness + fatigue. 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 Staff report she has been more lethargic than usual, is experiencing difficulty completing daily activities, and has no appetite and has decrease water intake. Granddaughter states she has been in and out of the hospital this past year and seems to be going downhill. States she has a tremor at baseline especially when the psoriasis is flaring. Notes she usually is lethargic now, laying most of the time and is quiet to answer questions, and that she seems baseline at this presentation. Granddaughter states she was under the impression that the pt was brought in due to back pain and decrease appetite - not necessarily lethargy. Pt only answering some questions, but did explicitly deny chest pain, shortness of breath, headaches, abdominal pain, dysuria. Granddaughter notes she was recently hospitalized for sepsis and so the concern is now always infectious.        Prior to Admission Medications   Prescriptions Last Dose Informant Patient Reported? Taking?   Ascorbic Acid 500 MG CAPS Not Taking  Yes No   Sig: Take 1 capsule by mouth in the morning   Patient not taking: Reported on 4/11/2024   Clobetasol Propionate E 0.05 % emollient cream Not Taking Self, Outside Facility (Specify) Yes No   Sig: APPLY TWICE A DAY FOR PALM SKIN DERMITITS   Patient not taking: Reported on 4/11/2024   Multiple Vitamins-Minerals (Multi Complete) CAPS Unknown  Yes No   Sig: Take 1 capsule by mouth daily   Sodium Phosphates (ENEMA RE) Not Taking  Yes No   Sig: Insert into the rectum Insert 1 applicator rectally as needed for constipation. Give on Day 6 of no bowel movement if Milk of Magnesia and Bisacodyl suppository not effective   Patient not taking: Reported on 6/6/2024   Zinc 50 MG TABS  Not Taking  Yes No   Sig: Take 1 tablet by mouth in the morning   Patient not taking: Reported on 4/11/2024   acetaminophen (TYLENOL) 325 mg tablet Unknown Self, Outside Facility (Specify) No No   Sig: Take 2 tablets (650 mg total) by mouth every 4 (four) hours as needed for mild pain, headaches or fever.   Patient taking differently: Take 650 mg by mouth every 6 (six) hours as needed for mild pain, headaches or fever Give 2 tablets by mouth every 6 hours as needed for fever give 2 tablets every 6 hours as needed for temperature greater tahn 100.5. Do not exceed 3 gm of acetaminophen in 24 hrs   albuterol (Ventolin HFA) 90 mcg/act inhaler Unknown Outside Facility (Specify) No No   Sig: Inhale 2 puffs every 6 (six) hours as needed for wheezing   alendronate (FOSAMAX) 70 mg tablet Unknown Self, Outside Facility (Specify) Yes No   Sig: Take by mouth every 7 days    apixaban (ELIQUIS) 5 mg Unknown Outside Facility (Specify) No No   Sig: Take 1 tablet (5 mg total) by mouth 2 (two) times a day   bisacodyl ([facility] Bisacodyl) 10 mg suppository Unknown  Yes No   Sig: Insert 10 mg into the rectum if needed for constipation Insert 1 suppository rectally as needed for constipation. Give on Day 5 of no bowel movement if Milk of Magnesia not effective   cholecalciferol (VITAMIN D3) 1,000 units tablet Unknown Outside Facility (Specify) No No   Sig: Take 1 tablet (1,000 Units total) by mouth daily   clobetasol (TEMOVATE) 0.05 % cream Unknown  No No   Sig: Apply topically 2 (two) times a day   furosemide (LASIX) 40 mg tablet   No No   Sig: Take 1 tablet (40 mg total) by mouth daily   gabapentin (Neurontin) 100 mg capsule Unknown Outside Facility (Specify) No No   Sig: Take 1 capsule in AM and 3 capsules in PM   hydrocortisone 2.5 % ointment Not Taking  No No   Sig: Apply topically 4 (four) times a day as needed for irritation   Patient not taking: Reported on 4/11/2024   hydroxychloroquine (PLAQUENIL) 200 mg tablet  Outside Facility  (Specify) No No   Sig: Take 1 tablet (200 mg total) by mouth 2 (two) times a day   ketoconazole (NIZORAL) 2 % cream Unknown Self, Outside Facility (Specify) No No   Sig: Apply topically 2 (two) times a day   lidocaine (LIDODERM) 5 % Unknown  No No   Sig: Apply 2 patches topically daily Remove & Discard patch within 12 hours or as directed by MD Do not start before December 20, 2022.   Patient taking differently: Apply 2 patches topically daily Apply to right knee topically one time a day for pain and remove per scheduled   magnesium hydroxide (MILK OF MAGNESIA) 400 mg/5 mL oral suspension Unknown  Yes No   Sig: Take 30 mL by mouth daily as needed for constipation Give 30 ml by mouth as needed for constipation.Give milk and magnesia if now bowel movement in 3 days (72 hrs)   metoprolol tartrate (LOPRESSOR) 25 mg tablet Unknown Outside Facility (Specify) No No   Sig: Take 1 tablet (25 mg total) by mouth every 12 (twelve) hours   nystatin (MYCOSTATIN) powder Unknown Outside Facility (Specify) No No   Sig: Apply topically 2 (two) times a day   omeprazole (PriLOSEC) 20 mg delayed release capsule Unknown Outside Facility (Specify) No No   Sig: Take 1 capsule (20 mg total) by mouth daily   polyethylene glycol (MIRALAX) 17 g packet  Outside Facility (Specify) No No   Sig: Take 17 g by mouth daily Do not start before November 26, 2022.   Patient not taking: Reported on 2/19/2024   potassium chloride (Klor-Con M10) 10 mEq tablet Not Taking  No No   Sig: Take 2 tablets (20 mEq total) by mouth daily   Patient not taking: Reported on 6/6/2024   predniSONE 5 mg tablet Unknown  No No   Sig: Take 1 tablet (5 mg total) by mouth 2 (two) times a day with meals Do not start before June 5, 2023.   senna-docusate sodium (SENOKOT S) 8.6-50 mg per tablet  Outside Facility (Specify) No No   Sig: Take 1 tablet by mouth daily   Patient taking differently: Take 1 tablet by mouth daily Give 1 tablet by mouth one time a day for constipation    triamcinolone (KENALOG) 0.1 % cream Unknown  Yes No   Sig: Apply topically 2 (two) times a day Apply to BUE and BLE topically everyday and evening shift for psoriasis   white petrolatum-mineral oil (EUCERIN,HYDROCERIN) cream Not Taking Self, Outside Facility (Specify) No No   Sig: Apply topically 3 (three) times a day   Patient not taking: Reported on 2024      Facility-Administered Medications: None       Past Medical History:   Diagnosis Date    Abnormal thyroid function test     last assessed: 2015     Arthritis     Caries     last assessed: 2016     Continuous opioid dependence (HCC) 2021    Edema of right lower extremity     last assessed: 2015     GERD (gastroesophageal reflux disease)     Hypertension     Medicare annual wellness visit, subsequent 2021    Positive blood culture 3/11/2022    Sarcoid        Past Surgical History:   Procedure Laterality Date     SECTION      IR PICC PLACEMENT SINGLE LUMEN  2024    MULTIPLE TOOTH EXTRACTIONS N/A 2016    Procedure: Surgical extraction of teeth 2, 18, 19, 30, 31; incision and drainage of left subperiosteal abscess ;  Surgeon: Clara Cevallos DMD;  Location: [facility];  Service:        Family History   Problem Relation Age of Onset    Asthma Mother     Stroke Mother     No Known Problems Father     No Known Problems Sister     No Known Problems Brother     No Known Problems Maternal Grandmother     No Known Problems Maternal Grandfather     No Known Problems Paternal Grandmother     No Known Problems Paternal Grandfather     Diabetes Maternal Aunt     Breast cancer Cousin     Diabetes Cousin     Breast cancer Other      I have reviewed and agree with the history as documented.    E-Cigarette/Vaping    E-Cigarette Use Never User      E-Cigarette/Vaping Substances    Nicotine No     THC No     CBD No     Flavoring No     Other No     Unknown No      Social History     Tobacco Use    Smoking status:  Never    Smokeless tobacco: Never   Vaping Use    Vaping status: Never Used   Substance Use Topics    Alcohol use: Not Currently    Drug use: No       Review of Systems   Reason unable to perform ROS: incomplete as patient is not answering all questions but [facility]. the checked boxes are those that were explicitly reported to me by the patient.   Constitutional:  Positive for appetite change and fatigue. Negative for chills and fever.   HENT:  Positive for congestion.    Respiratory:  Negative for cough and shortness of breath.    Cardiovascular:  Negative for chest pain and palpitations.   Gastrointestinal:  Negative for abdominal pain.   Genitourinary:  Negative for dysuria.   Skin:  Positive for rash.       Physical Exam  Physical Exam  Vitals reviewed.   Constitutional:       General: She is not in acute distress.     Appearance: Normal appearance. She is not ill-appearing, toxic-appearing or diaphoretic.   HENT:      Head: Normocephalic and atraumatic.      Right Ear: Tympanic membrane, ear canal and external ear normal.      Left Ear: Tympanic membrane, ear canal and external ear normal.      Nose: Nose normal.      Mouth/Throat:      Mouth: Mucous membranes are moist.      Pharynx: Oropharynx is clear. No oropharyngeal exudate or posterior oropharyngeal erythema.   Eyes:      General: No scleral icterus.        Right eye: No discharge.         Left eye: No discharge.      Extraocular Movements: Extraocular movements intact.      Conjunctiva/sclera: Conjunctivae normal.      Pupils: Pupils are equal, round, and reactive to light.   Cardiovascular:      Rate and Rhythm: Tachycardia present. Rhythm irregular.      Pulses: Normal pulses.   Pulmonary:      Effort: Pulmonary effort is normal. No respiratory distress.   Abdominal:      Tenderness: There is no abdominal tenderness. There is no right CVA tenderness, left CVA tenderness, guarding or rebound.   Musculoskeletal:          General: Normal range of motion.      Cervical back: Normal range of motion and neck supple. No rigidity or tenderness.   Lymphadenopathy:      Cervical: No cervical adenopathy.   Skin:     Comments: Diffuse dry skin, flaking. Chronic in appearance.    Neurological:      Mental Status: She is alert. Mental status is at baseline.         Vital Signs  ED Triage Vitals   Temperature Pulse Respirations Blood Pressure SpO2   06/06/24 1022 06/06/24 1022 06/06/24 1022 06/06/24 1022 06/06/24 1022   98 °F (36.7 °C) (!) 120 20 147/76 97 %      Temp Source Heart Rate Source Patient Position - Orthostatic VS BP Location FiO2 (%)   06/06/24 1022 06/06/24 1022 06/06/24 2030 06/06/24 2030 --   Oral Monitor Lying Left arm       Pain Score       06/06/24 2349       No Pain           Vitals:    06/07/24 0056 06/07/24 0422 06/07/24 0605 06/07/24 0737   BP: 136/78 130/62 144/50 147/58   Pulse: 105 (!) 122 (!) 117 (!) 122   Patient Position - Orthostatic VS:             Visual Acuity  Visual Acuity      Flowsheet Row Most Recent Value   L Pupil Size (mm) 3   R Pupil Size (mm) 3   L Pupil Shape Round   R Pupil Shape Round            ED Medications  Medications   acetaminophen (TYLENOL) tablet 650 mg (has no administration in time range)   albuterol (PROVENTIL HFA,VENTOLIN HFA) inhaler 2 puff (has no administration in time range)   apixaban (ELIQUIS) tablet 5 mg (5 mg Oral Given 6/6/24 1855)   Cholecalciferol (VITAMIN D3) tablet 1,000 Units (1,000 Units Oral Not Given 6/6/24 1435)   clobetasol (TEMOVATE) 0.05 % cream ( Topical Given 6/6/24 1714)   hydroxychloroquine (PLAQUENIL) tablet 200 mg (0 mg Oral Hold 6/6/24 1851)   lidocaine (LIDODERM) 5 % patch 1 patch (1 patch Topical Not Given 6/6/24 1722)   metoprolol tartrate (LOPRESSOR) tablet 25 mg (25 mg Oral Not Given 6/6/24 2104)   nystatin (MYCOSTATIN) powder ( Topical Given 6/6/24 1713)   pantoprazole (PROTONIX) EC tablet 40 mg (40 mg Oral Not Given 6/7/24 4337)   polyethylene glycol  (MIRALAX) packet 17 g (has no administration in time range)   predniSONE tablet 5 mg (5 mg Oral Not Given 6/6/24 1533)   senna-docusate sodium (SENOKOT S) 8.6-50 mg per tablet 1 tablet (0 tablets Oral Hold 6/6/24 1512)   triamcinolone (KENALOG) 0.1 % cream ( Topical Given 6/6/24 1714)   zinc sulfate (ZINCATE) capsule 220 mg (220 mg Oral Not Given 6/6/24 1533)   cefepime (MAXIPIME) 1,000 mg in dextrose 5 % 50 mL IVPB (1,000 mg Intravenous New Bag 6/7/24 0254)   vancomycin (VANCOCIN) IVPB (premix in dextrose) 1,000 mg 200 mL (has no administration in time range)   miconazole 2 % cream ( Topical Given 6/6/24 1713)   sodium chloride 0.9 % bolus 500 mL (0 mL Intravenous Stopped 6/6/24 1512)   vancomycin (VANCOCIN) 1,750 mg in sodium chloride 0.9 % 500 mL IVPB (0 mg Intravenous Stopped 6/6/24 1810)   multi-electrolyte (ISOLYTE-S PH 7.4) bolus 1,000 mL (0 mL Intravenous Stopped 6/6/24 2104)   metoprolol (LOPRESSOR) injection 2.5 mg (2.5 mg Intravenous Given 6/7/24 0038)   metoprolol (LOPRESSOR) injection 2.5 mg (2.5 mg Intravenous Given 6/7/24 0423)       Diagnostic Studies  Results Reviewed       Procedure Component Value Units Date/Time    Blood culture #1 [[id number]]  (Abnormal) Collected: 06/06/24 1114    Lab Status: Preliminary result Specimen: Blood from Hand, Left Updated: 06/07/24 0734     Gram Stain Result Gram positive cocci in clusters    Blood Culture Identification Panel [[id number]]  (Abnormal) Collected: 06/06/24 1114    Lab Status: Preliminary result Specimen: Blood from Hand, Left Updated: 06/07/24 0734     Staphylococcus aureus Detected     Staphylococcus epidermidis Detected     mecA/C (Methicillin-resistance gene) Detected     MECA/C AND MREJ (MRSA) Detected     INTERNAL QC RESULT Valid    Narrative:      Routine culture and susceptiblity to follow for confirmation.    Film Array panel tests for 11 gram positive organisms, 15 gram negative organisms, 7 yeast species and 10 resistance genes.     MRSA  culture [[id number]] Collected: 06/07/24 0654    Lab Status: No result Specimen: Nares from Nose     Comprehensive metabolic panel [[id number]]     Lab Status: No result Specimen: Blood     Magnesium [[id number]]     Lab Status: No result Specimen: Blood     Phosphorus [[id number]]     Lab Status: No result Specimen: Blood     CBC and differential [[id number]]     Lab Status: No result Specimen: Blood     Procalcitonin [[id number]]     Lab Status: No result Specimen: Blood     Vancomycin, random [[id number]]     Lab Status: No result Specimen: Blood     Urine Microscopic [[id number]]  (Abnormal) Collected: 06/06/24 1720    Lab Status: Final result Specimen: Urine, Straight Cath Updated: 06/06/24 1944     RBC, UA 1-2 /hpf      WBC, UA 10-20 /hpf      Epithelial Cells Occasional /hpf      Bacteria, UA Occasional /hpf      Hyaline Casts, UA 5-10 /lpf     Urine culture [[id number]] Collected: 06/06/24 1720    Lab Status: In process Specimen: Urine, Straight Cath Updated: 06/06/24 1944    UA w Reflex to Microscopic w Reflex to Culture [[id number]]  (Abnormal) Collected: 06/06/24 1720    Lab Status: Final result Specimen: Urine, Straight Cath Updated: 06/06/24 1943     Color, UA Yellow     Clarity, UA Clear     Specific Gravity, UA 1.012     pH, UA 5.0     Leukocytes, UA Moderate     Nitrite, UA Positive     Protein, UA 30 (1+) mg/dl      Glucose, UA Negative mg/dl      Ketones, UA Negative mg/dl      Urobilinogen, UA <2.0 mg/dl      Bilirubin, UA Negative     Occult Blood, UA Negative    Blood culture #2 [[id number]] Collected: 06/06/24 1107    Lab Status: Preliminary result Specimen: Blood from Arm, Left Updated: 06/06/24 1501     Blood Culture Received in Microbiology Lab. Culture in Progress.    Blood gas, venous [[id number]]     Lab Status: No result Specimen: Blood     Ammonia [[id number]]     Lab Status: No result Specimen: Blood     Lactic acid, plasma (w/reflex if result > 2.0) [[id number]]  (Abnormal) Collected: 06/06/24 1114     Lab Status: Final result Specimen: Blood from Hand, Left Updated: 06/06/24 1217     LACTIC ACID 4.2 mmol/L     Narrative:      Result may be elevated if tourniquet was used during collection.    Lactic acid 2 Hours [[id number]]     Lab Status: No result Specimen: Blood     FLU/RSV/COVID - if FLU/RSV clinically relevant [[id number]]  (Normal) Collected: 06/06/24 1117    Lab Status: Final result Specimen: Nares from Nose Updated: 06/06/24 1214     SARS-CoV-2 Negative     INFLUENZA A PCR Negative     INFLUENZA B PCR Negative     RSV PCR Negative    Narrative:      FOR PEDIATRIC PATIENTS - copy/paste COVID Guidelines URL to browser: [email or web address]    SARS-CoV-2 assay is a Nucleic Acid Amplification assay intended for the  qualitative detection of nucleic acid from SARS-CoV-2 in nasopharyngeal  swabs. Results are for the presumptive identification of SARS-CoV-2 RNA.    Positive results are indicative of infection with SARS-CoV-2, the virus  causing COVID-19, but do not rule out bacterial infection or co-infection  with other viruses. Laboratories within the [place] and its  territories are required to report all positive results to the appropriate  public health authorities. Negative results do not preclude SARS-CoV-2  infection and should not be used as the sole basis for treatment or other  patient management decisions. Negative results must be combined with  clinical observations, patient history, and epidemiological information.  This test has not been FDA cleared or approved.    This test has been authorized by FDA under an Emergency Use Authorization  (EUA). This test is only authorized for the duration of time the  declaration that circumstances exist justifying the authorization of the  emergency use of an in vitro diagnostic tests for detection of SARS-CoV-2  virus and/or diagnosis of COVID-19 infection under section 564(b)(1) of  the Act, 21  U.S.C. 360bbb-3(b)(1), unless the authorization is terminated  or revoked sooner. The test has been validated but independent review by FDA  and CLIA is pending.    Test performed using [email or web address]: This RT-PCR assay targets N2,  a region unique to SARS-CoV-2. A conserved region in the E-gene was chosen  for pan-Sarbecovirus detection which includes SARS-CoV-2.    According to CMS-2020-01-R, this platform meets the definition of high-throughput technology.    Procalcitonin [[id number]]  (Normal) Collected: 06/06/24 1107    Lab Status: Final result Specimen: Blood from Arm, Left Updated: 06/06/24 1145     Procalcitonin 0.20 ng/ml     Comprehensive metabolic panel [[id number]]  (Abnormal) Collected: 06/06/24 1107    Lab Status: Final result Specimen: Blood from Arm, Left Updated: 06/06/24 1141     Sodium 139 mmol/L      Potassium 4.5 mmol/L      Chloride 101 mmol/L      CO2 23 mmol/L      ANION GAP 15 mmol/L      BUN 27 mg/dL      Creatinine 1.75 mg/dL      Glucose 105 mg/dL      Calcium 9.8 mg/dL      Corrected Calcium 10.4 mg/dL      AST 34 U/L      ALT 17 U/L      Alkaline Phosphatase 53 U/L      Total Protein 7.3 g/dL      Albumin 3.2 g/dL      Total Bilirubin 0.61 mg/dL      eGFR 28 ml/min/1.73sq m     Narrative:      National Kidney Disease Foundation guidelines for Chronic Kidney Disease (CKD):     Stage 1 with normal or high GFR (GFR > 90 mL/min/1.73 square meters)    Stage 2 Mild CKD (GFR = 60-89 mL/min/1.73 square meters)    Stage 3A Moderate CKD (GFR = 45-59 mL/min/1.73 square meters)    Stage 3B Moderate CKD (GFR = 30-44 mL/min/1.73 square meters)    Stage 4 Severe CKD (GFR = 15-29 mL/min/1.73 square meters)    Stage 5 End Stage CKD (GFR <15 mL/min/1.73 square meters)  Note: GFR calculation is accurate only with a steady state creatinine    Lipase [[id number]]  (Normal) Collected: 06/06/24 1107    Lab Status: Final result Specimen: Blood from Arm, Left Updated: 06/06/24 1141     Lipase 15 u/L     CBC  and differential [[id number]]  (Abnormal) Collected: 06/06/24 1107    Lab Status: Final result Specimen: Blood from Arm, Left Updated: 06/06/24 1115     WBC 6.77 Thousand/uL      RBC 5.59 Million/uL      Hemoglobin 15.7 g/dL      Hematocrit 51.2 %      MCV 92 fL      MCH 28.1 pg      MCHC 30.7 g/dL      RDW 14.9 %      MPV 10.7 fL      Platelets 225 Thousands/uL      nRBC 0 /100 WBCs      Segmented % 74 %      Immature Grans % 1 %      Lymphocytes % 18 %      Monocytes % 5 %      Eosinophils Relative 2 %      Basophils Relative 0 %      Absolute Neutrophils 5.06 Thousands/µL      Absolute Immature Grans 0.04 Thousand/uL      Absolute Lymphocytes 1.19 Thousands/µL      Absolute Monocytes 0.31 Thousand/µL      Eosinophils Absolute 0.15 Thousand/µL      Basophils Absolute 0.02 Thousands/µL                    CT head wo contrast   Final Result by Nnamdi Martienz MD (06/06 1528)      No acute intracranial abnormality.  Chronic microangiopathic changes.                  Workstation performed: [id number]         CT chest abdomen pelvis wo contrast   Final Result by Merritt Lazo MD (06/06 1447)      Mild pulmonary edema likely secondary to CHF.      Small pleural effusions.      No acute findings in the abdomen or pelvis.         The study was marked in EPIC for significant notification.      Workstation performed: [id number]                    Procedures  Procedures         ED Course  ED Course as of 06/07/24 0827   Thu Jun 06, 2024   1117 WBC: 6.77   1117 Hemoglobin(!): 15.7   1144 BUN(!): 27  new   1145 Creatinine(!): 1.75  new   1145 ANION GAP(!): 15   1145 Procalcitonin: 0.20   1217 LACTIC ACID(!!): 4.2   1452 CT chest abdomen pelvis wo contrast  IMPRESSION:     Mild pulmonary edema likely secondary to CHF.     Small pleural effusions.     No acute findings in the abdomen or pelvis.                              Initial Sepsis Screening       Row Name 06/06/24 1234                Is the patient's history  suggestive of a new or worsening infection? No  -AB                  User Key  (r) = Recorded By, (t) = Taken By, (c) = Cosigned By      Initials Name Provider Type    AB Gaby Hartmann PA-C Physician Assistant                    SBIRT 22yo+      Flowsheet Row Most Recent Value   Initial Alcohol Screen: US AUDIT-C     1. How often do you have a drink containing alcohol? 0 Filed at: 06/06/2024 1026   2. How many drinks containing alcohol do you have on a typical day you are drinking?  0 Filed at: 06/06/2024 1026   3a. Male UNDER 65: How often do you have five or more drinks on one occasion? 0 Filed at: 06/06/2024 1026   3b. FEMALE Any Age, or MALE 65+: How often do you have 4 or more drinks on one occassion? 0 Filed at: 06/06/2024 1026   Audit-C Score 0 Filed at: 06/06/2024 1026   RADHA: How many times in the past year have you...    Used an illegal drug or used a prescription medication for non-medical reasons? Never Filed at: 06/06/2024 1026                      Medical Decision Making  Differential diagnosis to include but not limited to dehydration, AMANDA, infection, viral etiology. Pt has persistent tachycardia on the monitor despite being at rest. Pt not meeting SIRS, and there is no obvious suspected source for infection. But due to patient's history of sepsis earlier this year and persistent tachycardia, feeling weak for several days, plan to obtain multiple labs including lactic for further infectious investigation. CBC without leukocytosis, the CMP with AMANDA and anion gap, lactic elevated over four - however there is no signs for source infection, there is no leukocytosis or fevers, and the lab resulted 1.5 hours after being drawn with a tourniquet on so believe this result is not accurate. Will give 500 mL of fluid to resuscitate without causing patient to exacerbate her heart failure. Granddaughter notes the pt is at her baseline activity, is typically less communicative and will sit most of the  time. Is chronically having ambulatory dysfunction, which is another reason she was brought to the ED today. Overall, patient's behavior is at baseline for her. Attempted to call patient's daughter at request of patient's granddaughter in the room, however call continued to fail after multiple attempts. 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 Granddaughter updated and agreeable to the admission for the grandmother. Discussed case with the [facility] provider on call and she was accepted for inpatient admission. Patient stable at time of admission, vital signs reviewed, questions answered.     Problems Addressed:  Acute kidney injury (HCC): acute illness or injury  Weakness: acute illness or injury    Amount and/or Complexity of Data Reviewed  External Data Reviewed: labs, radiology, ECG and notes.  Labs: ordered. Decision-making details documented in ED Course.  Radiology: ordered. Decision-making details documented in ED Course.  ECG/medicine tests: ordered.  Discussion of management or test interpretation with external provider(s): Discussed case with [facility]    Risk  Decision regarding hospitalization.             Disposition  Final diagnoses:   Weakness   Acute kidney injury (HCC)     Time reflects when diagnosis was documented in both MDM as applicable and the Disposition within this note       Time User Action Codes Description Comment    6/6/2024 12:51 PM Gaby Hartmann Add [R53.1] Weakness     6/6/2024 12:51 PM Gaby Hartmann Add [N17.9] Acute kidney injury (HCC)     6/6/2024 12:51 PM Gaby Hartmann Modify [R53.1] Weakness     6/6/2024 12:51 PM Gaby Hartmann Modify [N17.9] Acute kidney injury (HCC)     6/6/2024  2:00 PM Jersey Pretty Add [R53.83] Lethargy           ED Disposition       ED Disposition   Admit    Condition   Stable    Date/Time   Thu Jun 6, 2024 1251    Comment   Case was discussed with [facility]EN[facility] and the patient's admission status was agreed to be  Admission Status: inpatient status to the service of Dr. Moe .               Follow-up Information    None         Current Discharge Medication List        CONTINUE these medications which have NOT CHANGED    Details   acetaminophen (TYLENOL) 325 mg tablet Take 2 tablets (650 mg total) by mouth every 4 (four) hours as needed for mild pain, headaches or fever.  Qty: 30 tablet, Refills: 0      albuterol (Ventolin HFA) 90 mcg/act inhaler Inhale 2 puffs every 6 (six) hours as needed for wheezing  Qty: 18 g, Refills: 0    Comments: Substitution to a formulary equivalent within the same pharmaceutical class is authorized.  Associated Diagnoses: Acute bronchitis, unspecified organism      alendronate (FOSAMAX) 70 mg tablet Take by mouth every 7 days       apixaban (ELIQUIS) 5 mg Take 1 tablet (5 mg total) by mouth 2 (two) times a day  Qty: 60 tablet, Refills: 0    Comments: Pt to schedule follow up visit  Associated Diagnoses: New onset a-fib (HCC)      Ascorbic Acid 500 MG CAPS Take 1 capsule by mouth in the morning      bisacodyl ([employer] Bisacodyl) 10 mg suppository Insert 10 mg into the rectum if needed for constipation Insert 1 suppository rectally as needed for constipation. Give on Day 5 of no bowel movement if Milk of Magnesia not effective      cholecalciferol (VITAMIN D3) 1,000 units tablet Take 1 tablet (1,000 Units total) by mouth daily  Qty: 90 tablet, Refills: 0    Associated Diagnoses: Vitamin D deficiency      clobetasol (TEMOVATE) 0.05 % cream Apply topically 2 (two) times a day  Qty: 60 g, Refills: 0    Associated Diagnoses: Pustular psoriasis      Clobetasol Propionate E 0.05 % emollient cream APPLY TWICE A DAY FOR PALM SKIN DERMITITS      furosemide (LASIX) 40 mg tablet Take 1 tablet (40 mg total) by mouth daily  Qty: 30 tablet, Refills: 0    Associated Diagnoses: Acute diastolic heart failure (HCC)      gabapentin (Neurontin) 100 mg capsule Take 1 capsule in AM and 3 capsules in PM  Qty: 120 capsule,  Refills: 1    Comments: New dose as of 4/21/22  Associated Diagnoses: Lumbar radiculopathy      hydrocortisone 2.5 % ointment Apply topically 4 (four) times a day as needed for irritation  Qty: 30 g, Refills: 0    Associated Diagnoses: Ambulatory dysfunction      hydroxychloroquine (PLAQUENIL) 200 mg tablet Take 1 tablet (200 mg total) by mouth 2 (two) times a day  Qty: 60 tablet, Refills: 0    Associated Diagnoses: Rheumatoid arthritis involving multiple sites, unspecified whether rheumatoid factor present ([facility])      ketoconazole (NIZORAL) 2 % cream Apply topically 2 (two) times a day  Qty: 60 g, Refills: 0    Associated Diagnoses: Rash      lidocaine (LIDODERM) 5 % Apply 2 patches topically daily Remove & Discard patch within 12 hours or as directed by MD Do not start before December 20, 2022.  Refills: 0    Associated Diagnoses: Ambulatory dysfunction      magnesium hydroxide (MILK OF MAGNESIA) 400 mg/5 mL oral suspension Take 30 mL by mouth daily as needed for constipation Give 30 ml by mouth as needed for constipation.Give milk and magnesia if now bowel movement in 3 days (72 hrs)      metoprolol tartrate (LOPRESSOR) 25 mg tablet Take 1 tablet (25 mg total) by mouth every 12 (twelve) hours  Qty: 180 tablet, Refills: 0    Associated Diagnoses: Paroxysmal atrial fibrillation (HCC)      Multiple Vitamins-Minerals (Multi Complete) CAPS Take 1 capsule by mouth daily      nystatin (MYCOSTATIN) powder Apply topically 2 (two) times a day  Qty: 15 g, Refills: 0    Associated Diagnoses: Morbid obesity (HCC)      omeprazole (PriLOSEC) 20 mg delayed release capsule Take 1 capsule (20 mg total) by mouth daily  Qty: 90 capsule, Refills: 3    Associated Diagnoses: Gastroesophageal reflux disease without esophagitis      polyethylene glycol (MIRALAX) 17 g packet Take 17 g by mouth daily Do not start before November 26, 2022.  Qty: 510 g, Refills: 0    Associated Diagnoses: Constipation      potassium chloride (Klor-Con M10) 10  mEq tablet Take 2 tablets (20 mEq total) by mouth daily    Comments: Pt to schedule follow up visit  Associated Diagnoses: Atrial fibrillation, new onset (HCC)      predniSONE 5 mg tablet Take 1 tablet (5 mg total) by mouth 2 (two) times a day with meals Do not start before June 5, 2023.  Qty: 60 tablet, Refills: 0    Associated Diagnoses: Pustular psoriasis      senna-docusate sodium (SENOKOT S) 8.6-50 mg per tablet Take 1 tablet by mouth daily  Qty: 30 tablet, Refills: 0    Associated Diagnoses: Constipation      Sodium Phosphates (ENEMA RE) Insert into the rectum Insert 1 applicator rectally as needed for constipation. Give on Day 6 of no bowel movement if Milk of Magnesia and Bisacodyl suppository not effective      triamcinolone (KENALOG) 0.1 % cream Apply topically 2 (two) times a day Apply to BUE and BLE topically everyday and evening shift for psoriasis      white petrolatum-mineral oil (EUCERIN,HYDROCERIN) cream Apply topically 3 (three) times a day  Qty: 454 g, Refills: 0    Associated Diagnoses: Psoriasis      Zinc 50 MG TABS Take 1 tablet by mouth in the morning             No discharge procedures on file.    PDMP Review         Value Time User    PDMP Reviewed  Yes 5/26/2023 11:45 PM Kb Sethi DO            ED Provider  Electronically Signed by             Gaby Hartmann PA-C  06/07/24 0883

## 2024-06-06 NOTE — ASSESSMENT & PLAN NOTE
Maintained at home with metoprolol tartrate 25mg BID; change to IV as NPO  Continue Eliquis - [id number] score 5 -> change to therapeutic lovenox as NPO   Monitor VS closely  No need for telemetry at this time

## 2024-06-06 NOTE — ASSESSMENT & PLAN NOTE
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

## 2024-06-06 NOTE — ASSESSMENT & PLAN NOTE
Artificial tears up to four times a day as needed. Hot moist compress 2 times a day. Fish oil 2 capsules (around 1,000 mg per pill) by mouth everyday. Any brand is ok. Follow up in 6 months. Not on medications at home  Monitor Ca and P levels inpatient  No need for Nephro consult at this time

## 2024-06-06 NOTE — SEPSIS NOTE
Sepsis Note   Bhavna Al 73 y.o. female MRN: [id number]  Unit/Bed#: ED 29 Encounter: [id number]       Initial Sepsis Screening       Row Name 06/06/24 1234                Is the patient's history suggestive of a new or worsening infection? No  -AB                  User Key  (r) = Recorded By, (t) = Taken By, (c) = Cosigned By      Initials Name Provider Type    AB Gaby Hartmann PA-C Physician Assistant                        There is no height or weight on file to calculate BMI.  Wt Readings from Last 1 Encounters:   02/19/24 113 kg (249 lb)        Ideal body weight: 52.4 kg (115 lb 8.3 oz)  Adjusted ideal body weight: 76.6 kg (168 lb 14.6 oz)

## 2024-06-06 NOTE — H&P
INTERNAL MEDICINE ADMISSION H&P     Name: Bhavna Al   Age & Sex: 73 y.o. female   MRN: [id number]  Unit/Bed#: ED 29   Encounter: [id number]  Primary Care Provider: Nya Taveras DO    Code Status: Level 1 - Full Code  Admission Status: INPATIENT   Disposition: Patient requires Med/Surg    ASSESSMENT/PLAN     Principal Problem:    Lethargy  Active Problems:    Acute kidney injury superimposed on CKD  (HCC)    Rheumatoid arthritis (HCC)    GERD (gastroesophageal reflux disease)    Essential hypertension    Osteoporosis    Vitamin D deficiency    Hyperparathyroidism (HCC)    Paroxysmal atrial fibrillation (HCC)    Psoriasis    Chronic diastolic heart failure (HCC)      * Lethargy  Assessment & Plan  Patient noted to be lethargic for 3 to 4 days prior to admission reduced p.o. 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 for which she completed prolonged IV cefazolin course  High suspicion for infectious etiology given above similar presentation  Start broad-spectrum antibiotics: Vancomycin and cefepime (patient on hydroxychloroquine + chronic prednisone + residing in nursing facility)  Follow blood cultures  Need urinalysis and culture (if reflex)  Get CT head  Follow-up CT chest abdomen pelvis read  Check VBG, NH3  Continue home hydroxychloroquine and prednisone twice daily  Very low threshold to initiate stress dose steroids if she decompensates due to risk of acute adrenal insufficiency  Holding home gabapentin to minimize sedating effects    Acute kidney injury superimposed on CKD  (HCC)  Assessment & Plan  Lab Results   Component Value Date    EGFR 28 06/06/2024    EGFR 65 02/09/2024    EGFR 62 02/08/2024    CREATININE 1.75 (H) 06/06/2024    CREATININE 0.87 02/09/2024    CREATININE 0.90 02/08/2024     CKD 3 at baseline with baseline Cr 0.8-1.0  AMANDA on admission CR  1.75  Likely prerenal azotemia due to reduced p.o. intake for several days prior to admission  S/p 500 cc NS bolus in ED   Isolyte 100 mL/h for 10 hours  Daily BMP  Avoid further nephrotoxic agents  Holding home Lasix given AMANDA, monitor volume status closely  Strict I's/O      Chronic diastolic heart failure (HCC)  Assessment & Plan  Wt Readings from Last 3 Encounters:   02/19/24 113 kg (249 lb)   02/06/24 113 kg (249 lb 5.4 oz)   10/06/23 117 kg (258 lb)     Last EF 65% in 1/2024 although poor windows; prior TTE in 11/2022 with EF 55%  On Lopressor 25mg BID and Lasix 40mg daily at home  Continue home BB  Holding home lasix with concern for prerenal AMANDA in setting of reduced PO intake  Strict I/O  Daily standing weights  Restart Lasix when euvolemic      Psoriasis  Assessment & Plan  Continue topical agents as ordered  Frequent skin exams due to previous admission with staphylococcal scalded skin syndrome    Paroxysmal atrial fibrillation (HCC)  Assessment & Plan  Maintained at home with metoprolol tartrate 25mg BID; continue inpatient  Continue Eliquis - [id number] score 5  Monitor VS closely  No need for telemetry at this time    Hyperparathyroidism (HCC)  Assessment & Plan  Not on medications at home  Monitor Ca and P levels inpatient  No need for Nephro consult at this time    Vitamin D deficiency  Assessment & Plan  Continue Vitamin D supplementation    Osteoporosis  Assessment & Plan  Regimen includes calcium plus vitamin D supplements as well as weekly alendronate  Continue calcium/vitamin D supplementation inpatient, hold weekly alendronate  Fall precautions    Essential hypertension  Assessment & Plan  Continue home Lopressor 25 mg twice daily  Monitor VS closely in setting of suspected infectious etiology with risk for adrenal insufficiency    GERD (gastroesophageal reflux disease)  Assessment & Plan  Continue home PPI    Rheumatoid arthritis (HCC)  Assessment & Plan  Follows with rheumatology  outpatient  Continue home hydroxychloroquine 200 mg twice daily and prednisone 5 mg twice daily            VTE Pharmacologic Prophylaxis: Reason for no pharmacologic prophylaxis - Eliquis  VTE Mechanical Prophylaxis: sequential compression device    CHIEF COMPLAINT     Chief Complaint   Patient presents with    Weakness - Generalized     From [facility]. Staff reports increased weakness + fatigue. Pt not eating or drinking      HISTORY OF PRESENT ILLNESS     72yo F with history of RA on hydroxychloroquine and chronic prednisone, GERD, sarcoidosis, HTN, osteoporosis, hyperparathyroidism, paroxysmal AF, HFpEF (EF 65% in 1/2024 (poor windows) and 55% in 11/2022), CKD3 (baseline Cr 0.8-1.0) presents from nursing home for generalized weakness, decreased appetite and worsening back pain for 3-4 days. Patient and granddaughter provide HPI as patient is lethargic and not responding to all questions (although responding appropriately when she does answer). She believes she has been taking all medications up until today. Patient did explicitly deny abdominal pain, chest pain, dyspnea, dysuria. She did experience brief nausea/vomiting yesterday.     In the ED, patient is hemodynamically stable and mildly tachycardic.  Labs notable for AMANDA on CKD. Otherwise normal CBC and CMP. Procalcitonin negative. Lactate elevated but suspect this is erroneous as it was drawn with a tourniquet still on + hard stick. Although patient did not meet SIRS criteria, infectious workup initiated given immunosuppression and prior admission history. Patient was given 500cc bolus of NS in the ED. Of note, patient was recently admitted in 1/2024 for septic shock secondary to staph bacteremia (which presented similarly) for which she required prolonged course of IV antibiotics.    REVIEW OF SYSTEMS     Review of Systems - 12 point review of systems completed and negative unless stated above.    OBJECTIVE     Vitals:    06/06/24 1022   BP: 147/76   Pulse:  (!) 120   Resp: 20   Temp: 98 °F (36.7 °C)   TempSrc: Oral   SpO2: 97%      Temperature:   Temp (24hrs), Av °F (36.7 °C), Min:98 °F (36.7 °C), Max:98 °F (36.7 °C)    Temperature: 98 °F (36.7 °C)  Intake & Output:  I/O       None          Weights:        There is no height or weight on file to calculate BMI.  Weight (last 2 days)       None          Physical Exam  Vitals reviewed.   Constitutional:       General: She is not in acute distress.     Appearance: Normal appearance. She is well-developed.   HENT:      Head: Normocephalic and atraumatic.   Eyes:      Conjunctiva/sclera: Conjunctivae normal.   Cardiovascular:      Rate and Rhythm: Normal rate. Rhythm irregularly irregular.      Heart sounds: S1 normal and S2 normal. No murmur heard.  Pulmonary:      Effort: Pulmonary effort is normal. No respiratory distress.      Breath sounds: Normal breath sounds.   Abdominal:      General: Abdomen is protuberant. Bowel sounds are normal. There is no distension.      Palpations: Abdomen is soft.      Tenderness: There is no abdominal tenderness.   Musculoskeletal:      Cervical back: Neck supple.      Right lower leg: No edema.      Left lower leg: No edema.   Skin:     General: Skin is warm and dry.   Neurological:      Mental Status: She is oriented to person, place, and time. She is lethargic.   Psychiatric:         Mood and Affect: Mood normal.         Thought Content: Thought content normal.       PAST MEDICAL HISTORY     Past Medical History:   Diagnosis Date    Abnormal thyroid function test     last assessed: 2015     Arthritis     Caries     last assessed: 2016     Continuous opioid dependence (HCC) 2021    Edema of right lower extremity     last assessed: 2015     GERD (gastroesophageal reflux disease)     Hypertension     Medicare annual wellness visit, subsequent 2021    Positive blood culture 3/11/2022    Sarcoid      PAST SURGICAL HISTORY     Past Surgical History:    Procedure Laterality Date     SECTION      IR PICC PLACEMENT SINGLE LUMEN  2024    MULTIPLE TOOTH EXTRACTIONS N/A 2016    Procedure: Surgical extraction of teeth 2, 18, 19, 30, 31; incision and drainage of left subperiosteal abscess ;  Surgeon: Clara Cevallos DMD;  Location: [facility];  Service:      SOCIAL & FAMILY HISTORY     Social History     Substance and Sexual Activity   Alcohol Use Not Currently       Social History     Substance and Sexual Activity   Drug Use No     Social History     Tobacco Use   Smoking Status Never   Smokeless Tobacco Never     Family History   Problem Relation Age of Onset    Asthma Mother     Stroke Mother     No Known Problems Father     No Known Problems Sister     No Known Problems Brother     No Known Problems Maternal Grandmother     No Known Problems Maternal Grandfather     No Known Problems Paternal Grandmother     No Known Problems Paternal Grandfather     Diabetes Maternal Aunt     Breast cancer Cousin     Diabetes Cousin     Breast cancer Other      LABORATORY DATA     Labs: I have personally reviewed pertinent reports.    Results from last 7 days   Lab Units 24  1107   WBC Thousand/uL 6.77   HEMOGLOBIN g/dL 15.7*   HEMATOCRIT % 51.2*   PLATELETS Thousands/uL 225   SEGS PCT % 74   MONO PCT % 5   EOS PCT % 2      Results from last 7 days   Lab Units 24  1107   POTASSIUM mmol/L 4.5   CHLORIDE mmol/L 101   CO2 mmol/L 23   BUN mg/dL 27*   CREATININE mg/dL 1.75*   CALCIUM mg/dL 9.8   ALK PHOS U/L 53   ALT U/L 17   AST U/L 34                  Results from last 7 days   Lab Units 24  1114   LACTIC ACID mmol/L 4.2*         Micro:  Lab Results   Component Value Date    BLOODCX No Growth After 5 Days. 2024    BLOODCX No Growth After 5 Days. 2024    BLOODCX Staphylococcus aureus (A) 2024    BLOODCX Staphylococcus lugdunensis (A) 2024    BLOODCX Staphylococcus capitis (A) 2024    URINECX >100,000 cfu/ml  Klebsiella pneumoniae (A) 12/15/2022    URINECX 20,000-29,000 cfu/ml Proteus mirabilis (A) 12/15/2022    URINECX <10,000 cfu/ml Staphylococcus aureus (A) 12/15/2022     IMAGING & DIAGNOSTIC TESTS     Imaging: I have personally reviewed pertinent reports.    No results found.  EKG, Pathology, and Other Studies: I have personally reviewed pertinent reports.     ALLERGIES     Allergies   Allergen Reactions    Shellfish-Derived Products - Food Allergy Itching    Methotrexate Derivatives     Amoxicillin Rash    Ampicillin-Sulbactam Sodium Rash     MEDICATIONS PRIOR TO ARRIVAL     Prior to Admission medications    Medication Sig Start Date End Date Taking? Authorizing Provider   acetaminophen (TYLENOL) 325 mg tablet Take 2 tablets (650 mg total) by mouth every 4 (four) hours as needed for mild pain, headaches or fever.  Patient taking differently: Take 650 mg by mouth every 6 (six) hours as needed for mild pain, headaches or fever Give 2 tablets by mouth every 6 hours as needed for fever give 2 tablets every 6 hours as needed for temperature greater tahn 100.5. Do not exceed 3 gm of acetaminophen in 24 hrs 8/29/16   AZIZA Hyatt   albuterol (Ventolin HFA) 90 mcg/act inhaler Inhale 2 puffs every 6 (six) hours as needed for wheezing 11/16/22   Nya Taveras DO   alendronate (FOSAMAX) 70 mg tablet Take by mouth every 7 days     Historical Provider, MD   apixaban (ELIQUIS) 5 mg Take 1 tablet (5 mg total) by mouth 2 (two) times a day 11/10/22   Nya Taveras DO   Ascorbic Acid 500 MG CAPS Take 1 capsule by mouth in the morning  Patient not taking: Reported on 4/11/2024    Historical Provider, MD   bisacodyl ([facility] Bisacodyl) 10 mg suppository Insert 10 mg into the rectum if needed for constipation Insert 1 suppository rectally as needed for constipation. Give on Day 5 of no bowel movement if Milk of Magnesia not effective    Historical Provider, MD   cholecalciferol (VITAMIN D3) 1,000 units tablet Take 1  tablet (1,000 Units total) by mouth daily 11/10/22   Nya Taveras DO   clobetasol (TEMOVATE) 0.05 % cream Apply topically 2 (two) times a day 6/1/23   AZIZA Hyatt   Clobetasol Propionate E 0.05 % emollient cream APPLY TWICE A DAY FOR PALM SKIN DERMITITS  Patient not taking: Reported on 4/11/2024 9/29/21   Historical Provider, MD   furosemide (LASIX) 40 mg tablet Take 1 tablet (40 mg total) by mouth daily 12/15/22 4/11/24  Phuc Coley PA-C   gabapentin (Neurontin) 100 mg capsule Take 1 capsule in AM and 3 capsules in PM 11/10/22   Nya Taveras DO   hydrocortisone 2.5 % ointment Apply topically 4 (four) times a day as needed for irritation  Patient not taking: Reported on 4/11/2024 12/19/22   Keriul Connell DO   hydroxychloroquine (PLAQUENIL) 200 mg tablet Take 1 tablet (200 mg total) by mouth 2 (two) times a day 11/10/22 4/11/24  Nya Taveras DO   ketoconazole (NIZORAL) 2 % cream Apply topically 2 (two) times a day 7/23/19   Nya Taveras DO   lidocaine (LIDODERM) 5 % Apply 2 patches topically daily Remove & Discard patch within 12 hours or as directed by MD Do not start before December 20, 2022.  Patient taking differently: Apply 2 patches topically daily Apply to right knee topically one time a day for pain and remove per scheduled 12/20/22   Ann-Marie Connell DO   magnesium hydroxide (MILK OF MAGNESIA) 400 mg/5 mL oral suspension Take 30 mL by mouth daily as needed for constipation Give 30 ml by mouth as needed for constipation.Give milk and magnesia if now bowel movement in 3 days (72 hrs)    Historical Provider, MD   metoprolol tartrate (LOPRESSOR) 25 mg tablet Take 1 tablet (25 mg total) by mouth every 12 (twelve) hours 11/10/22   Nya Taveras DO   Multiple Vitamins-Minerals (Multi Complete) CAPS Take 1 capsule by mouth daily    Historical Provider, MD   nystatin (MYCOSTATIN) powder Apply topically 2 (two) times a day 11/25/22   Federico Piña MD   omeprazole  (PriLOSEC) 20 mg delayed release capsule Take 1 capsule (20 mg total) by mouth daily 11/10/22   Nya Taveras DO   polyethylene glycol (MIRALAX) 17 g packet Take 17 g by mouth daily Do not start before November 26, 2022.  Patient not taking: Reported on 2/19/2024 11/26/22 5/27/23  Federico Piña MD   potassium chloride (Klor-Con M10) 10 mEq tablet Take 2 tablets (20 mEq total) by mouth daily 2/10/24   Toy Rodriguez MD   predniSONE 5 mg tablet Take 1 tablet (5 mg total) by mouth 2 (two) times a day with meals Do not start before June 5, 2023. 6/5/23   AZIZA Hyatt   senna-docusate sodium (SENOKOT S) 8.6-50 mg per tablet Take 1 tablet by mouth daily  Patient taking differently: Take 1 tablet by mouth daily Give 1 tablet by mouth one time a day for constipation 11/25/22 4/11/24  Federico Piña MD   Sodium Phosphates (ENEMA RE) Insert into the rectum Insert 1 applicator rectally as needed for constipation. Give on Day 6 of no bowel movement if Milk of Magnesia and Bisacodyl suppository not effective    Historical Provider, MD   triamcinolone (KENALOG) 0.1 % cream Apply topically 2 (two) times a day Apply to BUE and BLE topically everyday and evening shift for psoriasis    Historical Provider, MD   white petrolatum-mineral oil (EUCERIN,HYDROCERIN) cream Apply topically 3 (three) times a day  Patient not taking: Reported on 2/19/2024 3/15/22   Margarette Smith MD   Zinc 50 MG TABS Take 1 tablet by mouth in the morning  Patient not taking: Reported on 4/11/2024    Historical Provider, MD     MEDICATIONS ADMINISTERED IN LAST 24 HOURS     Medication Administration - last 24 hours from 06/05/2024 1417 to 06/06/2024 1417         Date/Time Order Dose Route Action Action by     06/06/2024 1308 EDT sodium chloride 0.9 % bolus 500 mL 500 mL Intravenous New Bag Jean-Paul Boogie RN          CURRENT MEDICATIONS     Current Facility-Administered Medications   Medication Dose Route Frequency  "Provider Last Rate    acetaminophen  650 mg Oral Q6H PRN Jersey Pretty, DO      albuterol  2 puff Inhalation Q6H PRN Jersey Pretty, DO      apixaban  5 mg Oral BID Jersey Pretty, DO      cefepime  1,000 mg Intravenous Q12H Jersey Pretty, DO      cholecalciferol  1,000 Units Oral Daily Jersey Pretty, DO      clobetasol   Topical BID Jersey Pretty, DO      hydroxychloroquine  200 mg Oral BID Jersey Pretty, DO      ketoconazole   Topical BID Jersey Pretty, DO      lidocaine  1 patch Topical Daily Jersey Pretty, DO      metoprolol tartrate  25 mg Oral Q12H FAMILIA Jersey Pretty, DO      multi-electrolyte  1,000 mL Intravenous Once Jersey Pretty, DO      nystatin   Topical BID Jersey Pretty, DO      [START ON 6/7/2024] pantoprazole  40 mg Oral Early Morning Jersey Pretty, DO      polyethylene glycol  17 g Oral Daily PRN Jersey Pretty, DO      predniSONE  5 mg Oral BID With Meals Jersey Pretty, DO      senna-docusate sodium  1 tablet Oral Daily Jersey Pretty, DO      triamcinolone   Topical BID Jersey Pretty, DO      vancomycin  22.5 mg/kg (Adjusted) Intravenous Once Jersey Pretty, DO      zinc sulfate  220 mg Oral Daily Jersey Pretty, DO          acetaminophen, 650 mg, Q6H PRN  albuterol, 2 puff, Q6H PRN  polyethylene glycol, 17 g, Daily PRN        Admission Time  I spent 1 hour admitting the patient.  This involved direct patient contact where I performed a full history and physical, reviewing previous records, and reviewing laboratory and other diagnostic studies.    Portions of the record may have been created with voice recognition software.  Occasional wrong word or \"sound a like\" substitutions may have occurred due to the inherent limitations of voice recognition software.  Read the chart carefully and recognize, using context, where substitutions have " occurred.    ==  Jersey Pretty DO  [facility]  Internal Medicine Residency PGY-3

## 2024-06-06 NOTE — ED NOTES
"Per phone call from [facility], pt is \"not herself\", decreased PO intake, needs more help with ADL's. Call for any questions [phone]     Svitlana Robins RN  06/06/24 6202    "

## 2024-06-06 NOTE — ASSESSMENT & PLAN NOTE
Follows with rheumatology outpatient  Continue home hydroxychloroquine 200 mg twice daily and prednisone 5 mg twice daily -> switched steroid to IV given lethargy

## 2024-06-06 NOTE — ASSESSMENT & PLAN NOTE
Regimen includes calcium plus vitamin D supplements as well as weekly alendronate  Continue calcium/vitamin D supplementation inpatient, hold weekly alendronate  Fall precautions

## 2024-06-06 NOTE — PROGRESS NOTES
Bhavna Al is a 73 y.o. female who is currently ordered Vancomycin IV with management by the Pharmacy Consult service.  Relevant clinical data and objective / subjective history reviewed.  Vancomycin Assessment:  Indication and Goal AUC/Trough: Soft tissue (goal -600, trough >10)  Clinical Status: stable  Micro:    blood cultures x2: in process   FLU/RSV/Covid: negative   MRSA culture: to be collected    UA with reflex to culture: to be collected  Renal Function:  SCr: 1.75 mg/dL  CrCl: 30.2 mL/min  Renal replacement: Not on dialysis  Days of Therapy: 1  Current Dose: 1750 mg x1  Vancomycin Plan:  New Dosin mg prn vancomycin random level <15 mcg/ml  Next Level:  with AM labs  Renal Function Monitoring: Daily BMP and UOP  Pharmacy will continue to follow closely for s/sx of nephrotoxicity, infusion reactions and appropriateness of therapy.  BMP and CBC will be ordered per protocol. We will continue to follow the patient’s culture results and clinical progress daily.    Shira Powell, Pharmacist

## 2024-06-06 NOTE — ASSESSMENT & PLAN NOTE
Patient noted to be lethargic for 3 to 4 days prior to admission reduced p.o. 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 Daughter thinks she developed stroke at some point this year when she was sick with infection. Hold lovenox if she may need LP

## 2024-06-06 NOTE — ASSESSMENT & PLAN NOTE
Lab Results   Component Value Date    EGFR 28 06/06/2024    EGFR 65 02/09/2024    EGFR 62 02/08/2024    CREATININE 1.75 (H) 06/06/2024    CREATININE 0.87 02/09/2024    CREATININE 0.90 02/08/2024     CKD 3 at baseline with baseline Cr 0.8-1.0  AMANDA on admission CR 1.75  Likely prerenal azotemia due to reduced p.o. intake for several days prior to admission  Cont IVF, hold diuretic  Daily BMP - difficulty with blood draw  Avoid further nephrotoxic agents  Holding home Lasix given AMANDA, monitor volume status closely  Strict I's/O

## 2024-06-06 NOTE — ED NOTES
Per Dr. Moe; hold off on blood work due to multiple IV attempts by different nurses. Provider informed nurses Pt is candidate for PICC placement during admission; can get labs at time of placement     Jean-Paul Boogie RN  06/06/24 4110

## 2024-06-06 NOTE — ASSESSMENT & PLAN NOTE
Wt Readings from Last 3 Encounters:   02/19/24 113 kg (249 lb)   02/06/24 113 kg (249 lb 5.4 oz)   10/06/23 117 kg (258 lb)     Last EF 65% in 1/2024 although poor windows; prior TTE in 11/2022 with EF 55%  On Lopressor 25mg BID and Lasix 40mg daily at home  Continue home BB  Holding home lasix with concern for prerenal AMANDA in setting of reduced PO intake  Strict I/O  Daily standing weights  Restart Lasix when euvolemic

## 2024-06-07 ENCOUNTER — APPOINTMENT (INPATIENT)
Dept: RADIOLOGY | Facility: HOSPITAL | Age: 74
DRG: 539 | End: 2024-06-07
Payer: MEDICARE

## 2024-06-07 PROBLEM — R78.81 BACTEREMIA: Status: ACTIVE | Noted: 2024-06-07

## 2024-06-07 LAB
AMMONIA PLAS-SCNC: 18 UMOL/L (ref 18–72)
ANION GAP SERPL CALCULATED.3IONS-SCNC: 12 MMOL/L (ref 4–13)
BASE EX.OXY STD BLDV CALC-SCNC: 92 % (ref 60–80)
BASE EXCESS BLDV CALC-SCNC: -0.5 MMOL/L
BASOPHILS # BLD AUTO: 0.01 THOUSANDS/ÂΜL (ref 0–0.1)
BASOPHILS NFR BLD AUTO: 0 % (ref 0–1)
BUN SERPL-MCNC: 28 MG/DL (ref 5–25)
CALCIUM SERPL-MCNC: 8.6 MG/DL (ref 8.4–10.2)
CHLORIDE SERPL-SCNC: 109 MMOL/L (ref 96–108)
CO2 SERPL-SCNC: 26 MMOL/L (ref 21–32)
CREAT SERPL-MCNC: 1.85 MG/DL (ref 0.6–1.3)
EOSINOPHIL # BLD AUTO: 0.24 THOUSAND/ÂΜL (ref 0–0.61)
EOSINOPHIL NFR BLD AUTO: 3 % (ref 0–6)
ERYTHROCYTE [DISTWIDTH] IN BLOOD BY AUTOMATED COUNT: 14.9 % (ref 11.6–15.1)
GFR SERPL CREATININE-BSD FRML MDRD: 26 ML/MIN/1.73SQ M
GLUCOSE SERPL-MCNC: 99 MG/DL (ref 65–140)
HCO3 BLDV-SCNC: 25.2 MMOL/L (ref 24–30)
HCT VFR BLD AUTO: 44.7 % (ref 34.8–46.1)
HGB BLD-MCNC: 13.5 G/DL (ref 11.5–15.4)
IMM GRANULOCYTES # BLD AUTO: 0.02 THOUSAND/UL (ref 0–0.2)
IMM GRANULOCYTES NFR BLD AUTO: 0 % (ref 0–2)
LACTATE SERPL-SCNC: 1.4 MMOL/L (ref 0.5–2)
LYMPHOCYTES # BLD AUTO: 1.27 THOUSANDS/ÂΜL (ref 0.6–4.47)
LYMPHOCYTES NFR BLD AUTO: 16 % (ref 14–44)
MCH RBC QN AUTO: 27.7 PG (ref 26.8–34.3)
MCHC RBC AUTO-ENTMCNC: 30.2 G/DL (ref 31.4–37.4)
MCV RBC AUTO: 92 FL (ref 82–98)
MONOCYTES # BLD AUTO: 0.46 THOUSAND/ÂΜL (ref 0.17–1.22)
MONOCYTES NFR BLD AUTO: 6 % (ref 4–12)
NEUTROPHILS # BLD AUTO: 5.8 THOUSANDS/ÂΜL (ref 1.85–7.62)
NEUTS SEG NFR BLD AUTO: 75 % (ref 43–75)
NRBC BLD AUTO-RTO: 0 /100 WBCS
O2 CT BLDV-SCNC: 19.2 ML/DL
PCO2 BLDV: 45.4 MM HG (ref 42–50)
PH BLDV: 7.36 [PH] (ref 7.3–7.4)
PLATELET # BLD AUTO: 228 THOUSANDS/UL (ref 149–390)
PMV BLD AUTO: 11.5 FL (ref 8.9–12.7)
PO2 BLDV: 80.6 MM HG (ref 35–45)
POTASSIUM SERPL-SCNC: 4 MMOL/L (ref 3.5–5.3)
PROCALCITONIN SERPL-MCNC: 0.37 NG/ML
QRS AXIS: -4 DEGREES
QRSD INTERVAL: 92 MS
QT INTERVAL: 308 MS
QTC INTERVAL: 440 MS
RBC # BLD AUTO: 4.87 MILLION/UL (ref 3.81–5.12)
SODIUM SERPL-SCNC: 147 MMOL/L (ref 135–147)
T WAVE AXIS: 113 DEGREES
VANCOMYCIN SERPL-MCNC: 18.2 UG/ML (ref 10–20)
VENTRICULAR RATE: 123 BPM
WBC # BLD AUTO: 7.8 THOUSAND/UL (ref 4.31–10.16)

## 2024-06-07 PROCEDURE — NC001 PR NO CHARGE: Performed by: RADIOLOGY

## 2024-06-07 PROCEDURE — 0HBHXZX EXCISION OF RIGHT UPPER LEG SKIN, EXTERNAL APPROACH, DIAGNOSTIC: ICD-10-PCS

## 2024-06-07 PROCEDURE — 99223 1ST HOSP IP/OBS HIGH 75: CPT | Performed by: INTERNAL MEDICINE

## 2024-06-07 PROCEDURE — 82805 BLOOD GASES W/O2 SATURATION: CPT | Performed by: STUDENT IN AN ORGANIZED HEALTH CARE EDUCATION/TRAINING PROGRAM

## 2024-06-07 PROCEDURE — 76937 US GUIDE VASCULAR ACCESS: CPT

## 2024-06-07 PROCEDURE — 88342 IMHCHEM/IMCYTCHM 1ST ANTB: CPT | Performed by: STUDENT IN AN ORGANIZED HEALTH CARE EDUCATION/TRAINING PROGRAM

## 2024-06-07 PROCEDURE — 88312 SPECIAL STAINS GROUP 1: CPT | Performed by: STUDENT IN AN ORGANIZED HEALTH CARE EDUCATION/TRAINING PROGRAM

## 2024-06-07 PROCEDURE — 93010 ELECTROCARDIOGRAM REPORT: CPT | Performed by: INTERNAL MEDICINE

## 2024-06-07 PROCEDURE — C9113 INJ PANTOPRAZOLE SODIUM, VIA: HCPCS | Performed by: HOSPITALIST

## 2024-06-07 PROCEDURE — NC001 PR NO CHARGE: Performed by: DERMATOLOGY

## 2024-06-07 PROCEDURE — 80202 ASSAY OF VANCOMYCIN: CPT | Performed by: INTERNAL MEDICINE

## 2024-06-07 PROCEDURE — 99233 SBSQ HOSP IP/OBS HIGH 50: CPT | Performed by: HOSPITALIST

## 2024-06-07 PROCEDURE — 84145 PROCALCITONIN (PCT): CPT | Performed by: STUDENT IN AN ORGANIZED HEALTH CARE EDUCATION/TRAINING PROGRAM

## 2024-06-07 PROCEDURE — 92610 EVALUATE SWALLOWING FUNCTION: CPT

## 2024-06-07 PROCEDURE — C1751 CATH, INF, PER/CENT/MIDLINE: HCPCS

## 2024-06-07 PROCEDURE — 93005 ELECTROCARDIOGRAM TRACING: CPT

## 2024-06-07 PROCEDURE — 88305 TISSUE EXAM BY PATHOLOGIST: CPT | Performed by: STUDENT IN AN ORGANIZED HEALTH CARE EDUCATION/TRAINING PROGRAM

## 2024-06-07 PROCEDURE — 87205 SMEAR GRAM STAIN: CPT

## 2024-06-07 PROCEDURE — NC001 PR NO CHARGE: Performed by: PHYSICIAN ASSISTANT

## 2024-06-07 PROCEDURE — 83605 ASSAY OF LACTIC ACID: CPT | Performed by: HOSPITALIST

## 2024-06-07 PROCEDURE — 87147 CULTURE TYPE IMMUNOLOGIC: CPT | Performed by: STUDENT IN AN ORGANIZED HEALTH CARE EDUCATION/TRAINING PROGRAM

## 2024-06-07 PROCEDURE — 36556 INSERT NON-TUNNEL CV CATH: CPT

## 2024-06-07 PROCEDURE — 85025 COMPLETE CBC W/AUTO DIFF WBC: CPT | Performed by: STUDENT IN AN ORGANIZED HEALTH CARE EDUCATION/TRAINING PROGRAM

## 2024-06-07 PROCEDURE — 87070 CULTURE OTHR SPECIMN AEROBIC: CPT

## 2024-06-07 PROCEDURE — 77001 FLUOROGUIDE FOR VEIN DEVICE: CPT | Performed by: RADIOLOGY

## 2024-06-07 PROCEDURE — 87147 CULTURE TYPE IMMUNOLOGIC: CPT

## 2024-06-07 PROCEDURE — 82140 ASSAY OF AMMONIA: CPT | Performed by: HOSPITALIST

## 2024-06-07 PROCEDURE — 36556 INSERT NON-TUNNEL CV CATH: CPT | Performed by: RADIOLOGY

## 2024-06-07 PROCEDURE — 87186 SC STD MICRODIL/AGAR DIL: CPT

## 2024-06-07 PROCEDURE — 76937 US GUIDE VASCULAR ACCESS: CPT | Performed by: RADIOLOGY

## 2024-06-07 PROCEDURE — 80048 BASIC METABOLIC PNL TOTAL CA: CPT | Performed by: HOSPITALIST

## 2024-06-07 PROCEDURE — 87081 CULTURE SCREEN ONLY: CPT | Performed by: STUDENT IN AN ORGANIZED HEALTH CARE EDUCATION/TRAINING PROGRAM

## 2024-06-07 RX ORDER — METOPROLOL TARTRATE 1 MG/ML
2.5 INJECTION, SOLUTION INTRAVENOUS ONCE
Status: COMPLETED | OUTPATIENT
Start: 2024-06-07 | End: 2024-06-07

## 2024-06-07 RX ORDER — METOPROLOL TARTRATE 1 MG/ML
2.5 INJECTION, SOLUTION INTRAVENOUS EVERY 6 HOURS
Status: DISCONTINUED | OUTPATIENT
Start: 2024-06-07 | End: 2024-06-09

## 2024-06-07 RX ORDER — ENOXAPARIN SODIUM 150 MG/ML
1 INJECTION SUBCUTANEOUS EVERY 12 HOURS SCHEDULED
Status: DISCONTINUED | OUTPATIENT
Start: 2024-06-07 | End: 2024-06-07

## 2024-06-07 RX ORDER — METHYLPREDNISOLONE SODIUM SUCCINATE 40 MG/ML
8 INJECTION, POWDER, LYOPHILIZED, FOR SOLUTION INTRAMUSCULAR; INTRAVENOUS DAILY
Status: DISCONTINUED | OUTPATIENT
Start: 2024-06-07 | End: 2024-06-07

## 2024-06-07 RX ORDER — LIDOCAINE WITH 8.4% SOD BICARB 0.9%(10ML)
SYRINGE (ML) INJECTION AS NEEDED
Status: COMPLETED | OUTPATIENT
Start: 2024-06-07 | End: 2024-06-07

## 2024-06-07 RX ORDER — ACETAMINOPHEN 650 MG/1
650 SUPPOSITORY RECTAL EVERY 6 HOURS PRN
Status: DISCONTINUED | OUTPATIENT
Start: 2024-06-07 | End: 2024-06-16

## 2024-06-07 RX ORDER — SODIUM CHLORIDE 9 MG/ML
50 INJECTION, SOLUTION INTRAVENOUS CONTINUOUS
Status: DISCONTINUED | OUTPATIENT
Start: 2024-06-07 | End: 2024-06-09

## 2024-06-07 RX ORDER — PANTOPRAZOLE SODIUM 40 MG/10ML
40 INJECTION, POWDER, LYOPHILIZED, FOR SOLUTION INTRAVENOUS
Status: DISCONTINUED | OUTPATIENT
Start: 2024-06-07 | End: 2024-06-14

## 2024-06-07 RX ADMIN — SODIUM CHLORIDE 50 ML/HR: 0.9 INJECTION, SOLUTION INTRAVENOUS at 14:27

## 2024-06-07 RX ADMIN — HYDROCORTISONE SODIUM SUCCINATE 100 MG: 100 INJECTION, POWDER, FOR SOLUTION INTRAMUSCULAR; INTRAVENOUS at 21:02

## 2024-06-07 RX ADMIN — CEFEPIME 1000 MG: 1 INJECTION, POWDER, FOR SOLUTION INTRAMUSCULAR; INTRAVENOUS at 02:54

## 2024-06-07 RX ADMIN — SODIUM CHLORIDE 500 ML: 0.9 INJECTION, SOLUTION INTRAVENOUS at 15:56

## 2024-06-07 RX ADMIN — METOPROLOL TARTRATE 2.5 MG: 5 INJECTION INTRAVENOUS at 04:23

## 2024-06-07 RX ADMIN — METOROPROLOL TARTRATE 2.5 MG: 5 INJECTION, SOLUTION INTRAVENOUS at 19:31

## 2024-06-07 RX ADMIN — PANTOPRAZOLE SODIUM 40 MG: 40 INJECTION, POWDER, FOR SOLUTION INTRAVENOUS at 15:57

## 2024-06-07 RX ADMIN — METHYLPREDNISOLONE SODIUM SUCCINATE 8 MG: 40 INJECTION, POWDER, FOR SOLUTION INTRAMUSCULAR; INTRAVENOUS at 14:27

## 2024-06-07 RX ADMIN — Medication 10 ML: at 17:07

## 2024-06-07 RX ADMIN — METOPROLOL TARTRATE 2.5 MG: 5 INJECTION INTRAVENOUS at 00:38

## 2024-06-07 RX ADMIN — HYDROCORTISONE SODIUM SUCCINATE 100 MG: 100 INJECTION, POWDER, FOR SOLUTION INTRAMUSCULAR; INTRAVENOUS at 15:57

## 2024-06-07 RX ADMIN — NYSTATIN: 100000 POWDER TOPICAL at 09:00

## 2024-06-07 RX ADMIN — METOROPROLOL TARTRATE 2.5 MG: 5 INJECTION, SOLUTION INTRAVENOUS at 14:27

## 2024-06-07 NOTE — PROGRESS NOTES
Pt with HR in the 110s-120s, Provider AZIZA RUCKER notified and orders received to do an EKG, order completed and provider ordered IV lopressor 2.5mg, med administered around 0030 med somewhat effective, around 0400 HR intermittently increasing to the 130s, provider notified again and another 2.5mg Lopressor given per provider, per Provider pt transferred around 0500 to higher level of care so she could be monitored on tele, report given to DESTINI Ghosh prior to pt transfer,

## 2024-06-07 NOTE — ASSESSMENT & PLAN NOTE
1/2 BC from admission growing MRSA  Unclear if truw infection  Consulted ID - will treat as true  Cont IV Vanco, check 2 D echo  Repeat BC on 6/8  Given recent c/o mid back pain will get MRI  T& L spine & brain

## 2024-06-07 NOTE — PLAN OF CARE
Problem: PAIN - ADULT  Goal: Verbalizes/displays adequate comfort level or baseline comfort level  Description: Interventions:  - Encourage patient to monitor pain and request assistance  - Assess pain using appropriate pain scale  - Administer analgesics based on type and severity of pain and evaluate response  - Implement non-pharmacological measures as appropriate and evaluate response  - Consider cultural and social influences on pain and pain management  - Notify physician/advanced practitioner if interventions unsuccessful or patient reports new pain  Outcome: Progressing     Problem: INFECTION - ADULT  Goal: Absence or prevention of progression during hospitalization  Description: INTERVENTIONS:  - Assess and monitor for signs and symptoms of infection  - Monitor lab/diagnostic results  - Monitor all insertion sites, i.e. indwelling lines, tubes, and drains  - Monitor endotracheal if appropriate and nasal secretions for changes in amount and color  - Halliday appropriate cooling/warming therapies per order  - Administer medications as ordered  - Instruct and encourage patient and family to use good hand hygiene technique  - Identify and instruct in appropriate isolation precautions for identified infection/condition  Outcome: Progressing     Problem: SAFETY ADULT  Goal: Patient will remain free of falls  Description: INTERVENTIONS:  - Educate patient/family on patient safety including physical limitations  - Instruct patient to call for assistance with activity   - Consult OT/PT to assist with strengthening/mobility   - Keep Call bell within reach  - Keep bed low and locked with side rails adjusted as appropriate  - Keep care items and personal belongings within reach  - Initiate and maintain comfort rounds  - Make Fall Risk Sign visible to staff  - Offer Toileting every 2 Hours, in advance of need  - Initiate/Maintain 24/7 alarm  - Obtain necessary fall risk management equipment: SLIDE BOARD  - Apply  yellow socks and bracelet for high fall risk patients  - Consider moving patient to room near nurses station  Outcome: Progressing

## 2024-06-07 NOTE — PROGRESS NOTES
[facility]  Progress Note  Name: Bhavna Al I  MRN: [id number]  Unit/Bed#: [facility] 820-01 I Date of Admission: 6/6/2024   Date of Service: 6/7/2024 I Hospital Day: 1    Assessment & Plan   Paroxysmal atrial fibrillation (HCC)  Assessment & Plan  Maintained at home with metoprolol tartrate 25mg BID; change to IV as NPO  Continue Eliquis - [id number] score 5 -> change to therapeutic lovenox as NPO   Monitor VS closely  No need for telemetry at this time    Bacteremia  Assessment & Plan  1/2 BC from admission growing MRSA  Unclear if truw infection  Consulted ID - will treat as true  Cont IV Vanco, check 2 D echo  Repeat BC on 6/8  Given recent c/o mid back pain will get MRI  T& L spine & brain    Chronic diastolic heart failure (HCC)  Assessment & Plan  Wt Readings from Last 3 Encounters:   02/19/24 113 kg (249 lb)   02/06/24 113 kg (249 lb 5.4 oz)   10/06/23 117 kg (258 lb)     Last EF 65% in 1/2024 although poor windows; prior TTE in 11/2022 with EF 55%  On Lopressor 25mg BID and Lasix 40mg daily at home  Continue home BB  Holding home lasix with concern for prerenal AMANDA in setting of reduced PO intake  Strict I/O  Daily standing weights  Restart Lasix when euvolemic      Psoriasis  Assessment & Plan  Continue topical agents as ordered  Frequent skin exams due to previous admission with staphylococcal scalded skin syndrome  ID recs derm consult to see if this is SSS or her psoriasis    Acute kidney injury superimposed on CKD  (HCC)  Assessment & Plan  Lab Results   Component Value Date    EGFR 28 06/06/2024    EGFR 65 02/09/2024    EGFR 62 02/08/2024    CREATININE 1.75 (H) 06/06/2024    CREATININE 0.87 02/09/2024    CREATININE 0.90 02/08/2024     CKD 3 at baseline with baseline Cr 0.8-1.0  AMANDA on admission CR 1.75  Likely prerenal azotemia due to reduced p.o. intake for several days prior to admission  Cont IVF, hold diuretic  Daily BMP - difficulty with blood draw  Avoid further  nephrotoxic agents  Holding home Lasix given AMANDA, monitor volume status closely  Strict I's/O      Hyperparathyroidism (HCC)  Assessment & Plan  Not on medications at home  Monitor Ca and P levels inpatient  No need for Nephro consult at this time    Vitamin D deficiency  Assessment & Plan  Continue Vitamin D supplementation    Osteoporosis  Assessment & Plan  Regimen includes calcium plus vitamin D supplements as well as weekly alendronate  Continue calcium/vitamin D supplementation inpatient, hold weekly alendronate  Fall precautions    Essential hypertension  Assessment & Plan  home Lopressor 25 mg twice daily  Switch to IV lopressor here    GERD (gastroesophageal reflux disease)  Assessment & Plan  Continue home PPI    Rheumatoid arthritis (HCC)  Assessment & Plan  Follows with rheumatology outpatient  Continue home hydroxychloroquine 200 mg twice daily and prednisone 5 mg twice daily -> switched steroid to IV given lethargy      * Lethargy  Assessment & Plan  Patient noted to be lethargic for 3 to 4 days prior to admission reduced p.o. 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 Daughter thinks she developed stroke at some point this year when she was sick with infection. Hold lovenox if she may need LP         Addendum: adding stress dose steroid too to help her mentation    VTE Pharmacologic Prophylaxis:   Moderate Risk (Score 3-4) - Pharmacological DVT Prophylaxis Ordered: enoxaparin (Lovenox).    Mobility:   Basic Mobility Inpatient Raw Score: 6  -HLM Goal: 2: Bed activities/Dependent transfer  JH-HLM Achieved: 1: Laying in bed  JH-HLM Goal NOT achieved. Continue with multidisciplinary rounding and encourage appropriate mobility to improve upon -HLM goals.    Patient Centered Rounds: I performed bedside rounds with nursing staff today.   Discussions with Specialists or Other Care Team Provider: d/w ID - IV vanco, get echo, MRI B & T/L spine    Education and Discussions with Family / Patient: Updated  (daughter) via phone.    Total Time Spent on Date of Encounter in care of patient: 45 mins. This time was spent on one or more of the following: performing physical exam; counseling and coordination of care; obtaining or reviewing history; documenting in the medical record; reviewing/ordering tests, medications or procedures; communicating with other healthcare professionals and discussing with patient's family/caregivers.    Current Length of Stay: 1 day(s)  Current Patient Status: Inpatient   Certification Statement: The patient will continue to require additional inpatient hospital stay due to ongoing work up  Discharge Plan: Anticipate discharge in >72 hrs to discharge location to be determined pending rehab evaluations.    Code Status: Level 1 - Full Code    Subjective:   Pt is sleepy, opens eye briefly, follows some commands, unable to talk    Objective:     Vitals:   Temp (24hrs), Av.4 °F  (37.4 °C), Min:97.8 °F (36.6 °C), Max:100.9 °F (38.3 °C)    Temp:  [97.8 °F (36.6 °C)-100.9 °F (38.3 °C)] 100.8 °F (38.2 °C)  HR:  [] 136  Resp:  [12-20] 18  BP: (112-147)/(50-84) 139/84  SpO2:  [94 %-100 %] 100 %  There is no height or weight on file to calculate BMI.     Input and Output Summary (last 24 hours):   No intake or output data in the 24 hours ending 06/07/24 1508    Physical Exam:   Physical Exam  Vitals reviewed.   HENT:      Head: Normocephalic and atraumatic.   Cardiovascular:      Rate and Rhythm: Normal rate and regular rhythm.   Pulmonary:      Effort: Pulmonary effort is normal.      Breath sounds: Normal breath sounds.   Abdominal:      General: There is no distension.      Palpations: Abdomen is soft.   Musculoskeletal:      Right lower leg: Edema present.      Left lower leg: Edema present.   Neurological:      Mental Status: She is alert.          Additional Data:     Labs:  Results from last 7 days   Lab Units 06/07/24  1032   WBC Thousand/uL 7.80   HEMOGLOBIN g/dL 13.5   HEMATOCRIT % 44.7   PLATELETS Thousands/uL 228   SEGS PCT % 75   LYMPHO PCT % 16   MONO PCT % 6   EOS PCT % 3     Results from last 7 days   Lab Units 06/06/24  1107   SODIUM mmol/L 139   POTASSIUM mmol/L 4.5   CHLORIDE mmol/L 101   CO2 mmol/L 23   BUN mg/dL 27*   CREATININE mg/dL 1.75*   ANION GAP mmol/L 15*   CALCIUM mg/dL 9.8   ALBUMIN g/dL 3.2*   TOTAL BILIRUBIN mg/dL 0.61   ALK PHOS U/L 53   ALT U/L 17   AST U/L 34   GLUCOSE RANDOM mg/dL 105                 Results from last 7 days   Lab Units 06/07/24  1032 06/06/24  1114 06/06/24  1107   LACTIC ACID mmol/L  --  4.2*  --    PROCALCITONIN ng/ml 0.37*  --  0.20       Lines/Drains:  Invasive Devices       Peripherally Inserted Central Catheter Line  Duration             PICC Line 01/29/24 129 days              Peripheral Intravenous Line  Duration             Peripheral IV 06/06/24 Distal;Dorsal (posterior);Left Forearm 1 day    Peripheral IV 06/07/24 Right  Antecubital <1 day              Drain  Duration             Urethral Catheter Straight-tip 18 Fr. 130 days    External Urinary Catheter <1 day                  Urinary Catheter:  Goal for removal: Remove after 48 hrs of I/O monitoring         Central Line:  Goal for removal: Will discontinue when peripheral access obtained.          Telemetry:  Telemetry Orders (From admission, onward)               24 Hour Telemetry Monitoring  Continuous x 24 Hours (Telem)        Question:  Reason for 24 Hour Telemetry  Answer:  Arrhythmias requiring acute medical intervention / PPM or ICD malfunction                     Telemetry Reviewed: Atrial fibrillation. HR averaging 120-130s  Indication for Continued Telemetry Use: Arrthymias requiring medical therapy             Imaging: Reviewed radiology reports from this admission including: chest CT scan, abdominal/pelvic CT, and CT head    Recent Cultures (last 7 days):   Results from last 7 days   Lab Units 06/06/24  1720 06/06/24  1114 06/06/24  1107   BLOOD CULTURE   --   --  No Growth at 24 hrs.   GRAM STAIN RESULT   --  Gram positive cocci in clusters*  --    URINE CULTURE  Culture results to follow.  --   --        Last 24 Hours Medication List:   Current Facility-Administered Medications   Medication Dose Route Frequency Provider Last Rate    acetaminophen  650 mg Rectal Q6H PRN Marzena Mendez MD      albuterol  2 puff Inhalation Q6H PRN Jersey Pretty, DO      cholecalciferol  1,000 Units Oral Daily Jersey Pretty, DO      clobetasol   Topical BID Jersey Pretty, DO      hydroxychloroquine  200 mg Oral BID Jersey Pretty, DO      lidocaine  1 patch Topical Daily Jersey Pretty, DO      methylPREDNISolone sodium succinate  8 mg Intravenous Daily Marzena Mendez MD      metoprolol  2.5 mg Intravenous Q6H Marzena Mendez MD      miconazole   Topical BID Jersey Pretty, DO      nystatin   Topical BID Jersey Pretty DO       pantoprazole  40 mg Intravenous Q24H FAMILIA Marzena Mendez MD      polyethylene glycol  17 g Oral Daily PRN Jersey Pretty DO      senna-docusate sodium  1 tablet Oral Daily Jersey Pretty DO      sodium chloride  50 mL/hr Intravenous Continuous Marzena Mendez MD 50 mL/hr (06/07/24 1427)    triamcinolone   Topical BID Jersey Pretty DO      vancomycin  12.5 mg/kg (Adjusted) Intravenous Daily PRN Ann Moe MD      zinc sulfate  220 mg Oral Daily Jersey Pretty DO          Today, Patient Was Seen By: Marzena Mendez MD    **Please Note: This note may have been constructed using a voice recognition system.**

## 2024-06-07 NOTE — OCCUPATIONAL THERAPY NOTE
Occupational Therapy         Patient Name: Bhavna Al  Today's Date: 6/7/2024 06/07/24 1349   OT Last Visit   OT Visit Date 06/07/24   Note Type   Note type Screen   Cancel Reasons Other       OT orders received and pt chart reviewed. Per CM, pt is a Mirian lift, non ambulatory, wheel chair bound, assist of 1-2 ADLs. No skilled acute IP OT needs identified at this time. Will remain available if skilled acute needs arise. D/c OT.     Jameel Henriquez MS, OTR/L     [facility] CERTIFIED RATER ID [id number]

## 2024-06-07 NOTE — CONSULTS
e-Consult (IPC)  - Interventional Radiology  Bhavna Al 73 y.o. female MRN: [id number]  Unit/Bed#: [facility] 820-01 Encounter: [id number]          Interventional Radiology has been consulted to evaluate Bhavna Al    We were consulted concerning this patient with poor IV access, bacteremia.    Inpatient Consult to IR  Consult performed by: Karen Palencia PA-C  Consult ordered by: Marzena Mendez MD        06/07/24    Assessment/Recommendation:   Patient is a 73-year-old female with a history of HTN, GERD, CHF, paroxysmal A-fib on Eliquis, rheumatoid arthritis, psoriasis, CKD 3 who presented to the ED yesterday with lethargy and weakness.  Patient found to be borderline tachypneic and tachycardic with elevated lactic, but afebrile and no leukocytosis noted.  Patient was hospitalized earlier this year for staphylococcal scalded skin syndrome.  Patient also noted to have AMANDA on CKD.  Today patient noted to have gram-positive bacteremia.  Per vascular access note consult was placed for midline, but nephrology asked for preservation of peripheral vasculature.  Patient does have a peripheral IV in place.  IR was consulted to evaluate patient for durable IV access and IV antibiotics.    -Plan for IR temporary central line placement today, pending IR schedule  -Patient does not need to be NPO  -Remainder of care per primary team    5-10 minutes, >50% of the total time devoted to medical consultative verbal/EMR discussion between providers. Written report will be generated in the EMR.     Thank you for allowing Interventional Radiology to participate in the care of Bhavna Al. Please don't hesitate to call or TigerText us with any questions.     Karen Palencia PA-C

## 2024-06-07 NOTE — PROCEDURES
Central Line    Date/Time: 6/6/2024 10:21 AM    Performed by: Alexsander Randall MD  Authorized by: Alexsander Randall MD    Patient location:  IR  Consent:     Consent obtained:  Verbal    Consent given by:  Guardian    Risks discussed:  Bleeding and infection  Universal protocol:     Immediately prior to procedure, a time out was called: yes      Patient identity confirmed:  Provided demographic data  Pre-procedure details:     Hand hygiene: Hand hygiene performed prior to insertion      Sterile barrier technique: All elements of maximal sterile technique followed      Skin preparation:  2% chlorhexidine    Skin preparation agent: Skin preparation agent completely dried prior to procedure    Indications:     Central line indications: medications requiring central line    Anesthesia (see MAR for exact dosages):     Anesthesia method:  Local infiltration    Local anesthetic:  Lidocaine 1% w/o epi  Procedure details:     Location:  Right internal jugular    Vessel type: vein      Laterality:  Right    Approach: percutaneous technique used      Patient position:  Flat    Catheter type:  Double lumen    Catheter size:  5 Fr    Ultrasound guidance: yes      Ultrasound image availability:  Images available in PACS    Sterile ultrasound techniques: Sterile gel and sterile probe covers were used      Number of attempts:  1    Successful placement: yes      Catheter tip vessel location: atriocaval junction    Post-procedure details:     Post-procedure:  Dressing applied and line sutured    Assessment:  Blood return through all ports, no pneumothorax on x-ray, free fluid flow and placement verified by x-ray    Post-procedure complications: none      Patient tolerance of procedure:  Tolerated well, no immediate complications  Comments:      5 Fr 15 cm double lumen power injectable catheter

## 2024-06-07 NOTE — PROGRESS NOTES
Bhavna Al is a 73 y.o. female who is currently ordered Vancomycin IV with management by the Pharmacy Consult service.  Relevant clinical data and objective / subjective history reviewed.  Vancomycin Assessment:  Indication and Goal AUC/Trough: Soft tissue (goal -600, trough >10)  Clinical Status: stable  Micro:     Renal Function:  SCr: 1.75 mg/dL  CrCl: 33.6 mL/min  Renal replacement: Not on dialysis  Days of Therapy: 2  Current Dose: 1000mg IV daily prn when level < 15  Vancomycin Plan:  New Dosing: continue current dosing  Estimated AUC: N/A  Estimated Trough: N/A  Next Level: Random 6/8 at 0600  Renal Function Monitoring: Daily BMP and UOP  Pharmacy will continue to follow closely for s/sx of nephrotoxicity, infusion reactions and appropriateness of therapy.  BMP and CBC will be ordered per protocol. We will continue to follow the patient’s culture results and clinical progress daily.    Fe Fitzgerald, Pharmacist

## 2024-06-07 NOTE — SPEECH THERAPY NOTE
Speech Language/Pathology  Speech-Language Pathology Bedside Swallow Evaluation      Patient Name: Bhavna Al    Today's Date: 2024     Problem List  Principal Problem:    Lethargy  Active Problems:    Rheumatoid arthritis (HCC)    GERD (gastroesophageal reflux disease)    Essential hypertension    Osteoporosis    Vitamin D deficiency    Hyperparathyroidism (HCC)    Acute kidney injury superimposed on CKD  (HCC)    Paroxysmal atrial fibrillation (HCC)    Psoriasis    Chronic diastolic heart failure (HCC)    Bacteremia      Past Medical History  Past Medical History:   Diagnosis Date    Abnormal thyroid function test     last assessed: 2015     Arthritis     Caries     last assessed: 2016     Continuous opioid dependence (HCC) 2021    Edema of right lower extremity     last assessed: 2015     GERD (gastroesophageal reflux disease)     Hypertension     Medicare annual wellness visit, subsequent 2021    Positive blood culture 3/11/2022    Sarcoid        Past Surgical History  Past Surgical History:   Procedure Laterality Date     SECTION      IR PICC PLACEMENT SINGLE LUMEN  2024    MULTIPLE TOOTH EXTRACTIONS N/A 2016    Procedure: Surgical extraction of teeth 2, 18, 19, 30, 31; incision and drainage of left subperiosteal abscess ;  Surgeon: Clara Cevallos DMD;  Location: [facility];  Service:        Summary   Pt presents with  severe oral and suspected moderate-severe pharyngeal dysphagia.  Symptoms or concerns included reduced ability to retrieve and manage material -unable to control the material.  Anterior loss was noted w/ attempted cups sips.  Only able to hold the material when offered a piped straw of thin and highly suspect that there was loss and spill prior to swallow attempts.  There is suspected pharyngeal swallow delay and suspected decreased hyolaryngeal elevation upon palpation.  Pt attempted mult swallows and audible gurgling was noted as  well as some mild gagging.     Risk/s for Aspiration: mentation     Recommended Diet: NPO and Continue frequent oral care   May need to consider short term ng if mentation does not improve as it has been several day prior to hospital that she was not taking po.   Recommended Form of Meds: non-oral if possible   Aspiration precautions and swallowing strategies:  recs to follow as the pt's mentation improves.  Other Recommendations: Continue frequent oral care, ST to follow for diet/strategies         Current Medical Status  Pt is a 73 y.o. female who presented to [facility] with increased lethargy and weakness.  Pt w/ poor po intake. Pt spiking fevers and found to have gram-positive bacteremia Pt for central line placement in  today.      Current Precautions:  Fall  Aspiration  Contact    Pt w/ staph scalded skin syndrome    Allergies:  shellfish    Past medical history:  Please see H&P for details    Special Studies:  CT brain-No acute intracranial abnormality.  Chronic microangiopathic changes.     MBS 1/18/22 Summary:  Images are on PACS for review.   Pt presents w/ mild oropharyngeal dysphagia venkata by mildly prolonged mastication and oral manipulation, delayed swallow initiation, and min-mild pharyngeal residue. Pt w/ slow transfer of material. All material spills to the valleculae prior to delayed hyo-laryngeal excursion. Min-mild vallecular retention noted after the swallow w/ subsequent pooling in the pyriforms. No penetration or aspiration on today's study. Brief screening of the esophagus revealed slow motility and retention.         Recommendations:  Diet: Regular   Liquids: Thin   Meds: As tolerated  Strategies: Mult. Swallows   Upright position  F/u ST tx: Yes, brief to review VBS findings and recommendations   Social/Education/Vocational Hx:  Pt lives in SNF/ECF    Swallow Information   Current Risks for Dysphagia & Aspiration: AMS  Current Symptoms/Concerns:  poor po intake   Current Diet:  regular diet and thin liquids   Baseline Diet: regular diet and thin liquids      Baseline Assessment   Behavior/Cognition:  eyes open, she does not track nor make eye contact  Speech/Language Status: not able to to follow commands and no verbal output noted  Patient Positioning: upright in bed  Pain Status/Interventions/Response to Interventions:   No report of or nonverbal indications of pain.       Swallow Mechanism Exam  Facial: masked facies  Labial: decreased strength, decreased coordination, and unable to test 2/2 limited command following  Lingual: unable to test 2/2 limited command following  Velum: unable to visualize  Mandible:  decreased ROM  Dentition:  unable to see in oral cavity  Vocal quality: nonverbal    Volitional Cough: unable to initiate volitional cough   Respiratory Status: on NC    Consistencies Assessed and Performance   Consistencies Administered: thin liquids and puree-piped thin, cup thin.    Oral Stage: severe, decreased bolus acceptance, decreased bolus propulsion, and decreased mastication  Poor retrieval to no retrieval w/ material spilling anteriorly from the oral cavity.  ?able transfers and lingual propulsion as there did not appear to be any movement in the oral cavity.    Pharyngeal Stage: suspected, moderate, moderate-severe, suspected pharyngeal swallow delay, suspected decreased hyolaryngeal elevation upon palpation, suspected pharyngeal residue, and multiple swallows  Mult swallows, gurgling, and mild gagging.  Does not respond to questions.   Esophageal Concerns:  h/o esoph dysmotility    Strategies and Efficacy: not effective    Summary and Recommendations (see above)    Results Reviewed with: patient, RN, and MD     Treatment Recommended: yes     Frequency of treatment: as able     Patient Stated Goal:    Dysphagia LTG  -Patient will demonstrate safe and effective oral intake (without overt s/s significant oral/pharyngeal dysphagia including s/s penetration or aspiration)  for the highest appropriate diet level.     Short Term Goals:  -Patient will tolerate trials of upgraded food and/or liquid texture with no significant s/s of oral or pharyngeal dysphagia including aspiration across 1-3 diagnostic sessions       Speech Therapy Prognosis   Prognosis:  guarded    Prognosis Considerations: medical status and cognitive status

## 2024-06-07 NOTE — CONSULTS
DERMATOLOGY:  CONSULTATION   Bhavna Al 73 y.o. female MRN: [id number]  Unit/Bed#: [facility] 820-01 Encounter: [id number]          Inpatient consult to Dermatology     Date/Time  6/7/2024 5:04 PM     Performed by  Petra Velarde MD   Authorized by  Marzena Mendez MD                 Assessment/Recommendations   Based on a thorough discussion of this condition and the management approach to it (including a comprehensive discussion of the known risks, side effects and potential benefits of treatment), the patient (family) agrees to implement the following specific plan:        74 y/o F immunocompromised patient (on plaquenil 200 mg BID and chronic prednisone 5 mg BID for RA) residing in nursing home, hyperparathyroidism, sarcoidosis, psoriasis and CKD3 who presents for lethargy and fever and found to have diffuse desquamative rash over body and recent history of staph scalded skin syndrome concerning for recurrence iso fever, positive blood cultures (1/2; unclear if contaminant?), and AMANDA on CKD. Prior episode of SSSS required prolonged IV cefazolin course. ID consulted for blood culture positivity and recommend continuing vancomycin. Patient is w/o significant improvement on vancomycin x1 day.     Notably, patient also has other prior admissions for rashes more c/w pustular psoriasis as well as prior biopsies demonstrating psoriasiform dermatitis. At this time, primary team is uncertain if her current rash represents a pustular psoriasis flare (possibly from plaquenil or abrupt cessation of prednisone or another unknown trigger) or a recurrent rash of staph scalded skin syndrome.     Rash appears most c/w pustular psoriasis +/- a component of SSSS. Derm will assist in performing further definitive workup. Continue to cover for SSSS with vancomycin (+/- linezolid pending wound cultures). Given lack of outpatient follow up in the past, would recommend initiation of inpatient treatment for psoriasis if this is deemed the  source of the rash.    Recommendations:  - Derm will perform STAT punch biopsy x2  - Derm will obtain multiple wound cultures of unroofed pustules, axilla, perineum, conjunctiva, lips, inside ears, urethral meatus. Follow up MRSA nares. If gram + cocci growing on wound cultures, would suspect there is a SSSS component and would recommend initiation of linezolid on top of vanc to assist in toxin clearance.  - Continue supportive care, IV fluids, vancomycin per ID while awaiting further infectious workup and biopsy results.  - Use only nonadherent dressings such as petroleum-impregnated gauze on areas of denuded epidermis.  - If biopsies c/w pustular psoriasis, would recommend initiation of spizolumab while inpatient.   - Derm will continue to follow.         Please set up discharge follow up by calling our office: [phone])     Thank you for involving me in the care of your patient. Please call with questions, change in clinical status or if tests recommended above are abnormal.     Discussed with the primary service.      History:     HISTORY OF PRESENT ILLNESS:    Reason for Consult: rash  HPI: Bhavna Al is a 73 y.o. year old female history of RA on hydroxychloroquine and chronic prednisone, GERD, sarcoidosis, HTN, osteoporosis, hyperparathyroidism, paroxysmal AF, HFpEF (EF 65% in 1/2024 and 55% in 11/2022), Afib, CKD3 (baseline Cr 0.8-1.0) presents from nursing home for generalized weakness, decreased appetite and worsening back pain for 3-4 days. Patient and granddaughter provided HPI as patient is lethargic and not responding to all questions. She believes she has been taking all medications up until today. Patient did explicitly deny abdominal pain, chest pain, dyspnea, dysuria. She did experience brief nausea/vomiting yesterday.      In the ED, patient was hemodynamically stable and mildly tachycardic.  Labs notable for AMANDA on CKD. Although patient did not meet SIRS criteria, infectious workup  initiated given immunosuppression and prior admission history. Patient was given 500cc bolus of NS in the ED.     Of note, patient was recently admitted in 2024 for septic shock secondary to staph bacteremia (which presented similarly) for which she required prolonged course of IV antibiotics.Has been admitted for multiple similar presentations since  with pustular psoriasis as the primary diagnosis. Reports of a desquamative rash extend back to ~    Trialed Humira in the past for her RA without improvement and this was stopped.     Pt has allergy to amoxicillin (rash).    PAST MEDICAL HISTORY:  Past Medical History:   Diagnosis Date    Abnormal thyroid function test     last assessed: 2015     Arthritis     Caries     last assessed: 2016     Continuous opioid dependence (HCC) 2021    Edema of right lower extremity     last assessed: 2015     GERD (gastroesophageal reflux disease)     Hypertension     Medicare annual wellness visit, subsequent 2021    Positive blood culture 3/11/2022    Sarcoid      Past Surgical History:   Procedure Laterality Date     SECTION      IR PICC PLACEMENT SINGLE LUMEN  2024    MULTIPLE TOOTH EXTRACTIONS N/A 2016    Procedure: Surgical extraction of teeth 2, 18, 19, 30, 31; incision and drainage of left subperiosteal abscess ;  Surgeon: Clara Cevallos DMD;  Location: [facility];  Service:        FAMILY HISTORY:  Non-contributory    SOCIAL HISTORY:  Social History   Single  Social History     Substance and Sexual Activity   Alcohol Use Not Currently     Social History     Substance and Sexual Activity   Drug Use No     Social History     Tobacco Use   Smoking Status Never   Smokeless Tobacco Never       ALLERGIES:  Allergies   Allergen Reactions    Shellfish-Derived Products - Food Allergy Itching    Methotrexate Derivatives     Amoxicillin Rash    Ampicillin-Sulbactam Sodium Rash       MEDICATIONS:  All current active  medications have been reviewed.   Zinc sulfate capsule 220 mg daily  Plaquenil 200 mg BID  Miconazole 2% cream, nystatin powder BID  TAC cream 0.1% and clobetasol .05% cream  Hydrocortisone injection 100 mg q8h IV      Physical exam:     Temp:  [97.8 °F (36.6 °C)-100.9 °F (38.3 °C)] 99.5 °F (37.5 °C)  HR:  [] 136  Resp:  [12-20] 18  BP: ()/(50-84) 77/58  SpO2:  [94 %-100 %] 100 %  Temp (24hrs), Av.4 °F (37.4 °C), Min:97.8 °F (36.6 °C), Max:100.9 °F (38.3 °C)  Current: Temperature: 99.5 °F (37.5 °C)    Mild desquamation periorally with diffuse xerosis  Abdomen c/w diffuse erythema studded with sheets of pustules                      Labs, Imaging, & Other Studies     Lab Results:  I have personally reviewed pertinent labs.   Normal WBC and CBC  CMP with elevated BUN of 27, elevated creatinine of 1.75, BUN/Cr= 15.43, elevated corrected calcium 10.4, normal liver enzymes and normal alk phos  Blood culture 1/2 drawn 24 growing gram + cocci in clusters--> + staph aureus (MRSA detected), staph epidermidis although may represent contaminant because 2nd blood culture negative at 24 hours  Lactate 4.2  UA with moderate leukocytes, nitrites, 30+ protein, occasional bacteria, and hyaline casts 5-10  MRSA nares pending  Mg pending (low at 1.7 on 2/10/24)      Results from last 7 days   Lab Units 24  1032 24  1107   WBC Thousand/uL 7.80 6.77   HEMOGLOBIN g/dL 13.5 15.7*   PLATELETS Thousands/uL 228 225     Results from last 7 days   Lab Units 24  1107   POTASSIUM mmol/L 4.5   CHLORIDE mmol/L 101   CO2 mmol/L 23   BUN mg/dL 27*   CREATININE mg/dL 1.75*   EGFR ml/min/1.73sq m 28   CALCIUM mg/dL 9.8   AST U/L 34   ALT U/L 17   ALK PHOS U/L 53     Results from last 7 days   Lab Units 24  1720 24  1114 24  1107   BLOOD CULTURE   --   --  No Growth at 24 hrs.   GRAM STAIN RESULT   --  Gram positive cocci in clusters*  --    URINE CULTURE  Culture results to follow.  --   --             Pathology:   I have personally reviewed pertinent reports.              Petra Velarde  PGY3 Dermatology Resident    Please note, all recommendations are not finalized until signed off by an Attending

## 2024-06-07 NOTE — WOUND OSTOMY CARE
Consult Note - Wound   Bhavna P Mikaela 73 y.o. female MRN: [id number]  Unit/Bed#: [facility] 820-01 Encounter: [id number]        History and Present Illness:  Patient is a 72 yo female that was admitted to [facility] for treatment of lethargy. Patient has a PMH of GERD, HF, CKD 3, and HTN. Patient is completely dependent for care needs on assessment - unable to assess level of orientation at time of assessment.  Patient does not follow commands. Patient is a mod/max assist x 2 for turning and repositioning. Patient is incontinent of bowel and bladder. On assessment, patient is lying on regular mattress. Post assessment, patient placed on [employer] turning and repositioning system.  Patient is currently NPO- primary RN reports that patient failed swallow evaluation. ID consulted for patient- per their note, patient with history of staph scalded skin syndrome associated with Staphylococcus lugdunensis bacteremia earlier this year. Patient's skin with generalized dry and flaky tissue noted throughout body- creams are ordered for areas. BMI of 44.11 with significant skin folds. Dermatology consulted.     Wound Care was consulted for multiple wounds    Assessment Findings:   B/L heels are dry intact and karly with no skin loss or wounds present. Recommend preventative Hydraguard Cream and proper offloading/ repositioning.  Recommend offloading boots for patient's feet.       MASD Mid Sacrum: scattered areas of partial thickness skin measured together- MASD/IAD in etiology. Wound beds are pink and beefy red in color. Maia-wounds are fragile. Scant sanguinous drainage noted. Recommend calazime cream to area.     POA Left Lower Buttocks/Ischium Stage 3 Pressure Injury: linear in shape area of full thickness skin loss. likely pressure moisture in etiology.  Wound bed with mix of beefy red/pink  tissue and yellow in color tissue. Miaa-wound is fragile. Scant sanguinous drainage noted. Recommend calazime cream to area.     Left  Posterior Upper Thigh Fold MASD/ITD: linear in shape area of partial thickness skin loss located along base of deep skin fold. Cannot fully exclude pressure component. Wound bed is beefy red in color and bleeding. Prince-wound is fragile. Scant sanguinous drainage noted. Recommend calcium alginate rope to area.     Left Lateral Back Fold MASD/ITD:  linear in shape area of partial thickness skin loss located along base of skin fold. Wound bed is pink in color and prince-wound is fragile. Scant serosanguineous drainage noted. Recommend calcium alginate rope to areas.     Right Lateral Back Fold MASD/ITD:  linear in shape area of scattered partial thickness skin loss located along base of skin fold that are measured together and extends to pannus fold. Wound bed is pink in color and prince-wound is fragile. Scant serosanguineous drainage noted. Recommend calcium alginate rope to areas.     Left Posterior Distal Thigh/Knee Fold MASD/ITD: linear in shape area of partial thickness skin loss located along base of deep skin fold. Cannot fully exclude pressure component. Wound bed is beefy red in color and bleeding. Prince-wound is fragile. Scant sanguinous drainage noted. Recommend calcium alginate rope to area.     Left Lateral Hip Wound: unknown etiology. Cannot fully rule out pressure component. Scattered areas of partial thickness skin loss measured together. Wound beds are pink in color and prince-wounds are fragile. Scant sanguinous drainage noted. Recommend calazime cream to area.     Right Lateral Ankle: RESOLVED. Intact area of blanchable hyperpigmented tissue. Recommend preventative hydraguard to area.     Right Buttocks MASD: small circular in shape area of partial thickness skin loss. Wound bed is beefy red in color, bleeding and blanches. Prince-wound is fragile. Scant sanguinous drainage noted. Recommend calazime cream to areas.     No induration, fluctuance, odor, warmth/temperature differences, redness, or purulence  noted to the above noted wounds and skin areas assessed. New dressings applied per orders listed below. Patient tolerated well- no s/s of non-verbal pain or discomfort observed during the encounter. Bedside nurse aware of plan of care. See flow sheets for more detailed assessment findings.      Orders listed below and wound care will continue to follow, call or Secure Chat Message with questions.     Skin Care Plan:  1-B/L Buttocks, Left Ischium, Mid Sacrum, and Left Lateral Hip Wounds: Cleanse with NSS and pat dry. Apply calazime to open wound beds only and apply hydraguard to intact prince-wounds/ all other intact aspects of sacro-buttocks. Apply Both TID or PRN episodes of incontinence  2-Turn/reposition q2h or when medically stable for pressure re-distribution on skin. Place patient on [employer] Turning and Repositioning System.   3-Elevate heels to offload pressure. Apply Offloading boots to B/L feet   4-Moisturize skin daily with skin nourishing cream  5-Ehob cushion in chair when out of bed.  6-Preventative Hydraguard to bilateral heels and ankles BID and PRN.   7-B/L Back, Abdomen/Pannus, Left Posterior Thigh & Knee Folds: cleanse with soap and water. Pat dry. Apply calcium alginate rope to areas. Change daily or PRN soilage/displacement.       Wounds:  Wound 01/27/24 Pressure Injury Ankle Lateral;Right (Active)   Wound Image   06/07/24 1058   Wound Description Intact 06/07/24 1058   Prince-wound Assessment Intact 06/07/24 1058   Wound Length (cm) 0 cm 06/07/24 1058   Wound Width (cm) 0 cm 06/07/24 1058   Wound Depth (cm) 0 cm 06/07/24 1058   Wound Surface Area (cm^2) 0 cm^2 06/07/24 1058   Wound Volume (cm^3) 0 cm^3 06/07/24 1058   Calculated Wound Volume (cm^3) 0 cm^3 06/07/24 1058   Change in Wound Size % 100 06/07/24 1058   Non-staged Wound Description Not applicable 06/07/24 1058   Treatments Cleansed;Site care;Elevated 06/07/24 1058   Dressing Moisture barrier 06/07/24 1058   Dressing Changed New 06/07/24  1058   Patient Tolerance Tolerated well 06/07/24 1058   Dressing Status Intact 06/07/24 1058       Wound 01/29/24 MASD Buttocks Right (Active)   Wound Image   06/07/24 1055   Wound Description Beefy red;Bleeding 06/07/24 1058   Maia-wound Assessment Fragile 06/07/24 1058   Wound Length (cm) 0.3 cm 06/07/24 1055   Wound Width (cm) 0.2 cm 06/07/24 1055   Wound Depth (cm) 0.1 cm 06/07/24 1055   Wound Surface Area (cm^2) 0.06 cm^2 06/07/24 1055   Wound Volume (cm^3) 0.006 cm^3 06/07/24 1055   Calculated Wound Volume (cm^3) 0.01 cm^3 06/07/24 1055   Change in Wound Size % 99.17 06/07/24 1055   Drainage Amount Scant 06/07/24 1058   Drainage Description Sanguineous 06/07/24 1058   Non-staged Wound Description Partial thickness 06/07/24 1058   Treatments Cleansed;Site care 06/07/24 1058   Dressing Protective barrier 06/07/24 1058   Dressing Changed New 06/07/24 1058   Patient Tolerance Tolerated well 06/07/24 1058   Dressing Status Clean;Intact;Dry 06/07/24 1058       Wound 06/06/24 MASD Sacrum Mid (Active)   Wound Image   06/07/24 1055   Wound Description Beefy red;Pink 06/07/24 1400   Maia-wound Assessment Fragile 06/07/24 1400   Wound Length (cm) 9.5 cm 06/07/24 1055   Wound Width (cm) 2 cm 06/07/24 1055   Wound Depth (cm) 0.1 cm 06/07/24 1055   Wound Surface Area (cm^2) 19 cm^2 06/07/24 1055   Wound Volume (cm^3) 1.9 cm^3 06/07/24 1055   Calculated Wound Volume (cm^3) 1.9 cm^3 06/07/24 1055   Drainage Amount Scant 06/07/24 1400   Drainage Description Sanguineous 06/07/24 1400   Non-staged Wound Description Partial thickness 06/07/24 1400   Treatments Cleansed;Site care 06/07/24 1400   Dressing Protective barrier 06/07/24 1400   Dressing Changed Changed 06/07/24 1400   Patient Tolerance Tolerated well 06/07/24 1400   Dressing Status Intact;Dry;Clean 06/07/24 1400       Wound 06/06/24 Pressure Injury Buttocks Left;Lower (Active)   Wound Image   06/07/24 1054   Wound Description Yellow;Slough;Beefy red;Bleeding 06/07/24  1400   Pressure Injury Stage 3 06/07/24 1400   Maia-wound Assessment Fragile 06/07/24 1400   Wound Length (cm) 3 cm 06/07/24 1054   Wound Width (cm) 0.5 cm 06/07/24 1054   Wound Depth (cm) 0.2 cm 06/07/24 1054   Wound Surface Area (cm^2) 1.5 cm^2 06/07/24 1054   Wound Volume (cm^3) 0.3 cm^3 06/07/24 1054   Calculated Wound Volume (cm^3) 0.3 cm^3 06/07/24 1054   Drainage Amount Scant 06/07/24 1400   Drainage Description Sanguineous 06/07/24 1400   Non-staged Wound Description Full thickness 06/07/24 1400   Treatments Cleansed;Site care 06/07/24 1400   Dressing Protective barrier 06/07/24 1400   Dressing Changed Changed 06/07/24 1400   Patient Tolerance Tolerated well 06/07/24 1400   Dressing Status Intact 06/07/24 1400       Wound 06/06/24 MASD Thigh Left;Posterior;Upper (Active)   Wound Image   06/07/24 1051   Wound Description Beefy red;Bleeding 06/07/24 1400   Maia-wound Assessment Fragile 06/07/24 1400   Wound Length (cm) 0.5 cm 06/07/24 1051   Wound Width (cm) 5 cm 06/07/24 1051   Wound Depth (cm) 0.1 cm 06/07/24 1051   Wound Surface Area (cm^2) 2.5 cm^2 06/07/24 1051   Wound Volume (cm^3) 0.25 cm^3 06/07/24 1051   Calculated Wound Volume (cm^3) 0.25 cm^3 06/07/24 1051   Drainage Amount Scant 06/07/24 1400   Drainage Description Sanguineous 06/07/24 1400   Non-staged Wound Description Partial thickness 06/07/24 1400   Treatments Cleansed;Irrigation with NSS;Site care 06/07/24 1400   Dressing Calcium Alginate 06/07/24 1400   Dressing Changed Changed 06/07/24 1400   Patient Tolerance Tolerated well 06/07/24 1400   Dressing Status Dry;Intact;Clean 06/07/24 1400       Wound 06/06/24 MASD Back Left;Lateral (Active)   Wound Image   06/07/24 1056   Wound Description [place] 06/07/24 1400   Maia-wound Assessment Fragile 06/07/24 1400   Wound Length (cm) 0.3 cm 06/07/24 1056   Wound Width (cm) 3 cm 06/07/24 1056   Wound Depth (cm) 0.1 cm 06/07/24 1056   Wound Surface Area (cm^2) 0.9 cm^2 06/07/24 1056   Wound Volume (cm^3)  0.09 cm^3 06/07/24 1056   Calculated Wound Volume (cm^3) 0.09 cm^3 06/07/24 1056   Drainage Amount Scant 06/07/24 1400   Drainage Description Serosanguineous 06/07/24 1400   Non-staged Wound Description Partial thickness 06/07/24 1400   Treatments Cleansed;Site care;Irrigation with NSS 06/07/24 1400   Dressing Calcium Alginate 06/07/24 1400   Dressing Changed Changed 06/07/24 1400   Patient Tolerance Tolerated well 06/07/24 1400   Dressing Status Intact;Dry;Clean 06/07/24 1400       Wound 06/06/24 MASD Laceration Back Right;Lateral (Active)   Wound Image   06/07/24 1053   Wound Description Pink;Beefy red 06/07/24 1400   Maia-wound Assessment Fragile 06/07/24 1400   Wound Length (cm) 0.4 cm 06/07/24 1053   Wound Width (cm) 17 cm 06/07/24 1053   Wound Depth (cm) 0.1 cm 06/07/24 1053   Wound Surface Area (cm^2) 6.8 cm^2 06/07/24 1053   Wound Volume (cm^3) 0.68 cm^3 06/07/24 1053   Calculated Wound Volume (cm^3) 0.68 cm^3 06/07/24 1053   Drainage Amount Scant 06/07/24 1400   Drainage Description Serosanguineous 06/07/24 1400   Non-staged Wound Description Partial thickness 06/07/24 1400   Treatments Cleansed;Irrigation with NSS;Site care 06/07/24 1400   Dressing Calcium Alginate 06/07/24 1400   Dressing Changed Changed 06/07/24 1400   Patient Tolerance Tolerated well 06/07/24 1400   Dressing Status Intact 06/07/24 1400       Wound 06/06/24 MASD Thigh Distal;Left;Posterior (Active)   Wound Image   06/07/24 1050   Wound Description Bleeding;Beefy red 06/07/24 1400   Maia-wound Assessment Fragile 06/07/24 1400   Wound Length (cm) 0.5 cm 06/07/24 1050   Wound Width (cm) 3.5 cm 06/07/24 1050   Wound Depth (cm) 0.1 cm 06/07/24 1050   Wound Surface Area (cm^2) 1.75 cm^2 06/07/24 1050   Wound Volume (cm^3) 0.175 cm^3 06/07/24 1050   Calculated Wound Volume (cm^3) 0.18 cm^3 06/07/24 1050   Drainage Amount Scant 06/07/24 1400   Drainage Description Sanguineous 06/07/24 1400   Non-staged Wound Description Partial thickness  06/07/24 1400   Treatments Cleansed;Site care 06/07/24 1400   Dressing Calcium Alginate 06/07/24 1400   Dressing Changed New 06/07/24 1400   Patient Tolerance Tolerated well 06/07/24 1400   Dressing Status Intact 06/07/24 1400       Wound 06/07/24 Hip Left;Lateral (Active)   Wound Image   06/07/24 1057   Wound Description Fragile 06/07/24 1057   Maia-wound Assessment Fragile 06/07/24 1057   Wound Length (cm) 5 cm 06/07/24 1057   Wound Width (cm) 0.5 cm 06/07/24 1057   Wound Depth (cm) 0.1 cm 06/07/24 1057   Wound Surface Area (cm^2) 2.5 cm^2 06/07/24 1057   Wound Volume (cm^3) 0.25 cm^3 06/07/24 1057   Calculated Wound Volume (cm^3) 0.25 cm^3 06/07/24 1057   Drainage Amount Scant 06/07/24 1057   Drainage Description Sanguineous 06/07/24 1057   Non-staged Wound Description Partial thickness 06/07/24 1057   Treatments Cleansed;Irrigation with NSS;Site care 06/07/24 1057   Dressing Calcium Alginate 06/07/24 1057   Dressing Changed Changed 06/07/24 1057   Patient Tolerance Tolerated well 06/07/24 1057   Dressing Status Intact 06/07/24 1057               Batsheva Lyons RN, BSN, CWOCN

## 2024-06-07 NOTE — CASE MANAGEMENT
Case Management Progress Note    Patient name Bhavna Al  [facility] [id number]  : 1950 Date 2024       LOS (days): 1  Geometric Mean LOS (GMLOS) (days):   Days to GMLOS:        OBJECTIVE:        Current admission status: Inpatient  Preferred Pharmacy:   [id number] - [place], [place] - [address]  [address]  [place] [address]  Phone: [phone] Fax: [phone]    [facility] - [place], [place] - [address]  [address] [address]  Phone: [phone] Fax: [phone]    Primary Care Provider: Nya Taveras DO    Primary Insurance: MEDICARE  Secondary Insurance: [facility]    PROGRESS NOTE:  CM attempted to call pt's daughter Rhoda Al (Child)  [phone] to introduce role and complete open. Pt's daughter did not answer. CM left  w/ call back number and request for call back. CM team to continue to follow.    Addendum 11:39: CM called [facility] [phone] as pt is LTC resident there. CM spoke w/ Niranjan and was transferred to pt's nursing station. There was no answer from nursing station and no voicemail set up to leave message. CM called a second time and again received no answer. CM team to continue to try to contact [facility] nursing to complete assessment and gain more information of pt baseline.

## 2024-06-07 NOTE — DISCHARGE INSTR - OTHER ORDERS
Skin Care Plan:  1-B/L Buttocks, Left Ischium, Mid Sacrum, and Left Lateral Hip Wounds: Cleanse with NSS and pat dry. Apply calazime to open wound beds only and apply hydraguard to intact prince-wounds/ all other intact aspects of sacro-buttocks. Apply Both TID or PRN episodes of incontinence  2-Turn/reposition q2h or when medically stable for pressure re-distribution on skin. Place patient on [employer] Turning and Repositioning System.   3-Elevate heels to offload pressure. Apply Offloading boots to B/L feet   4-Moisturize skin daily with skin nourishing cream  5-Ehob cushion in chair when out of bed.  6-Preventative Hydraguard to bilateral heels and ankles BID and PRN.   7-B/L Back, Abdomen/Pannus, Left Posterior Thigh & Knee Folds: cleanse with soap and water. Pat dry. Apply calcium alginate rope to areas. Change daily or PRN soilage/displacement.

## 2024-06-07 NOTE — CASE MANAGEMENT
Case Management Assessment & Discharge Planning Note    Patient name Bhavna Al  Location [facility] 820/[facility] 820-01 MRN [id number]  : 1950 Date 2024       Current Admission Date: 2024  Current Admission Diagnosis:Lethargy   Patient Active Problem List    Diagnosis Date Noted Date Diagnosed    Bacteremia 2024     Deep tissue injury 2024     Localized swelling on left hand 2023     Stage 3 chronic kidney disease (HCC) 2023     Febrile illness 2023     Dermatitis associated with incontinence 2023     Right shoulder pain 12/15/2022     Abnormal urinalysis 2022     Ambulatory dysfunction 2022     Hyperuricemia 2022     Lethargy 2022     Chronic diastolic heart failure (HCC) 2022     Acute bronchitis 2022     Assistance needed with transportation 09/15/2022     Abnormal CT of the chest 2022     Gram-positive cocci bacteremia 2022     Psoriasis 2022     Elevated troponin 2022     COVID-19 2022     Acute kidney injury superimposed on CKD  ([facility]) 01/10/2022     Paroxysmal atrial fibrillation ([facility]) 01/10/2022     Hyperparathyroidism ([facility]) 2021     Murmur, cardiac 2021     BPPV (benign paroxysmal positional vertigo) 2018     Seasonal allergic rhinitis due to pollen 2018     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     Osteoporosis 2016     SIRS (systemic inflammatory response syndrome) ([facility]) 2016     Rheumatoid arthritis ([facility]) 2016     GERD (gastroesophageal reflux disease) 2016     Sarcoid 2016     Morbid obesity ([facility]) 2016     Vitamin D deficiency 2015     Essential hypertension 2014     Lumbar radiculopathy 2014       LOS (days): 1  Geometric Mean LOS (GMLOS) (days): 3.1  Days to GMLOS:2.1     OBJECTIVE:    Risk of Unplanned Readmission Score: 21.97         Current admission status: Inpatient       Preferred Pharmacy:   [id number] - [place], [place] - [address]  [address]  [place] [address]  Phone: [phone] Fax: [phone]    [facility] - [place], [place] - [address]  [address] [address]  Phone: [phone] Fax: [phone]    Primary Care Provider: Nya Taveras DO    Primary Insurance: MEDICARE  Secondary Insurance: [facility]    ASSESSMENT:  Active Health Care Proxies       Rhoda Al [facility] Care Representative - Child   Primary Phone: [phone] (Home)                           Readmission Root Cause  30 Day Readmission: No    Patient Information  Admitted from:: Facility  Mental Status: Confused  Assessment information provided by:: Other - please comment (Facility staff member Antoinette Waldrop)  Support Systems: Daughter, Family members, Other (Comment) (staff at [facility])  County of Residence: [place]  What [facility] do you live in?: [place]  Type of Current Residence: Facility ([facility])  Living Arrangements: Other (Comment) ([facility])  Is patient a ?: No    Activities of Daily Living Prior to Admission  Functional Status: Total dependent  Completes ADLs independently?: No  Level of ADL dependence: Total Dependent  Ambulates independently?: No  Level of ambulatory dependence: Assistance  Does patient use assisted devices?: Yes  Assisted Devices (DME) used: Mirian lift, Wheelchair  Does patient currently own DME?: Yes  What DME does the patient currently own?: Mirian lift, Wheelchair  Does the patient have a history of Short-Term Rehab?: Yes  Does patient have a history of HHC?: Yes         Patient Information Continued  Does patient have prescription coverage?: Yes  Does patient receive dialysis treatments?: No         Means of Transportation  Means of Transport to Appts:: Other (Comment) ([place])          DISCHARGE DETAILS:    Discharge planning discussed with:: Antoinette Waldrop- [facility] staff  [place] of  Choice: Yes        Were Treatment Team discharge recommendations reviewed with patient/caregiver?: Yes  Did patient/caregiver verbalize understanding of patient care needs?: Yes  Were patient/caregiver advised of the risks associated with not following Treatment Team discharge recommendations?: Yes    Contacts  Patient Contacts: Rhoda Al (Child)  [phone]  Relationship to Patient:: Family  Contact Method: Phone  Phone Number: [phone]  Reason/Outcome: Discharge Planning       Additional Comments: CM attempted to call pt's daughter Rhoda Al (Child)  [phone]   to introduce role and complete assessment. No response was recieved so a VM was left w/ Call back number. CM then called [facility] twice but was unsuccessful in reaching nursing staff. CM reached out to [facility] via [facility] and was told by Antoinette Waldrop pt is a bedhold there. According to Antoinette Waldrop pt is a Mirian lift, non ambulatory, wheel chair bound, assist of 1-2 ADLs. CM team to continue to follow

## 2024-06-07 NOTE — NURSING NOTE
Vascular access team consult for a midline, nephrology Yoselyn Carr would like us not to place midline for vascular preservation, MD Marzena Mendez made aware, US PIV placed, vascular access team signing off at this time

## 2024-06-07 NOTE — CONSULTS
Consultation - Infectious Disease   Bhavna JANUSZ Al 73 y.o. female MRN: [id number]  Unit/Bed#: [facility] 820-01 Encounter: [id number]      IMPRESSION & RECOMMENDATIONS:   73 year old female with a past medical history of RA on hydroxychloroquine and chronic prednisone, psoriasis, GERD, sarcoidosis, osteoporosis, hyperparathyroidism, paroxysmal AF, HFpEF, CDK3, initially presented to the ED with lethargy, cultures positive for gram positive bacteremia in the setting of chronic immune suppression, medically toxic appearing, awaiting response on vancomycin and monitoring blood cultures.    Gram positive bacteremia: patient has fever and tachycardia in the setting of chronic immunosuppression and blood cultures grew gram positive cocci in clusters, patient currently meets SIRS criteria, unclear source of infection. Possibility of occult source, possibility of the skin as a source or UTI. Negative for pneumonia.   Continue vancomycin, discontinue cefepime  Monitor vancomycin levels  TTE to rule out vegetation  Obtain 2 repeat blood cultures, follow up blood cultures   Trend CBC with differential and CMP  Trend procalcitonin  Lethargy: patient had been noted to be lethargic for 3-4 days prior to admission, reduced PO intake at nursing facility  Monitor cognition  Antibiotics as above  Acute Kidney Injury: likely prerenal due to decreased PO intake at the nursing home and the presence of atrial fibrillation with RVR.  Trend BMP  Dose adjusted antibiotics  Rheumatoid Arthritis  On home hydroxycloroquine and prednisone twice daily  Low threshold to start stress dose steroids if she decompensates  Atrial Fibrillation with rapid ventricular response  On eliquis, metoprolol  Psoriasis  Consult dermatology for consideration of skin lesions as the primary source of infection and prior history of staph scalded skin syndrome  On topical agents now    Have discussed the above management plan in detail with the primary service    Extensive  review of the medical records in epic including review of the notes, radiographs, and laboratory results     HISTORY OF PRESENT ILLNESS:  Reason for Consult: Bacteremia  HPI: Bhavna Al is a 73 y.o. year old female with a past medical history of RA on hydroxychloroquine and chronic prednisone, psoriasis, GERD, sarcoidosis, osteoporosis, hyperparathyroidism, paroxysmal AF, HFpEF, CDK3, initially presented to the ED from her nursing home on 6/6 when staff reported that she has been more lethargic than usual, and has been having more difficulty completing ADLs. Upon arrival, she presented with generalized weakness, decreased appetite, worsening back pain for the last 3-4 days.    In the ED, the patient was HDS and mildly tachycardic, labs were notable for AMANDA on CKD 3. CBC, CMP and procalcitonin were unremarkable. Patient was afebrile, lactic acid was elevated. CT head and CT C/A/P was unremakable.The patient initially did not meet SIRS criteria, but infectious workup was begun due to chronic immunosuppression due to chronic steroid use. She was given a 500cc bolus NS in the ED and was started on Cefepime and Vancomycin.    The patient was previously admitted for septic shock 2/2 gram positive bacteremia due to staph scalded skin syndrome which presented similarly for which she had required a long course of IV antibiotics.     Today the patient is toxic appearing, unable to answer questions.    REVIEW OF SYSTEMS:  A complete review of systems is negative other than that noted in the HPI.    PAST MEDICAL HISTORY:  Past Medical History:   Diagnosis Date    Abnormal thyroid function test     last assessed: Sept 25, 2015     Arthritis     Caries     last assessed: Sept 9, 2016     Continuous opioid dependence (HCC) 12/2/2021    Edema of right lower extremity     last assessed: March 18, 2015     GERD (gastroesophageal reflux disease)     Hypertension     Medicare annual wellness visit, subsequent 12/2/2021    Positive  blood culture 3/11/2022    Sarcoid      Past Surgical History:   Procedure Laterality Date     SECTION      IR PICC PLACEMENT SINGLE LUMEN  2024    MULTIPLE TOOTH EXTRACTIONS N/A 2016    Procedure: Surgical extraction of teeth 2, 18, 19, 30, 31; incision and drainage of left subperiosteal abscess ;  Surgeon: Clara Cevallos DMD;  Location: [facility];  Service:        FAMILY HISTORY:  Non-contributory    SOCIAL HISTORY:  Social History   Social History     Substance and Sexual Activity   Alcohol Use Not Currently     Social History     Substance and Sexual Activity   Drug Use No     Social History     Tobacco Use   Smoking Status Never   Smokeless Tobacco Never       ALLERGIES:  Allergies   Allergen Reactions    Shellfish-Derived Products - Food Allergy Itching    Methotrexate Derivatives     Amoxicillin Rash    Ampicillin-Sulbactam Sodium Rash       MEDICATIONS:  All current active medications have been reviewed.      PHYSICAL EXAM:  Temp:  [97.8 °F (36.6 °C)-100.9 °F (38.3 °C)] 100.9 °F (38.3 °C)  HR:  [104-122] 122  Resp:  [12-20] 12  BP: (112-147)/(50-78) 147/58  SpO2:  [94 %-100 %] 100 %  Temp (24hrs), Av.2 °F (37.3 °C), Min:97.8 °F (36.6 °C), Max:100.9 °F (38.3 °C)  Current: Temperature: (!) 100.9 °F (38.3 °C)  No intake or output data in the 24 hours ending 24 1039    General Appearance:  Toxic appearing, in acute distress, responds to name   Head:  Normocephalic, without obvious abnormality, atraumatic   Eyes:  Conjunctiva pink and sclera anicteric, both eyes   Nose: Nares normal, mucosa normal, no drainage   Throat: Oropharynx moist without lesions   Neck: Supple, symmetrical, no adenopathy, no tenderness/mass/nodules   Back:   Symmetric, no curvature, ROM normal, no CVA tenderness   Lungs:   Clear to auscultation bilaterally, respirations unlabored   Chest Wall:  No tenderness or deformity   Heart:  RRR; no murmur, rub or gallop   Abdomen:   Soft, non-tender, non-distended,  positive bowel sounds    Extremities: No cyanosis, clubbing or edema   Skin: Multiple scaling plaques bilateral upper and lower extremities   Lymph nodes: Cervical, supraclavicular nodes normal   Neurologic: Alert and oriented times 3, lethargic       LABS, IMAGING, & OTHER STUDIES:  Lab Results:  I have personally reviewed pertinent labs.  Results from last 7 days   Lab Units 06/06/24  1107   WBC Thousand/uL 6.77   HEMOGLOBIN g/dL 15.7*   PLATELETS Thousands/uL 225     Results from last 7 days   Lab Units 06/06/24  1107   SODIUM mmol/L 139   POTASSIUM mmol/L 4.5   CHLORIDE mmol/L 101   CO2 mmol/L 23   BUN mg/dL 27*   CREATININE mg/dL 1.75*   EGFR ml/min/1.73sq m 28   CALCIUM mg/dL 9.8   AST U/L 34   ALT U/L 17   ALK PHOS U/L 53     Results from last 7 days   Lab Units 06/06/24  1114 06/06/24  1107   BLOOD CULTURE   --  Received in Microbiology Lab. Culture in Progress.   GRAM STAIN RESULT  Gram positive cocci in clusters*  --      Results from last 7 days   Lab Units 06/06/24  1107   PROCALCITONIN ng/ml 0.20                   Imaging Studies:   I have personally reviewed pertinent imaging study reports and images in PACS.      Other Studies:   I have personally reviewed pertinent reports.

## 2024-06-08 ENCOUNTER — APPOINTMENT (OUTPATIENT)
Dept: RADIOLOGY | Facility: HOSPITAL | Age: 74
DRG: 539 | End: 2024-06-08
Payer: MEDICARE

## 2024-06-08 ENCOUNTER — APPOINTMENT (INPATIENT)
Dept: NON INVASIVE DIAGNOSTICS | Facility: HOSPITAL | Age: 74
DRG: 539 | End: 2024-06-08
Payer: MEDICARE

## 2024-06-08 LAB
ALBUMIN SERPL BCP-MCNC: 2.4 G/DL (ref 3.5–5)
ALP SERPL-CCNC: 32 U/L (ref 34–104)
ALT SERPL W P-5'-P-CCNC: 15 U/L (ref 7–52)
ANION GAP SERPL CALCULATED.3IONS-SCNC: 12 MMOL/L (ref 4–13)
AORTIC VALVE MEAN VELOCITY: 9.2 M/S
APICAL FOUR CHAMBER EJECTION FRACTION: 66 %
AST SERPL W P-5'-P-CCNC: 21 U/L (ref 13–39)
AV AREA BY CONTINUOUS VTI: 1.9 CM2
AV AREA PEAK VELOCITY: 1.5 CM2
AV LVOT MEAN GRADIENT: 2 MMHG
AV LVOT PEAK GRADIENT: 3 MMHG
AV MEAN GRADIENT: 4 MMHG
AV PEAK GRADIENT: 5 MMHG
AV VALVE AREA: 1.86 CM2
AV VELOCITY RATIO: 0.77
BASOPHILS # BLD AUTO: 0 THOUSANDS/ÂΜL (ref 0–0.1)
BASOPHILS NFR BLD AUTO: 0 % (ref 0–1)
BILIRUB SERPL-MCNC: 0.42 MG/DL (ref 0.2–1)
BSA FOR ECHO PROCEDURE: 2.04 M2
BUN SERPL-MCNC: 32 MG/DL (ref 5–25)
CALCIUM ALBUM COR SERPL-MCNC: 9.7 MG/DL (ref 8.3–10.1)
CALCIUM SERPL-MCNC: 8.4 MG/DL (ref 8.4–10.2)
CHLORIDE SERPL-SCNC: 110 MMOL/L (ref 96–108)
CO2 SERPL-SCNC: 25 MMOL/L (ref 21–32)
CREAT SERPL-MCNC: 1.78 MG/DL (ref 0.6–1.3)
DOP CALC AO PEAK VEL: 1.15 M/S
DOP CALC AO VTI: 21.6 CM
DOP CALC LVOT AREA: 2.01 CM2
DOP CALC LVOT CARDIAC INDEX: 1.65 L/MIN/M2
DOP CALC LVOT CARDIAC OUTPUT: 3.37 L/MIN
DOP CALC LVOT DIAMETER: 1.6 CM
DOP CALC LVOT PEAK VEL VTI: 20.04 CM
DOP CALC LVOT PEAK VEL: 0.88 M/S
DOP CALC LVOT STROKE INDEX: 19.1 ML/M2
DOP CALC LVOT STROKE VOLUME: 40.27
DOP CALC MV VTI: 26.93 CM
EOSINOPHIL # BLD AUTO: 0 THOUSAND/ÂΜL (ref 0–0.61)
EOSINOPHIL NFR BLD AUTO: 0 % (ref 0–6)
ERYTHROCYTE [DISTWIDTH] IN BLOOD BY AUTOMATED COUNT: 14.9 % (ref 11.6–15.1)
FRACTIONAL SHORTENING: 30 (ref 28–44)
GFR SERPL CREATININE-BSD FRML MDRD: 27 ML/MIN/1.73SQ M
GLUCOSE SERPL-MCNC: 89 MG/DL (ref 65–140)
HCT VFR BLD AUTO: 37.5 % (ref 34.8–46.1)
HGB BLD-MCNC: 11.4 G/DL (ref 11.5–15.4)
IMM GRANULOCYTES # BLD AUTO: 0.05 THOUSAND/UL (ref 0–0.2)
IMM GRANULOCYTES NFR BLD AUTO: 1 % (ref 0–2)
INTERVENTRICULAR SEPTUM IN DIASTOLE (PARASTERNAL SHORT AXIS VIEW): 1.3 CM
INTERVENTRICULAR SEPTUM: 1.3 CM (ref 0.6–1.1)
LAAS-AP2: 26.7 CM2
LAAS-AP4: 23.4 CM2
LEFT ATRIUM AREA SYSTOLE SINGLE PLANE A4C: 22.8 CM2
LEFT ATRIUM VOLUME (MOD BIPLANE): 92 ML
LEFT ATRIUM VOLUME INDEX (MOD BIPLANE): 45.1 ML/M2
LEFT INTERNAL DIMENSION IN SYSTOLE: 2.3 CM (ref 2.1–4)
LEFT VENTRICULAR INTERNAL DIMENSION IN DIASTOLE: 3.3 CM (ref 3.5–6)
LEFT VENTRICULAR POSTERIOR WALL IN END DIASTOLE: 1.3 CM
LEFT VENTRICULAR STROKE VOLUME: 28 ML
LVSV (TEICH): 28 ML
LYMPHOCYTES # BLD AUTO: 0.42 THOUSANDS/ÂΜL (ref 0.6–4.47)
LYMPHOCYTES NFR BLD AUTO: 6 % (ref 14–44)
MAGNESIUM SERPL-MCNC: 1.8 MG/DL (ref 1.9–2.7)
MCH RBC QN AUTO: 28.4 PG (ref 26.8–34.3)
MCHC RBC AUTO-ENTMCNC: 30.4 G/DL (ref 31.4–37.4)
MCV RBC AUTO: 93 FL (ref 82–98)
MONOCYTES # BLD AUTO: 0.31 THOUSAND/ÂΜL (ref 0.17–1.22)
MONOCYTES NFR BLD AUTO: 4 % (ref 4–12)
MV MEAN GRADIENT: 2 MMHG
MV PEAK GRADIENT: 8 MMHG
MV STENOSIS PRESSURE HALF TIME: 58 MS
MV VALVE AREA BY CONTINUITY EQUATION: 1.5 CM2
MV VALVE AREA P 1/2 METHOD: 3.79
NEUTROPHILS # BLD AUTO: 6.2 THOUSANDS/ÂΜL (ref 1.85–7.62)
NEUTS SEG NFR BLD AUTO: 89 % (ref 43–75)
NRBC BLD AUTO-RTO: 0 /100 WBCS
PHOSPHATE SERPL-MCNC: 4.2 MG/DL (ref 2.3–4.1)
PLATELET # BLD AUTO: 176 THOUSANDS/UL (ref 149–390)
PMV BLD AUTO: 11.4 FL (ref 8.9–12.7)
POTASSIUM SERPL-SCNC: 3.8 MMOL/L (ref 3.5–5.3)
PROT SERPL-MCNC: 5.4 G/DL (ref 6.4–8.4)
RBC # BLD AUTO: 4.02 MILLION/UL (ref 3.81–5.12)
RIGHT ATRIUM AREA SYSTOLE A4C: 18.6 CM2
SL CV LEFT ATRIUM LENGTH A2C: 5.9 CM
SL CV LV EF: 65
SL CV PED ECHO LEFT VENTRICLE DIASTOLIC VOLUME (MOD BIPLANE) 2D: 45 ML
SL CV PED ECHO LEFT VENTRICLE SYSTOLIC VOLUME (MOD BIPLANE) 2D: 18 ML
SODIUM SERPL-SCNC: 147 MMOL/L (ref 135–147)
TR MAX PG: 9 MMHG
TR PEAK VELOCITY: 1.5 M/S
TRICUSPID ANNULAR PLANE SYSTOLIC EXCURSION: 1.7 CM
TRICUSPID VALVE PEAK REGURGITATION VELOCITY: 1.49 M/S
VANCOMYCIN SERPL-MCNC: 11.5 UG/ML (ref 10–20)
WBC # BLD AUTO: 6.98 THOUSAND/UL (ref 4.31–10.16)

## 2024-06-08 PROCEDURE — 93306 TTE W/DOPPLER COMPLETE: CPT | Performed by: INTERNAL MEDICINE

## 2024-06-08 PROCEDURE — 80053 COMPREHEN METABOLIC PANEL: CPT | Performed by: HOSPITALIST

## 2024-06-08 PROCEDURE — 99232 SBSQ HOSP IP/OBS MODERATE 35: CPT | Performed by: FAMILY MEDICINE

## 2024-06-08 PROCEDURE — 84100 ASSAY OF PHOSPHORUS: CPT | Performed by: HOSPITALIST

## 2024-06-08 PROCEDURE — 80202 ASSAY OF VANCOMYCIN: CPT | Performed by: INTERNAL MEDICINE

## 2024-06-08 PROCEDURE — 70551 MRI BRAIN STEM W/O DYE: CPT

## 2024-06-08 PROCEDURE — 99233 SBSQ HOSP IP/OBS HIGH 50: CPT | Performed by: INTERNAL MEDICINE

## 2024-06-08 PROCEDURE — 83735 ASSAY OF MAGNESIUM: CPT | Performed by: HOSPITALIST

## 2024-06-08 PROCEDURE — 93306 TTE W/DOPPLER COMPLETE: CPT

## 2024-06-08 PROCEDURE — 85025 COMPLETE CBC W/AUTO DIFF WBC: CPT | Performed by: INTERNAL MEDICINE

## 2024-06-08 PROCEDURE — C9113 INJ PANTOPRAZOLE SODIUM, VIA: HCPCS | Performed by: HOSPITALIST

## 2024-06-08 RX ORDER — VANCOMYCIN HYDROCHLORIDE 1 G/200ML
1000 INJECTION, SOLUTION INTRAVENOUS ONCE
Status: COMPLETED | OUTPATIENT
Start: 2024-06-08 | End: 2024-06-08

## 2024-06-08 RX ADMIN — METOROPROLOL TARTRATE 2.5 MG: 5 INJECTION, SOLUTION INTRAVENOUS at 12:53

## 2024-06-08 RX ADMIN — VANCOMYCIN HYDROCHLORIDE 1000 MG: 1 INJECTION, SOLUTION INTRAVENOUS at 12:54

## 2024-06-08 RX ADMIN — METOROPROLOL TARTRATE 2.5 MG: 5 INJECTION, SOLUTION INTRAVENOUS at 18:00

## 2024-06-08 RX ADMIN — METOROPROLOL TARTRATE 2.5 MG: 5 INJECTION, SOLUTION INTRAVENOUS at 05:41

## 2024-06-08 RX ADMIN — HYDROCORTISONE SODIUM SUCCINATE 100 MG: 100 INJECTION, POWDER, FOR SOLUTION INTRAMUSCULAR; INTRAVENOUS at 22:37

## 2024-06-08 RX ADMIN — PANTOPRAZOLE SODIUM 40 MG: 40 INJECTION, POWDER, FOR SOLUTION INTRAVENOUS at 13:02

## 2024-06-08 RX ADMIN — HYDROCORTISONE SODIUM SUCCINATE 100 MG: 100 INJECTION, POWDER, FOR SOLUTION INTRAMUSCULAR; INTRAVENOUS at 13:51

## 2024-06-08 RX ADMIN — LIDOCAINE 5% 1 PATCH: 700 PATCH TOPICAL at 09:45

## 2024-06-08 RX ADMIN — HYDROCORTISONE SODIUM SUCCINATE 100 MG: 100 INJECTION, POWDER, FOR SOLUTION INTRAMUSCULAR; INTRAVENOUS at 05:42

## 2024-06-08 RX ADMIN — NYSTATIN: 100000 POWDER TOPICAL at 16:43

## 2024-06-08 NOTE — PROGRESS NOTES
Progress Note - Infectious Disease   Bhavna Al 73 y.o. female MRN: [id number]  Unit/Bed#: [facility] 820-01 Encounter: [id number]      Assessment/plan:  1.  Gram-positive bacteremia.  With MRSA and coagulase-negative Staphylococcus isolated in 1 out of 2 blood cultures.  While this could be a contaminant with only 1 out of 2 sets positive, in the setting of clinical sepsis and significant cutaneous breakdown, more likely a true bacteremia.  Fortunately the patient remains hemodynamically stable.  Fevers are improving on antibiotic.  -Continue IV vancomycin  -Pharmacy follow-up for vancomycin dose management  -Recheck blood cultures to confirm clearance of bacteremia.  Cultures are ordered but not yet collected.  -Follow-up echocardiogram report     2.  Systemic inflammatory response syndrome.  With fever and tachycardia.  Suspect secondary to the gram-positive bacteremia.  Consideration for the possibly of other etiologies such as adrenal insufficiency.  Fevers are improving.  Hemodynamics remain stable.  -Antibiotics as above  -Recheck CBC with differential and BMP to make sure no developing toxicities  -Steroid per primary service  -Supportive care     3.  Acute encephalopathy.  Patient quite lethargic over the last 3 to 4 days.  Suspect all related to the patient's primary process as above.  Remains hemodynamically stable.  -Monitor cognition  -Treat SIRS as above  -Follow-up pending MRIs     4.  Psoriasis.  With a recent diagnosis of staph scalded skin syndrome.  Not well-controlled regardless.  Dermatology input appreciated.  Status post punch biopsy.  Multiple wound cultures were also obtained.  Cultures are negative thus far.  -Antibiotic plan as above  -Follow-up wound cultures  -Follow-up pathology  -Dermatology follow-up     5.  Rheumatoid arthritis.  On hydroxychloroquine and prednisone.  Risk factor for relative adrenal insufficiency.  -Steroid per primary service     6.  Acute kidney injury.  Suspect  prerenal issues playing a significant role in the setting of his systemic illness.  -Dose adjusted antibiotics  -Volume management  -Recheck BMP     7.  Reported back pain.  Difficult to do a accurate neurologic exam.  Consideration for the possibly of a spine infection in the setting of bacteremia.  -Follow-up MRI    Antibiotic  Vancomycin 3    I discussed above plan with patient and with primary service who agrees with continuation of IV vancomycin.        Subjective:  No further fevers overnight.  Patient was evaluated by dermatology and multiple punch biopsies and wound cultures were obtained yesterday.    Objective:  Vitals:  Temp:  [98.2 °F (36.8 °C)-99.5 °F (37.5 °C)] 98.6 °F (37 °C)  HR:  [] 85  Resp:  [16-20] 16  BP: ()/(49-87) 134/71  SpO2:  [97 %-100 %] 99 %  Temp (24hrs), Av.8 °F (37.1 °C), Min:98.2 °F (36.8 °C), Max:99.5 °F (37.5 °C)  Current: Temperature: 98.6 °F (37 °C)    Physical Exam:   General:  No acute distress  HEENT atraumatic normocephalic  Neck trachea midline  Psychiatric:  Awake and alert  Pulmonary:  Normal respiratory excursion without accessory muscle use  Abdomen: Nondistended with no rigidity or guarding  Extremities:  No edema  Skin: Diffuse xerosis.  No visible purulence or fluctuance.  Neuro moves all extremities spontaneously    Lab Results:  I have personally reviewed pertinent labs.  Results from last 7 days   Lab Units 24  0541 24  2059 24  1107   POTASSIUM mmol/L 3.8 4.0 4.5   CHLORIDE mmol/L 110* 109* 101   CO2 mmol/L 25 26 23   BUN mg/dL 32* 28* 27*   CREATININE mg/dL 1.78* 1.85* 1.75*   EGFR ml/min/1.73sq m 27 26 28   CALCIUM mg/dL 8.4 8.6 9.8   AST U/L 21  --  34   ALT U/L 15  --  17   ALK PHOS U/L 32*  --  53     Results from last 7 days   Lab Units 24  0541 24  1032 24  1107   WBC Thousand/uL 6.98 7.80 6.77   HEMOGLOBIN g/dL 11.4* 13.5 15.7*   PLATELETS Thousands/uL 176 228 225     Results from last 7 days   Lab Units  06/07/24  1946 06/06/24  1720 06/06/24  1114 06/06/24  1107   BLOOD CULTURE   --   --   --  No Growth at 48 hrs.   GRAM STAIN RESULT  No Polys or Bacteria seen  --  Gram positive cocci in clusters*  --    URINE CULTURE   --  Culture results to follow.  --   --    WOUND CULTURE  Culture too young- will reincubate  No growth  No growth  No growth  Culture too young- will reincubate  Culture too young- will reincubate  Culture too young- will reincubate  --   --   --        Imaging Studies:   I have personally reviewed pertinent imaging study reports and images in PACS.    EKG, Pathology, and Other Studies:   I have personally reviewed pertinent reports.

## 2024-06-08 NOTE — PROGRESS NOTES
Bhavna Al is a 73 y.o. female who is currently ordered Vancomycin IV with management by the Pharmacy Consult service.  Relevant clinical data and objective / subjective history reviewed.  Vancomycin Assessment:  Indication and Goal AUC/Trough: Soft tissue (goal -600, trough >10)  Clinical Status: stable  Micro: Wound culture and MRSA culture no results    Renal Function:  SCr: 1.78 mg/dL  CrCl: 31.5 mL/min  Renal replacement: Not on dialysis  Days of Therapy: 3  Current Dose: 1000mg IV daily prn when level < 15  Vancomycin Plan:  New Dosing: continue current dosing  Estimated AUC: N/A  Estimated Trough: N/A  Next Level: Random 6/9 at 0600  Renal Function Monitoring: Daily BMP and UOP  Pharmacy will continue to follow closely for s/sx of nephrotoxicity, infusion reactions and appropriateness of therapy.  BMP and CBC will be ordered per protocol. We will continue to follow the patient’s culture results and clinical progress daily.

## 2024-06-08 NOTE — PLAN OF CARE
Problem: PAIN - ADULT  Goal: Verbalizes/displays adequate comfort level or baseline comfort level  Description: Interventions:  - Encourage patient to monitor pain and request assistance  - Assess pain using appropriate pain scale  - Administer analgesics based on type and severity of pain and evaluate response  - Implement non-pharmacological measures as appropriate and evaluate response  - Consider cultural and social influences on pain and pain management  - Notify physician/advanced practitioner if interventions unsuccessful or patient reports new pain  Outcome: Progressing     Problem: INFECTION - ADULT  Goal: Absence or prevention of progression during hospitalization  Description: INTERVENTIONS:  - Assess and monitor for signs and symptoms of infection  - Monitor lab/diagnostic results  - Monitor all insertion sites, i.e. indwelling lines, tubes, and drains  - Monitor endotracheal if appropriate and nasal secretions for changes in amount and color  - [place] appropriate cooling/warming therapies per order  - Administer medications as ordered  - Instruct and encourage patient and family to use good hand hygiene technique  - Identify and instruct in appropriate isolation precautions for identified infection/condition  Outcome: Progressing  Goal: Absence of fever/infection during neutropenic period  Description: INTERVENTIONS:  - Monitor WBC    Outcome: Progressing     Problem: SAFETY ADULT  Goal: Patient will remain free of falls  Description: INTERVENTIONS:  - Educate patient/family on patient safety including physical limitations  - Instruct patient to call for assistance with activity   - Consult OT/PT to assist with strengthening/mobility   - Keep Call bell within reach  - Keep bed low and locked with side rails adjusted as appropriate  - Keep care items and personal belongings within reach  - Initiate and maintain comfort rounds  - Make Fall Risk Sign visible to staff  - Apply yellow socks and bracelet  for high fall risk patients  - Consider moving patient to room near nurses station  Outcome: Progressing  Goal: Maintain or return to baseline ADL function  Description: INTERVENTIONS:  -  Assess patient's ability to carry out ADLs; assess patient's baseline for ADL function and identify physical deficits which impact ability to perform ADLs (bathing, care of mouth/teeth, toileting, grooming, dressing, etc.)  - Assess/evaluate cause of self-care deficits   - Assess range of motion  - Assess patient's mobility; develop plan if impaired  - Assess patient's need for assistive devices and provide as appropriate  - Encourage maximum independence but intervene and supervise when necessary  - Involve family in performance of ADLs  - Assess for home care needs following discharge   - Consider OT consult to assist with ADL evaluation and planning for discharge  - Provide patient education as appropriate  Outcome: Progressing  Goal: Maintains/Returns to pre admission functional level  Description: INTERVENTIONS:  - Perform AM-PAC 6 Click Basic Mobility/ Daily Activity assessment daily.  - Set and communicate daily mobility goal to care team and patient/family/caregiver.   - Collaborate with rehabilitation services on mobility goals if consulted  - Out of bed for toileting  - Record patient progress and toleration of activity level   Outcome: Progressing     Problem: DISCHARGE PLANNING  Goal: Discharge to home or other facility with appropriate resources  Description: INTERVENTIONS:  - Identify barriers to discharge w/patient and caregiver  - Arrange for needed discharge resources and transportation as appropriate  - Identify discharge learning needs (meds, wound care, etc.)  - Arrange for interpretive services to assist at discharge as needed  - Refer to Case Management Department for coordinating discharge planning if the patient needs post-hospital services based on physician/advanced practitioner order or complex needs  related to functional status, cognitive ability, or social support system  Outcome: Progressing     Problem: Knowledge Deficit  Goal: Patient/family/caregiver demonstrates understanding of disease process, treatment plan, medications, and discharge instructions  Description: Complete learning assessment and assess knowledge base.  Interventions:  - Provide teaching at level of understanding  - Provide teaching via preferred learning methods  Outcome: Progressing     Problem: Prexisting or High Potential for Compromised Skin Integrity  Goal: Skin integrity is maintained or improved  Description: INTERVENTIONS:  - Identify patients at risk for skin breakdown  - Assess and monitor skin integrity  - Assess and monitor nutrition and hydration status  - Monitor labs   - Assess for incontinence   - Turn and reposition patient  - Assist with mobility/ambulation  - Relieve pressure over bony prominences  - Avoid friction and shearing  - Provide appropriate hygiene as needed including keeping skin clean and dry  - Evaluate need for skin moisturizer/barrier cream  - Collaborate with interdisciplinary team   - Patient/family teaching  - Consider wound care consult   Outcome: Progressing     Problem: Nutrition/Hydration-ADULT  Goal: Nutrient/Hydration intake appropriate for improving, restoring or maintaining nutritional needs  Description: Monitor and assess patient's nutrition/hydration status for malnutrition. Collaborate with interdisciplinary team and initiate plan and interventions as ordered.  Monitor patient's weight and dietary intake as ordered or per policy. Utilize nutrition screening tool and intervene as necessary. Determine patient's food preferences and provide high-protein, high-caloric foods as appropriate.     INTERVENTIONS:  - Monitor oral intake, urinary output, labs, and treatment plans  - Assess nutrition and hydration status and recommend course of action  - Evaluate amount of meals eaten  - Assist  patient with eating if necessary   - Allow adequate time for meals  - Recommend/ encourage appropriate diets, oral nutritional supplements, and vitamin/mineral supplements  - Order, calculate, and assess calorie counts as needed  - Recommend, monitor, and adjust tube feedings and TPN/PPN based on assessed needs  - Assess need for intravenous fluids  - Provide specific nutrition/hydration education as appropriate  - Include patient/family/caregiver in decisions related to nutrition  Outcome: Progressing

## 2024-06-09 ENCOUNTER — APPOINTMENT (OUTPATIENT)
Dept: RADIOLOGY | Facility: HOSPITAL | Age: 74
DRG: 539 | End: 2024-06-09
Payer: MEDICARE

## 2024-06-09 ENCOUNTER — APPOINTMENT (INPATIENT)
Dept: RADIOLOGY | Facility: HOSPITAL | Age: 74
DRG: 539 | End: 2024-06-09
Payer: MEDICARE

## 2024-06-09 PROBLEM — I95.9 HYPOTENSION: Status: ACTIVE | Noted: 2024-06-09

## 2024-06-09 PROBLEM — R78.81 GRAM-POSITIVE BACTEREMIA: Status: ACTIVE | Noted: 2024-06-09

## 2024-06-09 PROBLEM — M46.24 ACUTE OSTEOMYELITIS OF THORACIC SPINE (HCC): Status: ACTIVE | Noted: 2024-06-09

## 2024-06-09 PROBLEM — R13.10 DYSPHAGIA: Status: ACTIVE | Noted: 2024-06-09

## 2024-06-09 LAB
ANION GAP SERPL CALCULATED.3IONS-SCNC: 10 MMOL/L (ref 4–13)
ANION GAP SERPL CALCULATED.3IONS-SCNC: 11 MMOL/L (ref 4–13)
BACTERIA BLD CULT: ABNORMAL
BACTERIA BLD CULT: ABNORMAL
BACTERIA UR CULT: ABNORMAL
BACTERIA WND AEROBE CULT: NORMAL
BACTERIA WND AEROBE CULT: NORMAL
BUN SERPL-MCNC: 34 MG/DL (ref 5–25)
BUN SERPL-MCNC: 36 MG/DL (ref 5–25)
CALCIUM SERPL-MCNC: 8.8 MG/DL (ref 8.4–10.2)
CALCIUM SERPL-MCNC: 8.9 MG/DL (ref 8.4–10.2)
CHLORIDE SERPL-SCNC: 109 MMOL/L (ref 96–108)
CHLORIDE SERPL-SCNC: 112 MMOL/L (ref 96–108)
CO2 SERPL-SCNC: 25 MMOL/L (ref 21–32)
CO2 SERPL-SCNC: 26 MMOL/L (ref 21–32)
CREAT SERPL-MCNC: 1.4 MG/DL (ref 0.6–1.3)
CREAT SERPL-MCNC: 1.5 MG/DL (ref 0.6–1.3)
ERYTHROCYTE [DISTWIDTH] IN BLOOD BY AUTOMATED COUNT: 14.8 % (ref 11.6–15.1)
GFR SERPL CREATININE-BSD FRML MDRD: 33 ML/MIN/1.73SQ M
GFR SERPL CREATININE-BSD FRML MDRD: 36 ML/MIN/1.73SQ M
GLUCOSE SERPL-MCNC: 107 MG/DL (ref 65–140)
GLUCOSE SERPL-MCNC: 96 MG/DL (ref 65–140)
GRAM STN SPEC: ABNORMAL
GRAM STN SPEC: NORMAL
HCT VFR BLD AUTO: 35.5 % (ref 34.8–46.1)
HGB BLD-MCNC: 10.9 G/DL (ref 11.5–15.4)
INTERNAL QC RESULT: ABNORMAL
MAGNESIUM SERPL-MCNC: 2 MG/DL (ref 1.9–2.7)
MCH RBC QN AUTO: 28.1 PG (ref 26.8–34.3)
MCHC RBC AUTO-ENTMCNC: 30.7 G/DL (ref 31.4–37.4)
MCV RBC AUTO: 92 FL (ref 82–98)
MECA+MECC ISLT/SPM QL: DETECTED
MECA+MECC+MREJ ISLT/SPM QL: DETECTED
PLATELET # BLD AUTO: 171 THOUSANDS/UL (ref 149–390)
PMV BLD AUTO: 11.9 FL (ref 8.9–12.7)
POTASSIUM SERPL-SCNC: 3.3 MMOL/L (ref 3.5–5.3)
POTASSIUM SERPL-SCNC: 3.7 MMOL/L (ref 3.5–5.3)
RBC # BLD AUTO: 3.88 MILLION/UL (ref 3.81–5.12)
S AUREUS DNA BLD POS QL NAA+NON-PROBE: DETECTED
S EPIDERMIDIS DNA BLD POS QL NAA+NON-PRB: DETECTED
SODIUM SERPL-SCNC: 145 MMOL/L (ref 135–147)
SODIUM SERPL-SCNC: 148 MMOL/L (ref 135–147)
VANCOMYCIN TROUGH SERPL-MCNC: 17.3 UG/ML (ref 10–20)
WBC # BLD AUTO: 6.94 THOUSAND/UL (ref 4.31–10.16)

## 2024-06-09 PROCEDURE — 99233 SBSQ HOSP IP/OBS HIGH 50: CPT | Performed by: INTERNAL MEDICINE

## 2024-06-09 PROCEDURE — C9113 INJ PANTOPRAZOLE SODIUM, VIA: HCPCS | Performed by: HOSPITALIST

## 2024-06-09 PROCEDURE — 99223 1ST HOSP IP/OBS HIGH 75: CPT | Performed by: PHYSICIAN ASSISTANT

## 2024-06-09 PROCEDURE — 72146 MRI CHEST SPINE W/O DYE: CPT

## 2024-06-09 PROCEDURE — 80048 BASIC METABOLIC PNL TOTAL CA: CPT | Performed by: INTERNAL MEDICINE

## 2024-06-09 PROCEDURE — 72148 MRI LUMBAR SPINE W/O DYE: CPT

## 2024-06-09 PROCEDURE — 87040 BLOOD CULTURE FOR BACTERIA: CPT | Performed by: INTERNAL MEDICINE

## 2024-06-09 PROCEDURE — 85027 COMPLETE CBC AUTOMATED: CPT | Performed by: FAMILY MEDICINE

## 2024-06-09 PROCEDURE — 80048 BASIC METABOLIC PNL TOTAL CA: CPT | Performed by: FAMILY MEDICINE

## 2024-06-09 PROCEDURE — 83735 ASSAY OF MAGNESIUM: CPT | Performed by: INTERNAL MEDICINE

## 2024-06-09 PROCEDURE — 80202 ASSAY OF VANCOMYCIN: CPT | Performed by: INTERNAL MEDICINE

## 2024-06-09 PROCEDURE — 92526 ORAL FUNCTION THERAPY: CPT

## 2024-06-09 RX ORDER — LINEZOLID 600 MG/1
600 TABLET, FILM COATED ORAL EVERY 12 HOURS SCHEDULED
Status: DISCONTINUED | OUTPATIENT
Start: 2024-06-09 | End: 2024-06-12

## 2024-06-09 RX ORDER — POTASSIUM CHLORIDE 14.9 MG/ML
20 INJECTION INTRAVENOUS ONCE
Status: COMPLETED | OUTPATIENT
Start: 2024-06-09 | End: 2024-06-09

## 2024-06-09 RX ORDER — SODIUM CHLORIDE, SODIUM GLUCONATE, SODIUM ACETATE, POTASSIUM CHLORIDE, MAGNESIUM CHLORIDE, SODIUM PHOSPHATE, DIBASIC, AND POTASSIUM PHOSPHATE .53; .5; .37; .037; .03; .012; .00082 G/100ML; G/100ML; G/100ML; G/100ML; G/100ML; G/100ML; G/100ML
50 INJECTION, SOLUTION INTRAVENOUS CONTINUOUS
Status: DISCONTINUED | OUTPATIENT
Start: 2024-06-09 | End: 2024-06-11

## 2024-06-09 RX ADMIN — MICONAZOLE NITRATE: 20 CREAM TOPICAL at 12:14

## 2024-06-09 RX ADMIN — METOROPROLOL TARTRATE 2.5 MG: 5 INJECTION, SOLUTION INTRAVENOUS at 14:31

## 2024-06-09 RX ADMIN — LINEZOLID 600 MG: 600 TABLET, FILM COATED ORAL at 12:14

## 2024-06-09 RX ADMIN — NYSTATIN: 100000 POWDER TOPICAL at 21:27

## 2024-06-09 RX ADMIN — METOROPROLOL TARTRATE 2.5 MG: 5 INJECTION, SOLUTION INTRAVENOUS at 02:08

## 2024-06-09 RX ADMIN — TRIAMCINOLONE ACETONIDE: 1 CREAM TOPICAL at 17:33

## 2024-06-09 RX ADMIN — HYDROCORTISONE SODIUM SUCCINATE 100 MG: 100 INJECTION, POWDER, FOR SOLUTION INTRAMUSCULAR; INTRAVENOUS at 14:31

## 2024-06-09 RX ADMIN — HYDROXYCHLOROQUINE SULFATE 200 MG: 200 TABLET ORAL at 10:39

## 2024-06-09 RX ADMIN — HYDROXYCHLOROQUINE SULFATE 200 MG: 200 TABLET ORAL at 17:40

## 2024-06-09 RX ADMIN — CLOBETASOL PROPIONATE: 0.5 CREAM TOPICAL at 17:30

## 2024-06-09 RX ADMIN — LINEZOLID 600 MG: 600 TABLET, FILM COATED ORAL at 21:27

## 2024-06-09 RX ADMIN — NYSTATIN: 100000 POWDER TOPICAL at 12:14

## 2024-06-09 RX ADMIN — PANTOPRAZOLE SODIUM 40 MG: 40 INJECTION, POWDER, FOR SOLUTION INTRAVENOUS at 10:38

## 2024-06-09 RX ADMIN — APIXABAN 5 MG: 5 TABLET, FILM COATED ORAL at 23:06

## 2024-06-09 RX ADMIN — Medication 1000 UNITS: at 10:39

## 2024-06-09 RX ADMIN — HYDROCORTISONE SODIUM SUCCINATE 100 MG: 100 INJECTION, POWDER, FOR SOLUTION INTRAMUSCULAR; INTRAVENOUS at 21:27

## 2024-06-09 RX ADMIN — TRIAMCINOLONE ACETONIDE: 1 CREAM TOPICAL at 12:14

## 2024-06-09 RX ADMIN — SODIUM CHLORIDE, SODIUM GLUCONATE, SODIUM ACETATE, POTASSIUM CHLORIDE, MAGNESIUM CHLORIDE, SODIUM PHOSPHATE, DIBASIC, AND POTASSIUM PHOSPHATE 50 ML/HR: .53; .5; .37; .037; .03; .012; .00082 INJECTION, SOLUTION INTRAVENOUS at 10:58

## 2024-06-09 RX ADMIN — POTASSIUM CHLORIDE 20 MEQ: 14.9 INJECTION, SOLUTION INTRAVENOUS at 10:45

## 2024-06-09 RX ADMIN — HYDROCORTISONE SODIUM SUCCINATE 100 MG: 100 INJECTION, POWDER, FOR SOLUTION INTRAMUSCULAR; INTRAVENOUS at 06:32

## 2024-06-09 RX ADMIN — CLOBETASOL PROPIONATE: 0.5 CREAM TOPICAL at 12:14

## 2024-06-09 RX ADMIN — MICONAZOLE NITRATE: 20 CREAM TOPICAL at 17:30

## 2024-06-09 NOTE — PROGRESS NOTES
Notified by radiology that patient was unable to complete xrays of spine as ordered due to inability to sit upright. Will pass along to next shift for neurosurgery tomorrow.

## 2024-06-09 NOTE — PLAN OF CARE
Problem: PAIN - ADULT  Goal: Verbalizes/displays adequate comfort level or baseline comfort level  Description: Interventions:  - Encourage patient to monitor pain and request assistance  - Assess pain using appropriate pain scale  - Administer analgesics based on type and severity of pain and evaluate response  - Implement non-pharmacological measures as appropriate and evaluate response  - Consider cultural and social influences on pain and pain management  - Notify physician/advanced practitioner if interventions unsuccessful or patient reports new pain  Outcome: Progressing     Problem: INFECTION - ADULT  Goal: Absence or prevention of progression during hospitalization  Description: INTERVENTIONS:  - Assess and monitor for signs and symptoms of infection  - Monitor lab/diagnostic results  - Monitor all insertion sites, i.e. indwelling lines, tubes, and drains  - Monitor endotracheal if appropriate and nasal secretions for changes in amount and color  - [place] appropriate cooling/warming therapies per order  - Administer medications as ordered  - Instruct and encourage patient and family to use good hand hygiene technique  - Identify and instruct in appropriate isolation precautions for identified infection/condition  Outcome: Progressing  Goal: Absence of fever/infection during neutropenic period  Description: INTERVENTIONS:  - Monitor WBC    Outcome: Progressing     Problem: SAFETY ADULT  Goal: Patient will remain free of falls  Description: INTERVENTIONS:  - Educate patient/family on patient safety including physical limitations  - Instruct patient to call for assistance with activity   - Consult OT/PT to assist with strengthening/mobility   - Keep Call bell within reach  - Keep bed low and locked with side rails adjusted as appropriate  - Keep care items and personal belongings within reach  - Initiate and maintain comfort rounds  - Make Fall Risk Sign visible to staff  - Apply yellow socks and bracelet  for high fall risk patients  - Consider moving patient to room near nurses station  Outcome: Progressing  Goal: Maintain or return to baseline ADL function  Description: INTERVENTIONS:  -  Assess patient's ability to carry out ADLs; assess patient's baseline for ADL function and identify physical deficits which impact ability to perform ADLs (bathing, care of mouth/teeth, toileting, grooming, dressing, etc.)  - Assess/evaluate cause of self-care deficits   - Assess range of motion  - Assess patient's mobility; develop plan if impaired  - Assess patient's need for assistive devices and provide as appropriate  - Encourage maximum independence but intervene and supervise when necessary  - Involve family in performance of ADLs  - Assess for home care needs following discharge   - Consider OT consult to assist with ADL evaluation and planning for discharge  - Provide patient education as appropriate  Outcome: Progressing  Goal: Maintains/Returns to pre admission functional level  Description: INTERVENTIONS:  - Perform AM-PAC 6 Click Basic Mobility/ Daily Activity assessment daily.  - Set and communicate daily mobility goal to care team and patient/family/caregiver.   - Collaborate with rehabilitation services on mobility goals if consulted  - Out of bed for toileting  - Record patient progress and toleration of activity level   Outcome: Progressing     Problem: DISCHARGE PLANNING  Goal: Discharge to home or other facility with appropriate resources  Description: INTERVENTIONS:  - Identify barriers to discharge w/patient and caregiver  - Arrange for needed discharge resources and transportation as appropriate  - Identify discharge learning needs (meds, wound care, etc.)  - Arrange for interpretive services to assist at discharge as needed  - Refer to Case Management Department for coordinating discharge planning if the patient needs post-hospital services based on physician/advanced practitioner order or complex needs  related to functional status, cognitive ability, or social support system  Outcome: Progressing     Problem: Knowledge Deficit  Goal: Patient/family/caregiver demonstrates understanding of disease process, treatment plan, medications, and discharge instructions  Description: Complete learning assessment and assess knowledge base.  Interventions:  - Provide teaching at level of understanding  - Provide teaching via preferred learning methods  Outcome: Progressing     Problem: Prexisting or High Potential for Compromised Skin Integrity  Goal: Skin integrity is maintained or improved  Description: INTERVENTIONS:  - Identify patients at risk for skin breakdown  - Assess and monitor skin integrity  - Assess and monitor nutrition and hydration status  - Monitor labs   - Assess for incontinence   - Turn and reposition patient  - Assist with mobility/ambulation  - Relieve pressure over bony prominences  - Avoid friction and shearing  - Provide appropriate hygiene as needed including keeping skin clean and dry  - Evaluate need for skin moisturizer/barrier cream  - Collaborate with interdisciplinary team   - Patient/family teaching  - Consider wound care consult   Outcome: Progressing     Problem: Nutrition/Hydration-ADULT  Goal: Nutrient/Hydration intake appropriate for improving, restoring or maintaining nutritional needs  Description: Monitor and assess patient's nutrition/hydration status for malnutrition. Collaborate with interdisciplinary team and initiate plan and interventions as ordered.  Monitor patient's weight and dietary intake as ordered or per policy. Utilize nutrition screening tool and intervene as necessary. Determine patient's food preferences and provide high-protein, high-caloric foods as appropriate.     INTERVENTIONS:  - Monitor oral intake, urinary output, labs, and treatment plans  - Assess nutrition and hydration status and recommend course of action  - Evaluate amount of meals eaten  - Assist  patient with eating if necessary   - Allow adequate time for meals  - Recommend/ encourage appropriate diets, oral nutritional supplements, and vitamin/mineral supplements  - Order, calculate, and assess calorie counts as needed  - Recommend, monitor, and adjust tube feedings and TPN/PPN based on assessed needs  - Assess need for intravenous fluids  - Provide specific nutrition/hydration education as appropriate  - Include patient/family/caregiver in decisions related to nutrition  Outcome: Progressing

## 2024-06-09 NOTE — ASSESSMENT & PLAN NOTE
home Lopressor 25 mg twice daily  Switch to IV lopressor here  Speech evaluation pending.  Will change to p.o. after speech evaluation

## 2024-06-09 NOTE — ASSESSMENT & PLAN NOTE
Presumed thoracic osteomyelitis at T9-10 in the setting of gram-positive bacteremia  Initially presented on 6/6/2024 with lethargy, poor oral intake  Of note has extensive dermatological issues including staph scalded skin syndrome which required prolonged IV antibiotics    Imaging:  MRI thoracic spine without contrast 6/8/2024: Findings suspicious for discitis/osteomyelitis at T9-10.  No epidural collection.  Scoliosis and degenerative changes.    Plan:  Imaging reviewed, concerning for discitis/osteomyelitis of the thoracic spine although it is a noncontrasted study likely secondary to AMANDA.  Thankfully there is no epidural collection.  Would recommend conservative management.  ID following, appreciate recommendations  Will defer bracing at this time.  Patient has extensive dermatological issues and would not want to interfere with this.  Also given her body habitus and bedbound status it will likely not contribute much.  Will attempt baseline upright thoracic spine x-rays, can be completed sitting up in bed  Continue to monitor neuro exam  Medical management and pain control per primary team  DVT ppx:  SCDs, okay for pharmacological DVT prophylaxis  Mobilize as tolerated with assistance, PT / OT evaluation    Neurosurgery will review imaging once completed.  Please call with questions or concerns.

## 2024-06-09 NOTE — SPEECH THERAPY NOTE
Speech Language/Pathology    Speech/Language Pathology Progress Note    Patient Name: Bhavna Al  Today's Date: 2024     Problem List  Principal Problem:    Lethargy  Active Problems:    Rheumatoid arthritis (HCC)    GERD (gastroesophageal reflux disease)    Essential hypertension    Osteoporosis    Vitamin D deficiency    Hyperparathyroidism (HCC)    Acute kidney injury superimposed on CKD  (HCC)    Paroxysmal atrial fibrillation (HCC)    Psoriasis    Chronic diastolic heart failure (HCC)    Bacteremia       Past Medical History  Past Medical History:   Diagnosis Date    Abnormal thyroid function test     last assessed: 2015     Arthritis     C[place]     last assessed: 2016     Continuous opioid dependence (HCC) 2021    Edema of right lower extremity     last assessed: 2015     GERD (gastroesophageal reflux disease)     Hypertension     Medicare annual wellness visit, subsequent 2021    Positive blood culture 3/11/2022    Sarcoid         Past Surgical History  Past Surgical History:   Procedure Laterality Date     SECTION      IR NON-TUNNELED CENTRAL LINE PLACEMENT  2024    IR PICC PLACEMENT SINGLE LUMEN  2024    MULTIPLE TOOTH EXTRACTIONS N/A 2016    Procedure: Surgical extraction of teeth 2, 18, 19, 30, 31; incision and drainage of left subperiosteal abscess ;  Surgeon: Clara Cevallos DMD;  Location: [facility];  Service:          Subjective:  Called by RN as pt was more awake and alert, asking for PO. Medical team upgraded diet to puree/NTL pending SLP eval (was NPO prior). Pt sleeping upon arrival but easily aroused with verbal and light tactile stim. Voice strong, lips/face remain dry from psoriasis per medical team progress notes.     Objective:  Pt trialed limited amounts of puree, NTL straw, thin straw, and hard solids 2/2 not liking several items on tray and that SLP brought in (does not like vanilla, apple juice, cream of wheat, prefers  OJ). Pt with appropriate draw from straw and suspected control with both thin and NTL. Appropriate oral stage with limited puree trials as well. Mastication was mildly prolonged with some fatigue, but functional even when given larger bites. No overt s/s of aspiration across consistencies/trials. Rx upgrade as described below.     Assessment:  Improving oropharyngeal swallow function, mild deficits characterized by prolonged mastication and slow rate of intake. Dry flaking skin on lips did not appear to impact oral stage, pt without complaints.    Plan/Recommendations:  -Upgrade to level 3 dental soft/advanced with thin liquids  -Upright for meals, feeding assist/supervision   -SLP to follow for ongoing assessment of tolerance/upgrade as appropriate

## 2024-06-09 NOTE — ASSESSMENT & PLAN NOTE
Lab Results   Component Value Date    EGFR 27 06/08/2024    EGFR 26 06/07/2024    EGFR 28 06/06/2024    CREATININE 1.78 (H) 06/08/2024    CREATININE 1.85 (H) 06/07/2024    CREATININE 1.75 (H) 06/06/2024     CKD 3 at baseline with baseline Cr 0.8-1.0  AMANDA on admission CR 1.75  Likely prerenal azotemia due to reduced p.o. intake for several days prior to admission  Cont IVF, hold diuretic  Daily BMP - difficulty with blood draw  Avoid further nephrotoxic agents  Holding home Lasix given AMANDA, monitor volume status closely  Strict I's/O

## 2024-06-09 NOTE — ASSESSMENT & PLAN NOTE
Not on medications at home  Monitor Ca and P levels inpatient  No need for Nephro consult at this time

## 2024-06-09 NOTE — ASSESSMENT & PLAN NOTE
Wt Readings from Last 3 Encounters:   06/08/24 103 kg (228 lb)   06/08/24 103 kg (228 lb)   02/19/24 113 kg (249 lb)     Last EF 65% in 1/2024 although poor windows; prior TTE in 11/2022 with EF 55%  On Lopressor 25mg BID and Lasix 40mg daily at home  Continue home BB  Holding home lasix with concern for prerenal AMANDA in setting of reduced PO intake  Strict I/O  Daily standing weights  Restart Lasix when euvolemic

## 2024-06-09 NOTE — PROGRESS NOTES
Bhavna Al is a 73 y.o. female who is currently ordered Vancomycin IV with management by the Pharmacy Consult service.  Relevant clinical data and objective / subjective history reviewed.  Vancomycin Assessment:  Indication and Goal AUC/Trough: Soft tissue (goal -600, trough >10)  Clinical Status: stable  Micro: Wound cultures show 3+ growth of staph aureus; urine culture abnormal for e. Coli and pseudomonas aeruginosa; MRSA culture no results    Renal Function:  SCr: 1.5 mg/dL  CrCl: 37.3 mL/min  Renal replacement: Not on dialysis  Days of Therapy: 4  Current Dose: 1000mg IV daily prn when level < 15  Vancomycin Plan:  New Dosing: continue current dosing  Estimated AUC: N/A  Estimated Trough: N/A  Next Level: Random 6/10 at 0600  Renal Function Monitoring: Daily BMP and UOP  Pharmacy will continue to follow closely for s/sx of nephrotoxicity, infusion reactions and appropriateness of therapy.  BMP and CBC will be ordered per protocol. We will continue to follow the patient’s culture results and clinical progress daily.

## 2024-06-09 NOTE — PLAN OF CARE
Problem: PAIN - ADULT  Goal: Verbalizes/displays adequate comfort level or baseline comfort level  Description: Interventions:  - Encourage patient to monitor pain and request assistance  - Assess pain using appropriate pain scale  - Administer analgesics based on type and severity of pain and evaluate response  - Implement non-pharmacological measures as appropriate and evaluate response  - Consider cultural and social influences on pain and pain management  - Notify physician/advanced practitioner if interventions unsuccessful or patient reports new pain  Outcome: Progressing     Problem: INFECTION - ADULT  Goal: Absence or prevention of progression during hospitalization  Description: INTERVENTIONS:  - Assess and monitor for signs and symptoms of infection  - Monitor lab/diagnostic results  - Monitor all insertion sites, i.e. indwelling lines, tubes, and drains  - Monitor endotracheal if appropriate and nasal secretions for changes in amount and color  - [place] appropriate cooling/warming therapies per order  - Administer medications as ordered  - Instruct and encourage patient and family to use good hand hygiene technique  - Identify and instruct in appropriate isolation precautions for identified infection/condition  Outcome: Progressing  Goal: Absence of fever/infection during neutropenic period  Description: INTERVENTIONS:  - Monitor WBC    Outcome: Progressing     Problem: SAFETY ADULT  Goal: Patient will remain free of falls  Description: INTERVENTIONS:  - Educate patient/family on patient safety including physical limitations  - Instruct patient to call for assistance with activity   - Consult OT/PT to assist with strengthening/mobility   - Keep Call bell within reach  - Keep bed low and locked with side rails adjusted as appropriate  - Keep care items and personal belongings within reach  - Initiate and maintain comfort rounds  - Make Fall Risk Sign visible to staff  - Apply yellow socks and bracelet  for high fall risk patients  - Consider moving patient to room near nurses station  Outcome: Progressing  Goal: Maintain or return to baseline ADL function  Description: INTERVENTIONS:  -  Assess patient's ability to carry out ADLs; assess patient's baseline for ADL function and identify physical deficits which impact ability to perform ADLs (bathing, care of mouth/teeth, toileting, grooming, dressing, etc.)  - Assess/evaluate cause of self-care deficits   - Assess range of motion  - Assess patient's mobility; develop plan if impaired  - Assess patient's need for assistive devices and provide as appropriate  - Encourage maximum independence but intervene and supervise when necessary  - Involve family in performance of ADLs  - Assess for home care needs following discharge   - Consider OT consult to assist with ADL evaluation and planning for discharge  - Provide patient education as appropriate  Outcome: Progressing  Goal: Maintains/Returns to pre admission functional level  Description: INTERVENTIONS:  - Perform AM-PAC 6 Click Basic Mobility/ Daily Activity assessment daily.  - Set and communicate daily mobility goal to care team and patient/family/caregiver.   - Collaborate with rehabilitation services on mobility goals if consulted  - Out of bed for toileting  - Record patient progress and toleration of activity level   Outcome: Progressing     Problem: DISCHARGE PLANNING  Goal: Discharge to home or other facility with appropriate resources  Description: INTERVENTIONS:  - Identify barriers to discharge w/patient and caregiver  - Arrange for needed discharge resources and transportation as appropriate  - Identify discharge learning needs (meds, wound care, etc.)  - Arrange for interpretive services to assist at discharge as needed  - Refer to Case Management Department for coordinating discharge planning if the patient needs post-hospital services based on physician/advanced practitioner order or complex needs  related to functional status, cognitive ability, or social support system  Outcome: Progressing     Problem: Knowledge Deficit  Goal: Patient/family/caregiver demonstrates understanding of disease process, treatment plan, medications, and discharge instructions  Description: Complete learning assessment and assess knowledge base.  Interventions:  - Provide teaching at level of understanding  - Provide teaching via preferred learning methods  Outcome: Progressing     Problem: Prexisting or High Potential for Compromised Skin Integrity  Goal: Skin integrity is maintained or improved  Description: INTERVENTIONS:  - Identify patients at risk for skin breakdown  - Assess and monitor skin integrity  - Assess and monitor nutrition and hydration status  - Monitor labs   - Assess for incontinence   - Turn and reposition patient  - Assist with mobility/ambulation  - Relieve pressure over bony prominences  - Avoid friction and shearing  - Provide appropriate hygiene as needed including keeping skin clean and dry  - Evaluate need for skin moisturizer/barrier cream  - Collaborate with interdisciplinary team   - Patient/family teaching  - Consider wound care consult   Outcome: Progressing     Problem: Nutrition/Hydration-ADULT  Goal: Nutrient/Hydration intake appropriate for improving, restoring or maintaining nutritional needs  Description: Monitor and assess patient's nutrition/hydration status for malnutrition. Collaborate with interdisciplinary team and initiate plan and interventions as ordered.  Monitor patient's weight and dietary intake as ordered or per policy. Utilize nutrition screening tool and intervene as necessary. Determine patient's food preferences and provide high-protein, high-caloric foods as appropriate.     INTERVENTIONS:  - Monitor oral intake, urinary output, labs, and treatment plans  - Assess nutrition and hydration status and recommend course of action  - Evaluate amount of meals eaten  - Assist  patient with eating if necessary   - Allow adequate time for meals  - Recommend/ encourage appropriate diets, oral nutritional supplements, and vitamin/mineral supplements  - Order, calculate, and assess calorie counts as needed  - Recommend, monitor, and adjust tube feedings and TPN/PPN based on assessed needs  - Assess need for intravenous fluids  - Provide specific nutrition/hydration education as appropriate  - Include patient/family/caregiver in decisions related to nutrition  Outcome: Progressing

## 2024-06-09 NOTE — ASSESSMENT & PLAN NOTE
Dermatology following  History of psoriasis with staph scalded skin syndrome requiring prolonged IV antibiotics  Currently on IV vancomycin but also recommending initiation of linezolid to assist in toxin clearance

## 2024-06-09 NOTE — PROGRESS NOTES
Progress Note - Infectious Disease   Bhavna Al 73 y.o. female MRN: [id number]  Unit/Bed#: [facility] 820-01 Encounter: [id number]      Assessment/plan:  1.  Gram-positive bacteremia.  With MRSA and coagulase-negative Staphylococcus isolated in 1 out of 2 blood cultures.  While this could be a contaminant with only 1 out of 2 sets positive, in the setting of clinical sepsis and significant cutaneous breakdown, more likely a true bacteremia.  Appears to be complicated by thoracic spine infection, as below.  Fortunately the patient remains hemodynamically stable.  Fevers have improved.  -Continue IV vancomycin  -Pharmacy follow-up for vancomycin dose management  -Recheck blood cultures to confirm clearance of bacteremia.  Cultures are ordered but not yet collected.  -Follow-up echocardiogram report     2.  Systemic inflammatory response syndrome.  With fever and tachycardia.  Suspect secondary to the gram-positive bacteremia.  Consideration for the possibly of other etiologies such as adrenal insufficiency.  Fevers have improved.  Hemodynamics are stable.  -Antibiotics as above  -Recheck CBC with differential and BMP to make sure no developing toxicities  -Steroid per primary service  -Supportive care     3.  Acute encephalopathy.  Patient quite lethargic over the last 3 to 4 days.  Suspect all related to the patient's primary process as above.  Remains hemodynamically stable.  No acute findings on brain MRI.  -Monitor cognition  -Management as above     4.  Psoriasis, with multiple wounds.  With a recent diagnosis of staph scalded skin syndrome.  Not well-controlled regardless.  Dermatology input appreciated.  Status post punch biopsy.  Multiple wound cultures were also obtained which are now isolating Staph aureus.  Discussed with dermatology, concern for possibility of SSSS.  -Continue vancomycin, as above  -Add linezolid  -Follow-up final wound cultures  -Follow-up pathology  -Dermatology follow-up    5.  T9-10  discitis/vertebral osteomyelitis.  MRI without epidural collection.  Neurosurgery input noted.  -Antibiotic plan as above  -Neurosurgery follow-up     6.  Rheumatoid arthritis.  On hydroxychloroquine and prednisone.  Risk factor for relative adrenal insufficiency.  -Steroid per primary service     7.  Acute kidney injury.  Suspect prerenal issues playing a significant role in the setting of his systemic illness.  -Dose adjusted antibiotics  -Volume management  -Recheck BMP        Antibiotic  Vancomycin 4    I discussed the above plan with primary service and with dermatology service who agree with addition of linezolid.        Subjective:  Patient underwent MRIs.  No further fevers.  She was reevaluated by speech.  No other acute events.    Objective:  Vitals:  Temp:  [97.4 °F (36.3 °C)-98.1 °F (36.7 °C)] 97.6 °F (36.4 °C)  HR:  [] 92  Resp:  [16-22] 16  BP: ()/(61-90) 126/90  SpO2:  [97 %-100 %] 100 %  Temp (24hrs), Av.8 °F (36.6 °C), Min:97.4 °F (36.3 °C), Max:98.1 °F (36.7 °C)  Current: Temperature: 97.6 °F (36.4 °C)    Physical Exam:   General:  No acute distress, chronically debilitated, no apparent acute distress  HEENT atraumatic normocephalic  Neck trachea midline  Psychiatric:  Awake and alert  Pulmonary:  Normal respiratory excursion without accessory muscle use  Abdomen: Nondistended with no rigidity or guarding  Extremities:  No edema  Skin: Diffuse xerosis.  No visible purulence or fluctuance.  Neuro moves all extremities spontaneously    Lab Results:  I have personally reviewed pertinent labs.  Results from last 7 days   Lab Units 24  0632 24  0541 24  1107   POTASSIUM mmol/L 3.3* 3.8 4.0 4.5   CHLORIDE mmol/L 112* 110* 109* 101   CO2 mmol/L 26 25 26 23   BUN mg/dL 34* 32* 28* 27*   CREATININE mg/dL 1.50* 1.78* 1.85* 1.75*   EGFR ml/min/1.73sq m 33 27 26 28   CALCIUM mg/dL 8.8 8.4 8.6 9.8   AST U/L  --  21  --  34   ALT U/L  --  15  --  17   ALK PHOS U/L   --  32*  --  53     Results from last 7 days   Lab Units 06/09/24  0823 06/08/24  0541 06/07/24  1032   WBC Thousand/uL 6.94 6.98 7.80   HEMOGLOBIN g/dL 10.9* 11.4* 13.5   PLATELETS Thousands/uL 171 176 228     Results from last 7 days   Lab Units 06/07/24  1946 06/07/24  0654 06/06/24  1720 06/06/24  1114 06/06/24  1107   BLOOD CULTURE   --   --   --  Methicillin Resistant Staphylococcus aureus*  Staphylococcus epidermidis* No Growth at 48 hrs.   GRAM STAIN RESULT  Rare Gram positive cocci in pairs*  Rare Gram negative rods*  Rare Epithelial Cells*  No polys seen*  Rare Gram negative rods*  No polys seen*  No Polys or Bacteria seen  No Polys or Bacteria seen  No Polys or Bacteria seen  No Polys or Bacteria seen  --   --  Gram positive cocci in clusters*  --    URINE CULTURE   --   --  20,000-29,000 cfu/ml Escherichia coli*  <10,000 cfu/ml Escherichia coli*  <10,000 cfu/ml Pseudomonas aeruginosa MDR*  --   --    WOUND CULTURE  1+ Growth of Staphylococcus aureus*  2+ Growth of  2+ Growth of Staphylococcus aureus*  2+ Growth of  Few Colonies of Staphylococcus aureus*  1+ Growth of  1+ Growth of  Few Colonies of  1+ Growth of Staphylococcus aureus*  1+ Growth of  3+ Growth of Staphylococcus aureus*  3+ Growth of  --   --   --   --    MRSA CULTURE ONLY   --  Culture results to follow.  --   --   --        Imaging Studies:   I have personally reviewed pertinent imaging study reports and images in PACS.    MRI thoracic spine with suspicion for discitis/osteomyelitis at T9-10.  No epidural collection.    EKG, Pathology, and Other Studies:   I have personally reviewed pertinent reports.

## 2024-06-09 NOTE — ASSESSMENT & PLAN NOTE
Follows with rheumatology outpatient  Continue home hydroxychloroquine 200 mg twice daily and prednisone 5 mg twice daily -> switched steroid to IV given lethargy     No

## 2024-06-09 NOTE — CONSULTS
[facility]  Consult  Name: Bhavna Al 73 y.o. female I MRN: [id number]  [facility] Date of Admission: 6/6/2024   Date of Service: 6/9/2024 I Hospital Day: 3    Inpatient consult to Neurosurgery  Consult performed by: Lady Clinton PA-C  Consult ordered by: Cathy Ribeiro MD          Assessment & Plan   Acute osteomyelitis of thoracic spine (HCC)  Assessment & Plan  Presumed thoracic osteomyelitis at T9-10 in the setting of gram-positive bacteremia  Initially presented on 6/6/2024 with lethargy, poor oral intake  Of note has extensive dermatological issues including staph scalded skin syndrome which required prolonged IV antibiotics    Imaging:  MRI thoracic spine without contrast 6/8/2024: Findings suspicious for discitis/osteomyelitis at T9-10.  No epidural collection.  Scoliosis and degenerative changes.    Plan:  Imaging reviewed, concerning for discitis/osteomyelitis of the thoracic spine although it is a noncontrasted study likely secondary to AMANDA.  Thankfully there is no epidural collection.  Would recommend conservative management.  ID following, appreciate recommendations  Will defer bracing at this time.  Patient has extensive dermatological issues and would not want to interfere with this.  Also given her body habitus and bedbound status it will likely not contribute much.  Will attempt baseline upright thoracic spine x-rays, can be completed sitting up in bed  Continue to monitor neuro exam  Medical management and pain control per primary team  DVT ppx:  SCDs, okay for pharmacological DVT prophylaxis  Mobilize as tolerated with assistance, PT / OT evaluation    Neurosurgery will review imaging once completed.  Please call with questions or concerns.      Bacteremia  Assessment & Plan  ID following  Patient has gram-positive bacteremia with MRSA and coagulase-negative Staphylococcus isolated and 1 out of 2 blood cultures  Continues on IV  vancomycin  Given evidence of osteomyelitis will likely need prolonged course    Psoriasis  Assessment & Plan  Dermatology following  History of psoriasis with staph scalded skin syndrome requiring prolonged IV antibiotics  Currently on IV vancomycin but also recommending initiation of linezolid to assist in toxin clearance         History of Present Illness   HPI: Bhavna Al is a 73 y.o. year old female with PMH including hypertension, sarcoidosis, psoriasis, staph scalded skin syndrome with recent admission in January requiring prolonged IV antibiotics secondary to bacteremia, bedbound at baseline, osteoporosis, hypothyroidism who presented initially on 2024 secondary to generalized weakness, decreased appetite and worsening back pain.  During workup she was found to have bacteremia for which she has been started on antibiotics.  Dermatology is following her for her desquamative rash diffusely noted on her body.  Given her back pain complaints she underwent MRI imaging which demonstrated possible T9-10 osteomyelitis without epidural collection.  Neurosurgery consulted for management of osteomyelitis.  Patient is currently confused at this time and unable to provide adequate history.      Review of Systems   Unable to perform ROS: Mental status change       Historical Information   Past Medical History:   Diagnosis Date    Abnormal thyroid function test     last assessed: 2015     Arthritis     Caries     last assessed: 2016     Continuous opioid dependence (HCC) 2021    Edema of right lower extremity     last assessed: 2015     GERD (gastroesophageal reflux disease)     Hypertension     Medicare annual wellness visit, subsequent 2021    Positive blood culture 3/11/2022    Sarcoid      Past Surgical History:   Procedure Laterality Date     SECTION      IR NON-TUNNELED CENTRAL LINE PLACEMENT  2024    IR PICC PLACEMENT SINGLE LUMEN  2024    MULTIPLE TOOTH  EXTRACTIONS N/A 8/27/2016    Procedure: Surgical extraction of teeth 2, 18, 19, 30, 31; incision and drainage of left subperiosteal abscess ;  Surgeon: Clara Cevallos DMD;  Location: [facility];  Service:      Social History     Substance and Sexual Activity   Alcohol Use Not Currently     Social History     Substance and Sexual Activity   Drug Use No     Social History     Tobacco Use   Smoking Status Never   Smokeless Tobacco Never     Family History   Problem Relation Age of Onset    Asthma Mother     Stroke Mother     No Known Problems Father     No Known Problems Sister     No Known Problems Brother     No Known Problems Maternal Grandmother     No Known Problems Maternal Grandfather     No Known Problems Paternal Grandmother     No Known Problems Paternal Grandfather     Diabetes Maternal Aunt     Breast cancer Cousin     Diabetes Cousin     Breast cancer Other        Meds/Allergies   all current active meds have been reviewed, current meds:   Current Facility-Administered Medications   Medication Dose Route Frequency    acetaminophen (TYLENOL) rectal suppository 650 mg  650 mg Rectal Q6H PRN    albuterol (PROVENTIL HFA,VENTOLIN HFA) inhaler 2 puff  2 puff Inhalation Q6H PRN    Cholecalciferol (VITAMIN D3) tablet 1,000 Units  1,000 Units Oral Daily    clobetasol (TEMOVATE) 0.05 % cream   Topical BID    hydrocortisone (Solu-CORTEF) injection 100 mg  100 mg Intravenous Q8H FAMILIA    hydroxychloroquine (PLAQUENIL) tablet 200 mg  200 mg Oral BID    lidocaine (LIDODERM) 5 % patch 1 patch  1 patch Topical Daily    linezolid (ZYVOX) tablet 600 mg  600 mg Oral Q12H FAMILIA    metoprolol (LOPRESSOR) injection 2.5 mg  2.5 mg Intravenous Q6H    miconazole 2 % cream   Topical BID    multi-electrolyte (PLASMALYTE-A/ISOLYTE-S PH 7.4) IV solution  50 mL/hr Intravenous Continuous    nystatin (MYCOSTATIN) powder   Topical BID    pantoprazole (PROTONIX) injection 40 mg  40 mg Intravenous Q24H FAMILIA    polyethylene glycol (MIRALAX)  packet 17 g  17 g Oral Daily PRN    potassium chloride 20 mEq IVPB (premix)  20 mEq Intravenous Once    senna-docusate sodium (SENOKOT S) 8.6-50 mg per tablet 1 tablet  1 tablet Oral Daily    triamcinolone (KENALOG) 0.1 % cream   Topical BID    vancomycin (VANCOCIN) IVPB (premix in dextrose) 1,000 mg 200 mL  12.5 mg/kg (Adjusted) Intravenous Daily PRN    zinc sulfate (ZINCATE) capsule 220 mg  220 mg Oral Daily   , and PTA meds:   Prior to Admission Medications   Prescriptions Last Dose Informant Patient Reported? Taking?   Ascorbic Acid 500 MG CAPS Not Taking  Yes No   Sig: Take 1 capsule by mouth in the morning   Patient not taking: Reported on 4/11/2024   Clobetasol Propionate E 0.05 % emollient cream Not Taking Self, Outside Facility (Specify) Yes No   Sig: APPLY TWICE A DAY FOR PALM SKIN DERMITITS   Patient not taking: Reported on 4/11/2024   Multiple Vitamins-Minerals (Multi Complete) CAPS Unknown  Yes No   Sig: Take 1 capsule by mouth daily   Sodium Phosphates (ENEMA RE) Not Taking  Yes No   Sig: Insert into the rectum Insert 1 applicator rectally as needed for constipation. Give on Day 6 of no bowel movement if Milk of Magnesia and Bisacodyl suppository not effective   Patient not taking: Reported on 6/6/2024   Zinc 50 MG TABS Not Taking  Yes No   Sig: Take 1 tablet by mouth in the morning   Patient not taking: Reported on 4/11/2024   acetaminophen (TYLENOL) 325 mg tablet Unknown Self, Outside Facility (Specify) No No   Sig: Take 2 tablets (650 mg total) by mouth every 4 (four) hours as needed for mild pain, headaches or fever.   Patient taking differently: Take 650 mg by mouth every 6 (six) hours as needed for mild pain, headaches or fever Give 2 tablets by mouth every 6 hours as needed for fever give 2 tablets every 6 hours as needed for temperature greater tahn 100.5. Do not exceed 3 gm of acetaminophen in 24 hrs   albuterol (Ventolin HFA) 90 mcg/act inhaler Unknown Outside Facility (Specify) No No   Sig:  Inhale 2 puffs every 6 (six) hours as needed for wheezing   alendronate (FOSAMAX) 70 mg tablet Unknown Self, Outside Facility (Specify) Yes No   Sig: Take by mouth every 7 days    apixaban (ELIQUIS) 5 mg Unknown Outside Facility (Specify) No No   Sig: Take 1 tablet (5 mg total) by mouth 2 (two) times a day   bisacodyl ([facility] Bisacodyl) 10 mg suppository Unknown  Yes No   Sig: Insert 10 mg into the rectum if needed for constipation Insert 1 suppository rectally as needed for constipation. Give on Day 5 of no bowel movement if Milk of Magnesia not effective   cholecalciferol (VITAMIN D3) 1,000 units tablet Unknown Outside Facility (Specify) No No   Sig: Take 1 tablet (1,000 Units total) by mouth daily   clobetasol (TEMOVATE) 0.05 % cream Unknown  No No   Sig: Apply topically 2 (two) times a day   furosemide (LASIX) 40 mg tablet   No No   Sig: Take 1 tablet (40 mg total) by mouth daily   gabapentin (Neurontin) 100 mg capsule Unknown Outside Facility (Specify) No No   Sig: Take 1 capsule in AM and 3 capsules in PM   hydrocortisone 2.5 % ointment Not Taking  No No   Sig: Apply topically 4 (four) times a day as needed for irritation   Patient not taking: Reported on 4/11/2024   hydroxychloroquine (PLAQUENIL) 200 mg tablet  Outside Facility (Specify) No No   Sig: Take 1 tablet (200 mg total) by mouth 2 (two) times a day   ketoconazole (NIZORAL) 2 % cream Unknown Self, Outside Facility (Specify) No No   Sig: Apply topically 2 (two) times a day   lidocaine (LIDODERM) 5 % Unknown  No No   Sig: Apply 2 patches topically daily Remove & Discard patch within 12 hours or as directed by MD Do not start before December 20, 2022.   Patient taking differently: Apply 2 patches topically daily Apply to right knee topically one time a day for pain and remove per scheduled   magnesium hydroxide (MILK OF MAGNESIA) 400 mg/5 mL oral suspension Unknown  Yes No   Sig: Take 30 mL by mouth daily as needed for constipation Give 30 ml by mouth as  needed for constipation.Give milk and magnesia if now bowel movement in 3 days (72 hrs)   metoprolol tartrate (LOPRESSOR) 25 mg tablet Unknown Outside Facility (Specify) No No   Sig: Take 1 tablet (25 mg total) by mouth every 12 (twelve) hours   nystatin (MYCOSTATIN) powder Unknown Outside Facility (Specify) No No   Sig: Apply topically 2 (two) times a day   omeprazole (PriLOSEC) 20 mg delayed release capsule Unknown Outside Facility (Specify) No No   Sig: Take 1 capsule (20 mg total) by mouth daily   polyethylene glycol (MIRALAX) 17 g packet  Outside Facility (Specify) No No   Sig: Take 17 g by mouth daily Do not start before November 26, 2022.   Patient not taking: Reported on 2/19/2024   potassium chloride (Klor-Con M10) 10 mEq tablet Not Taking  No No   Sig: Take 2 tablets (20 mEq total) by mouth daily   Patient not taking: Reported on 6/6/2024   predniSONE 5 mg tablet Unknown  No No   Sig: Take 1 tablet (5 mg total) by mouth 2 (two) times a day with meals Do not start before June 5, 2023.   senna-docusate sodium (SENOKOT S) 8.6-50 mg per tablet  Outside Facility (Specify) No No   Sig: Take 1 tablet by mouth daily   Patient taking differently: Take 1 tablet by mouth daily Give 1 tablet by mouth one time a day for constipation   triamcinolone (KENALOG) 0.1 % cream Unknown  Yes No   Sig: Apply topically 2 (two) times a day Apply to BUE and BLE topically everyday and evening shift for psoriasis   white petrolatum-mineral oil (EUCERIN,HYDROCERIN) cream Not Taking Self, Outside Facility (Specify) No No   Sig: Apply topically 3 (three) times a day   Patient not taking: Reported on 2/19/2024      Facility-Administered Medications: None     Allergies   Allergen Reactions    Shellfish-Derived Products - Food Allergy Itching    Methotrexate Derivatives     Amoxicillin Rash    Ampicillin-Sulbactam Sodium Rash       Objective   I/O         06/07 0701 06/08 0700 06/08 0701 06/09 0700 06/09 0701  06/10 0700    I.V. (mL/kg)  "777.5 (7.5)      Total Intake(mL/kg) 777.5 (7.5)      Urine (mL/kg/hr) 150 (0.1) 650 (0.3)     Stool 0 0     Total Output 150 650     Net +627.5 -650            Unmeasured Stool Occurrence  1 x             Physical Exam  Constitutional:       General: She is awake.      Appearance: Normal appearance.   HENT:      Head: Normocephalic and atraumatic.   Eyes:      Conjunctiva/sclera: Conjunctivae normal.   Cardiovascular:      Rate and Rhythm: Normal rate.   Pulmonary:      Effort: Pulmonary effort is normal. No respiratory distress.   Skin:     Comments: Diffuse desquamative rash noted   Neurological:      Mental Status: She is alert.      Coordination: Finger-Nose-Finger Test normal.   Psychiatric:         Cognition and Memory: Cognition is impaired. Memory is impaired.       Neurologic Exam     Mental Status   GCS 14, appears confused, not a good historian     Cranial Nerves     CN III, IV, VI   Conjugate gaze: present    CN VIII   Hearing: intact    Motor Exam   Right arm pronator drift: absent  Left arm pronator drift: absentDiffusely weak, upper extremities 4 out of 5 throughout  Able to wiggle toes bilaterally, 1 out of 5 hip flexors, bedbound at baseline     Gait, Coordination, and Reflexes     Coordination   Finger to nose coordination: normal    Tremor   Resting tremor: absent  Intention tremor: absent  Action tremor: absent    Reflexes   Right : 2+  Left : 2+      Vitals:Blood pressure 126/90, pulse 92, temperature 97.6 °F (36.4 °C), resp. rate 16, height 5' 3\" (1.6 m), weight 104 kg (228 lb 9.9 oz), SpO2 100%.,Body mass index is 40.5 kg/m².     Lab Results:   Results from last 7 days   Lab Units 06/09/24  0823 06/08/24  0541 06/07/24  1032 06/06/24  1107   WBC Thousand/uL 6.94 6.98 7.80 6.77   HEMOGLOBIN g/dL 10.9* 11.4* 13.5 15.7*   HEMATOCRIT % 35.5 37.5 44.7 51.2*   PLATELETS Thousands/uL 171 176 228 225   SEGS PCT %  --  89* 75 74   MONO PCT %  --  4 6 5   EOS PCT %  --  0 3 2     Results from " last 7 days   Lab Units 06/09/24  0632 06/08/24  0541 06/07/24 2059 06/06/24  1107   POTASSIUM mmol/L 3.3* 3.8 4.0 4.5   CHLORIDE mmol/L 112* 110* 109* 101   CO2 mmol/L 26 25 26 23   BUN mg/dL 34* 32* 28* 27*   CREATININE mg/dL 1.50* 1.78* 1.85* 1.75*   CALCIUM mg/dL 8.8 8.4 8.6 9.8   ALK PHOS U/L  --  32*  --  53   ALT U/L  --  15  --  17   AST U/L  --  21  --  34     Results from last 7 days   Lab Units 06/09/24  0632 06/08/24  0541   MAGNESIUM mg/dL 2.0 1.8*     Results from last 7 days   Lab Units 06/08/24  0541   PHOSPHORUS mg/dL 4.2*         Imaging Studies: I have personally reviewed pertinent reports.   and I have personally reviewed pertinent films in PACS    MRI lumbar spine wo contrast    Result Date: 6/9/2024  Impression: No evidence of discitis/osteomyelitis or epidural collection in the lumbar spine. See concurrent thoracic MRI for discussion of suspected discitis/osteomyelitis at T9-T10. Degenerative spondylosis most pronounced at L4-5. Workstation performed: [id number]     MRI thoracic spine wo contrast    Result Date: 6/9/2024  Impression: Findings suspicious for discitis/osteomyelitis at T9-T10. No epidural collection Scoliosis and degenerative changes. The study was marked in EPIC for immediate notification. Workstation performed: [id number]     MRI brain wo contrast    Result Date: 6/9/2024  Impression: No acute intracranial pathology. Chronic microangiopathy. Workstation performed: [id number]     IR non-tunneled central line placement    Result Date: 6/7/2024  Impression: Insertion of right IJV non-tunneled dual-lumen temporary central venous catheter, with tip in the expected location of the cavoatrial junction. Plan: The catheter may be used immediately. Workstation performed: [id number]     CT head wo contrast    Result Date: 6/6/2024  Impression: No acute intracranial abnormality.  Chronic microangiopathic changes. Workstation performed: [id number]     CT chest abdomen pelvis wo  contrast    Result Date: 6/6/2024  Impression: Mild pulmonary edema likely secondary to CHF. Small pleural effusions. No acute findings in the abdomen or pelvis. The study was marked in EPIC for significant notification. Workstation performed: [id number]      EKG, Pathology, and Other Studies: I have personally reviewed pertinent reports.      VTE Prophylaxis: Sequential compression device (Venodyne)     Code Status: Level 1 - Full Code  Advance Directive and Living Will:      Power of :    POLST:      Counseling / Coordination of Care  I spent 20 minutes with the patient.

## 2024-06-09 NOTE — ASSESSMENT & PLAN NOTE
Maintained at home with metoprolol tartrate 25mg BID; change to IV as NPO  Continue Eliquis - [id number] score 5 -> change to therapeutic lovenox as NPO   Monitor VS closely  No need for telemetry at this time

## 2024-06-09 NOTE — PROGRESS NOTES
[facility]  Progress Note  Name: Bhavna Al I  MRN: [id number]  Unit/Bed#: [facility] 820-01 I Date of Admission: 6/6/2024   Date of Service: 6/8/2024 I Hospital Day: 2    Assessment & Plan   Bacteremia  Assessment & Plan  1/2 BC from admission growing MRSA  Unclear if truw infection  Consulted ID - will treat as true  Cont IV Vanco, check 2 D echo  Repeat BC on 6/8  Given recent c/o mid back pain will get MRI  T& L spine & brain    Chronic diastolic heart failure (HCC)  Assessment & Plan  Wt Readings from Last 3 Encounters:   06/08/24 103 kg (228 lb)   06/08/24 103 kg (228 lb)   02/19/24 113 kg (249 lb)     Last EF 65% in 1/2024 although poor windows; prior TTE in 11/2022 with EF 55%  On Lopressor 25mg BID and Lasix 40mg daily at home  Continue home BB  Holding home lasix with concern for prerenal AMANDA in setting of reduced PO intake  Strict I/O  Daily standing weights  Restart Lasix when euvolemic      Psoriasis  Assessment & Plan  Continue topical agents as ordered  Frequent skin exams due to previous admission with staphylococcal scalded skin syndrome  ID recs derm consult to see if this is SSS or her psoriasis  Dermatology evaluation appreciated-status post biopsy    Paroxysmal atrial fibrillation (HCC)  Assessment & Plan  Maintained at home with metoprolol tartrate 25mg BID; change to IV as NPO  Continue Eliquis - [id number] score 5 -> change to therapeutic lovenox as NPO   Monitor VS closely  No need for telemetry at this time    Acute kidney injury superimposed on CKD  (HCC)  Assessment & Plan  Lab Results   Component Value Date    EGFR 27 06/08/2024    EGFR 26 06/07/2024    EGFR 28 06/06/2024    CREATININE 1.78 (H) 06/08/2024    CREATININE 1.85 (H) 06/07/2024    CREATININE 1.75 (H) 06/06/2024     CKD 3 at baseline with baseline Cr 0.8-1.0  AMANDA on admission CR 1.75  Likely prerenal azotemia due to reduced p.o. intake for several days prior to admission  Cont IVF, hold  diuretic  Daily BMP - difficulty with blood draw  Avoid further nephrotoxic agents  Holding home Lasix given AMANDA, monitor volume status closely  Strict I's/O      Hyperparathyroidism (HCC)  Assessment & Plan  Not on medications at home  Monitor Ca and P levels inpatient  No need for Nephro consult at this time    Vitamin D deficiency  Assessment & Plan  Continue Vitamin D supplementation    Osteoporosis  Assessment & Plan  Regimen includes calcium plus vitamin D supplements as well as weekly alendronate  Continue calcium/vitamin D supplementation inpatient, hold weekly alendronate  Fall precautions    Essential hypertension  Assessment & Plan  home Lopressor 25 mg twice daily  Switch to IV lopressor here  Speech evaluation pending.  Will change to p.o. after speech evaluation     GERD (gastroesophageal reflux disease)  Assessment & Plan  Continue home PPI    Rheumatoid arthritis (HCC)  Assessment & Plan  Follows with rheumatology outpatient  Continue home hydroxychloroquine 200 mg twice daily and prednisone 5 mg twice daily -> switched steroid to IV given lethargy      * Lethargy  Assessment & Plan  Patient noted to be lethargic for 3 to 4 days prior to admission reduced p.o. 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 Daughter thinks she developed stroke at some point this year when she was sick with infection. Hold lovenox if she may need LP               VTE Pharmacologic Prophylaxis: On hold    Mobility:   Basic Mobility Inpatient Raw Score: 6  JH-HLM Goal: 2: Bed activities/Dependent transfer  JH-HLM Achieved: 2: Bed activities/Dependent transfer  JH-HLM Goal achieved. Continue to encourage appropriate mobility.    Patient Centered Rounds: I performed bedside rounds with nursing staff today.   Discussions with Specialists or Other Care Team Provider:     Education and Discussions with Family / Patient: Attempted to update  (daughter) via phone. Left voicemail.     Total Time Spent on Date of Encounter in care of patient: 35 mins. This time was spent on one or more of the following: performing physical exam; counseling and coordination of care; obtaining or reviewing history; documenting in the medical record; reviewing/ordering tests, medications or procedures; communicating with other healthcare professionals and discussing with patient's family/caregivers.    Current Length of Stay: 2 day(s)  Current Patient Status: Inpatient   Certification Statement: The patient will continue to require additional inpatient hospital stay due to IV antibiotics  Discharge Plan: Anticipate discharge in >72 hrs to rehab facility.    Code Status: Level 1 - Full Code    Subjective:   Patient seen and examined.  ID evaluation appreciated.  Dermatology evaluated the patient.  Status post punch biopsy.  Patient with gram-positive bacteremia.  Patient is more awake alert today.  Less asking for water when I was in the  room    Objective:     Vitals:   Temp (24hrs), Av.3 °F (36.8 °C), Min:97.5 °F (36.4 °C), Max:99.3 °F (37.4 °C)    Temp:  [97.5 °F (36.4 °C)-99.3 °F (37.4 °C)] 98 °F (36.7 °C)  HR:  [] 106  Resp:  [16-22] 18  BP: ()/(53-87) 106/61  SpO2:  [97 %-100 %] 98 %  Body mass index is 40.39 kg/m².     Input and Output Summary (last 24 hours):     Intake/Output Summary (Last 24 hours) at 2024  Last data filed at 2024 1500  Gross per 24 hour   Intake 777.5 ml   Output 350 ml   Net 427.5 ml       Physical Exam:   Physical Exam  Constitutional:       General: She is not in acute distress.  Eyes:      General: No scleral icterus.     Conjunctiva/sclera: Conjunctivae normal.   Cardiovascular:      Rate and Rhythm: Normal rate and regular rhythm.   Pulmonary:      Effort: Pulmonary effort is normal. No respiratory distress.   Abdominal:      General: There is no distension.   Musculoskeletal:         General: Normal range of motion.   Skin:     Findings: Rash present.      Comments: Extensive skin rash/scaling noted   Neurological:      Mental Status: She is alert. Mental status is at baseline.          Additional Data:     Labs:  Results from last 7 days   Lab Units 24  0541   WBC Thousand/uL 6.98   HEMOGLOBIN g/dL 11.4*   HEMATOCRIT % 37.5   PLATELETS Thousands/uL 176   SEGS PCT % 89*   LYMPHO PCT % 6*   MONO PCT % 4   EOS PCT % 0     Results from last 7 days   Lab Units 24  0541   SODIUM mmol/L 147   POTASSIUM mmol/L 3.8   CHLORIDE mmol/L 110*   CO2 mmol/L 25   BUN mg/dL 32*   CREATININE mg/dL 1.78*   ANION GAP mmol/L 12   CALCIUM mg/dL 8.4   ALBUMIN g/dL 2.4*   TOTAL BILIRUBIN mg/dL 0.42   ALK PHOS U/L 32*   ALT U/L 15   AST U/L 21   GLUCOSE RANDOM mg/dL 89                 Results from last 7 days   Lab Units 249 24  1032 24  1114 24  1107   LACTIC ACID mmol/L 1.4  --  4.2*  --    PROCALCITONIN ng/ml  --  0.37*  --  0.20       Lines/Drains:  Invasive Devices        Central Venous Catheter Line  Duration             CVC Central Lines 06/07/24 Double Right Internal jugular 1 day              Peripheral Intravenous Line  Duration             Peripheral IV 06/07/24 Right Antecubital 1 day              Drain  Duration             External Urinary Catheter 1 day                    Central Line:  Goal for removal: N/A - Discharging with PICC for IV ABX/medications         Telemetry:  Telemetry Orders (From admission, onward)               24 Hour Telemetry Monitoring  Continuous x 24 Hours (Telem)        Question:  Reason for 24 Hour Telemetry  Answer:  Arrhythmias requiring acute medical intervention / PPM or ICD malfunction                     Telemetry Reviewed: Normal Sinus Rhythm  Indication for Continued Telemetry Use: Arrthymias requiring medical therapy             Imaging: Reviewed radiology reports from this admission including: CT head    Recent Cultures (last 7 days):   Results from last 7 days   Lab Units 06/07/24  1946 06/06/24  1720 06/06/24  1114 06/06/24  1107   BLOOD CULTURE   --   --   --  No Growth at 48 hrs.   GRAM STAIN RESULT  No Polys or Bacteria seen  Rare Gram positive cocci in pairs*  Rare Gram negative rods*  Rare Epithelial Cells*  No polys seen*  No Polys or Bacteria seen  Rare Gram negative rods*  No polys seen*  No Polys or Bacteria seen  No Polys or Bacteria seen  --  Gram positive cocci in clusters*  --    URINE CULTURE   --  Culture results to follow.  --   --    WOUND CULTURE  Culture too young- will reincubate  Culture too young- will reincubate  No growth  Culture too young- will reincubate  No growth  Culture too young- will reincubate  No growth  --   --   --        Last 24 Hours Medication List:   Current Facility-Administered Medications   Medication Dose Route Frequency Provider Last Rate    acetaminophen  650 mg Rectal Q6H PRN Marzena Mendez MD      albuterol  2 puff Inhalation Q6H PRN Jersey Pretty DO       cholecalciferol  1,000 Units Oral Daily Jersey Pretty, DO      clobetasol   Topical BID Jersey Pretty, DO      hydrocortisone sodium succinate  100 mg Intravenous Q8H [facility] Marzena Mendez MD      hydroxychloroquine  200 mg Oral BID Jersey Pretty, DO      lidocaine  1 patch Topical Daily Jersey Pretty, DO      metoprolol  2.5 mg Intravenous Q6H Marzena Mendez MD      miconazole   Topical BID Jersey Pretty, DO      nystatin   Topical BID Jersey Pretty, DO      pantoprazole  40 mg Intravenous Q24H [facility] Marzena Mendez MD      polyethylene glycol  17 g Oral Daily PRN Jersey Pretty, DO      senna-docusate sodium  1 tablet Oral Daily Jersey Pretty, DO      sodium chloride  50 mL/hr Intravenous Continuous Marzena Mendez MD 50 mL/hr (06/07/24 1427)    triamcinolone   Topical BID Jersey Pretty, DO      vancomycin  12.5 mg/kg (Adjusted) Intravenous Daily PRN Ann Moe MD      zinc sulfate  220 mg Oral Daily Jersey Pretty, DO          Today, Patient Was Seen By: Valentine Brewer MD    **Please Note: This note may have been constructed using a voice recognition system.**

## 2024-06-09 NOTE — ASSESSMENT & PLAN NOTE
ID following  Patient has gram-positive bacteremia with MRSA and coagulase-negative Staphylococcus isolated and 1 out of 2 blood cultures  Continues on IV vancomycin  Given evidence of osteomyelitis will likely need prolonged course

## 2024-06-10 ENCOUNTER — APPOINTMENT (INPATIENT)
Dept: RADIOLOGY | Facility: HOSPITAL | Age: 74
DRG: 539 | End: 2024-06-10
Payer: MEDICARE

## 2024-06-10 LAB
ANION GAP SERPL CALCULATED.3IONS-SCNC: 7 MMOL/L (ref 4–13)
BACTERIA WND AEROBE CULT: ABNORMAL
BUN SERPL-MCNC: 36 MG/DL (ref 5–25)
CALCIUM SERPL-MCNC: 9 MG/DL (ref 8.4–10.2)
CHLORIDE SERPL-SCNC: 112 MMOL/L (ref 96–108)
CO2 SERPL-SCNC: 27 MMOL/L (ref 21–32)
CREAT SERPL-MCNC: 1.4 MG/DL (ref 0.6–1.3)
GFR SERPL CREATININE-BSD FRML MDRD: 36 ML/MIN/1.73SQ M
GLUCOSE SERPL-MCNC: 106 MG/DL (ref 65–140)
GRAM STN SPEC: ABNORMAL
MAGNESIUM SERPL-MCNC: 2.1 MG/DL (ref 1.9–2.7)
POTASSIUM SERPL-SCNC: 3.5 MMOL/L (ref 3.5–5.3)
SODIUM SERPL-SCNC: 146 MMOL/L (ref 135–147)
VANCOMYCIN TROUGH SERPL-MCNC: 11.9 UG/ML (ref 10–20)

## 2024-06-10 PROCEDURE — 99233 SBSQ HOSP IP/OBS HIGH 50: CPT | Performed by: INTERNAL MEDICINE

## 2024-06-10 PROCEDURE — 80048 BASIC METABOLIC PNL TOTAL CA: CPT | Performed by: INTERNAL MEDICINE

## 2024-06-10 PROCEDURE — 99222 1ST HOSP IP/OBS MODERATE 55: CPT | Performed by: INTERNAL MEDICINE

## 2024-06-10 PROCEDURE — 72072 X-RAY EXAM THORAC SPINE 3VWS: CPT

## 2024-06-10 PROCEDURE — 88305 TISSUE EXAM BY PATHOLOGIST: CPT | Performed by: STUDENT IN AN ORGANIZED HEALTH CARE EDUCATION/TRAINING PROGRAM

## 2024-06-10 PROCEDURE — 83735 ASSAY OF MAGNESIUM: CPT | Performed by: INTERNAL MEDICINE

## 2024-06-10 PROCEDURE — 80202 ASSAY OF VANCOMYCIN: CPT | Performed by: INTERNAL MEDICINE

## 2024-06-10 PROCEDURE — C9113 INJ PANTOPRAZOLE SODIUM, VIA: HCPCS | Performed by: HOSPITALIST

## 2024-06-10 PROCEDURE — 99232 SBSQ HOSP IP/OBS MODERATE 35: CPT | Performed by: INTERNAL MEDICINE

## 2024-06-10 PROCEDURE — 88342 IMHCHEM/IMCYTCHM 1ST ANTB: CPT | Performed by: STUDENT IN AN ORGANIZED HEALTH CARE EDUCATION/TRAINING PROGRAM

## 2024-06-10 PROCEDURE — 88312 SPECIAL STAINS GROUP 1: CPT | Performed by: STUDENT IN AN ORGANIZED HEALTH CARE EDUCATION/TRAINING PROGRAM

## 2024-06-10 RX ORDER — VANCOMYCIN HYDROCHLORIDE 750 MG/150ML
10 INJECTION, SOLUTION INTRAVENOUS EVERY 24 HOURS
Status: DISCONTINUED | OUTPATIENT
Start: 2024-06-10 | End: 2024-06-17

## 2024-06-10 RX ADMIN — HYDROCORTISONE SODIUM SUCCINATE 100 MG: 100 INJECTION, POWDER, FOR SOLUTION INTRAMUSCULAR; INTRAVENOUS at 05:22

## 2024-06-10 RX ADMIN — NYSTATIN: 100000 POWDER TOPICAL at 18:06

## 2024-06-10 RX ADMIN — LIDOCAINE 5% 1 PATCH: 700 PATCH TOPICAL at 09:38

## 2024-06-10 RX ADMIN — MUPIROCIN: 20 OINTMENT TOPICAL at 18:04

## 2024-06-10 RX ADMIN — Medication 12.5 MG: at 22:56

## 2024-06-10 RX ADMIN — Medication 1000 UNITS: at 09:17

## 2024-06-10 RX ADMIN — LINEZOLID 600 MG: 600 TABLET, FILM COATED ORAL at 09:19

## 2024-06-10 RX ADMIN — HYDROCORTISONE SODIUM SUCCINATE 50 MG: 100 INJECTION, POWDER, FOR SOLUTION INTRAMUSCULAR; INTRAVENOUS at 22:56

## 2024-06-10 RX ADMIN — TRIAMCINOLONE ACETONIDE: 1 CREAM TOPICAL at 12:01

## 2024-06-10 RX ADMIN — ZINC SULFATE 220 MG (50 MG) CAPSULE 220 MG: CAPSULE at 09:19

## 2024-06-10 RX ADMIN — Medication 12.5 MG: at 09:17

## 2024-06-10 RX ADMIN — APIXABAN 5 MG: 5 TABLET, FILM COATED ORAL at 18:01

## 2024-06-10 RX ADMIN — LINEZOLID 600 MG: 600 TABLET, FILM COATED ORAL at 22:56

## 2024-06-10 RX ADMIN — PANTOPRAZOLE SODIUM 40 MG: 40 INJECTION, POWDER, FOR SOLUTION INTRAVENOUS at 09:17

## 2024-06-10 RX ADMIN — SENNOSIDES AND DOCUSATE SODIUM 1 TABLET: 50; 8.6 TABLET ORAL at 09:19

## 2024-06-10 RX ADMIN — VANCOMYCIN HYDROCHLORIDE 750 MG: 750 INJECTION, SOLUTION INTRAVENOUS at 09:17

## 2024-06-10 RX ADMIN — NYSTATIN: 100000 POWDER TOPICAL at 09:00

## 2024-06-10 RX ADMIN — SODIUM CHLORIDE, SODIUM GLUCONATE, SODIUM ACETATE, POTASSIUM CHLORIDE, MAGNESIUM CHLORIDE, SODIUM PHOSPHATE, DIBASIC, AND POTASSIUM PHOSPHATE 50 ML/HR: .53; .5; .37; .037; .03; .012; .00082 INJECTION, SOLUTION INTRAVENOUS at 06:21

## 2024-06-10 RX ADMIN — HYDROXYCHLOROQUINE SULFATE 200 MG: 200 TABLET ORAL at 18:04

## 2024-06-10 RX ADMIN — HYDROCORTISONE SODIUM SUCCINATE 50 MG: 100 INJECTION, POWDER, FOR SOLUTION INTRAMUSCULAR; INTRAVENOUS at 15:01

## 2024-06-10 RX ADMIN — APIXABAN 5 MG: 5 TABLET, FILM COATED ORAL at 09:18

## 2024-06-10 RX ADMIN — HYDROXYCHLOROQUINE SULFATE 200 MG: 200 TABLET ORAL at 09:19

## 2024-06-10 NOTE — ASSESSMENT & PLAN NOTE
Lab Results   Component Value Date    EGFR 36 06/09/2024    EGFR 33 06/09/2024    EGFR 27 06/08/2024    CREATININE 1.40 (H) 06/09/2024    CREATININE 1.50 (H) 06/09/2024    CREATININE 1.78 (H) 06/08/2024     CKD 3 at baseline with baseline Cr 0.8-1.0  AMANDA on admission CR 1.75 and worsened to 1.85  Likely prerenal azotemia due to reduced p.o. intake for several days prior to admission  Cont IVF, hold diuretic

## 2024-06-10 NOTE — ASSESSMENT & PLAN NOTE
Has multiple wounds  Recent diagnosis of SSSS  S/p punch biopsy by dermatology  Wound cultures - staph aureus  Linezolid added as above

## 2024-06-10 NOTE — ASSESSMENT & PLAN NOTE
In the setting of above and chronic Prednisone use at 5 mg BID  On stress dose steroids Hydrocortisone 100 mg IV q 8 hrs from 6/7/24  BP improved  Taper steroids as able

## 2024-06-10 NOTE — ASSESSMENT & PLAN NOTE
Wt Readings from Last 3 Encounters:   06/09/24 104 kg (230 lb 6.1 oz)   06/08/24 103 kg (228 lb)   02/19/24 113 kg (249 lb)     Last EF 65% in 1/2024 although poor windows; prior TTE in 11/2022 with EF 55%  Home Lasix held as hypovolemic  On gentle IVF's  Monitor volume status

## 2024-06-10 NOTE — PROGRESS NOTES
Bhavna Al is a 73 y.o. female who is currently ordered Vancomycin IV with management by the Pharmacy Consult service.  Relevant clinical data and objective / subjective history reviewed.  Vancomycin Assessment:  Indication and Goal AUC/Trough: Soft tissue (goal -600, trough >10), -600, trough >10  Clinical Status: stable  Micro:           Renal Function:  SCr: 1.4 mg/dL  CrCl: 40.2 mL/min  Renal replacement: Not on dialysis  Days of Therapy: 5  Current Dose: 1000mg  iv prn level <15  Vancomycin Plan:  New Dosing: change to 750mg Iv q24hrs  Estimated AUC: 494 mcg*hr/mL  Estimated Trough: 15.7 mcg/mL  Next Level: 6/13 0600  Renal Function Monitoring: Daily BMP and UOP  Pharmacy will continue to follow closely for s/sx of nephrotoxicity, infusion reactions and appropriateness of therapy.  BMP and CBC will be ordered per protocol. We will continue to follow the patient’s culture results and clinical progress daily.    Fiona Vidal, Pharmacist

## 2024-06-10 NOTE — ASSESSMENT & PLAN NOTE
Presented on 6/6/24 with generalized weakness, requiring increased assistance with ADL's, back pain and poor oral intake  Had a similar presentation in 1/2024 due to SSSS with septic shock and gram positive bacteremia requiringh prolonged IV antibiotics  Was initially begun on Cefepime and Vancomycin  1 of 2 blood cultures positive for MRSA and coagulase-negative staphylococcus  Cefepime discontinued on 6/7/24 and Vancomycin continued  MRI thoracic spine (6/9/24) - Findings suspicious for discitis/osteomyelitis at T9-T10. No epidural collection.  Wound cultures - staph aureus  TTE - no vegetations  Linezolid added today  Plan discussed with infectious disease and dermatology

## 2024-06-10 NOTE — CONSULTS
Endocrinology Consultation  Bhavna Al 73 y.o. female MRN: [id number]  Unit/Bed#: [facility] 820-01 Encounter: [id number]      Assessment & Plan     Assessment:  This is a 73 y.o.-year-old female with longstanding prednisone use for 20 years in the setting of complex history including psoriasis, rheumatoid arthritis, sarcoidosis, recent admission for staph scalded skin syndrome January 2024, admitted with lethargy, found bacteremic with GPC, SIRS positive, likely discitis/osteomyelitis at T9-T10, again concern for SSSS.  Endocrinology has been consulted for adrenal insufficiency evaluation.  Patient has been treated with stress dose steroids since admission on 6/7.    Plan:  --Patient likely with secondary adrenal insufficiency in the setting of decades of prednisone use, unable to perform testing due to high-dose steroid therapy chronic  --At this point has received 4 days of high dose stress dose steroids  --Will initiate wean off of stress dose steroids, starting with 50 mg every 8 hours now  --While endocrinology can help guide stress dose steroid taper wean, cannot determine final dose of steroid as patient is on chronic prednisone for her rheumatoid arthritis, and ultimately may require rheumatological input for prednisone dosing in the setting of multiple recent infectious complications. Hydrocortisone does not have sufficient glucocorticoid properties to manage this.   --Will continue to follow and make adjustments as appropriate    CC: AI Consult    History of Present Illness     HPI: Bhavna Al is a 73 y.o. year old female with complex medical history including, psoriasis, sarcoidosis, rheumatoid arthritis on chronic prednisone therapy, recent staph scalded skin syndrome in January 2024,  afib on Eliquis, CHF, CKD3, osteoporosis, HTN, HLD, hypothyroid.    Patient presented to ED from nursing facility on 6/6 with lethargy, reduced oral intake.  Reportedly had not missed any medications.  Patient found  bacteremic GPC and with discitis/osteomyelitis at T9-T10, AMANDA, again concern for SSSS.  Mentation improving.    Patient was started on stress dose steroids hydrocortisone 100 mg every 8 hours on , upon which she remains.  Pressures have been soft to normotensive regardless.  Endocrinology has been consulted for adrenal insufficiency.  Per chart review has been on low dose of 5 to 10 mg Prednisone for her rheumatoid arthritis daily since .  Most recent dose prednisone 5 mg twice daily, which is currently on hold.    Inpatient consult to Endocrinology  Consult performed by: Batsheva Whitney DO  Consult ordered by: Cathy Ribeiro MD          Review of Systems   Musculoskeletal:  Positive for back pain and gait problem.   Skin:  Positive for rash and wound.   Psychiatric/Behavioral:  Positive for confusion (Improving).        Historical Information   Past Medical History:   Diagnosis Date    Abnormal thyroid function test     last assessed: 2015     Arthritis     Caries     last assessed: 2016     Continuous opioid dependence (HCC) 2021    Edema of right lower extremity     last assessed: 2015     GERD (gastroesophageal reflux disease)     Hypertension     Medicare annual wellness visit, subsequent 2021    Positive blood culture 3/11/2022    Sarcoid      Past Surgical History:   Procedure Laterality Date     SECTION      IR NON-TUNNELED CENTRAL LINE PLACEMENT  2024    IR PICC PLACEMENT SINGLE LUMEN  2024    MULTIPLE TOOTH EXTRACTIONS N/A 2016    Procedure: Surgical extraction of teeth 2, 18, 19, 30, 31; incision and drainage of left subperiosteal abscess ;  Surgeon: Clara Cevallos DMD;  Location: [facility];  Service:      Social History   Social History     Substance and Sexual Activity   Alcohol Use Not Currently     Social History     Substance and Sexual Activity   Drug Use No     Social History     Tobacco Use   Smoking Status Never    Smokeless Tobacco Never     Family History:   Family History   Problem Relation Age of Onset    Asthma Mother     Stroke Mother     No Known Problems Father     No Known Problems Sister     No Known Problems Brother     No Known Problems Maternal Grandmother     No Known Problems Maternal Grandfather     No Known Problems Paternal Grandmother     No Known Problems Paternal Grandfather     Diabetes Maternal Aunt     Breast cancer Cousin     Diabetes Cousin     Breast cancer Other        Meds/Allergies   Current Facility-Administered Medications   Medication Dose Route Frequency Provider Last Rate Last Admin    acetaminophen (TYLENOL) rectal suppository 650 mg  650 mg Rectal Q6H PRN Marzena Mendez MD        albuterol (PROVENTIL HFA,VENTOLIN HFA) inhaler 2 puff  2 puff Inhalation Q6H PRN Jersey Pretty, DO        apixaban (ELIQUIS) tablet 5 mg  5 mg Oral BID Cathy Ribeiro MD   5 mg at 06/10/24 0918    Cholecalciferol (VITAMIN D3) tablet 1,000 Units  1,000 Units Oral Daily Jersey Pretty DO   1,000 Units at 06/10/24 0917    clobetasol (TEMOVATE) 0.05 % cream   Topical BID Jersey Pretty DO   Given at 06/09/24 1730    hydrocortisone (Solu-CORTEF) injection 100 mg  100 mg Intravenous Q8H [facility] Marzena Mendez MD   100 mg at 06/10/24 0522    hydroxychloroquine (PLAQUENIL) tablet 200 mg  200 mg Oral BID Jersey Pretty, DO   200 mg at 06/10/24 0919    lidocaine (LIDODERM) 5 % patch 1 patch  1 patch Topical Daily Jersey Pretty DO   1 patch at 06/10/24 0938    linezolid (ZYVOX) tablet 600 mg  600 mg Oral Q12H [facility] Bhavna Cunningham, DO   600 mg at 06/10/24 0919    metoprolol tartrate (LOPRESSOR) partial tablet 12.5 mg  12.5 mg Oral Q12H [facility] Cathy Ribeiro MD   12.5 mg at 06/10/24 0917    miconazole 2 % cream   Topical BID Jersey Pretty DO   Given at 06/09/24 1730    multi-electrolyte (PLASMALYTE-A/ISOLYTE-S PH 7.4) IV solution  50 mL/hr Intravenous Continuous Cathy  "MD Quintin 50 mL/hr at 06/10/24 0621 50 mL/hr at 06/10/24 0621    nystatin (MYCOSTATIN) powder   Topical BID Jersey Pretty, DO   Given at 06/09/24 2127    pantoprazole (PROTONIX) injection 40 mg  40 mg Intravenous Q24H FAMILIA Mendez MD   40 mg at 06/10/24 0917    polyethylene glycol (MIRALAX) packet 17 g  17 g Oral Daily PRN Jersey Pretty DO        senna-docusate sodium (SENOKOT S) 8.6-50 mg per tablet 1 tablet  1 tablet Oral Daily Jersey Pretty, DO   1 tablet at 06/10/24 0919    triamcinolone (KENALOG) 0.1 % cream   Topical BID Jersey Pretty DO   Given at 06/09/24 1733    vancomycin (VANCOCIN) IVPB (premix in dextrose) 750 mg 150 mL  10 mg/kg (Adjusted) Intravenous Q24H Bhavna Aldea,  mL/hr at 06/10/24 0917 750 mg at 06/10/24 0917    zinc sulfate (ZINCATE) capsule 220 mg  220 mg Oral Daily Jersey Pretty, DO   220 mg at 06/10/24 0919     Allergies   Allergen Reactions    Shellfish-Derived Products - Food Allergy Itching    Methotrexate Derivatives     Amoxicillin Rash    Ampicillin-Sulbactam Sodium Rash       Objective   Vitals: Blood pressure 149/83, pulse 74, temperature 98 °F (36.7 °C), resp. rate 17, height 5' 3\" (1.6 m), weight 104 kg (230 lb 6.1 oz), SpO2 100%.    Intake/Output Summary (Last 24 hours) at 6/10/2024 1111  Last data filed at 6/10/2024 0900  Gross per 24 hour   Intake 1307.5 ml   Output 170 ml   Net 1137.5 ml     Invasive Devices       Central Venous Catheter Line  Duration             CVC Central Lines 06/07/24 Double Right Internal jugular 2 days                    Physical Exam  Constitutional:       General: She is not in acute distress.     Appearance: Normal appearance. She is obese. She is not ill-appearing, toxic-appearing or diaphoretic.      Comments: + Right anterior chest picc   HENT:      Head: Normocephalic and atraumatic.      Nose: Nose normal.   Eyes:      Extraocular Movements: Extraocular movements intact.      " "Conjunctiva/sclera: Conjunctivae normal.      Pupils: Pupils are equal, round, and reactive to light.   Pulmonary:      Effort: Pulmonary effort is normal. No respiratory distress.   Musculoskeletal:         General: No deformity.   Skin:     General: Skin is warm and dry.      Findings: Rash (Diffuse psoriatic rash) present.   Neurological:      General: No focal deficit present.      Mental Status: She is alert.      Comments: Conversational, limited by back pain   Psychiatric:         Mood and Affect: Mood normal.         Behavior: Behavior normal.         Thought Content: Thought content normal.         The history was obtained from the review of the chart, patient.    Lab Results:       Lab Results   Component Value Date    WBC 6.94 06/09/2024    HGB 10.9 (L) 06/09/2024    HCT 35.5 06/09/2024    MCV 92 06/09/2024     06/09/2024     Lab Results   Component Value Date/Time    BUN 36 (H) 06/10/2024 05:23 AM    K 3.5 06/10/2024 05:23 AM     (H) 06/10/2024 05:23 AM    CO2 27 06/10/2024 05:23 AM    CO2 31 11/21/2022 02:32 PM    CREATININE 1.40 (H) 06/10/2024 05:23 AM    AST 21 06/08/2024 05:41 AM    ALT 15 06/08/2024 05:41 AM    TP 5.4 (L) 06/08/2024 05:41 AM    ALB 2.4 (L) 06/08/2024 05:41 AM     No results for input(s): \"CHOL\", \"HDL\", \"LDL\", \"TRIG\", \"VLDL\" in the last 72 hours.  No results found for: \"MICROALBUR\", \"[id number]\"  POC Glucose (mg/dl)   Date Value   02/10/2024 89   02/10/2024 85   02/09/2024 127   02/09/2024 135   02/09/2024 91   02/08/2024 97   02/08/2024 117   02/08/2024 103   02/08/2024 80   02/07/2024 117       Imaging Studies: I have personally reviewed pertinent reports.      Batsheva Whitney  Endocrinology PGY-4    Please Tigertext questions to the physician covering the \"[id number]\" Role. Thank you.   "

## 2024-06-10 NOTE — PROGRESS NOTES
[facility]  Progress Note  Name: Bhavna Al I  MRN: [id number]  Unit/Bed#: [facility] 820-01 I Date of Admission: 6/6/2024   Date of Service: 6/9/2024 I Hospital Day: 3    Assessment & Plan   * Gram-positive bacteremia  Assessment & Plan  Presented on 6/6/24 with generalized weakness, requiring increased assistance with ADL's, back pain and poor oral intake  Had a similar presentation in 1/2024 due to SSSS with septic shock and gram positive bacteremia requiringh prolonged IV antibiotics  Was initially begun on Cefepime and Vancomycin  1 of 2 blood cultures positive for MRSA and coagulase-negative staphylococcus  Cefepime discontinued on 6/7/24 and Vancomycin continued  MRI thoracic spine (6/9/24) - Findings suspicious for discitis/osteomyelitis at T9-T10. No epidural collection.  Wound cultures - staph aureus  TTE - no vegetations  Linezolid added today  Plan discussed with infectious disease and dermatology    Acute osteomyelitis of thoracic spine (HCC)  Assessment & Plan  MRI as above  Discussed with neurosurgery - defer bracing in the setting of extensive skin changes, upright thoracic Xrays ordered  Antibiotics as above    Psoriasis  Assessment & Plan  Has multiple wounds  Recent diagnosis of SSSS  S/p punch biopsy by dermatology  Wound cultures - staph aureus  Linezolid added as above      SIRS (systemic inflammatory response syndrome) ([facility])  Assessment & Plan  With fever and tachycardia  Likely due to gram-positive bacteremia    Hypotension  Assessment & Plan  In the setting of above and chronic Prednisone use at 5 mg BID  On stress dose steroids Hydrocortisone 100 mg IV q 8 hrs from 6/7/24  BP improved  Taper steroids as able    Acute kidney injury superimposed on CKD  ([facility])  Assessment & Plan  Lab Results   Component Value Date    EGFR 36 06/09/2024    EGFR 33 06/09/2024    EGFR 27 06/08/2024    CREATININE 1.40 (H) 06/09/2024    CREATININE 1.50 (H) 06/09/2024    CREATININE  1.78 (H) 06/08/2024     CKD 3 at baseline with baseline Cr 0.8-1.0  AMANDA on admission CR 1.75 and worsened to 1.85  Likely prerenal azotemia due to reduced p.o. intake for several days prior to admission  Cont IVF, hold diuretic      Rheumatoid arthritis (HCC)  Assessment & Plan  Follows with rheumatology outpatient  Continue home hydroxychloroquine 200 mg twice daily   Prednisone 5 mg twice daily held as on stress dose steroids       Paroxysmal atrial fibrillation (HCC)  Assessment & Plan  Maintained at home with metoprolol tartrate 25mg BID; was changed to IV as NPO - change back to lower dose PO as BP tolerates  Eliquis was held as NPO - restart    GERD (gastroesophageal reflux disease)  Assessment & Plan  Continue home PPI    Chronic diastolic heart failure (HCC)  Assessment & Plan  Wt Readings from Last 3 Encounters:   06/09/24 104 kg (230 lb 6.1 oz)   06/08/24 103 kg (228 lb)   02/19/24 113 kg (249 lb)     Last EF 65% in 1/2024 although poor windows; prior TTE in 11/2022 with EF 55%  Home Lasix held as hypovolemic  On gentle IVF's  Monitor volume status      Lethargy  Assessment & Plan  Improved  Plan as above  Supportive care    Dysphagia  Assessment & Plan  Diet advanced to dental soft per speech recommendation         VTE Pharmacologic Prophylaxis: VTE Score: 7 High Risk (Score >/= 5) - Pharmacological DVT Prophylaxis Ordered: apixaban (Eliquis). Sequential Compression Devices Ordered.    Mobility:   Basic Mobility Inpatient Raw Score: 6  JH-HLM Goal: 2: Bed activities/Dependent transfer  JH-HLM Achieved: 2: Bed activities/Dependent transfer  JH-HLM Goal achieved. Continue to encourage appropriate mobility.    Patient Centered Rounds: Discussed with RN   Discussions with Specialists or Other Care Team Provider: Discussed with infectious disease, dermatology and neurosurgery    Education and Discussions with Family / Patient: Updated  (daughter) via phone.    Total Time Spent on Date of  Encounter in care of patient: 40 mins. This time was spent on one or more of the following: performing physical exam; counseling and coordination of care; obtaining or reviewing history; documenting in the medical record; reviewing/ordering tests, medications or procedures; communicating with other healthcare professionals and discussing with patient's family/caregivers.    Current Length of Stay: 3 day(s)  Current Patient Status: Inpatient   Certification Statement: The patient will continue to require additional inpatient hospital stay due to bacteremia    Code Status: Level 1 - Full Code    Subjective:   No fever. No back pain at present. More alert and interactive.    Objective:     Vitals:   Temp (24hrs), Av.9 °F (36.6 °C), Min:97.4 °F (36.3 °C), Max:98.3 °F (36.8 °C)    Temp:  [97.4 °F (36.3 °C)-98.3 °F (36.8 °C)] 98.3 °F (36.8 °C)  HR:  [] 100  Resp:  [16-21] 21  BP: ()/(56-90) 121/56  SpO2:  [97 %-100 %] 99 %  Body mass index is 40.81 kg/m².     Physical Exam:   Physical Exam  Vitals reviewed.   HENT:      Head: Normocephalic.      Nose: Nose normal.      Mouth/Throat:      Mouth: Mucous membranes are moist.   Eyes:      Extraocular Movements: Extraocular movements intact.   Cardiovascular:      Rate and Rhythm: Normal rate and regular rhythm.   Pulmonary:      Effort: Pulmonary effort is normal. No respiratory distress.      Breath sounds: Normal breath sounds. No wheezing.   Abdominal:      General: Bowel sounds are normal. There is no distension.      Palpations: Abdomen is soft.      Tenderness: There is no abdominal tenderness.   Musculoskeletal:         General: No swelling.      Cervical back: Neck supple.   Skin:     Comments: Diffuse xerosis   Neurological:      General: No focal deficit present.      Mental Status: She is alert.          Additional Data:     Labs:  Results from last 7 days   Lab Units 24  0823 24  0541   WBC Thousand/uL 6.94 6.98   HEMOGLOBIN g/dL 10.9*  11.4*   HEMATOCRIT % 35.5 37.5   PLATELETS Thousands/uL 171 176   SEGS PCT %  --  89*   LYMPHO PCT %  --  6*   MONO PCT %  --  4   EOS PCT %  --  0     Results from last 7 days   Lab Units 06/09/24  1552 06/09/24  0632 06/08/24  0541   SODIUM mmol/L 145   < > 147   POTASSIUM mmol/L 3.7   < > 3.8   CHLORIDE mmol/L 109*   < > 110*   CO2 mmol/L 25   < > 25   BUN mg/dL 36*   < > 32*   CREATININE mg/dL 1.40*   < > 1.78*   ANION GAP mmol/L 11   < > 12   CALCIUM mg/dL 8.9   < > 8.4   ALBUMIN g/dL  --   --  2.4*   TOTAL BILIRUBIN mg/dL  --   --  0.42   ALK PHOS U/L  --   --  32*   ALT U/L  --   --  15   AST U/L  --   --  21   GLUCOSE RANDOM mg/dL 107   < > 89    < > = values in this interval not displayed.                 Results from last 7 days   Lab Units 06/07/24  2059 06/07/24  1032 06/06/24  1114 06/06/24  1107   LACTIC ACID mmol/L 1.4  --  4.2*  --    PROCALCITONIN ng/ml  --  0.37*  --  0.20       Lines/Drains:  Invasive Devices       Central Venous Catheter Line  Duration             CVC Central Lines 06/07/24 Double Right Internal jugular 2 days                    Central Line:  Goal for removal: Will discontinue when peripheral access obtained.     Recent Cultures (last 7 days):   Results from last 7 days   Lab Units 06/07/24  1946 06/06/24  1720 06/06/24  1114 06/06/24  1107   BLOOD CULTURE   --   --  Methicillin Resistant Staphylococcus aureus*  Staphylococcus epidermidis* No Growth at 72 hrs.   GRAM STAIN RESULT  Rare Gram positive cocci in pairs*  Rare Gram negative rods*  Rare Epithelial Cells*  No polys seen*  Rare Gram negative rods*  No polys seen*  No Polys or Bacteria seen  No Polys or Bacteria seen  No Polys or Bacteria seen  No Polys or Bacteria seen  --  Gram positive cocci in clusters*  --    URINE CULTURE   --  20,000-29,000 cfu/ml Escherichia coli*  <10,000 cfu/ml Escherichia coli*  <10,000 cfu/ml Pseudomonas aeruginosa MDR*  --   --    WOUND CULTURE  1+ Growth of Staphylococcus aureus*   2+ Growth of  2+ Growth of Staphylococcus aureus*  2+ Growth of  Few Colonies of Staphylococcus aureus*  1+ Growth of  1+ Growth of  Few Colonies of  1+ Growth of Staphylococcus aureus*  1+ Growth of  3+ Growth of Staphylococcus aureus*  3+ Growth of  --   --   --        Last 24 Hours Medication List:   Current Facility-Administered Medications   Medication Dose Route Frequency Provider Last Rate    acetaminophen  650 mg Rectal Q6H PRN Marzena Mendez MD      albuterol  2 puff Inhalation Q6H PRN Jersey Pretty, DO      apixaban  5 mg Oral BID Cathy Ribeiro MD      cholecalciferol  1,000 Units Oral Daily Jersey Pretty, DO      clobetasol   Topical BID Jersey Pretty, DO      hydrocortisone sodium succinate  100 mg Intravenous Q8H FAMILIA Marzena Mendez MD      hydroxychloroquine  200 mg Oral BID Jersey Pretty, DO      lidocaine  1 patch Topical Daily Jersey Pretty, DO      linezolid  600 mg Oral Q12H [facility] Bhavna Cunningham DO      [START ON 6/10/2024] metoprolol tartrate  12.5 mg Oral Q12H [facility] Cathy Ribeiro MD      miconazole   Topical BID Jersey Pretty, DO      multi-electrolyte  50 mL/hr Intravenous Continuous Cathy Ribeiro MD 50 mL/hr (06/09/24 1058)    nystatin   Topical BID Jersey Pretty, DO      pantoprazole  40 mg Intravenous Q24H [facility] Marzena Mendez MD      polyethylene glycol  17 g Oral Daily PRN Jersey Pretty, DO      senna-docusate sodium  1 tablet Oral Daily Jersey Pretty, DO      triamcinolone   Topical BID Jersey Pretty, DO      vancomycin  12.5 mg/kg (Adjusted) Intravenous Daily PRN Ann Moe MD      zinc sulfate  220 mg Oral Daily Jersey rPetty DO          Today, Patient Was Seen By: Cathy Ribeiro MD    **Please Note: This note may have been constructed using a voice recognition system.**

## 2024-06-10 NOTE — ASSESSMENT & PLAN NOTE
Maintained at home with metoprolol tartrate 25mg BID; was changed to IV as NPO - change back to lower dose PO as BP tolerates  Eliquis was held as NPO - restart

## 2024-06-10 NOTE — ASSESSMENT & PLAN NOTE
Follows with rheumatology outpatient  Continue home hydroxychloroquine 200 mg twice daily   Prednisone 5 mg twice daily held as on stress dose steroids

## 2024-06-10 NOTE — QUICK NOTE
Quick E-note    Subjective: Patient greatly improved this morning compared to admission. She is awake, alert, eating apple sauce and smiling. She feels much better. Her skin is still flaking. Team notes that her status greatly improved around Saturday.    Objective:   - Wound cultures growing staph  - Linezolid added to regimen of vancomycin.  - Labs stable to improving  - MRI concerning for focus of osteomyelitis thoracic spine          Recommendations:  - Follow up results of biopsy  - Follow up results of repeat blood cultures  - Continue vancomycin and linezolid  - Please continue inpatient staph decolonization protocol (CHG baths and alcohol wipes). Please also add on mupirocin ointment to each nostril (ie on cotton tipped applicator), under fingernails, and in belly button BID for 5 days each month (repeat for 3 months- can initiate while inpatient if possible otherwise okay to start as outpatient).   - She is already scheduled for outpatient derm follow up on 6/25/24 and next appt 11/13/24 with derm. We will ensure staph is eradicated at 11/13/24 appt or sooner  - Derm will continue to follow    Petra Velarde  PGY3 Dermatology Resident

## 2024-06-10 NOTE — PROGRESS NOTES
Progress Note - Infectious Disease   Bhavna Al 73 y.o. female MRN: [id number]  Unit/Bed#: [facility] 820-01 Encounter: [id number]      Impression/Plan:  Gram Positive Bacteremia: patient has fever and tachycardia in the setting of chronic immunosuppression and blood cultures grew gram positive cocci in clusters, patient currently meets SIRS, unclear source of infection, possibility of an occult source, possibility of the skin, UTI, spine as the source. Negative for pneumonia  Continue Vancomycin, Linezolid  Monitor vancomycin levels  TTE was technically difficult study, no need for JACINTO at this time  1/2 blood cultures from admission grew MRSA  Follow up repeat blood culture   SIRS: with fever and tachycardia, suspected 2/2 gram postive bacteremia  Antibiotics as above, consider stress dose steroids  Trend CBC with differential and BMP to make sure developing no toxicities  Supportive care  Acute Encephalopathy  Monitor cognition, antibiotics as above  Psoriasis  Dermatology following  Obtained punch biopsy and wound cultures, follow up pathology  Possible concern for SSSS  Added linezolid  Rheumatoid Arthritis  On home hydroxycloroquine and prednisone twice daily  Low threshold to start stress dose steroids if she decompensates  Acute Kidney Injury: likely prerenal in the setting of decreased PO intake at the nursing home and the presence of atrial fibrillation with RVR  Trend BMP, dose adjusted antibiotics  Back Pain  MRI TSP: Findings suspicious for discitis/osteomyelitis at T9-T10  MRI LSP: No evidence of discitis/osteomyelitis  Neurosurgery consulted, recommended conservative management for acute OM of the thoracic spine, ordered baseline X-rays  Defer bracing in the setting of chronic skin changes    Antibiotics:  Vancomycin 750mg daily  Linezolid 600mg PO q12    Subjective:  Patient has no fever, chills, sweats; no nausea, vomiting, diarrhea; no cough, shortness of breath. No new  symptoms.    Objective:  Vitals:  Temp:  [97.6 °F (36.4 °C)-98.5 °F (36.9 °C)] 98 °F (36.7 °C)  HR:  [] 74  Resp:  [16-21] 17  BP: ()/(56-90) 149/83  SpO2:  [97 %-100 %] 100 %  Temp (24hrs), Av.1 °F (36.7 °C), Min:97.6 °F (36.4 °C), Max:98.5 °F (36.9 °C)  Current: Temperature: 98 °F (36.7 °C)    Physical Exam:   General Appearance:  Alert, interactive, nontoxic, no acute distress.   Throat: Oropharynx moist without lesions.    Lungs:   Clear to auscultation bilaterally; no wheezes, rhonchi or rales; respirations unlabored   Heart:  RRR; no murmur, rub or gallop   Abdomen:   Soft, non-tender, non-distended, positive bowel sounds.     Extremities: No clubbing, cyanosis or edema   Skin: No new rashes or lesions. No draining wounds noted.       Labs, Imaging, & Other studies:   All pertinent labs and imaging studies were personally reviewed  Results from last 7 days   Lab Units 24  0823 24  0541 24  1032   WBC Thousand/uL 6.94 6.98 7.80   HEMOGLOBIN g/dL 10.9* 11.4* 13.5   PLATELETS Thousands/uL 171 176 228     Results from last 7 days   Lab Units 06/10/24  0523 24  1552 24  0632 24  0541 24  2059 24  1107   SODIUM mmol/L 146 145 148* 147   < > 139   POTASSIUM mmol/L 3.5 3.7 3.3* 3.8   < > 4.5   CHLORIDE mmol/L 112* 109* 112* 110*   < > 101   CO2 mmol/L 27 25 26 25   < > 23   BUN mg/dL 36* 36* 34* 32*   < > 27*   CREATININE mg/dL 1.40* 1.40* 1.50* 1.78*   < > 1.75*   EGFR ml/min/1.73sq m 36 36 33 27   < > 28   CALCIUM mg/dL 9.0 8.9 8.8 8.4   < > 9.8   AST U/L  --   --   --  21  --  34   ALT U/L  --   --   --  15  --  17   ALK PHOS U/L  --   --   --  32*  --  53    < > = values in this interval not displayed.     Results from last 7 days   Lab Units 24  1553 24  1946 24  0654 24  1720 24  1114 24  1107   BLOOD CULTURE  Received in Microbiology Lab. Culture in Progress.  --   --   --  Methicillin Resistant Staphylococcus  aureus*  Staphylococcus epidermidis* No Growth at 72 hrs.   GRAM STAIN RESULT   --  Rare Gram positive cocci in pairs*  Rare Gram negative rods*  Rare Epithelial Cells*  No polys seen*  No Polys or Bacteria seen  No Polys or Bacteria seen  Rare Gram negative rods*  No polys seen*  No Polys or Bacteria seen  No Polys or Bacteria seen  --   --  Gram positive cocci in clusters*  --    URINE CULTURE   --   --   --  20,000-29,000 cfu/ml Escherichia coli*  <10,000 cfu/ml Escherichia coli*  <10,000 cfu/ml Pseudomonas aeruginosa MDR*  --   --    WOUND CULTURE   --  1+ Growth of Methicillin Resistant Staphylococcus aureus*  2+ Growth of  1+ Growth of Methicillin Resistant Staphylococcus aureus*  1+ Growth of  3+ Growth of Methicillin Resistant Staphylococcus aureus*  3+ Growth of  2+ Growth of Staphylococcus aureus*  2+ Growth of  Few Colonies of Staphylococcus aureus*  1+ Growth of  1+ Growth of  Few Colonies of  --   --   --   --    MRSA CULTURE ONLY   --   --  Culture results to follow.  --   --   --      Results from last 7 days   Lab Units 06/07/24  1032 06/06/24  1107   PROCALCITONIN ng/ml 0.37* 0.20

## 2024-06-10 NOTE — ASSESSMENT & PLAN NOTE
MRI as above  Discussed with neurosurgery - defer bracing in the setting of extensive skin changes, upright thoracic Xrays ordered  Antibiotics as above

## 2024-06-10 NOTE — PLAN OF CARE
Problem: PAIN - ADULT  Goal: Verbalizes/displays adequate comfort level or baseline comfort level  Description: Interventions:  - Encourage patient to monitor pain and request assistance  - Assess pain using appropriate pain scale  - Administer analgesics based on type and severity of pain and evaluate response  - Implement non-pharmacological measures as appropriate and evaluate response  - Consider cultural and social influences on pain and pain management  - Notify physician/advanced practitioner if interventions unsuccessful or patient reports new pain  Outcome: Progressing     Problem: INFECTION - ADULT  Goal: Absence or prevention of progression during hospitalization  Description: INTERVENTIONS:  - Assess and monitor for signs and symptoms of infection  - Monitor lab/diagnostic results  - Monitor all insertion sites, i.e. indwelling lines, tubes, and drains  - Monitor endotracheal if appropriate and nasal secretions for changes in amount and color  - [place] appropriate cooling/warming therapies per order  - Administer medications as ordered  - Instruct and encourage patient and family to use good hand hygiene technique  - Identify and instruct in appropriate isolation precautions for identified infection/condition  Outcome: Progressing  Goal: Absence of fever/infection during neutropenic period  Description: INTERVENTIONS:  - Monitor WBC    Outcome: Progressing     Problem: SAFETY ADULT  Goal: Patient will remain free of falls  Description: INTERVENTIONS:  - Educate patient/family on patient safety including physical limitations  - Instruct patient to call for assistance with activity   - Consult OT/PT to assist with strengthening/mobility   - Keep Call bell within reach  - Keep bed low and locked with side rails adjusted as appropriate  - Keep care items and personal belongings within reach  - Initiate and maintain comfort rounds  - Make Fall Risk Sign visible to staff  - Apply yellow socks and bracelet  for high fall risk patients  - Consider moving patient to room near nurses station  Outcome: Progressing  Goal: Maintain or return to baseline ADL function  Description: INTERVENTIONS:  -  Assess patient's ability to carry out ADLs; assess patient's baseline for ADL function and identify physical deficits which impact ability to perform ADLs (bathing, care of mouth/teeth, toileting, grooming, dressing, etc.)  - Assess/evaluate cause of self-care deficits   - Assess range of motion  - Assess patient's mobility; develop plan if impaired  - Assess patient's need for assistive devices and provide as appropriate  - Encourage maximum independence but intervene and supervise when necessary  - Involve family in performance of ADLs  - Assess for home care needs following discharge   - Consider OT consult to assist with ADL evaluation and planning for discharge  - Provide patient education as appropriate  Outcome: Progressing  Goal: Maintains/Returns to pre admission functional level  Description: INTERVENTIONS:  - Perform AM-PAC 6 Click Basic Mobility/ Daily Activity assessment daily.  - Set and communicate daily mobility goal to care team and patient/family/caregiver.   - Out of bed for toileting  - Record patient progress and toleration of activity level   Outcome: Progressing     Problem: DISCHARGE PLANNING  Goal: Discharge to home or other facility with appropriate resources  Description: INTERVENTIONS:  - Identify barriers to discharge w/patient and caregiver  - Arrange for needed discharge resources and transportation as appropriate  - Identify discharge learning needs (meds, wound care, etc.)  - Arrange for interpretive services to assist at discharge as needed  - Refer to Case Management Department for coordinating discharge planning if the patient needs post-hospital services based on physician/advanced practitioner order or complex needs related to functional status, cognitive ability, or social support  system  Outcome: Progressing     Problem: Nutrition/Hydration-ADULT  Goal: Nutrient/Hydration intake appropriate for improving, restoring or maintaining nutritional needs  Description: Monitor and assess patient's nutrition/hydration status for malnutrition. Collaborate with interdisciplinary team and initiate plan and interventions as ordered.  Monitor patient's weight and dietary intake as ordered or per policy. Utilize nutrition screening tool and intervene as necessary. Determine patient's food preferences and provide high-protein, high-caloric foods as appropriate.     INTERVENTIONS:  - Monitor oral intake, urinary output, labs, and treatment plans  - Assess nutrition and hydration status and recommend course of action  - Evaluate amount of meals eaten  - Assist patient with eating if necessary   - Allow adequate time for meals  - Recommend/ encourage appropriate diets, oral nutritional supplements, and vitamin/mineral supplements  - Order, calculate, and assess calorie counts as needed  - Recommend, monitor, and adjust tube feedings and TPN/PPN based on assessed needs  - Assess need for intravenous fluids  - Provide specific nutrition/hydration education as appropriate  - Include patient/family/caregiver in decisions related to nutrition  Outcome: Progressing

## 2024-06-11 ENCOUNTER — TELEPHONE (OUTPATIENT)
Age: 74
End: 2024-06-11

## 2024-06-11 PROBLEM — G93.41 ACUTE METABOLIC ENCEPHALOPATHY: Status: ACTIVE | Noted: 2022-11-21

## 2024-06-11 PROBLEM — E27.49 SECONDARY ADRENAL INSUFFICIENCY (HCC): Status: ACTIVE | Noted: 2024-06-11

## 2024-06-11 PROBLEM — B95.62 MRSA BACTEREMIA: Status: ACTIVE | Noted: 2024-06-09

## 2024-06-11 PROBLEM — L89.323 PRESSURE ULCER OF LEFT BUTTOCK, STAGE 3 (HCC): Status: ACTIVE | Noted: 2024-06-11

## 2024-06-11 LAB
ANION GAP SERPL CALCULATED.3IONS-SCNC: 10 MMOL/L (ref 4–13)
BACTERIA BLD CULT: NORMAL
BACTERIA WND AEROBE CULT: ABNORMAL
BACTERIA WND AEROBE CULT: ABNORMAL
BUN SERPL-MCNC: 37 MG/DL (ref 5–25)
CALCIUM SERPL-MCNC: 9 MG/DL (ref 8.4–10.2)
CHLORIDE SERPL-SCNC: 109 MMOL/L (ref 96–108)
CO2 SERPL-SCNC: 27 MMOL/L (ref 21–32)
CREAT SERPL-MCNC: 1.29 MG/DL (ref 0.6–1.3)
CRP SERPL QL: 38.6 MG/L
ERYTHROCYTE [DISTWIDTH] IN BLOOD BY AUTOMATED COUNT: 15.1 % (ref 11.6–15.1)
ERYTHROCYTE [SEDIMENTATION RATE] IN BLOOD: 41 MM/HOUR (ref 0–29)
GFR SERPL CREATININE-BSD FRML MDRD: 40 ML/MIN/1.73SQ M
GLUCOSE SERPL-MCNC: 98 MG/DL (ref 65–140)
GRAM STN SPEC: ABNORMAL
GRAM STN SPEC: ABNORMAL
HCT VFR BLD AUTO: 34.7 % (ref 34.8–46.1)
HGB BLD-MCNC: 10.7 G/DL (ref 11.5–15.4)
MCH RBC QN AUTO: 28.4 PG (ref 26.8–34.3)
MCHC RBC AUTO-ENTMCNC: 30.8 G/DL (ref 31.4–37.4)
MCV RBC AUTO: 92 FL (ref 82–98)
MRSA NOSE QL CULT: ABNORMAL
MRSA NOSE QL CULT: ABNORMAL
PLATELET # BLD AUTO: 161 THOUSANDS/UL (ref 149–390)
PMV BLD AUTO: 12.4 FL (ref 8.9–12.7)
POTASSIUM SERPL-SCNC: 3.4 MMOL/L (ref 3.5–5.3)
RBC # BLD AUTO: 3.77 MILLION/UL (ref 3.81–5.12)
SODIUM SERPL-SCNC: 146 MMOL/L (ref 135–147)
WBC # BLD AUTO: 4.88 THOUSAND/UL (ref 4.31–10.16)

## 2024-06-11 PROCEDURE — 85652 RBC SED RATE AUTOMATED: CPT | Performed by: INTERNAL MEDICINE

## 2024-06-11 PROCEDURE — 99232 SBSQ HOSP IP/OBS MODERATE 35: CPT | Performed by: INTERNAL MEDICINE

## 2024-06-11 PROCEDURE — 99233 SBSQ HOSP IP/OBS HIGH 50: CPT | Performed by: INTERNAL MEDICINE

## 2024-06-11 PROCEDURE — C9113 INJ PANTOPRAZOLE SODIUM, VIA: HCPCS | Performed by: HOSPITALIST

## 2024-06-11 PROCEDURE — 80048 BASIC METABOLIC PNL TOTAL CA: CPT | Performed by: INTERNAL MEDICINE

## 2024-06-11 PROCEDURE — 86140 C-REACTIVE PROTEIN: CPT | Performed by: INTERNAL MEDICINE

## 2024-06-11 PROCEDURE — 85027 COMPLETE CBC AUTOMATED: CPT | Performed by: INTERNAL MEDICINE

## 2024-06-11 RX ORDER — POTASSIUM CHLORIDE 20 MEQ/1
40 TABLET, EXTENDED RELEASE ORAL ONCE
Status: COMPLETED | OUTPATIENT
Start: 2024-06-11 | End: 2024-06-11

## 2024-06-11 RX ADMIN — LIDOCAINE 5% 1 PATCH: 700 PATCH TOPICAL at 09:49

## 2024-06-11 RX ADMIN — POTASSIUM CHLORIDE 40 MEQ: 1500 TABLET, EXTENDED RELEASE ORAL at 09:50

## 2024-06-11 RX ADMIN — SENNOSIDES AND DOCUSATE SODIUM 1 TABLET: 50; 8.6 TABLET ORAL at 09:50

## 2024-06-11 RX ADMIN — APIXABAN 5 MG: 5 TABLET, FILM COATED ORAL at 16:37

## 2024-06-11 RX ADMIN — HYDROXYCHLOROQUINE SULFATE 200 MG: 200 TABLET ORAL at 16:36

## 2024-06-11 RX ADMIN — VANCOMYCIN HYDROCHLORIDE 750 MG: 750 INJECTION, SOLUTION INTRAVENOUS at 09:49

## 2024-06-11 RX ADMIN — LINEZOLID 600 MG: 600 TABLET, FILM COATED ORAL at 21:23

## 2024-06-11 RX ADMIN — LINEZOLID 600 MG: 600 TABLET, FILM COATED ORAL at 09:50

## 2024-06-11 RX ADMIN — NYSTATIN: 100000 POWDER TOPICAL at 09:49

## 2024-06-11 RX ADMIN — HYDROCORTISONE SODIUM SUCCINATE 50 MG: 100 INJECTION, POWDER, FOR SOLUTION INTRAMUSCULAR; INTRAVENOUS at 21:23

## 2024-06-11 RX ADMIN — Medication 12.5 MG: at 21:23

## 2024-06-11 RX ADMIN — HYDROCORTISONE SODIUM SUCCINATE 50 MG: 100 INJECTION, POWDER, FOR SOLUTION INTRAMUSCULAR; INTRAVENOUS at 14:37

## 2024-06-11 RX ADMIN — ZINC SULFATE 220 MG (50 MG) CAPSULE 220 MG: CAPSULE at 09:50

## 2024-06-11 RX ADMIN — MUPIROCIN: 20 OINTMENT TOPICAL at 09:49

## 2024-06-11 RX ADMIN — HYDROXYCHLOROQUINE SULFATE 200 MG: 200 TABLET ORAL at 09:49

## 2024-06-11 RX ADMIN — Medication 1000 UNITS: at 09:50

## 2024-06-11 RX ADMIN — PANTOPRAZOLE SODIUM 40 MG: 40 INJECTION, POWDER, FOR SOLUTION INTRAVENOUS at 09:49

## 2024-06-11 RX ADMIN — MUPIROCIN: 20 OINTMENT TOPICAL at 16:37

## 2024-06-11 RX ADMIN — APIXABAN 5 MG: 5 TABLET, FILM COATED ORAL at 09:50

## 2024-06-11 RX ADMIN — NYSTATIN: 100000 POWDER TOPICAL at 17:13

## 2024-06-11 RX ADMIN — HYDROCORTISONE SODIUM SUCCINATE 50 MG: 100 INJECTION, POWDER, FOR SOLUTION INTRAMUSCULAR; INTRAVENOUS at 05:36

## 2024-06-11 RX ADMIN — Medication 12.5 MG: at 09:49

## 2024-06-11 NOTE — TREATMENT PLAN
Presumed thoracic osteomyelitis at T9-10 in the setting of MRSA bacteremia  Initially presented on 6/6/2024 with lethargy, poor oral intake  Of note has extensive dermatological issues including staph scalded skin syndrome which required prolonged IV antibiotics    Imaging reviewed with :    Thoracic spine x-ray 6/10/2024:Significantly limited exam with extremely poor visualization of the thoracic spine on lateral views due to soft tissue attenuation factors.    Plan:  Imaging reviewed, concerning for discitis/osteomyelitis of the thoracic spine although it is a noncontrasted study likely secondary to AMANDA.  Thankfully there is no epidural collection.  Would recommend conservative management.  ID following, appreciate recommendations  Will defer bracing at this time.  Patient has extensive dermatological issues and would not want to interfere with this.  Also given her body habitus and bedbound status it will likely not contribute much.  Continue to monitor neuro exam  Medical management and pain control per primary team  DVT ppx:  SCDs  Mobilize as tolerated with assistance, PT / OT evaluation    Neurosurgery will sign off, patient will follow-up in 4 weeks with repeat thoracic spine x-ray, call with any further questions or concerns.

## 2024-06-11 NOTE — ASSESSMENT & PLAN NOTE
Wt Readings from Last 3 Encounters:   06/09/24 104 kg (230 lb 6.1 oz)   06/08/24 103 kg (228 lb)   02/19/24 113 kg (249 lb)     Last EF 65% in 1/2024 although poor windows; prior TTE in 11/2022 with EF 55%  Home Lasix held as hypovolemic/AMANDA  Monitor volume status

## 2024-06-11 NOTE — PROGRESS NOTES
[facility]  Progress Note  Name: Bhavna Al I  MRN: [id number]  Unit/Bed#: [facility] 820-01 I Date of Admission: 6/6/2024   Date of Service: 6/11/2024 I Hospital Day: 5    Assessment & Plan   Psoriasis  Assessment & Plan  Has multiple wounds  Recent diagnosis of SSSS  S/p punch biopsy by dermatology  Wound cultures - staph aureus  Linezolid added as above      SIRS (systemic inflammatory response syndrome) ([facility])  Assessment & Plan  With fever and tachycardia  Likely due to gram-positive bacteremia  Plan as above    Acute kidney injury superimposed on CKD  ([facility])  Assessment & Plan  Lab Results   Component Value Date    EGFR 36 06/10/2024    EGFR 36 06/09/2024    EGFR 33 06/09/2024    CREATININE 1.40 (H) 06/10/2024    CREATININE 1.40 (H) 06/09/2024    CREATININE 1.50 (H) 06/09/2024     CKD 3 at baseline with baseline Cr 0.8-1.0  AMANDA on admission with creatinine 1.75 and worsened to 1.85  Likely prerenal azotemia due to reduced p.o. intake for several days prior to admission  Creatinine improving   Continue gentle IVF's, hold diuretic      Rheumatoid arthritis (HCC)  Assessment & Plan  Follows with rheumatology outpatient  Continue home hydroxychloroquine 200 mg twice daily   Prednisone 5 mg twice daily held as on stress dose steroids       Paroxysmal atrial fibrillation (HCC)  Assessment & Plan  Maintained at home with metoprolol tartrate 25mg BID; was changed to IV as NPO - changed back to lower dose PO as BP tolerates  Continue Eliquis    GERD (gastroesophageal reflux disease)  Assessment & Plan  Continue home PPI    Chronic diastolic heart failure (HCC)  Assessment & Plan  Wt Readings from Last 3 Encounters:   06/09/24 104 kg (230 lb 6.1 oz)   06/08/24 103 kg (228 lb)   02/19/24 113 kg (249 lb)     Last EF 65% in 1/2024 although poor windows; prior TTE in 11/2022 with EF 55%  Home Lasix held as hypovolemic  On gentle IVF's  Monitor volume status      Acute metabolic  encephalopathy  Assessment & Plan  Lethargy on presentation in the setting of above  Improved  Supportive care   Plan as above    Pressure ulcer of left buttock, stage 3 (HCC)  Assessment & Plan  POA  Wound care    Essential hypertension  Assessment & Plan  home Lopressor 25 mg twice daily  Switch to IV lopressor here  Speech evaluation pending.  Will change to p.o. after speech evaluation          VTE Pharmacologic Prophylaxis: VTE Score: 7 High Risk (Score >/= 5) - Pharmacological DVT Prophylaxis Ordered: apixaban (Eliquis). Sequential Compression Devices Ordered.    Mobility:   Basic Mobility Inpatient Raw Score: 6  JH-HLM Goal: 2: Bed activities/Dependent transfer  JH-HLM Achieved: 2: Bed activities/Dependent transfer  JH-HLM Goal achieved. Continue to encourage appropriate mobility.    Patient Centered Rounds: Discussed with RN  Discussions with Specialists or Other Care Team Provider: Discussed with infectious disease    Education and Discussions with Family / Patient: Attempted to update  (daughter) via phone. Left voicemail.  Discussed with granddaughter on the phone    Total Time Spent on Date of Encounter in care of patient: 20 mins. This time was spent on one or more of the following: performing physical exam; counseling and coordination of care; obtaining or reviewing history; documenting in the medical record; reviewing/ordering tests, medications or procedures; communicating with other healthcare professionals and discussing with patient's family/caregivers.    Current Length of Stay: 5 day(s)  Current Patient Status: Inpatient   Certification Statement: The patient will continue to require additional inpatient hospital stay due to as above    Code Status: Level 1 - Full Code    Subjective:   Awake and alert.  No fever.  No back pain.    Objective:     Vitals:   Temp (24hrs), Av.1 °F (36.7 °C), Min:98 °F (36.7 °C), Max:98.3 °F (36.8 °C)    Temp:  [98 °F (36.7 °C)-98.3 °F (36.8 °C)]  98.3 °F (36.8 °C)  HR:  [69-80] 80  Resp:  [17-22] 22  BP: (124-149)/(52-83) 124/52  SpO2:  [98 %-100 %] 99 %  Body mass index is 40.81 kg/m².     Physical Exam:   Physical Exam  Vitals reviewed.   HENT:      Head: Normocephalic.      Nose: Nose normal.      Mouth/Throat:      Mouth: Mucous membranes are moist.   Eyes:      Extraocular Movements: Extraocular movements intact.   Cardiovascular:      Rate and Rhythm: Normal rate and regular rhythm.   Pulmonary:      Effort: Pulmonary effort is normal. No respiratory distress.      Breath sounds: Normal breath sounds. No wheezing.   Abdominal:      General: Bowel sounds are normal.      Palpations: Abdomen is soft.   Musculoskeletal:         General: No swelling.      Cervical back: Neck supple.   Skin:     Comments: Diffuse xerosis   Neurological:      General: No focal deficit present.      Mental Status: She is alert and oriented to person, place, and time.   Psychiatric:         Mood and Affect: Mood normal.         Behavior: Behavior normal.          Additional Data:     Labs:  Results from last 7 days   Lab Units 06/09/24  0823 06/08/24  0541   WBC Thousand/uL 6.94 6.98   HEMOGLOBIN g/dL 10.9* 11.4*   HEMATOCRIT % 35.5 37.5   PLATELETS Thousands/uL 171 176   SEGS PCT %  --  89*   LYMPHO PCT %  --  6*   MONO PCT %  --  4   EOS PCT %  --  0     Results from last 7 days   Lab Units 06/10/24  0523 06/09/24  0632 06/08/24  0541   SODIUM mmol/L 146   < > 147   POTASSIUM mmol/L 3.5   < > 3.8   CHLORIDE mmol/L 112*   < > 110*   CO2 mmol/L 27   < > 25   BUN mg/dL 36*   < > 32*   CREATININE mg/dL 1.40*   < > 1.78*   ANION GAP mmol/L 7   < > 12   CALCIUM mg/dL 9.0   < > 8.4   ALBUMIN g/dL  --   --  2.4*   TOTAL BILIRUBIN mg/dL  --   --  0.42   ALK PHOS U/L  --   --  32*   ALT U/L  --   --  15   AST U/L  --   --  21   GLUCOSE RANDOM mg/dL 106   < > 89    < > = values in this interval not displayed.                 Results from last 7 days   Lab Units 06/07/24  3909  06/07/24  1032 06/06/24  1114 06/06/24  1107   LACTIC ACID mmol/L 1.4  --  4.2*  --    PROCALCITONIN ng/ml  --  0.37*  --  0.20       Lines/Drains:  Invasive Devices       Central Venous Catheter Line  Duration             CVC Central Lines 06/07/24 Double Right Internal jugular 3 days                    Central Line:  Goal for removal: Will discontinue when peripheral access obtained.     Recent Cultures (last 7 days):   Results from last 7 days   Lab Units 06/09/24  1553 06/07/24  1946 06/06/24  1720 06/06/24  1114 06/06/24  1107   BLOOD CULTURE  No Growth at 24 hrs.  --   --  Methicillin Resistant Staphylococcus aureus*  Staphylococcus epidermidis* No Growth After 4 Days.   GRAM STAIN RESULT   --  Rare Gram negative rods*  No polys seen*  No Polys or Bacteria seen  Rare Gram positive cocci in pairs*  Rare Gram negative rods*  Rare Epithelial Cells*  No polys seen*  No Polys or Bacteria seen  No Polys or Bacteria seen  No Polys or Bacteria seen  --  Gram positive cocci in clusters*  --    URINE CULTURE   --   --  20,000-29,000 cfu/ml Escherichia coli*  <10,000 cfu/ml Escherichia coli*  <10,000 cfu/ml Pseudomonas aeruginosa MDR*  --   --    WOUND CULTURE   --  2+ Growth of Methicillin Resistant Staphylococcus aureus*  2+ Growth of  Few Colonies of Methicillin Resistant Staphylococcus aureus*  1+ Growth of  1+ Growth of Methicillin Resistant Staphylococcus aureus*  2+ Growth of  1+ Growth of Methicillin Resistant Staphylococcus aureus*  1+ Growth of  3+ Growth of Methicillin Resistant Staphylococcus aureus*  3+ Growth of  1+ Growth of  Few Colonies of  --   --   --        Last 24 Hours Medication List:   Current Facility-Administered Medications   Medication Dose Route Frequency Provider Last Rate    acetaminophen  650 mg Rectal Q6H PRN Marzena Mendez MD      albuterol  2 puff Inhalation Q6H PRN Jersey Pretty DO      apixaban  5 mg Oral BID Cathy Ribeiro MD       cholecalciferol  1,000 Units Oral Daily [place] Pretty, DO      clobetasol   Topical BID [place] Pretty, DO      hydrocortisone sodium succinate  50 mg Intravenous Q8H [facility] Batsheva Whitney, DO      hydroxychloroquine  200 mg Oral BID Jersey Pretty, DO      lidocaine  1 patch Topical Daily Jersey Pretty, DO      linezolid  600 mg Oral Q12H [facility] Bhavna Cunningham, DO      metoprolol tartrate  12.5 mg Oral Q12H [facility] Cathy Ribeiro MD      miconazole   Topical BID Jersey Pretty, DO      multi-electrolyte  50 mL/hr Intravenous Continuous Cathy Ribeiro MD 50 mL/hr (06/10/24 0621)    mupirocin   Topical BID Petra Velarde MD      nystatin   Topical BID Jersey Pretty, DO      pantoprazole  40 mg Intravenous Q24H [facility] Marzena Mendez MD      polyethylene glycol  17 g Oral Daily PRN Jersey Pretty, DO      senna-docusate sodium  1 tablet Oral Daily Jersey Pretty, DO      triamcinolone   Topical BID Jersey Pretty, DO      vancomycin  10 mg/kg (Adjusted) Intravenous Q24H Bhavna Cunningham,  mg (06/10/24 0917)    zinc sulfate  220 mg Oral Daily Jersey Pretty, DO          Today, Patient Was Seen By: Cathy Ribeiro MD    **Please Note: This note may have been constructed using a voice recognition system.**

## 2024-06-11 NOTE — PROGRESS NOTES
Progress Note - Infectious Disease   Bhavna Al 73 y.o. female MRN: [id number]  Unit/Bed#: [facility] 820-01 Encounter: [id number]      Impression/Plan:  Gram Positive Bacteremia: patient has fever and tachycardia in the setting of chronic immunosuppression and blood cultures grew gram positive cocci in clusters, patient currently meets SIRS, unclear source of infection, possibility of a occult source, possibility of the skin, UTI, spine as the source. Negative for pneumonia  Continue Vancomycin  Monitor vancomycin levels   TTE was technically difficult study, no need for JACINTO at this time   1/2 blood cultures from admission grew MRSA   Follow up repeat blood culture  SIRS: with fever and tachycardia, suspected 2/2 gram positive bacteremia  Antibiotics as above, on stress dose steroids  Endocrinology following for possible adrenal insufficiency in the setting of chronic steroid use  Trend CBC with differential tand BMP to make sure developing no toxicities  Supportive care  Acute Encephalopathy  Monitor cognition, antibiotics as above  Psoriasis  Dermatology following  Obtained punch biopsy and wound cultures, follow up pathology  Possible concern for SSSS  Linezolid to decrease toxin formation  Rheumatoid Arthritis  On home hydroxycloroquine and prednisone twice daily   Low threshold to start stress dose steroids if she decompensates  Acute Kidney Injury: likely prerenal in the setting of decreased PO intake at the nursing home and the presence of atrial fibrillation with RVR  Trend BMP, dose adjusted antibiotics  Back pain  MRI TSP: Findings suspicious for discitis/osteomyelitis at T9-T10 MRI   LSP: No evidence of discitis/osteomyelitis   Neurosurgery consulted, recommended conservative management for acute OM of the thoracic spine, ordered baseline X-rays   Defer bracing in the setting of chronic skin changes    Antibiotics:  Vancomycin 750mg daily   Linezolid 600mg PO q12    Subjective:  Patient has no fever, chills,  sweats; no nausea, vomiting, diarrhea; no cough, shortness of breath; no pain. No new symptoms.    Objective:  Vitals:  Temp:  [98 °F (36.7 °C)-98.3 °F (36.8 °C)] 98 °F (36.7 °C)  HR:  [64-80] 64  Resp:  [16-22] 16  BP: (124-138)/(52-86) 138/86  SpO2:  [98 %-99 %] 99 %  Temp (24hrs), Av.1 °F (36.7 °C), Min:98 °F (36.7 °C), Max:98.3 °F (36.8 °C)  Current: Temperature: 98 °F (36.7 °C)    Physical Exam:   General Appearance:  Alert, interactive, nontoxic, no acute distress.   Throat: Oropharynx moist without lesions.    Lungs:   Clear to auscultation bilaterally; no wheezes, rhonchi or rales; respirations unlabored   Heart:  RRR; no murmur, rub or gallop   Abdomen:   Soft, non-tender, non-distended, positive bowel sounds.     Extremities: No clubbing, cyanosis or edema   Skin: Desquamating rash       Labs, Imaging, & Other studies:   All pertinent labs and imaging studies were personally reviewed  Results from last 7 days   Lab Units 24  0540 24  0823 24  0541   WBC Thousand/uL 4.88 6.94 6.98   HEMOGLOBIN g/dL 10.7* 10.9* 11.4*   PLATELETS Thousands/uL 161 171 176     Results from last 7 days   Lab Units 24  0540 06/10/24  0523 24  1552 24  0632 24  0541 24  2059 24  1107   SODIUM mmol/L 146 146 145   < > 147   < > 139   POTASSIUM mmol/L 3.4* 3.5 3.7   < > 3.8   < > 4.5   CHLORIDE mmol/L 109* 112* 109*   < > 110*   < > 101   CO2 mmol/L 27 27 25   < > 25   < > 23   BUN mg/dL 37* 36* 36*   < > 32*   < > 27*   CREATININE mg/dL 1.29 1.40* 1.40*   < > 1.78*   < > 1.75*   EGFR ml/min/1.73sq m 40 36 36   < > 27   < > 28   CALCIUM mg/dL 9.0 9.0 8.9   < > 8.4   < > 9.8   AST U/L  --   --   --   --  21  --  34   ALT U/L  --   --   --   --  15  --  17   ALK PHOS U/L  --   --   --   --  32*  --  53    < > = values in this interval not displayed.     Results from last 7 days   Lab Units 24  1553 24  1946 24  0654 24  1720 24  1114 24  1107    BLOOD CULTURE  No Growth at 24 hrs.  --   --   --  Methicillin Resistant Staphylococcus aureus*  Staphylococcus epidermidis* No Growth After 4 Days.   GRAM STAIN RESULT   --  Rare Gram negative rods*  No polys seen*  No Polys or Bacteria seen  Rare Gram positive cocci in pairs*  Rare Gram negative rods*  Rare Epithelial Cells*  No polys seen*  No Polys or Bacteria seen  No Polys or Bacteria seen  No Polys or Bacteria seen  --   --  Gram positive cocci in clusters*  --    URINE CULTURE   --   --   --  20,000-29,000 cfu/ml Escherichia coli*  <10,000 cfu/ml Escherichia coli*  <10,000 cfu/ml Pseudomonas aeruginosa MDR*  --   --    WOUND CULTURE   --  2+ Growth of Methicillin Resistant Staphylococcus aureus*  2+ Growth of  Few Colonies of Methicillin Resistant Staphylococcus aureus*  1+ Growth of  1+ Growth of Methicillin Resistant Staphylococcus aureus*  2+ Growth of  1+ Growth of Methicillin Resistant Staphylococcus aureus*  1+ Growth of  3+ Growth of Methicillin Resistant Staphylococcus aureus*  3+ Growth of  1+ Growth of  Few Colonies of  --   --   --   --    MRSA CULTURE ONLY   --   --  Methicillin Resistant Staphylococcus aureus isolated*  This patient requires contact isolation precautions per [place] law. Contact precautions are not required in [place] for nasal surveillance cultures.  --   --   --      Results from last 7 days   Lab Units 06/07/24  1032 06/06/24  1107   PROCALCITONIN ng/ml 0.37* 0.20     Results from last 7 days   Lab Units 06/11/24  0540   CRP mg/L 38.6*

## 2024-06-11 NOTE — ASSESSMENT & PLAN NOTE
MRI with T9-T10 discitis/vertebral osteomyelitis without epidural collection  Per neurosurgery - defer bracing in the setting of extensive skin changes  Antibiotics as above

## 2024-06-11 NOTE — QUICK NOTE
Suspected secondary adrenal insufficiency in the setting of long term steroid use follow-up    Patient's chronic home prednisone dose is 5 mg twice daily, which is equivalent to 40 mg TDD hydrocortisone    Initiated on taper of stress dose steroid yesterday, with hydrocortisone 50 mg Q8    Recommend the following hydrocortisone taper:   -50 mg every 12 hours tomorrow Wednesday 6/12   -25 mg every 12 hours Thursday 6/13   -At this point, would reach steroid dose Equivalents to home prednisone dosing however without the anti-inflammatory glucocorticoid effect of prednisone .  Would then recommend transition to prednisone, whether at home dosing or different to be determined by primary versus consideration of rheumatological input    Endocrinology will sign off, please reconsult as needed

## 2024-06-11 NOTE — MALNUTRITION/BMI
This medical record reflects one or more clinical indicators suggestive of morbid obesity.                                     BMI Findings:  Adult BMI Classifications: Morbid Obesity 40-44.9        Body mass index is 40.81 kg/m².     See Nutrition note dated 6/11/24 for additional details.  Completed nutrition assessment is viewable in the nutrition documentation.

## 2024-06-11 NOTE — PLAN OF CARE
Problem: Nutrition/Hydration-ADULT  Goal: Nutrient/Hydration intake appropriate for improving, restoring or maintaining nutritional needs  Description: Monitor and assess patient's nutrition/hydration status for malnutrition. Collaborate with interdisciplinary team and initiate plan and interventions as ordered.  Monitor patient's weight and dietary intake as ordered or per policy. Utilize nutrition screening tool and intervene as necessary. Determine patient's food preferences and provide high-protein, high-caloric foods as appropriate.     INTERVENTIONS:  - Monitor oral intake, urinary output, labs, and treatment plans  - Assess nutrition and hydration status and recommend course of action  - Evaluate amount of meals eaten  - Assist patient with eating if necessary   - Allow adequate time for meals  - Recommend/ encourage appropriate diets, oral nutritional supplements, and vitamin/mineral supplements  - Order, calculate, and assess calorie counts as needed  - Recommend, monitor, and adjust tube feedings and TPN/PPN based on assessed needs  - Assess need for intravenous fluids  - Provide specific nutrition/hydration education as appropriate  - Include patient/family/caregiver in decisions related to nutrition  Outcome: Progressing (limited progress); rx for oral nutrition supplements; RD encouraged same

## 2024-06-11 NOTE — ASSESSMENT & PLAN NOTE
Maintained at home with metoprolol tartrate 25mg BID; was changed to IV as NPO - changed back to lower dose PO as BP tolerates  Continue Eliquis

## 2024-06-11 NOTE — PROGRESS NOTES
Bhavna Al is a 73 y.o. female who is currently ordered Vancomycin IV with management by the Pharmacy Consult service.  Relevant clinical data and objective / subjective history reviewed.  Vancomycin Assessment:  Indication and Goal AUC/Trough: Soft tissue (goal -600, trough >10), -600, trough >10  Clinical Status: stable  Micro:     Renal Function:  SCr: 1.29 mg/dL  CrCl: 43.7 mL/min  Renal replacement: Not on dialysis  Days of Therapy: 6  Current Dose: 750 mg IV q24h  Vancomycin Plan:  New Dosing: continue current dosing  Estimated AUC: 461 mcg*hr/mL  Estimated Trough: 14.4 mcg/mL  Next Level: 6/13 am labs   Renal Function Monitoring: Daily BMP and UOP  Pharmacy will continue to follow closely for s/sx of nephrotoxicity, infusion reactions and appropriateness of therapy.  BMP and CBC will be ordered per protocol. We will continue to follow the patient’s culture results and clinical progress daily.    Kelton Armenta, Pharmacist

## 2024-06-11 NOTE — ASSESSMENT & PLAN NOTE
Lab Results   Component Value Date    EGFR 36 06/10/2024    EGFR 36 06/09/2024    EGFR 33 06/09/2024    CREATININE 1.40 (H) 06/10/2024    CREATININE 1.40 (H) 06/09/2024    CREATININE 1.50 (H) 06/09/2024     CKD 3 at baseline with baseline Cr 0.8-1.0  AMANDA on admission with creatinine 1.75 and worsened to 1.85  Likely prerenal azotemia due to reduced p.o. intake for several days prior to admission  Creatinine improving   Continue gentle IVF's, hold diuretic

## 2024-06-11 NOTE — ASSESSMENT & PLAN NOTE
MRI as above  Per neurosurgery - defer bracing in the setting of extensive skin changes  Antibiotics as above

## 2024-06-11 NOTE — TELEPHONE ENCOUNTER
06/13/2024- PT STILL IN HOSPITAL    06/11/2024- PT IN [facility]  07/09/2024- 4 WK HFU W/ AP W/ thoracic spine x-ray     [facility]  Patient needs 4-week hospital follow-up with AP with thoracic spine x-ray

## 2024-06-11 NOTE — ASSESSMENT & PLAN NOTE
Presented on 6/6/24 with generalized weakness, requiring increased assistance with ADL's, back pain and poor oral intake  Had a similar presentation in 1/2024 due to SSSS with septic shock and gram positive bacteremia requiringh prolonged IV antibiotics  Was initially begun on Cefepime and Vancomycin  1 of 2 blood cultures positive for MRSA and coagulase-negative staphylococcus  Cefepime discontinued on 6/7/24 and Vancomycin continued  MRI thoracic spine (6/9/24) - Findings suspicious for discitis/osteomyelitis at T9-T10. No epidural collection.  Wound cultures - staph aureus  TTE - no vegetations  Linezolid added on 6/10/24   Repeat blood culture from 6/10/2024 negative at 24 hours  Monitor temperature, WBC, follow-up repeat blood cultures  Plan discussed with infectious disease

## 2024-06-11 NOTE — ASSESSMENT & PLAN NOTE
Presented on 6/6/24 with generalized weakness, requiring increased assistance with ADL's, back pain and poor oral intake  Had a similar presentation in 1/2024 due to SSSS with septic shock and gram positive bacteremia requiring prolonged IV antibiotics  Was initially begun on Cefepime and Vancomycin  1 of 2 blood cultures positive for MRSA and coagulase-negative staphylococcus  Cefepime discontinued on 6/7/24 and Vancomycin continued  MRI thoracic spine (6/9/24) - Findings suspicious for discitis/osteomyelitis at T9-T10. No epidural collection.  Wound cultures - staph aureus  TTE - no vegetations  Linezolid added on 6/10/24   Repeat blood culture from 6/10/2024 negative so far  Monitor temperature, WBC, follow-up repeat blood cultures  ID is following  Plan for PICC line when final blood cultures are negative

## 2024-06-11 NOTE — ASSESSMENT & PLAN NOTE
Lab Results   Component Value Date    EGFR 36 06/10/2024    EGFR 36 06/09/2024    EGFR 33 06/09/2024    CREATININE 1.40 (H) 06/10/2024    CREATININE 1.40 (H) 06/09/2024    CREATININE 1.50 (H) 06/09/2024     CKD 3 at baseline with baseline Cr 0.8-1.0  AMANDA on admission with creatinine 1.75 and worsened to 1.85  Likely prerenal azotemia due to reduced p.o. intake for several days prior to admission  Creatinine improving   Diuretics still on hold  Discontinue IV fluid

## 2024-06-11 NOTE — ASSESSMENT & PLAN NOTE
Possible secondary adrenal insufficiency in the setting of use of chronic prednisone  Hydrocortisone being tapered as above  As dose gets closer to her home dose of prednisone 5 mg twice daily confirm dose and type of steroid with rheumatology

## 2024-06-11 NOTE — PROGRESS NOTES
[facility]  Progress Note  Name: Bhavna Al I  MRN: [id number]  Unit/Bed#: [facility] 820-01 I Date of Admission: 6/6/2024   Date of Service: 6/11/2024 I Hospital Day: 5    Assessment & Plan   * MRSA bacteremia  Assessment & Plan  Presented on 6/6/24 with generalized weakness, requiring increased assistance with ADL's, back pain and poor oral intake  Had a similar presentation in 1/2024 due to SSSS with septic shock and gram positive bacteremia requiring prolonged IV antibiotics  Was initially begun on Cefepime and Vancomycin  1 of 2 blood cultures positive for MRSA and coagulase-negative staphylococcus  Cefepime discontinued on 6/7/24 and Vancomycin continued  MRI thoracic spine (6/9/24) - Findings suspicious for discitis/osteomyelitis at T9-T10. No epidural collection.  Wound cultures - staph aureus  TTE - no vegetations  Linezolid added on 6/10/24   Repeat blood culture from 6/10/2024 negative so far  Monitor temperature, WBC, follow-up repeat blood cultures  ID is following  Plan for PICC line when final blood cultures are negative    Acute osteomyelitis of thoracic spine (HCC)  Assessment & Plan  MRI with T9-T10 discitis/vertebral osteomyelitis without epidural collection  Per neurosurgery - defer bracing in the setting of extensive skin changes  Antibiotics as above    Acute metabolic encephalopathy  Assessment & Plan  Lethargy on presentation in the setting of above  Improved  Supportive care   Plan as above    Psoriasis  Assessment & Plan  Has multiple wounds  Recent diagnosis of SSSS  S/p punch biopsy by dermatology  Wound cultures - staph aureus  Linezolid added as above      Acute kidney injury superimposed on CKD  (HCC)  Assessment & Plan  Lab Results   Component Value Date    EGFR 36 06/10/2024    EGFR 36 06/09/2024    EGFR 33 06/09/2024    CREATININE 1.40 (H) 06/10/2024    CREATININE 1.40 (H) 06/09/2024    CREATININE 1.50 (H) 06/09/2024     CKD 3 at baseline with  baseline Cr 0.8-1.0  AMANDA on admission with creatinine 1.75 and worsened to 1.85  Likely prerenal azotemia due to reduced p.o. intake for several days prior to admission  Creatinine improving   Diuretics still on hold  Discontinue IV fluid      SIRS (systemic inflammatory response syndrome) ([facility])  Assessment & Plan  With fever and tachycardia  Likely due to MRSA bacteremia  Plan as above    Pressure ulcer of left buttock, stage 3 ([facility])  Assessment & Plan  POA  Wound care    Secondary adrenal insufficiency ([facility])  Assessment & Plan  Possible secondary adrenal insufficiency in the setting of use of chronic prednisone  Hydrocortisone being tapered as above  As dose gets closer to her home dose of prednisone 5 mg twice daily confirm dose and type of steroid with rheumatology    Chronic diastolic heart failure ([facility])  Assessment & Plan  Wt Readings from Last 3 Encounters:   06/09/24 104 kg (230 lb 6.1 oz)   06/08/24 103 kg (228 lb)   02/19/24 113 kg (249 lb)     Last EF 65% in 1/2024 although poor windows; prior TTE in 11/2022 with EF 55%  Home Lasix held as hypovolemic/AMANDA  Monitor volume status      Paroxysmal atrial fibrillation ([facility])  Assessment & Plan  Maintained at home with metoprolol tartrate 25mg BID  Continue Eliquis    Essential hypertension  Assessment & Plan  home Lopressor 25 mg twice daily  Continue current dose of Lopressor    GERD (gastroesophageal reflux disease)  Assessment & Plan  Continue home PPI    Rheumatoid arthritis ([facility])  Assessment & Plan  Follows with rheumatology outpatient  Continue home hydroxychloroquine 200 mg twice daily   Prednisone 5 mg twice daily held as on stress dose steroids                VTE Pharmacologic Prophylaxis: VTE Score: 7 High Risk (Score >/= 5) - Pharmacological DVT Prophylaxis Ordered: apixaban (Eliquis). Sequential Compression Devices Ordered.    Mobility:   Basic Mobility Inpatient Raw Score: 6  -[facility] Goal: 2: Bed activities/Dependent transfer  -[facility] Achieved: 2:  Bed activities/Dependent transfer  -[facility] Goal achieved. Continue to encourage appropriate mobility.    Patient Centered Rounds: I performed bedside rounds with nursing staff today.   Discussions with Specialists or Other Care Team Provider: CEDRIC HARRELL    Education and Discussions with Family / Patient:  Patient.     Total Time Spent on Date of Encounter in care of patient: 35 mins. This time was spent on one or more of the following: performing physical exam; counseling and coordination of care; obtaining or reviewing history; documenting in the medical record; reviewing/ordering tests, medications or procedures; communicating with other healthcare professionals and discussing with patient's family/caregivers.    Current Length of Stay: 5 day(s)  Current Patient Status: Inpatient   Certification Statement: The patient will continue to require additional inpatient hospital stay due to management of MRSA bacteremia      Code Status: Level 1 - Full Code    Subjective:   Patient seen and examined  Comfortable in bed eating breakfast  Denied chest pain or shortness of breath  No Event overnight    Objective:     Vitals:   Temp (24hrs), Av.2 °F (36.8 °C), Min:98 °F (36.7 °C), Max:98.4 °F (36.9 °C)    Temp:  [98 °F (36.7 °C)-98.4 °F (36.9 °C)] 98.4 °F (36.9 °C)  HR:  [64-80] 69  Resp:  [12-22] 12  BP: (124-138)/(52-86) 130/72  SpO2:  [98 %-100 %] 100 %  Body mass index is 40.81 kg/m².     Input and Output Summary (last 24 hours):     Intake/Output Summary (Last 24 hours) at 2024 1222  Last data filed at 2024 0541  Gross per 24 hour   Intake 120 ml   Output 700 ml   Net -580 ml       Physical Exam:   Physical Exam   Patient is awake alert, eating breakfast  Obese comfortable in bed in no acute distress  Lung clear to auscultation bilateral  Heart positive S1 S2 no murmur  Abdomen soft nontender  Lower extremities no edema    Additional Data:     Labs:  Results from last 7 days   Lab Units 24  0579  06/09/24  0823 06/08/24  0541   WBC Thousand/uL 4.88   < > 6.98   HEMOGLOBIN g/dL 10.7*   < > 11.4*   HEMATOCRIT % 34.7*   < > 37.5   PLATELETS Thousands/uL 161   < > 176   SEGS PCT %  --   --  89*   LYMPHO PCT %  --   --  6*   MONO PCT %  --   --  4   EOS PCT %  --   --  0    < > = values in this interval not displayed.     Results from last 7 days   Lab Units 06/11/24  0540 06/09/24  0632 06/08/24  0541   SODIUM mmol/L 146   < > 147   POTASSIUM mmol/L 3.4*   < > 3.8   CHLORIDE mmol/L 109*   < > 110*   CO2 mmol/L 27   < > 25   BUN mg/dL 37*   < > 32*   CREATININE mg/dL 1.29   < > 1.78*   ANION GAP mmol/L 10   < > 12   CALCIUM mg/dL 9.0   < > 8.4   ALBUMIN g/dL  --   --  2.4*   TOTAL BILIRUBIN mg/dL  --   --  0.42   ALK PHOS U/L  --   --  32*   ALT U/L  --   --  15   AST U/L  --   --  21   GLUCOSE RANDOM mg/dL 98   < > 89    < > = values in this interval not displayed.                 Results from last 7 days   Lab Units 06/07/24  2059 06/07/24  1032 06/06/24  1114 06/06/24  1107   LACTIC ACID mmol/L 1.4  --  4.2*  --    PROCALCITONIN ng/ml  --  0.37*  --  0.20       Lines/Drains:  Invasive Devices       Central Venous Catheter Line  Duration             CVC Central Lines 06/07/24 Double Right Internal jugular 3 days                    Central Line:  Goal for removal: Will discontinue when peripheral access obtained.              Imaging: No pertinent imaging reviewed.    Recent Cultures (last 7 days):   Results from last 7 days   Lab Units 06/09/24  1553 06/07/24  1946 06/06/24  1720 06/06/24  1114 06/06/24  1107   BLOOD CULTURE  No Growth at 24 hrs.  --   --  Methicillin Resistant Staphylococcus aureus*  Staphylococcus epidermidis* No Growth After 4 Days.   GRAM STAIN RESULT   --  Rare Gram negative rods*  No polys seen*  No Polys or Bacteria seen  Rare Gram positive cocci in pairs*  Rare Gram negative rods*  Rare Epithelial Cells*  No polys seen*  No Polys or Bacteria seen  No Polys or Bacteria seen  No  Polys or Bacteria seen  --  Gram positive cocci in clusters*  --    URINE CULTURE   --   --  20,000-29,000 cfu/ml Escherichia coli*  <10,000 cfu/ml Escherichia coli*  <10,000 cfu/ml Pseudomonas aeruginosa MDR*  --   --    WOUND CULTURE   --  2+ Growth of Methicillin Resistant Staphylococcus aureus*  2+ Growth of  Few Colonies of Methicillin Resistant Staphylococcus aureus*  1+ Growth of  1+ Growth of Methicillin Resistant Staphylococcus aureus*  2+ Growth of  1+ Growth of Methicillin Resistant Staphylococcus aureus*  1+ Growth of  3+ Growth of Methicillin Resistant Staphylococcus aureus*  3+ Growth of  1+ Growth of  Few Colonies of  --   --   --        Last 24 Hours Medication List:   Current Facility-Administered Medications   Medication Dose Route Frequency Provider Last Rate    acetaminophen  650 mg Rectal Q6H PRN Marzena Mendez MD      albuterol  2 puff Inhalation Q6H PRN Jersey Pretty, DO      apixaban  5 mg Oral BID Cathy Ribeiro MD      cholecalciferol  1,000 Units Oral Daily Jersey Pretty, DO      clobetasol   Topical BID Jersey Pretty, DO      hydrocortisone sodium succinate  50 mg Intravenous Q8H [facility] Batsheva Whitney DO      hydroxychloroquine  200 mg Oral BID Jersey Pretty, DO      lidocaine  1 patch Topical Daily eJrsey Pretty, DO      linezolid  600 mg Oral Q12H [facility] Bhavna Cunningham, DO      metoprolol tartrate  12.5 mg Oral Q12H [facility] Cathy Ribeiro MD      miconazole   Topical BID Jersey Pretty, DO      mupirocin   Topical BID Petra Velarde MD      nystatin   Topical BID Jersey Pretty, DO      pantoprazole  40 mg Intravenous Q24H [facility] Marzena Mendez MD      polyethylene glycol  17 g Oral Daily PRN Jersey Pretty, DO      senna-docusate sodium  1 tablet Oral Daily Jersey Pretty, DO      triamcinolone   Topical BID Jersey Pretty, DO      vancomycin  10 mg/kg (Adjusted) Intravenous Q24H Bhavna Cunningham,  mg  (06/11/24 0949)    zinc sulfate  220 mg Oral Daily Jersey Pretty DO          Today, Patient Was Seen By: Umesh Kendrick DO    **Please Note: This note may have been constructed using a voice recognition system.**

## 2024-06-11 NOTE — ASSESSMENT & PLAN NOTE
In the setting of above and chronic Prednisone use at 5 mg BID  Stress dose steroids Hydrocortisone 100 mg IV q 8 hrs decreased to 50 mg every 8 hours   BP improved  Taper steroids as able

## 2024-06-11 NOTE — PLAN OF CARE
Problem: PAIN - ADULT  Goal: Verbalizes/displays adequate comfort level or baseline comfort level  Description: Interventions:  - Encourage patient to monitor pain and request assistance  - Assess pain using appropriate pain scale  - Administer analgesics based on type and severity of pain and evaluate response  - Implement non-pharmacological measures as appropriate and evaluate response  - Consider cultural and social influences on pain and pain management  - Notify physician/advanced practitioner if interventions unsuccessful or patient reports new pain  Outcome: Progressing     Problem: INFECTION - ADULT  Goal: Absence or prevention of progression during hospitalization  Description: INTERVENTIONS:  - Assess and monitor for signs and symptoms of infection  - Monitor lab/diagnostic results  - Monitor all insertion sites, i.e. indwelling lines, tubes, and drains  - Monitor endotracheal if appropriate and nasal secretions for changes in amount and color  - [place] appropriate cooling/warming therapies per order  - Administer medications as ordered  - Instruct and encourage patient and family to use good hand hygiene technique  - Identify and instruct in appropriate isolation precautions for identified infection/condition  Outcome: Progressing  Goal: Absence of fever/infection during neutropenic period  Description: INTERVENTIONS:  - Monitor WBC    Outcome: Progressing     Problem: SAFETY ADULT  Goal: Patient will remain free of falls  Description: INTERVENTIONS:  - Educate patient/family on patient safety including physical limitations  - Instruct patient to call for assistance with activity   - Consult OT/PT to assist with strengthening/mobility   - Keep Call bell within reach  - Keep bed low and locked with side rails adjusted as appropriate  - Keep care items and personal belongings within reach  - Initiate and maintain comfort rounds  - Make Fall Risk Sign visible to staff  - Apply yellow socks and bracelet  for high fall risk patients  - Consider moving patient to room near nurses station  Outcome: Progressing  Goal: Maintain or return to baseline ADL function  Description: INTERVENTIONS:  -  Assess patient's ability to carry out ADLs; assess patient's baseline for ADL function and identify physical deficits which impact ability to perform ADLs (bathing, care of mouth/teeth, toileting, grooming, dressing, etc.)  - Assess/evaluate cause of self-care deficits   - Assess range of motion  - Assess patient's mobility; develop plan if impaired  - Assess patient's need for assistive devices and provide as appropriate  - Encourage maximum independence but intervene and supervise when necessary  - Involve family in performance of ADLs  - Assess for home care needs following discharge   - Consider OT consult to assist with ADL evaluation and planning for discharge  - Provide patient education as appropriate  Outcome: Progressing  Goal: Maintains/Returns to pre admission functional level  Description: INTERVENTIONS:  - Perform AM-PAC 6 Click Basic Mobility/ Daily Activity assessment daily.  - Set and communicate daily mobility goal to care team and patient/family/caregiver.   - Collaborate with rehabilitation services on mobility goals if consulted  - Out of bed for toileting  - Record patient progress and toleration of activity level   Outcome: Progressing     Problem: Prexisting or High Potential for Compromised Skin Integrity  Goal: Skin integrity is maintained or improved  Description: INTERVENTIONS:  - Identify patients at risk for skin breakdown  - Assess and monitor skin integrity  - Assess and monitor nutrition and hydration status  - Monitor labs   - Assess for incontinence   - Turn and reposition patient  - Assist with mobility/ambulation  - Relieve pressure over bony prominences  - Avoid friction and shearing  - Provide appropriate hygiene as needed including keeping skin clean and dry  - Evaluate need for skin  moisturizer/barrier cream  - Collaborate with interdisciplinary team   - Patient/family teaching  - Consider wound care consult   Outcome: Progressing     Problem: Nutrition/Hydration-ADULT  Goal: Nutrient/Hydration intake appropriate for improving, restoring or maintaining nutritional needs  Description: Monitor and assess patient's nutrition/hydration status for malnutrition. Collaborate with interdisciplinary team and initiate plan and interventions as ordered.  Monitor patient's weight and dietary intake as ordered or per policy. Utilize nutrition screening tool and intervene as necessary. Determine patient's food preferences and provide high-protein, high-caloric foods as appropriate.     INTERVENTIONS:  - Monitor oral intake, urinary output, labs, and treatment plans  - Assess nutrition and hydration status and recommend course of action  - Evaluate amount of meals eaten  - Assist patient with eating if necessary   - Allow adequate time for meals  - Recommend/ encourage appropriate diets, oral nutritional supplements, and vitamin/mineral supplements  - Order, calculate, and assess calorie counts as needed  - Recommend, monitor, and adjust tube feedings and TPN/PPN based on assessed needs  - Assess need for intravenous fluids  - Provide specific nutrition/hydration education as appropriate  - Include patient/family/caregiver in decisions related to nutrition  Outcome: Progressing

## 2024-06-12 LAB
ANION GAP SERPL CALCULATED.3IONS-SCNC: 8 MMOL/L (ref 4–13)
BUN SERPL-MCNC: 32 MG/DL (ref 5–25)
CALCIUM SERPL-MCNC: 9 MG/DL (ref 8.4–10.2)
CHLORIDE SERPL-SCNC: 111 MMOL/L (ref 96–108)
CO2 SERPL-SCNC: 27 MMOL/L (ref 21–32)
CREAT SERPL-MCNC: 1.1 MG/DL (ref 0.6–1.3)
GFR SERPL CREATININE-BSD FRML MDRD: 49 ML/MIN/1.73SQ M
GLUCOSE SERPL-MCNC: 87 MG/DL (ref 65–140)
POTASSIUM SERPL-SCNC: 3.8 MMOL/L (ref 3.5–5.3)
SODIUM SERPL-SCNC: 146 MMOL/L (ref 135–147)

## 2024-06-12 PROCEDURE — C9113 INJ PANTOPRAZOLE SODIUM, VIA: HCPCS | Performed by: HOSPITALIST

## 2024-06-12 PROCEDURE — 99233 SBSQ HOSP IP/OBS HIGH 50: CPT | Performed by: INTERNAL MEDICINE

## 2024-06-12 PROCEDURE — 80048 BASIC METABOLIC PNL TOTAL CA: CPT | Performed by: INTERNAL MEDICINE

## 2024-06-12 PROCEDURE — 99232 SBSQ HOSP IP/OBS MODERATE 35: CPT | Performed by: INTERNAL MEDICINE

## 2024-06-12 RX ORDER — PREDNISONE 5 MG/1
5 TABLET ORAL 2 TIMES DAILY WITH MEALS
Status: DISCONTINUED | OUTPATIENT
Start: 2024-06-14 | End: 2024-06-18 | Stop reason: HOSPADM

## 2024-06-12 RX ADMIN — LINEZOLID 600 MG: 600 TABLET, FILM COATED ORAL at 13:06

## 2024-06-12 RX ADMIN — MICONAZOLE NITRATE: 20 CREAM TOPICAL at 09:36

## 2024-06-12 RX ADMIN — MICONAZOLE NITRATE: 20 CREAM TOPICAL at 18:17

## 2024-06-12 RX ADMIN — HYDROXYCHLOROQUINE SULFATE 200 MG: 200 TABLET ORAL at 09:36

## 2024-06-12 RX ADMIN — CLOBETASOL PROPIONATE: 0.5 CREAM TOPICAL at 18:17

## 2024-06-12 RX ADMIN — MUPIROCIN: 20 OINTMENT TOPICAL at 18:17

## 2024-06-12 RX ADMIN — TRIAMCINOLONE ACETONIDE: 1 CREAM TOPICAL at 18:17

## 2024-06-12 RX ADMIN — PANTOPRAZOLE SODIUM 40 MG: 40 INJECTION, POWDER, FOR SOLUTION INTRAVENOUS at 09:34

## 2024-06-12 RX ADMIN — Medication 1000 UNITS: at 09:34

## 2024-06-12 RX ADMIN — ZINC SULFATE 220 MG (50 MG) CAPSULE 220 MG: CAPSULE at 09:34

## 2024-06-12 RX ADMIN — HYDROXYCHLOROQUINE SULFATE 200 MG: 200 TABLET ORAL at 18:18

## 2024-06-12 RX ADMIN — CLOBETASOL PROPIONATE: 0.5 CREAM TOPICAL at 09:35

## 2024-06-12 RX ADMIN — HYDROCORTISONE SODIUM SUCCINATE 50 MG: 100 INJECTION, POWDER, FOR SOLUTION INTRAMUSCULAR; INTRAVENOUS at 05:52

## 2024-06-12 RX ADMIN — Medication 12.5 MG: at 21:51

## 2024-06-12 RX ADMIN — SENNOSIDES AND DOCUSATE SODIUM 1 TABLET: 50; 8.6 TABLET ORAL at 09:34

## 2024-06-12 RX ADMIN — Medication 12.5 MG: at 09:34

## 2024-06-12 RX ADMIN — APIXABAN 5 MG: 5 TABLET, FILM COATED ORAL at 18:17

## 2024-06-12 RX ADMIN — TRIAMCINOLONE ACETONIDE: 1 CREAM TOPICAL at 10:08

## 2024-06-12 RX ADMIN — APIXABAN 5 MG: 5 TABLET, FILM COATED ORAL at 09:34

## 2024-06-12 RX ADMIN — LIDOCAINE 5% 1 PATCH: 700 PATCH TOPICAL at 09:34

## 2024-06-12 RX ADMIN — VANCOMYCIN HYDROCHLORIDE 750 MG: 750 INJECTION, SOLUTION INTRAVENOUS at 09:46

## 2024-06-12 RX ADMIN — HYDROCORTISONE SODIUM SUCCINATE 50 MG: 100 INJECTION, POWDER, FOR SOLUTION INTRAMUSCULAR; INTRAVENOUS at 21:51

## 2024-06-12 RX ADMIN — NYSTATIN: 100000 POWDER TOPICAL at 18:17

## 2024-06-12 RX ADMIN — MUPIROCIN: 20 OINTMENT TOPICAL at 09:35

## 2024-06-12 RX ADMIN — NYSTATIN: 100000 POWDER TOPICAL at 10:08

## 2024-06-12 NOTE — ASSESSMENT & PLAN NOTE
Lab Results   Component Value Date    EGFR 49 06/12/2024    EGFR 40 06/11/2024    EGFR 36 06/10/2024    CREATININE 1.10 06/12/2024    CREATININE 1.29 06/11/2024    CREATININE 1.40 (H) 06/10/2024     CKD 3 at baseline with baseline Cr 0.8-1.0  AMANDA on admission with creatinine 1.75 and worsened to 1.85  Likely prerenal azotemia due to reduced p.o. intake for several days prior to admission  Creatinine improving   Diuretics still on hold  Discontinue IV fluid

## 2024-06-12 NOTE — ASSESSMENT & PLAN NOTE
In the setting of above and chronic Prednisone use at 5 mg BID  Improving  Continue to taper IV hydrocortisone

## 2024-06-12 NOTE — PLAN OF CARE
Problem: PAIN - ADULT  Goal: Verbalizes/displays adequate comfort level or baseline comfort level  Description: Interventions:  - Encourage patient to monitor pain and request assistance  - Assess pain using appropriate pain scale  - Administer analgesics based on type and severity of pain and evaluate response  - Implement non-pharmacological measures as appropriate and evaluate response  - Consider cultural and social influences on pain and pain management  - Notify physician/advanced practitioner if interventions unsuccessful or patient reports new pain  Outcome: Progressing     Problem: INFECTION - ADULT  Goal: Absence or prevention of progression during hospitalization  Description: INTERVENTIONS:  - Assess and monitor for signs and symptoms of infection  - Monitor lab/diagnostic results  - Monitor all insertion sites, i.e. indwelling lines, tubes, and drains  - Monitor endotracheal if appropriate and nasal secretions for changes in amount and color  - [place] appropriate cooling/warming therapies per order  - Administer medications as ordered  - Instruct and encourage patient and family to use good hand hygiene technique  - Identify and instruct in appropriate isolation precautions for identified infection/condition  Outcome: Progressing  Goal: Absence of fever/infection during neutropenic period  Description: INTERVENTIONS:  - Monitor WBC    Outcome: Progressing     Problem: SAFETY ADULT  Goal: Patient will remain free of falls  Description: INTERVENTIONS:  - Educate patient/family on patient safety including physical limitations  - Instruct patient to call for assistance with activity   - Consult OT/PT to assist with strengthening/mobility   - Keep Call bell within reach  - Keep bed low and locked with side rails adjusted as appropriate  - Keep care items and personal belongings within reach  - Initiate and maintain comfort rounds  - Make Fall Risk Sign visible to staff  - Offer Toileting every 2 Hours,  in advance of need  - Initiate/Maintain bed alarm  - Apply yellow socks and bracelet for high fall risk patients  - Consider moving patient to room near nurses station  Outcome: Progressing  Goal: Maintain or return to baseline ADL function  Description: INTERVENTIONS:  -  Assess patient's ability to carry out ADLs; assess patient's baseline for ADL function and identify physical deficits which impact ability to perform ADLs (bathing, care of mouth/teeth, toileting, grooming, dressing, etc.)  - Assess/evaluate cause of self-care deficits   - Assess range of motion  - Assess patient's mobility; develop plan if impaired  - Assess patient's need for assistive devices and provide as appropriate  - Encourage maximum independence but intervene and supervise when necessary  - Involve family in performance of ADLs  - Assess for home care needs following discharge   - Consider OT consult to assist with ADL evaluation and planning for discharge  - Provide patient education as appropriate  Outcome: Progressing  Goal: Maintains/Returns to pre admission functional level  Description: INTERVENTIONS:  - Perform AM-PAC 6 Click Basic Mobility/ Daily Activity assessment daily.  - Set and communicate daily mobility goal to care team and patient/family/caregiver.   - Collaborate with rehabilitation services on mobility goals if consulted  - Perform Range of Motion 3 times a day.  - Reposition patient every 2 hours.  - Out of bed for toileting  - Record patient progress and toleration of activity level   Outcome: Progressing     Problem: DISCHARGE PLANNING  Goal: Discharge to home or other facility with appropriate resources  Description: INTERVENTIONS:  - Identify barriers to discharge w/patient and caregiver  - Arrange for needed discharge resources and transportation as appropriate  - Identify discharge learning needs (meds, wound care, etc.)  - Arrange for interpretive services to assist at discharge as needed  - Refer to Case  Management Department for coordinating discharge planning if the patient needs post-hospital services based on physician/advanced practitioner order or complex needs related to functional status, cognitive ability, or social support system  Outcome: Progressing     Problem: Knowledge Deficit  Goal: Patient/family/caregiver demonstrates understanding of disease process, treatment plan, medications, and discharge instructions  Description: Complete learning assessment and assess knowledge base.  Interventions:  - Provide teaching at level of understanding  - Provide teaching via preferred learning methods  Outcome: Progressing     Problem: Prexisting or High Potential for Compromised Skin Integrity  Goal: Skin integrity is maintained or improved  Description: INTERVENTIONS:  - Identify patients at risk for skin breakdown  - Assess and monitor skin integrity  - Assess and monitor nutrition and hydration status  - Monitor labs   - Assess for incontinence   - Turn and reposition patient  - Assist with mobility/ambulation  - Relieve pressure over bony prominences  - Avoid friction and shearing  - Provide appropriate hygiene as needed including keeping skin clean and dry  - Evaluate need for skin moisturizer/barrier cream  - Collaborate with interdisciplinary team   - Patient/family teaching  - Consider wound care consult   Outcome: Progressing     Problem: Nutrition/Hydration-ADULT  Goal: Nutrient/Hydration intake appropriate for improving, restoring or maintaining nutritional needs  Description: Monitor and assess patient's nutrition/hydration status for malnutrition. Collaborate with interdisciplinary team and initiate plan and interventions as ordered.  Monitor patient's weight and dietary intake as ordered or per policy. Utilize nutrition screening tool and intervene as necessary. Determine patient's food preferences and provide high-protein, high-caloric foods as appropriate.     INTERVENTIONS:  - Monitor oral  intake, urinary output, labs, and treatment plans  - Assess nutrition and hydration status and recommend course of action  - Evaluate amount of meals eaten  - Assist patient with eating if necessary   - Allow adequate time for meals  - Recommend/ encourage appropriate diets, oral nutritional supplements, and vitamin/mineral supplements  - Order, calculate, and assess calorie counts as needed  - Recommend, monitor, and adjust tube feedings and TPN/PPN based on assessed needs  - Assess need for intravenous fluids  - Provide specific nutrition/hydration education as appropriate  - Include patient/family/caregiver in decisions related to nutrition  Outcome: Progressing

## 2024-06-12 NOTE — ASSESSMENT & PLAN NOTE
Wt Readings from Last 3 Encounters:   06/12/24 108 kg (238 lb 1.6 oz)   06/08/24 103 kg (228 lb)   02/19/24 113 kg (249 lb)     Last EF 65% in 1/2024 although poor windows; prior TTE in 11/2022 with EF 55%  Home Lasix held as hypovolemic/AMANDA  Monitor volume status

## 2024-06-12 NOTE — CASE MANAGEMENT
Case Management Discharge Planning Note    Patient name Bhavna Al  Location [facility] 820/[facility] 820-01 MRN [id number]  : 1950 Date 2024       Current Admission Date: 2024  Current Admission Diagnosis:MRSA bacteremia   Patient Active Problem List    Diagnosis Date Noted Date Diagnosed    Secondary adrenal insufficiency (HCC) 2024     Pressure ulcer of left buttock, stage 3 ([facility]) 2024     Acute osteomyelitis of thoracic spine ([facility]) 2024     MRSA bacteremia 2024     Hypotension 2024     Dysphagia 2024     Deep tissue injury 2024     Localized swelling on left hand 2023     Stage 3 chronic kidney disease (HCC) 2023     Febrile illness 2023     Dermatitis associated with incontinence 2023     Right shoulder pain 12/15/2022     Abnormal urinalysis 2022     Ambulatory dysfunction 2022     Hyperuricemia 2022     Acute metabolic encephalopathy 2022     Chronic diastolic heart failure ([facility]) 2022     Acute bronchitis 2022     Assistance needed with transportation 09/15/2022     Abnormal CT of the chest 2022     Gram-positive cocci bacteremia 2022     Psoriasis 2022     Elevated troponin 2022     COVID-19 2022     Acute kidney injury superimposed on CKD  ([facility]) 01/10/2022     Paroxysmal atrial fibrillation ([facility]) 01/10/2022     Hyperparathyroidism ([facility]) 2021     Murmur, cardiac 2021     BPPV (benign paroxysmal positional vertigo) 2018     Seasonal allergic rhinitis due to pollen 2018     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     Osteoporosis 2016     SIRS (systemic inflammatory response syndrome) ([facility]) 2016     Rheumatoid arthritis ([facility]) 2016     GERD (gastroesophageal reflux disease) 2016     Sarcoid 2016     Morbid obesity ([facility]) 2016     Vitamin D deficiency 2015     Essential hypertension 2014      Lumbar radiculopathy 12/04/2014       LOS (days): 6  Geometric Mean LOS (GMLOS) (days): 6.4  Days to GMLOS:0.4     OBJECTIVE:  Risk of Unplanned Readmission Score: 23.56         Current admission status: Inpatient   Preferred Pharmacy:   [id number] - [place], [place] - [address]  [address]  [place] [address]  Phone: [phone] Fax: [phone]    [facility] - [place], [place] - [facility] [facility]  [address] [facility] [address]  Phone: [phone] Fax: [phone]    Primary Care Provider: Nya Taveras DO    Primary Insurance: MEDICARE  Secondary Insurance: [facility]    DISCHARGE DETAILS:    IMM Given (Date):: 06/12/24  IMM Given to:: Family  Family notified:: Rhoda Al (Child)  [phone]  Additional Comments: CM called pt's daughter Rhoda Al (Child)  [phone]    to review IMM. Pt's daughter verbalized understanding and provided  verbal conset. A copy of IMM has been left by pt bedside for daughter to recieve. CM team to continue to follow.     CM reviewed IMM with pt; No questions or concerns. Pt in agreement with rights, and is aware of the right to appeal d/c once medically stable if desired. IMM signed.

## 2024-06-12 NOTE — PROGRESS NOTES
Bhavna Al is a 73 y.o. female who is currently ordered Vancomycin IV with management by the Pharmacy Consult service.  Relevant clinical data and objective / subjective history reviewed.  Vancomycin Assessment:  Indication and Goal AUC/Trough: Soft tissue (goal -600, trough >10), -600, trough >10  Clinical Status: stable  Micro:     Renal Function:  SCr: 1.1 mg/dL  CrCl: 51.4 mL/min  Renal replacement: Not on dialysis  Days of Therapy: 7  Current Dose: 750mg IV q24h  Vancomycin Plan:  New Dosing: continue current dosing  Estimated AUC: 408 mcg*hr/mL  Estimated Trough: 12.2 mcg/mL  Next Level: Random 6/13 at 0600  Renal Function Monitoring: Daily BMP and UOP  Pharmacy will continue to follow closely for s/sx of nephrotoxicity, infusion reactions and appropriateness of therapy.  BMP and CBC will be ordered per protocol. We will continue to follow the patient’s culture results and clinical progress daily.    Fe Fitzgerald, Pharmacist

## 2024-06-12 NOTE — PROGRESS NOTES
[facility]  Progress Note  Name: Bhavna Al I  MRN: [id number]  Unit/Bed#: [facility] 820-01 I Date of Admission: 6/6/2024   Date of Service: 6/12/2024 I Hospital Day: 6    Assessment & Plan   * MRSA bacteremia  Assessment & Plan  Presented on 6/6/24 with generalized weakness, requiring increased assistance with ADL's, back pain and poor oral intake  Had a similar presentation in 1/2024 due to SSSS with septic shock and gram positive bacteremia requiring prolonged IV antibiotics  Was initially begun on Cefepime and Vancomycin  1 of 2 blood cultures positive for MRSA and coagulase-negative staphylococcus  Cefepime discontinued on 6/7/24 and Vancomycin continued  MRI thoracic spine (6/9/24) - Findings suspicious for discitis/osteomyelitis at T9-T10. No epidural collection.  Wound cultures - staph aureus  TTE - no vegetations  Linezolid added on 6/10/24   Repeat blood culture from 6/10/2024 negative so far  Monitor temperature, WBC, follow-up repeat blood cultures  ID is following, with plan for 6 weeks of IV antibiotic  Plan for PICC line when final blood cultures are negative    Acute osteomyelitis of thoracic spine (HCC)  Assessment & Plan  MRI with T9-T10 discitis/vertebral osteomyelitis without epidural collection  Per neurosurgery - defer bracing in the setting of extensive skin changes  Antibiotics as above    Acute metabolic encephalopathy  Assessment & Plan  Lethargy on presentation in the setting of above  Improved  Supportive care   Plan as above    Psoriasis  Assessment & Plan  Has multiple wounds  Recent diagnosis of SSSS  S/p punch biopsy by dermatology  Wound cultures - staph aureus  Linezolid added as above      Acute kidney injury superimposed on CKD  (HCC)  Assessment & Plan  Lab Results   Component Value Date    EGFR 49 06/12/2024    EGFR 40 06/11/2024    EGFR 36 06/10/2024    CREATININE 1.10 06/12/2024    CREATININE 1.29 06/11/2024    CREATININE 1.40 (H) 06/10/2024      CKD 3 at baseline with baseline Cr 0.8-1.0  AMANDA on admission with creatinine 1.75 and worsened to 1.85  Likely prerenal azotemia due to reduced p.o. intake for several days prior to admission  Creatinine improving   Diuretics still on hold  Discontinue IV fluid      SIRS (systemic inflammatory response syndrome) ([facility])  Assessment & Plan  With fever and tachycardia  Likely due to MRSA bacteremia  Plan as above    Pressure ulcer of left buttock, stage 3 ([facility])  Assessment & Plan  POA  Wound care    Secondary adrenal insufficiency ([facility])  Assessment & Plan  Possible secondary adrenal insufficiency in the setting of use of chronic prednisone  Hydrocortisone being tapered as above  As dose gets closer to her home dose of prednisone 5 mg twice daily confirm dose and type of steroid with rheumatology    Dysphagia  Assessment & Plan  Diet advanced to dental soft per speech recommendation    Hypotension  Assessment & Plan  In the setting of above and chronic Prednisone use at 5 mg BID  Improving  Continue to taper IV hydrocortisone    Chronic diastolic heart failure (HCC)  Assessment & Plan  Wt Readings from Last 3 Encounters:   06/12/24 108 kg (238 lb 1.6 oz)   06/08/24 103 kg (228 lb)   02/19/24 113 kg (249 lb)     Last EF 65% in 1/2024 although poor windows; prior TTE in 11/2022 with EF 55%  Home Lasix held as hypovolemic/AMANDA  Monitor volume status      Paroxysmal atrial fibrillation (HCC)  Assessment & Plan  Maintained at home with metoprolol tartrate 25mg BID  Continue Eliquis    Essential hypertension  Assessment & Plan  home Lopressor 25 mg twice daily  Continue current dose of Lopressor    GERD (gastroesophageal reflux disease)  Assessment & Plan  Continue home PPI    Rheumatoid arthritis (HCC)  Assessment & Plan  Follows with rheumatology outpatient  Continue home hydroxychloroquine 200 mg twice daily   Prednisone 5 mg twice daily held as on stress dose steroids                VTE Pharmacologic Prophylaxis: VTE  Score: 7 High Risk (Score >/= 5) - Pharmacological DVT Prophylaxis Ordered: apixaban (Eliquis). Sequential Compression Devices Ordered.    Mobility:   Basic Mobility Inpatient Raw Score: 6  JH-HLM Goal: 2: Bed activities/Dependent transfer  JH-HLM Achieved: 2: Bed activities/Dependent transfer  JH-HLM Goal achieved. Continue to encourage appropriate mobility.    Patient Centered Rounds: I performed bedside rounds with nursing staff today.   Discussions with Specialists or Other Care Team Provider: SHARRI    Education and Discussions with Family / Patient:  Patient.     Total Time Spent on Date of Encounter in care of patient: 35 mins. This time was spent on one or more of the following: performing physical exam; counseling and coordination of care; obtaining or reviewing history; documenting in the medical record; reviewing/ordering tests, medications or procedures; communicating with other healthcare professionals and discussing with patient's family/caregivers.    Current Length of Stay: 6 day(s)  Current Patient Status: Inpatient   Certification Statement: The patient will continue to require additional inpatient hospital stay due to management of MRSA bacteremia  Discharge Plan: Anticipate discharge in 48 hrs to rehab facility.    Code Status: Level 1 - Full Code    Subjective:   Patient seen and examined  Comfortable in bed  No event overnight  No complaints    Objective:     Vitals:   Temp (24hrs), Av °F (36.7 °C), Min:97.9 °F (36.6 °C), Max:98.2 °F (36.8 °C)    Temp:  [97.9 °F (36.6 °C)-98.2 °F (36.8 °C)] 97.9 °F (36.6 °C)  HR:  [66-82] 66  Resp:  [16-20] 16  BP: (112-153)/(63-86) 131/82  SpO2:  [99 %-100 %] 100 %  Body mass index is 42.18 kg/m².     Input and Output Summary (last 24 hours):     Intake/Output Summary (Last 24 hours) at 2024 1202  Last data filed at 2024 0830  Gross per 24 hour   Intake 0 ml   Output 350 ml   Net -350 ml       Physical Exam:   Physical Exam   Patient is awake alert,  eating breakfast  Obese comfortable in bed, in no acute distress  Diffuse xerosis  Lung clear to auscultation bilateral  Heart positive S1 S2 no murmur  Abdomen soft nontender  Lower extremities no edema    Additional Data:     Labs:  Results from last 7 days   Lab Units 06/11/24  0540 06/09/24  0823 06/08/24  0541   WBC Thousand/uL 4.88   < > 6.98   HEMOGLOBIN g/dL 10.7*   < > 11.4*   HEMATOCRIT % 34.7*   < > 37.5   PLATELETS Thousands/uL 161   < > 176   SEGS PCT %  --   --  89*   LYMPHO PCT %  --   --  6*   MONO PCT %  --   --  4   EOS PCT %  --   --  0    < > = values in this interval not displayed.     Results from last 7 days   Lab Units 06/12/24  0449 06/09/24  0632 06/08/24  0541   SODIUM mmol/L 146   < > 147   POTASSIUM mmol/L 3.8   < > 3.8   CHLORIDE mmol/L 111*   < > 110*   CO2 mmol/L 27   < > 25   BUN mg/dL 32*   < > 32*   CREATININE mg/dL 1.10   < > 1.78*   ANION GAP mmol/L 8   < > 12   CALCIUM mg/dL 9.0   < > 8.4   ALBUMIN g/dL  --   --  2.4*   TOTAL BILIRUBIN mg/dL  --   --  0.42   ALK PHOS U/L  --   --  32*   ALT U/L  --   --  15   AST U/L  --   --  21   GLUCOSE RANDOM mg/dL 87   < > 89    < > = values in this interval not displayed.                 Results from last 7 days   Lab Units 06/07/24  2059 06/07/24  1032 06/06/24  1114 06/06/24  1107   LACTIC ACID mmol/L 1.4  --  4.2*  --    PROCALCITONIN ng/ml  --  0.37*  --  0.20       Lines/Drains:  Invasive Devices       Central Venous Catheter Line  Duration             CVC Central Lines 06/07/24 Double Right Internal jugular 4 days              Drain  Duration             External Urinary Catheter -- days                    Central Line:  Goal for removal: Will discontinue when peripheral access obtained.              Imaging: No pertinent imaging reviewed.    Recent Cultures (last 7 days):   Results from last 7 days   Lab Units 06/09/24  1553 06/07/24  1946 06/06/24  1720 06/06/24  1114 06/06/24  1107   BLOOD CULTURE  No Growth at 48 hrs.  --   --   Methicillin Resistant Staphylococcus aureus*  Staphylococcus epidermidis* No Growth After 5 Days.   GRAM STAIN RESULT   --  Rare Gram negative rods*  No polys seen*  No Polys or Bacteria seen  Rare Gram positive cocci in pairs*  Rare Gram negative rods*  Rare Epithelial Cells*  No polys seen*  No Polys or Bacteria seen  No Polys or Bacteria seen  No Polys or Bacteria seen  --  Gram positive cocci in clusters*  --    URINE CULTURE   --   --  20,000-29,000 cfu/ml Escherichia coli*  <10,000 cfu/ml Escherichia coli*  <10,000 cfu/ml Pseudomonas aeruginosa MDR*  --   --    WOUND CULTURE   --  2+ Growth of Methicillin Resistant Staphylococcus aureus*  2+ Growth of  Few Colonies of Methicillin Resistant Staphylococcus aureus*  1+ Growth of  1+ Growth of Methicillin Resistant Staphylococcus aureus*  2+ Growth of  1+ Growth of Methicillin Resistant Staphylococcus aureus*  1+ Growth of  3+ Growth of Methicillin Resistant Staphylococcus aureus*  3+ Growth of  1+ Growth of  Few Colonies of  --   --   --        Last 24 Hours Medication List:   Current Facility-Administered Medications   Medication Dose Route Frequency Provider Last Rate    acetaminophen  650 mg Rectal Q6H PRN Marzena Mendez MD      albuterol  2 puff Inhalation Q6H PRN Jersey Pretty, DO      apixaban  5 mg Oral BID Cathy Ribeiro MD      cholecalciferol  1,000 Units Oral Daily Jersey Pretty, DO      clobetasol   Topical BID Jersey Pretty DO      hydrocortisone sodium succinate  50 mg Intravenous Q12H FAMILIA Kendrick DO      Followed by    [START ON 6/13/2024] hydrocortisone sodium succinate  25 mg Intravenous Q12H [facility] Umesh Kendrick DO      hydroxychloroquine  200 mg Oral BID Jersey Pretty DO      lidocaine  1 patch Topical Daily Jersey Pretty DO      linezolid  600 mg Oral Q12H [facility] Bhavna Cunningham DO      metoprolol tartrate  12.5 mg Oral Q12H [facility] Cathy Ribeiro MD      miconazole   Topical  BID Jersey Pretty DO      mupirocin   Topical BID Petra Velarde MD      nystatin   Topical BID Jersey Pretty DO      pantoprazole  40 mg Intravenous Q24H FAMILIA Mendez MD      polyethylene glycol  17 g Oral Daily PRN Jersey Pretty DO      [START ON 6/14/2024] predniSONE  5 mg Oral BID With Meals Umesh Kendrick DO      senna-docusate sodium  1 tablet Oral Daily Jersey Pretty DO      triamcinolone   Topical BID Jersey Pretty DO      vancomycin  10 mg/kg (Adjusted) Intravenous Q24H Bhavna NelleaDO 750 mg (06/12/24 0946)    zinc sulfate  220 mg Oral Daily Jersey Pretty DO          Today, Patient Was Seen By: Umesh Kendrick DO    **Please Note: This note may have been constructed using a voice recognition system.**

## 2024-06-12 NOTE — PROGRESS NOTES
Progress Note - Infectious Disease   Bhavna Al 73 y.o. female MRN: [id number]  Unit/Bed#: [facility] 820-01 Encounter: [id number]      Impression/Plan:  Gram Positive Bacteremia: patient has fever and tachycardia in the setting of chronic immunosuppression and blood cultures grew gram positive cocci in clusters, patient currently meets SIRS, unclear source of infection, possibility of a occult source, possibility of the skin, UTI, spine as the source. Negative for pneumonia  Continue Vancomycin  Monitor vancomycin levels   TTE was technically difficult study, no need for JACINTO at this time   1/2 blood cultures from admission grew MRSA   Follow up repeat blood culture - NGTD  Plan for 6 weeks of IV antibiotics  Plan for PICC line when final blood cultures are negative  SIRS: with fever and tachycardia, suspected 2/2 gram positive bacteremia  Antibiotics as above, on stress dose steroids  Endocrinology following for possible adrenal insufficiency in the setting of chronic steroid use  Trend CBC with differential tand BMP to make sure developing no toxicities  Supportive care  Acute Encephalopathy  Monitor cognition, antibiotics as above  Psoriasis  Dermatology following  Obtained punch biopsy and wound cultures, follow up pathology  Possible concern for SSSS  Linezolid to decrease toxin formation  Rheumatoid Arthritis  On home hydroxycloroquine and prednisone twice daily   Low threshold to start stress dose steroids if she decompensates  Acute Kidney Injury: likely prerenal in the setting of decreased PO intake at the nursing home and the presence of atrial fibrillation with RVR  Trend BMP, dose adjusted antibiotics  Back pain  MRI TSP: Findings suspicious for discitis/osteomyelitis at T9-T10 MRI   LSP: No evidence of discitis/osteomyelitis   Neurosurgery consulted, recommended conservative management for acute OM of the thoracic spine, ordered baseline X-rays   Defer bracing in the setting of chronic skin changes  8.     Secondary Adrenal Insufficiency: suspected 2/2 chronic steroid use, started on stress dose steroids  a.    Endocrine - recommended steroid taper, signed off    Antibiotics:  Vancomycin 750mg daily   Linezolid 600mg PO q12    Subjective:  Patient has no fever, chills, sweats; no nausea, vomiting, diarrhea; no cough, shortness of breath; no pain. No new symptoms.    Objective:  Vitals:  Temp:  [97.9 °F (36.6 °C)-98.4 °F (36.9 °C)] 97.9 °F (36.6 °C)  HR:  [66-82] 66  Resp:  [12-20] 16  BP: (112-153)/(63-86) 131/82  SpO2:  [99 %-100 %] 100 %  Temp (24hrs), Av.1 °F (36.7 °C), Min:97.9 °F (36.6 °C), Max:98.4 °F (36.9 °C)  Current: Temperature: 97.9 °F (36.6 °C)    Physical Exam:   General Appearance:  Alert, interactive, nontoxic, no acute distress.   Throat: Oropharynx moist without lesions.    Lungs:   Clear to auscultation bilaterally; no wheezes, rhonchi or rales; respirations unlabored   Heart:  RRR; no murmur, rub or gallop   Abdomen:   Soft, non-tender, non-distended, positive bowel sounds.     Extremities: No clubbing, cyanosis or edema   Skin: Desquamating rash, improved from yesterday       Labs, Imaging, & Other studies:   All pertinent labs and imaging studies were personally reviewed  Results from last 7 days   Lab Units 24  0540 24  0823 24  0541   WBC Thousand/uL 4.88 6.94 6.98   HEMOGLOBIN g/dL 10.7* 10.9* 11.4*   PLATELETS Thousands/uL 161 171 176     Results from last 7 days   Lab Units 24  0449 24  0540 06/10/24  0523 24  0632 24  0541 24  2059 24  1107   SODIUM mmol/L 146 146 146   < > 147   < > 139   POTASSIUM mmol/L 3.8 3.4* 3.5   < > 3.8   < > 4.5   CHLORIDE mmol/L 111* 109* 112*   < > 110*   < > 101   CO2 mmol/L 27 27 27   < > 25   < > 23   BUN mg/dL 32* 37* 36*   < > 32*   < > 27*   CREATININE mg/dL 1.10 1.29 1.40*   < > 1.78*   < > 1.75*   EGFR ml/min/1.73sq m 49 40 36   < > 27   < > 28   CALCIUM mg/dL 9.0 9.0 9.0   < > 8.4   < > 9.8   AST  U/L  --   --   --   --  21  --  34   ALT U/L  --   --   --   --  15  --  17   ALK PHOS U/L  --   --   --   --  32*  --  53    < > = values in this interval not displayed.     Results from last 7 days   Lab Units 06/09/24  1553 06/07/24  1946 06/07/24  0654 06/06/24  1720 06/06/24  1114 06/06/24  1107   BLOOD CULTURE  No Growth at 48 hrs.  --   --   --  Methicillin Resistant Staphylococcus aureus*  Staphylococcus epidermidis* No Growth After 5 Days.   GRAM STAIN RESULT   --  Rare Gram negative rods*  No polys seen*  No Polys or Bacteria seen  Rare Gram positive cocci in pairs*  Rare Gram negative rods*  Rare Epithelial Cells*  No polys seen*  No Polys or Bacteria seen  No Polys or Bacteria seen  No Polys or Bacteria seen  --   --  Gram positive cocci in clusters*  --    URINE CULTURE   --   --   --  20,000-29,000 cfu/ml Escherichia coli*  <10,000 cfu/ml Escherichia coli*  <10,000 cfu/ml Pseudomonas aeruginosa MDR*  --   --    WOUND CULTURE   --  2+ Growth of Methicillin Resistant Staphylococcus aureus*  2+ Growth of  Few Colonies of Methicillin Resistant Staphylococcus aureus*  1+ Growth of  1+ Growth of Methicillin Resistant Staphylococcus aureus*  2+ Growth of  1+ Growth of Methicillin Resistant Staphylococcus aureus*  1+ Growth of  3+ Growth of Methicillin Resistant Staphylococcus aureus*  3+ Growth of  1+ Growth of  Few Colonies of  --   --   --   --    MRSA CULTURE ONLY   --   --  Methicillin Resistant Staphylococcus aureus isolated*  This patient requires contact isolation precautions per [place] law. Contact precautions are not required in [place] for nasal surveillance cultures.  --   --   --      Results from last 7 days   Lab Units 06/07/24  1032 06/06/24  1107   PROCALCITONIN ng/ml 0.37* 0.20     Results from last 7 days   Lab Units 06/11/24  0540   CRP mg/L 38.6*

## 2024-06-12 NOTE — ASSESSMENT & PLAN NOTE
Presented on 6/6/24 with generalized weakness, requiring increased assistance with ADL's, back pain and poor oral intake  Had a similar presentation in 1/2024 due to SSSS with septic shock and gram positive bacteremia requiring prolonged IV antibiotics  Was initially begun on Cefepime and Vancomycin  1 of 2 blood cultures positive for MRSA and coagulase-negative staphylococcus  Cefepime discontinued on 6/7/24 and Vancomycin continued  MRI thoracic spine (6/9/24) - Findings suspicious for discitis/osteomyelitis at T9-T10. No epidural collection.  Wound cultures - staph aureus  TTE - no vegetations  Linezolid added on 6/10/24   Repeat blood culture from 6/10/2024 negative so far  Monitor temperature, WBC, follow-up repeat blood cultures  ID is following, with plan for 6 weeks of IV antibiotic  Plan for PICC line when final blood cultures are negative   No

## 2024-06-13 LAB — VANCOMYCIN SERPL-MCNC: 17.5 UG/ML (ref 10–20)

## 2024-06-13 PROCEDURE — C9113 INJ PANTOPRAZOLE SODIUM, VIA: HCPCS | Performed by: HOSPITALIST

## 2024-06-13 PROCEDURE — 80202 ASSAY OF VANCOMYCIN: CPT | Performed by: INTERNAL MEDICINE

## 2024-06-13 PROCEDURE — 99233 SBSQ HOSP IP/OBS HIGH 50: CPT | Performed by: INTERNAL MEDICINE

## 2024-06-13 PROCEDURE — 99232 SBSQ HOSP IP/OBS MODERATE 35: CPT | Performed by: INTERNAL MEDICINE

## 2024-06-13 RX ADMIN — HYDROXYCHLOROQUINE SULFATE 200 MG: 200 TABLET ORAL at 09:19

## 2024-06-13 RX ADMIN — ZINC SULFATE 220 MG (50 MG) CAPSULE 220 MG: CAPSULE at 09:03

## 2024-06-13 RX ADMIN — CLOBETASOL PROPIONATE: 0.5 CREAM TOPICAL at 09:19

## 2024-06-13 RX ADMIN — CLOBETASOL PROPIONATE: 0.5 CREAM TOPICAL at 17:30

## 2024-06-13 RX ADMIN — APIXABAN 5 MG: 5 TABLET, FILM COATED ORAL at 09:03

## 2024-06-13 RX ADMIN — MICONAZOLE NITRATE: 20 CREAM TOPICAL at 09:19

## 2024-06-13 RX ADMIN — HYDROCORTISONE SODIUM SUCCINATE 25 MG: 100 INJECTION, POWDER, FOR SOLUTION INTRAMUSCULAR; INTRAVENOUS at 20:50

## 2024-06-13 RX ADMIN — TRIAMCINOLONE ACETONIDE: 1 CREAM TOPICAL at 09:19

## 2024-06-13 RX ADMIN — HYDROCORTISONE SODIUM SUCCINATE 50 MG: 100 INJECTION, POWDER, FOR SOLUTION INTRAMUSCULAR; INTRAVENOUS at 09:03

## 2024-06-13 RX ADMIN — HYDROXYCHLOROQUINE SULFATE 200 MG: 200 TABLET ORAL at 17:30

## 2024-06-13 RX ADMIN — VANCOMYCIN HYDROCHLORIDE 750 MG: 750 INJECTION, SOLUTION INTRAVENOUS at 09:04

## 2024-06-13 RX ADMIN — NYSTATIN: 100000 POWDER TOPICAL at 17:30

## 2024-06-13 RX ADMIN — MUPIROCIN: 20 OINTMENT TOPICAL at 17:31

## 2024-06-13 RX ADMIN — LIDOCAINE 5% 1 PATCH: 700 PATCH TOPICAL at 09:03

## 2024-06-13 RX ADMIN — Medication 12.5 MG: at 20:50

## 2024-06-13 RX ADMIN — APIXABAN 5 MG: 5 TABLET, FILM COATED ORAL at 17:30

## 2024-06-13 RX ADMIN — MICONAZOLE NITRATE: 20 CREAM TOPICAL at 17:30

## 2024-06-13 RX ADMIN — Medication 1000 UNITS: at 09:03

## 2024-06-13 RX ADMIN — NYSTATIN: 100000 POWDER TOPICAL at 09:19

## 2024-06-13 RX ADMIN — TRIAMCINOLONE ACETONIDE: 1 CREAM TOPICAL at 17:31

## 2024-06-13 RX ADMIN — MUPIROCIN: 20 OINTMENT TOPICAL at 09:19

## 2024-06-13 RX ADMIN — SENNOSIDES AND DOCUSATE SODIUM 1 TABLET: 50; 8.6 TABLET ORAL at 09:03

## 2024-06-13 RX ADMIN — Medication 12.5 MG: at 09:03

## 2024-06-13 RX ADMIN — PANTOPRAZOLE SODIUM 40 MG: 40 INJECTION, POWDER, FOR SOLUTION INTRAVENOUS at 09:03

## 2024-06-13 NOTE — ASSESSMENT & PLAN NOTE
Wt Readings from Last 3 Encounters:   06/12/24 108 kg (238 lb 1.6 oz)   06/08/24 103 kg (228 lb)   02/19/24 113 kg (249 lb)     Last EF 65% in 1/2024 although poor windows; prior TTE in 11/2022 with EF 55%  Home Lasix held as hypovolemic/AMANDA  Resume as able  Monitor volume status

## 2024-06-13 NOTE — PLAN OF CARE
Problem: PAIN - ADULT  Goal: Verbalizes/displays adequate comfort level or baseline comfort level  Description: Interventions:  - Encourage patient to monitor pain and request assistance  - Assess pain using appropriate pain scale  - Administer analgesics based on type and severity of pain and evaluate response  - Implement non-pharmacological measures as appropriate and evaluate response  - Consider cultural and social influences on pain and pain management  - Notify physician/advanced practitioner if interventions unsuccessful or patient reports new pain  Outcome: Progressing     Problem: INFECTION - ADULT  Goal: Absence or prevention of progression during hospitalization  Description: INTERVENTIONS:  - Assess and monitor for signs and symptoms of infection  - Monitor lab/diagnostic results  - Monitor all insertion sites, i.e. indwelling lines, tubes, and drains  - Monitor endotracheal if appropriate and nasal secretions for changes in amount and color  - Forbes appropriate cooling/warming therapies per order  - Administer medications as ordered  - Instruct and encourage patient and family to use good hand hygiene technique  - Identify and instruct in appropriate isolation precautions for identified infection/condition  Outcome: Progressing  Goal: Absence of fever/infection during neutropenic period  Description: INTERVENTIONS:  - Monitor WBC    Outcome: Progressing     Problem: SAFETY ADULT  Goal: Patient will remain free of falls  Description: INTERVENTIONS:  - Educate patient/family on patient safety including physical limitations  - Instruct patient to call for assistance with activity   - Consult OT/PT to assist with strengthening/mobility   - Keep Call bell within reach  - Keep bed low and locked with side rails adjusted as appropriate  - Keep care items and personal belongings within reach  - Initiate and maintain comfort rounds  - Make Fall Risk Sign visible to staff  - Offer Toileting every 2 Hours,  in advance of need  - Initiate/Maintain bed alarm  - Apply yellow socks and bracelet for high fall risk patients  - Consider moving patient to room near nurses station  Outcome: Progressing  Goal: Maintain or return to baseline ADL function  Description: INTERVENTIONS:  -  Assess patient's ability to carry out ADLs; assess patient's baseline for ADL function and identify physical deficits which impact ability to perform ADLs (bathing, care of mouth/teeth, toileting, grooming, dressing, etc.)  - Assess/evaluate cause of self-care deficits   - Assess range of motion  - Assess patient's mobility; develop plan if impaired  - Assess patient's need for assistive devices and provide as appropriate  - Encourage maximum independence but intervene and supervise when necessary  - Involve family in performance of ADLs  - Assess for home care needs following discharge   - Consider OT consult to assist with ADL evaluation and planning for discharge  - Provide patient education as appropriate  Outcome: Progressing  Goal: Maintains/Returns to pre admission functional level  Description: INTERVENTIONS:  - Perform AM-PAC 6 Click Basic Mobility/ Daily Activity assessment daily.  - Set and communicate daily mobility goal to care team and patient/family/caregiver.   - Collaborate with rehabilitation services on mobility goals if consulted  - Perform Range of Motion 3 times a day.  - Reposition patient every 2 hours.  - Out of bed for toileting  - Record patient progress and toleration of activity level   Outcome: Progressing     Problem: DISCHARGE PLANNING  Goal: Discharge to home or other facility with appropriate resources  Description: INTERVENTIONS:  - Identify barriers to discharge w/patient and caregiver  - Arrange for needed discharge resources and transportation as appropriate  - Identify discharge learning needs (meds, wound care, etc.)  - Arrange for interpretive services to assist at discharge as needed  - Refer to Case  Management Department for coordinating discharge planning if the patient needs post-hospital services based on physician/advanced practitioner order or complex needs related to functional status, cognitive ability, or social support system  Outcome: Progressing     Problem: Knowledge Deficit  Goal: Patient/family/caregiver demonstrates understanding of disease process, treatment plan, medications, and discharge instructions  Description: Complete learning assessment and assess knowledge base.  Interventions:  - Provide teaching at level of understanding  - Provide teaching via preferred learning methods  Outcome: Progressing     Problem: Prexisting or High Potential for Compromised Skin Integrity  Goal: Skin integrity is maintained or improved  Description: INTERVENTIONS:  - Identify patients at risk for skin breakdown  - Assess and monitor skin integrity  - Assess and monitor nutrition and hydration status  - Monitor labs   - Assess for incontinence   - Turn and reposition patient  - Assist with mobility/ambulation  - Relieve pressure over bony prominences  - Avoid friction and shearing  - Provide appropriate hygiene as needed including keeping skin clean and dry  - Evaluate need for skin moisturizer/barrier cream  - Collaborate with interdisciplinary team   - Patient/family teaching  - Consider wound care consult   Outcome: Progressing     Problem: Nutrition/Hydration-ADULT  Goal: Nutrient/Hydration intake appropriate for improving, restoring or maintaining nutritional needs  Description: Monitor and assess patient's nutrition/hydration status for malnutrition. Collaborate with interdisciplinary team and initiate plan and interventions as ordered.  Monitor patient's weight and dietary intake as ordered or per policy. Utilize nutrition screening tool and intervene as necessary. Determine patient's food preferences and provide high-protein, high-caloric foods as appropriate.     INTERVENTIONS:  - Monitor oral  intake, urinary output, labs, and treatment plans  - Assess nutrition and hydration status and recommend course of action  - Evaluate amount of meals eaten  - Assist patient with eating if necessary   - Allow adequate time for meals  - Recommend/ encourage appropriate diets, oral nutritional supplements, and vitamin/mineral supplements  - Order, calculate, and assess calorie counts as needed  - Recommend, monitor, and adjust tube feedings and TPN/PPN based on assessed needs  - Assess need for intravenous fluids  - Provide specific nutrition/hydration education as appropriate  - Include patient/family/caregiver in decisions related to nutrition  Outcome: Progressing

## 2024-06-13 NOTE — ASSESSMENT & PLAN NOTE
Lab Results   Component Value Date    EGFR 49 06/12/2024    EGFR 40 06/11/2024    EGFR 36 06/10/2024    CREATININE 1.10 06/12/2024    CREATININE 1.29 06/11/2024    CREATININE 1.40 (H) 06/10/2024     CKD 3 at baseline with baseline Cr 0.8-1.0  AMANDA on admission with creatinine 1.75 and worsened to 1.85  Likely prerenal azotemia due to reduced p.o. intake for several days prior to admission  Creatinine improving   Diuretics still on hold

## 2024-06-13 NOTE — PROGRESS NOTES
Progress Note - Infectious Disease   Bhavna Al 73 y.o. female MRN: [id number]  Unit/Bed#: [facility] 820-01 Encounter: [id number]      Impression/Plan:   MRSA bacteremia  Continue vancomycin, monitor levels  1/2 blood cultures from admission grew MRSA  Follow up repeat blood cultures - NGTD 72 hours  Plan for 6 weeks of IV antibiotics  Plan for PICC line when final blood cultures are negative  T9-10 discitis/vertebral osteomyelitis. Without epidural collection seen.  Neurosurgery consulted, recommended conservative management for acute OM of the thoracic spine, ordered baseline X-rays servative management for acute OM of the thoracic spine, ordered baseline X-rays .   Defer bracing in the setting of chronic skin changes  SIRS: with fever and tachycardia, in the setting of gram positive bacteremia  Antibiotics as above  On steroid taper per endocrine  Trend CBC with differential and BMP to make sure developing no toxicities  Supportive care  Acute Encephalopathy  Monitor cognition  Antibiotics as above  Psoriasis  Dermatology following  Obtained punch biopsy which was significant for psoriasis, wound cultures NGTD  Possible concern for SSSS - unlikely due biopsy being more consistent with psoriasis, linezolid discontinued  Rheumatoid Arthritis  On home hydroxycloroquine and prednisone twice daily  Acute Kidney Injury: likely prerenal in the setting of decreased PO intake at the nursing home and the presence of atrial fibrillation with RVR  Trend BMP, dose adjusted antibiotics    Antibiotics:  Vancomycin 750mg daily    Subjective:  Patient has no fever, chills, sweats; no nausea, vomiting, diarrhea; no cough, shortness of breath; no pain. No new symptoms. Patient states that she feels about the same as yesterday, states that she is tired.    Objective:  Vitals:  Temp:  [97.5 °F (36.4 °C)-98.1 °F (36.7 °C)] 97.5 °F (36.4 °C)  HR:  [66-84] 84  Resp:  [16-20] 18  BP: (113-131)/(76-86) 117/76  SpO2:  [98 %-100 %] 98 %  Temp  (24hrs), Av.8 °F (36.6 °C), Min:97.5 °F (36.4 °C), Max:98.1 °F (36.7 °C)  Current: Temperature: 97.5 °F (36.4 °C)    Physical Exam:   General Appearance:  Alert, interactive, nontoxic, no acute distress.   Throat: Oropharynx moist without lesions.    Lungs:   Clear to auscultation bilaterally; no wheezes, rhonchi or rales; respirations unlabored   Heart:  RRR; no murmur, rub or gallop   Abdomen:   Soft, non-tender, non-distended, positive bowel sounds.     Extremities: No clubbing, cyanosis or edema   Skin: Desquamating rash, improved from admission, similar to yesterday       Labs, Imaging, & Other studies:   All pertinent labs and imaging studies were personally reviewed  Results from last 7 days   Lab Units 24  0540 24  0823 24  0541   WBC Thousand/uL 4.88 6.94 6.98   HEMOGLOBIN g/dL 10.7* 10.9* 11.4*   PLATELETS Thousands/uL 161 171 176     Results from last 7 days   Lab Units 24  0449 24  0540 06/10/24  0523 24  0632 24  0541 249 24  1107   SODIUM mmol/L 146 146 146   < > 147   < > 139   POTASSIUM mmol/L 3.8 3.4* 3.5   < > 3.8   < > 4.5   CHLORIDE mmol/L 111* 109* 112*   < > 110*   < > 101   CO2 mmol/L 27 27 27   < > 25   < > 23   BUN mg/dL 32* 37* 36*   < > 32*   < > 27*   CREATININE mg/dL 1.10 1.29 1.40*   < > 1.78*   < > 1.75*   EGFR ml/min/1.73sq m 49 40 36   < > 27   < > 28   CALCIUM mg/dL 9.0 9.0 9.0   < > 8.4   < > 9.8   AST U/L  --   --   --   --  21  --  34   ALT U/L  --   --   --   --  15  --  17   ALK PHOS U/L  --   --   --   --  32*  --  53    < > = values in this interval not displayed.     Results from last 7 days   Lab Units 24  1553 24  1946 24  0654 24  1720 24  1114 24  1107   BLOOD CULTURE  No Growth at 72 hrs.  --   --   --  Methicillin Resistant Staphylococcus aureus*  Staphylococcus epidermidis* No Growth After 5 Days.   GRAM STAIN RESULT   --  Rare Gram negative rods*  No polys seen*  No  Polys or Bacteria seen  Rare Gram positive cocci in pairs*  Rare Gram negative rods*  Rare Epithelial Cells*  No polys seen*  No Polys or Bacteria seen  No Polys or Bacteria seen  No Polys or Bacteria seen  --   --  Gram positive cocci in clusters*  --    URINE CULTURE   --   --   --  20,000-29,000 cfu/ml Escherichia coli*  <10,000 cfu/ml Escherichia coli*  <10,000 cfu/ml Pseudomonas aeruginosa MDR*  --   --    WOUND CULTURE   --  2+ Growth of Methicillin Resistant Staphylococcus aureus*  2+ Growth of  Few Colonies of Methicillin Resistant Staphylococcus aureus*  1+ Growth of  1+ Growth of Methicillin Resistant Staphylococcus aureus*  2+ Growth of  1+ Growth of Methicillin Resistant Staphylococcus aureus*  1+ Growth of  3+ Growth of Methicillin Resistant Staphylococcus aureus*  3+ Growth of  1+ Growth of  Few Colonies of  --   --   --   --    MRSA CULTURE ONLY   --   --  Methicillin Resistant Staphylococcus aureus isolated*  This patient requires contact isolation precautions per [place] law. Contact precautions are not required in [place] for nasal surveillance cultures.  --   --   --      Results from last 7 days   Lab Units 06/07/24  1032 06/06/24  1107   PROCALCITONIN ng/ml 0.37* 0.20     Results from last 7 days   Lab Units 06/11/24  0540   CRP mg/L 38.6*

## 2024-06-13 NOTE — ASSESSMENT & PLAN NOTE
Follows with rheumatology outpatient  Continue home hydroxychloroquine 200 mg twice daily   Prednisone 5 mg twice daily held as on stress dose steroids   Resume oral prednisone after stress dose steroids over

## 2024-06-13 NOTE — PROGRESS NOTES
[facility]  Progress Note  Name: Bhavna Al I  MRN: [id number]  Unit/Bed#: [facility] 820-01 I Date of Admission: 6/6/2024   Date of Service: 6/13/2024 I Hospital Day: 7    Assessment & Plan   * MRSA bacteremia  Assessment & Plan  Presented on 6/6/24 with generalized weakness, requiring increased assistance with ADL's, back pain and poor oral intake  Had a similar presentation in 1/2024 due to SSSS with septic shock and gram positive bacteremia requiring prolonged IV antibiotics  Was initially begun on Cefepime and Vancomycin  1 of 2 blood cultures positive for MRSA and coagulase-negative staphylococcus  Cefepime discontinued on 6/7/24 and Vancomycin continued  MRI thoracic spine (6/9/24) - Findings suspicious for discitis/osteomyelitis at T9-T10. No epidural collection.  Wound cultures - staph aureus  TTE - no vegetations  Linezolid added on 6/10/24   Repeat blood culture from 6/10/2024 negativ  Monitor temperature, WBC, follow-up repeat blood cultures  ID is following, with plan for 6 weeks of IV antibiotic through 7/22/2024  Plan for PICC line placement tomorrow    Acute osteomyelitis of thoracic spine (HCC)  Assessment & Plan  MRI with T9-T10 discitis/vertebral osteomyelitis without epidural collection  Per neurosurgery - defer bracing in the setting of extensive skin changes  Antibiotics as above    Acute metabolic encephalopathy  Assessment & Plan  Lethargy on presentation in the setting of above  Improved  Supportive care   Plan as above    Psoriasis  Assessment & Plan  Has multiple wounds  Recent diagnosis of SSSS  S/p punch biopsy by dermatology  Wound cultures - staph aureus  Continue with antibiotic treatment and local care      Acute kidney injury superimposed on CKD  (HCC)  Assessment & Plan  Lab Results   Component Value Date    EGFR 49 06/12/2024    EGFR 40 06/11/2024    EGFR 36 06/10/2024    CREATININE 1.10 06/12/2024    CREATININE 1.29 06/11/2024    CREATININE 1.40  (H) 06/10/2024     CKD 3 at baseline with baseline Cr 0.8-1.0  AMANDA on admission with creatinine 1.75 and worsened to 1.85  Likely prerenal azotemia due to reduced p.o. intake for several days prior to admission  Creatinine improving   Diuretics still on hold      SIRS (systemic inflammatory response syndrome) ([facility])  Assessment & Plan  With fever and tachycardia  Likely due to MRSA bacteremia  Plan as above    Pressure ulcer of left buttock, stage 3 ([facility])  Assessment & Plan  POA  Wound care    Secondary adrenal insufficiency ([facility])  Assessment & Plan  Possible secondary adrenal insufficiency in the setting of use of chronic prednisone  Hydrocortisone being tapered as above  As dose gets closer to her home dose of prednisone 5 mg twice daily confirm dose and type of steroid with rheumatology    Dysphagia  Assessment & Plan  Diet advanced to dental soft per speech recommendation    Hypotension  Assessment & Plan  In the setting of above and chronic Prednisone use at 5 mg BID  Improving  Continue to taper IV hydrocortisone    Chronic diastolic heart failure (HCC)  Assessment & Plan  Wt Readings from Last 3 Encounters:   06/12/24 108 kg (238 lb 1.6 oz)   06/08/24 103 kg (228 lb)   02/19/24 113 kg (249 lb)     Last EF 65% in 1/2024 although poor windows; prior TTE in 11/2022 with EF 55%  Home Lasix held as hypovolemic/AMANDA  Resume as able  Monitor volume status      Paroxysmal atrial fibrillation (HCC)  Assessment & Plan  Maintained at home with metoprolol tartrate 25mg BID  Continue Eliquis    Essential hypertension  Assessment & Plan  home Lopressor 25 mg twice daily  Continue current dose of Lopressor    GERD (gastroesophageal reflux disease)  Assessment & Plan  Continue home PPI    Rheumatoid arthritis ([facility])  Assessment & Plan  Follows with rheumatology outpatient  Continue home hydroxychloroquine 200 mg twice daily   Prednisone 5 mg twice daily held as on stress dose steroids   Resume oral prednisone after stress dose  steroids over               VTE Pharmacologic Prophylaxis: VTE Score: 7 High Risk (Score >/= 5) - Pharmacological DVT Prophylaxis Ordered: apixaban (Eliquis). Sequential Compression Devices Ordered.    Mobility:   Basic Mobility Inpatient Raw Score: 7  JH-HLM Goal: 2: Bed activities/Dependent transfer  JH-HLM Achieved: 2: Bed activities/Dependent transfer  JH-HLM Goal achieved. Continue to encourage appropriate mobility.    Patient Centered Rounds: I performed bedside rounds with nursing staff today.   Discussions with Specialists or Other Care Team Provider: CEDRIC HARRELL    Education and Discussions with Family / Patient: Updated  (granddaughter) via phone.    Total Time Spent on Date of Encounter in care of patient: 35 mins. This time was spent on one or more of the following: performing physical exam; counseling and coordination of care; obtaining or reviewing history; documenting in the medical record; reviewing/ordering tests, medications or procedures; communicating with other healthcare professionals and discussing with patient's family/caregivers.    Current Length of Stay: 7 day(s)  Current Patient Status: Inpatient   Certification Statement: The patient will continue to require additional inpatient hospital stay due to management of MRSA infection  Discharge Plan: Anticipate discharge tomorrow to rehab facility.    Code Status: Level 1 - Full Code    Subjective:   Patient seen and examined  Comfortable in bed  No event overnight  No complaints    Objective:     Vitals:   Temp (24hrs), Av.9 °F (36.6 °C), Min:97.5 °F (36.4 °C), Max:98.2 °F (36.8 °C)    Temp:  [97.5 °F (36.4 °C)-98.2 °F (36.8 °C)] 98.2 °F (36.8 °C)  HR:  [72-84] 79  Resp:  [15-20] 15  BP: (113-128)/(76-86) 128/84  SpO2:  [98 %-100 %] 98 %  Body mass index is 42.18 kg/m².     Input and Output Summary (last 24 hours):     Intake/Output Summary (Last 24 hours) at 2024 1146  Last data filed at 2024 0456  Gross per 24 hour    Intake 220 ml   Output 225 ml   Net -5 ml       Physical Exam:   Physical Exam   Patient is awake alert, eating breakfast  Obese comfortable in bed, in no acute distress  Skin with diffuse xerosis  Lung clear to auscultation bilateral  Heart positive S1 S2 no murmur  Abdomen soft nontender  Lower extremities no edema    Additional Data:     Labs:  Results from last 7 days   Lab Units 06/11/24  0540 06/09/24  0823 06/08/24  0541   WBC Thousand/uL 4.88   < > 6.98   HEMOGLOBIN g/dL 10.7*   < > 11.4*   HEMATOCRIT % 34.7*   < > 37.5   PLATELETS Thousands/uL 161   < > 176   SEGS PCT %  --   --  89*   LYMPHO PCT %  --   --  6*   MONO PCT %  --   --  4   EOS PCT %  --   --  0    < > = values in this interval not displayed.     Results from last 7 days   Lab Units 06/12/24  0449 06/09/24  0632 06/08/24  0541   SODIUM mmol/L 146   < > 147   POTASSIUM mmol/L 3.8   < > 3.8   CHLORIDE mmol/L 111*   < > 110*   CO2 mmol/L 27   < > 25   BUN mg/dL 32*   < > 32*   CREATININE mg/dL 1.10   < > 1.78*   ANION GAP mmol/L 8   < > 12   CALCIUM mg/dL 9.0   < > 8.4   ALBUMIN g/dL  --   --  2.4*   TOTAL BILIRUBIN mg/dL  --   --  0.42   ALK PHOS U/L  --   --  32*   ALT U/L  --   --  15   AST U/L  --   --  21   GLUCOSE RANDOM mg/dL 87   < > 89    < > = values in this interval not displayed.                 Results from last 7 days   Lab Units 06/07/24 2059 06/07/24  1032   LACTIC ACID mmol/L 1.4  --    PROCALCITONIN ng/ml  --  0.37*       Lines/Drains:  Invasive Devices       Central Venous Catheter Line  Duration             CVC Central Lines 06/07/24 Double Right Internal jugular 5 days              Drain  Duration             External Urinary Catheter -- days                    Central Line:  Goal for removal: Will discontinue when peripheral access obtained.              Imaging: No pertinent imaging reviewed.    Recent Cultures (last 7 days):   Results from last 7 days   Lab Units 06/09/24  1553 06/07/24  1946 06/06/24  1720   BLOOD  CULTURE  No Growth at 72 hrs.  --   --    GRAM STAIN RESULT   --  Rare Gram negative rods*  No polys seen*  No Polys or Bacteria seen  Rare Gram positive cocci in pairs*  Rare Gram negative rods*  Rare Epithelial Cells*  No polys seen*  No Polys or Bacteria seen  No Polys or Bacteria seen  No Polys or Bacteria seen  --    URINE CULTURE   --   --  20,000-29,000 cfu/ml Escherichia coli*  <10,000 cfu/ml Escherichia coli*  <10,000 cfu/ml Pseudomonas aeruginosa MDR*   WOUND CULTURE   --  2+ Growth of Methicillin Resistant Staphylococcus aureus*  2+ Growth of  Few Colonies of Methicillin Resistant Staphylococcus aureus*  1+ Growth of  1+ Growth of Methicillin Resistant Staphylococcus aureus*  2+ Growth of  1+ Growth of Methicillin Resistant Staphylococcus aureus*  1+ Growth of  3+ Growth of Methicillin Resistant Staphylococcus aureus*  3+ Growth of  1+ Growth of  Few Colonies of  --        Last 24 Hours Medication List:   Current Facility-Administered Medications   Medication Dose Route Frequency Provider Last Rate    acetaminophen  650 mg Rectal Q6H PRN Marzena Mendez MD      albuterol  2 puff Inhalation Q6H PRN [place] Pretty DO      apixaban  5 mg Oral BID Cathy Ribeiro MD      cholecalciferol  1,000 Units Oral Daily [place] Pretty DO      clobetasol   Topical BID [place] Pretty DO      hydrocortisone sodium succinate  25 mg Intravenous Q12H [facility] Umesh Kendrick DO      hydroxychloroquine  200 mg Oral BID [place] Pretty, DO      lidocaine  1 patch Topical Daily [place] Pretty DO      metoprolol tartrate  12.5 mg Oral Q12H [facility] Cathy Ribeiro MD      miconazole   Topical BID [place] Pretty, DO      mupirocin   Topical BID Petra Velarde MD      nystatin   Topical BID [place] Pretty, DO      pantoprazole  40 mg Intravenous Q24H [facility] Marzena Mendez MD      polyethylene glycol  17 g Oral Daily PRN [place] Pretty, DO      [START ON  6/14/2024] predniSONE  5 mg Oral BID With Meals Umesh Kendrick DO      senna-docusate sodium  1 tablet Oral Daily [place] Pretty DO      triamcinolone   Topical BID [place] Pretty DO      vancomycin  10 mg/kg (Adjusted) Intravenous Q24H Bhavna Cunningham  mg (06/13/24 0904)    zinc sulfate  220 mg Oral Daily [place] Pretty DO          Today, Patient Was Seen By: Umesh Kendrick DO    **Please Note: This note may have been constructed using a voice recognition system.**

## 2024-06-13 NOTE — ASSESSMENT & PLAN NOTE
Presented on 6/6/24 with generalized weakness, requiring increased assistance with ADL's, back pain and poor oral intake  Had a similar presentation in 1/2024 due to SSSS with septic shock and gram positive bacteremia requiring prolonged IV antibiotics  Was initially begun on Cefepime and Vancomycin  1 of 2 blood cultures positive for MRSA and coagulase-negative staphylococcus  Cefepime discontinued on 6/7/24 and Vancomycin continued  MRI thoracic spine (6/9/24) - Findings suspicious for discitis/osteomyelitis at T9-T10. No epidural collection.  Wound cultures - staph aureus  TTE - no vegetations  Linezolid added on 6/10/24   Repeat blood culture from 6/10/2024 negativ  Monitor temperature, WBC, follow-up repeat blood cultures  ID is following, with plan for 6 weeks of IV antibiotic through 7/22/2024  Plan for PICC line placement tomorrow

## 2024-06-13 NOTE — DISCHARGE INSTR - AVS FIRST PAGE
Intravenous daptomycin through 7/22/2024.  Check CPK, CBC with differential, BMP, sedimentation rate and CRP weekly while on the intravenous antibiotics    Remove PICC line after last dose of intravenous daptomycin

## 2024-06-13 NOTE — CASE MANAGEMENT
Case Management Progress Note    Patient name Bhavna Al  [facility] MRN [id number]  : 1950 Date 2024       LOS (days): 7  Geometric Mean LOS (GMLOS) (days): 6.4  Days to GMLOS:-0.6        OBJECTIVE:        Current admission status: Inpatient  Preferred Pharmacy:   [id number] - [place], [place] - [address]  [address]  [place] [address]  Phone: [phone] Fax: [phone]    [facility] - [place], [place] - [address] [place] [address]  Phone: [phone] Fax: [phone]    Primary Care Provider: Nya Taveras DO    Primary Insurance: MEDICARE  Secondary Insurance: [facility]    PROGRESS NOTE:    CM spoke w/ pt's granddaughter Beth Salamanca (Grandchild)   [phone] to discuss D/C plans. Beth inquired more information about IMM and info regarding appealing a D/C. Beth expressed concerns about pt being anticipated D/C tomorrow. SHARRI provided this information over email to beth as a copy of IMM was emailed to [email or web address]. CM team to continue to follow.

## 2024-06-13 NOTE — PROGRESS NOTES
Bhavna Al is a 73 y.o. female who is currently ordered Vancomycin IV with management by the Pharmacy Consult service.  Relevant clinical data and objective / subjective history reviewed.  Vancomycin Assessment:  Indication and Goal AUC/Trough: Soft tissue (goal -600, trough >10), -600, trough >10  Clinical Status: stable  Micro:     Renal Function:  SCr: 1.1 mg/dL  CrCl: 51.4 mL/min  Renal replacement: Not on dialysis  Days of Therapy: 8  Current Dose: 750mg IV q24h  Vancomycin Plan:  New Dosing: continue current dosing  Estimated AUC: 460 mcg*hr/mL  Estimated Trough: 14.6 mcg/mL  Next Level: Random 6/20 at 0600  Renal Function Monitoring: Daily BMP and UOP  Pharmacy will continue to follow closely for s/sx of nephrotoxicity, infusion reactions and appropriateness of therapy.  BMP and CBC will be ordered per protocol. We will continue to follow the patient’s culture results and clinical progress daily.    Fe Fitzgerald, Pharmacist

## 2024-06-13 NOTE — ASSESSMENT & PLAN NOTE
Has multiple wounds  Recent diagnosis of SSSS  S/p punch biopsy by dermatology  Wound cultures - staph aureus  Continue with antibiotic treatment and local care

## 2024-06-13 NOTE — PLAN OF CARE
Problem: PAIN - ADULT  Goal: Verbalizes/displays adequate comfort level or baseline comfort level  Description: Interventions:  - Encourage patient to monitor pain and request assistance  - Assess pain using appropriate pain scale  - Administer analgesics based on type and severity of pain and evaluate response  - Implement non-pharmacological measures as appropriate and evaluate response  - Consider cultural and social influences on pain and pain management  - Notify physician/advanced practitioner if interventions unsuccessful or patient reports new pain  Outcome: Progressing     Problem: INFECTION - ADULT  Goal: Absence or prevention of progression during hospitalization  Description: INTERVENTIONS:  - Assess and monitor for signs and symptoms of infection  - Monitor lab/diagnostic results  - Monitor all insertion sites, i.e. indwelling lines, tubes, and drains  - Monitor endotracheal if appropriate and nasal secretions for changes in amount and color  - [place] appropriate cooling/warming therapies per order  - Administer medications as ordered  - Instruct and encourage patient and family to use good hand hygiene technique  - Identify and instruct in appropriate isolation precautions for identified infection/condition  Outcome: Progressing  Goal: Absence of fever/infection during neutropenic period  Description: INTERVENTIONS:  - Monitor WBC    Outcome: Progressing     Problem: SAFETY ADULT  Goal: Patient will remain free of falls  Description: INTERVENTIONS:  - Educate patient/family on patient safety including physical limitations  - Instruct patient to call for assistance with activity   - Consult OT/PT to assist with strengthening/mobility   - Keep Call bell within reach  - Keep bed low and locked with side rails adjusted as appropriate  - Keep care items and personal belongings within reach  - Initiate and maintain comfort rounds  - Make Fall Risk Sign visible to staff  - Offer Toileting every 2 Hours,  in advance of need  - Initiate/Maintain bed alarm  - Apply yellow socks and bracelet for high fall risk patients  - Consider moving patient to room near nurses station  Outcome: Progressing  Goal: Maintain or return to baseline ADL function  Description: INTERVENTIONS:  -  Assess patient's ability to carry out ADLs; assess patient's baseline for ADL function and identify physical deficits which impact ability to perform ADLs (bathing, care of mouth/teeth, toileting, grooming, dressing, etc.)  - Assess/evaluate cause of self-care deficits   - Assess range of motion  - Assess patient's mobility; develop plan if impaired  - Assess patient's need for assistive devices and provide as appropriate  - Encourage maximum independence but intervene and supervise when necessary  - Involve family in performance of ADLs  - Assess for home care needs following discharge   - Consider OT consult to assist with ADL evaluation and planning for discharge  - Provide patient education as appropriate  Outcome: Progressing  Goal: Maintains/Returns to pre admission functional level  Description: INTERVENTIONS:  - Perform AM-PAC 6 Click Basic Mobility/ Daily Activity assessment daily.  - Set and communicate daily mobility goal to care team and patient/family/caregiver.   - Collaborate with rehabilitation services on mobility goals if consulted  - Perform Range of Motion 3 times a day.  - Reposition patient every 2 hours.  - Out of bed for toileting  - Record patient progress and toleration of activity level   Outcome: Progressing     Problem: DISCHARGE PLANNING  Goal: Discharge to home or other facility with appropriate resources  Description: INTERVENTIONS:  - Identify barriers to discharge w/patient and caregiver  - Arrange for needed discharge resources and transportation as appropriate  - Identify discharge learning needs (meds, wound care, etc.)  - Arrange for interpretive services to assist at discharge as needed  - Refer to Case  Management Department for coordinating discharge planning if the patient needs post-hospital services based on physician/advanced practitioner order or complex needs related to functional status, cognitive ability, or social support system  Outcome: Progressing     Problem: Knowledge Deficit  Goal: Patient/family/caregiver demonstrates understanding of disease process, treatment plan, medications, and discharge instructions  Description: Complete learning assessment and assess knowledge base.  Interventions:  - Provide teaching at level of understanding  - Provide teaching via preferred learning methods  Outcome: Progressing     Problem: Prexisting or High Potential for Compromised Skin Integrity  Goal: Skin integrity is maintained or improved  Description: INTERVENTIONS:  - Identify patients at risk for skin breakdown  - Assess and monitor skin integrity  - Assess and monitor nutrition and hydration status  - Monitor labs   - Assess for incontinence   - Turn and reposition patient  - Assist with mobility/ambulation  - Relieve pressure over bony prominences  - Avoid friction and shearing  - Provide appropriate hygiene as needed including keeping skin clean and dry  - Evaluate need for skin moisturizer/barrier cream  - Collaborate with interdisciplinary team   - Patient/family teaching  - Consider wound care consult   Outcome: Progressing     Problem: Nutrition/Hydration-ADULT  Goal: Nutrient/Hydration intake appropriate for improving, restoring or maintaining nutritional needs  Description: Monitor and assess patient's nutrition/hydration status for malnutrition. Collaborate with interdisciplinary team and initiate plan and interventions as ordered.  Monitor patient's weight and dietary intake as ordered or per policy. Utilize nutrition screening tool and intervene as necessary. Determine patient's food preferences and provide high-protein, high-caloric foods as appropriate.     INTERVENTIONS:  - Monitor oral  intake, urinary output, labs, and treatment plans  - Assess nutrition and hydration status and recommend course of action  - Evaluate amount of meals eaten  - Assist patient with eating if necessary   - Allow adequate time for meals  - Recommend/ encourage appropriate diets, oral nutritional supplements, and vitamin/mineral supplements  - Order, calculate, and assess calorie counts as needed  - Recommend, monitor, and adjust tube feedings and TPN/PPN based on assessed needs  - Assess need for intravenous fluids  - Provide specific nutrition/hydration education as appropriate  - Include patient/family/caregiver in decisions related to nutrition  Outcome: Progressing

## 2024-06-14 LAB — BACTERIA BLD CULT: NORMAL

## 2024-06-14 PROCEDURE — 99232 SBSQ HOSP IP/OBS MODERATE 35: CPT | Performed by: INTERNAL MEDICINE

## 2024-06-14 PROCEDURE — 99233 SBSQ HOSP IP/OBS HIGH 50: CPT | Performed by: INTERNAL MEDICINE

## 2024-06-14 RX ORDER — PANTOPRAZOLE SODIUM 40 MG/1
40 TABLET, DELAYED RELEASE ORAL
Status: DISCONTINUED | OUTPATIENT
Start: 2024-06-15 | End: 2024-06-18 | Stop reason: HOSPADM

## 2024-06-14 RX ADMIN — ZINC SULFATE 220 MG (50 MG) CAPSULE 220 MG: CAPSULE at 09:42

## 2024-06-14 RX ADMIN — MUPIROCIN: 20 OINTMENT TOPICAL at 09:41

## 2024-06-14 RX ADMIN — Medication 12.5 MG: at 23:09

## 2024-06-14 RX ADMIN — APIXABAN 5 MG: 5 TABLET, FILM COATED ORAL at 09:42

## 2024-06-14 RX ADMIN — HYDROXYCHLOROQUINE SULFATE 200 MG: 200 TABLET ORAL at 09:42

## 2024-06-14 RX ADMIN — Medication 1000 UNITS: at 09:42

## 2024-06-14 RX ADMIN — LIDOCAINE 5% 1 PATCH: 700 PATCH TOPICAL at 09:42

## 2024-06-14 RX ADMIN — SENNOSIDES AND DOCUSATE SODIUM 1 TABLET: 50; 8.6 TABLET ORAL at 09:42

## 2024-06-14 RX ADMIN — MUPIROCIN: 20 OINTMENT TOPICAL at 17:58

## 2024-06-14 RX ADMIN — VANCOMYCIN HYDROCHLORIDE 750 MG: 750 INJECTION, SOLUTION INTRAVENOUS at 09:41

## 2024-06-14 RX ADMIN — PREDNISONE 5 MG: 5 TABLET ORAL at 17:57

## 2024-06-14 RX ADMIN — HYDROXYCHLOROQUINE SULFATE 200 MG: 200 TABLET ORAL at 17:57

## 2024-06-14 RX ADMIN — MICONAZOLE NITRATE: 20 CREAM TOPICAL at 17:57

## 2024-06-14 RX ADMIN — Medication 12.5 MG: at 09:42

## 2024-06-14 RX ADMIN — APIXABAN 5 MG: 5 TABLET, FILM COATED ORAL at 17:57

## 2024-06-14 RX ADMIN — HYDROCORTISONE SODIUM SUCCINATE 25 MG: 100 INJECTION, POWDER, FOR SOLUTION INTRAMUSCULAR; INTRAVENOUS at 09:42

## 2024-06-14 NOTE — PROGRESS NOTES
Patient:  FARIDA SALCEDO    MRN:  [id number]    Yusufin Request ID:  [id number]    Level of care reserved:  Skilled Nursing Facility    Partner Reserved:  [facility], [place] [address] [phone]    Clinical needs requested:    Geography searched:  10 miles around [id number]    Start of Service:    Request sent:  11:31am EDT on 6/7/2024 by Antoinette Adorno    Partner reserved:  10:46am EDT on 6/14/2024 by Antoinette Adorno    Choice list shared:

## 2024-06-14 NOTE — CASE MANAGEMENT
Case Management Progress Note    Patient name Bhavna Al  Location [facility] 820/[facility] 820-01 MRN [id number]  : 1950 Date 2024       LOS (days): 8  Geometric Mean LOS (GMLOS) (days): 6.4  Days to GMLOS:-1.5        OBJECTIVE:        Current admission status: Inpatient  Preferred Pharmacy:   [id number] - [place], [place] - [address]  [address]  [place] [address]  Phone: [phone] Fax: [phone]    [facility] - [place], [place] - [address] [place] [address]  Phone: [phone] Fax: [phone]    Primary Care Provider: Nya Taveras DO    Primary Insurance: MEDICARE  Secondary Insurance: [facility]    PROGRESS NOTE:    CM attempted to call pt's daughter Rhoda Al (Child)   [phone] (H) but was unsuccessful. CM called pt's granddaughter Devorah Salamanca (Grandchild)   [phone] (M) to discuss D/C plans and left a VM w/ D/C plans and time line as well as phone number for call back. CM team to continue to follow.

## 2024-06-14 NOTE — WOUND OSTOMY CARE
Progress Note - Wound   Bhavna P Al 73 y.o. female MRN: [id number]  Unit/Bed#: [facility] 820-01 Encounter: [id number]        Assessment:   Patient seen today for wound care follow up assessment. Patient is dependent for all care, incontinent of bowel and bladder. Patient is on KREG bed, green foam wedges for repositioning.    B/L heels are dry intact and karly with no skin loss or wounds present, preventative orders in place        MASD Mid Sacrum: scattered areas of partial thickness skin loss with pink and beefy red in color. Maia-wounds are fragile. No drainage noted.     2.  Left Posterior Upper Thigh Fold MASD/ITD: linear in shape area of partial thickness skin loss with pink tissue, periwound skin is fragile and scaly.        3. Left Posterior Distal Thigh/Knee Fold MASD/ITD: linear in shape area of partial thickness skin loss with pink tissue.      No induration, fluctuance, odor, warmth/temperature differences, redness, or purulence noted to the above noted wounds and skin areas assessed. New dressings applied per orders listed below. Patient tolerated well- no s/s of non-verbal pain or discomfort observed during the encounter. Bedside nurse aware of plan of care. See flow sheets for more detailed assessment findings.  Wound care will continue to follow.       Skin Care Plan:  1-B/L Buttocks, Left Ischium, Mid Sacrum, and Left Lateral Hip Wounds: Cleanse with NSS and pat dry. Apply calazime to open wound beds only and apply hydraguard to intact maia-wounds/ all other intact aspects of sacro-buttocks. Apply Both TID or PRN episodes of incontinence  2-Turn/reposition q2h or when medically stable for pressure re-distribution on skin. .   3-Elevate heels to offload pressure. Apply Offloading boots to B/L feet   4-Moisturize skin daily with skin nourishing cream  5-Ehob cushion in chair when out of bed.  6-Preventative Hydraguard to bilateral heels and ankles BID and PRN.   7-B/L Back, Abdomen/Pannus, Left Posterior  Thigh & Knee Folds: cleanse with soap and water. Pat dry. Apply calcium alginate rope to areas. Change daily or PRN soilage/displacement.   8-KREG bed       Wound 06/06/24 MASD Sacrum Mid (Active)   Wound Image   06/14/24 0928   Wound Description Beefy red;Pink 06/14/24 0928   Maia-wound Assessment Fragile 06/14/24 0900   Wound Length (cm) 3 cm 06/14/24 0928   Wound Width (cm) 3 cm 06/14/24 0928   Wound Depth (cm) 0.1 cm 06/14/24 0928   Wound Surface Area (cm^2) 9 cm^2 06/14/24 0928   Wound Volume (cm^3) 0.9 cm^3 06/14/24 0928   Calculated Wound Volume (cm^3) 0.9 cm^3 06/14/24 0928   Change in Wound Size % 52.63 06/14/24 0928   Wound Site Closure TANYA 06/14/24 0928   Drainage Amount None 06/14/24 0928   Drainage Description Sanguineous 06/14/24 0900   Non-staged Wound Description Partial thickness 06/14/24 0928   Treatments Cleansed 06/14/24 0928   Dressing Moisture barrier 06/14/24 0928   Wound packed? No 06/14/24 0928   Packing- # removed 0 06/14/24 0928   Packing- # inserted 0 06/14/24 0928   Dressing Changed New 06/14/24 0928   Patient Tolerance Tolerated well 06/14/24 0928   Dressing Status Clean;Dry;Intact 06/14/24 0928       Wound 06/06/24 MASD Thigh Left;Posterior;Upper (Active)   Wound Image   06/14/24 0925   Wound Description Beefy red;Fragile 06/14/24 0925   Maia-wound Assessment Fragile 06/14/24 0925   Wound Length (cm) 0.5 cm 06/14/24 0925   Wound Width (cm) 2 cm 06/14/24 0925   Wound Depth (cm) 0.1 cm 06/14/24 0925   Wound Surface Area (cm^2) 1 cm^2 06/14/24 0925   Wound Volume (cm^3) 0.1 cm^3 06/14/24 0925   Calculated Wound Volume (cm^3) 0.1 cm^3 06/14/24 0925   Change in Wound Size % 60 06/14/24 0925   Wound Site Closure TANYA 06/14/24 0925   Drainage Amount Scant 06/14/24 0925   Drainage Description Serosanguineous 06/14/24 0925   Non-staged Wound Description Partial thickness 06/14/24 0925   Treatments Cleansed 06/14/24 0925   Dressing Calcium Alginate 06/14/24 0925   Wound packed? No 06/14/24 0925    Packing- # removed 0 06/14/24 0925   Packing- # inserted 0 06/14/24 0925   Dressing Changed New 06/14/24 0925   Patient Tolerance Tolerated well 06/14/24 0925   Dressing Status Clean;Dry;Intact 06/14/24 0925       Wound 06/06/24 MASD Thigh Distal;Left;Posterior (Active)   Wound Image   06/14/24 0926   Wound Description Beefy red;Fragile 06/14/24 0926   Maia-wound Assessment Fragile 06/14/24 0926   Wound Length (cm) 0.5 cm 06/14/24 0926   Wound Width (cm) 2 cm 06/14/24 0926   Wound Depth (cm) 0.1 cm 06/14/24 0926   Wound Surface Area (cm^2) 1 cm^2 06/14/24 0926   Wound Volume (cm^3) 0.1 cm^3 06/14/24 0926   Calculated Wound Volume (cm^3) 0.1 cm^3 06/14/24 0926   Change in Wound Size % 44.44 06/14/24 0926   Wound Site Closure TANYA 06/14/24 0926   Drainage Amount None 06/14/24 0926   Drainage Description TANYA 06/14/24 0900   Non-staged Wound Description Partial thickness 06/14/24 0926   Treatments Cleansed 06/14/24 0926   Dressing Calcium Alginate 06/14/24 0926   Wound packed? No 06/14/24 0926   Packing- # removed 0 06/14/24 0926   Packing- # inserted 0 06/14/24 0926   Dressing Changed New 06/14/24 0926   Patient Tolerance Tolerated well 06/14/24 0926   Dressing Status Clean;Dry;Intact 06/14/24 0926           Delisa Jacinto BSN, RN, CWOCN

## 2024-06-14 NOTE — ASSESSMENT & PLAN NOTE
Possible secondary adrenal insufficiency in the setting of use of chronic prednisone  Hydrocortisone being tapered as above  Resume an oral dose prednisone

## 2024-06-14 NOTE — PROGRESS NOTES
[facility], [place]  Progress Note  Name: Bhavna Al I  MRN: [id number]  Unit/Bed#: [facility] 820-01 I Date of Admission: 6/6/2024   Date of Service: 6/14/2024 I Hospital Day: 8    Assessment & Plan   * MRSA bacteremia  Assessment & Plan  Presented on 6/6/24 with generalized weakness, requiring increased assistance with ADL's, back pain and poor oral intake  Had a similar presentation in 1/2024 due to SSSS with septic shock and gram positive bacteremia requiring prolonged IV antibiotics  Was initially begun on Cefepime and Vancomycin  1 of 2 blood cultures positive for MRSA and coagulase-negative staphylococcus  Cefepime discontinued on 6/7/24 and Vancomycin continued  MRI thoracic spine (6/9/24) - Findings suspicious for discitis/osteomyelitis at T9-T10. No epidural collection.  Wound cultures - staph aureus  TTE - no vegetations  Linezolid added on 6/10/24   Repeat blood culture from 6/10/2024 negativ  Monitor temperature, WBC, follow-up repeat blood cultures  ID is following, with plan for 6 weeks of IV antibiotic through 7/22/2024  PICC line placement today    Acute osteomyelitis of thoracic spine (HCC)  Assessment & Plan  MRI with T9-T10 discitis/vertebral osteomyelitis without epidural collection  Per neurosurgery - defer bracing in the setting of extensive skin changes  Antibiotics as above    Acute metabolic encephalopathy  Assessment & Plan  Lethargy on presentation in the setting of above  Improved  Supportive care   Plan as above    Psoriasis  Assessment & Plan  Has multiple wounds  Recent diagnosis of SSSS  S/p punch biopsy by dermatology  Wound cultures - staph aureus  Continue with antibiotic treatment and local care      Acute kidney injury superimposed on CKD  (HCC)  Assessment & Plan  Lab Results   Component Value Date    EGFR 49 06/12/2024    EGFR 40 06/11/2024    EGFR 36 06/10/2024    CREATININE 1.10 06/12/2024    CREATININE 1.29 06/11/2024    CREATININE 1.40 (H)  06/10/2024     CKD 3 at baseline with baseline Cr 0.8-1.0  AMANDA on admission with creatinine 1.75 and worsened to 1.85  Likely prerenal azotemia due to reduced p.o. intake for several days prior to admission  Creatinine improving   Diuretics still on hold      SIRS (systemic inflammatory response syndrome) ([facility])  Assessment & Plan  With fever and tachycardia  Likely due to MRSA bacteremia  Plan as above    Pressure ulcer of left buttock, stage 3 ([facility])  Assessment & Plan  POA  Wound care    Secondary adrenal insufficiency (HCC)  Assessment & Plan  Possible secondary adrenal insufficiency in the setting of use of chronic prednisone  Hydrocortisone being tapered as above  Resume an oral dose prednisone    Dysphagia  Assessment & Plan  Diet advanced to dental soft per speech recommendation    Hypotension  Assessment & Plan  Blood pressure is improving  Monitor    Chronic diastolic heart failure (HCC)  Assessment & Plan  Wt Readings from Last 3 Encounters:   06/14/24 106 kg (232 lb 12.9 oz)   06/08/24 103 kg (228 lb)   02/19/24 113 kg (249 lb)     Last EF 65% in 1/2024 although poor windows; prior TTE in 11/2022 with EF 55%  Home Lasix held as hypovolemic/AMANDA  Resume as able  Monitor volume status      Paroxysmal atrial fibrillation (HCC)  Assessment & Plan  Maintained at home with metoprolol tartrate 25mg BID  Continue Eliquis    Essential hypertension  Assessment & Plan  home Lopressor 25 mg twice daily  Continue current dose of Lopressor    GERD (gastroesophageal reflux disease)  Assessment & Plan  Continue home PPI    Rheumatoid arthritis (HCC)  Assessment & Plan  Follows with rheumatology outpatient  Continue home hydroxychloroquine 200 mg twice daily   Prednisone 5 mg twice daily held as on stress dose steroids   Resume oral prednisone                VTE Pharmacologic Prophylaxis: VTE Score: 7 High Risk (Score >/= 5) - Pharmacological DVT Prophylaxis Ordered: apixaban (Eliquis). Sequential Compression Devices  Ordered.    Mobility:   Basic Mobility Inpatient Raw Score: 7  JH-HLM Goal: 2: Bed activities/Dependent transfer  JH-HLM Achieved: 2: Bed activities/Dependent transfer  JH-HLM Goal achieved. Continue to encourage appropriate mobility.    Patient Centered Rounds: I performed bedside rounds with nursing staff today.   Discussions with Specialists or Other Care Team Provider: CEDRIC HARRELL    Education and Discussions with Family / Patient: Attempted to update  (daughter) via phone. Unable to contact.    Total Time Spent on Date of Encounter in care of patient: 35 mins. This time was spent on one or more of the following: performing physical exam; counseling and coordination of care; obtaining or reviewing history; documenting in the medical record; reviewing/ordering tests, medications or procedures; communicating with other healthcare professionals and discussing with patient's family/caregivers.    Current Length of Stay: 8 day(s)  Current Patient Status: Inpatient   Certification Statement: The patient will continue to require additional inpatient hospital stay due to PICC line placement  Discharge Plan: Anticipate discharge tomorrow to rehab facility.   is following    Code Status: Level 1 - Full Code    Subjective:   Patient seen and examined  Comfortable in bed  No event overnight  No complaints    Objective:     Vitals:   Temp (24hrs), Av °F (37.2 °C), Min:98.7 °F (37.1 °C), Max:99.1 °F (37.3 °C)    Temp:  [98.7 °F (37.1 °C)-99.1 °F (37.3 °C)] 99.1 °F (37.3 °C)  HR:  [66-97] 66  Resp:  [14-17] 17  BP: (121-128)/(54-73) 121/54  SpO2:  [98 %-100 %] 100 %  Body mass index is 41.24 kg/m².     Input and Output Summary (last 24 hours):     Intake/Output Summary (Last 24 hours) at 2024 1357  Last data filed at 2024 1255  Gross per 24 hour   Intake 180 ml   Output 250 ml   Net -70 ml       Physical Exam:   Physical Exam   Patient is awake alert, in no acute distress  Obese comfortable  in bed  Skin with diffuse xerosis  Lung clear to auscultation bilateral  Heart positive S1 S2 no murmur  Abdomen soft nontender  Lower extremities no edema    Additional Data:     Labs:  Results from last 7 days   Lab Units 06/11/24  0540 06/09/24  0823 06/08/24  0541   WBC Thousand/uL 4.88   < > 6.98   HEMOGLOBIN g/dL 10.7*   < > 11.4*   HEMATOCRIT % 34.7*   < > 37.5   PLATELETS Thousands/uL 161   < > 176   SEGS PCT %  --   --  89*   LYMPHO PCT %  --   --  6*   MONO PCT %  --   --  4   EOS PCT %  --   --  0    < > = values in this interval not displayed.     Results from last 7 days   Lab Units 06/12/24  0449 06/09/24  0632 06/08/24  0541   SODIUM mmol/L 146   < > 147   POTASSIUM mmol/L 3.8   < > 3.8   CHLORIDE mmol/L 111*   < > 110*   CO2 mmol/L 27   < > 25   BUN mg/dL 32*   < > 32*   CREATININE mg/dL 1.10   < > 1.78*   ANION GAP mmol/L 8   < > 12   CALCIUM mg/dL 9.0   < > 8.4   ALBUMIN g/dL  --   --  2.4*   TOTAL BILIRUBIN mg/dL  --   --  0.42   ALK PHOS U/L  --   --  32*   ALT U/L  --   --  15   AST U/L  --   --  21   GLUCOSE RANDOM mg/dL 87   < > 89    < > = values in this interval not displayed.                 Results from last 7 days   Lab Units 06/07/24  2059   LACTIC ACID mmol/L 1.4       Lines/Drains:  Invasive Devices       Central Venous Catheter Line  Duration             CVC Central Lines 06/07/24 Double Right Internal jugular 6 days              Drain  Duration             External Urinary Catheter -- days                    Central Line:  Goal for removal: Will discontinue when peripheral access obtained.              Imaging: No pertinent imaging reviewed.    Recent Cultures (last 7 days):   Results from last 7 days   Lab Units 06/09/24  1553 06/07/24  1946   BLOOD CULTURE  No Growth After 4 Days.  --    GRAM STAIN RESULT   --  Rare Gram negative rods*  No polys seen*  No Polys or Bacteria seen  Rare Gram positive cocci in pairs*  Rare Gram negative rods*  Rare Epithelial Cells*  No polys  seen*  No Polys or Bacteria seen  No Polys or Bacteria seen  No Polys or Bacteria seen   WOUND CULTURE   --  2+ Growth of Methicillin Resistant Staphylococcus aureus*  2+ Growth of  Few Colonies of Methicillin Resistant Staphylococcus aureus*  1+ Growth of  1+ Growth of Methicillin Resistant Staphylococcus aureus*  2+ Growth of  1+ Growth of Methicillin Resistant Staphylococcus aureus*  1+ Growth of  3+ Growth of Methicillin Resistant Staphylococcus aureus*  3+ Growth of  1+ Growth of  Few Colonies of       Last 24 Hours Medication List:   Current Facility-Administered Medications   Medication Dose Route Frequency Provider Last Rate    acetaminophen  650 mg Rectal Q6H PRN Marzena Mendez MD      albuterol  2 puff Inhalation Q6H PRN Jersey Pretty DO      apixaban  5 mg Oral BID Cathy Ribeiro MD      cholecalciferol  1,000 Units Oral Daily Jersey Pretty, DO      clobetasol   Topical BID Jersey Pretty DO      hydroxychloroquine  200 mg Oral BID Jersey Pretty DO      lidocaine  1 patch Topical Daily Jersey Pretty DO      metoprolol tartrate  12.5 mg Oral Q12H FAMILIA Cathy Ribeiro MD      miconazole   Topical BID Jersey Pretty,       mupirocin   Topical BID Petra Velarde MD      nystatin   Topical BID Jersey Pretty DO      [START ON 6/15/2024] pantoprazole  40 mg Oral Early Morning Umesh Kendrick DO      polyethylene glycol  17 g Oral Daily PRN Jersey Pretty DO      predniSONE  5 mg Oral BID With Meals Umesh Kendrick DO      senna-docusate sodium  1 tablet Oral Daily Jersey Pretty DO      triamcinolone   Topical BID Jersey Pretty DO      vancomycin  10 mg/kg (Adjusted) Intravenous Q24H Bhavna Cunningham  mg (06/14/24 0941)    zinc sulfate  220 mg Oral Daily Jersey Pretty DO          Today, Patient Was Seen By: Umesh Kendrick DO    **Please Note: This note may have been constructed using a voice recognition  system.**

## 2024-06-14 NOTE — ASSESSMENT & PLAN NOTE
Wt Readings from Last 3 Encounters:   06/14/24 106 kg (232 lb 12.9 oz)   06/08/24 103 kg (228 lb)   02/19/24 113 kg (249 lb)     Last EF 65% in 1/2024 although poor windows; prior TTE in 11/2022 with EF 55%  Home Lasix held as hypovolemic/AMANDA  Resume as able  Monitor volume status

## 2024-06-14 NOTE — PROGRESS NOTES
Bhavna Al is a 73 y.o. female who is currently ordered Vancomycin IV with management by the Pharmacy Consult service.  Relevant clinical data and objective / subjective history reviewed.  Vancomycin Assessment:  Indication and Goal AUC/Trough: Soft tissue (goal -600, trough >10), -600, trough >10  Clinical Status: stable  Micro:     Renal Function: labs never drawn today, ordered for tomorrow  SCr: 1.1 mg/dL  CrCl: 50.8 mL/min  Renal replacement: Not on dialysis  Days of Therapy: 9  Current Dose: 750mg IV q24h  Vancomycin Plan:  New Dosing: continue current dosing  Estimated AUC: 463 mcg*hr/mL  Estimated Trough: 14.6 mcg/mL  Next Level: Random 6/20 at 0600  Renal Function Monitoring: Daily BMP and UOP  Pharmacy will continue to follow closely for s/sx of nephrotoxicity, infusion reactions and appropriateness of therapy.  BMP and CBC will be ordered per protocol. We will continue to follow the patient’s culture results and clinical progress daily.    Fe Fitzgerald, Pharmacist

## 2024-06-14 NOTE — TELEPHONE ENCOUNTER
6/14/24 - PT STILL IN HOSPITAL    06/13/2024- PT STILL IN HOSPITAL    06/11/2024- PT IN hospitals HOSPITAL  07/09/2024- 4 WK HFU W/ AP W/ thoracic spine x-ray     [facility]  Patient needs 4-week hospital follow-up with AP with thoracic spine x-ray

## 2024-06-14 NOTE — PROGRESS NOTES
Progress Note - Infectious Disease   Bhavna Al 73 y.o. female MRN: [id number]  Unit/Bed#: [facility] 820-01 Encounter: [id number]      Impression/Plan:  MRSA bacteremia: 1/2 blood cultures from admission grew MRSA and coagulase negative Staph   Continue vancomycin, monitor levels, continue through  to complete 6 weeks of treatment  TTE negative, technically limited study   Follow up repeat blood cultures, no growth at 4 days  Plan for 6 weeks of IV antibiotics  Plan for PICC line if no growth by tomorrow  T9/T10 discitis/vertebral osteomyelitis. Without epidural collection seen  Neurosurgery consulted - conservative management   Defer bracing in the setting of chronic skin changes  SIRS: with fever and tachycardia, in the setting of gram positive bacteremia  Antibiotics as above  On steroid taper per endocrine  Trend CBC with differential and BMP to make sure not developing toxicities  Supportive care  Acute encephalopathy  Monitor cognition  Antibiotics as above  Psoriasis  Dermatology following  Obtained punch biopsy which was significant for psoriasis, wound cultures NGTD  Possible concern for SSSS- unlikely due to biopsy being more consistent with psoriasis  Rheumatoid arthritis  On home hydroxychloroquine and prednisone daily  Acute Kidney Injury: likely prerenal in the setting of decreased PO intake at the nursing home and the presence of atrial fibrillation with RVR  Trend BMP, dose adjusted antibiotics  Last measured creatinine within normal limits    Antibiotics:  Vancomycin 750mg IV daily    Subjective:  Patient has no fever, chills, sweats; no nausea, vomiting, diarrhea; no cough, shortness of breath; no pain. She states that she feels fine and denies any new symptoms.    Objective:  Vitals:  Temp:  [98.2 °F (36.8 °C)-99.1 °F (37.3 °C)] 98.7 °F (37.1 °C)  HR:  [79-97] 91  Resp:  [14-16] 16  BP: (120-128)/(72-86) 123/73  SpO2:  [98 %-99 %] 98 %  Temp (24hrs), Av.7 °F (37.1 °C), Min:98.2 °F (36.8 °C),  Max:99.1 °F (37.3 °C)  Current: Temperature: 98.7 °F (37.1 °C)    Physical Exam:   General Appearance:  Alert, interactive, nontoxic, no acute distress.   Throat: Oropharynx moist without lesions.    Lungs:   Clear to auscultation bilaterally; no wheezes, rhonchi or rales; respirations unlabored   Heart:  RRR; no murmur, rub or gallop   Abdomen:   Soft, non-tender, non-distended, positive bowel sounds.     Extremities: No clubbing, cyanosis or edema   Skin: No new rashes or lesions. No draining wounds noted.       Labs, Imaging, & Other studies:   All pertinent labs and imaging studies were personally reviewed  Results from last 7 days   Lab Units 06/11/24  0540 06/09/24  0823 06/08/24  0541   WBC Thousand/uL 4.88 6.94 6.98   HEMOGLOBIN g/dL 10.7* 10.9* 11.4*   PLATELETS Thousands/uL 161 171 176     Results from last 7 days   Lab Units 06/12/24  0449 06/11/24  0540 06/10/24  0523 06/09/24  0632 06/08/24  0541   SODIUM mmol/L 146 146 146   < > 147   POTASSIUM mmol/L 3.8 3.4* 3.5   < > 3.8   CHLORIDE mmol/L 111* 109* 112*   < > 110*   CO2 mmol/L 27 27 27   < > 25   BUN mg/dL 32* 37* 36*   < > 32*   CREATININE mg/dL 1.10 1.29 1.40*   < > 1.78*   EGFR ml/min/1.73sq m 49 40 36   < > 27   CALCIUM mg/dL 9.0 9.0 9.0   < > 8.4   AST U/L  --   --   --   --  21   ALT U/L  --   --   --   --  15   ALK PHOS U/L  --   --   --   --  32*    < > = values in this interval not displayed.     Results from last 7 days   Lab Units 06/09/24  1553 06/07/24  1946   BLOOD CULTURE  No Growth After 4 Days.  --    GRAM STAIN RESULT   --  Rare Gram negative rods*  No polys seen*  No Polys or Bacteria seen  Rare Gram positive cocci in pairs*  Rare Gram negative rods*  Rare Epithelial Cells*  No polys seen*  No Polys or Bacteria seen  No Polys or Bacteria seen  No Polys or Bacteria seen   WOUND CULTURE   --  2+ Growth of Methicillin Resistant Staphylococcus aureus*  2+ Growth of  Few Colonies of Methicillin Resistant Staphylococcus  aureus*  1+ Growth of  1+ Growth of Methicillin Resistant Staphylococcus aureus*  2+ Growth of  1+ Growth of Methicillin Resistant Staphylococcus aureus*  1+ Growth of  3+ Growth of Methicillin Resistant Staphylococcus aureus*  3+ Growth of  1+ Growth of  Few Colonies of     Results from last 7 days   Lab Units 06/07/24  1032   PROCALCITONIN ng/ml 0.37*     Results from last 7 days   Lab Units 06/11/24  0540   CRP mg/L 38.6*

## 2024-06-14 NOTE — CASE MANAGEMENT
Case Management Discharge Planning Note    Patient name Bhavna Al  Location [facility] 820/[facility] 820-01 MRN [id number]  : 1950 Date 2024       Current Admission Date: 2024  Current Admission Diagnosis:MRSA bacteremia   Patient Active Problem List    Diagnosis Date Noted Date Diagnosed    Secondary adrenal insufficiency (HCC) 2024     Pressure ulcer of left buttock, stage 3 ([facility]) 2024     Acute osteomyelitis of thoracic spine ([facility]) 2024     MRSA bacteremia 2024     Hypotension 2024     Dysphagia 2024     Deep tissue injury 2024     Localized swelling on left hand 2023     Stage 3 chronic kidney disease (HCC) 2023     Febrile illness 2023     Dermatitis associated with incontinence 2023     Right shoulder pain 12/15/2022     Abnormal urinalysis 2022     Ambulatory dysfunction 2022     Hyperuricemia 2022     Acute metabolic encephalopathy 2022     Chronic diastolic heart failure ([facility]) 2022     Acute bronchitis 2022     Assistance needed with transportation 09/15/2022     Abnormal CT of the chest 2022     Gram-positive cocci bacteremia 2022     Psoriasis 2022     Elevated troponin 2022     COVID-19 2022     Acute kidney injury superimposed on CKD  ([facility]) 01/10/2022     Paroxysmal atrial fibrillation ([facility]) 01/10/2022     Hyperparathyroidism ([facility]) 2021     Murmur, cardiac 2021     BPPV (benign paroxysmal positional vertigo) 2018     Seasonal allergic rhinitis due to pollen 2018     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     Osteoporosis 2016     SIRS (systemic inflammatory response syndrome) ([facility]) 2016     Rheumatoid arthritis ([facility]) 2016     GERD (gastroesophageal reflux disease) 2016     Sarcoid 2016     Morbid obesity ([facility]) 2016     Vitamin D deficiency 2015     Essential hypertension 2014      Lumbar radiculopathy 12/04/2014       LOS (days): 8  Geometric Mean LOS (GMLOS) (days): 6.4  Days to GMLOS:-1.6     OBJECTIVE:  Risk of Unplanned Readmission Score: 22.09         Current admission status: Inpatient   Preferred Pharmacy:   [id number] - [place], [place] - [address]  [address]  [place] [address]  Phone: [phone] Fax: [phone]    [facility] - [place], [place] - [facility] [facility]  [address] [address]  Phone: [phone] Fax: [phone]    Primary Care Provider: Nya Taveras DO    Primary Insurance: MEDICARE  Secondary Insurance: [facility]    DISCHARGE DETAILS:           CM called pt's granddaughter    Devorah Salamanca (Grandchild)   [phone] ([phone])  to inform her of consent needed for PICC line to be placed. Devorah said that would have to be through her Mother Rhoda. CM explained how attempts of contact have been made and unsuccessful to Rhoda. Devorah said she would try and contact her to let her know. CM provided Devorah with phone number that needed to be contact in order for PICC to be placed. CM team to continue to follow and inform family.

## 2024-06-14 NOTE — ASSESSMENT & PLAN NOTE
Follows with rheumatology outpatient  Continue home hydroxychloroquine 200 mg twice daily   Prednisone 5 mg twice daily held as on stress dose steroids   Resume oral prednisone

## 2024-06-15 LAB
ANION GAP SERPL CALCULATED.3IONS-SCNC: 5 MMOL/L (ref 4–13)
BASOPHILS # BLD AUTO: 0.01 THOUSANDS/ÂΜL (ref 0–0.1)
BASOPHILS NFR BLD AUTO: 0 % (ref 0–1)
BUN SERPL-MCNC: 27 MG/DL (ref 5–25)
CALCIUM SERPL-MCNC: 8.9 MG/DL (ref 8.4–10.2)
CHLORIDE SERPL-SCNC: 116 MMOL/L (ref 96–108)
CO2 SERPL-SCNC: 28 MMOL/L (ref 21–32)
CREAT SERPL-MCNC: 1.16 MG/DL (ref 0.6–1.3)
EOSINOPHIL # BLD AUTO: 0.14 THOUSAND/ÂΜL (ref 0–0.61)
EOSINOPHIL NFR BLD AUTO: 3 % (ref 0–6)
ERYTHROCYTE [DISTWIDTH] IN BLOOD BY AUTOMATED COUNT: 15.3 % (ref 11.6–15.1)
GFR SERPL CREATININE-BSD FRML MDRD: 46 ML/MIN/1.73SQ M
GLUCOSE SERPL-MCNC: 74 MG/DL (ref 65–140)
HCT VFR BLD AUTO: 35.5 % (ref 34.8–46.1)
HGB BLD-MCNC: 10.8 G/DL (ref 11.5–15.4)
IMM GRANULOCYTES # BLD AUTO: 0.04 THOUSAND/UL (ref 0–0.2)
IMM GRANULOCYTES NFR BLD AUTO: 1 % (ref 0–2)
LYMPHOCYTES # BLD AUTO: 0.86 THOUSANDS/ÂΜL (ref 0.6–4.47)
LYMPHOCYTES NFR BLD AUTO: 16 % (ref 14–44)
MAGNESIUM SERPL-MCNC: 2.1 MG/DL (ref 1.9–2.7)
MCH RBC QN AUTO: 27.8 PG (ref 26.8–34.3)
MCHC RBC AUTO-ENTMCNC: 30.4 G/DL (ref 31.4–37.4)
MCV RBC AUTO: 91 FL (ref 82–98)
MONOCYTES # BLD AUTO: 0.43 THOUSAND/ÂΜL (ref 0.17–1.22)
MONOCYTES NFR BLD AUTO: 8 % (ref 4–12)
NEUTROPHILS # BLD AUTO: 3.88 THOUSANDS/ÂΜL (ref 1.85–7.62)
NEUTS SEG NFR BLD AUTO: 72 % (ref 43–75)
NRBC BLD AUTO-RTO: 0 /100 WBCS
PLATELET # BLD AUTO: 163 THOUSANDS/UL (ref 149–390)
PMV BLD AUTO: 11.8 FL (ref 8.9–12.7)
POTASSIUM SERPL-SCNC: 3.2 MMOL/L (ref 3.5–5.3)
RBC # BLD AUTO: 3.89 MILLION/UL (ref 3.81–5.12)
SODIUM SERPL-SCNC: 149 MMOL/L (ref 135–147)
WBC # BLD AUTO: 5.36 THOUSAND/UL (ref 4.31–10.16)

## 2024-06-15 PROCEDURE — 99233 SBSQ HOSP IP/OBS HIGH 50: CPT | Performed by: INTERNAL MEDICINE

## 2024-06-15 PROCEDURE — 80048 BASIC METABOLIC PNL TOTAL CA: CPT | Performed by: INTERNAL MEDICINE

## 2024-06-15 PROCEDURE — 85025 COMPLETE CBC W/AUTO DIFF WBC: CPT | Performed by: INTERNAL MEDICINE

## 2024-06-15 PROCEDURE — 83735 ASSAY OF MAGNESIUM: CPT | Performed by: INTERNAL MEDICINE

## 2024-06-15 PROCEDURE — 99232 SBSQ HOSP IP/OBS MODERATE 35: CPT | Performed by: INTERNAL MEDICINE

## 2024-06-15 RX ORDER — POTASSIUM CHLORIDE 20 MEQ/1
40 TABLET, EXTENDED RELEASE ORAL ONCE
Status: COMPLETED | OUTPATIENT
Start: 2024-06-15 | End: 2024-06-15

## 2024-06-15 RX ADMIN — Medication 1000 UNITS: at 10:20

## 2024-06-15 RX ADMIN — SENNOSIDES AND DOCUSATE SODIUM 1 TABLET: 50; 8.6 TABLET ORAL at 10:20

## 2024-06-15 RX ADMIN — MICONAZOLE NITRATE: 20 CREAM TOPICAL at 17:05

## 2024-06-15 RX ADMIN — PREDNISONE 5 MG: 5 TABLET ORAL at 10:20

## 2024-06-15 RX ADMIN — POTASSIUM CHLORIDE 40 MEQ: 1500 TABLET, EXTENDED RELEASE ORAL at 10:20

## 2024-06-15 RX ADMIN — TRIAMCINOLONE ACETONIDE: 1 CREAM TOPICAL at 17:05

## 2024-06-15 RX ADMIN — METOPROLOL TARTRATE 25 MG: 25 TABLET, FILM COATED ORAL at 21:13

## 2024-06-15 RX ADMIN — PANTOPRAZOLE SODIUM 40 MG: 40 TABLET, DELAYED RELEASE ORAL at 06:05

## 2024-06-15 RX ADMIN — APIXABAN 5 MG: 5 TABLET, FILM COATED ORAL at 17:03

## 2024-06-15 RX ADMIN — VANCOMYCIN HYDROCHLORIDE 750 MG: 750 INJECTION, SOLUTION INTRAVENOUS at 10:20

## 2024-06-15 RX ADMIN — ZINC SULFATE 220 MG (50 MG) CAPSULE 220 MG: CAPSULE at 10:20

## 2024-06-15 RX ADMIN — HYDROXYCHLOROQUINE SULFATE 200 MG: 200 TABLET ORAL at 17:04

## 2024-06-15 RX ADMIN — Medication 12.5 MG: at 10:20

## 2024-06-15 RX ADMIN — LIDOCAINE 5% 1 PATCH: 700 PATCH TOPICAL at 10:20

## 2024-06-15 RX ADMIN — APIXABAN 5 MG: 5 TABLET, FILM COATED ORAL at 10:20

## 2024-06-15 RX ADMIN — NYSTATIN: 100000 POWDER TOPICAL at 17:04

## 2024-06-15 RX ADMIN — HYDROXYCHLOROQUINE SULFATE 200 MG: 200 TABLET ORAL at 10:20

## 2024-06-15 RX ADMIN — PREDNISONE 5 MG: 5 TABLET ORAL at 17:03

## 2024-06-15 NOTE — PROGRESS NOTES
Progress Note - Infectious Disease   Bhavna Al 73 y.o. female MRN: [id number]  Unit/Bed#: [facility] 820-01 Encounter: [id number]      Impression/Plan:  1.  MRSA bacteremia.  With MRSA and coagulase-negative Staphylococcus isolated in 1 out of 2 blood cultures.  While this could be a contaminant with only 1 out of 2 sets positive, in the setting of clinical sepsis and significant cutaneous breakdown, more likely a true bacteremia.  Fortunately the patient remains hemodynamically stable.  Repeat blood cultures are negative.  Transthoracic echocardiogram limited but without vegetation seen  -Continue intravenous vancomycin through 7/22/2024 to complete 6 weeks of treatment  -Pharmacy follow-up for vancomycin dose management  -Place PICC line     2.  T9-10 discitis/vertebral osteomyelitis.  Without epidural collection seen.  Neurologically stable.  Sedimentation rate is 41  -Antibiotics as above  -Neurosurgical follow-up  -Plan intravenous vancomycin through 7/22/2024 to complete 6 weeks of IV antibiotics  -Check CBC with differential, BMP, sedimentation rate and CRP weekly while on the IV antibiotics  -Anticipate transition to oral antibiotics, likely doxycycline after completion of the IV antibiotics until sedimentation rate has normalized or stabilized.  -Messaged office about need for office and laboratory follow-up     3.  Systemic inflammatory response syndrome.  With fever and tachycardia.  Suspect secondary to the gram-positive bacteremia.  Consideration for the possibly of other etiologies such as adrenal insufficiency.  Much improved.  -Antibiotics as above  -Steroids as per the primary service  -Recheck CBC with differential and BMP to make sure no developing toxicities  -Supportive care     4.  Rash.  With baseline psoriasis by report. Concern for the possibility of staph scalded skin syndrome as the patient has had before.  Biopsy cultures growing MRSA.  Pathology consistent with pustular psoriasis.  -Topical  treatment as per dermatology  -Dermatology follow-up     5.  Acute encephalopathy.  Patient quite lethargic over the last 3 to 4 days.  Suspect all related to the patient's primary process as above.  Remains hemodynamically stable.  Now seems to be back to baseline although some waxing and waning  -Monitor cognition  -No additional ID workup for now     5.  Rheumatoid arthritis.  On hydroxychloroquine and prednisone.  Risk factor for relative adrenal insufficiency.  -Weaning off stress dose steroids as per the primary service     6.  Acute kidney injury.  Suspect prerenal issues playing a significant role in the setting of his systemic illness.  Improved.  Bump in the sodium noted.  -Dose adjusted antibiotics  -Volume management  -Recheck BMP    Discussed with the primary service the plan to continue the vancomycin and they agree with the plan    Antibiotics:  Vancomycin 8  Negative blood cultures 5    Subjective:  Patient has no fever, chills, sweats; no nausea, vomiting, diarrhea; no cough, shortness of breath; no pain. No new symptoms.    Objective:  Vitals:  Temp:  [97.7 °F (36.5 °C)-99.2 °F (37.3 °C)] 97.7 °F (36.5 °C)  HR:  [87-92] 92  Resp:  [16-18] 16  BP: (131-149)/() 135/72  SpO2:  [97 %-98 %] 97 %  Temp (24hrs), Av.3 °F (36.8 °C), Min:97.7 °F (36.5 °C), Max:99.2 °F (37.3 °C)  Current: Temperature: 97.7 °F (36.5 °C)    Physical Exam:   General Appearance:  Alert, interactive, nontoxic, no acute distress.   Throat: Oropharynx moist without lesions.    Lungs:   Clear to auscultation bilaterally; no wheezes, rhonchi or rales; respirations unlabored   Heart:  RRR; no murmur, rub or gallop   Abdomen:   Soft, non-tender, non-distended, positive bowel sounds.     Extremities: No clubbing, cyanosis or edema   Skin: Rash improved.       Labs, Imaging, & Other studies:   All pertinent labs and imaging studies were personally reviewed  Results from last 7 days   Lab Units 06/15/24  0601 24  0577  06/09/24  0823   WBC Thousand/uL 5.36 4.88 6.94   HEMOGLOBIN g/dL 10.8* 10.7* 10.9*   PLATELETS Thousands/uL 163 161 171     Results from last 7 days   Lab Units 06/15/24  0601 06/12/24  0449 06/11/24  0540   SODIUM mmol/L 149* 146 146   POTASSIUM mmol/L 3.2* 3.8 3.4*   CHLORIDE mmol/L 116* 111* 109*   CO2 mmol/L 28 27 27   BUN mg/dL 27* 32* 37*   CREATININE mg/dL 1.16 1.10 1.29   EGFR ml/min/1.73sq m 46 49 40   CALCIUM mg/dL 8.9 9.0 9.0     Results from last 7 days   Lab Units 06/09/24  1553   BLOOD CULTURE  No Growth After 5 Days.         Results from last 7 days   Lab Units 06/11/24  0540   CRP mg/L 38.6*

## 2024-06-15 NOTE — ASSESSMENT & PLAN NOTE
Possible secondary adrenal insufficiency in the setting of use of chronic prednisone  Hydrocortisone being tapered as above  Continue home dose prednisone

## 2024-06-15 NOTE — ASSESSMENT & PLAN NOTE
Wt Readings from Last 3 Encounters:   06/15/24 107 kg (236 lb 15.9 oz)   06/08/24 103 kg (228 lb)   02/19/24 113 kg (249 lb)     Last EF 65% in 1/2024 although poor windows; prior TTE in 11/2022 with EF 55%  Home Lasix held as hypovolemic/AMANDA  Resume as able  Monitor volume status

## 2024-06-15 NOTE — ASSESSMENT & PLAN NOTE
Presented on 6/6/24 with generalized weakness, requiring increased assistance with ADL's, back pain and poor oral intake  Had a similar presentation in 1/2024 due to SSSS with septic shock and gram positive bacteremia requiring prolonged IV antibiotics  Was initially begun on Cefepime and Vancomycin  1 of 2 blood cultures positive for MRSA and coagulase-negative staphylococcus  Cefepime discontinued on 6/7/24 and Vancomycin continued  MRI thoracic spine (6/9/24) - Findings suspicious for discitis/osteomyelitis at T9-T10. No epidural collection.  Wound cultures - staph aureus  TTE - no vegetations  Linezolid added on 6/10/24   Repeat blood culture from 6/10/2024 negativ  Monitor temperature, WBC, follow-up repeat blood cultures  ID is following, with plan for 6 weeks of IV antibiotic through 7/22/2024  PICC line placement was ordered

## 2024-06-15 NOTE — PROGRESS NOTES
Bhavna Al is a 73 y.o. female who is currently ordered Vancomycin IV with management by the Pharmacy Consult service.  Relevant clinical data and objective / subjective history reviewed.  Vancomycin Assessment:  Indication and Goal AUC/Trough: Soft tissue (goal -600, trough >10), -600, trough >10  Clinical Status: stable  Micro: ID following     Renal Function:  SCr: 1.16 mg/dL  CrCl: 49 mL/min  Renal replacement: Not on dialysis  Days of Therapy: 10  Current Dose: 750mg IV q24h  Vancomycin Plan:  New Dosing: continue current dosing  Estimated AUC: 478 mcg*hr/mL  Estimated Trough: 15.3 mcg/mL  Next Level: 6/16 am labs   Renal Function Monitoring: Daily BMP and UOP  Pharmacy will continue to follow closely for s/sx of nephrotoxicity, infusion reactions and appropriateness of therapy.  BMP and CBC will be ordered per protocol. We will continue to follow the patient’s culture results and clinical progress daily.    Kelton Armenta, Pharmacist

## 2024-06-15 NOTE — ASSESSMENT & PLAN NOTE
Follows with rheumatology outpatient  Continue home hydroxychloroquine 200 mg twice daily   Continue home dose prednisone

## 2024-06-15 NOTE — PLAN OF CARE
Problem: PAIN - ADULT  Goal: Verbalizes/displays adequate comfort level or baseline comfort level  Description: Interventions:  - Encourage patient to monitor pain and request assistance  - Assess pain using appropriate pain scale  - Administer analgesics based on type and severity of pain and evaluate response  - Implement non-pharmacological measures as appropriate and evaluate response  - Consider cultural and social influences on pain and pain management  - Notify physician/advanced practitioner if interventions unsuccessful or patient reports new pain  Outcome: Progressing     Problem: INFECTION - ADULT  Goal: Absence or prevention of progression during hospitalization  Description: INTERVENTIONS:  - Assess and monitor for signs and symptoms of infection  - Monitor lab/diagnostic results  - Monitor all insertion sites, i.e. indwelling lines, tubes, and drains  - Monitor endotracheal if appropriate and nasal secretions for changes in amount and color  - [place] appropriate cooling/warming therapies per order  - Administer medications as ordered  - Instruct and encourage patient and family to use good hand hygiene technique  - Identify and instruct in appropriate isolation precautions for identified infection/condition  Outcome: Progressing  Goal: Absence of fever/infection during neutropenic period  Description: INTERVENTIONS:  - Monitor WBC    Outcome: Progressing     Problem: SAFETY ADULT  Goal: Patient will remain free of falls  Description: INTERVENTIONS:  - Educate patient/family on patient safety including physical limitations  - Instruct patient to call for assistance with activity   - Consult OT/PT to assist with strengthening/mobility   - Keep Call bell within reach  - Keep bed low and locked with side rails adjusted as appropriate  - Keep care items and personal belongings within reach  - Initiate and maintain comfort rounds  - Make Fall Risk Sign visible to staff  - Offer Toileting every 2 Hours,  in advance of need  - Initiate/Maintain bed alarm  - Apply yellow socks and bracelet for high fall risk patients  - Consider moving patient to room near nurses station  Outcome: Progressing  Goal: Maintain or return to baseline ADL function  Description: INTERVENTIONS:  -  Assess patient's ability to carry out ADLs; assess patient's baseline for ADL function and identify physical deficits which impact ability to perform ADLs (bathing, care of mouth/teeth, toileting, grooming, dressing, etc.)  - Assess/evaluate cause of self-care deficits   - Assess range of motion  - Assess patient's mobility; develop plan if impaired  - Assess patient's need for assistive devices and provide as appropriate  - Encourage maximum independence but intervene and supervise when necessary  - Involve family in performance of ADLs  - Assess for home care needs following discharge   - Consider OT consult to assist with ADL evaluation and planning for discharge  - Provide patient education as appropriate  Outcome: Progressing  Goal: Maintains/Returns to pre admission functional level  Description: INTERVENTIONS:  - Perform AM-PAC 6 Click Basic Mobility/ Daily Activity assessment daily.  - Set and communicate daily mobility goal to care team and patient/family/caregiver.   - Collaborate with rehabilitation services on mobility goals if consulted  - Perform Range of Motion 3 times a day.  - Reposition patient every 2 hours.  - Out of bed for toileting  - Record patient progress and toleration of activity level   Outcome: Progressing     Problem: DISCHARGE PLANNING  Goal: Discharge to home or other facility with appropriate resources  Description: INTERVENTIONS:  - Identify barriers to discharge w/patient and caregiver  - Arrange for needed discharge resources and transportation as appropriate  - Identify discharge learning needs (meds, wound care, etc.)  - Arrange for interpretive services to assist at discharge as needed  - Refer to Case  Management Department for coordinating discharge planning if the patient needs post-hospital services based on physician/advanced practitioner order or complex needs related to functional status, cognitive ability, or social support system  Outcome: Progressing     Problem: Knowledge Deficit  Goal: Patient/family/caregiver demonstrates understanding of disease process, treatment plan, medications, and discharge instructions  Description: Complete learning assessment and assess knowledge base.  Interventions:  - Provide teaching at level of understanding  - Provide teaching via preferred learning methods  Outcome: Progressing     Problem: Prexisting or High Potential for Compromised Skin Integrity  Goal: Skin integrity is maintained or improved  Description: INTERVENTIONS:  - Identify patients at risk for skin breakdown  - Assess and monitor skin integrity  - Assess and monitor nutrition and hydration status  - Monitor labs   - Assess for incontinence   - Turn and reposition patient  - Assist with mobility/ambulation  - Relieve pressure over bony prominences  - Avoid friction and shearing  - Provide appropriate hygiene as needed including keeping skin clean and dry  - Evaluate need for skin moisturizer/barrier cream  - Collaborate with interdisciplinary team   - Patient/family teaching  - Consider wound care consult   Outcome: Progressing     Problem: Nutrition/Hydration-ADULT  Goal: Nutrient/Hydration intake appropriate for improving, restoring or maintaining nutritional needs  Description: Monitor and assess patient's nutrition/hydration status for malnutrition. Collaborate with interdisciplinary team and initiate plan and interventions as ordered.  Monitor patient's weight and dietary intake as ordered or per policy. Utilize nutrition screening tool and intervene as necessary. Determine patient's food preferences and provide high-protein, high-caloric foods as appropriate.     INTERVENTIONS:  - Monitor oral  intake, urinary output, labs, and treatment plans  - Assess nutrition and hydration status and recommend course of action  - Evaluate amount of meals eaten  - Assist patient with eating if necessary   - Allow adequate time for meals  - Recommend/ encourage appropriate diets, oral nutritional supplements, and vitamin/mineral supplements  - Order, calculate, and assess calorie counts as needed  - Recommend, monitor, and adjust tube feedings and TPN/PPN based on assessed needs  - Assess need for intravenous fluids  - Provide specific nutrition/hydration education as appropriate  - Include patient/family/caregiver in decisions related to nutrition  Outcome: Progressing

## 2024-06-15 NOTE — ASSESSMENT & PLAN NOTE
Lab Results   Component Value Date    EGFR 46 06/15/2024    EGFR 49 06/12/2024    EGFR 40 06/11/2024    CREATININE 1.16 06/15/2024    CREATININE 1.10 06/12/2024    CREATININE 1.29 06/11/2024     CKD 3 at baseline with baseline Cr 0.8-1.0  AMANDA on admission with creatinine 1.75 and worsened to 1.85  Likely prerenal azotemia due to reduced p.o. intake for several days prior to admission  Creatinine improving   Continue to hold diuretics given current hypernatremia

## 2024-06-15 NOTE — RESTORATIVE TECHNICIAN NOTE
Restorative Technician Note      Patient Name: Bhavna Al     Restorative Tech Visit Date: 06/15/24  Note Type: Mobility  Patient Position Upon Consult: Supine  Activity Performed: Range of motion; Repositioned; Other (Comment) (bed mobility; supine ROM; weight shifting/pressure relief education)  Education Provided: Yes  Patient Position at End of Consult: Supine; All needs within reach; Other (comment) (left in the care of PCA)    Rika Gandhi  DPT, Restorative Technician

## 2024-06-15 NOTE — PROGRESS NOTES
[facility]  Progress Note  Name: Bhavna Al I  MRN: [id number]  Unit/Bed#: [facility] 820-01 I Date of Admission: 6/6/2024   Date of Service: 6/15/2024 I Hospital Day: 9    Assessment & Plan   * MRSA bacteremia  Assessment & Plan  Presented on 6/6/24 with generalized weakness, requiring increased assistance with ADL's, back pain and poor oral intake  Had a similar presentation in 1/2024 due to SSSS with septic shock and gram positive bacteremia requiring prolonged IV antibiotics  Was initially begun on Cefepime and Vancomycin  1 of 2 blood cultures positive for MRSA and coagulase-negative staphylococcus  Cefepime discontinued on 6/7/24 and Vancomycin continued  MRI thoracic spine (6/9/24) - Findings suspicious for discitis/osteomyelitis at T9-T10. No epidural collection.  Wound cultures - staph aureus  TTE - no vegetations  Linezolid added on 6/10/24   Repeat blood culture from 6/10/2024 negativ  Monitor temperature, WBC, follow-up repeat blood cultures  ID is following, with plan for 6 weeks of IV antibiotic through 7/22/2024  PICC line placement was ordered    Acute osteomyelitis of thoracic spine (HCC)  Assessment & Plan  MRI with T9-T10 discitis/vertebral osteomyelitis without epidural collection  Per neurosurgery - defer bracing in the setting of extensive skin changes  Antibiotics as above    Acute metabolic encephalopathy  Assessment & Plan  Lethargy on presentation in the setting of above  Improved  Supportive care   Plan as above    Psoriasis  Assessment & Plan  Has multiple wounds  Recent diagnosis of SSSS  S/p punch biopsy by dermatology  Wound cultures - staph aureus  Continue with antibiotic treatment and local care      Acute kidney injury superimposed on CKD  (HCC)  Assessment & Plan  Lab Results   Component Value Date    EGFR 46 06/15/2024    EGFR 49 06/12/2024    EGFR 40 06/11/2024    CREATININE 1.16 06/15/2024    CREATININE 1.10 06/12/2024    CREATININE 1.29  06/11/2024     CKD 3 at baseline with baseline Cr 0.8-1.0  AMANDA on admission with creatinine 1.75 and worsened to 1.85  Likely prerenal azotemia due to reduced p.o. intake for several days prior to admission  Creatinine improving   Continue to hold diuretics given current hypernatremia    SIRS (systemic inflammatory response syndrome) ([facility])  Assessment & Plan  With fever and tachycardia  Likely due to MRSA bacteremia  Plan as above    Pressure ulcer of left buttock, stage 3 ([facility])  Assessment & Plan  POA  Wound care    Secondary adrenal insufficiency ([facility])  Assessment & Plan  Possible secondary adrenal insufficiency in the setting of use of chronic prednisone  Hydrocortisone being tapered as above  Continue home dose prednisone    Dysphagia  Assessment & Plan  Diet advanced to dental soft per speech recommendation    Hypotension  Assessment & Plan  Blood pressure is improving  Monitor    Hypernatremia  Assessment & Plan  Monitor sodium level  Replace potassium    Chronic diastolic heart failure ([facility])  Assessment & Plan  Wt Readings from Last 3 Encounters:   06/15/24 107 kg (236 lb 15.9 oz)   06/08/24 103 kg (228 lb)   02/19/24 113 kg (249 lb)     Last EF 65% in 1/2024 although poor windows; prior TTE in 11/2022 with EF 55%  Home Lasix held as hypovolemic/AMANDA  Resume as able  Monitor volume status      Paroxysmal atrial fibrillation (HCC)  Assessment & Plan  Maintained at home with metoprolol tartrate 25mg BID  Continue Eliquis    Essential hypertension  Assessment & Plan  home Lopressor 25 mg twice daily  Continue current dose of Lopressor    GERD (gastroesophageal reflux disease)  Assessment & Plan  Continue home PPI    Rheumatoid arthritis (HCC)  Assessment & Plan  Follows with rheumatology outpatient  Continue home hydroxychloroquine 200 mg twice daily   Continue home dose prednisone             VTE Pharmacologic Prophylaxis: VTE Score: 7 High Risk (Score >/= 5) - Pharmacological DVT Prophylaxis Ordered: apixaban  (Eliquis). Sequential Compression Devices Ordered.    Mobility:   Basic Mobility Inpatient Raw Score: 7  JH-HLM Goal: 2: Bed activities/Dependent transfer  JH-HLM Achieved: 2: Bed activities/Dependent transfer  JH-HLM Goal achieved. Continue to encourage appropriate mobility.    Patient Centered Rounds: I performed bedside rounds with nursing staff today.   Discussions with Specialists or Other Care Team Provider: ID    Education and Discussions with Family / Patient: Attempted to update  (daughter) via phone. Unable to contact.    Total Time Spent on Date of Encounter in care of patient: 35 mins. This time was spent on one or more of the following: performing physical exam; counseling and coordination of care; obtaining or reviewing history; documenting in the medical record; reviewing/ordering tests, medications or procedures; communicating with other healthcare professionals and discussing with patient's family/caregivers.    Current Length of Stay: 9 day(s)  Current Patient Status: Inpatient   Certification Statement: The patient will continue to require additional inpatient hospital stay due to PICC line placement and hypernatremia      Code Status: Level 1 - Full Code    Subjective:   Patient seen and examined  Comfortable in bed  No event overnight  PICC line was not inserted yesterday, unable to reach family    Objective:     Vitals:   Temp (24hrs), Av.3 °F (36.8 °C), Min:97.7 °F (36.5 °C), Max:99.2 °F (37.3 °C)    Temp:  [97.7 °F (36.5 °C)-99.2 °F (37.3 °C)] 97.7 °F (36.5 °C)  HR:  [87-92] 88  Resp:  [16-18] 16  BP: (129-149)/() 129/94  SpO2:  [97 %-98 %] 97 %  Body mass index is 41.98 kg/m².     Input and Output Summary (last 24 hours):     Intake/Output Summary (Last 24 hours) at 6/15/2024 1151  Last data filed at 6/15/2024 0800  Gross per 24 hour   Intake 480 ml   Output 150 ml   Net 330 ml       Physical Exam:   Physical Exam   Patient is awake alert, in no acute distress  Obese  comfortable in bed  Skin with diffuse xerosis  Lung clear to auscultation bilateral  Heart positive S1 S2 no murmur  Abdomen soft nontender  Lower extremities no edema    Additional Data:     Labs:  Results from last 7 days   Lab Units 06/15/24  0601   WBC Thousand/uL 5.36   HEMOGLOBIN g/dL 10.8*   HEMATOCRIT % 35.5   PLATELETS Thousands/uL 163   SEGS PCT % 72   LYMPHO PCT % 16   MONO PCT % 8   EOS PCT % 3     Results from last 7 days   Lab Units 06/15/24  0601   SODIUM mmol/L 149*   POTASSIUM mmol/L 3.2*   CHLORIDE mmol/L 116*   CO2 mmol/L 28   BUN mg/dL 27*   CREATININE mg/dL 1.16   ANION GAP mmol/L 5   CALCIUM mg/dL 8.9   GLUCOSE RANDOM mg/dL 74                       Lines/Drains:  Invasive Devices       Central Venous Catheter Line  Duration             CVC Central Lines 06/07/24 Double Right Internal jugular 7 days              Drain  Duration             External Urinary Catheter -- days                    Central Line:  Goal for removal: Will discontinue when peripheral access obtained.              Imaging: No pertinent imaging reviewed.    Recent Cultures (last 7 days):   Results from last 7 days   Lab Units 06/09/24  1553   BLOOD CULTURE  No Growth After 5 Days.       Last 24 Hours Medication List:   Current Facility-Administered Medications   Medication Dose Route Frequency Provider Last Rate    acetaminophen  650 mg Rectal Q6H PRN Marzena Mendez MD      albuterol  2 puff Inhalation Q6H PRN Jersey Pretty, DO      apixaban  5 mg Oral BID Cathy Ribeiro MD      cholecalciferol  1,000 Units Oral Daily Jersey Pretty, DO      clobetasol   Topical BID Jersey Pretty, DO      hydroxychloroquine  200 mg Oral BID Jersey Pretty, DO      lidocaine  1 patch Topical Daily Jersey Pretty, DO      metoprolol tartrate  25 mg Oral Q12H [facility] Umesh Kendrick, DO      miconazole   Topical BID Jersey Pretty, DO      nystatin   Topical BID Jersey Pretty, DO       pantoprazole  40 mg Oral Early Morning Umesh Kendrick DO      polyethylene glycol  17 g Oral Daily PRN Jersey Pretty DO      predniSONE  5 mg Oral BID With Meals Umesh Kendrick DO      senna-docusate sodium  1 tablet Oral Daily Jersey Pretty DO      triamcinolone   Topical BID Jersey Pretty DO      vancomycin  10 mg/kg (Adjusted) Intravenous Q24H Bhavna Cunningham  mg (06/15/24 1020)    zinc sulfate  220 mg Oral Daily Jersey Pretty DO          Today, Patient Was Seen By: Umesh Kendrick DO    **Please Note: This note may have been constructed using a voice recognition system.**

## 2024-06-16 LAB
ANION GAP SERPL CALCULATED.3IONS-SCNC: 9 MMOL/L (ref 4–13)
BUN SERPL-MCNC: 19 MG/DL (ref 5–25)
CALCIUM SERPL-MCNC: 8.7 MG/DL (ref 8.4–10.2)
CHLORIDE SERPL-SCNC: 115 MMOL/L (ref 96–108)
CK SERPL-CCNC: 39 U/L (ref 26–192)
CO2 SERPL-SCNC: 27 MMOL/L (ref 21–32)
CREAT SERPL-MCNC: 1.02 MG/DL (ref 0.6–1.3)
GFR SERPL CREATININE-BSD FRML MDRD: 53 ML/MIN/1.73SQ M
GLUCOSE SERPL-MCNC: 66 MG/DL (ref 65–140)
POTASSIUM SERPL-SCNC: 3.6 MMOL/L (ref 3.5–5.3)
SODIUM SERPL-SCNC: 151 MMOL/L (ref 135–147)
VANCOMYCIN SERPL-MCNC: 19.8 UG/ML (ref 10–20)

## 2024-06-16 PROCEDURE — 82550 ASSAY OF CK (CPK): CPT | Performed by: INTERNAL MEDICINE

## 2024-06-16 PROCEDURE — 99232 SBSQ HOSP IP/OBS MODERATE 35: CPT | Performed by: INTERNAL MEDICINE

## 2024-06-16 PROCEDURE — 80202 ASSAY OF VANCOMYCIN: CPT | Performed by: INTERNAL MEDICINE

## 2024-06-16 PROCEDURE — 80048 BASIC METABOLIC PNL TOTAL CA: CPT | Performed by: INTERNAL MEDICINE

## 2024-06-16 PROCEDURE — 99233 SBSQ HOSP IP/OBS HIGH 50: CPT | Performed by: INTERNAL MEDICINE

## 2024-06-16 RX ORDER — DEXTROSE MONOHYDRATE 50 MG/ML
100 INJECTION, SOLUTION INTRAVENOUS CONTINUOUS
Status: DISCONTINUED | OUTPATIENT
Start: 2024-06-16 | End: 2024-06-17

## 2024-06-16 RX ORDER — ACETAMINOPHEN 325 MG/1
975 TABLET ORAL EVERY 8 HOURS SCHEDULED
Status: DISCONTINUED | OUTPATIENT
Start: 2024-06-16 | End: 2024-06-18 | Stop reason: HOSPADM

## 2024-06-16 RX ADMIN — MICONAZOLE NITRATE: 20 CREAM TOPICAL at 16:54

## 2024-06-16 RX ADMIN — HYDROXYCHLOROQUINE SULFATE 200 MG: 200 TABLET ORAL at 08:52

## 2024-06-16 RX ADMIN — CLOBETASOL PROPIONATE: 0.5 CREAM TOPICAL at 08:53

## 2024-06-16 RX ADMIN — ACETAMINOPHEN 975 MG: 325 TABLET, FILM COATED ORAL at 21:03

## 2024-06-16 RX ADMIN — CLOBETASOL PROPIONATE: 0.5 CREAM TOPICAL at 16:53

## 2024-06-16 RX ADMIN — PANTOPRAZOLE SODIUM 40 MG: 40 TABLET, DELAYED RELEASE ORAL at 05:23

## 2024-06-16 RX ADMIN — NYSTATIN: 100000 POWDER TOPICAL at 08:53

## 2024-06-16 RX ADMIN — METOPROLOL TARTRATE 25 MG: 25 TABLET, FILM COATED ORAL at 20:24

## 2024-06-16 RX ADMIN — ZINC SULFATE 220 MG (50 MG) CAPSULE 220 MG: CAPSULE at 08:45

## 2024-06-16 RX ADMIN — PREDNISONE 5 MG: 5 TABLET ORAL at 08:45

## 2024-06-16 RX ADMIN — HYDROXYCHLOROQUINE SULFATE 200 MG: 200 TABLET ORAL at 16:53

## 2024-06-16 RX ADMIN — APIXABAN 5 MG: 5 TABLET, FILM COATED ORAL at 08:45

## 2024-06-16 RX ADMIN — METOPROLOL TARTRATE 25 MG: 25 TABLET, FILM COATED ORAL at 08:45

## 2024-06-16 RX ADMIN — MICONAZOLE NITRATE: 20 CREAM TOPICAL at 08:53

## 2024-06-16 RX ADMIN — PREDNISONE 5 MG: 5 TABLET ORAL at 16:53

## 2024-06-16 RX ADMIN — APIXABAN 5 MG: 5 TABLET, FILM COATED ORAL at 16:53

## 2024-06-16 RX ADMIN — SENNOSIDES AND DOCUSATE SODIUM 1 TABLET: 50; 8.6 TABLET ORAL at 08:45

## 2024-06-16 RX ADMIN — Medication 1000 UNITS: at 08:45

## 2024-06-16 RX ADMIN — TRIAMCINOLONE ACETONIDE: 1 CREAM TOPICAL at 08:53

## 2024-06-16 RX ADMIN — TRIAMCINOLONE ACETONIDE: 1 CREAM TOPICAL at 16:54

## 2024-06-16 RX ADMIN — LIDOCAINE 5% 1 PATCH: 700 PATCH TOPICAL at 08:45

## 2024-06-16 RX ADMIN — VANCOMYCIN HYDROCHLORIDE 750 MG: 750 INJECTION, SOLUTION INTRAVENOUS at 08:45

## 2024-06-16 RX ADMIN — NYSTATIN: 100000 POWDER TOPICAL at 16:54

## 2024-06-16 RX ADMIN — DEXTROSE 100 ML/HR: 5 SOLUTION INTRAVENOUS at 18:28

## 2024-06-16 RX ADMIN — ACETAMINOPHEN 975 MG: 325 TABLET, FILM COATED ORAL at 09:12

## 2024-06-16 RX ADMIN — DEXTROSE 100 ML/HR: 5 SOLUTION INTRAVENOUS at 09:12

## 2024-06-16 NOTE — ASSESSMENT & PLAN NOTE
Wt Readings from Last 3 Encounters:   06/16/24 108 kg (238 lb 1.6 oz)   06/08/24 103 kg (228 lb)   02/19/24 113 kg (249 lb)     Last EF 65% in 1/2024 although poor windows; prior TTE in 11/2022 with EF 55%  Home Lasix held as hypovolemic/AMANDA  Resume as able  Monitor volume status

## 2024-06-16 NOTE — RESTORATIVE TECHNICIAN NOTE
Restorative Technician Note      Patient Name: Bhavna BOUDREAUX Al     Restorative Tech Visit Date: 06/16/24  Note Type: Mobility  Patient Position Upon Consult: Supine  Activity Performed: Range of motion; Repositioned  Education Provided: Yes  Patient Position at End of Consult: Supine; All needs within reach    Rika Gandhi  DPT, Restorative Technician             Rest and drink extra fluids  Start antibiotic  Take probiotic  Medrol dose pack as prescribed  This may also help with you laryngitis  If no improvement with voice I would recommend you follow up with ENT  Referral placed  Tylenol as needed for pain  Cool mist humidification can be helpful  Follow up with PCP if no improvement  Go to ER with worsening symptoms, chest pain, difficultly breathing  Acute Bronchitis   AMBULATORY CARE:   Acute bronchitis  is swelling and irritation in the air passages of your lungs  This irritation may cause you to cough or have other breathing problems  Acute bronchitis often starts because of another illness, such as a cold or the flu  The illness spreads from your nose and throat to your windpipe and airways  Bronchitis is often called a chest cold  Acute bronchitis lasts about 3 to 6 weeks and is usually not a serious illness  Your cough can last for several weeks  You may have any of the following symptoms:   · A cough with sputum that may be clear, yellow, or green    · Feeling more tired than usual, and body aches    · A fever and chills    · Wheezing when you breathe    · A tight chest or pain when you breathe or cough  Seek care immediately if:   · You cough up blood  · Your lips or fingernails turn blue  · You feel like you are not getting enough air when you breathe  Contact your healthcare provider if:   · You have a fever  · Your breathing problems do not go away or get worse  · Your cough does not get better within 4 weeks  · You have questions or concerns about your condition or care  Self-care:   · Get more rest   Rest helps your body to heal  Slowly start to do more each day  Rest when you feel it is needed  · Avoid irritants in the air  Avoid chemicals, fumes, and dust  Wear a face mask if you must work around dust or fumes  Stay inside on days when air pollution levels are high   If you have allergies, stay inside when pollen counts are high  Do not use aerosol products, such as spray-on deodorant, bug spray, and hair spray  · Do not smoke or be around others who smoke  Nicotine and other chemicals in cigarettes and cigars damages the cilia that move mucus out of your lungs  Ask your healthcare provider for information if you currently smoke and need help to quit  E-cigarettes or smokeless tobacco still contain nicotine  Talk to your healthcare provider before you use these products  · Drink liquids as directed  Liquids help keep your air passages moist and help you cough up mucus  You may need to drink more liquids when you have acute bronchitis  Ask how much liquid to drink each day and which liquids are best for you  · Use a humidifier or vaporizer  Use a cool mist humidifier or a vaporizer to increase air moisture in your home  This may make it easier for you to breathe and help decrease your cough  Prevent acute bronchitis by doing the following:   · Get the vaccinations you need  Ask your healthcare provider if you should get vaccinated against the flu or pneumonia  · Prevent the spread of germs  You can decrease your risk of acute bronchitis and other illnesses by doing the following:     [facility] [place] [facility] your hands often with soap and water  Carry germ-killing hand lotion or gel with you  You can use the lotion or gel to clean your hands when soap and water are not available  ¨ Do not touch your eyes, nose, or mouth unless you have washed your hands first     ¨ Always cover your mouth when you cough to prevent the spread of germs  It is best to cough into a tissue or your shirt sleeve instead of into your hand  Ask those around you cover their mouths when they cough  ¨ Try to avoid people who have a cold or the flu  If you are sick, stay away from others as much as possible  Medicines:   Your healthcare provider may  give you any of the following:  · Ibuprofen or acetaminophen  are medicines that help lower your fever  They are available without a doctor's order  Ask your healthcare provider which medicine is right for you  Ask how much to take and how often to take it  Follow directions  These medicines can cause stomach bleeding if not taken correctly  Ibuprofen can cause kidney damage  Do not take ibuprofen if you have kidney disease, an ulcer, or allergies to aspirin  Acetaminophen can cause liver damage  Do not take more than 4,000 milligrams in 24 hours  · Decongestants  help loosen mucus in your lungs and make it easier to cough up  This can help you breathe easier  · Cough suppressants  decrease your urge to cough  If your cough produces mucus, do not take a cough suppressant unless your healthcare provider tells you to  Your healthcare provider may suggest that you take a cough suppressant at night so you can rest     · Inhalers  may be given  Your healthcare provider may give you one or more inhalers to help you breathe easier and cough less  An inhaler gives your medicine to open your airways  Ask your healthcare provider to show you how to use your inhaler correctly  Follow up with your healthcare provider as directed:  Write down questions you have so you will remember to ask them during your follow-up visits  © 2017 [employer]  Information is for End User's use only and may not be sold, redistributed or otherwise used for commercial purposes  All illustrations and images included in CareNotes® are the copyrighted property of [employer] , [employer]  or [employer]  The above information is an  only  It is not intended as medical advice for individual conditions or treatments  Talk to your doctor, nurse or pharmacist before following any medical regimen to see if it is safe and effective for you

## 2024-06-16 NOTE — ASSESSMENT & PLAN NOTE
Lab Results   Component Value Date    EGFR 53 06/16/2024    EGFR 46 06/15/2024    EGFR 49 06/12/2024    CREATININE 1.02 06/16/2024    CREATININE 1.16 06/15/2024    CREATININE 1.10 06/12/2024     CKD 3 at baseline with baseline Cr 0.8-1.0  AMANDA on admission with creatinine 1.75 and worsened to 1.85  Likely prerenal azotemia due to reduced p.o. intake for several days prior to admission  Creatinine improving   Continue to hold diuretics given current hypernatremia

## 2024-06-16 NOTE — PROGRESS NOTES
Bhavna Al is a 73 y.o. female who is currently ordered Vancomycin IV with management by the Pharmacy Consult service.  Relevant clinical data and objective / subjective history reviewed.  Vancomycin Assessment:  Indication and Goal AUC/Trough: Soft tissue (goal -600, trough >10), -600, trough >10  Micro:     Renal Function:  SCr: 1.02 mg/dL  CrCl: 56 mL/min  Renal replacement: Not on dialysis  Days of Therapy: 11  Current Dose: 750mg IV q24h  Vancomycin Plan:  New Dosing: continue current dosing  Estimated AUC: 481 mcg*hr/mL  Estimated Trough: 15.5 mcg/mL  Next Level: 6-18-24 @ 0600  Renal Function Monitoring: Daily BMP and UOP  Pharmacy will continue to follow closely for s/sx of nephrotoxicity, infusion reactions and appropriateness of therapy.  BMP and CBC will be ordered per protocol. We will continue to follow the patient’s culture results and clinical progress daily.    Jah Gonzales, Pharmacist

## 2024-06-16 NOTE — PLAN OF CARE
Problem: PAIN - ADULT  Goal: Verbalizes/displays adequate comfort level or baseline comfort level  Description: Interventions:  - Encourage patient to monitor pain and request assistance  - Assess pain using appropriate pain scale  - Administer analgesics based on type and severity of pain and evaluate response  - Implement non-pharmacological measures as appropriate and evaluate response  - Consider cultural and social influences on pain and pain management  - Notify physician/advanced practitioner if interventions unsuccessful or patient reports new pain  Outcome: Progressing     Problem: INFECTION - ADULT  Goal: Absence or prevention of progression during hospitalization  Description: INTERVENTIONS:  - Assess and monitor for signs and symptoms of infection  - Monitor lab/diagnostic results  - Monitor all insertion sites, i.e. indwelling lines, tubes, and drains  - Monitor endotracheal if appropriate and nasal secretions for changes in amount and color  - [place] appropriate cooling/warming therapies per order  - Administer medications as ordered  - Instruct and encourage patient and family to use good hand hygiene technique  - Identify and instruct in appropriate isolation precautions for identified infection/condition  Outcome: Progressing  Goal: Absence of fever/infection during neutropenic period  Description: INTERVENTIONS:  - Monitor WBC    Outcome: Progressing     Problem: DISCHARGE PLANNING  Goal: Discharge to home or other facility with appropriate resources  Description: INTERVENTIONS:  - Identify barriers to discharge w/patient and caregiver  - Arrange for needed discharge resources and transportation as appropriate  - Identify discharge learning needs (meds, wound care, etc.)  - Arrange for interpretive services to assist at discharge as needed  - Refer to Case Management Department for coordinating discharge planning if the patient needs post-hospital services based on physician/advanced  practitioner order or complex needs related to functional status, cognitive ability, or social support system  Outcome: Progressing

## 2024-06-16 NOTE — PROGRESS NOTES
[facility]  Progress Note  Name: Bhavna Al I  MRN: [id number]  Unit/Bed#: [facility] 820-01 I Date of Admission: 6/6/2024   Date of Service: 6/16/2024 I Hospital Day: 10    Assessment & Plan   * MRSA bacteremia  Assessment & Plan  Presented on 6/6/24 with generalized weakness, requiring increased assistance with ADL's, back pain and poor oral intake  Had a similar presentation in 1/2024 due to SSSS with septic shock and gram positive bacteremia requiring prolonged IV antibiotics  Was initially begun on Cefepime and Vancomycin  1 of 2 blood cultures positive for MRSA and coagulase-negative staphylococcus  Cefepime discontinued on 6/7/24 and Vancomycin continued  MRI thoracic spine (6/9/24) - Findings suspicious for discitis/osteomyelitis at T9-T10. No epidural collection.  Wound cultures - staph aureus  TTE - no vegetations  Linezolid added on 6/10/24   Repeat blood culture from 6/10/2024 negativ  Monitor temperature, WBC, follow-up repeat blood cultures  ID is following, with plan for 6 weeks of IV antibiotic through 7/22/2024  PICC line placement was ordered    Acute osteomyelitis of thoracic spine (HCC)  Assessment & Plan  MRI with T9-T10 discitis/vertebral osteomyelitis without epidural collection  Per neurosurgery - defer bracing in the setting of extensive skin changes  Antibiotics as above    Hypernatremia  Assessment & Plan  Likely secondary due to poor oral intake  Start hypotonic IV fluid  Monitor sodium level    Acute metabolic encephalopathy  Assessment & Plan  Lethargy on presentation in the setting of above  Continue supportive care   Plan as above    Psoriasis  Assessment & Plan  Has multiple wounds  Recent diagnosis of SSSS  S/p punch biopsy by dermatology  Wound cultures - staph aureus  Continue with antibiotic treatment and local care      Acute kidney injury superimposed on CKD  (HCC)  Assessment & Plan  Lab Results   Component Value Date    EGFR 53 06/16/2024     EGFR 46 06/15/2024    EGFR 49 06/12/2024    CREATININE 1.02 06/16/2024    CREATININE 1.16 06/15/2024    CREATININE 1.10 06/12/2024     CKD 3 at baseline with baseline Cr 0.8-1.0  AMANDA on admission with creatinine 1.75 and worsened to 1.85  Likely prerenal azotemia due to reduced p.o. intake for several days prior to admission  Creatinine improving   Continue to hold diuretics given current hypernatremia    SIRS (systemic inflammatory response syndrome) ([facility])  Assessment & Plan  With fever and tachycardia  Likely due to MRSA bacteremia  Plan as above    Pressure ulcer of left buttock, stage 3 ([facility])  Assessment & Plan  POA  Wound care    Secondary adrenal insufficiency ([facility])  Assessment & Plan  Possible secondary adrenal insufficiency in the setting of use of chronic prednisone  Hydrocortisone being tapered as above  Continue home dose prednisone    Dysphagia  Assessment & Plan  Diet advanced to dental soft per speech recommendation    Chronic diastolic heart failure ([facility])  Assessment & Plan  Wt Readings from Last 3 Encounters:   06/16/24 108 kg (238 lb 1.6 oz)   06/08/24 103 kg (228 lb)   02/19/24 113 kg (249 lb)     Last EF 65% in 1/2024 although poor windows; prior TTE in 11/2022 with EF 55%  Home Lasix held as hypovolemic/AMANDA  Resume as able  Monitor volume status      Paroxysmal atrial fibrillation ([facility])  Assessment & Plan  Maintained at home with metoprolol tartrate 25mg BID  Continue Eliquis    Essential hypertension  Assessment & Plan  home Lopressor 25 mg twice daily  Continue current dose of Lopressor    GERD (gastroesophageal reflux disease)  Assessment & Plan  Continue home PPI    Rheumatoid arthritis ([facility])  Assessment & Plan  Follows with rheumatology outpatient  Continue home hydroxychloroquine 200 mg twice daily   Continue home dose prednisone             VTE Pharmacologic Prophylaxis: VTE Score: 7 High Risk (Score >/= 5) - Pharmacological DVT Prophylaxis Ordered: apixaban (Eliquis). Sequential  Compression Devices Ordered.    Mobility:   Basic Mobility Inpatient Raw Score: 7  JH-HLM Goal: 2: Bed activities/Dependent transfer  JH-HLM Achieved: 2: Bed activities/Dependent transfer  JH-HLM Goal achieved. Continue to encourage appropriate mobility.    Patient Centered Rounds: I performed bedside rounds with nursing staff today.   Discussions with Specialists or Other Care Team Provider: ID    Education and Discussions with Family / Patient:  Patient.     Total Time Spent on Date of Encounter in care of patient: 35 mins. This time was spent on one or more of the following: performing physical exam; counseling and coordination of care; obtaining or reviewing history; documenting in the medical record; reviewing/ordering tests, medications or procedures; communicating with other healthcare professionals and discussing with patient's family/caregivers.    Current Length of Stay: 10 day(s)  Current Patient Status: Inpatient   Certification Statement: The patient will continue to require additional inpatient hospital stay due to management of MRSA bacteremia      Code Status: Level 1 - Full Code    Subjective:   Patient seen and examined  She feels cold, somnolent  Poor oral intake  Objective:     Vitals:   Temp (24hrs), Av.9 °F (37.2 °C), Min:98.4 °F (36.9 °C), Max:99.5 °F (37.5 °C)    Temp:  [98.4 °F (36.9 °C)-99.5 °F (37.5 °C)] 99.5 °F (37.5 °C)  HR:  [] 93  Resp:  [17-18] 17  BP: (104-133)/(66-97) 126/73  SpO2:  [85 %-100 %] 93 %  Body mass index is 42.18 kg/m².     Input and Output Summary (last 24 hours):     Intake/Output Summary (Last 24 hours) at 2024 0907  Last data filed at 2024 0606  Gross per 24 hour   Intake --   Output 449 ml   Net -449 ml       Physical Exam:   Physical Exam   Patient is comfortable in bed, responding to verbal stimuli  Answering questions  Generalized body tremor  Skin with diffuse xerosis  Lung with decreased breath sound bilateral  Heart positive S1-S2 no  murmur  Abdomen soft nontender  Lower extremities no edema    Additional Data:     Labs:  Results from last 7 days   Lab Units 06/15/24  0601   WBC Thousand/uL 5.36   HEMOGLOBIN g/dL 10.8*   HEMATOCRIT % 35.5   PLATELETS Thousands/uL 163   SEGS PCT % 72   LYMPHO PCT % 16   MONO PCT % 8   EOS PCT % 3     Results from last 7 days   Lab Units 06/16/24  0524   SODIUM mmol/L 151*   POTASSIUM mmol/L 3.6   CHLORIDE mmol/L 115*   CO2 mmol/L 27   BUN mg/dL 19   CREATININE mg/dL 1.02   ANION GAP mmol/L 9   CALCIUM mg/dL 8.7   GLUCOSE RANDOM mg/dL 66                       Lines/Drains:  Invasive Devices       Central Venous Catheter Line  Duration             CVC Central Lines 06/07/24 Double Right Internal jugular 8 days              Drain  Duration             External Urinary Catheter -- days                    Central Line:  Goal for removal: Will discontinue when peripheral access obtained.              Imaging: No pertinent imaging reviewed.    Recent Cultures (last 7 days):   Results from last 7 days   Lab Units 06/09/24  1553   BLOOD CULTURE  No Growth After 5 Days.       Last 24 Hours Medication List:   Current Facility-Administered Medications   Medication Dose Route Frequency Provider Last Rate    acetaminophen  975 mg Oral Q8H [facility] Umesh Kendrick, DO      albuterol  2 puff Inhalation Q6H PRN Jersey Pretty, DO      apixaban  5 mg Oral BID Cathy Ribeiro MD      cholecalciferol  1,000 Units Oral Daily Jersey Pretty, DO      clobetasol   Topical BID Jersey Pretty, DO      dextrose  100 mL/hr Intravenous Continuous Umesh Kendrick, DO      hydroxychloroquine  200 mg Oral BID Jersey Pretty, DO      lidocaine  1 patch Topical Daily Jersey Pretty, DO      metoprolol tartrate  25 mg Oral Q12H [facility] Umesh Kendrick, DO      miconazole   Topical BID Jersey Pretty, DO      nystatin   Topical BID Jersey Pretty, DO      pantoprazole  40 mg Oral Early Morning Umesh Kendrick, DO       polyethylene glycol  17 g Oral Daily PRN Jersey Pretty DO      predniSONE  5 mg Oral BID With Meals Umesh Kendrick DO      senna-docusate sodium  1 tablet Oral Daily Jersey Pretty DO      triamcinolone   Topical BID Jersey Pretty DO      vancomycin  10 mg/kg (Adjusted) Intravenous Q24H Bhavna Cunningham  mg (06/16/24 0845)    zinc sulfate  220 mg Oral Daily Jersey Pretty DO          Today, Patient Was Seen By: Umesh Kendrick DO    **Please Note: This note may have been constructed using a voice recognition system.**

## 2024-06-16 NOTE — PROGRESS NOTES
Progress Note - Infectious Disease   Bhavna Al 73 y.o. female MRN: [id number]  Unit/Bed#: [facility] 820-01 Encounter: [id number]      Impression/Plan:  1.  MRSA bacteremia.  With MRSA and coagulase-negative Staphylococcus isolated in 1 out of 2 blood cultures.  While this could be a contaminant with only 1 out of 2 sets positive, in the setting of clinical sepsis and significant cutaneous breakdown, more likely a true bacteremia.  Fortunately the patient remains hemodynamically stable.  Repeat blood cultures are negative.  Transthoracic echocardiogram limited but without vegetation seen  -Continue intravenous vancomycin through 7/22/2024 to complete 6 weeks of treatment  -Pharmacy follow-up for vancomycin dose management  -Place PICC line     2.  T9-10 discitis/vertebral osteomyelitis.  Without epidural collection seen.  Neurologically stable.  Sedimentation rate is 41.  No increased pain.  -Antibiotics as above  -Neurosurgical follow-up  -Plan intravenous vancomycin through 7/22/2024 to complete 6 weeks of IV antibiotics  -Check CBC with differential, BMP, sedimentation rate and CRP weekly while on the IV antibiotics  -Anticipate transition to oral antibiotics, likely doxycycline after completion of the IV antibiotics until sedimentation rate has normalized or stabilized.  -Messaged office about need for office and laboratory follow-up     3.  Systemic inflammatory response syndrome.  With fever and tachycardia.  Suspect secondary to the gram-positive bacteremia.  Consideration for the possibly of other etiologies such as adrenal insufficiency.  Much improved.  -Antibiotics as above  -Steroids as per the primary service  -Recheck CBC with differential and BMP to make sure no developing toxicities  -Supportive care     4.  Rash.  With baseline psoriasis by report. Concern for the possibility of staph scalded skin syndrome as the patient has had before.  Biopsy cultures growing MRSA.  Pathology consistent with pustular  psoriasis.  -Topical treatment as per dermatology  -Dermatology follow-up     5.  Acute encephalopathy.  Patient quite lethargic over the last 3 to 4 days.  Suspect all related to the patient's primary process as above.  Remains hemodynamically stable.  Now seems to be back to baseline although some waxing and waning  -Monitor cognition  -No additional ID workup for now     5.  Rheumatoid arthritis.  On hydroxychloroquine and prednisone.  Risk factor for relative adrenal insufficiency.  -Weaning off stress dose steroids as per the primary service     6.  Acute kidney injury.  Suspect prerenal issues playing a significant role in the setting of his systemic illness.  Improved.  But now with worsening hyponatremia  -Dose adjusted antibiotics  -Volume management  -Recheck BMP    Discussed with the primary service the plan to continue the vancomycin for now and they agree with the plan    Antibiotics:  Vancomycin 9  Negative blood culture 6    Subjective:  Patient has no fever, chills, sweats; no nausea, vomiting, diarrhea; no cough, shortness of breath; no pain. No new symptoms.  Seems more somnolent is a bit tremulous.    Objective:  Vitals:  Temp:  [98.4 °F (36.9 °C)-99.5 °F (37.5 °C)] 99.5 °F (37.5 °C)  HR:  [] 93  Resp:  [17-18] 17  BP: (104-133)/(66-97) 126/73  SpO2:  [85 %-100 %] 93 %  Temp (24hrs), Av.9 °F (37.2 °C), Min:98.4 °F (36.9 °C), Max:99.5 °F (37.5 °C)  Current: Temperature: 99.5 °F (37.5 °C)    Physical Exam:   General Appearance:  Somnolent, arousable, nontoxic, no acute distress.   Throat: Oropharynx moist without lesions.    Lungs:   Clear to auscultation bilaterally; no wheezes, rhonchi or rales; respirations unlabored   Heart:  RRR; no murmur, rub or gallop   Abdomen:   Soft, non-tender, non-distended, positive bowel sounds.     Extremities: No clubbing, cyanosis or edema   Skin: No new rashes or lesions. No draining wounds noted.       Labs, Imaging, & Other studies:   All pertinent  labs and imaging studies were personally reviewed  Results from last 7 days   Lab Units 06/15/24  0601 06/11/24  0540   WBC Thousand/uL 5.36 4.88   HEMOGLOBIN g/dL 10.8* 10.7*   PLATELETS Thousands/uL 163 161     Results from last 7 days   Lab Units 06/16/24  0524 06/15/24  0601 06/12/24  0449   SODIUM mmol/L 151* 149* 146   POTASSIUM mmol/L 3.6 3.2* 3.8   CHLORIDE mmol/L 115* 116* 111*   CO2 mmol/L 27 28 27   BUN mg/dL 19 27* 32*   CREATININE mg/dL 1.02 1.16 1.10   EGFR ml/min/1.73sq m 53 46 49   CALCIUM mg/dL 8.7 8.9 9.0     Results from last 7 days   Lab Units 06/09/24  1553   BLOOD CULTURE  No Growth After 5 Days.         Results from last 7 days   Lab Units 06/11/24  0540   CRP mg/L 38.6*

## 2024-06-16 NOTE — PLAN OF CARE
Problem: PAIN - ADULT  Goal: Verbalizes/displays adequate comfort level or baseline comfort level  Description: Interventions:  - Encourage patient to monitor pain and request assistance  - Assess pain using appropriate pain scale  - Administer analgesics based on type and severity of pain and evaluate response  - Implement non-pharmacological measures as appropriate and evaluate response  - Consider cultural and social influences on pain and pain management  - Notify physician/advanced practitioner if interventions unsuccessful or patient reports new pain  Outcome: Progressing     Problem: INFECTION - ADULT  Goal: Absence or prevention of progression during hospitalization  Description: INTERVENTIONS:  - Assess and monitor for signs and symptoms of infection  - Monitor lab/diagnostic results  - Monitor all insertion sites, i.e. indwelling lines, tubes, and drains  - Monitor endotracheal if appropriate and nasal secretions for changes in amount and color  - [place] appropriate cooling/warming therapies per order  - Administer medications as ordered  - Instruct and encourage patient and family to use good hand hygiene technique  - Identify and instruct in appropriate isolation precautions for identified infection/condition  Outcome: Progressing  Goal: Absence of fever/infection during neutropenic period  Description: INTERVENTIONS:  - Monitor WBC    Outcome: Progressing

## 2024-06-17 ENCOUNTER — APPOINTMENT (INPATIENT)
Dept: RADIOLOGY | Facility: HOSPITAL | Age: 74
DRG: 539 | End: 2024-06-17
Attending: INTERNAL MEDICINE
Payer: MEDICARE

## 2024-06-17 LAB
ALBUMIN SERPL BCP-MCNC: 2.2 G/DL (ref 3.5–5)
ALP SERPL-CCNC: 39 U/L (ref 34–104)
ALT SERPL W P-5'-P-CCNC: 28 U/L (ref 7–52)
AMMONIA PLAS-SCNC: 20 UMOL/L (ref 18–72)
AMMONIA PLAS-SCNC: 210 UMOL/L (ref 18–72)
ANION GAP SERPL CALCULATED.3IONS-SCNC: 6 MMOL/L (ref 4–13)
AST SERPL W P-5'-P-CCNC: 29 U/L (ref 13–39)
BASE EX.OXY STD BLDV CALC-SCNC: 73.6 % (ref 60–80)
BASE EXCESS BLDV CALC-SCNC: -0.9 MMOL/L
BASOPHILS # BLD AUTO: 0 THOUSANDS/ÂΜL (ref 0–0.1)
BASOPHILS NFR BLD AUTO: 0 % (ref 0–1)
BILIRUB DIRECT SERPL-MCNC: 0.05 MG/DL (ref 0–0.2)
BILIRUB SERPL-MCNC: 0.31 MG/DL (ref 0.2–1)
BUN SERPL-MCNC: 17 MG/DL (ref 5–25)
CALCIUM SERPL-MCNC: 7.6 MG/DL (ref 8.4–10.2)
CHLORIDE SERPL-SCNC: 106 MMOL/L (ref 96–108)
CO2 SERPL-SCNC: 24 MMOL/L (ref 21–32)
CREAT SERPL-MCNC: 1.02 MG/DL (ref 0.6–1.3)
EOSINOPHIL # BLD AUTO: 0.07 THOUSAND/ÂΜL (ref 0–0.61)
EOSINOPHIL NFR BLD AUTO: 2 % (ref 0–6)
ERYTHROCYTE [DISTWIDTH] IN BLOOD BY AUTOMATED COUNT: 15.2 % (ref 11.6–15.1)
GFR SERPL CREATININE-BSD FRML MDRD: 53 ML/MIN/1.73SQ M
GLUCOSE SERPL-MCNC: 242 MG/DL (ref 65–140)
HCO3 BLDV-SCNC: 23.7 MMOL/L (ref 24–30)
HCT VFR BLD AUTO: 32.5 % (ref 34.8–46.1)
HGB BLD-MCNC: 9.8 G/DL (ref 11.5–15.4)
IMM GRANULOCYTES # BLD AUTO: 0.02 THOUSAND/UL (ref 0–0.2)
IMM GRANULOCYTES NFR BLD AUTO: 1 % (ref 0–2)
LACTATE SERPL-SCNC: 1.1 MMOL/L (ref 0.5–2)
LYMPHOCYTES # BLD AUTO: 0.28 THOUSANDS/ÂΜL (ref 0.6–4.47)
LYMPHOCYTES NFR BLD AUTO: 7 % (ref 14–44)
MAGNESIUM SERPL-MCNC: 1.7 MG/DL (ref 1.9–2.7)
MCH RBC QN AUTO: 27.8 PG (ref 26.8–34.3)
MCHC RBC AUTO-ENTMCNC: 30.2 G/DL (ref 31.4–37.4)
MCV RBC AUTO: 92 FL (ref 82–98)
MONOCYTES # BLD AUTO: 0.3 THOUSAND/ÂΜL (ref 0.17–1.22)
MONOCYTES NFR BLD AUTO: 8 % (ref 4–12)
NEUTROPHILS # BLD AUTO: 3.27 THOUSANDS/ÂΜL (ref 1.85–7.62)
NEUTS SEG NFR BLD AUTO: 82 % (ref 43–75)
NRBC BLD AUTO-RTO: 0 /100 WBCS
O2 CT BLDV-SCNC: 11.6 ML/DL
PCO2 BLDV: 39 MM HG (ref 42–50)
PH BLDV: 7.4 [PH] (ref 7.3–7.4)
PLATELET # BLD AUTO: 118 THOUSANDS/UL (ref 149–390)
PMV BLD AUTO: 12.1 FL (ref 8.9–12.7)
PO2 BLDV: 38.9 MM HG (ref 35–45)
POTASSIUM SERPL-SCNC: 3.5 MMOL/L (ref 3.5–5.3)
PROT SERPL-MCNC: 4.6 G/DL (ref 6.4–8.4)
RBC # BLD AUTO: 3.52 MILLION/UL (ref 3.81–5.12)
SODIUM SERPL-SCNC: 136 MMOL/L (ref 135–147)
TSH SERPL DL<=0.05 MIU/L-ACNC: 2.73 UIU/ML (ref 0.45–4.5)
WBC # BLD AUTO: 3.94 THOUSAND/UL (ref 4.31–10.16)

## 2024-06-17 PROCEDURE — 84443 ASSAY THYROID STIM HORMONE: CPT | Performed by: INTERNAL MEDICINE

## 2024-06-17 PROCEDURE — 80076 HEPATIC FUNCTION PANEL: CPT | Performed by: PHYSICIAN ASSISTANT

## 2024-06-17 PROCEDURE — 85025 COMPLETE CBC W/AUTO DIFF WBC: CPT | Performed by: INTERNAL MEDICINE

## 2024-06-17 PROCEDURE — 83735 ASSAY OF MAGNESIUM: CPT | Performed by: INTERNAL MEDICINE

## 2024-06-17 PROCEDURE — 99233 SBSQ HOSP IP/OBS HIGH 50: CPT | Performed by: INTERNAL MEDICINE

## 2024-06-17 PROCEDURE — NC001 PR NO CHARGE: Performed by: STUDENT IN AN ORGANIZED HEALTH CARE EDUCATION/TRAINING PROGRAM

## 2024-06-17 PROCEDURE — C1751 CATH, INF, PER/CENT/MIDLINE: HCPCS

## 2024-06-17 PROCEDURE — 83605 ASSAY OF LACTIC ACID: CPT | Performed by: PHYSICIAN ASSISTANT

## 2024-06-17 PROCEDURE — 80048 BASIC METABOLIC PNL TOTAL CA: CPT | Performed by: INTERNAL MEDICINE

## 2024-06-17 PROCEDURE — 82140 ASSAY OF AMMONIA: CPT | Performed by: INTERNAL MEDICINE

## 2024-06-17 PROCEDURE — 77001 FLUOROGUIDE FOR VEIN DEVICE: CPT

## 2024-06-17 PROCEDURE — 82805 BLOOD GASES W/O2 SATURATION: CPT | Performed by: PHYSICIAN ASSISTANT

## 2024-06-17 PROCEDURE — 02HV33Z INSERTION OF INFUSION DEVICE INTO SUPERIOR VENA CAVA, PERCUTANEOUS APPROACH: ICD-10-PCS | Performed by: INTERNAL MEDICINE

## 2024-06-17 PROCEDURE — 82140 ASSAY OF AMMONIA: CPT | Performed by: PHYSICIAN ASSISTANT

## 2024-06-17 PROCEDURE — 36573 INSJ PICC RS&I 5 YR+: CPT

## 2024-06-17 PROCEDURE — 76937 US GUIDE VASCULAR ACCESS: CPT

## 2024-06-17 RX ORDER — LACTULOSE 10 G/15ML
20 SOLUTION ORAL 3 TIMES DAILY
Status: DISCONTINUED | OUTPATIENT
Start: 2024-06-17 | End: 2024-06-18 | Stop reason: HOSPADM

## 2024-06-17 RX ORDER — MAGNESIUM SULFATE HEPTAHYDRATE 40 MG/ML
2 INJECTION, SOLUTION INTRAVENOUS ONCE
Status: COMPLETED | OUTPATIENT
Start: 2024-06-17 | End: 2024-06-18

## 2024-06-17 RX ADMIN — LIDOCAINE 5% 1 PATCH: 700 PATCH TOPICAL at 09:41

## 2024-06-17 RX ADMIN — VANCOMYCIN HYDROCHLORIDE 750 MG: 750 INJECTION, SOLUTION INTRAVENOUS at 09:41

## 2024-06-17 RX ADMIN — TRIAMCINOLONE ACETONIDE: 1 CREAM TOPICAL at 18:04

## 2024-06-17 RX ADMIN — LACTULOSE 20 G: 20 SOLUTION ORAL at 18:03

## 2024-06-17 RX ADMIN — APIXABAN 5 MG: 5 TABLET, FILM COATED ORAL at 18:03

## 2024-06-17 RX ADMIN — HYDROXYCHLOROQUINE SULFATE 200 MG: 200 TABLET ORAL at 09:42

## 2024-06-17 RX ADMIN — PREDNISONE 5 MG: 5 TABLET ORAL at 09:42

## 2024-06-17 RX ADMIN — MAGNESIUM SULFATE HEPTAHYDRATE 2 G: 40 INJECTION, SOLUTION INTRAVENOUS at 09:41

## 2024-06-17 RX ADMIN — DEXTROSE 100 ML/HR: 5 SOLUTION INTRAVENOUS at 03:36

## 2024-06-17 RX ADMIN — METOPROLOL TARTRATE 25 MG: 25 TABLET, FILM COATED ORAL at 22:01

## 2024-06-17 RX ADMIN — ZINC SULFATE 220 MG (50 MG) CAPSULE 220 MG: CAPSULE at 09:42

## 2024-06-17 RX ADMIN — ACETAMINOPHEN 975 MG: 325 TABLET, FILM COATED ORAL at 05:17

## 2024-06-17 RX ADMIN — PREDNISONE 5 MG: 5 TABLET ORAL at 18:03

## 2024-06-17 RX ADMIN — Medication 1000 UNITS: at 09:41

## 2024-06-17 RX ADMIN — LACTULOSE 20 G: 20 SOLUTION ORAL at 09:42

## 2024-06-17 RX ADMIN — NYSTATIN: 100000 POWDER TOPICAL at 09:45

## 2024-06-17 RX ADMIN — MICONAZOLE NITRATE: 20 CREAM TOPICAL at 18:04

## 2024-06-17 RX ADMIN — METOPROLOL TARTRATE 25 MG: 25 TABLET, FILM COATED ORAL at 09:42

## 2024-06-17 RX ADMIN — HYDROXYCHLOROQUINE SULFATE 200 MG: 200 TABLET ORAL at 18:05

## 2024-06-17 RX ADMIN — NYSTATIN: 100000 POWDER TOPICAL at 18:08

## 2024-06-17 RX ADMIN — DAPTOMYCIN 600 MG: 500 INJECTION, POWDER, LYOPHILIZED, FOR SOLUTION INTRAVENOUS at 17:57

## 2024-06-17 RX ADMIN — CLOBETASOL PROPIONATE: 0.5 CREAM TOPICAL at 18:05

## 2024-06-17 RX ADMIN — APIXABAN 5 MG: 5 TABLET, FILM COATED ORAL at 09:42

## 2024-06-17 RX ADMIN — PANTOPRAZOLE SODIUM 40 MG: 40 TABLET, DELAYED RELEASE ORAL at 05:17

## 2024-06-17 NOTE — QUICK NOTE
Ammonia 210, uncertain etiology at this time, no hx of cirrhosis. Prior CT A/P shows hepatic steatosis and hepatomegaly. Will start lactulose 20g TID and titrate to achieve 2-3 BM daily. Repeat ammonia now and again in AM. Will also add hepatic function panel, lactic acid, vbg.

## 2024-06-17 NOTE — PROGRESS NOTES
Vancomycin Monitoring    Demographics    Bhavna P Al    Assessment    Vancomycin has been d/c'd and therapy switched to dapto   Plan    Consult closed, level cancelled.     Nilay Jimenes, PharmD

## 2024-06-17 NOTE — PROGRESS NOTES
Bhavna Al is a 73 y.o. female who is currently ordered Vancomycin IV with management by the Pharmacy Consult service.  Relevant clinical data and objective / subjective history reviewed.  Vancomycin Assessment:  Indication and Goal AUC/Trough: Soft tissue (goal -600, trough >10), -600, trough >10  Clinical Status: stable  Micro: no new results    Renal Function:  SCr: 1.02 mg/dL  CrCl: 55.4 mL/min  Renal replacement: Not on dialysis  Days of Therapy: 12  Current Dose: 750mg IV q24h  Vancomycin Plan:  New Dosing: continue current dosing  Estimated AUC: 481 mcg*hr/mL  Estimated Trough: 15.5 mcg/mL  Next Level: 06/18/24 am labs  Renal Function Monitoring: Daily BMP and UOP  Pharmacy will continue to follow closely for s/sx of nephrotoxicity, infusion reactions and appropriateness of therapy.  BMP and CBC will be ordered per protocol. We will continue to follow the patient’s culture results and clinical progress daily.    Michael Pimentel, Pharmacist

## 2024-06-17 NOTE — NURSING NOTE
VAS team attempt venous access for PICC, unable to access vein due to depth of veins, IR consulted

## 2024-06-17 NOTE — PROGRESS NOTES
Progress Note - Infectious Disease   Bhavna Al 73 y.o. female MRN: [id number]  Unit/Bed#: [facility] 820-01 Encounter: [id number]      Impression/Plan:  MRSA bacteremia: 1/2 blood cultures from admission grew MRSA and coagulase negative staph  Continue vancomycin, monitor levels, continue through  to complete 6 weeks of treatment  TTE negative, technically limited study  Following up repeat blood cultures  PICC line ordered and pending placement  T9/T10 discitis, vertebral osteomyelitis, without epidural collection seen  Neurosurgery consulted - conservative management  Defer bracing in the setting of chronic skin changes   SIRS: with fever and tachycardia, in the setting of MRSA bacteremia  Antibiotics as above  On steroid taper per primary  Trend CBC with differential and BMP to make sure not developing toxicities  Supportive care  Acute encephalopathy  Monitor cognition  Antibiotics as above  Psoriasis   Dermatology following  Obtained punch biopsy which was significant for psoriasis  Rheumatoid arthritis  On home hydroxychloroquine, prednisone daily  Acute Kidney Injury: likely prerenal in the setting of decreased PO intake at the nursing home and the presence of atrial fibrillation with RVR  Trend BMP, dose adjusted antibiotics      Antibiotics:  Vancomycin 750mg IV daily    Subjective:  Patient has no fever, chills, sweats; no nausea, vomiting, diarrhea; no cough, shortness of breath; no pain. No new symptoms.    Objective:  Vitals:  Temp:  [97.4 °F (36.3 °C)-99.5 °F (37.5 °C)] 98.1 °F (36.7 °C)  HR:  [72-98] 72  Resp:  [18-21] 21  BP: (103-128)/(63-78) 123/77  SpO2:  [93 %-100 %] 99 %  Temp (24hrs), Av.7 °F (37.1 °C), Min:97.4 °F (36.3 °C), Max:99.5 °F (37.5 °C)  Current: Temperature: 98.1 °F (36.7 °C)    Physical Exam:   General Appearance:  Alert, interactive, nontoxic, no acute distress.   Throat: Oropharynx moist without lesions.    Lungs:   Clear to auscultation bilaterally; no wheezes, rhonchi or  rales; respirations unlabored   Heart:  RRR; no murmur, rub or gallop   Abdomen:   Soft, non-tender, non-distended, positive bowel sounds.     Extremities: No clubbing, cyanosis or edema   Skin: No new rashes or lesions. No draining wounds noted.       Labs, Imaging, & Other studies:   All pertinent labs and imaging studies were personally reviewed  Results from last 7 days   Lab Units 06/17/24  0319 06/15/24  0601 06/11/24  0540   WBC Thousand/uL 3.94* 5.36 4.88   HEMOGLOBIN g/dL 9.8* 10.8* 10.7*   PLATELETS Thousands/uL 118* 163 161     Results from last 7 days   Lab Units 06/17/24  0522 06/17/24  0345 06/16/24  0524 06/15/24  0601   SODIUM mmol/L  --  136 151* 149*   POTASSIUM mmol/L  --  3.5 3.6 3.2*   CHLORIDE mmol/L  --  106 115* 116*   CO2 mmol/L  --  24 27 28   BUN mg/dL  --  17 19 27*   CREATININE mg/dL  --  1.02 1.02 1.16   EGFR ml/min/1.73sq m  --  53 53 46   CALCIUM mg/dL  --  7.6* 8.7 8.9   AST U/L 29  --   --   --    ALT U/L 28  --   --   --    ALK PHOS U/L 39  --   --   --              Results from last 7 days   Lab Units 06/11/24  0540   CRP mg/L 38.6*

## 2024-06-17 NOTE — PROCEDURES
Insert Complex Venous Access Line    Date/Time: 6/17/2024 3:35 PM    Performed by: Kt Chery  Authorized by: Umesh Kendrick DO    Patient location:  IR  Other Assisting Provider: Yes (comment) (EB,RT)    Consent:     Consent obtained:  Verbal    Consent given by: daughter, consent obtained by provider.    Risks discussed:  Arterial puncture, bleeding, infection, incorrect placement, nerve damage and pneumothorax (discussed with provider)    Alternatives discussed:  No treatment (discussed with provider)  Universal protocol:     Procedure explained and questions answered to patient or proxy's satisfaction: yes      Immediately prior to procedure, a time out was called: yes      Relevant documents present and verified: yes      Test results available and properly labeled: yes      Radiology Images displayed and confirmed.  If images not available, report reviewed: yes      Required blood products, implants, devices, and special equipment available: yes      Site/side marked: yes      Patient identity confirmed:  Verbally with patient, arm band, hospital-assigned identification number and provided demographic data  Pre-procedure details:     Hand hygiene: Hand hygiene performed prior to insertion      Sterile barrier technique: All elements of maximal sterile technique followed      Skin preparation:  ChloraPrep    Skin preparation agent: Skin preparation agent completely dried prior to procedure    Procedure details:     Complex Venous Access Line Type: PICC      Complex Venous Access Line Indications: long term antibiotics      Catheter tip vessel location: atriocaval junction      Orientation:  Right    Location:  Basilic    Procedural supplies:  Single lumen    Catheter size:  4 Fr (LOT [id number], EXPIR 01/31/27)    Total catheter length (cm):  42    Catheter out on skin (cm):  0    Max flow rate:  999    Arm circumference:  36    Patient evaluated for contraindications to access (i.e. fistula, thrombosis,  etc): Yes      Site selection rationale:  Largest, most patent vessel    Approach: percutaneous technique used      Patient position:  Flat    Ultrasound image availability:  Images available in PACS    Sterile ultrasound techniques: Sterile gel and sterile probe covers were used      Number of attempts:  1    Successful placement: yes      Landmarks identified: yes      Cath access vessel: placed under fluoroscopy.  Anesthesia (see MAR for exact dosages):     Anesthesia method:  Local infiltration    Local anesthetic:  Lidocaine 1% w/o epi    Sedation type:  Other (comment) (none required, patient tolerated well)  Post-procedure details:     Post-procedure:  Securement device placed    Assessment:  Blood return through all ports and free fluid flow    Post-procedure complications: none      Patient tolerance of procedure:  Tolerated well, no immediate complications    Observer: Yes      Observer name:  RT Renan

## 2024-06-17 NOTE — ASSESSMENT & PLAN NOTE
Likely secondary due to poor oral intake  Resolved with hypotonic IV fluid  Monitor sodium level  DC IV fluid

## 2024-06-17 NOTE — PROGRESS NOTES
[facility]  Progress Note  Name: Bhavna Al I  MRN: [id number]  Unit/Bed#: [facility] 820-01 I Date of Admission: 6/6/2024   Date of Service: 6/17/2024 I Hospital Day: 11    Assessment & Plan   * MRSA bacteremia  Assessment & Plan  Presented on 6/6/24 with generalized weakness, requiring increased assistance with ADL's, back pain and poor oral intake  Had a similar presentation in 1/2024 due to SSSS with septic shock and gram positive bacteremia requiring prolonged IV antibiotics  Was initially begun on Cefepime and Vancomycin  1 of 2 blood cultures positive for MRSA and coagulase-negative staphylococcus  Cefepime discontinued on 6/7/24 and Vancomycin continued  MRI thoracic spine (6/9/24) - Findings suspicious for discitis/osteomyelitis at T9-T10. No epidural collection.  Wound cultures - staph aureus  TTE - no vegetations  Linezolid added on 6/10/24   Repeat blood culture from 6/10/2024 negativ  Monitor temperature, WBC, follow-up repeat blood cultures  ID is following, with plan for 6 weeks of IV antibiotic through 7/22/2024  PICC line placement was ordered  Patient's daughter verbally consented for the  PICC line    Acute osteomyelitis of thoracic spine (HCC)  Assessment & Plan  MRI with T9-T10 discitis/vertebral osteomyelitis without epidural collection  Per neurosurgery - defer bracing in the setting of extensive skin changes  Antibiotics as above    Hypernatremia  Assessment & Plan  Likely secondary due to poor oral intake  Resolved with hypotonic IV fluid  Monitor sodium level  DC IV fluid    Acute metabolic encephalopathy  Assessment & Plan  Lethargy on presentation in the setting of above  Continue supportive care   Plan as above    Psoriasis  Assessment & Plan  Has multiple wounds  Recent diagnosis of SSSS  S/p punch biopsy by dermatology  Wound cultures - staph aureus  Continue with antibiotic treatment and local care      Acute kidney injury superimposed on CKD   (Formerly [facility])  Assessment & Plan  Lab Results   Component Value Date    EGFR 53 06/17/2024    EGFR 53 06/16/2024    EGFR 46 06/15/2024    CREATININE 1.02 06/17/2024    CREATININE 1.02 06/16/2024    CREATININE 1.16 06/15/2024     CKD 3 at baseline with baseline Cr 0.8-1.0  AMANDA on admission with creatinine 1.75 and worsened to 1.85  Likely prerenal azotemia due to reduced p.o. intake for several days prior to admission  Creatinine improving   Continue to hold diuretics given current hypernatremia    SIRS (systemic inflammatory response syndrome) ([facility])  Assessment & Plan  With fever and tachycardia  Likely due to MRSA bacteremia  Plan as above    Pressure ulcer of left buttock, stage 3 ([facility])  Assessment & Plan  POA  Wound care    Secondary adrenal insufficiency ([facility])  Assessment & Plan  Possible secondary adrenal insufficiency in the setting of use of chronic prednisone  Hydrocortisone being tapered as above  Continue home dose prednisone    Dysphagia  Assessment & Plan  Diet advanced to dental soft per speech recommendation    Hypotension  Assessment & Plan  Blood pressure is improving  Monitor    Chronic diastolic heart failure ([facility])  Assessment & Plan  Wt Readings from Last 3 Encounters:   06/16/24 108 kg (238 lb 1.6 oz)   06/08/24 103 kg (228 lb)   02/19/24 113 kg (249 lb)     Last EF 65% in 1/2024 although poor windows; prior TTE in 11/2022 with EF 55%  Home Lasix held as hypovolemic/AMANDA  Resume as able  Monitor volume status      Paroxysmal atrial fibrillation ([facility])  Assessment & Plan  Maintained at home with metoprolol tartrate 25mg BID  Continue Eliquis    Essential hypertension  Assessment & Plan  home Lopressor 25 mg twice daily  Continue current dose of Lopressor    GERD (gastroesophageal reflux disease)  Assessment & Plan  Continue home PPI    Rheumatoid arthritis ([facility])  Assessment & Plan  Follows with rheumatology outpatient  Continue home hydroxychloroquine 200 mg twice daily   Continue home dose prednisone                VTE Pharmacologic Prophylaxis: VTE Score: 7 High Risk (Score >/= 5) - Pharmacological DVT Prophylaxis Ordered: apixaban (Eliquis). Sequential Compression Devices Ordered.    Mobility:   Basic Mobility Inpatient Raw Score: 7  JH-HLM Goal: 2: Bed activities/Dependent transfer  JH-HLM Achieved: 2: Bed activities/Dependent transfer  JH-HLM Goal achieved. Continue to encourage appropriate mobility.    Patient Centered Rounds: I performed bedside rounds with nursing staff today.   Discussions with Specialists or Other Care Team Provider: CEDRIC HARRELL    Education and Discussions with Family / Patient: Updated  (daughter) via phone.  And signed the consent for PICC line    Total Time Spent on Date of Encounter in care of patient: 40 mins. This time was spent on one or more of the following: performing physical exam; counseling and coordination of care; obtaining or reviewing history; documenting in the medical record; reviewing/ordering tests, medications or procedures; communicating with other healthcare professionals and discussing with patient's family/caregivers.    Current Length of Stay: 11 day(s)  Current Patient Status: Inpatient   Certification Statement: The patient will continue to require additional inpatient hospital stay due to PICC line placement  Discharge Plan: Anticipate discharge tomorrow to rehab facility.    Code Status: Level 1 - Full Code    Subjective:   Seen and examined  Comfortable in bed  No event overnight    PICC team unable to place a PICC line IR consulted for PICC line placement,     Objective:     Vitals:   Temp (24hrs), Av.2 °F (36.8 °C), Min:97.4 °F (36.3 °C), Max:99.2 °F (37.3 °C)    Temp:  [97.4 °F (36.3 °C)-99.2 °F (37.3 °C)] 98.1 °F (36.7 °C)  HR:  [72-94] 72  Resp:  [18-21] 21  BP: (103-128)/(63-78) 123/77  SpO2:  [98 %-99 %] 99 %  Body mass index is 42.18 kg/m².     Input and Output Summary (last 24 hours):     Intake/Output Summary (Last 24 hours) at 2024  1353  Last data filed at 6/16/2024 2101  Gross per 24 hour   Intake 1286.67 ml   Output --   Net 1286.67 ml       Physical Exam:   Physical Exam   Patient is comfortable in bed, responding to verbal stimuli  No acute distress  Skin with diffuse xerosis  Lung with decreased breath sound bilateral  Heart positive S1-S2 no murmur  Abdomen soft nontender  Lower extremities no edema    Additional Data:     Labs:  Results from last 7 days   Lab Units 06/17/24  0319   WBC Thousand/uL 3.94*   HEMOGLOBIN g/dL 9.8*   HEMATOCRIT % 32.5*   PLATELETS Thousands/uL 118*   SEGS PCT % 82*   LYMPHO PCT % 7*   MONO PCT % 8   EOS PCT % 2     Results from last 7 days   Lab Units 06/17/24  0522 06/17/24  0345   SODIUM mmol/L  --  136   POTASSIUM mmol/L  --  3.5   CHLORIDE mmol/L  --  106   CO2 mmol/L  --  24   BUN mg/dL  --  17   CREATININE mg/dL  --  1.02   ANION GAP mmol/L  --  6   CALCIUM mg/dL  --  7.6*   ALBUMIN g/dL 2.2*  --    TOTAL BILIRUBIN mg/dL 0.31  --    ALK PHOS U/L 39  --    ALT U/L 28  --    AST U/L 29  --    GLUCOSE RANDOM mg/dL  --  242*                 Results from last 7 days   Lab Units 06/17/24  0522   LACTIC ACID mmol/L 1.1       Lines/Drains:  Invasive Devices       Central Venous Catheter Line  Duration             CVC Central Lines 06/07/24 Double Right Internal jugular 9 days                    Central Line:  Goal for removal: Will discontinue when peripheral access obtained.              Imaging: No pertinent imaging reviewed.    Recent Cultures (last 7 days):         Last 24 Hours Medication List:   Current Facility-Administered Medications   Medication Dose Route Frequency Provider Last Rate    acetaminophen  975 mg Oral Q8H FAMILIA Kendrick, DO      albuterol  2 puff Inhalation Q6H PRN Jersey Pretty, DO      apixaban  5 mg Oral BID Cathy Ribeiro MD      cholecalciferol  1,000 Units Oral Daily Jersey Pretty, DO      clobetasol   Topical BID Jersey Pretty, DO       hydroxychloroquine  200 mg Oral BID Jersey Pretty, DO      lactulose  20 g Oral TID Chastity Guzman PA-C      lidocaine  1 patch Topical Daily Jersey Pretty, DO      metoprolol tartrate  25 mg Oral Q12H [facility] Umesh Kendrick, DO      miconazole   Topical BID Jersey Pretty, DO      nystatin   Topical BID Jersey Pretty, DO      pantoprazole  40 mg Oral Early Morning Umesh Kendrick, DO      polyethylene glycol  17 g Oral Daily PRN Jersey Pretty, DO      predniSONE  5 mg Oral BID With Meals Umesh Kendrick, DO      triamcinolone   Topical BID Jersey Pretty, DO      vancomycin  10 mg/kg (Adjusted) Intravenous Q24H Bhavna Aldea,  mg (06/17/24 0941)    zinc sulfate  220 mg Oral Daily Jersey Pretty DO          Today, Patient Was Seen By: Umesh Kendrick DO    **Please Note: This note may have been constructed using a voice recognition system.**

## 2024-06-17 NOTE — PLAN OF CARE
Problem: PAIN - ADULT  Goal: Verbalizes/displays adequate comfort level or baseline comfort level  Description: Interventions:  - Encourage patient to monitor pain and request assistance  - Assess pain using appropriate pain scale  - Administer analgesics based on type and severity of pain and evaluate response  - Implement non-pharmacological measures as appropriate and evaluate response  - Consider cultural and social influences on pain and pain management  - Notify physician/advanced practitioner if interventions unsuccessful or patient reports new pain  Outcome: Progressing     Problem: INFECTION - ADULT  Goal: Absence or prevention of progression during hospitalization  Description: INTERVENTIONS:  - Assess and monitor for signs and symptoms of infection  - Monitor lab/diagnostic results  - Monitor all insertion sites, i.e. indwelling lines, tubes, and drains  - Monitor endotracheal if appropriate and nasal secretions for changes in amount and color  - [place] appropriate cooling/warming therapies per order  - Administer medications as ordered  - Instruct and encourage patient and family to use good hand hygiene technique  - Identify and instruct in appropriate isolation precautions for identified infection/condition  Outcome: Progressing  Goal: Absence of fever/infection during neutropenic period  Description: INTERVENTIONS:  - Monitor WBC    Outcome: Progressing     Problem: SAFETY ADULT  Goal: Patient will remain free of falls  Description: INTERVENTIONS:  - Educate patient/family on patient safety including physical limitations  - Instruct patient to call for assistance with activity   - Consult OT/PT to assist with strengthening/mobility   - Keep Call bell within reach  - Keep bed low and locked with side rails adjusted as appropriate  - Keep care items and personal belongings within reach  - Initiate and maintain comfort rounds  - Make Fall Risk Sign visible to staff  - Offer Toileting every 2 Hours,  in advance of need  - Initiate/Maintain bed alarm  - Apply yellow socks and bracelet for high fall risk patients  - Consider moving patient to room near nurses station  Outcome: Progressing  Goal: Maintain or return to baseline ADL function  Description: INTERVENTIONS:  -  Assess patient's ability to carry out ADLs; assess patient's baseline for ADL function and identify physical deficits which impact ability to perform ADLs (bathing, care of mouth/teeth, toileting, grooming, dressing, etc.)  - Assess/evaluate cause of self-care deficits   - Assess range of motion  - Assess patient's mobility; develop plan if impaired  - Assess patient's need for assistive devices and provide as appropriate  - Encourage maximum independence but intervene and supervise when necessary  - Involve family in performance of ADLs  - Assess for home care needs following discharge   - Consider OT consult to assist with ADL evaluation and planning for discharge  - Provide patient education as appropriate  Outcome: Progressing  Goal: Maintains/Returns to pre admission functional level  Description: INTERVENTIONS:  - Perform AM-PAC 6 Click Basic Mobility/ Daily Activity assessment daily.  - Set and communicate daily mobility goal to care team and patient/family/caregiver.   - Collaborate with rehabilitation services on mobility goals if consulted  - Perform Range of Motion 3 times a day.  - Reposition patient every 2 hours.  - Out of bed for toileting  - Record patient progress and toleration of activity level   Outcome: Progressing     Problem: DISCHARGE PLANNING  Goal: Discharge to home or other facility with appropriate resources  Description: INTERVENTIONS:  - Identify barriers to discharge w/patient and caregiver  - Arrange for needed discharge resources and transportation as appropriate  - Identify discharge learning needs (meds, wound care, etc.)  - Arrange for interpretive services to assist at discharge as needed  - Refer to Case  Management Department for coordinating discharge planning if the patient needs post-hospital services based on physician/advanced practitioner order or complex needs related to functional status, cognitive ability, or social support system  Outcome: Progressing     Problem: Knowledge Deficit  Goal: Patient/family/caregiver demonstrates understanding of disease process, treatment plan, medications, and discharge instructions  Description: Complete learning assessment and assess knowledge base.  Interventions:  - Provide teaching at level of understanding  - Provide teaching via preferred learning methods  Outcome: Progressing     Problem: Prexisting or High Potential for Compromised Skin Integrity  Goal: Skin integrity is maintained or improved  Description: INTERVENTIONS:  - Identify patients at risk for skin breakdown  - Assess and monitor skin integrity  - Assess and monitor nutrition and hydration status  - Monitor labs   - Assess for incontinence   - Turn and reposition patient  - Assist with mobility/ambulation  - Relieve pressure over bony prominences  - Avoid friction and shearing  - Provide appropriate hygiene as needed including keeping skin clean and dry  - Evaluate need for skin moisturizer/barrier cream  - Collaborate with interdisciplinary team   - Patient/family teaching  - Consider wound care consult   Outcome: Progressing     Problem: Nutrition/Hydration-ADULT  Goal: Nutrient/Hydration intake appropriate for improving, restoring or maintaining nutritional needs  Description: Monitor and assess patient's nutrition/hydration status for malnutrition. Collaborate with interdisciplinary team and initiate plan and interventions as ordered.  Monitor patient's weight and dietary intake as ordered or per policy. Utilize nutrition screening tool and intervene as necessary. Determine patient's food preferences and provide high-protein, high-caloric foods as appropriate.     INTERVENTIONS:  - Monitor oral  intake, urinary output, labs, and treatment plans  - Assess nutrition and hydration status and recommend course of action  - Evaluate amount of meals eaten  - Assist patient with eating if necessary   - Allow adequate time for meals  - Recommend/ encourage appropriate diets, oral nutritional supplements, and vitamin/mineral supplements  - Order, calculate, and assess calorie counts as needed  - Recommend, monitor, and adjust tube feedings and TPN/PPN based on assessed needs  - Assess need for intravenous fluids  - Provide specific nutrition/hydration education as appropriate  - Include patient/family/caregiver in decisions related to nutrition  Outcome: Progressing

## 2024-06-17 NOTE — ASSESSMENT & PLAN NOTE
Lab Results   Component Value Date    EGFR 53 06/17/2024    EGFR 53 06/16/2024    EGFR 46 06/15/2024    CREATININE 1.02 06/17/2024    CREATININE 1.02 06/16/2024    CREATININE 1.16 06/15/2024     CKD 3 at baseline with baseline Cr 0.8-1.0  AMANDA on admission with creatinine 1.75 and worsened to 1.85  Likely prerenal azotemia due to reduced p.o. intake for several days prior to admission  Creatinine improving   Continue to hold diuretics given current hypernatremia

## 2024-06-17 NOTE — ASSESSMENT & PLAN NOTE
Presented on 6/6/24 with generalized weakness, requiring increased assistance with ADL's, back pain and poor oral intake  Had a similar presentation in 1/2024 due to SSSS with septic shock and gram positive bacteremia requiring prolonged IV antibiotics  Was initially begun on Cefepime and Vancomycin  1 of 2 blood cultures positive for MRSA and coagulase-negative staphylococcus  Cefepime discontinued on 6/7/24 and Vancomycin continued  MRI thoracic spine (6/9/24) - Findings suspicious for discitis/osteomyelitis at T9-T10. No epidural collection.  Wound cultures - staph aureus  TTE - no vegetations  Linezolid added on 6/10/24   Repeat blood culture from 6/10/2024 negativ  Monitor temperature, WBC, follow-up repeat blood cultures  ID is following, with plan for 6 weeks of IV antibiotic through 7/22/2024  PICC line placement was ordered  Patient's daughter verbally consented for the  PICC line

## 2024-06-18 VITALS
HEART RATE: 77 BPM | OXYGEN SATURATION: 99 % | WEIGHT: 238.54 LBS | TEMPERATURE: 97.7 F | BODY MASS INDEX: 42.27 KG/M2 | HEIGHT: 63 IN | DIASTOLIC BLOOD PRESSURE: 74 MMHG | RESPIRATION RATE: 16 BRPM | SYSTOLIC BLOOD PRESSURE: 124 MMHG

## 2024-06-18 LAB
AMMONIA PLAS-SCNC: 16 UMOL/L (ref 18–72)
ANION GAP SERPL CALCULATED.3IONS-SCNC: 6 MMOL/L (ref 4–13)
BACTERIA BLD CULT: ABNORMAL
BACTERIA BLD CULT: ABNORMAL
BASOPHILS # BLD AUTO: 0 THOUSANDS/ÂΜL (ref 0–0.1)
BASOPHILS NFR BLD AUTO: 0 % (ref 0–1)
BUN SERPL-MCNC: 15 MG/DL (ref 5–25)
CALCIUM SERPL-MCNC: 7.8 MG/DL (ref 8.4–10.2)
CHLORIDE SERPL-SCNC: 106 MMOL/L (ref 96–108)
CK SERPL-CCNC: 48 U/L (ref 26–192)
CO2 SERPL-SCNC: 27 MMOL/L (ref 21–32)
CREAT SERPL-MCNC: 1.24 MG/DL (ref 0.6–1.3)
EOSINOPHIL # BLD AUTO: 0.09 THOUSAND/ÂΜL (ref 0–0.61)
EOSINOPHIL NFR BLD AUTO: 2 % (ref 0–6)
ERYTHROCYTE [DISTWIDTH] IN BLOOD BY AUTOMATED COUNT: 15 % (ref 11.6–15.1)
GFR SERPL CREATININE-BSD FRML MDRD: 42 ML/MIN/1.73SQ M
GLUCOSE SERPL-MCNC: 62 MG/DL (ref 65–140)
GRAM STN SPEC: ABNORMAL
HCT VFR BLD AUTO: 34.1 % (ref 34.8–46.1)
HGB BLD-MCNC: 10.6 G/DL (ref 11.5–15.4)
IMM GRANULOCYTES # BLD AUTO: 0.04 THOUSAND/UL (ref 0–0.2)
IMM GRANULOCYTES NFR BLD AUTO: 1 % (ref 0–2)
LYMPHOCYTES # BLD AUTO: 0.63 THOUSANDS/ÂΜL (ref 0.6–4.47)
LYMPHOCYTES NFR BLD AUTO: 13 % (ref 14–44)
MCH RBC QN AUTO: 28 PG (ref 26.8–34.3)
MCHC RBC AUTO-ENTMCNC: 31.1 G/DL (ref 31.4–37.4)
MCV RBC AUTO: 90 FL (ref 82–98)
MONOCYTES # BLD AUTO: 0.43 THOUSAND/ÂΜL (ref 0.17–1.22)
MONOCYTES NFR BLD AUTO: 9 % (ref 4–12)
NEUTROPHILS # BLD AUTO: 3.64 THOUSANDS/ÂΜL (ref 1.85–7.62)
NEUTS SEG NFR BLD AUTO: 75 % (ref 43–75)
NRBC BLD AUTO-RTO: 0 /100 WBCS
PLATELET # BLD AUTO: 135 THOUSANDS/UL (ref 149–390)
PMV BLD AUTO: 12.4 FL (ref 8.9–12.7)
POTASSIUM SERPL-SCNC: 3.9 MMOL/L (ref 3.5–5.3)
RBC # BLD AUTO: 3.79 MILLION/UL (ref 3.81–5.12)
SODIUM SERPL-SCNC: 139 MMOL/L (ref 135–147)
WBC # BLD AUTO: 4.83 THOUSAND/UL (ref 4.31–10.16)

## 2024-06-18 PROCEDURE — 99233 SBSQ HOSP IP/OBS HIGH 50: CPT | Performed by: INTERNAL MEDICINE

## 2024-06-18 PROCEDURE — 82550 ASSAY OF CK (CPK): CPT | Performed by: INTERNAL MEDICINE

## 2024-06-18 PROCEDURE — 80048 BASIC METABOLIC PNL TOTAL CA: CPT | Performed by: INTERNAL MEDICINE

## 2024-06-18 PROCEDURE — 82140 ASSAY OF AMMONIA: CPT | Performed by: PHYSICIAN ASSISTANT

## 2024-06-18 PROCEDURE — 99239 HOSP IP/OBS DSCHRG MGMT >30: CPT | Performed by: INTERNAL MEDICINE

## 2024-06-18 PROCEDURE — 85025 COMPLETE CBC W/AUTO DIFF WBC: CPT | Performed by: INTERNAL MEDICINE

## 2024-06-18 RX ORDER — PANTOPRAZOLE SODIUM 40 MG/1
40 TABLET, DELAYED RELEASE ORAL
Status: ON HOLD
Start: 2024-06-19

## 2024-06-18 RX ORDER — LACTULOSE 10 G/15ML
20 SOLUTION ORAL 3 TIMES DAILY
Status: ON HOLD
Start: 2024-06-18

## 2024-06-18 RX ADMIN — LACTULOSE 20 G: 20 SOLUTION ORAL at 08:56

## 2024-06-18 RX ADMIN — PANTOPRAZOLE SODIUM 40 MG: 40 TABLET, DELAYED RELEASE ORAL at 06:02

## 2024-06-18 RX ADMIN — ACETAMINOPHEN 975 MG: 325 TABLET, FILM COATED ORAL at 06:02

## 2024-06-18 RX ADMIN — NYSTATIN: 100000 POWDER TOPICAL at 08:56

## 2024-06-18 RX ADMIN — HYDROXYCHLOROQUINE SULFATE 200 MG: 200 TABLET ORAL at 08:55

## 2024-06-18 RX ADMIN — MICONAZOLE NITRATE: 20 CREAM TOPICAL at 08:56

## 2024-06-18 RX ADMIN — Medication 1000 UNITS: at 08:55

## 2024-06-18 RX ADMIN — APIXABAN 5 MG: 5 TABLET, FILM COATED ORAL at 08:55

## 2024-06-18 RX ADMIN — CLOBETASOL PROPIONATE: 0.5 CREAM TOPICAL at 08:56

## 2024-06-18 RX ADMIN — LIDOCAINE 5% 1 PATCH: 700 PATCH TOPICAL at 08:53

## 2024-06-18 RX ADMIN — ZINC SULFATE 220 MG (50 MG) CAPSULE 220 MG: CAPSULE at 08:55

## 2024-06-18 RX ADMIN — TRIAMCINOLONE ACETONIDE: 1 CREAM TOPICAL at 08:56

## 2024-06-18 RX ADMIN — METOPROLOL TARTRATE 25 MG: 25 TABLET, FILM COATED ORAL at 08:55

## 2024-06-18 RX ADMIN — PREDNISONE 5 MG: 5 TABLET ORAL at 06:02

## 2024-06-18 NOTE — ASSESSMENT & PLAN NOTE
Wt Readings from Last 3 Encounters:   06/18/24 108 kg (238 lb 8.6 oz)   06/08/24 103 kg (228 lb)   02/19/24 113 kg (249 lb)     Last EF 65% in 1/2024 although poor windows; prior TTE in 11/2022 with EF 55%  Home Lasix held as hypovolemic/AMANDA  Resume as able  Monitor volume status

## 2024-06-18 NOTE — PROGRESS NOTES
Progress Note - Infectious Disease   Bhavna Al 73 y.o. female MRN: [id number]  Unit/Bed#: [facility] 820-01 Encounter: [id number]      Impression/Plan:  MRSA bacteremia: 1/2 blood cultures from admission grew MRSA and coagulase negative staph, repeat blood cultures are negative  Vancomycin discontinued due to decreased cell counts  Continue daptomycin 8mg/kg IV daily to be continued through 24  Baseline CPK normal, check weekly CPK  PICC line placed yesterday by IR  T9/T10 discitis, vertebral osteomyelitis, without epidural collection seen  Neurosurgery consulted - conservative management  Defer bracing in the setting of chronic skin changes   SIRS: with fever and tachycardia, in the setting of MRSA bacteremia  Antibiotics as above  On steroid taper per primary  Trend CBC with differential and BMP to make sure not developing toxicities  Supportive care  Acute encephalopathy, remains HDS, patient seems to be back to baseline  Monitor cognition  Antibiotics as above  Psoriasis   Dermatology following  Obtained punch biopsy which was significant for psoriasis  Topical treatment by dermatology  Rheumatoid arthritis  On home hydroxychloroquine, prednisone daily  Acute Kidney Injury: likely prerenal in the setting of decreased PO intake at the nursing home and the presence of atrial fibrillation with RVR  Trend BMP, dose adjusted antibiotics    Antibiotics:  Daptomycin 600mg IV daily     Subjective:  Patient has no fever, chills, sweats; no nausea, vomiting, diarrhea; no cough, shortness of breath; no pain. No new symptoms.    Objective:  Vitals:  Temp:  [97.7 °F (36.5 °C)-100.2 °F (37.9 °C)] 97.7 °F (36.5 °C)  HR:  [71-86] 77  Resp:  [16] 16  BP: (120-124)/(74) 124/74  SpO2:  [99 %] 99 %  Temp (24hrs), Av °F (37.2 °C), Min:97.7 °F (36.5 °C), Max:100.2 °F (37.9 °C)  Current: Temperature: 97.7 °F (36.5 °C)    Physical Exam:   General Appearance:  Alert, interactive, nontoxic, no acute distress.   Throat: Oropharynx  moist without lesions.    Lungs:   Clear to auscultation bilaterally; no wheezes, rhonchi or rales; respirations unlabored   Heart:  RRR; no murmur, rub or gallop   Abdomen:   Soft, non-tender, non-distended, positive bowel sounds.     Extremities: No clubbing, cyanosis or edema   Skin: Improved scaling rash. No new rashes or lesions. No draining wounds noted.       Labs, Imaging, & Other studies:   All pertinent labs and imaging studies were personally reviewed  Results from last 7 days   Lab Units 06/18/24  0619 06/17/24  0319 06/15/24  0601   WBC Thousand/uL 4.83 3.94* 5.36   HEMOGLOBIN g/dL 10.6* 9.8* 10.8*   PLATELETS Thousands/uL 135* 118* 163     Results from last 7 days   Lab Units 06/18/24  0619 06/17/24  0522 06/17/24  0345 06/16/24  0524   SODIUM mmol/L 139  --  136 151*   POTASSIUM mmol/L 3.9  --  3.5 3.6   CHLORIDE mmol/L 106  --  106 115*   CO2 mmol/L 27  --  24 27   BUN mg/dL 15  --  17 19   CREATININE mg/dL 1.24  --  1.02 1.02   EGFR ml/min/1.73sq m 42  --  53 53   CALCIUM mg/dL 7.8*  --  7.6* 8.7   AST U/L  --  29  --   --    ALT U/L  --  28  --   --    ALK PHOS U/L  --  39  --   --

## 2024-06-18 NOTE — ASSESSMENT & PLAN NOTE
Presented on 6/6/24 with generalized weakness, requiring increased assistance with ADL's, back pain and poor oral intake  Had a similar presentation in 1/2024 due to SSSS with septic shock and gram positive bacteremia requiring prolonged IV antibiotics  Was initially begun on Cefepime and Vancomycin  1 of 2 blood cultures positive for MRSA and coagulase-negative staphylococcus  Cefepime discontinued on 6/7/24 and Vancomycin continued  MRI thoracic spine (6/9/24) - Findings suspicious for discitis/osteomyelitis at T9-T10. No epidural collection.  Wound cultures - staph aureus  TTE - no vegetations  Linezolid added on 6/10/24   Repeat blood culture from 6/10/2024 negativ  Monitor temperature, WBC, follow-up repeat blood cultures  ID is following, with plan for 6 weeks of IV antibiotic through 7/22/2024  S/p picc line placement, remove immediately after the last dose

## 2024-06-18 NOTE — CASE MANAGEMENT
Case Management Discharge Planning Note    Patient name Bhavna Al  Location [facility] 820/[facility] 820-01 MRN [id number]  : 1950 Date 2024       Current Admission Date: 2024  Current Admission Diagnosis:MRSA bacteremia   Patient Active Problem List    Diagnosis Date Noted Date Diagnosed    Secondary adrenal insufficiency (HCC) 2024     Pressure ulcer of left buttock, stage 3 ([facility]) 2024     Acute osteomyelitis of thoracic spine ([facility]) 2024     MRSA bacteremia 2024     Hypotension 2024     Dysphagia 2024     Deep tissue injury 2024     Hypernatremia 2024     Localized swelling on left hand 2023     Stage 3 chronic kidney disease ([facility]) 2023     Febrile illness 2023     Dermatitis associated with incontinence 2023     Right shoulder pain 12/15/2022     Abnormal urinalysis 2022     Ambulatory dysfunction 2022     Hyperuricemia 2022     Acute metabolic encephalopathy 2022     Chronic diastolic heart failure ([facility]) 2022     Acute bronchitis 2022     Assistance needed with transportation 09/15/2022     Abnormal CT of the chest 2022     Gram-positive cocci bacteremia 2022     Psoriasis 2022     Elevated troponin 2022     COVID-19 2022     Acute kidney injury superimposed on CKD  ([facility]) 01/10/2022     Paroxysmal atrial fibrillation ([facility]) 01/10/2022     Hyperparathyroidism ([facility]) 2021     Murmur, cardiac 2021     BPPV (benign paroxysmal positional vertigo) 2018     Seasonal allergic rhinitis due to pollen 2018     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     Osteoporosis 2016     SIRS (systemic inflammatory response syndrome) ([facility]) 2016     Rheumatoid arthritis ([facility]) 2016     GERD (gastroesophageal reflux disease) 2016     Sarcoid 2016     Morbid obesity ([facility]) 2016     Vitamin D deficiency 2015     Essential  hypertension 12/04/2014     Lumbar radiculopathy 12/04/2014       LOS (days): 12  Geometric Mean LOS (GMLOS) (days): 6.4  Days to GMLOS:-5.4     OBJECTIVE:  Risk of Unplanned Readmission Score: 28.14         Current admission status: Inpatient   Preferred Pharmacy:   [id number] - [place], [place] - [address]  [address]  [place] [address]  Phone: [phone] Fax: [phone]    [facility] - [place], [place] - [address] [place] [address]  Phone: [phone] Fax: [phone]    Primary Care Provider: Nya Taveras DO    Primary Insurance: MEDICARE  Secondary Insurance: [facility]    DISCHARGE DETAILS:    Transported by (Company and Unit #): [facility]     Accepting Facility Name, City & State : [facility]  Receiving Facility/Agency Phone Number: [phone]  Facility/Agency Fax Number: [phone]

## 2024-06-18 NOTE — ASSESSMENT & PLAN NOTE
Lab Results   Component Value Date    EGFR 42 06/18/2024    EGFR 53 06/17/2024    EGFR 53 06/16/2024    CREATININE 1.24 06/18/2024    CREATININE 1.02 06/17/2024    CREATININE 1.02 06/16/2024     CKD 3 at baseline with baseline Cr 0.8-1.0  AMANDA on admission with creatinine 1.75 and worsened to 1.85  Likely prerenal azotemia due to reduced p.o. intake for several days prior to admission  Creatinine improving   Continue to hold diuretics given current hypernatremia

## 2024-06-18 NOTE — DISCHARGE SUMMARY
[facility]  Discharge- Bhavna Al 1950, 73 y.o. female MRN: [id number]  Unit/Bed#: [facility] 820-01 Encounter: [id number]  Primary Care Provider: Nya Taveras DO   Date and time admitted to hospital: 6/6/2024 10:21 AM    * MRSA bacteremia  Assessment & Plan  Presented on 6/6/24 with generalized weakness, requiring increased assistance with ADL's, back pain and poor oral intake  Had a similar presentation in 1/2024 due to SSSS with septic shock and gram positive bacteremia requiring prolonged IV antibiotics  Was initially begun on Cefepime and Vancomycin  1 of 2 blood cultures positive for MRSA and coagulase-negative staphylococcus  Cefepime discontinued on 6/7/24 and Vancomycin continued  MRI thoracic spine (6/9/24) - Findings suspicious for discitis/osteomyelitis at T9-T10. No epidural collection.  Wound cultures - staph aureus  TTE - no vegetations  Linezolid added on 6/10/24   Repeat blood culture from 6/10/2024 negativ  Monitor temperature, WBC, follow-up repeat blood cultures  ID is following, with plan for 6 weeks of IV antibiotic through 7/22/2024  S/p picc line placement, remove immediately after the last dose    Pressure ulcer of left buttock, stage 3 (HCC)  Assessment & Plan  POA  Wound care    Secondary adrenal insufficiency (HCC)  Assessment & Plan  Possible secondary adrenal insufficiency in the setting of use of chronic prednisone  Hydrocortisone being tapered as above  Continue home dose prednisone    Dysphagia  Assessment & Plan  Diet advanced to dental soft per speech recommendation    Hypotension  Assessment & Plan  Blood pressure is improving  Monitor    Acute osteomyelitis of thoracic spine (HCC)  Assessment & Plan  MRI with T9-T10 discitis/vertebral osteomyelitis without epidural collection  Per neurosurgery - defer bracing in the setting of extensive skin changes  Antibiotics as above    Hypernatremia  Assessment & Plan  Likely secondary due to poor  oral intake  Resolved with hypotonic IV fluid  Monitor sodium level  DC IV fluid    Acute metabolic encephalopathy  Assessment & Plan  Lethargy on presentation in the setting of above  Continue supportive care   Plan as above    Chronic diastolic heart failure (HCC)  Assessment & Plan  Wt Readings from Last 3 Encounters:   06/18/24 108 kg (238 lb 8.6 oz)   06/08/24 103 kg (228 lb)   02/19/24 113 kg (249 lb)     Last EF 65% in 1/2024 although poor windows; prior TTE in 11/2022 with EF 55%  Home Lasix held as hypovolemic/AMANDA  Resume as able  Monitor volume status      Psoriasis  Assessment & Plan  Has multiple wounds  Recent diagnosis of SSSS  S/p punch biopsy by dermatology  Wound cultures - staph aureus  Continue with antibiotic treatment and local care      Paroxysmal atrial fibrillation (HCC)  Assessment & Plan  Maintained at home with metoprolol tartrate 25mg BID  Continue Eliquis    Acute kidney injury superimposed on CKD  (HCC)  Assessment & Plan  Lab Results   Component Value Date    EGFR 42 06/18/2024    EGFR 53 06/17/2024    EGFR 53 06/16/2024    CREATININE 1.24 06/18/2024    CREATININE 1.02 06/17/2024    CREATININE 1.02 06/16/2024     CKD 3 at baseline with baseline Cr 0.8-1.0  AMANDA on admission with creatinine 1.75 and worsened to 1.85  Likely prerenal azotemia due to reduced p.o. intake for several days prior to admission  Creatinine improving   Continue to hold diuretics given current hypernatremia    Hyperparathyroidism (HCC)  Assessment & Plan  Not on medications at home  Monitor Ca and P levels inpatient  No need for Nephro consult at this time    Vitamin D deficiency  Assessment & Plan  Continue Vitamin D supplementation    Osteoporosis  Assessment & Plan  Regimen includes calcium plus vitamin D supplements as well as weekly alendronate  Continue calcium/vitamin D supplementation inpatient, hold weekly alendronate  Fall precautions    Essential hypertension  Assessment & Plan  home Lopressor 25 mg  twice daily  Continue current dose of Lopressor    GERD (gastroesophageal reflux disease)  Assessment & Plan  Continue home PPI    Rheumatoid arthritis ([facility])  Assessment & Plan  Follows with rheumatology outpatient  Continue home hydroxychloroquine 200 mg twice daily   Continue home dose prednisone    SIRS (systemic inflammatory response syndrome) ([facility])  Assessment & Plan  With fever and tachycardia  Likely due to MRSA bacteremia  Plan as above      Medical Problems       Resolved Problems  Date Reviewed: 6/17/2024   None       Discharging Physician / Practitioner: Jason Quach MD  PCP: Nya Taveras DO  Admission Date:   Admission Orders (From admission, onward)       Ordered        06/06/24 1251  INPATIENT ADMISSION  Once                          Discharge Date: 06/18/24    Disposition:    Other Skilled Nursing Facility at [place]    Reason for Admission: lethargy weakness    Discharge Diagnoses:   Please see assessment and plan section above for further details regarding discharge diagnoses.     Consultations During Hospital Stay:  Endocrinology  Neurosurgery  Dermatology  IR  Infectious disease    Procedures Performed:   Central line   Insert complex venous access    Significant Findings / Test Results:   Wound culture MRSA  Blood culture MRSA  MRI thoracic spine: Findings suspicious for discitis/osteomyelitis at T9-T10. No epidural collection   Scoliosis and degenerative changes.   MRI lumbar spine: No evidence of discitis/osteomyelitis or epidural collection in the lumbar spine.   See concurrent thoracic MRI for discussion of suspected discitis/osteomyelitis at T9-T10.   Degenerative spondylosis most pronounced at L4-5.   MRI brain: no acute intracranial pathology  CT chest abdomen and pelvis: Mild pulmonary edema likely secondary to CHF. Small pleural effusions. No acute findings in the abdomen or pelvis.     Incidental Findings:   None       Test Results Pending at Discharge (will require follow  "up):   None     Outpatient Tests Requested:  Weekly CPK, CBC, CMP    Complications:  adrenal insufficiency    Hospital Course:      Bhavna Al is a 73 y.o. female patient who originally presented to the hospital on 6/6/2024 due to change in mental status and weakness. She was admitted for further evaluation and found to have MRSA bacteremia. She was evaluated by ID and dermatology and found to have SSSS. She was treated with antibiotics and blood cultures cleared. She was discharged to SNF with daptomycin through 7/22.      Condition at Discharge: stable    Discharge Day Visit / Exam:   Subjective:  denies any new complaints.  Vitals: Blood Pressure: 124/74 (06/18/24 0726)  Pulse: 77 (06/18/24 0726)  Temperature: 97.7 °F (36.5 °C) (06/18/24 0726)  Temp Source: Oral (06/17/24 2234)  Respirations: 16 (06/18/24 0726)  Height: 5' 3\" (160 cm) (06/08/24 1030)  Weight - Scale: 108 kg (238 lb 8.6 oz) (06/18/24 0600)  SpO2: 99 % (06/18/24 0726)  Exam:   Physical Exam  Constitutional:       Appearance: Normal appearance.   HENT:      Head: Normocephalic and atraumatic.      Nose: Nose normal.   Eyes:      Extraocular Movements: Extraocular movements intact.   Cardiovascular:      Rate and Rhythm: Normal rate and regular rhythm.   Pulmonary:      Effort: Pulmonary effort is normal.   Abdominal:      General: There is no distension.      Palpations: Abdomen is soft.      Tenderness: There is no abdominal tenderness.   Neurological:      Mental Status: She is alert. Mental status is at baseline.   Psychiatric:         Mood and Affect: Mood normal.         Behavior: Behavior normal.            Medication Adjustments and Discharge Medications:  Discharge Medication List: See after visit summary for reconciled discharge medications.   Medication Dosing Tapers - Please refer to Discharge Medication List for details on any medication dosing tapers (if applicable to patient).   Summary of Medication Adjustments made as a result of " this hospitalization: see list  Medications being temporarily held (include recommended restart time): None    Wound Care Recommendations:  When applicable, please see wound care section of After Visit Summary.    Instructions for any Catheters / Lines Present at Discharge (including removal date, if applicable): None    Diet Recommendations at Discharge:  Diet -        Diet Orders   (From admission, onward)                 Start     Ordered    06/09/24 1007  Diet Dysphagia/Modified Consistency; Dysphagia 3-Dental Soft; Thin Liquid  (ED Bridging Orders Panel)  Diet effective now        References:    Adult Nutrition Support Algorithm    RD Therapeutic Diet Order Protocol   Question Answer Comment   Diet Type Dysphagia/Modified Consistency    Dysphagia/Modified Consistency Dysphagia 3-Dental Soft    Liquid Modifier Thin Liquid    RD to adjust diet per protocol? Yes        06/09/24 1006    06/07/24 1423  Dietary nutrition supplements  Once        Question Answer Comment   Select Supplement: Ensure Plus High Protein Chocolate    Frequency Dinner        06/07/24 1422    06/07/24 1423  Dietary nutrition supplements  Once        Question Answer Comment   Select Supplement: Ensure Plus High Protein Vanilla    Frequency Breakfast        06/07/24 1422                    Mobility at time of Discharge:   Basic Mobility Inpatient Raw Score: 7  JH-HLM Goal: 2: Bed activities/Dependent transfer  JH-HLM Achieved: 2: Bed activities/Dependent transfer  HLM Goal achieved. Continue to encourage appropriate mobility.    Goals of Care Discussions:  Code Status at Discharge: Level 1 - Full Code  Goals of care were not discussed during this admission.    Discharge instructions/Information to patient and family:   See after visit summary section titled Discharge Instructions for information provided to patient and family.      Planned Readmission: No      Discharge Statement:  I spent 45 minutes discharging the patient. This time was spent  on the day of discharge. I had direct contact with the patient on the day of discharge. Greater than 50% of the total time was spent examining patient, answering all patient questions, arranging and discussing plan of care with patient as well as directly providing post-discharge instructions.  Additional time then spent on discharge activities.    **Please Note: This note may have been constructed using a voice recognition system.**

## 2024-06-19 ENCOUNTER — TELEPHONE (OUTPATIENT)
Dept: INFECTIOUS DISEASES | Facility: CLINIC | Age: 74
End: 2024-06-19

## 2024-06-19 DIAGNOSIS — M46.24 ACUTE OSTEOMYELITIS OF THORACIC SPINE (HCC): ICD-10-CM

## 2024-06-19 DIAGNOSIS — R78.81 GRAM-POSITIVE COCCI BACTEREMIA: Primary | ICD-10-CM

## 2024-06-19 DIAGNOSIS — B95.62 MRSA BACTEREMIA: ICD-10-CM

## 2024-06-19 DIAGNOSIS — R78.81 MRSA BACTEREMIA: ICD-10-CM

## 2024-06-19 NOTE — TELEPHONE ENCOUNTER
Spoke with Ricarda at [facility].  Patient is on second floor.  Confirmed with Ricarda that actual dose of daptomycin is 600mg IV Q24, which I confirmed with Dr. Piedra. Patient was discharged on 500mg. They have this changed. Ricarda confirmed weekly labs as well as follow up. She does not have questions/concerns.    [place] fax: [phone]

## 2024-06-19 NOTE — PROGRESS NOTES
OPAT NOTE    Supervising/Discharge physician: Tadeo    Diagnosis: MRSA Bacteremia/T9-10 Discitis OM    Drug: Daptomycin    Dose/Route/Frequency:600mg IV Q24    Labs/Frequency: cbcd bmp ck esr crp weekly    End Date: 7/22    Infusion/VNA contact: Saw    Next appointment: 6/27        MA assigned:  Naima

## 2024-06-19 NOTE — TELEPHONE ENCOUNTER
06/19/2024- PT WAS DISCHARGED TO [facility]. CALLED [facility] AND SPOKE WITH KADE CONFIRMING 07/09/2024 APT W/ X-RAY NEEDED PRIOR. SCRIPT WAS FAXED OVER [phone].

## 2024-06-24 NOTE — ASSESSMENT & PLAN NOTE
Need to monitor closely while on daptomycin as the dose of the daptomycin needs to be decreased to every 48 hours if the creatinine clearance drops below 30.    Lab Results   Component Value Date    EGFR 42 06/18/2024    EGFR 53 06/17/2024    EGFR 53 06/16/2024    CREATININE 1.24 06/18/2024    CREATININE 1.02 06/17/2024    CREATININE 1.02 06/16/2024

## 2024-06-24 NOTE — ASSESSMENT & PLAN NOTE
With MRI findings revealing T9-10 discitis/vertebral osteomyelitis.  With MRSA and coagulase-negative Staphylococcus isolated in 1 out of 2 blood cultures.  While this could be a contaminant with only 1 out of 2 sets positive, in the setting of clinical sepsis and significant cutaneous breakdown, more likely a true bacteremia.  Fortunately the patient remains hemodynamically stable.  Repeat blood cultures are negative.  Transthoracic echocardiogram limited but without vegetation seen.  Repeat sedimentation rate is 41 on 6/24/2024.  -Continue daptomycin 8 mg/kg IV every 24 hours to be continued through 7/22/24 as below  -Check CBC with differential, BMP, CPK, sedimentation rate and CRP weekly while on the IV daptomycin to make sure no developing toxicities  -Remove PICC line after last dose of intravenous antibiotics  -Anticipate the need for oral antibiotics with doxycycline after completion of the IV antibiotics until the sedimentation rate normalizes or stabilizes.

## 2024-06-24 NOTE — PROGRESS NOTES
Progress Note - Infectious Disease   Bhavna Al 73 y.o. female MRN: [id number]  Unit/Bed#:  Encounter: [id number]      Impression/Plan:  MRSA bacteremia  With MRSA and coagulase-negative Staphylococcus isolated in 1 out of 2 blood cultures.  While this could be a contaminant with only 1 out of 2 sets positive, in the setting of clinical sepsis and significant cutaneous breakdown, more likely a true bacteremia.  Fortunately the patient remains hemodynamically stable.  Repeat blood cultures are negative.  Transthoracic echocardiogram limited but without vegetation seen  -Continue daptomycin 8 mg/kg IV every 24 hours to be continued through 7/22/24 as below  -Remove PICC line after last dose of intravenous antibiotics    Acute osteomyelitis of thoracic spine (HCC)  With MRI findings revealing T9-10 discitis/vertebral osteomyelitis.  With MRSA and coagulase-negative Staphylococcus isolated in 1 out of 2 blood cultures.  While this could be a contaminant with only 1 out of 2 sets positive, in the setting of clinical sepsis and significant cutaneous breakdown, more likely a true bacteremia.  Fortunately the patient remains hemodynamically stable.  Repeat blood cultures are negative.  Transthoracic echocardiogram limited but without vegetation seen.  Repeat sedimentation rate is 41.  -Continue daptomycin 8 mg/kg IV every 24 hours to be continued through 7/22/24 as below  -Check CBC with differential, BMP, CPK, sedimentation rate and CRP weekly while on the IV daptomycin to make sure no developing toxicities  -Remove PICC line after last dose of intravenous antibiotics  -Anticipate the need for oral antibiotics with doxycycline after completion of the IV antibiotics until the sedimentation rate normalizes or stabilizes.    Acute metabolic encephalopathy  Waxing and waning while in the hospital and likely related to the acute illness and numerous metabolic issues that have intermittently arisen.  Monitor cognition for  "now.    Rheumatoid arthritis (HCC)  On hydroxychloroquine and prednisone.  Follow-up with rheumatology    Psoriasis  Confirmed with biopsy during recent hospital stay in 6/2024.  Needs dermatology follow-up.  Topical treatment for now.    Acute kidney injury superimposed on CKD  (HCC)  Need to monitor closely while on daptomycin as the dose of the daptomycin needs to be decreased to every 48 hours if the creatinine clearance drops below 30.    Lab Results   Component Value Date    EGFR 42 06/18/2024    EGFR 53 06/17/2024    EGFR 53 06/16/2024    CREATININE 1.24 06/18/2024    CREATININE 1.02 06/17/2024    CREATININE 1.02 06/16/2024       Leukopenia  Was felt to be related to the vancomycin which has been discontinued.  Will continue to monitor closely for now.    Follow-up with infectious diseases in 2 to 3 weeks.    I have spent a total time of 40 minutes on 06/27/24 in caring for this patient including Diagnostic results, Patient and family education, Impressions, Counseling / Coordination of care, Documenting in the medical record, Reviewing / ordering tests, medicine, procedures  , and Obtaining or reviewing history  .     Antibiotics:  Daptomycin 11  Negative blood cultures 17    Subjective:  Patient has no fever, chills, sweats; no nausea, vomiting, diarrhea; no cough, shortness of breath; no pain. No new symptoms.    ROS:  A complete review of systems is negative other than that noted above in the subjective    Followup portions patient history reviewed and updated as:  Allergies, current medications, past medical history, past social history, past surgical history, and the problem list    Objective:  Vitals:  Vitals:    06/27/24 1351   BP: 116/80   Pulse: 68   Resp: 16   Temp: 97.8 °F (36.6 °C)   SpO2: 97%   Height: 5' 3\" (1.6 m)       Physical Exam:   General Appearance:  Alert, interactive, appearing well, nontoxic, no acute distress.   Throat: Oropharynx moist without lesions.    Lungs:   Clear to " auscultation bilaterally; no audible wheezes, rhonchi or rales; respirations unlabored   Heart:  RRR; no murmur, rub or gallop   Abdomen:   Soft, non-tender, non-distended, positive bowel sounds.     Extremities: No clubbing, cyanosis or edema   Skin: No new rashes or lesions. No new draining wounds.       Labs, Imaging, & Other studies:   All pertinent labs and imaging studies were personally reviewed    CPK 48, creatinine 1.24, white blood cell count 4.83, platelet 135

## 2024-06-24 NOTE — ASSESSMENT & PLAN NOTE
Confirmed with biopsy during recent hospital stay in 6/2024.  Needs dermatology follow-up.  Topical treatment for now.

## 2024-06-24 NOTE — ASSESSMENT & PLAN NOTE
Waxing and waning while in the hospital and likely related to the acute illness and numerous metabolic issues that have intermittently arisen.  Monitor cognition for now.

## 2024-06-24 NOTE — ASSESSMENT & PLAN NOTE
With MRSA and coagulase-negative Staphylococcus isolated in 1 out of 2 blood cultures.  While this could be a contaminant with only 1 out of 2 sets positive, in the setting of clinical sepsis and significant cutaneous breakdown, more likely a true bacteremia.  Fortunately the patient remains hemodynamically stable.  Repeat blood cultures are negative.  Transthoracic echocardiogram limited but without vegetation seen  -Continue daptomycin 8 mg/kg IV every 24 hours to be continued through 7/22/24 as below  -Remove PICC line after last dose of intravenous antibiotics

## 2024-06-24 NOTE — ASSESSMENT & PLAN NOTE
Was felt to be related to the vancomycin which has been discontinued.  Will continue to monitor closely for now.

## 2024-06-25 ENCOUNTER — TELEPHONE (OUTPATIENT)
Age: 74
End: 2024-06-25

## 2024-06-25 NOTE — TELEPHONE ENCOUNTER
Nurse Ramsey called from [facility] stating that they were made aware that transport for the patient is running behind by 45 minutes.     Patient has [facility] appointment at 1100, Ramsey asking to see if we can push to later time.      Reached out to  to check, could not be seen today due to scheduling, but could be seen 6/27 at noon.  María Elena in the office confirmed that would work and  was advised of the date change.

## 2024-06-27 ENCOUNTER — OFFICE VISIT (OUTPATIENT)
Dept: INFECTIOUS DISEASES | Facility: CLINIC | Age: 74
End: 2024-06-27
Payer: MEDICARE

## 2024-06-27 ENCOUNTER — TELEPHONE (OUTPATIENT)
Dept: INFECTIOUS DISEASES | Facility: CLINIC | Age: 74
End: 2024-06-27

## 2024-06-27 VITALS
HEART RATE: 68 BPM | SYSTOLIC BLOOD PRESSURE: 116 MMHG | OXYGEN SATURATION: 97 % | DIASTOLIC BLOOD PRESSURE: 80 MMHG | RESPIRATION RATE: 16 BRPM | HEIGHT: 63 IN | TEMPERATURE: 97.8 F | BODY MASS INDEX: 42.26 KG/M2

## 2024-06-27 DIAGNOSIS — N18.9 ACUTE KIDNEY INJURY SUPERIMPOSED ON CKD  (HCC): ICD-10-CM

## 2024-06-27 DIAGNOSIS — M46.24 ACUTE OSTEOMYELITIS OF THORACIC SPINE (HCC): ICD-10-CM

## 2024-06-27 DIAGNOSIS — D72.819 LEUKOPENIA: ICD-10-CM

## 2024-06-27 DIAGNOSIS — L40.9 PSORIASIS: ICD-10-CM

## 2024-06-27 DIAGNOSIS — B95.62 MRSA BACTEREMIA: Primary | ICD-10-CM

## 2024-06-27 DIAGNOSIS — G93.41 ACUTE METABOLIC ENCEPHALOPATHY: ICD-10-CM

## 2024-06-27 DIAGNOSIS — R78.81 MRSA BACTEREMIA: Primary | ICD-10-CM

## 2024-06-27 DIAGNOSIS — M06.9 RHEUMATOID ARTHRITIS INVOLVING MULTIPLE SITES, UNSPECIFIED WHETHER RHEUMATOID FACTOR PRESENT (HCC): Chronic | ICD-10-CM

## 2024-06-27 DIAGNOSIS — N17.9 ACUTE KIDNEY INJURY SUPERIMPOSED ON CKD  (HCC): ICD-10-CM

## 2024-06-27 PROCEDURE — 99215 OFFICE O/P EST HI 40 MIN: CPT | Performed by: INTERNAL MEDICINE

## 2024-06-27 NOTE — TELEPHONE ENCOUNTER
Contacted [facility] to inquire about patient's labs from this week, as patient has follow up with us today. After multiple attempts and transfers to nurses station. Call was dropped. Will try back later.

## 2024-06-29 ENCOUNTER — APPOINTMENT (EMERGENCY)
Dept: RADIOLOGY | Facility: HOSPITAL | Age: 74
DRG: 291 | End: 2024-06-29
Payer: MEDICARE

## 2024-06-29 ENCOUNTER — HOSPITAL ENCOUNTER (INPATIENT)
Facility: HOSPITAL | Age: 74
LOS: 6 days | Discharge: NURSING, REHAB OR LONG-TERM CARE | DRG: 291 | End: 2024-07-05
Attending: EMERGENCY MEDICINE | Admitting: INTERNAL MEDICINE
Payer: MEDICARE

## 2024-06-29 DIAGNOSIS — N18.30 STAGE 3 CHRONIC KIDNEY DISEASE, UNSPECIFIED WHETHER STAGE 3A OR 3B CKD (HCC): ICD-10-CM

## 2024-06-29 DIAGNOSIS — I50.9 CHF (CONGESTIVE HEART FAILURE) (HCC): Primary | ICD-10-CM

## 2024-06-29 DIAGNOSIS — R78.81 MRSA BACTEREMIA: ICD-10-CM

## 2024-06-29 DIAGNOSIS — B95.62 MRSA BACTEREMIA: ICD-10-CM

## 2024-06-29 DIAGNOSIS — M46.24 ACUTE OSTEOMYELITIS OF THORACIC SPINE (HCC): ICD-10-CM

## 2024-06-29 DIAGNOSIS — L89.323 PRESSURE ULCER OF LEFT BUTTOCK, STAGE 3 (HCC): ICD-10-CM

## 2024-06-29 DIAGNOSIS — I48.91 ATRIAL FIBRILLATION WITH RVR (HCC): ICD-10-CM

## 2024-06-29 PROBLEM — I50.33 ACUTE ON CHRONIC DIASTOLIC HEART FAILURE (HCC): Status: ACTIVE | Noted: 2022-11-19

## 2024-06-29 LAB
2HR DELTA HS TROPONIN: 43 NG/L
4HR DELTA HS TROPONIN: 84 NG/L
ALBUMIN SERPL BCG-MCNC: 3.1 G/DL (ref 3.5–5)
ALP SERPL-CCNC: 60 U/L (ref 34–104)
ALT SERPL W P-5'-P-CCNC: 31 U/L (ref 7–52)
ANION GAP SERPL CALCULATED.3IONS-SCNC: 12 MMOL/L (ref 4–13)
APTT PPP: 22 SECONDS (ref 23–37)
AST SERPL W P-5'-P-CCNC: 60 U/L (ref 13–39)
ATRIAL RATE: 129 BPM
ATRIAL RATE: 141 BPM
BASE EX.OXY STD BLDV CALC-SCNC: 79 % (ref 60–80)
BASE EXCESS BLDV CALC-SCNC: 0.7 MMOL/L
BASOPHILS # BLD AUTO: 0.02 THOUSANDS/ÂΜL (ref 0–0.1)
BASOPHILS NFR BLD AUTO: 0 % (ref 0–1)
BILIRUB SERPL-MCNC: 0.68 MG/DL (ref 0.2–1)
BNP SERPL-MCNC: 343 PG/ML (ref 0–100)
BUN SERPL-MCNC: 10 MG/DL (ref 5–25)
CALCIUM ALBUM COR SERPL-MCNC: 9.6 MG/DL (ref 8.3–10.1)
CALCIUM SERPL-MCNC: 8.9 MG/DL (ref 8.4–10.2)
CARDIAC TROPONIN I PNL SERPL HS: 280 NG/L
CARDIAC TROPONIN I PNL SERPL HS: 323 NG/L
CARDIAC TROPONIN I PNL SERPL HS: 364 NG/L
CHLORIDE SERPL-SCNC: 106 MMOL/L (ref 96–108)
CO2 SERPL-SCNC: 22 MMOL/L (ref 21–32)
CREAT SERPL-MCNC: 1.28 MG/DL (ref 0.6–1.3)
EOSINOPHIL # BLD AUTO: 0.1 THOUSAND/ÂΜL (ref 0–0.61)
EOSINOPHIL NFR BLD AUTO: 2 % (ref 0–6)
ERYTHROCYTE [DISTWIDTH] IN BLOOD BY AUTOMATED COUNT: 16.1 % (ref 11.6–15.1)
GFR SERPL CREATININE-BSD FRML MDRD: 40 ML/MIN/1.73SQ M
GLUCOSE SERPL-MCNC: 79 MG/DL (ref 65–140)
HCO3 BLDV-SCNC: 24.2 MMOL/L (ref 24–30)
HCT VFR BLD AUTO: 37.6 % (ref 34.8–46.1)
HGB BLD-MCNC: 11.7 G/DL (ref 11.5–15.4)
IMM GRANULOCYTES # BLD AUTO: 0.03 THOUSAND/UL (ref 0–0.2)
IMM GRANULOCYTES NFR BLD AUTO: 1 % (ref 0–2)
INR PPP: 1.42 (ref 0.84–1.19)
LACTATE SERPL-SCNC: 2.1 MMOL/L (ref 0.5–2)
LYMPHOCYTES # BLD AUTO: 0.79 THOUSANDS/ÂΜL (ref 0.6–4.47)
LYMPHOCYTES NFR BLD AUTO: 13 % (ref 14–44)
MCH RBC QN AUTO: 27.9 PG (ref 26.8–34.3)
MCHC RBC AUTO-ENTMCNC: 31.1 G/DL (ref 31.4–37.4)
MCV RBC AUTO: 90 FL (ref 82–98)
MONOCYTES # BLD AUTO: 0.13 THOUSAND/ÂΜL (ref 0.17–1.22)
MONOCYTES NFR BLD AUTO: 2 % (ref 4–12)
NEUTROPHILS # BLD AUTO: 5.26 THOUSANDS/ÂΜL (ref 1.85–7.62)
NEUTS SEG NFR BLD AUTO: 82 % (ref 43–75)
NRBC BLD AUTO-RTO: 0 /100 WBCS
O2 CT BLDV-SCNC: 13.1 ML/DL
PCO2 BLDV: 34.9 MM HG (ref 42–50)
PH BLDV: 7.46 [PH] (ref 7.3–7.4)
PLATELET # BLD AUTO: 232 THOUSANDS/UL (ref 149–390)
PMV BLD AUTO: 12.5 FL (ref 8.9–12.7)
PO2 BLDV: 42.6 MM HG (ref 35–45)
POTASSIUM SERPL-SCNC: 4.9 MMOL/L (ref 3.5–5.3)
PROCALCITONIN SERPL-MCNC: 0.23 NG/ML
PROT SERPL-MCNC: 6.7 G/DL (ref 6.4–8.4)
PROTHROMBIN TIME: 17.6 SECONDS (ref 11.6–14.5)
QRS AXIS: -2 DEGREES
QRS AXIS: 27 DEGREES
QRSD INTERVAL: 88 MS
QRSD INTERVAL: 94 MS
QT INTERVAL: 340 MS
QT INTERVAL: 342 MS
QTC INTERVAL: 481 MS
QTC INTERVAL: 507 MS
RBC # BLD AUTO: 4.2 MILLION/UL (ref 3.81–5.12)
SODIUM SERPL-SCNC: 140 MMOL/L (ref 135–147)
T WAVE AXIS: 125 DEGREES
T WAVE AXIS: 210 DEGREES
VENTRICULAR RATE: 119 BPM
VENTRICULAR RATE: 134 BPM
WBC # BLD AUTO: 6.33 THOUSAND/UL (ref 4.31–10.16)

## 2024-06-29 PROCEDURE — 96374 THER/PROPH/DIAG INJ IV PUSH: CPT

## 2024-06-29 PROCEDURE — 85025 COMPLETE CBC W/AUTO DIFF WBC: CPT | Performed by: EMERGENCY MEDICINE

## 2024-06-29 PROCEDURE — 84145 PROCALCITONIN (PCT): CPT | Performed by: EMERGENCY MEDICINE

## 2024-06-29 PROCEDURE — 85610 PROTHROMBIN TIME: CPT | Performed by: EMERGENCY MEDICINE

## 2024-06-29 PROCEDURE — 87040 BLOOD CULTURE FOR BACTERIA: CPT | Performed by: EMERGENCY MEDICINE

## 2024-06-29 PROCEDURE — 99223 1ST HOSP IP/OBS HIGH 75: CPT | Performed by: INTERNAL MEDICINE

## 2024-06-29 PROCEDURE — 02HV33Z INSERTION OF INFUSION DEVICE INTO SUPERIOR VENA CAVA, PERCUTANEOUS APPROACH: ICD-10-PCS | Performed by: INTERNAL MEDICINE

## 2024-06-29 PROCEDURE — 80053 COMPREHEN METABOLIC PANEL: CPT | Performed by: EMERGENCY MEDICINE

## 2024-06-29 PROCEDURE — 84484 ASSAY OF TROPONIN QUANT: CPT | Performed by: EMERGENCY MEDICINE

## 2024-06-29 PROCEDURE — 94002 VENT MGMT INPAT INIT DAY: CPT

## 2024-06-29 PROCEDURE — 83605 ASSAY OF LACTIC ACID: CPT | Performed by: EMERGENCY MEDICINE

## 2024-06-29 PROCEDURE — 99285 EMERGENCY DEPT VISIT HI MDM: CPT

## 2024-06-29 PROCEDURE — 99291 CRITICAL CARE FIRST HOUR: CPT | Performed by: EMERGENCY MEDICINE

## 2024-06-29 PROCEDURE — 83880 ASSAY OF NATRIURETIC PEPTIDE: CPT | Performed by: EMERGENCY MEDICINE

## 2024-06-29 PROCEDURE — 36556 INSERT NON-TUNNEL CV CATH: CPT | Performed by: EMERGENCY MEDICINE

## 2024-06-29 PROCEDURE — 93005 ELECTROCARDIOGRAM TRACING: CPT

## 2024-06-29 PROCEDURE — 82805 BLOOD GASES W/O2 SATURATION: CPT | Performed by: EMERGENCY MEDICINE

## 2024-06-29 PROCEDURE — 84484 ASSAY OF TROPONIN QUANT: CPT | Performed by: INTERNAL MEDICINE

## 2024-06-29 PROCEDURE — 71045 X-RAY EXAM CHEST 1 VIEW: CPT

## 2024-06-29 PROCEDURE — 85730 THROMBOPLASTIN TIME PARTIAL: CPT | Performed by: EMERGENCY MEDICINE

## 2024-06-29 PROCEDURE — 36415 COLL VENOUS BLD VENIPUNCTURE: CPT | Performed by: EMERGENCY MEDICINE

## 2024-06-29 PROCEDURE — 94760 N-INVAS EAR/PLS OXIMETRY 1: CPT

## 2024-06-29 PROCEDURE — 93010 ELECTROCARDIOGRAM REPORT: CPT | Performed by: INTERNAL MEDICINE

## 2024-06-29 RX ORDER — ALBUMIN (HUMAN) 12.5 G/50ML
12.5 SOLUTION INTRAVENOUS ONCE
Status: COMPLETED | OUTPATIENT
Start: 2024-06-29 | End: 2024-06-29

## 2024-06-29 RX ORDER — ALBUTEROL SULFATE 2.5 MG/3ML
1 SOLUTION RESPIRATORY (INHALATION) ONCE
Status: COMPLETED | OUTPATIENT
Start: 2024-06-29 | End: 2024-06-29

## 2024-06-29 RX ORDER — HYDROXYCHLOROQUINE SULFATE 200 MG/1
200 TABLET, FILM COATED ORAL 2 TIMES DAILY
Status: DISCONTINUED | OUTPATIENT
Start: 2024-06-29 | End: 2024-07-05 | Stop reason: HOSPADM

## 2024-06-29 RX ORDER — ALBUTEROL SULFATE 2.5 MG/3ML
SOLUTION RESPIRATORY (INHALATION)
Status: COMPLETED
Start: 2024-06-29 | End: 2024-06-29

## 2024-06-29 RX ORDER — PREDNISONE 5 MG/1
5 TABLET ORAL 2 TIMES DAILY WITH MEALS
Status: DISCONTINUED | OUTPATIENT
Start: 2024-06-29 | End: 2024-07-05 | Stop reason: HOSPADM

## 2024-06-29 RX ORDER — LACTULOSE 10 G/15ML
20 SOLUTION ORAL 3 TIMES DAILY
Status: DISCONTINUED | OUTPATIENT
Start: 2024-06-29 | End: 2024-06-29

## 2024-06-29 RX ORDER — ACETAMINOPHEN 325 MG/1
650 TABLET ORAL EVERY 4 HOURS PRN
Status: DISCONTINUED | OUTPATIENT
Start: 2024-06-29 | End: 2024-07-05 | Stop reason: HOSPADM

## 2024-06-29 RX ORDER — DILTIAZEM HYDROCHLORIDE 5 MG/ML
10 INJECTION INTRAVENOUS ONCE
Status: COMPLETED | OUTPATIENT
Start: 2024-06-29 | End: 2024-06-29

## 2024-06-29 RX ORDER — FUROSEMIDE 10 MG/ML
20 INJECTION INTRAMUSCULAR; INTRAVENOUS ONCE
Status: COMPLETED | OUTPATIENT
Start: 2024-06-29 | End: 2024-06-29

## 2024-06-29 RX ORDER — ALBUTEROL SULFATE 90 UG/1
2 AEROSOL, METERED RESPIRATORY (INHALATION) EVERY 6 HOURS PRN
Status: DISCONTINUED | OUTPATIENT
Start: 2024-06-29 | End: 2024-07-05 | Stop reason: HOSPADM

## 2024-06-29 RX ORDER — PANTOPRAZOLE SODIUM 40 MG/1
40 TABLET, DELAYED RELEASE ORAL
Status: DISCONTINUED | OUTPATIENT
Start: 2024-06-30 | End: 2024-07-05 | Stop reason: HOSPADM

## 2024-06-29 RX ADMIN — METOPROLOL TARTRATE 25 MG: 25 TABLET, FILM COATED ORAL at 18:32

## 2024-06-29 RX ADMIN — ALBUMIN (HUMAN) 12.5 G: 0.25 INJECTION, SOLUTION INTRAVENOUS at 19:44

## 2024-06-29 RX ADMIN — DILTIAZEM HYDROCHLORIDE 10 MG: 5 INJECTION INTRAVENOUS at 17:23

## 2024-06-29 RX ADMIN — APIXABAN 5 MG: 5 TABLET, FILM COATED ORAL at 21:08

## 2024-06-29 RX ADMIN — FUROSEMIDE 20 MG: 10 INJECTION, SOLUTION INTRAMUSCULAR; INTRAVENOUS at 18:31

## 2024-06-29 RX ADMIN — PREDNISONE 5 MG: 5 TABLET ORAL at 18:32

## 2024-06-29 RX ADMIN — DAPTOMYCIN 600 MG: 500 INJECTION, POWDER, LYOPHILIZED, FOR SOLUTION INTRAVENOUS at 19:10

## 2024-06-29 NOTE — ED ATTENDING ATTESTATION
6/29/2024  IMegan DO, saw and evaluated the patient. I have discussed the patient with the resident/non-physician practitioner and agree with the resident's/non-physician practitioner's findings, Plan of Care, and MDM as documented in the resident's/non-physician practitioner's note, except where noted. All available labs and Radiology studies were reviewed.  I was present for key portions of any procedure(s) performed by the resident/non-physician practitioner and I was immediately available to provide assistance.       At this point I agree with the current assessment done in the Emergency Department.  I have conducted an independent evaluation of this patient a history and physical is as follows:      A 73-year-old female with past medical history of rheumatoid arthritis, hypertension, hyperparathyroidism, CKD, paroxysmal A-fib (on eliquis) and CHF; BIBA for acute respiratory distress.  Per EMS, pt was found hypoxic and in distress at her NH one hour prior to arrival.  Pt was trialed on supplemental oxygen at her facility, however ultimately transitioned to CPAP for transport.  On arrival to the ED, pt's work of breathing improved.  She was initially placed on BiPAP, however it was subsequently removed due to dry heaving.  Pt transitioned to nasal cannula, saturating well.  Pt overall limited in her history and ROS.    On review of records, pt was recently admitted from 6/6-18 for MRSA bacteremia with T9-T10 discitis/osteomyelitis.  She was discharged to rehab with a PICC line for IV daptomycin through 7/22.    Physical Exam  General Appearance: awake and alert, ill appearing  Skin:  Warm, dry.  Diffuse exfoliation of skin.  HEENT: atraumatic, normocephalic  Neck: Supple, trachea midline  Cardiac: irregularly irregular, rate 130's.  Telemetry consistent with atrial fibrillation.  No murmurs, rub, gallops  Pulmonary: Increased work of breathing.  Diminished breath sounds, R>L.  No wheezing or  rhonchi  Gastrointestinal: abdomen soft, nontender, nondistended; no guarding or rebound tenderness; good bowel sounds, no mass or bruits  Extremities:  2-3+ pitting edema to bilateral lower extremities, 2+ pulses.  No calf tenderness, no clubbing, no cyanosis  Neuro:  no focal motor or sensory deficits, CN 2-12 grossly intact  Psych:  Normal mood and affect, normal judgement and insight    Assessment and Plan:  Acute respiratory distress.  Pt initially required NiPPV, however now saturating well on nasal cannula.  Diminished air entry throughout with significant lower extremity edema, concern for pulmonary edema and CHF exacerbation.  Less likely infectious, however given recent admission for bacteremia will obtain repeat blood cultures.  Will also check labs for AMANDA, anemia and myocardial injury.  Will obtain CXR to evaluate for vascular congestion and effusions.           ED Course  ED Course as of 06/30/24 1708   Sat Jun 29, 2024   1555 Several attempts to place peripheral IV under US guidance unsuccessful.  PICC line not in appropriate positioning on CXR.  Given pt's critical status and lack of peripheral access, decision made to place central line.  Several attempts made to contact pt's daughter who is listed as the POA, unsuccessful.  Will proceed with placement of central line due to emergent needs.   1647 Successful placement of right IJ central line, blood withdrawals from all ports.  Will obtain CXR for placement.   1751 XR chest 1 view portable  Image independently interpreted by myself - Central line in place.  Increased interstitial markings consistent with pulmonary edema.   1804 LACTIC ACID(!): 2.1  History, exam and work up consistent with atrial fibrillation with RVR and pulmonary edema.  Less likely infectious etiology.  Will hold on IVF.         Critical Care Time  CriticalCare Time    Date/Time: 6/29/2024 6:03 PM    Performed by: Megan Alcala DO  Authorized by: Megan Alcala DO     Critical care provider statement:     Critical care time (minutes):  35    Critical care time was exclusive of:  Separately billable procedures and treating other patients and teaching time    Critical care was necessary to treat or prevent imminent or life-threatening deterioration of the following conditions: respiratory distress, rapid atrial fibrillation.    Critical care was time spent personally by me on the following activities:  Obtaining history from patient or surrogate, development of treatment plan with patient or surrogate, examination of patient, evaluation of patient's response to treatment, re-evaluation of patient's condition, ordering and review of radiographic studies, ordering and performing treatments and interventions, discussions with consultants, review of old charts and ordering and review of laboratory studies (BiPAP, cardizem)    I assumed direction of critical care for this patient from another provider in my specialty: no

## 2024-06-29 NOTE — ASSESSMENT & PLAN NOTE
Wt Readings from Last 3 Encounters:   06/18/24 108 kg (238 lb 8.6 oz)   06/08/24 103 kg (228 lb)   02/19/24 113 kg (249 lb)     Patient has history of diastolic heart failure, echo 6/8/2024 showed ejection fraction of 65%  According to previous hospitalization her home regimen is Lasix 40 mg daily which was held in the setting of AMNADA and reduced p.o. intake.  No diuretic on nursing home paperwork  Initially requiring BiPAP, currently on room air  Chest x-ray with vascular congestion    Received Lasix 20 mg IV x 1  Will start Lasix 40 mg IV twice daily  Monitor daily weights, intake and output

## 2024-06-29 NOTE — ASSESSMENT & PLAN NOTE
Recent MRSA bacteremia with acute osteomyelitis of thoracic spine  Currently on daptomycin 8 mg/kg IV every 24 hours  No PICC line identified on the admission chest x-ray  Old PICC line was removed, right IJ was placed  Afebrile, no WBC, procalcitonin negative  Continue daptomycin  Check CBC, BMP and CPK tomorrow  ID consult

## 2024-06-29 NOTE — ED PROCEDURE NOTE
Procedure  Central Line    Date/Time: 6/29/2024 4:23 PM    Performed by: Chani Guerrero MD  Authorized by: Chani Guerrero MD    Patient location:  ED  Other Assisting Provider: Yes (comment) (Megan Alcala DO)    Consent:     Consent obtained:  Emergent situation (patient unable to consent. attempted to contact daughter Rhoda, who is POA, multiple times without answer and unable to leave a voicemail.)  Universal protocol:     Patient identity confirmed:  Hospital-assigned identification number  Pre-procedure details:     Hand hygiene: Hand hygiene performed prior to insertion      Sterile barrier technique: All elements of maximal sterile technique followed      Skin preparation:  2% chlorhexidine    Skin preparation agent: Skin preparation agent completely dried prior to procedure    Indications:     Central line indications: no peripheral vascular access    Anesthesia (see MAR for exact dosages):     Anesthesia method:  Local infiltration    Local anesthetic:  Lidocaine 1% w/o epi  Procedure details:     Location:  Right internal jugular    Vessel type: vein      Laterality:  Right    Approach: percutaneous technique used      Patient position:  Flat    Catheter type:  Triple lumen 16cm    Catheter size:  7 Fr    Landmarks identified: yes      Ultrasound guidance: yes      Ultrasound image availability:  Not saved    Sterile ultrasound techniques: Sterile gel and sterile probe covers were used      Manometry confirmation: yes      Number of attempts:  4    Successful placement: yes    Post-procedure details:     Post-procedure:  Dressing applied and line sutured    Assessment:  Blood return through all ports, free fluid flow, placement verified by x-ray and no pneumothorax on x-ray    Post-procedure complications: none      Patient tolerance of procedure:  Tolerated well, no immediate complications                   Chani Guerrero MD  06/30/24 1242

## 2024-06-29 NOTE — ED NOTES
Awaiting for confirmation from provider that it's ok to use central line prior to giving medications and obtaining 2 hour trop.      Milagro Dejesus RN  06/29/24 2359

## 2024-06-29 NOTE — ASSESSMENT & PLAN NOTE
Suspect in the setting of A-fib with RVR and heart failure  EKG without acute ischemic changes  Continue telemetry, cycle troponin

## 2024-06-29 NOTE — H&P
On license of [facility]  H&P  Name: Bhavna Al 73 y.o. female I MRN: [id number]  Unit/Bed#: ED-11 I Date of Admission: 6/29/2024   Date of Service: 6/29/2024 I Hospital Day: 0      Assessment & Plan   * Atrial fibrillation with RVR (HCC)  Assessment & Plan  Patient was admitted to [facility] 6/6/2024 to 6/18/2024 for MRSA bacteremia discharge to a rehab facility, presents back to the hospital with respiratory distress requiring BiPAP initially, found to be in A-fib with RVR which possible triggered CHF exacerbation  Initially on presentation heart rate in 150s, received a dose of Cardizem bolus currently heart rate in 100s  Restart metoprolol tartrate 25 mg twice daily  Monitor and consider starting Cardizem drip  Continue Eliquis 5 mg twice daily  Cardiology consult    Acute on chronic diastolic heart failure (HCC)  Assessment & Plan  Wt Readings from Last 3 Encounters:   06/18/24 108 kg (238 lb 8.6 oz)   06/08/24 103 kg (228 lb)   02/19/24 113 kg (249 lb)     Patient has history of diastolic heart failure, echo 6/8/2024 showed ejection fraction of 65%  According to previous hospitalization her home regimen is Lasix 40 mg daily which was held in the setting of AMANDA and reduced p.o. intake.  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  Currently on daptomycin 8 mg/kg IV every 24 hours  No PICC line identified on the admission chest x-ray  Old PICC line was removed, right IJ was placed  Afebrile, no WBC, procalcitonin negative  Continue daptomycin  Check CBC, BMP and CPK tomorrow  ID consult    Pressure ulcer of left buttock, stage 3 (HCC)  Assessment & Plan  Present on admission  Wound  care    Secondary adrenal insufficiency (HCC)  Assessment & Plan  Secondary in the setting of chronic prednisone  Continue prednisone 5 mg twice daily    Dysphagia  Assessment & Plan  On previous admission on dental soft per speech recommendation    Elevated troponin  Assessment & Plan  Suspect in the setting of A-fib with RVR and heart failure  EKG without acute ischemic changes  Continue telemetry, cycle troponin    Rheumatoid arthritis (HCC)  Assessment & Plan  Continue Plaquenil 200 mg twice daily and prednisone 5 mg twice daily         VTE Pharmacologic Prophylaxis: VTE Score: 6 High Risk (Score >/= 5) - Pharmacological DVT Prophylaxis Ordered: apixaban (Eliquis). Sequential Compression Devices Ordered.  Code Status: Level 1 - Full Code   Discussion with family: Updated  (daughter) via phone.    Anticipated Length of Stay: Patient will be admitted on an inpatient basis with an anticipated length of stay of greater than 2 midnights secondary to A-fib with RVR, heart failure.    Total Time Spent on Date of Encounter in care of patient: 60 mins. This time was spent on one or more of the following: performing physical exam; counseling and coordination of care; obtaining or reviewing history; documenting in the medical record; reviewing/ordering tests, medications or procedures; communicating with other healthcare professionals and discussing with patient's family/caregivers.    Chief Complaint: Respiratory distress    History of Present Illness:  Bhavna Al is a 73 y.o. female with a PMH of as above who presents with respiratory distress.  Patient was admitted from 6/6-24 to 6/18/2024 to [facility] for MRSA bacteremia, AMANDA.  Placed on daptomycin and discharged to rehab.  Due to poor oral intake and AMANDA Home Lasix was discontinued, according to previous hospitalization Home regimen is Lasix 40 mg daily.  Patient presents with respiratory distress, hypoxic in the 80s at rehab.  Required BiPAP  and route to the hospital and initially in the ED, then BiPAP was discontinued and patient is saturating on room air.  Found to be in A-fib with RVR.  Patient is poor historian    Review of Systems:  Review of Systems   Constitutional:  Positive for activity change and appetite change. Negative for chills and fever.   HENT: Negative.     Respiratory:  Negative for shortness of breath.    Cardiovascular:  Positive for leg swelling. Negative for chest pain and palpitations.   Gastrointestinal:  Negative for abdominal pain.   Genitourinary:  Negative for dysuria.   Musculoskeletal: Negative.    Neurological: Negative.    Hematological: Negative.    Psychiatric/Behavioral: Negative.         Past Medical and Surgical History:   Past Medical History:   Diagnosis Date    Abnormal thyroid function test     last assessed: 2015     Arthritis     Caries     last assessed: 2016     Continuous opioid dependence (HCC) 2021    Edema of right lower extremity     last assessed: 2015     GERD (gastroesophageal reflux disease)     Hypertension     Medicare annual wellness visit, subsequent 2021    Positive blood culture 3/11/2022    Sarcoid        Past Surgical History:   Procedure Laterality Date     SECTION      IR NON-TUNNELED CENTRAL LINE PLACEMENT  2024    IR PICC PLACEMENT SINGLE LUMEN  2024    IR PICC PLACEMENT SINGLE LUMEN  2024    MULTIPLE TOOTH EXTRACTIONS N/A 2016    Procedure: Surgical extraction of teeth 2, 18, 19, 30, 31; incision and drainage of left subperiosteal abscess ;  Surgeon: Clara Cevallos DMD;  Location: [facility];  Service:        Meds/Allergies:  Prior to Admission medications    Medication Sig Start Date End Date Taking? Authorizing Provider   acetaminophen (TYLENOL) 325 mg tablet Take 2 tablets (650 mg total) by mouth every 4 (four) hours as needed for mild pain, headaches or fever. 16   AZIZA Hyatt   albuterol (Ventolin  HFA) 90 mcg/act inhaler Inhale 2 puffs every 6 (six) hours as needed for wheezing 11/16/22   Nya Taveras DO   alendronate (FOSAMAX) 70 mg tablet Take by mouth every 7 days     Historical Provider, MD   apixaban (ELIQUIS) 5 mg Take 1 tablet (5 mg total) by mouth 2 (two) times a day 11/10/22   Nya Taveras DO   bisacodyl (FLEET) 10 MG/30ML ENEM Insert 10 mg into the rectum once    Historical Provider, MD   cholecalciferol (VITAMIN D3) 1,000 units tablet Take 1 tablet (1,000 Units total) by mouth daily 11/10/22   Nya Taveras DO   clobetasol (TEMOVATE) 0.05 % cream Apply topically 2 (two) times a day 6/1/23   AZIZA Hyatt   DAPTOmycin (CUBICIN) 50 mL IVPB Inject 500 mg into a catheter in a vein over 30 minutes at 100 mL/hr every 24 hours 6/18/24 7/22/24  Jason Quach MD   hydroxychloroquine (PLAQUENIL) 200 mg tablet Take 1 tablet (200 mg total) by mouth 2 (two) times a day 11/10/22 6/27/24  Nya Taveras DO   lactulose (CHRONULAC) 10 g/15 mL solution Take 30 mL (20 g total) by mouth 3 (three) times a day 6/18/24   Jason Quach MD   lidocaine (LIDODERM) 5 % Apply 2 patches topically daily Remove & Discard patch within 12 hours or as directed by MD Do not start before December 20, 2022. 12/20/22   Ann-Marie Connell DO   metoprolol tartrate (LOPRESSOR) 25 mg tablet Take 1 tablet (25 mg total) by mouth every 12 (twelve) hours 11/10/22   Nya Taveras DO   miconazole 2 % cream Apply topically 2 (two) times a day 6/18/24   Jason Quach MD   nystatin (MYCOSTATIN) powder Apply topically 2 (two) times a day 11/25/22   Federico Piña MD   pantoprazole (PROTONIX) 40 mg tablet Take 1 tablet (40 mg total) by mouth daily in the early morning 6/19/24   Jason Quach MD   polyethylene glycol (MIRALAX) 17 g packet Take 17 g by mouth daily Do not start before November 26, 2022.  Patient not taking: Reported on 2/19/2024 11/26/22 5/27/23  Federico Piña MD   predniSONE 5 mg  tablet Take 1 tablet (5 mg total) by mouth 2 (two) times a day with meals Do not start before June 5, 2023. 6/5/23   AZIZA Hyatt   triamcinolone (KENALOG) 0.1 % cream Apply topically 2 (two) times a day Apply to BUE and BLE topically everyday and evening shift for psoriasis    Historical Provider, MD   Zinc 50 MG TABS Take 1 tablet by mouth in the morning    Historical Provider, MD     I have reveiwed home medications using records provided by [facility].    Allergies:   Allergies   Allergen Reactions    Shellfish-Derived Products - Food Allergy Itching    Methotrexate Derivatives     Amoxicillin Rash    Ampicillin-Sulbactam Sodium Rash       Social History:  Marital Status: Single     Substance Use History:   Social History     Substance and Sexual Activity   Alcohol Use Not Currently     Social History     Tobacco Use   Smoking Status Never   Smokeless Tobacco Never     Social History     Substance and Sexual Activity   Drug Use No       Family History:  Family History   Problem Relation Age of Onset    Asthma Mother     Stroke Mother     No Known Problems Father     No Known Problems Sister     No Known Problems Brother     No Known Problems Maternal Grandmother     No Known Problems Maternal Grandfather     No Known Problems Paternal Grandmother     No Known Problems Paternal Grandfather     Diabetes Maternal Aunt     Breast cancer Cousin     Diabetes Cousin     Breast cancer Other        Physical Exam:     Vitals:   Blood Pressure: 118/60 (06/29/24 1832)  Pulse: (!) 120 (06/29/24 1832)  Temperature: 99.7 °F (37.6 °C) (06/29/24 1507)  Temp Source: Oral (06/29/24 1507)  Respirations: (!) 24 (06/29/24 1730)  SpO2: 98 % (06/29/24 1730)    Physical Exam  Constitutional:       General: She is not in acute distress.     Comments: Chronically ill-appearing   Cardiovascular:      Rate and Rhythm: Tachycardia present. Rhythm irregular.      Heart sounds: No murmur heard.  Pulmonary:      Effort: Pulmonary effort is  normal. No respiratory distress.      Breath sounds: Normal breath sounds. No wheezing.   Abdominal:      General: Bowel sounds are normal. There is no distension.      Palpations: Abdomen is soft.      Tenderness: There is no abdominal tenderness.   Musculoskeletal:      Comments: Bilateral lower extremity 2+ pitting edema   Neurological:      General: No focal deficit present.      Mental Status: She is alert.   Psychiatric:         Mood and Affect: Mood normal.          Additional Data:     Lab Results:  Results from last 7 days   Lab Units 06/29/24  1450   WBC Thousand/uL 6.33   HEMOGLOBIN g/dL 11.7   HEMATOCRIT % 37.6   PLATELETS Thousands/uL 232   SEGS PCT % 82*   LYMPHO PCT % 13*   MONO PCT % 2*   EOS PCT % 2     Results from last 7 days   Lab Units 06/29/24  1450   SODIUM mmol/L 140   POTASSIUM mmol/L 4.9   CHLORIDE mmol/L 106   CO2 mmol/L 22   BUN mg/dL 10   CREATININE mg/dL 1.28   ANION GAP mmol/L 12   CALCIUM mg/dL 8.9   ALBUMIN g/dL 3.1*   TOTAL BILIRUBIN mg/dL 0.68   ALK PHOS U/L 60   ALT U/L 31   AST U/L 60*   GLUCOSE RANDOM mg/dL 79     Results from last 7 days   Lab Units 06/29/24  1450   INR  1.42*         Lab Results   Component Value Date    HGBA1C 5.1 11/22/2022     Results from last 7 days   Lab Units 06/29/24  1722 06/29/24  1450   LACTIC ACID mmol/L 2.1*  --    PROCALCITONIN ng/ml  --  0.23       Lines/Drains:  Invasive Devices       Peripherally Inserted Central Catheter Line  Duration             PICC Line 06/17/24 Right Basilic 12 days              Central Venous Catheter Line  Duration             CVC Central Lines 06/29/24 Triple <1 day                    Central Line:  Goal for removal: Will discontinue when meds requiring line are completed.           Imaging: Reviewed radiology reports from this admission including: chest xray  XR chest 1 view portable   ED Interpretation by Megan Alcala DO (06/29 1751)   Central line in place.  Increased interstitial markings consistent with  pulmonary edema.    Image independently interpreted by myself       XR chest portable   Final Result by Osvaldo Orourke MD (06/29 2015)      No PICC line identified.            Workstation performed: [id number]             EKG and Other Studies Reviewed on Admission:   EKG:  A-fib with RVR, nonspecific ST-T changes.    ** Please Note: This note has been constructed using a voice recognition system. **

## 2024-06-29 NOTE — ED PROVIDER NOTES
History  Chief Complaint   Patient presents with    Respiratory Distress     Patient here via EMS from [place] with complaints of respiratory distress.      Patient is a 72 yo female with pertinent PMH of CHF w/ EF of 65%, afib on eliquis, 2nd adrenal insufficiency, osteomyelitis of t-spine and MRSA bacteremia with PICC line for extended course of antibiotics (following with ID). She came to the ED today via EMS due to respiratory distress; was found to have SpO2 in the low 80's at her nursing home and required CPAP en route. BP was not able to be obtained by EMS and patient was lethargic on arrival but aroused to voice. Patient unable to provide history 2/2 mental status and no family members or nursing home staff present at bedside. Of note, patient was recently discharged from the hospital on 6/18.         Prior to Admission Medications   Prescriptions Last Dose Informant Patient Reported? Taking?   DAPTOmycin (CUBICIN) 50 mL IVPB   No No   Sig: Inject 500 mg into a catheter in a vein over 30 minutes at 100 mL/hr every 24 hours   Zinc 50 MG TABS   Yes No   Sig: Take 1 tablet by mouth in the morning   acetaminophen (TYLENOL) 325 mg tablet  Self, Outside Facility (Specify) No No   Sig: Take 2 tablets (650 mg total) by mouth every 4 (four) hours as needed for mild pain, headaches or fever.   albuterol (Ventolin HFA) 90 mcg/act inhaler  Outside Facility (Specify) No No   Sig: Inhale 2 puffs every 6 (six) hours as needed for wheezing   alendronate (FOSAMAX) 70 mg tablet  Self, Outside Facility (Specify) Yes No   Sig: Take by mouth every 7 days    apixaban (ELIQUIS) 5 mg  Outside Facility (Specify) No No   Sig: Take 1 tablet (5 mg total) by mouth 2 (two) times a day   bisacodyl (FLEET) 10 MG/30ML ENEM   Yes No   Sig: Insert 10 mg into the rectum once   cholecalciferol (VITAMIN D3) 1,000 units tablet  Outside Facility (Specify) No No   Sig: Take 1 tablet (1,000 Units total) by mouth daily   clobetasol (TEMOVATE) 0.05 %  cream   No No   Sig: Apply topically 2 (two) times a day   hydroxychloroquine (PLAQUENIL) 200 mg tablet  Outside Facility (Specify) No No   Sig: Take 1 tablet (200 mg total) by mouth 2 (two) times a day   lactulose (CHRONULAC) 10 g/15 mL solution   No No   Sig: Take 30 mL (20 g total) by mouth 3 (three) times a day   lidocaine (LIDODERM) 5 %   No No   Sig: Apply 2 patches topically daily Remove & Discard patch within 12 hours or as directed by MD Do not start before December 20, 2022.   metoprolol tartrate (LOPRESSOR) 25 mg tablet  Outside Facility (Specify) No No   Sig: Take 1 tablet (25 mg total) by mouth every 12 (twelve) hours   miconazole 2 % cream   No No   Sig: Apply topically 2 (two) times a day   nystatin (MYCOSTATIN) powder  Outside Facility (Specify) No No   Sig: Apply topically 2 (two) times a day   pantoprazole (PROTONIX) 40 mg tablet   No No   Sig: Take 1 tablet (40 mg total) by mouth daily in the early morning   polyethylene glycol (MIRALAX) 17 g packet  Outside Facility (Specify) No No   Sig: Take 17 g by mouth daily Do not start before November 26, 2022.   Patient not taking: Reported on 2/19/2024   predniSONE 5 mg tablet   No No   Sig: Take 1 tablet (5 mg total) by mouth 2 (two) times a day with meals Do not start before June 5, 2023.   triamcinolone (KENALOG) 0.1 % cream   Yes No   Sig: Apply topically 2 (two) times a day Apply to BUE and BLE topically everyday and evening shift for psoriasis      Facility-Administered Medications: None       Past Medical History:   Diagnosis Date    Abnormal thyroid function test     last assessed: Sept 25, 2015     Arthritis     Caries     last assessed: Sept 9, 2016     Continuous opioid dependence (HCC) 12/2/2021    Edema of right lower extremity     last assessed: March 18, 2015     GERD (gastroesophageal reflux disease)     Hypertension     Medicare annual wellness visit, subsequent 12/2/2021    Positive blood culture 3/11/2022    Sarcoid        Past  Surgical History:   Procedure Laterality Date     SECTION      IR NON-TUNNELED CENTRAL LINE PLACEMENT  2024    IR PICC PLACEMENT SINGLE LUMEN  2024    IR PICC PLACEMENT SINGLE LUMEN  2024    MULTIPLE TOOTH EXTRACTIONS N/A 2016    Procedure: Surgical extraction of teeth 2, 18, 19, 30, 31; incision and drainage of left subperiosteal abscess ;  Surgeon: Clara Cevallos DMD;  Location: [facility];  Service:        Family History   Problem Relation Age of Onset    Asthma Mother     Stroke Mother     No Known Problems Father     No Known Problems Sister     No Known Problems Brother     No Known Problems Maternal Grandmother     No Known Problems Maternal Grandfather     No Known Problems Paternal Grandmother     No Known Problems Paternal Grandfather     Diabetes Maternal Aunt     Breast cancer Cousin     Diabetes Cousin     Breast cancer Other      I have reviewed and agree with the history as documented.    E-Cigarette/Vaping    E-Cigarette Use Never User      E-Cigarette/Vaping Substances    Nicotine No     THC No     CBD No     Flavoring No     Other No     Unknown No      Social History     Tobacco Use    Smoking status: Never    Smokeless tobacco: Never   Vaping Use    Vaping status: Never Used   Substance Use Topics    Alcohol use: Not Currently    Drug use: No        Review of Systems   Unable to perform ROS: Severe respiratory distress       Physical Exam  ED Triage Vitals   Temperature Pulse Respirations Blood Pressure SpO2   24 1507 24 1404 24 1404 24 1404 24 1404   99.7 °F (37.6 °C) (!) 150 (!) 24 136/58 100 %      Temp Source Heart Rate Source Patient Position - Orthostatic VS BP Location FiO2 (%)   24 1507 24 1404 24 1404 24 1404 --   Oral Monitor Sitting Right leg       Pain Score       24 2231       No Pain             Orthostatic Vital Signs  Vitals:    24 0100 24 0359 24 0725 24 1141   BP:  100/54 123/56 111/54 118/52   Pulse:  84 79 82   Patient Position - Orthostatic VS:  Lying Lying Lying       Physical Exam  Constitutional:       General: She is in acute distress.      Appearance: She is obese. She is ill-appearing. She is not toxic-appearing or diaphoretic.   HENT:      Head: Normocephalic and atraumatic.      Nose: Nose normal.      Mouth/Throat:      Mouth: Mucous membranes are moist.   Eyes:      Extraocular Movements: Extraocular movements intact.      Conjunctiva/sclera: Conjunctivae normal.   Cardiovascular:      Rate and Rhythm: Tachycardia present. Rhythm irregular.      Heart sounds: Normal heart sounds.   Pulmonary:      Comments: Increased work of breathing, decreased basilar breath sounds R>L    Abdominal:      Tenderness: There is no abdominal tenderness.   Musculoskeletal:         General: Injury: 2+ pitting bilaterally.      Right lower leg: Edema present.      Left lower leg: Edema present.   Skin:     General: Skin is warm.      Comments: Diffuse exfoliation of skin 2/2 staph. Scalded skin    Neurological:      Mental Status: She is lethargic.      Cranial Nerves: No facial asymmetry.      Comments: Patient oriented to self only            ED Medications  Medications   acetaminophen (TYLENOL) tablet 650 mg (has no administration in time range)   albuterol (PROVENTIL HFA,VENTOLIN HFA) inhaler 2 puff (has no administration in time range)   apixaban (ELIQUIS) tablet 5 mg (5 mg Oral Given 6/30/24 1031)   hydroxychloroquine (PLAQUENIL) tablet 200 mg (200 mg Oral Given 6/30/24 0847)   metoprolol tartrate (LOPRESSOR) tablet 25 mg (25 mg Oral Given 6/30/24 0847)   pantoprazole (PROTONIX) EC tablet 40 mg (40 mg Oral Given 6/30/24 0604)   predniSONE tablet 5 mg (5 mg Oral Given 6/30/24 0847)   ammonium lactate (LAC-HYDRIN) 12 % cream (has no administration in time range)   DAPTOmycin (CUBICIN) 600 mg in sodium chloride 0.9 % 50 mL IVPB (has no administration in time range)   albuterol (FOR  EMS ONLY) (2.5 mg/3 mL) 0.083 % inhalation solution 2.5 mg ( Does not apply Given to EMS 6/29/24 1420)   diltiazem (CARDIZEM) injection 10 mg (10 mg Intravenous Given 6/29/24 1723)   furosemide (LASIX) injection 20 mg (20 mg Intravenous Given 6/29/24 1831)   albumin human (FLEXBUMIN) 25 % injection 12.5 g (0 g Intravenous Stopped 6/29/24 1957)       Diagnostic Studies  Results Reviewed       Procedure Component Value Units Date/Time    CBC (With Platelets) [[id number]]  (Abnormal) Collected: 06/30/24 0547    Lab Status: Final result Specimen: Blood from Central Venous Line Updated: 06/30/24 0830     WBC 6.68 Thousand/uL      RBC 3.10 Million/uL      Hemoglobin 8.5 g/dL      Hematocrit 28.1 %      MCV 91 fL      MCH 27.4 pg      MCHC 30.2 g/dL      RDW 16.0 %      Platelets 159 Thousands/uL      MPV 12.8 fL     Basic metabolic panel [[id number]]  (Abnormal) Collected: 06/30/24 0547    Lab Status: Final result Specimen: Blood from Central Venous Line Updated: 06/30/24 0615     Sodium 141 mmol/L      Potassium 3.8 mmol/L      Chloride 109 mmol/L      CO2 26 mmol/L      ANION GAP 6 mmol/L      BUN 12 mg/dL      Creatinine 1.70 mg/dL      Glucose 74 mg/dL      Calcium 8.2 mg/dL      eGFR 29 ml/min/1.73sq m     Narrative:      National Kid[employer] Foundation guidelines for Chronic Kidney Disease (CKD):     Stage 1 with normal or high GFR (GFR > 90 mL/min/1.73 square meters)    Stage 2 Mild CKD (GFR = 60-89 mL/min/1.73 square meters)    Stage 3A Moderate CKD (GFR = 45-59 mL/min/1.73 square meters)    Stage 3B Moderate CKD (GFR = 30-44 mL/min/1.73 square meters)    Stage 4 Severe CKD (GFR = 15-29 mL/min/1.73 square meters)    Stage 5 End Stage CKD (GFR <15 mL/min/1.73 square meters)  Note: GFR calculation is accurate only with a steady state creatinine    CK [[id number]]  (Abnormal) Collected: 06/30/24 0547    Lab Status: Final result Specimen: Blood from Central Venous Line Updated: 06/30/24 0615     Total  U/L      Blood culture #1 [[id number]] Collected: 06/29/24 1449    Lab Status: Preliminary result Specimen: Blood from Arm, Right Updated: 06/30/24 0001     Blood Culture Received in Microbiology Lab. Culture in Progress.    Blood culture #2 [[id number]] Collected: 06/29/24 1450    Lab Status: Preliminary result Specimen: Blood from Arm, Left Updated: 06/30/24 0001     Blood Culture Received in Microbiology Lab. Culture in Progress.    HS Troponin I 4hr [[id number]]  (Abnormal) Collected: 06/29/24 1912    Lab Status: Final result Specimen: Blood from Central Venous Line Updated: 06/29/24 2008     hs TnI 4hr 364 ng/L      Delta 4hr hsTnI 84 ng/L     HS Troponin I 2hr [[id number]]  (Abnormal) Collected: 06/29/24 1722    Lab Status: Final result Specimen: Blood from Central Venous Line Updated: 06/29/24 1758     hs TnI 2hr 323 ng/L      Delta 2hr hsTnI 43 ng/L     Lactic acid [[id number]]  (Abnormal) Collected: 06/29/24 1722    Lab Status: Final result Specimen: Blood from Central Venous Line Updated: 06/29/24 1754     LACTIC ACID 2.1 mmol/L     Narrative:      Result may be elevated if tourniquet was used during collection.    Blood gas, Venous [[id number]]  (Abnormal) Collected: 06/29/24 1722    Lab Status: Final result Specimen: Blood from Line, Venous Updated: 06/29/24 1736     pH, Kevin 7.459     pCO2, Kevin 34.9 mm Hg      pO2, Kevin 42.6 mm Hg      HCO3, Kevin 24.2 mmol/L      Base Excess, Kevin 0.7 mmol/L      O2 Content, Kevin 13.1 ml/dL      O2 HGB, VENOUS 79.0 %     B-Type Natriuretic Peptide(BNP) [[id number]]  (Abnormal) Collected: 06/29/24 1450    Lab Status: Final result Specimen: Blood from Arm, Left Updated: 06/29/24 1539      pg/mL     Procalcitonin [[id number]]  (Normal) Collected: 06/29/24 1450    Lab Status: Final result Specimen: Blood from Arm, Left Updated: 06/29/24 1534     Procalcitonin 0.23 ng/ml     Protime-INR [[id number]]  (Abnormal) Collected: 06/29/24 1450    Lab Status: Final result Specimen: Blood from Arm,  Left Updated: 06/29/24 1533     Protime 17.6 seconds      INR 1.42    APTT [[id number]]  (Abnormal) Collected: 06/29/24 1450    Lab Status: Final result Specimen: Blood from Arm, Left Updated: 06/29/24 1533     PTT 22 seconds     HS Troponin 0hr (reflex protocol) [[id number]]  (Abnormal) Collected: 06/29/24 1450    Lab Status: Final result Specimen: Blood from Arm, Left Updated: 06/29/24 1531     hs TnI 0hr 280 ng/L     Comprehensive metabolic panel [[id number]]  (Abnormal) Collected: 06/29/24 1450    Lab Status: Final result Specimen: Blood from Arm, Left Updated: 06/29/24 1526     Sodium 140 mmol/L      Potassium 4.9 mmol/L      Chloride 106 mmol/L      CO2 22 mmol/L      ANION GAP 12 mmol/L      BUN 10 mg/dL      Creatinine 1.28 mg/dL      Glucose 79 mg/dL      Calcium 8.9 mg/dL      Corrected Calcium 9.6 mg/dL      AST 60 U/L      ALT 31 U/L      Alkaline Phosphatase 60 U/L      Total Protein 6.7 g/dL      Albumin 3.1 g/dL      Total Bilirubin 0.68 mg/dL      eGFR 40 ml/min/1.73sq m     Narrative:      National Kidney Disease Foundation guidelines for Chronic Kidney Disease (CKD):     Stage 1 with normal or high GFR (GFR > 90 mL/min/1.73 square meters)    Stage 2 Mild CKD (GFR = 60-89 mL/min/1.73 square meters)    Stage 3A Moderate CKD (GFR = 45-59 mL/min/1.73 square meters)    Stage 3B Moderate CKD (GFR = 30-44 mL/min/1.73 square meters)    Stage 4 Severe CKD (GFR = 15-29 mL/min/1.73 square meters)    Stage 5 End Stage CKD (GFR <15 mL/min/1.73 square meters)  Note: GFR calculation is accurate only with a steady state creatinine    CBC and differential [[id number]]  (Abnormal) Collected: 06/29/24 1450    Lab Status: Final result Specimen: Blood from Arm, Left Updated: 06/29/24 1505     WBC 6.33 Thousand/uL      RBC 4.20 Million/uL      Hemoglobin 11.7 g/dL      Hematocrit 37.6 %      MCV 90 fL      MCH 27.9 pg      MCHC 31.1 g/dL      RDW 16.1 %      MPV 12.5 fL      Platelets 232 Thousands/uL      nRBC 0 /100 WBCs       Segmented % 82 %      Immature Grans % 1 %      Lymphocytes % 13 %      Monocytes % 2 %      Eosinophils Relative 2 %      Basophils Relative 0 %      Absolute Neutrophils 5.26 Thousands/µL      Absolute Immature Grans 0.03 Thousand/uL      Absolute Lymphocytes 0.79 Thousands/µL      Absolute Monocytes 0.13 Thousand/µL      Eosinophils Absolute 0.10 Thousand/µL      Basophils Absolute 0.02 Thousands/µL                    XR chest 1 view portable   ED Interpretation by Megan Alcala DO (06/29 7661)   Central line in place.  Increased interstitial markings consistent with pulmonary edema.    Image independently interpreted by myself       XR chest portable   Final Result by Osvaldo Orourke MD (06/29 3787)      No PICC line identified.            Workstation performed: [id number]               Procedures  US Guided Peripheral IV    Date/Time: 6/29/2024 2:20 PM    Performed by: Chani Guerrero MD  Authorized by: Chani Guerrero MD    Patient location:  ED  Performed by:  Resident  Other Assisting Provider: No    Indications:     Indications: difficulty obtaining IV access      Image availability:  Not saved  Procedure details:     Location: attempted placement in bilateral forearms.    Catheter size:  20 gauge    Number of attempts:  5 or more    Successful placement: no    Post-procedure details:     Post-procedure complications: local hematoma      Patient tolerance of procedure:  Tolerated well, no immediate complications  ECG 12 Lead Documentation Only    Date/Time: 6/29/2024 2:09 PM    Performed by: Chani Guerrero MD  Authorized by: Chani Guerrero MD    Indications / Diagnosis:  Tachycardia  ECG reviewed by me, the ED Provider: yes    Patient location:  ED  Previous ECG:     Previous ECG:  Compared to current    Similarity:  No change  Interpretation:     Interpretation: abnormal    Rate:     ECG rate:  134    ECG rate assessment: tachycardic    Rhythm:     Rhythm: atrial fibrillation     Ectopy:     Ectopy: none    QRS:     QRS axis:  Normal    QRS intervals:  Normal  Conduction:     Conduction: normal    ST segments:     ST segments:  Non-specific  T waves:     T waves: non-specific          ED Course                             SBIRT 20yo+      Flowsheet Row Most Recent Value   Initial Alcohol Screen: US AUDIT-C     1. How often do you have a drink containing alcohol? 0 Filed at: 06/29/2024 1409   2. How many drinks containing alcohol do you have on a typical day you are drinking?  0 Filed at: 06/29/2024 1409   3a. Male UNDER 65: How often do you have five or more drinks on one occasion? 0 Filed at: 06/29/2024 1409   3b. FEMALE Any Age, or MALE 65+: How often do you have 4 or more drinks on one occassion? 0 Filed at: 06/29/2024 1409   Audit-C Score 0 Filed at: 06/29/2024 1409   RADHA: How many times in the past year have you...    Used an illegal drug or used a prescription medication for non-medical reasons? Never Filed at: 06/29/2024 1402                  Medical Decision Making  Patient is a 72 yo female who presents for evaluation of respiratory distress    Vital signs: afebrile, tachycardia 2/2 a fib with RVR. Difficulty obtaining BP's. SBP's ranging from      Ddx: acute on chronic CHF 2/2 atrial fibrillation vs. Pulmonary pathology     Plan:   On arrival, patient placed on bipap but removed after 10 minutes because patient nearly vomited.   Has since been maintaining sats in 90's on RA.  Initial CXR showing pulmonary congestion, right PICC not visualized   Right IJ placed and confirmed with CXR   Workup as above, so far not concerning for infectious etiology. Likely CHF exacerbation secondary to afib   1x 10 mg cardizem given for attempted rate control    5:50 PM- daughter and granddaughter present at bedside. Discussed ED course and explained patient will be admitted for further management. They shared that nursing home contacted them 2 days ago to inform them that PICC was not  longer working and would need to be replaced. Unsure if it was replaced PTA. It is possible that patient missed 2x daptomycin.   PICC line pulled at bedside by myself   Daptomycin ordered   SBP in 110's after 1x cardizem. Will hold off on further doses for now.     Disposition: Discussed with SLIM and ICU attendings. Admit SD2.     Amount and/or Complexity of Data Reviewed  Labs: ordered.  Radiology: ordered and independent interpretation performed.    Risk  Prescription drug management.  Decision regarding hospitalization.          Disposition  Final diagnoses:   CHF (congestive heart failure) (HCC)   Atrial fibrillation with RVR (HCC)     Time reflects when diagnosis was documented in both MDM as applicable and the Disposition within this note       Time User Action Codes Description Comment    6/29/2024  5:24 PM Chani Guerrero [I50.9] CHF (congestive heart failure) (HCC)     6/29/2024  5:25 PM Chani Guerrero Add [I48.91] Atrial fibrillation with RVR (HCC)     6/29/2024  6:12 PM Shania Velarde Add [R78.81,  B95.62] MRSA bacteremia           ED Disposition       ED Disposition   Admit    Condition   Stable    Date/Time   Sat Jun 29, 2024 6751    Comment   Case was discussed with SLIM and CC and the patient's admission status was agreed to be Admission Status: inpatient status to the service of Dr. Paige- Sayed .               Follow-up Information    None         Current Discharge Medication List        CONTINUE these medications which have NOT CHANGED    Details   acetaminophen (TYLENOL) 325 mg tablet Take 2 tablets (650 mg total) by mouth every 4 (four) hours as needed for mild pain, headaches or fever.  Qty: 30 tablet, Refills: 0      albuterol (Ventolin HFA) 90 mcg/act inhaler Inhale 2 puffs every 6 (six) hours as needed for wheezing  Qty: 18 g, Refills: 0    Comments: Substitution to a formulary equivalent within the same pharmaceutical class is authorized.  Associated Diagnoses: Acute  bronchitis, unspecified organism      alendronate (FOSAMAX) 70 mg tablet Take by mouth every 7 days       apixaban (ELIQUIS) 5 mg Take 1 tablet (5 mg total) by mouth 2 (two) times a day  Qty: 60 tablet, Refills: 0    Comments: Pt to schedule follow up visit  Associated Diagnoses: New onset a-fib (HCC)      bisacodyl (FLEET) 10 MG/30ML ENEM Insert 10 mg into the rectum once      cholecalciferol (VITAMIN D3) 1,000 units tablet Take 1 tablet (1,000 Units total) by mouth daily  Qty: 90 tablet, Refills: 0    Associated Diagnoses: Vitamin D deficiency      clobetasol (TEMOVATE) 0.05 % cream Apply topically 2 (two) times a day  Qty: 60 g, Refills: 0    Associated Diagnoses: Pustular psoriasis      DAPTOmycin (CUBICIN) 50 mL IVPB Inject 500 mg into a catheter in a vein over 30 minutes at 100 mL/hr every 24 hours    Associated Diagnoses: MRSA bacteremia      hydroxychloroquine (PLAQUENIL) 200 mg tablet Take 1 tablet (200 mg total) by mouth 2 (two) times a day  Qty: 60 tablet, Refills: 0    Associated Diagnoses: Rheumatoid arthritis involving multiple sites, unspecified whether rheumatoid factor present (HCC)      lactulose (CHRONULAC) 10 g/15 mL solution Take 30 mL (20 g total) by mouth 3 (three) times a day    Associated Diagnoses: Constipation      lidocaine (LIDODERM) 5 % Apply 2 patches topically daily Remove & Discard patch within 12 hours or as directed by MD Do not start before December 20, 2022.  Refills: 0    Associated Diagnoses: Ambulatory dysfunction      metoprolol tartrate (LOPRESSOR) 25 mg tablet Take 1 tablet (25 mg total) by mouth every 12 (twelve) hours  Qty: 180 tablet, Refills: 0    Associated Diagnoses: Paroxysmal atrial fibrillation (HCC)      miconazole 2 % cream Apply topically 2 (two) times a day    Associated Diagnoses: Candidiasis      nystatin (MYCOSTATIN) powder Apply topically 2 (two) times a day  Qty: 15 g, Refills: 0    Associated Diagnoses: Morbid obesity (HCC)      pantoprazole (PROTONIX) 40  mg tablet Take 1 tablet (40 mg total) by mouth daily in the early morning    Associated Diagnoses: Gastroesophageal reflux disease without esophagitis      polyethylene glycol (MIRALAX) 17 g packet Take 17 g by mouth daily Do not start before November 26, 2022.  Qty: 510 g, Refills: 0    Associated Diagnoses: Constipation      predniSONE 5 mg tablet Take 1 tablet (5 mg total) by mouth 2 (two) times a day with meals Do not start before June 5, 2023.  Qty: 60 tablet, Refills: 0    Associated Diagnoses: Pustular psoriasis      triamcinolone (KENALOG) 0.1 % cream Apply topically 2 (two) times a day Apply to BUE and BLE topically everyday and evening shift for psoriasis      Zinc 50 MG TABS Take 1 tablet by mouth in the morning           No discharge procedures on file.    PDMP Review         Value Time User    PDMP Reviewed  Yes 5/26/2023 11:45 PM Kb Sethi DO             ED Provider  Attending physically available and evaluated Bhavna Al. I managed the patient along with the ED Attending.    Electronically Signed by           Chani Guerrero MD  06/29/24 0977       Chani Guerrero MD  06/30/24 6104

## 2024-06-29 NOTE — ASSESSMENT & PLAN NOTE
Patient was admitted to [facility] 6/6/2024 to 6/18/2024 for MRSA bacteremia discharge to a rehab facility, presents back to the hospital with respiratory distress requiring BiPAP initially, found to be in A-fib with RVR which possible triggered CHF exacerbation  Initially on presentation heart rate in 150s, received a dose of Cardizem bolus currently heart rate in 100s  Restart metoprolol tartrate 25 mg twice daily  Monitor and consider starting Cardizem drip  Continue Eliquis 5 mg twice daily  Cardiology consult

## 2024-06-29 NOTE — ED NOTES
Per ED provider, ok to use PICC line since peripheral IV access unable to be obtained after several attempts. PICC line flushes easily but does not pull back blood. ED provider made aware.      Milagro Dejesus RN  06/29/24 1759

## 2024-06-29 NOTE — ED NOTES
Update provided to granddaughter, Devorah via phone at this time.      Milagro Dejesus RN  06/29/24 7160

## 2024-06-30 LAB
ANION GAP SERPL CALCULATED.3IONS-SCNC: 6 MMOL/L (ref 4–13)
BUN SERPL-MCNC: 12 MG/DL (ref 5–25)
CALCIUM SERPL-MCNC: 8.2 MG/DL (ref 8.4–10.2)
CHLORIDE SERPL-SCNC: 109 MMOL/L (ref 96–108)
CK SERPL-CCNC: 325 U/L (ref 26–192)
CO2 SERPL-SCNC: 26 MMOL/L (ref 21–32)
CREAT SERPL-MCNC: 1.7 MG/DL (ref 0.6–1.3)
ERYTHROCYTE [DISTWIDTH] IN BLOOD BY AUTOMATED COUNT: 16 % (ref 11.6–15.1)
GFR SERPL CREATININE-BSD FRML MDRD: 29 ML/MIN/1.73SQ M
GLUCOSE SERPL-MCNC: 74 MG/DL (ref 65–140)
HCT VFR BLD AUTO: 28.1 % (ref 34.8–46.1)
HCT VFR BLD AUTO: 28.9 % (ref 34.8–46.1)
HGB BLD-MCNC: 8.5 G/DL (ref 11.5–15.4)
HGB BLD-MCNC: 9 G/DL (ref 11.5–15.4)
MCH RBC QN AUTO: 27.4 PG (ref 26.8–34.3)
MCHC RBC AUTO-ENTMCNC: 30.2 G/DL (ref 31.4–37.4)
MCV RBC AUTO: 91 FL (ref 82–98)
PLATELET # BLD AUTO: 159 THOUSANDS/UL (ref 149–390)
PMV BLD AUTO: 12.8 FL (ref 8.9–12.7)
POTASSIUM SERPL-SCNC: 3.8 MMOL/L (ref 3.5–5.3)
RBC # BLD AUTO: 3.1 MILLION/UL (ref 3.81–5.12)
SODIUM SERPL-SCNC: 141 MMOL/L (ref 135–147)
WBC # BLD AUTO: 6.68 THOUSAND/UL (ref 4.31–10.16)

## 2024-06-30 PROCEDURE — 85027 COMPLETE CBC AUTOMATED: CPT | Performed by: INTERNAL MEDICINE

## 2024-06-30 PROCEDURE — 82550 ASSAY OF CK (CPK): CPT | Performed by: INTERNAL MEDICINE

## 2024-06-30 PROCEDURE — 99232 SBSQ HOSP IP/OBS MODERATE 35: CPT | Performed by: HOSPITALIST

## 2024-06-30 PROCEDURE — 80048 BASIC METABOLIC PNL TOTAL CA: CPT | Performed by: INTERNAL MEDICINE

## 2024-06-30 PROCEDURE — 99223 1ST HOSP IP/OBS HIGH 75: CPT | Performed by: INTERNAL MEDICINE

## 2024-06-30 PROCEDURE — 85014 HEMATOCRIT: CPT | Performed by: HOSPITALIST

## 2024-06-30 PROCEDURE — 85018 HEMOGLOBIN: CPT | Performed by: HOSPITALIST

## 2024-06-30 RX ORDER — AMMONIUM LACTATE 12 G/100G
CREAM TOPICAL 2 TIMES DAILY PRN
Status: DISCONTINUED | OUTPATIENT
Start: 2024-06-30 | End: 2024-07-01

## 2024-06-30 RX ADMIN — APIXABAN 5 MG: 5 TABLET, FILM COATED ORAL at 10:31

## 2024-06-30 RX ADMIN — HYDROXYCHLOROQUINE SULFATE 200 MG: 200 TABLET ORAL at 08:47

## 2024-06-30 RX ADMIN — PREDNISONE 5 MG: 5 TABLET ORAL at 15:54

## 2024-06-30 RX ADMIN — ACETAMINOPHEN 650 MG: 325 TABLET, FILM COATED ORAL at 14:15

## 2024-06-30 RX ADMIN — METOPROLOL TARTRATE 25 MG: 25 TABLET, FILM COATED ORAL at 08:47

## 2024-06-30 RX ADMIN — Medication 1 APPLICATION: at 14:18

## 2024-06-30 RX ADMIN — HYDROXYCHLOROQUINE SULFATE 200 MG: 200 TABLET ORAL at 17:40

## 2024-06-30 RX ADMIN — PREDNISONE 5 MG: 5 TABLET ORAL at 08:47

## 2024-06-30 RX ADMIN — METOPROLOL TARTRATE 25 MG: 25 TABLET, FILM COATED ORAL at 20:36

## 2024-06-30 RX ADMIN — ACETAMINOPHEN 650 MG: 325 TABLET, FILM COATED ORAL at 20:41

## 2024-06-30 RX ADMIN — PANTOPRAZOLE SODIUM 40 MG: 40 TABLET, DELAYED RELEASE ORAL at 06:04

## 2024-06-30 RX ADMIN — APIXABAN 5 MG: 5 TABLET, FILM COATED ORAL at 20:36

## 2024-06-30 NOTE — PROGRESS NOTES
[facility]  Progress Note  Name: Bhavna Al I  MRN: [id number]  Unit/Bed#: E4 -01 I Date of Admission: 6/29/2024   Date of Service: 6/30/2024 I Hospital Day: 1    Assessment & Plan   * Atrial fibrillation with RVR (HCC)  Assessment & Plan  Patient was admitted to [facility] 6/6/2024 to 6/18/2024 for MRSA bacteremia discharge to a rehab facility, presents back to the hospital with respiratory distress requiring BiPAP initially, found to be in A-fib with RVR which possible triggered CHF exacerbation  Initially on presentation heart rate in 150s, received a dose of Cardizem bolus currently heart rate in 100s      She is currently on metoprolol and has been controlled today.  Cardiology to evaluate her  She is on Eliquis    Pressure ulcer of left buttock, stage 3 (HCC)  Assessment & Plan  On admission.  Continue wound care.    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    Infectious disease following.    Acute on chronic diastolic heart failure (HCC)  Assessment & Plan  Wt Readings from Last 3 Encounters:   06/30/24 102 kg (224 lb 3.3 oz)   06/18/24 108 kg (238 lb 8.6 oz)   06/08/24 103 kg (228 lb)     Patient has history of diastolic heart failure, echo 6/8/2024 showed ejection fraction of 65%  According to previous hospitalization her home regimen is Lasix 40 mg daily which was held in the setting of AMANDA and reduced p.o. intake.  No diuretic on nursing home paperwork  Initially requiring BiPAP, currently on room air  Chest x-ray with vascular congestion        Doing well with IV Lasix.  No longer having shortness of breath.    Will defer diuretics to cardiology                     Subjective:   Feels much better  No SOB  No  palpitatitons          Objective:     Vitals:   Temp (24hrs), Av.7 °F (36.5 °C), Min:96.9 °F (36.1 °C), Max:99 °F (37.2 °C)    Temp:  [96.9 °F (36.1 °C)-99 °F (37.2 °C)] 96.9 °F (36.1 °C)  HR:  [] 82  Resp:  [18-42] 20  BP: ()/(22-84) 141/83  SpO2:  [90 %-100 %] 100 %  Body mass index is 42.36 kg/m².     Input and Output Summary (last 24 hours):       Intake/Output Summary (Last 24 hours) at 2024 1758  Last data filed at 2024 1747  Gross per 24 hour   Intake 760 ml   Output 350 ml   Net 410 ml       Physical Exam:     Physical Exam  Vitals and nursing note reviewed.   HENT:      Head: Normocephalic and atraumatic.   Eyes:      Pupils: Pupils are equal, round, and reactive to light.   Cardiovascular:      Rate and Rhythm: Normal rate and regular rhythm.      Heart sounds: No murmur heard.     No friction rub. No gallop.   Pulmonary:      Effort: Pulmonary effort is normal.      Breath sounds: Normal breath sounds. No wheezing or rales.   Abdominal:      General: Bowel sounds are normal.      Palpations: Abdomen is soft.      Tenderness: There is no abdominal tenderness.   Musculoskeletal:      Right lower leg: No edema.      Left lower leg: No edema.       .       Additional Data:     Labs:    Results from last 7 days   Lab Units 24  0917 24  0547 24  1450   WBC Thousand/uL  --  6.68 6.33   HEMOGLOBIN g/dL 9.0* 8.5* 11.7   HEMATOCRIT % 28.9* 28.1* 37.6   PLATELETS Thousands/uL  --  159 232   SEGS PCT %  --   --  82*   LYMPHO PCT %  --   --  13*   MONO PCT %  --   --  2*   EOS PCT %  --   --  2     Results from last 7 days   Lab Units 24  0547 24  1450   POTASSIUM mmol/L 3.8 4.9   CHLORIDE mmol/L 109* 106   CO2 mmol/L 26 22   BUN mg/dL 12 10   CREATININE mg/dL 1.70* 1.28   CALCIUM mg/dL 8.2* 8.9   ALK PHOS U/L  --  60   ALT U/L  --  31   AST U/L  --  60*     Results from last 7 days   Lab Units 24  1450   INR  1.42*                   * I Have Reviewed All  Lab Data     Recent Cultures (last 7 days):     Results from last 7 days   Lab Units 06/29/24  1450 06/29/24  1449   BLOOD CULTURE  Received in Microbiology Lab. Culture in Progress. Received in Microbiology Lab. Culture in Progress.         Last 24 Hours Medication List:   Current Facility-Administered Medications   Medication Dose Route Frequency Provider Last Rate    acetaminophen  650 mg Oral Q4H PRN Shania Velarde MD      albuterol  2 puff Inhalation Q6H PRN Shania Velarde MD      ammonium lactate   Topical BID PRN Dawood Lambert PA-C      apixaban  5 mg Oral BID Shania Velarde MD      [START ON 7/1/2024] DAPTOmycin  8 mg/kg (Adjusted) Intravenous Q48H Debra Madden MD      hydroxychloroquine  200 mg Oral BID Shania Velarde MD      metoprolol tartrate  25 mg Oral Q12H FAMILIA Shania Velarde MD      pantoprazole  40 mg Oral Early Morning Shania Velarde MD      predniSONE  5 mg Oral BID With Meals Shania Velarde MD           VTE Pharmacologic Prophylaxis:   Pharmacologic: Apixaban (Eliquis)      Current Length of Stay: 1 day(s)    Current Patient Status: Inpatient       Discharge Plan: currently from [place].  Can likely return back there in a few days.    Code Status: Level 1 - Full Code           Today, Patient Was Seen By: Jah Burkett DO    ** Please Note: Dictation voice to text software may have been used in the creation of this document. **

## 2024-06-30 NOTE — PLAN OF CARE
Problem: Potential for Falls  Goal: Patient will remain free of falls  Description: INTERVENTIONS:  - Educate patient/family on patient safety including physical limitations  - Instruct patient to call for assistance with activity   - Consult OT/PT to assist with strengthening/mobility   - Keep Call bell within reach  - Keep bed low and locked with side rails adjusted as appropriate  - Keep care items and personal belongings within reach  - Initiate and maintain comfort rounds  - Make Fall Risk Sign visible to staff  - Apply yellow socks and bracelet for high fall risk patients  - Consider moving patient to room near nurses station  Outcome: Progressing     Problem: Prexisting or High Potential for Compromised Skin Integrity  Goal: Skin integrity is maintained or improved  Description: INTERVENTIONS:  - Identify patients at risk for skin breakdown  - Assess and monitor skin integrity  - Assess and monitor nutrition and hydration status  - Monitor labs   - Assess for incontinence   - Turn and reposition patient  - Assist with mobility/ambulation  - Relieve pressure over bony prominences  - Avoid friction and shearing  - Provide appropriate hygiene as needed including keeping skin clean and dry  - Evaluate need for skin moisturizer/barrier cream  - Collaborate with interdisciplinary team   - Patient/family teaching  - Consider wound care consult   Outcome: Progressing     Problem: INFECTION - ADULT  Goal: Absence or prevention of progression during hospitalization  Description: INTERVENTIONS:  - Assess and monitor for signs and symptoms of infection  - Monitor lab/diagnostic results  - Monitor all insertion sites, i.e. indwelling lines, tubes, and drains  - Monitor endotracheal if appropriate and nasal secretions for changes in amount and color  - [place] appropriate cooling/warming therapies per order  - Administer medications as ordered  - Instruct and encourage patient and family to use good hand hygiene  technique  - Identify and instruct in appropriate isolation precautions for identified infection/condition  Outcome: Progressing     Problem: SAFETY ADULT  Goal: Patient will remain free of falls  Description: INTERVENTIONS:  - Educate patient/family on patient safety including physical limitations  - Instruct patient to call for assistance with activity   - Consult OT/PT to assist with strengthening/mobility   - Keep Call bell within reach  - Keep bed low and locked with side rails adjusted as appropriate  - Keep care items and personal belongings within reach  - Initiate and maintain comfort rounds  - Make Fall Risk Sign visible to staff  - Apply yellow socks and bracelet for high fall risk patients  - Consider moving patient to room near nurses station  Outcome: Progressing     Problem: DISCHARGE PLANNING  Goal: Discharge to home or other facility with appropriate resources  Description: INTERVENTIONS:  - Identify barriers to discharge w/patient and caregiver  - Arrange for needed discharge resources and transportation as appropriate  - Identify discharge learning needs (meds, wound care, etc.)  - Arrange for interpretive services to assist at discharge as needed  - Refer to Case Management Department for coordinating discharge planning if the patient needs post-hospital services based on physician/advanced practitioner order or complex needs related to functional status, cognitive ability, or social support system  Outcome: Progressing     Problem: Knowledge Deficit  Goal: Patient/family/caregiver demonstrates understanding of disease process, treatment plan, medications, and discharge instructions  Description: Complete learning assessment and assess knowledge base.  Interventions:  - Provide teaching at level of understanding  - Provide teaching via preferred learning methods  Outcome: Progressing     Problem: CARDIOVASCULAR - ADULT  Goal: Maintains optimal cardiac output and hemodynamic  stability  Description: INTERVENTIONS:  - Monitor I/O, vital signs and rhythm  - Monitor for S/S and trends of decreased cardiac output  - Administer and titrate ordered vasoactive medications to optimize hemodynamic stability  - Assess quality of pulses, skin color and temperature  - Assess for signs of decreased coronary artery perfusion  - Instruct patient to report change in severity of symptoms  Outcome: Progressing     Problem: RESPIRATORY - ADULT  Goal: Achieves optimal ventilation and oxygenation  Description: INTERVENTIONS:  - Assess for changes in respiratory status  - Assess for changes in mentation and behavior  - Position to facilitate oxygenation and minimize respiratory effort  - Oxygen administered by appropriate delivery if ordered  - Initiate smoking cessation education as indicated  - Encourage broncho-pulmonary hygiene including cough, deep breathe, Incentive Spirometry  - Assess the need for suctioning and aspirate as needed  - Assess and instruct to report SOB or any respiratory difficulty  - Respiratory Therapy support as indicated  Outcome: Progressing     Problem: SKIN/TISSUE INTEGRITY - ADULT  Goal: Skin Integrity remains intact(Skin Breakdown Prevention)  Description: Assess:  -Inspect skin when repositioning, toileting, and assisting with ADL  -Assess extremities for adequate circulation and sensation     Bed Management:  -Have minimal linens on bed & keep smooth, unwrinkled  -Change linens as needed when moist or perspiring  -Avoid sitting or lying in one position for more than 2 hours while in bed  -Keep HOB at 30degrees     Toileting:  -Offer bedside commode  -Assess for incontinence   -Use incontinent care products after each incontinent episode     Activity:  -Encourage activity and walks on unit  -Encourage or provide ROM exercises   -Turn and reposition patient   -Use appropriate equipment to lift or move patient in bed  -Instruct/ Assist with weight shifting  -Consider limitation  of chair time     Skin Care:  -Avoid use of baby powder, tape, friction and shearing, hot water or constrictive clothing  -Relieve pressure over bony prominences  -Do not massage red bony areas    Outcome: Progressing  Goal: Pressure injury heals and does not worsen  Description: Interventions:  - Implement low air loss mattress or specialty surface (Criteria met)  - Apply silicone foam dressing  - Consider nutrition services referral as needed  Outcome: Progressing

## 2024-06-30 NOTE — ASSESSMENT & PLAN NOTE
Wt Readings from Last 3 Encounters:   06/30/24 102 kg (224 lb 3.3 oz)   06/18/24 108 kg (238 lb 8.6 oz)   06/08/24 103 kg (228 lb)     Patient has history of diastolic heart failure, echo 6/8/2024 showed ejection fraction of 65%  According to previous hospitalization her home regimen is Lasix 40 mg daily which was held in the setting of AMANDA and reduced p.o. intake.  No diuretic on nursing home paperwork  Initially requiring BiPAP, currently on room air  Chest x-ray with vascular congestion        Doing well with IV Lasix.  No longer having shortness of breath.    Will defer diuretics to cardiology

## 2024-06-30 NOTE — CONSULTS
Cardiology Consultation  MD Osvaldo Reyes MD, FACC  Kostas Bruce DO, FACC Ather Mansoor, MD Rujul Patel, DO, [facility] HospitalCHAIM Rodriguez DO, FACC  ----------------------------------------------------------------  [address] [address]    Bhavna Al 73 y.o. female MRN: [id number]  Unit/Bed#: E4 -01 Encounter: [id number]      Reason for Consultation: AF/heart failure      ASSESSMENT:   Acute on chronic diastolic heart failure  LVEF 65%, probable diastolic dysfunction, severe LA dilatation, moderate MAC, trace MR/TR, June 2024  Acute hypoxic respiratory failure  Acute metabolic encephalopathy  Persistent atrial fibrillation on Eliquis  Nonischemic troponin elevation secondary to heart failure  Recent MRSA bacteremia secondary to osteomyelitis  Acute kidney injury  Hypertension  Morbid obesity  Rheumatoid arthritis  Secondary adrenal insufficiency  Anemia  GERD    PLAN:  Reviewing the patient's weights, she is at her lowest weight on record in the past 2 years  Pulmonary edema likely secondary to rapid atrial fibrillation  Hold on further IV diuresis for now and monitor for renal recovery  Patient given IV albumin expansion  Eliquis for thromboembolic prophylaxis  Continue metoprolol 25 mg every 12 hours  Can give IV digoxin overnight if the patient requires additional rate control with 0.125 mg and reach out to on-call team if still not controlled  If patient requires additional rate control despite the use of metoprolol we will consider oral digoxin going forward  Treatment of MRSA bacteremia/osteomyelitis as per ID/medicine  Eventual ischemic evaluation, inpatient versus outpatient  Supportive care    Signed: Kostas Bruce DO, FACC, FASE, FACP      History of Present Illness:  Bhavna Al is a 73 y.o. female with Diastolic heart failure, hypertension, obesity, rheumatoid arthritis, adrenal insufficiency, GERD presented with acute respiratory  distress while at rehab.  The patient was found to be hypoxic and was brought to [facility] for evaluation.  The patient was placed on BiPAP for hypoxia and was found to be in rapid atrial fibrillation.  Chest x-ray was obtained showing evidence of pulmonary edema.  The patient was given IV diuretic.  Patient subsequently developed hypotension.  Rapid response was called.  We were asked to see the patient regarding possible decompensation of heart failure.  She denies any chest pain, pressure, tightness or squeezing.  Denies lightheadedness, dizziness or palpitations.    Patient had a recent admission for discitis/vertebral osteomyelitis for which she was placed on antibiotics.      Review of Systems:  Review of Systems   Constitutional: Negative for decreased appetite, fever, weight gain and weight loss.   HENT:  Negative for congestion and sore throat.    Eyes:  Negative for visual disturbance.   Cardiovascular:  Positive for dyspnea on exertion. Negative for chest pain, leg swelling, near-syncope and palpitations.   Respiratory:  Positive for shortness of breath. Negative for cough.    Hematologic/Lymphatic: Negative for bleeding problem.   Skin:  Negative for rash.   Musculoskeletal:  Negative for myalgias and neck pain.   Gastrointestinal:  Negative for abdominal pain and nausea.   Neurological:  Negative for light-headedness and weakness.   Psychiatric/Behavioral:  Negative for depression.          Past Medical History:   Diagnosis Date    Abnormal thyroid function test     last assessed: Sept 25, 2015     Arthritis     Caries     last assessed: Sept 9, 2016     Continuous opioid dependence (HCC) 12/2/2021    Edema of right lower extremity     last assessed: March 18, 2015     GERD (gastroesophageal reflux disease)     Hypertension     Medicare annual wellness visit, subsequent 12/2/2021    Positive blood culture 3/11/2022    Sarcoid        Past Surgical History:   Procedure Laterality Date     " SECTION      IR NON-TUNNELED CENTRAL LINE PLACEMENT  2024    IR PICC PLACEMENT SINGLE LUMEN  2024    IR PICC PLACEMENT SINGLE LUMEN  2024    MULTIPLE TOOTH EXTRACTIONS N/A 2016    Procedure: Surgical extraction of teeth 2, 18, 19, 30, 31; incision and drainage of left subperiosteal abscess ;  Surgeon: Clara Cevallos DMD;  Location: [facility];  Service:        Social History     Socioeconomic History    Marital status: Single     Spouse name: None    Number of children: None    Years of education: None    Highest education level: None   Occupational History    Occupation: Retired: Worked for telephone company    Tobacco Use    Smoking status: Never    Smokeless tobacco: Never   Vaping Use    Vaping status: Never Used   Substance and Sexual Activity    Alcohol use: Not Currently    Drug use: No    Sexual activity: Not Currently   Other Topics Concern    None   Social History Narrative     noted in \"allscripts\"      Social Determinants of Health     Financial Resource Strain: Not on file   Food Insecurity: No Food Insecurity (2024)    Hunger Vital Sign     Worried About Running Out of Food in the Last Year: Never true     Ran Out of Food in the Last Year: Never true   Transportation Needs: No Transportation Needs (2024)    PRAPARE - Transportation     Lack of Transportation (Medical): No     Lack of Transportation (Non-Medical): No   Physical Activity: Not on file   Stress: Not on file   Social Connections: Not on file   Intimate Partner Violence: Not on file   Housing Stability: Low Risk  (2024)    Housing Stability Vital Sign     Unable to Pay for Housing in the Last Year: No     Number of Times Moved in the Last Year: 1     Homeless in the Last Year: No       Family History   Problem Relation Age of Onset    Asthma Mother     Stroke Mother     No Known Problems Father     No Known Problems Sister     No Known Problems Brother     No Known Problems Maternal " Grandmother     No Known Problems Maternal Grandfather     No Known Problems Paternal Grandmother     No Known Problems Paternal Grandfather     Diabetes Maternal Aunt     Breast cancer Cousin     Diabetes Cousin     Breast cancer Other        Allergies   Allergen Reactions    Shellfish-Derived Products - Food Allergy Itching    Methotrexate Derivatives     Amoxicillin Rash    Ampicillin-Sulbactam Sodium Rash       No current facility-administered medications on file prior to encounter.     Current Outpatient Medications on File Prior to Encounter   Medication Sig    acetaminophen (TYLENOL) 325 mg tablet Take 2 tablets (650 mg total) by mouth every 4 (four) hours as needed for mild pain, headaches or fever.    albuterol (Ventolin HFA) 90 mcg/act inhaler Inhale 2 puffs every 6 (six) hours as needed for wheezing    alendronate (FOSAMAX) 70 mg tablet Take by mouth every 7 days     apixaban (ELIQUIS) 5 mg Take 1 tablet (5 mg total) by mouth 2 (two) times a day    bisacodyl (FLEET) 10 MG/30ML ENEM Insert 10 mg into the rectum once    cholecalciferol (VITAMIN D3) 1,000 units tablet Take 1 tablet (1,000 Units total) by mouth daily    clobetasol (TEMOVATE) 0.05 % cream Apply topically 2 (two) times a day    DAPTOmycin (CUBICIN) 50 mL IVPB Inject 500 mg into a catheter in a vein over 30 minutes at 100 mL/hr every 24 hours    hydroxychloroquine (PLAQUENIL) 200 mg tablet Take 1 tablet (200 mg total) by mouth 2 (two) times a day    lactulose (CHRONULAC) 10 g/15 mL solution Take 30 mL (20 g total) by mouth 3 (three) times a day    lidocaine (LIDODERM) 5 % Apply 2 patches topically daily Remove & Discard patch within 12 hours or as directed by MD Do not start before December 20, 2022.    metoprolol tartrate (LOPRESSOR) 25 mg tablet Take 1 tablet (25 mg total) by mouth every 12 (twelve) hours    miconazole 2 % cream Apply topically 2 (two) times a day    nystatin (MYCOSTATIN) powder Apply topically 2 (two) times a day     pantoprazole (PROTONIX) 40 mg tablet Take 1 tablet (40 mg total) by mouth daily in the early morning    polyethylene glycol (MIRALAX) 17 g packet Take 17 g by mouth daily Do not start before November 26, 2022. (Patient not taking: Reported on 2/19/2024)    predniSONE 5 mg tablet Take 1 tablet (5 mg total) by mouth 2 (two) times a day with meals Do not start before June 5, 2023.    triamcinolone (KENALOG) 0.1 % cream Apply topically 2 (two) times a day Apply to BUE and BLE topically everyday and evening shift for psoriasis    Zinc 50 MG TABS Take 1 tablet by mouth in the morning        Current Facility-Administered Medications   Medication Dose Route Frequency Provider Last Rate    acetaminophen  650 mg Oral Q4H PRN Shania Velarde MD      albuterol  2 puff Inhalation Q6H PRN Shania Velarde MD      ammonium lactate   Topical BID PRN Dawood Lambert PA-C      apixaban  5 mg Oral BID Shania Velarde MD      [START ON 7/1/2024] DAPTOmycin  8 mg/kg (Adjusted) Intravenous Q48H Debra Madden MD      hydroxychloroquine  200 mg Oral BID Shania Velarde MD      metoprolol tartrate  25 mg Oral Q12H FAMILIA Shania Velarde MD      pantoprazole  40 mg Oral Early Morning Shania Velarde MD      predniSONE  5 mg Oral BID With Meals Shania Velarde MD              Vitals:    06/30/24 0534 06/30/24 0725 06/30/24 1141 06/30/24 1511   BP:  111/54 118/52 141/83   BP Location:  Right arm Right arm Right arm   Pulse:  79 82 82   Resp:  22 20 20   Temp:  97.6 °F (36.4 °C) (!) 97 °F (36.1 °C) (!) 96.9 °F (36.1 °C)   TempSrc:  Temporal Temporal Temporal   SpO2:  100% 99% 100%   Weight: 102 kg (224 lb 3.3 oz)      Height:           I/O last 3 completed shifts:  In: 760 [P.O.:660; IV Piggyback:100]  Out: 350 [Urine:350]      Intake/Output Summary (Last 24 hours) at 6/30/2024 1905  Last data filed at 6/30/2024 1747  Gross per 24 hour   Intake 760 ml   Output 350 ml   Net 410 ml       Weight change:     PHYSICAL  "EXAMINATION:  Gen: Awake, Alert, NAD  Head/eyes: AT/NC, pupils equal and round, Anicteric  ENT: mmm  Neck: Supple, No elevated JVP, trachea midline  Resp: Decreased breath sounds bilaterally with bibasilar rales  CV: irreg irreg +S1, S2, No m/r/g  Abd: Soft, obese, NT/ND + BS  Ext: no LE edema bilaterally  Neuro: Follows commands, moves all extermities  Psych: Appropriate affect, normal mood, pleasant attitude, non-combative  Skin: warm; no rash, erythema or venous stasis changes on exposed skin    Lab Results:  Results from last 7 days   Lab Units 06/30/24  0917 06/30/24  0547   WBC Thousand/uL  --  6.68   HEMOGLOBIN g/dL 9.0* 8.5*   HEMATOCRIT % 28.9* 28.1*   PLATELETS Thousands/uL  --  159     Results from last 7 days   Lab Units 06/30/24  0547 06/29/24  1450   POTASSIUM mmol/L 3.8 4.9   CHLORIDE mmol/L 109* 106   CO2 mmol/L 26 22   BUN mg/dL 12 10   CREATININE mg/dL 1.70* 1.28   CALCIUM mg/dL 8.2* 8.9   ALK PHOS U/L  --  60   ALT U/L  --  31   AST U/L  --  60*     No results found for: \"TROPONINT\"      Results from last 7 days   Lab Units 06/30/24  0547 06/29/24  1912 06/29/24  1722 06/29/24  1450   CK TOTAL U/L 325*  --   --   --    HS TNI 0HR ng/L  --   --   --  280*   HS TNI 2HR ng/L  --   --  323*  --    HS TNI 4HR ng/L  --  364*  --   --          Results from last 7 days   Lab Units 06/29/24  1450   INR  1.42*           No results found for this or any previous visit.    No results found for this or any previous visit.    No results found for this or any previous visit.    No results found for this or any previous visit.      CXR: Results for orders placed during the hospital encounter of 05/26/23    XR chest pa & lateral    Narrative  CHEST    INDICATION:   SOB.    COMPARISON: 11/21/2022    EXAM PERFORMED/VIEWS:  XR CHEST PA & LATERAL      FINDINGS:    Cardiomediastinal silhouette appears unremarkable.    Lung markings are crowded secondary to low lung volumes.  Within limitations of this examination there " is no focal airspace opacity to suggest pneumonia.  No pneumothorax or pleural effusion.    No acute osseous abnormality identified within limitations of osteopenia and portable radiography.    Impression  No acute cardiopulmonary findings.            Workstation performed: [id number]    Results for orders placed during the hospital encounter of 06/29/24    XR chest 1 view portable    Narrative  XR CHEST PORTABLE    INDICATION: post line placement.    COMPARISON: Compared with 6/29/2024    FINDINGS: Right neck catheter in the cavoatrial region.  Poor inspiratory effort  Prominent interstitial markings. Linear scarring within the right upper lung. No pneumothorax or pleural effusion.    Normal cardiomediastinal silhouette.    Bones are unremarkable for age.    Normal upper abdomen.    Impression  Mild to moderate congestive changes.        Workstation performed: [id number]      ECG: AF with RVR nonspecific ST-T wave abnormalities    This note was completed in part utilizing M-Modal Fluency Direct Software.  Grammatical errors, random word insertions, spelling mistakes, and incomplete sentences may be an occasional consequence of this system secondary to software limitations, ambient noise, and hardware issues.  If you have any questions or concerns about the content, text, or information contained within the body of this dictation, please contact the provider for clarification.

## 2024-06-30 NOTE — RAPID RESPONSE
Rapid Response Note  Bhavna Al 73 y.o. female MRN: [id number]  Unit/Bed#: ED-11 Encounter: [id number]    Rapid Response Notification(s):   Response called date/time:  6/29/2024 7:23 PM  Response team arrival date/time:  6/29/2024 7:25 PM  Response end date/time:  6/29/2024 8:00 PM  Level of care:  [facility]  Rapid response location:  ED  Primary reason for rapid response call:  Acute change in BP    Rapid Response Intervention(s):   Airway:  None  Breathing:  None  Circulation:  None  Fluids administered:  Lactated Ringer's  Medications administered:  None       Assessment:   Hypotension, etiology likely multifactorial related to medication administration, Afib,     Plan:   The patient had presented with afib with RVR and was given a bolus dose of cardizem. In addition there was a concern for pulmonary edema and she received lasix.   Her blood pressure transiently dropped to a systolic of 65 though the patient remained awake and alert.  The patient was given albumin and upon re-check the BP had improved  Will monitor for now, if BP remains stable, she can continue to be admitted to the [facility] service as a step down 2     Rapid Response Outcome:   Transfer:  Remain on floor  Primary service notified of transfer: Yes    Code Status: Level 1 (Full Code)      Family notified:      Background/Situation:   Bhavna Al is a 73 y.o. female who Was admitted tot he [facility] service today developed transient hypotension in the ED and a RR was called. The BP has been taken in different locations as the patient has a history of sever psoriasis and developed a abrasion on her antecubital site and the BP was taken on the forearm. Upon re-check, it was felt that the BP was still low in the forearm and a rapid response was castellon.d On arrival, the patient was awake and able to answer questions. Her mental status is described as stable. Hse had received a bolus dose of cardizem and lasix for treatment of volume overload and afib with RVR  which may have been a contributing factor in the development of her transient hypotension. The patient was given albumin and had improvement in her BP. She denies CP or SOB at present    Review of Systems   All other systems reviewed and are negative.      Objective:   Vitals:    06/29/24 2001 06/29/24 2015 06/29/24 2045 06/29/24 2100   BP: (!) 94/46 (!) 99/39 111/53 (!) 111/48   BP Location: Right arm Right arm Right arm Right arm   Pulse: 94 89 97 89   Resp: (!) 28 22 20 (!) 30   Temp:       TempSrc:       SpO2: 95% 97% 100%      Physical Exam  Constitutional:       Appearance: She is ill-appearing.   HENT:      Head: Normocephalic.      Mouth/Throat:      Mouth: Mucous membranes are dry.   Eyes:      Pupils: Pupils are equal, round, and reactive to light.   Cardiovascular:      Rate and Rhythm: Tachycardia present.   Pulmonary:      Effort: Pulmonary effort is normal.      Breath sounds: Rales present.   Abdominal:      General: There is no distension.      Palpations: Abdomen is soft.   Musculoskeletal:      Comments: Erythema and skin sloughing in her upper extremities bilaterally   Skin:     General: Skin is warm and dry.   Neurological:      General: No focal deficit present.      Mental Status: Mental status is at baseline.

## 2024-06-30 NOTE — ASSESSMENT & PLAN NOTE
Patient was admitted to [facility] 6/6/2024 to 6/18/2024 for MRSA bacteremia discharge to a rehab facility, presents back to the hospital with respiratory distress requiring BiPAP initially, found to be in A-fib with RVR which possible triggered CHF exacerbation  Initially on presentation heart rate in 150s, received a dose of Cardizem bolus currently heart rate in 100s      She is currently on metoprolol and has been controlled today.  Cardiology to evaluate her  She is on Eliquis

## 2024-06-30 NOTE — ASSESSMENT & PLAN NOTE
Recent MRSA bacteremia with acute osteomyelitis of thoracic spine  Currently on daptomycin 8 mg/kg IV every 24 hours    Patient has a right IJ.    Infectious disease following.

## 2024-06-30 NOTE — CONSULTS
Consultation - Infectious Disease   Bhavna JANUSZ Al 73 y.o. female MRN: [id number]  Unit/Bed#: E4 -01 Encounter: [id number]      IMPRESSION & RECOMMENDATIONS:   1.  MRSA bacteremia and thoracic spine osteomyelitis, under active treatment.  Diagnosed on last admission.  Patient had initially been discharged on vancomycin but developed issues with leukopenia and possibly renal function that led to transition to daptomycin.  Presented now from facility due to #2.  Course now complicated by #4.  Repeat blood cultures pending.  PICC line was removed.  Continue daptomycin 600 mg but will change to every 48 hours for now  Repeat CBC, CMP and CK tomorrow for drug dosing/tolerance  Follow-up pending blood cultures  Continue to trend fever curve/vitals  Monitor for new/developing symptoms  Tentative plan remains for 6 weeks of treatment through 7/22  Possible need for oral therapy after IV course with tracking markers  Will need to rearrange ID follow-up closer to discharge  Additional supportive cares per primary  Additional interventions pending clinical course    2.  Acute hypoxic respiratory failure.  Suspect that this is related to patient's underlying volume status as she had recently been off diuretics.  Respiratory status seems to be improving today.  Antibiotics as above  Diuresis per primary  Oxygen weaning when able  Trend fever curve/WBC    3.  Acute metabolic encephalopathy.  Patient notably confused on exam.  She is answering questions but is disoriented to time and recent events.  Antibiotics as above  Monitor mental status  Low threshold for head imaging  Trend fever curve/WBC    4.  Acute kidney injury on chronic kidney disease.  Noted with acute elevation in creatinine compared to baseline.  Suspect that this is due to diuresis and CK mildly elevated but could be contributing.  Daptomycin dose reduced  Repeat labs as above tomorrow  Fluid hydration per primary  Diuresis as per primary  Consider nephrology  evaluation  Additional interventions pending clinical course    5.  Rheumatoid arthritis on immunosuppressive therapy.  Patient chronically on Plaquenil and prednisone.  Likely risk factor for the above.  She will need follow-up with rheumatology after discharge.    Above plan discussed briefly with the patient at bedside  Above plan discussed with primary service attending who is aware of plans for ongoing IV antibiotics and monitoring renal function    ID consult service will reevaluate this patient again on Monday.  Please contact ID attending on call with questions in the interim.    HISTORY OF PRESENT ILLNESS:  Reason for Consult: MRSA bacteremia under active treatment.    HPI: Bhavna Al is a 73 y.o. year old female with RA on Plaquenil and chronic prednisone, psoriasis, GERD, sarcoidosis, osteoporosis, hyperparathyroidism, atrial fibrillation, heart failure, CKD.  Patient was just recently seen by our service at the beginning of June as she presented for nursing home because she was lethargic and having difficulty completing her ADLs.  She later started to develop fevers and was found to have gram-positive bacteremia.  Earlier in the year the patient had staph scalded skin syndrome associated with MSSA, Staphylococcus lugdunensis bacteremia and was treated with a prolonged course of IV antibiotic.  Cultures on last admission ultimately isolated MRSA and coagulase-negative staph.  2D echo was limited but was grossly without any vegetations.  Patient on that admission was also diagnosed with T9-T10 discitis/vertebral osteomyelitis.  She was planned for 6 weeks of IV antibiotic with vancomycin through 7/22.  Tentative plan was then for transition to oral antibiotic as an outpatient with trending inflammatory markers.  Due to issues with the patient's renal function she was eventually transitioned over to daptomycin for outpatient regimen which she has been on since.  She presented to the hospital yesterday for  respiratory distress with hypoxia at her rehab.  She required BiPAP on route to the ER which was discontinued and patient was later then saturating on room air.  She was found to be in A-fib with RVR.  On my evaluation the patient does not recall any of this.  She is only oriented to self and partially to place but not to time.  There was concern ultimately for possible heart failure exacerbation in the setting of patient being off of her diuretics from the last discharge due to poor oral intake.  She briefly had episode of rapid response called due to transient hypotension.  Patient currently denies having any nausea, vomiting, chest pain or shortness of breath.  She has no recall of what medication she has been on and why.  She denies significant pain in her back at this time.  She does not recall the medications that she is chronically on for her psoriasis either.  We are consulted at this time for further assistance with management of the patient's antibiotics with recent bacteremia.    REVIEW OF SYSTEMS:  A complete 12 point system-based review of systems is negative other than that noted in the HPI.    PAST MEDICAL HISTORY:  Past Medical History:   Diagnosis Date    Abnormal thyroid function test     last assessed: 2015     Arthritis     Caries     last assessed: 2016     Continuous opioid dependence (HCC) 2021    Edema of right lower extremity     last assessed: 2015     GERD (gastroesophageal reflux disease)     Hypertension     Medicare annual wellness visit, subsequent 2021    Positive blood culture 3/11/2022    Sarcoid      Past Surgical History:   Procedure Laterality Date     SECTION      IR NON-TUNNELED CENTRAL LINE PLACEMENT  2024    IR PICC PLACEMENT SINGLE LUMEN  2024    IR PICC PLACEMENT SINGLE LUMEN  2024    MULTIPLE TOOTH EXTRACTIONS N/A 2016    Procedure: Surgical extraction of teeth 2, 18, 19, 30, 31; incision and drainage of left  subperiosteal abscess ;  Surgeon: Clara Cevallos DMD;  Location: [facility];  Service:        FAMILY HISTORY:  Non-contributory    SOCIAL HISTORY:  Social History   Social History     Substance and Sexual Activity   Alcohol Use Not Currently     Social History     Substance and Sexual Activity   Drug Use No     Social History     Tobacco Use   Smoking Status Never   Smokeless Tobacco Never       ALLERGIES:  Allergies   Allergen Reactions    Shellfish-Derived Products - Food Allergy Itching    Methotrexate Derivatives     Amoxicillin Rash    Ampicillin-Sulbactam Sodium Rash       MEDICATIONS:  All current active medications have been reviewed.    PHYSICAL EXAM:  Temp:  [97.6 °F (36.4 °C)-99.7 °F (37.6 °C)] 97.6 °F (36.4 °C)  HR:  [] 79  Resp:  [18-42] 22  BP: ()/(22-84) 111/54  SpO2:  [90 %-100 %] 100 %  Temp (24hrs), Av.4 °F (36.9 °C), Min:97.6 °F (36.4 °C), Max:99.7 °F (37.6 °C)  Current: Temperature: 97.6 °F (36.4 °C)    Intake/Output Summary (Last 24 hours) at 2024 1113  Last data filed at 2024 0901  Gross per 24 hour   Intake 400 ml   Output 250 ml   Net 150 ml       General Appearance:  Chronically ill-appearing, nontoxic and in no acute distress.  Overall poor historian.   Head:  Normocephalic, without obvious abnormality, atraumatic   Eyes:  Conjunctiva pink and sclera anicteric, both eyes   Nose: Nares normal, mucosa normal, no drainage   Throat: Oropharynx moist without lesions   Neck: Supple, symmetrical, no adenopathy, no tenderness/mass/nodules; patient has a central line in place on the right neck.  PICC line was removed on admission.   Back:   Unable to turn the patient myself at this time to fully examine her back.   Lungs:   Currently on nasal cannula.  Decreased breath sounds noted throughout, no wheezes or rales.   Chest Wall:  No tenderness or deformity   Heart:  RRR; no murmur, rub or gallop noted   Abdomen:   Soft, non-tender, non-distended, positive bowel sounds     Extremities: No cyanosis, clubbing or edema   Skin: No rashes or lesions. No draining wounds noted.  Patient has extensive peeling of the skin noted throughout.   Lymph nodes: Cervical, supraclavicular nodes normal   Neurologic: Alert and oriented times 1.  She is spontaneously moving her upper extremities.  Has some limited range of motion of the lower legs.       LABS, IMAGING, & OTHER STUDIES:  In completing this consult I have performed an extensive review of the medical records in epic including review of the notes, radiographs, and laboratory results as detailed below.     Lab Results:  I have personally reviewed pertinent labs.  Comments/Interpretations: No leukocytosis.  Creatinine elevated today.  Creatinine clearance calculated and is borderline.  Mild elevation in CK noted but patient without any muscular symptoms.    Results from last 7 days   Lab Units 06/30/24  0917 06/30/24  0547 06/29/24  1450   WBC Thousand/uL  --  6.68 6.33   HEMOGLOBIN g/dL 9.0* 8.5* 11.7   PLATELETS Thousands/uL  --  159 232     Results from last 7 days   Lab Units 06/30/24  0547 06/29/24  1450   POTASSIUM mmol/L 3.8 4.9   CHLORIDE mmol/L 109* 106   CO2 mmol/L 26 22   BUN mg/dL 12 10   CREATININE mg/dL 1.70* 1.28   EGFR ml/min/1.73sq m 29 40   CALCIUM mg/dL 8.2* 8.9   AST U/L  --  60*   ALT U/L  --  31   ALK PHOS U/L  --  60     Results from last 7 days   Lab Units 06/29/24  1450 06/29/24  1449   BLOOD CULTURE  Received in Microbiology Lab. Culture in Progress. Received in Microbiology Lab. Culture in Progress.       Imaging Studies:   I have personally reviewed pertinent imaging study reports and images in PACS.  Comments/Interpretations: Chest x-ray on admission with atelectasis but no effusion noted and this was taken after PICC line was removed.    Other Studies:   I have personally reviewed other pertinent reports as below.  Records in CareEverywhere: No recent cultures    Nursing home/EMS Records: None    Current/Prior  Cultures: Repeat blood cultures obtained and are pending.

## 2024-06-30 NOTE — PLAN OF CARE
Problem: Potential for Falls  Goal: Patient will remain free of falls  Description: INTERVENTIONS:  - Educate patient/family on patient safety including physical limitations  - Instruct patient to call for assistance with activity   - Consult OT/PT to assist with strengthening/mobility   - Keep Call bell within reach  - Keep bed low and locked with side rails adjusted as appropriate  - Keep care items and personal belongings within reach  - Initiate and maintain comfort rounds  - Make Fall Risk Sign visible to staff  - Offer Toileting every 2 Hours, in advance of need  - Initiate/Maintain bed alarm  - Apply yellow socks and bracelet for high fall risk patients  - Consider moving patient to room near nurses station  Outcome: Progressing     Problem: Prexisting or High Potential for Compromised Skin Integrity  Goal: Skin integrity is maintained or improved  Description: INTERVENTIONS:  - Identify patients at risk for skin breakdown  - Assess and monitor skin integrity  - Assess and monitor nutrition and hydration status  - Monitor labs   - Assess for incontinence   - Turn and reposition patient  - Assist with mobility/ambulation  - Relieve pressure over bony prominences  - Avoid friction and shearing  - Provide appropriate hygiene as needed including keeping skin clean and dry  - Evaluate need for skin moisturizer/barrier cream  - Collaborate with interdisciplinary team   - Patient/family teaching  - Consider wound care consult   Outcome: Progressing     Problem: INFECTION - ADULT  Goal: Absence or prevention of progression during hospitalization  Description: INTERVENTIONS:  - Assess and monitor for signs and symptoms of infection  - Monitor lab/diagnostic results  - Monitor all insertion sites, i.e. indwelling lines, tubes, and drains  - Monitor endotracheal if appropriate and nasal secretions for changes in amount and color  - [place] appropriate cooling/warming therapies per order  - Administer medications as  ordered  - Instruct and encourage patient and family to use good hand hygiene technique  - Identify and instruct in appropriate isolation precautions for identified infection/condition  Outcome: Progressing  Goal: Absence of fever/infection during neutropenic period  Description: INTERVENTIONS:  - Monitor WBC    Outcome: Progressing     Problem: SAFETY ADULT  Goal: Patient will remain free of falls  Description: INTERVENTIONS:  - Educate patient/family on patient safety including physical limitations  - Instruct patient to call for assistance with activity   - Consult OT/PT to assist with strengthening/mobility   - Keep Call bell within reach  - Keep bed low and locked with side rails adjusted as appropriate  - Keep care items and personal belongings within reach  - Initiate and maintain comfort rounds  - Make Fall Risk Sign visible to staff  - Offer Toileting every 2 Hours, in advance of need  - Initiate/Maintain bed alarm  - Apply yellow socks and bracelet for high fall risk patients  - Consider moving patient to room near nurses station  Outcome: Progressing  Goal: Maintain or return to baseline ADL function  Description: INTERVENTIONS:  -  Assess patient's ability to carry out ADLs; assess patient's baseline for ADL function and identify physical deficits which impact ability to perform ADLs (bathing, care of mouth/teeth, toileting, grooming, dressing, etc.)  - Assess/evaluate cause of self-care deficits   - Assess range of motion  - Assess patient's mobility; develop plan if impaired  - Assess patient's need for assistive devices and provide as appropriate  - Encourage maximum independence but intervene and supervise when necessary  - Involve family in performance of ADLs  - Assess for home care needs following discharge   - Consider OT consult to assist with ADL evaluation and planning for discharge  - Provide patient education as appropriate  Outcome: Progressing  Goal: Maintains/Returns to pre admission  functional level  Description: INTERVENTIONS:  - Perform AM-PAC 6 Click Basic Mobility/ Daily Activity assessment daily.  - Set and communicate daily mobility goal to care team and patient/family/caregiver.   - Collaborate with rehabilitation services on mobility goals if consulted  - Perform Range of Motion 3 times a day.  - Reposition patient every 2 hours.  - Record patient progress and toleration of activity level   Outcome: Progressing     Problem: DISCHARGE PLANNING  Goal: Discharge to home or other facility with appropriate resources  Description: INTERVENTIONS:  - Identify barriers to discharge w/patient and caregiver  - Arrange for needed discharge resources and transportation as appropriate  - Identify discharge learning needs (meds, wound care, etc.)  - Arrange for interpretive services to assist at discharge as needed  - Refer to Case Management Department for coordinating discharge planning if the patient needs post-hospital services based on physician/advanced practitioner order or complex needs related to functional status, cognitive ability, or social support system  Outcome: Progressing     Problem: Knowledge Deficit  Goal: Patient/family/caregiver demonstrates understanding of disease process, treatment plan, medications, and discharge instructions  Description: Complete learning assessment and assess knowledge base.  Interventions:  - Provide teaching at level of understanding  - Provide teaching via preferred learning methods  Outcome: Progressing     Problem: CARDIOVASCULAR - ADULT  Goal: Maintains optimal cardiac output and hemodynamic stability  Description: INTERVENTIONS:  - Monitor I/O, vital signs and rhythm  - Monitor for S/S and trends of decreased cardiac output  - Administer and titrate ordered vasoactive medications to optimize hemodynamic stability  - Assess quality of pulses, skin color and temperature  - Assess for signs of decreased coronary artery perfusion  - Instruct patient to  report change in severity of symptoms  Outcome: Progressing     Problem: RESPIRATORY - ADULT  Goal: Achieves optimal ventilation and oxygenation  Description: INTERVENTIONS:  - Assess for changes in respiratory status  - Assess for changes in mentation and behavior  - Position to facilitate oxygenation and minimize respiratory effort  - Oxygen administered by appropriate delivery if ordered  - Initiate smoking cessation education as indicated  - Encourage broncho-pulmonary hygiene including cough, deep breathe, Incentive Spirometry  - Assess the need for suctioning and aspirate as needed  - Assess and instruct to report SOB or any respiratory difficulty  - Respiratory Therapy support as indicated  Outcome: Progressing     Problem: SKIN/TISSUE INTEGRITY - ADULT  Goal: Skin Integrity remains intact(Skin Breakdown Prevention)  Description: Assess:  -Perform Gagan assessment every day  -Clean and moisturize skin every day  -Inspect skin when repositioning, toileting, and assisting with ADLS  -Assess extremities for adequate circulation and sensation     Bed Management:  -Have minimal linens on bed & keep smooth, unwrinkled  -Change linens as needed when moist or perspiring  -Avoid sitting or lying in one position for more than 2 hours while in bed  -Keep HOB at 30 degrees     Toileting:  -Offer bedside commode  -Assess for incontinence every hour    Activity:  -Encourage activity and walks on unit  -Encourage or provide ROM exercises   -Turn and reposition patient every 2 Hours  -Use appropriate equipment to lift or move patient in bed    Skin Care:  -Avoid use of baby powder, tape, friction and shearing, hot water or constrictive clothing  -Relieve pressure over bony prominences using allyven  -Do not massage red bony areas    Goal: Pressure injury heals and does not worsen  Description: Interventions:  - Implement low air loss mattress or specialty surface (Criteria met)  - Apply silicone foam dressing  -  Instruct/assist with weight shifting every 60 minutes when in chair   - Limit chair time to 1 hour intervals  - Use special pressure reducing interventions such as waffle cushion when in chair   - Apply fecal or urinary incontinence containment device   - Turn and reposition patient & offload bony prominences every 2 hours   - Utilize friction reducing device or surface for transfers   - Consider nutrition services referral as needed  Outcome: Progressing

## 2024-07-01 ENCOUNTER — APPOINTMENT (INPATIENT)
Dept: RADIOLOGY | Facility: HOSPITAL | Age: 74
DRG: 291 | End: 2024-07-01
Payer: MEDICARE

## 2024-07-01 LAB
ALBUMIN SERPL BCG-MCNC: 2.5 G/DL (ref 3.5–5)
ALP SERPL-CCNC: 39 U/L (ref 34–104)
ALT SERPL W P-5'-P-CCNC: 28 U/L (ref 7–52)
ANION GAP SERPL CALCULATED.3IONS-SCNC: 6 MMOL/L (ref 4–13)
AST SERPL W P-5'-P-CCNC: 48 U/L (ref 13–39)
BASOPHILS # BLD AUTO: 0.01 THOUSANDS/ÂΜL (ref 0–0.1)
BASOPHILS NFR BLD AUTO: 0 % (ref 0–1)
BILIRUB SERPL-MCNC: 0.34 MG/DL (ref 0.2–1)
BUN SERPL-MCNC: 13 MG/DL (ref 5–25)
CALCIUM ALBUM COR SERPL-MCNC: 9.7 MG/DL (ref 8.3–10.1)
CALCIUM SERPL-MCNC: 8.5 MG/DL (ref 8.4–10.2)
CHLORIDE SERPL-SCNC: 108 MMOL/L (ref 96–108)
CK SERPL-CCNC: 539 U/L (ref 26–192)
CK SERPL-CCNC: 554 U/L (ref 26–192)
CO2 SERPL-SCNC: 28 MMOL/L (ref 21–32)
CREAT SERPL-MCNC: 1.47 MG/DL (ref 0.6–1.3)
EOSINOPHIL # BLD AUTO: 0.17 THOUSAND/ÂΜL (ref 0–0.61)
EOSINOPHIL NFR BLD AUTO: 4 % (ref 0–6)
ERYTHROCYTE [DISTWIDTH] IN BLOOD BY AUTOMATED COUNT: 15.9 % (ref 11.6–15.1)
GFR SERPL CREATININE-BSD FRML MDRD: 34 ML/MIN/1.73SQ M
GLUCOSE SERPL-MCNC: 77 MG/DL (ref 65–140)
HCT VFR BLD AUTO: 27.8 % (ref 34.8–46.1)
HGB BLD-MCNC: 8.8 G/DL (ref 11.5–15.4)
IMM GRANULOCYTES # BLD AUTO: 0.01 THOUSAND/UL (ref 0–0.2)
IMM GRANULOCYTES NFR BLD AUTO: 0 % (ref 0–2)
LYMPHOCYTES # BLD AUTO: 0.6 THOUSANDS/ÂΜL (ref 0.6–4.47)
LYMPHOCYTES NFR BLD AUTO: 13 % (ref 14–44)
MCH RBC QN AUTO: 28.4 PG (ref 26.8–34.3)
MCHC RBC AUTO-ENTMCNC: 31.7 G/DL (ref 31.4–37.4)
MCV RBC AUTO: 90 FL (ref 82–98)
MONOCYTES # BLD AUTO: 0.38 THOUSAND/ÂΜL (ref 0.17–1.22)
MONOCYTES NFR BLD AUTO: 8 % (ref 4–12)
NEUTROPHILS # BLD AUTO: 3.39 THOUSANDS/ÂΜL (ref 1.85–7.62)
NEUTS SEG NFR BLD AUTO: 75 % (ref 43–75)
NRBC BLD AUTO-RTO: 0 /100 WBCS
PLATELET # BLD AUTO: 148 THOUSANDS/UL (ref 149–390)
PMV BLD AUTO: 12.6 FL (ref 8.9–12.7)
POTASSIUM SERPL-SCNC: 3.5 MMOL/L (ref 3.5–5.3)
PROT SERPL-MCNC: 5.2 G/DL (ref 6.4–8.4)
RBC # BLD AUTO: 3.1 MILLION/UL (ref 3.81–5.12)
SODIUM SERPL-SCNC: 142 MMOL/L (ref 135–147)
WBC # BLD AUTO: 4.56 THOUSAND/UL (ref 4.31–10.16)

## 2024-07-01 PROCEDURE — 99232 SBSQ HOSP IP/OBS MODERATE 35: CPT | Performed by: INTERNAL MEDICINE

## 2024-07-01 PROCEDURE — 99233 SBSQ HOSP IP/OBS HIGH 50: CPT | Performed by: INTERNAL MEDICINE

## 2024-07-01 PROCEDURE — 82550 ASSAY OF CK (CPK): CPT | Performed by: INTERNAL MEDICINE

## 2024-07-01 PROCEDURE — 85025 COMPLETE CBC W/AUTO DIFF WBC: CPT | Performed by: INTERNAL MEDICINE

## 2024-07-01 PROCEDURE — 71045 X-RAY EXAM CHEST 1 VIEW: CPT

## 2024-07-01 PROCEDURE — 80053 COMPREHEN METABOLIC PANEL: CPT | Performed by: INTERNAL MEDICINE

## 2024-07-01 RX ORDER — VANCOMYCIN HYDROCHLORIDE 750 MG/150ML
750 INJECTION, SOLUTION INTRAVENOUS EVERY 24 HOURS
Status: DISCONTINUED | OUTPATIENT
Start: 2024-07-02 | End: 2024-07-05 | Stop reason: HOSPADM

## 2024-07-01 RX ORDER — AMMONIUM LACTATE 12 G/100G
CREAM TOPICAL 2 TIMES DAILY
Status: DISCONTINUED | OUTPATIENT
Start: 2024-07-01 | End: 2024-07-05 | Stop reason: HOSPADM

## 2024-07-01 RX ORDER — MICONAZOLE NITRATE 20 MG/G
CREAM TOPICAL 2 TIMES DAILY
Status: DISCONTINUED | OUTPATIENT
Start: 2024-07-01 | End: 2024-07-05 | Stop reason: HOSPADM

## 2024-07-01 RX ADMIN — HYDROXYCHLOROQUINE SULFATE 200 MG: 200 TABLET ORAL at 17:01

## 2024-07-01 RX ADMIN — METOPROLOL TARTRATE 25 MG: 25 TABLET, FILM COATED ORAL at 08:47

## 2024-07-01 RX ADMIN — APIXABAN 5 MG: 5 TABLET, FILM COATED ORAL at 08:47

## 2024-07-01 RX ADMIN — AMMONIUM LACTATE: 12 CREAM TOPICAL at 14:07

## 2024-07-01 RX ADMIN — PREDNISONE 5 MG: 5 TABLET ORAL at 16:37

## 2024-07-01 RX ADMIN — MICONAZOLE NITRATE: 20 CREAM TOPICAL at 17:04

## 2024-07-01 RX ADMIN — APIXABAN 5 MG: 5 TABLET, FILM COATED ORAL at 21:15

## 2024-07-01 RX ADMIN — MICONAZOLE NITRATE: 20 CREAM TOPICAL at 14:07

## 2024-07-01 RX ADMIN — SODIUM CHLORIDE 1750 MG: 0.9 INJECTION, SOLUTION INTRAVENOUS at 12:56

## 2024-07-01 RX ADMIN — PANTOPRAZOLE SODIUM 40 MG: 40 TABLET, DELAYED RELEASE ORAL at 05:31

## 2024-07-01 RX ADMIN — AMMONIUM LACTATE: 12 CREAM TOPICAL at 17:02

## 2024-07-01 RX ADMIN — METOPROLOL TARTRATE 25 MG: 25 TABLET, FILM COATED ORAL at 21:15

## 2024-07-01 RX ADMIN — HYDROXYCHLOROQUINE SULFATE 200 MG: 200 TABLET ORAL at 08:47

## 2024-07-01 RX ADMIN — PREDNISONE 5 MG: 5 TABLET ORAL at 08:47

## 2024-07-01 NOTE — ASSESSMENT & PLAN NOTE
Patient was admitted to [facility] 6/6/2024 to 6/18/2024 for MRSA bacteremia discharge to a rehab facility, presents back to the hospital with respiratory distress requiring BiPAP initially, found to be in A-fib with RVR which possible triggered CHF exacerbation  Initially on presentation heart rate in 150s, received a dose of Cardizem bolus currently heart rate in 100s  She is currently on metoprolol and has been controlled.  Cardiology is on board appreciate recommendations.  Patient received IV digoxin over the weekend  If patient requires additional rate control despite the use of metoprolol, consider oral digoxin  Continue Eliquis.

## 2024-07-01 NOTE — ASSESSMENT & PLAN NOTE
Wt Readings from Last 3 Encounters:   07/01/24 102 kg (223 lb 15.8 oz)   06/18/24 108 kg (238 lb 8.6 oz)   06/08/24 103 kg (228 lb)     Patient has history of diastolic heart failure, echo 6/8/2024 showed ejection fraction of 65%  According to previous hospitalization her home regimen is Lasix 40 mg daily which was held in the setting of AMANDA and reduced p.o. intake.  No diuretic on nursing home paperwork  Initially requiring BiPAP, currently on room air  Chest x-ray with vascular congestion    Currently diuretic on hold for renal recovery per cardiology recommendation   will defer diuretics to cardiology

## 2024-07-01 NOTE — DISCHARGE INSTR - OTHER ORDERS
Wound Care Plan:   1-Apply Manjeet cream twice daily and as needed to buttocks, sacrum, proximal posterior thighs, breast and abdomen folds   2-Elevate heels using off-loading heel boots.  3-Offloading air cushion in chair when out of bed.  4-Apply Ammonium Lactate twice daily and as needed to dry skin on back, bilateral legs, feet.   5-Turn/reposition every 2 hours while in bed and weight shift frequently while in chair for pressure re-distribution on skin.   6-Place patient on [employer] turning/repositioning system.

## 2024-07-01 NOTE — PLAN OF CARE
Problem: Potential for Falls  Goal: Patient will remain free of falls  Description: INTERVENTIONS:  - Educate patient/family on patient safety including physical limitations  - Instruct patient to call for assistance with activity   - Consult OT/PT to assist with strengthening/mobility   - Keep Call bell within reach  - Keep bed low and locked with side rails adjusted as appropriate  - Keep care items and personal belongings within reach  - Initiate and maintain comfort rounds  - Make Fall Risk Sign visible to staff  - Offer Toileting every 2 Hours, in advance of need  - Initiate/Maintain bed alarm  - Obtain necessary fall risk management equipment  - Apply yellow socks and bracelet for high fall risk patients  - Consider moving patient to room near nurses station  Outcome: Progressing     Problem: Prexisting or High Potential for Compromised Skin Integrity  Goal: Skin integrity is maintained or improved  Description: INTERVENTIONS:  - Identify patients at risk for skin breakdown  - Assess and monitor skin integrity  - Assess and monitor nutrition and hydration status  - Monitor labs   - Assess for incontinence   - Turn and reposition patient  - Assist with mobility/ambulation  - Relieve pressure over bony prominences  - Avoid friction and shearing  - Provide appropriate hygiene as needed including keeping skin clean and dry  - Evaluate need for skin moisturizer/barrier cream  - Collaborate with interdisciplinary team   - Patient/family teaching  - Consider wound care consult   Outcome: Progressing     Problem: INFECTION - ADULT  Goal: Absence or prevention of progression during hospitalization  Description: INTERVENTIONS:  - Assess and monitor for signs and symptoms of infection  - Monitor lab/diagnostic results  - Monitor all insertion sites, i.e. indwelling lines, tubes, and drains  - Monitor endotracheal if appropriate and nasal secretions for changes in amount and color  - [place] appropriate  cooling/warming therapies per order  - Administer medications as ordered  - Instruct and encourage patient and family to use good hand hygiene technique  - Identify and instruct in appropriate isolation precautions for identified infection/condition  Outcome: Progressing  Goal: Absence of fever/infection during neutropenic period  Description: INTERVENTIONS:  - Monitor WBC    Outcome: Progressing     Problem: SAFETY ADULT  Goal: Patient will remain free of falls  Description: INTERVENTIONS:  - Educate patient/family on patient safety including physical limitations  - Instruct patient to call for assistance with activity   - Consult OT/PT to assist with strengthening/mobility   - Keep Call bell within reach  - Keep bed low and locked with side rails adjusted as appropriate  - Keep care items and personal belongings within reach  - Initiate and maintain comfort rounds  - Make Fall Risk Sign visible to staff  - Offer Toileting every 2 Hours, in advance of need  - Initiate/Maintain bed alarm  - Obtain necessary fall risk management equipment  - Apply yellow socks and bracelet for high fall risk patients  - Consider moving patient to room near nurses station  Outcome: Progressing  Goal: Maintain or return to baseline ADL function  Description: INTERVENTIONS:  -  Assess patient's ability to carry out ADLs; assess patient's baseline for ADL function and identify physical deficits which impact ability to perform ADLs (bathing, care of mouth/teeth, toileting, grooming, dressing, etc.)  - Assess/evaluate cause of self-care deficits   - Assess range of motion  - Assess patient's mobility; develop plan if impaired  - Assess patient's need for assistive devices and provide as appropriate  - Encourage maximum independence but intervene and supervise when necessary  - Involve family in performance of ADLs  - Assess for home care needs following discharge   - Consider OT consult to assist with ADL evaluation and planning for  discharge  - Provide patient education as appropriate  Outcome: Progressing  Goal: Maintains/Returns to pre admission functional level  Description: INTERVENTIONS:  - Perform AM-PAC 6 Click Basic Mobility/ Daily Activity assessment daily.  - Set and communicate daily mobility goal to care team and patient/family/caregiver.   - Collaborate with rehabilitation services on mobility goals if consulted  - Perform Range of Motion 3 times a day.  - Reposition patient every 2 hours.  - Dangle patient 3 times a day  - Stand patient 3 times a day  - Ambulate patient 3 times a day  - Out of bed to chair 3 times a day   - Out of bed for meals 3 times a day  - Out of bed for toileting  - Record patient progress and toleration of activity level   Outcome: Progressing     Problem: DISCHARGE PLANNING  Goal: Discharge to home or other facility with appropriate resources  Description: INTERVENTIONS:  - Identify barriers to discharge w/patient and caregiver  - Arrange for needed discharge resources and transportation as appropriate  - Identify discharge learning needs (meds, wound care, etc.)  - Arrange for interpretive services to assist at discharge as needed  - Refer to Case Management Department for coordinating discharge planning if the patient needs post-hospital services based on physician/advanced practitioner order or complex needs related to functional status, cognitive ability, or social support system  Outcome: Progressing     Problem: Knowledge Deficit  Goal: Patient/family/caregiver demonstrates understanding of disease process, treatment plan, medications, and discharge instructions  Description: Complete learning assessment and assess knowledge base.  Interventions:  - Provide teaching at level of understanding  - Provide teaching via preferred learning methods  Outcome: Progressing     Problem: CARDIOVASCULAR - ADULT  Goal: Maintains optimal cardiac output and hemodynamic stability  Description: INTERVENTIONS:  -  Monitor I/O, vital signs and rhythm  - Monitor for S/S and trends of decreased cardiac output  - Administer and titrate ordered vasoactive medications to optimize hemodynamic stability  - Assess quality of pulses, skin color and temperature  - Assess for signs of decreased coronary artery perfusion  - Instruct patient to report change in severity of symptoms  Outcome: Progressing     Problem: RESPIRATORY - ADULT  Goal: Achieves optimal ventilation and oxygenation  Description: INTERVENTIONS:  - Assess for changes in respiratory status  - Assess for changes in mentation and behavior  - Position to facilitate oxygenation and minimize respiratory effort  - Oxygen administered by appropriate delivery if ordered  - Initiate smoking cessation education as indicated  - Encourage broncho-pulmonary hygiene including cough, deep breathe, Incentive Spirometry  - Assess the need for suctioning and aspirate as needed  - Assess and instruct to report SOB or any respiratory difficulty  - Respiratory Therapy support as indicated  Outcome: Progressing     Problem: SKIN/TISSUE INTEGRITY - ADULT  Goal: Skin Integrity remains intact(Skin Breakdown Prevention)  Description: Assess:  -Perform G[place] assessment every shift  -Clean and moisturize skin   -Inspect skin when repositioning, toileting, and assisting with ADLS  -Assess under medical devices   -Assess extremities for adequate circulation and sensation     Bed Management:  -Have minimal linens on bed & keep smooth, unwrinkled  -Change linens as needed when moist or perspiring  -Avoid sitting or lying in one position for more than 2 hours while in bed  -Keep HOB at 30 degrees     Toileting:  -Offer bedside commode  -Assess for incontinence   -Use incontinent care products after each incontinent episode     Activity:  -Mobilize patient 4 times a day  -Encourage activity and walks on unit  -Encourage or provide ROM exercises   -Turn and reposition patient every 2 Hours  -Use  appropriate equipment to lift or move patient in bed  -Instruct/ Assist with weight shifting every 60 minutes when out of bed in chair  -Consider limitation of chair time 2 hour intervals    Skin Care:  -Avoid use of baby powder, tape, friction and shearing, hot water or constrictive clothing  -Relieve pressure over bony prominences using allevyn  -Do not massage red bony areas    Next Steps:  -Teach patient strategies to minimize risks    -Consider consults to  interdisciplinary teams such as wound care  Outcome: Progressing  Goal: Pressure injury heals and does not worsen  Description: Interventions:  - Implement low air loss mattress or specialty surface (Criteria met)  - Apply silicone foam dressing  - Instruct/assist with weight shifting every 60 minutes when in chair   - Limit chair time to 2 hour intervals  - Use special pressure reducing interventions such as waffle cushion when in chair   - Apply fecal or urinary incontinence containment device   - Perform passive or active ROM   - Turn and reposition patient & offload bony prominences every 2 hours   - Utilize friction reducing device or surface for transfers   - Consider consults to  interdisciplinary teams  - Use incontinent care products after each incontinent episode   - Consider nutrition services referral as needed  Outcome: Progressing

## 2024-07-01 NOTE — WOUND OSTOMY CARE
Consult Note - Wound   Bhavna P Al 73 y.o. female MRN: [id number]  Unit/Bed#: E4 -01 Encounter: [id number]      History and Present Illness:  73 year old female presents to hospital with respiratory distress. Patient has significant PMH of Afib, CHF, MRSA, POA pressure ulcers and Adrenal insufficiency.    Assessment Findings:   Patient is awake, lying in bed and agreeable to assessment.  Patient is incontinent of urine and bowel, and has an external purewick catheter.  Patient was admitted from a rehabilitation facility.  While open to the assessment, the patient was unable to assist with turning, and complained of much pain upon any movement.  Overall skin is extremely dry and flaky.  Patient is being followed by dietary and has nutrition supplements ordered. Bilateral heels intact and blanchable, extremely dry.  Breast and groin folds macerated with sloughing epidermis.    Present on Admission MASD Right Posterior Thigh-  Three areas measured together as one. Wound bed is pink with no drainage.  Maia wound is extremely dry.  Present on Admission Right Ankle Pressure Injury-Appears to be healed.  Clean dry and intact.  No drainage.  Present on Admission MASD of Pelvis/Abdominal Area-Area in abdominal folds is pink partial thickness with no drainage.  Appears to be moisture related with possible yeast component.  Present on Admission MASD Sacrum Mid Area-  [place] wound bed appears to be moisture related. Partial thickness.  Maia wound area clean, very dry and intact.  No drainage.  Present on Admission MASD Left Posterior Thigh-Wound bed pink and partial thickness.  Appears to be moisture related.  Maia wound is extremely dry and flaky. No drainage.    See flowsheet for wound details.    Wound Care Plan:   1-Apply Manjeet cream twice daily and as needed to buttocks, sacrum, proximal posterior thighs, breast and abdomen folds   2-Elevate heels using off-loading heel boots.  3-Offloading air cushion in chair when out  of bed.  4-Apply Ammonium Lactate twice daily and as needed to dry skin on back, bilateral legs, feet.   5-Turn/reposition every 2 hours while in bed and weight shift frequently while in chair for pressure re-distribution on skin.   6-Place patient on Gradalis turning/repositioning system.    Wound care team to follow.  Plan of care reviewed with primary RN.    Wound 06/06/24 MASD Sacrum Mid (Active)   Wound Image   07/01/24 1044   Wound Description Pink 07/01/24 1044   Maia-wound Assessment Dry;Intact 07/01/24 1044   Wound Length (cm) 2.8 cm 07/01/24 1044   Wound Width (cm) 0.5 cm 07/01/24 1044   Wound Depth (cm) 0.2 cm 07/01/24 1044   Wound Surface Area (cm^2) 1.4 cm^2 07/01/24 1044   Wound Volume (cm^3) 0.28 cm^3 07/01/24 1044   Calculated Wound Volume (cm^3) 0.28 cm^3 07/01/24 1044   Change in Wound Size % 85.26 07/01/24 1044   Drainage Amount None 07/01/24 1044   Treatments Site care;Cleansed 07/01/24 1044   Patient Tolerance Tolerated poorly 07/01/24 1044       Wound 06/06/24 MASD Thigh Left;Posterior;Upper (Active)   Wound Image   07/01/24 1046   Wound Description [place] 07/01/24 1600   Maia-wound Assessment Dry 07/01/24 1046   Wound Length (cm) 0.3 cm 07/01/24 1046   Wound Width (cm) 0.8 cm 07/01/24 1046   Wound Depth (cm) 0.1 cm 07/01/24 1046   Wound Surface Area (cm^2) 0.24 cm^2 07/01/24 1046   Wound Volume (cm^3) 0.024 cm^3 07/01/24 1046   Calculated Wound Volume (cm^3) 0.02 cm^3 07/01/24 1046   Change in Wound Size % 92 07/01/24 1046   Treatments Cleansed;Site care 07/01/24 1046   Dressing Moisture barrier 07/01/24 1600   Patient Tolerance Tolerated poorly 07/01/24 1046       Wound 06/30/24 MASD Thigh Posterior;Right (Active)   Wound Image   06/30/24 0050   Wound Description [place] 07/01/24 1600   Maia-wound Assessment Dry 07/01/24 1046   Wound Length (cm) 9 cm 07/01/24 1046   Wound Width (cm) 0.5 cm 07/01/24 1046   Wound Depth (cm) 0.1 cm 07/01/24 1046   Wound Surface Area (cm^2) 4.5 cm^2 07/01/24 1046    Wound Volume (cm^3) 0.45 cm^3 07/01/24 1046   Calculated Wound Volume (cm^3) 0.45 cm^3 07/01/24 1046   Treatments Cleansed;Site care 07/01/24 1046       Wound 06/30/24 Pressure Injury Ankle Anterior;Right (Active)   Wound Image   07/01/24 1042   Maia-wound Assessment Clean;Dry;Intact 07/01/24 1042   Wound Length (cm) 0 cm 07/01/24 1042   Wound Width (cm) 0 cm 07/01/24 1042   Wound Depth (cm) 0 cm 07/01/24 1042   Wound Surface Area (cm^2) 0 cm^2 07/01/24 1042   Wound Volume (cm^3) 0 cm^3 07/01/24 1042   Calculated Wound Volume (cm^3) 0 cm^3 07/01/24 1042   Drainage Amount None 07/01/24 1042   Treatments Cleansed;Site care 07/01/24 1042   Patient Tolerance Tolerated well 07/01/24 1042       Wound 07/01/24 MASD Pelvis Anterior (Active)   Wound Image   07/01/24 1036   Wound Description Pink 07/01/24 1036   Maia-wound Assessment Clean;Intact 07/01/24 1036   Wound Length (cm) 0.4 cm 07/01/24 1036   Wound Width (cm) 20 cm 07/01/24 1036   Wound Depth (cm) 0.1 cm 07/01/24 1036   Wound Surface Area (cm^2) 8 cm^2 07/01/24 1036   Wound Volume (cm^3) 0.8 cm^3 07/01/24 1036   Calculated Wound Volume (cm^3) 0.8 cm^3 07/01/24 1036   Drainage Amount None 07/01/24 1036   Non-staged Wound Description Partial thickness 07/01/24 1036   Treatments Site care;Cleansed 07/01/24 1036   Dressing Open to air 07/01/24 1036   Patient Tolerance Tolerated well 07/01/24 1036         Sonia Cha, RN, BSN    Assessment and Plan of Care completed with Naima Ribera, RN, BSN, CWON

## 2024-07-01 NOTE — PROGRESS NOTES
Progress Note - Infectious Disease   Bhavna Al 73 y.o. female MRN: [id number]  Unit/Bed#: E4 -01 Encounter: [id number]    Impression/Plan:  1. MRSA bacteremia and thoracic spine osteomyelitis, under active treatment.  Diagnosed on last admission.  Patient had initially been discharged on vancomycin but developed issues with leukopenia and possible renal function issue, which led to transition to daptomycin. She is now back inpatient from facility due to acute hypoxic respiratory failure, with additional AMANDA. PICC was removed. Admission blood cultures are negative >24 hours. Daptomycin dosing was changed to q48. This morning the patient is afebrile with stable WBC count. CK was elevated to 325 yesterday and is further elevated to 539 today. At this point patient will need to discontinue the Daptomycin. Will transition her back to Vancomycin and consult pharmacy for guidance on dosing.   -stop IV Daptomycin  -restart IV vancomycin  -consult pharmacy for guidance on vancomycin dosage  -check CBCD, BMP, and CK tomorrow  -follow up admission blood cultures  -monitor vitals  -patient will need new PICC placed as she will need to continue her plan for 6 weeks of IV antibiotic treatment through 7/22/2024  -she will need follow up with the outpatient ID office after discharge, I will coordinate and place information in her discharge planning      2. Acute hypoxic respiratory failure.  Suspect that this is related to patient's underlying volume status as she had recently been off diuretics.  Respiratory status is improving again today. This morning her O2 saturation is stable on room air.  -volume management per SLIM  -monitor vitals  -monitor respiratory status    3. Acute metabolic encephalopathy.  Patient very alert and interactive during my visit but is still somewhat confused about having an active infection and needing ongoing treatment.   -antibiotic as above  -recommend aspiration precautions   -serial neuro  "exams  -monitor mood and mental status  -supportive care     4. Acute kidney injury. On chronic kidney disease.  This impacts antibiotic dosing. Upon review of her available medical records it appears her baseline creatinine is approximately 1.1-1.2. Creatinine appears to have peaked at 1.7. This is likely due to volume issues. Also consider role of recent antibiotic use. Her creatinine is trending down this morning.   -monitor creatinine  -dose adjust antibiotic for renal function as needed  -avoid nephrotoxins  -volume management per primary service     5.  Rheumatoid arthritis on immunosuppressive therapy.  Patient chronically on Plaquenil and prednisone. This is risk factor for infection above  -recommend ongoing follow up with rheumatology after discharge     Above plan was discussed in detail with patient at the bedside.  Above plan was discussed in detail with XENIA who agrees with plan for ongoing Daptomycin treatment.    Antibiotics:  Daptomycin - stop    Subjective:  Patient reports she's doing alright today. She tells me \"I'm told I have an infection, I don't really know for sure.\" She denies new pain this morning. She denies chills, sweats, and shakes. She's been afebrile overnight and this morning. She asks me to removed the banana from her breakfast tray and she doesn't like banana and the smell of it is making her nauseous. She denies vomiting, abdominal pain, cough, shortness of breath, and chest pain. She offers no new symptoms.    Objective:  Vitals:  Temp:  [96.9 °F (36.1 °C)-98.1 °F (36.7 °C)] 97.8 °F (36.6 °C)  HR:  [77-82] 78  Resp:  [18-22] 18  BP: (110-141)/(52-83) 116/70  SpO2:  [99 %-100 %] 100 %  Temp (24hrs), Av.5 °F (36.4 °C), Min:96.9 °F (36.1 °C), Max:98.1 °F (36.7 °C)  Current: Temperature: 97.8 °F (36.6 °C)    Physical Exam:   General Appearance:  Alert, interactive, nontoxic, no acute distress. She appears comfortable sitting up in bed having breakfast. She appears chronically " ill and debilitated.   Throat: Oropharynx moist without lesions.    Lungs:   Clear to auscultation bilaterally; no wheezes, rhonchi or rales; respirations unlabored on room air.   Heart:  Irregular; no murmur, rub or gallop.   Abdomen:   Soft, non-tender, non-distended, positive bowel sounds.     Extremities: No clubbing or cyanosis, no edema.   Skin: No new rashes noted on exposed skin. Dry skin.     Labs, Imaging, & Other studies:   All pertinent labs and imaging studies were personally reviewed  Results from last 7 days   Lab Units 07/01/24  0528 06/30/24  0917 06/30/24  0547 06/29/24  1450   WBC Thousand/uL 4.56  --  6.68 6.33   HEMOGLOBIN g/dL 8.8* 9.0* 8.5* 11.7   PLATELETS Thousands/uL 148*  --  159 232     Results from last 7 days   Lab Units 07/01/24  0528 06/30/24  0547 06/29/24  1450   POTASSIUM mmol/L 3.5   < > 4.9   CHLORIDE mmol/L 108   < > 106   CO2 mmol/L 28   < > 22   BUN mg/dL 13   < > 10   CREATININE mg/dL 1.47*   < > 1.28   EGFR ml/min/1.73sq m 34   < > 40   CALCIUM mg/dL 8.5   < > 8.9   AST U/L 48*  --  60*   ALT U/L 28  --  31   ALK PHOS U/L 39  --  60    < > = values in this interval not displayed.     Results from last 7 days   Lab Units 06/29/24  1450 06/29/24  1449   BLOOD CULTURE  No Growth at 24 hrs. No Growth at 24 hrs.

## 2024-07-01 NOTE — ASSESSMENT & PLAN NOTE
Suspect in the setting of A-fib with RVR and heart failure  EKG without acute ischemic changes  Continue telemetry

## 2024-07-01 NOTE — PROGRESS NOTES
Cardiology Progress Note   MD Osvaldo Reyes MD, FACC  Kostas Bruce DO, [facility] Coby Simpson DO, [facility]  Kelton Rodriguez DO [facility]  ----------------------------------------------------------------  [address] [address]    Bhavna BOUDREAUX Al 73 y.o. female MRN: [id number]  Unit/Bed#: E4 -01 Encounter: [id number]      ASSESSMENT:   Acute on chronic diastolic heart failure  LVEF 65%, probable diastolic dysfunction, severe LA dilatation, moderate MAC, trace MR/TR, June 2024  Acute hypoxic respiratory failure  Acute metabolic encephalopathy  Persistent atrial fibrillation on Eliquis  Nonischemic troponin elevation secondary to heart failure  Recent MRSA bacteremia secondary to osteomyelitis  Acute kidney injury  Hypertension  Morbid obesity  Rheumatoid arthritis  Secondary adrenal insufficiency  Anemia  GERD    PLAN:  Patient clinically improved today and creatinine is coming down  Repeat chest x-ray shows linear scarring, but overall the x-ray does not look somewhat improved from admission; awaiting formal read  Continue Lopressor 25 mg twice daily  Strict I's/O's and daily weights  Keep potassium greater than 4 and magnesium greater than 2  Eliquis for thromboembolic prophylaxis  Closely follow weights and see if the patient's weights remain stable and rate controlled AF  May end up giving Lasix as needed at discharge  Would ambulate the patient to see if her heart rates remain controlled on current dose of metoprolol  Further recommendations to follow testing    Signed: Kostas Bruce DO, [facility] FACP      History of Present Illness:  Patient seen and examined.  Denies chest pain, pressure, tightness or squeezing.  Denies lightheadedness, dizziness or palpitations.  Denies lower extremity swelling, orthopnea or paroxysmal nocturnal dyspnea.      Review of Systems:  Review of Systems   Constitutional: Negative for decreased appetite,  fever, weight gain and weight loss.   HENT:  Negative for congestion and sore throat.    Eyes:  Negative for visual disturbance.   Cardiovascular:  Negative for chest pain, dyspnea on exertion, leg swelling, near-syncope and palpitations.   Respiratory:  Negative for cough and shortness of breath.    Hematologic/Lymphatic: Negative for bleeding problem.   Skin:  Negative for rash.   Musculoskeletal:  Negative for myalgias and neck pain.   Gastrointestinal:  Negative for abdominal pain and nausea.   Neurological:  Negative for light-headedness and weakness.   Psychiatric/Behavioral:  Negative for depression.        Allergies   Allergen Reactions    Shellfish-Derived Products - Food Allergy Itching    Methotrexate Derivatives     Amoxicillin Rash    Ampicillin-Sulbactam Sodium Rash       No current facility-administered medications on file prior to encounter.     Current Outpatient Medications on File Prior to Encounter   Medication Sig    acetaminophen (TYLENOL) 325 mg tablet Take 2 tablets (650 mg total) by mouth every 4 (four) hours as needed for mild pain, headaches or fever.    albuterol (Ventolin HFA) 90 mcg/act inhaler Inhale 2 puffs every 6 (six) hours as needed for wheezing    alendronate (FOSAMAX) 70 mg tablet Take by mouth every 7 days     apixaban (ELIQUIS) 5 mg Take 1 tablet (5 mg total) by mouth 2 (two) times a day    bisacodyl (FLEET) 10 MG/30ML ENEM Insert 10 mg into the rectum once    cholecalciferol (VITAMIN D3) 1,000 units tablet Take 1 tablet (1,000 Units total) by mouth daily    clobetasol (TEMOVATE) 0.05 % cream Apply topically 2 (two) times a day    DAPTOmycin (CUBICIN) 50 mL IVPB Inject 500 mg into a catheter in a vein over 30 minutes at 100 mL/hr every 24 hours    hydroxychloroquine (PLAQUENIL) 200 mg tablet Take 1 tablet (200 mg total) by mouth 2 (two) times a day    lactulose (CHRONULAC) 10 g/15 mL solution Take 30 mL (20 g total) by mouth 3 (three) times a day    lidocaine (LIDODERM) 5 %  Apply 2 patches topically daily Remove & Discard patch within 12 hours or as directed by MD Do not start before December 20, 2022.    metoprolol tartrate (LOPRESSOR) 25 mg tablet Take 1 tablet (25 mg total) by mouth every 12 (twelve) hours    miconazole 2 % cream Apply topically 2 (two) times a day    nystatin (MYCOSTATIN) powder Apply topically 2 (two) times a day    pantoprazole (PROTONIX) 40 mg tablet Take 1 tablet (40 mg total) by mouth daily in the early morning    polyethylene glycol (MIRALAX) 17 g packet Take 17 g by mouth daily Do not start before November 26, 2022. (Patient not taking: Reported on 2/19/2024)    predniSONE 5 mg tablet Take 1 tablet (5 mg total) by mouth 2 (two) times a day with meals Do not start before June 5, 2023.    triamcinolone (KENALOG) 0.1 % cream Apply topically 2 (two) times a day Apply to BUE and BLE topically everyday and evening shift for psoriasis    Zinc 50 MG TABS Take 1 tablet by mouth in the morning        Current Facility-Administered Medications   Medication Dose Route Frequency Provider Last Rate    acetaminophen  650 mg Oral Q4H PRN Shania Velarde MD      albuterol  2 puff Inhalation Q6H PRN Shania Velarde MD      ammonium lactate   Topical BID PRN Dawood Lambert PA-C      apixaban  5 mg Oral BID Shania Velarde MD      DAPTOmycin  8 mg/kg (Adjusted) Intravenous Q48H Debra Madden MD      hydroxychloroquine  200 mg Oral BID Shania Velarde MD      metoprolol tartrate  25 mg Oral Q12H [facility] Kostas Bruce DO      pantoprazole  40 mg Oral Early Morning Shania Velarde MD      predniSONE  5 mg Oral BID With Meals Shania Velarde MD              Vitals:    07/01/24 0025 07/01/24 0338 07/01/24 0600 07/01/24 0812   BP: 118/63 116/70  105/70   BP Location: Right arm Right arm  Right arm   Pulse: 77 78  80   Resp: 18 18  18   Temp: 98.1 °F (36.7 °C) 97.8 °F (36.6 °C)  97.8 °F (36.6 °C)   TempSrc: Temporal Temporal  Temporal   SpO2: 100% 100%  100%  "  Weight:   102 kg (223 lb 15.8 oz)    Height:         Body mass index is 42.32 kg/m².      Intake/Output Summary (Last 24 hours) at 7/1/2024 0939  Last data filed at 7/1/2024 0926  Gross per 24 hour   Intake 940 ml   Output 450 ml   Net 490 ml       Weight change: -0.1 kg (-3.5 oz)    PHYSICAL EXAMINATION:  Gen: Awake, Alert, NAD  Head/eyes: AT/NC, pupils equal and round, Anicteric  ENT: mmm  Neck: Supple, No elevated JVP, trachea midline  Resp: CTA bilaterally no w/r/r  CV: irreg irreg +S1, S2, No m/r/g  Abd: Soft, NT/ND + BS  Ext: no LE edema bilaterally  Neuro: Follows commands, moves all extermities  Psych: Appropriate affect, happy mood, pleasant attitude, non-combative  Skin: warm; no rash, erythema or venous stasis changes on exposed skin    Lab Results:  Results from last 7 days   Lab Units 07/01/24  0528   WBC Thousand/uL 4.56   HEMOGLOBIN g/dL 8.8*   HEMATOCRIT % 27.8*   PLATELETS Thousands/uL 148*     Results from last 7 days   Lab Units 07/01/24  0528   POTASSIUM mmol/L 3.5   CHLORIDE mmol/L 108   CO2 mmol/L 28   BUN mg/dL 13   CREATININE mg/dL 1.47*   CALCIUM mg/dL 8.5   ALK PHOS U/L 39   ALT U/L 28   AST U/L 48*     No results found for: \"TROPONINT\"      Results from last 7 days   Lab Units 07/01/24  0528 06/30/24  0547 06/29/24  1912 06/29/24  1722 06/29/24  1450   CK TOTAL U/L 539*   < >  --   --   --    HS TNI 0HR ng/L  --   --   --   --  280*   HS TNI 2HR ng/L  --   --   --  323*  --    HS TNI 4HR ng/L  --   --  364*  --   --     < > = values in this interval not displayed.     Results from last 7 days   Lab Units 06/29/24  1450   INR  1.42*       Tele: AF w/ CVR    This note was completed in part utilizing M-Modal Fluency Direct Software.  Grammatical errors, random word insertions, spelling mistakes, and incomplete sentences may be an occasional consequence of this system secondary to software limitations, ambient noise, and hardware issues.  If you have any questions or concerns about the " content, text, or information contained within the body of this dictation, please contact the provider for clarification.

## 2024-07-01 NOTE — PROGRESS NOTES
[facility]  Progress Note  Name: Bhavna Al I  MRN: [id number]  Unit/Bed#: E4 -01 I Date of Admission: 6/29/2024   Date of Service: 7/1/2024 I Hospital Day: 2    Assessment & Plan   * Atrial fibrillation with RVR (HCC)  Assessment & Plan  Patient was admitted to [facility] 6/6/2024 to 6/18/2024 for MRSA bacteremia discharge to a rehab facility, presents back to the hospital with respiratory distress requiring BiPAP initially, found to be in A-fib with RVR which possible triggered CHF exacerbation  Initially on presentation heart rate in 150s, received a dose of Cardizem bolus currently heart rate in 100s  She is currently on metoprolol and has been controlled.  Cardiology is on board appreciate recommendations.  Patient received IV digoxin over the weekend  If patient requires additional rate control despite the use of metoprolol, consider oral digoxin  Continue Eliquis.      Pressure ulcer of left buttock, stage 3 (HCC)  Assessment & Plan  On admission.  Continue wound care.    Secondary adrenal insufficiency (HCC)  Assessment & Plan  Due to chronic prednisone use    Dysphagia  Assessment & Plan  On previous admission on dental soft per speech recommendation    MRSA bacteremia  Assessment & Plan  Recent MRSA bacteremia with acute osteomyelitis of thoracic spine  Due to rising CPK, IV daptomycin was changed back to IV vancomycin through 7/22/2024  Monitor ESR and CRP weekly  Repeat blood cultures  Patient has a right IJ.  ID on board appreciate recommendations.      Acute on chronic diastolic heart failure (HCC)  Assessment & Plan  Wt Readings from Last 3 Encounters:   07/01/24 102 kg (223 lb 15.8 oz)   06/18/24 108 kg (238 lb 8.6 oz)   06/08/24 103 kg (228 lb)     Patient has history of diastolic heart failure, echo 6/8/2024 showed ejection fraction of 65%  According to previous hospitalization her home regimen is Lasix 40 mg daily which was held in the setting of AMANDA and  reduced p.o. intake.  No diuretic on nursing home paperwork  Initially requiring BiPAP, currently on room air  Chest x-ray with vascular congestion    Currently diuretic on hold for renal recovery per cardiology recommendation   will defer diuretics to cardiology          Elevated troponin  Assessment & Plan  Suspect in the setting of A-fib with RVR and heart failure  EKG without acute ischemic changes  Continue telemetry    Acute kidney injury superimposed on CKD  (HCC)  Assessment & Plan  Lab Results   Component Value Date    EGFR 34 07/01/2024    EGFR 29 06/30/2024    EGFR 40 06/29/2024    CREATININE 1.47 (H) 07/01/2024    CREATININE 1.70 (H) 06/30/2024    CREATININE 1.28 06/29/2024     AMANDA on CKD most likely due to following depletion and diuresis  Renal function slowly recovering   currently diuretic on hold per cardiology recommendation for renal recovery  Follow BMP    Rheumatoid arthritis (HCC)  Assessment & Plan  Continue Plaquenil 200 mg twice daily and prednisone 5 mg twice daily  Follow-up OP rheumatologist           VTE Pharmacologic Prophylaxis:   Pharmacologic: Apixaban (Eliquis)  Mechanical VTE Prophylaxis in Place: Yes    Patient Centered Rounds: I have performed bedside rounds with nursing staff today.    Discussions with Specialists or Other Care Team Provider: Discussed with , RN    Education and Discussions with Family / Patient: Discussed with patient    Time Spent for Care:  35 minutes .  This time was spent on one or more of the following: performing physical exam; counseling and coordination of care; obtaining or reviewing history; documenting in the medical record; reviewing/ordering tests, medications or procedures; communicating with other healthcare professionals and discussing with patient's family/caregivers.    Current Length of Stay: 2 day(s)    Current Patient Status: Inpatient   Certification Statement: The patient will continue to require additional inpatient  hospital stay due to AMANDA on CKD, A-fib with RVR    Discharge Plan / Estimated Discharge Date: In 24 to 48 hours      Code Status: Level 1 - Full Code      Subjective:   Patient was seen and examined at bedside. The patient denies any chest pain, palpitation, shortness of breath.    Objective:     Vitals:   Temp (24hrs), Av.4 °F (36.3 °C), Min:96.6 °F (35.9 °C), Max:98.1 °F (36.7 °C)    Temp:  [96.6 °F (35.9 °C)-98.1 °F (36.7 °C)] 96.6 °F (35.9 °C)  HR:  [72-82] 81  Resp:  [18-20] 18  BP: ()/(59-73) 112/73  SpO2:  [100 %] 100 %  Body mass index is 42.32 kg/m².     Input and Output Summary (last 24 hours):       Intake/Output Summary (Last 24 hours) at 2024 1625  Last data filed at 2024 1600  Gross per 24 hour   Intake 1060 ml   Output 450 ml   Net 610 ml       Physical Exam:     Physical Exam  General: no acute distress  HEENT: NC/AT, PERRL, EOM - normal  Neck: Supple  Pulm/Chest: Normal chest wall expansion, clear breath sounds on both side  CVS: normal S1&S2, capillary refill <2s  Abd: soft, non tender, non distended, bowel sounds +  MSK: move all 4 extremities spontaneously  Skin: warm  CNS: no acute focal neuro deficit      Additional Data:     Labs:    Results from last 7 days   Lab Units 24  0528   WBC Thousand/uL 4.56   HEMOGLOBIN g/dL 8.8*   HEMATOCRIT % 27.8*   PLATELETS Thousands/uL 148*   SEGS PCT % 75   LYMPHO PCT % 13*   MONO PCT % 8   EOS PCT % 4     Results from last 7 days   Lab Units 24  0528   POTASSIUM mmol/L 3.5   CHLORIDE mmol/L 108   CO2 mmol/L 28   BUN mg/dL 13   CREATININE mg/dL 1.47*   CALCIUM mg/dL 8.5   ALK PHOS U/L 39   ALT U/L 28   AST U/L 48*     Results from last 7 days   Lab Units 24  1450   INR  1.42*       * I Have Reviewed All Lab Data Listed Above.  * Additional Pertinent Lab Tests Reviewed: All Labs For Current Hospital Admission Reviewed    Imaging:      I have reviewed pertinent imaging.      Recent Cultures (last 7 days):     Results from  last 7 days   Lab Units 06/29/24  1450 06/29/24  1449   BLOOD CULTURE  No Growth at 24 hrs. No Growth at 24 hrs.       Last 24 Hours Medication List:   Current Facility-Administered Medications   Medication Dose Route Frequency Provider Last Rate    acetaminophen  650 mg Oral Q4H PRN Shania Velarde MD      albuterol  2 puff Inhalation Q6H PRN Shania Velarde MD      ammonium lactate   Topical BID Genie Abbasi MD      apixaban  5 mg Oral BID Shania Velarde MD      hydroxychloroquine  200 mg Oral BID Shania Velarde MD      metoprolol tartrate  25 mg Oral Q12H [facility] Kostas Bruce DO      MAGALY ANTIFUNGAL   Topical BID Genie Abbasi MD      pantoprazole  40 mg Oral Early Morning Shania Velarde MD      predniSONE  5 mg Oral BID With Meals Shania Velarde MD      [START ON 7/2/2024] vancomycin  750 mg Intravenous Q24H AZIZA Barillas          Today, Patient Was Seen By: Genie Abbasi MD    ** Please Note: Dragon 360 Dictation voice to text software may have been used in the creation of this document. **

## 2024-07-01 NOTE — PROGRESS NOTES
Bhavna Al is a 73 y.o. female who is currently ordered Vancomycin IV with management by the Pharmacy Consult service.  Relevant clinical data and objective / subjective history reviewed.  Vancomycin Assessment:  Indication and Goal AUC/Trough: Bacteremia (goal -600, trough >10), -600, trough >10  Clinical Status:  new  Micro: New    Renal Function:  SCr: 1.47 mg/dL  CrCl: 36.3 mL/min  Renal replacement: Not on dialysis  Days of Therapy: 1  Current Dose: 1750mg IV once  Vancomycin Plan:  New Dosinmg IV q24h  Estimated AUC: 418 mcg*hr/mL  Estimated Trough: 13.2 mcg/mL  Next Level: 7/2 with am labs  Renal Function Monitoring: Daily BMP and UOP  Pharmacy will continue to follow closely for s/sx of nephrotoxicity, infusion reactions and appropriateness of therapy.  BMP and CBC will be ordered per protocol. We will continue to follow the patient’s culture results and clinical progress daily.    Wil Virk, Pharmacist

## 2024-07-01 NOTE — PLAN OF CARE
Problem: Potential for Falls  Goal: Patient will remain free of falls  Description: INTERVENTIONS:  - Educate patient/family on patient safety including physical limitations  - Instruct patient to call for assistance with activity   - Consult OT/PT to assist with strengthening/mobility   - Keep Call bell within reach  - Keep bed low and locked with side rails adjusted as appropriate  - Keep care items and personal belongings within reach  - Initiate and maintain comfort rounds  - Make Fall Risk Sign visible to staff  - Offer Toileting every 2 Hours, in advance of need  - Initiate/Maintain bed alarm  - Obtain necessary fall risk management equipment:   - Apply yellow socks and bracelet for high fall risk patients  - Consider moving patient to room near nurses station  Outcome: Progressing     Problem: Prexisting or High Potential for Compromised Skin Integrity  Goal: Skin integrity is maintained or improved  Description: INTERVENTIONS:  - Identify patients at risk for skin breakdown  - Assess and monitor skin integrity  - Assess and monitor nutrition and hydration status  - Monitor labs   - Assess for incontinence   - Turn and reposition patient  - Assist with mobility/ambulation  - Relieve pressure over bony prominences  - Avoid friction and shearing  - Provide appropriate hygiene as needed including keeping skin clean and dry  - Evaluate need for skin moisturizer/barrier cream  - Collaborate with interdisciplinary team   - Patient/family teaching  - Consider wound care consult   Outcome: Progressing     Problem: INFECTION - ADULT  Goal: Absence or prevention of progression during hospitalization  Description: INTERVENTIONS:  - Assess and monitor for signs and symptoms of infection  - Monitor lab/diagnostic results  - Monitor all insertion sites, i.e. indwelling lines, tubes, and drains  - Monitor endotracheal if appropriate and nasal secretions for changes in amount and color  - [place] appropriate  cooling/warming therapies per order  - Administer medications as ordered  - Instruct and encourage patient and family to use good hand hygiene technique  - Identify and instruct in appropriate isolation precautions for identified infection/condition  Outcome: Progressing  Goal: Absence of fever/infection during neutropenic period  Description: INTERVENTIONS:  - Monitor WBC    Outcome: Progressing     Problem: SAFETY ADULT  Goal: Patient will remain free of falls  Description: INTERVENTIONS:  - Educate patient/family on patient safety including physical limitations  - Instruct patient to call for assistance with activity   - Consult OT/PT to assist with strengthening/mobility   - Keep Call bell within reach  - Keep bed low and locked with side rails adjusted as appropriate  - Keep care items and personal belongings within reach  - Initiate and maintain comfort rounds  - Make Fall Risk Sign visible to staff  - Offer Toileting every 2 Hours, in advance of need  - Initiate/Maintain bed alarm  - Obtain necessary fall risk management equipment:   - Apply yellow socks and bracelet for high fall risk patients  - Consider moving patient to room near nurses station  Outcome: Progressing  Goal: Maintain or return to baseline ADL function  Description: INTERVENTIONS:  -  Assess patient's ability to carry out ADLs; assess patient's baseline for ADL function and identify physical deficits which impact ability to perform ADLs (bathing, care of mouth/teeth, toileting, grooming, dressing, etc.)  - Assess/evaluate cause of self-care deficits   - Assess range of motion  - Assess patient's mobility; develop plan if impaired  - Assess patient's need for assistive devices and provide as appropriate  - Encourage maximum independence but intervene and supervise when necessary  - Involve family in performance of ADLs  - Assess for home care needs following discharge   - Consider OT consult to assist with ADL evaluation and planning for  discharge  - Provide patient education as appropriate  Outcome: Progressing  Goal: Maintains/Returns to pre admission functional level  Description: INTERVENTIONS:  - Perform AM-PAC 6 Click Basic Mobility/ Daily Activity assessment daily.  - Set and communicate daily mobility goal to care team and patient/family/caregiver.   - Collaborate with rehabilitation services on mobility goals if consulted  - Perform Range of Motion 3 times a day.  - Reposition patient every 2 hours.  - Dangle patient 3 times a day  - Stand patient 3 times a day  - Ambulate patient 3 times a day  - Out of bed to chair 3 times a day   - Out of bed for meals 3 times a day  - Out of bed for toileting  - Record patient progress and toleration of activity level   Outcome: Progressing     Problem: DISCHARGE PLANNING  Goal: Discharge to home or other facility with appropriate resources  Description: INTERVENTIONS:  - Identify barriers to discharge w/patient and caregiver  - Arrange for needed discharge resources and transportation as appropriate  - Identify discharge learning needs (meds, wound care, etc.)  - Arrange for interpretive services to assist at discharge as needed  - Refer to Case Management Department for coordinating discharge planning if the patient needs post-hospital services based on physician/advanced practitioner order or complex needs related to functional status, cognitive ability, or social support system  Outcome: Progressing     Problem: Knowledge Deficit  Goal: Patient/family/caregiver demonstrates understanding of disease process, treatment plan, medications, and discharge instructions  Description: Complete learning assessment and assess knowledge base.  Interventions:  - Provide teaching at level of understanding  - Provide teaching via preferred learning methods  Outcome: Progressing     Problem: CARDIOVASCULAR - ADULT  Goal: Maintains optimal cardiac output and hemodynamic stability  Description: INTERVENTIONS:  -  Monitor I/O, vital signs and rhythm  - Monitor for S/S and trends of decreased cardiac output  - Administer and titrate ordered vasoactive medications to optimize hemodynamic stability  - Assess quality of pulses, skin color and temperature  - Assess for signs of decreased coronary artery perfusion  - Instruct patient to report change in severity of symptoms  Outcome: Progressing     Problem: RESPIRATORY - ADULT  Goal: Achieves optimal ventilation and oxygenation  Description: INTERVENTIONS:  - Assess for changes in respiratory status  - Assess for changes in mentation and behavior  - Position to facilitate oxygenation and minimize respiratory effort  - Oxygen administered by appropriate delivery if ordered  - Initiate smoking cessation education as indicated  - Encourage broncho-pulmonary hygiene including cough, deep breathe, Incentive Spirometry  - Assess the need for suctioning and aspirate as needed  - Assess and instruct to report SOB or any respiratory difficulty  - Respiratory Therapy support as indicated  Outcome: Progressing     Problem: SKIN/TISSUE INTEGRITY - ADULT  Goal: Skin Integrity remains intact(Skin Breakdown Prevention)  Description: Assess:  -Perform G[place] assessment every 12H  -Clean and moisturize skin every   -Inspect skin when repositioning, toileting, and assisting with ADLS  -Assess extremities for adequate circulation and sensation     Bed Management:  -Have minimal linens on bed & keep smooth, unwrinkled  -Change linens as needed when moist or perspiring  -Avoid sitting or lying in one position for more than 2 hours while in bed  -Keep HOB at 30 degrees     Toileting:  -Offer bedside commode  -Assess for incontinence every 2H  -Use incontinent care products after each incontinent episode such as blue chucks    Activity:  -Mobilize patient 3 times a day  -Encourage activity and walks on unit  -Encourage or provide ROM exercises   -Turn and reposition patient every 2 Hours  -Use  appropriate equipment to lift or move patient in bed  -Instruct/ Assist with weight shifting every 2H when out of bed in chair  -Consider limitation of chair time 2H hour intervals    Skin Care:  -Avoid use of baby powder, tape, friction and shearing, hot water or constrictive clothing  -Relieve pressure over bony prominences using allevyns and pillows  -Do not massage red bony areas    Next Steps:  -Teach patient strategies to minimize risks such as    -Consider consults to  interdisciplinary teams such as   Outcome: Progressing  Goal: Pressure injury heals and does not worsen  Description: Interventions:  - Implement low air loss mattress or specialty surface (Criteria met)  - Apply silicone foam dressing  - Instruct/assist with weight shifting every 120 minutes when in chair   - Limit chair time to 2 hour intervals  - Use special pressure reducing interventions such as chair cushion when in chair   - Apply fecal or urinary incontinence containment device   - Perform passive or active ROM every 6h  - Turn and reposition patient & offload bony prominences every 2 hours   - Utilize friction reducing device or surface for transfers   - Consider consults to  interdisciplinary teams such as   - Use incontinent care products after each incontinent episode such as orange barrier cream  - Consider nutrition services referral as needed  Outcome: Progressing

## 2024-07-01 NOTE — ASSESSMENT & PLAN NOTE
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  ID on board appreciate recommendations.

## 2024-07-01 NOTE — ASSESSMENT & PLAN NOTE
Continue Plaquenil 200 mg twice daily and prednisone 5 mg twice daily  Follow-up OP rheumatologist

## 2024-07-01 NOTE — ASSESSMENT & PLAN NOTE
Lab Results   Component Value Date    EGFR 34 07/01/2024    EGFR 29 06/30/2024    EGFR 40 06/29/2024    CREATININE 1.47 (H) 07/01/2024    CREATININE 1.70 (H) 06/30/2024    CREATININE 1.28 06/29/2024     AMANDA on CKD most likely due to following depletion and diuresis  Renal function slowly recovering   currently diuretic on hold per cardiology recommendation for renal recovery  Follow BMP

## 2024-07-02 LAB
ANION GAP SERPL CALCULATED.3IONS-SCNC: 5 MMOL/L (ref 4–13)
BASOPHILS # BLD AUTO: 0.02 THOUSANDS/ÂΜL (ref 0–0.1)
BASOPHILS NFR BLD AUTO: 1 % (ref 0–1)
BUN SERPL-MCNC: 11 MG/DL (ref 5–25)
CALCIUM SERPL-MCNC: 8 MG/DL (ref 8.4–10.2)
CHLORIDE SERPL-SCNC: 109 MMOL/L (ref 96–108)
CK SERPL-CCNC: 385 U/L (ref 26–192)
CO2 SERPL-SCNC: 28 MMOL/L (ref 21–32)
CREAT SERPL-MCNC: 1.16 MG/DL (ref 0.6–1.3)
EOSINOPHIL # BLD AUTO: 0.14 THOUSAND/ÂΜL (ref 0–0.61)
EOSINOPHIL NFR BLD AUTO: 4 % (ref 0–6)
ERYTHROCYTE [DISTWIDTH] IN BLOOD BY AUTOMATED COUNT: 16 % (ref 11.6–15.1)
GFR SERPL CREATININE-BSD FRML MDRD: 46 ML/MIN/1.73SQ M
GLUCOSE SERPL-MCNC: 73 MG/DL (ref 65–140)
HCT VFR BLD AUTO: 27.4 % (ref 34.8–46.1)
HGB BLD-MCNC: 8.7 G/DL (ref 11.5–15.4)
IMM GRANULOCYTES # BLD AUTO: 0.02 THOUSAND/UL (ref 0–0.2)
IMM GRANULOCYTES NFR BLD AUTO: 1 % (ref 0–2)
LYMPHOCYTES # BLD AUTO: 0.57 THOUSANDS/ÂΜL (ref 0.6–4.47)
LYMPHOCYTES NFR BLD AUTO: 15 % (ref 14–44)
MAGNESIUM SERPL-MCNC: 1.5 MG/DL (ref 1.9–2.7)
MCH RBC QN AUTO: 28.5 PG (ref 26.8–34.3)
MCHC RBC AUTO-ENTMCNC: 31.8 G/DL (ref 31.4–37.4)
MCV RBC AUTO: 90 FL (ref 82–98)
MONOCYTES # BLD AUTO: 0.38 THOUSAND/ÂΜL (ref 0.17–1.22)
MONOCYTES NFR BLD AUTO: 10 % (ref 4–12)
NEUTROPHILS # BLD AUTO: 2.63 THOUSANDS/ÂΜL (ref 1.85–7.62)
NEUTS SEG NFR BLD AUTO: 69 % (ref 43–75)
NRBC BLD AUTO-RTO: 0 /100 WBCS
PLATELET # BLD AUTO: 157 THOUSANDS/UL (ref 149–390)
PMV BLD AUTO: 12.4 FL (ref 8.9–12.7)
POTASSIUM SERPL-SCNC: 3.6 MMOL/L (ref 3.5–5.3)
RBC # BLD AUTO: 3.05 MILLION/UL (ref 3.81–5.12)
SODIUM SERPL-SCNC: 142 MMOL/L (ref 135–147)
VANCOMYCIN SERPL-MCNC: 15 UG/ML (ref 10–20)
WBC # BLD AUTO: 3.76 THOUSAND/UL (ref 4.31–10.16)

## 2024-07-02 PROCEDURE — 99232 SBSQ HOSP IP/OBS MODERATE 35: CPT | Performed by: INTERNAL MEDICINE

## 2024-07-02 PROCEDURE — 99233 SBSQ HOSP IP/OBS HIGH 50: CPT | Performed by: INTERNAL MEDICINE

## 2024-07-02 PROCEDURE — 83735 ASSAY OF MAGNESIUM: CPT | Performed by: INTERNAL MEDICINE

## 2024-07-02 PROCEDURE — 82550 ASSAY OF CK (CPK): CPT | Performed by: INTERNAL MEDICINE

## 2024-07-02 PROCEDURE — 80048 BASIC METABOLIC PNL TOTAL CA: CPT | Performed by: INTERNAL MEDICINE

## 2024-07-02 PROCEDURE — 85025 COMPLETE CBC W/AUTO DIFF WBC: CPT | Performed by: INTERNAL MEDICINE

## 2024-07-02 PROCEDURE — 80202 ASSAY OF VANCOMYCIN: CPT | Performed by: NURSE PRACTITIONER

## 2024-07-02 RX ORDER — FUROSEMIDE 20 MG/1
20 TABLET ORAL DAILY
Status: DISCONTINUED | OUTPATIENT
Start: 2024-07-02 | End: 2024-07-05 | Stop reason: HOSPADM

## 2024-07-02 RX ADMIN — APIXABAN 5 MG: 5 TABLET, FILM COATED ORAL at 20:09

## 2024-07-02 RX ADMIN — METOPROLOL TARTRATE 25 MG: 25 TABLET, FILM COATED ORAL at 09:56

## 2024-07-02 RX ADMIN — APIXABAN 5 MG: 5 TABLET, FILM COATED ORAL at 09:56

## 2024-07-02 RX ADMIN — FUROSEMIDE 20 MG: 20 TABLET ORAL at 18:22

## 2024-07-02 RX ADMIN — PREDNISONE 5 MG: 5 TABLET ORAL at 09:56

## 2024-07-02 RX ADMIN — PREDNISONE 5 MG: 5 TABLET ORAL at 18:22

## 2024-07-02 RX ADMIN — PANTOPRAZOLE SODIUM 40 MG: 40 TABLET, DELAYED RELEASE ORAL at 05:30

## 2024-07-02 RX ADMIN — HYDROXYCHLOROQUINE SULFATE 200 MG: 200 TABLET ORAL at 18:22

## 2024-07-02 RX ADMIN — MICONAZOLE NITRATE: 20 CREAM TOPICAL at 20:13

## 2024-07-02 RX ADMIN — METOPROLOL TARTRATE 25 MG: 25 TABLET, FILM COATED ORAL at 20:09

## 2024-07-02 RX ADMIN — MICONAZOLE NITRATE: 20 CREAM TOPICAL at 10:09

## 2024-07-02 RX ADMIN — AMMONIUM LACTATE: 12 CREAM TOPICAL at 20:11

## 2024-07-02 RX ADMIN — HYDROXYCHLOROQUINE SULFATE 200 MG: 200 TABLET ORAL at 09:56

## 2024-07-02 RX ADMIN — AMMONIUM LACTATE: 12 CREAM TOPICAL at 10:08

## 2024-07-02 RX ADMIN — VANCOMYCIN HYDROCHLORIDE 750 MG: 750 INJECTION, SOLUTION INTRAVENOUS at 13:58

## 2024-07-02 NOTE — ASSESSMENT & PLAN NOTE
Wt Readings from Last 3 Encounters:   07/01/24 102 kg (223 lb 15.8 oz)   06/18/24 108 kg (238 lb 8.6 oz)   06/08/24 103 kg (228 lb)     Patient has history of diastolic heart failure, echo 6/8/2024 showed ejection fraction of 65%  According to previous hospitalization her home regimen is Lasix 40 mg daily which was held in the setting of AMANDA and reduced p.o. intake.  No diuretic on nursing home paperwork  Initially requiring BiPAP, currently on room air  Chest x-ray with vascular congestion.  Repeat chest x-ray on 7/2/2024 showed increasing density in the right upper lobe with underlying congestive changes and small effusions. This likely represents asymmetric edema though developing pneumonia cannot be entirely excluded.   Cardiologist on board.  Restarted Lasix 20 mg daily  Follow CT chest without contrast

## 2024-07-02 NOTE — PROGRESS NOTES
Bhavna Al is a 73 y.o. female who is currently ordered Vancomycin IV with management by the Pharmacy Consult service.  Relevant clinical data and objective / subjective history reviewed.  Vancomycin Assessment:  Indication and Goal AUC/Trough: Bacteremia (goal -600, trough >10), -600, trough >10  Clinical Status:  stable  Micro: No Growth at 48 hrs.     Renal Function:  SCr: 1.16 mg/dL  CrCl: 46 mL/min  Renal replacement: Not on dialysis  Days of Therapy: 2  Current Dose: 1750mg IV once  Vancomycin Plan:  New Dosinmg IV q24h  Estimated AUC: 407 mcg*hr/mL  Estimated Trough: 12 mcg/mL  Next Level:  with am labs  Renal Function Monitoring: Daily BMP and UOP  Pharmacy will continue to follow closely for s/sx of nephrotoxicity, infusion reactions and appropriateness of therapy.  BMP and CBC will be ordered per protocol. We will continue to follow the patient’s culture results and clinical progress daily.    Wil Virk, Pharmacist

## 2024-07-02 NOTE — OCCUPATIONAL THERAPY NOTE
Occupational Therapy Screen:    Patient Name: Bhavna Al  Today's Date: 7/2/2024    OT consult received. Chart reviewed. Per chart: Pt is LTC resident at [facility] Pt is a Mirian lift, non ambulatory, wheel chair bound, assist of 1-2 ADLs. No skilled acute IP OT needs identified at this time. Will D/C OT.    Devorah Watkins, OTR/L

## 2024-07-02 NOTE — ASSESSMENT & PLAN NOTE
Lab Results   Component Value Date    EGFR 46 07/02/2024    EGFR 34 07/01/2024    EGFR 29 06/30/2024    CREATININE 1.16 07/02/2024    CREATININE 1.47 (H) 07/01/2024    CREATININE 1.70 (H) 06/30/2024     AMANDA on CKD most likely due to following depletion and diuresis  Renal function slowly recovering and currently back to baseline  P.o. Lasix restarted per cardiologist recommendations  Follow BMP

## 2024-07-02 NOTE — PROGRESS NOTES
Progress Note - Cardiology   Bhavna JANUSZ Al 73 y.o. female MRN: [id number]  Unit/Bed#: E4 -01 Encounter: [id number]          Assessment / Plan  Acute hypoxic respiratory failure   - Initially briefly treated with BiPAP  HFpEF: Signs of acute decompensation POA in the setting of rapid AF and prehospital d/c of routine diuretic   - prehospital diuretic: Typically Lasix 40 mg daily--> on none immediately prior to admission d/t AMANDA   - Echo 6/8/2024: LV normal size & thickness, EF 65% with no RWMA (though technically limited study).   Persistent atrial fibrillation: RVR POA   - Anticoagulation: Eliquis 5 mg twice daily   - AV blocking Rx PTA: Metoprolol 25 mg twice daily  Abnormal troponin - nonischemic myocardial injury   - HS Troponin 280, 323, 3 and 64 without angina or ischemic ECG change  Metabolic encephalopathy  CKD, AMANDA  MRSA bacteremia, thoracic spine osteomyelitis/discitis   - Hospitalized [facility] 6/6-18:   *2 blood cultures MRSA and coagulase-negative staph positive    *MRI spine 6/9/2024: Findings suspicious for T9/10 discitis/osteomyelitis    *Echo without signs of SBE    *Repeat blood cultures 6/10/2024 negative    *PICC line placed with planned IV vancomycin through 7/22/2024--> discharged to rehab facility--> subsequent leukopenia and AMANDA prompting change to daptomycin   - Current admission without leukocytosis or fever with negative procalcitonin   - Blood cultures x2, 6/29/2024 both without growth at 48 hours (updated 7/1/2024)  Rheumatoid arthritis, chronic immunosuppressive therapy (Plaquenil + prednisone)  Secondary adrenal insufficiency  Left buttock pressure ulcer POA  Immobile      - Sodium 142, potassium 3.6, CO2 28, BUN 11, creatinine 1.16    - WBCs 3.76, hemoglobin 8.7, platelets 157    - Maintaining normal room air saturations over the last 72 hours    With no diuretic since day of admission, renal function has improved with creatinine now normalized, normal room air saturations and  overall improvement in appearance of radiograph  - though still with signs of some interstitial edema and increased density in the right upper lobe of uncertain significance noting the following: Patient is afebrile without leukocytosis with negative procalcitonin and no other signs/symptoms of infection.  Aspiration plausible among differentials though the patient does not endorse any signs or symptoms to strongly suggest this  Would at this point reinstitute a daily dose of diuretic following clinical response   Consider chest CT for better assessment of pulmonary consolidation/edema  Atrial fibrillation now controlled on prehospital dose of beta-blocker with ongoing Eliquis for stroke risk reduction ~~> continue these  At this point can probably discontinue telemetry  Treatment of recent diagnosis of bacteremia and spinal osteomyelitis per primary service and infectious disease as delineated in their notes      Subjective:  No new complaint.  Breathing comfortably on room air denying chest pain, perceived tachycardia, any cough, wheeze, dysphonia or dysphagia    Vitals:  Vitals:    06/30/24 0534 07/01/24 0600   Weight: 102 kg (224 lb 3.3 oz) 102 kg (223 lb 15.8 oz)   ,  Vitals:    07/01/24 1916 07/02/24 0031 07/02/24 0333 07/02/24 0803   BP: 126/66 143/62 111/53 117/59   BP Location: Left arm Left arm Left arm Right arm   Pulse: 86 74 74 74   Resp: 18 18 18 18   Temp: (!) 96.7 °F (35.9 °C) 97.7 °F (36.5 °C) (!) 97.4 °F (36.3 °C) (!) 97.2 °F (36.2 °C)   TempSrc: Temporal Temporal Temporal Temporal   SpO2: 100% 100% 100%    Weight:       Height:           Exam:  General: Sitting in bed she appears comfortable and is appropriately conversant   Heart: Irregular with controlled rate, distant heart tones with no significant pathologic murmur appreciated  Lungs: Moderate excursion and air exchange with some basilar dulling but otherwise mostly clear   lower Limbs: Trace edema in the dependent tissues            Telemetry:       Atrial fibrillation ventricular rate trend around 80 bpm    Medications:    Current Facility-Administered Medications:     acetaminophen (TYLENOL) tablet 650 mg, 650 mg, Oral, Q4H PRN, Shania Velarde MD, 650 mg at 06/30/24 2041    albuterol (PROVENTIL HFA,VENTOLIN HFA) inhaler 2 puff, 2 puff, Inhalation, Q6H PRN, Shania Velarde MD    ammonium lactate (LAC-HYDRIN) 12 % cream, , Topical, BID, Genie Abbasi MD, Given at 07/02/24 1008    apixaban (ELIQUIS) tablet 5 mg, 5 mg, Oral, BID, Shania Velarde MD, 5 mg at 07/02/24 0956    hydroxychloroquine (PLAQUENIL) tablet 200 mg, 200 mg, Oral, BID, Shania Velarde MD, 200 mg at 07/02/24 0956    metoprolol tartrate (LOPRESSOR) tablet 25 mg, 25 mg, Oral, Q12H FAMILIA, Kostas Bruce DO, 25 mg at 07/02/24 0956    moisture barrier miconazole 2% cream (aka MAGALY MOISTURE BARRIER ANTIFUNGAL CREAM), , Topical, BID, Genie Abbasi MD, Given at 07/02/24 1009    pantoprazole (PROTONIX) EC tablet 40 mg, 40 mg, Oral, Early Morning, Shania Velarde MD, 40 mg at 07/02/24 0530    predniSONE tablet 5 mg, 5 mg, Oral, BID With Meals, Shania Velarde MD, 5 mg at 07/02/24 0956    vancomycin (VANCOCIN) IVPB (premix in dextrose) 750 mg 150 mL, 750 mg, Intravenous, Q24H, AZIZA Barillas      Labs/Data:        Results from last 7 days   Lab Units 07/02/24  0529 07/01/24  0528 06/30/24  0917 06/30/24  0547   WBC Thousand/uL 3.76* 4.56  --  6.68   HEMOGLOBIN g/dL 8.7* 8.8* 9.0* 8.5*   HEMATOCRIT % 27.4* 27.8* 28.9* 28.1*   PLATELETS Thousands/uL 157 148*  --  159     Results from last 7 days   Lab Units 07/02/24  0529 07/01/24  1636 07/01/24  0528 06/30/24  0547   POTASSIUM mmol/L 3.6  --  3.5 3.8   CHLORIDE mmol/L 109*  --  108 109*   CO2 mmol/L 28  --  28 26   BUN mg/dL 11  --  13 12   CREATININE mg/dL 1.16  --  1.47* 1.70*   CK TOTAL U/L 385* 554* 539* 325*

## 2024-07-02 NOTE — ASSESSMENT & PLAN NOTE
Patient was admitted to [facility] 6/6/2024 to 6/18/2024 for MRSA bacteremia discharge to a rehab facility, presents back to the hospital with respiratory distress requiring BiPAP initially, found to be in A-fib with RVR which possible triggered CHF exacerbation  Initially on presentation heart rate in 150s, received a dose of Cardizem bolus currently heart rate in 100s  She is currently on metoprolol and has been controlled.  Cardiology is on board appreciate recommendations.  Patient received IV digoxin over the weekend  If patient requires additional rate control despite the use of metoprolol, consider oral digoxin  Continue Eliquis.

## 2024-07-02 NOTE — PHYSICAL THERAPY NOTE
PHYSICAL THERAPY SCREEN          Patient Name: Bhavna Al  Today's Date: 7/2/2024 07/02/24 1018   PT Last Visit   PT Visit Date 07/02/24   Note Type   Note type Screen   Additional Comments PT consult recieved. per chart review from recent admissions, pt is LTC resident. dependent for care and requires vamshi lua. does not demonstrate need for acute PT services at this time. will dc orders and recommend vamshi lua w nsg/restorative.       Yady Gross, PT

## 2024-07-02 NOTE — CASE MANAGEMENT
Case Management Assessment & Discharge Planning Note    Patient name Bhavna Al  Location [facility] /E4 -* MRN [id number]  : 1950 Date 2024       Current Admission Date: 2024  Current Admission Diagnosis:Atrial fibrillation with RVR (Formerly [facility])   Patient Active Problem List    Diagnosis Date Noted Date Diagnosed    Atrial fibrillation with RVR (Formerly [facility]) 2024     Secondary adrenal insufficiency (Formerly [facility]) 2024     Pressure ulcer of left buttock, stage 3 (Formerly [facility]) 2024     Acute osteomyelitis of thoracic spine (Formerly [facility]) 2024     MRSA bacteremia 2024     Hypotension 2024     Dysphagia 2024     Deep tissue injury 2024     Hypernatremia 2024     Localized swelling on left hand 2023     Stage 3 chronic kidney disease (Formerly [facility]) 2023     Febrile illness 2023     Dermatitis associated with incontinence 2023     Right shoulder pain 12/15/2022     Abnormal urinalysis 2022     Ambulatory dysfunction 2022     Hyperuricemia 2022     Acute metabolic encephalopathy 2022     Acute on chronic diastolic heart failure (Formerly [facility]) 2022     Acute bronchitis 2022     Assistance needed with transportation 09/15/2022     Abnormal CT of the chest 2022     Gram-positive cocci bacteremia 2022     Psoriasis 2022     Elevated troponin 2022     COVID-19 2022     Acute kidney injury superimposed on CKD  (Formerly [facility]) 01/10/2022     Paroxysmal atrial fibrillation (Formerly [facility]) 01/10/2022     Hyperparathyroidism (Formerly [facility]) 2021     Murmur, cardiac 2021     BPPV (benign paroxysmal positional vertigo) 2018     Seasonal allergic rhinitis due to pollen 2018     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64         Current admission status: Inpatient       Preferred Pharmacy:   [id number] - [place], [place] - [address]  [address]  [place] [address]  Phone: [phone] Fax: [phone]    [facility] - [address] [address]  Phone: [phone] Fax: [phone]    Primary Care Provider: Nya Taveras DO    Primary Insurance: MEDICARE  Secondary Insurance: [facility]    ASSESSMENT:  Active Health Care Proxies       [facility] Representative - Child   Primary Phone: [phone] (Home)                           Readmission Root Cause  30 Day Readmission: Yes  Who directed you to return to the hospital?: Other (comment) (facility staff)  Did you understand whom to contact if you had questions or problems?: No (facility staff manages care)  Did you get your prescriptions before you left the hospital?: Yes  Were you able to get your prescriptions filled when you left the hospital?: Yes  Did you take your medications as prescribed?: Yes  Were you able to get to your follow-up appointments?: No (seen at the facility)  Reason:: Other (comment) (lives in a nursing home)  During previous admission, was a post-acute recommendation made?: No  Patient was readmitted due to: Increasing SOB and desat  Action Plan: Treat medically and return to nursing home wihen stable.    Patient Information  Admitted from:: Facility  Mental Status: Alert, Confused  During Assessment patient was accompanied by: Not accompanied during assessment  Assessment information  provided by:: Daughter  Primary Caregiver: Other (Comment) (facility staff)  Caregiver's Name:: [facility]  Caregiver's Relationship to Patient:: Family Member  Caregiver's Telephone Number:: [phone]  Isaiah-dtr  Support Systems: Daughter, Family members, Other (Comment)  [facility] of Residence: [place]  What [facility] do you live in?: [place]  Home entry access options. Select all that apply.: No steps to enter home  Type of Current Residence: Other (Comment) (nursing home)  Living Arrangements: Other (Comment) (lives in  a facility)  Is patient a ?: No    Activities of Daily Living Prior to Admission  Functional Status: Assistance  Completes ADLs independently?: No  Level of ADL dependence: Assistance  Ambulates independently?: No  Level of ambulatory dependence: Total Dependent  Does patient use assisted devices?: Yes  Assisted Devices (DME) used: Wheelchair, Mirian lift  Does patient currently own DME?: No  Does patient currently have HHC?: No         Patient Information Continued  Income Source: Pension/skilled nursing  Does patient have prescription coverage?: Yes  Does patient receive dialysis treatments?: No  Does patient have a history of substance abuse?: No  Does patient have a history of Mental Health Diagnosis?: No                Social Determinants of Health (SDOH)      Flowsheet Row Most Recent Value   Housing Stability    In the last 12 months, was there a time when you were not able to pay the mortgage or rent on time? N   In the past 12 months, how many times have you moved where you were living? 1   At any time in the past 12 months, were you homeless or living in a shelter (including now)? N   Transportation Needs    In the past 12 months, has lack of transportation kept you from meetings, work, or from getting things needed for daily living? No   Food Insecurity    Within the past 12 months, you worried that your food would run out before you got the money to buy more. Never true   Within the  past 12 months, the food you bought just didn't last and you didn't have money to get more. Never true   Utilities    In the past 12 months has the electric, gas, oil, or water company threatened to shut off services in your home? No            DISCHARGE DETAILS:    Discharge planning discussed with:: [place]  [place] of Choice: Yes     CM contacted family/caregiver?: Yes     Did patient/caregiver verbalize understanding of patient care needs?: Yes  Were patient/caregiver advised of the risks associated with not following Treatment Team discharge recommendations?: Yes    Contacts  Patient Contacts: Rhoda Al (Child)  [phone]  Relationship to Patient:: Family  Contact Method: Phone  Reason/Outcome: Discharge Planning    Requested Home Health Care         Is the patient interested in HHC at discharge?: No    DME Referral Provided  Referral made for DME?: No              Treatment Team Recommendation: Facility Return  Discharge Destination Plan:: Facility Return                                         Additional Comments: CM met with the pt.  She is a long term pt at [facility] and was admitted due to increasing SOB.  Brandobtly was receiving Long term IV atbs at facility.  Pt unable to answer all assessments questions.  Dtr was called and verified information.  Per dtr she has been mostly bed bound for last 6 months.  Staff uses a Mirian lift to get OOB in WC when she gets out.  Plan is to return to facility.  Per dtr she has been there about 2 years.  [facility] made aware via [facility].  Dtr did inform this CM that [facility] takes several days to get medicaitons on the weekend- that come from [place] pharmacy, and suggests arranging discharge after the weekend.  CM to follow.

## 2024-07-02 NOTE — PLAN OF CARE
Problem: Potential for Falls  Goal: Patient will remain free of falls  Description: INTERVENTIONS:  - Educate patient/family on patient safety including physical limitations  - Instruct patient to call for assistance with activity   - Consult OT/PT to assist with strengthening/mobility   - Keep Call bell within reach  - Keep bed low and locked with side rails adjusted as appropriate  - Keep care items and personal belongings within reach  - Initiate and maintain comfort rounds  - Make Fall Risk Sign visible to staff  - Offer Toileting every 2 Hours, in advance of need  - Initiate/Maintain bed alarm  - Obtain necessary fall risk management equipment:   - Apply yellow socks and bracelet for high fall risk patients  - Consider moving patient to room near nurses station  Outcome: Progressing     Problem: Prexisting or High Potential for Compromised Skin Integrity  Goal: Skin integrity is maintained or improved  Description: INTERVENTIONS:  - Identify patients at risk for skin breakdown  - Assess and monitor skin integrity  - Assess and monitor nutrition and hydration status  - Monitor labs   - Assess for incontinence   - Turn and reposition patient  - Assist with mobility/ambulation  - Relieve pressure over bony prominences  - Avoid friction and shearing  - Provide appropriate hygiene as needed including keeping skin clean and dry  - Evaluate need for skin moisturizer/barrier cream  - Collaborate with interdisciplinary team   - Patient/family teaching  - Consider wound care consult   Outcome: Progressing     Problem: INFECTION - ADULT  Goal: Absence or prevention of progression during hospitalization  Description: INTERVENTIONS:  - Assess and monitor for signs and symptoms of infection  - Monitor lab/diagnostic results  - Monitor all insertion sites, i.e. indwelling lines, tubes, and drains  - Monitor endotracheal if appropriate and nasal secretions for changes in amount and color  - [place] appropriate  cooling/warming therapies per order  - Administer medications as ordered  - Instruct and encourage patient and family to use good hand hygiene technique  - Identify and instruct in appropriate isolation precautions for identified infection/condition  Outcome: Progressing  Goal: Absence of fever/infection during neutropenic period  Description: INTERVENTIONS:  - Monitor WBC    Outcome: Progressing     Problem: SAFETY ADULT  Goal: Patient will remain free of falls  Description: INTERVENTIONS:  - Educate patient/family on patient safety including physical limitations  - Instruct patient to call for assistance with activity   - Consult OT/PT to assist with strengthening/mobility   - Keep Call bell within reach  - Keep bed low and locked with side rails adjusted as appropriate  - Keep care items and personal belongings within reach  - Initiate and maintain comfort rounds  - Make Fall Risk Sign visible to staff  - Offer Toileting every 2 Hours, in advance of need  - Initiate/Maintain bed alarm  - Obtain necessary fall risk management equipment:   - Apply yellow socks and bracelet for high fall risk patients  - Consider moving patient to room near nurses station  Outcome: Progressing  Goal: Maintain or return to baseline ADL function  Description: INTERVENTIONS:  -  Assess patient's ability to carry out ADLs; assess patient's baseline for ADL function and identify physical deficits which impact ability to perform ADLs (bathing, care of mouth/teeth, toileting, grooming, dressing, etc.)  - Assess/evaluate cause of self-care deficits   - Assess range of motion  - Assess patient's mobility; develop plan if impaired  - Assess patient's need for assistive devices and provide as appropriate  - Encourage maximum independence but intervene and supervise when necessary  - Involve family in performance of ADLs  - Assess for home care needs following discharge   - Consider OT consult to assist with ADL evaluation and planning for  discharge  - Provide patient education as appropriate  Outcome: Progressing  Goal: Maintains/Returns to pre admission functional level  Description: INTERVENTIONS:  - Perform AM-PAC 6 Click Basic Mobility/ Daily Activity assessment daily.  - Set and communicate daily mobility goal to care team and patient/family/caregiver.   - Collaborate with rehabilitation services on mobility goals if consulted  - Perform Range of Motion 3 times a day.  - Reposition patient every 2 hours.  - Dangle patient 3 times a day  - Stand patient 3 times a day  - Ambulate patient 3 times a day  - Out of bed to chair 3 times a day   - Out of bed for meals 3 times a day  - Out of bed for toileting  - Record patient progress and toleration of activity level   Outcome: Progressing     Problem: DISCHARGE PLANNING  Goal: Discharge to home or other facility with appropriate resources  Description: INTERVENTIONS:  - Identify barriers to discharge w/patient and caregiver  - Arrange for needed discharge resources and transportation as appropriate  - Identify discharge learning needs (meds, wound care, etc.)  - Arrange for interpretive services to assist at discharge as needed  - Refer to Case Management Department for coordinating discharge planning if the patient needs post-hospital services based on physician/advanced practitioner order or complex needs related to functional status, cognitive ability, or social support system  Outcome: Progressing     Problem: Knowledge Deficit  Goal: Patient/family/caregiver demonstrates understanding of disease process, treatment plan, medications, and discharge instructions  Description: Complete learning assessment and assess knowledge base.  Interventions:  - Provide teaching at level of understanding  - Provide teaching via preferred learning methods  Outcome: Progressing     Problem: CARDIOVASCULAR - ADULT  Goal: Maintains optimal cardiac output and hemodynamic stability  Description: INTERVENTIONS:  -  Monitor I/O, vital signs and rhythm  - Monitor for S/S and trends of decreased cardiac output  - Administer and titrate ordered vasoactive medications to optimize hemodynamic stability  - Assess quality of pulses, skin color and temperature  - Assess for signs of decreased coronary artery perfusion  - Instruct patient to report change in severity of symptoms  Outcome: Progressing     Problem: RESPIRATORY - ADULT  Goal: Achieves optimal ventilation and oxygenation  Description: INTERVENTIONS:  - Assess for changes in respiratory status  - Assess for changes in mentation and behavior  - Position to facilitate oxygenation and minimize respiratory effort  - Oxygen administered by appropriate delivery if ordered  - Initiate smoking cessation education as indicated  - Encourage broncho-pulmonary hygiene including cough, deep breathe, Incentive Spirometry  - Assess the need for suctioning and aspirate as needed  - Assess and instruct to report SOB or any respiratory difficulty  - Respiratory Therapy support as indicated  Outcome: Progressing     Problem: SKIN/TISSUE INTEGRITY - ADULT  Goal: Skin Integrity remains intact(Skin Breakdown Prevention)  Description: Assess:  -Perform Gagan assessment every 12hr  -Clean and moisturize skin every 12hr   -Inspect skin when repositioning, toileting, and assisting with ADLS  -Assess extremities for adequate circulation and sensation     Bed Management:  -Have minimal linens on bed & keep smooth, unwrinkled  -Change linens as needed when moist or perspiring  -Avoid sitting or lying in one position for more than 2 hours while in bed  -Keep HOB at 30 degrees     Toileting:  -Offer bedside commode  -Assess for incontinence every 2 hr  -Use incontinent care products after each incontinent episode such as     Activity:  -Mobilize patient 3 times a day  -Encourage activity and walks on unit  -Encourage or provide ROM exercises   -Turn and reposition patient every 2 Hours  -Use appropriate  equipment to lift or move patient in bed  -Instruct/ Assist with weight shifting every 2h when out of bed in chair  -Consider limitation of chair time 120 hour intervals    Skin Care:  -Avoid use of baby powder, tape, friction and shearing, hot water or constrictive clothing  -Relieve pressure over bony prominences using   -Do not massage red bony areas    Next Steps:  -Teach patient strategies to minimize risks such as    -Consider consults to  interdisciplinary teams such as   Outcome: Progressing  Goal: Pressure injury heals and does not worsen  Description: Interventions:  - Implement low air loss mattress or specialty surface (Criteria met)  - Apply silicone foam dressing  - Instruct/assist with weight shifting every 120 minutes when in chair   - Limit chair time to 2 hour intervals  - Apply fecal or urinary incontinence containment device   - Perform passive or active ROM every 8h  - Turn and reposition patient & offload bony prominences every 2 hours   - Utilize friction reducing device or surface for transfers   - Consider consults to  interdisciplinary teams such as   - Use incontinent care products after each incontinent episode such as   - Consider nutrition services referral as needed  Outcome: Progressing     Problem: Nutrition/Hydration-ADULT  Goal: Nutrient/Hydration intake appropriate for improving, restoring or maintaining nutritional needs  Description: Monitor and assess patient's nutrition/hydration status for malnutrition. Collaborate with interdisciplinary team and initiate plan and interventions as ordered.  Monitor patient's weight and dietary intake as ordered or per policy. Utilize nutrition screening tool and intervene as necessary. Determine patient's food preferences and provide high-protein, high-caloric foods as appropriate.     INTERVENTIONS:  - Monitor oral intake, urinary output, labs, and treatment plans  - Assess nutrition and hydration status and recommend course of action  -  Evaluate amount of meals eaten  - Assist patient with eating if necessary   - Allow adequate time for meals  - Recommend/ encourage appropriate diets, oral nutritional supplements, and vitamin/mineral supplements  - Order, calculate, and assess calorie counts as needed  - Recommend, monitor, and adjust tube feedings and TPN/PPN based on assessed needs  - Assess need for intravenous fluids  - Provide specific nutrition/hydration education as appropriate  - Include patient/family/caregiver in decisions related to nutrition  Outcome: Progressing

## 2024-07-02 NOTE — PROGRESS NOTES
Progress Note - Infectious Disease   Bhavna Al 73 y.o. female MRN: [id number]  Unit/Bed#: E4 -01 Encounter: [id number]    Impression/Plan:  1. MRSA bacteremia and thoracic spine osteomyelitis, under active treatment.  Diagnosed on last admission.  Patient had initially been on vancomycin but developed issues with leukopenia and possible renal function issue, which led to transition to daptomycin. She is now back inpatient from facility due to acute hypoxic respiratory failure, with additional AMANDA. PICC was removed. Admission blood cultures are negative >48 hours. She continued Daptomycin but developed increasing CK. She has been transitioned back to IV vancomycin and appears to be tolerating this antibiotic without difficulty. Pharmacy has been consulted for guidance on dosing. She should continue this antibiotic for now with plans for treatment through 7/22/2024.  -continue IV vancomycin  -continue pharmacy consult for guidance on vancomycin dosage  -monitor CBCD, creatinine, and vancomycin trough  -follow up admission blood cultures  -monitor vitals  -patient will need new PICC placed as she will need to continue her plan for 6 weeks of IV antibiotic treatment through 7/22/2024  -she will need follow up with the outpatient ID office after discharge, I will coordinate and place information in her discharge planning      2. Acute hypoxic respiratory failure.  Suspect that this is related to patient's underlying volume status as she had recently been off diuretics.  Respiratory status is improving again today. This morning her O2 saturation is stable on room air.  -volume management per SLIM  -monitor vitals  -monitor respiratory status     3. Acute metabolic encephalopathy.  Patient very alert and interactive during my visit but is still somewhat confused about having an active infection and needing ongoing treatment.   -antibiotic as above  -recommend aspiration precautions   -serial neuro exams  -monitor  "mood and mental status  -supportive care     4. Acute kidney injury. On chronic kidney disease.  This impacts antibiotic dosing. Upon review of her available medical records it appears her baseline creatinine is approximately 1.1-1.2. Creatinine appears to have peaked at 1.7. This is likely due to volume issues. Also consider role of recent antibiotic use. Her creatinine is trending down this morning, was 1.16 today.   -monitor creatinine  -dose adjust antibiotic for renal function as needed  -avoid nephrotoxins  -volume management per primary service     5.  Rheumatoid arthritis on immunosuppressive therapy.  Patient chronically on Plaquenil and prednisone. This is risk factor for infection above  -recommend ongoing follow up with rheumatology after discharge     Above plan was discussed in detail with patient at the bedside.  Above plan was discussed in detail with SLIM who agrees with plan for ongoing antibiotic treatment.    Antibiotics:  Vancomycin 2  Antibiotics 25    Subjective:  Patient states, \"I guess I'm ok, I really am feeling well.\" She denies chills, sweats, shakes, nausea, vomiting, abdominal pain, cough, shortness of breath, and chest pain. She denies headache and dizziness. She denies new aches and pains. She has no irritation from her central line. No new symptoms.    Objective:  Vitals:  Temp:  [96.6 °F (35.9 °C)-97.8 °F (36.6 °C)] 97.4 °F (36.3 °C)  HR:  [72-86] 74  Resp:  [18] 18  BP: ()/(53-73) 111/53  SpO2:  [100 %] 100 %  Temp (24hrs), Av.2 °F (36.2 °C), Min:96.6 °F (35.9 °C), Max:97.8 °F (36.6 °C)  Current: Temperature: (!) 97.4 °F (36.3 °C)    Physical Exam:   General Appearance:  Alert but appearing slower today, interactive with some baseline confusion, nontoxic, in no acute distress. She appears comfortable semisupine in bed. She appears chronically ill and debilitated.   Throat: Oropharynx moist without lesions.    Lungs:   Decreased throughout to auscultation bilaterally; no " wheezes, rhonchi or rales; respirations unlabored on room air.   Heart:  Irregular; no murmur, rub or gallop.   Abdomen:   Soft, non-tender, non-distended, positive bowel sounds.     Extremities: No clubbing or cyanosis, no edema.   Skin: No new rashes noted on exposed skin. Dry skin with some patches of scaling.      Labs, Imaging, & Other studies:   All pertinent labs and imaging studies were personally reviewed  Results from last 7 days   Lab Units 07/02/24  0529 07/01/24 0528 06/30/24  0917 06/30/24  0547   WBC Thousand/uL 3.76* 4.56  --  6.68   HEMOGLOBIN g/dL 8.7* 8.8* 9.0* 8.5*   PLATELETS Thousands/uL 157 148*  --  159     Results from last 7 days   Lab Units 07/02/24  0529 07/01/24  0528 06/30/24  0547 06/29/24  1450   POTASSIUM mmol/L 3.6 3.5   < > 4.9   CHLORIDE mmol/L 109* 108   < > 106   CO2 mmol/L 28 28   < > 22   BUN mg/dL 11 13   < > 10   CREATININE mg/dL 1.16 1.47*   < > 1.28   EGFR ml/min/1.73sq m 46 34   < > 40   CALCIUM mg/dL 8.0* 8.5   < > 8.9   AST U/L  --  48*  --  60*   ALT U/L  --  28  --  31   ALK PHOS U/L  --  39  --  60    < > = values in this interval not displayed.     Results from last 7 days   Lab Units 06/29/24  1450 06/29/24  1449   BLOOD CULTURE  No Growth at 48 hrs. No Growth at 48 hrs.     Results from last 7 days   Lab Units 06/29/24  1450   PROCALCITONIN ng/ml 0.23

## 2024-07-02 NOTE — ASSESSMENT & PLAN NOTE
Recent MRSA bacteremia with acute osteomyelitis of thoracic spine  Due to rising CPK, IV daptomycin was changed back to IV vancomycin through 7/22/2024  Monitor ESR and CRP weekly  Repeat blood cultures showed no growth at 48 hours  Patient has a right IJ.  The facility will not be able to handle right IJ line.  Plan to remove IJ and PICC line placement tomorrow.  ID on board appreciate recommendations.

## 2024-07-02 NOTE — PROGRESS NOTES
[facility]  Progress Note  Name: Bhavna Al I  MRN: [id number]  Unit/Bed#: E4 -01 I Date of Admission: 6/29/2024   Date of Service: 7/2/2024 I Hospital Day: 3    Assessment & Plan   * Atrial fibrillation with RVR (HCC)  Assessment & Plan  Patient was admitted to [facility] 6/6/2024 to 6/18/2024 for MRSA bacteremia discharge to a rehab facility, presents back to the hospital with respiratory distress requiring BiPAP initially, found to be in A-fib with RVR which possible triggered CHF exacerbation  Initially on presentation heart rate in 150s, received a dose of Cardizem bolus currently heart rate in 100s  She is currently on metoprolol and has been controlled.  Cardiology is on board appreciate recommendations.  Patient received IV digoxin over the weekend  If patient requires additional rate control despite the use of metoprolol, consider oral digoxin  Continue Eliquis.      Pressure ulcer of left buttock, stage 3 (HCC)  Assessment & Plan  On admission.  Continue wound care.    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  The facility will not be able to handle right IJ line.  Plan to remove IJ and PICC line placement tomorrow.  ID on board appreciate recommendations.       Acute on chronic diastolic heart failure (HCC)  Assessment & Plan  Wt Readings from Last 3 Encounters:   07/01/24 102 kg (223 lb 15.8 oz)   06/18/24 108 kg (238 lb 8.6 oz)   06/08/24 103 kg (228 lb)     Patient has history of diastolic heart failure, echo 6/8/2024 showed  ejection fraction of 65%  According to previous hospitalization her home regimen is Lasix 40 mg daily which was held in the setting of AMANDA and reduced p.o. intake.  No diuretic on nursing home paperwork  Initially requiring BiPAP, currently on room air  Chest x-ray with vascular congestion.  Repeat chest x-ray on 7/2/2024 showed increasing density in the right upper lobe with underlying congestive changes and small effusions. This likely represents asymmetric edema though developing pneumonia cannot be entirely excluded.   Cardiologist on board.  Restarted Lasix 20 mg daily  Follow CT chest without contrast          Psoriasis  Assessment & Plan  Patient has psoriasis rash  Patient will need to follow-up with outpatient rheumatologist    Elevated troponin  Assessment & Plan  Suspect in the setting of A-fib with RVR and heart failure  EKG without acute ischemic changes  Discontinue telemetry    Acute kidney injury superimposed on CKD  (HCC)  Assessment & Plan  Lab Results   Component Value Date    EGFR 46 07/02/2024    EGFR 34 07/01/2024    EGFR 29 06/30/2024    CREATININE 1.16 07/02/2024    CREATININE 1.47 (H) 07/01/2024    CREATININE 1.70 (H) 06/30/2024     AMANDA on CKD most likely due to following depletion and diuresis  Renal function slowly recovering and currently back to baseline  P.o. Lasix restarted per cardiologist recommendations  Follow BMP    Rheumatoid arthritis (HCC)  Assessment & Plan  Continue Plaquenil 200 mg twice daily and prednisone 5 mg twice daily  Follow-up OP rheumatologist           VTE Pharmacologic Prophylaxis:   Pharmacologic: Apixaban (Eliquis)  Mechanical VTE Prophylaxis in Place: Yes    Patient Centered Rounds: I have performed bedside rounds with nursing staff today.    Discussions with Specialists or Other Care Team Provider: Discussed with , RN    Education and Discussions with Family / Patient: Discussed with patient    Time Spent for Care:  35 minutes .  This time was  spent on one or more of the following: performing physical exam; counseling and coordination of care; obtaining or reviewing history; documenting in the medical record; reviewing/ordering tests, medications or procedures; communicating with other healthcare professionals and discussing with patient's family/caregivers.    Current Length of Stay: 3 day(s)    Current Patient Status: Inpatient   Certification Statement: The patient will continue to require additional inpatient hospital stay due to PICC line placement    Discharge Plan / Estimated Discharge Date: In 24 to 48 hours      Code Status: Level 1 - Full Code      Subjective:   Patient was seen and examined at bedside. The patient denies any chest pain, palpitation, shortness of breath, N/V, abd pain.  Patient has forgetful episodes.      Objective:     Vitals:   Temp (24hrs), Av.4 °F (36.3 °C), Min:96.7 °F (35.9 °C), Max:97.7 °F (36.5 °C)    Temp:  [96.7 °F (35.9 °C)-97.7 °F (36.5 °C)] 97.5 °F (36.4 °C)  HR:  [74-86] 83  Resp:  [18] 18  BP: (108-143)/(53-66) 117/58  SpO2:  [99 %-100 %] 99 %  Body mass index is 42.32 kg/m².     Input and Output Summary (last 24 hours):       Intake/Output Summary (Last 24 hours) at 2024 1609  Last data filed at 2024 1508  Gross per 24 hour   Intake 1100 ml   Output 450 ml   Net 650 ml       Physical Exam:     Physical Exam  General: no acute distress  HEENT: NC/AT, PERRL, EOM - normal  Neck: Supple  Pulm/Chest: Normal chest wall expansion, clear breath sounds on both side  CVS: normal S1&S2, capillary refill <2s  Abd: soft, non tender, non distended, bowel sounds +  MSK: move all 4 extremities spontaneously  Skin: warm  CNS: No acute focal neurological deficit      Additional Data:     Labs:    Results from last 7 days   Lab Units 24  0529   WBC Thousand/uL 3.76*   HEMOGLOBIN g/dL 8.7*   HEMATOCRIT % 27.4*   PLATELETS Thousands/uL 157   SEGS PCT % 69   LYMPHO PCT % 15   MONO PCT % 10   EOS PCT % 4     Results  from last 7 days   Lab Units 07/02/24  0529 07/01/24  0528   POTASSIUM mmol/L 3.6 3.5   CHLORIDE mmol/L 109* 108   CO2 mmol/L 28 28   BUN mg/dL 11 13   CREATININE mg/dL 1.16 1.47*   CALCIUM mg/dL 8.0* 8.5   ALK PHOS U/L  --  39   ALT U/L  --  28   AST U/L  --  48*     Results from last 7 days   Lab Units 06/29/24  1450   INR  1.42*       * I Have Reviewed All Lab Data Listed Above.  * Additional Pertinent Lab Tests Reviewed: All Labs For Current Hospital Admission Reviewed    Imaging:      I have reviewed pertinent imaging.      Recent Cultures (last 7 days):     Results from last 7 days   Lab Units 06/29/24  1450 06/29/24  1449   BLOOD CULTURE  No Growth at 48 hrs. No Growth at 48 hrs.       Last 24 Hours Medication List:   Current Facility-Administered Medications   Medication Dose Route Frequency Provider Last Rate    acetaminophen  650 mg Oral Q4H PRN Shania Velarde MD      albuterol  2 puff Inhalation Q6H PRN Shania Velarde MD      ammonium lactate   Topical BID Genie Abbasi MD      apixaban  5 mg Oral BID Shania Velarde MD      furosemide  20 mg Oral Daily Kostas Bruce DO      hydroxychloroquine  200 mg Oral BID Shania Velarde MD      metoprolol tartrate  25 mg Oral Q12H FAMILIA Kostas Bruce DO      MAAGLY ANTIFUNGAL   Topical BID Genie Abbasi MD      pantoprazole  40 mg Oral Early Morning Shania Velarde MD      predniSONE  5 mg Oral BID With Meals Shania Velarde MD      vancomycin  750 mg Intravenous Q24H AZIZA Barillas 750 mg (07/02/24 2053)        Today, Patient Was Seen By: Genie Abbasi MD    ** Please Note: Dragon 360 Dictation voice to text software may have been used in the creation of this document. **

## 2024-07-02 NOTE — ASSESSMENT & PLAN NOTE
Suspect in the setting of A-fib with RVR and heart failure  EKG without acute ischemic changes  Discontinue telemetry

## 2024-07-03 ENCOUNTER — APPOINTMENT (INPATIENT)
Dept: CT IMAGING | Facility: HOSPITAL | Age: 74
DRG: 291 | End: 2024-07-03
Payer: MEDICARE

## 2024-07-03 ENCOUNTER — APPOINTMENT (INPATIENT)
Dept: RADIOLOGY | Facility: HOSPITAL | Age: 74
DRG: 291 | End: 2024-07-03
Payer: MEDICARE

## 2024-07-03 PROBLEM — N17.9 ACUTE KIDNEY INJURY SUPERIMPOSED ON CKD  (HCC): Status: RESOLVED | Noted: 2022-01-10 | Resolved: 2024-07-03

## 2024-07-03 PROBLEM — N18.9 ACUTE KIDNEY INJURY SUPERIMPOSED ON CKD  (HCC): Status: RESOLVED | Noted: 2022-01-10 | Resolved: 2024-07-03

## 2024-07-03 LAB
ANION GAP SERPL CALCULATED.3IONS-SCNC: 7 MMOL/L (ref 4–13)
BASOPHILS # BLD AUTO: 0.03 THOUSANDS/ÂΜL (ref 0–0.1)
BASOPHILS NFR BLD AUTO: 1 % (ref 0–1)
BUN SERPL-MCNC: 13 MG/DL (ref 5–25)
CALCIUM SERPL-MCNC: 8.4 MG/DL (ref 8.4–10.2)
CHLORIDE SERPL-SCNC: 108 MMOL/L (ref 96–108)
CK SERPL-CCNC: 149 U/L (ref 26–192)
CO2 SERPL-SCNC: 26 MMOL/L (ref 21–32)
CREAT SERPL-MCNC: 1.21 MG/DL (ref 0.6–1.3)
EOSINOPHIL # BLD AUTO: 0.12 THOUSAND/ÂΜL (ref 0–0.61)
EOSINOPHIL NFR BLD AUTO: 3 % (ref 0–6)
ERYTHROCYTE [DISTWIDTH] IN BLOOD BY AUTOMATED COUNT: 16 % (ref 11.6–15.1)
GFR SERPL CREATININE-BSD FRML MDRD: 43 ML/MIN/1.73SQ M
GLUCOSE SERPL-MCNC: 86 MG/DL (ref 65–140)
HCT VFR BLD AUTO: 28.4 % (ref 34.8–46.1)
HGB BLD-MCNC: 8.8 G/DL (ref 11.5–15.4)
IMM GRANULOCYTES # BLD AUTO: 0.03 THOUSAND/UL (ref 0–0.2)
IMM GRANULOCYTES NFR BLD AUTO: 1 % (ref 0–2)
LYMPHOCYTES # BLD AUTO: 0.5 THOUSANDS/ÂΜL (ref 0.6–4.47)
LYMPHOCYTES NFR BLD AUTO: 13 % (ref 14–44)
MCH RBC QN AUTO: 27.5 PG (ref 26.8–34.3)
MCHC RBC AUTO-ENTMCNC: 31 G/DL (ref 31.4–37.4)
MCV RBC AUTO: 89 FL (ref 82–98)
MONOCYTES # BLD AUTO: 0.36 THOUSAND/ÂΜL (ref 0.17–1.22)
MONOCYTES NFR BLD AUTO: 9 % (ref 4–12)
NEUTROPHILS # BLD AUTO: 2.79 THOUSANDS/ÂΜL (ref 1.85–7.62)
NEUTS SEG NFR BLD AUTO: 73 % (ref 43–75)
NRBC BLD AUTO-RTO: 0 /100 WBCS
PLATELET # BLD AUTO: 167 THOUSANDS/UL (ref 149–390)
PMV BLD AUTO: 12 FL (ref 8.9–12.7)
POTASSIUM SERPL-SCNC: 3.8 MMOL/L (ref 3.5–5.3)
RBC # BLD AUTO: 3.2 MILLION/UL (ref 3.81–5.12)
SODIUM SERPL-SCNC: 141 MMOL/L (ref 135–147)
WBC # BLD AUTO: 3.83 THOUSAND/UL (ref 4.31–10.16)

## 2024-07-03 PROCEDURE — 85025 COMPLETE CBC W/AUTO DIFF WBC: CPT | Performed by: INTERNAL MEDICINE

## 2024-07-03 PROCEDURE — 82550 ASSAY OF CK (CPK): CPT | Performed by: INTERNAL MEDICINE

## 2024-07-03 PROCEDURE — 71045 X-RAY EXAM CHEST 1 VIEW: CPT

## 2024-07-03 PROCEDURE — 80048 BASIC METABOLIC PNL TOTAL CA: CPT | Performed by: INTERNAL MEDICINE

## 2024-07-03 PROCEDURE — 99232 SBSQ HOSP IP/OBS MODERATE 35: CPT

## 2024-07-03 PROCEDURE — NC001 PR NO CHARGE: Performed by: NURSE PRACTITIONER

## 2024-07-03 PROCEDURE — 36569 INSJ PICC 5 YR+ W/O IMAGING: CPT

## 2024-07-03 PROCEDURE — 99232 SBSQ HOSP IP/OBS MODERATE 35: CPT | Performed by: INTERNAL MEDICINE

## 2024-07-03 PROCEDURE — 71250 CT THORAX DX C-: CPT

## 2024-07-03 PROCEDURE — 99233 SBSQ HOSP IP/OBS HIGH 50: CPT | Performed by: INTERNAL MEDICINE

## 2024-07-03 PROCEDURE — C1751 CATH, INF, PER/CENT/MIDLINE: HCPCS

## 2024-07-03 RX ORDER — VANCOMYCIN HYDROCHLORIDE 750 MG/150ML
750 INJECTION, SOLUTION INTRAVENOUS EVERY 24 HOURS
Qty: 2850 ML | Refills: 0 | Status: SHIPPED | OUTPATIENT
Start: 2024-07-03 | End: 2024-07-03

## 2024-07-03 RX ORDER — VANCOMYCIN HYDROCHLORIDE 750 MG/150ML
750 INJECTION, SOLUTION INTRAVENOUS EVERY 24 HOURS
Qty: 2850 ML | Refills: 0 | Status: ON HOLD | OUTPATIENT
Start: 2024-07-03 | End: 2024-07-22

## 2024-07-03 RX ADMIN — APIXABAN 5 MG: 5 TABLET, FILM COATED ORAL at 09:40

## 2024-07-03 RX ADMIN — VANCOMYCIN HYDROCHLORIDE 750 MG: 750 INJECTION, SOLUTION INTRAVENOUS at 15:37

## 2024-07-03 RX ADMIN — FUROSEMIDE 20 MG: 20 TABLET ORAL at 09:40

## 2024-07-03 RX ADMIN — PANTOPRAZOLE SODIUM 40 MG: 40 TABLET, DELAYED RELEASE ORAL at 05:33

## 2024-07-03 RX ADMIN — HYDROXYCHLOROQUINE SULFATE 200 MG: 200 TABLET ORAL at 09:40

## 2024-07-03 RX ADMIN — PREDNISONE 5 MG: 5 TABLET ORAL at 09:40

## 2024-07-03 RX ADMIN — METOPROLOL TARTRATE 25 MG: 25 TABLET, FILM COATED ORAL at 09:40

## 2024-07-03 RX ADMIN — METOPROLOL TARTRATE 25 MG: 25 TABLET, FILM COATED ORAL at 22:12

## 2024-07-03 RX ADMIN — APIXABAN 5 MG: 5 TABLET, FILM COATED ORAL at 22:05

## 2024-07-03 RX ADMIN — MICONAZOLE NITRATE: 20 CREAM TOPICAL at 16:45

## 2024-07-03 RX ADMIN — ACETAMINOPHEN 650 MG: 325 TABLET, FILM COATED ORAL at 05:54

## 2024-07-03 RX ADMIN — AMMONIUM LACTATE: 12 CREAM TOPICAL at 16:46

## 2024-07-03 RX ADMIN — AMMONIUM LACTATE: 12 CREAM TOPICAL at 09:41

## 2024-07-03 RX ADMIN — PREDNISONE 5 MG: 5 TABLET ORAL at 16:42

## 2024-07-03 RX ADMIN — HYDROXYCHLOROQUINE SULFATE 200 MG: 200 TABLET ORAL at 16:42

## 2024-07-03 RX ADMIN — MICONAZOLE NITRATE: 20 CREAM TOPICAL at 09:41

## 2024-07-03 NOTE — ASSESSMENT & PLAN NOTE
Recent MRSA bacteremia with acute osteomyelitis of thoracic spine  Due to rising CPK, IV daptomycin was changed back to IV vancomycin through 7/22/2024  Monitor ESR and CRP weekly  Repeat blood cultures showed no growth at 48 hours  Patient received PICC line placement on 7/24.   ID on board appreciate recommendations.

## 2024-07-03 NOTE — PROGRESS NOTES
Progress Note - Cardiology   Bhavna JANUSZ Al 73 y.o. female MRN: [id number]  Unit/Bed#: E4 -01 Encounter: [id number]          Assessment / Plan  Acute hypoxic respiratory failure              - Initially briefly treated with BiPAP  HFpEF: Signs of acute decompensation POA in the setting of rapid AF and prehospital d/c of routine diuretic              - prehospital diuretic: Typically Lasix 40 mg daily--> on none immediately prior to admission d/t AMANDA              - Echo 6/8/2024: LV normal size & thickness, EF 65% with no RWMA (though technically limited study).   Persistent atrial fibrillation: RVR POA - improved              - Anticoagulation: Eliquis 5 mg twice daily              - AV blocking Rx PTA: Metoprolol 25 mg twice daily  Abnormal troponin - nonischemic myocardial injury              - HS Troponin 280, 323, 3 and 64 without angina or ischemic ECG change  Metabolic encephalopathy  CKD, AMANDA -resolved   - Baseline creatinine historically assessed at 1.3-1.6; 1.28 on arrival then peaking at 1.7 before returning to 1.2   MRSA bacteremia, thoracic spine osteomyelitis/discitis              - Hospitalized [facility] 6/6-18:   *2 blood cultures MRSA and coagulase-negative staph positive                          *MRI spine 6/9/2024: Findings suspicious for T9/10 discitis/osteomyelitis                          *Echo without signs of SBE                          *Repeat blood cultures 6/10/2024 negative                          *PICC line placed with planned IV vancomycin through 7/22/2024--> discharged to rehab facility--> subsequent leukopenia and AAMNDA prompting change to daptomycin              - Current admission without leukocytosis or fever with negative procalcitonin              - Blood cultures x2, 6/29/2024 both without growth at 72 hours (updated 7/2/2024)  Rheumatoid arthritis, chronic immunosuppressive therapy (Plaquenil + prednisone)  Secondary adrenal insufficiency  Left buttock pressure ulcer  POA  Immobile    Sodium 141, potassium 3.8, BUN 13, creatinine 1.21  WBC 3.8, hemoglobin 8.8, platelets 167    Maintaining O2 saturations above 98%  As noted yesterday, several days off of diuretic renal function had normalized with improvement of chest x-ray albeit right upper lobe density with possible worsening but without other sign of infection  Now back on prehospital dose of oral oral diuretic  Chest CT pending for better assessment of right upper lobe density -- some historical features concerning for possible aspiration  Atrial fibrillation remains controlled on prehospital dose of beta-blocker, Eliquis ongoing for reduction of stroke risk ~~> continue same  Treatment of recent diagnosis of bacteremia and spinal osteomyelitis per primary service and infectious disease as delineated in their notes       Subjective:  Feeling generally well. No new complaint. Reports some cough - seems mosthly after meals but without gross dysphagia    Vitals:  Vitals:    07/01/24 0600 07/03/24 0554   Weight: 102 kg (223 lb 15.8 oz) 101 kg (221 lb 12.5 oz)   ,  Vitals:    07/02/24 2300 07/03/24 0200 07/03/24 0554 07/03/24 0750   BP: 124/63 119/61  129/62   BP Location:    Right arm   Pulse: 84 74  86   Resp: 20 20  20   Temp: (!) 97.2 °F (36.2 °C) (!) 97.1 °F (36.2 °C)  97.8 °F (36.6 °C)   TempSrc: Temporal Temporal  Temporal   SpO2: 98% 97%  100%   Weight:   101 kg (221 lb 12.5 oz)    Height:           Exam:  General: Sitting upright in bed.  Normally conversant and comfortable.  Heart: Irregular with controlled rate.  Distant heart tones but no gross murmur   lungs: Reduced excursion and air exchange.  Presently clear  Lower Limbs: Trace dependent edema    Medications:    Current Facility-Administered Medications:     acetaminophen (TYLENOL) tablet 650 mg, 650 mg, Oral, Q4H PRN, Shania Velarde MD, 650 mg at 07/03/24 0554    albuterol (PROVENTIL HFA,VENTOLIN HFA) inhaler 2 puff, 2 puff, Inhalation, Q6H PRN, Shania  MD Prachi    ammonium lactate (LAC-HYDRIN) 12 % cream, , Topical, BID, Genie Abbasi MD, Given at 07/03/24 0941    apixaban (ELIQUIS) tablet 5 mg, 5 mg, Oral, BID, Shania Velarde MD, 5 mg at 07/03/24 0940    furosemide (LASIX) tablet 20 mg, 20 mg, Oral, Daily, Kostas Bruce DO, 20 mg at 07/03/24 0940    hydroxychloroquine (PLAQUENIL) tablet 200 mg, 200 mg, Oral, BID, Shania Velarde MD, 200 mg at 07/03/24 0940    metoprolol tartrate (LOPRESSOR) tablet 25 mg, 25 mg, Oral, Q12H FAMILIA, Kostas Bruce DO, 25 mg at 07/03/24 0940    moisture barrier miconazole 2% cream (aka MAGALY MOISTURE BARRIER ANTIFUNGAL CREAM), , Topical, BID, Genie Abbasi MD, Given at 07/03/24 0941    pantoprazole (PROTONIX) EC tablet 40 mg, 40 mg, Oral, Early Morning, Shania Velarde MD, 40 mg at 07/03/24 0533    predniSONE tablet 5 mg, 5 mg, Oral, BID With Meals, Shania Velarde MD, 5 mg at 07/03/24 0940    vancomycin (VANCOCIN) IVPB (premix in dextrose) 750 mg 150 mL, 750 mg, Intravenous, Q24H, AZIZA Barillas, Last Rate: 150 mL/hr at 07/02/24 1358, 750 mg at 07/02/24 1358      Labs/Data:        Results from last 7 days   Lab Units 07/03/24 0427 07/02/24  0529 07/01/24  0528   WBC Thousand/uL 3.83* 3.76* 4.56   HEMOGLOBIN g/dL 8.8* 8.7* 8.8*   HEMATOCRIT % 28.4* 27.4* 27.8*   PLATELETS Thousands/uL 167 157 148*     Results from last 7 days   Lab Units 07/03/24  0427 07/02/24  0529 07/01/24  1636 07/01/24  0528   POTASSIUM mmol/L 3.8 3.6  --  3.5   CHLORIDE mmol/L 108 109*  --  108   CO2 mmol/L 26 28  --  28   BUN mg/dL 13 11  --  13   CREATININE mg/dL 1.21 1.16  --  1.47*   CK TOTAL U/L 149 385* 554* 539*

## 2024-07-03 NOTE — PROGRESS NOTES
Progress Note - Infectious Disease   Bhavna Al 73 y.o. female MRN: [id number]  Unit/Bed#: E4 -01 Encounter: [id number]      Impression/Plan:  1. MRSA bacteremia and thoracic spine osteomyelitis, under active treatment.  Diagnosed on last admission.  Patient had initially been on vancomycin but developed issues with leukopenia and possible renal function issue, which led to transition to daptomycin. She is now back inpatient from facility due to acute hypoxic respiratory failure, with additional AMANDA. PICC was removed. Admission blood cultures are negative >48 hours. She continued Daptomycin but developed increasing CK. She has been transitioned back to IV vancomycin and appears to be tolerating this antibiotic without difficulty.  Repeat blood cultures are negative  -continue IV vancomycin  -continue pharmacy consult for guidance on vancomycin dosage  -monitor CBCD, creatinine, and vancomycin trough  -follow up admission blood cultures  -monitor vitals  -patient will need new PICC placed as she will need to continue her plan for 6 weeks of IV antibiotic treatment through 7/22/2024  -Will contact the ID office to arrange follow-up labs and office follow-up.     2. Acute hypoxic respiratory failure.  Suspect that this is related to patient's underlying volume status as she had recently been off diuretics.  Respiratory status is improving again today. This morning her O2 saturation is stable on room air.  -volume management per SLIM  -monitor vitals  -monitor respiratory status     3. Acute metabolic encephalopathy.  Patient very alert and interactive during my visit but is still somewhat confused about having an active infection and needing ongoing treatment.   -antibiotic as above  -recommend aspiration precautions   -serial neuro exams  -monitor mood and mental status  -supportive care     4. Acute kidney injury. On chronic kidney disease.  This impacts antibiotic dosing. Upon review of her available medical  records it appears her baseline creatinine is approximately 1.1-1.2. Creatinine appears to have peaked at 1.7. This is likely due to volume issues. Also consider role of recent antibiotic use. Her creatinine is trending down this morning, was 1.16 today.   -monitor creatinine  -dose adjust antibiotic for renal function as needed  -avoid nephrotoxins  -volume management per primary service     5.  Rheumatoid arthritis on immunosuppressive therapy.  Patient chronically on Plaquenil and prednisone. This is risk factor for infection above  -recommend ongoing follow up with rheumatology after discharge     6.  Leukopenia.  Mild and at this point appears to be clinically inconsequential with a neutrophil count well over 1000.  -Recheck CBC with differential    Discussed with the primary service the plan to continue the IV vancomycin and they agree with the plan    Once outpatient intravenous antibiotics arranged okay to proceed with discharge from infectious disease standpoint.    Antibiotics:  Vancomycin 3  Antibiotics 26    Subjective:  Patient has no fever, chills, sweats; no nausea, vomiting, diarrhea; no cough, shortness of breath; no pain. No new symptoms.  She is in good spirits    Objective:  Vitals:  Temp:  [97.1 °F (36.2 °C)-97.8 °F (36.6 °C)] 97.8 °F (36.6 °C)  HR:  [74-86] 86  Resp:  [18-20] 20  BP: (108-129)/(56-71) 129/62  SpO2:  [97 %-100 %] 100 %  Temp (24hrs), Av.5 °F (36.4 °C), Min:97.1 °F (36.2 °C), Max:97.8 °F (36.6 °C)  Current: Temperature: 97.8 °F (36.6 °C)    Physical Exam:   General Appearance:  Alert, interactive, nontoxic, no acute distress.   Throat: Oropharynx moist without lesions.    Lungs:   Clear to auscultation bilaterally; no wheezes, rhonchi or rales; respirations unlabored   Heart:  RRR; no murmur, rub or gallop   Abdomen:   Soft, non-tender, non-distended, positive bowel sounds.     Extremities: No clubbing, cyanosis or edema   Skin: Scant scaling but otherwise improved.        Labs, Imaging, & Other studies:   All pertinent labs and imaging studies were personally reviewed  Results from last 7 days   Lab Units 07/03/24 0427 07/02/24  0529 07/01/24  0528   WBC Thousand/uL 3.83* 3.76* 4.56   HEMOGLOBIN g/dL 8.8* 8.7* 8.8*   PLATELETS Thousands/uL 167 157 148*     Results from last 7 days   Lab Units 07/03/24  0427 07/02/24  0529 07/01/24  0528 06/30/24  0547 06/29/24  1450   SODIUM mmol/L 141 142 142   < > 140   POTASSIUM mmol/L 3.8 3.6 3.5   < > 4.9   CHLORIDE mmol/L 108 109* 108   < > 106   CO2 mmol/L 26 28 28   < > 22   BUN mg/dL 13 11 13   < > 10   CREATININE mg/dL 1.21 1.16 1.47*   < > 1.28   EGFR ml/min/1.73sq m 43 46 34   < > 40   CALCIUM mg/dL 8.4 8.0* 8.5   < > 8.9   AST U/L  --   --  48*  --  60*   ALT U/L  --   --  28  --  31   ALK PHOS U/L  --   --  39  --  60    < > = values in this interval not displayed.     Results from last 7 days   Lab Units 06/29/24  1450 06/29/24  1449   BLOOD CULTURE  No Growth at 72 hrs. No Growth at 72 hrs.     Results from last 7 days   Lab Units 06/29/24  1450   PROCALCITONIN ng/ml 0.23

## 2024-07-03 NOTE — PROGRESS NOTES
Bhavna Al is a 73 y.o. female who is currently ordered Vancomycin IV with management by the Pharmacy Consult service.  Relevant clinical data and objective / subjective history reviewed.  Vancomycin Assessment:  Indication and Goal AUC/Trough: Bacteremia (goal -600, trough >10), -600, trough >10  Clinical Status: stable  Micro: No Growth at 72 hrs.     Renal Function:  SCr: 0.85 mg/dL  CrCl: 43.8 mL/min  Renal replacement: Not on dialysis  Days of Therapy: 3  Current Dose: 1750mg IV once  Vancomycin Plan:  New Dosinmg IV q24h  Estimated AUC: 415 mcg*hr/mL  Estimated Trough: 12.4 mcg/mL  Next Level:  with am labs  Renal Function Monitoring: Daily BMP and UOP  Pharmacy will continue to follow closely for s/sx of nephrotoxicity, infusion reactions and appropriateness of therapy.  BMP and CBC will be ordered per protocol. We will continue to follow the patient’s culture results and clinical progress daily.    Wil Virk, Pharmacist

## 2024-07-03 NOTE — DISCHARGE INSTR - AVS FIRST PAGE
PICC TO BE REMOVED BY FACILITY RN AFTER FINAL DOSE OF IV ANTIBIOTIC ON 7/22/2024.  Weekly CBCD, creatinine, and vancomycin trough while on IV antibiotic.  You will be set up with an appointment in the outpatient Infectious Disease office. It is important for you to keep this appointment in order for us to monitor you while you are on IV antibiotics.  This will include evaluating your lab work, examining your PICC line, and making sure you are not having toxicities or side effects of the medication(s) you are receiving.   ------------------------------------------------------------------------------------------------------------      Discharge instructions from hospitalist  1. Follow-up with your primary care physician in 1 week in regards to recent hospitalization.    New, more focal area of groundglass opacity/consolidation measuring 2.0 x 0.6 x 1.0 cm in the right upper lobe, representing either sequelae of volume overload or focal infectious/inflammatory process. Recommend follow-up with repeat chest CT in 3 to 4 months to ensure stability/resolution.     2. Take medications regularly    3. Come back to the ER if symptoms recur or worsen  4. Activity as tolerated  5. Diet :  Heart healthy diet

## 2024-07-03 NOTE — PLAN OF CARE
Problem: INFECTION - ADULT  Goal: Absence or prevention of progression during hospitalization  Description: INTERVENTIONS:  Picc procedure and  maintenance  - Assess and monitor for signs and symptoms of infection  - Monitor lab/diagnostic results  - Monitor all insertion sites, i.e. indwelling lines, tubes, and drains  - Monitor endotracheal if appropriate and nasal secretions for changes in amount and color  - Muddy appropriate cooling/warming therapies per order  - Administer medications as ordered  - Instruct and encourage patient and family to use good hand hygiene technique  - Identify and instruct in appropriate isolation precautions for identified infection/condition  Outcome: Progressing

## 2024-07-03 NOTE — PLAN OF CARE
Problem: Potential for Falls  Goal: Patient will remain free of falls  Description: INTERVENTIONS:  - Educate patient/family on patient safety including physical limitations  - Instruct patient to call for assistance with activity   - Consult OT/PT to assist with strengthening/mobility   - Keep Call bell within reach  - Keep bed low and locked with side rails adjusted as appropriate  - Keep care items and personal belongings within reach  - Initiate and maintain comfort rounds  - Make Fall Risk Sign visible to staff  - Offer Toileting every 2 Hours, in advance of need  - Initiate/Maintain bed alarm  - Obtain necessary fall risk management equipment  - Apply yellow socks and bracelet for high fall risk patients  - Consider moving patient to room near nurses station  Outcome: Progressing     Problem: Prexisting or High Potential for Compromised Skin Integrity  Goal: Skin integrity is maintained or improved  Description: INTERVENTIONS:  - Identify patients at risk for skin breakdown  - Assess and monitor skin integrity  - Assess and monitor nutrition and hydration status  - Monitor labs   - Assess for incontinence   - Turn and reposition patient  - Assist with mobility/ambulation  - Relieve pressure over bony prominences  - Avoid friction and shearing  - Provide appropriate hygiene as needed including keeping skin clean and dry  - Evaluate need for skin moisturizer/barrier cream  - Collaborate with interdisciplinary team   - Patient/family teaching  - Consider wound care consult   Outcome: Progressing     Problem: INFECTION - ADULT  Goal: Absence or prevention of progression during hospitalization  Description: INTERVENTIONS:  - Assess and monitor for signs and symptoms of infection  - Monitor lab/diagnostic results  - Monitor all insertion sites, i.e. indwelling lines, tubes, and drains  - Monitor endotracheal if appropriate and nasal secretions for changes in amount and color  - [place] appropriate  cooling/warming therapies per order  - Administer medications as ordered  - Instruct and encourage patient and family to use good hand hygiene technique  - Identify and instruct in appropriate isolation precautions for identified infection/condition  Outcome: Progressing  Goal: Absence of fever/infection during neutropenic period  Description: INTERVENTIONS:  - Monitor WBC    Outcome: Progressing     Problem: SAFETY ADULT  Goal: Patient will remain free of falls  Description: INTERVENTIONS:  - Educate patient/family on patient safety including physical limitations  - Instruct patient to call for assistance with activity   - Consult OT/PT to assist with strengthening/mobility   - Keep Call bell within reach  - Keep bed low and locked with side rails adjusted as appropriate  - Keep care items and personal belongings within reach  - Initiate and maintain comfort rounds  - Make Fall Risk Sign visible to staff  - Offer Toileting every 2 Hours, in advance of need  - Initiate/Maintain bed alarm  - Obtain necessary fall risk management equipment  - Apply yellow socks and bracelet for high fall risk patients  - Consider moving patient to room near nurses station  Outcome: Progressing  Goal: Maintain or return to baseline ADL function  Description: INTERVENTIONS:  -  Assess patient's ability to carry out ADLs; assess patient's baseline for ADL function and identify physical deficits which impact ability to perform ADLs (bathing, care of mouth/teeth, toileting, grooming, dressing, etc.)  - Assess/evaluate cause of self-care deficits   - Assess range of motion  - Assess patient's mobility; develop plan if impaired  - Assess patient's need for assistive devices and provide as appropriate  - Encourage maximum independence but intervene and supervise when necessary  - Involve family in performance of ADLs  - Assess for home care needs following discharge   - Consider OT consult to assist with ADL evaluation and planning for  discharge  - Provide patient education as appropriate  Outcome: Progressing  Goal: Maintains/Returns to pre admission functional level  Description: INTERVENTIONS:  - Perform AM-PAC 6 Click Basic Mobility/ Daily Activity assessment daily.  - Set and communicate daily mobility goal to care team and patient/family/caregiver.   - Collaborate with rehabilitation services on mobility goals if consulted  - Perform Range of Motion 3 times a day.  - Reposition patient every 2 hours.  - Dangle patient 3 times a day  - Stand patient 3 times a day  - Ambulate patient 3 times a day  - Out of bed to chair 3 times a day   - Out of bed for meals 3 times a day  - Out of bed for toileting  - Record patient progress and toleration of activity level   Outcome: Progressing     Problem: DISCHARGE PLANNING  Goal: Discharge to home or other facility with appropriate resources  Description: INTERVENTIONS:  - Identify barriers to discharge w/patient and caregiver  - Arrange for needed discharge resources and transportation as appropriate  - Identify discharge learning needs (meds, wound care, etc.)  - Arrange for interpretive services to assist at discharge as needed  - Refer to Case Management Department for coordinating discharge planning if the patient needs post-hospital services based on physician/advanced practitioner order or complex needs related to functional status, cognitive ability, or social support system  Outcome: Progressing     Problem: Knowledge Deficit  Goal: Patient/family/caregiver demonstrates understanding of disease process, treatment plan, medications, and discharge instructions  Description: Complete learning assessment and assess knowledge base.  Interventions:  - Provide teaching at level of understanding  - Provide teaching via preferred learning methods  Outcome: Progressing     Problem: CARDIOVASCULAR - ADULT  Goal: Maintains optimal cardiac output and hemodynamic stability  Description: INTERVENTIONS:  -  Monitor I/O, vital signs and rhythm  - Monitor for S/S and trends of decreased cardiac output  - Administer and titrate ordered vasoactive medications to optimize hemodynamic stability  - Assess quality of pulses, skin color and temperature  - Assess for signs of decreased coronary artery perfusion  - Instruct patient to report change in severity of symptoms  Outcome: Progressing     Problem: RESPIRATORY - ADULT  Goal: Achieves optimal ventilation and oxygenation  Description: INTERVENTIONS:  - Assess for changes in respiratory status  - Assess for changes in mentation and behavior  - Position to facilitate oxygenation and minimize respiratory effort  - Oxygen administered by appropriate delivery if ordered  - Initiate smoking cessation education as indicated  - Encourage broncho-pulmonary hygiene including cough, deep breathe, Incentive Spirometry  - Assess the need for suctioning and aspirate as needed  - Assess and instruct to report SOB or any respiratory difficulty  - Respiratory Therapy support as indicated  Outcome: Progressing     Problem: SKIN/TISSUE INTEGRITY - ADULT  Goal: Skin Integrity remains intact(Skin Breakdown Prevention)  Description: Assess:  -Perform [place] assessment every shift   -Clean and moisturize skin every day, or as needed   -Inspect skin when repositioning, toileting, and assisting with ADLS  -Assess extremities for adequate circulation and sensation     Bed Management:  -Have minimal linens on bed & keep smooth, unwrinkled  -Change linens as needed when moist or perspiring  -Avoid sitting or lying in one position for more than 2 hours while in bed  -Keep HOB at 300degrees     Toileting:  -Offer bedside commode  -Assess for incontinence every 2 hours  -Use incontinent care products after each incontinent episode such as protective barrier cream    Activity:  -Mobilize patient 3 times a day  -Encourage activity and walks on unit  -Encourage or provide ROM exercises   -Turn and  reposition patient every 2 Hours  -Use appropriate equipment to lift or move patient in bed  -Instruct/ Assist with weight shifting every 2 hours when out of bed in chair  -Consider limitation of chair time 2 hour intervals    Skin Care:  -Avoid use of baby powder, tape, friction and shearing, hot water or constrictive clothing  -Relieve pressure over bony prominences using wedges, pillows, allevyns, waffle cushions  -Do not massage red bony areas    Next Steps:  -Teach patient strategies to minimize risks such as immobility, moisture    -Consider consults to  interdisciplinary teams such as physical therapy, occupational therapy  Outcome: Progressing  Goal: Pressure injury heals and does not worsen  Description: Interventions:  - Implement low air loss mattress or specialty surface (Criteria met)  - Apply silicone foam dressing  - Instruct/assist with weight shifting every 30 minutes when in chair   - Limit chair time to 2 hour intervals  - Use special pressure reducing interventions such as waffle cushion when in chair   - Apply fecal or urinary incontinence containment device   - Perform passive or active ROM every 3 hours   - Turn and reposition patient & offload bony prominences every 2 hours   - Utilize friction reducing device or surface for transfers   - Consider consults to  interdisciplinary teams such as physical therapy and occupational therapy  - Use incontinent care products after each incontinent episode such as protective moisture barrier  - Consider nutrition services referral as needed  Outcome: Progressing     Problem: Nutrition/Hydration-ADULT  Goal: Nutrient/Hydration intake appropriate for improving, restoring or maintaining nutritional needs  Description: Monitor and assess patient's nutrition/hydration status for malnutrition. Collaborate with interdisciplinary team and initiate plan and interventions as ordered.  Monitor patient's weight and dietary intake as ordered or per policy. Utilize  nutrition screening tool and intervene as necessary. Determine patient's food preferences and provide high-protein, high-caloric foods as appropriate.     INTERVENTIONS:  - Monitor oral intake, urinary output, labs, and treatment plans  - Assess nutrition and hydration status and recommend course of action  - Evaluate amount of meals eaten  - Assist patient with eating if necessary   - Allow adequate time for meals  - Recommend/ encourage appropriate diets, oral nutritional supplements, and vitamin/mineral supplements  - Order, calculate, and assess calorie counts as needed  - Recommend, monitor, and adjust tube feedings and TPN/PPN based on assessed needs  - Assess need for intravenous fluids  - Provide specific nutrition/hydration education as appropriate  - Include patient/family/caregiver in decisions related to nutrition  Outcome: Progressing

## 2024-07-03 NOTE — QUICK NOTE
Received a request for PICC clearance for antibiotic use through 7/22 after which line will be removed.  Peak creatinine 1.7 (baseline previously assessed as 1.3-1.6.  There is not a great deal of data at steady state).  Renal function has improved the last 2 days creatinine near 1.2.  Upon review of other data such as imaging: Kidneys were noted to be unremarkable.  No history of significant proteinuria.  Cleared for PICC placement.

## 2024-07-03 NOTE — PROCEDURES
Insert Complex Venous Access Line    Date/Time: 7/3/2024 11:28 AM    Performed by: iLza Alcantara RN  Authorized by: Genie Abbasi MD    Patient location:  Bedside  Consent:     Consent obtained:  Written    Consent given by:  Patient    Procedural risks discussed: consent obtained by Dr Abbasi.  Universal protocol:     Procedure explained and questions answered to patient or proxy's satisfaction: yes      Immediately prior to procedure, a time out was called: yes      Relevant documents present and verified: yes      Test results available and properly labeled: yes      Radiology Images displayed and confirmed.  If images not available, report reviewed: yes      Required blood products, implants, devices, and special equipment available: yes      Site/side marked: yes      Patient identity confirmed:  Verbally with patient, arm band, provided demographic data and hospital-assigned identification number  Pre-procedure details:     Hand hygiene: Hand hygiene performed prior to insertion      Sterile barrier technique: All elements of maximal sterile technique followed      Skin preparation:  ChloraPrep  Procedure details:     Complex Venous Access Line Type: PICC      Complex Venous Access Line Indications: long term antibiotics      Catheter tip vessel location: subclavian vein      Orientation:  Left    Location:  [place]    Procedural supplies:  Single lumen    Catheter size:  4 Fr    Total catheter length (cm):  38    Catheter out on skin (cm):  0    Max flow rate:  999ml/hr    Arm circumference:  49cm    Patient evaluated for contraindications to access (i.e. fistula, thrombosis, etc): Yes      Approach: percutaneous technique used      Patient position:  Flat    Ultrasound image availability:  Not saved    Sterile ultrasound techniques: Sterile gel and sterile probe covers were used      Number of attempts:  1    Successful placement: yes      Landmarks identified: yes      Vessel of catheter tip end:  Chest  Xray needed to confirm placement  Anesthesia (see MAR for exact dosages):     Anesthesia method:  Local infiltration (3ml)    Local anesthetic:  Lidocaine 1% w/o epi  Post-procedure details:     Post-procedure:  Dressing applied and securement device placed    Assessment:  Blood return through all ports, free fluid flow and placement verified by x-ray (cxr confirmed picc terminates SVC, picc okay to be utlized)    Post-procedure complications: none      Patient tolerance of procedure:  Tolerated well, no immediate complications    ID cleared pt for picc insertion  Nephrology cleared pt for picc insertion  Lot#[id number] 2025-09-30

## 2024-07-03 NOTE — PROGRESS NOTES
Vancomycin Discharge Planning     Bhavna JANUSZ Al is a good fit with the [employer] Vancomycin Bayesian model.  A surrogate trough range at q24h dosing that correlates with an AUC range of 400-600 for this patient is 11.9-17.9.     For questions regarding this range, or for recommendations for a surrogate trough range at an alternative dosing frequency, please contact the [facility] pharmacy team via Followap.     Trang Crump, PharmD, [facility]  Infectious Diseases Clinical Pharmacy Specialist

## 2024-07-03 NOTE — PLAN OF CARE
Problem: Potential for Falls  Goal: Patient will remain free of falls  Description: INTERVENTIONS:  - Educate patient/family on patient safety including physical limitations  - Instruct patient to call for assistance with activity   - Consult OT/PT to assist with strengthening/mobility   - Keep Call bell within reach  - Keep bed low and locked with side rails adjusted as appropriate  - Keep care items and personal belongings within reach  - Initiate and maintain comfort rounds  - Make Fall Risk Sign visible to staff  - Offer Toileting every 2 Hours, in advance of need  - Initiate/Maintain alarm  - Obtain necessary fall risk management equipment:   - Apply yellow socks and bracelet for high fall risk patients  - Consider moving patient to room near nurses station  7/3/2024 1234 by Trang Heck RN  Outcome: Progressing  7/3/2024 1234 by Trang Heck RN  Outcome: Progressing     Problem: Prexisting or High Potential for Compromised Skin Integrity  Goal: Skin integrity is maintained or improved  Description: INTERVENTIONS:  - Identify patients at risk for skin breakdown  - Assess and monitor skin integrity  - Assess and monitor nutrition and hydration status  - Monitor labs   - Assess for incontinence   - Turn and reposition patient  - Assist with mobility/ambulation  - Relieve pressure over bony prominences  - Avoid friction and shearing  - Provide appropriate hygiene as needed including keeping skin clean and dry  - Evaluate need for skin moisturizer/barrier cream  - Collaborate with interdisciplinary team   - Patient/family teaching  - Consider wound care consult   7/3/2024 1234 by Trang Heck RN  Outcome: Progressing  7/3/2024 1234 by Trang Heck RN  Outcome: Progressing     Problem: INFECTION - ADULT  Goal: Absence or prevention of progression during hospitalization  Description: INTERVENTIONS:  - Assess and monitor for signs and symptoms of infection  - Monitor lab/diagnostic  results  - Monitor all insertion sites, i.e. indwelling lines, tubes, and drains  - Monitor endotracheal if appropriate and nasal secretions for changes in amount and color  - Bath appropriate cooling/warming therapies per order  - Administer medications as ordered  - Instruct and encourage patient and family to use good hand hygiene technique  - Identify and instruct in appropriate isolation precautions for identified infection/condition  7/3/2024 1234 by Trang Heck RN  Outcome: Progressing  7/3/2024 1234 by Trang Heck RN  Outcome: Progressing  Goal: Absence of fever/infection during neutropenic period  Description: INTERVENTIONS:  - Monitor WBC    7/3/2024 1234 by Trang Heck RN  Outcome: Progressing  7/3/2024 1234 by Trang Heck RN  Outcome: Progressing     Problem: SAFETY ADULT  Goal: Patient will remain free of falls  Description: INTERVENTIONS:  - Educate patient/family on patient safety including physical limitations  - Instruct patient to call for assistance with activity   - Consult OT/PT to assist with strengthening/mobility   - Keep Call bell within reach  - Keep bed low and locked with side rails adjusted as appropriate  - Keep care items and personal belongings within reach  - Initiate and maintain comfort rounds  - Make Fall Risk Sign visible to staff  - Offer Toileting every  Hours, in advance of need  - Initiate/Maintaialarm  - Obtain necessary fall risk management equipment:   - Apply yellow socks and bracelet for high fall risk patients  - Consider moving patient to room near nurses station  7/3/2024 1234 by Trang Heck RN  Outcome: Progressing  7/3/2024 1234 by Trang Heck RN  Outcome: Progressing  Goal: Maintain or return to baseline ADL function  Description: INTERVENTIONS:  -  Assess patient's ability to carry out ADLs; assess patient's baseline for ADL function and identify physical deficits which impact ability to perform ADLs (bathing,  care of mouth/teeth, toileting, grooming, dressing, etc.)  - Assess/evaluate cause of self-care deficits   - Assess range of motion  - Assess patient's mobility; develop plan if impaired  - Assess patient's need for assistive devices and provide as appropriate  - Encourage maximum independence but intervene and supervise when necessary  - Involve family in performance of ADLs  - Assess for home care needs following discharge   - Consider OT consult to assist with ADL evaluation and planning for discharge  - Provide patient education as appropriate  7/3/2024 1234 by Trang Heck RN  Outcome: Progressing  7/3/2024 1234 by Trang Heck RN  Outcome: Progressing  Goal: Maintains/Returns to pre admission functional level  Description: INTERVENTIONS:  - Perform AM-PAC 6 Click Basic Mobility/ Daily Activity assessment daily.  - Set and communicate daily mobility goal to care team and patient/family/caregiver.   - Collaborate with rehabilitation services on mobility goals if consulted  - Perform Range of Motion  times a day.  - Reposition patient every  hours.  - Dangle patient  times a day  - Stand patient  times a day  - Ambulate patient  times a day  - Out of bed to chair  times a day   - Out of bed for meals  times a day  - Out of bed for toileting  - Record patient progress and toleration of activity level   7/3/2024 1234 by Trang Heck RN  Outcome: Progressing  7/3/2024 1234 by Trang Heck RN  Outcome: Progressing     Problem: DISCHARGE PLANNING  Goal: Discharge to home or other facility with appropriate resources  Description: INTERVENTIONS:  - Identify barriers to discharge w/patient and caregiver  - Arrange for needed discharge resources and transportation as appropriate  - Identify discharge learning needs (meds, wound care, etc.)  - Arrange for interpretive services to assist at discharge as needed  - Refer to Case Management Department for coordinating discharge planning if the  patient needs post-hospital services based on physician/advanced practitioner order or complex needs related to functional status, cognitive ability, or social support system  7/3/2024 1234 by Trang Heck RN  Outcome: Progressing  7/3/2024 1234 by Trang Heck RN  Outcome: Progressing     Problem: Knowledge Deficit  Goal: Patient/family/caregiver demonstrates understanding of disease process, treatment plan, medications, and discharge instructions  Description: Complete learning assessment and assess knowledge base.  Interventions:  - Provide teaching at level of understanding  - Provide teaching via preferred learning methods  7/3/2024 1234 by Trang Heck RN  Outcome: Progressing  7/3/2024 1234 by Trang Heck RN  Outcome: Progressing     Problem: CARDIOVASCULAR - ADULT  Goal: Maintains optimal cardiac output and hemodynamic stability  Description: INTERVENTIONS:  - Monitor I/O, vital signs and rhythm  - Monitor for S/S and trends of decreased cardiac output  - Administer and titrate ordered vasoactive medications to optimize hemodynamic stability  - Assess quality of pulses, skin color and temperature  - Assess for signs of decreased coronary artery perfusion  - Instruct patient to report change in severity of symptoms  7/3/2024 1234 by Trang Heck RN  Outcome: Progressing  7/3/2024 1234 by Trang Heck RN  Outcome: Progressing     Problem: RESPIRATORY - ADULT  Goal: Achieves optimal ventilation and oxygenation  Description: INTERVENTIONS:  - Assess for changes in respiratory status  - Assess for changes in mentation and behavior  - Position to facilitate oxygenation and minimize respiratory effort  - Oxygen administered by appropriate delivery if ordered  - Initiate smoking cessation education as indicated  - Encourage broncho-pulmonary hygiene including cough, deep breathe, Incentive Spirometry  - Assess the need for suctioning and aspirate as needed  - Assess and  instruct to report SOB or any respiratory difficulty  - Respiratory Therapy support as indicated  7/3/2024 1234 by Trang Heck RN  Outcome: Progressing  7/3/2024 1234 by Trang Heck RN  Outcome: Progressing     Problem: SKIN/TISSUE INTEGRITY - ADULT  Goal: Skin Integrity remains intact(Skin Breakdown Prevention)  Description: Assess:  -Perform Gagan assessment every   -Clean and moisturize skin every   -Inspect skin when repositioning, toileting, and assisting with ADLS  -Assess under medical devices such as  every   -Assess extremities for adequate circulation and sensation     Bed Management:  -Have minimal linens on bed & keep smooth, unwrinkled  -Change linens as needed when moist or perspiring  -Avoid sitting or lying in one position for more than  hours while in bed  -Keep HOB at degrees     Toileting:  -Offer bedside commode  -Assess for incontinence every   -Use incontinent care products after each incontinent episode such as     Activity:  -Mobilize patient  times a day  -Encourage activity and walks on unit  -Encourage or provide ROM exercises   -Turn and reposition patient every  Hours  -Use appropriate equipment to lift or move patient in bed  -Instruct/ Assist with weight shifting every  when out of bed in chair  -Consider limitation of chair time  hour intervals    Skin Care:  -Avoid use of baby powder, tape, friction and shearing, hot water or constrictive clothing  -Relieve pressure over bony prominences using   -Do not massage red bony areas    Next Steps:  -Teach patient strategies to minimize risks such as    -Consider consults to  interdisciplinary teams such as   7/3/2024 1234 by Trang Heck RN  Outcome: Progressing  7/3/2024 1234 by Trang Heck RN  Outcome: Progressing  Goal: Pressure injury heals and does not worsen  Description: Interventions:  - Implement low air loss mattress or specialty surface (Criteria met)  - Apply silicone foam dressing  -  Instruct/assist with weight shifting every  minutes when in chair   - Limit chair time to  hour intervals  - Use special pressure reducing interventions such as  when in chair   - Apply fecal or urinary incontinence containment device   - Perform passive or active ROM every   - Turn and reposition patient & offload bony prominences every  hours   - Utilize friction reducing device or surface for transfers   - Consider consults to  interdisciplinary teams such as   - Use incontinent care products after each incontinent episode such as   - Consider nutrition services referral as needed  7/3/2024 1234 by Trang Heck RN  Outcome: Progressing  7/3/2024 1234 by Trang Heck RN  Outcome: Progressing     Problem: Nutrition/Hydration-ADULT  Goal: Nutrient/Hydration intake appropriate for improving, restoring or maintaining nutritional needs  Description: Monitor and assess patient's nutrition/hydration status for malnutrition. Collaborate with interdisciplinary team and initiate plan and interventions as ordered.  Monitor patient's weight and dietary intake as ordered or per policy. Utilize nutrition screening tool and intervene as necessary. Determine patient's food preferences and provide high-protein, high-caloric foods as appropriate.     INTERVENTIONS:  - Monitor oral intake, urinary output, labs, and treatment plans  - Assess nutrition and hydration status and recommend course of action  - Evaluate amount of meals eaten  - Assist patient with eating if necessary   - Allow adequate time for meals  - Recommend/ encourage appropriate diets, oral nutritional supplements, and vitamin/mineral supplements  - Order, calculate, and assess calorie counts as needed  - Recommend, monitor, and adjust tube feedings and TPN/PPN based on assessed needs  - Assess need for intravenous fluids  - Provide specific nutrition/hydration education as appropriate  - Include patient/family/caregiver in decisions related to  nutrition  7/3/2024 1234 by Trang Heck RN  Outcome: Progressing  7/3/2024 1234 by Trang Heck RN  Outcome: Progressing

## 2024-07-03 NOTE — QUICK NOTE
Performed chart review. This morning the patient is afebrile with stable mild leukopenia. She is on IV vancomycin currently dosed at 750mg daily. She will need her central line removed and a PICC placed as patient will transition back to rehab to continue her 6-week IV antibiotic treatment course, through 7/22/2024. Patient will need weekly CBCD, creatinine, and vancomycin trough while on antibiotic. Her goal trough range will be 11-9-17.9. Her PICC will need to be removed by facility RN after final dose of IV antibiotic on 7/22/2024. The patient is scheduled to follow up in the outpatient infectious disease office after discharge, appointment information is placed in the discharge planning.

## 2024-07-03 NOTE — ASSESSMENT & PLAN NOTE
Patient was admitted to [facility] 6/6/2024 to 6/18/2024 for MRSA bacteremia discharge to a rehab facility, presents back to the hospital with respiratory distress requiring BiPAP initially, found to be in A-fib with RVR which possible triggered CHF exacerbation  Initially on presentation heart rate in 150s, received a dose of Cardizem bolus currently heart rate in 100s  She is currently on metoprolol and has been controlled.  Cardiology is on board appreciate recommendations.  Patient received IV digoxin over the weekend  If patient requires additional rate control despite the use of metoprolol, consider oral digoxin  Continue Eliquis.

## 2024-07-03 NOTE — PROGRESS NOTES
Critical access hospital  Progress Note  Name: Bhavna Al I  MRN: [id number]  Unit/Bed#: E4 -01 I Date of Admission: 6/29/2024   Date of Service: 7/3/2024 I Hospital Day: 4    Assessment & Plan   * Atrial fibrillation with RVR (HCC)  Assessment & Plan  Patient was admitted to [facility] 6/6/2024 to 6/18/2024 for MRSA bacteremia discharge to a rehab facility, presents back to the hospital with respiratory distress requiring BiPAP initially, found to be in A-fib with RVR which possible triggered CHF exacerbation  Initially on presentation heart rate in 150s, received a dose of Cardizem bolus currently heart rate in 100s  She is currently on metoprolol and has been controlled.  Cardiology is on board appreciate recommendations.  Patient received IV digoxin over the weekend  If patient requires additional rate control despite the use of metoprolol, consider oral digoxin  Continue Eliquis.      Pressure ulcer of left buttock, stage 3 (HCC)  Assessment & Plan  On admission.  Continue wound care.    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   ID on board appreciate recommendations.       Acute on chronic diastolic heart failure (HCC)  Assessment & Plan  Wt Readings from Last 3 Encounters:   07/03/24 101 kg (221 lb 12.5 oz)   06/18/24 108 kg (238 lb 8.6 oz)   06/08/24 103 kg (228 lb)     Patient has history of diastolic heart failure, echo 6/8/2024 showed ejection fraction of 65%  According to previous hospitalization her home regimen is Lasix 40  mg daily which was held in the setting of AMANDA and reduced p.o. intake.  No diuretic on nursing home paperwork  Initially requiring BiPAP, currently on room air  Chest x-ray with vascular congestion.  Repeat chest x-ray on 7/2/2024 showed increasing density in the right upper lobe with underlying congestive changes and small effusions. This likely represents asymmetric edema though developing pneumonia cannot be entirely excluded.   Cardiologist on board.  Restarted Lasix 20 mg daily  CT chest without contrast showed mild increased intralobular septal thickening, trace fluid in the left oblique fissure, and mild cardiomegaly with small bilateral pleural effusions consistent with heart failure exacerbation/volume overload.   New, more focal area of groundglass opacity/consolidation measuring 2.0 x 0.6 x 1.0 cm in the right upper lobe, representing either sequelae of volume overload or focal infectious/inflammatory process. Recommend follow-up with repeat chest CT in 3 to 4 months to ensure stability/resolution.           Psoriasis  Assessment & Plan  Patient has psoriasis rash  Patient will need to follow-up with outpatient rheumatologist    Elevated troponin  Assessment & Plan  Suspect in the setting of A-fib with RVR and heart failure  EKG without acute ischemic changes  Discontinue telemetry    Rheumatoid arthritis (HCC)  Assessment & Plan  Continue Plaquenil 200 mg twice daily and prednisone 5 mg twice daily  Follow-up OP rheumatologist    Acute kidney injury superimposed on CKD  (HCC)-resolved as of 7/3/2024  Assessment & Plan  Lab Results   Component Value Date    EGFR 43 07/03/2024    EGFR 46 07/02/2024    EGFR 34 07/01/2024    CREATININE 1.21 07/03/2024    CREATININE 1.16 07/02/2024    CREATININE 1.47 (H) 07/01/2024     AMANDA on CKD most likely due to following depletion and diuresis  Renal function slowly recovering and currently back to baseline  P.o. Lasix restarted per cardiologist recommendations  Follow  BMP         VTE Pharmacologic Prophylaxis:   Pharmacologic: Apixaban (Eliquis)  Mechanical VTE Prophylaxis in Place: Yes    Patient Centered Rounds: I have performed bedside rounds with nursing staff today.    Discussions with Specialists or Other Care Team Provider: Discussed with ID    Education and Discussions with Family / Patient: Discussed with patient    Time Spent for Care:  35 minutes .  This time was spent on one or more of the following: performing physical exam; counseling and coordination of care; obtaining or reviewing history; documenting in the medical record; reviewing/ordering tests, medications or procedures; communicating with other healthcare professionals and discussing with patient's family/caregivers.    Current Length of Stay: 4 day(s)    Current Patient Status: Inpatient   Certification Statement: The patient will continue to require additional inpatient hospital stay due to arranging antibiotic in the facility    Discharge Plan / Estimated Discharge Date: In 24 to 48 hours      Code Status: Level 1 - Full Code      Subjective:   Patient was seen and examined at bedside. The patient  breathing is improved.  She denies any fever, chills, chest pain, palpitation, shortness of breath, N/V, abd pain.      Objective:     Vitals:   Temp (24hrs), Av.4 °F (36.3 °C), Min:97.1 °F (36.2 °C), Max:97.8 °F (36.6 °C)    Temp:  [97.1 °F (36.2 °C)-97.8 °F (36.6 °C)] 97.8 °F (36.6 °C)  HR:  [74-86] 75  Resp:  [18-20] 20  BP: (119-129)/(61-71) 120/70  SpO2:  [97 %-100 %] 100 %  Body mass index is 41.91 kg/m².     Input and Output Summary (last 24 hours):       Intake/Output Summary (Last 24 hours) at 7/3/2024 1551  Last data filed at 7/3/2024 1351  Gross per 24 hour   Intake 238 ml   Output 350 ml   Net -112 ml       Physical Exam:     Physical Exam  General: no acute distress  HEENT: NC/AT, PERRL, EOM - normal  Neck: Supple  Pulm/Chest: Normal chest wall expansion, clear breath sounds on both side  CVS:  normal S1&S2, capillary refill <2s  Abd: soft, non tender, non distended, bowel sounds +  MSK: move all 4 extremities spontaneously  Skin: warm  CNS: no acute focal neuro deficit      Additional Data:     Labs:    Results from last 7 days   Lab Units 07/03/24  0427   WBC Thousand/uL 3.83*   HEMOGLOBIN g/dL 8.8*   HEMATOCRIT % 28.4*   PLATELETS Thousands/uL 167   SEGS PCT % 73   LYMPHO PCT % 13*   MONO PCT % 9   EOS PCT % 3     Results from last 7 days   Lab Units 07/03/24  0427 07/02/24  0529 07/01/24  0528   POTASSIUM mmol/L 3.8   < > 3.5   CHLORIDE mmol/L 108   < > 108   CO2 mmol/L 26   < > 28   BUN mg/dL 13   < > 13   CREATININE mg/dL 1.21   < > 1.47*   CALCIUM mg/dL 8.4   < > 8.5   ALK PHOS U/L  --   --  39   ALT U/L  --   --  28   AST U/L  --   --  48*    < > = values in this interval not displayed.     Results from last 7 days   Lab Units 06/29/24  1450   INR  1.42*       * I Have Reviewed All Lab Data Listed Above.  * Additional Pertinent Lab Tests Reviewed: All Labs For Current Hospital Admission Reviewed    Imaging:      I have reviewed pertinent imaging.      Recent Cultures (last 7 days):     Results from last 7 days   Lab Units 06/29/24  1450 06/29/24  1449   BLOOD CULTURE  No Growth at 72 hrs. No Growth at 72 hrs.       Last 24 Hours Medication List:   Current Facility-Administered Medications   Medication Dose Route Frequency Provider Last Rate    acetaminophen  650 mg Oral Q4H PRN Shania Velarde MD      albuterol  2 puff Inhalation Q6H PRN Shania Velarde MD      ammonium lactate   Topical BID Genie Abbasi MD      apixaban  5 mg Oral BID Shania Velarde MD      furosemide  20 mg Oral Daily Kostas Bruce DO      hydroxychloroquine  200 mg Oral BID Shania Velarde MD      metoprolol tartrate  25 mg Oral Q12H [facility] Kostas Bruce DO      MAGALY ANTIFUNGAL   Topical BID Genie Abbasi MD      pantoprazole  40 mg Oral Early Morning Shania Velarde MD      predniSONE  5 mg Oral BID With  Meals Shania Velarde MD      vancomycin  750 mg Intravenous Q24H AZIZA Barillas 750 mg (07/03/24 1537)        Today, Patient Was Seen By: Genie Abbasi MD    ** Please Note: Dragon 360 Dictation voice to text software may have been used in the creation of this document. **

## 2024-07-03 NOTE — ASSESSMENT & PLAN NOTE
Lab Results   Component Value Date    EGFR 43 07/03/2024    EGFR 46 07/02/2024    EGFR 34 07/01/2024    CREATININE 1.21 07/03/2024    CREATININE 1.16 07/02/2024    CREATININE 1.47 (H) 07/01/2024     AMANDA on CKD most likely due to following depletion and diuresis  Renal function slowly recovering and currently back to baseline  P.o. Lasix restarted per cardiologist recommendations  Follow BMP

## 2024-07-03 NOTE — ASSESSMENT & PLAN NOTE
Wt Readings from Last 3 Encounters:   07/03/24 101 kg (221 lb 12.5 oz)   06/18/24 108 kg (238 lb 8.6 oz)   06/08/24 103 kg (228 lb)     Patient has history of diastolic heart failure, echo 6/8/2024 showed ejection fraction of 65%  According to previous hospitalization her home regimen is Lasix 40 mg daily which was held in the setting of AMANDA and reduced p.o. intake.  No diuretic on nursing home paperwork  Initially requiring BiPAP, currently on room air  Chest x-ray with vascular congestion.  Repeat chest x-ray on 7/2/2024 showed increasing density in the right upper lobe with underlying congestive changes and small effusions. This likely represents asymmetric edema though developing pneumonia cannot be entirely excluded.   Cardiologist on board.  Restarted Lasix 20 mg daily  CT chest without contrast showed mild increased intralobular septal thickening, trace fluid in the left oblique fissure, and mild cardiomegaly with small bilateral pleural effusions consistent with heart failure exacerbation/volume overload.   New, more focal area of groundglass opacity/consolidation measuring 2.0 x 0.6 x 1.0 cm in the right upper lobe, representing either sequelae of volume overload or focal infectious/inflammatory process. Recommend follow-up with repeat chest CT in 3 to 4 months to ensure stability/resolution.

## 2024-07-04 LAB
ANION GAP SERPL CALCULATED.3IONS-SCNC: 6 MMOL/L (ref 4–13)
BACTERIA BLD CULT: NORMAL
BACTERIA BLD CULT: NORMAL
BASOPHILS # BLD AUTO: 0.03 THOUSANDS/ÂΜL (ref 0–0.1)
BASOPHILS NFR BLD AUTO: 1 % (ref 0–1)
BUN SERPL-MCNC: 15 MG/DL (ref 5–25)
CALCIUM SERPL-MCNC: 8.4 MG/DL (ref 8.4–10.2)
CHLORIDE SERPL-SCNC: 107 MMOL/L (ref 96–108)
CK SERPL-CCNC: 78 U/L (ref 26–192)
CO2 SERPL-SCNC: 27 MMOL/L (ref 21–32)
CREAT SERPL-MCNC: 1.32 MG/DL (ref 0.6–1.3)
EOSINOPHIL # BLD AUTO: 0.17 THOUSAND/ÂΜL (ref 0–0.61)
EOSINOPHIL NFR BLD AUTO: 4 % (ref 0–6)
ERYTHROCYTE [DISTWIDTH] IN BLOOD BY AUTOMATED COUNT: 16 % (ref 11.6–15.1)
GFR SERPL CREATININE-BSD FRML MDRD: 39 ML/MIN/1.73SQ M
GLUCOSE SERPL-MCNC: 85 MG/DL (ref 65–140)
HCT VFR BLD AUTO: 28.5 % (ref 34.8–46.1)
HGB BLD-MCNC: 9 G/DL (ref 11.5–15.4)
IMM GRANULOCYTES # BLD AUTO: 0.04 THOUSAND/UL (ref 0–0.2)
IMM GRANULOCYTES NFR BLD AUTO: 1 % (ref 0–2)
LYMPHOCYTES # BLD AUTO: 0.71 THOUSANDS/ÂΜL (ref 0.6–4.47)
LYMPHOCYTES NFR BLD AUTO: 17 % (ref 14–44)
MCH RBC QN AUTO: 28.2 PG (ref 26.8–34.3)
MCHC RBC AUTO-ENTMCNC: 31.6 G/DL (ref 31.4–37.4)
MCV RBC AUTO: 89 FL (ref 82–98)
MONOCYTES # BLD AUTO: 0.4 THOUSAND/ÂΜL (ref 0.17–1.22)
MONOCYTES NFR BLD AUTO: 10 % (ref 4–12)
NEUTROPHILS # BLD AUTO: 2.8 THOUSANDS/ÂΜL (ref 1.85–7.62)
NEUTS SEG NFR BLD AUTO: 67 % (ref 43–75)
NRBC BLD AUTO-RTO: 0 /100 WBCS
PLATELET # BLD AUTO: 193 THOUSANDS/UL (ref 149–390)
PMV BLD AUTO: 12.1 FL (ref 8.9–12.7)
POTASSIUM SERPL-SCNC: 3.4 MMOL/L (ref 3.5–5.3)
RBC # BLD AUTO: 3.19 MILLION/UL (ref 3.81–5.12)
SODIUM SERPL-SCNC: 140 MMOL/L (ref 135–147)
WBC # BLD AUTO: 4.15 THOUSAND/UL (ref 4.31–10.16)

## 2024-07-04 PROCEDURE — 82550 ASSAY OF CK (CPK): CPT | Performed by: INTERNAL MEDICINE

## 2024-07-04 PROCEDURE — 99233 SBSQ HOSP IP/OBS HIGH 50: CPT | Performed by: INTERNAL MEDICINE

## 2024-07-04 PROCEDURE — 80048 BASIC METABOLIC PNL TOTAL CA: CPT | Performed by: INTERNAL MEDICINE

## 2024-07-04 PROCEDURE — 99232 SBSQ HOSP IP/OBS MODERATE 35: CPT | Performed by: INTERNAL MEDICINE

## 2024-07-04 PROCEDURE — 85025 COMPLETE CBC W/AUTO DIFF WBC: CPT | Performed by: INTERNAL MEDICINE

## 2024-07-04 RX ORDER — POTASSIUM CHLORIDE 20 MEQ/1
20 TABLET, EXTENDED RELEASE ORAL ONCE
Status: COMPLETED | OUTPATIENT
Start: 2024-07-04 | End: 2024-07-04

## 2024-07-04 RX ADMIN — MICONAZOLE NITRATE: 20 CREAM TOPICAL at 17:26

## 2024-07-04 RX ADMIN — AMMONIUM LACTATE: 12 CREAM TOPICAL at 17:26

## 2024-07-04 RX ADMIN — MICONAZOLE NITRATE: 20 CREAM TOPICAL at 10:39

## 2024-07-04 RX ADMIN — AMMONIUM LACTATE: 12 CREAM TOPICAL at 10:07

## 2024-07-04 RX ADMIN — FUROSEMIDE 20 MG: 20 TABLET ORAL at 10:03

## 2024-07-04 RX ADMIN — APIXABAN 5 MG: 5 TABLET, FILM COATED ORAL at 10:03

## 2024-07-04 RX ADMIN — METOPROLOL TARTRATE 25 MG: 25 TABLET, FILM COATED ORAL at 10:03

## 2024-07-04 RX ADMIN — PANTOPRAZOLE SODIUM 40 MG: 40 TABLET, DELAYED RELEASE ORAL at 05:19

## 2024-07-04 RX ADMIN — APIXABAN 5 MG: 5 TABLET, FILM COATED ORAL at 21:45

## 2024-07-04 RX ADMIN — METOPROLOL TARTRATE 25 MG: 25 TABLET, FILM COATED ORAL at 21:45

## 2024-07-04 RX ADMIN — VANCOMYCIN HYDROCHLORIDE 750 MG: 750 INJECTION, SOLUTION INTRAVENOUS at 13:22

## 2024-07-04 RX ADMIN — POTASSIUM CHLORIDE 20 MEQ: 1500 TABLET, EXTENDED RELEASE ORAL at 17:27

## 2024-07-04 RX ADMIN — PREDNISONE 5 MG: 5 TABLET ORAL at 17:26

## 2024-07-04 RX ADMIN — PREDNISONE 5 MG: 5 TABLET ORAL at 10:38

## 2024-07-04 RX ADMIN — HYDROXYCHLOROQUINE SULFATE 200 MG: 200 TABLET ORAL at 17:50

## 2024-07-04 RX ADMIN — HYDROXYCHLOROQUINE SULFATE 200 MG: 200 TABLET ORAL at 10:38

## 2024-07-04 NOTE — PROGRESS NOTES
Progress Note - Infectious Disease   Bhavna Al 73 y.o. female MRN: [id number]  Unit/Bed#: E4 -01 Encounter: [id number]      Impression/Plan:  1. MRSA bacteremia and thoracic spine osteomyelitis, under active treatment.  Diagnosed on last admission.  Patient had initially been on vancomycin but developed issues with leukopenia and possible renal function issue, which led to transition to daptomycin. She is now back inpatient from facility due to acute hypoxic respiratory failure, with additional AMANDA. PICC was removed. Admission blood cultures are negative >48 hours. She continued Daptomycin but developed increasing CK. She has been transitioned back to IV vancomycin and appears to be tolerating this antibiotic without difficulty.  Repeat blood cultures are negative  -continue IV vancomycin  -Pharmacy follow-up for vancomycin dose management  -monitor CBCD, creatinine, and vancomycin trough  -follow up admission blood cultures  -monitor vitals  -patient will need new PICC placed as she will need to continue her plan for 6 weeks of IV antibiotic treatment through 7/22/2024  -Will contact the ID office to arrange follow-up labs and office follow-up.     2. Acute hypoxic respiratory failure.  Suspect that this is related to patient's underlying volume status as she had recently been off diuretics.  Respiratory status is improving again today. This morning her O2 saturation is stable on room air.  -volume management per SLIM  -monitor vitals  -monitor respiratory status     3. Acute metabolic encephalopathy.  Patient very alert and interactive during my visit but is still somewhat confused about having an active infection and needing ongoing treatment.   -antibiotic as above  -recommend aspiration precautions   -serial neuro exams  -supportive care     4. Acute kidney injury. On chronic kidney disease.  This impacts antibiotic dosing. Upon review of her available medical records it appears her baseline creatinine  is approximately 1.1-1.2. Creatinine appears to have peaked at 1.7. This is likely due to volume issues. Also consider role of recent antibiotic use.  Renal function has overall improved but now has bumped a bit.  -monitor creatinine  -dose adjust antibiotic for renal function as needed  -avoid nephrotoxins  -volume management per primary service     5.  Rheumatoid arthritis on immunosuppressive therapy.  Patient chronically on Plaquenil and prednisone. This is risk factor for infection above  -recommend ongoing follow up with rheumatology after discharge      6.  Leukopenia.  Mild and at this point appears to be clinically inconsequential with a neutrophil count well over 1000.  -Recheck CBC with differential    Discussed with the primary service the plan to continue the vancomycin and they agree with the plan    Okay to discharge back to skilled nursing once PICC line has been placed and antibiotics arranged once again with the nursing home.    Antibiotics:  Vancomycin 4  Antibiotics 27    Subjective:  Patient has no fever, chills, sweats; no nausea, vomiting, diarrhea; no cough, shortness of breath; no pain. No new symptoms.    Objective:  Vitals:  Temp:  [96.1 °F (35.6 °C)-98.3 °F (36.8 °C)] 96.1 °F (35.6 °C)  HR:  [72-86] 86  Resp:  [18-20] 18  BP: ()/(46-70) 139/62  SpO2:  [100 %] 100 %  Temp (24hrs), Av.1 °F (36.2 °C), Min:96.1 °F (35.6 °C), Max:98.3 °F (36.8 °C)  Current: Temperature: (!) 96.1 °F (35.6 °C)    Physical Exam:   General Appearance:  Alert, interactive, nontoxic, no acute distress.   Throat: Oropharynx moist without lesions.    Lungs:   Clear to auscultation bilaterally; no wheezes, rhonchi or rales; respirations unlabored   Heart:  RRR; no murmur, rub or gallop   Abdomen:   Soft, non-tender, non-distended, positive bowel sounds.     Extremities: No clubbing, cyanosis or edema   Skin: Stable scaling rash       Labs, Imaging, & Other studies:   All pertinent labs and imaging studies  were personally reviewed  Results from last 7 days   Lab Units 07/04/24  0518 07/03/24  0427 07/02/24  0529   WBC Thousand/uL 4.15* 3.83* 3.76*   HEMOGLOBIN g/dL 9.0* 8.8* 8.7*   PLATELETS Thousands/uL 193 167 157     Results from last 7 days   Lab Units 07/04/24  0518 07/03/24  0427 07/02/24  0529 07/01/24  0528 06/30/24  0547 06/29/24  1450   SODIUM mmol/L 140 141 142 142   < > 140   POTASSIUM mmol/L 3.4* 3.8 3.6 3.5   < > 4.9   CHLORIDE mmol/L 107 108 109* 108   < > 106   CO2 mmol/L 27 26 28 28   < > 22   BUN mg/dL 15 13 11 13   < > 10   CREATININE mg/dL 1.32* 1.21 1.16 1.47*   < > 1.28   EGFR ml/min/1.73sq m 39 43 46 34   < > 40   CALCIUM mg/dL 8.4 8.4 8.0* 8.5   < > 8.9   AST U/L  --   --   --  48*  --  60*   ALT U/L  --   --   --  28  --  31   ALK PHOS U/L  --   --   --  39  --  60    < > = values in this interval not displayed.     Results from last 7 days   Lab Units 06/29/24  1450 06/29/24  1449   BLOOD CULTURE  No Growth After 4 Days. No Growth After 4 Days.     Results from last 7 days   Lab Units 06/29/24  1450   PROCALCITONIN ng/ml 0.23

## 2024-07-04 NOTE — ASSESSMENT & PLAN NOTE
Wt Readings from Last 3 Encounters:   07/04/24 108 kg (238 lb 1.6 oz)   06/18/24 108 kg (238 lb 8.6 oz)   06/08/24 103 kg (228 lb)     Patient has history of diastolic heart failure, echo 6/8/2024 showed ejection fraction of 65%  According to previous hospitalization her home regimen is Lasix 40 mg daily which was held in the setting of AMANDA and reduced p.o. intake.  No diuretic on nursing home paperwork  Initially requiring BiPAP, currently on room air  Chest x-ray with vascular congestion.  Repeat chest x-ray on 7/2/2024 showed increasing density in the right upper lobe with underlying congestive changes and small effusions. This likely represents asymmetric edema though developing pneumonia cannot be entirely excluded.   Cardiologist on board.  Restarted Lasix 20 mg daily  CT chest without contrast showed mild increased intralobular septal thickening, trace fluid in the left oblique fissure, and mild cardiomegaly with small bilateral pleural effusions consistent with heart failure exacerbation/volume overload.   New, more focal area of groundglass opacity/consolidation measuring 2.0 x 0.6 x 1.0 cm in the right upper lobe, representing either sequelae of volume overload or focal infectious/inflammatory process. Recommend follow-up with repeat chest CT in 3 to 4 months to ensure stability/resolution.

## 2024-07-04 NOTE — PLAN OF CARE
Problem: Potential for Falls  Goal: Patient will remain free of falls  Description: INTERVENTIONS:  - Educate patient/family on patient safety including physical limitations  - Instruct patient to call for assistance with activity   - Consult OT/PT to assist with strengthening/mobility   - Keep Call bell within reach  - Keep bed low and locked with side rails adjusted as appropriate  - Keep care items and personal belongings within reach  - Initiate and maintain comfort rounds  - Make Fall Risk Sign visible to staff  - Offer Toileting every  Hours, in advance of need  - Initiate/Maintain alarm  - Obtain necessary fall risk management equipment:   - Apply yellow socks and bracelet for high fall risk patients  - Consider moving patient to room near nurses station  Outcome: Progressing     Problem: Prexisting or High Potential for Compromised Skin Integrity  Goal: Skin integrity is maintained or improved  Description: INTERVENTIONS:  - Identify patients at risk for skin breakdown  - Assess and monitor skin integrity  - Assess and monitor nutrition and hydration status  - Monitor labs   - Assess for incontinence   - Turn and reposition patient  - Assist with mobility/ambulation  - Relieve pressure over bony prominences  - Avoid friction and shearing  - Provide appropriate hygiene as needed including keeping skin clean and dry  - Evaluate need for skin moisturizer/barrier cream  - Collaborate with interdisciplinary team   - Patient/family teaching  - Consider wound care consult   Outcome: Progressing     Problem: INFECTION - ADULT  Goal: Absence or prevention of progression during hospitalization  Description: INTERVENTIONS:  - Assess and monitor for signs and symptoms of infection  - Monitor lab/diagnostic results  - Monitor all insertion sites, i.e. indwelling lines, tubes, and drains  - Monitor endotracheal if appropriate and nasal secretions for changes in amount and color  - [place] appropriate cooling/warming  therapies per order  - Administer medications as ordered  - Instruct and encourage patient and family to use good hand hygiene technique  - Identify and instruct in appropriate isolation precautions for identified infection/condition  Outcome: Progressing  Goal: Absence of fever/infection during neutropenic period  Description: INTERVENTIONS:  - Monitor WBC    Outcome: Progressing     Problem: SAFETY ADULT  Goal: Patient will remain free of falls  Description: INTERVENTIONS:  - Educate patient/family on patient safety including physical limitations  - Instruct patient to call for assistance with activity   - Consult OT/PT to assist with strengthening/mobility   - Keep Call bell within reach  - Keep bed low and locked with side rails adjusted as appropriate  - Keep care items and personal belongings within reach  - Initiate and maintain comfort rounds  - Make Fall Risk Sign visible to staff  - Offer Toileting every  Hours, in advance of need  - Initiate/Maintain alarm  - Obtain necessary fall risk management equipment:   - Apply yellow socks and bracelet for high fall risk patients  - Consider moving patient to room near nurses station  Outcome: Progressing  Goal: Maintain or return to baseline ADL function  Description: INTERVENTIONS:  -  Assess patient's ability to carry out ADLs; assess patient's baseline for ADL function and identify physical deficits which impact ability to perform ADLs (bathing, care of mouth/teeth, toileting, grooming, dressing, etc.)  - Assess/evaluate cause of self-care deficits   - Assess range of motion  - Assess patient's mobility; develop plan if impaired  - Assess patient's need for assistive devices and provide as appropriate  - Encourage maximum independence but intervene and supervise when necessary  - Involve family in performance of ADLs  - Assess for home care needs following discharge   - Consider OT consult to assist with ADL evaluation and planning for discharge  - Provide  patient education as appropriate  Outcome: Progressing  Goal: Maintains/Returns to pre admission functional level  Description: INTERVENTIONS:  - Perform AM-PAC 6 Click Basic Mobility/ Daily Activity assessment daily.  - Set and communicate daily mobility goal to care team and patient/family/caregiver.   - Collaborate with rehabilitation services on mobility goals if consulted  - Perform Range of Motion  times a day.  - Reposition patient every  hours.  - Dangle patient  times a day  - Stand patient  times a day  - Ambulate patient  times a day  - Out of bed to chair  times a day   - Out of bed for meals  times a day  - Out of bed for toileting  - Record patient progress and toleration of activity level   Outcome: Progressing     Problem: DISCHARGE PLANNING  Goal: Discharge to home or other facility with appropriate resources  Description: INTERVENTIONS:  - Identify barriers to discharge w/patient and caregiver  - Arrange for needed discharge resources and transportation as appropriate  - Identify discharge learning needs (meds, wound care, etc.)  - Arrange for interpretive services to assist at discharge as needed  - Refer to Case Management Department for coordinating discharge planning if the patient needs post-hospital services based on physician/advanced practitioner order or complex needs related to functional status, cognitive ability, or social support system  Outcome: Progressing     Problem: Knowledge Deficit  Goal: Patient/family/caregiver demonstrates understanding of disease process, treatment plan, medications, and discharge instructions  Description: Complete learning assessment and assess knowledge base.  Interventions:  - Provide teaching at level of understanding  - Provide teaching via preferred learning methods  Outcome: Progressing     Problem: CARDIOVASCULAR - ADULT  Goal: Maintains optimal cardiac output and hemodynamic stability  Description: INTERVENTIONS:  - Monitor I/O, vital signs and  rhythm  - Monitor for S/S and trends of decreased cardiac output  - Administer and titrate ordered vasoactive medications to optimize hemodynamic stability  - Assess quality of pulses, skin color and temperature  - Assess for signs of decreased coronary artery perfusion  - Instruct patient to report change in severity of symptoms  Outcome: Progressing     Problem: RESPIRATORY - ADULT  Goal: Achieves optimal ventilation and oxygenation  Description: INTERVENTIONS:  - Assess for changes in respiratory status  - Assess for changes in mentation and behavior  - Position to facilitate oxygenation and minimize respiratory effort  - Oxygen administered by appropriate delivery if ordered  - Initiate smoking cessation education as indicated  - Encourage broncho-pulmonary hygiene including cough, deep breathe, Incentive Spirometry  - Assess the need for suctioning and aspirate as needed  - Assess and instruct to report SOB or any respiratory difficulty  - Respiratory Therapy support as indicated  Outcome: Progressing     Problem: SKIN/TISSUE INTEGRITY - ADULT  Goal: Skin Integrity remains intact(Skin Breakdown Prevention)  Description: Assess:  -Perform Gagan assessment every   -Clean and moisturize skin every   -Inspect skin when repositioning, toileting, and assisting with ADLS  -Assess under medical devices such as  every   -Assess extremities for adequate circulation and sensation     Bed Management:  -Have minimal linens on bed & keep smooth, unwrinkled  -Change linens as needed when moist or perspiring  -Avoid sitting or lying in one position for more than  hours while in bed  -Keep HOB at degrees     Toileting:  -Offer bedside commode  -Assess for incontinence every   -Use incontinent care products after each incontinent episode such as     Activity:  -Mobilize patient  times a day  -Encourage activity and walks on unit  -Encourage or provide ROM exercises   -Turn and reposition patient every  Hours  -Use appropriate  equipment to lift or move patient in bed  -Instruct/ Assist with weight shifting every  when out of bed in chair  -Consider limitation of chair time  hour intervals    Skin Care:  -Avoid use of baby powder, tape, friction and shearing, hot water or constrictive clothing  -Relieve pressure over bony prominences using   -Do not massage red bony areas    Next Steps:  -Teach patient strategies to minimize risks such as    -Consider consults to  interdisciplinary teams such as   Outcome: Progressing  Goal: Pressure injury heals and does not worsen  Description: Interventions:  - Implement low air loss mattress or specialty surface (Criteria met)  - Apply silicone foam dressing  - Instruct/assist with weight shifting every  minutes when in chair   - Limit chair time to  hour intervals  - Use special pressure reducing interventions such as  when in chair   - Apply fecal or urinary incontinence containment device   - Perform passive or active ROM every   - Turn and reposition patient & offload bony prominences every  hours   - Utilize friction reducing device or surface for transfers   - Consider consults to  interdisciplinary teams such as   - Use incontinent care products after each incontinent episode such as  - Consider nutrition services referral as needed  Outcome: Progressing     Problem: Nutrition/Hydration-ADULT  Goal: Nutrient/Hydration intake appropriate for improving, restoring or maintaining nutritional needs  Description: Monitor and assess patient's nutrition/hydration status for malnutrition. Collaborate with interdisciplinary team and initiate plan and interventions as ordered.  Monitor patient's weight and dietary intake as ordered or per policy. Utilize nutrition screening tool and intervene as necessary. Determine patient's food preferences and provide high-protein, high-caloric foods as appropriate.     INTERVENTIONS:  - Monitor oral intake, urinary output, labs, and treatment plans  - Assess nutrition  and hydration status and recommend course of action  - Evaluate amount of meals eaten  - Assist patient with eating if necessary   - Allow adequate time for meals  - Recommend/ encourage appropriate diets, oral nutritional supplements, and vitamin/mineral supplements  - Order, calculate, and assess calorie counts as needed  - Recommend, monitor, and adjust tube feedings and TPN/PPN based on assessed needs  - Assess need for intravenous fluids  - Provide specific nutrition/hydration education as appropriate  - Include patient/family/caregiver in decisions related to nutrition  Outcome: Progressing

## 2024-07-04 NOTE — ASSESSMENT & PLAN NOTE
Lab Results   Component Value Date    EGFR 39 07/04/2024    EGFR 43 07/03/2024    EGFR 46 07/02/2024    CREATININE 1.32 (H) 07/04/2024    CREATININE 1.21 07/03/2024    CREATININE 1.16 07/02/2024     AMANDA on CKD most likely due to following depletion and diuresis  Renal function slowly recovering and p.o. Lasix restarted per cardiologist recommendations.    Creatinine slightly higher than her baseline, may need to allow higher creatinine for diuresis.  Follow BMP

## 2024-07-04 NOTE — ASSESSMENT & PLAN NOTE
Patient was admitted to [facility] 6/6/2024 to 6/18/2024 for MRSA bacteremia discharge to a rehab facility, presents back to the hospital with respiratory distress requiring BiPAP initially, found to be in A-fib with RVR which possible triggered CHF exacerbation  Initially on presentation heart rate in 150s, received a dose of Cardizem bolus currently heart rate in 100s  She is currently on metoprolol and has been controlled.  Cardiology is on board appreciate recommendations.  Patient received IV digoxin over the weekend  If patient requires additional rate control despite the use of metoprolol, consider oral digoxin  Continue Eliquis.

## 2024-07-04 NOTE — PROGRESS NOTES
Bhavna Al is a 73 y.o. female who is currently ordered Vancomycin IV with management by the Pharmacy Consult service.  Relevant clinical data and objective / subjective history reviewed.  Vancomycin Assessment:  Indication and Goal AUC/Trough: Bacteremia (goal -600, trough >10), -600, trough >10  Clinical Status: stable  Micro:     Renal Function:  SCr: 1.32 mg/dL  CrCl: 41.8 mL/min  Renal replacement: Not on dialysis  Days of Therapy: 4  Current Dose: 750 mg IV q24h  Vancomycin Plan:  New Dosinmg IV q24h  Estimated AUC: 433 mcg*hr/mL  Estimated Trough: 13.2 mcg/mL  Next Level:  with AM labs  Renal Function Monitoring: Daily BMP and UOP  Pharmacy will continue to follow closely for s/sx of nephrotoxicity, infusion reactions and appropriateness of therapy.  BMP and CBC will be ordered per protocol. We will continue to follow the patient’s culture results and clinical progress daily.    Francisco Marti, Pharmacist

## 2024-07-04 NOTE — PROGRESS NOTES
[facility]  Progress Note  Name: Bhavna Al I  MRN: [id number]  Unit/Bed#: E4 -01 I Date of Admission: 6/29/2024   Date of Service: 7/4/2024 I Hospital Day: 5    Assessment & Plan   * Atrial fibrillation with RVR (HCC)  Assessment & Plan  Patient was admitted to [facility] 6/6/2024 to 6/18/2024 for MRSA bacteremia discharge to a rehab facility, presents back to the hospital with respiratory distress requiring BiPAP initially, found to be in A-fib with RVR which possible triggered CHF exacerbation  Initially on presentation heart rate in 150s, received a dose of Cardizem bolus currently heart rate in 100s  She is currently on metoprolol and has been controlled.  Cardiology is on board appreciate recommendations.  Patient received IV digoxin over the weekend  If patient requires additional rate control despite the use of metoprolol, consider oral digoxin  Continue Eliquis.      Pressure ulcer of left buttock, stage 3 (HCC)  Assessment & Plan  On admission.  Continue wound care.    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   ID on board appreciate recommendations.       Acute on chronic diastolic heart failure (HCC)  Assessment & Plan  Wt Readings from Last 3 Encounters:   07/04/24 108 kg (238 lb 1.6 oz)   06/18/24 108 kg (238 lb 8.6 oz)   06/08/24 103 kg (228 lb)     Patient has history of diastolic heart failure, echo 6/8/2024 showed ejection fraction of 65%  According to previous hospitalization her home regimen is Lasix 40 mg  daily which was held in the setting of AMANDA and reduced p.o. intake.  No diuretic on nursing home paperwork  Initially requiring BiPAP, currently on room air  Chest x-ray with vascular congestion.  Repeat chest x-ray on 7/2/2024 showed increasing density in the right upper lobe with underlying congestive changes and small effusions. This likely represents asymmetric edema though developing pneumonia cannot be entirely excluded.   Cardiologist on board.  Restarted Lasix 20 mg daily  CT chest without contrast showed mild increased intralobular septal thickening, trace fluid in the left oblique fissure, and mild cardiomegaly with small bilateral pleural effusions consistent with heart failure exacerbation/volume overload.   New, more focal area of groundglass opacity/consolidation measuring 2.0 x 0.6 x 1.0 cm in the right upper lobe, representing either sequelae of volume overload or focal infectious/inflammatory process. Recommend follow-up with repeat chest CT in 3 to 4 months to ensure stability/resolution.           Psoriasis  Assessment & Plan  Patient has psoriasis rash  Patient will need to follow-up with outpatient rheumatologist    Elevated troponin  Assessment & Plan  Suspect in the setting of A-fib with RVR and heart failure  EKG without acute ischemic changes  Discontinue telemetry    Rheumatoid arthritis (HCC)  Assessment & Plan  Continue Plaquenil 200 mg twice daily and prednisone 5 mg twice daily  Follow-up OP rheumatologist    Acute kidney injury superimposed on CKD  (HCC)-resolved as of 7/3/2024  Assessment & Plan  Lab Results   Component Value Date    EGFR 39 07/04/2024    EGFR 43 07/03/2024    EGFR 46 07/02/2024    CREATININE 1.32 (H) 07/04/2024    CREATININE 1.21 07/03/2024    CREATININE 1.16 07/02/2024     AMANDA on CKD most likely due to following depletion and diuresis  Renal function slowly recovering and p.o. Lasix restarted per cardiologist recommendations.    Creatinine slightly higher than her  baseline, may need to allow higher creatinine for diuresis.  Follow BMP         VTE Pharmacologic Prophylaxis:   Pharmacologic: Apixaban (Eliquis)  Mechanical VTE Prophylaxis in Place: Yes    Patient Centered Rounds: I have performed bedside rounds with nursing staff today.    Discussions with Specialists or Other Care Team Provider: Discussed with ID    Education and Discussions with Family / Patient: Discussed with patient, niece over the phone    Time Spent for Care:  35 minutes .  This time was spent on one or more of the following: performing physical exam; counseling and coordination of care; obtaining or reviewing history; documenting in the medical record; reviewing/ordering tests, medications or procedures; communicating with other healthcare professionals and discussing with patient's family/caregivers.    Current Length of Stay: 5 day(s)    Current Patient Status: Inpatient   Certification Statement: The patient will continue to require additional inpatient hospital stay due to awaiting arrangement of antibiotic in the facility    Discharge Plan / Estimated Discharge Date: In 24 to 48 hours      Code Status: Level 1 - Full Code      Subjective:   Patient was seen and examined at bedside. The patient stated she is doing fine.  She  denies any chest pain, palpitation, shortness of breath.      Objective:     Vitals:   Temp (24hrs), Av.1 °F (36.2 °C), Min:96.1 °F (35.6 °C), Max:98.3 °F (36.8 °C)    Temp:  [96.1 °F (35.6 °C)-98.3 °F (36.8 °C)] 96.1 °F (35.6 °C)  HR:  [72-86] 86  Resp:  [18] 18  BP: ()/(46-62) 139/62  SpO2:  [100 %] 100 %  Body mass index is 44.99 kg/m².     Input and Output Summary (last 24 hours):       Intake/Output Summary (Last 24 hours) at 2024 1609  Last data filed at 2024 1420  Gross per 24 hour   Intake 720 ml   Output 900 ml   Net -180 ml       Physical Exam:     Physical Exam  General: no acute distress  HEENT: NC/AT, PERRL, EOM - normal  Neck: Supple  Pulm/Chest:  Normal chest wall expansion, clear breath sounds on both side  CVS: normal S1&S2, capillary refill <2s  Abd: soft, non tender, non distended, bowel sounds +  MSK: move all 4 extremities spontaneously  Skin: warm, psoriatic rash  CNS: no acute focal neuro deficit      Additional Data:     Labs:    Results from last 7 days   Lab Units 07/04/24  0518   WBC Thousand/uL 4.15*   HEMOGLOBIN g/dL 9.0*   HEMATOCRIT % 28.5*   PLATELETS Thousands/uL 193   SEGS PCT % 67   LYMPHO PCT % 17   MONO PCT % 10   EOS PCT % 4     Results from last 7 days   Lab Units 07/04/24  0518 07/02/24  0529 07/01/24  0528   POTASSIUM mmol/L 3.4*   < > 3.5   CHLORIDE mmol/L 107   < > 108   CO2 mmol/L 27   < > 28   BUN mg/dL 15   < > 13   CREATININE mg/dL 1.32*   < > 1.47*   CALCIUM mg/dL 8.4   < > 8.5   ALK PHOS U/L  --   --  39   ALT U/L  --   --  28   AST U/L  --   --  48*    < > = values in this interval not displayed.     Results from last 7 days   Lab Units 06/29/24  1450   INR  1.42*       * I Have Reviewed All Lab Data Listed Above.  * Additional Pertinent Lab Tests Reviewed: All Labs For Current Hospital Admission Reviewed    Imaging:      I have reviewed pertinent imaging.      Recent Cultures (last 7 days):     Results from last 7 days   Lab Units 06/29/24  1450 06/29/24  1449   BLOOD CULTURE  No Growth After 4 Days. No Growth After 4 Days.       Last 24 Hours Medication List:   Current Facility-Administered Medications   Medication Dose Route Frequency Provider Last Rate    acetaminophen  650 mg Oral Q4H PRN Shania Velarde MD      albuterol  2 puff Inhalation Q6H PRN Shania Velarde MD      ammonium lactate   Topical BID Genie Abbasi MD      apixaban  5 mg Oral BID Shania Velarde MD      furosemide  20 mg Oral Daily Kostas Bruce,       hydroxychloroquine  200 mg Oral BID Shania Velarde MD      metoprolol tartrate  25 mg Oral Q12H [facility] Kosats Bruce, DO      MAGALY ANTIFUNGAL   Topical BID Genie Abbasi MD       pantoprazole  40 mg Oral Early Morning Shania Velarde MD      potassium chloride  20 mEq Oral Once Genie Abbasi MD      predniSONE  5 mg Oral BID With Meals Shania Velarde MD      vancomycin  750 mg Intravenous Q24H AZIZA Barillas 750 mg (07/04/24 1322)        Today, Patient Was Seen By: Genie Abbasi MD    ** Please Note: Dragon 360 Dictation voice to text software may have been used in the creation of this document. **

## 2024-07-04 NOTE — ASSESSMENT & PLAN NOTE
Recent MRSA bacteremia with acute osteomyelitis of thoracic spine  Due to rising CPK, IV daptomycin was changed back to IV vancomycin through 7/22/2024  Monitor ESR and CRP weekly  Repeat blood cultures showed no growth > 48 hours  Patient received PICC line placement on 7/24.   ID on board appreciate recommendations.

## 2024-07-05 VITALS
SYSTOLIC BLOOD PRESSURE: 131 MMHG | RESPIRATION RATE: 18 BRPM | BODY MASS INDEX: 44.95 KG/M2 | HEIGHT: 61 IN | WEIGHT: 238.1 LBS | OXYGEN SATURATION: 100 % | DIASTOLIC BLOOD PRESSURE: 65 MMHG | HEART RATE: 68 BPM | TEMPERATURE: 98.7 F

## 2024-07-05 LAB
ANION GAP SERPL CALCULATED.3IONS-SCNC: 6 MMOL/L (ref 4–13)
BASOPHILS # BLD AUTO: 0.02 THOUSANDS/ÂΜL (ref 0–0.1)
BASOPHILS NFR BLD AUTO: 1 % (ref 0–1)
BUN SERPL-MCNC: 15 MG/DL (ref 5–25)
CALCIUM SERPL-MCNC: 8.3 MG/DL (ref 8.4–10.2)
CHLORIDE SERPL-SCNC: 106 MMOL/L (ref 96–108)
CK SERPL-CCNC: 42 U/L (ref 26–192)
CO2 SERPL-SCNC: 29 MMOL/L (ref 21–32)
CREAT SERPL-MCNC: 1.21 MG/DL (ref 0.6–1.3)
EOSINOPHIL # BLD AUTO: 0.13 THOUSAND/ÂΜL (ref 0–0.61)
EOSINOPHIL NFR BLD AUTO: 3 % (ref 0–6)
ERYTHROCYTE [DISTWIDTH] IN BLOOD BY AUTOMATED COUNT: 16.1 % (ref 11.6–15.1)
GFR SERPL CREATININE-BSD FRML MDRD: 43 ML/MIN/1.73SQ M
GLUCOSE SERPL-MCNC: 78 MG/DL (ref 65–140)
HCT VFR BLD AUTO: 27.9 % (ref 34.8–46.1)
HGB BLD-MCNC: 8.6 G/DL (ref 11.5–15.4)
IMM GRANULOCYTES # BLD AUTO: 0.04 THOUSAND/UL (ref 0–0.2)
IMM GRANULOCYTES NFR BLD AUTO: 1 % (ref 0–2)
LYMPHOCYTES # BLD AUTO: 0.64 THOUSANDS/ÂΜL (ref 0.6–4.47)
LYMPHOCYTES NFR BLD AUTO: 17 % (ref 14–44)
MCH RBC QN AUTO: 27.9 PG (ref 26.8–34.3)
MCHC RBC AUTO-ENTMCNC: 30.8 G/DL (ref 31.4–37.4)
MCV RBC AUTO: 91 FL (ref 82–98)
MONOCYTES # BLD AUTO: 0.32 THOUSAND/ÂΜL (ref 0.17–1.22)
MONOCYTES NFR BLD AUTO: 8 % (ref 4–12)
NEUTROPHILS # BLD AUTO: 2.71 THOUSANDS/ÂΜL (ref 1.85–7.62)
NEUTS SEG NFR BLD AUTO: 70 % (ref 43–75)
NRBC BLD AUTO-RTO: 0 /100 WBCS
PLATELET # BLD AUTO: 178 THOUSANDS/UL (ref 149–390)
PMV BLD AUTO: 12.1 FL (ref 8.9–12.7)
POTASSIUM SERPL-SCNC: 3.8 MMOL/L (ref 3.5–5.3)
RBC # BLD AUTO: 3.08 MILLION/UL (ref 3.81–5.12)
SODIUM SERPL-SCNC: 141 MMOL/L (ref 135–147)
WBC # BLD AUTO: 3.86 THOUSAND/UL (ref 4.31–10.16)

## 2024-07-05 PROCEDURE — 99233 SBSQ HOSP IP/OBS HIGH 50: CPT | Performed by: INTERNAL MEDICINE

## 2024-07-05 PROCEDURE — 99239 HOSP IP/OBS DSCHRG MGMT >30: CPT | Performed by: INTERNAL MEDICINE

## 2024-07-05 PROCEDURE — 80048 BASIC METABOLIC PNL TOTAL CA: CPT | Performed by: INTERNAL MEDICINE

## 2024-07-05 PROCEDURE — 82550 ASSAY OF CK (CPK): CPT | Performed by: INTERNAL MEDICINE

## 2024-07-05 PROCEDURE — 85025 COMPLETE CBC W/AUTO DIFF WBC: CPT | Performed by: INTERNAL MEDICINE

## 2024-07-05 RX ORDER — FUROSEMIDE 20 MG/1
20 TABLET ORAL DAILY
Status: ON HOLD
Start: 2024-07-06 | End: 2024-08-05

## 2024-07-05 RX ORDER — AMMONIUM LACTATE 12 G/100G
CREAM TOPICAL 2 TIMES DAILY
Status: ON HOLD
Start: 2024-07-05

## 2024-07-05 RX ADMIN — FUROSEMIDE 20 MG: 20 TABLET ORAL at 09:17

## 2024-07-05 RX ADMIN — MICONAZOLE NITRATE: 20 CREAM TOPICAL at 09:17

## 2024-07-05 RX ADMIN — HYDROXYCHLOROQUINE SULFATE 200 MG: 200 TABLET ORAL at 09:17

## 2024-07-05 RX ADMIN — VANCOMYCIN HYDROCHLORIDE 750 MG: 750 INJECTION, SOLUTION INTRAVENOUS at 14:14

## 2024-07-05 RX ADMIN — AMMONIUM LACTATE: 12 CREAM TOPICAL at 09:17

## 2024-07-05 RX ADMIN — APIXABAN 5 MG: 5 TABLET, FILM COATED ORAL at 09:17

## 2024-07-05 RX ADMIN — AMMONIUM LACTATE: 12 CREAM TOPICAL at 17:34

## 2024-07-05 RX ADMIN — HYDROXYCHLOROQUINE SULFATE 200 MG: 200 TABLET ORAL at 17:32

## 2024-07-05 RX ADMIN — METOPROLOL TARTRATE 25 MG: 25 TABLET, FILM COATED ORAL at 09:17

## 2024-07-05 RX ADMIN — MICONAZOLE NITRATE: 20 CREAM TOPICAL at 17:34

## 2024-07-05 RX ADMIN — PANTOPRAZOLE SODIUM 40 MG: 40 TABLET, DELAYED RELEASE ORAL at 06:45

## 2024-07-05 RX ADMIN — PREDNISONE 5 MG: 5 TABLET ORAL at 06:45

## 2024-07-05 RX ADMIN — PREDNISONE 5 MG: 5 TABLET ORAL at 17:32

## 2024-07-05 NOTE — PLAN OF CARE
Problem: Potential for Falls  Goal: Patient will remain free of falls  Description: INTERVENTIONS:  - Educate patient/family on patient safety including physical limitations  - Instruct patient to call for assistance with activity   - Consult OT/PT to assist with strengthening/mobility   - Keep Call bell within reach  - Keep bed low and locked with side rails adjusted as appropriate  - Keep care items and personal belongings within reach  - Initiate and maintain comfort rounds  - Make Fall Risk Sign visible to staff  - Apply yellow socks and bracelet for high fall risk patients  - Consider moving patient to room near nurses station  Outcome: Progressing     Problem: INFECTION - ADULT  Goal: Absence or prevention of progression during hospitalization  Description: INTERVENTIONS:  - Assess and monitor for signs and symptoms of infection  - Monitor lab/diagnostic results  - Monitor all insertion sites, i.e. indwelling lines, tubes, and drains  - Monitor endotracheal if appropriate and nasal secretions for changes in amount and color  - [place] appropriate cooling/warming therapies per order  - Administer medications as ordered  - Instruct and encourage patient and family to use good hand hygiene technique  - Identify and instruct in appropriate isolation precautions for identified infection/condition  Outcome: Progressing     Problem: SAFETY ADULT  Goal: Patient will remain free of falls  Description: INTERVENTIONS:  - Educate patient/family on patient safety including physical limitations  - Instruct patient to call for assistance with activity   - Consult OT/PT to assist with strengthening/mobility   - Keep Call bell within reach  - Keep bed low and locked with side rails adjusted as appropriate  - Keep care items and personal belongings within reach  - Initiate and maintain comfort rounds  - Make Fall Risk Sign visible to staff  - Apply yellow socks and bracelet for high fall risk patients  - Consider moving  patient to room near nurses station  Outcome: Progressing     Problem: DISCHARGE PLANNING  Goal: Discharge to home or other facility with appropriate resources  Description: INTERVENTIONS:  - Identify barriers to discharge w/patient and caregiver  - Arrange for needed discharge resources and transportation as appropriate  - Identify discharge learning needs (meds, wound care, etc.)  - Arrange for interpretive services to assist at discharge as needed  - Refer to Case Management Department for coordinating discharge planning if the patient needs post-hospital services based on physician/advanced practitioner order or complex needs related to functional status, cognitive ability, or social support system  Outcome: Progressing     Problem: Knowledge Deficit  Goal: Patient/family/caregiver demonstrates understanding of disease process, treatment plan, medications, and discharge instructions  Description: Complete learning assessment and assess knowledge base.  Interventions:  - Provide teaching at level of understanding  - Provide teaching via preferred learning methods  Outcome: Progressing     Problem: CARDIOVASCULAR - ADULT  Goal: Maintains optimal cardiac output and hemodynamic stability  Description: INTERVENTIONS:  - Monitor I/O, vital signs and rhythm  - Monitor for S/S and trends of decreased cardiac output  - Administer and titrate ordered vasoactive medications to optimize hemodynamic stability  - Assess quality of pulses, skin color and temperature  - Assess for signs of decreased coronary artery perfusion  - Instruct patient to report change in severity of symptoms  Outcome: Progressing     Problem: RESPIRATORY - ADULT  Goal: Achieves optimal ventilation and oxygenation  Description: INTERVENTIONS:  - Assess for changes in respiratory status  - Assess for changes in mentation and behavior  - Position to facilitate oxygenation and minimize respiratory effort  - Oxygen administered by appropriate delivery if  ordered  - Initiate smoking cessation education as indicated  - Encourage broncho-pulmonary hygiene including cough, deep breathe, Incentive Spirometry  - Assess the need for suctioning and aspirate as needed  - Assess and instruct to report SOB or any respiratory difficulty  - Respiratory Therapy support as indicated  Outcome: Progressing     Problem: SKIN/TISSUE INTEGRITY - ADULT  Goal: Skin Integrity remains intact(Skin Breakdown Prevention)  Description: Assess:  -Assess extremities for adequate circulation and sensation     Bed Management:  -Have minimal linens on bed & keep smooth, unwrinkles  Outcome: Progressing     Problem: Nutrition/Hydration-ADULT  Goal: Nutrient/Hydration intake appropriate for improving, restoring or maintaining nutritional needs  Description: Monitor and assess patient's nutrition/hydration status for malnutrition. Collaborate with interdisciplinary team and initiate plan and interventions as ordered.  Monitor patient's weight and dietary intake as ordered or per policy. Utilize nutrition screening tool and intervene as necessary. Determine patient's food preferences and provide high-protein, high-caloric foods as appropriate.     INTERVENTIONS:  - Monitor oral intake, urinary output, labs, and treatment plans  - Assess nutrition and hydration status and recommend course of action  - Evaluate amount of meals eaten  - Assist patient with eating if necessary   - Allow adequate time for meals  - Recommend/ encourage appropriate diets, oral nutritional supplements, and vitamin/mineral supplements  - Order, calculate, and assess calorie counts as needed  - Recommend, monitor, and adjust tube feedings and TPN/PPN based on assessed needs  - Assess need for intravenous fluids  - Provide specific nutrition/hydration education as appropriate  - Include patient/family/caregiver in decisions related to nutrition  Outcome: Progressing

## 2024-07-05 NOTE — ASSESSMENT & PLAN NOTE
Lab Results   Component Value Date    EGFR 43 07/05/2024    EGFR 39 07/04/2024    EGFR 43 07/03/2024    CREATININE 1.21 07/05/2024    CREATININE 1.32 (H) 07/04/2024    CREATININE 1.21 07/03/2024     AMANDA on CKD most likely due to following depletion and diuresis  Renal function slowly recovering and p.o. Lasix restarted per cardiologist recommendations.    Creatinine slightly higher than her baseline, may need to allow higher creatinine for diuresis.  Follow BMP

## 2024-07-05 NOTE — DISCHARGE SUMMARY
[facility]  Discharge- Bhavna Al 1950, 73 y.o. female MRN: [id number]  Unit/Bed#: E4 -01 Encounter: [id number]  Primary Care Provider: Nya Taveras DO   Date and time admitted to hospital: 6/29/2024  2:02 PM    * Atrial fibrillation with RVR (HCC)  Assessment & Plan  Patient was admitted to [facility] 6/6/2024 to 6/18/2024 for MRSA bacteremia discharge to a rehab facility, presents back to the hospital with respiratory distress requiring BiPAP initially, found to be in A-fib with RVR which possible triggered CHF exacerbation  Initially on presentation heart rate in 150s, received a dose of Cardizem bolus currently heart rate in 100s  She is currently on metoprolol and has been controlled.  Cardiology is on board appreciate recommendations.  Patient received IV digoxin over the weekend  If patient requires additional rate control despite the use of metoprolol, consider oral digoxin  Continue Eliquis.      Pressure ulcer of left buttock, stage 3 (HCC)  Assessment & Plan  On admission.  Continue wound care.    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   ID on board appreciate recommendations.       Acute on chronic diastolic heart failure (HCC)  Assessment & Plan  Wt Readings from Last 3 Encounters:   07/05/24 108 kg (238 lb 1.6 oz)   06/18/24 108 kg (238 lb 8.6 oz)   06/08/24 103 kg (228 lb)     Patient has history of diastolic heart failure, echo 6/8/2024 showed ejection fraction of 65%  According to previous  hospitalization her home regimen is Lasix 40 mg daily which was held in the setting of AMANDA and reduced p.o. intake.  No diuretic on nursing home paperwork  Initially requiring BiPAP, currently on room air  Chest x-ray with vascular congestion.  Repeat chest x-ray on 7/2/2024 showed increasing density in the right upper lobe with underlying congestive changes and small effusions. This likely represents asymmetric edema though developing pneumonia cannot be entirely excluded.   Cardiologist on board.  Restarted Lasix 20 mg daily  CT chest without contrast showed mild increased intralobular septal thickening, trace fluid in the left oblique fissure, and mild cardiomegaly with small bilateral pleural effusions consistent with heart failure exacerbation/volume overload.   New, more focal area of groundglass opacity/consolidation measuring 2.0 x 0.6 x 1.0 cm in the right upper lobe, representing either sequelae of volume overload or focal infectious/inflammatory process. Recommend follow-up with repeat chest CT in 3 to 4 months to ensure stability/resolution.           Psoriasis  Assessment & Plan  Patient has psoriasis rash  Patient will need to follow-up with outpatient rheumatologist    Elevated troponin  Assessment & Plan  Suspect in the setting of A-fib with RVR and heart failure  EKG without acute ischemic changes  Discontinue telemetry    Rheumatoid arthritis (HCC)  Assessment & Plan  Continue Plaquenil 200 mg twice daily and prednisone 5 mg twice daily  Follow-up OP rheumatologist    Acute kidney injury superimposed on CKD  (HCC)-resolved as of 7/3/2024  Assessment & Plan  Lab Results   Component Value Date    EGFR 43 07/05/2024    EGFR 39 07/04/2024    EGFR 43 07/03/2024    CREATININE 1.21 07/05/2024    CREATININE 1.32 (H) 07/04/2024    CREATININE 1.21 07/03/2024     AMANDA on CKD most likely due to following depletion and diuresis  Renal function slowly recovering and p.o. Lasix restarted per cardiologist  recommendations.    Creatinine slightly higher than her baseline, may need to allow higher creatinine for diuresis.  Follow BMP        Discharging Physician / Practitioner: Genie Abbasi MD  PCP: Nya Taveras DO  Admission Date:   Admission Orders (From admission, onward)       Ordered        06/29/24 1732  INPATIENT ADMISSION  Once                          Discharge Date: 07/05/24    Medical Problems       Resolved Problems  Date Reviewed: 7/5/2024            Resolved    Acute kidney injury superimposed on CKD  (HCC) 7/3/2024     Resolved by  Genie Abbasi MD          Consultations During Hospital Stay:  Cardiologist, ID    Procedures Performed:   N/A    Significant Findings / Test Results:   CT chest wo contrast  Result Date: 7/3/2024  Impression: 1. Mild increased intralobular septal thickening, trace fluid in the left oblique fissure, and mild cardiomegaly with small bilateral pleural effusions consistent with heart failure exacerbation/volume overload. 2. New, more focal area of groundglass opacity/consolidation measuring 2.0 x 0.6 x 1.0 cm in the right upper lobe, representing either sequelae of volume overload or focal infectious/inflammatory process. Recommend follow-up with repeat chest CT in 3 to 4 months to ensure stability/resolution. Resident: REDD AHN I, the attending radiologist, have reviewed the images and agree with the final report above. Workstation performed: [id number]     XR chest PICC line portable  Result Date: 7/3/2024  Narrative: XR CHEST PICC LINE PORTABLE INDICATION: left picc insertion. COMPARISON: July 1, 2024 FINDINGS: Left PICC tip projects at the level of the SVC just above the caval atrial junction. Right IJ central catheter has tip projecting in the upper portion of the right atrium. Cardiac monitor leads on the chest. Some other scattered artifacts are noted. Limited inspiration. Platelike atelectasis noted bilaterally. Low lung volumes. No pneumothorax or pleural  effusion. Normal cardiomediastinal silhouette. There is some arthritis of the acromioclavicular joints Normal upper abdomen.     Impression: Left PICC has the tip projecting in the distal portion of the SVC just above the caval atrial junction in satisfactory position. Right IJ line has the tip in the upper portion of the right atrium, stable from prior. Bilateral platelike atelectasis with low lung volumes Workstation performed: [id number]     XR chest portable  Result Date: 7/2/2024  Impression: Increasing density in the right upper lobe with underlying congestive changes and small effusions. This likely represents asymmetric edema though developing pneumonia cannot be entirely excluded. The study was marked in EPIC for immediate notification. Workstation performed: [id number]     XR chest 1 view portable  Result Date: 6/30/2024  Impression: Mild to moderate congestive changes. Workstation performed: [id number]     XR chest portable  Result Date: 6/29/2024  Impression: No PICC line identified. Workstation performed: [id number]       Incidental Findings:   See above     Test Results Pending at Discharge (will require follow up):   N/A     Outpatient Tests Requested:  N/A     Complications: N/A    Reason for Admission: Respiratory distress    Hospital Course:     Bhavna Al is a 73 y.o. female patient with PMH of RA on Plaquenil and chronic prednisone, psoriasis, GERD, sarcoidosis, osteoporosis, hyperparathyroidism, A-fib, CHF, CKD who originally presented to the hospital on 6/29/2024 due to respiratory distress.  Of note, patient had history of MSSA bacteremia, T9-T10 discitis/vertebral osteomyelitis.  She was planned for 6 weeks of IV antibiotic until 7/22/2024.  During this hospital stay, patient was found to be acute hypoxic respiratory failure and initially placed on BiPAP.  Patient was started on IV diuretics. Patient had A-fib with RVR and heart rate got controlled after adjusting metoprolol, digoxin and  "Cardizem.  Cardiology was on board.  Patient diuretic was briefly on hold due to AMANDA.  Currently, renal function remains stable on home dose diuretics.  ID is on board.  Recommended IV vancomycin until 7/20/2024.  Patient is recommended to follow-up with PCP, cardiology and ID as an outpatient.  Patient will need to follow-up with rheumatologist for rheumatoid arthritis.    Patient is clinically and hemodynamically stable to be discharged today.    Please see above list of diagnoses and related plan for additional information.     Condition at Discharge: stable     Discharge Day Visit / Exam:     Subjective:  Patient was seen and examined at bedside. The patient denies any chest pain, palpitation, shortness of breath, N/V, abd pain.    Vitals: Blood Pressure: 145/74 (07/05/24 0830)  Pulse: 85 (07/05/24 0830)  Temperature: 98.7 °F (37.1 °C) (07/05/24 0830)  Temp Source: Temporal (07/05/24 0830)  Respirations: 18 (07/05/24 0830)  Height: 5' 1\" (154.9 cm) (06/29/24 2218)  Weight - Scale: 108 kg (238 lb 1.6 oz) (07/05/24 0600)  SpO2: 100 % (07/05/24 0830)  Exam:   Physical Exam  General: no acute distress  HEENT: NC/AT, PERRL, EOM - normal  Neck: Supple  Pulm/Chest: Normal chest wall expansion, clear breath sounds on both side  CVS: normal S1&S2, capillary refill <2s  Abd: soft, non tender, non distended, bowel sounds +  MSK: move all 4 extremities spontaneously  Skin: warm psoriasis rash  CNS: no acute focal neuro deficit    Discussion with Family: Discussed with granddaughter    Discharge instructions/Information to patient and family:   See after visit summary for information provided to patient and family.      Provisions for Follow-Up Care:  See after visit summary for information related to follow-up care and any pertinent home health orders.      Disposition:     Other Skilled Nursing Facility at [facility]    For Discharges to [facility] SNF:   Not Applicable to this Patient - Not Applicable to " this Patient    Planned Readmission: No     Discharge Statement:  I spent 45 minutes discharging the patient. This time was spent on the day of discharge. I had direct contact with the patient on the day of discharge. Greater than 50% of the total time was spent examining patient, answering all patient questions, arranging and discussing plan of care with patient as well as directly providing post-discharge instructions.  Additional time then spent on discharge activities.    Discharge Medications:  See after visit summary for reconciled discharge medications provided to patient and family.      ** Please Note: This note has been constructed using a voice recognition system **

## 2024-07-05 NOTE — PLAN OF CARE
Problem: Potential for Falls  Goal: Patient will remain free of falls  Description: INTERVENTIONS:  - Educate patient/family on patient safety including physical limitations  - Instruct patient to call for assistance with activity   - Consult OT/PT to assist with strengthening/mobility   - Keep Call bell within reach  - Keep bed low and locked with side rails adjusted as appropriate  - Keep care items and personal belongings within reach  - Initiate and maintain comfort rounds  - Make Fall Risk Sign visible to staff  - Offer Toileting every  Hours, in advance of need  - Initiate/Maintain alarm  - Obtain necessary fall risk management equipment:   - Apply yellow socks and bracelet for high fall risk patients  - Consider moving patient to room near nurses station  Outcome: Progressing     Problem: Prexisting or High Potential for Compromised Skin Integrity  Goal: Skin integrity is maintained or improved  Description: INTERVENTIONS:  - Identify patients at risk for skin breakdown  - Assess and monitor skin integrity  - Assess and monitor nutrition and hydration status  - Monitor labs   - Assess for incontinence   - Turn and reposition patient  - Assist with mobility/ambulation  - Relieve pressure over bony prominences  - Avoid friction and shearing  - Provide appropriate hygiene as needed including keeping skin clean and dry  - Evaluate need for skin moisturizer/barrier cream  - Collaborate with interdisciplinary team   - Patient/family teaching  - Consider wound care consult   Outcome: Progressing     Problem: INFECTION - ADULT  Goal: Absence or prevention of progression during hospitalization  Description: INTERVENTIONS:  - Assess and monitor for signs and symptoms of infection  - Monitor lab/diagnostic results  - Monitor all insertion sites, i.e. indwelling lines, tubes, and drains  - Monitor endotracheal if appropriate and nasal secretions for changes in amount and color  - [place] appropriate cooling/warming  therapies per order  - Administer medications as ordered  - Instruct and encourage patient and family to use good hand hygiene technique  - Identify and instruct in appropriate isolation precautions for identified infection/condition  Outcome: Progressing  Goal: Absence of fever/infection during neutropenic period  Description: INTERVENTIONS:  - Monitor WBC    Outcome: Progressing     Problem: SAFETY ADULT  Goal: Patient will remain free of falls  Description: INTERVENTIONS:  - Educate patient/family on patient safety including physical limitations  - Instruct patient to call for assistance with activity   - Consult OT/PT to assist with strengthening/mobility   - Keep Call bell within reach  - Keep bed low and locked with side rails adjusted as appropriate  - Keep care items and personal belongings within reach  - Initiate and maintain comfort rounds  - Make Fall Risk Sign visible to staff  - Offer Toileting every  Hours, in advance of need  - Initiate/Maintain alarm  - Obtain necessary fall risk management equipment:   - Apply yellow socks and bracelet for high fall risk patients  - Consider moving patient to room near nurses station  Outcome: Progressing  Goal: Maintain or return to baseline ADL function  Description: INTERVENTIONS:  -  Assess patient's ability to carry out ADLs; assess patient's baseline for ADL function and identify physical deficits which impact ability to perform ADLs (bathing, care of mouth/teeth, toileting, grooming, dressing, etc.)  - Assess/evaluate cause of self-care deficits   - Assess range of motion  - Assess patient's mobility; develop plan if impaired  - Assess patient's need for assistive devices and provide as appropriate  - Encourage maximum independence but intervene and supervise when necessary  - Involve family in performance of ADLs  - Assess for home care needs following discharge   - Consider OT consult to assist with ADL evaluation and planning for discharge  - Provide  patient education as appropriate  Outcome: Progressing  Goal: Maintains/Returns to pre admission functional level  Description: INTERVENTIONS:  - Perform AM-PAC 6 Click Basic Mobility/ Daily Activity assessment daily.  - Set and communicate daily mobility goal to care team and patient/family/caregiver.   - Collaborate with rehabilitation services on mobility goals if consulted  - Perform Range of Motion  times a day.  - Reposition patient every  hours.  - Dangle patient  times a day  - Stand patient  times a day  - Ambulate patient  times a day  - Out of bed to chair  times a day   - Out of bed for meals  times a day  - Out of bed for toileting  - Record patient progress and toleration of activity level   Outcome: Progressing     Problem: DISCHARGE PLANNING  Goal: Discharge to home or other facility with appropriate resources  Description: INTERVENTIONS:  - Identify barriers to discharge w/patient and caregiver  - Arrange for needed discharge resources and transportation as appropriate  - Identify discharge learning needs (meds, wound care, etc.)  - Arrange for interpretive services to assist at discharge as needed  - Refer to Case Management Department for coordinating discharge planning if the patient needs post-hospital services based on physician/advanced practitioner order or complex needs related to functional status, cognitive ability, or social support system  Outcome: Progressing     Problem: Knowledge Deficit  Goal: Patient/family/caregiver demonstrates understanding of disease process, treatment plan, medications, and discharge instructions  Description: Complete learning assessment and assess knowledge base.  Interventions:  - Provide teaching at level of understanding  - Provide teaching via preferred learning methods  Outcome: Progressing     Problem: CARDIOVASCULAR - ADULT  Goal: Maintains optimal cardiac output and hemodynamic stability  Description: INTERVENTIONS:  - Monitor I/O, vital signs and  rhythm  - Monitor for S/S and trends of decreased cardiac output  - Administer and titrate ordered vasoactive medications to optimize hemodynamic stability  - Assess quality of pulses, skin color and temperature  - Assess for signs of decreased coronary artery perfusion  - Instruct patient to report change in severity of symptoms  Outcome: Progressing     Problem: RESPIRATORY - ADULT  Goal: Achieves optimal ventilation and oxygenation  Description: INTERVENTIONS:  - Assess for changes in respiratory status  - Assess for changes in mentation and behavior  - Position to facilitate oxygenation and minimize respiratory effort  - Oxygen administered by appropriate delivery if ordered  - Initiate smoking cessation education as indicated  - Encourage broncho-pulmonary hygiene including cough, deep breathe, Incentive Spirometry  - Assess the need for suctioning and aspirate as needed  - Assess and instruct to report SOB or any respiratory difficulty  - Respiratory Therapy support as indicated  Outcome: Progressing     Problem: SKIN/TISSUE INTEGRITY - ADULT  Goal: Skin Integrity remains intact(Skin Breakdown Prevention)  Description: Assess:  -Perform Gagan assessment every   -Clean and moisturize skin every   -Inspect skin when repositioning, toileting, and assisting with ADLS  -Assess under medical devices such as  every   -Assess extremities for adequate circulation and sensation     Bed Management:  -Have minimal linens on bed & keep smooth, unwrinkled  -Change linens as needed when moist or perspiring  -Avoid sitting or lying in one position for more than  hours while in bed  -Keep HOB at degrees     Toileting:  -Offer bedside commode  -Assess for incontinence every   -Use incontinent care products after each incontinent episode such as     Activity:  -Mobilize patient  times a day  -Encourage activity and walks on unit  -Encourage or provide ROM exercises   -Turn and reposition patient every  Hours  -Use appropriate  equipment to lift or move patient in bed  -Instruct/ Assist with weight shifting every  when out of bed in chair  -Consider limitation of chair time  hour intervals    Skin Care:  -Avoid use of baby powder, tape, friction and shearing, hot water or constrictive clothing  -Relieve pressure over bony prominences using   -Do not massage red bony areas    Next Steps:  -Teach patient strategies to minimize risks such as    -Consider consults to  interdisciplinary teams such as   Outcome: Progressing  Goal: Pressure injury heals and does not worsen  Description: Interventions:  - Implement low air loss mattress or specialty surface (Criteria met)  - Apply silicone foam dressing  - Instruct/assist with weight shifting every  minutes when in chair   - Limit chair time to  hour intervals  - Use special pressure reducing interventions such as  when in chair   - Apply fecal or urinary incontinence containment device   - Perform passive or active ROM every   - Turn and reposition patient & offload bony prominences every  hours   - Utilize friction reducing device or surface for transfers   - Consider consults to  interdisciplinary teams such as   - Use incontinent care products after each incontinent episode such as   - Consider nutrition services referral as needed  Outcome: Progressing     Problem: Nutrition/Hydration-ADULT  Goal: Nutrient/Hydration intake appropriate for improving, restoring or maintaining nutritional needs  Description: Monitor and assess patient's nutrition/hydration status for malnutrition. Collaborate with interdisciplinary team and initiate plan and interventions as ordered.  Monitor patient's weight and dietary intake as ordered or per policy. Utilize nutrition screening tool and intervene as necessary. Determine patient's food preferences and provide high-protein, high-caloric foods as appropriate.     INTERVENTIONS:  - Monitor oral intake, urinary output, labs, and treatment plans  - Assess nutrition  and hydration status and recommend course of action  - Evaluate amount of meals eaten  - Assist patient with eating if necessary   - Allow adequate time for meals  - Recommend/ encourage appropriate diets, oral nutritional supplements, and vitamin/mineral supplements  - Order, calculate, and assess calorie counts as needed  - Recommend, monitor, and adjust tube feedings and TPN/PPN based on assessed needs  - Assess need for intravenous fluids  - Provide specific nutrition/hydration education as appropriate  - Include patient/family/caregiver in decisions related to nutrition  Outcome: Progressing

## 2024-07-05 NOTE — NURSING NOTE
Patient left with all belongings, IV was removed and AVS was given to transport team. Receiving facility called and report given. No questions or concerns at time of discharge.

## 2024-07-05 NOTE — ASSESSMENT & PLAN NOTE
Patient was admitted to [facility] 6/6/2024 to 6/18/2024 for MRSA bacteremia discharge to a rehab facility, presents back to the hospital with respiratory distress requiring BiPAP initially, found to be in A-fib with RVR which possible triggered CHF exacerbation  Initially on presentation heart rate in 150s, received a dose of Cardizem bolus currently heart rate in 100s  She is currently on metoprolol and has been controlled.  Cardiology is on board appreciate recommendations.  Patient received IV digoxin over the weekend  If patient requires additional rate control despite the use of metoprolol, consider oral digoxin  Continue Eliquis.

## 2024-07-05 NOTE — PROGRESS NOTES
Bhavna Al is a 73 y.o. female who is currently ordered Vancomycin IV with management by the Pharmacy Consult service.  Relevant clinical data and objective / subjective history reviewed.  Vancomycin Assessment:  Indication and Goal AUC/Trough: Bacteremia (goal -600, trough >10), -600, trough >10  Clinical Status: improving  Micro: + MRSA Bacteremia  Renal Function:  SCr: 1.21 mg/dL  CrCl: 45.6 mL/min  Renal replacement: Not on dialysis  Days of Therapy: 5  Current Dose: 750mg IV Q24H  Vancomycin Plan:  New Dosing: Continue 750mg IV Q24H  Estimated AUC: 435 mcg*hr/mL  Estimated Trough: 13.3 mcg/mL  Next Level: 7/9 with AM labs  Renal Function Monitoring: Daily BMP and UOP  Pharmacy will continue to follow closely for s/sx of nephrotoxicity, infusion reactions and appropriateness of therapy.  BMP and CBC will be ordered per protocol. We will continue to follow the patient’s culture results and clinical progress daily.    Essie Brown, José LuisD

## 2024-07-05 NOTE — CASE MANAGEMENT
Case Management Discharge Planning Note    Patient name Bhavna Al  Location [facility] /E4 -* MRN [id number]  : 1950 Date 2024       Current Admission Date: 2024  Current Admission Diagnosis:Atrial fibrillation with RVR ([facility])   Patient Active Problem List    Diagnosis Date Noted Date Diagnosed    Atrial fibrillation with RVR ([facility]) 2024     Secondary adrenal insufficiency ([facility]) 2024     Pressure ulcer of left buttock, stage 3 ([facility]) 2024     Acute osteomyelitis of thoracic spine ([facility]) 2024     MRSA bacteremia 2024     Hypotension 2024     Dysphagia 2024     Deep tissue injury 2024     Hypernatremia 2024     Localized swelling on left hand 2023     Stage 3 chronic kidney disease ([facility]) 2023     Febrile illness 2023     Dermatitis associated with incontinence 2023     Right shoulder pain 12/15/2022     Abnormal urinalysis 2022     Ambulatory dysfunction 2022     Hyperuricemia 2022     Acute metabolic encephalopathy 2022     Acute on chronic diastolic heart failure ([facility]) 2022     Acute bronchitis 2022     Assistance needed with transportation 09/15/2022     Abnormal CT of the chest 2022     Gram-positive cocci bacteremia 2022     Psoriasis 2022     Elevated troponin 2022     COVID-19 2022     Paroxysmal atrial fibrillation ([facility]) 01/10/2022     Hyperparathyroidism ([facility]) 2021     Murmur, cardiac 2021     BPPV (benign paroxysmal positional vertigo) 2018     Seasonal allergic rhinitis due to pollen 2018     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     Osteoporosis 2016     SIRS (systemic inflammatory response syndrome) ([facility]) 2016     Leukopenia 2016     Rheumatoid arthritis ([facility]) 2016     GERD (gastroesophageal reflux disease) 2016     Sarcoid 2016     Morbid obesity ([facility]) 2016      Vitamin D deficiency 07/16/2015     Essential hypertension 12/04/2014     Lumbar radiculopathy 12/04/2014       LOS (days): 6  Geometric Mean LOS (GMLOS) (days): 3.9  Days to GMLOS:-2.1     OBJECTIVE:  Risk of Unplanned Readmission Score: 25.76         Current admission status: Inpatient   Preferred Pharmacy:   [id number] - [place], [place] - [address]  [address]  [place] [address]  Phone: [phone] Fax: [phone]    [facility] - [place], [place] - [address]  [address] [address]  Phone: [phone] Fax: [phone]    Primary Care Provider: Nya Taveras DO    Primary Insurance: MEDICARE  Secondary Insurance: [facility]    DISCHARGE DETAILS:    Discharge planning discussed with:: Patient  Freedom of Choice: Yes  Comments - Freedom of Choice: Pt discharging home today -returning to [facility]    Were Treatment Team discharge recommendations reviewed with patient/caregiver?: Yes  Did patient/caregiver verbalize understanding of patient care needs?: Yes  Were patient/caregiver advised of the risks associated with not following Treatment Team discharge recommendations?: Yes    Discharge Destination Plan:: Facility Return, SNF (Long-Term Care)  Transport at Discharge : [facility]  Dispatcher Contacted: Yes  Number/Name of Dispatcher: Roundtrip  Transported by (Company and Unit #): in progress  ETA of Transport (Date): 07/05/24  ETA of Transport (Time): 3555  Transfer Mode: Stretcher  Accompanied by: EMS personnel  Transfer Equipment: Video PassportsS devices    IMM Given (Date):: 07/05/24  Family notified:: Rhoda Al (Dtr) [phone] (H)    Additional Comments: CM updated that Pt is medically stable for discharge. CM confirmed with Pt's care home that Pt is able to return today. Transport scheduled. Family updated. CM following through Pt's discharge.

## 2024-07-05 NOTE — ASSESSMENT & PLAN NOTE
Wt Readings from Last 3 Encounters:   07/05/24 108 kg (238 lb 1.6 oz)   06/18/24 108 kg (238 lb 8.6 oz)   06/08/24 103 kg (228 lb)     Patient has history of diastolic heart failure, echo 6/8/2024 showed ejection fraction of 65%  According to previous hospitalization her home regimen is Lasix 40 mg daily which was held in the setting of AMANDA and reduced p.o. intake.  No diuretic on nursing home paperwork  Initially requiring BiPAP, currently on room air  Chest x-ray with vascular congestion.  Repeat chest x-ray on 7/2/2024 showed increasing density in the right upper lobe with underlying congestive changes and small effusions. This likely represents asymmetric edema though developing pneumonia cannot be entirely excluded.   Cardiologist on board.  Restarted Lasix 20 mg daily  CT chest without contrast showed mild increased intralobular septal thickening, trace fluid in the left oblique fissure, and mild cardiomegaly with small bilateral pleural effusions consistent with heart failure exacerbation/volume overload.   New, more focal area of groundglass opacity/consolidation measuring 2.0 x 0.6 x 1.0 cm in the right upper lobe, representing either sequelae of volume overload or focal infectious/inflammatory process. Recommend follow-up with repeat chest CT in 3 to 4 months to ensure stability/resolution.

## 2024-07-05 NOTE — PROGRESS NOTES
Progress Note - Infectious Disease   hBavna Al 73 y.o. female MRN: [id number]  Unit/Bed#: E4 -01 Encounter: [id number]      Impression/Plan:  1. MRSA bacteremia and thoracic spine osteomyelitis, under active treatment.  Diagnosed on last admission.  Patient had initially been on vancomycin but developed issues with leukopenia and possible renal function issue, which led to transition to daptomycin. She is now back inpatient from facility due to acute hypoxic respiratory failure, with additional AMANDA. PICC was removed. Admission blood cultures are negative >48 hours. She continued Daptomycin but developed increasing CK.  CPK has normalized.  She has been transitioned back to IV vancomycin and appears to be tolerating this antibiotic without difficulty.  Repeat blood cultures are negative  -continue IV vancomycin  -Pharmacy follow-up for vancomycin dose management  -monitor CBCD, creatinine, and vancomycin trough  -Recheck sedimentation rate and CRP  -follow up admission blood cultures  -monitor vitals  -Plan 6 weeks of IV antibiotic treatment through 7/22/2024  -Remove PICC line after last dose of IV antibiotics  -Plan transition to doxycycline until sedimentation rate and CRP have normalized or stabilized.  -Will contact the ID office to arrange follow-up labs and office follow-up.     2. Acute hypoxic respiratory failure.  Suspect that this is related to patient's underlying volume status as she had recently been off diuretics.  Respiratory status is improving again today. This morning her O2 saturation is stable on room air.  -volume management per SLIM  -monitor vitals  -monitor respiratory status     3. Acute metabolic encephalopathy.  Patient very alert and interactive during my visit but is still somewhat confused about having an active infection and needing ongoing treatment.   -antibiotic as above  -recommend aspiration precautions   -serial neuro exams  -supportive care     4. Acute kidney injury. On  chronic kidney disease.  This impacts antibiotic dosing. Upon review of her available medical records it appears her baseline creatinine is approximately 1.1-1.2. Creatinine appears to have peaked at 1.7. This is likely due to volume issues. Also consider role of recent antibiotic use.  Renal function has overall improved and now stabilized.  -monitor creatinine  -dose adjust antibiotic for renal function as needed  -avoid nephrotoxins  -volume management per primary service     5.  Rheumatoid arthritis on immunosuppressive therapy.  Patient chronically on Plaquenil and prednisone. This is risk factor for infection above  -recommend ongoing follow up with rheumatology after discharge      6.  Leukopenia.  Mild and at this point appears to be clinically inconsequential with a neutrophil count well over 1000.  Remained stable.  -Recheck CBC with differential    Discussed with the primary service the plan to continue the vancomycin and they agree with the plan    Infectious disease service will see the patient again 2024 if not discharged.  Please call if questions    Antibiotics:  Vancomycin 5  Antibiotics 28    Subjective:  Patient has no fever, chills, sweats; no nausea, vomiting, diarrhea; no cough, shortness of breath; no pain. No new symptoms.    Objective:  Vitals:  Temp:  [97.4 °F (36.3 °C)-98.7 °F (37.1 °C)] 98.7 °F (37.1 °C)  HR:  [] 85  Resp:  [18] 18  BP: ()/() 145/74  SpO2:  [100 %] 100 %  Temp (24hrs), Av °F (36.7 °C), Min:97.4 °F (36.3 °C), Max:98.7 °F (37.1 °C)  Current: Temperature: 98.7 °F (37.1 °C)    Physical Exam:   General Appearance:  Alert, interactive, nontoxic, no acute distress.   Throat: Oropharynx moist without lesions.    Lungs:   Clear to auscultation bilaterally; no wheezes, rhonchi or rales; respirations unlabored   Heart:  RRR; no murmur, rub or gallop   Abdomen:   Soft, non-tender, non-distended, positive bowel sounds.     Extremities: No clubbing, cyanosis  or edema   Skin: No new rashes or lesions. No draining wounds noted.       Labs, Imaging, & Other studies:   All pertinent labs and imaging studies were personally reviewed  Results from last 7 days   Lab Units 07/05/24 0631 07/04/24 0518 07/03/24 0427   WBC Thousand/uL 3.86* 4.15* 3.83*   HEMOGLOBIN g/dL 8.6* 9.0* 8.8*   PLATELETS Thousands/uL 178 193 167     Results from last 7 days   Lab Units 07/05/24 0631 07/04/24 0518 07/03/24  0427 07/02/24  0529 07/01/24  0528 06/30/24  0547 06/29/24  1450   SODIUM mmol/L 141 140 141   < > 142   < > 140   POTASSIUM mmol/L 3.8 3.4* 3.8   < > 3.5   < > 4.9   CHLORIDE mmol/L 106 107 108   < > 108   < > 106   CO2 mmol/L 29 27 26   < > 28   < > 22   BUN mg/dL 15 15 13   < > 13   < > 10   CREATININE mg/dL 1.21 1.32* 1.21   < > 1.47*   < > 1.28   EGFR ml/min/1.73sq m 43 39 43   < > 34   < > 40   CALCIUM mg/dL 8.3* 8.4 8.4   < > 8.5   < > 8.9   AST U/L  --   --   --   --  48*  --  60*   ALT U/L  --   --   --   --  28  --  31   ALK PHOS U/L  --   --   --   --  39  --  60    < > = values in this interval not displayed.     Results from last 7 days   Lab Units 06/29/24  1450 06/29/24  1449   BLOOD CULTURE  No Growth After 5 Days. No Growth After 5 Days.     Results from last 7 days   Lab Units 06/29/24  1450   PROCALCITONIN ng/ml 0.23

## 2024-07-08 ENCOUNTER — TELEPHONE (OUTPATIENT)
Dept: NEUROSURGERY | Facility: CLINIC | Age: 74
End: 2024-07-08

## 2024-07-08 ENCOUNTER — TRANSITIONAL CARE MANAGEMENT (OUTPATIENT)
Dept: INTERNAL MEDICINE CLINIC | Facility: CLINIC | Age: 74
End: 2024-07-08

## 2024-07-08 ENCOUNTER — TELEPHONE (OUTPATIENT)
Dept: INFECTIOUS DISEASES | Facility: CLINIC | Age: 74
End: 2024-07-08

## 2024-07-08 DIAGNOSIS — R78.81 MRSA BACTEREMIA: Primary | ICD-10-CM

## 2024-07-08 DIAGNOSIS — B95.62 MRSA BACTEREMIA: Primary | ICD-10-CM

## 2024-07-08 DIAGNOSIS — M46.24 ACUTE OSTEOMYELITIS OF THORACIC SPINE (HCC): ICD-10-CM

## 2024-07-08 NOTE — TELEPHONE ENCOUNTER
Contacted [place].  Patient on second floor.   confirmed receipt of fax with abx plan and orders.  Unable to reach, as call continues to be dropped.    Will call back to confirm receipt, and that patient is scheduled for repeat lab work tomorrow.

## 2024-07-08 NOTE — TELEPHONE ENCOUNTER
Ricarda called in after trying to reach Melissa a few times and being unsuccessful.   Please call back [phone] to go over x-rays that were completed.     Thank you.

## 2024-07-08 NOTE — TELEPHONE ENCOUNTER
Called and s malik to Ricarda on the 3rd floor nursing unit.     Reminded her of xrays that need to be completed   Before appointment tomorrow. Ricarda states she will look into it and call back gave direct number    Waiting for call back for confirmation

## 2024-07-08 NOTE — TELEPHONE ENCOUNTER
Contacted [facility], no answer.  Spoke with Vickie at  who gives me the supervisors direct number. ([phone])    Contacted supervisor, however no answer and call was dropped.

## 2024-07-08 NOTE — PROGRESS NOTES
OPAT NOTE    Supervising/Discharge physician: Tadeo    Diagnosis: MRSA Bacteremia, T Spine OM    Drug: Vancomycin    Dose/Route/Frequency: 750 MG IV Q24    Labs/Frequency: CBCD BMP Vanco trough CRP ESR weekly    End Date: 7/22    Vanco Trough Range: 11.9-17.9    Infusion/VNA contact: [facility]     Next appointment:   7/11      MA assigned:  Naima

## 2024-07-09 NOTE — TELEPHONE ENCOUNTER
Contacted 2nd floor nurse station.  Call was dropped.  Called again, spoke with Niranjan at . Explained to her that I have not been able to get anyone and that I need to get someone. She states that she will send me to the supervisor.    Spoke with Ricarda who confirms all orders had been received. She states lab is coming in today to get the blood, and she will have her dose of vanco afterwards. Patient scheduled for vancomycin dose. She states that the supervisor phone should be used [phone] if we cannot get nursing on the line.  Patient gets her vanco at 2 PM daily.  She confirms all orders as received.

## 2024-07-10 ENCOUNTER — TELEPHONE (OUTPATIENT)
Dept: INFECTIOUS DISEASES | Facility: CLINIC | Age: 74
End: 2024-07-10

## 2024-07-10 NOTE — TELEPHONE ENCOUNTER
Vanco Dose:  750mg Q24  Dose Time (s): 2p  Missed doses?:n  Was trough drawn prior to dose?:y    Cre: 1.27  Vanco Trough:18  Range 11.9-17.9  End Date: 7/22      SNF: [facility]    Provider: Tadeo

## 2024-07-10 NOTE — ASSESSMENT & PLAN NOTE
Mild and at this point appears to be clinically inconsequential with a neutrophil count well over 1000.  Remained stable.  -Recheck CBC with differential

## 2024-07-10 NOTE — ASSESSMENT & PLAN NOTE
With complicating thoracic spine osteomyelitis. Patient had initially been on vancomycin but developed issues with leukopenia and possible renal function issue, which led to transition to daptomycin. She continued Daptomycin but developed increasing CK.  CPK has normalized with discontinuation of the daptomycin.  She has been transitioned back to IV vancomycin and appears to be tolerating this antibiotic without difficulty.  Repeat blood cultures are negative.  Vancomycin trough therapeutic  -continue IV vancomycin through 7/22/2024  -Recheck CBCD, BMP, and vancomycin trough to make sure no developing toxicity or treatment failure  -Recheck sedimentation rate and CRP monthly while on the antibiotics  -Remove PICC line after last dose of IV antibiotics  -Changed to doxycycline 100 mg p.o. every 12 hours on 7/23/2024 and this will be continued until sedimentation rate normalizes or stabilizes  -Follow-up in 6 weeks

## 2024-07-10 NOTE — ASSESSMENT & PLAN NOTE
on immunosuppressive therapy.  Patient chronically on Plaquenil and prednisone. This is risk factor for infection above  -recommend ongoing follow up with rheumatology after discharge

## 2024-07-10 NOTE — TELEPHONE ENCOUNTER
Attempt to call [facility], transferred to nurse station. No answer. Call dropped.    Contacted supervisor phone.  No answer. Call dropped.    Contacted [facility]. Spoke with Ana Rosa.  Megan Golden is the ordering physician at the facility on the patient's orders.  She will fax the results.

## 2024-07-10 NOTE — ASSESSMENT & PLAN NOTE
Renal function remains relatively stable after recent admission for acute kidney injury.  -Recheck BMP as above  -dose adjust antibiotic for renal function as needed  -avoid nephrotoxins      Lab Results   Component Value Date    EGFR 43 07/05/2024    EGFR 39 07/04/2024    EGFR 43 07/03/2024    CREATININE 1.21 07/05/2024    CREATININE 1.32 (H) 07/04/2024    CREATININE 1.21 07/03/2024

## 2024-07-10 NOTE — ASSESSMENT & PLAN NOTE
Patient very alert and interactive during my visit but is still somewhat confused about having an active infection and needing ongoing treatment.   -antibiotic as above  -Aspiration precautions  -serial neuro exams  -supportive care

## 2024-07-10 NOTE — PROGRESS NOTES
Discharge information reviewed in detail. Pt verbalized understanding of discharge instructions to follow up with new PCP and to return to ER if condition worsens. Questions about new PCP scheduled appointment addressed.  Pt waiting in ER for wife discharge, has steady gait.    Progress Note - Infectious Disease   Bhavna Al 73 y.o. female MRN: [id number]  Unit/Bed#:  Encounter: [id number]      Impression/Plan:  MRSA bacteremia  With complicating thoracic spine osteomyelitis. Patient had initially been on vancomycin but developed issues with leukopenia and possible renal function issue, which led to transition to daptomycin. She continued Daptomycin but developed increasing CK.  CPK has normalized with discontinuation of the daptomycin.  She has been transitioned back to IV vancomycin and appears to be tolerating this antibiotic without difficulty.  Repeat blood cultures are negative.  Vancomycin trough therapeutic  -continue IV vancomycin through 7/22/2024  -Recheck CBCD, BMP, and vancomycin trough to make sure no developing toxicity or treatment failure  -Recheck sedimentation rate and CRP monthly while on the antibiotics  -Remove PICC line after last dose of IV antibiotics  -Changed to doxycycline 100 mg p.o. every 12 hours on 7/23/2024 and this will be continued until sedimentation rate normalizes or stabilizes  -Follow-up in 6 weeks    Acute metabolic encephalopathy  Patient very alert and interactive during my visit but is still somewhat confused about having an active infection and needing ongoing treatment.   -antibiotic as above  -Aspiration precautions  -serial neuro exams  -supportive care    Stage 3 chronic kidney disease (HCC)  Renal function remains relatively stable after recent admission for acute kidney injury.  -Recheck BMP as above  -dose adjust antibiotic for renal function as needed  -avoid nephrotoxins      Lab Results   Component Value Date    EGFR 43 07/05/2024    EGFR 39 07/04/2024    EGFR 43 07/03/2024    CREATININE 1.21 07/05/2024    CREATININE 1.32 (H) 07/04/2024    CREATININE 1.21 07/03/2024       Rheumatoid arthritis (HCC)  on immunosuppressive therapy.  Patient chronically on Plaquenil and prednisone. This is risk factor for infection above  -recommend  "ongoing follow up with rheumatology after discharge     Leukopenia  Mild and at this point appears to be clinically inconsequential with a neutrophil count well over 1000.  Remained stable.  -Recheck CBC with differential    Psoriasis   Biopsy proven during recent admission.  -Dermatology follow-up    Antibiotics:  Vancomycin 11  Antibiotics 34    Subjective:  Here for follow-up for MRSA bacteremia and spine infection.  Patient here for follow-up after repeat admission, this time for respiratory failure and acute kidney injury which resolved.  Patient's antibiotics were changed from daptomycin back over to vancomycin with a rising CPK.  Patient has no fever, chills, sweats; no nausea, vomiting, diarrhea; no cough, shortness of breath; no pain. No new symptoms.  She feels well overall.    ROS:  A complete review of systems is negative other than that noted above in the subjective    Followup portions patient history reviewed and updated as:  Allergies, current medications, past medical history, past social history, past surgical history, and the problem list    Follow-up with infectious diseases in 6 weeks.    Objective:  Vitals:  Vitals:    07/11/24 1544   BP: 138/72   Pulse: 68   Resp: 18   Temp: 98.2 °F (36.8 °C)   SpO2: 97%   Weight: 108 kg (238 lb)   Height: 5' 1\" (1.549 m)       Physical Exam:   General Appearance:  Alert, interactive, appearing well, nontoxic, no acute distress.   Throat: Oropharynx moist without lesions.    Lungs:   Clear to auscultation bilaterally; no audible wheezes, rhonchi or rales; respirations unlabored   Heart:  RRR; no murmur, rub or gallop   Abdomen:   Soft, non-tender, non-distended, positive bowel sounds.     Extremities: No clubbing, cyanosis or edema   Skin: Stable scaling rash.       Labs, Imaging, & Other studies:   All pertinent labs and imaging studies were personally reviewed    Lab Results   Component Value Date    K 3.8 07/05/2024     07/05/2024    CO2 29 07/05/2024 "    BUN 15 07/05/2024    CREATININE 1.21 07/05/2024    GLUCOSE 123 11/21/2022    GLUF 89 11/20/2022    CALCIUM 8.3 (L) 07/05/2024    CORRECTEDCA 9.7 07/01/2024    AST 48 (H) 07/01/2024    ALT 28 07/01/2024    ALKPHOS 39 07/01/2024    EGFR 43 07/05/2024     Lab Results   Component Value Date    WBC 3.86 (L) 07/05/2024    HGB 8.6 (L) 07/05/2024    HCT 27.9 (L) 07/05/2024    MCV 91 07/05/2024     07/05/2024     Cre: 1.27  Vanco Trough:18

## 2024-07-11 ENCOUNTER — OFFICE VISIT (OUTPATIENT)
Dept: INFECTIOUS DISEASES | Facility: CLINIC | Age: 74
End: 2024-07-11
Payer: MEDICARE

## 2024-07-11 VITALS
RESPIRATION RATE: 18 BRPM | OXYGEN SATURATION: 97 % | BODY MASS INDEX: 44.93 KG/M2 | WEIGHT: 238 LBS | DIASTOLIC BLOOD PRESSURE: 72 MMHG | HEART RATE: 68 BPM | TEMPERATURE: 98.2 F | SYSTOLIC BLOOD PRESSURE: 138 MMHG | HEIGHT: 61 IN

## 2024-07-11 DIAGNOSIS — N18.30 STAGE 3 CHRONIC KIDNEY DISEASE, UNSPECIFIED WHETHER STAGE 3A OR 3B CKD (HCC): ICD-10-CM

## 2024-07-11 DIAGNOSIS — B95.62 MRSA BACTEREMIA: Primary | ICD-10-CM

## 2024-07-11 DIAGNOSIS — R78.81 MRSA BACTEREMIA: Primary | ICD-10-CM

## 2024-07-11 DIAGNOSIS — L40.9 PSORIASIS: ICD-10-CM

## 2024-07-11 DIAGNOSIS — G93.41 ACUTE METABOLIC ENCEPHALOPATHY: ICD-10-CM

## 2024-07-11 DIAGNOSIS — D72.819 LEUKOPENIA: ICD-10-CM

## 2024-07-11 DIAGNOSIS — M06.9 RHEUMATOID ARTHRITIS INVOLVING MULTIPLE SITES, UNSPECIFIED WHETHER RHEUMATOID FACTOR PRESENT (HCC): Chronic | ICD-10-CM

## 2024-07-11 PROCEDURE — 99215 OFFICE O/P EST HI 40 MIN: CPT | Performed by: INTERNAL MEDICINE

## 2024-07-11 NOTE — PATIENT INSTRUCTIONS
-continue IV vancomycin through 7/22/2024  -Recheck CBCD, BMP, and vancomycin trough weekly to make sure no developing toxicity or treatment failure- due next 7/16.  -Remove PICC line after last dose of IV antibiotics  -Changed to doxycycline 100 mg p.o. every 12 hours on 7/23/2024 and this will be continued until sedimentation rate normalizes or stabilizes  -Follow-up in 6 weeks, with blood work due one week prior.  -Call in the interim with questions or concerns.

## 2024-07-15 NOTE — ASSESSMENT & PLAN NOTE
Presumed osteomyelitis at T9-10 in the setting of gram-positive bacteremia  Initially presented on 6/6/2024 with lethargy, poor oral intake  IV daptomycin changed to IV Vanco due to rising CPK through 7/22 per ID then starting PO Doxy until inflammatory markers stabilize/normalize  Bracing deferred due to bedbound status, body habitus and underlying skin condition.  Patient reports that her pain is improved from hospitalization.  It is in her low back, not mid back. Denies radiating pain.  She is bedbound at baseline and uses a bedpan.  Exam: Diffuse weakness in BLE, unable to palpate entire spine due to positioning on stretcher.    Imaging:  XR thoracic 7/17/24: Limited examination without discrete visualization of previously suspected T9-10 discitis osteomyelitis complex.   XR thoracic 6/10/24: Significantly limited exam with extremely poor visualization of the thoracic spine on lateral views due to soft tissue attenuation factors   MRI thoracic spine without contrast 6/8/2024: Findings suspicious for discitis/osteomyelitis at T9-10.  No epidural collection.  Scoliosis and degenerative changes.    Plan:  Reviewed imaging with patient and niece.  Grossly stable appearance to upright XR, though difficulty with visualization.   No neurosurgical intervention recommended.  Continue conservative management.  Antibiotics per ID  Advised to let us know if symptoms change or worsen, and to seek medical attention  Follow-up in 6-8 weeks with upright XR to see AP after ID appointment.  Call sooner with any questions or concerns.

## 2024-07-17 ENCOUNTER — HOSPITAL ENCOUNTER (OUTPATIENT)
Dept: RADIOLOGY | Facility: HOSPITAL | Age: 74
Discharge: HOME | End: 2024-07-17
Payer: MEDICARE

## 2024-07-17 ENCOUNTER — OFFICE VISIT (OUTPATIENT)
Dept: NEUROSURGERY | Facility: CLINIC | Age: 74
End: 2024-07-17
Payer: MEDICARE

## 2024-07-17 VITALS
BODY MASS INDEX: 44.97 KG/M2 | SYSTOLIC BLOOD PRESSURE: 110 MMHG | DIASTOLIC BLOOD PRESSURE: 70 MMHG | HEIGHT: 61 IN | OXYGEN SATURATION: 100 % | TEMPERATURE: 98.6 F | RESPIRATION RATE: 20 BRPM

## 2024-07-17 DIAGNOSIS — M46.24 ACUTE OSTEOMYELITIS OF THORACIC SPINE (HCC): ICD-10-CM

## 2024-07-17 DIAGNOSIS — M46.24 ACUTE OSTEOMYELITIS OF THORACIC SPINE (HCC): Primary | ICD-10-CM

## 2024-07-17 PROCEDURE — 99214 OFFICE O/P EST MOD 30 MIN: CPT | Performed by: PHYSICIAN ASSISTANT

## 2024-07-17 PROCEDURE — 72072 X-RAY EXAM THORAC SPINE 3VWS: CPT

## 2024-07-17 NOTE — PROGRESS NOTES
Neurosurgery Office Note  Bhavna Al 73 y.o. female MRN: [id number]      Assessment & Plan     Acute osteomyelitis of thoracic spine (HCC)  Presumed osteomyelitis at T9-10 in the setting of gram-positive bacteremia  Initially presented on 6/6/2024 with lethargy, poor oral intake  IV daptomycin changed to IV Vanco due to rising CPK through 7/22 per ID then starting PO Doxy until inflammatory markers stabilize/normalize  Bracing deferred due to bedbound status, body habitus and underlying skin condition.  Patient reports that her pain is improved from hospitalization.  It is in her low back, not mid back. Denies radiating pain.  She is bedbound at baseline and uses a bedpan.  Exam: Diffuse weakness in BLE, unable to palpate entire spine due to positioning on stretcher.    Imaging:  XR thoracic 7/17/24: Limited examination without discrete visualization of previously suspected T9-10 discitis osteomyelitis complex.   XR thoracic 6/10/24: Significantly limited exam with extremely poor visualization of the thoracic spine on lateral views due to soft tissue attenuation factors   MRI thoracic spine without contrast 6/8/2024: Findings suspicious for discitis/osteomyelitis at T9-10.  No epidural collection.  Scoliosis and degenerative changes.    Plan:  Reviewed imaging with patient and niece.  Grossly stable appearance to upright XR, though difficulty with visualization.   No neurosurgical intervention recommended.  Continue conservative management.  Antibiotics per ID  Advised to let us know if symptoms change or worsen, and to seek medical attention  Follow-up in 6-8 weeks with upright XR to see AP after ID appointment.  Call sooner with any questions or concerns.     Diagnoses and all orders for this visit:    Acute osteomyelitis of thoracic spine (HCC)  -     XR spine thoracic 3 vw; Future          I have spent a total time of 35 minutes on 07/17/24 in caring for this patient including Diagnostic results, Risks and  benefits of tx options, Instructions for management, Patient and family education, Importance of tx compliance, Counseling / Coordination of care, Documenting in the medical record, Reviewing / ordering tests, medicine, procedures  , and Obtaining or reviewing history  .      CHIEF COMPLAINT    Chief Complaint   Patient presents with    Follow-up     4 WK HFU W/ AP W/ thoracic spine x-ray - PER BARRIE       HISTORY    History of Present Illness     73 y.o. year old female     73-year-old female seen for hospital follow-up of T9-10 osteomyelitis in the setting of gram-positive bacteremia.  She presented to the hospital 6/6/2024 with lethargy and poor oral intake.  Currently on IV Vanco through 722 per ID, then starting p.o. doxycycline until inflammatory markers stabilized/normalized.  Patient is bedbound at baseline.  Patient reports pain in her low back.  The pain she was having prior to and during her hospitalization has improved, where pain is normally around 4/10, but if she is not repositioned, the pain can be as bad as 8-9/10.  Denies radiating pain.  No BBI, uses a bedpan.  Bracing deferred due to underlying desquamating skin condition and bedbound status.        See Discussion    REVIEW OF SYSTEMS    Review of Systems   Constitutional: Negative.    HENT: Negative.     Eyes: Negative.    Respiratory:  Positive for shortness of breath.    Cardiovascular: Negative.    Gastrointestinal: Negative.    Genitourinary: Negative.    Musculoskeletal:  Positive for back pain (across the lower back) and gait problem (bed bound now).   Allergic/Immunologic: Negative.    Neurological:  Positive for weakness (R leg). Negative for tremors and numbness.   Psychiatric/Behavioral:  Positive for sleep disturbance.        ROS obtained by MA. Reviewed. See HPI.     Meds/Allergies     Current Outpatient Medications   Medication Sig Dispense Refill    acetaminophen (TYLENOL) 325 mg tablet Take 2 tablets (650 mg total) by mouth every 4  (four) hours as needed for mild pain, headaches or fever. 30 tablet 0    albuterol (Ventolin HFA) 90 mcg/act inhaler Inhale 2 puffs every 6 (six) hours as needed for wheezing 18 g 0    alendronate (FOSAMAX) 70 mg tablet Take by mouth every 7 days       ammonium lactate (LAC-HYDRIN) 12 % cream Apply topically 2 (two) times a day      apixaban (ELIQUIS) 5 mg Take 1 tablet (5 mg total) by mouth 2 (two) times a day 60 tablet 0    cholecalciferol (VITAMIN D3) 1,000 units tablet Take 1 tablet (1,000 Units total) by mouth daily 90 tablet 0    clobetasol (TEMOVATE) 0.05 % cream Apply topically 2 (two) times a day 60 g 0    furosemide (LASIX) 20 mg tablet Take 1 tablet (20 mg total) by mouth daily      lidocaine (LIDODERM) 5 % Apply 2 patches topically daily Remove & Discard patch within 12 hours or as directed by MD Do not start before December 20, 2022.  0    metoprolol tartrate (LOPRESSOR) 25 mg tablet Take 1 tablet (25 mg total) by mouth every 12 (twelve) hours 180 tablet 0    miconazole 2 % cream Apply topically 2 (two) times a day      nystatin (MYCOSTATIN) powder Apply topically 2 (two) times a day 15 g 0    pantoprazole (PROTONIX) 40 mg tablet Take 1 tablet (40 mg total) by mouth daily in the early morning      predniSONE 5 mg tablet Take 1 tablet (5 mg total) by mouth 2 (two) times a day with meals Do not start before June 5, 2023. 60 tablet 0    triamcinolone (KENALOG) 0.1 % cream Apply topically 2 (two) times a day Apply to BUE and BLE topically everyday and evening shift for psoriasis      Zinc 50 MG TABS Take 1 tablet by mouth in the morning      bisacodyl (FLEET) 10 MG/30ML ENEM Insert 10 mg into the rectum once (Patient not taking: Reported on 7/17/2024)      hydroxychloroquine (PLAQUENIL) 200 mg tablet Take 1 tablet (200 mg total) by mouth 2 (two) times a day 60 tablet 0    vancomycin (VANCOCIN) Inject 150 mL (750 mg total) into a catheter in a vein over 60 minutes at 150 mL/hr every 24 hours for 19 days  (Patient not taking: Reported on 2024) 2850 mL 0     No current facility-administered medications for this visit.       Allergies   Allergen Reactions    Shellfish-Derived Products - Food Allergy Itching    Methotrexate Derivatives     Amoxicillin Rash    Ampicillin-Sulbactam Sodium Rash       PAST HISTORY    Past Medical History:   Diagnosis Date    Abnormal thyroid function test     last assessed: 2015     Arthritis     Caries     last assessed: 2016     Continuous opioid dependence (HCC) 2021    Edema of right lower extremity     last assessed: 2015     GERD (gastroesophageal reflux disease)     Hypertension     Medicare annual wellness visit, subsequent 2021    Positive blood culture 3/11/2022    Sarcoid        Past Surgical History:   Procedure Laterality Date     SECTION      IR NON-TUNNELED CENTRAL LINE PLACEMENT  2024    IR PICC PLACEMENT SINGLE LUMEN  2024    IR PICC PLACEMENT SINGLE LUMEN  2024    MULTIPLE TOOTH EXTRACTIONS N/A 2016    Procedure: Surgical extraction of teeth 2, 18, 19, 30, 31; incision and drainage of left subperiosteal abscess ;  Surgeon: Clara Cevallos DMD;  Location: [facility];  Service:        Social History     Tobacco Use    Smoking status: Never    Smokeless tobacco: Never   Vaping Use    Vaping status: Never Used   Substance Use Topics    Alcohol use: Not Currently    Drug use: No       Family History   Problem Relation Age of Onset    Asthma Mother     Stroke Mother     No Known Problems Father     No Known Problems Sister     No Known Problems Brother     No Known Problems Maternal Grandmother     No Known Problems Maternal Grandfather     No Known Problems Paternal Grandmother     No Known Problems Paternal Grandfather     Diabetes Maternal Aunt     Breast cancer Cousin     Diabetes Cousin     Breast cancer Other          Above history personally reviewed.       EXAM    Vitals:Blood pressure 110/70,  "temperature 98.6 °F (37 °C), temperature source Temporal, resp. rate 20, height 5' 1\" (1.549 m), SpO2 100%.,Body mass index is 44.97 kg/m².     Physical Exam  Vitals reviewed.   Constitutional:       General: She is awake.      Appearance: Normal appearance.   HENT:      Head: Normocephalic and atraumatic.   Eyes:      Conjunctiva/sclera: Conjunctivae normal.   Cardiovascular:      Rate and Rhythm: Normal rate.   Pulmonary:      Effort: Pulmonary effort is normal.   Musculoskeletal:      Comments: Patient sitting awkwardly on stretcher, unable to evaluate entire back due to positioning and body habitus.  Tenderness in upper thoracic spine   Skin:     General: Skin is warm and dry.   Neurological:      Mental Status: She is alert and oriented to person, place, and time.   Psychiatric:         Attention and Perception: Attention and perception normal.         Mood and Affect: Mood and affect normal.         Speech: Speech normal.         Behavior: Behavior normal. Behavior is cooperative.         Thought Content: Thought content normal.         Cognition and Memory: Cognition and memory normal.         Judgment: Judgment normal.         Neurologic Exam     Mental Status   Oriented to person, place, and time.   Follows 2 step commands.   Attention: normal. Concentration: normal.   Speech: speech is normal   Level of consciousness: alert  Knowledge: good.   Normal comprehension.     Motor Exam   Muscle bulk: normal  Overall muscle tone: normal  Diffusely weak, sitting on stretcher  Right DF 4-/5, PF 4/5  Left DF/PF 4/5     Gait, Coordination, and Reflexes     Tremor   Resting tremor: absent  Intention tremor: absent  Action tremor: absent  Bed bound          MEDICAL DECISION MAKING    Imaging Studies:     CT chest wo contrast    Result Date: 7/3/2024  Narrative: CT CHEST WITHOUT IV CONTRAST INDICATION: Congestive heart failure. COMPARISON: CT chest/abdomen/pelvis 6/6/2024 and multiple recent chest radiographs. " TECHNIQUE: CT examination of the chest was performed without intravenous contrast. Multiplanar 2D reformatted images were created from the source data. This examination, like all CT scans performed in the [facility], was performed utilizing techniques to minimize radiation dose exposure, including the use of iterative reconstruction and automated exposure control. Radiation dose length product (DLP) for this visit: 706 mGy-cm FINDINGS: LUNGS: No new or suspicious pulmonary nodules. Mild increased thickening of the interlobular septa with a trace amount of fluid in the left oblique fissure. In the right upper lobe there is a new, more focal area of groundglass opacity/consolidation measuring roughly 2.0 x 0.6 x 1.0 cm (3:51 and 601:75), could represent sequelae of volume overload or focal inflammatory/infectious process. Bibasilar atelectasis and mild diffuse bronchial wall thickening in the posterior lower lobes, likely reactive due to adjacent pleural effusions. Central airways are otherwise patent. PLEURA: Small bilateral pleural effusions, slightly progressed from 6/6/2024. HEART/GREAT VESSELS: Left sided PICC line and right IJ central venous catheters with tips in the superior cavoatrial junction. Atherosclerotic calcifications of the aortic arch and great vessels. Mild coronary artery calcifications. Dense mitral valvular  calcifications. Mild cardiomegaly. No pericardial effusion. No thoracic aortic aneurysm. MEDIASTINUM AND MICHAEL: No mediastinal or hilar lymphadenopathy. CHEST WALL AND LOWER NECK: Unremarkable. VISUALIZED STRUCTURES IN THE UPPER ABDOMEN: Unremarkable. OSSEOUS STRUCTURES: Spinal degenerative changes are noted. No acute fracture or destructive osseous lesion. Old, healed lateral right eighth rib fracture.     Impression: 1. Mild increased intralobular septal thickening, trace fluid in the left oblique fissure, and mild cardiomegaly with small bilateral pleural effusions  consistent with heart failure exacerbation/volume overload. 2. New, more focal area of groundglass opacity/consolidation measuring 2.0 x 0.6 x 1.0 cm in the right upper lobe, representing either sequelae of volume overload or focal infectious/inflammatory process. Recommend follow-up with repeat chest CT in 3 to 4 months to ensure stability/resolution. Resident: REDD AHN I, the attending radiologist, have reviewed the images and agree with the final report above. Workstation performed: [id number]     XR chest PICC line portable    Result Date: 7/3/2024  Narrative: XR CHEST PICC LINE PORTABLE INDICATION: left picc insertion. COMPARISON: July 1, 2024 FINDINGS: Left PICC tip projects at the level of the SVC just above the caval atrial junction. Right IJ central catheter has tip projecting in the upper portion of the right atrium. Cardiac monitor leads on the chest. Some other scattered artifacts are noted. Limited inspiration. Platelike atelectasis noted bilaterally. Low lung volumes. No pneumothorax or pleural effusion. Normal cardiomediastinal silhouette. There is some arthritis of the acromioclavicular joints Normal upper abdomen.     Impression: Left PICC has the tip projecting in the distal portion of the SVC just above the caval atrial junction in satisfactory position. Right IJ line has the tip in the upper portion of the right atrium, stable from prior. Bilateral platelike atelectasis with low lung volumes Workstation performed: [id number]     XR chest portable    Result Date: 7/2/2024  Narrative: XR CHEST PORTABLE INDICATION: Shortness of breath. COMPARISON: 6/29/2024 FINDINGS: Unchanged lines and tubes. There is vascular congestion with small bilateral pleural effusions, similar to the prior study. There is mildly increased density in the right upper lobe. There is no pneumothorax. Normal cardiomediastinal silhouette. Bones are unremarkable for age. Normal upper abdomen.     Impression: Increasing  density in the right upper lobe with underlying congestive changes and small effusions. This likely represents asymmetric edema though developing pneumonia cannot be entirely excluded. The study was marked in EPIC for immediate notification. Workstation performed: [id number]     XR chest 1 view portable    Result Date: 6/30/2024  Narrative: XR CHEST PORTABLE INDICATION: post line placement. COMPARISON: Compared with 6/29/2024 FINDINGS: Right neck catheter in the cavoatrial region. Poor inspiratory effort Prominent interstitial markings. Linear scarring within the right upper lung. No pneumothorax or pleural effusion. Normal cardiomediastinal silhouette. Bones are unremarkable for age. Normal upper abdomen.     Impression: Mild to moderate congestive changes. Workstation performed: [id number]     XR chest portable    Result Date: 6/29/2024  Narrative: XR CHEST PORTABLE INDICATION: respiratory distress. COMPARISON: 6/17/2024 FINDINGS: No PICC line is identified on the current exam. Linear atelectasis is present in the right upper lobe. No pneumothorax or pleural effusion. Normal cardiomediastinal silhouette. Bones are unremarkable for age. Normal upper abdomen.     Impression: No PICC line identified. Workstation performed: [id number]     IR PICC placement single lumen    Result Date: 6/17/2024  Narrative: Peripherally inserted central venous catheter Clinical History: IV access. Fluoroscopy time: 0.2 minutes. Findings: A fluoroscopic spot view of the chest was obtained demonstrating a 4 [place] single lumen PICC terminating at the RA/SVC junction. The procedure was performed by the interventional radiology staff.  All elements of maximal sterile barrier technique, cap and mask and sterile gown and sterile gloves and sterile full-body drape and hand hygiene and 2% chlorhexidine for cutaneous antisepsis.  Sterile ultrasound technique with sterile gel and sterile probe covers was also utilized.     Impression:  Impression: PICC centrally positioned. Workstation performed: [id number]       I have personally reviewed pertinent reports.   and I have personally reviewed pertinent films in PACS

## 2024-07-21 ENCOUNTER — APPOINTMENT (EMERGENCY)
Dept: RADIOLOGY | Facility: HOSPITAL | Age: 74
DRG: 871 | End: 2024-07-21
Payer: MEDICARE

## 2024-07-21 ENCOUNTER — HOSPITAL ENCOUNTER (INPATIENT)
Facility: HOSPITAL | Age: 74
LOS: 12 days | Discharge: NURSING, REHAB OR LONG-TERM CARE | DRG: 871 | End: 2024-08-02
Attending: EMERGENCY MEDICINE | Admitting: INTERNAL MEDICINE
Payer: MEDICARE

## 2024-07-21 ENCOUNTER — APPOINTMENT (INPATIENT)
Dept: CT IMAGING | Facility: HOSPITAL | Age: 74
DRG: 871 | End: 2024-07-21
Payer: MEDICARE

## 2024-07-21 ENCOUNTER — APPOINTMENT (INPATIENT)
Dept: RADIOLOGY | Facility: HOSPITAL | Age: 74
DRG: 871 | End: 2024-07-21
Payer: MEDICARE

## 2024-07-21 DIAGNOSIS — D69.6 THROMBOCYTOPENIA (HCC): ICD-10-CM

## 2024-07-21 DIAGNOSIS — N17.9 AKI (ACUTE KIDNEY INJURY) (HCC): ICD-10-CM

## 2024-07-21 DIAGNOSIS — D86.9 SARCOID: Chronic | ICD-10-CM

## 2024-07-21 DIAGNOSIS — A41.9 SEPTIC SHOCK (HCC): ICD-10-CM

## 2024-07-21 DIAGNOSIS — R78.81 MRSA BACTEREMIA: ICD-10-CM

## 2024-07-21 DIAGNOSIS — I50.33 ACUTE ON CHRONIC DIASTOLIC HEART FAILURE (HCC): ICD-10-CM

## 2024-07-21 DIAGNOSIS — E83.42 HYPOMAGNESEMIA: ICD-10-CM

## 2024-07-21 DIAGNOSIS — Z71.89 GOALS OF CARE, COUNSELING/DISCUSSION: ICD-10-CM

## 2024-07-21 DIAGNOSIS — R65.21 SEPTIC SHOCK (HCC): ICD-10-CM

## 2024-07-21 DIAGNOSIS — I48.91 ATRIAL FIBRILLATION WITH RVR (HCC): ICD-10-CM

## 2024-07-21 DIAGNOSIS — E66.01 MORBID OBESITY (HCC): ICD-10-CM

## 2024-07-21 DIAGNOSIS — I95.9 HYPOTENSION: ICD-10-CM

## 2024-07-21 DIAGNOSIS — R57.9 SHOCK (HCC): ICD-10-CM

## 2024-07-21 DIAGNOSIS — M86.9 OSTEOMYELITIS (HCC): ICD-10-CM

## 2024-07-21 DIAGNOSIS — R41.89 UNRESPONSIVE: Primary | ICD-10-CM

## 2024-07-21 DIAGNOSIS — R79.89 ELEVATED TROPONIN: ICD-10-CM

## 2024-07-21 DIAGNOSIS — B95.62 MRSA BACTEREMIA: ICD-10-CM

## 2024-07-21 PROBLEM — L29.8 CHRONIC PRURITIC RASH IN ADULT: Status: ACTIVE | Noted: 2024-07-21

## 2024-07-21 PROBLEM — E87.6 HYPOKALEMIA: Status: ACTIVE | Noted: 2024-07-21

## 2024-07-21 LAB
2HR DELTA HS TROPONIN: -135 NG/L
ALBUMIN SERPL BCG-MCNC: 2.5 G/DL (ref 3.5–5)
ALP SERPL-CCNC: 46 U/L (ref 34–104)
ALT SERPL W P-5'-P-CCNC: 23 U/L (ref 7–52)
ANION GAP SERPL CALCULATED.3IONS-SCNC: 16 MMOL/L (ref 4–13)
ANION GAP SERPL CALCULATED.3IONS-SCNC: 18 MMOL/L (ref 4–13)
APTT PPP: 37 SECONDS (ref 23–37)
APTT PPP: >210 SECONDS (ref 23–37)
AST SERPL W P-5'-P-CCNC: 27 U/L (ref 13–39)
ATRIAL RATE: 326 BPM
ATRIAL RATE: 535 BPM
BASOPHILS # BLD AUTO: 0.05 THOUSANDS/ÂΜL (ref 0–0.1)
BASOPHILS NFR BLD AUTO: 1 % (ref 0–1)
BILIRUB SERPL-MCNC: 0.6 MG/DL (ref 0.2–1)
BUN SERPL-MCNC: 24 MG/DL (ref 5–25)
BUN SERPL-MCNC: 25 MG/DL (ref 5–25)
CALCIUM ALBUM COR SERPL-MCNC: 9.9 MG/DL (ref 8.3–10.1)
CALCIUM SERPL-MCNC: 8.7 MG/DL (ref 8.4–10.2)
CALCIUM SERPL-MCNC: 8.7 MG/DL (ref 8.4–10.2)
CARDIAC TROPONIN I PNL SERPL HS: 1239 NG/L
CARDIAC TROPONIN I PNL SERPL HS: 1374 NG/L
CHLORIDE SERPL-SCNC: 109 MMOL/L (ref 96–108)
CHLORIDE SERPL-SCNC: 112 MMOL/L (ref 96–108)
CO2 SERPL-SCNC: 18 MMOL/L (ref 21–32)
CO2 SERPL-SCNC: 20 MMOL/L (ref 21–32)
CORTIS SERPL-MCNC: 7.9 UG/DL
CREAT SERPL-MCNC: 2.58 MG/DL (ref 0.6–1.3)
CREAT SERPL-MCNC: 2.74 MG/DL (ref 0.6–1.3)
EOSINOPHIL # BLD AUTO: 0.13 THOUSAND/ÂΜL (ref 0–0.61)
EOSINOPHIL NFR BLD AUTO: 2 % (ref 0–6)
ERYTHROCYTE [DISTWIDTH] IN BLOOD BY AUTOMATED COUNT: 17.2 % (ref 11.6–15.1)
GFR SERPL CREATININE-BSD FRML MDRD: 16 ML/MIN/1.73SQ M
GFR SERPL CREATININE-BSD FRML MDRD: 17 ML/MIN/1.73SQ M
GLUCOSE SERPL-MCNC: 59 MG/DL (ref 65–140)
GLUCOSE SERPL-MCNC: 67 MG/DL (ref 65–140)
HCT VFR BLD AUTO: 33.1 % (ref 34.8–46.1)
HGB BLD-MCNC: 10.2 G/DL (ref 11.5–15.4)
IMM GRANULOCYTES # BLD AUTO: 0.03 THOUSAND/UL (ref 0–0.2)
IMM GRANULOCYTES NFR BLD AUTO: 0 % (ref 0–2)
INR PPP: 1.83 (ref 0.84–1.19)
LACTATE SERPL-SCNC: 5.3 MMOL/L (ref 0.5–2)
LACTATE SERPL-SCNC: 5.9 MMOL/L (ref 0.5–2)
LYMPHOCYTES # BLD AUTO: 0.93 THOUSANDS/ÂΜL (ref 0.6–4.47)
LYMPHOCYTES NFR BLD AUTO: 14 % (ref 14–44)
MAGNESIUM SERPL-MCNC: 1.1 MG/DL (ref 1.9–2.7)
MAGNESIUM SERPL-MCNC: 1.2 MG/DL (ref 1.9–2.7)
MCH RBC QN AUTO: 28.2 PG (ref 26.8–34.3)
MCHC RBC AUTO-ENTMCNC: 30.8 G/DL (ref 31.4–37.4)
MCV RBC AUTO: 91 FL (ref 82–98)
MONOCYTES # BLD AUTO: 0.37 THOUSAND/ÂΜL (ref 0.17–1.22)
MONOCYTES NFR BLD AUTO: 6 % (ref 4–12)
NEUTROPHILS # BLD AUTO: 5.21 THOUSANDS/ÂΜL (ref 1.85–7.62)
NEUTS SEG NFR BLD AUTO: 77 % (ref 43–75)
NRBC BLD AUTO-RTO: 0 /100 WBCS
PHOSPHATE SERPL-MCNC: 2.9 MG/DL (ref 2.3–4.1)
PLATELET # BLD AUTO: 156 THOUSANDS/UL (ref 149–390)
PLATELET # BLD AUTO: 191 THOUSANDS/UL (ref 149–390)
PMV BLD AUTO: 12.9 FL (ref 8.9–12.7)
PMV BLD AUTO: 13.1 FL (ref 8.9–12.7)
POTASSIUM SERPL-SCNC: 2.7 MMOL/L (ref 3.5–5.3)
POTASSIUM SERPL-SCNC: 3 MMOL/L (ref 3.5–5.3)
PROCALCITONIN SERPL-MCNC: 40.79 NG/ML
PROT SERPL-MCNC: 5.4 G/DL (ref 6.4–8.4)
PROTHROMBIN TIME: 21.3 SECONDS (ref 11.6–14.5)
QRS AXIS: 16 DEGREES
QRS AXIS: 51 DEGREES
QRSD INTERVAL: 74 MS
QRSD INTERVAL: 88 MS
QT INTERVAL: 308 MS
QT INTERVAL: 338 MS
QTC INTERVAL: 447 MS
QTC INTERVAL: 525 MS
RBC # BLD AUTO: 3.62 MILLION/UL (ref 3.81–5.12)
SODIUM SERPL-SCNC: 145 MMOL/L (ref 135–147)
SODIUM SERPL-SCNC: 148 MMOL/L (ref 135–147)
T WAVE AXIS: 172 DEGREES
T WAVE AXIS: 49 DEGREES
VANCOMYCIN SERPL-MCNC: 55.4 UG/ML (ref 10–20)
VENTRICULAR RATE: 127 BPM
VENTRICULAR RATE: 145 BPM
WBC # BLD AUTO: 6.72 THOUSAND/UL (ref 4.31–10.16)

## 2024-07-21 PROCEDURE — 83735 ASSAY OF MAGNESIUM: CPT | Performed by: NURSE PRACTITIONER

## 2024-07-21 PROCEDURE — 36415 COLL VENOUS BLD VENIPUNCTURE: CPT

## 2024-07-21 PROCEDURE — NC001 PR NO CHARGE: Performed by: NURSE PRACTITIONER

## 2024-07-21 PROCEDURE — 36620 INSERTION CATHETER ARTERY: CPT | Performed by: NURSE PRACTITIONER

## 2024-07-21 PROCEDURE — 36556 INSERT NON-TUNNEL CV CATH: CPT | Performed by: NURSE PRACTITIONER

## 2024-07-21 PROCEDURE — 99285 EMERGENCY DEPT VISIT HI MDM: CPT

## 2024-07-21 PROCEDURE — 84145 PROCALCITONIN (PCT): CPT

## 2024-07-21 PROCEDURE — 71045 X-RAY EXAM CHEST 1 VIEW: CPT

## 2024-07-21 PROCEDURE — 84484 ASSAY OF TROPONIN QUANT: CPT

## 2024-07-21 PROCEDURE — 3E033XZ INTRODUCTION OF VASOPRESSOR INTO PERIPHERAL VEIN, PERCUTANEOUS APPROACH: ICD-10-PCS | Performed by: RADIOLOGY

## 2024-07-21 PROCEDURE — 99291 CRITICAL CARE FIRST HOUR: CPT | Performed by: EMERGENCY MEDICINE

## 2024-07-21 PROCEDURE — 96367 TX/PROPH/DG ADDL SEQ IV INF: CPT

## 2024-07-21 PROCEDURE — 80048 BASIC METABOLIC PNL TOTAL CA: CPT | Performed by: NURSE PRACTITIONER

## 2024-07-21 PROCEDURE — 80202 ASSAY OF VANCOMYCIN: CPT | Performed by: NURSE PRACTITIONER

## 2024-07-21 PROCEDURE — 99291 CRITICAL CARE FIRST HOUR: CPT | Performed by: INTERNAL MEDICINE

## 2024-07-21 PROCEDURE — 83605 ASSAY OF LACTIC ACID: CPT

## 2024-07-21 PROCEDURE — 80053 COMPREHEN METABOLIC PANEL: CPT

## 2024-07-21 PROCEDURE — 87040 BLOOD CULTURE FOR BACTERIA: CPT

## 2024-07-21 PROCEDURE — 93010 ELECTROCARDIOGRAM REPORT: CPT

## 2024-07-21 PROCEDURE — 85730 THROMBOPLASTIN TIME PARTIAL: CPT | Performed by: INTERNAL MEDICINE

## 2024-07-21 PROCEDURE — 85610 PROTHROMBIN TIME: CPT

## 2024-07-21 PROCEDURE — 4A133J1 MONITORING OF ARTERIAL PULSE, PERIPHERAL, PERCUTANEOUS APPROACH: ICD-10-PCS | Performed by: STUDENT IN AN ORGANIZED HEALTH CARE EDUCATION/TRAINING PROGRAM

## 2024-07-21 PROCEDURE — 82805 BLOOD GASES W/O2 SATURATION: CPT | Performed by: NURSE PRACTITIONER

## 2024-07-21 PROCEDURE — 82533 TOTAL CORTISOL: CPT | Performed by: INTERNAL MEDICINE

## 2024-07-21 PROCEDURE — 4A133B1 MONITORING OF ARTERIAL PRESSURE, PERIPHERAL, PERCUTANEOUS APPROACH: ICD-10-PCS | Performed by: STUDENT IN AN ORGANIZED HEALTH CARE EDUCATION/TRAINING PROGRAM

## 2024-07-21 PROCEDURE — 85049 AUTOMATED PLATELET COUNT: CPT | Performed by: NURSE PRACTITIONER

## 2024-07-21 PROCEDURE — 96375 TX/PRO/DX INJ NEW DRUG ADDON: CPT

## 2024-07-21 PROCEDURE — 93005 ELECTROCARDIOGRAM TRACING: CPT

## 2024-07-21 PROCEDURE — 96368 THER/DIAG CONCURRENT INF: CPT

## 2024-07-21 PROCEDURE — 94002 VENT MGMT INPAT INIT DAY: CPT

## 2024-07-21 PROCEDURE — 03HY32Z INSERTION OF MONITORING DEVICE INTO UPPER ARTERY, PERCUTANEOUS APPROACH: ICD-10-PCS | Performed by: STUDENT IN AN ORGANIZED HEALTH CARE EDUCATION/TRAINING PROGRAM

## 2024-07-21 PROCEDURE — 96365 THER/PROPH/DIAG IV INF INIT: CPT

## 2024-07-21 PROCEDURE — 99223 1ST HOSP IP/OBS HIGH 75: CPT | Performed by: INTERNAL MEDICINE

## 2024-07-21 PROCEDURE — 71250 CT THORAX DX C-: CPT

## 2024-07-21 PROCEDURE — 94664 DEMO&/EVAL PT USE INHALER: CPT

## 2024-07-21 PROCEDURE — 85025 COMPLETE CBC W/AUTO DIFF WBC: CPT

## 2024-07-21 PROCEDURE — 02HV33Z INSERTION OF INFUSION DEVICE INTO SUPERIOR VENA CAVA, PERCUTANEOUS APPROACH: ICD-10-PCS | Performed by: RADIOLOGY

## 2024-07-21 PROCEDURE — 74176 CT ABD & PELVIS W/O CONTRAST: CPT

## 2024-07-21 PROCEDURE — 84100 ASSAY OF PHOSPHORUS: CPT | Performed by: NURSE PRACTITIONER

## 2024-07-21 PROCEDURE — 85730 THROMBOPLASTIN TIME PARTIAL: CPT

## 2024-07-21 PROCEDURE — 83735 ASSAY OF MAGNESIUM: CPT | Performed by: INTERNAL MEDICINE

## 2024-07-21 PROCEDURE — 94640 AIRWAY INHALATION TREATMENT: CPT

## 2024-07-21 PROCEDURE — 94760 N-INVAS EAR/PLS OXIMETRY 1: CPT

## 2024-07-21 RX ORDER — METOCLOPRAMIDE HYDROCHLORIDE 5 MG/ML
10 INJECTION INTRAMUSCULAR; INTRAVENOUS ONCE
Status: COMPLETED | OUTPATIENT
Start: 2024-07-21 | End: 2024-07-21

## 2024-07-21 RX ORDER — HEPARIN SODIUM 5000 [USP'U]/ML
5000 INJECTION, SOLUTION INTRAVENOUS; SUBCUTANEOUS EVERY 8 HOURS SCHEDULED
Status: DISCONTINUED | OUTPATIENT
Start: 2024-07-21 | End: 2024-07-21

## 2024-07-21 RX ORDER — POTASSIUM CHLORIDE 29.8 MG/ML
40 INJECTION INTRAVENOUS ONCE
Status: COMPLETED | OUTPATIENT
Start: 2024-07-21 | End: 2024-07-21

## 2024-07-21 RX ORDER — VANCOMYCIN HYDROCHLORIDE 1 G/200ML
10 INJECTION, SOLUTION INTRAVENOUS DAILY PRN
Status: DISCONTINUED | OUTPATIENT
Start: 2024-07-22 | End: 2024-07-22

## 2024-07-21 RX ORDER — HEPARIN SODIUM 1000 [USP'U]/ML
4000 INJECTION, SOLUTION INTRAVENOUS; SUBCUTANEOUS EVERY 6 HOURS PRN
Status: DISCONTINUED | OUTPATIENT
Start: 2024-07-21 | End: 2024-07-24

## 2024-07-21 RX ORDER — MIDODRINE HYDROCHLORIDE 5 MG/1
5 TABLET ORAL
Status: DISCONTINUED | OUTPATIENT
Start: 2024-07-21 | End: 2024-07-22

## 2024-07-21 RX ORDER — ALBUMIN (HUMAN) 12.5 G/50ML
25 SOLUTION INTRAVENOUS ONCE
Status: COMPLETED | OUTPATIENT
Start: 2024-07-21 | End: 2024-07-21

## 2024-07-21 RX ORDER — HEPARIN SODIUM 10000 [USP'U]/100ML
3-20 INJECTION, SOLUTION INTRAVENOUS
Status: DISCONTINUED | OUTPATIENT
Start: 2024-07-21 | End: 2024-07-24

## 2024-07-21 RX ORDER — ONDANSETRON 2 MG/ML
4 INJECTION INTRAMUSCULAR; INTRAVENOUS EVERY 6 HOURS PRN
Status: DISCONTINUED | OUTPATIENT
Start: 2024-07-21 | End: 2024-07-21 | Stop reason: SDUPTHER

## 2024-07-21 RX ORDER — MAGNESIUM SULFATE HEPTAHYDRATE 40 MG/ML
2 INJECTION, SOLUTION INTRAVENOUS ONCE
Status: COMPLETED | OUTPATIENT
Start: 2024-07-21 | End: 2024-07-22

## 2024-07-21 RX ORDER — HEPARIN SODIUM 1000 [USP'U]/ML
4000 INJECTION, SOLUTION INTRAVENOUS; SUBCUTANEOUS ONCE
Status: COMPLETED | OUTPATIENT
Start: 2024-07-21 | End: 2024-07-21

## 2024-07-21 RX ORDER — CHLORHEXIDINE GLUCONATE ORAL RINSE 1.2 MG/ML
15 SOLUTION DENTAL EVERY 12 HOURS SCHEDULED
Status: DISCONTINUED | OUTPATIENT
Start: 2024-07-21 | End: 2024-07-27

## 2024-07-21 RX ORDER — POTASSIUM CHLORIDE 14.9 MG/ML
20 INJECTION INTRAVENOUS
Status: COMPLETED | OUTPATIENT
Start: 2024-07-21 | End: 2024-07-22

## 2024-07-21 RX ORDER — HYDROXYCHLOROQUINE SULFATE 200 MG/1
200 TABLET, FILM COATED ORAL 2 TIMES DAILY
Status: DISCONTINUED | OUTPATIENT
Start: 2024-07-21 | End: 2024-07-22

## 2024-07-21 RX ORDER — LEVALBUTEROL INHALATION SOLUTION 1.25 MG/3ML
1.25 SOLUTION RESPIRATORY (INHALATION) EVERY 8 HOURS PRN
Status: DISCONTINUED | OUTPATIENT
Start: 2024-07-21 | End: 2024-07-23

## 2024-07-21 RX ORDER — FUROSEMIDE 10 MG/ML
40 INJECTION INTRAMUSCULAR; INTRAVENOUS ONCE
Status: COMPLETED | OUTPATIENT
Start: 2024-07-21 | End: 2024-07-21

## 2024-07-21 RX ORDER — PANTOPRAZOLE SODIUM 40 MG/1
40 TABLET, DELAYED RELEASE ORAL
Status: DISCONTINUED | OUTPATIENT
Start: 2024-07-22 | End: 2024-07-22

## 2024-07-21 RX ORDER — ACETAMINOPHEN 325 MG/1
650 TABLET ORAL EVERY 4 HOURS PRN
Status: DISCONTINUED | OUTPATIENT
Start: 2024-07-21 | End: 2024-08-02 | Stop reason: HOSPADM

## 2024-07-21 RX ORDER — ONDANSETRON 2 MG/ML
4 INJECTION INTRAMUSCULAR; INTRAVENOUS EVERY 4 HOURS PRN
Status: DISCONTINUED | OUTPATIENT
Start: 2024-07-21 | End: 2024-08-02 | Stop reason: HOSPADM

## 2024-07-21 RX ORDER — DIGOXIN 0.25 MG/ML
250 INJECTION INTRAMUSCULAR; INTRAVENOUS ONCE
Status: COMPLETED | OUTPATIENT
Start: 2024-07-21 | End: 2024-07-21

## 2024-07-21 RX ORDER — ALBUMIN (HUMAN) 12.5 G/50ML
50 SOLUTION INTRAVENOUS ONCE
Status: COMPLETED | OUTPATIENT
Start: 2024-07-21 | End: 2024-07-21

## 2024-07-21 RX ORDER — SODIUM CHLORIDE, SODIUM GLUCONATE, SODIUM ACETATE, POTASSIUM CHLORIDE, MAGNESIUM CHLORIDE, SODIUM PHOSPHATE, DIBASIC, AND POTASSIUM PHOSPHATE .53; .5; .37; .037; .03; .012; .00082 G/100ML; G/100ML; G/100ML; G/100ML; G/100ML; G/100ML; G/100ML
1000 INJECTION, SOLUTION INTRAVENOUS ONCE
Status: COMPLETED | OUTPATIENT
Start: 2024-07-21 | End: 2024-07-21

## 2024-07-21 RX ORDER — ALBUTEROL SULFATE 90 UG/1
2 AEROSOL, METERED RESPIRATORY (INHALATION) EVERY 6 HOURS PRN
Status: DISCONTINUED | OUTPATIENT
Start: 2024-07-21 | End: 2024-07-24

## 2024-07-21 RX ORDER — HEPARIN SODIUM 1000 [USP'U]/ML
2000 INJECTION, SOLUTION INTRAVENOUS; SUBCUTANEOUS EVERY 6 HOURS PRN
Status: DISCONTINUED | OUTPATIENT
Start: 2024-07-21 | End: 2024-07-24

## 2024-07-21 RX ORDER — ALBUMIN (HUMAN) 12.5 G/50ML
25 SOLUTION INTRAVENOUS ONCE
Status: DISCONTINUED | OUTPATIENT
Start: 2024-07-21 | End: 2024-07-21

## 2024-07-21 RX ORDER — AMMONIUM LACTATE 12 G/100G
CREAM TOPICAL 2 TIMES DAILY
Status: DISCONTINUED | OUTPATIENT
Start: 2024-07-21 | End: 2024-08-02 | Stop reason: HOSPADM

## 2024-07-21 RX ADMIN — FUROSEMIDE 40 MG: 10 INJECTION, SOLUTION INTRAMUSCULAR; INTRAVENOUS at 12:47

## 2024-07-21 RX ADMIN — METOCLOPRAMIDE 10 MG: 5 INJECTION, SOLUTION INTRAMUSCULAR; INTRAVENOUS at 13:44

## 2024-07-21 RX ADMIN — POTASSIUM CHLORIDE 20 MEQ: 14.9 INJECTION, SOLUTION INTRAVENOUS at 22:47

## 2024-07-21 RX ADMIN — MIDODRINE HYDROCHLORIDE 5 MG: 5 TABLET ORAL at 17:00

## 2024-07-21 RX ADMIN — SODIUM CHLORIDE, SODIUM GLUCONATE, SODIUM ACETATE, POTASSIUM CHLORIDE, MAGNESIUM CHLORIDE, SODIUM PHOSPHATE, DIBASIC, AND POTASSIUM PHOSPHATE 1000 ML: .53; .5; .37; .037; .03; .012; .00082 INJECTION, SOLUTION INTRAVENOUS at 10:43

## 2024-07-21 RX ADMIN — CHLORHEXIDINE GLUCONATE 15 ML: 1.2 RINSE ORAL at 20:46

## 2024-07-21 RX ADMIN — CEFEPIME 1000 MG: 1 INJECTION, POWDER, FOR SOLUTION INTRAMUSCULAR; INTRAVENOUS at 21:07

## 2024-07-21 RX ADMIN — ALBUMIN (HUMAN) 25 G: 0.25 INJECTION, SOLUTION INTRAVENOUS at 13:34

## 2024-07-21 RX ADMIN — ALTEPLASE 2 MG: 2.2 INJECTION, POWDER, LYOPHILIZED, FOR SOLUTION INTRAVENOUS at 17:28

## 2024-07-21 RX ADMIN — SODIUM CHLORIDE 500 ML: 0.9 INJECTION, SOLUTION INTRAVENOUS at 09:56

## 2024-07-21 RX ADMIN — DIGOXIN 250 MCG: 250 INJECTION, SOLUTION INTRAMUSCULAR; INTRAVENOUS; PARENTERAL at 14:38

## 2024-07-21 RX ADMIN — HYDROCORTISONE SODIUM SUCCINATE 100 MG: 100 INJECTION, POWDER, FOR SOLUTION INTRAMUSCULAR; INTRAVENOUS at 20:46

## 2024-07-21 RX ADMIN — POTASSIUM CHLORIDE 40 MEQ: 29.8 INJECTION, SOLUTION INTRAVENOUS at 10:45

## 2024-07-21 RX ADMIN — VANCOMYCIN HYDROCHLORIDE 1750 MG: 5 INJECTION, POWDER, LYOPHILIZED, FOR SOLUTION INTRAVENOUS at 11:10

## 2024-07-21 RX ADMIN — MAGNESIUM SULFATE HEPTAHYDRATE 2 G: 40 INJECTION, SOLUTION INTRAVENOUS at 22:47

## 2024-07-21 RX ADMIN — Medication 1 APPLICATION: at 18:24

## 2024-07-21 RX ADMIN — CEFEPIME 2000 MG: 2 INJECTION, POWDER, FOR SOLUTION INTRAVENOUS at 10:04

## 2024-07-21 RX ADMIN — LEVALBUTEROL HYDROCHLORIDE 1.25 MG: 1.25 SOLUTION RESPIRATORY (INHALATION) at 12:49

## 2024-07-21 RX ADMIN — NOREPINEPHRINE BITARTRATE 1 MCG/MIN: 1 INJECTION, SOLUTION, CONCENTRATE INTRAVENOUS at 14:44

## 2024-07-21 RX ADMIN — Medication 18 MCG/MIN: at 21:18

## 2024-07-21 RX ADMIN — HEPARIN SODIUM 4000 UNITS: 1000 INJECTION INTRAVENOUS; SUBCUTANEOUS at 16:20

## 2024-07-21 RX ADMIN — ALBUMIN (HUMAN) 50 G: 0.25 INJECTION, SOLUTION INTRAVENOUS at 09:35

## 2024-07-21 RX ADMIN — ONDANSETRON 4 MG: 2 INJECTION INTRAMUSCULAR; INTRAVENOUS at 12:41

## 2024-07-21 RX ADMIN — HEPARIN SODIUM 11.1 UNITS/KG/HR: 10000 INJECTION, SOLUTION INTRAVENOUS at 16:00

## 2024-07-21 NOTE — ASSESSMENT & PLAN NOTE
Acute renal failure Most likely pre-renal AMANDA 2/2 volume depletion/shock  Baseline creatinine 1-1.2  Renal function trending down  Monitor BMP

## 2024-07-21 NOTE — ASSESSMENT & PLAN NOTE
Undifferentiated  Continue cefepime  Check Vanco level  Consult infectious disease  Volume resuscitation completed  Follow lactic acid  Follow blood culture results

## 2024-07-21 NOTE — ASSESSMENT & PLAN NOTE
Admit to the intensive care unit  Cardiology consult  Continue digoxin load  Continuous cardiopulmonary monitor

## 2024-07-21 NOTE — RAPID RESPONSE
Rapid Response Note  Bhavna Al 74 y.o. female MRN: [id number]  Unit/Bed#: ED-15 Encounter: [id number]    Rapid Response Notification(s):   Response called date/time:  7/21/2024 12:30 PM  Response team arrival date/time:  7/21/2024 12:32 PM  Response end date/time:  7/21/2024 12:57 PM  Level of care:  [facility]  Rapid response location:  ED  Primary reason for rapid response call:  Acute change in heart rate and acute change in RR (iincreased work of breathing)    Rapid Response Intervention(s):   Airway:  Other (comment) (High flow nasal cannula)  Breathing:  Oxygen  Circulation:  None  Fluids administered:  None  Medications administered:  Furosemide (Zofran)       Assessment:   Diastolic heart failure  Volume overload  Increased work of breathing  Mild hypoxia  Nausea and vomiting    Plan:   Patient will be admitted to stepdown level two  Start high flow nasal cannula.  No BiPAP in the setting of nausea and vomiting  IV Zofran  IV Lasix     Rapid Response Outcome:   Transfer:  Transfer to stepdown 2  Primary service notified of transfer: Yes    Code Status: Level 1 (Full Code)      Family notified: Primary team to notify Family Member  family members present     Background/Situation:   Bhavna Al is a 74 y.o. female who resides in a rehab facility since 29 June 2024.  The patient had been previously admitted for MRSA bacteremia, treated successfully and discharged to rehab.  Patient was transported to [facility] this morning secondary to hypotension and increased work of breathing.  Upon initial assessment the patient appeared to be doing well.  Cuff pressures were inaccurate, manual blood pressure was ranging from the 90s to the low 100s systolic.  Patient was seen medically stable to be admitted to medical surgical unit.  Later in the day the patient began to experience increased work of breathing and mild hypoxia.  A rapid response was called.    Upon my arrival the patient is awake and alert.  The  "registered nurse informs me that the patient just vomited.  The patient was leaning over to her left side on nasal cannula breathing at a rate of approximately 26 breaths/min.  At that time she was on room air saturating at 98%.  Review of the patient's CAT scan she did appear to have pulmonary edema bilaterally.  Given her work of breathing, nausea and vomiting, and anxiety, we elected to place the patient on high flow nasal cannula, add IV Lasix and IV Zofran.  Digoxin was suggested but a cardiology consult was placed.  Therefore, we will await their instructions.    At this time the patient reports feeling short of breath.  She denies further nausea.  She denies headache, fever, visual disturbance, dry mouth, neck pain, chest pain, abdominal pain.  She does endorse shortness of breath    Review of Systems   Constitutional:  Positive for chills.        Tachypneic   Respiratory:  Positive for shortness of breath.    Cardiovascular: Negative.    Gastrointestinal:  Positive for nausea and vomiting.   Genitourinary: Negative.    Musculoskeletal: Negative.    Skin: Negative.    Allergic/Immunologic: Negative.    Neurological:         Chronic tremors   Hematological: Negative.    Psychiatric/Behavioral: Negative.         Objective:   Vitals:    07/21/24 1107 07/21/24 1217 07/21/24 1241 07/21/24 1249   BP: 92/66      BP Location: Right arm      Pulse:       Resp:       Temp:   97.8 °F (36.6 °C)    TempSrc:   Oral    SpO2:    100%   Weight:  106 kg (233 lb 11 oz)     Height:  5' 3\" (1.6 m)       Physical Exam  Constitutional:       General: She is in acute distress.      Appearance: She is obese. She is ill-appearing.   HENT:      Head: Normocephalic and atraumatic.      Right Ear: Tympanic membrane normal.      Left Ear: Tympanic membrane normal.      Nose: Nose normal.      Mouth/Throat:      Mouth: Mucous membranes are moist.   Eyes:      Extraocular Movements: Extraocular movements intact.      Conjunctiva/sclera: " Conjunctivae normal.   Cardiovascular:      Rate and Rhythm: Tachycardia present.      Pulses: Normal pulses.      Heart sounds: Normal heart sounds.   Pulmonary:      Effort: Respiratory distress present.      Breath sounds: Rales present.   Abdominal:      General: There is distension.      Palpations: Abdomen is soft.   Musculoskeletal:         General: Normal range of motion.      Cervical back: Normal range of motion and neck supple.   Skin:     General: Skin is warm and dry.      Capillary Refill: Capillary refill takes less than 2 seconds.   Neurological:      General: No focal deficit present.      Mental Status: She is alert and oriented to person, place, and time.   Psychiatric:         Mood and Affect: Mood normal.         Behavior: Behavior normal.         Thought Content: Thought content normal.         Judgment: Judgment normal.

## 2024-07-21 NOTE — ASSESSMENT & PLAN NOTE
Acute renal failure Most likely pre-renal AMANDA 2/2 volume depletion in the setting of sepsis  CT scan pending, ordered without IV contrast  -cont IVF bolus for treatment of sepsis  - normal PLT, doubt HUS/TTP  - No need for emergent HD at this point; close monitor.  - avoid renal toxins;hold RAAS blockers and diuretics.  - renal dose all medications as eGFR < 15  BP ok without AHT meds, monitor for now, may need to resume metoprolol for heart rate once blood pressure improved

## 2024-07-21 NOTE — ED NOTES
Pt is alert and conversational. Despite increased work of breathing and tachypnea pt is able to speak in full sentences. Pt is joking with her daughter at bedside.        Joaquina Ruiz RN  07/21/24 [id number]

## 2024-07-21 NOTE — ED NOTES
Upon returning from CT it was noted pt's work of breathing had increased and audible wheezes were noted. Provider was made aware.      Joaquina Ruiz RN  07/21/24 [id number]

## 2024-07-21 NOTE — QUICK NOTE
The 30ml/kg fluid bolus was not given to the patient despite hypotension and/or significantly elevated lactate of ? 4 and/or presence of septic shock due to: Concern for fluid/volume overload. The patient will be administered 1L of crystalloid fluid instead. Orders for this have been placed in [facility]. The patient may receive additional colloid or crystalloid fluids thereafter based on clinical condition.     Jeevan Haynes, DO

## 2024-07-21 NOTE — ASSESSMENT & PLAN NOTE
Monitor white count  Monitor Pro-Tapan  Monitor I's and O's  Monitor fever curve  Continue cefepime  Check Vanco level

## 2024-07-21 NOTE — ED ATTENDING ATTESTATION
7/21/2024  IMegan DO, saw and evaluated the patient. I have discussed the patient with the resident/non-physician practitioner and agree with the resident's/non-physician practitioner's findings, Plan of Care, and MDM as documented in the resident's/non-physician practitioner's note, except where noted. All available labs and Radiology studies were reviewed.  I was present for key portions of any procedure(s) performed by the resident/non-physician practitioner and I was immediately available to provide assistance.       At this point I agree with the current assessment done in the Emergency Department.  I have conducted an independent evaluation of this patient a history and physical is as follows:      A 73-year-old female with past medical history of rheumatoid arthritis (on plaquenil), hypertension, hyperparathyroidism, CKD, paroxysmal A-fib (on eliquis) and CHF; BIBA for lethargy and fatigue.  EMS report that pt was also found hypotensive by NH staff this morning.  On arrival, pt offers no complaints.  She denies fever, chills, chest pain, SOB, abd pain, N/V/D and new rashes.       Review of Records  Admitted from 6/6-18 for MRSA bacteremia with T9-T10 discitis/osteomyelitis.  She was discharged to rehab with a PICC line for IV daptomycin through 7/22  Admitted from 6/29-7/5 for Atrial fibrillation and CHF  Rate control with metoprolol and IV digoxin  Due to rising CPK, changed from daptomycin to vancomycin (through 7/22)    Physical Exam  General Appearance: alert and oriented, ill appearing  Skin:  Warm, dry.  Diffuse exfoliation of skin  HEENT: atraumatic, normocephalic  Neck: Supple, trachea midline  Cardiac: irregularly irregular, rate 130-140's.  Telemetry consistent with atrial fibrillation.  No murmurs, rub, gallops  Pulmonary: lungs CTAB; no wheezes, rales, rhonchi  Gastrointestinal: abdomen soft, nontender, nondistended; no guarding or rebound tenderness; good bowel sounds, no mass or  bruits  Extremities:  no pedal edema, 2+ pulses; no calf tenderness, no clubbing, no cyanosis  Neuro:  no focal motor or sensory deficits, CN 2-12 grossly intact  Psych:  Normal mood and affect, normal judgement and insight    Assessment and Plan:  Fatigue, pt noted to be hypotensive prehospital and on arrival to the ED.  She offers no other complaints and is mentating well.  Reviewed recent admission.  Concern for recurrent sepsis vs less likely cardiogenic etiology.  Will check labs including sepsis markers.  Will cautiously resuscitate with IVF.  Will give IV Abx.      ED Course  ED Course as of 07/22/24 2231   Sun Jul 21, 2024   1009 The 30ml/kg fluid bolus was not given to the patient despite hypotension and/or significantly elevated lactate of = 4 and/or presence of septic shock due to: Heart Failure (with recent admit for CHF). The patient will be administered 1200 cc (700 cc pre-hospital and 500 cc in ED) of crystalloid fluid instead. Orders for this have been placed in [facility]. The patient may receive additional colloid or crystalloid fluids thereafter based on clinical condition.    1246 Called to beside by RN - pt with increased work of breathing and worsening hypotension.  Now with diffuse bilateral rales.  Concern for acute pulmonary edema.  IVF stopped.  Will place on HFNC and diuresis.  Critical Care AP and SLIM admitting at bedside, agrees with care.   1438 Rapid response called again due to worsening hypotension.  On re-assessment, lungs with improved air entry and work of breathing improved.  Pt remains tachycardic in afib, BP 70/40's.  Plan to start levo and give IV digoxin.  Critical care at bedside, will admit to the ICU.         Critical Care Time  CriticalCare Time    Date/Time: 7/21/2024 10:12 AM    Performed by: Megan Alcala DO  Authorized by: Megan Alcala DO    Critical care provider statement:     Critical care time (minutes):  52    Critical care time was exclusive of:  Separately  billable procedures and treating other patients and teaching time    Critical care was necessary to treat or prevent imminent or life-threatening deterioration of the following conditions:  Circulatory failure, dehydration, renal failure, sepsis, shock and metabolic crisis    Critical care was time spent personally by me on the following activities:  Obtaining history from patient or surrogate, development of treatment plan with patient or surrogate, examination of patient, evaluation of patient's response to treatment, re-evaluation of patient's condition, ordering and review of radiographic studies, ordering and performing treatments and interventions, blood draw for specimens, review of old charts and ordering and review of laboratory studies (IVF, IV Abx, Pressors)    I assumed direction of critical care for this patient from another provider in my specialty: no

## 2024-07-21 NOTE — ASSESSMENT & PLAN NOTE
Prior to admission on Eliquis  Due to renal function and elevated troponin will discontinue and start heparin drip  Cardiology consultation placed  Resume metoprolol once heart rate has improved

## 2024-07-21 NOTE — ED PROVIDER NOTES
History  Chief Complaint   Patient presents with   • Hypotension     Pt comes in hypotensive. Pt is AOx4. Looks pales offers no complaints.      HPI  MDM  74 Y O F, brought in by EMS from nursing home for hypotension and AMS. Pt was recently discharged from hospital for bacteremia, and is currently on abx through picc line. This morning nursing home staff thought she was mildly altered.    On EMS arrival, she was hypotensive, was started on fluids and brought to  ED.    On my initial evaluation pt was mentating well, hypotensive with systolic in 70s, stating appropriately on 2L, non toxic  but ill appearing.     On exam   General:as above  Head: Normocephalic, atraumatic, nontender.  Eyes: EOM-No subconjunctival hemorrhages.  ENT: Nose atraumatic. MMM  No malocclusion. No stridor. Normal phonation. No drooling. Normal swallowing.   Neck: Trachea midline. No JVD.  CV: tachy irregular  Lungs: crackles in bases  Abd: +BS, soft, NT/ND. No guarding/rigidity.   MSK: Full ROM throughout. mildlower extremity edema.   Skin:diffuse peeling.   Neuro: AAOx2, GCS 14, CN II-XII grossly intact. Motor/sensory grossly intact.  Psychiatric/Behavioral: mood/affect normal; behavior normal; thought content normal; judgement normal   Exam: deferred      Ddx: septic shock     Plan: Pt was initially treated with 1.5L of fluids ( has hx of CHF) with improvement in blood pressure. Evaluated with septic labs, chest xray, started on broad spectrum abx.     Consulted ICU who recommended SLIM admission. Admitted to medicine service.     After admission. Rapid response was called for increased work of breathing, patient has bilateral crackles, likely volume overloaded, still starting appropriately but has acute worsening work of breathing.  ICU team and Slim team at bedside, ordered IV Lasix and put patient on high flow for work of breathing.    After the rapid response, pt was admitted to ICU on high flow.     Labs as interpreted by me  elevated Cr, elevated anion gap, lactic acidosis  EKG as interpreted by me no Acute ST changes.         Prior to Admission Medications   Prescriptions Last Dose Informant Patient Reported? Taking?   Zinc 50 MG TABS  Self, Outside Facility (Specify), Family Member Yes No   Sig: Take 1 tablet by mouth in the morning   acetaminophen (TYLENOL) 325 mg tablet  Self, Outside Facility (Specify), Family Member No No   Sig: Take 2 tablets (650 mg total) by mouth every 4 (four) hours as needed for mild pain, headaches or fever.   albuterol (Ventolin HFA) 90 mcg/act inhaler  Outside Facility (Specify), Self, Family Member No No   Sig: Inhale 2 puffs every 6 (six) hours as needed for wheezing   alendronate (FOSAMAX) 70 mg tablet  Self, Outside Facility (Specify), Family Member Yes No   Sig: Take by mouth every 7 days    ammonium lactate (LAC-HYDRIN) 12 % cream  Self, Outside Facility (Specify), Family Member No No   Sig: Apply topically 2 (two) times a day   apixaban (ELIQUIS) 5 mg  Outside Facility (Specify), Self, Family Member No No   Sig: Take 1 tablet (5 mg total) by mouth 2 (two) times a day   bisacodyl (FLEET) 10 MG/30ML ENEM  Self, Outside Facility (Specify), Family Member Yes No   Sig: Insert 10 mg into the rectum once   Patient not taking: Reported on 7/17/2024   cholecalciferol (VITAMIN D3) 1,000 units tablet  Outside Facility (Specify), Self, Family Member No No   Sig: Take 1 tablet (1,000 Units total) by mouth daily   clobetasol (TEMOVATE) 0.05 % cream  Self, Outside Facility (Specify), Family Member No No   Sig: Apply topically 2 (two) times a day   furosemide (LASIX) 20 mg tablet  Self, Outside Facility (Specify), Family Member No No   Sig: Take 1 tablet (20 mg total) by mouth daily   hydroxychloroquine (PLAQUENIL) 200 mg tablet  Outside Facility (Specify) No No   Sig: Take 1 tablet (200 mg total) by mouth 2 (two) times a day   lidocaine (LIDODERM) 5 %  Self, Outside Facility (Specify), Family Member No No   Sig:  Apply 2 patches topically daily Remove & Discard patch within 12 hours or as directed by MD Do not start before 2022.   metoprolol tartrate (LOPRESSOR) 25 mg tablet  Outside Facility (Specify), Self, Family Member No No   Sig: Take 1 tablet (25 mg total) by mouth every 12 (twelve) hours   miconazole 2 % cream  Self, Outside Facility (Specify), Family Member No No   Sig: Apply topically 2 (two) times a day   nystatin (MYCOSTATIN) powder  Outside Facility (Specify), Self, Family Member No No   Sig: Apply topically 2 (two) times a day   pantoprazole (PROTONIX) 40 mg tablet  Self, Outside Facility (Specify), Family Member No No   Sig: Take 1 tablet (40 mg total) by mouth daily in the early morning   predniSONE 5 mg tablet  Self, Outside Facility (Specify), Family Member No No   Sig: Take 1 tablet (5 mg total) by mouth 2 (two) times a day with meals Do not start before 2023.   triamcinolone (KENALOG) 0.1 % cream  Self, Outside Facility (Specify), Family Member Yes No   Sig: Apply topically 2 (two) times a day Apply to BUE and BLE topically everyday and evening shift for psoriasis   vancomycin (VANCOCIN)  Self, Outside Facility (Specify), Family Member No No   Sig: Inject 150 mL (750 mg total) into a catheter in a vein over 60 minutes at 150 mL/hr every 24 hours for 19 days   Patient not taking: Reported on 2024      Facility-Administered Medications: None       Past Medical History:   Diagnosis Date   • Abnormal thyroid function test     last assessed: 2015    • Arthritis    • Caries     last assessed: 2016    • Continuous opioid dependence (HCC) 2021   • Edema of right lower extremity     last assessed: 2015    • GERD (gastroesophageal reflux disease)    • Hypertension    • Medicare annual wellness visit, subsequent 2021   • Positive blood culture 3/11/2022   • Sarcoid        Past Surgical History:   Procedure Laterality Date   •  SECTION     • IR  NON-TUNNELED CENTRAL LINE PLACEMENT  6/7/2024   • IR PICC PLACEMENT SINGLE LUMEN  1/29/2024   • IR PICC PLACEMENT SINGLE LUMEN  6/17/2024   • MULTIPLE TOOTH EXTRACTIONS N/A 8/27/2016    Procedure: Surgical extraction of teeth 2, 18, 19, 30, 31; incision and drainage of left subperiosteal abscess ;  Surgeon: Clara Cevallos DMD;  Location: [facility];  Service:        Family History   Problem Relation Age of Onset   • Asthma Mother    • Stroke Mother    • No Known Problems Father    • No Known Problems Sister    • No Known Problems Brother    • No Known Problems Maternal Grandmother    • No Known Problems Maternal Grandfather    • No Known Problems Paternal Grandmother    • No Known Problems Paternal Grandfather    • Diabetes Maternal Aunt    • Breast cancer Cousin    • Diabetes Cousin    • Breast cancer Other      I have reviewed and agree with the history as documented.    E-Cigarette/Vaping   • E-Cigarette Use Never User      E-Cigarette/Vaping Substances   • Nicotine No    • THC No    • CBD No    • Flavoring No    • Other No    • Unknown No      Social History     Tobacco Use   • Smoking status: Never   • Smokeless tobacco: Never   Vaping Use   • Vaping status: Never Used   Substance Use Topics   • Alcohol use: Not Currently   • Drug use: No        Review of Systems    Physical Exam  ED Triage Vitals   Temp Pulse Resp BP SpO2   -- -- -- -- --      Temp src Heart Rate Source Patient Position - Orthostatic VS BP Location FiO2 (%)   -- -- -- -- --      Pain Score       --             Orthostatic Vital Signs  There were no vitals filed for this visit.    Physical Exam    ED Medications  Medications - No data to display    Diagnostic Studies  Results Reviewed       None                   No orders to display         Procedures  Procedures      ED Course  ED Course as of 07/30/24 1017   Sun Jul 21, 2024   0955 1.5L fluids in total + albumin, recheck pressure. Afebrile.      0958 Will recheck pressure after fluids,  and consider levo and/or dig at that time     1000 Hemoglobin(!): 10.2  Hb stable   1000 WBC: 6.72   1007 Blood Pressure(!): 78/62   1007 hs TnI 0hr(!): 1,374   1008 Potassium(!): 2.7   1029 hs TnI 0hr(!): 1,374   1057 LACTIC ACID(!!): 5.3   1250 Rapid response was called for increased work of breathing, patient has bilateral crackles, likely volume overloaded, still starting appropriately but has acute worsening work of breathing.  ICU team and Slim team at bedside, ordered IV Lasix and put patient on high flow for work of breathing.                                       Medical Decision Making  Amount and/or Complexity of Data Reviewed  Labs: ordered. Decision-making details documented in ED Course.    Risk  Prescription drug management.  Decision regarding hospitalization.          Disposition  Final diagnoses:   None     ED Disposition       None          Follow-up Information    None         Patient's Medications   Discharge Prescriptions    No medications on file     No discharge procedures on file.    PDMP Review         Value Time User    PDMP Reviewed  Yes 5/26/2023 11:45 PM Kb Sethi DO             ED Provider  Attending physically available and evaluated Bhavna Al. I managed the patient along with the ED Attending.    Electronically Signed by           Arianna Oconnor DO  07/30/24 1013

## 2024-07-21 NOTE — ED NOTES
Patient noted to have increased work of breathing, fatigue, pale and decreased BP. Primary RN at bedside with patient. Rapid response called. Critical care, RT, ED staff to bedside.      Lynda Cody RN  07/21/24 5572

## 2024-07-21 NOTE — ED NOTES
Pt had another episode of emeses. Automatic BPs were noted to trend down. Manual BP was taken was 78/44. Provider made aware.      Joaquina Ruiz RN  07/21/24 9398       Joaquina Ruiz RN  07/21/24 8786

## 2024-07-21 NOTE — ASSESSMENT & PLAN NOTE
Possibly secondary to sepsis  Start IV pressors  Hold further fluids secondary to diastolic congestive heart failure  Start digoxin in the setting of A-fib with RVR  Admit to the intensive care unit  Continuous cardiopulmonary monitoring  Maintain mean arterial pressure 65 or greater

## 2024-07-21 NOTE — PLAN OF CARE
Problem: Prexisting or High Potential for Compromised Skin Integrity  Goal: Skin integrity is maintained or improved  Description: INTERVENTIONS:  - Identify patients at risk for skin breakdown  - Assess and monitor skin integrity  - Assess and monitor nutrition and hydration status  - Monitor labs   - Assess for incontinence   - Turn and reposition patient  - Assist with mobility/ambulation  - Relieve pressure over bony prominences  - Avoid friction and shearing  - Provide appropriate hygiene as needed including keeping skin clean and dry  - Evaluate need for skin moisturizer/barrier cream  - Collaborate with interdisciplinary team   - Patient/family teaching  - Consider wound care consult   Outcome: Progressing     Problem: PAIN - ADULT  Goal: Verbalizes/displays adequate comfort level or baseline comfort level  Description: Interventions:  - Encourage patient to monitor pain and request assistance  - Assess pain using appropriate pain scale  - Administer analgesics based on type and severity of pain and evaluate response  - Implement non-pharmacological measures as appropriate and evaluate response  - Consider cultural and social influences on pain and pain management  - Notify physician/advanced practitioner if interventions unsuccessful or patient reports new pain  Outcome: Progressing     Problem: INFECTION - ADULT  Goal: Absence or prevention of progression during hospitalization  Description: INTERVENTIONS:  - Assess and monitor for signs and symptoms of infection  - Monitor lab/diagnostic results  - Monitor all insertion sites, i.e. indwelling lines, tubes, and drains  - Monitor endotracheal if appropriate and nasal secretions for changes in amount and color  - Grants appropriate cooling/warming therapies per order  - Administer medications as ordered  - Instruct and encourage patient and family to use good hand hygiene technique  - Identify and instruct in appropriate isolation precautions for  identified infection/condition  Outcome: Progressing  Goal: Absence of fever/infection during neutropenic period  Description: INTERVENTIONS:  - Monitor WBC    Outcome: Progressing     Problem: SAFETY ADULT  Goal: Patient will remain free of falls  Description: INTERVENTIONS:  - Educate patient/family on patient safety including physical limitations  - Instruct patient to call for assistance with activity   - Consult OT/PT to assist with strengthening/mobility   - Keep Call bell within reach  - Keep bed low and locked with side rails adjusted as appropriate  - Keep care items and personal belongings within reach  - Initiate and maintain comfort rounds  - Make Fall Risk Sign visible to staff  - Offer Toileting every 2 Hours, in advance of need  - Initiate/Maintain bed alarm  - Obtain necessary fall risk management equipment  - Apply yellow socks and bracelet for high fall risk patients  - Consider moving patient to room near nurses station  Outcome: Progressing  Goal: Maintain or return to baseline ADL function  Description: INTERVENTIONS:  -  Assess patient's ability to carry out ADLs; assess patient's baseline for ADL function and identify physical deficits which impact ability to perform ADLs (bathing, care of mouth/teeth, toileting, grooming, dressing, etc.)  - Assess/evaluate cause of self-care deficits   - Assess range of motion  - Assess patient's mobility; develop plan if impaired  - Assess patient's need for assistive devices and provide as appropriate  - Encourage maximum independence but intervene and supervise when necessary  - Involve family in performance of ADLs  - Assess for home care needs following discharge   - Consider OT consult to assist with ADL evaluation and planning for discharge  - Provide patient education as appropriate  Outcome: Progressing  Goal: Maintains/Returns to pre admission functional level  Description: INTERVENTIONS:  - Perform AM-PAC 6 Click Basic Mobility/ Daily Activity  assessment daily.  - Set and communicate daily mobility goal to care team and patient/family/caregiver.   - Collaborate with rehabilitation services on mobility goals if consulted  - Perform Range of Motion 3 times a day.  - Reposition patient every 2 hours.  - Dangle patient 3 times a day  - Stand patient 3 times a day  - Ambulate patient 3 times a day  - Out of bed to chair 3 times a day   - Out of bed for meals 3 times a day  - Out of bed for toileting  - Record patient progress and toleration of activity level   Outcome: Progressing     Problem: DISCHARGE PLANNING  Goal: Discharge to home or other facility with appropriate resources  Description: INTERVENTIONS:  - Identify barriers to discharge w/patient and caregiver  - Arrange for needed discharge resources and transportation as appropriate  - Identify discharge learning needs (meds, wound care, etc.)  - Arrange for interpretive services to assist at discharge as needed  - Refer to Case Management Department for coordinating discharge planning if the patient needs post-hospital services based on physician/advanced practitioner order or complex needs related to functional status, cognitive ability, or social support system  Outcome: Progressing     Problem: Knowledge Deficit  Goal: Patient/family/caregiver demonstrates understanding of disease process, treatment plan, medications, and discharge instructions  Description: Complete learning assessment and assess knowledge base.  Interventions:  - Provide teaching at level of understanding  - Provide teaching via preferred learning methods  Outcome: Progressing

## 2024-07-21 NOTE — PROGRESS NOTES
Bhavna Al is a 74 y.o. female who is currently ordered Vancomycin IV with management by the Pharmacy Consult service.  Relevant clinical data and objective / subjective history reviewed.  Vancomycin Assessment:  Indication and Goal AUC/Trough: Bone/joint infection (goal -600, trough >10); sepsis  Clinical Status: admitted today, septic shock on pressors  Micro:   Blood cultures pending  Renal Function:  SCr: 2.58 mg/dL (AMANDA)  Renal replacement: Not on dialysis  Days of Therapy: 1  Current Dose: patient received vancomycin 1,750 mg IV load this am ~ 1100    Vancomycin Plan:  New Dosing: will pulse-dose due to AMANDA  Next Level: draw vanco random level tomorrow am  Renal Function Monitoring: Daily BMP and UOP  Pharmacy will continue to follow closely for s/sx of nephrotoxicity, infusion reactions and appropriateness of therapy.  BMP and CBC will be ordered per protocol. We will continue to follow the patient’s culture results and clinical progress daily.    Yolanda Ruano, Pharmacist

## 2024-07-21 NOTE — ASSESSMENT & PLAN NOTE
Pressure ulcer of the buttocks during last admission  Specialty bed ordered given soft tissue injury and bedbound status

## 2024-07-21 NOTE — ASSESSMENT & PLAN NOTE
Continue cefepime  Check random Vanco level  Fluid resuscitation completed  Monitor white count  Monitor procalcitonin  Continuous cardiopulmonary monitoring

## 2024-07-21 NOTE — ED NOTES
Manual BP was retaken and still 70s over 60s. Difficult to obtain  despite Albumin. Dr. Haynes made aware.      Joaquina Ruiz RN  07/21/24 5399       Joaquina Ruiz RN  07/21/24 3591

## 2024-07-21 NOTE — H&P
[facility]  H&P  Name: Bhavna Al 74 y.o. female I MRN: [id number]  Unit/Bed#: ED-15 I Date of Admission: 7/21/2024   Date of Service: 7/21/2024 I Hospital Day: 0      Assessment and Plan  * Sepsis ([facility])  Assessment & Plan  Patient presenting with sepsis of possible intra-abdominal versus orthopedic etiology given previous history of osteomyelitis of the spine  Sepsis as evidenced by tachycardia, elevated respiratory rate  This is concerning for possible severe sepsis given elevated lactic acid of 5.6  Evaluated by ICU who felt that patient was not appropriate for ICU level care.  At this time we will order additional IV fluid to meet 30 cc/kg  Close monitor for additional IV fluid given history of congestive heart failure  Sepsis alert activated, ER nursing aware  Patient is on prednisone 5 mg twice a day and high risk for adrenal insufficiency  Start stress dose steroids with hydrocortisone 100 mg every 12 hours, can wean steroids back to home dose once plus hypotension  Continue cefepime #1  Continue vancomycin #1  CT chest abdomen pending for possible source  Significantly elevated procalcitonin, possible bacteremia    Hypokalemia  Assessment & Plan  Will replete  Hold furosemide  Recent Labs     07/21/24  0933   K 2.7*           AMANDA (acute kidney injury) ([facility])  Assessment & Plan  Acute renal failure Most likely pre-renal AMANDA 2/2 volume depletion in the setting of sepsis  CT scan pending, ordered without IV contrast  -cont IVF bolus for treatment of sepsis  - normal PLT, doubt HUS/TTP  - No need for emergent HD at this point; close monitor.  - avoid renal toxins;hold RAAS blockers and diuretics.  - renal dose all medications as eGFR < 15  BP ok without AHT meds, monitor for now, may need to resume metoprolol for heart rate once blood pressure improved    Atrial fibrillation with RVR ([facility])  Assessment & Plan  Prior to admission on Eliquis  Due to renal function and elevated troponin  will discontinue and start heparin drip  Cardiology consultation placed  Resume metoprolol once heart rate has improved    MRSA bacteremia  Assessment & Plan  Possible secondary to osteomyelitis of the spine  Initially presented on 6/6/2024 with lethargy, poor oral intake  IV daptomycin changed to IV Vanco due to rising CPK through 7/22 per ID then starting PO Doxy until inflammatory markers stabilize/normalize  Bracing deferred due to bedbound status, body habitus and underlying skin condition.  Blood cultures pending  Infectious disease consultation placed    Deep tissue injury  Assessment & Plan  Pressure ulcer of the buttocks during last admission  Specialty bed ordered given soft tissue injury and bedbound status      Ambulatory dysfunction  Assessment & Plan  Currently at [place] for IV antibiotics due to recent MRSA bacteremia through 7/22  Continue PT OT consultations once clinically stable    Elevated troponin  Assessment & Plan  Denies any chest pain  Possibly type II MI in the setting of sepsis and hypotension  Start heparin drip  Cardiology consultation placed, no current chest pain  EKG with A-fib RVR, no acute ST segment elevations, nonspecific T wave changes  Lab Results   Component Value Date    HSTNI0 1,374 (H) 07/21/2024         Morbid obesity (HCC)  Assessment & Plan  Recommend outpatient weight loss when appropriate    Rheumatoid arthritis (HCC)  Assessment & Plan  Continue Plaquenil 200 mg  Prior to admission on prednisone 5 mg twice a day  Does have psoriasis        Code Status: Level 1 - Full Code     VTE Prophylaxis: Heparin Drip  / sequential compression device     Discussion with family: Family at bedside    Anticipated Length of Stay:  Patient will be admitted on an Inpatient basis with an anticipated length of stay of greater than 2 midnights.   Justification for Hospital Stay: Sepsis (HCC)     Total Time for Visit, including Counseling / Coordination of Care: 76 minutes.  Greater than  50% of this total time spent on direct patient counseling and coordination of care.    Chief Complaint:     Chief Complaint   Patient presents with    Hypotension     Pt comes in hypotensive. Pt is AOx4. Looks pales offers no complaints.        History of Present Illness:    Bhavna Al is a 74 y.o. female who presents with abdominal pain, as well as decreased p.o. intake and low blood pressure from her short-term rehab.  The patient has a past medical history of MRSA bacteremia felt to be secondary to osteomyelitis.  She also has a history of chronic diastolic heart failure, atrial fibrillation maintained on Eliquis, GERD, hypertension as well as rheumatoid arthritis on Plaquenil and chronic corticosteroid use.  At the time my evaluation the patient denies any cough or congestion, no fevers or chills.  She has been residing in a short-term nursing facility for IV antibiotics and deconditioning.  She reports decreased p.o. intake, and states the last time she can member eating was on Friday.  In the emergency room she was noted to be significantly hypotensive, she had been evaluated urgently by the ICU who recommended that the patient was stable for medical surgical evaluation on the hospital medical floor.  Patient denies any other medication changes, she receives most of them from her short-term rehab.  Patient denies any urinary frequency or dysuria.      Review of Systems:    A complete and comprehensive 14 point organ system review was performed and all other systems are negative other than stated above in the HPI    Past Medical and Surgical History:     Past Medical History:   Diagnosis Date    Abnormal thyroid function test     last assessed: Sept 25, 2015     Arthritis     Caries     last assessed: Sept 9, 2016     Continuous opioid dependence (HCC) 12/2/2021    Edema of right lower extremity     last assessed: March 18, 2015     GERD (gastroesophageal reflux disease)     Hypertension     Medicare annual  wellness visit, subsequent 2021    Positive blood culture 3/11/2022    Sarcoid        Past Surgical History:   Procedure Laterality Date     SECTION      IR NON-TUNNELED CENTRAL LINE PLACEMENT  2024    IR PICC PLACEMENT SINGLE LUMEN  2024    IR PICC PLACEMENT SINGLE LUMEN  2024    MULTIPLE TOOTH EXTRACTIONS N/A 2016    Procedure: Surgical extraction of teeth 2, 18, 19, 30, 31; incision and drainage of left subperiosteal abscess ;  Surgeon: Clara Cevallos DMD;  Location: [facility];  Service:        Meds/Allergies:    Prior to Admission medications    Medication Sig Start Date End Date Taking? Authorizing Provider   acetaminophen (TYLENOL) 325 mg tablet Take 2 tablets (650 mg total) by mouth every 4 (four) hours as needed for mild pain, headaches or fever. 16   AZIZA Hyatt   albuterol (Ventolin HFA) 90 mcg/act inhaler Inhale 2 puffs every 6 (six) hours as needed for wheezing 22   Nya Taveras DO   alendronate (FOSAMAX) 70 mg tablet Take by mouth every 7 days     Historical Provider, MD   ammonium lactate (LAC-HYDRIN) 12 % cream Apply topically 2 (two) times a day 24   Genie Abbasi MD   apixaban (ELIQUIS) 5 mg Take 1 tablet (5 mg total) by mouth 2 (two) times a day 11/10/22   Nya Taveras DO   bisacodyl (FLEET) 10 MG/30ML ENEM Insert 10 mg into the rectum once  Patient not taking: Reported on 2024    Historical Provider, MD   cholecalciferol (VITAMIN D3) 1,000 units tablet Take 1 tablet (1,000 Units total) by mouth daily 11/10/22   Nya Taveras DO   clobetasol (TEMOVATE) 0.05 % cream Apply topically 2 (two) times a day 23   AZIZA Hyatt   furosemide (LASIX) 20 mg tablet Take 1 tablet (20 mg total) by mouth daily 24  Genie Abbasi MD   hydroxychloroquine (PLAQUENIL) 200 mg tablet Take 1 tablet (200 mg total) by mouth 2 (two) times a day 11/10/22 6/27/24  Nya Taveras, DO   lidocaine (LIDODERM) 5 % Apply  2 patches topically daily Remove & Discard patch within 12 hours or as directed by MD Do not start before December 20, 2022. 12/20/22   Ann-Marie Connell, DO   metoprolol tartrate (LOPRESSOR) 25 mg tablet Take 1 tablet (25 mg total) by mouth every 12 (twelve) hours 11/10/22   Nya Taveras, DO   miconazole 2 % cream Apply topically 2 (two) times a day 6/18/24   Jason Quach MD   nystatin (MYCOSTATIN) powder Apply topically 2 (two) times a day 11/25/22   Federico Piña MD   pantoprazole (PROTONIX) 40 mg tablet Take 1 tablet (40 mg total) by mouth daily in the early morning 6/19/24   Jason Quach MD   predniSONE 5 mg tablet Take 1 tablet (5 mg total) by mouth 2 (two) times a day with meals Do not start before June 5, 2023. 6/5/23   AZIZA Hyatt   triamcinolone (KENALOG) 0.1 % cream Apply topically 2 (two) times a day Apply to BUE and BLE topically everyday and evening shift for psoriasis    Historical Provider, MD   vancomycin (VANCOCIN) Inject 150 mL (750 mg total) into a catheter in a vein over 60 minutes at 150 mL/hr every 24 hours for 19 days  Patient not taking: Reported on 7/17/2024 7/3/24 7/22/24  Vane Davis MD   Zinc 50 MG TABS Take 1 tablet by mouth in the morning    Historical Provider, MD     I have reviewed home medications with patient personally.    Allergies:   Allergies   Allergen Reactions    Shellfish-Derived Products - Food Allergy Itching    Methotrexate Derivatives     Amoxicillin Rash    Ampicillin-Sulbactam Sodium Rash       Social History:     Marital Status: Single   Occupation: Unknown    Substance Use History:   Social History     Substance and Sexual Activity   Alcohol Use Not Currently     Social History     Tobacco Use   Smoking Status Never   Smokeless Tobacco Never     Social History     Substance and Sexual Activity   Drug Use No       Family History:    Family History   Problem Relation Age of Onset    Asthma Mother     Stroke Mother      No Known Problems Father     No Known Problems Sister     No Known Problems Brother     No Known Problems Maternal Grandmother     No Known Problems Maternal Grandfather     No Known Problems Paternal Grandmother     No Known Problems Paternal Grandfather     Diabetes Maternal Aunt     Breast cancer Cousin     Diabetes Cousin     Breast cancer Other        Physical Exam:     Vitals:   Blood Pressure: 92/66 (07/21/24 1107)  Pulse: (!) 136 (07/21/24 1045)  Temperature: 98.1 °F (36.7 °C) (07/21/24 0954)  Temp Source: Oral (07/21/24 0954)  Respirations: (!) 29 (07/21/24 1045)  SpO2: 100 % (07/21/24 1045)      General: ill appearing, no acute distress  HEENT: atraumatic, PERRLA, dry oral mucosa, normal pharynx, normal tonsils and adenoids, normal tongue, no fluid in sinuses  Neck: Trachea midline, no carotid bruit, no masses  Respiratory: normal chest wall expansion, CTA B, no r/r/w, no rubs  Cardiovascular: RRR, no m/r/g, Normal S1 and S2  Abdomen: Soft, non-tender, non-distended, normal bowel sounds in all quadrants, no hepatosplenomegaly, no tympany  Rectal: deferred  Musculoskeletal: Moves all integumentary: warm, dry, and pink, with no rash, purpura, or petechia  Heme/Lymph: no lymphadenopathy, no bruises  Neurological: Cranial Nerves II-XII grossly intact  Psychiatric: cooperative   Additional Data:     Lab Results: Laboratory studies reviewed for today    Results from last 7 days   Lab Units 07/21/24  0933   WBC Thousand/uL 6.72   HEMOGLOBIN g/dL 10.2*   HEMATOCRIT % 33.1*   PLATELETS Thousands/uL 191   SEGS PCT % 77*   LYMPHO PCT % 14   MONO PCT % 6   EOS PCT % 2     Results from last 7 days   Lab Units 07/21/24  0933   SODIUM mmol/L 145   POTASSIUM mmol/L 2.7*   CHLORIDE mmol/L 109*   CO2 mmol/L 20*   BUN mg/dL 24   CREATININE mg/dL 2.58*   ANION GAP mmol/L 16*   CALCIUM mg/dL 8.7   ALBUMIN g/dL 2.5*   TOTAL BILIRUBIN mg/dL 0.60   ALK PHOS U/L 46   ALT U/L 23   AST U/L 27   GLUCOSE RANDOM mg/dL 67     Results  from last 7 days   Lab Units 07/21/24  0933   INR  1.83*             Results from last 7 days   Lab Units 07/21/24  0933   LACTIC ACID mmol/L 5.3*   PROCALCITONIN ng/ml 40.79*     Results from last 7 days   Lab Units 07/21/24  0933   HS TNI 0HR ng/L 1,374*       Imaging: I have personally reviewed pertinent reports.      XR chest portable    (Results Pending)   CT chest abdomen pelvis wo contrast    (Results Pending)       EKG, Pathology, and Other Studies Reviewed on Admission:   Reviewed previous discharge summary  Reviewed most recent infectious disease note  CT chest abdomen pelvis and is currently pending    [employer] Records Reviewed: Yes    ** Please Note: This note was completed in part utilizing [employer] Dragon [employer] One Software.  Grammatical errors, random word insertions, spelling mistakes, and incomplete sentences may be an occasional consequence of this system secondary to software limitations, ambient noise, and hardware issues.  If you have any questions or concerns about the content, text, or information contained within the body of this dictation, please contact the provider for clarification.**

## 2024-07-21 NOTE — ASSESSMENT & PLAN NOTE
Currently at [place] for IV antibiotics due to recent MRSA bacteremia through 7/22  Continue PT OT consultations once clinically stable

## 2024-07-21 NOTE — ASSESSMENT & PLAN NOTE
Patient presenting with sepsis of possible intra-abdominal versus orthopedic etiology given previous history of osteomyelitis of the spine  Sepsis as evidenced by tachycardia, elevated respiratory rate  This is concerning for possible severe sepsis given elevated lactic acid of 5.6  Evaluated by ICU who felt that patient was not appropriate for ICU level care.  At this time we will order additional IV fluid to meet 30 cc/kg  Close monitor for additional IV fluid given history of congestive heart failure  Sepsis alert activated, ER nursing aware  Patient is on prednisone 5 mg twice a day and high risk for adrenal insufficiency  Chart stress dose steroids with hydrocortisone 100 mg every 12 hours, can wean steroids back to home dose once plus hypotension  Continue cefepime  Continue vancomycin  CT chest abdomen pending for possible source  Significantly elevated procalcitonin, possible bacteremia

## 2024-07-21 NOTE — ASSESSMENT & PLAN NOTE
Patient presenting with sepsis of possible intra-abdominal versus orthopedic etiology given previous history of osteomyelitis of the spine  Sepsis as evidenced by tachycardia, elevated respiratory rate  Infectious disease input appreciated

## 2024-07-21 NOTE — ED NOTES
Pt was turned from side to side, bed sheets were wet and these were changed and depends changed.      Joaquina Ruiz, DESTINI  07/21/24 [id number]

## 2024-07-21 NOTE — ASSESSMENT & PLAN NOTE
Denies any chest pain  Possibly type II MI in the setting of sepsis and hypotension  Start heparin drip  Cardiology consultation placed, no current chest pain  EKG with A-fib RVR, no acute ST segment elevations, nonspecific T wave changes  Lab Results   Component Value Date    HSTNI0 1,374 (H) 07/21/2024

## 2024-07-21 NOTE — ASSESSMENT & PLAN NOTE
Undefined primary source possibly due to chronic rash with areas of skin breakdown gated by thoracic vertebral osteomyelitis  Infectious disease input appreciated continue with doxycycline

## 2024-07-21 NOTE — CONSULTS
[facility]  Consult  Name: Bhavna Al 74 y.o. female I MRN: [id number]  Unit/Bed#: ICU 02 I Date of Admission: 7/21/2024   Date of Service: 7/21/2024 I Hospital Day: 0    Consults    Assessment & Plan   Hypotension  Assessment & Plan  Possibly secondary to sepsis  Start IV pressors  Hold further fluids secondary to diastolic congestive heart failure  Start digoxin in the setting of A-fib with RVR  Admit to the intensive care unit  Continuous cardiopulmonary monitoring  Maintain mean arterial pressure 65 or greater    Chronic pruritic rash in adult  Assessment & Plan  Diagnosis psoriasis proven by biopsy  Continue to use Lac-Hydrin lotion as needed    Hypokalemia  Assessment & Plan  Replete as needed  Monitor electrolytes    AMANDA (acute kidney injury) ([facility])  Assessment & Plan  Monitor kidney indices  Avoid nephrotoxic medications    Atrial fibrillation with RVR ([facility])  Assessment & Plan  Admit to the intensive care unit  Cardiology consult  Continue digoxin load  Continuous cardiopulmonary monitor    Deep tissue injury  Assessment & Plan  Float heels  Monitor skin  Continue Lac-Hydrin as needed  Consider wound consult if needed    Ambulatory dysfunction  Assessment & Plan  Consult physical therapy  Consult Occupational Therapy      Elevated troponin  Assessment & Plan  Cardiology consulted  Continue to trend troponins  Continuous cardiopulmonary monitoring      Shock ([facility])  Assessment & Plan  Undifferentiated  Continue cefepime  Check Vanco level  Consult infectious disease  Volume resuscitation completed  Follow lactic acid  Follow blood culture results    Morbid obesity ([facility])  Assessment & Plan  Recommend decrease caloric intake and increase activity    Rheumatoid arthritis ([facility])  Assessment & Plan  Continue Tylenol 650 mg p.o. every 6 hours  Continue Plaquenil    SIRS (systemic inflammatory response syndrome) ([facility])  Assessment & Plan  Monitor white count  Monitor Pro-Tapan  Monitor I's and  O's  Monitor fever curve  Continue cefepime  Check Vanco level    * Sepsis (HCC)  Assessment & Plan  Continue cefepime  Check random Vanco level  Fluid resuscitation completed  Monitor white count  Monitor procalcitonin  Continuous cardiopulmonary monitoring           History of Present Illness     HPI: Bhavna Al is a 74 y.o. who presents with hypotension and mild hypoxia.  Patient was seen initially in the emergency department was treated with IV fluids with improvement of her blood pressure.  However, the patient was has diastolic congestive heart failure and developed hypoxia secondary to volume overload.  Lasix was given and the patient's breathing did improve but she became hypotensive requiring IV pressors.  Additionally, the patient's PICC line was found to be occluded and cathflo was ordered.    Patient denies complaints of pain, she complains of feeling cold.  She denies headache, visual disturbances, neck pain, chest pain, shortness of breath, abdominal discomfort.  She did have 1 episode of vomiting requiring IV Zofran which resolved her nausea.  She offers no further complaints.    History obtained from the patient.  Review of Systems: See HPI for Review of Systems  Disposition: Critical care   Historical Information   Past Medical History:  No date: Abnormal thyroid function test      Comment:  last assessed: 2015   No date: Arthritis  No date: Caries      Comment:  last assessed: 2021: Continuous opioid dependence (HCC)  No date: Edema of right lower extremity      Comment:  last assessed: 2015   No date: GERD (gastroesophageal reflux disease)  No date: Hypertension  2021: Medicare annual wellness visit, subsequent  3/11/2022: Positive blood culture  No date: Sarcoid Past Surgical History:  No date:  SECTION  2024: IR NON-TUNNELED CENTRAL LINE PLACEMENT  2024: IR PICC PLACEMENT SINGLE LUMEN  2024: IR PICC PLACEMENT SINGLE  LUMEN  8/27/2016: MULTIPLE TOOTH EXTRACTIONS; N/A      Comment:  Procedure: Surgical extraction of teeth 2, 18, 19, 30,                31; incision and drainage of left subperiosteal abscess ;               Surgeon: Clara Cevallos DMD;  Location: [facility];                Service:    Current Outpatient Medications   Medication Instructions    acetaminophen (TYLENOL) 650 mg, Oral, Every 4 hours PRN    albuterol (Ventolin HFA) 90 mcg/act inhaler 2 puffs, Inhalation, Every 6 hours PRN    [START ON 7/28/2024] alendronate (FOSAMAX) 70 mg, Oral, Every 7 days    ammonium lactate (LAC-HYDRIN) 12 % cream Topical, 2 times daily    apixaban (ELIQUIS) 5 mg, Oral, 2 times daily    bisacodyl (FLEET) 10 mg, Once    cholecalciferol (VITAMIN D3) 1,000 Units, Oral, Daily    clobetasol (TEMOVATE) 0.05 % cream Topical, 2 times daily    furosemide (LASIX) 20 mg, Oral, Daily    hydroxychloroquine (PLAQUENIL) 200 mg, Oral, 2 times daily    lidocaine (LIDODERM) 5 % 2 patches, Topical, Daily, Remove & Discard patch within 12 hours or as directed by MD    metoprolol tartrate (LOPRESSOR) 25 mg, Oral, Every 12 hours scheduled    miconazole 2 % cream Topical, 2 times daily    nystatin (MYCOSTATIN) powder Topical, 2 times daily    pantoprazole (PROTONIX) 40 mg, Oral, Daily (early morning)    predniSONE 5 mg, Oral, 2 times daily with meals    triamcinolone (KENALOG) 0.1 % cream Topical, 2 times daily, Apply to BUE and BLE topically everyday and evening shift for psoriasis     vancomycin (VANCOCIN) 750 mg, Intravenous, Every 24 hours    Zinc 50 MG TABS 1 tablet, Oral, Daily    Allergies   Allergen Reactions    Shellfish-Derived Products - Food Allergy Itching    Methotrexate Derivatives     Amoxicillin Rash    Ampicillin-Sulbactam Sodium Rash      Social History     Tobacco Use    Smoking status: Never    Smokeless tobacco: Never   Vaping Use    Vaping status: Never Used   Substance Use Topics    Alcohol use: Not Currently    Drug use: No     Family History   Problem Relation Age of Onset    Asthma Mother     Stroke Mother     No Known Problems Father     No Known Problems Sister     No Known Problems Brother     No Known Problems Maternal Grandmother     No Known Problems Maternal Grandfather     No Known Problems Paternal Grandmother     No Known Problems Paternal Grandfather     Diabetes Maternal Aunt     Breast cancer Cousin     Diabetes Cousin     Breast cancer Other         Objective                            Vitals I/O      Most Recent Min/Max in 24hrs   Temp 97.8 °F (36.6 °C) Temp  Min: 97.8 °F (36.6 °C)  Max: 98.1 °F (36.7 °C)   Pulse (!) 138 Pulse  Min: 96  Max: 142   Resp (!) 33 Resp  Min: 28  Max: 41   BP (!) 71/45 BP  Min: 60/39  Max: 138/53   O2 Sat (!) 88 % SpO2  Min: 88 %  Max: 100 %      Intake/Output Summary (Last 24 hours) at 7/21/2024 1702  Last data filed at 7/21/2024 1333  Gross per 24 hour   Intake 2950 ml   Output --   Net 2950 ml       Diet NPO    Invasive Monitoring           Physical Exam   Physical Exam  Vitals reviewed.   Eyes:      General: Vision grossly intact.      Extraocular Movements: Extraocular movements intact.      Conjunctiva/sclera: Conjunctivae normal.      Pupils: Pupils are equal, round, and reactive to light.   Skin:     General: Skin is warm and dry.      Capillary Refill: Capillary refill takes less than 2 seconds.      Findings: Rash present.   HENT:      Head: Normocephalic and atraumatic.      Right Ear: Tympanic membrane normal.      Left Ear: Tympanic membrane normal.      Mouth/Throat:      Mouth: Mucous membranes are moist.   Cardiovascular:      Rate and Rhythm: Tachycardia present. Rhythm irregular.      Pulses: Normal pulses.      Heart sounds: Normal heart sounds.   Abdominal: General: Bowel sounds are normal.      Palpations: Abdomen is soft.   Constitutional:       Appearance: She is well-developed and well-nourished.   Pulmonary:      Effort: Accessory muscle usage and accessory muscle usage  "present.      Breath sounds: Rales present.   Neurological:      General: No focal deficit present.      Mental Status: She is alert and oriented to person, place and time. Mental status is at baseline. She is calm.      Sensory: Sensation is intact.      Motor: gross motor function is at baseline for patient. Strength full and intact in all extremities.        Corneal reflex present and gag reflex intact.            Diagnostic Studies      EKG: Atrial fibrillation with rapid ventricular response  Imaging: CT of the chest abdomen pelvis without contrast demonstrates: \"  1.  No acute findings in the abdomen or pelvis.  2.  Stable appearance of the chest with mild fluid overload and nonspecific right upper lobe groundglass opacities.\"  Chest x-ray done on admission demonstrates fluid in the right fissure otherwise no acute pulmonary disease     Medications:  Scheduled PRN   alteplase, 2 mg, Once  ammonium lactate, , BID  cefepime, 1,000 mg, Q12H  chlorhexidine, 15 mL, Q12H FAMILIA  hydrocortisone sodium succinate, 100 mg, Q12H FAMILIA  hydroxychloroquine, 200 mg, BID  midodrine, 5 mg, TID AC  [START ON 7/22/2024] pantoprazole, 40 mg, Daily Before Breakfast  sodium chloride, 1,000 mL, Once   Followed by  sodium chloride, 1,000 mL, Once   Followed by  sodium chloride, 1,000 mL, Once   Followed by  sodium chloride, 1,000 mL, Once  vancomycin, 15 mg/kg, Q12H      acetaminophen, 650 mg, Q4H PRN  albuterol, 2 puff, Q6H PRN  heparin (porcine), 2,000 Units, Q6H PRN  heparin (porcine), 4,000 Units, Q6H PRN  levalbuterol, 1.25 mg, Q8H PRN  ondansetron, 4 mg, Q4H PRN  trimethobenzamide, 200 mg, Q6H PRN       Continuous    heparin (porcine), 3-20 Units/kg/hr (Order-Specific), Last Rate: 11.1 Units/kg/hr (07/21/24 1600)         Labs:    CBC    Recent Labs     07/21/24  0933 07/21/24  1608   WBC 6.72  --    HGB 10.2*  --    HCT 33.1*  --     156     BMP    Recent Labs     07/21/24  0933   SODIUM 145   K 2.7*   *   CO2 20* "   AGAP 16*   BUN 24   CREATININE 2.58*   CALCIUM 8.7       Coags    Recent Labs     07/21/24  0933   INR 1.83*   PTT 37        Additional Electrolytes  Recent Labs     07/21/24  0933   MG 1.1*          Blood Gas    No recent results  No recent results LFTs  Recent Labs     07/21/24  0933   ALT 23   AST 27   ALKPHOS 46   ALB 2.5*   TBILI 0.60       Infectious  Recent Labs     07/21/24  0933   PROCALCITONI 40.79*     Glucose  Recent Labs     07/21/24  0933   GLUC 67               AZIZA Batista

## 2024-07-21 NOTE — ASSESSMENT & PLAN NOTE
Currently at [facility] for IV antibiotics due to recent MRSA bacteremia  Continue PT OT consultations once clinically stable

## 2024-07-21 NOTE — ASSESSMENT & PLAN NOTE
Possible secondary to osteomyelitis of the spine  Initially presented on 6/6/2024 with lethargy, poor oral intake  IV daptomycin changed to IV Vanco due to rising CPK through 7/22 per ID then starting PO Doxy until inflammatory markers stabilize/normalize  Bracing deferred due to bedbound status, body habitus and underlying skin condition.  Blood cultures pending  Infectious disease consultation placed

## 2024-07-21 NOTE — ED NOTES
"Pt reported having to \"throw up\". Pt was sat up straight. Provider made aware.      Joaquina Ruiz RN  07/21/24 9190    "

## 2024-07-21 NOTE — PLAN OF CARE
Problem: Prexisting or High Potential for Compromised Skin Integrity  Goal: Skin integrity is maintained or improved  Description: INTERVENTIONS:  - Identify patients at risk for skin breakdown  - Assess and monitor skin integrity  - Assess and monitor nutrition and hydration status  - Monitor labs   - Assess for incontinence   - Turn and reposition patient  - Assist with mobility/ambulation  - Relieve pressure over bony prominences  - Avoid friction and shearing  - Provide appropriate hygiene as needed including keeping skin clean and dry  - Evaluate need for skin moisturizer/barrier cream  - Collaborate with interdisciplinary team   - Patient/family teaching  - Consider wound care consult   Outcome: Progressing     Problem: PAIN - ADULT  Goal: Verbalizes/displays adequate comfort level or baseline comfort level  Description: Interventions:  - Encourage patient to monitor pain and request assistance  - Assess pain using appropriate pain scale  - Administer analgesics based on type and severity of pain and evaluate response  - Implement non-pharmacological measures as appropriate and evaluate response  - Consider cultural and social influences on pain and pain management  - Notify physician/advanced practitioner if interventions unsuccessful or patient reports new pain  Outcome: Progressing     Problem: INFECTION - ADULT  Goal: Absence or prevention of progression during hospitalization  Description: INTERVENTIONS:  - Assess and monitor for signs and symptoms of infection  - Monitor lab/diagnostic results  - Monitor all insertion sites, i.e. indwelling lines, tubes, and drains  - Monitor endotracheal if appropriate and nasal secretions for changes in amount and color  - [place] appropriate cooling/warming therapies per order  - Administer medications as ordered  - Instruct and encourage patient and family to use good hand hygiene technique  - Identify and instruct in appropriate isolation precautions for  identified infection/condition  Outcome: Progressing  Goal: Absence of fever/infection during neutropenic period  Description: INTERVENTIONS:  - Monitor WBC    Outcome: Progressing     Problem: SAFETY ADULT  Goal: Patient will remain free of falls  Description: INTERVENTIONS:  - Educate patient/family on patient safety including physical limitations  - Instruct patient to call for assistance with activity   - Consult OT/PT to assist with strengthening/mobility   - Keep Call bell within reach  - Keep bed low and locked with side rails adjusted as appropriate  - Keep care items and personal belongings within reach  - Initiate and maintain comfort rounds  - Make Fall Risk Sign visible to staff  - Apply yellow socks and bracelet for high fall risk patients  - Consider moving patient to room near nurses station  Outcome: Progressing  Goal: Maintain or return to baseline ADL function  Description: INTERVENTIONS:  -  Assess patient's ability to carry out ADLs; assess patient's baseline for ADL function and identify physical deficits which impact ability to perform ADLs (bathing, care of mouth/teeth, toileting, grooming, dressing, etc.)  - Assess/evaluate cause of self-care deficits   - Assess range of motion  - Assess patient's mobility; develop plan if impaired  - Assess patient's need for assistive devices and provide as appropriate  - Encourage maximum independence but intervene and supervise when necessary  - Involve family in performance of ADLs  - Assess for home care needs following discharge   - Consider OT consult to assist with ADL evaluation and planning for discharge  - Provide patient education as appropriate  Outcome: Progressing  Goal: Maintains/Returns to pre admission functional level  Description: INTERVENTIONS:  - Perform AM-PAC 6 Click Basic Mobility/ Daily Activity assessment daily.  - Set and communicate daily mobility goal to care team and patient/family/caregiver.   - Collaborate with rehabilitation  services on mobility goals if consulted  - Out of bed for toileting  - Record patient progress and toleration of activity level   Outcome: Progressing

## 2024-07-21 NOTE — SEPSIS NOTE
"  Sepsis Note   Bhavna Al 74 y.o. female MRN: [id number]  Unit/Bed#: ED-15 Encounter: [id number]       Initial Sepsis Screening       Row Name 07/21/24 1200                Is the patient's history suggestive of a new or worsening infection? Yes (Proceed)  -SG        Suspected source of infection acute abdominal infection;bloodstream catheter infection  -SG        Indicate SIRS criteria Tachycardia > 90 bpm;Tachypnea > 20 resp per min  -SG        Are two or more of the above signs & symptoms of infection both present and new to the patient? Yes (Proceed)  -SG        Assess for evidence of organ dysfunction: Are any of the below criteria present within 6 hours of suspected infection and SIRS criteria that are NOT considered to be chronic conditions? Lactate >/equal 4.0  -SG        Date of presentation of septic shock 07/21/24  -SG        Time of presentation of septic shock 1115  -SG        Fluid Resuscitation: 30 ml/kg IV fluid bolus will be given based on ideal body weight (use if BMI >30)  -SG        Is the patient is persistently hypotensive in the hour after fluid bolus administration? If yes, patient meets criteria for vasopressor use. NO  -SG        Sepsis Note: Click \"NEXT\" below (NOT \"close\") to generate sepsis note based on above information. --                  User Key  (r) = Recorded By, (t) = Taken By, (c) = Cosigned By      Initials Name Provider Type    BARBARA Haynes DO Physician                        There is no height or weight on file to calculate BMI.  Wt Readings from Last 1 Encounters:   07/11/24 108 kg (238 lb)        Ideal body weight: 47.8 kg (105 lb 6.1 oz)  Adjusted ideal body weight: 71.9 kg (158 lb 6.8 oz)    "

## 2024-07-22 ENCOUNTER — APPOINTMENT (INPATIENT)
Dept: NON INVASIVE DIAGNOSTICS | Facility: HOSPITAL | Age: 74
DRG: 871 | End: 2024-07-22
Payer: MEDICARE

## 2024-07-22 PROBLEM — I48.0 PAROXYSMAL ATRIAL FIBRILLATION (HCC): Status: RESOLVED | Noted: 2022-01-10 | Resolved: 2024-07-22

## 2024-07-22 PROBLEM — E87.6 HYPOKALEMIA: Status: RESOLVED | Noted: 2024-07-21 | Resolved: 2024-07-22

## 2024-07-22 PROBLEM — I48.91 ATRIAL FIBRILLATION WITH RVR (HCC): Status: ACTIVE | Noted: 2022-01-10

## 2024-07-22 LAB
ANION GAP SERPL CALCULATED.3IONS-SCNC: 15 MMOL/L (ref 4–13)
ANION GAP SERPL CALCULATED.3IONS-SCNC: 17 MMOL/L (ref 4–13)
ANION GAP SERPL CALCULATED.3IONS-SCNC: 18 MMOL/L (ref 4–13)
ANISOCYTOSIS BLD QL SMEAR: PRESENT
APTT PPP: 117 SECONDS (ref 23–37)
APTT PPP: 64 SECONDS (ref 23–37)
APTT PPP: 66 SECONDS (ref 23–37)
BACTERIA UR QL AUTO: ABNORMAL /HPF
BASE EX.OXY STD BLDV CALC-SCNC: 77.3 % (ref 60–80)
BASE EXCESS BLDV CALC-SCNC: -6 MMOL/L
BASOPHILS # BLD MANUAL: 0 THOUSAND/UL (ref 0–0.1)
BASOPHILS NFR MAR MANUAL: 0 % (ref 0–1)
BILIRUB UR QL STRIP: NEGATIVE
BSA FOR ECHO PROCEDURE: 2.1 M2
BUN SERPL-MCNC: 26 MG/DL (ref 5–25)
BUN SERPL-MCNC: 26 MG/DL (ref 5–25)
BUN SERPL-MCNC: 27 MG/DL (ref 5–25)
BURR CELLS BLD QL SMEAR: PRESENT
CALCIUM SERPL-MCNC: 8.3 MG/DL (ref 8.4–10.2)
CALCIUM SERPL-MCNC: 8.4 MG/DL (ref 8.4–10.2)
CALCIUM SERPL-MCNC: 8.6 MG/DL (ref 8.4–10.2)
CHLORIDE SERPL-SCNC: 114 MMOL/L (ref 96–108)
CLARITY UR: ABNORMAL
CO2 SERPL-SCNC: 15 MMOL/L (ref 21–32)
CO2 SERPL-SCNC: 15 MMOL/L (ref 21–32)
CO2 SERPL-SCNC: 20 MMOL/L (ref 21–32)
COLOR UR: ABNORMAL
CREAT SERPL-MCNC: 2.73 MG/DL (ref 0.6–1.3)
CREAT SERPL-MCNC: 2.77 MG/DL (ref 0.6–1.3)
CREAT SERPL-MCNC: 2.77 MG/DL (ref 0.6–1.3)
EOSINOPHIL # BLD MANUAL: 0 THOUSAND/UL (ref 0–0.4)
EOSINOPHIL NFR BLD MANUAL: 0 % (ref 0–6)
ERYTHROCYTE [DISTWIDTH] IN BLOOD BY AUTOMATED COUNT: 17.5 % (ref 11.6–15.1)
GFR SERPL CREATININE-BSD FRML MDRD: 16 ML/MIN/1.73SQ M
GLUCOSE SERPL-MCNC: 110 MG/DL (ref 65–140)
GLUCOSE SERPL-MCNC: 112 MG/DL (ref 65–140)
GLUCOSE SERPL-MCNC: 120 MG/DL (ref 65–140)
GLUCOSE SERPL-MCNC: 126 MG/DL (ref 65–140)
GLUCOSE SERPL-MCNC: 152 MG/DL (ref 65–140)
GLUCOSE SERPL-MCNC: 46 MG/DL (ref 65–140)
GLUCOSE SERPL-MCNC: 75 MG/DL (ref 65–140)
GLUCOSE SERPL-MCNC: 92 MG/DL (ref 65–140)
GLUCOSE UR STRIP-MCNC: NEGATIVE MG/DL
HCO3 BLDV-SCNC: 19.1 MMOL/L (ref 24–30)
HCT VFR BLD AUTO: 28.1 % (ref 34.8–46.1)
HGB BLD-MCNC: 8.8 G/DL (ref 11.5–15.4)
HGB UR QL STRIP.AUTO: ABNORMAL
HYALINE CASTS #/AREA URNS LPF: ABNORMAL /LPF
KETONES UR STRIP-MCNC: ABNORMAL MG/DL
LACTATE SERPL-SCNC: 2.7 MMOL/L (ref 0.5–2)
LACTATE SERPL-SCNC: 3.1 MMOL/L (ref 0.5–2)
LACTATE SERPL-SCNC: 3.3 MMOL/L (ref 0.5–2)
LACTATE SERPL-SCNC: 3.5 MMOL/L (ref 0.5–2)
LACTATE SERPL-SCNC: 3.7 MMOL/L (ref 0.5–2)
LACTATE SERPL-SCNC: 3.7 MMOL/L (ref 0.5–2)
LEUKOCYTE ESTERASE UR QL STRIP: ABNORMAL
LG PLATELETS BLD QL SMEAR: PRESENT
LYMPHOCYTES # BLD AUTO: 0.28 THOUSAND/UL (ref 0.6–4.47)
LYMPHOCYTES # BLD AUTO: 3 % (ref 14–44)
MAGNESIUM SERPL-MCNC: 1.7 MG/DL (ref 1.9–2.7)
MAGNESIUM SERPL-MCNC: 1.9 MG/DL (ref 1.9–2.7)
MCH RBC QN AUTO: 28.4 PG (ref 26.8–34.3)
MCHC RBC AUTO-ENTMCNC: 31.3 G/DL (ref 31.4–37.4)
MCV RBC AUTO: 91 FL (ref 82–98)
MONOCYTES # BLD AUTO: 0.09 THOUSAND/UL (ref 0–1.22)
MONOCYTES NFR BLD: 1 % (ref 4–12)
MUCOUS THREADS UR QL AUTO: ABNORMAL
NEUTROPHILS # BLD MANUAL: 8.95 THOUSAND/UL (ref 1.85–7.62)
NEUTS BAND NFR BLD MANUAL: 12 % (ref 0–8)
NEUTS SEG NFR BLD AUTO: 84 % (ref 43–75)
NITRITE UR QL STRIP: NEGATIVE
NON-SQ EPI CELLS URNS QL MICRO: ABNORMAL /HPF
O2 CT BLDV-SCNC: 11 ML/DL
OVALOCYTES BLD QL SMEAR: PRESENT
PCO2 BLDV: 36 MM HG (ref 42–50)
PH BLDV: 7.34 [PH] (ref 7.3–7.4)
PH UR STRIP.AUTO: 5 [PH]
PLATELET # BLD AUTO: 187 THOUSANDS/UL (ref 149–390)
PLATELET BLD QL SMEAR: ADEQUATE
PMV BLD AUTO: 11.8 FL (ref 8.9–12.7)
PO2 BLDV: 43 MM HG (ref 35–45)
POIKILOCYTOSIS BLD QL SMEAR: PRESENT
POTASSIUM SERPL-SCNC: 4.1 MMOL/L (ref 3.5–5.3)
POTASSIUM SERPL-SCNC: 4.7 MMOL/L (ref 3.5–5.3)
POTASSIUM SERPL-SCNC: 5.4 MMOL/L (ref 3.5–5.3)
PROCALCITONIN SERPL-MCNC: 36.36 NG/ML
PROT UR STRIP-MCNC: ABNORMAL MG/DL
RA PRESSURE ESTIMATED: 15 MMHG
RBC # BLD AUTO: 3.1 MILLION/UL (ref 3.81–5.12)
RBC #/AREA URNS AUTO: ABNORMAL /HPF
RBC MORPH BLD: PRESENT
RV PSP: 62 MMHG
SL CV LV EF: 75
SODIUM SERPL-SCNC: 146 MMOL/L (ref 135–147)
SODIUM SERPL-SCNC: 147 MMOL/L (ref 135–147)
SODIUM SERPL-SCNC: 149 MMOL/L (ref 135–147)
SP GR UR STRIP.AUTO: 1.01 (ref 1–1.03)
TR MAX PG: 47 MMHG
TR PEAK VELOCITY: 3.4 M/S
TRICUSPID VALVE PEAK REGURGITATION VELOCITY: 3.41 M/S
UROBILINOGEN UR STRIP-ACNC: <2 MG/DL
VANCOMYCIN SERPL-MCNC: 35.3 UG/ML (ref 10–20)
WBC # BLD AUTO: 9.32 THOUSAND/UL (ref 4.31–10.16)
WBC #/AREA URNS AUTO: ABNORMAL /HPF
WBC TOXIC VACUOLES BLD QL SMEAR: PRESENT

## 2024-07-22 PROCEDURE — 93325 DOPPLER ECHO COLOR FLOW MAPG: CPT

## 2024-07-22 PROCEDURE — 93308 TTE F-UP OR LMTD: CPT

## 2024-07-22 PROCEDURE — 83605 ASSAY OF LACTIC ACID: CPT

## 2024-07-22 PROCEDURE — 80048 BASIC METABOLIC PNL TOTAL CA: CPT | Performed by: NURSE PRACTITIONER

## 2024-07-22 PROCEDURE — 93321 DOPPLER ECHO F-UP/LMTD STD: CPT

## 2024-07-22 PROCEDURE — 81001 URINALYSIS AUTO W/SCOPE: CPT | Performed by: INTERNAL MEDICINE

## 2024-07-22 PROCEDURE — 83735 ASSAY OF MAGNESIUM: CPT

## 2024-07-22 PROCEDURE — 85027 COMPLETE CBC AUTOMATED: CPT | Performed by: NURSE PRACTITIONER

## 2024-07-22 PROCEDURE — 87086 URINE CULTURE/COLONY COUNT: CPT | Performed by: INTERNAL MEDICINE

## 2024-07-22 PROCEDURE — 84145 PROCALCITONIN (PCT): CPT | Performed by: INTERNAL MEDICINE

## 2024-07-22 PROCEDURE — 85730 THROMBOPLASTIN TIME PARTIAL: CPT | Performed by: INTERNAL MEDICINE

## 2024-07-22 PROCEDURE — 82948 REAGENT STRIP/BLOOD GLUCOSE: CPT

## 2024-07-22 PROCEDURE — 80048 BASIC METABOLIC PNL TOTAL CA: CPT

## 2024-07-22 PROCEDURE — 93308 TTE F-UP OR LMTD: CPT | Performed by: NURSE PRACTITIONER

## 2024-07-22 PROCEDURE — 85007 BL SMEAR W/DIFF WBC COUNT: CPT | Performed by: NURSE PRACTITIONER

## 2024-07-22 PROCEDURE — 83735 ASSAY OF MAGNESIUM: CPT | Performed by: INTERNAL MEDICINE

## 2024-07-22 PROCEDURE — 99291 CRITICAL CARE FIRST HOUR: CPT | Performed by: INTERNAL MEDICINE

## 2024-07-22 PROCEDURE — 83605 ASSAY OF LACTIC ACID: CPT | Performed by: NURSE PRACTITIONER

## 2024-07-22 PROCEDURE — 99223 1ST HOSP IP/OBS HIGH 75: CPT | Performed by: INTERNAL MEDICINE

## 2024-07-22 PROCEDURE — 80202 ASSAY OF VANCOMYCIN: CPT | Performed by: INTERNAL MEDICINE

## 2024-07-22 RX ORDER — DEXTROSE MONOHYDRATE 25 G/50ML
50 INJECTION, SOLUTION INTRAVENOUS ONCE
Status: COMPLETED | OUTPATIENT
Start: 2024-07-22 | End: 2024-07-22

## 2024-07-22 RX ORDER — PANTOPRAZOLE SODIUM 40 MG/10ML
40 INJECTION, POWDER, LYOPHILIZED, FOR SOLUTION INTRAVENOUS
Status: DISCONTINUED | OUTPATIENT
Start: 2024-07-22 | End: 2024-07-24

## 2024-07-22 RX ORDER — SODIUM CHLORIDE, SODIUM GLUCONATE, SODIUM ACETATE, POTASSIUM CHLORIDE, MAGNESIUM CHLORIDE, SODIUM PHOSPHATE, DIBASIC, AND POTASSIUM PHOSPHATE .53; .5; .37; .037; .03; .012; .00082 G/100ML; G/100ML; G/100ML; G/100ML; G/100ML; G/100ML; G/100ML
500 INJECTION, SOLUTION INTRAVENOUS ONCE
Status: DISCONTINUED | OUTPATIENT
Start: 2024-07-22 | End: 2024-07-22

## 2024-07-22 RX ORDER — MAGNESIUM SULFATE HEPTAHYDRATE 40 MG/ML
2 INJECTION, SOLUTION INTRAVENOUS ONCE
Status: COMPLETED | OUTPATIENT
Start: 2024-07-22 | End: 2024-07-22

## 2024-07-22 RX ORDER — VANCOMYCIN HYDROCHLORIDE 750 MG/150ML
750 INJECTION, SOLUTION INTRAVENOUS DAILY PRN
Status: DISCONTINUED | OUTPATIENT
Start: 2024-07-23 | End: 2024-07-24

## 2024-07-22 RX ADMIN — SODIUM BICARBONATE 100 ML/HR: 84 INJECTION, SOLUTION INTRAVENOUS at 17:59

## 2024-07-22 RX ADMIN — Medication 14 MCG/MIN: at 13:56

## 2024-07-22 RX ADMIN — MAGNESIUM SULFATE HEPTAHYDRATE 2 G: 40 INJECTION, SOLUTION INTRAVENOUS at 17:52

## 2024-07-22 RX ADMIN — CEFEPIME 1000 MG: 1 INJECTION, POWDER, FOR SOLUTION INTRAMUSCULAR; INTRAVENOUS at 10:11

## 2024-07-22 RX ADMIN — HEPARIN SODIUM 8.1 UNITS/KG/HR: 10000 INJECTION, SOLUTION INTRAVENOUS at 00:11

## 2024-07-22 RX ADMIN — CHLORHEXIDINE GLUCONATE 15 ML: 1.2 RINSE ORAL at 08:24

## 2024-07-22 RX ADMIN — VASOPRESSIN 0.04 UNITS/MIN: 20 INJECTION INTRAVENOUS at 00:01

## 2024-07-22 RX ADMIN — Medication 25 MCG/MIN: at 00:02

## 2024-07-22 RX ADMIN — Medication: at 17:52

## 2024-07-22 RX ADMIN — VASOPRESSIN 0.04 UNITS/MIN: 20 INJECTION INTRAVENOUS at 16:00

## 2024-07-22 RX ADMIN — DEXTROSE MONOHYDRATE 50 ML: 25 INJECTION, SOLUTION INTRAVENOUS at 05:10

## 2024-07-22 RX ADMIN — SODIUM BICARBONATE 100 ML/HR: 84 INJECTION, SOLUTION INTRAVENOUS at 11:13

## 2024-07-22 RX ADMIN — POTASSIUM CHLORIDE 20 MEQ: 14.9 INJECTION, SOLUTION INTRAVENOUS at 00:09

## 2024-07-22 RX ADMIN — Medication 5 MCG/MIN: at 19:59

## 2024-07-22 RX ADMIN — HYDROCORTISONE SODIUM SUCCINATE 100 MG: 100 INJECTION, POWDER, FOR SOLUTION INTRAMUSCULAR; INTRAVENOUS at 08:24

## 2024-07-22 RX ADMIN — Medication 20 MCG/MIN: at 10:19

## 2024-07-22 RX ADMIN — HYDROCORTISONE SODIUM SUCCINATE 50 MG: 100 INJECTION, POWDER, FOR SOLUTION INTRAMUSCULAR; INTRAVENOUS at 11:12

## 2024-07-22 RX ADMIN — SODIUM CHLORIDE, SODIUM LACTATE, POTASSIUM CHLORIDE, AND CALCIUM CHLORIDE 500 ML: .6; .31; .03; .02 INJECTION, SOLUTION INTRAVENOUS at 10:05

## 2024-07-22 RX ADMIN — HYDROCORTISONE SODIUM SUCCINATE 50 MG: 100 INJECTION, POWDER, FOR SOLUTION INTRAMUSCULAR; INTRAVENOUS at 17:52

## 2024-07-22 RX ADMIN — HYDROCORTISONE SODIUM SUCCINATE 50 MG: 100 INJECTION, POWDER, FOR SOLUTION INTRAMUSCULAR; INTRAVENOUS at 23:12

## 2024-07-22 RX ADMIN — VASOPRESSIN 0.04 UNITS/MIN: 20 INJECTION INTRAVENOUS at 06:01

## 2024-07-22 RX ADMIN — Medication 22 MCG/MIN: at 02:54

## 2024-07-22 RX ADMIN — PANTOPRAZOLE SODIUM 40 MG: 40 INJECTION, POWDER, FOR SOLUTION INTRAVENOUS at 10:11

## 2024-07-22 RX ADMIN — Medication: at 08:24

## 2024-07-22 RX ADMIN — POTASSIUM CHLORIDE 20 MEQ: 14.9 INJECTION, SOLUTION INTRAVENOUS at 01:54

## 2024-07-22 RX ADMIN — CHLORHEXIDINE GLUCONATE 15 ML: 1.2 RINSE ORAL at 20:21

## 2024-07-22 RX ADMIN — Medication 22 MCG/MIN: at 06:20

## 2024-07-22 RX ADMIN — CEFEPIME 1000 MG: 1 INJECTION, POWDER, FOR SOLUTION INTRAMUSCULAR; INTRAVENOUS at 21:44

## 2024-07-22 NOTE — CASE MANAGEMENT
Case Management Assessment & Discharge Planning Note    Patient name Bhavna Al  Location ICU 02/ICU 02 MRN [id number]  : 1950 Date 2024       Current Admission Date: 2024  Current Admission Diagnosis:Sepsis ([facility])   Patient Active Problem List    Diagnosis Date Noted Date Diagnosed    Sepsis ([facility]) 2024     AMANDA (acute kidney injury) ([facility]) 2024     Chronic pruritic rash in adult 2024     Secondary adrenal insufficiency ([facility]) 2024     Pressure ulcer of left buttock, stage 3 ([facility]) 2024     Acute osteomyelitis of thoracic spine ([facility]) 2024     MRSA bacteremia 2024     Hypotension 2024     Dysphagia 2024     Deep tissue injury 2024     Hypernatremia 2024     Localized swelling on left hand 2023     Stage 3 chronic kidney disease ([facility]) 2023     Febrile illness 2023     Dermatitis associated with incontinence 2023     Right shoulder pain 12/15/2022     Abnormal urinalysis 2022     Ambulatory dysfunction 2022     Hyperuricemia 2022     Acute metabolic encephalopathy 2022     Acute on chronic diastolic heart failure, LVEF 65%, LVIDd 3.3 cm, AHA Stage C 2022     Acute bronchitis 2022     Assistance needed with transportation 09/15/2022     Abnormal CT of the chest 2022     Gram-positive cocci bacteremia 2022     Psoriasis 2022     Elevated troponin 2022     COVID-19 2022     Shock ([facility]) 01/10/2022     Persistent atrial fibrillation with RVR ([facility]) 01/10/2022     Hyperparathyroidism ([facility]) 2021     Murmur, cardiac 2021     BPPV (benign paroxysmal positional vertigo) 2018     Seasonal allergic rhinitis due to pollen 2018     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     Osteoporosis 2016     SIRS (systemic inflammatory response syndrome) ([facility]) 2016     Leukopenia 2016     Hypoalbuminemia 2016      Rheumatoid arthritis (HCC) 08/23/2016     GERD (gastroesophageal reflux disease) 08/23/2016     Sarcoid 08/23/2016     Morbid obesity (HCC) 07/12/2016     Vitamin D deficiency 07/16/2015     Essential hypertension 12/04/2014     Lumbar radiculopathy 12/04/2014       LOS (days): 1  Geometric Mean LOS (GMLOS) (days): 5.1  Days to GMLOS:3.9     OBJECTIVE:  PATIENT READMITTED TO HOSPITAL  Risk of Unplanned Readmission Score: 36.75       Current admission status: Inpatient     Preferred Pharmacy:   [id number] - [place], [place] - [address]  [address]  [place] [address]  Phone: [phone] Fax: [phone]    [facility] - [place], [place] - [address] [place] [address]  [address] [facility] [address]  Phone: [phone] Fax: [phone]    Primary Care Provider: Nya Taveras DO    Primary Insurance: MEDICARE  Secondary Insurance: [facility]    ASSESSMENT:  Active Health Care Proxies       [facility] - Child   Primary Phone: [phone] (Home)                 Patient Information  What city do you live in?: [place]  Type of Current Residence: Facility  Living Arrangements: Other (Comment)  Is patient a ?: No       Social Determinants of Health (SDOH)      Flowsheet Row Most Recent Value   Housing Stability    In the last 12 months, was there a time when you were not able to pay the mortgage or rent on time? N   In the past 12 months, how many times have you moved where you were living? 1   At any time in the past 12 months, were you homeless or living in a shelter (including now)? N   Transportation Needs    In the past 12 months, has lack of transportation kept you from medical appointments or from getting medications? no   In the past 12 months, has lack of transportation kept you from meetings, work, or from getting things needed for daily living? No   Food Insecurity    Within the past 12 months, you worried  that your food would run out before you got the money to buy more. Never true   Within the past 12 months, the food you bought just didn't last and you didn't have money to get more. Never true   Utilities    In the past 12 months has the electric, gas, oil, or water company threatened to shut off services in your home? No            DISCHARGE DETAILS:

## 2024-07-22 NOTE — CONSULTS
Consultation - Infectious Disease   Bhavna JANUSZ Al 74 y.o. female MRN: [id number]  Unit/Bed#: ICU 02 Encounter: [id number]      IMPRESSION & RECOMMENDATIONS:   1.  Shock.  Undefined etiology at this point but must consider the possibility of septic shock.  Consideration for the possibility of a catheter related bacteremia and the PICC line has been removed.  Consideration for possible UTI.  Vasopressor support has slightly been decreased and she continues to receive volume resuscitation.  Consideration for the possibility urinary source with the pyuria.  Consideration for the possibly adrenal insufficiency on this patient with a chronic low-dose steroids being used.  Procalcitonin level was quite high.  CT chest abdomen pelvis without a definitive source  -Continue dose adjusted intravenous cefepime  -Follow-up blood cultures and urine cultures and adjust the antibiotics as needed  -Stress dose steroids  -Recheck CBC with differential and CMP to make sure no developing toxicities  -Supportive care    2.  MRSA bacteremia.  Undefined primary source but with a chronic rash with some areas of skin breakdown which could be playing a role.  Complicated by thoracic vertebral osteomyelitis as below.  Patient previously cleared her bacteremia and a transthoracic echocardiogram that was without valvular vegetations.  CT of the chest abdomen pelvis without any new complications  -Continue vancomycin for now awaiting follow-up blood cultures  -Follow-up repeat blood cultures  -Recheck transthoracic echocardiogram  -Additional workup as needed    3.  Thoracic vertebral osteomyelitis.  Imaging of the spine in the setting of back pain revealed T9-10 discitis/vertebral osteomyelitis. Likely staphylococcal based on the previous blood culture results.  Sedimentation rate and CRP have been decreasing.  Pain has been improving.  -Continue vancomycin as above for now  -Will eventually transition to oral doxycycline to hold sedimentation  rate normalizes or stabilizes  -Recheck sedimentation rate and CRP once patient more stabilized    4.  Acute kidney injury.  Most likely prerenal issue.  Consideration for the possibility of ATN.  -Dose adjusted antibiotics  -Recheck BMP  -Volume management    5.  Deep tissue injury.  Not overtly infected.  -Offloading and local wound care    6.  Rheumatoid arthritis.  On Plaquenil and prednisone.    7.  Psoriasis.  On topical therapy but needs dermatology follow-up    Discussed with the critical care service the plan to continue the vancomycin and cefepime for now and they agree with the plan    Extensive review of the medical records in epic including review of the notes, radiographs, and laboratory results     HISTORY OF PRESENT ILLNESS:  Reason for Consult: Recurrent septic shock  HPI: Bhavna Al is a 74 y.o. year old female with rheumatoid arthritis on low-dose Plaquenil and chronic prednisone, psoriasis, sarcoidosis, chronic kidney disease, atrial fibrillation, congestive heart failure, and recurrent sepsis most recently related to MRSA and coagulase-negative Staphylococcus bacteremia complicated by thoracic discitis/vertebral osteomyelitis who we are asked to assist with management of new onset septic shock.  The patient has had several hospitalizations recently for encephalopathy with sepsis in the setting of gram-positive bacteremia and thoracic vertebral osteomyelitis.  She had been admitted earlier in the year for MSSA and Staphylococcus lugdunensis bacteremia and was treated with a prolonged course of intravenous antibiotics.  She was subsequently admitted with MRSA bacteremia but also had a coagulase-negative staph isolated in the blood cultures with clearance of the bacteremia and echocardiogram without valvular vegetation.  Imaging of the spine in the setting of back pain revealed T9-10 discitis/vertebral osteomyelitis.  She was to complete a 6-week course of intravenous antibiotics through  7/22/2024.  She was readmitted with acute hypoxic respiratory failure related to volume issues and acute encephalopathy due to metabolic derangements.  She also acute kidney injury at that time.  Of note at one point she has been transition from vancomycin to daptomycin but her CPK elevated and therefore she was changed back to vancomycin.  She was seen by me approximately 11 days ago in the office and had been doing well with her intravenous antibiotics.  However yesterday she presented back to the hospital with abdominal pain decreased oral intake and hypotension.  She was found to have acute kidney injury, lactic acidosis, and persistent hypotension was admitted to the ICU for resuscitation and supportive care.  She has been requiring vasopressor support.  Blood cultures were obtained and cefepime was added to her treatment regimen.  She had a urinalysis obtained well after the antibiotics were started which revealed pyuria and urine cultures pending.  She is awake but has trouble giving any meaningful history which is similar to her prior hospitalizations.  She is not spiking any high fever and does not have leukocytosis but she is tachypneic, tachycardic, and hypotensive.  She denies any localizing symptoms.  She is not having any reported diarrhea or vomiting.  Currently she does not tell me that she is having any abdominal pain.    REVIEW OF SYSTEMS:  A complete review of systems is negative other than that noted in the HPI.    PAST MEDICAL HISTORY:  Past Medical History:   Diagnosis Date    Abnormal thyroid function test     last assessed: Sept 25, 2015     Arthritis     Caries     last assessed: Sept 9, 2016     Continuous opioid dependence (HCC) 12/2/2021    Edema of right lower extremity     last assessed: March 18, 2015     GERD (gastroesophageal reflux disease)     Hypertension     Medicare annual wellness visit, subsequent 12/2/2021    Positive blood culture 3/11/2022    Sarcoid      Past Surgical  History:   Procedure Laterality Date     SECTION      IR NON-TUNNELED CENTRAL LINE PLACEMENT  2024    IR PICC PLACEMENT SINGLE LUMEN  2024    IR PICC PLACEMENT SINGLE LUMEN  2024    MULTIPLE TOOTH EXTRACTIONS N/A 2016    Procedure: Surgical extraction of teeth 2, 18, 19, 30, 31; incision and drainage of left subperiosteal abscess ;  Surgeon: Clara Cevallos DMD;  Location: [facility];  Service:        FAMILY HISTORY:  Non-contributory    SOCIAL HISTORY:  Social History   Social History     Substance and Sexual Activity   Alcohol Use Not Currently     Social History     Substance and Sexual Activity   Drug Use No     Social History     Tobacco Use   Smoking Status Never   Smokeless Tobacco Never       ALLERGIES:  Allergies   Allergen Reactions    Shellfish-Derived Products - Food Allergy Itching    Methotrexate Derivatives     Amoxicillin Rash    Ampicillin-Sulbactam Sodium Rash       MEDICATIONS:  All current active medications have been reviewed.  Antibiotics: Vancomycin 22, antibiotics 45    PHYSICAL EXAM:  Temp:  [96.7 °F (35.9 °C)-99.2 °F (37.3 °C)] 98.7 °F (37.1 °C)  HR:  [] 92  Resp:  [27-48] 38  BP: ()/(35-62) 89/59  SpO2:  [88 %-100 %] 99 %  Temp (24hrs), Av.9 °F (36.6 °C), Min:96.7 °F (35.9 °C), Max:99.2 °F (37.3 °C)  Current: Temperature: 98.7 °F (37.1 °C)    Intake/Output Summary (Last 24 hours) at 2024 1144  Last data filed at 2024 1134  Gross per 24 hour   Intake 4569.37 ml   Output 675 ml   Net 3894.37 ml       General Appearance:  Chronically ill, debilitated, nontoxic, and in no distress   Head:  Normocephalic, without obvious abnormality, atraumatic   Eyes:  Conjunctiva pale and sclera anicteric, both eyes   Nose: Nares normal, mucosa normal, no drainage   Throat: Oropharynx moist without lesions   Neck: Supple, symmetrical, no adenopathy, no tenderness/mass/nodules   Back:   Symmetric, no curvature, ROM normal, no CVA tenderness   Lungs:    Decreased breath sounds bilaterally, respirations unlabored   Chest Wall:  No tenderness or deformity   Heart:  RRR; no murmur, rub or gallop   Abdomen:   Soft, non-tender, non-distended, positive bowel sounds    Extremities: No cyanosis, clubbing or edema   Skin: Improved scaling rash.   Lymph nodes: Cervical, supraclavicular nodes normal   Neurologic: Alert and oriented times 3, extremity strength 5/5 and symmetric       LABS, IMAGING, & OTHER STUDIES:  Lab Results:  I have personally reviewed pertinent labs.  Results from last 7 days   Lab Units 07/22/24  0426 07/21/24  1608 07/21/24  0933   WBC Thousand/uL 9.32  --  6.72   HEMOGLOBIN g/dL 8.8*  --  10.2*   PLATELETS Thousands/uL 187 156 191     Results from last 7 days   Lab Units 07/22/24  0543 07/22/24  0426 07/21/24  1818 07/21/24  0933   SODIUM mmol/L 146 147 148* 145   POTASSIUM mmol/L 4.7 5.4* 3.0* 2.7*   CHLORIDE mmol/L 114* 114* 112* 109*   CO2 mmol/L 15* 15* 18* 20*   BUN mg/dL 26* 26* 25 24   CREATININE mg/dL 2.77* 2.77* 2.74* 2.58*   EGFR ml/min/1.73sq m 16 16 16 17   CALCIUM mg/dL 8.4 8.6 8.7 8.7   AST U/L  --   --   --  27   ALT U/L  --   --   --  23   ALK PHOS U/L  --   --   --  46     Results from last 7 days   Lab Units 07/21/24  0933   BLOOD CULTURE  Received in Microbiology Lab. Culture in Progress.  Received in Microbiology Lab. Culture in Progress.     Results from last 7 days   Lab Units 07/22/24  0426 07/21/24  0933   PROCALCITONIN ng/ml 36.36* 40.79*                   Imaging Studies:     Chest x-ray.  Low lung volumes.  No pneumothorax.    CT chest abdomen pelvis.  No acute abnormality in the abdominal pelvic region.  Lung fields with mild fluid overload with nonspecific groundglass changes but no focal consolidation    Images personally reviewed by me in PACS and interpreted by me

## 2024-07-22 NOTE — ASSESSMENT & PLAN NOTE
Home med is lopressor 25 mg BID, currently on hold  Admit to the intensive care unit  Cardiology consulted  Continuous cardiopulmonary monitor

## 2024-07-22 NOTE — QUICK NOTE
Sepsis alert was received for this AM. The patient was admitted with sepsis, is currently on vasoprssor therapy and antibiotics for presumed infection.     The patient is already being treated appropriately. We will tailor antibiotics based on the results of her cultures

## 2024-07-22 NOTE — ASSESSMENT & PLAN NOTE
Recent Labs     07/21/24  1818 07/22/24  0426 07/22/24  0543   CREATININE 2.74* 2.77* 2.77*   EGFR 16 16 16     Creatinine baseline is 1-1.2  Monitor kidney indices  Avoid nephrotoxic medications  Currently stable

## 2024-07-22 NOTE — ASSESSMENT & PLAN NOTE
At [place] was taking lasix 20 mg QD & Lopressor 25 mg q12h, these are on hold.  On Vasopressin and Norepi & midodrine 5 mg TID

## 2024-07-22 NOTE — ASSESSMENT & PLAN NOTE
Lab Results   Component Value Date    HSTNI0 1,374 (H) 07/21/2024    HSTNI2 1,239 (H) 07/21/2024    HSTNID2 -135 07/21/2024    HSTNI4 364 (H) 06/29/2024    HSTNID4 84 (H) 06/29/2024     Cardiology consulted  Continue to trend troponins  Continuous cardiopulmonary monitoring

## 2024-07-22 NOTE — ASSESSMENT & PLAN NOTE
Recent Labs     07/21/24  0933 07/22/24  0426   PROCALCITONI 40.79* 36.36*     Recent Labs     07/21/24  0933   BLOODCX Received in Microbiology Lab. Culture in Progress.  Received in Microbiology Lab. Culture in Progress.     Undifferentiated  Continue cefepime and stress dose steroid.  Vanco level is above goal so she will not need vancomycin at this moment.  Infectious disease on board, appreciate recommendations  Volume resuscitation completed  Follow lactic acid  Follow blood culture results

## 2024-07-22 NOTE — ASSESSMENT & PLAN NOTE
Has been on Lopressor for many years. Most recent dose was 25 mg q12h. This is on hold given hypotension.  Received IV Digoxin 250 mcg yesterday. Start maintenance dose today via IV route (she is NPO), Digoxin 125 mcg QD and monitor rates on telemetry  Ensure K > 4 & Mag > 2  CHADSvasc 5. Subbed home Eliquis 5 mg BID for heparin gtt for stroke prevention.

## 2024-07-22 NOTE — PROCEDURES
Central Line Insertion    Date/Time: 7/21/2024 11:34 PM    Performed by: AZIZA Londono  Authorized by: AZIZA Londono    Patient location:  Bedside and ICU  Consent:     Consent obtained:  Verbal (via telephone)    Consent given by:  Healthcare agent (Rhoda)    Risks discussed:  Incorrect placement, pneumothorax, bleeding, infection and arterial puncture    Alternatives discussed:  Delayed treatment  [place] protocol:     Radiology Images displayed and confirmed.  If images not available, report reviewed: yes      Required blood products, implants, devices, and special equipment available: yes      Site/side marked: yes      Immediately prior to procedure, a time out was called: yes      Patient identity confirmed:  Verbally with patient, hospital-assigned identification number and arm band  Pre-procedure details:     Hand hygiene: Hand hygiene performed prior to insertion      Sterile barrier technique: All elements of maximal sterile technique followed      Skin preparation:  ChloraPrep    Skin preparation agent: Skin preparation agent completely dried prior to procedure    Indications:     Central line indications: medications requiring central line    Anesthesia (see MAR for exact dosages):     Anesthesia method:  Local infiltration    Local anesthetic:  Lidocaine 1% w/o epi  Procedure details:     Location:  Right internal jugular    Vessel type: vein      Laterality:  Right    Approach: percutaneous technique used      Patient position:  Flat    Catheter type:  Triple lumen 16cm    Catheter size:  7 Fr    Landmarks identified: yes      Ultrasound guidance: yes      Ultrasound image availability:  Images available in PACS    Sterile ultrasound techniques: Sterile gel and sterile probe covers were used      Manometry confirmation: yes      Number of attempts:  1    Successful placement: yes      Catheter tip vessel location: atriocaval junction    Post-procedure details:      Post-procedure:  Dressing applied and line sutured    Assessment:  No pneumothorax on x-ray, placement verified by x-ray, free fluid flow and blood return through all ports    Post-procedure complications: none      Patient tolerance of procedure:  Tolerated well, no immediate complications

## 2024-07-22 NOTE — DISCHARGE INSTR - OTHER ORDERS
Wound Care Plan:   1-Apply silicone bordered foam dressings to bilateral heels for prevention.  Change dressings every 3 days and as needed.  2-Elevate heels off of bed/chair surface to offload pressure.  3-Offloading air cushion in chair when out of bed, if able.  4-Apply lotion to body twice daily and as needed.  5-Turn/reposition every 2 hours while in bed and weight shift frequently while in chair for pressure re-distribution on skin.   6-Buttock/sacrum/posterior thighs--cleanse with soap and water, pat dry.  Apply Zinc Oxide/Calazime paste three times daily and as needed with incontinence care.  7-Bilateral breast and right medial thigh creases--cleanse with soap and water, pat dry.  Apply Interdry in single layer to skin fold, ensure approximately 2 inches of fabric is visible outside of fold.  Remove Interdry daily for bathing and re-apply.  Change Interdry sheet every other day or if visibly soiled.  DO NOT USE CREAMS OR POWDERS IN AREAS WHERE INTERDRY IS IN USE.

## 2024-07-22 NOTE — PROGRESS NOTES
[facility]  Interval Progress Note: Critical Care  Name: Bhavna Al I  MRN: [id number]  Unit/Bed#: ICU 02 I Date of Admission: 7/21/2024   Date of Service: 7/21/2024 I Hospital Day: 0    Interval Events:  Patient admitted earlier today with shock and suspicion for sepsis.  She has recent hx MRSA bacteremia 2/2 thoracic osteomyelitis.  She was in STR for continue IV antibiotics via PICC and deconditioning. Her PICC was noted to be occluded and CathFlow was ordered and given at 17:28.  She received IVF resuscitation and continued to have refractory hypotension.  She was started on norepinephrine and admitted to the ICU.  Since arriving in the ICU her vasopressor requirement has continued to climb.       Pertinent New Data:   , RR 30, BP 89/59 (68), SpO2 98% on 2 L NC.  Arterial Line  [place] BP 95/44  Arterial Line BP  Min: 95/44  Max: 95/44   MAP (!) 61 mmHg  Arterial Line MAP (mmHg)  Min: 61 mmHg  Max: 61 mmHg     Physical Exam  Vitals and nursing note reviewed.   Eyes:      General: Vision grossly intact. No visual field deficit or scleral icterus.        Right eye: No discharge.         Left eye: No discharge.      Extraocular Movements: Extraocular movements intact.      Conjunctiva/sclera: Conjunctivae normal.      Pupils: Pupils are equal, round, and reactive to light.   Skin:     General: Skin is warm, dry and not mottled extremities.      Capillary Refill: Capillary refill takes less than 2 seconds.      Coloration: Skin is not jaundiced or pale.      Findings: Erythema and rash present. No lesion or wound.      Comments: Diffuse erythema -- confirmed psoriasis on biopsy   HENT:      Head: Normocephalic and atraumatic.      Right Ear: Hearing normal. No drainage.      Left Ear: Hearing normal. No drainage.      Nose: No nasal deformity, nasal tenderness, congestion, rhinorrhea, epistaxis or nasogastric tube.      Mouth/Throat:      Mouth: Mucous membranes are moist. No injury  or angioedema.      Dentition: Normal dentition.      Pharynx: No oropharyngeal exudate.   Neck:      Vascular: No JVD.   no midline tenderness Cardiovascular:      Rate and Rhythm: Tachycardia present. Rhythm irregular.      Pulses: Decreased pulses.      Heart sounds: No murmur heard.     No friction rub. No gallop.   Musculoskeletal:         General: No swelling, tenderness, deformity or signs of injury. Normal range of motion.      Right lower leg: Trace Edema present.      Left lower leg: Trace Edema present.   Abdominal: General: Abdomen is protuberant. Bowel sounds are decreased. There is no distension.      Palpations: Abdomen is soft.      Tenderness: There is no abdominal tenderness.   Constitutional:       General: She is not in acute distress.     Appearance: She is well-developed and well-nourished. She is ill-appearing and toxic-appearing.      Interventions: She is not sedated, not intubated and not restrained.  Pulmonary:      Effort: Tachypnea present. No accessory muscle usage, respiratory distress or accessory muscle usage. She is not intubated.      Breath sounds: No wheezing, rhonchi or rales.   Psychiatric:         Mood and Affect:  mood and affect abnormal.  Neurological:      Mental Status: She is lethargic.      GCS: GCS eye subscore is 3. GCS verbal subscore is 4. GCS motor subscore is 6.      Cranial Nerves: Cranial nerves 2-12 are intact. No facial asymmetry.      Sensory: Sensation is intact.   Genitourinary/Anorectal:  Corey present.       I have personally reviewed pertinent lab results., CBC:   Lab Results   Component Value Date    WBC 6.72 07/21/2024    HGB 10.2 (L) 07/21/2024    HCT 33.1 (L) 07/21/2024    MCV 91 07/21/2024     07/21/2024    RBC 3.62 (L) 07/21/2024    MCH 28.2 07/21/2024    MCHC 30.8 (L) 07/21/2024    RDW 17.2 (H) 07/21/2024    MPV 12.9 (H) 07/21/2024    NRBC 0 07/21/2024   , CMP:   Lab Results   Component Value Date    SODIUM 148 (H) 07/21/2024    K 3.0 (L)  "07/21/2024     (H) 07/21/2024    CO2 18 (L) 07/21/2024    BUN 25 07/21/2024    CREATININE 2.74 (H) 07/21/2024    CALCIUM 8.7 07/21/2024    AST 27 07/21/2024    ALT 23 07/21/2024    ALKPHOS 46 07/21/2024    EGFR 16 07/21/2024   , ABG: No results found for: \"PHART\", \"QLS7BME\", \"PO2ART\", \"GWJ3OXV\", \"U5ZAMNAC\", \"BEART\", \"SOURCE\", PT/INR:   Lab Results   Component Value Date    INR 1.83 (H) 07/21/2024   , Troponin: No results found for: \"TROPONINI\", Magnesium: No components found for: \"MAG\", Phosphorous:   Lab Results   Component Value Date    PHOS 2.9 07/21/2024     chest xrayI have personally reviewed pertinent reports.   and I have personally reviewed pertinent films in PACS      Assessment and Plan  Shock, suspected sepsis, though cannot rule out cardiogenic component in setting of Afib RVR and hx HFpEF with G2DD, vs hypovolemia  Placed arterial line, correlating with BP cuff  Continue Levophed, titrate for goal MAP > 65 mmHg  Spoke to patient's daughter, Rhoda, obtained consent for CVC placement  Once CVC placed, discontinue PICC -- unclear source of infection  Start vasopressin 0.04 units/min  Continue stress dose steroids  Was started on Vancomycin and cefepime, will continue  Follow cultures  ID consulted, appreciate recommendations  Check VBG   Repeat Lactic acid   Monitor UO and end points    Plan discussed with on-call longterm attending via Epic Secure Chat.    Billing Level:  Critical Care Time Statement: Upon my evaluation, this patient had a high probability of imminent or life-threatening deterioration due to shock, which required my direct attention, intervention, and personal management.  I spent a total of 20 minutes directly providing critical care services, including interpretation of complex medical databases, evaluating for the presence of life-threatening injuries or illnesses, management of organ system failure(s) , complex medical decision making (to support/prevent further life-threatening " deterioration)., interpretation of hemodynamic data, and titration of vasoactive medications. This time is exclusive of procedures, teaching, family meetings, and any prior time recorded by providers other than myself.      SIGNATURE: AZIZA Londono

## 2024-07-22 NOTE — CONSULTS
[facility]  Consult  Name: Bhavna Al 74 y.o. female I MRN: [id number]  Unit/Bed#: ICU 02 I Date of Admission: 7/21/2024   Date of Service: 7/22/2024 I Hospital Day: 1    Inpatient consult to Cardiology  Consult performed by: Rosette Pablo PA-C  Consult ordered by: Jeevan Haynes, DO      Assessment & Plan   Persistent atrial fibrillation with RVR (HCC)  Has been on Lopressor for many years. Most recent dose was 25 mg q12h. This is on hold given hypotension.  Received IV Digoxin 250 mcg yesterday. Start maintenance dose today via IV route (she is NPO), Digoxin 62.5 mcg QD and monitor rates on telemetry. Check Dig level in 2 days.  Ensure K > 4 & Mag > 2  CHADSvasc 5. Subbed home Eliquis 5 mg BID for heparin gtt for stroke prevention.    Hypotension  At [place] was taking lasix 20 mg QD & Lopressor 25 mg q12h, these are on hold.  On Vasopressin and Norepi & midodrine 5 mg TID  R/o endocarditis as etiology for undifferentiated shock with limited TTE    Acute on chronic diastolic heart failure, LVEF 65%, LVIDd 3.3 cm, AHA Stage C  LVEF 65%, probable diastolic dysfunction, severe LA dilatation, moderate MAC, trace MR/TR, June 2024   Pulmonary edema likely due to Afib RVR  At [place] was Rx Lasix 20 mg QD  Hold on further IV diuresis for now and monitor for renal recovery    Elevated troponin  Trops trended 1374- 1239  No ischemic changes on EKG last night but Afib RVR rate somewhat masks the ST segment. This morning no ischemic changes on the telemetry.  Possibly troponin elevation is due to shock state, Afib RVR and AMANDA on CKD.  Ordered limited echo to check WMA now that Rate is better controlled  She is on heparin gtt also in part due to CHADSvasc 5 & being unable to take her PO Eliquis, continue the heparin while NPO    Hypoalbuminemia  Albumin level 2.5  Would replete    History Of Present Illness:  Bhavna Al is a 74 year old with PMH as below who presented to [facility] ED on 7/21 by EMS  "from [facility] due to finding of hypotension at the facility (BP for the EMS was as low as 68/24 mmHg). Patient was unable to sit, stand or walk without assistance & had lethargy x 1 day.  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  She encountered a cardiologist during that hospitalization for A-fib RVR and was resumed on her home medications with control of the heart rate.  She had AMANDA during that hospitalization from IV diuretic use.  CT chest showed mild trace fluid and pleural effusions bilateral lungs so she was resumed on p.o. diuretic upon discharge.  Patient herself is unable to give history to me today however on my chart review I learned she has had poor p.o. intake over the weekend and lethargy and generalized weakness at the nursing home which prompted them to call EMS.  She had hypotension initially treated with IV fluids then respiratory failure requiring BiPAP.  Was treated for fluid overload yesterday afternoon with IV diuretic one-time and her hypotension worsened so she was transferred to the ICU for pressors.  This morning patient is awake but does not answer my questions with a verbal response only shakes her head yes or no a few times.  In this manner the patient denies chest pain or shortness of breath.    Rhythm History: Afib with RVR, NSR, PVCs    Primary Cardiologist: She has seen a cardiologist in the hospital but does not follow with a cardiologist as OP    ROS:  Review of Systems   Respiratory:  Positive for cough.         When I ask the patient if she is struggling to breathe she shakes her head \"no\"   Cardiovascular:  Negative for chest pain.   Gastrointestinal:  Positive for vomiting.        Last night vomiting   Skin:  Positive for wound.        Pressure wounds   Hematological:  Bruises/bleeds easily.        On eliquis   Psychiatric/Behavioral:          + lethargy      Past Medical History:        Past Medical History:   Diagnosis " Date    Abnormal thyroid function test     last assessed: 2015     Arthritis     Caries     last assessed: 2016     Continuous opioid dependence (HCC) 2021    Edema of right lower extremity     last assessed: 2015     GERD (gastroesophageal reflux disease)     Hypertension     Medicare annual wellness visit, subsequent 2021    Positive blood culture 3/11/2022    Sarcoid       Past Surgical History:   Procedure Laterality Date     SECTION      IR NON-TUNNELED CENTRAL LINE PLACEMENT  2024    IR PICC PLACEMENT SINGLE LUMEN  2024    IR PICC PLACEMENT SINGLE LUMEN  2024    MULTIPLE TOOTH EXTRACTIONS N/A 2016    Procedure: Surgical extraction of teeth 2, 18, 19, 30, 31; incision and drainage of left subperiosteal abscess ;  Surgeon: Clara Cevallos DMD;  Location: [facility];  Service:      Family History:     Family History   Problem Relation Age of Onset    Asthma Mother     Stroke Mother     No Known Problems Father     No Known Problems Sister     No Known Problems Brother     No Known Problems Maternal Grandmother     No Known Problems Maternal Grandfather     No Known Problems Paternal Grandmother     No Known Problems Paternal Grandfather     Diabetes Maternal Aunt     Breast cancer Cousin     Diabetes Cousin     Breast cancer Other      Social History:       Smoker: never  Alcohol: never  Drugs: none  Living situation: [facility]    Allergy:        Allergies   Allergen Reactions    Shellfish-Derived Products - Food Allergy Itching    Methotrexate Derivatives     Amoxicillin Rash    Ampicillin-Sulbactam Sodium Rash     Medications:       Prior to Admission medications    Medication Sig Start Date End Date Taking? Authorizing Provider   ammonium lactate (LAC-HYDRIN) 12 % cream Apply topically 2 (two) times a day 24  Yes Genie Abbasi MD   acetaminophen (TYLENOL) 325 mg tablet Take 2 tablets (650 mg total) by mouth every 4  (four) hours as needed for mild pain, headaches or fever.  Patient taking differently: Take 650 mg by mouth every 6 (six) hours as needed for mild pain, headaches or fever 8/29/16   AZIZA Hyatt   albuterol (Ventolin HFA) 90 mcg/act inhaler Inhale 2 puffs every 6 (six) hours as needed for wheezing 11/16/22   Nya Taveras DO   alendronate (FOSAMAX) 70 mg tablet Take 70 mg by mouth every 7 days Do not start before July 28, 2024. 7/28/24   Historical Provider, MD   apixaban (ELIQUIS) 5 mg Take 1 tablet (5 mg total) by mouth 2 (two) times a day 11/10/22   Nya Taveras DO   bisacodyl (FLEET) 10 MG/30ML ENEM Insert 10 mg into the rectum once  Patient not taking: Reported on 7/17/2024    Historical Provider, MD   cholecalciferol (VITAMIN D3) 1,000 units tablet Take 1 tablet (1,000 Units total) by mouth daily 11/10/22   Nya Taveras DO   clobetasol (TEMOVATE) 0.05 % cream Apply topically 2 (two) times a day 6/1/23   AZIZA Hyatt   furosemide (LASIX) 20 mg tablet Take 1 tablet (20 mg total) by mouth daily 7/6/24 8/5/24  Genie Abbasi MD   hydroxychloroquine (PLAQUENIL) 200 mg tablet Take 1 tablet (200 mg total) by mouth 2 (two) times a day 11/10/22 6/27/24  Nya Taveras DO   lidocaine (LIDODERM) 5 % Apply 2 patches topically daily Remove & Discard patch within 12 hours or as directed by MD Do not start before December 20, 2022.  Patient taking differently: Apply 2 patches topically daily Remove & Discard patch within 12 hours or as directed by MD 12/20/22   Ann-Marie Connell DO   metoprolol tartrate (LOPRESSOR) 25 mg tablet Take 1 tablet (25 mg total) by mouth every 12 (twelve) hours 11/10/22   Nya Taveras DO   miconazole 2 % cream Apply topically 2 (two) times a day 6/18/24   Jason Quach MD   nystatin (MYCOSTATIN) powder Apply topically 2 (two) times a day 11/25/22   Federico Piña MD   pantoprazole (PROTONIX) 40 mg tablet Take 1 tablet (40 mg total) by mouth  daily in the early morning 6/19/24   Jason Quach MD   predniSONE 5 mg tablet Take 1 tablet (5 mg total) by mouth 2 (two) times a day with meals Do not start before June 5, 2023. 6/5/23   AZIZA Hyatt   triamcinolone (KENALOG) 0.1 % cream Apply topically 2 (two) times a day Apply to BUE and BLE topically everyday and evening shift for psoriasis    Historical Provider, MD   vancomycin (VANCOCIN) Inject 150 mL (750 mg total) into a catheter in a vein over 60 minutes at 150 mL/hr every 24 hours for 19 days  Patient not taking: Reported on 7/17/2024 7/3/24 7/22/24  Vane Davis MD   Zinc 50 MG TABS Take 1 tablet by mouth in the morning    Historical Provider, MD     Vitals:    07/22/24 0830 07/22/24 0845 07/22/24 0900 07/22/24 0915   BP:       BP Location:       Pulse: 101 95 94 94   Resp: (!) 31 (!) 30 (!) 30 (!) 30   Temp:       TempSrc:       SpO2: 98% 97% 97% 98%   Weight:       Height:         Exam:  Physical Exam  Vitals and nursing note reviewed.   Constitutional:       General: She is not in acute distress.     Appearance: She is ill-appearing.   HENT:      Head: Normocephalic and atraumatic.      Nose: Nose normal.      Mouth/Throat:      Mouth: Mucous membranes are dry.   Eyes:      Conjunctiva/sclera: Conjunctivae normal.   Neck:      Comments: Right neck cvc  Cardiovascular:      Rate and Rhythm: Rhythm irregularly irregular.      Pulses:           Radial pulses are 2+ on the left side.      Heart sounds: S1 normal and S2 normal. No murmur heard.     Comments: Hr approx 99 bpm  Pulmonary:      Effort: Tachypnea present.      Breath sounds: Rales present.      Comments: Not on o2 supplement    Poor inspiratory effort  Musculoskeletal:         General: Swelling present.   Skin:     Comments: Chronic pressure wounds. Warm & dry extremities   Neurological:      Mental Status: She is alert.      Comments: Unable to assess   Psychiatric:      Comments: She is awake. She  follows me with her eyes. Sometimes she shakes her head yes or no.      DATA:      Imaging: CXR yesterday afternoon and evening some pulmonary edema no substantial effusions. Poor inspiratory effort    7/3 CT chest - Mild increased intralobular septal thickening, trace fluid in the left oblique fissure, and mild cardiomegaly with small bilateral pleural effusions consistent with heart failure exacerbation/volume overload.     7/21 CT chest - Stable appearance of the chest with mild fluid overload and nonspecific right upper lobe groundglass opacities.     ECG: Yesterday EKG with Afib RVR  bpm non specific ST changes in the lateral leads    Telemetry:   AFIb with average HR this morning , last night as fast as 150s    Echocardiogram:  June 2024 - LVEF 65%, probable diastolic dysfunction, severe LA dilatation, moderate MAC, trace MR/TR    Weights:    Wt Readings from Last 10 Encounters:   07/22/24 104 kg (229 lb 8 oz)   07/11/24 108 kg (238 lb)   07/05/24 108 kg (238 lb 1.6 oz)   06/18/24 108 kg (238 lb 8.6 oz)   06/08/24 103 kg (228 lb)   02/19/24 113 kg (249 lb)   02/06/24 113 kg (249 lb 5.4 oz)   10/06/23 117 kg (258 lb)   06/01/23 117 kg (258 lb)   12/16/22 120 kg (264 lb 15.9 oz)         Lab Studies:      Results from last 7 days   Lab Units 07/22/24  0426 07/21/24  1608 07/21/24  0933   WBC Thousand/uL 9.32  --  6.72   HEMOGLOBIN g/dL 8.8*  --  10.2*   HEMATOCRIT % 28.1*  --  33.1*   PLATELETS Thousands/uL 187 156 191   ,   Results from last 7 days   Lab Units 07/22/24  0543 07/22/24  0426 07/21/24  1818 07/21/24  0933   POTASSIUM mmol/L 4.7 5.4* 3.0* 2.7*   CHLORIDE mmol/L 114* 114* 112* 109*   CO2 mmol/L 15* 15* 18* 20*   BUN mg/dL 26* 26* 25 24   CREATININE mg/dL 2.77* 2.77* 2.74* 2.58*   CALCIUM mg/dL 8.4 8.6 8.7 8.7   ALK PHOS U/L  --   --   --  46   ALT U/L  --   --   --  23   AST U/L  --   --   --  27     Trop [id number]    Rosette Pablo PA-C

## 2024-07-22 NOTE — PROGRESS NOTES
[facility]  Progress Note  Name: Bhavna Al I  MRN: [id number]  Unit/Bed#: ICU 02 I Date of Admission: 7/21/2024   Date of Service: 7/22/2024 I Hospital Day: 1    Assessment & Plan   Shock ([facility])  Assessment & Plan  Recent Labs     07/21/24  0933 07/22/24  0426   PROCALCITONI 40.79* 36.36*     Recent Labs     07/21/24  0933   BLOODCX Received in Microbiology Lab. Culture in Progress.  Received in Microbiology Lab. Culture in Progress.     Undifferentiated  Continue cefepime and stress dose steroid.  Vanco level is above goal so she will not need vancomycin at this moment.  Infectious disease on board, appreciate recommendations  Volume resuscitation completed  Follow lactic acid  Follow blood culture results    * Sepsis ([facility])  Assessment & Plan  WBC count normal  Procal elevated but downtrending    Plan:  Continue cefepime  Vanco level above goal  Given another 500 cc bolus to improve blood pressure and started on Bicarb with D5 drip  Monitor white count  Continuous cardiopulmonary monitoring    Hypotension  Assessment & Plan  Possibly secondary to sepsis  Currently on Levophed and vasopressin. Wean off of pressors.  Hold digoxin   Cardiology on board  Maintain mean arterial pressure 65 or greater    SIRS (systemic inflammatory response syndrome) ([facility])  Assessment & Plan  Monitor white count  Monitor I's and O's  Monitor fever curve  Continue cefepime      AMANDA (acute kidney injury) ([facility])  Assessment & Plan  Recent Labs     07/21/24  1818 07/22/24  0426 07/22/24  0543   CREATININE 2.74* 2.77* 2.77*   EGFR 16 16 16     Creatinine baseline is 1-1.2  Monitor kidney indices  Avoid nephrotoxic medications  Currently stable      Elevated troponin  Assessment & Plan  Lab Results   Component Value Date    HSTNI0 1,374 (H) 07/21/2024    HSTNI2 1,239 (H) 07/21/2024    HSTNID2 -135 07/21/2024    HSTNI4 364 (H) 06/29/2024    HSTNID4 84 (H) 06/29/2024     Cardiology consulted  Continue to trend  troponins  Continuous cardiopulmonary monitoring      Chronic pruritic rash in adult  Assessment & Plan  Diagnosis psoriasis proven by biopsy  Continue to use Lac-Hydrin lotion as needed    Persistent atrial fibrillation with RVR ([facility])  Assessment & Plan  Home med is lopressor 25 mg BID, currently on hold  Admit to the intensive care unit  Cardiology consulted  Continuous cardiopulmonary monitor    Deep tissue injury  Assessment & Plan  Float heels  Monitor skin  Continue Lac-Hydrin as needed  Consider wound consult if needed    Ambulatory dysfunction  Assessment & Plan  Consult physical therapy  Consult Occupational Therapy      Morbid obesity (HCC)  Assessment & Plan  Recommend decrease caloric intake and increase activity    Rheumatoid arthritis (HCC)  Assessment & Plan  Continue Tylenol 650 mg p.o. every 6 hours  Continue Plaquenil    Hypokalemia-resolved as of 7/22/2024  Assessment & Plan  Recent Labs     07/21/24  0933 07/21/24  1818 07/22/24  0426 07/22/24  0543   K 2.7* 3.0* 5.4* 4.7   MG 1.1* 1.2* 1.9  --      Replete as needed  Monitor electrolytes         Disposition: Critical care    ICU Core Measures     A: Assess, Prevent, and Manage Pain Has pain been assessed? Yes  Need for changes to pain regimen? No   B: Both SAT/SAT  N/A   C: Choice of Sedation RASS Goal: N/A patient not on sedation  Need for changes to sedation or analgesia regimen? No   D: Delirium CAM-ICU: Negative   E: Early Mobility  Plan for early mobility? Yes   F: Family Engagement Plan for family engagement today? Yes       Antibiotic Review: Awaiting culture results.     Review of Invasive Devices:    Corey Plan: Continue for accurate I/O monitoring for 48 hours  Central access plan: Medications requiring central line  Nusrat Plan: Keep arterial line for hemodynamic monitoring and frequent labs    Prophylaxis:  VTE VTE covered by:  heparin (porcine), Intravenous, 5.1 Units/kg/hr at 07/22/24 0734  heparin (porcine), Intravenous  heparin  (porcine), Intravenous       Stress Ulcer  covered bypantoprazole (PROTONIX) 40 mg tablet [[id number]] (Long-Term Med), pantoprazole (PROTONIX) EC tablet 40 mg [[id number]]         Significant 24hr Events     24hr events: Patient slept through the night. She was lethargic but waking up and. In the morning she is arousable, and did greet me but does not respond to any questions.     Subjective   Review of Systems: Review of Systems   Unable to perform ROS: Mental status change        Objective                            Vitals I/O      Most Recent Min/Max in 24hrs   Temp 98.7 °F (37.1 °C) Temp  Min: 96.7 °F (35.9 °C)  Max: 99.2 °F (37.3 °C)   Pulse 81 Pulse  Min: 77  Max: 141   Resp (!) 29 Resp  Min: 27  Max: 48   BP (!) 89/59 BP  Min: 70/46  Max: 138/53   O2 Sat 99 % SpO2  Min: 88 %  Max: 100 %      Intake/Output Summary (Last 24 hours) at 7/22/2024 1418  Last data filed at 7/22/2024 1347  Gross per 24 hour   Intake 2702.11 ml   Output 770 ml   Net 1932.11 ml       Diet NPO    Invasive Monitoring   Arterial Line  Anita /56  Arterial Line BP  Min: 95/44  Max: 136/60   MAP 82 mmHg  Arterial Line MAP (mmHg)  Min: 61 mmHg  Max: 88 mmHg           Physical Exam   Physical Exam  Vitals and nursing note reviewed.   Eyes:      Extraocular Movements: Extraocular movements intact.      Conjunctiva/sclera: Conjunctivae normal.      Pupils: Pupils are equal, round, and reactive to light.   Skin:     General: Skin is warm, dry and not mottled extremities.      Capillary Refill: Capillary refill takes less than 2 seconds.      Coloration: Skin is not jaundiced or pale.      Findings: Erythema and rash present. No lesion or wound.      Comments: Diffuse erythema -- confirmed psoriasis on biopsy   HENT:      Head: Normocephalic and atraumatic.      Right Ear: Hearing normal.      Left Ear: Hearing normal.      Mouth/Throat:      Mouth: Mucous membranes are moist.   Neck:      Vascular: No JVD.   no midline tenderness  Cardiovascular:      Rate and Rhythm: Tachycardia present. Rhythm irregular.      Pulses: Decreased pulses.      Heart sounds: No murmur heard.     No friction rub. No gallop.   Musculoskeletal:         General: No swelling, tenderness, deformity or signs of injury. Normal range of motion.      Right lower leg: Trace Edema present.      Left lower leg: Trace Edema present.   Abdominal: General: Abdomen is protuberant. Bowel sounds are decreased. There is no distension.      Palpations: Abdomen is soft.      Tenderness: There is no abdominal tenderness.   Constitutional:       General: She is not in acute distress.     Appearance: She is well-developed and well-nourished. She is ill-appearing and toxic-appearing.      Interventions: She is not sedated, not intubated and not restrained.  Pulmonary:      Effort: Tachypnea present. No accessory muscle usage, respiratory distress or accessory muscle usage. She is not intubated.      Breath sounds: No wheezing, rhonchi or rales.   Psychiatric:         Mood and Affect:  mood and affect abnormal.  Neurological:      Mental Status: She is lethargic.      GCS: GCS eye subscore is 3. GCS verbal subscore is 4. GCS motor subscore is 6.      Cranial Nerves: Cranial nerves 2-12 are intact. No facial asymmetry.      Sensory: Sensation is intact.   Genitourinary/Anorectal:  Corey present.          Diagnostic Studies      EKG: NSR  Imaging: CT chest abdomen pelvis wo contrast shows stable appearance of the chest with mild fluid overload and nonspecific right upper lobe groundglass opacities. I have personally reviewed pertinent reports.       Medications:  Scheduled PRN   ammonium lactate, , BID  cefepime, 1,000 mg, Q12H  chlorhexidine, 15 mL, Q12H FAMILIA  hydrocortisone sodium succinate, 50 mg, Q6H FAMILIA  pantoprazole, 40 mg, Q24H FAMILIA      acetaminophen, 650 mg, Q4H PRN  albuterol, 2 puff, Q6H PRN  heparin (porcine), 2,000 Units, Q6H PRN  heparin (porcine), 4,000 Units, Q6H  PRN  levalbuterol, 1.25 mg, Q8H PRN  ondansetron, 4 mg, Q4H PRN  trimethobenzamide, 200 mg, Q6H PRN  [START ON 7/23/2024] vancomycin, 750 mg, Daily PRN       Continuous    heparin (porcine), 3-20 Units/kg/hr (Order-Specific), Last Rate: 5.1 Units/kg/hr (07/22/24 0734)  norepinephrine, 1-30 mcg/min, Last Rate: 14 mcg/min (07/22/24 1356)  sodium bicarbonate 150 mEq in dextrose 5 % 1,000 mL infusion, 100 mL/hr, Last Rate: 100 mL/hr (07/22/24 1238)  vasopressin, 0.04 Units/min, Last Rate: 0.04 Units/min (07/22/24 0817)         Labs:    CBC    Recent Labs     07/21/24  0933 07/21/24  1608 07/22/24  0426   WBC 6.72  --  9.32   HGB 10.2*  --  8.8*   HCT 33.1*  --  28.1*    156 187   BANDSPCT  --   --  12*     BMP    Recent Labs     07/22/24  0426 07/22/24  0543   SODIUM 147 146   K 5.4* 4.7   * 114*   CO2 15* 15*   AGAP 18* 17*   BUN 26* 26*   CREATININE 2.77* 2.77*   CALCIUM 8.6 8.4       Coags    Recent Labs     07/21/24  0933 07/21/24  2151 07/22/24  0543   INR 1.83*  --   --    PTT 37 >210* 117*        Additional Electrolytes  Recent Labs     07/21/24  1818 07/22/24  0426   MG 1.2* 1.9   PHOS 2.9  --           Blood Gas    No recent results  Recent Labs     07/21/24  2356   PHVEN 7.343   [id number] 36.0*   PO2VEN 43.0   ESQ5KUA 19.1*   BEVEN -6.0   [id number] 77.3    LFTs  Recent Labs     07/21/24  0933   ALT 23   AST 27   ALKPHOS 46   ALB 2.5*   TBILI 0.60       Infectious  Recent Labs     07/21/24  0933 07/22/24  0426   PROCALCITONI 40.79* 36.36*     Glucose  Recent Labs     07/21/24  0933 07/21/24  1818 07/22/24  0426 07/22/24  0543   GLUC 67 59* 46* 126               Junaid Mendoza MD

## 2024-07-22 NOTE — ASSESSMENT & PLAN NOTE
Trops trended 1374- 1239  No ischemic changes on EKG last night but Afib RVR rate somewhat masks the ST segment. This morning no ischemic changes on the telemetry.  Possibly troponin elevation is due to shock state, Afib RVR and AMANDA on CKD.  Ordered limited echo to check WMA now that Rate is better controlled  She is on heparin gtt also in part due to CHADSvasc 5 & being unable to take her PO Eliquis, continue the heparin while NPO

## 2024-07-22 NOTE — ASSESSMENT & PLAN NOTE
LVEF 65%, probable diastolic dysfunction, severe LA dilatation, moderate MAC, trace MR/TR, June 2024   Pulmonary edema likely due to Afib RVR  At [place] was Rx Lasix 20 mg QD  Hold on further IV diuresis for now and monitor for renal recovery

## 2024-07-22 NOTE — PROCEDURES
Arterial Line Insertion    Date/Time: 7/21/2024 10:36 PM    Performed by: AZIZA Londono  Authorized by: AZIZA Londono    Patient location:  Bedside and ICU  Consent:     Consent obtained:  Emergent situation    Consent given by:  Patient  [place] protocol:     Radiology Images displayed and confirmed.  If images not available, report reviewed: yes      Required blood products, implants, devices, and special equipment available: yes      Site/side marked: yes      Immediately prior to procedure a time out was called: yes      Patient identity confirmed:  Verbally with patient and hospital-assigned identification number  Indications:     Indications: hemodynamic monitoring and frequent labs / infusion    Pre-procedure details:     Skin preparation:  Chlorhexidine    Preparation: Patient was prepped and draped in sterile fashion    Anesthesia (see MAR for exact dosages):     Anesthesia method:  Local infiltration    Local anesthetic:  Lidocaine 1% w/o epi  Procedure details:     Location / Tip of Catheter:  Radial    Laterality:  Right    Theron's test performed: yes      Theron's test abnormal: no      Needle gauge:  20 G    Placement technique:  Seldinger    Number of attempts:  2    Successful placement: yes      Transducer: waveform confirmed    Post-procedure details:     Post-procedure:  Sterile dressing applied, sutured and wrist guard applied    CMS:  Normal    Patient tolerance of procedure:  Tolerated well, no immediate complications

## 2024-07-22 NOTE — WOUND OSTOMY CARE
Consult Note - Wound   Bhavna P Mikaela 74 y.o. female MRN: [id number]  Unit/Bed#: ICU 02 Encounter: [id number]      History and Present Illness:  74 year old female presented to the hospital with abdominal pain, decreased po intake and hypotension.  Patient's history significant for CHF, atrial fibrillation, HTN, rheumatoid arthritis.    Wound care team consulted for multiple tears sacrum/arms/folds.    Assessment Findings:   Patient assessed along with primary RN.  She is on low air-loss mattress with ATR positioning system in use, dependent for turning/repositioning.  Corey catheter in place, incontinent of loose stool on exam--prince-anal care provided.  Bilateral heels intact with preventative foam dressings in place, elevated off of bed.  Patient with bilateral lower extremity external rotation.  Skin is generally thin, dry, flaky, very fragile.  Currently NPO.  MASD/intertriginous dermatitis to bilateral lateral abdominal folds, left breast fold/chest, and right medial thigh--pink and beefy red, linear open areas along skin folds with scant serosanguinous drainage.  Prince-wound areas fragile, but intact.  No evidence of fungal component at this time.  Right arm/antecubital--skin tear versus moisture damage/intertriginous dermatitis.  Beefy red with scant serosanguinous drainage.  Prince-wound fragile with hyperpigmentation.  Patient moaning with care to this area.  MASD to sacrum--linear, beefy red, partial thickness open area along gluteal cleft with scant serosanguinous drainage.  Prince-wound intact.    Present on admission wound of mixed etiology to left proximal posterior thigh--likely related to pressure (deep tissue pressure injury) and moisture damage with scattered pink, partial thickness open areas and purple, intact, non-blanchable areas.  Prince-wound intact with hypo and hyperpigmented scar tissue.  No drainage or induration.    See flowsheet for wound details.    Wound Care Plan:   1-Apply silicone bordered  foam dressings to bilateral heels for prevention.  Moo with P.  Peel back for skin assessments at least daily and re-apply.  Change dressings every 3 days and as needed.  2-Elevate heels off of bed/chair surface to offload pressure.  3-Offloading air cushion in chair when out of bed, if able.  4-Apply moisturizing skin cream to body twice daily and as needed.  5-Turn/reposition every 2 hours while in bed and weight shift frequently while in chair for pressure re-distribution on skin.   6-Buttock/sacrum/posterior thighs--cleanse with soap and water, pat dry.  Apply Zinc Oxide/Calazime paste three times daily and as needed with incontinence care.  7-Bilateral breast and right medial thigh creases--cleanse with soap and water, pat dry.  Apply Interdry in single layer to skin fold, ensure approximately 2 inches of fabric is visible outside of fold.  Remove Interdry daily for bathing and re-apply.  Change Interdry sheet every other day or if visibly soiled.  DO NOT USE CREAMS OR POWDERS IN AREAS WHERE INTERDRY IS IN USE.  8-Right antecubital wound and lateral abdominal fold wounds--cleanse, pat dry.  Apply non-adherent oil emulsion dressing and silicone bordered foam dressing for treatment.  Change dressings every other day and as needed.    Wound care team to follow.  Plan of care reviewed with primary RN.    Wound 07/21/24 MASD Pelvis Anterior;Left (Active)   Wound Image   07/22/24 1644   Wound Description [place] 07/22/24 1644   Maia-wound Assessment Intact;Fragile 07/22/24 1644   Wound Length (cm) 1 cm 07/22/24 1644   Wound Width (cm) 4.5 cm 07/22/24 1644   Wound Depth (cm) 0.1 cm 07/22/24 1644   Wound Surface Area (cm^2) 4.5 cm^2 07/22/24 1644   Wound Volume (cm^3) 0.45 cm^3 07/22/24 1644   Calculated Wound Volume (cm^3) 0.45 cm^3 07/22/24 1644   Drainage Amount Scant 07/22/24 1644   Drainage Description Serosanguineous 07/22/24 1644   Non-staged Wound Description Partial thickness 07/22/24 1644   Treatments Cleansed  07/22/24 1644   Dressing Open to air 07/22/24 1644       Wound 07/21/24 Skin tear Arm Anterior;Right (Active)   Wound Image   07/22/24 1630   Wound Description Beefy red 07/22/24 1630   Maia-wound Assessment Hyperpigmented;Intact;Fragile 07/22/24 1630   Wound Length (cm) 1.5 cm 07/22/24 1630   Wound Width (cm) 1.7 cm 07/22/24 1630   Wound Depth (cm) 0.1 cm 07/22/24 1630   Wound Surface Area (cm^2) 2.55 cm^2 07/22/24 1630   Wound Volume (cm^3) 0.255 cm^3 07/22/24 1630   Calculated Wound Volume (cm^3) 0.26 cm^3 07/22/24 1630   Drainage Amount Scant 07/22/24 1630   Drainage Description Serosanguineous 07/22/24 1630   Non-staged Wound Description Partial thickness 07/22/24 1630   Treatments Cleansed 07/22/24 1630   Dressing Foam, Silicon (eg. Allevyn, etc);Non adherent 07/22/24 1630   Dressing Changed Changed 07/22/24 1630   Patient Tolerance Tolerated well 07/22/24 1630   Dressing Status Clean;Dry;Intact 07/22/24 1630       Wound 07/21/24 MASD Sacrum Mid (Active)   Wound Image   07/22/24 1646   Wound Description Beefy red 07/22/24 1646   Maia-wound Assessment Intact 07/22/24 1646   Wound Length (cm) 3.5 cm 07/22/24 1646   Wound Width (cm) 0.3 cm 07/22/24 1646   Wound Depth (cm) 0.1 cm 07/22/24 1646   Wound Surface Area (cm^2) 1.05 cm^2 07/22/24 1646   Wound Volume (cm^3) 0.105 cm^3 07/22/24 1646   Calculated Wound Volume (cm^3) 0.11 cm^3 07/22/24 1646   Drainage Amount Scant 07/22/24 1646   Drainage Description Serosanguineous 07/22/24 1646   Non-staged Wound Description Partial thickness 07/22/24 1646   Treatments Cleansed 07/22/24 1646   Dressing Protective barrier 07/22/24 1646   Wound 07/22/24 MASD Thigh Right;Medial;Distal (Active)   Wound Image   07/22/24 1638   Wound Description [place] 07/22/24 1638   Maia-wound Assessment Intact 07/22/24 1638   Wound Length (cm) 0.7 cm 07/22/24 1638   Wound Width (cm) 4 cm 07/22/24 1638   Wound Depth (cm) 0.1 cm 07/22/24 1638   Wound Surface Area (cm^2) 2.8 cm^2 07/22/24 1638    Wound Volume (cm^3) 0.28 cm^3 07/22/24 1638   Calculated Wound Volume (cm^3) 0.28 cm^3 07/22/24 1638   Drainage Amount Scant 07/22/24 1638   Drainage Description Serosanguineous 07/22/24 1638   Non-staged Wound Description Partial thickness 07/22/24 1638   Treatments Cleansed 07/22/24 1638   Dressing Other (Comment) 07/22/24 1638   Dressing Changed New 07/22/24 1638   Patient Tolerance Tolerated well 07/22/24 1638   Dressing Status Clean;Dry;Intact 07/22/24 1638       Wound 07/22/24 MASD Thigh Proximal;Right;Medial (Active)   Wound Image   07/22/24 1639   Wound Description [place] 07/22/24 1639   Maia-wound Assessment Intact 07/22/24 1639   Wound Length (cm) 0.5 cm 07/22/24 1639   Wound Width (cm) 6 cm 07/22/24 1639   Wound Depth (cm) 0.1 cm 07/22/24 1639   Wound Surface Area (cm^2) 3 cm^2 07/22/24 1639   Wound Volume (cm^3) 0.3 cm^3 07/22/24 1639   Calculated Wound Volume (cm^3) 0.3 cm^3 07/22/24 1639   Drainage Amount Scant 07/22/24 1639   Drainage Description Serosanguineous 07/22/24 1639   Non-staged Wound Description Partial thickness 07/22/24 1639   Treatments Cleansed 07/22/24 1639   Dressing Other (Comment) 07/22/24 1639   Dressing Changed New 07/22/24 1639   Patient Tolerance Tolerated well 07/22/24 1639   Dressing Status Clean;Dry;Intact 07/22/24 1639       Wound 07/22/24 MASD Chest Left (Active)   Wound Image   07/22/24 1641   Wound Description Beefy red;Pink 07/22/24 1641   Maia-wound Assessment Maceration 07/22/24 1641   Wound Length (cm) 8.5 cm 07/22/24 1641   Wound Width (cm) 14 cm 07/22/24 1641   Wound Depth (cm) 0.1 cm 07/22/24 1641   Wound Surface Area (cm^2) 119 cm^2 07/22/24 1641   Wound Volume (cm^3) 11.9 cm^3 07/22/24 1641   Calculated Wound Volume (cm^3) 11.9 cm^3 07/22/24 1641   Drainage Amount Scant 07/22/24 1641   Drainage Description Serosanguineous 07/22/24 1641   Non-staged Wound Description Partial thickness 07/22/24 1641   Treatments Cleansed 07/22/24 1641   Dressing Other (Comment)  07/22/24 1641   Dressing Changed New 07/22/24 1641   Patient Tolerance Tolerated well 07/22/24 1641   Dressing Status Clean;Dry;Intact 07/22/24 1641       Wound 07/22/24 MASD Thigh Left;Posterior (Active)   Wound Image   07/22/24 1647   Wound Description Light purple;Pink 07/22/24 1647   Maia-wound Assessment Intact;Scar Tissue 07/22/24 1647   Wound Length (cm) 5 cm 07/22/24 1647   Wound Width (cm) 6.5 cm 07/22/24 1647   Wound Depth (cm) 0.1 cm 07/22/24 1647   Wound Surface Area (cm^2) 32.5 cm^2 07/22/24 1647   Wound Volume (cm^3) 3.25 cm^3 07/22/24 1647   Calculated Wound Volume (cm^3) 3.25 cm^3 07/22/24 1647   Drainage Amount None 07/22/24 1647   Non-staged Wound Description Partial thickness 07/22/24 1647   Treatments Cleansed 07/22/24 1647   Dressing Protective barrier 07/22/24 1647   Patient Tolerance Tolerated well 07/22/24 1647       Naima Ribera RN, BSN, CWON

## 2024-07-22 NOTE — PROCEDURES
POC Cardiac US    Date/Time: 7/22/2024 12:28 AM    Performed by: AZIZA Londono  Authorized by: AZIZA Londono    Patient location:  ICU  Procedure details:     Exam Type:  Diagnostic    Indications: hypotension and suspected volume depletion      Assessment / Evaluation for: intravascular volume status      Image quality: limited diagnostic      Image quality comment:  Technically difficult windows  Patient Details:     Cardiac Rhythm:  Irregular    Medications: norepinephrine infusion and vasopressin      Mechanical ventilation: No    Cardiac findings:     Echo technique: limited 2D and M-mode      Views obtained: subcostal    Pulmonary findings:     B-lines: no B-lines present    IVC findings:     IVC Size: indeterminate      IVC Inspiratory Collapse: partial      Maximum IVC Diameter (cm):  1.97    Minimum IVC Diameter (cm):  1.53    IVC Variability (%):  22  Interpretation:     Fluid Status:  Euvolemic

## 2024-07-22 NOTE — ASSESSMENT & PLAN NOTE
WBC count normal  Procal elevated but downtrending    Plan:  Continue cefepime  Vanco level above goal  Given another 500 cc bolus to improve blood pressure and started on Bicarb with D5 drip  Monitor white count  Continuous cardiopulmonary monitoring

## 2024-07-22 NOTE — ASSESSMENT & PLAN NOTE
Recent Labs     07/21/24  0933 07/21/24  1818 07/22/24  0426 07/22/24  0543   K 2.7* 3.0* 5.4* 4.7   MG 1.1* 1.2* 1.9  --      Replete as needed  Monitor electrolytes

## 2024-07-22 NOTE — PROGRESS NOTES
Bhavna Al is a 74 y.o. female who is currently ordered Vancomycin IV with management by the Pharmacy Consult Service.  Relevant clinical data and objective / subjective history reviewed.  Vancomycin Assessment:  Indication and Goal AUC/Trough:  Bone/joint infection (goal -600, trough >10)  Clinical Status: worsening  Micro:      Renal Function:  SCr: 2.77 mg/dL from 2.58 mg/dL on admission; baseline 1.1 to 1.3 mg/dL  Renal replacement: Not on dialysis  Days of Therapy: Day 2 of therapy for the admission, but has been on IV vancomycin as an outpatient since 7/1  Current Dose:  Received 1,750 mg on 7/21 @ 1110 followed by 1,000 mg daily PRN for vancomycin level </= 15 mcg/dL. Had been receiving 750 mg q24h as an outpatient  Last Level: Random level drawn today @  0426 is 35.3 mcg/dL  Vancomycin Plan:  New Dosing:  Continue pulse dosing but will decrease PRN dose to 750 mg daily PRN vancomycin level </= 15 mcg/dL. No dose will be given today  Next Level: Random level with AM labs on 7/23  Renal Function Monitoring: Daily BMP and UOP assessment  Pharmacy will continue to follow closely for s/sx of nephrotoxicity, infusion reactions and appropriateness of therapy.  BMP and CBC will be ordered per protocol. We will continue to follow the patient’s culture results and clinical progress daily.    Yesi Verdugo, PharmD, [facility]  Critical Care and Internal Medicine Clinical Pharmacist  [phone] or via Secure Chat

## 2024-07-22 NOTE — PLAN OF CARE
Problem: Prexisting or High Potential for Compromised Skin Integrity  Goal: Skin integrity is maintained or improved  Description: INTERVENTIONS:  - Identify patients at risk for skin breakdown  - Assess and monitor skin integrity  - Assess and monitor nutrition and hydration status  - Monitor labs   - Assess for incontinence   - Turn and reposition patient  - Assist with mobility/ambulation  - Relieve pressure over bony prominences  - Avoid friction and shearing  - Provide appropriate hygiene as needed including keeping skin clean and dry  - Evaluate need for skin moisturizer/barrier cream  - Collaborate with interdisciplinary team   - Patient/family teaching  - Consider wound care consult   Outcome: Progressing     Problem: PAIN - ADULT  Goal: Verbalizes/displays adequate comfort level or baseline comfort level  Description: Interventions:  - Encourage patient to monitor pain and request assistance  - Assess pain using appropriate pain scale  - Administer analgesics based on type and severity of pain and evaluate response  - Implement non-pharmacological measures as appropriate and evaluate response  - Consider cultural and social influences on pain and pain management  - Notify physician/advanced practitioner if interventions unsuccessful or patient reports new pain  Outcome: Progressing     Problem: INFECTION - ADULT  Goal: Absence or prevention of progression during hospitalization  Description: INTERVENTIONS:  - Assess and monitor for signs and symptoms of infection  - Monitor lab/diagnostic results  - Monitor all insertion sites, i.e. indwelling lines, tubes, and drains  - Monitor endotracheal if appropriate and nasal secretions for changes in amount and color  - [place] appropriate cooling/warming therapies per order  - Administer medications as ordered  - Instruct and encourage patient and family to use good hand hygiene technique  - Identify and instruct in appropriate isolation precautions for  identified infection/condition  Outcome: Progressing     Problem: SAFETY ADULT  Goal: Patient will remain free of falls  Description: INTERVENTIONS:  - Educate patient/family on patient safety including physical limitations  - Instruct patient to call for assistance with activity   - Consult OT/PT to assist with strengthening/mobility   - Keep Call bell within reach  - Keep bed low and locked with side rails adjusted as appropriate  - Keep care items and personal belongings within reach  - Initiate and maintain comfort rounds  - Make Fall Risk Sign visible to staff  - Offer Toileting every  Hours, in advance of need  - Initiate/Maintain alarm  - Obtain necessary fall risk management equipment:   - Apply yellow socks and bracelet for high fall risk patients  - Consider moving patient to room near nurses station  Outcome: Progressing  Goal: Maintain or return to baseline ADL function  Description: INTERVENTIONS:  -  Assess patient's ability to carry out ADLs; assess patient's baseline for ADL function and identify physical deficits which impact ability to perform ADLs (bathing, care of mouth/teeth, toileting, grooming, dressing, etc.)  - Assess/evaluate cause of self-care deficits   - Assess range of motion  - Assess patient's mobility; develop plan if impaired  - Assess patient's need for assistive devices and provide as appropriate  - Encourage maximum independence but intervene and supervise when necessary  - Involve family in performance of ADLs  - Assess for home care needs following discharge   - Consider OT consult to assist with ADL evaluation and planning for discharge  - Provide patient education as appropriate  Outcome: Progressing  Goal: Maintains/Returns to pre admission functional level  Description: INTERVENTIONS:  - Perform AM-PAC 6 Click Basic Mobility/ Daily Activity assessment daily.  - Set and communicate daily mobility goal to care team and patient/family/caregiver.   - Collaborate with  rehabilitation services on mobility goals if consulted  - Perform Range of Motion  times a day.  - Reposition patient every  hours.  - Dangle patient  times a day  - Stand patient  times a day  - Ambulate patient  times a day  - Out of bed to chair  times a day   - Out of bed for meals  times a day  - Out of bed for toileting  - Record patient progress and toleration of activity level   Outcome: Progressing     Problem: DISCHARGE PLANNING  Goal: Discharge to home or other facility with appropriate resources  Description: INTERVENTIONS:  - Identify barriers to discharge w/patient and caregiver  - Arrange for needed discharge resources and transportation as appropriate  - Identify discharge learning needs (meds, wound care, etc.)  - Arrange for interpretive services to assist at discharge as needed  - Refer to Case Management Department for coordinating discharge planning if the patient needs post-hospital services based on physician/advanced practitioner order or complex needs related to functional status, cognitive ability, or social support system  Outcome: Progressing     Problem: Knowledge Deficit  Goal: Patient/family/caregiver demonstrates understanding of disease process, treatment plan, medications, and discharge instructions  Description: Complete learning assessment and assess knowledge base.  Interventions:  - Provide teaching at level of understanding  - Provide teaching via preferred learning methods  Outcome: Progressing     Problem: CARDIOVASCULAR - ADULT  Goal: Maintains optimal cardiac output and hemodynamic stability  Description: INTERVENTIONS:  - Monitor I/O, vital signs and rhythm  - Monitor for S/S and trends of decreased cardiac output  - Administer and titrate ordered vasoactive medications to optimize hemodynamic stability  - Assess quality of pulses, skin color and temperature  - Assess for signs of decreased coronary artery perfusion  - Instruct patient to report change in severity of  symptoms  Outcome: Progressing  Goal: Absence of cardiac dysrhythmias or at baseline rhythm  Description: INTERVENTIONS:  - Continuous cardiac monitoring, vital signs, obtain 12 lead EKG if ordered  - Administer antiarrhythmic and heart rate control medications as ordered  - Monitor electrolytes and administer replacement therapy as ordered  Outcome: Progressing     Problem: RESPIRATORY - ADULT  Goal: Achieves optimal ventilation and oxygenation  Description: INTERVENTIONS:  - Assess for changes in respiratory status  - Assess for changes in mentation and behavior  - Position to facilitate oxygenation and minimize respiratory effort  - Oxygen administered by appropriate delivery if ordered  - Initiate smoking cessation education as indicated  - Encourage broncho-pulmonary hygiene including cough, deep breathe, Incentive Spirometry  - Assess the need for suctioning and aspirate as needed  - Assess and instruct to report SOB or any respiratory difficulty  - Respiratory Therapy support as indicated  Outcome: Progressing     Problem: METABOLIC, FLUID AND ELECTROLYTES - ADULT  Goal: Electrolytes maintained within normal limits  Description: INTERVENTIONS:  - Monitor labs and assess patient for signs and symptoms of electrolyte imbalances  - Administer electrolyte replacement as ordered  - Monitor response to electrolyte replacements, including repeat lab results as appropriate  - Instruct patient on fluid and nutrition as appropriate  Outcome: Progressing  Goal: Fluid balance maintained  Description: INTERVENTIONS:  - Monitor labs   - Monitor I/O and WT  - Instruct patient on fluid and nutrition as appropriate  - Assess for signs & symptoms of volume excess or deficit  Outcome: Progressing  Goal: Glucose maintained within target range  Description: INTERVENTIONS:  - Monitor Blood Glucose as ordered  - Assess for signs and symptoms of hyperglycemia and hypoglycemia  - Administer ordered medications to maintain glucose  within target range  - Assess nutritional intake and initiate nutrition service referral as needed  Outcome: Progressing     Problem: HEMATOLOGIC - ADULT  Goal: Maintains hematologic stability  Description: INTERVENTIONS  - Assess for signs and symptoms of bleeding or hemorrhage  - Monitor labs  - Administer supportive blood products/factors as ordered and appropriate  Outcome: Progressing

## 2024-07-22 NOTE — ASSESSMENT & PLAN NOTE
Possibly secondary to sepsis  Currently on Levophed and vasopressin. Wean off of pressors.  Hold digoxin   Cardiology on board  Maintain mean arterial pressure 65 or greater

## 2024-07-23 ENCOUNTER — APPOINTMENT (INPATIENT)
Dept: RADIOLOGY | Facility: HOSPITAL | Age: 74
DRG: 871 | End: 2024-07-23
Payer: MEDICARE

## 2024-07-23 PROBLEM — R09.02 HYPOXIA: Status: ACTIVE | Noted: 2024-07-23

## 2024-07-23 LAB
ANION GAP SERPL CALCULATED.3IONS-SCNC: 13 MMOL/L (ref 4–13)
APTT PPP: 51 SECONDS (ref 23–37)
APTT PPP: 57 SECONDS (ref 23–37)
APTT PPP: 68 SECONDS (ref 23–37)
APTT PPP: 92 SECONDS (ref 23–37)
BACTERIA UR CULT: NORMAL
BASOPHILS # BLD AUTO: 0.01 THOUSANDS/ÂΜL (ref 0–0.1)
BASOPHILS NFR BLD AUTO: 0 % (ref 0–1)
BUN SERPL-MCNC: 31 MG/DL (ref 5–25)
CALCIUM SERPL-MCNC: 8 MG/DL (ref 8.4–10.2)
CHLORIDE SERPL-SCNC: 113 MMOL/L (ref 96–108)
CO2 SERPL-SCNC: 21 MMOL/L (ref 21–32)
CREAT SERPL-MCNC: 2.71 MG/DL (ref 0.6–1.3)
DIGOXIN SERPL-MCNC: 0.7 NG/ML (ref 0.8–2)
EOSINOPHIL # BLD AUTO: 0 THOUSAND/ÂΜL (ref 0–0.61)
EOSINOPHIL NFR BLD AUTO: 0 % (ref 0–6)
ERYTHROCYTE [DISTWIDTH] IN BLOOD BY AUTOMATED COUNT: 17.2 % (ref 11.6–15.1)
GFR SERPL CREATININE-BSD FRML MDRD: 16 ML/MIN/1.73SQ M
GLUCOSE SERPL-MCNC: 104 MG/DL (ref 65–140)
GLUCOSE SERPL-MCNC: 111 MG/DL (ref 65–140)
GLUCOSE SERPL-MCNC: 134 MG/DL (ref 65–140)
GLUCOSE SERPL-MCNC: 135 MG/DL (ref 65–140)
GLUCOSE SERPL-MCNC: 89 MG/DL (ref 65–140)
GLUCOSE SERPL-MCNC: 93 MG/DL (ref 65–140)
HCT VFR BLD AUTO: 24.2 % (ref 34.8–46.1)
HGB BLD-MCNC: 7.6 G/DL (ref 11.5–15.4)
IMM GRANULOCYTES # BLD AUTO: 0.03 THOUSAND/UL (ref 0–0.2)
IMM GRANULOCYTES NFR BLD AUTO: 1 % (ref 0–2)
LACTATE SERPL-SCNC: 1.3 MMOL/L (ref 0.5–2)
LYMPHOCYTES # BLD AUTO: 0.42 THOUSANDS/ÂΜL (ref 0.6–4.47)
LYMPHOCYTES NFR BLD AUTO: 7 % (ref 14–44)
MAGNESIUM SERPL-MCNC: 2.2 MG/DL (ref 1.9–2.7)
MCH RBC QN AUTO: 27.8 PG (ref 26.8–34.3)
MCHC RBC AUTO-ENTMCNC: 31.4 G/DL (ref 31.4–37.4)
MCV RBC AUTO: 89 FL (ref 82–98)
MONOCYTES # BLD AUTO: 0.28 THOUSAND/ÂΜL (ref 0.17–1.22)
MONOCYTES NFR BLD AUTO: 5 % (ref 4–12)
NEUTROPHILS # BLD AUTO: 5.31 THOUSANDS/ÂΜL (ref 1.85–7.62)
NEUTS SEG NFR BLD AUTO: 87 % (ref 43–75)
NRBC BLD AUTO-RTO: 0 /100 WBCS
PHOSPHATE SERPL-MCNC: 3.4 MG/DL (ref 2.3–4.1)
PLATELET # BLD AUTO: 105 THOUSANDS/UL (ref 149–390)
PMV BLD AUTO: 13.6 FL (ref 8.9–12.7)
POTASSIUM SERPL-SCNC: 3.6 MMOL/L (ref 3.5–5.3)
RBC # BLD AUTO: 2.73 MILLION/UL (ref 3.81–5.12)
SODIUM SERPL-SCNC: 147 MMOL/L (ref 135–147)
VANCOMYCIN SERPL-MCNC: 26.6 UG/ML (ref 10–20)
WBC # BLD AUTO: 6.05 THOUSAND/UL (ref 4.31–10.16)

## 2024-07-23 PROCEDURE — 99291 CRITICAL CARE FIRST HOUR: CPT | Performed by: INTERNAL MEDICINE

## 2024-07-23 PROCEDURE — 83735 ASSAY OF MAGNESIUM: CPT | Performed by: INTERNAL MEDICINE

## 2024-07-23 PROCEDURE — 82948 REAGENT STRIP/BLOOD GLUCOSE: CPT

## 2024-07-23 PROCEDURE — 85730 THROMBOPLASTIN TIME PARTIAL: CPT | Performed by: NURSE PRACTITIONER

## 2024-07-23 PROCEDURE — 83010 ASSAY OF HAPTOGLOBIN QUANT: CPT | Performed by: STUDENT IN AN ORGANIZED HEALTH CARE EDUCATION/TRAINING PROGRAM

## 2024-07-23 PROCEDURE — 80202 ASSAY OF VANCOMYCIN: CPT | Performed by: INTERNAL MEDICINE

## 2024-07-23 PROCEDURE — 80162 ASSAY OF DIGOXIN TOTAL: CPT

## 2024-07-23 PROCEDURE — 80048 BASIC METABOLIC PNL TOTAL CA: CPT

## 2024-07-23 PROCEDURE — 85025 COMPLETE CBC W/AUTO DIFF WBC: CPT

## 2024-07-23 PROCEDURE — 85730 THROMBOPLASTIN TIME PARTIAL: CPT | Performed by: INTERNAL MEDICINE

## 2024-07-23 PROCEDURE — 84100 ASSAY OF PHOSPHORUS: CPT | Performed by: STUDENT IN AN ORGANIZED HEALTH CARE EDUCATION/TRAINING PROGRAM

## 2024-07-23 PROCEDURE — 83605 ASSAY OF LACTIC ACID: CPT

## 2024-07-23 PROCEDURE — 71045 X-RAY EXAM CHEST 1 VIEW: CPT

## 2024-07-23 PROCEDURE — 99232 SBSQ HOSP IP/OBS MODERATE 35: CPT | Performed by: INTERNAL MEDICINE

## 2024-07-23 PROCEDURE — 99233 SBSQ HOSP IP/OBS HIGH 50: CPT | Performed by: INTERNAL MEDICINE

## 2024-07-23 RX ORDER — LEVALBUTEROL INHALATION SOLUTION 1.25 MG/3ML
1.25 SOLUTION RESPIRATORY (INHALATION) EVERY 8 HOURS PRN
Status: DISCONTINUED | OUTPATIENT
Start: 2024-07-24 | End: 2024-07-23

## 2024-07-23 RX ORDER — DEXTROSE MONOHYDRATE 50 MG/ML
75 INJECTION, SOLUTION INTRAVENOUS CONTINUOUS
Status: DISCONTINUED | OUTPATIENT
Start: 2024-07-23 | End: 2024-07-24

## 2024-07-23 RX ORDER — POTASSIUM CHLORIDE 14.9 MG/ML
20 INJECTION INTRAVENOUS ONCE
Status: COMPLETED | OUTPATIENT
Start: 2024-07-23 | End: 2024-07-24

## 2024-07-23 RX ORDER — LEVALBUTEROL INHALATION SOLUTION 1.25 MG/3ML
1.25 SOLUTION RESPIRATORY (INHALATION) EVERY 8 HOURS PRN
Status: DISCONTINUED | OUTPATIENT
Start: 2024-07-23 | End: 2024-08-02 | Stop reason: HOSPADM

## 2024-07-23 RX ORDER — LEVALBUTEROL INHALATION SOLUTION 1.25 MG/3ML
1.25 SOLUTION RESPIRATORY (INHALATION)
Status: DISCONTINUED | OUTPATIENT
Start: 2024-07-23 | End: 2024-07-23

## 2024-07-23 RX ORDER — DIGOXIN 0.25 MG/ML
62.5 INJECTION INTRAMUSCULAR; INTRAVENOUS DAILY
Status: CANCELLED | OUTPATIENT
Start: 2024-07-23

## 2024-07-23 RX ORDER — MAGNESIUM SULFATE HEPTAHYDRATE 40 MG/ML
2 INJECTION, SOLUTION INTRAVENOUS ONCE
Status: COMPLETED | OUTPATIENT
Start: 2024-07-23 | End: 2024-07-24

## 2024-07-23 RX ORDER — FUROSEMIDE 10 MG/ML
80 INJECTION INTRAMUSCULAR; INTRAVENOUS ONCE
Status: COMPLETED | OUTPATIENT
Start: 2024-07-23 | End: 2024-07-23

## 2024-07-23 RX ORDER — SODIUM CHLORIDE FOR INHALATION 0.9 %
3 VIAL, NEBULIZER (ML) INHALATION
Status: DISCONTINUED | OUTPATIENT
Start: 2024-07-23 | End: 2024-07-23

## 2024-07-23 RX ORDER — SODIUM CHLORIDE FOR INHALATION 3 %
4 VIAL, NEBULIZER (ML) INHALATION
Status: DISCONTINUED | OUTPATIENT
Start: 2024-07-23 | End: 2024-07-23

## 2024-07-23 RX ADMIN — Medication: at 08:00

## 2024-07-23 RX ADMIN — POTASSIUM CHLORIDE 20 MEQ: 14.9 INJECTION, SOLUTION INTRAVENOUS at 10:27

## 2024-07-23 RX ADMIN — CHLORHEXIDINE GLUCONATE 15 ML: 1.2 RINSE ORAL at 08:00

## 2024-07-23 RX ADMIN — HEPARIN SODIUM 9.1 UNITS/KG/HR: 10000 INJECTION, SOLUTION INTRAVENOUS at 17:25

## 2024-07-23 RX ADMIN — DEXTROSE 75 ML/HR: 5 SOLUTION INTRAVENOUS at 10:27

## 2024-07-23 RX ADMIN — CEFEPIME 1000 MG: 1 INJECTION, POWDER, FOR SOLUTION INTRAMUSCULAR; INTRAVENOUS at 10:31

## 2024-07-23 RX ADMIN — VASOPRESSIN 0.04 UNITS/MIN: 20 INJECTION INTRAVENOUS at 02:09

## 2024-07-23 RX ADMIN — DEXTROSE 75 ML/HR: 5 SOLUTION INTRAVENOUS at 22:45

## 2024-07-23 RX ADMIN — HYDROCORTISONE SODIUM SUCCINATE 50 MG: 100 INJECTION, POWDER, FOR SOLUTION INTRAMUSCULAR; INTRAVENOUS at 20:27

## 2024-07-23 RX ADMIN — CEFEPIME 1000 MG: 1 INJECTION, POWDER, FOR SOLUTION INTRAMUSCULAR; INTRAVENOUS at 22:43

## 2024-07-23 RX ADMIN — CHLORHEXIDINE GLUCONATE 15 ML: 1.2 RINSE ORAL at 20:27

## 2024-07-23 RX ADMIN — HYDROCORTISONE SODIUM SUCCINATE 50 MG: 100 INJECTION, POWDER, FOR SOLUTION INTRAMUSCULAR; INTRAVENOUS at 05:21

## 2024-07-23 RX ADMIN — PANTOPRAZOLE SODIUM 40 MG: 40 INJECTION, POWDER, FOR SOLUTION INTRAVENOUS at 08:00

## 2024-07-23 RX ADMIN — FUROSEMIDE 80 MG: 10 INJECTION, SOLUTION INTRAMUSCULAR; INTRAVENOUS at 11:15

## 2024-07-23 RX ADMIN — MAGNESIUM SULFATE HEPTAHYDRATE 2 G: 40 INJECTION, SOLUTION INTRAVENOUS at 20:27

## 2024-07-23 RX ADMIN — HEPARIN SODIUM 2000 UNITS: 1000 INJECTION INTRAVENOUS; SUBCUTANEOUS at 05:21

## 2024-07-23 RX ADMIN — HEPARIN SODIUM 2000 UNITS: 1000 INJECTION INTRAVENOUS; SUBCUTANEOUS at 17:25

## 2024-07-23 RX ADMIN — HYDROCORTISONE SODIUM SUCCINATE 50 MG: 100 INJECTION, POWDER, FOR SOLUTION INTRAMUSCULAR; INTRAVENOUS at 13:30

## 2024-07-23 RX ADMIN — Medication: at 17:21

## 2024-07-23 NOTE — ASSESSMENT & PLAN NOTE
WBC count normal  Procal elevated but downtrending  Bicarb uptrended to normal after bicarb with D5  drip    Plan:  Continue cefepime   Vanco level above goal  Monitor white count  Continuous cardiopulmonary monitoring

## 2024-07-23 NOTE — CASE MANAGEMENT
Case Management Discharge Planning Note    Patient name Bhavna Al  Location ICU 02/ICU 02 MRN [id number]  : 1950 Date 2024       Current Admission Date: 2024  Current Admission Diagnosis:Sepsis ([facility])   Patient Active Problem List    Diagnosis Date Noted Date Diagnosed    Hypoxia 2024     Sepsis ([facility]) 2024     AMANDA (acute kidney injury) ([facility]) 2024     Chronic pruritic rash in adult 2024     Secondary adrenal insufficiency ([facility]) 2024     Pressure ulcer of left buttock, stage 3 ([facility]) 2024     Acute osteomyelitis of thoracic spine ([facility]) 2024     MRSA bacteremia 2024     Hypotension 2024     Dysphagia 2024     Deep tissue injury 2024     Hypernatremia 2024     Localized swelling on left hand 2023     Stage 3 chronic kidney disease ([facility]) 2023     Febrile illness 2023     Dermatitis associated with incontinence 2023     Right shoulder pain 12/15/2022     Abnormal urinalysis 2022     Ambulatory dysfunction 2022     Hyperuricemia 2022     Acute metabolic encephalopathy 2022     Acute on chronic diastolic heart failure, LVEF 65%, LVIDd 3.3 cm, AHA Stage C 2022     Acute bronchitis 2022     Assistance needed with transportation 09/15/2022     Abnormal CT of the chest 2022     Gram-positive cocci bacteremia 2022     Psoriasis 2022     Elevated troponin 2022     COVID-19 2022     Shock ([facility]) 01/10/2022     Persistent atrial fibrillation 01/10/2022     Hyperparathyroidism ([facility]) 2021     Murmur, cardiac 2021     BPPV (benign paroxysmal positional vertigo) 2018     Seasonal allergic rhinitis due to pollen 2018     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     Osteoporosis 2016     SIRS (systemic inflammatory response syndrome) ([facility]) 2016     Leukopenia 2016     Hypoalbuminemia 2016     Rheumatoid  arthritis ([facility]) 08/23/2016     GERD (gastroesophageal reflux disease) 08/23/2016     Sarcoid 08/23/2016     Morbid obesity ([facility]) 07/12/2016     Vitamin D deficiency 07/16/2015     Essential hypertension 12/04/2014     Lumbar radiculopathy 12/04/2014       LOS (days): 2  Geometric Mean LOS (GMLOS) (days): 5.1  Days to GMLOS:3     OBJECTIVE:  Risk of Unplanned Readmission Score: 39.54       Current admission status: Inpatient   Preferred Pharmacy:   [id number] - [place], [place] - [address]  [address]  [place] [address]  Phone: [phone] Fax: [phone]    [facility] - [address] [address]  Phone: [phone] Fax: [phone]    Primary Care Provider: Nya Taveras,     Primary Insurance: MEDICARE  Secondary Insurance: [facility]    DISCHARGE DETAILS:     Did patient/caregiver verbalize understanding of patient care needs?: N/A- going to facility  Were patient/caregiver advised of the risks associated with not following Treatment Team discharge recommendations?: Yes    Contacts  Patient Contacts: Rhoda Al (Child)  [phone]  Relationship to Patient:: Family  Phone Number: [phone]  Reason/Outcome: Discharge Planning    Treatment Team Recommendation: SNF     Additional Comments: Patient to be discharged back to [facility] once stable for discharge. Referral placed and the facility reserved. CM will provide daily updates to the facility until discharge and will continue following patient for disposition.

## 2024-07-23 NOTE — ASSESSMENT & PLAN NOTE
Has been on Lopressor for many years. Most recent dose was 25 mg q12h. This is on hold given hypotension.  RVR resolved with treatment for shock & IV digoxin. Dig level this am 0.7.  Rate is controlled, holding digoxin given AMANDA on CKD.  Telemetry monitoring.  Ensure K > 4 & Mag > 2  CHADSvasc 5. Subbed home Eliquis 5 mg BID for heparin gtt for stroke prevention.

## 2024-07-23 NOTE — PROGRESS NOTES
Former[facility]  Progress Note  Name: Bhavna Al I  MRN: [id number]  Unit/Bed#: ICU 02 I Date of Admission: 7/21/2024   Date of Service: 7/23/2024 I Hospital Day: 2    Assessment & Plan   Persistent atrial fibrillation  Has been on Lopressor for many years. Most recent dose was 25 mg q12h. This is on hold given hypotension.  RVR resolved with treatment for shock & IV digoxin. Dig level this AM was 0.7.  Rate is controlled, holding digoxin given AMANDA on CKD. Continue Telemetry monitoring.  Can re-dose with digoxin if needed for RVR or once she stabilizes, resume home BB.  Ensure K > 4 & Mag > 2  CHADSvasc 5. Subbed home Eliquis 5 mg BID for heparin gtt for stroke prevention.    Acute on chronic diastolic heart failure, LVEF 65%, LVIDd 3.3 cm, AHA Stage C  Pulmonary edema on imaging on admission was likely due to Afib RVR which is now resolved  At [place] was Rx Lasix 20 mg QD  ICU intermittently dosing IV Lasix.     Elevated troponin  Non-MI troponin elevation secondary to sepsis, acute kidney injury superimposed on chronic kidney disease and tachycardia   Limited echo 7/22 with no wall motion abnormalities    Subjective:  Patient does not answer my questions with verbal response. When I ask her if she is tired she shakes her head yes. When I ask her if she has chest pain she shakes her head no. When I ask her if she has dyspnea she shakes her head no.  `  Pt shakes her head so-so when I ask how she is feeling today compared to yesterday.    Vitals:  Vitals:    07/22/24 1522 07/23/24 0539   Weight: 104 kg (229 lb) 95.7 kg (210 lb 15.7 oz)   ,  Vitals:    07/23/24 0602 07/23/24 0700 07/23/24 0800 07/23/24 0900   BP:       BP Location:       Pulse: 80 84 97 84   Resp:  21 21 (!) 35   Temp:   98.4 °F (36.9 °C)    TempSrc:   Axillary    SpO2: 100% 100% 100% 100%   Weight:       Height:         Exam:  Physical Exam  Vitals and nursing note reviewed.   Constitutional:       General: She is not in  acute distress.     Comments: Awake, nad   HENT:      Head: Normocephalic and atraumatic.      Nose: Nose normal.      Mouth/Throat:      Mouth: Mucous membranes are moist.   Eyes:      Conjunctiva/sclera: Conjunctivae normal.   Neck:      Comments: Right cvc  Cardiovascular:      Rate and Rhythm: Rhythm irregularly irregular.      Heart sounds: S1 normal and S2 normal. No murmur heard.     Comments: Hr approx 70 bpm  Pulmonary:      Breath sounds: No wheezing, rhonchi or rales.      Comments: Wet cough observed she recovers quickly. No supplemental o2 need    Work of breathing at rest is improved compared to yesterday  Abdominal:      General: There is no distension.      Palpations: Abdomen is soft.      Tenderness: There is no abdominal tenderness.   Genitourinary:     Comments: Very small amt few cc of light yellow colored urine in peralta catheter tubing  Musculoskeletal:      Comments: 1 + Dependent edema in the bl le to the thighs   Skin:     Comments: Pressure wounds   Neurological:      Cranial Nerves: No facial asymmetry.      Motor: No tremor.   Psychiatric:      Comments: Unable to assess she is awake and tracking me with her eyes. Answers my questions via shaking her head, yes no or so-so     Telemetry:       AF with rates ranging from  bpm. Averaging 82 BPM.    Medications:    Current Facility-Administered Medications:     acetaminophen (TYLENOL) tablet 650 mg, 650 mg, Oral, Q4H PRN, AZIZA Batista    albuterol (PROVENTIL HFA,VENTOLIN HFA) inhaler 2 puff, 2 puff, Inhalation, Q6H PRN, AZIZA Batista    ammonium lactate (LAC-HYDRIN) 12 % cream, , Topical, BID, AZIZA Batista, Given at 07/23/24 0800    cefepime (MAXIPIME) 1,000 mg in dextrose 5 % 50 mL IVPB, 1,000 mg, Intravenous, Q12H, AZIZA Batista, Last Rate: 100 mL/hr at 07/22/24 2144, 1,000 mg at 07/22/24 2144    chlorhexidine (PERIDEX) 0.12 % oral rinse 15 mL, 15 mL, Mouth/Throat, Q12H [facility], Eric Ho  CRNP, 15 mL at 07/23/24 0800    dextrose 5 % infusion, 75 mL/hr, Intravenous, Continuous, Junaid Mendoza MD    furosemide (LASIX) injection 80 mg, 80 mg, Intravenous, Once, Junaid Mendoza MD    heparin (porcine) 25,000 units in 0.45% NaCl 250 mL infusion (premix), 3-20 Units/kg/hr (Order-Specific), Intravenous, Titrated, Jeevan Haynes DO, Last Rate: 6.4 mL/hr at 07/23/24 0519, 7.1 Units/kg/hr at 07/23/24 0519    heparin (porcine) injection 2,000 Units, 2,000 Units, Intravenous, Q6H PRN, Jeevan Haynes DO, 2,000 Units at 07/23/24 0521    heparin (porcine) injection 4,000 Units, 4,000 Units, Intravenous, Q6H PRN, Jeevan Haynes DO    hydrocortisone (Solu-CORTEF) injection 50 mg, 50 mg, Intravenous, Q8H FAMILIA, Colleen Gonzalez MD    levalbuterol (XOPENEX) inhalation solution 1.25 mg, 1.25 mg, Nebulization, Q8H PRN, Colleen Gonzalez MD    ondansetron (ZOFRAN) injection 4 mg, 4 mg, Intravenous, Q4H PRN, Jeevan Haynes DO, 4 mg at 07/21/24 1241    pantoprazole (PROTONIX) injection 40 mg, 40 mg, Intravenous, Q24H FAMILIA, Chuckie Cuevas MD, 40 mg at 07/23/24 0800    potassium chloride 20 mEq IVPB (premix), 20 mEq, Intravenous, Once, Junaid Mendoza MD    trimethobenzamide (TIGAN) IM injection 200 mg, 200 mg, Intramuscular, Q6H PRN, Jeevan Haynes DO    vancomycin (VANCOCIN) IVPB (premix in dextrose) 750 mg 150 mL, 750 mg, Intravenous, Daily PRN, Chuckie Cuevas MD    Labs/Data:        Results from last 7 days   Lab Units 07/23/24  0446 07/22/24  0426 07/21/24  1608 07/21/24  0933   WBC Thousand/uL 6.05 9.32  --  6.72   HEMOGLOBIN g/dL 7.6* 8.8*  --  10.2*   HEMATOCRIT % 24.2* 28.1*  --  33.1*   PLATELETS Thousands/uL 105* 187 156 191     Results from last 7 days   Lab Units 07/23/24  0434 07/22/24  1605 07/22/24  0543   POTASSIUM mmol/L 3.6 4.1 4.7   CHLORIDE mmol/L 113* 114* 114*   CO2 mmol/L 21 20* 15*   BUN mg/dL 31* 27* 26*   CREATININE mg/dL 2.71* 2.73* 2.77*     Rosette Pablo PA-C

## 2024-07-23 NOTE — ASSESSMENT & PLAN NOTE
Recent Labs     07/22/24  1605 07/23/24  0434 07/24/24  0436   K 4.1 3.6 3.1*   MG 1.7* 2.2 2.8*     Replete as needed  Monitor electrolytes

## 2024-07-23 NOTE — PROGRESS NOTES
Bhavna Al is a 74 y.o. female who is currently ordered Vancomycin IV with management by the Pharmacy Consult Service.  Relevant clinical data and objective / subjective history reviewed.  Vancomycin Assessment:  Indication and Goal AUC/Trough:  Bone/joint infection (goal -600, trough >10)  Clinical Status: worsening  Micro:      Renal Function:  SCr: 2.71 mg/dL from 2.77 mg/dL yesterday  Renal replacement: Not on dialysis  Days of Therapy: Day 3 of therapy for the admission, but has been on IV vancomycin as an outpatient since 7/1  Current Dose:  750 mg daily PRN vancomycin level </= 15 mcg/dL. Had been receiving 750 mg q24h as an outpatient  Last Level: Random level drawn today @  0434 is 26.6 mcg/dL  Vancomycin Plan:  New Dosing:  Continue pulse dosing with 750 mg daily PRN vancomycin level </= 15 mcg/dL. No dose will be given today.  Next Level: Random level with AM labs on 7/24  Renal Function Monitoring: Daily BMP and UOP assessment  Pharmacy will continue to follow closely for s/sx of nephrotoxicity, infusion reactions and appropriateness of therapy.  BMP and CBC will be ordered per protocol. We will continue to follow the patient’s culture results and clinical progress daily.    Yesi Verdugo, José LuisD, BCCCP  Critical Care and Internal Medicine Clinical Pharmacist  [phone] or via Secure Chat

## 2024-07-23 NOTE — ASSESSMENT & PLAN NOTE
Patient is bed bound, wheelchair bound and she is vamshi lift at facility at baseline.  No further PT needs.

## 2024-07-23 NOTE — PROGRESS NOTES
Progress Note - Infectious Disease   Bhavna Al 74 y.o. female MRN: [id number]  Unit/Bed#: ICU 02 Encounter: [id number]      Impression/Plan:  1.  Shock.  Undefined etiology at this point but must consider the possibility of septic shock.  Consideration for the possibility of a catheter related bacteremia and the PICC line has been removed.  Consideration for possible UTI.  Vasopressor support has slightly been decreased and she continues to receive volume resuscitation.  Consideration for the possibility urinary source with the pyuria.  Consideration for the possibly adrenal insufficiency on this patient with a chronic low-dose steroids being used.  Procalcitonin level was quite high but in the setting of acute kidney injury.  CT chest abdomen pelvis without a definitive source.  Cultures remain negative.  She has now weaned off vasopressor support rapidly with stress dose steroids.  -Continue dose adjusted intravenous cefepime  -Follow-up blood cultures and urine cultures and adjust the antibiotics as needed  -Stress dose steroids  -Possibly discontinue the intravenous antibiotics tomorrow if the patient continues to improve  -Recheck CBC with differential and CMP to make sure no developing toxicities  -Supportive care     2.  MRSA bacteremia.  Undefined primary source but with a chronic rash with some areas of skin breakdown which could be playing a role.  Complicated by thoracic vertebral osteomyelitis as below.  Patient previously cleared her bacteremia and a transthoracic echocardiogram that was without valvular vegetations.  CT of the chest abdomen pelvis without any new complications.  Repeat transthoracic echocardiogram without valvular vegetation appreciated  -Continue vancomycin for now awaiting follow-up blood cultures  -Follow-up repeat blood cultures  -Additional workup as needed     3.  Thoracic vertebral osteomyelitis.  Imaging of the spine in the setting of back pain revealed T9-10  discitis/vertebral osteomyelitis. Likely staphylococcal based on the previous blood culture results.  Sedimentation rate and CRP have been decreasing.  Pain has been improving.  -Continue vancomycin as above for now  -If repeat blood cultures negative likely transition to oral doxycycline tomorrow  -Recheck sedimentation rate and CRP once patient more stabilized     4.  Acute kidney injury.  Most likely prerenal issue.  Consideration for the possibility of ATN.  Modest improvement.  -Dose adjusted antibiotics  -Recheck BMP  -Volume management     5.  Deep tissue injury.  Not overtly infected.  -Offloading and local wound care     6.  Rheumatoid arthritis.  On Plaquenil and prednisone.     7.  Psoriasis.  On topical therapy but needs dermatology follow-up    Discussed with the critical care service the plan to continue the vancomycin and cefepime for now possibly transition to oral doxycycline tomorrow if the cultures remain negative and they agree with the plan    Antibiotics:  Vancomycin 23  Cefepime 3  Antibiotics 46    Subjective:  Patient has no fever, chills, sweats; no nausea, vomiting, diarrhea; no cough, shortness of breath; no pain. No new symptoms.  She is rapidly weaned off vasopressor support.    Objective:  Vitals:  Temp:  [98.4 °F (36.9 °C)-98.6 °F (37 °C)] 98.4 °F (36.9 °C)  HR:  [55-98] 84  Resp:  [21-41] 35  BP: ()/(44-82) 132/82  SpO2:  [96 %-100 %] 100 %  Temp (24hrs), Av.5 °F (36.9 °C), Min:98.4 °F (36.9 °C), Max:98.6 °F (37 °C)  Current: Temperature: 98.4 °F (36.9 °C)    Physical Exam:   General Appearance:  Alert, interactive, nontoxic, no acute distress.   Throat: Oropharynx moist without lesions.    Lungs:   Clear to auscultation bilaterally; no wheezes, rhonchi or rales; respirations unlabored   Heart:  RRR; no murmur, rub or gallop   Abdomen:   Soft, non-tender, non-distended, positive bowel sounds.     Extremities: No clubbing, cyanosis or edema   Skin: Unchanged       Labs,  Imaging, & Other studies:   All pertinent labs and imaging studies were personally reviewed  Results from last 7 days   Lab Units 07/23/24  0446 07/22/24  0426 07/21/24  1608 07/21/24  0933   WBC Thousand/uL 6.05 9.32  --  6.72   HEMOGLOBIN g/dL 7.6* 8.8*  --  10.2*   PLATELETS Thousands/uL 105* 187 156 191     Results from last 7 days   Lab Units 07/23/24  0434 07/22/24  1605 07/22/24  0543 07/21/24  1818 07/21/24  0933   SODIUM mmol/L 147 149* 146   < > 145   POTASSIUM mmol/L 3.6 4.1 4.7   < > 2.7*   CHLORIDE mmol/L 113* 114* 114*   < > 109*   CO2 mmol/L 21 20* 15*   < > 20*   BUN mg/dL 31* 27* 26*   < > 24   CREATININE mg/dL 2.71* 2.73* 2.77*   < > 2.58*   EGFR ml/min/1.73sq m 16 16 16   < > 17   CALCIUM mg/dL 8.0* 8.3* 8.4   < > 8.7   AST U/L  --   --   --   --  27   ALT U/L  --   --   --   --  23   ALK PHOS U/L  --   --   --   --  46    < > = values in this interval not displayed.     Results from last 7 days   Lab Units 07/21/24  0933   BLOOD CULTURE  No Growth at 24 hrs.  No Growth at 24 hrs.     Results from last 7 days   Lab Units 07/22/24  0426 07/21/24  0933   PROCALCITONIN ng/ml 36.36* 40.79*

## 2024-07-23 NOTE — ASSESSMENT & PLAN NOTE
Trops trended 1374- 1239  No ischemic changes on EKG last night but Afib RVR rate somewhat masks the ST segment. This morning no ischemic changes on the telemetry.  Non-MI troponin elevation secondary to sepsis, acute kidney injury superimposed on chronic kidney disease and tachycardia   Limited echo 7/22 with no wall motion abnormalities

## 2024-07-23 NOTE — PROGRESS NOTES
Complex venous access for midline. GFR<45 need nephrology clearance for midline insertion. Bilateral upper extremities increased LROM noted from previous picc insertion, would be difficult to maintain adequate position for midline insertion. Only left basilic vein adequate for midline insertion. No peripheral veins noted LAC and LFA for piv. Right upper extremities only brachial and basilic veins potential for midline access, however veins are >2cm deep and potential unable to access. RFA anterior one vein noted for piv access, however area tender to touch, erythema and ecchymotic. Above assessment reported to CCM and primary Rn E. Bone.

## 2024-07-23 NOTE — ASSESSMENT & PLAN NOTE
Home med is lopressor 25 mg BID, currently on hold  Cardiology consulted  Continuous cardiopulmonary monitor

## 2024-07-23 NOTE — PLAN OF CARE
Problem: Prexisting or High Potential for Compromised Skin Integrity  Goal: Skin integrity is maintained or improved  Description: INTERVENTIONS:  - Identify patients at risk for skin breakdown  - Assess and monitor skin integrity  - Assess and monitor nutrition and hydration status  - Monitor labs   - Assess for incontinence   - Turn and reposition patient  - Assist with mobility/ambulation  - Relieve pressure over bony prominences  - Avoid friction and shearing  - Provide appropriate hygiene as needed including keeping skin clean and dry  - Evaluate need for skin moisturizer/barrier cream  - Collaborate with interdisciplinary team   - Patient/family teaching  - Consider wound care consult   Outcome: Progressing     Problem: CARDIOVASCULAR - ADULT  Goal: Maintains optimal cardiac output and hemodynamic stability  Description: INTERVENTIONS:  - Monitor I/O, vital signs and rhythm  - Monitor for S/S and trends of decreased cardiac output  - Administer and titrate ordered vasoactive medications to optimize hemodynamic stability  - Assess quality of pulses, skin color and temperature  - Assess for signs of decreased coronary artery perfusion  - Instruct patient to report change in severity of symptoms  Outcome: Progressing  Goal: Absence of cardiac dysrhythmias or at baseline rhythm  Description: INTERVENTIONS:  - Continuous cardiac monitoring, vital signs, obtain 12 lead EKG if ordered  - Administer antiarrhythmic and heart rate control medications as ordered  - Monitor electrolytes and administer replacement therapy as ordered  Outcome: Progressing     Problem: METABOLIC, FLUID AND ELECTROLYTES - ADULT  Goal: Electrolytes maintained within normal limits  Description: INTERVENTIONS:  - Monitor labs and assess patient for signs and symptoms of electrolyte imbalances  - Administer electrolyte replacement as ordered  - Monitor response to electrolyte replacements, including repeat lab results as appropriate  - Instruct  patient on fluid and nutrition as appropriate  Outcome: Progressing  Goal: Fluid balance maintained  Description: INTERVENTIONS:  - Monitor labs   - Monitor I/O and WT  - Instruct patient on fluid and nutrition as appropriate  - Assess for signs & symptoms of volume excess or deficit  Outcome: Progressing  Goal: Glucose maintained within target range  Description: INTERVENTIONS:  - Monitor Blood Glucose as ordered  - Assess for signs and symptoms of hyperglycemia and hypoglycemia  - Administer ordered medications to maintain glucose within target range  - Assess nutritional intake and initiate nutrition service referral as needed  Outcome: Progressing     Problem: HEMATOLOGIC - ADULT  Goal: Maintains hematologic stability  Description: INTERVENTIONS  - Assess for signs and symptoms of bleeding or hemorrhage  - Monitor labs  - Administer supportive blood products/factors as ordered and appropriate  Outcome: Progressing

## 2024-07-23 NOTE — PROGRESS NOTES
[facility]  Progress Note  Name: Bhavna Al I  MRN: [id number]  Unit/Bed#: ICU 02 I Date of Admission: 7/21/2024   Date of Service: 7/23/2024 I Hospital Day: 2    Assessment & Plan   Shock ([facility])  Assessment & Plan  Recent Labs     07/21/24  0933 07/22/24  0426   PROCALCITONI 40.79* 36.36*     Recent Labs     07/21/24  0933   BLOODCX No Growth at 24 hrs.  No Growth at 24 hrs.     Recent Labs     07/22/24  1128 07/22/24  1702 07/22/24  1932   LACTICACID 3.7* 3.3* 2.7*     Undifferentiated  Vanco level is above goal so she will not need vancomycin at this moment.  Infectious disease on board, appreciate recommendations  Volume resuscitation completed  Echo shows EF is >75%. LV cavity size is normal. Systolic function is hyperdynamic. Severe annular mitral valve calcification. There is trace regurgitation.    Plan:  Continue cefepime day 3  Stress dose steroid - decadron at 50 mg every 6 hours  Follow lactic acid till <2  Follow blood culture results  Check ESR and crp once patient more stable.     * Sepsis ([facility])  Assessment & Plan  WBC count normal  Procal elevated but downtrending  Bicarb uptrended to normal after bicarb with D5  drip    Plan:  Continue cefepime   Vanco level above goal  Monitor white count  Continuous cardiopulmonary monitoring    Hypotension  Assessment & Plan  Possibly secondary to sepsis  Currently on Levophed and vasopressin. Wean off of pressors.  Hold digoxin   Cardiology on board  Maintain mean arterial pressure 65 or greater    SIRS (systemic inflammatory response syndrome) ([facility])  Assessment & Plan  Monitor white count  Monitor I's and O's  Monitor fever curve  Continue cefepime      AMANDA (acute kidney injury) ([facility])  Assessment & Plan  Recent Labs     07/22/24  0543 07/22/24  1605 07/23/24  0434   CREATININE 2.77* 2.73* 2.71*   EGFR 16 16 16       Creatinine baseline is 1-1.2  Monitor kidney indices  Avoid nephrotoxic medications  Currently stable      Elevated  troponin  Assessment & Plan  Lab Results   Component Value Date    HSTNI0 1,374 (H) 07/21/2024    HSTNI2 1,239 (H) 07/21/2024    HSTNID2 -135 07/21/2024    HSTNI4 364 (H) 06/29/2024    HSTNID4 84 (H) 06/29/2024     Cardiology consulted  Continue to trend troponins  Continuous cardiopulmonary monitoring      Hypoxia  Assessment & Plan  Requiring 2 L of oxygen and intermittently sounding wet when given IV fluids.  Will check chest xray today    Chronic pruritic rash in adult  Assessment & Plan  Diagnosis psoriasis proven by biopsy  Continue to use Lac-Hydrin lotion as needed    Persistent atrial fibrillation with RVR (HCC)  Assessment & Plan  Home med is lopressor 25 mg BID, currently on hold  Admit to the intensive care unit  Cardiology consulted  Continuous cardiopulmonary monitor    Deep tissue injury  Assessment & Plan  Float heels  Monitor skin  Continue Lac-Hydrin as needed  Consider wound consult if needed    Ambulatory dysfunction  Assessment & Plan  Consult physical therapy  Consult Occupational Therapy      Acute on chronic diastolic heart failure, LVEF 65%, LVIDd 3.3 cm, AHA Stage C  Assessment & Plan  Wt Readings from Last 3 Encounters:   07/23/24 95.7 kg (210 lb 15.7 oz)   07/11/24 108 kg (238 lb)   07/05/24 108 kg (238 lb 1.6 oz)               Morbid obesity (HCC)  Assessment & Plan  Recommend decrease caloric intake and increase activity    Rheumatoid arthritis (HCC)  Assessment & Plan  Continue Tylenol 650 mg p.o. every 6 hours  Continue Plaquenil    Hypokalemia-resolved as of 7/22/2024  Assessment & Plan  Recent Labs     07/22/24  0426 07/22/24  0543 07/22/24  1605 07/23/24  0434   K 5.4* 4.7 4.1 3.6   MG 1.9  --  1.7* 2.2       Replete as needed  Monitor electrolytes         Disposition: Critical care    ICU Core Measures     A: Assess, Prevent, and Manage Pain Has pain been assessed? Yes  Need for changes to pain regimen? No   B: Both SAT/SAT  N/A   C: Choice of Sedation RASS Goal: N/A patient not  on sedation  Need for changes to sedation or analgesia regimen? No   D: Delirium CAM-ICU: Negative   E: Early Mobility  Plan for early mobility? Yes   F: Family Engagement Plan for family engagement today? Yes       Antibiotic Review: Awaiting culture results.     Review of Invasive Devices:    Corey Plan: Continue for accurate I/O monitoring for 48 hours  Central access plan: Medications requiring central line  Nusrat Plan: Keep arterial line for hemodynamic monitoring and frequent labs    Prophylaxis:  VTE VTE covered by:  heparin (porcine), Intravenous, 7.1 Units/kg/hr at 07/23/24 0519  heparin (porcine), Intravenous, 2,000 Units at 07/23/24 0521  heparin (porcine), Intravenous       Stress Ulcer  covered bypantoprazole (PROTONIX) 40 mg tablet [[id number]] (Long-Term Med), pantoprazole (PROTONIX) EC tablet 40 mg [[id number]]         Significant 24hr Events     24hr events: Patient is weaned off of vasopressin and levophed is down to 4 mcg. She is having a dry cough this morning.        Subjective   Review of Systems: Review of Systems   Unable to perform ROS: Mental status change        Objective                            Vitals I/O      Most Recent Min/Max in 24hrs   Temp 98.5 °F (36.9 °C) Temp  Min: 98.4 °F (36.9 °C)  Max: 99.2 °F (37.3 °C)   Pulse 97 Pulse  Min: 55  Max: 103   Resp 21 Resp  Min: 21  Max: 41   /82 BP  Min: 89/59  Max: 132/82   O2 Sat 100 % SpO2  Min: 96 %  Max: 100 %      Intake/Output Summary (Last 24 hours) at 7/23/2024 0802  Last data filed at 7/23/2024 0600  Gross per 24 hour   Intake 3159.1 ml   Output 505 ml   Net 2654.1 ml       Diet NPO    Invasive Monitoring   Arterial Line  Nusrat /49  Arterial Line BP  Min: 81/38  Max: 148/60   MAP 76 mmHg  Arterial Line MAP (mmHg)  Min: 54 mmHg  Max: 92 mmHg           Physical Exam   Physical Exam  Vitals and nursing note reviewed.   Eyes:      Extraocular Movements: Extraocular movements intact.      Conjunctiva/sclera: Conjunctivae  normal.      Pupils: Pupils are equal, round, and reactive to light.   Skin:     General: Skin is warm, dry and not mottled extremities.      Capillary Refill: Capillary refill takes less than 2 seconds.      Coloration: Skin is not jaundiced or pale.      Findings: Erythema and rash present. No lesion or wound.      Comments: Diffuse erythema -- confirmed psoriasis on biopsy  Hyperalgesic to touch   HENT:      Head: Normocephalic and atraumatic.      Right Ear: Hearing normal.      Left Ear: Hearing normal.      Mouth/Throat:      Mouth: Mucous membranes are moist.   Neck:      Vascular: No JVD.   no midline tenderness Cardiovascular:      Rate and Rhythm: Normal rate and regular rhythm.      Pulses: No decreased pulses.      Heart sounds: No murmur heard.     No friction rub. No gallop.   Musculoskeletal:         General: No swelling, tenderness, deformity or signs of injury. Normal range of motion.      Right lower leg: Trace Edema present.      Left lower leg: Edema present.   Abdominal: General: Abdomen is protuberant. Bowel sounds are decreased. There is no distension.      Palpations: Abdomen is soft.      Tenderness: There is no abdominal tenderness.   Constitutional:       General: She is not in acute distress.     Appearance: She is well-developed and well-nourished. She is ill-appearing and toxic-appearing.      Interventions: She is not sedated, not intubated and not restrained.  Pulmonary:      Effort: Tachypnea present. No accessory muscle usage, respiratory distress or accessory muscle usage. She is not intubated.      Breath sounds: No wheezing, rhonchi or rales.   Psychiatric:         Mood and Affect:  mood and affect abnormal.  Neurological:      Mental Status: She is lethargic.      GCS: GCS eye subscore is 3. GCS verbal subscore is 4. GCS motor subscore is 6.      Cranial Nerves: No facial asymmetry.      Sensory: Sensation is intact.   Genitourinary/Anorectal:  Corey present.          Diagnostic  Studies      EKG: Atrial fibrillation, HR 89  Imaging: Echo shows EF 75%. Severe mitral valve calcification with trace regurgitation. I have personally reviewed pertinent reports.       Medications:  Scheduled PRN   ammonium lactate, , BID  cefepime, 1,000 mg, Q12H  chlorhexidine, 15 mL, Q12H FAMILIA  hydrocortisone sodium succinate, 50 mg, Q6H FAMILIA  pantoprazole, 40 mg, Q24H FAMILIA      acetaminophen, 650 mg, Q4H PRN  albuterol, 2 puff, Q6H PRN  heparin (porcine), 2,000 Units, Q6H PRN  heparin (porcine), 4,000 Units, Q6H PRN  levalbuterol, 1.25 mg, Q8H PRN  ondansetron, 4 mg, Q4H PRN  trimethobenzamide, 200 mg, Q6H PRN  vancomycin, 750 mg, Daily PRN       Continuous    heparin (porcine), 3-20 Units/kg/hr (Order-Specific), Last Rate: 7.1 Units/kg/hr (07/23/24 0519)  norepinephrine, 1-30 mcg/min, Last Rate: Stopped (07/23/24 0717)  vasopressin, 0.04 Units/min, Last Rate: Stopped (07/23/24 0440)         Labs:    CBC    Recent Labs     07/22/24  0426 07/23/24  0446   WBC 9.32 6.05   HGB 8.8* 7.6*   HCT 28.1* 24.2*    105*   BANDSPCT 12*  --      BMP    Recent Labs     07/22/24  1605 07/23/24  0434   SODIUM 149* 147   K 4.1 3.6   * 113*   CO2 20* 21   AGAP 15* 13   BUN 27* 31*   CREATININE 2.73* 2.71*   CALCIUM 8.3* 8.0*       Coags    Recent Labs     07/21/24  0933 07/21/24  2151 07/22/24  1932 07/23/24  0434   INR 1.83*  --   --   --    PTT 37   < > 64* 51*    < > = values in this interval not displayed.        Additional Electrolytes  Recent Labs     07/21/24  1818 07/22/24  0426 07/22/24  1605 07/23/24  0434   MG 1.2*   < > 1.7* 2.2   PHOS 2.9  --   --   --     < > = values in this interval not displayed.          Blood Gas    No recent results  Recent Labs     07/21/24  2356   PHVEN 7.343   JTQ0DCQ 36.0*   PO2VEN 43.0   XJA9TWO 19.1*   BEVEN -6.0   O2WHTNK 77.3    LFTs  Recent Labs     07/21/24  0933   ALT 23   AST 27   ALKPHOS 46   ALB 2.5*   TBILI 0.60       Infectious  Recent Labs     07/21/24  0933  07/22/24 0426   PROCALCITONI 40.79* 36.36*     Glucose  Recent Labs     07/22/24  0426 07/22/24  0543 07/22/24  1605 07/23/24  0434   GLUC 46* 126 112 135               Junaid Mendoza MD

## 2024-07-23 NOTE — QUICK NOTE
Spoke with granddaughter on the phone. She states at baseline Bhavna is able to converse and likes to talk. She has not liked living at the nursing facility where she has lived for 2 years and has verbalized several times. She has a roommate who is also not very talkative which might have caused her to feel more isolated. She has stated that she does not like the food at the facility. She was able to move around in a wheelchair previously, but recently has been more bed-bound. She is usually able to feed herself but in the recent past few days she was requiring to be fed. She does receive ensure at the facility. She is not usually hyperalgesic to light touch which appears to be a new issue for her. She was on a soft diet at the facility.  She is able to state her needs. Unsure if she has incontinence now since previously she was taken to the bathroom but now has to go in the bed. She does have good recent memory but recently had an episode where she did not recognize her daughter Rhoda and granddaughter Bhavna but was able to recognize granddaughter Devorah.   Update Devorah about her health.

## 2024-07-23 NOTE — ASSESSMENT & PLAN NOTE
Recent Labs     07/22/24  1605 07/23/24  0434 07/24/24  0436   CREATININE 2.73* 2.71* 2.65*   EGFR 16 16 16     CKD stage 3a  Creatinine baseline is 1-1.2  Monitor kidney indices  Avoid nephrotoxic medications  Improving

## 2024-07-23 NOTE — OCCUPATIONAL THERAPY NOTE
Occupational Therapy Screen:    Patient Name: Bhavna Al  Today's Date: 7/23/2024    OT consult received. Chart reviewed. Per chart: Pt is LTC resident at [facility]. Pt is a Mirian lift, non ambulatory, wheel chair bound, assist of 1-2 for ADLs. No skilled acute IP OT needs identified at this time. Will D/C OT.     Devorah Watkins, OTR/L

## 2024-07-23 NOTE — ASSESSMENT & PLAN NOTE
Pulmonary edema likely due to Afib RVR - Resolved  At [facility] was Rx Lasix 20 mg QD  ICU intermittently dosing IV Lasix.

## 2024-07-23 NOTE — ASSESSMENT & PLAN NOTE
Recent Labs     07/21/24  0933 07/22/24  0426   PROCALCITONI 40.79* 36.36*     Undifferentiated  Vanco level is above goal so she will not need vancomycin at this moment.  Infectious disease on board, appreciate recommendations  Volume resuscitation completed  Echo shows EF is >75%. LV cavity size is normal. Systolic function is hyperdynamic. Severe annular mitral valve calcification. There is trace regurgitation.  Lactic acid cleared  Blood cultures -ve 48 hours  Urine cultures <1000 cfu  ESR normal and CRP is elevated    Plan:  Continue cefepime day 4. Discontinue today.  Switch vancomycin to doxycycline  Stress dose steroid discontinued, today 6 am last dose  Cosyntropin test tomorrow a.m. to check for adrenal insufficiency.

## 2024-07-23 NOTE — ASSESSMENT & PLAN NOTE
Possibly secondary to sepsis  Weaned off of pressors.  Hold digoxin   Cardiology on board  Maintain mean arterial pressure 65 or greater

## 2024-07-23 NOTE — ASSESSMENT & PLAN NOTE
Requiring 2 L of oxygen and intermittently sounding wet when given IV fluids.  Chest xray showing pulmonary vascular congestion. Mild persistent right upper lobe opacities may be due to edema or pneumonia. No new abnormality.   Now on room air

## 2024-07-23 NOTE — ASSESSMENT & PLAN NOTE
Lab Results   Component Value Date    HSTNI0 1,374 (H) 07/21/2024    HSTNI2 1,239 (H) 07/21/2024    HSTNID2 -135 07/21/2024    HSTNI4 364 (H) 06/29/2024    HSTNID4 84 (H) 06/29/2024     Cardiology consulted  Continuous cardiopulmonary monitoring

## 2024-07-23 NOTE — PHYSICAL THERAPY NOTE
Physical Therapy Screen    Patient Name: Bhavna Al    Today's Date: 2024     Problem List  Principal Problem:    Sepsis (HCC)  Active Problems:    SIRS (systemic inflammatory response syndrome) (HCC)    Hypoalbuminemia    Rheumatoid arthritis (HCC)    Morbid obesity (HCC)    Shock (HCC)    Elevated troponin    Acute on chronic diastolic heart failure, LVEF 65%, LVIDd 3.3 cm, AHA Stage C    Ambulatory dysfunction    Deep tissue injury    Hypotension    Persistent atrial fibrillation    AMANDA (acute kidney injury) (HCC)    Chronic pruritic rash in adult    Hypoxia       Past Medical History  Past Medical History:   Diagnosis Date    Abnormal thyroid function test     last assessed: 2015     Arthritis     Caries     last assessed: 2016     Continuous opioid dependence (HCC) 2021    Edema of right lower extremity     last assessed: 2015     GERD (gastroesophageal reflux disease)     Hypertension     Medicare annual wellness visit, subsequent 2021    Positive blood culture 3/11/2022    Sarcoid         Past Surgical History  Past Surgical History:   Procedure Laterality Date     SECTION      IR NON-TUNNELED CENTRAL LINE PLACEMENT  2024    IR PICC PLACEMENT SINGLE LUMEN  2024    IR PICC PLACEMENT SINGLE LUMEN  2024    MULTIPLE TOOTH EXTRACTIONS N/A 2016    Procedure: Surgical extraction of teeth 2, 18, 19, 30, 31; incision and drainage of left subperiosteal abscess ;  Surgeon: Clara Cevallos DMD;  Location: [facility];  Service:          24 1630   PT Last Visit   PT Visit Date 24   Note Type   Note type Screen   Additional Comments PT consult received. Chart reviewed. Per chart: Pt is LTC resident at [facility]. Pt is a Mirian lift, non ambulatory, wheel chair bound, assist of 1-2 for ADLs. No skilled acute IP PT needs identified at this time. Will D/C PT.     Prieto  Madsen  07/23/24

## 2024-07-24 ENCOUNTER — APPOINTMENT (INPATIENT)
Dept: NON INVASIVE DIAGNOSTICS | Facility: HOSPITAL | Age: 74
DRG: 871 | End: 2024-07-24
Payer: MEDICARE

## 2024-07-24 PROBLEM — N18.9 ACUTE KIDNEY INJURY SUPERIMPOSED ON CHRONIC KIDNEY DISEASE  (HCC): Status: ACTIVE | Noted: 2024-07-21

## 2024-07-24 PROBLEM — R09.02 HYPOXIA: Status: RESOLVED | Noted: 2024-07-23 | Resolved: 2024-07-24

## 2024-07-24 LAB
ALBUMIN SERPL BCG-MCNC: 3 G/DL (ref 3.5–5)
ALP SERPL-CCNC: 43 U/L (ref 34–104)
ALT SERPL W P-5'-P-CCNC: 30 U/L (ref 7–52)
ANION GAP SERPL CALCULATED.3IONS-SCNC: 10 MMOL/L (ref 4–13)
ANION GAP SERPL CALCULATED.3IONS-SCNC: 7 MMOL/L (ref 4–13)
ANISOCYTOSIS BLD QL SMEAR: PRESENT
APTT PPP: 48 SECONDS (ref 23–37)
APTT PPP: 50 SECONDS (ref 23–37)
AST SERPL W P-5'-P-CCNC: 39 U/L (ref 13–39)
BASE EX.OXY STD BLDV CALC-SCNC: 54.9 % (ref 60–80)
BASE EXCESS BLDV CALC-SCNC: -2.2 MMOL/L
BILIRUB DIRECT SERPL-MCNC: 0.16 MG/DL (ref 0–0.2)
BILIRUB SERPL-MCNC: 0.49 MG/DL (ref 0.2–1)
BLD SMEAR INTERP: NORMAL
BUN SERPL-MCNC: 35 MG/DL (ref 5–25)
BUN SERPL-MCNC: 35 MG/DL (ref 5–25)
CALCIUM SERPL-MCNC: 8.6 MG/DL (ref 8.4–10.2)
CALCIUM SERPL-MCNC: 8.8 MG/DL (ref 8.4–10.2)
CHLORIDE SERPL-SCNC: 110 MMOL/L (ref 96–108)
CHLORIDE SERPL-SCNC: 111 MMOL/L (ref 96–108)
CO2 SERPL-SCNC: 24 MMOL/L (ref 21–32)
CO2 SERPL-SCNC: 25 MMOL/L (ref 21–32)
CREAT SERPL-MCNC: 2.57 MG/DL (ref 0.6–1.3)
CREAT SERPL-MCNC: 2.65 MG/DL (ref 0.6–1.3)
CRP SERPL QL: 130.1 MG/L
D DIMER PPP FEU-MCNC: 1.11 UG/ML FEU
DACRYOCYTES BLD QL SMEAR: PRESENT
DAT POLY-SP REAG RBC QL: NEGATIVE
ERYTHROCYTE [DISTWIDTH] IN BLOOD BY AUTOMATED COUNT: 17 % (ref 11.6–15.1)
ERYTHROCYTE [SEDIMENTATION RATE] IN BLOOD: 19 MM/HOUR (ref 0–29)
FDP BLD QL AGGL: <10
FIBRINOGEN PPP-MCNC: 284 MG/DL (ref 207–520)
GFR SERPL CREATININE-BSD FRML MDRD: 16 ML/MIN/1.73SQ M
GFR SERPL CREATININE-BSD FRML MDRD: 17 ML/MIN/1.73SQ M
GLUCOSE SERPL-MCNC: 106 MG/DL (ref 65–140)
GLUCOSE SERPL-MCNC: 108 MG/DL (ref 65–140)
GLUCOSE SERPL-MCNC: 116 MG/DL (ref 65–140)
GLUCOSE SERPL-MCNC: 118 MG/DL (ref 65–140)
GLUCOSE SERPL-MCNC: 126 MG/DL (ref 65–140)
GLUCOSE SERPL-MCNC: 90 MG/DL (ref 65–140)
GLUCOSE SERPL-MCNC: 97 MG/DL (ref 65–140)
HCO3 BLDV-SCNC: 23.1 MMOL/L (ref 24–30)
HCT VFR BLD AUTO: 24.4 % (ref 34.8–46.1)
HELMET CELLS BLD QL SMEAR: PRESENT
HGB BLD-MCNC: 7.6 G/DL (ref 11.5–15.4)
HYPERCHROMIA BLD QL SMEAR: PRESENT
INR PPP: 1.46 (ref 0.84–1.19)
INR PPP: 1.5 (ref 0.84–1.19)
LDH SERPL-CCNC: 296 U/L (ref 140–271)
LYMPHOCYTES # BLD AUTO: 0.31 THOUSAND/UL (ref 0.6–4.47)
LYMPHOCYTES # BLD AUTO: 6 %
MAGNESIUM SERPL-MCNC: 2.7 MG/DL (ref 1.9–2.7)
MAGNESIUM SERPL-MCNC: 2.8 MG/DL (ref 1.9–2.7)
MCH RBC QN AUTO: 27.5 PG (ref 26.8–34.3)
MCHC RBC AUTO-ENTMCNC: 31.1 G/DL (ref 31.4–37.4)
MCV RBC AUTO: 88 FL (ref 82–98)
MONOCYTES # BLD AUTO: 0.18 THOUSAND/UL (ref 0–1.22)
MONOCYTES NFR BLD AUTO: 4 % (ref 4–12)
NEUTS BAND NFR BLD MANUAL: 2 % (ref 0–8)
NEUTS SEG # BLD: 3.92 THOUSAND/UL (ref 1.81–6.82)
NEUTS SEG NFR BLD AUTO: 87 %
O2 CT BLDV-SCNC: 6.7 ML/DL
OVALOCYTES BLD QL SMEAR: PRESENT
PCO2 BLDV: 42.4 MM HG (ref 42–50)
PH BLDV: 7.36 [PH] (ref 7.3–7.4)
PHOSPHATE SERPL-MCNC: 2.4 MG/DL (ref 2.3–4.1)
PLATELET # BLD AUTO: 81 THOUSANDS/UL (ref 149–390)
PLATELET # BLD AUTO: 89 THOUSANDS/UL (ref 149–390)
PLATELET BLD QL SMEAR: ABNORMAL
PMV BLD AUTO: 12.3 FL (ref 8.9–12.7)
PMV BLD AUTO: 13.1 FL (ref 8.9–12.7)
PO2 BLDV: 31.9 MM HG (ref 35–45)
POTASSIUM SERPL-SCNC: 3.1 MMOL/L (ref 3.5–5.3)
POTASSIUM SERPL-SCNC: 3.9 MMOL/L (ref 3.5–5.3)
PROT SERPL-MCNC: 5.4 G/DL (ref 6.4–8.4)
PROTHROMBIN TIME: 18 SECONDS (ref 11.6–14.5)
PROTHROMBIN TIME: 18.3 SECONDS (ref 11.6–14.5)
RBC # BLD AUTO: 2.76 MILLION/UL (ref 3.81–5.12)
RETICS # AUTO: NORMAL 10*3/UL (ref 14097–95744)
RETICS # CALC: 0.8 % (ref 0.37–1.87)
SCHISTOCYTES BLD QL SMEAR: PRESENT
SODIUM SERPL-SCNC: 143 MMOL/L (ref 135–147)
SODIUM SERPL-SCNC: 144 MMOL/L (ref 135–147)
TARGETS BLD QL SMEAR: PRESENT
TOTAL CELLS COUNTED SPEC: 100
VANCOMYCIN SERPL-MCNC: 23.3 UG/ML (ref 10–20)
VARIANT LYMPHS # BLD AUTO: 1 % (ref 0–0)
WBC # BLD AUTO: 4.41 THOUSAND/UL (ref 4.31–10.16)

## 2024-07-24 PROCEDURE — 85652 RBC SED RATE AUTOMATED: CPT

## 2024-07-24 PROCEDURE — 83735 ASSAY OF MAGNESIUM: CPT

## 2024-07-24 PROCEDURE — 85384 FIBRINOGEN ACTIVITY: CPT | Performed by: STUDENT IN AN ORGANIZED HEALTH CARE EDUCATION/TRAINING PROGRAM

## 2024-07-24 PROCEDURE — 80202 ASSAY OF VANCOMYCIN: CPT | Performed by: INTERNAL MEDICINE

## 2024-07-24 PROCEDURE — 86880 COOMBS TEST DIRECT: CPT | Performed by: STUDENT IN AN ORGANIZED HEALTH CARE EDUCATION/TRAINING PROGRAM

## 2024-07-24 PROCEDURE — 85049 AUTOMATED PLATELET COUNT: CPT | Performed by: STUDENT IN AN ORGANIZED HEALTH CARE EDUCATION/TRAINING PROGRAM

## 2024-07-24 PROCEDURE — 85027 COMPLETE CBC AUTOMATED: CPT

## 2024-07-24 PROCEDURE — 85300 ANTITHROMBIN III ACTIVITY: CPT | Performed by: STUDENT IN AN ORGANIZED HEALTH CARE EDUCATION/TRAINING PROGRAM

## 2024-07-24 PROCEDURE — 83615 LACTATE (LD) (LDH) ENZYME: CPT | Performed by: STUDENT IN AN ORGANIZED HEALTH CARE EDUCATION/TRAINING PROGRAM

## 2024-07-24 PROCEDURE — 99291 CRITICAL CARE FIRST HOUR: CPT | Performed by: INTERNAL MEDICINE

## 2024-07-24 PROCEDURE — 82948 REAGENT STRIP/BLOOD GLUCOSE: CPT

## 2024-07-24 PROCEDURE — 84100 ASSAY OF PHOSPHORUS: CPT

## 2024-07-24 PROCEDURE — 85362 FIBRIN DEGRADATION PRODUCTS: CPT | Performed by: STUDENT IN AN ORGANIZED HEALTH CARE EDUCATION/TRAINING PROGRAM

## 2024-07-24 PROCEDURE — 99449 NTRPROF PH1/NTRNET/EHR 31/>: CPT | Performed by: INTERNAL MEDICINE

## 2024-07-24 PROCEDURE — 83735 ASSAY OF MAGNESIUM: CPT | Performed by: INTERNAL MEDICINE

## 2024-07-24 PROCEDURE — 83010 ASSAY OF HAPTOGLOBIN QUANT: CPT

## 2024-07-24 PROCEDURE — 85379 FIBRIN DEGRADATION QUANT: CPT | Performed by: STUDENT IN AN ORGANIZED HEALTH CARE EDUCATION/TRAINING PROGRAM

## 2024-07-24 PROCEDURE — 92610 EVALUATE SWALLOWING FUNCTION: CPT

## 2024-07-24 PROCEDURE — 85045 AUTOMATED RETICULOCYTE COUNT: CPT | Performed by: STUDENT IN AN ORGANIZED HEALTH CARE EDUCATION/TRAINING PROGRAM

## 2024-07-24 PROCEDURE — 80076 HEPATIC FUNCTION PANEL: CPT

## 2024-07-24 PROCEDURE — 85610 PROTHROMBIN TIME: CPT

## 2024-07-24 PROCEDURE — 85007 BL SMEAR W/DIFF WBC COUNT: CPT

## 2024-07-24 PROCEDURE — 86140 C-REACTIVE PROTEIN: CPT

## 2024-07-24 PROCEDURE — 85730 THROMBOPLASTIN TIME PARTIAL: CPT | Performed by: STUDENT IN AN ORGANIZED HEALTH CARE EDUCATION/TRAINING PROGRAM

## 2024-07-24 PROCEDURE — 80048 BASIC METABOLIC PNL TOTAL CA: CPT

## 2024-07-24 PROCEDURE — 99233 SBSQ HOSP IP/OBS HIGH 50: CPT | Performed by: INTERNAL MEDICINE

## 2024-07-24 PROCEDURE — 85730 THROMBOPLASTIN TIME PARTIAL: CPT | Performed by: INTERNAL MEDICINE

## 2024-07-24 PROCEDURE — 82805 BLOOD GASES W/O2 SATURATION: CPT

## 2024-07-24 PROCEDURE — 85420 FIBRINOLYTIC PLASMINOGEN: CPT | Performed by: STUDENT IN AN ORGANIZED HEALTH CARE EDUCATION/TRAINING PROGRAM

## 2024-07-24 PROCEDURE — 85610 PROTHROMBIN TIME: CPT | Performed by: STUDENT IN AN ORGANIZED HEALTH CARE EDUCATION/TRAINING PROGRAM

## 2024-07-24 RX ORDER — PANTOPRAZOLE SODIUM 40 MG/1
40 TABLET, DELAYED RELEASE ORAL
Status: DISCONTINUED | OUTPATIENT
Start: 2024-07-25 | End: 2024-07-27

## 2024-07-24 RX ORDER — POTASSIUM CHLORIDE 29.8 MG/ML
40 INJECTION INTRAVENOUS ONCE
Status: COMPLETED | OUTPATIENT
Start: 2024-07-24 | End: 2024-07-25

## 2024-07-24 RX ORDER — HYDROMORPHONE HCL IN WATER/PF 6 MG/30 ML
0.2 PATIENT CONTROLLED ANALGESIA SYRINGE INTRAVENOUS ONCE AS NEEDED
Status: COMPLETED | OUTPATIENT
Start: 2024-07-24 | End: 2024-07-24

## 2024-07-24 RX ORDER — BUMETANIDE 0.25 MG/ML
2 INJECTION INTRAMUSCULAR; INTRAVENOUS ONCE
Status: CANCELLED | OUTPATIENT
Start: 2024-07-24

## 2024-07-24 RX ORDER — BUMETANIDE 0.25 MG/ML
2 INJECTION INTRAMUSCULAR; INTRAVENOUS ONCE
Status: COMPLETED | OUTPATIENT
Start: 2024-07-24 | End: 2024-07-24

## 2024-07-24 RX ORDER — POTASSIUM CHLORIDE 29.8 MG/ML
40 INJECTION INTRAVENOUS ONCE
Status: COMPLETED | OUTPATIENT
Start: 2024-07-24 | End: 2024-07-24

## 2024-07-24 RX ORDER — DOXYCYCLINE HYCLATE 100 MG/1
100 CAPSULE ORAL EVERY 12 HOURS SCHEDULED
Status: DISCONTINUED | OUTPATIENT
Start: 2024-07-24 | End: 2024-07-28

## 2024-07-24 RX ORDER — COSYNTROPIN 0.25 MG/ML
0.25 INJECTION, POWDER, FOR SOLUTION INTRAMUSCULAR; INTRAVENOUS ONCE
Status: DISCONTINUED | OUTPATIENT
Start: 2024-07-25 | End: 2024-07-24

## 2024-07-24 RX ORDER — BUMETANIDE 0.25 MG/ML
1 INJECTION INTRAMUSCULAR; INTRAVENOUS ONCE
Status: CANCELLED | OUTPATIENT
Start: 2024-07-24

## 2024-07-24 RX ORDER — COSYNTROPIN 0.25 MG/ML
0.25 INJECTION, POWDER, FOR SOLUTION INTRAMUSCULAR; INTRAVENOUS ONCE
Status: COMPLETED | OUTPATIENT
Start: 2024-07-25 | End: 2024-07-25

## 2024-07-24 RX ADMIN — BUMETANIDE 2 MG: 0.25 INJECTION INTRAMUSCULAR; INTRAVENOUS at 12:56

## 2024-07-24 RX ADMIN — Medication: at 17:26

## 2024-07-24 RX ADMIN — DOXYCYCLINE 100 MG: 100 CAPSULE ORAL at 11:36

## 2024-07-24 RX ADMIN — BUMETANIDE 2 MG: 0.25 INJECTION INTRAMUSCULAR; INTRAVENOUS at 11:43

## 2024-07-24 RX ADMIN — Medication 12.5 MG: at 12:58

## 2024-07-24 RX ADMIN — HYDROCORTISONE SODIUM SUCCINATE 50 MG: 100 INJECTION, POWDER, FOR SOLUTION INTRAMUSCULAR; INTRAVENOUS at 04:31

## 2024-07-24 RX ADMIN — APIXABAN 5 MG: 5 TABLET, FILM COATED ORAL at 11:36

## 2024-07-24 RX ADMIN — HYDROMORPHONE HYDROCHLORIDE 0.2 MG: 0.2 INJECTION, SOLUTION INTRAMUSCULAR; INTRAVENOUS; SUBCUTANEOUS at 18:04

## 2024-07-24 RX ADMIN — POTASSIUM CHLORIDE 40 MEQ: 29.8 INJECTION, SOLUTION INTRAVENOUS at 09:03

## 2024-07-24 RX ADMIN — Medication: at 08:11

## 2024-07-24 RX ADMIN — DEXTROSE 75 ML/HR: 5 SOLUTION INTRAVENOUS at 11:39

## 2024-07-24 RX ADMIN — PANTOPRAZOLE SODIUM 40 MG: 40 INJECTION, POWDER, FOR SOLUTION INTRAVENOUS at 08:11

## 2024-07-24 RX ADMIN — POTASSIUM CHLORIDE 40 MEQ: 29.8 INJECTION, SOLUTION INTRAVENOUS at 05:34

## 2024-07-24 RX ADMIN — HEPARIN SODIUM 2000 UNITS: 1000 INJECTION INTRAVENOUS; SUBCUTANEOUS at 07:08

## 2024-07-24 RX ADMIN — APIXABAN 5 MG: 5 TABLET, FILM COATED ORAL at 17:25

## 2024-07-24 RX ADMIN — CHLORHEXIDINE GLUCONATE 15 ML: 1.2 RINSE ORAL at 08:11

## 2024-07-24 RX ADMIN — CHLORHEXIDINE GLUCONATE 15 ML: 1.2 RINSE ORAL at 21:03

## 2024-07-24 RX ADMIN — DOXYCYCLINE 100 MG: 100 CAPSULE ORAL at 21:03

## 2024-07-24 NOTE — CASE MANAGEMENT
Case Management Discharge Planning Note    Patient name Bhavna Al  Location ICU 02/ICU 02 MRN [id number]  : 1950 Date 2024       Current Admission Date: 2024  Current Admission Diagnosis:Sepsis ([facility])   Patient Active Problem List    Diagnosis Date Noted Date Diagnosed    Sepsis ([facility]) 2024     Acute kidney injury superimposed on chronic kidney disease  ([facility]) 2024     Chronic pruritic rash in adult 2024     Secondary adrenal insufficiency ([facility]) 2024     Pressure ulcer of left buttock, stage 3 ([facility]) 2024     Acute osteomyelitis of thoracic spine ([facility]) 2024     MRSA bacteremia 2024     Hypotension 2024     Dysphagia 2024     Deep tissue injury 2024     Hypernatremia 2024     Localized swelling on left hand 2023     Stage 3 chronic kidney disease ([facility]) 2023     Febrile illness 2023     Dermatitis associated with incontinence 2023     Right shoulder pain 12/15/2022     Abnormal urinalysis 2022     Ambulatory dysfunction 2022     Hyperuricemia 2022     Acute metabolic encephalopathy 2022     Acute on chronic diastolic heart failure, LVEF 65%, LVIDd 3.3 cm, AHA Stage C 2022     Acute bronchitis 2022     Assistance needed with transportation 09/15/2022     Abnormal CT of the chest 2022     Gram-positive cocci bacteremia 2022     Psoriasis 2022     Elevated troponin 2022     COVID-19 2022     Shock ([facility]) 01/10/2022     Persistent atrial fibrillation 01/10/2022     Hyperparathyroidism ([facility]) 2021     Murmur, cardiac 2021     BPPV (benign paroxysmal positional vertigo) 2018     Seasonal allergic rhinitis due to pollen 2018     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     Osteoporosis 2016     Leukopenia 2016     Thrombocytopenia ([facility]) 2016     Rheumatoid arthritis ([facility]) 2016     GERD  (gastroesophageal reflux disease) 08/23/2016     Sarcoid 08/23/2016     Morbid obesity (HCC) 07/12/2016     Vitamin D deficiency 07/16/2015     Essential hypertension 12/04/2014     Lumbar radiculopathy 12/04/2014       LOS (days): 3  Geometric Mean LOS (GMLOS) (days): 5.1  Days to GMLOS:1.9     OBJECTIVE:  Risk of Unplanned Readmission Score: 36.78       Current admission status: Inpatient   Preferred Pharmacy:   [id number] - [place], [place] - [address]  [address]  [place] [address]  Phone: [phone] Fax: [phone]    [facility] - [address] [address]  Phone: [phone] Fax: [phone]    Primary Care Provider: Nya Taveras DO    Primary Insurance: MEDICARE  Secondary Insurance: [facility]    DISCHARGE DETAILS:     Other Referral/Resources/Interventions Provided:  Referral Comments: Reserved [facility] for [facility]     Additional Comments: Patient to be discharged back to [facility] once stable for discharge. Reservation in [place] for the facility and updated clinicals uploaded. CM will provide daily updates to the facility until discharge and will continue following patient for disposition.

## 2024-07-24 NOTE — PLAN OF CARE
Problem: CARDIOVASCULAR - ADULT  Goal: Maintains optimal cardiac output and hemodynamic stability  Description: INTERVENTIONS:  - Monitor I/O, vital signs and rhythm  - Monitor for S/S and trends of decreased cardiac output  - Administer and titrate ordered vasoactive medications to optimize hemodynamic stability  - Assess quality of pulses, skin color and temperature  - Assess for signs of decreased coronary artery perfusion  - Instruct patient to report change in severity of symptoms  Outcome: Progressing  Goal: Absence of cardiac dysrhythmias or at baseline rhythm  Description: INTERVENTIONS:  - Continuous cardiac monitoring, vital signs, obtain 12 lead EKG if ordered  - Administer antiarrhythmic and heart rate control medications as ordered  - Monitor electrolytes and administer replacement therapy as ordered  Outcome: Progressing     Problem: METABOLIC, FLUID AND ELECTROLYTES - ADULT  Goal: Electrolytes maintained within normal limits  Description: INTERVENTIONS:  - Monitor labs and assess patient for signs and symptoms of electrolyte imbalances  - Administer electrolyte replacement as ordered  - Monitor response to electrolyte replacements, including repeat lab results as appropriate  - Instruct patient on fluid and nutrition as appropriate  Outcome: Progressing  Goal: Fluid balance maintained  Description: INTERVENTIONS:  - Monitor labs   - Monitor I/O and WT  - Instruct patient on fluid and nutrition as appropriate  - Assess for signs & symptoms of volume excess or deficit  Outcome: Progressing  Goal: Glucose maintained within target range  Description: INTERVENTIONS:  - Monitor Blood Glucose as ordered  - Assess for signs and symptoms of hyperglycemia and hypoglycemia  - Administer ordered medications to maintain glucose within target range  - Assess nutritional intake and initiate nutrition service referral as needed  Outcome: Progressing     Problem: HEMATOLOGIC - ADULT  Goal: Maintains hematologic  stability  Description: INTERVENTIONS  - Assess for signs and symptoms of bleeding or hemorrhage  - Monitor labs  - Administer supportive blood products/factors as ordered and appropriate  Outcome: Progressing

## 2024-07-24 NOTE — CONSULTS
"E-Consult  Bhavna Al 74 y.o. female MRN: [id number]  Unit/Bed#: ICU 02 Encounter: [id number]      Reason for Consult / Principal Problem:      Thrombocytopenia,  plts 89     Concern with schistocytes on smear with other fragments suggesting stressed marrow in the setting of osteomyelitis     HIT very unlikely, no evidence DIC    Consulting Provider: Dasha Coats MD PhD    Requesting Provider: Dr Cuevas         ASSESSMENT:   RECOMMENDATIONS:        Thrombocytopenia    Plts today 89; were 105 and on 7/22/2024 187; on admission 191.      Machine read Kit celll, ovalocytes, schisotcytes, helmet, target, tear drops which all are seen with a stressed marrow    Please have smear officially read by pathology     TTP    There is no clear evidence of TTP despite \"schistocytes\" but no LDH was done; no clear reason to get [id number]    DIC-    no evidence of DIC-no Fibrinogen or splits done, PTT prolonged as on heparin    HIT    Plasmic score 3; was on heparin, now on DOAC which is correct; can see delay but very unlikely HIT as timing is not correct nor is the fall of the plts, no reason to send PF4 levels      Thus, follow plts, may be related to osteomyelitis/sepsis, antibiotics,      Anemia      Hgb  7.6 was 8.8 on 22 July  had been 10.2 on 21 July and prior to that in 8-9 range    MCV 88 and this appears AOCD    TB is normal; no LDH was done, no haptoglobin to rule out hemolysis done    No reticulocyte count was done    Please get the above testing including haptoglobin, LDH and retic as well as smear    No clear evidence of HUS at least from normal TB but need other parameters    MCV normal at 88            HPI 7/21/2024        Bhavna Al is a 74 y.o. female who presents with abdominal pain, as well as decreased p.o. intake and low blood pressure from her short-term rehab.  The patient has a past medical history of MRSA bacteremia felt to be secondary to osteomyelitis.  She also has a history of chronic diastolic " heart failure, atrial fibrillation maintained on Eliquis, GERD, hypertension as well as rheumatoid arthritis on Plaquenil and chronic corticosteroid use.  At the time my evaluation the patient denies any cough or congestion, no fevers or chills.  She has been residing in a short-term nursing facility for IV antibiotics and deconditioning.  She reports decreased p.o. intake, and states the last time she can member eating was on Friday.  In the emergency room she was noted to be significantly hypotensive, she had been evaluated urgently by the ICU who recommended that the patient was stable for medical surgical evaluation on the hospital medical floor.  Patient denies any other medication changes, she receives most of them from her short-term rehab.  Patient denies any urinary frequency or dysuria.     Admission     Thoracic vertebral osteomyelitis.  Imaging of the spine in the setting of back pain revealed T9-10 discitis/vertebral osteomyelitis. Likely staphylococcal based on the previous blood culture results.  Sedimentation rate and CRP have been decreasing.  Pain has been improving.  -Discontinue vancomycin  -Begin doxycycline 100 mg p.o. every 12 hours  -Recheck sedimentation rate and CRP once patient more stabilized    Shock-was on pressors      MRSA bactermia-was on Vanco, skin breakdown complicated by osteomyelitis              Total time spent 60 minutes, >50% of the total time devoted to medical consultative verbal/EMR discussion between providers. Written report will be generated in the EMR.

## 2024-07-24 NOTE — PLAN OF CARE
Problem: Prexisting or High Potential for Compromised Skin Integrity  Goal: Skin integrity is maintained or improved  Description: INTERVENTIONS:  - Identify patients at risk for skin breakdown  - Assess and monitor skin integrity  - Assess and monitor nutrition and hydration status  - Monitor labs   - Assess for incontinence   - Turn and reposition patient  - Assist with mobility/ambulation  - Relieve pressure over bony prominences  - Avoid friction and shearing  - Provide appropriate hygiene as needed including keeping skin clean and dry  - Evaluate need for skin moisturizer/barrier cream  - Collaborate with interdisciplinary team   - Patient/family teaching  - Consider wound care consult   Outcome: Progressing     Problem: PAIN - ADULT  Goal: Verbalizes/displays adequate comfort level or baseline comfort level  Description: Interventions:  - Encourage patient to monitor pain and request assistance  - Assess pain using appropriate pain scale  - Administer analgesics based on type and severity of pain and evaluate response  - Implement non-pharmacological measures as appropriate and evaluate response  - Consider cultural and social influences on pain and pain management  - Notify physician/advanced practitioner if interventions unsuccessful or patient reports new pain  Outcome: Progressing     Problem: INFECTION - ADULT  Goal: Absence or prevention of progression during hospitalization  Description: INTERVENTIONS:  - Assess and monitor for signs and symptoms of infection  - Monitor lab/diagnostic results  - Monitor all insertion sites, i.e. indwelling lines, tubes, and drains  - Monitor endotracheal if appropriate and nasal secretions for changes in amount and color  - Hines appropriate cooling/warming therapies per order  - Administer medications as ordered  - Instruct and encourage patient and family to use good hand hygiene technique  - Identify and instruct in appropriate isolation precautions for  identified infection/condition  Outcome: Progressing     Problem: SAFETY ADULT  Goal: Patient will remain free of falls  Description: INTERVENTIONS:  - Educate patient/family on patient safety including physical limitations  - Instruct patient to call for assistance with activity   - Consult OT/PT to assist with strengthening/mobility   - Keep Call bell within reach  - Keep bed low and locked with side rails adjusted as appropriate  - Keep care items and personal belongings within reach  - Initiate and maintain comfort rounds  - Make Fall Risk Sign visible to staff  - Offer Toileting every 2 Hours, in advance of need  - Initiate/Maintain bed alarm  - Apply yellow socks and bracelet for high fall risk patients  - Consider moving patient to room near nurses station  Outcome: Progressing  Goal: Maintain or return to baseline ADL function  Description: INTERVENTIONS:  -  Assess patient's ability to carry out ADLs; assess patient's baseline for ADL function and identify physical deficits which impact ability to perform ADLs (bathing, care of mouth/teeth, toileting, grooming, dressing, etc.)  - Assess/evaluate cause of self-care deficits   - Assess range of motion  - Assess patient's mobility; develop plan if impaired  - Assess patient's need for assistive devices and provide as appropriate  - Encourage maximum independence but intervene and supervise when necessary  - Involve family in performance of ADLs  - Assess for home care needs following discharge   - Consider OT consult to assist with ADL evaluation and planning for discharge  - Provide patient education as appropriate  Outcome: Progressing  Goal: Maintains/Returns to pre admission functional level  Description: INTERVENTIONS:  - Perform AM-PAC 6 Click Basic Mobility/ Daily Activity assessment daily.  - Set and communicate daily mobility goal to care team and patient/family/caregiver.   - Collaborate with rehabilitation services on mobility goals if consulted  -  Out of bed for toileting  - Record patient progress and toleration of activity level   Outcome: Progressing     Problem: DISCHARGE PLANNING  Goal: Discharge to home or other facility with appropriate resources  Description: INTERVENTIONS:  - Identify barriers to discharge w/patient and caregiver  - Arrange for needed discharge resources and transportation as appropriate  - Identify discharge learning needs (meds, wound care, etc.)  - Arrange for interpretive services to assist at discharge as needed  - Refer to Case Management Department for coordinating discharge planning if the patient needs post-hospital services based on physician/advanced practitioner order or complex needs related to functional status, cognitive ability, or social support system  Outcome: Progressing     Problem: Knowledge Deficit  Goal: Patient/family/caregiver demonstrates understanding of disease process, treatment plan, medications, and discharge instructions  Description: Complete learning assessment and assess knowledge base.  Interventions:  - Provide teaching at level of understanding  - Provide teaching via preferred learning methods  Outcome: Progressing     Problem: CARDIOVASCULAR - ADULT  Goal: Maintains optimal cardiac output and hemodynamic stability  Description: INTERVENTIONS:  - Monitor I/O, vital signs and rhythm  - Monitor for S/S and trends of decreased cardiac output  - Administer and titrate ordered vasoactive medications to optimize hemodynamic stability  - Assess quality of pulses, skin color and temperature  - Assess for signs of decreased coronary artery perfusion  - Instruct patient to report change in severity of symptoms  Outcome: Progressing  Goal: Absence of cardiac dysrhythmias or at baseline rhythm  Description: INTERVENTIONS:  - Continuous cardiac monitoring, vital signs, obtain 12 lead EKG if ordered  - Administer antiarrhythmic and heart rate control medications as ordered  - Monitor electrolytes and  administer replacement therapy as ordered  Outcome: Progressing     Problem: RESPIRATORY - ADULT  Goal: Achieves optimal ventilation and oxygenation  Description: INTERVENTIONS:  - Assess for changes in respiratory status  - Assess for changes in mentation and behavior  - Position to facilitate oxygenation and minimize respiratory effort  - Oxygen administered by appropriate delivery if ordered  - Initiate smoking cessation education as indicated  - Encourage broncho-pulmonary hygiene including cough, deep breathe, Incentive Spirometry  - Assess the need for suctioning and aspirate as needed  - Assess and instruct to report SOB or any respiratory difficulty  - Respiratory Therapy support as indicated  Outcome: Progressing     Problem: METABOLIC, FLUID AND ELECTROLYTES - ADULT  Goal: Electrolytes maintained within normal limits  Description: INTERVENTIONS:  - Monitor labs and assess patient for signs and symptoms of electrolyte imbalances  - Administer electrolyte replacement as ordered  - Monitor response to electrolyte replacements, including repeat lab results as appropriate  - Instruct patient on fluid and nutrition as appropriate  Outcome: Progressing  Goal: Fluid balance maintained  Description: INTERVENTIONS:  - Monitor labs   - Monitor I/O and WT  - Instruct patient on fluid and nutrition as appropriate  - Assess for signs & symptoms of volume excess or deficit  Outcome: Progressing  Goal: Glucose maintained within target range  Description: INTERVENTIONS:  - Monitor Blood Glucose as ordered  - Assess for signs and symptoms of hyperglycemia and hypoglycemia  - Administer ordered medications to maintain glucose within target range  - Assess nutritional intake and initiate nutrition service referral as needed  Outcome: Progressing     Problem: HEMATOLOGIC - ADULT  Goal: Maintains hematologic stability  Description: INTERVENTIONS  - Assess for signs and symptoms of bleeding or hemorrhage  - Monitor labs  -  Administer supportive blood products/factors as ordered and appropriate  Outcome: Progressing     Problem: Nutrition/Hydration-ADULT  Goal: Nutrient/Hydration intake appropriate for improving, restoring or maintaining nutritional needs  Description: Monitor and assess patient's nutrition/hydration status for malnutrition. Collaborate with interdisciplinary team and initiate plan and interventions as ordered.  Monitor patient's weight and dietary intake as ordered or per policy. Utilize nutrition screening tool and intervene as necessary. Determine patient's food preferences and provide high-protein, high-caloric foods as appropriate.     INTERVENTIONS:  - Monitor oral intake, urinary output, labs, and treatment plans  - Assess nutrition and hydration status and recommend course of action  - Evaluate amount of meals eaten  - Assist patient with eating if necessary   - Allow adequate time for meals  - Recommend/ encourage appropriate diets, oral nutritional supplements, and vitamin/mineral supplements  - Order, calculate, and assess calorie counts as needed  - Recommend, monitor, and adjust tube feedings and TPN/PPN based on assessed needs  - Assess need for intravenous fluids  - Provide specific nutrition/hydration education as appropriate  - Include patient/family/caregiver in decisions related to nutrition  Outcome: Progressing

## 2024-07-24 NOTE — PROGRESS NOTES
[facility]  Progress Note  Name: Bhavna Al I  MRN: [id number]  Unit/Bed#: ICU 02 I Date of Admission: 7/21/2024   Date of Service: 7/24/2024 I Hospital Day: 3    Assessment & Plan   Shock (HCC)  Assessment & Plan  Recent Labs     07/21/24  0933 07/22/24  0426   PROCALCITONI 40.79* 36.36*     Undifferentiated  Vanco level is above goal so she will not need vancomycin at this moment.  Infectious disease on board, appreciate recommendations  Volume resuscitation completed  Echo shows EF is >75%. LV cavity size is normal. Systolic function is hyperdynamic. Severe annular mitral valve calcification. There is trace regurgitation.  Lactic acid cleared  Blood cultures -ve 48 hours  Urine cultures <1000 cfu  ESR normal and CRP is elevated    Plan:  Continue cefepime day 4. Discontinue today.  Switch vancomycin to doxycycline  Stress dose steroid discontinued, today 6 am last dose  Cosyntropin test tomorrow a.m. to check for adrenal insufficiency.     * Sepsis (HCC)  Assessment & Plan  WBC count normal  Procal elevated but downtrending  Bicarb uptrended to normal after bicarb with D5  drip    Plan:  Continue cefepime   Vanco level above goal  Monitor white count  Continuous cardiopulmonary monitoring    Hypotension  Assessment & Plan  Possibly secondary to sepsis  Weaned off of pressors.  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  Repeat echo does not show any valvular vegetation.  Blood cultures negative at 48 hours.    Plan  Switch vancomycin to doxycycline p.o until inflammatory markers stabilize/normalize.  ID following    Elevated troponin  Assessment & Plan  Lab Results   Component Value Date    HSTNI0 1,374 (H) 07/21/2024    HSTNI2 1,239 (H) 07/21/2024    HSTNID2 -135 07/21/2024    HSTNI4 364 (H) 06/29/2024    HSTNID4 84 (H) 06/29/2024     Cardiology consulted  Continuous cardiopulmonary monitoring      Chronic pruritic rash in adult  Assessment & Plan  Diagnosis psoriasis proven by biopsy  Continue to use Lac-Hydrin lotion as needed    Persistent atrial fibrillation  Assessment & Plan  Home med is lopressor 25 mg BID, currently on hold  Cardiology consulted  Continuous cardiopulmonary monitor    Deep tissue injury  Assessment & Plan  Float heels  Monitor skin  Continue Lac-Hydrin as needed  Consider wound consult if needed    Ambulatory dysfunction  Assessment & Plan  Patient is bed bound, wheelchair bound and she is vamshi lift at facility at baseline.  No further PT needs.    Acute on chronic diastolic heart failure, LVEF 65%, LVIDd 3.3 cm, AHA Stage C  Assessment & Plan  Wt Readings from Last 3 Encounters:   07/23/24 95.7 kg (210 lb 15.7 oz)   07/11/24 108 kg (238 lb)   07/05/24 108 kg (238 lb 1.6 oz)     Pulmonary edema likely due to Afib RVR   At [place] she is on Lasix 20 mg daily.   Yesterday given 1 dose of IV Lasix after which she diuresed only 300 ml.    Plan:   1 dose of Bumex today         Morbid obesity (HCC)  Assessment & Plan  Recommend decrease caloric intake and increase activity    Rheumatoid arthritis (HCC)  Assessment & Plan  Continue Tylenol 650 mg p.o. every 6 hours  Continue Plaquenil    Thrombocytopenia (HCC)  Assessment & Plan  Recent Labs     07/22/24  6752  07/23/24  0446 07/24/24  0436    105* 89*     Patient is currently on heparin drip and is net 7.7 litres positive.  3 points on 4T score. Unlikely HIT at this time.    Hypoxia-resolved as of 7/24/2024  Assessment & Plan  Requiring 2 L of oxygen and intermittently sounding wet when given IV fluids.  Chest xray showing pulmonary vascular congestion. Mild persistent right upper lobe opacities may be due to edema or pneumonia. No new abnormality.   Now on room air    Hypokalemia-resolved as of 7/22/2024  Assessment & Plan  Recent Labs     07/22/24  1605 07/23/24  0434 07/24/24  0436   K 4.1 3.6 3.1*   MG 1.7* 2.2 2.8*     Replete as needed  Monitor electrolytes           Disposition: Stepdown Level 2    ICU Core Measures     A: Assess, Prevent, and Manage Pain Has pain been assessed? Yes  Need for changes to pain regimen? No   B: Both SAT/SAT  N/A   C: Choice of Sedation RASS Goal: N/A patient not on sedation  Need for changes to sedation or analgesia regimen? NA   D: Delirium CAM-ICU: Negative   E: Early Mobility  Plan for early mobility? Yes   F: Family Engagement Plan for family engagement today? Yes       Antibiotic Review: Awaiting culture results.     Review of Invasive Devices:    Corey Plan: Voiding trial after improvement in ambulation   Central access plan: No peripheral access able to be obtained.  Plan to remove today      Prophylaxis:  VTE VTE covered by:  heparin (porcine), Intravenous, 9.1 Units/kg/hr at 07/24/24 0709  heparin (porcine), Intravenous, 2,000 Units at 07/24/24 0708  heparin (porcine), Intravenous       Stress Ulcer  covered bypantoprazole (PROTONIX) 40 mg tablet [[id number]] (Long-Term Med), pantoprazole (PROTONIX) injection 40 mg [[id number]]         Significant 24hr Events     24hr events: Tele showed 13 beats of ventricular tachycardia. No other events overnight.     Subjective   Review of Systems: Review of Systems   Unable to perform ROS: Mental status change        Objective                             Vitals I/O      Most Recent Min/Max in 24hrs   Temp 97.5 °F (36.4 °C) Temp  Min: 97 °F (36.1 °C)  Max: 98.5 °F (36.9 °C)   Pulse 76 Pulse  Min: 72  Max: 97   Resp 20 Resp  Min: 17  Max: 41   /60 BP  Min: 109/57  Max: 143/60   O2 Sat 98 % SpO2  Min: 97 %  Max: 100 %      Intake/Output Summary (Last 24 hours) at 2024 0822  Last data filed at 2024 0600  Gross per 24 hour   Intake 1928.64 ml   Output 680 ml   Net 1248.64 ml       Diet Dysphagia/Modified Consistency; Dysphagia 3-Dental Soft; Thin Liquid    Invasive Monitoring           Physical Exam   Physical Exam  Vitals and nursing note reviewed.   Eyes:      Extraocular Movements: Extraocular movements intact.      Conjunctiva/sclera: Conjunctivae normal.      Pupils: Pupils are equal, round, and reactive to light.   Skin:     General: Skin is warm, dry and not mottled extremities.      Capillary Refill: Capillary refill takes less than 2 seconds.      Coloration: Skin is not jaundiced or pale.      Findings: Erythema and rash present. No lesion or wound.      Comments: Diffuse erythema -- confirmed psoriasis on biopsy  Hyperalgesic to touch   HENT:      Head: Normocephalic and atraumatic.      Right Ear: Hearing normal.      Left Ear: Hearing normal.      Mouth/Throat:      Mouth: Mucous membranes are moist.   Neck:      Vascular: No JVD.   no midline tenderness Cardiovascular:      Rate and Rhythm: Normal rate and regular rhythm.      Pulses: No decreased pulses.      Heart sounds: No murmur heard.     No friction rub. No gallop.   Musculoskeletal:         General: No swelling, tenderness, deformity or signs of injury. Normal range of motion.      Right lower le+ Edema present.      Left lower le+ Edema present.   Abdominal: General: Abdomen is protuberant. Bowel sounds are decreased. There is no distension.      Palpations: Abdomen is soft.      Tenderness: There is no abdominal tenderness.   Constitutional:        General: She is not in acute distress.     Appearance: She is well-developed and well-nourished. She is ill-appearing. She is not toxic-appearing.      Interventions: She is not sedated, not intubated and not restrained.  Pulmonary:      Effort: Pulmonary effort is normal. She is not intubated.      Breath sounds: No wheezing, rhonchi or rales.   Psychiatric:         Mood and Affect:  mood and affect abnormal.  Neurological:      Mental Status: She is lethargic.      GCS: GCS eye subscore is 3. GCS verbal subscore is 4. GCS motor subscore is 6.      Cranial Nerves: No facial asymmetry.      Sensory: Sensation is intact.   Genitourinary/Anorectal:  Corey present.          Diagnostic Studies      EKG: Atrial fibrillation HR 78  Imaging: No new imaging obtained      Medications:  Scheduled PRN   ammonium lactate, , BID  cefepime, 1,000 mg, Q12H  chlorhexidine, 15 mL, Q12H FAMILIA  pantoprazole, 40 mg, Q24H FAMILIA  potassium chloride, 40 mEq, Once   Followed by  potassium chloride, 40 mEq, Once      acetaminophen, 650 mg, Q4H PRN  albuterol, 2 puff, Q6H PRN  heparin (porcine), 2,000 Units, Q6H PRN  heparin (porcine), 4,000 Units, Q6H PRN  levalbuterol, 1.25 mg, Q8H PRN  ondansetron, 4 mg, Q4H PRN  trimethobenzamide, 200 mg, Q6H PRN  vancomycin, 750 mg, Daily PRN       Continuous    dextrose, 75 mL/hr, Last Rate: 75 mL/hr (07/23/24 2245)  heparin (porcine), 3-20 Units/kg/hr (Order-Specific), Last Rate: 9.1 Units/kg/hr (07/24/24 0709)         Labs:    CBC    Recent Labs     07/23/24  0446 07/24/24  0436   WBC 6.05 4.41   HGB 7.6* 7.6*   HCT 24.2* 24.4*   * 89*     BMP    Recent Labs     07/23/24  0434 07/24/24  0436   SODIUM 147 144   K 3.6 3.1*   * 110*   CO2 21 24   AGAP 13 10   BUN 31* 35*   CREATININE 2.71* 2.65*   CALCIUM 8.0* 8.6       Coags    Recent Labs     07/23/24  2325 07/24/24  0537   PTT 92* 50*        Additional Electrolytes  Recent Labs     07/23/24  0434 07/24/24  0436   MG 2.2 2.8*   PHOS 3.4 2.4           Blood Gas    No recent results  Recent Labs     07/24/24  0436   PHVEN 7.355   JPZ1CDS 42.4   PO2VEN 31.9*   [id number] 23.1*   BEVEN -2.2   [id number] 54.9*    LFTs  No recent results    Infectious  No recent results  Glucose  Recent Labs     07/22/24  1605 07/23/24  0434 07/24/24  0436   GLUC 112 135 116               Junaid Mendoza MD   Rakel King

## 2024-07-24 NOTE — ASSESSMENT & PLAN NOTE
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  Repeat echo does not show any valvular vegetation.  Blood cultures negative at 48 hours.    Plan  Switch vancomycin to doxycycline p.o until inflammatory markers stabilize/normalize.  ID following

## 2024-07-24 NOTE — PROGRESS NOTES
Progress Note - Infectious Disease   Bhavna Al 74 y.o. female MRN: [id number]  Unit/Bed#: ICU 02 Encounter: [id number]      Impression/Plan:  1.  Shock.  No infectious etiology found despite very extensive evaluation clinically, microbiologically, and radiographically.  Cultures were all negative.  Patient is weaned off vasopressor support.. Consideration for the possibly adrenal insufficiency on this patient with a chronic low-dose steroids being used.  Procalcitonin level was quite high but in the setting of acute kidney injury.  CT chest abdomen pelvis without a definitive source.   She has now weaned off vasopressor support rapidly with stress dose steroids.  -Discontinue cefepime and vancomycin  -Otherwise antibiotics as below  -Follow-up final blood cultures  -Stress dose steroids  -Recheck CBC with differential and CMP to make sure no developing toxicities  -Supportive care     2.  MRSA bacteremia.  Undefined primary source but with a chronic rash with some areas of skin breakdown which could be playing a role.  Complicated by thoracic vertebral osteomyelitis as below.  Patient previously cleared her bacteremia and a transthoracic echocardiogram that was without valvular vegetations.  CT of the chest abdomen pelvis without any new complications.  Repeat transthoracic echocardiogram without valvular vegetation appreciated.  Patient has now completed more than 6 weeks of intravenous antibiotics and the follow-up blood cultures are negative.  -Discontinue vancomycin as above  -Follow-up repeat blood cultures  -Additional workup as needed     3.  Thoracic vertebral osteomyelitis.  Imaging of the spine in the setting of back pain revealed T9-10 discitis/vertebral osteomyelitis. Likely staphylococcal based on the previous blood culture results.  Sedimentation rate and CRP have been decreasing.  Pain has been improving.  -Discontinue vancomycin  -Begin doxycycline 100 mg p.o. every 12 hours  -Recheck sedimentation  rate and CRP once patient more stabilized  -Plan to continue the oral antibiotics until the sedimentation rate normalizes or stabilizes.     4.  Acute kidney injury.  Most likely prerenal issue.  Consideration for the possibility of ATN.  Modest improvement.  -Dose adjusted antibiotics  -Recheck BMP  -Volume management     5.  Deep tissue injury.  Not overtly infected.  -Offloading and local wound care     6.  Rheumatoid arthritis.  On Plaquenil and prednisone.     7.  Psoriasis.  On topical therapy but needs dermatology follow-up    Discussed with the critical care service the plan to discontinue the intravenous antibiotics and they agree with the plan.    Antibiotics:  Vancomycin 24  Cefepime 4  Antibiotics 47    Subjective:  Patient has no fever, chills, sweats; no nausea, vomiting, diarrhea; no cough, shortness of breath; no pain. No new symptoms.  She is a bit sleepy this morning but denies any new complaints.  She is not requiring any vasopressor support or oxygen supplementation.    Objective:  Vitals:  Temp:  [97 °F (36.1 °C)-98.5 °F (36.9 °C)] 97.5 °F (36.4 °C)  HR:  [72-97] 76  Resp:  [17-41] 20  BP: (109-143)/(53-76) 129/60  SpO2:  [97 %-100 %] 98 %  Temp (24hrs), Av.8 °F (36.6 °C), Min:97 °F (36.1 °C), Max:98.5 °F (36.9 °C)  Current: Temperature: 97.5 °F (36.4 °C)    Physical Exam:   General Appearance:  Somnolent, interactive, nontoxic, no acute distress.   Throat: Oropharynx moist without lesions.    Lungs:   Clear to auscultation bilaterally; no wheezes, rhonchi or rales; respirations unlabored   Heart:  RRR; no murmur, rub or gallop   Abdomen:   Soft, non-tender, non-distended, positive bowel sounds.     Extremities: No clubbing, cyanosis or edema   Skin: No new rashes or lesions. No draining wounds noted.       Labs, Imaging, & Other studies:   All pertinent labs and imaging studies were personally reviewed  Results from last 7 days   Lab Units 24  0436 24  0446 24  0426   WBC  Thousand/uL 4.41 6.05 9.32   HEMOGLOBIN g/dL 7.6* 7.6* 8.8*   PLATELETS Thousands/uL 89* 105* 187     Results from last 7 days   Lab Units 07/24/24  0436 07/23/24  0434 07/22/24  1605 07/21/24  1818 07/21/24  0933   SODIUM mmol/L 144 147 149*   < > 145   POTASSIUM mmol/L 3.1* 3.6 4.1   < > 2.7*   CHLORIDE mmol/L 110* 113* 114*   < > 109*   CO2 mmol/L 24 21 20*   < > 20*   BUN mg/dL 35* 31* 27*   < > 24   CREATININE mg/dL 2.65* 2.71* 2.73*   < > 2.58*   EGFR ml/min/1.73sq m 16 16 16   < > 17   CALCIUM mg/dL 8.6 8.0* 8.3*   < > 8.7   AST U/L  --   --   --   --  27   ALT U/L  --   --   --   --  23   ALK PHOS U/L  --   --   --   --  46    < > = values in this interval not displayed.     Results from last 7 days   Lab Units 07/22/24  1025 07/21/24  0933   BLOOD CULTURE   --  No Growth at 48 hrs.  No Growth at 48 hrs.   URINE CULTURE  No Growth <1000 cfu/mL  --      Results from last 7 days   Lab Units 07/22/24  0426 07/21/24  0933   PROCALCITONIN ng/ml 36.36* 40.79*     Results from last 7 days   Lab Units 07/24/24  0436   CRP mg/L 130.1*       Chest x-ray.  Low lung volumes.  Pulmonary vascular congestion.    Images personally reviewed by me in PACS and interpreted by me

## 2024-07-24 NOTE — SPEECH THERAPY NOTE
Speech Language/Pathology  Speech/Language Pathology  Assessment    Patient Name: Bhavna Al  Today's Date: 2024     Problem List  Principal Problem:    Sepsis (HCC)  Active Problems:    Thrombocytopenia (HCC)    Rheumatoid arthritis (HCC)    Morbid obesity (HCC)    Shock (HCC)    Elevated troponin    Acute on chronic diastolic heart failure, LVEF 65%, LVIDd 3.3 cm, AHA Stage C    Ambulatory dysfunction    Deep tissue injury    MRSA bacteremia    Hypotension    Persistent atrial fibrillation    Acute kidney injury superimposed on chronic kidney disease  (HCC)    Chronic pruritic rash in adult    Past Medical History  Past Medical History:   Diagnosis Date    Abnormal thyroid function test     last assessed: 2015     Arthritis     Caries     last assessed: 2016     Continuous opioid dependence (HCC) 2021    Edema of right lower extremity     last assessed: 2015     GERD (gastroesophageal reflux disease)     Hypertension     Medicare annual wellness visit, subsequent 2021    Positive blood culture 3/11/2022    Sarcoid      Past Surgical History  Past Surgical History:   Procedure Laterality Date     SECTION      IR NON-TUNNELED CENTRAL LINE PLACEMENT  2024    IR PICC PLACEMENT SINGLE LUMEN  2024    IR PICC PLACEMENT SINGLE LUMEN  2024    MULTIPLE TOOTH EXTRACTIONS N/A 2016    Procedure: Surgical extraction of teeth 2, 18, 19, 30, 31; incision and drainage of left subperiosteal abscess ;  Surgeon: Clara Cevallos DMD;  Location: [facility];  Service:         Bedside Swallow Evaluation:    Summary:  Questionable EGD . I am unable to locate the report.   Previous VBS 22:No penetration or aspiration on today's study. Brief screening of the esophagus revealed slow motility and retention.   Question if RUL findings on cxr may be due to refluxed material. Nurse reported delayed coughing after meal completion. Pt reported heartburn/reflux type  s/s. Po coming back up or not going down. Pt presented w/ no wet vocal quality w/  solids or liquids. + cough w/ solids, (rice, which she chewed excessively, and a tsp of chicken noodle soup). Coughed after solids. Had a small amt of applesauce and stated it was easier going down. After a small amt she refused anymore. Agreeable to a few sips of water which she did not cough on. Niece at bedside. Stated this has been going on for a while and that pt gets full quickly. Mastication was wnl, bolus control and formation appeared adequate. Swallows were prompt. Oral/pharyngeal stages appeared wfl w/ limited sample. Suspect esophageal dysphagia.     Recommendations:  Diet:downgrade further to dysphagia 2 mechanical soft and thin.   Liquid:thin  Meds: break or crush unless small (d/w nurse)  Supervision: full  Positioning:Upright  Strategies: slow rate, small bites and sips. Alternate w/ liquids.   Pt to take PO/Meds only when fully alert and upright.   Oral care  Aspiration precautions  *Reflux precautions (pt reported she was sleeping flat prior to coming in. Educate to elevate HOB)  Therapy Prognosis: unknown at this time. Suspect some improvement as sepsis resolves.   Frequency: 2-5x/wk as able and indicated  GI consult    Consider consult w/:  Rehab  GI, ? EGD  Nutrition    Goal(s):  Dysphagia LTG  -Patient will demonstrate safe and effective oral intake (without overt s/s significant oral/pharyngeal dysphagia including s/s penetration or aspiration) for the highest appropriate diet level.     1.Pt will tolerate least restrictive diet w/out s/s aspiration or oral/pharyngeal difficulties.   2.Pt will will effectively manipulate/masticate and transfer purees/solids w/out s/s dysphagia/aspiration.   3.Pt will tolerate thin liquids w/out s/s aspiration.   -If indicated, patient will comply with a Video/Modified Barium Swallow study for more complete assessment of swallowing anatomy/physiology/aspiration risk and to assess  efficacy of treatment techniques so as to best guide treatment plan   H&P/Admit info/ pertinent provider notes: (PMH noted above)  History of Present Illness:  Bhavna Al is a 74 y.o. female who presents with abdominal pain, as well as decreased p.o. intake and low blood pressure from her short-term rehab.  The patient has a past medical history of MRSA bacteremia felt to be secondary to osteomyelitis.  She also has a history of chronic diastolic heart failure, atrial fibrillation maintained on Eliquis, GERD, hypertension as well as rheumatoid arthritis on Plaquenil and chronic corticosteroid use.  At the time my evaluation the patient denies any cough or congestion, no fevers or chills.  She has been residing in a short-term nursing facility for IV antibiotics and deconditioning.  She reports decreased p.o. intake, and states the last time she can member eating was on Friday.  In the emergency room she was noted to be significantly hypotensive, she had been evaluated urgently by the ICU who recommended that the patient was stable for medical surgical evaluation on the hospital medical floor.  Patient denies any other medication changes, she receives most of them from her short-term rehab.  Patient denies any urinary frequency or dysuria.    Special Studies:  7/23/24 CXR:  Low lung volumes. Pulmonary vascular congestion. Mild persistent right upper lobe opacities may be due to edema or pneumonia. No new abnormality.     Procalcitonin:    WBC:   4.41     Previous MBS: 1/18/22  Summary: 1/18/22  Images are on PACS for review.   Pt presents w/ mild oropharyngeal dysphagia venkata by mildly prolonged mastication and oral manipulation, delayed swallow initiation, and min-mild pharyngeal residue. Pt w/ slow transfer of material. All material spills to the valleculae prior to delayed hyo-laryngeal excursion. Min-mild vallecular retention noted after the swallow w/ subsequent pooling in the pyriforms. No penetration or  aspiration on today's study. Brief screening of the esophagus revealed slow motility and retention.   Recommendations:  Diet: Regular   Liquids: Thin   Meds: As tolerated  Strategies: Mult. Swallows   Upright position  F/u ST tx: Yes, brief to review VBS findings and recommendations   Therapy Prognosis: Good  Prognosis considerations: Age, current medical, past medical, cognitive status   Aspiration Precautions   Reflux Precautions  Consider consult with: GI   Results reviewed with: pt, nursing  Aspiration precautions posted.  If a dedicated assessment of the esophagus is desired, consider esophagram/barium swallow or EGD.    Patient's goal: wanted to lie back and sleep    Did the pt report pain? no  If yes, was nursing notified/was it addressed? N/a    Reason for consult:  R/o aspiration  Determine safest and least restrictive diet  h/o dysphagia   C/o solid food dysphagia, chews excessively to get it down.     Precautions:  Contact  Fall    Food Allergies:  Shellfish derived products   Current Diet: Ndd3 dental w/ thin last seen at [facility] 6/9/24   Premorbid diet:  Regular. Prosource 1x/day    O2 requirement:  RA   Social/Prior living  [place]   Voice/Speech:  Minimal speech was clear. Speaks english and [place]   Follows commands:  basic   Cognitive status:  Alert but fatigued     Oral [facility] exam:  Dentition:partial natural  Lips (VII):+  Tongue (XII):+  Secretion management:+  Volitional cough: strong    Esophageal stage:  H/o GERD  Early satiety  Reported retrograde flow, heartburn    Results d/w:  Pt, nursing, family

## 2024-07-24 NOTE — CONSULTS
Vancomycin therapy has been discontinued. Pharmacy will sign off. Thank you for this consult. Please do not hesitate to call us with questions or re-consult us if the need arises.    Yesi Verdugo, PharmD, [facility]  Critical Care and Internal Medicine Clinical Pharmacist  [phone] or via Secure Chat

## 2024-07-24 NOTE — ASSESSMENT & PLAN NOTE
Recent Labs     07/22/24  0426 07/23/24  0446 07/24/24  0436    105* 89*     Patient is currently on heparin drip and is net 7.7 litres positive.  3 points on 4T score. Unlikely HIT at this time.

## 2024-07-25 PROBLEM — A41.9 SEPSIS (HCC): Status: RESOLVED | Noted: 2024-07-21 | Resolved: 2024-07-25

## 2024-07-25 LAB
ALBUMIN SERPL BCG-MCNC: 2.9 G/DL (ref 3.5–5)
ALP SERPL-CCNC: 46 U/L (ref 34–104)
ALT SERPL W P-5'-P-CCNC: 37 U/L (ref 7–52)
ANION GAP SERPL CALCULATED.3IONS-SCNC: 8 MMOL/L (ref 4–13)
ANION GAP SERPL CALCULATED.3IONS-SCNC: 8 MMOL/L (ref 4–13)
AST SERPL W P-5'-P-CCNC: 41 U/L (ref 13–39)
BILIRUB SERPL-MCNC: 0.54 MG/DL (ref 0.2–1)
BUN SERPL-MCNC: 34 MG/DL (ref 5–25)
BUN SERPL-MCNC: 36 MG/DL (ref 5–25)
CALCIUM ALBUM COR SERPL-MCNC: 9.9 MG/DL (ref 8.3–10.1)
CALCIUM SERPL-MCNC: 9 MG/DL (ref 8.4–10.2)
CALCIUM SERPL-MCNC: 9.3 MG/DL (ref 8.4–10.2)
CHLORIDE SERPL-SCNC: 112 MMOL/L (ref 96–108)
CHLORIDE SERPL-SCNC: 112 MMOL/L (ref 96–108)
CO2 SERPL-SCNC: 25 MMOL/L (ref 21–32)
CO2 SERPL-SCNC: 25 MMOL/L (ref 21–32)
CORTIS AM PEAK SERPL-MCNC: 36.6 UG/DL (ref 6.7–22.6)
CORTIS SERPL-MCNC: 35 UG/DL
CORTIS SERPL-MCNC: 39 UG/DL
CREAT SERPL-MCNC: 2.34 MG/DL (ref 0.6–1.3)
CREAT SERPL-MCNC: 2.52 MG/DL (ref 0.6–1.3)
DEPRECATED AT III PPP: 32 % OF NORMAL (ref 92–136)
ERYTHROCYTE [DISTWIDTH] IN BLOOD BY AUTOMATED COUNT: 16.7 % (ref 11.6–15.1)
GFR SERPL CREATININE-BSD FRML MDRD: 17 ML/MIN/1.73SQ M
GFR SERPL CREATININE-BSD FRML MDRD: 19 ML/MIN/1.73SQ M
GLUCOSE SERPL-MCNC: 100 MG/DL (ref 65–140)
GLUCOSE SERPL-MCNC: 68 MG/DL (ref 65–140)
GLUCOSE SERPL-MCNC: 70 MG/DL (ref 65–140)
GLUCOSE SERPL-MCNC: 79 MG/DL (ref 65–140)
GLUCOSE SERPL-MCNC: 79 MG/DL (ref 65–140)
GLUCOSE SERPL-MCNC: 80 MG/DL (ref 65–140)
GLUCOSE SERPL-MCNC: 85 MG/DL (ref 65–140)
GLUCOSE SERPL-MCNC: 88 MG/DL (ref 65–140)
GLUCOSE SERPL-MCNC: 88 MG/DL (ref 65–140)
GLUCOSE SERPL-MCNC: 92 MG/DL (ref 65–140)
HAPTOGLOB SERPL-MCNC: 164 MG/DL (ref 42–346)
HAPTOGLOB SERPL-MCNC: 193 MG/DL (ref 42–346)
HCT VFR BLD AUTO: 25.1 % (ref 34.8–46.1)
HGB BLD-MCNC: 7.9 G/DL (ref 11.5–15.4)
MAGNESIUM SERPL-MCNC: 2.5 MG/DL (ref 1.9–2.7)
MCH RBC QN AUTO: 28.4 PG (ref 26.8–34.3)
MCHC RBC AUTO-ENTMCNC: 31.5 G/DL (ref 31.4–37.4)
MCV RBC AUTO: 90 FL (ref 82–98)
PHOSPHATE SERPL-MCNC: 1.8 MG/DL (ref 2.3–4.1)
PLATELET # BLD AUTO: 78 THOUSANDS/UL (ref 149–390)
POTASSIUM SERPL-SCNC: 3.2 MMOL/L (ref 3.5–5.3)
POTASSIUM SERPL-SCNC: 5 MMOL/L (ref 3.5–5.3)
PROT SERPL-MCNC: 5.3 G/DL (ref 6.4–8.4)
RBC # BLD AUTO: 2.78 MILLION/UL (ref 3.81–5.12)
SODIUM SERPL-SCNC: 145 MMOL/L (ref 135–147)
SODIUM SERPL-SCNC: 145 MMOL/L (ref 135–147)
TPA PPP QL CHRO: 60 % OF NORMAL (ref 77–138)
WBC # BLD AUTO: 2.84 THOUSAND/UL (ref 4.31–10.16)

## 2024-07-25 PROCEDURE — 82948 REAGENT STRIP/BLOOD GLUCOSE: CPT

## 2024-07-25 PROCEDURE — 85027 COMPLETE CBC AUTOMATED: CPT

## 2024-07-25 PROCEDURE — 80053 COMPREHEN METABOLIC PANEL: CPT

## 2024-07-25 PROCEDURE — 99232 SBSQ HOSP IP/OBS MODERATE 35: CPT | Performed by: INTERNAL MEDICINE

## 2024-07-25 PROCEDURE — 82533 TOTAL CORTISOL: CPT

## 2024-07-25 PROCEDURE — 82024 ASSAY OF ACTH: CPT

## 2024-07-25 PROCEDURE — NC001 PR NO CHARGE: Performed by: INTERNAL MEDICINE

## 2024-07-25 PROCEDURE — 84100 ASSAY OF PHOSPHORUS: CPT

## 2024-07-25 PROCEDURE — 80048 BASIC METABOLIC PNL TOTAL CA: CPT

## 2024-07-25 PROCEDURE — 99233 SBSQ HOSP IP/OBS HIGH 50: CPT | Performed by: INTERNAL MEDICINE

## 2024-07-25 PROCEDURE — 83735 ASSAY OF MAGNESIUM: CPT

## 2024-07-25 RX ORDER — POTASSIUM CHLORIDE 20MEQ/15ML
20 LIQUID (ML) ORAL ONCE
Status: COMPLETED | OUTPATIENT
Start: 2024-07-25 | End: 2024-07-25

## 2024-07-25 RX ORDER — BUMETANIDE 0.25 MG/ML
4 INJECTION INTRAMUSCULAR; INTRAVENOUS 2 TIMES DAILY
Status: COMPLETED | OUTPATIENT
Start: 2024-07-25 | End: 2024-07-25

## 2024-07-25 RX ORDER — FUROSEMIDE 20 MG/1
20 TABLET ORAL DAILY
Status: DISCONTINUED | OUTPATIENT
Start: 2024-07-25 | End: 2024-07-25

## 2024-07-25 RX ORDER — ALPRAZOLAM 0.25 MG/1
0.25 TABLET ORAL ONCE
Status: COMPLETED | OUTPATIENT
Start: 2024-07-25 | End: 2024-07-25

## 2024-07-25 RX ORDER — BUMETANIDE 0.25 MG/ML
4 INJECTION INTRAMUSCULAR; INTRAVENOUS ONCE
Status: DISCONTINUED | OUTPATIENT
Start: 2024-07-25 | End: 2024-07-25

## 2024-07-25 RX ORDER — PREDNISONE 2.5 MG/1
5 TABLET ORAL 2 TIMES DAILY WITH MEALS
Status: DISCONTINUED | OUTPATIENT
Start: 2024-07-25 | End: 2024-07-27

## 2024-07-25 RX ORDER — POTASSIUM CHLORIDE 29.8 MG/ML
40 INJECTION INTRAVENOUS ONCE
Status: COMPLETED | OUTPATIENT
Start: 2024-07-25 | End: 2024-07-25

## 2024-07-25 RX ADMIN — POTASSIUM CHLORIDE 40 MEQ: 29.8 INJECTION, SOLUTION INTRAVENOUS at 10:02

## 2024-07-25 RX ADMIN — PREDNISONE 5 MG: 5 TABLET ORAL at 15:44

## 2024-07-25 RX ADMIN — POTASSIUM CHLORIDE 20 MEQ: 1.5 SOLUTION ORAL at 10:13

## 2024-07-25 RX ADMIN — APIXABAN 5 MG: 5 TABLET, FILM COATED ORAL at 08:06

## 2024-07-25 RX ADMIN — COSYNTROPIN 0.25 MG: 0.25 INJECTION, POWDER, LYOPHILIZED, FOR SOLUTION INTRAVENOUS at 08:00

## 2024-07-25 RX ADMIN — Medication: at 17:42

## 2024-07-25 RX ADMIN — BUMETANIDE 4 MG: 0.25 INJECTION INTRAMUSCULAR; INTRAVENOUS at 17:42

## 2024-07-25 RX ADMIN — ALPRAZOLAM 0.25 MG: 0.25 TABLET ORAL at 23:25

## 2024-07-25 RX ADMIN — CHLORHEXIDINE GLUCONATE 15 ML: 1.2 RINSE ORAL at 08:06

## 2024-07-25 RX ADMIN — DOXYCYCLINE 100 MG: 100 CAPSULE ORAL at 08:06

## 2024-07-25 RX ADMIN — DOXYCYCLINE 100 MG: 100 CAPSULE ORAL at 22:55

## 2024-07-25 RX ADMIN — Medication: at 08:06

## 2024-07-25 RX ADMIN — POTASSIUM PHOSPHATE, MONOBASIC POTASSIUM PHOSPHATE, DIBASIC 30 MMOL: 224; 236 INJECTION, SOLUTION, CONCENTRATE INTRAVENOUS at 10:55

## 2024-07-25 RX ADMIN — BUMETANIDE 4 MG: 0.25 INJECTION INTRAMUSCULAR; INTRAVENOUS at 10:55

## 2024-07-25 RX ADMIN — Medication 12.5 MG: at 22:55

## 2024-07-25 RX ADMIN — Medication 12.5 MG: at 08:06

## 2024-07-25 RX ADMIN — CHLORHEXIDINE GLUCONATE 15 ML: 1.2 RINSE ORAL at 22:55

## 2024-07-25 RX ADMIN — APIXABAN 5 MG: 5 TABLET, FILM COATED ORAL at 17:42

## 2024-07-25 RX ADMIN — PREDNISONE 5 MG: 5 TABLET ORAL at 10:11

## 2024-07-25 NOTE — ASSESSMENT & PLAN NOTE
Wt Readings from Last 3 Encounters:   07/25/24 99.6 kg (219 lb 9.3 oz)   07/11/24 108 kg (238 lb)   07/05/24 108 kg (238 lb 1.6 oz)     Pulmonary edema likely due to Afib RVR   At [place] she is on Lasix 20 mg daily.   Echo 7/22/2024 shows left ventricular ejection fraction is >75%. Systolic function is hyperdynamic. Unable to assess diastolic function due to the degree of mitral annular calcification.   Given 1 dose of IV Lasix 7/23 after which she diuresed only 300 ml.  Given Bumex 4 mg yesterday after which she made a UOP of 1.5 L.    Plan:   Resume lasix 20 mg daily on discharge

## 2024-07-25 NOTE — ASSESSMENT & PLAN NOTE
Neurohormonal Blockade:  --Beta Blocker: Lopressor 25 mg q12h  --ARNi / ACEi / ARB: None, kidney function precludes  --Aldosterone Antagonist: None  --SGLT2 Inhibitor: None  --Home Diuretic: PO Lasix 20 mg QD  --Inpatient Diuretic: IV Bumex 4 mg BID    Examines xxx

## 2024-07-25 NOTE — ASSESSMENT & PLAN NOTE
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  Repeat echo does not show any valvular vegetation.  Blood cultures negative at 72 hours.    Plan  Continue doxycycline p.o until inflammatory markers stabilize/normalize in the outpatient setting. May take several weeks  ID following

## 2024-07-25 NOTE — PROGRESS NOTES
formerly [facility]  Progress Note  Name: Bhavna Al I  MRN: [id number]  Unit/Bed#: ICU 02 I Date of Admission: 7/21/2024   Date of Service: 7/25/2024 I Hospital Day: 4    Assessment & Plan   Persistent atrial fibrillation  Rate control: Lopressor 12.5 mg BID  CHADSvasc 5. Stroke prevention with Eliquis 5 mg BID.  Telemetry monitoring.  Ensure K > 4 & Mag > 2    Acute on chronic diastolic heart failure, LVEF 65%, LVIDd 3.3 cm, AHA Stage C  Neurohormonal Blockade:  --Beta Blocker: was on Lopressor 25 mg q12h PTA. Now 12.5 mg q12h.  --ARNi / ACEi / ARB: None, kidney function precludes  --Aldosterone Antagonist: None  --SGLT2 Inhibitor: None. Mostly likely not best choice given bed-bound status with open pressure wounds and risk for UTI.  --Home Diuretic: PO Lasix 20 mg QD  --Inpatient Diuretic: IV Bumex 4 mg BID    Bed weights have not been helpful. She is NET +7 L this admission.   Examines hypervolemic, continue IV diuretic. Use also IV albumin. This will help her BP as well as help with malnutrition which is still an ongoing problem for her.  AM BMP    Subjective:  Pt unable to contribute to ros due to delirium. RN report she is not eating. RN reports she is not interacting much. RN reports a niece visited her once and 2 granddaughters have called for updates during this hospitalization.    Vitals:  Vitals:    07/24/24 0551 07/25/24 0600   Weight: 88.7 kg (195 lb 8.8 oz) 99.6 kg (219 lb 9.3 oz)   ,  Vitals:    07/25/24 0900 07/25/24 1000 07/25/24 1100 07/25/24 1200   BP: 112/53 (!) 110/49 (!) 94/48 98/63   BP Location:       Pulse: 96 80 71 79   Resp: (!) 29 (!) 28 (!) 24 21   Temp:   97.5 °F (36.4 °C)    TempSrc:   Oral    SpO2: 97% 97% 98% 98%   Weight:       Height:         Exam:  Physical Exam  Vitals and nursing note reviewed.   Constitutional:       Comments: Wide awake.    HENT:      Head: Normocephalic and atraumatic.      Nose: Nose normal.      Mouth/Throat:      Mouth: Mucous membranes  are dry.   Eyes:      Comments: Discharge bl eyes   Neck:      Comments: RN is pulling her right cvc now  Cardiovascular:      Rate and Rhythm: Rhythm irregularly irregular.      Heart sounds: S1 normal and S2 normal.      Comments: Hr approx 85 bpm  Pulmonary:      Comments: Poor inspiratory effort, very mild cough    No o2 need    Rales lll decreased breath sounds rll  Abdominal:      Palpations: Abdomen is soft.   Musculoskeletal:      Comments: Edema in the lower extremities is much worse compared to when I saw her 3 days ago. 3+ pitting edema bl le   Skin:     Comments: Desquamation rash chest   Psychiatric:      Comments: Not interacting     Telemetry:       Af with hr averaging 86-92 bpm    Medications:    Current Facility-Administered Medications:     acetaminophen (TYLENOL) tablet 650 mg, 650 mg, Oral, Q4H PRN, AZIZA Batista    ammonium lactate (LAC-HYDRIN) 12 % cream, , Topical, BID, AZIZA Batista, Given at 07/25/24 0806    apixaban (ELIQUIS) tablet 5 mg, 5 mg, Oral, BID, Colleen Gonzalez MD, 5 mg at 07/25/24 0806    bumetanide (BUMEX) injection 4 mg, 4 mg, Intravenous, BID, Junaid Mendoza MD, 4 mg at 07/25/24 1055    chlorhexidine (PERIDEX) 0.12 % oral rinse 15 mL, 15 mL, Mouth/Throat, Q12H FAMILIA, AZIZA Batista, 15 mL at 07/25/24 0806    doxycycline hyclate (VIBRAMYCIN) capsule 100 mg, 100 mg, Oral, Q12H FAMILIA, [place] MD, 100 mg at 07/25/24 0806    levalbuterol (XOPENEX) inhalation solution 1.25 mg, 1.25 mg, Nebulization, Q8H PRN, Colleen Gonzalez MD    metoprolol tartrate (LOPRESSOR) partial tablet 12.5 mg, 12.5 mg, Oral, Q12H FAMILIA, Junaid Mendoza MD, 12.5 mg at 07/25/24 0806    ondansetron (ZOFRAN) injection 4 mg, 4 mg, Intravenous, Q4H PRN, Jeevan Haynes DO, 4 mg at 07/21/24 1241    pantoprazole (PROTONIX) EC tablet 40 mg, 40 mg, Oral, Early Morning, Junaid Mendoza MD    potassium phosphates 30 mmol in sodium chloride 0.9 % 250 mL infusion, 30 mmol, Intravenous, Once, Junaid Mendoza,  MD, Last Rate: 41.7 mL/hr at 07/25/24 1055, 30 mmol at 07/25/24 1055    predniSONE tablet 5 mg, 5 mg, Oral, BID With Meals, Junaid Mendoza MD, 5 mg at 07/25/24 1011    trimethobenzamide (TIGAN) IM injection 200 mg, 200 mg, Intramuscular, Q6H PRN, Jeevan Haynes, DO    Labs/Data:        Results from last 7 days   Lab Units 07/25/24  0436 07/24/24  1251 07/24/24  0436 07/23/24  0446   WBC Thousand/uL 2.84*  --  4.41 6.05   HEMOGLOBIN g/dL 7.9*  --  7.6* 7.6*   HEMATOCRIT % 25.1*  --  24.4* 24.2*   PLATELETS Thousands/uL 78* 81* 89* 105*     Results from last 7 days   Lab Units 07/25/24  0436 07/24/24  1550 07/24/24  0436   POTASSIUM mmol/L 3.2* 3.9 3.1*   CHLORIDE mmol/L 112* 111* 110*   CO2 mmol/L 25 25 24   BUN mg/dL 36* 35* 35*   CREATININE mg/dL 2.52* 2.57* 2.65*     Rosette Pablo PA-C

## 2024-07-25 NOTE — PROGRESS NOTES
Progress Note - Infectious Disease   Bhavna Al 74 y.o. female MRN: [id number]  Unit/Bed#: ICU 02 Encounter: [id number]    Impression/Plan:  1.  Shock.  No infectious etiology found despite very extensive evaluation clinically, microbiologically, and radiographically.  Cultures were all negative.  Patient is weaned off vasopressor support. Consideration for the possibly adrenal insufficiency on this patient with a chronic low-dose steroids being used. She will undergo cosyntropin test this morning. Procalcitonin level was quite high but in the setting of acute kidney injury.  CT chest abdomen pelvis without a definitive source.  She has now weaned off vasopressor support. This morning she is afebrile.  -no indication for antibiotic for this issue  -ongoing oral antibiotic treatment for spinal osteomyelitis as below  -monitor CBCD and BMP  -follow up cosyntropin test   -monitor vitals  -supportive care     2.  MRSA bacteremia.  Undefined primary source but with a chronic rash with some areas of skin breakdown which could be playing a role.  Complicated by thoracic vertebral osteomyelitis as below.  Patient previously cleared her bacteremia and a transthoracic echocardiogram that was without valvular vegetations.  CT of the chest abdomen pelvis without any new complications. Repeat transthoracic echocardiogram without valvular vegetation appreciated.  Patient has now completed appropriate antibiotic for this issue. No indication for ongoing treatment of this issue at this time.  -no indication for ongoing antibiotic for this issue     3. Thoracic vertebral osteomyelitis.  Imaging of the spine in the setting of back pain revealed T9-10 discitis/vertebral osteomyelitis. Likely staphylococcal based on the previous blood culture results.  Sedimentation rate and CRP have been decreasing.  Pain has been improving. The patient has completed IV antibiotic treatment and has transitioned to PO Doxycycline. She is planned to  remain on oral antibiotic treatment until her sed rate normalizes vs plateaus. Patient is planned for outpatient ID follow up after discharge.  -continue PO Doxycycline, 100mg BID  -anticipate ongoing oral antibiotic treatment until her sed rate normalizes vs plateaus  -CBCD and creatinine every other week while on oral antibiotic  -monitor vitals  -patient to follow up in the outpatient ID office after discharge, appointment information placed in the discharge planning     4. Acute kidney injury. This can impact antibiotic dosing. Most likely prerenal issue.  Consideration for the possibility of ATN.  Modest improvement. Creatinine is 2.52 this morning.  -monitor creatinine  -dose adjust antibiotic for renal function as needed  -avoid nephrotoxins  -volume management per critical care team     5. Rheumatoid arthritis.  On Plaquenil and prednisone. Immunosuppression is risk factor for infection.    Above plan was discussed in detail with patient at the bedside.  Above plan was discussed in detail with critical care team who agrees with plan for ongoing oral antibiotic.     Antibiotics:  Doxycycline 1  Antibiotics 5    Subjective:  Patient somewhat drowsy this morning but does offer some interaction. She denies pain in her head, chest, and abdomen. She denies nausea. She denies difficulty breathing. She tells me she's itchy but declined cream when I offered. She offered no other symptoms.    Objective:  Vitals:  Temp:  [97.5 °F (36.4 °C)-98.6 °F (37 °C)] 98.6 °F (37 °C)  HR:  [] 87  Resp:  [12-49] 37  BP: ()/(51-68) 105/65  SpO2:  [97 %-99 %] 99 %  Temp (24hrs), Av.9 °F (36.6 °C), Min:97.5 °F (36.4 °C), Max:98.6 °F (37 °C)  Current: Temperature: 98.6 °F (37 °C)    Physical Exam:   General Appearance:  Drowsy but somewhat interactive. She appears chronically ill and debilitated. She appeared comfortable sitting up in bed in no acute distress.   Throat: Oropharynx moist without lesions.    Lungs:    Clear to auscultation bilaterally; no wheezes, rhonchi or rales; respirations unlabored on room air.   Heart:  RRR; no murmur, rub or gallop.   Abdomen:   Soft, non-tender, non-distended, positive bowel sounds.     Extremities: No clubbing or cyanosis, + pitting pedal edema.   Skin: Dry with scattered flaky rash.     Labs, Imaging, & Other studies:   All pertinent labs and imaging studies were personally reviewed  Results from last 7 days   Lab Units 07/25/24  0436 07/24/24  1251 07/24/24  0436 07/23/24  0446   WBC Thousand/uL 2.84*  --  4.41 6.05   HEMOGLOBIN g/dL 7.9*  --  7.6* 7.6*   PLATELETS Thousands/uL 78* 81* 89* 105*     Results from last 7 days   Lab Units 07/25/24  0436 07/24/24  1550 07/24/24  0436 07/21/24  1818 07/21/24  0933   POTASSIUM mmol/L 3.2*   < > 3.1*   < > 2.7*   CHLORIDE mmol/L 112*   < > 110*   < > 109*   CO2 mmol/L 25   < > 24   < > 20*   BUN mg/dL 36*   < > 35*   < > 24   CREATININE mg/dL 2.52*   < > 2.65*   < > 2.58*   EGFR ml/min/1.73sq m 17   < > 16   < > 17   CALCIUM mg/dL 9.0   < > 8.6   < > 8.7   AST U/L 41*  --  39  --  27   ALT U/L 37  --  30  --  23   ALK PHOS U/L 46  --  43  --  46    < > = values in this interval not displayed.     Results from last 7 days   Lab Units 07/22/24  1025 07/21/24  0933   BLOOD CULTURE   --  No Growth at 72 hrs.  No Growth at 72 hrs.   URINE CULTURE  No Growth <1000 cfu/mL  --

## 2024-07-25 NOTE — ASSESSMENT & PLAN NOTE
Undifferentiated shock; resolved  Vanco level is above goal so she will not need vancomycin at this moment.  Infectious disease on board, appreciate recommendations  Volume resuscitation completed  Echo shows EF is >75%. LV cavity size is normal. Systolic function is hyperdynamic. Severe annular mitral valve calcification. There is trace regurgitation.  Lactic acid cleared  Blood cultures -negative X 5 days  Urine cultures <1000 cfu  ESR normal and CRP were elevated during last ICU stay  Off of pressors since 7/23/2024    Plan:  Please interventions were all during last ICU admission  Discontinued cefepime and vancomycin  Cosyntropin test to check for adrenal insufficiency as the cause for shock  Follow up baseline ACTH and Cortisol  Cosyntropin administered at 8am   1st cortisol at 8.30 am  2nd cortisol at 9am

## 2024-07-25 NOTE — ASSESSMENT & PLAN NOTE
WBC count normal  Procal elevated but downtrending  Bicarb uptrended to normal after bicarb with D5  drip    Plan:  Raquel treated with cefepime and vancomycin but discontinued since blood cultures and urine cultures are negative.  Monitor white count  Continuous cardiopulmonary monitoring

## 2024-07-25 NOTE — PROGRESS NOTES
[facility]  Progress Note  Name: Bhvana Al I  MRN: [id number]  Unit/Bed#: ICU 02 I Date of Admission: 7/21/2024   Date of Service: 7/25/2024 I Hospital Day: 4    Assessment & Plan   Shock (HCC)  Assessment & Plan  Undifferentiated shock  Vanco level is above goal so she will not need vancomycin at this moment.  Infectious disease on board, appreciate recommendations  Volume resuscitation completed  Echo shows EF is >75%. LV cavity size is normal. Systolic function is hyperdynamic. Severe annular mitral valve calcification. There is trace regurgitation.  Lactic acid cleared  Blood cultures -ve 72 hours  Urine cultures <1000 cfu  ESR normal and CRP is elevated  Off of pressors since 7/23/2024    Plan:  Discontinued cefepime and vancomycin  Cosyntropin test to check for adrenal insufficiency as the cause for shock  Follow up baseline ACTH and Cortisol  Cosyntropin administered at 8am   1st cortisol at 8.30 am  2nd cortisol at 9am    * Sepsis (HCC)  Assessment & Plan  WBC count normal  Procal elevated but downtrending  Bicarb uptrended to normal after bicarb with D5  drip    Plan:  Raquel treated with cefepime and vancomycin but discontinued since blood cultures and urine cultures are negative.  Monitor white count  Continuous cardiopulmonary monitoring    Thrombocytopenia (HCC)  Assessment & Plan  Recent Labs     07/24/24  0436 07/24/24  1251 07/25/24  0436   PLT 89* 81* 78*     Patient was on heparin drip. Discontinued on 7/24 and started on Eliquis.  3 points on 4T score. Unlikely HIT.    Lab investigations for thrombocytopenia:  D-dimer elevated at 1.11  Antithrombin III activity is decreased  APTT elevated 48  INR elevated 1.46  Direct Vasquez test is negative  DIC ruled out  Fibrin split products normal  Fibrinogen normal  Hemolysis?  LDH slightly elevated to 96  Reticulocyte count normal.  RI is 0.32 which is less than 2, this means hypoproliferation  Haptoglobin  pending    Plan:  Follow up haptoglobin to rule out hemolysis  Follow up doppler ultrasound of bilateral upper and lower limbs. Patient refused yesterday due to pain.  Hematology consulted.  Recommend monitoring platelets. Thrombocytopenia may be related to osteomyelitis/sepsis or antibiotics (cefepime).    Acute kidney injury superimposed on chronic kidney disease  (HCC)  Assessment & Plan  Recent Labs     07/24/24  0436 07/24/24  1550 07/25/24  0436   CREATININE 2.65* 2.57* 2.52*   EGFR 16 17 17     CKD stage 3a  Creatinine baseline is 1-1.2  Monitor kidney indices  Avoid nephrotoxic medications  Improving    Hypotension  Assessment & Plan  Possibly secondary to sepsis  Weaned off of pressors.  Hold home lasix 20 mg  Metoprolol reduced from 25 mg to 12.5 mg BID  Cardiology on board, recommend adding Diltiazem 30 mg q6 hours for SBP >110 mmHg.   Maintain mean arterial pressure 65 or greater    MRSA bacteremia  Assessment & Plan  History of MRSA bacteremia since possibly secondary to osteomyelitis of the spine  Initially presented on 6/6/2024 with lethargy, poor oral intake  IV daptomycin changed to IV Vanco due to rising CPK through 7/22 per ID then starting PO Doxy   Bracing deferred due to bedbound status, body habitus and underlying skin condition.  Repeat echo does not show any valvular vegetation.  Blood cultures negative at 72 hours.    Plan  Continue doxycycline p.o until inflammatory markers stabilize/normalize in the outpatient setting. May take several weeks  ID following    Elevated troponin  Assessment & Plan  Lab Results   Component Value Date    HSTNI0 1,374 (H) 07/21/2024    HSTNI2 1,239 (H) 07/21/2024    HSTNID2 -135 07/21/2024    HSTNI4 364 (H) 06/29/2024    HSTNID4 84 (H) 06/29/2024     Cardiology consulted  Continuous cardiopulmonary monitoring      Acute on chronic diastolic heart failure, LVEF 65%, LVIDd 3.3 cm, AHA Stage C  Assessment & Plan  Wt Readings from Last 3 Encounters:   07/25/24 99.6  kg (219 lb 9.3 oz)   07/11/24 108 kg (238 lb)   07/05/24 108 kg (238 lb 1.6 oz)     Pulmonary edema likely due to Afib RVR   At [place] she is on Lasix 20 mg daily.   Echo 7/22/2024 shows left ventricular ejection fraction is >75%. Systolic function is hyperdynamic. Unable to assess diastolic function due to the degree of mitral annular calcification.   Given 1 dose of IV Lasix 7/23 after which she diuresed only 300 ml.  Given Bumex 4 mg yesterday after which she made a UOP of 1.5 L.    Plan:   Resume lasix 20 mg daily on discharge        Hypokalemia  Assessment & Plan  Recent Labs     07/24/24  0436 07/24/24  1550 07/25/24  0436   K 3.1* 3.9 3.2*   MG 2.8* 2.7 2.5     Replete as needed  Monitor electrolytes    Chronic pruritic rash in adult  Assessment & Plan  Diagnosis psoriasis proven by biopsy  Continue to use Lac-Hydrin lotion as needed    Persistent atrial fibrillation  Assessment & Plan  Home med is lopressor 25 mg BID, currently on hold  Cardiology consulted  Continuous cardiopulmonary monitor    Deep tissue injury  Assessment & Plan  Float heels  Monitor skin  Continue Lac-Hydrin as needed  Consider wound consult if needed    Ambulatory dysfunction  Assessment & Plan  Patient is bed bound, wheelchair bound and she is vamshi lift at facility at baseline.  No further PT needs.    Morbid obesity (HCC)  Assessment & Plan  Recommend decrease caloric intake and increase activity    Rheumatoid arthritis (HCC)  Assessment & Plan  Continue Tylenol 650 mg p.o. every 6 hours  Continue Plaquenil    Hypoxia-resolved as of 7/24/2024  Assessment & Plan  Requiring 2 L of oxygen and intermittently sounding wet when given IV fluids.  Chest xray showing pulmonary vascular congestion. Mild persistent right upper lobe opacities may be due to edema or pneumonia. No new abnormality.   Now on room air           Disposition: Stepdown Level 2    ICU Core Measures     A: Assess, Prevent, and Manage Pain Has pain been assessed?  Yes  Need for changes to pain regimen? No   B: Both SAT/SAT  N/A   C: Choice of Sedation RASS Goal: N/A patient not on sedation  Need for changes to sedation or analgesia regimen? NA   D: Delirium CAM-ICU: Negative   E: Early Mobility  Plan for early mobility? Yes   F: Family Engagement Plan for family engagement today? Yes       Antibiotic Review: Awaiting culture results.     Review of Invasive Devices:    Corey Plan: Voiding trial after improvement in ambulation   Central access plan: No peripheral access able to be obtained.  Plan remove when transferred out of ICU      Prophylaxis:  VTE VTE covered by:  apixaban, Oral, 5 mg at 07/24/24 1725       Stress Ulcer  covered bypantoprazole (PROTONIX) 40 mg tablet [[id number]] (Long-Term Med), pantoprazole (PROTONIX) injection 40 mg [[id number]]         Significant 24hr Events     24hr events: No acute events overnight. She refused pain medication to take before doppler ultrasound of bilateral upper and lower limbs.     Subjective   Review of Systems: Review of Systems   Constitutional: Negative.    HENT: Negative.     Eyes: Negative.    Respiratory: Negative.     Cardiovascular: Negative.    Gastrointestinal: Negative.    Endocrine: Negative.    Genitourinary: Negative.    Musculoskeletal:  Positive for myalgias (Bilateral leg pain).   Allergic/Immunologic: Negative.    Neurological: Negative.    Hematological: Negative.    Psychiatric/Behavioral: Negative.          Objective                            Vitals I/O      Most Recent Min/Max in 24hrs   Temp 97.5 °F (36.4 °C) Temp  Min: 97.5 °F (36.4 °C)  Max: 98.6 °F (37 °C)   Pulse 78 Pulse  Min: 71  Max: 106   Resp 15 Resp  Min: 12  Max: 49   /57 BP  Min: 94/51  Max: 157/60   O2 Sat 97 % SpO2  Min: 97 %  Max: 99 %      Intake/Output Summary (Last 24 hours) at 7/25/2024 0805  Last data filed at 7/25/2024 0608  Gross per 24 hour   Intake 1219.96 ml   Output 1580 ml   Net -360.04 ml       Diet Dysphagia/Modified  Consistency; Dysphagia 2-Mechanical Soft; Thin Liquid    Invasive Monitoring           Physical Exam   Physical Exam  Vitals and nursing note reviewed.   Eyes:      Extraocular Movements: Extraocular movements intact.      Conjunctiva/sclera: Conjunctivae normal.      Pupils: Pupils are equal, round, and reactive to light.   Skin:     General: Skin is warm, dry and not mottled extremities.      Capillary Refill: Capillary refill takes less than 2 seconds.      Coloration: Skin is not jaundiced or pale.      Findings: Erythema and rash present. No lesion or wound.      Comments: Diffuse erythema -- confirmed psoriasis on biopsy  Hyperalgesic to touch   HENT:      Head: Normocephalic and atraumatic.      Right Ear: Hearing normal.      Left Ear: Hearing normal.      Mouth/Throat:      Mouth: Mucous membranes are moist.   Neck:      Vascular: No JVD.   no midline tenderness Cardiovascular:      Rate and Rhythm: Normal rate and regular rhythm.      Pulses: No decreased pulses.      Heart sounds: No murmur heard.     No friction rub. No gallop.   Musculoskeletal:         General: No swelling, tenderness, deformity or signs of injury. Normal range of motion.      Right lower leg: Trace Edema present.      Left lower leg: Trace Edema present.   Abdominal: General: Abdomen is protuberant. Bowel sounds are decreased. There is no distension.      Palpations: Abdomen is soft.      Tenderness: There is no abdominal tenderness.   Constitutional:       General: She is not in acute distress.     Appearance: She is well-developed and well-nourished. She is ill-appearing. She is not toxic-appearing.      Interventions: She is not sedated, not intubated and not restrained.  Pulmonary:      Effort: Pulmonary effort is normal. She is not intubated.      Breath sounds: No wheezing, rhonchi or rales.   Psychiatric:         Mood and Affect:  mood and affect abnormal.  Neurological:      Mental Status: She is lethargic.      GCS: GCS eye  subscore is 4. GCS verbal subscore is 5. GCS motor subscore is 6.      Cranial Nerves: No facial asymmetry.      Sensory: Sensation is intact.   Genitourinary/Anorectal:  Corey present.          Diagnostic Studies      EKG: Atrial fibrillation HR 78  Imaging: No new imaging obtained      Medications:  Scheduled PRN   ammonium lactate, , BID  apixaban, 5 mg, BID  chlorhexidine, 15 mL, Q12H FAIMLIA  doxycycline hyclate, 100 mg, Q12H FAMILIA  metoprolol tartrate, 12.5 mg, Q12H FAMILIA  pantoprazole, 40 mg, Early Morning      acetaminophen, 650 mg, Q4H PRN  levalbuterol, 1.25 mg, Q8H PRN  ondansetron, 4 mg, Q4H PRN  trimethobenzamide, 200 mg, Q6H PRN       Continuous            Labs:    CBC    Recent Labs     07/24/24  0436 07/24/24  1251 07/25/24  0436   WBC 4.41  --  2.84*   HGB 7.6*  --  7.9*   HCT 24.4*  --  25.1*   PLT 89* 81* 78*   BANDSPCT 2  --   --      BMP    Recent Labs     07/24/24  1550 07/25/24  0436   SODIUM 143 145   K 3.9 3.2*   * 112*   CO2 25 25   AGAP 7 8   BUN 35* 36*   CREATININE 2.57* 2.52*   CALCIUM 8.8 9.0       Coags    Recent Labs     07/24/24  0537 07/24/24  1251   INR 1.50* 1.46*   PTT 50* 48*        Additional Electrolytes  Recent Labs     07/24/24  0436 07/24/24  1550 07/25/24  0436   MG 2.8* 2.7 2.5   PHOS 2.4  --  1.8*          Blood Gas    No recent results  Recent Labs     07/24/24  0436   PHVEN 7.355   BOK0GDK 42.4   PO2VEN 31.9*   CVA4MZJ 23.1*   BEVEN -2.2   [id number] 54.9*    LFTs  Recent Labs     07/24/24  0436 07/25/24  0436   ALT 30 37   AST 39 41*   ALKPHOS 43 46   ALB 3.0* 2.9*   TBILI 0.49 0.54       Infectious  No recent results  Glucose  Recent Labs     07/24/24  0436 07/24/24  1550 07/25/24  0436   [facility] [place] [phone]               Junaid Mendoza MD

## 2024-07-25 NOTE — ASSESSMENT & PLAN NOTE
Recent Labs     07/24/24  0436 07/24/24  1251 07/25/24  0436   PLT 89* 81* 78*     Results of prior workup, patient's platelet count is currently in the 130s  Patient was on heparin drip. Discontinued on 7/24 and started on Eliquis.  3 points on 4T score. Unlikely HIT.    Lab investigations for thrombocytopenia:  D-dimer elevated at 1.11  Antithrombin III activity is decreased  APTT elevated 48  INR elevated 1.46  Direct Vasquez test is negative  DIC ruled out  Fibrin split products normal  Fibrinogen normal  Hemolysis?  LDH slightly elevated to 96  Reticulocyte count normal.  RI is 0.32 which is less than 2, this means hypoproliferation  Haptoglobin pending    Plan:  Follow up haptoglobin to rule out hemolysis  Follow up doppler ultrasound of bilateral upper and lower limbs. Patient refused yesterday due to pain.  Hematology consulted.  Recommend monitoring platelets. Thrombocytopenia may be related to osteomyelitis/sepsis or antibiotics (cefepime).

## 2024-07-25 NOTE — SPEECH THERAPY NOTE
Speech Language/Pathology  Refused lunch for me. Nurse reported pt took only a small amt of liquid supplement at breakfast. Will f/u as able.

## 2024-07-25 NOTE — ASSESSMENT & PLAN NOTE
Home med is lopressor 25 mg BID  Continue digoxin  Check dig level  Cardiology consulted  Continuous cardiopulmonary monitoring

## 2024-07-25 NOTE — ASSESSMENT & PLAN NOTE
Rate control: Lopressor 12.5 mg BID  CHADSvasc 5. Stroke prevention with Eliquis 5 mg BID.  Telemetry monitoring.  Ensure K > 4 & Mag > 2

## 2024-07-25 NOTE — PLAN OF CARE
Problem: Nutrition/Hydration-ADULT  Goal: Nutrient/Hydration intake appropriate for improving, restoring or maintaining nutritional needs  Description: Monitor and assess patient's nutrition/hydration status for malnutrition. Collaborate with interdisciplinary team and initiate plan and interventions as ordered.  Monitor patient's weight and dietary intake as ordered or per policy. Utilize nutrition screening tool and intervene as necessary. Determine patient's food preferences and provide high-protein, high-caloric foods as appropriate.     INTERVENTIONS:  - Monitor oral intake, urinary output, labs, and treatment plans  - Assess nutrition and hydration status and recommend course of action  - Evaluate amount of meals eaten  - Assist patient with eating if necessary   - Allow adequate time for meals  - Recommend/ encourage appropriate diets, oral nutritional supplements, and vitamin/mineral supplements  - Order, calculate, and assess calorie counts as needed  - Recommend, monitor, and adjust tube feedings and TPN/PPN based on assessed needs  - Assess need for intravenous fluids  - Provide specific nutrition/hydration education as appropriate  - Include patient/family/caregiver in decisions related to nutrition  Outcome: Progressing     Problem: HEMATOLOGIC - ADULT  Goal: Maintains hematologic stability  Description: INTERVENTIONS  - Assess for signs and symptoms of bleeding or hemorrhage  - Monitor labs  - Administer supportive blood products/factors as ordered and appropriate  Outcome: Progressing     Problem: METABOLIC, FLUID AND ELECTROLYTES - ADULT  Goal: Electrolytes maintained within normal limits  Description: INTERVENTIONS:  - Monitor labs and assess patient for signs and symptoms of electrolyte imbalances  - Administer electrolyte replacement as ordered  - Monitor response to electrolyte replacements, including repeat lab results as appropriate  - Instruct patient on fluid and nutrition as  appropriate  Outcome: Progressing  Goal: Fluid balance maintained  Description: INTERVENTIONS:  - Monitor labs   - Monitor I/O and WT  - Instruct patient on fluid and nutrition as appropriate  - Assess for signs & symptoms of volume excess or deficit  Outcome: Progressing  Goal: Glucose maintained within target range  Description: INTERVENTIONS:  - Monitor Blood Glucose as ordered  - Assess for signs and symptoms of hyperglycemia and hypoglycemia  - Administer ordered medications to maintain glucose within target range  - Assess nutritional intake and initiate nutrition service referral as needed  Outcome: Progressing     Problem: RESPIRATORY - ADULT  Goal: Achieves optimal ventilation and oxygenation  Description: INTERVENTIONS:  - Assess for changes in respiratory status  - Assess for changes in mentation and behavior  - Position to facilitate oxygenation and minimize respiratory effort  - Oxygen administered by appropriate delivery if ordered  - Initiate smoking cessation education as indicated  - Encourage broncho-pulmonary hygiene including cough, deep breathe, Incentive Spirometry  - Assess the need for suctioning and aspirate as needed  - Assess and instruct to report SOB or any respiratory difficulty  - Respiratory Therapy support as indicated  Outcome: Progressing

## 2024-07-25 NOTE — ASSESSMENT & PLAN NOTE
Possibly secondary to sepsis  Weaned off of pressors.  Hold home lasix 20 mg  Metoprolol reduced from 25 mg to 12.5 mg BID  Cardiology on board, recommend adding Diltiazem 30 mg q6 hours for SBP >110 mmHg.   Maintain mean arterial pressure 65 or greater

## 2024-07-25 NOTE — ASSESSMENT & PLAN NOTE
Recent Labs     07/24/24  0436 07/24/24  1550 07/25/24  0436   CREATININE 2.65* 2.57* 2.52*   EGFR 16 17 17     CKD stage 3a  Creatinine baseline is 1-1.2  Monitor kidney indices  Avoid nephrotoxic medications  Improving

## 2024-07-25 NOTE — CASE MANAGEMENT
Case Management Discharge Planning Note    Patient name Bhavna Al  Location ICU 02/ICU 02 MRN [id number]  : 1950 Date 2024       Current Admission Date: 2024  Current Admission Diagnosis:Sepsis ([facility])   Patient Active Problem List    Diagnosis Date Noted Date Diagnosed    Sepsis ([facility]) 2024     Acute kidney injury superimposed on chronic kidney disease  ([facility]) 2024     Hypokalemia 2024     Chronic pruritic rash in adult 2024     Secondary adrenal insufficiency ([facility]) 2024     Pressure ulcer of left buttock, stage 3 ([facility]) 2024     Acute osteomyelitis of thoracic spine ([facility]) 2024     MRSA bacteremia 2024     Hypotension 2024     Dysphagia 2024     Deep tissue injury 2024     Hypernatremia 2024     Localized swelling on left hand 2023     Stage 3 chronic kidney disease ([facility]) 2023     Febrile illness 2023     Dermatitis associated with incontinence 2023     Right shoulder pain 12/15/2022     Abnormal urinalysis 2022     Ambulatory dysfunction 2022     Hyperuricemia 2022     Acute metabolic encephalopathy 2022     Acute on chronic diastolic heart failure, LVEF 65%, LVIDd 3.3 cm, AHA Stage C 2022     Acute bronchitis 2022     Assistance needed with transportation 09/15/2022     Abnormal CT of the chest 2022     Gram-positive cocci bacteremia 2022     Psoriasis 2022     Elevated troponin 2022     COVID-19 2022     Shock ([facility]) 01/10/2022     Persistent atrial fibrillation 01/10/2022     Hyperparathyroidism ([facility]) 2021     Murmur, cardiac 2021     BPPV (benign paroxysmal positional vertigo) 2018     Seasonal allergic rhinitis due to pollen 2018     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     Osteoporosis 2016     Leukopenia 2016     Thrombocytopenia ([facility]) 2016     Rheumatoid arthritis ([facility])  08/23/2016     GERD (gastroesophageal reflux disease) 08/23/2016     Sarcoid 08/23/2016     Morbid obesity (HCC) 07/12/2016     Vitamin D deficiency 07/16/2015     Essential hypertension 12/04/2014     Lumbar radiculopathy 12/04/2014       LOS (days): 4  Geometric Mean LOS (GMLOS) (days): 5.1  Days to GMLOS:0.9     OBJECTIVE:  Risk of Unplanned Readmission Score: 37.78       Current admission status: Inpatient   Preferred Pharmacy:   [id number] - [place], [place] - [address]  [address]  [place] [address]  Phone: [phone] Fax: [phone]    [facility] - [address] [address]  Phone: [phone] Fax: [phone]    Primary Care Provider: Nya Taveras DO    Primary Insurance: MEDICARE  Secondary Insurance: [facility]    DISCHARGE DETAILS:    Contacts  Patient Contacts: Rhoda Al (Child)  [phone]  Relationship to Patient:: Family  Contact Method: Phone  Phone Number: [phone]  Reason/Outcome: Discharge Planning    Treatment Team Recommendation: SNF  Discharge Destination Plan:: SNF  Transport at Discharge : Elis zabala         Additional Comments: Patient to be discharged back to [facility] once stable for discharge. Reservation in [place] for the facility and updated clinicals uploaded.  Patient is scheduled for possible downgrade to med-surg today.  CM department will continue to follow patient through hospital discharge.

## 2024-07-25 NOTE — ASSESSMENT & PLAN NOTE
Recent Labs     07/24/24  0436 07/24/24  1550 07/25/24  0436   K 3.1* 3.9 3.2*   MG 2.8* 2.7 2.5     Replete as needed  Monitor electrolytes

## 2024-07-25 NOTE — PROGRESS NOTES
Critical Care Interval Transfer Note:    Brief Hospital Summary:   74-year-old female who presented from long-term facility due to abdominal pain, decreased oral intake and low blood pressure.  She has PMHx of MRSA bactermia suspected secondary to osteomyelitis, chronic diastolic heart failure, A-fib on Eliquis, GERD, HTN, RA on Plaquenil and chronic corticosteroid use.  A rapid response was called while she was in the ED today due to hypotension but manual blood pressures were okay and she was admitted to the medical surgical unit.  Later in the day and at the rapid response was called due to increased work of breathing after she had received IV fluids.  She was given IV Lasix after which her breathing improved but she became hypotensive requiring IV pressors and admitted to the ICU.  Patient was noted to have atrial fibrillation with rapid ventricular response and given 1 dose of digoxin. Patient was admitted for shock requiring pressors which were weaned off on day 2 of admission.  Due to her history of MRSA bacteremia and she had recently finished vancomycin, patient was started on cefepime and vancomycin and infectious disease was consulted.  Blood cultures and urine cultures have been negative.  Antibiotics discontinued and she has been started on doxycycline per ID which will continue in the outpatient setting until her ESR normalizes.  She underwent cosyntropin stimulation testing to check for adrenal insufficiency due to chronic corticosteroid use.  She is placed back on her home dose steroid. Switched from heparin back to eliquis due to thrombocytopenia.  Hematology on board but suspect it to be due to bone marrow suppression from antibiotic use.  Due to low UOP she is given Bumex with good response so today she will receive 2 doses. Appears to be slightly more confused today which is suspected to be delirium. Central line removed and placed midline today.       Barriers to discharge:   Improvement in AMANDA    Thrombocytopenia workup  Patient will return to [facility]     Consults: IP CONSULT TO CARDIOLOGY  IP CONSULT TO CASE MANAGEMENT  IP CONSULT TO INFECTIOUS DISEASES  IP CONSULT TO INFECTIOUS DISEASES  IP CONSULT TO PHARMACY  IP CONSULT TO HEMATOLOGY    Recommended to review admission imaging for incidental findings and document in discharge navigator: Chart reviewed, no known incidental findings noted at this time.      Discharge Plan: Anticipate discharge in 24-48 hrs to prior assisted or independent living facility.  Corey Plan: Continue for accurate I/O monitoring for 24 hours since she is getting another dose of Bumex.  Central access plan: No peripheral access able to be obtained.  Plan for midline today.            Patient seen and evaluated by Critical Care today and deemed to be appropriate for transfer to Med Surg. Spoke to Dr Fisher from [facility] to accept transfer. Critical care can be contacted via [facility] with any questions or concerns.

## 2024-07-26 PROBLEM — R79.89 ELEVATED TROPONIN: Status: ACTIVE | Noted: 2024-07-26

## 2024-07-26 LAB
ACTH PLAS-MCNC: 19.9 PG/ML (ref 7.2–63.3)
ANION GAP SERPL CALCULATED.3IONS-SCNC: 9 MMOL/L (ref 4–13)
BACTERIA BLD CULT: NORMAL
BACTERIA BLD CULT: NORMAL
BASOPHILS # BLD AUTO: 0.02 THOUSANDS/ÂΜL (ref 0–0.1)
BASOPHILS NFR BLD AUTO: 0 % (ref 0–1)
BUN SERPL-MCNC: 32 MG/DL (ref 5–25)
CALCIUM SERPL-MCNC: 9.9 MG/DL (ref 8.4–10.2)
CHLORIDE SERPL-SCNC: 111 MMOL/L (ref 96–108)
CO2 SERPL-SCNC: 27 MMOL/L (ref 21–32)
CREAT SERPL-MCNC: 2.17 MG/DL (ref 0.6–1.3)
EOSINOPHIL # BLD AUTO: 0.08 THOUSAND/ÂΜL (ref 0–0.61)
EOSINOPHIL NFR BLD AUTO: 1 % (ref 0–6)
ERYTHROCYTE [DISTWIDTH] IN BLOOD BY AUTOMATED COUNT: 16.8 % (ref 11.6–15.1)
GFR SERPL CREATININE-BSD FRML MDRD: 21 ML/MIN/1.73SQ M
GLUCOSE SERPL-MCNC: 119 MG/DL (ref 65–140)
GLUCOSE SERPL-MCNC: 213 MG/DL (ref 65–140)
GLUCOSE SERPL-MCNC: 79 MG/DL (ref 65–140)
GLUCOSE SERPL-MCNC: 82 MG/DL (ref 65–140)
GLUCOSE SERPL-MCNC: 89 MG/DL (ref 65–140)
HCT VFR BLD AUTO: 30 % (ref 34.8–46.1)
HGB BLD-MCNC: 9.5 G/DL (ref 11.5–15.4)
IMM GRANULOCYTES # BLD AUTO: 0.05 THOUSAND/UL (ref 0–0.2)
IMM GRANULOCYTES NFR BLD AUTO: 1 % (ref 0–2)
LYMPHOCYTES # BLD AUTO: 0.88 THOUSANDS/ÂΜL (ref 0.6–4.47)
LYMPHOCYTES NFR BLD AUTO: 15 % (ref 14–44)
MCH RBC QN AUTO: 27.5 PG (ref 26.8–34.3)
MCHC RBC AUTO-ENTMCNC: 31.7 G/DL (ref 31.4–37.4)
MCV RBC AUTO: 87 FL (ref 82–98)
MONOCYTES # BLD AUTO: 0.5 THOUSAND/ÂΜL (ref 0.17–1.22)
MONOCYTES NFR BLD AUTO: 9 % (ref 4–12)
NEUTROPHILS # BLD AUTO: 4.21 THOUSANDS/ÂΜL (ref 1.85–7.62)
NEUTS SEG NFR BLD AUTO: 74 % (ref 43–75)
NRBC BLD AUTO-RTO: 1 /100 WBCS
PLATELET # BLD AUTO: 122 THOUSANDS/UL (ref 149–390)
PMV BLD AUTO: 13.8 FL (ref 8.9–12.7)
POTASSIUM SERPL-SCNC: 4.6 MMOL/L (ref 3.5–5.3)
RBC # BLD AUTO: 3.45 MILLION/UL (ref 3.81–5.12)
SODIUM SERPL-SCNC: 147 MMOL/L (ref 135–147)
WBC # BLD AUTO: 5.74 THOUSAND/UL (ref 4.31–10.16)

## 2024-07-26 PROCEDURE — 82948 REAGENT STRIP/BLOOD GLUCOSE: CPT

## 2024-07-26 PROCEDURE — 99233 SBSQ HOSP IP/OBS HIGH 50: CPT | Performed by: INTERNAL MEDICINE

## 2024-07-26 PROCEDURE — 92526 ORAL FUNCTION THERAPY: CPT

## 2024-07-26 PROCEDURE — 80048 BASIC METABOLIC PNL TOTAL CA: CPT | Performed by: INTERNAL MEDICINE

## 2024-07-26 PROCEDURE — 99232 SBSQ HOSP IP/OBS MODERATE 35: CPT | Performed by: INTERNAL MEDICINE

## 2024-07-26 PROCEDURE — 85025 COMPLETE CBC W/AUTO DIFF WBC: CPT | Performed by: INTERNAL MEDICINE

## 2024-07-26 RX ORDER — DIGOXIN 0.25 MG/ML
125 INJECTION INTRAMUSCULAR; INTRAVENOUS ONCE
Status: COMPLETED | OUTPATIENT
Start: 2024-07-26 | End: 2024-07-26

## 2024-07-26 RX ADMIN — DIGOXIN 125 MCG: 250 INJECTION, SOLUTION INTRAMUSCULAR; INTRAVENOUS; PARENTERAL at 15:48

## 2024-07-26 RX ADMIN — PANTOPRAZOLE SODIUM 40 MG: 40 TABLET, DELAYED RELEASE ORAL at 05:21

## 2024-07-26 RX ADMIN — Medication: at 08:19

## 2024-07-26 RX ADMIN — CHLORHEXIDINE GLUCONATE 15 ML: 1.2 RINSE ORAL at 08:22

## 2024-07-26 RX ADMIN — APIXABAN 5 MG: 5 TABLET, FILM COATED ORAL at 08:18

## 2024-07-26 RX ADMIN — DOXYCYCLINE 100 MG: 100 CAPSULE ORAL at 08:18

## 2024-07-26 RX ADMIN — Medication 12.5 MG: at 08:18

## 2024-07-26 RX ADMIN — DOXYCYCLINE 100 MG: 100 CAPSULE ORAL at 20:23

## 2024-07-26 RX ADMIN — Medication: at 17:22

## 2024-07-26 RX ADMIN — Medication 12.5 MG: at 20:23

## 2024-07-26 RX ADMIN — CHLORHEXIDINE GLUCONATE 15 ML: 1.2 RINSE ORAL at 20:27

## 2024-07-26 RX ADMIN — PREDNISONE 5 MG: 5 TABLET ORAL at 08:18

## 2024-07-26 RX ADMIN — PREDNISONE 5 MG: 5 TABLET ORAL at 17:22

## 2024-07-26 RX ADMIN — APIXABAN 5 MG: 5 TABLET, FILM COATED ORAL at 17:22

## 2024-07-26 NOTE — ASSESSMENT & PLAN NOTE
This is a 74-year-old female who lives at [facility] presented with abdominal pain decreased oral intake and low blood pressure.  She has history of MRSA bacteremia currently on antibiotics, chronic diastolic CHF, atrial fibrillation on Eliquis, GERD, hypertension, rheumatoid arthritis on Plaquenil and chronic steroids.  She was initially admitted to the [facility] floor after which rapid response was called and patient was brought to ICU for persistent hypotension requiring IV fluids and IV pressors.    Unclear etiology  Patient off pressors  Cosyntropin test 35-39  Hemodynamically stable, monitor vital signs

## 2024-07-26 NOTE — PROGRESS NOTES
Cardiology Progress Note   MD Osvaldo Reyes MD, [facility]  Kostas Bruce DO, [facility]  MD Coby Dumont DO, [facility]  Kelton Rodriguez DO, [facility]  ----------------------------------------------------------------  [address] [address]    Bhavna Davidsoncon 74 y.o. female MRN: [id number]  Unit/Bed#: [address] 220-01 Encounter: [id number]      ASSESSMENT:   Septic shock secondary to MRSA bacteremia from suspected T9-T10 osteomyelitis  Persistent atrial fibrillation with intermittent RVR  Acute kidney injury on CKD  Nonischemic troponin elevation secondary to sepsis, acute kidney injury  Acquired adrenal insufficiency  Acute on chronic HFpEF  LVEF > 75%, severe MAC, trace MR, mild TR with PASP 65 mmHg, he has July 2024  Severe rheumatoid arthritis  Severe ambulatory dysfunction  Psoriasis    PLAN:  Renal function continues to improve with IV diuresis  Weights slowly trending downward over the past 24 hours  Continue Bumex 4 mg IV twice daily with low threshold to start drip  Strict I's/O's and daily weights  Keep potassium greater than 4 magnesium greater than 2  Continue metoprolol  Will give digoxin level of 0.125 mg IV x 1  Eliquis for thromboembolic prophylaxis  Supportive Care    Signed: Kostas Bruce DO, [facility], LINDSEY MATHEWS, FACP      History of Present Illness:  Patient seen and examined.  No acute events overnight.  Minimally responsive to questioning, but tracks with her eyes.      Review of Systems:  Review of systems unobtainable secondary to mental status.    Allergies   Allergen Reactions    Shellfish-Derived Products - Food Allergy Itching    Methotrexate Derivatives     Amoxicillin Rash    Ampicillin-Sulbactam Sodium Rash       No current facility-administered medications on file prior to encounter.     Current Outpatient Medications on File Prior to Encounter   Medication Sig    ammonium lactate (LAC-HYDRIN) 12 % cream Apply topically 2 (two)  times a day    acetaminophen (TYLENOL) 325 mg tablet Take 2 tablets (650 mg total) by mouth every 4 (four) hours as needed for mild pain, headaches or fever. (Patient taking differently: Take 650 mg by mouth every 6 (six) hours as needed for mild pain, headaches or fever)    albuterol (Ventolin HFA) 90 mcg/act inhaler Inhale 2 puffs every 6 (six) hours as needed for wheezing    [START ON 7/28/2024] alendronate (FOSAMAX) 70 mg tablet Take 70 mg by mouth every 7 days Do not start before July 28, 2024.    apixaban (ELIQUIS) 5 mg Take 1 tablet (5 mg total) by mouth 2 (two) times a day    bisacodyl (FLEET) 10 MG/30ML ENEM Insert 10 mg into the rectum once (Patient not taking: Reported on 7/17/2024)    cholecalciferol (VITAMIN D3) 1,000 units tablet Take 1 tablet (1,000 Units total) by mouth daily    clobetasol (TEMOVATE) 0.05 % cream Apply topically 2 (two) times a day    furosemide (LASIX) 20 mg tablet Take 1 tablet (20 mg total) by mouth daily    hydroxychloroquine (PLAQUENIL) 200 mg tablet Take 1 tablet (200 mg total) by mouth 2 (two) times a day    lidocaine (LIDODERM) 5 % Apply 2 patches topically daily Remove & Discard patch within 12 hours or as directed by MD Do not start before December 20, 2022. (Patient taking differently: Apply 2 patches topically daily Remove & Discard patch within 12 hours or as directed by MD)    metoprolol tartrate (LOPRESSOR) 25 mg tablet Take 1 tablet (25 mg total) by mouth every 12 (twelve) hours    miconazole 2 % cream Apply topically 2 (two) times a day    nystatin (MYCOSTATIN) powder Apply topically 2 (two) times a day    pantoprazole (PROTONIX) 40 mg tablet Take 1 tablet (40 mg total) by mouth daily in the early morning    predniSONE 5 mg tablet Take 1 tablet (5 mg total) by mouth 2 (two) times a day with meals Do not start before June 5, 2023.    triamcinolone (KENALOG) 0.1 % cream Apply topically 2 (two) times a day Apply to BUE and BLE topically everyday and evening shift for  psoriasis    Zinc 50 MG TABS Take 1 tablet by mouth in the morning        Current Facility-Administered Medications   Medication Dose Route Frequency Provider Last Rate    acetaminophen  650 mg Oral Q4H PRN Junaid Mendoza MD      ammonium lactate   Topical BID Junaid Mendoza MD      apixaban  5 mg Oral BID Junaid Mendoza MD      chlorhexidine  15 mL Mouth/Throat Q12H FAMILIA Junaid Mendoza MD      doxycycline hyclate  100 mg Oral Q12H FAMILIA Junaid Mendoza MD      levalbuterol  1.25 mg Nebulization Q8H PRN Junaid Mendoza MD      metoprolol tartrate  12.5 mg Oral Q12H FAMILIA Junaid Mendoza MD      ondansetron  4 mg Intravenous Q4H PRN Junaid Mendoza MD      pantoprazole  40 mg Oral Early Morning Junaid Mendoza MD      predniSONE  5 mg Oral BID With Meals Junaid Mendoza MD      trimethobenzamide  200 mg Intramuscular Q6H PRN Junaid Mendoza MD              Vitals:    07/26/24 0721 07/26/24 0900 07/26/24 1112 07/26/24 1448   BP: 116/75  91/57 127/82   BP Location:       Pulse: (!) 106  102 (!) 108   Resp: 16  18 18   Temp: 98.4 °F (36.9 °C)  98.6 °F (37 °C) 98 °F (36.7 °C)   TempSrc:       SpO2: 96% 96% 97% 97%   Weight:       Height:         Body mass index is 36.32 kg/m².      Intake/Output Summary (Last 24 hours) at 7/26/2024 1537  Last data filed at 7/26/2024 1301  Gross per 24 hour   Intake 340 ml   Output 2890 ml   Net -2550 ml       Weight change: -0.6 kg (-1 lb 5.2 oz)    PHYSICAL EXAMINATION:  Gen: Awake, confused, NAD  Head/eyes: AT/NC, pupils equal and round, Anicteric  ENT: mmm  Neck: Supple, No elevated JVP, trachea midline  Resp: CTA bilaterally no w/r/r  CV: irreg irreg +S1, S2, No m/r/g  Abd: Soft, NT/ND + BS  Ext: +1 pitting LE edema bilaterally  Neuro: Does not follow commands, but moves all extermities  Psych: Blunted affect, normal mood, pleasant attitude, non-combative  Skin: warm; no rash, erythema or venous stasis changes on exposed skin    Lab Results:  Results from last 7 days   Lab Units 07/26/24  0982   WBC  "Thousand/uL 5.74   HEMOGLOBIN g/dL 9.5*   HEMATOCRIT % 30.0*   PLATELETS Thousands/uL 122*     Results from last 7 days   Lab Units 07/26/24  0924 07/25/24  1553 07/25/24  0436   POTASSIUM mmol/L 4.6   < > 3.2*   CHLORIDE mmol/L 111*   < > 112*   CO2 mmol/L 27   < > 25   BUN mg/dL 32*   < > 36*   CREATININE mg/dL 2.17*   < > 2.52*   CALCIUM mg/dL 9.9   < > 9.0   ALK PHOS U/L  --   --  46   ALT U/L  --   --  37   AST U/L  --   --  41*    < > = values in this interval not displayed.     No results found for: \"TROPONINT\"      Results from last 7 days   Lab Units 07/21/24  1230 07/21/24  0933   HS TNI 0HR ng/L  --  1,374*   HS TNI 2HR ng/L 1,239*  --      Results from last 7 days   Lab Units 07/24/24  1251   INR  1.46*       Tele: AF 90s to 120s    This note was completed in part utilizing M-Modal Fluency Direct Software.  Grammatical errors, random word insertions, spelling mistakes, and incomplete sentences may be an occasional consequence of this system secondary to software limitations, ambient noise, and hardware issues.  If you have any questions or concerns about the content, text, or information contained within the body of this dictation, please contact the provider for clarification.      "

## 2024-07-26 NOTE — PROGRESS NOTES
[facility]  Progress Note  Name: Bhavna Al I  MRN: [id number]  Unit/Bed#: [place] [address] -01 I Date of Admission: 7/21/2024   Date of Service: 7/26/2024 I Hospital Day: 5    Assessment & Plan   Shock ([facility])  Assessment & Plan  This is a 74-year-old female who lives at [facility] presented with abdominal pain decreased oral intake and low blood pressure.  She has history of MRSA bacteremia currently on antibiotics, chronic diastolic CHF, atrial fibrillation on Eliquis, GERD, hypertension, rheumatoid arthritis on Plaquenil and chronic steroids.  She was initially admitted to the [facility] floor after which rapid response was called and patient was brought to ICU for persistent hypotension requiring IV fluids and IV pressors.    Unclear etiology  Patient off pressors  Cosyntropin test 35-39  Hemodynamically stable, monitor vital signs    * Sepsis ([facility])  Assessment & Plan  Patient presenting with sepsis of possible intra-abdominal versus orthopedic etiology given previous history of osteomyelitis of the spine  Sepsis as evidenced by tachycardia, elevated respiratory rate  Infectious disease input appreciated    Elevated troponin  Assessment & Plan  Nonischemic troponin elevation secondary to sepsis, shock, acute kidney injury  Cardiology input appreciated    Chronic pruritic rash in adult  Assessment & Plan  She has psoriasis proven by skin biopsy  use Lac-Hydrin lotion as needed    Hypokalemia  Assessment & Plan  Will replete  Hold furosemide  Recent Labs     07/21/24  0933   K 2.7*           Acute kidney injury superimposed on chronic kidney disease  ([facility])  Assessment & Plan  Acute renal failure Most likely pre-renal AMANDA 2/2 volume depletion/shock  Baseline creatinine 1-1.2  Renal function trending down  Monitor BMP    Persistent atrial fibrillation  Assessment & Plan  Cardiology follow-up appreciated given digoxin 0.125 mg IV x 1  Eliquis for anticoagulation    MRSA  bacteremia  Assessment & Plan  Undefined primary source possibly due to chronic rash with areas of skin breakdown gated by thoracic vertebral osteomyelitis  Infectious disease input appreciated continue with doxycycline    Deep tissue injury  Assessment & Plan  Pressure ulcer of the buttocks during last admission  Specialty bed ordered given soft tissue injury and bedbound status      Ambulatory dysfunction  Assessment & Plan  Currently at [facility] for IV antibiotics due to recent MRSA bacteremia through 7/22  Continue PT OT consultations once clinically stable    Acute on chronic diastolic heart failure, LVEF 65%, LVIDd 3.3 cm, AHA Stage C  Assessment & Plan  Wt Readings from Last 3 Encounters:   07/26/24 99 kg (218 lb 4.1 oz)   07/11/24 108 kg (238 lb)   07/05/24 108 kg (238 lb 1.6 oz)     7/22 2D echo revealed ejection fraction greater than 75%, wall motion normal  Patient did have pulmonary edema likely due to A-fib with RVR  Cardiology follow-up appreciated  Continue Bumex 4 mg IV twice daily    Morbid obesity (HCC)  Assessment & Plan  Recommend outpatient weight loss when appropriate    Rheumatoid arthritis (HCC)  Assessment & Plan  Continue Plaquenil 200 mg  Prior to admission on prednisone 5 mg twice a day  Does have psoriasis    Thrombocytopenia (HCC)  Assessment & Plan  Hematology input appreciated  Likely HIT mild DIC ruled out, direct Vasquez test negative  Platelet counts improving, monitor             Mobility:  Basic Mobility Inpatient Raw Score: 6  JH-HLM Goal: 2: Bed activities/Dependent transfer  JH-HLM Achieved: 1: Laying in bed  JH-HLM Goal NOT achieved. Continue with multidisciplinary rounding and encourage appropriate mobility to improve upon JH-HLM goals.    VTE Pharmacologic Prophylaxis:   Pharmacologic: eliquis    Patient Centered Rounds: I have performed bedside rounds with nursing staff today.    Discussions with Specialists or Other Care Team Provider: Infectious disease    Education and  Discussions with Family / Patient: Updated niece, LM for granddaughter Devorah    Time Spent for Care:   More than 50% of total time spent on counseling and coordination of care as described above.    Current Length of Stay: 5 day(s)    Current Patient Status: Inpatient   Certification Statement: The patient will continue to require additional inpatient hospital stay due to IV diuresis    Discharge Plan / Estimated Discharge Date: TBD    Code Status: Level 1 - Full Code      Subjective:   Patient seen and examined at bedside, stable, no events overnight    Objective:     Vitals:   Temp (24hrs), Av.6 °F (37 °C), Min:98 °F (36.7 °C), Max:99.6 °F (37.6 °C)    Temp:  [98 °F (36.7 °C)-99.6 °F (37.6 °C)] 98 °F (36.7 °C)  HR:  [] 108  Resp:  [16-20] 18  BP: ()/(57-82) 127/82  SpO2:  [96 %-98 %] 97 %  Body mass index is 36.32 kg/m².     Input and Output Summary (last 24 hours):       Intake/Output Summary (Last 24 hours) at 2024 1650  Last data filed at 2024 1301  Gross per 24 hour   Intake 340 ml   Output 2275 ml   Net -1935 ml       Physical Exam:    Constitutional: Patient is in no acute distress, chronically ill  HEENT:  Normocephalic, atraumatic  Cardiovascular: Normal S1S2, irregular, No murmurs/rubs/gallops appreciated.  Pulmonary:  Bilateral air entry, No rhonchi/rales/wheezing appreciated  Abdominal: Soft, Bowel sounds present, Non-tender, Non-distended  Extremities: Extremity edema  Neurological: awake, alert    Additional Data:     Labs:    Results from last 7 days   Lab Units 24  0924   WBC Thousand/uL 5.74   HEMOGLOBIN g/dL 9.5*   HEMATOCRIT % 30.0*   PLATELETS Thousands/uL 122*   SEGS PCT % 74   LYMPHO PCT % 15   MONO PCT % 9   EOS PCT % 1     Results from last 7 days   Lab Units 24  0924 24  1553 24  0436   POTASSIUM mmol/L 4.6   < > 3.2*   CHLORIDE mmol/L 111*   < > 112*   CO2 mmol/L 27   < > 25   BUN mg/dL 32*   < > 36*   CREATININE mg/dL 2.17*   < > 2.52*    CALCIUM mg/dL 9.9   < > 9.0   ALK PHOS U/L  --   --  46   ALT U/L  --   --  37   AST U/L  --   --  41*    < > = values in this interval not displayed.     Results from last 7 days   Lab Units 07/24/24  1251   INR  1.46*        I Have Reviewed All Lab Data Listed Above.    Invasive Devices       Peripheral Intravenous Line  Duration             Peripheral IV 07/25/24 Left Forearm 1 day              Drain  Duration             Urethral Catheter 4 days                         Recent Cultures (last 7 days):     Results from last 7 days   Lab Units 07/22/24  1025 07/21/24  0933   BLOOD CULTURE   --  No Growth After 5 Days.  No Growth After 5 Days.   URINE CULTURE  No Growth <1000 cfu/mL  --        Last 24 Hours Medication List:   Current Facility-Administered Medications   Medication Dose Route Frequency Provider Last Rate    acetaminophen  650 mg Oral Q4H PRN Junaid Mendoza MD      ammonium lactate   Topical BID Junaid Mendoza MD      apixaban  5 mg Oral BID Junaid Mendoza MD      chlorhexidine  15 mL Mouth/Throat Q12H FAMILIA Junaid Mendoza MD      doxycycline hyclate  100 mg Oral Q12H FAMILIA Junaid Mendoza MD      levalbuterol  1.25 mg Nebulization Q8H PRN Junaid Mendoza MD      metoprolol tartrate  12.5 mg Oral Q12H [facility] Junaid Mendoza MD      ondansetron  4 mg Intravenous Q4H PRN Junaid Mendoza MD      pantoprazole  40 mg Oral Early Morning Junaid Mendoza MD      predniSONE  5 mg Oral BID With Meals Junaid Mendoza MD      trimethobenzamide  200 mg Intramuscular Q6H PRN Junaid Mendoza MD          Today, Patient Was Seen By: Leslie Chadwick MD

## 2024-07-26 NOTE — ASSESSMENT & PLAN NOTE
Wt Readings from Last 3 Encounters:   07/26/24 99 kg (218 lb 4.1 oz)   07/11/24 108 kg (238 lb)   07/05/24 108 kg (238 lb 1.6 oz)     7/22 2D echo revealed ejection fraction greater than 75%, wall motion normal  Patient did have pulmonary edema likely due to A-fib with RVR  Cardiology follow-up appreciated  Continue Bumex 4 mg IV twice daily

## 2024-07-26 NOTE — WOUND OSTOMY CARE
Progress Note - Wound   Bhavna P Mikaela 74 y.o. female MRN: [id number]  Unit/Bed#: [place] [address] -01 Encounter: [id number]        Assessment:   Patient seen for wound care follow-up.  She is dependent for all cares and repositioning.  ATR positioning system in use.  Corey catheter in place, incontinent of stool at times.  Bilateral heels intact with preventative foam dressings in place and elevated off of bed.  Patient with bilateral lower extremity external rotation. Skin is generally thin, dry, flaky, very fragile. Nutrition team following, dietary supplements ordered.  MASD/intertriginous dermatitis to bilateral lateral abdominal folds/pelvis, left breast fold/chest, and right medial thigh--all areas pink, partial thickness, no drainage.  Measurements stable or improved.  Maia-wound areas intact.  No evidence of fungal component at this time.  Right arm/antecubital--skin tear versus moisture damage/intertriginous dermatitis. Pink, partial thickness with no drainage.  Measuring smaller.  Maia-wound intact with hyperpigmentation.  MASD to sacrum--linear, pink, partial thickness open area along gluteal cleft.  No drainage.  Maia-wound intact with hypopigmentation.  Present on admission wound of mixed etiology to left proximal posterior thigh--likely related to pressure (deep tissue pressure injury) and moisture damage with scattered pink/beefy red, partial thickness open areas and purple, intact, non-blanchable areas.  Small serosanguinous drainage. Maia-wound intact with hypo and hyperpigmented scar tissue.  No drainage or induration.    See flowsheet for wound details.    Wound Care Plan:   1-Apply silicone bordered foam dressings to bilateral heels for prevention.  Moo with PRylee  Peel back for skin assessments at least daily and re-apply.  Change dressings every 3 days and as needed.  2-Elevate heels off of bed/chair surface to offload pressure.  3-Offloading air cushion in chair when out of bed, if able.  4-Apply  moisturizing skin cream to body twice daily and as needed.  5-Turn/reposition every 2 hours while in bed and weight shift frequently while in chair for pressure re-distribution on skin.   6-Buttock/sacrum/posterior thighs--cleanse with soap and water, pat dry.  Apply Zinc Oxide/Calazime paste three times daily and as needed with incontinence care.  7-Bilateral breast, mid abdomen, and right medial thigh creases--cleanse with soap and water, pat dry.  Apply Interdry in single layer to skin fold, ensure approximately 2 inches of fabric is visible outside of fold.  Remove Interdry daily for bathing and re-apply.  Change Interdry sheet every other day or if visibly soiled.  DO NOT USE CREAMS OR POWDERS IN AREAS WHERE INTERDRY IS IN USE.  8-Right antecubital wound and lateral abdominal fold wounds--cleanse, pat dry.  Apply non-adherent oil emulsion dressing and silicone bordered foam dressing for treatment.  Change dressings every other day and as needed.    Wound care team to follow.  Plan of care reviewed with primary RN.    Wound 07/21/24 MASD Pelvis Anterior;Left (Active)   Wound Image   07/26/24 1024   Wound Description Pink 07/26/24 1024   Maia-wound Assessment Intact 07/26/24 1024   Wound Length (cm) 0.5 cm 07/26/24 1024   Wound Width (cm) 2.5 cm 07/26/24 1024   Wound Depth (cm) 0.1 cm 07/26/24 1024   Wound Surface Area (cm^2) 1.25 cm^2 07/26/24 1024   Wound Volume (cm^3) 0.125 cm^3 07/26/24 1024   Calculated Wound Volume (cm^3) 0.13 cm^3 07/26/24 1024   Change in Wound Size % 71.11 07/26/24 1024   Drainage Amount None 07/26/24 1024   Non-staged Wound Description Partial thickness 07/26/24 1024   Treatments Cleansed 07/26/24 1024   Dressing Foam, Silicon (eg. Allevyn, etc) 07/26/24 1024   Dressing Changed Changed 07/26/24 1024   Patient Tolerance Tolerated well 07/26/24 1024   Dressing Status Clean;Dry;Intact 07/26/24 1024       Wound 07/21/24 Skin tear Arm Anterior;Right (Active)   Wound Image   07/26/24 1011    Wound Description [place] 07/26/24 1011   Maia-wound Assessment Intact;Hyperpigmented 07/26/24 1011   Wound Length (cm) 0.3 cm 07/26/24 1011   Wound Width (cm) 0.4 cm 07/26/24 1011   Wound Depth (cm) 0.2 cm 07/26/24 1011   Wound Surface Area (cm^2) 0.12 cm^2 07/26/24 1011   Wound Volume (cm^3) 0.024 cm^3 07/26/24 1011   Calculated Wound Volume (cm^3) 0.02 cm^3 07/26/24 1011   Change in Wound Size % 92.31 07/26/24 1011   Drainage Amount None 07/26/24 1011   Non-staged Wound Description Partial thickness 07/26/24 1011   Treatments Cleansed 07/26/24 1011   Dressing Foam, Silicon (eg. Allevyn, etc) 07/26/24 1011   Dressing Changed Changed 07/26/24 1011   Patient Tolerance Tolerated well 07/26/24 1011   Dressing Status Dry;Clean;Intact 07/26/24 1011       Wound 07/21/24 MASD Sacrum Mid (Active)   Wound Image   07/26/24 1019   Wound Description Pink 07/26/24 1019   Pressure Injury Stage 1 07/24/24 2015   Maia-wound Assessment Intact 07/26/24 1019   Wound Length (cm) 3 cm 07/26/24 1019   Wound Width (cm) 0.3 cm 07/26/24 1019   Wound Depth (cm) 0.1 cm 07/26/24 1019   Wound Surface Area (cm^2) 0.9 cm^2 07/26/24 1019   Wound Volume (cm^3) 0.09 cm^3 07/26/24 1019   Calculated Wound Volume (cm^3) 0.09 cm^3 07/26/24 1019   Change in Wound Size % 18.18 07/26/24 1019   Drainage Amount None 07/26/24 1019   Non-staged Wound Description Partial thickness 07/26/24 1019   Treatments Cleansed 07/26/24 1019   Dressing Protective barrier 07/26/24 1019   Patient Tolerance Tolerated well 07/26/24 1019       Wound 07/22/24 MASD Thigh Right;Medial;Distal (Active)   Wound Image   07/26/24 1025   Wound Description Dry;Clean;Intact 07/26/24 1025   Maia-wound Assessment Intact 07/26/24 1025   Wound Length (cm) 0 cm 07/26/24 1025   Wound Width (cm) 0 cm 07/26/24 1025   Wound Depth (cm) 0 cm 07/26/24 1025   Wound Surface Area (cm^2) 0 cm^2 07/26/24 1025   Wound Volume (cm^3) 0 cm^3 07/26/24 1025   Calculated Wound Volume (cm^3) 0 cm^3 07/26/24 1025    Change in Wound Size % 100 07/26/24 1025   Drainage Amount None 07/26/24 1025   Dressing Open to air 07/26/24 1025       Wound 07/22/24 MASD Thigh Proximal;Right;Medial (Active)   Wound Image   07/26/24 1016   Wound Description [place] 07/26/24 1016   Maia-wound Assessment Intact 07/26/24 1016   Wound Length (cm) 0.3 cm 07/26/24 1016   Wound Width (cm) 3.3 cm 07/26/24 1016   Wound Depth (cm) 0.1 cm 07/26/24 1016   Wound Surface Area (cm^2) 0.99 cm^2 07/26/24 1016   Wound Volume (cm^3) 0.099 cm^3 07/26/24 1016   Calculated Wound Volume (cm^3) 0.1 cm^3 07/26/24 1016   Change in Wound Size % 66.67 07/26/24 1016   Drainage Amount None 07/26/24 1016   Non-staged Wound Description Partial thickness 07/26/24 1016   Treatments Cleansed 07/26/24 1016   Dressing Other (Comment) 07/26/24 1016   Patient Tolerance Tolerated well 07/26/24 1016   Dressing Status Clean;Dry;Intact 07/26/24 1016       Wound 07/22/24 MASD Chest Left (Active)   Wound Image   07/26/24 1023   Wound Description [place] 07/26/24 1023   Maia-wound Assessment Intact;Hyperpigmented 07/26/24 1023   Wound Length (cm) 0.7 cm 07/26/24 1023   Wound Width (cm) 6 cm 07/26/24 1023   Wound Depth (cm) 0.1 cm 07/26/24 1023   Wound Surface Area (cm^2) 4.2 cm^2 07/26/24 1023   Wound Volume (cm^3) 0.42 cm^3 07/26/24 1023   Calculated Wound Volume (cm^3) 0.42 cm^3 07/26/24 1023   Change in Wound Size % 96.47 07/26/24 1023   Drainage Amount None 07/26/24 1023   Non-staged Wound Description Partial thickness 07/26/24 1023   Treatments Cleansed 07/26/24 1023   Dressing Other (Comment) 07/26/24 1023   Patient Tolerance Tolerated well 07/26/24 1023   Dressing Status Clean;Dry;Intact 07/26/24 1023       Wound 07/22/24 MASD Thigh Left;Posterior (Active)   Wound Image   07/26/24 1018   Wound Description Beefy red 07/26/24 1018   Maia-wound Assessment Scar Tissue;Pink;Hyperpigmented 07/26/24 1018   Wound Length (cm) 5 cm 07/26/24 1018   Wound Width (cm) 2 cm 07/26/24 1018   Wound Depth  (cm) 0.1 cm 07/26/24 1018   Wound Surface Area (cm^2) 10 cm^2 07/26/24 1018   Wound Volume (cm^3) 1 cm^3 07/26/24 1018   Calculated Wound Volume (cm^3) 1 cm^3 07/26/24 1018   Change in Wound Size % 69.23 07/26/24 1018   Drainage Amount Small 07/26/24 1018   Drainage Description Serosanguineous 07/26/24 1018   Non-staged Wound Description Partial thickness 07/26/24 1018   Treatments Cleansed 07/26/24 1018   Dressing Protective barrier 07/26/24 1018   Patient Tolerance Tolerated well 07/26/24 1018       Naima Ribera BSN, RN, CWON

## 2024-07-26 NOTE — SPEECH THERAPY NOTE
Speech Language/Pathology    Speech/Language Pathology Progress Note    Patient Name: Bhavna Al  Today's Date: 2024     Problem List  Principal Problem:    Sepsis (HCC)  Active Problems:    Thrombocytopenia (HCC)    Rheumatoid arthritis (HCC)    Morbid obesity (HCC)    Shock (HCC)    Acute on chronic diastolic heart failure, LVEF 65%, LVIDd 3.3 cm, AHA Stage C    Ambulatory dysfunction    Deep tissue injury    MRSA bacteremia    Hypotension    Persistent atrial fibrillation    Acute kidney injury superimposed on chronic kidney disease  (HCC)    Hypokalemia    Chronic pruritic rash in adult       Past Medical History  Past Medical History:   Diagnosis Date    Abnormal thyroid function test     last assessed: 2015     Arthritis     Caries     last assessed: 2016     Continuous opioid dependence (HCC) 2021    Edema of right lower extremity     last assessed: 2015     GERD (gastroesophageal reflux disease)     Hypertension     Medicare annual wellness visit, subsequent 2021    Positive blood culture 3/11/2022    Sarcoid         Past Surgical History  Past Surgical History:   Procedure Laterality Date     SECTION      IR NON-TUNNELED CENTRAL LINE PLACEMENT  2024    IR PICC PLACEMENT SINGLE LUMEN  2024    IR PICC PLACEMENT SINGLE LUMEN  2024    MULTIPLE TOOTH EXTRACTIONS N/A 2016    Procedure: Surgical extraction of teeth 2, 18, 19, 30, 31; incision and drainage of left subperiosteal abscess ;  Surgeon: Clara Cevallos DMD;  Location: [facility];  Service:          Subjective:  Pt was positioned upright and lethargic. Alerted to stim.   Objective:  Pt was seen for f/u dysphagia therapy at lunch. New to this ST. Spoke with nursing reported increased difficulty with swallowing this am as prolonged transfer and increased lethargy.  Pt fed by [facility] soft chicken, mashed potatoes, and thin liquids via straw with single/successive sips.  Mastication  was minimal. Vertical chew.  Bolus control and formation were WNL. Transfer was reduced with chicken as oral residue base of tongue. Cleared with min mashed potatoes provided. As session progressed prolonged transfer and swallow initiation. D/c feed as increased lethargy.     Assessment:  Increased lethargy, limited by mental status.. Decreased transfer with mech soft consistencies. Prolonged transfer and swallow initiation as session progressed. Min intake    Plan/Recommendations:  Recommend Dysphagia 1 and thin liquids  Ensure good oral care  Aspiration precautions   ST continue to f/u as indicated.

## 2024-07-26 NOTE — ASSESSMENT & PLAN NOTE
Nonischemic troponin elevation secondary to sepsis, shock, acute kidney injury  Cardiology input appreciated

## 2024-07-26 NOTE — CASE MANAGEMENT
Case Management Discharge Planning Note    Patient name Bhavna Al  Location [facility] 2 M* MRN [id number]  : 1950 Date 2024       Current Admission Date: 2024  Current Admission Diagnosis:Sepsis ([facility])   Patient Active Problem List    Diagnosis Date Noted Date Diagnosed    Sepsis ([facility]) 2024     Acute kidney injury superimposed on chronic kidney disease  ([facility]) 2024     Hypokalemia 2024     Chronic pruritic rash in adult 2024     Secondary adrenal insufficiency ([facility]) 2024     Pressure ulcer of left buttock, stage 3 ([facility]) 2024     Acute osteomyelitis of thoracic spine ([facility]) 2024     MRSA bacteremia 2024     Hypotension 2024     Dysphagia 2024     Deep tissue injury 2024     Hypernatremia 2024     Localized swelling on left hand 2023     Stage 3 chronic kidney disease ([facility]) 2023     Febrile illness 2023     Dermatitis associated with incontinence 2023     Right shoulder pain 12/15/2022     Abnormal urinalysis 2022     Ambulatory dysfunction 2022     Hyperuricemia 2022     Acute metabolic encephalopathy 2022     Acute on chronic diastolic heart failure, LVEF 65%, LVIDd 3.3 cm, AHA Stage C 2022     Acute bronchitis 2022     Assistance needed with transportation 09/15/2022     Abnormal CT of the chest 2022     Gram-positive cocci bacteremia 2022     Psoriasis 2022     COVID-19 2022     Shock ([facility]) 01/10/2022     Persistent atrial fibrillation 01/10/2022     Hyperparathyroidism ([facility]) 2021     Murmur, cardiac 2021     BPPV (benign paroxysmal positional vertigo) 2018     Seasonal allergic rhinitis due to pollen 2018     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     Osteoporosis 2016     Leukopenia 2016     Thrombocytopenia ([facility]) 2016     Rheumatoid arthritis ([facility]) 2016     GERD  (gastroesophageal reflux disease) 08/23/2016     Sarcoid 08/23/2016     Morbid obesity (HCC) 07/12/2016     Vitamin D deficiency 07/16/2015     Essential hypertension 12/04/2014     Lumbar radiculopathy 12/04/2014       LOS (days): 5  Geometric Mean LOS (GMLOS) (days): 5.1  Days to GMLOS:0.2     OBJECTIVE:  Risk of Unplanned Readmission Score: 37.24         Current admission status: Inpatient   Preferred Pharmacy:   [id number] - [place], [place] - [address]  [address]  [place] [address]  Phone: [phone] Fax: [phone]    [facility] - [address] [address]  Phone: [phone] Fax: [phone]    Primary Care Provider: Nya Taveras DO    Primary Insurance: MEDICARE  Secondary Insurance: [facility]    DISCHARGE DETAILS:                                                    Treatment Team Recommendation: SNF  Discharge Destination Plan:: SNF ([facility]-15 day MA bed hold)

## 2024-07-26 NOTE — ASSESSMENT & PLAN NOTE
Hematology input appreciated  Likely HIT mild DIC ruled out, direct Vasquez test negative  Platelet counts improving, monitor

## 2024-07-26 NOTE — PLAN OF CARE
Problem: Prexisting or High Potential for Compromised Skin Integrity  Goal: Skin integrity is maintained or improved  Description: INTERVENTIONS:  - Identify patients at risk for skin breakdown  - Assess and monitor skin integrity  - Assess and monitor nutrition and hydration status  - Monitor labs   - Assess for incontinence   - Turn and reposition patient  - Assist with mobility/ambulation  - Relieve pressure over bony prominences  - Avoid friction and shearing  - Provide appropriate hygiene as needed including keeping skin clean and dry  - Evaluate need for skin moisturizer/barrier cream  - Collaborate with interdisciplinary team   - Patient/family teaching  - Consider wound care consult   Outcome: Progressing     Problem: PAIN - ADULT  Goal: Verbalizes/displays adequate comfort level or baseline comfort level  Description: Interventions:  - Encourage patient to monitor pain and request assistance  - Assess pain using appropriate pain scale  - Administer analgesics based on type and severity of pain and evaluate response  - Implement non-pharmacological measures as appropriate and evaluate response  - Consider cultural and social influences on pain and pain management  - Notify physician/advanced practitioner if interventions unsuccessful or patient reports new pain  Outcome: Progressing     Problem: INFECTION - ADULT  Goal: Absence or prevention of progression during hospitalization  Description: INTERVENTIONS:  - Assess and monitor for signs and symptoms of infection  - Monitor lab/diagnostic results  - Monitor all insertion sites, i.e. indwelling lines, tubes, and drains  - Monitor endotracheal if appropriate and nasal secretions for changes in amount and color  - [place] appropriate cooling/warming therapies per order  - Administer medications as ordered  - Instruct and encourage patient and family to use good hand hygiene technique  - Identify and instruct in appropriate isolation precautions for  identified infection/condition  Outcome: Progressing     Problem: SAFETY ADULT  Goal: Patient will remain free of falls  Description: INTERVENTIONS:  - Educate patient/family on patient safety including physical limitations  - Instruct patient to call for assistance with activity   - Consult OT/PT to assist with strengthening/mobility   - Keep Call bell within reach  - Keep bed low and locked with side rails adjusted as appropriate  - Keep care items and personal belongings within reach  - Initiate and maintain comfort rounds  - Make Fall Risk Sign visible to staff  - Offer Toileting every 2 Hours, in advance of need  - Initiate/Maintain bed alarm  - Obtain necessary fall risk management equipment:   - Apply yellow socks and bracelet for high fall risk patients  - Consider moving patient to room near nurses station  Outcome: Progressing  Goal: Maintain or return to baseline ADL function  Description: INTERVENTIONS:  -  Assess patient's ability to carry out ADLs; assess patient's baseline for ADL function and identify physical deficits which impact ability to perform ADLs (bathing, care of mouth/teeth, toileting, grooming, dressing, etc.)  - Assess/evaluate cause of self-care deficits   - Assess range of motion  - Assess patient's mobility; develop plan if impaired  - Assess patient's need for assistive devices and provide as appropriate  - Encourage maximum independence but intervene and supervise when necessary  - Involve family in performance of ADLs  - Assess for home care needs following discharge   - Consider OT consult to assist with ADL evaluation and planning for discharge  - Provide patient education as appropriate  Outcome: Progressing  Goal: Maintains/Returns to pre admission functional level  Description: INTERVENTIONS:  - Perform AM-PAC 6 Click Basic Mobility/ Daily Activity assessment daily.  - Set and communicate daily mobility goal to care team and patient/family/caregiver.   - Collaborate with  rehabilitation services on mobility goals if consulted  - Perform Range of Motion 3 times a day.  - Reposition patient every 2 hours.  - Dangle patient 3 times a day  - Stand patient 3 times a day  - Ambulate patient 3 times a day  - Out of bed to chair 3 times a day   - Out of bed for meals 3 times a day  - Out of bed for toileting  - Record patient progress and toleration of activity level   Outcome: Progressing     Problem: DISCHARGE PLANNING  Goal: Discharge to home or other facility with appropriate resources  Description: INTERVENTIONS:  - Identify barriers to discharge w/patient and caregiver  - Arrange for needed discharge resources and transportation as appropriate  - Identify discharge learning needs (meds, wound care, etc.)  - Arrange for interpretive services to assist at discharge as needed  - Refer to Case Management Department for coordinating discharge planning if the patient needs post-hospital services based on physician/advanced practitioner order or complex needs related to functional status, cognitive ability, or social support system  Outcome: Progressing     Problem: CARDIOVASCULAR - ADULT  Goal: Maintains optimal cardiac output and hemodynamic stability  Description: INTERVENTIONS:  - Monitor I/O, vital signs and rhythm  - Monitor for S/S and trends of decreased cardiac output  - Administer and titrate ordered vasoactive medications to optimize hemodynamic stability  - Assess quality of pulses, skin color and temperature  - Assess for signs of decreased coronary artery perfusion  - Instruct patient to report change in severity of symptoms  Outcome: Progressing  Goal: Absence of cardiac dysrhythmias or at baseline rhythm  Description: INTERVENTIONS:  - Continuous cardiac monitoring, vital signs, obtain 12 lead EKG if ordered  - Administer antiarrhythmic and heart rate control medications as ordered  - Monitor electrolytes and administer replacement therapy as ordered  Outcome: Progressing      Problem: RESPIRATORY - ADULT  Goal: Achieves optimal ventilation and oxygenation  Description: INTERVENTIONS:  - Assess for changes in respiratory status  - Assess for changes in mentation and behavior  - Position to facilitate oxygenation and minimize respiratory effort  - Oxygen administered by appropriate delivery if ordered  - Initiate smoking cessation education as indicated  - Encourage broncho-pulmonary hygiene including cough, deep breathe, Incentive Spirometry  - Assess the need for suctioning and aspirate as needed  - Assess and instruct to report SOB or any respiratory difficulty  - Respiratory Therapy support as indicated  Outcome: Progressing     Problem: METABOLIC, FLUID AND ELECTROLYTES - ADULT  Goal: Electrolytes maintained within normal limits  Description: INTERVENTIONS:  - Monitor labs and assess patient for signs and symptoms of electrolyte imbalances  - Administer electrolyte replacement as ordered  - Monitor response to electrolyte replacements, including repeat lab results as appropriate  - Instruct patient on fluid and nutrition as appropriate  Outcome: Progressing  Goal: Fluid balance maintained  Description: INTERVENTIONS:  - Monitor labs   - Monitor I/O and WT  - Instruct patient on fluid and nutrition as appropriate  - Assess for signs & symptoms of volume excess or deficit  Outcome: Progressing  Goal: Glucose maintained within target range  Description: INTERVENTIONS:  - Monitor Blood Glucose as ordered  - Assess for signs and symptoms of hyperglycemia and hypoglycemia  - Administer ordered medications to maintain glucose within target range  - Assess nutritional intake and initiate nutrition service referral as needed  Outcome: Progressing     Problem: HEMATOLOGIC - ADULT  Goal: Maintains hematologic stability  Description: INTERVENTIONS  - Assess for signs and symptoms of bleeding or hemorrhage  - Monitor labs  - Administer supportive blood products/factors as ordered and  appropriate  Outcome: Progressing     Problem: Nutrition/Hydration-ADULT  Goal: Nutrient/Hydration intake appropriate for improving, restoring or maintaining nutritional needs  Description: Monitor and assess patient's nutrition/hydration status for malnutrition. Collaborate with interdisciplinary team and initiate plan and interventions as ordered.  Monitor patient's weight and dietary intake as ordered or per policy. Utilize nutrition screening tool and intervene as necessary. Determine patient's food preferences and provide high-protein, high-caloric foods as appropriate.     INTERVENTIONS:  - Monitor oral intake, urinary output, labs, and treatment plans  - Assess nutrition and hydration status and recommend course of action  - Evaluate amount of meals eaten  - Assist patient with eating if necessary   - Allow adequate time for meals  - Recommend/ encourage appropriate diets, oral nutritional supplements, and vitamin/mineral supplements  - Order, calculate, and assess calorie counts as needed  - Recommend, monitor, and adjust tube feedings and TPN/PPN based on assessed needs  - Assess need for intravenous fluids  - Provide specific nutrition/hydration education as appropriate  - Include patient/family/caregiver in decisions related to nutrition  Outcome: Progressing

## 2024-07-26 NOTE — PLAN OF CARE
Problem: Prexisting or High Potential for Compromised Skin Integrity  Goal: Skin integrity is maintained or improved  Description: INTERVENTIONS:  - Identify patients at risk for skin breakdown  - Assess and monitor skin integrity  - Assess and monitor nutrition and hydration status  - Monitor labs   - Assess for incontinence   - Turn and reposition patient  - Assist with mobility/ambulation  - Relieve pressure over bony prominences  - Avoid friction and shearing  - Provide appropriate hygiene as needed including keeping skin clean and dry  - Evaluate need for skin moisturizer/barrier cream  - Collaborate with interdisciplinary team   - Patient/family teaching  - Consider wound care consult   Outcome: Progressing     Problem: PAIN - ADULT  Goal: Verbalizes/displays adequate comfort level or baseline comfort level  Description: Interventions:  - Encourage patient to monitor pain and request assistance  - Assess pain using appropriate pain scale  - Administer analgesics based on type and severity of pain and evaluate response  - Implement non-pharmacological measures as appropriate and evaluate response  - Consider cultural and social influences on pain and pain management  - Notify physician/advanced practitioner if interventions unsuccessful or patient reports new pain  Outcome: Progressing     Problem: INFECTION - ADULT  Goal: Absence or prevention of progression during hospitalization  Description: INTERVENTIONS:  - Assess and monitor for signs and symptoms of infection  - Monitor lab/diagnostic results  - Monitor all insertion sites, i.e. indwelling lines, tubes, and drains  - Monitor endotracheal if appropriate and nasal secretions for changes in amount and color  - [place] appropriate cooling/warming therapies per order  - Administer medications as ordered  - Instruct and encourage patient and family to use good hand hygiene technique  - Identify and instruct in appropriate isolation precautions for  identified infection/condition  Outcome: Progressing     Problem: SAFETY ADULT  Goal: Patient will remain free of falls  Description: INTERVENTIONS:  - Educate patient/family on patient safety including physical limitations  - Instruct patient to call for assistance with activity   - Consult OT/PT to assist with strengthening/mobility   - Keep Call bell within reach  - Keep bed low and locked with side rails adjusted as appropriate  - Keep care items and personal belongings within reach  - Initiate and maintain comfort rounds  - Make Fall Risk Sign visible to staff  - Offer Toileting every 2 Hours, in advance of need  - Initiate/Maintain bed alarm  - Obtain necessary fall risk management equipment: bed alarm  - Apply yellow socks and bracelet for high fall risk patients  - Consider moving patient to room near nurses station  Outcome: Progressing  Goal: Maintain or return to baseline ADL function  Description: INTERVENTIONS:  -  Assess patient's ability to carry out ADLs; assess patient's baseline for ADL function and identify physical deficits which impact ability to perform ADLs (bathing, care of mouth/teeth, toileting, grooming, dressing, etc.)  - Assess/evaluate cause of self-care deficits   - Assess range of motion  - Assess patient's mobility; develop plan if impaired  - Assess patient's need for assistive devices and provide as appropriate  - Encourage maximum independence but intervene and supervise when necessary  - Involve family in performance of ADLs  - Assess for home care needs following discharge   - Consider OT consult to assist with ADL evaluation and planning for discharge  - Provide patient education as appropriate  Outcome: Progressing  Goal: Maintains/Returns to pre admission functional level  Description: INTERVENTIONS:  - Perform AM-PAC 6 Click Basic Mobility/ Daily Activity assessment daily.  - Set and communicate daily mobility goal to care team and patient/family/caregiver.   - Collaborate  with rehabilitation services on mobility goals if consulted  - Perform Range of Motion 4 times a day.  - Reposition patient every 2 hours.  - Dangle patient 3 times a day  - Stand patient 3 times a day  - Ambulate patient 3 times a day  - Out of bed to chair 3 times a day   - Out of bed for meals 3 times a day  - Out of bed for toileting  - Record patient progress and toleration of activity level   Outcome: Progressing     Problem: DISCHARGE PLANNING  Goal: Discharge to home or other facility with appropriate resources  Description: INTERVENTIONS:  - Identify barriers to discharge w/patient and caregiver  - Arrange for needed discharge resources and transportation as appropriate  - Identify discharge learning needs (meds, wound care, etc.)  - Arrange for interpretive services to assist at discharge as needed  - Refer to Case Management Department for coordinating discharge planning if the patient needs post-hospital services based on physician/advanced practitioner order or complex needs related to functional status, cognitive ability, or social support system  Outcome: Progressing     Problem: Knowledge Deficit  Goal: Patient/family/caregiver demonstrates understanding of disease process, treatment plan, medications, and discharge instructions  Description: Complete learning assessment and assess knowledge base.  Interventions:  - Provide teaching at level of understanding  - Provide teaching via preferred learning methods  Outcome: Progressing     Problem: CARDIOVASCULAR - ADULT  Goal: Maintains optimal cardiac output and hemodynamic stability  Description: INTERVENTIONS:  - Monitor I/O, vital signs and rhythm  - Monitor for S/S and trends of decreased cardiac output  - Administer and titrate ordered vasoactive medications to optimize hemodynamic stability  - Assess quality of pulses, skin color and temperature  - Assess for signs of decreased coronary artery perfusion  - Instruct patient to report change in  severity of symptoms  Outcome: Progressing  Goal: Absence of cardiac dysrhythmias or at baseline rhythm  Description: INTERVENTIONS:  - Continuous cardiac monitoring, vital signs, obtain 12 lead EKG if ordered  - Administer antiarrhythmic and heart rate control medications as ordered  - Monitor electrolytes and administer replacement therapy as ordered  Outcome: Progressing     Problem: RESPIRATORY - ADULT  Goal: Achieves optimal ventilation and oxygenation  Description: INTERVENTIONS:  - Assess for changes in respiratory status  - Assess for changes in mentation and behavior  - Position to facilitate oxygenation and minimize respiratory effort  - Oxygen administered by appropriate delivery if ordered  - Initiate smoking cessation education as indicated  - Encourage broncho-pulmonary hygiene including cough, deep breathe, Incentive Spirometry  - Assess the need for suctioning and aspirate as needed  - Assess and instruct to report SOB or any respiratory difficulty  - Respiratory Therapy support as indicated  Outcome: Progressing     Problem: METABOLIC, FLUID AND ELECTROLYTES - ADULT  Goal: Electrolytes maintained within normal limits  Description: INTERVENTIONS:  - Monitor labs and assess patient for signs and symptoms of electrolyte imbalances  - Administer electrolyte replacement as ordered  - Monitor response to electrolyte replacements, including repeat lab results as appropriate  - Instruct patient on fluid and nutrition as appropriate  Outcome: Progressing  Goal: Fluid balance maintained  Description: INTERVENTIONS:  - Monitor labs   - Monitor I/O and WT  - Instruct patient on fluid and nutrition as appropriate  - Assess for signs & symptoms of volume excess or deficit  Outcome: Progressing  Goal: Glucose maintained within target range  Description: INTERVENTIONS:  - Monitor Blood Glucose as ordered  - Assess for signs and symptoms of hyperglycemia and hypoglycemia  - Administer ordered medications to maintain  glucose within target range  - Assess nutritional intake and initiate nutrition service referral as needed  Outcome: Progressing     Problem: HEMATOLOGIC - ADULT  Goal: Maintains hematologic stability  Description: INTERVENTIONS  - Assess for signs and symptoms of bleeding or hemorrhage  - Monitor labs  - Administer supportive blood products/factors as ordered and appropriate  Outcome: Progressing     Problem: Nutrition/Hydration-ADULT  Goal: Nutrient/Hydration intake appropriate for improving, restoring or maintaining nutritional needs  Description: Monitor and assess patient's nutrition/hydration status for malnutrition. Collaborate with interdisciplinary team and initiate plan and interventions as ordered.  Monitor patient's weight and dietary intake as ordered or per policy. Utilize nutrition screening tool and intervene as necessary. Determine patient's food preferences and provide high-protein, high-caloric foods as appropriate.     INTERVENTIONS:  - Monitor oral intake, urinary output, labs, and treatment plans  - Assess nutrition and hydration status and recommend course of action  - Evaluate amount of meals eaten  - Assist patient with eating if necessary   - Allow adequate time for meals  - Recommend/ encourage appropriate diets, oral nutritional supplements, and vitamin/mineral supplements  - Order, calculate, and assess calorie counts as needed  - Recommend, monitor, and adjust tube feedings and TPN/PPN based on assessed needs  - Assess need for intravenous fluids  - Provide specific nutrition/hydration education as appropriate  - Include patient/family/caregiver in decisions related to nutrition  Outcome: Progressing

## 2024-07-26 NOTE — PLAN OF CARE
Problem: Prexisting or High Potential for Compromised Skin Integrity  Goal: Skin integrity is maintained or improved  Description: INTERVENTIONS:  - Identify patients at risk for skin breakdown  - Assess and monitor skin integrity  - Assess and monitor nutrition and hydration status  - Monitor labs   - Assess for incontinence   - Turn and reposition patient  - Assist with mobility/ambulation  - Relieve pressure over bony prominences  - Avoid friction and shearing  - Provide appropriate hygiene as needed including keeping skin clean and dry  - Evaluate need for skin moisturizer/barrier cream  - Collaborate with interdisciplinary team   - Patient/family teaching  - Consider wound care consult   Outcome: Progressing     Problem: PAIN - ADULT  Goal: Verbalizes/displays adequate comfort level or baseline comfort level  Description: Interventions:  - Encourage patient to monitor pain and request assistance  - Assess pain using appropriate pain scale  - Administer analgesics based on type and severity of pain and evaluate response  - Implement non-pharmacological measures as appropriate and evaluate response  - Consider cultural and social influences on pain and pain management  - Notify physician/advanced practitioner if interventions unsuccessful or patient reports new pain  Outcome: Progressing     Problem: INFECTION - ADULT  Goal: Absence or prevention of progression during hospitalization  Description: INTERVENTIONS:  - Assess and monitor for signs and symptoms of infection  - Monitor lab/diagnostic results  - Monitor all insertion sites, i.e. indwelling lines, tubes, and drains  - Monitor endotracheal if appropriate and nasal secretions for changes in amount and color  - [place] appropriate cooling/warming therapies per order  - Administer medications as ordered  - Instruct and encourage patient and family to use good hand hygiene technique  - Identify and instruct in appropriate isolation precautions for  identified infection/condition  Outcome: Progressing     Problem: SAFETY ADULT  Goal: Patient will remain free of falls  Description: INTERVENTIONS:  - Educate patient/family on patient safety including physical limitations  - Instruct patient to call for assistance with activity   - Consult OT/PT to assist with strengthening/mobility   - Keep Call bell within reach  - Keep bed low and locked with side rails adjusted as appropriate  - Keep care items and personal belongings within reach  - Initiate and maintain comfort rounds  - Apply yellow socks and bracelet for high fall risk patients  - Consider moving patient to room near nurses station  Outcome: Progressing  Goal: Maintain or return to baseline ADL function  Description: INTERVENTIONS:  -  Assess patient's ability to carry out ADLs; assess patient's baseline for ADL function and identify physical deficits which impact ability to perform ADLs (bathing, care of mouth/teeth, toileting, grooming, dressing, etc.)  - Assess/evaluate cause of self-care deficits   - Assess range of motion  - Assess patient's mobility; develop plan if impaired  - Assess patient's need for assistive devices and provide as appropriate  - Encourage maximum independence but intervene and supervise when necessary  - Involve family in performance of ADLs  - Assess for home care needs following discharge   - Consider OT consult to assist with ADL evaluation and planning for discharge  - Provide patient education as appropriate  Outcome: Progressing  Goal: Maintains/Returns to pre admission functional level  Description: INTERVENTIONS:  - Perform AM-PAC 6 Click Basic Mobility/ Daily Activity assessment daily.  - Set and communicate daily mobility goal to care team and patient/family/caregiver.   - Collaborate with rehabilitation services on mobility goals if consulted  - Out of bed for toileting  - Record patient progress and toleration of activity level   Outcome: Progressing     Problem:  DISCHARGE PLANNING  Goal: Discharge to home or other facility with appropriate resources  Description: INTERVENTIONS:  - Identify barriers to discharge w/patient and caregiver  - Arrange for needed discharge resources and transportation as appropriate  - Identify discharge learning needs (meds, wound care, etc.)  - Arrange for interpretive services to assist at discharge as needed  - Refer to Case Management Department for coordinating discharge planning if the patient needs post-hospital services based on physician/advanced practitioner order or complex needs related to functional status, cognitive ability, or social support system  Outcome: Progressing     Problem: Knowledge Deficit  Goal: Patient/family/caregiver demonstrates understanding of disease process, treatment plan, medications, and discharge instructions  Description: Complete learning assessment and assess knowledge base.  Interventions:  - Provide teaching at level of understanding  - Provide teaching via preferred learning methods  Outcome: Progressing     Problem: CARDIOVASCULAR - ADULT  Goal: Maintains optimal cardiac output and hemodynamic stability  Description: INTERVENTIONS:  - Monitor I/O, vital signs and rhythm  - Monitor for S/S and trends of decreased cardiac output  - Administer and titrate ordered vasoactive medications to optimize hemodynamic stability  - Assess quality of pulses, skin color and temperature  - Assess for signs of decreased coronary artery perfusion  - Instruct patient to report change in severity of symptoms  Outcome: Progressing  Goal: Absence of cardiac dysrhythmias or at baseline rhythm  Description: INTERVENTIONS:  - Continuous cardiac monitoring, vital signs, obtain 12 lead EKG if ordered  - Administer antiarrhythmic and heart rate control medications as ordered  - Monitor electrolytes and administer replacement therapy as ordered  Outcome: Progressing     Problem: RESPIRATORY - ADULT  Goal: Achieves optimal  ventilation and oxygenation  Description: INTERVENTIONS:  - Assess for changes in respiratory status  - Assess for changes in mentation and behavior  - Position to facilitate oxygenation and minimize respiratory effort  - Oxygen administered by appropriate delivery if ordered  - Initiate smoking cessation education as indicated  - Encourage broncho-pulmonary hygiene including cough, deep breathe, Incentive Spirometry  - Assess the need for suctioning and aspirate as needed  - Assess and instruct to report SOB or any respiratory difficulty  - Respiratory Therapy support as indicated  Outcome: Progressing     Problem: METABOLIC, FLUID AND ELECTROLYTES - ADULT  Goal: Electrolytes maintained within normal limits  Description: INTERVENTIONS:  - Monitor labs and assess patient for signs and symptoms of electrolyte imbalances  - Administer electrolyte replacement as ordered  - Monitor response to electrolyte replacements, including repeat lab results as appropriate  - Instruct patient on fluid and nutrition as appropriate  Outcome: Progressing  Goal: Fluid balance maintained  Description: INTERVENTIONS:  - Monitor labs   - Monitor I/O and WT  - Instruct patient on fluid and nutrition as appropriate  - Assess for signs & symptoms of volume excess or deficit  Outcome: Progressing  Goal: Glucose maintained within target range  Description: INTERVENTIONS:  - Monitor Blood Glucose as ordered  - Assess for signs and symptoms of hyperglycemia and hypoglycemia  - Administer ordered medications to maintain glucose within target range  - Assess nutritional intake and initiate nutrition service referral as needed  Outcome: Progressing     Problem: HEMATOLOGIC - ADULT  Goal: Maintains hematologic stability  Description: INTERVENTIONS  - Assess for signs and symptoms of bleeding or hemorrhage  - Monitor labs  - Administer supportive blood products/factors as ordered and appropriate  Outcome: Progressing     Problem:  Nutrition/Hydration-ADULT  Goal: Nutrient/Hydration intake appropriate for improving, restoring or maintaining nutritional needs  Description: Monitor and assess patient's nutrition/hydration status for malnutrition. Collaborate with interdisciplinary team and initiate plan and interventions as ordered.  Monitor patient's weight and dietary intake as ordered or per policy. Utilize nutrition screening tool and intervene as necessary. Determine patient's food preferences and provide high-protein, high-caloric foods as appropriate.     INTERVENTIONS:  - Monitor oral intake, urinary output, labs, and treatment plans  - Assess nutrition and hydration status and recommend course of action  - Evaluate amount of meals eaten  - Assist patient with eating if necessary   - Allow adequate time for meals  - Recommend/ encourage appropriate diets, oral nutritional supplements, and vitamin/mineral supplements  - Order, calculate, and assess calorie counts as needed  - Recommend, monitor, and adjust tube feedings and TPN/PPN based on assessed needs  - Assess need for intravenous fluids  - Provide specific nutrition/hydration education as appropriate  - Include patient/family/caregiver in decisions related to nutrition  Outcome: Progressing

## 2024-07-26 NOTE — PROGRESS NOTES
Progress Note - Infectious Disease   Bhavna Al 74 y.o. female MRN: [id number]  Unit/Bed#: [place] [address] -01 Encounter: [id number]    Impression/Plan:  1.  Shock.  No infectious etiology found despite very extensive evaluation clinically, microbiologically, and radiographically.  Cultures were all negative.  Patient is weaned off vasopressor support. Consideration for the possibly adrenal insufficiency on this patient with a chronic low-dose steroids being used. Procalcitonin level was quite high but in the setting of acute kidney injury.  CT chest abdomen pelvis without a definitive source.  She has now weaned off vasopressor support. This morning she is afebrile. She has transitioned out of ICU to med/surg care.  -no indication for antibiotic for this issue  -ongoing oral antibiotic treatment for spinal osteomyelitis as below  -monitor CBCD and BMP  -monitor vitals  -supportive care     2.  MRSA bacteremia.  Undefined primary source but with a chronic rash with some areas of skin breakdown which could be playing a role.  Complicated by thoracic vertebral osteomyelitis as below.  Patient previously cleared her bacteremia and a transthoracic echocardiogram that was without valvular vegetations.  CT of the chest abdomen pelvis without any new complications. Repeat transthoracic echocardiogram without valvular vegetation appreciated.  Patient has now completed appropriate antibiotic for this issue. No indication for ongoing treatment of this issue at this time.  -no indication for ongoing antibiotic for this issue     3. Thoracic vertebral osteomyelitis.  Imaging of the spine in the setting of back pain revealed T9-10 discitis/vertebral osteomyelitis. Likely staphylococcal based on the previous blood culture results.  Sedimentation rate and CRP have been decreasing.  Pain has been improving. The patient has completed IV antibiotic treatment and has transitioned to PO Doxycycline. She is planned to remain on oral  "antibiotic treatment until her sed rate normalizes vs plateaus. Patient is planned for outpatient ID follow up after discharge.  -continue PO Doxycycline, 100mg BID  -anticipate ongoing oral antibiotic treatment until her sed rate normalizes vs plateaus  -CBCD and creatinine every other week while on oral antibiotic  -monitor vitals  -patient to follow up in the outpatient ID office after discharge, appointment information placed in the discharge planning     4. Acute kidney injury. This can impact antibiotic dosing. Most likely prerenal issue.  Consideration for the possibility of ATN.  Modest improvement. Creatinine is 2.34 this morning.  -monitor creatinine  -dose adjust antibiotic for renal function as needed  -avoid nephrotoxins  -volume management per critical care team     5. Rheumatoid arthritis.  On Plaquenil and prednisone. Immunosuppression is risk factor for infection.    Patient is stable for discharge from ID standpoint. If she remains inpatient she will be formally reassessed by the infectious disease team on Monday, 2024. Please call sooner with new questions or concerns.   Above plan was discussed in detail with patient at the bedside.  Above plan was discussed in detail with XENIA who agrees with plan for ongoing oral antibiotic.    Antibiotics:  Doxycycline 2  Antibiotics 6    Subjective:  Patient  nods yes when asked if she's ok. She nods no when I ask about pain in her chest and abdomen. She tells me her breathing is \"fine.\" She has not had any fever, chills, sweats, or shakes. She offers no other symptoms.    Objective:  Vitals:  Temp:  [97.5 °F (36.4 °C)-99.6 °F (37.6 °C)] 99.6 °F (37.6 °C)  HR:  [] 122  Resp:  [19-29] 20  BP: ()/(48-81) 115/81  SpO2:  [96 %-99 %] 97 %  Temp (24hrs), Av.6 °F (37 °C), Min:97.5 °F (36.4 °C), Max:99.6 °F (37.6 °C)  Current: Temperature: 99.6 °F (37.6 °C)    Physical Exam:   General Appearance:  More alert this morniong, somewhat interactive " but nods to answers more than offering verbal response. She appears chronically ill and debilitated. She appears in no acute distress. She appears comfortable sitting up in bed.   Lungs:   Clear to auscultation bilaterally; no wheezes, rhonchi or rales; respirations unlabored on room air.   Heart:  Tachycardic; no murmur, rub or gallop.   Abdomen:   Soft, non-tender, non-distended, positive bowel sounds.     Extremities: No clubbing or cyanosis, + pedal edema.   Skin: Dry skin with scattered flaking rash, patient scratching during my visit.     Labs, Imaging, & Other studies:   All pertinent labs and imaging studies were personally reviewed  Results from last 7 days   Lab Units 07/25/24  0436 07/24/24  1251 07/24/24  0436 07/23/24  0446   WBC Thousand/uL 2.84*  --  4.41 6.05   HEMOGLOBIN g/dL 7.9*  --  7.6* 7.6*   PLATELETS Thousands/uL 78* 81* 89* 105*     Results from last 7 days   Lab Units 07/25/24  1553 07/25/24  0436 07/24/24  1550 07/24/24  0436 07/21/24  1818 07/21/24  0933   POTASSIUM mmol/L 5.0 3.2*   < > 3.1*   < > 2.7*   CHLORIDE mmol/L 112* 112*   < > 110*   < > 109*   CO2 mmol/L 25 25   < > 24   < > 20*   BUN mg/dL 34* 36*   < > 35*   < > 24   CREATININE mg/dL 2.34* 2.52*   < > 2.65*   < > 2.58*   EGFR ml/min/1.73sq m 19 17   < > 16   < > 17   CALCIUM mg/dL 9.3 9.0   < > 8.6   < > 8.7   AST U/L  --  41*  --  39  --  27   ALT U/L  --  37  --  30  --  23   ALK PHOS U/L  --  46  --  43  --  46    < > = values in this interval not displayed.     Results from last 7 days   Lab Units 07/22/24  1025 07/21/24  0933   BLOOD CULTURE   --  No Growth After 4 Days.  No Growth After 4 Days.   URINE CULTURE  No Growth <1000 cfu/mL  --      Results from last 7 days   Lab Units 07/22/24  0426 07/21/24  0933   PROCALCITONIN ng/ml 36.36* 40.79*

## 2024-07-26 NOTE — CASE MANAGEMENT
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     Pressure ulcer of left buttock, stage 3 ([facility]) 2024     Acute osteomyelitis of thoracic spine ([facility]) 2024     MRSA bacteremia 2024     Hypotension 2024     Dysphagia 2024     Deep tissue injury 2024     Hypernatremia 2024     Localized swelling on left hand 2023     Stage 3 chronic kidney disease ([facility]) 2023     Febrile illness 2023     Dermatitis associated with incontinence 2023     Right shoulder pain 12/15/2022     Abnormal urinalysis 2022     Ambulatory dysfunction 2022     Hyperuricemia 2022     Acute metabolic encephalopathy 2022     Acute on chronic diastolic heart failure, LVEF 65%, LVIDd 3.3 cm, AHA Stage C 2022     Acute bronchitis 2022     Assistance needed with transportation 09/15/2022     Abnormal CT of the chest 2022     Gram-positive cocci bacteremia 2022     Psoriasis 2022     COVID-19 2022     Shock ([facility]) 01/10/2022     Persistent atrial fibrillation 01/10/2022     Hyperparathyroidism ([facility]) 2021     Murmur, cardiac 2021     BPPV (benign paroxysmal positional vertigo) 2018     Seasonal allergic rhinitis due to pollen 2018     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     Osteoporosis 2016     Leukopenia 2016     Thrombocytopenia ([facility]) 2016     Rheumatoid arthritis ([facility]) 2016     GERD  (gastroesophageal reflux disease) 08/23/2016     Sarcoid 08/23/2016     Morbid obesity (HCC) 07/12/2016     Vitamin D deficiency 07/16/2015     Essential hypertension 12/04/2014     Lumbar radiculopathy 12/04/2014       LOS (days): 5  Geometric Mean LOS (GMLOS) (days): 5.1  Days to GMLOS:-0.1     OBJECTIVE:  Risk of Unplanned Readmission Score: 37.02         Current admission status: Inpatient   Preferred Pharmacy:   [id number] - [place], [place] - [address]  [address]  [place] [address]  Phone: [phone] Fax: [phone]    [facility] - [address] [address]  Phone: [phone] Fax: [phone]    Primary Care Provider: Nya Taveras DO    Primary Insurance: MEDICARE  Secondary Insurance: [facility]    DISCHARGE DETAILS:                           Contacts  Patient Contacts: Rhoda Al (Dtr)  Relationship to Patient:: Family  Contact Method: Phone  Phone Number: [phone]  Reason/Outcome: Discharge Planning                   Would you like to participate in our [facility] service program?  : No - Declined    Treatment Team Recommendation: SNF  Discharge Destination Plan:: SNF ([facility]  Transport at Discharge : [facility]  Dispatcher Contacted: Yes  Number/Name of Dispatcher: Roundtrip  Transported by (Company and Unit #): [facility]  ETA of Transport (Date): 07/28/24  ETA of Transport (Time): 1200              IMM Given (Date):: 07/26/24  IMM Given to:: Family (VM left for pt's daughter Rhoda)          Accepting Facility Name, City & State : [facility]  Receiving Facility/Agency Phone Number: [phone]  Facility/Agency Fax Number: [phone]

## 2024-07-26 NOTE — NURSING NOTE
Rn received call from patient's nieceKarely. Update on patient given. Karely requested for RNs to please keep her updated.    Whalan 7/26/2024 4:45 PM

## 2024-07-27 ENCOUNTER — APPOINTMENT (INPATIENT)
Dept: CT IMAGING | Facility: HOSPITAL | Age: 74
DRG: 871 | End: 2024-07-27
Payer: MEDICARE

## 2024-07-27 PROBLEM — R41.89 UNRESPONSIVE: Status: ACTIVE | Noted: 2024-07-27

## 2024-07-27 LAB
AMMONIA PLAS-SCNC: 12 UMOL/L (ref 18–72)
ANION GAP SERPL CALCULATED.3IONS-SCNC: 11 MMOL/L (ref 4–13)
ANION GAP SERPL CALCULATED.3IONS-SCNC: 9 MMOL/L (ref 4–13)
APTT PPP: 33 SECONDS (ref 23–37)
APTT PPP: >210 SECONDS (ref 23–37)
BASE EX.OXY STD BLDV CALC-SCNC: 89.6 % (ref 60–80)
BASE EXCESS BLDV CALC-SCNC: 1.3 MMOL/L
BASOPHILS # BLD AUTO: 0.02 THOUSANDS/ÂΜL (ref 0–0.1)
BASOPHILS NFR BLD AUTO: 0 % (ref 0–1)
BUN SERPL-MCNC: 28 MG/DL (ref 5–25)
BUN SERPL-MCNC: 28 MG/DL (ref 5–25)
CALCIUM SERPL-MCNC: 9.6 MG/DL (ref 8.4–10.2)
CALCIUM SERPL-MCNC: 9.8 MG/DL (ref 8.4–10.2)
CHLORIDE SERPL-SCNC: 112 MMOL/L (ref 96–108)
CHLORIDE SERPL-SCNC: 113 MMOL/L (ref 96–108)
CO2 SERPL-SCNC: 25 MMOL/L (ref 21–32)
CO2 SERPL-SCNC: 27 MMOL/L (ref 21–32)
CREAT SERPL-MCNC: 1.93 MG/DL (ref 0.6–1.3)
CREAT SERPL-MCNC: 1.94 MG/DL (ref 0.6–1.3)
EOSINOPHIL # BLD AUTO: 0.13 THOUSAND/ÂΜL (ref 0–0.61)
EOSINOPHIL NFR BLD AUTO: 2 % (ref 0–6)
ERYTHROCYTE [DISTWIDTH] IN BLOOD BY AUTOMATED COUNT: 17.2 % (ref 11.6–15.1)
ERYTHROCYTE [DISTWIDTH] IN BLOOD BY AUTOMATED COUNT: 17.6 % (ref 11.6–15.1)
GFR SERPL CREATININE-BSD FRML MDRD: 24 ML/MIN/1.73SQ M
GFR SERPL CREATININE-BSD FRML MDRD: 24 ML/MIN/1.73SQ M
GLUCOSE SERPL-MCNC: 108 MG/DL (ref 65–140)
GLUCOSE SERPL-MCNC: 109 MG/DL (ref 65–140)
GLUCOSE SERPL-MCNC: 138 MG/DL (ref 65–140)
GLUCOSE SERPL-MCNC: 145 MG/DL (ref 65–140)
GLUCOSE SERPL-MCNC: 53 MG/DL (ref 65–140)
GLUCOSE SERPL-MCNC: 69 MG/DL (ref 65–140)
GLUCOSE SERPL-MCNC: 70 MG/DL (ref 65–140)
GLUCOSE SERPL-MCNC: 78 MG/DL (ref 65–140)
GLUCOSE SERPL-MCNC: 79 MG/DL (ref 65–140)
GLUCOSE SERPL-MCNC: 92 MG/DL (ref 65–140)
GLUCOSE SERPL-MCNC: 99 MG/DL (ref 65–140)
HCO3 BLDV-SCNC: 24.9 MMOL/L (ref 24–30)
HCT VFR BLD AUTO: 29.4 % (ref 34.8–46.1)
HCT VFR BLD AUTO: 31.2 % (ref 34.8–46.1)
HGB BLD-MCNC: 9.3 G/DL (ref 11.5–15.4)
HGB BLD-MCNC: 9.6 G/DL (ref 11.5–15.4)
IMM GRANULOCYTES # BLD AUTO: 0.04 THOUSAND/UL (ref 0–0.2)
IMM GRANULOCYTES NFR BLD AUTO: 1 % (ref 0–2)
INR PPP: 1.96 (ref 0.84–1.19)
LYMPHOCYTES # BLD AUTO: 0.77 THOUSANDS/ÂΜL (ref 0.6–4.47)
LYMPHOCYTES NFR BLD AUTO: 14 % (ref 14–44)
MAGNESIUM SERPL-MCNC: 2 MG/DL (ref 1.9–2.7)
MCH RBC QN AUTO: 28.2 PG (ref 26.8–34.3)
MCH RBC QN AUTO: 28.5 PG (ref 26.8–34.3)
MCHC RBC AUTO-ENTMCNC: 30.8 G/DL (ref 31.4–37.4)
MCHC RBC AUTO-ENTMCNC: 31.6 G/DL (ref 31.4–37.4)
MCV RBC AUTO: 90 FL (ref 82–98)
MCV RBC AUTO: 92 FL (ref 82–98)
MONOCYTES # BLD AUTO: 0.42 THOUSAND/ÂΜL (ref 0.17–1.22)
MONOCYTES NFR BLD AUTO: 8 % (ref 4–12)
NEUTROPHILS # BLD AUTO: 4.01 THOUSANDS/ÂΜL (ref 1.85–7.62)
NEUTS SEG NFR BLD AUTO: 75 % (ref 43–75)
NON VENT ROOM AIR: 21 %
NRBC BLD AUTO-RTO: 1 /100 WBCS
O2 CT BLDV-SCNC: 13.7 ML/DL
PCO2 BLDV: 35.8 MM HG (ref 42–50)
PH BLDV: 7.46 [PH] (ref 7.3–7.4)
PLATELET # BLD AUTO: 131 THOUSANDS/UL (ref 149–390)
PLATELET # BLD AUTO: 163 THOUSANDS/UL (ref 149–390)
PMV BLD AUTO: 14 FL (ref 8.9–12.7)
PO2 BLDV: 65.8 MM HG (ref 35–45)
POTASSIUM SERPL-SCNC: 5.2 MMOL/L (ref 3.5–5.3)
POTASSIUM SERPL-SCNC: 5.4 MMOL/L (ref 3.5–5.3)
PROTHROMBIN TIME: 22.4 SECONDS (ref 11.6–14.5)
RBC # BLD AUTO: 3.26 MILLION/UL (ref 3.81–5.12)
RBC # BLD AUTO: 3.4 MILLION/UL (ref 3.81–5.12)
SODIUM SERPL-SCNC: 147 MMOL/L (ref 135–147)
SODIUM SERPL-SCNC: 150 MMOL/L (ref 135–147)
TSH SERPL DL<=0.05 MIU/L-ACNC: 3.66 UIU/ML (ref 0.45–4.5)
WBC # BLD AUTO: 5.39 THOUSAND/UL (ref 4.31–10.16)
WBC # BLD AUTO: 9.12 THOUSAND/UL (ref 4.31–10.16)

## 2024-07-27 PROCEDURE — 83735 ASSAY OF MAGNESIUM: CPT | Performed by: INTERNAL MEDICINE

## 2024-07-27 PROCEDURE — 85025 COMPLETE CBC W/AUTO DIFF WBC: CPT | Performed by: INTERNAL MEDICINE

## 2024-07-27 PROCEDURE — 82948 REAGENT STRIP/BLOOD GLUCOSE: CPT

## 2024-07-27 PROCEDURE — 85610 PROTHROMBIN TIME: CPT | Performed by: NURSE PRACTITIONER

## 2024-07-27 PROCEDURE — 82140 ASSAY OF AMMONIA: CPT | Performed by: NURSE PRACTITIONER

## 2024-07-27 PROCEDURE — 70450 CT HEAD/BRAIN W/O DYE: CPT

## 2024-07-27 PROCEDURE — 84443 ASSAY THYROID STIM HORMONE: CPT | Performed by: NURSE PRACTITIONER

## 2024-07-27 PROCEDURE — 99291 CRITICAL CARE FIRST HOUR: CPT | Performed by: INTERNAL MEDICINE

## 2024-07-27 PROCEDURE — 85730 THROMBOPLASTIN TIME PARTIAL: CPT | Performed by: NURSE PRACTITIONER

## 2024-07-27 PROCEDURE — 80048 BASIC METABOLIC PNL TOTAL CA: CPT | Performed by: NURSE PRACTITIONER

## 2024-07-27 PROCEDURE — 85027 COMPLETE CBC AUTOMATED: CPT | Performed by: NURSE PRACTITIONER

## 2024-07-27 PROCEDURE — 80048 BASIC METABOLIC PNL TOTAL CA: CPT | Performed by: INTERNAL MEDICINE

## 2024-07-27 PROCEDURE — 82805 BLOOD GASES W/O2 SATURATION: CPT | Performed by: NURSE PRACTITIONER

## 2024-07-27 PROCEDURE — 99232 SBSQ HOSP IP/OBS MODERATE 35: CPT | Performed by: STUDENT IN AN ORGANIZED HEALTH CARE EDUCATION/TRAINING PROGRAM

## 2024-07-27 RX ORDER — METOPROLOL TARTRATE 1 MG/ML
5 INJECTION, SOLUTION INTRAVENOUS EVERY 6 HOURS
Status: DISCONTINUED | OUTPATIENT
Start: 2024-07-27 | End: 2024-07-29

## 2024-07-27 RX ORDER — DEXTROSE MONOHYDRATE 25 G/50ML
INJECTION, SOLUTION INTRAVENOUS
Status: COMPLETED
Start: 2024-07-27 | End: 2024-07-27

## 2024-07-27 RX ORDER — CHLORHEXIDINE GLUCONATE ORAL RINSE 1.2 MG/ML
15 SOLUTION DENTAL EVERY 12 HOURS SCHEDULED
Status: DISCONTINUED | OUTPATIENT
Start: 2024-07-27 | End: 2024-08-02 | Stop reason: HOSPADM

## 2024-07-27 RX ORDER — HEPARIN SODIUM 10000 [USP'U]/100ML
3-20 INJECTION, SOLUTION INTRAVENOUS
Status: DISCONTINUED | OUTPATIENT
Start: 2024-07-27 | End: 2024-07-29

## 2024-07-27 RX ORDER — PANTOPRAZOLE SODIUM 40 MG/10ML
40 INJECTION, POWDER, LYOPHILIZED, FOR SOLUTION INTRAVENOUS
Status: DISCONTINUED | OUTPATIENT
Start: 2024-07-27 | End: 2024-07-30

## 2024-07-27 RX ORDER — HEPARIN SODIUM 1000 [USP'U]/ML
2000 INJECTION, SOLUTION INTRAVENOUS; SUBCUTANEOUS EVERY 6 HOURS PRN
Status: DISCONTINUED | OUTPATIENT
Start: 2024-07-27 | End: 2024-07-29

## 2024-07-27 RX ORDER — METOPROLOL TARTRATE 1 MG/ML
2.5 INJECTION, SOLUTION INTRAVENOUS EVERY 6 HOURS
Status: DISCONTINUED | OUTPATIENT
Start: 2024-07-27 | End: 2024-07-27

## 2024-07-27 RX ORDER — DIGOXIN 0.25 MG/ML
250 INJECTION INTRAMUSCULAR; INTRAVENOUS DAILY
Status: DISCONTINUED | OUTPATIENT
Start: 2024-07-27 | End: 2024-07-28

## 2024-07-27 RX ORDER — HEPARIN SODIUM 1000 [USP'U]/ML
4000 INJECTION, SOLUTION INTRAVENOUS; SUBCUTANEOUS EVERY 6 HOURS PRN
Status: DISCONTINUED | OUTPATIENT
Start: 2024-07-27 | End: 2024-07-29

## 2024-07-27 RX ORDER — DEXTROSE MONOHYDRATE 50 MG/ML
100 INJECTION, SOLUTION INTRAVENOUS CONTINUOUS
Status: DISCONTINUED | OUTPATIENT
Start: 2024-07-27 | End: 2024-07-28

## 2024-07-27 RX ORDER — METHYLPREDNISOLONE SODIUM SUCCINATE 40 MG/ML
4 INJECTION, POWDER, LYOPHILIZED, FOR SOLUTION INTRAMUSCULAR; INTRAVENOUS EVERY 12 HOURS SCHEDULED
Status: DISCONTINUED | OUTPATIENT
Start: 2024-07-27 | End: 2024-07-29

## 2024-07-27 RX ADMIN — METHYLPREDNISOLONE SODIUM SUCCINATE 4 MG: 40 INJECTION, POWDER, FOR SOLUTION INTRAMUSCULAR; INTRAVENOUS at 21:20

## 2024-07-27 RX ADMIN — CHLORHEXIDINE GLUCONATE 15 ML: 1.2 RINSE ORAL at 21:20

## 2024-07-27 RX ADMIN — PANTOPRAZOLE SODIUM 40 MG: 40 INJECTION, POWDER, FOR SOLUTION INTRAVENOUS at 13:06

## 2024-07-27 RX ADMIN — METOROPROLOL TARTRATE 5 MG: 5 INJECTION, SOLUTION INTRAVENOUS at 17:17

## 2024-07-27 RX ADMIN — DEXTROSE MONOHYDRATE 50 ML: 25 INJECTION, SOLUTION INTRAVENOUS at 08:14

## 2024-07-27 RX ADMIN — METOROPROLOL TARTRATE 2.5 MG: 5 INJECTION, SOLUTION INTRAVENOUS at 13:06

## 2024-07-27 RX ADMIN — DEXTROSE MONOHYDRATE 50 ML: 25 INJECTION, SOLUTION INTRAVENOUS at 07:55

## 2024-07-27 RX ADMIN — DIGOXIN 250 MCG: 0.25 INJECTION INTRAMUSCULAR; INTRAVENOUS at 14:50

## 2024-07-27 RX ADMIN — DEXTROSE MONOHYDRATE 50 ML: 25 INJECTION, SOLUTION INTRAVENOUS at 06:41

## 2024-07-27 RX ADMIN — METHYLPREDNISOLONE SODIUM SUCCINATE 4 MG: 40 INJECTION, POWDER, FOR SOLUTION INTRAMUSCULAR; INTRAVENOUS at 13:06

## 2024-07-27 RX ADMIN — DEXTROSE 100 ML/HR: 5 SOLUTION INTRAVENOUS at 14:59

## 2024-07-27 RX ADMIN — Medication: at 17:17

## 2024-07-27 RX ADMIN — HEPARIN SODIUM 11.1 UNITS/KG/HR: 10000 INJECTION, SOLUTION INTRAVENOUS at 15:26

## 2024-07-27 NOTE — PROGRESS NOTES
Cardiology Progress Note - Bhavna Al 74 y.o. female MRN: [id number]    Unit/Bed#: ICU 06 Encounter: [id number]      Assessment & Plan:    Unresponsive/lethargy  -Patient with significant hypoactive delirium  - VBG without significant hypercarbia  - Ordered for CT head today and checking TSH and ammonia    Septic shock secondary to MRSA bacteremia  - Suspected T9-T10 osteomyelitis  - Blood cultures positive for MRSA on 6/6/2024  - Currently on antibiotics    Acute on chronic diastolic heart failure, LVEF 65%, LVIDd 3.3 cm, AHA Stage C  -TTE 7/22/2024 showed EF greater than 75%, mild TR, estimated PA pressure of 62 mmHg  - TTE 6/8/2024 showed EF 65%, low normal RV function, severe LA, technically difficult study  - Outpatient diuretic Rx furosemide 20 mg p.o. daily  - Current diuretic Rx Bumex 4 mg IV twice daily-> discontinued this morning    Persistent atrial fibrillation with intermittent RVR  -Anticoagulant Eliquis 5 mg twice daily prior to admission  - Currently on heparin drip  - Outpatient rate control Lopressor 25 mg twice daily  - Currently on Lopressor 2.5 mg every 6 hours    Elevated troponin  - Troponin trend 1374->1239->296  - Likely non-MI troponin elevation in setting of sepsis and acute kidney injury    Thrombocytopenia (HCC)    Rheumatoid arthritis (HCC)    Morbid obesity (HCC)    Ambulatory dysfunction  - Patient bedbound since February 2024, lives in nursing home    MRSA bacteremia    Acute kidney injury superimposed on chronic kidney disease  (HCC)    Chronic pruritic rash in adult      Summary:  - Sodium trended up to 150 today, renal function continues to improve  - Holding diuretics due to possible overdiuresis  - A-fib rates are elevated again today  - If blood pressure remains stable, can increase Lopressor to 5 mg every 6 hours   - can also loaded with an additional 250 mcg of digoxin IV x 1 today  - Guarded prognosis    Subjective:   Due to her persistent lethargy/unresponsiveness, she was  "transferred to the ICU for closer monitoring this morning.  Unable to obtain ROS secondary to AMS.    Objective:     Vitals: Blood pressure 115/65, pulse (!) 117, temperature 99.2 °F (37.3 °C), temperature source Axillary, resp. rate (!) 39, height 5' 5\" (1.651 m), weight 96 kg (211 lb 10.3 oz), SpO2 93%., Body mass index is 35.22 kg/m².,   Orthostatic Blood Pressures      Flowsheet Row Most Recent Value   Blood Pressure 115/65 filed at 07/27/2024 1200   Patient Position - Orthostatic VS Lying filed at 07/27/2024 1200              Intake/Output Summary (Last 24 hours) at 7/27/2024 1223  Last data filed at 7/27/2024 0800  Gross per 24 hour   Intake 150 ml   Output 1820 ml   Net -1670 ml           Physical Exam:    GEN: Bhavna JANUSZ Al appears lethargic, moves spontaneously, but not following commands  HEENT: Mucous membranes moist, no scleral icterus, no conjunctival pallor  NECK: No significant JVD  HEART: Tachycardic, irregularly irregular rhythm, no significant audible murmurs  LUNGS: Poor inspiratory effort, grossly clear to auscultation anteriorly  ABDOMEN: normal bowel sounds, soft, no tenderness, no distention  EXTREMITIES: peripheral pulses normal; no lower extremity edema   NEURO: not following commands  SKIN: No lesions or rashes on exposed skin        Current Facility-Administered Medications:     acetaminophen (TYLENOL) tablet 650 mg, 650 mg, Oral, Q4H PRN, AZIZA Batista    ammonium lactate (LAC-HYDRIN) 12 % cream, , Topical, BID, AZIZA Batista, Given at 07/26/24 1722    chlorhexidine (PERIDEX) 0.12 % oral rinse 15 mL, 15 mL, Mouth/Throat, Q12H FAMILIAEric CRNP    dextrose 5 % infusion, 100 mL/hr, Intravenous, Continuous, AZIZA Batista    doxycycline hyclate (VIBRAMYCIN) capsule 100 mg, 100 mg, Oral, Q12H FAMILIA, AZIZA Batista, 100 mg at 07/26/24 2023    heparin (porcine) 25,000 units in 0.45% NaCl 250 mL infusion (premix), 3-20 Units/kg/hr (Order-Specific), " Intravenous, Titrated, Chuckie Cuevas MD    heparin (porcine) injection 2,000 Units, 2,000 Units, Intravenous, Q6H PRN, Chuckie Cuevas MD    heparin (porcine) injection 4,000 Units, 4,000 Units, Intravenous, Q6H PRN, Chuckie Cuevas MD    levalbuterol (XOPENEX) inhalation solution 1.25 mg, 1.25 mg, Nebulization, Q8H PRN, Eric Mcgowanek, [id number]    methylPREDNISolone sodium succinate (Solu-MEDROL) injection 4 mg, 4 mg, Intravenous, Q12H FAMILIA, Eric Staleycsek, CRNP    metoprolol (LOPRESSOR) injection 2.5 mg, 2.5 mg, Intravenous, Q6H, Eric Staleycsek, CRNP    ondansetron (ZOFRAN) injection 4 mg, 4 mg, Intravenous, Q4H PRN, Eric Staleycsek, CRNP, 4 mg at 07/21/24 1241    pantoprazole (PROTONIX) injection 40 mg, 40 mg, Intravenous, Q24H FAMILIA, Eirc Staleycsek, CRNP    trimethobenzamide (TIGAN) IM injection 200 mg, 200 mg, Intramuscular, Q6H PRN, Eric Staleycsek, CRNP    Labs & Results:    Lab Results   Component Value Date    CKTOTAL 42 07/05/2024    CKTOTAL 78 07/04/2024    CKTOTAL 149 07/03/2024    CKMB 3.8 07/10/2022    CKMBINDEX 1.1 07/10/2022       Lab Results   Component Value Date    GLUCOSE 123 11/21/2022    CALCIUM 9.8 07/27/2024    K 5.2 07/27/2024    CO2 27 07/27/2024     (H) 07/27/2024    BUN 28 (H) 07/27/2024    CREATININE 1.93 (H) 07/27/2024       Lab Results   Component Value Date    WBC 5.39 07/27/2024    HGB 9.3 (L) 07/27/2024    HCT 29.4 (L) 07/27/2024    MCV 90 07/27/2024     (L) 07/27/2024     Results from last 7 days   Lab Units 07/24/24  1251   INR  1.46*       Lab Results   Component Value Date    CHOL 196 10/06/2017    CHOL 177 03/23/2015     Lab Results   Component Value Date    HDL 41 (L) 03/11/2022    HDL 61 01/17/2020     Lab Results   Component Value Date    LDLCALC 66 03/11/2022    LDLCALC 95 01/17/2020     Lab Results   Component Value Date    TRIG 81 03/11/2022    TRIG 75 01/17/2020       Lab Results   Component Value Date    ALT 37 07/25/2024    AST 41 (H)  07/25/2024    ALKPHOS 46 07/25/2024         EKG personally reviewed by )Osvaldo Mcdowell MD. No acute changes   TELE: Patient remains in A-fib with RVR, no other significant arrhythmias

## 2024-07-27 NOTE — CONSULTS
[facility]  Consult  Name: Bhavna Al 74 y.o. female I MRN: [id number]  Unit/Bed#: [place] [address] -01 I Date of Admission: 7/21/2024   Date of Service: 7/27/2024 I Hospital Day: 6    Consults    Assessment & Plan   Shock (HCC)  Assessment & Plan  Undifferentiated shock; resolved  Vanco level is above goal so she will not need vancomycin at this moment.  Infectious disease on board, appreciate recommendations  Volume resuscitation completed  Echo shows EF is >75%. LV cavity size is normal. Systolic function is hyperdynamic. Severe annular mitral valve calcification. There is trace regurgitation.  Lactic acid cleared  Blood cultures -negative X 5 days  Urine cultures <1000 cfu  ESR normal and CRP were elevated during last ICU stay  Off of pressors since 7/23/2024    Plan:  Please interventions were all during last ICU admission  Discontinued cefepime and vancomycin  Cosyntropin test to check for adrenal insufficiency as the cause for shock  Follow up baseline ACTH and Cortisol  Cosyntropin administered at 8am   1st cortisol at 8.30 am  2nd cortisol at 9am    Sepsis (HCC)  Assessment & Plan  WBC count normal  Procal elevated but downtrending  Bicarb uptrended to normal after bicarb with D5  drip    Plan:  Raquel treated with cefepime and vancomycin but discontinued since blood cultures and urine cultures are negative.  Monitor white count  Continuous cardiopulmonary monitoring    Thrombocytopenia (HCC)  Assessment & Plan  Recent Labs     07/24/24  0436 07/24/24  1251 07/25/24  0436   PLT 89* 81* 78*     Results of prior workup, patient's platelet count is currently in the 130s  Patient was on heparin drip. Discontinued on 7/24 and started on Eliquis.  3 points on 4T score. Unlikely HIT.    Lab investigations for thrombocytopenia:  D-dimer elevated at 1.11  Antithrombin III activity is decreased  APTT elevated 48  INR elevated 1.46  Direct Vasquez test is negative  DIC ruled out  Fibrin split  products normal  Fibrinogen normal  Hemolysis?  LDH slightly elevated to 96  Reticulocyte count normal.  RI is 0.32 which is less than 2, this means hypoproliferation  Haptoglobin pending    Plan:  Follow up haptoglobin to rule out hemolysis  Follow up doppler ultrasound of bilateral upper and lower limbs. Patient refused yesterday due to pain.  Hematology consulted.  Recommend monitoring platelets. Thrombocytopenia may be related to osteomyelitis/sepsis or antibiotics (cefepime).    Acute kidney injury superimposed on chronic kidney disease  (HCC)  Assessment & Plan  Recent Labs     07/24/24  0436 07/24/24  1550 07/25/24  0436   CREATININE 2.65* 2.57* 2.52*   EGFR 16 17 17     CKD stage 3a  Creatinine baseline is 1-1.2  Monitor kidney indices  Avoid nephrotoxic medications  Improving  Gaby Al is a patient who initially presented to [facility] emergency department secondary to lethargy and hypotension.  She was admitted to the intensive care unit where she underwent extensive testing.  She was found to be fluid responsive.  However, she does have a history of congestive heart failure therefore fluid had to be judiciously applied.  She was started on digoxin secondary to her CHF and atrial fibrillation with rapid ventricular response.  The patient improved and was moved to the medical surgical a floor.    This morning JOSE ALBERTO score was alerted.  Speaking to the patient's nurse, he expressed concern that the patient was not herself and requested that she be examined.  Upon my arrival to the room the patient is laying in bed with her eyes open.  Respirations are even and nonlabored.  She is tachycardic in atrial fibrillation.  She is not responsive to voice.  She does not follow commands.  She does withdraw from pain.  Labs are essentially within normal limits.  Her blood glucose is low at 69 she is being treated with IV dextrose.  Her sodium is climbing at 150.  However, this does not necessarily explain  her lethargy and unresponsive state.  Given these findings, I discussed with the [facility] physician, Dr. Richard and we agreed that the patient should be moved to the intensive care unit for further evaluation and treatment.    EKG demonstrates atrial fibrillation with a rapid ventricular response heart rate is 113  No recent imaging      Physical exam:    HEENT:  Atraumatic normocephalic pupils equal round reactive to light extraocular movements intact sclera anicteric oral mucosa is pink but dry  Neck:  Supple no JVD no lymphadenopathy trachea midline  Chest:  Clear to auscultation bilaterally respirations even and nonlabored on room air  Coronary:  Irregularly irregular.  No murmurs rubs or gallops appreciated.  Tachycardic at 113  Abdomen:  Soft, nontender with positive bowel sounds  Neuro:  Patient does not respond to voice commands.  She does withdraw to pain  Current Outpatient Medications   Medication Instructions    acetaminophen (TYLENOL) 650 mg, Oral, Every 4 hours PRN    albuterol (Ventolin HFA) 90 mcg/act inhaler 2 puffs, Inhalation, Every 6 hours PRN    [START ON 7/28/2024] alendronate (FOSAMAX) 70 mg, Oral, Every 7 days    ammonium lactate (LAC-HYDRIN) 12 % cream Topical, 2 times daily    apixaban (ELIQUIS) 5 mg, Oral, 2 times daily    bisacodyl (FLEET) 10 mg, Once    cholecalciferol (VITAMIN D3) 1,000 Units, Oral, Daily    clobetasol (TEMOVATE) 0.05 % cream Topical, 2 times daily    furosemide (LASIX) 20 mg, Oral, Daily    hydroxychloroquine (PLAQUENIL) 200 mg, Oral, 2 times daily    lidocaine (LIDODERM) 5 % 2 patches, Topical, Daily, Remove & Discard patch within 12 hours or as directed by MD    metoprolol tartrate (LOPRESSOR) 25 mg, Oral, Every 12 hours scheduled    miconazole 2 % cream Topical, 2 times daily    nystatin (MYCOSTATIN) powder Topical, 2 times daily    pantoprazole (PROTONIX) 40 mg, Oral, Daily (early morning)    predniSONE 5 mg, Oral, 2 times daily with meals    triamcinolone (KENALOG) 0.1  % cream Topical, 2 times daily, Apply to BUE and BLE topically everyday and evening shift for psoriasis     Zinc 50 MG TABS 1 tablet, Oral, Daily    Allergies   Allergen Reactions    Shellfish-Derived Products - Food Allergy Itching    Methotrexate Derivatives     Amoxicillin Rash    Ampicillin-Sulbactam Sodium Rash      Social History     Tobacco Use    Smoking status: Never    Smokeless tobacco: Never   Vaping Use    Vaping status: Never Used   Substance Use Topics    Alcohol use: Not Currently    Drug use: No    Family History   Problem Relation Age of Onset    Asthma Mother     Stroke Mother     No Known Problems Father     No Known Problems Sister     No Known Problems Brother     No Known Problems Maternal Grandmother     No Known Problems Maternal Grandfather     No Known Problems Paternal Grandmother     No Known Problems Paternal Grandfather     Diabetes Maternal Aunt     Breast cancer Cousin     Diabetes Cousin     Breast cancer Other         Objective                            Vitals I/O     Coags    No recent results     Additional Electrolytes  Recent Labs     07/27/24  0417   MG 2.0          Blood Gas    No recent results  No recent results LFTs  No recent results    Infectious  No recent results  Glucose  Recent Labs     07/25/24  1553 07/26/24  0924 07/27/24  0417   GLUC 85 82 53*               AZIZA Batista

## 2024-07-27 NOTE — CASE MANAGEMENT
Case Management Discharge Planning Note    Patient name Bhavna Al  Location ICU 06/ICU 06 MRN [id number]  : 1950 Date 2024       Current Admission Date: 2024  Current Admission Diagnosis:Unresponsive   Patient Active Problem List    Diagnosis Date Noted Date Diagnosed    Unresponsive 2024     Elevated troponin 2024     Sepsis ([facility]) 2024     Acute kidney injury superimposed on chronic kidney disease  (HCC) 2024     Hypokalemia 2024     Chronic pruritic rash in adult 2024     Secondary adrenal insufficiency ([facility]) 2024     Pressure ulcer of left buttock, stage 3 ([facility]) 2024     Acute osteomyelitis of thoracic spine ([facility]) 2024     MRSA bacteremia 2024     Hypotension 2024     Dysphagia 2024     Deep tissue injury 2024     Hypernatremia 2024     Localized swelling on left hand 2023     Stage 3 chronic kidney disease ([facility]) 2023     Febrile illness 2023     Dermatitis associated with incontinence 2023     Right shoulder pain 12/15/2022     Abnormal urinalysis 2022     Ambulatory dysfunction 2022     Hyperuricemia 2022     Acute metabolic encephalopathy 2022     Acute on chronic diastolic heart failure, LVEF 65%, LVIDd 3.3 cm, AHA Stage C 2022     Acute bronchitis 2022     Assistance needed with transportation 09/15/2022     Abnormal CT of the chest 2022     Gram-positive cocci bacteremia 2022     Psoriasis 2022     COVID-19 2022     Shock ([facility]) 01/10/2022     Persistent atrial fibrillation 01/10/2022     Hyperparathyroidism ([facility]) 2021     Murmur, cardiac 2021     BPPV (benign paroxysmal positional vertigo) 2018     Seasonal allergic rhinitis due to pollen 2018     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     Osteoporosis 2016     Leukopenia 2016     Thrombocytopenia ([facility]) 2016      Rheumatoid arthritis (HCC) 08/23/2016     GERD (gastroesophageal reflux disease) 08/23/2016     Sarcoid 08/23/2016     Morbid obesity (HCC) 07/12/2016     Vitamin D deficiency 07/16/2015     Essential hypertension 12/04/2014     Lumbar radiculopathy 12/04/2014       LOS (days): 6  Geometric Mean LOS (GMLOS) (days): 5.1  Days to GMLOS:-0.9     OBJECTIVE:  Risk of Unplanned Readmission Score: 37.06         Current admission status: Inpatient   Preferred Pharmacy:   [id number] - [place], [place] - [address]  [address]  [place] [address]  Phone: [phone] Fax: [phone]    [facility] - [address] [address]  Phone: [phone] Fax: [phone]    Primary Care Provider: Nya Taveras DO    Primary Insurance: MEDICARE  Secondary Insurance: [facility]    DISCHARGE DETAILS:                                                                                                 Additional Comments: Pt was planned for d/c tomorrow at noon to [place]; however has transferred to ICU. Saw made aware. Will continue to follow.

## 2024-07-27 NOTE — PROGRESS NOTES
Deterioration Index Critical Care Recommendations  Room #: 220  Deterioration index score: 55%    Critical Care recommends transfer patient to the ICU, patient is less responsive than normal.    Spoke with Dr. Richard from primary team    Brief summary:   Critical care was brought to the patient's bedside via deterioration index alert. The alert was concerning for tachycardia, hypoglycemia, lethargy.     Please contact critical care via [place] [facility] with any questions or concerns.

## 2024-07-27 NOTE — PLAN OF CARE
Problem: Prexisting or High Potential for Compromised Skin Integrity  Goal: Skin integrity is maintained or improved  Description: INTERVENTIONS:  - Identify patients at risk for skin breakdown  - Assess and monitor skin integrity  - Assess and monitor nutrition and hydration status  - Monitor labs   - Assess for incontinence   - Turn and reposition patient  - Assist with mobility/ambulation  - Relieve pressure over bony prominences  - Avoid friction and shearing  - Provide appropriate hygiene as needed including keeping skin clean and dry  - Evaluate need for skin moisturizer/barrier cream  - Collaborate with interdisciplinary team   - Patient/family teaching  - Consider wound care consult   Outcome: Progressing     Problem: PAIN - ADULT  Goal: Verbalizes/displays adequate comfort level or baseline comfort level  Description: Interventions:  - Encourage patient to monitor pain and request assistance  - Assess pain using appropriate pain scale  - Administer analgesics based on type and severity of pain and evaluate response  - Implement non-pharmacological measures as appropriate and evaluate response  - Consider cultural and social influences on pain and pain management  - Notify physician/advanced practitioner if interventions unsuccessful or patient reports new pain  Outcome: Progressing     Problem: INFECTION - ADULT  Goal: Absence or prevention of progression during hospitalization  Description: INTERVENTIONS:  - Assess and monitor for signs and symptoms of infection  - Monitor lab/diagnostic results  - Monitor all insertion sites, i.e. indwelling lines, tubes, and drains  - Monitor endotracheal if appropriate and nasal secretions for changes in amount and color  - Campbell appropriate cooling/warming therapies per order  - Administer medications as ordered  - Instruct and encourage patient and family to use good hand hygiene technique  - Identify and instruct in appropriate isolation precautions for  identified infection/condition  Outcome: Progressing     Problem: SAFETY ADULT  Goal: Patient will remain free of falls  Description: INTERVENTIONS:  - Educate patient/family on patient safety including physical limitations  - Instruct patient to call for assistance with activity   - Consult OT/PT to assist with strengthening/mobility   - Keep Call bell within reach  - Keep bed low and locked with side rails adjusted as appropriate  - Keep care items and personal belongings within reach  - Initiate and maintain comfort rounds  - Make Fall Risk Sign visible to staff  - Offer Toileting every  Hours, in advance of need  - Initiate/Maintain alarm  - Obtain necessary fall risk management equipment:   - Apply yellow socks and bracelet for high fall risk patients  - Consider moving patient to room near nurses station  Outcome: Progressing  Goal: Maintain or return to baseline ADL function  Description: INTERVENTIONS:  -  Assess patient's ability to carry out ADLs; assess patient's baseline for ADL function and identify physical deficits which impact ability to perform ADLs (bathing, care of mouth/teeth, toileting, grooming, dressing, etc.)  - Assess/evaluate cause of self-care deficits   - Assess range of motion  - Assess patient's mobility; develop plan if impaired  - Assess patient's need for assistive devices and provide as appropriate  - Encourage maximum independence but intervene and supervise when necessary  - Involve family in performance of ADLs  - Assess for home care needs following discharge   - Consider OT consult to assist with ADL evaluation and planning for discharge  - Provide patient education as appropriate  Outcome: Progressing  Goal: Maintains/Returns to pre admission functional level  Description: INTERVENTIONS:  - Perform AM-PAC 6 Click Basic Mobility/ Daily Activity assessment daily.  - Set and communicate daily mobility goal to care team and patient/family/caregiver.   - Collaborate with  rehabilitation services on mobility goals if consulted  - Perform Range of Motion  times a day.  - Reposition patient every  hours.  - Dangle patient  times a day  - Stand patient  times a day  - Ambulate patient  times a day  - Out of bed to chair  times a day   - Out of bed for meal times a day  - Out of bed for toileting  - Record patient progress and toleration of activity level   Outcome: Progressing     Problem: DISCHARGE PLANNING  Goal: Discharge to home or other facility with appropriate resources  Description: INTERVENTIONS:  - Identify barriers to discharge w/patient and caregiver  - Arrange for needed discharge resources and transportation as appropriate  - Identify discharge learning needs (meds, wound care, etc.)  - Arrange for interpretive services to assist at discharge as needed  - Refer to Case Management Department for coordinating discharge planning if the patient needs post-hospital services based on physician/advanced practitioner order or complex needs related to functional status, cognitive ability, or social support system  Outcome: Progressing     Problem: Knowledge Deficit  Goal: Patient/family/caregiver demonstrates understanding of disease process, treatment plan, medications, and discharge instructions  Description: Complete learning assessment and assess knowledge base.  Interventions:  - Provide teaching at level of understanding  - Provide teaching via preferred learning methods  Outcome: Progressing     Problem: CARDIOVASCULAR - ADULT  Goal: Maintains optimal cardiac output and hemodynamic stability  Description: INTERVENTIONS:  - Monitor I/O, vital signs and rhythm  - Monitor for S/S and trends of decreased cardiac output  - Administer and titrate ordered vasoactive medications to optimize hemodynamic stability  - Assess quality of pulses, skin color and temperature  - Assess for signs of decreased coronary artery perfusion  - Instruct patient to report change in severity of  symptoms  Outcome: Progressing  Goal: Absence of cardiac dysrhythmias or at baseline rhythm  Description: INTERVENTIONS:  - Continuous cardiac monitoring, vital signs, obtain 12 lead EKG if ordered  - Administer antiarrhythmic and heart rate control medications as ordered  - Monitor electrolytes and administer replacement therapy as ordered  Outcome: Progressing     Problem: RESPIRATORY - ADULT  Goal: Achieves optimal ventilation and oxygenation  Description: INTERVENTIONS:  - Assess for changes in respiratory status  - Assess for changes in mentation and behavior  - Position to facilitate oxygenation and minimize respiratory effort  - Oxygen administered by appropriate delivery if ordered  - Initiate smoking cessation education as indicated  - Encourage broncho-pulmonary hygiene including cough, deep breathe, Incentive Spirometry  - Assess the need for suctioning and aspirate as needed  - Assess and instruct to report SOB or any respiratory difficulty  - Respiratory Therapy support as indicated  Outcome: Progressing     Problem: METABOLIC, FLUID AND ELECTROLYTES - ADULT  Goal: Electrolytes maintained within normal limits  Description: INTERVENTIONS:  - Monitor labs and assess patient for signs and symptoms of electrolyte imbalances  - Administer electrolyte replacement as ordered  - Monitor response to electrolyte replacements, including repeat lab results as appropriate  - Instruct patient on fluid and nutrition as appropriate  Outcome: Progressing  Goal: Fluid balance maintained  Description: INTERVENTIONS:  - Monitor labs   - Monitor I/O and WT  - Instruct patient on fluid and nutrition as appropriate  - Assess for signs & symptoms of volume excess or deficit  Outcome: Progressing  Goal: Glucose maintained within target range  Description: INTERVENTIONS:  - Monitor Blood Glucose as ordered  - Assess for signs and symptoms of hyperglycemia and hypoglycemia  - Administer ordered medications to maintain glucose  within target range  - Assess nutritional intake and initiate nutrition service referral as needed  Outcome: Progressing     Problem: HEMATOLOGIC - ADULT  Goal: Maintains hematologic stability  Description: INTERVENTIONS  - Assess for signs and symptoms of bleeding or hemorrhage  - Monitor labs  - Administer supportive blood products/factors as ordered and appropriate  Outcome: Progressing     Problem: Nutrition/Hydration-ADULT  Goal: Nutrient/Hydration intake appropriate for improving, restoring or maintaining nutritional needs  Description: Monitor and assess patient's nutrition/hydration status for malnutrition. Collaborate with interdisciplinary team and initiate plan and interventions as ordered.  Monitor patient's weight and dietary intake as ordered or per policy. Utilize nutrition screening tool and intervene as necessary. Determine patient's food preferences and provide high-protein, high-caloric foods as appropriate.     INTERVENTIONS:  - Monitor oral intake, urinary output, labs, and treatment plans  - Assess nutrition and hydration status and recommend course of action  - Evaluate amount of meals eaten  - Assist patient with eating if necessary   - Allow adequate time for meals  - Recommend/ encourage appropriate diets, oral nutritional supplements, and vitamin/mineral supplements  - Order, calculate, and assess calorie counts as needed  - Recommend, monitor, and adjust tube feedings and TPN/PPN based on assessed needs  - Assess need for intravenous fluids  - Provide specific nutrition/hydration education as appropriate  - Include patient/family/caregiver in decisions related to nutrition  Outcome: Progressing

## 2024-07-27 NOTE — PROGRESS NOTES
07/26/24 2300   Provider Notification   Reason for Communication Evaluate  (orientation hard to assess- only opens eyes to voice.)   Provider Name Dennise   Provider Role Hospitalist   Method of Communication Face to face   Response At bedside  (seen patient)   Notification Time 2300     SLIM made aware that patient unable to answer questions, would open her eyes when staff calls her name. Localizes pain- moans to communicate pain. Seen by provider at bedside. VSS. Monitored accordingly.

## 2024-07-27 NOTE — ASSESSMENT & PLAN NOTE
Received a DI score on this patient patient 25 July 2024  Upon exam the patient is laying in bed, eyes are open but she does not respond to verbal stimulation.  Respirations are normal she is tachycardic.  She is hypoglycemic at 69, her sodium is 150.  However, these measurements do not account for her unresponsiveness.  I will transfer the patient to the intensive care unit for higher level of care  Spoke to the patient's niece, no other family members answered their phone at this time.  Notified SLIM of the transfer, Dr. Richard.  She agreed with the transfer  Continue n.p.o. for now secondary to patient's decreased mental status  Stage IIIa chronic kidney disease check for uremia

## 2024-07-28 LAB
ALBUMIN SERPL BCG-MCNC: 2.9 G/DL (ref 3.5–5)
ALP SERPL-CCNC: 74 U/L (ref 34–104)
ALT SERPL W P-5'-P-CCNC: 42 U/L (ref 7–52)
ANION GAP SERPL CALCULATED.3IONS-SCNC: 8 MMOL/L (ref 4–13)
APTT PPP: 37 SECONDS (ref 23–37)
APTT PPP: 46 SECONDS (ref 23–37)
APTT PPP: 49 SECONDS (ref 23–37)
AST SERPL W P-5'-P-CCNC: 37 U/L (ref 13–39)
BILIRUB SERPL-MCNC: 0.76 MG/DL (ref 0.2–1)
BUN SERPL-MCNC: 31 MG/DL (ref 5–25)
CA-I BLD-SCNC: 1.03 MMOL/L (ref 1.12–1.32)
CALCIUM ALBUM COR SERPL-MCNC: 10.1 MG/DL (ref 8.3–10.1)
CALCIUM SERPL-MCNC: 9.2 MG/DL (ref 8.4–10.2)
CHLORIDE SERPL-SCNC: 109 MMOL/L (ref 96–108)
CO2 SERPL-SCNC: 26 MMOL/L (ref 21–32)
CREAT SERPL-MCNC: 2 MG/DL (ref 0.6–1.3)
ERYTHROCYTE [DISTWIDTH] IN BLOOD BY AUTOMATED COUNT: 17.4 % (ref 11.6–15.1)
GFR SERPL CREATININE-BSD FRML MDRD: 23 ML/MIN/1.73SQ M
GLUCOSE SERPL-MCNC: 110 MG/DL (ref 65–140)
GLUCOSE SERPL-MCNC: 115 MG/DL (ref 65–140)
GLUCOSE SERPL-MCNC: 116 MG/DL (ref 65–140)
GLUCOSE SERPL-MCNC: 136 MG/DL (ref 65–140)
GLUCOSE SERPL-MCNC: 160 MG/DL (ref 65–140)
GLUCOSE SERPL-MCNC: 96 MG/DL (ref 65–140)
HCT VFR BLD AUTO: 29.9 % (ref 34.8–46.1)
HGB BLD-MCNC: 9.4 G/DL (ref 11.5–15.4)
MAGNESIUM SERPL-MCNC: 1.6 MG/DL (ref 1.9–2.7)
MCH RBC QN AUTO: 28 PG (ref 26.8–34.3)
MCHC RBC AUTO-ENTMCNC: 31.4 G/DL (ref 31.4–37.4)
MCV RBC AUTO: 89 FL (ref 82–98)
PLATELET # BLD AUTO: 187 THOUSANDS/UL (ref 149–390)
POTASSIUM SERPL-SCNC: 5.1 MMOL/L (ref 3.5–5.3)
PROT SERPL-MCNC: 5.8 G/DL (ref 6.4–8.4)
RBC # BLD AUTO: 3.36 MILLION/UL (ref 3.81–5.12)
SODIUM SERPL-SCNC: 143 MMOL/L (ref 135–147)
WBC # BLD AUTO: 10.72 THOUSAND/UL (ref 4.31–10.16)

## 2024-07-28 PROCEDURE — 82330 ASSAY OF CALCIUM: CPT | Performed by: NURSE PRACTITIONER

## 2024-07-28 PROCEDURE — 85730 THROMBOPLASTIN TIME PARTIAL: CPT | Performed by: INTERNAL MEDICINE

## 2024-07-28 PROCEDURE — 83735 ASSAY OF MAGNESIUM: CPT | Performed by: NURSE PRACTITIONER

## 2024-07-28 PROCEDURE — 92526 ORAL FUNCTION THERAPY: CPT

## 2024-07-28 PROCEDURE — 85027 COMPLETE CBC AUTOMATED: CPT | Performed by: NURSE PRACTITIONER

## 2024-07-28 PROCEDURE — 99232 SBSQ HOSP IP/OBS MODERATE 35: CPT | Performed by: STUDENT IN AN ORGANIZED HEALTH CARE EDUCATION/TRAINING PROGRAM

## 2024-07-28 PROCEDURE — 99291 CRITICAL CARE FIRST HOUR: CPT | Performed by: INTERNAL MEDICINE

## 2024-07-28 PROCEDURE — 80053 COMPREHEN METABOLIC PANEL: CPT | Performed by: NURSE PRACTITIONER

## 2024-07-28 PROCEDURE — 82948 REAGENT STRIP/BLOOD GLUCOSE: CPT

## 2024-07-28 RX ORDER — DIGOXIN 0.25 MG/ML
125 INJECTION INTRAMUSCULAR; INTRAVENOUS DAILY
Status: COMPLETED | OUTPATIENT
Start: 2024-07-28 | End: 2024-07-28

## 2024-07-28 RX ORDER — DEXTROSE MONOHYDRATE AND SODIUM CHLORIDE 5; .45 G/100ML; G/100ML
75 INJECTION, SOLUTION INTRAVENOUS CONTINUOUS
Status: DISCONTINUED | OUTPATIENT
Start: 2024-07-28 | End: 2024-07-30

## 2024-07-28 RX ORDER — MAGNESIUM SULFATE HEPTAHYDRATE 40 MG/ML
2 INJECTION, SOLUTION INTRAVENOUS ONCE
Status: COMPLETED | OUTPATIENT
Start: 2024-07-28 | End: 2024-07-28

## 2024-07-28 RX ORDER — BUMETANIDE 0.25 MG/ML
1 INJECTION INTRAMUSCULAR; INTRAVENOUS ONCE
Status: COMPLETED | OUTPATIENT
Start: 2024-07-28 | End: 2024-07-28

## 2024-07-28 RX ADMIN — METHYLPREDNISOLONE SODIUM SUCCINATE 4 MG: 40 INJECTION, POWDER, FOR SOLUTION INTRAMUSCULAR; INTRAVENOUS at 22:31

## 2024-07-28 RX ADMIN — BUMETANIDE 1 MG: 0.25 INJECTION INTRAMUSCULAR; INTRAVENOUS at 13:25

## 2024-07-28 RX ADMIN — METOROPROLOL TARTRATE 5 MG: 5 INJECTION, SOLUTION INTRAVENOUS at 17:38

## 2024-07-28 RX ADMIN — CHLORHEXIDINE GLUCONATE 15 ML: 1.2 RINSE ORAL at 08:13

## 2024-07-28 RX ADMIN — Medication: at 08:14

## 2024-07-28 RX ADMIN — METOROPROLOL TARTRATE 5 MG: 5 INJECTION, SOLUTION INTRAVENOUS at 00:47

## 2024-07-28 RX ADMIN — HEPARIN SODIUM 4000 UNITS: 1000 INJECTION INTRAVENOUS; SUBCUTANEOUS at 21:07

## 2024-07-28 RX ADMIN — HEPARIN SODIUM 2000 UNITS: 1000 INJECTION INTRAVENOUS; SUBCUTANEOUS at 14:39

## 2024-07-28 RX ADMIN — Medication: at 17:38

## 2024-07-28 RX ADMIN — DEXTROSE AND SODIUM CHLORIDE 100 ML/HR: 5; .45 INJECTION, SOLUTION INTRAVENOUS at 13:25

## 2024-07-28 RX ADMIN — MAGNESIUM SULFATE HEPTAHYDRATE 2 G: 40 INJECTION, SOLUTION INTRAVENOUS at 08:13

## 2024-07-28 RX ADMIN — DOXYCYCLINE 100 MG: 100 INJECTION, POWDER, LYOPHILIZED, FOR SOLUTION INTRAVENOUS at 13:25

## 2024-07-28 RX ADMIN — DEXTROSE 100 ML/HR: 5 SOLUTION INTRAVENOUS at 01:04

## 2024-07-28 RX ADMIN — HEPARIN SODIUM 2000 UNITS: 1000 INJECTION INTRAVENOUS; SUBCUTANEOUS at 06:51

## 2024-07-28 RX ADMIN — METOROPROLOL TARTRATE 5 MG: 5 INJECTION, SOLUTION INTRAVENOUS at 06:22

## 2024-07-28 RX ADMIN — PANTOPRAZOLE SODIUM 40 MG: 40 INJECTION, POWDER, FOR SOLUTION INTRAVENOUS at 08:12

## 2024-07-28 RX ADMIN — METHYLPREDNISOLONE SODIUM SUCCINATE 4 MG: 40 INJECTION, POWDER, FOR SOLUTION INTRAMUSCULAR; INTRAVENOUS at 08:12

## 2024-07-28 RX ADMIN — METOROPROLOL TARTRATE 5 MG: 5 INJECTION, SOLUTION INTRAVENOUS at 11:41

## 2024-07-28 RX ADMIN — HEPARIN SODIUM 12.1 UNITS/KG/HR: 10000 INJECTION, SOLUTION INTRAVENOUS at 19:54

## 2024-07-28 RX ADMIN — DIGOXIN 125 MCG: 0.25 INJECTION INTRAMUSCULAR; INTRAVENOUS at 14:39

## 2024-07-28 RX ADMIN — CHLORHEXIDINE GLUCONATE 15 ML: 1.2 RINSE ORAL at 22:31

## 2024-07-28 NOTE — ASSESSMENT & PLAN NOTE
Recent Labs     07/26/24  0924 07/27/24  0417 07/27/24  1523   CREATININE 2.17* 1.93* 1.94*   EGFR 21 24 24       CKD stage 3a  Creatinine baseline is 1-1.2 prior to this admission  Monitor kidney indices  Avoid nephrotoxic medications  May be new baseline

## 2024-07-28 NOTE — SPEECH THERAPY NOTE
Speech Language/Pathology    Speech/Language Pathology Progress Note    Patient Name: Bhavna Al  Today's Date: 2024     Problem List  Principal Problem:    Unresponsive  Active Problems:    Thrombocytopenia (HCC)    Rheumatoid arthritis (HCC)    Morbid obesity (HCC)    Acute on chronic diastolic heart failure, LVEF 65%, LVIDd 3.3 cm, AHA Stage C    Ambulatory dysfunction    MRSA bacteremia    Persistent atrial fibrillation    Acute kidney injury superimposed on chronic kidney disease  (HCC)    Chronic pruritic rash in adult    Elevated troponin       Past Medical History  Past Medical History:   Diagnosis Date    Abnormal thyroid function test     last assessed: 2015     Arthritis     Caries     last assessed: 2016     Continuous opioid dependence (HCC) 2021    Edema of right lower extremity     last assessed: 2015     GERD (gastroesophageal reflux disease)     Hypertension     Medicare annual wellness visit, subsequent 2021    Positive blood culture 3/11/2022    Sarcoid         Past Surgical History  Past Surgical History:   Procedure Laterality Date     SECTION      IR NON-TUNNELED CENTRAL LINE PLACEMENT  2024    IR PICC PLACEMENT SINGLE LUMEN  2024    IR PICC PLACEMENT SINGLE LUMEN  2024    MULTIPLE TOOTH EXTRACTIONS N/A 2016    Procedure: Surgical extraction of teeth 2, 18, 19, 30, 31; incision and drainage of left subperiosteal abscess ;  Surgeon: Clara Cevallos DMD;  Location: [facility];  Service:          Subjective:  Pt was positioned upright and alerted to stim.  Objective:  Pt was seen for f/u dysphagia therapy for po potential. Increased lethargy. She does open eyes however decreased command following. Oral care completed with suction, pt tolerated well. Poor oral reception when provided verbal and tactile cues with spoon, straw, and cup. Often would turn head or did not open mouth. Pt accepted small ice chip ans x1 cup sip of  thin however did not initiate transfer with trial. Removed trial via suction. D/c as pt limited by mental status.   Assessment:  Increased lethargy. Poor oral reception. Did not initiate swallow with trials.   Plan/Recommendations:  Recommend continue NPO   Ensure good oral care   Consider GOC, palliative consult Monday ST continue to f/u as indicated

## 2024-07-28 NOTE — ASSESSMENT & PLAN NOTE
Home med is lopressor 25 mg BID  Dig loaded on 7/26-27  Check dig level  Cardiology consulted  Continuous cardiopulmonary monitoring

## 2024-07-28 NOTE — ASSESSMENT & PLAN NOTE
Recent Labs     07/26/24  0924 07/27/24  0417 07/27/24  1523   * 131* 163       Results of prior workup, patient's platelet count is currently in the 130s  Patient was on heparin drip. Discontinued on 7/24 and started on Eliquis.  3 points on 4T score. Unlikely HIT.    Lab investigations for thrombocytopenia:  D-dimer elevated at 1.11  Antithrombin III activity is decreased  APTT elevated 48  INR elevated 1.46  Direct Vasquez test is negative  DIC ruled out  Fibrin split products normal  Fibrinogen normal  Hemolysis?  LDH slightly elevated to 96  Reticulocyte count normal.  RI is 0.32 which is less than 2, this means hypoproliferation  Haptoglobin pending    Plan:  Pancytopenia with hemolysis noted previously- now improved  Ok to continue Heparin gtt for now- was on Eliquis but mental status precludes PO meds at present

## 2024-07-28 NOTE — ASSESSMENT & PLAN NOTE
Received a DI score on this patient patient 27 July 2024  Acute encephalopathy versus worsening hypoactive delirium  Transferred to the ICU for closer monitoring  The exact etiology of her decreased mental status is not known- CT head was negative, electrolytes are acceptable  MRI is pending  Continue n.p.o. for now secondary to patient's decreased mental status  Stage IIIa chronic kidney disease check for uremia

## 2024-07-28 NOTE — PLAN OF CARE
Problem: Prexisting or High Potential for Compromised Skin Integrity  Goal: Skin integrity is maintained or improved  Description: INTERVENTIONS:  - Identify patients at risk for skin breakdown  - Assess and monitor skin integrity  - Assess and monitor nutrition and hydration status  - Monitor labs   - Assess for incontinence   - Turn and reposition patient  - Assist with mobility/ambulation  - Relieve pressure over bony prominences  - Avoid friction and shearing  - Provide appropriate hygiene as needed including keeping skin clean and dry  - Evaluate need for skin moisturizer/barrier cream  - Collaborate with interdisciplinary team   - Patient/family teaching  - Consider wound care consult   Outcome: Progressing     Problem: PAIN - ADULT  Goal: Verbalizes/displays adequate comfort level or baseline comfort level  Description: Interventions:  - Encourage patient to monitor pain and request assistance  - Assess pain using appropriate pain scale  - Administer analgesics based on type and severity of pain and evaluate response  - Implement non-pharmacological measures as appropriate and evaluate response  - Consider cultural and social influences on pain and pain management  - Notify physician/advanced practitioner if interventions unsuccessful or patient reports new pain  Outcome: Progressing     Problem: INFECTION - ADULT  Goal: Absence or prevention of progression during hospitalization  Description: INTERVENTIONS:  - Assess and monitor for signs and symptoms of infection  - Monitor lab/diagnostic results  - Monitor all insertion sites, i.e. indwelling lines, tubes, and drains  - Monitor endotracheal if appropriate and nasal secretions for changes in amount and color  - [place] appropriate cooling/warming therapies per order  - Administer medications as ordered  - Instruct and encourage patient and family to use good hand hygiene technique  - Identify and instruct in appropriate isolation precautions for  identified infection/condition  Outcome: Progressing     Problem: SAFETY ADULT  Goal: Patient will remain free of falls  Description: INTERVENTIONS:  - Educate patient/family on patient safety including physical limitations  - Instruct patient to call for assistance with activity   - Consult OT/PT to assist with strengthening/mobility   - Keep Call bell within reach  - Keep bed low and locked with side rails adjusted as appropriate  - Keep care items and personal belongings within reach  - Initiate and maintain comfort rounds  - Make Fall Risk Sign visible to staff  - Offer Toileting every 2 Hours, in advance of need  - Initiate/Maintain bed alarm  - Obtain necessary fall risk management equipment  - Apply yellow socks and bracelet for high fall risk patients  - Consider moving patient to room near nurses station  Outcome: Progressing  Goal: Maintain or return to baseline ADL function  Description: INTERVENTIONS:  -  Assess patient's ability to carry out ADLs; assess patient's baseline for ADL function and identify physical deficits which impact ability to perform ADLs (bathing, care of mouth/teeth, toileting, grooming, dressing, etc.)  - Assess/evaluate cause of self-care deficits   - Assess range of motion  - Assess patient's mobility; develop plan if impaired  - Assess patient's need for assistive devices and provide as appropriate  - Encourage maximum independence but intervene and supervise when necessary  - Involve family in performance of ADLs  - Assess for home care needs following discharge   - Consider OT consult to assist with ADL evaluation and planning for discharge  - Provide patient education as appropriate  Outcome: Progressing  Goal: Maintains/Returns to pre admission functional level  Description: INTERVENTIONS:  - Perform AM-PAC 6 Click Basic Mobility/ Daily Activity assessment daily.  - Set and communicate daily mobility goal to care team and patient/family/caregiver.   - Collaborate with  rehabilitation services on mobility goals if consulted  - Perform Range of Motion 3 times a day.  - Reposition patient every 2 hours.  - Dangle patient 3 times a day  - Stand patient 3 times a day  - Ambulate patient 3 times a day  - Out of bed to chair 3 times a day   - Out of bed for meals 3 times a day  - Out of bed for toileting  - Record patient progress and toleration of activity level   Outcome: Progressing     Problem: DISCHARGE PLANNING  Goal: Discharge to home or other facility with appropriate resources  Description: INTERVENTIONS:  - Identify barriers to discharge w/patient and caregiver  - Arrange for needed discharge resources and transportation as appropriate  - Identify discharge learning needs (meds, wound care, etc.)  - Arrange for interpretive services to assist at discharge as needed  - Refer to Case Management Department for coordinating discharge planning if the patient needs post-hospital services based on physician/advanced practitioner order or complex needs related to functional status, cognitive ability, or social support system  Outcome: Progressing     Problem: Knowledge Deficit  Goal: Patient/family/caregiver demonstrates understanding of disease process, treatment plan, medications, and discharge instructions  Description: Complete learning assessment and assess knowledge base.  Interventions:  - Provide teaching at level of understanding  - Provide teaching via preferred learning methods  Outcome: Progressing     Problem: CARDIOVASCULAR - ADULT  Goal: Maintains optimal cardiac output and hemodynamic stability  Description: INTERVENTIONS:  - Monitor I/O, vital signs and rhythm  - Monitor for S/S and trends of decreased cardiac output  - Administer and titrate ordered vasoactive medications to optimize hemodynamic stability  - Assess quality of pulses, skin color and temperature  - Assess for signs of decreased coronary artery perfusion  - Instruct patient to report change in severity  of symptoms  Outcome: Progressing  Goal: Absence of cardiac dysrhythmias or at baseline rhythm  Description: INTERVENTIONS:  - Continuous cardiac monitoring, vital signs, obtain 12 lead EKG if ordered  - Administer antiarrhythmic and heart rate control medications as ordered  - Monitor electrolytes and administer replacement therapy as ordered  Outcome: Progressing     Problem: RESPIRATORY - ADULT  Goal: Achieves optimal ventilation and oxygenation  Description: INTERVENTIONS:  - Assess for changes in respiratory status  - Assess for changes in mentation and behavior  - Position to facilitate oxygenation and minimize respiratory effort  - Oxygen administered by appropriate delivery if ordered  - Initiate smoking cessation education as indicated  - Encourage broncho-pulmonary hygiene including cough, deep breathe, Incentive Spirometry  - Assess the need for suctioning and aspirate as needed  - Assess and instruct to report SOB or any respiratory difficulty  - Respiratory Therapy support as indicated  Outcome: Progressing     Problem: METABOLIC, FLUID AND ELECTROLYTES - ADULT  Goal: Electrolytes maintained within normal limits  Description: INTERVENTIONS:  - Monitor labs and assess patient for signs and symptoms of electrolyte imbalances  - Administer electrolyte replacement as ordered  - Monitor response to electrolyte replacements, including repeat lab results as appropriate  - Instruct patient on fluid and nutrition as appropriate  Outcome: Progressing  Goal: Fluid balance maintained  Description: INTERVENTIONS:  - Monitor labs   - Monitor I/O and WT  - Instruct patient on fluid and nutrition as appropriate  - Assess for signs & symptoms of volume excess or deficit  Outcome: Progressing  Goal: Glucose maintained within target range  Description: INTERVENTIONS:  - Monitor Blood Glucose as ordered  - Assess for signs and symptoms of hyperglycemia and hypoglycemia  - Administer ordered medications to maintain glucose  within target range  - Assess nutritional intake and initiate nutrition service referral as needed  Outcome: Progressing     Problem: HEMATOLOGIC - ADULT  Goal: Maintains hematologic stability  Description: INTERVENTIONS  - Assess for signs and symptoms of bleeding or hemorrhage  - Monitor labs  - Administer supportive blood products/factors as ordered and appropriate  Outcome: Progressing     Problem: Nutrition/Hydration-ADULT  Goal: Nutrient/Hydration intake appropriate for improving, restoring or maintaining nutritional needs  Description: Monitor and assess patient's nutrition/hydration status for malnutrition. Collaborate with interdisciplinary team and initiate plan and interventions as ordered.  Monitor patient's weight and dietary intake as ordered or per policy. Utilize nutrition screening tool and intervene as necessary. Determine patient's food preferences and provide high-protein, high-caloric foods as appropriate.     INTERVENTIONS:  - Monitor oral intake, urinary output, labs, and treatment plans  - Assess nutrition and hydration status and recommend course of action  - Evaluate amount of meals eaten  - Assist patient with eating if necessary   - Allow adequate time for meals  - Recommend/ encourage appropriate diets, oral nutritional supplements, and vitamin/mineral supplements  - Order, calculate, and assess calorie counts as needed  - Recommend, monitor, and adjust tube feedings and TPN/PPN based on assessed needs  - Assess need for intravenous fluids  - Provide specific nutrition/hydration education as appropriate  - Include patient/family/caregiver in decisions related to nutrition  Outcome: Progressing

## 2024-07-28 NOTE — PROGRESS NOTES
[facility]  Progress Note  Name: Bhavna Al I  MRN: [id number]  Unit/Bed#: ICU 06 I Date of Admission: 7/21/2024   Date of Service: 7/28/2024 I Hospital Day: 7    Assessment & Plan   * Unresponsive  Assessment & Plan  Received a DI score on this patient patient 27 July 2024  Acute encephalopathy versus worsening hypoactive delirium  Transferred to the ICU for closer monitoring  The exact etiology of her decreased mental status is not known- CT head was negative, electrolytes are acceptable  MRI is pending  Continue n.p.o. 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  Repeat echo does not show any valvular vegetation.  Blood cultures negative at 72 hours.    Plan  Continue doxycycline p.o until inflammatory markers stabilize/normalize in the outpatient setting. May take several weeks  ID following    Thrombocytopenia (HCC)  Assessment & Plan  Recent Labs     07/26/24  0924 07/27/24  0417 07/27/24  1523   * 131* 163       Results of prior workup, patient's platelet count is currently in the 130s  Patient was on heparin drip. Discontinued on 7/24 and started on Eliquis.  3 points on 4T score. Unlikely HIT.    Lab  investigations for thrombocytopenia:  D-dimer elevated at 1.11  Antithrombin III activity is decreased  APTT elevated 48  INR elevated 1.46  Direct Vasquez test is negative  DIC ruled out  Fibrin split products normal  Fibrinogen normal  Hemolysis?  LDH slightly elevated to 96  Reticulocyte count normal.  RI is 0.32 which is less than 2, this means hypoproliferation  Haptoglobin pending    Plan:  Pancytopenia with hemolysis noted previously- now improved  Ok to continue Heparin gtt for now- was on Eliquis but mental status precludes PO meds at present    Acute on chronic diastolic heart failure, LVEF 65%, LVIDd 3.3 cm, AHA Stage C  Assessment & Plan  Wt Readings from Last 3 Encounters:   07/27/24 96 kg (211 lb 10.3 oz)   07/11/24 108 kg (238 lb)   07/05/24 108 kg (238 lb 1.6 oz)     Pulmonary edema likely due to Afib RVR   At [place] she is on Lasix 20 mg daily.   Echo 7/22/2024 shows left ventricular ejection fraction is >75%. Systolic function is hyperdynamic. Unable to assess diastolic function due to the degree of mitral annular calcification.   Given 1 dose of IV Lasix 7/23 after which she diuresed only 300 ml.  Given Bumex 4 mg yesterday after which she made a UOP of 1.5 L.  Goal will be an even to slightly negative fluid balance    Plan:   Resume lasix 20 mg daily on discharge        Chronic pruritic rash in adult  Assessment & Plan  Diagnosis psoriasis proven by biopsy  Continue to use Lac-Hydrin lotion as needed    Persistent atrial fibrillation  Assessment & Plan  Home med is lopressor 25 mg BID  Dig loaded on 7/26-27  Check dig level  Cardiology consulted  Continuous cardiopulmonary monitoring    Ambulatory dysfunction  Assessment & Plan  Patient is bed bound, wheelchair bound and she is vamshi lift at facility at baseline.  No further PT needs.    Morbid obesity (HCC)  Assessment & Plan  Recommend decrease caloric intake and increase activity    Rheumatoid arthritis (HCC)  Assessment & Plan  Continue  Tylenol 650 mg p.o. every 6 hours  Currently on low dose methylprednisolone             Disposition: Stepdown Level 1    ICU Core Measures     A: Assess, Prevent, and Manage Pain Has pain been assessed? Yes  Need for changes to pain regimen? No   B: Both SAT/SAT  N/A   C: Choice of Sedation RASS Goal: N/A patient not on sedation  Need for changes to sedation or analgesia regimen? NA   D: Delirium CAM-ICU: Unable to perform secondary to Acute cognitive dysfunction   E: Early Mobility  Plan for early mobility? Yes   F: Family Engagement Plan for family engagement today? Yes       Antibiotic Review: Infectious disease consulted    Review of Invasive Devices:    [place] Plan: Voiding trial after improvement in ambulation         Prophylaxis:  VTE VTE covered by:  heparin (porcine), Intravenous, 8.1 Units/kg/hr at 07/27/24 2247  heparin (porcine), Intravenous  heparin (porcine), Intravenous       Stress Ulcer  covered bypantoprazole (PROTONIX) 40 mg tablet [[id number]] (Long-Term Med), pantoprazole (PROTONIX) injection 40 mg [[id number]]       Significant 24hr Events     24hr events: No events overnight     Subjective   Review of Systems: Review of Systems   Unable to perform ROS: Mental status change        Objective                            Vitals I/O      Most Recent Min/Max in 24hrs   Temp 98.9 °F (37.2 °C) Temp  Min: 98.9 °F (37.2 °C)  Max: 99.7 °F (37.6 °C)   Pulse 81 Pulse  Min: 76  Max: 128   Resp (!) 36 Resp  Min: 16  Max: 42   /74 BP  Min: 113/55  Max: 166/76   O2 Sat 97 % SpO2  Min: 93 %  Max: 98 %      Intake/Output Summary (Last 24 hours) at 7/28/2024 0539  Last data filed at 7/27/2024 2327  Gross per 24 hour   Intake 327.34 ml   Output 980 ml   Net -652.66 ml       Diet NPO    Invasive Monitoring           Physical Exam   Physical Exam  Eyes:      Pupils: Pupils are equal, round, and reactive to light.   Skin:     General: Skin is warm and dry.      Comments: Generalized skin sloughing   HENT:       Head: Normocephalic.      Mouth/Throat:      Mouth: Mucous membranes are dry.   Cardiovascular:      Rate and Rhythm: Normal rate.   Musculoskeletal:         General: Swelling present.   Abdominal:      Palpations: Abdomen is soft.      Tenderness: There is no abdominal tenderness.   Constitutional:       Appearance: She is ill-appearing.   Pulmonary:      Effort: Pulmonary effort is normal.      Breath sounds: Rales present.   Neurological:      Comments: Responds to voice, some verbalization but not following commands            Diagnostic Studies      EKG: Afib  Imaging:  I have personally reviewed pertinent reports.   and I have personally reviewed pertinent films in PACS     Medications:  Scheduled PRN   ammonium lactate, , BID  chlorhexidine, 15 mL, Q12H FAMILIA  digoxin, 250 mcg, Daily  doxycycline hyclate, 100 mg, Q12H FAMILIA  methylPREDNISolone sodium succinate, 4 mg, Q12H FAMILIA  metoprolol, 5 mg, Q6H  pantoprazole, 40 mg, Q24H FAMILIA      acetaminophen, 650 mg, Q4H PRN  heparin (porcine), 2,000 Units, Q6H PRN  heparin (porcine), 4,000 Units, Q6H PRN  levalbuterol, 1.25 mg, Q8H PRN  ondansetron, 4 mg, Q4H PRN  trimethobenzamide, 200 mg, Q6H PRN       Continuous    dextrose, 100 mL/hr, Last Rate: 100 mL/hr (07/28/24 0104)  heparin (porcine), 3-20 Units/kg/hr (Order-Specific), Last Rate: 8.1 Units/kg/hr (07/27/24 2247)         Labs:    CBC    Recent Labs     07/27/24  0417 07/27/24  1523   WBC 5.39 9.12   HGB 9.3* 9.6*   HCT 29.4* 31.2*   * 163     BMP    Recent Labs     07/27/24  0417 07/27/24  1523   SODIUM 150* 147   K 5.2 5.4*   * 113*   CO2 27 25   AGAP 11 9   BUN 28* 28*   CREATININE 1.93* 1.94*   CALCIUM 9.8 9.6       Coags    Recent Labs     07/27/24  1523 07/27/24  2113   INR 1.96*  --    PTT 33 >210*        Additional Electrolytes  Recent Labs     07/27/24  0417 07/28/24  0530   MG 2.0  --    CAIONIZED  --  1.03*          Blood Gas    No recent results  Recent Labs     07/27/24  0927   PHVEN 7.460*    [id number] 35.8*   PO2VEN 65.8*   [id number] 24.9   BEVEN 1.3   [id number] 89.6*    LFTs  No recent results    Infectious  No recent results  Glucose  Recent Labs     07/26/24  0924 07/27/24  0417 07/27/24  1523   GLUC 82 53* 109               AZIZA Arreaga

## 2024-07-28 NOTE — ASSESSMENT & PLAN NOTE
Wt Readings from Last 3 Encounters:   07/27/24 96 kg (211 lb 10.3 oz)   07/11/24 108 kg (238 lb)   07/05/24 108 kg (238 lb 1.6 oz)     Pulmonary edema likely due to Afib RVR   At [place] she is on Lasix 20 mg daily.   Echo 7/22/2024 shows left ventricular ejection fraction is >75%. Systolic function is hyperdynamic. Unable to assess diastolic function due to the degree of mitral annular calcification.   Given 1 dose of IV Lasix 7/23 after which she diuresed only 300 ml.  Given Bumex 4 mg yesterday after which she made a UOP of 1.5 L.  Goal will be an even to slightly negative fluid balance    Plan:   Resume lasix 20 mg daily on discharge

## 2024-07-28 NOTE — PLAN OF CARE
Problem: Prexisting or High Potential for Compromised Skin Integrity  Goal: Skin integrity is maintained or improved  Description: INTERVENTIONS:  - Identify patients at risk for skin breakdown  - Assess and monitor skin integrity  - Assess and monitor nutrition and hydration status  - Monitor labs   - Assess for incontinence   - Turn and reposition patient  - Assist with mobility/ambulation  - Relieve pressure over bony prominences  - Avoid friction and shearing  - Provide appropriate hygiene as needed including keeping skin clean and dry  - Evaluate need for skin moisturizer/barrier cream  - Collaborate with interdisciplinary team   - Patient/family teaching  - Consider wound care consult   Outcome: Progressing     Problem: PAIN - ADULT  Goal: Verbalizes/displays adequate comfort level or baseline comfort level  Description: Interventions:  - Encourage patient to monitor pain and request assistance  - Assess pain using appropriate pain scale  - Administer analgesics based on type and severity of pain and evaluate response  - Implement non-pharmacological measures as appropriate and evaluate response  - Consider cultural and social influences on pain and pain management  - Notify physician/advanced practitioner if interventions unsuccessful or patient reports new pain  Outcome: Progressing     Problem: INFECTION - ADULT  Goal: Absence or prevention of progression during hospitalization  Description: INTERVENTIONS:  - Assess and monitor for signs and symptoms of infection  - Monitor lab/diagnostic results  - Monitor all insertion sites, i.e. indwelling lines, tubes, and drains  - Monitor endotracheal if appropriate and nasal secretions for changes in amount and color  - Dubois appropriate cooling/warming therapies per order  - Administer medications as ordered  - Instruct and encourage patient and family to use good hand hygiene technique  - Identify and instruct in appropriate isolation precautions for  identified infection/condition  Outcome: Progressing     Problem: SAFETY ADULT  Goal: Patient will remain free of falls  Description: INTERVENTIONS:  - Educate patient/family on patient safety including physical limitations  - Instruct patient to call for assistance with activity   - Consult OT/PT to assist with strengthening/mobility   - Keep Call bell within reach  - Keep bed low and locked with side rails adjusted as appropriate  - Keep care items and personal belongings within reach  - Initiate and maintain comfort rounds  - Make Fall Risk Sign visible to staff  - Offer Toileting every  Hours, in advance of need  - Initiate/Maintain alarm  - Obtain necessary fall risk management equipment:   - Apply yellow socks and bracelet for high fall risk patients  - Consider moving patient to room near nurses station  Outcome: Progressing  Goal: Maintain or return to baseline ADL function  Description: INTERVENTIONS:  -  Assess patient's ability to carry out ADLs; assess patient's baseline for ADL function and identify physical deficits which impact ability to perform ADLs (bathing, care of mouth/teeth, toileting, grooming, dressing, etc.)  - Assess/evaluate cause of self-care deficits   - Assess range of motion  - Assess patient's mobility; develop plan if impaired  - Assess patient's need for assistive devices and provide as appropriate  - Encourage maximum independence but intervene and supervise when necessary  - Involve family in performance of ADLs  - Assess for home care needs following discharge   - Consider OT consult to assist with ADL evaluation and planning for discharge  - Provide patient education as appropriate  Outcome: Progressing  Goal: Maintains/Returns to pre admission functional level  Description: INTERVENTIONS:  - Perform AM-PAC 6 Click Basic Mobility/ Daily Activity assessment daily.  - Set and communicate daily mobility goal to care team and patient/family/caregiver.   - Collaborate with  rehabilitation services on mobility goals if consulted  - Perform Range of Motion  times a day.  - Reposition patient every  hours.  - Dangle patient  times a day  - Stand patient  times a day  - Ambulate patient  times a day  - Out of bed to chair  times a day   - Out of bed for meals  times a day  - Out of bed for toileting  - Record patient progress and toleration of activity level   Outcome: Progressing     Problem: DISCHARGE PLANNING  Goal: Discharge to home or other facility with appropriate resources  Description: INTERVENTIONS:  - Identify barriers to discharge w/patient and caregiver  - Arrange for needed discharge resources and transportation as appropriate  - Identify discharge learning needs (meds, wound care, etc.)  - Arrange for interpretive services to assist at discharge as needed  - Refer to Case Management Department for coordinating discharge planning if the patient needs post-hospital services based on physician/advanced practitioner order or complex needs related to functional status, cognitive ability, or social support system  Outcome: Progressing     Problem: Knowledge Deficit  Goal: Patient/family/caregiver demonstrates understanding of disease process, treatment plan, medications, and discharge instructions  Description: Complete learning assessment and assess knowledge base.  Interventions:  - Provide teaching at level of understanding  - Provide teaching via preferred learning methods  Outcome: Progressing     Problem: CARDIOVASCULAR - ADULT  Goal: Maintains optimal cardiac output and hemodynamic stability  Description: INTERVENTIONS:  - Monitor I/O, vital signs and rhythm  - Monitor for S/S and trends of decreased cardiac output  - Administer and titrate ordered vasoactive medications to optimize hemodynamic stability  - Assess quality of pulses, skin color and temperature  - Assess for signs of decreased coronary artery perfusion  - Instruct patient to report change in severity of  symptoms  Outcome: Progressing  Goal: Absence of cardiac dysrhythmias or at baseline rhythm  Description: INTERVENTIONS:  - Continuous cardiac monitoring, vital signs, obtain 12 lead EKG if ordered  - Administer antiarrhythmic and heart rate control medications as ordered  - Monitor electrolytes and administer replacement therapy as ordered  Outcome: Progressing     Problem: RESPIRATORY - ADULT  Goal: Achieves optimal ventilation and oxygenation  Description: INTERVENTIONS:  - Assess for changes in respiratory status  - Assess for changes in mentation and behavior  - Position to facilitate oxygenation and minimize respiratory effort  - Oxygen administered by appropriate delivery if ordered  - Initiate smoking cessation education as indicated  - Encourage broncho-pulmonary hygiene including cough, deep breathe, Incentive Spirometry  - Assess the need for suctioning and aspirate as needed  - Assess and instruct to report SOB or any respiratory difficulty  - Respiratory Therapy support as indicated  Outcome: Progressing     Problem: METABOLIC, FLUID AND ELECTROLYTES - ADULT  Goal: Electrolytes maintained within normal limits  Description: INTERVENTIONS:  - Monitor labs and assess patient for signs and symptoms of electrolyte imbalances  - Administer electrolyte replacement as ordered  - Monitor response to electrolyte replacements, including repeat lab results as appropriate  - Instruct patient on fluid and nutrition as appropriate  Outcome: Progressing  Goal: Fluid balance maintained  Description: INTERVENTIONS:  - Monitor labs   - Monitor I/O and WT  - Instruct patient on fluid and nutrition as appropriate  - Assess for signs & symptoms of volume excess or deficit  Outcome: Progressing  Goal: Glucose maintained within target range  Description: INTERVENTIONS:  - Monitor Blood Glucose as ordered  - Assess for signs and symptoms of hyperglycemia and hypoglycemia  - Administer ordered medications to maintain glucose  within target range  - Assess nutritional intake and initiate nutrition service referral as needed  Outcome: Progressing     Problem: HEMATOLOGIC - ADULT  Goal: Maintains hematologic stability  Description: INTERVENTIONS  - Assess for signs and symptoms of bleeding or hemorrhage  - Monitor labs  - Administer supportive blood products/factors as ordered and appropriate  Outcome: Progressing

## 2024-07-28 NOTE — PROGRESS NOTES
Cardiology Progress Note - Bhavna Al 74 y.o. female MRN: [id number]    Unit/Bed#: ICU 06 Encounter: [id number]      Assessment & Plan:    Unresponsive/lethargy  -Patient with significant hypoactive delirium  - VBG without significant hypercarbia  -CT head, TSH and ammonia within normal limits  - Patient still delirious, but more awake today   - Hypernatremia now resolved    Septic shock secondary to MRSA bacteremia  - Suspected T9-T10 osteomyelitis  - Blood cultures positive for MRSA on 6/6/2024  - Currently on antibiotics    Acute on chronic diastolic heart failure, LVEF 65%, LVIDd 3.3 cm, AHA Stage C  -TTE 7/22/2024 showed EF greater than 75%, mild TR, estimated PA pressure of 62 mmHg  - TTE 6/8/2024 showed EF 65%, low normal RV function, severe LA, technically difficult study  - Outpatient diuretic Rx furosemide 20 mg p.o. daily  - Current diuretic Rx Bumex 4 mg IV twice daily-> discontinued on 7/27    Persistent atrial fibrillation with intermittent RVR  -Anticoagulant Eliquis 5 mg twice daily prior to admission  - Currently on heparin drip  - Outpatient rate control Lopressor 25 mg twice daily  - Currently on Lopressor 5 mg every 6 hours    Elevated troponin  - Troponin trend 1374->1239->296  - Likely non-MI troponin elevation in setting of sepsis and acute kidney injury    Thrombocytopenia (HCC)    Rheumatoid arthritis (HCC)    Morbid obesity (HCC)    Ambulatory dysfunction  - Patient bedbound since February 2024, lives in nursing home    MRSA bacteremia    Acute kidney injury superimposed on chronic kidney disease  (HCC)    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  - Holding diuretics  - Guarded prognosis    Subjective:   Patient more awake today, but not talking or following commands.  Unable to obtain ROS secondary to AMS.    Objective:  "    Vitals: Blood pressure (!) 174/75, pulse 83, temperature 97.5 °F (36.4 °C), resp. rate (!) 28, height 5' 5\" (1.651 m), weight 96 kg (211 lb 10.3 oz), SpO2 96%., Body mass index is 35.22 kg/m².,   Orthostatic Blood Pressures      Flowsheet Row Most Recent Value   Blood Pressure 174/75 filed at 07/28/2024 1100   Patient Position - Orthostatic VS Lying filed at 07/27/2024 1600              Intake/Output Summary (Last 24 hours) at 7/28/2024 1145  Last data filed at 7/28/2024 0820  Gross per 24 hour   Intake 327.34 ml   Output 830 ml   Net -502.66 ml           Physical Exam:    GEN: Bhavna JANUSZ Al appears awake and is coughing, not following commands  HEENT: Dry oral mucosa, no scleral icterus, no conjunctival pallor  NECK: No significant JVD  HEART: Regular rate, irregularly irregular rhythm, no significant audible murmurs  LUNGS: Poor inspiratory effort, patient coughing frequently as well  ABDOMEN: normal bowel sounds, soft, no tenderness, no distention  EXTREMITIES: peripheral pulses normal; no lower extremity edema   NEURO: not following commands  SKIN: No lesions or rashes on exposed skin        Current Facility-Administered Medications:     acetaminophen (TYLENOL) tablet 650 mg, 650 mg, Oral, Q4H PRN, AZIZA Batista    ammonium lactate (LAC-HYDRIN) 12 % cream, , Topical, BID, AZIZA Batista, Given at 07/28/24 0814    chlorhexidine (PERIDEX) 0.12 % oral rinse 15 mL, 15 mL, Mouth/Throat, Q12H FAMILIA, AZIZA Batista, 15 mL at 07/28/24 0813    dextrose 5 % infusion, 100 mL/hr, Intravenous, Continuous, AZIZA Batista, Last Rate: 100 mL/hr at 07/28/24 0104, 100 mL/hr at 07/28/24 0104    digoxin (LANOXIN) injection 125 mcg, 125 mcg, Intravenous, Daily, Chuckie Cuevas MD    doxycycline (VIBRAMYCIN) 100 mg in sodium chloride 0.9 % 100 mL IVPB, 100 mg, Intravenous, Q12H, Colleen Gonzalez MD    heparin (porcine) 25,000 units in 0.45% NaCl 250 mL infusion (premix), 3-20 Units/kg/hr " (Order-Specific), Intravenous, Titrated, Chuckie Cuevas MD, Last Rate: 9.1 mL/hr at 07/28/24 0622, 10.1 Units/kg/hr at 07/28/24 0622    heparin (porcine) injection 2,000 Units, 2,000 Units, Intravenous, Q6H PRN, Chuckie Cuevas MD, 2,000 Units at 07/28/24 0651    heparin (porcine) injection 4,000 Units, 4,000 Units, Intravenous, Q6H PRN, Chuckie Cuevas MD    levalbuterol (XOPENEX) inhalation solution 1.25 mg, 1.25 mg, Nebulization, Q8H PRN, AZIZA Batista    methylPREDNISolone sodium succinate (Solu-MEDROL) injection 4 mg, 4 mg, Intravenous, Q12H FAMILIA, Eirc Ho, CRNP, 4 mg at 07/28/24 0812    metoprolol (LOPRESSOR) injection 5 mg, 5 mg, Intravenous, Q6H, Eric Ho, CRNP, 5 mg at 07/28/24 1141    ondansetron (ZOFRAN) injection 4 mg, 4 mg, Intravenous, Q4H PRN, Eric Ho, CRNP, 4 mg at 07/21/24 1241    pantoprazole (PROTONIX) injection 40 mg, 40 mg, Intravenous, Q24H FAMILIA, Eric Staleycsek, CRNP, 40 mg at 07/28/24 0812    trimethobenzamide (TIGAN) IM injection 200 mg, 200 mg, Intramuscular, Q6H PRN, Eric Ho CRNP    Labs & Results:    Lab Results   Component Value Date    CKTOTAL 42 07/05/2024    CKTOTAL 78 07/04/2024    CKTOTAL 149 07/03/2024    CKMB 3.8 07/10/2022    CKMBINDEX 1.1 07/10/2022       Lab Results   Component Value Date    GLUCOSE 123 11/21/2022    CALCIUM 9.2 07/28/2024    K 5.1 07/28/2024    CO2 26 07/28/2024     (H) 07/28/2024    BUN 31 (H) 07/28/2024    CREATININE 2.00 (H) 07/28/2024       Lab Results   Component Value Date    WBC 10.72 (H) 07/28/2024    HGB 9.4 (L) 07/28/2024    HCT 29.9 (L) 07/28/2024    MCV 89 07/28/2024     07/28/2024     Results from last 7 days   Lab Units 07/27/24  1523   INR  1.96*       Lab Results   Component Value Date    CHOL 196 10/06/2017    CHOL 177 03/23/2015     Lab Results   Component Value Date    HDL 41 (L) 03/11/2022    HDL 61 01/17/2020     Lab Results   Component Value Date    LDLCALC 66 03/11/2022     LDLCALC 95 01/17/2020     Lab Results   Component Value Date    TRIG 81 03/11/2022    TRIG 75 01/17/2020       Lab Results   Component Value Date    ALT 42 07/28/2024    AST 37 07/28/2024    ALKPHOS 74 07/28/2024         EKG personally reviewed by )Osvaldo Mcdowell MD. No acute changes   TELE: Patient remains in A-fib, no other significant arrhythmias

## 2024-07-29 ENCOUNTER — APPOINTMENT (INPATIENT)
Dept: NON INVASIVE DIAGNOSTICS | Facility: HOSPITAL | Age: 74
DRG: 871 | End: 2024-07-29
Payer: MEDICARE

## 2024-07-29 LAB
ANION GAP SERPL CALCULATED.3IONS-SCNC: 8 MMOL/L (ref 4–13)
APTT PPP: 55 SECONDS (ref 23–37)
BASOPHILS # BLD AUTO: 0.01 THOUSANDS/ÂΜL (ref 0–0.1)
BASOPHILS NFR BLD AUTO: 0 % (ref 0–1)
BUN SERPL-MCNC: 31 MG/DL (ref 5–25)
CA-I BLD-SCNC: 1.01 MMOL/L (ref 1.12–1.32)
CALCIUM SERPL-MCNC: 8.3 MG/DL (ref 8.4–10.2)
CHLORIDE SERPL-SCNC: 106 MMOL/L (ref 96–108)
CO2 SERPL-SCNC: 25 MMOL/L (ref 21–32)
CREAT SERPL-MCNC: 1.99 MG/DL (ref 0.6–1.3)
DIGOXIN SERPL-MCNC: 1.6 NG/ML (ref 0.8–2)
EOSINOPHIL # BLD AUTO: 0.1 THOUSAND/ÂΜL (ref 0–0.61)
EOSINOPHIL NFR BLD AUTO: 2 % (ref 0–6)
ERYTHROCYTE [DISTWIDTH] IN BLOOD BY AUTOMATED COUNT: 17.2 % (ref 11.6–15.1)
GFR SERPL CREATININE-BSD FRML MDRD: 23 ML/MIN/1.73SQ M
GLUCOSE SERPL-MCNC: 102 MG/DL (ref 65–140)
GLUCOSE SERPL-MCNC: 104 MG/DL (ref 65–140)
GLUCOSE SERPL-MCNC: 111 MG/DL (ref 65–140)
GLUCOSE SERPL-MCNC: 131 MG/DL (ref 65–140)
GLUCOSE SERPL-MCNC: 68 MG/DL (ref 65–140)
GLUCOSE SERPL-MCNC: 89 MG/DL (ref 65–140)
HCT VFR BLD AUTO: 25.5 % (ref 34.8–46.1)
HGB BLD-MCNC: 8.2 G/DL (ref 11.5–15.4)
IMM GRANULOCYTES # BLD AUTO: 0.07 THOUSAND/UL (ref 0–0.2)
IMM GRANULOCYTES NFR BLD AUTO: 1 % (ref 0–2)
LYMPHOCYTES # BLD AUTO: 0.77 THOUSANDS/ÂΜL (ref 0.6–4.47)
LYMPHOCYTES NFR BLD AUTO: 13 % (ref 14–44)
MCH RBC QN AUTO: 28.3 PG (ref 26.8–34.3)
MCHC RBC AUTO-ENTMCNC: 32.2 G/DL (ref 31.4–37.4)
MCV RBC AUTO: 88 FL (ref 82–98)
MONOCYTES # BLD AUTO: 0.39 THOUSAND/ÂΜL (ref 0.17–1.22)
MONOCYTES NFR BLD AUTO: 6 % (ref 4–12)
NEUTROPHILS # BLD AUTO: 4.84 THOUSANDS/ÂΜL (ref 1.85–7.62)
NEUTS SEG NFR BLD AUTO: 78 % (ref 43–75)
NRBC BLD AUTO-RTO: 1 /100 WBCS
PLATELET # BLD AUTO: 157 THOUSANDS/UL (ref 149–390)
POTASSIUM SERPL-SCNC: 4.1 MMOL/L (ref 3.5–5.3)
RBC # BLD AUTO: 2.9 MILLION/UL (ref 3.81–5.12)
SODIUM SERPL-SCNC: 139 MMOL/L (ref 135–147)
WBC # BLD AUTO: 6.18 THOUSAND/UL (ref 4.31–10.16)

## 2024-07-29 PROCEDURE — 92526 ORAL FUNCTION THERAPY: CPT

## 2024-07-29 PROCEDURE — 85730 THROMBOPLASTIN TIME PARTIAL: CPT | Performed by: NURSE PRACTITIONER

## 2024-07-29 PROCEDURE — 82948 REAGENT STRIP/BLOOD GLUCOSE: CPT

## 2024-07-29 PROCEDURE — 80162 ASSAY OF DIGOXIN TOTAL: CPT | Performed by: NURSE PRACTITIONER

## 2024-07-29 PROCEDURE — 99232 SBSQ HOSP IP/OBS MODERATE 35: CPT | Performed by: INTERNAL MEDICINE

## 2024-07-29 PROCEDURE — 82330 ASSAY OF CALCIUM: CPT

## 2024-07-29 PROCEDURE — 80048 BASIC METABOLIC PNL TOTAL CA: CPT | Performed by: NURSE PRACTITIONER

## 2024-07-29 PROCEDURE — 99233 SBSQ HOSP IP/OBS HIGH 50: CPT | Performed by: INTERNAL MEDICINE

## 2024-07-29 PROCEDURE — 85025 COMPLETE CBC W/AUTO DIFF WBC: CPT | Performed by: NURSE PRACTITIONER

## 2024-07-29 RX ORDER — METHYLPREDNISOLONE SODIUM SUCCINATE 40 MG/ML
4 INJECTION, POWDER, LYOPHILIZED, FOR SOLUTION INTRAMUSCULAR; INTRAVENOUS ONCE
Status: COMPLETED | OUTPATIENT
Start: 2024-07-29 | End: 2024-07-29

## 2024-07-29 RX ORDER — PREDNISONE 5 MG/1
5 TABLET ORAL 2 TIMES DAILY WITH MEALS
Status: DISCONTINUED | OUTPATIENT
Start: 2024-07-29 | End: 2024-08-02 | Stop reason: HOSPADM

## 2024-07-29 RX ORDER — DOXYCYCLINE HYCLATE 100 MG/1
100 CAPSULE ORAL EVERY 12 HOURS
Status: DISCONTINUED | OUTPATIENT
Start: 2024-07-30 | End: 2024-07-29

## 2024-07-29 RX ORDER — METOPROLOL TARTRATE 1 MG/ML
5 INJECTION, SOLUTION INTRAVENOUS ONCE
Status: COMPLETED | OUTPATIENT
Start: 2024-07-29 | End: 2024-07-29

## 2024-07-29 RX ORDER — DOXYCYCLINE HYCLATE 100 MG/1
100 CAPSULE ORAL 2 TIMES DAILY
Status: DISCONTINUED | OUTPATIENT
Start: 2024-07-30 | End: 2024-08-02 | Stop reason: HOSPADM

## 2024-07-29 RX ORDER — METHYLPREDNISOLONE SODIUM SUCCINATE 40 MG/ML
4 INJECTION, POWDER, LYOPHILIZED, FOR SOLUTION INTRAMUSCULAR; INTRAVENOUS DAILY
Status: DISCONTINUED | OUTPATIENT
Start: 2024-07-30 | End: 2024-07-29

## 2024-07-29 RX ORDER — CALCIUM GLUCONATE 20 MG/ML
2 INJECTION, SOLUTION INTRAVENOUS ONCE
Status: COMPLETED | OUTPATIENT
Start: 2024-07-29 | End: 2024-07-29

## 2024-07-29 RX ADMIN — DEXTROSE AND SODIUM CHLORIDE 75 ML/HR: 5; .45 INJECTION, SOLUTION INTRAVENOUS at 20:57

## 2024-07-29 RX ADMIN — DOXYCYCLINE 100 MG: 100 INJECTION, POWDER, LYOPHILIZED, FOR SOLUTION INTRAVENOUS at 01:02

## 2024-07-29 RX ADMIN — METOROPROLOL TARTRATE 5 MG: 5 INJECTION, SOLUTION INTRAVENOUS at 01:07

## 2024-07-29 RX ADMIN — APIXABAN 5 MG: 5 TABLET, FILM COATED ORAL at 12:19

## 2024-07-29 RX ADMIN — CALCIUM GLUCONATE 2 G: 20 INJECTION, SOLUTION INTRAVENOUS at 15:18

## 2024-07-29 RX ADMIN — CHLORHEXIDINE GLUCONATE 15 ML: 1.2 RINSE ORAL at 08:19

## 2024-07-29 RX ADMIN — Medication: at 17:22

## 2024-07-29 RX ADMIN — METOROPROLOL TARTRATE 5 MG: 5 INJECTION, SOLUTION INTRAVENOUS at 11:21

## 2024-07-29 RX ADMIN — PANTOPRAZOLE SODIUM 40 MG: 40 INJECTION, POWDER, FOR SOLUTION INTRAVENOUS at 08:19

## 2024-07-29 RX ADMIN — METOROPROLOL TARTRATE 5 MG: 5 INJECTION, SOLUTION INTRAVENOUS at 22:10

## 2024-07-29 RX ADMIN — HEPARIN SODIUM 2000 UNITS: 1000 INJECTION INTRAVENOUS; SUBCUTANEOUS at 05:51

## 2024-07-29 RX ADMIN — CHLORHEXIDINE GLUCONATE 15 ML: 1.2 RINSE ORAL at 22:10

## 2024-07-29 RX ADMIN — APIXABAN 5 MG: 5 TABLET, FILM COATED ORAL at 17:22

## 2024-07-29 RX ADMIN — METOROPROLOL TARTRATE 5 MG: 5 INJECTION, SOLUTION INTRAVENOUS at 07:59

## 2024-07-29 RX ADMIN — DOXYCYCLINE 100 MG: 100 INJECTION, POWDER, LYOPHILIZED, FOR SOLUTION INTRAVENOUS at 11:21

## 2024-07-29 RX ADMIN — DEXTROSE AND SODIUM CHLORIDE 100 ML/HR: 5; .45 INJECTION, SOLUTION INTRAVENOUS at 00:21

## 2024-07-29 RX ADMIN — METHYLPREDNISOLONE SODIUM SUCCINATE 4 MG: 40 INJECTION, POWDER, FOR SOLUTION INTRAMUSCULAR; INTRAVENOUS at 08:19

## 2024-07-29 RX ADMIN — METHYLPREDNISOLONE SODIUM SUCCINATE 4 MG: 40 INJECTION, POWDER, FOR SOLUTION INTRAMUSCULAR; INTRAVENOUS at 22:10

## 2024-07-29 RX ADMIN — Medication: at 08:20

## 2024-07-29 NOTE — ASSESSMENT & PLAN NOTE
Continue Tylenol 650 mg p.o. every 6 hours  Currently on 4 mg Solu-Medrol twice daily  Home regimen prednisone 5 mg twice daily

## 2024-07-29 NOTE — WOUND OSTOMY CARE
Patient transferred from med surg to ICU, new wound care consult placed. Wound care orders are in chart and communicated to CCU team that patient is on weekly follow list.         Delisa BALLARDN, RN, CWOCN

## 2024-07-29 NOTE — ASSESSMENT & PLAN NOTE
Home med is lopressor 25 mg BID  Dig loaded on 7/26-27  Digoxin levels checked this morning-within normal limits  Currently patient on IV Lopressor 5 mg every 6 hours, also digoxin 125 mcg daily  On heparin drip as patient is n.p.o. and unable to take Eliquis  Cardiology team on board-appreciate recommendations  Continuous cardiopulmonary monitoring

## 2024-07-29 NOTE — ASSESSMENT & PLAN NOTE
Troponin trend 1374->1239->296  Likely non-MI troponin elevation in setting of sepsis and acute kidney injury

## 2024-07-29 NOTE — ASSESSMENT & PLAN NOTE
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  Repeat echo does not show any valvular vegetation.  Blood cultures negative at 72 hours.    Plan  Continue doxycycline p.o until inflammatory markers stabilize/normalize in the outpatient setting. May take several weeks.  Patient has not received the oral dosing as patient has been NPO.  Will speech and swallow evaluation today if patient passes will restart patient on doxycycline  ID following

## 2024-07-29 NOTE — ASSESSMENT & PLAN NOTE
Recent Labs     07/27/24  1523 07/28/24  0530 07/29/24  0436    187 157       Results of prior workup, patient's platelet count is currently in the 130s  Patient was on heparin drip. Discontinued on 7/24 and started on Eliquis.  3 points on 4T score. Unlikely HIT.    Lab investigations for thrombocytopenia:  D-dimer elevated at 1.11  Antithrombin III activity is decreased  APTT elevated 48  INR elevated 1.46  Direct Vasquez test is negative  DIC ruled out  Fibrin split products normal  Fibrinogen normal  Hemolysis?  LDH slightly elevated to 96  Reticulocyte count normal.  RI is 0.32 which is less than 2, this means hypoproliferation  Haptoglobin pending    Plan:  Pancytopenia with hemolysis noted previously- now improved  Ok to continue Heparin gtt for now- was on Eliquis but mental status precludes PO meds at present

## 2024-07-29 NOTE — CASE MANAGEMENT
Case Management Discharge Planning Note    Patient name Bhavna Al  Location ICU 06/ICU 06 MRN [id number]  : 1950 Date 2024       Current Admission Date: 2024  Current Admission Diagnosis:encephalopathy   Patient Active Problem List    Diagnosis Date Noted Date Diagnosed    encephalopathy 2024     Elevated troponin 2024     Sepsis ([facility]) 2024     Acute kidney injury superimposed on chronic kidney disease  (HCC) 2024     Hypokalemia 2024     Chronic pruritic rash in adult 2024     Secondary adrenal insufficiency ([facility]) 2024     Pressure ulcer of left buttock, stage 3 ([facility]) 2024     Acute osteomyelitis of thoracic spine ([facility]) 2024     MRSA bacteremia 2024     Hypotension 2024     Dysphagia 2024     Deep tissue injury 2024     Hypernatremia 2024     Localized swelling on left hand 2023     Stage 3 chronic kidney disease ([facility]) 2023     Febrile illness 2023     Dermatitis associated with incontinence 2023     Right shoulder pain 12/15/2022     Abnormal urinalysis 2022     Ambulatory dysfunction 2022     Hyperuricemia 2022     Acute metabolic encephalopathy 2022     Acute on chronic diastolic heart failure, LVEF 65%, LVIDd 3.3 cm, AHA Stage C 2022     Acute bronchitis 2022     Assistance needed with transportation 09/15/2022     Abnormal CT of the chest 2022     Gram-positive cocci bacteremia 2022     Psoriasis 2022     COVID-19 2022     Shock ([facility]) 01/10/2022     Persistent atrial fibrillation 01/10/2022     Hyperparathyroidism ([facility]) 2021     Murmur, cardiac 2021     BPPV (benign paroxysmal positional vertigo) 2018     Seasonal allergic rhinitis due to pollen 2018     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     Osteoporosis 2016     Leukopenia 2016     Thrombocytopenia ([facility]) 2016      Rheumatoid arthritis (HCC) 08/23/2016     GERD (gastroesophageal reflux disease) 08/23/2016     Sarcoid 08/23/2016     Morbid obesity (HCC) 07/12/2016     Vitamin D deficiency 07/16/2015     Essential hypertension 12/04/2014     Lumbar radiculopathy 12/04/2014       LOS (days): 8  Geometric Mean LOS (GMLOS) (days): 5.1  Days to GMLOS:-3     OBJECTIVE:  Risk of Unplanned Readmission Score: 40.53         Current admission status: Inpatient   Preferred Pharmacy:   [id number] - [place], [place] - [address]  [address]  [place] [address]  Phone: [phone] Fax: [phone]    [facility] - [address] [address]  Phone: [phone] Fax: [phone]    Primary Care Provider: Nya Taveras DO    Primary Insurance: MEDICARE  Secondary Insurance: [facility]    DISCHARGE DETAILS:    Contacts  Patient Contacts: Rhoda Al (Dtr)  Relationship to Patient:: Family  Contact Method: Phone  Phone Number: [phone]  Reason/Outcome: Discharge Planning, Continuity of Care, Emergency Contact    Requested Home Health Care         Is the patient interested in HHC at discharge?: No    DME Referral Provided  Referral made for DME?: No        Additional Comments: Pt was planned for d/c tomorrow at noon to [place]; however has transferred to ICU. Patient is scheduled r=for doppler of left arm, speech re-evaluation and ICU providers to have GOC discussion with patient's daughter. CM department will continue to follow for disposition.

## 2024-07-29 NOTE — PROGRESS NOTES
Progress Note - Infectious Disease   Bhavna Al 74 y.o. female MRN: [id number]  Unit/Bed#: ICU 06 Encounter: [id number]    Impression/Plan:  1. Thoracic vertebral osteomyelitis.  Imaging of the spine in the setting of back pain revealed T9-10 discitis/vertebral osteomyelitis. Likely staphylococcal based on the previous blood culture results.  Sedimentation rate and CRP have been decreasing.  Pain has been improving. The patient has completed IV antibiotic treatment and has transitioned to PO Doxycycline. She is planned to remain on oral antibiotic treatment until her sed rate normalizes vs plateaus. Patient is planned for outpatient ID follow up after discharge, discussed with randee at the bedside who accompanies patient to outpatient appointments.  -continue PO Doxycycline, 100mg BID  -anticipate ongoing oral antibiotic treatment until her sed rate normalizes vs plateaus  -CBCD and creatinine every other week while on oral antibiotic  -monitor vitals  -patient to follow up in the outpatient ID office after discharge, appointment information placed in the discharge planning and randee Cali is aware    2. Recent Shock.  No infectious etiology found despite very extensive evaluation clinically, microbiologically, and radiographically.  Cultures were all negative.  Patient is weaned off vasopressor support. Consideration for the possibly adrenal insufficiency on this patient with a chronic low-dose steroids being used. Procalcitonin level was quite high, but in the setting of acute kidney injury this is of unknown significance.  CT chest abdomen pelvis without a definitive source.  She has now weaned off vasopressor support.   -no indication for antibiotic for this issue  -ongoing oral antibiotic treatment for spinal osteomyelitis as above  -monitor CBCD and BMP  -monitor vitals  -supportive care     3. Recent MRSA bacteremia.  Undefined primary source but with a chronic rash with some areas of skin breakdown which  could be playing a role.  Complicated by thoracic vertebral osteomyelitis as above.  Patient previously cleared her bacteremia and a transthoracic echocardiogram that was without valvular vegetations.  CT of the chest abdomen pelvis without any new complications. Repeat transthoracic echocardiogram without valvular vegetation appreciated.  Patient has now completed appropriate antibiotic for this issue. No indication for ongoing treatment of this issue at this time.  -no indication for ongoing antibiotic for this issue      4. Acute kidney injury. This can impact antibiotic dosing. Most likely prerenal issue.  Consideration for the possibility of ATN.  Creatinine has improved, is 1.99 this morning.  -monitor creatinine  -dose adjust antibiotic for renal function as needed  -avoid nephrotoxins  -volume management per critical care team     5. Rheumatoid arthritis.  On Plaquenil and prednisone. Immunosuppression is risk factor for infection.    6. Acute mental status change. Patient returned to ICU over the weekend. Concern for delirium. No new fevers to suggest infectious etiology. Goals of care conversation with family, patient will remain level 1 until they discuss further with palliative. While her mental status has improved some, she will need new speech therapy evaluation before taking oral intake again as patient does not appears strong enough for oral intake at this time.   -serial neuro exams  -monitor mood and mental status  -supportive care  -follow up palliative care consult    Infectious disease team will follow peripherally.   Above plan was discussed in detail with Karely jeff, at the bedside.  Above plan was discussed in detail with critical care team regarding ongoing antibiotic for spinal osteomyelitis.    Antibiotics:  Doxycycline 5  Antibiotics 9    Subjective:  Patient's niece is at the bedside and reports patient seems much better this morning in regards to being awake and speaking. She  "remains concerned that patient isn't eating. She feels patient's edema is much less than before. Patient awake but minimally verbal with her responses during my visit. Did nod \"no\" when asking about pain in her chest and abdomen, and difficulty breathing. Patient offered no other symptoms.    Objective:  Vitals:  Temp:  [97.5 °F (36.4 °C)-98.7 °F (37.1 °C)] 98.7 °F (37.1 °C)  HR:  [60-84] 76  Resp:  [18-29] 19  BP: (114-174)/() 162/62  SpO2:  [96 %-99 %] 98 %  Temp (24hrs), Av.2 °F (36.8 °C), Min:97.5 °F (36.4 °C), Max:98.7 °F (37.1 °C)  Current: Temperature: 98.7 °F (37.1 °C)    Physical Exam:   General Appearance:  Alert but minimally interactive. She appears chronically ill and debilitated. She appears comfortable sitting up in bed.   Throat: Oropharynx moist without lesions.    Lungs:   Clear to auscultation bilaterally; no wheezes, rhonchi or rales; respirations unlabored on room air.   Heart:  Irregular; no murmur, rub or gallop.   Abdomen:   Soft, non-tender, non-distended, positive bowel sounds.     Extremities: No clubbing or cyanosis; +peripheral edema, feet L>R.   Skin: No new rashes noted on exposed skin. Overall dry with flaky patches unchanged.     Labs, Imaging, & Other studies:   All pertinent labs and imaging studies were personally reviewed  Results from last 7 days   Lab Units 24  0436 24  0530 24  1523   WBC Thousand/uL 6.18 10.72* 9.12   HEMOGLOBIN g/dL 8.2* 9.4* 9.6*   PLATELETS Thousands/uL 157 187 163     Results from last 7 days   Lab Units 24  0436 24  0530 24  1553 24  0436 24  1550 24  0436   POTASSIUM mmol/L 4.1 5.1   < > 3.2*   < > 3.1*   CHLORIDE mmol/L 106 109*   < > 112*   < > 110*   CO2 mmol/L 25 26   < > 25   < > 24   BUN mg/dL 31* 31*   < > 36*   < > 35*   CREATININE mg/dL 1.99* 2.00*   < > 2.52*   < > 2.65*   EGFR ml/min/1.73sq m 23 23   < > 17   < > 16   CALCIUM mg/dL 8.3* 9.2   < > 9.0   < > 8.6   AST U/L  --  37  " --  41*  --  39   ALT U/L  --  42  --  37  --  30   ALK PHOS U/L  --  74  --  46  --  43    < > = values in this interval not displayed.     Results from last 7 days   Lab Units 07/22/24  1025   URINE CULTURE  No Growth <1000 cfu/mL

## 2024-07-29 NOTE — PROGRESS NOTES
Cardiology Progress Note   MD Osvaldo Reyes MD, FACC  Kostas Bruce DO, [facility]  MD Coby Dumont DO, [facility]  Kelton Rodriguez DO, [facility]  ----------------------------------------------------------------  [address] [address]    Bhavna Al 74 y.o. female MRN: [id number]  Unit/Bed#: ICU 06 Encounter: [id number]      ASSESSMENT:   Septic shock secondary to MRSA bacteremia from suspected T9-T10 osteomyelitis  Persistent atrial fibrillation with intermittent RVR  Acute kidney injury on CKD  Nonischemic troponin elevation secondary to sepsis, acute kidney injury  Acquired adrenal insufficiency  Acute on chronic HFpEF  LVEF > 75%, severe MAC, trace MR, mild TR with PASP 65 mmHg, he has July 2024  Severe rheumatoid arthritis  Severe ambulatory dysfunction  Psoriasis    PLAN:  Renal function remained stable on diuretic drip  Patient appears to be at her dry weight  Will defer IV fluids to primary team, but unclear if the patient requires IVFs  Continue IV metoprolol for rate control, but can transition to metoprolol 25 mg every 12 hours with holding parameters if patient taking orals  Eliquis for thromboembolic prophylaxis and heparin drip if patient cannot take orals  Keep potassium greater than 4 and magnesium greater than 2  Strict I's/O's and daily weights  Steroids as per primary team  Cognitively appears to be improving  Guarded long-term prognosis    Signed: Kostas Bruce DO, [facility]CHA[facility], LINDSEY MATHEWS, FACP      History of Present Illness:  Patient seen and examined.  No acute events overnight.  Denies chest pain, pressure, tightness or squeezing.  Denies lightheadedness, dizziness or palpitations.  Denies lower extremity swelling, orthopnea or paroxysmal nocturnal dyspnea.      Review of Systems:  Review of Systems   Constitutional: Negative for decreased appetite, fever, weight gain and weight loss.   HENT:  Negative for congestion and sore  throat.    Eyes:  Negative for visual disturbance.   Cardiovascular:  Negative for chest pain, dyspnea on exertion, leg swelling, near-syncope and palpitations.   Respiratory:  Negative for cough and shortness of breath.    Hematologic/Lymphatic: Negative for bleeding problem.   Skin:  Negative for rash.   Musculoskeletal:  Negative for myalgias and neck pain.   Gastrointestinal:  Negative for abdominal pain and nausea.   Neurological:  Negative for light-headedness and weakness.   Psychiatric/Behavioral:  Negative for depression.         Allergies   Allergen Reactions    Shellfish-Derived Products - Food Allergy Itching    Methotrexate Derivatives     Amoxicillin Rash    Ampicillin-Sulbactam Sodium Rash       No current facility-administered medications on file prior to encounter.     Current Outpatient Medications on File Prior to Encounter   Medication Sig    ammonium lactate (LAC-HYDRIN) 12 % cream Apply topically 2 (two) times a day    acetaminophen (TYLENOL) 325 mg tablet Take 2 tablets (650 mg total) by mouth every 4 (four) hours as needed for mild pain, headaches or fever. (Patient taking differently: Take 650 mg by mouth every 6 (six) hours as needed for mild pain, headaches or fever)    albuterol (Ventolin HFA) 90 mcg/act inhaler Inhale 2 puffs every 6 (six) hours as needed for wheezing    alendronate (FOSAMAX) 70 mg tablet Take 70 mg by mouth every 7 days Do not start before July 28, 2024.    apixaban (ELIQUIS) 5 mg Take 1 tablet (5 mg total) by mouth 2 (two) times a day    bisacodyl (FLEET) 10 MG/30ML ENEM Insert 10 mg into the rectum once (Patient not taking: Reported on 7/17/2024)    cholecalciferol (VITAMIN D3) 1,000 units tablet Take 1 tablet (1,000 Units total) by mouth daily    clobetasol (TEMOVATE) 0.05 % cream Apply topically 2 (two) times a day    furosemide (LASIX) 20 mg tablet Take 1 tablet (20 mg total) by mouth daily    hydroxychloroquine (PLAQUENIL) 200 mg tablet Take 1 tablet (200 mg  total) by mouth 2 (two) times a day    lidocaine (LIDODERM) 5 % Apply 2 patches topically daily Remove & Discard patch within 12 hours or as directed by MD Do not start before December 20, 2022. (Patient taking differently: Apply 2 patches topically daily Remove & Discard patch within 12 hours or as directed by MD)    metoprolol tartrate (LOPRESSOR) 25 mg tablet Take 1 tablet (25 mg total) by mouth every 12 (twelve) hours    miconazole 2 % cream Apply topically 2 (two) times a day    nystatin (MYCOSTATIN) powder Apply topically 2 (two) times a day    pantoprazole (PROTONIX) 40 mg tablet Take 1 tablet (40 mg total) by mouth daily in the early morning    predniSONE 5 mg tablet Take 1 tablet (5 mg total) by mouth 2 (two) times a day with meals Do not start before June 5, 2023.    triamcinolone (KENALOG) 0.1 % cream Apply topically 2 (two) times a day Apply to BUE and BLE topically everyday and evening shift for psoriasis    Zinc 50 MG TABS Take 1 tablet by mouth in the morning        Current Facility-Administered Medications   Medication Dose Route Frequency Provider Last Rate    acetaminophen  650 mg Oral Q4H PRN AZIZA Batista      ammonium lactate   Topical BID AZIZA Batista      apixaban  5 mg Oral BID Navjot Cline MD      chlorhexidine  15 mL Mouth/Throat Q12H [facility] AZIZA Batista      dextrose 5 % and sodium chloride 0.45 %  100 mL/hr Intravenous Continuous Colleen Gonzalez  mL/hr (07/29/24 0021)    [START ON 7/30/2024] doxycycline hyclate  100 mg Oral Q12H Navjot Cline MD      levalbuterol  1.25 mg Nebulization Q8H PRN AZIZA Batista      methylPREDNISolone sodium succinate  4 mg Intravenous Q12H [facility] AZIZA Batista      metoprolol  5 mg Intravenous Q6H AZIZA Batista      ondansetron  4 mg Intravenous Q4H PRN AZIZA Batista      pantoprazole  40 mg Intravenous Q24H [facility] AZIZA Batista      phenol  1 spray  "Mouth/Throat Q2H PRN Colleen Gonzalez MD      trimethobenzamide  200 mg Intramuscular Q6H PRN AZIZA Batista         dextrose 5 % and sodium chloride 0.45 %, 100 mL/hr, Last Rate: 100 mL/hr (07/29/24 0021)        Vitals:    07/29/24 1200 07/29/24 1300 07/29/24 1305 07/29/24 1400   BP: 141/72  140/65    BP Location: Right arm      Pulse: 68 68 65 74   Resp: 18 (!) 43     Temp: 98.8 °F (37.1 °C)      TempSrc: Oral      SpO2: 98% 99% 99% 98%   Weight:       Height:         Body mass index is 35.37 kg/m².      Intake/Output Summary (Last 24 hours) at 7/29/2024 1404  Last data filed at 7/29/2024 1200  Gross per 24 hour   Intake 2724.08 ml   Output 850 ml   Net 1874.08 ml       Weight change: 0.4 kg (14.1 oz)    PHYSICAL EXAMINATION:  Gen: Awake, confused, NAD  Head/eyes: AT/NC, pupils equal and round, Anicteric  ENT: mmm  Neck: Supple, No elevated JVP, trachea midline  Resp: CTA bilaterally no w/r/r  CV: irreg irreg +S1, S2, No m/r/g  Abd: Soft, NT/ND + BS  Ext: Trivial pitting LE edema bilaterally  Neuro: Does not follow commands, but moves all extermities  Psych: Blunted affect, normal mood, pleasant attitude, non-combative  Skin: warm; no rash, erythema or venous stasis changes on exposed skin    Lab Results:  Results from last 7 days   Lab Units 07/29/24  0436   WBC Thousand/uL 6.18   HEMOGLOBIN g/dL 8.2*   HEMATOCRIT % 25.5*   PLATELETS Thousands/uL 157     Results from last 7 days   Lab Units 07/29/24  0436 07/28/24  0530   POTASSIUM mmol/L 4.1 5.1   CHLORIDE mmol/L 106 109*   CO2 mmol/L 25 26   BUN mg/dL 31* 31*   CREATININE mg/dL 1.99* 2.00*   CALCIUM mg/dL 8.3* 9.2   ALK PHOS U/L  --  74   ALT U/L  --  42   AST U/L  --  37     No results found for: \"TROPONINT\"            Results from last 7 days   Lab Units 07/27/24  1523   INR  1.96*       Tele: AF 90s to 110s    This note was completed in part utilizing [employer] Software.  Grammatical errors, random word insertions, spelling mistakes, and " incomplete sentences may be an occasional consequence of this system secondary to software limitations, ambient noise, and hardware issues.  If you have any questions or concerns about the content, text, or information contained within the body of this dictation, please contact the provider for clarification.

## 2024-07-29 NOTE — ASSESSMENT & PLAN NOTE
Wt Readings from Last 3 Encounters:   07/29/24 96.4 kg (212 lb 8.4 oz)   07/11/24 108 kg (238 lb)   07/05/24 108 kg (238 lb 1.6 oz)     Pulmonary edema likely due to Afib RVR   At [place] she is on Lasix 20 mg daily.   Echo 7/22/2024 shows left ventricular ejection fraction is >75%. Systolic function is hyperdynamic. Unable to assess diastolic function due to the degree of mitral annular calcification.   Given 1 dose of IV Lasix 7/23 after which she diuresed only 300 ml.  Given Bumex 4 mg 7/28  after which she made a UOP of 1.5 L.  Further dosing of IV diuretic was held yesterday per cardiology recommendation    Plan:   Resume lasix 20 mg daily on discharge  Appreciate cardiology team's recommendations

## 2024-07-29 NOTE — PROGRESS NOTES
[facility]  Progress Note  Name: Bhavna Al I  MRN: [id number]  Unit/Bed#: ICU 06 I Date of Admission: 7/21/2024   Date of Service: 7/29/2024 I Hospital Day: 8    Assessment & Plan   * encephalopathy  Assessment & Plan  Received a DI score on this patient patient 27 July 2024  Acute encephalopathy versus worsening hypoactive delirium  Transferred to the ICU for closer monitoring  The exact etiology of her decreased mental status is not known- CT head was negative, electrolytes are acceptable  MRI is pending  7/29-send is more awake and alert, answering questions but drowsy  Ammonia levels within normal limits, VBG did not show hypercarbia  Patient has been n.p.o., will consider speech evaluation and reintroduction of diet today as patient is more awake and alert    Persistent atrial fibrillation  Assessment & Plan  Home med is lopressor 25 mg BID  Dig loaded on 7/26-27  Digoxin levels checked this morning-within normal limits  Currently patient on IV Lopressor 5 mg every 6 hours, also digoxin 125 mcg daily  On heparin drip as patient is n.p.o. 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  Repeat echo does not show any valvular vegetation.  Blood cultures negative at 72 hours.    Plan  Continue doxycycline p.o until inflammatory markers stabilize/normalize in the outpatient setting. May take several weeks.  Patient has not received the oral dosing as patient has been NPO.  Will speech and swallow evaluation today if patient passes will restart patient on  doxycycline  ID following    Elevated troponin  Assessment & Plan  Troponin trend 1374->1239->296  Likely non-MI troponin elevation in setting of sepsis and acute kidney injury    Chronic pruritic rash in adult  Assessment & Plan  Diagnosis psoriasis proven by biopsy  Continue to use Lac-Hydrin lotion as needed    Acute kidney injury superimposed on chronic kidney disease  (HCC)  Assessment & Plan  Recent Labs     07/27/24  1523 07/28/24  0530 07/29/24  0436   CREATININE 1.94* 2.00* 1.99*   EGFR 24 23 23       CKD stage 3a  Creatinine baseline is 1-1.2 prior to this admission  Monitor kidney indices  Avoid nephrotoxic medications  Creatinine currently in the range of 1.9-2  May be new baseline    Ambulatory dysfunction  Assessment & Plan  Patient is bed bound, wheelchair bound and she is vamshi lift at facility at baseline.  No further PT needs.    Acute on chronic diastolic heart failure, LVEF 65%, LVIDd 3.3 cm, AHA Stage C  Assessment & Plan  Wt Readings from Last 3 Encounters:   07/29/24 96.4 kg (212 lb 8.4 oz)   07/11/24 108 kg (238 lb)   07/05/24 108 kg (238 lb 1.6 oz)     Pulmonary edema likely due to Afib RVR   At [place] she is on Lasix 20 mg daily.   Echo 7/22/2024 shows left ventricular ejection fraction is >75%. Systolic function is hyperdynamic. Unable to assess diastolic function due to the degree of mitral annular calcification.   Given 1 dose of IV Lasix 7/23 after which she diuresed only 300 ml.  Given Bumex 4 mg 7/28  after which she made a UOP of 1.5 L.  Further dosing of IV diuretic was held yesterday per cardiology recommendation    Plan:   Resume lasix 20 mg daily on discharge  Appreciate cardiology team's recommendations        Morbid obesity (HCC)  Assessment & Plan  Recommend decrease caloric intake and increase activity    Rheumatoid arthritis (HCC)  Assessment & Plan  Continue Tylenol 650 mg p.o. every 6 hours  Currently on 4 mg Solu-Medrol twice daily  Home regimen prednisone 5 mg twice  daily    Thrombocytopenia (HCC)  Assessment & Plan  Recent Labs     07/27/24  1523 07/28/24  0530 07/29/24  0436    187 157       Results of prior workup, patient's platelet count is currently in the 130s  Patient was on heparin drip. Discontinued on 7/24 and started on Eliquis.  3 points on 4T score. Unlikely HIT.    Lab investigations for thrombocytopenia:  D-dimer elevated at 1.11  Antithrombin III activity is decreased  APTT elevated 48  INR elevated 1.46  Direct Vasquez test is negative  DIC ruled out  Fibrin split products normal  Fibrinogen normal  Hemolysis?  LDH slightly elevated to 96  Reticulocyte count normal.  RI is 0.32 which is less than 2, this means hypoproliferation  Haptoglobin pending    Plan:  Pancytopenia with hemolysis noted previously- now improved  Ok to continue Heparin gtt for now- was on Eliquis but mental status precludes PO meds at present             Disposition: Stepdown Level 1    ICU Core Measures     A: Assess, Prevent, and Manage Pain Has pain been assessed? Yes  Need for changes to pain regimen? No   B: Both SAT/SAT  N/A   C: Choice of Sedation RASS Goal: -1 Drowsy or 0 Alert and Calm  Need for changes to sedation or analgesia regimen? No   D: Delirium CAM-ICU:    E: Early Mobility  Plan for early mobility? Yes   F: Family Engagement Plan for family engagement today? Yes       Antibiotic Review: Patient on appropriate coverage based on culture data.     Review of Invasive Devices:    Corey Plan: Voiding trial after improvement in ambulation         Prophylaxis:  VTE VTE covered by:  heparin (porcine), Intravenous, 18.1 Units/kg/hr at 07/29/24 0528  heparin (porcine), Intravenous, 2,000 Units at 07/29/24 0551  heparin (porcine), Intravenous, 4,000 Units at 07/28/24 2107       Stress Ulcer  covered bypantoprazole (PROTONIX) 40 mg tablet [[id number]] (Long-Term Med), pantoprazole (PROTONIX) injection 40 mg [[id number]]         Significant 24hr Events     24hr events: No  significant overnight events.  Patient more awake and alert this morning responding to questions.  However very drowsy.     Subjective   This morning, patient was seen and examined at bedside.  Patient was lying comfortably on the bed, more awake and alert.  Answering questions.  She was able to tell me the year and her name.  While trying to examine her she was withdrawing her lower extremities from pain  Review of Systems: See HPI for Review of Systems     Objective                            Vitals I/O      Most Recent Min/Max in 24hrs   Temp 98.7 °F (37.1 °C) Temp  Min: 98.2 °F (36.8 °C)  Max: 98.7 °F (37.1 °C)   Pulse 76 Pulse  Min: 60  Max: 84   Resp 19 Resp  Min: 19  Max: 29   /62 BP  Min: 114/89  Max: 174/75   O2 Sat 98 % SpO2  Min: 96 %  Max: 99 %      Intake/Output Summary (Last 24 hours) at 7/29/2024 0819  Last data filed at 7/29/2024 0102  Gross per 24 hour   Intake 1992.46 ml   Output 860 ml   Net 1132.46 ml       Diet NPO    Invasive Monitoring           Physical Exam   Physical Exam  Eyes:      Extraocular Movements: Extraocular movements intact.      Conjunctiva/sclera: Conjunctivae normal.   Skin:     General: Skin is warm.      Comments: Wounds on bilateral lower extremities   Chronic skin sloughing   HENT:      Mouth/Throat:      Mouth: Mucous membranes are moist.   Cardiovascular:      Rate and Rhythm: Normal rate and regular rhythm.   Musculoskeletal:         General: Swelling present.   Abdominal:      Palpations: Abdomen is soft.   Constitutional:       Appearance: She is ill-appearing.   Pulmonary:      Effort: Pulmonary effort is normal.      Breath sounds: Normal breath sounds.   Neurological:      Mental Status: She is calm.      Comments: Alert but fatigued   Genitourinary/Anorectal:  Corey present.          Diagnostic Studies      EKG: no new ekgs  Imaging: ct head without contrast I have personally reviewed pertinent reports.       Medications:  Scheduled PRN   ammonium lactate, ,  BID  chlorhexidine, 15 mL, Q12H FAMILIA  doxycycline, 100 mg, Q12H  methylPREDNISolone sodium succinate, 4 mg, Q12H FAMILIA  metoprolol, 5 mg, Q6H  pantoprazole, 40 mg, Q24H FAMILIA      acetaminophen, 650 mg, Q4H PRN  heparin (porcine), 2,000 Units, Q6H PRN  heparin (porcine), 4,000 Units, Q6H PRN  levalbuterol, 1.25 mg, Q8H PRN  ondansetron, 4 mg, Q4H PRN  phenol, 1 spray, Q2H PRN  trimethobenzamide, 200 mg, Q6H PRN       Continuous    dextrose 5 % and sodium chloride 0.45 %, 100 mL/hr, Last Rate: 100 mL/hr (07/29/24 0021)  heparin (porcine), 3-20 Units/kg/hr (Order-Specific), Last Rate: 18.1 Units/kg/hr (07/29/24 0528)         Labs:    CBC    Recent Labs     07/28/24  0530 07/29/24  0436   WBC 10.72* 6.18   HGB 9.4* 8.2*   HCT 29.9* 25.5*    157     BMP    Recent Labs     07/28/24  0530 07/29/24  0436   SODIUM 143 139   K 5.1 4.1   * 106   CO2 26 25   AGAP 8 8   BUN 31* 31*   CREATININE 2.00* 1.99*   CALCIUM 9.2 8.3*       Coags    Recent Labs     07/27/24  1523 07/27/24 2113 07/28/24 2015 07/29/24  0436   INR 1.96*  --   --   --    PTT 33   < > 37 55*    < > = values in this interval not displayed.        Additional Electrolytes  Recent Labs     07/28/24  0530   MG 1.6*   CAIONIZED 1.03*          Blood Gas    No recent results  Recent Labs     07/27/24  0927   PHVEN 7.460*   QMS6HXG 35.8*   PO2VEN 65.8*   ZPC3EXV 24.9   BEVEN 1.3   P4XAZZK 89.6*    LFTs  Recent Labs     07/28/24  0530   ALT 42   AST 37   ALKPHOS 74   ALB 2.9*   TBILI 0.76       Infectious  No recent results  Glucose  Recent Labs     07/27/24  1523 07/28/24  0530 07/29/24  0436   GLUC 109 136 131               Navjot Cline MD

## 2024-07-29 NOTE — PLAN OF CARE
Problem: Prexisting or High Potential for Compromised Skin Integrity  Goal: Skin integrity is maintained or improved  Description: INTERVENTIONS:  - Identify patients at risk for skin breakdown  - Assess and monitor skin integrity  - Assess and monitor nutrition and hydration status  - Monitor labs   - Assess for incontinence   - Turn and reposition patient  - Assist with mobility/ambulation  - Relieve pressure over bony prominences  - Avoid friction and shearing  - Provide appropriate hygiene as needed including keeping skin clean and dry  - Evaluate need for skin moisturizer/barrier cream  - Collaborate with interdisciplinary team   - Patient/family teaching  - Consider wound care consult   Outcome: Progressing     Problem: PAIN - ADULT  Goal: Verbalizes/displays adequate comfort level or baseline comfort level  Description: Interventions:  - Encourage patient to monitor pain and request assistance  - Assess pain using appropriate pain scale  - Administer analgesics based on type and severity of pain and evaluate response  - Implement non-pharmacological measures as appropriate and evaluate response  - Consider cultural and social influences on pain and pain management  - Notify physician/advanced practitioner if interventions unsuccessful or patient reports new pain  Outcome: Progressing     Problem: INFECTION - ADULT  Goal: Absence or prevention of progression during hospitalization  Description: INTERVENTIONS:  - Assess and monitor for signs and symptoms of infection  - Monitor lab/diagnostic results  - Monitor all insertion sites, i.e. indwelling lines, tubes, and drains  - Monitor endotracheal if appropriate and nasal secretions for changes in amount and color  - [place] appropriate cooling/warming therapies per order  - Administer medications as ordered  - Instruct and encourage patient and family to use good hand hygiene technique  - Identify and instruct in appropriate isolation precautions for  identified infection/condition  Outcome: Progressing     Problem: SAFETY ADULT  Goal: Patient will remain free of falls  Description: INTERVENTIONS:  - Educate patient/family on patient safety including physical limitations  - Instruct patient to call for assistance with activity   - Consult OT/PT to assist with strengthening/mobility   - Keep Call bell within reach  - Keep bed low and locked with side rails adjusted as appropriate  - Keep care items and personal belongings within reach  - Initiate and maintain comfort rounds  - Make Fall Risk Sign visible to staff  - Offer Toileting in advance of need  - Initiate/Maintain bed alarm  - Obtain necessary fall risk management equipment  - Apply yellow socks and bracelet for high fall risk patients  - Consider moving patient to room near nurses station  Outcome: Progressing  Goal: Maintain or return to baseline ADL function  Description: INTERVENTIONS:  -  Assess patient's ability to carry out ADLs; assess patient's baseline for ADL function and identify physical deficits which impact ability to perform ADLs (bathing, care of mouth/teeth, toileting, grooming, dressing, etc.)  - Assess/evaluate cause of self-care deficits   - Assess range of motion  - Assess patient's mobility; develop plan if impaired  - Assess patient's need for assistive devices and provide as appropriate  - Encourage maximum independence but intervene and supervise when necessary  - Involve family in performance of ADLs  - Assess for home care needs following discharge   - Consider OT consult to assist with ADL evaluation and planning for discharge  - Provide patient education as appropriate  Outcome: Progressing  Goal: Maintains/Returns to pre admission functional level  Description: INTERVENTIONS:  - Perform AM-PAC 6 Click Basic Mobility/ Daily Activity assessment daily.  - Set and communicate daily mobility goal to care team and patient/family/caregiver.   - Collaborate with rehabilitation  services on mobility goals if consulted  - Out of bed for toileting  - Record patient progress and toleration of activity level   Outcome: Progressing     Problem: DISCHARGE PLANNING  Goal: Discharge to home or other facility with appropriate resources  Description: INTERVENTIONS:  - Identify barriers to discharge w/patient and caregiver  - Arrange for needed discharge resources and transportation as appropriate  - Identify discharge learning needs (meds, wound care, etc.)  - Arrange for interpretive services to assist at discharge as needed  - Refer to Case Management Department for coordinating discharge planning if the patient needs post-hospital services based on physician/advanced practitioner order or complex needs related to functional status, cognitive ability, or social support system  Outcome: Progressing     Problem: CARDIOVASCULAR - ADULT  Goal: Maintains optimal cardiac output and hemodynamic stability  Description: INTERVENTIONS:  - Monitor I/O, vital signs and rhythm  - Monitor for S/S and trends of decreased cardiac output  - Administer and titrate ordered vasoactive medications to optimize hemodynamic stability  - Assess quality of pulses, skin color and temperature  - Assess for signs of decreased coronary artery perfusion  - Instruct patient to report change in severity of symptoms  Outcome: Progressing  Goal: Absence of cardiac dysrhythmias or at baseline rhythm  Description: INTERVENTIONS:  - Continuous cardiac monitoring, vital signs, obtain 12 lead EKG if ordered  - Administer antiarrhythmic and heart rate control medications as ordered  - Monitor electrolytes and administer replacement therapy as ordered  Outcome: Progressing     Problem: RESPIRATORY - ADULT  Goal: Achieves optimal ventilation and oxygenation  Description: INTERVENTIONS:  - Assess for changes in respiratory status  - Assess for changes in mentation and behavior  - Position to facilitate oxygenation and minimize respiratory  effort  - Oxygen administered by appropriate delivery if ordered  - Initiate smoking cessation education as indicated  - Encourage broncho-pulmonary hygiene including cough, deep breathe, Incentive Spirometry  - Assess the need for suctioning and aspirate as needed  - Assess and instruct to report SOB or any respiratory difficulty  - Respiratory Therapy support as indicated  Outcome: Progressing     Problem: METABOLIC, FLUID AND ELECTROLYTES - ADULT  Goal: Electrolytes maintained within normal limits  Description: INTERVENTIONS:  - Monitor labs and assess patient for signs and symptoms of electrolyte imbalances  - Administer electrolyte replacement as ordered  - Monitor response to electrolyte replacements, including repeat lab results as appropriate  - Instruct patient on fluid and nutrition as appropriate  Outcome: Progressing  Goal: Fluid balance maintained  Description: INTERVENTIONS:  - Monitor labs   - Monitor I/O and WT  - Instruct patient on fluid and nutrition as appropriate  - Assess for signs & symptoms of volume excess or deficit  Outcome: Progressing  Goal: Glucose maintained within target range  Description: INTERVENTIONS:  - Monitor Blood Glucose as ordered  - Assess for signs and symptoms of hyperglycemia and hypoglycemia  - Administer ordered medications to maintain glucose within target range  - Assess nutritional intake and initiate nutrition service referral as needed  Outcome: Progressing     Problem: HEMATOLOGIC - ADULT  Goal: Maintains hematologic stability  Description: INTERVENTIONS  - Assess for signs and symptoms of bleeding or hemorrhage  - Monitor labs  - Administer supportive blood products/factors as ordered and appropriate  Outcome: Progressing

## 2024-07-29 NOTE — SPEECH THERAPY NOTE
Speech Language/Pathology    Speech/Language Pathology Progress Note    Patient Name: Bhavna Al  Today's Date: 2024     Problem List  Principal Problem:    encephalopathy  Active Problems:    Thrombocytopenia (HCC)    Rheumatoid arthritis (HCC)    Morbid obesity (HCC)    Acute on chronic diastolic heart failure, LVEF 65%, LVIDd 3.3 cm, AHA Stage C    Ambulatory dysfunction    MRSA bacteremia    Persistent atrial fibrillation    Acute kidney injury superimposed on chronic kidney disease  (HCC)    Chronic pruritic rash in adult    Elevated troponin       Past Medical History  Past Medical History:   Diagnosis Date    Abnormal thyroid function test     last assessed: 2015     Arthritis     Caries     last assessed: 2016     Continuous opioid dependence (HCC) 2021    Edema of right lower extremity     last assessed: 2015     GERD (gastroesophageal reflux disease)     Hypertension     Medicare annual wellness visit, subsequent 2021    Positive blood culture 3/11/2022    Sarcoid         Past Surgical History  Past Surgical History:   Procedure Laterality Date     SECTION      IR NON-TUNNELED CENTRAL LINE PLACEMENT  2024    IR PICC PLACEMENT SINGLE LUMEN  2024    IR PICC PLACEMENT SINGLE LUMEN  2024    MULTIPLE TOOTH EXTRACTIONS N/A 2016    Procedure: Surgical extraction of teeth 2, 18, 19, 30, 31; incision and drainage of left subperiosteal abscess ;  Surgeon: Clara Cevallos DMD;  Location: [facility];  Service:          Subjective:  Pt more alert today. Eyes open. Nods and smiles.   Objective:  Seen for po potential as pt was made NPO due to lethargy. Unable to take oral meds. Today she was able to take sips of water by straw. Opened readily for spoon and straw.  Mildly prolonged oral hold but good control. Prompt swallow. No cough or wet vocal quality. Bolus hold of puree was extremely prolonged. Pt needed verbal cues and/or next presentation  brought close to her mouth to elicit a transfer and swallow. No obvious oral residue. No cough.   Assessment:  More alert. Tolerated po trials but holds it in her mouth/delayed transfer.   Plan/Recommendations:  Tolerated small amounts po. Ok for meds crushed. OK for puree diet and thin liquids but will need constant supervision and cuing. Question if pt will take enough to meet nutritional needs. Aspiration precautions posted. D/w nurse.

## 2024-07-29 NOTE — ASSESSMENT & PLAN NOTE
Recent Labs     07/27/24  1523 07/28/24  0530 07/29/24  0436   CREATININE 1.94* 2.00* 1.99*   EGFR 24 23 23       CKD stage 3a  Creatinine baseline is 1-1.2 prior to this admission  Monitor kidney indices  Avoid nephrotoxic medications  Creatinine currently in the range of 1.9-2  May be new baseline

## 2024-07-29 NOTE — ASSESSMENT & PLAN NOTE
Received a DI score on this patient patient 27 July 2024  Acute encephalopathy versus worsening hypoactive delirium  Transferred to the ICU for closer monitoring  The exact etiology of her decreased mental status is not known- CT head was negative, electrolytes are acceptable  MRI is pending  7/29-send is more awake and alert, answering questions but drowsy  Ammonia levels within normal limits, VBG did not show hypercarbia  Patient has been n.p.o., will consider speech evaluation and reintroduction of diet today as patient is more awake and alert

## 2024-07-30 ENCOUNTER — APPOINTMENT (INPATIENT)
Dept: NON INVASIVE DIAGNOSTICS | Facility: HOSPITAL | Age: 74
DRG: 871 | End: 2024-07-30
Payer: MEDICARE

## 2024-07-30 PROBLEM — Z71.89 GOALS OF CARE, COUNSELING/DISCUSSION: Status: ACTIVE | Noted: 2024-07-30

## 2024-07-30 LAB
GLUCOSE SERPL-MCNC: 78 MG/DL (ref 65–140)
GLUCOSE SERPL-MCNC: 85 MG/DL (ref 65–140)
GLUCOSE SERPL-MCNC: 87 MG/DL (ref 65–140)

## 2024-07-30 PROCEDURE — 99223 1ST HOSP IP/OBS HIGH 75: CPT | Performed by: INTERNAL MEDICINE

## 2024-07-30 PROCEDURE — 92526 ORAL FUNCTION THERAPY: CPT

## 2024-07-30 PROCEDURE — 82948 REAGENT STRIP/BLOOD GLUCOSE: CPT

## 2024-07-30 PROCEDURE — 99232 SBSQ HOSP IP/OBS MODERATE 35: CPT | Performed by: INTERNAL MEDICINE

## 2024-07-30 PROCEDURE — NC001 PR NO CHARGE: Performed by: INTERNAL MEDICINE

## 2024-07-30 RX ORDER — FUROSEMIDE 20 MG/1
20 TABLET ORAL DAILY
Status: DISCONTINUED | OUTPATIENT
Start: 2024-07-30 | End: 2024-08-02 | Stop reason: HOSPADM

## 2024-07-30 RX ORDER — FUROSEMIDE 10 MG/ML
10 INJECTION INTRAMUSCULAR; INTRAVENOUS ONCE
Status: COMPLETED | OUTPATIENT
Start: 2024-07-30 | End: 2024-07-30

## 2024-07-30 RX ORDER — LABETALOL HYDROCHLORIDE 5 MG/ML
10 INJECTION, SOLUTION INTRAVENOUS EVERY 6 HOURS PRN
Status: DISCONTINUED | OUTPATIENT
Start: 2024-07-30 | End: 2024-08-02 | Stop reason: HOSPADM

## 2024-07-30 RX ORDER — PANTOPRAZOLE SODIUM 40 MG/1
40 TABLET, DELAYED RELEASE ORAL
Status: DISCONTINUED | OUTPATIENT
Start: 2024-07-31 | End: 2024-08-02 | Stop reason: HOSPADM

## 2024-07-30 RX ORDER — LABETALOL HYDROCHLORIDE 5 MG/ML
10 INJECTION, SOLUTION INTRAVENOUS EVERY 6 HOURS
Status: DISCONTINUED | OUTPATIENT
Start: 2024-07-30 | End: 2024-07-30

## 2024-07-30 RX ADMIN — APIXABAN 5 MG: 5 TABLET, FILM COATED ORAL at 18:10

## 2024-07-30 RX ADMIN — CHLORHEXIDINE GLUCONATE 15 ML: 1.2 RINSE ORAL at 09:43

## 2024-07-30 RX ADMIN — METOPROLOL TARTRATE 25 MG: 25 TABLET, FILM COATED ORAL at 09:42

## 2024-07-30 RX ADMIN — Medication: at 09:43

## 2024-07-30 RX ADMIN — FUROSEMIDE 20 MG: 20 TABLET ORAL at 12:08

## 2024-07-30 RX ADMIN — APIXABAN 5 MG: 5 TABLET, FILM COATED ORAL at 09:34

## 2024-07-30 RX ADMIN — PANTOPRAZOLE SODIUM 40 MG: 40 INJECTION, POWDER, FOR SOLUTION INTRAVENOUS at 09:43

## 2024-07-30 RX ADMIN — FUROSEMIDE 10 MG: 10 INJECTION, SOLUTION INTRAVENOUS at 06:20

## 2024-07-30 RX ADMIN — DOXYCYCLINE 100 MG: 100 CAPSULE ORAL at 21:49

## 2024-07-30 RX ADMIN — DOXYCYCLINE 100 MG: 100 CAPSULE ORAL at 09:44

## 2024-07-30 RX ADMIN — Medication: at 18:10

## 2024-07-30 RX ADMIN — CHLORHEXIDINE GLUCONATE 15 ML: 1.2 RINSE ORAL at 21:49

## 2024-07-30 RX ADMIN — PREDNISONE 5 MG: 5 TABLET ORAL at 09:39

## 2024-07-30 NOTE — ASSESSMENT & PLAN NOTE
Cardio on board  Was on diuretics drip, now on PO lasix    Wt Readings from Last 3 Encounters:   07/29/24 96.4 kg (212 lb 8.4 oz)   07/11/24 108 kg (238 lb)   07/05/24 108 kg (238 lb 1.6 oz)

## 2024-07-30 NOTE — QUICK NOTE
Patient's daughter, Rhoda Al, as well as granddaughters' Devorah Salamanca and Bhavna Salamanca, were updated by Critical Care Attending, Dr. Anthony, and myself regarding patient's current condition and treatment plan including the planned transfer to [facility] since she is no longer requiring critical care. Additionally, we discussed patient's chronic conditions and multiple recent hospitalizations. Patient's daughter states they would like for patient to be discharged to a different facility when medically cleared. Case Management was informed of this and plans to meet with the family as well. Code status was also discussed. Patient's daughter states she would be okay with intubation if medically necessary. She wished to discuss with the family regarding CPR prior to changing code status. Palliative care is consulted and plans to contact the family later today via phone call. Will continue level 1 full code for now pending family discussion regarding CPR and palliative care consult.     Milagro Burns MD  Pulmonary and Critical Care Fellow, PGY-6  [facility]

## 2024-07-30 NOTE — PROGRESS NOTES
Cardiology Progress Note   MD Osvaldo Reyes MD, FACC  Kostas Bruce DO, [facility]  MD Coby Dumont DO, [facility]  Kelton Rodriguez DO [facility]  ----------------------------------------------------------------  [address] [address]    Bhavna Al 74 y.o. 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  Persistent atrial fibrillation with intermittent RVR  Acute kidney injury on CKD  Nonischemic troponin elevation secondary to sepsis, acute kidney injury  Acquired adrenal insufficiency  Acute on chronic HFpEF  LVEF > 75%, severe MAC, trace MR, mild TR with PASP 65 mmHg, he has July 2024  Severe rheumatoid arthritis  Severe ambulatory dysfunction  Psoriasis    PLAN:  Weights not obtained today, the patient back on oral diuretic  Status post IV fluids yesterday; would recommend careful IV hydration going forward during the rest of the hospitalization  Low threshold to uptitrate diuretic as needed  Continue metoprolol 25 mg twice daily  Eliquis for thromboembolic prophylaxis  Keep potassium greater than 4 and magnesium greater than 2  Strict I's/O's and daily weights  Steroids as per primary team  Cognitively appears to be improving  Guarded long-term prognosis    Signed: Kostas Bruce DO, [facility], LINDSEY MATHEWS, FACP      History of Present Illness:  Patient seen and examined.  No acute events overnight.  Denies chest pain, pressure, tightness or squeezing.  Denies lightheadedness, dizziness or palpitations.  Denies lower extremity swelling, orthopnea or paroxysmal nocturnal dyspnea.  Family at bedside.      Review of Systems:  Review of Systems   Constitutional: Negative for decreased appetite, fever, weight gain and weight loss.   HENT:  Negative for congestion and sore throat.    Eyes:  Negative for visual disturbance.   Cardiovascular:   Negative for chest pain, dyspnea on exertion, leg swelling, near-syncope and palpitations.   Respiratory:  Negative for cough and shortness of breath.    Hematologic/Lymphatic: Negative for bleeding problem.   Skin:  Negative for rash.   Musculoskeletal:  Negative for myalgias and neck pain.   Gastrointestinal:  Negative for abdominal pain and nausea.   Neurological:  Negative for light-headedness and weakness.   Psychiatric/Behavioral:  Negative for depression.         Allergies   Allergen Reactions    Shellfish-Derived Products - Food Allergy Itching    Methotrexate Derivatives     Amoxicillin Rash    Ampicillin-Sulbactam Sodium Rash       No current facility-administered medications on file prior to encounter.     Current Outpatient Medications on File Prior to Encounter   Medication Sig    ammonium lactate (LAC-HYDRIN) 12 % cream Apply topically 2 (two) times a day    acetaminophen (TYLENOL) 325 mg tablet Take 2 tablets (650 mg total) by mouth every 4 (four) hours as needed for mild pain, headaches or fever. (Patient taking differently: Take 650 mg by mouth every 6 (six) hours as needed for mild pain, headaches or fever)    albuterol (Ventolin HFA) 90 mcg/act inhaler Inhale 2 puffs every 6 (six) hours as needed for wheezing    alendronate (FOSAMAX) 70 mg tablet Take 70 mg by mouth every 7 days Do not start before July 28, 2024.    apixaban (ELIQUIS) 5 mg Take 1 tablet (5 mg total) by mouth 2 (two) times a day    bisacodyl (FLEET) 10 MG/30ML ENEM Insert 10 mg into the rectum once (Patient not taking: Reported on 7/17/2024)    cholecalciferol (VITAMIN D3) 1,000 units tablet Take 1 tablet (1,000 Units total) by mouth daily    clobetasol (TEMOVATE) 0.05 % cream Apply topically 2 (two) times a day    furosemide (LASIX) 20 mg tablet Take 1 tablet (20 mg total) by mouth daily    hydroxychloroquine (PLAQUENIL) 200 mg tablet Take 1 tablet (200 mg total) by mouth 2 (two) times a day    lidocaine (LIDODERM) 5 % Apply 2  patches topically daily Remove & Discard patch within 12 hours or as directed by MD Do not start before December 20, 2022. (Patient taking differently: Apply 2 patches topically daily Remove & Discard patch within 12 hours or as directed by MD)    metoprolol tartrate (LOPRESSOR) 25 mg tablet Take 1 tablet (25 mg total) by mouth every 12 (twelve) hours    miconazole 2 % cream Apply topically 2 (two) times a day    nystatin (MYCOSTATIN) powder Apply topically 2 (two) times a day    pantoprazole (PROTONIX) 40 mg tablet Take 1 tablet (40 mg total) by mouth daily in the early morning    predniSONE 5 mg tablet Take 1 tablet (5 mg total) by mouth 2 (two) times a day with meals Do not start before June 5, 2023.    triamcinolone (KENALOG) 0.1 % cream Apply topically 2 (two) times a day Apply to BUE and BLE topically everyday and evening shift for psoriasis    Zinc 50 MG TABS Take 1 tablet by mouth in the morning        Current Facility-Administered Medications   Medication Dose Route Frequency Provider Last Rate    acetaminophen  650 mg Oral Q4H PRN AZIZA Batista      ammonium lactate   Topical BID AZIZA Batista      apixaban  5 mg Oral BID Navjot Cline MD      chlorhexidine  15 mL Mouth/Throat Q12H [facility] AZIZA Batista      doxycycline hyclate  100 mg Oral BID AZIZA Hurt      furosemide  20 mg Oral Daily Navjot Cline MD      labetalol  10 mg Intravenous Q6H PRN Navjot Cline MD      levalbuterol  1.25 mg Nebulization Q8H PRN AZIZA Batista      metoprolol tartrate  25 mg Oral Q12H [facility] Navjot Cline MD      ondansetron  4 mg Intravenous Q4H PRN AZIZA Batista      [START ON 7/31/2024] pantoprazole  40 mg Oral Early Morning Navjot Cline MD      phenol  1 spray Mouth/Throat Q2H PRN Colleen Gonzalez MD      predniSONE  5 mg Oral BID With Meals Navjot Cline MD      trimethobenzamide   "200 mg Intramuscular Q6H PRN AZIZA Batista                Vitals:    07/30/24 0200 07/30/24 0600 07/30/24 0700 07/30/24 0800   BP: (!) 174/73 (!) 191/81  166/73   Pulse: 67 87  88   Resp: (!) 24 (!) 25  (!) 32   Temp:   (!) 96.8 °F (36 °C)    TempSrc:   Axillary    SpO2: 99% 97%  98%   Weight:       Height:         Body mass index is 35.37 kg/m².      Intake/Output Summary (Last 24 hours) at 7/30/2024 1121  Last data filed at 7/30/2024 0956  Gross per 24 hour   Intake 1210.12 ml   Output 920 ml   Net 290.12 ml       Weight change:     PHYSICAL EXAMINATION:  Gen: Awake, confused, NAD  Head/eyes: AT/NC, pupils equal and round, Anicteric  ENT: mmm  Neck: Supple, No elevated JVP, trachea midline  Resp: CTA bilaterally no w/r/r  CV: irreg irreg +S1, S2, No m/r/g  Abd: Soft, NT/ND + BS  Ext: Trivial pitting LE edema bilaterally  Neuro: Does not follow commands, but moves all extermities  Psych: Blunted affect, pleasant mood, pleasant attitude, non-combative  Skin: warm; no rash, erythema or venous stasis changes on exposed skin    Lab Results:  Results from last 7 days   Lab Units 07/29/24  0436   WBC Thousand/uL 6.18   HEMOGLOBIN g/dL 8.2*   HEMATOCRIT % 25.5*   PLATELETS Thousands/uL 157     Results from last 7 days   Lab Units 07/29/24  0436 07/28/24  0530   POTASSIUM mmol/L 4.1 5.1   CHLORIDE mmol/L 106 109*   CO2 mmol/L 25 26   BUN mg/dL 31* 31*   CREATININE mg/dL 1.99* 2.00*   CALCIUM mg/dL 8.3* 9.2   ALK PHOS U/L  --  74   ALT U/L  --  42   AST U/L  --  37     No results found for: \"TROPONINT\"            Results from last 7 days   Lab Units 07/27/24  1523   INR  1.96*       Tele: AF 60s to 100s     This note was completed in part utilizing M-Modal Fluency Direct Software.  Grammatical errors, random word insertions, spelling mistakes, and incomplete sentences may be an occasional consequence of this system secondary to software limitations, ambient noise, and hardware issues.  If you have any questions or " concerns about the content, text, or information contained within the body of this dictation, please contact the provider for clarification.

## 2024-07-30 NOTE — SPEECH THERAPY NOTE
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    Elevated troponin       Past Medical History  Past Medical History:   Diagnosis Date    Abnormal thyroid function test     last assessed: 2015     Arthritis     Caries     last assessed: 2016     Continuous opioid dependence (HCC) 2021    Edema of right lower extremity     last assessed: 2015     GERD (gastroesophageal reflux disease)     Hypertension     Medicare annual wellness visit, subsequent 2021    Positive blood culture 3/11/2022    Sarcoid         Past Surgical History  Past Surgical History:   Procedure Laterality Date     SECTION      IR NON-TUNNELED CENTRAL LINE PLACEMENT  2024    IR PICC PLACEMENT SINGLE LUMEN  2024    IR PICC PLACEMENT SINGLE LUMEN  2024    MULTIPLE TOOTH EXTRACTIONS N/A 2016    Procedure: Surgical extraction of teeth 2, 18, 19, 30, 31; incision and drainage of left subperiosteal abscess ;  Surgeon: Clara Cevallos DMD;  Location: [facility];  Service:          Subjective:  Pt alert. Initial responded to ?'s briefly but then stopped responding, though she smiled and nodded her head.   Objective:  Pt seen for po tolerance and further recommendations. Reported had ~ 20 % of her breakfast. Agreeable to jello w/ me. Ate 20%, slow rate but otherwise tolerated well w/ prompt transfer and swallow response. No cough. Refused Juice and the glucerna. Closed her mouth and refused to open for straw. Shook head no that she  didn't want any.   Assessment:  No overt s/s w/ minimal PO intake.   Plan/Recommendations:  Continue puree w/ thin liquids for now. Monitor intake. Noted some meds are crushed and oral. Others IV.

## 2024-07-30 NOTE — PROGRESS NOTES
[facility]  Progress Note  Name: Bhavna Al I  MRN: [id number]  Unit/Bed#: ICU 06 I Date of Admission: 7/21/2024   Date of Service: 7/30/2024 I Hospital Day: 9    Assessment & Plan   * encephalopathy  Assessment & Plan  Received a DI score on this patient patient 27 July 2024  Acute encephalopathy possibly secondary to worsening hypoactive delirium  Transferred to the ICU for closer monitoring  Patient currently more , awake an alert, oriented, but fatigued and reluctant to talk  Ammonia levels within normal limits, VBG did not show hypercarbia  7/29-passed speech and swallow evaluation yesterday with dysphagia diet.  All medications were changed to PO but patient has not been taking much orally so medications were switched to IV last night  Plan- continue to monitor, can be downgraded to medsurg    Persistent atrial fibrillation  Assessment & Plan  Home med is lopressor 25 mg BID  Dig loaded on 7/26-27  Digoxin levels checked this morning-within normal limits  Currently patient on IV Lopressor 5 mg every 6 hours, also digoxin 125 mcg daily  On P.O eliquis currently   Cardiology team on board-appreciate recommendations  Continuous cardiopulmonary monitoring    MRSA bacteremia  Assessment & Plan  History of MRSA bacteremia since possibly secondary to osteomyelitis of the spine  Initially presented on 6/6/2024 with lethargy, poor oral intake  IV daptomycin changed to IV Vanco due to rising CPK through 7/22 per ID then starting PO Doxy   Bracing deferred due to bedbound status, body habitus and underlying skin condition.  Repeat echo does not show any valvular vegetation.  Blood cultures negative at 72 hours.    Plan  Continue doxycycline IV , may switch to PO once pt tolerates oral meds until inflammatory markers stabilize/normalize in the outpatient setting. May take several weeks.  ID following    Elevated troponin  Assessment & Plan  Troponin trend 1374->1239->296  Likely non-MI troponin  elevation in setting of sepsis and acute kidney injury    Chronic pruritic rash in adult  Assessment & Plan  Diagnosis psoriasis proven by biopsy  Continue to use Lac-Hydrin lotion as needed    Acute kidney injury superimposed on chronic kidney disease  (HCC)  Assessment & Plan  Recent Labs     07/27/24  1523 07/28/24  0530 07/29/24  0436   CREATININE 1.94* 2.00* 1.99*   EGFR 24 23 23       CKD stage 3a  Creatinine baseline is 1-1.2 prior to this admission  Monitor kidney indices  Avoid nephrotoxic medications  Creatinine currently in the range of 1.9-2  May be new baseline    Ambulatory dysfunction  Assessment & Plan  Patient is bed bound, wheelchair bound and she is vamshi lift at facility at baseline.  No further PT needs.    Acute on chronic diastolic heart failure, LVEF 65%, LVIDd 3.3 cm, AHA Stage C  Assessment & Plan  Wt Readings from Last 3 Encounters:   07/29/24 96.4 kg (212 lb 8.4 oz)   07/11/24 108 kg (238 lb)   07/05/24 108 kg (238 lb 1.6 oz)     Pulmonary edema likely due to Afib RVR   At [place] she is on Lasix 20 mg daily.   Echo 7/22/2024 shows left ventricular ejection fraction is >75%. Systolic function is hyperdynamic. Unable to assess diastolic function due to the degree of mitral annular calcification.   Given 1 dose of IV Lasix 7/23 after which she diuresed only 300 ml.  Given Bumex 4 mg 7/28  after which she made a UOP of 1.5 L.  Further dosing of IV diuretic was held 7/29/24 per cardiology recommendation  Overnight low urine out put received IV lasix 10 mg     Plan:   Resume lasix 20 mg daily on discharge  Appreciate cardiology team's recommendations        Morbid obesity (HCC)  Assessment & Plan  Recommend decrease caloric intake and increase activity    Rheumatoid arthritis (HCC)  Assessment & Plan  Continue Tylenol 650 mg p.o. every 6 hours  Currently on 4 mg Solu-Medrol twice daily  Home regimen prednisone 5 mg twice daily             Disposition: Stepdown Level 1    ICU Core Measures      A: Assess, Prevent, and Manage Pain Has pain been assessed? Yes  Need for changes to pain regimen? No   B: Both SAT/SAT  N/A   C: Choice of Sedation RASS Goal: -1 Drowsy or 0 Alert and Calm  Need for changes to sedation or analgesia regimen? No   D: Delirium CAM-ICU: Positive   E: Early Mobility  Plan for early mobility? Yes   F: Family Engagement Plan for family engagement today? Yes       Antibiotic Review: Patient on appropriate coverage based on culture data.     Review of Invasive Devices:    Corey Plan: Voiding trial after improvement in ambulation         Prophylaxis:  VTE VTE covered by:  apixaban, Oral, 5 mg at 07/29/24 1722       Stress Ulcer  covered bypantoprazole (PROTONIX) 40 mg tablet [[id number]] (Long-Term Med), pantoprazole (PROTONIX) injection 40 mg [[id number]]         Significant 24hr Events     24hr events: With the past 24 hours patient has been more alert and active but reluctant to talk.  Patient passed the speech and swallow evaluation and started on a dysphagia diet.  Patient's oral intake has been poor hence continuing on IV medications.  Heparin drip was stopped and started on p.o. Eliquis.     Subjective   Review of Systems: See HPI for Review of Systems     Objective                            Vitals I/O      Most Recent Min/Max in 24hrs   Temp 99 °F (37.2 °C) Temp  Min: 98.2 °F (36.8 °C)  Max: 99.4 °F (37.4 °C)   Pulse 67 Pulse  Min: 65  Max: 97   Resp (!) 24 Resp  Min: 10  Max: 47   BP (!) 174/73 BP  Min: 128/70  Max: 185/74   O2 Sat 99 % SpO2  Min: 97 %  Max: 99 %      Intake/Output Summary (Last 24 hours) at 7/30/2024 0745  Last data filed at 7/30/2024 0254  Gross per 24 hour   Intake 3366.14 ml   Output 705 ml   Net 2661.14 ml       Diet Dysphagia/Modified Consistency; Dysphagia 1-Pureed; Thin Liquid    Invasive Monitoring           Physical Exam   Physical Exam  Eyes:      Extraocular Movements: Extraocular movements intact.      Conjunctiva/sclera: Conjunctivae normal.    Skin:     General: Skin is warm.      Comments: Wounds noted-sacral and lower extremity wounds  Chronic skin sloughing   HENT:      Mouth/Throat:      Mouth: Mucous membranes are moist.   Cardiovascular:      Rate and Rhythm: Normal rate and regular rhythm.   Musculoskeletal:      Right lower le+ Edema present.      Left lower le+ Edema present.   Abdominal:      Palpations: Abdomen is soft.   Constitutional:       Appearance: She is ill-appearing.   Pulmonary:      Effort: Pulmonary effort is normal.      Breath sounds: Normal breath sounds.   Neurological:      Comments: Alert but reluctant to talk  fatigued   Genitourinary/Anorectal:  Corey present.          Diagnostic Studies      EKG: no new ekgs  Imaging: no ne imaging I have personally reviewed pertinent reports.       Medications:  Scheduled PRN   ammonium lactate, , BID  apixaban, 5 mg, BID  chlorhexidine, 15 mL, Q12H FAMILIA  doxycycline hyclate, 100 mg, BID  metoprolol tartrate, 25 mg, Q12H FAMILIA  pantoprazole, 40 mg, Q24H FAMILIA  predniSONE, 5 mg, BID With Meals      acetaminophen, 650 mg, Q4H PRN  levalbuterol, 1.25 mg, Q8H PRN  ondansetron, 4 mg, Q4H PRN  phenol, 1 spray, Q2H PRN  trimethobenzamide, 200 mg, Q6H PRN       Continuous    dextrose 5 % and sodium chloride 0.45 %, 75 mL/hr, Last Rate: 75 mL/hr (24)         Labs:    CBC    Recent Labs     24  0436   WBC 6.18   HGB 8.2*   HCT 25.5*        BMP    Recent Labs     24  0436   SODIUM 139   K 4.1      CO2 25   AGAP 8   BUN 31*   CREATININE 1.99*   CALCIUM 8.3*       Coags    Recent Labs     24  0436   PTT 37 55*        Additional Electrolytes  Recent Labs     24  0829   CAIONIZED 1.01*          Blood Gas    No recent results  No recent results LFTs  No recent results    Infectious  No recent results  Glucose  Recent Labs     24  0436   GLUC 131               Navjot Cline MD

## 2024-07-30 NOTE — PROGRESS NOTES
Critical Care Interval Transfer Note:    Brief Hospital Summary:   74-year-old female with history of rheumatoid arthritis, recent MRSA bacteremia dating of osteomyelitis currently on oral Doxycycline, HFrEF, A-fib on Eliquis brought in this admission from her facility due to poor oral intake and hypotension.  Patient was in the ICU for altered mental status  likely secondary to hypoactive delirium.  Currently resolved, mentation improved.  Tolerating oral dysphagia diet and oral pills.  She has bilateral upper extremity edema due to the IV lines-Doppler was ordered, patient has been refusing Doppler ultrasound  For sacral osteomyelitis with MRSA on suppressive antibiotic therapy with oral doxycycline.  ID team on board  Rheumatoid arthritis on oral prednisone  HFpEF on oral Lasix home dose    Barriers to discharge:   Goals of care discussion  Pending bilateral ultrasound doppler of bilateral upper extremity       Consults: IP CONSULT TO CARDIOLOGY  IP CONSULT TO CASE MANAGEMENT  IP CONSULT TO INFECTIOUS DISEASES  IP CONSULT TO INFECTIOUS DISEASES  IP CONSULT TO PHARMACY  IP CONSULT TO HEMATOLOGY  IP CONSULT TO CASE MANAGEMENT  IP CONSULT TO PALLIATIVE CARE         Discharge Plan: Anticipate discharge in 48-72 hrs to rehab facility.              Patient seen and evaluated by Critical Care today and deemed to be appropriate for transfer to Med Surg. Spoke to Dr Acosta from internal medicine  to accept transfer. Critical care can be contacted via [employer] with any questions or concerns.

## 2024-07-30 NOTE — ASSESSMENT & PLAN NOTE
Waxes and wanes . A bit better today, but not back to baseline yet per daughter  Consider geriatrics consult of delirium persists

## 2024-07-30 NOTE — ASSESSMENT & PLAN NOTE
Appears to be progressively getting worse over the years, now mostly bed bound since the beginning of this year (2024)  On chronic prednisone

## 2024-07-30 NOTE — CONSULTS
[facility]  Consult  Name: Bhavna Al 74 y.o. female I MRN: [id number]  Unit/Bed#: ICU 06 I Date of Admission: 7/21/2024   Date of Service: 7/30/2024 I Hospital Day: 9    Inpatient consult to Palliative Care  Consult performed by: Mena Oviedo MD  Consult ordered by: Navjot Cline MD          Assessment & Plan   Goals of care, counseling/discussion  Assessment & Plan  Patient appears to lack the capacity to make complex medical choices on exam today.  Daughter/Rhoda is her NOK per [place] Act 169. Discussion had with her  Discussed overall trajectory of heart failure and complications of severe rheumatoid arthritis over time. The trend is that of a decline, just a matter of how quick/slow and when  She feels that the patient's overall state of health had declines at least in the past few months, with her being able to transfer from bed to chair to now mostly bed bound since January of 2024 due to her RA  Discussed that she likely will decline more, especially if her nutrition does not improve.   At this time, poor nutrition is multifactorial with dysphagia, poor appetite, delirium, medications (antibiotics) and acute illnesses in the last few month contributing. She will need time to recover from all of these to see if nutrition improves well enough that she starts to eat more.  Discussed code status and difference between Full code and DNR/DNI. Discussed that CPR will not guarantee a recovery of neuro function nor quality of life (broken bones, poorer EF, pain). DNR/DNI will not mean stopping all other treatments for whatever she is dealing with.   Discussed hospice and comfort cares at length, emphasizing that this will be to ensure comfort at end of life. At this time, I do not appreciate any diagnosis/es that will carry a prognosis of 6 months or less to qualify her for hospice cares. However, should she decline further and her nutrition does not improve, severe  protein calorie malnutrition is considered a hospice diagnosis.   Daughter will think about code status some more, hoping she can speak to the patient more about this.   SLIM/Primary team to follow up on decision.  If patient able to consent, can offer the family the 5 wishes or SL advised directive paper works. Otherwise, daughter is the NOK for surrogate medical decision making if the capacity is lost per [place] Act 169 and she is made aware and is willing to be her POA.   Palliative will follow only peripherally. Please reach out it if further assistance is needed    Assessment & Plan  Patient appears to lack the capacity to make complex medical choices on exam today.  Daughter/Rhoda is her NOK per [place] 169. Discussion had with her  Discussed overall trajectory of heart failure and complications of severe rheumatoid arthritis over time. The trend is that of a decline, just a matter of how quick/slow and when  She feels that the patient's overall state of health had declines at least in the past few months, with her being able to transfer from bed to chair to now mostly bed bound since January of 2024 due to her RA  Discussed that she likely will decline more, especially if her nutrition does not improve.   At this time, poor nutrition is multifactorial with dysphagia, poor appetite, delirium, medications (antibiotics) and acute illnesses in the last few month contributing. She will need time to recover from all of these to see if nutrition improves well enough that she starts to eat more.  Discussed code status and difference between Full code and DNR/DNI. Discussed that CPR will not guarantee a recovery of neuro function nor quality of life (broken bones, poorer EF, pain). DNR/DNI will not mean stopping all other treatments for whatever she is dealing with.   Discussed hospice and comfort cares at length, emphasizing that this will be to ensure comfort at end of life. At this time, I do not appreciate any  diagnosis/es that will carry a prognosis of 6 months or less to qualify her for hospice cares. However, should she decline further and her nutrition does not improve, severe protein calorie malnutrition is considered a hospice diagnosis.   Daughter will think about code status some more, hoping she can speak to the patient more about this.   SLIM/Primary team to follow up on decision.  If patient able to consent, can offer the family the 5 wishes or SL advised directive paper works. Otherwise, daughter is the NOK for surrogate medical decision making if the capacity is lost per [place] Act 169 and she is made aware and is willing to be her POA.   Palliative will follow only peripherally. Please reach out it if further assistance is needed    MRSA bacteremia  Assessment & Plan  ID on board, on oral doxycycline for planned prolonged course ideally until inflammatory markers normalized/plateau.     Assessment & Plan  ID on board, on oral doxycycline for planned prolonged course ideally until inflammatory markers normalized/plateau.     Acute on chronic diastolic heart failure, LVEF 65%, LVIDd 3.3 cm, AHA Stage C  Assessment & Plan  Cardio on board  Was on diuretics drip, now on PO lasix    Wt Readings from Last 3 Encounters:   07/29/24 96.4 kg (212 lb 8.4 oz)   07/11/24 108 kg (238 lb)   07/05/24 108 kg (238 lb 1.6 oz)               Assessment & Plan  Cardio on board  Was on diuretics drip, now on PO lasix    Wt Readings from Last 3 Encounters:   07/29/24 96.4 kg (212 lb 8.4 oz)   07/11/24 108 kg (238 lb)   07/05/24 108 kg (238 lb 1.6 oz)               Rheumatoid arthritis (HCC)  Assessment & Plan  Appears to be progressively getting worse over the years, now mostly bed bound since the beginning of this year (2024)  On chronic prednisone    Assessment & Plan  Appears to be progressively getting worse over the years, now mostly bed bound since the beginning of this year (2024)  On chronic prednisone    *  encephalopathy  Assessment & Plan  Waxes and wanes . A bit better today, but not back to baseline yet per daughter  Consider geriatrics consult of delirium persists    Assessment & Plan  Waxes and wanes . A bit better today, but not back to baseline yet per daughter  Consider geriatrics consult of delirium persists           Care Coordination  Case discussed w/ ICU    Follow-up  We appreciate the opportunity to participate in this patient's care.   We will continue to follow. [facility] to follow up.  Please do not hesitate to contact our on-call provider through our clinic answering service at [phone] should there be an acute change or other symptom control concerns.    Code status: Level 1 - Full Code   Decisional apparatus:  Patient does not have capacity to make medical decisions on my exam today. If such capacity is lost, patient's substitute decision maker would default to daughter by [place] Act 169.   Advance Directive / Living Will / POLST:  none    I have reviewed the patient's controlled substance dispensing history in the Prescription Drug Monitoring Program in compliance with the [facility] regulations before prescribing any controlled substances.    IDENTIFICATION:  Reason for Consult / Principal Problem: goals    HISTORY OF PRESENT ILLNESS:    Bhavna Al is a 74 y.o. female with with chronic HFpEF, Afib on Eliquis, severe rheumatoid arthritis on plaquenil and prednisone. She was just admitted 1 month ago for MRSA bacteremia and spine osteomyelitis, d/c back to her long term SNF to complete IV antibiotics. She was admitted again from there on 7/21/2024 due to hypotension, poor oral intake. She was found septic again and was given stress dose steroid and antibiotics. Also in rapid afib given dig. While on the floors, went into shock, started on pressors in the ICU. Condition continues to be tenuous with what appears to be hypoactive delirium. Today, condition stabilized and now able to be transferred to the [facility]  "floors. Palliative for goals of care.    Discussed case with ICU team over phone. No identified hospice diagnosis or diagnosis that carries a prognosis of 6 months or less at this point in time.     Saw patient who was awake and alert but disoriented. Daughter at bedside who requested to speak outside. Conversation as above.     Interview and exam limited by: none    Review of Systems   Unable to perform ROS: Mental status change       Past Medical History:   Diagnosis Date    Abnormal thyroid function test     last assessed: 2015     Arthritis     Caries     last assessed: 2016     Continuous opioid dependence (HCC) 2021    Edema of right lower extremity     last assessed: 2015     GERD (gastroesophageal reflux disease)     Hypertension     Medicare annual wellness visit, subsequent 2021    Positive blood culture 3/11/2022    Sarcoid      Past Surgical History:   Procedure Laterality Date     SECTION      IR NON-TUNNELED CENTRAL LINE PLACEMENT  2024    IR PICC PLACEMENT SINGLE LUMEN  2024    IR PICC PLACEMENT SINGLE LUMEN  2024    MULTIPLE TOOTH EXTRACTIONS N/A 2016    Procedure: Surgical extraction of teeth 2, 18, 19, 30, 31; incision and drainage of left subperiosteal abscess ;  Surgeon: Clara Cevallos DMD;  Location: [facility];  Service:      Social History     Socioeconomic History    Marital status: Single     Spouse name: Not on file    Number of children: Not on file    Years of education: Not on file    Highest education level: Not on file   Occupational History    Occupation: Retired: Worked for telephone company    Tobacco Use    Smoking status: Never    Smokeless tobacco: Never   Vaping Use    Vaping status: Never Used   Substance and Sexual Activity    Alcohol use: Not Currently    Drug use: No    Sexual activity: Not Currently   Other Topics Concern    Not on file   Social History Narrative     noted in \"allscripts\"  " "    Social Determinants of Health     Financial Resource Strain: Not on file   Food Insecurity: No Food Insecurity (7/22/2024)    Hunger Vital Sign     Worried About Running Out of Food in the Last Year: Never true     Ran Out of Food in the Last Year: Never true   Transportation Needs: No Transportation Needs (7/22/2024)    PRAPARE - Transportation     Lack of Transportation (Medical): No     Lack of Transportation (Non-Medical): No   Physical Activity: Not on file   Stress: Not on file   Social Connections: Not on file   Intimate Partner Violence: Not on file   Housing Stability: Low Risk  (7/22/2024)    Housing Stability Vital Sign     Unable to Pay for Housing in the Last Year: No     Number of Times Moved in the Last Year: 1     Homeless in the Last Year: No     Family History   Problem Relation Age of Onset    Asthma Mother     Stroke Mother     No Known Problems Father     No Known Problems Sister     No Known Problems Brother     No Known Problems Maternal Grandmother     No Known Problems Maternal Grandfather     No Known Problems Paternal Grandmother     No Known Problems Paternal Grandfather     Diabetes Maternal Aunt     Breast cancer Cousin     Diabetes Cousin     Breast cancer Other        MEDICATIONS / ALLERGIES:  all current active meds have been reviewed    Allergies   Allergen Reactions    Shellfish-Derived Products - Food Allergy Itching    Methotrexate Derivatives     Amoxicillin Rash    Ampicillin-Sulbactam Sodium Rash       OBJECTIVE:  /57   Pulse 70   Temp 98.1 °F (36.7 °C) (Oral)   Resp (!) 26   Ht 5' 5\" (1.651 m)   Wt 96.4 kg (212 lb 8.4 oz)   LMP  (LMP Unknown)   SpO2 98%   BMI 35.37 kg/m²   Nursing notes reviewed.  Physical Exam  Constitutional:       General: She is not in acute distress.     Appearance: She is ill-appearing. She is not toxic-appearing or diaphoretic.   HENT:      Head: Normocephalic and atraumatic.   Eyes:      General: No scleral icterus.        Right eye: " No discharge.         Left eye: No discharge.   Pulmonary:      Effort: Pulmonary effort is normal. No respiratory distress.   Abdominal:      General: Abdomen is flat. There is no distension.   Skin:     Coloration: Skin is pale. Skin is not jaundiced.   Neurological:      Mental Status: She is alert.      Comments: Alert, disoriented, only able to answer some questions         Lab Results: I have personally reviewed pertinent labs.    HEMATOLOGY PROFILE:  Results from last 7 days   Lab Units 07/29/24  0436 07/28/24  0530 07/27/24  1523 07/27/24  0417 07/26/24  0924   WBC Thousand/uL 6.18 10.72* 9.12 5.39 5.74   HEMOGLOBIN g/dL 8.2* 9.4* 9.6* 9.3* 9.5*   HEMATOCRIT % 25.5* 29.9* 31.2* 29.4* 30.0*   PLATELETS Thousands/uL 157 187 163 131* 122*   SEGS PCT % 78*  --   --  75 74   MONO PCT % 6  --   --  8 9   EOS PCT % 2  --   --  2 1       CHEMISTRY PROFILE:  Results from last 7 days   Lab Units 07/29/24  0436 07/28/24  0530 07/27/24  1523 07/25/24  1553 07/25/24  0436 07/24/24  1550 07/24/24  0436   SODIUM mmol/L 139 143 147   < > 145   < > 144   POTASSIUM mmol/L 4.1 5.1 5.4*   < > 3.2*   < > 3.1*   CHLORIDE mmol/L 106 109* 113*   < > 112*   < > 110*   CO2 mmol/L 25 26 25   < > 25   < > 24   BUN mg/dL 31* 31* 28*   < > 36*   < > 35*   CREATININE mg/dL 1.99* 2.00* 1.94*   < > 2.52*   < > 2.65*   ALBUMIN g/dL  --  2.9*  --   --  2.9*  --  3.0*   CALCIUM mg/dL 8.3* 9.2 9.6   < > 9.0   < > 8.6   ALK PHOS U/L  --  74  --   --  46  --  43   ALT U/L  --  42  --   --  37  --  30   AST U/L  --  37  --   --  41*  --  39    < > = values in this interval not displayed.       Albumin:  0   Lab Value Date/Time    ALB 2.9 (L) 07/28/2024 0530     Imaging Studies: I have personally reviewed pertinent reports.  EKG, Pathology, and Other Studies: I have personally reviewed pertinent reports.    Counseling / Coordination of Care:  Total floor / unit time spent today 60+ minutes. Greater than 50% of total time was spent with the patient  "and / or family counseling and / or coordination of care. A description of the counseling / coordination of care: obtaining/reviewing history, symptom assessment and management, medication review / adjustment, psychosocial support, reviewing / ordering tests, medicines, imaging, procedures, advanced directives, prognosis, goals of care, hospice services, supportive listening, anticipatory guidance, patient/family education, instructions for management, importance of adhering to treatment plan, risks/benefits of treatment(s), risk factor reduction, and documenting in the medical record.    Mena Oviedo MD  [facility]  [phone]    Portions of this document may have been created using dictation software and as such some \"sound alike\" terms may have been generated by the system. Do not hesitate to contact me with any questions or clarifications.    "

## 2024-07-30 NOTE — PROGRESS NOTES
[facility]  Progress Note  Name: Bhavna Al I  MRN: [id number]  Unit/Bed#: ICU 06 I Date of Admission: 7/21/2024   Date of Service: 7/30/2024 I Hospital Day: 9    Assessment & Plan   Goals of care, counseling/discussion  Assessment & Plan  Patient appears to lack the capacity to make complex medical choices on exam today.  Daughter/Rhoda is her NOK per [place] Act 169. Discussion had with her  Discussed overall trajectory of heart failure and complications of severe rheumatoid arthritis over time. The trend is that of a decline, just a matter of how quick/slow and when  She feels that the patient's overall state of health had declines at least in the past few months, with her being able to transfer from bed to chair to now mostly bed bound since January of 2024 due to her RA  Discussed that she likely will decline more, especially if her nutrition does not improve.   At this time, poor nutrition is multifactorial with dysphagia, poor appetite, delirium, medications (antibiotics) and acute illnesses in the last few month contributing. She will need time to recover from all of these to see if nutrition improves well enough that she starts to eat more.  Discussed code status and difference between Full code and DNR/DNI. Discussed that CPR will not guarantee a recovery of neuro function nor quality of life (broken bones, poorer EF, pain). DNR/DNI will not mean stopping all other treatments for whatever she is dealing with.   Discussed hospice and comfort cares at length, emphasizing that this will be to ensure comfort at end of life. At this time, I do not appreciate any diagnosis/es that will carry a prognosis of 6 months or less to qualify her for hospice cares. However, should she decline further and her nutrition does not improve, severe protein calorie malnutrition is considered a hospice diagnosis.   Daughter will think about code status some more, hoping she can speak to the patient more  about this.   SLIM/Primary team to follow up on decision.  If patient able to consent, can offer the family the 5 wishes or SL advised directive paper works. Otherwise, daughter is the NOK for surrogate medical decision making if the capacity is lost per [place] Act 169 and she is made aware and is willing to be her POA.   Palliative will follow only peripherally. Please reach out it if further assistance is needed    MRSA bacteremia  Assessment & Plan  ID on board, on oral doxycycline for planned prolonged course ideally until inflammatory markers normalized/plateau.     Acute on chronic diastolic heart failure, LVEF 65%, LVIDd 3.3 cm, AHA Stage C  Assessment & Plan  Cardio on board  Was on diuretics drip, now on PO lasix    Wt Readings from Last 3 Encounters:   07/29/24 96.4 kg (212 lb 8.4 oz)   07/11/24 108 kg (238 lb)   07/05/24 108 kg (238 lb 1.6 oz)               Rheumatoid arthritis (HCC)  Assessment & Plan  Appears to be progressively getting worse over the years, now mostly bed bound since the beginning of this year (2024)  On chronic prednisone    * encephalopathy  Assessment & Plan  Waxes and wanes . A bit better today, but not back to baseline yet per daughter  Consider geriatrics consult of delirium persists         Care Coordination  Case discussed w/ ICU    Follow-up  We appreciate the opportunity to participate in this patient's care.   We will continue to follow. PSC to follow up.  Please do not hesitate to contact our on-call provider through our clinic answering service at [phone] should there be an acute change or other symptom control concerns.    Code status: Level 1 - Full Code   Decisional apparatus:  Patient does not have capacity to make medical decisions on my exam today. If such capacity is lost, patient's substitute decision maker would default to daughter by [place] Act 169.   Advance Directive / Living Will / POLST:  none    I have reviewed the patient's controlled substance dispensing  history in the Prescription Drug Monitoring Program in compliance with the [facility] regulations before prescribing any controlled substances.    IDENTIFICATION:  Reason for Consult / Principal Problem: goals of care    HISTORY OF PRESENT ILLNESS:    Bhavna Al is a 74 y.o. female with chronic HFpEF, Afib on Eliquis, severe rheumatoid arthritis on plaquenil and prednisone. She was just admitted 1 month ago for MRSA bacteremia and spine osteomyelitis, d/c back to her long term SNF to complete IV antibiotics. She was admitted again from there on 2024 due to hypotension, poor oral intake. She was found septic again and was given stress dose steroid and antibiotics. Also in rapid afib given dig. While on the floors, went into shock, started on pressors in the ICU. Condition continues to be tenuous with what appears to be hypoactive delirium. Today, condition stabilized and now able to be transferred to the [facility] floors. Palliative for goals of care.    Discussed case with ICU team over phone. No identified hospice diagnosis or diagnosis that carries a prognosis of 6 months or less at this point in time.     Saw patient who was awake and alert but disoriented. Daughter at bedside who requested to speak outside. Conversation as above.     Interview and exam limited by: none    Review of Systems   Unable to perform ROS: Mental status change       Past Medical History:   Diagnosis Date    Abnormal thyroid function test     last assessed: 2015     Arthritis     Caries     last assessed: 2016     Continuous opioid dependence (HCC) 2021    Edema of right lower extremity     last assessed: 2015     GERD (gastroesophageal reflux disease)     Hypertension     Medicare annual wellness visit, subsequent 2021    Positive blood culture 3/11/2022    Sarcoid      Past Surgical History:   Procedure Laterality Date     SECTION      IR NON-TUNNELED CENTRAL LINE PLACEMENT  2024    IR PICC  "PLACEMENT SINGLE LUMEN  1/29/2024    IR PICC PLACEMENT SINGLE LUMEN  6/17/2024    MULTIPLE TOOTH EXTRACTIONS N/A 8/27/2016    Procedure: Surgical extraction of teeth 2, 18, 19, 30, 31; incision and drainage of left subperiosteal abscess ;  Surgeon: Clara Cevallos DMD;  Location: [facility];  Service:      Social History     Socioeconomic History    Marital status: Single     Spouse name: Not on file    Number of children: Not on file    Years of education: Not on file    Highest education level: Not on file   Occupational History    Occupation: Retired: Worked for telephone company    Tobacco Use    Smoking status: Never    Smokeless tobacco: Never   Vaping Use    Vaping status: Never Used   Substance and Sexual Activity    Alcohol use: Not Currently    Drug use: No    Sexual activity: Not Currently   Other Topics Concern    Not on file   Social History Narrative     noted in \"allscripts\"      Social Determinants of Health     Financial Resource Strain: Not on file   Food Insecurity: No Food Insecurity (7/22/2024)    Hunger Vital Sign     Worried About Running Out of Food in the Last Year: Never true     Ran Out of Food in the Last Year: Never true   Transportation Needs: No Transportation Needs (7/22/2024)    PRAPARE - Transportation     Lack of Transportation (Medical): No     Lack of Transportation (Non-Medical): No   Physical Activity: Not on file   Stress: Not on file   Social Connections: Not on file   Intimate Partner Violence: Not on file   Housing Stability: Low Risk  (7/22/2024)    Housing Stability Vital Sign     Unable to Pay for Housing in the Last Year: No     Number of Times Moved in the Last Year: 1     Homeless in the Last Year: No     Family History   Problem Relation Age of Onset    Asthma Mother     Stroke Mother     No Known Problems Father     No Known Problems Sister     No Known Problems Brother     No Known Problems Maternal Grandmother     No Known Problems Maternal Grandfather  " "   No Known Problems Paternal Grandmother     No Known Problems Paternal Grandfather     Diabetes Maternal Aunt     Breast cancer Cousin     Diabetes Cousin     Breast cancer Other        MEDICATIONS / ALLERGIES:  all current active meds have been reviewed    Allergies   Allergen Reactions    Shellfish-Derived Products - Food Allergy Itching    Methotrexate Derivatives     Amoxicillin Rash    Ampicillin-Sulbactam Sodium Rash       OBJECTIVE:  /57   Pulse 70   Temp 98.1 °F (36.7 °C) (Oral)   Resp (!) 26   Ht 5' 5\" (1.651 m)   Wt 96.4 kg (212 lb 8.4 oz)   LMP  (LMP Unknown)   SpO2 98%   BMI 35.37 kg/m²   Nursing notes reviewed.  Physical Exam  Constitutional:       General: She is not in acute distress.     Appearance: She is ill-appearing. She is not toxic-appearing or diaphoretic.   HENT:      Head: Normocephalic and atraumatic.   Eyes:      General: No scleral icterus.        Right eye: No discharge.         Left eye: No discharge.   Pulmonary:      Effort: Pulmonary effort is normal. No respiratory distress.   Abdominal:      General: Abdomen is flat. There is no distension.   Skin:     Coloration: Skin is pale. Skin is not jaundiced.   Neurological:      Mental Status: She is alert.      Comments: Alert, answers some questions appropriately, disoriented   Psychiatric:      Comments: Unable to assess fully         Lab Results: I have personally reviewed pertinent labs.    HEMATOLOGY PROFILE:  Results from last 7 days   Lab Units 07/29/24  0436 07/28/24  0530 07/27/24  1523 07/27/24  0417 07/26/24  0924   WBC Thousand/uL 6.18 10.72* 9.12 5.39 5.74   HEMOGLOBIN g/dL 8.2* 9.4* 9.6* 9.3* 9.5*   HEMATOCRIT % 25.5* 29.9* 31.2* 29.4* 30.0*   PLATELETS Thousands/uL 157 187 163 131* 122*   SEGS PCT % 78*  --   --  75 74   MONO PCT % 6  --   --  8 9   EOS PCT % 2  --   --  2 1       CHEMISTRY PROFILE:  Results from last 7 days   Lab Units 07/29/24  0436 07/28/24  0530 07/27/24  1523 07/25/24  1553 " "07/25/24  0436 07/24/24  1550 07/24/24  0436   SODIUM mmol/L 139 143 147   < > 145   < > 144   POTASSIUM mmol/L 4.1 5.1 5.4*   < > 3.2*   < > 3.1*   CHLORIDE mmol/L 106 109* 113*   < > 112*   < > 110*   CO2 mmol/L 25 26 25   < > 25   < > 24   BUN mg/dL 31* 31* 28*   < > 36*   < > 35*   CREATININE mg/dL 1.99* 2.00* 1.94*   < > 2.52*   < > 2.65*   ALBUMIN g/dL  --  2.9*  --   --  2.9*  --  3.0*   CALCIUM mg/dL 8.3* 9.2 9.6   < > 9.0   < > 8.6   ALK PHOS U/L  --  74  --   --  46  --  43   ALT U/L  --  42  --   --  37  --  30   AST U/L  --  37  --   --  41*  --  39    < > = values in this interval not displayed.       Albumin:  0   Lab Value Date/Time    ALB 2.9 (L) 07/28/2024 0530     Imaging Studies: I have personally reviewed pertinent reports.  EKG, Pathology, and Other Studies: I have personally reviewed pertinent reports.    Counseling / Coordination of Care:  Total floor / unit time spent today 60+ minutes. Greater than 50% of total time was spent with the patient and / or family counseling and / or coordination of care. A description of the counseling / coordination of care: obtaining/reviewing history, symptom assessment and management, medication review / adjustment, psychosocial support, reviewing / ordering tests, medicines, imaging, procedures, advanced directives, prognosis, goals of care, hospice services, supportive listening, anticipatory guidance, patient/family education, instructions for management, importance of adhering to treatment plan, risks/benefits of treatment(s), risk factor reduction, and documenting in the medical record.    Mena Oviedo MD  [facility]  [phone]    Portions of this document may have been created using dictation software and as such some \"sound alike\" terms may have been generated by the system. Do not hesitate to contact me with any questions or clarifications.    "

## 2024-07-30 NOTE — ASSESSMENT & PLAN NOTE
Home med is lopressor 25 mg BID  Dig loaded on 7/26-27  Digoxin levels checked this morning-within normal limits  Currently patient on IV Lopressor 5 mg every 6 hours, also digoxin 125 mcg daily  On P.O eliquis currently   Cardiology team on board-appreciate recommendations  Continuous cardiopulmonary monitoring

## 2024-07-30 NOTE — ASSESSMENT & PLAN NOTE
Patient appears to lack the capacity to make complex medical choices on exam today.  Daughter/Rhoda is her NOK per [place] Act 169. Discussion had with her  Discussed overall trajectory of heart failure and complications of severe rheumatoid arthritis over time. The trend is that of a decline, just a matter of how quick/slow and when  She feels that the patient's overall state of health had declines at least in the past few months, with her being able to transfer from bed to chair to now mostly bed bound since January of 2024 due to her RA  Discussed that she likely will decline more, especially if her nutrition does not improve.   At this time, poor nutrition is multifactorial with dysphagia, poor appetite, delirium, medications (antibiotics) and acute illnesses in the last few month contributing. She will need time to recover from all of these to see if nutrition improves well enough that she starts to eat more.  Discussed code status and difference between Full code and DNR/DNI. Discussed that CPR will not guarantee a recovery of neuro function nor quality of life (broken bones, poorer EF, pain). DNR/DNI will not mean stopping all other treatments for whatever she is dealing with.   Discussed hospice and comfort cares at length, emphasizing that this will be to ensure comfort at end of life. At this time, I do not appreciate any diagnosis/es that will carry a prognosis of 6 months or less to qualify her for hospice cares. However, should she decline further and her nutrition does not improve, severe protein calorie malnutrition is considered a hospice diagnosis.   Daughter will think about code status some more, hoping she can speak to the patient more about this.   SLIM/Primary team to follow up on decision.  If patient able to consent, can offer the family the 5 wishes or SL advised directive paper works. Otherwise, daughter is the NOK for surrogate medical decision making if the capacity is lost per [place] Act 169 and  she is made aware and is willing to be her POA.   Palliative will follow only peripherally. Please reach out it if further assistance is needed

## 2024-07-30 NOTE — ASSESSMENT & PLAN NOTE
Wt Readings from Last 3 Encounters:   07/29/24 96.4 kg (212 lb 8.4 oz)   07/11/24 108 kg (238 lb)   07/05/24 108 kg (238 lb 1.6 oz)     Pulmonary edema likely due to Afib RVR   At [place] she is on Lasix 20 mg daily.   Echo 7/22/2024 shows left ventricular ejection fraction is >75%. Systolic function is hyperdynamic. Unable to assess diastolic function due to the degree of mitral annular calcification.   Given 1 dose of IV Lasix 7/23 after which she diuresed only 300 ml.  Given Bumex 4 mg 7/28  after which she made a UOP of 1.5 L.  Further dosing of IV diuretic was held 7/29/24 per cardiology recommendation  Overnight low urine out put received IV lasix 10 mg     Plan:   Resume lasix 20 mg daily on discharge  Appreciate cardiology team's recommendations

## 2024-07-30 NOTE — ASSESSMENT & PLAN NOTE
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  Repeat echo does not show any valvular vegetation.  Blood cultures negative at 72 hours.    Plan  Continue doxycycline IV , may switch to PO once pt tolerates oral meds until inflammatory markers stabilize/normalize in the outpatient setting. May take several weeks.  ID following

## 2024-07-30 NOTE — PLAN OF CARE
Problem: INFECTION - ADULT  Goal: Absence or prevention of progression during hospitalization  Description: INTERVENTIONS:  - Assess and monitor for signs and symptoms of infection  - Monitor lab/diagnostic results  - Monitor all insertion sites, i.e. indwelling lines, tubes, and drains  - Monitor endotracheal if appropriate and nasal secretions for changes in amount and color  - Pana appropriate cooling/warming therapies per order  - Administer medications as ordered  - Instruct and encourage patient and family to use good hand hygiene technique  - Identify and instruct in appropriate isolation precautions for identified infection/condition  Outcome: Progressing     Problem: SAFETY ADULT  Goal: Patient will remain free of falls  Description: INTERVENTIONS:  - Educate patient/family on patient safety including physical limitations  - Instruct patient to call for assistance with activity   - Consult OT/PT to assist with strengthening/mobility   - Keep Call bell within reach  - Keep bed low and locked with side rails adjusted as appropriate  - Keep care items and personal belongings within reach  - Initiate and maintain comfort rounds  - Make Fall Risk Sign visible to staff  - Offer Toileting   - Apply yellow socks and bracelet for high fall risk patients  - Consider moving patient to room near nurses station  Outcome: Progressing  Goal: Maintain or return to baseline ADL function  Description: INTERVENTIONS:  -  Assess patient's ability to carry out ADLs; assess patient's baseline for ADL function and identify physical deficits which impact ability to perform ADLs (bathing, care of mouth/teeth, toileting, grooming, dressing, etc.)  - Assess/evaluate cause of self-care deficits   - Assess range of motion  - Assess patient's mobility; develop plan if impaired  - Assess patient's need for assistive devices and provide as appropriate  - Encourage maximum independence but intervene and supervise when necessary  -  Involve family in performance of ADLs  - Assess for home care needs following discharge   - Consider OT consult to assist with ADL evaluation and planning for discharge  - Provide patient education as appropriate  Outcome: Progressing  Goal: Maintains/Returns to pre admission functional level  Description: INTERVENTIONS:  - Perform AM-PAC 6 Click Basic Mobility/ Daily Activity assessment daily.  - Set and communicate daily mobility goal to care team and patient/family/caregiver.   - Collaborate with rehabilitation services on mobility goals if consulted  - Perform Range of Motion   - Out of bed for toileting  - Record patient progress and toleration of activity level   Outcome: Progressing     Problem: CARDIOVASCULAR - ADULT  Goal: Maintains optimal cardiac output and hemodynamic stability  Description: INTERVENTIONS:  - Monitor I/O, vital signs and rhythm  - Monitor for S/S and trends of decreased cardiac output  - Administer and titrate ordered vasoactive medications to optimize hemodynamic stability  - Assess quality of pulses, skin color and temperature  - Assess for signs of decreased coronary artery perfusion  - Instruct patient to report change in severity of symptoms  Outcome: Progressing  Goal: Absence of cardiac dysrhythmias or at baseline rhythm  Description: INTERVENTIONS:  - Continuous cardiac monitoring, vital signs, obtain 12 lead EKG if ordered  - Administer antiarrhythmic and heart rate control medications as ordered  - Monitor electrolytes and administer replacement therapy as ordered  Outcome: Progressing     Problem: RESPIRATORY - ADULT  Goal: Achieves optimal ventilation and oxygenation  Description: INTERVENTIONS:  - Assess for changes in respiratory status  - Assess for changes in mentation and behavior  - Position to facilitate oxygenation and minimize respiratory effort  - Oxygen administered by appropriate delivery if ordered  - Initiate smoking cessation education as indicated  - Encourage  broncho-pulmonary hygiene including cough, deep breathe, Incentive Spirometry  - Assess the need for suctioning and aspirate as needed  - Assess and instruct to report SOB or any respiratory difficulty  - Respiratory Therapy support as indicated  Outcome: Progressing     Problem: METABOLIC, FLUID AND ELECTROLYTES - ADULT  Goal: Electrolytes maintained within normal limits  Description: INTERVENTIONS:  - Monitor labs and assess patient for signs and symptoms of electrolyte imbalances  - Administer electrolyte replacement as ordered  - Monitor response to electrolyte replacements, including repeat lab results as appropriate  - Instruct patient on fluid and nutrition as appropriate  Outcome: Progressing  Goal: Fluid balance maintained  Description: INTERVENTIONS:  - Monitor labs   - Monitor I/O and WT  - Instruct patient on fluid and nutrition as appropriate  - Assess for signs & symptoms of volume excess or deficit  Outcome: Progressing  Goal: Glucose maintained within target range  Description: INTERVENTIONS:  - Monitor Blood Glucose as ordered  - Assess for signs and symptoms of hyperglycemia and hypoglycemia  - Administer ordered medications to maintain glucose within target range  - Assess nutritional intake and initiate nutrition service referral as needed  Outcome: Progressing     Problem: HEMATOLOGIC - ADULT  Goal: Maintains hematologic stability  Description: INTERVENTIONS  - Assess for signs and symptoms of bleeding or hemorrhage  - Monitor labs  - Administer supportive blood products/factors as ordered and appropriate  Outcome: Progressing     Problem: Nutrition/Hydration-ADULT  Goal: Nutrient/Hydration intake appropriate for improving, restoring or maintaining nutritional needs  Description: Monitor and assess patient's nutrition/hydration status for malnutrition. Collaborate with interdisciplinary team and initiate plan and interventions as ordered.  Monitor patient's weight and dietary intake as ordered  or per policy. Utilize nutrition screening tool and intervene as necessary. Determine patient's food preferences and provide high-protein, high-caloric foods as appropriate.     INTERVENTIONS:  - Monitor oral intake, urinary output, labs, and treatment plans  - Assess nutrition and hydration status and recommend course of action  - Evaluate amount of meals eaten  - Assist patient with eating if necessary   - Allow adequate time for meals  - Recommend/ encourage appropriate diets, oral nutritional supplements, and vitamin/mineral supplements  - Order, calculate, and assess calorie counts as needed  - Recommend, monitor, and adjust tube feedings and TPN/PPN based on assessed needs  - Assess need for intravenous fluids  - Provide specific nutrition/hydration education as appropriate  - Include patient/family/caregiver in decisions related to nutrition  Outcome: Progressing

## 2024-07-30 NOTE — WOUND OSTOMY CARE
Progress Note - Wound   Bhavna P Mikaela 74 y.o. female MRN: [id number]  Unit/Bed#: ICU 06 Encounter: [id number]        Assessment:   Patient seen today for wound care follow up assessment. Patient is in ICU, on critical care low air loss mattress, ATR turning system in place. Patient is incontinent of bowel and has indwelling peralta catheter. Patient is dependent for all care needs including feeding.    B/L heels intact and blanching, preventative skin care orders placed.     MASD B/L breasts and abdomen- all areas are intact, hyperpigmentation continues, recommending to continue interdry for continued management of hyperpigmentation and moisture.  Right arm- RESOLVED   MASD sacral slit- small area of partial thickness skin loss with pink tissue and fragile periwound, scant serosanguineous drainage, slight improvement with this assessment.  POA evolving DTI/MASD posterior thighs- scattered areas of partial thickness skin loss with pink tissue and noted nonblanchable erythema, periwound with fragile pink scar tissue, scant serosanguineous drainage present.    No induration, fluctuance, odor, warmth/temperature differences, redness, or purulence noted to the above noted wounds and skin areas assessed. New dressings applied per orders listed below. Patient tolerated well- no s/s of non-verbal pain or discomfort observed during the encounter. Bedside nurse aware of plan of care. See flow sheets for more detailed assessment findings.  Wound care will continue to follow, call or Secure Chat Message with questions.     Wound Care Plan:   1-Apply silicone bordered foam dressings to bilateral heels for prevention.  Change dressings every 3 days and as needed.  2-Elevate heels off of bed/chair surface to offload pressure.  3-Offloading air cushion in chair when out of bed, if able.  4-Apply lotion to body twice daily and as needed.  5-Turn/reposition every 2 hours while in bed and weight shift frequently while in chair for pressure  re-distribution on skin.   6-Buttock/sacrum/posterior thighs--cleanse with soap and water, pat dry.  Apply Zinc Oxide/Calazime paste three times daily and as needed with incontinence care.  7-Bilateral breast and right medial thigh creases--cleanse with soap and water, pat dry.  Apply Interdry in single layer to skin fold, ensure approximately 2 inches of fabric is visible outside of fold.  Remove Interdry daily for bathing and re-apply.  Change Interdry sheet every other day or if visibly soiled.  DO NOT USE CREAMS OR POWDERS IN AREAS WHERE INTERDRY IS IN USE.      Wound 07/21/24 MASD Sacrum Mid (Active)   Wound Image   07/30/24 0904   Wound Description Beefy red;Fragile 07/30/24 0904   Pressure Injury Stage 1 07/30/24 0710   Maia-wound Assessment Fragile 07/30/24 0904   Wound Length (cm) 1 cm 07/30/24 0904   Wound Width (cm) 0.5 cm 07/30/24 0904   Wound Depth (cm) 0.1 cm 07/30/24 0904   Wound Surface Area (cm^2) 0.5 cm^2 07/30/24 0904   Wound Volume (cm^3) 0.05 cm^3 07/30/24 0904   Calculated Wound Volume (cm^3) 0.05 cm^3 07/30/24 0904   Change in Wound Size % 54.55 07/30/24 0904   Wound Site Closure TANYA 07/30/24 0904   Drainage Amount Scant 07/30/24 0904   Drainage Description Serosanguineous 07/30/24 0904   Non-staged Wound Description Partial thickness 07/30/24 0904   Treatments Cleansed 07/30/24 0904   Dressing Moisture barrier 07/30/24 0904   Wound packed? No 07/30/24 0904   Packing- # removed 0 07/30/24 0904   Packing- # inserted 0 07/30/24 0904   Dressing Changed New 07/30/24 0904   Patient Tolerance Tolerated well 07/30/24 0904   Dressing Status Clean;Dry;Intact 07/30/24 0904       Wound 07/29/24 Thigh Left;Posterior;Proximal (Active)   Wound Image   07/30/24 0907   Wound Description Epithelialization;Fragile;Pink 07/30/24 0907   Maia-wound Assessment Fragile;Scar Tissue 07/30/24 0907   Wound Length (cm) 5 cm 07/30/24 0907   Wound Width (cm) 5 cm 07/30/24 0907   Wound Depth (cm) 0.1 cm 07/30/24 0907    Wound Surface Area (cm^2) 25 cm^2 07/30/24 0907   Wound Volume (cm^3) 2.5 cm^3 07/30/24 0907   Calculated Wound Volume (cm^3) 2.5 cm^3 07/30/24 0907   Wound Site Closure TANYA 07/30/24 0907   Drainage Amount Scant 07/30/24 0907   Drainage Description Serosanguineous 07/30/24 0907   Non-staged Wound Description Partial thickness 07/30/24 0907   Treatments Cleansed 07/30/24 0907   Dressing Moisture barrier 07/30/24 0907   Wound packed? No 07/30/24 0907   Packing- # removed 0 07/30/24 0907   Packing- # inserted 0 07/30/24 0907   Dressing Changed New 07/30/24 0907   Patient Tolerance Tolerated well 07/30/24 0907   Dressing Status Clean;Intact;Dry 07/30/24 0907         Delisa Jacinto BSN, RN, CWOCN

## 2024-07-30 NOTE — ASSESSMENT & PLAN NOTE
ID on board, on oral doxycycline for planned prolonged course ideally until inflammatory markers normalized/plateau.

## 2024-07-30 NOTE — ASSESSMENT & PLAN NOTE
Received a DI score on this patient patient 25 July 2024  Acute encephalopathy possibly secondary to worsening hypoactive delirium  Transferred to the ICU for closer monitoring  Patient currently more , awake an alert, oriented, but fatigued and reluctant to talk  Ammonia levels within normal limits, VBG did not show hypercarbia  7/29-passed speech and swallow evaluation yesterday with dysphagia diet.  All medications were changed to PO but patient has not been taking much orally so medications were switched to IV last night  Plan- continue to monitor, can be downgraded to [facility]

## 2024-07-30 NOTE — CASE MANAGEMENT
Case Management Discharge Planning Note    Patient name Bhavna Al  Location ICU 06/ICU 06 MRN [id number]  : 1950 Date 2024       Current Admission Date: 2024  Current Admission Diagnosis:encephalopathy   Patient Active Problem List    Diagnosis Date Noted Date Diagnosed    encephalopathy 2024     Elevated troponin 2024     Sepsis (Former[facility]) 2024     Acute kidney injury superimposed on chronic kidney disease  (HCC) 2024     Hypokalemia 2024     Chronic pruritic rash in adult 2024     Secondary adrenal insufficiency ([facility]) 2024     Pressure ulcer of left buttock, stage 3 ([facility]ly [facility]) 2024     Acute osteomyelitis of thoracic spine ([facility]) 2024     MRSA bacteremia 2024     Hypotension 2024     Dysphagia 2024     Deep tissue injury 2024     Hypernatremia 2024     Localized swelling on left hand 2023     Stage 3 chronic kidney disease ([facility]) 2023     Febrile illness 2023     Dermatitis associated with incontinence 2023     Right shoulder pain 12/15/2022     Abnormal urinalysis 2022     Ambulatory dysfunction 2022     Hyperuricemia 2022     Acute metabolic encephalopathy 2022     Acute on chronic diastolic heart failure, LVEF 65%, LVIDd 3.3 cm, AHA Stage C 2022     Acute bronchitis 2022     Assistance needed with transportation 09/15/2022     Abnormal CT of the chest 2022     Gram-positive cocci bacteremia 2022     Psoriasis 2022     COVID-19 2022     Shock ([facility]) 01/10/2022     Persistent atrial fibrillation 01/10/2022     Hyperparathyroidism ([facility]) 2021     Murmur, cardiac 2021     BPPV (benign paroxysmal positional vertigo) 2018     Seasonal allergic rhinitis due to pollen 2018     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     Osteoporosis 2016     Leukopenia 2016     Rheumatoid arthritis ([facility]) 2016      GERD (gastroesophageal reflux disease) 08/23/2016     Sarcoid 08/23/2016     Morbid obesity (HCC) 07/12/2016     Vitamin D deficiency 07/16/2015     Essential hypertension 12/04/2014     Lumbar radiculopathy 12/04/2014       LOS (days): 9  Geometric Mean LOS (GMLOS) (days): 5.1  Days to GMLOS:-4     OBJECTIVE:  Risk of Unplanned Readmission Score: 40.95       Current admission status: Inpatient   Preferred Pharmacy:   [id number] - [place], [place] - [address]  [address]  [place] [address]  Phone: [phone] Fax: [phone]    [facility] - [place], [place] - [address] [place] [address]  Phone: [phone] Fax: [phone]    Primary Care Provider: Nya Taveras DO    Primary Insurance: MEDICARE  Secondary Insurance: [facility]    DISCHARGE DETAILS:    Discharge planning discussed with:: daughter Teresa Al  Freedom of Choice: Yes  Comments - Freedom of Choice: Facility [facility]  CM contacted family/caregiver?: No- see comments (met with daughter in person)     Did patient/caregiver verbalize understanding of patient care needs?: N/A- going to facility  Were patient/caregiver advised of the risks associated with not following Treatment Team discharge recommendations?: Yes    Contacts  Patient Contacts: Rhoda Al (Dtr)  Relationship to Patient:: Family  Contact Method: In Person  Reason/Outcome: Discharge Planning, Continuity of Care, Emergency Contact    Additional Comments: CM met with daughters to discuss patient's return to the [facility]. Daughters did not want patient to return to the facility because they felt she was not getting the care she needs. CM reviewed the 15 day Medicare bed hold and informed family the patient may need to return to the facility based on the Medicare rules. Family was instructed to address the issue of patient's care with the facility . Family verbalized  understanding. CM will continue to follow the patient through disposition and will address family questions as they arise.                       done

## 2024-07-30 NOTE — PROGRESS NOTES
Patient refusing further attempts at vascular access despite re-education on the importance. Pt. Currently has limited IV access. Critical care team aware.

## 2024-07-31 LAB
ALBUMIN SERPL BCG-MCNC: 2.6 G/DL (ref 3.5–5)
ALP SERPL-CCNC: 86 U/L (ref 34–104)
ALT SERPL W P-5'-P-CCNC: 58 U/L (ref 7–52)
ANION GAP SERPL CALCULATED.3IONS-SCNC: 9 MMOL/L (ref 4–13)
AST SERPL W P-5'-P-CCNC: 74 U/L (ref 13–39)
BASOPHILS # BLD AUTO: 0.02 THOUSANDS/ÂΜL (ref 0–0.1)
BASOPHILS NFR BLD AUTO: 0 % (ref 0–1)
BILIRUB SERPL-MCNC: 0.81 MG/DL (ref 0.2–1)
BUN SERPL-MCNC: 31 MG/DL (ref 5–25)
CA-I BLD-SCNC: 1.12 MMOL/L (ref 1.12–1.32)
CALCIUM ALBUM COR SERPL-MCNC: 10 MG/DL (ref 8.3–10.1)
CALCIUM SERPL-MCNC: 8.9 MG/DL (ref 8.4–10.2)
CHLORIDE SERPL-SCNC: 108 MMOL/L (ref 96–108)
CO2 SERPL-SCNC: 24 MMOL/L (ref 21–32)
CREAT SERPL-MCNC: 1.74 MG/DL (ref 0.6–1.3)
EOSINOPHIL # BLD AUTO: 0.14 THOUSAND/ÂΜL (ref 0–0.61)
EOSINOPHIL NFR BLD AUTO: 3 % (ref 0–6)
ERYTHROCYTE [DISTWIDTH] IN BLOOD BY AUTOMATED COUNT: 18.1 % (ref 11.6–15.1)
GFR SERPL CREATININE-BSD FRML MDRD: 28 ML/MIN/1.73SQ M
GLUCOSE SERPL-MCNC: 56 MG/DL (ref 65–140)
GLUCOSE SERPL-MCNC: 62 MG/DL (ref 65–140)
GLUCOSE SERPL-MCNC: 85 MG/DL (ref 65–140)
GLUCOSE SERPL-MCNC: 90 MG/DL (ref 65–140)
HCT VFR BLD AUTO: 28.6 % (ref 34.8–46.1)
HGB BLD-MCNC: 9 G/DL (ref 11.5–15.4)
IMM GRANULOCYTES # BLD AUTO: 0.08 THOUSAND/UL (ref 0–0.2)
IMM GRANULOCYTES NFR BLD AUTO: 2 % (ref 0–2)
LYMPHOCYTES # BLD AUTO: 1.44 THOUSANDS/ÂΜL (ref 0.6–4.47)
LYMPHOCYTES NFR BLD AUTO: 31 % (ref 14–44)
MAGNESIUM SERPL-MCNC: 1.7 MG/DL (ref 1.9–2.7)
MCH RBC QN AUTO: 28.6 PG (ref 26.8–34.3)
MCHC RBC AUTO-ENTMCNC: 31.5 G/DL (ref 31.4–37.4)
MCV RBC AUTO: 91 FL (ref 82–98)
MONOCYTES # BLD AUTO: 0.33 THOUSAND/ÂΜL (ref 0.17–1.22)
MONOCYTES NFR BLD AUTO: 7 % (ref 4–12)
NEUTROPHILS # BLD AUTO: 2.67 THOUSANDS/ÂΜL (ref 1.85–7.62)
NEUTS SEG NFR BLD AUTO: 57 % (ref 43–75)
NRBC BLD AUTO-RTO: 1 /100 WBCS
PHOSPHATE SERPL-MCNC: 3.3 MG/DL (ref 2.3–4.1)
PLATELET # BLD AUTO: 137 THOUSANDS/UL (ref 149–390)
PMV BLD AUTO: 13.8 FL (ref 8.9–12.7)
POTASSIUM SERPL-SCNC: 4.5 MMOL/L (ref 3.5–5.3)
PROCALCITONIN SERPL-MCNC: 1.28 NG/ML
PROT SERPL-MCNC: 5.5 G/DL (ref 6.4–8.4)
RBC # BLD AUTO: 3.15 MILLION/UL (ref 3.81–5.12)
SODIUM SERPL-SCNC: 141 MMOL/L (ref 135–147)
WBC # BLD AUTO: 4.68 THOUSAND/UL (ref 4.31–10.16)

## 2024-07-31 PROCEDURE — 99232 SBSQ HOSP IP/OBS MODERATE 35: CPT | Performed by: STUDENT IN AN ORGANIZED HEALTH CARE EDUCATION/TRAINING PROGRAM

## 2024-07-31 PROCEDURE — 82948 REAGENT STRIP/BLOOD GLUCOSE: CPT

## 2024-07-31 PROCEDURE — 99232 SBSQ HOSP IP/OBS MODERATE 35: CPT | Performed by: INTERNAL MEDICINE

## 2024-07-31 PROCEDURE — 84145 PROCALCITONIN (PCT): CPT | Performed by: STUDENT IN AN ORGANIZED HEALTH CARE EDUCATION/TRAINING PROGRAM

## 2024-07-31 PROCEDURE — 80053 COMPREHEN METABOLIC PANEL: CPT | Performed by: STUDENT IN AN ORGANIZED HEALTH CARE EDUCATION/TRAINING PROGRAM

## 2024-07-31 PROCEDURE — 84100 ASSAY OF PHOSPHORUS: CPT | Performed by: STUDENT IN AN ORGANIZED HEALTH CARE EDUCATION/TRAINING PROGRAM

## 2024-07-31 PROCEDURE — 82330 ASSAY OF CALCIUM: CPT | Performed by: STUDENT IN AN ORGANIZED HEALTH CARE EDUCATION/TRAINING PROGRAM

## 2024-07-31 PROCEDURE — 83735 ASSAY OF MAGNESIUM: CPT | Performed by: STUDENT IN AN ORGANIZED HEALTH CARE EDUCATION/TRAINING PROGRAM

## 2024-07-31 PROCEDURE — 85025 COMPLETE CBC W/AUTO DIFF WBC: CPT | Performed by: STUDENT IN AN ORGANIZED HEALTH CARE EDUCATION/TRAINING PROGRAM

## 2024-07-31 RX ADMIN — DOXYCYCLINE 100 MG: 100 CAPSULE ORAL at 09:28

## 2024-07-31 RX ADMIN — APIXABAN 5 MG: 5 TABLET, FILM COATED ORAL at 09:37

## 2024-07-31 RX ADMIN — PANTOPRAZOLE SODIUM 40 MG: 40 TABLET, DELAYED RELEASE ORAL at 06:48

## 2024-07-31 RX ADMIN — PREDNISONE 5 MG: 5 TABLET ORAL at 18:37

## 2024-07-31 RX ADMIN — APIXABAN 5 MG: 5 TABLET, FILM COATED ORAL at 18:37

## 2024-07-31 RX ADMIN — DOXYCYCLINE 100 MG: 100 CAPSULE ORAL at 21:41

## 2024-07-31 RX ADMIN — Medication: at 18:37

## 2024-07-31 RX ADMIN — METOPROLOL TARTRATE 25 MG: 25 TABLET, FILM COATED ORAL at 09:28

## 2024-07-31 RX ADMIN — Medication: at 09:37

## 2024-07-31 RX ADMIN — PREDNISONE 5 MG: 5 TABLET ORAL at 09:28

## 2024-07-31 RX ADMIN — FUROSEMIDE 20 MG: 20 TABLET ORAL at 09:28

## 2024-07-31 NOTE — ASSESSMENT & PLAN NOTE
Continue home regimen of lopressor 25 mg BID, Hrs controlled on this regimen  Dig loaded on 7/26-27  Continue Eliquis for AC  Cardiology team on board-appreciate recommendations  Continuous cardiopulmonary monitoring

## 2024-07-31 NOTE — ASSESSMENT & PLAN NOTE
Wt Readings from Last 3 Encounters:   07/31/24 95.2 kg (209 lb 14.1 oz)   07/11/24 108 kg (238 lb)   07/05/24 108 kg (238 lb 1.6 oz)     7/22 2D echo revealed ejection fraction greater than 75%, wall motion normal  Cardiology following  Was on diuretic gtt however now on po lasix with improvement in weights  Continue daily weights, I&Os

## 2024-07-31 NOTE — PLAN OF CARE
Problem: Prexisting or High Potential for Compromised Skin Integrity  Goal: Skin integrity is maintained or improved  Description: INTERVENTIONS:  - Identify patients at risk for skin breakdown  - Assess and monitor skin integrity  - Assess and monitor nutrition and hydration status  - Monitor labs   - Assess for incontinence   - Turn and reposition patient  - Assist with mobility/ambulation  - Relieve pressure over bony prominences  - Avoid friction and shearing  - Provide appropriate hygiene as needed including keeping skin clean and dry  - Evaluate need for skin moisturizer/barrier cream  - Collaborate with interdisciplinary team   - Patient/family teaching  - Consider wound care consult   Outcome: Progressing     Problem: PAIN - ADULT  Goal: Verbalizes/displays adequate comfort level or baseline comfort level  Description: Interventions:  - Encourage patient to monitor pain and request assistance  - Assess pain using appropriate pain scale  - Administer analgesics based on type and severity of pain and evaluate response  - Implement non-pharmacological measures as appropriate and evaluate response  - Consider cultural and social influences on pain and pain management  - Notify physician/advanced practitioner if interventions unsuccessful or patient reports new pain  Outcome: Progressing     Problem: INFECTION - ADULT  Goal: Absence or prevention of progression during hospitalization  Description: INTERVENTIONS:  - Assess and monitor for signs and symptoms of infection  - Monitor lab/diagnostic results  - Monitor all insertion sites, i.e. indwelling lines, tubes, and drains  - Monitor endotracheal if appropriate and nasal secretions for changes in amount and color  - [place] appropriate cooling/warming therapies per order  - Administer medications as ordered  - Instruct and encourage patient and family to use good hand hygiene technique  - Identify and instruct in appropriate isolation precautions for  identified infection/condition  Outcome: Progressing     Problem: SAFETY ADULT  Goal: Patient will remain free of falls  Description: INTERVENTIONS:  - Educate patient/family on patient safety including physical limitations  - Instruct patient to call for assistance with activity   - Consult OT/PT to assist with strengthening/mobility   - Keep Call bell within reach  - Keep bed low and locked with side rails adjusted as appropriate  - Keep care items and personal belongings within reach  - Initiate and maintain comfort rounds  - Make Fall Risk Sign visible to staff  - Offer Toileting every  Hours, in advance of need  - Initiate/Maintainalarm  - Obtain necessary fall risk management equipment:   - Apply yellow socks and bracelet for high fall risk patients  - Consider moving patient to room near nurses station  Outcome: Progressing  Goal: Maintain or return to baseline ADL function  Description: INTERVENTIONS:  -  Assess patient's ability to carry out ADLs; assess patient's baseline for ADL function and identify physical deficits which impact ability to perform ADLs (bathing, care of mouth/teeth, toileting, grooming, dressing, etc.)  - Assess/evaluate cause of self-care deficits   - Assess range of motion  - Assess patient's mobility; develop plan if impaired  - Assess patient's need for assistive devices and provide as appropriate  - Encourage maximum independence but intervene and supervise when necessary  - Involve family in performance of ADLs  - Assess for home care needs following discharge   - Consider OT consult to assist with ADL evaluation and planning for discharge  - Provide patient education as appropriate  Outcome: Progressing  Goal: Maintains/Returns to pre admission functional level  Description: INTERVENTIONS:  - Perform AM-PAC 6 Click Basic Mobility/ Daily Activity assessment daily.  - Set and communicate daily mobility goal to care team and patient/family/caregiver.   - Collaborate with  rehabilitation services on mobility goals if consulted  - Perform Range of Motion  times a day.  - Reposition patient every hours.  - Dangle patient  times a day  - Stand patient  times a day  - Ambulate patient  times a day  - Out of bed to chair  times a day   - Out of bed for meals  times a day  - Out of bed for toileting  - Record patient progress and toleration of activity level   Outcome: Progressing     Problem: DISCHARGE PLANNING  Goal: Discharge to home or other facility with appropriate resources  Description: INTERVENTIONS:  - Identify barriers to discharge w/patient and caregiver  - Arrange for needed discharge resources and transportation as appropriate  - Identify discharge learning needs (meds, wound care, etc.)  - Arrange for interpretive services to assist at discharge as needed  - Refer to Case Management Department for coordinating discharge planning if the patient needs post-hospital services based on physician/advanced practitioner order or complex needs related to functional status, cognitive ability, or social support system  Outcome: Progressing     Problem: Knowledge Deficit  Goal: Patient/family/caregiver demonstrates understanding of disease process, treatment plan, medications, and discharge instructions  Description: Complete learning assessment and assess knowledge base.  Interventions:  - Provide teaching at level of understanding  - Provide teaching via preferred learning methods  Outcome: Progressing     Problem: CARDIOVASCULAR - ADULT  Goal: Maintains optimal cardiac output and hemodynamic stability  Description: INTERVENTIONS:  - Monitor I/O, vital signs and rhythm  - Monitor for S/S and trends of decreased cardiac output  - Administer and titrate ordered vasoactive medications to optimize hemodynamic stability  - Assess quality of pulses, skin color and temperature  - Assess for signs of decreased coronary artery perfusion  - Instruct patient to report change in severity of  symptoms  Outcome: Progressing  Goal: Absence of cardiac dysrhythmias or at baseline rhythm  Description: INTERVENTIONS:  - Continuous cardiac monitoring, vital signs, obtain 12 lead EKG if ordered  - Administer antiarrhythmic and heart rate control medications as ordered  - Monitor electrolytes and administer replacement therapy as ordered  Outcome: Progressing     Problem: RESPIRATORY - ADULT  Goal: Achieves optimal ventilation and oxygenation  Description: INTERVENTIONS:  - Assess for changes in respiratory status  - Assess for changes in mentation and behavior  - Position to facilitate oxygenation and minimize respiratory effort  - Oxygen administered by appropriate delivery if ordered  - Initiate smoking cessation education as indicated  - Encourage broncho-pulmonary hygiene including cough, deep breathe, Incentive Spirometry  - Assess the need for suctioning and aspirate as needed  - Assess and instruct to report SOB or any respiratory difficulty  - Respiratory Therapy support as indicated  Outcome: Progressing     Problem: METABOLIC, FLUID AND ELECTROLYTES - ADULT  Goal: Electrolytes maintained within normal limits  Description: INTERVENTIONS:  - Monitor labs and assess patient for signs and symptoms of electrolyte imbalances  - Administer electrolyte replacement as ordered  - Monitor response to electrolyte replacements, including repeat lab results as appropriate  - Instruct patient on fluid and nutrition as appropriate  Outcome: Progressing  Goal: Fluid balance maintained  Description: INTERVENTIONS:  - Monitor labs   - Monitor I/O and WT  - Instruct patient on fluid and nutrition as appropriate  - Assess for signs & symptoms of volume excess or deficit  Outcome: Progressing  Goal: Glucose maintained within target range  Description: INTERVENTIONS:  - Monitor Blood Glucose as ordered  - Assess for signs and symptoms of hyperglycemia and hypoglycemia  - Administer ordered medications to maintain glucose  within target range  - Assess nutritional intake and initiate nutrition service referral as needed  Outcome: Progressing     Problem: HEMATOLOGIC - ADULT  Goal: Maintains hematologic stability  Description: INTERVENTIONS  - Assess for signs and symptoms of bleeding or hemorrhage  - Monitor labs  - Administer supportive blood products/factors as ordered and appropriate  Outcome: Progressing     Problem: Nutrition/Hydration-ADULT  Goal: Nutrient/Hydration intake appropriate for improving, restoring or maintaining nutritional needs  Description: Monitor and assess patient's nutrition/hydration status for malnutrition. Collaborate with interdisciplinary team and initiate plan and interventions as ordered.  Monitor patient's weight and dietary intake as ordered or per policy. Utilize nutrition screening tool and intervene as necessary. Determine patient's food preferences and provide high-protein, high-caloric foods as appropriate.     INTERVENTIONS:  - Monitor oral intake, urinary output, labs, and treatment plans  - Assess nutrition and hydration status and recommend course of action  - Evaluate amount of meals eaten  - Assist patient with eating if necessary   - Allow adequate time for meals  - Recommend/ encourage appropriate diets, oral nutritional supplements, and vitamin/mineral supplements  - Order, calculate, and assess calorie counts as needed  - Recommend, monitor, and adjust tube feedings and TPN/PPN based on assessed needs  - Assess need for intravenous fluids  - Provide specific nutrition/hydration education as appropriate  - Include patient/family/caregiver in decisions related to nutrition  Outcome: Progressing

## 2024-07-31 NOTE — ASSESSMENT & PLAN NOTE
Acute encephalopathy possibly secondary to worsening hypoactive delirium  Transferred to the ICU for closer monitoring  Patient currently more , awake an alert, oriented, but fatigued and reluctant to talk  Ammonia levels within normal limits, VBG did not show hypercarbia  7/29-passed speech and swallow evaluation with dysphagia diet.  All medications were changed to PO

## 2024-07-31 NOTE — PLAN OF CARE
Problem: Prexisting or High Potential for Compromised Skin Integrity  Goal: Skin integrity is maintained or improved  Description: INTERVENTIONS:  - Identify patients at risk for skin breakdown  - Assess and monitor skin integrity  - Assess and monitor nutrition and hydration status  - Monitor labs   - Assess for incontinence   - Turn and reposition patient  - Assist with mobility/ambulation  - Relieve pressure over bony prominences  - Avoid friction and shearing  - Provide appropriate hygiene as needed including keeping skin clean and dry  - Evaluate need for skin moisturizer/barrier cream  - Collaborate with interdisciplinary team   - Patient/family teaching  - Consider wound care consult   Outcome: Progressing     Problem: PAIN - ADULT  Goal: Verbalizes/displays adequate comfort level or baseline comfort level  Description: Interventions:  - Encourage patient to monitor pain and request assistance  - Assess pain using appropriate pain scale  - Administer analgesics based on type and severity of pain and evaluate response  - Implement non-pharmacological measures as appropriate and evaluate response  - Consider cultural and social influences on pain and pain management  - Notify physician/advanced practitioner if interventions unsuccessful or patient reports new pain  Outcome: Progressing     Problem: INFECTION - ADULT  Goal: Absence or prevention of progression during hospitalization  Description: INTERVENTIONS:  - Assess and monitor for signs and symptoms of infection  - Monitor lab/diagnostic results  - Monitor all insertion sites, i.e. indwelling lines, tubes, and drains  - Monitor endotracheal if appropriate and nasal secretions for changes in amount and color  - [place] appropriate cooling/warming therapies per order  - Administer medications as ordered  - Instruct and encourage patient and family to use good hand hygiene technique  - Identify and instruct in appropriate isolation precautions for  identified infection/condition  Outcome: Progressing     Problem: SAFETY ADULT  Goal: Patient will remain free of falls  Description: INTERVENTIONS:  - Educate patient/family on patient safety including physical limitations  - Instruct patient to call for assistance with activity   - Consult OT/PT to assist with strengthening/mobility   - Keep Call bell within reach  - Keep bed low and locked with side rails adjusted as appropriate  - Keep care items and personal belongings within reach  - Initiate and maintain comfort rounds  - Make Fall Risk Sign visible to staff  - Offer Toileting every 2 Hours, in advance of need  - Initiate/Maintain bed alarm  - Obtain necessary fall risk management equipment:   - Apply yellow socks and bracelet for high fall risk patients  - Consider moving patient to room near nurses station  Outcome: Progressing  Goal: Maintain or return to baseline ADL function  Description: INTERVENTIONS:  -  Assess patient's ability to carry out ADLs; assess patient's baseline for ADL function and identify physical deficits which impact ability to perform ADLs (bathing, care of mouth/teeth, toileting, grooming, dressing, etc.)  - Assess/evaluate cause of self-care deficits   - Assess range of motion  - Assess patient's mobility; develop plan if impaired  - Assess patient's need for assistive devices and provide as appropriate  - Encourage maximum independence but intervene and supervise when necessary  - Involve family in performance of ADLs  - Assess for home care needs following discharge   - Consider OT consult to assist with ADL evaluation and planning for discharge  - Provide patient education as appropriate  Outcome: Progressing  Goal: Maintains/Returns to pre admission functional level  Description: INTERVENTIONS:  - Perform AM-PAC 6 Click Basic Mobility/ Daily Activity assessment daily.  - Set and communicate daily mobility goal to care team and patient/family/caregiver.   - Collaborate with  rehabilitation services on mobility goals if consulted  - Perform Range of Motion 4 times a day.  - Reposition patient every 2 hours.  - Dangle patient 4 times a day  - Stand patient 4 times a day  - Ambulate patient 4 times a day  - Out of bed to chair 4 times a day   - Out of bed for meals 4 times a day  - Out of bed for toileting  - Record patient progress and toleration of activity level   Outcome: Progressing

## 2024-07-31 NOTE — PROGRESS NOTES
Cardiology Progress Note   MD Osvaldo Reyes MD, FACC  Kostas Bruce DO, [facility]  MD Coby Dumont DO, [facility]  Kelton Rodriguez DO, [facility]  ----------------------------------------------------------------  [address] [address]    Bhavna Davidsoncon 74 y.o. female MRN: [id number]  Unit/Bed#: E4 -01 Encounter: [id number]      ASSESSMENT:   Septic shock secondary to MRSA bacteremia from suspected T9-T10 osteomyelitis  Persistent atrial fibrillation with intermittent RVR  Acute kidney injury on CKD  Nonischemic troponin elevation secondary to sepsis, acute kidney injury  Acquired adrenal insufficiency  Acute on chronic HFpEF  LVEF > 75%, severe MAC, trace MR, mild TR with PASP 65 mmHg, he has July 2024  Severe rheumatoid arthritis  Severe ambulatory dysfunction  Psoriasis    PLAN:  Weights continuing to come down on oral diuretic  Would minimize further IV fluids  Low threshold to uptitrate diuretic as needed  Continue metoprolol 25 mg twice daily; heart rates controlled  Eliquis for thromboembolic prophylaxis  Keep potassium greater than 4 and magnesium greater than 2  Strict I's/O's and daily weights  Steroids as per primary team  Outpatient follow-up with primary cardiologist within 72 hours of discharge for additional CV testing as clinically indicated  Will see further inpatient as needed; please reconsult with questions    Signed: Kostas Bruce DO, [facility], LINDSEY MATHEWS, FACP      History of Present Illness:  Patient seen and examined.  No acute events overnight.  Denies chest pain, pressure, tightness or squeezing.  Denies lightheadedness, dizziness or palpitations.  Denies lower extremity swelling, orthopnea or paroxysmal nocturnal dyspnea.  Resting comfortably in bed.    Review of Systems:  Review of Systems   Constitutional: Negative for decreased appetite, fever, weight gain and weight loss.   HENT:  Negative for congestion and sore  throat.    Eyes:  Negative for visual disturbance.   Cardiovascular:  Negative for chest pain, dyspnea on exertion, leg swelling, near-syncope and palpitations.   Respiratory:  Negative for cough and shortness of breath.    Hematologic/Lymphatic: Negative for bleeding problem.   Skin:  Negative for rash.   Musculoskeletal:  Negative for myalgias and neck pain.   Gastrointestinal:  Negative for abdominal pain and nausea.   Neurological:  Negative for light-headedness and weakness.   Psychiatric/Behavioral:  Negative for depression.         Allergies   Allergen Reactions    Shellfish-Derived Products - Food Allergy Itching    Methotrexate Derivatives     Amoxicillin Rash    Ampicillin-Sulbactam Sodium Rash       No current facility-administered medications on file prior to encounter.     Current Outpatient Medications on File Prior to Encounter   Medication Sig    ammonium lactate (LAC-HYDRIN) 12 % cream Apply topically 2 (two) times a day    acetaminophen (TYLENOL) 325 mg tablet Take 2 tablets (650 mg total) by mouth every 4 (four) hours as needed for mild pain, headaches or fever. (Patient taking differently: Take 650 mg by mouth every 6 (six) hours as needed for mild pain, headaches or fever)    albuterol (Ventolin HFA) 90 mcg/act inhaler Inhale 2 puffs every 6 (six) hours as needed for wheezing    alendronate (FOSAMAX) 70 mg tablet Take 70 mg by mouth every 7 days Do not start before July 28, 2024.    apixaban (ELIQUIS) 5 mg Take 1 tablet (5 mg total) by mouth 2 (two) times a day    bisacodyl (FLEET) 10 MG/30ML ENEM Insert 10 mg into the rectum once (Patient not taking: Reported on 7/17/2024)    cholecalciferol (VITAMIN D3) 1,000 units tablet Take 1 tablet (1,000 Units total) by mouth daily    clobetasol (TEMOVATE) 0.05 % cream Apply topically 2 (two) times a day    furosemide (LASIX) 20 mg tablet Take 1 tablet (20 mg total) by mouth daily    hydroxychloroquine (PLAQUENIL) 200 mg tablet Take 1 tablet (200 mg  total) by mouth 2 (two) times a day    lidocaine (LIDODERM) 5 % Apply 2 patches topically daily Remove & Discard patch within 12 hours or as directed by MD Do not start before December 20, 2022. (Patient taking differently: Apply 2 patches topically daily Remove & Discard patch within 12 hours or as directed by MD)    metoprolol tartrate (LOPRESSOR) 25 mg tablet Take 1 tablet (25 mg total) by mouth every 12 (twelve) hours    miconazole 2 % cream Apply topically 2 (two) times a day    nystatin (MYCOSTATIN) powder Apply topically 2 (two) times a day    pantoprazole (PROTONIX) 40 mg tablet Take 1 tablet (40 mg total) by mouth daily in the early morning    predniSONE 5 mg tablet Take 1 tablet (5 mg total) by mouth 2 (two) times a day with meals Do not start before June 5, 2023.    triamcinolone (KENALOG) 0.1 % cream Apply topically 2 (two) times a day Apply to BUE and BLE topically everyday and evening shift for psoriasis    Zinc 50 MG TABS Take 1 tablet by mouth in the morning        Current Facility-Administered Medications   Medication Dose Route Frequency Provider Last Rate    acetaminophen  650 mg Oral Q4H PRN Navjot Cline MD      ammonium lactate   Topical BID Navjot Cline MD      apixaban  5 mg Oral BID Navjot Cline MD      chlorhexidine  15 mL Mouth/Throat Q12H FAMILIA Navjot Cline MD      doxycycline hyclate  100 mg Oral BID Navjot Cline MD      furosemide  20 mg Oral Daily Navjot Cline MD      labetalol  10 mg Intravenous Q6H PRN Navjot Cline MD      levalbuterol  1.25 mg Nebulization Q8H PRN Navjot Cline MD      metoprolol tartrate  25 mg Oral Q12H FAMILIA Navjot Cline MD      ondansetron  4 mg Intravenous Q4H PRN Navjot Cline MD      pantoprazole  40 mg Oral Early Morning Navjot Cline MD      phenol  1 spray Mouth/Throat Q2H PRN  "Navjot Cline MD      predniSONE  5 mg Oral BID With Meals Navjot Cline MD      trimethobenzamide  200 mg Intramuscular Q6H PRN Navjot Cline MD                Vitals:    07/30/24 2147 07/30/24 2341 07/31/24 0600 07/31/24 0718   BP: 110/56 134/58  120/55   BP Location:  Right arm  Right arm   Pulse: 87 78  80   Resp:  18  20   Temp:  97.8 °F (36.6 °C)  98 °F (36.7 °C)   TempSrc:  Temporal  Temporal   SpO2:  100%  100%   Weight:   95.2 kg (209 lb 14.1 oz)    Height:         Body mass index is 34.93 kg/m².      Intake/Output Summary (Last 24 hours) at 7/31/2024 0941  Last data filed at 7/31/2024 0000  Gross per 24 hour   Intake --   Output 650 ml   Net -650 ml       Weight change:     PHYSICAL EXAMINATION:  Gen: Awake, confused, NAD  Head/eyes: AT/NC, pupils equal and round, Anicteric  ENT: mmm  Neck: Supple, No elevated JVP, trachea midline  Resp: CTA bilaterally no w/r/r  CV: irreg irreg +S1, S2, No m/r/g  Abd: Soft, NT/ND + BS  Ext: Trivial pitting LE edema bilaterally  Neuro: Does not follow commands, but moves all extermities  Psych: Blunted affect, normal mood, cooperative attitude, non-combative  Skin: warm; no rash, erythema or venous stasis changes on exposed skin    Lab Results:  Results from last 7 days   Lab Units 07/29/24  0436   WBC Thousand/uL 6.18   HEMOGLOBIN g/dL 8.2*   HEMATOCRIT % 25.5*   PLATELETS Thousands/uL 157     Results from last 7 days   Lab Units 07/29/24  0436 07/28/24  0530   POTASSIUM mmol/L 4.1 5.1   CHLORIDE mmol/L 106 109*   CO2 mmol/L 25 26   BUN mg/dL 31* 31*   CREATININE mg/dL 1.99* 2.00*   CALCIUM mg/dL 8.3* 9.2   ALK PHOS U/L  --  74   ALT U/L  --  42   AST U/L  --  37     No results found for: \"TROPONINT\"            Results from last 7 days   Lab Units 07/27/24  1523   INR  1.96*       Tele: AF 60s to 100s     This note was completed in part utilizing M-Modal Fluency Direct Software.  Grammatical errors, random word insertions, " spelling mistakes, and incomplete sentences may be an occasional consequence of this system secondary to software limitations, ambient noise, and hardware issues.  If you have any questions or concerns about the content, text, or information contained within the body of this dictation, please contact the provider for clarification.

## 2024-07-31 NOTE — ASSESSMENT & PLAN NOTE
Patient is bed bound, wheelchair bound and she is vamshi lift at [facility] at baseline.  No further PT needs.

## 2024-07-31 NOTE — CASE MANAGEMENT
Case Management Discharge Planning Note    Patient name Bhavna Al  Location [facility] /E4 -* MRN [id number]  : 1950 Date 2024       Current Admission Date: 2024  Current Admission Diagnosis:encephalopathy   Patient Active Problem List    Diagnosis Date Noted Date Diagnosed    Goals of care, counseling/discussion 2024     encephalopathy 2024     Elevated troponin 2024     Sepsis (HCC) 2024     Acute kidney injury superimposed on chronic kidney disease  ([facility]) 2024     Hypokalemia 2024     Chronic pruritic rash in adult 2024     Secondary adrenal insufficiency ([facility]) 2024     Pressure ulcer of left buttock, stage 3 ([facility]) 2024     Acute osteomyelitis of thoracic spine ([facility]) 2024     MRSA bacteremia 2024     Hypotension 2024     Dysphagia 2024     Deep tissue injury 2024     Hypernatremia 2024     Localized swelling on left hand 2023     Stage 3 chronic kidney disease (HCC) 2023     Febrile illness 2023     Dermatitis associated with incontinence 2023     Right shoulder pain 12/15/2022     Abnormal urinalysis 2022     Ambulatory dysfunction 2022     Hyperuricemia 2022     Acute metabolic encephalopathy 2022     Acute on chronic diastolic heart failure, LVEF 65%, LVIDd 3.3 cm, AHA Stage C 2022     Acute bronchitis 2022     Assistance needed with transportation 09/15/2022     Abnormal CT of the chest 2022     Gram-positive cocci bacteremia 2022     Psoriasis 2022     COVID-19 2022     Shock ([facility]) 01/10/2022     Persistent atrial fibrillation 01/10/2022     Hyperparathyroidism ([facility]) 2021     Murmur, cardiac 2021     BPPV (benign paroxysmal positional vertigo) 2018     Seasonal allergic rhinitis due to pollen 2018     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     Osteoporosis 2016      Leukopenia 08/23/2016     Rheumatoid arthritis (HCC) 08/23/2016     GERD (gastroesophageal reflux disease) 08/23/2016     Sarcoid 08/23/2016     Morbid obesity (HCC) 07/12/2016     Vitamin D deficiency 07/16/2015     Essential hypertension 12/04/2014     Lumbar radiculopathy 12/04/2014       LOS (days): 10  Geometric Mean LOS (GMLOS) (days): 5.1  Days to GMLOS:-5     OBJECTIVE:  Risk of Unplanned Readmission Score: 35.7         Current admission status: Inpatient   Preferred Pharmacy:   [id number] - [place], [place] - [address]  [address]  [place] [address]  Phone: [phone] Fax: [phone]    [facility] - [address] [address]  Phone: [phone] Fax: [phone]    Primary Care Provider: Nya Taveras DO    Primary Insurance: MEDICARE  Secondary Insurance: [facility]    DISCHARGE DETAILS:                                                                                                 Additional Comments: CM met with the pt.  No return call from the dtr.   Pt awake and alert and appears for more oriented this afternoon.  She was made aware that after she is treated here at [facility] we will plan to return to [facility].  She verbalized understanding and had no questions at this time.

## 2024-07-31 NOTE — ASSESSMENT & PLAN NOTE
CKD stage 3a  Creatinine baseline is 1-1.2 prior to this admission  Monitor kidney indices  Avoid nephrotoxic medications  Creatinine currently in the range of 1.9-2  May be new baseline

## 2024-07-31 NOTE — PROGRESS NOTES
[facility]  Progress Note  Name: Bhavna Al I  MRN: [id number]  Unit/Bed#: E4 -01 I Date of Admission: 7/21/2024   Date of Service: 7/31/2024 I Hospital Day: 10    Assessment & Plan   Elevated troponin  Assessment & Plan  Troponin trend 1374->1239->296  Likely non-MI troponin elevation in setting of sepsis and acute kidney injury    Chronic pruritic rash in adult  Assessment & Plan  She has psoriasis proven by skin biopsy  use Lac-Hydrin lotion as needed    Acute kidney injury superimposed on chronic kidney disease  (HCC)  Assessment & Plan  CKD stage 3a  Creatinine baseline is 1-1.2 prior to this admission  Monitor kidney indices  Avoid nephrotoxic medications  Creatinine currently in the range of 1.9-2  May be new baseline    Persistent atrial fibrillation  Assessment & Plan  Continue home regimen of lopressor 25 mg BID, Hrs controlled on this regimen  Dig loaded on 7/26-27  Continue Eliquis for AC  Cardiology team on board-appreciate recommendations  Continuous cardiopulmonary monitoring    MRSA bacteremia  Assessment & Plan  ID on board, on oral doxycycline for planned prolonged course ideally until inflammatory markers normalized/plateau.     Ambulatory dysfunction  Assessment & Plan  Patient is bed bound, wheelchair bound and she is vamshi lift at [facility] at baseline.  No further PT needs.    Acute on chronic diastolic heart failure, LVEF 65%, LVIDd 3.3 cm, AHA Stage C  Assessment & Plan  Wt Readings from Last 3 Encounters:   07/31/24 95.2 kg (209 lb 14.1 oz)   07/11/24 108 kg (238 lb)   07/05/24 108 kg (238 lb 1.6 oz)     7/22 2D echo revealed ejection fraction greater than 75%, wall motion normal  Cardiology following  Was on diuretic gtt however now on po lasix with improvement in weights  Continue daily weights, I&Os    Morbid obesity (HCC)  Assessment & Plan  Recommend decrease caloric intake and increase activity     Rheumatoid arthritis (HCC)  Assessment &  Plan  Continue Plaquenil 200 mg  Prior to admission on prednisone 5 mg twice a day  Does have psoriasis    * encephalopathy  Assessment & Plan  Acute encephalopathy possibly secondary to worsening hypoactive delirium  Transferred to the ICU for closer monitoring  Patient currently more , awake an alert, oriented, but fatigued and reluctant to talk  Ammonia levels within normal limits, VBG did not show hypercarbia  -passed speech and swallow evaluation with dysphagia diet.  All medications were changed to PO              VTE Pharmacologic Prophylaxis: VTE Score: 5 High Risk (Score >/= 5) - Pharmacological DVT Prophylaxis Ordered: apixaban (Eliquis). Sequential Compression Devices Ordered.    Mobility:   Basic Mobility Inpatient Raw Score: 6  JH-HLM Goal: 2: Bed activities/Dependent transfer  JH-HLM Achieved: 1: Laying in bed  JH-HLM Goal NOT achieved. Continue with multidisciplinary rounding and encourage appropriate mobility to improve upon JH-HLM goals.    Patient Centered Rounds: I performed bedside rounds with nursing staff today.   Discussions with Specialists or Other Care Team Provider: Cardiology    Education and Discussions with Family / Patient: Updated  (daughter) via phone.    Current Length of Stay: 10 day(s)  Current Patient Status: Inpatient   Certification Statement: The patient will continue to require additional inpatient hospital stay due to ongoing medical optimization  Discharge Plan: Anticipate discharge in 24-48 hrs to [place]    Code Status: Level 1 - Full Code    Subjective:   Patient seen and examined at bedside. Complaining of left upper extremity pain.     Objective:     Vitals:   Temp (24hrs), Av.8 °F (36.6 °C), Min:97.6 °F (36.4 °C), Max:98 °F (36.7 °C)    Temp:  [97.6 °F (36.4 °C)-98 °F (36.7 °C)] 97.6 °F (36.4 °C)  HR:  [78-87] 87  Resp:  [18-20] 18  BP: (102-134)/(55-58) 102/58  SpO2:  [94 %-100 %] 94 %  Body mass index is 34.93 kg/m².     Input and Output  Summary (last 24 hours):     Intake/Output Summary (Last 24 hours) at 7/31/2024 1715  Last data filed at 7/31/2024 1101  Gross per 24 hour   Intake 120 ml   Output 100 ml   Net 20 ml       Physical Exam:   Physical Exam  Vitals and nursing note reviewed.   Constitutional:       General: She is not in acute distress.     Appearance: She is well-developed. She is obese.   HENT:      Head: Normocephalic and atraumatic.   Eyes:      Conjunctiva/sclera: Conjunctivae normal.   Cardiovascular:      Rate and Rhythm: Normal rate and regular rhythm.      Heart sounds: No murmur heard.  Pulmonary:      Effort: Pulmonary effort is normal. No respiratory distress.      Breath sounds: Normal breath sounds.   Abdominal:      Palpations: Abdomen is soft.      Tenderness: There is no abdominal tenderness.   Musculoskeletal:         General: Swelling and tenderness present.      Cervical back: Neck supple.      Comments: +left upper extremity edema with tenderness on palpation, some bruising noted, no warmth, no erythema   Skin:     General: Skin is warm and dry.      Capillary Refill: Capillary refill takes less than 2 seconds.   Neurological:      Mental Status: She is alert and oriented to person, place, and time. Mental status is at baseline.   Psychiatric:         Mood and Affect: Mood normal.        Additional Data:     Labs:  Results from last 7 days   Lab Units 07/31/24  0936   WBC Thousand/uL 4.68   HEMOGLOBIN g/dL 9.0*   HEMATOCRIT % 28.6*   PLATELETS Thousands/uL 137*   SEGS PCT % 57   LYMPHO PCT % 31   MONO PCT % 7   EOS PCT % 3     Results from last 7 days   Lab Units 07/31/24  0936   SODIUM mmol/L 141   POTASSIUM mmol/L 4.5   CHLORIDE mmol/L 108   CO2 mmol/L 24   BUN mg/dL 31*   CREATININE mg/dL 1.74*   ANION GAP mmol/L 9   CALCIUM mg/dL 8.9   ALBUMIN g/dL 2.6*   TOTAL BILIRUBIN mg/dL 0.81   ALK PHOS U/L 86   ALT U/L 58*   AST U/L 74*   GLUCOSE RANDOM mg/dL 62*     Results from last 7 days   Lab Units 07/27/24  1523    INR  1.96*     Results from last 7 days   Lab Units 07/31/24  1628 07/31/24  1112 07/30/24  2109 07/30/24  1815 07/30/24  0756 07/29/24  2100 07/29/24  1522 07/29/24  1127 07/29/24  0827 07/29/24  0101 07/28/24 2010 07/28/24  1737   POC GLUCOSE mg/dl 85 56* 87 78 85 104 102 89 68 111 110 96         Results from last 7 days   Lab Units 07/31/24  0936   PROCALCITONIN ng/ml 1.28*       Lines/Drains:  Invasive Devices       Peripheral Intravenous Line  Duration             Peripheral IV 07/27/24 Dorsal (posterior);Right Wrist 4 days    Peripheral IV 07/27/24 Right;Ventral (anterior) Forearm 4 days              Drain  Duration             External Urinary Catheter <1 day                          Imaging: Reviewed radiology reports from this admission including: chest xray, chest CT scan, abdominal/pelvic CT, CT head, xray(s), and ECHO    Recent Cultures (last 7 days):         Last 24 Hours Medication List:   Current Facility-Administered Medications   Medication Dose Route Frequency Provider Last Rate    acetaminophen  650 mg Oral Q4H PRN Navjot Cline MD      ammonium lactate   Topical BID Navjot Cline MD      apixaban  5 mg Oral BID Navjot Cline MD      chlorhexidine  15 mL Mouth/Throat Q12H FAMILIA Navjot Cline MD      doxycycline hyclate  100 mg Oral BID Navjot Cline MD      furosemide  20 mg Oral Daily Navojt Cline MD      labetalol  10 mg Intravenous Q6H PRN Navjot Cline MD      levalbuterol  1.25 mg Nebulization Q8H PRN Navjot Cline MD      metoprolol tartrate  25 mg Oral Q12H FAMILIA Navjot Cline MD      ondansetron  4 mg Intravenous Q4H PRN Navjot Cline MD      pantoprazole  40 mg Oral Early Morning Navjot Cline MD      phenol  1 spray Mouth/Throat Q2H PRN Navjot Cline MD      predniSONE  5 mg Oral BID With Meals  Navjot Cline MD      trimethobenzamide  200 mg Intramuscular Q6H PRN Navjot Cline MD          Today, Patient Was Seen By: Vane Ramirez MD    **Please Note: This note may have been constructed using a voice recognition system.**

## 2024-08-01 ENCOUNTER — APPOINTMENT (INPATIENT)
Dept: NON INVASIVE DIAGNOSTICS | Facility: HOSPITAL | Age: 74
DRG: 871 | End: 2024-08-01
Payer: MEDICARE

## 2024-08-01 PROBLEM — M79.602 PAIN AND SWELLING OF LEFT UPPER EXTREMITY: Status: ACTIVE | Noted: 2024-08-01

## 2024-08-01 PROBLEM — M79.89 PAIN AND SWELLING OF LEFT UPPER EXTREMITY: Status: ACTIVE | Noted: 2024-08-01

## 2024-08-01 LAB
ALBUMIN SERPL BCG-MCNC: 2.5 G/DL (ref 3.5–5)
ALP SERPL-CCNC: 95 U/L (ref 34–104)
ALT SERPL W P-5'-P-CCNC: 60 U/L (ref 7–52)
ANION GAP SERPL CALCULATED.3IONS-SCNC: 7 MMOL/L (ref 4–13)
AST SERPL W P-5'-P-CCNC: 64 U/L (ref 13–39)
BASOPHILS # BLD AUTO: 0.01 THOUSANDS/ÂΜL (ref 0–0.1)
BASOPHILS NFR BLD AUTO: 0 % (ref 0–1)
BILIRUB SERPL-MCNC: 0.8 MG/DL (ref 0.2–1)
BUN SERPL-MCNC: 28 MG/DL (ref 5–25)
CALCIUM ALBUM COR SERPL-MCNC: 10.3 MG/DL (ref 8.3–10.1)
CALCIUM SERPL-MCNC: 9.1 MG/DL (ref 8.4–10.2)
CHLORIDE SERPL-SCNC: 111 MMOL/L (ref 96–108)
CO2 SERPL-SCNC: 26 MMOL/L (ref 21–32)
CREAT SERPL-MCNC: 1.46 MG/DL (ref 0.6–1.3)
EOSINOPHIL # BLD AUTO: 0.06 THOUSAND/ÂΜL (ref 0–0.61)
EOSINOPHIL NFR BLD AUTO: 1 % (ref 0–6)
ERYTHROCYTE [DISTWIDTH] IN BLOOD BY AUTOMATED COUNT: 17.6 % (ref 11.6–15.1)
GFR SERPL CREATININE-BSD FRML MDRD: 34 ML/MIN/1.73SQ M
GLUCOSE SERPL-MCNC: 74 MG/DL (ref 65–140)
GLUCOSE SERPL-MCNC: 83 MG/DL (ref 65–140)
GLUCOSE SERPL-MCNC: 84 MG/DL (ref 65–140)
GLUCOSE SERPL-MCNC: 89 MG/DL (ref 65–140)
HCT VFR BLD AUTO: 26.4 % (ref 34.8–46.1)
HGB BLD-MCNC: 8.5 G/DL (ref 11.5–15.4)
IMM GRANULOCYTES # BLD AUTO: 0.05 THOUSAND/UL (ref 0–0.2)
IMM GRANULOCYTES NFR BLD AUTO: 1 % (ref 0–2)
LYMPHOCYTES # BLD AUTO: 0.66 THOUSANDS/ÂΜL (ref 0.6–4.47)
LYMPHOCYTES NFR BLD AUTO: 12 % (ref 14–44)
MAGNESIUM SERPL-MCNC: 1.5 MG/DL (ref 1.9–2.7)
MCH RBC QN AUTO: 28.4 PG (ref 26.8–34.3)
MCHC RBC AUTO-ENTMCNC: 32.2 G/DL (ref 31.4–37.4)
MCV RBC AUTO: 88 FL (ref 82–98)
MONOCYTES # BLD AUTO: 0.35 THOUSAND/ÂΜL (ref 0.17–1.22)
MONOCYTES NFR BLD AUTO: 6 % (ref 4–12)
NEUTROPHILS # BLD AUTO: 4.53 THOUSANDS/ÂΜL (ref 1.85–7.62)
NEUTS SEG NFR BLD AUTO: 80 % (ref 43–75)
NRBC BLD AUTO-RTO: 0 /100 WBCS
PLATELET # BLD AUTO: 124 THOUSANDS/UL (ref 149–390)
POTASSIUM SERPL-SCNC: 3.9 MMOL/L (ref 3.5–5.3)
PROT SERPL-MCNC: 5.2 G/DL (ref 6.4–8.4)
RBC # BLD AUTO: 2.99 MILLION/UL (ref 3.81–5.12)
SODIUM SERPL-SCNC: 144 MMOL/L (ref 135–147)
WBC # BLD AUTO: 5.66 THOUSAND/UL (ref 4.31–10.16)

## 2024-08-01 PROCEDURE — 92526 ORAL FUNCTION THERAPY: CPT

## 2024-08-01 PROCEDURE — 83735 ASSAY OF MAGNESIUM: CPT | Performed by: STUDENT IN AN ORGANIZED HEALTH CARE EDUCATION/TRAINING PROGRAM

## 2024-08-01 PROCEDURE — 99233 SBSQ HOSP IP/OBS HIGH 50: CPT | Performed by: STUDENT IN AN ORGANIZED HEALTH CARE EDUCATION/TRAINING PROGRAM

## 2024-08-01 PROCEDURE — 93971 EXTREMITY STUDY: CPT | Performed by: SURGERY

## 2024-08-01 PROCEDURE — 82948 REAGENT STRIP/BLOOD GLUCOSE: CPT

## 2024-08-01 PROCEDURE — 80053 COMPREHEN METABOLIC PANEL: CPT | Performed by: STUDENT IN AN ORGANIZED HEALTH CARE EDUCATION/TRAINING PROGRAM

## 2024-08-01 PROCEDURE — 85025 COMPLETE CBC W/AUTO DIFF WBC: CPT | Performed by: STUDENT IN AN ORGANIZED HEALTH CARE EDUCATION/TRAINING PROGRAM

## 2024-08-01 PROCEDURE — 93971 EXTREMITY STUDY: CPT

## 2024-08-01 RX ORDER — LANOLIN ALCOHOL/MO/W.PET/CERES
400 CREAM (GRAM) TOPICAL 2 TIMES DAILY
Status: DISCONTINUED | OUTPATIENT
Start: 2024-08-01 | End: 2024-08-02 | Stop reason: HOSPADM

## 2024-08-01 RX ORDER — MAGNESIUM SULFATE HEPTAHYDRATE 40 MG/ML
2 INJECTION, SOLUTION INTRAVENOUS ONCE
Status: COMPLETED | OUTPATIENT
Start: 2024-08-01 | End: 2024-08-01

## 2024-08-01 RX ADMIN — METOPROLOL TARTRATE 25 MG: 25 TABLET, FILM COATED ORAL at 21:59

## 2024-08-01 RX ADMIN — Medication 400 MG: at 13:23

## 2024-08-01 RX ADMIN — METOPROLOL TARTRATE 25 MG: 25 TABLET, FILM COATED ORAL at 08:45

## 2024-08-01 RX ADMIN — APIXABAN 5 MG: 5 TABLET, FILM COATED ORAL at 08:45

## 2024-08-01 RX ADMIN — DOXYCYCLINE 100 MG: 100 CAPSULE ORAL at 21:59

## 2024-08-01 RX ADMIN — Medication: at 09:16

## 2024-08-01 RX ADMIN — APIXABAN 5 MG: 5 TABLET, FILM COATED ORAL at 17:21

## 2024-08-01 RX ADMIN — PREDNISONE 5 MG: 5 TABLET ORAL at 08:45

## 2024-08-01 RX ADMIN — MAGNESIUM SULFATE HEPTAHYDRATE 2 G: 40 INJECTION, SOLUTION INTRAVENOUS at 08:45

## 2024-08-01 RX ADMIN — CHLORHEXIDINE GLUCONATE 15 ML: 1.2 RINSE ORAL at 08:45

## 2024-08-01 RX ADMIN — PREDNISONE 5 MG: 5 TABLET ORAL at 17:21

## 2024-08-01 RX ADMIN — DOXYCYCLINE 100 MG: 100 CAPSULE ORAL at 08:45

## 2024-08-01 RX ADMIN — PANTOPRAZOLE SODIUM 40 MG: 40 TABLET, DELAYED RELEASE ORAL at 05:19

## 2024-08-01 RX ADMIN — Medication 400 MG: at 17:21

## 2024-08-01 RX ADMIN — CHLORHEXIDINE GLUCONATE 15 ML: 1.2 RINSE ORAL at 21:58

## 2024-08-01 RX ADMIN — Medication: at 17:24

## 2024-08-01 RX ADMIN — FUROSEMIDE 20 MG: 20 TABLET ORAL at 08:45

## 2024-08-01 NOTE — ASSESSMENT & PLAN NOTE
Suspect superficial thrombophlebitis vs. Dependent edema vs. Swelling from IV and lines placed throughout patient's stay  No erythema, warmth to suggest clot or infection  Venous duplex pending, patient had refused multiple times due to pain  Rest, ice, elevation ordered  Supportive care, frequent repositioning, pain management

## 2024-08-01 NOTE — CASE MANAGEMENT
Case Management Discharge Planning Note    Patient name Bhavna Al  Location [facility] /[facility] -* MRN [id number]  : 1950 Date 2024       Current Admission Date: 2024  Current Admission Diagnosis:encephalopathy   Patient Active Problem List    Diagnosis Date Noted Date Diagnosed    Goals of care, counseling/discussion 2024     encephalopathy 2024     Elevated troponin 2024     Sepsis (HCC) 2024     Acute kidney injury superimposed on chronic kidney disease  ([facility]) 2024     Hypokalemia 2024     Chronic pruritic rash in adult 2024     Secondary adrenal insufficiency ([facility]) 2024     Pressure ulcer of left buttock, stage 3 ([facility]) 2024     Acute osteomyelitis of thoracic spine ([facility]) 2024     MRSA bacteremia 2024     Hypotension 2024     Dysphagia 2024     Deep tissue injury 2024     Hypernatremia 2024     Localized swelling on left hand 2023     Stage 3 chronic kidney disease (HCC) 2023     Febrile illness 2023     Dermatitis associated with incontinence 2023     Right shoulder pain 12/15/2022     Abnormal urinalysis 2022     Ambulatory dysfunction 2022     Hyperuricemia 2022     Acute metabolic encephalopathy 2022     Acute on chronic diastolic heart failure, LVEF 65%, LVIDd 3.3 cm, AHA Stage C 2022     Acute bronchitis 2022     Assistance needed with transportation 09/15/2022     Abnormal CT of the chest 2022     Gram-positive cocci bacteremia 2022     Psoriasis 2022     COVID-19 2022     Shock ([facility]) 01/10/2022     Persistent atrial fibrillation 01/10/2022     Hyperparathyroidism ([facility]) 2021     Murmur, cardiac 2021     BPPV (benign paroxysmal positional vertigo) 2018     Seasonal allergic rhinitis due to pollen 2018     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     Osteoporosis 2016      Leukopenia 08/23/2016     Rheumatoid arthritis (HCC) 08/23/2016     GERD (gastroesophageal reflux disease) 08/23/2016     Sarcoid 08/23/2016     Morbid obesity (HCC) 07/12/2016     Vitamin D deficiency 07/16/2015     Essential hypertension 12/04/2014     Lumbar radiculopathy 12/04/2014       LOS (days): 11  Geometric Mean LOS (GMLOS) (days): 5.1  Days to GMLOS:-6.1     OBJECTIVE:  Risk of Unplanned Readmission Score: 36.75         Current admission status: Inpatient   Preferred Pharmacy:   [id number] - [place], [place] - [address]  [address]  [place] [address]  Phone: [phone] Fax: [phone]    [facility] - [place], [place] - [address] [place] [id number]  Phone: [phone] Fax: [phone]    Primary Care Provider: Nya Taveras DO    Primary Insurance: MEDICARE  Secondary Insurance: [facility]    DISCHARGE DETAILS:    IMM Given (Date):: 08/01/24  IMM Given to:: Family  Family notified:: Devorah Salamanca, granddaughter  Additional Comments: SHARRI s/w pt's granddaughter, Devorah, informed her that pt may be cleared for discharge back to [place] today.  Devorah stated she and her family were agreeable to the discharge back to [place] however, they did express that they are not happy with the care her grandmother has received at [place].  SHARRI directed Devorah to the  at [place].  Devorah stated an understanding.  Devorah also requested a list of SNFs in the area.  SHARRI provided this to Devorah.    Accepting Facility Name, City & State : [facility]  Receiving Facility/Agency Phone Number: [phone]  Facility/Agency Fax Number: [phone]

## 2024-08-01 NOTE — SPEECH THERAPY NOTE
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    Elevated troponin    Goals of care, counseling/discussion       Past Medical History  Past Medical History:   Diagnosis Date    Abnormal thyroid function test     last assessed: 2015     Arthritis     Caries     last assessed: 2016     Continuous opioid dependence (HCC) 2021    Edema of right lower extremity     last assessed: 2015     GERD (gastroesophageal reflux disease)     Hypertension     Medicare annual wellness visit, subsequent 2021    Positive blood culture 3/11/2022    Sarcoid         Past Surgical History  Past Surgical History:   Procedure Laterality Date     SECTION      IR NON-TUNNELED CENTRAL LINE PLACEMENT  2024    IR PICC PLACEMENT SINGLE LUMEN  2024    IR PICC PLACEMENT SINGLE LUMEN  2024    MULTIPLE TOOTH EXTRACTIONS N/A 2016    Procedure: Surgical extraction of teeth 2, 18, 19, 30, 31; incision and drainage of left subperiosteal abscess ;  Surgeon: Clara Cevallos DMD;  Location: [facility];  Service:          Subjective:  Pt alert this am. Good use of facial expressions.   Objective:  Seen at breakfast for f/u. Nurse was attempting to give meds crushed. Pt was refusing this and refusing the supplement. I offered her apple juice instead. Agreeable to a small sip, taken w/out difficulty.  She adamantly refused to take her meds. Nurse called her niece to speak w/ her. They were on the phone for quite some time. The pt  finally agreed to take them. They were given in 2 presentations instead of 1. Pt obviously disliked the taste of the first trial given facial expression/grimace. Gave her a sip of apple juice to wash it down/get the taste out of her mouth. Taken w/out difficulty. Took the second presentation. Made a face again. Started to gag and then vomited the meds and juice back out. I offered the pt just a small amt of jello to get the taste out of her mouth. She declined.   Assessment:  Pt has had very poor po intake since admit. Today was refusing meds.   Plan/Recommendations:  Puree w/ thin as tolerated. She does not appear to be drinking/like the supplements. Refused meds for quite some time this am, then vomited. Consider revisiting GOC discussion.

## 2024-08-01 NOTE — ASSESSMENT & PLAN NOTE
Patient is bed bound, wheelchair bound and she is vamshi lift at [facility] at baseline.  No further PT needs.

## 2024-08-01 NOTE — PLAN OF CARE
Problem: Prexisting or High Potential for Compromised Skin Integrity  Goal: Skin integrity is maintained or improved  Description: INTERVENTIONS:  - Identify patients at risk for skin breakdown  - Assess and monitor skin integrity  - Assess and monitor nutrition and hydration status  - Monitor labs   - Assess for incontinence   - Turn and reposition patient  - Assist with mobility/ambulation  - Relieve pressure over bony prominences  - Avoid friction and shearing  - Provide appropriate hygiene as needed including keeping skin clean and dry  - Evaluate need for skin moisturizer/barrier cream  - Collaborate with interdisciplinary team   - Patient/family teaching  - Consider wound care consult   Outcome: Progressing     Problem: PAIN - ADULT  Goal: Verbalizes/displays adequate comfort level or baseline comfort level  Description: Interventions:  - Encourage patient to monitor pain and request assistance  - Assess pain using appropriate pain scale  - Administer analgesics based on type and severity of pain and evaluate response  - Implement non-pharmacological measures as appropriate and evaluate response  - Consider cultural and social influences on pain and pain management  - Notify physician/advanced practitioner if interventions unsuccessful or patient reports new pain  Outcome: Progressing     Problem: INFECTION - ADULT  Goal: Absence or prevention of progression during hospitalization  Description: INTERVENTIONS:  - Assess and monitor for signs and symptoms of infection  - Monitor lab/diagnostic results  - Monitor all insertion sites, i.e. indwelling lines, tubes, and drains  - Monitor endotracheal if appropriate and nasal secretions for changes in amount and color  - Connell appropriate cooling/warming therapies per order  - Administer medications as ordered  - Instruct and encourage patient and family to use good hand hygiene technique  - Identify and instruct in appropriate isolation precautions for  identified infection/condition  Outcome: Progressing     Problem: SAFETY ADULT  Goal: Patient will remain free of falls  Description: INTERVENTIONS:  - Educate patient/family on patient safety including physical limitations  - Instruct patient to call for assistance with activity   - Consult OT/PT to assist with strengthening/mobility   - Keep Call bell within reach  - Keep bed low and locked with side rails adjusted as appropriate  - Keep care items and personal belongings within reach  - Initiate and maintain comfort rounds  - Make Fall Risk Sign visible to staff  - Offer Toileting every  Hours, in advance of need  - Initiate/Maintain alarm  - Obtain necessary fall risk management equipment:   - Apply yellow socks and bracelet for high fall risk patients  - Consider moving patient to room near nurses station  Outcome: Progressing  Goal: Maintain or return to baseline ADL function  Description: INTERVENTIONS:  -  Assess patient's ability to carry out ADLs; assess patient's baseline for ADL function and identify physical deficits which impact ability to perform ADLs (bathing, care of mouth/teeth, toileting, grooming, dressing, etc.)  - Assess/evaluate cause of self-care deficits   - Assess range of motion  - Assess patient's mobility; develop plan if impaired  - Assess patient's need for assistive devices and provide as appropriate  - Encourage maximum independence but intervene and supervise when necessary  - Involve family in performance of ADLs  - Assess for home care needs following discharge   - Consider OT consult to assist with ADL evaluation and planning for discharge  - Provide patient education as appropriate  Outcome: Progressing  Goal: Maintains/Returns to pre admission functional level  Description: INTERVENTIONS:  - Perform AM-PAC 6 Click Basic Mobility/ Daily Activity assessment daily.  - Set and communicate daily mobility goal to care team and patient/family/caregiver.   - Collaborate with  rehabilitation services on mobility goals if consulted  - Perform Range of Motion  times a day.  - Reposition patient every  hours.  - Dangle patient  times a day  - Stand patient  times a day  - Ambulate patient  times a day  - Out of bed to chair  times a day   - Out of bed for meals times a day  - Out of bed for toileting  - Record patient progress and toleration of activity level   Outcome: Progressing     Problem: DISCHARGE PLANNING  Goal: Discharge to home or other facility with appropriate resources  Description: INTERVENTIONS:  - Identify barriers to discharge w/patient and caregiver  - Arrange for needed discharge resources and transportation as appropriate  - Identify discharge learning needs (meds, wound care, etc.)  - Arrange for interpretive services to assist at discharge as needed  - Refer to Case Management Department for coordinating discharge planning if the patient needs post-hospital services based on physician/advanced practitioner order or complex needs related to functional status, cognitive ability, or social support system  Outcome: Progressing     Problem: Knowledge Deficit  Goal: Patient/family/caregiver demonstrates understanding of disease process, treatment plan, medications, and discharge instructions  Description: Complete learning assessment and assess knowledge base.  Interventions:  - Provide teaching at level of understanding  - Provide teaching via preferred learning methods  Outcome: Progressing     Problem: CARDIOVASCULAR - ADULT  Goal: Maintains optimal cardiac output and hemodynamic stability  Description: INTERVENTIONS:  - Monitor I/O, vital signs and rhythm  - Monitor for S/S and trends of decreased cardiac output  - Administer and titrate ordered vasoactive medications to optimize hemodynamic stability  - Assess quality of pulses, skin color and temperature  - Assess for signs of decreased coronary artery perfusion  - Instruct patient to report change in severity of  symptoms  Outcome: Progressing  Goal: Absence of cardiac dysrhythmias or at baseline rhythm  Description: INTERVENTIONS:  - Continuous cardiac monitoring, vital signs, obtain 12 lead EKG if ordered  - Administer antiarrhythmic and heart rate control medications as ordered  - Monitor electrolytes and administer replacement therapy as ordered  Outcome: Progressing     Problem: RESPIRATORY - ADULT  Goal: Achieves optimal ventilation and oxygenation  Description: INTERVENTIONS:  - Assess for changes in respiratory status  - Assess for changes in mentation and behavior  - Position to facilitate oxygenation and minimize respiratory effort  - Oxygen administered by appropriate delivery if ordered  - Initiate smoking cessation education as indicated  - Encourage broncho-pulmonary hygiene including cough, deep breathe, Incentive Spirometry  - Assess the need for suctioning and aspirate as needed  - Assess and instruct to report SOB or any respiratory difficulty  - Respiratory Therapy support as indicated  Outcome: Progressing     Problem: METABOLIC, FLUID AND ELECTROLYTES - ADULT  Goal: Electrolytes maintained within normal limits  Description: INTERVENTIONS:  - Monitor labs and assess patient for signs and symptoms of electrolyte imbalances  - Administer electrolyte replacement as ordered  - Monitor response to electrolyte replacements, including repeat lab results as appropriate  - Instruct patient on fluid and nutrition as appropriate  Outcome: Progressing  Goal: Fluid balance maintained  Description: INTERVENTIONS:  - Monitor labs   - Monitor I/O and WT  - Instruct patient on fluid and nutrition as appropriate  - Assess for signs & symptoms of volume excess or deficit  Outcome: Progressing  Goal: Glucose maintained within target range  Description: INTERVENTIONS:  - Monitor Blood Glucose as ordered  - Assess for signs and symptoms of hyperglycemia and hypoglycemia  - Administer ordered medications to maintain glucose  within target range  - Assess nutritional intake and initiate nutrition service referral as needed  Outcome: Progressing     Problem: HEMATOLOGIC - ADULT  Goal: Maintains hematologic stability  Description: INTERVENTIONS  - Assess for signs and symptoms of bleeding or hemorrhage  - Monitor labs  - Administer supportive blood products/factors as ordered and appropriate  Outcome: Progressing     Problem: Nutrition/Hydration-ADULT  Goal: Nutrient/Hydration intake appropriate for improving, restoring or maintaining nutritional needs  Description: Monitor and assess patient's nutrition/hydration status for malnutrition. Collaborate with interdisciplinary team and initiate plan and interventions as ordered.  Monitor patient's weight and dietary intake as ordered or per policy. Utilize nutrition screening tool and intervene as necessary. Determine patient's food preferences and provide high-protein, high-caloric foods as appropriate.     INTERVENTIONS:  - Monitor oral intake, urinary output, labs, and treatment plans  - Assess nutrition and hydration status and recommend course of action  - Evaluate amount of meals eaten  - Assist patient with eating if necessary   - Allow adequate time for meals  - Recommend/ encourage appropriate diets, oral nutritional supplements, and vitamin/mineral supplements  - Order, calculate, and assess calorie counts as needed  - Recommend, monitor, and adjust tube feedings and TPN/PPN based on assessed needs  - Assess need for intravenous fluids  - Provide specific nutrition/hydration education as appropriate  - Include patient/family/caregiver in decisions related to nutrition  Outcome: Progressing

## 2024-08-01 NOTE — PROGRESS NOTES
[facility]  Progress Note  Name: Bhavna Al I  MRN: [id number]  Unit/Bed#: E4 -01 I Date of Admission: 7/21/2024   Date of Service: 8/1/2024 I Hospital Day: 11    Assessment & Plan   Pain and swelling of left upper extremity  Assessment & Plan  Suspect superficial thrombophlebitis vs. Dependent edema vs. Swelling from IV and lines placed throughout patient's stay  No erythema, warmth to suggest clot or infection  Venous duplex pending, patient had refused multiple times due to pain  Rest, ice, elevation ordered  Supportive care, frequent repositioning, pain management    Elevated troponin  Assessment & Plan  Troponin trend 1374->1239->296  Likely non-MI troponin elevation in setting of sepsis and acute kidney injury    Chronic pruritic rash in adult  Assessment & Plan  She has psoriasis proven by skin biopsy  use Lac-Hydrin lotion as needed    Acute kidney injury superimposed on chronic kidney disease  (HCC)  Assessment & Plan  CKD stage 3a  Creatinine baseline is 1-1.2 prior to this admission  Monitor kidney indices  Avoid nephrotoxic medications  Creatinine down to 1.46 today  May be new baseline    Persistent atrial fibrillation  Assessment & Plan  Continue home regimen of lopressor 25 mg BID, Hrs controlled on this regimen  Dig loaded on 7/26-27  Continue Eliquis for AC  Cardiology team on board-appreciate recommendations  Continuous cardiopulmonary monitoring    MRSA bacteremia  Assessment & Plan  ID on board, on oral doxycycline for planned prolonged course ideally until inflammatory markers normalized/plateau  Outpatient follow-up with ID on discharge    Ambulatory dysfunction  Assessment & Plan  Patient is bed bound, wheelchair bound and she is vamshi lift at [facility] at baseline.  No further PT needs.    Acute on chronic diastolic heart failure, LVEF 65%, LVIDd 3.3 cm, AHA Stage C  Assessment & Plan  Wt Readings from Last 3 Encounters:   08/01/24 94.8 kg (209 lb)    07/11/24 108 kg (238 lb)   07/05/24 108 kg (238 lb 1.6 oz)     7/22 2D echo revealed ejection fraction greater than 75%, wall motion normal  Cardiology following  Was on diuretic gtt however now on po lasix with improvement in weights  Continue daily weights, I&Os    Morbid obesity (HCC)  Assessment & Plan  Recommend decrease caloric intake and increase activity     Rheumatoid arthritis (HCC)  Assessment & Plan  Continue Plaquenil 200 mg  Prior to admission on prednisone 5 mg twice a day  Does have psoriasis    * encephalopathy  Assessment & Plan  Acute encephalopathy possibly secondary to worsening hypoactive delirium  Transferred to the ICU for closer monitoring  Patient currently more , awake an alert, oriented, but fatigued and reluctant to talk  Ammonia levels within normal limits, VBG did not show hypercarbia  7/29-passed speech and swallow evaluation with dysphagia diet.  All medications were changed to PO          VTE Pharmacologic Prophylaxis: VTE Score: 5 High Risk (Score >/= 5) - Pharmacological DVT Prophylaxis Ordered: apixaban (Eliquis). Sequential Compression Devices Ordered.    Mobility:   Basic Mobility Inpatient Raw Score: 6  JH-HLM Goal: 2: Bed activities/Dependent transfer  JH-HLM Achieved: 2: Bed activities/Dependent transfer  JH-HLM Goal achieved. Continue to encourage appropriate mobility.    Patient Centered Rounds: I performed bedside rounds with nursing staff today.   Discussions with Specialists or Other Care Team Provider: ID, Case management    Education and Discussions with Family / Patient: Updated  (granddaughter) at bedside.    Current Length of Stay: 11 day(s)  Current Patient Status: Inpatient   Certification Statement: The patient will continue to require additional inpatient hospital stay due to left upper extremity swelling  Discharge Plan: Anticipate discharge in 24-48 hrs to [place]    Code Status: Level 1 - Full Code    Subjective:   Patient seen and examined  at bedside. No complaints apart from left upper extremity swelling. Granddaughter also at bedside. Patient states she will allow techs to perform ultrasound.     Objective:     Vitals:   Temp (24hrs), Av.6 °F (36.4 °C), Min:97.2 °F (36.2 °C), Max:98.5 °F (36.9 °C)    Temp:  [97.2 °F (36.2 °C)-98.5 °F (36.9 °C)] 98.5 °F (36.9 °C)  HR:  [] 93  Resp:  [18] 18  BP: (109-119)/(52-64) 119/54  SpO2:  [99 %-100 %] 99 %  Body mass index is 34.78 kg/m².     Input and Output Summary (last 24 hours):     Intake/Output Summary (Last 24 hours) at 2024 1714  Last data filed at 2024 0600  Gross per 24 hour   Intake 120 ml   Output 500 ml   Net -380 ml       Physical Exam:   Physical Exam  Vitals and nursing note reviewed.   Constitutional:       General: She is not in acute distress.     Appearance: She is well-developed.   HENT:      Head: Normocephalic and atraumatic.   Eyes:      Conjunctiva/sclera: Conjunctivae normal.   Cardiovascular:      Rate and Rhythm: Normal rate and regular rhythm.      Heart sounds: No murmur heard.  Pulmonary:      Effort: Pulmonary effort is normal. No respiratory distress.      Breath sounds: Normal breath sounds.   Abdominal:      Palpations: Abdomen is soft.      Tenderness: There is no abdominal tenderness.   Musculoskeletal:         General: Swelling and tenderness present.      Cervical back: Neck supple.      Comments: Tenderness and swelling over entirety of left upper extremity   No redness or warmth   Skin:     General: Skin is warm and dry.      Capillary Refill: Capillary refill takes less than 2 seconds.   Neurological:      Mental Status: She is alert.   Psychiatric:         Mood and Affect: Mood normal.            Additional Data:     Labs:  Results from last 7 days   Lab Units 24  0557   WBC Thousand/uL 5.66   HEMOGLOBIN g/dL 8.5*   HEMATOCRIT % 26.4*   PLATELETS Thousands/uL 124*   SEGS PCT % 80*   LYMPHO PCT % 12*   MONO PCT % 6   EOS PCT % 1     Results from  last 7 days   Lab Units 08/01/24  0557   SODIUM mmol/L 144   POTASSIUM mmol/L 3.9   CHLORIDE mmol/L 111*   CO2 mmol/L 26   BUN mg/dL 28*   CREATININE mg/dL 1.46*   ANION GAP mmol/L 7   CALCIUM mg/dL 9.1   ALBUMIN g/dL 2.5*   TOTAL BILIRUBIN mg/dL 0.80   ALK PHOS U/L 95   ALT U/L 60*   AST U/L 64*   GLUCOSE RANDOM mg/dL 89     Results from last 7 days   Lab Units 07/27/24  1523   INR  1.96*     Results from last 7 days   Lab Units 08/01/24  1121 08/01/24  0737 07/31/24  2058 07/31/24  1628 07/31/24  1112 07/30/24  2109 07/30/24  1815 07/30/24  0756 07/29/24  2100 07/29/24  1522 07/29/24  1127 07/29/24  0827   POC GLUCOSE mg/dl 84 74 90 85 56* 87 78 85 104 102 89 68         Results from last 7 days   Lab Units 07/31/24  0936   PROCALCITONIN ng/ml 1.28*       Lines/Drains:  Invasive Devices       Peripheral Intravenous Line  Duration             Peripheral IV 07/27/24 Dorsal (posterior);Right Wrist 5 days    Peripheral IV 07/27/24 Right;Ventral (anterior) Forearm 5 days              Drain  Duration             External Urinary Catheter 1 day                          Imaging: No pertinent imaging reviewed.    Recent Cultures (last 7 days):         Last 24 Hours Medication List:   Current Facility-Administered Medications   Medication Dose Route Frequency Provider Last Rate    acetaminophen  650 mg Oral Q4H PRN Navjot Cline MD      ammonium lactate   Topical BID Navjot Cline MD      apixaban  5 mg Oral BID Navjot Cline MD      chlorhexidine  15 mL Mouth/Throat Q12H FAMILIA Navjot Cline MD      doxycycline hyclate  100 mg Oral BID Navjot Cline MD      furosemide  20 mg Oral Daily Navjot Cline MD      labetalol  10 mg Intravenous Q6H PRN Navjot Cline MD      levalbuterol  1.25 mg Nebulization Q8H PRN Navjot Cline MD      magnesium Oxide  400 mg Oral BID Vane Davis MD       metoprolol tartrate  25 mg Oral Q12H FAMILIA Navjot Cline MD      ondansetron  4 mg Intravenous Q4H PRN Navjot Cline MD      pantoprazole  40 mg Oral Early Morning Navjot Cline MD      phenol  1 spray Mouth/Throat Q2H PRN Nvajot Cline MD      predniSONE  5 mg Oral BID With Meals Navjot Cline MD      trimethobenzamide  200 mg Intramuscular Q6H PRN Navjot Cline MD          Today, Patient Was Seen By: Vane Ramirez MD    **Please Note: This note may have been constructed using a voice recognition system.**

## 2024-08-01 NOTE — ASSESSMENT & PLAN NOTE
Wt Readings from Last 3 Encounters:   08/01/24 94.8 kg (209 lb)   07/11/24 108 kg (238 lb)   07/05/24 108 kg (238 lb 1.6 oz)     7/22 2D echo revealed ejection fraction greater than 75%, wall motion normal  Cardiology following  Was on diuretic gtt however now on po lasix with improvement in weights  Continue daily weights, I&Os

## 2024-08-01 NOTE — PLAN OF CARE
Problem: Prexisting or High Potential for Compromised Skin Integrity  Goal: Skin integrity is maintained or improved  Description: INTERVENTIONS:  - Identify patients at risk for skin breakdown  - Assess and monitor skin integrity  - Assess and monitor nutrition and hydration status  - Monitor labs   - Assess for incontinence   - Turn and reposition patient  - Assist with mobility/ambulation  - Relieve pressure over bony prominences  - Avoid friction and shearing  - Provide appropriate hygiene as needed including keeping skin clean and dry  - Evaluate need for skin moisturizer/barrier cream  - Collaborate with interdisciplinary team   - Patient/family teaching  - Consider wound care consult   8/1/2024 1756 by Darnell Livingston RN  Outcome: Progressing  8/1/2024 1756 by Darnell Livingston RN  Outcome: Progressing     Problem: PAIN - ADULT  Goal: Verbalizes/displays adequate comfort level or baseline comfort level  Description: Interventions:  - Encourage patient to monitor pain and request assistance  - Assess pain using appropriate pain scale  - Administer analgesics based on type and severity of pain and evaluate response  - Implement non-pharmacological measures as appropriate and evaluate response  - Consider cultural and social influences on pain and pain management  - Notify physician/advanced practitioner if interventions unsuccessful or patient reports new pain  8/1/2024 1756 by Darnell Livingston RN  Outcome: Progressing  8/1/2024 1756 by Darnell Livingston RN  Outcome: Progressing     Problem: INFECTION - ADULT  Goal: Absence or prevention of progression during hospitalization  Description: INTERVENTIONS:  - Assess and monitor for signs and symptoms of infection  - Monitor lab/diagnostic results  - Monitor all insertion sites, i.e. indwelling lines, tubes, and drains  - Monitor endotracheal if appropriate and nasal secretions for changes in amount and color  - [place] appropriate cooling/warming  therapies per order  - Administer medications as ordered  - Instruct and encourage patient and family to use good hand hygiene technique  - Identify and instruct in appropriate isolation precautions for identified infection/condition  8/1/2024 1756 by Darnell Livingston RN  Outcome: Progressing  8/1/2024 1756 by Darnell Livingsotn RN  Outcome: Progressing     Problem: SAFETY ADULT  Goal: Patient will remain free of falls  Description: INTERVENTIONS:  - Educate patient/family on patient safety including physical limitations  - Instruct patient to call for assistance with activity   - Consult OT/PT to assist with strengthening/mobility   - Keep Call bell within reach  - Keep bed low and locked with side rails adjusted as appropriate  - Keep care items and personal belongings within reach  - Initiate and maintain comfort rounds  - Make Fall Risk Sign visible to staff  - Offer Toileting every 3 Hours, in advance of need  - Initiate/Maintain bed alarm  - Obtain necessary fall risk management equipment: alarm   - Apply yellow socks and bracelet for high fall risk patients  - Consider moving patient to room near nurses station  8/1/2024 1756 by Darnell Livingston RN  Outcome: Progressing  8/1/2024 1756 by Darnell Livingston RN  Outcome: Progressing  Goal: Maintain or return to baseline ADL function  Description: INTERVENTIONS:  -  Assess patient's ability to carry out ADLs; assess patient's baseline for ADL function and identify physical deficits which impact ability to perform ADLs (bathing, care of mouth/teeth, toileting, grooming, dressing, etc.)  - Assess/evaluate cause of self-care deficits   - Assess range of motion  - Assess patient's mobility; develop plan if impaired  - Assess patient's need for assistive devices and provide as appropriate  - Encourage maximum independence but intervene and supervise when necessary  - Involve family in performance of ADLs  - Assess for home care needs following discharge   -  Consider OT consult to assist with ADL evaluation and planning for discharge  - Provide patient education as appropriate  8/1/2024 1756 by Darnell Livingston RN  Outcome: Progressing  8/1/2024 1756 by Darnell Livingston RN  Outcome: Progressing  Goal: Maintains/Returns to pre admission functional level  Description: INTERVENTIONS:  - Perform AM-PAC 6 Click Basic Mobility/ Daily Activity assessment daily.  - Set and communicate daily mobility goal to care team and patient/family/caregiver.   - Collaborate with rehabilitation services on mobility goals if consulted  - Perform Range of Motion 3 times a day.  - Reposition patient every 3 hours.  - Dangle patient 3 times a day  - Stand patient 3 times a day  - Ambulate patient 3 times a day  - Out of bed to chair 3 times a day   - Out of bed for meals 3 times a day  - Out of bed for toileting  - Record patient progress and toleration of activity level   8/1/2024 1756 by Darnell Livingston RN  Outcome: Progressing  8/1/2024 1756 by Darnell Livingston RN  Outcome: Progressing     Problem: DISCHARGE PLANNING  Goal: Discharge to home or other facility with appropriate resources  Description: INTERVENTIONS:  - Identify barriers to discharge w/patient and caregiver  - Arrange for needed discharge resources and transportation as appropriate  - Identify discharge learning needs (meds, wound care, etc.)  - Arrange for interpretive services to assist at discharge as needed  - Refer to Case Management Department for coordinating discharge planning if the patient needs post-hospital services based on physician/advanced practitioner order or complex needs related to functional status, cognitive ability, or social support system  8/1/2024 1756 by Darnell Livingston RN  Outcome: Progressing  8/1/2024 1756 by Darnell Livingston RN  Outcome: Progressing     Problem: Knowledge Deficit  Goal: Patient/family/caregiver demonstrates understanding of disease process, treatment plan,  medications, and discharge instructions  Description: Complete learning assessment and assess knowledge base.  Interventions:  - Provide teaching at level of understanding  - Provide teaching via preferred learning methods  Outcome: Progressing     Problem: CARDIOVASCULAR - ADULT  Goal: Maintains optimal cardiac output and hemodynamic stability  Description: INTERVENTIONS:  - Monitor I/O, vital signs and rhythm  - Monitor for S/S and trends of decreased cardiac output  - Administer and titrate ordered vasoactive medications to optimize hemodynamic stability  - Assess quality of pulses, skin color and temperature  - Assess for signs of decreased coronary artery perfusion  - Instruct patient to report change in severity of symptoms  Outcome: Progressing  Goal: Absence of cardiac dysrhythmias or at baseline rhythm  Description: INTERVENTIONS:  - Continuous cardiac monitoring, vital signs, obtain 12 lead EKG if ordered  - Administer antiarrhythmic and heart rate control medications as ordered  - Monitor electrolytes and administer replacement therapy as ordered  Outcome: Progressing     Problem: RESPIRATORY - ADULT  Goal: Achieves optimal ventilation and oxygenation  Description: INTERVENTIONS:  - Assess for changes in respiratory status  - Assess for changes in mentation and behavior  - Position to facilitate oxygenation and minimize respiratory effort  - Oxygen administered by appropriate delivery if ordered  - Initiate smoking cessation education as indicated  - Encourage broncho-pulmonary hygiene including cough, deep breathe, Incentive Spirometry  - Assess the need for suctioning and aspirate as needed  - Assess and instruct to report SOB or any respiratory difficulty  - Respiratory Therapy support as indicated  Outcome: Progressing     Problem: METABOLIC, FLUID AND ELECTROLYTES - ADULT  Goal: Electrolytes maintained within normal limits  Description: INTERVENTIONS:  - Monitor labs and assess patient for signs and  symptoms of electrolyte imbalances  - Administer electrolyte replacement as ordered  - Monitor response to electrolyte replacements, including repeat lab results as appropriate  - Instruct patient on fluid and nutrition as appropriate  Outcome: Progressing  Goal: Fluid balance maintained  Description: INTERVENTIONS:  - Monitor labs   - Monitor I/O and WT  - Instruct patient on fluid and nutrition as appropriate  - Assess for signs & symptoms of volume excess or deficit  Outcome: Progressing  Goal: Glucose maintained within target range  Description: INTERVENTIONS:  - Monitor Blood Glucose as ordered  - Assess for signs and symptoms of hyperglycemia and hypoglycemia  - Administer ordered medications to maintain glucose within target range  - Assess nutritional intake and initiate nutrition service referral as needed  Outcome: Progressing     Problem: HEMATOLOGIC - ADULT  Goal: Maintains hematologic stability  Description: INTERVENTIONS  - Assess for signs and symptoms of bleeding or hemorrhage  - Monitor labs  - Administer supportive blood products/factors as ordered and appropriate  Outcome: Progressing     Problem: Nutrition/Hydration-ADULT  Goal: Nutrient/Hydration intake appropriate for improving, restoring or maintaining nutritional needs  Description: Monitor and assess patient's nutrition/hydration status for malnutrition. Collaborate with interdisciplinary team and initiate plan and interventions as ordered.  Monitor patient's weight and dietary intake as ordered or per policy. Utilize nutrition screening tool and intervene as necessary. Determine patient's food preferences and provide high-protein, high-caloric foods as appropriate.     INTERVENTIONS:  - Monitor oral intake, urinary output, labs, and treatment plans  - Assess nutrition and hydration status and recommend course of action  - Evaluate amount of meals eaten  - Assist patient with eating if necessary   - Allow adequate time for meals  -  Recommend/ encourage appropriate diets, oral nutritional supplements, and vitamin/mineral supplements  - Order, calculate, and assess calorie counts as needed  - Recommend, monitor, and adjust tube feedings and TPN/PPN based on assessed needs  - Assess need for intravenous fluids  - Provide specific nutrition/hydration education as appropriate  - Include patient/family/caregiver in decisions related to nutrition  Outcome: Progressing

## 2024-08-01 NOTE — ASSESSMENT & PLAN NOTE
CKD stage 3a  Creatinine baseline is 1-1.2 prior to this admission  Monitor kidney indices  Avoid nephrotoxic medications  Creatinine down to 1.46 today  May be new baseline

## 2024-08-01 NOTE — ASSESSMENT & PLAN NOTE
ID on board, on oral doxycycline for planned prolonged course ideally until inflammatory markers normalized/plateau  Outpatient follow-up with ID on discharge

## 2024-08-02 ENCOUNTER — APPOINTMENT (EMERGENCY)
Dept: RADIOLOGY | Facility: HOSPITAL | Age: 74
DRG: 291 | End: 2024-08-02
Payer: MEDICARE

## 2024-08-02 ENCOUNTER — APPOINTMENT (EMERGENCY)
Dept: CT IMAGING | Facility: HOSPITAL | Age: 74
DRG: 291 | End: 2024-08-02
Payer: MEDICARE

## 2024-08-02 ENCOUNTER — HOSPITAL ENCOUNTER (INPATIENT)
Facility: HOSPITAL | Age: 74
LOS: 5 days | DRG: 291 | End: 2024-08-08
Attending: EMERGENCY MEDICINE | Admitting: STUDENT IN AN ORGANIZED HEALTH CARE EDUCATION/TRAINING PROGRAM
Payer: MEDICARE

## 2024-08-02 VITALS
DIASTOLIC BLOOD PRESSURE: 60 MMHG | TEMPERATURE: 98 F | HEIGHT: 65 IN | SYSTOLIC BLOOD PRESSURE: 98 MMHG | HEART RATE: 88 BPM | RESPIRATION RATE: 18 BRPM | BODY MASS INDEX: 34.89 KG/M2 | WEIGHT: 209.44 LBS | OXYGEN SATURATION: 100 %

## 2024-08-02 DIAGNOSIS — F32.A DEPRESSION: ICD-10-CM

## 2024-08-02 DIAGNOSIS — N18.9 ACUTE KIDNEY INJURY SUPERIMPOSED ON CHRONIC KIDNEY DISEASE  (HCC): ICD-10-CM

## 2024-08-02 DIAGNOSIS — N17.9 ACUTE KIDNEY INJURY SUPERIMPOSED ON CHRONIC KIDNEY DISEASE  (HCC): ICD-10-CM

## 2024-08-02 DIAGNOSIS — R09.02 HYPOXIA: ICD-10-CM

## 2024-08-02 DIAGNOSIS — J69.0 ASPIRATION PNEUMONIA (HCC): ICD-10-CM

## 2024-08-02 DIAGNOSIS — G93.40 ACUTE ENCEPHALOPATHY: Primary | ICD-10-CM

## 2024-08-02 DIAGNOSIS — N18.32 ACUTE RENAL FAILURE SUPERIMPOSED ON STAGE 3B CHRONIC KIDNEY DISEASE, UNSPECIFIED ACUTE RENAL FAILURE TYPE (HCC): ICD-10-CM

## 2024-08-02 DIAGNOSIS — N17.9 ACUTE RENAL FAILURE SUPERIMPOSED ON STAGE 3B CHRONIC KIDNEY DISEASE, UNSPECIFIED ACUTE RENAL FAILURE TYPE (HCC): ICD-10-CM

## 2024-08-02 DIAGNOSIS — I50.33 ACUTE ON CHRONIC DIASTOLIC HEART FAILURE (HCC): ICD-10-CM

## 2024-08-02 DIAGNOSIS — R41.89 UNRESPONSIVE: ICD-10-CM

## 2024-08-02 DIAGNOSIS — E83.42 HYPOMAGNESEMIA: ICD-10-CM

## 2024-08-02 DIAGNOSIS — J96.01 ACUTE RESPIRATORY FAILURE WITH HYPOXIA (HCC): ICD-10-CM

## 2024-08-02 DIAGNOSIS — J90 BILATERAL PLEURAL EFFUSION: ICD-10-CM

## 2024-08-02 LAB
2HR DELTA HS TROPONIN: -10 NG/L
ALBUMIN SERPL BCG-MCNC: 2.6 G/DL (ref 3.5–5)
ALP SERPL-CCNC: 93 U/L (ref 34–104)
ALT SERPL W P-5'-P-CCNC: 55 U/L (ref 7–52)
ANION GAP SERPL CALCULATED.3IONS-SCNC: 13 MMOL/L (ref 4–13)
ANION GAP SERPL CALCULATED.3IONS-SCNC: 7 MMOL/L (ref 4–13)
APTT PPP: 34 SECONDS (ref 23–34)
AST SERPL W P-5'-P-CCNC: 47 U/L (ref 13–39)
BASE EX.OXY STD BLDV CALC-SCNC: 89.3 % (ref 60–80)
BASE EXCESS BLDV CALC-SCNC: -2.9 MMOL/L
BASOPHILS # BLD AUTO: 0.02 THOUSANDS/ÂΜL (ref 0–0.1)
BASOPHILS NFR BLD AUTO: 0 % (ref 0–1)
BILIRUB SERPL-MCNC: 0.9 MG/DL (ref 0.2–1)
BNP SERPL-MCNC: 454 PG/ML (ref 0–100)
BUN SERPL-MCNC: 28 MG/DL (ref 5–25)
BUN SERPL-MCNC: 29 MG/DL (ref 5–25)
CALCIUM ALBUM COR SERPL-MCNC: 10 MG/DL (ref 8.3–10.1)
CALCIUM SERPL-MCNC: 8.9 MG/DL (ref 8.4–10.2)
CALCIUM SERPL-MCNC: 9.2 MG/DL (ref 8.4–10.2)
CARDIAC TROPONIN I PNL SERPL HS: 512 NG/L
CARDIAC TROPONIN I PNL SERPL HS: 522 NG/L
CHLORIDE SERPL-SCNC: 110 MMOL/L (ref 96–108)
CHLORIDE SERPL-SCNC: 110 MMOL/L (ref 96–108)
CO2 SERPL-SCNC: 24 MMOL/L (ref 21–32)
CO2 SERPL-SCNC: 27 MMOL/L (ref 21–32)
CREAT SERPL-MCNC: 1.48 MG/DL (ref 0.6–1.3)
CREAT SERPL-MCNC: 1.61 MG/DL (ref 0.6–1.3)
D DIMER PPP FEU-MCNC: 1.71 UG/ML FEU
EOSINOPHIL # BLD AUTO: 0.09 THOUSAND/ÂΜL (ref 0–0.61)
EOSINOPHIL NFR BLD AUTO: 1 % (ref 0–6)
ERYTHROCYTE [DISTWIDTH] IN BLOOD BY AUTOMATED COUNT: 17.8 % (ref 11.6–15.1)
ERYTHROCYTE [DISTWIDTH] IN BLOOD BY AUTOMATED COUNT: 18 % (ref 11.6–15.1)
GFR SERPL CREATININE-BSD FRML MDRD: 30 ML/MIN/1.73SQ M
GFR SERPL CREATININE-BSD FRML MDRD: 34 ML/MIN/1.73SQ M
GLUCOSE SERPL-MCNC: 73 MG/DL (ref 65–140)
GLUCOSE SERPL-MCNC: 75 MG/DL (ref 65–140)
HCO3 BLDV-SCNC: 21 MMOL/L (ref 24–30)
HCT VFR BLD AUTO: 28.2 % (ref 34.8–46.1)
HCT VFR BLD AUTO: 29.2 % (ref 34.8–46.1)
HGB BLD-MCNC: 8.7 G/DL (ref 11.5–15.4)
HGB BLD-MCNC: 8.9 G/DL (ref 11.5–15.4)
IMM GRANULOCYTES # BLD AUTO: 0.06 THOUSAND/UL (ref 0–0.2)
IMM GRANULOCYTES NFR BLD AUTO: 1 % (ref 0–2)
INR PPP: 2.08 (ref 0.85–1.19)
LYMPHOCYTES # BLD AUTO: 1.1 THOUSANDS/ÂΜL (ref 0.6–4.47)
LYMPHOCYTES NFR BLD AUTO: 13 % (ref 14–44)
MAGNESIUM SERPL-MCNC: 1.6 MG/DL (ref 1.9–2.7)
MAGNESIUM SERPL-MCNC: 1.6 MG/DL (ref 1.9–2.7)
MCH RBC QN AUTO: 27.6 PG (ref 26.8–34.3)
MCH RBC QN AUTO: 27.6 PG (ref 26.8–34.3)
MCHC RBC AUTO-ENTMCNC: 30.5 G/DL (ref 31.4–37.4)
MCHC RBC AUTO-ENTMCNC: 30.9 G/DL (ref 31.4–37.4)
MCV RBC AUTO: 90 FL (ref 82–98)
MCV RBC AUTO: 90 FL (ref 82–98)
MONOCYTES # BLD AUTO: 0.37 THOUSAND/ÂΜL (ref 0.17–1.22)
MONOCYTES NFR BLD AUTO: 4 % (ref 4–12)
NEUTROPHILS # BLD AUTO: 6.8 THOUSANDS/ÂΜL (ref 1.85–7.62)
NEUTS SEG NFR BLD AUTO: 81 % (ref 43–75)
NRBC BLD AUTO-RTO: 1 /100 WBCS
O2 CT BLDV-SCNC: 12.5 ML/DL
PCO2 BLDV: 33.3 MM HG (ref 42–50)
PH BLDV: 7.42 [PH] (ref 7.3–7.4)
PLATELET # BLD AUTO: 117 THOUSANDS/UL (ref 149–390)
PLATELET # BLD AUTO: 150 THOUSANDS/UL (ref 149–390)
PO2 BLDV: 67.9 MM HG (ref 35–45)
POTASSIUM SERPL-SCNC: 3.9 MMOL/L (ref 3.5–5.3)
POTASSIUM SERPL-SCNC: 3.9 MMOL/L (ref 3.5–5.3)
PROT SERPL-MCNC: 5.3 G/DL (ref 6.4–8.4)
PROTHROMBIN TIME: 23.3 SECONDS (ref 12.3–15)
RBC # BLD AUTO: 3.15 MILLION/UL (ref 3.81–5.12)
RBC # BLD AUTO: 3.23 MILLION/UL (ref 3.81–5.12)
SODIUM SERPL-SCNC: 144 MMOL/L (ref 135–147)
SODIUM SERPL-SCNC: 147 MMOL/L (ref 135–147)
WBC # BLD AUTO: 6.67 THOUSAND/UL (ref 4.31–10.16)
WBC # BLD AUTO: 8.44 THOUSAND/UL (ref 4.31–10.16)

## 2024-08-02 PROCEDURE — 82805 BLOOD GASES W/O2 SATURATION: CPT | Performed by: EMERGENCY MEDICINE

## 2024-08-02 PROCEDURE — 83880 ASSAY OF NATRIURETIC PEPTIDE: CPT | Performed by: EMERGENCY MEDICINE

## 2024-08-02 PROCEDURE — 96365 THER/PROPH/DIAG IV INF INIT: CPT

## 2024-08-02 PROCEDURE — 83735 ASSAY OF MAGNESIUM: CPT | Performed by: STUDENT IN AN ORGANIZED HEALTH CARE EDUCATION/TRAINING PROGRAM

## 2024-08-02 PROCEDURE — 80048 BASIC METABOLIC PNL TOTAL CA: CPT | Performed by: EMERGENCY MEDICINE

## 2024-08-02 PROCEDURE — 85379 FIBRIN DEGRADATION QUANT: CPT | Performed by: EMERGENCY MEDICINE

## 2024-08-02 PROCEDURE — 71275 CT ANGIOGRAPHY CHEST: CPT

## 2024-08-02 PROCEDURE — 02HV33Z INSERTION OF INFUSION DEVICE INTO SUPERIOR VENA CAVA, PERCUTANEOUS APPROACH: ICD-10-PCS | Performed by: EMERGENCY MEDICINE

## 2024-08-02 PROCEDURE — 94640 AIRWAY INHALATION TREATMENT: CPT

## 2024-08-02 PROCEDURE — 80053 COMPREHEN METABOLIC PANEL: CPT | Performed by: STUDENT IN AN ORGANIZED HEALTH CARE EDUCATION/TRAINING PROGRAM

## 2024-08-02 PROCEDURE — 93005 ELECTROCARDIOGRAM TRACING: CPT

## 2024-08-02 PROCEDURE — 99285 EMERGENCY DEPT VISIT HI MDM: CPT

## 2024-08-02 PROCEDURE — 85610 PROTHROMBIN TIME: CPT | Performed by: EMERGENCY MEDICINE

## 2024-08-02 PROCEDURE — 96367 TX/PROPH/DG ADDL SEQ IV INF: CPT

## 2024-08-02 PROCEDURE — 70450 CT HEAD/BRAIN W/O DYE: CPT

## 2024-08-02 PROCEDURE — 99239 HOSP IP/OBS DSCHRG MGMT >30: CPT | Performed by: STUDENT IN AN ORGANIZED HEALTH CARE EDUCATION/TRAINING PROGRAM

## 2024-08-02 PROCEDURE — 85027 COMPLETE CBC AUTOMATED: CPT | Performed by: STUDENT IN AN ORGANIZED HEALTH CARE EDUCATION/TRAINING PROGRAM

## 2024-08-02 PROCEDURE — 71045 X-RAY EXAM CHEST 1 VIEW: CPT

## 2024-08-02 PROCEDURE — 83735 ASSAY OF MAGNESIUM: CPT | Performed by: EMERGENCY MEDICINE

## 2024-08-02 PROCEDURE — 85730 THROMBOPLASTIN TIME PARTIAL: CPT | Performed by: EMERGENCY MEDICINE

## 2024-08-02 PROCEDURE — 85025 COMPLETE CBC W/AUTO DIFF WBC: CPT | Performed by: EMERGENCY MEDICINE

## 2024-08-02 PROCEDURE — 36556 INSERT NON-TUNNEL CV CATH: CPT | Performed by: EMERGENCY MEDICINE

## 2024-08-02 PROCEDURE — 96368 THER/DIAG CONCURRENT INF: CPT

## 2024-08-02 PROCEDURE — 84484 ASSAY OF TROPONIN QUANT: CPT | Performed by: EMERGENCY MEDICINE

## 2024-08-02 RX ORDER — ACETAMINOPHEN 10 MG/ML
1000 INJECTION, SOLUTION INTRAVENOUS ONCE
Status: COMPLETED | OUTPATIENT
Start: 2024-08-02 | End: 2024-08-03

## 2024-08-02 RX ORDER — LANOLIN ALCOHOL/MO/W.PET/CERES
400 CREAM (GRAM) TOPICAL ONCE
Status: DISCONTINUED | OUTPATIENT
Start: 2024-08-02 | End: 2024-08-03

## 2024-08-02 RX ORDER — LEVALBUTEROL INHALATION SOLUTION 1.25 MG/3ML
1.25 SOLUTION RESPIRATORY (INHALATION) EVERY 8 HOURS PRN
Status: ON HOLD
Start: 2024-08-02

## 2024-08-02 RX ORDER — METOPROLOL TARTRATE 1 MG/ML
5 INJECTION, SOLUTION INTRAVENOUS ONCE
Status: DISCONTINUED | OUTPATIENT
Start: 2024-08-02 | End: 2024-08-03

## 2024-08-02 RX ORDER — DOXYCYCLINE HYCLATE 100 MG/1
100 CAPSULE ORAL ONCE
Status: DISCONTINUED | OUTPATIENT
Start: 2024-08-02 | End: 2024-08-03

## 2024-08-02 RX ORDER — LANOLIN ALCOHOL/MO/W.PET/CERES
400 CREAM (GRAM) TOPICAL 2 TIMES DAILY
Status: ON HOLD
Start: 2024-08-02

## 2024-08-02 RX ORDER — METRONIDAZOLE 500 MG/100ML
500 INJECTION, SOLUTION INTRAVENOUS EVERY 8 HOURS
Status: DISCONTINUED | OUTPATIENT
Start: 2024-08-03 | End: 2024-08-03

## 2024-08-02 RX ORDER — DOXYCYCLINE HYCLATE 100 MG/1
100 CAPSULE ORAL 2 TIMES DAILY
Status: ON HOLD
Start: 2024-08-02 | End: 2024-09-01

## 2024-08-02 RX ORDER — LEVALBUTEROL INHALATION SOLUTION 1.25 MG/3ML
1.25 SOLUTION RESPIRATORY (INHALATION) ONCE
Status: COMPLETED | OUTPATIENT
Start: 2024-08-02 | End: 2024-08-02

## 2024-08-02 RX ORDER — SODIUM CHLORIDE, SODIUM GLUCONATE, SODIUM ACETATE, POTASSIUM CHLORIDE, MAGNESIUM CHLORIDE, SODIUM PHOSPHATE, DIBASIC, AND POTASSIUM PHOSPHATE .53; .5; .37; .037; .03; .012; .00082 G/100ML; G/100ML; G/100ML; G/100ML; G/100ML; G/100ML; G/100ML
500 INJECTION, SOLUTION INTRAVENOUS ONCE
Status: COMPLETED | OUTPATIENT
Start: 2024-08-03 | End: 2024-08-03

## 2024-08-02 RX ADMIN — Medication: at 09:09

## 2024-08-02 RX ADMIN — APIXABAN 5 MG: 5 TABLET, FILM COATED ORAL at 08:58

## 2024-08-02 RX ADMIN — PREDNISONE 5 MG: 5 TABLET ORAL at 08:58

## 2024-08-02 RX ADMIN — Medication 400 MG: at 08:58

## 2024-08-02 RX ADMIN — CHLORHEXIDINE GLUCONATE 15 ML: 1.2 RINSE ORAL at 09:09

## 2024-08-02 RX ADMIN — DEXTROSE 1000 MG: 50 INJECTION, SOLUTION INTRAVENOUS at 23:57

## 2024-08-02 RX ADMIN — FUROSEMIDE 20 MG: 20 TABLET ORAL at 08:58

## 2024-08-02 RX ADMIN — LEVALBUTEROL HYDROCHLORIDE 1.25 MG: 1.25 SOLUTION RESPIRATORY (INHALATION) at 22:47

## 2024-08-02 RX ADMIN — DOXYCYCLINE 100 MG: 100 CAPSULE ORAL at 08:58

## 2024-08-02 RX ADMIN — IOHEXOL 68 ML: 350 INJECTION, SOLUTION INTRAVENOUS at 21:28

## 2024-08-02 RX ADMIN — SODIUM CHLORIDE, SODIUM GLUCONATE, SODIUM ACETATE, POTASSIUM CHLORIDE, MAGNESIUM CHLORIDE, SODIUM PHOSPHATE, DIBASIC, AND POTASSIUM PHOSPHATE 500 ML: .53; .5; .37; .037; .03; .012; .00082 INJECTION, SOLUTION INTRAVENOUS at 23:54

## 2024-08-02 RX ADMIN — PANTOPRAZOLE SODIUM 40 MG: 40 TABLET, DELAYED RELEASE ORAL at 06:48

## 2024-08-02 RX ADMIN — ACETAMINOPHEN 1000 MG: 10 INJECTION INTRAVENOUS at 22:47

## 2024-08-02 RX ADMIN — METOPROLOL TARTRATE 25 MG: 25 TABLET, FILM COATED ORAL at 08:58

## 2024-08-02 NOTE — ASSESSMENT & PLAN NOTE
Wt Readings from Last 3 Encounters:   08/02/24 95 kg (209 lb 7 oz)   07/11/24 108 kg (238 lb)   07/05/24 108 kg (238 lb 1.6 oz)     7/22 2D echo revealed ejection fraction greater than 75%, wall motion normal  Cardiology following  Was on diuretic gtt however now on po lasix with improvement in weights  Continue daily weights, I&Os

## 2024-08-02 NOTE — DISCHARGE INSTR - AVS FIRST PAGE
She will need to continue taking doxycycline po indefinitely and will need to follow-up with ID on 8/16

## 2024-08-02 NOTE — DISCHARGE SUMMARY
[facility]  Discharge- Bhavna BOUDREAUX Mikaela 1950, 74 y.o. female MRN: [id number]  Unit/Bed#: E4 -01 Encounter: [id number]  Primary Care Provider: Nya Taveras DO   Date and time admitted to hospital: 7/21/2024  8:57 AM    Pain and swelling of left upper extremity  Assessment & Plan  Suspect superficial thrombophlebitis vs. Dependent edema vs. Swelling from IV and lines placed throughout patient's stay  No erythema, warmth to suggest clot or infection  Venous duplex with no evidence of clot in visualized parts. Patient refusing exam of forearm  Rest, ice, elevation ordered  Supportive care, frequent repositioning, pain management    Elevated troponin  Assessment & Plan  Troponin trend 1374->1239->296  Likely non-MI troponin elevation in setting of sepsis and acute kidney injury    Chronic pruritic rash in adult  Assessment & Plan  She has psoriasis proven by skin biopsy  use Lac-Hydrin lotion as needed    Acute kidney injury superimposed on chronic kidney disease  (HCC)  Assessment & Plan  CKD stage 3a  Creatinine baseline is 1-1.2 prior to this admission  Monitor kidney indices  Avoid nephrotoxic medications  Creatinine down to 1.46 today  May be new baseline    Persistent atrial fibrillation  Assessment & Plan  Continue home regimen of lopressor 25 mg BID, Hrs controlled on this regimen  Dig loaded on 7/26-27  Continue Eliquis for   Cardiology team on board-appreciate recommendations  Continuous cardiopulmonary monitoring    MRSA bacteremia  Assessment & Plan  ID on board, on oral doxycycline for planned prolonged course ideally until inflammatory markers normalized/plateau  Outpatient follow-up with ID on discharge    Ambulatory dysfunction  Assessment & Plan  Patient is bed bound, wheelchair bound and she is vamshi lift at [facility] at baseline.  No further PT needs.    Acute on chronic diastolic heart failure, LVEF 65%, LVIDd 3.3 cm, AHA Stage C  Assessment & Plan  Wt  Readings from Last 3 Encounters:   08/02/24 95 kg (209 lb 7 oz)   07/11/24 108 kg (238 lb)   07/05/24 108 kg (238 lb 1.6 oz)     7/22 2D echo revealed ejection fraction greater than 75%, wall motion normal  Cardiology following  Was on diuretic gtt however now on po lasix with improvement in weights  Continue daily weights, I&Os    Morbid obesity (HCC)  Assessment & Plan  Recommend decrease caloric intake and increase activity     Rheumatoid arthritis (HCC)  Assessment & Plan  Continue Plaquenil 200 mg  Prior to admission on prednisone 5 mg twice a day  Does have psoriasis    * encephalopathy  Assessment & Plan  Acute encephalopathy possibly secondary to worsening hypoactive delirium  Transferred to the ICU for closer monitoring  Patient currently more , awake an alert, oriented, but fatigued and reluctant to talk  Ammonia levels within normal limits, VBG did not show hypercarbia  7/29-passed speech and swallow evaluation with dysphagia diet.  All medications were changed to PO       Medical Problems       Resolved Problems  Date Reviewed: 7/27/2024   None       Discharging Physician / Practitioner: Vane Ramirez MD  PCP: Nya Taveras DO  Admission Date:   Admission Orders (From admission, onward)       Ordered        07/21/24 1111  INPATIENT ADMISSION  Once                          Discharge Date: 08/02/24    Consultations During Hospital Stay:  Critical care  Oncology  ID  Cardiology  Palliative Care    Significant Findings / Test Results:   VAS upper limb venous duplex scan, unilateral/limited   Final Result by Krista Cano MD (08/01 1949)      CT head wo contrast   Final Result by Obey Duggan DO (07/27 1312)      No acute intracranial abnormality. Stable mild white matter change consistent with chronic microangiopathy.                  Workstation performed: [id number]         XR chest portable ICU   Final Result by Osman Deluca MD (07/23 1303)      Low lung  volumes. Pulmonary vascular congestion. Mild persistent right upper lobe opacities may be due to edema or pneumonia. No new abnormality.            Workstation performed: [id number]         XR chest portable ICU   Final Result by Lorena John MD (07/22 0916)      Right jugular catheter at cavoatrial junction. No pneumothorax.            Workstation performed: [id number]         CT chest abdomen pelvis wo contrast   Final Result by Osman Deluca MD (07/21 1327)      1.  No acute findings in the abdomen or pelvis.   2.  Stable appearance of the chest with mild fluid overload and nonspecific right upper lobe groundglass opacities.               Workstation performed: [id number]         XR chest portable   Final Result by Lorena John MD (07/21 0540)      Mild bilateral subsegmental atelectasis.      Trace effusions better shown on CT.            Workstation performed: [id number]             Test Results Pending at Discharge (will require follow up):   none     Outpatient Tests Requested:  Defer to ID    Complications:  none    Reason for Admission: sepsis    Hospital Course:   Bhavna Al is a 74 y.o. female patient who originally presented to the hospital on 7/21/2024 due to abdominal pain and hypotension from STR. She has PMHx of MRSA bactermia suspected secondary to osteomyelitis, chronic diastolic heart failure, A-fib on Eliquis, GERD, HTN, RA on Plaquenil and chronic corticosteroid use.  A rapid response was called while she was in the ED due to hypotension but manual blood pressures were okay and she was admitted to the medical surgical unit.  Later in the day and at the rapid response was called due to increased work of breathing after she had received IV fluids.  She was given IV Lasix after which her breathing improved but she became hypotensive requiring IV pressors and admitted to the ICU.  Patient was noted to have atrial fibrillation with rapid ventricular response and given 1 dose of  "digoxin. Patient was admitted for shock requiring pressors which were weaned off on day 2 of admission.  Due to her history of MRSA bacteremia and she had recently finished vancomycin, patient was started on cefepime and vancomycin and infectious disease was consulted.  Blood cultures and urine cultures have been negative.  Antibiotics discontinued and she has been started on doxycycline per ID which will continue in the outpatient setting until her ESR normalizes.  She underwent cosyntropin stimulation testing to check for adrenal insufficiency due to chronic corticosteroid use.  She is placed back on her home dose steroid. Switched from heparin back to eliquis due to thrombocytopenia.  Hematology on board but suspect it to be due to bone marrow suppression from antibiotic use.  Due to low UOP she is given Bumex with good response so today she will receive 2 doses. Had some confusion which was suspected to be delirium. Which has since resolved and patient back to baseline mental status.     Patient has been stable and at baseline and will return to [place] today.     Please see above list of diagnoses and related plan for additional information.     Condition at Discharge: stable    Discharge Day Visit / Exam:     Subjective:  Seen and examined at bedside. States pain over lef ady extremity improved with elevation and ice.     Vitals: Blood Pressure: 98/60 (08/02/24 0755)  Pulse: 88 (08/02/24 0755)  Temperature: 98 °F (36.7 °C) (08/02/24 0755)  Temp Source: Temporal (08/02/24 0755)  Respirations: 18 (08/02/24 0755)  Height: 5' 5\" (165.1 cm) (07/22/24 1522)  Weight - Scale: 95 kg (209 lb 7 oz) (08/02/24 0600)  SpO2: 100 % (08/02/24 0755)    Exam:   Physical Exam  Vitals and nursing note reviewed.   Constitutional:       General: She is not in acute distress.     Appearance: She is well-developed. She is obese.   HENT:      Head: Normocephalic and atraumatic.   Eyes:      Conjunctiva/sclera: Conjunctivae normal. "   Cardiovascular:      Rate and Rhythm: Normal rate and regular rhythm.      Heart sounds: No murmur heard.  Pulmonary:      Effort: Pulmonary effort is normal. No respiratory distress.      Breath sounds: Normal breath sounds.   Abdominal:      Palpations: Abdomen is soft.      Tenderness: There is no abdominal tenderness.   Musculoskeletal:         General: Swelling present.      Cervical back: Neck supple.      Comments: Left upper extremity swelling improved from prior exam  No warmth or erythema   Skin:     General: Skin is warm and dry.      Capillary Refill: Capillary refill takes less than 2 seconds.   Neurological:      Mental Status: She is alert.   Psychiatric:         Mood and Affect: Mood normal.            Discussion with Family: Updated  (granddaughter) at bedside.    Discharge instructions/Information to patient and family:   See after visit summary for information provided to patient and family.      Provisions for Follow-Up Care:  See after visit summary for information related to follow-up care and any pertinent home health orders.      Mobility at time of Discharge:   Basic Mobility Inpatient Raw Score: 6  JH-HLM Goal: 2: Bed activities/Dependent transfer  JH-HLM Achieved: 1: Laying in bed  HLM Goal NOT achieved. Continue to encourage mobility in post discharge setting.     Disposition:   Other Skilled Nursing Facility at [place]    Planned Readmission: none     Discharge Statement:  I spent 65 minutes discharging the patient. This time was spent on the day of discharge. I had direct contact with the patient on the day of discharge. Greater than 50% of the total time was spent examining patient, answering all patient questions, arranging and discussing plan of care with patient as well as directly providing post-discharge instructions.  Additional time then spent on discharge activities.    Discharge Medications:  See after visit summary for reconciled discharge medications  provided to patient and/or family.      **Please Note: This note may have been constructed using a voice recognition system**

## 2024-08-02 NOTE — Clinical Note
Case was discussed with AZIZA and the patient's admission status was agreed to be Admission Status: inpatient status to the service of Dr. Fisher.

## 2024-08-02 NOTE — PLAN OF CARE
Problem: Prexisting or High Potential for Compromised Skin Integrity  Goal: Skin integrity is maintained or improved  Description: INTERVENTIONS:  - Identify patients at risk for skin breakdown  - Assess and monitor skin integrity  - Assess and monitor nutrition and hydration status  - Monitor labs   - Assess for incontinence   - Turn and reposition patient  - Assist with mobility/ambulation  - Relieve pressure over bony prominences  - Avoid friction and shearing  - Provide appropriate hygiene as needed including keeping skin clean and dry  - Evaluate need for skin moisturizer/barrier cream  - Collaborate with interdisciplinary team   - Patient/family teaching  - Consider wound care consult   Outcome: Progressing     Problem: PAIN - ADULT  Goal: Verbalizes/displays adequate comfort level or baseline comfort level  Description: Interventions:  - Encourage patient to monitor pain and request assistance  - Assess pain using appropriate pain scale  - Administer analgesics based on type and severity of pain and evaluate response  - Implement non-pharmacological measures as appropriate and evaluate response  - Consider cultural and social influences on pain and pain management  - Notify physician/advanced practitioner if interventions unsuccessful or patient reports new pain  Outcome: Progressing     Problem: INFECTION - ADULT  Goal: Absence or prevention of progression during hospitalization  Description: INTERVENTIONS:  - Assess and monitor for signs and symptoms of infection  - Monitor lab/diagnostic results  - Monitor all insertion sites, i.e. indwelling lines, tubes, and drains  - Monitor endotracheal if appropriate and nasal secretions for changes in amount and color  - [place] appropriate cooling/warming therapies per order  - Administer medications as ordered  - Instruct and encourage patient and family to use good hand hygiene technique  - Identify and instruct in appropriate isolation precautions for  identified infection/condition  Outcome: Progressing     Problem: SAFETY ADULT  Goal: Patient will remain free of falls  Description: INTERVENTIONS:  - Educate patient/family on patient safety including physical limitations  - Instruct patient to call for assistance with activity   - Consult OT/PT to assist with strengthening/mobility   - Keep Call bell within reach  - Keep bed low and locked with side rails adjusted as appropriate  - Keep care items and personal belongings within reach  - Initiate and maintain comfort rounds  - Make Fall Risk Sign visible to staff  - Offer Toileting every  Hours, in advance of need  - Initiate/Maintain alarm  - Obtain necessary fall risk management equipment:   - Apply yellow socks and bracelet for high fall risk patients  - Consider moving patient to room near nurses station  Outcome: Progressing  Goal: Maintain or return to baseline ADL function  Description: INTERVENTIONS:  -  Assess patient's ability to carry out ADLs; assess patient's baseline for ADL function and identify physical deficits which impact ability to perform ADLs (bathing, care of mouth/teeth, toileting, grooming, dressing, etc.)  - Assess/evaluate cause of self-care deficits   - Assess range of motion  - Assess patient's mobility; develop plan if impaired  - Assess patient's need for assistive devices and provide as appropriate  - Encourage maximum independence but intervene and supervise when necessary  - Involve family in performance of ADLs  - Assess for home care needs following discharge   - Consider OT consult to assist with ADL evaluation and planning for discharge  - Provide patient education as appropriate  Outcome: Progressing  Goal: Maintains/Returns to pre admission functional level  Description: INTERVENTIONS:  - Perform AM-PAC 6 Click Basic Mobility/ Daily Activity assessment daily.  - Set and communicate daily mobility goal to care team and patient/family/caregiver.   - Collaborate with  rehabilitation services on mobility goals if consulted  - Perform Range of Motion  times a day.  - Reposition patient every  hours.  - Dangle patient  times a day  - Stand patien times a day  - Ambulate patient  times a day  - Out of bed to chair  times a day   - Out of bed for meals  times a day  - Out of bed for toileting  - Record patient progress and toleration of activity level   Outcome: Progressing     Problem: DISCHARGE PLANNING  Goal: Discharge to home or other facility with appropriate resources  Description: INTERVENTIONS:  - Identify barriers to discharge w/patient and caregiver  - Arrange for needed discharge resources and transportation as appropriate  - Identify discharge learning needs (meds, wound care, etc.)  - Arrange for interpretive services to assist at discharge as needed  - Refer to Case Management Department for coordinating discharge planning if the patient needs post-hospital services based on physician/advanced practitioner order or complex needs related to functional status, cognitive ability, or social support system  Outcome: Progressing     Problem: Knowledge Deficit  Goal: Patient/family/caregiver demonstrates understanding of disease process, treatment plan, medications, and discharge instructions  Description: Complete learning assessment and assess knowledge base.  Interventions:  - Provide teaching at level of understanding  - Provide teaching via preferred learning methods  Outcome: Progressing     Problem: CARDIOVASCULAR - ADULT  Goal: Maintains optimal cardiac output and hemodynamic stability  Description: INTERVENTIONS:  - Monitor I/O, vital signs and rhythm  - Monitor for S/S and trends of decreased cardiac output  - Administer and titrate ordered vasoactive medications to optimize hemodynamic stability  - Assess quality of pulses, skin color and temperature  - Assess for signs of decreased coronary artery perfusion  - Instruct patient to report change in severity of  symptoms  Outcome: Progressing  Goal: Absence of cardiac dysrhythmias or at baseline rhythm  Description: INTERVENTIONS:  - Continuous cardiac monitoring, vital signs, obtain 12 lead EKG if ordered  - Administer antiarrhythmic and heart rate control medications as ordered  - Monitor electrolytes and administer replacement therapy as ordered  Outcome: Progressing     Problem: RESPIRATORY - ADULT  Goal: Achieves optimal ventilation and oxygenation  Description: INTERVENTIONS:  - Assess for changes in respiratory status  - Assess for changes in mentation and behavior  - Position to facilitate oxygenation and minimize respiratory effort  - Oxygen administered by appropriate delivery if ordered  - Initiate smoking cessation education as indicated  - Encourage broncho-pulmonary hygiene including cough, deep breathe, Incentive Spirometry  - Assess the need for suctioning and aspirate as needed  - Assess and instruct to report SOB or any respiratory difficulty  - Respiratory Therapy support as indicated  Outcome: Progressing     Problem: METABOLIC, FLUID AND ELECTROLYTES - ADULT  Goal: Electrolytes maintained within normal limits  Description: INTERVENTIONS:  - Monitor labs and assess patient for signs and symptoms of electrolyte imbalances  - Administer electrolyte replacement as ordered  - Monitor response to electrolyte replacements, including repeat lab results as appropriate  - Instruct patient on fluid and nutrition as appropriate  Outcome: Progressing  Goal: Fluid balance maintained  Description: INTERVENTIONS:  - Monitor labs   - Monitor I/O and WT  - Instruct patient on fluid and nutrition as appropriate  - Assess for signs & symptoms of volume excess or deficit  Outcome: Progressing  Goal: Glucose maintained within target range  Description: INTERVENTIONS:  - Monitor Blood Glucose as ordered  - Assess for signs and symptoms of hyperglycemia and hypoglycemia  - Administer ordered medications to maintain glucose  within target range  - Assess nutritional intake and initiate nutrition service referral as needed  Outcome: Progressing     Problem: HEMATOLOGIC - ADULT  Goal: Maintains hematologic stability  Description: INTERVENTIONS  - Assess for signs and symptoms of bleeding or hemorrhage  - Monitor labs  - Administer supportive blood products/factors as ordered and appropriate  Outcome: Progressing     Problem: Nutrition/Hydration-ADULT  Goal: Nutrient/Hydration intake appropriate for improving, restoring or maintaining nutritional needs  Description: Monitor and assess patient's nutrition/hydration status for malnutrition. Collaborate with interdisciplinary team and initiate plan and interventions as ordered.  Monitor patient's weight and dietary intake as ordered or per policy. Utilize nutrition screening tool and intervene as necessary. Determine patient's food preferences and provide high-protein, high-caloric foods as appropriate.     INTERVENTIONS:  - Monitor oral intake, urinary output, labs, and treatment plans  - Assess nutrition and hydration status and recommend course of action  - Evaluate amount of meals eaten  - Assist patient with eating if necessary   - Allow adequate time for meals  - Recommend/ encourage appropriate diets, oral nutritional supplements, and vitamin/mineral supplements  - Order, calculate, and assess calorie counts as needed  - Recommend, monitor, and adjust tube feedings and TPN/PPN based on assessed needs  - Assess need for intravenous fluids  - Provide specific nutrition/hydration education as appropriate  - Include patient/family/caregiver in decisions related to nutrition  Outcome: Progressing

## 2024-08-02 NOTE — ED NOTES
Pt at 88% on 15L nonrebreather. Respiratory contacted at this time to put pt on high flow.           Janet Jalloh RN  08/02/24 [id number]

## 2024-08-02 NOTE — RESTORATIVE TECHNICIAN NOTE
Restorative Technician Note      Patient Name: Bhavna BOUDREAUX Al     Restorative Tech Visit Date: 08/02/24  Note Type: Mobility  Patient Position Upon Consult: Supine  Activity Performed: Range of motion; Repositioned  Patient Position at End of Consult: All needs within reach; Supine

## 2024-08-02 NOTE — ED PROVIDER NOTES
History  Chief Complaint   Patient presents with    Altered Mental Status     Pt arrives via EMS from [facility] with new onset confusion. PT just dc from this hospital today. Facility reports pt is usually axo4 normally but could not recognize family. Pt alert only to herself during triage. Pt c/o L arm swelling and pain. During admission pt refuse US on L arm.        History provided by:  Patient, relative, medical records and nursing home  History limited by:  Mental status change   used: No      74 year-old female sent from nursing facility for evaluation of mental status change today noting that she was disoriented to place and family.  She had been discharged from the hospital today after being admitted for 12 days for an episode of poor responsiveness, encephalopathy, elevated troponin, AMANDA, left upper extremity swelling.  Day of discharge exam noting that patient was alert prior to discharge.  She is very drowsy here, responds to noxious stimuli and voice and follows some commands.  She is oriented to herself and place but does not answer more detailed questions.  Oxygen saturation low 80s.  Minimal improvement with nasal cannula.  Was transition to nonrebreather mask and ultimately to mid flow with subsequent improvement.  She has swelling of the left forearm and hand with exquisite tenderness.  Apparently ultrasound was attempted but she could not tolerate the exam.    Prior to Admission Medications   Prescriptions Last Dose Informant Patient Reported? Taking?   Zinc 50 MG TABS  Self, Outside Facility (Specify), Family Member Yes No   Sig: Take 1 tablet by mouth in the morning   acetaminophen (TYLENOL) 325 mg tablet  Self, Outside Facility (Specify), Family Member No No   Sig: Take 2 tablets (650 mg total) by mouth every 4 (four) hours as needed for mild pain, headaches or fever.   Patient taking differently: Take 650 mg by mouth every 6 (six) hours as needed for mild pain, headaches or  fever   albuterol (Ventolin HFA) 90 mcg/act inhaler  Outside Facility (Specify), Self, Family Member No No   Sig: Inhale 2 puffs every 6 (six) hours as needed for wheezing   alendronate (FOSAMAX) 70 mg tablet  Self, Outside Facility (Specify), Family Member Yes No   Sig: Take 70 mg by mouth every 7 days Do not start before July 28, 2024.   ammonium lactate (LAC-HYDRIN) 12 % cream  Self, Outside Facility (Specify), Family Member No No   Sig: Apply topically 2 (two) times a day   apixaban (ELIQUIS) 5 mg  Outside Facility (Specify), Self, Family Member No No   Sig: Take 1 tablet (5 mg total) by mouth 2 (two) times a day   cholecalciferol (VITAMIN D3) 1,000 units tablet  Outside Facility (Specify), Self, Family Member No No   Sig: Take 1 tablet (1,000 Units total) by mouth daily   clobetasol (TEMOVATE) 0.05 % cream  Self, Outside Facility (Specify), Family Member No No   Sig: Apply topically 2 (two) times a day   doxycycline hyclate (VIBRAMYCIN) 100 mg capsule   No No   Sig: Take 1 capsule (100 mg total) by mouth 2 (two) times a day Patient will need to continue on this medication indefinitely until outpatient follow-up with ID   furosemide (LASIX) 20 mg tablet  Self, Outside Facility (Specify), Family Member No No   Sig: Take 1 tablet (20 mg total) by mouth daily   levalbuterol (XOPENEX) 1.25 mg/3 mL nebulizer solution   No No   Sig: Take 3 mL (1.25 mg total) by nebulization every 8 (eight) hours as needed for wheezing or shortness of breath   lidocaine (LIDODERM) 5 %  Self, Outside Facility (Specify), Family Member No No   Sig: Apply 2 patches topically daily Remove & Discard patch within 12 hours or as directed by MD Do not start before December 20, 2022.   Patient taking differently: Apply 2 patches topically daily Remove & Discard patch within 12 hours or as directed by MD   magnesium Oxide (MAG-OX) 400 mg TABS   No No   Sig: Take 1 tablet (400 mg total) by mouth 2 (two) times a day   metoprolol tartrate  (LOPRESSOR) 25 mg tablet  Outside Facility (Specify), Self, Family Member No No   Sig: Take 1 tablet (25 mg total) by mouth every 12 (twelve) hours   miconazole 2 % cream  Self, Outside Facility (Specify), Family Member No No   Sig: Apply topically 2 (two) times a day   nystatin (MYCOSTATIN) powder  Outside Facility (Specify), Self, Family Member No No   Sig: Apply topically 2 (two) times a day   pantoprazole (PROTONIX) 40 mg tablet  Self, Outside Facility (Specify), Family Member No No   Sig: Take 1 tablet (40 mg total) by mouth daily in the early morning   predniSONE 5 mg tablet  Self, Outside Facility (Specify), Family Member No No   Sig: Take 1 tablet (5 mg total) by mouth 2 (two) times a day with meals Do not start before 2023.   triamcinolone (KENALOG) 0.1 % cream  Self, Outside Facility (Specify), Family Member Yes No   Sig: Apply topically 2 (two) times a day Apply to BUE and BLE topically everyday and evening shift for psoriasis      Facility-Administered Medications: None       Past Medical History:   Diagnosis Date    Abnormal thyroid function test     last assessed: 2015     Arthritis     Caries     last assessed: 2016     Continuous opioid dependence (HCC) 2021    Edema of right lower extremity     last assessed: 2015     GERD (gastroesophageal reflux disease)     Hypertension     Medicare annual wellness visit, subsequent 2021    Positive blood culture 3/11/2022    Sarcoid        Past Surgical History:   Procedure Laterality Date     SECTION      IR NON-TUNNELED CENTRAL LINE PLACEMENT  2024    IR PICC PLACEMENT SINGLE LUMEN  2024    IR PICC PLACEMENT SINGLE LUMEN  2024    MULTIPLE TOOTH EXTRACTIONS N/A 2016    Procedure: Surgical extraction of teeth 2, 18, 19, 30, 31; incision and drainage of left subperiosteal abscess ;  Surgeon: Clara Cevallos DMD;  Location: [facility];  Service:        Family History   Problem Relation Age  of Onset    Asthma Mother     Stroke Mother     No Known Problems Father     No Known Problems Sister     No Known Problems Brother     No Known Problems Maternal Grandmother     No Known Problems Maternal Grandfather     No Known Problems Paternal Grandmother     No Known Problems Paternal Grandfather     Diabetes Maternal Aunt     Breast cancer Cousin     Diabetes Cousin     Breast cancer Other      I have reviewed and agree with the history as documented.    E-Cigarette/Vaping    E-Cigarette Use Never User      E-Cigarette/Vaping Substances    Nicotine No     THC No     CBD No     Flavoring No     Other No     Unknown No      Social History     Tobacco Use    Smoking status: Never    Smokeless tobacco: Never   Vaping Use    Vaping status: Never Used   Substance Use Topics    Alcohol use: Not Currently    Drug use: No       Review of Systems   Unable to perform ROS: Mental status change       Physical Exam  Physical Exam  Vitals and nursing note reviewed.   Constitutional:       Appearance: She is obese. She is ill-appearing. She is not diaphoretic.      Comments: Waxing and waning drowsiness.  Responds to some questions and some commands.   HENT:      Head: Normocephalic and atraumatic.   Eyes:      Pupils: Pupils are equal, round, and reactive to light.   Cardiovascular:      Rate and Rhythm: Tachycardia present. Rhythm irregular.      Heart sounds: Normal heart sounds.   Pulmonary:      Effort: Pulmonary effort is normal.      Comments: Diminished breath sounds.  Abdominal:      General: There is no distension.      Palpations: Abdomen is soft.      Tenderness: There is no abdominal tenderness.   Musculoskeletal:      Cervical back: Normal range of motion and neck supple.      Comments: Swelling and significant tenderness of the left forearm and hand.   Skin:     General: Skin is warm and dry.      Capillary Refill: Capillary refill takes less than 2 seconds.      Comments: Very dry, flaky skin.   Neurological:       Comments: Oriented to person, place.  Generalized muscular weakness at baseline.         Vital Signs  ED Triage Vitals   Temperature Pulse Respirations Blood Pressure SpO2   08/02/24 1712 08/02/24 1712 08/02/24 1712 08/02/24 1712 08/02/24 1715   97.7 °F (36.5 °C) (!) 108 19 106/64 100 %      Temp Source Heart Rate Source Patient Position - Orthostatic VS BP Location FiO2 (%)   08/02/24 1712 08/02/24 1712 08/02/24 1712 08/02/24 1712 --   Oral Monitor Lying Right arm       Pain Score       08/02/24 1712       No Pain           Vitals:    08/02/24 1730 08/02/24 1947 08/02/24 2334 08/03/24 0030   BP: 128/92 117/59 91/51 102/55   Pulse: (!) 110 (!) 106 (!) 117 104   Patient Position - Orthostatic VS: Lying Lying Lying Sitting         Visual Acuity      ED Medications  Medications   metoprolol tartrate (LOPRESSOR) tablet 25 mg (25 mg Oral Not Given 8/2/24 2318)   apixaban (ELIQUIS) tablet 5 mg (5 mg Oral Not Given 8/2/24 2315)   doxycycline hyclate (VIBRAMYCIN) capsule 100 mg (100 mg Oral Not Given 8/2/24 2316)   magnesium Oxide (MAG-OX) tablet 400 mg (400 mg Oral Not Given 8/2/24 2316)   metoprolol (LOPRESSOR) injection 5 mg (0 mg Intravenous Hold 8/2/24 2359)   metroNIDAZOLE (FLAGYL) IVPB (premix) 500 mg 100 mL (has no administration in time range)   multi-electrolyte (ISOLYTE-S PH 7.4) bolus 500 mL (500 mL Intravenous New Bag 8/2/24 2354)   iohexol (OMNIPAQUE) 350 MG/ML injection (MULTI-DOSE) 68 mL (68 mL Intravenous Given 8/2/24 2128)   levalbuterol (XOPENEX) inhalation solution 1.25 mg (1.25 mg Nebulization Given 8/2/24 2247)   acetaminophen (Ofirmev) injection 1,000 mg (0 mg Intravenous Stopped 8/3/24 0000)   ceftriaxone (ROCEPHIN) 1 g/50 mL in dextrose IVPB (0 mg Intravenous Stopped 8/3/24 0039)       Diagnostic Studies  Results Reviewed       Procedure Component Value Units Date/Time    HS Troponin I 4hr [[id number]]  (Abnormal) Collected: 08/02/24 2152    Lab Status: Final result Specimen: Blood from Central  Venous Line Updated: 08/03/24 0006     hs TnI 4hr 633 ng/L      Delta 4hr hsTnI 111 ng/L     HS Troponin I 2hr [[id number]]  (Abnormal) Collected: 08/02/24 2141    Lab Status: Final result Specimen: Blood from Central Venous Line Updated: 08/02/24 2211     hs TnI 2hr 512 ng/L      Delta 2hr hsTnI -10 ng/L     D-Dimer [[id number]]  (Abnormal) Collected: 08/02/24 2033    Lab Status: Final result Specimen: Blood from Central Venous Line Updated: 08/02/24 2059     D-Dimer, Quant 1.71 ug/ml FEU     Narrative:      In the evaluation for possible pulmonary embolism, in the appropriate (Well's Score of 4 or less) patient, the age adjusted d-dimer cutoff for this patient can be calculated as:    Age x 0.01 (in ug/mL) for Age-adjusted D-dimer exclusion threshold for a patient over 50 years.    Protime-INR [[id number]]  (Abnormal) Collected: 08/02/24 2033    Lab Status: Final result Specimen: Blood from Central Venous Line Updated: 08/02/24 2057     Protime 23.3 seconds      INR 2.08    Narrative:      INR Therapeutic Range    Indication                                             INR Range      Atrial Fibrillation                                               2.0-3.0  Hypercoagulable State                                    2.0.2.3  Left Ventricular Asist Device                            2.0-3.0  Mechanical Heart Valve                                  -    Aortic(with afib, MI, embolism, HF, LA enlargement,    and/or coagulopathy)                                     2.0-3.0 (2.5-3.5)     Mitral                                                             2.5-3.5  Prosthetic/Bioprosthetic Heart Valve               2.0-3.0  Venous thromboembolism (VTE: VT, PE        2.0-3.0    APTT [[id number]]  (Normal) Collected: 08/02/24 2033    Lab Status: Final result Specimen: Blood from Central Venous Line Updated: 08/02/24 2057     PTT 34 seconds     Basic metabolic panel [[id number]]  (Abnormal) Collected: 08/02/24 1919    Lab Status: Final  result Specimen: Blood from Central Venous Line Updated: 08/02/24 2018     Sodium 147 mmol/L      Potassium 3.9 mmol/L      Chloride 110 mmol/L      CO2 24 mmol/L      ANION GAP 13 mmol/L      BUN 28 mg/dL      Creatinine 1.61 mg/dL      Glucose 75 mg/dL      Calcium 9.2 mg/dL      eGFR 30 ml/min/1.73sq m     Narrative:      National Kidney Disease Foundation guidelines for Chronic Kidney Disease (CKD):     Stage 1 with normal or high GFR (GFR > 90 mL/min/1.73 square meters)    Stage 2 Mild CKD (GFR = 60-89 mL/min/1.73 square meters)    Stage 3A Moderate CKD (GFR = 45-59 mL/min/1.73 square meters)    Stage 3B Moderate CKD (GFR = 30-44 mL/min/1.73 square meters)    Stage 4 Severe CKD (GFR = 15-29 mL/min/1.73 square meters)    Stage 5 End Stage CKD (GFR <15 mL/min/1.73 square meters)  Note: GFR calculation is accurate only with a steady state creatinine    Magnesium [[id number]]  (Abnormal) Collected: 08/02/24 1919    Lab Status: Final result Specimen: Blood from Central Venous Line Updated: 08/02/24 2018     Magnesium 1.6 mg/dL     B-Type Natriuretic Peptide(BNP) [[id number]]  (Abnormal) Collected: 08/02/24 1919    Lab Status: Final result Specimen: Blood from Central Venous Line Updated: 08/02/24 2018      pg/mL     HS Troponin 0hr (reflex protocol) [[id number]]  (Abnormal) Collected: 08/02/24 1919    Lab Status: Final result Specimen: Blood from Central Venous Line Updated: 08/02/24 2017     hs TnI 0hr 522 ng/L     CBC and differential [[id number]]  (Abnormal) Collected: 08/02/24 1919    Lab Status: Final result Specimen: Blood from Central Venous Line Updated: 08/02/24 1958     WBC 8.44 Thousand/uL      RBC 3.23 Million/uL      Hemoglobin 8.9 g/dL      Hematocrit 29.2 %      MCV 90 fL      MCH 27.6 pg      MCHC 30.5 g/dL      RDW 18.0 %      Platelets 150 Thousands/uL      nRBC 1 /100 WBCs      Segmented % 81 %      Immature Grans % 1 %      Lymphocytes % 13 %      Monocytes % 4 %      Eosinophils Relative 1 %       Basophils Relative 0 %      Absolute Neutrophils 6.80 Thousands/µL      Absolute Immature Grans 0.06 Thousand/uL      Absolute Lymphocytes 1.10 Thousands/µL      Absolute Monocytes 0.37 Thousand/µL      Eosinophils Absolute 0.09 Thousand/µL      Basophils Absolute 0.02 Thousands/µL     Blood gas, venous [[id number]]  (Abnormal) Collected: 08/02/24 1919    Lab Status: Final result Specimen: Blood from Central Venous Line Updated: 08/02/24 1954     pH, Kevin 7.418     pCO2, Kevin 33.3 mm Hg      pO2, Kevni 67.9 mm Hg      HCO3, Kevin 21.0 mmol/L      Base Excess, Kevin -2.9 mmol/L      O2 Content, Kevin 12.5 ml/dL      O2 HGB, VENOUS 89.3 %                    CT head without contrast   Final Result by Merritt Lazo MD (08/02 2334)      No acute findings.      Findings are consistent with the preliminary report from [facility] which was provided shortly after completion of the exam.                     Workstation performed: [id number]         CTA ED chest PE study   Final Result by Merritt Lazo MD (08/02 2338)      Mild pulmonary edema with small bibasilar pleural effusions and cardiomegaly in keeping with CHF.      The major findings are in agreement with the preliminary report provided by [facility] which was provided shortly after completion of the exam.                     Workstation performed: [id number]         XR chest 1 view portable   ED Interpretation by Neel Munoz MD (08/02 1950)   Right internal jugular central line terminating in the heart.  Chronic right upper lobe opacity.      Final Result by Lorena John MD (08/02 2208)      No acute cardiopulmonary disease.      Mild benign bilateral linear scar.      Small pleural effusions better shown on subsequent chest CT.      Workstation performed: [id number]                    Procedures  ECG 12 Lead Documentation Only    Date/Time: 8/2/2024 5:52 PM    Performed by: Neel Munoz MD  Authorized by:  Neel Munoz MD    Indications / Diagnosis:  Altered  ECG reviewed by me, the ED Provider: yes    Patient location:  ED  Previous ECG:     Previous ECG:  Compared to current    Comparison ECG info:  7/21/24    Similarity:  Changes noted  Rate:     ECG rate:  112  Rhythm:     Rhythm: atrial fibrillation    Ectopy:     Ectopy: none    QRS:     QRS axis:  Normal  Conduction:     Conduction: normal    ST segments:     ST segments:  Normal  T waves:     T waves: inverted      Inverted:  III, V4, V5 and V6  Q waves:     Q waves:  III  Central Line    Date/Time: 8/2/2024 7:13 PM    Performed by: Neel Munoz MD  Authorized by: Neel Munoz MD    Patient location:  ED  Consent:     Consent obtained:  Verbal    Consent given by:  Healthcare agent  Universal protocol:     Patient identity confirmed:  Verbally with patient  Pre-procedure details:     Hand hygiene: Hand hygiene performed prior to insertion      Sterile barrier technique: All elements of maximal sterile technique followed      Skin preparation:  2% chlorhexidine    Skin preparation agent: Skin preparation agent completely dried prior to procedure    Indications:     Central line indications: no peripheral vascular access    Anesthesia (see MAR for exact dosages):     Anesthesia method:  Local infiltration    Local anesthetic:  Lidocaine 1% w/o epi  Procedure details:     Location:  Right internal jugular    Vessel type: vein      Laterality:  Right    Approach: percutaneous technique used      Patient position:  Trendelenburg    Catheter type:  Triple lumen 16cm    Catheter size:  7 Fr    Landmarks identified: yes      Ultrasound guidance: yes      Ultrasound image availability:  Not saved    Sterile ultrasound techniques: Sterile gel and sterile probe covers were used      Number of attempts:  2    Successful placement: yes      Catheter tip vessel location: superior vena cava    Post-procedure details:     Post-procedure:  Dressing applied     Assessment:  Blood return through all ports, free fluid flow and placement verified by x-ray    Post-procedure complications: none      Patient tolerance of procedure:  Tolerated well, no immediate complications    Observer: Yes      Observer name:  RN  CriticalCare Time    Date/Time: 8/3/2024 12:49 AM    Performed by: Neel Munoz MD  Authorized by: Neel Munoz MD    Critical care provider statement:     Critical care time (minutes):  45    Critical care time was exclusive of:  Separately billable procedures and treating other patients    Critical care was necessary to treat or prevent imminent or life-threatening deterioration of the following conditions:  Respiratory failure and CNS failure or compromise    Critical care was time spent personally by me on the following activities:  Obtaining history from patient or surrogate, development of treatment plan with patient or surrogate, discussions with consultants, examination of patient, evaluation of patient's response to treatment, ordering and performing treatments and interventions, ordering and review of laboratory studies, ordering and review of radiographic studies, re-evaluation of patient's condition, review of old charts and blood draw for specimens           ED Course  ED Course as of 08/03/24 0050   Fri Aug 02, 2024   1812 Discussed plan of care with patient's daughter Rhoda.  Agreeable to central venous access as well as all life-saving measures.   2036 BUN(!): 28   2036 Creatinine(!): 1.61  Slightly elevated from this morning.   2037 BNP(!): 454  Mildly elevated from last month.   2037 hs TnI 0hr(!): 522  Down from 1300 twelve days ago.  Will continue to trend.   2037 Hemoglobin(!): 8.9  Stable anemia.   2217 hs TnI 2hr(!): 512  Stable   2222 Patient now more alert.  Not hungry but will try to drink.  Still complaining of left upper extremity pain.  Down to 3 L humidified nasal cannula oxygen with 100% saturation.   2257 Per [facility], CT head  unremarkable.   2313 Patient unable to take any oral medications, unable to fully open mouth.   2335 CT chest shows:    1. No pulmonary embolism.  2. Small bilateral pleural effusions with adjacent dependent atelectasis. Superimposed infection or  aspiration is difficult to exclude in the appropriate clinical setting.  3. Scattered foci of ground-glass opacity may additionally represent underlying infectious or  inflammatory process. Recommend follow-up CT of the chest 6-8 months after appropriate therapy to  ensure resolution of these findings.  4. Right-sided nonobstructive nephrolithiasis.               HEART Risk Score      Flowsheet Row Most Recent Value   Heart Score Risk Calculator    History 0 Filed at: 08/03/2024 0008   ECG 1 Filed at: 08/03/2024 0008   Age 2 Filed at: 08/03/2024 0008   Risk Factors 1 Filed at: 08/03/2024 0008   Troponin 2 Filed at: 08/03/2024 0008   HEART Score 6 Filed at: 08/03/2024 0008                          SBIRT 22yo+      Flowsheet Row Most Recent Value   Initial Alcohol Screen: US AUDIT-C     1. How often do you have a drink containing alcohol? 0 Filed at: 08/02/2024 1712   2. How many drinks containing alcohol do you have on a typical day you are drinking?  0 Filed at: 08/02/2024 1712   3b. FEMALE Any Age, or MALE 65+: How often do you have 4 or more drinks on one occassion? 0 Filed at: 08/02/2024 1712   Audit-C Score 0 Filed at: 08/02/2024 1712   RADHA: How many times in the past year have you...    Used an illegal drug or used a prescription medication for non-medical reasons? Never Filed at: 08/02/2024 1712            Wells' Criteria for PE      Flowsheet Row Most Recent Value   Wells' Criteria for PE    Clinical signs and symptoms of DVT 3 Filed at: 08/02/2024 1952   PE is primary diagnosis or equally likely 3 Filed at: 08/02/2024 1952   HR >100 1.5 Filed at: 08/02/2024 1952   Immobilization at least 3 days or Surgery in the previous 4 weeks 1.5 Filed at: 08/02/2024 1952    Previous, objectively diagnosed PE or DVT 0 Filed at: 08/02/2024 1952   Hemoptysis 0 Filed at: 08/02/2024 1952   Malignancy with treatment within 6 months or palliative 0 Filed at: 08/02/2024 1952   Wells' Criteria Total 9 Filed at: 08/02/2024 1952                  Medical Decision Making  Amount and/or Complexity of Data Reviewed  Labs: ordered. Decision-making details documented in ED Course.  Radiology: ordered and independent interpretation performed.    Risk  OTC drugs.  Prescription drug management.  Decision regarding hospitalization.                 Disposition  Final diagnoses:   Acute encephalopathy   Hypoxia   Bilateral pleural effusion   Aspiration pneumonia (HCC)     Time reflects when diagnosis was documented in both MDM as applicable and the Disposition within this note       Time User Action Codes Description Comment    8/2/2024 10:02 PM Neel Munoz [G93.40] Acute encephalopathy     8/2/2024 10:02 PM eNel Munoz [R09.02] Hypoxia     8/2/2024 10:02 PM Neel Munoz [J90] Bilateral pleural effusion     8/3/2024 12:09 AM Neel Munoz [J69.0] Aspiration pneumonia (HCC)           ED Disposition       ED Disposition   Admit    Condition   Stable    Date/Time   Sat Aug 3, 2024 0049    Comment   Case was discussed with Cathi and the patient's admission status was agreed to be Admission Status: inpatient status to the service of Dr. Fisher.               Follow-up Information    None         Patient's Medications   Discharge Prescriptions    No medications on file       No discharge procedures on file.    PDMP Review         Value Time User    PDMP Reviewed  Yes 5/26/2023 11:45 PM Kb Sethi DO            ED Provider  Electronically Signed by             Neel Munoz MD  08/03/24 0050

## 2024-08-02 NOTE — ASSESSMENT & PLAN NOTE
Patient is bed bound, wheelchair bound and she is vamshi lift at [facility] at baseline.  No further PT needs.

## 2024-08-02 NOTE — CASE MANAGEMENT
Case Management Discharge Planning Note    Patient name Bhavna Al  [facility] /[facility] -* MRN [id number]  : 1950 Date 2024       Current Admission Date: 2024  Current Admission Diagnosis:encephalopathy   Patient Active Problem List    Diagnosis Date Noted Date Diagnosed    Pain and swelling of left upper extremity 2024     Goals of care, counseling/discussion 2024     encephalopathy 2024     Elevated troponin 2024     Sepsis (HCC) 2024     Acute kidney injury superimposed on chronic kidney disease  ([facility]) 2024     Hypokalemia 2024     Chronic pruritic rash in adult 2024     Secondary adrenal insufficiency ([facility]) 2024     Pressure ulcer of left buttock, stage 3 ([facility]) 2024     Acute osteomyelitis of thoracic spine ([facility]) 2024     MRSA bacteremia 2024     Hypotension 2024     Dysphagia 2024     Deep tissue injury 2024     Hypernatremia 2024     Localized swelling on left hand 2023     Stage 3 chronic kidney disease ([facility]) 2023     Febrile illness 2023     Dermatitis associated with incontinence 2023     Right shoulder pain 12/15/2022     Abnormal urinalysis 2022     Ambulatory dysfunction 2022     Hyperuricemia 2022     Acute metabolic encephalopathy 2022     Acute on chronic diastolic heart failure, LVEF 65%, LVIDd 3.3 cm, AHA Stage C 2022     Acute bronchitis 2022     Assistance needed with transportation 09/15/2022     Abnormal CT of the chest 2022     Gram-positive cocci bacteremia 2022     Psoriasis 2022     COVID-19 2022     Shock ([facility]) 01/10/2022     Persistent atrial fibrillation 01/10/2022     Hyperparathyroidism ([facility]) 2021     Murmur, cardiac 2021     BPPV (benign paroxysmal positional vertigo) 2018     Seasonal allergic rhinitis due to pollen 2018     Staphylococcal scalded skin  syndrome 10/27/2017     Osteoporosis 12/05/2016     Leukopenia 08/23/2016     Rheumatoid arthritis (HCC) 08/23/2016     GERD (gastroesophageal reflux disease) 08/23/2016     Sarcoid 08/23/2016     Morbid obesity (HCC) 07/12/2016     Vitamin D deficiency 07/16/2015     Essential hypertension 12/04/2014     Lumbar radiculopathy 12/04/2014       LOS (days): 12  Geometric Mean LOS (GMLOS) (days): 5.1  Days to GMLOS:-6.8     OBJECTIVE:  Risk of Unplanned Readmission Score: 37.02         Current admission status: Inpatient   Preferred Pharmacy:   [id number] - [place], [place] - [address]  [address]  [place] [address]  Phone: [phone] Fax: [phone]    [facility] - [place], [place] - [address]  [address] [address]  Phone: [phone] Fax: [phone]    Primary Care Provider: Nya Taveras DO    Primary Insurance: MEDICARE  Secondary Insurance: [facility]    DISCHARGE DETAILS:    Discharge planning discussed with:: Patient and Granddaughter  Freedom of Choice: Yes  Comments - Freedom of Choice: Return to [facility] contacted family/caregiver?: Yes  Were Treatment Team discharge recommendations reviewed with patient/caregiver?: Yes  Did patient/caregiver verbalize understanding of patient care needs?: N/A- going to facility  Were patient/caregiver advised of the risks associated with not following Treatment Team discharge recommendations?: Yes    Contacts  Patient Contacts: Bhavna Granddaughter  Relationship to Patient:: Family  Contact Method: In Person  Reason/Outcome: Discharge Planning, Continuity of Care, Emergency Contact    Requested Home Health Care         Is the patient interested in HHC at discharge?: No    DME Referral Provided  Referral made for DME?: No    Other Referral/Resources/Interventions Provided:  Interventions: Facility Return  [facility] Comments[facility] [place] [facility]    Would you like to  participate in our [facility] service program?  : No - Declined    Treatment Team Recommendation: SNF  Discharge Destination Plan:: SNF  Transport at Discharge : BLS Ambulance  Dispatcher Contacted: Yes  Number/Name of Dispatcher: Contacted via [employer]  Transported by (Company and Unit #): PO  ETA of Transport (Date): 08/02/24  ETA of Transport (Time): 1200              CM notified by SLIM patient is medically stable for DC back to facility today.    CM requested BLS transportation via [employer].  PO claimed the ride for 8.2.2024 at 12:00pm.  CM notified SLIM, RN, and facility.  CM placed medical necessity form in patient's chart.    CM met with patient and patient's granddaughter at bedside, they are in agreement with DC back to [facility] today 8.2.2024.  CM arranged 12:00pm transportation back to facility.

## 2024-08-02 NOTE — PLAN OF CARE
Problem: Prexisting or High Potential for Compromised Skin Integrity  Goal: Skin integrity is maintained or improved  Description: INTERVENTIONS:  - Identify patients at risk for skin breakdown  - Assess and monitor skin integrity  - Assess and monitor nutrition and hydration status  - Monitor labs   - Assess for incontinence   - Turn and reposition patient  - Assist with mobility/ambulation  - Relieve pressure over bony prominences  - Avoid friction and shearing  - Provide appropriate hygiene as needed including keeping skin clean and dry  - Evaluate need for skin moisturizer/barrier cream  - Collaborate with interdisciplinary team   - Patient/family teaching  - Consider wound care consult   Outcome: Adequate for Discharge     Problem: PAIN - ADULT  Goal: Verbalizes/displays adequate comfort level or baseline comfort level  Description: Interventions:  - Encourage patient to monitor pain and request assistance  - Assess pain using appropriate pain scale  - Administer analgesics based on type and severity of pain and evaluate response  - Implement non-pharmacological measures as appropriate and evaluate response  - Consider cultural and social influences on pain and pain management  - Notify physician/advanced practitioner if interventions unsuccessful or patient reports new pain  Outcome: Adequate for Discharge     Problem: INFECTION - ADULT  Goal: Absence or prevention of progression during hospitalization  Description: INTERVENTIONS:  - Assess and monitor for signs and symptoms of infection  - Monitor lab/diagnostic results  - Monitor all insertion sites, i.e. indwelling lines, tubes, and drains  - Monitor endotracheal if appropriate and nasal secretions for changes in amount and color  - [place] appropriate cooling/warming therapies per order  - Administer medications as ordered  - Instruct and encourage patient and family to use good hand hygiene technique  - Identify and instruct in appropriate isolation  precautions for identified infection/condition  Outcome: Adequate for Discharge     Problem: SAFETY ADULT  Goal: Patient will remain free of falls  Description: INTERVENTIONS:  - Educate patient/family on patient safety including physical limitations  - Instruct patient to call for assistance with activity   - Consult OT/PT to assist with strengthening/mobility   - Keep Call bell within reach  - Keep bed low and locked with side rails adjusted as appropriate  - Keep care items and personal belongings within reach  - Initiate and maintain comfort rounds  - Make Fall Risk Sign visible to staff  - Apply yellow socks and bracelet for high fall risk patients  - Consider moving patient to room near nurses station  Outcome: Adequate for Discharge  Goal: Maintain or return to baseline ADL function  Description: INTERVENTIONS:  -  Assess patient's ability to carry out ADLs; assess patient's baseline for ADL function and identify physical deficits which impact ability to perform ADLs (bathing, care of mouth/teeth, toileting, grooming, dressing, etc.)  - Assess/evaluate cause of self-care deficits   - Assess range of motion  - Assess patient's mobility; develop plan if impaired  - Assess patient's need for assistive devices and provide as appropriate  - Encourage maximum independence but intervene and supervise when necessary  - Involve family in performance of ADLs  - Assess for home care needs following discharge   - Consider OT consult to assist with ADL evaluation and planning for discharge  - Provide patient education as appropriate  Outcome: Adequate for Discharge  Goal: Maintains/Returns to pre admission functional level  Description: INTERVENTIONS:  - Perform AM-PAC 6 Click Basic Mobility/ Daily Activity assessment daily.  - Set and communicate daily mobility goal to care team and patient/family/caregiver.   - Collaborate with rehabilitation services on mobility goals if consulted  - Out of bed for toileting  - Record  patient progress and toleration of activity level   Outcome: Adequate for Discharge     Problem: DISCHARGE PLANNING  Goal: Discharge to home or other facility with appropriate resources  Description: INTERVENTIONS:  - Identify barriers to discharge w/patient and caregiver  - Arrange for needed discharge resources and transportation as appropriate  - Identify discharge learning needs (meds, wound care, etc.)  - Arrange for interpretive services to assist at discharge as needed  - Refer to Case Management Department for coordinating discharge planning if the patient needs post-hospital services based on physician/advanced practitioner order or complex needs related to functional status, cognitive ability, or social support system  Outcome: Adequate for Discharge     Problem: Knowledge Deficit  Goal: Patient/family/caregiver demonstrates understanding of disease process, treatment plan, medications, and discharge instructions  Description: Complete learning assessment and assess knowledge base.  Interventions:  - Provide teaching at level of understanding  - Provide teaching via preferred learning methods  Outcome: Adequate for Discharge     Problem: CARDIOVASCULAR - ADULT  Goal: Maintains optimal cardiac output and hemodynamic stability  Description: INTERVENTIONS:  - Monitor I/O, vital signs and rhythm  - Monitor for S/S and trends of decreased cardiac output  - Administer and titrate ordered vasoactive medications to optimize hemodynamic stability  - Assess quality of pulses, skin color and temperature  - Assess for signs of decreased coronary artery perfusion  - Instruct patient to report change in severity of symptoms  Outcome: Adequate for Discharge  Goal: Absence of cardiac dysrhythmias or at baseline rhythm  Description: INTERVENTIONS:  - Continuous cardiac monitoring, vital signs, obtain 12 lead EKG if ordered  - Administer antiarrhythmic and heart rate control medications as ordered  - Monitor electrolytes  and administer replacement therapy as ordered  Outcome: Adequate for Discharge     Problem: RESPIRATORY - ADULT  Goal: Achieves optimal ventilation and oxygenation  Description: INTERVENTIONS:  - Assess for changes in respiratory status  - Assess for changes in mentation and behavior  - Position to facilitate oxygenation and minimize respiratory effort  - Oxygen administered by appropriate delivery if ordered  - Initiate smoking cessation education as indicated  - Encourage broncho-pulmonary hygiene including cough, deep breathe, Incentive Spirometry  - Assess the need for suctioning and aspirate as needed  - Assess and instruct to report SOB or any respiratory difficulty  - Respiratory Therapy support as indicated  Outcome: Adequate for Discharge     Problem: METABOLIC, FLUID AND ELECTROLYTES - ADULT  Goal: Electrolytes maintained within normal limits  Description: INTERVENTIONS:  - Monitor labs and assess patient for signs and symptoms of electrolyte imbalances  - Administer electrolyte replacement as ordered  - Monitor response to electrolyte replacements, including repeat lab results as appropriate  - Instruct patient on fluid and nutrition as appropriate  Outcome: Adequate for Discharge  Goal: Fluid balance maintained  Description: INTERVENTIONS:  - Monitor labs   - Monitor I/O and WT  - Instruct patient on fluid and nutrition as appropriate  - Assess for signs & symptoms of volume excess or deficit  Outcome: Adequate for Discharge  Goal: Glucose maintained within target range  Description: INTERVENTIONS:  - Monitor Blood Glucose as ordered  - Assess for signs and symptoms of hyperglycemia and hypoglycemia  - Administer ordered medications to maintain glucose within target range  - Assess nutritional intake and initiate nutrition service referral as needed  Outcome: Adequate for Discharge     Problem: HEMATOLOGIC - ADULT  Goal: Maintains hematologic stability  Description: INTERVENTIONS  - Assess for signs and  symptoms of bleeding or hemorrhage  - Monitor labs  - Administer supportive blood products/factors as ordered and appropriate  Outcome: Adequate for Discharge     Problem: Nutrition/Hydration-ADULT  Goal: Nutrient/Hydration intake appropriate for improving, restoring or maintaining nutritional needs  Description: Monitor and assess patient's nutrition/hydration status for malnutrition. Collaborate with interdisciplinary team and initiate plan and interventions as ordered.  Monitor patient's weight and dietary intake as ordered or per policy. Utilize nutrition screening tool and intervene as necessary. Determine patient's food preferences and provide high-protein, high-caloric foods as appropriate.     INTERVENTIONS:  - Monitor oral intake, urinary output, labs, and treatment plans  - Assess nutrition and hydration status and recommend course of action  - Evaluate amount of meals eaten  - Assist patient with eating if necessary   - Allow adequate time for meals  - Recommend/ encourage appropriate diets, oral nutritional supplements, and vitamin/mineral supplements  - Order, calculate, and assess calorie counts as needed  - Recommend, monitor, and adjust tube feedings and TPN/PPN based on assessed needs  - Assess need for intravenous fluids  - Provide specific nutrition/hydration education as appropriate  - Include patient/family/caregiver in decisions related to nutrition  Outcome: Adequate for Discharge

## 2024-08-02 NOTE — NURSING NOTE
Attempted to call report to SNF x2 but unable to reach the nurse. Unable to leave VM. Discharge instructions sent with patient/EMS transport team to facility.

## 2024-08-02 NOTE — ED NOTES
Multiple US IV attempts by RN unsuccessful at this time. Provider made aware.     Janet Jalloh RN  08/02/24 [id number]

## 2024-08-03 PROBLEM — D64.9 CHRONIC ANEMIA: Chronic | Status: ACTIVE | Noted: 2024-08-03

## 2024-08-03 PROBLEM — J96.01 ACUTE RESPIRATORY FAILURE WITH HYPOXIA (HCC): Status: ACTIVE | Noted: 2024-08-03

## 2024-08-03 PROBLEM — E83.42 HYPOMAGNESEMIA: Status: ACTIVE | Noted: 2024-08-03

## 2024-08-03 LAB
4HR DELTA HS TROPONIN: 111 NG/L
ATRIAL RATE: 258 BPM
BASOPHILS # BLD AUTO: 0.03 THOUSANDS/ÂΜL (ref 0–0.1)
BASOPHILS NFR BLD AUTO: 1 % (ref 0–1)
CARDIAC TROPONIN I PNL SERPL HS: 633 NG/L
EOSINOPHIL # BLD AUTO: 0.06 THOUSAND/ÂΜL (ref 0–0.61)
EOSINOPHIL NFR BLD AUTO: 1 % (ref 0–6)
ERYTHROCYTE [DISTWIDTH] IN BLOOD BY AUTOMATED COUNT: 18.6 % (ref 11.6–15.1)
HCT VFR BLD AUTO: 34.2 % (ref 34.8–46.1)
HGB BLD-MCNC: 10.7 G/DL (ref 11.5–15.4)
IMM GRANULOCYTES # BLD AUTO: 0.03 THOUSAND/UL (ref 0–0.2)
IMM GRANULOCYTES NFR BLD AUTO: 1 % (ref 0–2)
LYMPHOCYTES # BLD AUTO: 0.7 THOUSANDS/ÂΜL (ref 0.6–4.47)
LYMPHOCYTES NFR BLD AUTO: 14 % (ref 14–44)
MCH RBC QN AUTO: 28.4 PG (ref 26.8–34.3)
MCHC RBC AUTO-ENTMCNC: 31.3 G/DL (ref 31.4–37.4)
MCV RBC AUTO: 91 FL (ref 82–98)
MONOCYTES # BLD AUTO: 0.25 THOUSAND/ÂΜL (ref 0.17–1.22)
MONOCYTES NFR BLD AUTO: 5 % (ref 4–12)
NEUTROPHILS # BLD AUTO: 3.83 THOUSANDS/ÂΜL (ref 1.85–7.62)
NEUTS SEG NFR BLD AUTO: 78 % (ref 43–75)
NRBC BLD AUTO-RTO: 1 /100 WBCS
PLATELET # BLD AUTO: 80 THOUSANDS/UL (ref 149–390)
QRS AXIS: 33 DEGREES
QRSD INTERVAL: 84 MS
QT INTERVAL: 334 MS
QTC INTERVAL: 455 MS
RBC # BLD AUTO: 3.77 MILLION/UL (ref 3.81–5.12)
T WAVE AXIS: -40 DEGREES
VENTRICULAR RATE: 112 BPM
WBC # BLD AUTO: 4.9 THOUSAND/UL (ref 4.31–10.16)

## 2024-08-03 PROCEDURE — 99223 1ST HOSP IP/OBS HIGH 75: CPT | Performed by: STUDENT IN AN ORGANIZED HEALTH CARE EDUCATION/TRAINING PROGRAM

## 2024-08-03 PROCEDURE — 85025 COMPLETE CBC W/AUTO DIFF WBC: CPT | Performed by: NURSE PRACTITIONER

## 2024-08-03 PROCEDURE — 93010 ELECTROCARDIOGRAM REPORT: CPT | Performed by: INTERNAL MEDICINE

## 2024-08-03 PROCEDURE — 99291 CRITICAL CARE FIRST HOUR: CPT | Performed by: EMERGENCY MEDICINE

## 2024-08-03 RX ORDER — BISACODYL 10 MG
10 SUPPOSITORY, RECTAL RECTAL DAILY PRN
Status: DISCONTINUED | OUTPATIENT
Start: 2024-08-03 | End: 2024-08-08 | Stop reason: HOSPADM

## 2024-08-03 RX ORDER — FUROSEMIDE 20 MG/1
20 TABLET ORAL DAILY
Status: DISCONTINUED | OUTPATIENT
Start: 2024-08-03 | End: 2024-08-04

## 2024-08-03 RX ORDER — MAGNESIUM HYDROXIDE/ALUMINUM HYDROXICE/SIMETHICONE 120; 1200; 1200 MG/30ML; MG/30ML; MG/30ML
30 SUSPENSION ORAL EVERY 6 HOURS PRN
Status: DISCONTINUED | OUTPATIENT
Start: 2024-08-03 | End: 2024-08-03

## 2024-08-03 RX ORDER — PANTOPRAZOLE SODIUM 40 MG/1
40 TABLET, DELAYED RELEASE ORAL
Status: DISCONTINUED | OUTPATIENT
Start: 2024-08-03 | End: 2024-08-04

## 2024-08-03 RX ORDER — FUROSEMIDE 10 MG/ML
40 INJECTION INTRAMUSCULAR; INTRAVENOUS ONCE
Status: COMPLETED | OUTPATIENT
Start: 2024-08-03 | End: 2024-08-03

## 2024-08-03 RX ORDER — OXYCODONE HYDROCHLORIDE 5 MG/1
5 TABLET ORAL EVERY 6 HOURS PRN
Status: DISCONTINUED | OUTPATIENT
Start: 2024-08-03 | End: 2024-08-08

## 2024-08-03 RX ORDER — ACETAMINOPHEN 325 MG/1
975 TABLET ORAL EVERY 6 HOURS PRN
Status: DISCONTINUED | OUTPATIENT
Start: 2024-08-03 | End: 2024-08-08

## 2024-08-03 RX ORDER — POLYETHYLENE GLYCOL 3350 17 G/17G
17 POWDER, FOR SOLUTION ORAL DAILY PRN
Status: DISCONTINUED | OUTPATIENT
Start: 2024-08-03 | End: 2024-08-08 | Stop reason: HOSPADM

## 2024-08-03 RX ORDER — ONDANSETRON 2 MG/ML
4 INJECTION INTRAMUSCULAR; INTRAVENOUS EVERY 6 HOURS PRN
Status: DISCONTINUED | OUTPATIENT
Start: 2024-08-03 | End: 2024-08-08 | Stop reason: HOSPADM

## 2024-08-03 RX ORDER — LANOLIN ALCOHOL/MO/W.PET/CERES
400 CREAM (GRAM) TOPICAL 2 TIMES DAILY
Status: DISCONTINUED | OUTPATIENT
Start: 2024-08-03 | End: 2024-08-07

## 2024-08-03 RX ORDER — LEVALBUTEROL INHALATION SOLUTION 1.25 MG/3ML
1.25 SOLUTION RESPIRATORY (INHALATION) EVERY 8 HOURS PRN
Status: DISCONTINUED | OUTPATIENT
Start: 2024-08-03 | End: 2024-08-08 | Stop reason: HOSPADM

## 2024-08-03 RX ORDER — DOXYCYCLINE HYCLATE 100 MG/1
100 CAPSULE ORAL 2 TIMES DAILY
Status: DISCONTINUED | OUTPATIENT
Start: 2024-08-03 | End: 2024-08-06

## 2024-08-03 RX ORDER — SIMETHICONE 80 MG
80 TABLET,CHEWABLE ORAL 4 TIMES DAILY PRN
Status: DISCONTINUED | OUTPATIENT
Start: 2024-08-03 | End: 2024-08-08 | Stop reason: HOSPADM

## 2024-08-03 RX ORDER — BISACODYL 10 MG
10 SUPPOSITORY, RECTAL RECTAL DAILY PRN
Status: DISCONTINUED | OUTPATIENT
Start: 2024-08-03 | End: 2024-08-03

## 2024-08-03 RX ORDER — PREDNISONE 5 MG/1
5 TABLET ORAL 2 TIMES DAILY WITH MEALS
Status: DISCONTINUED | OUTPATIENT
Start: 2024-08-03 | End: 2024-08-06

## 2024-08-03 RX ADMIN — METOPROLOL TARTRATE 25 MG: 25 TABLET, FILM COATED ORAL at 21:39

## 2024-08-03 RX ADMIN — DOXYCYCLINE HYCLATE 100 MG: 100 CAPSULE ORAL at 08:28

## 2024-08-03 RX ADMIN — Medication 400 MG: at 17:00

## 2024-08-03 RX ADMIN — FUROSEMIDE 20 MG: 20 TABLET ORAL at 08:28

## 2024-08-03 RX ADMIN — Medication 400 MG: at 08:28

## 2024-08-03 RX ADMIN — APIXABAN 5 MG: 5 TABLET, FILM COATED ORAL at 08:28

## 2024-08-03 RX ADMIN — METRONIDAZOLE 500 MG: 500 INJECTION, SOLUTION INTRAVENOUS at 00:55

## 2024-08-03 RX ADMIN — ONDANSETRON 4 MG: 2 INJECTION INTRAMUSCULAR; INTRAVENOUS at 05:11

## 2024-08-03 RX ADMIN — PREDNISONE 5 MG: 5 TABLET ORAL at 08:28

## 2024-08-03 RX ADMIN — FUROSEMIDE 40 MG: 10 INJECTION, SOLUTION INTRAMUSCULAR; INTRAVENOUS at 05:26

## 2024-08-03 RX ADMIN — METOPROLOL TARTRATE 25 MG: 25 TABLET, FILM COATED ORAL at 08:28

## 2024-08-03 RX ADMIN — PREDNISONE 5 MG: 5 TABLET ORAL at 16:56

## 2024-08-03 RX ADMIN — APIXABAN 5 MG: 5 TABLET, FILM COATED ORAL at 17:00

## 2024-08-03 RX ADMIN — DOXYCYCLINE HYCLATE 100 MG: 100 CAPSULE ORAL at 21:39

## 2024-08-03 NOTE — H&P
[facility]  H&P  Name: Bhavna Al 74 y.o. female I MRN: [id number]  Unit/Bed#: E4 -01 I Date of Admission: 8/2/2024   Date of Service: 8/3/2024 I Hospital Day: 0      Assessment & Plan   * Acute respiratory failure with hypoxia (HCC)  Assessment & Plan  Patient was admitted 7/21-8/02 for sepsis, AMANDA, and afib RVR. During admission she was transferred to ICU for septic shock requring pressors. She had confusion suspected to be delirium. She returned back to baseline mental status and was d/c back to [place].  Returned to ED for dyspnea and hypoxia    O2 sat 70 to 80s improved with nonrebreather.  Transitioned to mid flow  CTA neg for PE. Mild pulmonary edema with small bibasilar pleural effusions    Evaluate for home O2 prior to discharge  See plan for CHF exacerbation    Acute on chronic diastolic heart failure, LVEF 65%, LVIDd 3.3 cm, AHA Stage C  Assessment & Plan  Wt Readings from Last 3 Encounters:   08/02/24 95 kg (209 lb 7 oz)   07/11/24 108 kg (238 lb)   07/05/24 108 kg (238 lb 1.6 oz)     Preserved EF >75%. BNP >400  Presented to ED with hypoxia 70 to 80s improved with mid flow  CTA showing mild pulmonary edema with small bibasilar pleural effusions and cardiomegaly  (seen on prior imaging)  Treated for CHF exacerbation during previous admission and transitioned to oral Lasix at time of discharge. Will give 1 dose of IV furosemide 40 mg and resume home dose of Lasix 20 mg daily  Continue metoprolol 25mg BID.  Close BP monitoring  Monitor daily weight, I&O  Low Na diet with fluid restriction    Pain and swelling of left upper extremity  Assessment & Plan  Persistent left upper extremity swelling thought to be 2/2 superficial thrombophlebitis vs dependent edema vs swelling from IV line  Venous duplex 8/01 negative for DVT:  Technically difficult/limited study. The patient refused to move her arm. Unable to visualize the brachial, cephalic, and basilic veins. Unable to  visualize the forearm.  Continue supportive treatment with rest, ice and elevation  PRN Analgesics  Neurovascular checks    Elevated troponin  Assessment & Plan  Continues with troponin elevation thought to be secondary to sepsis and AMANDA  Hypoxic respiratory failure also likely contributing    Persistent atrial fibrillation  Assessment & Plan  Rate control: Metoprolol 25 mg BID  Anticoagulation: Eliquis 5mg BID    MRSA bacteremia  Assessment & Plan  On suppressive antibiotic therapy with oral doxycycline 100mg BID for vertebral osteomyelitis. Per ID: planned prolonged course ideally until inflammatory markers normalized/plateau     Acute osteomyelitis of thoracic spine (HCC)  Assessment & Plan  On suppressive antibiotic therapy with oral doxycycline for sacral osteomyelitis with MRSA     Acute metabolic encephalopathy  Assessment & Plan  Presents again with encephalopathy.  In setting of hypoxia  Recent admission for encephalopathy, was obtunded requiring transfer to ICU, thought to be secondary to worsening hypoactive delirium  CTH negative for acute intracranial abnormalities  Seems to have resolved    Hypomagnesemia  Assessment & Plan  Mg 1.6.  Replete.  Repeat a.m. level    Chronic anemia  Assessment & Plan  Hemoglobin at baseline    Dysphagia  Assessment & Plan  Continue purée with thin liquids per SLP note on 8/01  Aspiration precautions    Stage 3 chronic kidney disease (HCC)  Assessment & Plan  Lab Results   Component Value Date    EGFR 30 08/02/2024    EGFR 34 08/02/2024    EGFR 34 08/01/2024    CREATININE 1.61 (H) 08/02/2024    CREATININE 1.48 (H) 08/02/2024    CREATININE 1.46 (H) 08/01/2024     CKD 3 at baseline    Rheumatoid arthritis (HCC)  Assessment & Plan  Continue prednisone 5 mg BID  Unclear if on Plaquenil 200 mg daily ? Not listed on med rec         VTE Prophylaxis: Apixaban (Eliquis)  / sequential compression device   Code Status:   POLST: POLST is not applicable to this patient  Discussion  "with family: Daughter and granddaughters at bedside    Anticipated Length of Stay:  Patient will be admitted on an Inpatient basis with an anticipated length of stay of  > 2 midnights.   Justification for Hospital Stay: Hypoxic respiratory failure, encephalopathy, pleural effusion and mild pulmonary edema    Total Time for Visit, including Counseling / Coordination of Care: 60 minutes.  Greater than 50% of this total time spent on direct patient counseling and coordination of care.    Chief Complaint:       History of Present Illness:    Bhavna Al is a 74 y.o. female who presents with encephalopathy, dyspnea and hypoxia.  Patient was just discharged today and returned a few hours later.  Patient is a poor historian, minimally verbal.  HPI obtained by daughter at bedside who states she received a call from [facility] nursing facility stating her mother was \"pale, complaining of pain in left arm, not breathing well and confused.\"    PMHx MRSA bactermia suspected secondary to sacral osteomyelitis in setting of pressure ulcer, diastolic heart failure, A-fib on Eliquis, GERD, HTN, RA.  During admission she was treated for septic shock and hypotension requiring IV pressors.  Noted to have atrial fibrillation RVR treated with digoxin. She underwent cosyntropin stimulation testing to check for adrenal insufficiency due to chronic corticosteroid use.  She was placed back on her home dose steroid. Seen by hematology for thrombocytopenia, suspected 2/2 to bone marrow suppression from antibiotic.  Due to low UOP she was given Bumex with good response. Had some confusion which was suspected to be delirium. She returned to baseline mental status and was transferred back to facility, [place].       Review of Systems:    Review of Systems   Unable to perform ROS: Other       Past Medical and Surgical History:     Past Medical History:   Diagnosis Date    Abnormal thyroid function test     last assessed: Sept 25, 2015     Arthritis "     Caries     last assessed: 2016     Continuous opioid dependence (HCC) 2021    Edema of right lower extremity     last assessed: 2015     GERD (gastroesophageal reflux disease)     Hypertension     Medicare annual wellness visit, subsequent 2021    Positive blood culture 3/11/2022    Sarcoid        Past Surgical History:   Procedure Laterality Date     SECTION      IR NON-TUNNELED CENTRAL LINE PLACEMENT  2024    IR PICC PLACEMENT SINGLE LUMEN  2024    IR PICC PLACEMENT SINGLE LUMEN  2024    MULTIPLE TOOTH EXTRACTIONS N/A 2016    Procedure: Surgical extraction of teeth 2, 18, 19, 30, 31; incision and drainage of left subperiosteal abscess ;  Surgeon: Clara Cevallos DMD;  Location: [facility];  Service:        Meds/Allergies:    Prior to Admission medications    Medication Sig Start Date End Date Taking? Authorizing Provider   acetaminophen (TYLENOL) 325 mg tablet Take 2 tablets (650 mg total) by mouth every 4 (four) hours as needed for mild pain, headaches or fever.  Patient taking differently: Take 650 mg by mouth every 6 (six) hours as needed for mild pain, headaches or fever 16   AZIZA Hyatt   albuterol (Ventolin HFA) 90 mcg/act inhaler Inhale 2 puffs every 6 (six) hours as needed for wheezing 22   Nya Taveras DO   alendronate (FOSAMAX) 70 mg tablet Take 70 mg by mouth every 7 days Do not start before 2024. 24   Historical Provider, MD   ammonium lactate (LAC-HYDRIN) 12 % cream Apply topically 2 (two) times a day 24   Genie Abbasi MD   apixaban (ELIQUIS) 5 mg Take 1 tablet (5 mg total) by mouth 2 (two) times a day 11/10/22   Nya Taveras DO   cholecalciferol (VITAMIN D3) 1,000 units tablet Take 1 tablet (1,000 Units total) by mouth daily 11/10/22   Nya Taveras DO   clobetasol (TEMOVATE) 0.05 % cream Apply topically 2 (two) times a day 23   AZIZA Hyatt   doxycycline hyclate  (VIBRAMYCIN) 100 mg capsule Take 1 capsule (100 mg total) by mouth 2 (two) times a day Patient will need to continue on this medication indefinitely until outpatient follow-up with ID 8/2/24 9/1/24  Vane Davis MD   furosemide (LASIX) 20 mg tablet Take 1 tablet (20 mg total) by mouth daily 7/6/24 8/5/24  Genie Abbasi MD   levalbuterol (XOPENEX) 1.25 mg/3 mL nebulizer solution Take 3 mL (1.25 mg total) by nebulization every 8 (eight) hours as needed for wheezing or shortness of breath 8/2/24   Vane Davis MD   lidocaine (LIDODERM) 5 % Apply 2 patches topically daily Remove & Discard patch within 12 hours or as directed by MD Do not start before December 20, 2022.  Patient taking differently: Apply 2 patches topically daily Remove & Discard patch within 12 hours or as directed by MD 12/20/22   Ann-Marie Connell DO   magnesium Oxide (MAG-OX) 400 mg TABS Take 1 tablet (400 mg total) by mouth 2 (two) times a day 8/2/24   Vane Davis MD   metoprolol tartrate (LOPRESSOR) 25 mg tablet Take 1 tablet (25 mg total) by mouth every 12 (twelve) hours 11/10/22   Nya Taveras DO   miconazole 2 % cream Apply topically 2 (two) times a day 6/18/24   Jason Quach MD   nystatin (MYCOSTATIN) powder Apply topically 2 (two) times a day 11/25/22   Federico Piña MD   pantoprazole (PROTONIX) 40 mg tablet Take 1 tablet (40 mg total) by mouth daily in the early morning 6/19/24   Jason Quach MD   predniSONE 5 mg tablet Take 1 tablet (5 mg total) by mouth 2 (two) times a day with meals Do not start before June 5, 2023. 6/5/23   AZIZA Hyatt   triamcinolone (KENALOG) 0.1 % cream Apply topically 2 (two) times a day Apply to BUE and BLE topically everyday and evening shift for psoriasis    Historical Provider, MD   Zinc 50 MG TABS Take 1 tablet by mouth in the morning    Historical Provider, MD     I have reviewed home medications using  allscripts.    Allergies:   Allergies   Allergen Reactions    Shellfish-Derived Products - Food Allergy Itching    Methotrexate Derivatives     Amoxicillin Rash    Ampicillin-Sulbactam Sodium Rash       Social History:     Marital Status: Single   Occupation: retired  Patient Pre-hospital Living Situation: [place]  Patient Pre-hospital Level of Mobility: bedbound  Patient Pre-hospital Diet Restrictions: puree/thin  Substance Use History:   Social History     Substance and Sexual Activity   Alcohol Use Not Currently     Social History     Tobacco Use   Smoking Status Never   Smokeless Tobacco Never     Social History     Substance and Sexual Activity   Drug Use No       Family History:    Family History   Problem Relation Age of Onset    Asthma Mother     Stroke Mother     No Known Problems Father     No Known Problems Sister     No Known Problems Brother     No Known Problems Maternal Grandmother     No Known Problems Maternal Grandfather     No Known Problems Paternal Grandmother     No Known Problems Paternal Grandfather     Diabetes Maternal Aunt     Breast cancer Cousin     Diabetes Cousin     Breast cancer Other        Physical Exam:     Vitals:   Blood Pressure: 93/51 (08/03/24 0143)  Pulse: 70 (08/03/24 0143)  Temperature: 97.7 °F (36.5 °C) (08/03/24 0143)  Temp Source: Temporal (08/03/24 0143)  Respirations: 22 (08/03/24 0143)  SpO2: 100 % (08/03/24 0146)    Physical Exam  Constitutional:       General: She is not in acute distress.     Appearance: She is obese. She is ill-appearing. She is not toxic-appearing or diaphoretic.      Comments: Morbid obesity, chronically ill   HENT:      Head: Normocephalic and atraumatic.      Nose: No rhinorrhea.      Mouth/Throat:      Mouth: Mucous membranes are moist.   Cardiovascular:      Rate and Rhythm: Normal rate. Rhythm irregular.      Heart sounds: Murmur heard.   Pulmonary:      Effort: Pulmonary effort is normal.      Breath sounds: Normal breath sounds.       Comments: Poor inspiratory effort. On midflow  Abdominal:      General: Bowel sounds are normal.      Palpations: Abdomen is soft.   Musculoskeletal:         General: Tenderness present.      Right lower leg: No edema.      Left lower leg: Edema present.      Comments: 1+ LLE edema. LUE edema   Skin:     General: Skin is warm.      Findings: Bruising present.      Comments: Ecchymosis B/L UE   Neurological:      Mental Status: She is alert. She is disoriented.   Psychiatric:      Comments: Minimally verbal       Additional Data:     Lab Results: I have personally reviewed pertinent reports.      Results from last 7 days   Lab Units 08/02/24 1919   WBC Thousand/uL 8.44   HEMOGLOBIN g/dL 8.9*   HEMATOCRIT % 29.2*   PLATELETS Thousands/uL 150   SEGS PCT % 81*   LYMPHO PCT % 13*   MONO PCT % 4   EOS PCT % 1     Results from last 7 days   Lab Units 08/02/24  1919 08/02/24  0626   SODIUM mmol/L 147 144   POTASSIUM mmol/L 3.9 3.9   CHLORIDE mmol/L 110* 110*   CO2 mmol/L 24 27   BUN mg/dL 28* 29*   CREATININE mg/dL 1.61* 1.48*   ANION GAP mmol/L 13 7   CALCIUM mg/dL 9.2 8.9   ALBUMIN g/dL  --  2.6*   TOTAL BILIRUBIN mg/dL  --  0.90   ALK PHOS U/L  --  93   ALT U/L  --  55*   AST U/L  --  47*   GLUCOSE RANDOM mg/dL 75 73     Results from last 7 days   Lab Units 08/02/24  2033   INR  2.08*     Results from last 7 days   Lab Units 08/01/24  2116 08/01/24  1121 08/01/24  0737 07/31/24  2058 07/31/24  1628 07/31/24  1112 07/30/24  2109 07/30/24  1815 07/30/24  0756 07/29/24  2100 07/29/24  1522 07/29/24  1127   POC GLUCOSE mg/dl 83 84 74 90 85 56* 87 78 85 104 102 89         Results from last 7 days   Lab Units 07/31/24  0936   PROCALCITONIN ng/ml 1.28*       Imaging: I have personally reviewed pertinent reports.      CT head without contrast   Final Result by Merritt Lazo MD (08/02 2334)      No acute findings.      Findings are consistent with the preliminary report from [facility] which was provided  shortly after completion of the exam.                     Workstation performed: [id number]         CTA ED chest PE study   Final Result by Merritt Lazo MD (08/02 2338)      Mild pulmonary edema with small bibasilar pleural effusions and cardiomegaly in keeping with CHF.      The major findings are in agreement with the preliminary report provided by [facility] which was provided shortly after completion of the exam.                     Workstation performed: [id number]         XR chest 1 view portable   ED Interpretation by Neel Munoz MD (08/02 1950)   Right internal jugular central line terminating in the heart.  Chronic right upper lobe opacity.      Final Result by Lorena John MD (08/02 2208)      No acute cardiopulmonary disease.      Mild benign bilateral linear scar.      Small pleural effusions better shown on subsequent chest CT.      Workstation performed: [id number]             EKG, Pathology, and Other Studies Reviewed on Admission:   Allscripts / Epic Records Reviewed: Yes     ** Please Note: This note has been constructed using a voice recognition system. **

## 2024-08-03 NOTE — ASSESSMENT & PLAN NOTE
Wt Readings from Last 3 Encounters:   08/02/24 95 kg (209 lb 7 oz)   07/11/24 108 kg (238 lb)   07/05/24 108 kg (238 lb 1.6 oz)     Preserved EF >75%. BNP >400  Presented to ED with hypoxia 70 to 80s improved with mid flow  CTA showing mild pulmonary edema with small bibasilar pleural effusions and cardiomegaly  (seen on prior imaging)  Treated for CHF exacerbation during previous admission and transitioned to oral Lasix at time of discharge. Will give 1 dose of IV furosemide 40 mg and resume home dose of Lasix 20 mg daily  Continue metoprolol 25mg BID.  Close BP monitoring  Monitor daily weight, I&O  Low Na diet with fluid restriction

## 2024-08-03 NOTE — PLAN OF CARE
Problem: Prexisting or High Potential for Compromised Skin Integrity  Goal: Skin integrity is maintained or improved  Description: INTERVENTIONS:  - Identify patients at risk for skin breakdown  - Assess and monitor skin integrity  - Assess and monitor nutrition and hydration status  - Monitor labs   - Assess for incontinence   - Turn and reposition patient  - Assist with mobility/ambulation  - Relieve pressure over bony prominences  - Avoid friction and shearing  - Provide appropriate hygiene as needed including keeping skin clean and dry  - Evaluate need for skin moisturizer/barrier cream  - Collaborate with interdisciplinary team   - Patient/family teaching  - Consider wound care consult   Outcome: Progressing     Problem: PAIN - ADULT  Goal: Verbalizes/displays adequate comfort level or baseline comfort level  Description: Interventions:  - Encourage patient to monitor pain and request assistance  - Assess pain using appropriate pain scale  - Administer analgesics based on type and severity of pain and evaluate response  - Implement non-pharmacological measures as appropriate and evaluate response  - Consider cultural and social influences on pain and pain management  - Notify physician/advanced practitioner if interventions unsuccessful or patient reports new pain  Outcome: Progressing     Problem: INFECTION - ADULT  Goal: Absence or prevention of progression during hospitalization  Description: INTERVENTIONS:  - Assess and monitor for signs and symptoms of infection  - Monitor lab/diagnostic results  - Monitor all insertion sites, i.e. indwelling lines, tubes, and drains  - Monitor endotracheal if appropriate and nasal secretions for changes in amount and color  - [place] appropriate cooling/warming therapies per order  - Administer medications as ordered  - Instruct and encourage patient and family to use good hand hygiene technique  - Identify and instruct in appropriate isolation precautions for  identified infection/condition  Outcome: Progressing  Goal: Absence of fever/infection during neutropenic period  Description: INTERVENTIONS:  - Monitor WBC    Outcome: Progressing     Problem: SAFETY ADULT  Goal: Maintain or return to baseline ADL function  Description: INTERVENTIONS:  - Educate patient/family on patient safety including physical limitations  - Instruct patient to call for assistance with activity   - Consult OT/PT to assist with strengthening/mobility   - Keep Call bell within reach  - Keep bed low and locked with side rails adjusted as appropriate  - Keep care items and personal belongings within reach  - Initiate and maintain comfort rounds  - Make Fall Risk Sign visible to staff  - Apply yellow socks and bracelet for high fall risk patients  - Consider moving patient to room near nurses station  Outcome: Progressing  Goal: Maintains/Returns to pre admission functional level  Description: INTERVENTIONS:  - Perform AM-PAC 6 Click Basic Mobility/ Daily Activity assessment daily.  - Set and communicate daily mobility goal to care team and patient/family/caregiver.   - Collaborate with rehabilitation services on mobility goals if consulted  - Out of bed for toileting  - Record patient progress and toleration of activity level   Outcome: Progressing     Problem: DISCHARGE PLANNING  Goal: Discharge to home or other facility with appropriate resources  Description: INTERVENTIONS:  - Identify barriers to discharge w/patient and caregiver  - Arrange for needed discharge resources and transportation as appropriate  - Identify discharge learning needs (meds, wound care, etc.)  - Arrange for interpretive services to assist at discharge as needed  - Refer to Case Management Department for coordinating discharge planning if the patient needs post-hospital services based on physician/advanced practitioner order or complex needs related to functional status, cognitive ability, or social support system  Outcome:  Progressing     Problem: Knowledge Deficit  Goal: Patient/family/caregiver demonstrates understanding of disease process, treatment plan, medications, and discharge instructions  Description: Complete learning assessment and assess knowledge base.  Interventions:  - Provide teaching at level of understanding  - Provide teaching via preferred learning methods  Outcome: Progressing

## 2024-08-03 NOTE — ASSESSMENT & PLAN NOTE
On suppressive antibiotic therapy with oral doxycycline 100mg BID for vertebral osteomyelitis. Per ID: planned prolonged course ideally until inflammatory markers normalized/plateau

## 2024-08-03 NOTE — ASSESSMENT & PLAN NOTE
Persistent left upper extremity swelling thought to be 2/2 superficial thrombophlebitis vs dependent edema vs swelling from IV line  Venous duplex 8/01 negative for DVT:  Technically difficult/limited study. The patient refused to move her arm. Unable to visualize the brachial, cephalic, and basilic veins. Unable to visualize the forearm.  Continue supportive treatment with rest, ice and elevation  PRN Analgesics  Neurovascular checks

## 2024-08-03 NOTE — ASSESSMENT & PLAN NOTE
Continues with troponin elevation thought to be secondary to sepsis and AMANDA  Hypoxic respiratory failure also likely contributing

## 2024-08-03 NOTE — ASSESSMENT & PLAN NOTE
Patient was admitted 7/21-8/02 for sepsis, AMANDA, and afib RVR. During admission she was transferred to ICU for septic shock requring pressors. She had confusion suspected to be delirium. She returned back to baseline mental status and was d/c back to [place].  Returned to ED for dyspnea and hypoxia    O2 sat 70 to 80s improved with nonrebreather.  Transitioned to mid flow  CTA neg for PE. Mild pulmonary edema with small bibasilar pleural effusions    Evaluate for home O2 prior to discharge  See plan for CHF exacerbation

## 2024-08-03 NOTE — ASSESSMENT & PLAN NOTE
Lab Results   Component Value Date    EGFR 30 08/02/2024    EGFR 34 08/02/2024    EGFR 34 08/01/2024    CREATININE 1.61 (H) 08/02/2024    CREATININE 1.48 (H) 08/02/2024    CREATININE 1.46 (H) 08/01/2024     CKD 3 at baseline

## 2024-08-03 NOTE — ASSESSMENT & PLAN NOTE
Presents again with encephalopathy.  In setting of hypoxia  Recent admission for encephalopathy, was obtunded requiring transfer to ICU, thought to be secondary to worsening hypoactive delirium  CTH negative for acute intracranial abnormalities  Seems to have resolved

## 2024-08-04 PROBLEM — N17.9 ACUTE RENAL FAILURE SUPERIMPOSED ON STAGE 3 CHRONIC KIDNEY DISEASE (HCC): Status: ACTIVE | Noted: 2023-05-27

## 2024-08-04 LAB
ANION GAP SERPL CALCULATED.3IONS-SCNC: 10 MMOL/L (ref 4–13)
ANION GAP SERPL CALCULATED.3IONS-SCNC: 12 MMOL/L (ref 4–13)
ANION GAP SERPL CALCULATED.3IONS-SCNC: 14 MMOL/L (ref 4–13)
ATRIAL RATE: 105 BPM
ATRIAL RATE: 96 BPM
BUN SERPL-MCNC: 30 MG/DL (ref 5–25)
BUN SERPL-MCNC: 31 MG/DL (ref 5–25)
BUN SERPL-MCNC: 33 MG/DL (ref 5–25)
CALCIUM SERPL-MCNC: 8.6 MG/DL (ref 8.4–10.2)
CALCIUM SERPL-MCNC: 8.6 MG/DL (ref 8.4–10.2)
CALCIUM SERPL-MCNC: 8.8 MG/DL (ref 8.4–10.2)
CHLORIDE SERPL-SCNC: 111 MMOL/L (ref 96–108)
CHLORIDE SERPL-SCNC: 112 MMOL/L (ref 96–108)
CHLORIDE SERPL-SCNC: 113 MMOL/L (ref 96–108)
CO2 SERPL-SCNC: 23 MMOL/L (ref 21–32)
CO2 SERPL-SCNC: 25 MMOL/L (ref 21–32)
CO2 SERPL-SCNC: 25 MMOL/L (ref 21–32)
CREAT SERPL-MCNC: 2.15 MG/DL (ref 0.6–1.3)
CREAT SERPL-MCNC: 2.25 MG/DL (ref 0.6–1.3)
CREAT SERPL-MCNC: 2.3 MG/DL (ref 0.6–1.3)
GFR SERPL CREATININE-BSD FRML MDRD: 20 ML/MIN/1.73SQ M
GFR SERPL CREATININE-BSD FRML MDRD: 20 ML/MIN/1.73SQ M
GFR SERPL CREATININE-BSD FRML MDRD: 21 ML/MIN/1.73SQ M
GLUCOSE SERPL-MCNC: 100 MG/DL (ref 65–140)
GLUCOSE SERPL-MCNC: 57 MG/DL (ref 65–140)
GLUCOSE SERPL-MCNC: 81 MG/DL (ref 65–140)
MAGNESIUM SERPL-MCNC: 1.7 MG/DL (ref 1.9–2.7)
POTASSIUM SERPL-SCNC: 3.7 MMOL/L (ref 3.5–5.3)
POTASSIUM SERPL-SCNC: 3.7 MMOL/L (ref 3.5–5.3)
POTASSIUM SERPL-SCNC: 4 MMOL/L (ref 3.5–5.3)
QRS AXIS: 21 DEGREES
QRS AXIS: 44 DEGREES
QRSD INTERVAL: 82 MS
QRSD INTERVAL: 88 MS
QT INTERVAL: 320 MS
QT INTERVAL: 346 MS
QTC INTERVAL: 420 MS
QTC INTERVAL: 461 MS
SODIUM SERPL-SCNC: 146 MMOL/L (ref 135–147)
SODIUM SERPL-SCNC: 149 MMOL/L (ref 135–147)
SODIUM SERPL-SCNC: 150 MMOL/L (ref 135–147)
T WAVE AXIS: -31 DEGREES
T WAVE AXIS: -71 DEGREES
VENTRICULAR RATE: 104 BPM
VENTRICULAR RATE: 107 BPM

## 2024-08-04 PROCEDURE — 83735 ASSAY OF MAGNESIUM: CPT | Performed by: NURSE PRACTITIONER

## 2024-08-04 PROCEDURE — 80048 BASIC METABOLIC PNL TOTAL CA: CPT | Performed by: NURSE PRACTITIONER

## 2024-08-04 PROCEDURE — 80048 BASIC METABOLIC PNL TOTAL CA: CPT | Performed by: STUDENT IN AN ORGANIZED HEALTH CARE EDUCATION/TRAINING PROGRAM

## 2024-08-04 PROCEDURE — 99233 SBSQ HOSP IP/OBS HIGH 50: CPT | Performed by: STUDENT IN AN ORGANIZED HEALTH CARE EDUCATION/TRAINING PROGRAM

## 2024-08-04 RX ORDER — HYDROXYCHLOROQUINE SULFATE 200 MG/1
200 TABLET, FILM COATED ORAL 2 TIMES DAILY
Status: DISCONTINUED | OUTPATIENT
Start: 2024-08-04 | End: 2024-08-07

## 2024-08-04 RX ORDER — MAGNESIUM SULFATE HEPTAHYDRATE 40 MG/ML
2 INJECTION, SOLUTION INTRAVENOUS ONCE
Status: COMPLETED | OUTPATIENT
Start: 2024-08-04 | End: 2024-08-05

## 2024-08-04 RX ORDER — DEXTROSE MONOHYDRATE 50 MG/ML
75 INJECTION, SOLUTION INTRAVENOUS CONTINUOUS
Status: DISCONTINUED | OUTPATIENT
Start: 2024-08-04 | End: 2024-08-06

## 2024-08-04 RX ADMIN — DOXYCYCLINE HYCLATE 100 MG: 100 CAPSULE ORAL at 08:46

## 2024-08-04 RX ADMIN — METOPROLOL TARTRATE 25 MG: 25 TABLET, FILM COATED ORAL at 12:03

## 2024-08-04 RX ADMIN — DOXYCYCLINE HYCLATE 100 MG: 100 CAPSULE ORAL at 20:58

## 2024-08-04 RX ADMIN — DEXTROSE 75 ML/HR: 5 SOLUTION INTRAVENOUS at 08:44

## 2024-08-04 RX ADMIN — METOPROLOL TARTRATE 25 MG: 25 TABLET, FILM COATED ORAL at 20:58

## 2024-08-04 RX ADMIN — APIXABAN 5 MG: 5 TABLET, FILM COATED ORAL at 08:46

## 2024-08-04 RX ADMIN — Medication 400 MG: at 17:31

## 2024-08-04 RX ADMIN — PREDNISONE 5 MG: 5 TABLET ORAL at 17:31

## 2024-08-04 RX ADMIN — HYDROXYCHLOROQUINE SULFATE 200 MG: 200 TABLET ORAL at 17:31

## 2024-08-04 RX ADMIN — FUROSEMIDE 20 MG: 20 TABLET ORAL at 12:03

## 2024-08-04 RX ADMIN — MAGNESIUM SULFATE HEPTAHYDRATE 2 G: 40 INJECTION, SOLUTION INTRAVENOUS at 08:39

## 2024-08-04 RX ADMIN — PREDNISONE 5 MG: 5 TABLET ORAL at 08:47

## 2024-08-04 RX ADMIN — APIXABAN 5 MG: 5 TABLET, FILM COATED ORAL at 17:31

## 2024-08-04 RX ADMIN — DEXTROSE 75 ML/HR: 5 SOLUTION INTRAVENOUS at 22:06

## 2024-08-04 NOTE — ASSESSMENT & PLAN NOTE
Presents again with encephalopathy.  In setting of hypoxia  Recent admission for encephalopathy, was obtunded requiring transfer to ICU, thought to be secondary to worsening hypoactive delirium  CTH negative for acute intracranial abnormalities  RESOLVED

## 2024-08-04 NOTE — ASSESSMENT & PLAN NOTE
Lab Results   Component Value Date    EGFR 20 08/04/2024    EGFR 21 08/04/2024    EGFR 30 08/02/2024    CREATININE 2.25 (H) 08/04/2024    CREATININE 2.15 (H) 08/04/2024    CREATININE 1.61 (H) 08/02/2024     CKD 3   Creatinine bumped up, likely prerenal given patient's refusal to eat or drink in the last 24 hours  Gentle IVF hydration started for AMANDA and hypernatremia  Lasix held, reassess volume status in am  Continue to monitor BMP, avoid nephrotoxins, monitor I&Os

## 2024-08-04 NOTE — ASSESSMENT & PLAN NOTE
Patient was admitted 7/21-8/02 for sepsis, AMANDA, and afib RVR. During admission she was transferred to ICU for septic shock requring pressors. She had confusion suspected to be delirium. She returned back to baseline mental status and was d/c back to [place].  Returned to ED for dyspnea and hypoxia. 2/2 CHF vs aspiration event    O2 sat 70 to 80s improved with nonrebreather.  Transitioned to mid flow  CTA neg for PE. Mild pulmonary edema with small bibasilar pleural effusions (similar to previous imaging)  Given a dose of lasix  RESOLVED, comfortable on room air

## 2024-08-04 NOTE — PLAN OF CARE
Problem: Prexisting or High Potential for Compromised Skin Integrity  Goal: Skin integrity is maintained or improved  Description: INTERVENTIONS:  - Identify patients at risk for skin breakdown  - Assess and monitor skin integrity  - Assess and monitor nutrition and hydration status  - Monitor labs   - Assess for incontinence   - Turn and reposition patient  - Assist with mobility/ambulation  - Relieve pressure over bony prominences  - Avoid friction and shearing  - Provide appropriate hygiene as needed including keeping skin clean and dry  - Evaluate need for skin moisturizer/barrier cream  - Collaborate with interdisciplinary team   - Patient/family teaching  - Consider wound care consult   Outcome: Progressing     Problem: PAIN - ADULT  Goal: Verbalizes/displays adequate comfort level or baseline comfort level  Description: Interventions:  - Encourage patient to monitor pain and request assistance  - Assess pain using appropriate pain scale  - Administer analgesics based on type and severity of pain and evaluate response  - Implement non-pharmacological measures as appropriate and evaluate response  - Consider cultural and social influences on pain and pain management  - Notify physician/advanced practitioner if interventions unsuccessful or patient reports new pain  Outcome: Progressing     Problem: INFECTION - ADULT  Goal: Absence or prevention of progression during hospitalization  Description: INTERVENTIONS:  - Assess and monitor for signs and symptoms of infection  - Monitor lab/diagnostic results  - Monitor all insertion sites, i.e. indwelling lines, tubes, and drains  - Monitor endotracheal if appropriate and nasal secretions for changes in amount and color  - [place] appropriate cooling/warming therapies per order  - Administer medications as ordered  - Instruct and encourage patient and family to use good hand hygiene technique  - Identify and instruct in appropriate isolation precautions for  identified infection/condition  Outcome: Progressing  Goal: Absence of fever/infection during neutropenic period  Description: INTERVENTIONS:  - Monitor WBC    Outcome: Progressing     Problem: SAFETY ADULT  Goal: Maintain or return to baseline ADL function  Description: INTERVENTIONS:  - Educate patient/family on patient safety including physical limitations  - Instruct patient to call for assistance with activity   - Consult OT/PT to assist with strengthening/mobility   - Keep Call bell within reach  - Keep bed low and locked with side rails adjusted as appropriate  - Keep care items and personal belongings within reach  - Initiate and maintain comfort rounds  - Make Fall Risk Sign visible to staff  - Apply yellow socks and bracelet for high fall risk patients  - Consider moving patient to room near nurses station  Outcome: Progressing  Goal: Maintains/Returns to pre admission functional level  Description: INTERVENTIONS:  - Perform AM-PAC 6 Click Basic Mobility/ Daily Activity assessment daily.  - Set and communicate daily mobility goal to care team and patient/family/caregiver.   - Collaborate with rehabilitation services on mobility goals if consulted  - Out of bed for toileting  - Record patient progress and toleration of activity level   Outcome: Progressing     Problem: DISCHARGE PLANNING  Goal: Discharge to home or other facility with appropriate resources  Description: INTERVENTIONS:  - Identify barriers to discharge w/patient and caregiver  - Arrange for needed discharge resources and transportation as appropriate  - Identify discharge learning needs (meds, wound care, etc.)  - Arrange for interpretive services to assist at discharge as needed  - Refer to Case Management Department for coordinating discharge planning if the patient needs post-hospital services based on physician/advanced practitioner order or complex needs related to functional status, cognitive ability, or social support system  Outcome:  Progressing     Problem: Knowledge Deficit  Goal: Patient/family/caregiver demonstrates understanding of disease process, treatment plan, medications, and discharge instructions  Description: Complete learning assessment and assess knowledge base.  Interventions:  - Provide teaching at level of understanding  - Provide teaching via preferred learning methods  Outcome: Progressing     Problem: Nutrition/Hydration-ADULT  Goal: Nutrient/Hydration intake appropriate for improving, restoring or maintaining nutritional needs  Description: Monitor and assess patient's nutrition/hydration status for malnutrition. Collaborate with interdisciplinary team and initiate plan and interventions as ordered.  Monitor patient's weight and dietary intake as ordered or per policy. Utilize nutrition screening tool and intervene as necessary. Determine patient's food preferences and provide high-protein, high-caloric foods as appropriate.     INTERVENTIONS:  - Monitor oral intake, urinary output, labs, and treatment plans  - Assess nutrition and hydration status and recommend course of action  - Evaluate amount of meals eaten  - Assist patient with eating if necessary   - Allow adequate time for meals  - Recommend/ encourage appropriate diets, oral nutritional supplements, and vitamin/mineral supplements  - Order, calculate, and assess calorie counts as needed  - Recommend, monitor, and adjust tube feedings and TPN/PPN based on assessed needs  - Assess need for intravenous fluids  - Provide specific nutrition/hydration education as appropriate  - Include patient/family/caregiver in decisions related to nutrition  Outcome: Not Progressing

## 2024-08-04 NOTE — PROGRESS NOTES
Formerly [facility], [facility]  Progress Note  Name: Bhavna Al I  MRN: [id number]  Unit/Bed#: E4 -01 I Date of Admission: 8/2/2024   Date of Service: 8/4/2024 I Hospital Day: 1    Assessment & Plan   * Acute respiratory failure with hypoxia (HCC)  Assessment & Plan  Patient was admitted 7/21-8/02 for sepsis, AMANDA, and afib RVR. During admission she was transferred to ICU for septic shock requring pressors. She had confusion suspected to be delirium. She returned back to baseline mental status and was d/c back to [place].  Returned to ED for dyspnea and hypoxia. 2/2 CHF vs aspiration event    O2 sat 70 to 80s improved with nonrebreather.  Transitioned to mid flow  CTA neg for PE. Mild pulmonary edema with small bibasilar pleural effusions (similar to previous imaging)  Given a dose of lasix  RESOLVED, comfortable on room air    Acute renal failure superimposed on stage 3 chronic kidney disease (HCC)  Assessment & Plan  Lab Results   Component Value Date    EGFR 20 08/04/2024    EGFR 21 08/04/2024    EGFR 30 08/02/2024    CREATININE 2.25 (H) 08/04/2024    CREATININE 2.15 (H) 08/04/2024    CREATININE 1.61 (H) 08/02/2024     CKD 3   Creatinine bumped up, likely prerenal given patient's refusal to eat or drink in the last 24 hours  Gentle IVF hydration started for AMANDA and hypernatremia  Lasix held, reassess volume status in am  Continue to monitor BMP, avoid nephrotoxins, monitor I&Os    Acute metabolic encephalopathy  Assessment & Plan  Presents again with encephalopathy.  In setting of hypoxia  Recent admission for encephalopathy, was obtunded requiring transfer to ICU, thought to be secondary to worsening hypoactive delirium  CTH negative for acute intracranial abnormalities  RESOLVED    Acute on chronic diastolic heart failure, LVEF 65%, LVIDd 3.3 cm, AHA Stage C  Assessment & Plan  Wt Readings from Last 3 Encounters:   08/02/24 95 kg (209 lb 7 oz)   07/11/24 108 kg (238 lb)   07/05/24 108 kg (238 lb  1.6 oz)     Preserved EF >75%. BNP >400  Presented to ED with hypoxia 70 to 80s improved with mid flow  CTA showing mild pulmonary edema with small bibasilar pleural effusions and cardiomegaly  Received 1 dose of IV furosemide 40 mg in the ED then po dose resumed  Given AMANDA and hypernatremia with patient refusing po intake, will hold po lasix today and reevaluate volume status in am  Continue metoprolol 25mg BID.  Close BP monitoring  Monitor daily weight, I&O  Low Na diet with fluid restriction    Hypernatremia  Assessment & Plan  Hypernatremic to 150 this am with AMANDA in setting of refusal to take anything po over 24 hours  Start gentle IVF hydration given CHF status  Encouraged po intake  Q6h BMP  Caution not to overcorrect    Hypomagnesemia  Assessment & Plan  Mg 1.6.  Replete.  Continue to monitor    Chronic anemia  Assessment & Plan  Hemoglobin at baseline    Pain and swelling of left upper extremity  Assessment & Plan  Persistent left upper extremity swelling thought to be 2/2 superficial thrombophlebitis vs dependent edema vs swelling from IV line  Venous duplex 8/01 negative for DVT:  Technically difficult/limited study. The patient refused to move her arm. Unable to visualize the brachial, cephalic, and basilic veins. Unable to visualize the forearm.  Continue supportive treatment with rest, ice and elevation  PRN Analgesics  Neurovascular checks    Elevated troponin  Assessment & Plan  Continues with troponin elevation thought to be secondary to sepsis and AMANDA  Hypoxic respiratory failure also likely contributing    Persistent atrial fibrillation  Assessment & Plan  Rate control: Metoprolol 25 mg BID  Anticoagulation: Eliquis 5mg BID    Dysphagia  Assessment & Plan  Continue purée with thin liquids per SLP note on 8/01  Aspiration precautions    MRSA bacteremia  Assessment & Plan  On suppressive antibiotic therapy with oral doxycycline 100mg BID for vertebral osteomyelitis. Per ID: planned prolonged course  ideally until inflammatory markers normalized/plateau     Acute osteomyelitis of thoracic spine (HCC)  Assessment & Plan  On suppressive antibiotic therapy with oral doxycycline for sacral osteomyelitis with MRSA     Rheumatoid arthritis (HCC)  Assessment & Plan  Continue prednisone 5 mg BID  Continue Plaquenil 200 mg daily           VTE Pharmacologic Prophylaxis:   Moderate Risk (Score 3-4) - Pharmacological DVT Prophylaxis Ordered: apixaban (Eliquis).    Mobility:   Basic Mobility Inpatient Raw Score: 6  JH-HLM Goal: 2: Bed activities/Dependent transfer  JH-HLM Achieved: 2: Bed activities/Dependent transfer  JH-HLM Goal achieved. Continue to encourage appropriate mobility.    Patient Centered Rounds: I performed bedside rounds with nursing staff today.     Education and Discussions with Family / Patient: Updated  (daughter) via phone.    Current Length of Stay: 1 day(s)  Current Patient Status: Inpatient   Certification Statement: The patient will continue to require additional inpatient hospital stay due to AMANDA, hypernatremia  Discharge Plan: Anticipate discharge in 24-48 hrs to [facility] Status: Level 1 - Full Code    Subjective:   Patient seen and examined at bedside. States she is feeling better but is very thirsty and would like to drink some water. States her left arm pain is improved. States she wants to get out of the hospital. Denies any other complaints at this time.     Objective:     Vitals:   Temp (24hrs), Av.9 °F (36.6 °C), Min:97.6 °F (36.4 °C), Max:98.4 °F (36.9 °C)    Temp:  [97.6 °F (36.4 °C)-98.4 °F (36.9 °C)] 97.7 °F (36.5 °C)  HR:  [] 86  Resp:  [16-20] 16  BP: ()/(53-68) 128/58  SpO2:  [97 %-100 %] 100 %  There is no height or weight on file to calculate BMI.     Input and Output Summary (last 24 hours):     Intake/Output Summary (Last 24 hours) at 2024 1558  Last data filed at 2024 0800  Gross per 24 hour   Intake 0 ml   Output 300 ml   Net -300         Physical Exam:   Physical Exam  Vitals and nursing note reviewed.   Constitutional:       General: She is not in acute distress.     Appearance: She is well-developed.   HENT:      Head: Normocephalic and atraumatic.   Eyes:      Conjunctiva/sclera: Conjunctivae normal.   Cardiovascular:      Rate and Rhythm: Normal rate. Rhythm irregular.      Heart sounds: No murmur heard.  Pulmonary:      Effort: Pulmonary effort is normal. No respiratory distress.      Breath sounds: Normal breath sounds.   Abdominal:      General: There is no distension.      Palpations: Abdomen is soft.      Tenderness: There is no abdominal tenderness. There is no guarding.   Musculoskeletal:         General: No swelling.      Cervical back: Neck supple.      Right lower leg: No edema.      Left lower leg: No edema.   Skin:     General: Skin is warm and dry.      Capillary Refill: Capillary refill takes less than 2 seconds.   Neurological:      Mental Status: She is alert.   Psychiatric:         Mood and Affect: Mood normal.            Additional Data:     Labs:  Results from last 7 days   Lab Units 08/03/24  0525   WBC Thousand/uL 4.90   HEMOGLOBIN g/dL 10.7*   HEMATOCRIT % 34.2*   PLATELETS Thousands/uL 80*   SEGS PCT % 78*   LYMPHO PCT % 14   MONO PCT % 5   EOS PCT % 1     Results from last 7 days   Lab Units 08/04/24  1203 08/02/24  1919 08/02/24  0626   SODIUM mmol/L 149*   < > 144   POTASSIUM mmol/L 3.7   < > 3.9   CHLORIDE mmol/L 112*   < > 110*   CO2 mmol/L 23   < > 27   BUN mg/dL 30*   < > 29*   CREATININE mg/dL 2.25*   < > 1.48*   ANION GAP mmol/L 14*   < > 7   CALCIUM mg/dL 8.8   < > 8.9   ALBUMIN g/dL  --   --  2.6*   TOTAL BILIRUBIN mg/dL  --   --  0.90   ALK PHOS U/L  --   --  93   ALT U/L  --   --  55*   AST U/L  --   --  47*   GLUCOSE RANDOM mg/dL 81   < > 73    < > = values in this interval not displayed.     Results from last 7 days   Lab Units 08/02/24  2033   INR  2.08*     Results from last 7 days   Lab Units  08/01/24  2116 08/01/24  1121 08/01/24  0737 07/31/24  2058 07/31/24  1628 07/31/24  1112 07/30/24  2109 07/30/24  1815 07/30/24  0756 07/29/24  2100 07/29/24  1522 07/29/24  1127   POC GLUCOSE mg/dl 83 84 74 90 85 56* 87 78 85 104 102 89         Results from last 7 days   Lab Units 07/31/24  0936   PROCALCITONIN ng/ml 1.28*       Lines/Drains:  Invasive Devices       Central Venous Catheter Line  Duration             CVC Central Lines 08/02/24 Triple 16cm 1 day              Drain  Duration             External Urinary Catheter 1 day                         Imaging: Reviewed radiology reports from this admission including: chest CT scan and CT head    Recent Cultures (last 7 days):         Last 24 Hours Medication List:   Current Facility-Administered Medications   Medication Dose Route Frequency Provider Last Rate    acetaminophen  975 mg Oral Q6H PRN AZIZA Escalera      apixaban  5 mg Oral BID AZIZA Escalera      bisacodyl  10 mg Rectal Daily PRN Vane Davis MD      dextrose  75 mL/hr Intravenous Continuous Vane Davis MD 75 mL/hr (08/04/24 0844)    doxycycline hyclate  100 mg Oral BID AZIZA Escalera      hydroxychloroquine  200 mg Oral BID Vane Davis MD      levalbuterol  1.25 mg Nebulization Q8H PRN AZIZA Escalera      magnesium Oxide  400 mg Oral BID AZIZA Escalera      metoprolol tartrate  25 mg Oral Q12H [facility] AZIZA Escalera      omeprazole (PRILOSEC) suspension 2 mg/mL  40 mg Oral Daily AZIZA Escalera      ondansetron  4 mg Intravenous Q6H PRN AZIZA Escalera      oxyCODONE  5 mg Oral Q6H PRN AZIZA Escalera      polyethylene glycol  17 g Oral Daily PRN AZIZA Escalera      predniSONE  5 mg Oral BID With Meals AZIZA Escalera      simethicone  80 mg Oral 4x Daily PRN AZIZA Escalera          Today, Patient Was Seen By:  Vane Ramirez MD    **Please Note: This note may have been constructed using a voice recognition system.**

## 2024-08-04 NOTE — ASSESSMENT & PLAN NOTE
Hypernatremic to 150 this am with AMANDA in setting of refusal to take anything po over 24 hours  Start gentle IVF hydration given CHF status  Encouraged po intake  Q6h BMP  Caution not to overcorrect

## 2024-08-04 NOTE — ASSESSMENT & PLAN NOTE
Wt Readings from Last 3 Encounters:   08/02/24 95 kg (209 lb 7 oz)   07/11/24 108 kg (238 lb)   07/05/24 108 kg (238 lb 1.6 oz)     Preserved EF >75%. BNP >400  Presented to ED with hypoxia 70 to 80s improved with mid flow  CTA showing mild pulmonary edema with small bibasilar pleural effusions and cardiomegaly  Received 1 dose of IV furosemide 40 mg in the ED then po dose resumed  Given AMANDA and hypernatremia with patient refusing po intake, will hold po lasix today and reevaluate volume status in am  Continue metoprolol 25mg BID.  Close BP monitoring  Monitor daily weight, I&O  Low Na diet with fluid restriction

## 2024-08-05 LAB
ANION GAP SERPL CALCULATED.3IONS-SCNC: 8 MMOL/L (ref 4–13)
ANION GAP SERPL CALCULATED.3IONS-SCNC: 8 MMOL/L (ref 4–13)
ANION GAP SERPL CALCULATED.3IONS-SCNC: 9 MMOL/L (ref 4–13)
BUN SERPL-MCNC: 30 MG/DL (ref 5–25)
BUN SERPL-MCNC: 32 MG/DL (ref 5–25)
BUN SERPL-MCNC: 32 MG/DL (ref 5–25)
CALCIUM SERPL-MCNC: 8.3 MG/DL (ref 8.4–10.2)
CALCIUM SERPL-MCNC: 8.6 MG/DL (ref 8.4–10.2)
CALCIUM SERPL-MCNC: 8.6 MG/DL (ref 8.4–10.2)
CHLORIDE SERPL-SCNC: 107 MMOL/L (ref 96–108)
CHLORIDE SERPL-SCNC: 108 MMOL/L (ref 96–108)
CHLORIDE SERPL-SCNC: 110 MMOL/L (ref 96–108)
CO2 SERPL-SCNC: 25 MMOL/L (ref 21–32)
CO2 SERPL-SCNC: 26 MMOL/L (ref 21–32)
CO2 SERPL-SCNC: 26 MMOL/L (ref 21–32)
CREAT SERPL-MCNC: 2.33 MG/DL (ref 0.6–1.3)
CREAT SERPL-MCNC: 2.47 MG/DL (ref 0.6–1.3)
CREAT SERPL-MCNC: 2.49 MG/DL (ref 0.6–1.3)
GFR SERPL CREATININE-BSD FRML MDRD: 18 ML/MIN/1.73SQ M
GFR SERPL CREATININE-BSD FRML MDRD: 18 ML/MIN/1.73SQ M
GFR SERPL CREATININE-BSD FRML MDRD: 19 ML/MIN/1.73SQ M
GLUCOSE SERPL-MCNC: 100 MG/DL (ref 65–140)
GLUCOSE SERPL-MCNC: 110 MG/DL (ref 65–140)
GLUCOSE SERPL-MCNC: 96 MG/DL (ref 65–140)
GLUCOSE SERPL-MCNC: 97 MG/DL (ref 65–140)
MAGNESIUM SERPL-MCNC: 2.4 MG/DL (ref 1.9–2.7)
POTASSIUM SERPL-SCNC: 3.4 MMOL/L (ref 3.5–5.3)
POTASSIUM SERPL-SCNC: 3.8 MMOL/L (ref 3.5–5.3)
POTASSIUM SERPL-SCNC: 3.8 MMOL/L (ref 3.5–5.3)
SODIUM SERPL-SCNC: 140 MMOL/L (ref 135–147)
SODIUM SERPL-SCNC: 142 MMOL/L (ref 135–147)
SODIUM SERPL-SCNC: 145 MMOL/L (ref 135–147)

## 2024-08-05 PROCEDURE — 82948 REAGENT STRIP/BLOOD GLUCOSE: CPT

## 2024-08-05 PROCEDURE — 80048 BASIC METABOLIC PNL TOTAL CA: CPT | Performed by: STUDENT IN AN ORGANIZED HEALTH CARE EDUCATION/TRAINING PROGRAM

## 2024-08-05 PROCEDURE — 99232 SBSQ HOSP IP/OBS MODERATE 35: CPT | Performed by: INTERNAL MEDICINE

## 2024-08-05 PROCEDURE — 83735 ASSAY OF MAGNESIUM: CPT | Performed by: STUDENT IN AN ORGANIZED HEALTH CARE EDUCATION/TRAINING PROGRAM

## 2024-08-05 RX ADMIN — DEXTROSE 75 ML/HR: 5 SOLUTION INTRAVENOUS at 12:41

## 2024-08-05 NOTE — ASSESSMENT & PLAN NOTE
Hypernatremic to 150 this am with AMANDA in setting of refusal to take anything po over 24 hours  Start gentle IVF hydration given CHF status  Encouraged po intake  Improving, sodium 140 today

## 2024-08-05 NOTE — PROGRESS NOTES
[facility]  Progress Note  Name: Bhavna Al I  MRN: [id number]  Unit/Bed#: E4 -01 I Date of Admission: 8/2/2024   Date of Service: 8/5/2024 I Hospital Day: 2    Assessment & Plan   * Acute respiratory failure with hypoxia (HCC)  Assessment & Plan  Patient was admitted 7/21-8/02 for sepsis, AMANDA, and afib RVR. During admission she was transferred to ICU for septic shock requring pressors. She had confusion suspected to be delirium. She returned back to baseline mental status and was d/c back to [place].  Returned to ED for dyspnea and hypoxia. 2/2 CHF vs aspiration event    O2 sat 70 to 80s improved with nonrebreather.  Transitioned to mid flow  CTA neg for PE. Mild pulmonary edema with small bibasilar pleural effusions (similar to previous imaging)  Given a dose of lasix  RESOLVED, comfortable on room air    Hypomagnesemia  Assessment & Plan  Mg 1.6.  Replete.  Continue to monitor    Chronic anemia  Assessment & Plan  Hemoglobin at baseline    Pain and swelling of left upper extremity  Assessment & Plan  Persistent left upper extremity swelling thought to be 2/2 superficial thrombophlebitis vs dependent edema vs swelling from IV line  Venous duplex 8/01 negative for DVT:  Technically difficult/limited study. The patient refused to move her arm. Unable to visualize the brachial, cephalic, and basilic veins. Unable to visualize the forearm.  Continue supportive treatment with rest, ice and elevation  PRN Analgesics  Neurovascular checks    Elevated troponin  Assessment & Plan  Continues with troponin elevation thought to be secondary to sepsis and AMANDA  Hypoxic respiratory failure also likely contributing    Persistent atrial fibrillation  Assessment & Plan  Rate control: Metoprolol 25 mg BID  Anticoagulation: Eliquis 5mg BID    Dysphagia  Assessment & Plan  Continue purée with thin liquids per SLP note on 8/01  Aspiration precautions    MRSA bacteremia  Assessment & Plan  On  suppressive antibiotic therapy with oral doxycycline 100mg BID for vertebral osteomyelitis. Per ID: planned prolonged course ideally until inflammatory markers normalized/plateau     Acute osteomyelitis of thoracic spine (HCC)  Assessment & Plan  On suppressive antibiotic therapy with oral doxycycline for sacral osteomyelitis with MRSA     Hypernatremia  Assessment & Plan  Hypernatremic to 150 this am with AMANDA in setting of refusal to take anything po over 24 hours  Start gentle IVF hydration given CHF status  Encouraged po intake  Improving, sodium 140 today    Acute renal failure superimposed on stage 3 chronic kidney disease (HCC)  Assessment & Plan  Lab Results   Component Value Date    EGFR 18 08/05/2024    EGFR 18 08/05/2024    EGFR 19 08/05/2024    CREATININE 2.49 (H) 08/05/2024    CREATININE 2.47 (H) 08/05/2024    CREATININE 2.33 (H) 08/05/2024     CKD 3   Creatinine bumped up, likely prerenal given patient's refusal to eat or drink in the last 24 hours  Gentle IVF hydration started for AMANDA and hypernatremia  Lasix held, reassess volume status in am  Continue to monitor BMP, avoid nephrotoxins, monitor I&Os    Acute metabolic encephalopathy  Assessment & Plan  Presents again with encephalopathy.  In setting of hypoxia  Recent admission for encephalopathy, was obtunded requiring transfer to ICU, thought to be secondary to worsening hypoactive delirium  CTH negative for acute intracranial abnormalities  RESOLVED    Acute on chronic diastolic heart failure, LVEF 65%, LVIDd 3.3 cm, AHA Stage C  Assessment & Plan  Wt Readings from Last 3 Encounters:   08/02/24 95 kg (209 lb 7 oz)   07/11/24 108 kg (238 lb)   07/05/24 108 kg (238 lb 1.6 oz)     Preserved EF >75%. BNP >400  Presented to ED with hypoxia 70 to 80s improved with mid flow  CTA showing mild pulmonary edema with small bibasilar pleural effusions and cardiomegaly  Received 1 dose of IV furosemide 40 mg in the ED then po dose resumed  Given AMANDA and  hypernatremia with patient refusing po intake, will hold po lasix today and reevaluate volume status in am  Continue metoprolol 25mg BID.  Close BP monitoring  Monitor daily weight, I&O  Low Na diet with fluid restriction    Rheumatoid arthritis (HCC)  Assessment & Plan  Continue prednisone 5 mg BID  Continue Plaquenil 200 mg daily             Mobility:  Basic Mobility Inpatient Raw Score: 6  JH-HLM Goal: 2: Bed activities/Dependent transfer  JH-HLM Achieved: 2: Bed activities/Dependent transfer  JH-HLM Goal achieved. Continue to encourage appropriate mobility.    VTE Pharmacologic Prophylaxis:   Pharmacologic: eliquis    Patient Centered Rounds: I have performed bedside rounds with nursing staff today.    Education and Discussions with Family / Patient: updated daughter Rhoda    Time Spent for Care:   More than 50% of total time spent on counseling and coordination of care as described above.    Current Length of Stay: 2 day(s)    Current Patient Status: Inpatient   Certification Statement: The patient will continue to require additional inpatient hospital stay due to acute kidney injury, hypernatremia    Discharge Plan / Estimated Discharge Date: TBD    Code Status: Level 1 - Full Code      Subjective:   Patient seen and examined at bedside, has been refusing to take her medications, poor p.o. intake.  Having 25% of her meals.    Objective:     Vitals:   Temp (24hrs), Av.2 °F (36.2 °C), Min:96.5 °F (35.8 °C), Max:97.7 °F (36.5 °C)    Temp:  [96.5 °F (35.8 °C)-97.7 °F (36.5 °C)] 96.5 °F (35.8 °C)  HR:  [60-84] 84  Resp:  [16-18] 18  BP: (104-117)/(50-76) 104/54  SpO2:  [95 %-100 %] 98 %  There is no height or weight on file to calculate BMI.     Input and Output Summary (last 24 hours):       Intake/Output Summary (Last 24 hours) at 2024 1846  Last data filed at 2024 1100  Gross per 24 hour   Intake 1120 ml   Output 1 ml   Net 1119 ml       Physical Exam:    Constitutional: Patient is oriented to self  and place, in no acute distress  HEENT:  Normocephalic, atraumatic, dry oral mucosa  Cardiovascular: Normal S1S2, irregular, No murmurs/rubs/gallops appreciated.  Pulmonary:  Bilateral air entry, No rhonchi/rales/wheezing appreciated  Abdominal: Soft, Bowel sounds present, Non-tender, Non-distended  Extremities:  No cyanosis, clubbing or edema.   Neurological: awake, alert  Skin:  Warm, dry    Additional Data:     Labs:    Results from last 7 days   Lab Units 08/03/24  0525   WBC Thousand/uL 4.90   HEMOGLOBIN g/dL 10.7*   HEMATOCRIT % 34.2*   PLATELETS Thousands/uL 80*   SEGS PCT % 78*   LYMPHO PCT % 14   MONO PCT % 5   EOS PCT % 1     Results from last 7 days   Lab Units 08/05/24  1244 08/02/24  1919 08/02/24  0626   POTASSIUM mmol/L 3.4*   < > 3.9   CHLORIDE mmol/L 107   < > 110*   CO2 mmol/L 25   < > 27   BUN mg/dL 32*   < > 29*   CREATININE mg/dL 2.49*   < > 1.48*   CALCIUM mg/dL 8.3*   < > 8.9   ALK PHOS U/L  --   --  93   ALT U/L  --   --  55*   AST U/L  --   --  47*    < > = values in this interval not displayed.     Results from last 7 days   Lab Units 08/02/24  2033   INR  2.08*        I Have Reviewed All Lab Data Listed Above.    Invasive Devices       Central Venous Catheter Line  Duration             CVC Central Lines 08/02/24 Triple 16cm 2 days              Peripheral Intravenous Line  Duration             Peripheral IV 08/05/24 Left;Ventral (anterior) Forearm <1 day              Drain  Duration             External Urinary Catheter 2 days                       Recent Cultures (last 7 days):           Last 24 Hours Medication List:   Current Facility-Administered Medications   Medication Dose Route Frequency Provider Last Rate    acetaminophen  975 mg Oral Q6H PRN AZIZA Escalera      apixaban  5 mg Oral BID AZIZA Escalera      bisacodyl  10 mg Rectal Daily PRN Vane Davis MD      dextrose  75 mL/hr Intravenous Continuous Vane Davis  MD 75 mL/hr (08/05/24 1241)    doxycycline hyclate  100 mg Oral BID Cathi Briseno, AZIZA      hydroxychloroquine  200 mg Oral BID Vane Davis MD      levalbuterol  1.25 mg Nebulization Q8H PRN Cathi Briseno, AZIZA      magnesium Oxide  400 mg Oral BID Cathi Briseno, CRNP      metoprolol tartrate  25 mg Oral Q12H FAMILIA Cathi Briseno, AZIZA      omeprazole (PRILOSEC) suspension 2 mg/mL  40 mg Oral Daily Cathi Briseno, AZIZA      ondansetron  4 mg Intravenous Q6H PRN Cathi Briseno, TYSHAWNNP      oxyCODONE  5 mg Oral Q6H PRN Cathi Briseno, AZIZA      polyethylene glycol  17 g Oral Daily PRN Cathi Briseno, TYSHAWNNP      predniSONE  5 mg Oral BID With Meals Cathi Briseno, AZIZA      simethicone  80 mg Oral 4x Daily PRN Cathi Briseno, AZIZA          Today, Patient Was Seen By: Leslie Chadwick MD

## 2024-08-05 NOTE — ASSESSMENT & PLAN NOTE
Patient was admitted 7/21-8/02 for sepsis, AMANDA, and afib RVR. During admission she was transferred to ICU for septic shock requring pressors. She had confusion suspected to be delirium. She returned back to baseline mental status and was d/c back to [place].  Returned to ED for dyspnea and hypoxia. 2/2 CHF vs aspiration event    O2 sat 70 to 80s improved with nonrebreather.  Transitioned to mid flow  CTA neg for PE. Mild pulmonary edema with small bibasilar pleural effusions (similar to previous imaging)  Given a dose of lasix  RESOLVED, comfortable on room air

## 2024-08-05 NOTE — PLAN OF CARE
Problem: Prexisting or High Potential for Compromised Skin Integrity  Goal: Skin integrity is maintained or improved  Description: INTERVENTIONS:  - Identify patients at risk for skin breakdown  - Assess and monitor skin integrity  - Assess and monitor nutrition and hydration status  - Monitor labs   - Assess for incontinence   - Turn and reposition patient  - Assist with mobility/ambulation  - Relieve pressure over bony prominences  - Avoid friction and shearing  - Provide appropriate hygiene as needed including keeping skin clean and dry  - Evaluate need for skin moisturizer/barrier cream  - Collaborate with interdisciplinary team   - Patient/family teaching  - Consider wound care consult   Outcome: Progressing     Problem: PAIN - ADULT  Goal: Verbalizes/displays adequate comfort level or baseline comfort level  Description: Interventions:  - Encourage patient to monitor pain and request assistance  - Assess pain using appropriate pain scale  - Administer analgesics based on type and severity of pain and evaluate response  - Implement non-pharmacological measures as appropriate and evaluate response  - Consider cultural and social influences on pain and pain management  - Notify physician/advanced practitioner if interventions unsuccessful or patient reports new pain  Outcome: Progressing     Problem: INFECTION - ADULT  Goal: Absence or prevention of progression during hospitalization  Description: INTERVENTIONS:  - Assess and monitor for signs and symptoms of infection  - Monitor lab/diagnostic results  - Monitor all insertion sites, i.e. indwelling lines, tubes, and drains  - Monitor endotracheal if appropriate and nasal secretions for changes in amount and color  - [place] appropriate cooling/warming therapies per order  - Administer medications as ordered  - Instruct and encourage patient and family to use good hand hygiene technique  - Identify and instruct in appropriate isolation precautions for  identified infection/condition  Outcome: Progressing  Goal: Absence of fever/infection during neutropenic period  Description: INTERVENTIONS:  - Monitor WBC    Outcome: Progressing     Problem: SAFETY ADULT  Goal: Maintain or return to baseline ADL function  Description: INTERVENTIONS:  - Educate patient/family on patient safety including physical limitations  - Instruct patient to call for assistance with activity   - Consult OT/PT to assist with strengthening/mobility   - Keep Call bell within reach  - Keep bed low and locked with side rails adjusted as appropriate  - Keep care items and personal belongings within reach  - Initiate and maintain comfort rounds  - Make Fall Risk Sign visible to staff  - Apply yellow socks and bracelet for high fall risk patients  - Consider moving patient to room near nurses station  Outcome: Progressing  Goal: Maintains/Returns to pre admission functional level  Description: INTERVENTIONS:  - Perform AM-PAC 6 Click Basic Mobility/ Daily Activity assessment daily.  - Set and communicate daily mobility goal to care team and patient/family/caregiver.   - Collaborate with rehabilitation services on mobility goals if consulted  - Perform Range of Motion 3 times a day.  - Reposition patient every 2 hours.  - Dangle patient 3 times a day  - Stand patient 3 times a day  - Ambulate patient 3 times a day  - Out of bed to chair 3 times a day   - Out of bed for meals 3 times a day  - Out of bed for toileting  - Record patient progress and toleration of activity level   Outcome: Progressing     Problem: DISCHARGE PLANNING  Goal: Discharge to home or other facility with appropriate resources  Description: INTERVENTIONS:  - Identify barriers to discharge w/patient and caregiver  - Arrange for needed discharge resources and transportation as appropriate  - Identify discharge learning needs (meds, wound care, etc.)  - Arrange for interpretive services to assist at discharge as needed  - Refer to  Case Management Department for coordinating discharge planning if the patient needs post-hospital services based on physician/advanced practitioner order or complex needs related to functional status, cognitive ability, or social support system  Outcome: Progressing     Problem: Knowledge Deficit  Goal: Patient/family/caregiver demonstrates understanding of disease process, treatment plan, medications, and discharge instructions  Description: Complete learning assessment and assess knowledge base.  Interventions:  - Provide teaching at level of understanding  - Provide teaching via preferred learning methods  Outcome: Progressing     Problem: Nutrition/Hydration-ADULT  Goal: Nutrient/Hydration intake appropriate for improving, restoring or maintaining nutritional needs  Description: Monitor and assess patient's nutrition/hydration status for malnutrition. Collaborate with interdisciplinary team and initiate plan and interventions as ordered.  Monitor patient's weight and dietary intake as ordered or per policy. Utilize nutrition screening tool and intervene as necessary. Determine patient's food preferences and provide high-protein, high-caloric foods as appropriate.     INTERVENTIONS:  - Monitor oral intake, urinary output, labs, and treatment plans  - Assess nutrition and hydration status and recommend course of action  - Evaluate amount of meals eaten  - Assist patient with eating if necessary   - Allow adequate time for meals  - Recommend/ encourage appropriate diets, oral nutritional supplements, and vitamin/mineral supplements  - Order, calculate, and assess calorie counts as needed  - Recommend, monitor, and adjust tube feedings and TPN/PPN based on assessed needs  - Assess need for intravenous fluids  - Provide specific nutrition/hydration education as appropriate  - Include patient/family/caregiver in decisions related to nutrition  Outcome: Progressing

## 2024-08-05 NOTE — PLAN OF CARE
Problem: Prexisting or High Potential for Compromised Skin Integrity  Goal: Skin integrity is maintained or improved  Description: INTERVENTIONS:  - Identify patients at risk for skin breakdown  - Assess and monitor skin integrity  - Assess and monitor nutrition and hydration status  - Monitor labs   - Assess for incontinence   - Turn and reposition patient  - Assist with mobility/ambulation  - Relieve pressure over bony prominences  - Avoid friction and shearing  - Provide appropriate hygiene as needed including keeping skin clean and dry  - Evaluate need for skin moisturizer/barrier cream  - Collaborate with interdisciplinary team   - Patient/family teaching  - Consider wound care consult   Outcome: Progressing     Problem: PAIN - ADULT  Goal: Verbalizes/displays adequate comfort level or baseline comfort level  Description: Interventions:  - Encourage patient to monitor pain and request assistance  - Assess pain using appropriate pain scale  - Administer analgesics based on type and severity of pain and evaluate response  - Implement non-pharmacological measures as appropriate and evaluate response  - Consider cultural and social influences on pain and pain management  - Notify physician/advanced practitioner if interventions unsuccessful or patient reports new pain  Outcome: Progressing     Problem: INFECTION - ADULT  Goal: Absence or prevention of progression during hospitalization  Description: INTERVENTIONS:  - Assess and monitor for signs and symptoms of infection  - Monitor lab/diagnostic results  - Monitor all insertion sites, i.e. indwelling lines, tubes, and drains  - Monitor endotracheal if appropriate and nasal secretions for changes in amount and color  - [place] appropriate cooling/warming therapies per order  - Administer medications as ordered  - Instruct and encourage patient and family to use good hand hygiene technique  - Identify and instruct in appropriate isolation precautions for  identified infection/condition  Outcome: Progressing  Goal: Absence of fever/infection during neutropenic period  Description: INTERVENTIONS:  - Monitor WBC    Outcome: Progressing     Problem: SAFETY ADULT  Goal: Maintain or return to baseline ADL function  Description: INTERVENTIONS:  - Educate patient/family on patient safety including physical limitations  - Instruct patient to call for assistance with activity   - Consult OT/PT to assist with strengthening/mobility   - Keep Call bell within reach  - Keep bed low and locked with side rails adjusted as appropriate  - Keep care items and personal belongings within reach  - Initiate and maintain comfort rounds  - Make Fall Risk Sign visible to staff  - Apply yellow socks and bracelet for high fall risk patients  - Consider moving patient to room near nurses station  Outcome: Progressing  Goal: Maintains/Returns to pre admission functional level  Description: INTERVENTIONS:  - Perform AM-PAC 6 Click Basic Mobility/ Daily Activity assessment daily.  - Set and communicate daily mobility goal to care team and patient/family/caregiver.   - Collaborate with rehabilitation services on mobility goals if consulted  - Perform Range of Motion 3 times a day.  - Reposition patient every 2 hours.  - Dangle patient 3 times a day  - Stand patient 3 times a day  - Ambulate patient 3 times a day  - Out of bed to chair 3 times a day   - Out of bed for meals 3 times a day  - Out of bed for toileting  - Record patient progress and toleration of activity level   Outcome: Progressing     Problem: DISCHARGE PLANNING  Goal: Discharge to home or other facility with appropriate resources  Description: INTERVENTIONS:  - Identify barriers to discharge w/patient and caregiver  - Arrange for needed discharge resources and transportation as appropriate  - Identify discharge learning needs (meds, wound care, etc.)  - Arrange for interpretive services to assist at discharge as needed  - Refer to  Case Management Department for coordinating discharge planning if the patient needs post-hospital services based on physician/advanced practitioner order or complex needs related to functional status, cognitive ability, or social support system  Outcome: Progressing     Problem: Knowledge Deficit  Goal: Patient/family/caregiver demonstrates understanding of disease process, treatment plan, medications, and discharge instructions  Description: Complete learning assessment and assess knowledge base.  Interventions:  - Provide teaching at level of understanding  - Provide teaching via preferred learning methods  Outcome: Progressing     Problem: Nutrition/Hydration-ADULT  Goal: Nutrient/Hydration intake appropriate for improving, restoring or maintaining nutritional needs  Description: Monitor and assess patient's nutrition/hydration status for malnutrition. Collaborate with interdisciplinary team and initiate plan and interventions as ordered.  Monitor patient's weight and dietary intake as ordered or per policy. Utilize nutrition screening tool and intervene as necessary. Determine patient's food preferences and provide high-protein, high-caloric foods as appropriate.     INTERVENTIONS:  - Monitor oral intake, urinary output, labs, and treatment plans  - Assess nutrition and hydration status and recommend course of action  - Evaluate amount of meals eaten  - Assist patient with eating if necessary   - Allow adequate time for meals  - Recommend/ encourage appropriate diets, oral nutritional supplements, and vitamin/mineral supplements  - Order, calculate, and assess calorie counts as needed  - Recommend, monitor, and adjust tube feedings and TPN/PPN based on assessed needs  - Assess need for intravenous fluids  - Provide specific nutrition/hydration education as appropriate  - Include patient/family/caregiver in decisions related to nutrition  Outcome: Progressing

## 2024-08-05 NOTE — ASSESSMENT & PLAN NOTE
Lab Results   Component Value Date    EGFR 18 08/05/2024    EGFR 18 08/05/2024    EGFR 19 08/05/2024    CREATININE 2.49 (H) 08/05/2024    CREATININE 2.47 (H) 08/05/2024    CREATININE 2.33 (H) 08/05/2024     CKD 3   Creatinine bumped up, likely prerenal given patient's refusal to eat or drink in the last 24 hours  Gentle IVF hydration started for AMANDA and hypernatremia  Lasix held, reassess volume status in am  Continue to monitor BMP, avoid nephrotoxins, monitor I&Os

## 2024-08-05 NOTE — NURSING NOTE
Assumed care of this pt at this time,pt resting in bed, respiration non-labored,call bel within reach and  bed alarm activated.

## 2024-08-05 NOTE — CASE MANAGEMENT
Case Management Assessment & Discharge Planning Note    Patient name Bhavna Al  Location [facility] /E4 -* MRN [id number]  : 1950 Date 2024       Current Admission Date: 2024  Current Admission Diagnosis:Acute respiratory failure with hypoxia (HCC)   Patient Active Problem List    Diagnosis Date Noted Date Diagnosed    Acute respiratory failure with hypoxia (HCC) 2024     Chronic anemia 2024     Hypomagnesemia 2024     Pain and swelling of left upper extremity 2024     Goals of care, counseling/discussion 2024     encephalopathy 2024     Elevated troponin 2024     Sepsis ([facility]) 2024     Acute kidney injury superimposed on chronic kidney disease  ([facility]) 2024     Hypokalemia 2024     Chronic pruritic rash in adult 2024     Secondary adrenal insufficiency ([facility]) 2024     Pressure ulcer of left buttock, stage 3 ([facility]) 2024     Acute osteomyelitis of thoracic spine ([facility]) 2024     MRSA bacteremia 2024     Hypotension 2024     Dysphagia 2024     Deep tissue injury 2024     Hypernatremia 2024     Localized swelling on left hand 2023     Acute renal failure superimposed on stage 3 chronic kidney disease ([facility]) 2023     Febrile illness 2023     Dermatitis associated with incontinence 2023     Right shoulder pain 12/15/2022     Abnormal urinalysis 2022     Ambulatory dysfunction 2022     Hyperuricemia 2022     Acute metabolic encephalopathy 2022     Acute on chronic diastolic heart failure, LVEF 65%, LVIDd 3.3 cm, AHA Stage C 2022     Acute bronchitis 2022     Assistance needed with transportation 09/15/2022     Abnormal CT of the chest 2022     Gram-positive cocci bacteremia 2022     Psoriasis 2022     COVID-19 2022     Shock ([facility]) 01/10/2022     Persistent atrial fibrillation 01/10/2022      Hyperparathyroidism (HCC) 12/02/2021     Murmur, cardiac 12/02/2021     BPPV (benign paroxysmal positional vertigo) 07/11/2018     Seasonal allergic rhinitis due to pollen 07/11/2018     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     Osteoporosis 12/05/2016     Leukopenia 08/23/2016     Rheumatoid arthritis ([facility]) 08/23/2016     GERD (gastroesophageal reflux disease) 08/23/2016     Sarcoid 08/23/2016     Morbid obesity ([facility]) 07/12/2016     Vitamin D deficiency 07/16/2015     Essential hypertension 12/04/2014     Lumbar radiculopathy 12/04/2014       LOS (days): 2  Geometric Mean LOS (GMLOS) (days):   Days to GMLOS:     OBJECTIVE:  PATIENT READMITTED TO HOSPITAL  Risk of Unplanned Readmission Score: 41.64         Current admission status: Inpatient       Preferred Pharmacy:   [id number] - [place], [place] - [address]  [address]  [place] [address]  Phone: [phone] Fax: [phone]    [facility] - [address] [address]  Phone: [phone] Fax: [phone]    Primary Care Provider: Nya Taveras,     Primary Insurance: MEDICARE  Secondary Insurance: [facility]    ASSESSMENT:  Active Health Care Proxies       Mikaela Rhoda [facility] Representative - Child   Primary Phone: [phone] (Home)                 Advance Directives  Does patient have a Health Care POA?: No  Was patient offered paperwork?: No  Does patient currently have a Health Care decision maker?: Yes, please see Health Care Proxy section  Does patient have Advance Directives?: No  Was patient offered paperwork?: No  Primary Contact: Teresa Al (daughter) [phone]    Readmission Root Cause  30 Day Readmission: Yes  Who directed you to return to the hospital?: Other (comment) (Facility: [place])  Did you understand whom to contact if you had questions or problems?: Yes  Did you get your prescriptions before you left  the hospital?: Yes  Were you able to get your prescriptions filled when you left the hospital?: Yes  Did you take your medications as prescribed?: Yes  Were you able to get to your follow-up appointments?: Yes  During previous admission, was a post-acute recommendation made?: No  Patient was readmitted due to: altered mental status, hypoxia, dyspnea  Action Plan: PO lasix, monito I&O's, DCP back to facility once medically stable    Patient Information  Admitted from:: Facility ([place])  Mental Status: Alert  During Assessment patient was accompanied by: Other-Comment (Pt asleep when CM went to meet. CM called dtr and granddtr. Spoke to granddtr)  Assessment information provided by:: Other - please comment (Joe Fairchild and chart review)  Primary Caregiver: Other (Comment) (facility staff)  Caregiver's Name:: Rhoda Al (dtr), [place] Staff  Caregiver's Relationship to Patient:: Family Member  Caregiver's Telephone Number:: [phone],  Support Systems: Daughter, Family members, Other (Comment) (facility staff)  County of Residence: [place]  What [facility] do you live in?: [place]  Home entry access options. Select all that apply.: Elevator  Type of Current Residence: Facility  Upon entering residence, is there a bedroom on the main floor (no further steps)?: Yes  Upon entering residence, is there a bathroom on the main floor (no further steps)?: Yes  Living Arrangements: Other (Comment) ([place] for LTC)  Is patient a ?: No    Activities of Daily Living Prior to Admission  Functional Status: Total dependent  Completes ADLs independently?: No  Level of ADL dependence: Total Dependent  Ambulates independently?: No  Level of ambulatory dependence: Total Dependent  Does patient use assisted devices?: Yes  Assisted Devices (DME) used: Wheelchair, Mirian lift  Does patient currently own DME?: Yes  What DME does the patient currently own?: Mirian lift, Wheelchair  Does patient have a history of Outpatient Therapy  (PT/OT)?: No  Does the patient have a history of Short-Term Rehab?: No  Does patient have a history of HHC?: No  Does patient currently have HHC?: No    Patient Information Continued  Income Source: SSI/SSD  Does patient have prescription coverage?: Yes  Does patient receive dialysis treatments?: No  Does patient have a history of substance abuse?: No  Does patient have a history of Mental Health Diagnosis?: No    Means of Transportation  [facility] Other (Comment) (facility)      Social Determinants of Health (SDOH)      Flowsheet Row Most Recent Value   Housing Stability    In the last 12 months, was there a time when you were not able to pay the mortgage or rent on time? N   In the past 12 months, how many times have you moved where you were living? 1   At any time in the past 12 months, were you homeless or living in a shelter (including now)? N   Transportation Needs    In the past 12 months, has lack of transportation kept you from medical appointments or from getting medications? no   In the past 12 months, has lack of transportation kept you from meetings, work, or from getting things needed for daily living? No   Food Insecurity    Within the past 12 months, you worried that your food would run out before you got the money to buy more. Never true   Within the past 12 months, the food you bought just didn't last and you didn't have money to get more. Never true   Utilities    In the past 12 months has the electric, gas, oil, or water company threatened to shut off services in your home? No            DISCHARGE DETAILS:    Discharge planning discussed with:: Pts vicenta Fairchild  Freedom of Choice: Yes  Comments - Freedom of Choice: Pt will return to [place] when ready for discharge     Contacts  Patient Contacts: Rhoda Al  Relationship to Patient:: Family  Contact Method: Phone  Phone Number: [phone]  Reason/Outcome: Emergency Contact, Continuity of Care, Discharge  Planning    Requested Home Health Care         Is the patient interested in HHC at discharge?: No    DME Referral Provided  Referral made for DME?: No    Other Referral/Resources/Interventions Provided:  Interventions: Facility Return  Referral Comments: Referral sent in [facility] for Pt to return to [facility]    Treatment Team Recommendation: SNF  Discharge Destination Plan:: SNF  Transport at Discharge : [facility]     Additional Comments: CM went to meet Pt in room but Pt was sleeping. CM called Pts Philip Duong and left a VM. CM then called Pts Jeanie Fairchild and was able to speak with her. CM introduced self and role to jeanie. Pt was discharged from [facility] on 8/2/24 back to [facility] and returned the same day for altered mental status. Pt is LTC at [facility] and granddtr confirmed Pt will return to [facility] when ready for discharge. Referral submitted through [facility] for Pt to return to [facility]. CM dept will continue to follow through discharge.

## 2024-08-06 ENCOUNTER — APPOINTMENT (INPATIENT)
Dept: CT IMAGING | Facility: HOSPITAL | Age: 74
DRG: 291 | End: 2024-08-06
Payer: MEDICARE

## 2024-08-06 ENCOUNTER — APPOINTMENT (INPATIENT)
Dept: RADIOLOGY | Facility: HOSPITAL | Age: 74
DRG: 291 | End: 2024-08-06
Payer: MEDICARE

## 2024-08-06 ENCOUNTER — TELEPHONE (OUTPATIENT)
Dept: PSYCHIATRY | Facility: CLINIC | Age: 74
End: 2024-08-06

## 2024-08-06 LAB
ABO GROUP BLD: NORMAL
ABO GROUP BLD: NORMAL
ANION GAP SERPL CALCULATED.3IONS-SCNC: 11 MMOL/L (ref 4–13)
ANION GAP SERPL CALCULATED.3IONS-SCNC: 12 MMOL/L (ref 4–13)
ARTERIAL PATENCY WRIST A: YES
BASE EXCESS BLDA CALC-SCNC: -3.1 MMOL/L
BASOPHILS # BLD AUTO: 0.02 THOUSANDS/ÂΜL (ref 0–0.1)
BASOPHILS NFR BLD AUTO: 0 % (ref 0–1)
BLD GP AB SCN SERPL QL: NEGATIVE
BUN SERPL-MCNC: 31 MG/DL (ref 5–25)
BUN SERPL-MCNC: 31 MG/DL (ref 5–25)
CALCIUM SERPL-MCNC: 8.4 MG/DL (ref 8.4–10.2)
CALCIUM SERPL-MCNC: 8.5 MG/DL (ref 8.4–10.2)
CHLORIDE SERPL-SCNC: 105 MMOL/L (ref 96–108)
CHLORIDE SERPL-SCNC: 106 MMOL/L (ref 96–108)
CO2 SERPL-SCNC: 22 MMOL/L (ref 21–32)
CO2 SERPL-SCNC: 22 MMOL/L (ref 21–32)
CREAT SERPL-MCNC: 2.57 MG/DL (ref 0.6–1.3)
CREAT SERPL-MCNC: 2.63 MG/DL (ref 0.6–1.3)
EOSINOPHIL # BLD AUTO: 0.1 THOUSAND/ÂΜL (ref 0–0.61)
EOSINOPHIL NFR BLD AUTO: 2 % (ref 0–6)
ERYTHROCYTE [DISTWIDTH] IN BLOOD BY AUTOMATED COUNT: 19.1 % (ref 11.6–15.1)
FERRITIN SERPL-MCNC: 297 NG/ML (ref 11–307)
GFR SERPL CREATININE-BSD FRML MDRD: 17 ML/MIN/1.73SQ M
GFR SERPL CREATININE-BSD FRML MDRD: 17 ML/MIN/1.73SQ M
GLUCOSE SERPL-MCNC: 75 MG/DL (ref 65–140)
GLUCOSE SERPL-MCNC: 83 MG/DL (ref 65–140)
GLUCOSE SERPL-MCNC: 92 MG/DL (ref 65–140)
GLUCOSE SERPL-MCNC: 94 MG/DL (ref 65–140)
HCO3 BLDA-SCNC: 19.8 MMOL/L (ref 22–28)
HCT VFR BLD AUTO: 24.4 % (ref 34.8–46.1)
HCT VFR BLD AUTO: 25.2 % (ref 34.8–46.1)
HGB BLD-MCNC: 7.6 G/DL (ref 11.5–15.4)
HGB BLD-MCNC: 7.9 G/DL (ref 11.5–15.4)
IMM GRANULOCYTES # BLD AUTO: 0.05 THOUSAND/UL (ref 0–0.2)
IMM GRANULOCYTES NFR BLD AUTO: 1 % (ref 0–2)
IRON SERPL-MCNC: 84 UG/DL (ref 50–212)
LYMPHOCYTES # BLD AUTO: 1.13 THOUSANDS/ÂΜL (ref 0.6–4.47)
LYMPHOCYTES NFR BLD AUTO: 17 % (ref 14–44)
MCH RBC QN AUTO: 28.5 PG (ref 26.8–34.3)
MCHC RBC AUTO-ENTMCNC: 31.3 G/DL (ref 31.4–37.4)
MCV RBC AUTO: 91 FL (ref 82–98)
MONOCYTES # BLD AUTO: 0.35 THOUSAND/ÂΜL (ref 0.17–1.22)
MONOCYTES NFR BLD AUTO: 5 % (ref 4–12)
NEUTROPHILS # BLD AUTO: 4.97 THOUSANDS/ÂΜL (ref 1.85–7.62)
NEUTS SEG NFR BLD AUTO: 75 % (ref 43–75)
NON VENT ROOM AIR: ABNORMAL %
NRBC BLD AUTO-RTO: 1 /100 WBCS
O2 CT BLDA-SCNC: 12.6 ML/DL (ref 16–23)
OXYHGB MFR BLDA: 97.2 % (ref 94–97)
PCO2 BLDA: 28 MM HG (ref 36–44)
PH BLDA: 7.47 [PH] (ref 7.35–7.45)
PLATELET # BLD AUTO: 95 THOUSANDS/UL (ref 149–390)
PO2 BLDA: 102.7 MM HG (ref 75–129)
POTASSIUM SERPL-SCNC: 3.4 MMOL/L (ref 3.5–5.3)
POTASSIUM SERPL-SCNC: 3.4 MMOL/L (ref 3.5–5.3)
RBC # BLD AUTO: 2.77 MILLION/UL (ref 3.81–5.12)
RH BLD: POSITIVE
RH BLD: POSITIVE
SODIUM SERPL-SCNC: 138 MMOL/L (ref 135–147)
SODIUM SERPL-SCNC: 140 MMOL/L (ref 135–147)
SPECIMEN EXPIRATION DATE: NORMAL
SPECIMEN SOURCE: ABNORMAL
UIBC SERPL-MCNC: <55 UG/DL (ref 155–355)
WBC # BLD AUTO: 6.62 THOUSAND/UL (ref 4.31–10.16)

## 2024-08-06 PROCEDURE — 83540 ASSAY OF IRON: CPT | Performed by: PHYSICIAN ASSISTANT

## 2024-08-06 PROCEDURE — 85018 HEMOGLOBIN: CPT | Performed by: INTERNAL MEDICINE

## 2024-08-06 PROCEDURE — 82728 ASSAY OF FERRITIN: CPT | Performed by: PHYSICIAN ASSISTANT

## 2024-08-06 PROCEDURE — 80048 BASIC METABOLIC PNL TOTAL CA: CPT | Performed by: STUDENT IN AN ORGANIZED HEALTH CARE EDUCATION/TRAINING PROGRAM

## 2024-08-06 PROCEDURE — 99233 SBSQ HOSP IP/OBS HIGH 50: CPT | Performed by: INTERNAL MEDICINE

## 2024-08-06 PROCEDURE — 86850 RBC ANTIBODY SCREEN: CPT | Performed by: INTERNAL MEDICINE

## 2024-08-06 PROCEDURE — 74018 RADEX ABDOMEN 1 VIEW: CPT

## 2024-08-06 PROCEDURE — NC001 PR NO CHARGE: Performed by: INTERNAL MEDICINE

## 2024-08-06 PROCEDURE — 71046 X-RAY EXAM CHEST 2 VIEWS: CPT

## 2024-08-06 PROCEDURE — 36600 WITHDRAWAL OF ARTERIAL BLOOD: CPT

## 2024-08-06 PROCEDURE — 86900 BLOOD TYPING SEROLOGIC ABO: CPT | Performed by: INTERNAL MEDICINE

## 2024-08-06 PROCEDURE — 86923 COMPATIBILITY TEST ELECTRIC: CPT

## 2024-08-06 PROCEDURE — 82948 REAGENT STRIP/BLOOD GLUCOSE: CPT

## 2024-08-06 PROCEDURE — 82805 BLOOD GASES W/O2 SATURATION: CPT | Performed by: INTERNAL MEDICINE

## 2024-08-06 PROCEDURE — 85014 HEMATOCRIT: CPT | Performed by: INTERNAL MEDICINE

## 2024-08-06 PROCEDURE — 70450 CT HEAD/BRAIN W/O DYE: CPT

## 2024-08-06 PROCEDURE — P9040 RBC LEUKOREDUCED IRRADIATED: HCPCS

## 2024-08-06 PROCEDURE — 83550 IRON BINDING TEST: CPT | Performed by: PHYSICIAN ASSISTANT

## 2024-08-06 PROCEDURE — 86901 BLOOD TYPING SEROLOGIC RH(D): CPT | Performed by: INTERNAL MEDICINE

## 2024-08-06 PROCEDURE — 85025 COMPLETE CBC W/AUTO DIFF WBC: CPT | Performed by: INTERNAL MEDICINE

## 2024-08-06 PROCEDURE — 99223 1ST HOSP IP/OBS HIGH 75: CPT | Performed by: INTERNAL MEDICINE

## 2024-08-06 RX ORDER — POTASSIUM CHLORIDE 20 MEQ/1
40 TABLET, EXTENDED RELEASE ORAL ONCE
Status: DISCONTINUED | OUTPATIENT
Start: 2024-08-06 | End: 2024-08-06

## 2024-08-06 RX ORDER — DEXTROSE MONOHYDRATE 50 MG/ML
75 INJECTION, SOLUTION INTRAVENOUS CONTINUOUS
Status: DISCONTINUED | OUTPATIENT
Start: 2024-08-06 | End: 2024-08-06

## 2024-08-06 RX ORDER — POTASSIUM CHLORIDE 14.9 MG/ML
20 INJECTION INTRAVENOUS ONCE
Status: COMPLETED | OUTPATIENT
Start: 2024-08-06 | End: 2024-08-06

## 2024-08-06 RX ORDER — ALBUMIN (HUMAN) 12.5 G/50ML
25 SOLUTION INTRAVENOUS ONCE
Status: COMPLETED | OUTPATIENT
Start: 2024-08-06 | End: 2024-08-06

## 2024-08-06 RX ORDER — DEXTROSE MONOHYDRATE 50 MG/ML
50 INJECTION, SOLUTION INTRAVENOUS CONTINUOUS
Status: DISCONTINUED | OUTPATIENT
Start: 2024-08-06 | End: 2024-08-07

## 2024-08-06 RX ORDER — MIDODRINE HYDROCHLORIDE 5 MG/1
5 TABLET ORAL
Status: DISCONTINUED | OUTPATIENT
Start: 2024-08-06 | End: 2024-08-07

## 2024-08-06 RX ADMIN — POTASSIUM CHLORIDE 20 MEQ: 200 INJECTION, SOLUTION INTRAVENOUS at 12:21

## 2024-08-06 RX ADMIN — ONDANSETRON 4 MG: 2 INJECTION INTRAMUSCULAR; INTRAVENOUS at 16:52

## 2024-08-06 RX ADMIN — ALBUMIN (HUMAN) 25 G: 0.25 INJECTION, SOLUTION INTRAVENOUS at 13:21

## 2024-08-06 RX ADMIN — DEXTROSE 50 ML/HR: 5 SOLUTION INTRAVENOUS at 18:24

## 2024-08-06 RX ADMIN — HYDROCORTISONE SODIUM SUCCINATE 20 MG: 100 INJECTION, POWDER, FOR SOLUTION INTRAMUSCULAR; INTRAVENOUS at 23:20

## 2024-08-06 RX ADMIN — ALBUMIN (HUMAN) 25 G: 0.25 INJECTION, SOLUTION INTRAVENOUS at 11:20

## 2024-08-06 RX ADMIN — DEXTROSE 75 ML/HR: 5 SOLUTION INTRAVENOUS at 01:38

## 2024-08-06 RX ADMIN — HYDROCORTISONE SODIUM SUCCINATE 20 MG: 100 INJECTION, POWDER, FOR SOLUTION INTRAMUSCULAR; INTRAVENOUS at 14:30

## 2024-08-06 RX ADMIN — DOXYCYCLINE 100 MG: 100 INJECTION, POWDER, LYOPHILIZED, FOR SOLUTION INTRAVENOUS at 14:24

## 2024-08-06 NOTE — PROGRESS NOTES
[facility]  Progress Note  Name: Bhavna Al I  MRN: [id number]  Unit/Bed#: E4 -01 I Date of Admission: 8/2/2024   Date of Service: 8/6/2024 I Hospital Day: 3    Assessment & Plan   * Acute respiratory failure with hypoxia (HCC)  Assessment & Plan  Patient was admitted 7/21-8/02 for sepsis, AMANDA, and afib RVR. During admission she was transferred to ICU for septic shock requring pressors. She had confusion suspected to be delirium. She returned back to baseline mental status and was d/c back to [place].  Returned to ED for dyspnea and hypoxia. 2/2 CHF vs aspiration event    O2 sat 70 to 80s improved with nonrebreather.  Transitioned to mid flow  CTA neg for PE. Mild pulmonary edema with small bibasilar pleural effusions (similar to previous imaging)  Given a dose of lasix  RESOLVED, comfortable on room air    Hypotension  Assessment & Plan  Patient with hypotension likely secondary to volume depletion  Discussed with nephrology and critical care team  Critical care recommended to continue with IV fluids and monitor blood pressure closely  Insert NG tube and start tube feeding  Several discussions with patient's family regarding goals of care at this point daughter wants to proceed with level 1 full code  Family will continue to have goals of care discussion with the patient    Dysphagia  Assessment & Plan  Continue purée with thin liquids per SLP note on 8/01  Aspiration precautions  Patient with overall poor p.o. intake has been refusing to eat or take her medications  Place NGT and start tube feeding  Continue family discussions regarding PEG tube    Hypomagnesemia  Assessment & Plan  Mg 1.6.  Replete.  Continue to monitor    Chronic anemia  Assessment & Plan  Acute on chronic anemia without any evidence of bleeding  Hemoglobin 7.6  Discussed with patient's daughter-given hypotension, and symptomatic anemia plan to transfuse 1 unit PRBC  Monitor H&H    Pain and swelling of left  upper extremity  Assessment & Plan  Persistent left upper extremity swelling thought to be 2/2 superficial thrombophlebitis vs dependent edema vs swelling from IV line  Venous duplex 8/01 negative for DVT:  Technically difficult/limited study. The patient refused to move her arm. Unable to visualize the brachial, cephalic, and basilic veins. Unable to visualize the forearm.  Continue supportive treatment with rest, ice and elevation  PRN Analgesics  Neurovascular checks    Elevated troponin  Assessment & Plan  Continues with troponin elevation thought to be secondary to sepsis and AMANDA  Hypoxic respiratory failure also likely contributing    Persistent atrial fibrillation  Assessment & Plan  Rate control: Metoprolol 25 mg BID  Anticoagulation: Eliquis 5mg BID    MRSA bacteremia  Assessment & Plan  On suppressive antibiotic therapy with oral doxycycline 100mg BID for vertebral osteomyelitis. Per ID: planned prolonged course ideally until inflammatory markers normalized/plateau     Acute osteomyelitis of thoracic spine (HCC)  Assessment & Plan  On suppressive antibiotic therapy with oral doxycycline for sacral osteomyelitis with MRSA     Hypernatremia  Assessment & Plan  Hypernatremic to 150 this am with AMANDA in setting of refusal to take anything po over 24 hours  Continue IV fluids  Encouraged po intake    Acute renal failure superimposed on stage 3 chronic kidney disease (HCC)  Assessment & Plan  Lab Results   Component Value Date    EGFR 17 08/06/2024    EGFR 17 08/06/2024    EGFR 18 08/05/2024    CREATININE 2.63 (H) 08/06/2024    CREATININE 2.57 (H) 08/06/2024    CREATININE 2.49 (H) 08/05/2024     Acute kidney injury likely secondary to hypotension, recent contrast, poor p.o. intake, hypoalbuminemia  Patient has been refusing to eat or take her medications  Nephrology input and recommendations appreciated  Continue IV fluids  Albumin 25 g IV every 6 hours for 2 doses  Diuretics on hold  Continue to monitor BMP,  avoid nephrotoxins, monitor I&Os    Acute metabolic encephalopathy  Assessment & Plan  Presents again with encephalopathy.  In setting of hypoxia  Recent admission for encephalopathy, was obtunded requiring transfer to ICU, thought to be secondary to worsening hypoactive delirium  CTH negative for acute intracranial abnormalities  RESOLVED    Acute on chronic diastolic heart failure, LVEF 65%, LVIDd 3.3 cm, AHA Stage C  Assessment & Plan  Wt Readings from Last 3 Encounters:   08/06/24 94.1 kg (207 lb 7.3 oz)   08/02/24 95 kg (209 lb 7 oz)   07/11/24 108 kg (238 lb)     Preserved EF >75%. BNP >400  Presented to ED with hypoxia 70 to 80s improved with mid flow  CTA showing mild pulmonary edema with small bibasilar pleural effusions and cardiomegaly  Received 1 dose of IV furosemide 40 mg in the ED   Diuretics on hold given poor p.o. intake and hypotension  Monitor daily weight, I&O  Low Na diet with fluid restriction    Rheumatoid arthritis (HCC)  Assessment & Plan  Continue prednisone 5 mg BID  Continue Plaquenil 200 mg daily             Mobility:  Basic Mobility Inpatient Raw Score: 6  JH-HLM Goal: 2: Bed activities/Dependent transfer  JH-HLM Achieved: 2: Bed activities/Dependent transfer  JH-HLM Goal achieved. Continue to encourage appropriate mobility.    VTE Pharmacologic Prophylaxis:   Pharmacologic: eliquis    Patient Centered Rounds: I have performed bedside rounds with nursing staff today.    Discussions with Specialists or Other Care Team Provider: Nephrology, critical care    Education and Discussions with Family / Patient: Daughter Rhoda and granddaughter Shantel    Time Spent for Care:   More than 50% of total time spent on counseling and coordination of care as described above.    Current Length of Stay: 3 day(s)    Current Patient Status: Inpatient   Certification Statement: The patient will continue to require additional inpatient hospital stay due to hypotension, acute kidney injury, poor p.o.  intake    Discharge Plan / Estimated Discharge Date: TBD    Code Status: Level 1 - Full Code      Subjective:   Patient seen and examined at bedside, large, minimally verbal does track and respond with simple yes, denies any pain    Objective:     Vitals:   Temp (24hrs), Av.3 °F (36.3 °C), Min:96.5 °F (35.8 °C), Max:97.9 °F (36.6 °C)    Temp:  [96.5 °F (35.8 °C)-97.9 °F (36.6 °C)] 97.4 °F (36.3 °C)  HR:  [87-98] 91  Resp:  [16-34] 32  BP: ()/(35-60) 102/58  SpO2:  [91 %-100 %] 91 %  Body mass index is 34.52 kg/m².     Input and Output Summary (last 24 hours):       Intake/Output Summary (Last 24 hours) at 2024 1733  Last data filed at 2024 0800  Gross per 24 hour   Intake 120 ml   Output 200 ml   Net -80 ml       Physical Exam:    Constitutional: Patient is in no acute distress, ill-appearing  HEENT:  Normocephalic, atraumatic, dry oral mucosa  Cardiovascular: Normal S1S2, RRR, No murmurs/rubs/gallops appreciated.  Pulmonary:  Bilateral air entry, No rhonchi/rales/wheezing appreciated  Abdominal: Soft, Bowel sounds present, Non-tender, Non-distended  Extremities:  No cyanosis, clubbing or edema.   Neurological: Awake, alert, minimal verbal communication  Skin: Pallor    Additional Data:     Labs:    Results from last 7 days   Lab Units 24  0800 24  0454   WBC Thousand/uL  --  6.62   HEMOGLOBIN g/dL 7.6* 7.9*   HEMATOCRIT % 24.4* 25.2*   PLATELETS Thousands/uL  --  95*   SEGS PCT %  --  75   LYMPHO PCT %  --  17   MONO PCT %  --  5   EOS PCT %  --  2     Results from last 7 days   Lab Units 24  0454 24  1919 24  0626   POTASSIUM mmol/L 3.4*   < > 3.9   CHLORIDE mmol/L 105   < > 110*   CO2 mmol/L 22   < > 27   BUN mg/dL 31*   < > 29*   CREATININE mg/dL 2.63*   < > 1.48*   CALCIUM mg/dL 8.5   < > 8.9   ALK PHOS U/L  --   --  93   ALT U/L  --   --  55*   AST U/L  --   --  47*    < > = values in this interval not displayed.     Results from last 7 days   Lab Units  08/02/24 2033   INR  2.08*        I Have Reviewed All Lab Data Listed Above.    Invasive Devices       Central Venous Catheter Line  Duration             CVC Central Lines 08/02/24 Triple 16cm 3 days              Peripheral Intravenous Line  Duration             Peripheral IV 08/05/24 Left;Ventral (anterior) Forearm 1 day              Drain  Duration             External Urinary Catheter 3 days                         Recent Cultures (last 7 days):           Last 24 Hours Medication List:   Current Facility-Administered Medications   Medication Dose Route Frequency Provider Last Rate    acetaminophen  975 mg Oral Q6H PRN AZIZA Escalera      apixaban  5 mg Oral BID AZIZA Escalera      bisacodyl  10 mg Rectal Daily PRN Vane Davis MD      dextrose  50 mL/hr Intravenous Continuous Yoselynnaga Carr MD 50 mL/hr (08/06/24 1233)    doxycycline  100 mg Intravenous Q12H Leslie Chadwick  mg (08/06/24 1424)    hydrocortisone sodium succinate  20 mg Intravenous Q12H [facility] Leslie Chadwick MD      hydroxychloroquine  200 mg Oral BID Vane Davis MD      levalbuterol  1.25 mg Nebulization Q8H PRN AZIZA Escalera      magnesium Oxide  400 mg Oral BID AZIZA Escalera      metoprolol tartrate  25 mg Oral Q12H [facility] AZIZA Escalera      midodrine  5 mg Oral TID  Marti Alfaro PA-C      omeprazole (PRILOSEC) suspension 2 mg/mL  40 mg Oral Daily AZIZA Escalera      ondansetron  4 mg Intravenous Q6H PRN AZIZA Escalera      oxyCODONE  5 mg Oral Q6H PRN AZIZA Escalera      polyethylene glycol  17 g Oral Daily PRN AZIZA Escalera      simethicone  80 mg Oral 4x Daily PRN AZIZA Escalera          Today, Patient Was Seen By: Leslie Chadwick MD

## 2024-08-06 NOTE — PLAN OF CARE
Problem: Prexisting or High Potential for Compromised Skin Integrity  Goal: Skin integrity is maintained or improved  Description: INTERVENTIONS:  - Identify patients at risk for skin breakdown  - Assess and monitor skin integrity  - Assess and monitor nutrition and hydration status  - Monitor labs   - Assess for incontinence   - Turn and reposition patient  - Assist with mobility/ambulation  - Relieve pressure over bony prominences  - Avoid friction and shearing  - Provide appropriate hygiene as needed including keeping skin clean and dry  - Evaluate need for skin moisturizer/barrier cream  - Collaborate with interdisciplinary team   - Patient/family teaching  - Consider wound care consult   Outcome: Progressing     Problem: PAIN - ADULT  Goal: Verbalizes/displays adequate comfort level or baseline comfort level  Description: Interventions:  - Encourage patient to monitor pain and request assistance  - Assess pain using appropriate pain scale  - Administer analgesics based on type and severity of pain and evaluate response  - Implement non-pharmacological measures as appropriate and evaluate response  - Consider cultural and social influences on pain and pain management  - Notify physician/advanced practitioner if interventions unsuccessful or patient reports new pain  Outcome: Progressing     Problem: INFECTION - ADULT  Goal: Absence or prevention of progression during hospitalization  Description: INTERVENTIONS:  - Assess and monitor for signs and symptoms of infection  - Monitor lab/diagnostic results  - Monitor all insertion sites, i.e. indwelling lines, tubes, and drains  - Monitor endotracheal if appropriate and nasal secretions for changes in amount and color  - [place] appropriate cooling/warming therapies per order  - Administer medications as ordered  - Instruct and encourage patient and family to use good hand hygiene technique  - Identify and instruct in appropriate isolation precautions for  identified infection/condition  Outcome: Progressing  Goal: Absence of fever/infection during neutropenic period  Description: INTERVENTIONS:  - Monitor WBC    Outcome: Progressing     Problem: SAFETY ADULT  Goal: Maintain or return to baseline ADL function  Description: INTERVENTIONS:  - Educate patient/family on patient safety including physical limitations  - Instruct patient to call for assistance with activity   - Consult OT/PT to assist with strengthening/mobility   - Keep Call bell within reach  - Keep bed low and locked with side rails adjusted as appropriate  - Keep care items and personal belongings within reach  - Initiate and maintain comfort rounds  - Make Fall Risk Sign visible to staff  - Apply yellow socks and bracelet for high fall risk patients  - Consider moving patient to room near nurses station  Outcome: Progressing  Goal: Maintains/Returns to pre admission functional level  Description: INTERVENTIONS:  - Perform AM-PAC 6 Click Basic Mobility/ Daily Activity assessment daily.  - Set and communicate daily mobility goal to care team and patient/family/caregiver.   - Collaborate with rehabilitation services on mobility goals if consulted  - Perform Range of Motion 3 times a day.  - Reposition patient every 2 hours.  - Dangle patient 3 times a day  - Stand patient 3 times a day  - Ambulate patient 3 times a day  - Out of bed to chair 3 times a day   - Out of bed for meals 3 times a day  - Out of bed for toileting  - Record patient progress and toleration of activity level   Outcome: Progressing     Problem: DISCHARGE PLANNING  Goal: Discharge to home or other facility with appropriate resources  Description: INTERVENTIONS:  - Identify barriers to discharge w/patient and caregiver  - Arrange for needed discharge resources and transportation as appropriate  - Identify discharge learning needs (meds, wound care, etc.)  - Arrange for interpretive services to assist at discharge as needed  - Refer to  Case Management Department for coordinating discharge planning if the patient needs post-hospital services based on physician/advanced practitioner order or complex needs related to functional status, cognitive ability, or social support system  Outcome: Progressing     Problem: Knowledge Deficit  Goal: Patient/family/caregiver demonstrates understanding of disease process, treatment plan, medications, and discharge instructions  Description: Complete learning assessment and assess knowledge base.  Interventions:  - Provide teaching at level of understanding  - Provide teaching via preferred learning methods  Outcome: Progressing     Problem: Nutrition/Hydration-ADULT  Goal: Nutrient/Hydration intake appropriate for improving, restoring or maintaining nutritional needs  Description: Monitor and assess patient's nutrition/hydration status for malnutrition. Collaborate with interdisciplinary team and initiate plan and interventions as ordered.  Monitor patient's weight and dietary intake as ordered or per policy. Utilize nutrition screening tool and intervene as necessary. Determine patient's food preferences and provide high-protein, high-caloric foods as appropriate.     INTERVENTIONS:  - Monitor oral intake, urinary output, labs, and treatment plans  - Assess nutrition and hydration status and recommend course of action  - Evaluate amount of meals eaten  - Assist patient with eating if necessary   - Allow adequate time for meals  - Recommend/ encourage appropriate diets, oral nutritional supplements, and vitamin/mineral supplements  - Order, calculate, and assess calorie counts as needed  - Recommend, monitor, and adjust tube feedings and TPN/PPN based on assessed needs  - Assess need for intravenous fluids  - Provide specific nutrition/hydration education as appropriate  - Include patient/family/caregiver in decisions related to nutrition  Outcome: Progressing

## 2024-08-06 NOTE — ASSESSMENT & PLAN NOTE
Patient with hypotension likely secondary to volume depletion  Discussed with nephrology and critical care team  Critical care recommended to continue with IV fluids and monitor blood pressure closely  Insert NG tube and start tube feeding  Several discussions with patient's family regarding goals of care at this point daughter wants to proceed with level 1 full code  Family will continue to have goals of care discussion with the patient

## 2024-08-06 NOTE — PLAN OF CARE
Problem: Prexisting or High Potential for Compromised Skin Integrity  Goal: Skin integrity is maintained or improved  Description: INTERVENTIONS:  - Identify patients at risk for skin breakdown  - Assess and monitor skin integrity  - Assess and monitor nutrition and hydration status  - Monitor labs   - Assess for incontinence   - Turn and reposition patient  - Assist with mobility/ambulation  - Relieve pressure over bony prominences  - Avoid friction and shearing  - Provide appropriate hygiene as needed including keeping skin clean and dry  - Evaluate need for skin moisturizer/barrier cream  - Collaborate with interdisciplinary team   - Patient/family teaching  - Consider wound care consult   Outcome: Progressing     Problem: PAIN - ADULT  Goal: Verbalizes/displays adequate comfort level or baseline comfort level  Description: Interventions:  - Encourage patient to monitor pain and request assistance  - Assess pain using appropriate pain scale  - Administer analgesics based on type and severity of pain and evaluate response  - Implement non-pharmacological measures as appropriate and evaluate response  - Consider cultural and social influences on pain and pain management  - Notify physician/advanced practitioner if interventions unsuccessful or patient reports new pain  Outcome: Progressing     Problem: INFECTION - ADULT  Goal: Absence or prevention of progression during hospitalization  Description: INTERVENTIONS:  - Assess and monitor for signs and symptoms of infection  - Monitor lab/diagnostic results  - Monitor all insertion sites, i.e. indwelling lines, tubes, and drains  - Monitor endotracheal if appropriate and nasal secretions for changes in amount and color  - [place] appropriate cooling/warming therapies per order  - Administer medications as ordered  - Instruct and encourage patient and family to use good hand hygiene technique  - Identify and instruct in appropriate isolation precautions for  identified infection/condition  Outcome: Progressing  Goal: Absence of fever/infection during neutropenic period  Description: INTERVENTIONS:  - Monitor WBC    Outcome: Progressing     Problem: SAFETY ADULT  Goal: Maintain or return to baseline ADL function  Description: INTERVENTIONS:  - Educate patient/family on patient safety including physical limitations  - Instruct patient to call for assistance with activity   - Consult OT/PT to assist with strengthening/mobility   - Keep Call bell within reach  - Keep bed low and locked with side rails adjusted as appropriate  - Keep care items and personal belongings within reach  - Initiate and maintain comfort rounds  - Make Fall Risk Sign visible to staff  - Apply yellow socks and bracelet for high fall risk patients  - Consider moving patient to room near nurses station  Outcome: Progressing  Goal: Maintains/Returns to pre admission functional level  Description: INTERVENTIONS:  - Perform AM-PAC 6 Click Basic Mobility/ Daily Activity assessment daily.  - Set and communicate daily mobility goal to care team and patient/family/caregiver.   - Collaborate with rehabilitation services on mobility goals if consulted  - Perform Range of Motion 4 times a day.  - Reposition patient every 2 hours.  - Dangle patient 3 times a day  - Stand patient 3 times a day  - Ambulate patient 3 times a day  - Out of bed to chair 3 times a day   - Out of bed for meals 3 times a day  - Out of bed for toileting  - Record patient progress and toleration of activity level   Outcome: Progressing     Problem: DISCHARGE PLANNING  Goal: Discharge to home or other facility with appropriate resources  Description: INTERVENTIONS:  - Identify barriers to discharge w/patient and caregiver  - Arrange for needed discharge resources and transportation as appropriate  - Identify discharge learning needs (meds, wound care, etc.)  - Arrange for interpretive services to assist at discharge as needed  - Refer to  Case Management Department for coordinating discharge planning if the patient needs post-hospital services based on physician/advanced practitioner order or complex needs related to functional status, cognitive ability, or social support system  Outcome: Progressing     Problem: Knowledge Deficit  Goal: Patient/family/caregiver demonstrates understanding of disease process, treatment plan, medications, and discharge instructions  Description: Complete learning assessment and assess knowledge base.  Interventions:  - Provide teaching at level of understanding  - Provide teaching via preferred learning methods  Outcome: Progressing     Problem: Nutrition/Hydration-ADULT  Goal: Nutrient/Hydration intake appropriate for improving, restoring or maintaining nutritional needs  Description: Monitor and assess patient's nutrition/hydration status for malnutrition. Collaborate with interdisciplinary team and initiate plan and interventions as ordered.  Monitor patient's weight and dietary intake as ordered or per policy. Utilize nutrition screening tool and intervene as necessary. Determine patient's food preferences and provide high-protein, high-caloric foods as appropriate.     INTERVENTIONS:  - Monitor oral intake, urinary output, labs, and treatment plans  - Assess nutrition and hydration status and recommend course of action  - Evaluate amount of meals eaten  - Assist patient with eating if necessary   - Allow adequate time for meals  - Recommend/ encourage appropriate diets, oral nutritional supplements, and vitamin/mineral supplements  - Order, calculate, and assess calorie counts as needed  - Recommend, monitor, and adjust tube feedings and TPN/PPN based on assessed needs  - Assess need for intravenous fluids  - Provide specific nutrition/hydration education as appropriate  - Include patient/family/caregiver in decisions related to nutrition  Outcome: Progressing

## 2024-08-06 NOTE — PROGRESS NOTES
Critical care brief note    I was reached out by the primary team to evaluate the patient for hypotension.  On my assessment, patient is in bed, awake and conversant/sleepy.  SBP  systolic, seems to have dry mucous membrane.    74-year-old female with a history of morbid obesity, CKD, persistent A-fib, thoracic spine osteomyelitis, diastolic CHF, generalized anasarca hospitalized 8/3 for acute encephalopathy hypoxic respiratory failure, MRSA bacteremia.  On my assessment she is on room air, previous treated with IV Lasix for pulmonary edema.  Per the primary team, has not been eating or drinking for several days, likely in hypovolemia and/or sepsis.  Bedside lung/TTE ultrasound showed hyperdynamic LV, mild/small pericardial effusion and collapsible IVC 50% or more.  I discussed with Dr. Chadwick and Dr. Carr, will give a trial of IV hydration Isolyte/and albumin, insert NG tube and start tube feeds.  Further goals of care discussion ongoing by the primary team with the family.

## 2024-08-06 NOTE — PROGRESS NOTES
Nutrition     increase as able to tube feed goal:    Jevity 1.2 @ 55 mL /hr x 24 hr.  flush 125 mL water q 6 hr.    will provide: 1584 Kcal, 73 g protein, 1565 mL free water.

## 2024-08-06 NOTE — ASSESSMENT & PLAN NOTE
Lab Results   Component Value Date    EGFR 17 08/06/2024    EGFR 17 08/06/2024    EGFR 18 08/05/2024    CREATININE 2.63 (H) 08/06/2024    CREATININE 2.57 (H) 08/06/2024    CREATININE 2.49 (H) 08/05/2024     Acute kidney injury likely secondary to hypotension, recent contrast, poor p.o. intake, hypoalbuminemia  Patient has been refusing to eat or take her medications  Nephrology input and recommendations appreciated  Continue IV fluids  Albumin 25 g IV every 6 hours for 2 doses  Diuretics on hold  Continue to monitor BMP, avoid nephrotoxins, monitor I&Os

## 2024-08-06 NOTE — ASSESSMENT & PLAN NOTE
Hypernatremic to 150 this am with AMANDA in setting of refusal to take anything po over 24 hours  Continue IV fluids  Encouraged po intake

## 2024-08-06 NOTE — CONSULTS
Consultation - Nephrology   Bhavna Al 74 y.o. female MRN: [id number]  Unit/Bed#: E4 -01 Encounter: [id number]    ASSESSMENT:  Acute Kidney Injury, POA- likely secondary to contrast induced nephropathy in the setting of recent AMANDA + low hgb + hypotension + poor po intake  Baseline creatinine appears to be around 1 however multiple episodes of AMANDA over the past few years  IV contrast given 8/2/24  Peripheral eosinophils is 2 so less likely AIN (recent abx use)  Check UA, doubt intrinsic however  Purewick in place, document I/Os  Hypokalemia- replete with 40meq today  Hypernatremia- improved with D5W  Decrease D5W today from 75 to 50ml/hr and discontinue fluid restriction, allow to drink ad kwadwo  Anemia- hgb below goal  Check iron panel and FOBT  Hypotension- consider adding midodrine  Acute Respiratory Failure- s/p a couple doses of IV lasix  CTA 8/2 showed mild pulmonary edema with small bibasilar pleural effusions  Check CXR  Home diuretic: lasix 20mg daily  MRSA bacteremia- on suppressive abx therapy with oral doxycycline for vertebral osteomyelitis   Rheumatoid Arthritis- chronic prednisone and plaquenil   Failure to Thrive in adult- consider further goals of care discussions with family/patient    PLAN:  Avoid IV contrast  Avoid nephrotoxins  Avoid hypotension  Consider midodrine   Follow and trend BMPs  Iron panel and FOBT  Replete K  Check CXR  Reduce D5W  Discontinue fluid restriction  UA  Intake/output      HISTORY OF PRESENT ILLNESS:  Requesting Physician: Leslie Chadwick MD  Reason for Consult: AMANDA    Bhavna Al is a 74 y.o. year old female who was admitted to [facility] after presenting with shortness of breath after a prolonged hospital stay in the ICU for altered mental status.  She received a few doses of IV lasix since admission on 8/2 however is now receiving IVF.  She did get CTA on admission to rule out PE.  A renal consultation is requested today for assistance in the  management of acute kidney injury.  Upon reviewing prior notes and blood work, it appears the patient has had multiple episodes of AMANDA over the past few years.  Currently, she is not eating for the past few days.  She is resting comfortably in bed without acute distress.  She denies SOB/pain.     PAST MEDICAL HISTORY:  Past Medical History:   Diagnosis Date    Abnormal thyroid function test     last assessed: 2015     Arthritis     Caries     last assessed: 2016     Continuous opioid dependence (HCC) 2021    Edema of right lower extremity     last assessed: 2015     GERD (gastroesophageal reflux disease)     Hypertension     Medicare annual wellness visit, subsequent 2021    Positive blood culture 3/11/2022    Sarcoid        PAST SURGICAL HISTORY:  Past Surgical History:   Procedure Laterality Date     SECTION      IR NON-TUNNELED CENTRAL LINE PLACEMENT  2024    IR PICC PLACEMENT SINGLE LUMEN  2024    IR PICC PLACEMENT SINGLE LUMEN  2024    MULTIPLE TOOTH EXTRACTIONS N/A 2016    Procedure: Surgical extraction of teeth 2, 18, 19, 30, 31; incision and drainage of left subperiosteal abscess ;  Surgeon: Clara Cevallos DMD;  Location: [facility];  Service:        ALLERGIES:  Allergies   Allergen Reactions    Shellfish-Derived Products - Food Allergy Itching    Methotrexate Derivatives     Amoxicillin Rash    Ampicillin-Sulbactam Sodium Rash       SOCIAL HISTORY:  Social History     Substance and Sexual Activity   Alcohol Use Not Currently     Social History     Substance and Sexual Activity   Drug Use No     Social History     Tobacco Use   Smoking Status Never   Smokeless Tobacco Never       FAMILY HISTORY:  Family History   Problem Relation Age of Onset    Asthma Mother     Stroke Mother     No Known Problems Father     No Known Problems Sister     No Known Problems Brother     No Known Problems Maternal Grandmother     No Known Problems Maternal  Grandfather     No Known Problems Paternal Grandmother     No Known Problems Paternal Grandfather     Diabetes Maternal Aunt     Breast cancer Cousin     Diabetes Cousin     Breast cancer Other        MEDICATIONS:    Current Facility-Administered Medications:     acetaminophen (TYLENOL) tablet 975 mg, 975 mg, Oral, Q6H PRN, AZIZA Escalera    apixaban (ELIQUIS) tablet 5 mg, 5 mg, Oral, BID, AZIZA Escalera, 5 mg at 08/04/24 1731    bisacodyl (DULCOLAX) rectal suppository 10 mg, 10 mg, Rectal, Daily PRN, Vane Davis MD    dextrose 5 % infusion, 50 mL/hr, Intravenous, Continuous, Marti Alfaro PA-C    doxycycline hyclate (VIBRAMYCIN) capsule 100 mg, 100 mg, Oral, BID, AZIZA Escalera, 100 mg at 08/04/24 2058    hydroxychloroquine (PLAQUENIL) tablet 200 mg, 200 mg, Oral, BID, Vane Davis MD, 200 mg at 08/04/24 1731    levalbuterol (XOPENEX) inhalation solution 1.25 mg, 1.25 mg, Nebulization, Q8H PRN, AZIZA Escalera    magnesium Oxide (MAG-OX) tablet 400 mg, 400 mg, Oral, BID, AZIZA Escalera, 400 mg at 08/04/24 1731    metoprolol tartrate (LOPRESSOR) tablet 25 mg, 25 mg, Oral, Q12H FAMILIA, AZIZA Escalera, 25 mg at 08/04/24 2058    omeprazole (PRILOSEC) suspension 2 mg/mL, 40 mg, Oral, Daily, AZIZA Escalera    ondansetron (ZOFRAN) injection 4 mg, 4 mg, Intravenous, Q6H PRN, AZIZA Escalera, 4 mg at 08/03/24 0511    oxyCODONE (ROXICODONE) IR tablet 5 mg, 5 mg, Oral, Q6H PRN, AZIZA Escalera    polyethylene glycol (MIRALAX) packet 17 g, 17 g, Oral, Daily PRN, AZIZA Escalera    potassium chloride (Klor-Con M20) CR tablet 40 mEq, 40 mEq, Oral, Once, Leslie Chadwick MD    predniSONE tablet 5 mg, 5 mg, Oral, BID With Meals, AZIZA Escalera, 5 mg at 08/04/24 1731    simethicone (MYLICON) chewable tablet 80 mg, 80 mg, Oral, 4x Daily PRN, AZIZA Escalera    REVIEW OF  SYSTEMS:  A complete 10 point review of systems was performed and found to be negative unless otherwise noted in the history of present illness.  General: No fevers, chills.   Cardiovascular:  No chest pain, + leg edema.  Respiratory: No cough, sputum production,  No shortness of breath.  Gastrointestinal:  No nausea/vomiting,  No diarrhea,  No abdominal pain.  Genitourinary: No hematuria.  No foamy urine.  No dysuria.    PHYSICAL EXAM:  Current Weight: Weight - Scale: 94.1 kg (207 lb 7.3 oz)  First Weight: Weight - Scale: 94.1 kg (207 lb 7.3 oz)  Vitals:    08/05/24 1500 08/06/24 0023 08/06/24 0135 08/06/24 0600   BP: 104/54  91/58    BP Location: Right arm  Right arm    Pulse: 84 89 97    Resp: 18 18 18    Temp: (!) 96.5 °F (35.8 °C) (!) 96.5 °F (35.8 °C)     TempSrc: Temporal Temporal     SpO2: 98% 100%     Weight:    94.1 kg (207 lb 7.3 oz)       Intake/Output Summary (Last 24 hours) at 8/6/2024 0933  Last data filed at 8/6/2024 0330  Gross per 24 hour   Intake 120 ml   Output 201 ml   Net -81 ml     General: NAD  Skin: no rash  Eyes: anicteric  ENMT: mm moist  Neck: no masses  Respiratory: CTAB  CVS: RRR  Extremities: ++ bilateral pedal edema  GI: soft nt nd  Neuro: alert awake  Psych: depressed, flat affect     Lab Results:   Results from last 7 days   Lab Units 08/06/24  0800 08/06/24  0454 08/06/24  0001 08/05/24  1244 08/05/24  0537 08/04/24  1203 08/04/24  0556 08/03/24  0525 08/02/24  1919 08/02/24  0626 08/01/24  0557 07/31/24  0936   WBC Thousand/uL  --  6.62  --   --   --   --   --  4.90 8.44 6.67 5.66 4.68   HEMOGLOBIN g/dL 7.6* 7.9*  --   --   --   --   --  10.7* 8.9* 8.7* 8.5* 9.0*   HEMATOCRIT % 24.4* 25.2*  --   --   --   --   --  34.2* 29.2* 28.2* 26.4* 28.6*   PLATELETS Thousands/uL  --  95*  --   --   --   --   --  80* 150 117* 124* 137*   POTASSIUM mmol/L  --  3.4* 3.4* 3.4* 3.8   < > 4.0  --  3.9 3.9 3.9 4.5   CHLORIDE mmol/L  --  105 106 107 108   < > 113*  --  110* 110* 111* 108   CO2  mmol/L  --  22 22 25 26   < > 25  --  24 27 26 24   BUN mg/dL  --  31* 31* 32* 32*   < > 33*  --  28* 29* 28* 31*   CREATININE mg/dL  --  2.63* 2.57* 2.49* 2.47*   < > 2.15*  --  1.61* 1.48* 1.46* 1.74*   CALCIUM mg/dL  --  8.5 8.4 8.3* 8.6   < > 8.6  --  9.2 8.9 9.1 8.9   MAGNESIUM mg/dL  --   --   --   --  2.4  --  1.7*  --  1.6* 1.6* 1.5* 1.7*   PHOSPHORUS mg/dL  --   --   --   --   --   --   --   --   --   --   --  3.3   ALK PHOS U/L  --   --   --   --   --   --   --   --   --  93 95 86   ALT U/L  --   --   --   --   --   --   --   --   --  55* 60* 58*   AST U/L  --   --   --   --   --   --   --   --   --  47* 64* 74*    < > = values in this interval not displayed.     I have personally reviewed the blood work as stated above and in my note.  I have personally reviewed [facility] notes.

## 2024-08-06 NOTE — PROGRESS NOTES
Follow up Consultation    Nephrology   Bhavna JANUSZ Al 74 y.o. female MRN: [id number]  Unit/Bed#: E4 -01 Encounter: [id number]      Physician Requesting Consult: Leslie Chadwick MD        ASSESSMENT:  74-year-old female with multiple comorbidities including obesity, A-fib, GERD, hypertension, RA, CHF and history of MRSA bacteremia secondary to sacral osteomyelitis in the setting of pressure ulcer status post recent hospitalization in July 2024 with septic shock and AMANDA/A-fib returned within 24 hours of discharge with changes in mental status hypoxia and shortness of breath.  Nephrology has been consulted for evaluation and management of abnormal renal parameters     Acute kidney injury (POA) :   AMANDA most likely secondary to incomplete recovery of recent episode of acute kidney injury in July 2024 with potentiation with contrast (8/2/2024) plus ischemic injury from hypotension as well as some component of intravascular volume depletion in light of hypoalbuminemia.  After review of records it appears that the patient has a baseline Creatinine of 0.9-1.2 mg/dL however had episode of AMANDA in July with peak creatinine 2.77 mg/dL on 7/22/2024 since then creatinine had leveled off around 1.4 to 1.9 mg/dL.  Admission creatinine of 1.48 mg/dL on 8/2/2024.  Creatinine today is at 3.14 mg/dL, unfortunately continues to rise.  check BMP, magnesium, phosphorus in a.m.  Await renal recovery.  Overall poor prognosis recommend goal clarification  Give albumin 25 g IV every 6 x 4 doses, Plasma-Lyte 100 cc an hour for 500 cc then DC  Will give Bumex 2 mg IV x 1  No acute indication for dialysis at this time  Made multiple attempts as contacting patient's daughter to discuss renal status and update as well as renal replacement therapy however I do not believe that patient is a good candidate for renal replacement therapy with her underlying comorbidities and current clinical status.  Will try again later  Hold off on diuretics at this  time  Place on a renal diet when allowed diet order.   Strict I/O.  Daily weights  Urinary retention protocol  Avoid nephrotoxins, adjust meds to appropriate GFR.        H&H/anemia:  most recent hemoglobin at 8.3 g/dL  Maintain hemoglobin greater than 8 grams/deciliter  Follow-up with hematology, consider PRBC transfusion if continues to drop     Acid-base electrolytes:  Hypokalemia:  Most recent potassium 3.9 mEq  Hypernatremia: Secondary to decreased p.o. free water intake consider PEG tube feeds and free water flushes if patient agreeable.   most recent serum sodium 136 mEq DC D5 water for now.  On tube feeds with free water flushes 125 cc every 6 continue for now       blood pressure/hypotension/A-fib:   Optimize hemodynamic status to avoid delay in renal recovery.  Maintain MAP > 65mmHg  Avoid BP fluctuations.  Home medications: Lasix 20 mg p.o. daily  Current medications: Metoprolol 25 mg p.o. every 12  Recommend midodrine 5 mg p.o. 3 times daily hold if SBP greater than 110 if unable to tolerate p.o. then recommend usage of pressors and transferring to critical care unit  Hold diuretics for now May give intermittent if worsening volume status     Other medical problems:  Rheumatoid arthritis:  Management per primary team.  On prednisone and Plaquenil  CHF:  Management per primary team.  Hold diuretics for now.  Most recent echo with EF of greater than 75%.  Status post intermittent doses of IV Lasix with last dose on 8/3/2024 hold off on Lasix for now.  Decreased p.o. intake: Recommend goal clarification consider PEG tube.  Continue IV fluids with D5 for now  MRSA bacteremia/osteomyelitis of thoracic spine: On suppressive antibiotic therapy with oral doxycycline for sacral osteomyelitis  Acute respiratory failure hypoxia: CT chest negative for PE from 8/2/2024.  Hold off on diuretics continue intermittent diuretics as needed.  Follow-up with primary team        Plan below is what was discussed with the  primary team that they agree with:  check BMP, magnesium, phosphorus in a.m.  Recommend goal clarification  Continue midodrine 5 mg p.o. 3 times daily hold if SBP greater than 110 if unable to tolerate p.o. then consider transferring to critical care initiation of vasopressors  Recommend albumin 25 g IV every 6 for 4 doses  dc IV fluids  Continue free water flushes 125 cc every 6  Will give Plasma-Lyte 500 cc x 1  Will give Bumex 2 mg IV x 1    Thanks for the consult  Will continue to follow  Please call with questions/ concerns.      Yoselyn Carr MD, FASN, 2024, 9:48 AM      Addendum  Spoke to daughter in depth with regards to patient's renal status risks benefits renal replacement therapy the procedure discussed in depth all questions and concerns answered even discussed about CRRT in case patient continues to remain hypotensive.  Discussed overall poor prognosis with multiple comorbidities.  She reports she will be in later today and they will decide as a family how to proceed seems to have a better understanding overall of her mom's declining condition and will let me know with regards to their decision about renal replacement therapy within the next 24 hours.  For now continue all medical management.          Objective :   Patient seen and examined in her room blood pressure still remain low remains afebrile remains with restraints not really following commands or participating in review of systems, urine output only document 25 cc / 24 hours was started on tube feeds yesterday was on D5 water still this a.m.      PHYSICAL EXAM  BP 95/54 (BP Location: Right arm)   Pulse 75   Temp (!) 96.4 °F (35.8 °C) (Temporal)   Resp 22   Wt 97 kg (213 lb 13.5 oz)   LMP  (LMP Unknown)   SpO2 97%   BMI 35.59 kg/m²   Temp (24hrs), Av.8 °F (36.6 °C), Min:96.4 °F (35.8 °C), Max:99 °F (37.2 °C)        Intake/Output Summary (Last 24 hours) at 2024 0995  Last data filed at 2024 0238  Gross per 24 hour    Intake 1008.67 ml   Output 25 ml   Net 983.67 ml       I/O last 24 hours:  In: 1128.7 [P.O.:120; I.V.:572.5; Blood:404.2; NG/GT:32]  Out: 25 [Urine:25]      Current Weight: Weight - Scale: 97 kg (213 lb 13.5 oz)  First Weight: Weight - Scale: 94.1 kg (207 lb 7.3 oz)  Physical Exam  Vitals and nursing note reviewed.   Constitutional:       General: She is not in acute distress.     Appearance: Normal appearance. She is obese. She is ill-appearing. She is not toxic-appearing or diaphoretic.   HENT:      Head: Normocephalic and atraumatic.      Nose:      Comments: NGT in place  Eyes:      General: No scleral icterus.  Cardiovascular:      Rate and Rhythm: Normal rate.   Pulmonary:      Effort: No respiratory distress.      Breath sounds: No wheezing.   Abdominal:      Palpations: Abdomen is soft.      Tenderness: There is no abdominal tenderness.   Musculoskeletal:         General: Swelling present.      Cervical back: No rigidity.      Comments: Edema bilateral upper lower extremities   Skin:     Coloration: Skin is not jaundiced.   Neurological:      General: No focal deficit present.      Mental Status: She is alert.      Comments: Not really following commands response to painful stimuli says few words             Review of Systems   Unable to perform ROS: Acuity of condition   Constitutional:  Negative for fever.   Respiratory:  Negative for cough and shortness of breath.    Gastrointestinal:  Negative for diarrhea.   Psychiatric/Behavioral:  Negative for agitation.        Scheduled Meds:  Current Facility-Administered Medications   Medication Dose Route Frequency Provider Last Rate    acetaminophen  975 mg Oral Q6H PRN AZIZA Escalera      apixaban  5 mg Oral BID AZIZA Escalera      bisacodyl  10 mg Rectal Daily PRN Vane Davis MD      doxycycline  100 mg Intravenous Q12H Leslie Chadwick  mg (08/07/24 0228)    hydrocortisone sodium succinate  20 mg Intravenous  Q12H [facility] Leslie Chadwick MD      hydroxychloroquine  200 mg Oral BID Vane Davis MD      levalbuterol  1.25 mg Nebulization Q8H PRN Cathi Briseno, AZIZA      magnesium Oxide  400 mg Oral BID Cathi Briseno, TYSHAWNNP      metoprolol tartrate  25 mg Oral Q12H [facility] Cathi Briseno, AZIZA      midodrine  5 mg Oral TID DANIELLE Alfaro PA-C      omeprazole (PRILOSEC) suspension 2 mg/mL  40 mg Oral Daily Cathi Briseno, AZIZA      ondansetron  4 mg Intravenous Q6H PRN Cathi Briseno, TYSHAWNNP      oxyCODONE  5 mg Oral Q6H PRN Cathi Briseno, TYSHAWNNP      polyethylene glycol  17 g Oral Daily PRN Cathi Briseno, CRNP      simethicone  80 mg Oral 4x Daily PRN Cathi Briseno, TYSHAWNNP         PRN Meds:.  acetaminophen    bisacodyl    levalbuterol    ondansetron    oxyCODONE    polyethylene glycol    simethicone    Continuous Infusions:         Invasive Devices:     Invasive Devices       Central Venous Catheter Line  Duration             CVC Central Lines 08/02/24 Triple 16cm 4 days              Peripheral Intravenous Line  Duration             Peripheral IV 08/05/24 Left;Ventral (anterior) Forearm 1 day              Drain  Duration             External Urinary Catheter 4 days    NG/OG/Enteral Tube Enteral Feeding Tube 16 Fr Right nare <1 day                      LABORATORY:    Results from last 7 days   Lab Units 08/07/24  0453 08/06/24  0800 08/06/24  0454 08/06/24  0001 08/05/24  1244 08/05/24  0537 08/05/24  0001 08/04/24  1811 08/04/24  1203 08/04/24  0556 08/03/24  0525 08/02/24  1919 08/02/24  0626 08/01/24  0557   WBC Thousand/uL 9.01  --  6.62  --   --   --   --   --   --   --  4.90 8.44 6.67 5.66   HEMOGLOBIN g/dL 8.3* 7.6* 7.9*  --   --   --   --   --   --   --  10.7* 8.9* 8.7* 8.5*   HEMATOCRIT % 25.6* 24.4* 25.2*  --   --   --   --   --   --   --  34.2* 29.2* 28.2* 26.4*   PLATELETS Thousands/uL 77*  --  95*  --   --   --   --   --   --   --  80* 150 117* 124*   POTASSIUM  mmol/L 3.9  --  3.4* 3.4* 3.4* 3.8 3.8 3.7   < > 4.0  --  3.9 3.9 3.9   CHLORIDE mmol/L 103  --  105 106 107 108 110* 111*   < > 113*  --  110* 110* 111*   CO2 mmol/L 23  --  22 22 25 26 26 25   < > 25  --  24 27 26   BUN mg/dL 32*  --  31* 31* 32* 32* 30* 31*   < > 33*  --  28* 29* 28*   CREATININE mg/dL 3.14*  --  2.63* 2.57* 2.49* 2.47* 2.33* 2.30*   < > 2.15*  --  1.61* 1.48* 1.46*   CALCIUM mg/dL 8.5  --  8.5 8.4 8.3* 8.6 8.6 8.6   < > 8.6  --  9.2 8.9 9.1   MAGNESIUM mg/dL  --   --   --   --   --  2.4  --   --   --  1.7*  --  1.6* 1.6* 1.5*   PHOSPHORUS mg/dL 3.0  --   --   --   --   --   --   --   --   --   --   --   --   --     < > = values in this interval not displayed.      rest all reviewed    RADIOLOGY:  XR abdomen 1 view kub   Final Result by Peter Laura MD (08/07 0447)   NG tube terminates in the stomach.               Workstation performed: [id number]         CT head wo contrast   Final Result by Osvaldo Garcia MD (08/06 1310)         1. Mild, chronic microangiopathy is stable.   2. No acute intracranial hemorrhage, mass effect or edema.                  Workstation performed: [id number]         XR chest pa & lateral   Final Result by Bairon Carmichael MD (08/06 1538)      Small bilateral pleural effusion.            Workstation performed: [id number]         CT head without contrast   Final Result by Merritt Lazo MD (08/02 2984)      No acute findings.      Findings are consistent with the preliminary report from [facility] which was provided shortly after completion of the exam.                     Workstation performed: [id number]         CTA ED chest PE study   Final Result by Merritt Lazo MD (08/02 1168)      Mild pulmonary edema with small bibasilar pleural effusions and cardiomegaly in keeping with CHF.      The major findings are in agreement with the preliminary report provided by [facility] which was provided shortly after  "completion of the exam.                     Workstation performed: [id number]         XR chest 1 view portable   ED Interpretation by Neel Munoz MD (08/02 1950)   Right internal jugular central line terminating in the heart.  Chronic right upper lobe opacity.      Final Result by Lorena John MD (08/02 2208)      No acute cardiopulmonary disease.      Mild benign bilateral linear scar.      Small pleural effusions better shown on subsequent chest CT.      Workstation performed: [id number]           Rest all reviewed    Portions of the record may have been created with voice recognition software. Occasional wrong word or \"sound a like\" substitutions may have occurred due to the inherent limitations of voice recognition software. Read the chart carefully and recognize, using context, where substitutions have occurred.If you have any questions, please contact the dictating provider.    "

## 2024-08-06 NOTE — ASSESSMENT & PLAN NOTE
Wt Readings from Last 3 Encounters:   08/06/24 94.1 kg (207 lb 7.3 oz)   08/02/24 95 kg (209 lb 7 oz)   07/11/24 108 kg (238 lb)     Preserved EF >75%. BNP >400  Presented to ED with hypoxia 70 to 80s improved with mid flow  CTA showing mild pulmonary edema with small bibasilar pleural effusions and cardiomegaly  Received 1 dose of IV furosemide 40 mg in the ED   Diuretics on hold given poor p.o. intake and hypotension  Monitor daily weight, I&O  Low Na diet with fluid restriction

## 2024-08-06 NOTE — NURSING NOTE
Left a voicemail with Daughter Rhoda, explained the Doctor's concerns and expressed the need to hear from her whether by phone or in person. Will continue to monitor and offer updates when/where applicable.

## 2024-08-06 NOTE — ASSESSMENT & PLAN NOTE
Acute on chronic anemia without any evidence of bleeding  Hemoglobin 7.6  Discussed with patient's daughter-given hypotension, and symptomatic anemia plan to transfuse 1 unit PRBC  Monitor H&H

## 2024-08-06 NOTE — ASSESSMENT & PLAN NOTE
Continue purée with thin liquids per SLP note on 8/01  Aspiration precautions  Patient with overall poor p.o. intake has been refusing to eat or take her medications  Place NGT and start tube feeding  Continue family discussions regarding PEG tube

## 2024-08-06 NOTE — ASSESSMENT & PLAN NOTE
Patient was admitted 7/21-8/02 for sepsis, AMANDA, and afib RVR. During admission she was transferred to ICU for septic shock requring pressors. She had confusion suspected to be delirium. She returned back to baseline mental status and was d/c back to [place].  Returned to ED for dyspnea and hypoxia. 2/2 CHF vs aspiration event    O2 sat 70 to 80s improved with nonrebreather.  Transitioned to mid flow  CTA neg for PE. Mild pulmonary edema with small bibasilar pleural effusions (similar to previous imaging)  Given a dose of lasix  RESOLVED, comfortable on room air

## 2024-08-07 LAB
ABO GROUP BLD BPU: NORMAL
ALBUMIN SERPL BCG-MCNC: 2.7 G/DL (ref 3.5–5)
ALP SERPL-CCNC: 64 U/L (ref 34–104)
ALT SERPL W P-5'-P-CCNC: 41 U/L (ref 7–52)
ANION GAP SERPL CALCULATED.3IONS-SCNC: 10 MMOL/L (ref 4–13)
AST SERPL W P-5'-P-CCNC: 56 U/L (ref 13–39)
BILIRUB SERPL-MCNC: 1.43 MG/DL (ref 0.2–1)
BPU ID: NORMAL
BUN SERPL-MCNC: 32 MG/DL (ref 5–25)
CALCIUM ALBUM COR SERPL-MCNC: 9.5 MG/DL (ref 8.3–10.1)
CALCIUM SERPL-MCNC: 8.5 MG/DL (ref 8.4–10.2)
CHLORIDE SERPL-SCNC: 103 MMOL/L (ref 96–108)
CO2 SERPL-SCNC: 23 MMOL/L (ref 21–32)
CREAT SERPL-MCNC: 3.14 MG/DL (ref 0.6–1.3)
CROSSMATCH: NORMAL
ERYTHROCYTE [DISTWIDTH] IN BLOOD BY AUTOMATED COUNT: 17.8 % (ref 11.6–15.1)
GFR SERPL CREATININE-BSD FRML MDRD: 13 ML/MIN/1.73SQ M
GLUCOSE SERPL-MCNC: 134 MG/DL (ref 65–140)
HCT VFR BLD AUTO: 25.6 % (ref 34.8–46.1)
HGB BLD-MCNC: 8.3 G/DL (ref 11.5–15.4)
MCH RBC QN AUTO: 28.3 PG (ref 26.8–34.3)
MCHC RBC AUTO-ENTMCNC: 32.4 G/DL (ref 31.4–37.4)
MCV RBC AUTO: 87 FL (ref 82–98)
PHOSPHATE SERPL-MCNC: 3 MG/DL (ref 2.3–4.1)
PLATELET # BLD AUTO: 77 THOUSANDS/UL (ref 149–390)
POTASSIUM SERPL-SCNC: 3.9 MMOL/L (ref 3.5–5.3)
PROT SERPL-MCNC: 4.8 G/DL (ref 6.4–8.4)
RBC # BLD AUTO: 2.93 MILLION/UL (ref 3.81–5.12)
SODIUM SERPL-SCNC: 136 MMOL/L (ref 135–147)
UNIT DISPENSE STATUS: NORMAL
UNIT PRODUCT CODE: NORMAL
UNIT PRODUCT VOLUME: 350 ML
UNIT RH: NORMAL
WBC # BLD AUTO: 9.01 THOUSAND/UL (ref 4.31–10.16)

## 2024-08-07 PROCEDURE — 84100 ASSAY OF PHOSPHORUS: CPT | Performed by: PHYSICIAN ASSISTANT

## 2024-08-07 PROCEDURE — 99232 SBSQ HOSP IP/OBS MODERATE 35: CPT | Performed by: INTERNAL MEDICINE

## 2024-08-07 PROCEDURE — 85027 COMPLETE CBC AUTOMATED: CPT | Performed by: PHYSICIAN ASSISTANT

## 2024-08-07 PROCEDURE — 80053 COMPREHEN METABOLIC PANEL: CPT | Performed by: INTERNAL MEDICINE

## 2024-08-07 PROCEDURE — 99233 SBSQ HOSP IP/OBS HIGH 50: CPT | Performed by: INTERNAL MEDICINE

## 2024-08-07 RX ORDER — HYDROMORPHONE HCL/PF 1 MG/ML
0.3 SYRINGE (ML) INJECTION EVERY 2 HOUR PRN
Status: DISCONTINUED | OUTPATIENT
Start: 2024-08-07 | End: 2024-08-08

## 2024-08-07 RX ORDER — LORAZEPAM 2 MG/ML
1 INJECTION INTRAMUSCULAR EVERY 2 HOUR PRN
Status: DISCONTINUED | OUTPATIENT
Start: 2024-08-07 | End: 2024-08-08 | Stop reason: HOSPADM

## 2024-08-07 RX ORDER — SODIUM CHLORIDE, SODIUM GLUCONATE, SODIUM ACETATE, POTASSIUM CHLORIDE, MAGNESIUM CHLORIDE, SODIUM PHOSPHATE, DIBASIC, AND POTASSIUM PHOSPHATE .53; .5; .37; .037; .03; .012; .00082 G/100ML; G/100ML; G/100ML; G/100ML; G/100ML; G/100ML; G/100ML
100 INJECTION, SOLUTION INTRAVENOUS CONTINUOUS
Status: DISCONTINUED | OUTPATIENT
Start: 2024-08-07 | End: 2024-08-07

## 2024-08-07 RX ORDER — BUMETANIDE 0.25 MG/ML
2 INJECTION INTRAMUSCULAR; INTRAVENOUS ONCE
Status: COMPLETED | OUTPATIENT
Start: 2024-08-07 | End: 2024-08-07

## 2024-08-07 RX ORDER — MIDODRINE HYDROCHLORIDE 5 MG/1
5 TABLET ORAL ONCE
Status: COMPLETED | OUTPATIENT
Start: 2024-08-07 | End: 2024-08-07

## 2024-08-07 RX ORDER — ALBUMIN (HUMAN) 12.5 G/50ML
25 SOLUTION INTRAVENOUS EVERY 6 HOURS
Status: DISCONTINUED | OUTPATIENT
Start: 2024-08-07 | End: 2024-08-07

## 2024-08-07 RX ORDER — MIDODRINE HYDROCHLORIDE 5 MG/1
10 TABLET ORAL
Status: DISCONTINUED | OUTPATIENT
Start: 2024-08-07 | End: 2024-08-07

## 2024-08-07 RX ADMIN — MIDODRINE HYDROCHLORIDE 5 MG: 5 TABLET ORAL at 08:00

## 2024-08-07 RX ADMIN — HYDROCORTISONE SODIUM SUCCINATE 20 MG: 100 INJECTION, POWDER, FOR SOLUTION INTRAMUSCULAR; INTRAVENOUS at 09:14

## 2024-08-07 RX ADMIN — MIDODRINE HYDROCHLORIDE 5 MG: 5 TABLET ORAL at 12:57

## 2024-08-07 RX ADMIN — ALBUMIN (HUMAN) 25 G: 0.25 INJECTION, SOLUTION INTRAVENOUS at 10:01

## 2024-08-07 RX ADMIN — DOXYCYCLINE 100 MG: 100 INJECTION, POWDER, LYOPHILIZED, FOR SOLUTION INTRAVENOUS at 02:28

## 2024-08-07 RX ADMIN — APIXABAN 5 MG: 5 TABLET, FILM COATED ORAL at 08:00

## 2024-08-07 RX ADMIN — Medication 40 MG: at 08:00

## 2024-08-07 RX ADMIN — HYDROXYCHLOROQUINE SULFATE 200 MG: 200 TABLET ORAL at 08:00

## 2024-08-07 RX ADMIN — Medication 400 MG: at 08:00

## 2024-08-07 RX ADMIN — MIDODRINE HYDROCHLORIDE 5 MG: 5 TABLET ORAL at 11:45

## 2024-08-07 RX ADMIN — BUMETANIDE 2 MG: 0.25 INJECTION INTRAMUSCULAR; INTRAVENOUS at 14:06

## 2024-08-07 RX ADMIN — DOXYCYCLINE 100 MG: 100 INJECTION, POWDER, LYOPHILIZED, FOR SOLUTION INTRAVENOUS at 13:51

## 2024-08-07 NOTE — ASSESSMENT & PLAN NOTE
Patient was admitted 7/21-8/02 for sepsis, AMANDA, and afib RVR. During admission she was transferred to ICU for septic shock requring pressors. She had confusion suspected to be delirium. She returned back to baseline mental status and was d/c back to [place].  Returned to ED for dyspnea and hypoxia. 2/2 CHF vs aspiration event    O2 sat 70 to 80s improved with nonrebreather.  Transitioned to mid flow  CTA neg for PE. Mild pulmonary edema with small bibasilar pleural effusions (similar to previous imaging)  Given a dose of lasix  RESOLVED, comfortable on room air

## 2024-08-07 NOTE — ASSESSMENT & PLAN NOTE
Extensive discussion with family at bedside occluding daughter and granddaughters.  Patient continues to significantly decline has been more lethargic, becoming encephalopathic, has not been eating or drinking, continuing fluid via NGT, worsening kidney function nephrology stating possible need for dialysis.  Overall poor prognosis given multiple comorbidities.  Family decided for transition to comfort care.  Level 4 comfort care  Hospice consult  Will start comfort medications

## 2024-08-07 NOTE — ASSESSMENT & PLAN NOTE
Wt Readings from Last 3 Encounters:   08/07/24 97 kg (213 lb 13.5 oz)   08/02/24 95 kg (209 lb 7 oz)   07/11/24 108 kg (238 lb)     Preserved EF >75%. BNP >400  Presented to ED with hypoxia 70 to 80s improved with mid flow  CTA showing mild pulmonary edema with small bibasilar pleural effusions and cardiomegaly  Diuretics on hold given poor p.o. intake and hypotension

## 2024-08-07 NOTE — ASSESSMENT & PLAN NOTE
Hypernatremic to 150 this am with AMANDA in setting of refusal to take anything po over 24 hours  Encouraged po intake

## 2024-08-07 NOTE — ASSESSMENT & PLAN NOTE
Continue purée with thin liquids per SLP note on 8/01  Patient with overall poor p.o. intake has been refusing to eat or take her medications  Continue pleasure feeds

## 2024-08-07 NOTE — ASSESSMENT & PLAN NOTE
Lab Results   Component Value Date    EGFR 13 08/07/2024    EGFR 17 08/06/2024    EGFR 17 08/06/2024    CREATININE 3.14 (H) 08/07/2024    CREATININE 2.63 (H) 08/06/2024    CREATININE 2.57 (H) 08/06/2024     Acute kidney injury likely secondary to hypotension, recent contrast, poor p.o. intake, hypoalbuminemia  Patient has been refusing to eat or take her medications  Nephrology input and recommendations appreciated

## 2024-08-07 NOTE — ASSESSMENT & PLAN NOTE
Patient with hypotension likely secondary to volume depletion  Discussed with nephrology and critical care team  extensive discussion with family meeting patient's family has decided for hospice care

## 2024-08-08 ENCOUNTER — HOME CARE VISIT (OUTPATIENT)
Dept: HOME HEALTH SERVICES | Facility: HOME HEALTHCARE | Age: 74
End: 2024-08-08
Payer: MEDICARE

## 2024-08-08 VITALS
RESPIRATION RATE: 18 BRPM | BODY MASS INDEX: 35.59 KG/M2 | OXYGEN SATURATION: 96 % | HEART RATE: 71 BPM | SYSTOLIC BLOOD PRESSURE: 126 MMHG | WEIGHT: 213.85 LBS | DIASTOLIC BLOOD PRESSURE: 53 MMHG | TEMPERATURE: 96.6 F

## 2024-08-08 PROBLEM — N18.6 END STAGE RENAL DISEASE (HCC): Status: ACTIVE | Noted: 2024-01-01

## 2024-08-08 PROCEDURE — 99239 HOSP IP/OBS DSCHRG MGMT >30: CPT | Performed by: INTERNAL MEDICINE

## 2024-08-08 RX ORDER — HYDROMORPHONE HCL/PF 1 MG/ML
0.3 SYRINGE (ML) INJECTION EVERY 2 HOUR PRN
Status: DISCONTINUED | OUTPATIENT
Start: 2024-08-08 | End: 2024-08-08 | Stop reason: HOSPADM

## 2024-08-08 RX ORDER — LORAZEPAM 2 MG/ML
1 INJECTION INTRAMUSCULAR EVERY 2 HOUR PRN
Status: CANCELLED | OUTPATIENT
Start: 2024-08-08

## 2024-08-08 RX ORDER — ONDANSETRON 2 MG/ML
4 INJECTION INTRAMUSCULAR; INTRAVENOUS EVERY 6 HOURS PRN
Status: CANCELLED | OUTPATIENT
Start: 2024-08-08

## 2024-08-08 RX ORDER — POLYETHYLENE GLYCOL 3350 17 G/17G
17 POWDER, FOR SOLUTION ORAL DAILY PRN
Status: CANCELLED | OUTPATIENT
Start: 2024-08-08

## 2024-08-08 RX ORDER — BISACODYL 10 MG
10 SUPPOSITORY, RECTAL RECTAL DAILY PRN
Status: CANCELLED | OUTPATIENT
Start: 2024-08-08

## 2024-08-08 RX ORDER — ACETAMINOPHEN 325 MG/1
975 TABLET ORAL EVERY 6 HOURS PRN
Status: CANCELLED | OUTPATIENT
Start: 2024-08-08

## 2024-08-08 RX ORDER — OXYCODONE HYDROCHLORIDE 5 MG/1
5 TABLET ORAL EVERY 6 HOURS PRN
Status: DISCONTINUED | OUTPATIENT
Start: 2024-08-08 | End: 2024-08-08 | Stop reason: HOSPADM

## 2024-08-08 RX ORDER — LEVALBUTEROL INHALATION SOLUTION 1.25 MG/3ML
1.25 SOLUTION RESPIRATORY (INHALATION) EVERY 8 HOURS PRN
Status: CANCELLED | OUTPATIENT
Start: 2024-08-08

## 2024-08-08 RX ORDER — ACETAMINOPHEN 325 MG/1
975 TABLET ORAL EVERY 6 HOURS PRN
Status: DISCONTINUED | OUTPATIENT
Start: 2024-08-08 | End: 2024-08-08 | Stop reason: HOSPADM

## 2024-08-08 RX ORDER — SIMETHICONE 80 MG
80 TABLET,CHEWABLE ORAL 4 TIMES DAILY PRN
Status: CANCELLED | OUTPATIENT
Start: 2024-08-08

## 2024-08-08 RX ORDER — OXYCODONE HYDROCHLORIDE 5 MG/1
5 TABLET ORAL EVERY 6 HOURS PRN
Status: CANCELLED | OUTPATIENT
Start: 2024-08-08

## 2024-08-08 RX ORDER — HYDROMORPHONE HCL/PF 1 MG/ML
0.3 SYRINGE (ML) INJECTION EVERY 2 HOUR PRN
Status: CANCELLED | OUTPATIENT
Start: 2024-08-08

## 2024-08-08 RX ADMIN — LORAZEPAM 1 MG: 2 INJECTION INTRAMUSCULAR; INTRAVENOUS at 08:57

## 2024-08-08 RX ADMIN — HYDROMORPHONE HYDROCHLORIDE 0.3 MG: 1 INJECTION, SOLUTION INTRAMUSCULAR; INTRAVENOUS; SUBCUTANEOUS at 14:14

## 2024-08-08 RX ADMIN — HYDROMORPHONE HYDROCHLORIDE 0.3 MG: 1 INJECTION, SOLUTION INTRAMUSCULAR; INTRAVENOUS; SUBCUTANEOUS at 10:33

## 2024-08-08 NOTE — HOSPICE NOTE
Received hospice referral. I met with pts daughter Rhoda to evaluate for hospice services. Hospice benefits reviewed per Medicare guidelines and all questions answered. Dr Sabillon approved for Inpatient Hospice LOC.  Consents reviewed and signed by Rhoda, emailed to Ruchi at [facility].  SHARRI Melara updated, transport arranged for 2pm.  Nurse informed to call [facility] with report 30 minutes prior to transport time. Reminded him any IVs and catheters remain in place and pt may be medicated prior to discharge for comfort during transport.

## 2024-08-08 NOTE — CASE MANAGEMENT
Case Management Discharge Planning Note    Patient name Bhavna Al  Location [facility] /[facility] -* MRN [id number]  : 1950 Date 2024       Current Admission Date: 2024  Current Admission Diagnosis:Acute respiratory failure with hypoxia (HCC)   Patient Active Problem List    Diagnosis Date Noted Date Diagnosed    Acute respiratory failure with hypoxia (HCC) 2024     Chronic anemia 2024     Hypomagnesemia 2024     Pain and swelling of left upper extremity 2024     Goals of care, counseling/discussion 2024     encephalopathy 2024     Elevated troponin 2024     Sepsis ([facility]) 2024     Acute kidney injury superimposed on chronic kidney disease  ([facility]) 2024     Hypokalemia 2024     Chronic pruritic rash in adult 2024     Secondary adrenal insufficiency ([facility]) 2024     Pressure ulcer of left buttock, stage 3 ([facility]) 2024     Acute osteomyelitis of thoracic spine ([facility]) 2024     MRSA bacteremia 2024     Hypotension 2024     Dysphagia 2024     Deep tissue injury 2024     Hypernatremia 2024     Localized swelling on left hand 2023     Acute renal failure superimposed on stage 3 chronic kidney disease ([facility]) 2023     Febrile illness 2023     Dermatitis associated with incontinence 2023     Right shoulder pain 12/15/2022     Abnormal urinalysis 2022     Ambulatory dysfunction 2022     Hyperuricemia 2022     Acute metabolic encephalopathy 2022     Acute on chronic diastolic heart failure, LVEF 65%, LVIDd 3.3 cm, AHA Stage C 2022     Acute bronchitis 2022     Assistance needed with transportation 09/15/2022     Abnormal CT of the chest 2022     Gram-positive cocci bacteremia 2022     Psoriasis 2022     COVID-19 2022     Shock ([facility]) 01/10/2022     Persistent atrial fibrillation 01/10/2022     Hyperparathyroidism ([facility])  12/02/2021     Murmur, cardiac 12/02/2021     BPPV (benign paroxysmal positional vertigo) 07/11/2018     Seasonal allergic rhinitis due to pollen 07/11/2018     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     Osteoporosis 12/05/2016     Leukopenia 08/23/2016     Rheumatoid arthritis (HCC) 08/23/2016     GERD (gastroesophageal reflux disease) 08/23/2016     Sarcoid 08/23/2016     Morbid obesity (HCC) 07/12/2016     Vitamin D deficiency 07/16/2015     Essential hypertension 12/04/2014     Lumbar radiculopathy 12/04/2014       LOS (days): 5  Geometric Mean LOS (GMLOS) (days): 3.9  Days to GMLOS:-1.6     OBJECTIVE:  Risk of Unplanned Readmission Score: 44.43         Current admission status: Inpatient   Preferred Pharmacy:   [id number] - [place], [place] - [address]  [address]  [place] [address]  Phone: [phone] Fax: [phone]    [facility] - [address] [address]  Phone: [phone] Fax: [phone]    Primary Care Provider: Nya Taveras DO    Primary Insurance: MEDICARE  Secondary Insurance: [facility]    DISCHARGE DETAILS:                                                    Treatment Team Recommendation: Hospice  Discharge Destination Plan:: Hospice  Transport at Discharge : [facility]  Dispatcher Contacted: Yes  Number/Name of Dispatcher: [employer]  Transported by (Company and Unit #): [employer]  ETA of Transport (Date): 08/08/24  ETA of Transport (Time): 1400                       Additional Comments: Pt was seen by Fiona Gonzalez RN from  Hospice.  she evaluated the pt and the pt does meet inpt Hopsice criteria.  She had contacted the dtr and dtr will be coming in sign consents.  She also had Out of Hospital DNR singed and completed byt he attending.  She had requested transport to set up for 1400.  Request placed in [employer].

## 2024-08-08 NOTE — CASE MANAGEMENT
Case Management Discharge Planning Note    Patient name Bhavna Al  Location [facility] /E4 -* MRN [id number]  : 1950 Date 2024       Current Admission Date: 2024  Current Admission Diagnosis:Acute respiratory failure with hypoxia (HCC)   Patient Active Problem List    Diagnosis Date Noted Date Diagnosed    Acute respiratory failure with hypoxia (HCC) 2024     Chronic anemia 2024     Hypomagnesemia 2024     Pain and swelling of left upper extremity 2024     Goals of care, counseling/discussion 2024     encephalopathy 2024     Elevated troponin 2024     Sepsis (Formerly [facility]) 2024     Acute kidney injury superimposed on chronic kidney disease  (Formerly [facility]) 2024     Hypokalemia 2024     Chronic pruritic rash in adult 2024     Secondary adrenal insufficiency (Formerly [facility]) 2024     Pressure ulcer of left buttock, stage 3 (Formerly [facility]) 2024     Acute osteomyelitis of thoracic spine (Formerly [facility]) 2024     MRSA bacteremia 2024     Hypotension 2024     Dysphagia 2024     Deep tissue injury 2024     Hypernatremia 2024     Localized swelling on left hand 2023     Acute renal failure superimposed on stage 3 chronic kidney disease (Formerly [facility]) 2023     Febrile illness 2023     Dermatitis associated with incontinence 2023     Right shoulder pain 12/15/2022     Abnormal urinalysis 2022     Ambulatory dysfunction 2022     Hyperuricemia 2022     Acute metabolic encephalopathy 2022     Acute on chronic diastolic heart failure, LVEF 65%, LVIDd 3.3 cm, AHA Stage C 2022     Acute bronchitis 2022     Assistance needed with transportation 09/15/2022     Abnormal CT of the chest 2022     Gram-positive cocci bacteremia 2022     Psoriasis 2022     COVID-19 2022     Shock (Formerly [facility]) 01/10/2022     Persistent atrial fibrillation 01/10/2022     Hyperparathyroidism (Formerly [facility])  "12/02/2021     Murmur, cardiac 12/02/2021     BPPV (benign paroxysmal positional vertigo) 07/11/2018     Seasonal allergic rhinitis due to pollen 07/11/2018     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     Osteoporosis 12/05/2016     Leukopenia 08/23/2016     Rheumatoid arthritis (HCC) 08/23/2016     GERD (gastroesophageal reflux disease) 08/23/2016     Sarcoid 08/23/2016     Morbid obesity (HCC) 07/12/2016     Vitamin D deficiency 07/16/2015     Essential hypertension 12/04/2014     Lumbar radiculopathy 12/04/2014       LOS (days): 5  Geometric Mean LOS (GMLOS) (days): 3.9  Days to GMLOS:-1.5     OBJECTIVE:  Risk of Unplanned Readmission Score: 44.35         Current admission status: Inpatient   Preferred Pharmacy:   [id number] - [place], [place] - [address]  [address]  [place] [address]  Phone: [phone] Fax: [phone]    [facility] - [address] [address]  Phone: [phone] Fax: [phone]    Primary Care Provider: Nya Taveras DO    Primary Insurance: MEDICARE  Secondary Insurance: [facility]    DISCHARGE DETAILS:                                                                                                 Additional Comments: CM made aware that the attending MD had met with the pt and family and all are in agreement with comfort care measures.  Hopsice consult has been placed.  [facility] referral in place and they will be evaluating her later today.  Dtr Teresa was called and informed.  She was made aware that she will be evaluated to see if she meets criteria fo inpt Hospice.  We reviewed other options if criteria not met.  Home hospice is not possible.  She was made aware that hospice can be done in a SNF.  She adamantly said, \"I do not want her to retunr to [place].  I will need her to go to another facility.\"  CN acknowledged her statement.  Will wait for " evaluation from  Hospice, and will discuss mSNfs with Hospice with dtr if she does not meet criteria for inpt hospice.  Dtr agrees with the plan.

## 2024-08-08 NOTE — DISCHARGE SUMMARY
[facility]  Discharge- Bhavna Al 1950, 74 y.o. female MRN: [id number]  Unit/Bed#: E4 -01 Encounter: [id number]  Primary Care Provider: Nya Taveras DO   Date and time admitted to hospital: 8/2/2024  5:05 PM    * Acute respiratory failure with hypoxia (HCC)  Assessment & Plan  Patient was admitted 7/21-8/02 for sepsis, AMANDA, and afib RVR. During admission she was transferred to ICU for septic shock requring pressors. She had confusion suspected to be delirium. She returned back to baseline mental status and was d/c back to [place].  Returned to ED for dyspnea and hypoxia. 2/2 CHF vs aspiration event    O2 sat 70 to 80s improved with nonrebreather.  Transitioned to mid flow  CTA neg for PE. Mild pulmonary edema with small bibasilar pleural effusions (similar to previous imaging)  on room air    Goals of care, counseling/discussion  Assessment & Plan  Extensive discussion with family at bedside occluding daughter and granddaughters.  Patient continues to significantly decline has been more lethargic, becoming encephalopathic, has not been eating or drinking, continuing fluid via NGT, worsening kidney function nephrology stating possible need for dialysis.  Overall poor prognosis given multiple comorbidities.  Family decided for transition to comfort care.  Level 4 comfort care  Discharged to [facility] inpatient hospice house    Hypotension  Assessment & Plan  Patient with hypotension likely secondary to volume depletion  Discussed with nephrology and critical care team  extensive discussion with family meeting patient's family has decided for hospice care      Dysphagia  Assessment & Plan  Continue purée with thin liquids per SLP note on 8/01  Patient with overall poor p.o. intake has been refusing to eat or take her medications  Continue pleasure feeds    Chronic anemia  Assessment & Plan  Acute on chronic anemia without any evidence of bleeding  Hemoglobin 8.3 s/p 1u  PRBC 8/6    Pain and swelling of left upper extremity  Assessment & Plan  Persistent left upper extremity swelling thought to be 2/2 superficial thrombophlebitis vs dependent edema vs swelling from IV line  Venous duplex 8/01 negative for DVT:  Technically difficult/limited study. The patient refused to move her arm. Unable to visualize the brachial, cephalic, and basilic veins. Unable to visualize the forearm.  Continue supportive treatment with rest, ice and elevation  PRN Analgesics  Neurovascular checks    Elevated troponin  Assessment & Plan  Continues with troponin elevation thought to be secondary to sepsis and AMANDA  Hypoxic respiratory failure also likely contributing    Persistent atrial fibrillation  Assessment & Plan  Rate control: Metoprolol 25 mg BID  Anticoagulation: Eliquis 5mg BID    MRSA bacteremia  Assessment & Plan  On suppressive antibiotic therapy with oral doxycycline 100mg BID for vertebral osteomyelitis. Per ID: planned prolonged course ideally until inflammatory markers normalized/plateau     Acute osteomyelitis of thoracic spine (HCC)  Assessment & Plan  On suppressive antibiotic therapy with oral doxycycline for sacral osteomyelitis with MRSA     Hypernatremia  Assessment & Plan  Hypernatremic to 150 this am with AMANDA in setting of refusal to take anything po over 24 hours  Encouraged po intake    Acute renal failure superimposed on stage 3 chronic kidney disease (HCC)  Assessment & Plan  Lab Results   Component Value Date    EGFR 13 08/07/2024    EGFR 17 08/06/2024    EGFR 17 08/06/2024    CREATININE 3.14 (H) 08/07/2024    CREATININE 2.63 (H) 08/06/2024    CREATININE 2.57 (H) 08/06/2024     Acute kidney injury likely secondary to hypotension, recent contrast, poor p.o. intake, hypoalbuminemia  Patient has been refusing to eat or take her medications  Nephrology input and recommendations appreciated    Acute metabolic encephalopathy  Assessment & Plan  Presents again with encephalopathy.  In  setting of hypoxia  Recent admission for encephalopathy, was obtunded requiring transfer to ICU, thought to be secondary to worsening hypoactive delirium  CTH negative for acute intracranial abnormalities  RESOLVED    Acute on chronic diastolic heart failure, LVEF 65%, LVIDd 3.3 cm, AHA Stage C  Assessment & Plan  Wt Readings from Last 3 Encounters:   08/07/24 97 kg (213 lb 13.5 oz)   08/02/24 95 kg (209 lb 7 oz)   07/11/24 108 kg (238 lb)     Preserved EF >75%. BNP >400  Presented to ED with hypoxia 70 to 80s improved with mid flow  CTA showing mild pulmonary edema with small bibasilar pleural effusions and cardiomegaly  Diuretics on hold given poor p.o. intake and hypotension    Rheumatoid arthritis (HCC)  Assessment & Plan        Transition of Care Discharge Summary - [facility]    Patient Information: Bhavna Al 74 y.o. female MRN: [id number]  Unit/Bed#: E4 -01 Encounter: [id number]    Discharging Physician / Practitioner: Leslie Chadwick MD  PCP: Nya Taveras DO  Admission Date: 8/2/2024  Discharge Date: 08/08/24    Disposition:      Other: Inpatient hospice house [facility]      Reason for Admission: Acute hypoxic respiratory failure    Discharge Diagnoses:     Principal Problem:    Acute respiratory failure with hypoxia (HCC)  Active Problems:    Hypotension    Goals of care, counseling/discussion    Dysphagia    Rheumatoid arthritis (HCC)    Acute on chronic diastolic heart failure, LVEF 65%, LVIDd 3.3 cm, AHA Stage C    Acute metabolic encephalopathy    Acute renal failure superimposed on stage 3 chronic kidney disease (HCC)    Hypernatremia    Acute osteomyelitis of thoracic spine (HCC)    MRSA bacteremia    Persistent atrial fibrillation    Elevated troponin    Pain and swelling of left upper extremity    Chronic anemia  Resolved Problems:    * No resolved hospital problems. *      Consultations During Hospital Stay:  IP CONSULT TO NUTRITION SERVICES  IP CONSULT TO  NEPHROLOGY  IP CONSULT TO NUTRITION SERVICES  IP CONSULT TO HOSPICE      Procedures Performed:     none    Medication Adjustments and Discharge Medications:  Medication Dosing Tapers - Please refer to Discharge Medication List for details on any medication dosing tapers (if applicable to patient).  Discharge Medication List: See after visit summary for reconciled discharge medications.     Wound Care Recommendations:  When applicable, please see wound care section of After Visit Summary.    Diet Recommendations at Discharge:  Diet -   Fluid Restriction - No Fluid Restriction at Discharge.        Hospital Course:     Bhavna Al is a 74 y.o. female patient who originally presented to the hospital on 8/2/2024 due to acute hypoxic respiratory failure.  Likely secondary to aspiration hypoxia did improve.  Patient refused to take her medications did not have any oral intake did ultimately require NGT placement.  She also had acute kidney injury which was worsening, nephrology followed there was discussion of dialysis.  Ultimately family meeting was held and family decided for hospice care.  Patient was transition to level 4 DNR and she is excepted to inpatient hospice house.    Please see above problem list for further details.      Condition at Discharge: poor     Discharge Day Visit / Exam:     Subjective: Patient seen and examined at bedside, does open her eyes, does not verbalize    Vitals: Blood Pressure: 126/53 (08/07/24 1500)  Pulse: 71 (08/08/24 0719)  Temperature: (!) 96.6 °F (35.9 °C) (08/07/24 1500)  Temp Source: Temporal (08/07/24 1500)  Respirations: 18 (08/08/24 0719)  Weight - Scale: 97 kg (213 lb 13.5 oz) (08/07/24 0600)  SpO2: 96 % (08/08/24 0719)    Physical Exam:    Constitutional: Patient is in no acute distress  HEENT:  Normocephalic, atraumatic  Cardiovascular: Normal S1S2, RRR, No murmurs/rubs/gallops appreciated.  Pulmonary:  Bilateral air entry, No rhonchi/rales/wheezing appreciated  Abdominal:  Soft, Bowel sounds present, Non-tender, Non-distended  Extremities: Lower extremity edema  Neurological: Awake, does track, nonverbal    Discharge instructions/Information to patient and family:   See after visit summary section titled Discharge Instructions for information provided to patient and family.      Planned Readmission: no      Discharge Statement:  I spent 35 minutes discharging the patient. This time was spent on the day of discharge. I had direct contact with the patient on the day of discharge. Greater than 50% of the total time was spent examining patient, answering all patient questions, arranging and discussing plan of care with patient as well as directly providing post-discharge instructions.  Additional time then spent on discharge activities.    ** Please Note: This note has been constructed using a voice recognition system **

## 2024-08-08 NOTE — QUICK NOTE
Patient transitioned over to comfort care after family meeting with primary team yesterday.  Nephrology will sign off thank you

## 2024-08-08 NOTE — RESTORATIVE TECHNICIAN NOTE
Restorative Technician Note      Patient Name: Bhavna BOUDREAUX Al     Restorative Tech Visit Date: 08/08/24  Note Type: Mobility  Patient Position Upon Consult: Supine  Activity Performed: Range of motion; Repositioned  Patient Position at End of Consult: All needs within reach; Supine

## 2024-08-08 NOTE — ASSESSMENT & PLAN NOTE
Patient was admitted 7/21-8/02 for sepsis, AMANDA, and afib RVR. During admission she was transferred to ICU for septic shock requring pressors. She had confusion suspected to be delirium. She returned back to baseline mental status and was d/c back to [place].  Returned to ED for dyspnea and hypoxia. 2/2 CHF vs aspiration event    O2 sat 70 to 80s improved with nonrebreather.  Transitioned to mid flow  CTA neg for PE. Mild pulmonary edema with small bibasilar pleural effusions (similar to previous imaging)  Given a dose of lasix  RESOLVED, comfortable on room air

## 2024-08-08 NOTE — PLAN OF CARE
Problem: Prexisting or High Potential for Compromised Skin Integrity  Goal: Skin integrity is maintained or improved  Description: INTERVENTIONS:  - Identify patients at risk for skin breakdown  - Assess and monitor skin integrity  - Assess and monitor nutrition and hydration status  - Monitor labs   - Assess for incontinence   - Turn and reposition patient  - Assist with mobility/ambulation  - Relieve pressure over bony prominences  - Avoid friction and shearing  - Provide appropriate hygiene as needed including keeping skin clean and dry  - Evaluate need for skin moisturizer/barrier cream  - Collaborate with interdisciplinary team   - Patient/family teaching  - Consider wound care consult   Outcome: Progressing     Problem: PAIN - ADULT  Goal: Verbalizes/displays adequate comfort level or baseline comfort level  Description: Interventions:  - Encourage patient to monitor pain and request assistance  - Assess pain using appropriate pain scale  - Administer analgesics based on type and severity of pain and evaluate response  - Implement non-pharmacological measures as appropriate and evaluate response  - Consider cultural and social influences on pain and pain management  - Notify physician/advanced practitioner if interventions unsuccessful or patient reports new pain  Outcome: Progressing     Problem: INFECTION - ADULT  Goal: Absence or prevention of progression during hospitalization  Description: INTERVENTIONS:  - Assess and monitor for signs and symptoms of infection  - Monitor lab/diagnostic results  - Monitor all insertion sites, i.e. indwelling lines, tubes, and drains  - Monitor endotracheal if appropriate and nasal secretions for changes in amount and color  - [place] appropriate cooling/warming therapies per order  - Administer medications as ordered  - Instruct and encourage patient and family to use good hand hygiene technique  - Identify and instruct in appropriate isolation precautions for  identified infection/condition  Outcome: Progressing  Goal: Absence of fever/infection during neutropenic period  Description: INTERVENTIONS:  - Monitor WBC    Outcome: Progressing     Problem: SAFETY ADULT  Goal: Maintain or return to baseline ADL function  Description: INTERVENTIONS:  - Educate patient/family on patient safety including physical limitations  - Instruct patient to call for assistance with activity   - Consult OT/PT to assist with strengthening/mobility   - Keep Call bell within reach  - Keep bed low and locked with side rails adjusted as appropriate  - Keep care items and personal belongings within reach  - Initiate and maintain comfort rounds  - Make Fall Risk Sign visible to staff  - Apply yellow socks and bracelet for high fall risk patients  - Consider moving patient to room near nurses station  Outcome: Progressing  Goal: Maintains/Returns to pre admission functional level  Description: INTERVENTIONS:  - Perform AM-PAC 6 Click Basic Mobility/ Daily Activity assessment daily.  - Set and communicate daily mobility goal to care team and patient/family/caregiver.   - Collaborate with rehabilitation services on mobility goals if consulted  - Perform Range of Motion 3 times a day.  - Reposition patient every 2 hours.  - Dangle patient 3 times a day  - Stand patient 3 times a day  - Ambulate patient 3 times a day  - Out of bed to chair 3 times a day   - Out of bed for meals 3 times a day  - Out of bed for toileting  - Record patient progress and toleration of activity level   Outcome: Progressing     Problem: DISCHARGE PLANNING  Goal: Discharge to home or other facility with appropriate resources  Description: INTERVENTIONS:  - Identify barriers to discharge w/patient and caregiver  - Arrange for needed discharge resources and transportation as appropriate  - Identify discharge learning needs (meds, wound care, etc.)  - Arrange for interpretive services to assist at discharge as needed  - Refer to  Case Management Department for coordinating discharge planning if the patient needs post-hospital services based on physician/advanced practitioner order or complex needs related to functional status, cognitive ability, or social support system  Outcome: Progressing     Problem: Knowledge Deficit  Goal: Patient/family/caregiver demonstrates understanding of disease process, treatment plan, medications, and discharge instructions  Description: Complete learning assessment and assess knowledge base.  Interventions:  - Provide teaching at level of understanding  - Provide teaching via preferred learning methods  Outcome: Progressing     Problem: Nutrition/Hydration-ADULT  Goal: Nutrient/Hydration intake appropriate for improving, restoring or maintaining nutritional needs  Description: Monitor and assess patient's nutrition/hydration status for malnutrition. Collaborate with interdisciplinary team and initiate plan and interventions as ordered.  Monitor patient's weight and dietary intake as ordered or per policy. Utilize nutrition screening tool and intervene as necessary. Determine patient's food preferences and provide high-protein, high-caloric foods as appropriate.     INTERVENTIONS:  - Monitor oral intake, urinary output, labs, and treatment plans  - Assess nutrition and hydration status and recommend course of action  - Evaluate amount of meals eaten  - Assist patient with eating if necessary   - Allow adequate time for meals  - Recommend/ encourage appropriate diets, oral nutritional supplements, and vitamin/mineral supplements  - Order, calculate, and assess calorie counts as needed  - Recommend, monitor, and adjust tube feedings and TPN/PPN based on assessed needs  - Assess need for intravenous fluids  - Provide specific nutrition/hydration education as appropriate  - Include patient/family/caregiver in decisions related to nutrition  Outcome: Progressing

## 2024-08-20 ENCOUNTER — TELEPHONE (OUTPATIENT)
Age: 74
End: 2024-08-20

## 2024-08-20 NOTE — TELEPHONE ENCOUNTER
[facility] calling to advise pt has passed away 8/16 as a courtesy call. Please advise . Thank you.

## 2024-08-22 ENCOUNTER — HOME CARE VISIT (OUTPATIENT)
Dept: HOME HOSPICE | Facility: HOSPICE | Age: 74
End: 2024-08-22
Payer: MEDICARE

## 2024-08-22 NOTE — CASE COMMUNICATION
Bhavna Al, Bereavement Final IDG 24 (1MR, 2LR) Due: 24   : 1950  SOC: 24  DOD: 24  Diagnosis: CHF, End Stage Kidney Disease  Primary: Daughter - Rhoda Huggins was a 74 year old woman at the U. Daughter Rhoda stated she is well supported by her daughters Devorah and Bhavna. Niece Janet visited and shared her mom, Devorah, (Bhavna's sister) has a very similar diagnosis and undergoes dialysis twice a week. 2 [facility] er siblings have  due to very similar health conditions. Janet reported her chon and good support system is seeing her through these circumstances. 28 year old Granddaughter, Devorah, visited. She works as support staff for a child with Autism. 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 Rhoda visited with her daughter, Bhavna. Rhoda voiced feeling guilty. She felt  she had not done enough for her mother and mentioned their complex relationship. Rhoda said she is a private person who jose alberto on her own and does not typically ask for outside support. Rhoda, Devorah, and Bhavna are open to bereavement follow-up. Janet declined follow-up.    TOD: A call was made to Rhoda to inform her of her mother's passing. Rhoda, Janet, Devorah and Bhavna visited. Janet wished she would have visited earlier in the day.  Family remained close by until Bhavna's body was removed from the facility. They were tearful but supportive of each other and thankful for hospice care.    Call Assignments:  MAURISIO Mcgregor to reassess daughter Rhoda Al at MR. (Due: 24)  Chaplain Velásquez to reassess granddaughters Devorah Salamanca and Bhavna Salamanca at LR. (Due: 24)  *Mailings for Devorah and Bhavna to be sent via email.*